# Patient Record
Sex: FEMALE | Race: WHITE | NOT HISPANIC OR LATINO | Employment: OTHER | URBAN - METROPOLITAN AREA
[De-identification: names, ages, dates, MRNs, and addresses within clinical notes are randomized per-mention and may not be internally consistent; named-entity substitution may affect disease eponyms.]

---

## 2017-01-03 DIAGNOSIS — R11.0 NAUSEA: ICD-10-CM

## 2017-01-04 RX ORDER — FOLIC ACID 1 MG/1
TABLET ORAL
Qty: 30 TABLET | Refills: 1 | Status: SHIPPED | OUTPATIENT
Start: 2017-01-04 | End: 2017-01-09 | Stop reason: SDUPTHER

## 2017-01-04 RX ORDER — METOCLOPRAMIDE 5 MG/1
TABLET ORAL
Qty: 60 TABLET | Refills: 1 | Status: SHIPPED | OUTPATIENT
Start: 2017-01-04 | End: 2017-01-09 | Stop reason: SDUPTHER

## 2017-01-09 DIAGNOSIS — R11.0 NAUSEA: ICD-10-CM

## 2017-01-09 RX ORDER — METOCLOPRAMIDE 5 MG/1
TABLET ORAL
Qty: 60 TABLET | Refills: 0 | Status: SHIPPED | OUTPATIENT
Start: 2017-01-09 | End: 2017-04-16 | Stop reason: SDUPTHER

## 2017-01-09 RX ORDER — FOLIC ACID 1 MG/1
TABLET ORAL
Qty: 30 TABLET | Refills: 0 | Status: SHIPPED | OUTPATIENT
Start: 2017-01-09 | End: 2017-03-29 | Stop reason: SDUPTHER

## 2017-01-10 ENCOUNTER — TELEPHONE (OUTPATIENT)
Dept: GASTROENTEROLOGY | Facility: CLINIC | Age: 57
End: 2017-01-10

## 2017-01-10 DIAGNOSIS — G40.919 INTRACTABLE EPILEPSY WITHOUT STATUS EPILEPTICUS (HCC): ICD-10-CM

## 2017-01-10 RX ORDER — LEVETIRACETAM 500 MG/1
TABLET ORAL
Qty: 60 TABLET | Refills: 0 | Status: SHIPPED | OUTPATIENT
Start: 2017-01-10 | End: 2017-02-11 | Stop reason: SDUPTHER

## 2017-01-10 NOTE — TELEPHONE ENCOUNTER
"Received fax [facility], stating pt needs prior auth on Xifaxan.  Submitted request via fax to [facility], but received letter stating \"unable to find pt in their records/or pt is ineligible\"  Faxed medicare and [employer] insurance cards back to [facility], but received same response later.    Called ptPORFIRIO to call our office.  Pt needs to call member services  "

## 2017-01-18 ENCOUNTER — TELEPHONE (OUTPATIENT)
Dept: GASTROENTEROLOGY | Facility: CLINIC | Age: 57
End: 2017-01-18

## 2017-01-18 NOTE — TELEPHONE ENCOUNTER
Pt had EGD 12/22/16 and next EGD due 6 months, June 2017.  She is also due for 6 month f/u of liver US in April 2017.  Pt would like to know when she needs an office appt?

## 2017-01-18 NOTE — TELEPHONE ENCOUNTER
Happy to see her at any time. In the past I've let Dr. Pitts monitor the labs while I tend to the procedures, but I haven't seen her in the office for quite some time.

## 2017-01-18 NOTE — TELEPHONE ENCOUNTER
Called [facility] to see if pt had new insurance, since [facility] states they are showing pt is ineligible.  Spoke with Laura, she faxed new insurance info on pt (Primary:  [employer] insurance, Secondary:  Medicare D [employer].    Called Luis insurance plan, no prior auth is needed on Xifaxan, the pharmacy just needs to call for override.    Called Laura back, she called plan, no further action needed, [employer]/Or insurance covered entire cost of Xifaxan.

## 2017-01-19 ENCOUNTER — APPOINTMENT (OUTPATIENT)
Dept: WOMENS IMAGING | Facility: HOSPITAL | Age: 57
End: 2017-01-19

## 2017-01-19 PROCEDURE — G0202 SCR MAMMO BI INCL CAD: HCPCS | Performed by: RADIOLOGY

## 2017-01-19 PROCEDURE — 77063 BREAST TOMOSYNTHESIS BI: CPT | Performed by: RADIOLOGY

## 2017-01-19 PROCEDURE — MDREVIEWSP: Performed by: RADIOLOGY

## 2017-01-24 ENCOUNTER — TELEPHONE (OUTPATIENT)
Dept: GASTROENTEROLOGY | Facility: CLINIC | Age: 57
End: 2017-01-24

## 2017-01-24 ENCOUNTER — OFFICE VISIT (OUTPATIENT)
Dept: INTERNAL MEDICINE | Facility: CLINIC | Age: 57
End: 2017-01-24

## 2017-01-24 VITALS
OXYGEN SATURATION: 97 % | TEMPERATURE: 98.3 F | SYSTOLIC BLOOD PRESSURE: 110 MMHG | BODY MASS INDEX: 29.03 KG/M2 | HEART RATE: 110 BPM | HEIGHT: 67 IN | DIASTOLIC BLOOD PRESSURE: 72 MMHG | WEIGHT: 185 LBS

## 2017-01-24 DIAGNOSIS — K76.82 HEPATIC ENCEPHALOPATHY (HCC): ICD-10-CM

## 2017-01-24 DIAGNOSIS — M25.561 CHRONIC PAIN OF BOTH KNEES: ICD-10-CM

## 2017-01-24 DIAGNOSIS — G89.29 CHRONIC PAIN OF BOTH KNEES: ICD-10-CM

## 2017-01-24 DIAGNOSIS — M25.562 CHRONIC PAIN OF BOTH KNEES: ICD-10-CM

## 2017-01-24 DIAGNOSIS — R11.2 NAUSEA AND VOMITING, INTRACTABILITY OF VOMITING NOT SPECIFIED, UNSPECIFIED VOMITING TYPE: Primary | ICD-10-CM

## 2017-01-24 PROCEDURE — 99213 OFFICE O/P EST LOW 20 MIN: CPT | Performed by: NURSE PRACTITIONER

## 2017-01-24 RX ORDER — DIPHENHYDRAMINE HCL 25 MG
25 CAPSULE ORAL EVERY 6 HOURS PRN
COMMUNITY
End: 2017-12-20 | Stop reason: HOSPADM

## 2017-01-24 NOTE — TELEPHONE ENCOUNTER
Pt started with nausea and vomiting on Saturday.  Saw PCP today, recommended she increase Reglan to TID and call our office for further management.  Pt states she does have gastroparesis.   No fever, not vomiting blood, no other symptoms.    Discussed with Dr Coleman, no other recommendations at this point.  LVM with pt.

## 2017-01-24 NOTE — PROGRESS NOTES
Subjective   Silvia Zabala is a 56 y.o. female.     HPI Comments: She has a history of liver disease. She recently had EGD on 12/22/16 by Dr Coleman which had esophageal varices with bands placed. Her blood sugars have been controlled.     Vomiting    This is a new problem. The current episode started yesterday. The problem occurs 2 to 4 times per day. The problem has been gradually improving. The emesis has an appearance of stomach contents. There has been no fever. Associated symptoms include abdominal pain (chronic ) and arthralgias (bilateral knees , chronic ). Pertinent negatives include no chest pain, chills, coughing, diarrhea or fever. She has tried increased fluids and diet change (peptobismol ) for the symptoms. The treatment provided mild relief.        The following portions of the patient's history were reviewed and updated as appropriate: allergies, current medications and problem list.    Review of Systems   Constitutional: Positive for appetite change. Negative for chills, fatigue and fever.   Respiratory: Negative for cough.    Cardiovascular: Negative for chest pain.   Gastrointestinal: Positive for abdominal distention, abdominal pain (chronic ), nausea and vomiting. Negative for blood in stool, constipation and diarrhea.   Genitourinary: Negative for dysuria and urgency.   Musculoskeletal: Positive for arthralgias (bilateral knees , chronic ). Negative for back pain.       Objective   Physical Exam   Constitutional: She is oriented to person, place, and time. She appears well-developed and well-nourished.   HENT:   Head: Normocephalic.   Nose: Nose normal.   Cardiovascular: Regular rhythm and normal heart sounds.  Exam reveals no S3 and no S4.    No murmur heard.  Pulmonary/Chest: Effort normal and breath sounds normal. She has no decreased breath sounds. She has no wheezes. She has no rhonchi. She has no rales.   Abdominal: Normal appearance. She exhibits distension and ascites. There is  generalized tenderness.   Musculoskeletal: She exhibits no edema.   Neurological: She is alert and oriented to person, place, and time. Gait normal.   Skin: Skin is warm and dry.   Psychiatric: She has a normal mood and affect.       Assessment/Plan   Silvia was seen today for vomiting.    Diagnoses and all orders for this visit:    Nausea and vomiting, intractability of vomiting not specified, unspecified vomiting type  Comments:  she will temporarirly increase reglan to 3 times daily; she will need to contact GI to discuss further management     Hepatic encephalopathy    Chronic pain of both knees  -     Ambulatory Referral to Orthopedic Surgery        Will have patient temporary increase Reglan to 3 times a day and inform GI and liver specialist regarding current symptoms.I discussed case with Dr Pitts. Patient request referral for chronic knee pain. I highly encourage she start PT (previously ordered).

## 2017-01-24 NOTE — MR AVS SNAPSHOT
Silvia Zabala   1/24/2017 8:15 AM   Office Visit    Dept Phone:  [phone]   Encounter #:  [id number]    Provider:  JOEL Maldonado   Department:  [facility] INTERNAL MEDICINE                Your Full Care Plan              Your Updated Medication List          This list is accurate as of: 1/24/17  9:18 AM.  Always use your most recent med list.                ALPRAZolam 0.5 MG tablet   Commonly known as:  XANAX   TAKE ONE TABLET BY MOUTH ONCE NIGHTLY AS NEEDED FOR ANXIETY       cyclobenzaprine 5 MG tablet   Commonly known as:  FLEXERIL   Take 1 tablet by mouth 2 (two) times a day as needed for muscle spasms.       cycloSPORINE 0.05 % ophthalmic emulsion   Commonly known as:  RESTASIS       diphenhydrAMINE 25 mg capsule   Commonly known as:  BENADRYL       docusate sodium 100 MG capsule   Commonly known as:  COLACE       DULoxetine 60 MG capsule   Commonly known as:  CYMBALTA   TAKE ONE CAPSULE BY MOUTH DAILY; **MUST CALL MD FOR APPOINTMENT       Etanercept 50 MG/ML solution auto-injector       ferrous sulfate 325 (65 FE) MG tablet   TAKE ONE TABLET BY MOUTH TWICE A DAY       folic acid 1 MG tablet   Commonly known as:  FOLVITE   TAKE ONE TABLET BY MOUTH DAILY       Insulin Glargine 100 UNIT/ML injection pen   Commonly known as:  LANTUS SOLOSTAR   INJECT 80 UNITS UNDER THE SKIN DAILY       Insulin Pen Needle 31G X 5 MM misc   To inject 5 -6 times per day       levETIRAcetam 500 MG tablet   Commonly known as:  KEPPRA   TAKE ONE TABLET BY MOUTH TWICE A DAY       LYRICA 75 MG capsule   Generic drug:  pregabalin   TAKE ONE CAPSULE BY MOUTH TWICE A DAY       MELATONIN PO       metFORMIN 1000 MG tablet   Commonly known as:  GLUCOPHAGE   Take 1 tablet by mouth 2 (Two) Times a Day With Meals.       metoclopramide 5 MG tablet   Commonly known as:  REGLAN   TAKE ONE TABLET BY MOUTH BEFORE MEALS AND AT BEDTIME; **MUST CALL MD FOR APPOINTMENT       MULTI-VITAMIN PO       NOVOLOG FLEXPEN 100 UNIT/ML solution pen-injector sc pen   Generic drug:  insulin aspart   INJECT 40 UNITS UNDER THE SKIN BEFORE EACH MEAL AND 25 units before SNACKS       pantoprazole 40 MG EC tablet   Commonly known as:  PROTONIX       pravastatin 40 MG tablet   Commonly known as:  PRAVACHOL       sucralfate 1 G tablet   Commonly known as:  CARAFATE       vitamin B-12 1000 MCG tablet   Commonly known as:  CYANOCOBALAMIN       XIFAXAN 550 MG tablet   Generic drug:  rifaximin   TAKE ONE TABLET BY MOUTH TWICE A DAY               We Performed the Following     Ambulatory Referral to Orthopedic Surgery       You Were Diagnosed With        Codes Comments    Nausea and vomiting, intractability of vomiting not specified, unspecified vomiting type    -  Primary ICD-10-CM: R11.2  ICD-9-CM: 787.01 she will temporarirly increase reglan to 3 times daily; she will need to contact GI to discuss further management     Hepatic encephalopathy     ICD-10-CM: K72.90  ICD-9-CM: 572.2     Chronic pain of both knees     ICD-10-CM: M25.561, M25.562, G89.29  ICD-9-CM: 719.46, 338.29       Instructions     None    Patient Instructions History      Upcoming Appointments     Visit Type Date Time Department    ACUTE           2017  8:15 AM [facility]    FOLLOW UP 2017 10:15 AM [facility]    [facility] 3/7/2017  9:00 AM [facility]    OFFICE VISIT 3/21/2017 11:00 AM [facility]    FOLLOW UP 1 UNIT 3/28/2017  1:40 PM [facility]    LAB 3/28/2017  1:00 PM [facility]      MyChart Signup     [facility] OpenEd allows you to send messages to your doctor, view your test results, renew your prescriptions, schedule appointments, and more. To sign up, go to [employer] and click on the Sign Up Now link in the New User? box. Enter your OpenEd Activation Code exactly as it appears below along with the last four digits of your Social Security Number and your Date of Birth () to  "complete the sign-up process. If you do not sign up before the expiration date, you must request a new code.    [facility] Activation Code: [email or web address]  Expires: 2/7/2017  9:17 AM    If you have questions, you can email [email or web address] or call [phone] to talk to our [facility] staff. Remember, [employer] is NOT to be used for urgent needs. For medical emergencies, dial 911.               Other Info from Your Visit           Your Appointments     Feb 02, 2017 10:15 AM EST   Follow Up with Blanca Pitts MD   [facility] INTERNAL MEDICINE (--)    [address]  [facility] [address] [phone]           Arrive 15 minutes prior to appointment.            Mar 07, 2017  9:00 AM EST   [facility] with [facility], [facility]   [facility] ENDOCRINOLOGY (--)    [address]  [facility] [address] [phone]            Mar 21, 2017 11:00 AM EDT   Office Visit with Merary Burnett MD   [facility] ENDOCRINOLOGY (--)    [address]  [facility] [address] [phone]           Arrive 15 minutes prior to appointment.            Mar 28, 2017  1:00 PM EDT   Lab with LAB CHAIR [facility] ONCOLOGY CBC LAB ([place])    [address]  [facility] [address] [phone]            Mar 28, 2017  1:40 PM EDT   FOLLOW UP with Sukhdeep Mcdowell MD   [facility] CBC GROUP: [facility] IN BLOOD DISORDERS AND CANCER (CBC [place])    [address]  [facility] [address] [phone]              Allergies     Albuterol  Anaphylaxis    Quinine Derivatives  Nausea And Vomiting    Tramadol        Reason for Visit     Vomiting           Vital Signs     Blood Pressure Pulse Temperature Height Weight Oxygen Saturation    110/72 (BP Location: Right arm) 110 98.3 °F (36.8 °C) (Oral) 66.5\" (168.9 cm) 185 lb (83.9 kg) 97%    Body Mass Index Smoking Status    "             29.41 kg/m2 Former Smoker          Problems and Diagnoses Noted     Liver encephalopathy    Nausea and vomiting    -  Primary    Chronic pain of both knees

## 2017-02-02 ENCOUNTER — OFFICE VISIT (OUTPATIENT)
Dept: INTERNAL MEDICINE | Facility: CLINIC | Age: 57
End: 2017-02-02

## 2017-02-02 VITALS
BODY MASS INDEX: 28.41 KG/M2 | RESPIRATION RATE: 14 BRPM | WEIGHT: 181 LBS | DIASTOLIC BLOOD PRESSURE: 68 MMHG | SYSTOLIC BLOOD PRESSURE: 120 MMHG | HEIGHT: 67 IN

## 2017-02-02 DIAGNOSIS — K31.84 GASTROPARESIS: ICD-10-CM

## 2017-02-02 DIAGNOSIS — F32.A ANXIETY AND DEPRESSION: Primary | ICD-10-CM

## 2017-02-02 DIAGNOSIS — E78.5 HYPERLIPIDEMIA, UNSPECIFIED HYPERLIPIDEMIA TYPE: ICD-10-CM

## 2017-02-02 DIAGNOSIS — F41.9 ANXIETY AND DEPRESSION: Primary | ICD-10-CM

## 2017-02-02 PROCEDURE — 99213 OFFICE O/P EST LOW 20 MIN: CPT | Performed by: INTERNAL MEDICINE

## 2017-02-02 RX ORDER — DULOXETIN HYDROCHLORIDE 30 MG/1
CAPSULE, DELAYED RELEASE ORAL
Qty: 90 CAPSULE | Refills: 5 | Status: SHIPPED | OUTPATIENT
Start: 2017-02-02 | End: 2017-11-01 | Stop reason: SDUPTHER

## 2017-02-02 RX ORDER — MELATONIN
1000 DAILY
COMMUNITY
End: 2018-05-23 | Stop reason: HOSPADM

## 2017-02-02 NOTE — PROGRESS NOTES
Chief Complaint   Patient presents with   • Diabetes     3 month follow up    • Hyperlipidemia        Subjective   Silvia Zabala is a 56 y.o. female she was seen recently here for nausea and vomiting.  Reglan was increased.    Diabetes   Pertinent negatives for diabetes include no chest pain, no fatigue, no polydipsia and no polyuria.   Hyperlipidemia   This is a chronic problem. The problem is controlled. Recent lipid tests were reviewed and are variable (Cholesterol levels were good, triglycerides high). Exacerbating diseases include diabetes, liver disease and obesity. Pertinent negatives include no chest pain, leg pain, myalgias or shortness of breath. Current antihyperlipidemic treatment includes statins. The current treatment provides significant improvement of lipids. There are no compliance problems.  Risk factors for coronary artery disease include diabetes mellitus, dyslipidemia and obesity.   : Her blood sugars have been better.  Fasting yesterday 105.  She is seeing endocrinologist now.     Higher dose of reglan seems to be helping nausea.   Saw Dr. Munoz recently - had a shoulder xray.  Interstitial lung disease suspected on small portion of lung seen on shoulder xray. She is scheduled to have lung ct.  She is going to see orthopedist soon and will discuss knee pain there. She is seeing PT for shoulder pain - possible rotator cuff probem suspected.  She has not done PT for her knees yet.     She is wondering about taking an antidepressant and seeing a therapist. She had a hard time over the holidays since her mother passed away last year.  Her daughter has bi-polar disorder, her father is raising her daughter's two sons.  They both have ADHD. She is still taking duloxetine but had to stop abilify due to potential interaction with Reglan.    The following portions of the patient's history were reviewed and updated as appropriate: allergies, current medications, past social history, problem list,  "past surgical history    Review of Systems   Constitutional: Negative for fatigue.   Eyes: Negative for visual disturbance.   Respiratory: Negative for cough and shortness of breath.    Cardiovascular: Negative for chest pain, palpitations and leg swelling.   Gastrointestinal: Negative for abdominal pain.   Endocrine: Negative for polydipsia and polyuria.   Musculoskeletal: Negative for myalgias.       Visit Vitals   • /68 (BP Location: Left arm, Patient Position: Sitting, Cuff Size: Adult)   • Resp 14   • Ht 66.5\" (168.9 cm)   • Wt 181 lb (82.1 kg)   • BMI 28.78 kg/m2        Objective   Physical Exam   Constitutional: She is oriented to person, place, and time. She appears well-developed and well-nourished. No distress.   Neck: Normal carotid pulses present. Carotid bruit is not present.   Cardiovascular: Normal rate, regular rhythm, S1 normal, S2 normal and intact distal pulses.  Exam reveals no gallop and no friction rub.    No murmur heard.  Pulses:       Carotid pulses are 2+ on the right side, and 2+ on the left side.  Pulmonary/Chest: Effort normal and breath sounds normal. No respiratory distress. She has no wheezes. She has no rales. Chest wall is not dull to percussion.   Musculoskeletal: She exhibits no edema.   Neurological: She is alert and oriented to person, place, and time.   Skin: Skin is warm and dry.   Nursing note and vitals reviewed.      Assessment/Plan   Problem List Items Addressed This Visit        Cardiovascular and Mediastinum    Hyperlipidemia       Other    Anxiety and depression - Primary    Relevant Medications    DULoxetine (CYMBALTA) 30 MG capsule    Other Relevant Orders    Ambulatory Referral to Psychiatry (Completed)      Other Visit Diagnoses     Gastroparesis           diabetes: She will continue to see her endocrinologist for this.  Nausea and vomiting, history of gastroparesis: She will continue the higher dose of Reglan.  For anxiety and depression, Cymbalta was " increased from 60 mg to 90 mg daily.  Recommended [facility] and consultation with psychiatry.  She will continue pravastatin for hyperlipidemia.  She recently had a cholesterol panel done and will not repeat today.

## 2017-02-03 NOTE — TELEPHONE ENCOUNTER
----- Message from Brittney Arellano sent at 2/3/2017 12:00 PM EST -----  Contact: patient  Patient says that she needs a refill of test strips  Test strips one touch ultra 2, tests 3-4 times per day, 30 day supply  Send to [facility]  [phone] (Phone)  [phone] (Fax)      Test strips sent to pharmacy

## 2017-02-11 DIAGNOSIS — G40.919 INTRACTABLE EPILEPSY WITHOUT STATUS EPILEPTICUS (HCC): ICD-10-CM

## 2017-02-13 ENCOUNTER — TELEPHONE (OUTPATIENT)
Dept: INTERNAL MEDICINE | Facility: CLINIC | Age: 57
End: 2017-02-13

## 2017-02-13 RX ORDER — PRAVASTATIN SODIUM 40 MG
40 TABLET ORAL DAILY
Qty: 30 TABLET | Refills: 5 | Status: SHIPPED | OUTPATIENT
Start: 2017-02-13 | End: 2017-03-30 | Stop reason: SDUPTHER

## 2017-02-13 RX ORDER — LEVETIRACETAM 500 MG/1
TABLET ORAL
Qty: 60 TABLET | Refills: 2 | Status: SHIPPED | OUTPATIENT
Start: 2017-02-13 | End: 2017-05-11 | Stop reason: SDUPTHER

## 2017-02-14 ENCOUNTER — OFFICE VISIT (OUTPATIENT)
Dept: GASTROENTEROLOGY | Facility: CLINIC | Age: 57
End: 2017-02-14

## 2017-02-14 VITALS
SYSTOLIC BLOOD PRESSURE: 145 MMHG | HEART RATE: 118 BPM | WEIGHT: 181 LBS | BODY MASS INDEX: 28.41 KG/M2 | HEIGHT: 67 IN | DIASTOLIC BLOOD PRESSURE: 92 MMHG | TEMPERATURE: 98.9 F

## 2017-02-14 DIAGNOSIS — R10.84 GENERALIZED ABDOMINAL PAIN: ICD-10-CM

## 2017-02-14 DIAGNOSIS — K74.60 CIRRHOSIS OF LIVER WITHOUT ASCITES, UNSPECIFIED HEPATIC CIRRHOSIS TYPE (HCC): Primary | ICD-10-CM

## 2017-02-14 DIAGNOSIS — K76.82 HEPATIC ENCEPHALOPATHY (HCC): ICD-10-CM

## 2017-02-14 PROCEDURE — 99214 OFFICE O/P EST MOD 30 MIN: CPT | Performed by: INTERNAL MEDICINE

## 2017-02-14 NOTE — PROGRESS NOTES
Subjective   Silvia Zabala is a 56 y.o. female is here today for follow-up.  Chief Complaint   Patient presents with   • Cirrhosis   • Nausea   • Vomiting     History of Present Illness  Over the past 2 weeks patient has not felt well.  It is difficult for her to express exactly why, other than she has felt nauseated and that she has a feeling of increasing abdominal girth and some abdominal discomfort.  She has had some nausea but no hematin temp assess or melena.  She has had no fever.  Her appetite is poor.  She is under great deal of stress, having significant family issues down in [place].  She is extremely anxious.  The following portions of the patient's history were reviewed and updated as appropriate: allergies, current medications, past family history, past medical history, past social history, past surgical history and problem list.    Review of Systems   Constitutional: Positive for fatigue.   Respiratory: Negative for chest tightness.    Cardiovascular: Negative for chest pain.   Gastrointestinal: Positive for abdominal distention and nausea. Negative for anal bleeding and blood in stool.   Psychiatric/Behavioral: The patient is nervous/anxious.        Objective   Physical Exam   Constitutional: Vital signs are normal. She appears well-developed and well-nourished.   Cardiovascular: S1 normal and S2 normal.  Exam reveals no gallop and no S3.    Pulmonary/Chest: Effort normal and breath sounds normal. No respiratory distress.   Abdominal: Normal appearance. She exhibits no distension and no ascites. There is no tenderness.         Assessment/Plan   Problems Addressed this Visit        Digestive    Cirrhosis of liver - Primary    Relevant Orders    US Abdomen Complete    CBC & Differential    Comprehensive Metabolic Panel    Protime-INR       Nervous and Auditory    Hepatic encephalopathy    Relevant Orders    US Abdomen Complete    CBC & Differential    Comprehensive Metabolic Panel    Protime-INR     "Generalized abdominal pain        We will get the above labs and see where that leads us.  It possibly at this point hard to know whether there is something \"in the works\" whether she is under some pressure and anxiety that that is causing her symptoms.           "

## 2017-02-15 ENCOUNTER — ANESTHESIA (OUTPATIENT)
Dept: GASTROENTEROLOGY | Facility: HOSPITAL | Age: 57
End: 2017-02-15

## 2017-02-15 ENCOUNTER — TELEPHONE (OUTPATIENT)
Dept: GASTROENTEROLOGY | Facility: CLINIC | Age: 57
End: 2017-02-15

## 2017-02-15 ENCOUNTER — ANESTHESIA EVENT (OUTPATIENT)
Dept: GASTROENTEROLOGY | Facility: HOSPITAL | Age: 57
End: 2017-02-15

## 2017-02-15 ENCOUNTER — HOSPITAL ENCOUNTER (INPATIENT)
Facility: HOSPITAL | Age: 57
LOS: 4 days | Discharge: HOME | End: 2017-02-19
Attending: EMERGENCY MEDICINE | Admitting: INTERNAL MEDICINE

## 2017-02-15 DIAGNOSIS — I85.11 SECONDARY ESOPHAGEAL VARICES WITH BLEEDING (HCC): ICD-10-CM

## 2017-02-15 DIAGNOSIS — K92.2 UGI BLEED: Primary | ICD-10-CM

## 2017-02-15 LAB
ABO GROUP BLD: NORMAL
ALBUMIN SERPL-MCNC: 3.6 G/DL (ref 3.5–5.2)
ALBUMIN/GLOB SERPL: 1.2 G/DL
ALP SERPL-CCNC: 83 U/L (ref 39–117)
ALT SERPL W P-5'-P-CCNC: 36 U/L (ref 1–33)
ANION GAP SERPL CALCULATED.3IONS-SCNC: 17.4 MMOL/L
AST SERPL-CCNC: 29 U/L (ref 1–32)
BASOPHILS # BLD AUTO: 0.04 10*3/MM3 (ref 0–0.2)
BASOPHILS NFR BLD AUTO: 0.3 % (ref 0–1.5)
BILIRUB SERPL-MCNC: 1 MG/DL (ref 0.1–1.2)
BLD GP AB SCN SERPL QL: NEGATIVE
BUN BLD-MCNC: 36 MG/DL (ref 6–20)
BUN/CREAT SERPL: 54.5 (ref 7–25)
CALCIUM SPEC-SCNC: 9.1 MG/DL (ref 8.6–10.5)
CHLORIDE SERPL-SCNC: 96 MMOL/L (ref 98–107)
CO2 SERPL-SCNC: 21.6 MMOL/L (ref 22–29)
CREAT BLD-MCNC: 0.66 MG/DL (ref 0.57–1)
DEPRECATED RDW RBC AUTO: 47.5 FL (ref 37–54)
EOSINOPHIL # BLD AUTO: 0.11 10*3/MM3 (ref 0–0.7)
EOSINOPHIL NFR BLD AUTO: 0.8 % (ref 0.3–6.2)
ERYTHROCYTE [DISTWIDTH] IN BLOOD BY AUTOMATED COUNT: 15.1 % (ref 11.7–13)
GFR SERPL CREATININE-BSD FRML MDRD: 93 ML/MIN/1.73
GLOBULIN UR ELPH-MCNC: 3 GM/DL
GLUCOSE BLD-MCNC: 307 MG/DL (ref 65–99)
GLUCOSE BLDC GLUCOMTR-MCNC: 136 MG/DL (ref 70–130)
GLUCOSE BLDC GLUCOMTR-MCNC: 275 MG/DL (ref 70–130)
GLUCOSE BLDC GLUCOMTR-MCNC: 292 MG/DL (ref 70–130)
GLUCOSE BLDC GLUCOMTR-MCNC: 304 MG/DL (ref 70–130)
GLUCOSE BLDC GLUCOMTR-MCNC: 312 MG/DL (ref 70–130)
GLUCOSE BLDC GLUCOMTR-MCNC: 329 MG/DL (ref 70–130)
HCT VFR BLD AUTO: 31.7 % (ref 35.6–45.5)
HCT VFR BLD AUTO: 32.8 % (ref 35.6–45.5)
HCT VFR BLD AUTO: 33.7 % (ref 35.6–45.5)
HGB BLD-MCNC: 10.7 G/DL (ref 11.9–15.5)
HGB BLD-MCNC: 10.7 G/DL (ref 11.9–15.5)
HGB BLD-MCNC: 11.3 G/DL (ref 11.9–15.5)
IMM GRANULOCYTES # BLD: 0.08 10*3/MM3 (ref 0–0.03)
IMM GRANULOCYTES NFR BLD: 0.6 % (ref 0–0.5)
INR PPP: 1.21 (ref 0.9–1.1)
LYMPHOCYTES # BLD AUTO: 2.34 10*3/MM3 (ref 0.9–4.8)
LYMPHOCYTES NFR BLD AUTO: 17 % (ref 19.6–45.3)
MCH RBC QN AUTO: 28.2 PG (ref 26.9–32)
MCHC RBC AUTO-ENTMCNC: 32.6 G/DL (ref 32.4–36.3)
MCV RBC AUTO: 86.5 FL (ref 80.5–98.2)
MONOCYTES # BLD AUTO: 1.04 10*3/MM3 (ref 0.2–1.2)
MONOCYTES NFR BLD AUTO: 7.5 % (ref 5–12)
NEUTROPHILS # BLD AUTO: 10.18 10*3/MM3 (ref 1.9–8.1)
NEUTROPHILS NFR BLD AUTO: 73.8 % (ref 42.7–76)
PLATELET # BLD AUTO: 199 10*3/MM3 (ref 140–500)
PMV BLD AUTO: 10.8 FL (ref 6–12)
POTASSIUM BLD-SCNC: 4.4 MMOL/L (ref 3.5–5.2)
POTASSIUM BLD-SCNC: 5.3 MMOL/L (ref 3.5–5.2)
PROT SERPL-MCNC: 6.6 G/DL (ref 6–8.5)
PROTHROMBIN TIME: 14.9 SECONDS (ref 11.7–14.2)
RBC # BLD AUTO: 3.79 10*6/MM3 (ref 3.9–5.2)
RH BLD: POSITIVE
SODIUM BLD-SCNC: 135 MMOL/L (ref 136–145)
WBC NRBC COR # BLD: 13.79 10*3/MM3 (ref 4.5–10.7)

## 2017-02-15 PROCEDURE — 25010000003 CEFTRIAXONE PER 250 MG: Performed by: INTERNAL MEDICINE

## 2017-02-15 PROCEDURE — 63710000001 INSULIN ASPART PER 5 UNITS: Performed by: INTERNAL MEDICINE

## 2017-02-15 PROCEDURE — 80053 COMPREHEN METABOLIC PANEL: CPT | Performed by: EMERGENCY MEDICINE

## 2017-02-15 PROCEDURE — 86901 BLOOD TYPING SEROLOGIC RH(D): CPT

## 2017-02-15 PROCEDURE — 30233N1 TRANSFUSION OF NONAUTOLOGOUS RED BLOOD CELLS INTO PERIPHERAL VEIN, PERCUTANEOUS APPROACH: ICD-10-PCS | Performed by: INTERNAL MEDICINE

## 2017-02-15 PROCEDURE — 85025 COMPLETE CBC W/AUTO DIFF WBC: CPT | Performed by: EMERGENCY MEDICINE

## 2017-02-15 PROCEDURE — 25010000002 PROPOFOL 10 MG/ML EMULSION: Performed by: ANESTHESIOLOGY

## 2017-02-15 PROCEDURE — 86850 RBC ANTIBODY SCREEN: CPT

## 2017-02-15 PROCEDURE — 25010000002 OCTREOTIDE PER 25 MCG: Performed by: EMERGENCY MEDICINE

## 2017-02-15 PROCEDURE — P9016 RBC LEUKOCYTES REDUCED: HCPCS

## 2017-02-15 PROCEDURE — 25010000002 ONDANSETRON PER 1 MG: Performed by: EMERGENCY MEDICINE

## 2017-02-15 PROCEDURE — 86900 BLOOD TYPING SEROLOGIC ABO: CPT

## 2017-02-15 PROCEDURE — 36430 TRANSFUSION BLD/BLD COMPNT: CPT

## 2017-02-15 PROCEDURE — 86920 COMPATIBILITY TEST SPIN: CPT

## 2017-02-15 PROCEDURE — 25010000002 ONDANSETRON PER 1 MG

## 2017-02-15 PROCEDURE — 85018 HEMOGLOBIN: CPT | Performed by: INTERNAL MEDICINE

## 2017-02-15 PROCEDURE — 85610 PROTHROMBIN TIME: CPT | Performed by: EMERGENCY MEDICINE

## 2017-02-15 PROCEDURE — 43244 EGD VARICES LIGATION: CPT | Performed by: INTERNAL MEDICINE

## 2017-02-15 PROCEDURE — 94799 UNLISTED PULMONARY SVC/PX: CPT

## 2017-02-15 PROCEDURE — 82962 GLUCOSE BLOOD TEST: CPT

## 2017-02-15 PROCEDURE — 99284 EMERGENCY DEPT VISIT MOD MDM: CPT

## 2017-02-15 PROCEDURE — 25010000002 OCTREOTIDE PER 25 MCG: Performed by: INTERNAL MEDICINE

## 2017-02-15 PROCEDURE — 06L34CZ OCCLUSION OF ESOPHAGEAL VEIN WITH EXTRALUMINAL DEVICE, PERCUTANEOUS ENDOSCOPIC APPROACH: ICD-10-PCS | Performed by: INTERNAL MEDICINE

## 2017-02-15 PROCEDURE — 25010000002 MORPHINE SULFATE (PF) 2 MG/ML SOLUTION: Performed by: INTERNAL MEDICINE

## 2017-02-15 PROCEDURE — 84132 ASSAY OF SERUM POTASSIUM: CPT | Performed by: INTERNAL MEDICINE

## 2017-02-15 PROCEDURE — 85014 HEMATOCRIT: CPT | Performed by: INTERNAL MEDICINE

## 2017-02-15 RX ORDER — ONDANSETRON 2 MG/ML
4 INJECTION INTRAMUSCULAR; INTRAVENOUS ONCE
Status: COMPLETED | OUTPATIENT
Start: 2017-02-15 | End: 2017-02-15

## 2017-02-15 RX ORDER — ONDANSETRON 2 MG/ML
INJECTION INTRAMUSCULAR; INTRAVENOUS
Status: COMPLETED
Start: 2017-02-15 | End: 2017-02-15

## 2017-02-15 RX ORDER — OCTREOTIDE ACETATE 50 UG/ML
50 INJECTION, SOLUTION INTRAVENOUS; SUBCUTANEOUS ONCE
Status: COMPLETED | OUTPATIENT
Start: 2017-02-15 | End: 2017-02-15

## 2017-02-15 RX ORDER — DEXTROSE MONOHYDRATE 25 G/50ML
25 INJECTION, SOLUTION INTRAVENOUS
Status: DISCONTINUED | OUTPATIENT
Start: 2017-02-15 | End: 2017-02-19 | Stop reason: HOSPADM

## 2017-02-15 RX ORDER — SODIUM CHLORIDE, SODIUM LACTATE, POTASSIUM CHLORIDE, CALCIUM CHLORIDE 600; 310; 30; 20 MG/100ML; MG/100ML; MG/100ML; MG/100ML
INJECTION, SOLUTION INTRAVENOUS CONTINUOUS PRN
Status: DISCONTINUED | OUTPATIENT
Start: 2017-02-15 | End: 2017-02-15 | Stop reason: SURG

## 2017-02-15 RX ORDER — SODIUM CHLORIDE 0.9 % (FLUSH) 0.9 %
10 SYRINGE (ML) INJECTION AS NEEDED
Status: DISCONTINUED | OUTPATIENT
Start: 2017-02-15 | End: 2017-02-19 | Stop reason: HOSPADM

## 2017-02-15 RX ORDER — NICOTINE POLACRILEX 4 MG
15 LOZENGE BUCCAL
Status: DISCONTINUED | OUTPATIENT
Start: 2017-02-15 | End: 2017-02-19 | Stop reason: HOSPADM

## 2017-02-15 RX ORDER — PANTOPRAZOLE SODIUM 40 MG/10ML
80 INJECTION, POWDER, LYOPHILIZED, FOR SOLUTION INTRAVENOUS ONCE
Status: COMPLETED | OUTPATIENT
Start: 2017-02-15 | End: 2017-02-15

## 2017-02-15 RX ORDER — SODIUM CHLORIDE 0.9 % (FLUSH) 0.9 %
1-10 SYRINGE (ML) INJECTION AS NEEDED
Status: DISCONTINUED | OUTPATIENT
Start: 2017-02-15 | End: 2017-02-16

## 2017-02-15 RX ORDER — SODIUM CHLORIDE 9 MG/ML
125 INJECTION, SOLUTION INTRAVENOUS CONTINUOUS
Status: DISCONTINUED | OUTPATIENT
Start: 2017-02-15 | End: 2017-02-18

## 2017-02-15 RX ORDER — MORPHINE SULFATE 2 MG/ML
2 INJECTION, SOLUTION INTRAMUSCULAR; INTRAVENOUS EVERY 4 HOURS PRN
Status: DISCONTINUED | OUTPATIENT
Start: 2017-02-15 | End: 2017-02-19 | Stop reason: HOSPADM

## 2017-02-15 RX ORDER — LIDOCAINE HYDROCHLORIDE 20 MG/ML
INJECTION, SOLUTION INFILTRATION; PERINEURAL AS NEEDED
Status: DISCONTINUED | OUTPATIENT
Start: 2017-02-15 | End: 2017-02-15 | Stop reason: SURG

## 2017-02-15 RX ORDER — CEFTRIAXONE SODIUM 2 G/50ML
2 INJECTION, SOLUTION INTRAVENOUS EVERY 24 HOURS
Status: DISCONTINUED | OUTPATIENT
Start: 2017-02-15 | End: 2017-02-16

## 2017-02-15 RX ORDER — PROPOFOL 10 MG/ML
VIAL (ML) INTRAVENOUS AS NEEDED
Status: DISCONTINUED | OUTPATIENT
Start: 2017-02-15 | End: 2017-02-15 | Stop reason: SURG

## 2017-02-15 RX ORDER — SODIUM CHLORIDE, SODIUM LACTATE, POTASSIUM CHLORIDE, CALCIUM CHLORIDE 600; 310; 30; 20 MG/100ML; MG/100ML; MG/100ML; MG/100ML
30 INJECTION, SOLUTION INTRAVENOUS CONTINUOUS
Status: DISCONTINUED | OUTPATIENT
Start: 2017-02-15 | End: 2017-02-15

## 2017-02-15 RX ADMIN — ONDANSETRON 4 MG: 2 INJECTION INTRAMUSCULAR; INTRAVENOUS at 05:14

## 2017-02-15 RX ADMIN — PROPOFOL 150 MG: 10 INJECTION, EMULSION INTRAVENOUS at 14:03

## 2017-02-15 RX ADMIN — SODIUM CHLORIDE 125 ML/HR: 9 INJECTION, SOLUTION INTRAVENOUS at 13:33

## 2017-02-15 RX ADMIN — PANTOPRAZOLE SODIUM 8 MG/HR: 40 INJECTION, POWDER, FOR SOLUTION INTRAVENOUS at 15:20

## 2017-02-15 RX ADMIN — ONDANSETRON 4 MG: 2 INJECTION INTRAMUSCULAR; INTRAVENOUS at 03:32

## 2017-02-15 RX ADMIN — PANTOPRAZOLE SODIUM 8 MG/HR: 40 INJECTION, POWDER, FOR SOLUTION INTRAVENOUS at 20:45

## 2017-02-15 RX ADMIN — OCTREOTIDE ACETATE 25 MCG/HR: 500 INJECTION, SOLUTION INTRAVENOUS; SUBCUTANEOUS at 09:07

## 2017-02-15 RX ADMIN — MORPHINE SULFATE 2 MG: 2 INJECTION, SOLUTION INTRAMUSCULAR; INTRAVENOUS at 19:50

## 2017-02-15 RX ADMIN — LIDOCAINE HYDROCHLORIDE 75 MG: 20 INJECTION, SOLUTION INFILTRATION; PERINEURAL at 14:03

## 2017-02-15 RX ADMIN — INSULIN ASPART 14 UNITS: 100 INJECTION, SOLUTION INTRAVENOUS; SUBCUTANEOUS at 13:23

## 2017-02-15 RX ADMIN — OCTREOTIDE ACETATE 50 MCG: 50 INJECTION, SOLUTION INTRAVENOUS; SUBCUTANEOUS at 05:06

## 2017-02-15 RX ADMIN — SODIUM CHLORIDE 1000 ML: 9 INJECTION, SOLUTION INTRAVENOUS at 03:00

## 2017-02-15 RX ADMIN — SODIUM CHLORIDE 125 ML/HR: 9 INJECTION, SOLUTION INTRAVENOUS at 05:16

## 2017-02-15 RX ADMIN — PANTOPRAZOLE SODIUM 8 MG/HR: 40 INJECTION, POWDER, FOR SOLUTION INTRAVENOUS at 11:45

## 2017-02-15 RX ADMIN — SODIUM CHLORIDE 2517 ML: 9 INJECTION, SOLUTION INTRAVENOUS at 03:47

## 2017-02-15 RX ADMIN — CEFTRIAXONE SODIUM 2 G: 2 INJECTION, SOLUTION INTRAVENOUS at 15:18

## 2017-02-15 RX ADMIN — PANTOPRAZOLE SODIUM 80 MG: 40 INJECTION, POWDER, FOR SOLUTION INTRAVENOUS at 03:30

## 2017-02-15 RX ADMIN — SODIUM CHLORIDE, POTASSIUM CHLORIDE, SODIUM LACTATE AND CALCIUM CHLORIDE: 600; 310; 30; 20 INJECTION, SOLUTION INTRAVENOUS at 14:00

## 2017-02-15 RX ADMIN — OCTREOTIDE ACETATE 25 MCG/HR: 500 INJECTION, SOLUTION INTRAVENOUS; SUBCUTANEOUS at 20:45

## 2017-02-15 RX ADMIN — INSULIN ASPART 15 UNITS: 100 INJECTION, SOLUTION INTRAVENOUS; SUBCUTANEOUS at 09:03

## 2017-02-15 RX ADMIN — PROPOFOL 50 MG: 10 INJECTION, EMULSION INTRAVENOUS at 14:10

## 2017-02-15 RX ADMIN — PROPOFOL 50 MG: 10 INJECTION, EMULSION INTRAVENOUS at 14:05

## 2017-02-15 NOTE — ED NOTES
Pt had large dark red vomiting episode on the floor. Order obtained from Dr. Mccormick for zofran 4 mg iv.     Marifer Hudson RN  02/15/17 0125

## 2017-02-15 NOTE — ANESTHESIA POSTPROCEDURE EVALUATION
Patient: Silvia Zabala    Procedure Summary     Date Anesthesia Start Anesthesia Stop Room / Location    02/15/17 1400 1426  [facility] /  [facility]       Procedure Diagnosis Surgeon Provider    ESOPHAGOGASTRODUODENOSCOPY AT BEDSIDE with esophageal varices banding (3) (N/A Esophagus) No diagnosis on file. MD Maria Gudaalupe Mon MD          Anesthesia Type: MAC  Last vitals  /69 (02/15/17 1419)    Temp      Pulse 112 (02/15/17 1419)   Resp 21 (02/15/17 1419)    SpO2 98 % (02/15/17 1419)      Post Anesthesia Care and Evaluation    Patient location during evaluation: ICU

## 2017-02-15 NOTE — PROGRESS NOTES
Brief procedure note:    Procedure: EGD    Pre-op diagnosis: GI bleed    Post-op diagnosis: Esophageal varices, banded. Duodenitis.     Recommendations: Octreotide/Protonix.     Please refer to the Provation-generated note for photos and further details.     Rich Coleman MD

## 2017-02-15 NOTE — H&P
[facility]  Phone: [phone]  Andrea Hall MD      Subjective   LOS: 0 days     56-year-old female with acute upper GI bleeding.  She has a history of an DUKES and esophageal varices.  She had esophageal varices banded in December 2016.  She recently also had an esophageal ulcer that was cauterized.  She was evaluated by the liver transplant team and is considered to be not a transplant candidate as of yet.  Patient states that she always has some abdominal pain.  She has been somewhat nauseous in about 3 weeks ago had an episode of emesis.  However this was not bloody.  Today she had the bright red blood emesis.  On the ER she had very large bloody emesis.  She has been dizzy and lightheaded.  However hemoglobin on entry was okay.    Patient has a significant history of rheumatoid arthritis and lupus.  She is on biologic agents for same.  More recently she is suspected of having interstitial lung disease related to her rheumatological issues.  She does not use oxygen at home and does not have any exercise limitation due to shortness of breath as of yet.  She does have diabetes mellitus.  She denies any heart disease or prior history of cancer.    Patient reports daytime sleepiness and snoring at night.  She has never been evaluated for sleep apnea.     I have reviewed and edited the Past Medical History, Past Surgical History, Home Medications, Social History and Family History as of 7:15 AM on 02/15/17.    Prescriptions Prior to Admission   Medication Sig Dispense Refill Last Dose   • ALPRAZolam (XANAX) 0.5 MG tablet TAKE ONE TABLET BY MOUTH ONCE NIGHTLY AS NEEDED FOR ANXIETY 60 tablet 2 Taking   • cholecalciferol (VITAMIN D3) 1000 UNITS tablet Take 1,000 Units by mouth Daily.   Taking   • cyclobenzaprine (FLEXERIL) 5 MG tablet Take 1 tablet by mouth 2 (two) times a day as needed for muscle spasms. 30 tablet 2 Not Taking   • cycloSPORINE (RESTASIS) 0.05 % ophthalmic emulsion 1 drop 2 (two) times a  day.   Not Taking   • diphenhydrAMINE (BENADRYL) 25 mg capsule Take 25 mg by mouth Every 6 (Six) Hours As Needed.   Taking   • docusate sodium (COLACE) 100 MG capsule Take 100 mg by mouth as needed for constipation.   Taking   • DULoxetine (CYMBALTA) 30 MG capsule Take 3 capsules daily 90 capsule 5 Taking   • Etanercept 50 MG/ML solution auto-injector Inject under the skin.   Taking   • ferrous sulfate 325 (65 FE) MG tablet TAKE ONE TABLET BY MOUTH TWICE A DAY 60 tablet 6 Taking   • folic acid (FOLVITE) 1 MG tablet TAKE ONE TABLET BY MOUTH DAILY 30 tablet 0 Taking   • glucose blood (ONE TOUCH ULTRA TEST) test strip Test blood sugar 4 times daily 200 each 5 Taking   • Insulin Glargine (LANTUS SOLOSTAR) 100 UNIT/ML injection pen INJECT 80 UNITS UNDER THE SKIN DAILY 3 pen 3 Taking   • Insulin Pen Needle 31G X 5 MM misc To inject 5 -6 times per day 100 each 11 Taking   • levETIRAcetam (KEPPRA) 500 MG tablet TAKE ONE TABLET BY MOUTH TWICE A DAY 60 tablet 2 Taking   • LYRICA 75 MG capsule TAKE ONE CAPSULE BY MOUTH TWICE A DAY 60 capsule 1 Taking   • MELATONIN PO Take 20 mg by mouth Daily.   Taking   • metFORMIN (GLUCOPHAGE) 1000 MG tablet Take 1 tablet by mouth 2 (Two) Times a Day With Meals. (Patient taking differently: Take 1,000 mg by mouth 3 (Three) Times a Day.) 60 tablet 11 Taking   • metoclopramide (REGLAN) 5 MG tablet TAKE ONE TABLET BY MOUTH BEFORE MEALS AND AT BEDTIME; **MUST CALL MD FOR APPOINTMENT 60 tablet 0 Taking   • Multiple Vitamin (MULTI-VITAMIN PO) Take  by mouth.   Taking   • NOVOLOG FLEXPEN 100 UNIT/ML solution pen-injector sc pen INJECT 40 UNITS UNDER THE SKIN BEFORE EACH MEAL AND 25 units before SNACKS 3 pen 5 Taking   • pantoprazole (PROTONIX) 40 MG EC tablet Take 40 mg by mouth daily.   Taking   • POTASSIUM PO Take  by mouth.   Taking   • pravastatin (PRAVACHOL) 40 MG tablet Take 1 tablet by mouth Daily. 30 tablet 5 Taking   • sucralfate (CARAFATE) 1 G tablet Take 1 tablet by mouth 2 (two) times a  day.   Taking   • vitamin B-12 (CYANOCOBALAMIN) 1000 MCG tablet Take 1,000 mcg by mouth daily.   Taking   • XIFAXAN 550 MG tablet TAKE ONE TABLET BY MOUTH TWICE A DAY 60 tablet 5 Taking       Review of Systems   Constitutional: Negative for appetite change, chills, fatigue and fever.   HENT: Negative for congestion, mouth sores, postnasal drip, rhinorrhea, trouble swallowing and voice change.    Cardiovascular: Negative for chest pain, palpitations and leg swelling.   Gastrointestinal: Positive for abdominal pain and vomiting. Negative for abdominal distention, constipation, diarrhea and nausea.   Endocrine: Negative for cold intolerance and heat intolerance.   Genitourinary: Negative for difficulty urinating, dysuria, frequency and urgency.   Musculoskeletal: Negative for arthralgias and back pain.   Skin: Negative for pallor and rash.   Neurological: Positive for dizziness and light-headedness. Negative for weakness and headaches.   Psychiatric/Behavioral: Negative for agitation and confusion. The patient is not nervous/anxious.        Vital Signs past 24hrs  BP range: BP: (119-145)/(73-94) 125/85  Pulse range: Heart Rate:  [118-140] 118  Resp rate range: Resp:  [16-20] 20  Temp range: Temp (24hrs), Av °F (37.2 °C), Min:97.9 °F (36.6 °C), Max:99.5 °F (37.5 °C)    Oxygen range: SpO2:  [92 %-100 %] 94 %;  ;   O2 Device: room air  179 lb 10.8 oz (81.5 kg); Body mass index is 28.14 kg/(m^2).     Adult female lying in bed.  No acute distress.  She looks somewhat pale.  Pupils equal and reactive to light.  Oropharynx moist.  Very poor dentition.  No exudates or thrush.  Nasopharynx moist with no discharge.  Septum midline.  JVP not elevated.  Trachea midline thyroid not enlarged.  Lungs reveal bilateral air entry and clear to auscultation.  No wheezing.  Percussion note resonant.  Chest expansion equal with no chest wall deformities or tenderness.  Heart examination S1-S2 present.  Rhythm is tachycardic.  No  murmurs.  No edema in lower extremities.  Abdomen is soft bowel sounds are present.  Tenderness in the right upper quadrant.  No peripheral cyanosis or clubbing.  Patient moves all 4 extremities sensory motor intact.  No cervical, axillary, inguinal adenopathy.    Results Review:    I have reviewed the laboratory and imaging data since the last note by [facility] physician. My annotations are as noted in assessment and plan.    Medication Review:  I have reviewed the current MAR.  My annotations are as noted in assessment and plan.    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  She was started on octreotide and Protonix in the ED.  Will continue same.  Currently receiving 2 units of transfusion.  We will monitor hemoglobin and hematocrit.    Use ICU sliding scale for diabetes.  Keep nothing by mouth due to upcoming scopes.    Immunocompromised host due to rheumatologic disease and use of biologic agents.    Suspected interstitial lung disease reported by patient.  No current issues with same.  To my exam the lungs are clear.    Andrea Hall MD  02/15/17  7:15 AM     I spent +35 mins critical care time in care of this patient outside of any procedures.      EMR Dragon/Transcription disclaimer:   Much of this encounter note is an electronic transcription/translation of spoken language to printed text. The electronic translation of spoken language may permit erroneous, or at times, nonsensical words or phrases to be inadvertently transcribed; Although I have reviewed the note for such errors, some may still exist.

## 2017-02-15 NOTE — ANESTHESIA PREPROCEDURE EVALUATION
Anesthesia Evaluation     Patient summary reviewed and Nursing notes reviewed   NPO Status: waived due to emergency   Airway   Mallampati: II  TM distance: <3 FB  Neck ROM: limited  possible difficult intubation  Dental    (+) upper dentures and lower dentures    Pulmonary - normal exam   Cardiovascular     Rhythm: regular  Rate: abnormal        Neuro/Psych  GI/Hepatic/Renal/Endo    (+)  liver disease,     Musculoskeletal     Abdominal  - normal exam   Substance History      OB/GYN          Other                                    Anesthesia Plan    ASA 3 - emergent     MAC     intravenous induction   Anesthetic plan and risks discussed with patient.

## 2017-02-15 NOTE — ED PROVIDER NOTES
" EMERGENCY DEPARTMENT ENCOUNTER    CHIEF COMPLAINT  Chief Complaint: Dark emesis  History given by: Pt  History limited by: None  Room Number: 10/10  PMD: Blanca Pitts MD  GI - Dr. Coleman    HPI:  Pt is a 56 y.o. female who presents via EMS complaining of 1 episode of \"black\" emesis approx 1 hour ago. She also c/o weakness, dizziness, light-headedness. Hx of liver disease. Pt went to see Dr. Coleman yesterday and is scheduled for a CT abd today.     Duration:  1 hour ago  Onset: sudden  Timing: brief  Location: n/a  Radiation: n/a  Quality: black  Intensity/Severity: moderate  Progression: resolved  Associated Symptoms: weakness, dizziness, light-headedness  Aggravating Factors: none  Alleviating Factors: none  Previous Episodes: hx of liver disease.   Treatment before arrival: none    PAST MEDICAL HISTORY  Active Ambulatory Problems     Diagnosis Date Noted   • Cirrhosis of liver 03/08/2016   • Hepatic encephalopathy 03/08/2016   • Rheumatoid arthritis 03/08/2016   • Anxiety and depression 03/08/2016   • Type 2 diabetes mellitus, uncontrolled, with neuropathy 03/15/2016   • Fibrositis 03/15/2016   • Change in blood platelet count 03/15/2016   • Pancytopenia 04/12/2016   • Hypersplenism 04/12/2016   • Nausea 06/14/2016   • DUKES (nonalcoholic steatohepatitis) 06/14/2016   • Hyperlipidemia    • Generalized abdominal pain 02/14/2017     Resolved Ambulatory Problems     Diagnosis Date Noted   • No Resolved Ambulatory Problems     Past Medical History   Diagnosis Date   • Anemia    • Anxiety    • Arthritis    • Chromosomal abnormality    • Cirrhosis    • Colon polyps    • Deafness    • Depression    • Diabetes mellitus    • Duodenal ulcer with hemorrhage    • Fibromyalgia    • Gallbladder disease    • Gastroparesis    • Glaucoma    • Granuloma annulare    • H/O B12 deficiency    • H/O Bleeding ulcer    • H/O mixed connective tissue disorder    • Hemorrhoids    • Hiatal hernia    • Hx of being hospitalized    • Migraine  " "  • BRICE (obstructive sleep apnea)    • Pancreas divisum    • Peptic ulcer disease    • PONV (postoperative nausea and vomiting)    • RA (rheumatoid arthritis)    • Seizure disorder    • Seizures    • Systemic lupus        PAST SURGICAL HISTORY  Past Surgical History   Procedure Laterality Date   • Cholecystectomy     • Stomach surgery     • Hysterectomy     • Eye surgery     • Knee surgery Right 1995     \"right knee recontstruction\"   • Endoscopy and colonoscopy  2014     Dr. Rich Coleman with findings of candida esophagitis   • Liver biopsy  01/2015   • Endoscopy N/A 11/7/2016     Procedure: ESOPHAGOGASTRODUODENOSCOPY WITH COLD POLYPECTOMY, BANDING,  AND CLIPS TIMES 2;  Surgeon: Rich Coleman MD;  Location: [facility] ENDOSCOPY;  Service:    • Endoscopy N/A 12/22/2016     Procedure: ESOPHAGOGASTRODUODENOSCOPY WITH ESOPHAGEAL BANDING. 5 BANDS FIRED, 4 BANDS ADHERERD TO MUCOSA;  Surgeon: Rich Coleman MD;  Location: [facility] ENDOSCOPY;  Service:        FAMILY HISTORY  Family History   Problem Relation Age of Onset   • Liver disease Other    • Depression Other    • Heart disease Other    • Hypertension Other    • Diabetes Other    • Colon cancer Maternal Aunt    • Hypertension Mother    • Diabetes type II Mother    • Rheum arthritis Mother    • Liver disease Mother      DUKES   • Heart disease Father    • Diabetes type II Father    • Diabetes type II Sister    • Lupus Sister        SOCIAL HISTORY  Social History     Social History   • Marital status:      Spouse name: N/A   • Number of children: N/A   • Years of education: N/A     Occupational History   • Disabled      Social History Main Topics   • Smoking status: Former Smoker     Packs/day: 0.00     Years: 0.00     Quit date: 12/17/2015   • Smokeless tobacco: Not on file   • Alcohol use No   • Drug use: No   • Sexual activity: Defer     Other Topics Concern   • Not on file     Social History Narrative    Moved to [place] from [place]. She is currently " medically disabled.       ALLERGIES  Albuterol; Quinine derivatives; and Tramadol    REVIEW OF SYSTEMS  Review of Systems   Constitutional: Negative for fever.   HENT: Negative for sore throat.    Eyes: Negative.    Respiratory: Negative for cough and shortness of breath.    Cardiovascular: Negative for chest pain.   Gastrointestinal: Positive for vomiting. Negative for abdominal pain and diarrhea.   Genitourinary: Negative for dysuria.   Musculoskeletal: Negative for neck pain.   Skin: Negative for rash.   Allergic/Immunologic: Negative.    Neurological: Positive for dizziness, weakness and light-headedness. Negative for numbness and headaches.   Hematological: Negative.    Psychiatric/Behavioral: Negative.    All other systems reviewed and are negative.      PHYSICAL EXAM  ED Triage Vitals   Temp Heart Rate Resp BP SpO2   02/15/17 0219 02/15/17 0219 02/15/17 0219 02/15/17 0219 02/15/17 0219   97.9 °F (36.6 °C) 137 16 119/74 100 %      Temp src Heart Rate Source Patient Position BP Location FiO2 (%)   02/15/17 0219 02/15/17 0219 02/15/17 0219 02/15/17 0219 --   Tympanic Monitor Sitting Right arm        Physical Exam   Constitutional: She is oriented to person, place, and time. She appears distressed ( moderate).   HENT:   Head: Normocephalic and atraumatic.   Eyes: EOM are normal. Pupils are equal, round, and reactive to light.   Neck: Normal range of motion. Neck supple.   Cardiovascular: Regular rhythm and normal heart sounds.  Tachycardia present.    Pulmonary/Chest: Effort normal and breath sounds normal. No respiratory distress.   Abdominal: Soft. There is tenderness ( mild) in the epigastric area. There is no rebound and no guarding.   Musculoskeletal: Normal range of motion. She exhibits no edema.   Neurological: She is alert and oriented to person, place, and time. She has normal sensation and normal strength.   Skin: Skin is warm. No rash noted. She is diaphoretic. There is pallor.   Psychiatric: Mood and  affect normal.   Nursing note and vitals reviewed.      LAB RESULTS  Lab Results (last 24 hours)     Procedure Component Value Units Date/Time    CBC & Differential [[id number]] Collected:  02/15/17 0256    Specimen:  Blood Updated:  02/15/17 0316    Narrative:       The following orders were created for panel order CBC & Differential.  Procedure                               Abnormality         Status                     ---------                               -----------         ------                     CBC Auto Differential[[id number]]         Abnormal            Final result                 Please view results for these tests on the individual orders.    Comprehensive Metabolic Panel [[id number]]  (Abnormal) Collected:  02/15/17 0256    Specimen:  Blood Updated:  02/15/17 0338     Glucose 307 (H) mg/dL      BUN 36 (H) mg/dL      Creatinine 0.66 mg/dL      Sodium 135 (L) mmol/L      Potassium 5.3 (H) mmol/L      Chloride 96 (L) mmol/L      CO2 21.6 (L) mmol/L      Calcium 9.1 mg/dL      Total Protein 6.6 g/dL      Albumin 3.60 g/dL      ALT (SGPT) 36 (H) U/L      AST (SGOT) 29 U/L      Alkaline Phosphatase 83 U/L      Total Bilirubin 1.0 mg/dL      eGFR Non African Amer 93 mL/min/1.73      Globulin 3.0 gm/dL      A/G Ratio 1.2 g/dL      BUN/Creatinine Ratio 54.5 (H)      Anion Gap 17.4 mmol/L     Protime-INR [[id number]]  (Abnormal) Collected:  02/15/17 0256    Specimen:  Blood Updated:  02/15/17 0325     Protime 14.9 (H) Seconds      INR 1.21 (H)     CBC Auto Differential [[id number]]  (Abnormal) Collected:  02/15/17 0256    Specimen:  Blood Updated:  02/15/17 0316     WBC 13.79 (H) 10*3/mm3      RBC 3.79 (L) 10*6/mm3      Hemoglobin 10.7 (L) g/dL      Hematocrit 32.8 (L) %      MCV 86.5 fL      MCH 28.2 pg      MCHC 32.6 g/dL      RDW 15.1 (H) %      RDW-SD 47.5 fl      MPV 10.8 fL      Platelets 199 10*3/mm3      Neutrophil % 73.8 %      Lymphocyte % 17.0 (L) %      Monocyte % 7.5 %      Eosinophil % 0.8 %       Basophil % 0.3 %      Immature Grans % 0.6 (H) %      Neutrophils, Absolute 10.18 (H) 10*3/mm3      Lymphocytes, Absolute 2.34 10*3/mm3      Monocytes, Absolute 1.04 10*3/mm3      Eosinophils, Absolute 0.11 10*3/mm3      Basophils, Absolute 0.04 10*3/mm3      Immature Grans, Absolute 0.08 (H) 10*3/mm3           I ordered the above labs and reviewed the results    RADIOLOGY  No orders to display        I ordered the above noted radiological studies. Interpreted by radiologist. Discussed with radiologist       PROCEDURES  Critical Care  Performed by: LUANN OSORIO  Authorized by: LUANN OSORIO   Total critical care time: 35 minutes  Critical care time was exclusive of separately billable procedures and treating other patients.  Critical care was necessary to treat or prevent imminent or life-threatening deterioration of the following conditions: UGI bleed.  Critical care was time spent personally by me on the following activities: blood draw for specimens, development of treatment plan with patient or surrogate, discussions with consultants, interpretation of cardiac output measurements, evaluation of patient's response to treatment, examination of patient, obtaining history from patient or surrogate, ordering and performing treatments and interventions, ordering and review of laboratory studies, ordering and review of radiographic studies, pulse oximetry, re-evaluation of patient's condition and review of old charts.              PROGRESS AND CONSULTS  ED Course     0230  Ordered labs for further evaluation.     0258  Ordered fluids, Zofran, sandostatin and protonix.     0303  Will give pt full 30/kilo bolus due to pt's HR being in the 130s. Placed a call to gastro for consult to admit.     0310  Discussed case with Dr Gomez  Reviewed history, exam, results and treatments.  Discussed concerns and plan of care. Dr Gomez advises pt to be admitted to ICU and she will be seen in consult in the morning.      0311  Ordered EKG for further evaluation.    0319  Placed a call to Pulmonology for consult to admit.    0337  Discussed case with Dr Hall  Reviewed history, exam, results and treatments.  Discussed concerns and plan of care. Dr Hall accepts pt to be admitted to ICU.      MEDICAL DECISION MAKING  Results were reviewed/discussed with the patient and they were also made aware of online access. Pt also made aware that some labs, such as cultures, will not be resulted during ER visit and follow up with PMD is necessary.     MDM  Number of Diagnoses or Management Options  Secondary esophageal varices with bleeding:   UGI bleed:      Amount and/or Complexity of Data Reviewed  Clinical lab tests: reviewed and ordered (WBC 13.79 (H)   RBC 3.79 (L)   Hemoglobin 10.7 (L)   Hematocrit 32.8 (L))           DIAGNOSIS  Final diagnoses:   UGI bleed   Secondary esophageal varices with bleeding       DISPOSITION  ADMISSION    Discussed treatment plan and reason for admission with pt/family and admitting physician.  Pt/family voiced understanding of the plan for admission for further testing/treatment as needed.         Latest Documented Vital Signs:  As of 4:52 AM  BP- 137/80 HR- 119 Temp- 97.9 °F (36.6 °C) (Tympanic) O2 sat- 97%    --  Documentation assistance provided by tori Bishop  for Dr. Vick.  Information recorded by the tori was done at my direction and has been verified and validated by me.              Janet Bishop  02/15/17 0453       Nikolai Vick MD  02/16/17 0025

## 2017-02-15 NOTE — TELEPHONE ENCOUNTER
Received fax from [employer] stating Xifaxan needs prior auth.  Already called [employer] insurance plan on 1/18/17 and they stated medicine doesn't need prior auth, pharmacist would need to call them to ask for override.    Called [employer], spoke with pharmacist, advised of above.  He will call insurance to obtain override and call us back if any problems

## 2017-02-16 LAB
ABO + RH BLD: NORMAL
ABO + RH BLD: NORMAL
BH BB BLOOD EXPIRATION DATE: NORMAL
BH BB BLOOD EXPIRATION DATE: NORMAL
BH BB BLOOD TYPE BARCODE: 5100
BH BB BLOOD TYPE BARCODE: 5100
BH BB DISPENSE STATUS: NORMAL
BH BB DISPENSE STATUS: NORMAL
BH BB PRODUCT CODE: NORMAL
BH BB PRODUCT CODE: NORMAL
BH BB UNIT NUMBER: NORMAL
BH BB UNIT NUMBER: NORMAL
CK MB SERPL-CCNC: 1.34 NG/ML
CK SERPL-CCNC: 83 U/L (ref 20–180)
CROSSMATCH INTERPRETATION: NORMAL
CROSSMATCH INTERPRETATION: NORMAL
GLUCOSE BLDC GLUCOMTR-MCNC: 140 MG/DL (ref 70–130)
GLUCOSE BLDC GLUCOMTR-MCNC: 248 MG/DL (ref 70–130)
GLUCOSE BLDC GLUCOMTR-MCNC: 265 MG/DL (ref 70–130)
GLUCOSE BLDC GLUCOMTR-MCNC: 279 MG/DL (ref 70–130)
GLUCOSE BLDC GLUCOMTR-MCNC: 287 MG/DL (ref 70–130)
HCT VFR BLD AUTO: 27.4 % (ref 35.6–45.5)
HCT VFR BLD AUTO: 28.2 % (ref 35.6–45.5)
HCT VFR BLD AUTO: 28.8 % (ref 35.6–45.5)
HGB BLD-MCNC: 9.2 G/DL (ref 11.9–15.5)
HGB BLD-MCNC: 9.6 G/DL (ref 11.9–15.5)
HGB BLD-MCNC: 9.7 G/DL (ref 11.9–15.5)
TROPONIN T SERPL-MCNC: <0.01 NG/ML (ref 0–0.03)
UNIT  ABO: NORMAL
UNIT  ABO: NORMAL
UNIT  RH: NORMAL
UNIT  RH: NORMAL

## 2017-02-16 PROCEDURE — 82553 CREATINE MB FRACTION: CPT | Performed by: INTERNAL MEDICINE

## 2017-02-16 PROCEDURE — 97110 THERAPEUTIC EXERCISES: CPT

## 2017-02-16 PROCEDURE — 63710000001 INSULIN ASPART PER 5 UNITS: Performed by: INTERNAL MEDICINE

## 2017-02-16 PROCEDURE — 63710000001 DIPHENHYDRAMINE PER 50 MG: Performed by: INTERNAL MEDICINE

## 2017-02-16 PROCEDURE — 82962 GLUCOSE BLOOD TEST: CPT

## 2017-02-16 PROCEDURE — 25010000002 MORPHINE PER 10 MG: Performed by: INTERNAL MEDICINE

## 2017-02-16 PROCEDURE — 85014 HEMATOCRIT: CPT | Performed by: INTERNAL MEDICINE

## 2017-02-16 PROCEDURE — 25010000002 ONDANSETRON PER 1 MG: Performed by: INTERNAL MEDICINE

## 2017-02-16 PROCEDURE — 84484 ASSAY OF TROPONIN QUANT: CPT | Performed by: INTERNAL MEDICINE

## 2017-02-16 PROCEDURE — 82550 ASSAY OF CK (CPK): CPT | Performed by: INTERNAL MEDICINE

## 2017-02-16 PROCEDURE — 25010000002 OCTREOTIDE PER 25 MCG: Performed by: INTERNAL MEDICINE

## 2017-02-16 PROCEDURE — 85018 HEMOGLOBIN: CPT | Performed by: INTERNAL MEDICINE

## 2017-02-16 PROCEDURE — 25010000002 MORPHINE SULFATE (PF) 2 MG/ML SOLUTION: Performed by: INTERNAL MEDICINE

## 2017-02-16 PROCEDURE — 97161 PT EVAL LOW COMPLEX 20 MIN: CPT

## 2017-02-16 PROCEDURE — 99233 SBSQ HOSP IP/OBS HIGH 50: CPT | Performed by: INTERNAL MEDICINE

## 2017-02-16 RX ORDER — FOLIC ACID 1 MG/1
1 TABLET ORAL DAILY
Status: DISCONTINUED | OUTPATIENT
Start: 2017-02-16 | End: 2017-02-16

## 2017-02-16 RX ORDER — CHOLECALCIFEROL (VITAMIN D3) 125 MCG
1000 CAPSULE ORAL DAILY
Status: DISCONTINUED | OUTPATIENT
Start: 2017-02-16 | End: 2017-02-16

## 2017-02-16 RX ORDER — FERROUS SULFATE 325(65) MG
325 TABLET ORAL 2 TIMES DAILY WITH MEALS
Status: DISCONTINUED | OUTPATIENT
Start: 2017-02-16 | End: 2017-02-19 | Stop reason: HOSPADM

## 2017-02-16 RX ORDER — METOCLOPRAMIDE 5 MG/1
5 TABLET ORAL
Status: DISCONTINUED | OUTPATIENT
Start: 2017-02-16 | End: 2017-02-19 | Stop reason: HOSPADM

## 2017-02-16 RX ORDER — PRAVASTATIN SODIUM 40 MG
40 TABLET ORAL DAILY
Status: DISCONTINUED | OUTPATIENT
Start: 2017-02-16 | End: 2017-02-16

## 2017-02-16 RX ORDER — DIPHENHYDRAMINE HCL 25 MG
25 CAPSULE ORAL EVERY 6 HOURS PRN
Status: DISCONTINUED | OUTPATIENT
Start: 2017-02-16 | End: 2017-02-19 | Stop reason: HOSPADM

## 2017-02-16 RX ORDER — CHOLECALCIFEROL (VITAMIN D3) 125 MCG
1000 CAPSULE ORAL EVERY EVENING
Status: DISCONTINUED | OUTPATIENT
Start: 2017-02-16 | End: 2017-02-19 | Stop reason: HOSPADM

## 2017-02-16 RX ORDER — ALPRAZOLAM 0.5 MG/1
0.5 TABLET ORAL NIGHTLY PRN
Status: DISCONTINUED | OUTPATIENT
Start: 2017-02-16 | End: 2017-02-19 | Stop reason: HOSPADM

## 2017-02-16 RX ORDER — ONDANSETRON 2 MG/ML
4 INJECTION INTRAMUSCULAR; INTRAVENOUS EVERY 4 HOURS PRN
Status: DISCONTINUED | OUTPATIENT
Start: 2017-02-16 | End: 2017-02-19 | Stop reason: HOSPADM

## 2017-02-16 RX ORDER — PANTOPRAZOLE SODIUM 40 MG/1
40 TABLET, DELAYED RELEASE ORAL
Status: DISCONTINUED | OUTPATIENT
Start: 2017-02-16 | End: 2017-02-19 | Stop reason: HOSPADM

## 2017-02-16 RX ORDER — CYCLOSPORINE 0.5 MG/ML
1 EMULSION OPHTHALMIC 2 TIMES DAILY
Status: DISCONTINUED | OUTPATIENT
Start: 2017-02-16 | End: 2017-02-19 | Stop reason: HOSPADM

## 2017-02-16 RX ORDER — LEVETIRACETAM 500 MG/1
500 TABLET ORAL EVERY 12 HOURS SCHEDULED
Status: DISCONTINUED | OUTPATIENT
Start: 2017-02-16 | End: 2017-02-19 | Stop reason: HOSPADM

## 2017-02-16 RX ORDER — PREGABALIN 75 MG/1
75 CAPSULE ORAL EVERY 12 HOURS SCHEDULED
Status: DISCONTINUED | OUTPATIENT
Start: 2017-02-16 | End: 2017-02-19 | Stop reason: HOSPADM

## 2017-02-16 RX ORDER — DOCUSATE SODIUM 100 MG/1
100 CAPSULE, LIQUID FILLED ORAL AS NEEDED
Status: DISCONTINUED | OUTPATIENT
Start: 2017-02-16 | End: 2017-02-19 | Stop reason: HOSPADM

## 2017-02-16 RX ORDER — FOLIC ACID 1 MG/1
1 TABLET ORAL EVERY EVENING
Status: DISCONTINUED | OUTPATIENT
Start: 2017-02-16 | End: 2017-02-19 | Stop reason: HOSPADM

## 2017-02-16 RX ORDER — MELATONIN
1000 DAILY
Status: DISCONTINUED | OUTPATIENT
Start: 2017-02-16 | End: 2017-02-19 | Stop reason: HOSPADM

## 2017-02-16 RX ORDER — CYCLOBENZAPRINE HCL 5 MG
5 TABLET ORAL 2 TIMES DAILY PRN
Status: DISCONTINUED | OUTPATIENT
Start: 2017-02-16 | End: 2017-02-19 | Stop reason: HOSPADM

## 2017-02-16 RX ORDER — SUCRALFATE 1 G/1
1 TABLET ORAL 2 TIMES DAILY
Status: DISCONTINUED | OUTPATIENT
Start: 2017-02-16 | End: 2017-02-19 | Stop reason: HOSPADM

## 2017-02-16 RX ORDER — PRAVASTATIN SODIUM 40 MG
40 TABLET ORAL NIGHTLY
Status: DISCONTINUED | OUTPATIENT
Start: 2017-02-16 | End: 2017-02-19 | Stop reason: HOSPADM

## 2017-02-16 RX ADMIN — SODIUM CHLORIDE 125 ML/HR: 9 INJECTION, SOLUTION INTRAVENOUS at 08:22

## 2017-02-16 RX ADMIN — PREGABALIN 75 MG: 75 CAPSULE ORAL at 16:02

## 2017-02-16 RX ADMIN — SODIUM CHLORIDE 125 ML/HR: 9 INJECTION, SOLUTION INTRAVENOUS at 23:41

## 2017-02-16 RX ADMIN — VITAMIN D, TAB 1000IU (100/BT) 1000 UNITS: 25 TAB at 13:39

## 2017-02-16 RX ADMIN — LEVETIRACETAM 500 MG: 500 TABLET, FILM COATED ORAL at 13:39

## 2017-02-16 RX ADMIN — INSULIN ASPART 6 UNITS: 100 INJECTION, SOLUTION INTRAVENOUS; SUBCUTANEOUS at 19:19

## 2017-02-16 RX ADMIN — MORPHINE SULFATE 2 MG: 2 INJECTION, SOLUTION INTRAMUSCULAR; INTRAVENOUS at 01:42

## 2017-02-16 RX ADMIN — SUCRALFATE 1 G: 1 TABLET ORAL at 13:39

## 2017-02-16 RX ADMIN — SODIUM CHLORIDE 125 ML/HR: 9 INJECTION, SOLUTION INTRAVENOUS at 16:21

## 2017-02-16 RX ADMIN — OCTREOTIDE ACETATE 25 MCG/HR: 500 INJECTION, SOLUTION INTRAVENOUS; SUBCUTANEOUS at 11:25

## 2017-02-16 RX ADMIN — MORPHINE SULFATE 2 MG: 2 INJECTION, SOLUTION INTRAMUSCULAR; INTRAVENOUS at 12:47

## 2017-02-16 RX ADMIN — INSULIN ASPART 12 UNITS: 100 INJECTION, SOLUTION INTRAVENOUS; SUBCUTANEOUS at 04:00

## 2017-02-16 RX ADMIN — FOLIC ACID 1 MG: 1 TABLET ORAL at 18:20

## 2017-02-16 RX ADMIN — DULOXETINE HYDROCHLORIDE 30 MG: 60 CAPSULE, DELAYED RELEASE ORAL at 18:20

## 2017-02-16 RX ADMIN — INSULIN ASPART 11 UNITS: 100 INJECTION, SOLUTION INTRAVENOUS; SUBCUTANEOUS at 08:25

## 2017-02-16 RX ADMIN — ONDANSETRON 4 MG: 2 INJECTION INTRAMUSCULAR; INTRAVENOUS at 18:20

## 2017-02-16 RX ADMIN — ONDANSETRON 4 MG: 2 INJECTION INTRAMUSCULAR; INTRAVENOUS at 14:20

## 2017-02-16 RX ADMIN — DIPHENHYDRAMINE HYDROCHLORIDE 25 MG: 25 CAPSULE ORAL at 23:40

## 2017-02-16 RX ADMIN — PANTOPRAZOLE SODIUM 40 MG: 40 TABLET, DELAYED RELEASE ORAL at 18:20

## 2017-02-16 RX ADMIN — Medication 1000 MCG: at 18:20

## 2017-02-16 RX ADMIN — ALPRAZOLAM 0.5 MG: 0.5 TABLET ORAL at 23:40

## 2017-02-16 RX ADMIN — CYCLOBENZAPRINE HYDROCHLORIDE 5 MG: 5 TABLET, FILM COATED ORAL at 23:41

## 2017-02-16 RX ADMIN — FERROUS SULFATE TAB 325 MG (65 MG ELEMENTAL FE) 325 MG: 325 (65 FE) TAB at 13:39

## 2017-02-16 RX ADMIN — METOCLOPRAMIDE 5 MG: 5 TABLET ORAL at 13:38

## 2017-02-16 RX ADMIN — RIFAXIMIN 550 MG: 550 TABLET ORAL at 23:41

## 2017-02-16 RX ADMIN — MORPHINE SULFATE 2 MG: 2 INJECTION, SOLUTION INTRAMUSCULAR; INTRAVENOUS at 06:53

## 2017-02-16 RX ADMIN — METOCLOPRAMIDE 5 MG: 5 TABLET ORAL at 18:20

## 2017-02-16 RX ADMIN — FERROUS SULFATE TAB 325 MG (65 MG ELEMENTAL FE) 325 MG: 325 (65 FE) TAB at 18:20

## 2017-02-16 RX ADMIN — CYCLOSPORINE 1 DROP: 0.5 EMULSION OPHTHALMIC at 16:02

## 2017-02-16 RX ADMIN — LEVETIRACETAM 500 MG: 500 TABLET, FILM COATED ORAL at 23:41

## 2017-02-16 RX ADMIN — MORPHINE SULFATE 2 MG: 2 INJECTION, SOLUTION INTRAMUSCULAR; INTRAVENOUS at 18:21

## 2017-02-16 RX ADMIN — METOCLOPRAMIDE 5 MG: 5 TABLET ORAL at 23:41

## 2017-02-16 RX ADMIN — RIFAXIMIN 550 MG: 550 TABLET ORAL at 13:38

## 2017-02-16 RX ADMIN — PREGABALIN 75 MG: 75 CAPSULE ORAL at 23:40

## 2017-02-16 RX ADMIN — PANTOPRAZOLE SODIUM 8 MG/HR: 40 INJECTION, POWDER, FOR SOLUTION INTRAVENOUS at 14:42

## 2017-02-16 RX ADMIN — PRAVASTATIN SODIUM 40 MG: 40 TABLET ORAL at 23:41

## 2017-02-16 RX ADMIN — SUCRALFATE 1 G: 1 TABLET ORAL at 18:20

## 2017-02-16 NOTE — PROGRESS NOTES
Discharge Planning Assessment  [facility]     Patient Name: Silvia Zabala  MRN: [id number]  Today's Date: 2/16/2017    Admit Date: 2/15/2017          Discharge Needs Assessment       02/16/17 1521    Living Environment    Lives With spouse    Living Arrangements house    Provides Primary Care For no one    Quality Of Family Relationships supportive    Able to Return to Prior Living Arrangements yes    Discharge Needs Assessment    Concerns To Be Addressed denies needs/concerns at this time    Readmission Within The Last 30 Days no previous admission in last 30 days    Anticipated Changes Related to Illness none    Equipment Currently Used at Home none    Equipment Needed After Discharge none    Transportation Available family or friend will provide    Discharge Disposition still a patient            Discharge Plan       02/16/17 1522    Case Management/Social Work Plan    Plan Home with assist.    Patient/Family In Agreement With Plan yes    Additional Comments Spoke with pt at bedside.  Introduced self and explained role.  CCP contact information provided.  Face sheet verified. IMM received.  Pt denies any prior use of HH or SNF.  Lives at home with .  Is independent with ADLs.  Refuses HH at this time but will let CCP know.  CCP will follow.        Discharge Placement     No information found        Expected Discharge Date and Time     Expected Discharge Date Expected Discharge Time    Feb 16, 2017               Demographic Summary       02/16/17 1520    Referral Information    Admission Type inpatient    Arrived From home or self-care    Contact Information    Comments permission granted to speak with her  Jose    Primary Care Physician Information    Name Dr. Blanca Pitts            Functional Status       02/16/17 1521    Functional Status Prior    Ambulation 0-->independent    Transferring 0-->independent    Toileting 0-->independent    Bathing 0-->independent    Dressing 0-->independent     Eating 0-->independent    Communication 0-->understands/communicates without difficulty    Swallowing 0-->swallows foods/liquids without difficulty    IADL    Medications independent    Meal Preparation independent    Housekeeping independent    Laundry independent    Shopping independent    Oral Care independent            Psychosocial     None            Abuse/Neglect     None            Legal     None            Substance Abuse     None            Patient Forms     None          Elvia Phipps, SRIKANTH

## 2017-02-16 NOTE — PLAN OF CARE
Problem: Patient Care Overview (Adult)  Goal: Plan of Care Review  Outcome: Ongoing (interventions implemented as appropriate)    02/15/17 1948   Coping/Psychosocial Response Interventions   Plan Of Care Reviewed With patient;spouse   Patient Care Overview   Progress improving   Outcome Evaluation   Outcome Summary/Follow up Plan Patient admitted from ER after vomiting large amount of BRB. She received 2 units PRBC by noon. Intermittant nausea but no emesis. EGD by Dr Coleman at 2pm found varices (which were banded) but no active bleeding. Pt had black melena stools approximately every hour throughout shift. H&H and vitals remained stable/improved. Pt remains on octreotide and protonix drips. Diet was advanced to full liquids after EDG; pt only drank diet ginger ale by end of shift and tolerated it ok.  Pt's  said it was ok to give updates to a sister in [place] who may call. (Both have the access code.)          Problem: Gastrointestinal Bleeding (Adult)  Goal: Signs and Symptoms of Listed Potential Problems Will be Absent or Manageable (Gastrointestinal Bleeding)  Outcome: Ongoing (interventions implemented as appropriate)    Problem: Fall Risk (Adult)  Goal: Identify Related Risk Factors and Signs and Symptoms  Outcome: Outcome(s) achieved Date Met:  02/15/17  Goal: Absence of Falls  Outcome: Ongoing (interventions implemented as appropriate)

## 2017-02-16 NOTE — PAYOR COMM NOTE
"Silvia Luther (56 y.o. Female)         ATTENTION; NURSE REVIEW, AUTH PENDING [id number], PATIENT IN ICU LEVEL OF CARE, REPLY TO UR       DEPT, PHILIP HENDERSON N   080 2016       Date of Birth Social Security Number Address Home Phone MRN    1960  [id number] [place] [address] [phone] [id number]    Christianity Marital Status          Unknown        Admission Date Admission Type Admitting Provider Attending Provider Department, Room/Bed    2/15/17 Emergency Andrea Hall MD Jain, Subin, MD [facility] INTENSIVE CARE, 376/1    Discharge Date Discharge Disposition Discharge Destination                      Attending Provider: Andrea Hall MD     Allergies:  Albuterol, Quinine Derivatives, Tramadol    Isolation:  None   Infection:  None   Code Status:  Not on file    Ht:  67\" (170.2 cm)   Wt:  179 lb 10.8 oz (81.5 kg)    Admission Cmt:  None   Principal Problem:  None                Active Insurance as of 2/15/2017     Primary Coverage     Payor Plan Insurance Group Employer/Plan Group    [employer]HEM BLUE CROSS AN[employer] BLUE SHIELD PPO [id number]     Payor Plan Address Payor Plan Phone Number Effective From Effective To    [address] [phone] 1/1/2015     [place] [address]       Subscriber Name Subscriber Birth Date Member ID       JOSE LUTHER 7/12/1968 [id number]           Secondary Coverage     Payor Plan Insurance Group Employer/Plan Group    MEDICARE MEDICARE A & B      Payor Plan Address Payor Plan Phone Number Effective From Effective To    [address] [phone] 9/1/2003     [place] [address]       Subscriber Name Subscriber Birth Date Member ID       SILVIA LUTHER 1960 [id number]                 Emergency Contacts      (Rel.) Home Phone Work Phone Mobile Phone    Jose Luther (Spouse) [phone] -- [phone]    Kray Luther (Other) [phone] -- --    Mack Lora (Father) -- " -- [phone]               History & Physical      Andrea Hall MD at 2/15/2017  7:15 AM          [facility]  Phone: [phone]  Andrea Hall MD      Subjective   LOS: 0 days     56-year-old female with acute upper GI bleeding.  She has a history of an DUKES and esophageal varices.  She had esophageal varices banded in December 2016.  She recently also had an esophageal ulcer that was cauterized.  She was evaluated by the liver transplant team and is considered to be not a transplant candidate as of yet.  Patient states that she always has some abdominal pain.  She has been somewhat nauseous in about 3 weeks ago had an episode of emesis.  However this was not bloody.  Today she had the bright red blood emesis.  On the ER she had very large bloody emesis.  She has been dizzy and lightheaded.  However hemoglobin on entry was okay.    Patient has a significant history of rheumatoid arthritis and lupus.  She is on biologic agents for same.  More recently she is suspected of having interstitial lung disease related to her rheumatological issues.  She does not use oxygen at home and does not have any exercise limitation due to shortness of breath as of yet.  She does have diabetes mellitus.  She denies any heart disease or prior history of cancer.    Patient reports daytime sleepiness and snoring at night.  She has never been evaluated for sleep apnea.     I have reviewed and edited the Past Medical History, Past Surgical History, Home Medications, Social History and Family History as of 7:15 AM on 02/15/17.    Prescriptions Prior to Admission   Medication Sig Dispense Refill Last Dose   • ALPRAZolam (XANAX) 0.5 MG tablet TAKE ONE TABLET BY MOUTH ONCE NIGHTLY AS NEEDED FOR ANXIETY 60 tablet 2 Taking   • cholecalciferol (VITAMIN D3) 1000 UNITS tablet Take 1,000 Units by mouth Daily.   Taking   • cyclobenzaprine (FLEXERIL) 5 MG tablet Take 1 tablet by mouth 2 (two) times a day as needed for muscle spasms.  30 tablet 2 Not Taking   • cycloSPORINE (RESTASIS) 0.05 % ophthalmic emulsion 1 drop 2 (two) times a day.   Not Taking   • diphenhydrAMINE (BENADRYL) 25 mg capsule Take 25 mg by mouth Every 6 (Six) Hours As Needed.   Taking   • docusate sodium (COLACE) 100 MG capsule Take 100 mg by mouth as needed for constipation.   Taking   • DULoxetine (CYMBALTA) 30 MG capsule Take 3 capsules daily 90 capsule 5 Taking   • Etanercept 50 MG/ML solution auto-injector Inject under the skin.   Taking   • ferrous sulfate 325 (65 FE) MG tablet TAKE ONE TABLET BY MOUTH TWICE A DAY 60 tablet 6 Taking   • folic acid (FOLVITE) 1 MG tablet TAKE ONE TABLET BY MOUTH DAILY 30 tablet 0 Taking   • glucose blood (ONE TOUCH ULTRA TEST) test strip Test blood sugar 4 times daily 200 each 5 Taking   • Insulin Glargine (LANTUS SOLOSTAR) 100 UNIT/ML injection pen INJECT 80 UNITS UNDER THE SKIN DAILY 3 pen 3 Taking   • Insulin Pen Needle 31G X 5 MM misc To inject 5 -6 times per day 100 each 11 Taking   • levETIRAcetam (KEPPRA) 500 MG tablet TAKE ONE TABLET BY MOUTH TWICE A DAY 60 tablet 2 Taking   • LYRICA 75 MG capsule TAKE ONE CAPSULE BY MOUTH TWICE A DAY 60 capsule 1 Taking   • MELATONIN PO Take 20 mg by mouth Daily.   Taking   • metFORMIN (GLUCOPHAGE) 1000 MG tablet Take 1 tablet by mouth 2 (Two) Times a Day With Meals. (Patient taking differently: Take 1,000 mg by mouth 3 (Three) Times a Day.) 60 tablet 11 Taking   • metoclopramide (REGLAN) 5 MG tablet TAKE ONE TABLET BY MOUTH BEFORE MEALS AND AT BEDTIME; **MUST CALL MD FOR APPOINTMENT 60 tablet 0 Taking   • Multiple Vitamin (MULTI-VITAMIN PO) Take  by mouth.   Taking   • NOVOLOG FLEXPEN 100 UNIT/ML solution pen-injector sc pen INJECT 40 UNITS UNDER THE SKIN BEFORE EACH MEAL AND 25 units before SNACKS 3 pen 5 Taking   • pantoprazole (PROTONIX) 40 MG EC tablet Take 40 mg by mouth daily.   Taking   • POTASSIUM PO Take  by mouth.   Taking   • pravastatin (PRAVACHOL) 40 MG tablet Take 1 tablet by mouth  Daily. 30 tablet 5 Taking   • sucralfate (CARAFATE) 1 G tablet Take 1 tablet by mouth 2 (two) times a day.   Taking   • vitamin B-12 (CYANOCOBALAMIN) 1000 MCG tablet Take 1,000 mcg by mouth daily.   Taking   • XIFAXAN 550 MG tablet TAKE ONE TABLET BY MOUTH TWICE A DAY 60 tablet 5 Taking       Review of Systems   Constitutional: Negative for appetite change, chills, fatigue and fever.   HENT: Negative for congestion, mouth sores, postnasal drip, rhinorrhea, trouble swallowing and voice change.    Cardiovascular: Negative for chest pain, palpitations and leg swelling.   Gastrointestinal: Positive for abdominal pain and vomiting. Negative for abdominal distention, constipation, diarrhea and nausea.   Endocrine: Negative for cold intolerance and heat intolerance.   Genitourinary: Negative for difficulty urinating, dysuria, frequency and urgency.   Musculoskeletal: Negative for arthralgias and back pain.   Skin: Negative for pallor and rash.   Neurological: Positive for dizziness and light-headedness. Negative for weakness and headaches.   Psychiatric/Behavioral: Negative for agitation and confusion. The patient is not nervous/anxious.        Vital Signs past 24hrs  BP range: BP: (119-145)/(73-94) 125/85  Pulse range: Heart Rate:  [118-140] 118  Resp rate range: Resp:  [16-20] 20  Temp range: Temp (24hrs), Av °F (37.2 °C), Min:97.9 °F (36.6 °C), Max:99.5 °F (37.5 °C)    Oxygen range: SpO2:  [92 %-100 %] 94 %;  ;   O2 Device: room air  179 lb 10.8 oz (81.5 kg); Body mass index is 28.14 kg/(m^2).     Adult female lying in bed.  No acute distress.  She looks somewhat pale.  Pupils equal and reactive to light.  Oropharynx moist.  Very poor dentition.  No exudates or thrush.  Nasopharynx moist with no discharge.  Septum midline.  JVP not elevated.  Trachea midline thyroid not enlarged.  Lungs reveal bilateral air entry and clear to auscultation.  No wheezing.  Percussion note resonant.  Chest expansion equal with no chest  wall deformities or tenderness.  Heart examination S1-S2 present.  Rhythm is tachycardic.  No murmurs.  No edema in lower extremities.  Abdomen is soft bowel sounds are present.  Tenderness in the right upper quadrant.  No peripheral cyanosis or clubbing.  Patient moves all 4 extremities sensory motor intact.  No cervical, axillary, inguinal adenopathy.    Results Review:    I have reviewed the laboratory and imaging data since the last note by LPC physician. My annotations are as noted in assessment and plan.    Medication Review:  I have reviewed the current MAR.  My annotations are as noted in assessment and plan.    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  She was started on octreotide and Protonix in the ED.  Will continue same.  Currently receiving 2 units of transfusion.  We will monitor hemoglobin and hematocrit.    Use ICU sliding scale for diabetes.  Keep nothing by mouth due to upcoming scopes.    Immunocompromised host due to rheumatologic disease and use of biologic agents.    Suspected interstitial lung disease reported by patient.  No current issues with same.  To my exam the lungs are clear.    Andrea Hall MD  02/15/17  7:15 AM     I spent +35 mins critical care time in care of this patient outside of any procedures.      EMR Dragon/Transcription disclaimer:   Much of this encounter note is an electronic transcription/translation of spoken language to printed text. The electronic translation of spoken language may permit erroneous, or at times, nonsensical words or phrases to be inadvertently transcribed; Although I have reviewed the note for such errors, some may still exist.         "Electronically signed by Andrea Hall MD at 2/15/2017  7:31 AM           Emergency Department Notes      Destinee Li RN at 2/15/2017  2:19 AM          Blood glucose 294 per ems     Destinee Li RN  02/15/17 0220       Electronically signed by Destinee Li RN at 2/15/2017  2:20 AM      Nikolai Vick MD at 2/15/2017  2:52 AM      Procedure Orders:    1. Critical Care [[id number]] ordered by Andrea Hall MD at 02/15/17 0452                 EMERGENCY DEPARTMENT ENCOUNTER    CHIEF COMPLAINT  Chief Complaint: Dark emesis  History given by: Pt  History limited by: None  Room Number: 10/10  PMD: Blanca Pitts MD  GI - Dr. Coleman    HPI:  Pt is a 56 y.o. female who presents via EMS complaining of 1 episode of \"black\" emesis approx 1 hour ago. She also c/o weakness, dizziness, light-headedness. Hx of liver disease. Pt went to see Dr. Coleman yesterday and is scheduled for a CT abd today.     Duration:  1 hour ago  Onset: sudden  Timing: brief  Location: n/a  Radiation: n/a  Quality: black  Intensity/Severity: moderate  Progression: resolved  Associated Symptoms: weakness, dizziness, light-headedness  Aggravating Factors: none  Alleviating Factors: none  Previous Episodes: hx of liver disease.   Treatment before arrival: none    PAST MEDICAL HISTORY  Active Ambulatory Problems     Diagnosis Date Noted   • Cirrhosis of liver 03/08/2016   • Hepatic encephalopathy 03/08/2016   • Rheumatoid arthritis 03/08/2016   • Anxiety and depression 03/08/2016   • Type 2 diabetes mellitus, uncontrolled, with neuropathy 03/15/2016   • Fibrositis 03/15/2016   • Change in blood platelet count 03/15/2016   • Pancytopenia 04/12/2016   • Hypersplenism 04/12/2016   • Nausea 06/14/2016   • DUKES (nonalcoholic steatohepatitis) 06/14/2016   • Hyperlipidemia    • Generalized abdominal pain 02/14/2017     Resolved Ambulatory Problems     Diagnosis Date Noted   • No Resolved Ambulatory Problems     Past Medical History   Diagnosis Date " "  • Anemia    • Anxiety    • Arthritis    • Chromosomal abnormality    • Cirrhosis    • Colon polyps    • Deafness    • Depression    • Diabetes mellitus    • Duodenal ulcer with hemorrhage    • Fibromyalgia    • Gallbladder disease    • Gastroparesis    • Glaucoma    • Granuloma annulare    • H/O B12 deficiency    • H/O Bleeding ulcer    • H/O mixed connective tissue disorder    • Hemorrhoids    • Hiatal hernia    • Hx of being hospitalized    • Migraine    • BRICE (obstructive sleep apnea)    • Pancreas divisum    • Peptic ulcer disease    • PONV (postoperative nausea and vomiting)    • RA (rheumatoid arthritis)    • Seizure disorder    • Seizures    • Systemic lupus        PAST SURGICAL HISTORY  Past Surgical History   Procedure Laterality Date   • Cholecystectomy     • Stomach surgery     • Hysterectomy     • Eye surgery     • Knee surgery Right 1995     \"right knee recontstruction\"   • Endoscopy and colonoscopy  2014     Dr. Rich Coleman with findings of candida esophagitis   • Liver biopsy  01/2015   • Endoscopy N/A 11/7/2016     Procedure: ESOPHAGOGASTRODUODENOSCOPY WITH COLD POLYPECTOMY, BANDING,  AND CLIPS TIMES 2;  Surgeon: Rich Coleman MD;  Location: [facility] ENDOSCOPY;  Service:    • Endoscopy N/A 12/22/2016     Procedure: ESOPHAGOGASTRODUODENOSCOPY WITH ESOPHAGEAL BANDING. 5 BANDS FIRED, 4 BANDS ADHERERD TO MUCOSA;  Surgeon: Rich Coleman MD;  Location: [facility] ENDOSCOPY;  Service:        FAMILY HISTORY  Family History   Problem Relation Age of Onset   • Liver disease Other    • Depression Other    • Heart disease Other    • Hypertension Other    • Diabetes Other    • Colon cancer Maternal Aunt    • Hypertension Mother    • Diabetes type II Mother    • Rheum arthritis Mother    • Liver disease Mother      DUKES   • Heart disease Father    • Diabetes type II Father    • Diabetes type II Sister    • Lupus Sister        SOCIAL HISTORY  Social History     Social History   • Marital status:      " Spouse name: N/A   • Number of children: N/A   • Years of education: N/A     Occupational History   • Disabled      Social History Main Topics   • Smoking status: Former Smoker     Packs/day: 0.00     Years: 0.00     Quit date: 12/17/2015   • Smokeless tobacco: Not on file   • Alcohol use No   • Drug use: No   • Sexual activity: Defer     Other Topics Concern   • Not on file     Social History Narrative    Moved to [place] from [place]. She is currently medically disabled.       ALLERGIES  Albuterol; Quinine derivatives; and Tramadol    REVIEW OF SYSTEMS  Review of Systems   Constitutional: Negative for fever.   HENT: Negative for sore throat.    Eyes: Negative.    Respiratory: Negative for cough and shortness of breath.    Cardiovascular: Negative for chest pain.   Gastrointestinal: Positive for vomiting. Negative for abdominal pain and diarrhea.   Genitourinary: Negative for dysuria.   Musculoskeletal: Negative for neck pain.   Skin: Negative for rash.   Allergic/Immunologic: Negative.    Neurological: Positive for dizziness, weakness and light-headedness. Negative for numbness and headaches.   Hematological: Negative.    Psychiatric/Behavioral: Negative.    All other systems reviewed and are negative.      PHYSICAL EXAM  ED Triage Vitals   Temp Heart Rate Resp BP SpO2   02/15/17 0219 02/15/17 0219 02/15/17 0219 02/15/17 0219 02/15/17 0219   97.9 °F (36.6 °C) 137 16 119/74 100 %      Temp src Heart Rate Source Patient Position BP Location FiO2 (%)   02/15/17 0219 02/15/17 0219 02/15/17 0219 02/15/17 0219 --   Tympanic Monitor Sitting Right arm        Physical Exam   Constitutional: She is oriented to person, place, and time. She appears distressed ( moderate).   HENT:   Head: Normocephalic and atraumatic.   Eyes: EOM are normal. Pupils are equal, round, and reactive to light.   Neck: Normal range of motion. Neck supple.   Cardiovascular: Regular rhythm and normal heart sounds.  Tachycardia present.     Pulmonary/Chest: Effort normal and breath sounds normal. No respiratory distress.   Abdominal: Soft. There is tenderness ( mild) in the epigastric area. There is no rebound and no guarding.   Musculoskeletal: Normal range of motion. She exhibits no edema.   Neurological: She is alert and oriented to person, place, and time. She has normal sensation and normal strength.   Skin: Skin is warm. No rash noted. She is diaphoretic. There is pallor.   Psychiatric: Mood and affect normal.   Nursing note and vitals reviewed.      LAB RESULTS  Lab Results (last 24 hours)     Procedure Component Value Units Date/Time    CBC & Differential [[id number]] Collected:  02/15/17 0256    Specimen:  Blood Updated:  02/15/17 0316    Narrative:       The following orders were created for panel order CBC & Differential.  Procedure                               Abnormality         Status                     ---------                               -----------         ------                     CBC Auto Differential[[id number]]         Abnormal            Final result                 Please view results for these tests on the individual orders.    Comprehensive Metabolic Panel [[id number]]  (Abnormal) Collected:  02/15/17 0256    Specimen:  Blood Updated:  02/15/17 0338     Glucose 307 (H) mg/dL      BUN 36 (H) mg/dL      Creatinine 0.66 mg/dL      Sodium 135 (L) mmol/L      Potassium 5.3 (H) mmol/L      Chloride 96 (L) mmol/L      CO2 21.6 (L) mmol/L      Calcium 9.1 mg/dL      Total Protein 6.6 g/dL      Albumin 3.60 g/dL      ALT (SGPT) 36 (H) U/L      AST (SGOT) 29 U/L      Alkaline Phosphatase 83 U/L      Total Bilirubin 1.0 mg/dL      eGFR Non African Amer 93 mL/min/1.73      Globulin 3.0 gm/dL      A/G Ratio 1.2 g/dL      BUN/Creatinine Ratio 54.5 (H)      Anion Gap 17.4 mmol/L     Protime-INR [[id number]]  (Abnormal) Collected:  02/15/17 0256    Specimen:  Blood Updated:  02/15/17 0325     Protime 14.9 (H) Seconds      INR 1.21 (H)     CBC  Auto Differential [[id number]]  (Abnormal) Collected:  02/15/17 0256    Specimen:  Blood Updated:  02/15/17 0316     WBC 13.79 (H) 10*3/mm3      RBC 3.79 (L) 10*6/mm3      Hemoglobin 10.7 (L) g/dL      Hematocrit 32.8 (L) %      MCV 86.5 fL      MCH 28.2 pg      MCHC 32.6 g/dL      RDW 15.1 (H) %      RDW-SD 47.5 fl      MPV 10.8 fL      Platelets 199 10*3/mm3      Neutrophil % 73.8 %      Lymphocyte % 17.0 (L) %      Monocyte % 7.5 %      Eosinophil % 0.8 %      Basophil % 0.3 %      Immature Grans % 0.6 (H) %      Neutrophils, Absolute 10.18 (H) 10*3/mm3      Lymphocytes, Absolute 2.34 10*3/mm3      Monocytes, Absolute 1.04 10*3/mm3      Eosinophils, Absolute 0.11 10*3/mm3      Basophils, Absolute 0.04 10*3/mm3      Immature Grans, Absolute 0.08 (H) 10*3/mm3           I ordered the above labs and reviewed the results    RADIOLOGY  No orders to display        I ordered the above noted radiological studies. Interpreted by radiologist. Discussed with radiologist       PROCEDURES  Critical Care  Performed by: LUANN OSORIO  Authorized by: LUANN OSORIO   Total critical care time: 35 minutes  Critical care time was exclusive of separately billable procedures and treating other patients.  Critical care was necessary to treat or prevent imminent or life-threatening deterioration of the following conditions: UGI bleed.  Critical care was time spent personally by me on the following activities: blood draw for specimens, development of treatment plan with patient or surrogate, discussions with consultants, interpretation of cardiac output measurements, evaluation of patient's response to treatment, examination of patient, obtaining history from patient or surrogate, ordering and performing treatments and interventions, ordering and review of laboratory studies, ordering and review of radiographic studies, pulse oximetry, re-evaluation of patient's condition and review of old charts.              PROGRESS AND CONSULTS  ED  Course     0230  Ordered labs for further evaluation.     0258  Ordered fluids, Zofran, sandostatin and protonix.     0303  Will give pt full 30/kilo bolus due to pt's HR being in the 130s. Placed a call to gastro for consult to admit.     0310  Discussed case with Dr Gomez  Reviewed history, exam, results and treatments.  Discussed concerns and plan of care. Dr Gomez advises pt to be admitted to ICU and she will be seen in consult in the morning.     0311  Ordered EKG for further evaluation.    0319  Placed a call to Pulmonology for consult to admit.    0337  Discussed case with Dr Hall  Reviewed history, exam, results and treatments.  Discussed concerns and plan of care. Dr Hall accepts pt to be admitted to ICU.      MEDICAL DECISION MAKING  Results were reviewed/discussed with the patient and they were also made aware of online access. Pt also made aware that some labs, such as cultures, will not be resulted during ER visit and follow up with PMD is necessary.     MDM  Number of Diagnoses or Management Options  Secondary esophageal varices with bleeding:   UGI bleed:      Amount and/or Complexity of Data Reviewed  Clinical lab tests: reviewed and ordered (WBC 13.79 (H)   RBC 3.79 (L)   Hemoglobin 10.7 (L)   Hematocrit 32.8 (L))           DIAGNOSIS  Final diagnoses:   UGI bleed   Secondary esophageal varices with bleeding       DISPOSITION  ADMISSION    Discussed treatment plan and reason for admission with pt/family and admitting physician.  Pt/family voiced understanding of the plan for admission for further testing/treatment as needed.         Latest Documented Vital Signs:  As of 4:52 AM  BP- 137/80 HR- 119 Temp- 97.9 °F (36.6 °C) (Tympanic) O2 sat- 97%    --  Documentation assistance provided by tori Bishop  for Dr. Vick.  Information recorded by the tori was done at my direction and has been verified and validated by me.     Janet Bishop  02/15/17 0453       Nikolai Vick MD  02/16/17  0025       Electronically signed by Nikolai Vick MD at 2/16/2017 12:25 AM      Marifer Hudson RN at 2/15/2017  5:10 AM          Pt had large dark red vomiting episode on the floor. Order obtained from Dr. Mccormick for zofran 4 mg iv.     Marifer Hudson RN  02/15/17 0520       Electronically signed by Marifer Hudson RN at 2/15/2017  5:20 AM        Vital Signs (last 24 hours)       02/15 0700  -  02/16 0659 02/16 0700  -  02/16 1045   Most Recent    Temp (°F) 97.8 -  98.9      98.3     98.3 (36.8)    Heart Rate 101 -  (!)126       103    Resp 16 -  21       21    BP 98/67 -  168/96       168/92    SpO2 (%) 92 -  100       99          Lines, Drains & Airways    Active LDAs     Name:   Placement date:   Placement time:   Site:   Days:    Peripheral IV Line - Single Lumen 02/15/17 0259 median cubital vein (antecubital fossa), left 20 gauge;1 in length  02/15/17    0259      1    Peripheral IV Line - Single Lumen 02/15/17 0330 cephalic vein (lateral side of arm), right 20 gauge;1 in length  02/15/17    0330      1         Inactive LDAs     None                Hospital Medications (all)       Dose Frequency Start End    ALPRAZolam (XANAX) tablet 0.5 mg 0.5 mg Nightly PRN 2/16/2017     Sig - Route: Take 1 tablet by mouth At Night As Needed for anxiety. - Oral    cefTRIAXone (ROCEPHIN) IVPB 2 g 2 g Every 24 Hours 2/15/2017     Sig - Route: Infuse 50 mL into a venous catheter Daily. - Intravenous    cholecalciferol (VITAMIN D3) tablet 1,000 Units 1,000 Units Daily 2/16/2017     Sig - Route: Take 1 tablet by mouth Daily. - Oral    cyclobenzaprine (FLEXERIL) tablet 5 mg 5 mg 2 Times Daily PRN 2/16/2017     Sig - Route: Take 1 tablet by mouth 2 (Two) Times a Day As Needed for muscle spasms. - Oral    dextrose (D50W) solution 25 g 25 g Every 15 Minutes PRN 2/15/2017     Sig - Route: Infuse 50 mL into a venous catheter Every 15 (Fifteen) Minutes As Needed for low blood sugar (Blood Sugar Less Than 70, Patient Has IV Access -  Unresponsive, NPO or Unable To Safely Swallow). - Intravenous    dextrose (GLUTOSE) oral gel 15 g 15 g Every 15 Minutes PRN 2/15/2017     Sig - Route: Take 15 g by mouth Every 15 (Fifteen) Minutes As Needed for low blood sugar (Blood Sugar Less Than 70, Patient Alert, Is Not NPO & Can Safely Swallow). - Oral    diphenhydrAMINE (BENADRYL) capsule 25 mg 25 mg Every 6 Hours PRN 2/16/2017     Sig - Route: Take 1 capsule by mouth Every 6 (Six) Hours As Needed for allergies. - Oral    docusate sodium (COLACE) capsule 100 mg 100 mg As Needed 2/16/2017     Sig - Route: Take 1 capsule by mouth As Needed for constipation. - Oral    DULoxetine (CYMBALTA) DR capsule 90 mg 90 mg Daily 2/16/2017     Sig - Route: Take 90 mg by mouth Daily. - Oral    ferrous sulfate tablet 325 mg 325 mg 2 Times Daily With Meals 2/16/2017     Sig - Route: Take 1 tablet by mouth 2 (Two) Times a Day With Meals. - Oral    folic acid (FOLVITE) tablet 1 mg 1 mg Daily 2/16/2017     Sig - Route: Take 1 tablet by mouth Daily. - Oral    glucagon (human recombinant) (GLUCAGEN DIAGNOSTIC) injection 1 mg 1 mg Every 15 Minutes PRN 2/15/2017     Sig - Route: Inject 1 mg under the skin Every 15 (Fifteen) Minutes As Needed (Blood Glucose Less Than 70 - Patient Without IV Access - Unresponsive, NPO or Unable To Safely Swallow). - Subcutaneous    insulin aspart (novoLOG) injection 0-24 Units 0-24 Units Every 4 Hours 2/15/2017     Sig - Route: Inject 0-24 Units under the skin Every 4 (Four) Hours. - Subcutaneous    insulin detemir (LEVEMIR) injection 30 Units 30 Units Nightly 2/16/2017     Sig - Route: Inject 30 Units under the skin Every Night. - Subcutaneous    levETIRAcetam (KEPPRA) tablet 500 mg 500 mg Every 12 Hours Scheduled 2/16/2017     Sig - Route: Take 1 tablet by mouth Every 12 (Twelve) Hours. - Oral    metoclopramide (REGLAN) tablet 5 mg 5 mg 4 Times Daily Before Meals & Nightly 2/16/2017     Sig - Route: Take 1 tablet by mouth 4 (Four) Times a Day Before  Meals & at Bedtime. - Oral    morphine injection 2 mg 2 mg Every 4 Hours PRN 2/15/2017 2/25/2017    Sig - Route: Infuse 1 mL into a venous catheter Every 4 (Four) Hours As Needed for moderate pain (4-6) or severe pain (7-10). - Intravenous    morphine injection 4 mg 4 mg Every 4 Hours PRN 2/15/2017 2/25/2017    Sig - Route: Infuse 1 mL into a venous catheter Every 4 (Four) Hours As Needed for moderate pain (4-6) or severe pain (7-10). - Intravenous    octreotide (sandoSTATIN) 5 mcg/mL in sodium chloride 0.9 % 100 mL infusion 25 mcg/hr Continuous 2/15/2017     Sig - Route: Infuse 25 mcg/hr into a venous catheter Continuous. - Intravenous    octreotide (sandoSTATIN) injection 50 mcg 50 mcg Once 2/15/2017 2/15/2017    Sig - Route: Infuse 1 mL into a venous catheter 1 (One) Time. - Intravenous    ondansetron (ZOFRAN) injection 4 mg 4 mg Once 2/15/2017 2/15/2017    Sig - Route: Infuse 2 mL into a venous catheter 1 (One) Time. - Intravenous    ondansetron (ZOFRAN) injection 4 mg 4 mg Once 2/15/2017 2/15/2017    Sig - Route: Infuse 2 mL into a venous catheter 1 (One) Time. - Intravenous    Cosign for Ordering: Accepted by Mike Mccormick MD on 2/15/2017  6:52 AM    pantoprazole (PROTONIX) infusion 40 mg/100 mL 0.9% NS IVPB 8 mg/hr Continuous 2/15/2017     Sig - Route: Infuse 8 mg/hr into a venous catheter Continuous. - Intravenous    pantoprazole (PROTONIX) injection 80 mg 80 mg Once 2/15/2017 2/15/2017    Sig - Route: Infuse 20 mL into a venous catheter 1 (One) Time. - Intravenous    pravastatin (PRAVACHOL) tablet 40 mg 40 mg Daily 2/16/2017     Sig - Route: Take 1 tablet by mouth Daily. - Oral    pregabalin (LYRICA) capsule 75 mg 75 mg Every 12 Hours Scheduled 2/16/2017     Sig - Route: Take 1 capsule by mouth Every 12 (Twelve) Hours. - Oral    rifaximin (XIFAXAN) tablet 550 mg 550 mg Every 12 Hours Scheduled 2/16/2017     Sig - Route: Take 1 tablet by mouth Every 12 (Twelve) Hours. - Oral    sodium chloride 0.9 %  "bolus 1,000 mL 1,000 mL Once 2/15/2017 2/15/2017    Sig - Route: Infuse 1,000 mL into a venous catheter 1 (One) Time. - Intravenous    sodium chloride 0.9 % bolus 2,517 mL 30 mL/kg × 83.9 kg Once 2/15/2017 2/15/2017    Sig - Route: Infuse 2,517 mL into a venous catheter 1 (One) Time. - Intravenous    sodium chloride 0.9 % flush 1-10 mL 1-10 mL As Needed 2/15/2017     Sig - Route: Infuse 1-10 mL into a venous catheter As Needed for line care. - Intravenous    sodium chloride 0.9 % flush 10 mL 10 mL As Needed 2/15/2017     Sig - Route: Infuse 10 mL into a venous catheter As Needed for line care. - Intravenous    Linked Group 1:  \"And\" Linked Group Details        sodium chloride 0.9 % infusion 125 mL/hr Continuous 2/15/2017     Sig - Route: Infuse 125 mL/hr into a venous catheter Continuous. - Intravenous    sucralfate (CARAFATE) tablet 1 g 1 g 2 Times Daily 2/16/2017     Sig - Route: Take 1 tablet by mouth 2 (Two) Times a Day. - Oral    vitamin B-12 (CYANOCOBALAMIN) tablet 1,000 mcg 1,000 mcg Daily 2/16/2017     Sig - Route: Take 2 tablets by mouth Daily. - Oral    insulin detemir (LEVEMIR) injection 30 Units (Discontinued) 30 Units Nightly 2/16/2017 2/16/2017    Sig - Route: Inject 30 Units under the skin Every Night. - Subcutaneous    lactated ringers infusion (Discontinued) 30 mL/hr Continuous 2/15/2017 2/15/2017    Sig - Route: Infuse 30 mL/hr into a venous catheter Continuous. - Intravenous             Physician Progress Notes (last 24 hours) (Notes from 2/15/2017 10:45 AM through 2/16/2017 10:45 AM)      Rich Coleman MD at 2/15/2017  2:21 PM  Version 1 of 1         Brief procedure note:    Procedure: EGD    Pre-op diagnosis: GI bleed    Post-op diagnosis: Esophageal varices, banded. Duodenitis.     Recommendations: Octreotide/Protonix.     Please refer to the Provation-generated note for photos and further details.     Rich Coleman MD       Electronically signed by Rich Coleman MD at 2/15/2017  " 2:22 PM           Plan of Care  Date of Service: 2/15/2017 7:54 PM  Lois Peace RN   Neurology      Problem: Patient Care Overview (Adult)  Goal: Plan of Care Review  Outcome: Ongoing (interventions implemented as appropriate)     02/15/17 1948   Coping/Psychosocial Response Interventions   Plan Of Care Reviewed With patient;spouse   Patient Care Overview   Progress improving   Outcome Evaluation   Outcome Summary/Follow up Plan Patient admitted from ER after vomiting large amount of BRB. She received 2 units PRBC by noon. Intermittant nausea but no emesis. EGD by Dr Coleman at 2pm found varices (which were banded) but no active bleeding. Pt had black melena stools approximately every hour throughout shift. H&H and vitals remained stable/improved. Pt remains on octreotide and protonix drips. Diet was advanced to full liquids after EDG; pt only drank diet ginger ale by end of shift and tolerated it ok. Pt's  said it was ok to give updates to a sister in [place] who may call. (Both have the access code.)           Problem: Gastrointestinal Bleeding (Adult)  Goal: Signs and Symptoms of Listed Potential Problems Will be Absent or Manageable (Gastrointestinal Bleeding)  Outcome: Ongoing (interventions implemented as appropriate)     Problem: Fall Risk (Adult)  Goal: Identify Related Risk Factors and Signs and Symptoms  Outcome: Outcome(s) achieved Date Met:  02/15/17  Goal: Absence of Falls  Outcome: Ongoing (interventions implemented as appropriate)

## 2017-02-16 NOTE — PLAN OF CARE
Problem: Patient Care Overview (Adult)  Goal: Plan of Care Review    02/16/17 1408   Coping/Psychosocial Response Interventions   Plan Of Care Reviewed With patient   Outcome Evaluation   Outcome Summary/Follow up Plan Pt presents w decreased strength and impaired balance. Pt is at increased fall risk w limited independence w functional mobility. Due to the above skilled PT is indicated at this time.         Problem: Inpatient Physical Therapy  Goal: Bed Mobility Goal LTG- PT    02/16/17 1408   Bed Mobility PT LTG   Bed Mobility PT LTG, Date Established 02/16/17   Bed Mobility PT LTG, Time to Achieve 3 days   Bed Mobility PT LTG, Activity Type supine to sit/sit to supine   Bed Mobility PT LTG, [place] Level independent       Goal: Transfer Training Goal 1 LTG- PT    02/16/17 1408   Transfer Training PT LTG   Transfer Training PT LTG, Date Established 02/16/17   Transfer Training PT LTG, Time to Achieve 3 days   Transfer Training PT LTG, Activity Type sit to stand/stand to sit   Transfer Training PT LTG, [place] Level independent       Goal: Gait Training Goal LTG- PT    02/16/17 1408   Gait Training PT LTG   Gait Training Goal PT LTG, Date Established 02/16/17   Gait Training Goal PT LTG, Time to Achieve 3 days   Gait Training Goal PT LTG, [place] Level supervision required   Gait Training Goal PT LTG, Distance to Achieve 300

## 2017-02-16 NOTE — PLAN OF CARE
Problem: Patient Care Overview (Adult)  Goal: Plan of Care Review  Outcome: Ongoing (interventions implemented as appropriate)    02/16/17 2172   Coping/Psychosocial Response Interventions   Plan Of Care Reviewed With patient   Patient Care Overview   Progress improving   Outcome Evaluation   Outcome Summary/Follow up Plan pt admit to rm 609 transfer from ICU , pt had esophegeal Varices on Egd ( with bands placed) , blood administered 2 units yesterday , hgb remains stable today and no vomiting blood, just dark black stools cont, Protonix IV drip changed to Oral pills, pt also on NS @ 125/hr and octreatide at 5ml/hr , pt had PT eval today , up with assist to BSC , unsteady and some weakness present, pt also on ROOM air, and Full Liq diet        Goal: Discharge Needs Assessment  Outcome: Ongoing (interventions implemented as appropriate)    Problem: Gastrointestinal Bleeding (Adult)  Goal: Signs and Symptoms of Listed Potential Problems Will be Absent or Manageable (Gastrointestinal Bleeding)  Outcome: Ongoing (interventions implemented as appropriate)    Problem: Fall Risk (Adult)  Goal: Absence of Falls  Outcome: Ongoing (interventions implemented as appropriate)

## 2017-02-16 NOTE — PROGRESS NOTES
Acute Care - Physical Therapy Initial Evaluation  [facility]     Patient Name: Silvia Zabala  : 1960  MRN: [id number]  Today's Date: 2017   Onset of Illness/Injury or Date of Surgery Date: (P) 02/15/17  Date of Referral to PT: (P) 17  Referring Physician: Coleman (P)      Admit Date: 2/15/2017     Visit Dx:    ICD-10-CM ICD-9-CM   1. UGI bleed K92.2 578.9   2. 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   • Cirrhosis    • Colon polyps    • Deafness    • Depression    • Diabetes mellitus      Adult onset, insulin requiring   • Duodenal ulcer with hemorrhage    • Fibromyalgia    • Gallbladder disease    • Gastroparesis    • Glaucoma    • Granuloma annulare    • H/O B12 deficiency    • H/O Bleeding ulcer      And gastroesophageal varices   • H/O mixed connective tissue disorder    • Hemorrhoids    • Hiatal hernia    • Hx of being hospitalized      In [place] for GI bleeding from ulcer and varices   • Hyperlipidemia    • Migraine    • BRICE (obstructive sleep apnea)    • Pancreas divisum    • Pancytopenia      Chronic, due to cirrhosis and hypersplenism   • Peptic ulcer disease      And esophageal varices   • PONV (postoperative nausea and vomiting)    • RA (rheumatoid arthritis)    • Seizure disorder    • Seizures    • Systemic lupus      Past Surgical  "History   Procedure Laterality Date   • Cholecystectomy     • Stomach surgery     • Hysterectomy     • Eye surgery     • Knee surgery Right 1995     \"right knee recontstruction\"   • Endoscopy and colonoscopy  2014     Dr. Rich Coleman with findings of candida esophagitis   • Liver biopsy  01/2015   • Endoscopy N/A 11/7/2016     Procedure: ESOPHAGOGASTRODUODENOSCOPY WITH COLD POLYPECTOMY, BANDING,  AND CLIPS TIMES 2;  Surgeon: Rich Coleman MD;  Location: [facility] ENDOSCOPY;  Service:    • Endoscopy N/A 12/22/2016     Procedure: ESOPHAGOGASTRODUODENOSCOPY WITH ESOPHAGEAL BANDING. 5 BANDS FIRED, 4 BANDS ADHERERD TO MUCOSA;  Surgeon: Rich Coleman MD;  Location: [facility] ENDOSCOPY;  Service:    • Endoscopy N/A 2/15/2017     Procedure: ESOPHAGOGASTRODUODENOSCOPY AT BEDSIDE with esophageal varices banding (3);  Surgeon: Rich Coleman MD;  Location: [facility] ENDOSCOPY;  Service:           PT ASSESSMENT (last 72 hours)      PT Evaluation       02/16/17 1359 02/15/17 0600    Rehab Evaluation    Document Type (P)  evaluation  -MS     Subjective Information (P)  agree to therapy  -MS     Patient Effort, Rehab Treatment (P)  good  -MS     Symptoms Noted During/After Treatment (P)  none  -MS     General Information    Patient Profile Review (P)  yes  -MS     Onset of Illness/Injury or Date of Surgery Date (P)  02/15/17  -MS     Referring Physician (P)  [place]  -MS     General Observations (P)  Pt supine in bed w no acute signs of distress  -MS     Precautions/Limitations (P)  fall precautions  -MS     Prior Level of Function (P)  independent:  -MS     Equipment Currently Used at Home (P)  none  -MS none  -EM    Living Environment    Lives With (P)  spouse  -MS spouse  -EM    Living Arrangements (P)  house  -MS house  -EM    Home Accessibility (P)  no concerns  -MS no concerns  -EM    Stair Railings at Home (P)  none  -MS none  -EM    Type of Financial/Environmental Concern  none  -EM    Transportation Available  none  " -EM    Clinical Impression    Date of Referral to PT (P)  02/16/17  -MS     PT Diagnosis (P)  Impaired balance w limited endurance  -MS     Prognosis (P)  Good  -MS     Functional Level At Time Of Evaluation (P)  CGA  -MS     Criteria for Skilled Therapeutic Interventions Met (P)  yes;treatment indicated  -MS     Pathology/Pathophysiology Noted (Describe Specifically for Each System) (P)  musculoskeletal  -MS     Impairments Found (describe specific impairments) (P)  aerobic capacity/endurance;gait, locomotion, and balance;ROM  -MS     Functional Limitations in Following Categories (Describe Specific Limitations) (P)  self-care;community/leisure;home management  -MS     Disability: Inability to Perform Actions/Activities of Required Roles (describe specific disability) (P)  community/leisure  -MS     Rehab Potential (P)  good, to achieve stated therapy goals  -MS     Predicted Duration of Therapy Intervention (days/wks) (P)  3 days  -MS     Pain Assessment    Pain Assessment (P)  0-10  -MS     Pain Score (P)  7  -MS     Pain Location (P)  Abdomen  -MS     Pain Intervention(s) (P)  Repositioned;Ambulation/increased activity  -MS     Cognitive Assessment/Intervention    Current Cognitive/Communication Assessment (P)  functional  -MS     Orientation Status (P)  oriented x 4  -MS     Follows Commands/Answers Questions (P)  100% of the time  -MS     Personal Safety (P)  WNL/WFL  -MS     Personal Safety Interventions (P)  fall prevention program maintained;gait belt;nonskid shoes/slippers when out of bed  -MS     ROM (Range of Motion)    General ROM (P)  upper extremity range of motion deficits identified;lower extremity range of motion deficits identified  -MS     General UE Assessment    ROM (P)  LUE ROM was WFL;RUE ROM was WFL  -MS     General LE Assessment    ROM (P)  LLE ROM was WFL;RLE ROM was WFL  -MS     MMT (Manual Muscle Testing)    General MMT Assessment (P)  upper extremity strength deficits identified;lower  extremity strength deficits identified  -MS     Upper Extremity    Upper Ext Manual Muscle Testing (P)  left UE strength is WFL;right UE strength is WFL  -MS     Lower Extremity    Lower Ext Manual Muscle Testing (P)  left LE strength is WFL;right LE strength is WFL  -MS     Bed Mobility, Assessment/Treatment    Bed Mobility, Assistive Device (P)  head of bed elevated  -MS     Bed Mobility, Scoot/Bridge, [place] (P)  supervision required  -MS     Bed Mob, Supine to Sit, [place] (P)  contact guard assist  -MS     Bed Mob, Sit to Supine, [place] (P)  supervision required  -MS     Bed Mobility, Safety Issues (P)  decreased use of arms for pushing/pulling  -MS     Bed Mobility, Impairments (P)  strength decreased  -MS     Transfer Assessment/Treatment    Transfers, Sit-Stand [place] (P)  contact guard assist;verbal cues required  -MS     Transfers, Stand-Sit [place] (P)  supervision required  -MS     Transfer, Safety Issues (P)  step length decreased;weight-shifting ability decreased  -MS     Transfer, Impairments (P)  strength decreased;impaired balance  -MS     Gait Assessment/Treatment    Gait, [place] Level (P)  contact guard assist  -MS     Gait, Distance (Feet) (P)  184  -MS     Gait, Gait Deviations (P)  [place] decreased;bilateral:;step length decreased;stride length decreased  -MS     Gait, Safety Issues (P)  step length decreased;weight-shifting ability decreased  -MS     Gait, Impairments (P)  strength decreased;impaired balance  -MS     Stairs Assessment/Treatment    Stairs, [place] Level (P)  not tested  -MS     Positioning and Restraints    Pre-Treatment Position (P)  in bed  -MS     Post Treatment Position (P)  bed  -MS     In Bed (P)  supine;call light within reach;encouraged to call for assist  -MS       User Key  (r) = Recorded By, (t) = Taken By, (c) = Cosigned By    Initials Name Provider Type    PINEDA Long, RN Registered Nurse    MS Meenu Sebastian, PT Student  PT Student          Physical Therapy Education     Title: PT OT SLP Therapies (Done)     Topic: Physical Therapy (Done)     Point: Precautions (Done)    Learning Progress Summary    Learner Readiness Method Response Comment Documented by Status   Patient Acceptance PALMER DELACRUZ  MS 02/16/17 1408 Done                      User Key     Initials Effective Dates Name Provider Type Discipline    MS 01/09/17 -  Meenu Sebastian, PT Student PT Student PT                PT Recommendation and Plan  Anticipated Discharge Disposition: (P) home with assist  Planned Therapy Interventions: (P) bed mobility training, transfer training, gait training  PT Frequency: (P) daily  Plan of Care Review  Plan Of Care Reviewed With: (P) patient  Outcome Summary/Follow up Plan: (P) Pt presents w decreased strength and impaired balance. Pt is at increased fall risk w limited independence w functional mobility. Due to the above skilled PT is indicated at this time.          IP PT Goals       02/16/17 1408          Bed Mobility PT LTG    Bed Mobility PT LTG, Date Established (P)  02/16/17  -MS      Bed Mobility PT LTG, Time to Achieve (P)  3 days  -MS      Bed Mobility PT LTG, Activity Type (P)  supine to sit/sit to supine  -MS      Bed Mobility PT LTG, [place] Level (P)  independent  -MS      Transfer Training PT LTG    Transfer Training PT LTG, Date Established (P)  02/16/17  -MS      Transfer Training PT LTG, Time to Achieve (P)  3 days  -MS      Transfer Training PT LTG, Activity Type (P)  sit to stand/stand to sit  -MS      Transfer Training PT LTG, [place] Level (P)  independent  -MS      Gait Training PT LTG    Gait Training Goal PT LTG, Date Established (P)  02/16/17  -MS      Gait Training Goal PT LTG, Time to Achieve (P)  3 days  -MS      Gait Training Goal PT LTG, [place] Level (P)  supervision required  -MS      Gait Training Goal PT LTG, Distance to Achieve (P)  300  -MS        User Key  (r) = Recorded By, (t) = Taken By, (c)  = Cosigned By    Initials Name Provider Type    MS Corrigan Jaz PT Student PT Student                Outcome Measures       02/16/17 1400          How much help from another person do you currently need...    Turning from your back to your side while in flat bed without using bedrails? (P)  4  -MS      Moving from lying on back to sitting on the side of a flat bed without bedrails? (P)  3  -MS      Moving to and from a bed to a chair (including a wheelchair)? (P)  3  -MS      Standing up from a chair using your arms (e.g., wheelchair, bedside chair)? (P)  3  -MS      Climbing 3-5 steps with a railing? (P)  3  -MS      To walk in hospital room? (P)  3  -MS      AM-PAC 6 Clicks Score (P)  19  -MS      Functional Assessment    Outcome Measure Options (P)  AM-PAC 6 Clicks Basic Mobility (PT)  -MS        User Key  (r) = Recorded By, (t) = Taken By, (c) = Cosigned By    Initials Name Provider Type    MS Corrigan Jaz PT Student PT Student           Time Calculation:         PT Charges       02/16/17 1411          Time Calculation    Start Time (P)  1339  -MS      Stop Time (P)  1356  -MS      Time Calculation (min) (P)  17 min  -MS      PT Received On (P)  02/16/17  -MS      PT - Next Appointment (P)  02/17/17  -MS      PT Goal Re-Cert Due Date (P)  02/19/17  -MS        User Key  (r) = Recorded By, (t) = Taken By, (c) = Cosigned By    Initials Name Provider Type    MS Meenu Jaz PT Student PT Student          Therapy Charges for Today     Code Description Service Date Service Provider Modifiers Qty    25092865518 HC PT EVAL LOW COMPLEXITY 1 2/16/2017 Meenu Sebastian PT Student GP 1    95779652559 HC PT THER SUPP EA 15 MIN 2/16/2017 Meenu Sebastian PT Student GP 1    74283081641 HC PT THER PROC EA 15 MIN 2/16/2017 Meenu Sebastian PT Student GP 1          PT G-Codes  Outcome Measure Options: (P) AM-PAC 6 Clicks Basic Mobility (PT)      Meenu Sebastian PT Student  2/16/2017

## 2017-02-16 NOTE — PROGRESS NOTES
"  PROGRESS NOTE   LOS: 1 day   Patient Care Team:  Blanca Pitts MD as PCP - General (Internal Medicine)  Sukhdeep Mcdowell MD as Consulting Physician (Hematology and Oncology)    Chief Complaint: Upper GI bleed.    Interval History: History was non-alcoholic steatohepatitis was individual that he see his came in with upper GI bleed.  She did drop 2 g on her hemoglobin she was scoped and she had banding of the varices was no complication and no active bleeding at the time.  The patient is feeling better no hypertension, mild tachycardia.  Still on 2 L nasal cannula oxygen which should be weaned.  She had no fever or chills.  She is on Rocephin per GI for esophageal variceal bleed which hopefully can be discontinued.  She is having still dark bowel movement and this is expected to last for at least a day or so after the bleeding is controlled.    REVIEW OF SYSTEMS:   Not much abdominal pain no more nausea or vomiting.    She is on 2 L nasal cannula but no shortness of breath with excellent oxygenation.  No palpitation with mild tachycardia.  No rashes no external bleeding.  Still having some melenic stool.      Vital Signs  Temp:  [98.3 °F (36.8 °C)-98.7 °F (37.1 °C)] 98.3 °F (36.8 °C)  Heart Rate:  [101-117] 103  Resp:  [18-21] 21  BP: ()/() 168/92  SpO2:  [92 %-100 %] 99 %  on  Flow (L/min):  [2] 2 O2 Device: room air    Intake/Output Summary (Last 24 hours) at 02/16/17 1144  Last data filed at 02/16/17 1140   Gross per 24 hour   Intake   4202 ml   Output   1175 ml   Net   3027 ml     Flowsheet Rows         First Filed Value    Admission Height  67\" (170.2 cm) Documented at 02/15/2017 0220    Admission Weight  185 lb (83.9 kg) Documented at 02/15/2017 0220          Physical Exam:  GEN:  No acute distress, alert, cooperative, well developed   EYES:   Sclera clear. No icterus. PERRL. Normal EOM  ENT:   External ears/nose normal, no oral lesions, no thrush, mucous membranes moist  NECK:  Supple, midline " trachea, no JVD  LUNGS: Normal chest on inspection, CTAB, no wheezes. No rhonchi. No crackles. Respirations regular, even and unlabored.   CV:  Regular rhythm and rate. Normal S1/S2. No murmurs, gallops, or rubs noted.  ABD:  Soft, non-tender and non-distended. Normal bowel sounds. No guarding  EXT:  Moves all extremities well. No cyanosis. No redness. No edema.   Skin: dry, intact, no bleeding, no jaundice    Results Review:        Results from last 7 days  Lab Units 02/15/17  1255 02/15/17  0256   SODIUM mmol/L  --  135*   POTASSIUM mmol/L 4.4 5.3*   CHLORIDE mmol/L  --  96*   TOTAL CO2 mmol/L  --  21.6*   BUN mg/dL  --  36*   CREATININE mg/dL  --  0.66   CALCIUM mg/dL  --  9.1   AST (SGOT) U/L  --  29   ALT (SGPT) U/L  --  36*       Results from last 7 days  Lab Units 02/16/17  0648   CK TOTAL U/L 83   TROPONIN T ng/mL <0.010       Results from last 7 days  Lab Units 02/16/17  0650 02/16/17  0014 02/15/17  1752 02/15/17  1254 02/15/17  0256   WBC 10*3/mm3  --   --   --   --  13.79*   HEMOGLOBIN g/dL 9.6* 9.7* 11.3* 10.7* 10.7*   HEMATOCRIT % 28.2* 28.8* 33.7* 31.7* 32.8*   PLATELETS 10*3/mm3  --   --   --   --  199   NEUTROPHIL % %  --   --   --   --  73.8   LYMPHOCYTE % %  --   --   --   --  17.0*   MONOCYTES % %  --   --   --   --  7.5   EOSINOPHIL % %  --   --   --   --  0.8   BASOPHIL % %  --   --   --   --  0.3   IMM GRAN % %  --   --   --   --  0.6*       Results from last 7 days  Lab Units 02/15/17  0256   INR  1.21*                     GLUCOSE   Date/Time Value Ref Range Status   02/16/2017 0725 248 (H) 70 - 130 mg/dL Final   02/16/2017 0657 265 (H) 70 - 130 mg/dL Final   02/15/2017 1750 136 (H) 70 - 130 mg/dL Final   02/15/2017 1239 275 (H) 70 - 130 mg/dL Final   02/15/2017 1109 304 (H) 70 - 130 mg/dL Final   02/15/2017 1108 312 (H) 70 - 130 mg/dL Final   02/15/2017 0931 329 (H) 70 - 130 mg/dL Final   02/15/2017 0538 292 (H) 70 - 130 mg/dL Final                                       Medication Review:      cholecalciferol 1,000 Units Oral Daily   DULoxetine 90 mg Oral Daily   ferrous sulfate 325 mg Oral BID With Meals   folic acid 1 mg Oral Daily   insulin aspart 0-24 Units Subcutaneous Q4H   insulin detemir 30 Units Subcutaneous Nightly   levETIRAcetam 500 mg Oral Q12H   metoclopramide 5 mg Oral 4x Daily AC & at Bedtime   pravastatin 40 mg Oral Daily   pregabalin 75 mg Oral Q12H   rifaximin 550 mg Oral Q12H   sucralfate 1 g Oral BID   vitamin B-12 1,000 mcg Oral Daily         octreotide (SandoSTATIN) infusion 25 mcg/hr Last Rate: 25 mcg/hr (02/16/17 1140)   pantoprazole 8 mg/hr Last Rate: 8 mg/hr (02/15/17 2045)   sodium chloride 125 mL/hr Last Rate: 125 mL/hr (02/16/17 0822)       ASSESSMENT:   None alcoholic steatohepatitis with esophageal varices and upper GI bleed  Diabetes mellitus on insulin with hyperglycemia  Rheumatoid arthritis  Systemic lupus erythematosus  Suspected interstitial lung disease due to the above  Acute blood loss anemia  PLAN:  Will discuss with the GI team regarding the need for continuous Rocephin since she did not have any active esophageal bleeding  Chest x-ray from November 2016 with reported negative for any pulmonary process or fibrosis and chest x-ray film from January 2016 were done here at [facility] and were independently reviewed and there were also negative for any interstitial process  Resume home medication regimen including rifaximin, insulin, Keppra, antidepressant.  Patient is okay to transfer out of the ICU from my standpoint will verify with GI and transferred to a monitor bed  Overall hemodynamically stable status post successful banding of his review of varices no sign of any active ongoing bleeding      Kenia Dimas MD  02/16/17  11:44 AM          EMR Dragon/Transcription disclaimer:   Much of this encounter note is an electronic transcription/translation of spoken language to printed text. The electronic translation of spoken language may permit erroneous, or at  times, nonsensical words or phrases to be inadvertently transcribed; Although I have reviewed the note for such errors, some may still exist.

## 2017-02-16 NOTE — PROGRESS NOTES
[facility]     Inpatient Follow Up Note    Patient Identification:  Name: Silvia Zabala  Age: 56 y.o.  Sex: female  :  1960  MRN: [id number]    Information from:patient    Chief Complaint   Patient presents with   • Vomiting Blood       History:   No more nausea or vomiting.  No further melena today.  Overall feeling much better.    Review of Systems:  Constitutional:  Negative   Cardiovascular:  Negative   Respiratory:  Negative             Problem List:  Patient Active Problem List    Diagnosis   • UGI bleed [K92.2]   • Generalized abdominal pain [R10.84]   • Hyperlipidemia [E78.5]   • Nausea [R11.0]   • DUKES (nonalcoholic steatohepatitis) [K75.81]   • Pancytopenia [D61.818]   • Hypersplenism [D73.1]   • Type 2 diabetes mellitus, uncontrolled, with neuropathy [E11.40, E11.65]   • Fibrositis [M79.7]   • Change in blood platelet count [SGO0766]   • Cirrhosis of liver [K74.60]   • Hepatic encephalopathy [K72.90]   • Rheumatoid arthritis [M06.9]   • Anxiety and depression [F41.9, F32.9]     Current Meds:  MAR Reviewed  Scheduled Meds:  cholecalciferol 1,000 Units Oral Daily   cycloSPORINE 1 drop Both Eyes BID   DULoxetine 90 mg Oral Q PM   ferrous sulfate 325 mg Oral BID With Meals   folic acid 1 mg Oral Q PM   insulin aspart 0-9 Units Subcutaneous 4x Daily AC & at Bedtime   insulin detemir 30 Units Subcutaneous Nightly   levETIRAcetam 500 mg Oral Q12H   metoclopramide 5 mg Oral 4x Daily AC & at Bedtime   pravastatin 40 mg Oral Nightly   pregabalin 75 mg Oral Q12H   rifaximin 550 mg Oral Q12H   sucralfate 1 g Oral BID   vitamin B-12 1,000 mcg Oral Q PM     Continuous Infusions:  octreotide (SandoSTATIN) infusion 25 mcg/hr Last Rate: 25 mcg/hr (17 1140)   pantoprazole 8 mg/hr Last Rate: 8 mg/hr (02/15/17 2045)   sodium chloride 125 mL/hr Last Rate: 125 mL/hr (17 0822)     PRN Meds:.•  ALPRAZolam  •  cyclobenzaprine  •  dextrose  •  dextrose  •  diphenhydrAMINE  •   "docusate sodium  •  glucagon (human recombinant)  •  Morphine  •  Morphine  •  Insert peripheral IV **AND** sodium chloride  Allergies:  Allergies   Allergen Reactions   • Albuterol Anaphylaxis   • Quinine Derivatives Nausea And Vomiting   • Tramadol        Intake/Output:     Intake/Output Summary (Last 24 hours) at 17 1357  Last data filed at 17 1140   Gross per 24 hour   Intake   3202 ml   Output    575 ml   Net   2627 ml     New allergies/reactions:  None    Physical Exam:  Vitals:   Temp (24hrs), Av.4 °F (36.9 °C), Min:98.3 °F (36.8 °C), Max:98.7 °F (37.1 °C)    Temp:  [98.3 °F (36.8 °C)-98.7 °F (37.1 °C)] 98.3 °F (36.8 °C)  Heart Rate:  [] 98  Resp:  [18-21] 21  BP: ()/() 156/89  Visit Vitals   • /89   • Pulse 98   • Temp 98.3 °F (36.8 °C)   • Resp 21   • Ht 67\" (170.2 cm)   • Wt 179 lb 10.8 oz (81.5 kg)   • LMP Comment: hysterectomy   • SpO2 96%   • BMI 28.14 kg/m2       Exam:  NAD  PERRLA. Sclerae and conjunctivae pale  HENT: external inspection normal. Hearing intact.  No respiratory distress. Lungs clear.  Cardiac: no MRG.  Alert, oriented, normal affect.         DATA:  Radiology and Labs:   Recent Results (from the past 24 hour(s))   POC Glucose Fingerstick    Collection Time: 02/15/17  5:50 PM   Result Value Ref Range    Glucose 136 (H) 70 - 130 mg/dL   Hemoglobin & Hematocrit, Blood    Collection Time: 02/15/17  5:52 PM   Result Value Ref Range    Hemoglobin 11.3 (L) 11.9 - 15.5 g/dL    Hematocrit 33.7 (L) 35.6 - 45.5 %   Hemoglobin & Hematocrit, Blood    Collection Time: 17 12:14 AM   Result Value Ref Range    Hemoglobin 9.7 (L) 11.9 - 15.5 g/dL    Hematocrit 28.8 (L) 35.6 - 45.5 %   Prepare RBC, 2 Units    Collection Time: 17  6:00 AM   Result Value Ref Range    Product Code W6641F18     Unit Number [id number]     UNIT  ABO O     UNIT  RH POS     CROSSMATCH 1 INTERPRETATION Compatible     Dispense Status PT     Blood Type OPOS     Blood " Expiration Date 201703132359     Blood Type Barcode 5100     Product Code J7232J01     Unit Number [id number]     UNIT  ABO O     UNIT  RH POS     CROSSMATCH 1 INTERPRETATION Compatible     Dispense Status PT     Blood Type OPOS     Blood Expiration Date 139260484341     Blood Type Barcode 5100    Troponin    Collection Time: 02/16/17  6:48 AM   Result Value Ref Range    Troponin T <0.010 0.000 - 0.030 ng/mL   CK    Collection Time: 02/16/17  6:48 AM   Result Value Ref Range    Creatine Kinase 83 20 - 180 U/L   CK-MB    Collection Time: 02/16/17  6:48 AM   Result Value Ref Range    CKMB 1.34 <=5.30 ng/mL   Hemoglobin & Hematocrit, Blood    Collection Time: 02/16/17  6:50 AM   Result Value Ref Range    Hemoglobin 9.6 (L) 11.9 - 15.5 g/dL    Hematocrit 28.2 (L) 35.6 - 45.5 %   POC Glucose Fingerstick    Collection Time: 02/16/17  6:57 AM   Result Value Ref Range    Glucose 265 (H) 70 - 130 mg/dL   POC Glucose Fingerstick    Collection Time: 02/16/17  7:25 AM   Result Value Ref Range    Glucose 248 (H) 70 - 130 mg/dL   POC Glucose Fingerstick    Collection Time: 02/16/17 11:52 AM   Result Value Ref Range    Glucose 140 (H) 70 - 130 mg/dL       Assessment:   Problem List:   Active Problems:    UGI bleed    This was variceal in origin.  She appears to be doing well post-banding.    Plan:   It is okay to transfer her out of the intensive care unit but her octreotide drip must be maintained.  Her Protonix drip may be discontinued and replaced with oral PPI.  I would like for her to be up and about his much as possible with assistance.  I will not advance diet until tomorrow.      Rich Coleman MD  [facility]  2/16/2017

## 2017-02-17 LAB
GLUCOSE BLDC GLUCOMTR-MCNC: 190 MG/DL (ref 70–130)
GLUCOSE BLDC GLUCOMTR-MCNC: 248 MG/DL (ref 70–130)
GLUCOSE BLDC GLUCOMTR-MCNC: 291 MG/DL (ref 70–130)
GLUCOSE BLDC GLUCOMTR-MCNC: 300 MG/DL (ref 70–130)
HCT VFR BLD AUTO: 25.5 % (ref 35.6–45.5)
HCT VFR BLD AUTO: 27.5 % (ref 35.6–45.5)
HGB BLD-MCNC: 8.6 G/DL (ref 11.9–15.5)
HGB BLD-MCNC: 9 G/DL (ref 11.9–15.5)

## 2017-02-17 PROCEDURE — 85018 HEMOGLOBIN: CPT | Performed by: INTERNAL MEDICINE

## 2017-02-17 PROCEDURE — 97110 THERAPEUTIC EXERCISES: CPT

## 2017-02-17 PROCEDURE — 25010000002 ONDANSETRON PER 1 MG: Performed by: INTERNAL MEDICINE

## 2017-02-17 PROCEDURE — 25010000002 OCTREOTIDE PER 25 MCG: Performed by: INTERNAL MEDICINE

## 2017-02-17 PROCEDURE — 63710000001 INSULIN DETEMER PER 5 UNITS: Performed by: INTERNAL MEDICINE

## 2017-02-17 PROCEDURE — 63710000001 INSULIN ASPART PER 5 UNITS: Performed by: INTERNAL MEDICINE

## 2017-02-17 PROCEDURE — 25010000002 MORPHINE PER 10 MG: Performed by: INTERNAL MEDICINE

## 2017-02-17 PROCEDURE — 82962 GLUCOSE BLOOD TEST: CPT

## 2017-02-17 PROCEDURE — 99232 SBSQ HOSP IP/OBS MODERATE 35: CPT | Performed by: INTERNAL MEDICINE

## 2017-02-17 PROCEDURE — 85014 HEMATOCRIT: CPT | Performed by: INTERNAL MEDICINE

## 2017-02-17 RX ADMIN — PREGABALIN 75 MG: 75 CAPSULE ORAL at 08:27

## 2017-02-17 RX ADMIN — MORPHINE SULFATE 2 MG: 2 INJECTION, SOLUTION INTRAMUSCULAR; INTRAVENOUS at 11:24

## 2017-02-17 RX ADMIN — INSULIN ASPART 7 UNITS: 100 INJECTION, SOLUTION INTRAVENOUS; SUBCUTANEOUS at 12:23

## 2017-02-17 RX ADMIN — INSULIN ASPART 6 UNITS: 100 INJECTION, SOLUTION INTRAVENOUS; SUBCUTANEOUS at 01:03

## 2017-02-17 RX ADMIN — PANTOPRAZOLE SODIUM 40 MG: 40 TABLET, DELAYED RELEASE ORAL at 08:27

## 2017-02-17 RX ADMIN — ALPRAZOLAM 0.5 MG: 0.5 TABLET ORAL at 23:13

## 2017-02-17 RX ADMIN — SODIUM CHLORIDE 125 ML/HR: 9 INJECTION, SOLUTION INTRAVENOUS at 08:28

## 2017-02-17 RX ADMIN — PREGABALIN 75 MG: 75 CAPSULE ORAL at 20:36

## 2017-02-17 RX ADMIN — SUCRALFATE 1 G: 1 TABLET ORAL at 08:27

## 2017-02-17 RX ADMIN — OCTREOTIDE ACETATE 25 MCG/HR: 500 INJECTION, SOLUTION INTRAVENOUS; SUBCUTANEOUS at 18:17

## 2017-02-17 RX ADMIN — MORPHINE SULFATE 2 MG: 2 INJECTION, SOLUTION INTRAMUSCULAR; INTRAVENOUS at 23:13

## 2017-02-17 RX ADMIN — METOCLOPRAMIDE 5 MG: 5 TABLET ORAL at 12:23

## 2017-02-17 RX ADMIN — PRAVASTATIN SODIUM 40 MG: 40 TABLET ORAL at 20:36

## 2017-02-17 RX ADMIN — CYCLOSPORINE 1 DROP: 0.5 EMULSION OPHTHALMIC at 08:27

## 2017-02-17 RX ADMIN — PANTOPRAZOLE SODIUM 40 MG: 40 TABLET, DELAYED RELEASE ORAL at 18:19

## 2017-02-17 RX ADMIN — INSULIN DETEMIR 30 UNITS: 100 INJECTION, SOLUTION SUBCUTANEOUS at 23:14

## 2017-02-17 RX ADMIN — RIFAXIMIN 550 MG: 550 TABLET ORAL at 08:27

## 2017-02-17 RX ADMIN — FOLIC ACID 1 MG: 1 TABLET ORAL at 18:19

## 2017-02-17 RX ADMIN — CYCLOBENZAPRINE HYDROCHLORIDE 5 MG: 5 TABLET, FILM COATED ORAL at 08:27

## 2017-02-17 RX ADMIN — LEVETIRACETAM 500 MG: 500 TABLET, FILM COATED ORAL at 20:35

## 2017-02-17 RX ADMIN — INSULIN ASPART 6 UNITS: 100 INJECTION, SOLUTION INTRAVENOUS; SUBCUTANEOUS at 18:19

## 2017-02-17 RX ADMIN — VITAMIN D, TAB 1000IU (100/BT) 1000 UNITS: 25 TAB at 08:27

## 2017-02-17 RX ADMIN — FERROUS SULFATE TAB 325 MG (65 MG ELEMENTAL FE) 325 MG: 325 (65 FE) TAB at 08:27

## 2017-02-17 RX ADMIN — ONDANSETRON 4 MG: 2 INJECTION INTRAMUSCULAR; INTRAVENOUS at 08:27

## 2017-02-17 RX ADMIN — DULOXETINE HYDROCHLORIDE 90 MG: 60 CAPSULE, DELAYED RELEASE ORAL at 18:19

## 2017-02-17 RX ADMIN — INSULIN ASPART 2 UNITS: 100 INJECTION, SOLUTION INTRAVENOUS; SUBCUTANEOUS at 08:27

## 2017-02-17 RX ADMIN — SUCRALFATE 1 G: 1 TABLET ORAL at 18:19

## 2017-02-17 RX ADMIN — RIFAXIMIN 550 MG: 550 TABLET ORAL at 20:35

## 2017-02-17 RX ADMIN — SODIUM CHLORIDE 125 ML/HR: 9 INJECTION, SOLUTION INTRAVENOUS at 16:46

## 2017-02-17 RX ADMIN — Medication 1000 MCG: at 18:19

## 2017-02-17 RX ADMIN — FERROUS SULFATE TAB 325 MG (65 MG ELEMENTAL FE) 325 MG: 325 (65 FE) TAB at 18:19

## 2017-02-17 RX ADMIN — INSULIN DETEMIR 30 UNITS: 100 INJECTION, SOLUTION SUBCUTANEOUS at 01:04

## 2017-02-17 RX ADMIN — LEVETIRACETAM 500 MG: 500 TABLET, FILM COATED ORAL at 08:27

## 2017-02-17 RX ADMIN — METOCLOPRAMIDE 5 MG: 5 TABLET ORAL at 08:27

## 2017-02-17 RX ADMIN — INSULIN ASPART 4 UNITS: 100 INJECTION, SOLUTION INTRAVENOUS; SUBCUTANEOUS at 23:13

## 2017-02-17 RX ADMIN — METOCLOPRAMIDE 5 MG: 5 TABLET ORAL at 20:35

## 2017-02-17 RX ADMIN — CYCLOSPORINE 1 DROP: 0.5 EMULSION OPHTHALMIC at 18:19

## 2017-02-17 RX ADMIN — METOCLOPRAMIDE 5 MG: 5 TABLET ORAL at 18:19

## 2017-02-17 NOTE — PLAN OF CARE
Problem: Inpatient Physical Therapy  Goal: Gait Training Goal LTG- PT  Outcome: Outcome(s) achieved Date Met:  02/17/17 02/17/17 1142   Gait Training PT LTG   Gait Training Goal PT LTG, Outcome goal met

## 2017-02-17 NOTE — THERAPY DISCHARGE NOTE
Acute Care - Physical Therapy Treatment Note/Discharge  [facility]     Patient Name: Silvia Zabala  : 1960  MRN: [id number]  Today's Date: 2017  Onset of Illness/Injury or Date of Surgery Date: 02/15/17  Date of Referral to PT: 17  Referring Physician: [place]    Admit Date: 2/15/2017    Visit Dx:    ICD-10-CM ICD-9-CM   1. UGI bleed K92.2 578.9   2. Secondary esophageal varices with bleeding I85.11 456.20     Patient Active Problem List   Diagnosis   • Cirrhosis of liver   • Hepatic encephalopathy   • Rheumatoid arthritis   • Anxiety and depression   • Type 2 diabetes mellitus, uncontrolled, with neuropathy   • Fibrositis   • Change in blood platelet count   • Pancytopenia   • Hypersplenism   • Nausea   • DUKES (nonalcoholic steatohepatitis)   • Hyperlipidemia   • Generalized abdominal pain   • UGI bleed       Physical Therapy Education     Title: PT OT SLP Therapies (Resolved)     Topic: Physical Therapy (Resolved)     Point: Precautions (Resolved)    Learning Progress Summary    Learner Readiness Method Response Comment Documented by Status   Patient Acceptance E REJIPALMER   17 1143 Done    Acceptance E PALMER MENDOZA  MS 17 1408 Done                      User Key     Initials Effective Dates Name Provider Type Discipline     16 -  Glendy Goodrich, PTA Physical Therapy Assistant PT    MS 17 -  Meenu Sebastian, YOVANNY Student PT Student PT                    IP PT Goals       17 1142 17 1141 17 1408    Bed Mobility PT LTG    Bed Mobility PT LTG, Date Established   17  -MD MS MD Khalif (r t)c)    Bed Mobility PT LTG, Time to Achieve   3 days  -MD MS MIRELLA (r t) (c)    Bed Mobility PT LTG, Activity Type   supine to sit/sit to supine  -MD MS MD Khalif (r t)c)    Bed Mobility PT LTG, [place] Level   independent  -MD MS MD roel (r t))    Bed Mobility PT LTG, Outcome  goal met  -     Transfer Training PT LTG    Transfer Training PT LTG, Date Established   17   -MD (r) MS (t) MD (c)    Transfer Training PT LTG, Time to Achieve   3 days  -MD (r) MS (t) MD (c)    Transfer Training PT LTG, Activity Type   sit to stand/stand to sit  -MD (r) MS (t) MD (c)    Transfer Training PT LTG, [place] Level   independent  -MD (r) MS (t) MD (c)    Transfer Training PT LTG, Outcome goal met  -JW      Gait Training PT LTG    Gait Training Goal PT LTG, Date Established   02/16/17  -MD (r) MS (t) MD (c)    Gait Training Goal PT LTG, Time to Achieve   3 days  -MD (r) MS (t) MD (c)    Gait Training Goal PT LTG, [place] Level   supervision required  -MD (r) MS (t) MD (c)    Gait Training Goal PT LTG, Distance to Achieve   300  -MD (r) MS (t) MD (c)    Gait Training Goal PT LTG, Outcome goal met  -JW        User Key  (r) = Recorded By, (t) = Taken By, (c) = Cosigned By    Initials Name Provider Type    MD Blanca Mclaughlin, PT Physical Therapist     Glendy Goodrich PTA Physical Therapy Assistant    MS Meenu Jaz, PT Student PT Student              Adult Rehabilitation Note       02/17/17 1138          Rehab Assessment/Intervention    Discipline physical therapy assistant  -      Document Type therapy note (daily note)  -      Subjective Information agree to therapy  -      Precautions/Limitations fall precautions  -JW      Recorded by [] Glendy Goodrich PTA      Pain Assessment    Pain Assessment 0-10  -      Pain Score 7  -      Pain Location Abdomen  -JW      Pain Intervention(s) Medication (See MAR)  -JW      Recorded by [] Glendy Goodrich PTA      Cognitive Assessment/Intervention    Current Cognitive/Communication Assessment functional  -      Orientation Status oriented x 4  -JW      Follows Commands/Answers Questions 100% of the time  -      Personal Safety Interventions fall prevention program maintained;gait belt  -JW      Recorded by [LAVINIA] Glendy Goodrich PTA      Bed Mobility, Assessment/Treatment    Bed Mob, Supine to Sit, [place] independent   -JW      Bed Mob, Sit to Supine, [place] independent  -JW      Recorded by [LAVINIA] Glendy Goodrich PTA      Transfer Assessment/Treatment    Transfers, Sit-Stand [place] independent  -JW      Transfers, Stand-Sit [place] independent  -JW      Recorded by [LAVINIA] Glendy Goodrich PTA      Gait Assessment/Treatment    Gait, [place] Level conditional independence   IV  -JW      Gait, Distance (Feet) 300  -JW      Gait, Gait Deviations chica decreased  -JW      Recorded by [LAVINIA] Glendy Goodrich PTA      Positioning and Restraints    Pre-Treatment Position in bed  -JW      Post Treatment Position bed  -JW      In Bed supine;call light within reach;encouraged to call for assist  -JW      Recorded by [LAVINIA] Glendy Goodrich PTA        User Key  (r) = Recorded By, (t) = Taken By, (c) = Cosigned By    Initials Name Effective Dates    LAVINIA Goodrich PTA 02/18/16 -           PT Recommendation and Plan  Anticipated Discharge Disposition: home with assist  Planned Therapy Interventions: bed mobility training, transfer training, gait training  PT Frequency: daily             Outcome Measures       02/17/17 1100 02/16/17 1400       How much help from another person do you currently need...    Turning from your back to your side while in flat bed without using bedrails? 4  -JW 4  -MD (r) MS (t) MD (c)     Moving from lying on back to sitting on the side of a flat bed without bedrails? 4  - 3  -MD (r) MS (t) MD (c)     Moving to and from a bed to a chair (including a wheelchair)? 3  - 3  -MD (r) MS (t) MD (c)     Standing up from a chair using your arms (e.g., wheelchair, bedside chair)? 4  -JW 3  -MD (r) MS (t) MD (c)     Climbing 3-5 steps with a railing? 3  -JW 3  -MD (r) MS (t) MD (c)     To walk in hospital room? 3  -JW 3  -MD (r) MS (t) MD (c)     AM-PAC 6 Clicks Score 21  -JW 19  -MD (r) MS (t)     Functional Assessment    Outcome Measure Options  AM-PAC 6 Clicks Basic Mobility (PT)  -MD beck)  MS (t) MD (c)       User Key  (r) = Recorded By, (t) = Taken By, (c) = Cosigned By    Initials Name Provider Type    MD Blanca Mclaughlin, PT Physical Therapist    LAVINIA Goodrich PTA Physical Therapy Assistant    MS Meenu Sebastian, PT Student PT Student           Time Calculation:         PT Charges       02/17/17 1023          Time Calculation    Start Time 1123  -      Stop Time 1137  -      Time Calculation (min) 14 min  -      PT Received On 02/17/17  -LAVINIA      PT - Next Appointment --  -        User Key  (r) = Recorded By, (t) = Taken By, (c) = Cosigned By    Initials Name Provider Type    LAVINIA Goodrich PTA Physical Therapy Assistant          Therapy Charges for Today     Code Description Service Date Service Provider Modifiers Qty    45567888232 HC PT THER PROC EA 15 MIN 2/17/2017 Glendy Goodrich PTA GP 1          PT G-Codes  Outcome Measure Options: AM-PAC 6 Clicks Basic Mobility (PT)    PT Discharge Summary  Reason for Discharge: All goals achieved  Outcomes Achieved: Able to achieve all goals within established timeline  Discharge Destination: Home with assist    Glendy Goodrich PTA  2/17/2017

## 2017-02-17 NOTE — PLAN OF CARE
Problem: Inpatient Physical Therapy  Goal: Bed Mobility Goal LTG- PT  Outcome: Outcome(s) achieved Date Met:  02/17/17 02/17/17 1141   Bed Mobility PT LTG   Bed Mobility PT LTG, Outcome goal met

## 2017-02-17 NOTE — PROGRESS NOTES
Continued Stay Note  [facility]     Patient Name: Silvia Zabala  MRN: [id number]  Today's Date: 2/17/2017    Admit Date: 2/15/2017          Discharge Plan       02/17/17 1310    Case Management/Social Work Plan    Plan Home    Patient/Family In Agreement With Plan yes    Additional Comments PT has signed off.  Spoke with patient in room and she denies any needs. Advised that CCP will follow as needed.               Discharge Codes     None        Expected Discharge Date and Time     Expected Discharge Date Expected Discharge Time    Feb 16, 2017             Sanjuana Damian RN

## 2017-02-17 NOTE — PROGRESS NOTES
"  PROGRESS NOTE   LOS: 2 days   Patient Care Team:  Blanca Pitts MD as PCP - General (Internal Medicine)  Sukhdeep Mcdowell MD as Consulting Physician (Hematology and Oncology)    Chief Complaint: Upper GI bleed.    Interval History:   Patient was admitted on 2/15/17 with a history of non-alcoholic steatohepatitis  and presented with complaints of colonic stools.  She did drop 2 g on her hemoglobin  and sheshe ended up having an EGD withbanding of the varices  and there was was no complication and no active bleeding at the time.  she was started on Protonix and Sandostatin drip. The patient is feeling better with no hypertension, mild tachycardia,  and improving of dark-colored stool, which is expected to last for at least a day or so after the bleeding is controlled.  She had no fever or chills.  She  received a dose ofRocephin per GI for esophageal variceal bleed. She was initially on 2 L/m of oxygen, but this has been weaned to room air and she denies any further shortness of breath.   hemoglobin has remained stable.      REVIEW OF SYSTEMS:   Not much abdominal pain no more nausea or vomiting. no shortness of breath. no palpitation with mild tachycardia.  No rashes no external bleeding.  Still having some melenic stool that is turning more brown in color.    Vital Signs  Temp:  [98 °F (36.7 °C)-98.2 °F (36.8 °C)] 98.2 °F (36.8 °C)  Heart Rate:  [93-98] 97  Resp:  [16-18] 16  BP: (131-159)/(80-87) 138/80  SpO2:  [95 %-98 %] 97 %  on    O2 Device: room air    Intake/Output Summary (Last 24 hours) at 02/17/17 1442  Last data filed at 02/17/17 0800   Gross per 24 hour   Intake   1316 ml   Output   1000 ml   Net    316 ml     Flowsheet Rows         First Filed Value    Admission Height  67\" (170.2 cm) Documented at 02/15/2017 0220    Admission Weight  185 lb (83.9 kg) Documented at 02/15/2017 0220          Physical Exam:  GEN:  No acute distress, alert, cooperative, well developed   EYES:   Sclera clear. No icterus. " PERRL. Normal EOM  ENT:   External ears/nose normal, no oral lesions, no thrush, mucous membranes moist  NECK:  Supple, midline trachea, no JVD  LUNGS: Normal chest on inspection, CTAB, no wheezes. No rhonchi. No crackles. Respirations regular, even and unlabored.   CV:  Regular rhythm and tachycardic .Normal S1/S2. No murmurs, gallops, or rubs noted.  ABD:  Soft, non-tender and non-distended. Normal bowel sounds. No guarding  EXT:  Moves all extremities well. No cyanosis. No redness. No edema.   Skin: dry, intact, no bleeding, no jaundice    Results Review:        Results from last 7 days  Lab Units 02/15/17  1255 02/15/17  0256   SODIUM mmol/L  --  135*   POTASSIUM mmol/L 4.4 5.3*   CHLORIDE mmol/L  --  96*   TOTAL CO2 mmol/L  --  21.6*   BUN mg/dL  --  36*   CREATININE mg/dL  --  0.66   CALCIUM mg/dL  --  9.1   AST (SGOT) U/L  --  29   ALT (SGPT) U/L  --  36*       Results from last 7 days  Lab Units 02/16/17  0648   CK TOTAL U/L 83   TROPONIN T ng/mL <0.010       Results from last 7 days  Lab Units 02/17/17  0810 02/16/17  1957 02/16/17  0650 02/16/17  0014 02/15/17  1752 02/15/17  1254 02/15/17  0256   WBC 10*3/mm3  --   --   --   --   --   --  13.79*   HEMOGLOBIN g/dL 9.0* 9.2* 9.6* 9.7* 11.3* 10.7* 10.7*   HEMATOCRIT % 27.5* 27.4* 28.2* 28.8* 33.7* 31.7* 32.8*   PLATELETS 10*3/mm3  --   --   --   --   --   --  199   NEUTROPHIL % %  --   --   --   --   --   --  73.8   LYMPHOCYTE % %  --   --   --   --   --   --  17.0*   MONOCYTES % %  --   --   --   --   --   --  7.5   EOSINOPHIL % %  --   --   --   --   --   --  0.8   BASOPHIL % %  --   --   --   --   --   --  0.3   IMM GRAN % %  --   --   --   --   --   --  0.6*       Results from last 7 days  Lab Units 02/15/17  0256   INR  1.21*                     GLUCOSE   Date/Time Value Ref Range Status   02/17/2017 1121 300 (H) 70 - 130 mg/dL Final   02/17/2017 0739 190 (H) 70 - 130 mg/dL Final   02/16/2017 2037 287 (H) 70 - 130 mg/dL Final   02/16/2017 1835 279 (H)  70 - 130 mg/dL Final   02/16/2017 1152 140 (H) 70 - 130 mg/dL Final   02/16/2017 0725 248 (H) 70 - 130 mg/dL Final   02/16/2017 0657 265 (H) 70 - 130 mg/dL Final   02/15/2017 1750 136 (H) 70 - 130 mg/dL Final                                       Medication Review:     cholecalciferol 1,000 Units Oral Daily   cycloSPORINE 1 drop Both Eyes BID   DULoxetine 90 mg Oral Q PM   ferrous sulfate 325 mg Oral BID With Meals   folic acid 1 mg Oral Q PM   insulin aspart 0-9 Units Subcutaneous 4x Daily AC & at Bedtime   insulin detemir 30 Units Subcutaneous Nightly   levETIRAcetam 500 mg Oral Q12H   metoclopramide 5 mg Oral 4x Daily AC & at Bedtime   pantoprazole 40 mg Oral BID AC   pravastatin 40 mg Oral Nightly   pregabalin 75 mg Oral Q12H   rifaximin 550 mg Oral Q12H   sucralfate 1 g Oral BID   vitamin B-12 1,000 mcg Oral Q PM         octreotide (SandoSTATIN) infusion 25 mcg/hr Last Rate: 25 mcg/hr (02/16/17 1140)   sodium chloride 125 mL/hr Last Rate: 125 mL/hr (02/17/17 0828)       ASSESSMENT:   None alcoholic steatohepatitis with esophageal varices and upper GI bleed  Diabetes mellitus on insulin with hyperglycemia  Rheumatoid arthritis  Systemic lupus erythematosus  Suspected interstitial lung disease due to the above  Acute blood loss anemia    PLAN:  Still on octreotide drip and will defer to GI for further recommendations of when to stop     On home medication regimen including rifaximin, insulin, Keppra, antidepressant.    Overall hemodynamically stable status post successful banding of his review of varices no sign of any active ongoing bleeding    Can possibly be discharged in the next 1-2 days if okay with GI and no further bleeding.      Kenia Dimas MD  02/17/17  2:42 PM    EMR Dragon/Transcription disclaimer:   Much of this encounter note is an electronic transcription/translation of spoken language to printed text. The electronic translation of spoken language may permit erroneous, or at times, nonsensical  words or phrases to be inadvertently transcribed; Although I have reviewed the note for such errors, some may still exist.

## 2017-02-17 NOTE — PLAN OF CARE
Problem: Patient Care Overview (Adult)  Goal: Plan of Care Review  Outcome: Ongoing (interventions implemented as appropriate)    02/17/17 7120   Coping/Psychosocial Response Interventions   Plan Of Care Reviewed With patient   Patient Care Overview   Progress improving   Outcome Evaluation   Outcome Summary/Follow up Plan no active signs of bleeding, BM's reg in color - brown/green now, no signs of bleeding , pt still on IV fluids NS @125 , & Octreotide 5ml/hr, pt up with assist to BSC, pt on protonix 40mg po bid, per Pulm - pt may be able to d/c in 1-2 days...occasional pain and Nausea, Pain and nausea med available, VSS,        Goal: Discharge Needs Assessment  Outcome: Ongoing (interventions implemented as appropriate)    Problem: Gastrointestinal Bleeding (Adult)  Goal: Signs and Symptoms of Listed Potential Problems Will be Absent or Manageable (Gastrointestinal Bleeding)  Outcome: Ongoing (interventions implemented as appropriate)    Problem: Fall Risk (Adult)  Goal: Absence of Falls  Outcome: Ongoing (interventions implemented as appropriate)

## 2017-02-17 NOTE — SIGNIFICANT NOTE
02/17/17 1145   PT Discharge Summary   Reason for Discharge All goals achieved   Outcomes Achieved Able to achieve all goals within established timeline   Discharge Destination Home with assist

## 2017-02-17 NOTE — PROGRESS NOTES
[facility]     Inpatient Follow Up Note    Patient Identification:  Name: Silvia Zabala  Age: 56 y.o.  Sex: female  :  1960  MRN: [id number]    Information from:patient    Chief Complaint   Patient presents with   • Vomiting Blood       History:   Doing OK. No melena or hematochezia. Some nausea earlier in the day. Tolerating full liquids. Has been OOB with assistance.     Review of Systems:  Constitutional:  Negative   Cardiovascular:  Negative   Respiratory:  Negative             Problem List:  Patient Active Problem List    Diagnosis   • UGI bleed [K92.2]   • Generalized abdominal pain [R10.84]   • Hyperlipidemia [E78.5]   • Nausea [R11.0]   • DUKES (nonalcoholic steatohepatitis) [K75.81]   • Pancytopenia [D61.818]   • Hypersplenism [D73.1]   • Type 2 diabetes mellitus, uncontrolled, with neuropathy [E11.40, E11.65]   • Fibrositis [M79.7]   • Change in blood platelet count [ABQ8766]   • Cirrhosis of liver [K74.60]   • Hepatic encephalopathy [K72.90]   • Rheumatoid arthritis [M06.9]   • Anxiety and depression [F41.9, F32.9]     Current Meds:  MAR Reviewed  Scheduled Meds:  cholecalciferol 1,000 Units Oral Daily   cycloSPORINE 1 drop Both Eyes BID   DULoxetine 90 mg Oral Q PM   ferrous sulfate 325 mg Oral BID With Meals   folic acid 1 mg Oral Q PM   insulin aspart 0-9 Units Subcutaneous 4x Daily AC & at Bedtime   insulin detemir 30 Units Subcutaneous Nightly   levETIRAcetam 500 mg Oral Q12H   metoclopramide 5 mg Oral 4x Daily AC & at Bedtime   pantoprazole 40 mg Oral BID AC   pravastatin 40 mg Oral Nightly   pregabalin 75 mg Oral Q12H   rifaximin 550 mg Oral Q12H   sucralfate 1 g Oral BID   vitamin B-12 1,000 mcg Oral Q PM     Continuous Infusions:  octreotide (SandoSTATIN) infusion 25 mcg/hr Last Rate: 25 mcg/hr (17 1140)   sodium chloride 125 mL/hr Last Rate: 125 mL/hr (17 1646)     PRN Meds:.•  ALPRAZolam  •  cyclobenzaprine  •  dextrose  •  dextrose  •   "diphenhydrAMINE  •  docusate sodium  •  glucagon (human recombinant)  •  Morphine  •  Morphine  •  ondansetron  •  Insert peripheral IV **AND** sodium chloride  Allergies:  Allergies   Allergen Reactions   • Albuterol Anaphylaxis   • Quinine Derivatives Nausea And Vomiting   • Tramadol        Intake/Output:     Intake/Output Summary (Last 24 hours) at 17 1750  Last data filed at 17 0800   Gross per 24 hour   Intake   1316 ml   Output    400 ml   Net    916 ml     New allergies/reactions:  None    Physical Exam:  Vitals:   Temp (24hrs), Av.1 °F (36.7 °C), Min:98 °F (36.7 °C), Max:98.2 °F (36.8 °C)    Temp:  [98 °F (36.7 °C)-98.2 °F (36.8 °C)] 98.2 °F (36.8 °C)  Heart Rate:  [93-97] 97  Resp:  [16-18] 16  BP: (131-142)/(80-85) 138/80  Visit Vitals   • /80 (BP Location: Right arm, Patient Position: Lying)   • Pulse 97   • Temp 98.2 °F (36.8 °C) (Oral)   • Resp 16   • Ht 67\" (170.2 cm)   • Wt 179 lb 10.8 oz (81.5 kg)   • LMP Comment: hysterectomy   • SpO2 97%   • BMI 28.14 kg/m2       Exam:  NAD  PERRLA. Sclerae and conjunctivae normal  HENT: external inspection normal. Hearing intact.  No respiratory distress.   Alert, oriented, normal affect.          DATA:  Radiology and Labs:   Recent Results (from the past 24 hour(s))   POC Glucose Fingerstick    Collection Time: 17  6:35 PM   Result Value Ref Range    Glucose 279 (H) 70 - 130 mg/dL   Hemoglobin & Hematocrit, Blood    Collection Time: 17  7:57 PM   Result Value Ref Range    Hemoglobin 9.2 (L) 11.9 - 15.5 g/dL    Hematocrit 27.4 (L) 35.6 - 45.5 %   POC Glucose Fingerstick    Collection Time: 17  8:37 PM   Result Value Ref Range    Glucose 287 (H) 70 - 130 mg/dL   POC Glucose Fingerstick    Collection Time: 17  7:39 AM   Result Value Ref Range    Glucose 190 (H) 70 - 130 mg/dL   Hemoglobin & Hematocrit, Blood    Collection Time: 17  8:10 AM   Result Value Ref Range    Hemoglobin 9.0 (L) 11.9 - 15.5 g/dL    " Hematocrit 27.5 (L) 35.6 - 45.5 %   POC Glucose Fingerstick    Collection Time: 02/17/17 11:21 AM   Result Value Ref Range    Glucose 300 (H) 70 - 130 mg/dL   POC Glucose Fingerstick    Collection Time: 02/17/17  4:38 PM   Result Value Ref Range    Glucose 291 (H) 70 - 130 mg/dL       Assessment:   Problem List:   Active Problems:    UGI bleed    The nausea is a bit bothersome but otherwise seems to be doing well.     Plan:   Continue octreotide. I am following this weekend. May stop the octreotide in the AM.       Rich Coleman MD  [facility], [place], operated by [facility]/[place]  2/17/2017

## 2017-02-17 NOTE — PLAN OF CARE
Problem: Inpatient Physical Therapy  Goal: Transfer Training Goal 1 LTG- PT  Outcome: Outcome(s) achieved Date Met:  02/17/17 02/17/17 1142   Transfer Training PT LTG   Transfer Training PT LTG, Outcome goal met

## 2017-02-18 LAB
GLUCOSE BLDC GLUCOMTR-MCNC: 228 MG/DL (ref 70–130)
GLUCOSE BLDC GLUCOMTR-MCNC: 256 MG/DL (ref 70–130)
GLUCOSE BLDC GLUCOMTR-MCNC: 270 MG/DL (ref 70–130)
GLUCOSE BLDC GLUCOMTR-MCNC: 288 MG/DL (ref 70–130)
HCT VFR BLD AUTO: 25 % (ref 35.6–45.5)
HCT VFR BLD AUTO: 25.2 % (ref 35.6–45.5)
HGB BLD-MCNC: 8.2 G/DL (ref 11.9–15.5)
HGB BLD-MCNC: 8.5 G/DL (ref 11.9–15.5)

## 2017-02-18 PROCEDURE — 25010000002 OCTREOTIDE PER 25 MCG: Performed by: INTERNAL MEDICINE

## 2017-02-18 PROCEDURE — 63710000001 INSULIN ASPART PER 5 UNITS: Performed by: INTERNAL MEDICINE

## 2017-02-18 PROCEDURE — 25010000002 MORPHINE PER 10 MG: Performed by: INTERNAL MEDICINE

## 2017-02-18 PROCEDURE — 85014 HEMATOCRIT: CPT | Performed by: INTERNAL MEDICINE

## 2017-02-18 PROCEDURE — 99232 SBSQ HOSP IP/OBS MODERATE 35: CPT | Performed by: INTERNAL MEDICINE

## 2017-02-18 PROCEDURE — 82962 GLUCOSE BLOOD TEST: CPT

## 2017-02-18 PROCEDURE — 85018 HEMOGLOBIN: CPT | Performed by: INTERNAL MEDICINE

## 2017-02-18 RX ADMIN — MORPHINE SULFATE 2 MG: 2 INJECTION, SOLUTION INTRAMUSCULAR; INTRAVENOUS at 13:55

## 2017-02-18 RX ADMIN — OCTREOTIDE ACETATE 25 MCG/HR: 500 INJECTION, SOLUTION INTRAVENOUS; SUBCUTANEOUS at 13:05

## 2017-02-18 RX ADMIN — RIFAXIMIN 550 MG: 550 TABLET ORAL at 08:03

## 2017-02-18 RX ADMIN — MORPHINE SULFATE 2 MG: 2 INJECTION, SOLUTION INTRAMUSCULAR; INTRAVENOUS at 06:55

## 2017-02-18 RX ADMIN — METOCLOPRAMIDE 5 MG: 5 TABLET ORAL at 11:51

## 2017-02-18 RX ADMIN — Medication 1000 MCG: at 17:09

## 2017-02-18 RX ADMIN — VITAMIN D, TAB 1000IU (100/BT) 1000 UNITS: 25 TAB at 08:03

## 2017-02-18 RX ADMIN — ALPRAZOLAM 0.5 MG: 0.5 TABLET ORAL at 23:07

## 2017-02-18 RX ADMIN — INSULIN ASPART 6 UNITS: 100 INJECTION, SOLUTION INTRAVENOUS; SUBCUTANEOUS at 21:54

## 2017-02-18 RX ADMIN — FERROUS SULFATE TAB 325 MG (65 MG ELEMENTAL FE) 325 MG: 325 (65 FE) TAB at 17:10

## 2017-02-18 RX ADMIN — INSULIN ASPART 6 UNITS: 100 INJECTION, SOLUTION INTRAVENOUS; SUBCUTANEOUS at 17:09

## 2017-02-18 RX ADMIN — SUCRALFATE 1 G: 1 TABLET ORAL at 17:09

## 2017-02-18 RX ADMIN — METOCLOPRAMIDE 5 MG: 5 TABLET ORAL at 20:23

## 2017-02-18 RX ADMIN — METOCLOPRAMIDE 5 MG: 5 TABLET ORAL at 17:09

## 2017-02-18 RX ADMIN — SUCRALFATE 1 G: 1 TABLET ORAL at 08:03

## 2017-02-18 RX ADMIN — FERROUS SULFATE TAB 325 MG (65 MG ELEMENTAL FE) 325 MG: 325 (65 FE) TAB at 08:03

## 2017-02-18 RX ADMIN — INSULIN DETEMIR 30 UNITS: 100 INJECTION, SOLUTION SUBCUTANEOUS at 21:54

## 2017-02-18 RX ADMIN — PREGABALIN 75 MG: 75 CAPSULE ORAL at 20:23

## 2017-02-18 RX ADMIN — RIFAXIMIN 550 MG: 550 TABLET ORAL at 20:23

## 2017-02-18 RX ADMIN — PREGABALIN 75 MG: 75 CAPSULE ORAL at 08:03

## 2017-02-18 RX ADMIN — METOCLOPRAMIDE 5 MG: 5 TABLET ORAL at 08:03

## 2017-02-18 RX ADMIN — CYCLOSPORINE 1 DROP: 0.5 EMULSION OPHTHALMIC at 08:03

## 2017-02-18 RX ADMIN — INSULIN ASPART 4 UNITS: 100 INJECTION, SOLUTION INTRAVENOUS; SUBCUTANEOUS at 08:03

## 2017-02-18 RX ADMIN — PANTOPRAZOLE SODIUM 40 MG: 40 TABLET, DELAYED RELEASE ORAL at 17:09

## 2017-02-18 RX ADMIN — FOLIC ACID 1 MG: 1 TABLET ORAL at 17:10

## 2017-02-18 RX ADMIN — LEVETIRACETAM 500 MG: 500 TABLET, FILM COATED ORAL at 20:23

## 2017-02-18 RX ADMIN — PRAVASTATIN SODIUM 40 MG: 40 TABLET ORAL at 20:23

## 2017-02-18 RX ADMIN — DULOXETINE HYDROCHLORIDE 90 MG: 60 CAPSULE, DELAYED RELEASE ORAL at 17:09

## 2017-02-18 RX ADMIN — SODIUM CHLORIDE 125 ML/HR: 9 INJECTION, SOLUTION INTRAVENOUS at 01:37

## 2017-02-18 RX ADMIN — PANTOPRAZOLE SODIUM 40 MG: 40 TABLET, DELAYED RELEASE ORAL at 08:03

## 2017-02-18 RX ADMIN — MORPHINE SULFATE 2 MG: 2 INJECTION, SOLUTION INTRAMUSCULAR; INTRAVENOUS at 23:07

## 2017-02-18 RX ADMIN — LEVETIRACETAM 500 MG: 500 TABLET, FILM COATED ORAL at 08:03

## 2017-02-18 RX ADMIN — INSULIN ASPART 6 UNITS: 100 INJECTION, SOLUTION INTRAVENOUS; SUBCUTANEOUS at 11:50

## 2017-02-18 RX ADMIN — MORPHINE SULFATE 2 MG: 2 INJECTION, SOLUTION INTRAMUSCULAR; INTRAVENOUS at 18:24

## 2017-02-18 NOTE — PROGRESS NOTES
[facility] INPATIENT PROGRESS NOTE         [address]    2/18/2017      PATIENT IDENTIFICATION:   Name:  Silvia Zabala      MRN:  [id number]     56 y.o.  female             LOS 3    Reason for visit: Follow-up esophageal varices with upper GI bleed and interstitial lung disease related to lupus/rheumatoid arthritis      SUBJECTIVE:    Denies shortness of breath or cough.  On octreotide per GI recommendations.    Objective   OBJECTIVE:    Vital Sign Min/Max for last 24 hours  Temp  Min: 98 °F (36.7 °C)  Max: 98.9 °F (37.2 °C)   BP  Min: 135/75  Max: 138/80   Pulse  Min: 92  Max: 100   Resp  Min: 16  Max: 18   SpO2  Min: 92 %  Max: 97 %   No Data Recorded   No Data Recorded                         Body mass index is 28.14 kg/(m^2).    Intake/Output Summary (Last 24 hours) at 02/18/17 1152  Last data filed at 02/18/17 0137   Gross per 24 hour   Intake   2210 ml   Output      0 ml   Net   2210 ml         Exam:  GEN:  No distress, appears stated age  EYES:   PERRL, anicteric sclera  ENT:    External ears/nose normal, OP clear  NECK:  No adenopathy, midline trachea  LUNGS: Normal chest on inspection, palpation and auscultation  CV:  Normal S1S2, without murmur  ABD:  Non tender, non distended, no hepatosplenomegaly, +BS  EXT:  No edema, cyanosis or clubbing    Scheduled meds:    cholecalciferol 1,000 Units Oral Daily   cycloSPORINE 1 drop Both Eyes BID   DULoxetine 90 mg Oral Q PM   ferrous sulfate 325 mg Oral BID With Meals   folic acid 1 mg Oral Q PM   insulin aspart 0-9 Units Subcutaneous 4x Daily AC & at Bedtime   insulin detemir 30 Units Subcutaneous Nightly   levETIRAcetam 500 mg Oral Q12H   metoclopramide 5 mg Oral 4x Daily AC & at Bedtime   pantoprazole 40 mg Oral BID AC   pravastatin 40 mg Oral Nightly   pregabalin 75 mg Oral Q12H   rifaximin 550 mg Oral Q12H   sucralfate 1 g Oral BID   vitamin B-12 1,000 mcg Oral Q PM     IV meds:                        octreotide (SandoSTATIN) infusion 25  mcg/hr Last Rate: 25 mcg/hr (02/17/17 1817)   sodium chloride 125 mL/hr Last Rate: 125 mL/hr (02/18/17 0137)     Data Review:    Results from last 7 days  Lab Units 02/15/17  1255 02/15/17  0256   SODIUM mmol/L  --  135*   POTASSIUM mmol/L 4.4 5.3*   CHLORIDE mmol/L  --  96*   TOTAL CO2 mmol/L  --  21.6*   BUN mg/dL  --  36*   CREATININE mg/dL  --  0.66   GLUCOSE mg/dL  --  307*   CALCIUM mg/dL  --  9.1         Estimated Creatinine Clearance: 107.1 mL/min (by C-G formula based on Cr of 0.66).    Results from last 7 days  Lab Units 02/18/17  0756 02/17/17  1950 02/17/17  0810 02/16/17  1957 02/16/17  0650  02/15/17  0256   WBC 10*3/mm3  --   --   --   --   --   --  13.79*   HEMOGLOBIN g/dL 8.2* 8.6* 9.0* 9.2* 9.6*  < > 10.7*   PLATELETS 10*3/mm3  --   --   --   --   --   --  199   < > = values in this interval not displayed.    Results from last 7 days  Lab Units 02/15/17  0256   INR  1.21*       Results from last 7 days  Lab Units 02/15/17  0256   ALT (SGPT) U/L 36*   AST (SGOT) U/L 29                 Assessment   ASSESSMENT:   None alcoholic steatohepatitis with esophageal varices and upper GI bleed  Diabetes mellitus on insulin with hyperglycemia  Rheumatoid arthritis  Systemic lupus erythematosus  Suspected interstitial lung disease due to the above  Acute blood loss anemia      PLAN:  Octreotide drip per gastroenterology recommendations.  Continue home medications.  Hemodynamically stable.  Status post successful banding of varices and no further signs of active bleeding.  Discharge home soon when okay with GI.  DC IV fluids.  Follow-up interstitial lung findings as an outpatient after discharge.    Abdirizak Vasquez MD  Pulmonary and Critical Care Medicine  [facility], [facility]  2/18/2017    11:52 AM

## 2017-02-18 NOTE — PROGRESS NOTES
[facility]/[place]     Inpatient Follow Up Note    Patient Identification:  Name: Silvia Zabala  Age: 56 y.o.  Sex: female  :  1960  MRN: [id number]    Information from:patient    Chief Complaint   Patient presents with   • Vomiting Blood       History:   Feels better. No nausea. No melena. Still on full liquids.     Review of Systems:  Constitutional:  Negative   Cardiovascular:  Negative   Respiratory:  Negative             Problem List:  Patient Active Problem List    Diagnosis   • UGI bleed [K92.2]   • Generalized abdominal pain [R10.84]   • Hyperlipidemia [E78.5]   • Nausea [R11.0]   • DUKES (nonalcoholic steatohepatitis) [K75.81]   • Pancytopenia [D61.818]   • Hypersplenism [D73.1]   • Type 2 diabetes mellitus, uncontrolled, with neuropathy [E11.40, E11.65]   • Fibrositis [M79.7]   • Change in blood platelet count [UCM9031]   • Cirrhosis of liver [K74.60]   • Hepatic encephalopathy [K72.90]   • Rheumatoid arthritis [M06.9]   • Anxiety and depression [F41.9, F32.9]     Current Meds:  MAR Reviewed  Scheduled Meds:  cholecalciferol 1,000 Units Oral Daily   cycloSPORINE 1 drop Both Eyes BID   DULoxetine 90 mg Oral Q PM   ferrous sulfate 325 mg Oral BID With Meals   folic acid 1 mg Oral Q PM   insulin aspart 0-9 Units Subcutaneous 4x Daily AC & at Bedtime   insulin detemir 30 Units Subcutaneous Nightly   levETIRAcetam 500 mg Oral Q12H   metoclopramide 5 mg Oral 4x Daily AC & at Bedtime   pantoprazole 40 mg Oral BID AC   pravastatin 40 mg Oral Nightly   pregabalin 75 mg Oral Q12H   rifaximin 550 mg Oral Q12H   sucralfate 1 g Oral BID   vitamin B-12 1,000 mcg Oral Q PM     Continuous Infusions:  octreotide (SandoSTATIN) infusion 25 mcg/hr Last Rate: 25 mcg/hr (17 1305)     PRN Meds:.•  ALPRAZolam  •  cyclobenzaprine  •  dextrose  •  dextrose  •  diphenhydrAMINE  •  docusate sodium  •  glucagon (human recombinant)  •  Morphine  •  Morphine  •  ondansetron  •  Insert peripheral IV  "**AND** sodium chloride  Allergies:  Allergies   Allergen Reactions   • Albuterol Anaphylaxis   • Quinine Derivatives Nausea And Vomiting   • Tramadol        Intake/Output:     Intake/Output Summary (Last 24 hours) at 17 1645  Last data filed at 17 0137   Gross per 24 hour   Intake   2210 ml   Output      0 ml   Net   2210 ml     New allergies/reactions:  None    Physical Exam:  Vitals:   Temp (24hrs), Av.6 °F (37 °C), Min:98 °F (36.7 °C), Max:98.9 °F (37.2 °C)    Temp:  [98 °F (36.7 °C)-98.9 °F (37.2 °C)] 98.7 °F (37.1 °C)  Heart Rate:  [] 94  Resp:  [16-18] 18  BP: (135-139)/(72-83) 139/77  Visit Vitals   • /77 (BP Location: Right arm, Patient Position: Sitting)   • Pulse 94   • Temp 98.7 °F (37.1 °C) (Oral)   • Resp 18   • Ht 67\" (170.2 cm)   • Wt 179 lb 10.8 oz (81.5 kg)   • LMP Comment: hysterectomy   • SpO2 93%   • BMI 28.14 kg/m2       Exam:  NAD  PERRLA. Sclerae and conjunctivae normal  HENT: external inspection normal. Hearing intact.  No respiratory distress. Lungs clear.  Cardiac: no MRG.  Alert, oriented, normal affect.         DATA:  Radiology and Labs:   Recent Results (from the past 24 hour(s))   Hemoglobin & Hematocrit, Blood    Collection Time: 17  7:50 PM   Result Value Ref Range    Hemoglobin 8.6 (L) 11.9 - 15.5 g/dL    Hematocrit 25.5 (L) 35.6 - 45.5 %   POC Glucose Fingerstick    Collection Time: 17 10:58 PM   Result Value Ref Range    Glucose 248 (H) 70 - 130 mg/dL   POC Glucose Fingerstick    Collection Time: 17  7:38 AM   Result Value Ref Range    Glucose 228 (H) 70 - 130 mg/dL   Hemoglobin & Hematocrit, Blood    Collection Time: 17  7:56 AM   Result Value Ref Range    Hemoglobin 8.2 (L) 11.9 - 15.5 g/dL    Hematocrit 25.0 (L) 35.6 - 45.5 %   POC Glucose Fingerstick    Collection Time: 17 11:37 AM   Result Value Ref Range    Glucose 288 (H) 70 - 130 mg/dL   POC Glucose Fingerstick    Collection Time: 17  4:18 PM   Result Value " Ref Range    Glucose 256 (H) 70 - 130 mg/dL       Assessment:   Problem List:   Active Problems:    UGI bleed    Doing well.     Plan:   D/C octreotide. Advance diet. Maybe home tomorrow.       Rich Coleman MD  [facility]/[place]  2/18/2017

## 2017-02-18 NOTE — PLAN OF CARE
Problem: Patient Care Overview (Adult)  Goal: Plan of Care Review  Outcome: Ongoing (interventions implemented as appropriate)    02/18/17 1548   Coping/Psychosocial Response Interventions   Plan Of Care Reviewed With patient   Patient Care Overview   Progress improving   Outcome Evaluation   Outcome Summary/Follow up Plan pain controlled, no S&S of bleeding         Problem: Gastrointestinal Bleeding (Adult)  Goal: Signs and Symptoms of Listed Potential Problems Will be Absent or Manageable (Gastrointestinal Bleeding)  Outcome: Ongoing (interventions implemented as appropriate)    Problem: Fall Risk (Adult)  Goal: Absence of Falls  Outcome: Ongoing (interventions implemented as appropriate)

## 2017-02-18 NOTE — PLAN OF CARE
Problem: Patient Care Overview (Adult)  Goal: Plan of Care Review  Outcome: Ongoing (interventions implemented as appropriate)    02/17/17 5983   Coping/Psychosocial Response Interventions   Plan Of Care Reviewed With patient   Patient Care Overview   Progress improving   Outcome Evaluation   Outcome Summary/Follow up Plan Pt seems to be doing well. No S&S of bleeding. No c/o nausea. Still on octreotide gtt which will possibly be d/c'd in AM per GI note. Will continue to monitor.       Goal: Adult Individualization and Mutuality  Outcome: Ongoing (interventions implemented as appropriate)  Goal: Discharge Needs Assessment  Outcome: Ongoing (interventions implemented as appropriate)    Problem: Gastrointestinal Bleeding (Adult)  Goal: Signs and Symptoms of Listed Potential Problems Will be Absent or Manageable (Gastrointestinal Bleeding)  Outcome: Ongoing (interventions implemented as appropriate)

## 2017-02-19 VITALS
HEIGHT: 67 IN | OXYGEN SATURATION: 93 % | DIASTOLIC BLOOD PRESSURE: 82 MMHG | SYSTOLIC BLOOD PRESSURE: 136 MMHG | BODY MASS INDEX: 31.52 KG/M2 | TEMPERATURE: 98.4 F | RESPIRATION RATE: 18 BRPM | HEART RATE: 93 BPM | WEIGHT: 200.8 LBS

## 2017-02-19 LAB
GLUCOSE BLDC GLUCOMTR-MCNC: 229 MG/DL (ref 70–130)
GLUCOSE BLDC GLUCOMTR-MCNC: 311 MG/DL (ref 70–130)
HCT VFR BLD AUTO: 25.4 % (ref 35.6–45.5)
HGB BLD-MCNC: 8.5 G/DL (ref 11.9–15.5)

## 2017-02-19 PROCEDURE — 85014 HEMATOCRIT: CPT | Performed by: INTERNAL MEDICINE

## 2017-02-19 PROCEDURE — 82962 GLUCOSE BLOOD TEST: CPT

## 2017-02-19 PROCEDURE — 85018 HEMOGLOBIN: CPT | Performed by: INTERNAL MEDICINE

## 2017-02-19 PROCEDURE — 63710000001 INSULIN ASPART PER 5 UNITS: Performed by: INTERNAL MEDICINE

## 2017-02-19 RX ADMIN — PREGABALIN 75 MG: 75 CAPSULE ORAL at 08:05

## 2017-02-19 RX ADMIN — METOCLOPRAMIDE 5 MG: 5 TABLET ORAL at 08:05

## 2017-02-19 RX ADMIN — LEVETIRACETAM 500 MG: 500 TABLET, FILM COATED ORAL at 08:05

## 2017-02-19 RX ADMIN — FERROUS SULFATE TAB 325 MG (65 MG ELEMENTAL FE) 325 MG: 325 (65 FE) TAB at 08:05

## 2017-02-19 RX ADMIN — RIFAXIMIN 550 MG: 550 TABLET ORAL at 08:05

## 2017-02-19 RX ADMIN — INSULIN ASPART 4 UNITS: 100 INJECTION, SOLUTION INTRAVENOUS; SUBCUTANEOUS at 08:06

## 2017-02-19 RX ADMIN — METOCLOPRAMIDE 5 MG: 5 TABLET ORAL at 11:48

## 2017-02-19 RX ADMIN — VITAMIN D, TAB 1000IU (100/BT) 1000 UNITS: 25 TAB at 08:05

## 2017-02-19 RX ADMIN — PANTOPRAZOLE SODIUM 40 MG: 40 TABLET, DELAYED RELEASE ORAL at 08:05

## 2017-02-19 RX ADMIN — INSULIN ASPART 7 UNITS: 100 INJECTION, SOLUTION INTRAVENOUS; SUBCUTANEOUS at 11:48

## 2017-02-19 RX ADMIN — SUCRALFATE 1 G: 1 TABLET ORAL at 08:05

## 2017-02-19 RX ADMIN — CYCLOSPORINE 1 DROP: 0.5 EMULSION OPHTHALMIC at 08:05

## 2017-02-19 NOTE — DISCHARGE INSTR - LAB
Contact Dr. Coleman's office on Monday to schedule a follow-up appointment in 2 weeks.  You can reach [facility] at [phone]

## 2017-02-19 NOTE — PLAN OF CARE
Problem: Patient Care Overview (Adult)  Goal: Plan of Care Review  Outcome: Ongoing (interventions implemented as appropriate)    02/19/17 1055   Coping/Psychosocial Response Interventions   Plan Of Care Reviewed With patient   Patient Care Overview   Progress improving   Outcome Evaluation   Outcome Summary/Follow up Plan pt tolerating meals, no complaints of pain         Problem: Gastrointestinal Bleeding (Adult)  Goal: Signs and Symptoms of Listed Potential Problems Will be Absent or Manageable (Gastrointestinal Bleeding)  Outcome: Ongoing (interventions implemented as appropriate)    Problem: Fall Risk (Adult)  Goal: Absence of Falls  Outcome: Ongoing (interventions implemented as appropriate)

## 2017-02-19 NOTE — DISCHARGE SUMMARY
PHYSICIAN DISCHARGE SUMMARY                                                                        [facility]    Date of admit: 2/15/2017  Date of Discharge:  2/19/2017    PCP: Blanca Pitts MD    Discharge Diagnosis:   Active Problems:    UGI bleed  None alcoholic steatohepatitis with esophageal varices and upper GI bleed  Diabetes mellitus on insulin with hyperglycemia  Rheumatoid arthritis  Systemic lupus erythematosus  Suspected interstitial lung disease due to the above  Acute blood loss anemia    Secondary Diagnoses:  Past Medical History   Diagnosis Date   • Anemia    • Anxiety    • Arthritis    • 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   • Cirrhosis    • Colon polyps    • Deafness    • Depression    • Diabetes mellitus      Adult onset, insulin requiring   • Duodenal ulcer with hemorrhage    • Fibromyalgia    • Gallbladder disease    • Gastroparesis    • Glaucoma    • Granuloma annulare    • H/O B12 deficiency    • H/O Bleeding ulcer      And gastroesophageal varices   • H/O mixed connective tissue disorder    • Hemorrhoids    • Hiatal hernia    • Hx of being hospitalized      In [place] for GI bleeding from ulcer and varices   • Hyperlipidemia    • Migraine    • BRICE (obstructive sleep apnea)    • Pancreas divisum    • Pancytopenia      Chronic, due to cirrhosis and hypersplenism   • Peptic ulcer disease      And esophageal varices   • PONV (postoperative nausea and vomiting)    • RA (rheumatoid arthritis)    • Seizure disorder    • Seizures    • Systemic lupus        Procedures Performed  Procedure(s):  ESOPHAGOGASTRODUODENOSCOPY AT BEDSIDE with esophageal varices banding (3)         Consults: Gastroenterology with Dr. Coleman      [facility] Course  Patient is a 56 y.o. female presented with per H&P  56-year-old female with  acute upper GI bleeding. She has a history of an DUEKS and esophageal varices. She had esophageal varices banded in December 2016. She recently also had an esophageal ulcer that was cauterized.  She was evaluated by the liver transplant team and is considered to be not a transplant candidate as of yet. Patient states that she always has some abdominal pain. She has been somewhat nauseous in about 3 weeks ago had an episode of emesis. However this was not bloody. Today she had the bright red blood emesis. On the ER she had very large bloody emesis. She has been dizzy and lightheaded. However hemoglobin on entry was okay.     Patient has a significant history of rheumatoid arthritis and lupus. She is on biologic agents for same. More recently she is suspected of having interstitial lung disease related to her rheumatological issues. She does not use oxygen at home and does not have any exercise limitation due to shortness of breath as of yet. She does have diabetes mellitus. She denies any heart disease or prior history of cancer.      Patient admitted with:  ASSESSMENT:   None alcoholic steatohepatitis with esophageal varices and upper GI bleed  Diabetes mellitus on insulin with hyperglycemia  Rheumatoid arthritis  Systemic lupus erythematosus  Suspected interstitial lung disease due to the above  Acute blood loss anemia    Hospital course:  Patient admitted with acute GI bleed with esophageal varices.  Has cirrhosis with Dukes.  Gastroenterology consult it.  Underwent endoscopy with Dr. Coleman on 2/15/17 has been on octreotide and PPI.  Octreotide discontinued yesterday and planned for discharge home.  Of note she also had findings of interstitial lung markings which will require follow-up.  She has a history of lupus and may have interstitial lung disease related to this/RA.  Her acute blood loss anemia stabilized.  Plan for discharge today with follow-up as outpatient.    Condition on Discharge:  Stable.      Vital  Signs  Temp:  [98.4 °F (36.9 °C)-98.7 °F (37.1 °C)] 98.4 °F (36.9 °C)  Heart Rate:  [93-98] 93  Resp:  [18] 18  BP: (135-141)/(74-82) 136/82    Physical Exam:  General Appearance: no acute distress, appears comfortable  Heart: regular rate and rhythm  Lungs: clear to auscultation bilaterally, respirations unlabored  Abdomen: soft, non-tender, non-distended, no guarding/rebound, nl BS  Extremeties: no edema, no cyanosis  Neurology: speech normal, mental status normal, moving all four extremeties  Mental Status: alert, oriented    Discharge Disposition  Home or Self Care    Discharge Medications   VjSilvia   Home Medication Instructions MEGHANN:[id number]    Printed on:02/19/17 1048   Medication Information                      ALPRAZolam (XANAX) 0.5 MG tablet  TAKE ONE TABLET BY MOUTH ONCE NIGHTLY AS NEEDED FOR ANXIETY             cholecalciferol (VITAMIN D3) 1000 UNITS tablet  Take 1,000 Units by mouth Daily.             cyclobenzaprine (FLEXERIL) 5 MG tablet  Take 1 tablet by mouth 2 (two) times a day as needed for muscle spasms.             cycloSPORINE (RESTASIS) 0.05 % ophthalmic emulsion  1 drop 2 (two) times a day.             diphenhydrAMINE (BENADRYL) 25 mg capsule  Take 25 mg by mouth Every 6 (Six) Hours As Needed.             docusate sodium (COLACE) 100 MG capsule  Take 100 mg by mouth as needed for constipation.             DULoxetine (CYMBALTA) 30 MG capsule  Take 3 capsules daily             Etanercept 50 MG/ML solution auto-injector  Inject under the skin.             ferrous sulfate 325 (65 FE) MG tablet  TAKE ONE TABLET BY MOUTH TWICE A DAY             folic acid (FOLVITE) 1 MG tablet  TAKE ONE TABLET BY MOUTH DAILY             glucose blood (ONE TOUCH ULTRA TEST) test strip  Test blood sugar 4 times daily             Insulin Glargine (LANTUS SOLOSTAR) 100 UNIT/ML injection pen  INJECT 80 UNITS UNDER THE SKIN DAILY             Insulin Pen Needle 31G X 5 MM misc  To inject 5 -6 times per day              levETIRAcetam (KEPPRA) 500 MG tablet  TAKE ONE TABLET BY MOUTH TWICE A DAY             LYRICA 75 MG capsule  TAKE ONE CAPSULE BY MOUTH TWICE A DAY             MELATONIN PO  Take 20 mg by mouth Daily.             metFORMIN (GLUCOPHAGE) 1000 MG tablet  Take 1 tablet by mouth 2 (Two) Times a Day With Meals.             metoclopramide (REGLAN) 5 MG tablet  TAKE ONE TABLET BY MOUTH BEFORE MEALS AND AT BEDTIME; **MUST CALL MD FOR APPOINTMENT             Multiple Vitamin (MULTI-VITAMIN PO)  Take  by mouth.             NOVOLOG FLEXPEN 100 UNIT/ML solution pen-injector sc pen  INJECT 40 UNITS UNDER THE SKIN BEFORE EACH MEAL AND 25 units before SNACKS             pantoprazole (PROTONIX) 40 MG EC tablet  Take 40 mg by mouth daily.             POTASSIUM PO  Take  by mouth.             pravastatin (PRAVACHOL) 40 MG tablet  Take 1 tablet by mouth Daily.             sucralfate (CARAFATE) 1 G tablet  Take 1 tablet by mouth 2 (two) times a day.             vitamin B-12 (CYANOCOBALAMIN) 1000 MCG tablet  Take 1,000 mcg by mouth daily.             XIFAXAN 550 MG tablet  TAKE ONE TABLET BY MOUTH TWICE A DAY                 Discharge Diet: regular diet    Activity at Discharge:     Follow-up Information     Follow up with Abdirizak Vasquez MD Follow up in 2 week(s).    Specialty:  Pulmonary Disease    Contact information:    [address] [place], [place] [id number] [phone]          See primary care provider, Blanca Pitts MD, in 1-2 weeks        Test Results Pending at Discharge       Abdirizak Vasquez MD  [facility], [facility]  Pulmonary and Critical Care Medicine  02/19/17  10:46 AM    Time: greater than 30 minutes.        Total time spent discharging patient including evaluation,post hospitalization follow up,  medication and post hospitalization instructions and education total time exceeds 30 minutes.

## 2017-02-20 ENCOUNTER — TELEPHONE (OUTPATIENT)
Dept: GASTROENTEROLOGY | Facility: CLINIC | Age: 57
End: 2017-02-20

## 2017-02-20 DIAGNOSIS — R11.0 NAUSEA: Primary | ICD-10-CM

## 2017-02-20 RX ORDER — PROMETHAZINE HYDROCHLORIDE 25 MG/1
25 TABLET ORAL EVERY 6 HOURS PRN
Status: DISCONTINUED | OUTPATIENT
Start: 2017-02-20 | End: 2017-04-26 | Stop reason: HOSPADM

## 2017-02-20 NOTE — TELEPHONE ENCOUNTER
"Pt would like Phenergan rx sent to her [facility] on [address]    Dr Coleman put in Phenergan rx but it was placed under \"facility administered med\" instead of pharmacy, so it was never sent thru to [facility].    Called [facility] [phone], [facility] with pharmacist with rx info as written by Dr Coleman:  Phenergan 25 mg tab, 1 tab po every 6 hr prn nausea/vomiting, #30, no refills.  "

## 2017-02-21 ENCOUNTER — HOSPITAL ENCOUNTER (INPATIENT)
Facility: HOSPITAL | Age: 57
LOS: 3 days | Discharge: HOME | End: 2017-02-24
Attending: EMERGENCY MEDICINE | Admitting: INTERNAL MEDICINE

## 2017-02-21 ENCOUNTER — TELEPHONE (OUTPATIENT)
Dept: GASTROENTEROLOGY | Facility: CLINIC | Age: 57
End: 2017-02-21

## 2017-02-21 ENCOUNTER — APPOINTMENT (OUTPATIENT)
Dept: CT IMAGING | Facility: HOSPITAL | Age: 57
End: 2017-02-21

## 2017-02-21 DIAGNOSIS — R18.8 OTHER ASCITES: ICD-10-CM

## 2017-02-21 DIAGNOSIS — K74.69 OTHER CIRRHOSIS OF LIVER (HCC): ICD-10-CM

## 2017-02-21 DIAGNOSIS — K75.81 NASH (NONALCOHOLIC STEATOHEPATITIS): ICD-10-CM

## 2017-02-21 DIAGNOSIS — R10.84 GENERALIZED ABDOMINAL PAIN: Primary | ICD-10-CM

## 2017-02-21 DIAGNOSIS — R11.0 NAUSEA: ICD-10-CM

## 2017-02-21 LAB
ALBUMIN SERPL-MCNC: 3.4 G/DL (ref 3.5–5.2)
ALBUMIN/GLOB SERPL: 1.2 G/DL
ALP SERPL-CCNC: 83 U/L (ref 39–117)
ALT SERPL W P-5'-P-CCNC: 38 U/L (ref 1–33)
AMMONIA BLD-SCNC: 47 UMOL/L (ref 11–51)
ANION GAP SERPL CALCULATED.3IONS-SCNC: 15.3 MMOL/L
APTT PPP: 29.7 SECONDS (ref 22.7–35.4)
AST SERPL-CCNC: 35 U/L (ref 1–32)
BACTERIA UR QL AUTO: ABNORMAL /HPF
BASOPHILS # BLD AUTO: 0.01 10*3/MM3 (ref 0–0.2)
BASOPHILS NFR BLD AUTO: 0.2 % (ref 0–1.5)
BILIRUB SERPL-MCNC: 0.8 MG/DL (ref 0.1–1.2)
BILIRUB UR QL STRIP: NEGATIVE
BUN BLD-MCNC: 4 MG/DL (ref 6–20)
BUN/CREAT SERPL: 5.6 (ref 7–25)
CALCIUM SPEC-SCNC: 8.7 MG/DL (ref 8.6–10.5)
CHLORIDE SERPL-SCNC: 99 MMOL/L (ref 98–107)
CLARITY UR: ABNORMAL
CO2 SERPL-SCNC: 24.7 MMOL/L (ref 22–29)
COLOR UR: YELLOW
CREAT BLD-MCNC: 0.71 MG/DL (ref 0.57–1)
DEPRECATED RDW RBC AUTO: 52.7 FL (ref 37–54)
EOSINOPHIL # BLD AUTO: 0.08 10*3/MM3 (ref 0–0.7)
EOSINOPHIL NFR BLD AUTO: 1.7 % (ref 0.3–6.2)
ERYTHROCYTE [DISTWIDTH] IN BLOOD BY AUTOMATED COUNT: 17.1 % (ref 11.7–13)
GFR SERPL CREATININE-BSD FRML MDRD: 86 ML/MIN/1.73
GLOBULIN UR ELPH-MCNC: 2.8 GM/DL
GLUCOSE BLD-MCNC: 232 MG/DL (ref 65–99)
GLUCOSE BLDC GLUCOMTR-MCNC: 200 MG/DL (ref 70–130)
GLUCOSE UR STRIP-MCNC: NEGATIVE MG/DL
HCT VFR BLD AUTO: 29.6 % (ref 35.6–45.5)
HGB BLD-MCNC: 9.8 G/DL (ref 11.9–15.5)
HGB UR QL STRIP.AUTO: NEGATIVE
HOLD SPECIMEN: NORMAL
HOLD SPECIMEN: NORMAL
HYALINE CASTS UR QL AUTO: ABNORMAL /LPF
IMM GRANULOCYTES # BLD: 0.09 10*3/MM3 (ref 0–0.03)
IMM GRANULOCYTES NFR BLD: 1.9 % (ref 0–0.5)
INR PPP: 1.16 (ref 0.9–1.1)
KETONES UR QL STRIP: ABNORMAL
LEUKOCYTE ESTERASE UR QL STRIP.AUTO: ABNORMAL
LIPASE SERPL-CCNC: 9 U/L (ref 13–60)
LYMPHOCYTES # BLD AUTO: 0.99 10*3/MM3 (ref 0.9–4.8)
LYMPHOCYTES NFR BLD AUTO: 20.7 % (ref 19.6–45.3)
MCH RBC QN AUTO: 29 PG (ref 26.9–32)
MCHC RBC AUTO-ENTMCNC: 33.1 G/DL (ref 32.4–36.3)
MCV RBC AUTO: 87.6 FL (ref 80.5–98.2)
MONOCYTES # BLD AUTO: 0.47 10*3/MM3 (ref 0.2–1.2)
MONOCYTES NFR BLD AUTO: 9.8 % (ref 5–12)
NEUTROPHILS # BLD AUTO: 3.14 10*3/MM3 (ref 1.9–8.1)
NEUTROPHILS NFR BLD AUTO: 65.7 % (ref 42.7–76)
NITRITE UR QL STRIP: NEGATIVE
PH UR STRIP.AUTO: 6 [PH] (ref 5–8)
PLATELET # BLD AUTO: 120 10*3/MM3 (ref 140–500)
PMV BLD AUTO: 10.7 FL (ref 6–12)
POTASSIUM BLD-SCNC: 3.8 MMOL/L (ref 3.5–5.2)
PROT SERPL-MCNC: 6.2 G/DL (ref 6–8.5)
PROT UR QL STRIP: NEGATIVE
PROTHROMBIN TIME: 14.3 SECONDS (ref 11.7–14.2)
RBC # BLD AUTO: 3.38 10*6/MM3 (ref 3.9–5.2)
RBC # UR: ABNORMAL /HPF
REF LAB TEST METHOD: ABNORMAL
SODIUM BLD-SCNC: 139 MMOL/L (ref 136–145)
SP GR UR STRIP: 1.01 (ref 1–1.03)
SQUAMOUS #/AREA URNS HPF: ABNORMAL /HPF
UROBILINOGEN UR QL STRIP: ABNORMAL
WBC NRBC COR # BLD: 4.78 10*3/MM3 (ref 4.5–10.7)
WBC UR QL AUTO: ABNORMAL /HPF
WHOLE BLOOD HOLD SPECIMEN: NORMAL
WHOLE BLOOD HOLD SPECIMEN: NORMAL
YEAST URNS QL MICRO: ABNORMAL /HPF

## 2017-02-21 PROCEDURE — 87040 BLOOD CULTURE FOR BACTERIA: CPT | Performed by: INTERNAL MEDICINE

## 2017-02-21 PROCEDURE — 99284 EMERGENCY DEPT VISIT MOD MDM: CPT

## 2017-02-21 PROCEDURE — 0 IOPAMIDOL 61 % SOLUTION: Performed by: EMERGENCY MEDICINE

## 2017-02-21 PROCEDURE — 85025 COMPLETE CBC W/AUTO DIFF WBC: CPT | Performed by: EMERGENCY MEDICINE

## 2017-02-21 PROCEDURE — 25010000002 ONDANSETRON PER 1 MG: Performed by: EMERGENCY MEDICINE

## 2017-02-21 PROCEDURE — 85610 PROTHROMBIN TIME: CPT | Performed by: EMERGENCY MEDICINE

## 2017-02-21 PROCEDURE — 74177 CT ABD & PELVIS W/CONTRAST: CPT

## 2017-02-21 PROCEDURE — 82140 ASSAY OF AMMONIA: CPT | Performed by: INTERNAL MEDICINE

## 2017-02-21 PROCEDURE — 87147 CULTURE TYPE IMMUNOLOGIC: CPT | Performed by: EMERGENCY MEDICINE

## 2017-02-21 PROCEDURE — 81001 URINALYSIS AUTO W/SCOPE: CPT | Performed by: EMERGENCY MEDICINE

## 2017-02-21 PROCEDURE — 80053 COMPREHEN METABOLIC PANEL: CPT | Performed by: EMERGENCY MEDICINE

## 2017-02-21 PROCEDURE — 25010000003 CEFTRIAXONE PER 250 MG: Performed by: INTERNAL MEDICINE

## 2017-02-21 PROCEDURE — 85730 THROMBOPLASTIN TIME PARTIAL: CPT | Performed by: EMERGENCY MEDICINE

## 2017-02-21 PROCEDURE — 25010000002 MORPHINE PER 10 MG

## 2017-02-21 PROCEDURE — 25010000002 ONDANSETRON PER 1 MG

## 2017-02-21 PROCEDURE — 63710000001 INSULIN ASPART PER 5 UNITS: Performed by: INTERNAL MEDICINE

## 2017-02-21 PROCEDURE — 83690 ASSAY OF LIPASE: CPT | Performed by: EMERGENCY MEDICINE

## 2017-02-21 PROCEDURE — 87086 URINE CULTURE/COLONY COUNT: CPT | Performed by: EMERGENCY MEDICINE

## 2017-02-21 PROCEDURE — 25010000002 MORPHINE PER 10 MG: Performed by: EMERGENCY MEDICINE

## 2017-02-21 PROCEDURE — 63710000001 INSULIN DETEMER PER 5 UNITS: Performed by: INTERNAL MEDICINE

## 2017-02-21 PROCEDURE — 82962 GLUCOSE BLOOD TEST: CPT

## 2017-02-21 RX ORDER — SODIUM CHLORIDE 9 MG/ML
125 INJECTION, SOLUTION INTRAVENOUS CONTINUOUS
Status: DISCONTINUED | OUTPATIENT
Start: 2017-02-21 | End: 2017-02-22

## 2017-02-21 RX ORDER — DIPHENHYDRAMINE HCL 25 MG
25 CAPSULE ORAL EVERY 6 HOURS PRN
Status: DISCONTINUED | OUTPATIENT
Start: 2017-02-21 | End: 2017-02-24 | Stop reason: HOSPADM

## 2017-02-21 RX ORDER — CHOLECALCIFEROL (VITAMIN D3) 125 MCG
1000 CAPSULE ORAL DAILY
Status: DISCONTINUED | OUTPATIENT
Start: 2017-02-22 | End: 2017-02-24 | Stop reason: HOSPADM

## 2017-02-21 RX ORDER — SUCRALFATE 1 G/1
1 TABLET ORAL 2 TIMES DAILY
Status: DISCONTINUED | OUTPATIENT
Start: 2017-02-21 | End: 2017-02-22

## 2017-02-21 RX ORDER — CHOLECALCIFEROL (VITAMIN D3) 125 MCG
5 CAPSULE ORAL NIGHTLY
Status: DISCONTINUED | OUTPATIENT
Start: 2017-02-21 | End: 2017-02-24 | Stop reason: HOSPADM

## 2017-02-21 RX ORDER — PROMETHAZINE HYDROCHLORIDE 25 MG/1
25 TABLET ORAL EVERY 6 HOURS PRN
Status: DISCONTINUED | OUTPATIENT
Start: 2017-02-21 | End: 2017-02-24 | Stop reason: HOSPADM

## 2017-02-21 RX ORDER — ONDANSETRON 2 MG/ML
4 INJECTION INTRAMUSCULAR; INTRAVENOUS ONCE
Status: COMPLETED | OUTPATIENT
Start: 2017-02-21 | End: 2017-02-21

## 2017-02-21 RX ORDER — MELATONIN
1000 DAILY
Status: DISCONTINUED | OUTPATIENT
Start: 2017-02-22 | End: 2017-02-24 | Stop reason: HOSPADM

## 2017-02-21 RX ORDER — NICOTINE POLACRILEX 4 MG
15 LOZENGE BUCCAL
Status: DISCONTINUED | OUTPATIENT
Start: 2017-02-21 | End: 2017-02-24 | Stop reason: HOSPADM

## 2017-02-21 RX ORDER — ALPRAZOLAM 0.5 MG/1
0.5 TABLET ORAL NIGHTLY PRN
Status: DISCONTINUED | OUTPATIENT
Start: 2017-02-21 | End: 2017-02-24 | Stop reason: HOSPADM

## 2017-02-21 RX ORDER — SODIUM CHLORIDE 0.9 % (FLUSH) 0.9 %
10 SYRINGE (ML) INJECTION AS NEEDED
Status: DISCONTINUED | OUTPATIENT
Start: 2017-02-21 | End: 2017-02-24 | Stop reason: HOSPADM

## 2017-02-21 RX ORDER — PANTOPRAZOLE SODIUM 40 MG/1
40 TABLET, DELAYED RELEASE ORAL DAILY
Status: DISCONTINUED | OUTPATIENT
Start: 2017-02-22 | End: 2017-02-24 | Stop reason: HOSPADM

## 2017-02-21 RX ORDER — CYCLOBENZAPRINE HCL 5 MG
5 TABLET ORAL 2 TIMES DAILY PRN
Status: DISCONTINUED | OUTPATIENT
Start: 2017-02-21 | End: 2017-02-24 | Stop reason: HOSPADM

## 2017-02-21 RX ORDER — CEFTRIAXONE SODIUM 2 G/50ML
2 INJECTION, SOLUTION INTRAVENOUS EVERY 24 HOURS
Status: DISCONTINUED | OUTPATIENT
Start: 2017-02-21 | End: 2017-02-23

## 2017-02-21 RX ORDER — SUCRALFATE 1 G/1
1 TABLET ORAL 2 TIMES DAILY
Status: DISCONTINUED | OUTPATIENT
Start: 2017-02-21 | End: 2017-02-24 | Stop reason: HOSPADM

## 2017-02-21 RX ORDER — METOCLOPRAMIDE 10 MG/1
10 TABLET ORAL
Status: DISCONTINUED | OUTPATIENT
Start: 2017-02-22 | End: 2017-02-24 | Stop reason: HOSPADM

## 2017-02-21 RX ORDER — DEXTROSE MONOHYDRATE 25 G/50ML
25 INJECTION, SOLUTION INTRAVENOUS
Status: DISCONTINUED | OUTPATIENT
Start: 2017-02-21 | End: 2017-02-24 | Stop reason: HOSPADM

## 2017-02-21 RX ORDER — DIPHENOXYLATE HYDROCHLORIDE AND ATROPINE SULFATE 2.5; .025 MG/1; MG/1
1 TABLET ORAL DAILY
Status: DISCONTINUED | OUTPATIENT
Start: 2017-02-22 | End: 2017-02-24 | Stop reason: HOSPADM

## 2017-02-21 RX ORDER — PREGABALIN 75 MG/1
75 CAPSULE ORAL EVERY 12 HOURS SCHEDULED
Status: DISCONTINUED | OUTPATIENT
Start: 2017-02-21 | End: 2017-02-24 | Stop reason: HOSPADM

## 2017-02-21 RX ORDER — SODIUM CHLORIDE 0.9 % (FLUSH) 0.9 %
1-10 SYRINGE (ML) INJECTION AS NEEDED
Status: DISCONTINUED | OUTPATIENT
Start: 2017-02-21 | End: 2017-02-24 | Stop reason: HOSPADM

## 2017-02-21 RX ORDER — ONDANSETRON 2 MG/ML
INJECTION INTRAMUSCULAR; INTRAVENOUS
Status: COMPLETED
Start: 2017-02-21 | End: 2017-02-21

## 2017-02-21 RX ORDER — CYCLOSPORINE 0.5 MG/ML
1 EMULSION OPHTHALMIC 2 TIMES DAILY
Status: DISCONTINUED | OUTPATIENT
Start: 2017-02-21 | End: 2017-02-24 | Stop reason: HOSPADM

## 2017-02-21 RX ORDER — FOLIC ACID 1 MG/1
1 TABLET ORAL DAILY
Status: DISCONTINUED | OUTPATIENT
Start: 2017-02-22 | End: 2017-02-24 | Stop reason: HOSPADM

## 2017-02-21 RX ORDER — LEVETIRACETAM 500 MG/1
500 TABLET ORAL 2 TIMES DAILY
Status: DISCONTINUED | OUTPATIENT
Start: 2017-02-21 | End: 2017-02-24 | Stop reason: HOSPADM

## 2017-02-21 RX ORDER — FERROUS SULFATE 325(65) MG
325 TABLET ORAL
Status: DISCONTINUED | OUTPATIENT
Start: 2017-02-22 | End: 2017-02-24 | Stop reason: HOSPADM

## 2017-02-21 RX ORDER — PRAVASTATIN SODIUM 40 MG
40 TABLET ORAL DAILY
Status: DISCONTINUED | OUTPATIENT
Start: 2017-02-22 | End: 2017-02-24 | Stop reason: HOSPADM

## 2017-02-21 RX ORDER — DOCUSATE SODIUM 100 MG/1
100 CAPSULE, LIQUID FILLED ORAL AS NEEDED
Status: DISCONTINUED | OUTPATIENT
Start: 2017-02-21 | End: 2017-02-24 | Stop reason: HOSPADM

## 2017-02-21 RX ADMIN — MORPHINE SULFATE 4 MG: 4 INJECTION, SOLUTION INTRAMUSCULAR; INTRAVENOUS at 13:30

## 2017-02-21 RX ADMIN — ONDANSETRON 4 MG: 2 INJECTION INTRAMUSCULAR; INTRAVENOUS at 17:31

## 2017-02-21 RX ADMIN — INSULIN ASPART 5 UNITS: 100 INJECTION, SOLUTION INTRAVENOUS; SUBCUTANEOUS at 20:49

## 2017-02-21 RX ADMIN — Medication 5 MG: at 20:50

## 2017-02-21 RX ADMIN — IOPAMIDOL 85 ML: 612 INJECTION, SOLUTION INTRAVENOUS at 14:10

## 2017-02-21 RX ADMIN — LEVETIRACETAM 500 MG: 500 TABLET, FILM COATED ORAL at 20:50

## 2017-02-21 RX ADMIN — INSULIN DETEMIR 80 UNITS: 100 INJECTION, SOLUTION SUBCUTANEOUS at 20:49

## 2017-02-21 RX ADMIN — RIFAXIMIN 550 MG: 550 TABLET ORAL at 20:50

## 2017-02-21 RX ADMIN — ALPRAZOLAM 0.5 MG: 0.5 TABLET ORAL at 22:09

## 2017-02-21 RX ADMIN — SODIUM CHLORIDE 125 ML/HR: 9 INJECTION, SOLUTION INTRAVENOUS at 22:10

## 2017-02-21 RX ADMIN — PREGABALIN 75 MG: 75 CAPSULE ORAL at 20:50

## 2017-02-21 RX ADMIN — ONDANSETRON 4 MG: 2 INJECTION INTRAMUSCULAR; INTRAVENOUS at 13:30

## 2017-02-21 RX ADMIN — MORPHINE SULFATE 4 MG: 4 INJECTION, SOLUTION INTRAMUSCULAR; INTRAVENOUS at 17:31

## 2017-02-21 RX ADMIN — SODIUM CHLORIDE 125 ML/HR: 9 INJECTION, SOLUTION INTRAVENOUS at 17:30

## 2017-02-21 RX ADMIN — Medication 4 MG: at 17:31

## 2017-02-21 RX ADMIN — SUCRALFATE 1 G: 1 TABLET ORAL at 20:50

## 2017-02-21 RX ADMIN — CEFTRIAXONE SODIUM 2 G: 2 INJECTION, SOLUTION INTRAVENOUS at 22:09

## 2017-02-21 RX ADMIN — SODIUM CHLORIDE 125 ML/HR: 9 INJECTION, SOLUTION INTRAVENOUS at 13:19

## 2017-02-21 NOTE — TELEPHONE ENCOUNTER
"Pt called, she is concerned that she is dizzy and is extremely bloated.  She is having BMs and no nausea/vomiting.  But she is also having intense pain on left side under ribs, described as pain level \"9\"     Discussed with Dr Coleman, advised pt to proceed to [facility] ER.  Pt verbalized understanding  "

## 2017-02-22 ENCOUNTER — APPOINTMENT (OUTPATIENT)
Dept: ULTRASOUND IMAGING | Facility: HOSPITAL | Age: 57
End: 2017-02-22
Attending: INTERNAL MEDICINE

## 2017-02-22 PROBLEM — K76.6 PORTAL HYPERTENSION (HCC): Status: ACTIVE | Noted: 2017-02-22

## 2017-02-22 PROBLEM — M32.9 SYSTEMIC LUPUS (HCC): Status: ACTIVE | Noted: 2017-02-22

## 2017-02-22 PROBLEM — I85.10 SECONDARY ESOPHAGEAL VARICES WITHOUT BLEEDING (HCC): Status: ACTIVE | Noted: 2017-02-22

## 2017-02-22 PROBLEM — K65.2 SBP (SPONTANEOUS BACTERIAL PERITONITIS) (HCC): Status: ACTIVE | Noted: 2017-02-22

## 2017-02-22 LAB
ALBUMIN SERPL-MCNC: 3 G/DL (ref 3.5–5.2)
ALBUMIN/GLOB SERPL: 1.3 G/DL
ALP SERPL-CCNC: 68 U/L (ref 39–117)
ALT SERPL W P-5'-P-CCNC: 28 U/L (ref 1–33)
ANION GAP SERPL CALCULATED.3IONS-SCNC: 11 MMOL/L
ANION GAP SERPL CALCULATED.3IONS-SCNC: 11 MMOL/L
APPEARANCE FLD: CLEAR
AST SERPL-CCNC: 25 U/L (ref 1–32)
BASOPHILS # BLD AUTO: 0.01 10*3/MM3 (ref 0–0.2)
BASOPHILS NFR BLD AUTO: 0.5 % (ref 0–1.5)
BILIRUB SERPL-MCNC: 0.4 MG/DL (ref 0.1–1.2)
BUN BLD-MCNC: 5 MG/DL (ref 6–20)
BUN BLD-MCNC: 5 MG/DL (ref 6–20)
BUN/CREAT SERPL: 7.9 (ref 7–25)
BUN/CREAT SERPL: 7.9 (ref 7–25)
CALCIUM SPEC-SCNC: 8.1 MG/DL (ref 8.6–10.5)
CALCIUM SPEC-SCNC: 8.1 MG/DL (ref 8.6–10.5)
CHLORIDE SERPL-SCNC: 104 MMOL/L (ref 98–107)
CHLORIDE SERPL-SCNC: 104 MMOL/L (ref 98–107)
CO2 SERPL-SCNC: 24 MMOL/L (ref 22–29)
CO2 SERPL-SCNC: 24 MMOL/L (ref 22–29)
COLOR FLD: YELLOW
CREAT BLD-MCNC: 0.63 MG/DL (ref 0.57–1)
CREAT BLD-MCNC: 0.63 MG/DL (ref 0.57–1)
DEPRECATED RDW RBC AUTO: 53.3 FL (ref 37–54)
EOSINOPHIL # BLD AUTO: 0.05 10*3/MM3 (ref 0–0.7)
EOSINOPHIL NFR BLD AUTO: 2.3 % (ref 0.3–6.2)
EOSINOPHIL NFR FLD MANUAL: 2 %
ERYTHROCYTE [DISTWIDTH] IN BLOOD BY AUTOMATED COUNT: 17.1 % (ref 11.7–13)
GFR SERPL CREATININE-BSD FRML MDRD: 98 ML/MIN/1.73
GFR SERPL CREATININE-BSD FRML MDRD: 98 ML/MIN/1.73
GLOBULIN UR ELPH-MCNC: 2.4 GM/DL
GLUCOSE BLD-MCNC: 236 MG/DL (ref 65–99)
GLUCOSE BLD-MCNC: 236 MG/DL (ref 65–99)
GLUCOSE BLDC GLUCOMTR-MCNC: 219 MG/DL (ref 70–130)
GLUCOSE BLDC GLUCOMTR-MCNC: 226 MG/DL (ref 70–130)
GLUCOSE BLDC GLUCOMTR-MCNC: 249 MG/DL (ref 70–130)
GLUCOSE BLDC GLUCOMTR-MCNC: 272 MG/DL (ref 70–130)
HBA1C MFR BLD: 6.2 % (ref 4.8–5.6)
HCT VFR BLD AUTO: 23.7 % (ref 35.6–45.5)
HGB BLD-MCNC: 7.6 G/DL (ref 11.9–15.5)
IMM GRANULOCYTES # BLD: 0.03 10*3/MM3 (ref 0–0.03)
IMM GRANULOCYTES NFR BLD: 1.4 % (ref 0–0.5)
LYMPHOCYTES # BLD AUTO: 0.74 10*3/MM3 (ref 0.9–4.8)
LYMPHOCYTES NFR BLD AUTO: 34.3 % (ref 19.6–45.3)
LYMPHOCYTES NFR FLD MANUAL: 32 %
MCH RBC QN AUTO: 27.5 PG (ref 26.9–32)
MCHC RBC AUTO-ENTMCNC: 32.1 G/DL (ref 32.4–36.3)
MCV RBC AUTO: 85.9 FL (ref 80.5–98.2)
METHOD: NORMAL
MONOCYTES # BLD AUTO: 0.21 10*3/MM3 (ref 0.2–1.2)
MONOCYTES NFR BLD AUTO: 9.7 % (ref 5–12)
MONOCYTES NFR FLD: 27 %
MONOS+MACROS NFR FLD: 32 %
NEUTROPHILS # BLD AUTO: 1.12 10*3/MM3 (ref 1.9–8.1)
NEUTROPHILS NFR BLD AUTO: 51.8 % (ref 42.7–76)
NEUTROPHILS NFR FLD MANUAL: 7 %
NRBC BLD MANUAL-RTO: 0 /100 WBC (ref 0–0)
NUC CELL # FLD: 149 /MM3
PLATELET # BLD AUTO: 84 10*3/MM3 (ref 140–500)
PMV BLD AUTO: 10.4 FL (ref 6–12)
POTASSIUM BLD-SCNC: 3.9 MMOL/L (ref 3.5–5.2)
POTASSIUM BLD-SCNC: 3.9 MMOL/L (ref 3.5–5.2)
PROT SERPL-MCNC: 5.4 G/DL (ref 6–8.5)
RBC # BLD AUTO: 2.76 10*6/MM3 (ref 3.9–5.2)
RBC # FLD AUTO: 10 /MM3
SODIUM BLD-SCNC: 139 MMOL/L (ref 136–145)
SODIUM BLD-SCNC: 139 MMOL/L (ref 136–145)
WBC NRBC COR # BLD: 2.16 10*3/MM3 (ref 4.5–10.7)

## 2017-02-22 PROCEDURE — 0W9G3ZZ DRAINAGE OF PERITONEAL CAVITY, PERCUTANEOUS APPROACH: ICD-10-PCS | Performed by: RADIOLOGY

## 2017-02-22 PROCEDURE — 99223 1ST HOSP IP/OBS HIGH 75: CPT | Performed by: INTERNAL MEDICINE

## 2017-02-22 PROCEDURE — 63710000001 INSULIN DETEMER PER 5 UNITS: Performed by: INTERNAL MEDICINE

## 2017-02-22 PROCEDURE — 87015 SPECIMEN INFECT AGNT CONCNTJ: CPT | Performed by: HOSPITALIST

## 2017-02-22 PROCEDURE — 63710000001 INSULIN ASPART PER 5 UNITS: Performed by: INTERNAL MEDICINE

## 2017-02-22 PROCEDURE — 25010000003 CEFTRIAXONE PER 250 MG: Performed by: INTERNAL MEDICINE

## 2017-02-22 PROCEDURE — 89051 BODY FLUID CELL COUNT: CPT | Performed by: HOSPITALIST

## 2017-02-22 PROCEDURE — 80053 COMPREHEN METABOLIC PANEL: CPT | Performed by: INTERNAL MEDICINE

## 2017-02-22 PROCEDURE — 87070 CULTURE OTHR SPECIMN AEROBIC: CPT | Performed by: HOSPITALIST

## 2017-02-22 PROCEDURE — 80048 BASIC METABOLIC PNL TOTAL CA: CPT | Performed by: INTERNAL MEDICINE

## 2017-02-22 PROCEDURE — 85025 COMPLETE CBC W/AUTO DIFF WBC: CPT | Performed by: INTERNAL MEDICINE

## 2017-02-22 PROCEDURE — 76942 ECHO GUIDE FOR BIOPSY: CPT

## 2017-02-22 PROCEDURE — 87205 SMEAR GRAM STAIN: CPT | Performed by: HOSPITALIST

## 2017-02-22 PROCEDURE — 82962 GLUCOSE BLOOD TEST: CPT

## 2017-02-22 PROCEDURE — 83036 HEMOGLOBIN GLYCOSYLATED A1C: CPT | Performed by: INTERNAL MEDICINE

## 2017-02-22 RX ORDER — ALPRAZOLAM 0.5 MG/1
0.5 TABLET ORAL ONCE
Status: COMPLETED | OUTPATIENT
Start: 2017-02-22 | End: 2017-02-22

## 2017-02-22 RX ORDER — LIDOCAINE WITH 8.4% SOD BICARB 0.9%(10ML)
20 SYRINGE (ML) INJECTION ONCE
Status: COMPLETED | OUTPATIENT
Start: 2017-02-22 | End: 2017-02-22

## 2017-02-22 RX ORDER — SPIRONOLACTONE 100 MG/1
100 TABLET, FILM COATED ORAL DAILY
Status: DISCONTINUED | OUTPATIENT
Start: 2017-02-22 | End: 2017-02-24 | Stop reason: HOSPADM

## 2017-02-22 RX ADMIN — SODIUM CHLORIDE 125 ML/HR: 9 INJECTION, SOLUTION INTRAVENOUS at 05:41

## 2017-02-22 RX ADMIN — LEVETIRACETAM 500 MG: 500 TABLET, FILM COATED ORAL at 17:52

## 2017-02-22 RX ADMIN — FERROUS SULFATE TAB 325 MG (65 MG ELEMENTAL FE) 325 MG: 325 (65 FE) TAB at 08:55

## 2017-02-22 RX ADMIN — Medication 1 TABLET: at 08:54

## 2017-02-22 RX ADMIN — CEFTRIAXONE SODIUM 2 G: 2 INJECTION, SOLUTION INTRAVENOUS at 20:52

## 2017-02-22 RX ADMIN — Medication 1000 MCG: at 08:54

## 2017-02-22 RX ADMIN — Medication 10 ML: at 10:48

## 2017-02-22 RX ADMIN — PREGABALIN 75 MG: 75 CAPSULE ORAL at 20:53

## 2017-02-22 RX ADMIN — PRAVASTATIN SODIUM 40 MG: 40 TABLET ORAL at 08:54

## 2017-02-22 RX ADMIN — METOCLOPRAMIDE 10 MG: 10 TABLET ORAL at 17:52

## 2017-02-22 RX ADMIN — LEVETIRACETAM 500 MG: 500 TABLET, FILM COATED ORAL at 08:54

## 2017-02-22 RX ADMIN — FOLIC ACID 1 MG: 1 TABLET ORAL at 08:54

## 2017-02-22 RX ADMIN — ALPRAZOLAM 0.5 MG: 0.5 TABLET ORAL at 20:53

## 2017-02-22 RX ADMIN — PANTOPRAZOLE SODIUM 40 MG: 40 TABLET, DELAYED RELEASE ORAL at 08:54

## 2017-02-22 RX ADMIN — Medication 5 MG: at 20:53

## 2017-02-22 RX ADMIN — METOCLOPRAMIDE 10 MG: 10 TABLET ORAL at 08:53

## 2017-02-22 RX ADMIN — CYCLOSPORINE 1 DROP: 0.5 EMULSION OPHTHALMIC at 17:52

## 2017-02-22 RX ADMIN — INSULIN ASPART 8 UNITS: 100 INJECTION, SOLUTION INTRAVENOUS; SUBCUTANEOUS at 20:52

## 2017-02-22 RX ADMIN — PREGABALIN 75 MG: 75 CAPSULE ORAL at 08:54

## 2017-02-22 RX ADMIN — SUCRALFATE 1 G: 1 TABLET ORAL at 17:52

## 2017-02-22 RX ADMIN — INSULIN ASPART 5 UNITS: 100 INJECTION, SOLUTION INTRAVENOUS; SUBCUTANEOUS at 17:52

## 2017-02-22 RX ADMIN — METFORMIN HYDROCHLORIDE 1000 MG: 1000 TABLET ORAL at 08:55

## 2017-02-22 RX ADMIN — CYCLOBENZAPRINE HYDROCHLORIDE 5 MG: 5 TABLET, FILM COATED ORAL at 16:17

## 2017-02-22 RX ADMIN — RIFAXIMIN 550 MG: 550 TABLET ORAL at 08:54

## 2017-02-22 RX ADMIN — DULOXETINE HYDROCHLORIDE 90 MG: 60 CAPSULE, DELAYED RELEASE ORAL at 08:54

## 2017-02-22 RX ADMIN — RIFAXIMIN 550 MG: 550 TABLET ORAL at 20:53

## 2017-02-22 RX ADMIN — ALPRAZOLAM 0.5 MG: 0.5 TABLET ORAL at 10:05

## 2017-02-22 RX ADMIN — INSULIN ASPART 5 UNITS: 100 INJECTION, SOLUTION INTRAVENOUS; SUBCUTANEOUS at 12:09

## 2017-02-22 RX ADMIN — INSULIN DETEMIR 80 UNITS: 100 INJECTION, SOLUTION SUBCUTANEOUS at 20:53

## 2017-02-22 RX ADMIN — INSULIN ASPART 5 UNITS: 100 INJECTION, SOLUTION INTRAVENOUS; SUBCUTANEOUS at 08:53

## 2017-02-22 RX ADMIN — SUCRALFATE 1 G: 1 TABLET ORAL at 08:54

## 2017-02-22 RX ADMIN — SPIRONOLACTONE 100 MG: 100 TABLET, FILM COATED ORAL at 16:17

## 2017-02-22 RX ADMIN — CYCLOSPORINE 1 DROP: 0.5 EMULSION OPHTHALMIC at 08:55

## 2017-02-22 RX ADMIN — CYCLOBENZAPRINE HYDROCHLORIDE 5 MG: 5 TABLET, FILM COATED ORAL at 00:28

## 2017-02-22 RX ADMIN — METOCLOPRAMIDE 10 MG: 10 TABLET ORAL at 12:09

## 2017-02-22 RX ADMIN — VITAMIN D, TAB 1000IU (100/BT) 1000 UNITS: 25 TAB at 08:54

## 2017-02-23 ENCOUNTER — APPOINTMENT (OUTPATIENT)
Dept: GENERAL RADIOLOGY | Facility: HOSPITAL | Age: 57
End: 2017-02-23
Attending: INTERNAL MEDICINE

## 2017-02-23 LAB
ABO GROUP BLD: NORMAL
ALBUMIN SERPL-MCNC: 2.7 G/DL (ref 3.5–5.2)
ALBUMIN/GLOB SERPL: 1.1 G/DL
ALP SERPL-CCNC: 60 U/L (ref 39–117)
ALT SERPL W P-5'-P-CCNC: 25 U/L (ref 1–33)
ANION GAP SERPL CALCULATED.3IONS-SCNC: 12.4 MMOL/L
AST SERPL-CCNC: 18 U/L (ref 1–32)
BACTERIA SPEC AEROBE CULT: ABNORMAL
BILIRUB SERPL-MCNC: 0.4 MG/DL (ref 0.1–1.2)
BLD GP AB SCN SERPL QL: NEGATIVE
BUN BLD-MCNC: 5 MG/DL (ref 6–20)
BUN/CREAT SERPL: 8.1 (ref 7–25)
CALCIUM SPEC-SCNC: 8.4 MG/DL (ref 8.6–10.5)
CHLORIDE SERPL-SCNC: 103 MMOL/L (ref 98–107)
CO2 SERPL-SCNC: 25.6 MMOL/L (ref 22–29)
CREAT BLD-MCNC: 0.62 MG/DL (ref 0.57–1)
DEPRECATED RDW RBC AUTO: 52.8 FL (ref 37–54)
ERYTHROCYTE [DISTWIDTH] IN BLOOD BY AUTOMATED COUNT: 16.4 % (ref 11.7–13)
GFR SERPL CREATININE-BSD FRML MDRD: 100 ML/MIN/1.73
GLOBULIN UR ELPH-MCNC: 2.4 GM/DL
GLUCOSE BLD-MCNC: 203 MG/DL (ref 65–99)
GLUCOSE BLDC GLUCOMTR-MCNC: 128 MG/DL (ref 70–130)
GLUCOSE BLDC GLUCOMTR-MCNC: 144 MG/DL (ref 70–130)
GLUCOSE BLDC GLUCOMTR-MCNC: 237 MG/DL (ref 70–130)
GLUCOSE BLDC GLUCOMTR-MCNC: 320 MG/DL (ref 70–130)
HCT VFR BLD AUTO: 23.4 % (ref 35.6–45.5)
HGB BLD-MCNC: 7.4 G/DL (ref 11.9–15.5)
MCH RBC QN AUTO: 27.9 PG (ref 26.9–32)
MCHC RBC AUTO-ENTMCNC: 31.6 G/DL (ref 32.4–36.3)
MCV RBC AUTO: 88.3 FL (ref 80.5–98.2)
PLATELET # BLD AUTO: 75 10*3/MM3 (ref 140–500)
PMV BLD AUTO: 10.9 FL (ref 6–12)
POTASSIUM BLD-SCNC: 3.6 MMOL/L (ref 3.5–5.2)
PROT SERPL-MCNC: 5.1 G/DL (ref 6–8.5)
RBC # BLD AUTO: 2.65 10*6/MM3 (ref 3.9–5.2)
RH BLD: POSITIVE
SODIUM BLD-SCNC: 141 MMOL/L (ref 136–145)
WBC NRBC COR # BLD: 2.1 10*3/MM3 (ref 4.5–10.7)

## 2017-02-23 PROCEDURE — 85027 COMPLETE CBC AUTOMATED: CPT | Performed by: HOSPITALIST

## 2017-02-23 PROCEDURE — 86900 BLOOD TYPING SEROLOGIC ABO: CPT

## 2017-02-23 PROCEDURE — 36430 TRANSFUSION BLD/BLD COMPNT: CPT

## 2017-02-23 PROCEDURE — 86850 RBC ANTIBODY SCREEN: CPT

## 2017-02-23 PROCEDURE — 99232 SBSQ HOSP IP/OBS MODERATE 35: CPT | Performed by: INTERNAL MEDICINE

## 2017-02-23 PROCEDURE — 71020 HC CHEST PA AND LATERAL: CPT

## 2017-02-23 PROCEDURE — 25010000002 FUROSEMIDE PER 20 MG: Performed by: HOSPITALIST

## 2017-02-23 PROCEDURE — 30233N1 TRANSFUSION OF NONAUTOLOGOUS RED BLOOD CELLS INTO PERIPHERAL VEIN, PERCUTANEOUS APPROACH: ICD-10-PCS | Performed by: HOSPITALIST

## 2017-02-23 PROCEDURE — 86901 BLOOD TYPING SEROLOGIC RH(D): CPT

## 2017-02-23 PROCEDURE — 86923 COMPATIBILITY TEST ELECTRIC: CPT

## 2017-02-23 PROCEDURE — 80053 COMPREHEN METABOLIC PANEL: CPT | Performed by: HOSPITALIST

## 2017-02-23 PROCEDURE — P9016 RBC LEUKOCYTES REDUCED: HCPCS

## 2017-02-23 PROCEDURE — 63710000001 INSULIN ASPART PER 5 UNITS: Performed by: HOSPITALIST

## 2017-02-23 PROCEDURE — 82962 GLUCOSE BLOOD TEST: CPT

## 2017-02-23 PROCEDURE — 63710000001 INSULIN ASPART PER 5 UNITS: Performed by: INTERNAL MEDICINE

## 2017-02-23 RX ORDER — FUROSEMIDE 10 MG/ML
20 INJECTION INTRAMUSCULAR; INTRAVENOUS ONCE
Status: COMPLETED | OUTPATIENT
Start: 2017-02-23 | End: 2017-02-23

## 2017-02-23 RX ORDER — POTASSIUM CHLORIDE 750 MG/1
20 CAPSULE, EXTENDED RELEASE ORAL ONCE
Status: COMPLETED | OUTPATIENT
Start: 2017-02-23 | End: 2017-02-23

## 2017-02-23 RX ADMIN — INSULIN ASPART 5 UNITS: 100 INJECTION, SOLUTION INTRAVENOUS; SUBCUTANEOUS at 08:35

## 2017-02-23 RX ADMIN — METOCLOPRAMIDE 10 MG: 10 TABLET ORAL at 12:18

## 2017-02-23 RX ADMIN — CYCLOSPORINE 1 DROP: 0.5 EMULSION OPHTHALMIC at 08:35

## 2017-02-23 RX ADMIN — Medication 5 MG: at 20:54

## 2017-02-23 RX ADMIN — LEVETIRACETAM 500 MG: 500 TABLET, FILM COATED ORAL at 17:53

## 2017-02-23 RX ADMIN — FERROUS SULFATE TAB 325 MG (65 MG ELEMENTAL FE) 325 MG: 325 (65 FE) TAB at 08:34

## 2017-02-23 RX ADMIN — CYCLOBENZAPRINE HYDROCHLORIDE 5 MG: 5 TABLET, FILM COATED ORAL at 21:28

## 2017-02-23 RX ADMIN — PREGABALIN 75 MG: 75 CAPSULE ORAL at 08:34

## 2017-02-23 RX ADMIN — Medication 1000 MCG: at 08:34

## 2017-02-23 RX ADMIN — FUROSEMIDE 20 MG: 10 INJECTION, SOLUTION INTRAMUSCULAR; INTRAVENOUS at 18:55

## 2017-02-23 RX ADMIN — RIFAXIMIN 550 MG: 550 TABLET ORAL at 20:54

## 2017-02-23 RX ADMIN — DULOXETINE HYDROCHLORIDE 90 MG: 60 CAPSULE, DELAYED RELEASE ORAL at 08:34

## 2017-02-23 RX ADMIN — PANTOPRAZOLE SODIUM 40 MG: 40 TABLET, DELAYED RELEASE ORAL at 08:35

## 2017-02-23 RX ADMIN — SPIRONOLACTONE 100 MG: 100 TABLET, FILM COATED ORAL at 08:34

## 2017-02-23 RX ADMIN — Medication 1 TABLET: at 08:34

## 2017-02-23 RX ADMIN — FOLIC ACID 1 MG: 1 TABLET ORAL at 08:34

## 2017-02-23 RX ADMIN — LEVETIRACETAM 500 MG: 500 TABLET, FILM COATED ORAL at 08:34

## 2017-02-23 RX ADMIN — INSULIN ASPART 40 UNITS: 100 INJECTION, SOLUTION INTRAVENOUS; SUBCUTANEOUS at 17:53

## 2017-02-23 RX ADMIN — INSULIN ASPART 40 UNITS: 100 INJECTION, SOLUTION INTRAVENOUS; SUBCUTANEOUS at 13:04

## 2017-02-23 RX ADMIN — CYCLOSPORINE 1 DROP: 0.5 EMULSION OPHTHALMIC at 17:53

## 2017-02-23 RX ADMIN — INSULIN DETEMIR 80 UNITS: 100 INJECTION, SOLUTION SUBCUTANEOUS at 20:53

## 2017-02-23 RX ADMIN — POTASSIUM CHLORIDE 20 MEQ: 750 CAPSULE, EXTENDED RELEASE ORAL at 13:04

## 2017-02-23 RX ADMIN — ALPRAZOLAM 0.5 MG: 0.5 TABLET ORAL at 20:54

## 2017-02-23 RX ADMIN — INSULIN ASPART 10 UNITS: 100 INJECTION, SOLUTION INTRAVENOUS; SUBCUTANEOUS at 12:17

## 2017-02-23 RX ADMIN — METOCLOPRAMIDE 10 MG: 10 TABLET ORAL at 08:34

## 2017-02-23 RX ADMIN — PRAVASTATIN SODIUM 40 MG: 40 TABLET ORAL at 08:34

## 2017-02-23 RX ADMIN — SUCRALFATE 1 G: 1 TABLET ORAL at 17:53

## 2017-02-23 RX ADMIN — METOCLOPRAMIDE 10 MG: 10 TABLET ORAL at 17:53

## 2017-02-23 RX ADMIN — PREGABALIN 75 MG: 75 CAPSULE ORAL at 20:54

## 2017-02-23 RX ADMIN — RIFAXIMIN 550 MG: 550 TABLET ORAL at 08:34

## 2017-02-23 RX ADMIN — SUCRALFATE 1 G: 1 TABLET ORAL at 08:34

## 2017-02-23 RX ADMIN — VITAMIN D, TAB 1000IU (100/BT) 1000 UNITS: 25 TAB at 08:34

## 2017-02-24 VITALS
BODY MASS INDEX: 25.69 KG/M2 | SYSTOLIC BLOOD PRESSURE: 111 MMHG | RESPIRATION RATE: 18 BRPM | HEIGHT: 67 IN | DIASTOLIC BLOOD PRESSURE: 75 MMHG | HEART RATE: 96 BPM | OXYGEN SATURATION: 94 % | WEIGHT: 163.7 LBS | TEMPERATURE: 98.3 F

## 2017-02-24 PROBLEM — R10.84 GENERALIZED ABDOMINAL PAIN: Status: RESOLVED | Noted: 2017-02-14 | Resolved: 2017-02-24

## 2017-02-24 LAB
ABO + RH BLD: NORMAL
ABO + RH BLD: NORMAL
ANION GAP SERPL CALCULATED.3IONS-SCNC: 13.9 MMOL/L
BH BB BLOOD EXPIRATION DATE: NORMAL
BH BB BLOOD EXPIRATION DATE: NORMAL
BH BB BLOOD TYPE BARCODE: 6200
BH BB BLOOD TYPE BARCODE: 6200
BH BB DISPENSE STATUS: NORMAL
BH BB DISPENSE STATUS: NORMAL
BH BB PRODUCT CODE: NORMAL
BH BB PRODUCT CODE: NORMAL
BH BB UNIT NUMBER: NORMAL
BH BB UNIT NUMBER: NORMAL
BUN BLD-MCNC: 7 MG/DL (ref 6–20)
BUN/CREAT SERPL: 10.4 (ref 7–25)
CALCIUM SPEC-SCNC: 8.6 MG/DL (ref 8.6–10.5)
CHLORIDE SERPL-SCNC: 101 MMOL/L (ref 98–107)
CO2 SERPL-SCNC: 25.1 MMOL/L (ref 22–29)
CREAT BLD-MCNC: 0.67 MG/DL (ref 0.57–1)
DEPRECATED RDW RBC AUTO: 49.5 FL (ref 37–54)
ERYTHROCYTE [DISTWIDTH] IN BLOOD BY AUTOMATED COUNT: 16 % (ref 11.7–13)
GFR SERPL CREATININE-BSD FRML MDRD: 92 ML/MIN/1.73
GLUCOSE BLD-MCNC: 200 MG/DL (ref 65–99)
GLUCOSE BLDC GLUCOMTR-MCNC: 205 MG/DL (ref 70–130)
HCT VFR BLD AUTO: 29.7 % (ref 35.6–45.5)
HGB BLD-MCNC: 9.7 G/DL (ref 11.9–15.5)
MCH RBC QN AUTO: 28.2 PG (ref 26.9–32)
MCHC RBC AUTO-ENTMCNC: 32.7 G/DL (ref 32.4–36.3)
MCV RBC AUTO: 86.3 FL (ref 80.5–98.2)
PLATELET # BLD AUTO: 76 10*3/MM3 (ref 140–500)
PMV BLD AUTO: 11 FL (ref 6–12)
POTASSIUM BLD-SCNC: 3.8 MMOL/L (ref 3.5–5.2)
RBC # BLD AUTO: 3.44 10*6/MM3 (ref 3.9–5.2)
SODIUM BLD-SCNC: 140 MMOL/L (ref 136–145)
UNIT  ABO: NORMAL
UNIT  ABO: NORMAL
UNIT  RH: NORMAL
UNIT  RH: NORMAL
WBC NRBC COR # BLD: 2.39 10*3/MM3 (ref 4.5–10.7)

## 2017-02-24 PROCEDURE — 63710000001 INSULIN ASPART PER 5 UNITS: Performed by: HOSPITALIST

## 2017-02-24 PROCEDURE — 80048 BASIC METABOLIC PNL TOTAL CA: CPT | Performed by: HOSPITALIST

## 2017-02-24 PROCEDURE — 85027 COMPLETE CBC AUTOMATED: CPT | Performed by: HOSPITALIST

## 2017-02-24 PROCEDURE — 82962 GLUCOSE BLOOD TEST: CPT

## 2017-02-24 PROCEDURE — 63710000001 INSULIN ASPART PER 5 UNITS: Performed by: INTERNAL MEDICINE

## 2017-02-24 RX ORDER — SPIRONOLACTONE 100 MG/1
100 TABLET, FILM COATED ORAL DAILY
Qty: 30 TABLET | Refills: 0 | Status: SHIPPED | OUTPATIENT
Start: 2017-02-24 | End: 2018-04-18 | Stop reason: SDUPTHER

## 2017-02-24 RX ADMIN — INSULIN ASPART 40 UNITS: 100 INJECTION, SOLUTION INTRAVENOUS; SUBCUTANEOUS at 08:41

## 2017-02-24 RX ADMIN — METOCLOPRAMIDE 10 MG: 10 TABLET ORAL at 08:41

## 2017-02-24 RX ADMIN — INSULIN ASPART 5 UNITS: 100 INJECTION, SOLUTION INTRAVENOUS; SUBCUTANEOUS at 08:41

## 2017-02-26 LAB — BACTERIA SPEC AEROBE CULT: NORMAL

## 2017-02-27 LAB
BACTERIA FLD CULT: NORMAL
GRAM STN SPEC: NORMAL
GRAM STN SPEC: NORMAL

## 2017-03-03 ENCOUNTER — OFFICE VISIT (OUTPATIENT)
Dept: INTERNAL MEDICINE | Facility: CLINIC | Age: 57
End: 2017-03-03

## 2017-03-03 VITALS
HEIGHT: 67 IN | DIASTOLIC BLOOD PRESSURE: 72 MMHG | WEIGHT: 177 LBS | RESPIRATION RATE: 14 BRPM | BODY MASS INDEX: 27.78 KG/M2 | SYSTOLIC BLOOD PRESSURE: 120 MMHG

## 2017-03-03 DIAGNOSIS — K92.2 UGI BLEED: Primary | ICD-10-CM

## 2017-03-03 DIAGNOSIS — R25.2 MUSCLE CRAMPS: ICD-10-CM

## 2017-03-03 DIAGNOSIS — F41.9 ANXIETY: ICD-10-CM

## 2017-03-03 LAB
ERYTHROCYTE [DISTWIDTH] IN BLOOD BY AUTOMATED COUNT: 15.9 % (ref 11.7–13)
HCT VFR BLD AUTO: 39.2 % (ref 35.6–45.5)
HGB BLD-MCNC: 12.3 G/DL (ref 11.9–15.5)
MCH RBC QN AUTO: 27.7 PG (ref 26.9–32)
MCHC RBC AUTO-ENTMCNC: 31.4 G/DL (ref 32.4–36.3)
MCV RBC AUTO: 88.3 FL (ref 80.5–98.2)
PLATELET # BLD AUTO: 120 10*3/MM3 (ref 140–500)
RBC # BLD AUTO: 4.44 10*6/MM3 (ref 3.9–5.2)
WBC # BLD AUTO: 3.79 10*3/MM3 (ref 4.5–10.7)

## 2017-03-03 PROCEDURE — 99213 OFFICE O/P EST LOW 20 MIN: CPT | Performed by: INTERNAL MEDICINE

## 2017-03-03 RX ORDER — CYCLOBENZAPRINE HCL 5 MG
5 TABLET ORAL 2 TIMES DAILY PRN
Qty: 30 TABLET | Refills: 2 | Status: SHIPPED | OUTPATIENT
Start: 2017-03-03 | End: 2017-06-26 | Stop reason: SDUPTHER

## 2017-03-03 RX ORDER — ALPRAZOLAM 0.5 MG/1
0.5 TABLET ORAL 2 TIMES DAILY PRN
Qty: 60 TABLET | Refills: 2 | Status: SHIPPED | OUTPATIENT
Start: 2017-03-03 | End: 2017-09-28 | Stop reason: SDUPTHER

## 2017-03-03 NOTE — PROGRESS NOTES
"Chief Complaint   Patient presents with   • Follow-up     gi bleed, ascites        Subjective   Silvia Zabala is a 56 y.o. female     HPI:  She has had two recent hospitalizations.  One for upper gi bleed and then ascites.  She required EGD, banding of esophageal varices, transfusion, peritoneal tap.  Energy is down, appetite is fair. She still has bilateral flank pain.  No problems urinating. Bowel habits are better - previously had \"hard\" bowel movements, they are softer now and do not look black or maroon.  She has a bowel movement about 4 times a day.  She sleeps OK with xanax. She does not think she would be able to sleep without it due to arthritis pain, abdominal pain.    The following portions of the patient's history were reviewed and updated as appropriate: allergies, current medications, past social history, problem list, past surgical history    Review of Systems   Constitutional: Positive for appetite change and fatigue. Negative for chills and fever.   Respiratory: Negative for cough and shortness of breath.    Cardiovascular: Negative for chest pain, palpitations and leg swelling.   Genitourinary: Positive for flank pain. Negative for dysuria and frequency.   Musculoskeletal: Positive for arthralgias.       Visit Vitals   • /72 (BP Location: Left arm, Patient Position: Sitting, Cuff Size: Adult)   • Resp 14   • Ht 67\" (170.2 cm)   • Wt 177 lb (80.3 kg)   • BMI 27.72 kg/m2        Objective   Physical Exam   Constitutional: She is oriented to person, place, and time. She appears well-developed and well-nourished. No distress.   Eyes: No scleral icterus.   Neck: Normal carotid pulses present. Carotid bruit is not present.   Cardiovascular: Normal rate, regular rhythm, S1 normal, S2 normal and intact distal pulses.  Exam reveals no gallop and no friction rub.    No murmur heard.  Pulses:       Carotid pulses are 2+ on the right side, and 2+ on the left side.  Pulmonary/Chest: Effort normal and " breath sounds normal. No respiratory distress. She has no wheezes. She has no rhonchi. She has no rales. Chest wall is not dull to percussion.   Abdominal: Soft. She exhibits no distension. There is no tenderness. There is no rebound and no guarding.   Musculoskeletal: She exhibits no edema.   Neurological: She is alert and oriented to person, place, and time.   Skin: Skin is warm and dry.   Nursing note and vitals reviewed.      Assessment/Plan   Problem List Items Addressed This Visit        Digestive    UGI bleed - Primary    Relevant Orders    CBC (No Diff) (Completed)      Other Visit Diagnoses     Anxiety        Relevant Medications    ALPRAZolam (XANAX) 0.5 MG tablet    Muscle cramps        Relevant Medications    cyclobenzaprine (FLEXERIL) 5 MG tablet

## 2017-03-06 ENCOUNTER — TELEPHONE (OUTPATIENT)
Dept: INTERNAL MEDICINE | Facility: CLINIC | Age: 57
End: 2017-03-06

## 2017-03-06 NOTE — TELEPHONE ENCOUNTER
----- Message from Gilbert Allen sent at 3/6/2017 11:00 AM EST -----  Contact: pt  Pt calling would like a call back to discuss lab results. Please return call to pt.     #[phone]

## 2017-03-07 ENCOUNTER — OFFICE VISIT (OUTPATIENT)
Dept: GASTROENTEROLOGY | Facility: CLINIC | Age: 57
End: 2017-03-07

## 2017-03-07 ENCOUNTER — LAB (OUTPATIENT)
Dept: ENDOCRINOLOGY | Age: 57
End: 2017-03-07

## 2017-03-07 VITALS
WEIGHT: 175 LBS | SYSTOLIC BLOOD PRESSURE: 125 MMHG | DIASTOLIC BLOOD PRESSURE: 73 MMHG | BODY MASS INDEX: 27.47 KG/M2 | HEIGHT: 67 IN | HEART RATE: 97 BPM

## 2017-03-07 DIAGNOSIS — K75.81 NASH (NONALCOHOLIC STEATOHEPATITIS): ICD-10-CM

## 2017-03-07 DIAGNOSIS — K74.60 CIRRHOSIS OF LIVER WITHOUT ASCITES, UNSPECIFIED HEPATIC CIRRHOSIS TYPE (HCC): ICD-10-CM

## 2017-03-07 DIAGNOSIS — I85.10 SECONDARY ESOPHAGEAL VARICES WITHOUT BLEEDING (HCC): ICD-10-CM

## 2017-03-07 DIAGNOSIS — IMO0002 TYPE 2 DIABETES MELLITUS, UNCONTROLLED, WITH NEUROPATHY: ICD-10-CM

## 2017-03-07 DIAGNOSIS — K76.6 PORTAL HYPERTENSION (HCC): ICD-10-CM

## 2017-03-07 DIAGNOSIS — I85.11 SECONDARY ESOPHAGEAL VARICES WITH BLEEDING (HCC): Primary | ICD-10-CM

## 2017-03-07 LAB
HBA1C MFR BLD: 5.8 % (ref 4.8–5.6)
TSH SERPL DL<=0.005 MIU/L-ACNC: 3.98 MIU/ML (ref 0.27–4.2)

## 2017-03-07 PROCEDURE — 99213 OFFICE O/P EST LOW 20 MIN: CPT | Performed by: INTERNAL MEDICINE

## 2017-03-07 RX ORDER — SODIUM CHLORIDE 0.9 % (FLUSH) 0.9 %
1-10 SYRINGE (ML) INJECTION AS NEEDED
Status: CANCELLED | OUTPATIENT
Start: 2017-03-07

## 2017-03-07 RX ORDER — SODIUM CHLORIDE, SODIUM LACTATE, POTASSIUM CHLORIDE, CALCIUM CHLORIDE 600; 310; 30; 20 MG/100ML; MG/100ML; MG/100ML; MG/100ML
30 INJECTION, SOLUTION INTRAVENOUS CONTINUOUS
Status: CANCELLED | OUTPATIENT
Start: 2017-03-07

## 2017-03-07 NOTE — PROGRESS NOTES
Subjective   Silvia Zabala is a 56 y.o. female is here today for follow-up.  Chief Complaint   Patient presents with   • Cirrhosis     History of Present Illness  Last couple months have been rough.  She was hospitalized with an upper GI bleed, appear to be related to recurrent esophageal varices and she was banded.  She then returned about a week later with increasing abdominal girth and abdominal pain and new onset ascites.  The fluid was consistent with portal hypertension and was not infected, although the patient was treated for possible peritonitis for the first few days of her hospitalization.  Since she has left the hospital, she is been doing fairly well.  No recurrent ascites, no further bleeding as far she can tell, and her hemoglobin was checked and was over 12.  The following portions of the patient's history were reviewed and updated as appropriate: allergies, current medications, past family history, past medical history, past social history, past surgical history and problem list.    Review of Systems   Respiratory: Negative for shortness of breath.    Cardiovascular: Negative for chest pain.   All other systems reviewed and are negative.      Objective   Physical Exam   Constitutional: She appears well-developed and well-nourished.   Abdominal: She exhibits no ascites.   Nursing note and vitals reviewed.        Assessment/Plan   Problems Addressed this Visit        Cardiovascular and Mediastinum    Portal hypertension    Relevant Orders    Comprehensive Metabolic Panel    Protime-INR    Secondary esophageal varices without bleeding    Relevant Orders    Comprehensive Metabolic Panel    Protime-INR    Secondary esophageal varices with bleeding - Primary    Relevant Orders    Case Request (Completed)    Comprehensive Metabolic Panel    Protime-INR       Digestive    Cirrhosis of liver    Relevant Orders    Comprehensive Metabolic Panel    Protime-INR    DUKES (nonalcoholic steatohepatitis)     Relevant Orders    Comprehensive Metabolic Panel    Protime-INR        We'll update her blood work and schedule repeat EGD and probable rebanding for the near future.

## 2017-03-08 ENCOUNTER — TELEPHONE (OUTPATIENT)
Dept: ENDOCRINOLOGY | Age: 57
End: 2017-03-08

## 2017-03-21 ENCOUNTER — OFFICE VISIT (OUTPATIENT)
Dept: ENDOCRINOLOGY | Age: 57
End: 2017-03-21

## 2017-03-21 VITALS
DIASTOLIC BLOOD PRESSURE: 70 MMHG | SYSTOLIC BLOOD PRESSURE: 120 MMHG | HEART RATE: 96 BPM | HEIGHT: 67 IN | BODY MASS INDEX: 27.78 KG/M2 | WEIGHT: 177 LBS | OXYGEN SATURATION: 96 %

## 2017-03-21 DIAGNOSIS — IMO0002 UNCONTROLLED TYPE 2 DIABETES MELLITUS WITH COMPLICATION, WITH LONG-TERM CURRENT USE OF INSULIN: Primary | ICD-10-CM

## 2017-03-21 DIAGNOSIS — E78.5 HYPERLIPIDEMIA, UNSPECIFIED HYPERLIPIDEMIA TYPE: ICD-10-CM

## 2017-03-21 DIAGNOSIS — K75.81 NASH (NONALCOHOLIC STEATOHEPATITIS): ICD-10-CM

## 2017-03-21 PROCEDURE — 99214 OFFICE O/P EST MOD 30 MIN: CPT | Performed by: INTERNAL MEDICINE

## 2017-03-21 NOTE — PROGRESS NOTES
56 y.o.    Patient Care Team:  Blanca Pitts MD as PCP - General (Internal Medicine)  Sukhdeep Mcdowell MD as Consulting Physician (Hematology and Oncology)  Blanca Pitts MD as Referring Physician (Internal Medicine)    Chief Complaint:      Subjective     HPI  Silvia Zabala,56 y.o. WF is here as a follow up for the management of Type 2 dm. Referred by .      Type 2 dm - Pt was diagnosed with diabetes in her early 20's, was started on oral agents initially. Insulin was started 10 year ago. Currently she is on Lantus 80 units at bed time, novolog 25 units with snack and 40 units with meals along with novolog moderate dose ssi tid ac ( 2 units for 50 above 150 mg/dl ).   Patient got her log book for me to review.  She has been checking her blood sugars at least 3-4 times a day.  Fasting blood [id number] mg/dL, lunch time-1 20 mg/dL and dinnertime blood sugars around 1 10 mg/dL.  Occasional nausea and no vomiting episodes.  Patient did have 2-3 blood sugars less than 60 mg/dL in the last 1 month.  They generally happened early in the morning around 1-2 AM.  Does have hypoglycemic awareness. Highest blood sugar in the last 1 month was 1 60 mg/dL  Due for her eye exam, last eye exam was about a year ago, no diabetic retinopathy per patient.  C/o tingling and numbness in her feet, no ulcers or amputation in her feet. On lyrica 75 mg po bid.   No CAD, CVA or CKD per pt.   Did have diabetic education and eats multiple meals a day due to her liver disease. She does try to follow diabetic diet.   She doesn't exercise due to arthritis.   Weight has been stable since last visit.   Negative urine microalbuminuria.       Pancreatic divisum : No hx of pancreatitis per pt.       Liver disease/DUKES - She is required to eat 80 gm of protein per day and she drinks two Glucerna shakes at bed time. Sees Dr. Jordan from [facility].  Patient is currently not drinking her Glucerna due to her recent GI bleed.      Gastroparesis -  Sees Dr. Coleman. On reglan 5 mg po bid.      Hyperlipidemia - on pravastatin 40 mg po daily.     Of note patient was recently hospitalized twice within the last 1 month for upper GI bleed due to esophageal varices and the other time due to ascites.     Interval History      The following portions of the patient's history were reviewed and updated as appropriate: allergies, current medications, past family history, past medical history, past social history, past surgical history and problem list.    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   • Cirrhosis    • Colon polyps    • Deafness    • Depression    • Diabetes mellitus      Adult onset, insulin requiring   • Duodenal ulcer with hemorrhage    • Fibromyalgia    • Gallbladder disease    • Gastroparesis    • Glaucoma    • Granuloma annulare    • H/O B12 deficiency    • H/O Bleeding ulcer      And gastroesophageal varices   • H/O mixed connective tissue disorder    • Hemorrhoids    • Hiatal hernia    • Hx of being hospitalized      In [place] for GI bleeding from ulcer and varices   • Hyperlipidemia    • Migraine    • BRICE (obstructive sleep apnea)    • Pancreas divisum    • Pancytopenia      Chronic, due to cirrhosis and hypersplenism   • Peptic ulcer disease      And esophageal varices   • PONV (postoperative nausea and vomiting)    • RA (rheumatoid arthritis)    • Seizure disorder    • Seizures    • Systemic lupus      Family History   Problem Relation Age of Onset   • Liver disease Other    • Depression Other    • Heart disease Other    • Hypertension Other    • Diabetes Other    • Colon cancer Maternal Aunt    • Hypertension Mother    • Diabetes type II Mother    • Rheum arthritis Mother    • Liver disease Mother      DUKES   • Heart disease Father    • Diabetes type II Father     • Diabetes type II Sister    • Lupus Sister      Social History     Social History   • Marital status:      Spouse name: Jose   • Number of children: N/A   • Years of education: N/A     Occupational History   •  Disabled     Social History Main Topics   • Smoking status: Former Smoker     Packs/day: 0.00     Years: 0.00     Quit date: 12/17/2015   • Smokeless tobacco: Not on file   • Alcohol use No   • Drug use: No   • Sexual activity: Defer     Other Topics Concern   • Not on file     Social History Narrative    Moved to [place] from [place]. She is currently medically disabled.     Allergies   Allergen Reactions   • Albuterol Anaphylaxis   • Quinine Derivatives Nausea And Vomiting   • Tramadol        Current Outpatient Prescriptions:   •  ALPRAZolam (XANAX) 0.5 MG tablet, Take 1 tablet by mouth 2 (Two) Times a Day As Needed for anxiety., Disp: 60 tablet, Rfl: 2  •  cholecalciferol (VITAMIN D3) 1000 UNITS tablet, Take 1,000 Units by mouth Daily., Disp: , Rfl:   •  cyclobenzaprine (FLEXERIL) 5 MG tablet, Take 1 tablet by mouth 2 (Two) Times a Day As Needed for muscle spasms., Disp: 30 tablet, Rfl: 2  •  cycloSPORINE (RESTASIS) 0.05 % ophthalmic emulsion, 1 drop 2 (two) times a day., Disp: , Rfl:   •  diphenhydrAMINE (BENADRYL) 25 mg capsule, Take 25 mg by mouth Every 6 (Six) Hours As Needed., Disp: , Rfl:   •  docusate sodium (COLACE) 100 MG capsule, Take 100 mg by mouth as needed for constipation., Disp: , Rfl:   •  DULoxetine (CYMBALTA) 30 MG capsule, Take 3 capsules daily (Patient taking differently: 90 mg Daily. Take 3 capsules daily), Disp: 90 capsule, Rfl: 5  •  Etanercept 50 MG/ML solution auto-injector, Inject  under the skin 1 (One) Time Per Week., Disp: , Rfl:   •  ferrous sulfate 325 (65 FE) MG tablet, TAKE ONE TABLET BY MOUTH TWICE A DAY, Disp: 60 tablet, Rfl: 6  •  folic acid (FOLVITE) 1 MG tablet, TAKE ONE TABLET BY MOUTH DAILY, Disp: 30 tablet, Rfl: 0  •  glucose blood (ONE TOUCH ULTRA  TEST) test strip, Test blood sugar 4 times daily, Disp: 200 each, Rfl: 5  •  Insulin Glargine (LANTUS SOLOSTAR) 100 UNIT/ML injection pen, INJECT 80 UNITS UNDER THE SKIN DAILY, Disp: 3 pen, Rfl: 3  •  Insulin Pen Needle 31G X 5 MM misc, To inject 5 -6 times per day, Disp: 100 each, Rfl: 11  •  levETIRAcetam (KEPPRA) 500 MG tablet, TAKE ONE TABLET BY MOUTH TWICE A DAY, Disp: 60 tablet, Rfl: 2  •  LYRICA 75 MG capsule, TAKE ONE CAPSULE BY MOUTH TWICE A DAY, Disp: 60 capsule, Rfl: 1  •  MELATONIN PO, Take 20 mg by mouth Daily., Disp: , Rfl:   •  metFORMIN (GLUCOPHAGE) 1000 MG tablet, Take 1 tablet by mouth 2 (Two) Times a Day With Meals. (Patient taking differently: Take 1,000 mg by mouth 2 (Two) Times a Day.), Disp: 60 tablet, Rfl: 11  •  metoclopramide (REGLAN) 5 MG tablet, TAKE ONE TABLET BY MOUTH BEFORE MEALS AND AT BEDTIME; **MUST CALL MD FOR APPOINTMENT, Disp: 60 tablet, Rfl: 0  •  Multiple Vitamin (MULTI-VITAMIN PO), Take  by mouth., Disp: , Rfl:   •  NOVOLOG FLEXPEN 100 UNIT/ML solution pen-injector sc pen, INJECT 40 UNITS UNDER THE SKIN BEFORE EACH MEAL AND 25 units before SNACKS, Disp: 3 pen, Rfl: 5  •  OxyCODONE HCl (OXAYDO) 7.5 MG tablet , Take  by mouth., Disp: , Rfl:   •  pantoprazole (PROTONIX) 40 MG EC tablet, Take 40 mg by mouth daily., Disp: , Rfl:   •  pravastatin (PRAVACHOL) 40 MG tablet, Take 1 tablet by mouth Daily., Disp: 30 tablet, Rfl: 5  •  spironolactone (ALDACTONE) 100 MG tablet, Take 1 tablet by mouth Daily., Disp: 30 tablet, Rfl: 0  •  sucralfate (CARAFATE) 1 G tablet, Take 1 tablet by mouth 2 (two) times a day., Disp: , Rfl:   •  vitamin B-12 (CYANOCOBALAMIN) 1000 MCG tablet, Take 1,000 mcg by mouth daily., Disp: , Rfl:   •  XIFAXAN 550 MG tablet, TAKE ONE TABLET BY MOUTH TWICE A DAY, Disp: 60 tablet, Rfl: 5    Current Facility-Administered Medications:   •  promethazine (PHENERGAN) tablet 25 mg, 25 mg, Oral, Q6H PRN, Rich Coleman MD        Review of Systems   Constitutional:  "Positive for fatigue. Negative for appetite change, chills and diaphoresis.   HENT: Negative for hearing loss, nosebleeds, postnasal drip, trouble swallowing and voice change.    Eyes: Negative for photophobia, discharge and visual disturbance.   Respiratory: Negative for cough, shortness of breath and wheezing.    Cardiovascular: Negative for chest pain, palpitations and leg swelling.   Gastrointestinal: Positive for abdominal pain and nausea. Negative for abdominal distention, constipation, diarrhea and vomiting.   Endocrine: Positive for cold intolerance. Negative for heat intolerance, polydipsia and polyuria.   Genitourinary: Negative for dysuria, frequency and hematuria.   Musculoskeletal: Positive for arthralgias, back pain and myalgias.   Skin: Negative for pallor, rash and wound.   Neurological: Positive for weakness, numbness (hand and feet) and headaches. Negative for dizziness, tremors, seizures and syncope.   Hematological: Negative for adenopathy.   Psychiatric/Behavioral: Negative for agitation, confusion and sleep disturbance. The patient is nervous/anxious.        Objective       Vitals:    03/21/17 1102   BP: 120/70   Pulse: 96   SpO2: 96%   Weight: 177 lb (80.3 kg)   Height: 67\" (170.2 cm)     Body mass index is 27.72 kg/(m^2).      Physical Exam   Constitutional: She is oriented to person, place, and time. She appears well-developed and well-nourished. No distress.   HENT:   Head: Normocephalic and atraumatic.   Mouth/Throat: Oropharynx is clear and moist.   Eyes: Conjunctivae and EOM are normal. Pupils are equal, round, and reactive to light. No scleral icterus.   Neck: Normal range of motion. Neck supple. No tracheal deviation present. No thyromegaly present.   Cardiovascular: Normal rate, regular rhythm and normal heart sounds.  Exam reveals no friction rub.    No murmur heard.  Pulmonary/Chest: Effort normal and breath sounds normal. No stridor. She has no wheezes. She has no rales. "   Abdominal: Soft. Bowel sounds are normal. She exhibits distension. There is no tenderness.   Musculoskeletal: Normal range of motion. She exhibits no edema.   Lymphadenopathy:     She has no cervical adenopathy.   Neurological: She is alert and oriented to person, place, and time. She has normal reflexes. She displays normal reflexes.   Decreased pin prick and proprioception   Skin: Skin is warm and dry. She is not diaphoretic.   Psychiatric: She has a normal mood and affect. Judgment normal.   Vitals reviewed.    Results Review:     I reviewed the patient's new clinical results.    Medical records reviewed  Summary:Done      Lab on 03/07/2017   Component Date Value Ref Range Status   • Hemoglobin A1C 03/07/2017 5.80* 4.80 - 5.60 % Final    Comment: Hemoglobin A1C Ranges:  Increased Risk for Diabetes  5.7% to 6.4%  Diabetes                     >= 6.5%  Diabetic Goal                < 7.0%     • TSH 03/07/2017 3.980  0.270 - 4.200 mIU/mL Final     Lab Results   Component Value Date    HGBA1C 5.80 (H) 03/07/2017    HGBA1C 6.20 (H) 02/22/2017    HGBA1C 8.79 (H) 12/07/2016     Lab Results   Component Value Date    MICROALBUR <3.0 12/07/2016    LDLCALC 79 10/12/2016    CREATININE 0.67 02/24/2017     Imaging Results (most recent)     None            Assessment and Plan:    Diagnoses and all orders for this visit:    Uncontrolled type 2 diabetes mellitus with complication, with long-term current use of insulin  -     Comprehensive Metabolic Panel; Future  -     Hemoglobin A1c; Future  -     Lipid Panel; Future  -     Vitamin B12 & Folate; Future  -     Vitamin D 25 Hydroxy; Future  -     TSH; Future  -     Microalbumin / Creatinine Urine Ratio; Future    Hyperlipidemia, unspecified hyperlipidemia type  -     Comprehensive Metabolic Panel; Future  -     Hemoglobin A1c; Future  -     Lipid Panel; Future  -     Vitamin B12 & Folate; Future  -     Vitamin D 25 Hydroxy; Future  -     TSH; Future  -     Microalbumin /  Creatinine Urine Ratio; Future    DUKES (nonalcoholic steatohepatitis)  -     Comprehensive Metabolic Panel; Future  -     Hemoglobin A1c; Future  -     Lipid Panel; Future  -     Vitamin B12 & Folate; Future  -     Vitamin D 25 Hydroxy; Future  -     TSH; Future  -     Microalbumin / Creatinine Urine Ratio; Future    Type 2 diabetes mellitus  HbA1c significantly improved  Continue Lantus 80 units at bedtime.  Advised patient to decrease Lantus to 75 units when she is not on Glucerna.  Counseled her to go back to 80 units when she starts taking Glucerna.  Continue NovoLog 40 units with each meal and 25 units with snack.  Increase NovoLog sliding scale 3 units for 50 about 1 50 mg/dL.   Advised patient to schedule an eye appointment for routine diabetic retinopathy screening.  Counseled patient to decrease the dose of dinner NovoLog to 35 units in case if she continues to have low blood sugars at bedtime or early morning.  Gave her written handout of these insulin regimen changes to the patient.    Hyperlipidemia  Continue pravastatin 40 mg oral daily  LDL at goal.    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

## 2017-03-27 ENCOUNTER — TELEPHONE (OUTPATIENT)
Dept: GASTROENTEROLOGY | Facility: CLINIC | Age: 57
End: 2017-03-27

## 2017-03-28 ENCOUNTER — APPOINTMENT (OUTPATIENT)
Dept: LAB | Facility: HOSPITAL | Age: 57
End: 2017-03-28

## 2017-03-28 ENCOUNTER — LAB (OUTPATIENT)
Dept: LAB | Facility: HOSPITAL | Age: 57
End: 2017-03-28

## 2017-03-28 ENCOUNTER — OFFICE VISIT (OUTPATIENT)
Dept: ONCOLOGY | Facility: CLINIC | Age: 57
End: 2017-03-28

## 2017-03-28 VITALS
TEMPERATURE: 98.5 F | SYSTOLIC BLOOD PRESSURE: 94 MMHG | WEIGHT: 172.4 LBS | DIASTOLIC BLOOD PRESSURE: 58 MMHG | OXYGEN SATURATION: 98 % | RESPIRATION RATE: 12 BRPM | HEIGHT: 66 IN | HEART RATE: 103 BPM | BODY MASS INDEX: 27.71 KG/M2

## 2017-03-28 DIAGNOSIS — F41.9 ANXIETY AND DEPRESSION: Primary | ICD-10-CM

## 2017-03-28 DIAGNOSIS — I85.11 SECONDARY ESOPHAGEAL VARICES WITH BLEEDING (HCC): ICD-10-CM

## 2017-03-28 DIAGNOSIS — D61.818 PANCYTOPENIA (HCC): ICD-10-CM

## 2017-03-28 DIAGNOSIS — D73.1 HYPERSPLENISM: ICD-10-CM

## 2017-03-28 DIAGNOSIS — K74.60 HEPATIC CIRRHOSIS, UNSPECIFIED HEPATIC CIRRHOSIS TYPE (HCC): ICD-10-CM

## 2017-03-28 DIAGNOSIS — D73.1 HYPERSPLENISM: Primary | ICD-10-CM

## 2017-03-28 DIAGNOSIS — F32.A ANXIETY AND DEPRESSION: Primary | ICD-10-CM

## 2017-03-28 DIAGNOSIS — K76.6 PORTAL HYPERTENSION (HCC): ICD-10-CM

## 2017-03-28 LAB
ALBUMIN SERPL-MCNC: 4.3 G/DL (ref 3.5–5.2)
ALBUMIN/GLOB SERPL: 1.2 G/DL (ref 1.1–2.4)
ALP SERPL-CCNC: 98 U/L (ref 38–116)
ALT SERPL W P-5'-P-CCNC: 35 U/L (ref 0–33)
ANION GAP SERPL CALCULATED.3IONS-SCNC: 12.1 MMOL/L
AST SERPL-CCNC: 37 U/L (ref 0–32)
BASOPHILS # BLD AUTO: 0.04 10*3/MM3 (ref 0–0.1)
BASOPHILS NFR BLD AUTO: 0.9 % (ref 0–1.1)
BILIRUB SERPL-MCNC: 0.6 MG/DL (ref 0.1–1.2)
BUN BLD-MCNC: 17 MG/DL (ref 6–20)
BUN/CREAT SERPL: 20 (ref 7.3–30)
CALCIUM SPEC-SCNC: 9.9 MG/DL (ref 8.5–10.2)
CHLORIDE SERPL-SCNC: 100 MMOL/L (ref 98–107)
CO2 SERPL-SCNC: 26.9 MMOL/L (ref 22–29)
CREAT BLD-MCNC: 0.85 MG/DL (ref 0.6–1.1)
DEPRECATED RDW RBC AUTO: 46.2 FL (ref 37–49)
EOSINOPHIL # BLD AUTO: 0.15 10*3/MM3 (ref 0–0.36)
EOSINOPHIL NFR BLD AUTO: 3.2 % (ref 1–5)
ERYTHROCYTE [DISTWIDTH] IN BLOOD BY AUTOMATED COUNT: 15.2 % (ref 11.7–14.5)
FERRITIN SERPL-MCNC: 55.4 NG/ML (ref 11–207)
GFR SERPL CREATININE-BSD FRML MDRD: 70 ML/MIN/1.73
GLOBULIN UR ELPH-MCNC: 3.5 GM/DL (ref 1.8–3.5)
GLUCOSE BLD-MCNC: 120 MG/DL (ref 74–124)
HCT VFR BLD AUTO: 41.1 % (ref 34–45)
HGB BLD-MCNC: 14.1 G/DL (ref 11.5–14.9)
HOLD SPECIMEN: NORMAL
IMM GRANULOCYTES # BLD: 0.02 10*3/MM3 (ref 0–0.03)
IMM GRANULOCYTES NFR BLD: 0.4 % (ref 0–0.5)
INR PPP: 1.21 (ref 0.9–1.1)
IRON 24H UR-MRATE: 68 MCG/DL (ref 37–145)
IRON SATN MFR SERPL: 12 % (ref 14–48)
LYMPHOCYTES # BLD AUTO: 1.52 10*3/MM3 (ref 1–3.5)
LYMPHOCYTES NFR BLD AUTO: 32.8 % (ref 20–49)
MCH RBC QN AUTO: 28.9 PG (ref 27–33)
MCHC RBC AUTO-ENTMCNC: 34.3 G/DL (ref 32–35)
MCV RBC AUTO: 84.2 FL (ref 83–97)
MONOCYTES # BLD AUTO: 0.48 10*3/MM3 (ref 0.25–0.8)
MONOCYTES NFR BLD AUTO: 10.3 % (ref 4–12)
NEUTROPHILS # BLD AUTO: 2.43 10*3/MM3 (ref 1.5–7)
NEUTROPHILS NFR BLD AUTO: 52.4 % (ref 39–75)
NRBC BLD MANUAL-RTO: 0 /100 WBC (ref 0–0)
PLATELET # BLD AUTO: 106 10*3/MM3 (ref 150–375)
PMV BLD AUTO: 10.9 FL (ref 8.9–12.1)
POTASSIUM BLD-SCNC: 4.3 MMOL/L (ref 3.5–4.7)
PROT SERPL-MCNC: 7.8 G/DL (ref 6.3–8)
PROTHROMBIN TIME: 14.9 SECONDS (ref 11.7–14.2)
RBC # BLD AUTO: 4.88 10*6/MM3 (ref 3.9–5)
SODIUM BLD-SCNC: 139 MMOL/L (ref 134–145)
TIBC SERPL-MCNC: 574 MCG/DL (ref 249–505)
TRANSFERRIN SERPL-MCNC: 410 MG/DL (ref 200–360)
WBC NRBC COR # BLD: 4.64 10*3/MM3 (ref 4–10)

## 2017-03-28 PROCEDURE — 99213 OFFICE O/P EST LOW 20 MIN: CPT | Performed by: INTERNAL MEDICINE

## 2017-03-28 PROCEDURE — 82728 ASSAY OF FERRITIN: CPT | Performed by: INTERNAL MEDICINE

## 2017-03-28 PROCEDURE — 36415 COLL VENOUS BLD VENIPUNCTURE: CPT | Performed by: INTERNAL MEDICINE

## 2017-03-28 PROCEDURE — 85610 PROTHROMBIN TIME: CPT | Performed by: INTERNAL MEDICINE

## 2017-03-28 PROCEDURE — 80053 COMPREHEN METABOLIC PANEL: CPT | Performed by: INTERNAL MEDICINE

## 2017-03-28 PROCEDURE — 85025 COMPLETE CBC W/AUTO DIFF WBC: CPT | Performed by: INTERNAL MEDICINE

## 2017-03-28 PROCEDURE — 83540 ASSAY OF IRON: CPT | Performed by: INTERNAL MEDICINE

## 2017-03-28 PROCEDURE — 84466 ASSAY OF TRANSFERRIN: CPT | Performed by: INTERNAL MEDICINE

## 2017-03-28 NOTE — PROGRESS NOTES
Subjective   REASONS FOR FOLLOWUP:   1. 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   2. Evidence of B12 deficiency on her initial lab evaluation in April 2013. Patient was started on parenteral B12 replacement. The patient was switched from shots to daily oral B12 replacement after the visit of 12/02/2013.   3. Borderline iron stores. The patient was started on oral iron supplement once daily after the visit of 06/17/2013.   4. Morphologically normal bone marrow from 08/20/2013 with normal flow cytometry. 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 Significance of this is uncertain.   5. Mixed connective tissue disorder, currently on Enbrel therapy.   6. Insulin-requiring diabetes mellitus.   7. Bleeding ulcer and gastroesophageal varices, treated while visiting family in [place] in November 2015  8. Right breast biopsy in April 2016 with benign pathology (ductal hyperplasia).  9. Banding of esophageal varices by Dr. Rich Coleman during EGD February 2017.      HISTORY OF PRESENT ILLNESS:     History of Present Illness Silvia is a 56-year-old female with cirrhosis of the liver causing hypersplenism and pancytopenia.  She has had GI bleeding in the past due to portal hypertension.  She also has been B12 deficient and takes oral B12 supplementation daily.    She has undergone evaluation for possible liver transplant at [facility].  She currently is not on the transplant list but continues to follow-up periodically.  She remains on Enbrel for her rheumatologic condition.  Her blood counts are significantly improved today with a hemoglobin of 14.  She recently had been started on Aldactone due to development of ascites.  She also has increased her oral iron supplement to twice daily dosing.    She continues to follow-up with Dr. Rich Coleman.  He performed banding of  "her esophageal varices in February and is planning to perform another EGD in April.    Past Medical History, Past Surgical History, Social History, Family History have been reviewed and are without significant changes except as mentioned.    Review of Systems   Constitutional: Positive for fatigue. Negative for activity change, appetite change, fever and unexpected weight change.   HENT: Negative for hearing loss, nosebleeds, trouble swallowing and voice change.    Eyes: Negative for visual disturbance.   Respiratory: Negative for cough, shortness of breath and wheezing.    Cardiovascular: Negative for chest pain and palpitations.   Gastrointestinal: Positive for constipation. Negative for abdominal pain, diarrhea, nausea and vomiting.   Genitourinary: Negative for difficulty urinating, frequency, hematuria and urgency.   Musculoskeletal: Positive for arthralgias. Negative for back pain and neck pain.   Skin: Negative for rash.   Neurological: Negative for dizziness, seizures, syncope and headaches.   Hematological: Negative for adenopathy. Does not bruise/bleed easily.   Psychiatric/Behavioral: Negative for behavioral problems. The patient is not nervous/anxious.       A comprehensive 14 point review of systems was performed and was negative except as mentioned.    Medications:  The current medication list was reviewed in the EMR    ALLERGIES:    Allergies   Allergen Reactions   • Albuterol Anaphylaxis   • Quinine Derivatives Nausea And Vomiting   • Tramadol        Objective      Vitals:    03/28/17 0813   BP: 94/58   Pulse: 103   Resp: 12   Temp: 98.5 °F (36.9 °C)   TempSrc: Oral   SpO2: 98%   Weight: 172 lb 6.4 oz (78.2 kg)   Height: 65.95\" (167.5 cm)  Comment: new height   PainSc: 7  Comment: back pain, all over pain     Current Status 3/28/2017   ECOG score 0       Physical Exam   Constitutional: She is oriented to person, place, and time. She appears well-developed and well-nourished. No distress.   HENT: "   Head: Normocephalic.   Eyes: Conjunctivae and EOM are normal. Pupils are equal, round, and reactive to light. No scleral icterus.   Neck: Normal range of motion. Neck supple. No JVD present. No thyromegaly present.   Cardiovascular: Normal rate and regular rhythm.  Exam reveals no gallop and no friction rub.    No murmur heard.  Pulmonary/Chest: Effort normal and breath sounds normal. She has no wheezes. She has no rales.   Abdominal: Soft. She exhibits no distension and no mass. There is no tenderness.   Musculoskeletal: Normal range of motion. She exhibits no edema or deformity.   Lymphadenopathy:     She has no cervical adenopathy.   Neurological: She is alert and oriented to person, place, and time. She has normal reflexes. No cranial nerve deficit.   Skin: Skin is warm and dry. No rash noted. No erythema.   Spider angiomata   Psychiatric: She has a normal mood and affect. Her behavior is normal. Judgment normal.        RECENT LABS:  Hematology WBC   Date Value Ref Range Status   03/28/2017 4.64 4.00 - 10.00 10*3/mm3 Final     RBC   Date Value Ref Range Status   03/28/2017 4.88 3.90 - 5.00 10*6/mm3 Final     Hemoglobin   Date Value Ref Range Status   03/28/2017 14.1 11.5 - 14.9 g/dL Final     Hematocrit   Date Value Ref Range Status   03/28/2017 41.1 34.0 - 45.0 % Final     Platelets   Date Value Ref Range Status   03/28/2017 106 (L) 150 - 375 10*3/mm3 Final          Assessment/Plan     1. 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   2. Evidence of B12 deficiency on her initial lab evaluation in April 2013.  Patient was started on parenteral B12 replacement.  The patient was switched from shots to daily oral B12 replacement after the visit of 12/02/2013.    3. Employment has significantly improved since banding of her esophageal varices and increase in her oral iron supplement to twice daily dosing.  4. Morphologically normal bone marrow from 08/20/2013 with normal flow cytometry.  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  Significance of this is uncertain.    5. Mixed connective tissue disorder, currently on Enbrel therapy.  6. Insulin-requiring diabetes mellitus.  7. Patient continues to follow-up periodically with the transplant team at [facility] but currently she is not on the transplant list.      Plan    1.continue oral B12 replacement.  2.  Continue twice daily oral iron supplementation.  3.  As noted above she'll be undergoing a repeat EGD with Dr. Coleman in April 2017.  4.  We will schedule routine follow-up in 6 months with repeat iron studies at that time and a CBC.  The patient knows that she can call us sooner if she is having any new issues prior to that visit.              3/28/2017      CC:

## 2017-03-29 RX ORDER — FOLIC ACID 1 MG/1
TABLET ORAL
Qty: 30 TABLET | Refills: 3 | Status: SHIPPED | OUTPATIENT
Start: 2017-03-29 | End: 2017-08-02 | Stop reason: SDUPTHER

## 2017-03-30 RX ORDER — PRAVASTATIN SODIUM 40 MG
40 TABLET ORAL DAILY
Qty: 90 TABLET | Refills: 0 | Status: SHIPPED | OUTPATIENT
Start: 2017-03-30 | End: 2017-03-31 | Stop reason: SDUPTHER

## 2017-03-31 RX ORDER — PRAVASTATIN SODIUM 40 MG
40 TABLET ORAL DAILY
Qty: 90 TABLET | Refills: 1 | Status: SHIPPED | OUTPATIENT
Start: 2017-03-31 | End: 2017-12-20 | Stop reason: HOSPADM

## 2017-04-05 RX ORDER — PROMETHAZINE HYDROCHLORIDE 25 MG/1
25 TABLET ORAL EVERY 6 HOURS PRN
COMMUNITY
End: 2017-06-27 | Stop reason: SDUPTHER

## 2017-04-06 ENCOUNTER — ANESTHESIA (OUTPATIENT)
Dept: GASTROENTEROLOGY | Facility: HOSPITAL | Age: 57
End: 2017-04-06

## 2017-04-06 ENCOUNTER — HOSPITAL ENCOUNTER (OUTPATIENT)
Facility: HOSPITAL | Age: 57
Setting detail: HOSPITAL OUTPATIENT SURGERY
Discharge: HOME | End: 2017-04-06
Attending: INTERNAL MEDICINE | Admitting: INTERNAL MEDICINE

## 2017-04-06 ENCOUNTER — ANESTHESIA EVENT (OUTPATIENT)
Dept: GASTROENTEROLOGY | Facility: HOSPITAL | Age: 57
End: 2017-04-06

## 2017-04-06 VITALS
BODY MASS INDEX: 28.78 KG/M2 | SYSTOLIC BLOOD PRESSURE: 134 MMHG | HEART RATE: 79 BPM | WEIGHT: 178 LBS | TEMPERATURE: 98.3 F | DIASTOLIC BLOOD PRESSURE: 82 MMHG | OXYGEN SATURATION: 97 % | RESPIRATION RATE: 17 BRPM

## 2017-04-06 DIAGNOSIS — I85.11 SECONDARY ESOPHAGEAL VARICES WITH BLEEDING (HCC): ICD-10-CM

## 2017-04-06 LAB — GLUCOSE BLDC GLUCOMTR-MCNC: 295 MG/DL (ref 70–130)

## 2017-04-06 PROCEDURE — 25010000002 PROPOFOL 10 MG/ML EMULSION: Performed by: ANESTHESIOLOGY

## 2017-04-06 PROCEDURE — 43244 EGD VARICES LIGATION: CPT | Performed by: INTERNAL MEDICINE

## 2017-04-06 PROCEDURE — 82962 GLUCOSE BLOOD TEST: CPT

## 2017-04-06 RX ORDER — LIDOCAINE HYDROCHLORIDE 20 MG/ML
INJECTION, SOLUTION INFILTRATION; PERINEURAL AS NEEDED
Status: DISCONTINUED | OUTPATIENT
Start: 2017-04-06 | End: 2017-04-06 | Stop reason: SURG

## 2017-04-06 RX ORDER — SODIUM CHLORIDE, SODIUM LACTATE, POTASSIUM CHLORIDE, CALCIUM CHLORIDE 600; 310; 30; 20 MG/100ML; MG/100ML; MG/100ML; MG/100ML
30 INJECTION, SOLUTION INTRAVENOUS CONTINUOUS PRN
Status: DISCONTINUED | OUTPATIENT
Start: 2017-04-06 | End: 2017-04-06 | Stop reason: HOSPADM

## 2017-04-06 RX ORDER — ONDANSETRON 2 MG/ML
4 INJECTION INTRAMUSCULAR; INTRAVENOUS ONCE AS NEEDED
Status: DISCONTINUED | OUTPATIENT
Start: 2017-04-06 | End: 2017-04-06 | Stop reason: HOSPADM

## 2017-04-06 RX ORDER — SODIUM CHLORIDE, SODIUM LACTATE, POTASSIUM CHLORIDE, CALCIUM CHLORIDE 600; 310; 30; 20 MG/100ML; MG/100ML; MG/100ML; MG/100ML
30 INJECTION, SOLUTION INTRAVENOUS CONTINUOUS
Status: DISCONTINUED | OUTPATIENT
Start: 2017-04-06 | End: 2017-04-06

## 2017-04-06 RX ORDER — PROPOFOL 10 MG/ML
VIAL (ML) INTRAVENOUS AS NEEDED
Status: DISCONTINUED | OUTPATIENT
Start: 2017-04-06 | End: 2017-04-06 | Stop reason: SURG

## 2017-04-06 RX ADMIN — SODIUM CHLORIDE, POTASSIUM CHLORIDE, SODIUM LACTATE AND CALCIUM CHLORIDE 30 ML/HR: 600; 310; 30; 20 INJECTION, SOLUTION INTRAVENOUS at 08:34

## 2017-04-06 RX ADMIN — LIDOCAINE HYDROCHLORIDE 100 MG: 20 INJECTION, SOLUTION INFILTRATION; PERINEURAL at 10:11

## 2017-04-06 RX ADMIN — PROPOFOL 200 MG: 10 INJECTION, EMULSION INTRAVENOUS at 10:21

## 2017-04-06 RX ADMIN — PROPOFOL 200 MG: 10 INJECTION, EMULSION INTRAVENOUS at 10:11

## 2017-04-06 NOTE — ANESTHESIA PREPROCEDURE EVALUATION
Anesthesia Evaluation        Airway   Mallampati: I  TM distance: >3 FB  Neck ROM: full  no difficulty expected  Dental - normal exam     Pulmonary - normal exam   (+) sleep apnea,   Cardiovascular - normal exam        Neuro/Psych  (+) seizures well controlled, psychiatric history,    GI/Hepatic/Renal/Endo    (+)  hepatitis, liver disease, diabetes mellitus type 2 poorly controlled using insulin,     Musculoskeletal     Abdominal  - normal exam    Bowel sounds: normal.   Substance History      OB/GYN          Other                                    Anesthesia Plan    ASA 3     MAC     intravenous induction   Anesthetic plan and risks discussed with patient.

## 2017-04-06 NOTE — PLAN OF CARE
Problem: Patient Care Overview (Adult)  Goal: Plan of Care Review  Outcome: Ongoing (interventions implemented as appropriate)    04/06/17 0813   Coping/Psychosocial Response Interventions   Plan Of Care Reviewed With patient       Goal: Adult Individualization and Mutuality  Outcome: Ongoing (interventions implemented as appropriate)    04/06/17 0813   Individualization   Patient Specific Preferences none       Goal: Discharge Needs Assessment  Outcome: Ongoing (interventions implemented as appropriate)    04/06/17 0813   Discharge Needs Assessment   Concerns To Be Addressed no discharge needs identified   Living Environment   Transportation Available family or friend will provide         Problem: GI Endoscopy (Adult)  Goal: Signs and Symptoms of Listed Potential Problems Will be Absent or Manageable (GI Endoscopy)  Outcome: Ongoing (interventions implemented as appropriate)    04/06/17 0813   GI Endoscopy   Problems Assessed (GI Endoscopy) pain   Problems Present (GI Endoscopy) none

## 2017-04-06 NOTE — ANESTHESIA POSTPROCEDURE EVALUATION
Patient: Silvia Zabala    Procedure Summary     Date Anesthesia Start Anesthesia Stop Room / Location    04/06/17 1008 1028  [facility] /  [facility]       Procedure Diagnosis Surgeon Provider    ESOPHAGOGASTRODUODENOSCOPY WITH ESOPHAGEAL VARICES BANDING x2 (N/A Esophagus) Secondary esophageal varices with bleeding  (Secondary esophageal varices with bleeding [I85.11]) MD Maria Guadalupe Mon MD          Anesthesia Type: MAC  Last vitals  /82 (04/06/17 1052)    Temp      Pulse 79 (04/06/17 1052)   Resp 17 (04/06/17 1052)    SpO2 97 % (04/06/17 1052)      Post Anesthesia Care and Evaluation    Patient location during evaluation: PACU  Patient participation: complete - patient participated  Level of consciousness: awake and alert  Pain score: 0  Pain management: adequate  Airway patency: patent  Anesthetic complications: No anesthetic complications    Cardiovascular status: acceptable  Respiratory status: acceptable  Hydration status: acceptable

## 2017-04-06 NOTE — DISCHARGE INSTRUCTIONS
For the next 24 hours patient needs to be with a responsible adult.    For 24 hours DO NOT drive, operate machinery, appliances, drink alcohol, make important decisions or sign legal documents.    Start with a light or bland diet and advance to regular diet as tolerated.    Follow recommendations on procedure report provided by your doctor.    Call Dr ALMARAZ for problems     Problems may include but not limited to: large amounts of bleeding, trouble breathing, repeated vomiting, severe unrelieved pain, fever or chills.

## 2017-04-11 ENCOUNTER — TELEPHONE (OUTPATIENT)
Dept: GASTROENTEROLOGY | Facility: CLINIC | Age: 57
End: 2017-04-11

## 2017-04-12 DIAGNOSIS — E11.8 TYPE 2 DIABETES MELLITUS WITH COMPLICATION, WITH LONG-TERM CURRENT USE OF INSULIN (HCC): ICD-10-CM

## 2017-04-12 DIAGNOSIS — Z79.4 TYPE 2 DIABETES MELLITUS WITH COMPLICATION, WITH LONG-TERM CURRENT USE OF INSULIN (HCC): ICD-10-CM

## 2017-04-12 RX ORDER — INSULIN ASPART 100 [IU]/ML
INJECTION, SOLUTION INTRAVENOUS; SUBCUTANEOUS
Qty: 30 ML | Refills: 5 | Status: SHIPPED | OUTPATIENT
Start: 2017-04-12 | End: 2017-05-04 | Stop reason: SDUPTHER

## 2017-04-14 ENCOUNTER — TELEPHONE (OUTPATIENT)
Dept: GASTROENTEROLOGY | Facility: CLINIC | Age: 57
End: 2017-04-14

## 2017-04-16 DIAGNOSIS — R11.0 NAUSEA: ICD-10-CM

## 2017-04-17 RX ORDER — METOCLOPRAMIDE 5 MG/1
TABLET ORAL
Qty: 60 TABLET | Refills: 0 | Status: SHIPPED | OUTPATIENT
Start: 2017-04-17 | End: 2017-05-02 | Stop reason: SDUPTHER

## 2017-04-19 RX ORDER — FERROUS SULFATE 325(65) MG
TABLET ORAL
Qty: 60 TABLET | Refills: 4 | Status: SHIPPED | OUTPATIENT
Start: 2017-04-19 | End: 2017-09-14 | Stop reason: SDUPTHER

## 2017-04-24 ENCOUNTER — TELEPHONE (OUTPATIENT)
Dept: GASTROENTEROLOGY | Facility: CLINIC | Age: 57
End: 2017-04-24

## 2017-04-24 ENCOUNTER — HOSPITAL ENCOUNTER (INPATIENT)
Facility: HOSPITAL | Age: 57
LOS: 1 days | Discharge: HOME | End: 2017-04-26
Attending: EMERGENCY MEDICINE | Admitting: INTERNAL MEDICINE

## 2017-04-24 DIAGNOSIS — K75.81 NONALCOHOLIC STEATOHEPATITIS (NASH): ICD-10-CM

## 2017-04-24 DIAGNOSIS — K92.2 UPPER GI BLEED: Primary | ICD-10-CM

## 2017-04-24 DIAGNOSIS — R10.84 GENERALIZED ABDOMINAL PAIN: ICD-10-CM

## 2017-04-24 DIAGNOSIS — K92.1 MELENA: ICD-10-CM

## 2017-04-24 DIAGNOSIS — K76.6 PORTAL HYPERTENSION (HCC): ICD-10-CM

## 2017-04-24 LAB
ABO GROUP BLD: NORMAL
ALBUMIN SERPL-MCNC: 3.4 G/DL (ref 3.5–5.2)
ALBUMIN/GLOB SERPL: 0.9 G/DL
ALP SERPL-CCNC: 133 U/L (ref 39–117)
ALT SERPL W P-5'-P-CCNC: 64 U/L (ref 1–33)
ANION GAP SERPL CALCULATED.3IONS-SCNC: 14.8 MMOL/L
AST SERPL-CCNC: 51 U/L (ref 1–32)
BASOPHILS # BLD AUTO: 0.01 10*3/MM3 (ref 0–0.2)
BASOPHILS NFR BLD AUTO: 0.3 % (ref 0–1.5)
BILIRUB SERPL-MCNC: 0.6 MG/DL (ref 0.1–1.2)
BILIRUB UR QL STRIP: NEGATIVE
BLD GP AB SCN SERPL QL: NEGATIVE
BUN BLD-MCNC: 10 MG/DL (ref 6–20)
BUN/CREAT SERPL: 13.3 (ref 7–25)
CALCIUM SPEC-SCNC: 9.2 MG/DL (ref 8.6–10.5)
CHLORIDE SERPL-SCNC: 97 MMOL/L (ref 98–107)
CLARITY UR: ABNORMAL
CO2 SERPL-SCNC: 23.2 MMOL/L (ref 22–29)
COLOR UR: ABNORMAL
CREAT BLD-MCNC: 0.75 MG/DL (ref 0.57–1)
DEPRECATED RDW RBC AUTO: 48.3 FL (ref 37–54)
EOSINOPHIL # BLD AUTO: 0.08 10*3/MM3 (ref 0–0.7)
EOSINOPHIL NFR BLD AUTO: 2.1 % (ref 0.3–6.2)
ERYTHROCYTE [DISTWIDTH] IN BLOOD BY AUTOMATED COUNT: 15 % (ref 11.7–13)
GFR SERPL CREATININE-BSD FRML MDRD: 81 ML/MIN/1.73
GLOBULIN UR ELPH-MCNC: 3.6 GM/DL
GLUCOSE BLD-MCNC: 292 MG/DL (ref 65–99)
GLUCOSE UR STRIP-MCNC: ABNORMAL MG/DL
HCT VFR BLD AUTO: 34.4 % (ref 35.6–45.5)
HGB BLD-MCNC: 11.4 G/DL (ref 11.9–15.5)
HGB UR QL STRIP.AUTO: NEGATIVE
HOLD SPECIMEN: NORMAL
IMM GRANULOCYTES # BLD: 0.02 10*3/MM3 (ref 0–0.03)
IMM GRANULOCYTES NFR BLD: 0.5 % (ref 0–0.5)
KETONES UR QL STRIP: NEGATIVE
LEUKOCYTE ESTERASE UR QL STRIP.AUTO: NEGATIVE
LYMPHOCYTES # BLD AUTO: 0.94 10*3/MM3 (ref 0.9–4.8)
LYMPHOCYTES NFR BLD AUTO: 24.6 % (ref 19.6–45.3)
MCH RBC QN AUTO: 28.9 PG (ref 26.9–32)
MCHC RBC AUTO-ENTMCNC: 33.1 G/DL (ref 32.4–36.3)
MCV RBC AUTO: 87.1 FL (ref 80.5–98.2)
MONOCYTES # BLD AUTO: 0.32 10*3/MM3 (ref 0.2–1.2)
MONOCYTES NFR BLD AUTO: 8.4 % (ref 5–12)
NEUTROPHILS # BLD AUTO: 2.45 10*3/MM3 (ref 1.9–8.1)
NEUTROPHILS NFR BLD AUTO: 64.1 % (ref 42.7–76)
NITRITE UR QL STRIP: NEGATIVE
PH UR STRIP.AUTO: 5.5 [PH] (ref 5–8)
PLATELET # BLD AUTO: 74 10*3/MM3 (ref 140–500)
PMV BLD AUTO: 11.2 FL (ref 6–12)
POTASSIUM BLD-SCNC: 4 MMOL/L (ref 3.5–5.2)
PROT SERPL-MCNC: 7 G/DL (ref 6–8.5)
PROT UR QL STRIP: NEGATIVE
RBC # BLD AUTO: 3.95 10*6/MM3 (ref 3.9–5.2)
RH BLD: POSITIVE
SODIUM BLD-SCNC: 135 MMOL/L (ref 136–145)
SP GR UR STRIP: >=1.03 (ref 1–1.03)
UROBILINOGEN UR QL STRIP: ABNORMAL
WBC NRBC COR # BLD: 3.82 10*3/MM3 (ref 4.5–10.7)
WHOLE BLOOD HOLD SPECIMEN: NORMAL

## 2017-04-24 PROCEDURE — 86901 BLOOD TYPING SEROLOGIC RH(D): CPT | Performed by: EMERGENCY MEDICINE

## 2017-04-24 PROCEDURE — 80053 COMPREHEN METABOLIC PANEL: CPT | Performed by: EMERGENCY MEDICINE

## 2017-04-24 PROCEDURE — 36415 COLL VENOUS BLD VENIPUNCTURE: CPT | Performed by: EMERGENCY MEDICINE

## 2017-04-24 PROCEDURE — 99284 EMERGENCY DEPT VISIT MOD MDM: CPT

## 2017-04-24 PROCEDURE — 85025 COMPLETE CBC W/AUTO DIFF WBC: CPT | Performed by: EMERGENCY MEDICINE

## 2017-04-24 PROCEDURE — 86900 BLOOD TYPING SEROLOGIC ABO: CPT | Performed by: EMERGENCY MEDICINE

## 2017-04-24 PROCEDURE — 86850 RBC ANTIBODY SCREEN: CPT | Performed by: EMERGENCY MEDICINE

## 2017-04-24 PROCEDURE — 81003 URINALYSIS AUTO W/O SCOPE: CPT | Performed by: EMERGENCY MEDICINE

## 2017-04-24 RX ORDER — SODIUM CHLORIDE 0.9 % (FLUSH) 0.9 %
10 SYRINGE (ML) INJECTION AS NEEDED
Status: DISCONTINUED | OUTPATIENT
Start: 2017-04-24 | End: 2017-04-26 | Stop reason: HOSPADM

## 2017-04-24 RX ORDER — ONDANSETRON 2 MG/ML
4 INJECTION INTRAMUSCULAR; INTRAVENOUS ONCE
Status: DISCONTINUED | OUTPATIENT
Start: 2017-04-24 | End: 2017-04-25

## 2017-04-24 NOTE — TELEPHONE ENCOUNTER
"Pt called stating that almost one week ago started with nausea/vomiting and \"black/green stools\".  She has generalized abd pain, but this is not new symptom. No other symptoms.  Discussed with Dr Coleman, advised pt to proceed to [facility] ER for evaluation.  Pt verbalized understanding.  "

## 2017-04-25 ENCOUNTER — APPOINTMENT (OUTPATIENT)
Dept: ULTRASOUND IMAGING | Facility: HOSPITAL | Age: 57
End: 2017-04-25

## 2017-04-25 ENCOUNTER — APPOINTMENT (OUTPATIENT)
Dept: CT IMAGING | Facility: HOSPITAL | Age: 57
End: 2017-04-25

## 2017-04-25 PROBLEM — K92.2 UPPER GI BLEED: Status: ACTIVE | Noted: 2017-04-25

## 2017-04-25 LAB
ALBUMIN SERPL-MCNC: 3.4 G/DL (ref 3.5–5.2)
ALBUMIN/GLOB SERPL: 1.2 G/DL
ALP SERPL-CCNC: 121 U/L (ref 39–117)
ALT SERPL W P-5'-P-CCNC: 51 U/L (ref 1–33)
ANION GAP SERPL CALCULATED.3IONS-SCNC: 11.3 MMOL/L
AST SERPL-CCNC: 43 U/L (ref 1–32)
BASOPHILS # BLD AUTO: 0 10*3/MM3 (ref 0–0.2)
BASOPHILS NFR BLD AUTO: 0 % (ref 0–1.5)
BILIRUB SERPL-MCNC: 0.8 MG/DL (ref 0.1–1.2)
BUN BLD-MCNC: 8 MG/DL (ref 6–20)
BUN/CREAT SERPL: 13.6 (ref 7–25)
CALCIUM SPEC-SCNC: 8.5 MG/DL (ref 8.6–10.5)
CHLORIDE SERPL-SCNC: 103 MMOL/L (ref 98–107)
CO2 SERPL-SCNC: 23.7 MMOL/L (ref 22–29)
CREAT BLD-MCNC: 0.59 MG/DL (ref 0.57–1)
D-LACTATE SERPL-SCNC: 1.5 MMOL/L (ref 0.5–2)
DEPRECATED RDW RBC AUTO: 47.3 FL (ref 37–54)
EOSINOPHIL # BLD AUTO: 0.04 10*3/MM3 (ref 0–0.7)
EOSINOPHIL NFR BLD AUTO: 1.7 % (ref 0.3–6.2)
ERYTHROCYTE [DISTWIDTH] IN BLOOD BY AUTOMATED COUNT: 15 % (ref 11.7–13)
GFR SERPL CREATININE-BSD FRML MDRD: 106 ML/MIN/1.73
GLOBULIN UR ELPH-MCNC: 2.9 GM/DL
GLUCOSE BLD-MCNC: 159 MG/DL (ref 65–99)
GLUCOSE BLDC GLUCOMTR-MCNC: 131 MG/DL (ref 70–130)
GLUCOSE BLDC GLUCOMTR-MCNC: 135 MG/DL (ref 70–130)
GLUCOSE BLDC GLUCOMTR-MCNC: 153 MG/DL (ref 70–130)
GLUCOSE BLDC GLUCOMTR-MCNC: 210 MG/DL (ref 70–130)
GLUCOSE BLDC GLUCOMTR-MCNC: 235 MG/DL (ref 70–130)
HBA1C MFR BLD: 6.86 % (ref 4.8–5.6)
HCT VFR BLD AUTO: 31.8 % (ref 35.6–45.5)
HCT VFR BLD AUTO: 31.8 % (ref 35.6–45.5)
HCT VFR BLD AUTO: 33.4 % (ref 35.6–45.5)
HGB BLD-MCNC: 10.6 G/DL (ref 11.9–15.5)
HGB BLD-MCNC: 10.6 G/DL (ref 11.9–15.5)
HGB BLD-MCNC: 10.9 G/DL (ref 11.9–15.5)
IMM GRANULOCYTES # BLD: 0 10*3/MM3 (ref 0–0.03)
IMM GRANULOCYTES NFR BLD: 0 % (ref 0–0.5)
INR PPP: 1.21 (ref 0.9–1.1)
IRON 24H UR-MRATE: 46 MCG/DL (ref 37–145)
IRON SATN MFR SERPL: 11 % (ref 20–50)
LYMPHOCYTES # BLD AUTO: 0.8 10*3/MM3 (ref 0.9–4.8)
LYMPHOCYTES NFR BLD AUTO: 33.9 % (ref 19.6–45.3)
MAGNESIUM SERPL-MCNC: 1.7 MG/DL (ref 1.6–2.6)
MCH RBC QN AUTO: 28.9 PG (ref 26.9–32)
MCHC RBC AUTO-ENTMCNC: 33.3 G/DL (ref 32.4–36.3)
MCV RBC AUTO: 86.6 FL (ref 80.5–98.2)
MONOCYTES # BLD AUTO: 0.25 10*3/MM3 (ref 0.2–1.2)
MONOCYTES NFR BLD AUTO: 10.6 % (ref 5–12)
NEUTROPHILS # BLD AUTO: 1.27 10*3/MM3 (ref 1.9–8.1)
NEUTROPHILS NFR BLD AUTO: 53.8 % (ref 42.7–76)
PLATELET # BLD AUTO: 57 10*3/MM3 (ref 140–500)
PMV BLD AUTO: 11 FL (ref 6–12)
POTASSIUM BLD-SCNC: 4 MMOL/L (ref 3.5–5.2)
PROT SERPL-MCNC: 6.3 G/DL (ref 6–8.5)
PROTHROMBIN TIME: 14.9 SECONDS (ref 11.7–14.2)
RBC # BLD AUTO: 3.67 10*6/MM3 (ref 3.9–5.2)
SODIUM BLD-SCNC: 138 MMOL/L (ref 136–145)
TIBC SERPL-MCNC: 428 MCG/DL (ref 298–536)
TRANSFERRIN SERPL-MCNC: 287 MG/DL (ref 200–360)
WBC NRBC COR # BLD: 2.36 10*3/MM3 (ref 4.5–10.7)

## 2017-04-25 PROCEDURE — 84466 ASSAY OF TRANSFERRIN: CPT | Performed by: INTERNAL MEDICINE

## 2017-04-25 PROCEDURE — 74177 CT ABD & PELVIS W/CONTRAST: CPT

## 2017-04-25 PROCEDURE — 76700 US EXAM ABDOM COMPLETE: CPT

## 2017-04-25 PROCEDURE — 25010000002 OCTREOTIDE PER 25 MCG: Performed by: INTERNAL MEDICINE

## 2017-04-25 PROCEDURE — 83735 ASSAY OF MAGNESIUM: CPT | Performed by: INTERNAL MEDICINE

## 2017-04-25 PROCEDURE — 25010000003 CEFTRIAXONE PER 250 MG: Performed by: INTERNAL MEDICINE

## 2017-04-25 PROCEDURE — 63710000001 INSULIN ASPART PER 5 UNITS: Performed by: INTERNAL MEDICINE

## 2017-04-25 PROCEDURE — 83540 ASSAY OF IRON: CPT | Performed by: INTERNAL MEDICINE

## 2017-04-25 PROCEDURE — 0 IOPAMIDOL 61 % SOLUTION: Performed by: INTERNAL MEDICINE

## 2017-04-25 PROCEDURE — 85018 HEMOGLOBIN: CPT | Performed by: INTERNAL MEDICINE

## 2017-04-25 PROCEDURE — 82962 GLUCOSE BLOOD TEST: CPT

## 2017-04-25 PROCEDURE — 25010000002 MORPHINE PER 10 MG: Performed by: EMERGENCY MEDICINE

## 2017-04-25 PROCEDURE — 63710000001 INSULIN DETEMER PER 5 UNITS: Performed by: INTERNAL MEDICINE

## 2017-04-25 PROCEDURE — 83605 ASSAY OF LACTIC ACID: CPT | Performed by: PHYSICIAN ASSISTANT

## 2017-04-25 PROCEDURE — 85610 PROTHROMBIN TIME: CPT | Performed by: INTERNAL MEDICINE

## 2017-04-25 PROCEDURE — 99223 1ST HOSP IP/OBS HIGH 75: CPT | Performed by: INTERNAL MEDICINE

## 2017-04-25 PROCEDURE — 83036 HEMOGLOBIN GLYCOSYLATED A1C: CPT | Performed by: INTERNAL MEDICINE

## 2017-04-25 PROCEDURE — 85025 COMPLETE CBC W/AUTO DIFF WBC: CPT | Performed by: INTERNAL MEDICINE

## 2017-04-25 PROCEDURE — 25010000002 ONDANSETRON PER 1 MG: Performed by: INTERNAL MEDICINE

## 2017-04-25 PROCEDURE — 80053 COMPREHEN METABOLIC PANEL: CPT | Performed by: INTERNAL MEDICINE

## 2017-04-25 PROCEDURE — 0 DIATRIZOATE MEGLUMINE & SODIUM PER 1 ML: Performed by: INTERNAL MEDICINE

## 2017-04-25 PROCEDURE — 25010000002 ONDANSETRON PER 1 MG: Performed by: EMERGENCY MEDICINE

## 2017-04-25 PROCEDURE — 85014 HEMATOCRIT: CPT | Performed by: INTERNAL MEDICINE

## 2017-04-25 RX ORDER — ONDANSETRON 4 MG/1
4 TABLET, FILM COATED ORAL EVERY 6 HOURS PRN
Status: DISCONTINUED | OUTPATIENT
Start: 2017-04-25 | End: 2017-04-26 | Stop reason: HOSPADM

## 2017-04-25 RX ORDER — ONDANSETRON 4 MG/1
4 TABLET, ORALLY DISINTEGRATING ORAL EVERY 6 HOURS PRN
Status: DISCONTINUED | OUTPATIENT
Start: 2017-04-25 | End: 2017-04-26 | Stop reason: HOSPADM

## 2017-04-25 RX ORDER — NITROGLYCERIN 0.4 MG/1
0.4 TABLET SUBLINGUAL
Status: DISCONTINUED | OUTPATIENT
Start: 2017-04-25 | End: 2017-04-26 | Stop reason: HOSPADM

## 2017-04-25 RX ORDER — ONDANSETRON 2 MG/ML
4 INJECTION INTRAMUSCULAR; INTRAVENOUS ONCE
Status: COMPLETED | OUTPATIENT
Start: 2017-04-25 | End: 2017-04-25

## 2017-04-25 RX ORDER — OCTREOTIDE ACETATE 50 UG/ML
50 INJECTION, SOLUTION INTRAVENOUS; SUBCUTANEOUS ONCE
Status: COMPLETED | OUTPATIENT
Start: 2017-04-25 | End: 2017-04-25

## 2017-04-25 RX ORDER — ROSUVASTATIN CALCIUM 5 MG/1
5 TABLET, COATED ORAL NIGHTLY
Status: DISCONTINUED | OUTPATIENT
Start: 2017-04-25 | End: 2017-04-26 | Stop reason: HOSPADM

## 2017-04-25 RX ORDER — ALPRAZOLAM 0.5 MG/1
0.5 TABLET ORAL 2 TIMES DAILY PRN
Status: DISCONTINUED | OUTPATIENT
Start: 2017-04-25 | End: 2017-04-26 | Stop reason: HOSPADM

## 2017-04-25 RX ORDER — SODIUM CHLORIDE 0.9 % (FLUSH) 0.9 %
1-10 SYRINGE (ML) INJECTION AS NEEDED
Status: DISCONTINUED | OUTPATIENT
Start: 2017-04-25 | End: 2017-04-26 | Stop reason: HOSPADM

## 2017-04-25 RX ORDER — CEFTRIAXONE SODIUM 1 G/50ML
1 INJECTION, SOLUTION INTRAVENOUS EVERY 24 HOURS
Status: DISCONTINUED | OUTPATIENT
Start: 2017-04-25 | End: 2017-04-26

## 2017-04-25 RX ORDER — NICOTINE POLACRILEX 4 MG
15 LOZENGE BUCCAL
Status: DISCONTINUED | OUTPATIENT
Start: 2017-04-25 | End: 2017-04-26 | Stop reason: HOSPADM

## 2017-04-25 RX ORDER — SPIRONOLACTONE 100 MG/1
100 TABLET, FILM COATED ORAL DAILY
Status: DISCONTINUED | OUTPATIENT
Start: 2017-04-25 | End: 2017-04-26 | Stop reason: HOSPADM

## 2017-04-25 RX ORDER — CHOLECALCIFEROL (VITAMIN D3) 125 MCG
1000 CAPSULE ORAL DAILY
Status: DISCONTINUED | OUTPATIENT
Start: 2017-04-25 | End: 2017-04-26 | Stop reason: HOSPADM

## 2017-04-25 RX ORDER — PANTOPRAZOLE SODIUM 40 MG/1
40 TABLET, DELAYED RELEASE ORAL
Status: DISCONTINUED | OUTPATIENT
Start: 2017-04-26 | End: 2017-04-26 | Stop reason: HOSPADM

## 2017-04-25 RX ORDER — PRAVASTATIN SODIUM 40 MG
40 TABLET ORAL DAILY
Status: DISCONTINUED | OUTPATIENT
Start: 2017-04-25 | End: 2017-04-25 | Stop reason: CLARIF

## 2017-04-25 RX ORDER — ONDANSETRON 2 MG/ML
4 INJECTION INTRAMUSCULAR; INTRAVENOUS EVERY 6 HOURS PRN
Status: DISCONTINUED | OUTPATIENT
Start: 2017-04-25 | End: 2017-04-26 | Stop reason: HOSPADM

## 2017-04-25 RX ORDER — OXYCODONE HYDROCHLORIDE 15 MG/1
7.5 TABLET ORAL EVERY 8 HOURS PRN
Status: DISCONTINUED | OUTPATIENT
Start: 2017-04-25 | End: 2017-04-26 | Stop reason: HOSPADM

## 2017-04-25 RX ORDER — SUCRALFATE 1 G/1
1 TABLET ORAL 2 TIMES DAILY
Status: DISCONTINUED | OUTPATIENT
Start: 2017-04-25 | End: 2017-04-26 | Stop reason: HOSPADM

## 2017-04-25 RX ORDER — LEVETIRACETAM 500 MG/1
500 TABLET ORAL NIGHTLY
Status: DISCONTINUED | OUTPATIENT
Start: 2017-04-25 | End: 2017-04-26 | Stop reason: HOSPADM

## 2017-04-25 RX ORDER — DEXTROSE MONOHYDRATE 25 G/50ML
25 INJECTION, SOLUTION INTRAVENOUS
Status: DISCONTINUED | OUTPATIENT
Start: 2017-04-25 | End: 2017-04-26 | Stop reason: HOSPADM

## 2017-04-25 RX ORDER — FERROUS SULFATE 325(65) MG
325 TABLET ORAL 2 TIMES DAILY WITH MEALS
Status: DISCONTINUED | OUTPATIENT
Start: 2017-04-25 | End: 2017-04-26 | Stop reason: HOSPADM

## 2017-04-25 RX ORDER — MORPHINE SULFATE 2 MG/ML
2 INJECTION, SOLUTION INTRAMUSCULAR; INTRAVENOUS EVERY 4 HOURS PRN
Status: DISCONTINUED | OUTPATIENT
Start: 2017-04-25 | End: 2017-04-26 | Stop reason: HOSPADM

## 2017-04-25 RX ORDER — FOLIC ACID 1 MG/1
1 TABLET ORAL DAILY
Status: DISCONTINUED | OUTPATIENT
Start: 2017-04-25 | End: 2017-04-26 | Stop reason: HOSPADM

## 2017-04-25 RX ORDER — CYCLOSPORINE 0.5 MG/ML
1 EMULSION OPHTHALMIC 2 TIMES DAILY
Status: DISCONTINUED | OUTPATIENT
Start: 2017-04-25 | End: 2017-04-26 | Stop reason: HOSPADM

## 2017-04-25 RX ADMIN — SUCRALFATE 1 G: 1 TABLET ORAL at 08:46

## 2017-04-25 RX ADMIN — INSULIN ASPART 5 UNITS: 100 INJECTION, SOLUTION INTRAVENOUS; SUBCUTANEOUS at 12:51

## 2017-04-25 RX ADMIN — INSULIN ASPART 20 UNITS: 100 INJECTION, SOLUTION INTRAVENOUS; SUBCUTANEOUS at 12:51

## 2017-04-25 RX ADMIN — PREGABALIN 75 MG: 50 CAPSULE ORAL at 08:46

## 2017-04-25 RX ADMIN — ONDANSETRON 4 MG: 2 INJECTION INTRAMUSCULAR; INTRAVENOUS at 21:28

## 2017-04-25 RX ADMIN — IOPAMIDOL 85 ML: 612 INJECTION, SOLUTION INTRAVENOUS at 15:02

## 2017-04-25 RX ADMIN — FERROUS SULFATE TAB 325 MG (65 MG ELEMENTAL FE) 325 MG: 325 (65 FE) TAB at 17:42

## 2017-04-25 RX ADMIN — FOLIC ACID 1 MG: 1 TABLET ORAL at 08:46

## 2017-04-25 RX ADMIN — SUCRALFATE 1 G: 1 TABLET ORAL at 17:42

## 2017-04-25 RX ADMIN — INSULIN DETEMIR 40 UNITS: 100 INJECTION, SOLUTION SUBCUTANEOUS at 11:40

## 2017-04-25 RX ADMIN — OCTREOTIDE ACETATE 25 MCG/HR: 500 INJECTION, SOLUTION INTRAVENOUS; SUBCUTANEOUS at 03:35

## 2017-04-25 RX ADMIN — ONDANSETRON 4 MG: 2 INJECTION INTRAMUSCULAR; INTRAVENOUS at 15:29

## 2017-04-25 RX ADMIN — DIATRIZOATE MEGLUMINE AND DIATRIZOATE SODIUM 30 ML: 600; 100 SOLUTION ORAL; RECTAL at 13:33

## 2017-04-25 RX ADMIN — OXYCODONE HYDROCHLORIDE 7.5 MG: 15 TABLET ORAL at 05:56

## 2017-04-25 RX ADMIN — CEFTRIAXONE SODIUM 1 G: 1 INJECTION, SOLUTION INTRAVENOUS at 12:51

## 2017-04-25 RX ADMIN — INSULIN ASPART 5 UNITS: 100 INJECTION, SOLUTION INTRAVENOUS; SUBCUTANEOUS at 17:43

## 2017-04-25 RX ADMIN — ROSUVASTATIN CALCIUM 5 MG: 5 TABLET, FILM COATED ORAL at 21:27

## 2017-04-25 RX ADMIN — SPIRONOLACTONE 100 MG: 100 TABLET, FILM COATED ORAL at 08:46

## 2017-04-25 RX ADMIN — DULOXETINE HYDROCHLORIDE 90 MG: 60 CAPSULE, DELAYED RELEASE ORAL at 08:46

## 2017-04-25 RX ADMIN — PANTOPRAZOLE SODIUM 8 MG/HR: 40 INJECTION, POWDER, FOR SOLUTION INTRAVENOUS at 08:47

## 2017-04-25 RX ADMIN — CYCLOSPORINE 1 DROP: 0.5 EMULSION OPHTHALMIC at 09:06

## 2017-04-25 RX ADMIN — ONDANSETRON 4 MG: 2 INJECTION INTRAMUSCULAR; INTRAVENOUS at 00:48

## 2017-04-25 RX ADMIN — PANTOPRAZOLE SODIUM 8 MG/HR: 40 INJECTION, POWDER, FOR SOLUTION INTRAVENOUS at 04:16

## 2017-04-25 RX ADMIN — OXYCODONE HYDROCHLORIDE 7.5 MG: 15 TABLET ORAL at 23:25

## 2017-04-25 RX ADMIN — OCTREOTIDE ACETATE 50 MCG: 50 INJECTION, SOLUTION INTRAVENOUS; SUBCUTANEOUS at 03:35

## 2017-04-25 RX ADMIN — FERROUS SULFATE TAB 325 MG (65 MG ELEMENTAL FE) 325 MG: 325 (65 FE) TAB at 08:46

## 2017-04-25 RX ADMIN — ALPRAZOLAM 0.5 MG: 0.5 TABLET ORAL at 23:25

## 2017-04-25 RX ADMIN — INSULIN ASPART 20 UNITS: 100 INJECTION, SOLUTION INTRAVENOUS; SUBCUTANEOUS at 17:43

## 2017-04-25 RX ADMIN — CYCLOSPORINE 1 DROP: 0.5 EMULSION OPHTHALMIC at 17:43

## 2017-04-25 RX ADMIN — SODIUM CHLORIDE 1000 ML: 9 INJECTION, SOLUTION INTRAVENOUS at 00:35

## 2017-04-25 RX ADMIN — Medication 1000 MCG: at 08:46

## 2017-04-25 RX ADMIN — RIFAXIMIN 550 MG: 550 TABLET ORAL at 21:27

## 2017-04-25 RX ADMIN — RIFAXIMIN 550 MG: 550 TABLET ORAL at 08:49

## 2017-04-25 RX ADMIN — OXYCODONE HYDROCHLORIDE 7.5 MG: 15 TABLET ORAL at 15:25

## 2017-04-25 RX ADMIN — MORPHINE SULFATE 4 MG: 4 INJECTION, SOLUTION INTRAMUSCULAR; INTRAVENOUS at 00:49

## 2017-04-25 RX ADMIN — ONDANSETRON 4 MG: 2 INJECTION INTRAMUSCULAR; INTRAVENOUS at 09:06

## 2017-04-25 RX ADMIN — INSULIN DETEMIR 40 UNITS: 100 INJECTION, SOLUTION SUBCUTANEOUS at 21:28

## 2017-04-25 RX ADMIN — LEVETIRACETAM 500 MG: 500 TABLET, FILM COATED ORAL at 21:27

## 2017-04-26 VITALS
RESPIRATION RATE: 16 BRPM | BODY MASS INDEX: 29.25 KG/M2 | HEART RATE: 89 BPM | DIASTOLIC BLOOD PRESSURE: 83 MMHG | TEMPERATURE: 98.2 F | WEIGHT: 182 LBS | OXYGEN SATURATION: 97 % | HEIGHT: 66 IN | SYSTOLIC BLOOD PRESSURE: 138 MMHG

## 2017-04-26 LAB
ANION GAP SERPL CALCULATED.3IONS-SCNC: 12 MMOL/L
BUN BLD-MCNC: 8 MG/DL (ref 6–20)
BUN/CREAT SERPL: 12.3 (ref 7–25)
CALCIUM SPEC-SCNC: 8.6 MG/DL (ref 8.6–10.5)
CHLORIDE SERPL-SCNC: 102 MMOL/L (ref 98–107)
CO2 SERPL-SCNC: 25 MMOL/L (ref 22–29)
CREAT BLD-MCNC: 0.65 MG/DL (ref 0.57–1)
GFR SERPL CREATININE-BSD FRML MDRD: 95 ML/MIN/1.73
GLUCOSE BLD-MCNC: 184 MG/DL (ref 65–99)
GLUCOSE BLDC GLUCOMTR-MCNC: 197 MG/DL (ref 70–130)
GLUCOSE BLDC GLUCOMTR-MCNC: 237 MG/DL (ref 70–130)
GLUCOSE BLDC GLUCOMTR-MCNC: 297 MG/DL (ref 70–130)
HCT VFR BLD AUTO: 32.7 % (ref 35.6–45.5)
HCT VFR BLD AUTO: 33.4 % (ref 35.6–45.5)
HCT VFR BLD AUTO: 33.7 % (ref 35.6–45.5)
HGB BLD-MCNC: 10.7 G/DL (ref 11.9–15.5)
HGB BLD-MCNC: 11 G/DL (ref 11.9–15.5)
HGB BLD-MCNC: 11 G/DL (ref 11.9–15.5)
POTASSIUM BLD-SCNC: 4 MMOL/L (ref 3.5–5.2)
SODIUM BLD-SCNC: 139 MMOL/L (ref 136–145)

## 2017-04-26 PROCEDURE — 99255 IP/OBS CONSLTJ NEW/EST HI 80: CPT | Performed by: INTERNAL MEDICINE

## 2017-04-26 PROCEDURE — 93005 ELECTROCARDIOGRAM TRACING: CPT | Performed by: INTERNAL MEDICINE

## 2017-04-26 PROCEDURE — 63710000001 INSULIN ASPART PER 5 UNITS: Performed by: INTERNAL MEDICINE

## 2017-04-26 PROCEDURE — 85018 HEMOGLOBIN: CPT | Performed by: INTERNAL MEDICINE

## 2017-04-26 PROCEDURE — 80048 BASIC METABOLIC PNL TOTAL CA: CPT | Performed by: INTERNAL MEDICINE

## 2017-04-26 PROCEDURE — 93010 ELECTROCARDIOGRAM REPORT: CPT | Performed by: INTERNAL MEDICINE

## 2017-04-26 PROCEDURE — 90791 PSYCH DIAGNOSTIC EVALUATION: CPT

## 2017-04-26 PROCEDURE — 82962 GLUCOSE BLOOD TEST: CPT

## 2017-04-26 PROCEDURE — 25010000002 ONDANSETRON PER 1 MG: Performed by: INTERNAL MEDICINE

## 2017-04-26 PROCEDURE — 25010000003 CEFTRIAXONE PER 250 MG: Performed by: INTERNAL MEDICINE

## 2017-04-26 PROCEDURE — 85014 HEMATOCRIT: CPT | Performed by: INTERNAL MEDICINE

## 2017-04-26 RX ADMIN — INSULIN ASPART 20 UNITS: 100 INJECTION, SOLUTION INTRAVENOUS; SUBCUTANEOUS at 08:26

## 2017-04-26 RX ADMIN — RIFAXIMIN 550 MG: 550 TABLET ORAL at 08:26

## 2017-04-26 RX ADMIN — OXYCODONE HYDROCHLORIDE 7.5 MG: 15 TABLET ORAL at 09:06

## 2017-04-26 RX ADMIN — PANTOPRAZOLE SODIUM 40 MG: 40 TABLET, DELAYED RELEASE ORAL at 05:53

## 2017-04-26 RX ADMIN — ALPRAZOLAM 0.5 MG: 0.5 TABLET ORAL at 14:01

## 2017-04-26 RX ADMIN — DULOXETINE HYDROCHLORIDE 90 MG: 60 CAPSULE, DELAYED RELEASE ORAL at 08:25

## 2017-04-26 RX ADMIN — INSULIN DETEMIR 40 UNITS: 100 INJECTION, SOLUTION SUBCUTANEOUS at 08:26

## 2017-04-26 RX ADMIN — INSULIN ASPART 8 UNITS: 100 INJECTION, SOLUTION INTRAVENOUS; SUBCUTANEOUS at 12:32

## 2017-04-26 RX ADMIN — INSULIN ASPART 3 UNITS: 100 INJECTION, SOLUTION INTRAVENOUS; SUBCUTANEOUS at 08:28

## 2017-04-26 RX ADMIN — FERROUS SULFATE TAB 325 MG (65 MG ELEMENTAL FE) 325 MG: 325 (65 FE) TAB at 08:26

## 2017-04-26 RX ADMIN — SPIRONOLACTONE 100 MG: 100 TABLET, FILM COATED ORAL at 08:25

## 2017-04-26 RX ADMIN — INSULIN ASPART 20 UNITS: 100 INJECTION, SOLUTION INTRAVENOUS; SUBCUTANEOUS at 12:32

## 2017-04-26 RX ADMIN — CYCLOSPORINE 1 DROP: 0.5 EMULSION OPHTHALMIC at 09:02

## 2017-04-26 RX ADMIN — FOLIC ACID 1 MG: 1 TABLET ORAL at 08:25

## 2017-04-26 RX ADMIN — ONDANSETRON 4 MG: 2 INJECTION INTRAMUSCULAR; INTRAVENOUS at 05:53

## 2017-04-26 RX ADMIN — PREGABALIN 75 MG: 50 CAPSULE ORAL at 08:26

## 2017-04-26 RX ADMIN — SUCRALFATE 1 G: 1 TABLET ORAL at 08:26

## 2017-04-26 RX ADMIN — Medication 1000 MCG: at 08:25

## 2017-04-26 RX ADMIN — CEFTRIAXONE SODIUM 1 G: 1 INJECTION, SOLUTION INTRAVENOUS at 14:01

## 2017-05-02 DIAGNOSIS — R11.0 NAUSEA: ICD-10-CM

## 2017-05-02 RX ORDER — METOCLOPRAMIDE 5 MG/1
TABLET ORAL
Qty: 60 TABLET | Refills: 0 | Status: SHIPPED | OUTPATIENT
Start: 2017-05-02 | End: 2017-05-15 | Stop reason: SDUPTHER

## 2017-05-04 DIAGNOSIS — Z79.4 TYPE 2 DIABETES MELLITUS WITH COMPLICATION, WITH LONG-TERM CURRENT USE OF INSULIN (HCC): ICD-10-CM

## 2017-05-04 DIAGNOSIS — E11.8 TYPE 2 DIABETES MELLITUS WITH COMPLICATION, WITH LONG-TERM CURRENT USE OF INSULIN (HCC): ICD-10-CM

## 2017-05-04 RX ORDER — INSULIN ASPART 100 [IU]/ML
INJECTION, SOLUTION INTRAVENOUS; SUBCUTANEOUS
Qty: 30 ML | Refills: 5 | Status: SHIPPED | OUTPATIENT
Start: 2017-05-04 | End: 2017-12-16 | Stop reason: SDUPTHER

## 2017-05-10 ENCOUNTER — OFFICE VISIT (OUTPATIENT)
Dept: INTERNAL MEDICINE | Facility: CLINIC | Age: 57
End: 2017-05-10

## 2017-05-10 VITALS
WEIGHT: 170 LBS | BODY MASS INDEX: 27.44 KG/M2 | DIASTOLIC BLOOD PRESSURE: 78 MMHG | TEMPERATURE: 98.5 F | SYSTOLIC BLOOD PRESSURE: 130 MMHG | OXYGEN SATURATION: 98 % | HEART RATE: 110 BPM

## 2017-05-10 DIAGNOSIS — K92.2 UGI BLEED: Primary | ICD-10-CM

## 2017-05-10 LAB
HCT VFR BLD AUTO: 44.1 % (ref 35.6–45.5)
HGB BLD-MCNC: 14.3 G/DL (ref 11.9–15.5)

## 2017-05-10 PROCEDURE — 99213 OFFICE O/P EST LOW 20 MIN: CPT | Performed by: INTERNAL MEDICINE

## 2017-05-10 PROCEDURE — 36415 COLL VENOUS BLD VENIPUNCTURE: CPT | Performed by: INTERNAL MEDICINE

## 2017-05-11 DIAGNOSIS — G40.919 INTRACTABLE EPILEPSY WITHOUT STATUS EPILEPTICUS (HCC): ICD-10-CM

## 2017-05-11 RX ORDER — RIFAXIMIN 550 MG/1
TABLET ORAL
Qty: 60 TABLET | Refills: 4 | Status: SHIPPED | OUTPATIENT
Start: 2017-05-11 | End: 2017-10-14 | Stop reason: SDUPTHER

## 2017-05-11 RX ORDER — LEVETIRACETAM 500 MG/1
TABLET ORAL
Qty: 60 TABLET | Refills: 1 | Status: SHIPPED | OUTPATIENT
Start: 2017-05-11 | End: 2017-07-07 | Stop reason: SDUPTHER

## 2017-05-15 DIAGNOSIS — R11.0 NAUSEA: ICD-10-CM

## 2017-05-15 RX ORDER — METOCLOPRAMIDE 5 MG/1
TABLET ORAL
Qty: 60 TABLET | Refills: 2 | Status: SHIPPED | OUTPATIENT
Start: 2017-05-15 | End: 2017-07-03 | Stop reason: SDUPTHER

## 2017-05-31 ENCOUNTER — TELEPHONE (OUTPATIENT)
Dept: GASTROENTEROLOGY | Facility: CLINIC | Age: 57
End: 2017-05-31

## 2017-06-05 DIAGNOSIS — Z79.4 CONTROLLED TYPE 2 DIABETES MELLITUS WITH DIABETIC POLYNEUROPATHY, WITH LONG-TERM CURRENT USE OF INSULIN (HCC): ICD-10-CM

## 2017-06-05 DIAGNOSIS — E11.42 CONTROLLED TYPE 2 DIABETES MELLITUS WITH DIABETIC POLYNEUROPATHY, WITH LONG-TERM CURRENT USE OF INSULIN (HCC): ICD-10-CM

## 2017-06-05 RX ORDER — INSULIN GLARGINE 100 [IU]/ML
INJECTION, SOLUTION SUBCUTANEOUS
Qty: 10 PEN | Refills: 2 | Status: SHIPPED | OUTPATIENT
Start: 2017-06-05 | End: 2017-07-25 | Stop reason: SDUPTHER

## 2017-06-19 ENCOUNTER — RESULTS ENCOUNTER (OUTPATIENT)
Dept: ENDOCRINOLOGY | Age: 57
End: 2017-06-19

## 2017-06-19 DIAGNOSIS — E78.5 HYPERLIPIDEMIA, UNSPECIFIED HYPERLIPIDEMIA TYPE: ICD-10-CM

## 2017-06-19 DIAGNOSIS — K75.81 NASH (NONALCOHOLIC STEATOHEPATITIS): ICD-10-CM

## 2017-06-19 DIAGNOSIS — IMO0002 UNCONTROLLED TYPE 2 DIABETES MELLITUS WITH COMPLICATION, WITH LONG-TERM CURRENT USE OF INSULIN: ICD-10-CM

## 2017-06-26 DIAGNOSIS — R25.2 MUSCLE CRAMPS: ICD-10-CM

## 2017-06-26 RX ORDER — CYCLOBENZAPRINE HCL 5 MG
TABLET ORAL
Qty: 30 TABLET | Refills: 1 | Status: SHIPPED | OUTPATIENT
Start: 2017-06-26 | End: 2017-06-28 | Stop reason: SDUPTHER

## 2017-06-27 ENCOUNTER — OFFICE VISIT (OUTPATIENT)
Dept: GASTROENTEROLOGY | Facility: CLINIC | Age: 57
End: 2017-06-27

## 2017-06-27 VITALS
WEIGHT: 168 LBS | DIASTOLIC BLOOD PRESSURE: 91 MMHG | HEART RATE: 94 BPM | BODY MASS INDEX: 27 KG/M2 | HEIGHT: 66 IN | SYSTOLIC BLOOD PRESSURE: 127 MMHG

## 2017-06-27 DIAGNOSIS — K75.81 NASH (NONALCOHOLIC STEATOHEPATITIS): ICD-10-CM

## 2017-06-27 DIAGNOSIS — R11.0 NAUSEA: ICD-10-CM

## 2017-06-27 DIAGNOSIS — K74.60 CIRRHOSIS OF LIVER WITHOUT ASCITES, UNSPECIFIED HEPATIC CIRRHOSIS TYPE (HCC): Primary | ICD-10-CM

## 2017-06-27 DIAGNOSIS — I85.11 SECONDARY ESOPHAGEAL VARICES WITH BLEEDING (HCC): ICD-10-CM

## 2017-06-27 PROCEDURE — 99213 OFFICE O/P EST LOW 20 MIN: CPT | Performed by: INTERNAL MEDICINE

## 2017-06-27 RX ORDER — PROMETHAZINE HYDROCHLORIDE 25 MG/1
25 TABLET ORAL EVERY 6 HOURS PRN
Qty: 30 TABLET | Refills: 2 | Status: SHIPPED | OUTPATIENT
Start: 2017-06-27 | End: 2017-12-20 | Stop reason: HOSPADM

## 2017-06-27 NOTE — PROGRESS NOTES
Subjective   Silvia Zabala is a 56 y.o. female is here today for follow-up.  Chief Complaint   Patient presents with   • Cirrhosis   • Abdominal Pain   • Vomiting       History of Present Illness  She is doing pretty well recently.  She successfully underwent cataract surgery without complication.  Her hemoglobin last month was above 14.  She has no hematemesis melena or hematochezia.  Her level of energy is good.  Domestic situation has settled down for now.  He had bouts of nausea but this seems to parallel headaches which on description sound migrainous to me.  The following portions of the patient's history were reviewed and updated as appropriate: allergies, current medications, past family history, past medical history, past social history, past surgical history and problem list.    Review of Systems   Respiratory: Negative for shortness of breath.    Cardiovascular: Negative for chest pain.   All other systems reviewed and are negative.      Objective   Physical Exam   Constitutional: She appears well-developed and well-nourished.   Nursing note and vitals reviewed.        Assessment/Plan   Problems Addressed this Visit        Cardiovascular and Mediastinum    Secondary esophageal varices with bleeding    Relevant Orders    CBC & Differential    Comprehensive Metabolic Panel       Digestive    Cirrhosis of liver - Primary    Relevant Orders    CBC & Differential    Comprehensive Metabolic Panel    Nausea    Relevant Orders    CBC & Differential    Comprehensive Metabolic Panel    DUKES (nonalcoholic steatohepatitis)    Relevant Orders    CBC & Differential    Comprehensive Metabolic Panel        We will update her blood work and otherwise continue current therapy.  I will see her back in 3 months.  She will be due for EGD later this year.

## 2017-06-28 DIAGNOSIS — R25.2 MUSCLE CRAMPS: ICD-10-CM

## 2017-06-28 RX ORDER — CYCLOBENZAPRINE HCL 5 MG
5 TABLET ORAL 2 TIMES DAILY PRN
Qty: 30 TABLET | Refills: 1 | Status: SHIPPED | OUTPATIENT
Start: 2017-06-28 | End: 2017-09-19 | Stop reason: SDUPTHER

## 2017-07-03 ENCOUNTER — OFFICE VISIT (OUTPATIENT)
Dept: ORTHOPEDIC SURGERY | Facility: CLINIC | Age: 57
End: 2017-07-03

## 2017-07-03 VITALS — WEIGHT: 172 LBS | TEMPERATURE: 98.8 F | BODY MASS INDEX: 27 KG/M2 | HEIGHT: 67 IN

## 2017-07-03 DIAGNOSIS — M25.511 ACUTE PAIN OF RIGHT SHOULDER: Primary | ICD-10-CM

## 2017-07-03 DIAGNOSIS — M75.101 TEAR OF RIGHT ROTATOR CUFF, UNSPECIFIED TEAR EXTENT: ICD-10-CM

## 2017-07-03 DIAGNOSIS — R11.0 NAUSEA: ICD-10-CM

## 2017-07-03 PROCEDURE — 99203 OFFICE O/P NEW LOW 30 MIN: CPT | Performed by: ORTHOPAEDIC SURGERY

## 2017-07-03 PROCEDURE — 73030 X-RAY EXAM OF SHOULDER: CPT | Performed by: ORTHOPAEDIC SURGERY

## 2017-07-03 NOTE — PROGRESS NOTES
"   New right Shoulder      Patient: Silvia Zabala        YOB: 1960    Medical Record Number: [id number]        Chief Complaints: Right Shoulder Pain  Chief Complaint   Patient presents with   • Right Shoulder - Establish Care           History of Present Illness: This is a  56 y.o. female who presents with Complaints of right shoulder pain.  She is right-hand-dominant is been ongoing for several years no one history injury change in activity that she can recall.  She does have rheumatoid arthritis in his had several steroid shots by Dr. Munoz the last of which was in February.  I gave her very minimal relief.  No history of injury change in activity recently.  Her symptoms are severe intermittent grinding stabbing with clicking popping snapping is referred here by her primary care DrGage Pitts.  She is disabled.  Past medical history is complicated are remarkable for diabetes seizures sleep apnea history of stroke Lupus anemia rheumatoid arthritis liver disease stomach ulcers and depression anxiety.          Allergies:   Allergies   Allergen Reactions   • Albuterol Anaphylaxis   • Quinine Derivatives Nausea And Vomiting   • Tramadol Other (See Comments)     Per pt causes her \"palsy, meaning shaking and tremors\" and gi upset, nausea       Medications:   Home Medications:  Current Outpatient Prescriptions on File Prior to Visit   Medication Sig   • ALPRAZolam (XANAX) 0.5 MG tablet Take 1 tablet by mouth 2 (Two) Times a Day As Needed for anxiety.   • cholecalciferol (VITAMIN D3) 1000 UNITS tablet Take 1,000 Units by mouth Daily.   • cyclobenzaprine (FLEXERIL) 5 MG tablet Take 1 tablet by mouth 2 (Two) Times a Day As Needed for Muscle Spasms.   • cycloSPORINE (RESTASIS) 0.05 % ophthalmic emulsion 1 drop 2 (two) times a day.   • diphenhydrAMINE (BENADRYL) 25 mg capsule Take 25 mg by mouth Every 6 (Six) Hours As Needed.   • docusate sodium (COLACE) 100 MG capsule Take 100 mg by mouth as needed for " constipation.   • DULoxetine (CYMBALTA) 30 MG capsule Take 3 capsules daily (Patient taking differently: 90 mg Daily. Take 3 capsules daily)   • Etanercept 50 MG/ML solution auto-injector Inject  under the skin 1 (One) Time Per Week.   • ferrous sulfate 325 (65 FE) MG tablet TAKE ONE TABLET BY MOUTH TWICE A DAY   • folic acid (FOLVITE) 1 MG tablet TAKE ONE TABLET BY MOUTH DAILY   • glucose blood (ONE TOUCH ULTRA TEST) test strip Test blood sugar 4 times daily   • Insulin Pen Needle 31G X 5 MM misc To inject 5 -6 times per day   • LANTUS SOLOSTAR 100 UNIT/ML injection pen INJECT 80 UNITS SUBCUTANEOUSLY ONCE DAILY   • levETIRAcetam (KEPPRA) 500 MG tablet TAKE ONE TABLET BY MOUTH TWICE A DAY   • LYRICA 75 MG capsule TAKE ONE CAPSULE BY MOUTH TWICE A DAY   • MELATONIN PO Take 10 mg by mouth Daily.   • metFORMIN (GLUCOPHAGE) 1000 MG tablet Take 1 tablet by mouth 2 (Two) Times a Day With Meals. (Patient taking differently: Take 1,000 mg by mouth 2 (Two) Times a Day.)   • metoclopramide (REGLAN) 5 MG tablet TAKE TABLET BY MOUTH BEFORE MEALS AND EVERY NIGHT AT BEDTIME  **MUST CALL MD FOR APPOINTMENT   • Multiple Vitamin (MULTI-VITAMIN PO) Take 1 tablet by mouth Daily.   • NOVOLOG FLEXPEN 100 UNIT/ML solution pen-injector sc pen INJECT 40 UNITS UNDER THE SKIN BEFORE EACH MEAL AND 25 units before SNACKS. PA APPROVED   • OxyCODONE HCl (OXAYDO) 7.5 MG tablet  Take 7.5 mg by mouth Every 8 (Eight) Hours As Needed.   • pantoprazole (PROTONIX) 40 MG EC tablet Take 40 mg by mouth daily.   • pravastatin (PRAVACHOL) 40 MG tablet Take 1 tablet by mouth Daily.   • promethazine (PHENERGAN) 25 MG tablet Take 1 tablet by mouth Every 6 (Six) Hours As Needed for Nausea or Vomiting.   • spironolactone (ALDACTONE) 100 MG tablet Take 1 tablet by mouth Daily.   • sucralfate (CARAFATE) 1 G tablet Take 1 tablet by mouth 2 (two) times a day.   • vitamin B-12 (CYANOCOBALAMIN) 1000 MCG tablet Take 1,000 mcg by mouth daily.   • XIFAXAN 550 MG tablet  TAKE ONE TABLET BY MOUTH TWICE A DAY     No current facility-administered medications on file prior to visit.      Current Medications:  Scheduled Meds:  Continuous Infusions:  No current facility-administered medications for this visit.   PRN Meds:.    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   • Cirrhosis    • Colon polyps    • Deafness    • Depression    • Diabetes mellitus     Adult onset, insulin requiring   • Duodenal ulcer with hemorrhage    • Fibromyalgia    • Gallbladder disease    • Gastroparesis    • Glaucoma    • Granuloma annulare    • H/O B12 deficiency    • H/O Bleeding ulcer     And gastroesophageal varices   • H/O mixed connective tissue disorder    • Hemorrhoids    • Hiatal hernia    • Hx of being hospitalized     In [place] for GI bleeding from ulcer and varices   • Hyperlipidemia    • Migraine    • BRICE (obstructive sleep apnea)    • Pancreas divisum    • Pancytopenia     Chronic, due to cirrhosis and hypersplenism   • Peptic ulcer disease     And esophageal varices   • PONV (postoperative nausea and vomiting)    • RA (rheumatoid arthritis)    • Seizure disorder     LAST ONE SEVERAL YEARS AGO   • Seizures    • Systemic lupus         Past Surgical History:   Procedure Laterality Date   • CATARACT EXTRACTION     • CHOLECYSTECTOMY     • ENDOSCOPY N/A 11/7/2016    Procedure: ESOPHAGOGASTRODUODENOSCOPY WITH COLD POLYPECTOMY, BANDING,  AND CLIPS TIMES 2;  Surgeon: Rich Coleman MD;  Location: [facility] ENDOSCOPY;  Service:    • ENDOSCOPY N/A 12/22/2016    Procedure: ESOPHAGOGASTRODUODENOSCOPY WITH ESOPHAGEAL BANDING. 5 BANDS FIRED, 4 BANDS ADHERERD TO MUCOSA;  Surgeon: Rich Coleman MD;  Location: [facility] ENDOSCOPY;  Service:    • ENDOSCOPY N/A 2/15/2017    Procedure: ESOPHAGOGASTRODUODENOSCOPY AT BEDSIDE with  "esophageal varices banding (3);  Surgeon: Rich Coleman MD;  Location: [facility] ENDOSCOPY;  Service:    • ENDOSCOPY N/A 4/6/2017    Procedure: ESOPHAGOGASTRODUODENOSCOPY WITH ESOPHAGEAL VARICES BANDING x2;  Surgeon: Rich Coleman MD;  Location: [facility] ENDOSCOPY;  Service:    • ENDOSCOPY AND COLONOSCOPY  2014    Dr. Rich Coleman with findings of candida esophagitis   • EYE SURGERY     • HYSTERECTOMY     • JOINT REPLACEMENT     • KNEE SURGERY Right 1995    \"right knee recontstruction\"   • LIVER BIOPSY  01/2015   • STOMACH SURGERY          Social History     Occupational History   •  Disabled     Social History Main Topics   • Smoking status: Former Smoker     Packs/day: 0.00     Years: 0.00     Quit date: 12/17/2015   • Smokeless tobacco: Never Used   • Alcohol use No   • Drug use: No   • Sexual activity: Defer    Social History     Social History Narrative    Moved to [place] from [place]. She is currently medically disabled.        Family History   Problem Relation Age of Onset   • Liver disease Other    • Depression Other    • Heart disease Other    • Hypertension Other    • Diabetes Other    • Colon cancer Maternal Aunt    • Hypertension Mother    • Diabetes type II Mother    • Rheum arthritis Mother    • Liver disease Mother      DUKES   • Heart disease Father    • Diabetes type II Father    • Diabetes type II Sister    • Lupus Sister              Review of Systems: 14 point review of systems are remarkable for the pertinent positives listed in chart by the patient the remainder are negative    Review of Systems      Physical Exam: 56 y.o. female  General Appearance:    Alert, cooperative, in no acute distress                 Vitals:    07/03/17 1448   Temp: 98.8 °F (37.1 °C)   TempSrc: Temporal Artery    Weight: 172 lb (78 kg)   Height: 66.5\" (168.9 cm)      Patient is alert and read ×3 no acute distress appears her above-listed at height weight and age.  Affect is normal respiratory rate is normal " unlabored. Heart rate regular rate rhythm, sclera, dentition and hearing are normal for the purpose of this exam.    Ortho Exam Physical exam of the right shoulder reveals no overlying skin changes no lymphedema no lymphadenopathy.  Patient has active flexion 180 with mild symptoms abduction is similar external rotation is to 50 and internal rotation to the upper lumbar spine with mild symptoms.  Patient has good rotator cuff strength 4- 4+ over 5 with isometric strength testing with pain.  Patient has a positive impingement and a positive Damian sign.  Patient has good cervical range of motion which is full and asymptomatic no radicular symptoms.  Patient has a normal elbow exam.  Good distal pulses are present  Patient has pain with overhead activity and a positive Neer sign and a positive empty can sign    Procedures          Radiology:   AP, Scapular Y and Axillary Lateral of the Right shoulder were ordered/reviewed to evauate shoulder pain.  I've no comparative films these show some acromioclavicular arthritis as well as sclerosis and some small cysts at the insertion of the rotator cuff.    Assessment/Plan:      Right shoulder pain.  She's had injections several in the past by Dr. Munoz her rheumatologist.  She has a history of rheumatoid arthritis.  I'm highly suspicious of rotator cuff tear plan is to proceed with an MRI and we will proceed based on the results of

## 2017-07-05 RX ORDER — METOCLOPRAMIDE 5 MG/1
TABLET ORAL
Qty: 60 TABLET | Refills: 1 | Status: SHIPPED | OUTPATIENT
Start: 2017-07-05 | End: 2017-08-02 | Stop reason: SDUPTHER

## 2017-07-07 DIAGNOSIS — G40.919 INTRACTABLE EPILEPSY WITHOUT STATUS EPILEPTICUS (HCC): ICD-10-CM

## 2017-07-07 RX ORDER — LEVETIRACETAM 500 MG/1
TABLET ORAL
Qty: 60 TABLET | Refills: 0 | Status: SHIPPED | OUTPATIENT
Start: 2017-07-07 | End: 2017-08-02 | Stop reason: SDUPTHER

## 2017-07-12 ENCOUNTER — OFFICE VISIT (OUTPATIENT)
Dept: ORTHOPEDIC SURGERY | Facility: CLINIC | Age: 57
End: 2017-07-12

## 2017-07-12 DIAGNOSIS — M75.101 TEAR OF RIGHT ROTATOR CUFF, UNSPECIFIED TEAR EXTENT: ICD-10-CM

## 2017-07-12 DIAGNOSIS — M25.511 ACUTE PAIN OF RIGHT SHOULDER: ICD-10-CM

## 2017-07-12 PROCEDURE — 73221 MRI JOINT UPR EXTREM W/O DYE: CPT | Performed by: ORTHOPAEDIC SURGERY

## 2017-07-14 ENCOUNTER — TELEPHONE (OUTPATIENT)
Dept: GASTROENTEROLOGY | Facility: CLINIC | Age: 57
End: 2017-07-14

## 2017-07-25 DIAGNOSIS — E11.42 CONTROLLED TYPE 2 DIABETES MELLITUS WITH DIABETIC POLYNEUROPATHY, WITH LONG-TERM CURRENT USE OF INSULIN (HCC): ICD-10-CM

## 2017-07-25 DIAGNOSIS — Z79.4 CONTROLLED TYPE 2 DIABETES MELLITUS WITH DIABETIC POLYNEUROPATHY, WITH LONG-TERM CURRENT USE OF INSULIN (HCC): ICD-10-CM

## 2017-07-25 RX ORDER — INSULIN GLARGINE 100 [IU]/ML
INJECTION, SOLUTION SUBCUTANEOUS
Qty: 20 PEN | Refills: 2 | Status: SHIPPED | OUTPATIENT
Start: 2017-07-25 | End: 2017-11-22

## 2017-08-02 ENCOUNTER — APPOINTMENT (OUTPATIENT)
Dept: LAB | Facility: HOSPITAL | Age: 57
End: 2017-08-02

## 2017-08-02 ENCOUNTER — TELEPHONE (OUTPATIENT)
Dept: INTERNAL MEDICINE | Facility: CLINIC | Age: 57
End: 2017-08-02

## 2017-08-02 ENCOUNTER — TELEPHONE (OUTPATIENT)
Dept: GASTROENTEROLOGY | Facility: CLINIC | Age: 57
End: 2017-08-02

## 2017-08-02 DIAGNOSIS — G40.919 INTRACTABLE EPILEPSY WITHOUT STATUS EPILEPTICUS (HCC): ICD-10-CM

## 2017-08-02 DIAGNOSIS — R11.0 NAUSEA: ICD-10-CM

## 2017-08-02 LAB
ALBUMIN SERPL-MCNC: 3.9 G/DL (ref 3.5–5.2)
ALBUMIN/GLOB SERPL: 1.1 G/DL
ALP SERPL-CCNC: 89 U/L (ref 39–117)
ALT SERPL W P-5'-P-CCNC: 24 U/L (ref 1–33)
ANION GAP SERPL CALCULATED.3IONS-SCNC: 14.6 MMOL/L
AST SERPL-CCNC: 19 U/L (ref 1–32)
BASOPHILS # BLD AUTO: 0.05 10*3/MM3 (ref 0–0.2)
BASOPHILS NFR BLD AUTO: 1.2 % (ref 0–1.5)
BILIRUB SERPL-MCNC: 0.6 MG/DL (ref 0.1–1.2)
BUN BLD-MCNC: 12 MG/DL (ref 6–20)
BUN/CREAT SERPL: 15.8 (ref 7–25)
CALCIUM SPEC-SCNC: 9.5 MG/DL (ref 8.6–10.5)
CHLORIDE SERPL-SCNC: 95 MMOL/L (ref 98–107)
CO2 SERPL-SCNC: 24.4 MMOL/L (ref 22–29)
CREAT BLD-MCNC: 0.76 MG/DL (ref 0.57–1)
DEPRECATED RDW RBC AUTO: 43.2 FL (ref 37–54)
EOSINOPHIL # BLD AUTO: 0.16 10*3/MM3 (ref 0–0.7)
EOSINOPHIL NFR BLD AUTO: 3.9 % (ref 0.3–6.2)
ERYTHROCYTE [DISTWIDTH] IN BLOOD BY AUTOMATED COUNT: 14.1 % (ref 11.7–13)
GFR SERPL CREATININE-BSD FRML MDRD: 79 ML/MIN/1.73
GLOBULIN UR ELPH-MCNC: 3.6 GM/DL
GLUCOSE BLD-MCNC: 440 MG/DL (ref 65–99)
HCT VFR BLD AUTO: 39.6 % (ref 35.6–45.5)
HGB BLD-MCNC: 13.8 G/DL (ref 11.9–15.5)
IMM GRANULOCYTES # BLD: 0.03 10*3/MM3 (ref 0–0.03)
IMM GRANULOCYTES NFR BLD: 0.7 % (ref 0–0.5)
LYMPHOCYTES # BLD AUTO: 1.42 10*3/MM3 (ref 0.9–4.8)
LYMPHOCYTES NFR BLD AUTO: 34.9 % (ref 19.6–45.3)
MCH RBC QN AUTO: 29.5 PG (ref 26.9–32)
MCHC RBC AUTO-ENTMCNC: 34.8 G/DL (ref 32.4–36.3)
MCV RBC AUTO: 84.6 FL (ref 80.5–98.2)
MONOCYTES # BLD AUTO: 0.33 10*3/MM3 (ref 0.2–1.2)
MONOCYTES NFR BLD AUTO: 8.1 % (ref 5–12)
NEUTROPHILS # BLD AUTO: 2.08 10*3/MM3 (ref 1.9–8.1)
NEUTROPHILS NFR BLD AUTO: 51.2 % (ref 42.7–76)
PLATELET # BLD AUTO: 73 10*3/MM3 (ref 140–500)
PMV BLD AUTO: 11.8 FL (ref 6–12)
POTASSIUM BLD-SCNC: 4 MMOL/L (ref 3.5–5.2)
PROT SERPL-MCNC: 7.5 G/DL (ref 6–8.5)
RBC # BLD AUTO: 4.68 10*6/MM3 (ref 3.9–5.2)
SODIUM BLD-SCNC: 134 MMOL/L (ref 136–145)
WBC NRBC COR # BLD: 4.07 10*3/MM3 (ref 4.5–10.7)

## 2017-08-02 PROCEDURE — 80053 COMPREHEN METABOLIC PANEL: CPT | Performed by: INTERNAL MEDICINE

## 2017-08-02 PROCEDURE — 36415 COLL VENOUS BLD VENIPUNCTURE: CPT | Performed by: INTERNAL MEDICINE

## 2017-08-02 PROCEDURE — 85025 COMPLETE CBC W/AUTO DIFF WBC: CPT | Performed by: INTERNAL MEDICINE

## 2017-08-02 RX ORDER — METOCLOPRAMIDE 5 MG/1
TABLET ORAL
Qty: 60 TABLET | Refills: 0 | Status: SHIPPED | OUTPATIENT
Start: 2017-08-02 | End: 2017-08-18 | Stop reason: SDUPTHER

## 2017-08-02 RX ORDER — LEVETIRACETAM 500 MG/1
TABLET ORAL
Qty: 60 TABLET | Refills: 0 | Status: SHIPPED | OUTPATIENT
Start: 2017-08-02 | End: 2017-09-02 | Stop reason: SDUPTHER

## 2017-08-02 RX ORDER — FOLIC ACID 1 MG/1
TABLET ORAL
Qty: 30 TABLET | Refills: 2 | Status: SHIPPED | OUTPATIENT
Start: 2017-08-02 | End: 2017-11-22

## 2017-08-02 NOTE — TELEPHONE ENCOUNTER
Sherie from [facility] lab called with critical value Glucose 440.  Called PCP office, spoke with Dr Blanca Pitts and notified her of Glucose 440.  Dr Pitts has been trying to get pt in to see endocrinologist due to her uncontrolled DM.  Dr Pitts will contact pt regarding abnormal glucose.

## 2017-08-03 NOTE — TELEPHONE ENCOUNTER
Discussed recent glucose of 440.  She has been under a lot of stress recently.  She has had cataract surgery.  She was on prednisone eyedrops for a while.  She has not been eating well.  Encouraged her to be more prudent with her diet, make sure she is continuing with her medications as recommended by her endocrinologist and to be sure she follows up with her endocrinologist.

## 2017-08-08 ENCOUNTER — OFFICE VISIT (OUTPATIENT)
Dept: ORTHOPEDIC SURGERY | Facility: CLINIC | Age: 57
End: 2017-08-08

## 2017-08-08 VITALS — BODY MASS INDEX: 27.32 KG/M2 | TEMPERATURE: 97.6 F | HEIGHT: 66 IN | WEIGHT: 170 LBS

## 2017-08-08 DIAGNOSIS — M25.511 CHRONIC RIGHT SHOULDER PAIN: Primary | ICD-10-CM

## 2017-08-08 DIAGNOSIS — G89.29 CHRONIC RIGHT SHOULDER PAIN: Primary | ICD-10-CM

## 2017-08-08 DIAGNOSIS — IMO0002 BURSITIS/TENDONITIS, SHOULDER: ICD-10-CM

## 2017-08-08 PROCEDURE — 20610 DRAIN/INJ JOINT/BURSA W/O US: CPT | Performed by: ORTHOPAEDIC SURGERY

## 2017-08-08 PROCEDURE — 99213 OFFICE O/P EST LOW 20 MIN: CPT | Performed by: ORTHOPAEDIC SURGERY

## 2017-08-08 RX ADMIN — BUPIVACAINE HYDROCHLORIDE 4 ML: 5 INJECTION, SOLUTION EPIDURAL; INTRACAUDAL at 14:52

## 2017-08-08 RX ADMIN — METHYLPREDNISOLONE ACETATE 80 MG: 80 INJECTION, SUSPENSION INTRA-ARTICULAR; INTRALESIONAL; INTRAMUSCULAR; SOFT TISSUE at 14:52

## 2017-08-08 NOTE — PROGRESS NOTES
"Right Shoulder MRI Follow Up      Patient: Silvia Zabala        YOB: 1960            Chief Complaints: Right Shoulder pain  Chief Complaint   Patient presents with   • Right Shoulder - Follow-up           History of Present Illness: The patient is here follow-up of an MRI of the shoulderSymptoms are about the same she would like to know her next options are.  She does have intermittent pain and some limitation of her activity      Physical Exam: 57 y.o. female  General Appearance:    Alert, cooperative, in no acute distress                   Vitals:    08/08/17 1430   Temp: 97.6 °F (36.4 °C)   TempSrc: Temporal Artery    Weight: 170 lb (77.1 kg)   Height: 66\" (167.6 cm)        Patient is alert and read ×3 no acute distress appears her above-listed at height weight and age.  Affect is normal respiratory rate is normal unlabored. Heart rate regular rate rhythm, sclera, dentition and hearing are normal for the purpose of this exam.      Ortho Exam  Physical exam of the right shoulder reveals no overlying skin changes no lymphedema no lymphadenopathy.  Patient has active flexion 180 with mild symptoms abduction is similar external rotation is to 50 and internal rotation to the upper lumbar spine with mild symptoms.  Patient has good rotator cuff strength 4+ over 5 with isometric strength testing with pain.  Patient has a positive impingement and a positive Damian sign.  Patient has good cervical range of motion which is full and asymptomatic no radicular symptoms.  Patient has a normal elbow exam.  Good distal pulses are present  Patient has pain with overhead activity and a positive Neer sign and a positive empty can sign    MRI Results: MRI shows some mild tendinopathy of the rotator cuff tendon no obvious tear she has a mild glenohumeral effusion.  I have reviewed the films and agree with the findings  Large Joint Arthrocentesis  Date/Time: 8/8/2017 2:52 PM  Consent given by: patient  Site marked: " site marked  Timeout: Immediately prior to procedure a time out was called to verify the correct patient, procedure, equipment, support staff and site/side marked as required   Supporting Documentation  Indications: pain   Procedure Details  Location: shoulder - R subacromial bursa  Preparation: Patient was prepped and draped in the usual sterile fashion  Needle size: 25 G  Approach: posterior  Medications administered: 4 mL bupivacaine (PF) 0.5 %; 80 mg methylPREDNISolone acetate 80 MG/ML  Patient tolerance: patient tolerated the procedure well with no immediate complications            Assessment/Plan:  Right shoulder pain I still think it's more impingement plan is to proceed with an injection physical therapy and have her follow-up in 3-4 weeks.

## 2017-08-14 RX ORDER — BUPIVACAINE HYDROCHLORIDE 5 MG/ML
4 INJECTION, SOLUTION EPIDURAL; INTRACAUDAL
Status: COMPLETED | OUTPATIENT
Start: 2017-08-08 | End: 2017-08-08

## 2017-08-14 RX ORDER — METHYLPREDNISOLONE ACETATE 80 MG/ML
80 INJECTION, SUSPENSION INTRA-ARTICULAR; INTRALESIONAL; INTRAMUSCULAR; SOFT TISSUE
Status: COMPLETED | OUTPATIENT
Start: 2017-08-08 | End: 2017-08-08

## 2017-08-18 DIAGNOSIS — R11.0 NAUSEA: ICD-10-CM

## 2017-08-18 RX ORDER — METOCLOPRAMIDE 5 MG/1
TABLET ORAL
Qty: 60 TABLET | Refills: 1 | Status: SHIPPED | OUTPATIENT
Start: 2017-08-18 | End: 2017-10-20

## 2017-09-01 RX ORDER — PREGABALIN 75 MG/1
75 CAPSULE ORAL 2 TIMES DAILY
Qty: 60 CAPSULE | Refills: 2 | OUTPATIENT
Start: 2017-09-01 | End: 2018-04-30 | Stop reason: SDUPTHER

## 2017-09-02 DIAGNOSIS — G40.919 INTRACTABLE EPILEPSY WITHOUT STATUS EPILEPTICUS (HCC): ICD-10-CM

## 2017-09-05 RX ORDER — LEVETIRACETAM 500 MG/1
TABLET ORAL
Qty: 60 TABLET | Refills: 1 | Status: SHIPPED | OUTPATIENT
Start: 2017-09-05 | End: 2017-11-22

## 2017-09-14 RX ORDER — FERROUS SULFATE 325(65) MG
TABLET ORAL
Qty: 60 TABLET | Refills: 3 | Status: SHIPPED | OUTPATIENT
Start: 2017-09-14 | End: 2017-11-22

## 2017-09-19 DIAGNOSIS — R25.2 MUSCLE CRAMPS: ICD-10-CM

## 2017-09-19 RX ORDER — CYCLOBENZAPRINE HCL 5 MG
TABLET ORAL
Qty: 30 TABLET | Refills: 0 | Status: SHIPPED | OUTPATIENT
Start: 2017-09-19 | End: 2017-11-09 | Stop reason: SDUPTHER

## 2017-09-28 DIAGNOSIS — F41.9 ANXIETY: ICD-10-CM

## 2017-09-29 RX ORDER — ALPRAZOLAM 0.5 MG/1
0.5 TABLET ORAL 2 TIMES DAILY PRN
Qty: 60 TABLET | Refills: 0 | OUTPATIENT
Start: 2017-09-29 | End: 2017-11-10 | Stop reason: SDUPTHER

## 2017-10-10 ENCOUNTER — APPOINTMENT (OUTPATIENT)
Dept: LAB | Facility: HOSPITAL | Age: 57
End: 2017-10-10

## 2017-10-10 ENCOUNTER — OFFICE VISIT (OUTPATIENT)
Dept: GASTROENTEROLOGY | Facility: CLINIC | Age: 57
End: 2017-10-10

## 2017-10-10 ENCOUNTER — TRANSCRIBE ORDERS (OUTPATIENT)
Dept: ADMINISTRATIVE | Facility: HOSPITAL | Age: 57
End: 2017-10-10

## 2017-10-10 VITALS
WEIGHT: 168 LBS | DIASTOLIC BLOOD PRESSURE: 78 MMHG | BODY MASS INDEX: 27 KG/M2 | HEIGHT: 66 IN | HEART RATE: 93 BPM | SYSTOLIC BLOOD PRESSURE: 142 MMHG

## 2017-10-10 DIAGNOSIS — R10.84 GENERALIZED ABDOMINAL PAIN: ICD-10-CM

## 2017-10-10 DIAGNOSIS — K74.60 CIRRHOSIS OF LIVER WITHOUT ASCITES, UNSPECIFIED HEPATIC CIRRHOSIS TYPE (HCC): Primary | ICD-10-CM

## 2017-10-10 LAB
ALBUMIN SERPL-MCNC: 3.6 G/DL (ref 3.5–5.2)
ALBUMIN/GLOB SERPL: 1.1 G/DL
ALP SERPL-CCNC: 106 U/L (ref 39–117)
ALT SERPL W P-5'-P-CCNC: 38 U/L (ref 1–33)
ANION GAP SERPL CALCULATED.3IONS-SCNC: 12.8 MMOL/L
AST SERPL-CCNC: 39 U/L (ref 1–32)
BASOPHILS # BLD AUTO: 0.02 10*3/MM3 (ref 0–0.2)
BASOPHILS NFR BLD AUTO: 0.6 % (ref 0–1.5)
BILIRUB SERPL-MCNC: 0.6 MG/DL (ref 0.1–1.2)
BUN BLD-MCNC: 6 MG/DL (ref 6–20)
BUN/CREAT SERPL: 7.9 (ref 7–25)
CALCIUM SPEC-SCNC: 9.2 MG/DL (ref 8.6–10.5)
CHLORIDE SERPL-SCNC: 103 MMOL/L (ref 98–107)
CO2 SERPL-SCNC: 24.2 MMOL/L (ref 22–29)
CREAT BLD-MCNC: 0.76 MG/DL (ref 0.57–1)
DEPRECATED RDW RBC AUTO: 49.3 FL (ref 37–54)
EOSINOPHIL # BLD AUTO: 0.09 10*3/MM3 (ref 0–0.7)
EOSINOPHIL NFR BLD AUTO: 2.5 % (ref 0.3–6.2)
ERYTHROCYTE [DISTWIDTH] IN BLOOD BY AUTOMATED COUNT: 15.3 % (ref 11.7–13)
GFR SERPL CREATININE-BSD FRML MDRD: 79 ML/MIN/1.73
GLOBULIN UR ELPH-MCNC: 3.4 GM/DL
GLUCOSE BLD-MCNC: 201 MG/DL (ref 65–99)
HCT VFR BLD AUTO: 38.1 % (ref 35.6–45.5)
HGB BLD-MCNC: 12.4 G/DL (ref 11.9–15.5)
IMM GRANULOCYTES # BLD: 0.02 10*3/MM3 (ref 0–0.03)
IMM GRANULOCYTES NFR BLD: 0.6 % (ref 0–0.5)
INR PPP: 1.21 (ref 0.9–1.1)
LYMPHOCYTES # BLD AUTO: 0.91 10*3/MM3 (ref 0.9–4.8)
LYMPHOCYTES NFR BLD AUTO: 25.1 % (ref 19.6–45.3)
MCH RBC QN AUTO: 29.1 PG (ref 26.9–32)
MCHC RBC AUTO-ENTMCNC: 32.5 G/DL (ref 32.4–36.3)
MCV RBC AUTO: 89.4 FL (ref 80.5–98.2)
MONOCYTES # BLD AUTO: 0.27 10*3/MM3 (ref 0.2–1.2)
MONOCYTES NFR BLD AUTO: 7.5 % (ref 5–12)
NEUTROPHILS # BLD AUTO: 2.31 10*3/MM3 (ref 1.9–8.1)
NEUTROPHILS NFR BLD AUTO: 63.7 % (ref 42.7–76)
PLATELET # BLD AUTO: 90 10*3/MM3 (ref 140–500)
PMV BLD AUTO: 11.4 FL (ref 6–12)
POTASSIUM BLD-SCNC: 4.6 MMOL/L (ref 3.5–5.2)
PROT SERPL-MCNC: 7 G/DL (ref 6–8.5)
PROTHROMBIN TIME: 14.9 SECONDS (ref 11.7–14.2)
RBC # BLD AUTO: 4.26 10*6/MM3 (ref 3.9–5.2)
SODIUM BLD-SCNC: 140 MMOL/L (ref 136–145)
WBC NRBC COR # BLD: 3.62 10*3/MM3 (ref 4.5–10.7)

## 2017-10-10 PROCEDURE — 82105 ALPHA-FETOPROTEIN SERUM: CPT | Performed by: INTERNAL MEDICINE

## 2017-10-10 PROCEDURE — 85025 COMPLETE CBC W/AUTO DIFF WBC: CPT | Performed by: INTERNAL MEDICINE

## 2017-10-10 PROCEDURE — 36415 COLL VENOUS BLD VENIPUNCTURE: CPT | Performed by: INTERNAL MEDICINE

## 2017-10-10 PROCEDURE — 99214 OFFICE O/P EST MOD 30 MIN: CPT | Performed by: INTERNAL MEDICINE

## 2017-10-10 PROCEDURE — 80053 COMPREHEN METABOLIC PANEL: CPT | Performed by: INTERNAL MEDICINE

## 2017-10-10 PROCEDURE — 85610 PROTHROMBIN TIME: CPT | Performed by: INTERNAL MEDICINE

## 2017-10-10 NOTE — PROGRESS NOTES
Subjective   Silvia Zabala is a 57 y.o. female is here today for follow-up.  Chief Complaint   Patient presents with   • Cirrhosis   • Abdominal Pain   • Nausea   • Vomiting   • GI Problem     Coffee Ground Stools       History of Present Illness  Over the last couple weeks patient has been pretty miserable.  She was advised by the transplant team to discontinue Reglan when she says ever since she is been very nauseated and is had upper abdominal pain.  She says that she passed coffee ground material per rectum but his had no bowel movements today.  She had this coffee ground passage one day ago.  His been not running any fever.  Her daughter lost a baby due to miscarriage.  The following portions of the patient's history were reviewed and updated as appropriate: allergies, current medications, past family history, past medical history, past social history, past surgical history and problem list.    Review of Systems   Respiratory: Negative for shortness of breath.    Cardiovascular: Negative for chest pain.   All other systems reviewed and are negative.      Objective   Physical Exam   Constitutional: She appears well-developed and well-nourished.   Nursing note and vitals reviewed.    Patient looks uncomfortable, is rocking back and forth as she sits in the room.  She does not appear encephalopathic and she does not have asterixis.  Her abdomen demonstrates some tenderness in the epigastrium but it I do not feel any mass or gross organomegaly.  Rectal examination is performed.  There is solid stool in the rectal vault.  It is 1+ Hemoccult positive.    Assessment/Plan   Problems Addressed this Visit        Digestive    Cirrhosis of liver - Primary    Relevant Orders    CBC & Differential    Comprehensive Metabolic Panel    Protime-INR    AFP Tumor Marker    CT Abdomen Pelvis With Contrast      Other Visit Diagnoses     Generalized abdominal pain        Relevant Orders    CBC & Differential    Comprehensive  Metabolic Panel    Protime-INR    AFP Tumor Marker    CT Abdomen Pelvis With Contrast        Hard to know what to make of all of this.  I will get the above blood work and a CT scan of the abdomen and pelvis.  She may require updating her endoscopic studies as well.  I'll make a decision regarding that when he get her blood work back.  I will see her back within the week.

## 2017-10-11 ENCOUNTER — TELEPHONE (OUTPATIENT)
Dept: GASTROENTEROLOGY | Facility: CLINIC | Age: 57
End: 2017-10-11

## 2017-10-11 LAB — AFP-TM SERPL-MCNC: 3 NG/ML (ref 0–8.3)

## 2017-10-11 NOTE — TELEPHONE ENCOUNTER
----- Message from Rich Coleman MD sent at 10/11/2017  2:53 PM EDT -----  Please call the patient regarding her abnormal result. No major abnormalities such as severe anemia or poor kidney function are present. Tumor marker is normal. This is good.

## 2017-10-11 NOTE — PROGRESS NOTES
Please call the patient regarding her abnormal result. No major abnormalities such as severe anemia or poor kidney function are present. Tumor marker is normal. This is good.

## 2017-10-12 ENCOUNTER — LAB (OUTPATIENT)
Dept: LAB | Facility: HOSPITAL | Age: 57
End: 2017-10-12

## 2017-10-12 ENCOUNTER — OFFICE VISIT (OUTPATIENT)
Dept: ONCOLOGY | Facility: CLINIC | Age: 57
End: 2017-10-12

## 2017-10-12 VITALS
OXYGEN SATURATION: 97 % | RESPIRATION RATE: 16 BRPM | HEIGHT: 66 IN | BODY MASS INDEX: 27.03 KG/M2 | HEART RATE: 110 BPM | TEMPERATURE: 98.2 F | WEIGHT: 168.2 LBS | DIASTOLIC BLOOD PRESSURE: 68 MMHG | SYSTOLIC BLOOD PRESSURE: 118 MMHG

## 2017-10-12 DIAGNOSIS — D61.818 PANCYTOPENIA (HCC): Primary | ICD-10-CM

## 2017-10-12 DIAGNOSIS — K74.60 CIRRHOSIS OF LIVER WITHOUT ASCITES, UNSPECIFIED HEPATIC CIRRHOSIS TYPE (HCC): ICD-10-CM

## 2017-10-12 DIAGNOSIS — K76.6 PORTAL HYPERTENSION (HCC): ICD-10-CM

## 2017-10-12 DIAGNOSIS — I85.11 SECONDARY ESOPHAGEAL VARICES WITH BLEEDING (HCC): ICD-10-CM

## 2017-10-12 DIAGNOSIS — D73.1 HYPERSPLENISM: ICD-10-CM

## 2017-10-12 DIAGNOSIS — D61.818 PANCYTOPENIA (HCC): ICD-10-CM

## 2017-10-12 LAB
ALBUMIN SERPL-MCNC: 3.7 G/DL (ref 3.5–5.2)
ALBUMIN/GLOB SERPL: 1.1 G/DL (ref 1.1–2.4)
ALP SERPL-CCNC: 108 U/L (ref 38–116)
ALT SERPL W P-5'-P-CCNC: 34 U/L (ref 0–33)
ANION GAP SERPL CALCULATED.3IONS-SCNC: 17.3 MMOL/L
AST SERPL-CCNC: 32 U/L (ref 0–32)
BASOPHILS # BLD AUTO: 0.02 10*3/MM3 (ref 0–0.1)
BASOPHILS NFR BLD AUTO: 0.6 % (ref 0–1.1)
BILIRUB SERPL-MCNC: 0.5 MG/DL (ref 0.1–1.2)
BUN BLD-MCNC: 7 MG/DL (ref 6–20)
BUN/CREAT SERPL: 10.1 (ref 7.3–30)
CALCIUM SPEC-SCNC: 9.2 MG/DL (ref 8.5–10.2)
CHLORIDE SERPL-SCNC: 101 MMOL/L (ref 98–107)
CO2 SERPL-SCNC: 21.7 MMOL/L (ref 22–29)
CREAT BLD-MCNC: 0.69 MG/DL (ref 0.6–1.1)
DEPRECATED RDW RBC AUTO: 47.6 FL (ref 37–49)
EOSINOPHIL # BLD AUTO: 0.08 10*3/MM3 (ref 0–0.36)
EOSINOPHIL NFR BLD AUTO: 2.5 % (ref 1–5)
ERYTHROCYTE [DISTWIDTH] IN BLOOD BY AUTOMATED COUNT: 14.7 % (ref 11.7–14.5)
FERRITIN SERPL-MCNC: 26.7 NG/ML (ref 11–207)
GFR SERPL CREATININE-BSD FRML MDRD: 88 ML/MIN/1.73
GLOBULIN UR ELPH-MCNC: 3.4 GM/DL (ref 1.8–3.5)
GLUCOSE BLD-MCNC: 185 MG/DL (ref 74–124)
HCT VFR BLD AUTO: 38.1 % (ref 34–45)
HGB BLD-MCNC: 12.5 G/DL (ref 11.5–14.9)
IMM GRANULOCYTES # BLD: 0.02 10*3/MM3 (ref 0–0.03)
IMM GRANULOCYTES NFR BLD: 0.6 % (ref 0–0.5)
IRON 24H UR-MRATE: 49 MCG/DL (ref 37–145)
IRON SATN MFR SERPL: 10 % (ref 14–48)
LYMPHOCYTES # BLD AUTO: 0.98 10*3/MM3 (ref 1–3.5)
LYMPHOCYTES NFR BLD AUTO: 30.3 % (ref 20–49)
MCH RBC QN AUTO: 29.2 PG (ref 27–33)
MCHC RBC AUTO-ENTMCNC: 32.8 G/DL (ref 32–35)
MCV RBC AUTO: 89 FL (ref 83–97)
MONOCYTES # BLD AUTO: 0.34 10*3/MM3 (ref 0.25–0.8)
MONOCYTES NFR BLD AUTO: 10.5 % (ref 4–12)
NEUTROPHILS # BLD AUTO: 1.79 10*3/MM3 (ref 1.5–7)
NEUTROPHILS NFR BLD AUTO: 55.5 % (ref 39–75)
NRBC BLD MANUAL-RTO: 0 /100 WBC (ref 0–0)
PLATELET # BLD AUTO: 91 10*3/MM3 (ref 150–375)
PMV BLD AUTO: 10.6 FL (ref 8.9–12.1)
POTASSIUM BLD-SCNC: 4.3 MMOL/L (ref 3.5–4.7)
PROT SERPL-MCNC: 7.1 G/DL (ref 6.3–8)
RBC # BLD AUTO: 4.28 10*6/MM3 (ref 3.9–5)
SODIUM BLD-SCNC: 140 MMOL/L (ref 134–145)
TIBC SERPL-MCNC: 470 MCG/DL (ref 249–505)
TRANSFERRIN SERPL-MCNC: 336 MG/DL (ref 200–360)
WBC NRBC COR # BLD: 3.23 10*3/MM3 (ref 4–10)

## 2017-10-12 PROCEDURE — 85025 COMPLETE CBC W/AUTO DIFF WBC: CPT | Performed by: INTERNAL MEDICINE

## 2017-10-12 PROCEDURE — 84466 ASSAY OF TRANSFERRIN: CPT | Performed by: INTERNAL MEDICINE

## 2017-10-12 PROCEDURE — 83540 ASSAY OF IRON: CPT | Performed by: INTERNAL MEDICINE

## 2017-10-12 PROCEDURE — 80053 COMPREHEN METABOLIC PANEL: CPT | Performed by: INTERNAL MEDICINE

## 2017-10-12 PROCEDURE — 99213 OFFICE O/P EST LOW 20 MIN: CPT | Performed by: INTERNAL MEDICINE

## 2017-10-12 PROCEDURE — 82728 ASSAY OF FERRITIN: CPT | Performed by: INTERNAL MEDICINE

## 2017-10-12 PROCEDURE — 36415 COLL VENOUS BLD VENIPUNCTURE: CPT | Performed by: INTERNAL MEDICINE

## 2017-10-12 NOTE — PROGRESS NOTES
Subjective   REASONS FOR FOLLOWUP:   1. 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   2. Evidence of B12 deficiency on her initial lab evaluation in April 2013. Patient was started on parenteral B12 replacement. The patient was switched from shots to daily oral B12 replacement after the visit of 12/02/2013.   3. Borderline iron stores. The patient was started on oral iron supplement once daily after the visit of 06/17/2013.   4. Morphologically normal bone marrow from 08/20/2013 with normal flow cytometry. 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 Significance of this is uncertain.   5. Mixed connective tissue disorder, currently on Enbrel therapy.   6. Insulin-requiring diabetes mellitus.   7. Bleeding ulcer and gastroesophageal varices, treated while visiting family in [place] in November 2015  8. Right breast biopsy in April 2016 with benign pathology (ductal hyperplasia).  9. Banding of esophageal varices by Dr. Rich Coleman during EGD February 2017 and again in April 2017      HISTORY OF PRESENT ILLNESS:     History of Present Illness Silvia is a 57 y.o. 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  She has had GI bleeding in the past due to portal hypertension.  She also has been B12 deficient and takes oral B12 supplementation daily.    She has undergone evaluation for possible liver transplant at [facility].  She currently is not on the transplant list but continues to follow-up periodically.  She remains on Enbrel for her rheumatologic condition.  Her blood counts are decent today with a hemoglobin of 12.4.  She continues on oral iron supplementation and oral B12.    She continues to follow-up with Dr. Rich Coleman.  He performed another EGD with banding of esophageal varices in April.    Past Medical History, Past Surgical  "History, Social History, Family History have been reviewed and are without significant changes except as mentioned.    Review of Systems   Constitutional: Positive for fatigue. Negative for activity change, appetite change, fever and unexpected weight change.   HENT: Negative for hearing loss, nosebleeds, trouble swallowing and voice change.    Eyes: Negative for visual disturbance.   Respiratory: Negative for cough, shortness of breath and wheezing.    Cardiovascular: Negative for chest pain and palpitations.   Gastrointestinal: Positive for constipation. Negative for abdominal pain, diarrhea, nausea and vomiting.   Genitourinary: Negative for difficulty urinating, frequency, hematuria and urgency.   Musculoskeletal: Positive for arthralgias. Negative for back pain and neck pain.   Skin: Negative for rash.   Neurological: Negative for dizziness, seizures, syncope and headaches.   Hematological: Negative for adenopathy. Does not bruise/bleed easily.   Psychiatric/Behavioral: Negative for behavioral problems. The patient is not nervous/anxious.       A comprehensive 14 point review of systems was performed and was negative except as mentioned.    Medications:  The current medication list was reviewed in the EMR    ALLERGIES:    Allergies   Allergen Reactions   • Albuterol Anaphylaxis   • Quinine Derivatives Nausea And Vomiting   • Tramadol Other (See Comments)     Per pt causes her \"palsy, meaning shaking and tremors\" and gi upset, nausea       Objective      There were no vitals filed for this visit.  Current Status 3/28/2017   ECOG score 0       Physical Exam   Constitutional: She is oriented to person, place, and time. She appears well-developed and well-nourished. No distress.   HENT:   Head: Normocephalic.   Eyes: Conjunctivae and EOM are normal. Pupils are equal, round, and reactive to light. No scleral icterus.   Neck: Normal range of motion. Neck supple. No JVD present. No thyromegaly present. "   Cardiovascular: Normal rate and regular rhythm.  Exam reveals no gallop and no friction rub.    No murmur heard.  Pulmonary/Chest: Effort normal and breath sounds normal. She has no wheezes. She has no rales.   Abdominal: Soft. She exhibits no distension and no mass. There is no tenderness.   Musculoskeletal: Normal range of motion. She exhibits no edema or deformity.   Lymphadenopathy:     She has no cervical adenopathy.   Neurological: She is alert and oriented to person, place, and time. She has normal reflexes. No cranial nerve deficit.   Skin: Skin is warm and dry. No rash noted. No erythema.   Spider angiomata   Psychiatric: She has a normal mood and affect. Her behavior is normal. Judgment normal.        RECENT LABS:  Hematology WBC   Date Value Ref Range Status   10/10/2017 3.62 (L) 4.50 - 10.70 10*3/mm3 Final     RBC   Date Value Ref Range Status   10/10/2017 4.26 3.90 - 5.20 10*6/mm3 Final     Hemoglobin   Date Value Ref Range Status   10/10/2017 12.4 11.9 - 15.5 g/dL Final     Hematocrit   Date Value Ref Range Status   10/10/2017 38.1 35.6 - 45.5 % Final     Platelets   Date Value Ref Range Status   10/10/2017 90 (L) 140 - 500 10*3/mm3 Final          Assessment/Plan     1. 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   2. Evidence of B12 deficiency on her initial lab evaluation in April 2013.  Patient was started on parenteral B12 replacement.  The patient was switched from shots to daily oral B12 replacement after the visit of 12/02/2013.    3. Hemoglobin has significantly improved since banding of her esophageal varices and increase in her oral iron supplement to twice daily dosing.  4. Morphologically normal bone marrow from 08/20/2013 with normal flow cytometry.  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  Significance of this is uncertain.    5. Mixed  connective tissue disorder, currently on Enbrel therapy.  6. Insulin-requiring diabetes mellitus.  7. Patient continues to follow-up periodically with the transplant team at [facility] but currently she is not on the transplant list.      Plan    1.  Continue oral B12 replacement.  2.  Continue twice daily oral iron supplementation.  3.   We will schedule routine follow-up in 6 months with repeat iron studies and B12 level at that time and a CBC.      The patient knows that she can call us sooner if she is having any new issues prior to that visit.              10/12/2017      CC:

## 2017-10-13 ENCOUNTER — TELEPHONE (OUTPATIENT)
Dept: GASTROENTEROLOGY | Facility: CLINIC | Age: 57
End: 2017-10-13

## 2017-10-13 ENCOUNTER — HOSPITAL ENCOUNTER (OUTPATIENT)
Dept: CT IMAGING | Facility: HOSPITAL | Age: 57
Discharge: HOME | End: 2017-10-13
Attending: INTERNAL MEDICINE | Admitting: INTERNAL MEDICINE

## 2017-10-13 DIAGNOSIS — R10.84 GENERALIZED ABDOMINAL PAIN: ICD-10-CM

## 2017-10-13 DIAGNOSIS — K74.60 CIRRHOSIS OF LIVER WITHOUT ASCITES, UNSPECIFIED HEPATIC CIRRHOSIS TYPE (HCC): ICD-10-CM

## 2017-10-13 LAB — CREAT BLDA-MCNC: 0.6 MG/DL (ref 0.6–1.3)

## 2017-10-13 PROCEDURE — 82565 ASSAY OF CREATININE: CPT

## 2017-10-13 PROCEDURE — 0 IOPAMIDOL 61 % SOLUTION: Performed by: INTERNAL MEDICINE

## 2017-10-13 PROCEDURE — 74177 CT ABD & PELVIS W/CONTRAST: CPT

## 2017-10-13 RX ADMIN — IOPAMIDOL 85 ML: 612 INJECTION, SOLUTION INTRAVENOUS at 15:45

## 2017-10-17 ENCOUNTER — OFFICE VISIT (OUTPATIENT)
Dept: GASTROENTEROLOGY | Facility: CLINIC | Age: 57
End: 2017-10-17

## 2017-10-17 VITALS
HEIGHT: 66 IN | HEART RATE: 116 BPM | DIASTOLIC BLOOD PRESSURE: 83 MMHG | WEIGHT: 166 LBS | SYSTOLIC BLOOD PRESSURE: 131 MMHG | BODY MASS INDEX: 26.68 KG/M2

## 2017-10-17 DIAGNOSIS — R11.0 NAUSEA: ICD-10-CM

## 2017-10-17 DIAGNOSIS — K31.84 GASTROPARESIS: ICD-10-CM

## 2017-10-17 DIAGNOSIS — K75.81 NASH (NONALCOHOLIC STEATOHEPATITIS): ICD-10-CM

## 2017-10-17 DIAGNOSIS — R18.8 CIRRHOSIS OF LIVER WITH ASCITES, UNSPECIFIED HEPATIC CIRRHOSIS TYPE (HCC): Primary | ICD-10-CM

## 2017-10-17 DIAGNOSIS — R18.8 OTHER ASCITES: ICD-10-CM

## 2017-10-17 DIAGNOSIS — K74.60 CIRRHOSIS OF LIVER WITH ASCITES, UNSPECIFIED HEPATIC CIRRHOSIS TYPE (HCC): Primary | ICD-10-CM

## 2017-10-17 PROCEDURE — 99213 OFFICE O/P EST LOW 20 MIN: CPT | Performed by: INTERNAL MEDICINE

## 2017-10-17 RX ORDER — RIFAXIMIN 550 MG/1
TABLET ORAL
Qty: 60 TABLET | Refills: 5 | Status: SHIPPED | OUTPATIENT
Start: 2017-10-17 | End: 2017-11-22

## 2017-10-17 RX ORDER — ERYTHROMYCIN 250 MG/1
250 TABLET, COATED ORAL 3 TIMES DAILY
Qty: 90 TABLET | Refills: 1 | Status: SHIPPED | OUTPATIENT
Start: 2017-10-17 | End: 2017-11-08 | Stop reason: SINTOL

## 2017-10-17 NOTE — PROGRESS NOTES
Subjective   Silvia Zabala is a 57 y.o. female is here today for follow-up.  Chief Complaint   Patient presents with   • Abdominal Pain   • Cirrhosis     History of Present Illness  She is doing much better since she's been on Reglan.  Reglan is always help with her nausea and epigastric distress well and she was pretty sure it was going to work this time around.  Her CT scan and blood work showed nothing new except for very mild ascites.  The following portions of the patient's history were reviewed and updated as appropriate: allergies, current medications, past family history, past medical history, past social history, past surgical history and problem list.    Review of Systems   Respiratory: Negative for shortness of breath.    Cardiovascular: Negative for chest pain.   All other systems reviewed and are negative.      Objective   Physical Exam   Constitutional: She appears well-developed and well-nourished.   Nursing note and vitals reviewed.    No tardive dyskinesia noted.    Assessment/Plan   Problems Addressed this Visit        Digestive    Cirrhosis of liver - Primary    Nausea    DUKES (nonalcoholic steatohepatitis)    Gastroparesis       Other    Ascites        We will substitute erythromycin 250 mg for her Reglan and see if that might be as effective.  Otherwise we'll continue as is and I'll see her back in about 6 weeks.

## 2017-10-20 ENCOUNTER — OFFICE VISIT (OUTPATIENT)
Dept: INTERNAL MEDICINE | Facility: CLINIC | Age: 57
End: 2017-10-20

## 2017-10-20 VITALS
HEIGHT: 66 IN | WEIGHT: 169 LBS | BODY MASS INDEX: 27.16 KG/M2 | DIASTOLIC BLOOD PRESSURE: 70 MMHG | SYSTOLIC BLOOD PRESSURE: 114 MMHG | TEMPERATURE: 98.7 F

## 2017-10-20 DIAGNOSIS — F32.A ANXIETY AND DEPRESSION: ICD-10-CM

## 2017-10-20 DIAGNOSIS — E78.5 HYPERLIPIDEMIA, UNSPECIFIED HYPERLIPIDEMIA TYPE: Primary | ICD-10-CM

## 2017-10-20 DIAGNOSIS — F41.9 ANXIETY AND DEPRESSION: ICD-10-CM

## 2017-10-20 LAB
CHOLEST SERPL-MCNC: 149 MG/DL (ref 0–200)
HDLC SERPL-MCNC: 59 MG/DL (ref 40–60)
LDLC SERPL CALC-MCNC: 70 MG/DL (ref 0–100)
TRIGL SERPL-MCNC: 100 MG/DL (ref 0–150)
VLDLC SERPL-MCNC: 20 MG/DL (ref 5–40)

## 2017-10-20 PROCEDURE — 99213 OFFICE O/P EST LOW 20 MIN: CPT | Performed by: INTERNAL MEDICINE

## 2017-10-20 RX ORDER — BUPROPION HYDROCHLORIDE 150 MG/1
150 TABLET ORAL DAILY
Qty: 90 TABLET | Refills: 1 | Status: SHIPPED | OUTPATIENT
Start: 2017-10-20 | End: 2017-12-20 | Stop reason: HOSPADM

## 2017-10-20 NOTE — PROGRESS NOTES
"Chief Complaint   Patient presents with   • Hyperlipidemia     4 month follow up   • Dizziness     dizziness and nausea for 2 weeks        Subjective   Silvia Zabala is a 57 y.o. female     Hyperlipidemia   This is a chronic problem. Recent lipid tests were reviewed and are variable. Exacerbating diseases include diabetes, liver disease and obesity. Factors aggravating her hyperlipidemia include fatty foods. Pertinent negatives include no chest pain, focal sensory loss, focal weakness, myalgias or shortness of breath. Current antihyperlipidemic treatment includes statins. The current treatment provides moderate improvement of lipids. Compliance problems include adherence to diet.  Risk factors for coronary artery disease include diabetes mellitus, dyslipidemia, post-menopausal, a sedentary lifestyle, stress and obesity.        She has discussed nausea with her gastroenterologist.  Reglan has been stopped and erythromycin tried for nausea, gastroparesis.      Depression - cymbalta is not helping.       The following portions of the patient's history were reviewed and updated as appropriate: allergies, current medications, past social history, problem list, past surgical history    Review of Systems   Constitutional: Negative for activity change and appetite change.   HENT: Negative for nosebleeds.    Eyes: Negative for visual disturbance.   Respiratory: Negative for cough and shortness of breath.    Cardiovascular: Negative for chest pain, palpitations and leg swelling.   Musculoskeletal: Negative for myalgias.   Neurological: Positive for dizziness. Negative for focal weakness, syncope and headaches.   Psychiatric/Behavioral: Negative for sleep disturbance.           Objective     /70  Temp 98.7 °F (37.1 °C)  Ht 66\" (167.6 cm)  Wt 169 lb (76.7 kg)  BMI 27.28 kg/m2     Physical Exam   Constitutional: She is oriented to person, place, and time. She appears well-developed and well-nourished. No distress. "   Neck: Normal carotid pulses present. Carotid bruit is not present.   Cardiovascular: Normal rate, regular rhythm, S1 normal, S2 normal and intact distal pulses.  Exam reveals no gallop and no friction rub.    No murmur heard.  Pulses:       Carotid pulses are 2+ on the right side, and 2+ on the left side.  Pulmonary/Chest: Effort normal and breath sounds normal. No respiratory distress. She has no wheezes. She has no rhonchi. She has no rales. Chest wall is not dull to percussion.   Musculoskeletal: She exhibits no edema.   Neurological: She is alert and oriented to person, place, and time.   Skin: Skin is warm and dry.   Psychiatric: She exhibits a depressed mood.   Nursing note and vitals reviewed.      Assessment/Plan   Problem List Items Addressed This Visit        Cardiovascular and Mediastinum    Hyperlipidemia - Primary    Relevant Orders    Lipid Panel       Other    Anxiety and depression    Relevant Medications    buPROPion XL (WELLBUTRIN XL) 150 MG 24 hr tablet        She will monitor dizziness.  Add bupropion.  She will continue cymbalta. Advised her to resume counseling.

## 2017-10-30 ENCOUNTER — HOSPITAL ENCOUNTER (EMERGENCY)
Facility: HOSPITAL | Age: 57
Discharge: HOME | End: 2017-10-30
Attending: EMERGENCY MEDICINE | Admitting: EMERGENCY MEDICINE

## 2017-10-30 ENCOUNTER — TELEPHONE (OUTPATIENT)
Dept: GASTROENTEROLOGY | Facility: CLINIC | Age: 57
End: 2017-10-30

## 2017-10-30 ENCOUNTER — APPOINTMENT (OUTPATIENT)
Dept: CT IMAGING | Facility: HOSPITAL | Age: 57
End: 2017-10-30

## 2017-10-30 VITALS
TEMPERATURE: 97.7 F | BODY MASS INDEX: 26.84 KG/M2 | RESPIRATION RATE: 18 BRPM | HEART RATE: 110 BPM | HEIGHT: 66 IN | DIASTOLIC BLOOD PRESSURE: 74 MMHG | SYSTOLIC BLOOD PRESSURE: 128 MMHG | WEIGHT: 167 LBS | OXYGEN SATURATION: 96 %

## 2017-10-30 DIAGNOSIS — R10.84 GENERALIZED ABDOMINAL PAIN: Primary | ICD-10-CM

## 2017-10-30 DIAGNOSIS — R18.8 OTHER ASCITES: Primary | ICD-10-CM

## 2017-10-30 DIAGNOSIS — K31.84 GASTROPARESIS: ICD-10-CM

## 2017-10-30 DIAGNOSIS — R73.9 HYPERGLYCEMIA: ICD-10-CM

## 2017-10-30 LAB
ALBUMIN SERPL-MCNC: 4 G/DL (ref 3.5–5.2)
ALBUMIN/GLOB SERPL: 1.1 G/DL
ALP SERPL-CCNC: 103 U/L (ref 39–117)
ALT SERPL W P-5'-P-CCNC: 40 U/L (ref 1–33)
ANION GAP SERPL CALCULATED.3IONS-SCNC: 17.3 MMOL/L
AST SERPL-CCNC: 36 U/L (ref 1–32)
BASOPHILS # BLD AUTO: 0.01 10*3/MM3 (ref 0–0.2)
BASOPHILS NFR BLD AUTO: 0.2 % (ref 0–1.5)
BILIRUB SERPL-MCNC: 0.8 MG/DL (ref 0.1–1.2)
BILIRUB UR QL STRIP: NEGATIVE
BUN BLD-MCNC: 9 MG/DL (ref 6–20)
BUN/CREAT SERPL: 11.5 (ref 7–25)
CALCIUM SPEC-SCNC: 10 MG/DL (ref 8.6–10.5)
CHLORIDE SERPL-SCNC: 94 MMOL/L (ref 98–107)
CLARITY UR: CLEAR
CO2 SERPL-SCNC: 21.7 MMOL/L (ref 22–29)
COLOR UR: YELLOW
CREAT BLD-MCNC: 0.78 MG/DL (ref 0.57–1)
DEPRECATED RDW RBC AUTO: 46.1 FL (ref 37–54)
EOSINOPHIL # BLD AUTO: 0.06 10*3/MM3 (ref 0–0.7)
EOSINOPHIL NFR BLD AUTO: 1.1 % (ref 0.3–6.2)
ERYTHROCYTE [DISTWIDTH] IN BLOOD BY AUTOMATED COUNT: 14.8 % (ref 11.7–13)
GFR SERPL CREATININE-BSD FRML MDRD: 77 ML/MIN/1.73
GLOBULIN UR ELPH-MCNC: 3.8 GM/DL
GLUCOSE BLD-MCNC: 442 MG/DL (ref 65–99)
GLUCOSE UR STRIP-MCNC: ABNORMAL MG/DL
HCT VFR BLD AUTO: 38 % (ref 35.6–45.5)
HGB BLD-MCNC: 13 G/DL (ref 11.9–15.5)
HGB UR QL STRIP.AUTO: NEGATIVE
HOLD SPECIMEN: NORMAL
HOLD SPECIMEN: NORMAL
IMM GRANULOCYTES # BLD: 0.04 10*3/MM3 (ref 0–0.03)
IMM GRANULOCYTES NFR BLD: 0.7 % (ref 0–0.5)
KETONES UR QL STRIP: NEGATIVE
LEUKOCYTE ESTERASE UR QL STRIP.AUTO: NEGATIVE
LIPASE SERPL-CCNC: 10 U/L (ref 13–60)
LYMPHOCYTES # BLD AUTO: 0.98 10*3/MM3 (ref 0.9–4.8)
LYMPHOCYTES NFR BLD AUTO: 17.3 % (ref 19.6–45.3)
MCH RBC QN AUTO: 29.4 PG (ref 26.9–32)
MCHC RBC AUTO-ENTMCNC: 34.2 G/DL (ref 32.4–36.3)
MCV RBC AUTO: 86 FL (ref 80.5–98.2)
MONOCYTES # BLD AUTO: 0.38 10*3/MM3 (ref 0.2–1.2)
MONOCYTES NFR BLD AUTO: 6.7 % (ref 5–12)
NEUTROPHILS # BLD AUTO: 4.19 10*3/MM3 (ref 1.9–8.1)
NEUTROPHILS NFR BLD AUTO: 74 % (ref 42.7–76)
NITRITE UR QL STRIP: NEGATIVE
PH UR STRIP.AUTO: 6 [PH] (ref 5–8)
PLATELET # BLD AUTO: 95 10*3/MM3 (ref 140–500)
PMV BLD AUTO: 11.5 FL (ref 6–12)
POTASSIUM BLD-SCNC: 5 MMOL/L (ref 3.5–5.2)
PROT SERPL-MCNC: 7.8 G/DL (ref 6–8.5)
PROT UR QL STRIP: NEGATIVE
RBC # BLD AUTO: 4.42 10*6/MM3 (ref 3.9–5.2)
SODIUM BLD-SCNC: 133 MMOL/L (ref 136–145)
SP GR UR STRIP: 1.02 (ref 1–1.03)
UROBILINOGEN UR QL STRIP: ABNORMAL
WBC NRBC COR # BLD: 5.66 10*3/MM3 (ref 4.5–10.7)
WHOLE BLOOD HOLD SPECIMEN: NORMAL
WHOLE BLOOD HOLD SPECIMEN: NORMAL

## 2017-10-30 PROCEDURE — 0 IOPAMIDOL 61 % SOLUTION: Performed by: EMERGENCY MEDICINE

## 2017-10-30 PROCEDURE — 96374 THER/PROPH/DIAG INJ IV PUSH: CPT

## 2017-10-30 PROCEDURE — 25010000002 PROMETHAZINE PER 50 MG: Performed by: EMERGENCY MEDICINE

## 2017-10-30 PROCEDURE — 74177 CT ABD & PELVIS W/CONTRAST: CPT

## 2017-10-30 PROCEDURE — 96361 HYDRATE IV INFUSION ADD-ON: CPT

## 2017-10-30 PROCEDURE — 96376 TX/PRO/DX INJ SAME DRUG ADON: CPT

## 2017-10-30 PROCEDURE — 99284 EMERGENCY DEPT VISIT MOD MDM: CPT

## 2017-10-30 PROCEDURE — 81003 URINALYSIS AUTO W/O SCOPE: CPT | Performed by: EMERGENCY MEDICINE

## 2017-10-30 PROCEDURE — 25010000002 HYDROMORPHONE PER 4 MG: Performed by: EMERGENCY MEDICINE

## 2017-10-30 PROCEDURE — 85025 COMPLETE CBC W/AUTO DIFF WBC: CPT | Performed by: EMERGENCY MEDICINE

## 2017-10-30 PROCEDURE — 96375 TX/PRO/DX INJ NEW DRUG ADDON: CPT

## 2017-10-30 PROCEDURE — 36415 COLL VENOUS BLD VENIPUNCTURE: CPT | Performed by: EMERGENCY MEDICINE

## 2017-10-30 PROCEDURE — 80053 COMPREHEN METABOLIC PANEL: CPT | Performed by: EMERGENCY MEDICINE

## 2017-10-30 PROCEDURE — 83690 ASSAY OF LIPASE: CPT | Performed by: EMERGENCY MEDICINE

## 2017-10-30 RX ORDER — METOCLOPRAMIDE 5 MG/1
5 TABLET ORAL 3 TIMES DAILY PRN
Refills: 0
Start: 2017-10-30 | End: 2017-11-22

## 2017-10-30 RX ORDER — PROMETHAZINE HYDROCHLORIDE 25 MG/ML
12.5 INJECTION, SOLUTION INTRAMUSCULAR; INTRAVENOUS ONCE
Status: COMPLETED | OUTPATIENT
Start: 2017-10-30 | End: 2017-10-30

## 2017-10-30 RX ORDER — SODIUM CHLORIDE 0.9 % (FLUSH) 0.9 %
10 SYRINGE (ML) INJECTION AS NEEDED
Status: DISCONTINUED | OUTPATIENT
Start: 2017-10-30 | End: 2017-10-31 | Stop reason: HOSPADM

## 2017-10-30 RX ADMIN — HYDROMORPHONE HYDROCHLORIDE 1 MG: 1 INJECTION, SOLUTION INTRAMUSCULAR; INTRAVENOUS; SUBCUTANEOUS at 19:47

## 2017-10-30 RX ADMIN — PROMETHAZINE HYDROCHLORIDE 12.5 MG: 25 INJECTION INTRAMUSCULAR; INTRAVENOUS at 19:38

## 2017-10-30 RX ADMIN — SODIUM CHLORIDE 1000 ML: 9 INJECTION, SOLUTION INTRAVENOUS at 19:46

## 2017-10-30 RX ADMIN — IOPAMIDOL 85 ML: 612 INJECTION, SOLUTION INTRAVENOUS at 19:13

## 2017-10-30 RX ADMIN — PROMETHAZINE HYDROCHLORIDE 12.5 MG: 25 INJECTION INTRAMUSCULAR; INTRAVENOUS at 22:18

## 2017-10-30 NOTE — TELEPHONE ENCOUNTER
"Pt is off Reglan and taking Erythromycin that was recently prescribed.  Pt states she had bad weekend with abd pain, constipation, then diarrhea. Pain is somewhat relieved when having BM.  Eating causes \"excruciating\" pain.  She can't have BM today, only had small amount of mucus on toilet paper.  She already takes 3 stool softeners per day along with Movantik.    Discussed with Dr Coleman.  He recommends pt stop Erythromycin and do not restart Reglan.  He also advises pt to start Miralax 2 scoops per day, and has ordered abdominal US.    Pt notified of above recommendations.  She states the pain is so severe now that she is having her  take her to the hospital.  Dr Coleman notified.  "

## 2017-11-01 RX ORDER — DULOXETIN HYDROCHLORIDE 30 MG/1
CAPSULE, DELAYED RELEASE ORAL
Qty: 90 CAPSULE | Refills: 4 | Status: SHIPPED | OUTPATIENT
Start: 2017-11-01 | End: 2017-11-22

## 2017-11-02 ENCOUNTER — TELEPHONE (OUTPATIENT)
Dept: SOCIAL WORK | Facility: HOSPITAL | Age: 57
End: 2017-11-02

## 2017-11-02 NOTE — TELEPHONE ENCOUNTER
ED f/u phone call: states she is taking prescribed med, feeling better and has f/u henok't with gastroenterologist next week. No questions/concerns

## 2017-11-08 ENCOUNTER — APPOINTMENT (OUTPATIENT)
Dept: LAB | Facility: HOSPITAL | Age: 57
End: 2017-11-08

## 2017-11-08 ENCOUNTER — TELEPHONE (OUTPATIENT)
Dept: GASTROENTEROLOGY | Facility: CLINIC | Age: 57
End: 2017-11-08

## 2017-11-08 ENCOUNTER — OFFICE VISIT (OUTPATIENT)
Dept: GASTROENTEROLOGY | Facility: CLINIC | Age: 57
End: 2017-11-08

## 2017-11-08 VITALS
SYSTOLIC BLOOD PRESSURE: 131 MMHG | HEIGHT: 66 IN | WEIGHT: 168 LBS | BODY MASS INDEX: 27 KG/M2 | DIASTOLIC BLOOD PRESSURE: 80 MMHG | HEART RATE: 118 BPM

## 2017-11-08 DIAGNOSIS — R11.0 NAUSEA: ICD-10-CM

## 2017-11-08 DIAGNOSIS — K74.69 OTHER CIRRHOSIS OF LIVER (HCC): Primary | ICD-10-CM

## 2017-11-08 DIAGNOSIS — IMO0002 TYPE 2 DIABETES MELLITUS, UNCONTROLLED, WITH NEUROPATHY: ICD-10-CM

## 2017-11-08 LAB
ALBUMIN SERPL-MCNC: 3.9 G/DL (ref 3.5–5.2)
ALBUMIN/GLOB SERPL: 1.1 G/DL
ALP SERPL-CCNC: 104 U/L (ref 39–117)
ALT SERPL W P-5'-P-CCNC: 31 U/L (ref 1–33)
AMYLASE SERPL-CCNC: 17 U/L (ref 28–100)
ANION GAP SERPL CALCULATED.3IONS-SCNC: 15.4 MMOL/L
AST SERPL-CCNC: 27 U/L (ref 1–32)
BASOPHILS # BLD AUTO: 0.03 10*3/MM3 (ref 0–0.2)
BASOPHILS NFR BLD AUTO: 0.6 % (ref 0–1.5)
BILIRUB SERPL-MCNC: 0.6 MG/DL (ref 0.1–1.2)
BUN BLD-MCNC: 13 MG/DL (ref 6–20)
BUN/CREAT SERPL: 15.3 (ref 7–25)
CALCIUM SPEC-SCNC: 9.6 MG/DL (ref 8.6–10.5)
CHLORIDE SERPL-SCNC: 96 MMOL/L (ref 98–107)
CO2 SERPL-SCNC: 23.6 MMOL/L (ref 22–29)
CREAT BLD-MCNC: 0.85 MG/DL (ref 0.57–1)
DEPRECATED RDW RBC AUTO: 47.1 FL (ref 37–54)
EOSINOPHIL # BLD AUTO: 0.1 10*3/MM3 (ref 0–0.7)
EOSINOPHIL NFR BLD AUTO: 2 % (ref 0.3–6.2)
ERYTHROCYTE [DISTWIDTH] IN BLOOD BY AUTOMATED COUNT: 14.5 % (ref 11.7–13)
GFR SERPL CREATININE-BSD FRML MDRD: 69 ML/MIN/1.73
GLOBULIN UR ELPH-MCNC: 3.6 GM/DL
GLUCOSE BLD-MCNC: 407 MG/DL (ref 65–99)
HCT VFR BLD AUTO: 40.1 % (ref 35.6–45.5)
HGB BLD-MCNC: 12.8 G/DL (ref 11.9–15.5)
IMM GRANULOCYTES # BLD: 0.04 10*3/MM3 (ref 0–0.03)
IMM GRANULOCYTES NFR BLD: 0.8 % (ref 0–0.5)
LYMPHOCYTES # BLD AUTO: 1.07 10*3/MM3 (ref 0.9–4.8)
LYMPHOCYTES NFR BLD AUTO: 21.4 % (ref 19.6–45.3)
MCH RBC QN AUTO: 28.6 PG (ref 26.9–32)
MCHC RBC AUTO-ENTMCNC: 31.9 G/DL (ref 32.4–36.3)
MCV RBC AUTO: 89.7 FL (ref 80.5–98.2)
MONOCYTES # BLD AUTO: 0.48 10*3/MM3 (ref 0.2–1.2)
MONOCYTES NFR BLD AUTO: 9.6 % (ref 5–12)
NEUTROPHILS # BLD AUTO: 3.29 10*3/MM3 (ref 1.9–8.1)
NEUTROPHILS NFR BLD AUTO: 65.6 % (ref 42.7–76)
PLATELET # BLD AUTO: 102 10*3/MM3 (ref 140–500)
PMV BLD AUTO: 11.2 FL (ref 6–12)
POTASSIUM BLD-SCNC: 5.1 MMOL/L (ref 3.5–5.2)
PROT SERPL-MCNC: 7.5 G/DL (ref 6–8.5)
RBC # BLD AUTO: 4.47 10*6/MM3 (ref 3.9–5.2)
SODIUM BLD-SCNC: 135 MMOL/L (ref 136–145)
WBC NRBC COR # BLD: 5.01 10*3/MM3 (ref 4.5–10.7)

## 2017-11-08 PROCEDURE — 36415 COLL VENOUS BLD VENIPUNCTURE: CPT | Performed by: INTERNAL MEDICINE

## 2017-11-08 PROCEDURE — 99214 OFFICE O/P EST MOD 30 MIN: CPT | Performed by: INTERNAL MEDICINE

## 2017-11-08 PROCEDURE — 85025 COMPLETE CBC W/AUTO DIFF WBC: CPT | Performed by: INTERNAL MEDICINE

## 2017-11-08 PROCEDURE — 80053 COMPREHEN METABOLIC PANEL: CPT | Performed by: INTERNAL MEDICINE

## 2017-11-08 PROCEDURE — 82150 ASSAY OF AMYLASE: CPT | Performed by: INTERNAL MEDICINE

## 2017-11-08 NOTE — TELEPHONE ENCOUNTER
Dr Coleman reviewed all labs done today (CMP,CBC,Amylase).  All stable except Glucose 407.  Dr Coleman states pt does not want to be admitted for IVFs, so he recommends pt contact Endocrinologist ( Dr Burnett) today for further treatment regarding elevated glucose. Pt verbalized understanding.

## 2017-11-08 NOTE — PROGRESS NOTES
Subjective   Silvia Zabala is a 57 y.o. female is here today for follow-up.  Chief Complaint   Patient presents with   • Cirrhosis   • Nausea   • Vomiting   • Abdominal Pain     History of Present Illness  Feels quite poorly.  She was in the emergency department last week with abdominal pain nausea and vomiting.  CT scan at that time demonstrated stricture cirrhosis and splenomegaly but no evidence of ascites, splenic infarct, and so forth.  Blood work demonstrated blood sugar in excess of 400.  She had been put on erythromycin prior to that hoping to reduce her exposure to metoclopramide, but this did not achieve desired affects and may have actually exacerbated her abdominal symptoms.  Diet is poor.  Abdominal pain is primarily left-sided.  No fevers.  No melena or hematochezia.  The following portions of the patient's history were reviewed and updated as appropriate: allergies, current medications, past family history, past medical history, past social history, past surgical history and problem list.    Review of Systems   Respiratory: Negative for shortness of breath.    Cardiovascular: Negative for chest pain.   All other systems reviewed and are negative.      Objective   Physical Exam  She is lying on the examination table with emesis bag in hand.    Vital signs are reviewed along with nursing notes.  Chest is clear.  Cardiac examination unremarkable.  Abdominal examination demonstrates no fluid wave.  She has splenomegaly.  She is tender on the left side.  She does not have rebound or guarding.  No asterixis were obvious evidence of encephalopathy.    Assessment/Plan   Problems Addressed this Visit        Digestive    Cirrhosis of liver - Primary    Relevant Orders    CBC & Differential    Comprehensive Metabolic Panel    Amylase    Nausea    Relevant Orders    CBC & Differential    Comprehensive Metabolic Panel    Amylase       Endocrine    Type 2 diabetes mellitus, uncontrolled, with neuropathy     Relevant Orders    CBC & Differential    Comprehensive Metabolic Panel    Amylase        She is not doing well.  I wonder whether her glucose may be grossly elevated and many of her issues related to that.  She was given the option of 23 hour observation along with fluids.  She would like to try to avoid the hospital if possible.  I will get the above blood work on a stat basis.  She is to increase her metoclopramide to 10 mg 4 times daily.  If her blood work doesn't show anything alarming I'll see her back in about a week.  She is to force fluids in the meantime.

## 2017-11-09 DIAGNOSIS — R25.2 MUSCLE CRAMPS: ICD-10-CM

## 2017-11-09 RX ORDER — CYCLOBENZAPRINE HCL 5 MG
TABLET ORAL
Qty: 30 TABLET | Refills: 4 | Status: SHIPPED | OUTPATIENT
Start: 2017-11-09 | End: 2017-11-22

## 2017-11-10 DIAGNOSIS — F41.9 ANXIETY: ICD-10-CM

## 2017-11-10 RX ORDER — ALPRAZOLAM 0.5 MG/1
0.5 TABLET ORAL 2 TIMES DAILY PRN
Qty: 60 TABLET | Refills: 0 | OUTPATIENT
Start: 2017-11-10 | End: 2017-11-29 | Stop reason: SDUPTHER

## 2017-11-10 NOTE — TELEPHONE ENCOUNTER
received fax refill request on xanax from [facility]  Last RF 8-1-17  Last OV 10-20-17  Next OV 2-22-17  Reed werner

## 2017-11-16 ENCOUNTER — OFFICE VISIT (OUTPATIENT)
Dept: GASTROENTEROLOGY | Facility: CLINIC | Age: 57
End: 2017-11-16

## 2017-11-16 VITALS
BODY MASS INDEX: 27 KG/M2 | HEIGHT: 66 IN | DIASTOLIC BLOOD PRESSURE: 74 MMHG | WEIGHT: 168 LBS | TEMPERATURE: 98.6 F | SYSTOLIC BLOOD PRESSURE: 111 MMHG | HEART RATE: 101 BPM

## 2017-11-16 DIAGNOSIS — R11.0 NAUSEA: ICD-10-CM

## 2017-11-16 DIAGNOSIS — K31.84 GASTROPARESIS: ICD-10-CM

## 2017-11-16 DIAGNOSIS — K74.60 CIRRHOSIS OF LIVER WITHOUT ASCITES, UNSPECIFIED HEPATIC CIRRHOSIS TYPE (HCC): Primary | ICD-10-CM

## 2017-11-16 PROCEDURE — 99213 OFFICE O/P EST LOW 20 MIN: CPT | Performed by: INTERNAL MEDICINE

## 2017-11-16 RX ORDER — SODIUM CHLORIDE, SODIUM LACTATE, POTASSIUM CHLORIDE, CALCIUM CHLORIDE 600; 310; 30; 20 MG/100ML; MG/100ML; MG/100ML; MG/100ML
30 INJECTION, SOLUTION INTRAVENOUS CONTINUOUS
Status: CANCELLED | OUTPATIENT
Start: 2017-11-24

## 2017-11-16 NOTE — PROGRESS NOTES
"Subjective   Silvia Zabala is a 57 y.o. female is here today for follow-up.  Chief Complaint   Patient presents with   • Cirrhosis   • Abdominal Pain   • Nausea     History of Present Illness  Continues to have abdominal pain, centers primarily in the epigastrium although on occasion she's had left lower quadrant pain with some radiation to the back.  Recent CT scan did not show nephrolithiasis.  She thinks she is developing a \"tic\" involving her mouth and I believe that is due to Reglan.  She is on opioids which help with peripheral pain but she believes that if anything it tends to make her stomach symptoms even worse.  Her last EGD was back in April and that was primarily for variceal surveillance.  She's not had any problems with melena or hematochezia, fortunately.  The following portions of the patient's history were reviewed and updated as appropriate: allergies, current medications, past family history, past medical history, past social history, past surgical history and problem list.    Review of Systems   Respiratory: Negative for shortness of breath.    Cardiovascular: Negative for chest pain.   All other systems reviewed and are negative.      Objective   Physical Exam   Constitutional: She appears well-developed and well-nourished.   Nursing note and vitals reviewed.    Her abdominal examination is unchanged.  It is \"touchy\" but I don't feel any mass and there is no guarding or rebound.    Assessment/Plan   Problems Addressed this Visit        Digestive    Cirrhosis of liver - Primary    Relevant Orders    Case Request (Completed)    Nausea    Relevant Orders    Case Request (Completed)    Gastroparesis    Relevant Orders    Case Request (Completed)        She definitely needs to reduce the Reglan as far she can, preferably 5 mg twice daily or even stop it.  I think her stomach would feel better if she could get off opioids but I think that is not anything that we can expect to happen anytime soon.  I " would like to update her EGD and see if there might be an issue that we can correct with that.

## 2017-11-17 PROBLEM — K74.60 CIRRHOSIS OF LIVER WITHOUT ASCITES (HCC): Status: ACTIVE | Noted: 2017-11-17

## 2017-11-22 RX ORDER — METOCLOPRAMIDE 10 MG/1
10 TABLET ORAL
COMMUNITY
End: 2017-12-20 | Stop reason: HOSPADM

## 2017-11-22 RX ORDER — PHENOL 1.4 %
1 AEROSOL, SPRAY (ML) MUCOUS MEMBRANE NIGHTLY
COMMUNITY
End: 2017-12-20 | Stop reason: HOSPADM

## 2017-11-22 RX ORDER — DULOXETIN HYDROCHLORIDE 30 MG/1
90 CAPSULE, DELAYED RELEASE ORAL DAILY
COMMUNITY
End: 2018-04-04 | Stop reason: SDUPTHER

## 2017-11-22 RX ORDER — CYCLOBENZAPRINE HCL 5 MG
5 TABLET ORAL 3 TIMES DAILY PRN
COMMUNITY
End: 2017-12-20 | Stop reason: HOSPADM

## 2017-11-22 RX ORDER — LEVETIRACETAM 500 MG/1
500 TABLET ORAL 2 TIMES DAILY
COMMUNITY
End: 2018-02-05 | Stop reason: SDUPTHER

## 2017-11-22 RX ORDER — FERROUS SULFATE 325(65) MG
325 TABLET ORAL 2 TIMES DAILY
COMMUNITY
End: 2017-12-20 | Stop reason: HOSPADM

## 2017-11-22 RX ORDER — FOLIC ACID 1 MG/1
1 TABLET ORAL DAILY
COMMUNITY
End: 2018-06-10 | Stop reason: SDUPTHER

## 2017-11-24 ENCOUNTER — HOSPITAL ENCOUNTER (OUTPATIENT)
Facility: HOSPITAL | Age: 57
Setting detail: HOSPITAL OUTPATIENT SURGERY
Discharge: HOME | End: 2017-11-24
Attending: INTERNAL MEDICINE | Admitting: INTERNAL MEDICINE

## 2017-11-24 ENCOUNTER — ANESTHESIA (OUTPATIENT)
Dept: GASTROENTEROLOGY | Facility: HOSPITAL | Age: 57
End: 2017-11-24

## 2017-11-24 ENCOUNTER — ANESTHESIA EVENT (OUTPATIENT)
Dept: GASTROENTEROLOGY | Facility: HOSPITAL | Age: 57
End: 2017-11-24

## 2017-11-24 VITALS
TEMPERATURE: 98.7 F | HEART RATE: 103 BPM | OXYGEN SATURATION: 97 % | DIASTOLIC BLOOD PRESSURE: 77 MMHG | WEIGHT: 168 LBS | SYSTOLIC BLOOD PRESSURE: 116 MMHG | RESPIRATION RATE: 16 BRPM | HEIGHT: 66 IN | BODY MASS INDEX: 27 KG/M2

## 2017-11-24 DIAGNOSIS — K74.60 CIRRHOSIS OF LIVER WITHOUT ASCITES, UNSPECIFIED HEPATIC CIRRHOSIS TYPE (HCC): ICD-10-CM

## 2017-11-24 DIAGNOSIS — R11.0 NAUSEA: ICD-10-CM

## 2017-11-24 DIAGNOSIS — K31.84 GASTROPARESIS: ICD-10-CM

## 2017-11-24 LAB — GLUCOSE BLDC GLUCOMTR-MCNC: 326 MG/DL (ref 70–130)

## 2017-11-24 PROCEDURE — 88312 SPECIAL STAINS GROUP 1: CPT | Performed by: INTERNAL MEDICINE

## 2017-11-24 PROCEDURE — 43239 EGD BIOPSY SINGLE/MULTIPLE: CPT | Performed by: INTERNAL MEDICINE

## 2017-11-24 PROCEDURE — 88305 TISSUE EXAM BY PATHOLOGIST: CPT | Performed by: INTERNAL MEDICINE

## 2017-11-24 PROCEDURE — 25010000002 PROPOFOL 1000 MG/ML EMULSION: Performed by: ANESTHESIOLOGY

## 2017-11-24 PROCEDURE — 88342 IMHCHEM/IMCYTCHM 1ST ANTB: CPT | Performed by: INTERNAL MEDICINE

## 2017-11-24 PROCEDURE — 25010000002 PROPOFOL 10 MG/ML EMULSION: Performed by: ANESTHESIOLOGY

## 2017-11-24 PROCEDURE — 82962 GLUCOSE BLOOD TEST: CPT

## 2017-11-24 RX ORDER — LABETALOL HYDROCHLORIDE 5 MG/ML
5 INJECTION, SOLUTION INTRAVENOUS
Status: DISCONTINUED | OUTPATIENT
Start: 2017-11-24 | End: 2017-11-24 | Stop reason: HOSPADM

## 2017-11-24 RX ORDER — PROPOFOL 10 MG/ML
VIAL (ML) INTRAVENOUS AS NEEDED
Status: DISCONTINUED | OUTPATIENT
Start: 2017-11-24 | End: 2017-11-24 | Stop reason: SURG

## 2017-11-24 RX ORDER — DIPHENHYDRAMINE HYDROCHLORIDE 50 MG/ML
6.25 INJECTION INTRAMUSCULAR; INTRAVENOUS
Status: DISCONTINUED | OUTPATIENT
Start: 2017-11-24 | End: 2017-11-24 | Stop reason: HOSPADM

## 2017-11-24 RX ORDER — HYDRALAZINE HYDROCHLORIDE 20 MG/ML
5 INJECTION INTRAMUSCULAR; INTRAVENOUS
Status: DISCONTINUED | OUTPATIENT
Start: 2017-11-24 | End: 2017-11-24 | Stop reason: HOSPADM

## 2017-11-24 RX ORDER — SODIUM CHLORIDE, SODIUM LACTATE, POTASSIUM CHLORIDE, CALCIUM CHLORIDE 600; 310; 30; 20 MG/100ML; MG/100ML; MG/100ML; MG/100ML
30 INJECTION, SOLUTION INTRAVENOUS CONTINUOUS
Status: DISCONTINUED | OUTPATIENT
Start: 2017-11-24 | End: 2017-11-24 | Stop reason: HOSPADM

## 2017-11-24 RX ORDER — CLOTRIMAZOLE 10 MG/1
10 LOZENGE ORAL; TOPICAL
Qty: 70 TABLET | Refills: 1 | Status: SHIPPED | OUTPATIENT
Start: 2017-11-24 | End: 2017-12-20 | Stop reason: HOSPADM

## 2017-11-24 RX ORDER — ONDANSETRON 2 MG/ML
4 INJECTION INTRAMUSCULAR; INTRAVENOUS ONCE AS NEEDED
Status: DISCONTINUED | OUTPATIENT
Start: 2017-11-24 | End: 2017-11-24 | Stop reason: HOSPADM

## 2017-11-24 RX ORDER — LIDOCAINE HYDROCHLORIDE 20 MG/ML
INJECTION, SOLUTION INFILTRATION; PERINEURAL AS NEEDED
Status: DISCONTINUED | OUTPATIENT
Start: 2017-11-24 | End: 2017-11-24 | Stop reason: SURG

## 2017-11-24 RX ORDER — NALOXONE HCL 0.4 MG/ML
0.4 VIAL (ML) INJECTION AS NEEDED
Status: DISCONTINUED | OUTPATIENT
Start: 2017-11-24 | End: 2017-11-24 | Stop reason: HOSPADM

## 2017-11-24 RX ORDER — SODIUM CHLORIDE 0.9 % (FLUSH) 0.9 %
1-10 SYRINGE (ML) INJECTION AS NEEDED
Status: DISCONTINUED | OUTPATIENT
Start: 2017-11-24 | End: 2017-11-24 | Stop reason: HOSPADM

## 2017-11-24 RX ORDER — EPHEDRINE SULFATE 50 MG/ML
5 INJECTION, SOLUTION INTRAVENOUS ONCE AS NEEDED
Status: DISCONTINUED | OUTPATIENT
Start: 2017-11-24 | End: 2017-11-24 | Stop reason: HOSPADM

## 2017-11-24 RX ORDER — FENTANYL CITRATE 50 UG/ML
25 INJECTION, SOLUTION INTRAMUSCULAR; INTRAVENOUS
Status: DISCONTINUED | OUTPATIENT
Start: 2017-11-24 | End: 2017-11-24 | Stop reason: HOSPADM

## 2017-11-24 RX ADMIN — PROPOFOL 100 MG: 10 INJECTION, EMULSION INTRAVENOUS at 09:09

## 2017-11-24 RX ADMIN — SODIUM CHLORIDE, POTASSIUM CHLORIDE, SODIUM LACTATE AND CALCIUM CHLORIDE 30 ML/HR: 600; 310; 30; 20 INJECTION, SOLUTION INTRAVENOUS at 08:45

## 2017-11-24 RX ADMIN — PROPOFOL 200 MCG/KG/MIN: 10 INJECTION, EMULSION INTRAVENOUS at 09:09

## 2017-11-24 RX ADMIN — LIDOCAINE HYDROCHLORIDE 60 MG: 20 INJECTION, SOLUTION INFILTRATION; PERINEURAL at 09:09

## 2017-11-24 NOTE — PLAN OF CARE
Problem: Patient Care Overview (Adult)  Goal: Plan of Care Review  Outcome: Ongoing (interventions implemented as appropriate)    11/24/17 0814   Coping/Psychosocial Response Interventions   Plan Of Care Reviewed With patient   Patient Care Overview   Progress progress toward functional goals as expected       Goal: Adult Individualization and Mutuality  Outcome: Ongoing (interventions implemented as appropriate)  Goal: Discharge Needs Assessment  Outcome: Ongoing (interventions implemented as appropriate)    11/24/17 0814   Discharge Needs Assessment   Concerns To Be Addressed no discharge needs identified   Discharge Disposition home or self-care   Living Environment   Transportation Available car;family or friend will provide         Problem: GI Endoscopy (Adult)  Intervention: Monitor/Manage Procedure Recovery    11/24/17 0814   Respiratory Interventions   Airway/Ventilation Management airway patency maintained   Coping/Psychosocial Interventions   Environmental Support calm environment promoted   Activity   Activity Type activity adjusted per tolerance       Intervention: Prevent Estefania-procedural Injury    11/24/17 0814   Positioning   Positioning side lying, left   Head Of Bed (HOB) Position HOB at 20 degrees         Goal: Signs and Symptoms of Listed Potential Problems Will be Absent or Manageable (GI Endoscopy)  Outcome: Ongoing (interventions implemented as appropriate)    11/24/17 0814   GI Endoscopy   Problems Assessed (GI Endoscopy) all   Problems Present (GI Endoscopy) none

## 2017-11-24 NOTE — ANESTHESIA PREPROCEDURE EVALUATION
Anesthesia Evaluation     history of anesthetic complications: PONV         Airway   Mallampati: II  no difficulty expected  Dental    (+) upper dentures    Pulmonary - normal exam   (+) sleep apnea,   (-) COPD, asthma, not a smoker    PE comment: nonlabored  Cardiovascular - normal exam    Rhythm: regular  Rate: normal    (+) hyperlipidemia  (-) hypertension, valvular problems/murmurs, past MI, CAD, dysrhythmias, angina      Neuro/Psych  (+) seizures well controlled, headaches, psychiatric history Anxiety and Depression,    (-) TIA, CVA  GI/Hepatic/Renal/Endo    (+)  hiatal hernia, PUD, liver disease (DUKES, cirrhosis), diabetes mellitus type 2 poorly controlled,   (-) GERD, hypothyroidism, hyperthyroidism    Musculoskeletal     (+) arthralgias,   Abdominal    Substance History      OB/GYN          Other   (+) arthritis                                     Anesthesia Plan    ASA 3     MAC     Anesthetic plan and risks discussed with patient.

## 2017-11-24 NOTE — ANESTHESIA POSTPROCEDURE EVALUATION
"Patient: Silvia Zabala    Procedure Summary     Date Anesthesia Start Anesthesia Stop Room / Location    11/24/17 0907 0918  [facility]       Procedure Diagnosis Surgeon Provider    ESOPHAGOGASTRODUODENOSCOPY with biopsies (N/A Esophagus) Gastroparesis; Nausea; Cirrhosis of liver without ascites, unspecified hepatic cirrhosis type  (Gastroparesis [K31.84]; Nausea [R11.0]; Cirrhosis of liver without ascites, unspecified hepatic cirrhosis type [K74.60]) MD Rich Aguiar MD          Anesthesia Type: MAC  Last vitals  BP   111/76 (11/24/17 0916)   Temp   37.1 °C (98.7 °F) (11/24/17 0916)   Pulse   109 (11/24/17 0916)   Resp   16 (11/24/17 0916)     SpO2   98 % (11/24/17 0916)     Post Anesthesia Care and Evaluation    Patient location during evaluation: bedside  Patient participation: complete - patient participated  Level of consciousness: awake  Pain management: adequate  Airway patency: patent  Anesthetic complications: No anesthetic complications    Cardiovascular status: acceptable  Respiratory status: acceptable  Hydration status: acceptable    Comments: */76 (BP Location: Left arm, Patient Position: Lying)  Pulse 109  Temp 37.1 °C (98.7 °F) (Oral)   Resp 16  Ht 66\" (167.6 cm)  Wt 168 lb (76.2 kg)  SpO2 98%  BMI 27.12 kg/m2        "

## 2017-11-28 LAB
CYTO UR: NORMAL
LAB AP CASE REPORT: NORMAL
Lab: NORMAL
PATH REPORT.FINAL DX SPEC: NORMAL
PATH REPORT.GROSS SPEC: NORMAL

## 2017-11-29 DIAGNOSIS — F41.9 ANXIETY: ICD-10-CM

## 2017-11-29 RX ORDER — ALPRAZOLAM 0.5 MG/1
0.5 TABLET ORAL 2 TIMES DAILY PRN
Qty: 60 TABLET | Refills: 0 | OUTPATIENT
Start: 2017-11-29 | End: 2018-02-05 | Stop reason: SDUPTHER

## 2017-11-29 NOTE — TELEPHONE ENCOUNTER
received a refill request on Alprazolam from [facility]  Last RF 11-10-17  Last Reed 11-8-17  Last OV 10-20-17  Next OV 2-22-18

## 2017-12-08 RX ORDER — INSULIN GLARGINE 100 [IU]/ML
INJECTION, SOLUTION SUBCUTANEOUS
Qty: 10 PEN | Refills: 3 | Status: SHIPPED | OUTPATIENT
Start: 2017-12-08 | End: 2017-12-16 | Stop reason: SDUPTHER

## 2017-12-16 ENCOUNTER — HOSPITAL ENCOUNTER (INPATIENT)
Facility: HOSPITAL | Age: 57
LOS: 4 days | Discharge: HOME | End: 2017-12-20
Attending: EMERGENCY MEDICINE | Admitting: INTERNAL MEDICINE

## 2017-12-16 DIAGNOSIS — E11.10 DIABETIC KETOACIDOSIS WITHOUT COMA ASSOCIATED WITH TYPE 2 DIABETES MELLITUS (HCC): Primary | ICD-10-CM

## 2017-12-16 DIAGNOSIS — K31.84 GASTROPARESIS: ICD-10-CM

## 2017-12-16 DIAGNOSIS — E87.1 HYPONATREMIA: ICD-10-CM

## 2017-12-16 DIAGNOSIS — E87.5 HYPERKALEMIA: ICD-10-CM

## 2017-12-16 DIAGNOSIS — K74.60 CIRRHOSIS OF LIVER WITHOUT ASCITES, UNSPECIFIED HEPATIC CIRRHOSIS TYPE (HCC): ICD-10-CM

## 2017-12-16 LAB
ALBUMIN SERPL-MCNC: 3.9 G/DL (ref 3.5–5.2)
ALBUMIN/GLOB SERPL: 0.9 G/DL
ALP SERPL-CCNC: 174 U/L (ref 39–117)
ALT SERPL W P-5'-P-CCNC: 34 U/L (ref 1–33)
ANION GAP SERPL CALCULATED.3IONS-SCNC: 22.6 MMOL/L
ANION GAP SERPL CALCULATED.3IONS-SCNC: 22.8 MMOL/L
ARTERIAL PATENCY WRIST A: ABNORMAL
AST SERPL-CCNC: 26 U/L (ref 1–32)
ATMOSPHERIC PRESS: 752.1 MMHG
B-OH-BUTYR SERPL-SCNC: 1.61 MMOL/L (ref 0.02–0.27)
BASE EXCESS BLDA CALC-SCNC: -5.3 MMOL/L (ref 0–2)
BASOPHILS # BLD AUTO: 0.03 10*3/MM3 (ref 0–0.2)
BASOPHILS NFR BLD AUTO: 0.5 % (ref 0–1.5)
BDY SITE: ABNORMAL
BILIRUB SERPL-MCNC: 1.2 MG/DL (ref 0.1–1.2)
BILIRUB UR QL STRIP: NEGATIVE
BUN BLD-MCNC: 16 MG/DL (ref 6–20)
BUN BLD-MCNC: 16 MG/DL (ref 6–20)
BUN/CREAT SERPL: 13.4 (ref 7–25)
BUN/CREAT SERPL: 14.4 (ref 7–25)
C DIFF TOX GENS STL QL NAA+PROBE: NEGATIVE
CA-I BLD-MCNC: 4.6 MG/DL (ref 4.6–5.4)
CA-I BLD-MCNC: 4.7 MG/DL (ref 4.6–5.4)
CA-I SERPL ISE-MCNC: 1.16 MMOL/L (ref 1.15–1.35)
CA-I SERPL ISE-MCNC: 1.18 MMOL/L (ref 1.15–1.35)
CALCIUM SPEC-SCNC: 8.5 MG/DL (ref 8.6–10.5)
CALCIUM SPEC-SCNC: 9.2 MG/DL (ref 8.6–10.5)
CHLORIDE SERPL-SCNC: 78 MMOL/L (ref 98–107)
CHLORIDE SERPL-SCNC: 85 MMOL/L (ref 98–107)
CLARITY UR: CLEAR
CO2 SERPL-SCNC: 18.2 MMOL/L (ref 22–29)
CO2 SERPL-SCNC: 18.4 MMOL/L (ref 22–29)
COLOR UR: YELLOW
CREAT BLD-MCNC: 1.11 MG/DL (ref 0.57–1)
CREAT BLD-MCNC: 1.19 MG/DL (ref 0.57–1)
DEPRECATED RDW RBC AUTO: 49.5 FL (ref 37–54)
EOSINOPHIL # BLD AUTO: 0.06 10*3/MM3 (ref 0–0.7)
EOSINOPHIL NFR BLD AUTO: 1.1 % (ref 0.3–6.2)
ERYTHROCYTE [DISTWIDTH] IN BLOOD BY AUTOMATED COUNT: 15.3 % (ref 11.7–13)
GFR SERPL CREATININE-BSD FRML MDRD: 47 ML/MIN/1.73
GFR SERPL CREATININE-BSD FRML MDRD: 51 ML/MIN/1.73
GLOBULIN UR ELPH-MCNC: 4.4 GM/DL
GLUCOSE BLD-MCNC: 717 MG/DL (ref 65–99)
GLUCOSE BLD-MCNC: 842 MG/DL (ref 65–99)
GLUCOSE BLD-MCNC: 981 MG/DL (ref 65–99)
GLUCOSE BLDC GLUCOMTR-MCNC: 516 MG/DL (ref 70–130)
GLUCOSE BLDC GLUCOMTR-MCNC: 570 MG/DL (ref 70–130)
GLUCOSE BLDC GLUCOMTR-MCNC: >599 MG/DL (ref 70–130)
GLUCOSE UR STRIP-MCNC: ABNORMAL MG/DL
HBA1C MFR BLD: 11.2 % (ref 4.8–5.6)
HCO3 BLDA-SCNC: 17 MMOL/L (ref 22–28)
HCT VFR BLD AUTO: 42.2 % (ref 35.6–45.5)
HGB BLD-MCNC: 14 G/DL (ref 11.9–15.5)
HGB UR QL STRIP.AUTO: NEGATIVE
HOLD SPECIMEN: NORMAL
HOLD SPECIMEN: NORMAL
IMM GRANULOCYTES # BLD: 0.03 10*3/MM3 (ref 0–0.03)
IMM GRANULOCYTES NFR BLD: 0.5 % (ref 0–0.5)
KETONES UR QL STRIP: ABNORMAL
LEUKOCYTE ESTERASE UR QL STRIP.AUTO: NEGATIVE
LIPASE SERPL-CCNC: 22 U/L (ref 13–60)
LYMPHOCYTES # BLD AUTO: 0.96 10*3/MM3 (ref 0.9–4.8)
LYMPHOCYTES NFR BLD AUTO: 17.1 % (ref 19.6–45.3)
MAGNESIUM SERPL-MCNC: 1.9 MG/DL (ref 1.6–2.6)
MCH RBC QN AUTO: 29.7 PG (ref 26.9–32)
MCHC RBC AUTO-ENTMCNC: 33.2 G/DL (ref 32.4–36.3)
MCV RBC AUTO: 89.6 FL (ref 80.5–98.2)
MODALITY: ABNORMAL
MONOCYTES # BLD AUTO: 0.37 10*3/MM3 (ref 0.2–1.2)
MONOCYTES NFR BLD AUTO: 6.6 % (ref 5–12)
NEUTROPHILS # BLD AUTO: 4.18 10*3/MM3 (ref 1.9–8.1)
NEUTROPHILS NFR BLD AUTO: 74.2 % (ref 42.7–76)
NITRITE UR QL STRIP: NEGATIVE
PCO2 BLDA: 24.2 MM HG (ref 35–45)
PH BLDA: 7.45 PH UNITS (ref 7.35–7.45)
PH UR STRIP.AUTO: 6 [PH] (ref 5–8)
PHOSPHATE SERPL-MCNC: 1.6 MG/DL (ref 2.5–4.5)
PLATELET # BLD AUTO: 124 10*3/MM3 (ref 140–500)
PMV BLD AUTO: 10.7 FL (ref 6–12)
PO2 BLDA: 76.9 MM HG (ref 80–100)
POTASSIUM BLD-SCNC: 4.1 MMOL/L (ref 3.5–5.2)
POTASSIUM BLD-SCNC: 6 MMOL/L (ref 3.5–5.2)
PROT SERPL-MCNC: 8.3 G/DL (ref 6–8.5)
PROT UR QL STRIP: NEGATIVE
RBC # BLD AUTO: 4.71 10*6/MM3 (ref 3.9–5.2)
SAO2 % BLDCOA: 96.2 % (ref 92–99)
SODIUM BLD-SCNC: 119 MMOL/L (ref 136–145)
SODIUM BLD-SCNC: 126 MMOL/L (ref 136–145)
SP GR UR STRIP: >=1.03 (ref 1–1.03)
TOTAL RATE: 28 BREATHS/MINUTE
UROBILINOGEN UR QL STRIP: ABNORMAL
WBC NRBC COR # BLD: 5.63 10*3/MM3 (ref 4.5–10.7)
WHOLE BLOOD HOLD SPECIMEN: NORMAL
WHOLE BLOOD HOLD SPECIMEN: NORMAL

## 2017-12-16 PROCEDURE — 25810000003 POTASSIUM CHLORIDE PER 2 MEQ: Performed by: INTERNAL MEDICINE

## 2017-12-16 PROCEDURE — 80048 BASIC METABOLIC PNL TOTAL CA: CPT | Performed by: PHYSICIAN ASSISTANT

## 2017-12-16 PROCEDURE — 93010 ELECTROCARDIOGRAM REPORT: CPT | Performed by: INTERNAL MEDICINE

## 2017-12-16 PROCEDURE — 25010000002 FLUCONAZOLE PER 200 MG: Performed by: INTERNAL MEDICINE

## 2017-12-16 PROCEDURE — 63710000001 INSULIN REGULAR HUMAN PER 5 UNITS: Performed by: PHYSICIAN ASSISTANT

## 2017-12-16 PROCEDURE — 25010000002 ONDANSETRON PER 1 MG: Performed by: INTERNAL MEDICINE

## 2017-12-16 PROCEDURE — 36600 WITHDRAWAL OF ARTERIAL BLOOD: CPT

## 2017-12-16 PROCEDURE — 82962 GLUCOSE BLOOD TEST: CPT

## 2017-12-16 PROCEDURE — 82947 ASSAY GLUCOSE BLOOD QUANT: CPT | Performed by: INTERNAL MEDICINE

## 2017-12-16 PROCEDURE — 87328 CRYPTOSPORIDIUM AG IA: CPT | Performed by: INTERNAL MEDICINE

## 2017-12-16 PROCEDURE — 87046 STOOL CULTR AEROBIC BACT EA: CPT | Performed by: INTERNAL MEDICINE

## 2017-12-16 PROCEDURE — 87493 C DIFF AMPLIFIED PROBE: CPT | Performed by: PHYSICIAN ASSISTANT

## 2017-12-16 PROCEDURE — 83735 ASSAY OF MAGNESIUM: CPT | Performed by: PHYSICIAN ASSISTANT

## 2017-12-16 PROCEDURE — 80053 COMPREHEN METABOLIC PANEL: CPT | Performed by: EMERGENCY MEDICINE

## 2017-12-16 PROCEDURE — 87329 GIARDIA AG IA: CPT | Performed by: INTERNAL MEDICINE

## 2017-12-16 PROCEDURE — 82803 BLOOD GASES ANY COMBINATION: CPT

## 2017-12-16 PROCEDURE — 93005 ELECTROCARDIOGRAM TRACING: CPT | Performed by: PHYSICIAN ASSISTANT

## 2017-12-16 PROCEDURE — 82330 ASSAY OF CALCIUM: CPT | Performed by: INTERNAL MEDICINE

## 2017-12-16 PROCEDURE — 99284 EMERGENCY DEPT VISIT MOD MDM: CPT

## 2017-12-16 PROCEDURE — 87899 AGENT NOS ASSAY W/OPTIC: CPT | Performed by: INTERNAL MEDICINE

## 2017-12-16 PROCEDURE — 81003 URINALYSIS AUTO W/O SCOPE: CPT | Performed by: EMERGENCY MEDICINE

## 2017-12-16 PROCEDURE — 25010000002 ENOXAPARIN PER 10 MG: Performed by: INTERNAL MEDICINE

## 2017-12-16 PROCEDURE — 82010 KETONE BODYS QUAN: CPT | Performed by: PHYSICIAN ASSISTANT

## 2017-12-16 PROCEDURE — 89125 SPECIMEN FAT STAIN: CPT | Performed by: INTERNAL MEDICINE

## 2017-12-16 PROCEDURE — 83631 LACTOFERRIN FECAL (QUANT): CPT | Performed by: INTERNAL MEDICINE

## 2017-12-16 PROCEDURE — 82330 ASSAY OF CALCIUM: CPT | Performed by: PHYSICIAN ASSISTANT

## 2017-12-16 PROCEDURE — 85025 COMPLETE CBC W/AUTO DIFF WBC: CPT | Performed by: EMERGENCY MEDICINE

## 2017-12-16 PROCEDURE — 84100 ASSAY OF PHOSPHORUS: CPT | Performed by: PHYSICIAN ASSISTANT

## 2017-12-16 PROCEDURE — 83690 ASSAY OF LIPASE: CPT | Performed by: EMERGENCY MEDICINE

## 2017-12-16 PROCEDURE — 36415 COLL VENOUS BLD VENIPUNCTURE: CPT | Performed by: EMERGENCY MEDICINE

## 2017-12-16 PROCEDURE — 83036 HEMOGLOBIN GLYCOSYLATED A1C: CPT | Performed by: INTERNAL MEDICINE

## 2017-12-16 PROCEDURE — 87045 FECES CULTURE AEROBIC BACT: CPT | Performed by: INTERNAL MEDICINE

## 2017-12-16 RX ORDER — POTASSIUM CHLORIDE 1.5 G/1.77G
10 POWDER, FOR SOLUTION ORAL AS NEEDED
Status: DISCONTINUED | OUTPATIENT
Start: 2017-12-16 | End: 2017-12-19

## 2017-12-16 RX ORDER — POTASSIUM CHLORIDE 1.5 G/1.77G
40 POWDER, FOR SOLUTION ORAL AS NEEDED
Status: DISCONTINUED | OUTPATIENT
Start: 2017-12-16 | End: 2017-12-20 | Stop reason: HOSPADM

## 2017-12-16 RX ORDER — ONDANSETRON 4 MG/1
4 TABLET, ORALLY DISINTEGRATING ORAL EVERY 6 HOURS PRN
Status: DISCONTINUED | OUTPATIENT
Start: 2017-12-16 | End: 2017-12-20 | Stop reason: HOSPADM

## 2017-12-16 RX ORDER — DEXTROSE, SODIUM CHLORIDE, AND POTASSIUM CHLORIDE 5; .45; .15 G/100ML; G/100ML; G/100ML
150 INJECTION INTRAVENOUS CONTINUOUS PRN
Status: DISCONTINUED | OUTPATIENT
Start: 2017-12-16 | End: 2017-12-19

## 2017-12-16 RX ORDER — SODIUM CHLORIDE 450 MG/100ML
250 INJECTION, SOLUTION INTRAVENOUS CONTINUOUS
Status: DISCONTINUED | OUTPATIENT
Start: 2017-12-16 | End: 2017-12-18

## 2017-12-16 RX ORDER — FLUCONAZOLE 2 MG/ML
100 INJECTION, SOLUTION INTRAVENOUS DAILY
Status: DISCONTINUED | OUTPATIENT
Start: 2017-12-16 | End: 2017-12-20 | Stop reason: HOSPADM

## 2017-12-16 RX ORDER — ONDANSETRON 4 MG/1
4 TABLET, FILM COATED ORAL EVERY 6 HOURS PRN
Status: DISCONTINUED | OUTPATIENT
Start: 2017-12-16 | End: 2017-12-20 | Stop reason: HOSPADM

## 2017-12-16 RX ORDER — DEXTROSE AND SODIUM CHLORIDE 5; .45 G/100ML; G/100ML
150 INJECTION, SOLUTION INTRAVENOUS CONTINUOUS PRN
Status: DISCONTINUED | OUTPATIENT
Start: 2017-12-16 | End: 2017-12-19

## 2017-12-16 RX ORDER — SODIUM CHLORIDE 450 MG/100ML
250 INJECTION, SOLUTION INTRAVENOUS CONTINUOUS
Status: DISCONTINUED | OUTPATIENT
Start: 2017-12-16 | End: 2017-12-16

## 2017-12-16 RX ORDER — DEXTROSE MONOHYDRATE 25 G/50ML
12.5 INJECTION, SOLUTION INTRAVENOUS
Status: DISCONTINUED | OUTPATIENT
Start: 2017-12-16 | End: 2017-12-19

## 2017-12-16 RX ORDER — POTASSIUM CHLORIDE 750 MG/1
20 CAPSULE, EXTENDED RELEASE ORAL AS NEEDED
Status: DISCONTINUED | OUTPATIENT
Start: 2017-12-16 | End: 2017-12-19

## 2017-12-16 RX ORDER — INSULIN GLARGINE 100 [IU]/ML
80 INJECTION, SOLUTION SUBCUTANEOUS NIGHTLY
COMMUNITY
End: 2018-04-05

## 2017-12-16 RX ORDER — POTASSIUM CHLORIDE 750 MG/1
40 CAPSULE, EXTENDED RELEASE ORAL AS NEEDED
Status: DISCONTINUED | OUTPATIENT
Start: 2017-12-16 | End: 2017-12-20 | Stop reason: HOSPADM

## 2017-12-16 RX ORDER — POTASSIUM CHLORIDE 7.46 G/1000ML
10 INJECTION, SOLUTION INTRAVENOUS
Status: DISCONTINUED | OUTPATIENT
Start: 2017-12-16 | End: 2017-12-19

## 2017-12-16 RX ORDER — SODIUM CHLORIDE 0.9 % (FLUSH) 0.9 %
10 SYRINGE (ML) INJECTION AS NEEDED
Status: DISCONTINUED | OUTPATIENT
Start: 2017-12-16 | End: 2017-12-20 | Stop reason: HOSPADM

## 2017-12-16 RX ORDER — SODIUM CHLORIDE 0.9 % (FLUSH) 0.9 %
1-10 SYRINGE (ML) INJECTION AS NEEDED
Status: DISCONTINUED | OUTPATIENT
Start: 2017-12-16 | End: 2017-12-20 | Stop reason: HOSPADM

## 2017-12-16 RX ORDER — SODIUM CHLORIDE AND POTASSIUM CHLORIDE 150; 450 MG/100ML; MG/100ML
250 INJECTION, SOLUTION INTRAVENOUS CONTINUOUS PRN
Status: DISCONTINUED | OUTPATIENT
Start: 2017-12-16 | End: 2017-12-16

## 2017-12-16 RX ORDER — ONDANSETRON 2 MG/ML
4 INJECTION INTRAMUSCULAR; INTRAVENOUS EVERY 6 HOURS PRN
Status: DISCONTINUED | OUTPATIENT
Start: 2017-12-16 | End: 2017-12-20 | Stop reason: HOSPADM

## 2017-12-16 RX ORDER — SODIUM CHLORIDE 9 MG/ML
9 INJECTION, SOLUTION INTRAVENOUS CONTINUOUS
Status: DISCONTINUED | OUTPATIENT
Start: 2017-12-16 | End: 2017-12-18

## 2017-12-16 RX ORDER — SODIUM CHLORIDE AND POTASSIUM CHLORIDE 150; 450 MG/100ML; MG/100ML
250 INJECTION, SOLUTION INTRAVENOUS CONTINUOUS PRN
Status: DISCONTINUED | OUTPATIENT
Start: 2017-12-16 | End: 2017-12-19

## 2017-12-16 RX ORDER — POTASSIUM CHLORIDE 750 MG/1
10 CAPSULE, EXTENDED RELEASE ORAL AS NEEDED
Status: DISCONTINUED | OUTPATIENT
Start: 2017-12-16 | End: 2017-12-19

## 2017-12-16 RX ORDER — POTASSIUM CHLORIDE 1.5 G/1.77G
20 POWDER, FOR SOLUTION ORAL AS NEEDED
Status: DISCONTINUED | OUTPATIENT
Start: 2017-12-16 | End: 2017-12-19

## 2017-12-16 RX ORDER — POTASSIUM CHLORIDE 7.46 G/1000ML
10 INJECTION, SOLUTION INTRAVENOUS
Status: DISCONTINUED | OUTPATIENT
Start: 2017-12-16 | End: 2017-12-20 | Stop reason: HOSPADM

## 2017-12-16 RX ADMIN — SODIUM CHLORIDE 9 ML/HR: 9 INJECTION, SOLUTION INTRAVENOUS at 23:24

## 2017-12-16 RX ADMIN — HUMAN INSULIN 10 UNITS: 100 INJECTION, SOLUTION SUBCUTANEOUS at 18:08

## 2017-12-16 RX ADMIN — ENOXAPARIN SODIUM 30 MG: 30 INJECTION SUBCUTANEOUS at 22:34

## 2017-12-16 RX ADMIN — SODIUM CHLORIDE 0.1 UNITS/KG/HR: 9 INJECTION, SOLUTION INTRAVENOUS at 18:46

## 2017-12-16 RX ADMIN — POTASSIUM CHLORIDE AND SODIUM CHLORIDE 250 ML/HR: 450; 150 INJECTION, SOLUTION INTRAVENOUS at 22:34

## 2017-12-16 RX ADMIN — SODIUM CHLORIDE 1000 ML: 9 INJECTION, SOLUTION INTRAVENOUS at 18:11

## 2017-12-16 RX ADMIN — FLUCONAZOLE 100 MG: 2 INJECTION INTRAVENOUS at 22:34

## 2017-12-16 RX ADMIN — SODIUM CHLORIDE 1000 ML: 9 INJECTION, SOLUTION INTRAVENOUS at 17:32

## 2017-12-16 RX ADMIN — ONDANSETRON 4 MG: 2 INJECTION INTRAMUSCULAR; INTRAVENOUS at 20:48

## 2017-12-16 NOTE — ED PROVIDER NOTES
Pt presents to the ED complaining of vomiting, nausea, and diarrhea.  Informed Pt of her blood sugar levels  And other lab results.  Discussed the plan to assess her ketone level and pH level. Discussed plan to admit Pt.      On exam, Pt is alert and oriented X 3.  Moderate distress.  Pt has a ketotic smell, pale conjunctiva, dry mucous membranes.  Regular rhythm.  Tachycardic 120, No pedal edema, normal neuro exam.      We are administering  IV fluids and IV insulin.    EKG    EKG time: 1827  Rhythm/Rate: Sinus Tachycardia 112  No Acute Ischemia  Non-Specific ST-T changes    QT of 492, No peaked T waves     Glucose over 900 with sodium of 119 and K of 6.0    unchanged compared to prior on April 2017 except now the rate is faster.     I consulted with Dr. Martinez, who will admit the patient to ICU for treatment of DKA.   He came to ED to see and admit patient.    Interpreted Contemporaneously by me.  Independently viewed by me    I supervised care provided by the midlevel provider.    We have discussed this patient's history, physical exam, and treatment plan.   I have reviewed the note and personally saw and examined the patient and agree with the plan of care.    Documentation assistance provided by tori Madrigal for Dr. Montelongo.  Information recorded by the scribe was done at my direction and has been verified and validated by me.           Daysi Madrigal  12/16/17 1925       Darinel Montelongo MD  12/16/17 2032

## 2017-12-16 NOTE — ED PROVIDER NOTES
EMERGENCY DEPARTMENT ENCOUNTER    CHIEF COMPLAINT  Chief Complaint: N/V/D  History given by: Patient  History limited by: None  Room Number: 11/11  PMD: Blanca Pitts MD      HPI:  Pt is a 57 y.o. female who presents to the ED complaining of N/V/D for the 10 days which is worse than her gastroparesis. She reports taking her normal doses of Reglan. She was prescribed Clotrimazole post status EDG that showed candidiasis.    Duration: 10 days  Timing: constant  Location: diffuse  Radiation: none  Quality: N/V/D  Intensity/Severity: moderate  Progression: unchanged  Associated Symptoms: none  Aggravating Factors: none  Alleviating Factors: none  Previous Episodes: Patient has history of gastroparesis.  Treatment before arrival: Prescribed Clotrimazole.         MEDICAL RECORD REVIEW  The patient has a history of lupus, RA, gastroparesis, seizure disorder, diabetes, cirrhosis of liver, and insulin diabetic. She has a history of CDiff and recently prescribed Clotrimazole.     PAST MEDICAL HISTORY  Active Ambulatory Problems     Diagnosis Date Noted   • Cirrhosis of liver 03/08/2016   • Rheumatoid arthritis 03/08/2016   • Anxiety and depression 03/08/2016   • Type 2 diabetes mellitus, uncontrolled, with neuropathy 03/15/2016   • Fibrositis 03/15/2016   • Change in blood platelet count 03/15/2016   • Pancytopenia 04/12/2016   • Hypersplenism 04/12/2016   • Nausea 06/14/2016   • DUKES (nonalcoholic steatohepatitis) 06/14/2016   • Hyperlipidemia    • UGI bleed 02/15/2017   • Ascites 02/21/2017   • Portal hypertension 02/22/2017   • Systemic lupus 02/22/2017   • Secondary esophageal varices without bleeding 02/22/2017   • Secondary esophageal varices with bleeding 03/07/2017   • Upper GI bleed 04/25/2017   • Gastroparesis 10/17/2017   • Cirrhosis of liver without ascites 11/17/2017     Resolved Ambulatory Problems     Diagnosis Date Noted   • Generalized abdominal pain 02/14/2017     Past Medical History:   Diagnosis Date   •  Anemia    • Anxiety    • Arthritis    • Chromosomal abnormality    • Cirrhosis    • Colon polyps    • Deafness    • Depression    • Diabetes mellitus    • Duodenal ulcer with hemorrhage    • Esophageal varices determined by endoscopy    • Fibromyalgia    • Gallbladder disease    • Gastroparesis    • Glaucoma    • Granuloma annulare    • H/O B12 deficiency    • H/O Bleeding ulcer    • H/O mixed connective tissue disorder    • Hemorrhoids    • Hiatal hernia    • History of transfusion    • Hx of being hospitalized    • Hyperlipidemia    • Migraine    • BRICE (obstructive sleep apnea)    • Pancreas divisum    • Pancytopenia    • Peptic ulcer disease    • PONV (postoperative nausea and vomiting)    • RA (rheumatoid arthritis)    • Seizure disorder    • Seizures    • Systemic lupus        PAST SURGICAL HISTORY  Past Surgical History:   Procedure Laterality Date   • BLADDER REPAIR  1991   • CATARACT EXTRACTION     • CHOLECYSTECTOMY  1994   • ENDOSCOPY N/A 11/7/2016    Procedure: ESOPHAGOGASTRODUODENOSCOPY WITH COLD POLYPECTOMY, BANDING,  AND CLIPS TIMES 2;  Surgeon: Rich Coleman MD;  Location: [facility] ENDOSCOPY;  Service:    • ENDOSCOPY N/A 12/22/2016    Procedure: ESOPHAGOGASTRODUODENOSCOPY WITH ESOPHAGEAL BANDING. 5 BANDS FIRED, 4 BANDS ADHERERD TO MUCOSA;  Surgeon: Rich Coleman MD;  Location: [facility] ENDOSCOPY;  Service:    • ENDOSCOPY N/A 2/15/2017    Procedure: ESOPHAGOGASTRODUODENOSCOPY AT BEDSIDE with esophageal varices banding (3);  Surgeon: Rich Coleman MD;  Location: [facility] ENDOSCOPY;  Service:    • ENDOSCOPY N/A 4/6/2017    Procedure: ESOPHAGOGASTRODUODENOSCOPY WITH ESOPHAGEAL VARICES BANDING x2;  Surgeon: Rich Coleman MD;  Location: [facility] ENDOSCOPY;  Service:    • ENDOSCOPY N/A 11/24/2017    Procedure: ESOPHAGOGASTRODUODENOSCOPY with biopsies;  Surgeon: Rich Coleman MD;  Location: [facility] ENDOSCOPY;  Service:    • ENDOSCOPY AND COLONOSCOPY  2014    Dr. Rich Coleman with findings of elizabeth  "esophagitis   • EYE SURGERY     • HYSTERECTOMY  1986    partial   • JOINT REPLACEMENT     • KNEE SURGERY Right 1995    \"right knee recontstruction\"   • LIVER BIOPSY  01/2015   • MASS EXCISION     • STOMACH SURGERY         FAMILY HISTORY  Family History   Problem Relation Age of Onset   • Liver disease Other    • Depression Other    • Heart disease Other    • Hypertension Other    • Diabetes Other    • Breast cancer Other    • Brain cancer Other    • Uterine cancer Other    • Colon cancer Other    • Leukemia Other    • Colon cancer Maternal Aunt    • Hypertension Mother    • Diabetes type II Mother    • Rheum arthritis Mother    • Liver disease Mother      DUKES   • Heart disease Father    • Diabetes type II Father    • Diabetes type II Sister    • Lupus Sister    • Malig Hyperthermia Neg Hx        SOCIAL HISTORY  Social History     Social History   • Marital status:      Spouse name: Jose   • Number of children: N/A   • Years of education: N/A     Occupational History   •  Disabled     Social History Main Topics   • Smoking status: Former Smoker     Packs/day: 0.00     Years: 0.00     Quit date: 12/17/2015   • Smokeless tobacco: Never Used   • Alcohol use No   • Drug use: No   • Sexual activity: Defer     Other Topics Concern   • Not on file     Social History Narrative    Moved to [place] from [place]. She is currently medically disabled.       ALLERGIES  Albuterol; Tramadol; and Quinine derivatives    REVIEW OF SYSTEMS  Review of Systems   Constitutional: Negative for fever.   HENT: Negative for sore throat.    Eyes: Negative.    Respiratory: Negative for cough and shortness of breath.    Cardiovascular: Negative for chest pain.   Gastrointestinal: Positive for diarrhea, nausea and vomiting. Negative for abdominal pain.   Genitourinary: Negative for dysuria.   Musculoskeletal: Negative for neck pain.   Skin: Negative for rash.   Allergic/Immunologic: Negative.    Neurological: Negative for weakness, " numbness and headaches.   Hematological: Negative.    Psychiatric/Behavioral: Negative.    All other systems reviewed and are negative.      PHYSICAL EXAM  ED Triage Vitals   Temp Heart Rate Resp BP SpO2   12/16/17 1631 12/16/17 1631 12/16/17 1636 12/16/17 1636 12/16/17 1631   98.1 °F (36.7 °C) 128 20 162/85 99 %      Temp src Heart Rate Source Patient Position BP Location FiO2 (%)   12/16/17 1631 12/16/17 1631 -- -- --   Tympanic Monitor          Physical Exam   Constitutional: She is oriented to person, place, and time and well-developed, well-nourished, and in no distress. No distress.   HENT:   Head: Normocephalic and atraumatic.   Mouth/Throat: Oropharynx is clear and moist. Mucous membranes are dry.   Eyes: EOM are normal.   Neck: Normal range of motion. Neck supple.   Cardiovascular: Regular rhythm.  Tachycardia present.    Pulmonary/Chest: Effort normal and breath sounds normal. No respiratory distress. She has no wheezes. She exhibits no tenderness.   Abdominal: Soft. She exhibits no distension. There is generalized tenderness (chronic). There is no rebound.   Musculoskeletal: Normal range of motion. She exhibits no edema.   Lymphadenopathy:     She has no cervical adenopathy.   Neurological: She is alert and oriented to person, place, and time.   Skin: Skin is warm and dry. No rash noted. No pallor.   Psychiatric: Mood, memory, affect and judgment normal.   Nursing note and vitals reviewed.      LAB RESULTS  Recent Results (from the past 24 hour(s))   Comprehensive Metabolic Panel    Collection Time: 12/16/17  4:50 PM   Result Value Ref Range    Glucose 981 (C) 65 - 99 mg/dL    BUN 16 6 - 20 mg/dL    Creatinine 1.19 (H) 0.57 - 1.00 mg/dL    Sodium 119 (C) 136 - 145 mmol/L    Potassium 6.0 (H) 3.5 - 5.2 mmol/L    Chloride 78 (L) 98 - 107 mmol/L    CO2 18.4 (L) 22.0 - 29.0 mmol/L    Calcium 9.2 8.6 - 10.5 mg/dL    Total Protein 8.3 6.0 - 8.5 g/dL    Albumin 3.90 3.50 - 5.20 g/dL    ALT (SGPT) 34 (H) 1 - 33  U/L    AST (SGOT) 26 1 - 32 U/L    Alkaline Phosphatase 174 (H) 39 - 117 U/L    Total Bilirubin 1.2 0.1 - 1.2 mg/dL    eGFR Non African Amer 47 (L) >60 mL/min/1.73    Globulin 4.4 gm/dL    A/G Ratio 0.9 g/dL    BUN/Creatinine Ratio 13.4 7.0 - 25.0    Anion Gap 22.6 mmol/L   Lipase    Collection Time: 12/16/17  4:50 PM   Result Value Ref Range    Lipase 22 13 - 60 U/L   Light Blue Top    Collection Time: 12/16/17  4:50 PM   Result Value Ref Range    Extra Tube hold for add-on    Green Top (Gel)    Collection Time: 12/16/17  4:50 PM   Result Value Ref Range    Extra Tube Hold for add-ons.    Lavender Top    Collection Time: 12/16/17  4:50 PM   Result Value Ref Range    Extra Tube hold for add-on    Gold Top - SST    Collection Time: 12/16/17  4:50 PM   Result Value Ref Range    Extra Tube Hold for add-ons.    CBC Auto Differential    Collection Time: 12/16/17  4:50 PM   Result Value Ref Range    WBC 5.63 4.50 - 10.70 10*3/mm3    RBC 4.71 3.90 - 5.20 10*6/mm3    Hemoglobin 14.0 11.9 - 15.5 g/dL    Hematocrit 42.2 35.6 - 45.5 %    MCV 89.6 80.5 - 98.2 fL    MCH 29.7 26.9 - 32.0 pg    MCHC 33.2 32.4 - 36.3 g/dL    RDW 15.3 (H) 11.7 - 13.0 %    RDW-SD 49.5 37.0 - 54.0 fl    MPV 10.7 6.0 - 12.0 fL    Platelets 124 (L) 140 - 500 10*3/mm3    Neutrophil % 74.2 42.7 - 76.0 %    Lymphocyte % 17.1 (L) 19.6 - 45.3 %    Monocyte % 6.6 5.0 - 12.0 %    Eosinophil % 1.1 0.3 - 6.2 %    Basophil % 0.5 0.0 - 1.5 %    Immature Grans % 0.5 0.0 - 0.5 %    Neutrophils, Absolute 4.18 1.90 - 8.10 10*3/mm3    Lymphocytes, Absolute 0.96 0.90 - 4.80 10*3/mm3    Monocytes, Absolute 0.37 0.20 - 1.20 10*3/mm3    Eosinophils, Absolute 0.06 0.00 - 0.70 10*3/mm3    Basophils, Absolute 0.03 0.00 - 0.20 10*3/mm3    Immature Grans, Absolute 0.03 0.00 - 0.03 10*3/mm3   Hemoglobin A1c    Collection Time: 12/16/17  4:50 PM   Result Value Ref Range    Hemoglobin A1C 11.20 (H) 4.80 - 5.60 %   Clostridium Difficile Toxin, PCR - Stool, Per Rectum    Collection  Time: 12/16/17  5:07 PM   Result Value Ref Range    C. Difficile Toxins by PCR Negative Negative   POC Glucose Once    Collection Time: 12/16/17  5:37 PM   Result Value Ref Range    Glucose >599 (C) 70 - 130 mg/dL   Blood Gas, Arterial    Collection Time: 12/16/17  6:09 PM   Result Value Ref Range    Site Arterial: right brachial     Mayur's Test N/A     pH, Arterial 7.454 (H) 7.350 - 7.450 pH units    pCO2, Arterial 24.2 (L) 35.0 - 45.0 mm Hg    pO2, Arterial 76.9 (L) 80.0 - 100.0 mm Hg    HCO3, Arterial 17.0 (L) 22.0 - 28.0 mmol/L    Base Excess, Arterial -5.3 (L) 0.0 - 2.0 mmol/L    O2 Saturation Calculated 96.2 92.0 - 99.0 %    Barometric Pressure for Blood Gas 752.1 mmHg    Modality Room Air     Rate 28 Breaths/minute   Urinalysis With / Culture If Indicated - Urine, Clean Catch    Collection Time: 12/16/17  6:23 PM   Result Value Ref Range    Color, UA Yellow Yellow, Straw    Appearance, UA Clear Clear    pH, UA 6.0 5.0 - 8.0    Specific Gravity, UA >=1.030 1.005 - 1.030    Glucose, UA >=1000 mg/dL (3+) (A) Negative    Ketones, UA 15 mg/dL (1+) (A) Negative    Bilirubin, UA Negative Negative    Blood, UA Negative Negative    Protein, UA Negative Negative    Leuk Esterase, UA Negative Negative    Nitrite, UA Negative Negative    Urobilinogen, UA 0.2 E.U./dL 0.2 - 1.0 E.U./dL   Beta Hydroxybutyrate Quantitative    Collection Time: 12/16/17  6:39 PM   Result Value Ref Range    Beta-Hydroxybutyrate Quant 1.610 (H) 0.020 - 0.270 mmol/L   Phosphorus    Collection Time: 12/16/17  6:39 PM   Result Value Ref Range    Phosphorus 1.6 (L) 2.5 - 4.5 mg/dL   Basic Metabolic Panel    Collection Time: 12/16/17  6:39 PM   Result Value Ref Range    Glucose 842 (C) 65 - 99 mg/dL    BUN 16 6 - 20 mg/dL    Creatinine 1.11 (H) 0.57 - 1.00 mg/dL    Sodium 126 (L) 136 - 145 mmol/L    Potassium 4.1 3.5 - 5.2 mmol/L    Chloride 85 (L) 98 - 107 mmol/L    CO2 18.2 (L) 22.0 - 29.0 mmol/L    Calcium 8.5 (L) 8.6 - 10.5 mg/dL    eGFR Non   Amer 51 (L) >60 mL/min/1.73    BUN/Creatinine Ratio 14.4 7.0 - 25.0    Anion Gap 22.8 mmol/L   Magnesium    Collection Time: 12/16/17  6:39 PM   Result Value Ref Range    Magnesium 1.9 1.6 - 2.6 mg/dL   Calcium, Ionized    Collection Time: 12/16/17  6:39 PM   Result Value Ref Range    Ionized Calcium 1.16 1.15 - 1.35 mmol/L    Ionized Calcium 4.6 4.6 - 5.4 mg/dL   POC Glucose Once    Collection Time: 12/16/17  6:55 PM   Result Value Ref Range    Glucose >599 (C) 70 - 130 mg/dL   POC Glucose Once    Collection Time: 12/16/17  8:05 PM   Result Value Ref Range    Glucose >599 (C) 70 - 130 mg/dL   POC Glucose Once    Collection Time: 12/16/17  8:12 PM   Result Value Ref Range    Glucose >599 (C) 70 - 130 mg/dL       I ordered the above labs and reviewed the results      EKG    EKG was interpreted by Dr. Blake.      COURSE & MEDICAL DECISION MAKING  Pertinent Labs and Imaging studies that were ordered and reviewed are noted above.  Results were reviewed/discussed with the patient and they were also made aware of online assess.  Pt also made aware that some labs, such as cultures, will not be resulted during ER visit and follow up with PMD is necessary.     PROGRESS AND CONSULTS  Critical Care  Performed by: ROBYN CONTRERAS  Authorized by: BRAXTON BLAKE     Critical care provider statement:     Critical care time (minutes):  35    Critical care start time:  12/16/2017 5:21 PM    Critical care end time:  12/16/2017 5:56 PM    Critical care was necessary to treat or prevent imminent or life-threatening deterioration of the following conditions:  Dehydration and endocrine crisis    Critical care was time spent personally by me on the following activities:  Blood draw for specimens, development of treatment plan with patient or surrogate, discussions with consultants, evaluation of patient's response to treatment, examination of patient, ordering and performing treatments and interventions, ordering and review  of laboratory studies, pulse oximetry, re-evaluation of patient's condition and review of old charts          PROGRESS AND CONSULTS    Progress Notes:    ED Course     1639  Ordered CBC, UA, Lipase, and CMP for further evaluation.     1745  Reviewed pt's history and workup with Dr. Montelongo.  After a bedside evaluation; Dr. Montelongo agrees with the plan of care.    1746  Ordered EKG for further evaluation.     1748  Ordered BMP and Phosphorous for further evaluation. Placed call to Nutrition and Diabetes educator for consult.     1750  Based on the patient's lab findings and presenting symptoms, the doctor and I feel it is appropriate to admit the patient for further management, evaluation, and treatment.  I have discussed this with the admitting team.  I have also discussed this with the patient/family.  They are in agreement with admission.      1818  Placed call to Pulmonology for admission.    1845  Discussed the patient's case with Dr. Martinez who agrees to admit the patient to an ICU bed.    MEDICATIONS GIVEN IN ER  Medications   sodium chloride 0.9 % flush 10 mL (not administered)   sodium chloride 0.45 % infusion (not administered)   sodium chloride 0.45 % with KCl 20 mEq/L infusion (not administered)   dextrose 5 % and sodium chloride 0.45 % infusion (not administered)   dextrose 5 % and sodium chloride 0.45 % with KCl 20 mEq/L infusion (not administered)   insulin regular (humuLIN R,novoLIN R) 100 Units in sodium chloride 0.9 % 100 mL (1 Units/mL) infusion (0.1 Units/kg/hr × 76.2 kg Intravenous New Bag 12/16/17 1846)   insulin regular (humuLIN R,novoLIN R) injection 7.6 Units (not administered)   dextrose (D50W) solution 12.5 g (not administered)   sodium chloride 0.9 % flush 1-10 mL (not administered)   ondansetron (ZOFRAN) tablet 4 mg (not administered)     Or   ondansetron ODT (ZOFRAN-ODT) disintegrating tablet 4 mg (not administered)     Or   ondansetron (ZOFRAN) injection 4 mg (not administered)  "  enoxaparin (LOVENOX) syringe 30 mg (not administered)   fluconazole (DIFLUCAN) IVPB 100 mg in 50mL NaCl (not administered)   sodium chloride 0.9 % bolus 1,000 mL (not administered)   sodium chloride 0.45 % infusion (not administered)   sodium chloride 0.45 % with KCl 20 mEq/L infusion (not administered)   dextrose 5 % and sodium chloride 0.45 % infusion (not administered)   dextrose 5 % and sodium chloride 0.45 % with KCl 20 mEq/L infusion (not administered)   potassium chloride (MICRO-K) CR capsule 40 mEq (not administered)     Or   potassium chloride (KLOR-CON) packet 40 mEq (not administered)     Or   potassium chloride 10 mEq in 100 mL IVPB (not administered)   potassium chloride (MICRO-K) CR capsule 20 mEq (not administered)     Or   potassium chloride (KLOR-CON) packet 20 mEq (not administered)     Or   potassium chloride 10 mEq in 100 mL IVPB (not administered)   potassium chloride (MICRO-K) CR capsule 10 mEq (not administered)     Or   potassium chloride (KLOR-CON) packet 10 mEq (not administered)     Or   potassium chloride 10 mEq in 100 mL IVPB (not administered)   insulin regular (humuLIN R,novoLIN R) injection 7.6 Units (not administered)   insulin regular (humuLIN R,novoLIN R) 100 Units in sodium chloride 0.9 % 100 mL (1 Units/mL) infusion (not administered)   insulin regular (humuLIN R,novoLIN R) injection 7.6 Units (not administered)   dextrose (D50W) solution 12.5 g (not administered)   sodium chloride 0.9 % bolus 1,000 mL (0 mL Intravenous Stopped 12/16/17 1810)   insulin regular (humuLIN R,novoLIN R) injection 10 Units (10 Units Intravenous Given 12/16/17 1808)   sodium chloride 0.9 % bolus 1,000 mL (1,000 mL Intravenous New Bag 12/16/17 1811)       /89  Pulse 105  Temp 98.1 °F (36.7 °C) (Tympanic)   Resp 18  Ht 167.6 cm (66\")  Wt 76.2 kg (168 lb)  SpO2 98%  BMI 27.12 kg/m2      DIAGNOSIS  Final diagnoses:   Diabetic ketoacidosis without coma associated with type 2 diabetes mellitus "   Hyperkalemia   Hyponatremia   Gastroparesis   Cirrhosis of liver without ascites, unspecified hepatic cirrhosis type       Reed report reviewed.  Risks, benefits, alternatives discussed with patient.  Pt consents to treatment and agrees to follow up with PMD tomorrow for further care and any other prescriptions.       Documentation assistance provided by tori Penn for Teena Pang PA-C.  Information recorded by the scribe was done at my direction and has been verified and validated by me.  Electronically signed by Douglas Penn on 12/16/2017 at time 8:19 PM        Douglas Penn  12/16/17 1847       Teena Pang PA-C  12/16/17 2019

## 2017-12-16 NOTE — ED NOTES
"C/o N/V/D. Vomited x4 and unable to take medicines. diarrhea x7. No bld in emesis, possible bld in stool \"but I have hemorrhoids.\"    Has been on clotimazole for few days and reports yeast infection as well now.     +lightheaded     Rocío Taylor RN  12/16/17 [id number]    "

## 2017-12-17 ENCOUNTER — APPOINTMENT (OUTPATIENT)
Dept: CT IMAGING | Facility: HOSPITAL | Age: 57
End: 2017-12-17

## 2017-12-17 PROBLEM — E11.42 DIABETIC PERIPHERAL NEUROPATHY (HCC): Status: ACTIVE | Noted: 2017-12-17

## 2017-12-17 PROBLEM — K92.2 UPPER GI BLEED: Status: RESOLVED | Noted: 2017-04-25 | Resolved: 2017-12-17

## 2017-12-17 PROBLEM — Z86.69 HISTORY OF SEIZURE DISORDER: Status: ACTIVE | Noted: 2017-12-17

## 2017-12-17 LAB
ANION GAP SERPL CALCULATED.3IONS-SCNC: 12.2 MMOL/L
ANION GAP SERPL CALCULATED.3IONS-SCNC: 13.9 MMOL/L
ANION GAP SERPL CALCULATED.3IONS-SCNC: 14 MMOL/L
BUN BLD-MCNC: 11 MG/DL (ref 6–20)
BUN BLD-MCNC: 6 MG/DL (ref 6–20)
BUN BLD-MCNC: 9 MG/DL (ref 6–20)
BUN/CREAT SERPL: 13 (ref 7–25)
BUN/CREAT SERPL: 14.5 (ref 7–25)
BUN/CREAT SERPL: 8.6 (ref 7–25)
CA-I BLD-MCNC: 4.3 MG/DL (ref 4.6–5.4)
CA-I BLD-MCNC: 4.6 MG/DL (ref 4.6–5.4)
CA-I BLD-MCNC: 4.8 MG/DL (ref 4.6–5.4)
CA-I SERPL ISE-MCNC: 1.08 MMOL/L (ref 1.15–1.35)
CA-I SERPL ISE-MCNC: 1.16 MMOL/L (ref 1.15–1.35)
CA-I SERPL ISE-MCNC: 1.19 MMOL/L (ref 1.15–1.35)
CALCIUM SPEC-SCNC: 8 MG/DL (ref 8.6–10.5)
CALCIUM SPEC-SCNC: 8.2 MG/DL (ref 8.6–10.5)
CALCIUM SPEC-SCNC: 8.6 MG/DL (ref 8.6–10.5)
CHLORIDE SERPL-SCNC: 102 MMOL/L (ref 98–107)
CHLORIDE SERPL-SCNC: 105 MMOL/L (ref 98–107)
CHLORIDE SERPL-SCNC: 98 MMOL/L (ref 98–107)
CO2 SERPL-SCNC: 20.8 MMOL/L (ref 22–29)
CO2 SERPL-SCNC: 21.1 MMOL/L (ref 22–29)
CO2 SERPL-SCNC: 23 MMOL/L (ref 22–29)
CREAT BLD-MCNC: 0.69 MG/DL (ref 0.57–1)
CREAT BLD-MCNC: 0.7 MG/DL (ref 0.57–1)
CREAT BLD-MCNC: 0.76 MG/DL (ref 0.57–1)
CRYPTOSP AG STL QL: NEGATIVE
FATTY ACIDS: NORMAL
G LAMBLIA AG STL QL IA: NEGATIVE
GFR SERPL CREATININE-BSD FRML MDRD: 79 ML/MIN/1.73
GFR SERPL CREATININE-BSD FRML MDRD: 87 ML/MIN/1.73
GFR SERPL CREATININE-BSD FRML MDRD: 88 ML/MIN/1.73
GLUCOSE BLD-MCNC: 185 MG/DL (ref 65–99)
GLUCOSE BLD-MCNC: 287 MG/DL (ref 65–99)
GLUCOSE BLD-MCNC: 443 MG/DL (ref 65–99)
GLUCOSE BLDC GLUCOMTR-MCNC: 179 MG/DL (ref 70–130)
GLUCOSE BLDC GLUCOMTR-MCNC: 198 MG/DL (ref 70–130)
GLUCOSE BLDC GLUCOMTR-MCNC: 200 MG/DL (ref 70–130)
GLUCOSE BLDC GLUCOMTR-MCNC: 216 MG/DL (ref 70–130)
GLUCOSE BLDC GLUCOMTR-MCNC: 241 MG/DL (ref 70–130)
GLUCOSE BLDC GLUCOMTR-MCNC: 247 MG/DL (ref 70–130)
GLUCOSE BLDC GLUCOMTR-MCNC: 259 MG/DL (ref 70–130)
GLUCOSE BLDC GLUCOMTR-MCNC: 272 MG/DL (ref 70–130)
GLUCOSE BLDC GLUCOMTR-MCNC: 300 MG/DL (ref 70–130)
GLUCOSE BLDC GLUCOMTR-MCNC: 323 MG/DL (ref 70–130)
GLUCOSE BLDC GLUCOMTR-MCNC: 335 MG/DL (ref 70–130)
GLUCOSE BLDC GLUCOMTR-MCNC: 365 MG/DL (ref 70–130)
GLUCOSE BLDC GLUCOMTR-MCNC: 377 MG/DL (ref 70–130)
GLUCOSE BLDC GLUCOMTR-MCNC: 441 MG/DL (ref 70–130)
LACTOFERRIN STL QL LA: NEGATIVE
MAGNESIUM SERPL-MCNC: 1.7 MG/DL (ref 1.6–2.6)
MAGNESIUM SERPL-MCNC: 1.7 MG/DL (ref 1.6–2.6)
MAGNESIUM SERPL-MCNC: 1.8 MG/DL (ref 1.6–2.6)
NEUTRAL FATS: NORMAL
PHOSPHATE SERPL-MCNC: 1.3 MG/DL (ref 2.5–4.5)
PHOSPHATE SERPL-MCNC: 1.4 MG/DL (ref 2.5–4.5)
PHOSPHATE SERPL-MCNC: 1.7 MG/DL (ref 2.5–4.5)
POTASSIUM BLD-SCNC: 3.7 MMOL/L (ref 3.5–5.2)
POTASSIUM BLD-SCNC: 3.8 MMOL/L (ref 3.5–5.2)
POTASSIUM BLD-SCNC: 3.9 MMOL/L (ref 3.5–5.2)
POTASSIUM BLD-SCNC: 4.2 MMOL/L (ref 3.5–5.2)
SODIUM BLD-SCNC: 135 MMOL/L (ref 136–145)
SODIUM BLD-SCNC: 137 MMOL/L (ref 136–145)
SODIUM BLD-SCNC: 138 MMOL/L (ref 136–145)

## 2017-12-17 PROCEDURE — 25010000002 PROMETHAZINE PER 50 MG: Performed by: INTERNAL MEDICINE

## 2017-12-17 PROCEDURE — 25010000002 ENOXAPARIN PER 10 MG: Performed by: INTERNAL MEDICINE

## 2017-12-17 PROCEDURE — 74176 CT ABD & PELVIS W/O CONTRAST: CPT

## 2017-12-17 PROCEDURE — 82043 UR ALBUMIN QUANTITATIVE: CPT | Performed by: INTERNAL MEDICINE

## 2017-12-17 PROCEDURE — 25810000003 POTASSIUM CHLORIDE PER 2 MEQ: Performed by: INTERNAL MEDICINE

## 2017-12-17 PROCEDURE — 83735 ASSAY OF MAGNESIUM: CPT | Performed by: PHYSICIAN ASSISTANT

## 2017-12-17 PROCEDURE — 63710000001 INSULIN DETEMER PER 5 UNITS: Performed by: INTERNAL MEDICINE

## 2017-12-17 PROCEDURE — 80048 BASIC METABOLIC PNL TOTAL CA: CPT | Performed by: PHYSICIAN ASSISTANT

## 2017-12-17 PROCEDURE — 99254 IP/OBS CNSLTJ NEW/EST MOD 60: CPT | Performed by: INTERNAL MEDICINE

## 2017-12-17 PROCEDURE — 82962 GLUCOSE BLOOD TEST: CPT

## 2017-12-17 PROCEDURE — 99253 IP/OBS CNSLTJ NEW/EST LOW 45: CPT | Performed by: INTERNAL MEDICINE

## 2017-12-17 PROCEDURE — 25010000002 ONDANSETRON PER 1 MG: Performed by: INTERNAL MEDICINE

## 2017-12-17 PROCEDURE — 25010000002 MORPHINE PER 10 MG: Performed by: INTERNAL MEDICINE

## 2017-12-17 PROCEDURE — 25010000002 MORPHINE SULFATE (PF) 2 MG/ML SOLUTION: Performed by: INTERNAL MEDICINE

## 2017-12-17 PROCEDURE — 63710000001 INSULIN REGULAR HUMAN PER 5 UNITS: Performed by: INTERNAL MEDICINE

## 2017-12-17 PROCEDURE — 82570 ASSAY OF URINE CREATININE: CPT | Performed by: INTERNAL MEDICINE

## 2017-12-17 PROCEDURE — 84132 ASSAY OF SERUM POTASSIUM: CPT | Performed by: INTERNAL MEDICINE

## 2017-12-17 PROCEDURE — 82330 ASSAY OF CALCIUM: CPT | Performed by: INTERNAL MEDICINE

## 2017-12-17 PROCEDURE — 25010000002 FLUCONAZOLE PER 200 MG: Performed by: INTERNAL MEDICINE

## 2017-12-17 PROCEDURE — 84100 ASSAY OF PHOSPHORUS: CPT | Performed by: PHYSICIAN ASSISTANT

## 2017-12-17 PROCEDURE — 63710000001 INSULIN ASPART PER 5 UNITS: Performed by: INTERNAL MEDICINE

## 2017-12-17 PROCEDURE — 93010 ELECTROCARDIOGRAM REPORT: CPT | Performed by: INTERNAL MEDICINE

## 2017-12-17 PROCEDURE — 0 DIATRIZOATE MEGLUMINE & SODIUM PER 1 ML: Performed by: INTERNAL MEDICINE

## 2017-12-17 RX ORDER — PANTOPRAZOLE SODIUM 40 MG/1
40 TABLET, DELAYED RELEASE ORAL
Status: DISCONTINUED | OUTPATIENT
Start: 2017-12-17 | End: 2017-12-20 | Stop reason: HOSPADM

## 2017-12-17 RX ORDER — DEXTROSE MONOHYDRATE 25 G/50ML
25 INJECTION, SOLUTION INTRAVENOUS
Status: DISCONTINUED | OUTPATIENT
Start: 2017-12-17 | End: 2017-12-19

## 2017-12-17 RX ORDER — MORPHINE SULFATE 2 MG/ML
2 INJECTION, SOLUTION INTRAMUSCULAR; INTRAVENOUS EVERY 4 HOURS PRN
Status: DISCONTINUED | OUTPATIENT
Start: 2017-12-17 | End: 2017-12-20 | Stop reason: HOSPADM

## 2017-12-17 RX ORDER — MORPHINE SULFATE 2 MG/ML
1 INJECTION, SOLUTION INTRAMUSCULAR; INTRAVENOUS EVERY 4 HOURS PRN
Status: DISCONTINUED | OUTPATIENT
Start: 2017-12-17 | End: 2017-12-20 | Stop reason: HOSPADM

## 2017-12-17 RX ORDER — PROMETHAZINE HYDROCHLORIDE 25 MG/ML
12.5 INJECTION, SOLUTION INTRAMUSCULAR; INTRAVENOUS EVERY 4 HOURS PRN
Status: DISCONTINUED | OUTPATIENT
Start: 2017-12-17 | End: 2017-12-20 | Stop reason: HOSPADM

## 2017-12-17 RX ORDER — NICOTINE POLACRILEX 4 MG
15 LOZENGE BUCCAL
Status: DISCONTINUED | OUTPATIENT
Start: 2017-12-17 | End: 2017-12-19

## 2017-12-17 RX ADMIN — FLUCONAZOLE 100 MG: 2 INJECTION INTRAVENOUS at 08:25

## 2017-12-17 RX ADMIN — INSULIN ASPART 4 UNITS: 100 INJECTION, SOLUTION INTRAVENOUS; SUBCUTANEOUS at 20:45

## 2017-12-17 RX ADMIN — POTASSIUM CHLORIDE AND SODIUM CHLORIDE 250 ML/HR: 450; 150 INJECTION, SOLUTION INTRAVENOUS at 11:55

## 2017-12-17 RX ADMIN — POTASSIUM CHLORIDE AND SODIUM CHLORIDE 250 ML/HR: 450; 150 INJECTION, SOLUTION INTRAVENOUS at 20:01

## 2017-12-17 RX ADMIN — DIATRIZOATE MEGLUMINE AND DIATRIZOATE SODIUM 30 ML: 600; 100 SOLUTION ORAL; RECTAL at 13:30

## 2017-12-17 RX ADMIN — POTASSIUM CHLORIDE 20 MEQ: 750 CAPSULE, EXTENDED RELEASE ORAL at 05:05

## 2017-12-17 RX ADMIN — POTASSIUM CHLORIDE AND SODIUM CHLORIDE 250 ML/HR: 450; 150 INJECTION, SOLUTION INTRAVENOUS at 07:15

## 2017-12-17 RX ADMIN — ONDANSETRON 4 MG: 2 INJECTION INTRAMUSCULAR; INTRAVENOUS at 10:59

## 2017-12-17 RX ADMIN — PANTOPRAZOLE SODIUM 40 MG: 40 TABLET, DELAYED RELEASE ORAL at 08:25

## 2017-12-17 RX ADMIN — ONDANSETRON 4 MG: 2 INJECTION INTRAMUSCULAR; INTRAVENOUS at 03:34

## 2017-12-17 RX ADMIN — POTASSIUM CHLORIDE 20 MEQ: 750 CAPSULE, EXTENDED RELEASE ORAL at 01:05

## 2017-12-17 RX ADMIN — INSULIN ASPART 8 UNITS: 100 INJECTION, SOLUTION INTRAVENOUS; SUBCUTANEOUS at 18:47

## 2017-12-17 RX ADMIN — MORPHINE SULFATE 2 MG: 2 INJECTION, SOLUTION INTRAMUSCULAR; INTRAVENOUS at 04:05

## 2017-12-17 RX ADMIN — PROMETHAZINE HYDROCHLORIDE 12.5 MG: 25 INJECTION INTRAMUSCULAR; INTRAVENOUS at 22:33

## 2017-12-17 RX ADMIN — POTASSIUM CHLORIDE AND SODIUM CHLORIDE 250 ML/HR: 450; 150 INJECTION, SOLUTION INTRAVENOUS at 23:54

## 2017-12-17 RX ADMIN — ENOXAPARIN SODIUM 40 MG: 40 INJECTION SUBCUTANEOUS at 20:57

## 2017-12-17 RX ADMIN — SODIUM CHLORIDE 3.8 UNITS/HR: 9 INJECTION, SOLUTION INTRAVENOUS at 04:03

## 2017-12-17 RX ADMIN — MORPHINE SULFATE 2 MG: 2 INJECTION, SOLUTION INTRAMUSCULAR; INTRAVENOUS at 22:33

## 2017-12-17 RX ADMIN — INSULIN DETEMIR 80 UNITS: 100 INJECTION, SOLUTION SUBCUTANEOUS at 14:05

## 2017-12-17 RX ADMIN — PROMETHAZINE HYDROCHLORIDE 12.5 MG: 25 INJECTION INTRAMUSCULAR; INTRAVENOUS at 13:30

## 2017-12-17 RX ADMIN — POTASSIUM CHLORIDE 20 MEQ: 750 CAPSULE, EXTENDED RELEASE ORAL at 06:57

## 2017-12-17 NOTE — PLAN OF CARE
Problem: Diabetes, Type 2 (Adult)  Goal: Signs and Symptoms of Listed Potential Problems Will be Absent or Manageable (Diabetes, Type 2)  Outcome: Ongoing (interventions implemented as appropriate)    Problem: Fall Risk (Adult)  Goal: Absence of Falls  Outcome: Ongoing (interventions implemented as appropriate)    Problem: Pain, Chronic (Adult)  Goal: Acceptable Pain Control/Comfort Level  Outcome: Ongoing (interventions implemented as appropriate)

## 2017-12-17 NOTE — H&P
Group: [facility]         CRITICAL CARE HISTORY AND PHYSICAL ADMISSION NOTE    Patient Identification:  Silvia Zabala  57 y.o.  female  1960  [id number]              CC: Nausea, vomiting.    History of Present Illness:  57-year-old female who presents with nausea and vomiting.  This started about 10 days ago.  It is still present.  She has a history of gastroparesis.  It's not alleviated by anything.  She has tried Reglan at home.  It is exacerbated by recent diagnoses of Candida esophagitis.  She underwent EGD which revealed Candida esophagitis.  She denies any fever or chills.  She's been vomiting and her blood sugars greater than 900.  The problem is still present.  She is now responding to some IV fluids and IV insulin in the ER.  I discussed the case directly with Dr. Montelongo.  I reviewed recent discharge summary dictated by Dr. Mayur Zavala on April 26, 2017.  The patient has a history of upper GI bleed, esophageal varices, portal hypertension, nonalcoholic steatohepatitis, type 2 diabetes, rheumatoid arthritis and cirrhosis of the liver.    Review of Systems   Constitutional: Positive for diaphoresis and fatigue. Negative for fever.   HENT: Negative for ear discharge and sore throat.    Eyes: Negative for pain and visual disturbance.   Respiratory: Positive for shortness of breath. Negative for cough.    Cardiovascular: Negative for chest pain and leg swelling.   Gastrointestinal: Positive for nausea and vomiting. Negative for abdominal pain and diarrhea.   Endocrine: Negative for cold intolerance and polyuria.   Genitourinary: Negative for dysuria and hematuria.   Musculoskeletal: Negative for joint swelling and myalgias.   Skin: Negative for rash and wound.   Neurological: Positive for weakness. Negative for speech difficulty and numbness.   Hematological: Negative for adenopathy. Does not bruise/bleed easily.   Psychiatric/Behavioral: Negative for agitation and confusion.       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   • Cirrhosis    • Colon polyps    • Deafness    • Depression    • Diabetes mellitus     Adult onset, insulin requiring   • Duodenal ulcer with hemorrhage    • Esophageal varices determined by endoscopy    • Fibromyalgia    • Gallbladder disease    • Gastroparesis    • Glaucoma    • Granuloma annulare    • H/O B12 deficiency    • H/O Bleeding ulcer     And gastroesophageal varices   • H/O mixed connective tissue disorder    • Hemorrhoids    • Hiatal hernia    • History of transfusion    • Hx of being hospitalized     In [place] for GI bleeding from ulcer and varices   • Hyperlipidemia    • Migraine    • BRICE (obstructive sleep apnea)    • Pancreas divisum    • Pancytopenia     Chronic, due to cirrhosis and hypersplenism   • Peptic ulcer disease     And esophageal varices   • PONV (postoperative nausea and vomiting)    • RA (rheumatoid arthritis)    • Seizure disorder     LAST ONE SEVERAL YEARS AGO   • Seizures    • Systemic lupus        Past Surgical History:  Past Surgical History:   Procedure Laterality Date   • BLADDER REPAIR  1991   • CATARACT EXTRACTION     • CHOLECYSTECTOMY  1994   • ENDOSCOPY N/A 11/7/2016    Procedure: ESOPHAGOGASTRODUODENOSCOPY WITH COLD POLYPECTOMY, BANDING,  AND CLIPS TIMES 2;  Surgeon: Rich Coleman MD;  Location: [facility] ENDOSCOPY;  Service:    • ENDOSCOPY N/A 12/22/2016    Procedure: ESOPHAGOGASTRODUODENOSCOPY WITH ESOPHAGEAL BANDING. 5 BANDS FIRED, 4 BANDS ADHERERD TO MUCOSA;  Surgeon: Rich Coleman MD;  Location: [facility] ENDOSCOPY;  Service:    • ENDOSCOPY N/A 2/15/2017    Procedure: ESOPHAGOGASTRODUODENOSCOPY AT BEDSIDE with esophageal varices banding (3);  Surgeon: Rich Coleman MD;  Location: [facility] ENDOSCOPY;  Service:    •  "ENDOSCOPY N/A 4/6/2017    Procedure: ESOPHAGOGASTRODUODENOSCOPY WITH ESOPHAGEAL VARICES BANDING x2;  Surgeon: Rich Coleman MD;  Location: [facility] ENDOSCOPY;  Service:    • ENDOSCOPY N/A 11/24/2017    Procedure: ESOPHAGOGASTRODUODENOSCOPY with biopsies;  Surgeon: Rich Coleman MD;  Location: [facility] ENDOSCOPY;  Service:    • ENDOSCOPY AND COLONOSCOPY  2014    Dr. Rich Coleman with findings of candida esophagitis   • EYE SURGERY     • HYSTERECTOMY  1986    partial   • JOINT REPLACEMENT     • KNEE SURGERY Right 1995    \"right knee recontstruction\"   • LIVER BIOPSY  01/2015   • MASS EXCISION     • STOMACH SURGERY          Home Meds:  Reviewed and reconciled    Allergies:  Allergies   Allergen Reactions   • Albuterol Anaphylaxis   • Tramadol Other (See Comments)     Per pt causes her \"palsy, meaning shaking and tremors\" and gi upset, nausea   • Quinine Derivatives Nausea And Vomiting       Social History:   Social History     Social History   • Marital status:      Spouse name: Jose   • Number of children: N/A   • Years of education: N/A     Occupational History   •  Disabled     Social History Main Topics   • Smoking status: Former Smoker     Packs/day: 0.00     Years: 0.00     Quit date: 12/17/2015   • Smokeless tobacco: Never Used   • Alcohol use No   • Drug use: No   • Sexual activity: Defer     Other Topics Concern   • Not on file     Social History Narrative    Moved to [place] from [place]. She is currently medically disabled.       Family History:  Family History   Problem Relation Age of Onset   • Liver disease Other    • Depression Other    • Heart disease Other    • Hypertension Other    • Diabetes Other    • Breast cancer Other    • Brain cancer Other    • Uterine cancer Other    • Colon cancer Other    • Leukemia Other    • Colon cancer Maternal Aunt    • Hypertension Mother    • Diabetes type II Mother    • Rheum arthritis Mother    • Liver disease Mother      DUKES   • Heart disease " "Father    • Diabetes type II Father    • Diabetes type II Sister    • Lupus Sister    • Malig Hyperthermia Neg Hx        Physical Exam:  /89 (BP Location: Right arm, Patient Position: Lying)  Pulse 110  Temp 98.1 °F (36.7 °C) (Tympanic)   Resp 18  Ht 167.6 cm (66\")  Wt 76.2 kg (168 lb)  SpO2 100%  BMI 27.12 kg/m2 Body mass index is 27.12 kg/(m^2). 100% 76.2 kg (168 lb)  Physical Exam   Constitutional:   Appears acutely ill.  Alert and oriented ×4, somewhat diaphoretic.  Has a ketotic breath.  Very dry mucosal membranes   HENT:   Head: Normocephalic.   Mouth/Throat: Oropharynx is clear and moist.   Eyes: Conjunctivae and EOM are normal. No scleral icterus.   Neck: No tracheal deviation present. No thyromegaly present.   Cardiovascular: Normal rate, regular rhythm and normal heart sounds.    Pulmonary/Chest: No respiratory distress. She has no wheezes. She exhibits no tenderness.   Abdominal: She exhibits no distension and no mass. There is no tenderness.   Musculoskeletal: Normal range of motion. She exhibits no deformity.   Neurological: She is alert. No cranial nerve deficit. She exhibits normal muscle tone.   Skin: Skin is warm. No rash noted. She is diaphoretic. No erythema.   Psychiatric: She has a normal mood and affect. Thought content normal.       LABS:  Lab Results   Component Value Date    CALCIUM 8.5 (L) 12/16/2017    PHOS 1.6 (L) 12/16/2017     Results from last 7 days  Lab Units 12/16/17  1839 12/16/17  1650   MAGNESIUM mg/dL 1.9  --    SODIUM mmol/L 126* 119*   POTASSIUM mmol/L 4.1 6.0*   CHLORIDE mmol/L 85* 78*   CO2 mmol/L 18.2* 18.4*   BUN mg/dL 16 16   CREATININE mg/dL 1.11* 1.19*   GLUCOSE mg/dL 842* 981*   CALCIUM mg/dL 8.5* 9.2   WBC 10*3/mm3  --  5.63   HEMOGLOBIN g/dL  --  14.0   PLATELETS 10*3/mm3  --  124*   ALT (SGPT) U/L  --  34*   AST (SGOT) U/L  --  26     Lab Results   Component Value Date    CKTOTAL 83 02/16/2017    CKMB 1.34 02/16/2017    TROPONINT <0.010 02/16/2017       "             Results from last 7 days  Lab Units 12/16/17  1809   PH, ARTERIAL pH units 7.454*   PCO2, ARTERIAL mm Hg 24.2*   PO2 ART mm Hg 76.9*   MODALITY  Room Air   O2 SATURATION CALC % 96.2                 Lab Results   Component Value Date    TSH 3.980 03/07/2017     Estimated Creatinine Clearance: 59.8 mL/min (by C-G formula based on Cr of 1.11).    Results from last 7 days  Lab Units 12/16/17  1823   NITRITE UA  Negative        Imaging: I personally visualized the images of scans/x-rays performed within last 30 days.      Assessment:  Diabetic ketoacidosis  Candida esophagitis causing nausea vomiting  Hypophosphatemia  Acute kidney injury  Hyponatremia  Nonalcoholic steatohepatitis  Portal hypertension        Recommendations:  Admit to the intensive care unit  Start IV insulin drip with DKA protocol.  Correct potassium and phosphorus.  Check magnesium level and correct if necessary.  Start IV Diflucan for Candida esophagitis.  Consult endocrinology.  Consult gastroenterology.    Total critical care time 35 minutes, excluding any separately billable procedure time          Juan Martinez MD  12/16/2017  7:26 PM      Much of this encounter note is an electronic transcription/translation of spoken language to printed text using Dragon Software.

## 2017-12-17 NOTE — CONSULTS
[facility]  Initial Inpatient Consult Note    Referring Provider: Dr. Martinez    Reason for Consultation: Esophagitis    Subjective     History of present illness:    57 y.o. female who is well known to Dr. Rob Coleman with a h/o diabetic gastroparesis (is being seen by Dr. Hope with the  [facility]), and cirrhosis from DUKES (she is being seen by Dr. Mont[facility]).  The patient was admitted 2017 with a 10 day history of nausea, nonbloody vomiting, and nonbloody diarrhea.  She was found to be in diabetic ketoacidosis and is now in the coronary care unit on an insulin drip.  The patient was trying to get through until her scheduled office visit with Dr. Rob Coleman on Monday but she could not make it until then.  She has some bright red blood per rectum.  She last had an EGD by Dr. Rob Coleman on 2017.  This was done because of epigastric pain and history of esophageal varices.  He found Candida esophagitis but no evidence of esophageal or gastric varices.  The patient was put on clotrimazole lozenges for 2 weeks.  Patient states she still has some odynophagia although it is better.  Her weight has been stable.  The patient states her last colonoscopy was about 6 years ago.  She denies nonsteroidal anti-inflammatory drug use and denies alcohol use.  She last had an antibiotic about a month ago when she was put on erythromycin in an attempt to get her off of Reglan.  Erythromycin did not work.  Dr. Montes wants her off of Reglan because of tardive dyskinesia of the mouth.  The patient states that the  of  [facility] thinks that she is too early to be placed on liver transplant list.  Dr. Hope is trying to get the patient on domperidone from [facility].  Patient has no leg swelling.  She states she has minimal abdominal swelling.  Her mother  of nonalcoholic cirrhosis.  The patient does have a history of upper GI bleeding from esophageal  varices in the past.  Patient's and nonsmoker.  She lives with her .  Patient has 3 children.    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   • Cirrhosis    • Colon polyps    • Deafness    • Depression    • Diabetes mellitus     Adult onset, insulin requiring   • Duodenal ulcer with hemorrhage    • Esophageal varices determined by endoscopy    • Fibromyalgia    • Gallbladder disease    • Gastroparesis    • Glaucoma    • Granuloma annulare    • H/O B12 deficiency    • H/O Bleeding ulcer     And gastroesophageal varices   • H/O mixed connective tissue disorder    • Hemorrhoids    • Hiatal hernia    • History of transfusion    • Hx of being hospitalized     In [place] for GI bleeding from ulcer and varices   • Hyperlipidemia    • Migraine    • BRICE (obstructive sleep apnea)    • Pancreas divisum    • Pancytopenia     Chronic, due to cirrhosis and hypersplenism   • Peptic ulcer disease     And esophageal varices   • PONV (postoperative nausea and vomiting)    • RA (rheumatoid arthritis)    • Seizure disorder     LAST ONE SEVERAL YEARS AGO   • Seizures    • Systemic lupus      Past Surgical History:  Past Surgical History:   Procedure Laterality Date   • BLADDER REPAIR  1991   • CATARACT EXTRACTION     • CHOLECYSTECTOMY  1994   • ENDOSCOPY N/A 11/7/2016    Procedure: ESOPHAGOGASTRODUODENOSCOPY WITH COLD POLYPECTOMY, BANDING,  AND CLIPS TIMES 2;  Surgeon: Rich Coleman MD;  Location: [facility] ENDOSCOPY;  Service:    • ENDOSCOPY N/A 12/22/2016    Procedure: ESOPHAGOGASTRODUODENOSCOPY WITH ESOPHAGEAL BANDING. 5 BANDS FIRED, 4 BANDS ADHERERD TO MUCOSA;  Surgeon: Rich Coleman MD;  Location: [facility] ENDOSCOPY;  Service:    • ENDOSCOPY N/A 2/15/2017    Procedure: ESOPHAGOGASTRODUODENOSCOPY AT BEDSIDE with  "esophageal varices banding (3);  Surgeon: Rich Coleman MD;  Location: [facility] ENDOSCOPY;  Service:    • ENDOSCOPY N/A 4/6/2017    Procedure: ESOPHAGOGASTRODUODENOSCOPY WITH ESOPHAGEAL VARICES BANDING x2;  Surgeon: Rich Coleman MD;  Location: [facility] ENDOSCOPY;  Service:    • ENDOSCOPY N/A 11/24/2017    Procedure: ESOPHAGOGASTRODUODENOSCOPY with biopsies;  Surgeon: Rich Coleman MD;  Location: [facility] ENDOSCOPY;  Service:    • ENDOSCOPY AND COLONOSCOPY  2014    Dr. Rich Coleman with findings of candida esophagitis   • EYE SURGERY     • HYSTERECTOMY  1986    partial   • JOINT REPLACEMENT     • KNEE SURGERY Right 1995    \"right knee recontstruction\"   • LIVER BIOPSY  01/2015   • MASS EXCISION     • STOMACH SURGERY        Social History:   Social History   Substance Use Topics   • Smoking status: Former Smoker     Packs/day: 0.00     Years: 0.00     Quit date: 12/17/2015   • Smokeless tobacco: Never Used   • Alcohol use No      Family History:  Family History   Problem Relation Age of Onset   • Liver disease Other    • Depression Other    • Heart disease Other    • Hypertension Other    • Diabetes Other    • Breast cancer Other    • Brain cancer Other    • Uterine cancer Other    • Colon cancer Other    • Leukemia Other    • Colon cancer Maternal Aunt    • Hypertension Mother    • Diabetes type II Mother    • Rheum arthritis Mother    • Liver disease Mother      DUKES   • Heart disease Father    • Diabetes type II Father    • Diabetes type II Sister    • Lupus Sister    • Malig Hyperthermia Neg Hx        Home Meds:  Prescriptions Prior to Admission   Medication Sig Dispense Refill Last Dose   • ALPRAZolam (XANAX) 0.5 MG tablet Take 1 tablet by mouth 2 (Two) Times a Day As Needed for Anxiety. 60 tablet 0 12/15/2017 at Unknown time   • buPROPion XL (WELLBUTRIN XL) 150 MG 24 hr tablet Take 1 tablet by mouth Daily. 90 tablet 1 12/15/2017 at Unknown time   • cholecalciferol (VITAMIN D3) 1000 UNITS tablet Take 1,000 " Units by mouth Daily.   12/15/2017 at Unknown time   • clotrimazole (MYCELEX) 10 MG omer Take 1 tablet by mouth 5 (Five) Times a Day. 70 tablet 1 12/15/2017 at Unknown time   • cyclobenzaprine (FLEXERIL) 5 MG tablet Take 5 mg by mouth 3 (Three) Times a Day As Needed for Muscle Spasms.   12/15/2017 at Unknown time   • cycloSPORINE (RESTASIS) 0.05 % ophthalmic emulsion 1 drop 2 (two) times a day.   12/15/2017 at Unknown time   • diphenhydrAMINE (BENADRYL) 25 mg capsule Take 25 mg by mouth Every 6 (Six) Hours As Needed.   12/15/2017 at Unknown time   • docusate sodium (COLACE) 100 MG capsule Take 100 mg by mouth as needed for constipation.   12/15/2017 at Unknown time   • DULoxetine (CYMBALTA) 30 MG capsule Take 90 mg by mouth Daily.   12/15/2017 at Unknown time   • Etanercept 50 MG/ML solution auto-injector Inject  under the skin 1 (One) Time Per Week. On sundays   12/15/2017 at Unknown time   • ferrous sulfate 325 (65 FE) MG tablet Take 325 mg by mouth 2 (Two) Times a Day.   12/15/2017 at Unknown time   • folic acid (FOLVITE) 1 MG tablet Take 1 mg by mouth Daily.   12/15/2017 at Unknown time   • glucose blood (ONE TOUCH ULTRA TEST) test strip Test blood sugar 4 times daily 200 each 5 12/15/2017 at Unknown time   • insulin aspart (novoLOG) 100 UNIT/ML injection Inject 40 Units under the skin 3 (Three) Times a Day Before Meals.      • insulin aspart (novoLOG) 100 UNIT/ML injection Inject 25 Units under the skin. With snacks      • insulin glargine (LANTUS) 100 UNIT/ML injection Inject 80 Units under the skin Every Night.      • Insulin Pen Needle 31G X 5 MM misc To inject 5 -6 times per day 100 each 11 12/15/2017 at Unknown time   • levETIRAcetam (KEPPRA) 500 MG tablet Take 500 mg by mouth 2 (Two) Times a Day.   12/15/2017 at Unknown time   • Melatonin 10 MG tablet Take 1 tablet by mouth Every Night.   12/15/2017 at Unknown time   • metFORMIN (GLUCOPHAGE) 1000 MG tablet Take 1,000 mg by mouth 2 (Two) Times a Day With  "Meals.      • metoclopramide (REGLAN) 10 MG tablet Take 10 mg by mouth 4 (Four) Times a Day Before Meals & at Bedtime.   12/15/2017 at Unknown time   • Multiple Vitamin (MULTI-VITAMIN PO) Take 1 tablet by mouth Daily.   12/15/2017 at Unknown time   • OxyCODONE HCl (OXAYDO) 7.5 MG tablet  Take 7.5 mg by mouth Every 8 (Eight) Hours As Needed.   12/15/2017 at Unknown time   • pantoprazole (PROTONIX) 40 MG EC tablet Take 40 mg by mouth daily.   12/15/2017 at Unknown time   • pravastatin (PRAVACHOL) 40 MG tablet Take 1 tablet by mouth Daily. 90 tablet 1 12/15/2017 at Unknown time   • pregabalin (LYRICA) 75 MG capsule Take 1 capsule by mouth 2 (Two) Times a Day. 60 capsule 2 12/15/2017 at Unknown time   • promethazine (PHENERGAN) 25 MG tablet Take 1 tablet by mouth Every 6 (Six) Hours As Needed for Nausea or Vomiting. 30 tablet 2 12/15/2017 at Unknown time   • rifaximin (XIFAXAN) 550 MG tablet Take 550 mg by mouth Every 12 (Twelve) Hours.   12/15/2017 at Unknown time   • spironolactone (ALDACTONE) 100 MG tablet Take 1 tablet by mouth Daily. 30 tablet 0 12/15/2017 at Unknown time   • sucralfate (CARAFATE) 1 G tablet Take 1 tablet by mouth 2 (two) times a day.   12/15/2017 at Unknown time   • vitamin B-12 (CYANOCOBALAMIN) 1000 MCG tablet Take 1,000 mcg by mouth daily.   12/15/2017 at Unknown time     Current Meds:     enoxaparin 30 mg Subcutaneous Q24H   fluconazole 100 mg Intravenous Daily   insulin regular 0.1 Units/kg Intravenous Once   pantoprazole 40 mg Oral Q AM   sodium chloride 1,000 mL Intravenous Once     Allergies:  Allergies   Allergen Reactions   • Albuterol Anaphylaxis   • Tramadol Other (See Comments)     Per pt causes her \"palsy, meaning shaking and tremors\" and gi upset, nausea   • Quinine Derivatives Nausea And Vomiting     Review of Systems  The following systems were reviewed and negative;  respiratory, cardiovascular, musculoskeletal and neurological     Objective     Vital Signs  Temp:  [97.8 °F (36.6 " °C)-99.5 °F (37.5 °C)] 98.6 °F (37 °C)  Heart Rate:  [] 93  Resp:  [18-20] 18  BP: (128-162)/() 136/87  Physical Exam:  General Appearance:    Alert, cooperative, in no acute distress   Head:    Normocephalic, without obvious abnormality, atraumatic   Eyes:            Lids and lashes normal, conjunctivae and sclerae normal, no   icterus   Throat:   No oral lesions, no thrush, oral mucosa moist   Neck:   No adenopathy, supple, trachea midline, no thyromegaly, no   carotid bruit, no JVD   Lungs:     Clear to auscultation,respirations regular, even and                   unlabored    Heart:    Regular rhythm and normal rate, normal S1 and S2, no            murmur, no gallop, no rub, no click   Chest Wall:    No abnormalities observed   Abdomen:     Normal bowel sounds, no masses, no organomegaly, soft        non-tender, non-distended, no guarding, no rebound                 Tenderness. No fluid wave.   Rectal:     Deferred   Extremities:   no edema, no cyanosis, no redness   Skin:   No bleeding, bruising or rash   Lymph nodes:   No palpable adenopathy   Psychiatric:  Judgement and insight: normal   Orientation to person place and time: normal   Mood and affect: normal   Results Review:   I reviewed the patient's new clinical results.      Results from last 7 days  Lab Units 12/16/17  1650   WBC 10*3/mm3 5.63   HEMOGLOBIN g/dL 14.0   HEMATOCRIT % 42.2   PLATELETS 10*3/mm3 124*       Results from last 7 days  Lab Units 12/17/17  0606 12/17/17  0401 12/17/17  0011 12/16/17  2036 12/16/17  1839 12/16/17  1650   SODIUM mmol/L  --  137 135*  --  126* 119*   POTASSIUM mmol/L 3.9 3.8 3.7  --  4.1 6.0*   CHLORIDE mmol/L  --  102 98  --  85* 78*   CO2 mmol/L  --  21.1* 23.0  --  18.2* 18.4*   BUN mg/dL  --  9 11  --  16 16   CREATININE mg/dL  --  0.69 0.76  --  1.11* 1.19*   CALCIUM mg/dL  --  8.2* 8.6  --  8.5* 9.2   BILIRUBIN mg/dL  --   --   --   --   --  1.2   ALK PHOS U/L  --   --   --   --   --  174*   ALT  (SGPT) U/L  --   --   --   --   --  34*   AST (SGOT) U/L  --   --   --   --   --  26   GLUCOSE mg/dL  --  287* 443* 717* 842* 981*           Lab Results  Lab Value Date/Time   LIPASE 22 12/16/2017 1650   LIPASE 10 (L) 10/30/2017 1732   LIPASE 9 (L) 02/21/2017 1202   LIPASE 16 11/10/2014 0724       Radiology:  No orders to display       Assessment/Plan   Patient Active Problem List   Diagnosis   • Cirrhosis of liver   • Rheumatoid arthritis   • Anxiety and depression   • Type 2 diabetes mellitus, uncontrolled, with neuropathy   • Fibrositis   • Change in blood platelet count   • Pancytopenia   • Hypersplenism   • Nausea   • WONG (nonalcoholic steatohepatitis)   • Hyperlipidemia   • UGI bleed   • Ascites   • Portal hypertension   • Systemic lupus   • Secondary esophageal varices without bleeding   • Secondary esophageal varices with bleeding   • Upper GI bleed   • Gastroparesis   • Cirrhosis of liver without ascites   • Diabetic ketoacidosis without coma associated with type 2 diabetes mellitus       I discussed the patients findings and my recommendations with patient and nursing staff.    Impression:  1.  This 57-year-old white female has nausea, vomiting and diarrhea and is in diabetic ketoacidosis.  Patient recently had an EGD on 11/24/2017 by Dr. Rob Coleman.  The EGD showed Candida esophagitis and no evidence of esophageal or gastric varices.  The patient does still have some odynophagia.  2.  Patient has a history of diabetic gastroparesis.  She does have a hard abdomen dyskinesia so should not be put on Reglan.  Dr. Hope from the [place] [facility] is trying to get the patient on domperidone from [facility].  3.  The patient has a history of nonalcoholic cirrhosis felt to be from Wong.  She is being evaluated by Dr. Bolivar [facility] transplant [facility] but he feels that she is too early at this time for a liver transplantation.  4.  She does have some rectal bleeding.  She last had a colonoscopy 6  years ago.    Recommendations:  1.  I agree with continuing her on fluconazole to help to treat the Candida esophagitis.  2.  I will put her on a proton pump inhibitor.  3.  I agree with checking stool studies to evaluate her diarrhea.  4.  Dr. Rob Coleman will see her starting Monday.  5.  I would consider a colonoscopy given her rectal bleeding and the fact that she has not had a colonoscopy in 6 years.  I will defer this to Dr. Rob Coleman.    Endy Vasquez MD

## 2017-12-17 NOTE — CONSULTS
REASON FOR CONSULTATION: Treatment of diabetes mellitus.    HISTORY OF PRESENT ILLNESS: Patient is a 57-year-old female who is a patient of Dr. Burnett. She was admitted to the hospital 12/16/2017 because of intermittent nausea and vomiting over the past month despite taking Reglan 10 mg q.i.d. She started having diarrhea 4 days before this admission. She denies any associated fever or chills. She came to the emergency room and was found to be in ketoacidosis. She was started on IV fluids and insulin drip. The acidosis has cleared up and she has been started on Levemir 80 units once a day.    Patient has known diabetes mellitus since 1998 and started on insulin in 2009. She has been on Lantus 80 units at bedtime and NovoLog 40 units with each meal. Her last Lantus dose was on the night before admission. The hemoglobin A1c on admission was markedly elevated at 8.20%.    Her last eye exam was in 07/2017 and there is no retinopathy. She has numbness and tingling of her feet and has been on Lyrica 75 mg b.i.d. She has no microalbuminuria on urine sample taken in 2016. She has known gastroparesis and will be switching from Reglan to domperidone when it becomes available to her. She has intermittent hypoglycemia every 2 weeks.    PAST MEDICAL HISTORY:  1.  History of anemia.  2.  Pancreas divisum.  3.  Cirrhosis due to nonalcoholic fatty liver disease.  4.  Depression.  5.  Chronic gastritis.  6.  Candida esophagitis.  7.  Glaucoma.  8.  Rheumatoid arthritis and systemic lupus. She is on Enbrel.   9.  Sleep apnea but is not able to tolerate a CPAP.  10.  Hyperlipidemia and has been on pravastatin 40 mg once a day.  11.  Hemorrhoids on previous colonoscopy.  12.  History of seizure disorder, not related to hyperglycemia, and has been on Keppra.    PAST SURGICAL HISTORY:  1.  Bladder repair.  2.  Cataract extraction.  3.  Tonsillectomy.  4.  Last upper endoscopy was in 11/2017 which showed mild chronic active  gastritis.  5.  Colonoscopy.  6.  Right knee replacement.  7.  Liver biopsy.    ALLERGIES:  1.  ALBUTEROL.  2.  TRAMADOL.  3.  QUININE.    SOCIAL HISTORY: Patient is . She smoked for 7 years and has quit smoking in 2016. She denies alcohol or drug abuse.    FAMILY HISTORY: Her maternal aunt had colon cancer. Her mother has high blood pressure, type 2 diabetes mellitus, rheumatoid arthritis, nonalcoholic fatty liver disease. Her father has heart disease and type 2 diabetes mellitus. Her sister has type 2 diabetes mellitus. Another sister has lupus.    REVIEW OF SYSTEMS: She denies any significant weight change. She is no longer nauseated. She is still having some diarrhea. She has intermittent hemorrhoidal bleeding. She denies hematochezia or melena. She denies hematemesis. She denies seizures or loss of consciousness. She denies dysuria or polyuria. She denies chest pain or palpitations.    PHYSICAL EXAMINATION:  VITAL SIGNS: Blood pressure is 145/82, heart rate 110, temperature 98.3 degrees Fahrenheit.  HEENT:  Pink conjunctivae. Sclerae anicteric. No xanthelasma. Full extraocular muscle movement. No facial asymmetry. Speech is fluent. No pharyngeal congestion or oral thrush.  NECK: No carotid bruits.  Her thyroid is not enlarged. No cervical lymphadenopathy. Neck veins are not visible at 45 degrees.  LUNGS: Equal chest expansion. No rales, no wheezes.  HEART: Regular heart rate and rhythm. Tachycardic. No gallop, no murmur.  ABDOMEN: Soft, nontender. Bowel sounds are active.  EXTREMITIES: Warm. No cyanosis, pedal edema, or clubbing.  NEUROLOGIC: Light touch sensation is grossly intact. There are no xanthomas    IMPRESSION:   1.  Diabetic ketoacidosis, resolving.  2.  History of gastroparesis.  3.  History of Candida esophagitis.  4.  Hyperlipidemia.  5.  History of seizure disorder.    RECOMMENDATIONS:  1.  Patient already has received Levemir 80 units this afternoon. Continue with current dose for now.  2.   May advance diet to full liquid diet.  3.  Will defer treatment of gastroparesis to the GI specialist.  4.  Continue proton pump inhibitor.  5.  Will defer decision on starting back on Reglan to GI.  6.  Check lipid profile, MATTHEW antibody, and C-peptide levels in the morning.    Thank you for the referral. Dr Burnett will follow patient starting tomorrow.

## 2017-12-17 NOTE — PROGRESS NOTES
Clinical Pharmacy Services: Medication History    Silvia Zabala is a 57 y.o. female presenting to [facility] for Diabetic ketoacidosis without coma associated with type 2 diabetes mellitus [E13.10]    She  has a past medical history of Anemia; Anxiety; Arthritis; Chromosomal abnormality; Cirrhosis; Colon polyps; Deafness; Depression; Diabetes mellitus; Duodenal ulcer with hemorrhage; Esophageal varices determined by endoscopy; Fibromyalgia; Gallbladder disease; Gastroparesis; Glaucoma; Granuloma annulare; H/O B12 deficiency; H/O Bleeding ulcer; H/O mixed connective tissue disorder; Hemorrhoids; Hiatal hernia; History of transfusion; being hospitalized; Hyperlipidemia; Migraine; BRICE (obstructive sleep apnea); Pancreas divisum; Pancytopenia; Peptic ulcer disease; PONV (postoperative nausea and vomiting); RA (rheumatoid arthritis); Seizure disorder; Seizures; and Systemic lupus.    Allergies as of 12/16/2017 - Abdirizak as Reviewed 12/16/2017   Allergen Reaction Noted   • Albuterol Anaphylaxis 02/22/2016   • Tramadol Other (See Comments) 03/08/2016   • Quinine derivatives Nausea And Vomiting 02/22/2016       Medication information was obtained from: the patient  Pharmacy and Phone Number: [facility] [phone]    Prior to Admission Medications     Prescriptions Last Dose Informant Patient Reported? Taking?    ALPRAZolam (XANAX) 0.5 MG tablet   No Yes    Take 1 tablet by mouth 2 (Two) Times a Day As Needed for Anxiety.    buPROPion XL (WELLBUTRIN XL) 150 MG 24 hr tablet   No Yes    Take 1 tablet by mouth Daily.    cholecalciferol (VITAMIN D3) 1000 UNITS tablet   Yes Yes    Take 1,000 Units by mouth Daily.    clotrimazole (MYCELEX) 10 MG omer   No Yes    Take 1 tablet by mouth 5 (Five) Times a Day.    cyclobenzaprine (FLEXERIL) 5 MG tablet   Yes Yes    Take 5 mg by mouth 3 (Three) Times a Day As Needed for Muscle Spasms.    cycloSPORINE (RESTASIS) 0.05 % ophthalmic emulsion   Yes Yes    1 drop 2 (two) times a  day.    diphenhydrAMINE (BENADRYL) 25 mg capsule   Yes Yes    Take 25 mg by mouth Every 6 (Six) Hours As Needed.    docusate sodium (COLACE) 100 MG capsule   Yes Yes    Take 100 mg by mouth as needed for constipation.    DULoxetine (CYMBALTA) 30 MG capsule   Yes Yes    Take 90 mg by mouth Daily.    Etanercept 50 MG/ML solution auto-injector   Yes Yes    Inject  under the skin 1 (One) Time Per Week.    ferrous sulfate 325 (65 FE) MG tablet   Yes Yes    Take 325 mg by mouth 2 (Two) Times a Day.    folic acid (FOLVITE) 1 MG tablet   Yes Yes    Take 1 mg by mouth Daily.    glucose blood (ONE TOUCH ULTRA TEST) test strip   No Yes    Test blood sugar 4 times daily    Insulin Pen Needle 31G X 5 MM misc   No Yes    To inject 5 -6 times per day    LANTUS SOLOSTAR 100 UNIT/ML injection pen   No Yes    INJECT 80 UNITS UNDER THE SKIN DAILY    levETIRAcetam (KEPPRA) 500 MG tablet   Yes Yes    Take 500 mg by mouth 2 (Two) Times a Day.    Melatonin 10 MG tablet   Yes Yes    Take 1 tablet by mouth Every Night.    metFORMIN (GLUCOPHAGE) 1000 MG tablet   No Yes    Take 1 tablet by mouth 2 (Two) Times a Day With Meals.    Patient taking differently:  Take 1,000 mg by mouth 2 (Two) Times a Day.    metoclopramide (REGLAN) 10 MG tablet   Yes Yes    Take 10 mg by mouth 4 (Four) Times a Day Before Meals & at Bedtime.    Multiple Vitamin (MULTI-VITAMIN PO)   Yes Yes    Take 1 tablet by mouth Daily.    NOVOLOG FLEXPEN 100 UNIT/ML solution pen-injector sc pen   No Yes    INJECT 40 UNITS UNDER THE SKIN BEFORE EACH MEAL AND 25 units before SNACKS. PA APPROVED    OxyCODONE HCl (OXAYDO) 7.5 MG tablet    Yes Yes    Take 7.5 mg by mouth Every 8 (Eight) Hours As Needed.    pantoprazole (PROTONIX) 40 MG EC tablet   Yes Yes    Take 40 mg by mouth daily.    pravastatin (PRAVACHOL) 40 MG tablet   No Yes    Take 1 tablet by mouth Daily.    pregabalin (LYRICA) 75 MG capsule   No Yes    Take 1 capsule by mouth 2 (Two) Times a Day.    promethazine  (PHENERGAN) 25 MG tablet   No Yes    Take 1 tablet by mouth Every 6 (Six) Hours As Needed for Nausea or Vomiting.    rifaximin (XIFAXAN) 550 MG tablet   Yes Yes    Take 550 mg by mouth Every 12 (Twelve) Hours.    spironolactone (ALDACTONE) 100 MG tablet   No Yes    Take 1 tablet by mouth Daily.    sucralfate (CARAFATE) 1 G tablet   Yes Yes    Take 1 tablet by mouth 2 (two) times a day.    vitamin B-12 (CYANOCOBALAMIN) 1000 MCG tablet   Yes Yes    Take 1,000 mcg by mouth daily.            Medication notes: Unable to verify Rx with patients pharmacy, however, patient was good historian and reported adherence     This medication list is complete to the best of my knowledge as of 12/16/2017    Please call if you have any questions.    Christofer Stewart  Medication History Technician   [id number]  12/16/2017 7:20 PM

## 2017-12-17 NOTE — PROGRESS NOTES
Dr. ANATOLIY Martinez    [facility]    12/17/2017    Patient ID:  Name:  Silvia Zabala  MRN:  [id number]  1960  57 y.o.  female            CC/Reason for visit: Follow-up for DKA    HPI: Patient is feeling better today.  No nausea or vomiting since admission    Vitals:  Vitals:    12/17/17 0505 12/17/17 0605 12/17/17 0700 12/17/17 0705   BP: 138/78 128/87  136/87   BP Location:       Patient Position:       Pulse: 96 93  93   Resp:       Temp:   98.6 °F (37 °C)    TempSrc:   Oral    SpO2: 96% 97%  98%   Weight:       Height:               Body mass index is 26.52 kg/(m^2).    Intake/Output Summary (Last 24 hours) at 12/17/17 0921  Last data filed at 12/17/17 0607   Gross per 24 hour   Intake           2065.8 ml   Output             1650 ml   Net            415.8 ml       Exam:  GEN:  No distress, appears stated age  EYES:   EOM-i, anicteric sclera bilat  ENT:    External ears/nose normal, OP clear  NECK:  Supple, midline trachea  LUNGS: Clear breath sounds bilat, nonlabored breathing  CV:  Normal S1S2, without murmur, no edema  ABD:  Non tender, no enlarged liver or masses  EXT:  No cyanosis or clubbing    Scheduled meds:    enoxaparin 30 mg Subcutaneous Q24H   fluconazole 100 mg Intravenous Daily   insulin regular 0.1 Units/kg Intravenous Once   pantoprazole 40 mg Oral Q AM   sodium chloride 1,000 mL Intravenous Once     IV meds:                        dextrose 5 % and sodium chloride 0.45 % 150 mL/hr    dextrose 5 % and sodium chloride 0.45 % 150 mL/hr    dextrose 5 % and sodium chloride 0.45 % with KCl 20 mEq/L 150 mL/hr    dextrose 5 % and sodium chloride 0.45 % with KCl 20 mEq/L 150 mL/hr    insulin regular infusion 1 unit/mL 0.1 Units/kg/hr Last Rate: 4.8 Units/hr (12/17/17 0700)   sodium chloride 250 mL/hr    sodium chloride 0.45 % with KCl 20 mEq 250 mL/hr Last Rate: 250 mL/hr (12/17/17 0715)   sodium chloride 9 mL/hr Last Rate: 9 mL/hr (12/16/17 6345)       Data Review:   I  reviewed the patient's medications and new clinical results.      Results from last 7 days  Lab Units 12/17/17  0606 12/17/17  0401 12/17/17  0011 12/16/17  2036 12/16/17  1839 12/16/17  1650   SODIUM mmol/L  --  137 135*  --  126* 119*   POTASSIUM mmol/L 3.9 3.8 3.7  --  4.1 6.0*   CHLORIDE mmol/L  --  102 98  --  85* 78*   CO2 mmol/L  --  21.1* 23.0  --  18.2* 18.4*   BUN mg/dL  --  9 11  --  16 16   CREATININE mg/dL  --  0.69 0.76  --  1.11* 1.19*   CALCIUM mg/dL  --  8.2* 8.6  --  8.5* 9.2   BILIRUBIN mg/dL  --   --   --   --   --  1.2   ALK PHOS U/L  --   --   --   --   --  174*   ALT (SGPT) U/L  --   --   --   --   --  34*   AST (SGOT) U/L  --   --   --   --   --  26   GLUCOSE mg/dL  --  287* 443* 717* 842* 981*   WBC 10*3/mm3  --   --   --   --   --  5.63   HEMOGLOBIN g/dL  --   --   --   --   --  14.0   PLATELETS 10*3/mm3  --   --   --   --   --  124*             Results from last 7 days  Lab Units 12/16/17  1809   PH, ARTERIAL pH units 7.454*   PCO2, ARTERIAL mm Hg 24.2*   PO2 ART mm Hg 76.9*   MODALITY  Room Air   O2 SATURATION CALC % 96.2           ASSESSMENT:   Active Problems:    Diabetic ketoacidosis without coma associated with type 2 diabetes mellitus  Candida esophagitis causing nausea vomiting  Hypophosphatemia  Acute kidney injury  Hyponatremia  Nonalcoholic steatohepatitis  Portal hypertension      PLAN:  Continue DKA protocol.  Start feeding the patient today.  Start ambulating the patient today.  Transfer out of CCU later today if appropriate.        Juan Martinez MD  12/17/2017

## 2017-12-18 LAB
ALBUMIN UR-MCNC: <1.2 MG/L
ANION GAP SERPL CALCULATED.3IONS-SCNC: 11.9 MMOL/L
BUN BLD-MCNC: 3 MG/DL (ref 6–20)
BUN/CREAT SERPL: 4.2 (ref 7–25)
CALCIUM SPEC-SCNC: 7.9 MG/DL (ref 8.6–10.5)
CHLORIDE SERPL-SCNC: 106 MMOL/L (ref 98–107)
CHOLEST SERPL-MCNC: 129 MG/DL (ref 0–200)
CO2 SERPL-SCNC: 17.1 MMOL/L (ref 22–29)
CREAT BLD-MCNC: 0.71 MG/DL (ref 0.57–1)
CREAT UR-MCNC: 15 MG/DL
DEPRECATED RDW RBC AUTO: 49.7 FL (ref 37–54)
ERYTHROCYTE [DISTWIDTH] IN BLOOD BY AUTOMATED COUNT: 15.8 % (ref 11.7–13)
GFR SERPL CREATININE-BSD FRML MDRD: 85 ML/MIN/1.73
GLUCOSE BLD-MCNC: 327 MG/DL (ref 65–99)
GLUCOSE BLDC GLUCOMTR-MCNC: 111 MG/DL (ref 70–130)
GLUCOSE BLDC GLUCOMTR-MCNC: 116 MG/DL (ref 70–130)
GLUCOSE BLDC GLUCOMTR-MCNC: 218 MG/DL (ref 70–130)
GLUCOSE BLDC GLUCOMTR-MCNC: 321 MG/DL (ref 70–130)
GLUCOSE BLDC GLUCOMTR-MCNC: 329 MG/DL (ref 70–130)
HCT VFR BLD AUTO: 33.5 % (ref 35.6–45.5)
HDLC SERPL-MCNC: 22 MG/DL (ref 40–60)
HGB BLD-MCNC: 11.3 G/DL (ref 11.9–15.5)
LDLC SERPL CALC-MCNC: 70 MG/DL (ref 0–100)
LDLC/HDLC SERPL: 3.17 {RATIO}
MCH RBC QN AUTO: 29.3 PG (ref 26.9–32)
MCHC RBC AUTO-ENTMCNC: 33.7 G/DL (ref 32.4–36.3)
MCV RBC AUTO: 86.8 FL (ref 80.5–98.2)
MICROALBUMIN/CREAT UR: NORMAL MG/G
PLATELET # BLD AUTO: 75 10*3/MM3 (ref 140–500)
PMV BLD AUTO: 10.5 FL (ref 6–12)
POTASSIUM BLD-SCNC: 4.5 MMOL/L (ref 3.5–5.2)
RBC # BLD AUTO: 3.86 10*6/MM3 (ref 3.9–5.2)
SODIUM BLD-SCNC: 135 MMOL/L (ref 136–145)
TRIGL SERPL-MCNC: 186 MG/DL (ref 0–150)
VLDLC SERPL-MCNC: 37.2 MG/DL (ref 5–40)
WBC NRBC COR # BLD: 3.38 10*3/MM3 (ref 4.5–10.7)

## 2017-12-18 PROCEDURE — 84681 ASSAY OF C-PEPTIDE: CPT | Performed by: INTERNAL MEDICINE

## 2017-12-18 PROCEDURE — 86341 ISLET CELL ANTIBODY: CPT | Performed by: INTERNAL MEDICINE

## 2017-12-18 PROCEDURE — 63710000001 INSULIN ASPART PER 5 UNITS: Performed by: INTERNAL MEDICINE

## 2017-12-18 PROCEDURE — 80048 BASIC METABOLIC PNL TOTAL CA: CPT | Performed by: INTERNAL MEDICINE

## 2017-12-18 PROCEDURE — 25810000003 POTASSIUM CHLORIDE PER 2 MEQ: Performed by: INTERNAL MEDICINE

## 2017-12-18 PROCEDURE — 80061 LIPID PANEL: CPT | Performed by: INTERNAL MEDICINE

## 2017-12-18 PROCEDURE — 99232 SBSQ HOSP IP/OBS MODERATE 35: CPT | Performed by: INTERNAL MEDICINE

## 2017-12-18 PROCEDURE — 25010000002 MORPHINE PER 10 MG: Performed by: INTERNAL MEDICINE

## 2017-12-18 PROCEDURE — 25010000002 PROMETHAZINE PER 50 MG: Performed by: INTERNAL MEDICINE

## 2017-12-18 PROCEDURE — 25010000002 ENOXAPARIN PER 10 MG: Performed by: INTERNAL MEDICINE

## 2017-12-18 PROCEDURE — 85027 COMPLETE CBC AUTOMATED: CPT | Performed by: INTERNAL MEDICINE

## 2017-12-18 PROCEDURE — 25010000002 FLUCONAZOLE PER 200 MG: Performed by: INTERNAL MEDICINE

## 2017-12-18 PROCEDURE — 99233 SBSQ HOSP IP/OBS HIGH 50: CPT | Performed by: INTERNAL MEDICINE

## 2017-12-18 PROCEDURE — 82962 GLUCOSE BLOOD TEST: CPT

## 2017-12-18 RX ORDER — PREGABALIN 75 MG/1
75 CAPSULE ORAL 2 TIMES DAILY
Status: DISCONTINUED | OUTPATIENT
Start: 2017-12-18 | End: 2017-12-20 | Stop reason: HOSPADM

## 2017-12-18 RX ORDER — ALPRAZOLAM 0.5 MG/1
0.5 TABLET ORAL 2 TIMES DAILY PRN
Status: DISCONTINUED | OUTPATIENT
Start: 2017-12-18 | End: 2017-12-20 | Stop reason: HOSPADM

## 2017-12-18 RX ORDER — PANTOPRAZOLE SODIUM 40 MG/1
40 TABLET, DELAYED RELEASE ORAL DAILY
Status: DISCONTINUED | OUTPATIENT
Start: 2017-12-18 | End: 2017-12-20 | Stop reason: HOSPADM

## 2017-12-18 RX ORDER — LEVETIRACETAM 500 MG/1
500 TABLET ORAL 2 TIMES DAILY
Status: DISCONTINUED | OUTPATIENT
Start: 2017-12-18 | End: 2017-12-20 | Stop reason: HOSPADM

## 2017-12-18 RX ORDER — MELATONIN
1000 DAILY
Status: DISCONTINUED | OUTPATIENT
Start: 2017-12-18 | End: 2017-12-20 | Stop reason: HOSPADM

## 2017-12-18 RX ORDER — CYCLOSPORINE 0.5 MG/ML
1 EMULSION OPHTHALMIC EVERY 12 HOURS SCHEDULED
Status: DISCONTINUED | OUTPATIENT
Start: 2017-12-18 | End: 2017-12-20 | Stop reason: HOSPADM

## 2017-12-18 RX ORDER — ALPRAZOLAM 0.5 MG/1
0.5 TABLET ORAL 2 TIMES DAILY PRN
Status: DISCONTINUED | OUTPATIENT
Start: 2017-12-18 | End: 2017-12-18 | Stop reason: SDUPTHER

## 2017-12-18 RX ORDER — OXYCODONE HYDROCHLORIDE 5 MG/1
5 TABLET ORAL EVERY 8 HOURS PRN
Status: DISCONTINUED | OUTPATIENT
Start: 2017-12-18 | End: 2017-12-20 | Stop reason: HOSPADM

## 2017-12-18 RX ADMIN — PROMETHAZINE HYDROCHLORIDE 12.5 MG: 25 INJECTION INTRAMUSCULAR; INTRAVENOUS at 23:31

## 2017-12-18 RX ADMIN — PROMETHAZINE HYDROCHLORIDE 12.5 MG: 25 INJECTION INTRAMUSCULAR; INTRAVENOUS at 08:53

## 2017-12-18 RX ADMIN — ALPRAZOLAM 0.5 MG: 0.5 TABLET ORAL at 21:10

## 2017-12-18 RX ADMIN — FLUCONAZOLE 100 MG: 2 INJECTION INTRAVENOUS at 08:46

## 2017-12-18 RX ADMIN — MORPHINE SULFATE 2 MG: 2 INJECTION, SOLUTION INTRAMUSCULAR; INTRAVENOUS at 08:54

## 2017-12-18 RX ADMIN — VITAMIN D, TAB 1000IU (100/BT) 1000 UNITS: 25 TAB at 20:55

## 2017-12-18 RX ADMIN — POTASSIUM CHLORIDE AND SODIUM CHLORIDE 250 ML/HR: 450; 150 INJECTION, SOLUTION INTRAVENOUS at 03:59

## 2017-12-18 RX ADMIN — ALPRAZOLAM 0.5 MG: 0.5 TABLET ORAL at 02:22

## 2017-12-18 RX ADMIN — INSULIN ASPART 15 UNITS: 100 INJECTION, SOLUTION INTRAVENOUS; SUBCUTANEOUS at 17:23

## 2017-12-18 RX ADMIN — CYCLOSPORINE 1 DROP: 0.5 EMULSION OPHTHALMIC at 21:25

## 2017-12-18 RX ADMIN — ENOXAPARIN SODIUM 40 MG: 40 INJECTION SUBCUTANEOUS at 23:16

## 2017-12-18 RX ADMIN — PANTOPRAZOLE SODIUM 40 MG: 40 TABLET, DELAYED RELEASE ORAL at 20:58

## 2017-12-18 RX ADMIN — PREGABALIN 75 MG: 75 CAPSULE ORAL at 20:55

## 2017-12-18 RX ADMIN — DULOXETINE HYDROCHLORIDE 90 MG: 60 CAPSULE, DELAYED RELEASE ORAL at 20:54

## 2017-12-18 RX ADMIN — INSULIN ASPART 16 UNITS: 100 INJECTION, SOLUTION INTRAVENOUS; SUBCUTANEOUS at 17:23

## 2017-12-18 RX ADMIN — PANTOPRAZOLE SODIUM 40 MG: 40 TABLET, DELAYED RELEASE ORAL at 05:58

## 2017-12-18 RX ADMIN — LEVETIRACETAM 500 MG: 500 TABLET, FILM COATED ORAL at 20:54

## 2017-12-18 RX ADMIN — POTASSIUM CHLORIDE AND SODIUM CHLORIDE 250 ML/HR: 450; 150 INJECTION, SOLUTION INTRAVENOUS at 08:46

## 2017-12-18 RX ADMIN — INSULIN ASPART 8 UNITS: 100 INJECTION, SOLUTION INTRAVENOUS; SUBCUTANEOUS at 21:11

## 2017-12-18 RX ADMIN — MORPHINE SULFATE 2 MG: 2 INJECTION, SOLUTION INTRAMUSCULAR; INTRAVENOUS at 23:31

## 2017-12-18 RX ADMIN — INSULIN ASPART 16 UNITS: 100 INJECTION, SOLUTION INTRAVENOUS; SUBCUTANEOUS at 11:45

## 2017-12-18 RX ADMIN — MORPHINE SULFATE 2 MG: 2 INJECTION, SOLUTION INTRAMUSCULAR; INTRAVENOUS at 02:22

## 2017-12-18 NOTE — PLAN OF CARE
Problem: Diabetes, Type 2 (Adult)  Goal: Signs and Symptoms of Listed Potential Problems Will be Absent or Manageable (Diabetes, Type 2)  Outcome: Ongoing (interventions implemented as appropriate)    Problem: Fall Risk (Adult)  Goal: Identify Related Risk Factors and Signs and Symptoms  Outcome: Outcome(s) achieved Date Met:  12/18/17  Goal: Absence of Falls  Outcome: Ongoing (interventions implemented as appropriate)    Problem: Pain, Chronic (Adult)  Goal: Identify Related Risk Factors and Signs and Symptoms  Outcome: Outcome(s) achieved Date Met:  12/18/17  Goal: Acceptable Pain Control/Comfort Level  Outcome: Ongoing (interventions implemented as appropriate)    Problem: Patient Care Overview (Adult)  Goal: Plan of Care Review  Outcome: Ongoing (interventions implemented as appropriate)

## 2017-12-18 NOTE — PROGRESS NOTES
[facility]     Inpatient Follow Up Note    Patient Identification:  Name: Silvia Zabala  Age: 57 y.o.  Sex: female  :  1960  MRN: [id number]    Information from:patient    Chief Complaint   Patient presents with   • Vomiting   • Diarrhea       History:   Feels much better. No further abdominal cramping. Tolerating diet so far.     Review of Systems:  Constitutional:  Negative   Cardiovascular:  Negative   Respiratory:  Negative             Problem List:  Patient Active Problem List    Diagnosis   • Diabetic peripheral neuropathy [E11.42]   • History of seizure disorder [Z86.69]   • Diabetic ketoacidosis without coma associated with type 2 diabetes mellitus [E13.10]   • Cirrhosis of liver without ascites [K74.60]     Overview Note:     Added automatically from request for surgery [id number]     • Gastroparesis [K31.84]   • Secondary esophageal varices with bleeding [I85.11]   • Portal hypertension [K76.6]   • Systemic lupus [M32.9]   • Secondary esophageal varices without bleeding [I85.10]   • Ascites [R18.8]   • UGI bleed [K92.2]   • Hyperlipidemia [E78.5]   • Nausea [R11.0]   • DUKES (nonalcoholic steatohepatitis) [K75.81]   • Pancytopenia [D61.818]   • Hypersplenism [D73.1]   • Type 2 diabetes mellitus, uncontrolled, with neuropathy [E11.40, E11.65]   • Fibrositis [M79.7]   • Change in blood platelet count [YKY5516]   • Cirrhosis of liver [K74.60]   • Rheumatoid arthritis [M06.9]   • Anxiety and depression [F41.8]     Current Meds:  MAR Reviewed  Scheduled Meds:  enoxaparin 40 mg Subcutaneous Q24H   fluconazole 100 mg Intravenous Daily   insulin aspart 0-24 Units Subcutaneous 4x Daily With Meals & Nightly   insulin detemir 80 Units Subcutaneous Nightly   insulin regular 0.1 Units/kg Intravenous Once   pantoprazole 40 mg Oral Q AM   sodium chloride 1,000 mL Intravenous Once     Continuous Infusions:  dextrose 5 % and sodium chloride 0.45 % 150 mL/hr    dextrose 5 % and sodium  "chloride 0.45 % 150 mL/hr    dextrose 5 % and sodium chloride 0.45 % with KCl 20 mEq/L 150 mL/hr    dextrose 5 % and sodium chloride 0.45 % with KCl 20 mEq/L 150 mL/hr    sodium chloride 0.45 % with KCl 20 mEq 250 mL/hr Last Rate: 250 mL/hr (17 0846)     PRN Meds:.•  ALPRAZolam  •  dextrose  •  dextrose  •  dextrose  •  dextrose  •  dextrose 5 % and sodium chloride 0.45 %  •  dextrose 5 % and sodium chloride 0.45 %  •  dextrose 5 % and sodium chloride 0.45 % with KCl 20 mEq/L  •  dextrose 5 % and sodium chloride 0.45 % with KCl 20 mEq/L  •  glucagon (human recombinant)  •  influenza vaccine  •  insulin regular  •  insulin regular  •  Morphine **OR** Morphine  •  ondansetron **OR** ondansetron ODT **OR** ondansetron  •  potassium chloride **OR** potassium chloride **OR** potassium chloride  •  potassium chloride **OR** potassium chloride **OR** potassium chloride  •  potassium chloride **OR** potassium chloride **OR** potassium chloride  •  promethazine  •  sodium chloride 0.45 % with KCl 20 mEq  •  sodium chloride  •  sodium chloride  Allergies:  Allergies   Allergen Reactions   • Albuterol Anaphylaxis   • Tramadol Other (See Comments)     Per pt causes her \"palsy, meaning shaking and tremors\" and gi upset, nausea   • Quinine Derivatives Nausea And Vomiting       Intake/Output:     Intake/Output Summary (Last 24 hours) at 17 1308  Last data filed at 17 0853   Gross per 24 hour   Intake           6131.1 ml   Output             1500 ml   Net           4631.1 ml     New allergies/reactions:  None    Physical Exam:  Vitals:   Temp (24hrs), Av.9 °F (37.2 °C), Min:98.3 °F (36.8 °C), Max:99.7 °F (37.6 °C)    Temp:  [98.3 °F (36.8 °C)-99.7 °F (37.6 °C)] 98.3 °F (36.8 °C)  Heart Rate:  [] 94  Resp:  [20] 20  BP: (112-161)/(61-93) 149/87  /87  Pulse 94  Temp 98.3 °F (36.8 °C)  Resp 20  Ht 168.9 cm (66.5\")  Wt 77.4 kg (170 lb 11.2 oz)  SpO2 100%  BMI 27.14 kg/m2    Exam:  NAD  Tardive " dyskinesia present  PERRLA. Sclerae and conjunctivae normal  HENT: external inspection normal. Hearing intact.  No respiratory distress. Lungs clear.  Cardiac: no MRG.  Abdomen: bowel sounds active. Soft, non-tender, no HSM.  Presence of edema:no  Alert, oriented, normal affect.         DATA:  Radiology and Labs:   Recent Results (from the past 24 hour(s))   POC Glucose Once    Collection Time: 12/17/17  3:58 PM   Result Value Ref Range    Glucose 216 (H) 70 - 130 mg/dL   POC Glucose Once    Collection Time: 12/17/17  8:43 PM   Result Value Ref Range    Glucose 198 (H) 70 - 130 mg/dL   POC Glucose Once    Collection Time: 12/17/17 11:56 PM   Result Value Ref Range    Glucose 218 (H) 70 - 130 mg/dL   Microalbumin / Creatinine Urine Ratio -    Collection Time: 12/17/17 11:58 PM   Result Value Ref Range    Microalbumin/Creatinine Ratio  mg/g    Creatinine, Urine 15.0 mg/dL    Microalbumin, Urine <1.2 mg/L   POC Glucose Once    Collection Time: 12/18/17  4:01 AM   Result Value Ref Range    Glucose 116 70 - 130 mg/dL   CBC (No Diff)    Collection Time: 12/18/17  4:05 AM   Result Value Ref Range    WBC 3.38 (L) 4.50 - 10.70 10*3/mm3    RBC 3.86 (L) 3.90 - 5.20 10*6/mm3    Hemoglobin 11.3 (L) 11.9 - 15.5 g/dL    Hematocrit 33.5 (L) 35.6 - 45.5 %    MCV 86.8 80.5 - 98.2 fL    MCH 29.3 26.9 - 32.0 pg    MCHC 33.7 32.4 - 36.3 g/dL    RDW 15.8 (H) 11.7 - 13.0 %    RDW-SD 49.7 37.0 - 54.0 fl    MPV 10.5 6.0 - 12.0 fL    Platelets 75 (L) 140 - 500 10*3/mm3   Lipid Panel    Collection Time: 12/18/17  4:05 AM   Result Value Ref Range    Total Cholesterol 129 0 - 200 mg/dL    Triglycerides 186 (H) 0 - 150 mg/dL    HDL Cholesterol 22 (L) 40 - 60 mg/dL    LDL Cholesterol  70 0 - 100 mg/dL    VLDL Cholesterol 37.2 5 - 40 mg/dL    LDL/HDL Ratio 3.17    POC Glucose Once    Collection Time: 12/18/17  7:20 AM   Result Value Ref Range    Glucose 111 70 - 130 mg/dL   Basic Metabolic Panel    Collection Time: 12/18/17  9:57 AM   Result Value  Ref Range    Glucose 327 (H) 65 - 99 mg/dL    BUN 3 (L) 6 - 20 mg/dL    Creatinine 0.71 0.57 - 1.00 mg/dL    Sodium 135 (L) 136 - 145 mmol/L    Potassium 4.5 3.5 - 5.2 mmol/L    Chloride 106 98 - 107 mmol/L    CO2 17.1 (L) 22.0 - 29.0 mmol/L    Calcium 7.9 (L) 8.6 - 10.5 mg/dL    eGFR Non African Amer 85 >60 mL/min/1.73    BUN/Creatinine Ratio 4.2 (L) 7.0 - 25.0    Anion Gap 11.9 mmol/L   POC Glucose Once    Collection Time: 12/18/17 11:14 AM   Result Value Ref Range    Glucose 329 (H) 70 - 130 mg/dL       Assessment:   Problem List:   Active Problems:    Cirrhosis of liver    Type 2 diabetes mellitus, uncontrolled, with neuropathy    DUKES (nonalcoholic steatohepatitis)    Hyperlipidemia    Gastroparesis    Diabetic ketoacidosis without coma associated with type 2 diabetes mellitus    Diabetic peripheral neuropathy    History of seizure disorder    The CT has not been officially read. She has splenomegaly and a small amount of ascites around the liver. It looks to me like she has enteritis. She is doing better.     Plan:   Await CT results. Would avoid Reglan. Domperidone might help if available.       Rich Coleman MD  [facility]  12/18/2017

## 2017-12-18 NOTE — CONSULTS
Adult Nutrition  Assessment/PES    Patient Name:  Silvia Zabala  YOB: 1960  MRN: [id number]  Admit Date:  12/16/2017    Assessment Date:  12/18/2017    Comments:  Nutrition consult for DKA.  Still with nausea and some diarrhea.  HGA1C noted. Will follow up with pt prior to DC and offer diet education.          Reason for Assessment       12/18/17 1042    Reason for Assessment    Reason For Assessment/Visit physician consult    Diagnosis Diagnosis    Endocrine DM   DKA    Gastrointestinal Esophagitis;Gastroparesis;PUD   Candida esophagitis    Hepatic Other (comment);Cirrhosis   DUKES    Immune Lupus;Rheumatoid arthritis              Nutrition/Diet History       12/18/17 1044    Nutrition/Diet History    Factors Affecting Nutritional Intake Factors    Reported/Observed By Patient    Appetite Fair    Reported GI Symptoms Nausea and vomiting            Anthropometrics       12/18/17 1044    Anthropometrics    RD Documented Current Weight  77.4 kg (170 lb 10.2 oz)    RD Documented Weight on Admission 74.3 kg (163 lb 12.8 oz)    Anthropometrics (Special Considerations)    RD Calculated BMI (kg/m2) 27    Body Mass Index (BMI)    BMI Grade 25 - 29.9 - overweight            Labs/Tests/Procedures/Meds       12/18/17 1045    Labs/Tests/Procedures/Meds    Diagnostic Test/Procedure Review reviewed, pertinent    Labs/Tests Review Reviewed;Platlets;Glucose;Hgb A1C;Triglycerides;Phos;WBC    Medication Review Anticoag;Reviewed, pertinent;Insulin;Antacid    Significant Vitals reviewed            Physical Findings       12/18/17 1046    Physical Findings/Assessment    Additional Documentation Physical Appearance (Group)    Physical Appearance    Overall Physical Appearance overweight    Gastrointestinal nausea;diarrhea    Skin other (see comments)   intact              Nutrition Prescription Ordered       12/18/17 1046    Nutrition Prescription PO    Current PO Diet Full Liquid            Evaluation of Received  Nutrient/Fluid Intake       12/18/17 1047    PO Evaluation    Number of Meals 1    % PO Intake 50%      Problem/Interventions:        Problem 1       12/18/17 1048    Nutrition Diagnoses Problem 1    Problem 1 Limited Adherence to Diet Modifications    Signs/Symptoms (evidenced by) Biochemical    Specific Labs Noted Glucose;HgbA1C              Intervention Goal       12/18/17 1049    Intervention Goal    General Maintain nutrition;Improved nutrition related lab(s);Disease management/therapy;Meet nutritional needs for age/condition;Reduce/improve symptoms    PO Tolerate PO;Increase intake;PO intake (%)    PO Intake % 75 %    Weight Maintain weight            Nutrition Intervention       12/18/17 1049    Nutrition Intervention    RD/Tech Action Follow Tx progress;Care plan reviewd;Interview for preference;Encourage intake              Education/Evaluation       12/18/17 1049    Education    Education Will Instruct as appropriate   wheb feeling better    Monitor/Evaluation    Monitor Per protocol;Symptoms;I&O;Pertinent labs;Weight;Skin status;PO intake    Education Follow-up Reinforce PRN        Electronically signed by:  Sophia Farrar RD  12/18/17 10:49 AM

## 2017-12-18 NOTE — PLAN OF CARE
Problem: Diabetes, Type 2 (Adult)  Goal: Signs and Symptoms of Listed Potential Problems Will be Absent or Manageable (Diabetes, Type 2)  Outcome: Ongoing (interventions implemented as appropriate)    Problem: Fall Risk (Adult)  Goal: Absence of Falls  Outcome: Ongoing (interventions implemented as appropriate)    Problem: Pain, Chronic (Adult)  Goal: Acceptable Pain Control/Comfort Level  Outcome: Ongoing (interventions implemented as appropriate)    Problem: Patient Care Overview (Adult)  Goal: Plan of Care Review  Outcome: Ongoing (interventions implemented as appropriate)    12/18/17 0442   Coping/Psychosocial Response Interventions   Plan Of Care Reviewed With patient   Patient Care Overview   Progress improving   Outcome Evaluation   Outcome Summary/Follow up Plan Pt remains in CCU, VSS, blood sugars remain stable. Medicated for abdominal pain as needed. CTM.

## 2017-12-18 NOTE — PROGRESS NOTES
Dr. ANATOLIY Martinez    [facility]    12/18/2017    Patient ID:  Name:  Silvia Zabala  MRN:  [id number]  1960  57 y.o.  female            CC/Reason for visit: Follow-up for DKA    HPI: No vomiting.  Nausea is improving.  Fewer abdominal cramps and no diarrhea.    Vitals:  Vitals:    12/18/17 0505 12/18/17 0610 12/18/17 0705 12/18/17 0735   BP: 122/67 145/87 112/61    BP Location:       Patient Position:       Pulse: 93 92 92    Resp:       Temp:    98.8 °F (37.1 °C)   TempSrc:       SpO2: 96% 100% 97%    Weight:       Height:               Body mass index is 27.14 kg/(m^2).    Intake/Output Summary (Last 24 hours) at 12/18/17 0929  Last data filed at 12/18/17 0853   Gross per 24 hour   Intake           6131.1 ml   Output             1500 ml   Net           4631.1 ml       Exam:  GEN:  No distress, appears stated age  EYES:   EOM-i, anicteric sclera bilat  ENT:    External ears/nose normal, OP clear  NECK:  Supple, midline trachea  LUNGS: Clear breath sounds bilat, nonlabored breathing  CV:  Normal S1S2, without murmur, no edema  ABD:  Non tender, no enlarged liver or masses  EXT:  No cyanosis or clubbing    Scheduled meds:    enoxaparin 40 mg Subcutaneous Q24H   fluconazole 100 mg Intravenous Daily   insulin aspart 0-24 Units Subcutaneous 4x Daily With Meals & Nightly   insulin detemir 80 Units Subcutaneous Nightly   insulin regular 0.1 Units/kg Intravenous Once   pantoprazole 40 mg Oral Q AM   sodium chloride 1,000 mL Intravenous Once     IV meds:                        dextrose 5 % and sodium chloride 0.45 % 150 mL/hr    dextrose 5 % and sodium chloride 0.45 % 150 mL/hr    dextrose 5 % and sodium chloride 0.45 % with KCl 20 mEq/L 150 mL/hr    dextrose 5 % and sodium chloride 0.45 % with KCl 20 mEq/L 150 mL/hr    sodium chloride 250 mL/hr    sodium chloride 0.45 % with KCl 20 mEq 250 mL/hr Last Rate: 250 mL/hr (12/18/17 0846)   sodium chloride 9 mL/hr Last Rate: 9 mL/hr (12/16/17 2326)        Data Review:   I reviewed the patient's medications and new clinical results.      Results from last 7 days  Lab Units 12/18/17  0405 12/17/17  1023 12/17/17  0606 12/17/17  0401 12/17/17  0011  12/16/17  1650   SODIUM mmol/L  --  138  --  137 135*  < > 119*   POTASSIUM mmol/L  --  4.2 3.9 3.8 3.7  < > 6.0*   CHLORIDE mmol/L  --  105  --  102 98  < > 78*   CO2 mmol/L  --  20.8*  --  21.1* 23.0  < > 18.4*   BUN mg/dL  --  6  --  9 11  < > 16   CREATININE mg/dL  --  0.70  --  0.69 0.76  < > 1.19*   CALCIUM mg/dL  --  8.0*  --  8.2* 8.6  < > 9.2   BILIRUBIN mg/dL  --   --   --   --   --   --  1.2   ALK PHOS U/L  --   --   --   --   --   --  174*   ALT (SGPT) U/L  --   --   --   --   --   --  34*   AST (SGOT) U/L  --   --   --   --   --   --  26   GLUCOSE mg/dL  --  185*  --  287* 443*  < > 981*   WBC 10*3/mm3 3.38*  --   --   --   --   --  5.63   HEMOGLOBIN g/dL 11.3*  --   --   --   --   --  14.0   PLATELETS 10*3/mm3 75*  --   --   --   --   --  124*   < > = values in this interval not displayed.                ASSESSMENT:   Active Problems:    Cirrhosis of liver    Type 2 diabetes mellitus, uncontrolled, with neuropathy    DUKES (nonalcoholic steatohepatitis)    Hyperlipidemia    Gastroparesis    Diabetic ketoacidosis without coma associated with type 2 diabetes mellitus    Diabetic peripheral neuropathy    History of seizure disorder      PLAN:  I visualized images of the CT of the abdomen.  I don't see any abnormalities but official report from radiologist is not yet available.    Otherwise patient is improving.  We will recheck chemistries today.  We will start ambulating the patient and advance her diet.  Stop all IV fluids and IV insulin.  Start home regimen of subcutaneous insulin.  Transfer out of CCU today.        Juan Martinez MD  12/18/2017

## 2017-12-18 NOTE — CONSULTS
"Diabetes Education  Assessment/Teaching    Patient Name:  Silvia Zabala  YOB: 1960  MRN: [id number]  Admit Date:  12/16/2017      Assessment Date:  12/18/2017       Most Recent Value    General Information      Referral From:  MD Luis order [A1C 11.2%]    Height  168.9 cm (66.5\")    Height Method  Stated    Weight  77.4 kg (170 lb 11.2 oz)    Weight Method  Bed scale    Pregnancy Assessment     Diabetes History     What type of diabetes do you have?  Type 2    Length of Diabetes Diagnosis  10 + years    Current DM knowledge  good    Have you had diabetes education/teaching in the past?  yes    Do you test your blood sugar at home?  yes    Have you had low blood sugar? (<70mg/dl)  yes    How often do you have low blood sugar?  rare    Have you had high blood sugar? (>140mg/dl)  yes    How often do you have high blood sugar?  frequently    How would you rate your diabetes control?  poor    What makes it difficult for you to take care of your diabetes or yourself?  -- [has been so sick in recent months that she has not felt well enought o mange her diabetes]    Do you have any diabetes complications?  circulation problems [has gastroparisis and DUKES]    Education Preferences     What areas of diabetes would you like to learn about?  avoiding high blood sugar, avoiding low blood sugar, medications for diabetes, testing my blood sugar at home, stress/coping skills    Nutrition Information     Assessment Topics     Healthy Eating - Assessment  N/A-unable to assess    Being Active - Assessment  N/A- unable to assess    Taking Medication - Assessment  Competent    Problem Solving - Assessment  Needs education    Reducing Risk - Assessment  Needs education    Healthy Coping - Assessment  Needs education    Monitoring - Assessment  Competent    DM Goals     Taking Medication - Goal  0-7 days from discharge    Monitoring - Goal  0-7 days from discharge    Contact Plan  Follow-up medical care, Outpatient DM " education referral               Most Recent Value    DM Education Needs     Meter  Has own    Meter Type  Other (comment)    Frequency of Testing  4 times a day    Medication  Insulin [on lantus 80 QHS, novolog 25u with snacks and 40u with meals]    Problem Solving  Hyperglycemia, Sick days, Symptoms, Treatment [has been very sick with nausea last few weeks]    Healthy Coping  Appropriate    Discharge Plan  Home    Motivation  Moderate    Teaching Method  Explanation, Discussion, Handouts, Teach back    Patient Response  Needs reinforcement, Verbalized understanding            Other Comments:  Spoke with pt at length about how she manages her diabetes. Pt states had been doing well with that up to last few months and annetta last few weeks when she has been too ill to test,take her meds etc.We reviewed ways to prevent from happening again,sick day rules,annetta testing for ketones. Enlisting support team to remind of meds to take or times to go to MD. We discussed CGM and if that might be helpful to see the bg at all times(would be dependent on insurance coverage). Also discussed asking Dr Burnett about if Regular insulin may be helpful because of the delayed emptying of gastroparesis.  Pt states she feels less overwhelmed in thinking of taking care of her diabetes if there are some tools to use in future.        Electronically signed by:  Teressa Arias RN  12/18/17 3:19 PM

## 2017-12-18 NOTE — PAYOR COMM NOTE
"Silvia Luther (57 y.o. Female)                 ATTENTION;  NURSE REVIEW, AUTH PENDING [id number], REPLY TO UR DEPT, PHILIP HENDERSON LPN                [phone] OR UR  [phone]       Date of Birth Social Security Number Address Home Phone MRN    1960  [id number] [place] [address] [phone] [id number]    Tenriism Marital Status          Unknown        Admission Date Admission Type Admitting Provider Attending Provider Department, Room/Bed    12/16/17 Emergency Juan Martinez MD Anaya, Ervin H., MD [facility] CARE, 328/1    Discharge Date Discharge Disposition Discharge Destination                      Attending Provider: Juan Martinez MD     Allergies:  Albuterol, Tramadol, Quinine Derivatives    Isolation:  None   Infection:  None   Code Status:  FULL    Ht:  168.9 cm (66.5\")   Wt:  77.4 kg (170 lb 11.2 oz)    Admission Cmt:  None   Principal Problem:  None                Active Insurance as of 12/16/2017     Primary Coverage     Payor Plan Insurance Group Employer/Plan Group    ANTHEM BLUE CROSS ANTHEM BLUE CROSS BLUE SHIELD PPO [id number]     Payor Plan Address Payor Plan Phone Number Effective From Effective To    [address] [phone] 1/1/2015     [place] [address]       Subscriber Name Subscriber Birth Date Member ID       JOSE LUTHER 7/12/1968 [id number]           Secondary Coverage     Payor Plan Insurance Group Employer/Plan Group    MEDICARE MEDICARE A & B      Payor Plan Address Payor Plan Phone Number Effective From Effective To    [address] [phone] 9/1/2003     [place] [address]       Subscriber Name Subscriber Birth Date Member ID       SILVIA LUTHER 1960 [id number]                 Emergency Contacts      (Rel.) Home Phone Work Phone Mobile Phone    Jose Luther (Spouse) [phone] -- [phone]    Kary Luther (Other) [phone] -- --    Mack Lora (Father) -- " -- [phone]               History & Physical      Juan Martinez MD at 12/16/2017  7:25 PM          Group: [facility]         CRITICAL CARE HISTORY AND PHYSICAL ADMISSION NOTE    Patient Identification:  Silvia Zabala  57 y.o.  female  1960  [id number]              CC: Nausea, vomiting.    History of Present Illness:  57-year-old female who presents with nausea and vomiting.  This started about 10 days ago.  It is still present.  She has a history of gastroparesis.  It's not alleviated by anything.  She has tried Reglan at home.  It is exacerbated by recent diagnoses of Candida esophagitis.  She underwent EGD which revealed Candida esophagitis.  She denies any fever or chills.  She's been vomiting and her blood sugars greater than 900.  The problem is still present.  She is now responding to some IV fluids and IV insulin in the ER.  I discussed the case directly with Dr. Montelongo.  I reviewed recent discharge summary dictated by Dr. Mayur Zavala on April 26, 2017.  The patient has a history of upper GI bleed, esophageal varices, portal hypertension, nonalcoholic steatohepatitis, type 2 diabetes, rheumatoid arthritis and cirrhosis of the liver.    Review of Systems   Constitutional: Positive for diaphoresis and fatigue. Negative for fever.   HENT: Negative for ear discharge and sore throat.    Eyes: Negative for pain and visual disturbance.   Respiratory: Positive for shortness of breath. Negative for cough.    Cardiovascular: Negative for chest pain and leg swelling.   Gastrointestinal: Positive for nausea and vomiting. Negative for abdominal pain and diarrhea.   Endocrine: Negative for cold intolerance and polyuria.   Genitourinary: Negative for dysuria and hematuria.   Musculoskeletal: Negative for joint swelling and myalgias.   Skin: Negative for rash and wound.   Neurological: Positive for weakness. Negative for speech difficulty and numbness.   Hematological: Negative for adenopathy.  Does not bruise/bleed easily.   Psychiatric/Behavioral: Negative for agitation and confusion.       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   • Cirrhosis    • Colon polyps    • Deafness    • Depression    • Diabetes mellitus     Adult onset, insulin requiring   • Duodenal ulcer with hemorrhage    • Esophageal varices determined by endoscopy    • Fibromyalgia    • Gallbladder disease    • Gastroparesis    • Glaucoma    • Granuloma annulare    • H/O B12 deficiency    • H/O Bleeding ulcer     And gastroesophageal varices   • H/O mixed connective tissue disorder    • Hemorrhoids    • Hiatal hernia    • History of transfusion    • Hx of being hospitalized     In [place] for GI bleeding from ulcer and varices   • Hyperlipidemia    • Migraine    • BRICE (obstructive sleep apnea)    • Pancreas divisum    • Pancytopenia     Chronic, due to cirrhosis and hypersplenism   • Peptic ulcer disease     And esophageal varices   • PONV (postoperative nausea and vomiting)    • RA (rheumatoid arthritis)    • Seizure disorder     LAST ONE SEVERAL YEARS AGO   • Seizures    • Systemic lupus        Past Surgical History:  Past Surgical History:   Procedure Laterality Date   • BLADDER REPAIR  1991   • CATARACT EXTRACTION     • CHOLECYSTECTOMY  1994   • ENDOSCOPY N/A 11/7/2016    Procedure: ESOPHAGOGASTRODUODENOSCOPY WITH COLD POLYPECTOMY, BANDING,  AND CLIPS TIMES 2;  Surgeon: Rich Coleman MD;  Location: [facility] ENDOSCOPY;  Service:    • ENDOSCOPY N/A 12/22/2016    Procedure: ESOPHAGOGASTRODUODENOSCOPY WITH ESOPHAGEAL BANDING. 5 BANDS FIRED, 4 BANDS ADHERERD TO MUCOSA;  Surgeon: Rich Coleman MD;  Location: [facility] ENDOSCOPY;  Service:    • ENDOSCOPY N/A 2/15/2017    Procedure: ESOPHAGOGASTRODUODENOSCOPY AT BEDSIDE with  "esophageal varices banding (3);  Surgeon: Rich Coleman MD;  Location: [facility] ENDOSCOPY;  Service:    • ENDOSCOPY N/A 4/6/2017    Procedure: ESOPHAGOGASTRODUODENOSCOPY WITH ESOPHAGEAL VARICES BANDING x2;  Surgeon: Rich Coleman MD;  Location: [facility] ENDOSCOPY;  Service:    • ENDOSCOPY N/A 11/24/2017    Procedure: ESOPHAGOGASTRODUODENOSCOPY with biopsies;  Surgeon: Rich Coleman MD;  Location: [facility] ENDOSCOPY;  Service:    • ENDOSCOPY AND COLONOSCOPY  2014    Dr. Rich Coleman with findings of candida esophagitis   • EYE SURGERY     • HYSTERECTOMY  1986    partial   • JOINT REPLACEMENT     • KNEE SURGERY Right 1995    \"right knee recontstruction\"   • LIVER BIOPSY  01/2015   • MASS EXCISION     • STOMACH SURGERY          Home Meds:  Reviewed and reconciled    Allergies:  Allergies   Allergen Reactions   • Albuterol Anaphylaxis   • Tramadol Other (See Comments)     Per pt causes her \"palsy, meaning shaking and tremors\" and gi upset, nausea   • Quinine Derivatives Nausea And Vomiting       Social History:   Social History     Social History   • Marital status:      Spouse name: Jose   • Number of children: N/A   • Years of education: N/A     Occupational History   •  Disabled     Social History Main Topics   • Smoking status: Former Smoker     Packs/day: 0.00     Years: 0.00     Quit date: 12/17/2015   • Smokeless tobacco: Never Used   • Alcohol use No   • Drug use: No   • Sexual activity: Defer       Social History Narrative    Moved to [place] from [place]. She is currently medically disabled.       Family History:  Family History   Problem Relation Age of Onset   • Liver disease Other    • Depression Other    • Heart disease Other    • Hypertension Other    • Diabetes Other    • Breast cancer Other    • Brain cancer Other    • Uterine cancer Other    • Colon cancer Other    • Leukemia Other    • Colon cancer Maternal Aunt    • Hypertension Mother    • Diabetes type II Mother    • Rheum " "arthritis Mother    • Liver disease Mother      DUKES   • Heart disease Father    • Diabetes type II Father    • Diabetes type II Sister    • Lupus Sister    • Malig Hyperthermia Neg Hx        Physical Exam:  /89 (BP Location: Right arm, Patient Position: Lying)  Pulse 110  Temp 98.1 °F (36.7 °C) (Tympanic)   Resp 18  Ht 167.6 cm (66\")  Wt 76.2 kg (168 lb)  SpO2 100%  BMI 27.12 kg/m2 Body mass index is 27.12 kg/(m^2). 100% 76.2 kg (168 lb)  Physical Exam   Constitutional:   Appears acutely ill.  Alert and oriented ×4, somewhat diaphoretic.  Has a ketotic breath.  Very dry mucosal membranes   HENT:   Head: Normocephalic.   Mouth/Throat: Oropharynx is clear and moist.   Eyes: Conjunctivae and EOM are normal. No scleral icterus.   Neck: No tracheal deviation present. No thyromegaly present.   Cardiovascular: Normal rate, regular rhythm and normal heart sounds.    Pulmonary/Chest: No respiratory distress. She has no wheezes. She exhibits no tenderness.   Abdominal: She exhibits no distension and no mass. There is no tenderness.   Musculoskeletal: Normal range of motion. She exhibits no deformity.   Neurological: She is alert. No cranial nerve deficit. She exhibits normal muscle tone.   Skin: Skin is warm. No rash noted. She is diaphoretic. No erythema.   Psychiatric: She has a normal mood and affect. Thought content normal.       LABS:  Lab Results   Component Value Date    CALCIUM 8.5 (L) 12/16/2017    PHOS 1.6 (L) 12/16/2017     Results from last 7 days  Lab Units 12/16/17  1839 12/16/17  1650   MAGNESIUM mg/dL 1.9  --    SODIUM mmol/L 126* 119*   POTASSIUM mmol/L 4.1 6.0*   CHLORIDE mmol/L 85* 78*   CO2 mmol/L 18.2* 18.4*   BUN mg/dL 16 16   CREATININE mg/dL 1.11* 1.19*   GLUCOSE mg/dL 842* 981*   CALCIUM mg/dL 8.5* 9.2   WBC 10*3/mm3  --  5.63   HEMOGLOBIN g/dL  --  14.0   PLATELETS 10*3/mm3  --  124*   ALT (SGPT) U/L  --  34*   AST (SGOT) U/L  --  26     Lab Results   Component Value Date    CKTOTAL 83 " "02/16/2017    CKMB 1.34 02/16/2017    TROPONINT <0.010 02/16/2017                   Results from last 7 days  Lab Units 12/16/17  1809   PH, ARTERIAL pH units 7.454*   PCO2, ARTERIAL mm Hg 24.2*   PO2 ART mm Hg 76.9*   MODALITY  Room Air   O2 SATURATION CALC % 96.2                 Lab Results   Component Value Date    TSH 3.980 03/07/2017     Estimated Creatinine Clearance: 59.8 mL/min (by C-G formula based on Cr of 1.11).    Results from last 7 days  Lab Units 12/16/17  1823   NITRITE UA  Negative        Imaging: I personally visualized the images of scans/x-rays performed within last 30 days.      Assessment:  Diabetic ketoacidosis  Candida esophagitis causing nausea vomiting  Hypophosphatemia  Acute kidney injury  Hyponatremia  Nonalcoholic steatohepatitis  Portal hypertension        Recommendations:  Admit to the intensive care unit  Start IV insulin drip with DKA protocol.  Correct potassium and phosphorus.  Check magnesium level and correct if necessary.  Start IV Diflucan for Candida esophagitis.  Consult endocrinology.  Consult gastroenterology.    Total critical care time 35 minutes, excluding any separately billable procedure time          Juan Martinez MD  12/16/2017  7:26 PM      Much of this encounter note is an electronic transcription/translation of spoken language to printed text using Dragon Software.     Electronically signed by Juan Martinez MD at 12/16/2017  7:32 PM           Emergency Department Notes      Rocío Taylor RN at 12/16/2017  4:30 PM          C/o N/V/D. Vomited x4 and unable to take medicines. diarrhea x7. No bld in emesis, possible bld in stool \"but I have hemorrhoids.\"    Has been on clotimazole for few days and reports yeast infection as well now.     +lightheaded     Rocío Taylor RN  12/16/17 1633       Electronically signed by Rocío Taylor RN at 12/16/2017  4:33 PM      Baljit Lovell RN at 12/16/2017  4:37 PM          Pt has hx of c.diff and has recently been on " antibiotics.     Electronically signed by Baljit Lovell RN at 12/16/2017  4:38 PM      Rocío Taylor RN at 12/16/2017  5:17 PM          Report given to chelsea Taylor RN  12/16/17 1717       Electronically signed by Rocío Taylor RN at 12/16/2017  5:17 PM      Teena Pang PA-C at 12/16/2017  5:21 PM      Procedure Orders:    1. Critical Care [[id number]] ordered by Darinel Montelongo MD at 12/16/17 1845           Attestation signed by Darinel Montelongo MD at 12/16/2017  8:32 PM        I supervised care provided by the midlevel provider.   We have discussed this patient's history, physical exam, and treatment plan.  I have reviewed the note and personally saw and examined the patient and agree with the plan of care. See attending note.        For this patient encounter, I reviewed the NP or PA documentation, treatment plan, and medical decision making. Darinel Montelongo MD 12/16/2017 8:32 PM                               EMERGENCY DEPARTMENT ENCOUNTER    CHIEF COMPLAINT  Chief Complaint: N/V/D  History given by: Patient  History limited by: None  Room Number: 11/11  PMD: Blanca Pitts MD      HPI:  Pt is a 57 y.o. female who presents to the ED complaining of N/V/D for the 10 days which is worse than her gastroparesis. She reports taking her normal doses of Reglan. She was prescribed Clotrimazole post status EDG that showed candidiasis.    Duration: 10 days  Timing: constant  Location: diffuse  Radiation: none  Quality: N/V/D  Intensity/Severity: moderate  Progression: unchanged  Associated Symptoms: none  Aggravating Factors: none  Alleviating Factors: none  Previous Episodes: Patient has history of gastroparesis.  Treatment before arrival: Prescribed Clotrimazole.         MEDICAL RECORD REVIEW  The patient has a history of lupus, RA, gastroparesis, seizure disorder, diabetes, cirrhosis of liver, and insulin diabetic. She has a history of CDiff and recently prescribed Clotrimazole.     PAST  MEDICAL HISTORY  Active Ambulatory Problems     Diagnosis Date Noted   • Cirrhosis of liver 03/08/2016   • Rheumatoid arthritis 03/08/2016   • Anxiety and depression 03/08/2016   • Type 2 diabetes mellitus, uncontrolled, with neuropathy 03/15/2016   • Fibrositis 03/15/2016   • Change in blood platelet count 03/15/2016   • Pancytopenia 04/12/2016   • Hypersplenism 04/12/2016   • Nausea 06/14/2016   • DUKES (nonalcoholic steatohepatitis) 06/14/2016   • Hyperlipidemia    • UGI bleed 02/15/2017   • Ascites 02/21/2017   • Portal hypertension 02/22/2017   • Systemic lupus 02/22/2017   • Secondary esophageal varices without bleeding 02/22/2017   • Secondary esophageal varices with bleeding 03/07/2017   • Upper GI bleed 04/25/2017   • Gastroparesis 10/17/2017   • Cirrhosis of liver without ascites 11/17/2017     Resolved Ambulatory Problems     Diagnosis Date Noted   • Generalized abdominal pain 02/14/2017     Past Medical History:   Diagnosis Date   • Anemia    • Anxiety    • Arthritis    • Chromosomal abnormality    • Cirrhosis    • Colon polyps    • Deafness    • Depression    • Diabetes mellitus    • Duodenal ulcer with hemorrhage    • Esophageal varices determined by endoscopy    • Fibromyalgia    • Gallbladder disease    • Gastroparesis    • Glaucoma    • Granuloma annulare    • H/O B12 deficiency    • H/O Bleeding ulcer    • H/O mixed connective tissue disorder    • Hemorrhoids    • Hiatal hernia    • History of transfusion    • Hx of being hospitalized    • Hyperlipidemia    • Migraine    • BRICE (obstructive sleep apnea)    • Pancreas divisum    • Pancytopenia    • Peptic ulcer disease    • PONV (postoperative nausea and vomiting)    • RA (rheumatoid arthritis)    • Seizure disorder    • Seizures    • Systemic lupus        PAST SURGICAL HISTORY  Past Surgical History:   Procedure Laterality Date   • BLADDER REPAIR  1991   • CATARACT EXTRACTION     • CHOLECYSTECTOMY  1994   • ENDOSCOPY N/A 11/7/2016    Procedure:  "ESOPHAGOGASTRODUODENOSCOPY WITH COLD POLYPECTOMY, BANDING,  AND CLIPS TIMES 2;  Surgeon: Rich Coleman MD;  Location: [facility] ENDOSCOPY;  Service:    • ENDOSCOPY N/A 12/22/2016    Procedure: ESOPHAGOGASTRODUODENOSCOPY WITH ESOPHAGEAL BANDING. 5 BANDS FIRED, 4 BANDS ADHERERD TO MUCOSA;  Surgeon: Rich Coleman MD;  Location: [facility] ENDOSCOPY;  Service:    • ENDOSCOPY N/A 2/15/2017    Procedure: ESOPHAGOGASTRODUODENOSCOPY AT BEDSIDE with esophageal varices banding (3);  Surgeon: Rich Coleman MD;  Location: [facility] ENDOSCOPY;  Service:    • ENDOSCOPY N/A 4/6/2017    Procedure: ESOPHAGOGASTRODUODENOSCOPY WITH ESOPHAGEAL VARICES BANDING x2;  Surgeon: Rich Coleman MD;  Location: [facility] ENDOSCOPY;  Service:    • ENDOSCOPY N/A 11/24/2017    Procedure: ESOPHAGOGASTRODUODENOSCOPY with biopsies;  Surgeon: Rich Coleman MD;  Location: [facility] ENDOSCOPY;  Service:    • ENDOSCOPY AND COLONOSCOPY  2014    Dr. Rich Coleman with findings of candida esophagitis   • EYE SURGERY     • HYSTERECTOMY  1986    partial   • JOINT REPLACEMENT     • KNEE SURGERY Right 1995    \"right knee recontstruction\"   • LIVER BIOPSY  01/2015   • MASS EXCISION     • STOMACH SURGERY         FAMILY HISTORY  Family History   Problem Relation Age of Onset   • Liver disease Other    • Depression Other    • Heart disease Other    • Hypertension Other    • Diabetes Other    • Breast cancer Other    • Brain cancer Other    • Uterine cancer Other    • Colon cancer Other    • Leukemia Other    • Colon cancer Maternal Aunt    • Hypertension Mother    • Diabetes type II Mother    • Rheum arthritis Mother    • Liver disease Mother      DUKES   • Heart disease Father    • Diabetes type II Father    • Diabetes type II Sister    • Lupus Sister    • Malig Hyperthermia Neg Hx        SOCIAL HISTORY  Social History     Social History   • Marital status:      Spouse name: Jose   • Number of children: N/A   • Years of education: N/A     Occupational " History   •  Disabled     Social History Main Topics   • Smoking status: Former Smoker     Packs/day: 0.00     Years: 0.00     Quit date: 12/17/2015   • Smokeless tobacco: Never Used   • Alcohol use No   • Drug use: No   • Sexual activity: Defer     Other Topics Concern   • Not on file     Social History Narrative    Moved to [place] from [place]. She is currently medically disabled.       ALLERGIES  Albuterol; Tramadol; and Quinine derivatives    REVIEW OF SYSTEMS  Review of Systems   Constitutional: Negative for fever.   HENT: Negative for sore throat.    Eyes: Negative.    Respiratory: Negative for cough and shortness of breath.    Cardiovascular: Negative for chest pain.   Gastrointestinal: Positive for diarrhea, nausea and vomiting. Negative for abdominal pain.   Genitourinary: Negative for dysuria.   Musculoskeletal: Negative for neck pain.   Skin: Negative for rash.   Allergic/Immunologic: Negative.    Neurological: Negative for weakness, numbness and headaches.   Hematological: Negative.    Psychiatric/Behavioral: Negative.    All other systems reviewed and are negative.      PHYSICAL EXAM  ED Triage Vitals   Temp Heart Rate Resp BP SpO2   12/16/17 1631 12/16/17 1631 12/16/17 1636 12/16/17 1636 12/16/17 1631   98.1 °F (36.7 °C) 128 20 162/85 99 %      Temp src Heart Rate Source Patient Position BP Location FiO2 (%)   12/16/17 1631 12/16/17 1631 -- -- --   Tympanic Monitor          Physical Exam   Constitutional: She is oriented to person, place, and time and well-developed, well-nourished, and in no distress. No distress.   HENT:   Head: Normocephalic and atraumatic.   Mouth/Throat: Oropharynx is clear and moist. Mucous membranes are dry.   Eyes: EOM are normal.   Neck: Normal range of motion. Neck supple.   Cardiovascular: Regular rhythm.  Tachycardia present.    Pulmonary/Chest: Effort normal and breath sounds normal. No respiratory distress. She has no wheezes. She exhibits no tenderness.   Abdominal:  Soft. She exhibits no distension. There is generalized tenderness (chronic). There is no rebound.   Musculoskeletal: Normal range of motion. She exhibits no edema.   Lymphadenopathy:     She has no cervical adenopathy.   Neurological: She is alert and oriented to person, place, and time.   Skin: Skin is warm and dry. No rash noted. No pallor.   Psychiatric: Mood, memory, affect and judgment normal.   Nursing note and vitals reviewed.      LAB RESULTS  Recent Results (from the past 24 hour(s))   Comprehensive Metabolic Panel    Collection Time: 12/16/17  4:50 PM   Result Value Ref Range    Glucose 981 (C) 65 - 99 mg/dL    BUN 16 6 - 20 mg/dL    Creatinine 1.19 (H) 0.57 - 1.00 mg/dL    Sodium 119 (C) 136 - 145 mmol/L    Potassium 6.0 (H) 3.5 - 5.2 mmol/L    Chloride 78 (L) 98 - 107 mmol/L    CO2 18.4 (L) 22.0 - 29.0 mmol/L    Calcium 9.2 8.6 - 10.5 mg/dL    Total Protein 8.3 6.0 - 8.5 g/dL    Albumin 3.90 3.50 - 5.20 g/dL    ALT (SGPT) 34 (H) 1 - 33 U/L    AST (SGOT) 26 1 - 32 U/L    Alkaline Phosphatase 174 (H) 39 - 117 U/L    Total Bilirubin 1.2 0.1 - 1.2 mg/dL    eGFR Non African Amer 47 (L) >60 mL/min/1.73    Globulin 4.4 gm/dL    A/G Ratio 0.9 g/dL    BUN/Creatinine Ratio 13.4 7.0 - 25.0    Anion Gap 22.6 mmol/L   Lipase    Collection Time: 12/16/17  4:50 PM   Result Value Ref Range    Lipase 22 13 - 60 U/L   Light Blue Top    Collection Time: 12/16/17  4:50 PM   Result Value Ref Range    Extra Tube hold for add-on    Green Top (Gel)    Collection Time: 12/16/17  4:50 PM   Result Value Ref Range    Extra Tube Hold for add-ons.    Lavender Top    Collection Time: 12/16/17  4:50 PM   Result Value Ref Range    Extra Tube hold for add-on    Gold Top - SST    Collection Time: 12/16/17  4:50 PM   Result Value Ref Range    Extra Tube Hold for add-ons.    CBC Auto Differential    Collection Time: 12/16/17  4:50 PM   Result Value Ref Range    WBC 5.63 4.50 - 10.70 10*3/mm3    RBC 4.71 3.90 - 5.20 10*6/mm3    Hemoglobin  14.0 11.9 - 15.5 g/dL    Hematocrit 42.2 35.6 - 45.5 %    MCV 89.6 80.5 - 98.2 fL    MCH 29.7 26.9 - 32.0 pg    MCHC 33.2 32.4 - 36.3 g/dL    RDW 15.3 (H) 11.7 - 13.0 %    RDW-SD 49.5 37.0 - 54.0 fl    MPV 10.7 6.0 - 12.0 fL    Platelets 124 (L) 140 - 500 10*3/mm3    Neutrophil % 74.2 42.7 - 76.0 %    Lymphocyte % 17.1 (L) 19.6 - 45.3 %    Monocyte % 6.6 5.0 - 12.0 %    Eosinophil % 1.1 0.3 - 6.2 %    Basophil % 0.5 0.0 - 1.5 %    Immature Grans % 0.5 0.0 - 0.5 %    Neutrophils, Absolute 4.18 1.90 - 8.10 10*3/mm3    Lymphocytes, Absolute 0.96 0.90 - 4.80 10*3/mm3    Monocytes, Absolute 0.37 0.20 - 1.20 10*3/mm3    Eosinophils, Absolute 0.06 0.00 - 0.70 10*3/mm3    Basophils, Absolute 0.03 0.00 - 0.20 10*3/mm3    Immature Grans, Absolute 0.03 0.00 - 0.03 10*3/mm3   Hemoglobin A1c    Collection Time: 12/16/17  4:50 PM   Result Value Ref Range    Hemoglobin A1C 11.20 (H) 4.80 - 5.60 %   Clostridium Difficile Toxin, PCR - Stool, Per Rectum    Collection Time: 12/16/17  5:07 PM   Result Value Ref Range    C. Difficile Toxins by PCR Negative Negative   POC Glucose Once    Collection Time: 12/16/17  5:37 PM   Result Value Ref Range    Glucose >599 (C) 70 - 130 mg/dL   Blood Gas, Arterial    Collection Time: 12/16/17  6:09 PM   Result Value Ref Range    Site Arterial: right brachial     Mayur's Test N/A     pH, Arterial 7.454 (H) 7.350 - 7.450 pH units    pCO2, Arterial 24.2 (L) 35.0 - 45.0 mm Hg    pO2, Arterial 76.9 (L) 80.0 - 100.0 mm Hg    HCO3, Arterial 17.0 (L) 22.0 - 28.0 mmol/L    Base Excess, Arterial -5.3 (L) 0.0 - 2.0 mmol/L    O2 Saturation Calculated 96.2 92.0 - 99.0 %    Barometric Pressure for Blood Gas 752.1 mmHg    Modality Room Air     Rate 28 Breaths/minute   Urinalysis With / Culture If Indicated - Urine, Clean Catch    Collection Time: 12/16/17  6:23 PM   Result Value Ref Range    Color, UA Yellow Yellow, Straw    Appearance, UA Clear Clear    pH, UA 6.0 5.0 - 8.0    Specific Gravity, UA >=1.030 1.005 -  1.030    Glucose, UA >=1000 mg/dL (3+) (A) Negative    Ketones, UA 15 mg/dL (1+) (A) Negative    Bilirubin, UA Negative Negative    Blood, UA Negative Negative    Protein, UA Negative Negative    Leuk Esterase, UA Negative Negative    Nitrite, UA Negative Negative    Urobilinogen, UA 0.2 E.U./dL 0.2 - 1.0 E.U./dL   Beta Hydroxybutyrate Quantitative    Collection Time: 12/16/17  6:39 PM   Result Value Ref Range    Beta-Hydroxybutyrate Quant 1.610 (H) 0.020 - 0.270 mmol/L   Phosphorus    Collection Time: 12/16/17  6:39 PM   Result Value Ref Range    Phosphorus 1.6 (L) 2.5 - 4.5 mg/dL   Basic Metabolic Panel    Collection Time: 12/16/17  6:39 PM   Result Value Ref Range    Glucose 842 (C) 65 - 99 mg/dL    BUN 16 6 - 20 mg/dL    Creatinine 1.11 (H) 0.57 - 1.00 mg/dL    Sodium 126 (L) 136 - 145 mmol/L    Potassium 4.1 3.5 - 5.2 mmol/L    Chloride 85 (L) 98 - 107 mmol/L    CO2 18.2 (L) 22.0 - 29.0 mmol/L    Calcium 8.5 (L) 8.6 - 10.5 mg/dL    eGFR Non African Amer 51 (L) >60 mL/min/1.73    BUN/Creatinine Ratio 14.4 7.0 - 25.0    Anion Gap 22.8 mmol/L   Magnesium    Collection Time: 12/16/17  6:39 PM   Result Value Ref Range    Magnesium 1.9 1.6 - 2.6 mg/dL   Calcium, Ionized    Collection Time: 12/16/17  6:39 PM   Result Value Ref Range    Ionized Calcium 1.16 1.15 - 1.35 mmol/L    Ionized Calcium 4.6 4.6 - 5.4 mg/dL   POC Glucose Once    Collection Time: 12/16/17  6:55 PM   Result Value Ref Range    Glucose >599 (C) 70 - 130 mg/dL   POC Glucose Once    Collection Time: 12/16/17  8:05 PM   Result Value Ref Range    Glucose >599 (C) 70 - 130 mg/dL   POC Glucose Once    Collection Time: 12/16/17  8:12 PM   Result Value Ref Range    Glucose >599 (C) 70 - 130 mg/dL       I ordered the above labs and reviewed the results      EKG    EKG was interpreted by Dr. Montelongo.      COURSE & MEDICAL DECISION MAKING  Pertinent Labs and Imaging studies that were ordered and reviewed are noted above.  Results were reviewed/discussed with  the patient and they were also made aware of online assess.  Pt also made aware that some labs, such as cultures, will not be resulted during ER visit and follow up with PMD is necessary.     PROGRESS AND CONSULTS  Critical Care  Performed by: ROBYN CONTRERAS  Authorized by: BRAXTON BLAKE     Critical care provider statement:     Critical care time (minutes):  35    Critical care start time:  12/16/2017 5:21 PM    Critical care end time:  12/16/2017 5:56 PM    Critical care was necessary to treat or prevent imminent or life-threatening deterioration of the following conditions:  Dehydration and endocrine crisis    Critical care was time spent personally by me on the following activities:  Blood draw for specimens, development of treatment plan with patient or surrogate, discussions with consultants, evaluation of patient's response to treatment, examination of patient, ordering and performing treatments and interventions, ordering and review of laboratory studies, pulse oximetry, re-evaluation of patient's condition and review of old charts          PROGRESS AND CONSULTS    Progress Notes:    ED Course     1639  Ordered CBC, UA, Lipase, and CMP for further evaluation.     1745  Reviewed pt's history and workup with Dr. Blake.  After a bedside evaluation; Dr. Blake agrees with the plan of care.    1746  Ordered EKG for further evaluation.     1748  Ordered BMP and Phosphorous for further evaluation. Placed call to Nutrition and Diabetes educator for consult.     1750  Based on the patient's lab findings and presenting symptoms, the doctor and I feel it is appropriate to admit the patient for further management, evaluation, and treatment.  I have discussed this with the admitting team.  I have also discussed this with the patient/family.  They are in agreement with admission.      1818  Placed call to Pulmonology for admission.    1845  Discussed the patient's case with Dr. Martinez who agrees to admit the  patient to an ICU bed.    MEDICATIONS GIVEN IN ER  Medications   sodium chloride 0.9 % flush 10 mL (not administered)   sodium chloride 0.45 % infusion (not administered)   sodium chloride 0.45 % with KCl 20 mEq/L infusion (not administered)   dextrose 5 % and sodium chloride 0.45 % infusion (not administered)   dextrose 5 % and sodium chloride 0.45 % with KCl 20 mEq/L infusion (not administered)   insulin regular (humuLIN R,novoLIN R) 100 Units in sodium chloride 0.9 % 100 mL (1 Units/mL) infusion (0.1 Units/kg/hr × 76.2 kg Intravenous New Bag 12/16/17 6898)   insulin regular (humuLIN R,novoLIN R) injection 7.6 Units (not administered)   dextrose (D50W) solution 12.5 g (not administered)   sodium chloride 0.9 % flush 1-10 mL (not administered)   ondansetron (ZOFRAN) tablet 4 mg (not administered)     Or   ondansetron ODT (ZOFRAN-ODT) disintegrating tablet 4 mg (not administered)     Or   ondansetron (ZOFRAN) injection 4 mg (not administered)   enoxaparin (LOVENOX) syringe 30 mg (not administered)   fluconazole (DIFLUCAN) IVPB 100 mg in 50mL NaCl (not administered)   sodium chloride 0.9 % bolus 1,000 mL (not administered)   sodium chloride 0.45 % infusion (not administered)   sodium chloride 0.45 % with KCl 20 mEq/L infusion (not administered)   dextrose 5 % and sodium chloride 0.45 % infusion (not administered)   dextrose 5 % and sodium chloride 0.45 % with KCl 20 mEq/L infusion (not administered)   potassium chloride (MICRO-K) CR capsule 40 mEq (not administered)     Or   potassium chloride (KLOR-CON) packet 40 mEq (not administered)     Or   potassium chloride 10 mEq in 100 mL IVPB (not administered)   potassium chloride (MICRO-K) CR capsule 20 mEq (not administered)     Or   potassium chloride (KLOR-CON) packet 20 mEq (not administered)     Or   potassium chloride 10 mEq in 100 mL IVPB (not administered)   potassium chloride (MICRO-K) CR capsule 10 mEq (not administered)     Or   potassium chloride (KLOR-CON)  "packet 10 mEq (not administered)     Or   potassium chloride 10 mEq in 100 mL IVPB (not administered)   insulin regular (humuLIN R,novoLIN R) injection 7.6 Units (not administered)   insulin regular (humuLIN R,novoLIN R) 100 Units in sodium chloride 0.9 % 100 mL (1 Units/mL) infusion (not administered)   insulin regular (humuLIN R,novoLIN R) injection 7.6 Units (not administered)   dextrose (D50W) solution 12.5 g (not administered)   sodium chloride 0.9 % bolus 1,000 mL (0 mL Intravenous Stopped 12/16/17 1810)   insulin regular (humuLIN R,novoLIN R) injection 10 Units (10 Units Intravenous Given 12/16/17 1808)   sodium chloride 0.9 % bolus 1,000 mL (1,000 mL Intravenous New Bag 12/16/17 1811)       /89  Pulse 105  Temp 98.1 °F (36.7 °C) (Tympanic)   Resp 18  Ht 167.6 cm (66\")  Wt 76.2 kg (168 lb)  SpO2 98%  BMI 27.12 kg/m2      DIAGNOSIS  Final diagnoses:   Diabetic ketoacidosis without coma associated with type 2 diabetes mellitus   Hyperkalemia   Hyponatremia   Gastroparesis   Cirrhosis of liver without ascites, unspecified hepatic cirrhosis type       Reed report reviewed.  Risks, benefits, alternatives discussed with patient.  Pt consents to treatment and agrees to follow up with PMD tomorrow for further care and any other prescriptions.       Documentation assistance provided by tori Penn for Teena Pang PA-C.  Information recorded by the scribe was done at my direction and has been verified and validated by me.  Electronically signed by Douglas Penn on 12/16/2017 at time 8:19 PM        Douglas Penn  12/16/17 1847       Teena Pang PA-C  12/16/17 2019       Electronically signed by Darinel Montelongo MD at 12/16/2017  8:32 PM      Darinel Montelongo MD at 12/16/2017  5:58 PM          Pt presents to the ED complaining of vomiting, nausea, and diarrhea.  Informed Pt of her blood sugar levels  And other lab results.  Discussed the plan to assess her ketone level and pH level. " Discussed plan to admit Pt.      On exam, Pt is alert and oriented X 3.  Moderate distress.  Pt has a ketotic smell, pale conjunctiva, dry mucous membranes.  Regular rhythm.  Tachycardic 120, No pedal edema, normal neuro exam.      We are administering  IV fluids and IV insulin.    EKG    EKG time: 1827  Rhythm/Rate: Sinus Tachycardia 112  No Acute Ischemia  Non-Specific ST-T changes    QT of 492, No peaked T waves     Glucose over 900 with sodium of 119 and K of 6.0    unchanged compared to prior on April 2017 except now the rate is faster.     I consulted with Dr. Martinez, who will admit the patient to ICU for treatment of DKA.   He came to ED to see and admit patient.    Interpreted Contemporaneously by me.  Independently viewed by me    I supervised care provided by the midlevel provider.    We have discussed this patient's history, physical exam, and treatment plan.   I have reviewed the note and personally saw and examined the patient and agree with the plan of care.    Documentation assistance provided by tori Madrigal for Dr. Montelongo.  Information recorded by the scribe was done at my direction and has been verified and validated by me.           Daysi Madrigal  12/16/17 1925       Darinel Montelongo MD  12/16/17 2032       Electronically signed by Darinel Montelongo MD at 12/16/2017  8:32 PM        Lines, Drains & Airways    Active LDAs     Name:   Placement date:   Placement time:   Site:   Days:    Peripheral IV Line - Single Lumen 12/16/17 median cubital vein (antecubital fossa), right 22 gauge  12/16/17          2    Peripheral IV Line - Single Lumen 12/16/17 basilic vein (medial side of arm), right 22 gauge  12/16/17          2                Hospital Medications (all)       Dose Frequency Start End    ALPRAZolam (XANAX) tablet 0.5 mg 0.5 mg 2 Times Daily PRN 12/18/2017 12/28/2017    Sig - Route: Take 1 tablet by mouth 2 (Two) Times a Day As Needed for Anxiety. - Oral    dextrose (D50W) solution 12.5  g 12.5 g Every 15 Minutes PRN 12/16/2017     Sig - Route: Infuse 25 mL into a venous catheter Every 15 (Fifteen) Minutes As Needed for Low Blood Sugar (for blood glucose < 100 mg/dL). - Intravenous    Cosign for Ordering: Accepted by Darinel Montelongo MD on 12/16/2017  6:29 PM    dextrose (D50W) solution 12.5 g 12.5 g Every 15 Minutes PRN 12/16/2017     Sig - Route: Infuse 25 mL into a venous catheter Every 15 (Fifteen) Minutes As Needed for Low Blood Sugar (for blood glucose < 100 mg/dL). - Intravenous    dextrose (D50W) solution 25 g 25 g Every 15 Minutes PRN 12/17/2017     Sig - Route: Infuse 50 mL into a venous catheter Every 15 (Fifteen) Minutes As Needed for Low Blood Sugar (Blood Sugar Less Than 70, Patient Has IV Access - Unresponsive, NPO or Unable To Safely Swallow). - Intravenous    dextrose (GLUTOSE) oral gel 15 g 15 g Every 15 Minutes PRN 12/17/2017     Sig - Route: Take 15 g by mouth Every 15 (Fifteen) Minutes As Needed for Low Blood Sugar (Blood Sugar Less Than 70, Patient Alert, Is Not NPO & Can Safely Swallow). - Oral    dextrose 5 % and sodium chloride 0.45 % infusion 150 mL/hr Continuous PRN 12/16/2017     Sig - Route: Infuse 150 mL/hr into a venous catheter Continuous As Needed (Once Glucose Less Than or Equal to 200 mg/dL and Serum Potassium Greater Than 5). - Intravenous    Cosign for Ordering: Accepted by Darinel Montelongo MD on 12/16/2017  6:29 PM    dextrose 5 % and sodium chloride 0.45 % infusion 150 mL/hr Continuous PRN 12/16/2017     Sig - Route: Infuse 150 mL/hr into a venous catheter Continuous As Needed (Once Glucose Less Than or Equal to 200 mg/dL and Serum Potassium Greater Than 5). - Intravenous    dextrose 5 % and sodium chloride 0.45 % with KCl 20 mEq/L infusion 150 mL/hr Continuous PRN 12/16/2017     Sig - Route: Infuse 150 mL/hr into a venous catheter Continuous As Needed (Once Glucose Less Than or Equal to 200 mg/dL and Serum Potassium Less Than or Equal to 5). - Intravenous     Cosign for Ordering: Accepted by Darinel Montelongo MD on 12/16/2017  6:29 PM    dextrose 5 % and sodium chloride 0.45 % with KCl 20 mEq/L infusion 150 mL/hr Continuous PRN 12/16/2017     Sig - Route: Infuse 150 mL/hr into a venous catheter Continuous As Needed (Once Glucose Less Than or Equal to 200 mg/dL and Serum Potassium Less Than or Equal to 5). - Intravenous    diatrizoate meglumine-sodium (GASTROGRAFIN) 66-10 % solution 30 mL 30 mL Once 12/17/2017 12/17/2017    Sig - Route: Take 30 mL by mouth 1 (One) Time. - Oral    enoxaparin (LOVENOX) syringe 40 mg 40 mg Every 24 Hours 12/17/2017     Sig - Route: Inject 0.4 mL under the skin Daily. - Subcutaneous    fluconazole (DIFLUCAN) IVPB 100 mg in 50mL NaCl 100 mg Daily 12/16/2017 12/23/2017    Sig - Route: Infuse 50 mL into a venous catheter Daily. - Intravenous    glucagon (human recombinant) (GLUCAGEN DIAGNOSTIC) injection 1 mg 1 mg Every 15 Minutes PRN 12/17/2017     Sig - Route: Inject 1 mg under the skin Every 15 (Fifteen) Minutes As Needed (Blood Glucose Less Than 70 - Patient Without IV Access - Unresponsive, NPO or Unable To Safely Swallow). - Subcutaneous    Influenza Vac Subunit Quad (FLUCELVAX) injection 0.5 mL 0.5 mL During Hospitalization 12/16/2017     Sig - Route: Inject 0.5 mL into the shoulder, thigh, or buttocks During Hospitalization for Immunization. - Intramuscular    Cosign for Ordering: Accepted by Juan Martinez MD on 12/17/2017  7:17 AM    insulin aspart (novoLOG) injection 0-24 Units 0-24 Units 4 Times Daily With Meals & Nightly 12/17/2017     Sig - Route: Inject 0-24 Units under the skin 4 (Four) Times a Day With Meals & at Bedtime. - Subcutaneous    insulin detemir (LEVEMIR) injection 80 Units 80 Units Nightly 12/17/2017     Sig - Route: Inject 80 Units under the skin Every Night. - Subcutaneous    insulin regular (humuLIN R,novoLIN R) injection 10 Units 10 Units Once 12/16/2017 12/16/2017    Sig - Route: Infuse 10 Units into a venous  "catheter 1 (One) Time. - Intravenous    Cosign for Ordering: Accepted by Darinel Montelongo MD on 12/16/2017  6:29 PM    insulin regular (humuLIN R,novoLIN R) injection 7.6 Units 0.1 Units/kg × 76.2 kg Once As Needed 12/16/2017     Sig - Route: Infuse 7.6 Units into a venous catheter 1 (One) Time As Needed for High Blood Sugar (if the glucose does not decrease by 10% in the first hour after start of infusion). - Intravenous    Cosign for Ordering: Accepted by Darinel Montelongo MD on 12/16/2017  6:29 PM    insulin regular (humuLIN R,novoLIN R) injection 7.6 Units 0.1 Units/kg × 76.2 kg Once 12/16/2017     Sig - Route: Infuse 7.6 Units into a venous catheter 1 (One) Time. - Intravenous    insulin regular (humuLIN R,novoLIN R) injection 7.6 Units 0.1 Units/kg × 76.2 kg Once As Needed 12/16/2017     Sig - Route: Infuse 7.6 Units into a venous catheter 1 (One) Time As Needed for High Blood Sugar (if the glucose does not decrease by 10% in the first hour after start of infusion). - Intravenous    morphine injection 1 mg 1 mg Every 4 Hours PRN 12/17/2017 12/27/2017    Sig - Route: Infuse 0.5 mL into a venous catheter Every 4 (Four) Hours As Needed for Moderate Pain  or Severe Pain . - Intravenous    Linked Group 1:  \"Or\" Linked Group Details        morphine injection 2 mg 2 mg Every 4 Hours PRN 12/17/2017 12/27/2017    Sig - Route: Infuse 1 mL into a venous catheter Every 4 (Four) Hours As Needed for Severe Pain . - Intravenous    Linked Group 1:  \"Or\" Linked Group Details        ondansetron (ZOFRAN) injection 4 mg 4 mg Every 6 Hours PRN 12/16/2017     Sig - Route: Infuse 2 mL into a venous catheter Every 6 (Six) Hours As Needed for Nausea or Vomiting. - Intravenous    Linked Group 2:  \"Or\" Linked Group Details        ondansetron (ZOFRAN) tablet 4 mg 4 mg Every 6 Hours PRN 12/16/2017     Sig - Route: Take 1 tablet by mouth Every 6 (Six) Hours As Needed for Nausea or Vomiting. - Oral    Linked Group 2:  \"Or\" Linked Group Details " "       ondansetron ODT (ZOFRAN-ODT) disintegrating tablet 4 mg 4 mg Every 6 Hours PRN 12/16/2017     Sig - Route: Take 1 tablet by mouth Every 6 (Six) Hours As Needed for Nausea or Vomiting. - Oral    Linked Group 2:  \"Or\" Linked Group Details        pantoprazole (PROTONIX) EC tablet 40 mg 40 mg Every Early Morning 12/17/2017     Sig - Route: Take 1 tablet by mouth Every Morning. - Oral    potassium chloride (KLOR-CON) packet 10 mEq 10 mEq As Needed 12/16/2017     Sig - Route: Take 10 mEq by mouth As Needed (Potassium replacement per admin instructions). - Oral    Linked Group 3:  \"Or\" Linked Group Details        potassium chloride (KLOR-CON) packet 20 mEq 20 mEq As Needed 12/16/2017     Sig - Route: Take 20 mEq by mouth As Needed (Potassium replacement per admin instructions). - Oral    Linked Group 4:  \"Or\" Linked Group Details        potassium chloride (KLOR-CON) packet 40 mEq 40 mEq As Needed 12/16/2017     Sig - Route: Take 40 mEq by mouth As Needed (Potassium replacement per admin instructions). - Oral    Linked Group 5:  \"Or\" Linked Group Details        potassium chloride (MICRO-K) CR capsule 10 mEq 10 mEq As Needed 12/16/2017     Sig - Route: Take 1 capsule by mouth As Needed (Potassium replacement per admin instructions). - Oral    Linked Group 3:  \"Or\" Linked Group Details        potassium chloride (MICRO-K) CR capsule 20 mEq 20 mEq As Needed 12/16/2017     Sig - Route: Take 2 capsules by mouth As Needed (Potassium replacement per admin instructions). - Oral    Linked Group 4:  \"Or\" Linked Group Details        potassium chloride (MICRO-K) CR capsule 40 mEq 40 mEq As Needed 12/16/2017     Sig - Route: Take 4 capsules by mouth As Needed (Potassium replacement per admin instructions). - Oral    Linked Group 5:  \"Or\" Linked Group Details        potassium chloride 10 mEq in 100 mL IVPB 10 mEq Every 1 Hour PRN 12/16/2017     Sig - Route: Infuse 100 mL into a venous catheter Every 1 (One) Hour As Needed " "(Potassium replacement per admin instructions). - Intravenous    Linked Group 5:  \"Or\" Linked Group Details        potassium chloride 10 mEq in 100 mL IVPB 10 mEq Every 1 Hour PRN 12/16/2017     Sig - Route: Infuse 100 mL into a venous catheter Every 1 (One) Hour As Needed (Potassium replacement per admin instructions). - Intravenous    Linked Group 4:  \"Or\" Linked Group Details        potassium chloride 10 mEq in 100 mL IVPB 10 mEq Every 1 Hour PRN 12/16/2017     Sig - Route: Infuse 100 mL into a venous catheter Every 1 (One) Hour As Needed (Potassium replacement per admin instructions). - Intravenous    Linked Group 3:  \"Or\" Linked Group Details        promethazine (PHENERGAN) injection 12.5 mg 12.5 mg Every 4 Hours PRN 12/17/2017     Sig - Route: Infuse 0.5 mL into a venous catheter Every 4 (Four) Hours As Needed for Nausea or Vomiting. - Intravenous    sodium chloride 0.45 % with KCl 20 mEq/L infusion 250 mL/hr Continuous PRN 12/16/2017     Sig - Route: Infuse 250 mL/hr into a venous catheter Continuous As Needed (After Initial 2 Hours - If Potassium Level is Less Than or Equal to 5 (If Greater Than 5 use 0.45%)). - Intravenous    sodium chloride 0.9 % bolus 1,000 mL 1,000 mL Once 12/16/2017 12/16/2017    Sig - Route: Infuse 1,000 mL into a venous catheter 1 (One) Time. - Intravenous    Cosign for Ordering: Accepted by Darinel Montelongo MD on 12/16/2017  6:29 PM    sodium chloride 0.9 % bolus 1,000 mL 1,000 mL Once 12/16/2017 12/16/2017    Sig - Route: Infuse 1,000 mL into a venous catheter 1 (One) Time. - Intravenous    Cosign for Ordering: Accepted by Darinel Montelongo MD on 12/16/2017  6:29 PM    sodium chloride 0.9 % bolus 1,000 mL 1,000 mL Once 12/16/2017     Sig - Route: Infuse 1,000 mL into a venous catheter 1 (One) Time. - Intravenous    sodium chloride 0.9 % flush 1-10 mL 1-10 mL As Needed 12/16/2017     Sig - Route: Infuse 1-10 mL into a venous catheter As Needed for Line Care. - Intravenous    sodium " chloride 0.9 % flush 10 mL 10 mL As Needed 12/16/2017     Sig - Route: Infuse 10 mL into a venous catheter As Needed for Line Care. - Intravenous    Cosign for Ordering: Accepted by Darinel Montelongo MD on 12/16/2017  6:29 PM    enoxaparin (LOVENOX) syringe 30 mg (Discontinued) 30 mg Every 24 Hours 12/16/2017 12/17/2017    Sig - Route: Inject 0.3 mL under the skin Daily. - Subcutaneous    enoxaparin (LOVENOX) syringe 40 mg (Discontinued) 40 mg Every 24 Hours 12/17/2017 12/17/2017    Sig - Route: Inject 0.4 mL under the skin Daily. - Subcutaneous    insulin regular (humuLIN R,novoLIN R) 100 Units in sodium chloride 0.9 % 100 mL (1 Units/mL) infusion (Discontinued) 0.1 Units/kg/hr × 76.2 kg Titrated 12/16/2017 12/16/2017    Sig - Route: Infuse 7.6 Units/hr into a venous catheter Dose Adjusted By Provider As Needed. - Intravenous    Cosign for Ordering: Accepted by Darinel Montelongo MD on 12/16/2017  6:29 PM    insulin regular (humuLIN R,novoLIN R) 100 Units in sodium chloride 0.9 % 100 mL (1 Units/mL) infusion (Discontinued) 0.1 Units/kg/hr × 76.2 kg Titrated 12/16/2017 12/17/2017    Sig - Route: Infuse 7.6 Units/hr into a venous catheter Dose Adjusted By Provider As Needed. - Intravenous    sodium chloride 0.45 % infusion (Discontinued) 250 mL/hr Continuous 12/16/2017 12/16/2017    Sig - Route: Infuse 250 mL/hr into a venous catheter Continuous. - Intravenous    Cosign for Ordering: Accepted by Darinel Montelongo MD on 12/16/2017  6:29 PM    sodium chloride 0.45 % infusion (Discontinued) 250 mL/hr Continuous 12/16/2017 12/18/2017    Sig - Route: Infuse 250 mL/hr into a venous catheter Continuous. - Intravenous    sodium chloride 0.45 % with KCl 20 mEq/L infusion (Discontinued) 250 mL/hr Continuous PRN 12/16/2017 12/16/2017    Sig - Route: Infuse 250 mL/hr into a venous catheter Continuous As Needed (After Initial 2 Hours - If Potassium Level is Less Than or Equal to 5 (If Greater Than 5 use 0.45%)). - Intravenous    Cosign  for Ordering: Accepted by Darinel Montelongo MD on 12/16/2017  6:29 PM    sodium chloride 0.9 % infusion (Discontinued) 9 mL/hr Continuous 12/16/2017 12/18/2017    Sig - Route: Infuse 9 mL/hr into a venous catheter Continuous. - Intravenous          Orders (last 24 hrs)     Start     Ordered    12/19/17 0430  Basic Metabolic Panel  Morning Draw      12/18/17 0931    12/19/17 0430  Phosphorus  Morning Draw      12/18/17 0931    12/18/17 1707  Auto Discontinue in 48 Hours if not Collected  ONCE CDIFF      12/16/17 1706    12/18/17 1131  Diet Soft Texture; [employer]; Consistent Carbohydrate, GI Soft / [place]  Diet Effective Now      12/18/17 1130    12/18/17 1116  POC Glucose Once  Once      12/18/17 1115    12/18/17 0956  Basic Metabolic Panel  Once      12/18/17 0955    12/18/17 0940  Transfer Patient  Once      12/18/17 0940    12/18/17 0937  recheck chemistries today.  start ambulating the patient and advance her diet.  Stop all IV fluids and IV insulin.   Transfer out of CCU today.  Nursing Communication  Once     Comments:  recheck chemistries today.  start ambulating the patient and advance her diet.  Stop all IV fluids and IV insulin.   Transfer out of CCU today.    12/18/17 0936    12/18/17 0723  POC Glucose Once  Once      12/18/17 0722    12/18/17 0600  CBC (No Diff)  Daily      12/17/17 1210    12/18/17 0600  Glutamic Acid Decarboxylase  Morning Draw      12/17/17 1520    12/18/17 0600  C-Peptide  Morning Draw      12/17/17 1520    12/18/17 0600  Lipid Panel  Morning Draw      12/17/17 1529    12/18/17 0414  POC Glucose Once  Once      12/18/17 0413    12/18/17 0210  ALPRAZolam (XANAX) tablet 0.5 mg  2 Times Daily PRN      12/18/17 0210    12/18/17 0002  POC Glucose Once  Once      12/18/17 0001    12/17/17 2100  insulin detemir (LEVEMIR) injection 80 Units  Nightly      12/17/17 1242    12/17/17 2100  enoxaparin (LOVENOX) syringe 40 mg  Every 24 Hours      12/17/17 1619    12/17/17 2049  POC Glucose Once   Once      12/17/17 2048    12/17/17 1930  enoxaparin (LOVENOX) syringe 40 mg  Every 24 Hours,   Status:  Discontinued      12/17/17 1210    12/17/17 1800  insulin aspart (novoLOG) injection 0-24 Units  4 Times Daily With Meals & Nightly      12/17/17 1618 12/17/17 1700  POC Glucose 4x Daily AC & at Bedtime  4 Times Daily Before Meals & at Bedtime      12/17/17 1618    12/17/17 1616  Do NOT Hold Basal Insulin When Patient is NPO, Hold Bolus Dose if NPO  Continuous      12/17/17 1618    12/17/17 1616  Follow [facility] Hypoglycemia Protocol For Blood Glucose Less Than 70 mg/dL  Until Discontinued      12/17/17 1618 12/17/17 1616  Hypoglycemia Treatment - Alert Patient That is Not NPO and Can Safely Swallow  Until Discontinued     Comments:  Administer 4 oz Fruit Juice OR 4 oz Regular Soda OR 8 oz Milk OR 15-30 grams (1 tube) of Glucose Gel  Recheck Blood Glucose 15 Minutes After Ingestion, Repeat Treatment & Continue to Recheck Blood Sugar Every 15 Minutes Until Blood Glucose is 70 or Higher  Once Blood Glucose is 70 or Higher, Give Snack (Peanut Butter & Crackers) if Next Meal Will Be Given in More Than 60 Minutes, Provide Meal Tray As Soon As Possible    12/17/17 1618    12/17/17 1616  Hypoglycemia Treatment - Patient Has IV Access - Unresponsive, NPO or Unable To Safely Swallow  Until Discontinued     Comments:  Administer 25g D50W IV Push (1 Whole Vial)  Recheck Blood Glucose 15 Minutes After Administration, if Blood Glucose Remains Less Than 70, Repeat Treatment   Recheck Blood Glucose 15 Minutes After 2nd Administration, if Blood Glucose Remains Less Than 70 After 2nd Dose of D50W Contact Provider for Further Treatment Orders & Consider Adding IVF With D5 for Maintenance    12/17/17 1618    12/17/17 1616  Hypoglycemia Treatment - Patient Without IV Access - Unresponsive, NPO or Unable To Safely Swallow  Until Discontinued     Comments:  Administer 1mg Glucagon SQ & Establish IV Access  Turn Patient on Side -  Nausea / Vomiting May Occur  Recheck Blood Glucose 15 Minutes After Administration  If IV Access Has Not Been Established & Blood Glucose Remains Less Than 70, Repeat Treatment With Glucagon  If IV Access Established, Administer 25g D50W IV Push (1 Whole Vial)  Recheck Blood Glucose 15 Minutes After Administration of 2nd Medication, if Blood Glucose Remains Less Than 70, Contact Provider for Further Treatment Orders & Consider Adding IVF With D5 for Maintenance    12/17/17 1618    12/17/17 1615  dextrose (GLUTOSE) oral gel 15 g  Every 15 Minutes PRN      12/17/17 1618    12/17/17 1615  dextrose (D50W) solution 25 g  Every 15 Minutes PRN      12/17/17 1618    12/17/17 1615  glucagon (human recombinant) (GLUCAGEN DIAGNOSTIC) injection 1 mg  Every 15 Minutes PRN      12/17/17 1618    12/17/17 1600  POC Glucose Once  Once      12/17/17 1559    12/17/17 1540  CT Abdomen Pelvis Without Contrast  1 Time Imaging      12/17/17 1540    12/17/17 1519  Diet Full Liquid  Diet Effective Now,   Status:  Canceled      12/17/17 1520    12/17/17 1517  Microalbumin / Creatinine Urine Ratio -  Once      12/17/17 1520    12/17/17 1239  promethazine (PHENERGAN) injection 12.5 mg  Every 4 Hours PRN      12/17/17 1242    12/17/17 0800  pantoprazole (PROTONIX) EC tablet 40 mg  Every Early Morning      12/17/17 0729    12/17/17 0400  Calcium, Ionized  Every 4 Hours,   Status:  Canceled      12/17/17 0353    12/17/17 0316  morphine injection 1 mg  Every 4 Hours PRN      12/17/17 0317    12/17/17 0316  morphine injection 2 mg  Every 4 Hours PRN      12/17/17 0317    12/16/17 2300  sodium chloride 0.9 % infusion  Continuous,   Status:  Discontinued      12/16/17 2216 12/16/17 2125  sodium chloride 0.45 % infusion  Continuous,   Status:  Discontinued      12/16/17 1925 12/16/17 2036  Influenza Vac Subunit Quad (FLUCELVAX) injection 0.5 mL  During Hospitalization      12/16/17 2036 12/16/17 2000  Phosphorus  Every 4 Hours,   Status:   Canceled     Comments:  After 24 hours, change frequency to q 6 hours until DKA resolved.     12/16/17 1748 12/16/17 2000  Basic Metabolic Panel  Every 4 Hours,   Status:  Canceled     Comments:  After 24 hours, change frequency to q 6 hours until DKA resolved.     If Potassium is greater than 5mEq/L, obtain BMP every 2 hours until less than 5 mEq/L.    12/16/17 1748 12/16/17 2000  Magnesium  Every 4 Hours,   Status:  Canceled     Comments:  After 24 hours, change frequency to q 6 hours until DKA resolved.     12/16/17 1748 12/16/17 2000  POC Glucose Q1H  Every Hour,   Status:  Canceled      12/16/17 1925 12/16/17 2000  Calcium, Ionized  Every 4 Hours,   Status:  Canceled     Comments:  After 24 hours, change frequency to q 6 hours until DKA resolved.    12/16/17 1925 12/16/17 1927  sodium chloride 0.9 % bolus 1,000 mL  Once      12/16/17 1925 12/16/17 1927  insulin regular (humuLIN R,novoLIN R) injection 7.6 Units  Once      12/16/17 1925 12/16/17 1927  insulin regular (humuLIN R,novoLIN R) 100 Units in sodium chloride 0.9 % 100 mL (1 Units/mL) infusion  Titrated,   Status:  Discontinued      12/16/17 1925 12/16/17 1926  fluconazole (DIFLUCAN) IVPB 100 mg in 50mL NaCl  Daily      12/16/17 1924 12/16/17 1924  sodium chloride 0.45 % with KCl 20 mEq/L infusion  Continuous PRN      12/16/17 1925 12/16/17 1924  dextrose 5 % and sodium chloride 0.45 % infusion  Continuous PRN      12/16/17 1925 12/16/17 1924  dextrose 5 % and sodium chloride 0.45 % with KCl 20 mEq/L infusion  Continuous PRN      12/16/17 1925 12/16/17 1924  potassium chloride (MICRO-K) CR capsule 40 mEq  As Needed      12/16/17 1925 12/16/17 1924  potassium chloride (KLOR-CON) packet 40 mEq  As Needed      12/16/17 1925 12/16/17 1924  potassium chloride 10 mEq in 100 mL IVPB  Every 1 Hour PRN      12/16/17 1925 12/16/17 1924  potassium chloride (MICRO-K) CR capsule 20 mEq  As Needed      12/16/17 1925     12/16/17 1924  potassium chloride (KLOR-CON) packet 20 mEq  As Needed      12/16/17 1925 12/16/17 1924  potassium chloride 10 mEq in 100 mL IVPB  Every 1 Hour PRN      12/16/17 1925 12/16/17 1924  potassium chloride (MICRO-K) CR capsule 10 mEq  As Needed      12/16/17 1925 12/16/17 1924  potassium chloride (KLOR-CON) packet 10 mEq  As Needed      12/16/17 1925 12/16/17 1924  potassium chloride 10 mEq in 100 mL IVPB  Every 1 Hour PRN      12/16/17 1925 12/16/17 1924  insulin regular (humuLIN R,novoLIN R) injection 7.6 Units  Once As Needed      12/16/17 1925 12/16/17 1924  dextrose (D50W) solution 12.5 g  Every 15 Minutes PRN      12/16/17 1925 12/16/17 1922  sodium chloride 0.9 % flush 1-10 mL  As Needed      12/16/17 1923 12/16/17 1922  ondansetron (ZOFRAN) tablet 4 mg  Every 6 Hours PRN      12/16/17 1923 12/16/17 1922  ondansetron ODT (ZOFRAN-ODT) disintegrating tablet 4 mg  Every 6 Hours PRN      12/16/17 1923 12/16/17 1922  ondansetron (ZOFRAN) injection 4 mg  Every 6 Hours PRN      12/16/17 1923 12/16/17 1800  POC Glucose Q1H  Every Hour,   Status:  Canceled      12/16/17 1748 12/16/17 1746  insulin regular (humuLIN R,novoLIN R) injection 7.6 Units  Once As Needed      12/16/17 1748 12/16/17 1746  dextrose (D50W) solution 12.5 g  Every 15 Minutes PRN      12/16/17 1748    12/16/17 1746  dextrose 5 % and sodium chloride 0.45 % infusion  Continuous PRN      12/16/17 1748    12/16/17 1746  dextrose 5 % and sodium chloride 0.45 % with KCl 20 mEq/L infusion  Continuous PRN      12/16/17 1748    12/16/17 1639  sodium chloride 0.9 % flush 10 mL  As Needed      12/16/17 1639    Unscheduled  ECG 12 Lead  As Needed     Comments:  For standing ICU/CCU Standing Orders.  Nurse to Release if Patient Experiences Acute Chest Pain or Dysrhythmias    12/16/17 1923    Unscheduled  Potassium  As Needed     Comments:  Sudden Ventricular Dysrhythmias.   Notify Physician.    12/16/17 1923     Unscheduled  Magnesium  As Needed     Comments:  For ICU/CCU Standing Orders    12/16/17 1923    --  metFORMIN (GLUCOPHAGE) 1000 MG tablet  2 Times Daily With Meals      12/16/17 1925    --  insulin glargine (LANTUS) 100 UNIT/ML injection  Nightly      12/16/17 1925    --  insulin aspart (novoLOG) 100 UNIT/ML injection  3 Times Daily Before Meals      12/16/17 1925    --  insulin aspart (novoLOG) 100 UNIT/ML injection      12/16/17 1925    Signed and Held  ALPRAZolam (XANAX) tablet 0.5 mg  2 Times Daily PRN      Signed and Held    Signed and Held  cholecalciferol (VITAMIN D3) tablet 1,000 Units  Daily      Signed and Held    Signed and Held  cycloSPORINE (RESTASIS) 0.05 % ophthalmic emulsion 1 drop  2 Times Daily      Signed and Held    Signed and Held  DULoxetine (CYMBALTA) DR capsule 90 mg  Daily      Signed and Held    Signed and Held  insulin detemir (LEVEMIR) injection 80 Units  Every Morning      Signed and Held    Signed and Held  levETIRAcetam (KEPPRA) tablet 500 mg  2 Times Daily      Signed and Held    Signed and Held  oxyCODONE (ROXICODONE) immediate release tablet 5 mg  Every 8 Hours PRN      Signed and Held    Signed and Held  pantoprazole (PROTONIX) EC tablet 40 mg  Daily      Signed and Held    Signed and Held  pregabalin (LYRICA) capsule 75 mg  2 Times Daily      Signed and Held             Physician Progress Notes (last 72 hours) (Notes from 12/15/2017  1:46 PM through 12/18/2017  1:46 PM)      Juan Martinez MD at 12/17/2017  9:20 AM  Version 1 of 1           Dr. ANATOLIY Martinez    [facility]    12/17/2017    Patient ID:  Name:  Silvia Zabala  MRN:  [id number]  1960  57 y.o.  female            CC/Reason for visit: Follow-up for DKA    HPI: Patient is feeling better today.  No nausea or vomiting since admission    Vitals:  Vitals:    12/17/17 0505 12/17/17 0605 12/17/17 0700 12/17/17 0705   BP: 138/78 128/87  136/87   BP Location:       Patient Position:       Pulse: 96  93  93   Resp:       Temp:   98.6 °F (37 °C)    TempSrc:   Oral    SpO2: 96% 97%  98%   Weight:       Height:               Body mass index is 26.52 kg/(m^2).    Intake/Output Summary (Last 24 hours) at 12/17/17 0921  Last data filed at 12/17/17 0607   Gross per 24 hour   Intake           2065.8 ml   Output             1650 ml   Net            415.8 ml       Exam:  GEN:  No distress, appears stated age  EYES:   EOM-i, anicteric sclera bilat  ENT:    External ears/nose normal, OP clear  NECK:  Supple, midline trachea  LUNGS: Clear breath sounds bilat, nonlabored breathing  CV:  Normal S1S2, without murmur, no edema  ABD:  Non tender, no enlarged liver or masses  EXT:  No cyanosis or clubbing    Scheduled meds:    enoxaparin 30 mg Subcutaneous Q24H   fluconazole 100 mg Intravenous Daily   insulin regular 0.1 Units/kg Intravenous Once   pantoprazole 40 mg Oral Q AM   sodium chloride 1,000 mL Intravenous Once     IV meds:                        dextrose 5 % and sodium chloride 0.45 % 150 mL/hr    dextrose 5 % and sodium chloride 0.45 % 150 mL/hr    dextrose 5 % and sodium chloride 0.45 % with KCl 20 mEq/L 150 mL/hr    dextrose 5 % and sodium chloride 0.45 % with KCl 20 mEq/L 150 mL/hr    insulin regular infusion 1 unit/mL 0.1 Units/kg/hr Last Rate: 4.8 Units/hr (12/17/17 0700)   sodium chloride 250 mL/hr    sodium chloride 0.45 % with KCl 20 mEq 250 mL/hr Last Rate: 250 mL/hr (12/17/17 0715)   sodium chloride 9 mL/hr Last Rate: 9 mL/hr (12/16/17 2324)       Data Review:   I reviewed the patient's medications and new clinical results.      Results from last 7 days  Lab Units 12/17/17  0606 12/17/17  0401 12/17/17  0011 12/16/17  2036 12/16/17  1839 12/16/17  1650   SODIUM mmol/L  --  137 135*  --  126* 119*   POTASSIUM mmol/L 3.9 3.8 3.7  --  4.1 6.0*   CHLORIDE mmol/L  --  102 98  --  85* 78*   CO2 mmol/L  --  21.1* 23.0  --  18.2* 18.4*   BUN mg/dL  --  9 11  --  16 16   CREATININE mg/dL  --  0.69 0.76  --  1.11* 1.19*    CALCIUM mg/dL  --  8.2* 8.6  --  8.5* 9.2   BILIRUBIN mg/dL  --   --   --   --   --  1.2   ALK PHOS U/L  --   --   --   --   --  174*   ALT (SGPT) U/L  --   --   --   --   --  34*   AST (SGOT) U/L  --   --   --   --   --  26   GLUCOSE mg/dL  --  287* 443* 717* 842* 981*   WBC 10*3/mm3  --   --   --   --   --  5.63   HEMOGLOBIN g/dL  --   --   --   --   --  14.0   PLATELETS 10*3/mm3  --   --   --   --   --  124*             Results from last 7 days  Lab Units 12/16/17  1809   PH, ARTERIAL pH units 7.454*   PCO2, ARTERIAL mm Hg 24.2*   PO2 ART mm Hg 76.9*   MODALITY  Room Air   O2 SATURATION CALC % 96.2           ASSESSMENT:   Active Problems:    Diabetic ketoacidosis without coma associated with type 2 diabetes mellitus  Candida esophagitis causing nausea vomiting  Hypophosphatemia  Acute kidney injury  Hyponatremia  Nonalcoholic steatohepatitis  Portal hypertension      PLAN:  Continue DKA protocol.  Start feeding the patient today.  Start ambulating the patient today.  Transfer out of CCU later today if appropriate.        Juan Martinez MD  12/17/2017     Electronically signed by Juan Martienz MD at 12/17/2017  9:23 AM      Juan Martienz MD at 12/18/2017  9:29 AM  Version 1 of 1           Dr. ANATOLIY Martinez    [facility]    12/18/2017    Patient ID:  Name:  Silvia Zabala  MRN:  [id number]  1960  57 y.o.  female            CC/Reason for visit: Follow-up for DKA    HPI: No vomiting.  Nausea is improving.  Fewer abdominal cramps and no diarrhea.    Vitals:  Vitals:    12/18/17 0505 12/18/17 0610 12/18/17 0705 12/18/17 0735   BP: 122/67 145/87 112/61    BP Location:       Patient Position:       Pulse: 93 92 92    Resp:       Temp:    98.8 °F (37.1 °C)   TempSrc:       SpO2: 96% 100% 97%    Weight:       Height:               Body mass index is 27.14 kg/(m^2).    Intake/Output Summary (Last 24 hours) at 12/18/17 0979  Last data filed at 12/18/17 0853   Gross per 24 hour   Intake            6131.1 ml   Output             1500 ml   Net           4631.1 ml       Exam:  GEN:  No distress, appears stated age  EYES:   EOM-i, anicteric sclera bilat  ENT:    External ears/nose normal, OP clear  NECK:  Supple, midline trachea  LUNGS: Clear breath sounds bilat, nonlabored breathing  CV:  Normal S1S2, without murmur, no edema  ABD:  Non tender, no enlarged liver or masses  EXT:  No cyanosis or clubbing    Scheduled meds:    enoxaparin 40 mg Subcutaneous Q24H   fluconazole 100 mg Intravenous Daily   insulin aspart 0-24 Units Subcutaneous 4x Daily With Meals & Nightly   insulin detemir 80 Units Subcutaneous Nightly   insulin regular 0.1 Units/kg Intravenous Once   pantoprazole 40 mg Oral Q AM   sodium chloride 1,000 mL Intravenous Once     IV meds:                        dextrose 5 % and sodium chloride 0.45 % 150 mL/hr    dextrose 5 % and sodium chloride 0.45 % 150 mL/hr    dextrose 5 % and sodium chloride 0.45 % with KCl 20 mEq/L 150 mL/hr    dextrose 5 % and sodium chloride 0.45 % with KCl 20 mEq/L 150 mL/hr    sodium chloride 250 mL/hr    sodium chloride 0.45 % with KCl 20 mEq 250 mL/hr Last Rate: 250 mL/hr (12/18/17 0830)   sodium chloride 9 mL/hr Last Rate: 9 mL/hr (12/16/17 9466)       Data Review:   I reviewed the patient's medications and new clinical results.      Results from last 7 days  Lab Units 12/18/17  0405 12/17/17  1023 12/17/17  0606 12/17/17  0401 12/17/17  0011  12/16/17  1650   SODIUM mmol/L  --  138  --  137 135*  < > 119*   POTASSIUM mmol/L  --  4.2 3.9 3.8 3.7  < > 6.0*   CHLORIDE mmol/L  --  105  --  102 98  < > 78*   CO2 mmol/L  --  20.8*  --  21.1* 23.0  < > 18.4*   BUN mg/dL  --  6  --  9 11  < > 16   CREATININE mg/dL  --  0.70  --  0.69 0.76  < > 1.19*   CALCIUM mg/dL  --  8.0*  --  8.2* 8.6  < > 9.2   BILIRUBIN mg/dL  --   --   --   --   --   --  1.2   ALK PHOS U/L  --   --   --   --   --   --  174*   ALT (SGPT) U/L  --   --   --   --   --   --  34*   AST (SGOT) U/L  --   --    --   --   --   --  26   GLUCOSE mg/dL  --  185*  --  287* 443*  < > 981*   WBC 10*3/mm3 3.38*  --   --   --   --   --  5.63   HEMOGLOBIN g/dL 11.3*  --   --   --   --   --  14.0   PLATELETS 10*3/mm3 75*  --   --   --   --   --  124*   < > = values in this interval not displayed.                ASSESSMENT:   Active Problems:    Cirrhosis of liver    Type 2 diabetes mellitus, uncontrolled, with neuropathy    DUKES (nonalcoholic steatohepatitis)    Hyperlipidemia    Gastroparesis    Diabetic ketoacidosis without coma associated with type 2 diabetes mellitus    Diabetic peripheral neuropathy    History of seizure disorder      PLAN:  I visualized images of the CT of the abdomen.  I don't see any abnormalities but official report from radiologist is not yet available.    Otherwise patient is improving.  We will recheck chemistries today.  We will start ambulating the patient and advance her diet.  Stop all IV fluids and IV insulin.  Start home regimen of subcutaneous insulin.  Transfer out of CCU today.        Juan Martinez MD  2017     Electronically signed by Juan Martinez MD at 2017  9:36 AM      Rich Coleman MD at 2017  1:08 PM  Version 1 of 1         [facility]     Inpatient Follow Up Note    Patient Identification:  Name: Silvia Zabala  Age: 57 y.o.  Sex: female  :  1960  MRN: [id number]    Information from:patient    Chief Complaint   Patient presents with   • Vomiting   • Diarrhea       History:   Feels much better. No further abdominal cramping. Tolerating diet so far.     Review of Systems:  Constitutional:  Negative   Cardiovascular:  Negative   Respiratory:  Negative             Current Meds:  MAR Reviewed  Scheduled Meds:  enoxaparin 40 mg Subcutaneous Q24H   fluconazole 100 mg Intravenous Daily   insulin aspart 0-24 Units Subcutaneous 4x Daily With Meals & Nightly   insulin detemir 80 Units Subcutaneous Nightly   insulin regular 0.1  "Units/kg Intravenous Once   pantoprazole 40 mg Oral Q AM   sodium chloride 1,000 mL Intravenous Once     Continuous Infusions:  dextrose 5 % and sodium chloride 0.45 % 150 mL/hr    dextrose 5 % and sodium chloride 0.45 % 150 mL/hr    dextrose 5 % and sodium chloride 0.45 % with KCl 20 mEq/L 150 mL/hr    dextrose 5 % and sodium chloride 0.45 % with KCl 20 mEq/L 150 mL/hr    sodium chloride 0.45 % with KCl 20 mEq 250 mL/hr Last Rate: 250 mL/hr (17 0846)     Physical Exam:  Vitals:   Temp (24hrs), Av.9 °F (37.2 °C), Min:98.3 °F (36.8 °C), Max:99.7 °F (37.6 °C)    Temp:  [98.3 °F (36.8 °C)-99.7 °F (37.6 °C)] 98.3 °F (36.8 °C)  Heart Rate:  [] 94  Resp:  [20] 20  BP: (112-161)/(61-93) 149/87  /87  Pulse 94  Temp 98.3 °F (36.8 °C)  Resp 20  Ht 168.9 cm (66.5\")  Wt 77.4 kg (170 lb 11.2 oz)  SpO2 100%  BMI 27.14 kg/m2    Exam:  NAD  Tardive dyskinesia present  PERRLA. Sclerae and conjunctivae normal  HENT: external inspection normal. Hearing intact.  No respiratory distress. Lungs clear.  Cardiac: no MRG.  Abdomen: bowel sounds active. Soft, non-tender, no HSM.  Presence of edema:no  Alert, oriented, normal affect.         DATA:  Radiology and Labs:   Recent Results (from the past 24 hour(s))   POC Glucose Once    Collection Time: 17  3:58 PM   Result Value Ref Range    Glucose 216 (H) 70 - 130 mg/dL   POC Glucose Once    Collection Time: 17  8:43 PM   Result Value Ref Range    Glucose 198 (H) 70 - 130 mg/dL   POC Glucose Once    Collection Time: 17 11:56 PM   Result Value Ref Range    Glucose 218 (H) 70 - 130 mg/dL   Microalbumin / Creatinine Urine Ratio -    Collection Time: 17 11:58 PM   Result Value Ref Range    Microalbumin/Creatinine Ratio  mg/g    Creatinine, Urine 15.0 mg/dL    Microalbumin, Urine <1.2 mg/L   POC Glucose Once    Collection Time: 17  4:01 AM   Result Value Ref Range    Glucose 116 70 - 130 mg/dL   CBC (No Diff)    Collection Time: 17  " 4:05 AM   Result Value Ref Range    WBC 3.38 (L) 4.50 - 10.70 10*3/mm3    RBC 3.86 (L) 3.90 - 5.20 10*6/mm3    Hemoglobin 11.3 (L) 11.9 - 15.5 g/dL    Hematocrit 33.5 (L) 35.6 - 45.5 %    MCV 86.8 80.5 - 98.2 fL    MCH 29.3 26.9 - 32.0 pg    MCHC 33.7 32.4 - 36.3 g/dL    RDW 15.8 (H) 11.7 - 13.0 %    RDW-SD 49.7 37.0 - 54.0 fl    MPV 10.5 6.0 - 12.0 fL    Platelets 75 (L) 140 - 500 10*3/mm3   Lipid Panel    Collection Time: 12/18/17  4:05 AM   Result Value Ref Range    Total Cholesterol 129 0 - 200 mg/dL    Triglycerides 186 (H) 0 - 150 mg/dL    HDL Cholesterol 22 (L) 40 - 60 mg/dL    LDL Cholesterol  70 0 - 100 mg/dL    VLDL Cholesterol 37.2 5 - 40 mg/dL    LDL/HDL Ratio 3.17    POC Glucose Once    Collection Time: 12/18/17  7:20 AM   Result Value Ref Range    Glucose 111 70 - 130 mg/dL   Basic Metabolic Panel    Collection Time: 12/18/17  9:57 AM   Result Value Ref Range    Glucose 327 (H) 65 - 99 mg/dL    BUN 3 (L) 6 - 20 mg/dL    Creatinine 0.71 0.57 - 1.00 mg/dL    Sodium 135 (L) 136 - 145 mmol/L    Potassium 4.5 3.5 - 5.2 mmol/L    Chloride 106 98 - 107 mmol/L    CO2 17.1 (L) 22.0 - 29.0 mmol/L    Calcium 7.9 (L) 8.6 - 10.5 mg/dL    eGFR Non African Amer 85 >60 mL/min/1.73    BUN/Creatinine Ratio 4.2 (L) 7.0 - 25.0    Anion Gap 11.9 mmol/L   POC Glucose Once    Collection Time: 12/18/17 11:14 AM   Result Value Ref Range    Glucose 329 (H) 70 - 130 mg/dL       Assessment:   Problem List:   Active Problems:    Cirrhosis of liver    Type 2 diabetes mellitus, uncontrolled, with neuropathy    DUKES (nonalcoholic steatohepatitis)    Hyperlipidemia    Gastroparesis    Diabetic ketoacidosis without coma associated with type 2 diabetes mellitus    Diabetic peripheral neuropathy    History of seizure disorder    The CT has not been officially read. She has splenomegaly and a small amount of ascites around the liver. It looks to me like she has enteritis. She is doing better.     Plan:   Await CT results. Would avoid  Reglan. Domperidone might help if available.       Rich Coleman MD  [facility]  12/18/2017         Electronically signed by Rich Coleman MD at 12/18/2017  1:11 PM

## 2017-12-18 NOTE — PROGRESS NOTES
57 y.o.   LOS: 2 days   Patient Care Team:  Blanca Pitts MD as PCP - General (Internal Medicine)  Sukhdeep Mcdowell MD as Consulting Physician (Hematology and Oncology)  Blanca Pitts MD as Referring Physician (Internal Medicine)    Chief Complaint:  Hyperglycemia    Chief Complaint   Patient presents with   • Vomiting   • Diarrhea       Subjective patient tolerating by mouth well.  No further nausea or vomiting episodes.        Interval History:    Review of Systems:   Review of Systems   Constitutional: Positive for appetite change and fatigue.   Gastrointestinal: Negative for vomiting.   Endocrine: Negative for polydipsia and polyuria.   Neurological: Positive for weakness and numbness.     Objective     Vital Signs   Temp:  [98.3 °F (36.8 °C)-99.7 °F (37.6 °C)] 98.3 °F (36.8 °C)  Heart Rate:  [] 101  Resp:  [20] 20  BP: (112-161)/(61-93) 149/87      Physical Exam   Gen exam - alert and oriented x 3,   HEENT - Acanthosis nigricans. Thyroid palpable.   Resp - Clear to auscultation.   CVS - S1,S2 heard and no murmurs.   Abd - Non tender, BS heard.   Ext - No edema     Results Review:     I reviewed the patient's new clinical results.      Glucose   Date/Time Value Ref Range Status   12/18/2017 0957 327 (H) 65 - 99 mg/dL Final   12/17/2017 1023 185 (H) 65 - 99 mg/dL Final   12/17/2017 0401 287 (H) 65 - 99 mg/dL Final   12/17/2017 0011 443 (C) 65 - 99 mg/dL Final   12/16/2017 2036 717 (C) 65 - 99 mg/dL Final   12/16/2017 1839 842 (C) 65 - 99 mg/dL Final   12/16/2017 1650 981 (C) 65 - 99 mg/dL Final     Lab Results (last 72 hours)     Procedure Component Value Units Date/Time    CBC & Differential [[id number]] Collected:  12/16/17 1650    Specimen:  Blood Updated:  12/16/17 1702    Narrative:       The following orders were created for panel order CBC & Differential.  Procedure                               Abnormality         Status                     ---------                               -----------          ------                     CBC Auto Differential[[id number]]        Abnormal            Final result                 Please view results for these tests on the individual orders.    CBC Auto Differential [[id number]]  (Abnormal) Collected:  12/16/17 1650    Specimen:  Blood Updated:  12/16/17 1702     WBC 5.63 10*3/mm3      RBC 4.71 10*6/mm3      Hemoglobin 14.0 g/dL      Hematocrit 42.2 %      MCV 89.6 fL      MCH 29.7 pg      MCHC 33.2 g/dL      RDW 15.3 (H) %      RDW-SD 49.5 fl      MPV 10.7 fL      Platelets 124 (L) 10*3/mm3      Neutrophil % 74.2 %      Lymphocyte % 17.1 (L) %      Monocyte % 6.6 %      Eosinophil % 1.1 %      Basophil % 0.5 %      Immature Grans % 0.5 %      Neutrophils, Absolute 4.18 10*3/mm3      Lymphocytes, Absolute 0.96 10*3/mm3      Monocytes, Absolute 0.37 10*3/mm3      Eosinophils, Absolute 0.06 10*3/mm3      Basophils, Absolute 0.03 10*3/mm3      Immature Grans, Absolute 0.03 10*3/mm3     Lipase [[id number]]  (Normal) Collected:  12/16/17 1650    Specimen:  Blood Updated:  12/16/17 1722     Lipase 22 U/L     Comprehensive Metabolic Panel [[id number]]  (Abnormal) Collected:  12/16/17 1650    Specimen:  Blood Updated:  12/16/17 1741     Glucose 981 (C) mg/dL      BUN 16 mg/dL      Creatinine 1.19 (H) mg/dL      Sodium 119 (C) mmol/L      Potassium 6.0 (H) mmol/L      Chloride 78 (L) mmol/L      CO2 18.4 (L) mmol/L      Calcium 9.2 mg/dL      Total Protein 8.3 g/dL      Albumin 3.90 g/dL      ALT (SGPT) 34 (H) U/L      AST (SGOT) 26 U/L      Alkaline Phosphatase 174 (H) U/L      Total Bilirubin 1.2 mg/dL      eGFR Non African Amer 47 (L) mL/min/1.73      Globulin 4.4 gm/dL      A/G Ratio 0.9 g/dL      BUN/Creatinine Ratio 13.4     Anion Gap 22.6 mmol/L     POC Glucose Once [[id number]]  (Abnormal) Collected:  12/16/17 1737    Specimen:  Blood Updated:  12/16/17 1743     Glucose >599 (C) mg/dL     Narrative:       Treated Patient Meter: [id number] : [id number] Frederic Grewal  Draw [[id number]] Collected:  12/16/17 1650    Specimen:  Blood Updated:  12/16/17 1801    Narrative:       The following orders were created for panel order Spur Draw.  Procedure                               Abnormality         Status                     ---------                               -----------         ------                     Light Blue Top[[id number]]                                   Final result               Green Top (Gel)[[id number]]                                  Final result               Lavender Top[[id number]]                                     Final result               Gold Top - SST[[id number]]                                   Final result                 Please view results for these tests on the individual orders.    Light Blue Top [[id number]] Collected:  12/16/17 1650    Specimen:  Blood Updated:  12/16/17 1801     Extra Tube hold for add-on      Auto resulted       Green Top (Gel) [[id number]] Collected:  12/16/17 1650    Specimen:  Blood Updated:  12/16/17 1801     Extra Tube Hold for add-ons.      Auto resulted.       Lavender Top [[id number]] Collected:  12/16/17 1650    Specimen:  Blood Updated:  12/16/17 1801     Extra Tube hold for add-on      Auto resulted       Gold Top - SST [[id number]] Collected:  12/16/17 1650    Specimen:  Blood Updated:  12/16/17 1801     Extra Tube Hold for add-ons.      Auto resulted.       Blood Gas, Arterial [[id number]]  (Abnormal) Collected:  12/16/17 1809    Specimen:  Arterial Blood Updated:  12/16/17 1811     Site Arterial: right brachial     Mayur's Test N/A     pH, Arterial 7.454 (H) pH units      pCO2, Arterial 24.2 (L) mm Hg      pO2, Arterial 76.9 (L) mm Hg      HCO3, Arterial 17.0 (L) mmol/L      Base Excess, Arterial -5.3 (L) mmol/L      O2 Saturation Calculated 96.2 %      Barometric Pressure for Blood Gas 752.1 mmHg      Modality Room Air     Rate 28 Breaths/minute     Narrative:       % Meter: [id number] : [id number]  Ryley Schumacher    Clostridium Difficile Toxin - Stool, Per Rectum [[id number]] Collected:  12/16/17 1707    Specimen:  Stool from Per Rectum Updated:  12/16/17 1831    Narrative:       The following orders were created for panel order Clostridium Difficile Toxin - Stool, Per Rectum.  Procedure                               Abnormality         Status                     ---------                               -----------         ------                     Clostridium Difficile To...[[id number]]  Normal              Final result                 Please view results for these tests on the individual orders.    Clostridium Difficile Toxin, PCR - Stool, Per Rectum [[id number]]  (Normal) Collected:  12/16/17 1707    Specimen:  Stool from Per Rectum Updated:  12/16/17 1831     C. Difficile Toxins by PCR Negative    Urinalysis With / Culture If Indicated - Urine, Clean Catch [[id number]]  (Abnormal) Collected:  12/16/17 1823    Specimen:  Urine from Urine, Clean Catch Updated:  12/16/17 1851     Color, UA Yellow     Appearance, UA Clear     pH, UA 6.0     Specific Gravity, UA >=1.030     Glucose, UA >=1000 mg/dL (3+) (A)     Ketones, UA 15 mg/dL (1+) (A)     Bilirubin, UA Negative     Blood, UA Negative     Protein, UA Negative     Leuk Esterase, UA Negative     Nitrite, UA Negative     Urobilinogen, UA 0.2 E.U./dL    Narrative:       Urine microscopic not indicated.    POC Glucose Once [[id number]]  (Abnormal) Collected:  12/16/17 1855    Specimen:  Blood Updated:  12/16/17 1856     Glucose >599 (C) mg/dL     Narrative:       Confirmed by Repeat Meter: [id number] : [id number] Reji Li RN    Phosphorus [[id number]]  (Abnormal) Collected:  12/16/17 1839    Specimen:  Blood Updated:  12/16/17 1909     Phosphorus 1.6 (L) mg/dL     Magnesium [[id number]]  (Normal) Collected:  12/16/17 1839    Specimen:  Blood Updated:  12/16/17 1909     Magnesium 1.9 mg/dL     Calcium, Ionized [[id number]]  (Normal) Collected:  12/16/17 1839     Specimen:  Blood Updated:  12/16/17 1913     Ionized Calcium 1.16 mmol/L      Ionized Calcium 4.6 mg/dL     Beta Hydroxybutyrate Quantitative [[id number]]  (Abnormal) Collected:  12/16/17 1839    Specimen:  Blood Updated:  12/16/17 1922     Beta-Hydroxybutyrate Quant 1.610 (H) mmol/L     Basic Metabolic Panel [[id number]]  (Abnormal) Collected:  12/16/17 1839    Specimen:  Blood Updated:  12/16/17 1922     Glucose 842 (C) mg/dL      BUN 16 mg/dL      Creatinine 1.11 (H) mg/dL      Sodium 126 (L) mmol/L      Potassium 4.1 mmol/L      Chloride 85 (L) mmol/L      CO2 18.2 (L) mmol/L      Calcium 8.5 (L) mg/dL      eGFR Non African Amer 51 (L) mL/min/1.73      BUN/Creatinine Ratio 14.4     Anion Gap 22.8 mmol/L     Narrative:       GFR Normal >60  Chronic Kidney Disease <60  Kidney Failure <15    Hemoglobin A1c [[id number]]  (Abnormal) Collected:  12/16/17 1650    Specimen:  Blood Updated:  12/16/17 1941     Hemoglobin A1C 11.20 (H) %     Narrative:       Hemoglobin A1C Ranges:    Increased Risk for Diabetes  5.7% to 6.4%  Diabetes                     >= 6.5%  Diabetic Goal                < 7.0%    POC Glucose Once [[id number]]  (Abnormal) Collected:  12/16/17 2005    Specimen:  Blood Updated:  12/16/17 2007     Glucose >599 (C) mg/dL     Narrative:       Treated Patient Meter: [id number] : [id number] Moisés Parisi    POC Glucose Once [[id number]]  (Abnormal) Collected:  12/16/17 2012    Specimen:  Blood Updated:  12/16/17 2016     Glucose >599 (C) mg/dL     Narrative:       Confirmed by Repeat Meter: [id number] : [id number] Marco Antonio SCANLON    POC Glucose Once [[id number]]  (Abnormal) Collected:  12/16/17 2028    Specimen:  Blood Updated:  12/16/17 2039     Glucose >599 (C) mg/dL     Narrative:       Confirmed by Repeat Meter: [id number] : [id number] Shoshana Pool    Glucose, Random [[id number]]  (Abnormal) Collected:  12/16/17 2036    Specimen:  Blood Updated:  12/16/17 2110     Glucose 717 (C) mg/dL      Calcium, Ionized [[id number]]  (Normal) Collected:  12/16/17 2036    Specimen:  Blood Updated:  12/16/17 2113     Ionized Calcium 1.18 mmol/L      Ionized Calcium 4.7 mg/dL     POC Glucose Once [[id number]]  (Abnormal) Collected:  12/16/17 2212    Specimen:  Blood Updated:  12/16/17 2223     Glucose 570 (C) mg/dL     Narrative:       Meter: [id number] : [id number] Jarrell PCA    POC Glucose Once [[id number]]  (Abnormal) Collected:  12/16/17 2309    Specimen:  Blood Updated:  12/16/17 2320     Glucose 516 (C) mg/dL     Narrative:       Meter: [id number] : [id number] Jarrell PCA    POC Glucose Once [[id number]]  (Abnormal) Collected:  12/17/17 0010    Specimen:  Blood Updated:  12/17/17 0018     Glucose 377 (H) mg/dL     Narrative:       Meter: [id number] : [id number] Jarrell PCA    POC Glucose Once [[id number]]  (Abnormal) Collected:  12/17/17 0011    Specimen:  Blood Updated:  12/17/17 0018     Glucose 441 (H) mg/dL     Narrative:       Meter: [id number] : [id number] Jarrell PCA    Phosphorus [[id number]]  (Abnormal) Collected:  12/17/17 0011    Specimen:  Blood Updated:  12/17/17 0044     Phosphorus 1.4 (L) mg/dL     Magnesium [[id number]]  (Normal) Collected:  12/17/17 0011    Specimen:  Blood Updated:  12/17/17 0044     Magnesium 1.8 mg/dL     Calcium, Ionized [[id number]]  (Normal) Collected:  12/17/17 0011    Specimen:  Blood Updated:  12/17/17 0048     Ionized Calcium 1.19 mmol/L      Ionized Calcium 4.8 mg/dL     Basic Metabolic Panel [[id number]]  (Abnormal) Collected:  12/17/17 0011    Specimen:  Blood Updated:  12/17/17 0053     Glucose 443 (C) mg/dL      BUN 11 mg/dL      Creatinine 0.76 mg/dL      Sodium 135 (L) mmol/L      Potassium 3.7 mmol/L      Chloride 98 mmol/L      CO2 23.0 mmol/L      Calcium 8.6 mg/dL      eGFR Non African Amer 79 mL/min/1.73      BUN/Creatinine Ratio 14.5     Anion Gap 14.0 mmol/L     Narrative:       GFR Normal >60  Chronic Kidney Disease  <60  Kidney Failure <15    POC Glucose Once [[id number]]  (Abnormal) Collected:  12/17/17 0100    Specimen:  Blood Updated:  12/17/17 0120     Glucose 365 (H) mg/dL     Narrative:       Meter: [id number] : [id number] NOE    POC Glucose Once [[id number]]  (Abnormal) Collected:  12/17/17 0210    Specimen:  Blood Updated:  12/17/17 0219     Glucose 335 (H) mg/dL     Narrative:       Meter: [id number] : [id number] Tino Mckinney    POC Glucose Once [[id number]]  (Abnormal) Collected:  12/17/17 0307    Specimen:  Blood Updated:  12/17/17 0332     Glucose 323 (H) mg/dL     Narrative:       Meter: [id number] : [id number] Tino Mckinney    POC Glucose Once [[id number]]  (Abnormal) Collected:  12/17/17 0403    Specimen:  Blood Updated:  12/17/17 0408     Glucose 300 (H) mg/dL     Narrative:       Meter: [id number] : [id number] NOE    Phosphorus [[id number]]  (Abnormal) Collected:  12/17/17 0401    Specimen:  Blood Updated:  12/17/17 0452     Phosphorus 1.7 (L) mg/dL     Basic Metabolic Panel [[id number]]  (Abnormal) Collected:  12/17/17 0401    Specimen:  Blood Updated:  12/17/17 0452     Glucose 287 (H) mg/dL      BUN 9 mg/dL      Creatinine 0.69 mg/dL      Sodium 137 mmol/L      Potassium 3.8 mmol/L      Chloride 102 mmol/L      CO2 21.1 (L) mmol/L      Calcium 8.2 (L) mg/dL      eGFR Non African Amer 88 mL/min/1.73      BUN/Creatinine Ratio 13.0     Anion Gap 13.9 mmol/L     Narrative:       GFR Normal >60  Chronic Kidney Disease <60  Kidney Failure <15    Magnesium [[id number]]  (Normal) Collected:  12/17/17 0401    Specimen:  Blood Updated:  12/17/17 0452     Magnesium 1.7 mg/dL     Calcium, Ionized [[id number]]  (Normal) Collected:  12/17/17 0401    Specimen:  Blood Updated:  12/17/17 0516     Ionized Calcium 1.16 mmol/L      Ionized Calcium 4.6 mg/dL     POC Glucose Once [[id number]]  (Abnormal) Collected:  12/17/17 0501    Specimen:  Blood Updated:  12/17/17 0522     Glucose  272 (H) mg/dL     Narrative:       Meter: [id number] : [id number] Gerson Jarrell PCA    POC Glucose Once [[id number]]  (Abnormal) Collected:  12/17/17 0602    Specimen:  Blood Updated:  12/17/17 0622     Glucose 247 (H) mg/dL     Narrative:       Meter: [id number] : [id number] Gerson Jarrell PCA    Potassium [[id number]]  (Normal) Collected:  12/17/17 0606    Specimen:  Blood Updated:  12/17/17 0652     Potassium 3.9 mmol/L     POC Glucose Once [[id number]]  (Abnormal) Collected:  12/17/17 0659    Specimen:  Blood Updated:  12/17/17 0719     Glucose 259 (H) mg/dL     Narrative:       Meter: [id number] : [id number] Gerson Jarrell PCA    POC Glucose Once [[id number]]  (Abnormal) Collected:  12/17/17 0834    Specimen:  Blood Updated:  12/17/17 0837     Glucose 200 (H) mg/dL     Narrative:       Meter: [id number] : [id number] Derek Goel RN    Calcium, Ionized [[id number]]  (Abnormal) Collected:  12/17/17 0832    Specimen:  Blood Updated:  12/17/17 0915     Ionized Calcium 1.08 (L) mmol/L      Ionized Calcium 4.3 (L) mg/dL     Magnesium [[id number]]  (Normal) Collected:  12/17/17 0832    Specimen:  Blood Updated:  12/17/17 0917     Magnesium 1.7 mg/dL     Phosphorus [[id number]]  (Abnormal) Collected:  12/17/17 0832    Specimen:  Blood Updated:  12/17/17 0917     Phosphorus 1.3 (L) mg/dL     POC Glucose Once [[id number]]  (Abnormal) Collected:  12/17/17 1019    Specimen:  Blood Updated:  12/17/17 1024     Glucose 179 (H) mg/dL     Narrative:       Meter: [id number] : [id number] Derek Goel RN    Giardia / Cryptosporidium Screen - Stool, Per Rectum [[id number]]  (Normal) Collected:  12/16/17 1707    Specimen:  Stool from Per Rectum Updated:  12/17/17 1032     Cryptosporidium Antigen Negative     Giardia Antigen, Stool Negative    Fecal Lactoferrin - Stool, Per Rectum [[id number]]  (Normal) Collected:  12/16/17 1704    Specimen:  Stool from Per Rectum Updated:  12/17/17 1033     Lactoferrin, Qual Negative     Basic Metabolic Panel [[id number]]  (Abnormal) Collected:  12/17/17 1023    Specimen:  Blood Updated:  12/17/17 1102     Glucose 185 (H) mg/dL      BUN 6 mg/dL      Creatinine 0.70 mg/dL      Sodium 138 mmol/L      Potassium 4.2 mmol/L      Chloride 105 mmol/L      CO2 20.8 (L) mmol/L      Calcium 8.0 (L) mg/dL      eGFR Non African Amer 87 mL/min/1.73      BUN/Creatinine Ratio 8.6     Anion Gap 12.2 mmol/L     Narrative:       GFR Normal >60  Chronic Kidney Disease <60  Kidney Failure <15    Fecal Fat, Qualitative - Stool, Per Rectum [[id number]]  (Normal) Collected:  12/16/17 1707    Specimen:  Stool from Per Rectum Updated:  12/17/17 1205     Fecal Fats, Qualitative, Fatty Acids 0-100 fat droplets / hpf     Fecal Fat Qualitative, Neutral Fats 0-50 fat droplets / hpf    POC Glucose Once [[id number]]  (Abnormal) Collected:  12/17/17 1239    Specimen:  Blood Updated:  12/17/17 1240     Glucose 241 (H) mg/dL     Narrative:       Meter: [id number] : [id number] Hong Colt    POC Glucose Once [[id number]]  (Abnormal) Collected:  12/17/17 1558    Specimen:  Blood Updated:  12/17/17 1559     Glucose 216 (H) mg/dL     Narrative:       Meter: [id number] : [id number] Hong Colt    POC Glucose Once [[id number]]  (Abnormal) Collected:  12/17/17 2043    Specimen:  Blood Updated:  12/17/17 2048     Glucose 198 (H) mg/dL     Narrative:       Meter: [id number] : [id number] NOE    POC Glucose Once [[id number]]  (Abnormal) Collected:  12/17/17 2356    Specimen:  Blood Updated:  12/18/17 0001     Glucose 218 (H) mg/dL     Narrative:       Meter: [id number] : [id number] NOE    Microalbumin / Creatinine Urine Ratio - [[id number]] Collected:  12/17/17 2358    Specimen:  Urine Updated:  12/18/17 0123     Microalbumin/Creatinine Ratio -- mg/g       Unable to calculate        Creatinine, Urine 15.0 mg/dL      Microalbumin, Urine <1.2 mg/L     POC Glucose Once [[id number]]  (Normal) Collected:  12/18/17  0401    Specimen:  Blood Updated:  12/18/17 0413     Glucose 116 mg/dL     Narrative:       Meter: [id number] : [id number] Gerson NOE    Glutamic Acid Decarboxylase [[id number]] Collected:  12/18/17 0405    Specimen:  Blood Updated:  12/18/17 0431    C-Peptide [[id number]] Collected:  12/18/17 0405    Specimen:  Blood Updated:  12/18/17 0431    CBC (No Diff) [[id number]]  (Abnormal) Collected:  12/18/17 0405    Specimen:  Blood Updated:  12/18/17 0454     WBC 3.38 (L) 10*3/mm3      RBC 3.86 (L) 10*6/mm3      Hemoglobin 11.3 (L) g/dL      Hematocrit 33.5 (L) %      MCV 86.8 fL      MCH 29.3 pg      MCHC 33.7 g/dL      RDW 15.8 (H) %      RDW-SD 49.7 fl      MPV 10.5 fL      Platelets 75 (L) 10*3/mm3     Lipid Panel [[id number]]  (Abnormal) Collected:  12/18/17 0405    Specimen:  Blood Updated:  12/18/17 0504     Total Cholesterol 129 mg/dL      Triglycerides 186 (H) mg/dL      HDL Cholesterol 22 (L) mg/dL      LDL Cholesterol  70 mg/dL      VLDL Cholesterol 37.2 mg/dL      LDL/HDL Ratio 3.17    Narrative:       Cholesterol Reference Ranges  (U.S. Department of Health and Human Services ATP III Classifications)    Desirable          <200 mg/dL  Borderline High    200-239 mg/dL  High Risk          >240 mg/dL      Triglyceride Reference Ranges  (U.S. Department of Health and Human Services ATP III Classifications)    Normal           <150 mg/dL  Borderline High  150-199 mg/dL  High             200-499 mg/dL  Very High        >500 mg/dL    HDL Reference Ranges  (U.S. Department of Health and Human Services ATP III Classifcations)    Low     <40 mg/dl (major risk factor for CHD)  High    >60 mg/dl ('negative' risk factor for CHD)        LDL Reference Ranges  (U.S. Department of Health and Human Services ATP III Classifcations)    Optimal          <100 mg/dL  Near Optimal     100-129 mg/dL  Borderline High  130-159 mg/dL  High             160-189 mg/dL  Very High        >189 mg/dL    POC Glucose Once [[id number]]   (Normal) Collected:  12/18/17 0720    Specimen:  Blood Updated:  12/18/17 0722     Glucose 111 mg/dL     Narrative:       Meter: [id number] : [id number] Geni Rockwell    Stool Culture With Yersinia - Stool, Per Rectum [[id number]] Collected:  12/16/17 1707    Specimen:  Stool from Per Rectum Updated:  12/18/17 1021     Stool Cx w/ Yersinia --      Heavy growth (4+) Normal Fecal Sonali    Basic Metabolic Panel [[id number]]  (Abnormal) Collected:  12/18/17 0957    Specimen:  Blood Updated:  12/18/17 1058     Glucose 327 (H) mg/dL      BUN 3 (L) mg/dL      Creatinine 0.71 mg/dL      Sodium 135 (L) mmol/L      Potassium 4.5 mmol/L      Chloride 106 mmol/L      CO2 17.1 (L) mmol/L      Calcium 7.9 (L) mg/dL      eGFR Non African Amer 85 mL/min/1.73      BUN/Creatinine Ratio 4.2 (L)     Anion Gap 11.9 mmol/L     Narrative:       GFR Normal >60  Chronic Kidney Disease <60  Kidney Failure <15    POC Glucose Once [[id number]]  (Abnormal) Collected:  12/18/17 1114    Specimen:  Blood Updated:  12/18/17 1115     Glucose 329 (H) mg/dL     Narrative:       Meter: [id number] : [id number] Geni Rockwell        Imaging Results (last 72 hours)     Procedure Component Value Units Date/Time    CT Abdomen Pelvis Without Contrast [[id number]] Updated:  12/17/17 1616          Medication Review: done      Current Facility-Administered Medications:   •  ALPRAZolam (XANAX) tablet 0.5 mg, 0.5 mg, Oral, BID PRN, Abdirizak Vasquez MD, 0.5 mg at 12/18/17 0222  •  dextrose (D50W) solution 12.5 g, 12.5 g, Intravenous, Q15 Min PRN, Teena Pang PA-C  •  dextrose (D50W) solution 12.5 g, 12.5 g, Intravenous, Q15 Min PRN, Juan Martinez MD  •  dextrose (D50W) solution 25 g, 25 g, Intravenous, Q15 Min PRN, Juan Martinez MD  •  dextrose (GLUTOSE) oral gel 15 g, 15 g, Oral, Q15 Min PRN, Juan Martinez MD  •  dextrose 5 % and sodium chloride 0.45 % infusion, 150 mL/hr, Intravenous, Continuous PRN, Teena Pang PA-C  •  dextrose 5 %  and sodium chloride 0.45 % infusion, 150 mL/hr, Intravenous, Continuous PRN, Juan Martinez MD  •  dextrose 5 % and sodium chloride 0.45 % with KCl 20 mEq/L infusion, 150 mL/hr, Intravenous, Continuous PRN, Teena Pang PA-C  •  dextrose 5 % and sodium chloride 0.45 % with KCl 20 mEq/L infusion, 150 mL/hr, Intravenous, Continuous PRN, Juanankit Martinez MD  •  enoxaparin (LOVENOX) syringe 40 mg, 40 mg, Subcutaneous, Q24H, Juan H. MD Michelle, 40 mg at 12/17/17 2057  •  fluconazole (DIFLUCAN) IVPB 100 mg in 50mL NaCl, 100 mg, Intravenous, Daily, Juan H. MD Michelle, 100 mg at 12/18/17 0846  •  glucagon (human recombinant) (GLUCAGEN DIAGNOSTIC) injection 1 mg, 1 mg, Subcutaneous, Q15 Min PRN, Juan H. MD Michelle  •  Influenza Vac Subunit Quad (FLUCELVAX) injection 0.5 mL, 0.5 mL, Intramuscular, During Hospitalization, Juan Martinez MD  •  insulin aspart (novoLOG) injection 0-24 Units, 0-24 Units, Subcutaneous, 4x Daily With Meals & Nightly, Juan Martinez MD, 16 Units at 12/18/17 1145  •  insulin aspart (novoLOG) injection 8 Units, 8 Units, Subcutaneous, TID With Meals, Merary Burnett MD  •  insulin detemir (LEVEMIR) injection 80 Units, 80 Units, Subcutaneous, Nightly, Endy Vasquez MD, 80 Units at 12/17/17 1405  •  morphine injection 1 mg, 1 mg, Intravenous, Q4H PRN **OR** morphine injection 2 mg, 2 mg, Intravenous, Q4H PRN, Abdirizak Vasquez MD, 2 mg at 12/18/17 0854  •  ondansetron (ZOFRAN) tablet 4 mg, 4 mg, Oral, Q6H PRN **OR** ondansetron ODT (ZOFRAN-ODT) disintegrating tablet 4 mg, 4 mg, Oral, Q6H PRN **OR** ondansetron (ZOFRAN) injection 4 mg, 4 mg, Intravenous, Q6H PRN, Juan Martinez MD, 4 mg at 12/17/17 1059  •  pantoprazole (PROTONIX) EC tablet 40 mg, 40 mg, Oral, Q AM, Endy Vasquez MD, 40 mg at 12/18/17 0558  •  potassium chloride (MICRO-K) CR capsule 10 mEq, 10 mEq, Oral, PRN **OR** potassium chloride (KLOR-CON) packet 10 mEq, 10 mEq, Oral, PRN **OR** potassium chloride 10 mEq in 100 mL IVPB, 10  mEq, Intravenous, Q1H PRN, Juan Martinez MD  •  potassium chloride (MICRO-K) CR capsule 20 mEq, 20 mEq, Oral, PRN, 20 mEq at 12/17/17 0657 **OR** potassium chloride (KLOR-CON) packet 20 mEq, 20 mEq, Oral, PRN **OR** potassium chloride 10 mEq in 100 mL IVPB, 10 mEq, Intravenous, Q1H PRN, Juan Martinez MD  •  potassium chloride (MICRO-K) CR capsule 40 mEq, 40 mEq, Oral, PRN **OR** potassium chloride (KLOR-CON) packet 40 mEq, 40 mEq, Oral, PRN **OR** potassium chloride 10 mEq in 100 mL IVPB, 10 mEq, Intravenous, Q1H PRN, Juanankit Martinez MD  •  promethazine (PHENERGAN) injection 12.5 mg, 12.5 mg, Intravenous, Q4H PRN, Enyd Vasquez MD, 12.5 mg at 12/18/17 0853  •  sodium chloride 0.45 % with KCl 20 mEq/L infusion, 250 mL/hr, Intravenous, Continuous PRN, Juan Martinez MD, Last Rate: 250 mL/hr at 12/18/17 0846, 250 mL/hr at 12/18/17 0846  •  sodium chloride 0.9 % bolus 1,000 mL, 1,000 mL, Intravenous, Once, Juan Martinez MD  •  sodium chloride 0.9 % flush 1-10 mL, 1-10 mL, Intravenous, PRN, Juan Martinez MD  •  sodium chloride 0.9 % flush 10 mL, 10 mL, Intravenous, PRN, Darinel Montelongo MD    Assessment/Plan     Active Hospital Problems (** Indicates Principal Problem)    Diagnosis Date Noted   • Diabetic peripheral neuropathy [E11.42] 12/17/2017   • History of seizure disorder [Z86.69] 12/17/2017   • Diabetic ketoacidosis without coma associated with type 2 diabetes mellitus [E13.10] 12/16/2017   • Gastroparesis [K31.84] 10/17/2017   • Hyperlipidemia [E78.5]    • DUKES (nonalcoholic steatohepatitis) [K75.81] 06/14/2016   • Type 2 diabetes mellitus, uncontrolled, with neuropathy [E11.40, E11.65] 03/15/2016   • Cirrhosis of liver [K74.60] 03/08/2016      Resolved Hospital Problems    Diagnosis Date Noted Date Resolved   No resolved problems to display.     Type 2 diabetes mellitus-uncontrolled  Will continue levemir 80 units at bedtime  Will start patient on NovoLog 15 units with each meal, will adjust further based  "on patient's blood sugars  Continue NovoLog sliding scale 3 times a day before meals and at bedtime.  Pending tushar 65, islet cell antibody levels.    Gastroparesis  GI on board.    Hyperlipidemia  LDL at range.    Discussed with the patient about the benefits of CGM and reassured her that we would consider this option as outpatient.    Merary Burnett MD.  12/18/17  2:04 PM      EMR Dragon / transcription disclaimer:    \"Dictated utilizing Dragon dictation\".   "

## 2017-12-19 LAB
ANION GAP SERPL CALCULATED.3IONS-SCNC: 13.3 MMOL/L
BACTERIA STL CULT: NORMAL
BACTERIA STL CULT: NORMAL
BUN BLD-MCNC: 3 MG/DL (ref 6–20)
BUN/CREAT SERPL: 4.5 (ref 7–25)
C PEPTIDE SERPL-MCNC: 0.4 NG/ML (ref 1.1–4.4)
CALCIUM SPEC-SCNC: 8.5 MG/DL (ref 8.6–10.5)
CHLORIDE SERPL-SCNC: 106 MMOL/L (ref 98–107)
CO2 SERPL-SCNC: 19.7 MMOL/L (ref 22–29)
CREAT BLD-MCNC: 0.66 MG/DL (ref 0.57–1)
DEPRECATED RDW RBC AUTO: 49.7 FL (ref 37–54)
ERYTHROCYTE [DISTWIDTH] IN BLOOD BY AUTOMATED COUNT: 15.4 % (ref 11.7–13)
GFR SERPL CREATININE-BSD FRML MDRD: 93 ML/MIN/1.73
GLUCOSE BLD-MCNC: 252 MG/DL (ref 65–99)
GLUCOSE BLDC GLUCOMTR-MCNC: 223 MG/DL (ref 70–130)
GLUCOSE BLDC GLUCOMTR-MCNC: 244 MG/DL (ref 70–130)
GLUCOSE BLDC GLUCOMTR-MCNC: 374 MG/DL (ref 70–130)
HCT VFR BLD AUTO: 35 % (ref 35.6–45.5)
HGB BLD-MCNC: 11.7 G/DL (ref 11.9–15.5)
MCH RBC QN AUTO: 29.5 PG (ref 26.9–32)
MCHC RBC AUTO-ENTMCNC: 33.4 G/DL (ref 32.4–36.3)
MCV RBC AUTO: 88.2 FL (ref 80.5–98.2)
PHOSPHATE SERPL-MCNC: 2.4 MG/DL (ref 2.5–4.5)
PLATELET # BLD AUTO: 63 10*3/MM3 (ref 140–500)
PMV BLD AUTO: 10.8 FL (ref 6–12)
POTASSIUM BLD-SCNC: 4.3 MMOL/L (ref 3.5–5.2)
RBC # BLD AUTO: 3.97 10*6/MM3 (ref 3.9–5.2)
SODIUM BLD-SCNC: 139 MMOL/L (ref 136–145)
WBC NRBC COR # BLD: 3.03 10*3/MM3 (ref 4.5–10.7)

## 2017-12-19 PROCEDURE — 25010000002 FLUCONAZOLE PER 200 MG: Performed by: INTERNAL MEDICINE

## 2017-12-19 PROCEDURE — 63710000001 INSULIN ASPART PER 5 UNITS: Performed by: INTERNAL MEDICINE

## 2017-12-19 PROCEDURE — 80048 BASIC METABOLIC PNL TOTAL CA: CPT | Performed by: INTERNAL MEDICINE

## 2017-12-19 PROCEDURE — 82962 GLUCOSE BLOOD TEST: CPT

## 2017-12-19 PROCEDURE — 85027 COMPLETE CBC AUTOMATED: CPT | Performed by: INTERNAL MEDICINE

## 2017-12-19 PROCEDURE — 63710000001 INSULIN DETEMER PER 5 UNITS: Performed by: INTERNAL MEDICINE

## 2017-12-19 PROCEDURE — 25010000002 ENOXAPARIN PER 10 MG: Performed by: INTERNAL MEDICINE

## 2017-12-19 PROCEDURE — 84100 ASSAY OF PHOSPHORUS: CPT | Performed by: INTERNAL MEDICINE

## 2017-12-19 PROCEDURE — 99232 SBSQ HOSP IP/OBS MODERATE 35: CPT | Performed by: INTERNAL MEDICINE

## 2017-12-19 RX ADMIN — INSULIN ASPART 20 UNITS: 100 INJECTION, SOLUTION INTRAVENOUS; SUBCUTANEOUS at 17:18

## 2017-12-19 RX ADMIN — INSULIN ASPART 20 UNITS: 100 INJECTION, SOLUTION INTRAVENOUS; SUBCUTANEOUS at 11:45

## 2017-12-19 RX ADMIN — INSULIN DETEMIR 80 UNITS: 100 INJECTION, SOLUTION SUBCUTANEOUS at 21:02

## 2017-12-19 RX ADMIN — LEVETIRACETAM 500 MG: 500 TABLET, FILM COATED ORAL at 11:46

## 2017-12-19 RX ADMIN — ALPRAZOLAM 0.5 MG: 0.5 TABLET ORAL at 22:23

## 2017-12-19 RX ADMIN — CYCLOSPORINE 1 DROP: 0.5 EMULSION OPHTHALMIC at 21:02

## 2017-12-19 RX ADMIN — PANTOPRAZOLE SODIUM 40 MG: 40 TABLET, DELAYED RELEASE ORAL at 06:29

## 2017-12-19 RX ADMIN — PREGABALIN 75 MG: 75 CAPSULE ORAL at 17:18

## 2017-12-19 RX ADMIN — CYCLOSPORINE 1 DROP: 0.5 EMULSION OPHTHALMIC at 11:46

## 2017-12-19 RX ADMIN — INSULIN ASPART 8 UNITS: 100 INJECTION, SOLUTION INTRAVENOUS; SUBCUTANEOUS at 17:18

## 2017-12-19 RX ADMIN — INSULIN ASPART 20 UNITS: 100 INJECTION, SOLUTION INTRAVENOUS; SUBCUTANEOUS at 11:44

## 2017-12-19 RX ADMIN — LEVETIRACETAM 500 MG: 500 TABLET, FILM COATED ORAL at 17:18

## 2017-12-19 RX ADMIN — INSULIN ASPART 16 UNITS: 100 INJECTION, SOLUTION INTRAVENOUS; SUBCUTANEOUS at 07:14

## 2017-12-19 RX ADMIN — VITAMIN D, TAB 1000IU (100/BT) 1000 UNITS: 25 TAB at 11:46

## 2017-12-19 RX ADMIN — OXYCODONE HYDROCHLORIDE 5 MG: 5 TABLET ORAL at 11:45

## 2017-12-19 RX ADMIN — INSULIN ASPART 8 UNITS: 100 INJECTION, SOLUTION INTRAVENOUS; SUBCUTANEOUS at 21:04

## 2017-12-19 RX ADMIN — OXYCODONE HYDROCHLORIDE 5 MG: 5 TABLET ORAL at 22:23

## 2017-12-19 RX ADMIN — ENOXAPARIN SODIUM 40 MG: 40 INJECTION SUBCUTANEOUS at 21:02

## 2017-12-19 RX ADMIN — PREGABALIN 75 MG: 75 CAPSULE ORAL at 11:45

## 2017-12-19 RX ADMIN — DULOXETINE HYDROCHLORIDE 90 MG: 60 CAPSULE, DELAYED RELEASE ORAL at 11:46

## 2017-12-19 RX ADMIN — FLUCONAZOLE 100 MG: 2 INJECTION INTRAVENOUS at 11:45

## 2017-12-19 NOTE — PLAN OF CARE
Problem: Diabetes, Type 2 (Adult)  Goal: Signs and Symptoms of Listed Potential Problems Will be Absent or Manageable (Diabetes, Type 2)  Outcome: Outcome(s) achieved Date Met:  12/19/17    Problem: Fall Risk (Adult)  Goal: Absence of Falls  Outcome: Ongoing (interventions implemented as appropriate)    Problem: Pain, Chronic (Adult)  Goal: Acceptable Pain Control/Comfort Level  Outcome: Ongoing (interventions implemented as appropriate)    Problem: Patient Care Overview (Adult)  Goal: Plan of Care Review  Outcome: Ongoing (interventions implemented as appropriate)    12/19/17 0504   Coping/Psychosocial Response Interventions   Plan Of Care Reviewed With patient   Patient Care Overview   Progress progress toward functional goals as expected   Outcome Evaluation   Outcome Summary/Follow up Plan A&Ox4. Gastroparesis hx with abdominal distention noted. Somewhat relieved from walking around unit, drinking increased carbonation. Anxiety medicated with xanax per prn order. Pain medicated with morphine prn. Slept well throughout night. Abdominal distention continues although somewhat relieved. Multiple loose stools noted prior to sleep.       Goal: Discharge Needs Assessment  Outcome: Ongoing (interventions implemented as appropriate)

## 2017-12-19 NOTE — PROGRESS NOTES
[facility]     Inpatient Follow Up Note    Patient Identification:  Name: Silvia Zabala  Age: 57 y.o.  Sex: female  :  1960  MRN: [id number]    Information from:patient and family    Chief Complaint   Patient presents with   • Vomiting   • Diarrhea       History:   Tolerating her diet. Less abdominal pain. Still with nausea but not severe.     Review of Systems:  Constitutional:  Negative   Cardiovascular:  Negative   Respiratory:  Negative             Problem List:  Patient Active Problem List    Diagnosis   • Diabetic peripheral neuropathy [E11.42]   • History of seizure disorder [Z86.69]   • Diabetic ketoacidosis without coma associated with type 2 diabetes mellitus [E13.10]   • Cirrhosis of liver without ascites [K74.60]     Overview Note:     Added automatically from request for surgery [id number]     • Gastroparesis [K31.84]   • Secondary esophageal varices with bleeding [I85.11]   • Portal hypertension [K76.6]   • Systemic lupus [M32.9]   • Secondary esophageal varices without bleeding [I85.10]   • Ascites [R18.8]   • UGI bleed [K92.2]   • Hyperlipidemia [E78.5]   • Nausea [R11.0]   • DUKES (nonalcoholic steatohepatitis) [K75.81]   • Pancytopenia [D61.818]   • Hypersplenism [D73.1]   • Type 2 diabetes mellitus, uncontrolled, with neuropathy [E11.40, E11.65]   • Fibrositis [M79.7]   • Change in blood platelet count [[id number]]   • Cirrhosis of liver [K74.60]   • Rheumatoid arthritis [M06.9]   • Anxiety and depression [F41.8]     Current Meds:  MAR Reviewed  Scheduled Meds:  cholecalciferol 1,000 Units Oral Daily   cycloSPORINE 1 drop Both Eyes Q12H   DULoxetine 90 mg Oral Daily   enoxaparin 40 mg Subcutaneous Q24H   fluconazole 100 mg Intravenous Daily   insulin aspart 0-24 Units Subcutaneous 4x Daily With Meals & Nightly   insulin aspart 20 Units Subcutaneous TID With Meals   insulin detemir 80 Units Subcutaneous Nightly   levETIRAcetam 500 mg Oral BID   pantoprazole 40 mg  "Oral Q AM   pantoprazole 40 mg Oral Daily   pregabalin 75 mg Oral BID   sodium chloride 1,000 mL Intravenous Once     Continuous Infusions:   PRN Meds:.•  ALPRAZolam  •  glucagon (human recombinant)  •  influenza vaccine  •  Morphine **OR** Morphine  •  ondansetron **OR** ondansetron ODT **OR** ondansetron  •  oxyCODONE  •  potassium chloride **OR** potassium chloride **OR** potassium chloride  •  promethazine  •  sodium chloride  •  sodium chloride  Allergies:  Allergies   Allergen Reactions   • Albuterol Anaphylaxis   • Tramadol Other (See Comments)     Per pt causes her \"palsy, meaning shaking and tremors\" and gi upset, nausea   • Quinine Derivatives Nausea And Vomiting       Intake/Output:     Intake/Output Summary (Last 24 hours) at 17 1730  Last data filed at 17 1300   Gross per 24 hour   Intake              480 ml   Output                0 ml   Net              480 ml     New allergies/reactions:  None    Physical Exam:  Vitals:   Temp (24hrs), Av.2 °F (37.3 °C), Min:98.3 °F (36.8 °C), Max:100.5 °F (38.1 °C)    Temp:  [98.3 °F (36.8 °C)-100.5 °F (38.1 °C)] 98.7 °F (37.1 °C)  Heart Rate:  [103-117] 117  Resp:  [20] 20  BP: (135-144)/(59-92) 137/87  /87 (BP Location: Right arm, Patient Position: Lying)  Pulse 117  Temp 98.7 °F (37.1 °C) (Oral)   Resp 20  Ht 168.9 cm (66.5\")  Wt 79.4 kg (175 lb)  SpO2 96%  BMI 27.82 kg/m2    Exam:  NAD  PERRLA. Sclerae and conjunctivae normal  HENT: external inspection normal. Hearing intact.  No respiratory distress.  Alert, oriented, normal affect.         DATA:  Radiology and Labs:   Recent Results (from the past 24 hour(s))   Basic Metabolic Panel    Collection Time: 17  3:40 AM   Result Value Ref Range    Glucose 252 (H) 65 - 99 mg/dL    BUN 3 (L) 6 - 20 mg/dL    Creatinine 0.66 0.57 - 1.00 mg/dL    Sodium 139 136 - 145 mmol/L    Potassium 4.3 3.5 - 5.2 mmol/L    Chloride 106 98 - 107 mmol/L    CO2 19.7 (L) 22.0 - 29.0 mmol/L    Calcium " 8.5 (L) 8.6 - 10.5 mg/dL    eGFR Non African Amer 93 >60 mL/min/1.73    BUN/Creatinine Ratio 4.5 (L) 7.0 - 25.0    Anion Gap 13.3 mmol/L   Phosphorus    Collection Time: 12/19/17  3:40 AM   Result Value Ref Range    Phosphorus 2.4 (L) 2.5 - 4.5 mg/dL   CBC (No Diff)    Collection Time: 12/19/17  3:40 AM   Result Value Ref Range    WBC 3.03 (L) 4.50 - 10.70 10*3/mm3    RBC 3.97 3.90 - 5.20 10*6/mm3    Hemoglobin 11.7 (L) 11.9 - 15.5 g/dL    Hematocrit 35.0 (L) 35.6 - 45.5 %    MCV 88.2 80.5 - 98.2 fL    MCH 29.5 26.9 - 32.0 pg    MCHC 33.4 32.4 - 36.3 g/dL    RDW 15.4 (H) 11.7 - 13.0 %    RDW-SD 49.7 37.0 - 54.0 fl    MPV 10.8 6.0 - 12.0 fL    Platelets 63 (L) 140 - 500 10*3/mm3   POC Glucose Once    Collection Time: 12/19/17 11:12 AM   Result Value Ref Range    Glucose 374 (H) 70 - 130 mg/dL   POC Glucose Once    Collection Time: 12/19/17  4:49 PM   Result Value Ref Range    Glucose 244 (H) 70 - 130 mg/dL       Assessment:   Problem List:   Active Problems:    Cirrhosis of liver    Type 2 diabetes mellitus, uncontrolled, with neuropathy    DUKES (nonalcoholic steatohepatitis)    Hyperlipidemia    Gastroparesis    Diabetic ketoacidosis without coma associated with type 2 diabetes mellitus    Diabetic peripheral neuropathy    History of seizure disorder    The CT has not been read yet. I spoke with Dr Madrigal who will pull the images and provide an interpretation this evening.     Plan:   Continue current management and careful monitoring.         Rich Coleman MD  [facility]  12/19/2017

## 2017-12-19 NOTE — NURSING NOTE
Abdomen distended with history of gastroparesis. Bowel sounds hypoactive with continual loose green stools throughout the day. States she may have gas causing problem. Carbonated beverages placed at bedside with pt. Encouraged to drink. Ambulation around hallway. Will monitor.

## 2017-12-19 NOTE — PROGRESS NOTES
57 y.o.   LOS: 3 days   Patient Care Team:  Blanca Pitts MD as PCP - General (Internal Medicine)  Sukhdeep Mcdowell MD as Consulting Physician (Hematology and Oncology)  Blanca Pitts MD as Referring Physician (Internal Medicine)    Chief Complaint:  Hyperglycemia    Chief Complaint   Patient presents with   • Vomiting   • Diarrhea       Subjective No over night issues and is eating well.  No further nausea or vomiting episodes.  Blood sugars are elevated in 300s range.  Patient did not receive her breakfast NovoLog-15 units.      Interval History:    Review of Systems:   Review of Systems   Constitutional: Positive for appetite change and fatigue.   Gastrointestinal: Negative for vomiting.   Musculoskeletal: Positive for back pain and myalgias.   Skin: Negative for wound.   Neurological: Positive for weakness and numbness.     Objective     Vital Signs   Temp:  [98.3 °F (36.8 °C)-100.5 °F (38.1 °C)] 98.3 °F (36.8 °C)  Heart Rate:  [] 103  Resp:  [20] 20  BP: (135-149)/(59-92) 136/88      Physical Exam   Alert and oriented ×3  Acanthosis nigricans noted, thyroid palpable  Clear to auscultation  Mild abdominal tenderness, bowel sounds heard, no lipodystrophy  No edema, Hammer toes noted.    Results Review:     I reviewed the patient's new clinical results.      Glucose   Date/Time Value Ref Range Status   12/19/2017 0340 252 (H) 65 - 99 mg/dL Final   12/18/2017 0957 327 (H) 65 - 99 mg/dL Final   12/17/2017 1023 185 (H) 65 - 99 mg/dL Final   12/17/2017 0401 287 (H) 65 - 99 mg/dL Final   12/17/2017 0011 443 (C) 65 - 99 mg/dL Final   12/16/2017 2036 717 (C) 65 - 99 mg/dL Final   12/16/2017 1839 842 (C) 65 - 99 mg/dL Final   12/16/2017 1650 981 (C) 65 - 99 mg/dL Final     Lab Results (last 72 hours)     Procedure Component Value Units Date/Time    CBC & Differential [[id number]] Collected:  12/16/17 1650    Specimen:  Blood Updated:  12/16/17 1702    Narrative:       The following orders were created for panel order  CBC & Differential.  Procedure                               Abnormality         Status                     ---------                               -----------         ------                     CBC Auto Differential[[id number]]        Abnormal            Final result                 Please view results for these tests on the individual orders.    CBC Auto Differential [[id number]]  (Abnormal) Collected:  12/16/17 1650    Specimen:  Blood Updated:  12/16/17 1702     WBC 5.63 10*3/mm3      RBC 4.71 10*6/mm3      Hemoglobin 14.0 g/dL      Hematocrit 42.2 %      MCV 89.6 fL      MCH 29.7 pg      MCHC 33.2 g/dL      RDW 15.3 (H) %      RDW-SD 49.5 fl      MPV 10.7 fL      Platelets 124 (L) 10*3/mm3      Neutrophil % 74.2 %      Lymphocyte % 17.1 (L) %      Monocyte % 6.6 %      Eosinophil % 1.1 %      Basophil % 0.5 %      Immature Grans % 0.5 %      Neutrophils, Absolute 4.18 10*3/mm3      Lymphocytes, Absolute 0.96 10*3/mm3      Monocytes, Absolute 0.37 10*3/mm3      Eosinophils, Absolute 0.06 10*3/mm3      Basophils, Absolute 0.03 10*3/mm3      Immature Grans, Absolute 0.03 10*3/mm3     Lipase [[id number]]  (Normal) Collected:  12/16/17 1650    Specimen:  Blood Updated:  12/16/17 1722     Lipase 22 U/L     Comprehensive Metabolic Panel [[id number]]  (Abnormal) Collected:  12/16/17 1650    Specimen:  Blood Updated:  12/16/17 1741     Glucose 981 (C) mg/dL      BUN 16 mg/dL      Creatinine 1.19 (H) mg/dL      Sodium 119 (C) mmol/L      Potassium 6.0 (H) mmol/L      Chloride 78 (L) mmol/L      CO2 18.4 (L) mmol/L      Calcium 9.2 mg/dL      Total Protein 8.3 g/dL      Albumin 3.90 g/dL      ALT (SGPT) 34 (H) U/L      AST (SGOT) 26 U/L      Alkaline Phosphatase 174 (H) U/L      Total Bilirubin 1.2 mg/dL      eGFR Non African Amer 47 (L) mL/min/1.73      Globulin 4.4 gm/dL      A/G Ratio 0.9 g/dL      BUN/Creatinine Ratio 13.4     Anion Gap 22.6 mmol/L     POC Glucose Once [[id number]]  (Abnormal) Collected:  12/16/17 3331     Specimen:  Blood Updated:  12/16/17 1743     Glucose >599 (C) mg/dL     Narrative:       Treated Patient Meter: [id number] : [id number] Frederic Mahajan    [facility] Draw [[id number]] Collected:  12/16/17 1650    Specimen:  Blood Updated:  12/16/17 1801    Narrative:       The following orders were created for panel order [facility] Draw.  Procedure                               Abnormality         Status                     ---------                               -----------         ------                     Light Blue Top[[id number]]                                   Final result               Green Top (Gel)[[id number]]                                  Final result               Lavender Top[[id number]]                                     Final result               Gold Top - SST[[id number]]                                   Final result                 Please view results for these tests on the individual orders.    Light Blue Top [[id number]] Collected:  12/16/17 1650    Specimen:  Blood Updated:  12/16/17 1801     Extra Tube hold for add-on      Auto resulted       Green Top (Gel) [[id number]] Collected:  12/16/17 1650    Specimen:  Blood Updated:  12/16/17 1801     Extra Tube Hold for add-ons.      Auto resulted.       Lavender Top [[id number]] Collected:  12/16/17 1650    Specimen:  Blood Updated:  12/16/17 1801     Extra Tube hold for add-on      Auto resulted       Gold Top - SST [[id number]] Collected:  12/16/17 1650    Specimen:  Blood Updated:  12/16/17 1801     Extra Tube Hold for add-ons.      Auto resulted.       Blood Gas, Arterial [[id number]]  (Abnormal) Collected:  12/16/17 1809    Specimen:  Arterial Blood Updated:  12/16/17 1811     Site Arterial: right brachial     Mayur's Test N/A     pH, Arterial 7.454 (H) pH units      pCO2, Arterial 24.2 (L) mm Hg      pO2, Arterial 76.9 (L) mm Hg      HCO3, Arterial 17.0 (L) mmol/L      Base Excess, Arterial -5.3 (L) mmol/L      O2 Saturation Calculated 96.2 %       Barometric Pressure for Blood Gas 752.1 mmHg      Modality Room Air     Rate 28 Breaths/minute     Narrative:       % Meter: [id number] : [id number] Ryley Schumacher    Clostridium Difficile Toxin - Stool, Per Rectum [[id number]] Collected:  12/16/17 1707    Specimen:  Stool from Per Rectum Updated:  12/16/17 1831    Narrative:       The following orders were created for panel order Clostridium Difficile Toxin - Stool, Per Rectum.  Procedure                               Abnormality         Status                     ---------                               -----------         ------                     Clostridium Difficile To...[[id number]]  Normal              Final result                 Please view results for these tests on the individual orders.    Clostridium Difficile Toxin, PCR - Stool, Per Rectum [[id number]]  (Normal) Collected:  12/16/17 1707    Specimen:  Stool from Per Rectum Updated:  12/16/17 1831     C. Difficile Toxins by PCR Negative    Urinalysis With / Culture If Indicated - Urine, Clean Catch [[id number]]  (Abnormal) Collected:  12/16/17 1823    Specimen:  Urine from Urine, Clean Catch Updated:  12/16/17 1851     Color, UA Yellow     Appearance, UA Clear     pH, UA 6.0     Specific Gravity, UA >=1.030     Glucose, UA >=1000 mg/dL (3+) (A)     Ketones, UA 15 mg/dL (1+) (A)     Bilirubin, UA Negative     Blood, UA Negative     Protein, UA Negative     Leuk Esterase, UA Negative     Nitrite, UA Negative     Urobilinogen, UA 0.2 E.U./dL    Narrative:       Urine microscopic not indicated.    POC Glucose Once [[id number]]  (Abnormal) Collected:  12/16/17 1855    Specimen:  Blood Updated:  12/16/17 1856     Glucose >599 (C) mg/dL     Narrative:       Confirmed by Repeat Meter: [id number] : [id number] Reji Li RN    Phosphorus [[id number]]  (Abnormal) Collected:  12/16/17 1839    Specimen:  Blood Updated:  12/16/17 1909     Phosphorus 1.6 (L) mg/dL     Magnesium [[id number]]  (Normal)  Collected:  12/16/17 1839    Specimen:  Blood Updated:  12/16/17 1909     Magnesium 1.9 mg/dL     Calcium, Ionized [[id number]]  (Normal) Collected:  12/16/17 1839    Specimen:  Blood Updated:  12/16/17 1913     Ionized Calcium 1.16 mmol/L      Ionized Calcium 4.6 mg/dL     Beta Hydroxybutyrate Quantitative [[id number]]  (Abnormal) Collected:  12/16/17 1839    Specimen:  Blood Updated:  12/16/17 1922     Beta-Hydroxybutyrate Quant 1.610 (H) mmol/L     Basic Metabolic Panel [[id number]]  (Abnormal) Collected:  12/16/17 1839    Specimen:  Blood Updated:  12/16/17 1922     Glucose 842 (C) mg/dL      BUN 16 mg/dL      Creatinine 1.11 (H) mg/dL      Sodium 126 (L) mmol/L      Potassium 4.1 mmol/L      Chloride 85 (L) mmol/L      CO2 18.2 (L) mmol/L      Calcium 8.5 (L) mg/dL      eGFR Non African Amer 51 (L) mL/min/1.73      BUN/Creatinine Ratio 14.4     Anion Gap 22.8 mmol/L     Narrative:       GFR Normal >60  Chronic Kidney Disease <60  Kidney Failure <15    Hemoglobin A1c [[id number]]  (Abnormal) Collected:  12/16/17 1650    Specimen:  Blood Updated:  12/16/17 1941     Hemoglobin A1C 11.20 (H) %     Narrative:       Hemoglobin A1C Ranges:    Increased Risk for Diabetes  5.7% to 6.4%  Diabetes                     >= 6.5%  Diabetic Goal                < 7.0%    POC Glucose Once [[id number]]  (Abnormal) Collected:  12/16/17 2005    Specimen:  Blood Updated:  12/16/17 2007     Glucose >599 (C) mg/dL     Narrative:       Treated Patient Meter: [id number] : [id number] Moisés Parisi    POC Glucose Once [[id number]]  (Abnormal) Collected:  12/16/17 2012    Specimen:  Blood Updated:  12/16/17 2016     Glucose >599 (C) mg/dL     Narrative:       Confirmed by Repeat Meter: [id number] : [id number] [facility]    POC Glucose Once [[id number]]  (Abnormal) Collected:  12/16/17 2028    Specimen:  Blood Updated:  12/16/17 2039     Glucose >599 (C) mg/dL     Narrative:       Confirmed by Repeat Meter: [id number] :  [id number] Shoshana Pool    Glucose, Random [[id number]]  (Abnormal) Collected:  12/16/17 2036    Specimen:  Blood Updated:  12/16/17 2110     Glucose 717 (C) mg/dL     Calcium, Ionized [[id number]]  (Normal) Collected:  12/16/17 2036    Specimen:  Blood Updated:  12/16/17 2113     Ionized Calcium 1.18 mmol/L      Ionized Calcium 4.7 mg/dL     POC Glucose Once [[id number]]  (Abnormal) Collected:  12/16/17 2212    Specimen:  Blood Updated:  12/16/17 2223     Glucose 570 (C) mg/dL     Narrative:       Meter: [id number] : [id number] Jarrell PCA    POC Glucose Once [[id number]]  (Abnormal) Collected:  12/16/17 2309    Specimen:  Blood Updated:  12/16/17 2320     Glucose 516 (C) mg/dL     Narrative:       Meter: [id number] : [id number] Jarrell PCA    POC Glucose Once [[id number]]  (Abnormal) Collected:  12/17/17 0010    Specimen:  Blood Updated:  12/17/17 0018     Glucose 377 (H) mg/dL     Narrative:       Meter: [id number] : [id number] Jarrell PCA    POC Glucose Once [[id number]]  (Abnormal) Collected:  12/17/17 0011    Specimen:  Blood Updated:  12/17/17 0018     Glucose 441 (H) mg/dL     Narrative:       Meter: [id number] : [id number] Jarrell PCA    Phosphorus [[id number]]  (Abnormal) Collected:  12/17/17 0011    Specimen:  Blood Updated:  12/17/17 0044     Phosphorus 1.4 (L) mg/dL     Magnesium [[id number]]  (Normal) Collected:  12/17/17 0011    Specimen:  Blood Updated:  12/17/17 0044     Magnesium 1.8 mg/dL     Calcium, Ionized [[id number]]  (Normal) Collected:  12/17/17 0011    Specimen:  Blood Updated:  12/17/17 0048     Ionized Calcium 1.19 mmol/L      Ionized Calcium 4.8 mg/dL     Basic Metabolic Panel [[id number]]  (Abnormal) Collected:  12/17/17 0011    Specimen:  Blood Updated:  12/17/17 0053     Glucose 443 (C) mg/dL      BUN 11 mg/dL      Creatinine 0.76 mg/dL      Sodium 135 (L) mmol/L      Potassium 3.7 mmol/L      Chloride 98 mmol/L      CO2 23.0 mmol/L      Calcium 8.6 mg/dL       eGFR Non African Amer 79 mL/min/1.73      BUN/Creatinine Ratio 14.5     Anion Gap 14.0 mmol/L     Narrative:       GFR Normal >60  Chronic Kidney Disease <60  Kidney Failure <15    POC Glucose Once [[id number]]  (Abnormal) Collected:  12/17/17 0100    Specimen:  Blood Updated:  12/17/17 0120     Glucose 365 (H) mg/dL     Narrative:       Meter: [id number] : [id number] Gerson NOE    POC Glucose Once [[id number]]  (Abnormal) Collected:  12/17/17 0210    Specimen:  Blood Updated:  12/17/17 0219     Glucose 335 (H) mg/dL     Narrative:       Meter: [id number] : [id number] Tino Mckinney    POC Glucose Once [[id number]]  (Abnormal) Collected:  12/17/17 0307    Specimen:  Blood Updated:  12/17/17 0332     Glucose 323 (H) mg/dL     Narrative:       Meter: [id number] : [id number] Tino Mckinney    POC Glucose Once [[id number]]  (Abnormal) Collected:  12/17/17 0403    Specimen:  Blood Updated:  12/17/17 0408     Glucose 300 (H) mg/dL     Narrative:       Meter: [id number] : [id number] Gerson NOE    Phosphorus [[id number]]  (Abnormal) Collected:  12/17/17 0401    Specimen:  Blood Updated:  12/17/17 0452     Phosphorus 1.7 (L) mg/dL     Basic Metabolic Panel [[id number]]  (Abnormal) Collected:  12/17/17 0401    Specimen:  Blood Updated:  12/17/17 0452     Glucose 287 (H) mg/dL      BUN 9 mg/dL      Creatinine 0.69 mg/dL      Sodium 137 mmol/L      Potassium 3.8 mmol/L      Chloride 102 mmol/L      CO2 21.1 (L) mmol/L      Calcium 8.2 (L) mg/dL      eGFR Non African Amer 88 mL/min/1.73      BUN/Creatinine Ratio 13.0     Anion Gap 13.9 mmol/L     Narrative:       GFR Normal >60  Chronic Kidney Disease <60  Kidney Failure <15    Magnesium [[id number]]  (Normal) Collected:  12/17/17 0401    Specimen:  Blood Updated:  12/17/17 0452     Magnesium 1.7 mg/dL     Calcium, Ionized [[id number]]  (Normal) Collected:  12/17/17 0401    Specimen:  Blood Updated:  12/17/17 0516     Ionized Calcium 1.16 mmol/L       Ionized Calcium 4.6 mg/dL     POC Glucose Once [[id number]]  (Abnormal) Collected:  12/17/17 0501    Specimen:  Blood Updated:  12/17/17 0522     Glucose 272 (H) mg/dL     Narrative:       Meter: [id number] : [id number] Gerson Jarrell PCA    POC Glucose Once [[id number]]  (Abnormal) Collected:  12/17/17 0602    Specimen:  Blood Updated:  12/17/17 0622     Glucose 247 (H) mg/dL     Narrative:       Meter: [id number] : [id number] Gerson Jarrell PCA    Potassium [[id number]]  (Normal) Collected:  12/17/17 0606    Specimen:  Blood Updated:  12/17/17 0652     Potassium 3.9 mmol/L     POC Glucose Once [[id number]]  (Abnormal) Collected:  12/17/17 0659    Specimen:  Blood Updated:  12/17/17 0719     Glucose 259 (H) mg/dL     Narrative:       Meter: [id number] : [id number] Gerson Jarrell PCA    POC Glucose Once [[id number]]  (Abnormal) Collected:  12/17/17 0834    Specimen:  Blood Updated:  12/17/17 0837     Glucose 200 (H) mg/dL     Narrative:       Meter: [id number] : [id number] Derek Goel RN    Calcium, Ionized [[id number]]  (Abnormal) Collected:  12/17/17 0832    Specimen:  Blood Updated:  12/17/17 0915     Ionized Calcium 1.08 (L) mmol/L      Ionized Calcium 4.3 (L) mg/dL     Magnesium [[id number]]  (Normal) Collected:  12/17/17 0832    Specimen:  Blood Updated:  12/17/17 0917     Magnesium 1.7 mg/dL     Phosphorus [[id number]]  (Abnormal) Collected:  12/17/17 0832    Specimen:  Blood Updated:  12/17/17 0917     Phosphorus 1.3 (L) mg/dL     POC Glucose Once [[id number]]  (Abnormal) Collected:  12/17/17 1019    Specimen:  Blood Updated:  12/17/17 1024     Glucose 179 (H) mg/dL     Narrative:       Meter: [id number] : [id number] Derek Goel RN    Giardia / Cryptosporidium Screen - Stool, Per Rectum [[id number]]  (Normal) Collected:  12/16/17 1707    Specimen:  Stool from Per Rectum Updated:  12/17/17 1032     Cryptosporidium Antigen Negative     Giardia Antigen, Stool Negative    Fecal Lactoferrin - Stool,  Per Rectum [[id number]]  (Normal) Collected:  12/16/17 1707    Specimen:  Stool from Per Rectum Updated:  12/17/17 1033     Lactoferrin, Qual Negative    Basic Metabolic Panel [[id number]]  (Abnormal) Collected:  12/17/17 1023    Specimen:  Blood Updated:  12/17/17 1102     Glucose 185 (H) mg/dL      BUN 6 mg/dL      Creatinine 0.70 mg/dL      Sodium 138 mmol/L      Potassium 4.2 mmol/L      Chloride 105 mmol/L      CO2 20.8 (L) mmol/L      Calcium 8.0 (L) mg/dL      eGFR Non African Amer 87 mL/min/1.73      BUN/Creatinine Ratio 8.6     Anion Gap 12.2 mmol/L     Narrative:       GFR Normal >60  Chronic Kidney Disease <60  Kidney Failure <15    Fecal Fat, Qualitative - Stool, Per Rectum [[id number]]  (Normal) Collected:  12/16/17 1707    Specimen:  Stool from Per Rectum Updated:  12/17/17 1205     Fecal Fats, Qualitative, Fatty Acids 0-100 fat droplets / hpf     Fecal Fat Qualitative, Neutral Fats 0-50 fat droplets / hpf    POC Glucose Once [[id number]]  (Abnormal) Collected:  12/17/17 1239    Specimen:  Blood Updated:  12/17/17 1240     Glucose 241 (H) mg/dL     Narrative:       Meter: [id number] : [id number] Hong Schmitzbrey    POC Glucose Once [[id number]]  (Abnormal) Collected:  12/17/17 1558    Specimen:  Blood Updated:  12/17/17 1559     Glucose 216 (H) mg/dL     Narrative:       Meter: [id number] : [id number] Hong Colt    POC Glucose Once [[id number]]  (Abnormal) Collected:  12/17/17 2043    Specimen:  Blood Updated:  12/17/17 2048     Glucose 198 (H) mg/dL     Narrative:       Meter: [id number] : [id number] NOE    POC Glucose Once [[id number]]  (Abnormal) Collected:  12/17/17 2356    Specimen:  Blood Updated:  12/18/17 0001     Glucose 218 (H) mg/dL     Narrative:       Meter: [id number] : [id number] NOE    Microalbumin / Creatinine Urine Ratio - [[id number]] Collected:  12/17/17 2358    Specimen:  Urine Updated:  12/18/17 0123     Microalbumin/Creatinine Ratio -- mg/g        Unable to calculate        Creatinine, Urine 15.0 mg/dL      Microalbumin, Urine <1.2 mg/L     POC Glucose Once [[id number]]  (Normal) Collected:  12/18/17 0401    Specimen:  Blood Updated:  12/18/17 0413     Glucose 116 mg/dL     Narrative:       Meter: [id number] : [id number] Gerson NOE    Glutamic Acid Decarboxylase [[id number]] Collected:  12/18/17 0405    Specimen:  Blood Updated:  12/18/17 0431    C-Peptide [[id number]] Collected:  12/18/17 0405    Specimen:  Blood Updated:  12/18/17 0431    CBC (No Diff) [[id number]]  (Abnormal) Collected:  12/18/17 0405    Specimen:  Blood Updated:  12/18/17 0454     WBC 3.38 (L) 10*3/mm3      RBC 3.86 (L) 10*6/mm3      Hemoglobin 11.3 (L) g/dL      Hematocrit 33.5 (L) %      MCV 86.8 fL      MCH 29.3 pg      MCHC 33.7 g/dL      RDW 15.8 (H) %      RDW-SD 49.7 fl      MPV 10.5 fL      Platelets 75 (L) 10*3/mm3     Lipid Panel [[id number]]  (Abnormal) Collected:  12/18/17 0405    Specimen:  Blood Updated:  12/18/17 0504     Total Cholesterol 129 mg/dL      Triglycerides 186 (H) mg/dL      HDL Cholesterol 22 (L) mg/dL      LDL Cholesterol  70 mg/dL      VLDL Cholesterol 37.2 mg/dL      LDL/HDL Ratio 3.17    Narrative:       Cholesterol Reference Ranges  (U.S. Department of Health and Human Services ATP III Classifications)    Desirable          <200 mg/dL  Borderline High    200-239 mg/dL  High Risk          >240 mg/dL      Triglyceride Reference Ranges  (U.S. Department of Health and Human Services ATP III Classifications)    Normal           <150 mg/dL  Borderline High  150-199 mg/dL  High             200-499 mg/dL  Very High        >500 mg/dL    HDL Reference Ranges  (U.S. Department of Health and Human Services ATP III Classifcations)    Low     <40 mg/dl (major risk factor for CHD)  High    >60 mg/dl ('negative' risk factor for CHD)        LDL Reference Ranges  (U.S. Department of Health and Human Services ATP III Classifcations)    Optimal          <100 mg/dL  Near  Optimal     100-129 mg/dL  Borderline High  130-159 mg/dL  High             160-189 mg/dL  Very High        >189 mg/dL    POC Glucose Once [[id number]]  (Normal) Collected:  12/18/17 0720    Specimen:  Blood Updated:  12/18/17 0722     Glucose 111 mg/dL     Narrative:       Meter: [id number] : [id number] Geni Rockwell    Stool Culture With Yersinia - Stool, Per Rectum [[id number]] Collected:  12/16/17 1707    Specimen:  Stool from Per Rectum Updated:  12/18/17 1021     Stool Cx w/ Yersinia --      Heavy growth (4+) Normal Fecal Sonali    Basic Metabolic Panel [[id number]]  (Abnormal) Collected:  12/18/17 0957    Specimen:  Blood Updated:  12/18/17 1058     Glucose 327 (H) mg/dL      BUN 3 (L) mg/dL      Creatinine 0.71 mg/dL      Sodium 135 (L) mmol/L      Potassium 4.5 mmol/L      Chloride 106 mmol/L      CO2 17.1 (L) mmol/L      Calcium 7.9 (L) mg/dL      eGFR Non African Amer 85 mL/min/1.73      BUN/Creatinine Ratio 4.2 (L)     Anion Gap 11.9 mmol/L     Narrative:       GFR Normal >60  Chronic Kidney Disease <60  Kidney Failure <15    POC Glucose Once [[id number]]  (Abnormal) Collected:  12/18/17 1114    Specimen:  Blood Updated:  12/18/17 1115     Glucose 329 (H) mg/dL     Narrative:       Meter: [id number] : [id number] Geni Rockwell        Imaging Results (last 72 hours)     Procedure Component Value Units Date/Time    CT Abdomen Pelvis Without Contrast [[id number]] Updated:  12/17/17 1616          Medication Review: done      Current Facility-Administered Medications:   •  ALPRAZolam (XANAX) tablet 0.5 mg, 0.5 mg, Oral, BID PRN, Abdirizak Vasquez MD, 0.5 mg at 12/18/17 2110  •  cholecalciferol (VITAMIN D3) tablet 1,000 Units, 1,000 Units, Oral, Daily, Juan Martinez MD, 1,000 Units at 12/18/17 2055  •  cycloSPORINE (RESTASIS) 0.05 % ophthalmic emulsion 1 drop, 1 drop, Both Eyes, Q12H, Juan H. Martinez, MD, 1 drop at 12/18/17 2123  •  dextrose (D50W) solution 12.5 g, 12.5 g, Intravenous, Q15 Min PRN, Teena BERNARD  BRAYDEN Pang  •  dextrose (D50W) solution 12.5 g, 12.5 g, Intravenous, Q15 Min PRN, Juan Martinez MD  •  dextrose (D50W) solution 25 g, 25 g, Intravenous, Q15 Min PRN, Juan Martinez MD  •  dextrose (GLUTOSE) oral gel 15 g, 15 g, Oral, Q15 Min PRN, Juan Martinez MD  •  dextrose 5 % and sodium chloride 0.45 % infusion, 150 mL/hr, Intravenous, Continuous PRN, Teena Pang PA-C  •  dextrose 5 % and sodium chloride 0.45 % infusion, 150 mL/hr, Intravenous, Continuous PRN, Juan Martinez MD  •  dextrose 5 % and sodium chloride 0.45 % with KCl 20 mEq/L infusion, 150 mL/hr, Intravenous, Continuous PRN, Teena Pang PA-C  •  dextrose 5 % and sodium chloride 0.45 % with KCl 20 mEq/L infusion, 150 mL/hr, Intravenous, Continuous PRN, Juan Martinez MD  •  DULoxetine (CYMBALTA) DR capsule 90 mg, 90 mg, Oral, Daily, Juan Martinez MD, 90 mg at 12/18/17 2054  •  enoxaparin (LOVENOX) syringe 40 mg, 40 mg, Subcutaneous, Q24H, Juan Martinez MD, 40 mg at 12/18/17 2316  •  fluconazole (DIFLUCAN) IVPB 100 mg in 50mL NaCl, 100 mg, Intravenous, Daily, Juan Martinez MD, 100 mg at 12/18/17 0846  •  glucagon (human recombinant) (GLUCAGEN DIAGNOSTIC) injection 1 mg, 1 mg, Subcutaneous, Q15 Min PRN, Juan Martinez MD  •  Influenza Vac Subunit Quad (FLUCELVAX) injection 0.5 mL, 0.5 mL, Intramuscular, During Hospitalization, Juan Martinez MD  •  insulin aspart (novoLOG) injection 0-24 Units, 0-24 Units, Subcutaneous, 4x Daily With Meals & Nightly, Juan Martinez MD, 16 Units at 12/19/17 0714  •  insulin aspart (novoLOG) injection 20 Units, 20 Units, Subcutaneous, TID With Meals, Merary Burnett MD  •  insulin detemir (LEVEMIR) injection 80 Units, 80 Units, Subcutaneous, Nightly, Endy Vasquez MD, 80 Units at 12/17/17 1405  •  levETIRAcetam (KEPPRA) tablet 500 mg, 500 mg, Oral, BID, Juan Martinez MD, 500 mg at 12/18/17 2054  •  morphine injection 1 mg, 1 mg, Intravenous, Q4H PRN **OR** morphine injection 2 mg, 2  mg, Intravenous, Q4H PRN, Abdirizak Vasquez MD, 2 mg at 12/18/17 2331  •  ondansetron (ZOFRAN) tablet 4 mg, 4 mg, Oral, Q6H PRN **OR** ondansetron ODT (ZOFRAN-ODT) disintegrating tablet 4 mg, 4 mg, Oral, Q6H PRN **OR** ondansetron (ZOFRAN) injection 4 mg, 4 mg, Intravenous, Q6H PRN, Juan HGage Martinez MD, 4 mg at 12/17/17 1059  •  oxyCODONE (ROXICODONE) immediate release tablet 5 mg, 5 mg, Oral, Q8H PRN, Juan HGage Martinez MD  •  pantoprazole (PROTONIX) EC tablet 40 mg, 40 mg, Oral, Q AM, Endy Vasquez MD, 40 mg at 12/19/17 0629  •  pantoprazole (PROTONIX) EC tablet 40 mg, 40 mg, Oral, Daily, Juan H. MD Michelle, 40 mg at 12/18/17 2058  •  potassium chloride (MICRO-K) CR capsule 10 mEq, 10 mEq, Oral, PRN **OR** potassium chloride (KLOR-CON) packet 10 mEq, 10 mEq, Oral, PRN **OR** potassium chloride 10 mEq in 100 mL IVPB, 10 mEq, Intravenous, Q1H PRN, Juan H. MD Michelle  •  potassium chloride (MICRO-K) CR capsule 20 mEq, 20 mEq, Oral, PRN, 20 mEq at 12/17/17 0657 **OR** potassium chloride (KLOR-CON) packet 20 mEq, 20 mEq, Oral, PRN **OR** potassium chloride 10 mEq in 100 mL IVPB, 10 mEq, Intravenous, Q1H PRN, Juan HGage Martinez MD  •  potassium chloride (MICRO-K) CR capsule 40 mEq, 40 mEq, Oral, PRN **OR** potassium chloride (KLOR-CON) packet 40 mEq, 40 mEq, Oral, PRN **OR** potassium chloride 10 mEq in 100 mL IVPB, 10 mEq, Intravenous, Q1H PRN, Juan HGage Martinez MD  •  pregabalin (LYRICA) capsule 75 mg, 75 mg, Oral, BID, Juan Martinez MD, 75 mg at 12/18/17 2055  •  promethazine (PHENERGAN) injection 12.5 mg, 12.5 mg, Intravenous, Q4H PRN, Endy Vasquez MD, 12.5 mg at 12/18/17 2331  •  sodium chloride 0.45 % with KCl 20 mEq/L infusion, 250 mL/hr, Intravenous, Continuous PRN, Juan Martinez MD, Last Rate: 250 mL/hr at 12/18/17 0846, 250 mL/hr at 12/18/17 0846  •  sodium chloride 0.9 % bolus 1,000 mL, 1,000 mL, Intravenous, Once, Juan Martinez MD  •  sodium chloride 0.9 % flush 1-10 mL, 1-10 mL, Intravenous, PRN, Juan H.  "MD Michelle  •  sodium chloride 0.9 % flush 10 mL, 10 mL, Intravenous, PRN, Darinel Montelongo MD    Assessment/Plan     Active Hospital Problems (** Indicates Principal Problem)    Diagnosis Date Noted   • Diabetic peripheral neuropathy [E11.42] 12/17/2017   • History of seizure disorder [Z86.69] 12/17/2017   • Diabetic ketoacidosis without coma associated with type 2 diabetes mellitus [E13.10] 12/16/2017   • Gastroparesis [K31.84] 10/17/2017   • Hyperlipidemia [E78.5]    • DUKES (nonalcoholic steatohepatitis) [K75.81] 06/14/2016   • Type 2 diabetes mellitus, uncontrolled, with neuropathy [E11.40, E11.65] 03/15/2016   • Cirrhosis of liver [K74.60] 03/08/2016      Resolved Hospital Problems    Diagnosis Date Noted Date Resolved   No resolved problems to display.     Type 2 diabetes mellitus-uncontrolled  Will continue levemir 80 units at bedtime  Increase NovoLog to 20 units with each meal  Continue the NovoLog sliding scale 3 times a day before meals and at bedtime.  Pending tushar 65, islet cell antibody levels.    Discussed with the patient the difference between type 1 diabetes mellitus and type 2 diabetes mellitus.    Gastroparesis  GI on board.    Hyperlipidemia  LDL at range.    Discussed with the patient about the benefits of CGM and reassured her that we would consider this option as outpatient.    Merary Burnett MD.  12/19/17  2:04 PM      EMR Dragon / transcription disclaimer:    \"Dictated utilizing Dragon dictation\".   "

## 2017-12-19 NOTE — PLAN OF CARE
Problem: Diabetes, Type 2 (Adult)  Goal: Signs and Symptoms of Listed Potential Problems Will be Absent or Manageable (Diabetes, Type 2)  Outcome: Ongoing (interventions implemented as appropriate)    12/19/17 1141   Diabetes, Type 2   Problems Assessed (Type 2 Diabetes) all   Problems Present (Type 2 Diabetes) impaired glycemic control;situational response         Problem: Fall Risk (Adult)  Goal: Absence of Falls  Outcome: Ongoing (interventions implemented as appropriate)    12/19/17 1141   Fall Risk (Adult)   Absence of Falls making progress toward outcome         Problem: Pain, Chronic (Adult)  Goal: Acceptable Pain Control/Comfort Level  Outcome: Ongoing (interventions implemented as appropriate)    12/19/17 1141   Pain, Chronic (Adult)   Acceptable Pain Control/Comfort Level making progress toward outcome         Problem: Patient Care Overview (Adult)  Goal: Plan of Care Review  Outcome: Ongoing (interventions implemented as appropriate)    12/19/17 1141   Coping/Psychosocial Response Interventions   Plan Of Care Reviewed With patient   Patient Care Overview   Progress no change   Outcome Evaluation   Outcome Summary/Follow up Plan c/o pain but sleeping. no distress noted. st, bp sl elevated. meds administered. will cont to monitor

## 2017-12-19 NOTE — PROGRESS NOTES
Dr. ANATOLIY Martinez    [address]    12/19/2017    Patient ID:  Name:  Silvia Zabala  MRN:  [id number]  1960  57 y.o.  female            CC/Reason for visit: Follow-up for DKA    HPI: Still complaining of some abdominal pain and nausea.  Her symptoms are a little better today than yesterday.  She did not like the food served to her earlier and she only had soup.    Vitals:  Vitals:    12/18/17 2300 12/19/17 0500 12/19/17 0700 12/19/17 1300   BP: 135/59  136/88 137/87   BP Location: Right arm  Right arm Right arm   Patient Position: Lying  Lying Lying   Pulse:   103 117   Resp: 20  20 20   Temp: 99.3 °F (37.4 °C)  98.3 °F (36.8 °C) 98.7 °F (37.1 °C)   TempSrc: Oral  Oral Oral   SpO2:   98% 96%   Weight:  79.4 kg (175 lb)     Height:               Body mass index is 27.82 kg/(m^2).    Intake/Output Summary (Last 24 hours) at 12/19/17 1425  Last data filed at 12/19/17 1300   Gross per 24 hour   Intake              880 ml   Output                0 ml   Net              880 ml       Exam:  GEN:  No distress, appears stated age  EYES:   EOM-i, anicteric sclera bilat  ENT:    External ears/nose normal, OP clear  NECK:  Supple, midline trachea  LUNGS: Clear breath sounds bilat, nonlabored breathing  CV:  Normal S1S2, without murmur, no edema  ABD:  Distended abdomen, mildly diffusely tender but nonspecific.  No rebound.  EXT:  No cyanosis or clubbing    Scheduled meds:    cholecalciferol 1,000 Units Oral Daily   cycloSPORINE 1 drop Both Eyes Q12H   DULoxetine 90 mg Oral Daily   enoxaparin 40 mg Subcutaneous Q24H   fluconazole 100 mg Intravenous Daily   insulin aspart 0-24 Units Subcutaneous 4x Daily With Meals & Nightly   insulin aspart 20 Units Subcutaneous TID With Meals   insulin detemir 80 Units Subcutaneous Nightly   levETIRAcetam 500 mg Oral BID   pantoprazole 40 mg Oral Q AM   pantoprazole 40 mg Oral Daily   pregabalin 75 mg Oral BID   sodium chloride 1,000 mL Intravenous Once     IV meds:                            Data Review:   I reviewed the patient's medications and new clinical results.  Lab Results   Component Value Date    CALCIUM 8.5 (L) 12/19/2017    PHOS 2.4 (L) 12/19/2017    MG 1.7 12/17/2017    MG 1.7 12/17/2017    MG 1.8 12/17/2017       Results from last 7 days  Lab Units 12/19/17  0340 12/18/17  0957 12/18/17  0405 12/17/17  1023  12/16/17  1650   SODIUM mmol/L 139 135*  --  138  < > 119*   POTASSIUM mmol/L 4.3 4.5  --  4.2  < > 6.0*   CHLORIDE mmol/L 106 106  --  105  < > 78*   CO2 mmol/L 19.7* 17.1*  --  20.8*  < > 18.4*   BUN mg/dL 3* 3*  --  6  < > 16   CREATININE mg/dL 0.66 0.71  --  0.70  < > 1.19*   CALCIUM mg/dL 8.5* 7.9*  --  8.0*  < > 9.2   BILIRUBIN mg/dL  --   --   --   --   --  1.2   ALK PHOS U/L  --   --   --   --   --  174*   ALT (SGPT) U/L  --   --   --   --   --  34*   AST (SGOT) U/L  --   --   --   --   --  26   GLUCOSE mg/dL 252* 327*  --  185*  < > 981*   WBC 10*3/mm3 3.03*  --  3.38*  --   --  5.63   HEMOGLOBIN g/dL 11.7*  --  11.3*  --   --  14.0   PLATELETS 10*3/mm3 63*  --  75*  --   --  124*   < > = values in this interval not displayed.                ASSESSMENT:   Candida esophagitis  Acute kidney injury    Cirrhosis of liver    Type 2 diabetes mellitus, uncontrolled, with neuropathy    DUKES (nonalcoholic steatohepatitis)    Hyperlipidemia    Gastroparesis    Diabetic ketoacidosis without coma associated with type 2 diabetes mellitus    Diabetic peripheral neuropathy    History of seizure disorder      PLAN:  Continue Diflucan for Candida esophagitis.  Give a total of 7 days.  Blood chemistries are improving, however her blood glucose is elevated again.  Endocrinology is following along.  They have made some adjustments to her insulin today.    She continues to have some nausea and upset stomach.  I'm hoping we can discharge her in the next 48 hours if we achieve adequate blood glucose control.      Juan Martinez MD  12/19/2017

## 2017-12-19 NOTE — PROGRESS NOTES
Discharge Planning Assessment  [facility]     Patient Name: Silvia Zabala  MRN: [id number]  Today's Date: 12/19/2017    Admit Date: 12/16/2017          Discharge Needs Assessment       12/19/17 1507    Living Environment    Lives With parent(s);spouse    Living Arrangements apartment    Home Accessibility stairs to enter home    Number of Stairs to Enter Home 8    Stair Railings at Home outside, present on right side    Type of Financial/Environmental Concern none    Transportation Available family or friend will provide;car    Living Environment Comment Pt states she will be moving into a new apartment after the first of the year, but location is unknown at this point.,     Living Environment    Provides Primary Care For no one, unable/limited ability to care for self    Primary Care Provided By spouse/significant other;parent(s)    Quality Of Family Relationships supportive;helpful;involved    Able to Return to Prior Living Arrangements yes    Discharge Needs Assessment    Concerns To Be Addressed denies needs/concerns at this time;no discharge needs identified    Readmission Within The Last 30 Days no previous admission in last 30 days    Anticipated Changes Related to Illness none    Equipment Currently Used at Home glucometer    Equipment Needed After Discharge none    Current Discharge Risk chronically ill    Discharge Disposition still a patient            Discharge Plan       12/19/17 1503    Case Management/Social Work Plan    Plan Home denies any dc needs.    Patient/Family In Agreement With Plan yes    Additional Comments CCP spoke with patient at bedside, introduced self and explained CCP role. Verified facesheet and confirmed [facility] is [facility]. Pt denies any problems with medications. Pt states she lives on a 2nd floor apartment with her spouse (Vitor Zabala [phone]) and her in laws, specifically mother in law Kary Zabala [phone]. She states she has great  support from them . Pt thinks she will be able to manage the 8 steps to get to her apartment when she is discharged. Pt denies any HH, SNF or DME. Pt states she has a cane but does not use it. Pt denies any dc needs or concerns. CCP explained will continue to follow. Sam DUKE/CCP        Discharge Placement     No information found                Demographic Summary       12/19/17 1501    Referral Information    Admission Type inpatient    Arrived From admitted as an inpatient    Referral Source admission list    Reason For Consult discharge planning    Record Reviewed medical record;patient profile    Contact Information    Permission Granted to Share Information With family/designee    Primary Care Physician Information    Name  Blanca Hong             Functional Status       12/19/17 1502    Functional Status Current    Ambulation 0-->independent    Transferring 0-->independent    Toileting 0-->independent    Bathing 0-->independent    Dressing 0-->independent    Eating 0-->independent    Communication 0-->understands/communicates without difficulty    Swallowing (if score 2 or more for any item, consult Rehab Services) 0-->swallows foods/liquids without difficulty    Change in Functional Status Since Onset of Current Illness/Injury no    Functional Status Prior    Ambulation 0-->independent    Transferring 0-->independent    Toileting 0-->independent    Bathing 0-->independent    Dressing 0-->independent    Eating 0-->independent    Communication 0-->understands/communicates without difficulty    Swallowing 0-->swallows foods/liquids without difficulty    IADL    Medications independent    Meal Preparation independent    Housekeeping independent    Laundry independent    Shopping independent    Oral Care independent    Activity Tolerance    Current Activity Limitations other (see comments)    Usual Activity Tolerance good    Current Activity Tolerance moderate    Cognitive/Perceptual/Developmental    Current  Mental Status/Cognitive Functioning no deficits noted    Recent Changes in Mental Status/Cognitive Functioning no changes    Employment/Financial    Source Of Income disability    Financial Concerns none            Psychosocial     None            Abuse/Neglect     None            Legal       12/19/17 1509    Legal    Legal Comments Pt denies having Living will or POA, offered more information, pt declined at this time. Sam RN/CCP            Substance Abuse     None            Patient Forms     None          Renee Rush, RN

## 2017-12-20 VITALS
TEMPERATURE: 98.5 F | HEIGHT: 67 IN | SYSTOLIC BLOOD PRESSURE: 134 MMHG | HEART RATE: 104 BPM | BODY MASS INDEX: 27.33 KG/M2 | RESPIRATION RATE: 18 BRPM | WEIGHT: 174.1 LBS | OXYGEN SATURATION: 96 % | DIASTOLIC BLOOD PRESSURE: 86 MMHG

## 2017-12-20 LAB
DEPRECATED RDW RBC AUTO: 49.2 FL (ref 37–54)
ERYTHROCYTE [DISTWIDTH] IN BLOOD BY AUTOMATED COUNT: 15.3 % (ref 11.7–13)
GAD65 AB SER-ACNC: <5 U/ML (ref 0–5)
GLUCOSE BLDC GLUCOMTR-MCNC: 208 MG/DL (ref 70–130)
GLUCOSE BLDC GLUCOMTR-MCNC: 80 MG/DL (ref 70–130)
HCT VFR BLD AUTO: 33.1 % (ref 35.6–45.5)
HGB BLD-MCNC: 11.1 G/DL (ref 11.9–15.5)
MCH RBC QN AUTO: 29.4 PG (ref 26.9–32)
MCHC RBC AUTO-ENTMCNC: 33.5 G/DL (ref 32.4–36.3)
MCV RBC AUTO: 87.6 FL (ref 80.5–98.2)
PLATELET # BLD AUTO: 61 10*3/MM3 (ref 140–500)
PMV BLD AUTO: 10.2 FL (ref 6–12)
RBC # BLD AUTO: 3.78 10*6/MM3 (ref 3.9–5.2)
WBC NRBC COR # BLD: 3.35 10*3/MM3 (ref 4.5–10.7)

## 2017-12-20 PROCEDURE — 82962 GLUCOSE BLOOD TEST: CPT

## 2017-12-20 PROCEDURE — 63710000001 INSULIN ASPART PER 5 UNITS: Performed by: INTERNAL MEDICINE

## 2017-12-20 PROCEDURE — 99233 SBSQ HOSP IP/OBS HIGH 50: CPT | Performed by: INTERNAL MEDICINE

## 2017-12-20 PROCEDURE — 25010000002 FLUCONAZOLE PER 200 MG: Performed by: INTERNAL MEDICINE

## 2017-12-20 PROCEDURE — 85027 COMPLETE CBC AUTOMATED: CPT | Performed by: INTERNAL MEDICINE

## 2017-12-20 RX ADMIN — INSULIN ASPART 8 UNITS: 100 INJECTION, SOLUTION INTRAVENOUS; SUBCUTANEOUS at 11:48

## 2017-12-20 RX ADMIN — LEVETIRACETAM 500 MG: 500 TABLET, FILM COATED ORAL at 09:02

## 2017-12-20 RX ADMIN — PREGABALIN 75 MG: 75 CAPSULE ORAL at 09:02

## 2017-12-20 RX ADMIN — VITAMIN D, TAB 1000IU (100/BT) 1000 UNITS: 25 TAB at 09:03

## 2017-12-20 RX ADMIN — OXYCODONE HYDROCHLORIDE 5 MG: 5 TABLET ORAL at 11:49

## 2017-12-20 RX ADMIN — CYCLOSPORINE 1 DROP: 0.5 EMULSION OPHTHALMIC at 09:02

## 2017-12-20 RX ADMIN — PANTOPRAZOLE SODIUM 40 MG: 40 TABLET, DELAYED RELEASE ORAL at 06:35

## 2017-12-20 RX ADMIN — FLUCONAZOLE 100 MG: 2 INJECTION INTRAVENOUS at 12:47

## 2017-12-20 RX ADMIN — DULOXETINE HYDROCHLORIDE 90 MG: 60 CAPSULE, DELAYED RELEASE ORAL at 09:02

## 2017-12-20 RX ADMIN — PANTOPRAZOLE SODIUM 40 MG: 40 TABLET, DELAYED RELEASE ORAL at 09:02

## 2017-12-20 NOTE — PLAN OF CARE
Problem: Diabetes, Type 2 (Adult)  Goal: Signs and Symptoms of Listed Potential Problems Will be Absent or Manageable (Diabetes, Type 2)  Outcome: Outcome(s) achieved Date Met: 12/20/17      Problem: Fall Risk (Adult)  Goal: Absence of Falls  Outcome: Ongoing (interventions implemented as appropriate)      Problem: Pain, Chronic (Adult)  Goal: Acceptable Pain Control/Comfort Level  Outcome: Ongoing (interventions implemented as appropriate)      Problem: Patient Care Overview (Adult)  Goal: Plan of Care Review  Outcome: Ongoing (interventions implemented as appropriate)   12/20/17 0452   Coping/Psychosocial Response Interventions   Plan Of Care Reviewed With patient   Patient Care Overview   Progress progress toward functional goals as expected   Outcome Evaluation   Outcome Summary/Follow up Plan A&Ox4. Continues with concerns r/t abdominal distention but decreased from yesterday. Carbonation /walking and pain medication work well. Slept well throughout night.     Goal: Discharge Needs Assessment  Outcome: Ongoing (interventions implemented as appropriate)

## 2017-12-20 NOTE — DISCHARGE SUMMARY
Patient Identification:  Name: Silvia Zabala  Age: 57 y.o.  Sex: female  :  1960  MRN: [id number]  Primary Care Physician: Blanca Pitts MD    Admit date: 2017  Discharge date and time: 2017   Discharged Condition: good    Discharge Diagnoses:   Candida esophagitis  Acute kidney injury    Cirrhosis of liver    Type 2 diabetes mellitus, uncontrolled, with neuropathy    DUKES (nonalcoholic steatohepatitis)    Hyperlipidemia    Gastroparesis    Diabetic ketoacidosis without coma associated with type 2 diabetes mellitus    Diabetic peripheral neuropathy    History of seizure disorder      Hospital Course: Silvia Zabala presented to [facility] as a 57-year-old female who presents with nausea and vomiting.  This started about 10 days ago.  It is still present.  She has a history of gastroparesis.  It's not alleviated by anything.  She has tried Reglan at home.  It is exacerbated by recent diagnoses of Candida esophagitis.  She underwent EGD which revealed Candida esophagitis.  She denies any fever or chills.  She's been vomiting and her blood sugars greater than 900.  The problem is still present.  She is now responding to some IV fluids and IV insulin in the ER.  I discussed the case directly with Dr. Montelongo.  I reviewed recent discharge summary dictated by Dr. Mayur Zavala on 2017.  The patient has a history of upper GI bleed, esophageal varices, portal hypertension, nonalcoholic steatohepatitis, type 2 diabetes, rheumatoid arthritis and cirrhosis of the liver.    She was admitted to the intensive care unit and treated with insulin drip and IV fluids following DKA protocol.  Endocrinology was consulted and they have adjusted her insulin dosages as written below in her medication reconciliation form.  She has done very well.  Her electrolytes improved and her blood glucose levels have become acceptable.  She has tolerated several meals over the past 48  hours and has benefited maximally from inpatient care and is ready for discharge home.    Her Enbrel was stopped.  She has rheumatoid arthritis but due to acute DKA and severe illness this medication needs to be postponed for a couple of weeks until she has fully recovered.    She was given Diflucan for Candida esophagitis which was recently diagnosed.  She received 4 days of IV Diflucan for a total of 400 mg of Diflucan which should be sufficient to have treated the candidiasis of the esophagus.  She denies any ongoing difficulty swallowing.  She does have some chronic abdominal pain because she has cirrhosis but that is now at baseline.    She does have history of cirrhosis.  Her platelet count is usually low.  This needs to be checked again within a week.  She has not had any clinical signs of bleeding.    Her kidney function was abnormal when she was admitted.  It normalized quickly after IV fluids.    She has done quite well and is being discharged home today with follow-up in one week with her endocrinologist.    Consults:   IP CONSULT TO PULMONOLOGY  IP CONSULT TO NUTRITION SERVICES  IP CONSULT TO DIABETES EDUCATOR  IP CONSULT TO ENDOCRINOLOGY  IP CONSULT TO GASTROENTEROLOGY    Significant Diagnostic Studies:   Imaging Results (most recent)     Procedure Component Value Units Date/Time    CT Abdomen Pelvis Without Contrast [[id number]] Collected:  12/19/17 1928     Updated:  12/19/17 1956    Narrative:       CT ABDOMEN PELVIS WITHOUT CONTRAST     HISTORY: Abdominal pain. Cirrhosis. Nausea.     TECHNIQUE: Exam obtained 12/17/2017 at 4:11 PM was originally reviewed  by Dr. Sampson though due to technical reasons was subsequently  submitted to me for interpretation on 12/19/2017 at 6 PM. This exam  includes CT images through the abdomen and pelvis without IV contrast  but with the use of oral contrast.     COMPARISON: CT abdomen and pelvis with IV contrast 10/30/2017.     FINDINGS: As compared to the prior exam  10/30/2017 there is developed  mild ascites. There is perihepatic fluid and fluid extends into the  pelvis. There is also central mesenteric stranding. Stranding surrounds  the descending portion of the duodenum. Stranding may be due to  inflammation or ascites. Stranding surrounds the pancreas. There is  hepatic cirrhotic morphology and evidence for portal venous  hypertension. Splenic varices are present and there is splenomegaly  similar to the previous exam. Chronic pancreatic atrophy is evident.  Oral contrast is present extending to the rectum. There is equivocal  wall thickening involving the segments of the colon, particularly  involving the right colon and the transverse colon and rectosigmoid  colon.  These segments of the colon are decompressed. There is no  abscess or fluid collection. Atherosclerotic calcification is present  involving the abdominal aorta and iliac vasculature. Normal appendix is  present.       Impression:       1. Mild ascites has developed since the prior exam 10/30/2017. There is  perihepatic fluid and fluid extends into the pelvis. Central mesenteric  stranding is present and there is stranding surrounding the descending  portion of the duodenum and the pancreas that may be associated with  ascites or inflammation. Pancreatitis and duodenitis could be considered  in the proper clinical setting.  2. Hepatic cirrhosis with portal venous hypertension.  3. Equivocal colonic wall thickening may represent colitis. Segments of  the colon are decompressed limiting evaluation for wall thickening.     Radiation dose reduction techniques were utilized, including automated  exposure control and exposure modulation based on body size.     This report was finalized on 12/19/2017 7:53 PM by Dr. Darinel Madrigal MD.                   TEST  RESULTS PENDING AT DISCHARGE   Order Current Status    Glutamic Acid Decarboxylase In process    POC Glucose Once In process    POC Glucose Once In process           Discharge Exam:  Alert and oriented x 4, in NAD  Supple neck, midline trach  RRR, no m/r/g, no edema  Clear bilaterally, no wheezing, nonlabored  No clubbing or cyanosis     Disposition:  Home    Patient Instructions:        Your medication list      CHANGE how you take these medications       Instructions Last Dose Given Next Dose Due    insulin aspart 100 UNIT/ML injection   Commonly known as:  novoLOG   What changed:    - Another medication with the same name was added. Make sure you understand how and when to take each.  - Another medication with the same name was changed. Make sure you understand how and when to take each.              insulin aspart 100 UNIT/ML injection   Commonly known as:  novoLOG   What changed:  You were already taking a medication with the same name, and this prescription was added. Make sure you understand how and when to take each.        Inject 0-10 Units under the skin 4 (Four) Times a Day With Meals & at Bedtime.         insulin aspart 100 UNIT/ML injection   Commonly known as:  novoLOG   What changed:    - how much to take  - how to take this  - when to take this  - additional instructions        15 U w brkfst, 20 U w lunch, 20 U w dinner           CONTINUE taking these medications       Instructions Last Dose Given Next Dose Due    ALPRAZolam 0.5 MG tablet   Commonly known as:  XANAX        Take 1 tablet by mouth 2 (Two) Times a Day As Needed for Anxiety.         cholecalciferol 1000 units tablet   Commonly known as:  VITAMIN D3              cycloSPORINE 0.05 % ophthalmic emulsion   Commonly known as:  RESTASIS              DULoxetine 30 MG capsule   Commonly known as:  CYMBALTA              folic acid 1 MG tablet   Commonly known as:  FOLVITE              glucose blood test strip   Commonly known as:  ONE TOUCH ULTRA TEST        Test blood sugar 4 times daily         insulin glargine 100 UNIT/ML injection   Commonly known as:  LANTUS              Insulin Pen Needle 31G X 5  MM misc        To inject 5 -6 times per day         levETIRAcetam 500 MG tablet   Commonly known as:  KEPPRA              metFORMIN 1000 MG tablet   Commonly known as:  GLUCOPHAGE              MULTI-VITAMIN PO              OXAYDO 7.5 MG tablet    Generic drug:  OxyCODONE HCl              pregabalin 75 MG capsule   Commonly known as:  LYRICA        Take 1 capsule by mouth 2 (Two) Times a Day.         rifaximin 550 MG tablet   Commonly known as:  XIFAXAN              spironolactone 100 MG tablet   Commonly known as:  ALDACTONE        Take 1 tablet by mouth Daily.         vitamin B-12 1000 MCG tablet   Commonly known as:  CYANOCOBALAMIN                STOP taking these medications          buPROPion  MG 24 hr tablet   Commonly known as:  WELLBUTRIN XL           clotrimazole 10 MG omer   Commonly known as:  MYCELEX           cyclobenzaprine 5 MG tablet   Commonly known as:  FLEXERIL           diphenhydrAMINE 25 mg capsule   Commonly known as:  BENADRYL           docusate sodium 100 MG capsule   Commonly known as:  COLACE           Etanercept 50 MG/ML solution auto-injector           ferrous sulfate 325 (65 FE) MG tablet           Melatonin 10 MG tablet           metoclopramide 10 MG tablet   Commonly known as:  REGLAN           pantoprazole 40 MG EC tablet   Commonly known as:  PROTONIX           pravastatin 40 MG tablet   Commonly known as:  PRAVACHOL           promethazine 25 MG tablet   Commonly known as:  PHENERGAN           sucralfate 1 g tablet   Commonly known as:  CARAFATE                Where to Get Your Medications      These medications were sent to [facility] AT [facility] - [phone]  - [phone]   [address] , [facility] [address]     Phone:  [phone]    • insulin aspart 100 UNIT/ML injection   • insulin aspart 100 UNIT/ML injection                 Medication Reconciliation: Please see electronically  completed Med Rec.    Total time spent discharging patient including evaluation, medication reconciliation, arranging follow up, and post hospitalization instructions and education total time exceeds 30 minutes.    Signed:  Juan Martinez MD  12/20/2017  2:09 PM

## 2017-12-20 NOTE — NURSING NOTE
Ambulated around nursing unit following c/o abdominal distention from dx of gastroparesis. Educational material given regarding limiting high fiber food from diet. Carbonated beverage at bedside and utilizing to help relieve distention. Following ambulation, medicated with pain med and xanax per prn order.

## 2017-12-20 NOTE — PROGRESS NOTES
Adult Nutrition  Assessment/PES    Patient Name:  Silvia Zabala  YOB: 1960  MRN: [id number]  Admit Date:  12/16/2017    Assessment Date:  12/20/2017    Comments:  Spoke with pt. States struggles with meal planning due to gastroparesis and vomiting.  Hoping new medication from [facility](?) will help.  DC noted.          Reason for Assessment       12/20/17 1417    Reason for Assessment    Reason For Assessment/Visit follow up protocol;education      Problem/Interventions:        Intervention Goal       12/20/17 1417    Intervention Goal    General Provide information regarding MNT for treatment/condition              Education/Evaluation       12/20/17 1417    Education    Education Education offered and refused   see note        Electronically signed by:  Sophia Farrar RD  12/20/17 2:18 PM

## 2017-12-20 NOTE — PROGRESS NOTES
57 y.o.   LOS: 4 days   Patient Care Team:  Blanca Pitts MD as PCP - General (Internal Medicine)  Sukhdeep Mcdowell MD as Consulting Physician (Hematology and Oncology)  Blanca Pitts MD as Referring Physician (Internal Medicine)    Chief Complaint:  Hyperglycemia    Chief Complaint   Patient presents with   • Vomiting   • Diarrhea       Subjective no major overnight events.  Patient tolerating diet well.  Blood sugars decently controlled.    Interval History:    Review of Systems:   Review of Systems   Constitutional: Positive for appetite change. Negative for fatigue.   Gastrointestinal: Negative for constipation, nausea and vomiting.   Musculoskeletal: Positive for back pain and myalgias.   Skin: Negative for wound.   Neurological: Positive for weakness and numbness.     Objective     Vital Signs   Temp:  [98.4 °F (36.9 °C)-99.3 °F (37.4 °C)] 98.7 °F (37.1 °C)  Heart Rate:  [] 104  Resp:  [18-20] 18  BP: (121-140)/(69-87) 140/85      Physical Exam   Alert and oriented ×3  Acanthosis nigricans noted  Clear to auscultation  No abdominal tenderness, bowel sounds heard, no lipodystrophy  No edema, Hammer toes noted, intact pin prick    Results Review:     I reviewed the patient's new clinical results.      Glucose   Date/Time Value Ref Range Status   12/19/2017 0340 252 (H) 65 - 99 mg/dL Final   12/18/2017 0957 327 (H) 65 - 99 mg/dL Final     Lab Results (last 72 hours)     Procedure Component Value Units Date/Time    CBC & Differential [[id number]] Collected:  12/16/17 1650    Specimen:  Blood Updated:  12/16/17 1702    Narrative:       The following orders were created for panel order CBC & Differential.  Procedure                               Abnormality         Status                     ---------                               -----------         ------                     CBC Auto Differential[[id number]]        Abnormal            Final result                 Please view results for these tests on the  individual orders.    CBC Auto Differential [[id number]]  (Abnormal) Collected:  12/16/17 1650    Specimen:  Blood Updated:  12/16/17 1702     WBC 5.63 10*3/mm3      RBC 4.71 10*6/mm3      Hemoglobin 14.0 g/dL      Hematocrit 42.2 %      MCV 89.6 fL      MCH 29.7 pg      MCHC 33.2 g/dL      RDW 15.3 (H) %      RDW-SD 49.5 fl      MPV 10.7 fL      Platelets 124 (L) 10*3/mm3      Neutrophil % 74.2 %      Lymphocyte % 17.1 (L) %      Monocyte % 6.6 %      Eosinophil % 1.1 %      Basophil % 0.5 %      Immature Grans % 0.5 %      Neutrophils, Absolute 4.18 10*3/mm3      Lymphocytes, Absolute 0.96 10*3/mm3      Monocytes, Absolute 0.37 10*3/mm3      Eosinophils, Absolute 0.06 10*3/mm3      Basophils, Absolute 0.03 10*3/mm3      Immature Grans, Absolute 0.03 10*3/mm3     Lipase [[id number]]  (Normal) Collected:  12/16/17 1650    Specimen:  Blood Updated:  12/16/17 1722     Lipase 22 U/L     Comprehensive Metabolic Panel [[id number]]  (Abnormal) Collected:  12/16/17 1650    Specimen:  Blood Updated:  12/16/17 1741     Glucose 981 (C) mg/dL      BUN 16 mg/dL      Creatinine 1.19 (H) mg/dL      Sodium 119 (C) mmol/L      Potassium 6.0 (H) mmol/L      Chloride 78 (L) mmol/L      CO2 18.4 (L) mmol/L      Calcium 9.2 mg/dL      Total Protein 8.3 g/dL      Albumin 3.90 g/dL      ALT (SGPT) 34 (H) U/L      AST (SGOT) 26 U/L      Alkaline Phosphatase 174 (H) U/L      Total Bilirubin 1.2 mg/dL      eGFR Non African Amer 47 (L) mL/min/1.73      Globulin 4.4 gm/dL      A/G Ratio 0.9 g/dL      BUN/Creatinine Ratio 13.4     Anion Gap 22.6 mmol/L     POC Glucose Once [[id number]]  (Abnormal) Collected:  12/16/17 1737    Specimen:  Blood Updated:  12/16/17 1743     Glucose >599 (C) mg/dL     Narrative:       Treated Patient Meter: [id number] : [id number] Frederic Keyshawn    [place] Draw [[id number]] Collected:  12/16/17 1650    Specimen:  Blood Updated:  12/16/17 1801    Narrative:       The following orders were created for panel order  [place] Draw.  Procedure                               Abnormality         Status                     ---------                               -----------         ------                     Light Blue Top[[id number]]                                   Final result               Green Top (Gel)[[id number]]                                  Final result               Lavender Top[[id number]]                                     Final result               Gold Top - SST[[id number]]                                   Final result                 Please view results for these tests on the individual orders.    Light Blue Top [[id number]] Collected:  12/16/17 1650    Specimen:  Blood Updated:  12/16/17 1801     Extra Tube hold for add-on      Auto resulted       Green Top (Gel) [[id number]] Collected:  12/16/17 1650    Specimen:  Blood Updated:  12/16/17 1801     Extra Tube Hold for add-ons.      Auto resulted.       Lavender Top [[id number]] Collected:  12/16/17 1650    Specimen:  Blood Updated:  12/16/17 1801     Extra Tube hold for add-on      Auto resulted       Gold Top - SST [[id number]] Collected:  12/16/17 1650    Specimen:  Blood Updated:  12/16/17 1801     Extra Tube Hold for add-ons.      Auto resulted.       Blood Gas, Arterial [[id number]]  (Abnormal) Collected:  12/16/17 1809    Specimen:  Arterial Blood Updated:  12/16/17 1811     Site Arterial: right brachial     Mayur's Test N/A     pH, Arterial 7.454 (H) pH units      pCO2, Arterial 24.2 (L) mm Hg      pO2, Arterial 76.9 (L) mm Hg      HCO3, Arterial 17.0 (L) mmol/L      Base Excess, Arterial -5.3 (L) mmol/L      O2 Saturation Calculated 96.2 %      Barometric Pressure for Blood Gas 752.1 mmHg      Modality Room Air     Rate 28 Breaths/minute     Narrative:       % Meter: [id number] : [id number] Ryley Schumacher    Clostridium Difficile Toxin - Stool, Per Rectum [[id number]] Collected:  12/16/17 1707    Specimen:  Stool from Per Rectum Updated:  12/16/17  1831    Narrative:       The following orders were created for panel order Clostridium Difficile Toxin - Stool, Per Rectum.  Procedure                               Abnormality         Status                     ---------                               -----------         ------                     Clostridium Difficile To...[[id number]]  Normal              Final result                 Please view results for these tests on the individual orders.    Clostridium Difficile Toxin, PCR - Stool, Per Rectum [[id number]]  (Normal) Collected:  12/16/17 1707    Specimen:  Stool from Per Rectum Updated:  12/16/17 1831     C. Difficile Toxins by PCR Negative    Urinalysis With / Culture If Indicated - Urine, Clean Catch [[id number]]  (Abnormal) Collected:  12/16/17 1823    Specimen:  Urine from Urine, Clean Catch Updated:  12/16/17 1851     Color, UA Yellow     Appearance, UA Clear     pH, UA 6.0     Specific Gravity, UA >=1.030     Glucose, UA >=1000 mg/dL (3+) (A)     Ketones, UA 15 mg/dL (1+) (A)     Bilirubin, UA Negative     Blood, UA Negative     Protein, UA Negative     Leuk Esterase, UA Negative     Nitrite, UA Negative     Urobilinogen, UA 0.2 E.U./dL    Narrative:       Urine microscopic not indicated.    POC Glucose Once [[id number]]  (Abnormal) Collected:  12/16/17 1855    Specimen:  Blood Updated:  12/16/17 1856     Glucose >599 (C) mg/dL     Narrative:       Confirmed by Repeat Meter: [id number] : [id number] Reji Li RN    Phosphorus [[id number]]  (Abnormal) Collected:  12/16/17 1839    Specimen:  Blood Updated:  12/16/17 1909     Phosphorus 1.6 (L) mg/dL     Magnesium [[id number]]  (Normal) Collected:  12/16/17 1839    Specimen:  Blood Updated:  12/16/17 1909     Magnesium 1.9 mg/dL     Calcium, Ionized [[id number]]  (Normal) Collected:  12/16/17 1839    Specimen:  Blood Updated:  12/16/17 1913     Ionized Calcium 1.16 mmol/L      Ionized Calcium 4.6 mg/dL     Beta Hydroxybutyrate Quantitative [[id number]]   (Abnormal) Collected:  12/16/17 1839    Specimen:  Blood Updated:  12/16/17 1922     Beta-Hydroxybutyrate Quant 1.610 (H) mmol/L     Basic Metabolic Panel [[id number]]  (Abnormal) Collected:  12/16/17 1839    Specimen:  Blood Updated:  12/16/17 1922     Glucose 842 (C) mg/dL      BUN 16 mg/dL      Creatinine 1.11 (H) mg/dL      Sodium 126 (L) mmol/L      Potassium 4.1 mmol/L      Chloride 85 (L) mmol/L      CO2 18.2 (L) mmol/L      Calcium 8.5 (L) mg/dL      eGFR Non African Amer 51 (L) mL/min/1.73      BUN/Creatinine Ratio 14.4     Anion Gap 22.8 mmol/L     Narrative:       GFR Normal >60  Chronic Kidney Disease <60  Kidney Failure <15    Hemoglobin A1c [[id number]]  (Abnormal) Collected:  12/16/17 1650    Specimen:  Blood Updated:  12/16/17 1941     Hemoglobin A1C 11.20 (H) %     Narrative:       Hemoglobin A1C Ranges:    Increased Risk for Diabetes  5.7% to 6.4%  Diabetes                     >= 6.5%  Diabetic Goal                < 7.0%    POC Glucose Once [[id number]]  (Abnormal) Collected:  12/16/17 2005    Specimen:  Blood Updated:  12/16/17 2007     Glucose >599 (C) mg/dL     Narrative:       Treated Patient Meter: [id number] : [id number] Moisés Parisi    POC Glucose Once [[id number]]  (Abnormal) Collected:  12/16/17 2012    Specimen:  Blood Updated:  12/16/17 2016     Glucose >599 (C) mg/dL     Narrative:       Confirmed by Repeat Meter: [id number] : [id number] Marco Antonio Oviedo [facility]    POC Glucose Once [[id number]]  (Abnormal) Collected:  12/16/17 2028    Specimen:  Blood Updated:  12/16/17 2039     Glucose >599 (C) mg/dL     Narrative:       Confirmed by Repeat Meter: [id number] : [id number] Shoshana Pool    Glucose, Random [[id number]]  (Abnormal) Collected:  12/16/17 2036    Specimen:  Blood Updated:  12/16/17 2110     Glucose 717 (C) mg/dL     Calcium, Ionized [[id number]]  (Normal) Collected:  12/16/17 2036    Specimen:  Blood Updated:  12/16/17 2113     Ionized Calcium 1.18 mmol/L      Ionized  Calcium 4.7 mg/dL     POC Glucose Once [[id number]]  (Abnormal) Collected:  12/16/17 2212    Specimen:  Blood Updated:  12/16/17 2223     Glucose 570 (C) mg/dL     Narrative:       Meter: [id number] : [id number] Jarrell PCA    POC Glucose Once [[id number]]  (Abnormal) Collected:  12/16/17 2309    Specimen:  Blood Updated:  12/16/17 2320     Glucose 516 (C) mg/dL     Narrative:       Meter: [id number] : [id number] Jarrell PCA    POC Glucose Once [[id number]]  (Abnormal) Collected:  12/17/17 0010    Specimen:  Blood Updated:  12/17/17 0018     Glucose 377 (H) mg/dL     Narrative:       Meter: [id number] : [id number] Jarrell PCA    POC Glucose Once [[id number]]  (Abnormal) Collected:  12/17/17 0011    Specimen:  Blood Updated:  12/17/17 0018     Glucose 441 (H) mg/dL     Narrative:       Meter: [id number] : [id number] Jarrell PCA    Phosphorus [[id number]]  (Abnormal) Collected:  12/17/17 0011    Specimen:  Blood Updated:  12/17/17 0044     Phosphorus 1.4 (L) mg/dL     Magnesium [[id number]]  (Normal) Collected:  12/17/17 0011    Specimen:  Blood Updated:  12/17/17 0044     Magnesium 1.8 mg/dL     Calcium, Ionized [[id number]]  (Normal) Collected:  12/17/17 0011    Specimen:  Blood Updated:  12/17/17 0048     Ionized Calcium 1.19 mmol/L      Ionized Calcium 4.8 mg/dL     Basic Metabolic Panel [[id number]]  (Abnormal) Collected:  12/17/17 0011    Specimen:  Blood Updated:  12/17/17 0053     Glucose 443 (C) mg/dL      BUN 11 mg/dL      Creatinine 0.76 mg/dL      Sodium 135 (L) mmol/L      Potassium 3.7 mmol/L      Chloride 98 mmol/L      CO2 23.0 mmol/L      Calcium 8.6 mg/dL      eGFR Non African Amer 79 mL/min/1.73      BUN/Creatinine Ratio 14.5     Anion Gap 14.0 mmol/L     Narrative:       GFR Normal >60  Chronic Kidney Disease <60  Kidney Failure <15    POC Glucose Once [[id number]]  (Abnormal) Collected:  12/17/17 0100    Specimen:  Blood Updated:  12/17/17 0120     Glucose 365 (H) mg/dL      Narrative:       Meter: [id number] : [id number] Gerson Jarrell PCA    POC Glucose Once [[id number]]  (Abnormal) Collected:  12/17/17 0210    Specimen:  Blood Updated:  12/17/17 0219     Glucose 335 (H) mg/dL     Narrative:       Meter: [id number] : [id number] Tino Faye    POC Glucose Once [[id number]]  (Abnormal) Collected:  12/17/17 0307    Specimen:  Blood Updated:  12/17/17 0332     Glucose 323 (H) mg/dL     Narrative:       Meter: [id number] : [id number] Tino Danell    POC Glucose Once [[id number]]  (Abnormal) Collected:  12/17/17 0403    Specimen:  Blood Updated:  12/17/17 0408     Glucose 300 (H) mg/dL     Narrative:       Meter: [id number] : [id number] Gerson Jarrell PCA    Phosphorus [[id number]]  (Abnormal) Collected:  12/17/17 0401    Specimen:  Blood Updated:  12/17/17 0452     Phosphorus 1.7 (L) mg/dL     Basic Metabolic Panel [[id number]]  (Abnormal) Collected:  12/17/17 0401    Specimen:  Blood Updated:  12/17/17 0452     Glucose 287 (H) mg/dL      BUN 9 mg/dL      Creatinine 0.69 mg/dL      Sodium 137 mmol/L      Potassium 3.8 mmol/L      Chloride 102 mmol/L      CO2 21.1 (L) mmol/L      Calcium 8.2 (L) mg/dL      eGFR Non African Amer 88 mL/min/1.73      BUN/Creatinine Ratio 13.0     Anion Gap 13.9 mmol/L     Narrative:       GFR Normal >60  Chronic Kidney Disease <60  Kidney Failure <15    Magnesium [[id number]]  (Normal) Collected:  12/17/17 0401    Specimen:  Blood Updated:  12/17/17 0452     Magnesium 1.7 mg/dL     Calcium, Ionized [[id number]]  (Normal) Collected:  12/17/17 0401    Specimen:  Blood Updated:  12/17/17 0516     Ionized Calcium 1.16 mmol/L      Ionized Calcium 4.6 mg/dL     POC Glucose Once [[id number]]  (Abnormal) Collected:  12/17/17 0501    Specimen:  Blood Updated:  12/17/17 0522     Glucose 272 (H) mg/dL     Narrative:       Meter: [id number] : [id number] Gerson NOE    POC Glucose Once [[id number]]  (Abnormal) Collected:  12/17/17 0602     Specimen:  Blood Updated:  12/17/17 0622     Glucose 247 (H) mg/dL     Narrative:       Meter: [id number] : [id number] Gerson Jarrell PCA    Potassium [[id number]]  (Normal) Collected:  12/17/17 0606    Specimen:  Blood Updated:  12/17/17 0652     Potassium 3.9 mmol/L     POC Glucose Once [[id number]]  (Abnormal) Collected:  12/17/17 0659    Specimen:  Blood Updated:  12/17/17 0719     Glucose 259 (H) mg/dL     Narrative:       Meter: [id number] : [id number] Gerson Jarrell PCA    POC Glucose Once [[id number]]  (Abnormal) Collected:  12/17/17 0834    Specimen:  Blood Updated:  12/17/17 0837     Glucose 200 (H) mg/dL     Narrative:       Meter: [id number] : [id number] Derek Goel RN    Calcium, Ionized [[id number]]  (Abnormal) Collected:  12/17/17 0832    Specimen:  Blood Updated:  12/17/17 0915     Ionized Calcium 1.08 (L) mmol/L      Ionized Calcium 4.3 (L) mg/dL     Magnesium [[id number]]  (Normal) Collected:  12/17/17 0832    Specimen:  Blood Updated:  12/17/17 0917     Magnesium 1.7 mg/dL     Phosphorus [[id number]]  (Abnormal) Collected:  12/17/17 0832    Specimen:  Blood Updated:  12/17/17 0917     Phosphorus 1.3 (L) mg/dL     POC Glucose Once [[id number]]  (Abnormal) Collected:  12/17/17 1019    Specimen:  Blood Updated:  12/17/17 1024     Glucose 179 (H) mg/dL     Narrative:       Meter: [id number] : [id number] Derek Goel RN    Giardia / Cryptosporidium Screen - Stool, Per Rectum [[id number]]  (Normal) Collected:  12/16/17 1707    Specimen:  Stool from Per Rectum Updated:  12/17/17 1032     Cryptosporidium Antigen Negative     Giardia Antigen, Stool Negative    Fecal Lactoferrin - Stool, Per Rectum [[id number]]  (Normal) Collected:  12/16/17 1707    Specimen:  Stool from Per Rectum Updated:  12/17/17 1033     Lactoferrin, Qual Negative    Basic Metabolic Panel [[id number]]  (Abnormal) Collected:  12/17/17 1023    Specimen:  Blood Updated:  12/17/17 1102     Glucose 185 (H) mg/dL      BUN 6 mg/dL       Creatinine 0.70 mg/dL      Sodium 138 mmol/L      Potassium 4.2 mmol/L      Chloride 105 mmol/L      CO2 20.8 (L) mmol/L      Calcium 8.0 (L) mg/dL      eGFR Non African Amer 87 mL/min/1.73      BUN/Creatinine Ratio 8.6     Anion Gap 12.2 mmol/L     Narrative:       GFR Normal >60  Chronic Kidney Disease <60  Kidney Failure <15    Fecal Fat, Qualitative - Stool, Per Rectum [[id number]]  (Normal) Collected:  12/16/17 1707    Specimen:  Stool from Per Rectum Updated:  12/17/17 1205     Fecal Fats, Qualitative, Fatty Acids 0-100 fat droplets / hpf     Fecal Fat Qualitative, Neutral Fats 0-50 fat droplets / hpf    POC Glucose Once [[id number]]  (Abnormal) Collected:  12/17/17 1239    Specimen:  Blood Updated:  12/17/17 1240     Glucose 241 (H) mg/dL     Narrative:       Meter: [id number] : [id number] Hong Gregory    POC Glucose Once [[id number]]  (Abnormal) Collected:  12/17/17 1558    Specimen:  Blood Updated:  12/17/17 1559     Glucose 216 (H) mg/dL     Narrative:       Meter: [id number] : [id number] Hong Gregory    POC Glucose Once [[id number]]  (Abnormal) Collected:  12/17/17 2043    Specimen:  Blood Updated:  12/17/17 2048     Glucose 198 (H) mg/dL     Narrative:       Meter: [id number] : [id number] NOE    POC Glucose Once [[id number]]  (Abnormal) Collected:  12/17/17 2356    Specimen:  Blood Updated:  12/18/17 0001     Glucose 218 (H) mg/dL     Narrative:       Meter: [id number] : [id number] NOE    Microalbumin / Creatinine Urine Ratio - [[id number]] Collected:  12/17/17 2358    Specimen:  Urine Updated:  12/18/17 0123     Microalbumin/Creatinine Ratio -- mg/g       Unable to calculate        Creatinine, Urine 15.0 mg/dL      Microalbumin, Urine <1.2 mg/L     POC Glucose Once [[id number]]  (Normal) Collected:  12/18/17 0401    Specimen:  Blood Updated:  12/18/17 0413     Glucose 116 mg/dL     Narrative:       Meter: [id number] : [id number] Gerson NOE    Glutamic  Acid Decarboxylase [[id number]] Collected:  12/18/17 0405    Specimen:  Blood Updated:  12/18/17 0431    C-Peptide [[id number]] Collected:  12/18/17 0405    Specimen:  Blood Updated:  12/18/17 0431    CBC (No Diff) [[id number]]  (Abnormal) Collected:  12/18/17 0405    Specimen:  Blood Updated:  12/18/17 0454     WBC 3.38 (L) 10*3/mm3      RBC 3.86 (L) 10*6/mm3      Hemoglobin 11.3 (L) g/dL      Hematocrit 33.5 (L) %      MCV 86.8 fL      MCH 29.3 pg      MCHC 33.7 g/dL      RDW 15.8 (H) %      RDW-SD 49.7 fl      MPV 10.5 fL      Platelets 75 (L) 10*3/mm3     Lipid Panel [[id number]]  (Abnormal) Collected:  12/18/17 0405    Specimen:  Blood Updated:  12/18/17 0504     Total Cholesterol 129 mg/dL      Triglycerides 186 (H) mg/dL      HDL Cholesterol 22 (L) mg/dL      LDL Cholesterol  70 mg/dL      VLDL Cholesterol 37.2 mg/dL      LDL/HDL Ratio 3.17    Narrative:       Cholesterol Reference Ranges  (U.S. Department of Health and Human Services ATP III Classifications)    Desirable          <200 mg/dL  Borderline High    200-239 mg/dL  High Risk          >240 mg/dL      Triglyceride Reference Ranges  (U.S. Department of Health and Human Services ATP III Classifications)    Normal           <150 mg/dL  Borderline High  150-199 mg/dL  High             200-499 mg/dL  Very High        >500 mg/dL    HDL Reference Ranges  (U.S. Department of Health and Human Services ATP III Classifcations)    Low     <40 mg/dl (major risk factor for CHD)  High    >60 mg/dl ('negative' risk factor for CHD)        LDL Reference Ranges  (U.S. Department of Health and Human Services ATP III Classifcations)    Optimal          <100 mg/dL  Near Optimal     100-129 mg/dL  Borderline High  130-159 mg/dL  High             160-189 mg/dL  Very High        >189 mg/dL    POC Glucose Once [[id number]]  (Normal) Collected:  12/18/17 0720    Specimen:  Blood Updated:  12/18/17 0722     Glucose 111 mg/dL     Narrative:       Meter: [id number] : [id number]  Geni Rockwell    Stool Culture With Yersinia - Stool, Per Rectum [[id number]] Collected:  12/16/17 1707    Specimen:  Stool from Per Rectum Updated:  12/18/17 1021     Stool Cx w/ Yersinia --      Heavy growth (4+) Normal Fecal Sonali    Basic Metabolic Panel [[id number]]  (Abnormal) Collected:  12/18/17 0957    Specimen:  Blood Updated:  12/18/17 1058     Glucose 327 (H) mg/dL      BUN 3 (L) mg/dL      Creatinine 0.71 mg/dL      Sodium 135 (L) mmol/L      Potassium 4.5 mmol/L      Chloride 106 mmol/L      CO2 17.1 (L) mmol/L      Calcium 7.9 (L) mg/dL      eGFR Non African Amer 85 mL/min/1.73      BUN/Creatinine Ratio 4.2 (L)     Anion Gap 11.9 mmol/L     Narrative:       GFR Normal >60  Chronic Kidney Disease <60  Kidney Failure <15    POC Glucose Once [[id number]]  (Abnormal) Collected:  12/18/17 1114    Specimen:  Blood Updated:  12/18/17 1115     Glucose 329 (H) mg/dL     Narrative:       Meter: [id number] : [id number] Geni Rockwell        Imaging Results (last 72 hours)     Procedure Component Value Units Date/Time    CT Abdomen Pelvis Without Contrast [[id number]] Updated:  12/17/17 1616          Medication Review: done      Current Facility-Administered Medications:   •  ALPRAZolam (XANAX) tablet 0.5 mg, 0.5 mg, Oral, BID PRN, Abdirizak Vasquez MD, 0.5 mg at 12/19/17 2223  •  cholecalciferol (VITAMIN D3) tablet 1,000 Units, 1,000 Units, Oral, Daily, Juan Martinez MD, 1,000 Units at 12/20/17 0903  •  cycloSPORINE (RESTASIS) 0.05 % ophthalmic emulsion 1 drop, 1 drop, Both Eyes, Q12H, Juan Martinez MD, 1 drop at 12/20/17 0902  •  DULoxetine (CYMBALTA) DR capsule 90 mg, 90 mg, Oral, Daily, Juan Martinez MD, 90 mg at 12/20/17 0902  •  enoxaparin (LOVENOX) syringe 40 mg, 40 mg, Subcutaneous, Q24H, Juan Martinez MD, 40 mg at 12/19/17 2102  •  fluconazole (DIFLUCAN) IVPB 100 mg in 50mL NaCl, 100 mg, Intravenous, Daily, Juan Martinez MD, 100 mg at 12/19/17 1145  •  glucagon (human recombinant) (GLUCAGEN  DIAGNOSTIC) injection 1 mg, 1 mg, Subcutaneous, Q15 Min PRN, Juan Martinez MD  •  Influenza Vac Subunit Quad (FLUCELVAX) injection 0.5 mL, 0.5 mL, Intramuscular, During Hospitalization, Juan Martinez MD  •  insulin aspart (novoLOG) injection 0-10 Units, 0-10 Units, Subcutaneous, 4x Daily With Meals & Nightly, Merary Burnett MD  •  insulin aspart (novoLOG) injection 15 Units, 15 Units, Subcutaneous, QAM AC, Merary Burnett MD  •  insulin aspart (novoLOG) injection 20 Units, 20 Units, Subcutaneous, Daily Before Lunch, Merary Burnett MD  •  insulin aspart (novoLOG) injection 20 Units, 20 Units, Subcutaneous, Daily Before Supper, Merary Burnett MD  •  insulin detemir (LEVEMIR) injection 80 Units, 80 Units, Subcutaneous, Nightly, Endy Vasquez MD, 80 Units at 12/19/17 2102  •  levETIRAcetam (KEPPRA) tablet 500 mg, 500 mg, Oral, BID, Juan Martinez MD, 500 mg at 12/20/17 0902  •  morphine injection 1 mg, 1 mg, Intravenous, Q4H PRN **OR** morphine injection 2 mg, 2 mg, Intravenous, Q4H PRN, Abdirizak Vasquez MD, 2 mg at 12/18/17 2331  •  ondansetron (ZOFRAN) tablet 4 mg, 4 mg, Oral, Q6H PRN **OR** ondansetron ODT (ZOFRAN-ODT) disintegrating tablet 4 mg, 4 mg, Oral, Q6H PRN **OR** ondansetron (ZOFRAN) injection 4 mg, 4 mg, Intravenous, Q6H PRN, Juan Martinez MD, 4 mg at 12/17/17 1059  •  oxyCODONE (ROXICODONE) immediate release tablet 5 mg, 5 mg, Oral, Q8H PRN, Juan Martinez MD, 5 mg at 12/20/17 1149  •  pantoprazole (PROTONIX) EC tablet 40 mg, 40 mg, Oral, Q AM, Endy Vasquez MD, 40 mg at 12/20/17 0635  •  pantoprazole (PROTONIX) EC tablet 40 mg, 40 mg, Oral, Daily, Juan H. MD Michelle, 40 mg at 12/20/17 0902  •  potassium chloride (MICRO-K) CR capsule 40 mEq, 40 mEq, Oral, PRN **OR** potassium chloride (KLOR-CON) packet 40 mEq, 40 mEq, Oral, PRN **OR** potassium chloride 10 mEq in 100 mL IVPB, 10 mEq, Intravenous, Q1H PRN, Juan H. MD Michelle  •  pregabalin (LYRICA) capsule 75 mg, 75 mg, Oral, BID, Juan H.  MD Michelle, 75 mg at 12/20/17 0902  •  promethazine (PHENERGAN) injection 12.5 mg, 12.5 mg, Intravenous, Q4H PRN, Endy Vasquez MD, 12.5 mg at 12/18/17 2331  •  sodium chloride 0.9 % bolus 1,000 mL, 1,000 mL, Intravenous, Once, Juan Martinez MD  •  sodium chloride 0.9 % flush 1-10 mL, 1-10 mL, Intravenous, PRN, Juan Martinez MD  •  sodium chloride 0.9 % flush 10 mL, 10 mL, Intravenous, PRN, Darinel Montelongo MD    Assessment/Plan     Active Hospital Problems (** Indicates Principal Problem)    Diagnosis Date Noted   • Diabetic peripheral neuropathy [E11.42] 12/17/2017   • History of seizure disorder [Z86.69] 12/17/2017   • Diabetic ketoacidosis without coma associated with type 2 diabetes mellitus [E13.10] 12/16/2017   • Gastroparesis [K31.84] 10/17/2017   • Hyperlipidemia [E78.5]    • DUKES (nonalcoholic steatohepatitis) [K75.81] 06/14/2016   • Type 2 diabetes mellitus, uncontrolled, with neuropathy [E11.40, E11.65] 03/15/2016   • Cirrhosis of liver [K74.60] 03/08/2016      Resolved Hospital Problems    Diagnosis Date Noted Date Resolved   No resolved problems to display.     Type 2 diabetes mellitus-uncontrolled  Continue levemir 80 units at bedtime  Change NovoLog as follows  NovoLog 15 units with breakfast  NovoLog 20 units with lunch and dinner  Will change NovoLog sliding scale moderate dose 3 times a day before meals and at bedtime.    Discussed with the patient the difference between type 1 diabetes mellitus and type 2 diabetes mellitus.    Gastroparesis  GI on board.    Hyperlipidemia  LDL at range.    Discussed with the patient about the benefits of CGM and reassured her that we would consider this option as outpatient.    Discharge instructions from endocrine  Ok for discharge from endocrine perspective.  Patient will resume home insulin regimen upon discharge.  Patient will be seen by me in the clinic in a period of one to 2 weeks.    Merary Burnett MD.  12/20/17  2:04 PM      EMR Dragon / transcription  "disclaimer:    \"Dictated utilizing Dragon dictation\".   "

## 2017-12-21 NOTE — PROGRESS NOTES
Case Management Discharge Note    Final Note: Home with family, no additional dc orders noted. Sam RN/CCP    Discharge Placement     No information found             Discharge Codes: 01  Discharge to home

## 2017-12-22 ENCOUNTER — TELEPHONE (OUTPATIENT)
Dept: GASTROENTEROLOGY | Facility: CLINIC | Age: 57
End: 2017-12-22

## 2017-12-22 NOTE — TELEPHONE ENCOUNTER
Pt was d/c from hosp on 12/20/17 and 3 of her GI meds were discontinued:  Protonix, Phenergan, and Carafate.  She wants to know if it okay to continue taking all of these.    Discussed with Dr Coleman, he is unsure of why these meds would have been discontinued.  He is okay with pt restarting all 3 of these meds. Pt notified.

## 2017-12-26 ENCOUNTER — TELEPHONE (OUTPATIENT)
Dept: INTERNAL MEDICINE | Facility: CLINIC | Age: 57
End: 2017-12-26

## 2017-12-26 DIAGNOSIS — Z78.0 MENOPAUSE: Primary | ICD-10-CM

## 2017-12-26 NOTE — TELEPHONE ENCOUNTER
----- Message from Keily Macias sent at 12/26/2017 10:27 AM EST -----  Contact: pt  Patient called on 11/29/17 and would like a referral for a bone density test with [facility]. Could you please have Dr Pitts put a referral in for this patient. thanks

## 2017-12-27 LAB
GLUCOSE BLDC GLUCOMTR-MCNC: 235 MG/DL (ref 70–130)
GLUCOSE BLDC GLUCOMTR-MCNC: 314 MG/DL (ref 70–130)

## 2017-12-28 ENCOUNTER — OFFICE VISIT (OUTPATIENT)
Dept: INTERNAL MEDICINE | Facility: CLINIC | Age: 57
End: 2017-12-28

## 2017-12-28 VITALS
BODY MASS INDEX: 28.09 KG/M2 | SYSTOLIC BLOOD PRESSURE: 132 MMHG | WEIGHT: 179 LBS | DIASTOLIC BLOOD PRESSURE: 76 MMHG | HEIGHT: 67 IN

## 2017-12-28 DIAGNOSIS — F32.A ANXIETY AND DEPRESSION: Primary | ICD-10-CM

## 2017-12-28 DIAGNOSIS — F41.9 ANXIETY AND DEPRESSION: Primary | ICD-10-CM

## 2017-12-28 PROCEDURE — 99213 OFFICE O/P EST LOW 20 MIN: CPT | Performed by: INTERNAL MEDICINE

## 2017-12-28 RX ORDER — METHYLNALTREXONE BROMIDE 150 MG/1
450 TABLET ORAL DAILY
COMMUNITY
Start: 2017-12-15 | End: 2018-10-11 | Stop reason: HOSPADM

## 2017-12-28 RX ORDER — PROMETHAZINE HYDROCHLORIDE 25 MG/1
25 TABLET ORAL EVERY 6 HOURS PRN
COMMUNITY
End: 2018-02-07 | Stop reason: SDUPTHER

## 2017-12-28 RX ORDER — PANTOPRAZOLE SODIUM 40 MG/1
40 TABLET, DELAYED RELEASE ORAL DAILY
COMMUNITY
End: 2018-04-12 | Stop reason: SDUPTHER

## 2017-12-28 RX ORDER — BUPROPION HYDROCHLORIDE 150 MG/1
150 TABLET ORAL DAILY
Qty: 90 TABLET | Refills: 1
Start: 2017-12-28 | End: 2018-05-08 | Stop reason: SDUPTHER

## 2017-12-28 RX ORDER — SUCRALFATE 1 G/1
1 TABLET ORAL 2 TIMES DAILY
COMMUNITY
End: 2018-05-14 | Stop reason: SDUPTHER

## 2017-12-28 NOTE — PROGRESS NOTES
"Chief Complaint   Patient presents with   • Follow-up     hospital follow up        Subjective   Silvia Zabala is a 57 y.o. female     HPI:  She was recently hospitalized for nausea, vomiting, DKA.  Evaluation has shown inflammation around duodenum and pancreas. On discharge, she was advised to stop taking bupropion.  That had been started because duloxetine was not helping completely with her feelings of depression.  She had just been starting to feel better with the bupropion and wonders if she can resume it.      The following portions of the patient's history were reviewed and updated as appropriate: allergies, current medications, past social history, problem list, past surgical history    Review of Systems   Constitutional: Negative for fever.   Cardiovascular: Positive for palpitations.   Gastrointestinal: Positive for nausea and vomiting. Negative for diarrhea.           Objective     /76  Ht 168.9 cm (66.5\")  Wt 81.2 kg (179 lb)  BMI 28.46 kg/m2     Physical Exam   Constitutional: She appears well-developed and well-nourished. No distress.   Eyes: No scleral icterus.   Neck: Normal carotid pulses present. Carotid bruit is not present.   Cardiovascular: Regular rhythm, S1 normal and S2 normal.  Tachycardia present.  Exam reveals no gallop and no friction rub.    No murmur heard.  Pulmonary/Chest: Effort normal and breath sounds normal. She has no wheezes. She has no rhonchi. She has no rales.   Musculoskeletal: She exhibits no edema.   Lymphadenopathy:     She has no cervical adenopathy.   Nursing note and vitals reviewed.      Assessment/Plan   Problem List Items Addressed This Visit        Other    Anxiety and depression - Primary    Relevant Medications    buPROPion XL (WELLBUTRIN XL) 150 MG 24 hr tablet      Will resume bupropion.  She will continue to see her endocrinologist for diabetes, gastroenterologist for history of liver cirrhosis, diabetic gastroparesis.  "

## 2018-01-03 ENCOUNTER — DOCUMENTATION (OUTPATIENT)
Dept: ONCOLOGY | Facility: HOSPITAL | Age: 58
End: 2018-01-03

## 2018-01-03 NOTE — PROGRESS NOTES
Message sent to Dr. Mcdowell regarding ferrous sulfate and if patient needing to resume after hospitalization.  Patient called and informed message left for Dr. Mcdowell and that someone would contact her early next week.  Pt v/u

## 2018-01-10 RX ORDER — FOLIC ACID 1 MG/1
TABLET ORAL
Qty: 30 TABLET | Refills: 1 | Status: SHIPPED | OUTPATIENT
Start: 2018-01-10 | End: 2018-03-11 | Stop reason: SDUPTHER

## 2018-01-11 ENCOUNTER — DOCUMENTATION (OUTPATIENT)
Dept: ONCOLOGY | Facility: HOSPITAL | Age: 58
End: 2018-01-11

## 2018-01-11 NOTE — PROGRESS NOTES
Message left for patient to resume taking her iron supplement as directed per Dr. Mcdowell.  Message left to call office if any questions.

## 2018-01-18 ENCOUNTER — OFFICE VISIT (OUTPATIENT)
Dept: ENDOCRINOLOGY | Age: 58
End: 2018-01-18

## 2018-01-18 VITALS
WEIGHT: 172 LBS | BODY MASS INDEX: 27 KG/M2 | HEIGHT: 67 IN | HEART RATE: 114 BPM | SYSTOLIC BLOOD PRESSURE: 134 MMHG | OXYGEN SATURATION: 97 % | DIASTOLIC BLOOD PRESSURE: 80 MMHG

## 2018-01-18 DIAGNOSIS — K31.84 GASTROPARESIS: ICD-10-CM

## 2018-01-18 DIAGNOSIS — E11.42 DIABETIC PERIPHERAL NEUROPATHY (HCC): ICD-10-CM

## 2018-01-18 DIAGNOSIS — Z23 NEED FOR INFLUENZA VACCINATION: Primary | ICD-10-CM

## 2018-01-18 DIAGNOSIS — IMO0002 TYPE 2 DIABETES MELLITUS, UNCONTROLLED, WITH NEUROPATHY: Primary | ICD-10-CM

## 2018-01-18 PROCEDURE — 90686 IIV4 VACC NO PRSV 0.5 ML IM: CPT | Performed by: INTERNAL MEDICINE

## 2018-01-18 PROCEDURE — 90471 IMMUNIZATION ADMIN: CPT | Performed by: INTERNAL MEDICINE

## 2018-01-18 PROCEDURE — 99214 OFFICE O/P EST MOD 30 MIN: CPT | Performed by: INTERNAL MEDICINE

## 2018-01-18 RX ORDER — FERROUS SULFATE TAB EC 324 MG (65 MG FE EQUIVALENT) 324 (65 FE) MG
324 TABLET DELAYED RESPONSE ORAL
COMMUNITY
End: 2018-05-08 | Stop reason: SDUPTHER

## 2018-01-18 NOTE — PROGRESS NOTES
57 y.o.    Patient Care Team:  Blanca Pitts MD as PCP - General (Internal Medicine)  Sukhdeep Mcdowell MD as Consulting Physician (Hematology and Oncology)  Blanca Pitts MD as Referring Physician (Internal Medicine)    Chief Complaint:    Follow Up Appointment/Type 2 Diabetes Mellitus  Subjective     HPI      Silvia Zabala,56 y.o. WF is here as a follow up for the management of Type 2 dm. Referred by .   Pt was recently hospitalized in Nov 017 with high BG, DKA and gastroparesis flare up.       Type 2 dm - Pt was diagnosed with diabetes in her early 20's, was started on oral agents initially. Insulin was started 10 year ago. Currently she is on Lantus 80 units at bed time, novolog 25 units with snack and 40 units with meals along with novolog moderate dose ssi tid ac ( 2 units for 50 above 150 mg/dl ).   Patient got her log book for me to review.   BG have been extremely variable. Checks 3 - 4 times a day.  FBG - 150 - 180 and random BG - 200 - 300 mg/dl.   1 BG around 62 mg/dl in the last few weeks. Does have hypoglycemic awareness. Highest blood sugar in the last 1 month was 411 mg/dL  Last eye exam was 1 month ago, no diabetic retinopathy per patient.  C/o tingling and numbness in her feet, no ulcers or amputation in her feet. On lyrica 75 mg po bid, which per pt is helping her.   No CAD, CVA or CKD per pt.   Did have diabetic education and eats multiple meals a day due to her liver disease. She does try to follow diabetic diet.   She doesn't exercise due to arthritis.   Negative urine microalbuminuria.       Pancreatic divisum : No hx of pancreatitis per pt.       Liver disease/DUKES - She is required to eat 80 gm of protein per day and she drinks two Glucerna shakes at bed time. Sees Dr. Jordan from [facility].       Gastroparesis - Sees Dr. Coleman. On reglan 5 mg po bid and she is also getting another medication from [facility] for her gastroparesis.       Hyperlipidemia - on pravastatin 40 mg po daily.         Interval History      The following portions of the patient's history were reviewed and updated as appropriate: allergies, current medications, past family history, past medical history, past social history, past surgical history and problem list.    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   • Cirrhosis    • Colon polyps    • Deafness    • Depression    • Diabetes mellitus     Adult onset, insulin requiring   • Duodenal ulcer with hemorrhage    • Esophageal varices determined by endoscopy    • Fibromyalgia    • Gallbladder disease    • Gastroparesis    • Glaucoma    • Granuloma annulare    • H/O B12 deficiency    • H/O Bleeding ulcer     And gastroesophageal varices   • H/O mixed connective tissue disorder    • Hemorrhoids    • Hiatal hernia    • History of transfusion    • Hx of being hospitalized     In [place] for GI bleeding from ulcer and varices   • Hyperlipidemia    • Migraine    • BRICE (obstructive sleep apnea)    • Pancreas divisum    • Pancytopenia     Chronic, due to cirrhosis and hypersplenism   • Peptic ulcer disease     And esophageal varices   • PONV (postoperative nausea and vomiting)    • RA (rheumatoid arthritis)    • Seizure disorder     LAST ONE SEVERAL YEARS AGO   • Seizures    • Systemic lupus      Family History   Problem Relation Age of Onset   • Liver disease Other    • Depression Other    • Heart disease Other    • Hypertension Other    • Diabetes Other    • Breast cancer Other    • Brain cancer Other    • Uterine cancer Other    • Colon cancer Other    • Leukemia Other    • Colon cancer Maternal Aunt    • Hypertension Mother    • Diabetes type II Mother    • Rheum arthritis Mother    • Liver disease Mother      DUKES   • Heart disease Father    • Diabetes type II Father    • Diabetes type II Sister   "  • Lupus Sister    • Malig Hyperthermia Neg Hx      Social History     Social History   • Marital status:      Spouse name: Jose   • Number of children: N/A   • Years of education: N/A     Occupational History   •  Disabled     Social History Main Topics   • Smoking status: Former Smoker     Packs/day: 0.00     Years: 0.00     Quit date: 12/17/2015   • Smokeless tobacco: Never Used   • Alcohol use No   • Drug use: No   • Sexual activity: Defer     Other Topics Concern   • Not on file     Social History Narrative    Moved to [place] from [place]. She is currently medically disabled.     Allergies   Allergen Reactions   • Albuterol Anaphylaxis   • Tramadol Other (See Comments)     Per pt causes her \"palsy, meaning shaking and tremors\" and gi upset, nausea   • Quinine Derivatives Nausea And Vomiting       Current Outpatient Prescriptions:   •  ALPRAZolam (XANAX) 0.5 MG tablet, Take 1 tablet by mouth 2 (Two) Times a Day As Needed for Anxiety., Disp: 60 tablet, Rfl: 0  •  buPROPion XL (WELLBUTRIN XL) 150 MG 24 hr tablet, Take 1 tablet by mouth Daily., Disp: 90 tablet, Rfl: 1  •  cholecalciferol (VITAMIN D3) 1000 UNITS tablet, Take 1,000 Units by mouth Daily., Disp: , Rfl:   •  cycloSPORINE (RESTASIS) 0.05 % ophthalmic emulsion, 1 drop 2 (two) times a day., Disp: , Rfl:   •  DULoxetine (CYMBALTA) 30 MG capsule, Take 90 mg by mouth Daily., Disp: , Rfl:   •  ferrous sulfate 324 (65 Fe) MG tablet delayed-release EC tablet, Take 324 mg by mouth Daily With Breakfast., Disp: , Rfl:   •  folic acid (FOLVITE) 1 MG tablet, Take 1 mg by mouth Daily., Disp: , Rfl:   •  folic acid (FOLVITE) 1 MG tablet, TAKE ONE TABLET BY MOUTH DAILY, Disp: 30 tablet, Rfl: 1  •  glucose blood (ONE TOUCH ULTRA TEST) test strip, Test blood sugar 4 times daily, Disp: 200 each, Rfl: 5  •  insulin aspart (novoLOG) 100 UNIT/ML injection, 15 U w brkfst, 20 U w lunch, 20 U w dinner (Patient taking differently: 25 units per snack 40 units a meal " and adjust how sugars are), Disp: 1 each, Rfl: 12  •  insulin glargine (LANTUS) 100 UNIT/ML injection, Inject 80 Units under the skin Every Night., Disp: , Rfl:   •  Insulin Pen Needle 31G X 5 MM misc, To inject 5 -6 times per day, Disp: 100 each, Rfl: 11  •  levETIRAcetam (KEPPRA) 500 MG tablet, Take 500 mg by mouth 2 (Two) Times a Day., Disp: , Rfl:   •  metFORMIN (GLUCOPHAGE) 1000 MG tablet, TAKE ONE TABLET BY MOUTH TWICE A DAY WITH MEALS, Disp: 60 tablet, Rfl: 1  •  Multiple Vitamin (MULTI-VITAMIN PO), Take 1 tablet by mouth Daily., Disp: , Rfl:   •  OxyCODONE HCl (OXAYDO) 7.5 MG tablet , Take 7.5 mg by mouth Every 8 (Eight) Hours As Needed., Disp: , Rfl:   •  pantoprazole (PROTONIX) 40 MG EC tablet, Take 40 mg by mouth Daily., Disp: , Rfl:   •  pregabalin (LYRICA) 75 MG capsule, Take 1 capsule by mouth 2 (Two) Times a Day., Disp: 60 capsule, Rfl: 2  •  promethazine (PHENERGAN) 25 MG tablet, Take 25 mg by mouth Every 6 (Six) Hours As Needed., Disp: , Rfl:   •  RELISTOR 150 MG tablet, 150 mg Daily., Disp: , Rfl:   •  rifaximin (XIFAXAN) 550 MG tablet, Take 550 mg by mouth Every 12 (Twelve) Hours., Disp: , Rfl:   •  spironolactone (ALDACTONE) 100 MG tablet, Take 1 tablet by mouth Daily., Disp: 30 tablet, Rfl: 0  •  sucralfate (CARAFATE) 1 g tablet, Take 1 g by mouth 2 (Two) Times a Day., Disp: , Rfl:   •  vitamin B-12 (CYANOCOBALAMIN) 1000 MCG tablet, Take 1,000 mcg by mouth daily., Disp: , Rfl:         Review of Systems   Constitutional: Positive for fatigue. Negative for fever.   HENT: Positive for trouble swallowing. Negative for facial swelling, nosebleeds and voice change.    Eyes: Negative for pain and redness.   Respiratory: Negative for shortness of breath and wheezing.    Cardiovascular: Positive for palpitations. Negative for leg swelling.   Gastrointestinal: Positive for abdominal pain, diarrhea and vomiting.   Endocrine: Negative for polydipsia and polyuria.   Genitourinary: Negative for decreased  "urine volume and frequency.   Musculoskeletal: Positive for joint swelling and neck pain.   Skin: Negative for rash.   Allergic/Immunologic: Negative for immunocompromised state.   Neurological: Positive for numbness and headaches. Negative for seizures and facial asymmetry.   Hematological: Does not bruise/bleed easily.   Psychiatric/Behavioral: Negative for agitation and confusion.       Objective       Vitals:    01/18/18 1147   BP: 134/80   Pulse: 114   SpO2: 97%   Weight: 78 kg (172 lb)   Height: 168.9 cm (66.5\")     Body mass index is 27.35 kg/(m^2).      Physical Exam   Gen exam - alert and oriented x 3, obese female, not in distress.   HEENT - Acanthosis nigricans. Thyroid palpable.   Resp - Clear to auscultation.   CVS - S1,S2 heard and no murmurs.   Abd - Non tender, BS heard.   Ext - No edema.    Results Review:     I reviewed the patient's new clinical results.    Medical records reviewed  Summary: done      Admission on 12/16/2017, Discharged on 12/20/2017   No results displayed because visit has over 200 results.        Lab Results   Component Value Date    HGBA1C 11.20 (H) 12/16/2017    HGBA1C 6.86 (H) 04/25/2017    HGBA1C 5.80 (H) 03/07/2017     Lab Results   Component Value Date    MICROALBUR <1.2 12/17/2017    LDLCALC 70 12/18/2017    CREATININE 0.66 12/19/2017     Imaging Results (most recent)     None                Assessment and Plan:    Silvia was seen today for diabetes.    Diagnoses and all orders for this visit:    Type 2 diabetes mellitus, uncontrolled, with neuropathy  -     Hemoglobin A1c; Future  -     Basic Metabolic Panel; Future  -     Lipid Panel; Future  -     Microalbumin / Creatinine Urine Ratio - Urine, Clean Catch; Future  -     TSH; Future  -     Vitamin D 25 Hydroxy; Future  -     Vitamin B12 & Folate; Future  -     C-Peptide; Future  -     Anti-islet Cell Antibody; Future  -     Insulin Antibody; Future    Diabetic peripheral neuropathy  -     Hemoglobin A1c; Future  -     " Basic Metabolic Panel; Future  -     Lipid Panel; Future  -     Microalbumin / Creatinine Urine Ratio - Urine, Clean Catch; Future  -     TSH; Future  -     Vitamin D 25 Hydroxy; Future  -     Vitamin B12 & Folate; Future  -     C-Peptide; Future  -     Anti-islet Cell Antibody; Future  -     Insulin Antibody; Future    Gastroparesis  -     Hemoglobin A1c; Future  -     Basic Metabolic Panel; Future  -     Lipid Panel; Future  -     Microalbumin / Creatinine Urine Ratio - Urine, Clean Catch; Future  -     TSH; Future  -     Vitamin D 25 Hydroxy; Future  -     Vitamin B12 & Folate; Future  -     C-Peptide; Future  -     Anti-islet Cell Antibody; Future  -     Insulin Antibody; Future      Type 2 dm - Uncontrolled.   Will increase levemir to 40 units in the morning   Will add levemir 80 units at bed time.  Continue novolog 25 units with snack.   Increase novolog to 45 units tid ac  Will stop metformin.   Placed CGM on the pt to check her BG trends on the current insulin regimen changes.   Advised pt to get dexcom which could alert with high and low BG so that way she could prevent gastropareis flare up.   Pt filled paper work for [employer].     HLP   Continue pravastatin.     Since pt was admitted with DKA will check C- peptide levels, MATTHEW 65 and islet and insulin ab levels.     Pt got flu vaccination today in the clinic.        Reviewed Lab results with the patient.     14 minutes out of 25 minutes face to face spent in counseling the patient extensively on insulin regimen changes.

## 2018-02-05 DIAGNOSIS — F41.9 ANXIETY: ICD-10-CM

## 2018-02-07 RX ORDER — LEVETIRACETAM 500 MG/1
TABLET ORAL
Qty: 60 TABLET | Refills: 0 | Status: SHIPPED | OUTPATIENT
Start: 2018-02-07 | End: 2018-04-07 | Stop reason: SDUPTHER

## 2018-02-07 RX ORDER — PROMETHAZINE HYDROCHLORIDE 25 MG/1
TABLET ORAL
Qty: 30 TABLET | Refills: 0 | Status: SHIPPED | OUTPATIENT
Start: 2018-02-07 | End: 2018-03-29 | Stop reason: SDUPTHER

## 2018-02-07 RX ORDER — ALPRAZOLAM 0.5 MG/1
TABLET ORAL
Qty: 60 TABLET | Refills: 0 | OUTPATIENT
Start: 2018-02-07 | End: 2018-03-23 | Stop reason: SDUPTHER

## 2018-02-15 ENCOUNTER — TELEPHONE (OUTPATIENT)
Dept: ENDOCRINOLOGY | Age: 58
End: 2018-02-15

## 2018-02-15 NOTE — TELEPHONE ENCOUNTER
SPOKE WITH PATIENT ABOUT THE CGM SHE WORE FOR 2 WEEKS TO CHECK HER BLOOD SUGAR LEVELS. LET PATIENT KNOW THAT THE SENSOR WAS BAD AND WOULD IT BE POSSIABLE TO COME IN AND HAVE ANOTHER SENSOR PLACED ON. PATIENT STATED SHE WOULD COME IN ON Friday 2/16/18 TO HAVE A NEW CGM PLACED

## 2018-02-22 ENCOUNTER — TREATMENT (OUTPATIENT)
Dept: ENDOCRINOLOGY | Age: 58
End: 2018-02-22

## 2018-02-22 ENCOUNTER — OFFICE VISIT (OUTPATIENT)
Dept: INTERNAL MEDICINE | Facility: CLINIC | Age: 58
End: 2018-02-22

## 2018-02-22 ENCOUNTER — TELEPHONE (OUTPATIENT)
Dept: ENDOCRINOLOGY | Age: 58
End: 2018-02-22

## 2018-02-22 VITALS
HEIGHT: 67 IN | DIASTOLIC BLOOD PRESSURE: 70 MMHG | BODY MASS INDEX: 28.56 KG/M2 | SYSTOLIC BLOOD PRESSURE: 130 MMHG | WEIGHT: 182 LBS

## 2018-02-22 DIAGNOSIS — E78.5 HYPERLIPIDEMIA, UNSPECIFIED HYPERLIPIDEMIA TYPE: ICD-10-CM

## 2018-02-22 DIAGNOSIS — Z78.0 MENOPAUSE: ICD-10-CM

## 2018-02-22 DIAGNOSIS — R21 RASH: Primary | ICD-10-CM

## 2018-02-22 DIAGNOSIS — R42 DIZZINESS: ICD-10-CM

## 2018-02-22 LAB
DEPRECATED RDW RBC AUTO: 48.4 FL (ref 37–54)
ERYTHROCYTE [DISTWIDTH] IN BLOOD BY AUTOMATED COUNT: 15.3 % (ref 11.5–15)
HCT VFR BLD AUTO: 32.6 % (ref 34.1–44.9)
HGB BLD-MCNC: 10.4 G/DL (ref 11.2–15.7)
MCH RBC QN AUTO: 28.1 PG (ref 26–34)
MCHC RBC AUTO-ENTMCNC: 31.9 G/DL (ref 31–37)
MCV RBC AUTO: 88.1 FL (ref 80–100)
PLATELET # BLD AUTO: 85 10*3/MM3 (ref 150–450)
PMV BLD AUTO: 11.8 FL (ref 6–12)
RBC # BLD AUTO: 3.7 10*6/MM3 (ref 3.93–5.22)
WBC NRBC COR # BLD: 3.49 10*3/MM3 (ref 5–10)

## 2018-02-22 PROCEDURE — 36415 COLL VENOUS BLD VENIPUNCTURE: CPT | Performed by: INTERNAL MEDICINE

## 2018-02-22 PROCEDURE — 99213 OFFICE O/P EST LOW 20 MIN: CPT | Performed by: INTERNAL MEDICINE

## 2018-02-22 PROCEDURE — 85027 COMPLETE CBC AUTOMATED: CPT | Performed by: INTERNAL MEDICINE

## 2018-02-22 NOTE — PROGRESS NOTES
Chief Complaint   Patient presents with   • Hyperlipidemia     4 month follow up   • Rash     rash on legs        Subjective   Silvia Zabala is a 57 y.o. female     Hyperlipidemia   This is a chronic problem. The problem is controlled. Recent lipid tests were reviewed and are variable. Exacerbating diseases include diabetes and obesity. There are no known factors aggravating her hyperlipidemia. Pertinent negatives include no leg pain or myalgias. Current antihyperlipidemic treatment includes diet change. The current treatment provides moderate improvement of lipids. Compliance problems include adherence to diet.  Risk factors for coronary artery disease include diabetes mellitus.   Rash   This is a new problem. The current episode started more than 1 month ago. The problem is unchanged. The affected locations include the right lowerleg and left lower leg. The rash is characterized by itchiness and redness. She was exposed to nothing. Pertinent negatives include no congestion, diarrhea, fatigue or fever. Past treatments include anti-itch cream and topical steroids. The treatment provided mild relief. (Lupus, diabetes, cirrhosis)   Dizziness   This is a new problem. The current episode started 1 to 4 weeks ago. The problem occurs rarely. Associated symptoms include a rash. Pertinent negatives include no congestion, fatigue, fever or myalgias. The symptoms are aggravated by standing and walking. She has tried nothing for the symptoms.   She has had 2 episodes of dizziness.  These occurred at home.  Both times, she had to brace herself.  She did not have to sit down.  She was able to keep doing what she was doing.  No palpitations.  No chest pain.  No shortness of breath.  No nausea or vomiting.  She does not think her blood sugar was low.    Her most recent lipid panel was done 12/18/2017.  Total cholesterol 129, triglycerides 186, HDL 22 and LDL 70.    The following portions of the patient's history were reviewed  "and updated as appropriate: allergies, current medications, past social history, problem list, past surgical history    Review of Systems   Constitutional: Negative for fatigue and fever.   HENT: Negative for congestion.    Gastrointestinal: Negative for diarrhea.   Musculoskeletal: Negative for myalgias.   Skin: Positive for rash.   Neurological: Positive for dizziness.           Objective     /70  Ht 168.9 cm (66.5\")  Wt 82.6 kg (182 lb)  BMI 28.94 kg/m2     Physical Exam   Constitutional: She is oriented to person, place, and time. She appears well-developed and well-nourished. No distress.   Neck: Normal carotid pulses present. Carotid bruit is not present.   Cardiovascular: Normal rate, regular rhythm, S1 normal, S2 normal and intact distal pulses.  Exam reveals no gallop and no friction rub.    No murmur heard.  Pulses:       Carotid pulses are 2+ on the right side, and 2+ on the left side.  Pulmonary/Chest: Effort normal and breath sounds normal. No respiratory distress. She has no wheezes. She has no rhonchi. She has no rales. Chest wall is not dull to percussion.   Musculoskeletal: She exhibits no edema.   Neurological: She is alert and oriented to person, place, and time.   Skin: Skin is warm and dry.   Scattered red dots on both lower legs.  No fluctuance.  No tenderness.   Nursing note and vitals reviewed.      Assessment/Plan   Problem List Items Addressed This Visit        Cardiovascular and Mediastinum    Hyperlipidemia      Other Visit Diagnoses     Rash    -  Primary    Relevant Orders    Ambulatory Referral to Dermatology (Completed)    Dizziness        Relevant Orders    CBC (No Diff)        Discussed dizziness.  She is going to monitor her symptoms.  Advised her I was not sure what was causing her rash.  She is referred to a dermatologist.  Reviewed recent lipid panel.  She will have her lipids checked again through her endocrinologist this spring.    "

## 2018-02-22 NOTE — PROGRESS NOTES
Patient came into the office today for a replacement of a CGM [employer] Roseline Pro CGM was place on back of patient left arm covered with tegaderm this was done on 2/22/2018 and 9:30 am. Verbal instruction was given for care CGM and Patient was instructed to bring the CGM back on 3/8/2018 patient voice understanding.

## 2018-03-01 ENCOUNTER — RESULTS ENCOUNTER (OUTPATIENT)
Dept: ENDOCRINOLOGY | Age: 58
End: 2018-03-01

## 2018-03-01 DIAGNOSIS — IMO0002 TYPE 2 DIABETES MELLITUS, UNCONTROLLED, WITH NEUROPATHY: ICD-10-CM

## 2018-03-01 DIAGNOSIS — E11.42 DIABETIC PERIPHERAL NEUROPATHY (HCC): ICD-10-CM

## 2018-03-01 DIAGNOSIS — K31.84 GASTROPARESIS: ICD-10-CM

## 2018-03-12 ENCOUNTER — TREATMENT (OUTPATIENT)
Dept: ENDOCRINOLOGY | Age: 58
End: 2018-03-12

## 2018-03-12 DIAGNOSIS — IMO0002 TYPE 2 DIABETES MELLITUS, UNCONTROLLED, WITH NEUROPATHY: ICD-10-CM

## 2018-03-12 DIAGNOSIS — K31.84 GASTROPARESIS: ICD-10-CM

## 2018-03-12 PROCEDURE — 95251 CONT GLUC MNTR ANALYSIS I&R: CPT | Performed by: INTERNAL MEDICINE

## 2018-03-12 RX ORDER — FOLIC ACID 1 MG/1
TABLET ORAL
Qty: 30 TABLET | Refills: 2 | Status: SHIPPED | OUTPATIENT
Start: 2018-03-12 | End: 2018-03-15

## 2018-03-12 NOTE — PROGRESS NOTES
Continues glucose monitoring data    Patient wore continuous glucose monitoring-free style jesusita Pro from Feb 22 - March 8 th  Average blood glucose reading was 216  Mg/dl.   Estimated HbA1c 9.2%   Likelihood of low blood glucose levels was mild at early mornings  Likelihood of high blood glucose levels was high - most of the day  Blood glucose trends showed high BG after meals.     Current treatment regimen  Levemir 40 units in the morning  Levemir 80 units at bedtime  NovoLog 25 units with snacks  NovoLog 45 units with each meal.    Changes to the treatment regimen  Continue levemir 60 units in the morning  Continue levemir 75 units at bedtime  NovoLog 30 units with snacks  NovoLog 55 units with each meal.   NovoLog high dose sliding scale 3 times a day before meals and at bedtime  BG less than 150 - zero  151 - 200 - 3 unit  201 - 250 - 6 units  251 - 300 - 9 units  301 - 350 - 12 units  Above 350 mg/dl - 15 units.     Please call and let the pt know about these changes.     Will discuss about U 500 insulin during next visit.

## 2018-03-13 ENCOUNTER — APPOINTMENT (OUTPATIENT)
Dept: WOMENS IMAGING | Facility: HOSPITAL | Age: 58
End: 2018-03-13

## 2018-03-13 PROCEDURE — 77067 SCR MAMMO BI INCL CAD: CPT | Performed by: RADIOLOGY

## 2018-03-13 PROCEDURE — 77063 BREAST TOMOSYNTHESIS BI: CPT | Performed by: RADIOLOGY

## 2018-03-13 PROCEDURE — MDREVIEWSP: Performed by: RADIOLOGY

## 2018-03-13 PROCEDURE — 77080 DXA BONE DENSITY AXIAL: CPT | Performed by: RADIOLOGY

## 2018-03-14 ENCOUNTER — TELEPHONE (OUTPATIENT)
Dept: ENDOCRINOLOGY | Age: 58
End: 2018-03-14

## 2018-03-14 NOTE — TELEPHONE ENCOUNTER
CALLED AND SPOKE WITH PATIENT ABOUT CGM RESULTS AND INSULIN REGIME CHANGE PRE DR WALKER     PATIENT STATED SHE WOULD RATHER  A COPY OF THE CHANGES TOMMORROW    Patient wore continuous glucose monitoring-free style jesusita Pro from Feb 22 - March 8 th  Average blood glucose reading was 216  Mg/dl.   Estimated HbA1c 9.2%   Likelihood of low blood glucose levels was mild at early mornings  Likelihood of high blood glucose levels was high - most of the day  Blood glucose trends showed high BG after meals.      Current treatment regimen  Levemir 40 units in the morning  Levemir 80 units at bedtime  NovoLog 25 units with snacks  NovoLog 45 units with each meal.     Changes to the treatment regimen  Continue levemir 60 units in the morning  Continue levemir 75 units at bedtime  NovoLog 30 units with snacks  NovoLog 55 units with each meal.   NovoLog high dose sliding scale 3 times a day before meals and at bedtime  BG less than 150 - zero  151 - 200 - 3 unit  201 - 250 - 6 units  251 - 300 - 9 units  301 - 350 - 12 units  Above 350 mg/dl - 15 units.

## 2018-03-15 ENCOUNTER — APPOINTMENT (OUTPATIENT)
Dept: CT IMAGING | Facility: HOSPITAL | Age: 58
End: 2018-03-15

## 2018-03-15 ENCOUNTER — HOSPITAL ENCOUNTER (EMERGENCY)
Facility: HOSPITAL | Age: 58
Discharge: HOME | End: 2018-03-15
Attending: EMERGENCY MEDICINE | Admitting: EMERGENCY MEDICINE

## 2018-03-15 VITALS
WEIGHT: 176 LBS | HEART RATE: 98 BPM | BODY MASS INDEX: 27.62 KG/M2 | HEIGHT: 67 IN | SYSTOLIC BLOOD PRESSURE: 132 MMHG | OXYGEN SATURATION: 95 % | TEMPERATURE: 96.1 F | RESPIRATION RATE: 16 BRPM | DIASTOLIC BLOOD PRESSURE: 82 MMHG

## 2018-03-15 DIAGNOSIS — R10.9 RIGHT SIDED ABDOMINAL PAIN: ICD-10-CM

## 2018-03-15 DIAGNOSIS — N39.0 ACUTE UTI: Primary | ICD-10-CM

## 2018-03-15 LAB
ALBUMIN SERPL-MCNC: 3.2 G/DL (ref 3.5–5.2)
ALBUMIN/GLOB SERPL: 0.7 G/DL
ALP SERPL-CCNC: 137 U/L (ref 39–117)
ALT SERPL W P-5'-P-CCNC: 24 U/L (ref 1–33)
ANION GAP SERPL CALCULATED.3IONS-SCNC: 13.1 MMOL/L
AST SERPL-CCNC: 31 U/L (ref 1–32)
BACTERIA UR QL AUTO: ABNORMAL /HPF
BASOPHILS # BLD AUTO: 0.03 10*3/MM3 (ref 0–0.2)
BASOPHILS NFR BLD AUTO: 0.4 % (ref 0–1.5)
BILIRUB SERPL-MCNC: 1.2 MG/DL (ref 0.1–1.2)
BILIRUB UR QL STRIP: NEGATIVE
BUN BLD-MCNC: 15 MG/DL (ref 6–20)
BUN/CREAT SERPL: 15 (ref 7–25)
CALCIUM SPEC-SCNC: 8.9 MG/DL (ref 8.6–10.5)
CHLORIDE SERPL-SCNC: 94 MMOL/L (ref 98–107)
CLARITY UR: ABNORMAL
CO2 SERPL-SCNC: 23.9 MMOL/L (ref 22–29)
COLOR UR: ABNORMAL
CREAT BLD-MCNC: 1 MG/DL (ref 0.57–1)
DEPRECATED RDW RBC AUTO: 45.9 FL (ref 37–54)
EOSINOPHIL # BLD AUTO: 0.28 10*3/MM3 (ref 0–0.7)
EOSINOPHIL NFR BLD AUTO: 4.1 % (ref 0.3–6.2)
ERYTHROCYTE [DISTWIDTH] IN BLOOD BY AUTOMATED COUNT: 15 % (ref 11.7–13)
GFR SERPL CREATININE-BSD FRML MDRD: 58 ML/MIN/1.73
GLOBULIN UR ELPH-MCNC: 4.6 GM/DL
GLUCOSE BLD-MCNC: 349 MG/DL (ref 65–99)
GLUCOSE UR STRIP-MCNC: ABNORMAL MG/DL
HCT VFR BLD AUTO: 37.7 % (ref 35.6–45.5)
HGB BLD-MCNC: 12.3 G/DL (ref 11.9–15.5)
HGB UR QL STRIP.AUTO: ABNORMAL
HYALINE CASTS UR QL AUTO: ABNORMAL /LPF
IMM GRANULOCYTES # BLD: 0.03 10*3/MM3 (ref 0–0.03)
IMM GRANULOCYTES NFR BLD: 0.4 % (ref 0–0.5)
KETONES UR QL STRIP: NEGATIVE
LEUKOCYTE ESTERASE UR QL STRIP.AUTO: ABNORMAL
LYMPHOCYTES # BLD AUTO: 1.26 10*3/MM3 (ref 0.9–4.8)
LYMPHOCYTES NFR BLD AUTO: 18.3 % (ref 19.6–45.3)
MCH RBC QN AUTO: 27.2 PG (ref 26.9–32)
MCHC RBC AUTO-ENTMCNC: 32.6 G/DL (ref 32.4–36.3)
MCV RBC AUTO: 83.4 FL (ref 80.5–98.2)
MONOCYTES # BLD AUTO: 0.51 10*3/MM3 (ref 0.2–1.2)
MONOCYTES NFR BLD AUTO: 7.4 % (ref 5–12)
NEUTROPHILS # BLD AUTO: 4.79 10*3/MM3 (ref 1.9–8.1)
NEUTROPHILS NFR BLD AUTO: 69.4 % (ref 42.7–76)
NITRITE UR QL STRIP: POSITIVE
PH UR STRIP.AUTO: 5.5 [PH] (ref 5–8)
PLATELET # BLD AUTO: 130 10*3/MM3 (ref 140–500)
PMV BLD AUTO: 10.7 FL (ref 6–12)
POTASSIUM BLD-SCNC: 4.3 MMOL/L (ref 3.5–5.2)
PROT SERPL-MCNC: 7.8 G/DL (ref 6–8.5)
PROT UR QL STRIP: ABNORMAL
RBC # BLD AUTO: 4.52 10*6/MM3 (ref 3.9–5.2)
RBC # UR: ABNORMAL /HPF
REF LAB TEST METHOD: ABNORMAL
SODIUM BLD-SCNC: 131 MMOL/L (ref 136–145)
SP GR UR STRIP: >=1.03 (ref 1–1.03)
SQUAMOUS #/AREA URNS HPF: ABNORMAL /HPF
UROBILINOGEN UR QL STRIP: ABNORMAL
WBC NRBC COR # BLD: 6.9 10*3/MM3 (ref 4.5–10.7)
WBC UR QL AUTO: ABNORMAL /HPF

## 2018-03-15 PROCEDURE — 96376 TX/PRO/DX INJ SAME DRUG ADON: CPT

## 2018-03-15 PROCEDURE — 81001 URINALYSIS AUTO W/SCOPE: CPT | Performed by: NURSE PRACTITIONER

## 2018-03-15 PROCEDURE — 0 IOPAMIDOL 61 % SOLUTION: Performed by: EMERGENCY MEDICINE

## 2018-03-15 PROCEDURE — 96375 TX/PRO/DX INJ NEW DRUG ADDON: CPT

## 2018-03-15 PROCEDURE — 25010000002 HYDROMORPHONE PER 4 MG: Performed by: NURSE PRACTITIONER

## 2018-03-15 PROCEDURE — 99284 EMERGENCY DEPT VISIT MOD MDM: CPT

## 2018-03-15 PROCEDURE — 85025 COMPLETE CBC W/AUTO DIFF WBC: CPT | Performed by: NURSE PRACTITIONER

## 2018-03-15 PROCEDURE — 25010000002 CEFTRIAXONE PER 250 MG: Performed by: NURSE PRACTITIONER

## 2018-03-15 PROCEDURE — 96365 THER/PROPH/DIAG IV INF INIT: CPT

## 2018-03-15 PROCEDURE — 74177 CT ABD & PELVIS W/CONTRAST: CPT

## 2018-03-15 PROCEDURE — 25010000002 ONDANSETRON PER 1 MG: Performed by: NURSE PRACTITIONER

## 2018-03-15 PROCEDURE — 80053 COMPREHEN METABOLIC PANEL: CPT | Performed by: NURSE PRACTITIONER

## 2018-03-15 RX ORDER — HYDROMORPHONE HYDROCHLORIDE 1 MG/ML
0.5 INJECTION, SOLUTION INTRAMUSCULAR; INTRAVENOUS; SUBCUTANEOUS ONCE
Status: COMPLETED | OUTPATIENT
Start: 2018-03-15 | End: 2018-03-15

## 2018-03-15 RX ORDER — SODIUM CHLORIDE 0.9 % (FLUSH) 0.9 %
10 SYRINGE (ML) INJECTION AS NEEDED
Status: DISCONTINUED | OUTPATIENT
Start: 2018-03-15 | End: 2018-03-15 | Stop reason: HOSPADM

## 2018-03-15 RX ORDER — HYDROXYZINE HYDROCHLORIDE 25 MG/1
50 TABLET, FILM COATED ORAL 3 TIMES DAILY PRN
COMMUNITY
End: 2020-12-10 | Stop reason: SDUPTHER

## 2018-03-15 RX ORDER — CEFTRIAXONE SODIUM 1 G/50ML
1 INJECTION, SOLUTION INTRAVENOUS ONCE
Status: COMPLETED | OUTPATIENT
Start: 2018-03-15 | End: 2018-03-15

## 2018-03-15 RX ORDER — ONDANSETRON 2 MG/ML
4 INJECTION INTRAMUSCULAR; INTRAVENOUS ONCE
Status: COMPLETED | OUTPATIENT
Start: 2018-03-15 | End: 2018-03-15

## 2018-03-15 RX ORDER — CEPHALEXIN 500 MG/1
500 CAPSULE ORAL 4 TIMES DAILY
Qty: 20 CAPSULE | Refills: 0 | Status: SHIPPED | OUTPATIENT
Start: 2018-03-15 | End: 2018-03-20

## 2018-03-15 RX ADMIN — IOPAMIDOL 85 ML: 612 INJECTION, SOLUTION INTRAVENOUS at 09:23

## 2018-03-15 RX ADMIN — CEFTRIAXONE SODIUM 1 G: 1 INJECTION, SOLUTION INTRAVENOUS at 10:53

## 2018-03-15 RX ADMIN — SODIUM CHLORIDE 1000 ML: 9 INJECTION, SOLUTION INTRAVENOUS at 08:24

## 2018-03-15 RX ADMIN — HYDROMORPHONE HYDROCHLORIDE 0.5 MG: 1 INJECTION, SOLUTION INTRAMUSCULAR; INTRAVENOUS; SUBCUTANEOUS at 08:23

## 2018-03-15 RX ADMIN — ONDANSETRON 4 MG: 2 INJECTION INTRAMUSCULAR; INTRAVENOUS at 10:58

## 2018-03-15 RX ADMIN — HYDROMORPHONE HYDROCHLORIDE 1 MG: 1 INJECTION, SOLUTION INTRAMUSCULAR; INTRAVENOUS; SUBCUTANEOUS at 10:58

## 2018-03-15 RX ADMIN — ONDANSETRON 4 MG: 2 INJECTION INTRAMUSCULAR; INTRAVENOUS at 08:23

## 2018-03-15 NOTE — ED PROVIDER NOTES
"  EMERGENCY DEPARTMENT ENCOUNTER    CHIEF COMPLAINT  Chief Complaint: R flank pain  History given by:Pt  History limited by:Nothing  Room Number: 11/11  PMD: Blanca Pitts MD   GI: Dr. Coleman      HPI:  Pt is a 57 y.o. female who presents with intermittent R lower back pain, onset two weeks ago that has slowly become more constant. She states her pain radiates into her R abdomen. She describes her pain as \"like natural child birth.\" She denies any exacerbating factors, but reports mild temporary relief with a heating pad. Patient also complains of vomiting yesterday, chills, and dysuria. Patient denies CP, SOA.  Past Medical History of diabetes and gastropesis for which she takes oxaydo.     Duration: two weeks  Timing:intermittent  Location:R lower back  Radiation:into R abdomen  Quality:\"like natural child birth\"  Intensity/Severity:moderate to severe  Progression:unchanged  Associated Symptoms:chills, vomiting, dysuria, R abd pain  Aggravating Factors:none  Alleviating Factors:heating pad  Previous Episodes:Pt reports a hx of gastroparesis, diabetes  Treatment before arrival:Pt reports she takes oxaydo for her gastroparesis    PAST MEDICAL HISTORY  Active Ambulatory Problems     Diagnosis Date Noted   • Cirrhosis of liver 03/08/2016   • Rheumatoid arthritis 03/08/2016   • Anxiety and depression 03/08/2016   • Type 2 diabetes mellitus, uncontrolled, with neuropathy 03/15/2016   • Fibrositis 03/15/2016   • Change in blood platelet count 03/15/2016   • Pancytopenia 04/12/2016   • Hypersplenism 04/12/2016   • Nausea 06/14/2016   • DUKES (nonalcoholic steatohepatitis) 06/14/2016   • Hyperlipidemia    • UGI bleed 02/15/2017   • Ascites 02/21/2017   • Portal hypertension 02/22/2017   • Systemic lupus 02/22/2017   • Secondary esophageal varices without bleeding 02/22/2017   • Secondary esophageal varices with bleeding 03/07/2017   • Gastroparesis 10/17/2017   • Cirrhosis of liver without ascites 11/17/2017   • Diabetic " ketoacidosis without coma associated with type 2 diabetes mellitus 12/16/2017   • Diabetic peripheral neuropathy 12/17/2017   • History of seizure disorder 12/17/2017     Resolved Ambulatory Problems     Diagnosis Date Noted   • Generalized abdominal pain 02/14/2017   • Upper GI bleed 04/25/2017     Past Medical History:   Diagnosis Date   • Anemia    • Anxiety    • Arthritis    • Chromosomal abnormality    • Cirrhosis    • Colon polyps    • Deafness    • Depression    • Diabetes mellitus    • Duodenal ulcer with hemorrhage    • Esophageal varices determined by endoscopy    • Fibromyalgia    • Gallbladder disease    • Gastroparesis    • Glaucoma    • Granuloma annulare    • H/O B12 deficiency    • H/O Bleeding ulcer    • H/O mixed connective tissue disorder    • Hemorrhoids    • Hiatal hernia    • History of transfusion    • Hx of being hospitalized    • Hyperlipidemia    • Migraine    • DUKES (nonalcoholic steatohepatitis) 11/2016   • BRICE (obstructive sleep apnea)    • Pancreas divisum    • Pancytopenia    • Peptic ulcer disease    • PONV (postoperative nausea and vomiting)    • RA (rheumatoid arthritis)    • Seizure disorder    • Seizures    • Systemic lupus        PAST SURGICAL HISTORY  Past Surgical History:   Procedure Laterality Date   • BLADDER REPAIR  1991   • CATARACT EXTRACTION     • CHOLECYSTECTOMY  1994   • ENDOSCOPY N/A 11/7/2016    Procedure: ESOPHAGOGASTRODUODENOSCOPY WITH COLD POLYPECTOMY, BANDING,  AND CLIPS TIMES 2;  Surgeon: Rich Coleman MD;  Location: [facility] ENDOSCOPY;  Service:    • ENDOSCOPY N/A 12/22/2016    Procedure: ESOPHAGOGASTRODUODENOSCOPY WITH ESOPHAGEAL BANDING. 5 BANDS FIRED, 4 BANDS ADHERERD TO MUCOSA;  Surgeon: Rich Coleman MD;  Location: [facility] ENDOSCOPY;  Service:    • ENDOSCOPY N/A 2/15/2017    Procedure: ESOPHAGOGASTRODUODENOSCOPY AT BEDSIDE with esophageal varices banding (3);  Surgeon: Rich Coleman MD;  Location: [facility] ENDOSCOPY;  Service:    • ENDOSCOPY N/A  "4/6/2017    Procedure: ESOPHAGOGASTRODUODENOSCOPY WITH ESOPHAGEAL VARICES BANDING x2;  Surgeon: Rich Coleman MD;  Location: [facility] ENDOSCOPY;  Service:    • ENDOSCOPY N/A 11/24/2017    Procedure: ESOPHAGOGASTRODUODENOSCOPY with biopsies;  Surgeon: Rich Coleman MD;  Location: [facility] ENDOSCOPY;  Service:    • ENDOSCOPY AND COLONOSCOPY  2014    Dr. Rich Coleman with findings of candida esophagitis   • EYE SURGERY     • HYSTERECTOMY  1986    partial   • JOINT REPLACEMENT     • KNEE SURGERY Right 1995    \"right knee recontstruction\"   • LIVER BIOPSY  01/2015   • MASS EXCISION     • STOMACH SURGERY         FAMILY HISTORY  Family History   Problem Relation Age of Onset   • Liver disease Other    • Depression Other    • Heart disease Other    • Hypertension Other    • Diabetes Other    • Breast cancer Other    • Brain cancer Other    • Uterine cancer Other    • Colon cancer Other    • Leukemia Other    • Colon cancer Maternal Aunt    • Hypertension Mother    • Diabetes type II Mother    • Rheum arthritis Mother    • Liver disease Mother      DUKES   • Heart disease Father    • Diabetes type II Father    • Diabetes type II Sister    • Lupus Sister    • Malig Hyperthermia Neg Hx        SOCIAL HISTORY  Social History     Social History   • Marital status:      Spouse name: Jose   • Number of children: N/A   • Years of education: N/A     Occupational History   •  Disabled     Social History Main Topics   • Smoking status: Former Smoker     Packs/day: 0.00     Years: 0.00     Quit date: 12/17/2015   • Smokeless tobacco: Never Used   • Alcohol use No   • Drug use: No   • Sexual activity: Defer     Other Topics Concern   • Not on file     Social History Narrative    Moved to [place] from [place]. She is currently medically disabled.         ALLERGIES  Albuterol; Tramadol; and Quinine derivatives    REVIEW OF SYSTEMS  Review of Systems   Constitutional: Positive for chills. Negative for fever.   HENT: Negative " for sore throat.    Respiratory: Negative for shortness of breath.    Cardiovascular: Negative for chest pain.   Gastrointestinal: Positive for abdominal pain (r sided) and vomiting. Negative for nausea.   Genitourinary: Positive for dysuria.   Musculoskeletal: Positive for back pain (R lower back).   Skin: Negative for rash.   Neurological: Negative for dizziness.   Psychiatric/Behavioral: The patient is not nervous/anxious.        PHYSICAL EXAM  ED Triage Vitals   Temp Heart Rate Resp BP SpO2   03/15/18 0740 03/15/18 0740 03/15/18 0740 03/15/18 0745 03/15/18 0740   96.1 °F (35.6 °C) 100 15 123/86 100 %      Temp src Heart Rate Source Patient Position BP Location FiO2 (%)   03/15/18 0740 03/15/18 0740 03/15/18 0746 03/15/18 0746 --   Tympanic Monitor Sitting Right arm          Physical Exam   Constitutional: She is well-developed, well-nourished, and in no distress.   HENT:   Head: Normocephalic.   Mouth/Throat: Mucous membranes are normal.   Eyes: No scleral icterus.   Neck: Normal range of motion.   Cardiovascular: Regular rhythm and normal heart sounds.  Tachycardia present.    Pulmonary/Chest: Effort normal and breath sounds normal.   Abdominal: Bowel sounds are normal. There is tenderness in the right upper quadrant and right lower quadrant. There is no CVA tenderness.   Musculoskeletal: Normal range of motion. She exhibits tenderness (R flank and R lower back).   Neurological: She is alert.   Skin: Skin is warm and dry.   Psychiatric: Mood and affect normal.   Nursing note and vitals reviewed.      LAB RESULTS  Recent Results (from the past 24 hour(s))   Comprehensive Metabolic Panel    Collection Time: 03/15/18  8:07 AM   Result Value Ref Range    Glucose 349 (H) 65 - 99 mg/dL    BUN 15 6 - 20 mg/dL    Creatinine 1.00 0.57 - 1.00 mg/dL    Sodium 131 (L) 136 - 145 mmol/L    Potassium 4.3 3.5 - 5.2 mmol/L    Chloride 94 (L) 98 - 107 mmol/L    CO2 23.9 22.0 - 29.0 mmol/L    Calcium 8.9 8.6 - 10.5 mg/dL    Total  Protein 7.8 6.0 - 8.5 g/dL    Albumin 3.20 (L) 3.50 - 5.20 g/dL    ALT (SGPT) 24 1 - 33 U/L    AST (SGOT) 31 1 - 32 U/L    Alkaline Phosphatase 137 (H) 39 - 117 U/L    Total Bilirubin 1.2 0.1 - 1.2 mg/dL    eGFR Non African Amer 58 (L) >60 mL/min/1.73    Globulin 4.6 gm/dL    A/G Ratio 0.7 g/dL    BUN/Creatinine Ratio 15.0 7.0 - 25.0    Anion Gap 13.1 mmol/L   CBC Auto Differential    Collection Time: 03/15/18  8:07 AM   Result Value Ref Range    WBC 6.90 4.50 - 10.70 10*3/mm3    RBC 4.52 3.90 - 5.20 10*6/mm3    Hemoglobin 12.3 11.9 - 15.5 g/dL    Hematocrit 37.7 35.6 - 45.5 %    MCV 83.4 80.5 - 98.2 fL    MCH 27.2 26.9 - 32.0 pg    MCHC 32.6 32.4 - 36.3 g/dL    RDW 15.0 (H) 11.7 - 13.0 %    RDW-SD 45.9 37.0 - 54.0 fl    MPV 10.7 6.0 - 12.0 fL    Platelets 130 (L) 140 - 500 10*3/mm3    Neutrophil % 69.4 42.7 - 76.0 %    Lymphocyte % 18.3 (L) 19.6 - 45.3 %    Monocyte % 7.4 5.0 - 12.0 %    Eosinophil % 4.1 0.3 - 6.2 %    Basophil % 0.4 0.0 - 1.5 %    Immature Grans % 0.4 0.0 - 0.5 %    Neutrophils, Absolute 4.79 1.90 - 8.10 10*3/mm3    Lymphocytes, Absolute 1.26 0.90 - 4.80 10*3/mm3    Monocytes, Absolute 0.51 0.20 - 1.20 10*3/mm3    Eosinophils, Absolute 0.28 0.00 - 0.70 10*3/mm3    Basophils, Absolute 0.03 0.00 - 0.20 10*3/mm3    Immature Grans, Absolute 0.03 0.00 - 0.03 10*3/mm3   Urinalysis With / Microscopic If Indicated - Urine, Clean Catch    Collection Time: 03/15/18  9:49 AM   Result Value Ref Range    Color, UA Dark Yellow (A) Yellow, Straw    Appearance, UA Cloudy (A) Clear    pH, UA 5.5 5.0 - 8.0    Specific Gravity, UA >=1.030 1.005 - 1.030    Glucose,  mg/dL (1+) (A) Negative    Ketones, UA Negative Negative    Bilirubin, UA Negative Negative    Blood, UA Moderate (2+) (A) Negative    Protein, UA 30 mg/dL (1+) (A) Negative    Leuk Esterase, UA Large (3+) (A) Negative    Nitrite, UA Positive (A) Negative    Urobilinogen, UA 1.0 E.U./dL 0.2 - 1.0 E.U./dL   Urinalysis, Microscopic Only - Urine, Clean  Catch    Collection Time: 03/15/18  9:49 AM   Result Value Ref Range    RBC, UA Unable to determine due to loaded field (A) None Seen, 0-2 /HPF    WBC, UA Too Numerous to Count (A) None Seen, 0-2 /HPF    Bacteria, UA Unable to determine due to loaded field (A) None Seen /HPF    Squamous Epithelial Cells, UA Unable to determine due to loaded field (A) None Seen, 0-2 /HPF    Hyaline Casts, UA Unable to determine due to loaded field None Seen /LPF    Methodology Manual Light Microscopy        I ordered the above labs and reviewed the results    RADIOLOGY  CT Abdomen Pelvis With Contrast   Preliminary Result   There is no ascites or acute abnormality.       Discussed with JOEL Lott.              I ordered the above noted radiological studies and reviewed the images on the PACS system.  Spoke with Dr. Mcbride regarding CT scan results      PROGRESS AND CONSULTS    1038 - Reviewed pt's history and workup with Dr. Tay.  At bedside evaluation, they agree with the plan of care.    1039 - Rechecked pt. Pt is resting comfortably in NAD. Informed pt of the results of her lab work which shows UTI and her CT abd/pelvis which was unremarkable. D/w pt the plan to discharge home after dose of IV abx with a prescription for abx and a follow up with PCP. Pt understands and agrees with plan. All questions answered.     1040 - Reviewed implications of results, diagnosis, meds, responsibility to follow up, warning signs and symptoms of possible worsening, potential complications and reasons to return to ER with patient.  Discussed all results and noted any abnormalities with patient.  Discussed absolute need to recheck abnormalities with PCP.     Discussed plan for discharge, as there is no emergent indication for admission.  Pt is agreeable and understands need for follow up and repeat testing.  Pt is aware that discharge does not mean that nothing is wrong but it indicates no emergency is present.  Pt is discharged  "with instructions to follow up with primary care doctor to have their blood pressure rechecked.       DIAGNOSIS  Final diagnoses:   Acute UTI   Right sided abdominal pain       FOLLOW UP   Blanca Pitts MD  [address]  [place] [id number] [phone]    Schedule an appointment as soon as possible for a visit in 1 week        RX     Medication List      New Prescriptions    cephalexin 500 MG capsule  Commonly known as:  KEFLEX  Take 1 capsule by mouth 4 (Four) Times a Day.       COURSE & MEDICAL DECISION MAKING  Pertinent Labs and Imaging studies that were ordered and reviewed are noted above.  Results were reviewed/discussed with the patient and they were also made aware of online assess.   Pt also made aware that some labs, such as cultures, will not be resulted during ER visit and follow up with PMD is necessary.     MEDICATIONS GIVEN IN ER  Medications   sodium chloride 0.9 % flush 10 mL (not administered)   cefTRIAXone (ROCEPHIN) IVPB 1 g (not administered)   sodium chloride 0.9 % bolus 1,000 mL (0 mL Intravenous Stopped 3/15/18 1002)   HYDROmorphone (DILAUDID) injection 0.5 mg (0.5 mg Intravenous Given 3/15/18 0823)   ondansetron (ZOFRAN) injection 4 mg (4 mg Intravenous Given 3/15/18 0823)   iopamidol (ISOVUE-300) 61 % injection 100 mL (85 mL Intravenous Given 3/15/18 0923)       /86 (BP Location: Right arm, Patient Position: Sitting)   Pulse 111   Temp 96.1 °F (35.6 °C) (Tympanic)   Resp 15   Ht 168.9 cm (66.5\")   Wt 79.8 kg (176 lb)   SpO2 93%   BMI 27.98 kg/m²       I personally reviewed the past medical history, past surgical history, social history, family history, current medications and allergies as they appear in this chart.  The scribe's note accurately reflects the work and decisions made by me.     Documentation assistance provided by tori Cano for JAYSON Cason on 3/15/2018 at 10:47 AM. Information recorded by the scribe was done at my direction " and has been verified and validated by me.       Shankar Cano  03/15/18 1047       JOEL Valdez  03/15/18 [id number]

## 2018-03-15 NOTE — ED NOTES
Pt reports a pain in the lower right flank starting about 2 weeks ago that has increased in pain upon urination. Pt states it is difficult to sleep at night due to the pain. Currently resting comfortably. Breathing evenly and unlabored with no acute distress noted. Mildly tachycardic. Skin is warm, pink, and dry.      Guillermo Mendoza RN  03/15/18 [id number]

## 2018-03-15 NOTE — ED NOTES
Pt takes a medication called Domperidone from [facility] which was not in our med list. Dosage is 10mg.      Guillermo Mendoza RN  03/15/18 0814

## 2018-03-15 NOTE — ED PROVIDER NOTES
MD ATTESTATION NOTE    The AYESHA and I have discussed this patient's history, physical exam, and treatment plan.  I have reviewed the documentation and personally had a face to face interaction with the patient. I affirm the documentation and agree with the treatment and plan.  The attached note describes my personal findings.      Pt presents to the ED c/o intermittent episodes of right flank pain onset about 2 weeks ago. Pt reports that her episodes of flank pain have increased in frequency over the past several days. Pt denies diarrhea, chest pain, and dyspnea, but reports that she has had chills, dysuria, nausea, and vomiting. Pt suspects that she has a UTI. On physical exam, pt is alert and oriented x3. Pt's CT Abd is negative acute. However, pt's UA suggests UTI. Pt will be given dose of Rocephin in the ER and will be discharged with rx for antibiotics. Pt was advised to f/u with PMD. RTER warnings given. Pt will be discharged.           Documentation assistance provided by Arnoldo Lucio. Information recorded by the scribe was done at my direction and has been verified and validated by me.     Entered by Arnoldo Lucio, acting as scribe for Dr. Jasvir MD.        Arnoldo Lucio  03/15/18 [id number]       Vitor Tay MD  03/15/18 3310

## 2018-03-15 NOTE — DISCHARGE INSTRUCTIONS
Medications as ordered  Increase water intake  Urine culture back in 48 hours, will call if your antibiotic needs to be changed  Follow up with pmd in 1 week for recheck  Return to er for fever, chills, vomiting, decreased urine output, increased pain or any new or worsening symptoms

## 2018-03-16 ENCOUNTER — TELEPHONE (OUTPATIENT)
Dept: SOCIAL WORK | Facility: HOSPITAL | Age: 58
End: 2018-03-16

## 2018-03-16 NOTE — TELEPHONE ENCOUNTER
ED f/u phone call: states she is taking prescribed antibx and feeling better. Reminded to f/u w/ PCP next week. No questions/concerns

## 2018-03-20 ENCOUNTER — LAB (OUTPATIENT)
Dept: LAB | Facility: HOSPITAL | Age: 58
End: 2018-03-20
Attending: INTERNAL MEDICINE

## 2018-03-20 ENCOUNTER — OFFICE VISIT (OUTPATIENT)
Dept: GASTROENTEROLOGY | Facility: CLINIC | Age: 58
End: 2018-03-20

## 2018-03-20 VITALS
DIASTOLIC BLOOD PRESSURE: 75 MMHG | HEART RATE: 114 BPM | WEIGHT: 159 LBS | BODY MASS INDEX: 24.96 KG/M2 | HEIGHT: 67 IN | SYSTOLIC BLOOD PRESSURE: 111 MMHG

## 2018-03-20 DIAGNOSIS — N30.01 ACUTE CYSTITIS WITH HEMATURIA: Primary | ICD-10-CM

## 2018-03-20 DIAGNOSIS — N30.01 ACUTE CYSTITIS WITH HEMATURIA: ICD-10-CM

## 2018-03-20 PROCEDURE — 87186 SC STD MICRODIL/AGAR DIL: CPT

## 2018-03-20 PROCEDURE — 87086 URINE CULTURE/COLONY COUNT: CPT

## 2018-03-20 PROCEDURE — 99214 OFFICE O/P EST MOD 30 MIN: CPT | Performed by: INTERNAL MEDICINE

## 2018-03-20 RX ORDER — SULFAMETHOXAZOLE AND TRIMETHOPRIM 400; 80 MG/1; MG/1
1 TABLET ORAL 2 TIMES DAILY
Qty: 14 TABLET | Refills: 0 | Status: SHIPPED | OUTPATIENT
Start: 2018-03-20 | End: 2018-03-30

## 2018-03-20 NOTE — PROGRESS NOTES
Subjective   Silvia Zabala is a 57 y.o. female is here today for follow-up.  Chief Complaint   Patient presents with   • Cirrhosis   • Abdominal Pain   • Nausea   • Dizziness   • Vomiting       History of Present Illness  The above symptoms are long-standing.  She felt that she was getting worse recently with the dizziness, nausea, and general loss of sense of well-being.  She was recently evaluated which included usual blood work which for her was stable, and she had a urinalysis that was consistent with cystitis but did not have urine cultures obtained as best as I can tell.  She was started on Keflex and this does not seem to help.  She's had no hematemesis melena or hematochezia and reports no fever. Blood sugars have been quite high, and is been measured lately as high as 400.  The following portions of the patient's history were reviewed and updated as appropriate: allergies, current medications, past family history, past medical history, past social history, past surgical history and problem list.    Review of Systems   Respiratory: Negative for shortness of breath.    Cardiovascular: Negative for chest pain.   All other systems reviewed and are negative.      Objective   Physical Exam   Constitutional: She appears well-developed and well-nourished.   Nursing note and vitals reviewed.    She has a benign abdomen and minimal asterixis.    Assessment/Plan   Problems Addressed this Visit     None      Visit Diagnoses     Acute cystitis with hematuria    -  Primary    Relevant Orders    Urine Culture - Urine, Urine, Clean Catch (Completed)        I believe her dominant issues are urinary tract infection which have led to deterioration of her diabetic control and is reflected with more pronounced upper tract symptoms.  I would like to get a urine culture and we'll discontinue Keflex and start her on Septra.  I am encouraging her to follow up with her endocrinologist, this week would not be too quickly.

## 2018-03-22 LAB
BACTERIA SPEC AEROBE CULT: ABNORMAL
BACTERIA SPEC AEROBE CULT: ABNORMAL

## 2018-03-23 ENCOUNTER — TELEPHONE (OUTPATIENT)
Dept: GASTROENTEROLOGY | Facility: CLINIC | Age: 58
End: 2018-03-23

## 2018-03-23 DIAGNOSIS — F41.9 ANXIETY: ICD-10-CM

## 2018-03-23 NOTE — TELEPHONE ENCOUNTER
----- Message from Rich Coleman MD sent at 3/21/2018  1:34 PM EDT -----  Please call the patient regarding her abnormal result. ID and sensitivities pending.

## 2018-03-23 NOTE — TELEPHONE ENCOUNTER
Pt notified of abnormal urine cx results as reviewed by Dr Coleman, positive for Klebsiella.  Dr Coleman was waiting on sensitivity testing because he changed her antibiotic to Bactrim.  It appears that sensitivity testing is now complete and that infection is susceptible to Bactrim.  Will await Dr Coleman's further review.

## 2018-03-26 ENCOUNTER — TELEPHONE (OUTPATIENT)
Dept: GASTROENTEROLOGY | Facility: CLINIC | Age: 58
End: 2018-03-26

## 2018-03-26 RX ORDER — ALPRAZOLAM 0.5 MG/1
TABLET ORAL
Qty: 60 TABLET | Refills: 2 | OUTPATIENT
Start: 2018-03-26 | End: 2018-07-11 | Stop reason: SDUPTHER

## 2018-03-26 NOTE — TELEPHONE ENCOUNTER
----- Message from Rich Coleman MD sent at 3/23/2018  4:15 PM EDT -----  Please call the patient regarding her abnormal result. It is sensitive to Sulfa.

## 2018-03-26 NOTE — TELEPHONE ENCOUNTER
Pt already aware of positive urine cx and that it is sensitive to Sulfa as reviewed by Dr Coleman.

## 2018-03-29 NOTE — TELEPHONE ENCOUNTER
PATIENT CALLED OFFICE STATING THAT SHE IS HAVING TROUBLE GETTING HER INSULIN     SPOKE WITH [facility] THEY STATED THAT PATIENT NEEDED A PA FOR QTY OF THE INSURANCE.

## 2018-03-30 ENCOUNTER — HOSPITAL ENCOUNTER (OUTPATIENT)
Dept: GENERAL RADIOLOGY | Facility: HOSPITAL | Age: 58
Discharge: HOME | End: 2018-03-30
Attending: INTERNAL MEDICINE | Admitting: INTERNAL MEDICINE

## 2018-03-30 ENCOUNTER — OFFICE VISIT (OUTPATIENT)
Dept: INTERNAL MEDICINE | Facility: CLINIC | Age: 58
End: 2018-03-30

## 2018-03-30 VITALS
DIASTOLIC BLOOD PRESSURE: 80 MMHG | HEIGHT: 66 IN | OXYGEN SATURATION: 98 % | HEART RATE: 124 BPM | WEIGHT: 159 LBS | SYSTOLIC BLOOD PRESSURE: 120 MMHG | BODY MASS INDEX: 25.55 KG/M2

## 2018-03-30 DIAGNOSIS — N39.0 URINARY TRACT INFECTION WITHOUT HEMATURIA, SITE UNSPECIFIED: Primary | ICD-10-CM

## 2018-03-30 DIAGNOSIS — N94.9 VAGINAL DISCOMFORT: ICD-10-CM

## 2018-03-30 DIAGNOSIS — R05.9 COUGH: ICD-10-CM

## 2018-03-30 DIAGNOSIS — R10.9 FLANK PAIN: ICD-10-CM

## 2018-03-30 LAB
BACTERIA UR QL AUTO: ABNORMAL /HPF
BILIRUB UR QL STRIP: NEGATIVE
CLARITY UR: CLEAR
COLOR UR: YELLOW
GLUCOSE UR STRIP-MCNC: ABNORMAL MG/DL
HGB UR QL STRIP.AUTO: ABNORMAL
HYALINE CASTS UR QL AUTO: ABNORMAL /LPF
KETONES UR QL STRIP: ABNORMAL
LEUKOCYTE ESTERASE UR QL STRIP.AUTO: NEGATIVE
NITRITE UR QL STRIP: NEGATIVE
PH UR STRIP.AUTO: 5.5 [PH] (ref 5–8)
PROT UR QL STRIP: NEGATIVE
RBC # UR: ABNORMAL /HPF
REF LAB TEST METHOD: ABNORMAL
SP GR UR STRIP: >=1.03 (ref 1–1.03)
SQUAMOUS #/AREA URNS HPF: ABNORMAL /HPF
UROBILINOGEN UR QL STRIP: ABNORMAL
WBC UR QL AUTO: ABNORMAL /HPF

## 2018-03-30 PROCEDURE — 71046 X-RAY EXAM CHEST 2 VIEWS: CPT

## 2018-03-30 PROCEDURE — 87086 URINE CULTURE/COLONY COUNT: CPT | Performed by: INTERNAL MEDICINE

## 2018-03-30 PROCEDURE — 81001 URINALYSIS AUTO W/SCOPE: CPT | Performed by: INTERNAL MEDICINE

## 2018-03-30 PROCEDURE — 99213 OFFICE O/P EST LOW 20 MIN: CPT | Performed by: INTERNAL MEDICINE

## 2018-03-30 RX ORDER — PROMETHAZINE HYDROCHLORIDE 25 MG/1
TABLET ORAL
Qty: 30 TABLET | Refills: 0 | Status: SHIPPED | OUTPATIENT
Start: 2018-03-30 | End: 2018-05-10 | Stop reason: SDUPTHER

## 2018-03-30 NOTE — PROGRESS NOTES
Chief Complaint   Patient presents with   • Urinary Tract Infection   • Cough       Subjective   Silvia Zabala is a 57 y.o. female.     Urinary Tract Infection    This is a new problem. The current episode started 1 to 4 weeks ago. The problem has been unchanged (she reports dysuria and right flank pain). The maximum temperature recorded prior to her arrival was 100 - 100.9 F (has had intermittent low grade fever). There is no history of pyelonephritis. Associated symptoms include chills and flank pain. Pertinent negatives include no frequency, hematuria or urgency. She has tried antibiotics for the symptoms. The treatment provided no relief.   Cough   This is a new problem. The current episode started in the past 7 days. The problem has been unchanged. The cough is non-productive (she feels there is mucus in the back of her throat). Associated symptoms include chills, headaches (migraines), postnasal drip and shortness of breath. Pertinent negatives include no wheezing. The symptoms are aggravated by lying down. She has tried body position changes (she has been using more than on pillow when she lies down) for the symptoms. The treatment provided moderate relief.     She has completed a course of Bactrim for UTI.  She took it for 7 days.  However, she still feels as if she has painful urination and flank pain.  She was also seen in the emergency room for flank pain and right lower abdominal pain on March 15..  CT scan of abdomen and pelvis was unremarkable.    The following portions of the patient's history were reviewed and updated as appropriate: allergies, current medications, past family history, past medical history, past social history, past surgical history and problem list.    Review of Systems   Constitutional: Positive for appetite change (decreased) and chills.   HENT: Positive for postnasal drip. Negative for sinus pressure.    Eyes: Positive for photophobia (with migraines).   Respiratory: Positive  for cough, chest tightness and shortness of breath. Negative for wheezing.    Cardiovascular: Positive for palpitations. Negative for leg swelling.   Genitourinary: Positive for flank pain. Negative for frequency, hematuria and urgency.   Neurological: Positive for headaches (migraines).         Current Outpatient Prescriptions:   •  ALPRAZolam (XANAX) 0.5 MG tablet, TAKE ONE TABLET BY MOUTH TWICE A DAY AS NEEDED FOR ANXIETY, Disp: 60 tablet, Rfl: 2  •  buPROPion XL (WELLBUTRIN XL) 150 MG 24 hr tablet, Take 1 tablet by mouth Daily., Disp: 90 tablet, Rfl: 1  •  cholecalciferol (VITAMIN D3) 1000 UNITS tablet, Take 1,000 Units by mouth Daily., Disp: , Rfl:   •  cycloSPORINE (RESTASIS) 0.05 % ophthalmic emulsion, 1 drop 2 (two) times a day., Disp: , Rfl:   •  DULoxetine (CYMBALTA) 30 MG capsule, Take 90 mg by mouth Daily., Disp: , Rfl:   •  ferrous sulfate 324 (65 Fe) MG tablet delayed-release EC tablet, Take 324 mg by mouth Daily With Breakfast., Disp: , Rfl:   •  folic acid (FOLVITE) 1 MG tablet, Take 1 mg by mouth Daily., Disp: , Rfl:   •  glucose blood (ONE TOUCH ULTRA TEST) test strip, Test blood sugar 4 times daily, Disp: 200 each, Rfl: 5  •  hydrOXYzine (ATARAX) 25 MG tablet, Take 25 mg by mouth 3 (Three) Times a Day As Needed for Itching., Disp: , Rfl:   •  insulin aspart (NOVOLOG FLEXPEN) 100 UNIT/ML solution pen-injector sc pen, Inject 45 units under the skin 3 times daily before meals and 25 units under the skin for snacks, Disp: 20 pen, Rfl: 3  •  insulin detemir (LEVEMIR FLEXPEN) 100 UNIT/ML injection, Inject 60 units in the Am and 75 units at night, Disp: 15 pen, Rfl: 3  •  insulin glargine (LANTUS) 100 UNIT/ML injection, Inject 80 Units under the skin Every Night., Disp: , Rfl:   •  Insulin Pen Needle 31G X 5 MM misc, To inject 5 -6 times per day, Disp: 200 each, Rfl: 3  •  levETIRAcetam (KEPPRA) 500 MG tablet, TAKE ONE TABLET BY MOUTH TWICE A DAY, Disp: 60 tablet, Rfl: 0  •  metFORMIN (GLUCOPHAGE) 1000  "MG tablet, TAKE ONE TABLET BY MOUTH TWICE A DAY WITH MEALS, Disp: 60 tablet, Rfl: 3  •  Multiple Vitamin (MULTI-VITAMIN PO), Take 1 tablet by mouth Daily., Disp: , Rfl:   •  OxyCODONE HCl (OXAYDO) 7.5 MG tablet , Take 7.5 mg by mouth Every 8 (Eight) Hours As Needed., Disp: , Rfl:   •  pantoprazole (PROTONIX) 40 MG EC tablet, Take 40 mg by mouth Daily., Disp: , Rfl:   •  pregabalin (LYRICA) 75 MG capsule, Take 1 capsule by mouth 2 (Two) Times a Day., Disp: 60 capsule, Rfl: 2  •  promethazine (PHENERGAN) 25 MG tablet, TAKE ONE TABLET BY MOUTH EVERY 6 HOURS AS NEEDED FOR NAUSEA OR VOMITING, Disp: 30 tablet, Rfl: 0  •  RELISTOR 150 MG tablet, 150 mg Daily., Disp: , Rfl:   •  rifaximin (XIFAXAN) 550 MG tablet, Take 550 mg by mouth Every 12 (Twelve) Hours., Disp: , Rfl:   •  spironolactone (ALDACTONE) 100 MG tablet, Take 1 tablet by mouth Daily., Disp: 30 tablet, Rfl: 0  •  sucralfate (CARAFATE) 1 g tablet, Take 1 g by mouth 2 (Two) Times a Day., Disp: , Rfl:   •  Unable to find, 1 each 1 (One) Time. Domperidone 10mg TID, Disp: , Rfl:   •  vitamin B-12 (CYANOCOBALAMIN) 1000 MCG tablet, Take 1,000 mcg by mouth daily., Disp: , Rfl:   •  Fluticasone Propionate, Inhal, (FLOVENT DISKUS) 100 MCG/BLIST aerosol powder , Inhale 1 puff 2 (Two) Times a Day., Disp: 60 each, Rfl: 3        Objective     Vitals:    03/30/18 0809   BP: 120/80   BP Location: Left arm   Pulse: (!) 124   SpO2: 98%   Weight: 72.1 kg (159 lb)   Height: 168.9 cm (66.5\")       Physical Exam   Constitutional: She is oriented to person, place, and time. She appears well-developed and well-nourished. No distress.   Cardiovascular: Regular rhythm, S1 normal and S2 normal.  Tachycardia present.  Exam reveals no gallop and no friction rub.    No murmur heard.  Pulmonary/Chest: Effort normal and breath sounds normal. No respiratory distress. She has no wheezes. She has no rhonchi. She has no rales. Chest wall is not dull to percussion.   Abdominal: There is CVA " tenderness (right).   Musculoskeletal: She exhibits no edema.   Lymphadenopathy:     She has no cervical adenopathy.   Neurological: She is alert and oriented to person, place, and time.   Skin: Skin is warm and dry.   Nursing note and vitals reviewed.        Assessment/Plan   Silvia was seen today for urinary tract infection and cough.    Diagnoses and all orders for this visit:    Urinary tract infection without hematuria, site unspecified  -     Cancel: Urinalysis With / Microscopic If Indicated - Urine, Clean Catch; Future  -     Urinalysis With / Culture If Indicated - Urine, Clean Catch; Future  -     Cancel: Urinalysis With / Culture If Indicated - Urine, Clean Catch  -     Urinalysis With / Culture If Indicated - Urine, Clean Catch; Future  -     Urinalysis With / Culture If Indicated - Urine, Clean Catch    Cough  -     XR Chest PA & Lateral    Vaginal discomfort  -     Ambulatory Referral to Gynecology    Other orders  -     Fluticasone Propionate, Inhal, (FLOVENT DISKUS) 100 MCG/BLIST aerosol powder ; Inhale 1 puff 2 (Two) Times a Day.      Urine dipstick here is negative for nitrites and leukocytes.  Advised her we should hold on antibiotics for now.  Chest x-ray for further evaluation of cough.  She also reports problems with recurrent vaginal discomfort.  She has been treated for vaginal yeast infection.  It has not helped.

## 2018-04-01 LAB
BACTERIA SPEC AEROBE CULT: NORMAL
BACTERIA SPEC AEROBE CULT: NORMAL

## 2018-04-02 ENCOUNTER — TELEPHONE (OUTPATIENT)
Dept: INTERNAL MEDICINE | Facility: CLINIC | Age: 58
End: 2018-04-02

## 2018-04-02 ENCOUNTER — PRIOR AUTHORIZATION (OUTPATIENT)
Dept: ENDOCRINOLOGY | Age: 58
End: 2018-04-02

## 2018-04-02 NOTE — TELEPHONE ENCOUNTER
----- Message from Emily Brandt sent at 3/30/2018  2:52 PM EDT -----  Contact: [facility] pharmacy  Pharmacy calling to clarify Fluticasone Propionate, Inhal, (FLOVENT DISKUS) 100 MCG/BLIST aerosol powder. Please advise    [facility][phone]

## 2018-04-02 NOTE — TELEPHONE ENCOUNTER
PT'S PA FOR LEVEMIR WAS SUBMITTED TO [employer] ON 4/2/18 AND WAS APPROVED. PHARMACY WAS NOTIFIED.

## 2018-04-02 NOTE — TELEPHONE ENCOUNTER
Luis wanted to make sure you want pt to do 1 puff bid. Said this is usually ordered for 1 puff daily.  Please advise.

## 2018-04-04 RX ORDER — DULOXETIN HYDROCHLORIDE 30 MG/1
CAPSULE, DELAYED RELEASE ORAL
Qty: 90 CAPSULE | Refills: 3 | Status: SHIPPED | OUTPATIENT
Start: 2018-04-04 | End: 2018-12-13

## 2018-04-05 ENCOUNTER — OFFICE VISIT (OUTPATIENT)
Dept: ENDOCRINOLOGY | Age: 58
End: 2018-04-05

## 2018-04-05 ENCOUNTER — TELEPHONE (OUTPATIENT)
Dept: ENDOCRINOLOGY | Age: 58
End: 2018-04-05

## 2018-04-05 VITALS
DIASTOLIC BLOOD PRESSURE: 76 MMHG | SYSTOLIC BLOOD PRESSURE: 138 MMHG | HEIGHT: 67 IN | OXYGEN SATURATION: 98 % | BODY MASS INDEX: 26.53 KG/M2 | WEIGHT: 169 LBS | HEART RATE: 120 BPM

## 2018-04-05 DIAGNOSIS — IMO0002 TYPE 2 DIABETES MELLITUS, UNCONTROLLED, WITH NEUROPATHY: ICD-10-CM

## 2018-04-05 DIAGNOSIS — K31.84 GASTROPARESIS: Primary | ICD-10-CM

## 2018-04-05 DIAGNOSIS — E11.42 DIABETIC PERIPHERAL NEUROPATHY (HCC): ICD-10-CM

## 2018-04-05 PROCEDURE — 99214 OFFICE O/P EST MOD 30 MIN: CPT | Performed by: INTERNAL MEDICINE

## 2018-04-05 NOTE — TELEPHONE ENCOUNTER
Spoke with pharmacist about qty of insulin  Patient should need 2 boxes       ----- Message from Bethany Berry sent at 4/5/2018 11:55 AM EDT -----  Contact: [facility]   PATIENT/PHAMRCY REQUEST TO REFIL MEDICATION:  MEDICATION:Insulin Regular Human, Conc, (HUMULIN R U-500 KWIKPEN) 500 UNIT/ML solution pen-injector CONCENTRATED injection     MESSAGE:  [facility] HAS CALLED IN REGARDS TO ABOVE MEDICATION. THEY ARE  NEEDING ON CLARIFICATION  ON THE A QUANTITY  . THE ABOVE MEDICATION ONLY HAS 2 PENS PER BOX  , WHERE THE DIRECTIONS HAS FIVE. SO THEY ARE UNSURE IF ITS 5 BEEN OR 5 BOXES.    PHARMACY  IS LOOKING FOR A CALL BACK      PHONE NUMBER:  [phone]

## 2018-04-05 NOTE — PROGRESS NOTES
57 y.o.    Patient Care Team:  Blanca Pitts MD as PCP - General (Internal Medicine)  Sukhdeep Mcdowell MD as Consulting Physician (Hematology and Oncology)  Blanca Pitts MD as Referring Physician (Internal Medicine)    Chief Complaint:    6 WEEK FOLLOW UP/ TYPE 2 DIABETES MELLITUS  Subjective     HPI     Silvia Zabala,57 y.o. WF is here as a follow up for the management of Type 2 dm. Referred by .   Patient was hospitalized in November 2017 with elevated blood sugars, DKA and gastroparesis flareup.  Today in the clinic she still feels miserable with the gastroparesis and also her blood sugars are significantly uncontrolled.    Type 2 diabetes mellitus  Diagnosed in her early 20s.  Insulin was started about 10-15 years ago.  Currently on levemir 60 units in the morning and 75 units in the evening, NovoLog 45 units with each meal, NovoLog 25 units with snack and moderate dose sliding scale with each meal and at snacks.  Checks BG 4 - 5 times a day  She got her glucometer for me to review at most of her blood sugars are in 400s range.  She did not have any low blood sugars.  Does have hypoglycemic awareness.  Highest blood sugar in the last 1 month was 413 mg/dL.  Last eye examination was in January and no history of diabetic retinopathy.  C/o tingling and numbness in her feet, no ulcers or amputation in her feet. On lyrica 75 mg po bid, which per pt is helping her.   No CAD, CVA or CKD per pt.   Did have diabetic education and eats multiple meals a day due to her liver disease. She does try to follow diabetic diet.   She doesn't exercise due to arthritis.   Negative urine microalbuminuria.       Pancreatic divisum : No hx of pancreatitis per pt.       Liver disease/DUKES - She is required to eat 80 gm of protein per day and she drinks two Glucerna shakes at bed time. Sees Dr. Jordan from [facility].       Gastroparesis - Sees Dr. Coleman.  Patient's gastroparesis medications and also being  adjusted.      Hyperlipidemia - on pravastatin 40 mg po daily.     Advised the patient to go to the emergency department as her blood sugars are significantly uncontrolled.       Interval History      The following portions of the patient's history were reviewed and updated as appropriate: allergies, current medications, past family history, past medical history, past social history, past surgical history and problem list.    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   • Cirrhosis    • Colon polyps    • Deafness    • Depression    • Diabetes mellitus     Adult onset, insulin requiring   • Duodenal ulcer with hemorrhage    • Esophageal varices determined by endoscopy    • Fibromyalgia    • Gallbladder disease    • Gastroparesis    • Glaucoma    • Granuloma annulare    • H/O B12 deficiency    • H/O Bleeding ulcer     And gastroesophageal varices   • H/O mixed connective tissue disorder    • Hemorrhoids    • Hiatal hernia    • History of transfusion    • Hx of being hospitalized     In [place] for GI bleeding from ulcer and varices   • Hyperlipidemia    • Migraine    • DUKES (nonalcoholic steatohepatitis) 11/2016   • BRICE (obstructive sleep apnea)    • Pancreas divisum    • Pancytopenia     Chronic, due to cirrhosis and hypersplenism   • Peptic ulcer disease     And esophageal varices   • PONV (postoperative nausea and vomiting)    • RA (rheumatoid arthritis)    • Seizure disorder     LAST ONE SEVERAL YEARS AGO   • Seizures    • Systemic lupus      Family History   Problem Relation Age of Onset   • Liver disease Other    • Depression Other    • Heart disease Other    • Hypertension Other    • Diabetes Other    • Breast cancer Other    • Brain cancer Other    • Uterine cancer Other    • Colon cancer Other    • Leukemia Other    • Colon  "cancer Maternal Aunt    • Hypertension Mother    • Diabetes type II Mother    • Rheum arthritis Mother    • Liver disease Mother      DUKES   • Heart disease Father    • Diabetes type II Father    • Diabetes type II Sister    • Lupus Sister    • Malig Hyperthermia Neg Hx      Social History     Social History   • Marital status:      Spouse name: Jose   • Number of children: N/A   • Years of education: N/A     Occupational History   •  Disabled     Social History Main Topics   • Smoking status: Former Smoker     Packs/day: 0.00     Years: 0.00     Quit date: 12/17/2015   • Smokeless tobacco: Never Used   • Alcohol use No   • Drug use: No   • Sexual activity: Defer     Other Topics Concern   • Not on file     Social History Narrative    Moved to [place] from [place]. She is currently medically disabled.     Allergies   Allergen Reactions   • Albuterol Anaphylaxis   • Lactulose Nausea And Vomiting and Other (See Comments)     Severe abdominal pain   • Tramadol Other (See Comments)     Per pt causes her \"palsy, meaning shaking and tremors\" and gi upset, nausea   • Quinine Derivatives Nausea And Vomiting       Current Outpatient Prescriptions:   •  ALPRAZolam (XANAX) 0.5 MG tablet, TAKE ONE TABLET BY MOUTH TWICE A DAY AS NEEDED FOR ANXIETY, Disp: 60 tablet, Rfl: 2  •  buPROPion XL (WELLBUTRIN XL) 150 MG 24 hr tablet, Take 1 tablet by mouth Daily., Disp: 90 tablet, Rfl: 1  •  cholecalciferol (VITAMIN D3) 1000 UNITS tablet, Take 1,000 Units by mouth Daily., Disp: , Rfl:   •  cycloSPORINE (RESTASIS) 0.05 % ophthalmic emulsion, 1 drop 2 (two) times a day., Disp: , Rfl:   •  DULoxetine (CYMBALTA) 30 MG capsule, TAKE THREE CAPSULES BY MOUTH DAILY, Disp: 90 capsule, Rfl: 3  •  ferrous sulfate 324 (65 Fe) MG tablet delayed-release EC tablet, Take 324 mg by mouth Daily With Breakfast., Disp: , Rfl:   •  Fluticasone Propionate, Inhal, 100 MCG/BLIST aerosol powder , Inhale 1 puff Daily., Disp: , Rfl:   •  folic acid " (FOLVITE) 1 MG tablet, Take 1 mg by mouth Daily., Disp: , Rfl:   •  glucose blood (ONE TOUCH ULTRA TEST) test strip, Test blood sugar 4 times daily, Disp: 200 each, Rfl: 5  •  hydrOXYzine (ATARAX) 25 MG tablet, Take 25 mg by mouth 3 (Three) Times a Day As Needed for Itching., Disp: , Rfl:   •  Insulin Pen Needle 31G X 5 MM misc, To inject 5 -6 times per day, Disp: 200 each, Rfl: 3  •  levETIRAcetam (KEPPRA) 500 MG tablet, TAKE ONE TABLET BY MOUTH TWICE A DAY, Disp: 60 tablet, Rfl: 0  •  Multiple Vitamin (MULTI-VITAMIN PO), Take 1 tablet by mouth Daily., Disp: , Rfl:   •  OxyCODONE HCl (OXAYDO) 7.5 MG tablet , Take 7.5 mg by mouth Every 8 (Eight) Hours As Needed., Disp: , Rfl:   •  pantoprazole (PROTONIX) 40 MG EC tablet, Take 40 mg by mouth Daily., Disp: , Rfl:   •  pregabalin (LYRICA) 75 MG capsule, Take 1 capsule by mouth 2 (Two) Times a Day., Disp: 60 capsule, Rfl: 2  •  promethazine (PHENERGAN) 25 MG tablet, TAKE ONE TABLET BY MOUTH EVERY 6 HOURS AS NEEDED FOR NAUSEA OR VOMITING, Disp: 30 tablet, Rfl: 0  •  RELISTOR 150 MG tablet, 150 mg Daily., Disp: , Rfl:   •  rifaximin (XIFAXAN) 550 MG tablet, Take 550 mg by mouth Every 12 (Twelve) Hours., Disp: , Rfl:   •  spironolactone (ALDACTONE) 100 MG tablet, Take 1 tablet by mouth Daily., Disp: 30 tablet, Rfl: 0  •  sucralfate (CARAFATE) 1 g tablet, Take 1 g by mouth 2 (Two) Times a Day., Disp: , Rfl:   •  trimethobenzamide (TIGAN) 300 MG capsule, Take 300 mg by mouth 3 (Three) Times a Day., Disp: , Rfl:   •  vitamin B-12 (CYANOCOBALAMIN) 1000 MCG tablet, Take 1,000 mcg by mouth daily., Disp: , Rfl:   •  Insulin Regular Human, Conc, (HUMULIN R U-500 KWIKPEN) 500 UNIT/ML solution pen-injector CONCENTRATED injection, 25 units with each meal plus the sliding scale. Maximum of 50 units per meal., Disp: 5 pen, Rfl: 3        Review of Systems   Constitutional: Positive for fatigue. Negative for fever.   HENT: Positive for trouble swallowing. Negative for facial swelling,  "nosebleeds and voice change.    Eyes: Negative for pain and redness.   Respiratory: Positive for shortness of breath. Negative for wheezing.    Cardiovascular: Positive for palpitations. Negative for leg swelling.   Gastrointestinal: Positive for abdominal pain, diarrhea and vomiting.   Endocrine: Positive for polydipsia. Negative for polyuria.   Genitourinary: Negative for decreased urine volume and frequency.   Musculoskeletal: Positive for joint swelling. Negative for neck pain.   Skin: Negative for rash.   Allergic/Immunologic: Negative for immunocompromised state.   Neurological: Positive for dizziness, light-headedness, numbness and headaches. Negative for seizures and facial asymmetry.   Hematological: Bruises/bleeds easily.   Psychiatric/Behavioral: Positive for agitation and confusion.       Objective       Vitals:    04/05/18 1028   BP: 138/76   Pulse: 120   SpO2: 98%   Weight: 76.7 kg (169 lb)   Height: 168.9 cm (66.5\")     Body mass index is 26.87 kg/m².      Physical Exam   Gen exam - alert and oriented x 3, obese female, not in distress.   HEENT - Acanthosis nigricans. Thyroid palpable.   Resp - Clear to auscultation.   CVS - S1,S2 heard and no murmurs.   Abd - Non tender, BS heard.   Ext - No edema and intact pin prick and proprioception.     Results Review:     I reviewed the patient's new clinical results.    Medical records reviewed  Summary: done      Office Visit on 03/30/2018   Component Date Value Ref Range Status   • Color, UA 03/30/2018 Yellow  Yellow, Straw Final   • Appearance, UA 03/30/2018 Clear  Clear Final   • pH, UA 03/30/2018 5.5  5.0 - 8.0 Final   • Specific Gravity, UA 03/30/2018 >=1.030  1.005 - 1.030 Final   • Glucose, UA 03/30/2018 >=1000 mg/dL (3+)* Negative Final   • Ketones, UA 03/30/2018 40 mg/dL (2+)* Negative Final   • Bilirubin, UA 03/30/2018 Negative  Negative Final   • Blood, UA 03/30/2018 Small (1+)* Negative Final   • Protein, UA 03/30/2018 Negative  Negative Final   • " Leuk Esterase, UA 03/30/2018 Negative  Negative Final   • Nitrite, UA 03/30/2018 Negative  Negative Final   • Urobilinogen, UA 03/30/2018 1.0 E.U./dL  0.2 - 1.0 E.U./dL Final   • RBC, UA 03/30/2018 6-12* None Seen, 0-2 /HPF Final   • WBC, UA 03/30/2018 6-12* None Seen, 0-2 /HPF Final   • Bacteria, UA 03/30/2018 1+* None Seen /HPF Final   • Squamous Epithelial Cells, UA 03/30/2018 3-6* None Seen, 0-2 /HPF Final   • Hyaline Casts, UA 03/30/2018 3-6  None Seen /LPF Final   • Methodology 03/30/2018 Automated Microscopy   Final   • Urine Culture 04/01/2018 >100,000 CFU/mL Mixed Sonali Isolated   Final     Lab Results   Component Value Date    HGBA1C 11.20 (H) 12/16/2017    HGBA1C 6.86 (H) 04/25/2017    HGBA1C 5.80 (H) 03/07/2017     Lab Results   Component Value Date    MICROALBUR <1.2 12/17/2017    CREATININE 1.00 03/15/2018     Imaging Results (most recent)     None                Assessment and Plan:    Silvia was seen today for diabetes.    Diagnoses and all orders for this visit:    Gastroparesis    Type 2 diabetes mellitus, uncontrolled, with neuropathy    Diabetic peripheral neuropathy    Other orders  -     Insulin Regular Human, Conc, (HUMULIN R U-500 KWIKPEN) 500 UNIT/ML solution pen-injector CONCENTRATED injection; 25 units with each meal plus the sliding scale. Maximum of 50 units per meal.      Type 2 diabetes mellitus-uncontrolled  Discussed with the patient extensively about starting her on U-500 insulin  Will start patient on U-500 with 25 units with each meal  U-500 10 units with snacks.   Patient will follow U-500 insulin sliding scale with each meal and at snacks - high dose sliding scale 3 units for 50 about 1 50 mg/dL.    In the interim if U -500 insulin is expensive patient will do Toujeo  Toujeo 90 units in the morning and 100 units in the evening  NovoLog 55 units with each meal and 35 units with snacks  Will follow high dose sliding scale of NovoLog 3 times a day before meals and at  bedtime.    Explained to the patient preference is to start U-500 insulin but if  she cannot afford then we would continue to try to Toujeo and NovoLog.    Advised the patient if the blood sugars are not controlled in the next 1-2 days she needs to definitely go to the emergency department and patient voiced understanding.    First recommendation was however for her to go to the emergency department today but she wants to see the changed insulin regimen and then go to the ER.    Reviewed Lab results with the patient.       The total face to face time spent 25minutes with the patient,13minutes (greater than 50% of the total time) was spent counseling and coordination of the care on insulin regimen and treatment plan.

## 2018-04-09 RX ORDER — LEVETIRACETAM 500 MG/1
TABLET ORAL
Qty: 60 TABLET | Refills: 0 | Status: SHIPPED | OUTPATIENT
Start: 2018-04-09 | End: 2018-05-06 | Stop reason: SDUPTHER

## 2018-04-10 ENCOUNTER — TELEPHONE (OUTPATIENT)
Dept: OBSTETRICS AND GYNECOLOGY | Age: 58
End: 2018-04-10

## 2018-04-10 NOTE — TELEPHONE ENCOUNTER
That's fine.       ----- Message -----   From: Sheila Brody   Sent: 3/30/2018   9:20 AM   To: Sujata Salas MD   Subject: Referral que Patient                               Referral attached      Insurance- [place] [employer]

## 2018-04-10 NOTE — TELEPHONE ENCOUNTER
That's fine.       ----- Message -----   From: Sheila Brody   Sent: 3/30/2018   9:20 AM   To: Sujata Salas MD   Subject: Referral que Patient                               Referral attached      Insurance- [place] [employer]      Pt is schedule 6-14-18.

## 2018-04-11 ENCOUNTER — HOSPITAL ENCOUNTER (OUTPATIENT)
Dept: ULTRASOUND IMAGING | Facility: HOSPITAL | Age: 58
Discharge: HOME | End: 2018-04-11
Attending: INTERNAL MEDICINE | Admitting: INTERNAL MEDICINE

## 2018-04-11 DIAGNOSIS — R10.9 FLANK PAIN: ICD-10-CM

## 2018-04-11 PROCEDURE — 76775 US EXAM ABDO BACK WALL LIM: CPT

## 2018-04-12 RX ORDER — PANTOPRAZOLE SODIUM 40 MG/1
TABLET, DELAYED RELEASE ORAL
Qty: 30 TABLET | Refills: 4 | Status: SHIPPED | OUTPATIENT
Start: 2018-04-12 | End: 2018-09-15 | Stop reason: SDUPTHER

## 2018-04-16 RX ORDER — FERROUS SULFATE 325(65) MG
TABLET ORAL
Qty: 60 TABLET | Refills: 1 | Status: SHIPPED | OUTPATIENT
Start: 2018-04-16 | End: 2018-06-14 | Stop reason: SDUPTHER

## 2018-04-16 RX ORDER — RIFAXIMIN 550 MG/1
TABLET ORAL
Qty: 60 TABLET | Refills: 4 | Status: SHIPPED | OUTPATIENT
Start: 2018-04-16 | End: 2018-09-15 | Stop reason: SDUPTHER

## 2018-04-17 ENCOUNTER — PRIOR AUTHORIZATION (OUTPATIENT)
Dept: GASTROENTEROLOGY | Facility: CLINIC | Age: 58
End: 2018-04-17

## 2018-04-17 RX ORDER — BUPROPION HYDROCHLORIDE 150 MG/1
TABLET ORAL
Qty: 90 TABLET | Refills: 3 | Status: SHIPPED | OUTPATIENT
Start: 2018-04-17 | End: 2018-10-11 | Stop reason: HOSPADM

## 2018-04-17 NOTE — TELEPHONE ENCOUNTER
"Received fax from [facility], Xifaxan needs re-auth.  Submitted request via \"covermymeds\".  Approval pending.    4/19/18, Received fax from [facility] Prescription Plan, Xifaxan approved from 4/16/18-4/15/19.  [facility].  "

## 2018-04-18 ENCOUNTER — TELEPHONE (OUTPATIENT)
Dept: ENDOCRINOLOGY | Age: 58
End: 2018-04-18

## 2018-04-18 ENCOUNTER — TELEPHONE (OUTPATIENT)
Dept: INTERNAL MEDICINE | Facility: CLINIC | Age: 58
End: 2018-04-18

## 2018-04-18 RX ORDER — SPIRONOLACTONE 100 MG/1
TABLET, FILM COATED ORAL
Qty: 30 TABLET | Refills: 4 | Status: SHIPPED | OUTPATIENT
Start: 2018-04-18 | End: 2018-09-15 | Stop reason: SDUPTHER

## 2018-04-18 NOTE — TELEPHONE ENCOUNTER
PATIENT CALLED THE OFFICE STATING THE HER BLOOD SUGAR WHERE STILL GETTING IN -500 AND SHE ALSO HAVING LOWS. PATIENT STATED THAT SHE IS DOING HUMILIN U 500  25 UNITS WITH EACH MEAL AND 10 UNTIS WITH SNACKS PATIENT WAS NOT SURE IF SHE NEEDED TO TAKE THE LONG ACTING INSULIN  SHE HAS TOUJEO.       PRE DR WALKER                     LET DO THE HUMILIN U 500 30 UNITS WITH EACH MEAL AND 15 UNITS WITH SNACK ASK   HER TO DO NOT DO THE TOUJEO IF SUGAR ARE STILL RUNNING HIGH AFTER 1 WEEK YOU NEED TO MAKE AN APPOINMTENT IN 2-3 WEEKS      CALLED AND SPOKE WITH PATIENT ABOUT THE INSULIN CHANGE PATIENT VOICE UNDERSTANDING

## 2018-04-18 NOTE — TELEPHONE ENCOUNTER
----- Message from Rochelle Crowley sent at 4/18/2018  1:02 PM EDT -----  Contact: pt - Dr Pitts' pt - RE: US results  Pt calling and would like results of US done 04/11/2018. Results are in pt's chart. Could you please call pt to inform of results? Please advise. Thanks        Pt # [phone]

## 2018-04-25 ENCOUNTER — HOSPITAL ENCOUNTER (EMERGENCY)
Facility: HOSPITAL | Age: 58
Discharge: HOME | End: 2018-04-25
Attending: EMERGENCY MEDICINE | Admitting: EMERGENCY MEDICINE

## 2018-04-25 VITALS
RESPIRATION RATE: 16 BRPM | TEMPERATURE: 96.8 F | OXYGEN SATURATION: 100 % | WEIGHT: 170 LBS | SYSTOLIC BLOOD PRESSURE: 136 MMHG | HEIGHT: 66 IN | DIASTOLIC BLOOD PRESSURE: 76 MMHG | BODY MASS INDEX: 27.32 KG/M2 | HEART RATE: 91 BPM

## 2018-04-25 DIAGNOSIS — R73.9 HYPERGLYCEMIA: Primary | ICD-10-CM

## 2018-04-25 LAB
ALBUMIN SERPL-MCNC: 3.8 G/DL (ref 3.5–5.2)
ALBUMIN/GLOB SERPL: 1 G/DL
ALP SERPL-CCNC: 161 U/L (ref 39–117)
ALT SERPL W P-5'-P-CCNC: 46 U/L (ref 1–33)
ANION GAP SERPL CALCULATED.3IONS-SCNC: 10.8 MMOL/L
ANION GAP SERPL CALCULATED.3IONS-SCNC: 14.2 MMOL/L
AST SERPL-CCNC: 59 U/L (ref 1–32)
BASOPHILS # BLD AUTO: 0.02 10*3/MM3 (ref 0–0.2)
BASOPHILS NFR BLD AUTO: 0.5 % (ref 0–1.5)
BILIRUB SERPL-MCNC: 0.7 MG/DL (ref 0.1–1.2)
BUN BLD-MCNC: 5 MG/DL (ref 6–20)
BUN BLD-MCNC: 6 MG/DL (ref 6–20)
BUN/CREAT SERPL: 7.5 (ref 7–25)
BUN/CREAT SERPL: 7.7 (ref 7–25)
CALCIUM SPEC-SCNC: 8.4 MG/DL (ref 8.6–10.5)
CALCIUM SPEC-SCNC: 9 MG/DL (ref 8.6–10.5)
CHLORIDE SERPL-SCNC: 105 MMOL/L (ref 98–107)
CHLORIDE SERPL-SCNC: 95 MMOL/L (ref 98–107)
CO2 SERPL-SCNC: 24.2 MMOL/L (ref 22–29)
CO2 SERPL-SCNC: 24.8 MMOL/L (ref 22–29)
CREAT BLD-MCNC: 0.65 MG/DL (ref 0.57–1)
CREAT BLD-MCNC: 0.8 MG/DL (ref 0.57–1)
DEPRECATED RDW RBC AUTO: 47.9 FL (ref 37–54)
EOSINOPHIL # BLD AUTO: 0.07 10*3/MM3 (ref 0–0.7)
EOSINOPHIL NFR BLD AUTO: 1.8 % (ref 0.3–6.2)
ERYTHROCYTE [DISTWIDTH] IN BLOOD BY AUTOMATED COUNT: 15.2 % (ref 11.7–13)
GFR SERPL CREATININE-BSD FRML MDRD: 74 ML/MIN/1.73
GFR SERPL CREATININE-BSD FRML MDRD: 95 ML/MIN/1.73
GLOBULIN UR ELPH-MCNC: 3.7 GM/DL
GLUCOSE BLD-MCNC: 284 MG/DL (ref 65–99)
GLUCOSE BLD-MCNC: 562 MG/DL (ref 65–99)
GLUCOSE BLDC GLUCOMTR-MCNC: 427 MG/DL (ref 70–130)
GLUCOSE BLDC GLUCOMTR-MCNC: >599 MG/DL (ref 70–130)
HCT VFR BLD AUTO: 37.1 % (ref 35.6–45.5)
HGB BLD-MCNC: 11.8 G/DL (ref 11.9–15.5)
IMM GRANULOCYTES # BLD: 0 10*3/MM3 (ref 0–0.03)
IMM GRANULOCYTES NFR BLD: 0 % (ref 0–0.5)
LYMPHOCYTES # BLD AUTO: 0.62 10*3/MM3 (ref 0.9–4.8)
LYMPHOCYTES NFR BLD AUTO: 16.3 % (ref 19.6–45.3)
MCH RBC QN AUTO: 27.4 PG (ref 26.9–32)
MCHC RBC AUTO-ENTMCNC: 31.8 G/DL (ref 32.4–36.3)
MCV RBC AUTO: 86.3 FL (ref 80.5–98.2)
MONOCYTES # BLD AUTO: 0.23 10*3/MM3 (ref 0.2–1.2)
MONOCYTES NFR BLD AUTO: 6.1 % (ref 5–12)
NEUTROPHILS # BLD AUTO: 2.86 10*3/MM3 (ref 1.9–8.1)
NEUTROPHILS NFR BLD AUTO: 75.3 % (ref 42.7–76)
PLATELET # BLD AUTO: 101 10*3/MM3 (ref 140–500)
PMV BLD AUTO: 10.5 FL (ref 6–12)
POTASSIUM BLD-SCNC: 3.2 MMOL/L (ref 3.5–5.2)
POTASSIUM BLD-SCNC: 3.7 MMOL/L (ref 3.5–5.2)
PROT SERPL-MCNC: 7.5 G/DL (ref 6–8.5)
RBC # BLD AUTO: 4.3 10*6/MM3 (ref 3.9–5.2)
SODIUM BLD-SCNC: 134 MMOL/L (ref 136–145)
SODIUM BLD-SCNC: 140 MMOL/L (ref 136–145)
WBC NRBC COR # BLD: 3.8 10*3/MM3 (ref 4.5–10.7)

## 2018-04-25 PROCEDURE — 82962 GLUCOSE BLOOD TEST: CPT

## 2018-04-25 PROCEDURE — 85025 COMPLETE CBC W/AUTO DIFF WBC: CPT | Performed by: PHYSICIAN ASSISTANT

## 2018-04-25 PROCEDURE — 99284 EMERGENCY DEPT VISIT MOD MDM: CPT

## 2018-04-25 PROCEDURE — 63710000001 INSULIN REGULAR HUMAN PER 5 UNITS: Performed by: PHYSICIAN ASSISTANT

## 2018-04-25 PROCEDURE — 80053 COMPREHEN METABOLIC PANEL: CPT | Performed by: PHYSICIAN ASSISTANT

## 2018-04-25 PROCEDURE — 80048 BASIC METABOLIC PNL TOTAL CA: CPT | Performed by: PHYSICIAN ASSISTANT

## 2018-04-25 PROCEDURE — 96360 HYDRATION IV INFUSION INIT: CPT

## 2018-04-25 PROCEDURE — 96361 HYDRATE IV INFUSION ADD-ON: CPT

## 2018-04-25 RX ORDER — SODIUM CHLORIDE 0.9 % (FLUSH) 0.9 %
10 SYRINGE (ML) INJECTION AS NEEDED
Status: DISCONTINUED | OUTPATIENT
Start: 2018-04-25 | End: 2018-04-25 | Stop reason: HOSPADM

## 2018-04-25 RX ORDER — POTASSIUM CHLORIDE 750 MG/1
40 CAPSULE, EXTENDED RELEASE ORAL ONCE
Status: COMPLETED | OUTPATIENT
Start: 2018-04-25 | End: 2018-04-25

## 2018-04-25 RX ADMIN — POTASSIUM CHLORIDE 40 MEQ: 750 CAPSULE, EXTENDED RELEASE ORAL at 20:00

## 2018-04-25 RX ADMIN — SODIUM CHLORIDE 1000 ML: 9 INJECTION, SOLUTION INTRAVENOUS at 17:35

## 2018-04-25 RX ADMIN — HUMAN INSULIN 10 UNITS: 100 INJECTION, SOLUTION SUBCUTANEOUS at 17:29

## 2018-04-25 RX ADMIN — HUMAN INSULIN 10 UNITS: 100 INJECTION, SOLUTION SUBCUTANEOUS at 20:00

## 2018-04-25 RX ADMIN — SODIUM CHLORIDE 1000 ML: 9 INJECTION, SOLUTION INTRAVENOUS at 19:59

## 2018-04-25 NOTE — ED TRIAGE NOTES
Pt reports generalized abd pain and hyperglycemia for the last few days. Pt's blood suggar is too high for the monitor to read. Pt complains of N/Vas well. Pt appears to be in pain at this time.

## 2018-04-25 NOTE — ED PROVIDER NOTES
"EMERGENCY DEPARTMENT ENCOUNTER    Room Number:  34/34  Date seen:  4/25/2018  Time seen: 5:07 PM  PCP: Blanca Pitts MD  Historian: Patient, Family      HPI:  Chief complaint: Hyperglycemia  Context: Silvia Zabala is a 57 y.o. female with h/o gastroparesis. Pt also has h/o diabetes for which pt is on insulin. Pt states that in 01/2018, pt was on Novolog and Lantus to treat for the diabetes; however, pt is no longer on them. Pt reports that since 01/2018, pt has had multiple changes to her diabetes medication regimen. Pt states that she is currently on Humulin to treat for her diabetes. Pt presents to the ED c/o hyperglycemia onset several days ago. Pt states that she has been compliant with her current diabetes regimen, but reports that she has had difficulty controlling her blood glucose recently. Pt reports that earlier today, pt checked her blood glucose and the glucometer read her blood glucose as \"too high\". Pt states that she has also had generalized abdominal pain (which pt states is chronic from her gastroparesis), polydipsia, and N/V/D. Pt denies chest pain, dyspnea, and palpitations. There are no other complaints at this time.     Onset Quality: Gradual in onset  Location: Endocrinological   Radiation: N/A  Duration: Onset several days ago  Timing: Constant  Character: \"elevated blood sugar\"   Aggravating Factors: Nothing  Alleviating Factors: Nothing  Severity: Moderate         MEDICAL RECORD REVIEW    Pt was admitted 12/16/17-12/20/17 secondary to DKA.          ALLERGIES  Albuterol; Lactulose; Tramadol; and Quinine derivatives    PAST MEDICAL HISTORY  Active Ambulatory Problems     Diagnosis Date Noted   • Cirrhosis of liver 03/08/2016   • Rheumatoid arthritis 03/08/2016   • Anxiety and depression 03/08/2016   • Type 2 diabetes mellitus, uncontrolled, with neuropathy 03/15/2016   • Fibrositis 03/15/2016   • Change in blood platelet count 03/15/2016   • Pancytopenia 04/12/2016   • Hypersplenism " 04/12/2016   • Nausea 06/14/2016   • DUKES (nonalcoholic steatohepatitis) 06/14/2016   • Hyperlipidemia    • UGI bleed 02/15/2017   • Ascites 02/21/2017   • Portal hypertension 02/22/2017   • Systemic lupus 02/22/2017   • Secondary esophageal varices without bleeding 02/22/2017   • Secondary esophageal varices with bleeding 03/07/2017   • Gastroparesis 10/17/2017   • Cirrhosis of liver without ascites 11/17/2017   • Diabetic ketoacidosis without coma associated with type 2 diabetes mellitus 12/16/2017   • Diabetic peripheral neuropathy 12/17/2017   • History of seizure disorder 12/17/2017     Resolved Ambulatory Problems     Diagnosis Date Noted   • Generalized abdominal pain 02/14/2017   • Upper GI bleed 04/25/2017     Past Medical History:   Diagnosis Date   • Anemia    • Anxiety    • Arthritis    • Chromosomal abnormality    • Cirrhosis    • Colon polyps    • Deafness    • Depression    • Diabetes mellitus    • Duodenal ulcer with hemorrhage    • Esophageal varices determined by endoscopy    • Fibromyalgia    • Gallbladder disease    • Gastroparesis    • Glaucoma    • Granuloma annulare    • H/O B12 deficiency    • H/O Bleeding ulcer    • H/O mixed connective tissue disorder    • Hemorrhoids    • Hiatal hernia    • History of transfusion    • Hx of being hospitalized    • Hyperlipidemia    • Migraine    • DUKES (nonalcoholic steatohepatitis) 11/2016   • BRICE (obstructive sleep apnea)    • Pancreas divisum    • Pancytopenia    • Peptic ulcer disease    • PONV (postoperative nausea and vomiting)    • RA (rheumatoid arthritis)    • Seizure disorder    • Seizures    • Systemic lupus        PAST SURGICAL HISTORY  Past Surgical History:   Procedure Laterality Date   • BLADDER REPAIR  1991   • CATARACT EXTRACTION     • CHOLECYSTECTOMY  1994   • ENDOSCOPY N/A 11/7/2016    Procedure: ESOPHAGOGASTRODUODENOSCOPY WITH COLD POLYPECTOMY, BANDING,  AND CLIPS TIMES 2;  Surgeon: Rich Coleman MD;  Location: [facility] ENDOSCOPY;   "Service:    • ENDOSCOPY N/A 12/22/2016    Procedure: ESOPHAGOGASTRODUODENOSCOPY WITH ESOPHAGEAL BANDING. 5 BANDS FIRED, 4 BANDS ADHERERD TO MUCOSA;  Surgeon: Rich Coleman MD;  Location: [facility] ENDOSCOPY;  Service:    • ENDOSCOPY N/A 2/15/2017    Procedure: ESOPHAGOGASTRODUODENOSCOPY AT BEDSIDE with esophageal varices banding (3);  Surgeon: Rich Coleman MD;  Location[facility] ENDOSCOPY;  Service:    • ENDOSCOPY N/A 4/6/2017    Procedure: ESOPHAGOGASTRODUODENOSCOPY WITH ESOPHAGEAL VARICES BANDING x2;  Surgeon: Rich Coleman MD;  Location[facility] ENDOSCOPY;  Service:    • ENDOSCOPY N/A 11/24/2017    Procedure: ESOPHAGOGASTRODUODENOSCOPY with biopsies;  Surgeon: Rich Coleman MD;  Location: [facility] ENDOSCOPY;  Service:    • ENDOSCOPY AND COLONOSCOPY  2014    Dr. Rich Coleman with findings of candida esophagitis   • EYE SURGERY     • HYSTERECTOMY  1986    partial   • JOINT REPLACEMENT     • KNEE SURGERY Right 1995    \"right knee recontstruction\"   • LIVER BIOPSY  01/2015   • MASS EXCISION     • STOMACH SURGERY         FAMILY HISTORY  Family History   Problem Relation Age of Onset   • Liver disease Other    • Depression Other    • Heart disease Other    • Hypertension Other    • Diabetes Other    • Breast cancer Other    • Brain cancer Other    • Uterine cancer Other    • Colon cancer Other    • Leukemia Other    • Colon cancer Maternal Aunt    • Hypertension Mother    • Diabetes type II Mother    • Rheum arthritis Mother    • Liver disease Mother      DUKES   • Heart disease Father    • Diabetes type II Father    • Diabetes type II Sister    • Lupus Sister    • Malig Hyperthermia Neg Hx        SOCIAL HISTORY  Social History     Social History   • Marital status:      Spouse name: Jose   • Number of children: N/A   • Years of education: N/A     Occupational History   •  Disabled     Social History Main Topics   • Smoking status: Former Smoker     Packs/day: 0.00     Years: 0.00     Quit date: " 12/17/2015   • Smokeless tobacco: Never Used   • Alcohol use No   • Drug use: No   • Sexual activity: Defer     Other Topics Concern   • Not on file     Social History Narrative    Moved to [place] from [place]. She is currently medically disabled.           REVIEW OF SYSTEMS  Review of Systems   Constitutional: Negative for chills.   HENT: Negative for congestion, rhinorrhea and sore throat.    Eyes: Negative for pain.   Respiratory: Negative for cough and shortness of breath.    Cardiovascular: Negative for chest pain, palpitations and leg swelling.   Gastrointestinal: Positive for abdominal pain (pt reports that this is chronic), diarrhea, nausea and vomiting.   Endocrine: Positive for polydipsia.   Genitourinary: Negative for difficulty urinating, dysuria, flank pain and frequency.   Musculoskeletal: Negative for myalgias, neck pain and neck stiffness.   Skin: Negative for rash.   Neurological: Negative for dizziness, speech difficulty, weakness, light-headedness, numbness and headaches.   Psychiatric/Behavioral: Negative.    All other systems reviewed and are negative.          PHYSICAL EXAM  ED Triage Vitals   Temp Heart Rate Resp BP SpO2   04/25/18 1638 04/25/18 1638 04/25/18 1638 04/25/18 1640 04/25/18 1638   96.8 °F (36 °C) 119 18 132/80 100 %      Temp src Heart Rate Source Patient Position BP Location FiO2 (%)   04/25/18 1638 04/25/18 1638 04/25/18 1640 -- --   Tympanic Monitor Sitting       Physical Exam   Constitutional: She is oriented to person, place, and time. No distress.   HENT:   Head: Normocephalic.   Mouth/Throat: Mucous membranes are normal.   Eyes: EOM are normal. Pupils are equal, round, and reactive to light.   Neck: Normal range of motion. Neck supple.   Cardiovascular: Regular rhythm and normal heart sounds.  Tachycardia present.    Pulmonary/Chest: Effort normal and breath sounds normal. No respiratory distress. She has no decreased breath sounds. She has no wheezes. She has no  rhonchi. She has no rales.   Abdominal: Soft. There is no tenderness. There is no rebound and no guarding.   Musculoskeletal: Normal range of motion.   Neurological: She is alert and oriented to person, place, and time. She has normal sensation.   Skin: Skin is warm and dry.   Psychiatric: Mood and affect normal.   Nursing note and vitals reviewed.          LAB RESULTS  Recent Results (from the past 24 hour(s))   POC Glucose Once    Collection Time: 04/25/18  4:42 PM   Result Value Ref Range    Glucose >599 (C) 70 - 130 mg/dL   Comprehensive Metabolic Panel    Collection Time: 04/25/18  5:25 PM   Result Value Ref Range    Glucose 562 (C) 65 - 99 mg/dL    BUN 6 6 - 20 mg/dL    Creatinine 0.80 0.57 - 1.00 mg/dL    Sodium 134 (L) 136 - 145 mmol/L    Potassium 3.7 3.5 - 5.2 mmol/L    Chloride 95 (L) 98 - 107 mmol/L    CO2 24.8 22.0 - 29.0 mmol/L    Calcium 9.0 8.6 - 10.5 mg/dL    Total Protein 7.5 6.0 - 8.5 g/dL    Albumin 3.80 3.50 - 5.20 g/dL    ALT (SGPT) 46 (H) 1 - 33 U/L    AST (SGOT) 59 (H) 1 - 32 U/L    Alkaline Phosphatase 161 (H) 39 - 117 U/L    Total Bilirubin 0.7 0.1 - 1.2 mg/dL    eGFR Non African Amer 74 >60 mL/min/1.73    Globulin 3.7 gm/dL    A/G Ratio 1.0 g/dL    BUN/Creatinine Ratio 7.5 7.0 - 25.0    Anion Gap 14.2 mmol/L   CBC Auto Differential    Collection Time: 04/25/18  5:25 PM   Result Value Ref Range    WBC 3.80 (L) 4.50 - 10.70 10*3/mm3    RBC 4.30 3.90 - 5.20 10*6/mm3    Hemoglobin 11.8 (L) 11.9 - 15.5 g/dL    Hematocrit 37.1 35.6 - 45.5 %    MCV 86.3 80.5 - 98.2 fL    MCH 27.4 26.9 - 32.0 pg    MCHC 31.8 (L) 32.4 - 36.3 g/dL    RDW 15.2 (H) 11.7 - 13.0 %    RDW-SD 47.9 37.0 - 54.0 fl    MPV 10.5 6.0 - 12.0 fL    Platelets 101 (L) 140 - 500 10*3/mm3    Neutrophil % 75.3 42.7 - 76.0 %    Lymphocyte % 16.3 (L) 19.6 - 45.3 %    Monocyte % 6.1 5.0 - 12.0 %    Eosinophil % 1.8 0.3 - 6.2 %    Basophil % 0.5 0.0 - 1.5 %    Immature Grans % 0.0 0.0 - 0.5 %    Neutrophils, Absolute 2.86 1.90 - 8.10  "10*3/mm3    Lymphocytes, Absolute 0.62 (L) 0.90 - 4.80 10*3/mm3    Monocytes, Absolute 0.23 0.20 - 1.20 10*3/mm3    Eosinophils, Absolute 0.07 0.00 - 0.70 10*3/mm3    Basophils, Absolute 0.02 0.00 - 0.20 10*3/mm3    Immature Grans, Absolute 0.00 0.00 - 0.03 10*3/mm3   POC Glucose Once    Collection Time: 04/25/18  7:31 PM   Result Value Ref Range    Glucose 427 (H) 70 - 130 mg/dL       I ordered the above labs and reviewed the results        MEDICATIONS GIVEN IN ER  Medications   sodium chloride 0.9 % flush 10 mL (not administered)   sodium chloride 0.9 % bolus 1,000 mL (1,000 mL Intravenous New Bag 4/25/18 1959)   sodium chloride 0.9 % bolus 1,000 mL (0 mL Intravenous Stopped 4/25/18 1954)   insulin regular (humuLIN R,novoLIN R) injection 10 Units (10 Units Subcutaneous Given 4/25/18 1729)   insulin regular (humuLIN R,novoLIN R) injection 10 Units (10 Units Subcutaneous Given 4/25/18 2000)   potassium chloride (MICRO-K) CR capsule 40 mEq (40 mEq Oral Given 4/25/18 2000)           PROCEDURES  Procedures          PROGRESS AND CONSULTS    Progress Notes:    ED Course   Value Comment By Time   Creatinine: 0.80 (Reviewed) Angie Domingo 04/25 1855     5:19 PM:  Pt's blood glucose is currently >599 - insulin and IV fluids ordered.     6:50 PM:  Reviewed pt's history and workup with Dr. Galindo (ER physician).  After a bedside evaluation, Dr. Galindo agrees with the plan of care.    7:47 PM:  Pt's repeat blood glucose is 427 - IV fluids and insulin ordered. Pt's K+ is 3.7 -> KCl PO ordered. BNP ordered for further evaluation.     8:07 PM:  Pt is pending decrease in blood glucose. Course of care was turned over to Mr. Mitchell PA-C. Anticipate that pt will be discharged. Pt will be referred to her endocrinologist for close f/u.         Disposition vitals:  /74   Pulse 119   Temp 96.8 °F (36 °C) (Tympanic)   Resp 18   Ht 167.6 cm (66\")   Wt 77.1 kg (170 lb)   SpO2 99%   BMI 27.44 kg/m²         DIAGNOSIS  Final " diagnoses:   Hyperglycemia           DISPOSITION  Pt discharged.    DISCHARGE    Patient discharged in stable condition.    Reviewed implications of results, diagnosis, meds, responsibility to follow up, warning signs and symptoms of possible worsening, potential complications and reasons to return to ER.    Patient/Family voiced understanding of above instructions.    Discussed plan for discharge, as there is no emergent indication for admission. Pt/family is agreeable and understands need for follow up and repeat testing. Pt is aware that discharge does not mean that nothing is wrong but it indicates no emergency is present that requires admission and they must continue care with follow-up as given below or physician of their choice.     FOLLOW-UP  Merary Burnett MD  [address]  [facility] [address] [phone]    Schedule an appointment as soon as possible for a visit      Blanca Pitts MD  [address]  [facility] [id number] [phone]      As needed            Documentation assistance provided by tori Lucio for Mr. Reynaldo Albright PA-C.  Information recorded by the sirishae was done at my direction and has been verified and validated by me.             Arnoldo Lucio  04/25/18 2012       ARIAS Morris  04/25/18 7068

## 2018-04-25 NOTE — ED PROVIDER NOTES
MD ATTESTATION NOTE    The AYESHA and I have discussed this patient's history, physical exam, and treatment plan.  I have reviewed the documentation and personally had a face to face interaction with the patient. I affirm the documentation and agree with the treatment and plan.  The attached note describes my personal findings.      Pt, with a h/x of DM, presents with hyperglycemia that began several days ago. She checked her glucose this morning and it was out of range of the glucometer so she came to the ED. Pt states that she has not been eating anything unusual over the past few days. Her PCP is working on adjusting her insulin levels and medications to manage her DM effectively. Pt denies missing any doses of her medication. Informed Pt that we do not want her to go into DKA, so we will monitor her for the next few hours until her blood sugars return to appropriate ranges.   Upon physical exam, Pt's heart is RRR and her lungs are CTAB. Her abdomen is benign.      Angie Domingo  04/25/18 1924       Masood Galindo MD  04/26/18 0144

## 2018-04-26 ENCOUNTER — TELEPHONE (OUTPATIENT)
Dept: SOCIAL WORK | Facility: HOSPITAL | Age: 58
End: 2018-04-26

## 2018-04-26 ENCOUNTER — TELEPHONE (OUTPATIENT)
Dept: ENDOCRINOLOGY | Age: 58
End: 2018-04-26

## 2018-04-26 NOTE — ED PROVIDER NOTES
EMERGENCY DEPARTMENT ENCOUNTER    Room number:  34/34  Date Seen:  4/25/2018  Time of transfer:2000  PCP:  Blanca Pitts MD    Laboratory Results:  Recent Results (from the past 24 hour(s))   POC Glucose Once    Collection Time: 04/25/18  4:42 PM   Result Value Ref Range    Glucose >599 (C) 70 - 130 mg/dL   Comprehensive Metabolic Panel    Collection Time: 04/25/18  5:25 PM   Result Value Ref Range    Glucose 562 (C) 65 - 99 mg/dL    BUN 6 6 - 20 mg/dL    Creatinine 0.80 0.57 - 1.00 mg/dL    Sodium 134 (L) 136 - 145 mmol/L    Potassium 3.7 3.5 - 5.2 mmol/L    Chloride 95 (L) 98 - 107 mmol/L    CO2 24.8 22.0 - 29.0 mmol/L    Calcium 9.0 8.6 - 10.5 mg/dL    Total Protein 7.5 6.0 - 8.5 g/dL    Albumin 3.80 3.50 - 5.20 g/dL    ALT (SGPT) 46 (H) 1 - 33 U/L    AST (SGOT) 59 (H) 1 - 32 U/L    Alkaline Phosphatase 161 (H) 39 - 117 U/L    Total Bilirubin 0.7 0.1 - 1.2 mg/dL    eGFR Non African Amer 74 >60 mL/min/1.73    Globulin 3.7 gm/dL    A/G Ratio 1.0 g/dL    BUN/Creatinine Ratio 7.5 7.0 - 25.0    Anion Gap 14.2 mmol/L   CBC Auto Differential    Collection Time: 04/25/18  5:25 PM   Result Value Ref Range    WBC 3.80 (L) 4.50 - 10.70 10*3/mm3    RBC 4.30 3.90 - 5.20 10*6/mm3    Hemoglobin 11.8 (L) 11.9 - 15.5 g/dL    Hematocrit 37.1 35.6 - 45.5 %    MCV 86.3 80.5 - 98.2 fL    MCH 27.4 26.9 - 32.0 pg    MCHC 31.8 (L) 32.4 - 36.3 g/dL    RDW 15.2 (H) 11.7 - 13.0 %    RDW-SD 47.9 37.0 - 54.0 fl    MPV 10.5 6.0 - 12.0 fL    Platelets 101 (L) 140 - 500 10*3/mm3    Neutrophil % 75.3 42.7 - 76.0 %    Lymphocyte % 16.3 (L) 19.6 - 45.3 %    Monocyte % 6.1 5.0 - 12.0 %    Eosinophil % 1.8 0.3 - 6.2 %    Basophil % 0.5 0.0 - 1.5 %    Immature Grans % 0.0 0.0 - 0.5 %    Neutrophils, Absolute 2.86 1.90 - 8.10 10*3/mm3    Lymphocytes, Absolute 0.62 (L) 0.90 - 4.80 10*3/mm3    Monocytes, Absolute 0.23 0.20 - 1.20 10*3/mm3    Eosinophils, Absolute 0.07 0.00 - 0.70 10*3/mm3    Basophils, Absolute 0.02 0.00 - 0.20 10*3/mm3    Immature  Grans, Absolute 0.00 0.00 - 0.03 10*3/mm3   POC Glucose Once    Collection Time: 04/25/18  7:31 PM   Result Value Ref Range    Glucose 427 (H) 70 - 130 mg/dL   Basic Metabolic Panel    Collection Time: 04/25/18  9:02 PM   Result Value Ref Range    Glucose 284 (H) 65 - 99 mg/dL    BUN 5 (L) 6 - 20 mg/dL    Creatinine 0.65 0.57 - 1.00 mg/dL    Sodium 140 136 - 145 mmol/L    Potassium 3.2 (L) 3.5 - 5.2 mmol/L    Chloride 105 98 - 107 mmol/L    CO2 24.2 22.0 - 29.0 mmol/L    Calcium 8.4 (L) 8.6 - 10.5 mg/dL    eGFR Non African Amer 95 >60 mL/min/1.73    BUN/Creatinine Ratio 7.7 7.0 - 25.0    Anion Gap 10.8 mmol/L     I reviewed the above results.    Radiology:  No orders to display     I reviewed the above results    Medications ordered in ED:  Medications   sodium chloride 0.9 % flush 10 mL (not administered)   sodium chloride 0.9 % bolus 1,000 mL (0 mL Intravenous Stopped 4/25/18 1954)   insulin regular (humuLIN R,novoLIN R) injection 10 Units (10 Units Subcutaneous Given 4/25/18 1729)   sodium chloride 0.9 % bolus 1,000 mL (0 mL Intravenous Stopped 4/25/18 2057)   insulin regular (humuLIN R,novoLIN R) injection 10 Units (10 Units Subcutaneous Given 4/25/18 2000)   potassium chloride (MICRO-K) CR capsule 40 mEq (40 mEq Oral Given 4/25/18 2000)       Progress and Consult Notes:  2000  Patient care transferred from Reynaldo Albright PA-C pending labs.    2144  Reviewed pt's labs which show improved glucose. Pt will be d/c home.    Diagnosis:  Final diagnoses:   Hyperglycemia       Follow Up:  Merary Burnett MD  [address]  [facility] [address] [phone]    Schedule an appointment as soon as possible for a visit      Blanca Pitts MD  [address]  [facility] [address] [phone]      As needed      RX:     Medication List      No changes were made to your prescriptions during this visit.       Provider attestation:  I personally reviewed the past medical history, past surgical history,  social history, family history, current medications, and allergies as they appear in the chart.    The patient was seen and examined by myself and Dr. Galindo, who agree with plan.      Dinah Blackmon  04/25/18 2737       ARIAS Card  04/25/18 5841

## 2018-04-26 NOTE — TELEPHONE ENCOUNTER
Patient was seen in the ER        ----- Message from Amanda Whitfield sent at 4/25/2018  3:35 PM EDT -----  Contact: PATIENT  I SPOKE WITH THE PATIENT, AND CAME LOOKING FOR YOU, HER BLOOD SUGAR WAS TO HIGH TO READ, I FOUND JOHNATHAN AND SHE ADVISED ME TO TELL THE PATIENT TO GO TO THE Er. I JUST WANTED TO KEEP YOU INFORMED.

## 2018-04-26 NOTE — TELEPHONE ENCOUNTER
ED f/u phone call: states she feels better, BG is in the 200's today after dropping to 70 this am and she has a call into endocrinologist's office for f/u henok't. No questions/concerns

## 2018-04-26 NOTE — ED NOTES
Patient ambulatory to restroom. 2nd liter of NS complete. BMP to be redrawn.     Amanda Amaya, RN  04/25/18 9964

## 2018-04-30 ENCOUNTER — TELEPHONE (OUTPATIENT)
Dept: ENDOCRINOLOGY | Age: 58
End: 2018-04-30

## 2018-04-30 NOTE — TELEPHONE ENCOUNTER
Called and spoke with patient patient stated she is feeling better since she was in the ER she stated that she is having some low bs at night but some have been ok and she had some highs in the 300. But she is feeling better just still having stomach issues

## 2018-05-07 RX ORDER — LEVETIRACETAM 500 MG/1
TABLET ORAL
Qty: 60 TABLET | Refills: 1 | Status: SHIPPED | OUTPATIENT
Start: 2018-05-07 | End: 2018-07-04 | Stop reason: SDUPTHER

## 2018-05-08 ENCOUNTER — OFFICE VISIT (OUTPATIENT)
Dept: GASTROENTEROLOGY | Facility: CLINIC | Age: 58
End: 2018-05-08

## 2018-05-08 VITALS
HEART RATE: 104 BPM | DIASTOLIC BLOOD PRESSURE: 78 MMHG | BODY MASS INDEX: 28.45 KG/M2 | HEIGHT: 66 IN | SYSTOLIC BLOOD PRESSURE: 146 MMHG | WEIGHT: 177 LBS

## 2018-05-08 DIAGNOSIS — R79.9 ABNORMAL FINDING OF BLOOD CHEMISTRY: ICD-10-CM

## 2018-05-08 DIAGNOSIS — K76.82 HEPATIC ENCEPHALOPATHY (HCC): ICD-10-CM

## 2018-05-08 DIAGNOSIS — R10.84 GENERALIZED ABDOMINAL PAIN: Primary | ICD-10-CM

## 2018-05-08 DIAGNOSIS — K74.69 OTHER CIRRHOSIS OF LIVER (HCC): ICD-10-CM

## 2018-05-08 PROCEDURE — 99214 OFFICE O/P EST MOD 30 MIN: CPT | Performed by: INTERNAL MEDICINE

## 2018-05-08 RX ORDER — INSULIN ASPART 100 [IU]/ML
INJECTION, SOLUTION INTRAVENOUS; SUBCUTANEOUS
COMMUNITY
Start: 2018-03-01 | End: 2018-05-08 | Stop reason: ALTCHOICE

## 2018-05-08 RX ORDER — CLOTRIMAZOLE 10 MG/1
10 LOZENGE ORAL; TOPICAL
Qty: 70 TABLET | Refills: 1 | Status: SHIPPED | OUTPATIENT
Start: 2018-05-08 | End: 2018-06-25

## 2018-05-08 NOTE — PROGRESS NOTES
"Tali Zabala is a 57 y.o.. female is here today for follow-up.    Chief Complaint   Patient presents with   • Cirrhosis   • Edema   • Bloated   • Nausea   • Vomiting   • Abdominal Pain       History of Present Illness  Patient continues not to feel well ever and not to do well.  She states that she and her entire [place] and that no one in the family has been right since.  She has had visits to the emergency room with blood sugars in excess of 600 and with the patient being mildly ketotic.  She is not sure why her disease is been so different call to control even though she states that oftentimes her problems are blamed on gastroparesis.  She cannot take metoclopramide due to neurologic side effects.  She tried low-dose domperidone and did not find it helpful.  The following portions of the patient's history were reviewed and updated as appropriate: allergies, current medications, past family history, past medical history, past social history, past surgical history and problem list.    Review of Systems   Respiratory: Negative for shortness of breath.    Cardiovascular: Positive for leg swelling. Negative for chest pain.   Skin: Positive for rash.   All other systems reviewed and are negative.      Objective   Physical Exam   Constitutional: She appears well-developed and well-nourished.   Nursing note and vitals reviewed.    Her abdominal examination is \"touchy\" but there is no specific point tenderness or mass and she does not have gross ascites.  She has bilateral pedal edema and moderate erythema involving both lower extremities which appears to be stasis change.    Assessment/Plan   Problems Addressed this Visit        Digestive    Cirrhosis of liver       Nervous and Auditory    Generalized abdominal pain - Primary    Relevant Orders    Basic Metabolic Panel    CBC & Differential    Lipid Panel    Ammonia    Hepatic encephalopathy    Relevant Orders    Ammonia    "    Other    Abnormal finding of blood chemistry     Relevant Orders    Lipid Panel    Ammonia      Other Visit Diagnoses    None.       I think the bulk of her problems are metabolic.  I'm of the opinion, as are most gastroenterologists, that it is the metabolic dysfunction that drives the abdominal pain, not the other way around, and I do not think her abdominal symptoms will get the slightest bit better until her overall diabetic control improves.  I did not see a recent triglycerides and it would not surprise me at all if her's are skyhigh so I will go ahead and check a lipid panel along with other baseline lab.  I may want to start her on diuretic therapy to address her fluid retention and I will need to see her renal functions before I do that.  I'll see her back shortly.

## 2018-05-10 ENCOUNTER — APPOINTMENT (OUTPATIENT)
Dept: LAB | Facility: HOSPITAL | Age: 58
End: 2018-05-10

## 2018-05-10 ENCOUNTER — TELEPHONE (OUTPATIENT)
Dept: GASTROENTEROLOGY | Facility: CLINIC | Age: 58
End: 2018-05-10

## 2018-05-10 LAB
AMMONIA BLD-SCNC: 30 UMOL/L (ref 11–51)
ANION GAP SERPL CALCULATED.3IONS-SCNC: 15.4 MMOL/L
BASOPHILS # BLD AUTO: 0.03 10*3/MM3 (ref 0–0.2)
BASOPHILS NFR BLD AUTO: 0.8 % (ref 0–1.5)
BUN BLD-MCNC: 5 MG/DL (ref 6–20)
BUN/CREAT SERPL: 5.9 (ref 7–25)
CALCIUM SPEC-SCNC: 9.3 MG/DL (ref 8.6–10.5)
CHLORIDE SERPL-SCNC: 97 MMOL/L (ref 98–107)
CHOLEST SERPL-MCNC: 158 MG/DL (ref 0–200)
CO2 SERPL-SCNC: 21.6 MMOL/L (ref 22–29)
CREAT BLD-MCNC: 0.85 MG/DL (ref 0.57–1)
DEPRECATED RDW RBC AUTO: 51.7 FL (ref 37–54)
EOSINOPHIL # BLD AUTO: 0.25 10*3/MM3 (ref 0–0.7)
EOSINOPHIL NFR BLD AUTO: 6.7 % (ref 0.3–6.2)
ERYTHROCYTE [DISTWIDTH] IN BLOOD BY AUTOMATED COUNT: 16.1 % (ref 11.7–13)
GFR SERPL CREATININE-BSD FRML MDRD: 69 ML/MIN/1.73
GLUCOSE BLD-MCNC: 407 MG/DL (ref 65–99)
HCT VFR BLD AUTO: 36.4 % (ref 35.6–45.5)
HDLC SERPL-MCNC: 46 MG/DL (ref 40–60)
HGB BLD-MCNC: 11.5 G/DL (ref 11.9–15.5)
IMM GRANULOCYTES # BLD: 0.01 10*3/MM3 (ref 0–0.03)
IMM GRANULOCYTES NFR BLD: 0.3 % (ref 0–0.5)
LDLC SERPL CALC-MCNC: 96 MG/DL (ref 0–100)
LDLC/HDLC SERPL: 2.09 {RATIO}
LYMPHOCYTES # BLD AUTO: 0.54 10*3/MM3 (ref 0.9–4.8)
LYMPHOCYTES NFR BLD AUTO: 14.6 % (ref 19.6–45.3)
MCH RBC QN AUTO: 27.7 PG (ref 26.9–32)
MCHC RBC AUTO-ENTMCNC: 31.6 G/DL (ref 32.4–36.3)
MCV RBC AUTO: 87.7 FL (ref 80.5–98.2)
MONOCYTES # BLD AUTO: 0.39 10*3/MM3 (ref 0.2–1.2)
MONOCYTES NFR BLD AUTO: 10.5 % (ref 5–12)
NEUTROPHILS # BLD AUTO: 2.5 10*3/MM3 (ref 1.9–8.1)
NEUTROPHILS NFR BLD AUTO: 67.4 % (ref 42.7–76)
PLATELET # BLD AUTO: 107 10*3/MM3 (ref 140–500)
PMV BLD AUTO: 11.2 FL (ref 6–12)
POTASSIUM BLD-SCNC: 4.1 MMOL/L (ref 3.5–5.2)
RBC # BLD AUTO: 4.15 10*6/MM3 (ref 3.9–5.2)
SODIUM BLD-SCNC: 134 MMOL/L (ref 136–145)
TRIGL SERPL-MCNC: 79 MG/DL (ref 0–150)
VLDLC SERPL-MCNC: 15.8 MG/DL (ref 5–40)
WBC NRBC COR # BLD: 3.71 10*3/MM3 (ref 4.5–10.7)

## 2018-05-10 PROCEDURE — 36415 COLL VENOUS BLD VENIPUNCTURE: CPT | Performed by: INTERNAL MEDICINE

## 2018-05-10 PROCEDURE — 80061 LIPID PANEL: CPT | Performed by: INTERNAL MEDICINE

## 2018-05-10 PROCEDURE — 80048 BASIC METABOLIC PNL TOTAL CA: CPT | Performed by: INTERNAL MEDICINE

## 2018-05-10 PROCEDURE — 82140 ASSAY OF AMMONIA: CPT | Performed by: INTERNAL MEDICINE

## 2018-05-10 PROCEDURE — 85025 COMPLETE CBC W/AUTO DIFF WBC: CPT | Performed by: INTERNAL MEDICINE

## 2018-05-10 NOTE — TELEPHONE ENCOUNTER
[facility] called with glucose level 407.  Dr Coleman notified.  Pt has history of elevated glucose in this range and even higher.  No new orders.

## 2018-05-11 RX ORDER — PROMETHAZINE HYDROCHLORIDE 25 MG/1
TABLET ORAL
Qty: 30 TABLET | Refills: 0 | Status: SHIPPED | OUTPATIENT
Start: 2018-05-11 | End: 2018-07-12 | Stop reason: SDUPTHER

## 2018-05-13 ENCOUNTER — RESULTS ENCOUNTER (OUTPATIENT)
Dept: GASTROENTEROLOGY | Facility: CLINIC | Age: 58
End: 2018-05-13

## 2018-05-13 DIAGNOSIS — R10.84 GENERALIZED ABDOMINAL PAIN: ICD-10-CM

## 2018-05-13 DIAGNOSIS — K76.82 HEPATIC ENCEPHALOPATHY (HCC): ICD-10-CM

## 2018-05-13 DIAGNOSIS — R79.9 ABNORMAL FINDING OF BLOOD CHEMISTRY: ICD-10-CM

## 2018-05-14 ENCOUNTER — TELEPHONE (OUTPATIENT)
Dept: GASTROENTEROLOGY | Facility: CLINIC | Age: 58
End: 2018-05-14

## 2018-05-14 ENCOUNTER — OFFICE VISIT (OUTPATIENT)
Dept: ENDOCRINOLOGY | Age: 58
End: 2018-05-14

## 2018-05-14 VITALS
WEIGHT: 167 LBS | HEIGHT: 67 IN | DIASTOLIC BLOOD PRESSURE: 70 MMHG | OXYGEN SATURATION: 98 % | SYSTOLIC BLOOD PRESSURE: 138 MMHG | HEART RATE: 110 BPM | BODY MASS INDEX: 26.21 KG/M2

## 2018-05-14 DIAGNOSIS — E78.5 HYPERLIPIDEMIA, UNSPECIFIED HYPERLIPIDEMIA TYPE: ICD-10-CM

## 2018-05-14 DIAGNOSIS — E11.42 DIABETIC PERIPHERAL NEUROPATHY (HCC): ICD-10-CM

## 2018-05-14 DIAGNOSIS — K31.84 GASTROPARESIS: ICD-10-CM

## 2018-05-14 DIAGNOSIS — IMO0002 TYPE 2 DIABETES MELLITUS, UNCONTROLLED, WITH NEUROPATHY: Primary | ICD-10-CM

## 2018-05-14 DIAGNOSIS — K75.81 NASH (NONALCOHOLIC STEATOHEPATITIS): ICD-10-CM

## 2018-05-14 PROCEDURE — 99214 OFFICE O/P EST MOD 30 MIN: CPT | Performed by: INTERNAL MEDICINE

## 2018-05-14 RX ORDER — SUCRALFATE 1 G/1
1 TABLET ORAL 2 TIMES DAILY
Qty: 60 TABLET | Refills: 2 | Status: SHIPPED | OUTPATIENT
Start: 2018-05-14 | End: 2018-08-10 | Stop reason: SDUPTHER

## 2018-05-14 RX ORDER — FUROSEMIDE 40 MG/1
40 TABLET ORAL DAILY
Qty: 30 TABLET | Refills: 2 | Status: SHIPPED | OUTPATIENT
Start: 2018-05-14 | End: 2018-05-23 | Stop reason: HOSPADM

## 2018-05-14 NOTE — TELEPHONE ENCOUNTER
----- Message from Rich Coleman MD sent at 5/10/2018  6:22 PM EDT -----  Please call the patient regarding her abnormal result. Other than the high glucose it looks good.

## 2018-05-14 NOTE — TELEPHONE ENCOUNTER
Pt notified of stable lab results (BMP,CBC,Lipid Panel) as reviewed by Dr Coleman.  Glucose elevated (407).  Pt has appt with Endocrinology today.    Dr Coleman, pt wants to know if you plan on starting Lasix?  If so, please send to [facility] listed in chart.    Also, pt needs refill on Carafate BID.    Per Dr Coleman, okay to start on Lasix 40 mg po daily, #30, 2 refills.  E-scribed to [facility]. Also refilled Carafate 1 g po BID, #60, 2 refills.    Pt notified of above.

## 2018-05-14 NOTE — PROGRESS NOTES
57 y.o.    Patient Care Team:  Blanca Pitts MD as PCP - General (Internal Medicine)  Sukhdeep Mcdowell MD as Consulting Physician (Hematology and Oncology)  Blanca Pitts MD as Referring Physician (Internal Medicine)    Chief Complaint:    HOSPITAL FOLLOW UP ER/ TYPE 2 DIABETES MELLITUS  Subjective     HPI     57-year-old white female is here for follow-up of uncontrolled type 2 diabetes mellitus.    Type 2 diabetes mellitus  Diagnosed in her a few 20s.  Has been on insulin for the last 20 years.  During last visit patient was started on U-500 insulin.  Currently on U-530 units with each meal and 15 units with snacks and high dose sliding scale with each meal and with snacks.  She has been checking her blood sugars 2-4 times a day.  Average blood sugars are around 350 mg/dL range.  She did not have any low blood sugars in the last 2 weeks, however patient is extremely concerned about low blood sugars.  The lowest blood sugar that she had in the last 1 month was 70 mg/dL.  Highest blood sugar has been consistently at least 3-4 times inability around 400 mg/dL.  Last eye examination was in January 2018 and no history of diabetic retinopathy.  Does have history of diabetic neuropathy and is on Lyrica 75 mg oral twice daily.  No history of CAD, CVA, CK D per patient.  Her diabetic diet intake is limited due to her gastroparesis and also the fact that she has to eat high-calorie diet secondary to her liver cirrhosis.  Negative urine microalbuminuria.       Pancreatic divisum : No hx of pancreatitis per pt.       Liver disease/DUKES - She is required to eat 80 gm of protein per day and she drinks two Glucerna shakes at bed time. Sees Dr. Jordan from [facility].       Gastroparesis - Sees Dr. Coleman.        Hyperlipidemia - on pravastatin 40 mg po daily.            Interval History      The following portions of the patient's history were reviewed and updated as appropriate: allergies, current medications, past family history,  past medical history, past social history, past surgical history and problem list.    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   • Cirrhosis    • Colon polyps    • Deafness    • Depression    • Diabetes mellitus     Adult onset, insulin requiring   • Duodenal ulcer with hemorrhage    • Esophageal varices determined by endoscopy    • Fibromyalgia    • Gallbladder disease    • Gastroparesis    • Glaucoma    • Granuloma annulare    • H/O B12 deficiency    • H/O Bleeding ulcer     And gastroesophageal varices   • H/O mixed connective tissue disorder    • Hemorrhoids    • Hiatal hernia    • History of transfusion    • Hx of being hospitalized     In [place] for GI bleeding from ulcer and varices   • Hyperlipidemia    • Migraine    • DUKES (nonalcoholic steatohepatitis) 11/2016   • BRICE (obstructive sleep apnea)    • Pancreas divisum    • Pancytopenia     Chronic, due to cirrhosis and hypersplenism   • Peptic ulcer disease     And esophageal varices   • PONV (postoperative nausea and vomiting)    • RA (rheumatoid arthritis)    • Seizure disorder     LAST ONE SEVERAL YEARS AGO   • Seizures    • Systemic lupus      Family History   Problem Relation Age of Onset   • Liver disease Other    • Depression Other    • Heart disease Other    • Hypertension Other    • Diabetes Other    • Breast cancer Other    • Brain cancer Other    • Uterine cancer Other    • Colon cancer Other    • Leukemia Other    • Colon cancer Maternal Aunt    • Hypertension Mother    • Diabetes type II Mother    • Rheum arthritis Mother    • Liver disease Mother      DUKES   • Heart disease Father    • Diabetes type II Father    • Diabetes type II Sister    • Lupus Sister    • Malig Hyperthermia Neg Hx      Social History     Social History   • Marital status:       "Spouse name: Jose   • Number of children: N/A   • Years of education: N/A     Occupational History   •  Disabled     Social History Main Topics   • Smoking status: Former Smoker     Packs/day: 0.00     Years: 0.00     Quit date: 12/17/2015   • Smokeless tobacco: Never Used   • Alcohol use No   • Drug use: No   • Sexual activity: Defer     Other Topics Concern   • Not on file     Social History Narrative    Moved to [place] from [place]. She is currently medically disabled.     Allergies   Allergen Reactions   • Albuterol Anaphylaxis   • Lactulose Nausea And Vomiting and Other (See Comments)     Severe abdominal pain   • Tramadol Other (See Comments)     Per pt causes her \"palsy, meaning shaking and tremors\" and gi upset, nausea   • Quinine Derivatives Nausea And Vomiting       Current Outpatient Prescriptions:   •  ALPRAZolam (XANAX) 0.5 MG tablet, TAKE ONE TABLET BY MOUTH TWICE A DAY AS NEEDED FOR ANXIETY, Disp: 60 tablet, Rfl: 2  •  buPROPion XL (WELLBUTRIN XL) 150 MG 24 hr tablet, TAKE ONE TABLET BY MOUTH DAILY, Disp: 90 tablet, Rfl: 3  •  cholecalciferol (VITAMIN D3) 1000 UNITS tablet, Take 1,000 Units by mouth Daily., Disp: , Rfl:   •  clotrimazole (MYCELEX) 10 MG omer, Take 1 tablet by mouth 5 (Five) Times a Day., Disp: 70 tablet, Rfl: 1  •  cycloSPORINE (RESTASIS) 0.05 % ophthalmic emulsion, 1 drop 2 (two) times a day., Disp: , Rfl:   •  DULoxetine (CYMBALTA) 30 MG capsule, TAKE THREE CAPSULES BY MOUTH DAILY, Disp: 90 capsule, Rfl: 3  •  ferrous sulfate 325 (65 FE) MG tablet, TAKE ONE TABLET BY MOUTH TWICE A DAY, Disp: 60 tablet, Rfl: 1  •  folic acid (FOLVITE) 1 MG tablet, Take 1 mg by mouth Daily., Disp: , Rfl:   •  hydrOXYzine (ATARAX) 25 MG tablet, Take 25 mg by mouth 3 (Three) Times a Day As Needed for Itching., Disp: , Rfl:   •  Insulin Pen Needle 31G X 5 MM misc, To inject 5 -6 times per day, Disp: 200 each, Rfl: 3  •  Insulin Regular Human, Conc, (HUMULIN R U-500 KWIKPEN) 500 UNIT/ML solution " pen-injector CONCENTRATED injection, 25 units with each meal plus the sliding scale. Maximum of 50 units per meal., Disp: 5 pen, Rfl: 3  •  levETIRAcetam (KEPPRA) 500 MG tablet, TAKE ONE TABLET BY MOUTH TWICE A DAY, Disp: 60 tablet, Rfl: 1  •  LYRICA 75 MG capsule, TAKE ONE CAPSULE BY MOUTH TWICE A DAY, Disp: 60 capsule, Rfl: 0  •  Multiple Vitamin (MULTI-VITAMIN PO), Take 1 tablet by mouth Daily., Disp: , Rfl:   •  ONE TOUCH ULTRA TEST test strip, USE FOR TESTING FOUR TIMES A DAY, Disp: 200 each, Rfl: 4  •  OxyCODONE HCl (OXAYDO) 7.5 MG tablet , Take 7.5 mg by mouth Every 8 (Eight) Hours As Needed., Disp: , Rfl:   •  pantoprazole (PROTONIX) 40 MG EC tablet, TAKE ONE TABLET BY MOUTH DAILY, Disp: 30 tablet, Rfl: 4  •  promethazine (PHENERGAN) 25 MG tablet, TAKE ONE TABLET BY MOUTH EVERY 6 HOURS AS NEEDED FOR NAUSEA OR VOMITING, Disp: 30 tablet, Rfl: 0  •  RELISTOR 150 MG tablet, 150 mg Daily., Disp: , Rfl:   •  spironolactone (ALDACTONE) 100 MG tablet, TAKE ONE TABLET BY MOUTH DAILY, Disp: 30 tablet, Rfl: 4  •  sucralfate (CARAFATE) 1 g tablet, Take 1 g by mouth 2 (Two) Times a Day., Disp: , Rfl:   •  trimethobenzamide (TIGAN) 300 MG capsule, Take 300 mg by mouth 3 (Three) Times a Day., Disp: , Rfl:   •  vitamin B-12 (CYANOCOBALAMIN) 1000 MCG tablet, Take 1,000 mcg by mouth daily., Disp: , Rfl:   •  XIFAXAN 550 MG tablet, TAKE ONE TABLET BY MOUTH TWICE A DAY, Disp: 60 tablet, Rfl: 4        Review of Systems   Constitutional: Positive for appetite change and fatigue. Negative for fever.   Eyes: Negative for visual disturbance.   Respiratory: Negative for shortness of breath.    Cardiovascular: Positive for leg swelling. Negative for palpitations.   Gastrointestinal: Positive for abdominal pain, diarrhea and vomiting.   Endocrine: Negative for polydipsia and polyuria.   Musculoskeletal: Positive for joint swelling.   Skin: Positive for rash.   Neurological: Positive for dizziness, weakness and numbness.  "  Psychiatric/Behavioral: Negative for behavioral problems.       Objective       Vitals:    05/14/18 1113   BP: 138/70   Pulse: 110   SpO2: 98%   Weight: 75.8 kg (167 lb)   Height: 168.9 cm (66.5\")     Body mass index is 26.55 kg/m².      Physical Exam   Constitutional: She is oriented to person, place, and time. She appears well-nourished.   Obese     HENT:   Head: Normocephalic and atraumatic.   Wide neck   Eyes: Conjunctivae and EOM are normal. No scleral icterus.   Neck: Normal range of motion. Neck supple. No thyromegaly present.   Acanthosis nigricans   Cardiovascular: Normal rate and normal heart sounds.    Pulmonary/Chest: Effort normal and breath sounds normal. No stridor. She has no wheezes.   Abdominal: Soft. Bowel sounds are normal. She exhibits no distension. There is no tenderness.   Central obesity   Musculoskeletal: She exhibits no edema or tenderness.   Neurological: She is alert and oriented to person, place, and time.   Skin: Skin is warm and dry. She is not diaphoretic.   Psychiatric: She has a normal mood and affect.   Vitals reviewed.    Results Review:     I reviewed the patient's new clinical results.    Medical records reviewed  Summary: done      Results Encounter on 05/13/2018   Component Date Value Ref Range Status   • Ammonia 05/10/2018 30  11 - 51 umol/L Final     Lab Results   Component Value Date    HGBA1C 11.20 (H) 12/16/2017    HGBA1C 6.86 (H) 04/25/2017    HGBA1C 5.80 (H) 03/07/2017     Lab Results   Component Value Date    MICROALBUR <1.2 12/17/2017    CREATININE 0.85 05/10/2018     Imaging Results (most recent)     None                Assessment and Plan:    Silvia was seen today for diabetes.    Diagnoses and all orders for this visit:    Type 2 diabetes mellitus, uncontrolled, with neuropathy  -     Basic Metabolic Panel; Future  -     Hemoglobin A1c; Future  -     TSH; Future  -     Lipid Panel; Future    Diabetic peripheral neuropathy  -     Basic Metabolic Panel; Future  -   " "  Hemoglobin A1c; Future  -     TSH; Future  -     Lipid Panel; Future    DUKES (nonalcoholic steatohepatitis)  -     Basic Metabolic Panel; Future  -     Hemoglobin A1c; Future  -     TSH; Future  -     Lipid Panel; Future    Hyperlipidemia, unspecified hyperlipidemia type  -     Basic Metabolic Panel; Future  -     Hemoglobin A1c; Future  -     TSH; Future  -     Lipid Panel; Future    Gastroparesis  -     Basic Metabolic Panel; Future  -     Hemoglobin A1c; Future  -     TSH; Future  -     Lipid Panel; Future      Type 2 diabetes mellitus-uncontrolled  Patient's blood glucose control has been challenging due to her gastroparesis and her Dukes.  Patient does not have great appetite and has nausea due to her gastroparesis which limits diabetic diet and as a result she continuously tends to have high blood sugars.  Also her great concern for hypoglycemia  Challenging to control her blood sugars  Will pursue continuous glucose monitoring for the patient which will help her feel for hypoglycemia.  In the interim we will increase U-500 insulin to 40 units with each meal and 22 units with snack  Advised the patient to use the high dose sliding scale with snacks along with the meals.    Did the paperwork for continues glucose monitoring.    Hyperlipidemia  Continue pravastatin next    Diabetic neuropathy  Continue Lyrica.    Reviewed Lab results with the patient.         Merary Burnett MD  05/14/18    EMR Dragon / transcription disclaimer:     \"Dictated utilizing Dragon dictation\".  "

## 2018-05-15 NOTE — PLAN OF CARE
Problem: Patient Care Overview (Adult)  Goal: Plan of Care Review  Outcome: Ongoing (interventions implemented as appropriate)    02/19/17 0454   Coping/Psychosocial Response Interventions   Plan Of Care Reviewed With patient   Patient Care Overview   Progress improving   Outcome Evaluation   Outcome Summary/Follow up Plan pt. transfers with assistX1,ambulates to bathroom tolerates well, no bleeding noted this shift, pain medicaiton given once and effective,,vss,no distress noted            Attending Only

## 2018-05-16 ENCOUNTER — HOSPITAL ENCOUNTER (INPATIENT)
Facility: HOSPITAL | Age: 58
LOS: 7 days | Discharge: HOME | End: 2018-05-23
Attending: EMERGENCY MEDICINE | Admitting: HOSPITALIST

## 2018-05-16 DIAGNOSIS — D69.6 THROMBOCYTOPENIA (HCC): Primary | ICD-10-CM

## 2018-05-16 DIAGNOSIS — R04.0 NOSEBLEED: ICD-10-CM

## 2018-05-16 LAB
ABO GROUP BLD: NORMAL
ALBUMIN SERPL-MCNC: 3.4 G/DL (ref 3.5–5.2)
ALBUMIN/GLOB SERPL: 1 G/DL
ALP SERPL-CCNC: 125 U/L (ref 39–117)
ALT SERPL W P-5'-P-CCNC: 23 U/L (ref 1–33)
ANION GAP SERPL CALCULATED.3IONS-SCNC: 14.6 MMOL/L
APTT PPP: 30.2 SECONDS (ref 22.7–35.4)
AST SERPL-CCNC: 45 U/L (ref 1–32)
BASOPHILS # BLD AUTO: 0.02 10*3/MM3 (ref 0–0.2)
BASOPHILS NFR BLD AUTO: 0.8 % (ref 0–1.5)
BILIRUB SERPL-MCNC: 0.8 MG/DL (ref 0.1–1.2)
BLD GP AB SCN SERPL QL: NEGATIVE
BUN BLD-MCNC: 6 MG/DL (ref 6–20)
BUN/CREAT SERPL: 7.8 (ref 7–25)
CALCIUM SPEC-SCNC: 8.7 MG/DL (ref 8.6–10.5)
CHLORIDE SERPL-SCNC: 101 MMOL/L (ref 98–107)
CO2 SERPL-SCNC: 24.4 MMOL/L (ref 22–29)
CREAT BLD-MCNC: 0.77 MG/DL (ref 0.57–1)
DEPRECATED RDW RBC AUTO: 50.2 FL (ref 37–54)
EOSINOPHIL # BLD AUTO: 0.11 10*3/MM3 (ref 0–0.7)
EOSINOPHIL NFR BLD AUTO: 4.7 % (ref 0.3–6.2)
ERYTHROCYTE [DISTWIDTH] IN BLOOD BY AUTOMATED COUNT: 15.8 % (ref 11.7–13)
FIBRINOGEN PPP-MCNC: 357 MG/DL (ref 219–464)
GFR SERPL CREATININE-BSD FRML MDRD: 78 ML/MIN/1.73
GLOBULIN UR ELPH-MCNC: 3.4 GM/DL
GLUCOSE BLD-MCNC: 347 MG/DL (ref 65–99)
HCT VFR BLD AUTO: 35.6 % (ref 35.6–45.5)
HGB BLD-MCNC: 11.4 G/DL (ref 11.9–15.5)
IMM GRANULOCYTES # BLD: 0.01 10*3/MM3 (ref 0–0.03)
IMM GRANULOCYTES NFR BLD: 0.4 % (ref 0–0.5)
INR PPP: 1.26 (ref 0.9–1.1)
LYMPHOCYTES # BLD AUTO: 0.55 10*3/MM3 (ref 0.9–4.8)
LYMPHOCYTES NFR BLD AUTO: 23.3 % (ref 19.6–45.3)
MCH RBC QN AUTO: 27.7 PG (ref 26.9–32)
MCHC RBC AUTO-ENTMCNC: 32 G/DL (ref 32.4–36.3)
MCV RBC AUTO: 86.6 FL (ref 80.5–98.2)
MONOCYTES # BLD AUTO: 0.21 10*3/MM3 (ref 0.2–1.2)
MONOCYTES NFR BLD AUTO: 8.9 % (ref 5–12)
NEUTROPHILS # BLD AUTO: 1.47 10*3/MM3 (ref 1.9–8.1)
NEUTROPHILS NFR BLD AUTO: 62.3 % (ref 42.7–76)
NRBC BLD MANUAL-RTO: 0 /100 WBC (ref 0–0)
PLAT MORPH BLD: NORMAL
PLATELET # BLD AUTO: 3 10*3/MM3 (ref 140–500)
PLATELET # BLD AUTO: 6 10*3/MM3 (ref 140–500)
PLATELETS.RETICULATED NFR BLD AUTO: 18.4 % (ref 0.9–6.5)
PMV BLD AUTO: ABNORMAL FL (ref 6–12)
POTASSIUM BLD-SCNC: 4.3 MMOL/L (ref 3.5–5.2)
PROT SERPL-MCNC: 6.8 G/DL (ref 6–8.5)
PROTHROMBIN TIME: 15.6 SECONDS (ref 11.7–14.2)
RBC # BLD AUTO: 4.11 10*6/MM3 (ref 3.9–5.2)
RBC MORPH BLD: NORMAL
RH BLD: POSITIVE
SODIUM BLD-SCNC: 140 MMOL/L (ref 136–145)
T&S EXPIRATION DATE: NORMAL
WBC MORPH BLD: NORMAL
WBC NRBC COR # BLD: 2.36 10*3/MM3 (ref 4.5–10.7)

## 2018-05-16 PROCEDURE — 80053 COMPREHEN METABOLIC PANEL: CPT | Performed by: EMERGENCY MEDICINE

## 2018-05-16 PROCEDURE — 99284 EMERGENCY DEPT VISIT MOD MDM: CPT

## 2018-05-16 PROCEDURE — 86900 BLOOD TYPING SEROLOGIC ABO: CPT | Performed by: EMERGENCY MEDICINE

## 2018-05-16 PROCEDURE — 36430 TRANSFUSION BLD/BLD COMPNT: CPT

## 2018-05-16 PROCEDURE — 86850 RBC ANTIBODY SCREEN: CPT | Performed by: EMERGENCY MEDICINE

## 2018-05-16 PROCEDURE — 85025 COMPLETE CBC W/AUTO DIFF WBC: CPT | Performed by: EMERGENCY MEDICINE

## 2018-05-16 PROCEDURE — P9035 PLATELET PHERES LEUKOREDUCED: HCPCS

## 2018-05-16 PROCEDURE — 36415 COLL VENOUS BLD VENIPUNCTURE: CPT

## 2018-05-16 PROCEDURE — 85007 BL SMEAR W/DIFF WBC COUNT: CPT | Performed by: EMERGENCY MEDICINE

## 2018-05-16 PROCEDURE — 86431 RHEUMATOID FACTOR QUANT: CPT | Performed by: INTERNAL MEDICINE

## 2018-05-16 PROCEDURE — 85730 THROMBOPLASTIN TIME PARTIAL: CPT | Performed by: EMERGENCY MEDICINE

## 2018-05-16 PROCEDURE — 85384 FIBRINOGEN ACTIVITY: CPT | Performed by: INTERNAL MEDICINE

## 2018-05-16 PROCEDURE — 85055 RETICULATED PLATELET ASSAY: CPT | Performed by: INTERNAL MEDICINE

## 2018-05-16 PROCEDURE — 30233R1 TRANSFUSION OF NONAUTOLOGOUS PLATELETS INTO PERIPHERAL VEIN, PERCUTANEOUS APPROACH: ICD-10-PCS | Performed by: EMERGENCY MEDICINE

## 2018-05-16 PROCEDURE — 85610 PROTHROMBIN TIME: CPT | Performed by: EMERGENCY MEDICINE

## 2018-05-16 PROCEDURE — 86901 BLOOD TYPING SEROLOGIC RH(D): CPT | Performed by: EMERGENCY MEDICINE

## 2018-05-16 PROCEDURE — 25010000002 ONDANSETRON PER 1 MG: Performed by: EMERGENCY MEDICINE

## 2018-05-16 PROCEDURE — 63710000001 DIPHENHYDRAMINE PER 50 MG: Performed by: EMERGENCY MEDICINE

## 2018-05-16 RX ORDER — ONDANSETRON 2 MG/ML
4 INJECTION INTRAMUSCULAR; INTRAVENOUS ONCE
Status: COMPLETED | OUTPATIENT
Start: 2018-05-16 | End: 2018-05-16

## 2018-05-16 RX ORDER — DIPHENHYDRAMINE HCL 25 MG
50 CAPSULE ORAL ONCE
Status: COMPLETED | OUTPATIENT
Start: 2018-05-16 | End: 2018-05-16

## 2018-05-16 RX ORDER — HYDROCODONE BITARTRATE AND ACETAMINOPHEN 5; 325 MG/1; MG/1
1 TABLET ORAL EVERY 4 HOURS PRN
Status: DISCONTINUED | OUTPATIENT
Start: 2018-05-16 | End: 2018-05-17

## 2018-05-16 RX ORDER — SODIUM CHLORIDE 0.9 % (FLUSH) 0.9 %
1-10 SYRINGE (ML) INJECTION AS NEEDED
Status: DISCONTINUED | OUTPATIENT
Start: 2018-05-16 | End: 2018-05-23 | Stop reason: HOSPADM

## 2018-05-16 RX ORDER — DEXTROSE MONOHYDRATE 25 G/50ML
25 INJECTION, SOLUTION INTRAVENOUS
Status: DISCONTINUED | OUTPATIENT
Start: 2018-05-16 | End: 2018-05-23 | Stop reason: HOSPADM

## 2018-05-16 RX ORDER — SODIUM CHLORIDE 0.9 % (FLUSH) 0.9 %
10 SYRINGE (ML) INJECTION AS NEEDED
Status: DISCONTINUED | OUTPATIENT
Start: 2018-05-16 | End: 2018-05-23 | Stop reason: HOSPADM

## 2018-05-16 RX ORDER — PANTOPRAZOLE SODIUM 40 MG/10ML
40 INJECTION, POWDER, LYOPHILIZED, FOR SOLUTION INTRAVENOUS EVERY 12 HOURS SCHEDULED
Status: DISCONTINUED | OUTPATIENT
Start: 2018-05-16 | End: 2018-05-18

## 2018-05-16 RX ORDER — ACETAMINOPHEN 325 MG/1
650 TABLET ORAL EVERY 4 HOURS PRN
Status: DISCONTINUED | OUTPATIENT
Start: 2018-05-16 | End: 2018-05-23 | Stop reason: HOSPADM

## 2018-05-16 RX ORDER — NICOTINE POLACRILEX 4 MG
15 LOZENGE BUCCAL
Status: DISCONTINUED | OUTPATIENT
Start: 2018-05-16 | End: 2018-05-23 | Stop reason: HOSPADM

## 2018-05-16 RX ORDER — ACETAMINOPHEN 500 MG
TABLET ORAL
Status: DISPENSED
Start: 2018-05-16 | End: 2018-05-17

## 2018-05-16 RX ORDER — ACETAMINOPHEN 500 MG
1000 TABLET ORAL ONCE
Status: COMPLETED | OUTPATIENT
Start: 2018-05-16 | End: 2018-05-16

## 2018-05-16 RX ADMIN — ACETAMINOPHEN 1000 MG: 500 TABLET, FILM COATED ORAL at 23:00

## 2018-05-16 RX ADMIN — ONDANSETRON 4 MG: 2 INJECTION INTRAMUSCULAR; INTRAVENOUS at 21:17

## 2018-05-16 RX ADMIN — DIPHENHYDRAMINE HYDROCHLORIDE 50 MG: 25 CAPSULE ORAL at 23:08

## 2018-05-16 NOTE — ED TRIAGE NOTES
"Pt complains of nose bleed earlier this morning. States that it was lasted \"at least 3 hours\" . Pt complains of dizziness and she \"feels like I'm choking on blood\". Hx of GI bleeding from liver disease. Sees Dr. Michelle at [facility]. No active bleeding at this time. Denies taking blood thinners  "

## 2018-05-16 NOTE — ED PROVIDER NOTES
" EMERGENCY DEPARTMENT ENCOUNTER    CHIEF COMPLAINT  Chief Complaint: Epistaxis, dizziness  History given by: Pt  History limited by: nothing  Room Number: 05/05  PMD: Blanca Pitts MD      HPI:  Pt is a 57 y.o. female who presents complaining of intermittent episodes of epistaxis which began \"early this morning\" and bled for \"at least three hours.\" Pt states that she stuffed her nares with tissue and laid her head back to stop active bleeding. Pt also complains of hemoptysis with blood clots, hematemesis, and dizziness. Pt admits to a similar previous episode four days ago. Pt is not actively bleeding at this time and is not on any anticoagulants.     Duration:  \"at least three hours\"  Onset: sudden  Timing: intermittent   Location: bilateral nares  Quality: epistaxis  Intensity/Severity: mild  Progression: improved, no active bleeding at this time  Associated Symptoms:  hemoptysis with blood clots, hematemesis, and dizziness  Aggravating Factors: none mentioned  Alleviating Factors: none mentioned  Previous Episodes: Similar episode four days ago  Treatment before arrival: Pt states that she stuffed her nares with tissue and laid her head back to stop active bleeding.    PAST MEDICAL HISTORY  Active Ambulatory Problems     Diagnosis Date Noted   • Cirrhosis of liver 03/08/2016   • Rheumatoid arthritis 03/08/2016   • Anxiety and depression 03/08/2016   • Type 2 diabetes mellitus, uncontrolled, with neuropathy 03/15/2016   • Fibrositis 03/15/2016   • Change in blood platelet count 03/15/2016   • Pancytopenia 04/12/2016   • Hypersplenism 04/12/2016   • Nausea 06/14/2016   • DUKES (nonalcoholic steatohepatitis) 06/14/2016   • Hyperlipidemia    • Generalized abdominal pain 02/14/2017   • UGI bleed 02/15/2017   • Ascites 02/21/2017   • Portal hypertension 02/22/2017   • Systemic lupus 02/22/2017   • Secondary esophageal varices without bleeding 02/22/2017   • Secondary esophageal varices with bleeding 03/07/2017   • " Gastroparesis 10/17/2017   • Cirrhosis of liver without ascites 11/17/2017   • Diabetic ketoacidosis without coma associated with type 2 diabetes mellitus 12/16/2017   • Diabetic peripheral neuropathy 12/17/2017   • History of seizure disorder 12/17/2017   • Hepatic encephalopathy 05/08/2018   • Abnormal finding of blood chemistry  05/08/2018     Resolved Ambulatory Problems     Diagnosis Date Noted   • Upper GI bleed 04/25/2017     Past Medical History:   Diagnosis Date   • Anemia    • Anxiety    • Arthritis    • Chromosomal abnormality    • Cirrhosis    • Colon polyps    • Deafness    • Depression    • Diabetes mellitus    • Duodenal ulcer with hemorrhage    • Esophageal varices determined by endoscopy    • Fibromyalgia    • Gallbladder disease    • Gastric varices    • Gastroparesis    • Glaucoma    • Granuloma annulare    • H/O B12 deficiency    • H/O Bleeding ulcer    • H/O mixed connective tissue disorder    • Hemorrhoids    • Hiatal hernia    • History of transfusion    • Hx of being hospitalized    • Hyperlipidemia    • Migraine    • DUKES (nonalcoholic steatohepatitis) 11/2016   • BRICE (obstructive sleep apnea)    • Pancreas divisum    • Pancytopenia    • Peptic ulcer disease    • PONV (postoperative nausea and vomiting)    • RA (rheumatoid arthritis)    • Seizure disorder    • Seizures    • Systemic lupus        PAST SURGICAL HISTORY  Past Surgical History:   Procedure Laterality Date   • BLADDER REPAIR  1991   • CATARACT EXTRACTION     • CHOLECYSTECTOMY  1994   • ENDOSCOPY N/A 11/7/2016    Procedure: ESOPHAGOGASTRODUODENOSCOPY WITH COLD POLYPECTOMY, BANDING,  AND CLIPS TIMES 2;  Surgeon: Rich Coleman MD;  Location: [facility] ENDOSCOPY;  Service:    • ENDOSCOPY N/A 12/22/2016    Procedure: ESOPHAGOGASTRODUODENOSCOPY WITH ESOPHAGEAL BANDING. 5 BANDS FIRED, 4 BANDS ADHERERD TO MUCOSA;  Surgeon: Rich Coleman MD;  Location: [facility] ENDOSCOPY;  Service:    • ENDOSCOPY N/A 2/15/2017    Procedure:  "ESOPHAGOGASTRODUODENOSCOPY AT BEDSIDE with esophageal varices banding (3);  Surgeon: Rich Coleman MD;  Location: [facility] ENDOSCOPY;  Service:    • ENDOSCOPY N/A 4/6/2017    Procedure: ESOPHAGOGASTRODUODENOSCOPY WITH ESOPHAGEAL VARICES BANDING x2;  Surgeon: Rich Coleman MD;  Location: [facility] ENDOSCOPY;  Service:    • ENDOSCOPY N/A 11/24/2017    Procedure: ESOPHAGOGASTRODUODENOSCOPY with biopsies;  Surgeon: Rich Coleman MD;  Location: [facility] ENDOSCOPY;  Service:    • ENDOSCOPY AND COLONOSCOPY  2014    Dr. Rich Coleman with findings of candida esophagitis   • EYE SURGERY     • HYSTERECTOMY  1986    partial   • JOINT REPLACEMENT     • KNEE SURGERY Right 1995    \"right knee recontstruction\"   • LIVER BIOPSY  01/2015   • MASS EXCISION     • STOMACH SURGERY         FAMILY HISTORY  Family History   Problem Relation Age of Onset   • Liver disease Other    • Depression Other    • Heart disease Other    • Hypertension Other    • Diabetes Other    • Breast cancer Other    • Brain cancer Other    • Uterine cancer Other    • Colon cancer Other    • Leukemia Other    • Colon cancer Maternal Aunt    • Hypertension Mother    • Diabetes type II Mother    • Rheum arthritis Mother    • Liver disease Mother         DUKES   • Heart disease Father    • Diabetes type II Father    • Diabetes type II Sister    • Lupus Sister    • Malig Hyperthermia Neg Hx        SOCIAL HISTORY  Social History     Social History   • Marital status:      Spouse name: Jose   • Number of children: N/A   • Years of education: N/A     Occupational History   •  Disabled     Social History Main Topics   • Smoking status: Former Smoker     Packs/day: 0.00     Years: 0.00     Quit date: 12/17/2015   • Smokeless tobacco: Never Used   • Alcohol use No   • Drug use: No   • Sexual activity: Defer     Other Topics Concern   • Not on file     Social History Narrative    Moved to [place] from [place]. She is currently medically disabled. "       ALLERGIES  Albuterol; Lactulose; Tramadol; and Quinine derivatives    REVIEW OF SYSTEMS  Review of Systems   Constitutional: Negative for fever.   HENT: Positive for nosebleeds.    Respiratory: Positive for cough (hemoptysis). Negative for shortness of breath.    Cardiovascular: Negative for chest pain.   Gastrointestinal: Positive for vomiting (hematemesis ).   Neurological: Positive for dizziness.       PHYSICAL EXAM  ED Triage Vitals [05/16/18 1905]   Temp Heart Rate Resp BP SpO2   99.4 °F (37.4 °C) 112 20 141/81 98 %      Temp src Heart Rate Source Patient Position BP Location FiO2 (%)   Tympanic -- -- -- --       Physical Exam   Constitutional: She is oriented to person, place, and time and well-developed, well-nourished, and in no distress.   HENT:   Nose: No epistaxis (minimal dry blood, no active bleeding).   Eyes: EOM are normal.   Neck: Normal range of motion. Neck supple.   Cardiovascular: Normal rate and regular rhythm.  Exam reveals no gallop.    No murmur heard.  Pulmonary/Chest: Effort normal and breath sounds normal. No respiratory distress.   Abdominal: Bowel sounds are normal. She exhibits no distension and no mass. There is tenderness (chronic, unchanged).   Musculoskeletal: Normal range of motion.   Neurological: She is alert and oriented to person, place, and time. She has normal sensation and normal strength.   Skin: Skin is warm and dry.   Psychiatric: Affect normal.   Nursing note and vitals reviewed.      LAB RESULTS  Lab Results (last 24 hours)     Procedure Component Value Units Date/Time    CBC Auto Differential [[id number]] Updated:  05/16/18 2038    Specimen:  Blood     CBC & Differential [[id number]] Collected:  05/16/18 1943    Specimen:  Blood Updated:  05/16/18 2038    Narrative:       The following orders were created for panel order CBC & Differential.  Procedure                               Abnormality         Status                     ---------                                -----------         ------                     Scan Slide[[id number]]                                                                  CBC Auto Differential[[id number]]                                                         Please view results for these tests on the individual orders.    Comprehensive Metabolic Panel [[id number]]  (Abnormal) Collected:  05/16/18 1943    Specimen:  Blood Updated:  05/16/18 2022     Glucose 347 (H) mg/dL      BUN 6 mg/dL      Creatinine 0.77 mg/dL      Sodium 140 mmol/L      Potassium 4.3 mmol/L      Chloride 101 mmol/L      CO2 24.4 mmol/L      Calcium 8.7 mg/dL      Total Protein 6.8 g/dL      Albumin 3.40 (L) g/dL      ALT (SGPT) 23 U/L      AST (SGOT) 45 (H) U/L      Alkaline Phosphatase 125 (H) U/L      Total Bilirubin 0.8 mg/dL      eGFR Non African Amer 78 mL/min/1.73      Globulin 3.4 gm/dL      A/G Ratio 1.0 g/dL      BUN/Creatinine Ratio 7.8     Anion Gap 14.6 mmol/L     Protime-INR [[id number]]  (Abnormal) Collected:  05/16/18 1943    Specimen:  Blood Updated:  05/16/18 2025     Protime 15.6 (H) Seconds      INR 1.26 (H)    aPTT [[id number]]  (Normal) Collected:  05/16/18 1943    Specimen:  Blood Updated:  05/16/18 2025     PTT 30.2 seconds     Fibrinogen [[id number]] Collected:  05/16/18 1943    Specimen:  Blood Updated:  05/16/18 2313    CBC & Differential [[id number]] Collected:  05/16/18 2042    Specimen:  Blood Updated:  05/16/18 2140    Narrative:       The following orders were created for panel order CBC & Differential.  Procedure                               Abnormality         Status                     ---------                               -----------         ------                     Scan Slide[[id number]]                   Normal              Final result               CBC Auto Differential[[id number]]        Abnormal            Final result                 Please view results for these tests on the individual orders.    CBC Auto Differential [[id number]]   (Abnormal) Collected:  05/16/18 2042    Specimen:  Blood Updated:  05/16/18 2140     WBC 2.36 (L) 10*3/mm3      RBC 4.11 10*6/mm3      Hemoglobin 11.4 (L) g/dL      Hematocrit 35.6 %      MCV 86.6 fL      MCH 27.7 pg      MCHC 32.0 (L) g/dL      RDW 15.8 (H) %      RDW-SD 50.2 fl      MPV -- fL      Comment: .         Platelets 6 (C) 10*3/mm3      Neutrophil % 62.3 %      Lymphocyte % 23.3 %      Monocyte % 8.9 %      Eosinophil % 4.7 %      Basophil % 0.8 %      Immature Grans % 0.4 %      Neutrophils, Absolute 1.47 (L) 10*3/mm3      Lymphocytes, Absolute 0.55 (L) 10*3/mm3      Monocytes, Absolute 0.21 10*3/mm3      Eosinophils, Absolute 0.11 10*3/mm3      Basophils, Absolute 0.02 10*3/mm3      Immature Grans, Absolute 0.01 10*3/mm3      nRBC 0.0 /100 WBC     Scan Slide [[id number]]  (Normal) Collected:  05/16/18 2042    Specimen:  Blood Updated:  05/16/18 2140     RBC Morphology Normal     WBC Morphology Normal     Platelet Morphology Normal    Immature Platelet Fraction [[id number]] Collected:  05/16/18 2042    Specimen:  Blood Updated:  05/16/18 2313        I ordered the above labs and reviewed the results    PROCEDURES  Procedures      PROGRESS AND CONSULTS      1919: Ordered blood work, aPTT, protime-INR for further evaluation.     1920: Notified pt of plan to order labs and imaging for further evaluation.     2036: Ordered CBC for further evaluation.     2106: Ordered Zofran.     2147: Placed call to [facility] for admission.     2147: Ordered platelets for pt's low platelet count.     2153: Pt rechecked. Pt resting comfortably and in no acute distress. Notified pt of lab results including low platelet count. Notified pt of plan to administer platelets and admit pt. Pt understands and agrees with the plan, all questions answered.    2157: Placed call to hematology for consult.     2220: Discussed pt's case with Dr. Almanza, [facility], who agrees to admit pt.    2238: Discussed pt's case with Dr. Rajput, hematology, who  requests a repeat CBC.     2242: Ordered repeat CBC per Dr. Rajput's request.     2300: Discussed pt;s case with Dr. Rajput, hematology, who states to cease platelet transfusion and start tylenol and  Benadryl for pt's fever. States after 30 minutes or after pt's fever ceases, pt can be started back on platelet transfusion.    MEDICAL DECISION MAKING  Results were reviewed/discussed with the patient and they were also made aware of online access. Pt also made aware that some labs, such as cultures, will not be resulted during ER visit and follow up with PMD is necessary.     MDM  Number of Diagnoses or Management Options  Nosebleed:   Thrombocytopenia:   Diagnosis management comments: Pt presents complaining of intermittent epistaxis episodes which first occurred four days ago. Upon arrival, pt is not having any active bleeding. Pt is not on any anticoagulants.Testing revealed that pt's platelet count is 6,000 today and was 107,000 six days ago. Pt will be admitted for thrombocytopenia and epistaxis. Pt will receive platelets upon admission.        Amount and/or Complexity of Data Reviewed  Clinical lab tests: ordered and reviewed (Platelets=6  Protime= 15.6  INR=1.26  WBC=3.36)  Decide to obtain previous medical records or to obtain history from someone other than the patient: yes  Discuss the patient with other providers: yes (Dr. Almanza, A  Dr. Rajput, hematology)  Independent visualization of images, tracings, or specimens: yes    Patient Progress  Patient progress: stable         DIAGNOSIS  Final diagnoses:   Thrombocytopenia   Nosebleed       DISPOSITION  ADMISSION    Discussed treatment plan and reason for admission with pt/family and admitting physician.  Pt/family voiced understanding of the plan for admission for further testing/treatment as needed.     Latest Documented Vital Signs:  As of 11:16 PM  BP- 114/71 HR- 103 Temp- 100.4 °F (38 °C) O2 sat- 98%  --    Documentation assistance provided by tori  Juan J Martell for Dr. Moore.  Information recorded by the scribe was done at my direction and has been verified and validated by me.           Juan J Martell  05/16/18 6028       Mauro Moore MD  05/16/18 7162

## 2018-05-17 ENCOUNTER — APPOINTMENT (OUTPATIENT)
Dept: CT IMAGING | Facility: HOSPITAL | Age: 58
End: 2018-05-17
Attending: INTERNAL MEDICINE

## 2018-05-17 PROBLEM — I85.11 SECONDARY ESOPHAGEAL VARICES WITH BLEEDING (HCC): Status: RESOLVED | Noted: 2017-03-07 | Resolved: 2018-05-17

## 2018-05-17 PROBLEM — R50.9 FEVER: Status: ACTIVE | Noted: 2018-05-17

## 2018-05-17 PROBLEM — K92.0 HEMATEMESIS: Status: ACTIVE | Noted: 2017-02-15

## 2018-05-17 LAB
ABO + RH BLD: NORMAL
ABO + RH BLD: NORMAL
ANION GAP SERPL CALCULATED.3IONS-SCNC: 12.3 MMOL/L
BASOPHILS # BLD AUTO: 0.02 10*3/MM3 (ref 0–0.2)
BASOPHILS # BLD AUTO: 0.03 10*3/MM3 (ref 0–0.2)
BASOPHILS NFR BLD AUTO: 0.7 % (ref 0–1.5)
BASOPHILS NFR BLD AUTO: 1 % (ref 0–1.5)
BH BB BLOOD EXPIRATION DATE: NORMAL
BH BB BLOOD EXPIRATION DATE: NORMAL
BH BB BLOOD TYPE BARCODE: 6200
BH BB BLOOD TYPE BARCODE: 6200
BH BB DISPENSE STATUS: NORMAL
BH BB DISPENSE STATUS: NORMAL
BH BB PRODUCT CODE: NORMAL
BH BB PRODUCT CODE: NORMAL
BH BB UNIT NUMBER: NORMAL
BH BB UNIT NUMBER: NORMAL
BUN BLD-MCNC: 7 MG/DL (ref 6–20)
BUN/CREAT SERPL: 9.7 (ref 7–25)
CALCIUM SPEC-SCNC: 9 MG/DL (ref 8.6–10.5)
CHLORIDE SERPL-SCNC: 102 MMOL/L (ref 98–107)
CHROMATIN AB SERPL-ACNC: 20.9 IU/ML (ref 0–14)
CO2 SERPL-SCNC: 24.7 MMOL/L (ref 22–29)
CREAT BLD-MCNC: 0.72 MG/DL (ref 0.57–1)
CRP SERPL-MCNC: 0.42 MG/DL (ref 0–0.5)
DEPRECATED RDW RBC AUTO: 50.1 FL (ref 37–54)
DEPRECATED RDW RBC AUTO: 50.4 FL (ref 37–54)
EOSINOPHIL # BLD AUTO: 0.09 10*3/MM3 (ref 0–0.7)
EOSINOPHIL # BLD AUTO: 0.16 10*3/MM3 (ref 0–0.7)
EOSINOPHIL NFR BLD AUTO: 3.2 % (ref 0.3–6.2)
EOSINOPHIL NFR BLD AUTO: 5.2 % (ref 0.3–6.2)
ERYTHROCYTE [DISTWIDTH] IN BLOOD BY AUTOMATED COUNT: 15.9 % (ref 11.7–13)
ERYTHROCYTE [DISTWIDTH] IN BLOOD BY AUTOMATED COUNT: 16 % (ref 11.7–13)
ERYTHROCYTE [SEDIMENTATION RATE] IN BLOOD: 44 MM/HR (ref 0–30)
FOLATE SERPL-MCNC: 19.22 NG/ML (ref 4.78–24.2)
GFR SERPL CREATININE-BSD FRML MDRD: 84 ML/MIN/1.73
GLUCOSE BLD-MCNC: 207 MG/DL (ref 65–99)
GLUCOSE BLDC GLUCOMTR-MCNC: 207 MG/DL (ref 70–130)
GLUCOSE BLDC GLUCOMTR-MCNC: 303 MG/DL (ref 70–130)
GLUCOSE BLDC GLUCOMTR-MCNC: 347 MG/DL (ref 70–130)
GLUCOSE BLDC GLUCOMTR-MCNC: 437 MG/DL (ref 70–130)
GLUCOSE BLDC GLUCOMTR-MCNC: 493 MG/DL (ref 70–130)
HCT VFR BLD AUTO: 32.7 % (ref 35.6–45.5)
HCT VFR BLD AUTO: 33.4 % (ref 35.6–45.5)
HGB BLD-MCNC: 10.4 G/DL (ref 11.9–15.5)
HGB BLD-MCNC: 10.7 G/DL (ref 11.9–15.5)
HIV1 P24 AG SER QL: NORMAL
HIV1+2 AB SER QL: NORMAL
IMM GRANULOCYTES # BLD: 0.01 10*3/MM3 (ref 0–0.03)
IMM GRANULOCYTES # BLD: 0.01 10*3/MM3 (ref 0–0.03)
IMM GRANULOCYTES NFR BLD: 0.3 % (ref 0–0.5)
IMM GRANULOCYTES NFR BLD: 0.4 % (ref 0–0.5)
LDH SERPL-CCNC: 218 U/L (ref 135–214)
LYMPHOCYTES # BLD AUTO: 0.63 10*3/MM3 (ref 0.9–4.8)
LYMPHOCYTES # BLD AUTO: 0.79 10*3/MM3 (ref 0.9–4.8)
LYMPHOCYTES NFR BLD AUTO: 22.7 % (ref 19.6–45.3)
LYMPHOCYTES NFR BLD AUTO: 25.6 % (ref 19.6–45.3)
MCH RBC QN AUTO: 27.5 PG (ref 26.9–32)
MCH RBC QN AUTO: 27.6 PG (ref 26.9–32)
MCHC RBC AUTO-ENTMCNC: 31.8 G/DL (ref 32.4–36.3)
MCHC RBC AUTO-ENTMCNC: 32 G/DL (ref 32.4–36.3)
MCV RBC AUTO: 86.3 FL (ref 80.5–98.2)
MCV RBC AUTO: 86.5 FL (ref 80.5–98.2)
MONOCYTES # BLD AUTO: 0.27 10*3/MM3 (ref 0.2–1.2)
MONOCYTES # BLD AUTO: 0.32 10*3/MM3 (ref 0.2–1.2)
MONOCYTES NFR BLD AUTO: 11.5 % (ref 5–12)
MONOCYTES NFR BLD AUTO: 8.7 % (ref 5–12)
NEUTROPHILS # BLD AUTO: 1.72 10*3/MM3 (ref 1.9–8.1)
NEUTROPHILS # BLD AUTO: 1.84 10*3/MM3 (ref 1.9–8.1)
NEUTROPHILS NFR BLD AUTO: 59.5 % (ref 42.7–76)
NEUTROPHILS NFR BLD AUTO: 61.9 % (ref 42.7–76)
NRBC BLD MANUAL-RTO: 0 /100 WBC (ref 0–0)
NRBC BLD MANUAL-RTO: 0 /100 WBC (ref 0–0)
PLATELET # BLD AUTO: 4 10*3/MM3 (ref 140–500)
PLATELET # BLD AUTO: 8 10*3/MM3 (ref 140–500)
PMV BLD AUTO: ABNORMAL FL (ref 6–12)
PMV BLD AUTO: ABNORMAL FL (ref 6–12)
POTASSIUM BLD-SCNC: 4.1 MMOL/L (ref 3.5–5.2)
RBC # BLD AUTO: 3.78 10*6/MM3 (ref 3.9–5.2)
RBC # BLD AUTO: 3.87 10*6/MM3 (ref 3.9–5.2)
SODIUM BLD-SCNC: 139 MMOL/L (ref 136–145)
TROPONIN T SERPL-MCNC: <0.01 NG/ML (ref 0–0.03)
TSPOT INTERPRETATION: NORMAL
TSPOT NIL CONTROL INTERPRETATION: NORMAL
TSPOT PANEL A: 0
TSPOT PANEL B: 0
TSPOT POS CONTROL INTERPRETATION: NORMAL
UNIT  ABO: NORMAL
UNIT  ABO: NORMAL
UNIT  RH: NORMAL
UNIT  RH: NORMAL
VIT B12 BLD-MCNC: 939 PG/ML (ref 211–946)
WBC NRBC COR # BLD: 2.78 10*3/MM3 (ref 4.5–10.7)
WBC NRBC COR # BLD: 3.09 10*3/MM3 (ref 4.5–10.7)

## 2018-05-17 PROCEDURE — 63710000001 PREDNISONE PER 1 MG: Performed by: INTERNAL MEDICINE

## 2018-05-17 PROCEDURE — 99254 IP/OBS CNSLTJ NEW/EST MOD 60: CPT | Performed by: INTERNAL MEDICINE

## 2018-05-17 PROCEDURE — 86235 NUCLEAR ANTIGEN ANTIBODY: CPT | Performed by: INTERNAL MEDICINE

## 2018-05-17 PROCEDURE — 63710000001 PREDNISONE PER 5 MG: Performed by: INTERNAL MEDICINE

## 2018-05-17 PROCEDURE — 87040 BLOOD CULTURE FOR BACTERIA: CPT | Performed by: HOSPITALIST

## 2018-05-17 PROCEDURE — 71250 CT THORAX DX C-: CPT

## 2018-05-17 PROCEDURE — 86140 C-REACTIVE PROTEIN: CPT | Performed by: HOSPITALIST

## 2018-05-17 PROCEDURE — 86225 DNA ANTIBODY NATIVE: CPT | Performed by: INTERNAL MEDICINE

## 2018-05-17 PROCEDURE — 80048 BASIC METABOLIC PNL TOTAL CA: CPT | Performed by: HOSPITALIST

## 2018-05-17 PROCEDURE — 63710000001 PROMETHAZINE PER 25 MG: Performed by: HOSPITALIST

## 2018-05-17 PROCEDURE — G0432 EIA HIV-1/HIV-2 SCREEN: HCPCS | Performed by: HOSPITALIST

## 2018-05-17 PROCEDURE — 84484 ASSAY OF TROPONIN QUANT: CPT | Performed by: HOSPITALIST

## 2018-05-17 PROCEDURE — 82962 GLUCOSE BLOOD TEST: CPT

## 2018-05-17 PROCEDURE — 87899 AGENT NOS ASSAY W/OPTIC: CPT | Performed by: INTERNAL MEDICINE

## 2018-05-17 PROCEDURE — 87899 AGENT NOS ASSAY W/OPTIC: CPT | Performed by: HOSPITALIST

## 2018-05-17 PROCEDURE — 99223 1ST HOSP IP/OBS HIGH 75: CPT | Performed by: INTERNAL MEDICINE

## 2018-05-17 PROCEDURE — 82607 VITAMIN B-12: CPT | Performed by: INTERNAL MEDICINE

## 2018-05-17 PROCEDURE — 86481 TB AG RESPONSE T-CELL SUSP: CPT | Performed by: HOSPITALIST

## 2018-05-17 PROCEDURE — 85025 COMPLETE CBC W/AUTO DIFF WBC: CPT | Performed by: INTERNAL MEDICINE

## 2018-05-17 PROCEDURE — 25010000002 IMMUNE GLOBULIN (HUMAN) 30 GM/300ML SOLUTION: Performed by: INTERNAL MEDICINE

## 2018-05-17 PROCEDURE — 36415 COLL VENOUS BLD VENIPUNCTURE: CPT | Performed by: HOSPITALIST

## 2018-05-17 PROCEDURE — 63710000001 INSULIN ASPART PER 5 UNITS: Performed by: INTERNAL MEDICINE

## 2018-05-17 PROCEDURE — 63710000001 DIPHENHYDRAMINE PER 50 MG: Performed by: INTERNAL MEDICINE

## 2018-05-17 PROCEDURE — 99255 IP/OBS CONSLTJ NEW/EST HI 80: CPT | Performed by: INTERNAL MEDICINE

## 2018-05-17 PROCEDURE — 63710000001 INSULIN ASPART PER 5 UNITS: Performed by: HOSPITALIST

## 2018-05-17 PROCEDURE — 63710000001 INSULIN DETEMER PER 5 UNITS: Performed by: INTERNAL MEDICINE

## 2018-05-17 PROCEDURE — 85652 RBC SED RATE AUTOMATED: CPT | Performed by: HOSPITALIST

## 2018-05-17 PROCEDURE — 83615 LACTATE (LD) (LDH) ENZYME: CPT | Performed by: INTERNAL MEDICINE

## 2018-05-17 PROCEDURE — 82746 ASSAY OF FOLIC ACID SERUM: CPT | Performed by: INTERNAL MEDICINE

## 2018-05-17 RX ORDER — OXYCODONE HYDROCHLORIDE 5 MG/1
5 TABLET ORAL EVERY 6 HOURS PRN
Status: DISCONTINUED | OUTPATIENT
Start: 2018-05-17 | End: 2018-05-23 | Stop reason: HOSPADM

## 2018-05-17 RX ORDER — HYDROXYZINE HYDROCHLORIDE 25 MG/1
25 TABLET, FILM COATED ORAL 3 TIMES DAILY PRN
Status: DISCONTINUED | OUTPATIENT
Start: 2018-05-17 | End: 2018-05-23 | Stop reason: HOSPADM

## 2018-05-17 RX ORDER — HYDROXYZINE HYDROCHLORIDE 25 MG/1
25 TABLET, FILM COATED ORAL 3 TIMES DAILY PRN
Status: DISCONTINUED | OUTPATIENT
Start: 2018-05-17 | End: 2018-05-17 | Stop reason: SDUPTHER

## 2018-05-17 RX ORDER — BUPROPION HYDROCHLORIDE 150 MG/1
150 TABLET ORAL DAILY
Status: DISCONTINUED | OUTPATIENT
Start: 2018-05-17 | End: 2018-05-23 | Stop reason: HOSPADM

## 2018-05-17 RX ORDER — ALPRAZOLAM 0.5 MG/1
0.5 TABLET ORAL 2 TIMES DAILY PRN
Status: DISCONTINUED | OUTPATIENT
Start: 2018-05-17 | End: 2018-05-17

## 2018-05-17 RX ORDER — SPIRONOLACTONE 100 MG/1
100 TABLET, FILM COATED ORAL DAILY
Status: DISCONTINUED | OUTPATIENT
Start: 2018-05-17 | End: 2018-05-23 | Stop reason: HOSPADM

## 2018-05-17 RX ORDER — NICOTINE POLACRILEX 4 MG
15 LOZENGE BUCCAL
Status: DISCONTINUED | OUTPATIENT
Start: 2018-05-17 | End: 2018-05-17

## 2018-05-17 RX ORDER — CLOTRIMAZOLE 10 MG/1
10 LOZENGE ORAL; TOPICAL
Status: DISCONTINUED | OUTPATIENT
Start: 2018-05-17 | End: 2018-05-23 | Stop reason: HOSPADM

## 2018-05-17 RX ORDER — DIPHENHYDRAMINE HCL 25 MG
25 CAPSULE ORAL DAILY
Status: COMPLETED | OUTPATIENT
Start: 2018-05-17 | End: 2018-05-22

## 2018-05-17 RX ORDER — PROMETHAZINE HYDROCHLORIDE 25 MG/1
25 TABLET ORAL EVERY 6 HOURS PRN
Status: DISCONTINUED | OUTPATIENT
Start: 2018-05-17 | End: 2018-05-23 | Stop reason: HOSPADM

## 2018-05-17 RX ORDER — PREGABALIN 75 MG/1
75 CAPSULE ORAL 2 TIMES DAILY
Status: DISCONTINUED | OUTPATIENT
Start: 2018-05-17 | End: 2018-05-23 | Stop reason: HOSPADM

## 2018-05-17 RX ORDER — FOLIC ACID 1 MG/1
1 TABLET ORAL DAILY
Status: DISCONTINUED | OUTPATIENT
Start: 2018-05-17 | End: 2018-05-23 | Stop reason: HOSPADM

## 2018-05-17 RX ORDER — ALPRAZOLAM 0.5 MG/1
0.5 TABLET ORAL 2 TIMES DAILY PRN
Status: DISCONTINUED | OUTPATIENT
Start: 2018-05-17 | End: 2018-05-23 | Stop reason: HOSPADM

## 2018-05-17 RX ORDER — HYDROXYZINE HYDROCHLORIDE 25 MG/1
25 TABLET, FILM COATED ORAL 3 TIMES DAILY PRN
Status: DISCONTINUED | OUTPATIENT
Start: 2018-05-17 | End: 2018-05-17

## 2018-05-17 RX ORDER — FERROUS SULFATE 325(65) MG
325 TABLET ORAL 2 TIMES DAILY
Status: DISCONTINUED | OUTPATIENT
Start: 2018-05-17 | End: 2018-05-23 | Stop reason: HOSPADM

## 2018-05-17 RX ORDER — PANTOPRAZOLE SODIUM 40 MG/1
40 TABLET, DELAYED RELEASE ORAL
Status: DISCONTINUED | OUTPATIENT
Start: 2018-05-17 | End: 2018-05-17

## 2018-05-17 RX ORDER — CYCLOBENZAPRINE HCL 10 MG
5 TABLET ORAL 3 TIMES DAILY PRN
Status: DISCONTINUED | OUTPATIENT
Start: 2018-05-17 | End: 2018-05-23 | Stop reason: HOSPADM

## 2018-05-17 RX ORDER — SUCRALFATE 1 G/1
1 TABLET ORAL 2 TIMES DAILY
Status: DISCONTINUED | OUTPATIENT
Start: 2018-05-17 | End: 2018-05-23 | Stop reason: HOSPADM

## 2018-05-17 RX ORDER — LEVETIRACETAM 500 MG/1
500 TABLET ORAL 2 TIMES DAILY
Status: DISCONTINUED | OUTPATIENT
Start: 2018-05-17 | End: 2018-05-23 | Stop reason: HOSPADM

## 2018-05-17 RX ORDER — CYCLOBENZAPRINE HCL 5 MG
5 TABLET ORAL 3 TIMES DAILY PRN
Status: DISCONTINUED | OUTPATIENT
Start: 2018-05-17 | End: 2018-05-17

## 2018-05-17 RX ORDER — ACETAMINOPHEN 325 MG/1
650 TABLET ORAL DAILY
Status: COMPLETED | OUTPATIENT
Start: 2018-05-17 | End: 2018-05-21

## 2018-05-17 RX ORDER — CYCLOSPORINE 0.5 MG/ML
1 EMULSION OPHTHALMIC 2 TIMES DAILY
Status: DISCONTINUED | OUTPATIENT
Start: 2018-05-17 | End: 2018-05-23 | Stop reason: HOSPADM

## 2018-05-17 RX ORDER — PREDNISONE 20 MG/1
60 TABLET ORAL
Status: DISCONTINUED | OUTPATIENT
Start: 2018-05-17 | End: 2018-05-17

## 2018-05-17 RX ORDER — DEXTROSE MONOHYDRATE 25 G/50ML
25 INJECTION, SOLUTION INTRAVENOUS
Status: DISCONTINUED | OUTPATIENT
Start: 2018-05-17 | End: 2018-05-17

## 2018-05-17 RX ADMIN — RIFAXIMIN 550 MG: 550 TABLET ORAL at 22:41

## 2018-05-17 RX ADMIN — PANTOPRAZOLE SODIUM 40 MG: 40 INJECTION, POWDER, FOR SOLUTION INTRAVENOUS at 05:05

## 2018-05-17 RX ADMIN — TRIMETHOBENZAMIDE HYDROCHLORIDE 300 MG: 300 CAPSULE ORAL at 09:06

## 2018-05-17 RX ADMIN — PREGABALIN 75 MG: 75 CAPSULE ORAL at 09:07

## 2018-05-17 RX ADMIN — ACETAMINOPHEN 650 MG: 325 TABLET, FILM COATED ORAL at 14:00

## 2018-05-17 RX ADMIN — ALPRAZOLAM 0.5 MG: 0.5 TABLET ORAL at 22:49

## 2018-05-17 RX ADMIN — OXYCODONE HYDROCHLORIDE 5 MG: 5 TABLET ORAL at 11:05

## 2018-05-17 RX ADMIN — FERROUS SULFATE TAB 325 MG (65 MG ELEMENTAL FE) 325 MG: 325 (65 FE) TAB at 09:06

## 2018-05-17 RX ADMIN — ALPRAZOLAM 0.5 MG: 0.5 TABLET ORAL at 05:30

## 2018-05-17 RX ADMIN — INSULIN ASPART 10 UNITS: 100 INJECTION, SOLUTION INTRAVENOUS; SUBCUTANEOUS at 09:14

## 2018-05-17 RX ADMIN — TRIMETHOBENZAMIDE HYDROCHLORIDE 300 MG: 300 CAPSULE ORAL at 17:12

## 2018-05-17 RX ADMIN — TRIMETHOBENZAMIDE HYDROCHLORIDE 300 MG: 300 CAPSULE ORAL at 22:41

## 2018-05-17 RX ADMIN — CLOTRIMAZOLE 10 MG: 10 LOZENGE ORAL at 17:16

## 2018-05-17 RX ADMIN — DULOXETINE HYDROCHLORIDE 90 MG: 60 CAPSULE, DELAYED RELEASE ORAL at 09:07

## 2018-05-17 RX ADMIN — PREDNISONE 15 MG: 5 TABLET ORAL at 17:11

## 2018-05-17 RX ADMIN — INSULIN DETEMIR 50 UNITS: 100 INJECTION, SOLUTION SUBCUTANEOUS at 18:11

## 2018-05-17 RX ADMIN — RIFAXIMIN 550 MG: 550 TABLET ORAL at 09:06

## 2018-05-17 RX ADMIN — PROMETHAZINE HYDROCHLORIDE 25 MG: 25 TABLET ORAL at 11:05

## 2018-05-17 RX ADMIN — LEVETIRACETAM 500 MG: 500 TABLET, FILM COATED ORAL at 09:06

## 2018-05-17 RX ADMIN — CLOTRIMAZOLE 10 MG: 10 LOZENGE ORAL at 11:40

## 2018-05-17 RX ADMIN — FOLIC ACID 1 MG: 1 TABLET ORAL at 09:06

## 2018-05-17 RX ADMIN — INSULIN DETEMIR 30 UNITS: 100 INJECTION, SOLUTION SUBCUTANEOUS at 22:48

## 2018-05-17 RX ADMIN — CYCLOBENZAPRINE 5 MG: 10 TABLET, FILM COATED ORAL at 01:50

## 2018-05-17 RX ADMIN — CLOTRIMAZOLE 10 MG: 10 LOZENGE ORAL at 14:01

## 2018-05-17 RX ADMIN — SUCRALFATE 1 G: 1 TABLET ORAL at 22:41

## 2018-05-17 RX ADMIN — INSULIN ASPART 10 UNITS: 100 INJECTION, SOLUTION INTRAVENOUS; SUBCUTANEOUS at 22:51

## 2018-05-17 RX ADMIN — LEVETIRACETAM 500 MG: 500 TABLET, FILM COATED ORAL at 22:40

## 2018-05-17 RX ADMIN — PANTOPRAZOLE SODIUM 40 MG: 40 INJECTION, POWDER, FOR SOLUTION INTRAVENOUS at 22:42

## 2018-05-17 RX ADMIN — PREDNISONE 60 MG: 20 TABLET ORAL at 05:06

## 2018-05-17 RX ADMIN — SUCRALFATE 1 G: 1 TABLET ORAL at 09:06

## 2018-05-17 RX ADMIN — HYDROCODONE BITARTRATE AND ACETAMINOPHEN 1 TABLET: 5; 325 TABLET ORAL at 01:49

## 2018-05-17 RX ADMIN — BUPROPION HYDROCHLORIDE 150 MG: 150 TABLET, EXTENDED RELEASE ORAL at 09:07

## 2018-05-17 RX ADMIN — FERROUS SULFATE TAB 325 MG (65 MG ELEMENTAL FE) 325 MG: 325 (65 FE) TAB at 22:41

## 2018-05-17 RX ADMIN — CLOTRIMAZOLE 10 MG: 10 LOZENGE ORAL at 09:06

## 2018-05-17 RX ADMIN — CYCLOSPORINE 1 DROP: 0.5 EMULSION OPHTHALMIC at 22:42

## 2018-05-17 RX ADMIN — INSULIN ASPART 14 UNITS: 100 INJECTION, SOLUTION INTRAVENOUS; SUBCUTANEOUS at 11:32

## 2018-05-17 RX ADMIN — OXYCODONE HYDROCHLORIDE 5 MG: 5 TABLET ORAL at 19:58

## 2018-05-17 RX ADMIN — IMMUNE GLOBULIN INFUSION (HUMAN) 30 G: 100 INJECTION, SOLUTION INTRAVENOUS; SUBCUTANEOUS at 15:17

## 2018-05-17 RX ADMIN — CLOTRIMAZOLE 10 MG: 10 LOZENGE ORAL at 22:42

## 2018-05-17 RX ADMIN — PREGABALIN 75 MG: 75 CAPSULE ORAL at 22:43

## 2018-05-17 RX ADMIN — DIPHENHYDRAMINE HYDROCHLORIDE 25 MG: 25 CAPSULE ORAL at 14:00

## 2018-05-17 RX ADMIN — INSULIN ASPART 14 UNITS: 100 INJECTION, SOLUTION INTRAVENOUS; SUBCUTANEOUS at 17:12

## 2018-05-17 RX ADMIN — CYCLOSPORINE 1 DROP: 0.5 EMULSION OPHTHALMIC at 09:07

## 2018-05-17 RX ADMIN — SPIRONOLACTONE 100 MG: 100 TABLET ORAL at 09:07

## 2018-05-17 RX ADMIN — INSULIN ASPART 18 UNITS: 100 INJECTION, SOLUTION INTRAVENOUS; SUBCUTANEOUS at 17:14

## 2018-05-17 RX ADMIN — PROMETHAZINE HYDROCHLORIDE 25 MG: 25 TABLET ORAL at 19:58

## 2018-05-17 NOTE — H&P
Patient Care Team:  Blanca Pitts MD as PCP - General (Internal Medicine)  Sukhdeep Mcdowell MD as Consulting Physician (Hematology and Oncology)  Blanca Pitts MD as Referring Physician (Internal Medicine)    Chief complaint: Epistaxis    Subjective     History of Present Illness  58yo liver cirrhosis, rheumatoid arthritis, thrombocytopenia, lupus, type 2 diabetes, previous GI bleed from esophageal varices who comes a hospital because 3 days prior to hospitalization she had severe nosebleeds.  She was able to get nosebleeds under control.  And then on the day of admission the patient was vomiting blood.  The patient's unsure if the blood that she now vomiting coming from swallowing blood coming from her nose or if it's coming from a GI source.  The patient has had no more vomiting of blood in the emergency room.  The patient was found to have platelets of 6.  The patient was transfused a unit of platelets in the emergency room.  The oncology team is artery been consult on the patient.    Patient also complains of fevers and chills up to 101 over the last couple months.  The patient is trying to get to a 48 hour time period fever free so that she can start back on her Enbrel.    Patient has a history of seizures or last seizures was many years ago.  Her seizures started in her adult life.  Patient's currently taking Keppra and well controlled on this.    Patient takes Atarax for itching presumably related to the patient's cirrhosis from Wong.          Review of Systems   Constitutional: Positive for appetite change (   because of nose), chills and fever. Negative for activity change.   HENT: Positive for dental problem. Negative for postnasal drip and rhinorrhea.    Respiratory: Negative for apnea and shortness of breath.    Cardiovascular: Positive for chest pain ( epigastric, moderate, today and lasted several hours). Negative for palpitations.   Gastrointestinal: Positive for abdominal distention and  abdominal pain.   Endocrine: Positive for cold intolerance. Negative for heat intolerance and polydipsia.   Genitourinary: Negative for difficulty urinating and dysuria.   Musculoskeletal: Positive for arthralgias. Negative for back pain.   Skin: Positive for rash. Negative for color change and pallor.   Neurological: Positive for numbness ( on lyrica for this). Negative for dizziness.   Hematological: Negative for adenopathy.   Psychiatric/Behavioral: Negative for agitation and confusion.        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   • Cirrhosis    • Colon polyps    • Deafness    • Depression    • Diabetes mellitus     Adult onset, insulin requiring   • Duodenal ulcer with hemorrhage    • Esophageal varices determined by endoscopy    • Fibromyalgia    • Gallbladder disease    • Gastric varices    • Gastroparesis    • Glaucoma    • Granuloma annulare    • H/O B12 deficiency    • H/O Bleeding ulcer     And gastroesophageal varices   • H/O mixed connective tissue disorder    • Hemorrhoids    • Hiatal hernia    • History of transfusion    • Hx of being hospitalized     In [place] for GI bleeding from ulcer and varices   • Hyperlipidemia    • Migraine    • DUKES (nonalcoholic steatohepatitis) 11/2016   • BRICE (obstructive sleep apnea)    • Pancreas divisum    • Pancytopenia     Chronic, due to cirrhosis and hypersplenism   • Peptic ulcer disease     And esophageal varices   • PONV (postoperative nausea and vomiting)    • RA (rheumatoid arthritis)    • Seizure disorder     LAST ONE SEVERAL YEARS AGO   • Seizures    • Systemic lupus      Past Surgical History:   Procedure Laterality Date   • BLADDER REPAIR  1991   • CATARACT EXTRACTION     • CHOLECYSTECTOMY  1994   • ENDOSCOPY N/A 11/7/2016    Procedure: ESOPHAGOGASTRODUODENOSCOPY WITH  "COLD POLYPECTOMY, BANDING,  AND CLIPS TIMES 2;  Surgeon: Rich Coleman MD;  [facility];  Service:    • ENDOSCOPY N/A 12/22/2016    Procedure: ESOPHAGOGASTRODUODENOSCOPY WITH ESOPHAGEAL BANDING. 5 BANDS FIRED, 4 BANDS ADHERERD TO MUCOSA;  Surgeon: Rich Coleman MD;  Location: [facility] ENDOSCOPY;  Service:    • ENDOSCOPY N/A 2/15/2017    Procedure: ESOPHAGOGASTRODUODENOSCOPY AT BEDSIDE with esophageal varices banding (3);  Surgeon: Rich Coleman MD;  Location: [facility] ENDOSCOPY;  Service:    • ENDOSCOPY N/A 4/6/2017    Procedure: ESOPHAGOGASTRODUODENOSCOPY WITH ESOPHAGEAL VARICES BANDING x2;  Surgeon: Rich Coleman MD;  [facility] ENDOSCOPY;  Service:    • ENDOSCOPY N/A 11/24/2017    Procedure: ESOPHAGOGASTRODUODENOSCOPY with biopsies;  Surgeon: Rich Coleman MD;  Location: [facility] ENDOSCOPY;  Service:    • ENDOSCOPY AND COLONOSCOPY  2014    Dr. Rich Coleman with findings of candida esophagitis   • EYE SURGERY     • HYSTERECTOMY  1986    partial   • JOINT REPLACEMENT     • KNEE SURGERY Right 1995    \"right knee recontstruction\"   • LIVER BIOPSY  01/2015   • MASS EXCISION     • STOMACH SURGERY       Family History   Problem Relation Age of Onset   • Liver disease Other    • Depression Other    • Heart disease Other    • Hypertension Other    • Diabetes Other    • Breast cancer Other    • Brain cancer Other    • Uterine cancer Other    • Colon cancer Other    • Leukemia Other    • Colon cancer Maternal Aunt    • Hypertension Mother    • Diabetes type II Mother    • Rheum arthritis Mother    • Liver disease Mother         DUKES   • Heart disease Father    • Diabetes type II Father    • Diabetes type II Sister    • Lupus Sister    • Malig Hyperthermia Neg Hx      Social History   Substance Use Topics   • Smoking status: Former Smoker     Packs/day: 0.00     Years: 0.00     Quit date: 12/17/2015   • Smokeless tobacco: Never Used   • Alcohol use No     Prescriptions Prior to Admission "   Medication Sig Dispense Refill Last Dose   • ALPRAZolam (XANAX) 0.5 MG tablet TAKE ONE TABLET BY MOUTH TWICE A DAY AS NEEDED FOR ANXIETY 60 tablet 2 Taking   • buPROPion XL (WELLBUTRIN XL) 150 MG 24 hr tablet TAKE ONE TABLET BY MOUTH DAILY 90 tablet 3 Taking   • cholecalciferol (VITAMIN D3) 1000 UNITS tablet Take 1,000 Units by mouth Daily.   Taking   • clotrimazole (MYCELEX) 10 MG omer Take 1 tablet by mouth 5 (Five) Times a Day. 70 tablet 1 Taking   • cycloSPORINE (RESTASIS) 0.05 % ophthalmic emulsion 1 drop 2 (two) times a day.   Taking   • DULoxetine (CYMBALTA) 30 MG capsule TAKE THREE CAPSULES BY MOUTH DAILY 90 capsule 3 Taking   • ferrous sulfate 325 (65 FE) MG tablet TAKE ONE TABLET BY MOUTH TWICE A DAY 60 tablet 1 Taking   • folic acid (FOLVITE) 1 MG tablet Take 1 mg by mouth Daily.   Taking   • furosemide (LASIX) 40 MG tablet Take 1 tablet by mouth Daily. 30 tablet 2    • hydrOXYzine (ATARAX) 25 MG tablet Take 25 mg by mouth 3 (Three) Times a Day As Needed for Itching.   Taking   • Insulin Pen Needle 31G X 5 MM misc To inject 5 -6 times per day 200 each 3 Taking   • Insulin Regular Human, Conc, (HUMULIN R U-500 KWIKPEN) 500 UNIT/ML solution pen-injector CONCENTRATED injection 25 units with each meal plus the sliding scale. Maximum of 50 units per meal. 5 pen 3 Taking   • levETIRAcetam (KEPPRA) 500 MG tablet TAKE ONE TABLET BY MOUTH TWICE A DAY 60 tablet 1 Taking   • LYRICA 75 MG capsule TAKE ONE CAPSULE BY MOUTH TWICE A DAY 60 capsule 0 Taking   • Multiple Vitamin (MULTI-VITAMIN PO) Take 1 tablet by mouth Daily.   Taking   • ONE TOUCH ULTRA TEST test strip USE FOR TESTING FOUR TIMES A  each 4 Taking   • OxyCODONE HCl (OXAYDO) 7.5 MG tablet  Take 7.5 mg by mouth Every 8 (Eight) Hours As Needed.   Taking   • pantoprazole (PROTONIX) 40 MG EC tablet TAKE ONE TABLET BY MOUTH DAILY 30 tablet 4 Taking   • promethazine (PHENERGAN) 25 MG tablet TAKE ONE TABLET BY MOUTH EVERY 6 HOURS AS NEEDED FOR NAUSEA  OR VOMITING 30 tablet 0 Taking   • RELISTOR 150 MG tablet 150 mg Daily.   Taking   • spironolactone (ALDACTONE) 100 MG tablet TAKE ONE TABLET BY MOUTH DAILY 30 tablet 4 Taking   • sucralfate (CARAFATE) 1 g tablet Take 1 tablet by mouth 2 (Two) Times a Day. 60 tablet 2    • trimethobenzamide (TIGAN) 300 MG capsule Take 300 mg by mouth 3 (Three) Times a Day.   Taking   • vitamin B-12 (CYANOCOBALAMIN) 1000 MCG tablet Take 1,000 mcg by mouth daily.   Taking   • XIFAXAN 550 MG tablet TAKE ONE TABLET BY MOUTH TWICE A DAY 60 tablet 4 Taking     Allergies:  Albuterol; Lactulose; Tramadol; and Quinine derivatives    Objective      Vital Signs  Temp:  [99.2 °F (37.3 °C)-99.4 °F (37.4 °C)] 99.2 °F (37.3 °C)  Heart Rate:  [106-112] 108  Resp:  [16-20] 16  BP: (135-143)/(73-83) 135/73    Physical Exam   Constitutional: She is oriented to person, place, and time. She appears well-developed and well-nourished.   HENT:   Head: Normocephalic and atraumatic.   Mouth/Throat: No oropharyngeal exudate.   Eyes: EOM are normal. Pupils are equal, round, and reactive to light.   Neck: Normal range of motion. Neck supple. No tracheal deviation present. No thyromegaly present.   Cardiovascular: Regular rhythm.  Exam reveals no gallop and no friction rub.    No murmur heard.  Mildly tachycardic   Pulmonary/Chest: Effort normal and breath sounds normal. No respiratory distress. She has no wheezes.   Abdominal: Soft. Bowel sounds are normal. She exhibits no distension. There is tenderness ( mild).   Musculoskeletal: Normal range of motion. She exhibits no edema or deformity.   Neurological: She is alert and oriented to person, place, and time.   Skin: Skin is warm and dry. Rash noted. No erythema. No pallor.   Psychiatric: She has a normal mood and affect. Her behavior is normal.       Results Review:   I reviewed the patient's new clinical results.  I reviewed the patient's new imaging results and agree with the interpretation.       Assessment/Plan     Principal Problem:    Thrombocytopenia  Active Problems:    Cirrhosis of liver    Rheumatoid arthritis    Anxiety and depression    Type 2 diabetes mellitus, uncontrolled, with neuropathy    Secondary esophageal varices without bleeding    Secondary esophageal varices with bleeding      Assessment & Plan      Thrombocytopenia  -transfuse  -hematology consult    GI bleed mostly impart to low platelets  -protonix      Cirrhosis of liver  -GI consult    Fevers recurring  -crp  -esr  -hiv  -blood cultures  -patient states already tested for hep b,c      Rheumatoid arthritis  -unable to take embrel since December because of fevers      Anxiety and depression      Type 2 diabetes mellitus, uncontrolled, with neuropathy  -ssi  -takes 40 units with meals and a ssi      Secondary esophageal varices with bleeding        I discussed the patients findings and my recommendations with patient, family and consulting provider    Jose Almanza MD  05/16/18  10:20 PM    Time:

## 2018-05-17 NOTE — PLAN OF CARE
Problem: Patient Care Overview  Goal: Plan of Care Review  Outcome: Ongoing (interventions implemented as appropriate)   05/17/18 0219   Coping/Psychosocial   Plan of Care Reviewed With patient   Plan of Care Review   Progress no change   OTHER   Outcome Summary Pre medicated for IVIG, given per orders. Dr. Burnett dosing insulin. Levemir 50 units given with SSI this evening. Levemir 30 units ordered for tonight. Dr. Burnett wants the 30 units of Levemir given unless blood glucose under 100. Monitor blood glucose, pain level and lab work.      Goal: Individualization and Mutuality  Outcome: Ongoing (interventions implemented as appropriate)    Goal: Discharge Needs Assessment  Outcome: Ongoing (interventions implemented as appropriate)      Problem: Fall Risk (Adult)  Goal: Identify Related Risk Factors and Signs and Symptoms  Outcome: Outcome(s) achieved Date Met: 05/17/18    Goal: Absence of Fall  Outcome: Ongoing (interventions implemented as appropriate)      Problem: Liver Failure, Acute/Chronic (Adult)  Goal: Signs and Symptoms of Listed Potential Problems Will be Absent, Minimized or Managed (Liver Failure, Acute/Chronic)  Outcome: Ongoing (interventions implemented as appropriate)

## 2018-05-17 NOTE — PROGRESS NOTES
" LOS: 1 day     Name: Silvia Zabala  Age: 57 y.o.  Sex: female  :  1960  MRN: [id number]         Primary Care Physician: Blanca Pitts MD    Subjective   Subjective  Epistaxis and hematemesis have resolved.  Denies any black bowel movements.  Having a little bit of abdominal pain but states this is not bad and more of a cramping.    Objective   Vital Signs  Temp:  [98.1 °F (36.7 °C)-100.4 °F (38 °C)] 98.3 °F (36.8 °C)  Heart Rate:  [] 98  Resp:  [14-20] 20  BP: (114-161)/(68-95) 143/84  Body mass index is 26.97 kg/m².    Objective:  General Appearance:  Comfortable and in no acute distress.    Vital signs: (most recent): Blood pressure 143/84, pulse 98, temperature 98.3 °F (36.8 °C), temperature source Oral, resp. rate 20, height 167.6 cm (66\"), weight 75.8 kg (167 lb 1.6 oz), SpO2 98 %.    Lungs:  Normal effort and normal respiratory rate.    Heart: Normal rate.  Regular rhythm.    Abdomen: Abdomen is soft.  Bowel sounds are normal.   There is no abdominal tenderness.     Extremities: There is no dependent edema or local swelling.    Neurological: Patient is alert and oriented to person, place and time.    Skin:  Warm and dry.              Results Review:       I reviewed the patient's new clinical results.      Results from last 7 days  Lab Units 18  0358 18  0058 05/16/18  2042 05/10/18  1253   WBC 10*3/mm3 3.09* 2.78* 2.36* 3.71*   HEMOGLOBIN g/dL 10.7* 10.4* 11.4* 11.5*   PLATELETS 10*3/mm3 4* 8* 3*  6* 107*       Results from last 7 days  Lab Units 18  0358 05/16/18  1943 05/10/18  1253   SODIUM mmol/L 139 140 134*   POTASSIUM mmol/L 4.1 4.3 4.1   CHLORIDE mmol/L 102 101 97*   CO2 mmol/L 24.7 24.4 21.6*   BUN mg/dL 7 6 5*   CREATININE mg/dL 0.72 0.77 0.85   CALCIUM mg/dL 9.0 8.7 9.3   GLUCOSE mg/dL 207* 347* 407*       Results from last 7 days  Lab Units 18   INR  1.26*     Scheduled Meds:     acetaminophen 650 mg Oral Daily   buPROPion  mg Oral Daily "   clotrimazole 10 mg Oral 5x Daily   cycloSPORINE 1 drop Both Eyes BID   diphenhydrAMINE 25 mg Oral Daily   DULoxetine 90 mg Oral Daily   ferrous sulfate 325 mg Oral BID   folic acid 1 mg Oral Daily   immune globulin (human) 400 mg/kg Intravenous Daily   insulin aspart 0-14 Units Subcutaneous 4x Daily With Meals & Nightly   levETIRAcetam 500 mg Oral BID   pantoprazole 40 mg Oral Q AM   pantoprazole 40 mg Intravenous Q12H   predniSONE 15 mg Oral Once   predniSONE 60 mg Oral Daily With Breakfast   [START ON 5/18/2018] predniSONE 75 mg Oral Daily With Breakfast   pregabalin 75 mg Oral BID   rifaximin 550 mg Oral BID   spironolactone 100 mg Oral Daily   sucralfate 1 g Oral BID   trimethobenzamide 300 mg Oral TID     PRN Meds:   •  acetaminophen  •  ALPRAZolam  •  cyclobenzaprine  •  dextrose  •  dextrose  •  glucagon (human recombinant)  •  hydrOXYzine  •  oxyCODONE  •  promethazine  •  sodium chloride  •  Insert peripheral IV **AND** sodium chloride  Continuous Infusions:       Assessment/Plan   Principal Problem:    Thrombocytopenia  Active Problems:    Cirrhosis of liver    Rheumatoid arthritis    Anxiety and depression    Type 2 diabetes mellitus, uncontrolled, with neuropathy    Secondary esophageal varices without bleeding    Secondary esophageal varices with bleeding  Hematemesis  Recurrent fevers    Assessment & Plan    - She has been started on prednisone for her persistent thrombocytopenia.  No active bleeding at present.  Hemoglobin appears stable.  She will also be started on IVIG per hematology.  - She is on U500 insulin at home.  Expected hyperglycemia due to initiation of steroids.  Will ask her endocrinologist to evaluate.  - Infectious disease to see for her intermittent fevers.  - Continue Protonix.  Her hematemesis likely was due to swallowing blood during her epistaxis.  These have since resolved.  She does have a history of esophageal varices however.  GI has been consulted.  - Remains off of Enbrel  for her rheumatoid arthritis until she becomes fever free      Wesly Walsh MD  [facility]  05/17/18  11:17 AM

## 2018-05-17 NOTE — CONSULTS
Referring Provider: Jose Almanza MD  Reason for Consultation: FUO    Subjective   History of present illness:    This a very nice 57-year-old with chronic comorbidities including lupus, rheumatoid arthritis off any immunomodulatory therapy, diabetes mellitus type 2-cirrhosis who was admitted on 5/16/18 with thrombocytopenia.  ID is asked to comment on FUO.    Per the patient, she was in her usual state of health until December 2017 when she was admitted for DKA.  Her gastroparesis had flared and it was thought to be medication related.  She is monitored off multiple different medications including her Enbrel.  She then started developing fevers.  She said they occur daily or every other day last several hours are associated with shaking chills and are without exacerbating or ameliorating factors.  She has had some rash and some joint stiffness and increasing nausea and vomiting as well as chronic substernal chest pain nausea.  Denies receipt of antibiotics other than for urinary tract infection.  She is not around animals has not traveled internationally and has not had tick bites.  Denies other esoteric functions.  Discussed this case with Dr. code of hematology and he is concerned that the patient has acute platelet drop consistent with ITP.    Past medical history Wong cirrhosis, hypersplenism, depression/anxiety, diabetes mellitus type 2, fibromyalgia, gastroparesis, esophageal varices with bleed, rheumatoid arthritis, lupus, seizures, bladder repair, cholecystectomy, cataract extraction, multiple EGD, hysterectomy, right knee surgery with prosthesis      No family history of infectious diseases other than her sisters had some lung infections  Social history: She lives in [place], [place] with her  and his parents.  Denies tobacco ethanol or drug use.  No recent international or domestic travel.  No animal contact    Allergies   Allergen Reactions   • Albuterol Anaphylaxis   • Lactulose Nausea  "And Vomiting and Other (See Comments)     Severe abdominal pain   • Tramadol Other (See Comments)     Per pt causes her \"palsy, meaning shaking and tremors\" and gi upset, nausea   • Quinine Derivatives Nausea And Vomiting         Review of Systems  Pertinent items are noted in HPI, all other systems reviewed and negative    Objective     Physical Exam:   Vital Signs   Temp:  [98.1 °F (36.7 °C)-100.4 °F (38 °C)] 98.3 °F (36.8 °C)  Heart Rate:  [] 98  Resp:  [14-20] 20  BP: (114-161)/(68-95) 143/84    GENERAL: Awake and alert, in no acute distress.   HEENT: Oropharynx is clear. Hearing is grossly normal.   EYES: PERRL. No conjunctival injection. No lid lag.   LYMPHATICS: No lymphadenopathy of the neck or inguinal regions.   HEART: Regular rate and rhythm. No peripheral edema.   LUNGS: Clear to auscultation anteriorly with normal respiratory effort.   GI: Soft, nontender, nondistended.+HSM   SKIN: Warm and dry without cutaneous eruptions   PSYCHIATRIC: Appropriate mood, affect, insight, and judgment.     Results Review:   I reviewed the patient's new clinical results.  WBC 3.1   PLT 4  H/H 10.7/33    CRP 0.42  ESR 44        Microbiology:  bcx ngtd    Radiology: none      Assessment/Plan   1.  FUO for past 6 months  2.  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  Poorly controlled diabetes mellitus type 2, comp getting above    I think it is very likely given the chronicity of her fevers that these are not related to infection.  May be related to the hematologic process or untreated autoimmune disease.  She had recent CT of her abdomen and pelvis in March 2018 which was negative.  About the only place that we haven't looked is a CT of her chest and I will go ahead and order that for completeness sake.  If this remains negative, I would proceed with therapy targeting her rheumatoid arthritis and ITP and see if that has any influence on her fevers.     Thank you for " this consult.  We will continue to follow along and tailor antibiotics as the patient's clinical course evolves.    Shankar Huertas MD  05/17/18  11:31 AM

## 2018-05-17 NOTE — CONSULTS
Adult Nutrition  Assessment/PES    Patient Name:  Silvia Zabala  YOB: 1960  MRN: [id number]  Admit Date:  5/16/2018    Assessment Date:  5/17/2018    Consult received. Nutrition assessment completed. Patient reported poor appetite but knows she needs to be eating q 2 hours with cirrhosis.  Provided nutrition therapy-went over anytime available menu for food preferences. Also, glucerna 3-4 times/day. Needs to drink 2 Q HS.  Will follow.    Reason for Assessment   Reason For Assessment identified at risk by screening criteria;nurse/nurse practitioner consult   Diagnosis   Primary Problem:  Thrombocytopenia    Identified At Risk by Screening Criteria MST SCORE 2+           Adult Nutrition Assessment     Row Name 05/17/18 1421       Nutrition Prescription PO    Common Modifiers Consistent Carbohydrate;GI Soft/[place]    Other Modifiers Low Fiber       PO Evaluation    Number of Meals 1    % PO Intake 50    Row Name 05/17/18 1420                             Body Mass Index (BMI)    BMI Assessment BMI 25-29.9: overweight       Labs/Procedures/Meds    Lab Results Reviewed reviewed, pertinent    Lab Results Comments Meds-iron, insulin, folic acid, PPI, prednisone       Physical Findings    Overall Physical Appearance --   B=20       Calculation Measurements    Weight Used For Calculations 75.8 kg (167 lb 1.7 oz)       Estimated/Assessed Needs    Additional Documentation KCAL/KG (Group);Protein Requirements (Group);Fluid Requirements (Group)       KCAL/KG                                                      kcal/kg (Specify) --   5045-7365       Thorndale-St. Jeor Equation    RMR (Thorndale-St. Jeor Equation) 1369.75       Protein Requirements    Est Protein Requirement Amount (gms/kg) 1.2 gm protein   75-90 gm    Estimated Protein Requirements (gms/day) 90.96       Fluid Requirements    Estimated Fluid Requirements (mL/day) 1900    RDA Method (mL) 1900    Deepika-Jenna Method (over 20 kg) 3013           Problem/Interventions:        Problem 1     Row Name 05/17/18 1421       Nutrition Diagnoses Problem 1    Problem 1 Increased Nutrient Needs    Macronutrient Protein    Etiology (related to) MNT for Treatment/Condition;Medical Diagnosis    Hepatic Cirrhosis    Signs/Symptoms (evidenced by) Report of Mnimal PO Intake                    Intervention Goal     Row Name 05/17/18 1422       Intervention Goal    General Maintain nutrition;Meet nutritional needs for age/condition    PO Tolerate PO;Increase intake    Weight Maintain weight            Nutrition Intervention     Row Name 05/17/18 1422       Nutrition Intervention    RD/Tech Action Interview for preference;Follow Tx progress;Care plan reviewd;Encourage intake;Recommend/ordered;Supplement provided    Recommended/Ordered Supplement            Nutrition Prescription     Row Name 05/17/18 1422       Nutrition Prescription PO    PO Prescription Begin/change supplement    Supplement Glucerna Shake    Supplement Frequency 3 times a day    New PO Prescription Ordered? Yes            Education/Evaluation     Row Name 05/17/18 1422       Education    Education Will Instruct as appropriate       Monitor/Evaluation    Monitor Per protocol    Education Follow-up Reinforce PRN        Electronically signed by:  Belen Mcgrath RD  05/17/18 2:22 PM

## 2018-05-17 NOTE — PLAN OF CARE
Problem: Patient Care Overview  Goal: Plan of Care Review  Outcome: Ongoing (interventions implemented as appropriate)   05/17/18 0658   Coping/Psychosocial   Plan of Care Reviewed With patient   Plan of Care Review   Progress no change   OTHER   Outcome Summary pt admitted from ER nosebleed earlier in the day. Low platlates. Cont to monitor, safety maintained.        Problem: Fall Risk (Adult)  Goal: Identify Related Risk Factors and Signs and Symptoms  Outcome: Ongoing (interventions implemented as appropriate)    Goal: Absence of Fall  Outcome: Ongoing (interventions implemented as appropriate)      Problem: Liver Failure, Acute/Chronic (Adult)  Goal: Signs and Symptoms of Listed Potential Problems Will be Absent, Minimized or Managed (Liver Failure, Acute/Chronic)  Outcome: Ongoing (interventions implemented as appropriate)   05/17/18 0658   Goal/Outcome Evaluation   Problems Assessed (Liver Failure, Acute/Chronic) all   Problems Present (Liver Failure) none;pain;fluid/electrolyte imbalance;impaired coagulation;situational response

## 2018-05-17 NOTE — CONSULTS
.     REASON FOR CONSULTATION:   Low platelets  Provide an opinion on any further workup or treatment                             REQUESTING PHYSICIAN: Jose Almanza MD  RECORDS OBTAINED:  Records of the patients history including those from the electronic medical record were reviewed and summarized in detail.    HISTORY OF PRESENT ILLNESS:  The patient is a 57 y.o. year old female  who is here for follow-up with the above-mentioned history.    3 days of severe nosebleeds followed by one day of hematemesis prompted an ER visit on 5/16/18.  Patient thought perhaps she was swallowing blood from it..  She's had no more hematemesis since coming to the hospital.  In the ER, her platelets were around 6.  Did not respond to platelet transfusion.    Since December, has had extremely high blood sugars, sometimes up to 1000, fevers up to 101, severe nausea and vomiting, and drenching night sweats.  Her only new medicine is Tigan since mid April 2018 for nausea from gastroparesis.    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   • Cirrhosis    • Colon polyps    • Deafness    • Depression    • Diabetes mellitus     Adult onset, insulin requiring   • Duodenal ulcer with hemorrhage    • Esophageal varices determined by endoscopy    • Fibromyalgia    • Gallbladder disease    • Gastric varices    • Gastroparesis    • Glaucoma    • Granuloma annulare    • H/O B12 deficiency    • H/O Bleeding ulcer     And gastroesophageal varices   • H/O mixed connective tissue disorder    • Hemorrhoids    • Hiatal hernia    • History of transfusion    • Hx of being hospitalized     In [place] for GI bleeding from ulcer and varices   • Hyperlipidemia    • Migraine    • DUKES (nonalcoholic steatohepatitis) 11/2016   • BRICE (obstructive sleep apnea)    • Pancreas  "divisum    • Pancytopenia     Chronic, due to cirrhosis and hypersplenism   • Peptic ulcer disease     And esophageal varices   • PONV (postoperative nausea and vomiting)    • RA (rheumatoid arthritis)    • Seizure disorder     LAST ONE SEVERAL YEARS AGO   • Seizures    • Systemic lupus      Past Surgical History:   Procedure Laterality Date   • BLADDER REPAIR  1991   • CATARACT EXTRACTION     • CHOLECYSTECTOMY  1994   • ENDOSCOPY N/A 11/7/2016    Procedure: ESOPHAGOGASTRODUODENOSCOPY WITH COLD POLYPECTOMY, BANDING,  AND CLIPS TIMES 2;  Surgeon: Rich Coleman MD;  Location: [facility] ENDOSCOPY;  Service:    • ENDOSCOPY N/A 12/22/2016    Procedure: ESOPHAGOGASTRODUODENOSCOPY WITH ESOPHAGEAL BANDING. 5 BANDS FIRED, 4 BANDS ADHERERD TO MUCOSA;  Surgeon: Rich Coleman MD;  Location: [facility] ENDOSCOPY;  Service:    • ENDOSCOPY N/A 2/15/2017    Procedure: ESOPHAGOGASTRODUODENOSCOPY AT BEDSIDE with esophageal varices banding (3);  Surgeon: Rich Coleman MD;  Location: [facility] ENDOSCOPY;  Service:    • ENDOSCOPY N/A 4/6/2017    Procedure: ESOPHAGOGASTRODUODENOSCOPY WITH ESOPHAGEAL VARICES BANDING x2;  Surgeon: Rich Coleman MD;  Location: [facility] ENDOSCOPY;  Service:    • ENDOSCOPY N/A 11/24/2017    Procedure: ESOPHAGOGASTRODUODENOSCOPY with biopsies;  Surgeon: Rich Coleman MD;  Location: [facility] ENDOSCOPY;  Service:    • ENDOSCOPY AND COLONOSCOPY  2014    Dr. Rich Coleman with findings of candida esophagitis   • EYE SURGERY     • HYSTERECTOMY  1986    partial   • JOINT REPLACEMENT     • KNEE SURGERY Right 1995    \"right knee recontstruction\"   • LIVER BIOPSY  01/2015   • MASS EXCISION     • STOMACH SURGERY         MEDICATIONS    Current Facility-Administered Medications:   •  acetaminophen (TYLENOL) tablet 650 mg, 650 mg, Oral, Q4H PRN, Jose Almanza MD  •  ALPRAZolam (XANAX) tablet 0.5 mg, 0.5 mg, Oral, BID PRN, Jose Almanza MD  •  buPROPion XL (WELLBUTRIN XL) 24 hr tablet 150 mg, 150 mg, Oral, " Daily, Jose Almanza MD, 150 mg at 05/17/18 0907  •  clotrimazole (MYCELEX) omer 10 mg, 10 mg, Oral, 5x Daily, Jose Almanza MD, 10 mg at 05/17/18 0906  •  cyclobenzaprine (FLEXERIL) tablet 5 mg, 5 mg, Oral, TID PRN, Jose Almanza MD, 5 mg at 05/17/18 0150  •  cycloSPORINE (RESTASIS) 0.05 % ophthalmic emulsion 1 drop, 1 drop, Both Eyes, BID, Jose Almanza MD, 1 drop at 05/17/18 0907  •  dextrose (D50W) solution 25 g, 25 g, Intravenous, Q15 Min PRN, Jose Almanza MD  •  dextrose (GLUTOSE) oral gel 15 g, 15 g, Oral, Q15 Min PRN, Jose Almanza MD  •  DULoxetine (CYMBALTA) DR capsule 90 mg, 90 mg, Oral, Daily, Jose Almanza MD, 90 mg at 05/17/18 0907  •  ferrous sulfate tablet 325 mg, 325 mg, Oral, BID, Jose Almanza MD, 325 mg at 05/17/18 0906  •  folic acid (FOLVITE) tablet 1 mg, 1 mg, Oral, Daily, Jose Almanza MD, 1 mg at 05/17/18 0906  •  glucagon (human recombinant) (GLUCAGEN DIAGNOSTIC) injection 1 mg, 1 mg, Subcutaneous, PRN, Jose Almanza MD  •  HYDROcodone-acetaminophen (NORCO) 5-325 MG per tablet 1 tablet, 1 tablet, Oral, Q4H PRN, Jose Almanza MD, 1 tablet at 05/17/18 0149  •  hydrOXYzine (ATARAX) tablet 25 mg, 25 mg, Oral, TID PRN, Jose Almanza MD  •  insulin aspart (novoLOG) injection 0-14 Units, 0-14 Units, Subcutaneous, 4x Daily With Meals & Nightly, Jose Almanza MD, 10 Units at 05/17/18 0914  •  levETIRAcetam (KEPPRA) tablet 500 mg, 500 mg, Oral, BID, Jose Almanza MD, 500 mg at 05/17/18 0906  •  oxyCODONE (ROXICODONE) immediate release tablet 5 mg, 5 mg, Oral, Q6H PRN, Jose Almanza MD  •  pantoprazole (PROTONIX) EC tablet 40 mg, 40 mg, Oral, Q AM, Jose Almanza MD  •  pantoprazole (PROTONIX) injection 40 mg, 40 mg, Intravenous, Q12H, Jose Almanza MD, 40 mg at 05/17/18 0505  •  predniSONE (DELTASONE) tablet 60 mg, 60 mg, Oral, Daily With Breakfast, Kervin Rajput Jr., MD, 60 mg at 05/17/18 0506  •  pregabalin (LYRICA) capsule 75 mg,  "75 mg, Oral, BID, Jose Almanza MD, 75 mg at 05/17/18 0907  •  promethazine (PHENERGAN) tablet 25 mg, 25 mg, Oral, Q6H PRN, Jose Almanza MD  •  rifaximin (XIFAXAN) tablet 550 mg, 550 mg, Oral, BID, Jose Almanza MD, 550 mg at 05/17/18 0906  •  sodium chloride 0.9 % flush 1-10 mL, 1-10 mL, Intravenous, PRN, Jose Almanza MD  •  Insert peripheral IV, , , Once **AND** sodium chloride 0.9 % flush 10 mL, 10 mL, Intravenous, PRN, Mauro Moore MD  •  spironolactone (ALDACTONE) tablet 100 mg, 100 mg, Oral, Daily, Jose Almanza MD, 100 mg at 05/17/18 0907  •  sucralfate (CARAFATE) tablet 1 g, 1 g, Oral, BID, Jose Almanza MD, 1 g at 05/17/18 0906  •  trimethobenzamide (TIGAN) capsule 300 mg, 300 mg, Oral, TID, Jose Almanza MD, 300 mg at 05/17/18 0906    ALLERGIES:     Allergies   Allergen Reactions   • Albuterol Anaphylaxis   • Lactulose Nausea And Vomiting and Other (See Comments)     Severe abdominal pain   • Tramadol Other (See Comments)     Per pt causes her \"palsy, meaning shaking and tremors\" and gi upset, nausea   • Quinine Derivatives Nausea And Vomiting       SOCIAL HISTORY:       Social History     Social History   • Marital status:      Spouse name: Jose   • Number of children: N/A   • Years of education: N/A     Occupational History   •  Disabled     Social History Main Topics   • Smoking status: Former Smoker     Packs/day: 0.00     Years: 0.00     Quit date: 12/17/2015   • Smokeless tobacco: Never Used   • Alcohol use No   • Drug use: No   • Sexual activity: Defer     Other Topics Concern   • Not on file     Social History Narrative    Moved to [place] from [place]. She is currently medically disabled.         FAMILY HISTORY:  Family History   Problem Relation Age of Onset   • Liver disease Other    • Depression Other    • Heart disease Other    • Hypertension Other    • Diabetes Other    • Breast cancer Other    • Brain cancer Other    • Uterine cancer Other    • " "Colon cancer Other    • Leukemia Other    • Colon cancer Maternal Aunt    • Hypertension Mother    • Diabetes type II Mother    • Rheum arthritis Mother    • Liver disease Mother         DUKES   • Heart disease Father    • Diabetes type II Father    • Diabetes type II Sister    • Lupus Sister    • Malig Hyperthermia Neg Hx        REVIEW OF SYSTEMS:  Review of Systems   Constitutional: Positive for diaphoresis and fever. Negative for activity change.   HENT: Positive for nosebleeds. Negative for trouble swallowing.    Respiratory: Negative for shortness of breath and wheezing.    Cardiovascular: Negative for chest pain and palpitations.   Gastrointestinal: Positive for nausea and vomiting. Negative for blood in stool, constipation and diarrhea.   Genitourinary: Negative for dysuria and hematuria.   Musculoskeletal: Negative for arthralgias and myalgias.   Skin: Negative for rash and wound.   Neurological: Negative for seizures and syncope.   Hematological: Negative for adenopathy. Does not bruise/bleed easily.   Psychiatric/Behavioral: Negative for confusion.              Vitals:    05/17/18 0022 05/17/18 0151 05/17/18 0717 05/17/18 0906   BP: 161/95 155/85 143/84    BP Location: Right arm Left arm Right arm    Patient Position: Lying Lying Lying    Pulse: 106   98   Resp: 16  20    Temp: 99.7 °F (37.6 °C) 98.5 °F (36.9 °C) 98.3 °F (36.8 °C)    TempSrc: Oral Oral Oral    SpO2:  99% 98%    Weight: 75.8 kg (167 lb 1.6 oz)      Height: 167.6 cm (66\")        Current Status 10/12/2017   ECOG score 1      PHYSICAL EXAM:    CONSTITUTIONAL:  Vital signs reviewed.  No distress, looks comfortable.  EYES:  Conjunctiva and lids unremarkable.  PERRLA  EARS,NOSE,MOUTH,THROAT:  Ears and nose appear unremarkable.  Lips, teeth, gums appear unremarkable.  No mucosal bleeding.  No current nose bleeding.  RESPIRATORY:  Normal respiratory effort.  Lungs clear to auscultation bilaterally.  CARDIOVASCULAR:  Normal S1, S2.  No murmurs rubs " or gallops.  No significant lower extremity edema.  GASTROINTESTINAL: Abdomen appears unremarkable.  Nontender.  No hepatomegaly.  No splenomegaly.  LYMPHATIC:  No cervical, supraclavicular, axillary lymphadenopathy.  NEURO: cranial nerves 2-12 grossly intact.  No focal deficits.  Appears to have equal strength all 4 extremities.  MUSCULOSKELETAL:  Unremarkable digits/nails.  No cyanosis or clubbing.  No apparent joint deformities.  SKIN:  Warm.  No rashes.  PSYCHIATRIC:  Normal judgment and insight.  Normal mood and affect.     RECENT LABS:        WBC   Date Value Ref Range Status   05/17/2018 3.09 (L) 4.50 - 10.70 10*3/mm3 Final   05/17/2018 2.78 (L) 4.50 - 10.70 10*3/mm3 Final   05/16/2018 2.36 (L) 4.50 - 10.70 10*3/mm3 Final     Hemoglobin   Date Value Ref Range Status   05/17/2018 10.7 (L) 11.9 - 15.5 g/dL Final   05/17/2018 10.4 (L) 11.9 - 15.5 g/dL Final   05/16/2018 11.4 (L) 11.9 - 15.5 g/dL Final     Platelets   Date Value Ref Range Status   05/17/2018 4 (C) 140 - 500 10*3/mm3 Final   05/17/2018 8 (C) 140 - 500 10*3/mm3 Final   05/16/2018 6 (C) 140 - 500 10*3/mm3 Final   05/16/2018 3 (C) 140 - 500 10*3/mm3 Final       Assessment/Plan       *ITP.  Appears to be most consistent with ITP.  Peripheral smear shows no significant amount of schistocytes and no platelet clumping.  IPF elevated at 18.4%, consistent with a destructive etiology of thrombocytopenia.  Unremarkable: Folate, B12, fibrinogen, PTT.  Did not respond to platelet transfusion, also consistent with a destructive process.    Her only new medicine is Tigan, started mid April 2018 for nausea from gastroparesis.  Upon my review on Micromedex, blood dyscrasias have been reported, but no mention of immune mediated thrombocytopenia.  I doubt this is the cause of the platelet drop.    *Mild elevation in INR at 1.26.  Suspect this is due to cirrhosis.    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    *B12 deficiency.  On oral B12 since  December 2013.    *History of esophageal varices banding.    *Iron deficiency anemia due to GI bleeding.  Has been on oral iron twice per day.    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  This is of unknown significance.    *Rheumatoid arthritis.  Reports she hasn't been unable to take Enbrel since December due to persistent fevers.    *Lupus    *Diabetes.  Hyperglycemia from steroids may be a problem.  Defer to hospitalist.    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    *Fever of up to 101 for a couple months.  She was trying to have a 48 hour time period with no fever associated started back on Enbrel.  Dr. Huertas infectious diseases been consulted.    Plan  · Continue steroids.  I did increase them to 1 mg/kg dosing.  · IVIG 400 mg/kg daily day 1 of 5 days  · Defer expected hyperglycemia from steroids to hospitalist service.    Case discussed with Dr. Rajput who received a call last night.  Case also discussed with Dr. Huertas infectious disease.

## 2018-05-17 NOTE — PROGRESS NOTES
Discharge Planning Assessment   [facility]     Patient Name: Silvia Zabala  MRN: [id number]  Today's Date: 5/17/2018    Admit Date: 5/16/2018          Discharge Needs Assessment     Row Name 05/17/18 3962       Living Environment    Lives With spouse;parent(s)    Name(s) of Who Lives With Patient spouse Jose and his parents    Primary Care Provided by spouse/significant other    Provides Primary Care For no one    Family Caregiver if Needed other (see comments);spouse    Quality of Family Relationships supportive    Able to Return to Prior Arrangements yes       Resource/Environmental Concerns    Resource/Environmental Concerns none       Transition Planning    Patient/Family Anticipates Transition to home with family    Patient/Family Anticipated Services at Transition none       Discharge Needs Assessment    Concerns to be Addressed no discharge needs identified    Equipment Currently Used at Home glucometer;cane, straight    Anticipated Changes Related to Illness none    Equipment Needed After Discharge none            Discharge Plan     Row Name 05/17/18 1505       Plan    Plan Comments CCP met with patient at bedside. CCP role explained and discharge planning discussed.  Face sheet verified  IMM noted.  Pt's emergency contact is her  Jose [phone].   Pt lives in an apartment with her  and his parents.  She is independent with ADL's.   Pt uses a cane to ambulate.  Patient denies home health history.  Patient has no SNF history.  Patient uses [facility] on [facility] in [facility] for her medications.  Pt has transportation home.  Plan is Home.  No other  needs  CCP following         Destination     No service coordination in this encounter.      Durable Medical Equipment     No service coordination in this encounter.      Dialysis/Infusion     No service coordination in this encounter.      Home Medical Care     No service coordination in this encounter.      Social  Care     No service coordination in this encounter.                Demographic Summary    No documentation.           Functional Status    No documentation.           Psychosocial    No documentation.           Abuse/Neglect    No documentation.           Legal    No documentation.           Substance Abuse    No documentation.           Patient Forms    No documentation.         Amber Mike RN

## 2018-05-17 NOTE — CONSULTS
[facility]              Initial Inpatient Consult Note  Referring Provider: Nico  Reason for Consultation: Cirrhosis    Subjective     History of present illness:  This is a patient with end-stage liver disease secondary to nonalcoholic steatohepatitis.  Her course is been complicated by history of GI bleeding.  She is also had problems with gastroparesis and mild encephalopathy.  She has had problems with chronic abdominal pain and is undergone evaluation for that over the course of the last 6 months.  Interestingly, she had undergone endoscopy in November and at that time antral biopsies were obtained and were negative for any evidence of Helicobacter.  In any case, she presented to the emergency department with intractable nosebleeds and she has had some hematemesis, not severe which she believes was due to ingested blood from the nose bleeds.  She is not had any melena and she has not had any stomach pain above and beyond her usual abdominal discomfort.    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   • Cirrhosis    • Colon polyps    • Deafness    • Depression    • Diabetes mellitus     Adult onset, insulin requiring   • Duodenal ulcer with hemorrhage    • Esophageal varices determined by endoscopy    • Fibromyalgia    • Gallbladder disease    • Gastric varices    • Gastroparesis    • Glaucoma    • Granuloma annulare    • H/O B12 deficiency    • H/O Bleeding ulcer     And gastroesophageal varices   • H/O mixed connective tissue disorder    • Hemorrhoids    • Hiatal hernia    • History of transfusion    • Hx of being hospitalized     In [place] for GI bleeding from ulcer and varices   • Hyperlipidemia    • Migraine    • DUKES (nonalcoholic steatohepatitis) 11/2016   •  "BRICE (obstructive sleep apnea)    • Pancreas divisum    • Pancytopenia     Chronic, due to cirrhosis and hypersplenism   • Peptic ulcer disease     And esophageal varices   • PONV (postoperative nausea and vomiting)    • RA (rheumatoid arthritis)    • Seizure disorder     LAST ONE SEVERAL YEARS AGO   • Seizures    • Systemic lupus        Past Surgical History:  Past Surgical History:   Procedure Laterality Date   • BLADDER REPAIR  1991   • CATARACT EXTRACTION     • CHOLECYSTECTOMY  1994   • ENDOSCOPY N/A 11/7/2016    Procedure: ESOPHAGOGASTRODUODENOSCOPY WITH COLD POLYPECTOMY, BANDING,  AND CLIPS TIMES 2;  Surgeon: Rich Coleman MD;  Location: [facility] ENDOSCOPY;  Service:    • ENDOSCOPY N/A 12/22/2016    Procedure: ESOPHAGOGASTRODUODENOSCOPY WITH ESOPHAGEAL BANDING. 5 BANDS FIRED, 4 BANDS ADHERERD TO MUCOSA;  Surgeon: Rich Coleman MD;  Location: [facility] ENDOSCOPY;  Service:    • ENDOSCOPY N/A 2/15/2017    Procedure: ESOPHAGOGASTRODUODENOSCOPY AT BEDSIDE with esophageal varices banding (3);  Surgeon: Rich Coleman MD;  Location: [facility] ENDOSCOPY;  Service:    • ENDOSCOPY N/A 4/6/2017    Procedure: ESOPHAGOGASTRODUODENOSCOPY WITH ESOPHAGEAL VARICES BANDING x2;  Surgeon: Rich Coleman MD;  Location: [facility] ENDOSCOPY;  Service:    • ENDOSCOPY N/A 11/24/2017    Procedure: ESOPHAGOGASTRODUODENOSCOPY with biopsies;  Surgeon: Rich Coleman MD;  Location: [facility] ENDOSCOPY;  Service:    • ENDOSCOPY AND COLONOSCOPY  2014    Dr. Rich Coleman with findings of candida esophagitis   • EYE SURGERY     • HYSTERECTOMY  1986    partial   • JOINT REPLACEMENT     • KNEE SURGERY Right 1995    \"right knee recontstruction\"   • LIVER BIOPSY  01/2015   • MASS EXCISION     • STOMACH SURGERY          Social History:   Social History   Substance Use Topics   • Smoking status: Former Smoker     Packs/day: 0.00     Years: 0.00     Quit date: 12/17/2015   • Smokeless tobacco: Never Used   • Alcohol use No        Family " History:  Family History   Problem Relation Age of Onset   • Liver disease Other    • Depression Other    • Heart disease Other    • Hypertension Other    • Diabetes Other    • Breast cancer Other    • Brain cancer Other    • Uterine cancer Other    • Colon cancer Other    • Leukemia Other    • Colon cancer Maternal Aunt    • Hypertension Mother    • Diabetes type II Mother    • Rheum arthritis Mother    • Liver disease Mother         DUKES   • Heart disease Father    • Diabetes type II Father    • Diabetes type II Sister    • Lupus Sister    • Malig Hyperthermia Neg Hx        Home Meds:  Prescriptions Prior to Admission   Medication Sig Dispense Refill Last Dose   • Insulin Regular Human, Conc, (HUMULIN R U-500 KWIKPEN) 500 UNIT/ML solution pen-injector CONCENTRATED injection 25 units with each meal plus the sliding scale. Maximum of 50 units per meal. (Patient taking differently: Inject 40 Units under the skin 3 (Three) Times a Day Before Meals. 40 units with each meal plus the sliding scale. Maximum of 55 units per meal.) 5 pen 3 Patient Taking Differently at Unknown time   • ALPRAZolam (XANAX) 0.5 MG tablet TAKE ONE TABLET BY MOUTH TWICE A DAY AS NEEDED FOR ANXIETY 60 tablet 2 Taking   • buPROPion XL (WELLBUTRIN XL) 150 MG 24 hr tablet TAKE ONE TABLET BY MOUTH DAILY 90 tablet 3 Taking   • cholecalciferol (VITAMIN D3) 1000 UNITS tablet Take 1,000 Units by mouth Daily.   Taking   • clotrimazole (MYCELEX) 10 MG omer Take 1 tablet by mouth 5 (Five) Times a Day. 70 tablet 1 Taking   • cycloSPORINE (RESTASIS) 0.05 % ophthalmic emulsion 1 drop 2 (two) times a day.   Taking   • DULoxetine (CYMBALTA) 30 MG capsule TAKE THREE CAPSULES BY MOUTH DAILY 90 capsule 3 Taking   • ferrous sulfate 325 (65 FE) MG tablet TAKE ONE TABLET BY MOUTH TWICE A DAY 60 tablet 1 Taking   • folic acid (FOLVITE) 1 MG tablet Take 1 mg by mouth Daily.   Taking   • furosemide (LASIX) 40 MG tablet Take 1 tablet by mouth Daily. 30 tablet 2    •  hydrOXYzine (ATARAX) 25 MG tablet Take 25 mg by mouth 3 (Three) Times a Day As Needed for Itching.   Taking   • Insulin Pen Needle 31G X 5 MM misc To inject 5 -6 times per day 200 each 3 Taking   • levETIRAcetam (KEPPRA) 500 MG tablet TAKE ONE TABLET BY MOUTH TWICE A DAY 60 tablet 1 Taking   • LYRICA 75 MG capsule TAKE ONE CAPSULE BY MOUTH TWICE A DAY 60 capsule 0 Taking   • Multiple Vitamin (MULTI-VITAMIN PO) Take 1 tablet by mouth Daily.   Taking   • ONE TOUCH ULTRA TEST test strip USE FOR TESTING FOUR TIMES A  each 4 Taking   • OxyCODONE HCl (OXAYDO) 7.5 MG tablet  Take 7.5 mg by mouth Every 8 (Eight) Hours As Needed.   Taking   • pantoprazole (PROTONIX) 40 MG EC tablet TAKE ONE TABLET BY MOUTH DAILY 30 tablet 4 Taking   • promethazine (PHENERGAN) 25 MG tablet TAKE ONE TABLET BY MOUTH EVERY 6 HOURS AS NEEDED FOR NAUSEA OR VOMITING 30 tablet 0 Taking   • RELISTOR 150 MG tablet 150 mg Daily.   Taking   • spironolactone (ALDACTONE) 100 MG tablet TAKE ONE TABLET BY MOUTH DAILY 30 tablet 4 Taking   • sucralfate (CARAFATE) 1 g tablet Take 1 tablet by mouth 2 (Two) Times a Day. 60 tablet 2    • trimethobenzamide (TIGAN) 300 MG capsule Take 300 mg by mouth 3 (Three) Times a Day.   Taking   • vitamin B-12 (CYANOCOBALAMIN) 1000 MCG tablet Take 1,000 mcg by mouth daily.   Taking   • XIFAXAN 550 MG tablet TAKE ONE TABLET BY MOUTH TWICE A DAY 60 tablet 4 Taking       Current Meds:     acetaminophen 650 mg Oral Daily   buPROPion  mg Oral Daily   clotrimazole 10 mg Oral 5x Daily   cycloSPORINE 1 drop Both Eyes BID   diphenhydrAMINE 25 mg Oral Daily   DULoxetine 90 mg Oral Daily   ferrous sulfate 325 mg Oral BID   folic acid 1 mg Oral Daily   immune globulin (human) 400 mg/kg Intravenous Daily   insulin aspart 0-14 Units Subcutaneous 4x Daily With Meals & Nightly   insulin aspart 18 Units Subcutaneous TID With Meals   insulin detemir 30 Units Subcutaneous Nightly   insulin detemir 50 Units Subcutaneous QAM  "  levETIRAcetam 500 mg Oral BID   pantoprazole 40 mg Intravenous Q12H   [START ON 5/18/2018] predniSONE 75 mg Oral Daily With Breakfast   pregabalin 75 mg Oral BID   rifaximin 550 mg Oral BID   spironolactone 100 mg Oral Daily   sucralfate 1 g Oral BID   trimethobenzamide 300 mg Oral TID       Allergies:  Allergies   Allergen Reactions   • Albuterol Anaphylaxis   • Lactulose Nausea And Vomiting and Other (See Comments)     Severe abdominal pain   • Tramadol Other (See Comments)     Per pt causes her \"palsy, meaning shaking and tremors\" and gi upset, nausea   • Quinine Derivatives Nausea And Vomiting       Review of Systems  Pertinent items are noted in HPI, all other systems reviewed and negative    Objective     Vital Signs  Temp:  [98 °F (36.7 °C)-100.4 °F (38 °C)] 98 °F (36.7 °C)  Heart Rate:  [] 113  Resp:  [14-20] 18  BP: (114-161)/(68-95) 131/90    Physical Exam:     General Appearance:    Alert, cooperative, in no acute distress   Head:    Normocephalic, without obvious abnormality, atraumatic   Eyes:            Lids and lashes normal, conjunctivae and sclerae normal, no   icterus, no pallor, corneas clear, PERRLA   Ears:    Ears appear intact with no abnormalities noted   Throat:   No oral lesions, no thrush, oral mucosa moist   Neck:   No adenopathy, supple, trachea midline, no thyromegaly, no     carotid bruit, no JVD   Back:     No kyphosis present, no scoliosis present, no skin lesions,       erythema or scars, no tenderness to percussion or                   palpation,   range of motion normal   Lungs:     Clear to auscultation,respirations regular, even and                   unlabored    Heart:    Regular rhythm and normal rate, normal S1 and S2, no            murmur, no gallop, no rub, no click   Breast Exam:    Deferred   Abdomen:     Not distended.  Tender in the upper abdomen which prevents deep palpation of liver and spleen.  These are typical findings for the patient, they do not appear to " be acute in nature.     Genitalia:    Deferred   Extremities:   Moves all extremities well, no edema, no cyanosis, no              redness   Pulses:   Pulses palpable and equal bilaterally   Skin:   Scattered ecchymoses.  No crepitus.     Lymph nodes:   No palpable adenopathy   Neurologic:   Cranial nerves 2 - 12 grossly intact, sensation intact, DTR        present and equal bilaterally       Results Review:   I reviewed the patient's new clinical results.    WBC No results found for: WBCS   HGB Hemoglobin   Date Value Ref Range Status   05/17/2018 10.7 (L) 11.9 - 15.5 g/dL Final   05/17/2018 10.4 (L) 11.9 - 15.5 g/dL Final   05/16/2018 11.4 (L) 11.9 - 15.5 g/dL Final      HCT Hematocrit   Date Value Ref Range Status   05/17/2018 33.4 (L) 35.6 - 45.5 % Final   05/17/2018 32.7 (L) 35.6 - 45.5 % Final   05/16/2018 35.6 35.6 - 45.5 % Final      Platlets No results found for: LABPLAT   MCV MCV   Date Value Ref Range Status   05/17/2018 86.3 80.5 - 98.2 fL Final   05/17/2018 86.5 80.5 - 98.2 fL Final   05/16/2018 86.6 80.5 - 98.2 fL Final          Sodium Sodium   Date Value Ref Range Status   05/17/2018 139 136 - 145 mmol/L Final   05/16/2018 140 136 - 145 mmol/L Final      Potassium Potassium   Date Value Ref Range Status   05/17/2018 4.1 3.5 - 5.2 mmol/L Final   05/16/2018 4.3 3.5 - 5.2 mmol/L Final      Chloride Chloride   Date Value Ref Range Status   05/17/2018 102 98 - 107 mmol/L Final   05/16/2018 101 98 - 107 mmol/L Final      CO2 CO2   Date Value Ref Range Status   05/17/2018 24.7 22.0 - 29.0 mmol/L Final   05/16/2018 24.4 22.0 - 29.0 mmol/L Final      BUN BUN   Date Value Ref Range Status   05/17/2018 7 6 - 20 mg/dL Final   05/16/2018 6 6 - 20 mg/dL Final      Creatinine Creatinine   Date Value Ref Range Status   05/17/2018 0.72 0.57 - 1.00 mg/dL Final   05/16/2018 0.77 0.57 - 1.00 mg/dL Final      Calcium Calcium   Date Value Ref Range Status   05/17/2018 9.0 8.6 - 10.5 mg/dL Final   05/16/2018 8.7 8.6 - 10.5  mg/dL Final      Albumin Albumin   Date Value Ref Range Status   05/16/2018 3.40 (L) 3.50 - 5.20 g/dL Final      AST  ALT  PT/INR:   AST (SGOT)   Date Value Ref Range Status   05/16/2018 45 (H) 1 - 32 U/L Final     ALT (SGPT)   Date Value Ref Range Status   05/16/2018 23 1 - 33 U/L Final     Protime   Date Value Ref Range Status   05/16/2018 15.6 (H) 11.7 - 14.2 Seconds Final   /  INR   Date Value Ref Range Status   05/16/2018 1.26 (H) 0.90 - 1.10 Final            Imaging Results (last 72 hours)     Procedure Component Value Units Date/Time    CT Chest Without Contrast [[id number]] Collected:  05/17/18 1433     Updated:  05/17/18 1441    Narrative:       CT CHEST WO CONTRAST-     CLINICAL HISTORY: Dyspnea. Chest pain. Fever.     TECHNIQUE: Spiral CT images were obtained through the chest without IV  contrast and were reconstructed in 3 mm thick slices.     Radiation dose reduction techniques were utilized, including automated  exposure control and exposure modulation based on body size.     COMPARISON: Chest x-ray dated 3/30/2018.     FINDINGS: There is minimal pleural and parenchymal scarring at the left  lung apex. The lungs are otherwise clear. There are no infiltrates or  discrete lung masses. There is no mediastinal or hilar or axillary  lymphadenopathy. There are no pleural effusions. Images through the  upper abdomen show mild diffuse injection of the peripancreatic fat that  could indicate acute pancreatitis. Correlation with clinical findings is  recommended.       Impression:       Unremarkable CT scan of the chest except for equivocal  evidence of pancreatitis in the upper abdomen as noted.     This report was finalized on 5/17/2018 2:38 PM by Dr. Marco Ramos M.D.             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  There has been an association between ITP and Helicobacter but previous biopsies have not shown any evidence of this particular infection.  Her stomach symptoms are long-standing.  Think this is much to do with her metabolic status which is been characterized by very poor diabetic control and chronic extremely high blood sugars.  SHe does not appear to have significant GI tract bleeding.  Her ITP is being aggressively managed by high-dose steroids and IVIG.  Should she develop anything suggestive of variceal bleeding I would immediately start her on octreotide bolus and infusion.  However, I would not perform any endoscopic intervention at this time due to her severe thrombocytopenia.    I discussed the patients findings and my recommendations with patient and family    Rich Coleman MD  [facility]  05/17/18  6:51 PM

## 2018-05-17 NOTE — CONSULTS
57 y.o.  Patient Care Team:  Blanca Pitts MD as PCP - General (Internal Medicine)  Sukhdeep Mcdowell MD as Consulting Physician (Hematology and Oncology)  Blanca Pitts MD as Referring Physician (Internal Medicine)      Chief Complaint   Patient presents with   • Dizziness   • Nose Bleed   Reason for consultation-elevated blood sugars.    HPI    57-year-old white female with a complicated past medical history presents with 3 days of severe for nosebleeds followed with 1 episode of hematemesis.  Patient is currently being followed by hematology oncology.  Endocrinology has been consulted due to the elevated blood sugars.  She is a patient of mine for the management of her diabetes.    Type 2 diabetes mellitus  Diagnosed in her 20s.  Patient has been on insulin since then.  In April 2018 patient was started on U-500 insulin due to her significantly elevated blood sugars.  At home currently patient is on U-500 insulin 40 units with each meal and 22 units with snack, high dose sliding scale with each meal and at bedtime.  She has been on this regimen for the last 2-3 days before her presentation to the hospital.  And she does admit that on this increased insulin regimen her blood sugars have been running in 100-200 mg/dL range.  Highest blood sugar since her last visit was around 300 mg/dL range.  Lowest blood sugar was around 89 mg/dL range.  Last eye examination was in January 2018 and no history of diabetic retinopathy.  Does have history of diabetic neuropathy and is on Lyrica 75 mg twice daily.  Patient's diabetic diet intake is extremely limited due to the history of gastroparesis.  Patient is also on high-calorie diet intake due to the history of liver cirrhosis.      Pancreatic divisum : No hx of pancreatitis per pt.       Liver disease/DUKES - She is required to eat 80 gm of protein per day and she drinks two Glucerna shakes at bed time. Sees Dr. Jordan from [facility].       Gastroparesis - Sees Dr. Coleman.         Hyperlipidemia - on pravastatin 40 mg po daily.     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   • Cirrhosis    • Colon polyps    • Deafness    • Depression    • Diabetes mellitus     Adult onset, insulin requiring   • Duodenal ulcer with hemorrhage    • Esophageal varices determined by endoscopy    • Fibromyalgia    • Gallbladder disease    • Gastric varices    • Gastroparesis    • Glaucoma    • Granuloma annulare    • H/O B12 deficiency    • H/O Bleeding ulcer     And gastroesophageal varices   • H/O mixed connective tissue disorder    • Hemorrhoids    • Hiatal hernia    • History of transfusion    • Hx of being hospitalized     In [place] for GI bleeding from ulcer and varices   • Hyperlipidemia    • Migraine    • DUKES (nonalcoholic steatohepatitis) 11/2016   • BRICE (obstructive sleep apnea)    • Pancreas divisum    • Pancytopenia     Chronic, due to cirrhosis and hypersplenism   • Peptic ulcer disease     And esophageal varices   • PONV (postoperative nausea and vomiting)    • RA (rheumatoid arthritis)    • Seizure disorder     LAST ONE SEVERAL YEARS AGO   • Seizures    • Systemic lupus        Family History   Problem Relation Age of Onset   • Liver disease Other    • Depression Other    • Heart disease Other    • Hypertension Other    • Diabetes Other    • Breast cancer Other    • Brain cancer Other    • Uterine cancer Other    • Colon cancer Other    • Leukemia Other    • Colon cancer Maternal Aunt    • Hypertension Mother    • Diabetes type II Mother    • Rheum arthritis Mother    • Liver disease Mother         DUKES   • Heart disease Father    • Diabetes type II Father    • Diabetes type II Sister    • Lupus Sister    • Malig Hyperthermia Neg Hx        Social History     Social History   • Marital status:       "Spouse name: Jose   • Number of children: N/A   • Years of education: N/A     Occupational History   •  Disabled     Social History Main Topics   • Smoking status: Former Smoker     Packs/day: 0.00     Years: 0.00     Quit date: 12/17/2015   • Smokeless tobacco: Never Used   • Alcohol use No   • Drug use: No   • Sexual activity: Defer     Other Topics Concern   • Not on file     Social History Narrative    Moved to [place] from [place]. She is currently medically disabled.       Allergies   Allergen Reactions   • Albuterol Anaphylaxis   • Lactulose Nausea And Vomiting and Other (See Comments)     Severe abdominal pain   • Tramadol Other (See Comments)     Per pt causes her \"palsy, meaning shaking and tremors\" and gi upset, nausea   • Quinine Derivatives Nausea And Vomiting         Current Facility-Administered Medications:   •  acetaminophen (TYLENOL) tablet 650 mg, 650 mg, Oral, Q4H PRN, Jose Almanza MD  •  acetaminophen (TYLENOL) tablet 650 mg, 650 mg, Oral, Daily, Gerson Olson II, MD, 650 mg at 05/17/18 1400  •  ALPRAZolam (XANAX) tablet 0.5 mg, 0.5 mg, Oral, BID PRN, Jose Almanza MD  •  buPROPion XL (WELLBUTRIN XL) 24 hr tablet 150 mg, 150 mg, Oral, Daily, Jose Almanza MD, 150 mg at 05/17/18 0907  •  clotrimazole (MYCELEX) omer 10 mg, 10 mg, Oral, 5x Daily, Jose Almanza MD, 10 mg at 05/17/18 1401  •  cyclobenzaprine (FLEXERIL) tablet 5 mg, 5 mg, Oral, TID PRN, Jose Almanza MD, 5 mg at 05/17/18 0150  •  cycloSPORINE (RESTASIS) 0.05 % ophthalmic emulsion 1 drop, 1 drop, Both Eyes, BID, Jose Almanza MD, 1 drop at 05/17/18 0907  •  dextrose (D50W) solution 25 g, 25 g, Intravenous, Q15 Min PRN, Jose Almanza MD  •  dextrose (GLUTOSE) oral gel 15 g, 15 g, Oral, Q15 Min PRN, Jose Almanza MD  •  diphenhydrAMINE (BENADRYL) capsule 25 mg, 25 mg, Oral, Daily, Gerson Olson II, MD, 25 mg at 05/17/18 1400  •  DULoxetine (CYMBALTA) DR capsule 90 mg, 90 mg, Oral, Daily, Jose COLÓN" MD Archie, 90 mg at 05/17/18 0907  •  ferrous sulfate tablet 325 mg, 325 mg, Oral, BID, Jose Almanza MD, 325 mg at 05/17/18 0906  •  folic acid (FOLVITE) tablet 1 mg, 1 mg, Oral, Daily, Jose Almanza MD, 1 mg at 05/17/18 0906  •  glucagon (human recombinant) (GLUCAGEN DIAGNOSTIC) injection 1 mg, 1 mg, Subcutaneous, PRN, Jose Almanza MD  •  hydrOXYzine (ATARAX) tablet 25 mg, 25 mg, Oral, TID PRN, Jose Almanza MD  •  immune globulin (human) (GAMMAGARD) infusion 30 g, 400 mg/kg, Intravenous, Daily, Gerson Olson II, MD, 30 g at 05/17/18 1517  •  insulin aspart (novoLOG) injection 0-14 Units, 0-14 Units, Subcutaneous, 4x Daily With Meals & Nightly, Jose Almanza MD, 14 Units at 05/17/18 1132  •  insulin aspart (novoLOG) injection 18 Units, 18 Units, Subcutaneous, TID With Meals, Merary Burnett MD  •  insulin detemir (LEVEMIR) injection 30 Units, 30 Units, Subcutaneous, Nightly, Merary Burnett MD  •  insulin detemir (LEVEMIR) injection 50 Units, 50 Units, Subcutaneous, QAM, Merary Burnett MD  •  levETIRAcetam (KEPPRA) tablet 500 mg, 500 mg, Oral, BID, Jose Almanza MD, 500 mg at 05/17/18 0906  •  oxyCODONE (ROXICODONE) immediate release tablet 5 mg, 5 mg, Oral, Q6H PRN, Jose Almanza MD, 5 mg at 05/17/18 1105  •  pantoprazole (PROTONIX) injection 40 mg, 40 mg, Intravenous, Q12H, Jose Almanza MD, 40 mg at 05/17/18 0505  •  predniSONE (DELTASONE) tablet 15 mg, 15 mg, Oral, Once, Gerson Olson II, MD  •  [START ON 5/18/2018] predniSONE (DELTASONE) tablet 75 mg, 75 mg, Oral, Daily With Breakfast, Gerson Olson II, MD  •  pregabalin (LYRICA) capsule 75 mg, 75 mg, Oral, BID, Jose Almanza MD, 75 mg at 05/17/18 0907  •  promethazine (PHENERGAN) tablet 25 mg, 25 mg, Oral, Q6H PRN, Jose Almanza MD, 25 mg at 05/17/18 1105  •  rifaximin (XIFAXAN) tablet 550 mg, 550 mg, Oral, BID, Jose Almanza MD, 550 mg at 05/17/18 0906  •  sodium chloride 0.9 % flush 1-10 mL, 1-10 mL,  Intravenous, PRN, Jose Almanza MD  •  Insert peripheral IV, , , Once **AND** sodium chloride 0.9 % flush 10 mL, 10 mL, Intravenous, PRN, Mauro Moore MD  •  spironolactone (ALDACTONE) tablet 100 mg, 100 mg, Oral, Daily, Jose Almanza MD, 100 mg at 05/17/18 0907  •  sucralfate (CARAFATE) tablet 1 g, 1 g, Oral, BID, Jose Almanza MD, 1 g at 05/17/18 0906  •  trimethobenzamide (TIGAN) capsule 300 mg, 300 mg, Oral, TID, Jose Almanza MD, 300 mg at 05/17/18 0906         Review of Systems   Constitutional: Positive for appetite change and fatigue. Negative for fever.   Respiratory: Negative for shortness of breath.    Cardiovascular: Negative for chest pain.   Gastrointestinal: Negative for diarrhea and vomiting.   Endocrine: Negative for polydipsia and polyuria.   Genitourinary: Negative for flank pain.   Musculoskeletal: Positive for arthralgias and myalgias.   Skin: Negative for pallor and wound.   Neurological: Positive for weakness and numbness.   Psychiatric/Behavioral: Negative for agitation.     Objective     Vital Signs  Temp:  [98 °F (36.7 °C)-100.4 °F (38 °C)] 98 °F (36.7 °C)  Heart Rate:  [] 112  Resp:  [14-20] 18  BP: (114-161)/(68-95) 135/83    Physical Exam  Physical Exam   Constitutional: She is oriented to person, place, and time. She appears well-nourished.   Obese     HENT:   Head: Normocephalic and atraumatic.   Wide neck   Eyes: Conjunctivae and EOM are normal. No scleral icterus.   Neck: Normal range of motion. Neck supple. No thyromegaly present.   Acanthosis nigricans   Cardiovascular: Normal rate and normal heart sounds.    Pulmonary/Chest: Effort normal and breath sounds normal. No stridor. She has no wheezes.   Abdominal: Soft. Bowel sounds are normal. She exhibits no distension. There is no tenderness.   Central obesity   Musculoskeletal: She exhibits no edema or tenderness.   Neurological: She is alert and oriented to person, place, and time.   Skin: Skin is warm and  dry. She is not diaphoretic.   Psychiatric: She has a normal mood and affect.   Vitals reviewed.      Results Review:    I reviewed the patient's new clinical results.  Glucose   Date/Time Value Ref Range Status   05/17/2018 1057 493 (C) 70 - 130 mg/dL Final   05/17/2018 0614 303 (H) 70 - 130 mg/dL Final   05/17/2018 0158 207 (H) 70 - 130 mg/dL Final     Lab Results (last 24 hours)     Procedure Component Value Units Date/Time    TSPOT [[id number]] Collected:  05/17/18 0049    Specimen:  Blood Updated:  05/17/18 1425     TSPOTTB Non-reportable due to insufficient cells.  Need 2x10e6 of peripheral blood mononuclear cells (PBMC) from whole blood. Recommend recollection of specimen. Two full tubes required for retest.     T-SPOT Panel A 0     T-SPOT Panel B 0     TSPOT NIL CONTROL INTERP Failed     TSPOT POS CONTROL INTERP Failed    Blood Culture - Blood, [[id number]]  (Normal) Collected:  05/17/18 0054    Specimen:  Blood from Arm, Left Updated:  05/17/18 1300     Blood Culture No growth at less than 24 hours    Blood Culture - Blood, [[id number]]  (Normal) Collected:  05/17/18 0049    Specimen:  Blood from Arm, Right Updated:  05/17/18 1300     Blood Culture No growth at less than 24 hours    POC Glucose Once [[id number]]  (Abnormal) Collected:  05/17/18 1057    Specimen:  Blood Updated:  05/17/18 1058     Glucose 493 (C) mg/dL     Narrative:       Repeat Test Meter: [id number] : [id number] Mil JOE    POC Glucose Once [[id number]]  (Abnormal) Collected:  05/17/18 0614    Specimen:  Blood Updated:  05/17/18 0615     Glucose 303 (H) mg/dL     Narrative:       Meter: [id number] : [id number] Conner JOE    Sedimentation Rate [[id number]]  (Abnormal) Collected:  05/17/18 0358    Specimen:  Blood Updated:  05/17/18 0534     Sed Rate 44 (H) mm/hr     CBC & Differential [[id number]] Collected:  05/17/18 0358    Specimen:  Blood Updated:  05/17/18 0513    Narrative:       The following orders were created for  panel order CBC & Differential.  Procedure                               Abnormality         Status                     ---------                               -----------         ------                     Scan Slide[[id number]]                                                                  CBC Auto Differential[[id number]]        Abnormal            Final result                 Please view results for these tests on the individual orders.    CBC Auto Differential [[id number]]  (Abnormal) Collected:  05/17/18 0358    Specimen:  Blood Updated:  05/17/18 0513     WBC 3.09 (L) 10*3/mm3      RBC 3.87 (L) 10*6/mm3      Hemoglobin 10.7 (L) g/dL      Hematocrit 33.4 (L) %      MCV 86.3 fL      MCH 27.6 pg      MCHC 32.0 (L) g/dL      RDW 16.0 (H) %      RDW-SD 50.1 fl      MPV -- fL      Comment: .        Platelets 4 (C) 10*3/mm3      Neutrophil % 59.5 %      Lymphocyte % 25.6 %      Monocyte % 8.7 %      Eosinophil % 5.2 %      Basophil % 1.0 %      Immature Grans % 0.3 %      Neutrophils, Absolute 1.84 (L) 10*3/mm3      Lymphocytes, Absolute 0.79 (L) 10*3/mm3      Monocytes, Absolute 0.27 10*3/mm3      Eosinophils, Absolute 0.16 10*3/mm3      Basophils, Absolute 0.03 10*3/mm3      Immature Grans, Absolute 0.01 10*3/mm3      nRBC 0.0 /100 WBC     Basic Metabolic Panel [[id number]]  (Abnormal) Collected:  05/17/18 0358    Specimen:  Blood Updated:  05/17/18 0510     Glucose 207 (H) mg/dL      BUN 7 mg/dL      Creatinine 0.72 mg/dL      Sodium 139 mmol/L      Potassium 4.1 mmol/L      Chloride 102 mmol/L      CO2 24.7 mmol/L      Calcium 9.0 mg/dL      eGFR Non African Amer 84 mL/min/1.73      BUN/Creatinine Ratio 9.7     Anion Gap 12.3 mmol/L     Narrative:       GFR Normal >60  Chronic Kidney Disease <60  Kidney Failure <15    C-reactive Protein [[id number]]  (Normal) Collected:  05/17/18 0358    Specimen:  Blood Updated:  05/17/18 0510     C-Reactive Protein 0.42 mg/dL     Lactate Dehydrogenase [[id number]]  (Abnormal)  Collected:  05/17/18 0049    Specimen:  Blood Updated:  05/17/18 0309      (H) U/L     Troponin [[id number]]  (Normal) Collected:  05/17/18 0049    Specimen:  Blood Updated:  05/17/18 0309     Troponin T <0.010 ng/mL     Narrative:       Troponin T Reference Ranges:  Less than 0.03 ng/mL:    Negative for AMI  0.03 to 0.09 ng/mL:      Indeterminant for AMI  Greater than 0.09 ng/mL: Positive for AMI    HIV-1 & HIV-2 Antibodies [[id number]] Collected:  05/17/18 0049    Specimen:  Blood Updated:  05/17/18 0236    Narrative:       The following orders were created for panel order HIV-1 & HIV-2 Antibodies.  Procedure                               Abnormality         Status                     ---------                               -----------         ------                     HIV-1 / O / 2 Ag / Antib...[[id number]]  Normal              Final result                 Please view results for these tests on the individual orders.    HIV-1 / O / 2 Ag / Antibody 4th Generation [[id number]]  (Normal) Collected:  05/17/18 0049    Specimen:  Blood Updated:  05/17/18 0236     HIV-1/ HIV-2 Non-Reactive     HIV-1 P24 Ag Screen Non-Reactive    POC Glucose Once [[id number]]  (Abnormal) Collected:  05/17/18 0158    Specimen:  Blood Updated:  05/17/18 0200     Glucose 207 (H) mg/dL     Narrative:       Meter: [id number] : [id number] Conner JOE    Vitamin B12 [[id number]]  (Normal) Collected:  05/17/18 0049    Specimen:  Blood Updated:  05/17/18 0147     Vitamin B-12 939 pg/mL     Folate [[id number]]  (Normal) Collected:  05/17/18 0049    Specimen:  Blood Updated:  05/17/18 0147     Folate 19.22 ng/mL     CBC & Differential [[id number]] Collected:  05/17/18 0058    Specimen:  Blood Updated:  05/17/18 0144    Narrative:       The following orders were created for panel order CBC & Differential.  Procedure                               Abnormality         Status                     ---------                                -----------         ------                     Scan Slide[[id number]]                                                                  CBC Auto Differential[[id number]]        Abnormal            Final result                 Please view results for these tests on the individual orders.    CBC Auto Differential [[id number]]  (Abnormal) Collected:  05/17/18 0058    Specimen:  Blood Updated:  05/17/18 0144     WBC 2.78 (L) 10*3/mm3      RBC 3.78 (L) 10*6/mm3      Hemoglobin 10.4 (L) g/dL      Hematocrit 32.7 (L) %      MCV 86.5 fL      MCH 27.5 pg      MCHC 31.8 (L) g/dL      RDW 15.9 (H) %      RDW-SD 50.4 fl      MPV -- fL      Comment: .        Platelets 8 (C) 10*3/mm3      Neutrophil % 61.9 %      Lymphocyte % 22.7 %      Monocyte % 11.5 %      Eosinophil % 3.2 %      Basophil % 0.7 %      Immature Grans % 0.4 %      Neutrophils, Absolute 1.72 (L) 10*3/mm3      Lymphocytes, Absolute 0.63 (L) 10*3/mm3      Monocytes, Absolute 0.32 10*3/mm3      Eosinophils, Absolute 0.09 10*3/mm3      Basophils, Absolute 0.02 10*3/mm3      Immature Grans, Absolute 0.01 10*3/mm3      nRBC 0.0 /100 WBC     [facility] Comprehensive Panel [[id number]] Collected:  05/17/18 0049    Specimen:  Blood Updated:  05/17/18 0101    Rheumatoid Factor [[id number]]  (Abnormal) Collected:  05/16/18 1943    Specimen:  Blood Updated:  05/17/18 0024     Rheumatoid Factor Quantitative 20.9 (H) IU/mL     Immature Platelet Fraction [[id number]]  (Abnormal) Collected:  05/16/18 2042    Specimen:  Blood Updated:  05/16/18 2332     IPF 18.40 (H) %      Platelets 3 (C) 10*3/mm3     Fibrinogen [[id number]]  (Normal) Collected:  05/16/18 1943    Specimen:  Blood Updated:  05/16/18 2325     Fibrinogen 357 mg/dL     CBC & Differential [[id number]] Collected:  05/16/18 2042    Specimen:  Blood Updated:  05/16/18 2140    Narrative:       The following orders were created for panel order CBC & Differential.  Procedure                               Abnormality         Status                      ---------                               -----------         ------                     Scan Slide[[id number]]                   Normal              Final result               CBC Auto Differential[[id number]]        Abnormal            Final result                 Please view results for these tests on the individual orders.    CBC Auto Differential [[id number]]  (Abnormal) Collected:  05/16/18 2042    Specimen:  Blood Updated:  05/16/18 2140     WBC 2.36 (L) 10*3/mm3      RBC 4.11 10*6/mm3      Hemoglobin 11.4 (L) g/dL      Hematocrit 35.6 %      MCV 86.6 fL      MCH 27.7 pg      MCHC 32.0 (L) g/dL      RDW 15.8 (H) %      RDW-SD 50.2 fl      MPV -- fL      Comment: .         Platelets 6 (C) 10*3/mm3      Neutrophil % 62.3 %      Lymphocyte % 23.3 %      Monocyte % 8.9 %      Eosinophil % 4.7 %      Basophil % 0.8 %      Immature Grans % 0.4 %      Neutrophils, Absolute 1.47 (L) 10*3/mm3      Lymphocytes, Absolute 0.55 (L) 10*3/mm3      Monocytes, Absolute 0.21 10*3/mm3      Eosinophils, Absolute 0.11 10*3/mm3      Basophils, Absolute 0.02 10*3/mm3      Immature Grans, Absolute 0.01 10*3/mm3      nRBC 0.0 /100 WBC     Scan Slide [[id number]]  (Normal) Collected:  05/16/18 2042    Specimen:  Blood Updated:  05/16/18 2140     RBC Morphology Normal     WBC Morphology Normal     Platelet Morphology Normal    Protime-INR [[id number]]  (Abnormal) Collected:  05/16/18 1943    Specimen:  Blood Updated:  05/16/18 2025     Protime 15.6 (H) Seconds      INR 1.26 (H)    aPTT [[id number]]  (Normal) Collected:  05/16/18 1943    Specimen:  Blood Updated:  05/16/18 2025     PTT 30.2 seconds     Comprehensive Metabolic Panel [[id number]]  (Abnormal) Collected:  05/16/18 1943    Specimen:  Blood Updated:  05/16/18 2022     Glucose 347 (H) mg/dL      BUN 6 mg/dL      Creatinine 0.77 mg/dL      Sodium 140 mmol/L      Potassium 4.3 mmol/L      Chloride 101 mmol/L      CO2 24.4 mmol/L      Calcium 8.7 mg/dL      Total Protein 6.8  g/dL      Albumin 3.40 (L) g/dL      ALT (SGPT) 23 U/L      AST (SGOT) 45 (H) U/L      Alkaline Phosphatase 125 (H) U/L      Total Bilirubin 0.8 mg/dL      eGFR Non African Amer 78 mL/min/1.73      Globulin 3.4 gm/dL      A/G Ratio 1.0 g/dL      BUN/Creatinine Ratio 7.8     Anion Gap 14.6 mmol/L           Imaging Results (last 24 hours)     Procedure Component Value Units Date/Time    CT Chest Without Contrast [[id number]] Collected:  05/17/18 1433     Updated:  05/17/18 1441    Narrative:       CT CHEST WO CONTRAST-     CLINICAL HISTORY: Dyspnea. Chest pain. Fever.     TECHNIQUE: Spiral CT images were obtained through the chest without IV  contrast and were reconstructed in 3 mm thick slices.     Radiation dose reduction techniques were utilized, including automated  exposure control and exposure modulation based on body size.     COMPARISON: Chest x-ray dated 3/30/2018.     FINDINGS: There is minimal pleural and parenchymal scarring at the left  lung apex. The lungs are otherwise clear. There are no infiltrates or  discrete lung masses. There is no mediastinal or hilar or axillary  lymphadenopathy. There are no pleural effusions. Images through the  upper abdomen show mild diffuse injection of the peripancreatic fat that  could indicate acute pancreatitis. Correlation with clinical findings is  recommended.       Impression:       Unremarkable CT scan of the chest except for equivocal  evidence of pancreatitis in the upper abdomen as noted.     This report was finalized on 5/17/2018 2:38 PM by Dr. Marco Ramos M.D.             Assessment/Plan     Active Hospital Problems (** Indicates Principal Problem)    Diagnosis Date Noted   • **Thrombocytopenia [D69.6] 05/16/2018   • Fever [R50.9] 05/17/2018   • Secondary esophageal varices without bleeding [I85.10] 02/22/2017   • Systemic lupus [M32.9] 02/22/2017   • Hematemesis [K92.0] 02/15/2017   • DUKES (nonalcoholic steatohepatitis) [K75.81] 06/14/2016   • Type 2  "diabetes mellitus, uncontrolled, with neuropathy [E11.40, E11.65] 03/15/2016   • Cirrhosis of liver [K74.60] 03/08/2016   • Rheumatoid arthritis [M06.9] 03/08/2016   • Anxiety and depression [F41.8] 03/08/2016      Resolved Hospital Problems    Diagnosis Date Noted Date Resolved   • Secondary esophageal varices with bleeding [I85.11] 03/07/2017 05/17/2018     Type 2 diabetes mellitus-severely uncontrolled  Patient's blood sugars are very uncontrolled to begin with and now on steroids it would be extremely challenging to get the blood sugars under control without the risk of hypoglycemia  Given patient's multiple comorbidities will try to aim her blood sugars between 100-200 mg/dL range.  Will start patient on levemir 50 units in the morning  Will give the first dose of levemir now.  Will start the patient on levemir 30 units at bedtime, will hold if that sugar is less than 100 mg/dL range.  Will start NovoLog 18 units with each meal  Will cover with high dose NovoLog sliding scale 3 times a day before meals and at bedtime.    Will give levemir injections even if the injections are close together due to the high blood sugars that the patient is having.    Hyperlipidemia  Continue pravastatin 40 mg oral daily.    ITP  Patient is currently on steroids and is being managed by hematology oncology  Will adjust insulin regimen based on the steroid dosing.      Merary Burnett MD.  05/17/18  4:07 PM        [facility] Dragon / transcription disclaimer:    \"Dictated utilizing Dragon dictation\".   "

## 2018-05-18 PROBLEM — D69.6 THROMBOCYTOPENIA (HCC): Status: RESOLVED | Noted: 2018-05-16 | Resolved: 2018-05-18

## 2018-05-18 PROBLEM — D69.3 ACUTE ITP (HCC): Status: ACTIVE | Noted: 2018-05-18

## 2018-05-18 LAB
AMYLASE SERPL-CCNC: 16 U/L (ref 28–100)
ANION GAP SERPL CALCULATED.3IONS-SCNC: 12.4 MMOL/L
BASOPHILS # BLD AUTO: 0.01 10*3/MM3 (ref 0–0.2)
BASOPHILS NFR BLD AUTO: 0.5 % (ref 0–1.5)
BUN BLD-MCNC: 13 MG/DL (ref 6–20)
BUN/CREAT SERPL: 18.6 (ref 7–25)
CALCIUM SPEC-SCNC: 8.8 MG/DL (ref 8.6–10.5)
CENTROMERE B AB SER-ACNC: <0.2 AI (ref 0–0.9)
CHLORIDE SERPL-SCNC: 100 MMOL/L (ref 98–107)
CHROMATIN AB SERPL-ACNC: <0.2 AI (ref 0–0.9)
CO2 SERPL-SCNC: 23.6 MMOL/L (ref 22–29)
CREAT BLD-MCNC: 0.7 MG/DL (ref 0.57–1)
DEPRECATED RDW RBC AUTO: 48 FL (ref 37–54)
DSDNA AB SER-ACNC: 1 IU/ML (ref 0–9)
ENA JO1 AB SER-ACNC: <0.2 AI (ref 0–0.9)
ENA RNP AB SER-ACNC: 0.4 AI (ref 0–0.9)
ENA SCL70 AB SER-ACNC: <0.2 AI (ref 0–0.9)
ENA SM AB SER-ACNC: <0.2 AI (ref 0–0.9)
ENA SS-A AB SER-ACNC: <0.2 AI (ref 0–0.9)
ENA SS-B AB SER-ACNC: <0.2 AI (ref 0–0.9)
EOSINOPHIL # BLD AUTO: 0.01 10*3/MM3 (ref 0–0.7)
EOSINOPHIL NFR BLD AUTO: 0.5 % (ref 0.3–6.2)
ERYTHROCYTE [DISTWIDTH] IN BLOOD BY AUTOMATED COUNT: 15.4 % (ref 11.7–13)
GFR SERPL CREATININE-BSD FRML MDRD: 87 ML/MIN/1.73
GLUCOSE BLD-MCNC: 323 MG/DL (ref 65–99)
GLUCOSE BLDC GLUCOMTR-MCNC: 295 MG/DL (ref 70–130)
GLUCOSE BLDC GLUCOMTR-MCNC: 334 MG/DL (ref 70–130)
GLUCOSE BLDC GLUCOMTR-MCNC: 336 MG/DL (ref 70–130)
GLUCOSE BLDC GLUCOMTR-MCNC: 414 MG/DL (ref 70–130)
GLUCOSE BLDC GLUCOMTR-MCNC: 452 MG/DL (ref 70–130)
HCT VFR BLD AUTO: 29.9 % (ref 35.6–45.5)
HGB BLD-MCNC: 9.6 G/DL (ref 11.9–15.5)
IMM GRANULOCYTES # BLD: 0.02 10*3/MM3 (ref 0–0.03)
IMM GRANULOCYTES NFR BLD: 1 % (ref 0–0.5)
LIPASE SERPL-CCNC: 9 U/L (ref 13–60)
LYMPHOCYTES # BLD AUTO: 0.49 10*3/MM3 (ref 0.9–4.8)
LYMPHOCYTES NFR BLD AUTO: 24.4 % (ref 19.6–45.3)
Lab: NORMAL
MCH RBC QN AUTO: 27.4 PG (ref 26.9–32)
MCHC RBC AUTO-ENTMCNC: 32.1 G/DL (ref 32.4–36.3)
MCV RBC AUTO: 85.2 FL (ref 80.5–98.2)
MONOCYTES # BLD AUTO: 0.23 10*3/MM3 (ref 0.2–1.2)
MONOCYTES NFR BLD AUTO: 11.4 % (ref 5–12)
NEUTROPHILS # BLD AUTO: 1.27 10*3/MM3 (ref 1.9–8.1)
NEUTROPHILS NFR BLD AUTO: 63.2 % (ref 42.7–76)
NRBC BLD MANUAL-RTO: 0 /100 WBC (ref 0–0)
PLATELET # BLD AUTO: 4 10*3/MM3 (ref 140–500)
PMV BLD AUTO: ABNORMAL FL (ref 6–12)
POTASSIUM BLD-SCNC: 4.4 MMOL/L (ref 3.5–5.2)
RBC # BLD AUTO: 3.51 10*6/MM3 (ref 3.9–5.2)
SODIUM BLD-SCNC: 136 MMOL/L (ref 136–145)
WBC NRBC COR # BLD: 2.01 10*3/MM3 (ref 4.5–10.7)

## 2018-05-18 PROCEDURE — 86707 HEPATITIS BE ANTIBODY: CPT | Performed by: INTERNAL MEDICINE

## 2018-05-18 PROCEDURE — 82150 ASSAY OF AMYLASE: CPT | Performed by: INTERNAL MEDICINE

## 2018-05-18 PROCEDURE — 63710000001 PREDNISONE PER 5 MG: Performed by: INTERNAL MEDICINE

## 2018-05-18 PROCEDURE — 63710000001 INSULIN ASPART PER 5 UNITS: Performed by: INTERNAL MEDICINE

## 2018-05-18 PROCEDURE — 83690 ASSAY OF LIPASE: CPT | Performed by: INTERNAL MEDICINE

## 2018-05-18 PROCEDURE — 86706 HEP B SURFACE ANTIBODY: CPT | Performed by: INTERNAL MEDICINE

## 2018-05-18 PROCEDURE — 63710000001 PREDNISONE PER 1 MG: Performed by: INTERNAL MEDICINE

## 2018-05-18 PROCEDURE — 87350 HEPATITIS BE AG IA: CPT | Performed by: INTERNAL MEDICINE

## 2018-05-18 PROCEDURE — 99233 SBSQ HOSP IP/OBS HIGH 50: CPT | Performed by: INTERNAL MEDICINE

## 2018-05-18 PROCEDURE — 63710000001 INSULIN ASPART PER 5 UNITS: Performed by: HOSPITALIST

## 2018-05-18 PROCEDURE — 87449 NOS EACH ORGANISM AG IA: CPT | Performed by: INTERNAL MEDICINE

## 2018-05-18 PROCEDURE — 63710000001 INSULIN DETEMER PER 5 UNITS: Performed by: INTERNAL MEDICINE

## 2018-05-18 PROCEDURE — 86704 HEP B CORE ANTIBODY TOTAL: CPT | Performed by: INTERNAL MEDICINE

## 2018-05-18 PROCEDURE — 99232 SBSQ HOSP IP/OBS MODERATE 35: CPT | Performed by: INTERNAL MEDICINE

## 2018-05-18 PROCEDURE — 25010000002 IMMUNE GLOBULIN (HUMAN) 30 GM/300ML SOLUTION: Performed by: INTERNAL MEDICINE

## 2018-05-18 PROCEDURE — 85025 COMPLETE CBC W/AUTO DIFF WBC: CPT | Performed by: INTERNAL MEDICINE

## 2018-05-18 PROCEDURE — 80048 BASIC METABOLIC PNL TOTAL CA: CPT | Performed by: HOSPITALIST

## 2018-05-18 PROCEDURE — 63710000001 PROMETHAZINE PER 25 MG: Performed by: HOSPITALIST

## 2018-05-18 PROCEDURE — 63710000001 DIPHENHYDRAMINE PER 50 MG: Performed by: INTERNAL MEDICINE

## 2018-05-18 PROCEDURE — 82962 GLUCOSE BLOOD TEST: CPT

## 2018-05-18 PROCEDURE — 80074 ACUTE HEPATITIS PANEL: CPT | Performed by: INTERNAL MEDICINE

## 2018-05-18 PROCEDURE — 63710000001 INSULIN ASPART PER 5 UNITS: Performed by: FAMILY MEDICINE

## 2018-05-18 RX ORDER — PANTOPRAZOLE SODIUM 40 MG/1
40 TABLET, DELAYED RELEASE ORAL
Status: DISCONTINUED | OUTPATIENT
Start: 2018-05-18 | End: 2018-05-23 | Stop reason: HOSPADM

## 2018-05-18 RX ADMIN — PREGABALIN 75 MG: 75 CAPSULE ORAL at 21:54

## 2018-05-18 RX ADMIN — INSULIN DETEMIR 50 UNITS: 100 INJECTION, SOLUTION SUBCUTANEOUS at 07:32

## 2018-05-18 RX ADMIN — PREGABALIN 75 MG: 75 CAPSULE ORAL at 09:42

## 2018-05-18 RX ADMIN — TRIMETHOBENZAMIDE HYDROCHLORIDE 300 MG: 300 CAPSULE ORAL at 21:55

## 2018-05-18 RX ADMIN — IMMUNE GLOBULIN INFUSION (HUMAN) 30 G: 100 INJECTION, SOLUTION INTRAVENOUS; SUBCUTANEOUS at 11:24

## 2018-05-18 RX ADMIN — DIPHENHYDRAMINE HYDROCHLORIDE 25 MG: 25 CAPSULE ORAL at 09:42

## 2018-05-18 RX ADMIN — OXYCODONE HYDROCHLORIDE 5 MG: 5 TABLET ORAL at 17:10

## 2018-05-18 RX ADMIN — FERROUS SULFATE TAB 325 MG (65 MG ELEMENTAL FE) 325 MG: 325 (65 FE) TAB at 09:42

## 2018-05-18 RX ADMIN — INSULIN ASPART 10 UNITS: 100 INJECTION, SOLUTION INTRAVENOUS; SUBCUTANEOUS at 18:26

## 2018-05-18 RX ADMIN — CLOTRIMAZOLE 10 MG: 10 LOZENGE ORAL at 07:30

## 2018-05-18 RX ADMIN — CLOTRIMAZOLE 10 MG: 10 LOZENGE ORAL at 17:10

## 2018-05-18 RX ADMIN — INSULIN ASPART 8 UNITS: 100 INJECTION, SOLUTION INTRAVENOUS; SUBCUTANEOUS at 12:51

## 2018-05-18 RX ADMIN — BUPROPION HYDROCHLORIDE 150 MG: 150 TABLET, EXTENDED RELEASE ORAL at 09:42

## 2018-05-18 RX ADMIN — LEVETIRACETAM 500 MG: 500 TABLET, FILM COATED ORAL at 09:41

## 2018-05-18 RX ADMIN — SUCRALFATE 1 G: 1 TABLET ORAL at 21:55

## 2018-05-18 RX ADMIN — INSULIN ASPART 18 UNITS: 100 INJECTION, SOLUTION INTRAVENOUS; SUBCUTANEOUS at 12:51

## 2018-05-18 RX ADMIN — LEVETIRACETAM 500 MG: 500 TABLET, FILM COATED ORAL at 21:55

## 2018-05-18 RX ADMIN — OXYCODONE HYDROCHLORIDE 5 MG: 5 TABLET ORAL at 09:42

## 2018-05-18 RX ADMIN — SUCRALFATE 1 G: 1 TABLET ORAL at 09:41

## 2018-05-18 RX ADMIN — CYCLOSPORINE 1 DROP: 0.5 EMULSION OPHTHALMIC at 09:43

## 2018-05-18 RX ADMIN — INSULIN ASPART 10 UNITS: 100 INJECTION, SOLUTION INTRAVENOUS; SUBCUTANEOUS at 09:43

## 2018-05-18 RX ADMIN — CLOTRIMAZOLE 10 MG: 10 LOZENGE ORAL at 14:35

## 2018-05-18 RX ADMIN — CLOTRIMAZOLE 10 MG: 10 LOZENGE ORAL at 21:54

## 2018-05-18 RX ADMIN — CLOTRIMAZOLE 10 MG: 10 LOZENGE ORAL at 12:51

## 2018-05-18 RX ADMIN — RIFAXIMIN 550 MG: 550 TABLET ORAL at 21:55

## 2018-05-18 RX ADMIN — DULOXETINE HYDROCHLORIDE 90 MG: 60 CAPSULE, DELAYED RELEASE ORAL at 09:42

## 2018-05-18 RX ADMIN — TRIMETHOBENZAMIDE HYDROCHLORIDE 300 MG: 300 CAPSULE ORAL at 09:42

## 2018-05-18 RX ADMIN — INSULIN ASPART 16 UNITS: 100 INJECTION, SOLUTION INTRAVENOUS; SUBCUTANEOUS at 22:42

## 2018-05-18 RX ADMIN — SPIRONOLACTONE 100 MG: 100 TABLET ORAL at 09:42

## 2018-05-18 RX ADMIN — Medication 10 ML: at 21:55

## 2018-05-18 RX ADMIN — FERROUS SULFATE TAB 325 MG (65 MG ELEMENTAL FE) 325 MG: 325 (65 FE) TAB at 21:55

## 2018-05-18 RX ADMIN — ALPRAZOLAM 0.5 MG: 0.5 TABLET ORAL at 18:29

## 2018-05-18 RX ADMIN — INSULIN ASPART 18 UNITS: 100 INJECTION, SOLUTION INTRAVENOUS; SUBCUTANEOUS at 09:43

## 2018-05-18 RX ADMIN — PROMETHAZINE HYDROCHLORIDE 25 MG: 25 TABLET ORAL at 17:10

## 2018-05-18 RX ADMIN — TRIMETHOBENZAMIDE HYDROCHLORIDE 300 MG: 300 CAPSULE ORAL at 17:10

## 2018-05-18 RX ADMIN — RIFAXIMIN 550 MG: 550 TABLET ORAL at 09:41

## 2018-05-18 RX ADMIN — ACETAMINOPHEN 650 MG: 325 TABLET, FILM COATED ORAL at 09:43

## 2018-05-18 RX ADMIN — FOLIC ACID 1 MG: 1 TABLET ORAL at 09:42

## 2018-05-18 RX ADMIN — PREDNISONE 75 MG: 5 TABLET ORAL at 09:41

## 2018-05-18 RX ADMIN — PANTOPRAZOLE SODIUM 40 MG: 40 INJECTION, POWDER, FOR SOLUTION INTRAVENOUS at 09:41

## 2018-05-18 RX ADMIN — INSULIN DETEMIR 40 UNITS: 100 INJECTION, SOLUTION SUBCUTANEOUS at 21:54

## 2018-05-18 RX ADMIN — CYCLOSPORINE 1 DROP: 0.5 EMULSION OPHTHALMIC at 21:54

## 2018-05-18 RX ADMIN — INSULIN ASPART 24 UNITS: 100 INJECTION, SOLUTION INTRAVENOUS; SUBCUTANEOUS at 18:26

## 2018-05-18 NOTE — PROGRESS NOTES
[facility]     Inpatient Follow Up Note    Patient Identification:  Name: Silvia Zabala  Age: 57 y.o.  Sex: female  :  1960  MRN: [id number]    Information from:patient     CC: ESLD    History: No improvement in platelet count yet. No melena, hematochezia or hematemesis. Abdominal pain about the same. CT chest noted, no prior history of pancreatitis.     Review of Systems:  Constitutional:  Very weak.   Cardiovascular:  Negative   Respiratory:  Negative             Problem List:  Patient Active Problem List    Diagnosis   • Acute ITP [D69.3]   • Fever [R50.9]   • Hepatic encephalopathy [K72.90]   • Abnormal finding of blood chemistry  [R79.9]   • Diabetic peripheral neuropathy [E11.42]   • History of seizure disorder [Z86.69]   • Diabetic ketoacidosis without coma associated with type 2 diabetes mellitus [E13.10]   • Cirrhosis of liver without ascites [K74.60]     Overview Note:     Added automatically from request for surgery [id number]     • Gastroparesis [K31.84]   • Portal hypertension [K76.6]   • Systemic lupus [M32.9]   • Secondary esophageal varices without bleeding [I85.10]   • Ascites [R18.8]   • Hematemesis [K92.0]   • Generalized abdominal pain [R10.84]   • Hyperlipidemia [E78.5]   • Nausea [R11.0]   • DUKES (nonalcoholic steatohepatitis) [K75.81]   • Pancytopenia [D61.818]   • Hypersplenism [D73.1]   • Type 2 diabetes mellitus, uncontrolled, with neuropathy [E11.40, E11.65]   • Fibrositis [M79.7]   • Change in blood platelet count [PJD7554]   • Cirrhosis of liver [K74.60]   • Rheumatoid arthritis [M06.9]   • Anxiety and depression [F41.8]     Current Meds:  MAR Reviewed  Scheduled Meds:  acetaminophen 650 mg Oral Daily   buPROPion  mg Oral Daily   clotrimazole 10 mg Oral 5x Daily   cycloSPORINE 1 drop Both Eyes BID   diphenhydrAMINE 25 mg Oral Daily   DULoxetine 90 mg Oral Daily   ferrous sulfate 325 mg Oral BID   folic acid 1 mg Oral Daily   immune globulin  "(human) 400 mg/kg Intravenous Daily   insulin aspart 0-14 Units Subcutaneous 4x Daily With Meals & Nightly   insulin aspart 24 Units Subcutaneous TID With Meals   insulin detemir 40 Units Subcutaneous Nightly   [START ON 2018] insulin detemir 60 Units Subcutaneous QAM   levETIRAcetam 500 mg Oral BID   pantoprazole 40 mg Oral Q AM   predniSONE 75 mg Oral Daily With Breakfast   pregabalin 75 mg Oral BID   rifaximin 550 mg Oral BID   spironolactone 100 mg Oral Daily   sucralfate 1 g Oral BID   trimethobenzamide 300 mg Oral TID     Continuous Infusions:   PRN Meds:.•  acetaminophen  •  ALPRAZolam  •  cyclobenzaprine  •  dextrose  •  dextrose  •  glucagon (human recombinant)  •  hydrOXYzine  •  oxyCODONE  •  promethazine  •  sodium chloride  •  Insert peripheral IV **AND** sodium chloride  Allergies:  Allergies   Allergen Reactions   • Albuterol Anaphylaxis   • Lactulose Nausea And Vomiting and Other (See Comments)     Severe abdominal pain   • Tramadol Other (See Comments)     Per pt causes her \"palsy, meaning shaking and tremors\" and gi upset, nausea   • Quinine Derivatives Nausea And Vomiting       Intake/Output:     Intake/Output Summary (Last 24 hours) at 18 1641  Last data filed at 18 1247   Gross per 24 hour   Intake              240 ml   Output                0 ml   Net              240 ml     New allergies/reactions:  None    Physical Exam:  Vitals:   Temp (24hrs), Av.4 °F (36.9 °C), Min:98.4 °F (36.9 °C), Max:98.6 °F (37 °C)    Temp:  [98.4 °F (36.9 °C)-98.6 °F (37 °C)] 98.4 °F (36.9 °C)  Heart Rate:  [] 98  Resp:  [16-20] 20  BP: (129-149)/(77-90) 136/80  /80   Pulse 98   Temp 98.4 °F (36.9 °C) (Oral)   Resp 20   Ht 167.6 cm (66\")   Wt 75.8 kg (167 lb 1.6 oz)   SpO2 97%   BMI 26.97 kg/m²     Exam:  NAD  PERRLA. Sclerae and conjunctivae normal  HENT: external inspection normal. Hearing intact.  No respiratory distress.   Alert, oriented, normal affect. "         DATA:  Radiology and Labs:   Recent Results (from the past 24 hour(s))   POC Glucose Once    Collection Time: 05/17/18  4:51 PM   Result Value Ref Range    Glucose 437 (H) 70 - 130 mg/dL   Prepare Platelet Pheresis, 2 Units    Collection Time: 05/17/18  6:00 PM   Result Value Ref Range    Product Code D4937N62     Unit Number [id number]     UNIT  ABO A     UNIT  RH POS     Dispense Status PT     Blood Type APOS     Blood Expiration Date 201805192359     Blood Type Barcode 6200     Product Code Q2779C64     Unit Number [id number]     UNIT  ABO A     UNIT  RH POS     Dispense Status RE     Blood Type APOS     Blood Expiration Date 201805192359     Blood Type Barcode 6200    POC Glucose Once    Collection Time: 05/17/18  9:35 PM   Result Value Ref Range    Glucose 347 (H) 70 - 130 mg/dL   Basic Metabolic Panel    Collection Time: 05/18/18  4:25 AM   Result Value Ref Range    Glucose 323 (H) 65 - 99 mg/dL    BUN 13 6 - 20 mg/dL    Creatinine 0.70 0.57 - 1.00 mg/dL    Sodium 136 136 - 145 mmol/L    Potassium 4.4 3.5 - 5.2 mmol/L    Chloride 100 98 - 107 mmol/L    CO2 23.6 22.0 - 29.0 mmol/L    Calcium 8.8 8.6 - 10.5 mg/dL    eGFR Non African Amer 87 >60 mL/min/1.73    BUN/Creatinine Ratio 18.6 7.0 - 25.0    Anion Gap 12.4 mmol/L   CBC Auto Differential    Collection Time: 05/18/18  4:25 AM   Result Value Ref Range    WBC 2.01 (L) 4.50 - 10.70 10*3/mm3    RBC 3.51 (L) 3.90 - 5.20 10*6/mm3    Hemoglobin 9.6 (L) 11.9 - 15.5 g/dL    Hematocrit 29.9 (L) 35.6 - 45.5 %    MCV 85.2 80.5 - 98.2 fL    MCH 27.4 26.9 - 32.0 pg    MCHC 32.1 (L) 32.4 - 36.3 g/dL    RDW 15.4 (H) 11.7 - 13.0 %    RDW-SD 48.0 37.0 - 54.0 fl    MPV  6.0 - 12.0 fL    Platelets 4 (C) 140 - 500 10*3/mm3    Neutrophil % 63.2 42.7 - 76.0 %    Lymphocyte % 24.4 19.6 - 45.3 %    Monocyte % 11.4 5.0 - 12.0 %    Eosinophil % 0.5 0.3 - 6.2 %    Basophil % 0.5 0.0 - 1.5 %    Immature Grans % 1.0 (H) 0.0 - 0.5 %    Neutrophils, Absolute 1.27 (L)  1.90 - 8.10 10*3/mm3    Lymphocytes, Absolute 0.49 (L) 0.90 - 4.80 10*3/mm3    Monocytes, Absolute 0.23 0.20 - 1.20 10*3/mm3    Eosinophils, Absolute 0.01 0.00 - 0.70 10*3/mm3    Basophils, Absolute 0.01 0.00 - 0.20 10*3/mm3    Immature Grans, Absolute 0.02 0.00 - 0.03 10*3/mm3    nRBC 0.0 0.0 - 0.0 /100 WBC   Amylase    Collection Time: 05/18/18  4:25 AM   Result Value Ref Range    Amylase 16 (L) 28 - 100 U/L   Lipase    Collection Time: 05/18/18  4:25 AM   Result Value Ref Range    Lipase 9 (L) 13 - 60 U/L   POC Glucose Once    Collection Time: 05/18/18  6:34 AM   Result Value Ref Range    Glucose 334 (H) 70 - 130 mg/dL   POC Glucose Once    Collection Time: 05/18/18 11:44 AM   Result Value Ref Range    Glucose 295 (H) 70 - 130 mg/dL   POC Glucose Once    Collection Time: 05/18/18  4:30 PM   Result Value Ref Range    Glucose 336 (H) 70 - 130 mg/dL       Assessment:   Problem List:   Principal Problem:    Acute ITP  Active Problems:    Cirrhosis of liver    Rheumatoid arthritis    Anxiety and depression    Type 2 diabetes mellitus, uncontrolled, with neuropathy    DUKES (nonalcoholic steatohepatitis)    Hematemesis    Systemic lupus    Secondary esophageal varices without bleeding    Fever    Doubt pancreatitis. No response to therapy for ITP. No active GI bleeding.    Plan:   Continue current management and careful monitoring.   Octreotide for active bleeding.       Rich Coleman MD  [facility]  5/18/2018

## 2018-05-18 NOTE — PROGRESS NOTES
" LOS: 2 days     Name: Silvia Zabala  Age: 57 y.o.  Sex: female  :  1960  MRN: [id number]         Primary Care Physician: Blanca Pitts MD    Subjective   Subjective  States that she feels quite fatigued today but has no other new complaints.    Objective   Vital Signs  Temp:  [98 °F (36.7 °C)-98.6 °F (37 °C)] 98.4 °F (36.9 °C)  Heart Rate:  [] 96  Resp:  [16-20] 20  BP: (127-142)/(77-90) 129/79  Body mass index is 26.97 kg/m².    Objective:  General Appearance:  Comfortable and in no acute distress.    Vital signs: (most recent): Blood pressure 129/79, pulse 96, temperature 98.4 °F (36.9 °C), temperature source Oral, resp. rate 20, height 167.6 cm (66\"), weight 75.8 kg (167 lb 1.6 oz), SpO2 97 %.    Lungs:  Normal effort and normal respiratory rate.    Heart: Normal rate.  Regular rhythm.    Abdomen: Abdomen is soft.  Bowel sounds are normal.   There is no abdominal tenderness.     Extremities: There is no dependent edema or local swelling.    Neurological: Patient is alert and oriented to person, place and time.    Skin:  Warm and dry.              Results Review:       I reviewed the patient's new clinical results.      Results from last 7 days  Lab Units 18  [id number] 188 18  2042   WBC 10*3/mm3 2.01* 3.09* 2.78* 2.36*   HEMOGLOBIN g/dL 9.6* 10.7* 10.4* 11.4*   PLATELETS 10*3/mm3 4* 4* 8* 3*  6*       Results from last 7 days  Lab Units 18  0425 18  0358 18  1943   SODIUM mmol/L 136 139 140   POTASSIUM mmol/L 4.4 4.1 4.3   CHLORIDE mmol/L 100 102 101   CO2 mmol/L 23.6 24.7 24.4   BUN mg/dL 13 7 6   CREATININE mg/dL 0.70 0.72 0.77   CALCIUM mg/dL 8.8 9.0 8.7   GLUCOSE mg/dL 323* 207* 347*       Results from last 7 days  Lab Units 18   INR  1.26*             Scheduled Meds:     acetaminophen 650 mg Oral Daily   buPROPion  mg Oral Daily   clotrimazole 10 mg Oral 5x Daily   cycloSPORINE 1 drop Both Eyes BID   diphenhydrAMINE 25 mg " Oral Daily   DULoxetine 90 mg Oral Daily   ferrous sulfate 325 mg Oral BID   folic acid 1 mg Oral Daily   immune globulin (human) 400 mg/kg Intravenous Daily   insulin aspart 0-14 Units Subcutaneous 4x Daily With Meals & Nightly   insulin aspart 18 Units Subcutaneous TID With Meals   insulin detemir 30 Units Subcutaneous Nightly   insulin detemir 50 Units Subcutaneous QAM   levETIRAcetam 500 mg Oral BID   pantoprazole 40 mg Intravenous Q12H   predniSONE 75 mg Oral Daily With Breakfast   pregabalin 75 mg Oral BID   rifaximin 550 mg Oral BID   spironolactone 100 mg Oral Daily   sucralfate 1 g Oral BID   trimethobenzamide 300 mg Oral TID     PRN Meds:   •  acetaminophen  •  ALPRAZolam  •  cyclobenzaprine  •  dextrose  •  dextrose  •  glucagon (human recombinant)  •  hydrOXYzine  •  oxyCODONE  •  promethazine  •  sodium chloride  •  Insert peripheral IV **AND** sodium chloride  Continuous Infusions:       Assessment/Plan   Principal Problem:    ITP  Active Problems:    Cirrhosis of liver    Rheumatoid arthritis    Anxiety and depression    Type 2 diabetes mellitus, uncontrolled, with neuropathy    DUKES (nonalcoholic steatohepatitis)    Hematemesis    Systemic lupus    Secondary esophageal varices without bleeding    Fever      Assessment & Plan    - She will continue prednisone and IVIG for treatment of her ITP.  Hematology managing.  No active bleeding at present.  - She has been started on aggressive insulin regimen per endocrinology  - No evidence of infectious process.  CT scan of the chest unremarkable.  Intermittent fevers most likely due to her hematologic or underlying rheumatologic issues  - No indication for endoscopy per gastroneurology.  No active bleeding at present.      Wesly Walsh MD  [facility]  05/18/18  9:17 AM

## 2018-05-18 NOTE — PLAN OF CARE
"Problem: Patient Care Overview  Goal: Plan of Care Review  Outcome: Ongoing (interventions implemented as appropriate)   05/18/18 2110   Coping/Psychosocial   Plan of Care Reviewed With patient   Plan of Care Review   Progress no change   OTHER   Outcome Summary IVIG given, tolerated. HR increased to 120, feels \"jittery\" states anxiety, Xanax given. /85. Monitor anxiety, HR and BP. Monitor for bleeding,.     Goal: Individualization and Mutuality  Outcome: Ongoing (interventions implemented as appropriate)    Goal: Discharge Needs Assessment  Outcome: Ongoing (interventions implemented as appropriate)      Problem: Fall Risk (Adult)  Goal: Absence of Fall  Outcome: Ongoing (interventions implemented as appropriate)      Problem: Liver Failure, Acute/Chronic (Adult)  Goal: Signs and Symptoms of Listed Potential Problems Will be Absent, Minimized or Managed (Liver Failure, Acute/Chronic)  Outcome: Ongoing (interventions implemented as appropriate)        "

## 2018-05-18 NOTE — PROGRESS NOTES
INFECTIOUS DISEASES PROGRESS NOTE    CC: f/u FUO    S:   Has chronic abdominal pain, not significantly changed  No f/c/ns (had low grade temp during plt infusion)    O:  Physical Exam:  Temp:  [98 °F (36.7 °C)-98.6 °F (37 °C)] 98.4 °F (36.9 °C)  Heart Rate:  [] 96  Resp:  [16-20] 20  BP: (127-142)/(77-90) 129/79  Physical Exam   Constitutional: She appears well-developed. No distress.   Pulmonary/Chest: Effort normal.   Abdominal: Soft. She exhibits no distension. There is tenderness.   Neurological: She is alert.   Skin: Skin is warm and dry.        Diagnostics:     Cr 0.70  WBC 2.01 (p63, L24, m 11)  PLT 4  H/H 9.6/30    glc 323-437    CT chest, personally interpreted: No pneumonia or concerning mass.  Radiology is commented on possible peripancreatic fat represent of pancreatitis    Assessment/Plan   1.  FUO for past 6 months  2.  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  Poorly controlled diabetes mellitus type 2, comp getting above  4.  Abnormal appearance of pancreas on CT abdomen and pelvis    Doubt infectious etiology with negative CT chest abdomen and pelvis and chronicity of infection.  We'll go ahead and check serum cryptococcal antigen as this is described in patients with cirrhosis.  We will also check a Fungitell, hepatitis C and hepatitis B profile.    Given abnormal pancreas on CT Will check pancreatic enzymes.    I think the most likely explanation for her fevers are related to her rheumatologic and hematologic processes.    I will follow results of the above testing peripherally, but will not plan to see daily.  Please call with questions or concerns at [phone]        Shankar Huertas MD  9:37 AM  05/18/18

## 2018-05-18 NOTE — CONSULTS
"Diabetes Education  Assessment/Teaching    Patient Name:  Silvia Zabala  YOB: 1960  MRN: [id number]  Admit Date:  5/16/2018      Assessment Date:  5/18/2018    Most Recent Value   General Information    Referral From:  Database   Height  167.6 cm (66\")   Height Method  Stated   Weight  75.8 kg (167 lb 1.6 oz)   Weight Method  Bed scale   Pregnancy Assessment   Diabetes History   What type of diabetes do you have?  Type 2   Length of Diabetes Diagnosis  10 + years   Current DM knowledge  good   Have you had diabetes education/teaching in the past?  yes   Do you test your blood sugar at home?  yes   Have you had low blood sugar? (<70mg/dl)  yes   How often do you have low blood sugar?  rare   Have you had high blood sugar? (>140mg/dl)  yes   How often do you have high blood sugar?  occasionally   How would you rate your diabetes control?  fair   What makes it difficult for you to take care of your diabetes or yourself?  -- [pt has numerous health issues]   Education Preferences   What areas of diabetes would you like to learn about?  avoiding high blood sugar, avoiding low blood sugar   Nutrition Information   Assessment Topics   Healthy Eating - Assessment  N/A-unable to assess   Being Active - Assessment  N/A- unable to assess   Taking Medication - Assessment  Competent   Problem Solving - Assessment  N/A-unable to assess   Reducing Risk - Assessment  N/A-unable to assess   Healthy Coping - Assessment  N/A-unable to assess   Monitoring - Assessment  Competent   DM Goals            Most Recent Value   DM Education Needs   Meter  Has own   Frequency of Testing  4 times a day   Medication  Insulin [recently has been using U500,working iwith her Endo on this]   Problem Solving  Sick days, Treatment   Healthy Coping  Appropriate   Discharge Plan  Home   Motivation  Moderate   Teaching Method  Discussion   Patient Response  Verbalized understanding            Other Comments:  Discussed diabetes " management,she states she sees Dr Burnett,and had recently started using U500 to try to manage out of control Bg readings.States she has just had so many other issues that she is not able to mange BG very well.She tests at home,takes her meds as prescribed and reports the BG readings back to Dr Burnett.        Electronically signed by:  Teressa Arias RN  05/18/18 3:24 PM

## 2018-05-18 NOTE — PAYOR COMM NOTE
"Silvia Luther (57 y.o. Female)     PLEASE SEE ATTACHED CLINICAL REVIEW. PT.WAS ADMITTED TO [facility] ON  5/16/18 TO A MONITORED TELEM FLOOR.     REF#[id number]    PLEASE CALL   OR  [phone] WITH INPT AUTH AND DAYS APPROVED. [facility] IS DRG WITH [facility].    THANK YOU    SRINI DOMINGO, LPN, CCP    Date of Birth Social Security Number Address Home Phone MRN    1960  [id number]  [place] [address] [phone] [id number]    Rastafari Marital Status          None        Admission Date Admission Type Admitting Provider Attending Provider Department, Room/Bed    5/16/18 Emergency Edling, MD Nico Tate, Wesly Garay MD [address], 440/1    Discharge Date Discharge Disposition Discharge Destination                       Attending Provider:  Wesly Walsh MD    Allergies:  Albuterol, Lactulose, Tramadol, Quinine Derivatives    Isolation:  None   Infection:  None   Code Status:  FULL    Ht:  167.6 cm (66\")   Wt:  75.8 kg (167 lb 1.6 oz)    Admission Cmt:  None   Principal Problem:  Acute ITP [D69.3]                 Active Insurance as of 5/16/2018     Primary Coverage     Payor Plan Insurance Group Employer/Plan Group    [facility] PPO [id number]     Payor Plan Address Payor Plan Phone Number Effective From Effective To    [address] [phone] 1/1/2015     [facility] [id number]       Subscriber Name Subscriber Birth Date Member ID       RADHA LUTHER 7/12/1968 [id number]           Secondary Coverage     Payor Plan Insurance Group Employer/Plan Group    MEDICARE MEDICARE A & B      Payor Plan Address Payor Plan Phone Number Effective From Effective To    [address] [phone] 9/1/2003     [facility] [id number]       Subscriber Name Subscriber Birth Date Member ID       SILVIA LUTHER 1960 [id number]                 Emergency Contacts      (Rel.) " Home Phone Work Phone Mobile Phone    Jose Zabala (Spouse) [phone] -- --    Kary Zabala (Mother In Law) (Relative) [phone] -- --    Mack Lora (Father) -- -- [phone]               History & Physical      Jose Almanza MD at 5/16/2018 10:20 PM                Patient Care Team:  Blanca Pitts MD as PCP - General (Internal Medicine)  Sukhdeep Mcdowell MD as Consulting Physician (Hematology and Oncology)  Blanca Pitts MD as Referring Physician (Internal Medicine)    Chief complaint: Epistaxis    Subjective     History of Present Illness  58yo liver cirrhosis, rheumatoid arthritis, thrombocytopenia, lupus, type 2 diabetes, previous GI bleed from esophageal varices who comes a hospital because 3 days prior to hospitalization she had severe nosebleeds.  She was able to get nosebleeds under control.  And then on the day of admission the patient was vomiting blood.  The patient's unsure if the blood that she now vomiting coming from swallowing blood coming from her nose or if it's coming from a GI source.  The patient has had no more vomiting of blood in the emergency room.  The patient was found to have platelets of 6.  The patient was transfused a unit of platelets in the emergency room.  The oncology team is artery been consult on the patient.    Patient also complains of fevers and chills up to 101 over the last couple months.  The patient is trying to get to a 48 hour time period fever free so that she can start back on her Enbrel.    Patient has a history of seizures or last seizures was many years ago.  Her seizures started in her adult life.  Patient's currently taking Keppra and well controlled on this.    Patient takes Atarax for itching presumably related to the patient's cirrhosis from [place].          Review of Systems   Constitutional: Positive for appetite change (   because of nose), chills and fever. Negative for activity change.   HENT: Positive for dental problem. Negative for  postnasal drip and rhinorrhea.    Respiratory: Negative for apnea and shortness of breath.    Cardiovascular: Positive for chest pain ( epigastric, moderate, today and lasted several hours). Negative for palpitations.   Gastrointestinal: Positive for abdominal distention and abdominal pain.   Endocrine: Positive for cold intolerance. Negative for heat intolerance and polydipsia.   Genitourinary: Negative for difficulty urinating and dysuria.   Musculoskeletal: Positive for arthralgias. Negative for back pain.   Skin: Positive for rash. Negative for color change and pallor.   Neurological: Positive for numbness ( on lyrica for this). Negative for dizziness.   Hematological: Negative for adenopathy.   Psychiatric/Behavioral: Negative for agitation and confusion.        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   • Cirrhosis    • Colon polyps    • Deafness    • Depression    • Diabetes mellitus     Adult onset, insulin requiring   • Duodenal ulcer with hemorrhage    • Esophageal varices determined by endoscopy    • Fibromyalgia    • Gallbladder disease    • Gastric varices    • Gastroparesis    • Glaucoma    • Granuloma annulare    • H/O B12 deficiency    • H/O Bleeding ulcer     And gastroesophageal varices   • H/O mixed connective tissue disorder    • Hemorrhoids    • Hiatal hernia    • History of transfusion    • Hx of being hospitalized     In [place] for GI bleeding from ulcer and varices   • Hyperlipidemia    • Migraine    • DUKES (nonalcoholic steatohepatitis) 11/2016   • BRICE (obstructive sleep apnea)    • Pancreas divisum    • Pancytopenia     Chronic, due to cirrhosis and hypersplenism   • Peptic ulcer disease     And esophageal varices   • PONV (postoperative nausea and vomiting)    • RA (rheumatoid arthritis)    • Seizure  "disorder     LAST ONE SEVERAL YEARS AGO   • Seizures    • Systemic lupus      Past Surgical History:   Procedure Laterality Date   • BLADDER REPAIR  1991   • CATARACT EXTRACTION     • CHOLECYSTECTOMY  1994   • ENDOSCOPY N/A 11/7/2016    Procedure: ESOPHAGOGASTRODUODENOSCOPY WITH COLD POLYPECTOMY, BANDING,  AND CLIPS TIMES 2;  Surgeon: Rich Coleman MD;  Location: [facility] ENDOSCOPY;  Service:    • ENDOSCOPY N/A 12/22/2016    Procedure: ESOPHAGOGASTRODUODENOSCOPY WITH ESOPHAGEAL BANDING. 5 BANDS FIRED, 4 BANDS ADHERERD TO MUCOSA;  Surgeon: Rich Coleman MD;  Location: [facility] ENDOSCOPY;  Service:    • ENDOSCOPY N/A 2/15/2017    Procedure: ESOPHAGOGASTRODUODENOSCOPY AT BEDSIDE with esophageal varices banding (3);  Surgeon: Rich Coleman MD;  Location: [facility] ENDOSCOPY;  Service:    • ENDOSCOPY N/A 4/6/2017    Procedure: ESOPHAGOGASTRODUODENOSCOPY WITH ESOPHAGEAL VARICES BANDING x2;  Surgeon: Rich Coleman MD;  Location: [facility] ENDOSCOPY;  Service:    • ENDOSCOPY N/A 11/24/2017    Procedure: ESOPHAGOGASTRODUODENOSCOPY with biopsies;  Surgeon: Rich Coleman MD;  Location: [facility] ENDOSCOPY;  Service:    • ENDOSCOPY AND COLONOSCOPY  2014    Dr. Rich Coleman with findings of candida esophagitis   • EYE SURGERY     • HYSTERECTOMY  1986    partial   • JOINT REPLACEMENT     • KNEE SURGERY Right 1995    \"right knee recontstruction\"   • LIVER BIOPSY  01/2015   • MASS EXCISION     • STOMACH SURGERY       Family History   Problem Relation Age of Onset   • Liver disease Other    • Depression Other    • Heart disease Other    • Hypertension Other    • Diabetes Other    • Breast cancer Other    • Brain cancer Other    • Uterine cancer Other    • Colon cancer Other    • Leukemia Other    • Colon cancer Maternal Aunt    • Hypertension Mother    • Diabetes type II Mother    • Rheum arthritis Mother    • Liver disease Mother         DUKES   • Heart disease Father    • Diabetes type II Father    • Diabetes type II Sister  "   • Lupus Sister    • Malig Hyperthermia Neg Hx      Social History   Substance Use Topics   • Smoking status: Former Smoker     Packs/day: 0.00     Years: 0.00     Quit date: 12/17/2015   • Smokeless tobacco: Never Used   • Alcohol use No     Prescriptions Prior to Admission   Medication Sig Dispense Refill Last Dose   • ALPRAZolam (XANAX) 0.5 MG tablet TAKE ONE TABLET BY MOUTH TWICE A DAY AS NEEDED FOR ANXIETY 60 tablet 2 Taking   • buPROPion XL (WELLBUTRIN XL) 150 MG 24 hr tablet TAKE ONE TABLET BY MOUTH DAILY 90 tablet 3 Taking   • cholecalciferol (VITAMIN D3) 1000 UNITS tablet Take 1,000 Units by mouth Daily.   Taking   • clotrimazole (MYCELEX) 10 MG omer Take 1 tablet by mouth 5 (Five) Times a Day. 70 tablet 1 Taking   • cycloSPORINE (RESTASIS) 0.05 % ophthalmic emulsion 1 drop 2 (two) times a day.   Taking   • DULoxetine (CYMBALTA) 30 MG capsule TAKE THREE CAPSULES BY MOUTH DAILY 90 capsule 3 Taking   • ferrous sulfate 325 (65 FE) MG tablet TAKE ONE TABLET BY MOUTH TWICE A DAY 60 tablet 1 Taking   • folic acid (FOLVITE) 1 MG tablet Take 1 mg by mouth Daily.   Taking   • furosemide (LASIX) 40 MG tablet Take 1 tablet by mouth Daily. 30 tablet 2    • hydrOXYzine (ATARAX) 25 MG tablet Take 25 mg by mouth 3 (Three) Times a Day As Needed for Itching.   Taking   • Insulin Pen Needle 31G X 5 MM misc To inject 5 -6 times per day 200 each 3 Taking   • Insulin Regular Human, Conc, (HUMULIN R U-500 KWIKPEN) 500 UNIT/ML solution pen-injector CONCENTRATED injection 25 units with each meal plus the sliding scale. Maximum of 50 units per meal. 5 pen 3 Taking   • levETIRAcetam (KEPPRA) 500 MG tablet TAKE ONE TABLET BY MOUTH TWICE A DAY 60 tablet 1 Taking   • LYRICA 75 MG capsule TAKE ONE CAPSULE BY MOUTH TWICE A DAY 60 capsule 0 Taking   • Multiple Vitamin (MULTI-VITAMIN PO) Take 1 tablet by mouth Daily.   Taking   • ONE TOUCH ULTRA TEST test strip USE FOR TESTING FOUR TIMES A  each 4 Taking   • OxyCODONE HCl  (OXAYDO) 7.5 MG tablet  Take 7.5 mg by mouth Every 8 (Eight) Hours As Needed.   Taking   • pantoprazole (PROTONIX) 40 MG EC tablet TAKE ONE TABLET BY MOUTH DAILY 30 tablet 4 Taking   • promethazine (PHENERGAN) 25 MG tablet TAKE ONE TABLET BY MOUTH EVERY 6 HOURS AS NEEDED FOR NAUSEA OR VOMITING 30 tablet 0 Taking   • RELISTOR 150 MG tablet 150 mg Daily.   Taking   • spironolactone (ALDACTONE) 100 MG tablet TAKE ONE TABLET BY MOUTH DAILY 30 tablet 4 Taking   • sucralfate (CARAFATE) 1 g tablet Take 1 tablet by mouth 2 (Two) Times a Day. 60 tablet 2    • trimethobenzamide (TIGAN) 300 MG capsule Take 300 mg by mouth 3 (Three) Times a Day.   Taking   • vitamin B-12 (CYANOCOBALAMIN) 1000 MCG tablet Take 1,000 mcg by mouth daily.   Taking   • XIFAXAN 550 MG tablet TAKE ONE TABLET BY MOUTH TWICE A DAY 60 tablet 4 Taking     Allergies:  Albuterol; Lactulose; Tramadol; and Quinine derivatives    Objective      Vital Signs  Temp:  [99.2 °F (37.3 °C)-99.4 °F (37.4 °C)] 99.2 °F (37.3 °C)  Heart Rate:  [106-112] 108  Resp:  [16-20] 16  BP: (135-143)/(73-83) 135/73    Physical Exam   Constitutional: She is oriented to person, place, and time. She appears well-developed and well-nourished.   HENT:   Head: Normocephalic and atraumatic.   Mouth/Throat: No oropharyngeal exudate.   Eyes: EOM are normal. Pupils are equal, round, and reactive to light.   Neck: Normal range of motion. Neck supple. No tracheal deviation present. No thyromegaly present.   Cardiovascular: Regular rhythm.  Exam reveals no gallop and no friction rub.    No murmur heard.  Mildly tachycardic   Pulmonary/Chest: Effort normal and breath sounds normal. No respiratory distress. She has no wheezes.   Abdominal: Soft. Bowel sounds are normal. She exhibits no distension. There is tenderness ( mild).   Musculoskeletal: Normal range of motion. She exhibits no edema or deformity.   Neurological: She is alert and oriented to person, place, and time.   Skin: Skin is warm and  "dry. Rash noted. No erythema. No pallor.   Psychiatric: She has a normal mood and affect. Her behavior is normal.       Results Review:   I reviewed the patient's new clinical results.  I reviewed the patient's new imaging results and agree with the interpretation.      Assessment/Plan     Principal Problem:    Thrombocytopenia  Active Problems:    Cirrhosis of liver    Rheumatoid arthritis    Anxiety and depression    Type 2 diabetes mellitus, uncontrolled, with neuropathy    Secondary esophageal varices without bleeding    Secondary esophageal varices with bleeding      Assessment & Plan      Thrombocytopenia  -transfuse  -hematology consult    GI bleed mostly impart to low platelets  -protonix      Cirrhosis of liver  -GI consult    Fevers recurring  -crp  -esr  -hiv  -blood cultures  -patient states already tested for hep b,c      Rheumatoid arthritis  -unable to take embrel since December because of fevers      Anxiety and depression      Type 2 diabetes mellitus, uncontrolled, with neuropathy  -ssi  -takes 40 units with meals and a ssi      Secondary esophageal varices with bleeding        I discussed the patients findings and my recommendations with patient, family and consulting provider    Jose Almanza MD  05/16/18  10:20 PM    Time:     Electronically signed by Jose Almanza MD at 5/17/2018  2:44 AM          Emergency Department Notes      Jacquelyn Cha RN at 5/16/2018  7:00 PM        Pt complains of nose bleed earlier this morning. States that it was lasted \"at least 3 hours\" . Pt complains of dizziness and she \"feels like I'm choking on blood\". Hx of GI bleeding from liver disease. Sees Dr. Michelle at [facility] No active bleeding at this time. Denies taking blood thinners    Electronically signed by Jacquelyn Cha RN at 5/16/2018  7:04 PM     Mauro Moore MD at 5/16/2018  7:14 PM           EMERGENCY DEPARTMENT ENCOUNTER    CHIEF COMPLAINT  Chief Complaint: Epistaxis, " "dizziness  History given by: Pt  History limited by: nothing  Room Number: 05/05  PMD: Blanca Pitts MD      HPI:  Pt is a 57 y.o. female who presents complaining of intermittent episodes of epistaxis which began \"early this morning\" and bled for \"at least three hours.\" Pt states that she stuffed her nares with tissue and laid her head back to stop active bleeding. Pt also complains of hemoptysis with blood clots, hematemesis, and dizziness. Pt admits to a similar previous episode four days ago. Pt is not actively bleeding at this time and is not on any anticoagulants.     Duration:  \"at least three hours\"  Onset: sudden  Timing: intermittent   Location: bilateral nares  Quality: epistaxis  Intensity/Severity: mild  Progression: improved, no active bleeding at this time  Associated Symptoms:  hemoptysis with blood clots, hematemesis, and dizziness  Aggravating Factors: none mentioned  Alleviating Factors: none mentioned  Previous Episodes: Similar episode four days ago  Treatment before arrival: Pt states that she stuffed her nares with tissue and laid her head back to stop active bleeding.  ALLERGIES  Albuterol; Lactulose; Tramadol; and Quinine derivatives    REVIEW OF SYSTEMS  Review of Systems   Constitutional: Negative for fever.   HENT: Positive for nosebleeds.    Respiratory: Positive for cough (hemoptysis). Negative for shortness of breath.    Cardiovascular: Negative for chest pain.   Gastrointestinal: Positive for vomiting (hematemesis ).   Neurological: Positive for dizziness.       PHYSICAL EXAM  ED Triage Vitals [05/16/18 1905]   Temp Heart Rate Resp BP SpO2   99.4 °F (37.4 °C) 112 20 141/81 98 %      Temp src Heart Rate Source Patient Position BP Location FiO2 (%)   Tympanic -- -- -- --       Physical Exam   Constitutional: She is oriented to person, place, and time and well-developed, well-nourished, and in no distress.   HENT:   Nose: No epistaxis (minimal dry blood, no active bleeding).   Eyes: EOM " are normal.   Neck: Normal range of motion. Neck supple.   Cardiovascular: Normal rate and regular rhythm.  Exam reveals no gallop.    No murmur heard.  Pulmonary/Chest: Effort normal and breath sounds normal. No respiratory distress.   Abdominal: Bowel sounds are normal. She exhibits no distension and no mass. There is tenderness (chronic, unchanged).   Musculoskeletal: Normal range of motion.   Neurological: She is alert and oriented to person, place, and time. She has normal sensation and normal strength.   Skin: Skin is warm and dry.   Psychiatric: Affect normal.   Nursing note and vitals reviewed.  PROCEDURES  Procedures      PROGRESS AND CONSULTS      1919: Ordered blood work, aPTT, protime-INR for further evaluation.     1920: Notified pt of plan to order labs and imaging for further evaluation.     2036: Ordered CBC for further evaluation.     2106: Ordered Zofran.     2147: Placed call to A for admission.     2147: Ordered platelets for pt's low platelet count.     2153: Pt rechecked. Pt resting comfortably and in no acute distress. Notified pt of lab results including low platelet count. Notified pt of plan to administer platelets and admit pt. Pt understands and agrees with the plan, all questions answered.    2157: Placed call to hematology for consult.     2220: Discussed pt's case with Dr. Almanza, [facility], who agrees to admit pt.    2238: Discussed pt's case with Dr. Rajput, hematology, who requests a repeat CBC.     2242: Ordered repeat CBC per Dr. Rajput's request.     2300: Discussed pt;s case with Dr. Rajput, hematology, who states to cease platelet transfusion and start tylenol and  Benadryl for pt's fever. States after 30 minutes or after pt's fever ceases, pt can be started back on platelet transfusion.    MEDICAL DECISION MAKING  Results were reviewed/discussed with the patient and they were also made aware of online access. Pt also made aware that some labs, such as cultures, will not be resulted  during ER visit and follow up with PMD is necessary.     MDM  Number of Diagnoses or Management Options  Nosebleed:   Thrombocytopenia:   Diagnosis management comments: Pt presents complaining of intermittent epistaxis episodes which first occurred four days ago. Upon arrival, pt is not having any active bleeding. Pt is not on any anticoagulants.Testing revealed that pt's platelet count is 6,000 today and was 107,000 six days ago. Pt will be admitted for thrombocytopenia and epistaxis. Pt will receive platelets upon admission.        Amount and/or Complexity of Data Reviewed  Clinical lab tests: ordered and reviewed (Platelets=6  Protime= 15.6  INR=1.26  WBC=3.36)  Decide to obtain previous medical records or to obtain history from someone other than the patient: yes  Discuss the patient with other providers: yes (Dr. Almanza, [facility]  Dr. Rajput, hematology)  Independent visualization of images, tracings, or specimens: yes    Patient Progress  Patient progress: stable         DIAGNOSIS  Final diagnoses:   Thrombocytopenia   Nosebleed       DISPOSITION  ADMISSION    Discussed treatment plan and reason for admission with pt/family and admitting physician.  Pt/family voiced understanding of the plan for admission for further testing/treatment as needed.     Latest Documented Vital Signs:  As of 11:16 PM  BP- 114/71 HR- 103 Temp- 100.4 °F (38 °C)  O2 sat- 98%  --    Documentation assistance provided by tori Martell for Dr. Moore.  Information recorded by the scribe was done at my direction and has been verified and validated by me.           Juan J Martell  05/16/18 5287       Mauro Moore MD  05/16/18 1989      Electronically signed by Mauro Moore MD at 5/16/2018 11:22 PM       Intake & Output (last day)       05/17 0701 - 05/18 0700 05/18 0701 - 05/19 0700    P.O. 120     Blood      Total Intake(mL/kg) 120 (1.6)     Net +120            Unmeasured Urine Occurrence 2 x         Lab Results (all)     Procedure  Component Value Units Date/Time    POC Glucose Once [[id number]]  (Abnormal) Collected:  05/18/18 0634    Specimen:  Blood Updated:  05/18/18 0635     Glucose 334 (H) mg/dL     Narrative:       Meter: [id number] : [id number] Conner JOE    Basic Metabolic Panel [[id number]]  (Abnormal) Collected:  05/18/18 0425    Specimen:  Blood Updated:  05/18/18 0538     Glucose 323 (H) mg/dL      BUN 13 mg/dL      Creatinine 0.70 mg/dL      Sodium 136 mmol/L      Potassium 4.4 mmol/L      Chloride 100 mmol/L      CO2 23.6 mmol/L      Calcium 8.8 mg/dL      eGFR Non African Amer 87 mL/min/1.73      BUN/Creatinine Ratio 18.6     Anion Gap 12.4 mmol/L     Narrative:       CBC & Differential [[id number]] Collected:  05/18/18 0425    Specimen:  Blood Updated:  05/18/18 0522    Narrative:       CBC Auto Differential [[id number]]  (Abnormal) Collected:  05/18/18 0425    Specimen:  Blood Updated:  05/18/18 0522     WBC 2.01 (L) 10*3/mm3      RBC 3.51 (L) 10*6/mm3      Hemoglobin 9.6 (L) g/dL      Hematocrit 29.9 (L) %      MCV 85.2 fL      MCH 27.4 pg      MCHC 32.1 (L) g/dL      RDW 15.4 (H) %      RDW-SD 48.0 fl      MPV -- fL      Comment: .        Platelets 4 (C) 10*3/mm3      Neutrophil % 63.2 %      Lymphocyte % 24.4 %      Monocyte % 11.4 %      Eosinophil % 0.5 %      Basophil % 0.5 %      Immature Grans % 1.0 (H) %      Neutrophils, Absolute 1.27 (L) 10*3/mm3      Lymphocytes, Absolute 0.49 (L) 10*3/mm3      Monocytes, Absolute 0.23 10*3/mm3      Eosinophils, Absolute 0.01 10*3/mm3      Basophils, Absolute 0.01 10*3/mm3      Immature Grans, Absolute 0.02 10*3/mm3      nRBC 0.0 /100 WBC     Blood Culture - Blood, [[id number]]  (Normal) Collected:  05/17/18 0054    Specimen:  Blood from Arm, Left Updated:  05/18/18 0100     Blood Culture No growth at 24 hours    Blood Culture - Blood, [[id number]]  (Normal) Collected:  05/17/18 0049    Specimen:  Blood from Arm, Right Updated:  05/18/18 0100     Blood Culture No growth at 24  hours    POC Glucose Once [[id number]]  (Abnormal) Collected:  05/17/18 2135    Specimen:  Blood Updated:  05/17/18 2136     Glucose 347 (H) mg/dL     Narrative:       Meter: [id number] : [id number] Conner JOE    POC Glucose Once [[id number]]  (Abnormal) Collected:  05/17/18 1651    Specimen:  Blood Updated:  05/17/18 1653     Glucose 437 (H) mg/dL     Narrative:       RN Notified R and V Meter: [id number] : [id number] Tj Kary ANGELO CNA    TSPOT [[id number]] Collected:  05/17/18 0049    Specimen:  Blood Updated:  05/17/18 1425     TSPOTTB Non-reportable due to insufficient cells.  Need 2x10e6 of peripheral blood mononuclear cells (PBMC) from whole blood. Recommend recollection of specimen. Two full tubes required for retest.     T-SPOT Panel A 0     T-SPOT Panel B 0     TSPOT NIL CONTROL INTERP Failed     TSPOT POS CONTROL INTERP Failed    POC Glucose Once [[id number]]  (Abnormal) Collected:  05/17/18 1057    Specimen:  Blood Updated:  05/17/18 1058     Glucose 493 (C) mg/dL     Narrative:       Repeat Test Meter: [id number] : [id number] Mil JOE    POC Glucose Once [[id number]]  (Abnormal) Collected:  05/17/18 0614    Specimen:  Blood Updated:  05/17/18 0615     Glucose 303 (H) mg/dL     Narrative:       Meter: [id number] : [id number] Conner JOE    Sedimentation Rate [[id number]]  (Abnormal) Collected:  05/17/18 0358    Specimen:  Blood Updated:  05/17/18 0534     Sed Rate 44 (H) mm/hr     CBC & Differential [[id number]] Collected:  05/17/18 0358    Specimen:  Blood Updated:  05/17/18 0513    Narrative:       CBC Auto Differential [[id number]]  (Abnormal) Collected:  05/17/18 0358    Specimen:  Blood Updated:  05/17/18 0513     WBC 3.09 (L) 10*3/mm3      RBC 3.87 (L) 10*6/mm3      Hemoglobin 10.7 (L) g/dL      Hematocrit 33.4 (L) %      MCV 86.3 fL      MCH 27.6 pg      MCHC 32.0 (L) g/dL      RDW 16.0 (H) %      RDW-SD 50.1 fl      MPV -- fL      Comment: .        Platelets 4 (C) 10*3/mm3       Neutrophil % 59.5 %      Lymphocyte % 25.6 %      Monocyte % 8.7 %      Eosinophil % 5.2 %      Basophil % 1.0 %      Immature Grans % 0.3 %      Neutrophils, Absolute 1.84 (L) 10*3/mm3      Lymphocytes, Absolute 0.79 (L) 10*3/mm3      Monocytes, Absolute 0.27 10*3/mm3      Eosinophils, Absolute 0.16 10*3/mm3      Basophils, Absolute 0.03 10*3/mm3      Immature Grans, Absolute 0.01 10*3/mm3      nRBC 0.0 /100 WBC     Basic Metabolic Panel [[id number]]  (Abnormal) Collected:  05/17/18 0358    Specimen:  Blood Updated:  05/17/18 0510     Glucose 207 (H) mg/dL      BUN 7 mg/dL      Creatinine 0.72 mg/dL      Sodium 139 mmol/L      Potassium 4.1 mmol/L      Chloride 102 mmol/L      CO2 24.7 mmol/L      Calcium 9.0 mg/dL      eGFR Non African Amer 84 mL/min/1.73      BUN/Creatinine Ratio 9.7     Anion Gap 12.3 mmol/L     Narrative:       C-reactive Protein [[id number]]  (Normal) Collected:  05/17/18 0358    Specimen:  Blood Updated:  05/17/18 0510     C-Reactive Protein 0.42 mg/dL     Lactate Dehydrogenase [[id number]]  (Abnormal) Collected:  05/17/18 0049    Specimen:  Blood Updated:  05/17/18 0309      (H) U/L     Troponin [[id number]]  (Normal) Collected:  05/17/18 0049    Specimen:  Blood Updated:  05/17/18 0309     Troponin T <0.010 ng/mL     Narrative:       HIV-1 & HIV-2 Antibodies [[id number]] Collected:  05/17/18 0049    Specimen:  Blood Updated:  05/17/18 0236    Narrative:       The following orders were created for panel order HIV-1 & HIV-2 Antibodies.  Procedure                               Abnormality         Status                     ---------                               -----------         ------                     HIV-1 / O / 2 Ag / Antib...[[id number]]  Normal              Final result                 Please view results for these tests on the individual orders.    HIV-1 / O / 2 Ag / Antibody 4th Generation [[id number]]  (Normal) Collected:  05/17/18 0049    Specimen:  Blood Updated:  05/17/18  0236     HIV-1/ HIV-2 Non-Reactive     HIV-1 P24 Ag Screen Non-Reactive    POC Glucose Once [[id number]]  (Abnormal) Collected:  05/17/18 0158    Specimen:  Blood Updated:  05/17/18 0200     Glucose 207 (H) mg/dL     Narrative:       Meter: [id number] : [id number] Conner JOE    Vitamin B12 [[id number]]  (Normal) Collected:  05/17/18 0049    Specimen:  Blood Updated:  05/17/18 0147     Vitamin B-12 939 pg/mL     Folate [[id number]]  (Normal) Collected:  05/17/18 0049    Specimen:  Blood Updated:  05/17/18 0147     Folate 19.22 ng/mL     CBC & Differential [[id number]] Collected:  05/17/18 0058    Specimen:  Blood Updated:  05/17/18 0144    Narrative:       CBC Auto Differential [[id number]]  (Abnormal) Collected:  05/17/18 0058    Specimen:  Blood Updated:  05/17/18 0144     WBC 2.78 (L) 10*3/mm3      RBC 3.78 (L) 10*6/mm3      Hemoglobin 10.4 (L) g/dL      Hematocrit 32.7 (L) %      MCV 86.5 fL      MCH 27.5 pg      MCHC 31.8 (L) g/dL      RDW 15.9 (H) %      RDW-SD 50.4 fl      MPV -- fL      Comment: .        Platelets 8 (C) 10*3/mm3      Neutrophil % 61.9 %      Lymphocyte % 22.7 %      Monocyte % 11.5 %      Eosinophil % 3.2 %      Basophil % 0.7 %      Immature Grans % 0.4 %      Neutrophils, Absolute 1.72 (L) 10*3/mm3      Lymphocytes, Absolute 0.63 (L) 10*3/mm3      Monocytes, Absolute 0.32 10*3/mm3      Eosinophils, Absolute 0.09 10*3/mm3      Basophils, Absolute 0.02 10*3/mm3      Immature Grans, Absolute 0.01 10*3/mm3      nRBC 0.0 /100 WBC     [facility] Comprehensive Panel [[id number]] Collected:  05/17/18 0049    Specimen:  Blood Updated:  05/17/18 0101    Rheumatoid Factor [[id number]]  (Abnormal) Collected:  05/16/18 1943    Specimen:  Blood Updated:  05/17/18 0024     Rheumatoid Factor Quantitative 20.9 (H) IU/mL     Immature Platelet Fraction [[id number]]  (Abnormal) Collected:  05/16/18 2042    Specimen:  Blood Updated:  05/16/18 2332     IPF 18.40 (H) %      Platelets 3 (C) 10*3/mm3     Fibrinogen  [[id number]]  (Normal) Collected:  05/16/18 1943    Specimen:  Blood Updated:  05/16/18 2325     Fibrinogen 357 mg/dL     CBC & Differential [[id number]] Collected:  05/16/18 2042    Specimen:  Blood Updated:  05/16/18 2140    Narrative:       CBC Auto Differential [[id number]]  (Abnormal) Collected:  05/16/18 2042    Specimen:  Blood Updated:  05/16/18 2140     WBC 2.36 (L) 10*3/mm3      RBC 4.11 10*6/mm3      Hemoglobin 11.4 (L) g/dL      Hematocrit 35.6 %      MCV 86.6 fL      MCH 27.7 pg      MCHC 32.0 (L) g/dL      RDW 15.8 (H) %      RDW-SD 50.2 fl      MPV -- fL      Comment: .         Platelets 6 (C) 10*3/mm3      Neutrophil % 62.3 %      Lymphocyte % 23.3 %      Monocyte % 8.9 %      Eosinophil % 4.7 %      Basophil % 0.8 %      Immature Grans % 0.4 %      Neutrophils, Absolute 1.47 (L) 10*3/mm3      Lymphocytes, Absolute 0.55 (L) 10*3/mm3      Monocytes, Absolute 0.21 10*3/mm3      Eosinophils, Absolute 0.11 10*3/mm3      Basophils, Absolute 0.02 10*3/mm3      Immature Grans, Absolute 0.01 10*3/mm3      nRBC 0.0 /100 WBC     Scan Slide [[id number]]  (Normal) Collected:  05/16/18 2042    Specimen:  Blood Updated:  05/16/18 2140     RBC Morphology Normal     WBC Morphology Normal     Platelet Morphology Normal    Protime-INR [[id number]]  (Abnormal) Collected:  05/16/18 1943    Specimen:  Blood Updated:  05/16/18 2025     Protime 15.6 (H) Seconds      INR 1.26 (H)    aPTT [[id number]]  (Normal) Collected:  05/16/18 1943    Specimen:  Blood Updated:  05/16/18 2025     PTT 30.2 seconds     Comprehensive Metabolic Panel [[id number]]  (Abnormal) Collected:  05/16/18 1943    Specimen:  Blood Updated:  05/16/18 2022     Glucose 347 (H) mg/dL      BUN 6 mg/dL      Creatinine 0.77 mg/dL      Sodium 140 mmol/L      Potassium 4.3 mmol/L      Chloride 101 mmol/L      CO2 24.4 mmol/L      Calcium 8.7 mg/dL      Total Protein 6.8 g/dL      Albumin 3.40 (L) g/dL      ALT (SGPT) 23 U/L      AST (SGOT) 45 (H) U/L      Alkaline  Phosphatase 125 (H) U/L      Total Bilirubin 0.8 mg/dL      eGFR Non African Amer 78 mL/min/1.73      Globulin 3.4 gm/dL      A/G Ratio 1.0 g/dL      BUN/Creatinine Ratio 7.8     Anion Gap 14.6 mmol/L           Imaging Results (all)     Procedure Component Value Units Date/Time    CT Chest Without Contrast [[id number]] Collected:  05/17/18 1433     Updated:  05/17/18 1441    Narrative:       CT CHEST WO CONTRAST-     CLINICAL HISTORY: Dyspnea. Chest pain. Fever.     TECHNIQUE: Spiral CT images were obtained through the chest without IV  contrast and were reconstructed in 3 mm thick slices.     Radiation dose reduction techniques were utilized, including automated  exposure control and exposure modulation based on body size.     COMPARISON: Chest x-ray dated 3/30/2018.     FINDINGS: There is minimal pleural and parenchymal scarring at the left  lung apex. The lungs are otherwise clear. There are no infiltrates or  discrete lung masses. There is no mediastinal or hilar or axillary  lymphadenopathy. There are no pleural effusions. Images through the  upper abdomen show mild diffuse injection of the peripancreatic fat that  could indicate acute pancreatitis. Correlation with clinical findings is  recommended.       Impression:       Unremarkable CT scan of the chest except for equivocal  evidence of pancreatitis in the upper abdomen as noted.     This report was finalized on 5/17/2018 2:38 PM by Dr. Marco Ramos M.D.             Orders (all)     Start     Ordered    05/17/18 1800  Dietary Nutrition Supplements Glucerna Shake  Daily With Breakfast, Lunch & Dinner     Comments:  Chocolate-please bring 2 glucerna with dinner for patient to have Q HS    05/17/18 1419    05/17/18 1115  Inpatient Endocrinology Consult  Once     Specialty:  Endocrinology  Provider:  Merary Burnett MD    05/17/18 1114    05/17/18 0700  POC Glucose 4x Daily AC & at Bedtime  4 Times Daily Before Meals & at Bedtime      05/16/18 1165     05/17/18 0523  Inpatient Nutrition Consult  Once     Provider:  (Not yet assigned)    05/17/18 0523 05/17/18 0458  Scan Slide  Once,   Status:  Canceled      05/17/18 0457 05/17/18 0302  Do NOT Hold Basal Insulin When Patient is NPO, Hold Bolus Dose if NPO  Continuous      05/17/18 0301 05/17/18 0302  Follow [facility] Hypoglycemia Protocol For Blood Glucose Less Than 70 mg/dL  Until Discontinued      05/17/18 0301 05/17/18 0302  Hypoglycemia Treatment - Alert Patient That is Not NPO and Can Safely Swallow  Until Discontinued     Comments:  Administer 4 oz Fruit Juice OR 4 oz Regular Soda OR 8 oz Milk OR 15-30 grams (1 tube) of Glucose Gel.  Recheck Blood Glucose 15 Minutes After Ingestion, Repeat Treatment & Continue to Recheck Blood Sugar Every 15 Minutes Until Blood Glucose is 70 mg/dL or Higher.  Once Blood Glucose is 70 mg/dL or Higher and if It Will Be more than 60 Minutes Until the Next Meal, Provide Appropriate Snack (Including Carbohydrate Food) Based on Meal Plan Order. Give Meal Tray As Soon As Possible.    05/17/18 0301 05/17/18 0302  Hypoglycemia Treatment - Patient Has IV Access - Unresponsive, NPO or Unable To Safely Swallow  Until Discontinued     Comments:  Administer 25g (50ml) D50W IV Push.  Recheck Blood Glucose 15 Minutes After Administration, if Blood Glucose Remains Less Than 70 mg/dl, Repeat Treatment   Recheck Blood Glucose 15 Minutes After 2nd Administration, if Blood Glucose Remains Less Than 70 mg/dL After 2nd Dose of D50W, Contact Provider for Further Treatment Orders & Consider Adding IVF With D5W for Maintenance    05/17/18 0301 05/17/18 0302  Hypoglycemia Treatment - Patient Without IV Access - Unresponsive, NPO or Unable To Safely Swallow  Until Discontinued     Comments:  Administer 1mg Glucagon SQ & Establish IV Access.  Turn Patient on Side - Nausea / Vomiting May Occur.  Recheck Blood Glucose 15 Minutes After Administration.  If Blood Glucose Remains Less Than 70,  Administer 25g D50W IV Push (50ml).  Recheck Blood Glucose 15 Minutes After Administration of D50W, if Blood Glucose Remains Less Than 70 mg/dl, Contact Provider for Further Treatment Orders & Consider Adding IVF With D5 for Maintenance    05/17/18 0301    05/17/18 0023  Inpatient Diabetes Educator Consult  Once     Provider:  (Not yet assigned)    05/17/18 0023    05/17/18 0000  Strict Intake and Output  Every Hour      05/16/18 2327 05/16/18 2335  Inpatient Infectious Diseases Consult  Once     Specialty:  Infectious Diseases  Provider:  Edd Piña MD    05/16/18 2336    05/16/18 2334  HIV-1 & HIV-2 Antibodies  Once      05/16/18 2333    05/16/18 2334  HIV-1 / O / 2 Ag / Antibody 4th Generation  PROCEDURE ONCE      05/16/18 2333    05/16/18 2330  Do NOT Hold Basal Insulin When Patient is NPO, Hold Bolus Dose if NPO  Continuous      05/16/18 2329 05/16/18 2330  Follow [facility] Hypoglycemia Protocol For Blood Glucose Less Than 70 mg/dL  Until Discontinued      05/16/18 2329 05/16/18 2330  Hypoglycemia Treatment - Alert Patient That is Not NPO and Can Safely Swallow  Until Discontinued     Comments:  Administer 4 oz Fruit Juice OR 4 oz Regular Soda OR 8 oz Milk OR 15-30 grams (1 tube) of Glucose Gel.  Recheck Blood Glucose 15 Minutes After Ingestion, Repeat Treatment & Continue to Recheck Blood Sugar Every 15 Minutes Until Blood Glucose is 70 mg/dL or Higher.  Once Blood Glucose is 70 mg/dL or Higher and if It Will Be more than 60 Minutes Until the Next Meal, Provide Appropriate Snack (Including Carbohydrate Food) Based on Meal Plan Order. Give Meal Tray As Soon As Possible.    05/16/18 2329 05/16/18 2330  Hypoglycemia Treatment - Patient Has IV Access - Unresponsive, NPO or Unable To Safely Swallow  Until Discontinued     Comments:  Administer 25g (50ml) D50W IV Push.  Recheck Blood Glucose 15 Minutes After Administration, if Blood Glucose Remains Less Than 70 mg/dl, Repeat Treatment   Recheck  Blood Glucose 15 Minutes After 2nd Administration, if Blood Glucose Remains Less Than 70 mg/dL After 2nd Dose of D50W, Contact Provider for Further Treatment Orders & Consider Adding IVF With D5W for Maintenance    05/16/18 2329 05/16/18 2330  Hypoglycemia Treatment - Patient Without IV Access - Unresponsive, NPO or Unable To Safely Swallow  Until Discontinued     Comments:  Administer 1mg Glucagon SQ & Establish IV Access.  Turn Patient on Side - Nausea / Vomiting May Occur.  Recheck Blood Glucose 15 Minutes After Administration.  If Blood Glucose Remains Less Than 70, Administer 25g D50W IV Push (50ml).  Recheck Blood Glucose 15 Minutes After Administration of D50W, if Blood Glucose Remains Less Than 70 mg/dl, Contact Provider for Further Treatment Orders & Consider Adding IVF With D5 for Maintenance    05/16/18 2329 05/16/18 2330  Notify Provider of event. Communicate needs and document in EMR.  Once      05/16/18 2329 05/16/18 2330  Document event and patients response to treatment in EMR, any medications given to be documented on MAR.  Once      05/16/18 2329 05/16/18 2329  dextrose (D50W) solution 25 g  Every 15 Minutes PRN      05/16/18 2329 05/16/18 2328  Diet Regular; Low Fiber / Low Residue, Consistent Carbohydrate, GI Soft / [place]  Diet Effective Now      05/16/18 2327 05/16/18 2327  Full Code  Continuous      05/16/18 2327 05/16/18 2327  Weigh patient  Once      05/16/18 2327 05/16/18 2327  Oxygen Therapy- Nasal Cannula; Titrate for SPO2: 90%  Continuous      05/16/18 2327 05/16/18 2326  HYDROcodone-acetaminophen (NORCO) 5-325 MG per tablet 1 tablet  Every 4 Hours PRN,   Status:  Discontinued      05/16/18 2327 05/16/18 2326  Hematology & Oncology Inpatient Consult  Once     Specialty:  Hematology and Oncology  Provider:  Kervin Rajput Jr., MD    05/16/18 2327 05/16/18 2326  Inpatient Gastroenterology Consult  Once     Specialty:  Gastroenterology  Provider:  Rich GONZALEZ  "MD [place]    18  Hematology and Oncology (on-call MD unless specified)  Once     Specialty:  Hematology and Oncology  Provider:  Kervin Rajput Jr., MD    18  Repeat CBC one hour after first unit of platelets are transfused. Call results to Dr. De Souza Hematologist.  [facility] (Specify)  Once     Comments:  Repeat CBC one hour after first unit of platelets are transfused. Call results to Dr. De Souza Hematfarida.    18 [id number]  Inpatient Admission  Once      18 215  Hematology and Oncology (on-call MD unless specified)  Once     Specialty:  Hematology and Oncology  Provider:  (Not yet assigned)    18  Tele Bed Request  Once      18  [facility] (on-call MD unless specified)  Once     Specialty:  Internal Medicine  Provider:  Jose Almanza MD    18        Physician Progress Notes (all)      Wesly Walsh MD at 2018  9:17 AM           LOS: 2 days     Name: Silvia Zabala  Age: 57 y.o.  Sex: female  :  1960  MRN: [id number]         Primary Care Physician: Blanca Pitts MD    Subjective   Subjective  States that she feels quite fatigued today but has no other new complaints.    Objective   Vital Signs  Temp:  [98 °F (36.7 °C)-98.6 °F (37 °C)] 98.4 °F (36.9 °C)  Heart Rate:  [] 96  Resp:  [16-20] 20  BP: (127-142)/(77-90) 129/79  Body mass index is 26.97 kg/m².    Objective:  General Appearance:  Comfortable and in no acute distress.    Vital signs: (most recent): Blood pressure 129/79, pulse 96, temperature 98.4 °F (36.9 °C), temperature source Oral, resp. rate 20, height 167.6 cm (66\"), weight 75.8 kg (167 lb 1.6 oz), SpO2 97 %.    Lungs:  Normal effort and normal respiratory rate.    Heart: Normal rate.  Regular rhythm.    Abdomen: Abdomen is soft.  Bowel sounds are normal.   There is no abdominal tenderness.   "   Extremities: There is no dependent edema or local swelling.    Neurological: Patient is alert and oriented to person, place and time.    Skin:  Warm and dry.              Assessment/Plan   Principal Problem:    ITP  Active Problems:    Cirrhosis of liver    Rheumatoid arthritis    Anxiety and depression    Type 2 diabetes mellitus, uncontrolled, with neuropathy    DUKES (nonalcoholic steatohepatitis)    Hematemesis    Systemic lupus    Secondary esophageal varices without bleeding    Fever      Assessment & Plan    - She will continue prednisone and IVIG for treatment of her ITP.  Hematology managing.  No active bleeding at present.  - She has been started on aggressive insulin regimen per endocrinology  - No evidence of infectious process.  CT scan of the chest unremarkable.  Intermittent fevers most likely due to her hematologic or underlying rheumatologic issues  - No indication for endoscopy per gastroneurology.  No active bleeding at present.      Wesly Walsh MD  [facility]  05/18/18  9:17 AM      Electronically signed by Wesly Walsh MD at 5/18/2018  9:21 AM     Gerson Olson II, MD at 5/18/2018  8:04 AM            Subjective      REASON FOR FOLLOWUP/CHIEF COMPLAINT:  ITP    HISTORY OF PRESENT ILLNESS:   No new events overnight.  No bleeding-including no epistaxis.  No bruising other than a bruise on her abdomen after subcutaneous injection.    Past Medical History, Past Surgical History, Social History, Family History have been reviewed and are without significant changes except as mentioned.    Review of Systems   Review of Systems   Constitutional: Negative for activity change.   HENT: Negative for nosebleeds and trouble swallowing.    Respiratory: Negative for shortness of breath and wheezing.    Cardiovascular: Negative for chest pain and palpitations.   Gastrointestinal: Negative for constipation, diarrhea and nausea.   Genitourinary: Negative for  "dysuria and hematuria.   Musculoskeletal: Negative for arthralgias and myalgias.   Skin: Negative for rash and wound.   Neurological: Negative for seizures and syncope.   Hematological: Negative for adenopathy. Does not bruise/bleed easily.   Psychiatric/Behavioral: Negative for confusion.       Medications:  The current medication list was reviewed in the EMR    ALLERGIES:    Allergies   Allergen Reactions   • Albuterol Anaphylaxis   • Lactulose Nausea And Vomiting and Other (See Comments)     Severe abdominal pain   • Tramadol Other (See Comments)     Per pt causes her \"palsy, meaning shaking and tremors\" and gi upset, nausea   • Quinine Derivatives Nausea And Vomiting       Objective       Vitals:    05/17/18 1720 05/17/18 2109 05/18/18 0043 05/18/18 0738   BP: 131/90 140/83 142/87 129/79   BP Location:  Left arm Left arm Right arm   Patient Position:  Lying Lying Lying   Pulse: 113 94 104 96   Resp:  16 16 20   Temp:  98.6 °F (37 °C) 98.4 °F (36.9 °C) 98.4 °F (36.9 °C)   TempSrc:  Oral Oral Oral   SpO2:    97%   Weight:       Height:         Physical Exam    CONSTITUTIONAL:  Vital signs reviewed.  No distress, looks comfortable.  EYES:  Conjunctiva and lids unremarkable.  PERRLA  EARS,NOSE,MOUTH,THROAT:  Ears and nose appear unremarkable.  Lips, teeth, gums appear unremarkable.  RESPIRATORY:  Normal respiratory effort.  Lungs clear to auscultation bilaterally.  CARDIOVASCULAR:  Normal S1, S2.  No murmurs rubs or gallops.  No significant lower extremity edema.  GASTROINTESTINAL: Abdomen appears unremarkable.  Nontender.  No hepatomegaly.  No splenomegaly.  NEURO: cranial nerves 2-12 grossly intact.  No focal deficits.  Appears to have equal strength all 4 extremities.  MUSCULOSKELETAL:  Unremarkable digits/nails.  No cyanosis or clubbing.  SKIN:  Warm.  No rashes.Bruise at the site of prior subcutaneous injection on the abdomen  PSYCHIATRIC:  Normal judgment and insight.  Normal mood and affect.           "   Assessment/Plan      ASSESSMENT/PLAN:     *ITP.  Appears to be most consistent with ITP.  Peripheral smear shows no significant amount of schistocytes and no platelet clumping.  IPF elevated at 18.4%, consistent with a destructive etiology of thrombocytopenia.  Unremarkable: Folate, B12, fibrinogen, PTT.  Did not respond to platelet transfusion, also consistent with a destructive process.    Her only new medicine is Tigan, started mid April 2018 for nausea from gastroparesis.  Upon my review on Micromedex, blood dyscrasias have been reported, but no mention of immune mediated thrombocytopenia.  I doubt this is the cause of the platelet drop.  PLT unchanged this morning, but this is not surprising as this is only day 2 of prednisone and IVIG.     *Mild elevation in INR at 1.26.  Suspect this is due to cirrhosis.     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     *B12 deficiency.  On oral B12 since December 2013.     *History of esophageal varices banding.     *Iron deficiency anemia due to GI bleeding.  Has been on oral iron twice per day.     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  This is of unknown significance.     *Rheumatoid arthritis.  Reports she hasn't been unable to take Enbrel since December due to persistent fevers.     *Lupus     *Diabetes.  Hyperglycemia from steroids may be a problem.  Defer to hospitalist.     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     *Fever of up to 101 for a couple months.  She was trying to have a 48 hour time period with no fever associated started back on Enbrel.  Dr. Huertas infectious diseases been consulted.     Plan  · Continue prednisone 75 mg daily  · IVIG 400 mg/kg daily day 1 of 5 days  · Defer expected hyperglycemia from steroids to hospitalist/endocrinology services.  · Check iron labs  "to see if she would benefit from IV iron for anemia      Electronically signed by Gerson Olson II, MD at 2018  8:47 AM     Wesly Walsh MD at 2018 11:17 AM           LOS: 1 day     Name: Silvia Zabala  Age: 57 y.o.  Sex: female  :  1960  MRN: [id number]         Primary Care Physician: Blanca Pitts MD    Subjective   Subjective  Epistaxis and hematemesis have resolved.  Denies any black bowel movements.  Having a little bit of abdominal pain but states this is not bad and more of a cramping.    Objective   Vital Signs  Temp:  [98.1 °F (36.7 °C)-100.4 °F (38 °C)] 98.3 °F (36.8 °C)  Heart Rate:  [] 98  Resp:  [14-20] 20  BP: (114-161)/(68-95) 143/84  Body mass index is 26.97 kg/m².    Objective:  General Appearance:  Comfortable and in no acute distress.    Vital signs: (most recent): Blood pressure 143/84, pulse 98, temperature 98.3 °F (36.8 °C), temperature source Oral, resp. rate 20, height 167.6 cm (66\"), weight 75.8 kg (167 lb 1.6 oz), SpO2 98 %.    Lungs:  Normal effort and normal respiratory rate.    Heart: Normal rate.  Regular rhythm.    Abdomen: Abdomen is soft.  Bowel sounds are normal.   There is no abdominal tenderness.     Extremities: There is no dependent edema or local swelling.    Neurological: Patient is alert and oriented to person, place and time.    Skin:  Warm and dry.          Assessment/Plan   Principal Problem:    Thrombocytopenia  Active Problems:    Cirrhosis of liver    Rheumatoid arthritis    Anxiety and depression    Type 2 diabetes mellitus, uncontrolled, with neuropathy    Secondary esophageal varices without bleeding    Secondary esophageal varices with bleeding  Hematemesis  Recurrent fevers    Assessment & Plan    - She has been started on prednisone for her persistent thrombocytopenia.  No active bleeding at present.  Hemoglobin appears stable.  She will also be started on IVIG per hematology.  - She is on U500 insulin at home.  Expected " hyperglycemia due to initiation of steroids.  Will ask her endocrinologist to evaluate.  - Infectious disease to see for her intermittent fevers.  - Continue Protonix.  Her hematemesis likely was due to swallowing blood during her epistaxis.  These have since resolved.  She does have a history of esophageal varices however.  GI has been consulted.  - Remains off of Enbrel for her rheumatoid arthritis until she becomes fever free      Wesly Walsh MD  [facility]  05/17/18  11:17 AM      Electronically signed by Wesly Walsh MD at 5/17/2018 11:23 AM          Consult Notes (all)      Rich Coleman MD at 5/17/2018  6:51 PM               [facility]              Initial Inpatient Consult Note  Referring Provider: Nico  Reason for Consultation: Cirrhosis    Subjective     History of present illness:  This is a patient with end-stage liver disease secondary to nonalcoholic steatohepatitis.  Her course is been complicated by history of GI bleeding.  She is also had problems with gastroparesis and mild encephalopathy.  She has had problems with chronic abdominal pain and is undergone evaluation for that over the course of the last 6 months.  Interestingly, she had undergone endoscopy in November and at that time antral biopsies were obtained and were negative for any evidence of Helicobacter.  In any case, she presented to the emergency department with intractable nosebleeds and she has had some hematemesis, not severe which she believes was due to ingested blood from the nose bleeds.  She is not had any melena and she has not had any stomach pain above and beyond her usual abdominal discomfort.    Review of Systems  Pertinent items are noted in HPI, all other systems reviewed and negative    Objective     Vital Signs  Temp:  [98 °F (36.7 °C)-100.4 °F (38 °C)] 98 °F (36.7 °C)  Heart Rate:  [] 113  Resp:  [14-20] 18  BP:  (114-161)/(68-95) 131/90    Physical Exam:     General Appearance:    Alert, cooperative, in no acute distress   Head:    Normocephalic, without obvious abnormality, atraumatic   Eyes:            Lids and lashes normal, conjunctivae and sclerae normal, no   icterus, no pallor, corneas clear, PERRLA   Ears:    Ears appear intact with no abnormalities noted   Throat:   No oral lesions, no thrush, oral mucosa moist   Neck:   No adenopathy, supple, trachea midline, no thyromegaly, no     carotid bruit, no JVD   Back:     No kyphosis present, no scoliosis present, no skin lesions,       erythema or scars, no tenderness to percussion or                   palpation,   range of motion normal   Lungs:     Clear to auscultation,respirations regular, even and                   unlabored    Heart:    Regular rhythm and normal rate, normal S1 and S2, no            murmur, no gallop, no rub, no click   Breast Exam:    Deferred   Abdomen:     Not distended.  Tender in the upper abdomen which prevents deep palpation of liver and spleen.  These are typical findings for the patient, they do not appear to be acute in nature.     Genitalia:    Deferred   Extremities:   Moves all extremities well, no edema, no cyanosis, no              redness   Pulses:   Pulses palpable and equal bilaterally   Skin:   Scattered ecchymoses.  No crepitus.     Lymph nodes:   No palpable adenopathy   Neurologic:   Cranial nerves 2 - 12 grossly intact, sensation intact, DTR        present and equal bilaterally   Assessment/Plan     Principal Problem:    Thrombocytopenia  Active Problems:    Cirrhosis of liver    Rheumatoid arthritis    Anxiety and depression    Type 2 diabetes mellitus, uncontrolled, with neuropathy    DUKES (nonalcoholic steatohepatitis)    Hematemesis    Systemic lupus    Secondary esophageal varices without bleeding    Fever      I would think patient's most likely diagnosis is ITP/SLE.  There has been an association between ITP and  Helicobacter but previous biopsies have not shown any evidence of this particular infection.  Her stomach symptoms are long-standing.  Think this is much to do with her metabolic status which is been characterized by very poor diabetic control and chronic extremely high blood sugars.  SHe does not appear to have significant GI tract bleeding.  Her ITP is being aggressively managed by high-dose steroids and IVIG.  Should she develop anything suggestive of variceal bleeding I would immediately start her on octreotide bolus and infusion.  However, I would not perform any endoscopic intervention at this time due to her severe thrombocytopenia.    I discussed the patients findings and my recommendations with patient and family    Rich Coleman MD  [facility], [place], operated by [facility]/[place]  05/17/18  6:51 PM                  Electronically signed by Rich Coleman MD at 5/17/2018  6:58 PM     Merary Burnett MD at 5/17/2018  4:07 PM      Consult Orders:    1. Inpatient Endocrinology Consult [[id number]] ordered by Wesly Walsh MD at 05/17/18 1114                57 y.o.  Patient Care Team:  Blanca Pitts MD as PCP - General (Internal Medicine)  Sukhdeep Mcdowell MD as Consulting Physician (Hematology and Oncology)  Blanca Pitts MD as Referring Physician (Internal Medicine)      Chief Complaint   Patient presents with   • Dizziness   • Nose Bleed   Reason for consultation-elevated blood sugars.    HPI    57-year-old white female with a complicated past medical history presents with 3 days of severe for nosebleeds followed with 1 episode of hematemesis.  Patient is currently being followed by hematology oncology.  Endocrinology has been consulted due to the elevated blood sugars.  She is a patient of mine for the management of her diabetes.    Type 2 diabetes mellitus  Diagnosed in her 20s.  Patient has been on insulin since then.  In April 2018 patient was started on U-500 insulin due to her significantly  elevated blood sugars.  At home currently patient is on U-500 insulin 40 units with each meal and 22 units with snack, high dose sliding scale with each meal and at bedtime.  She has been on this regimen for the last 2-3 days before her presentation to the hospital.  And she does admit that on this increased insulin regimen her blood sugars have been running in 100-200 mg/dL range.  Highest blood sugar since her last visit was around 300 mg/dL range.  Lowest blood sugar was around 89 mg/dL range.  Last eye examination was in January 2018 and no history of diabetic retinopathy.  Does have history of diabetic neuropathy and is on Lyrica 75 mg twice daily.  Patient's diabetic diet intake is extremely limited due to the history of gastroparesis.  Patient is also on high-calorie diet intake due to the history of liver cirrhosis.      Pancreatic divisum : No hx of pancreatitis per pt.       Liver disease/DUKES - She is required to eat 80 gm of protein per day and she drinks two Glucerna shakes at bed time. Sees Dr. Jordan from [facility].       Gastroparesis - Sees Dr. Coleman.        Hyperlipidemia - on pravastatin 40 mg po daily.      Review of Systems   Constitutional: Positive for appetite change and fatigue. Negative for fever.   Respiratory: Negative for shortness of breath.    Cardiovascular: Negative for chest pain.   Gastrointestinal: Negative for diarrhea and vomiting.   Endocrine: Negative for polydipsia and polyuria.   Genitourinary: Negative for flank pain.   Musculoskeletal: Positive for arthralgias and myalgias.   Skin: Negative for pallor and wound.   Neurological: Positive for weakness and numbness.   Psychiatric/Behavioral: Negative for agitation.     Objective     Vital Signs  Temp:  [98 °F (36.7 °C)-100.4 °F (38 °C)] 98 °F (36.7 °C)  Heart Rate:  [] 112  Resp:  [14-20] 18  BP: (114-161)/(68-95) 135/83    Physical Exam  Physical Exam   Constitutional: She is oriented to person, place, and time. She appears  well-nourished.   Obese     HENT:   Head: Normocephalic and atraumatic.   Wide neck   Eyes: Conjunctivae and EOM are normal. No scleral icterus.   Neck: Normal range of motion. Neck supple. No thyromegaly present.   Acanthosis nigricans   Cardiovascular: Normal rate and normal heart sounds.    Pulmonary/Chest: Effort normal and breath sounds normal. No stridor. She has no wheezes.   Abdominal: Soft. Bowel sounds are normal. She exhibits no distension. There is no tenderness.   Central obesity   Musculoskeletal: She exhibits no edema or tenderness.   Neurological: She is alert and oriented to person, place, and time.   Skin: Skin is warm and dry. She is not diaphoretic.   Psychiatric: She has a normal mood and affect.   Vitals reviewed.    Assessment/Plan     Active Hospital Problems (** Indicates Principal Problem)    Diagnosis Date Noted   • **Thrombocytopenia [D69.6] 05/16/2018   • Fever [R50.9] 05/17/2018   • Secondary esophageal varices without bleeding [I85.10] 02/22/2017   • Systemic lupus [M32.9] 02/22/2017   • Hematemesis [K92.0] 02/15/2017   • DUKES (nonalcoholic steatohepatitis) [K75.81] 06/14/2016   • Type 2 diabetes mellitus, uncontrolled, with neuropathy [E11.40, E11.65] 03/15/2016   • Cirrhosis of liver [K74.60] 03/08/2016   • Rheumatoid arthritis [M06.9] 03/08/2016   • Anxiety and depression [F41.8] 03/08/2016      Resolved Hospital Problems    Diagnosis Date Noted Date Resolved   • Secondary esophageal varices with bleeding [I85.11] 03/07/2017 05/17/2018     Type 2 diabetes mellitus-severely uncontrolled  Patient's blood sugars are very uncontrolled to begin with and now on steroids it would be extremely challenging to get the blood sugars under control without the risk of hypoglycemia  Given patient's multiple comorbidities will try to aim her blood sugars between 100-200 mg/dL range.  Will start patient on levemir 50 units in the morning  Will give the first dose of levemir now.  Will start the  "patient on levemir 30 units at bedtime, will hold if that sugar is less than 100 mg/dL range.  Will start NovoLog 18 units with each meal  Will cover with high dose NovoLog sliding scale 3 times a day before meals and at bedtime.    Will give levemir injections even if the injections are close together due to the high blood sugars that the patient is having.    Hyperlipidemia  Continue pravastatin 40 mg oral daily.    ITP  Patient is currently on steroids and is being managed by hematology oncology  Will adjust insulin regimen based on the steroid dosing.      Merary Burnett MD.  05/17/18  4:07 PM        EMR Dragon / transcription disclaimer:    \"Dictated utilizing Dragon dictation\".     Electronically signed by Merary Burnett MD at 5/17/2018  4:15 PM     Shankar Huertas MD at 5/17/2018 11:31 AM      Consult Orders:    1. Inpatient Infectious Diseases Consult [[id number]] ordered by Jose Almanza MD at 05/16/18 7064                Referring Provider: Jose Almanza MD  Reason for Consultation: FUO    Subjective   History of present illness:    This a very nice 57-year-old with chronic comorbidities including lupus, rheumatoid arthritis off any immunomodulatory therapy, diabetes mellitus type 2-cirrhosis who was admitted on 5/16/18 with thrombocytopenia.  ID is asked to comment on FUO.    Per the patient, she was in her usual state of health until December 2017 when she was admitted for DKA.  Her gastroparesis had flared and it was thought to be medication related.  She is monitored off multiple different medications including her Enbrel.  She then started developing fevers.  She said they occur daily or every other day last several hours are associated with shaking chills and are without exacerbating or ameliorating factors.  She has had some rash and some joint stiffness and increasing nausea and vomiting as well as chronic substernal chest pain nausea.  Denies receipt of antibiotics other than " "for urinary tract infection.  She is not around animals has not traveled internationally and has not had tick bites.  Denies other esoteric functions.  Discussed this case with Dr. code of hematology and he is concerned that the patient has acute platelet drop consistent with ITP.    Past medical history Wong cirrhosis, hypersplenism, depression/anxiety, diabetes mellitus type 2, fibromyalgia, gastroparesis, esophageal varices with bleed, rheumatoid arthritis, lupus, seizures, bladder repair, cholecystectomy, cataract extraction, multiple EGD, hysterectomy, right knee surgery with prosthesis      No family history of infectious diseases other than her sisters had some lung infections  Social history: She lives in [place], [place] with her  and his parents.  Denies tobacco ethanol or drug use.  No recent international or domestic travel.  No animal contact    Allergies   Allergen Reactions   • Albuterol Anaphylaxis   • Lactulose Nausea And Vomiting and Other (See Comments)     Severe abdominal pain   • Tramadol Other (See Comments)     Per pt causes her \"palsy, meaning shaking and tremors\" and gi upset, nausea   • Quinine Derivatives Nausea And Vomiting         Review of Systems  Pertinent items are noted in HPI, all other systems reviewed and negative    Objective     Physical Exam:   Vital Signs   Temp:  [98.1 °F (36.7 °C)-100.4 °F (38 °C)] 98.3 °F (36.8 °C)  Heart Rate:  [] 98  Resp:  [14-20] 20  BP: (114-161)/(68-95) 143/84    GENERAL: Awake and alert, in no acute distress.   HEENT: Oropharynx is clear. Hearing is grossly normal.   EYES: PERRL. No conjunctival injection. No lid lag.   LYMPHATICS: No lymphadenopathy of the neck or inguinal regions.   HEART: Regular rate and rhythm. No peripheral edema.   LUNGS: Clear to auscultation anteriorly with normal respiratory effort.   GI: Soft, nontender, nondistended.+HSM   SKIN: Warm and dry without cutaneous eruptions   PSYCHIATRIC: Appropriate mood, " affect, insight, and judgment.     Results Review:   I reviewed the patient's new clinical results.  WBC 3.1   PLT 4  H/H 10.7/33    CRP 0.42  ESR 44        Microbiology:  bcx ngtd    Radiology: none      Assessment/Plan   1.  FUO for past 6 months  2.  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  Poorly controlled diabetes mellitus type 2, comp getting above    I think it is very likely given the chronicity of her fevers that these are not related to infection.  May be related to the hematologic process or untreated autoimmune disease.  She had recent CT of her abdomen and pelvis in March 2018 which was negative.  About the only place that we haven't looked is a CT of her chest and I will go ahead and order that for completeness sake.  If this remains negative, I would proceed with therapy targeting her rheumatoid arthritis and ITP and see if that has any influence on her fevers.     Thank you for this consult.  We will continue to follow along and tailor antibiotics as the patient's clinical course evolves.    Shankar Huertas MD  05/17/18  11:31 AM          Electronically signed by Shankar Huertas MD at 5/17/2018 11:38 AM     Gerson Olson II, MD at 5/17/2018  9:54 AM      Consult Orders:    1. Hematology & Oncology Inpatient Consult [[id number]] ordered by Jose Almanza MD at 05/16/18 4817     2. Hematology and Oncology (on-call MD unless specified) [[id number]] ordered by Mauro Moore MD at 05/16/18 225     3. Hematology and Oncology (on-call MD unless specified) [[id number]] ordered by Mauro Moore MD at 05/16/18 2157                  Subjective  .     REASON FOR CONSULTATION:   Low platelets  Provide an opinion on any further workup or treatment                             REQUESTING PHYSICIAN: Jose Almanza MD  RECORDS OBTAINED:  Records of the patients history including those from the electronic medical record were reviewed and  "summarized in detail.    HISTORY OF PRESENT ILLNESS:  The patient is a 57 y.o. year old female  who is here for follow-up with the above-mentioned history.    3 days of severe nosebleeds followed by one day of hematemesis prompted an ER visit on 5/16/18.  Patient thought perhaps she was swallowing blood from it..  She's had no more hematemesis since coming to the hospital.  In the ER, her platelets were around 6.  Did not respond to platelet transfusion.    Since December, has had extremely high blood sugars, sometimes up to 1000, fevers up to 101, severe nausea and vomiting, and drenching night sweats.  Her only new medicine is Tigan since mid April 2018 for nausea from gastroparesis.  REVIEW OF SYSTEMS:  Review of Systems   Constitutional: Positive for diaphoresis and fever. Negative for activity change.   HENT: Positive for nosebleeds. Negative for trouble swallowing.    Respiratory: Negative for shortness of breath and wheezing.    Cardiovascular: Negative for chest pain and palpitations.   Gastrointestinal: Positive for nausea and vomiting. Negative for blood in stool, constipation and diarrhea.   Genitourinary: Negative for dysuria and hematuria.   Musculoskeletal: Negative for arthralgias and myalgias.   Skin: Negative for rash and wound.   Neurological: Negative for seizures and syncope.   Hematological: Negative for adenopathy. Does not bruise/bleed easily.   Psychiatric/Behavioral: Negative for confusion.         Objective     Vitals:    05/17/18 0022 05/17/18 0151 05/17/18 0717 05/17/18 0906   BP: 161/95 155/85 143/84    BP Location: Right arm Left arm Right arm    Patient Position: Lying Lying Lying    Pulse: 106   98   Resp: 16  20    Temp: 99.7 °F (37.6 °C) 98.5 °F (36.9 °C) 98.3 °F (36.8 °C)    TempSrc: Oral Oral Oral    SpO2:  99% 98%    Weight: 75.8 kg (167 lb 1.6 oz)      Height: 167.6 cm (66\")        Current Status 10/12/2017   ECOG score 1      PHYSICAL EXAM:    CONSTITUTIONAL:  Vital signs " reviewed.  No distress, looks comfortable.  EYES:  Conjunctiva and lids unremarkable.  PERRLA  EARS,NOSE,MOUTH,THROAT:  Ears and nose appear unremarkable.  Lips, teeth, gums appear unremarkable.  No mucosal bleeding.  No current nose bleeding.  RESPIRATORY:  Normal respiratory effort.  Lungs clear to auscultation bilaterally.  CARDIOVASCULAR:  Normal S1, S2.  No murmurs rubs or gallops.  No significant lower extremity edema.  GASTROINTESTINAL: Abdomen appears unremarkable.  Nontender.  No hepatomegaly.  No splenomegaly.  LYMPHATIC:  No cervical, supraclavicular, axillary lymphadenopathy.  NEURO: cranial nerves 2-12 grossly intact.  No focal deficits.  Appears to have equal strength all 4 extremities.  MUSCULOSKELETAL:  Unremarkable digits/nails.  No cyanosis or clubbing.  No apparent joint deformities.  SKIN:  Warm.  No rashes.  PSYCHIATRIC:  Normal judgment and insight.  Normal mood and affect.   Assessment/Plan       *ITP.  Appears to be most consistent with ITP.  Peripheral smear shows no significant amount of schistocytes and no platelet clumping.  IPF elevated at 18.4%, consistent with a destructive etiology of thrombocytopenia.  Unremarkable: Folate, B12, fibrinogen, PTT.  Did not respond to platelet transfusion, also consistent with a destructive process.    Her only new medicine is Tigan, started mid April 2018 for nausea from gastroparesis.  Upon my review on Micromedex, blood dyscrasias have been reported, but no mention of immune mediated thrombocytopenia.  I doubt this is the cause of the platelet drop.    *Mild elevation in INR at 1.26.  Suspect this is due to cirrhosis.    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    *B12 deficiency.  On oral B12 since December 2013.    *History of esophageal varices banding.    *Iron deficiency anemia due to GI bleeding.  Has been on oral iron twice per day.    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  This is of unknown significance.    *Rheumatoid arthritis.  Reports she hasn't been unable to take Enbrel since December due to persistent fevers.    *Lupus    *Diabetes.  Hyperglycemia from steroids may be a problem.  Defer to hospitalist.    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    *Fever of up to 101 for a couple months.  She was trying to have a 48 hour time period with no fever associated started back on Enbrel.  Dr. Huertas infectious diseases been consulted.    Plan  · Continue steroids.  I did increase them to 1 mg/kg dosing.  · IVIG 400 mg/kg daily day 1 of 5 days  · Defer expected hyperglycemia from steroids to hospitalist service.    Case discussed with Dr. Rajput who received a call last night.  Case also discussed with Dr. Huertas infectious disease.      Electronically signed by Gerson Olson II, MD at 5/17/2018 10:32 AM       All medication doses during the admission are shown, including meds that are no longer on order.   Scheduled Meds Sorted by Name   for Silvia Zabala as of 5/16/18 through 5/18/18     1 Day 3 Days 7 Days 10 Days < Today >    Legend:                          Inactive     Active     Other Encounter    Linked               Medications 05/16/18 05/17/18 05/18/18   acetaminophen (TYLENOL) tablet 1,000 mg  Dose: 1,000 mg  Freq: Once Route: PO  Start: 05/16/18 2253 End: 05/16/18 2300    Admin Instructions:   Do not exceed 4 grams of acetaminophen in a 24 hr period.    If given for pain, use the following pain scale:   Mild Pain = Pain Score of 1-3, CPOT 1-2  Moderate Pain = Pain Score of 4-6, CPOT 3-4  Severe Pain = Pain Score of 7-10, CPOT 5-8    225   2300            acetaminophen (TYLENOL) tablet 650 mg  Dose: 650 mg  Freq: Daily Route: PO  Start: 05/17/18 1100 End: 05/22/18 0859    Admin Instructions:   PREMED FOR IVIG  Do not exceed 4 grams of acetaminophen in a 24  hr period.    If given for pain, use the following pain scale:   Mild Pain = Pain Score of 1-3, CPOT 1-2  Moderate Pain = Pain Score of 4-6, CPOT 3-4  Severe Pain = Pain Score of 7-10, CPOT 5-8     1400          0943            buPROPion XL (WELLBUTRIN XL) 24 hr tablet 150 mg  Dose: 150 mg  Freq: Daily Route: PO  Start: 05/17/18 0900    Admin Instructions:   Swallow whole. Do not crush, chew, or split tablet.     0907 0942            clotrimazole (MYCELEX) omer 10 mg  Dose: 10 mg  Freq: 5 Times Daily Route: PO  Start: 05/17/18 0700    Admin Instructions:   Dissolve in mouth.     0906   1140   1401     1716   2242        0730   1100   1400     1800   2200          cycloSPORINE (RESTASIS) 0.05 % ophthalmic emulsion 1 drop  Dose: 1 drop  Freq: 2 Times Daily Route: Both Eyes  Start: 05/17/18 0900    Admin Instructions:   Prior to use invert container several times to obtain uniform emulsion     0907   2242        0943   2100          diphenhydrAMINE (BENADRYL) capsule 25 mg  Dose: 25 mg  Freq: Daily Route: PO  Start: 05/17/18 1100 End: 05/22/18 0859    Admin Instructions:   PREMED FOR IVIG  This med may be ordered in other forms and routes. Before giving verify the last time the drug was given by any route/form.     1400          0942            diphenhydrAMINE (BENADRYL) capsule 50 mg  Dose: 50 mg  Freq: Once Route: PO  Start: 05/16/18 2258 End: 05/16/18 2308    Admin Instructions:   This med may be ordered in other forms and routes. Before giving verify the last time the drug was given by any route/form.    2308              DULoxetine (CYMBALTA) DR capsule 90 mg  Dose: 90 mg  Freq: Daily Route: PO  Start: 05/17/18 0900    Admin Instructions:   Caution: Look alike/sound alike drug alert Do not crush or chew capsule.     0907 0942            ferrous sulfate tablet 325 mg  Dose: 325 mg  Freq: 2 Times Daily Route: PO  Start: 05/17/18 0900    Admin Instructions:   Swallow whole. Do not crush, split, or  chew. Take with food if GI upset occurs.     0906 2241 0942 2100          folic acid (FOLVITE) tablet 1 mg  Dose: 1 mg  Freq: Daily Route: PO  Start: 05/17/18 0900 0906 0942            immune globulin (human) (GAMMAGARD) infusion 30 g  Dose: 400 mg/kg  Weight Dosing Info: 75.8 kg  Freq: Daily Route: IV  Start: 05/17/18 1100     1517          0900            insulin aspart (novoLOG) injection 0-14 Units  Dose: 0-14 Units  Freq: 4 Times Daily With Meals & Nightly Route: SC  Start: 05/17/18 0800    Admin Instructions:   Correction - Moderate-High Dose.  60-80 units/day total insulin dose or uses insulin at home    Blood glucose 150-199 mg/dL - 3 units  Blood glucose 200-249 mg/dL - 5 units  Blood glucose 250-299 mg/dL - 8 units  Blood glucose 300-349 mg/dL - 10 units  Blood glucose 350-400 mg/dL - 12 units  Blood glucose greater than 400 mg/dL - 14 units and call provider         0914   1132   1712     5567          0995   1200   1800     2100            insulin aspart (novoLOG) injection 0-7 Units  Dose: 0-7 Units  Freq: 4 Times Daily With Meals & Nightly Route: SC  Start: 05/17/18 0800 End: 05/17/18 0301    Admin Instructions:   Correction - Low Dose.  Less than 40 units/day total insulin dose or lean, elderly, renal patients    Blood glucose 150-199 mg/dL - 2 units  Blood glucose 200-249 mg/dL - 3 units  Blood glucose 250-299 mg/dL - 4 units  Blood glucose 300-349 mg/dL - 5 units  Blood glucose 350-400 mg/dL - 6 units  Blood glucose greater than 400 mg/dL - 7 units and call provider         0301-D/C'd           insulin aspart (novoLOG) injection 18 Units  Dose: 18 Units  Freq: 3 Times Daily With Meals Route: SC  Start: 05/17/18 1800    Admin Instructions:        1710          0943   1200   1800        insulin detemir (LEVEMIR) injection 30 Units  Dose: 30 Units  Freq: Nightly Route: SC  Start: 05/17/18 2100    Admin Instructions:   Hold if BG is less than 100 mg/dl.     2248 2100             insulin detemir (LEVEMIR) injection 50 Units  Dose: 50 Units  Freq: Every Morning Route: SC  Start: 05/17/18 1700     1811          0732            levETIRAcetam (KEPPRA) tablet 500 mg  Dose: 500 mg  Freq: 2 Times Daily Route: PO  Start: 05/17/18 0900    Admin Instructions:   Swallow whole; do not crush, chew, or split tablet.     0906   2240        0941   2100          ondansetron (ZOFRAN) injection 4 mg  Dose: 4 mg  Freq: Once Route: IV  Start: 05/16/18 2108 End: 05/16/18 2117 2117              pantoprazole (PROTONIX) EC tablet 40 mg  Dose: 40 mg  Freq: Every Early Morning Route: PO  Start: 05/18/18 1000    Admin Instructions:   Swallow whole; do not crush, split, or chew.      (0944) [C]            pantoprazole (PROTONIX) EC tablet 40 mg  Dose: 40 mg  Freq: Every Early Morning Route: PO  Start: 05/17/18 0600 End: 05/17/18 1118    Admin Instructions:   Swallow whole; do not crush, split, or chew.     (0813) [C]   1118-D/C'd         pantoprazole (PROTONIX) injection 40 mg  Dose: 40 mg  Freq: Every 12 Hours Scheduled Route: IV  Indications of Use: Gastrointestinal (GI) Bleed  Start: 05/16/18 2327 End: 05/18/18 0920    Admin Instructions:   Dilute with 10 mL of 0.9% NaCl and give IV push over 2 minutes.     0505   (0908) [C]   2242      0920-D/C'd  0941            predniSONE (DELTASONE) tablet 15 mg  Dose: 15 mg  Freq: Once Route: PO  Start: 05/17/18 1100 End: 05/17/18 1711    Admin Instructions:   Take with food.     1711 [C]             predniSONE (DELTASONE) tablet 60 mg  Dose: 60 mg  Freq: Daily With Breakfast Route: PO  Start: 05/17/18 0300 End: 05/17/18 1118    Admin Instructions:   Take with food.     0506   1118-D/C'd         predniSONE (DELTASONE) tablet 75 mg  Dose: 75 mg  Freq: Daily With Breakfast Route: PO  Start: 05/18/18 0800    Admin Instructions:   Take with food.      0941            pregabalin (LYRICA) capsule 75 mg  Dose: 75 mg  Freq: 2 Times Daily Route: PO  Start: 05/17/18 0900      0907 2243 0942 2100          rifaximin (XIFAXAN) tablet 550 mg  Dose: 550 mg  Freq: 2 Times Daily Route: PO  Start: 05/17/18 0900     0906   2241        0941   2100          spironolactone (ALDACTONE) tablet 100 mg  Dose: 100 mg  Freq: Daily Route: PO  Start: 05/17/18 0900    Admin Instructions:   Hold sbp <110     0907 0942            sucralfate (CARAFATE) tablet 1 g  Dose: 1 g  Freq: 2 Times Daily Route: PO  Start: 05/17/18 0900    Admin Instructions:   Take on an empty stomach.     0906 2241 0941 2100          trimethobenzamide (TIGAN) capsule 300 mg  Dose: 300 mg  Freq: 3 Times Daily Route: PO  Start: 05/17/18 0900     0906   1712   2241 0942   1600 2100        Medications 05/16/18 05/17/18 05/18/18       Continuous Meds Sorted by Name   for Silvia Zabala as of 5/16/18 through 5/18/18    Legend:                          Inactive     Active     Other Encounter    Linked               Medications 05/16/18 05/17/18 05/18/18       PRN Meds Sorted by Name   for Silvia Zabala as of 5/16/18 through 5/18/18    Legend:                          Inactive     Active     Other Encounter    Linked               Medications 05/16/18 05/17/18 05/18/18   acetaminophen (TYLENOL) tablet 650 mg  Dose: 650 mg  Freq: Every 4 Hours PRN Route: PO  PRN Reason: Mild Pain   Start: 05/16/18 2326    Admin Instructions:   Do not exceed 4 grams of acetaminophen in a 24 hr period.    If given for pain, use the following pain scale:   Mild Pain = Pain Score of 1-3, CPOT 1-2  Moderate Pain = Pain Score of 4-6, CPOT 3-4  Severe Pain = Pain Score of 7-10, CPOT 5-8         ALPRAZolam (XANAX) tablet 0.5 mg  Dose: 0.5 mg  Freq: 2 Times Daily PRN Route: PO  PRN Reason: Anxiety  Start: 05/17/18 0223 End: 05/17/18 0920    Admin Instructions:   Caution: Look alike/sound alike drug alert. Avoid grapefruit juice     0530   0920-D/C'd         ALPRAZolam (XANAX) tablet 0.5 mg  Dose: 0.5 mg  Freq: 2 Times Daily PRN  Route: PO  PRN Reason: Anxiety  Start: 05/17/18 0104 End: 05/27/18 0103    Admin Instructions:   Caution: Look alike/sound alike drug alert. Avoid grapefruit juice     2249             ALPRAZolam (XANAX) tablet 0.5 mg  Dose: 0.5 mg  Freq: 2 Times Daily PRN Route: PO  PRN Reason: Anxiety  Start: 05/17/18 0053 End: 05/17/18 0103    Admin Instructions:   Caution: Look alike/sound alike drug alert. Avoid grapefruit juice     0103-D/C'd           cyclobenzaprine (FLEXERIL) tablet 5 mg  Dose: 5 mg  Freq: 3 Times Daily PRN Route: PO  PRN Reason: Muscle Spasms  Start: 05/17/18 0106     0150             cyclobenzaprine (FLEXERIL) tablet 5 mg  Dose: 5 mg  Freq: 3 Times Daily PRN Route: PO  PRN Reason: Muscle Spasms  Start: 05/17/18 0054 End: 05/17/18 0103 0103-D/C'd           dextrose (D50W) solution 25 g  Dose: 25 g  Freq: Every 15 Minutes PRN Route: IV  PRN Reason: Low Blood Sugar  PRN Comment: Blood Sugar Less Than 70  Start: 05/17/18 0301 End: 05/17/18 0920    Admin Instructions:   Blood sugar less than 70; patient has IV access - Unresponsive, NPO or Unable To Safely Swallow     0920-D/C'd           dextrose (D50W) solution 25 g  Dose: 25 g  Freq: Every 15 Minutes PRN Route: IV  PRN Reason: Low Blood Sugar  PRN Comment: Blood Sugar Less Than 70  Start: 05/16/18 2329    Admin Instructions:   Blood sugar less than 70; patient has IV access - Unresponsive, NPO or Unable To Safely Swallow         dextrose (GLUTOSE) oral gel 15 g  Dose: 15 g  Freq: Every 15 Minutes PRN Route: PO  PRN Reason: Low Blood Sugar  PRN Comment: Blood sugar less than 70  Start: 05/17/18 0301 End: 05/17/18 0920    Admin Instructions:   BS<70, Patient Alert, Is not NPO, Can safely swallow.     0920-D/C'd           dextrose (GLUTOSE) oral gel 15 g  Dose: 15 g  Freq: Every 15 Minutes PRN Route: PO  PRN Reason: Low Blood Sugar  PRN Comment: Blood sugar less than 70  Start: 05/16/18 2444    Admin Instructions:   BS<70, Patient Alert, Is not NPO, Can  safely swallow.         glucagon (human recombinant) (GLUCAGEN DIAGNOSTIC) injection 1 mg  Dose: 1 mg  Freq: As Needed Route: SC  PRN Comment: Blood Glucose Less Than 70  Start: 05/17/18 0301 End: 05/17/18 0920    Admin Instructions:   Blood Glucose Less Than 70 - Patient Without IV Access - Unresponsive, NPO or Unable To Safely Swallow     0920-D/C'd           glucagon (human recombinant) (GLUCAGEN DIAGNOSTIC) injection 1 mg  Dose: 1 mg  Freq: As Needed Route: SC  PRN Comment: Blood Glucose Less Than 70  Start: 05/16/18 2329    Admin Instructions:   Blood Glucose Less Than 70 - Patient Without IV Access - Unresponsive, NPO or Unable To Safely Swallow         HYDROcodone-acetaminophen (NORCO) 5-325 MG per tablet 1 tablet  Dose: 1 tablet  Freq: Every 4 Hours PRN Route: PO  PRN Reason: Moderate Pain   Start: 05/16/18 2326 End: 05/17/18 1116    Admin Instructions:   Do not exceed 4 grams of acetaminophen in a 24 hr period.    If given for pain, use the following pain scale:   Mild Pain = Pain Score of 1-3, CPOT 1-2  Moderate Pain = Pain Score of 4-6, CPOT 3-4  Severe Pain = Pain Score of 7-10, CPOT 5-8     4094 (7738) 2556-D/C'd       hydrOXYzine (ATARAX) tablet 25 mg  Dose: 25 mg  Freq: 3 Times Daily PRN Route: PO  PRN Reason: Itching  Start: 05/17/18 0224 End: 05/17/18 0234    Admin Instructions:   Caution: Look alike/sound alike drug alert     0234-D/C'd           hydrOXYzine (ATARAX) tablet 25 mg  Dose: 25 mg  Freq: 3 Times Daily PRN Route: PO  PRN Reason: Itching  Start: 05/17/18 0106    Admin Instructions:   Caution: Look alike/sound alike drug alert         hydrOXYzine (ATARAX) tablet 25 mg  Dose: 25 mg  Freq: 3 Times Daily PRN Route: PO  PRN Reason: Itching  Start: 05/17/18 0055 End: 05/17/18 0103    Admin Instructions:   Caution: Look alike/sound alike drug alert     0103-D/C'd           oxyCODONE (ROXICODONE) immediate release tablet 5 mg  Dose: 5 mg  Freq: Every 6 Hours PRN Route: PO  PRN Reason:  Moderate Pain   Start: 05/17/18 0225    Admin Instructions:   If given for pain, use the following pain scale:  Mild Pain = Pain Score of 1-3, CPOT 1-2  Moderate Pain = Pain Score of 4-6, CPOT 3-4  Severe Pain = Pain Score of 7-10, CPOT 5-8     1105   1958        0942            promethazine (PHENERGAN) tablet 25 mg  Dose: 25 mg  Freq: Every 6 Hours PRN Route: PO  PRN Reasons: Nausea,Vomiting  Start: 05/17/18 0225    Admin Instructions:        1105   1958           sodium chloride 0.9 % flush 1-10 mL  Dose: 1-10 mL  Freq: As Needed Route: IV  PRN Reason: Line Care  Start: 05/16/18 2326         sodium chloride 0.9 % flush 10 mL  Dose: 10 mL  Freq: As Needed Route: IV  PRN Reason: Line Care  Start: 05/16/18 1919         Medications 05/16/18 05/17/18 05/18/18

## 2018-05-18 NOTE — PROGRESS NOTES
57 y.o.   LOS: 2 days   Patient Care Team:  Blanca Pitts MD as PCP - General (Internal Medicine)  Sukhdeep Mcdowell MD as Consulting Physician (Hematology and Oncology)  Blanca Pitts MD as Referring Physician (Internal Medicine)    Chief Complaint:  Elevated blood sugars    Chief Complaint   Patient presents with   • Dizziness   • Nose Bleed       Subjective   Patient's blood sugars are severely uncontrolled, most of her blood sugars are running in 300s range.  She still has poor appetite associated with some nausea.    Interval History:    Review of Systems:   Review of Systems   Constitutional: Positive for appetite change and fatigue. Negative for fever.   Respiratory: Negative for shortness of breath.    Cardiovascular: Negative for chest pain.   Gastrointestinal: Negative for diarrhea and vomiting.   Endocrine: Negative for polydipsia and polyuria.   Genitourinary: Negative for flank pain.   Musculoskeletal: Positive for arthralgias and myalgias.   Skin: Positive for pallor.   Neurological: Positive for weakness. Negative for numbness.   Psychiatric/Behavioral: Negative for agitation.     Objective     Vital Signs   Temp:  [98.4 °F (36.9 °C)-98.6 °F (37 °C)] 98.4 °F (36.9 °C)  Heart Rate:  [] 98  Resp:  [16-20] 20  BP: (127-149)/(77-90) 136/80    Physical Exam:  Physical Exam   Constitutional: She is oriented to person, place, and time. She appears well-nourished.   Obese     HENT:   Head: Normocephalic and atraumatic.   Wide neck   Eyes: Conjunctivae and EOM are normal. No scleral icterus.   Neck: Normal range of motion. Neck supple. No thyromegaly present.   Acanthosis nigricans   Cardiovascular: Normal rate and normal heart sounds.    Pulmonary/Chest: Effort normal and breath sounds normal. No stridor. She has no wheezes.   Abdominal: Soft. Bowel sounds are normal. She exhibits no distension. There is no tenderness.   Central obesity   Musculoskeletal: She exhibits no edema or tenderness.    Neurological: She is alert and oriented to person, place, and time.   Skin: Skin is warm and dry. She is not diaphoretic.   Psychiatric: She has a normal mood and affect.   Vitals reviewed.  Results Review:     I reviewed the patient's new clinical results.      Glucose   Date/Time Value Ref Range Status   05/18/2018 0425 323 (H) 65 - 99 mg/dL Final   05/17/2018 0358 207 (H) 65 - 99 mg/dL Final   05/16/2018 1943 347 (H) 65 - 99 mg/dL Final     Lab Results (last 24 hours)     Procedure Component Value Units Date/Time    Cryptococcal Antigen [[id number]] Collected:  05/17/18 0049    Specimen:  Blood Updated:  05/18/18 1527    [facility] Comprehensive Panel [[id number]] Collected:  05/17/18 0049    Specimen:  Blood Updated:  05/18/18 1518     Anti-DNA (DS) Ab Qn 1 IU/mL      Comment:                                    Negative      <5                                     Equivocal  5 - 9                                     Positive      >9        RNP Antibodies 0.4 AI      Mendoza Antibodies <0.2 AI      Antiscleroderma-70 Antibodies <0.2 AI      TOBI SSA (RO) Ab <0.2 AI      TOBI SSB (LA) Ab <0.2 AI      Antichromatin Antibodies <0.2 AI      ADAN-1 IgG <0.2 AI      Anti-Centromere B Antibodies <0.2 AI      See below: Comment     Comment: Autoantibody                       Disease Association  ------------------------------------------------------------                          Condition                  Frequency  ---------------------   ------------------------   ---------  Antinuclear Antibody,    SLE, mixed connective  Direct ([facility]FI-D)           tissue diseases  ---------------------   ------------------------   ---------  dsDNA                    SLE                        40 - 60%  ---------------------   ------------------------   ---------  Chromatin                Drug induced SLE                90%                           SLE                        48 - 97%  ---------------------   ------------------------   ---------  SSA  (Ro)                 SLE                        25 - 35%                           Sjogren's Syndrome         40 - 70%                            Lupus                 100%  ---------------------   ------------------------   ---------  SSB (La)                 SLE                             10%                           Sjogren's Syndrome              30%  ---------------------   -----------------------    ---------  Sm (anti-Smith)          SLE                        15 - 30%  ---------------------   -----------------------    ---------  RNP                      Mixed Connective Tissue                           Disease                         95%  (U1 nRNP,                SLE                        30 - 50%  anti-ribonucleoprotein)  Polymyositis and/or                           Dermatomyositis                 20%  ---------------------   ------------------------   ---------  Scl-70 (antiDNA          Scleroderma (diffuse)      20 - 35%  topoisomerase)           Crest                           13%  ---------------------   ------------------------   ---------  Basia-1                     Polymyositis and/or                           Dermatomyositis            20 - 40%  ---------------------   ------------------------   ---------  Centromere B             Scleroderma - Crest                           variant                         80%       Narrative:       Performed at:  [address]  [id number]  : Quan Aguilar PhD, Phone:  [phone]    POC Glucose Once [[id number]]  (Abnormal) Collected:  18 1144    Specimen:  Blood Updated:  18 1146     Glucose 295 (H) mg/dL     Narrative:       Meter: [id number] : [id number] Gary Ghoshtell B-D Glucan [[id number]] Collected:  18 1057    Specimen:  Blood Updated:  18 1123    Hepatitis B & C Profile [[id number]] Collected:  18 1057    Specimen:  Blood Updated:   05/18/18 1122    Amylase [[id number]]  (Abnormal) Collected:  05/18/18 0425    Specimen:  Blood Updated:  05/18/18 1028     Amylase 16 (L) U/L     Lipase [[id number]]  (Abnormal) Collected:  05/18/18 0425    Specimen:  Blood Updated:  05/18/18 1028     Lipase 9 (L) U/L     POC Glucose Once [[id number]]  (Abnormal) Collected:  05/18/18 0634    Specimen:  Blood Updated:  05/18/18 0635     Glucose 334 (H) mg/dL     Narrative:       Meter: [id number] : [id number] Conner JOE    Basic Metabolic Panel [[id number]]  (Abnormal) Collected:  05/18/18 0425    Specimen:  Blood Updated:  05/18/18 0538     Glucose 323 (H) mg/dL      BUN 13 mg/dL      Creatinine 0.70 mg/dL      Sodium 136 mmol/L      Potassium 4.4 mmol/L      Chloride 100 mmol/L      CO2 23.6 mmol/L      Calcium 8.8 mg/dL      eGFR Non African Amer 87 mL/min/1.73      BUN/Creatinine Ratio 18.6     Anion Gap 12.4 mmol/L     Narrative:       GFR Normal >60  Chronic Kidney Disease <60  Kidney Failure <15    CBC & Differential [[id number]] Collected:  05/18/18 0425    Specimen:  Blood Updated:  05/18/18 0522    Narrative:       The following orders were created for panel order CBC & Differential.  Procedure                               Abnormality         Status                     ---------                               -----------         ------                     Scan Slide[[id number]]                                                                  CBC Auto Differential[[id number]]        Abnormal            Final result                 Please view results for these tests on the individual orders.    CBC Auto Differential [[id number]]  (Abnormal) Collected:  05/18/18 0425    Specimen:  Blood Updated:  05/18/18 0522     WBC 2.01 (L) 10*3/mm3      RBC 3.51 (L) 10*6/mm3      Hemoglobin 9.6 (L) g/dL      Hematocrit 29.9 (L) %      MCV 85.2 fL      MCH 27.4 pg      MCHC 32.1 (L) g/dL      RDW 15.4 (H) %      RDW-SD 48.0 fl      MPV -- fL      Comment: .         Platelets 4 (C) 10*3/mm3      Neutrophil % 63.2 %      Lymphocyte % 24.4 %      Monocyte % 11.4 %      Eosinophil % 0.5 %      Basophil % 0.5 %      Immature Grans % 1.0 (H) %      Neutrophils, Absolute 1.27 (L) 10*3/mm3      Lymphocytes, Absolute 0.49 (L) 10*3/mm3      Monocytes, Absolute 0.23 10*3/mm3      Eosinophils, Absolute 0.01 10*3/mm3      Basophils, Absolute 0.01 10*3/mm3      Immature Grans, Absolute 0.02 10*3/mm3      nRBC 0.0 /100 WBC     Blood Culture - Blood, [[id number]]  (Normal) Collected:  05/17/18 0054    Specimen:  Blood from Arm, Left Updated:  05/18/18 0100     Blood Culture No growth at 24 hours    Blood Culture - Blood, [[id number]]  (Normal) Collected:  05/17/18 0049    Specimen:  Blood from Arm, Right Updated:  05/18/18 0100     Blood Culture No growth at 24 hours    POC Glucose Once [[id number]]  (Abnormal) Collected:  05/17/18 2135    Specimen:  Blood Updated:  05/17/18 2136     Glucose 347 (H) mg/dL     Narrative:       Meter: [id number] : [id number] Conner JOE    POC Glucose Once [[id number]]  (Abnormal) Collected:  05/17/18 1651    Specimen:  Blood Updated:  05/17/18 1653     Glucose 437 (H) mg/dL     Narrative:       RN Notified R and V Meter: [id number] : [id number] Tj STEPHENS CNA        Imaging Results (last 24 hours)     ** No results found for the last 24 hours. **          Medication Review: done      Current Facility-Administered Medications:   •  acetaminophen (TYLENOL) tablet 650 mg, 650 mg, Oral, Q4H PRN, Jose Almanza MD  •  acetaminophen (TYLENOL) tablet 650 mg, 650 mg, Oral, Daily, Gerson Olson II, MD, 650 mg at 05/18/18 0943  •  ALPRAZolam (XANAX) tablet 0.5 mg, 0.5 mg, Oral, BID PRN, Jose Almanza MD, 0.5 mg at 05/17/18 6369  •  buPROPion XL (WELLBUTRIN XL) 24 hr tablet 150 mg, 150 mg, Oral, Daily, Jose Almanza MD, 150 mg at 05/18/18 0942  •  clotrimazole (MYCELEX) omer 10 mg, 10 mg, Oral, 5x Daily, Jose Almanza MD, 10 mg at 05/18/18  1435  •  cyclobenzaprine (FLEXERIL) tablet 5 mg, 5 mg, Oral, TID PRN, Jose Almanza MD, 5 mg at 05/17/18 0150  •  cycloSPORINE (RESTASIS) 0.05 % ophthalmic emulsion 1 drop, 1 drop, Both Eyes, BID, Jose Almanza MD, 1 drop at 05/18/18 0943  •  dextrose (D50W) solution 25 g, 25 g, Intravenous, Q15 Min PRN, Jose Almanza MD  •  dextrose (GLUTOSE) oral gel 15 g, 15 g, Oral, Q15 Min PRN, Jose Almanza MD  •  diphenhydrAMINE (BENADRYL) capsule 25 mg, 25 mg, Oral, Daily, Gerson Olson II, MD, 25 mg at 05/18/18 0942  •  DULoxetine (CYMBALTA) DR capsule 90 mg, 90 mg, Oral, Daily, Jose Almanza MD, 90 mg at 05/18/18 0942  •  ferrous sulfate tablet 325 mg, 325 mg, Oral, BID, Jose Almanza MD, 325 mg at 05/18/18 0942  •  folic acid (FOLVITE) tablet 1 mg, 1 mg, Oral, Daily, Jose Almanza MD, 1 mg at 05/18/18 0942  •  glucagon (human recombinant) (GLUCAGEN DIAGNOSTIC) injection 1 mg, 1 mg, Subcutaneous, PRN, Jose Almanza MD  •  hydrOXYzine (ATARAX) tablet 25 mg, 25 mg, Oral, TID PRN, Jose Almanza MD  •  immune globulin (human) (GAMMAGARD) infusion 30 g, 400 mg/kg, Intravenous, Daily, Gerson Olson II, MD, 30 g at 05/18/18 1124  •  insulin aspart (novoLOG) injection 0-14 Units, 0-14 Units, Subcutaneous, 4x Daily With Meals & Nightly, Jose Almanza MD, 8 Units at 05/18/18 1251  •  insulin aspart (novoLOG) injection 24 Units, 24 Units, Subcutaneous, TID With Meals, Merary Burnett MD  •  insulin detemir (LEVEMIR) injection 40 Units, 40 Units, Subcutaneous, Nightly, Merary Burnett MD  •  [START ON 5/19/2018] insulin detemir (LEVEMIR) injection 60 Units, 60 Units, Subcutaneous, [facility]  •  levETIRAcetam (KEPPRA) tablet 500 mg, 500 mg, Oral, BID, Jose Almanza MD, 500 mg at 05/18/18 0941  •  oxyCODONE (ROXICODONE) immediate release tablet 5 mg, 5 mg, Oral, Q6H PRN, Jose Almanza MD, 5 mg at 05/18/18 0942  •  pantoprazole (PROTONIX) EC tablet 40 mg, 40 mg, Oral, Q AM,  Wesly Walsh MD  •  predniSONE (DELTASONE) tablet 75 mg, 75 mg, Oral, Daily With Breakfast, Gerson Olson II, MD, 75 mg at 05/18/18 0941  •  pregabalin (LYRICA) capsule 75 mg, 75 mg, Oral, BID, Jose Almanza MD, 75 mg at 05/18/18 0942  •  promethazine (PHENERGAN) tablet 25 mg, 25 mg, Oral, Q6H PRN, Jose Almanza MD, 25 mg at 05/17/18 1958  •  rifaximin (XIFAXAN) tablet 550 mg, 550 mg, Oral, BID, Jose Almanza MD, 550 mg at 05/18/18 0941  •  sodium chloride 0.9 % flush 1-10 mL, 1-10 mL, Intravenous, PRN, Jose Almanza MD  •  Insert peripheral IV, , , Once **AND** sodium chloride 0.9 % flush 10 mL, 10 mL, Intravenous, PRN, Mauro Moore MD  •  spironolactone (ALDACTONE) tablet 100 mg, 100 mg, Oral, Daily, Jose Almanza MD, 100 mg at 05/18/18 0942  •  sucralfate (CARAFATE) tablet 1 g, 1 g, Oral, BID, Jose Almanza MD, 1 g at 05/18/18 0941  •  trimethobenzamide (TIGAN) capsule 300 mg, 300 mg, Oral, TID, Jose Almanza MD, 300 mg at 05/18/18 0942    Assessment/Plan     Active Hospital Problems (** Indicates Principal Problem)    Diagnosis Date Noted   • **Acute ITP [D69.3] 05/18/2018   • Fever [R50.9] 05/17/2018   • Secondary esophageal varices without bleeding [I85.10] 02/22/2017   • Systemic lupus [M32.9] 02/22/2017   • Hematemesis [K92.0] 02/15/2017   • DUKES (nonalcoholic steatohepatitis) [K75.81] 06/14/2016   • Type 2 diabetes mellitus, uncontrolled, with neuropathy [E11.40, E11.65] 03/15/2016   • Cirrhosis of liver [K74.60] 03/08/2016   • Rheumatoid arthritis [M06.9] 03/08/2016   • Anxiety and depression [F41.8] 03/08/2016      Resolved Hospital Problems    Diagnosis Date Noted Date Resolved   • Thrombocytopenia [D69.6] 05/16/2018 05/18/2018   • Secondary esophageal varices with bleeding [I85.11] 03/07/2017 05/17/2018       Type 2 diabetes mellitus-severely uncontrolled  Hba1c - 11.2%   Patient's blood sugars are significantly elevated into 300s range  Prednisone could be  "resulting in these elevated blood sugars  Patient is extremely labile due to high and low blood sugars  Will make the below insulin changes next line will increase levemir to 60 units in the morning  Will increase the bedtime levemir to 40 units at bedtime  Will increase NovoLog to 24 units with each meal  Will cover with a high dose NovoLog sliding scale    Blood sugars are severely exacerbated.       ITP  Currently on prednisone 75 mg oral daily  Insulin regimen needs to be adjusted based on patient's blood sugar trend.    Discussed the plan with the nurse.    Hyperlipidemia  Will start Lipitor 20 mg oral daily.        Merary Burnett MD.  05/18/18  3:47 PM      EMR Dragon / transcription disclaimer:    \"Dictated utilizing Dragon dictation\".   "

## 2018-05-18 NOTE — PLAN OF CARE
Problem: Patient Care Overview  Goal: Plan of Care Review  Outcome: Ongoing (interventions implemented as appropriate)   05/18/18 4792   Coping/Psychosocial   Plan of Care Reviewed With patient   Plan of Care Review   Progress no change   OTHER   Outcome Summary Pt was very tired and weak tonight. Pt complained of nausea and pain in abd. Pt was little anxious and asked for xanax. Pt was able to sleep most the night. Cont to monitor, safety maintained.      Goal: Interprofessional Rounds/Family Conf  Outcome: Outcome(s) achieved Date Met: 05/18/18      Problem: Fall Risk (Adult)  Goal: Absence of Fall  Outcome: Ongoing (interventions implemented as appropriate)      Problem: Liver Failure, Acute/Chronic (Adult)  Goal: Signs and Symptoms of Listed Potential Problems Will be Absent, Minimized or Managed (Liver Failure, Acute/Chronic)  Outcome: Ongoing (interventions implemented as appropriate)

## 2018-05-19 LAB
ANION GAP SERPL CALCULATED.3IONS-SCNC: 11.7 MMOL/L
BASOPHILS # BLD AUTO: 0.03 10*3/MM3 (ref 0–0.2)
BASOPHILS NFR BLD AUTO: 1 % (ref 0–1.5)
BUN BLD-MCNC: 16 MG/DL (ref 6–20)
BUN/CREAT SERPL: 23.2 (ref 7–25)
CALCIUM SPEC-SCNC: 9.1 MG/DL (ref 8.6–10.5)
CHLORIDE SERPL-SCNC: 102 MMOL/L (ref 98–107)
CO2 SERPL-SCNC: 24.3 MMOL/L (ref 22–29)
CREAT BLD-MCNC: 0.69 MG/DL (ref 0.57–1)
CRYPTOC AG CSF QL: NEGATIVE
DEPRECATED RDW RBC AUTO: 48.6 FL (ref 37–54)
EOSINOPHIL # BLD AUTO: 0.02 10*3/MM3 (ref 0–0.7)
EOSINOPHIL NFR BLD AUTO: 0.7 % (ref 0.3–6.2)
ERYTHROCYTE [DISTWIDTH] IN BLOOD BY AUTOMATED COUNT: 15.6 % (ref 11.7–13)
FERRITIN SERPL-MCNC: 51.15 NG/ML (ref 13–150)
GFR SERPL CREATININE-BSD FRML MDRD: 88 ML/MIN/1.73
GLUCOSE BLD-MCNC: 111 MG/DL (ref 65–99)
GLUCOSE BLDC GLUCOMTR-MCNC: 103 MG/DL (ref 70–130)
GLUCOSE BLDC GLUCOMTR-MCNC: 215 MG/DL (ref 70–130)
GLUCOSE BLDC GLUCOMTR-MCNC: 267 MG/DL (ref 70–130)
GLUCOSE BLDC GLUCOMTR-MCNC: 338 MG/DL (ref 70–130)
GLUCOSE BLDC GLUCOMTR-MCNC: 366 MG/DL (ref 70–130)
HCT VFR BLD AUTO: 30.5 % (ref 35.6–45.5)
HGB BLD-MCNC: 9.9 G/DL (ref 11.9–15.5)
HGB RETIC QN: 31.6 PG (ref 32.7–38.6)
IMM GRANULOCYTES # BLD: 0.07 10*3/MM3 (ref 0–0.03)
IMM GRANULOCYTES NFR BLD: 2.4 % (ref 0–0.5)
IMM RETICS NFR: 8.9 % (ref 0.7–13.7)
IRON 24H UR-MRATE: 50 MCG/DL (ref 37–145)
IRON SATN MFR SERPL: 10 % (ref 20–50)
LYMPHOCYTES # BLD AUTO: 0.72 10*3/MM3 (ref 0.9–4.8)
LYMPHOCYTES NFR BLD AUTO: 24.4 % (ref 19.6–45.3)
MCH RBC QN AUTO: 27.7 PG (ref 26.9–32)
MCHC RBC AUTO-ENTMCNC: 32.5 G/DL (ref 32.4–36.3)
MCV RBC AUTO: 85.2 FL (ref 80.5–98.2)
MONOCYTES # BLD AUTO: 0.32 10*3/MM3 (ref 0.2–1.2)
MONOCYTES NFR BLD AUTO: 10.8 % (ref 5–12)
NEUTROPHILS # BLD AUTO: 1.86 10*3/MM3 (ref 1.9–8.1)
NEUTROPHILS NFR BLD AUTO: 63.1 % (ref 42.7–76)
NRBC BLD MANUAL-RTO: 0 /100 WBC (ref 0–0)
PLATELET # BLD AUTO: 5 10*3/MM3 (ref 140–500)
PMV BLD AUTO: ABNORMAL FL (ref 6–12)
POTASSIUM BLD-SCNC: 3.7 MMOL/L (ref 3.5–5.2)
RBC # BLD AUTO: 3.58 10*6/MM3 (ref 3.9–5.2)
RETICS/RBC NFR AUTO: 2.72 % (ref 0.5–1.5)
SODIUM BLD-SCNC: 138 MMOL/L (ref 136–145)
TIBC SERPL-MCNC: 477 MCG/DL (ref 298–536)
TRANSFERRIN SERPL-MCNC: 320 MG/DL (ref 200–360)
WBC NRBC COR # BLD: 2.95 10*3/MM3 (ref 4.5–10.7)

## 2018-05-19 PROCEDURE — 82962 GLUCOSE BLOOD TEST: CPT

## 2018-05-19 PROCEDURE — 99232 SBSQ HOSP IP/OBS MODERATE 35: CPT | Performed by: INTERNAL MEDICINE

## 2018-05-19 PROCEDURE — 82728 ASSAY OF FERRITIN: CPT | Performed by: INTERNAL MEDICINE

## 2018-05-19 PROCEDURE — 25010000002 IMMUNE GLOBULIN (HUMAN) 30 GM/300ML SOLUTION: Performed by: INTERNAL MEDICINE

## 2018-05-19 PROCEDURE — 63710000001 INSULIN ASPART PER 5 UNITS: Performed by: INTERNAL MEDICINE

## 2018-05-19 PROCEDURE — 63710000001 DIPHENHYDRAMINE PER 50 MG: Performed by: INTERNAL MEDICINE

## 2018-05-19 PROCEDURE — 99233 SBSQ HOSP IP/OBS HIGH 50: CPT | Performed by: INTERNAL MEDICINE

## 2018-05-19 PROCEDURE — 63710000001 PREDNISONE PER 1 MG: Performed by: INTERNAL MEDICINE

## 2018-05-19 PROCEDURE — 63710000001 PREDNISONE PER 5 MG: Performed by: INTERNAL MEDICINE

## 2018-05-19 PROCEDURE — 63710000001 PROMETHAZINE PER 25 MG: Performed by: HOSPITALIST

## 2018-05-19 PROCEDURE — 84466 ASSAY OF TRANSFERRIN: CPT | Performed by: INTERNAL MEDICINE

## 2018-05-19 PROCEDURE — 83540 ASSAY OF IRON: CPT | Performed by: INTERNAL MEDICINE

## 2018-05-19 PROCEDURE — 25010000002 IRON SUCROSE PER 1 MG: Performed by: INTERNAL MEDICINE

## 2018-05-19 PROCEDURE — 80048 BASIC METABOLIC PNL TOTAL CA: CPT | Performed by: HOSPITALIST

## 2018-05-19 PROCEDURE — 85046 RETICYTE/HGB CONCENTRATE: CPT | Performed by: INTERNAL MEDICINE

## 2018-05-19 PROCEDURE — 85025 COMPLETE CBC W/AUTO DIFF WBC: CPT | Performed by: INTERNAL MEDICINE

## 2018-05-19 PROCEDURE — 63710000001 INSULIN ASPART PER 5 UNITS: Performed by: HOSPITALIST

## 2018-05-19 RX ORDER — ACETAMINOPHEN 325 MG/1
325 TABLET ORAL DAILY
Status: ACTIVE | OUTPATIENT
Start: 2018-05-19 | End: 2018-05-20

## 2018-05-19 RX ORDER — DIPHENHYDRAMINE HCL 25 MG
25 CAPSULE ORAL DAILY
Status: COMPLETED | OUTPATIENT
Start: 2018-05-19 | End: 2018-05-20

## 2018-05-19 RX ADMIN — DULOXETINE HYDROCHLORIDE 90 MG: 60 CAPSULE, DELAYED RELEASE ORAL at 09:22

## 2018-05-19 RX ADMIN — PREGABALIN 75 MG: 75 CAPSULE ORAL at 21:33

## 2018-05-19 RX ADMIN — LEVETIRACETAM 500 MG: 500 TABLET, FILM COATED ORAL at 21:33

## 2018-05-19 RX ADMIN — SUCRALFATE 1 G: 1 TABLET ORAL at 09:21

## 2018-05-19 RX ADMIN — FERROUS SULFATE TAB 325 MG (65 MG ELEMENTAL FE) 325 MG: 325 (65 FE) TAB at 21:33

## 2018-05-19 RX ADMIN — PROMETHAZINE HYDROCHLORIDE 25 MG: 25 TABLET ORAL at 18:48

## 2018-05-19 RX ADMIN — ALPRAZOLAM 0.5 MG: 0.5 TABLET ORAL at 22:57

## 2018-05-19 RX ADMIN — FOLIC ACID 1 MG: 1 TABLET ORAL at 09:22

## 2018-05-19 RX ADMIN — IMMUNE GLOBULIN INFUSION (HUMAN) 30 G: 100 INJECTION, SOLUTION INTRAVENOUS; SUBCUTANEOUS at 09:23

## 2018-05-19 RX ADMIN — Medication 10 ML: at 21:34

## 2018-05-19 RX ADMIN — CLOTRIMAZOLE 10 MG: 10 LOZENGE ORAL at 18:50

## 2018-05-19 RX ADMIN — INSULIN ASPART 10 UNITS: 100 INJECTION, SOLUTION INTRAVENOUS; SUBCUTANEOUS at 18:49

## 2018-05-19 RX ADMIN — BUPROPION HYDROCHLORIDE 150 MG: 150 TABLET, EXTENDED RELEASE ORAL at 09:22

## 2018-05-19 RX ADMIN — CLOTRIMAZOLE 10 MG: 10 LOZENGE ORAL at 21:33

## 2018-05-19 RX ADMIN — PREDNISONE 75 MG: 5 TABLET ORAL at 09:22

## 2018-05-19 RX ADMIN — SUCRALFATE 1 G: 1 TABLET ORAL at 21:33

## 2018-05-19 RX ADMIN — PREGABALIN 75 MG: 75 CAPSULE ORAL at 09:21

## 2018-05-19 RX ADMIN — INSULIN ASPART 12 UNITS: 100 INJECTION, SOLUTION INTRAVENOUS; SUBCUTANEOUS at 21:33

## 2018-05-19 RX ADMIN — FERROUS SULFATE TAB 325 MG (65 MG ELEMENTAL FE) 325 MG: 325 (65 FE) TAB at 09:22

## 2018-05-19 RX ADMIN — SPIRONOLACTONE 100 MG: 100 TABLET ORAL at 09:21

## 2018-05-19 RX ADMIN — CLOTRIMAZOLE 10 MG: 10 LOZENGE ORAL at 12:22

## 2018-05-19 RX ADMIN — IRON SUCROSE 300 MG: 20 INJECTION, SOLUTION INTRAVENOUS at 16:41

## 2018-05-19 RX ADMIN — CLOTRIMAZOLE 10 MG: 10 LOZENGE ORAL at 07:04

## 2018-05-19 RX ADMIN — PANTOPRAZOLE SODIUM 40 MG: 40 TABLET, DELAYED RELEASE ORAL at 07:04

## 2018-05-19 RX ADMIN — TRIMETHOBENZAMIDE HYDROCHLORIDE 300 MG: 300 CAPSULE ORAL at 09:22

## 2018-05-19 RX ADMIN — DIPHENHYDRAMINE HYDROCHLORIDE 25 MG: 25 CAPSULE ORAL at 09:21

## 2018-05-19 RX ADMIN — CLOTRIMAZOLE 10 MG: 10 LOZENGE ORAL at 16:40

## 2018-05-19 RX ADMIN — INSULIN ASPART 24 UNITS: 100 INJECTION, SOLUTION INTRAVENOUS; SUBCUTANEOUS at 12:23

## 2018-05-19 RX ADMIN — OXYCODONE HYDROCHLORIDE 5 MG: 5 TABLET ORAL at 18:48

## 2018-05-19 RX ADMIN — RIFAXIMIN 550 MG: 550 TABLET ORAL at 09:21

## 2018-05-19 RX ADMIN — INSULIN ASPART 3 UNITS: 100 INJECTION, SOLUTION INTRAVENOUS; SUBCUTANEOUS at 12:22

## 2018-05-19 RX ADMIN — INSULIN ASPART 30 UNITS: 100 INJECTION, SOLUTION INTRAVENOUS; SUBCUTANEOUS at 18:49

## 2018-05-19 RX ADMIN — CYCLOSPORINE 1 DROP: 0.5 EMULSION OPHTHALMIC at 09:22

## 2018-05-19 RX ADMIN — ACETAMINOPHEN 650 MG: 325 TABLET, FILM COATED ORAL at 09:22

## 2018-05-19 RX ADMIN — LEVETIRACETAM 500 MG: 500 TABLET, FILM COATED ORAL at 09:22

## 2018-05-19 RX ADMIN — CYCLOSPORINE 1 DROP: 0.5 EMULSION OPHTHALMIC at 21:33

## 2018-05-19 RX ADMIN — INSULIN DETEMIR 40 UNITS: 100 INJECTION, SOLUTION SUBCUTANEOUS at 21:32

## 2018-05-19 RX ADMIN — ACETAMINOPHEN 325 MG: 325 TABLET ORAL at 16:41

## 2018-05-19 RX ADMIN — POLYETHYLENE GLYCOL 3350 17 G: 17 POWDER, FOR SOLUTION ORAL at 12:22

## 2018-05-19 RX ADMIN — TRIMETHOBENZAMIDE HYDROCHLORIDE 300 MG: 300 CAPSULE ORAL at 16:40

## 2018-05-19 RX ADMIN — RIFAXIMIN 550 MG: 550 TABLET ORAL at 22:57

## 2018-05-19 RX ADMIN — DIPHENHYDRAMINE HYDROCHLORIDE 25 MG: 25 CAPSULE ORAL at 16:41

## 2018-05-19 RX ADMIN — TRIMETHOBENZAMIDE HYDROCHLORIDE 300 MG: 300 CAPSULE ORAL at 21:33

## 2018-05-19 NOTE — PROGRESS NOTES
"        REASON FOR FOLLOWUP/CHIEF COMPLAINT:  ITP    HISTORY OF PRESENT ILLNESS:   Denies bleeding.  No epistaxis since admission.  Denies bruising other than some bruising of the sites of her subcutaneous injections.    Past Medical History, Past Surgical History, Social History, Family History have been reviewed and are without significant changes except as mentioned.    Review of Systems   Review of Systems   Constitutional: Negative for activity change.   HENT: Negative for nosebleeds and trouble swallowing.    Respiratory: Negative for shortness of breath and wheezing.    Cardiovascular: Negative for chest pain and palpitations.   Gastrointestinal: Negative for constipation, diarrhea and nausea.   Genitourinary: Negative for dysuria and hematuria.   Musculoskeletal: Negative for arthralgias and myalgias.   Skin: Negative for rash and wound.   Neurological: Negative for seizures and syncope.   Hematological: Negative for adenopathy. Bruises/bleeds easily.   Psychiatric/Behavioral: Negative for confusion.       Medications:  The current medication list was reviewed in the EMR    ALLERGIES:    Allergies   Allergen Reactions   • Albuterol Anaphylaxis   • Lactulose Nausea And Vomiting and Other (See Comments)     Severe abdominal pain   • Tramadol Other (See Comments)     Per pt causes her \"palsy, meaning shaking and tremors\" and gi upset, nausea   • Quinine Derivatives Nausea And Vomiting              Vitals:    05/18/18 1827 05/18/18 1900 05/18/18 2300 05/19/18 0726   BP: 159/85 152/87 139/86 129/72   BP Location:  Right arm Right arm Right arm   Patient Position:  Lying Lying Lying   Pulse: (!) 121 106 103 98   Resp: 20 18 18 18   Temp: 98.9 °F (37.2 °C) 98.3 °F (36.8 °C) 98.3 °F (36.8 °C)    TempSrc: Oral Oral Oral    SpO2:       Weight:       Height:         Physical Exam    CONSTITUTIONAL:  Vital signs reviewed.  No distress, looks comfortable.  EYES:  Conjunctiva and lids unremarkable.  " PERRLA  EARS,NOSE,MOUTH,THROAT:  Ears and nose appear unremarkable.  Lips, teeth, gums appear unremarkable.  No mucosal bleeding.  RESPIRATORY:  Normal respiratory effort.  Lungs clear to auscultation bilaterally.  CARDIOVASCULAR:  Normal S1, S2.  No murmurs rubs or gallops.  No significant lower extremity edema.  GASTROINTESTINAL: Abdomen appears unremarkable.  Nontender.  No hepatomegaly.  No splenomegaly.  NEURO: cranial nerves 2-12 grossly intact.  No focal deficits.  Appears to have equal strength all 4 extremities.  MUSCULOSKELETAL:  Unremarkable digits/nails.  No cyanosis or clubbing.  SKIN:  Warm.  No rashes.slight worsening of bruising at the site of prior subcutaneous injections on the abdomen  PSYCHIATRIC:  Normal judgment and insight.  Normal mood and affect.         RECENT LABS:  WBC   Date Value Ref Range Status   05/19/2018 2.95 (L) 4.50 - 10.70 10*3/mm3 Final   05/18/2018 2.01 (L) 4.50 - 10.70 10*3/mm3 Final   05/17/2018 3.09 (L) 4.50 - 10.70 10*3/mm3 Final   05/17/2018 2.78 (L) 4.50 - 10.70 10*3/mm3 Final   05/16/2018 2.36 (L) 4.50 - 10.70 10*3/mm3 Final     Hemoglobin   Date Value Ref Range Status   05/19/2018 9.9 (L) 11.9 - 15.5 g/dL Final   05/18/2018 9.6 (L) 11.9 - 15.5 g/dL Final   05/17/2018 10.7 (L) 11.9 - 15.5 g/dL Final   05/17/2018 10.4 (L) 11.9 - 15.5 g/dL Final   05/16/2018 11.4 (L) 11.9 - 15.5 g/dL Final     Platelets   Date Value Ref Range Status   05/19/2018 5 (C) 140 - 500 10*3/mm3 Final   05/18/2018 4 (C) 140 - 500 10*3/mm3 Final   05/17/2018 4 (C) 140 - 500 10*3/mm3 Final   05/17/2018 8 (C) 140 - 500 10*3/mm3 Final   05/16/2018 6 (C) 140 - 500 10*3/mm3 Final   05/16/2018 3 (C) 140 - 500 10*3/mm3 Final                 ASSESSMENT/PLAN:     *ITP.  Appears to be most consistent with ITP.  Peripheral smear shows no significant amount of schistocytes and no platelet clumping.  IPF elevated at 18.4%, consistent with a destructive etiology of thrombocytopenia.  Unremarkable: Folate, B12,  fibrinogen, PTT.  Did not respond to platelet transfusion, also consistent with a destructive process.    Her only new medicine is Tigan, started mid April 2018 for nausea from gastroparesis.  Upon my review on Micromedex, blood dyscrasias have been reported, but no mention of immune mediated thrombocytopenia.  I doubt this is the cause of the platelet drop.  PLT unchanged this morning, but this is not surprising as this is only day 3 of prednisone and IVIG.   PLT 5 today.    *Mild elevation in INR at 1.26.  Suspect this is due to cirrhosis.     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  WBC improved from 2 up to 3.  Hb relatively stable.     *B12 deficiency.  On oral B12 since December 2013.     *History of esophageal varices banding.     *Iron deficiency anemia due to GI bleeding.  Has been on oral iron twice per day.  Ferritin 51 with 10% saturation.  This suggests iron deficiency.  300 mg Venofer daily ×2.  I explained IV iron as a possibility of life-threatening allergic reactions.  Patient understands this and wants to proceed.     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  This is of unknown significance.     *Rheumatoid arthritis.  Reports she hasn't been unable to take Enbrel since December due to persistent fevers.     *Lupus     *Diabetes.  Hyperglycemia from steroids may be a problem.  Defer to hospitalist.     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     *Fever of up to 101 for a couple months.  She was trying to have a 48 hour time period with no fever associated started back on Enbrel.  Dr. Huertas infectious diseases been consulted.  CT chest 5/17/18 negative for an etiology fever.  It did have changes suggestive of pancreatitis.  CT images personally reviewed by me.     Plan  · Continue prednisone 75 mg daily  Day 3  · IVIG  400 mg/kg daily day 3 of 5 days  · Venofer 300 mg daily day 1 of 2.  · Defer expected hyperglycemia from steroids to hospitalist/endocrinology services.  · She usually sees Dr. Mcdowell in our office.

## 2018-05-19 NOTE — PROGRESS NOTES
57 y.o.   LOS: 3 days   Patient Care Team:  Blanca Pitts MD as PCP - General (Internal Medicine)  Sukhdeep Mcdowell MD as Consulting Physician (Hematology and Oncology)  Blanca Pitts MD as Referring Physician (Internal Medicine)    Chief Complaint:  Uncontrolled type 2 diabetes mellitus    Chief Complaint   Patient presents with   • Dizziness   • Nose Bleed       Subjective     HPI  Patient's blood sugars are elevated into the 200 range  She is eating reasonably well  No hypoglycemia reported  She does report some nausea  Interval History:  Patient is a 57-year-old white female with a history of uncontrolled type 2 diabetes mellitus with insulin resistance and is currently on Humulin U-500 insulin at home  She is currently in the hospital using Levemir and NovoLog  She is getting Levemir twice daily and NovoLog 2 times daily before each meal  Her blood sugars are still elevated at 7 patient is currently on steroids which is complicating diabetes management    Review of Systems:      Review of Systems   Constitutional: Positive for appetite change and fatigue.   Respiratory: Negative.    Cardiovascular: Negative.    Gastrointestinal: Positive for nausea.   Hematological: Bruises/bleeds easily.   All other systems reviewed and are negative.    Objective     Vital Signs   Temp:  [97.8 °F (36.6 °C)-98.9 °F (37.2 °C)] 97.8 °F (36.6 °C)  Heart Rate:  [] 104  Resp:  [18-20] 18  BP: (114-159)/(63-87) 139/86    Physical Exam:  Physical Exam   Constitutional: She is oriented to person, place, and time.   Eyes: EOM are normal. Pupils are equal, round, and reactive to light.   Cardiovascular: Normal rate, regular rhythm, normal heart sounds and intact distal pulses.    Pulmonary/Chest: Effort normal and breath sounds normal.   Abdominal: Soft. Bowel sounds are normal.   Musculoskeletal: Normal range of motion. She exhibits no edema.   Neurological: She is alert and oriented to person, place, and time.   Skin: Skin is  warm and dry.   Psychiatric: She has a normal mood and affect. Her behavior is normal.   Nursing note and vitals reviewed.  Results Review:     I reviewed the patient's new clinical results.      Glucose   Date/Time Value Ref Range Status   05/19/2018 0450 111 (H) 65 - 99 mg/dL Final   05/18/2018 0425 323 (H) 65 - 99 mg/dL Final   05/17/2018 0358 207 (H) 65 - 99 mg/dL Final   05/16/2018 1943 347 (H) 65 - 99 mg/dL Final     Lab Results (last 72 hours)     Procedure Component Value Units Date/Time    POC Glucose Once [[id number]]  (Abnormal) Collected:  05/19/18 1601    Specimen:  Blood Updated:  05/19/18 1602     Glucose 338 (H) mg/dL     Narrative:       Meter: [id number] : [id number] Mil JOE    POC Glucose Once [[id number]]  (Abnormal) Collected:  05/19/18 1113    Specimen:  Blood Updated:  05/19/18 1114     Glucose 215 (H) mg/dL     Narrative:       Meter: [id number] : [id number] Benjamín Paulino CNA    Cryptococcal Antigen [[id number]]  (Normal) Collected:  05/17/18 0049    Specimen:  Blood Updated:  05/19/18 0711     Crypto Ag Negative    POC Glucose Once [[id number]]  (Normal) Collected:  05/19/18 0620    Specimen:  Blood Updated:  05/19/18 0621     Glucose 103 mg/dL     Narrative:       Meter: [id number] : [id number] Izaiah JOE    Ferritin [[id number]]  (Normal) Collected:  05/19/18 0450    Specimen:  Blood Updated:  05/19/18 0557     Ferritin 51.15 ng/mL     Basic Metabolic Panel [[id number]]  (Abnormal) Collected:  05/19/18 0450    Specimen:  Blood Updated:  05/19/18 0549     Glucose 111 (H) mg/dL      BUN 16 mg/dL      Creatinine 0.69 mg/dL      Sodium 138 mmol/L      Potassium 3.7 mmol/L      Chloride 102 mmol/L      CO2 24.3 mmol/L      Calcium 9.1 mg/dL      eGFR Non African Amer 88 mL/min/1.73      BUN/Creatinine Ratio 23.2     Anion Gap 11.7 mmol/L     Narrative:       GFR Normal >60  Chronic Kidney Disease <60  Kidney Failure <15    Iron Profile [[id number]]  (Abnormal) Collected:   05/19/18 0450    Specimen:  Blood Updated:  05/19/18 0549     Iron 50 mcg/dL      Iron Saturation 10 (L) %      Transferrin 320 mg/dL      TIBC 477 mcg/dL     CBC & Differential [[id number]] Collected:  05/19/18 0450    Specimen:  Blood Updated:  05/19/18 0537    Narrative:       The following orders were created for panel order CBC & Differential.  Procedure                               Abnormality         Status                     ---------                               -----------         ------                     Scan Slide[[id number]]                                                                  CBC Auto Differential[[id number]]        Abnormal            Final result                 Please view results for these tests on the individual orders.    CBC Auto Differential [[id number]]  (Abnormal) Collected:  05/19/18 0450    Specimen:  Blood Updated:  05/19/18 0537     WBC 2.95 (L) 10*3/mm3      RBC 3.58 (L) 10*6/mm3      Hemoglobin 9.9 (L) g/dL      Hematocrit 30.5 (L) %      MCV 85.2 fL      MCH 27.7 pg      MCHC 32.5 g/dL      RDW 15.6 (H) %      RDW-SD 48.6 fl      MPV -- fL      Comment: .        Platelets 5 (C) 10*3/mm3      Neutrophil % 63.1 %      Lymphocyte % 24.4 %      Monocyte % 10.8 %      Eosinophil % 0.7 %      Basophil % 1.0 %      Immature Grans % 2.4 (H) %      Neutrophils, Absolute 1.86 (L) 10*3/mm3      Lymphocytes, Absolute 0.72 (L) 10*3/mm3      Monocytes, Absolute 0.32 10*3/mm3      Eosinophils, Absolute 0.02 10*3/mm3      Basophils, Absolute 0.03 10*3/mm3      Immature Grans, Absolute 0.07 (H) 10*3/mm3      nRBC 0.0 /100 WBC     Retic With IRF & RET-He [[id number]]  (Abnormal) Collected:  05/19/18 0450    Specimen:  Blood Updated:  05/19/18 0527     Immature Reticulocyte Fraction 8.9 %      Reticulocyte % 2.72 (H) %      Reticulocyte Hgb 31.6 (L) pg     Blood Culture - Blood, [[id number]]  (Normal) Collected:  05/17/18 0054    Specimen:  Blood from Arm, Left Updated:  05/19/18 0100      Blood Culture No growth at 2 days    Blood Culture - Blood, [[id number]]  (Normal) Collected:  05/17/18 0049    Specimen:  Blood from Arm, Right Updated:  05/19/18 0100     Blood Culture No growth at 2 days    POC Glucose Once [[id number]]  (Abnormal) Collected:  05/19/18 0028    Specimen:  Blood Updated:  05/19/18 0029     Glucose 267 (H) mg/dL     Narrative:       Meter: [id number] : [id number] [facility] TATYANA    POC Glucose Once [[id number]]  (Abnormal) Collected:  05/18/18 2037    Specimen:  Blood Updated:  05/18/18 2042     Glucose 452 (C) mg/dL     Narrative:       RN Notified R and V Meter: [id number] : [id number] Alvino JOE    POC Glucose Once [[id number]]  (Abnormal) Collected:  05/18/18 2034    Specimen:  Blood Updated:  05/18/18 2042     Glucose 414 (H) mg/dL     Narrative:       RN Notified R and V Repeat Test Meter: [id number] : [id number] Alvino Rubio    POC Glucose Once [[id number]]  (Abnormal) Collected:  05/18/18 1630    Specimen:  Blood Updated:  05/18/18 1631     Glucose 336 (H) mg/dL     Narrative:       Meter: [id number] : [id number] Gary JOE    SOFI Comprehensive Panel [[id number]] Collected:  05/17/18 0049    Specimen:  Blood Updated:  05/18/18 1518     Anti-DNA (DS) Ab Qn 1 IU/mL      Comment:                                    Negative      <5                                     Equivocal  5 - 9                                     Positive      >9        RNP Antibodies 0.4 AI      Mendoza Antibodies <0.2 AI      Antiscleroderma-70 Antibodies <0.2 AI      TOBI SSA (RO) Ab <0.2 AI      TOBI SSB (LA) Ab <0.2 AI      Antichromatin Antibodies <0.2 AI      ADAN-1 IgG <0.2 AI      Anti-Centromere B Antibodies <0.2 AI      See below: Comment     Comment: Autoantibody                       Disease Association  ------------------------------------------------------------                          Condition                  Frequency  ---------------------    ------------------------   ---------  Antinuclear Antibody,    SLE, mixed connective  Direct (SOFI-D)           tissue diseases  ---------------------   ------------------------   ---------  dsDNA                    SLE                        40 - 60%  ---------------------   ------------------------   ---------  Chromatin                Drug induced SLE                90%                           SLE                        48 - 97%  ---------------------   ------------------------   ---------  SSA (Ro)                 SLE                        25 - 35%                           Sjogren's Syndrome         40 - 70%                            Lupus                 100%  ---------------------   ------------------------   ---------  SSB (La)                 SLE                             10%                           Sjogren's Syndrome              30%  ---------------------   -----------------------    ---------  Sm (anti-Smith)          SLE                        15 - 30%  ---------------------   -----------------------    ---------  RNP                      Mixed Connective Tissue                           Disease                         95%  (U1 nRNP,                SLE                        30 - 50%  anti-ribonucleoprotein)  Polymyositis and/or                           Dermatomyositis                 20%  ---------------------   ------------------------   ---------  Scl-70 (antiDNA          Scleroderma (diffuse)      20 - 35%  topoisomerase)           Crest                           13%  ---------------------   ------------------------   ---------  Basia-1                     Polymyositis and/or                           Dermatomyositis            20 - 40%  ---------------------   ------------------------   ---------  Centromere B             Scleroderma - Crest                           variant                         80%       Narrative:       Performed at:  [facility]  [address] [facility],  [place]  [id number]  : Quan Aguilar PhD, Phone:  [phone]    POC Glucose Once [[id number]]  (Abnormal) Collected:  05/18/18 1144    Specimen:  Blood Updated:  05/18/18 1146     Glucose 295 (H) mg/dL     Narrative:       Meter: [id number] : [id number] EmilealZanegume Killian TATYANA    Fungitell B-D Glucan [[id number]] Collected:  05/18/18 1057    Specimen:  Blood Updated:  05/18/18 1123    Hepatitis B & C Profile [[id number]] Collected:  05/18/18 1057    Specimen:  Blood Updated:  05/18/18 1122    Amylase [[id number]]  (Abnormal) Collected:  05/18/18 0425    Specimen:  Blood Updated:  05/18/18 1028     Amylase 16 (L) U/L     Lipase [[id number]]  (Abnormal) Collected:  05/18/18 0425    Specimen:  Blood Updated:  05/18/18 1028     Lipase 9 (L) U/L     POC Glucose Once [[id number]]  (Abnormal) Collected:  05/18/18 0634    Specimen:  Blood Updated:  05/18/18 0635     Glucose 334 (H) mg/dL     Narrative:       Meter: [id number] : [id number] Connerkourtney Fagan TATYANA    Basic Metabolic Panel [[id number]]  (Abnormal) Collected:  05/18/18 0425    Specimen:  Blood Updated:  05/18/18 0538     Glucose 323 (H) mg/dL      BUN 13 mg/dL      Creatinine 0.70 mg/dL      Sodium 136 mmol/L      Potassium 4.4 mmol/L      Chloride 100 mmol/L      CO2 23.6 mmol/L      Calcium 8.8 mg/dL      eGFR Non African Amer 87 mL/min/1.73      BUN/Creatinine Ratio 18.6     Anion Gap 12.4 mmol/L     Narrative:       GFR Normal >60  Chronic Kidney Disease <60  Kidney Failure <15    CBC & Differential [[id number]] Collected:  05/18/18 0425    Specimen:  Blood Updated:  05/18/18 0522    Narrative:       The following orders were created for panel order CBC & Differential.  Procedure                               Abnormality         Status                     ---------                               -----------         ------                     Scan Slide[[id number]]                                                                  CBC Auto  Differential[[id number]]        Abnormal            Final result                 Please view results for these tests on the individual orders.    CBC Auto Differential [[id number]]  (Abnormal) Collected:  05/18/18 0425    Specimen:  Blood Updated:  05/18/18 0522     WBC 2.01 (L) 10*3/mm3      RBC 3.51 (L) 10*6/mm3      Hemoglobin 9.6 (L) g/dL      Hematocrit 29.9 (L) %      MCV 85.2 fL      MCH 27.4 pg      MCHC 32.1 (L) g/dL      RDW 15.4 (H) %      RDW-SD 48.0 fl      MPV -- fL      Comment: .        Platelets 4 (C) 10*3/mm3      Neutrophil % 63.2 %      Lymphocyte % 24.4 %      Monocyte % 11.4 %      Eosinophil % 0.5 %      Basophil % 0.5 %      Immature Grans % 1.0 (H) %      Neutrophils, Absolute 1.27 (L) 10*3/mm3      Lymphocytes, Absolute 0.49 (L) 10*3/mm3      Monocytes, Absolute 0.23 10*3/mm3      Eosinophils, Absolute 0.01 10*3/mm3      Basophils, Absolute 0.01 10*3/mm3      Immature Grans, Absolute 0.02 10*3/mm3      nRBC 0.0 /100 WBC     POC Glucose Once [[id number]]  (Abnormal) Collected:  05/17/18 2135    Specimen:  Blood Updated:  05/17/18 2136     Glucose 347 (H) mg/dL     Narrative:       Meter: [id number] : [id number] Conner JOE    POC Glucose Once [[id number]]  (Abnormal) Collected:  05/17/18 1651    Specimen:  Blood Updated:  05/17/18 1653     Glucose 437 (H) mg/dL     Narrative:       RN Notified R and V Meter: [id number] : [id number] Tj STEPHENS CNA    TSPOT [[id number]] Collected:  05/17/18 0049    Specimen:  Blood Updated:  05/17/18 1425     TSPOTTB Non-reportable due to insufficient cells.  Need 2x10e6 of peripheral blood mononuclear cells (PBMC) from whole blood. Recommend recollection of specimen. Two full tubes required for retest.     T-SPOT Panel A 0     T-SPOT Panel B 0     TSPOT NIL CONTROL INTERP Failed     TSPOT POS CONTROL INTERP Failed    POC Glucose Once [[id number]]  (Abnormal) Collected:  05/17/18 1057    Specimen:  Blood Updated:  05/17/18 1058     Glucose 493 (C)  mg/dL     Narrative:       Repeat Test Meter: [id number] : [id number] Mil JOE    POC Glucose Once [[id number]]  (Abnormal) Collected:  05/17/18 0614    Specimen:  Blood Updated:  05/17/18 0615     Glucose 303 (H) mg/dL     Narrative:       Meter: [id number] : [id number] Conner JOE    Sedimentation Rate [[id number]]  (Abnormal) Collected:  05/17/18 0358    Specimen:  Blood Updated:  05/17/18 0534     Sed Rate 44 (H) mm/hr     CBC & Differential [[id number]] Collected:  05/17/18 0358    Specimen:  Blood Updated:  05/17/18 0513    Narrative:       The following orders were created for panel order CBC & Differential.  Procedure                               Abnormality         Status                     ---------                               -----------         ------                     Scan Slide[[id number]]                                                                  CBC Auto Differential[[id number]]        Abnormal            Final result                 Please view results for these tests on the individual orders.    CBC Auto Differential [[id number]]  (Abnormal) Collected:  05/17/18 0358    Specimen:  Blood Updated:  05/17/18 0513     WBC 3.09 (L) 10*3/mm3      RBC 3.87 (L) 10*6/mm3      Hemoglobin 10.7 (L) g/dL      Hematocrit 33.4 (L) %      MCV 86.3 fL      MCH 27.6 pg      MCHC 32.0 (L) g/dL      RDW 16.0 (H) %      RDW-SD 50.1 fl      MPV -- fL      Comment: .        Platelets 4 (C) 10*3/mm3      Neutrophil % 59.5 %      Lymphocyte % 25.6 %      Monocyte % 8.7 %      Eosinophil % 5.2 %      Basophil % 1.0 %      Immature Grans % 0.3 %      Neutrophils, Absolute 1.84 (L) 10*3/mm3      Lymphocytes, Absolute 0.79 (L) 10*3/mm3      Monocytes, Absolute 0.27 10*3/mm3      Eosinophils, Absolute 0.16 10*3/mm3      Basophils, Absolute 0.03 10*3/mm3      Immature Grans, Absolute 0.01 10*3/mm3      nRBC 0.0 /100 WBC     Basic Metabolic Panel [[id number]]  (Abnormal) Collected:  05/17/18 0358     Specimen:  Blood Updated:  05/17/18 0510     Glucose 207 (H) mg/dL      BUN 7 mg/dL      Creatinine 0.72 mg/dL      Sodium 139 mmol/L      Potassium 4.1 mmol/L      Chloride 102 mmol/L      CO2 24.7 mmol/L      Calcium 9.0 mg/dL      eGFR Non African Amer 84 mL/min/1.73      BUN/Creatinine Ratio 9.7     Anion Gap 12.3 mmol/L     Narrative:       GFR Normal >60  Chronic Kidney Disease <60  Kidney Failure <15    C-reactive Protein [[id number]]  (Normal) Collected:  05/17/18 0358    Specimen:  Blood Updated:  05/17/18 0510     C-Reactive Protein 0.42 mg/dL     Lactate Dehydrogenase [[id number]]  (Abnormal) Collected:  05/17/18 0049    Specimen:  Blood Updated:  05/17/18 0309      (H) U/L     Troponin [[id number]]  (Normal) Collected:  05/17/18 0049    Specimen:  Blood Updated:  05/17/18 0309     Troponin T <0.010 ng/mL     Narrative:       Troponin T Reference Ranges:  Less than 0.03 ng/mL:    Negative for AMI  0.03 to 0.09 ng/mL:      Indeterminant for AMI  Greater than 0.09 ng/mL: Positive for AMI    HIV-1 & HIV-2 Antibodies [[id number]] Collected:  05/17/18 0049    Specimen:  Blood Updated:  05/17/18 0236    Narrative:       The following orders were created for panel order HIV-1 & HIV-2 Antibodies.  Procedure                               Abnormality         Status                     ---------                               -----------         ------                     HIV-1 / O / 2 Ag / Antib...[[id number]]  Normal              Final result                 Please view results for these tests on the individual orders.    HIV-1 / O / 2 Ag / Antibody 4th Generation [[id number]]  (Normal) Collected:  05/17/18 0049    Specimen:  Blood Updated:  05/17/18 0236     HIV-1/ HIV-2 Non-Reactive     HIV-1 P24 Ag Screen Non-Reactive    POC Glucose Once [[id number]]  (Abnormal) Collected:  05/17/18 0158    Specimen:  Blood Updated:  05/17/18 0200     Glucose 207 (H) mg/dL     Narrative:       Meter: [id number] : [id number]  Conner Fagan TATYANA    Vitamin B12 [[id number]]  (Normal) Collected:  05/17/18 0049    Specimen:  Blood Updated:  05/17/18 0147     Vitamin B-12 939 pg/mL     Folate [[id number]]  (Normal) Collected:  05/17/18 0049    Specimen:  Blood Updated:  05/17/18 0147     Folate 19.22 ng/mL     CBC & Differential [[id number]] Collected:  05/17/18 0058    Specimen:  Blood Updated:  05/17/18 0144    Narrative:       The following orders were created for panel order CBC & Differential.  Procedure                               Abnormality         Status                     ---------                               -----------         ------                     Scan Slide[[id number]]                                                                  CBC Auto Differential[[id number]]        Abnormal            Final result                 Please view results for these tests on the individual orders.    CBC Auto Differential [[id number]]  (Abnormal) Collected:  05/17/18 0058    Specimen:  Blood Updated:  05/17/18 0144     WBC 2.78 (L) 10*3/mm3      RBC 3.78 (L) 10*6/mm3      Hemoglobin 10.4 (L) g/dL      Hematocrit 32.7 (L) %      MCV 86.5 fL      MCH 27.5 pg      MCHC 31.8 (L) g/dL      RDW 15.9 (H) %      RDW-SD 50.4 fl      MPV -- fL      Comment: .        Platelets 8 (C) 10*3/mm3      Neutrophil % 61.9 %      Lymphocyte % 22.7 %      Monocyte % 11.5 %      Eosinophil % 3.2 %      Basophil % 0.7 %      Immature Grans % 0.4 %      Neutrophils, Absolute 1.72 (L) 10*3/mm3      Lymphocytes, Absolute 0.63 (L) 10*3/mm3      Monocytes, Absolute 0.32 10*3/mm3      Eosinophils, Absolute 0.09 10*3/mm3      Basophils, Absolute 0.02 10*3/mm3      Immature Grans, Absolute 0.01 10*3/mm3      nRBC 0.0 /100 WBC     Rheumatoid Factor [[id number]]  (Abnormal) Collected:  05/16/18 1943    Specimen:  Blood Updated:  05/17/18 0024     Rheumatoid Factor Quantitative 20.9 (H) IU/mL     Immature Platelet Fraction [[id number]]  (Abnormal) Collected:  05/16/18 2042     Specimen:  Blood Updated:  05/16/18 2332     IPF 18.40 (H) %      Platelets 3 (C) 10*3/mm3     Fibrinogen [[id number]]  (Normal) Collected:  05/16/18 1943    Specimen:  Blood Updated:  05/16/18 2325     Fibrinogen 357 mg/dL     CBC & Differential [[id number]] Collected:  05/16/18 2042    Specimen:  Blood Updated:  05/16/18 2140    Narrative:       The following orders were created for panel order CBC & Differential.  Procedure                               Abnormality         Status                     ---------                               -----------         ------                     Scan Slide[[id number]]                   Normal              Final result               CBC Auto Differential[[id number]]        Abnormal            Final result                 Please view results for these tests on the individual orders.    CBC Auto Differential [[id number]]  (Abnormal) Collected:  05/16/18 2042    Specimen:  Blood Updated:  05/16/18 2140     WBC 2.36 (L) 10*3/mm3      RBC 4.11 10*6/mm3      Hemoglobin 11.4 (L) g/dL      Hematocrit 35.6 %      MCV 86.6 fL      MCH 27.7 pg      MCHC 32.0 (L) g/dL      RDW 15.8 (H) %      RDW-SD 50.2 fl      MPV -- fL      Comment: .         Platelets 6 (C) 10*3/mm3      Neutrophil % 62.3 %      Lymphocyte % 23.3 %      Monocyte % 8.9 %      Eosinophil % 4.7 %      Basophil % 0.8 %      Immature Grans % 0.4 %      Neutrophils, Absolute 1.47 (L) 10*3/mm3      Lymphocytes, Absolute 0.55 (L) 10*3/mm3      Monocytes, Absolute 0.21 10*3/mm3      Eosinophils, Absolute 0.11 10*3/mm3      Basophils, Absolute 0.02 10*3/mm3      Immature Grans, Absolute 0.01 10*3/mm3      nRBC 0.0 /100 WBC     Scan Slide [[id number]]  (Normal) Collected:  05/16/18 2042    Specimen:  Blood Updated:  05/16/18 2140     RBC Morphology Normal     WBC Morphology Normal     Platelet Morphology Normal    Protime-INR [[id number]]  (Abnormal) Collected:  05/16/18 1943    Specimen:  Blood Updated:  05/16/18 2025     Protime  15.6 (H) Seconds      INR 1.26 (H)    aPTT [[id number]]  (Normal) Collected:  05/16/18 1943    Specimen:  Blood Updated:  05/16/18 2025     PTT 30.2 seconds     Comprehensive Metabolic Panel [[id number]]  (Abnormal) Collected:  05/16/18 1943    Specimen:  Blood Updated:  05/16/18 2022     Glucose 347 (H) mg/dL      BUN 6 mg/dL      Creatinine 0.77 mg/dL      Sodium 140 mmol/L      Potassium 4.3 mmol/L      Chloride 101 mmol/L      CO2 24.4 mmol/L      Calcium 8.7 mg/dL      Total Protein 6.8 g/dL      Albumin 3.40 (L) g/dL      ALT (SGPT) 23 U/L      AST (SGOT) 45 (H) U/L      Alkaline Phosphatase 125 (H) U/L      Total Bilirubin 0.8 mg/dL      eGFR Non African Amer 78 mL/min/1.73      Globulin 3.4 gm/dL      A/G Ratio 1.0 g/dL      BUN/Creatinine Ratio 7.8     Anion Gap 14.6 mmol/L         Imaging Results (last 72 hours)     Procedure Component Value Units Date/Time    CT Chest Without Contrast [[id number]] Collected:  05/17/18 1433     Updated:  05/17/18 1441    Narrative:       CT CHEST WO CONTRAST-     CLINICAL HISTORY: Dyspnea. Chest pain. Fever.     TECHNIQUE: Spiral CT images were obtained through the chest without IV  contrast and were reconstructed in 3 mm thick slices.     Radiation dose reduction techniques were utilized, including automated  exposure control and exposure modulation based on body size.     COMPARISON: Chest x-ray dated 3/30/2018.     FINDINGS: There is minimal pleural and parenchymal scarring at the left  lung apex. The lungs are otherwise clear. There are no infiltrates or  discrete lung masses. There is no mediastinal or hilar or axillary  lymphadenopathy. There are no pleural effusions. Images through the  upper abdomen show mild diffuse injection of the peripancreatic fat that  could indicate acute pancreatitis. Correlation with clinical findings is  recommended.       Impression:       Unremarkable CT scan of the chest except for equivocal  evidence of pancreatitis in the upper abdomen  as noted.     This report was finalized on 5/17/2018 2:38 PM by Dr. Marco Ramos M.D.             Medication Review:       Current Facility-Administered Medications:   •  acetaminophen (TYLENOL) tablet 325 mg, 325 mg, Oral, Daily, Gerson Olson II, MD  •  acetaminophen (TYLENOL) tablet 650 mg, 650 mg, Oral, Q4H PRN, Jose Almanza MD  •  acetaminophen (TYLENOL) tablet 650 mg, 650 mg, Oral, Daily, Gerson Olson II, MD, 650 mg at 05/19/18 0922  •  ALPRAZolam (XANAX) tablet 0.5 mg, 0.5 mg, Oral, BID PRN, Jose Almanza MD, 0.5 mg at 05/18/18 1829  •  buPROPion XL (WELLBUTRIN XL) 24 hr tablet 150 mg, 150 mg, Oral, Daily, Jose Almanza MD, 150 mg at 05/19/18 0922  •  clotrimazole (MYCELEX) omer 10 mg, 10 mg, Oral, 5x Daily, Jose Almanza MD, 10 mg at 05/19/18 1222  •  cyclobenzaprine (FLEXERIL) tablet 5 mg, 5 mg, Oral, TID PRN, Jose Almanza MD, 5 mg at 05/17/18 0150  •  cycloSPORINE (RESTASIS) 0.05 % ophthalmic emulsion 1 drop, 1 drop, Both Eyes, BID, Jose Almanza MD, 1 drop at 05/19/18 0922  •  dextrose (D50W) solution 25 g, 25 g, Intravenous, Q15 Min PRN, Jose Almanza MD  •  dextrose (GLUTOSE) oral gel 15 g, 15 g, Oral, Q15 Min PRN, Jose Almanza MD  •  diphenhydrAMINE (BENADRYL) capsule 25 mg, 25 mg, Oral, Daily, Gerson Olson II, MD, 25 mg at 05/18/18 0942  •  diphenhydrAMINE (BENADRYL) capsule 25 mg, 25 mg, Oral, Daily, Gerson Olson II, MD, 25 mg at 05/19/18 0921  •  DULoxetine (CYMBALTA) DR capsule 90 mg, 90 mg, Oral, Daily, Jose Almanza MD, 90 mg at 05/19/18 0922  •  ferrous sulfate tablet 325 mg, 325 mg, Oral, BID, Jose Almanza MD, 325 mg at 05/19/18 0922  •  folic acid (FOLVITE) tablet 1 mg, 1 mg, Oral, Daily, Jose Almanza MD, 1 mg at 05/19/18 0922  •  glucagon (human recombinant) (GLUCAGEN DIAGNOSTIC) injection 1 mg, 1 mg, Subcutaneous, PRN, Jose Almanza MD  •  hydrOXYzine (ATARAX) tablet 25 mg, 25 mg, Oral, TID PRN, Jose Almanza MD  •  immune  globulin (human) (GAMMAGARD) infusion 30 g, 400 mg/kg, Intravenous, Daily, Gerson Olson II, MD, 30 g at 05/19/18 0923  •  insulin aspart (novoLOG) injection 0-14 Units, 0-14 Units, Subcutaneous, 4x Daily With Meals & Nightly, Jose Almanza MD, 3 Units at 05/19/18 1222  •  insulin aspart (novoLOG) injection 24 Units, 24 Units, Subcutaneous, TID With Meals, Merary Burnett MD, 24 Units at 05/19/18 1223  •  insulin detemir (LEVEMIR) injection 40 Units, 40 Units, Subcutaneous, Nightly, Merary Burnett MD, 40 Units at 05/18/18 2154  •  insulin detemir (LEVEMIR) injection 60 Units, 60 Units, Subcutaneous, QAM, Merary Burnett MD, 60 Units at 05/19/18 0704  •  iron sucrose (VENOFER) 300 mg in sodium chloride 0.9 % 250 mL IVPB, 300 mg, Intravenous, Daily, Gerson Olson II, MD  •  levETIRAcetam (KEPPRA) tablet 500 mg, 500 mg, Oral, BID, Jose Almanza MD, 500 mg at 05/19/18 0922  •  oxyCODONE (ROXICODONE) immediate release tablet 5 mg, 5 mg, Oral, Q6H PRN, Jose Almanza MD, 5 mg at 05/18/18 1710  •  pantoprazole (PROTONIX) EC tablet 40 mg, 40 mg, Oral, Q AM, Wesly Walsh MD, 40 mg at 05/19/18 0704  •  polyethylene glycol 3350 powder (packet), 17 g, Oral, Daily, Wesly Walsh MD, 17 g at 05/19/18 1222  •  predniSONE (DELTASONE) tablet 75 mg, 75 mg, Oral, Daily With Breakfast, Gerson Olson II, MD, 75 mg at 05/19/18 0922  •  pregabalin (LYRICA) capsule 75 mg, 75 mg, Oral, BID, Jose Almanza MD, 75 mg at 05/19/18 0921  •  promethazine (PHENERGAN) tablet 25 mg, 25 mg, Oral, Q6H PRN, Jose Almanza MD, 25 mg at 05/18/18 1710  •  rifaximin (XIFAXAN) tablet 550 mg, 550 mg, Oral, BID, Jose Almanza MD, 550 mg at 05/19/18 0921  •  sodium chloride 0.9 % flush 1-10 mL, 1-10 mL, Intravenous, PRN, Jose Almanza MD, 10 mL at 05/18/18 1301  •  Insert peripheral IV, , , Once **AND** sodium chloride 0.9 % flush 10 mL, 10 mL, Intravenous, PRN, Mauro Moore MD  •  spironolactone (ALDACTONE)  tablet 100 mg, 100 mg, Oral, Daily, Jose Almanza MD, 100 mg at 05/19/18 0921  •  sucralfate (CARAFATE) tablet 1 g, 1 g, Oral, BID, Jose Almanza MD, 1 g at 05/19/18 0921  •  trimethobenzamide (TIGAN) capsule 300 mg, 300 mg, Oral, TID, Jose Almanza MD, 300 mg at 05/19/18 0922    Assessment/Plan     Patient Active Problem List   Diagnosis   • Cirrhosis of liver   • Rheumatoid arthritis   • Anxiety and depression   • Type 2 diabetes mellitus, uncontrolled, with neuropathy   • Fibrositis   • Change in blood platelet count   • Pancytopenia   • Hypersplenism   • Nausea   • DUKES (nonalcoholic steatohepatitis)   • Hyperlipidemia   • Generalized abdominal pain   • Hematemesis   • Ascites   • Portal hypertension   • Systemic lupus   • Secondary esophageal varices without bleeding   • Gastroparesis   • Cirrhosis of liver without ascites   • Diabetic ketoacidosis without coma associated with type 2 diabetes mellitus   • Diabetic peripheral neuropathy   • History of seizure disorder   • Hepatic encephalopathy   • Abnormal finding of blood chemistry    • Fever   • Acute ITP     Uncontrolled type 2 diabetes mellitus  Uncontrolled type 2 diabetes mellitus with peripheral neuropathy  Current use of steroids completing diabetes  Cirrhosis of the liver with portal hypertension    Patient's blood sugars are significantly elevated  I will increase the Levemir to 70 units in the morning and NovoLog to 30 units before each meal  Will continue to monitor the Accu-Cheks and then adjust insulin as needed  Patient verbalized understanding    The total time spent for old record and lab review and floor time was more than 35 min of which greater than 20 min of time  ( greater than 50% of the total time )  was spent face to face with the patient counseling and coordination of care owas spent on counseling the patient on recommended evaluation and treatment options, instructions for management/treatment and /or follow up  and  "importance of compliance with chosen management or treatment options    Lamin Lopez MD FACE.  05/19/18  4:38 PM      EMR Dragon / transcription disclaimer:     \"Dictated utilizing Dragon dictation\".   "

## 2018-05-19 NOTE — PROGRESS NOTES
" LOS: 3 days     Name: Silvia Zabala  Age: 57 y.o.  Sex: female  :  1960  MRN: [id number]         Primary Care Physician: Blanca Pitts MD    Subjective   Subjective  Feels fatigued but otherwise no complaints.  Denies hematemesis.  Has not had a bowel movement since admission.    Objective   Vital Signs  Temp:  [98.1 °F (36.7 °C)-98.9 °F (37.2 °C)] 98.1 °F (36.7 °C)  Heart Rate:  [] 101  Resp:  [18-20] 18  BP: (126-159)/(72-87) 126/72  Body mass index is 26.97 kg/m².    Objective:  General Appearance:  Comfortable and in no acute distress.    Vital signs: (most recent): Blood pressure 126/72, pulse 101, temperature 98.1 °F (36.7 °C), resp. rate 18, height 167.6 cm (66\"), weight 75.8 kg (167 lb 1.6 oz), SpO2 97 %.    Lungs:  Normal effort and normal respiratory rate.    Heart: Normal rate.  Regular rhythm.    Abdomen: Abdomen is soft.  Bowel sounds are normal.   There is no abdominal tenderness.     Extremities: There is no dependent edema or local swelling.    Neurological: Patient is alert and oriented to person, place and time.    Skin:  Warm and dry.              Results Review:       I reviewed the patient's new clinical results.      Results from last 7 days  Lab Units 188 188 18  2042   WBC 10*3/mm3 2.95* 2.01* 3.09* 2.78* 2.36*   HEMOGLOBIN g/dL 9.9* 9.6* 10.7* 10.4* 11.4*   PLATELETS 10*3/mm3 5* 4* 4* 8* 3*  6*       Results from last 7 days  Lab Units 18  0450 18  0425 18  0358 18  1943   SODIUM mmol/L 138 136 139 140   POTASSIUM mmol/L 3.7 4.4 4.1 4.3   CHLORIDE mmol/L 102 100 102 101   CO2 mmol/L 24.3 23.6 24.7 24.4   BUN mg/dL 16 13 7 6   CREATININE mg/dL 0.69 0.70 0.72 0.77   CALCIUM mg/dL 9.1 8.8 9.0 8.7   GLUCOSE mg/dL 111* 323* 207* 347*       Results from last 7 days  Lab Units 18   INR  1.26*       Scheduled Meds:     acetaminophen 325 mg Oral Daily   acetaminophen 650 mg Oral Daily "   buPROPion  mg Oral Daily   clotrimazole 10 mg Oral 5x Daily   cycloSPORINE 1 drop Both Eyes BID   diphenhydrAMINE 25 mg Oral Daily   diphenhydrAMINE 25 mg Oral Daily   DULoxetine 90 mg Oral Daily   ferrous sulfate 325 mg Oral BID   folic acid 1 mg Oral Daily   immune globulin (human) 400 mg/kg Intravenous Daily   insulin aspart 0-14 Units Subcutaneous 4x Daily With Meals & Nightly   insulin aspart 24 Units Subcutaneous TID With Meals   insulin detemir 40 Units Subcutaneous Nightly   insulin detemir 60 Units Subcutaneous QAM   iron sucrose 300 mg Intravenous Daily   levETIRAcetam 500 mg Oral BID   pantoprazole 40 mg Oral Q AM   predniSONE 75 mg Oral Daily With Breakfast   pregabalin 75 mg Oral BID   rifaximin 550 mg Oral BID   spironolactone 100 mg Oral Daily   sucralfate 1 g Oral BID   trimethobenzamide 300 mg Oral TID     PRN Meds:   acetaminophen  •  ALPRAZolam  •  cyclobenzaprine  •  dextrose  •  dextrose  •  glucagon (human recombinant)  •  hydrOXYzine  •  oxyCODONE  •  promethazine  •  sodium chloride  •  Insert peripheral IV **AND** sodium chloride  Continuous Infusions:       Assessment/Plan   Principal Problem:    Acute ITP  Active Problems:    Cirrhosis of liver    Rheumatoid arthritis    Anxiety and depression    Type 2 diabetes mellitus, uncontrolled, with neuropathy    DUKES (nonalcoholic steatohepatitis)    Hematemesis    Systemic lupus    Secondary esophageal varices without bleeding    Fever      Assessment & Plan    - She will continue prednisone and IVIG for treatment of her ITP, day 3.  Hematology managing.  No active bleeding at present.  - insulin adjustments per endocrinology.  Glucose better controlled today  - No evidence of infectious process.  CT scan of the chest unremarkable.  Intermittent fevers most likely due to her hematologic or underlying rheumatologic issues.  Hepatitis and fungitel studies are pending  - No indication for endoscopy per gastroenterology.        Wesly Garay  MD N[facility]  [facility]  05/19/18  9:41 AM

## 2018-05-19 NOTE — PROGRESS NOTES
[facility]  Inpatient Progress Note    Reason for Follow Up:  cirrhosis    Subjective     Interval History:   She c/o abd discomfort - chronic.  NO BM - chronically constipated.  Not eating much.  No bleeding    Current Facility-Administered Medications:   •  acetaminophen (TYLENOL) tablet 325 mg, 325 mg, Oral, Daily, Gerson Olson II, MD  •  acetaminophen (TYLENOL) tablet 650 mg, 650 mg, Oral, Q4H PRN, Jose Almanza MD  •  acetaminophen (TYLENOL) tablet 650 mg, 650 mg, Oral, Daily, Gerson Olson II, MD, 650 mg at 05/19/18 0922  •  ALPRAZolam (XANAX) tablet 0.5 mg, 0.5 mg, Oral, BID PRN, Jose Almanza MD, 0.5 mg at 05/18/18 1829  •  buPROPion XL (WELLBUTRIN XL) 24 hr tablet 150 mg, 150 mg, Oral, Daily, Jose Almanza MD, 150 mg at 05/19/18 0922  •  clotrimazole (MYCELEX) omer 10 mg, 10 mg, Oral, 5x Daily, Jose Almanza MD, 10 mg at 05/19/18 1222  •  cyclobenzaprine (FLEXERIL) tablet 5 mg, 5 mg, Oral, TID PRN, Jose Almanza MD, 5 mg at 05/17/18 0150  •  cycloSPORINE (RESTASIS) 0.05 % ophthalmic emulsion 1 drop, 1 drop, Both Eyes, BID, Jose Almanza MD, 1 drop at 05/19/18 0922  •  dextrose (D50W) solution 25 g, 25 g, Intravenous, Q15 Min PRN, Jose Almanza MD  •  dextrose (GLUTOSE) oral gel 15 g, 15 g, Oral, Q15 Min PRN, Jose Almanza MD  •  diphenhydrAMINE (BENADRYL) capsule 25 mg, 25 mg, Oral, Daily, Gerson Olson II, MD, 25 mg at 05/18/18 0942  •  diphenhydrAMINE (BENADRYL) capsule 25 mg, 25 mg, Oral, Daily, Gerson Olson II, MD, 25 mg at 05/19/18 0921  •  DULoxetine (CYMBALTA) DR capsule 90 mg, 90 mg, Oral, Daily, Jose Almanza MD, 90 mg at 05/19/18 0922  •  ferrous sulfate tablet 325 mg, 325 mg, Oral, BID, Jose Almanza MD, 325 mg at 05/19/18 0922  •  folic acid (FOLVITE) tablet 1 mg, 1 mg, Oral, Daily, Jose Almanza MD, 1 mg at 05/19/18 0922  •  glucagon (human recombinant) (GLUCAGEN DIAGNOSTIC) injection 1 mg, 1 mg, Subcutaneous, PRN, Joes  KATARZYNA Almanza MD  •  hydrOXYzine (ATARAX) tablet 25 mg, 25 mg, Oral, TID PRN, Jose Almanza MD  •  immune globulin (human) (GAMMAGARD) infusion 30 g, 400 mg/kg, Intravenous, Daily, Gerson Olson II, MD, 30 g at 05/19/18 0923  •  insulin aspart (novoLOG) injection 0-14 Units, 0-14 Units, Subcutaneous, 4x Daily With Meals & Nightly, Jose Almanza MD, 3 Units at 05/19/18 1222  •  insulin aspart (novoLOG) injection 24 Units, 24 Units, Subcutaneous, TID With Meals, Merary Burnett MD, 24 Units at 05/19/18 1223  •  insulin detemir (LEVEMIR) injection 40 Units, 40 Units, Subcutaneous, Nightly, Merary Burnett MD, 40 Units at 05/18/18 2154  •  insulin detemir (LEVEMIR) injection 60 Units, 60 Units, Subcutaneous, QAM, Merary Burnett MD, 60 Units at 05/19/18 0704  •  iron sucrose (VENOFER) 300 mg in sodium chloride 0.9 % 250 mL IVPB, 300 mg, Intravenous, Daily, Gerson Olson II, MD  •  levETIRAcetam (KEPPRA) tablet 500 mg, 500 mg, Oral, BID, Jose Almanza MD, 500 mg at 05/19/18 0922  •  oxyCODONE (ROXICODONE) immediate release tablet 5 mg, 5 mg, Oral, Q6H PRN, Jose Almanza MD, 5 mg at 05/18/18 1710  •  pantoprazole (PROTONIX) EC tablet 40 mg, 40 mg, Oral, Q AM, Wesly Walsh MD, 40 mg at 05/19/18 0704  •  polyethylene glycol 3350 powder (packet), 17 g, Oral, Daily, Wesly Walsh MD, 17 g at 05/19/18 1222  •  predniSONE (DELTASONE) tablet 75 mg, 75 mg, Oral, Daily With Breakfast, Gerson Olson II, MD, 75 mg at 05/19/18 0922  •  pregabalin (LYRICA) capsule 75 mg, 75 mg, Oral, BID, Jose Almanza MD, 75 mg at 05/19/18 0921  •  promethazine (PHENERGAN) tablet 25 mg, 25 mg, Oral, Q6H PRN, Jose Almanza MD, 25 mg at 05/18/18 1710  •  rifaximin (XIFAXAN) tablet 550 mg, 550 mg, Oral, BID, Jose Almanza MD, 550 mg at 05/19/18 0921  •  sodium chloride 0.9 % flush 1-10 mL, 1-10 mL, Intravenous, PRN, Jose Almanza MD, 10 mL at 05/18/18 2556  •  Insert peripheral IV, , , Once **AND**  sodium chloride 0.9 % flush 10 mL, 10 mL, Intravenous, PRN, Mauro Moore MD  •  spironolactone (ALDACTONE) tablet 100 mg, 100 mg, Oral, Daily, Jose Almanza MD, 100 mg at 05/19/18 0921  •  sucralfate (CARAFATE) tablet 1 g, 1 g, Oral, BID, Jose Almanza MD, 1 g at 05/19/18 0921  •  trimethobenzamide (TIGAN) capsule 300 mg, 300 mg, Oral, TID, Jose Alamnza MD, 300 mg at 05/19/18 0922  Review of Systems:    All systems were reviewed and negative except for:  Gastrointestinal: postitive for  constipation and pain    Objective     Vital Signs  Temp:  [97.8 °F (36.6 °C)-98.9 °F (37.2 °C)] 97.8 °F (36.6 °C)  Heart Rate:  [] 104  Resp:  [18-20] 18  BP: (114-159)/(63-87) 139/86  Body mass index is 26.97 kg/m².    Intake/Output Summary (Last 24 hours) at 05/19/18 1337  Last data filed at 05/19/18 1313   Gross per 24 hour   Intake             1180 ml   Output                0 ml   Net             1180 ml     I/O this shift:  In: 720 [P.O.:720]  Out: -      Physical Exam:   General: patient awake, alert and cooperative   Eyes: Normal lids and lashes, no scleral icterus   Neck: supple, normal ROM   Skin: warm and dry, not jaundiced   Abdomen: soft, mildly tender, distended; normal bowel sounds   Rectal: deferred   Extremities: no rash or edema   Psychiatric: Normal mood and behavior; memory intact     Results Review:     I reviewed the patient's new clinical results.      Results from last 7 days  Lab Units 05/19/18  0450 05/18/18 0425 05/17/18  0358   WBC 10*3/mm3 2.95* 2.01* 3.09*   HEMOGLOBIN g/dL 9.9* 9.6* 10.7*   HEMATOCRIT % 30.5* 29.9* 33.4*   PLATELETS 10*3/mm3 5* 4* 4*       Results from last 7 days  Lab Units 05/19/18  0450 05/18/18  0425 05/17/18  0358 05/16/18  1943   SODIUM mmol/L 138 136 139 140   POTASSIUM mmol/L 3.7 4.4 4.1 4.3   CHLORIDE mmol/L 102 100 102 101   CO2 mmol/L 24.3 23.6 24.7 24.4   BUN mg/dL 16 13 7 6   CREATININE mg/dL 0.69 0.70 0.72 0.77   CALCIUM mg/dL 9.1 8.8 9.0 8.7    BILIRUBIN mg/dL  --   --   --  0.8   ALK PHOS U/L  --   --   --  125*   ALT (SGPT) U/L  --   --   --  23   AST (SGOT) U/L  --   --   --  45*   GLUCOSE mg/dL 111* 323* 207* 347*       Results from last 7 days  Lab Units 05/16/18  1943   INR  1.26*       Lab Results  Lab Value Date/Time   LIPASE 9 (L) 05/18/2018 0425   LIPASE 22 12/16/2017 1650   LIPASE 10 (L) 10/30/2017 1732   LIPASE 9 (L) 02/21/2017 1202   LIPASE 16 11/10/2014 0724       Radiology:  CT Chest Without Contrast   Final Result   Unremarkable CT scan of the chest except for equivocal   evidence of pancreatitis in the upper abdomen as noted.       This report was finalized on 5/17/2018 2:38 PM by Dr. Marco Ramso M.D.              Assessment/Plan     Patient Active Problem List   Diagnosis   • Cirrhosis of liver   • Rheumatoid arthritis   • Anxiety and depression   • Type 2 diabetes mellitus, uncontrolled, with neuropathy   • Fibrositis   • Change in blood platelet count   • Pancytopenia   • Hypersplenism   • Nausea   • DUKES (nonalcoholic steatohepatitis)   • Hyperlipidemia   • Generalized abdominal pain   • Hematemesis   • Ascites   • Portal hypertension   • Systemic lupus   • Secondary esophageal varices without bleeding   • Gastroparesis   • Cirrhosis of liver without ascites   • Diabetic ketoacidosis without coma associated with type 2 diabetes mellitus   • Diabetic peripheral neuropathy   • History of seizure disorder   • Hepatic encephalopathy   • Abnormal finding of blood chemistry    • Fever   • Acute ITP     Assessment:  1. DUKES cirrhosis  2. Acute ITP  3. Hx esophageal varices    Plan:  - Hb stable thankfully  - If she exhibits evidence of GI bleeding, recommend octreotide gtt - will not be amenable to endoscopic therapy given severity of thrombocytopenia  - will continue to follow closely    I discussed the patients findings and my recommendations with patient.    Kimberly Gray MD

## 2018-05-19 NOTE — PLAN OF CARE
Problem: Patient Care Overview  Goal: Plan of Care Review  Outcome: Ongoing (interventions implemented as appropriate)   05/18/18 2154 05/19/18 0625   Coping/Psychosocial   Plan of Care Reviewed With patient --    Plan of Care Review   Progress --  no change   OTHER   Outcome Summary --  Pt is tolerating IVIG; however platelets remain low....5 this morning. HR remained SR-ST () this shift. Pt states she feels better after Xanax. Pt up ad alicja. Will continue to monitor safety.       Problem: Fall Risk (Adult)  Goal: Absence of Fall  Outcome: Ongoing (interventions implemented as appropriate)      Problem: Liver Failure, Acute/Chronic (Adult)  Goal: Signs and Symptoms of Listed Potential Problems Will be Absent, Minimized or Managed (Liver Failure, Acute/Chronic)  Outcome: Outcome(s) achieved Date Met: 05/19/18

## 2018-05-19 NOTE — PLAN OF CARE
Problem: Patient Care Overview  Goal: Plan of Care Review  Outcome: Ongoing (interventions implemented as appropriate)   05/19/18 7027   Coping/Psychosocial   Plan of Care Reviewed With patient;spouse   OTHER   Outcome Summary Pt cont on IVIG; no active signs/sx bleeding; Pt started on IV iron daily; transfused; Pt having some moderate bowel distention/gas discomfort; laxative given, able to have a bowl movement tonight; cont to monitor risk for bleeding       Problem: Fall Risk (Adult)  Goal: Absence of Fall  Outcome: Ongoing (interventions implemented as appropriate)      Problem: Liver Failure, Acute/Chronic (Adult)  Goal: Signs and Symptoms of Listed Potential Problems Will be Absent, Minimized or Managed (Liver Failure, Acute/Chronic)  Outcome: Ongoing (interventions implemented as appropriate)

## 2018-05-20 LAB
ANION GAP SERPL CALCULATED.3IONS-SCNC: 11.9 MMOL/L
BASOPHILS # BLD AUTO: 0.02 10*3/MM3 (ref 0–0.2)
BASOPHILS NFR BLD AUTO: 0.5 % (ref 0–1.5)
BUN BLD-MCNC: 15 MG/DL (ref 6–20)
BUN/CREAT SERPL: 18.3 (ref 7–25)
CALCIUM SPEC-SCNC: 9.1 MG/DL (ref 8.6–10.5)
CHLORIDE SERPL-SCNC: 101 MMOL/L (ref 98–107)
CO2 SERPL-SCNC: 24.1 MMOL/L (ref 22–29)
CREAT BLD-MCNC: 0.82 MG/DL (ref 0.57–1)
DEPRECATED RDW RBC AUTO: 49.1 FL (ref 37–54)
EOSINOPHIL # BLD AUTO: 0.02 10*3/MM3 (ref 0–0.7)
EOSINOPHIL NFR BLD AUTO: 0.5 % (ref 0.3–6.2)
ERYTHROCYTE [DISTWIDTH] IN BLOOD BY AUTOMATED COUNT: 15.7 % (ref 11.7–13)
GFR SERPL CREATININE-BSD FRML MDRD: 72 ML/MIN/1.73
GLUCOSE BLD-MCNC: 53 MG/DL (ref 65–99)
GLUCOSE BLDC GLUCOMTR-MCNC: 127 MG/DL (ref 70–130)
GLUCOSE BLDC GLUCOMTR-MCNC: 210 MG/DL (ref 70–130)
GLUCOSE BLDC GLUCOMTR-MCNC: 271 MG/DL (ref 70–130)
GLUCOSE BLDC GLUCOMTR-MCNC: 296 MG/DL (ref 70–130)
GLUCOSE BLDC GLUCOMTR-MCNC: 314 MG/DL (ref 70–130)
GLUCOSE BLDC GLUCOMTR-MCNC: 358 MG/DL (ref 70–130)
GLUCOSE BLDC GLUCOMTR-MCNC: 390 MG/DL (ref 70–130)
GLUCOSE BLDC GLUCOMTR-MCNC: 56 MG/DL (ref 70–130)
HCT VFR BLD AUTO: 32.9 % (ref 35.6–45.5)
HGB BLD-MCNC: 10.6 G/DL (ref 11.9–15.5)
IMM GRANULOCYTES # BLD: 0.09 10*3/MM3 (ref 0–0.03)
IMM GRANULOCYTES NFR BLD: 2.1 % (ref 0–0.5)
LYMPHOCYTES # BLD AUTO: 1.53 10*3/MM3 (ref 0.9–4.8)
LYMPHOCYTES NFR BLD AUTO: 35.7 % (ref 19.6–45.3)
MCH RBC QN AUTO: 27.6 PG (ref 26.9–32)
MCHC RBC AUTO-ENTMCNC: 32.2 G/DL (ref 32.4–36.3)
MCV RBC AUTO: 85.7 FL (ref 80.5–98.2)
MONOCYTES # BLD AUTO: 0.38 10*3/MM3 (ref 0.2–1.2)
MONOCYTES NFR BLD AUTO: 8.9 % (ref 5–12)
NEUTROPHILS # BLD AUTO: 2.34 10*3/MM3 (ref 1.9–8.1)
NEUTROPHILS NFR BLD AUTO: 54.4 % (ref 42.7–76)
NRBC BLD MANUAL-RTO: 0 /100 WBC (ref 0–0)
PLATELET # BLD AUTO: 15 10*3/MM3 (ref 140–500)
PMV BLD AUTO: ABNORMAL FL (ref 6–12)
POTASSIUM BLD-SCNC: 3.4 MMOL/L (ref 3.5–5.2)
RBC # BLD AUTO: 3.84 10*6/MM3 (ref 3.9–5.2)
SODIUM BLD-SCNC: 137 MMOL/L (ref 136–145)
WBC NRBC COR # BLD: 4.29 10*3/MM3 (ref 4.5–10.7)

## 2018-05-20 PROCEDURE — 63710000001 PREDNISONE PER 5 MG: Performed by: INTERNAL MEDICINE

## 2018-05-20 PROCEDURE — 99231 SBSQ HOSP IP/OBS SF/LOW 25: CPT | Performed by: INTERNAL MEDICINE

## 2018-05-20 PROCEDURE — 80048 BASIC METABOLIC PNL TOTAL CA: CPT | Performed by: HOSPITALIST

## 2018-05-20 PROCEDURE — 25010000002 IRON SUCROSE PER 1 MG: Performed by: INTERNAL MEDICINE

## 2018-05-20 PROCEDURE — 85025 COMPLETE CBC W/AUTO DIFF WBC: CPT | Performed by: INTERNAL MEDICINE

## 2018-05-20 PROCEDURE — 99233 SBSQ HOSP IP/OBS HIGH 50: CPT | Performed by: INTERNAL MEDICINE

## 2018-05-20 PROCEDURE — 63710000001 PROMETHAZINE PER 25 MG: Performed by: HOSPITALIST

## 2018-05-20 PROCEDURE — 63710000001 INSULIN ASPART PER 5 UNITS: Performed by: HOSPITALIST

## 2018-05-20 PROCEDURE — 25010000002 IMMUNE GLOBULIN (HUMAN) 30 GM/300ML SOLUTION: Performed by: INTERNAL MEDICINE

## 2018-05-20 PROCEDURE — 63710000001 INSULIN ASPART PER 5 UNITS: Performed by: INTERNAL MEDICINE

## 2018-05-20 PROCEDURE — 63710000001 DIPHENHYDRAMINE PER 50 MG: Performed by: INTERNAL MEDICINE

## 2018-05-20 PROCEDURE — 82962 GLUCOSE BLOOD TEST: CPT

## 2018-05-20 PROCEDURE — 63710000001 PREDNISONE PER 1 MG: Performed by: INTERNAL MEDICINE

## 2018-05-20 PROCEDURE — 63710000001 INSULIN DETEMER PER 5 UNITS: Performed by: INTERNAL MEDICINE

## 2018-05-20 RX ORDER — POTASSIUM CHLORIDE 750 MG/1
20 CAPSULE, EXTENDED RELEASE ORAL ONCE
Status: COMPLETED | OUTPATIENT
Start: 2018-05-20 | End: 2018-05-21

## 2018-05-20 RX ADMIN — SUCRALFATE 1 G: 1 TABLET ORAL at 09:24

## 2018-05-20 RX ADMIN — BUPROPION HYDROCHLORIDE 150 MG: 150 TABLET, EXTENDED RELEASE ORAL at 09:23

## 2018-05-20 RX ADMIN — TRIMETHOBENZAMIDE HYDROCHLORIDE 300 MG: 300 CAPSULE ORAL at 09:23

## 2018-05-20 RX ADMIN — PREDNISONE 75 MG: 5 TABLET ORAL at 09:24

## 2018-05-20 RX ADMIN — CLOTRIMAZOLE 10 MG: 10 LOZENGE ORAL at 06:04

## 2018-05-20 RX ADMIN — CYCLOSPORINE 1 DROP: 0.5 EMULSION OPHTHALMIC at 09:22

## 2018-05-20 RX ADMIN — PREGABALIN 75 MG: 75 CAPSULE ORAL at 21:19

## 2018-05-20 RX ADMIN — PREGABALIN 75 MG: 75 CAPSULE ORAL at 09:23

## 2018-05-20 RX ADMIN — RIFAXIMIN 550 MG: 550 TABLET ORAL at 09:24

## 2018-05-20 RX ADMIN — CYCLOSPORINE 1 DROP: 0.5 EMULSION OPHTHALMIC at 21:19

## 2018-05-20 RX ADMIN — INSULIN ASPART 10 UNITS: 100 INJECTION, SOLUTION INTRAVENOUS; SUBCUTANEOUS at 09:56

## 2018-05-20 RX ADMIN — ALPRAZOLAM 0.5 MG: 0.5 TABLET ORAL at 22:51

## 2018-05-20 RX ADMIN — INSULIN ASPART 12 UNITS: 100 INJECTION, SOLUTION INTRAVENOUS; SUBCUTANEOUS at 21:19

## 2018-05-20 RX ADMIN — DIPHENHYDRAMINE HYDROCHLORIDE 25 MG: 25 CAPSULE ORAL at 14:44

## 2018-05-20 RX ADMIN — DIPHENHYDRAMINE HYDROCHLORIDE 25 MG: 25 CAPSULE ORAL at 14:30

## 2018-05-20 RX ADMIN — CLOTRIMAZOLE 10 MG: 10 LOZENGE ORAL at 17:06

## 2018-05-20 RX ADMIN — CLOTRIMAZOLE 10 MG: 10 LOZENGE ORAL at 14:44

## 2018-05-20 RX ADMIN — DULOXETINE HYDROCHLORIDE 90 MG: 60 CAPSULE, DELAYED RELEASE ORAL at 09:23

## 2018-05-20 RX ADMIN — INSULIN ASPART 30 UNITS: 100 INJECTION, SOLUTION INTRAVENOUS; SUBCUTANEOUS at 09:57

## 2018-05-20 RX ADMIN — FOLIC ACID 1 MG: 1 TABLET ORAL at 09:24

## 2018-05-20 RX ADMIN — PROMETHAZINE HYDROCHLORIDE 25 MG: 25 TABLET ORAL at 17:07

## 2018-05-20 RX ADMIN — LEVETIRACETAM 500 MG: 500 TABLET, FILM COATED ORAL at 21:19

## 2018-05-20 RX ADMIN — FERROUS SULFATE TAB 325 MG (65 MG ELEMENTAL FE) 325 MG: 325 (65 FE) TAB at 21:19

## 2018-05-20 RX ADMIN — FERROUS SULFATE TAB 325 MG (65 MG ELEMENTAL FE) 325 MG: 325 (65 FE) TAB at 09:23

## 2018-05-20 RX ADMIN — RIFAXIMIN 550 MG: 550 TABLET ORAL at 21:19

## 2018-05-20 RX ADMIN — SPIRONOLACTONE 100 MG: 100 TABLET ORAL at 09:23

## 2018-05-20 RX ADMIN — TRIMETHOBENZAMIDE HYDROCHLORIDE 300 MG: 300 CAPSULE ORAL at 21:19

## 2018-05-20 RX ADMIN — OXYCODONE HYDROCHLORIDE 5 MG: 5 TABLET ORAL at 15:38

## 2018-05-20 RX ADMIN — ACETAMINOPHEN 650 MG: 325 TABLET, FILM COATED ORAL at 09:23

## 2018-05-20 RX ADMIN — LEVETIRACETAM 500 MG: 500 TABLET, FILM COATED ORAL at 09:24

## 2018-05-20 RX ADMIN — IMMUNE GLOBULIN INFUSION (HUMAN) 30 G: 100 INJECTION, SOLUTION INTRAVENOUS; SUBCUTANEOUS at 09:57

## 2018-05-20 RX ADMIN — DEXTROSE MONOHYDRATE 25 G: 25 INJECTION, SOLUTION INTRAVENOUS at 05:13

## 2018-05-20 RX ADMIN — SUCRALFATE 1 G: 1 TABLET ORAL at 21:19

## 2018-05-20 RX ADMIN — IRON SUCROSE 300 MG: 20 INJECTION, SOLUTION INTRAVENOUS at 15:38

## 2018-05-20 RX ADMIN — CLOTRIMAZOLE 10 MG: 10 LOZENGE ORAL at 21:19

## 2018-05-20 RX ADMIN — PANTOPRAZOLE SODIUM 40 MG: 40 TABLET, DELAYED RELEASE ORAL at 06:04

## 2018-05-20 RX ADMIN — CLOTRIMAZOLE 10 MG: 10 LOZENGE ORAL at 12:12

## 2018-05-20 RX ADMIN — TRIMETHOBENZAMIDE HYDROCHLORIDE 300 MG: 300 CAPSULE ORAL at 15:38

## 2018-05-20 RX ADMIN — INSULIN DETEMIR 70 UNITS: 100 INJECTION, SOLUTION SUBCUTANEOUS at 09:57

## 2018-05-20 NOTE — PLAN OF CARE
Problem: Patient Care Overview  Goal: Plan of Care Review  Outcome: Ongoing (interventions implemented as appropriate)   05/19/18 2135 05/20/18 0633   Coping/Psychosocial   Plan of Care Reviewed With patient --    OTHER   Outcome Summary --  Pt had 3rd day of IVIG w/o complications. IV Venofer also given w/o problem. BS continue to be uncontrolled. BS from 7p-7a were 366, 296, 56, 271 & 314. Pt was symptomatic with the 56 BS calling out to staff for assist. Pt was treated per protocol. BM x 2, no s/s bleeding. Abdominal distention better. Safety maintained, will continue to monitor.        Problem: Fall Risk (Adult)  Goal: Absence of Fall  Outcome: Ongoing (interventions implemented as appropriate)      Problem: Liver Failure, Acute/Chronic (Adult)  Goal: Signs and Symptoms of Listed Potential Problems Will be Absent, Minimized or Managed (Liver Failure, Acute/Chronic)  Outcome: Outcome(s) achieved Date Met: 05/20/18

## 2018-05-20 NOTE — PROGRESS NOTES
[facility]     LOS: 4 days     Subjective:  Chest discomfort on the left side last night lasting about 30 minutes and is better    No cp currently, no soa    Objective:    Vital Signs:  Temp:  [97.8 °F (36.6 °C)-98.6 °F (37 °C)] 98.6 °F (37 °C)  Heart Rate:  [] 108  Resp:  [16-18] 16  BP: (125-161)/(76-97) 142/81    SpO2:  [97 %] 97 %  on   ;   Device (Oxygen Therapy): room air  Body mass index is 26.97 kg/m².    Physical Exam   Constitutional: She appears well-developed and well-nourished.   HENT:   Head: Normocephalic and atraumatic.   Cardiovascular: Normal rate and regular rhythm.    No murmur heard.  Pulmonary/Chest: Effort normal and breath sounds normal.   Abdominal: Soft. Bowel sounds are normal. She exhibits no distension. There is no tenderness.   Neurological: She is alert.   Skin: Skin is warm and dry.   Bruising in the antecubital fossas        Results Review:    Glucose   Date Value Ref Range Status   05/20/2018 53 (L) 65 - 99 mg/dL Final   05/19/2018 111 (H) 65 - 99 mg/dL Final   05/18/2018 323 (H) 65 - 99 mg/dL Final       Results from last 7 days  Lab Units 05/20/18  0457   WBC 10*3/mm3 4.29*   HEMOGLOBIN g/dL 10.6*   HEMATOCRIT % 32.9*   PLATELETS 10*3/mm3 15*       Results from last 7 days  Lab Units 05/20/18 0457  05/16/18  1943   SODIUM mmol/L 137  < > 140   POTASSIUM mmol/L 3.4*  < > 4.3   CHLORIDE mmol/L 101  < > 101   CO2 mmol/L 24.1  < > 24.4   BUN mg/dL 15  < > 6   CREATININE mg/dL 0.82  < > 0.77   CALCIUM mg/dL 9.1  < > 8.7   BILIRUBIN mg/dL  --   --  0.8   ALK PHOS U/L  --   --  125*   ALT (SGPT) U/L  --   --  23   AST (SGOT) U/L  --   --  45*   GLUCOSE mg/dL 53*  < > 347*   < > = values in this interval not displayed.    Results from last 7 days  Lab Units 05/16/18  1943   INR  1.26*   APTT seconds 30.2           Results from last 7 days  Lab Units 05/17/18  0049   TROPONIN T ng/mL <0.010     Cultures:  Blood Culture   Date Value Ref Range Status    05/17/2018 No growth at 3 days  Preliminary   05/17/2018 No growth at 3 days  Preliminary       I have reviewed daily medications and changes in CPOE    Scheduled meds    acetaminophen 325 mg Oral Daily   acetaminophen 650 mg Oral Daily   buPROPion  mg Oral Daily   clotrimazole 10 mg Oral 5x Daily   cycloSPORINE 1 drop Both Eyes BID   diphenhydrAMINE 25 mg Oral Daily   DULoxetine 90 mg Oral Daily   ferrous sulfate 325 mg Oral BID   folic acid 1 mg Oral Daily   immune globulin (human) 400 mg/kg Intravenous Daily   insulin aspart 0-14 Units Subcutaneous 4x Daily With Meals & Nightly   insulin aspart 30 Units Subcutaneous TID With Meals   insulin detemir 25 Units Subcutaneous Nightly   insulin detemir 70 Units Subcutaneous QAM   iron sucrose 300 mg Intravenous Daily   levETIRAcetam 500 mg Oral BID   Methylnaltrexone Bromide 150 mg Oral Daily   pantoprazole 40 mg Oral Q AM   polyethylene glycol 17 g Oral Daily   predniSONE 75 mg Oral Daily With Breakfast   pregabalin 75 mg Oral BID   rifaximin 550 mg Oral BID   spironolactone 100 mg Oral Daily   sucralfate 1 g Oral BID   trimethobenzamide 300 mg Oral TID          PRN meds  acetaminophen  •  ALPRAZolam  •  cyclobenzaprine  •  dextrose  •  dextrose  •  glucagon (human recombinant)  •  hydrOXYzine  •  oxyCODONE  •  promethazine  •  sodium chloride  •  Insert peripheral IV **AND** sodium chloride      Principal Problem:    Acute ITP  Active Problems:    Cirrhosis of liver    Rheumatoid arthritis    Anxiety and depression    Type 2 diabetes mellitus, uncontrolled, with neuropathy    DUKES (nonalcoholic steatohepatitis)    Hematemesis    Systemic lupus    Secondary esophageal varices without bleeding    Fever        Assessment/Plan:      Acute ITP-plts better, IVIG, prednisone 75      Cirrhosis of liver      Rheumatoid arthritis      Anxiety and depression      Type 2 diabetes mellitus, uncontrolled, with neuropathy  -low this am  -endocrinology is following      DUKES  (nonalcoholic steatohepatitis)      Hematemesis- none since admission      Systemic lupus      Secondary esophageal varices without bleeding      Fever-none overnight      Jose Almanza MD  05/20/18  11:37 AM

## 2018-05-20 NOTE — PROGRESS NOTES
57 y.o.   LOS: 4 days   Patient Care Team:  Blanca Pitts MD as PCP - General (Internal Medicine)  Sukhdeep Mcdowell MD as Consulting Physician (Hematology and Oncology)  Blanca Pitts MD as Referring Physician (Internal Medicine)    Chief Complaint:  Uncontrolled type 2 diabetes mellitus    Chief Complaint   Patient presents with   • Dizziness   • Nose Bleed       Subjective     HPI  Patient's fasting blood sugar is in the 60 range this morning  She reported mild symptoms  She has been eating reasonably well    Interval History:    Patient is a 57-year-old white female with a history of uncontrolled type 2 diabetes mellitus with insulin resistance and is currently on Humulin U-500 insulin at home  She is currently in the hospital using Levemir and NovoLog  She is getting Levemir twice daily and NovoLog 2 times daily before each meal  Her blood sugars are still elevated at 7 patient is currently on steroids which is complicating diabetes management     Review of Systems:      Review of Systems   Constitutional: Negative for activity change.   HENT: Negative for nosebleeds and trouble swallowing.    Respiratory: Negative for shortness of breath and wheezing.    Cardiovascular: Negative for chest pain and palpitations.   Gastrointestinal: Negative for constipation, diarrhea and nausea.   Genitourinary: Negative for dysuria and hematuria.   Musculoskeletal: Negative for arthralgias and myalgias.   Skin: Negative for rash and wound.   Neurological: Negative for seizures and syncope.   Hematological: Negative for adenopathy. Bruises/bleeds easily.   Psychiatric/Behavioral: Negative for confusion.   All other systems reviewed and are negative.    Objective     Vital Signs   Temp:  [97.8 °F (36.6 °C)-98.6 °F (37 °C)] 98.6 °F (37 °C)  Heart Rate:  [] 108  Resp:  [16-18] 16  BP: (125-161)/(76-97) 142/81    Physical Exam:  Physical Exam   Constitutional: She is oriented to person, place, and time.   Eyes: EOM are normal.  Pupils are equal, round, and reactive to light.   Cardiovascular: Normal rate, regular rhythm, normal heart sounds and intact distal pulses.    Pulmonary/Chest: Effort normal and breath sounds normal.   Abdominal: Soft. Bowel sounds are normal.   Musculoskeletal: Normal range of motion. She exhibits no edema.   Neurological: She is alert and oriented to person, place, and time.   Skin: Skin is warm and dry.   Psychiatric: She has a normal mood and affect. Her behavior is normal.   Nursing note and vitals reviewed.  Results Review:     I reviewed the patient's new clinical results.      Glucose   Date/Time Value Ref Range Status   05/20/2018 0457 53 (L) 65 - 99 mg/dL Final   05/19/2018 0450 111 (H) 65 - 99 mg/dL Final   05/18/2018 0425 323 (H) 65 - 99 mg/dL Final     Lab Results (last 72 hours)     Procedure Component Value Units Date/Time    POC Glucose Once [[id number]]  (Abnormal) Collected:  05/20/18 1047    Specimen:  Blood Updated:  05/20/18 1048     Glucose 358 (H) mg/dL     Narrative:       Meter: [id number] : [id number] Mil JOE    POC Glucose Once [[id number]]  (Abnormal) Collected:  05/20/18 0634    Specimen:  Blood Updated:  05/20/18 0635     Glucose 314 (H) mg/dL     Narrative:       Meter: [id number] : [id number] Rahel JOE    POC Glucose Once [[id number]]  (Abnormal) Collected:  05/20/18 0540    Specimen:  Blood Updated:  05/20/18 0551     Glucose 271 (H) mg/dL     Narrative:       Meter: [id number] : [id number] Rahel JOE    Basic Metabolic Panel [[id number]]  (Abnormal) Collected:  05/20/18 0457    Specimen:  Blood Updated:  05/20/18 0550     Glucose 53 (L) mg/dL      BUN 15 mg/dL      Creatinine 0.82 mg/dL      Sodium 137 mmol/L      Potassium 3.4 (L) mmol/L      Chloride 101 mmol/L      CO2 24.1 mmol/L      Calcium 9.1 mg/dL      eGFR Non African Amer 72 mL/min/1.73      BUN/Creatinine Ratio 18.3     Anion Gap 11.9 mmol/L     Narrative:       GFR Normal >60  Chronic  Kidney Disease <60  Kidney Failure <15    CBC & Differential [[id number]] Collected:  05/20/18 0457    Specimen:  Blood Updated:  05/20/18 0545    Narrative:       The following orders were created for panel order CBC & Differential.  Procedure                               Abnormality         Status                     ---------                               -----------         ------                     Scan Slide[[id number]]                                                                  CBC Auto Differential[[id number]]        Abnormal            Final result                 Please view results for these tests on the individual orders.    CBC Auto Differential [[id number]]  (Abnormal) Collected:  05/20/18 0457    Specimen:  Blood Updated:  05/20/18 0545     WBC 4.29 (L) 10*3/mm3      RBC 3.84 (L) 10*6/mm3      Hemoglobin 10.6 (L) g/dL      Hematocrit 32.9 (L) %      MCV 85.7 fL      MCH 27.6 pg      MCHC 32.2 (L) g/dL      RDW 15.7 (H) %      RDW-SD 49.1 fl      MPV -- fL      Comment: .        Platelets 15 (C) 10*3/mm3      Neutrophil % 54.4 %      Lymphocyte % 35.7 %      Monocyte % 8.9 %      Eosinophil % 0.5 %      Basophil % 0.5 %      Immature Grans % 2.1 (H) %      Neutrophils, Absolute 2.34 10*3/mm3      Lymphocytes, Absolute 1.53 10*3/mm3      Monocytes, Absolute 0.38 10*3/mm3      Eosinophils, Absolute 0.02 10*3/mm3      Basophils, Absolute 0.02 10*3/mm3      Immature Grans, Absolute 0.09 (H) 10*3/mm3      nRBC 0.0 /100 WBC     POC Glucose Once [[id number]]  (Abnormal) Collected:  05/20/18 0506    Specimen:  Blood Updated:  05/20/18 0518     Glucose 56 (L) mg/dL     Narrative:       Meter: [id number] : [id number] Rahel Josr NA    Blood Culture - Blood, [[id number]]  (Normal) Collected:  05/17/18 0054    Specimen:  Blood from Arm, Left Updated:  05/20/18 0100     Blood Culture No growth at 3 days    Blood Culture - Blood, [[id number]]  (Normal) Collected:  05/17/18 0049    Specimen:  Blood from Arm,  Right Updated:  05/20/18 0100     Blood Culture No growth at 3 days    POC Glucose Once [[id number]]  (Abnormal) Collected:  05/19/18 2358    Specimen:  Blood Updated:  05/20/18 0009     Glucose 296 (H) mg/dL     Narrative:       Meter: [id number] : [id number] Rahel Josr NA    POC Glucose Once [[id number]]  (Abnormal) Collected:  05/19/18 2041    Specimen:  Blood Updated:  05/19/18 2043     Glucose 366 (H) mg/dL     Narrative:       Meter: [id number] : [id number] Sulphur Josr NA    POC Glucose Once [[id number]]  (Abnormal) Collected:  05/19/18 1601    Specimen:  Blood Updated:  05/19/18 1602     Glucose 338 (H) mg/dL     Narrative:       Meter: [id number] : [id number] Mil Goel NA    POC Glucose Once [[id number]]  (Abnormal) Collected:  05/19/18 1113    Specimen:  Blood Updated:  05/19/18 1114     Glucose 215 (H) mg/dL     Narrative:       Meter: [id number] : [id number] Benjamín Paulino CNA    Cryptococcal Antigen [[id number]]  (Normal) Collected:  05/17/18 0049    Specimen:  Blood Updated:  05/19/18 0711     Crypto Ag Negative    POC Glucose Once [[id number]]  (Normal) Collected:  05/19/18 0620    Specimen:  Blood Updated:  05/19/18 0621     Glucose 103 mg/dL     Narrative:       Meter: [id number] : [id number] Bliss Emily NA    Ferritin [[id number]]  (Normal) Collected:  05/19/18 0450    Specimen:  Blood Updated:  05/19/18 0557     Ferritin 51.15 ng/mL     Basic Metabolic Panel [[id number]]  (Abnormal) Collected:  05/19/18 0450    Specimen:  Blood Updated:  05/19/18 0549     Glucose 111 (H) mg/dL      BUN 16 mg/dL      Creatinine 0.69 mg/dL      Sodium 138 mmol/L      Potassium 3.7 mmol/L      Chloride 102 mmol/L      CO2 24.3 mmol/L      Calcium 9.1 mg/dL      eGFR Non African Amer 88 mL/min/1.73      BUN/Creatinine Ratio 23.2     Anion Gap 11.7 mmol/L     Narrative:       GFR Normal >60  Chronic Kidney Disease <60  Kidney Failure <15    Iron Profile [[id number]]  (Abnormal) Collected:  05/19/18  0450    Specimen:  Blood Updated:  05/19/18 0549     Iron 50 mcg/dL      Iron Saturation 10 (L) %      Transferrin 320 mg/dL      TIBC 477 mcg/dL     CBC & Differential [[id number]] Collected:  05/19/18 0450    Specimen:  Blood Updated:  05/19/18 0537    Narrative:       The following orders were created for panel order CBC & Differential.  Procedure                               Abnormality         Status                     ---------                               -----------         ------                     Scan Slide[[id number]]                                                                  CBC Auto Differential[[id number]]        Abnormal            Final result                 Please view results for these tests on the individual orders.    CBC Auto Differential [[id number]]  (Abnormal) Collected:  05/19/18 0450    Specimen:  Blood Updated:  05/19/18 0537     WBC 2.95 (L) 10*3/mm3      RBC 3.58 (L) 10*6/mm3      Hemoglobin 9.9 (L) g/dL      Hematocrit 30.5 (L) %      MCV 85.2 fL      MCH 27.7 pg      MCHC 32.5 g/dL      RDW 15.6 (H) %      RDW-SD 48.6 fl      MPV -- fL      Comment: .        Platelets 5 (C) 10*3/mm3      Neutrophil % 63.1 %      Lymphocyte % 24.4 %      Monocyte % 10.8 %      Eosinophil % 0.7 %      Basophil % 1.0 %      Immature Grans % 2.4 (H) %      Neutrophils, Absolute 1.86 (L) 10*3/mm3      Lymphocytes, Absolute 0.72 (L) 10*3/mm3      Monocytes, Absolute 0.32 10*3/mm3      Eosinophils, Absolute 0.02 10*3/mm3      Basophils, Absolute 0.03 10*3/mm3      Immature Grans, Absolute 0.07 (H) 10*3/mm3      nRBC 0.0 /100 WBC     Retic With IRF & RET-He [[id number]]  (Abnormal) Collected:  05/19/18 0450    Specimen:  Blood Updated:  05/19/18 0527     Immature Reticulocyte Fraction 8.9 %      Reticulocyte % 2.72 (H) %      Reticulocyte Hgb 31.6 (L) pg     POC Glucose Once [[id number]]  (Abnormal) Collected:  05/19/18 0028    Specimen:  Blood Updated:  05/19/18 0029     Glucose 267 (H) mg/dL      Narrative:       Meter: [id number] : [id number] Izaiah JOE    POC Glucose Once [[id number]]  (Abnormal) Collected:  05/18/18 2037    Specimen:  Blood Updated:  05/18/18 2042     Glucose 452 (C) mg/dL     Narrative:       RN Notified R and V Meter: [id number] : [id number] Alvino JOE    POC Glucose Once [[id number]]  (Abnormal) Collected:  05/18/18 2034    Specimen:  Blood Updated:  05/18/18 2042     Glucose 414 (H) mg/dL     Narrative:       RN Notified R and V Repeat Test Meter: [id number] : [id number] Alvino Rubio    POC Glucose Once [[id number]]  (Abnormal) Collected:  05/18/18 1630    Specimen:  Blood Updated:  05/18/18 1631     Glucose 336 (H) mg/dL     Narrative:       Meter: [id number] : [id number] Gary Maria D JOE    [facility] Comprehensive Panel [[id number]] Collected:  05/17/18 0049    Specimen:  Blood Updated:  05/18/18 1518     Anti-DNA (DS) Ab Qn 1 IU/mL      Comment:                                    Negative      <5                                     Equivocal  5 - 9                                     Positive      >9        RNP Antibodies 0.4 AI      Mendoza Antibodies <0.2 AI      Antiscleroderma-70 Antibodies <0.2 AI      TOBI SSA (RO) Ab <0.2 AI      TOBI SSB (LA) Ab <0.2 AI      Antichromatin Antibodies <0.2 AI      ADAN-1 IgG <0.2 AI      Anti-Centromere B Antibodies <0.2 AI      See below: Comment     Comment: Autoantibody                       Disease Association  ------------------------------------------------------------                          Condition                  Frequency  ---------------------   ------------------------   ---------  Antinuclear Antibody,    SLE, mixed connective  Direct (SOFI-D)           tissue diseases  ---------------------   ------------------------   ---------  dsDNA                    SLE                        40 - 60%  ---------------------   ------------------------   ---------  Chromatin                Drug induced SLE                 90%                           SLE                        48 - 97%  ---------------------   ------------------------   ---------  SSA (Ro)                 SLE                        25 - 35%                           Sjogren's Syndrome         40 - 70%                            Lupus                 100%  ---------------------   ------------------------   ---------  SSB (La)                 SLE                             10%                           Sjogren's Syndrome              30%  ---------------------   -----------------------    ---------  Sm (anti-Smith)          SLE                        15 - 30%  ---------------------   -----------------------    ---------  RNP                      Mixed Connective Tissue                           Disease                         95%  (U1 nRNP,                SLE                        30 - 50%  anti-ribonucleoprotein)  Polymyositis and/or                           Dermatomyositis                 20%  ---------------------   ------------------------   ---------  Scl-70 (antiDNA          Scleroderma (diffuse)      20 - 35%  topoisomerase)           Crest                           13%  ---------------------   ------------------------   ---------  Basia-1                     Polymyositis and/or                           Dermatomyositis            20 - 40%  ---------------------   ------------------------   ---------  Centromere B             Scleroderma - Crest                           variant                         80%       Narrative:       Performed at:  01 - [facility] [address]  [id number]  : Quan Aguilar PhD, Phone:  [phone]    POC Glucose Once [[id number]]  (Abnormal) Collected:  18 1144    Specimen:  Blood Updated:  18 1146     Glucose 295 (H) mg/dL     Narrative:       Meter: [id number] : [id number] Gary Ghoshtetodd B-D Glucan [[id number]] Collected:  18 1057     Specimen:  Blood Updated:  05/18/18 1123    Hepatitis B & C Profile [[id number]] Collected:  05/18/18 1057    Specimen:  Blood Updated:  05/18/18 1122    Amylase [[id number]]  (Abnormal) Collected:  05/18/18 0425    Specimen:  Blood Updated:  05/18/18 1028     Amylase 16 (L) U/L     Lipase [[id number]]  (Abnormal) Collected:  05/18/18 0425    Specimen:  Blood Updated:  05/18/18 1028     Lipase 9 (L) U/L     POC Glucose Once [[id number]]  (Abnormal) Collected:  05/18/18 0634    Specimen:  Blood Updated:  05/18/18 0635     Glucose 334 (H) mg/dL     Narrative:       Meter: [id number] : [id number] Conner JOE    Basic Metabolic Panel [[id number]]  (Abnormal) Collected:  05/18/18 0425    Specimen:  Blood Updated:  05/18/18 0538     Glucose 323 (H) mg/dL      BUN 13 mg/dL      Creatinine 0.70 mg/dL      Sodium 136 mmol/L      Potassium 4.4 mmol/L      Chloride 100 mmol/L      CO2 23.6 mmol/L      Calcium 8.8 mg/dL      eGFR Non African Amer 87 mL/min/1.73      BUN/Creatinine Ratio 18.6     Anion Gap 12.4 mmol/L     Narrative:       GFR Normal >60  Chronic Kidney Disease <60  Kidney Failure <15    CBC & Differential [[id number]] Collected:  05/18/18 0425    Specimen:  Blood Updated:  05/18/18 0522    Narrative:       The following orders were created for panel order CBC & Differential.  Procedure                               Abnormality         Status                     ---------                               -----------         ------                     Scan Slide[[id number]]                                                                  CBC Auto Differential[[id number]]        Abnormal            Final result                 Please view results for these tests on the individual orders.    CBC Auto Differential [[id number]]  (Abnormal) Collected:  05/18/18 0425    Specimen:  Blood Updated:  05/18/18 0522     WBC 2.01 (L) 10*3/mm3      RBC 3.51 (L) 10*6/mm3      Hemoglobin 9.6 (L) g/dL      Hematocrit 29.9 (L) %       MCV 85.2 fL      MCH 27.4 pg      MCHC 32.1 (L) g/dL      RDW 15.4 (H) %      RDW-SD 48.0 fl      MPV -- fL      Comment: .        Platelets 4 (C) 10*3/mm3      Neutrophil % 63.2 %      Lymphocyte % 24.4 %      Monocyte % 11.4 %      Eosinophil % 0.5 %      Basophil % 0.5 %      Immature Grans % 1.0 (H) %      Neutrophils, Absolute 1.27 (L) 10*3/mm3      Lymphocytes, Absolute 0.49 (L) 10*3/mm3      Monocytes, Absolute 0.23 10*3/mm3      Eosinophils, Absolute 0.01 10*3/mm3      Basophils, Absolute 0.01 10*3/mm3      Immature Grans, Absolute 0.02 10*3/mm3      nRBC 0.0 /100 WBC     POC Glucose Once [[id number]]  (Abnormal) Collected:  05/17/18 2135    Specimen:  Blood Updated:  05/17/18 2136     Glucose 347 (H) mg/dL     Narrative:       Meter: [id number] : [id number] Conner JOE    POC Glucose Once [[id number]]  (Abnormal) Collected:  05/17/18 1651    Specimen:  Blood Updated:  05/17/18 1653     Glucose 437 (H) mg/dL     Narrative:       RN Notified R and V Meter: [id number] : [id number] Tj STEPHENS CNA    TSPOT [[id number]] Collected:  05/17/18 0049    Specimen:  Blood Updated:  05/17/18 1425     TSPOTTB Non-reportable due to insufficient cells.  Need 2x10e6 of peripheral blood mononuclear cells (PBMC) from whole blood. Recommend recollection of specimen. Two full tubes required for retest.     T-SPOT Panel A 0     T-SPOT Panel B 0     TSPOT NIL CONTROL INTERP Failed     TSPOT POS CONTROL INTERP Failed        Imaging Results (last 72 hours)     Procedure Component Value Units Date/Time    CT Chest Without Contrast [[id number]] Collected:  05/17/18 1433     Updated:  05/17/18 1441    Narrative:       CT CHEST WO CONTRAST-     CLINICAL HISTORY: Dyspnea. Chest pain. Fever.     TECHNIQUE: Spiral CT images were obtained through the chest without IV  contrast and were reconstructed in 3 mm thick slices.     Radiation dose reduction techniques were utilized, including automated  exposure control and exposure  modulation based on body size.     COMPARISON: Chest x-ray dated 3/30/2018.     FINDINGS: There is minimal pleural and parenchymal scarring at the left  lung apex. The lungs are otherwise clear. There are no infiltrates or  discrete lung masses. There is no mediastinal or hilar or axillary  lymphadenopathy. There are no pleural effusions. Images through the  upper abdomen show mild diffuse injection of the peripancreatic fat that  could indicate acute pancreatitis. Correlation with clinical findings is  recommended.       Impression:       Unremarkable CT scan of the chest except for equivocal  evidence of pancreatitis in the upper abdomen as noted.     This report was finalized on 5/17/2018 2:38 PM by Dr. Marco Ramos M.D.             Medication Review:       Current Facility-Administered Medications:   •  acetaminophen (TYLENOL) tablet 325 mg, 325 mg, Oral, Daily, Gerson Olson II, MD, 325 mg at 05/19/18 1641  •  acetaminophen (TYLENOL) tablet 650 mg, 650 mg, Oral, Q4H PRN, Jose Almanza MD  •  acetaminophen (TYLENOL) tablet 650 mg, 650 mg, Oral, Daily, Gerson Olson II, MD, 650 mg at 05/20/18 0923  •  ALPRAZolam (XANAX) tablet 0.5 mg, 0.5 mg, Oral, BID PRN, Jose Almanza MD, 0.5 mg at 05/19/18 2257  •  buPROPion XL (WELLBUTRIN XL) 24 hr tablet 150 mg, 150 mg, Oral, Daily, Jose Almanza MD, 150 mg at 05/20/18 0923  •  clotrimazole (MYCELEX) omer 10 mg, 10 mg, Oral, 5x Daily, Jose Almanza MD, 10 mg at 05/20/18 0604  •  cyclobenzaprine (FLEXERIL) tablet 5 mg, 5 mg, Oral, TID PRN, Jose Almanza MD, 5 mg at 05/17/18 0150  •  cycloSPORINE (RESTASIS) 0.05 % ophthalmic emulsion 1 drop, 1 drop, Both Eyes, BID, Jose Almanza MD, 1 drop at 05/20/18 0922  •  dextrose (D50W) solution 25 g, 25 g, Intravenous, Q15 Min PRN, Jose Almanza MD, 25 g at 05/20/18 0513  •  dextrose (GLUTOSE) oral gel 15 g, 15 g, Oral, Q15 Min PRN, Jose Almanza MD  •  diphenhydrAMINE (BENADRYL) capsule 25 mg, 25  mg, Oral, Daily, Gerson Olson II, MD, 25 mg at 05/18/18 0942  •  DULoxetine (CYMBALTA) DR capsule 90 mg, 90 mg, Oral, Daily, Jose Almanza MD, 90 mg at 05/20/18 0923  •  ferrous sulfate tablet 325 mg, 325 mg, Oral, BID, Jose Almanza MD, 325 mg at 05/20/18 0923  •  folic acid (FOLVITE) tablet 1 mg, 1 mg, Oral, Daily, Jose Almanza MD, 1 mg at 05/20/18 0924  •  glucagon (human recombinant) (GLUCAGEN DIAGNOSTIC) injection 1 mg, 1 mg, Subcutaneous, PRN, Jose Almanza MD  •  hydrOXYzine (ATARAX) tablet 25 mg, 25 mg, Oral, TID PRN, Jose Almanza MD  •  immune globulin (human) (GAMMAGARD) infusion 30 g, 400 mg/kg, Intravenous, Daily, Gerson Olson II, MD, 30 g at 05/20/18 0957  •  insulin aspart (novoLOG) injection 0-14 Units, 0-14 Units, Subcutaneous, 4x Daily With Meals & Nightly, Jose Almanza MD, 10 Units at 05/20/18 0956  •  insulin aspart (novoLOG) injection 30 Units, 30 Units, Subcutaneous, TID With Meals, Lamin SHIPLEY MD, 30 Units at 05/20/18 0957  •  insulin detemir (LEVEMIR) injection 25 Units, 25 Units, Subcutaneous, Nightly, Lamin SHIPLEY MD  •  insulin detemir (LEVEMIR) injection 70 Units, 70 Units, Subcutaneous, QAM, Lamin SHIPLEY MD, 70 Units at 05/20/18 0957  •  iron sucrose (VENOFER) 300 mg in sodium chloride 0.9 % 250 mL IVPB, 300 mg, Intravenous, Daily, Gerson Olson II, MD, 300 mg at 05/19/18 1641  •  levETIRAcetam (KEPPRA) tablet 500 mg, 500 mg, Oral, BID, Jose Almanza MD, 500 mg at 05/20/18 0924  •  Methylnaltrexone Bromide tablet 150 mg, 150 mg, Oral, Daily, Wesly Walsh MD, Stopped at 05/20/18 1053  •  oxyCODONE (ROXICODONE) immediate release tablet 5 mg, 5 mg, Oral, Q6H PRN, Jose Almanza MD, 5 mg at 05/19/18 1848  •  pantoprazole (PROTONIX) EC tablet 40 mg, 40 mg, Oral, Q AM, Wesly Walsh MD, 40 mg at 05/20/18 0604  •  polyethylene glycol 3350 powder (packet), 17 g, Oral, Daily, Wesly Walsh MD,  17 g at 05/19/18 1222  •  predniSONE (DELTASONE) tablet 75 mg, 75 mg, Oral, Daily With Breakfast, Gerson Olson II, MD, 75 mg at 05/20/18 0924  •  pregabalin (LYRICA) capsule 75 mg, 75 mg, Oral, BID, Jose Almanza MD, 75 mg at 05/20/18 0923  •  promethazine (PHENERGAN) tablet 25 mg, 25 mg, Oral, Q6H PRN, Jose Almanza MD, 25 mg at 05/19/18 1848  •  rifaximin (XIFAXAN) tablet 550 mg, 550 mg, Oral, BID, Jose Almanza MD, 550 mg at 05/20/18 0924  •  sodium chloride 0.9 % flush 1-10 mL, 1-10 mL, Intravenous, PRN, Jose Almanza MD, 10 mL at 05/19/18 2134  •  Insert peripheral IV, , , Once **AND** sodium chloride 0.9 % flush 10 mL, 10 mL, Intravenous, PRN, Mauro Moore MD  •  spironolactone (ALDACTONE) tablet 100 mg, 100 mg, Oral, Daily, Jose Almanza MD, 100 mg at 05/20/18 0923  •  sucralfate (CARAFATE) tablet 1 g, 1 g, Oral, BID, Jose Almanza MD, 1 g at 05/20/18 0924  •  trimethobenzamide (TIGAN) capsule 300 mg, 300 mg, Oral, TID, Jose Almanza MD, 300 mg at 05/20/18 0923    Assessment/Plan     Patient Active Problem List   Diagnosis   • Cirrhosis of liver   • Rheumatoid arthritis   • Anxiety and depression   • Type 2 diabetes mellitus, uncontrolled, with neuropathy   • Fibrositis   • Change in blood platelet count   • Pancytopenia   • Hypersplenism   • Nausea   • DUKES (nonalcoholic steatohepatitis)   • Hyperlipidemia   • Generalized abdominal pain   • Hematemesis   • Ascites   • Portal hypertension   • Systemic lupus   • Secondary esophageal varices without bleeding   • Gastroparesis   • Cirrhosis of liver without ascites   • Diabetic ketoacidosis without coma associated with type 2 diabetes mellitus   • Diabetic peripheral neuropathy   • History of seizure disorder   • Hepatic encephalopathy   • Abnormal finding of blood chemistry    • Fever   • Acute ITP   Uncontrolled type 2 diabetes mellitus  Uncontrolled type 2 diabetes mellitus with peripheral neuropathy  Current use of steroids  "compensating diabetes  Cirrhosis of liver with portal hypertension  Hypoglycemia    Patient had low blood sugar in the morning  I will continue the Levemir 70 the morning but decrease the Levemir to 25 units at night  Will continue the NovoLog 30 units before each meal  Will continue to monitor Accu-Cheks and then adjust insulin as needed  Patient verbalized understanding  Will also change the sliding scale    The total time spent for old record and lab review and floor time was more than 35 min of which greater than 20 min of time  ( greater than 50% of the total time )  was spent face to face with the patient counseling and coordination of care owas spent on counseling the patient on recommended evaluation and treatment options, instructions for management/treatment and /or follow up  and importance of compliance with chosen management or treatment options      Lamin Lopez MD FACE.  05/20/18  11:09 AM      [facility] Dragon / transcription disclaimer:     \"Dictated utilizing Dragon dictation\".   "

## 2018-05-20 NOTE — PROGRESS NOTES
"        REASON FOR FOLLOWUP/CHIEF COMPLAINT:  ITP    HISTORY OF PRESENT ILLNESS:   Continues to deny bleeding.  No epistaxis since admission.  Some mild worsening of the bruising on her abdomen attributable to the subcutaneous injections with severe thrombocytopenia.  No other areas of bruising.  Nurse reports the patient had a couple of bowel movements with no evidence of bleeding.    Past Medical History, Past Surgical History, Social History, Family History have been reviewed and are without significant changes except as mentioned.    Review of Systems   Review of Systems   Constitutional: Negative for activity change.   HENT: Negative for nosebleeds and trouble swallowing.    Respiratory: Negative for shortness of breath and wheezing.    Cardiovascular: Negative for chest pain and palpitations.   Gastrointestinal: Negative for constipation, diarrhea and nausea.   Genitourinary: Negative for dysuria and hematuria.   Musculoskeletal: Negative for arthralgias and myalgias.   Skin: Negative for rash and wound.   Neurological: Negative for seizures and syncope.   Hematological: Negative for adenopathy. Bruises/bleeds easily.   Psychiatric/Behavioral: Negative for confusion.       Medications:  The current medication list was reviewed in the EMR    ALLERGIES:    Allergies   Allergen Reactions   • Albuterol Anaphylaxis   • Lactulose Nausea And Vomiting and Other (See Comments)     Severe abdominal pain   • Tramadol Other (See Comments)     Per pt causes her \"palsy, meaning shaking and tremors\" and gi upset, nausea   • Quinine Derivatives Nausea And Vomiting              Vitals:    05/19/18 1815 05/19/18 1939 05/19/18 2356 05/20/18 0713   BP: 147/89 160/88 125/76 131/85   BP Location: Left arm Left arm Left arm Left arm   Patient Position: Sitting Lying Lying    Pulse: 98 104 108    Resp:  18 18 16   Temp:  98 °F (36.7 °C) 98.1 °F (36.7 °C) 98.6 °F (37 °C)   TempSrc:  Oral Oral Oral   SpO2:    97%   Weight:     "   Height:         Physical Exam    CONSTITUTIONAL:  Vital signs reviewed.  No distress, looks comfortable.  EYES:  Conjunctiva and lids unremarkable.  PERRLA  EARS,NOSE,MOUTH,THROAT:  Ears and nose appear unremarkable.  Lips, teeth, gums appear unremarkable.  RESPIRATORY:  Normal respiratory effort.  Lungs clear to auscultation bilaterally.  CARDIOVASCULAR:  Normal S1, S2.  No murmurs rubs or gallops.  No significant lower extremity edema.  GASTROINTESTINAL: Abdomen appears unremarkable.  Nontender.  No hepatomegaly.  No splenomegaly.  NEURO: cranial nerves 2-12 grossly intact.  No focal deficits.  Appears to have equal strength all 4 extremities.  MUSCULOSKELETAL:  Unremarkable digits/nails.  No cyanosis or clubbing.  SKIN:  Warm.  No rashes.some bruising at the sites of subcutaneous injections in the abdomen which is progressing some.  PSYCHIATRIC:  Normal judgment and insight.  Normal mood and affect.       RECENT LABS:  WBC   Date Value Ref Range Status   05/20/2018 4.29 (L) 4.50 - 10.70 10*3/mm3 Final   05/19/2018 2.95 (L) 4.50 - 10.70 10*3/mm3 Final   05/18/2018 2.01 (L) 4.50 - 10.70 10*3/mm3 Final     Hemoglobin   Date Value Ref Range Status   05/20/2018 10.6 (L) 11.9 - 15.5 g/dL Final   05/19/2018 9.9 (L) 11.9 - 15.5 g/dL Final   05/18/2018 9.6 (L) 11.9 - 15.5 g/dL Final     Platelets   Date Value Ref Range Status   05/20/2018 15 (C) 140 - 500 10*3/mm3 Final   05/19/2018 5 (C) 140 - 500 10*3/mm3 Final   05/18/2018 4 (C) 140 - 500 10*3/mm3 Final                 ASSESSMENT/PLAN:     *ITP.  Appears to be most consistent with ITP.  Peripheral smear shows no significant amount of schistocytes and no platelet clumping.  IPF elevated at 18.4%, consistent with a destructive etiology of thrombocytopenia.  Unremarkable: Folate, B12, fibrinogen, PTT.  Did not respond to platelet transfusion, also consistent with a destructive process.    Her only new medicine is Tigan, started mid April 2018 for nausea from  gastroparesis.  Upon my review on Micromedex, blood dyscrasias have been reported, but no mention of immune mediated thrombocytopenia.  I doubt this is the cause of the platelet drop.  PLT unchanged this morning, but this is not surprising as this is only day 3 of prednisone and IVIG.   PLT Improved to 15 today from 5 yesterday.    *Mild elevation in INR at 1.26.  Suspect this is due to cirrhosis.     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  WBC improved from 2 up to 3, then again up to 4.3 today.  Hb better today, up to 10.6 from 9.9     *B12 deficiency.  On oral B12 since December 2013.     *History of esophageal varices banding.     *Iron deficiency anemia due to GI bleeding.  Has been on oral iron twice per day.  Ferritin 51 with 10% saturation.  This suggests iron deficiency.  300 mg Venofer daily day 2 of 2.     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  This is of unknown significance.     *Rheumatoid arthritis.  Reports she hasn't been unable to take Enbrel since December due to persistent fevers.     *Lupus     *Diabetes.  Hyperglycemia from steroids may be a problem.  Defer to hospitalist.     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     *Fever of up to 101 for a couple months.  She was trying to have a 48 hour time period with no fever associated started back on Enbrel.  Dr. Huertas infectious diseases is now following peripherally.  He does not feel there is an infectious etiology of the fever.  CT chest 5/17/18 negative for an etiology fever.  It did have changes suggestive of pancreatitis.    Last fever 100.4 the evening of 5/16/18.  Afebrile since.  (Has been on high-dose prednisone).     Plan  · Continue prednisone 75 mg daily  Day 4  · IVIG 400 mg/kg daily day 4 of 5 days  · Venofer 300 mg daily day 2 of 2.  · Defer  expected hyperglycemia from steroids to hospitalist/endocrinology services.  · She usually sees Dr. Mcdowell in our office.    Nurse assisted with history.  Case will be discussed with Dr. Jacobs from our office who will begin seeing the patient tomorrow.  We will see if she has any other thoughts on management.

## 2018-05-20 NOTE — NURSING NOTE
Pt called out to staff c/o feeling weak & jittery. Pt blood glucose is 56. Pt given PB, mj august, OJ 4 oz & 1 amp of D50. BS up to 271 within 15 minutes.

## 2018-05-20 NOTE — PROGRESS NOTES
[facility] for Dr Coleman  Inpatient Progress Note    Reason for Follow Up:cirrhosis    Subjective     Interval History:   No bleeding.  She c/o discomfort related to her arthritis.  She does not have much of an appetite-this is normal for her.  No abdominal pain or nausea.    Current Facility-Administered Medications:   •  acetaminophen (TYLENOL) tablet 650 mg, 650 mg, Oral, Q4H PRN, Jose Almanza MD  •  acetaminophen (TYLENOL) tablet 650 mg, 650 mg, Oral, Daily, eGrson Olson II, MD, 650 mg at 05/20/18 0923  •  ALPRAZolam (XANAX) tablet 0.5 mg, 0.5 mg, Oral, BID PRN, Jose Almanza MD, 0.5 mg at 05/19/18 2257  •  buPROPion XL (WELLBUTRIN XL) 24 hr tablet 150 mg, 150 mg, Oral, Daily, Jose Almanza MD, 150 mg at 05/20/18 0923  •  clotrimazole (MYCELEX) omer 10 mg, 10 mg, Oral, 5x Daily, Jose Almanza MD, 10 mg at 05/20/18 1444  •  cyclobenzaprine (FLEXERIL) tablet 5 mg, 5 mg, Oral, TID PRN, Jose Almanza MD, 5 mg at 05/17/18 0150  •  cycloSPORINE (RESTASIS) 0.05 % ophthalmic emulsion 1 drop, 1 drop, Both Eyes, BID, Jose Almanza MD, 1 drop at 05/20/18 0922  •  dextrose (D50W) solution 25 g, 25 g, Intravenous, Q15 Min PRN, Jose Almanza MD, 25 g at 05/20/18 0513  •  dextrose (GLUTOSE) oral gel 15 g, 15 g, Oral, Q15 Min PRN, Jose Almanza MD  •  diphenhydrAMINE (BENADRYL) capsule 25 mg, 25 mg, Oral, Daily, Gerson Olson II, MD, 25 mg at 05/20/18 1444  •  DULoxetine (CYMBALTA) DR capsule 90 mg, 90 mg, Oral, Daily, Jose Almanza MD, 90 mg at 05/20/18 0923  •  ferrous sulfate tablet 325 mg, 325 mg, Oral, BID, Jose Almanza MD, 325 mg at 05/20/18 0923  •  folic acid (FOLVITE) tablet 1 mg, 1 mg, Oral, Daily, Jose Almanza MD, 1 mg at 05/20/18 0924  •  glucagon (human recombinant) (GLUCAGEN DIAGNOSTIC) injection 1 mg, 1 mg, Subcutaneous, PRN, Jose Almanza MD  •  hydrOXYzine (ATARAX) tablet 25 mg, 25 mg, Oral, TID PRN, Jose Almanza MD  •  immune  globulin (human) (GAMMAGARD) infusion 30 g, 400 mg/kg, Intravenous, Daily, Gerson Olson II, MD, 30 g at 05/20/18 0957  •  insulin aspart (novoLOG) injection 0-14 Units, 0-14 Units, Subcutaneous, 4x Daily With Meals & Nightly, Jose Almanza MD, 10 Units at 05/20/18 0956  •  insulin aspart (novoLOG) injection 30 Units, 30 Units, Subcutaneous, TID With Meals, Lamin SHIPLEY MD, 30 Units at 05/20/18 0957  •  insulin detemir (LEVEMIR) injection 25 Units, 25 Units, Subcutaneous, Nightly, Lamin SHIPLEY MD  •  insulin detemir (LEVEMIR) injection 70 Units, 70 Units, Subcutaneous, QAM, Lamin SHIPLEY MD, 70 Units at 05/20/18 0957  •  levETIRAcetam (KEPPRA) tablet 500 mg, 500 mg, Oral, BID, Jose Almanza MD, 500 mg at 05/20/18 0924  •  [START ON 5/21/2018] Methylnaltrexone Bromide tablet 450 mg, 450 mg, Oral, Daily, Jose Almanza MD  •  oxyCODONE (ROXICODONE) immediate release tablet 5 mg, 5 mg, Oral, Q6H PRN, Jose Almanza MD, 5 mg at 05/20/18 1538  •  pantoprazole (PROTONIX) EC tablet 40 mg, 40 mg, Oral, Q AM, Wesly Walsh MD, 40 mg at 05/20/18 0604  •  polyethylene glycol 3350 powder (packet), 17 g, Oral, Daily, Wesly Walsh MD, 17 g at 05/19/18 1222  •  predniSONE (DELTASONE) tablet 75 mg, 75 mg, Oral, Daily With Breakfast, Gerson Olson II, MD, 75 mg at 05/20/18 0924  •  pregabalin (LYRICA) capsule 75 mg, 75 mg, Oral, BID, Jose Almanza MD, 75 mg at 05/20/18 0923  •  promethazine (PHENERGAN) tablet 25 mg, 25 mg, Oral, Q6H PRN, Jose Almanza MD, 25 mg at 05/19/18 1848  •  rifaximin (XIFAXAN) tablet 550 mg, 550 mg, Oral, BID, Jose Almanza MD, 550 mg at 05/20/18 0984  •  sodium chloride 0.9 % flush 1-10 mL, 1-10 mL, Intravenous, PRN, Jose Almanza MD, 10 mL at 05/19/18 9211  •  Insert peripheral IV, , , Once **AND** sodium chloride 0.9 % flush 10 mL, 10 mL, Intravenous, PRN, Mauro Moore MD  •  spironolactone (ALDACTONE) tablet 100 mg, 100  mg, Oral, Daily, Jose Almanza MD, 100 mg at 05/20/18 0923  •  sucralfate (CARAFATE) tablet 1 g, 1 g, Oral, BID, Jose Almanza MD, 1 g at 05/20/18 0924  •  trimethobenzamide (TIGAN) capsule 300 mg, 300 mg, Oral, TID, Jose Almanza MD, 300 mg at 05/20/18 1538  Review of Systems:    All systems were reviewed and negative except for:  Musculoskeletal: positive for  joint pain    Objective     Vital Signs  Temp:  [98 °F (36.7 °C)-98.9 °F (37.2 °C)] 98.9 °F (37.2 °C)  Heart Rate:  [] 108  Resp:  [16-20] 20  BP: (125-161)/(76-97) 135/81  Body mass index is 26.97 kg/m².    Intake/Output Summary (Last 24 hours) at 05/20/18 1553  Last data filed at 05/20/18 0819   Gross per 24 hour   Intake              770 ml   Output                0 ml   Net              770 ml     I/O this shift:  In: 240 [P.O.:240]  Out: -      Physical Exam:   General: patient awake, alert and cooperative   Eyes: Normal lids and lashes, no scleral icterus   Neck: supple, normal ROM   Skin: warm and dry, not jaundiced   Abdomen: soft, nontender, nondistended; normal bowel sounds   Psychiatric: Normal mood and behavior; memory intact     Results Review:     I reviewed the patient's new clinical results.         Results from last 7 days  Lab Units 05/20/18 0457 05/19/18 0450 05/18/18 0425   WBC 10*3/mm3 4.29* 2.95* 2.01*   HEMOGLOBIN g/dL 10.6* 9.9* 9.6*   HEMATOCRIT % 32.9* 30.5* 29.9*   PLATELETS 10*3/mm3 15* 5* 4*       Results from last 7 days  Lab Units 05/20/18 0457 05/19/18  0450 05/18/18  0425  05/16/18  1943   SODIUM mmol/L 137 138 136  < > 140   POTASSIUM mmol/L 3.4* 3.7 4.4  < > 4.3   CHLORIDE mmol/L 101 102 100  < > 101   CO2 mmol/L 24.1 24.3 23.6  < > 24.4   BUN mg/dL 15 16 13  < > 6   CREATININE mg/dL 0.82 0.69 0.70  < > 0.77   CALCIUM mg/dL 9.1 9.1 8.8  < > 8.7   BILIRUBIN mg/dL  --   --   --   --  0.8   ALK PHOS U/L  --   --   --   --  125*   ALT (SGPT) U/L  --   --   --   --  23   AST (SGOT) U/L  --   --   --   --   45*   GLUCOSE mg/dL 53* 111* 323*  < > 347*   < > = values in this interval not displayed.    Results from last 7 days  Lab Units 05/16/18  1943   INR  1.26*       Lab Results  Lab Value Date/Time   LIPASE 9 (L) 05/18/2018 0425   LIPASE 22 12/16/2017 1650   LIPASE 10 (L) 10/30/2017 1732   LIPASE 9 (L) 02/21/2017 1202   LIPASE 16 11/10/2014 0724       Radiology:  CT Chest Without Contrast   Final Result   Unremarkable CT scan of the chest except for equivocal   evidence of pancreatitis in the upper abdomen as noted.       This report was finalized on 5/17/2018 2:38 PM by Dr. Marco Ramos M.D.              Assessment/Plan     Patient Active Problem List   Diagnosis   • Cirrhosis of liver   • Rheumatoid arthritis   • Anxiety and depression   • Type 2 diabetes mellitus, uncontrolled, with neuropathy   • Fibrositis   • Change in blood platelet count   • Pancytopenia   • Hypersplenism   • Nausea   • DUKES (nonalcoholic steatohepatitis)   • Hyperlipidemia   • Generalized abdominal pain   • Hematemesis   • Ascites   • Portal hypertension   • Systemic lupus   • Secondary esophageal varices without bleeding   • Gastroparesis   • Cirrhosis of liver without ascites   • Diabetic ketoacidosis without coma associated with type 2 diabetes mellitus   • Diabetic peripheral neuropathy   • History of seizure disorder   • Hepatic encephalopathy   • Abnormal finding of blood chemistry    • Fever   • Acute ITP     Assessment:  1. DUKES cirrhosis  2. Acute ITP  3. Hx esophageal varices     Plan:  - Hb stable thankfully  - platelets up to 15K today  - If she exhibits evidence of GI bleeding, recommend octreotide gtt - will not be amenable to endoscopic therapy given severity of thrombocytopenia  - will continue to follow closely    I discussed the patients findings and my recommendations with patient.    Kimberly Gray MD

## 2018-05-20 NOTE — PLAN OF CARE
Problem: Patient Care Overview  Goal: Plan of Care Review  Outcome: Ongoing (interventions implemented as appropriate)   05/20/18 9219   Coping/Psychosocial   Plan of Care Reviewed With patient;spouse   Plan of Care Review   Progress improving   OTHER   Outcome Summary had ivig and iron. blood sugars and coverage noted. resting comfortably with no complaints. safety maintained, will continue to monitor.        Problem: Fall Risk (Adult)  Goal: Absence of Fall  Outcome: Ongoing (interventions implemented as appropriate)

## 2018-05-21 LAB
ANION GAP SERPL CALCULATED.3IONS-SCNC: 13.7 MMOL/L
BASOPHILS # BLD AUTO: 0.02 10*3/MM3 (ref 0–0.2)
BASOPHILS NFR BLD AUTO: 0.7 % (ref 0–1.5)
BUN BLD-MCNC: 16 MG/DL (ref 6–20)
BUN/CREAT SERPL: 18.2 (ref 7–25)
CALCIUM SPEC-SCNC: 9 MG/DL (ref 8.6–10.5)
CHLORIDE SERPL-SCNC: 97 MMOL/L (ref 98–107)
CO2 SERPL-SCNC: 21.3 MMOL/L (ref 22–29)
CREAT BLD-MCNC: 0.88 MG/DL (ref 0.57–1)
DEPRECATED RDW RBC AUTO: 51.7 FL (ref 37–54)
EOSINOPHIL # BLD AUTO: 0.01 10*3/MM3 (ref 0–0.7)
EOSINOPHIL NFR BLD AUTO: 0.3 % (ref 0.3–6.2)
ERYTHROCYTE [DISTWIDTH] IN BLOOD BY AUTOMATED COUNT: 16.1 % (ref 11.7–13)
GFR SERPL CREATININE-BSD FRML MDRD: 67 ML/MIN/1.73
GLUCOSE BLD-MCNC: 360 MG/DL (ref 65–99)
GLUCOSE BLDC GLUCOMTR-MCNC: 222 MG/DL (ref 70–130)
GLUCOSE BLDC GLUCOMTR-MCNC: 296 MG/DL (ref 70–130)
GLUCOSE BLDC GLUCOMTR-MCNC: 310 MG/DL (ref 70–130)
GLUCOSE BLDC GLUCOMTR-MCNC: 338 MG/DL (ref 70–130)
HBV CORE AB SER DONR QL IA: NEGATIVE
HBV CORE IGM SERPL QL IA: NEGATIVE
HBV E AB SERPL QL IA: NEGATIVE
HBV E AG SERPL QL IA: NEGATIVE
HBV SURFACE AB SER QL: REACTIVE
HBV SURFACE AG SERPL QL IA: NEGATIVE
HCT VFR BLD AUTO: 30.2 % (ref 35.6–45.5)
HCV AB S/CO SERPL IA: 0.1 S/CO RATIO (ref 0–0.9)
HGB BLD-MCNC: 9.5 G/DL (ref 11.9–15.5)
IMM GRANULOCYTES # BLD: 0.12 10*3/MM3 (ref 0–0.03)
IMM GRANULOCYTES NFR BLD: 4.1 % (ref 0–0.5)
LABORATORY COMMENT REPORT: NORMAL
LYMPHOCYTES # BLD AUTO: 0.63 10*3/MM3 (ref 0.9–4.8)
LYMPHOCYTES NFR BLD AUTO: 21.6 % (ref 19.6–45.3)
MCH RBC QN AUTO: 27.7 PG (ref 26.9–32)
MCHC RBC AUTO-ENTMCNC: 31.5 G/DL (ref 32.4–36.3)
MCV RBC AUTO: 88 FL (ref 80.5–98.2)
MONOCYTES # BLD AUTO: 0.41 10*3/MM3 (ref 0.2–1.2)
MONOCYTES NFR BLD AUTO: 14.1 % (ref 5–12)
NEUTROPHILS # BLD AUTO: 1.84 10*3/MM3 (ref 1.9–8.1)
NEUTROPHILS NFR BLD AUTO: 63.3 % (ref 42.7–76)
NRBC BLD MANUAL-RTO: 0.9 /100 WBC (ref 0–0)
PLATELET # BLD AUTO: 20 10*3/MM3 (ref 140–500)
PMV BLD AUTO: ABNORMAL FL (ref 6–12)
POTASSIUM BLD-SCNC: 4.2 MMOL/L (ref 3.5–5.2)
RBC # BLD AUTO: 3.43 10*6/MM3 (ref 3.9–5.2)
SODIUM BLD-SCNC: 132 MMOL/L (ref 136–145)
WBC NRBC COR # BLD: 2.91 10*3/MM3 (ref 4.5–10.7)

## 2018-05-21 PROCEDURE — 63710000001 PROMETHAZINE PER 25 MG: Performed by: HOSPITALIST

## 2018-05-21 PROCEDURE — 36415 COLL VENOUS BLD VENIPUNCTURE: CPT | Performed by: HOSPITALIST

## 2018-05-21 PROCEDURE — 63710000001 DIPHENHYDRAMINE PER 50 MG: Performed by: INTERNAL MEDICINE

## 2018-05-21 PROCEDURE — 85025 COMPLETE CBC W/AUTO DIFF WBC: CPT | Performed by: INTERNAL MEDICINE

## 2018-05-21 PROCEDURE — 63710000001 PREDNISONE PER 1 MG: Performed by: INTERNAL MEDICINE

## 2018-05-21 PROCEDURE — 63710000001 PREDNISONE PER 5 MG: Performed by: INTERNAL MEDICINE

## 2018-05-21 PROCEDURE — 82962 GLUCOSE BLOOD TEST: CPT

## 2018-05-21 PROCEDURE — 25010000002 IMMUNE GLOBULIN (HUMAN) 10 GM/100ML SOLUTION: Performed by: INTERNAL MEDICINE

## 2018-05-21 PROCEDURE — 99232 SBSQ HOSP IP/OBS MODERATE 35: CPT | Performed by: INTERNAL MEDICINE

## 2018-05-21 PROCEDURE — 63710000001 INSULIN ASPART PER 5 UNITS: Performed by: INTERNAL MEDICINE

## 2018-05-21 PROCEDURE — 63710000001 INSULIN ASPART PER 5 UNITS: Performed by: HOSPITALIST

## 2018-05-21 PROCEDURE — 25010000002 IMMUNE GLOBULIN (HUMAN) 20 GM/200ML SOLUTION: Performed by: INTERNAL MEDICINE

## 2018-05-21 PROCEDURE — 99233 SBSQ HOSP IP/OBS HIGH 50: CPT | Performed by: INTERNAL MEDICINE

## 2018-05-21 PROCEDURE — 80048 BASIC METABOLIC PNL TOTAL CA: CPT | Performed by: HOSPITALIST

## 2018-05-21 RX ADMIN — INSULIN ASPART 30 UNITS: 100 INJECTION, SOLUTION INTRAVENOUS; SUBCUTANEOUS at 09:27

## 2018-05-21 RX ADMIN — IMMUNE GLOBULIN INFUSION (HUMAN) 10 G: 100 INJECTION, SOLUTION INTRAVENOUS; SUBCUTANEOUS at 14:58

## 2018-05-21 RX ADMIN — CYCLOSPORINE 1 DROP: 0.5 EMULSION OPHTHALMIC at 09:26

## 2018-05-21 RX ADMIN — FERROUS SULFATE TAB 325 MG (65 MG ELEMENTAL FE) 325 MG: 325 (65 FE) TAB at 21:11

## 2018-05-21 RX ADMIN — RIFAXIMIN 550 MG: 550 TABLET ORAL at 21:11

## 2018-05-21 RX ADMIN — CLOTRIMAZOLE 10 MG: 10 LOZENGE ORAL at 11:58

## 2018-05-21 RX ADMIN — TRIMETHOBENZAMIDE HYDROCHLORIDE 300 MG: 300 CAPSULE ORAL at 09:26

## 2018-05-21 RX ADMIN — INSULIN ASPART 10 UNITS: 100 INJECTION, SOLUTION INTRAVENOUS; SUBCUTANEOUS at 11:49

## 2018-05-21 RX ADMIN — OXYCODONE HYDROCHLORIDE 5 MG: 5 TABLET ORAL at 19:14

## 2018-05-21 RX ADMIN — LEVETIRACETAM 500 MG: 500 TABLET, FILM COATED ORAL at 09:26

## 2018-05-21 RX ADMIN — INSULIN ASPART 30 UNITS: 100 INJECTION, SOLUTION INTRAVENOUS; SUBCUTANEOUS at 11:49

## 2018-05-21 RX ADMIN — SPIRONOLACTONE 100 MG: 100 TABLET ORAL at 09:27

## 2018-05-21 RX ADMIN — ACETAMINOPHEN 650 MG: 325 TABLET, FILM COATED ORAL at 09:27

## 2018-05-21 RX ADMIN — ACETAMINOPHEN 650 MG: 325 TABLET ORAL at 16:56

## 2018-05-21 RX ADMIN — INSULIN ASPART 10 UNITS: 100 INJECTION, SOLUTION INTRAVENOUS; SUBCUTANEOUS at 21:11

## 2018-05-21 RX ADMIN — PREDNISONE 75 MG: 5 TABLET ORAL at 09:26

## 2018-05-21 RX ADMIN — DULOXETINE HYDROCHLORIDE 90 MG: 60 CAPSULE, DELAYED RELEASE ORAL at 09:26

## 2018-05-21 RX ADMIN — PROMETHAZINE HYDROCHLORIDE 25 MG: 25 TABLET ORAL at 15:07

## 2018-05-21 RX ADMIN — LEVETIRACETAM 500 MG: 500 TABLET, FILM COATED ORAL at 21:11

## 2018-05-21 RX ADMIN — IMMUNE GLOBULIN INFUSION (HUMAN) 20 G: 100 INJECTION, SOLUTION INTRAVENOUS; SUBCUTANEOUS at 11:38

## 2018-05-21 RX ADMIN — TRIMETHOBENZAMIDE HYDROCHLORIDE 300 MG: 300 CAPSULE ORAL at 21:11

## 2018-05-21 RX ADMIN — CYCLOSPORINE 1 DROP: 0.5 EMULSION OPHTHALMIC at 21:11

## 2018-05-21 RX ADMIN — PANTOPRAZOLE SODIUM 40 MG: 40 TABLET, DELAYED RELEASE ORAL at 07:14

## 2018-05-21 RX ADMIN — SUCRALFATE 1 G: 1 TABLET ORAL at 09:26

## 2018-05-21 RX ADMIN — PREGABALIN 75 MG: 75 CAPSULE ORAL at 21:11

## 2018-05-21 RX ADMIN — CLOTRIMAZOLE 10 MG: 10 LOZENGE ORAL at 07:14

## 2018-05-21 RX ADMIN — FOLIC ACID 1 MG: 1 TABLET ORAL at 09:26

## 2018-05-21 RX ADMIN — BUPROPION HYDROCHLORIDE 150 MG: 150 TABLET, EXTENDED RELEASE ORAL at 09:26

## 2018-05-21 RX ADMIN — RIFAXIMIN 550 MG: 550 TABLET ORAL at 09:26

## 2018-05-21 RX ADMIN — DIPHENHYDRAMINE HYDROCHLORIDE 25 MG: 25 CAPSULE ORAL at 10:28

## 2018-05-21 RX ADMIN — CLOTRIMAZOLE 10 MG: 10 LOZENGE ORAL at 13:09

## 2018-05-21 RX ADMIN — PREGABALIN 75 MG: 75 CAPSULE ORAL at 09:26

## 2018-05-21 RX ADMIN — TRIMETHOBENZAMIDE HYDROCHLORIDE 300 MG: 300 CAPSULE ORAL at 16:54

## 2018-05-21 RX ADMIN — OXYCODONE HYDROCHLORIDE 5 MG: 5 TABLET ORAL at 13:08

## 2018-05-21 RX ADMIN — INSULIN ASPART 5 UNITS: 100 INJECTION, SOLUTION INTRAVENOUS; SUBCUTANEOUS at 18:11

## 2018-05-21 RX ADMIN — INSULIN ASPART 38 UNITS: 100 INJECTION, SOLUTION INTRAVENOUS; SUBCUTANEOUS at 18:11

## 2018-05-21 RX ADMIN — SUCRALFATE 1 G: 1 TABLET ORAL at 21:11

## 2018-05-21 RX ADMIN — INSULIN DETEMIR 70 UNITS: 100 INJECTION, SOLUTION SUBCUTANEOUS at 07:15

## 2018-05-21 RX ADMIN — POTASSIUM CHLORIDE 20 MEQ: 750 CAPSULE, EXTENDED RELEASE ORAL at 02:30

## 2018-05-21 RX ADMIN — INSULIN ASPART 8 UNITS: 100 INJECTION, SOLUTION INTRAVENOUS; SUBCUTANEOUS at 09:28

## 2018-05-21 RX ADMIN — CLOTRIMAZOLE 10 MG: 10 LOZENGE ORAL at 18:11

## 2018-05-21 RX ADMIN — POLYETHYLENE GLYCOL 3350 17 G: 17 POWDER, FOR SOLUTION ORAL at 11:45

## 2018-05-21 RX ADMIN — FERROUS SULFATE TAB 325 MG (65 MG ELEMENTAL FE) 325 MG: 325 (65 FE) TAB at 09:26

## 2018-05-21 RX ADMIN — ALPRAZOLAM 0.5 MG: 0.5 TABLET ORAL at 23:07

## 2018-05-21 RX ADMIN — CLOTRIMAZOLE 10 MG: 10 LOZENGE ORAL at 21:21

## 2018-05-21 NOTE — PROGRESS NOTES
REASON FOR FOLLOWUP/CHIEF COMPLAINT:  ITP  1. 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   2. Evidence of B12 deficiency on her initial lab evaluation in April 2013.  Patient was started on parenteral B12 replacement.  The patient was switched from shots to daily oral B12 replacement after the visit of 12/02/2013.    3. Hemoglobin has significantly improved since banding of her esophageal varices and increase in her oral iron supplement to twice daily dosing.  4. Morphologically normal bone marrow from 08/20/2013 with normal flow cytometry.  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  Significance of this is uncertain.    5. Mixed connective tissue disorder, currently on Enbrel therapy.  6. Insulin-requiring diabetes mellitus.  7. Patient continues to follow-up periodically with the transplant team at [facility] but currently she is not on the transplant list.     HISTORY OF PRESENT ILLNESS:   Continues to deny bleeding.  No epistaxis since admission.  Some mild worsening of the bruising on her abdomen attributable to the subcutaneous injections with severe thrombocytopenia.  No other areas of bruising.    Arthritis much improved with high-dose steroids  Receiving day 5 of IV IgG platelets up to 20,000 very minimal increase so far      Past Medical History, Past Surgical History, Social History, Family History have been reviewed and are without significant changes except as mentioned.    Review of Systems   Review of Systems   Constitutional: Negative for activity change.   HENT: Negative for nosebleeds and trouble swallowing.    Respiratory: Negative for shortness of breath and wheezing.    Cardiovascular: Negative for chest pain and palpitations.   Gastrointestinal: Negative for constipation, diarrhea and nausea.   Genitourinary: Negative for dysuria and  "hematuria.   Musculoskeletal: Negative for arthralgias and myalgias.   Skin: Negative for rash and wound.   Neurological: Negative for seizures and syncope.   Hematological: Negative for adenopathy. Bruises/bleeds easily.   Psychiatric/Behavioral: Negative for confusion.       Medications:  The current medication list was reviewed in the EMR    ALLERGIES:    Allergies   Allergen Reactions   • Albuterol Anaphylaxis   • Lactulose Nausea And Vomiting and Other (See Comments)     Severe abdominal pain   • Tramadol Other (See Comments)     Per pt causes her \"palsy, meaning shaking and tremors\" and gi upset, nausea   • Quinine Derivatives Nausea And Vomiting              Vitals:    05/21/18 1138 05/21/18 1150 05/21/18 1220 05/21/18 1302   BP: 136/80 134/80 130/80 140/80   BP Location:    Right arm   Patient Position:    Lying   Pulse: 105 102 104 106   Resp:    16   Temp: 98 °F (36.7 °C)   98.2 °F (36.8 °C)   TempSrc:    Oral   SpO2:       Weight:       Height:         Physical Exam    CONSTITUTIONAL:  Vital signs reviewed.  No distress, looks comfortable.  EYES:  Conjunctiva and lids unremarkable.  PERRLA  EARS,NOSE,MOUTH,THROAT:  Ears and nose appear unremarkable.  Lips, teeth, gums appear unremarkable.  RESPIRATORY:  Normal respiratory effort.  Lungs clear to auscultation bilaterally.  CARDIOVASCULAR:  Normal S1, S2.  No murmurs rubs or gallops.  No significant lower extremity edema.  GASTROINTESTINAL: Abdomen appears unremarkable.  Nontender.  No hepatomegaly.  No splenomegaly.  NEURO: cranial nerves 2-12 grossly intact.  No focal deficits.  Appears to have equal strength all 4 extremities.  MUSCULOSKELETAL:  Unremarkable digits/nails.  No cyanosis or clubbing.  SKIN:  Warm.  No rashes.some bruising at the sites of subcutaneous injections in the abdomen which is progressing some.  PSYCHIATRIC:  Normal judgment and insight.  Normal mood and affect.       RECENT LABS:  WBC   Date Value Ref Range Status   05/21/2018 2.91 " (L) 4.50 - 10.70 10*3/mm3 Final   05/20/2018 4.29 (L) 4.50 - 10.70 10*3/mm3 Final   05/19/2018 2.95 (L) 4.50 - 10.70 10*3/mm3 Final     Hemoglobin   Date Value Ref Range Status   05/21/2018 9.5 (L) 11.9 - 15.5 g/dL Final   05/20/2018 10.6 (L) 11.9 - 15.5 g/dL Final   05/19/2018 9.9 (L) 11.9 - 15.5 g/dL Final     Platelets   Date Value Ref Range Status   05/21/2018 20 (C) 140 - 500 10*3/mm3 Final   05/20/2018 15 (C) 140 - 500 10*3/mm3 Final   05/19/2018 5 (C) 140 - 500 10*3/mm3 Final     Lab Results   Component Value Date    NEUTROABS 1.84 (L) 05/21/2018                   ASSESSMENT/PLAN:     *ITP.  Appears to be most consistent with ITP.  Peripheral smear shows no significant amount of schistocytes and no platelet clumping.  IPF elevated at 18.4%, consistent with a destructive etiology of thrombocytopenia.  Unremarkable: Folate, B12, fibrinogen, PTT.  Did not respond to platelet transfusion, also consistent with a destructive process.    Her only new medicine is Tigan, started mid April 2018 for nausea from gastroparesis.  Upon my review on Micromedex, blood dyscrasias have been reported, but no mention of immune mediated thrombocytopenia.  I doubt this is the cause of the platelet drop.  PLT unchanged this morning, but this is not surprising as this is only day 3 of prednisone and IVIG.   PLT Improved to 20 today from 15 yesterday.    *Mild elevation in INR at 1.26.  Suspect this is due to cirrhosis.     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  WBC improved from 2 up to 3, then again up to 4.3 today.  Hb better today, up to 10.6 from 9.9     *B12 deficiency.  On oral B12 since December 2013.     *History of esophageal varices banding.     *Iron deficiency anemia due to GI bleeding.  Has been on oral iron twice per day.  Ferritin 51 with 10% saturation.  This suggests iron deficiency.  300 mg Venofer daily day 2 of 2.     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  This is of unknown significance.     *Rheumatoid arthritis.  Reports she hasn't been unable to take Enbrel since December due to persistent fevers.     *Lupus     *Diabetes.  Hyperglycemia from steroids may be a problem.  Defer to hospitalist.     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     *Fever of up to 101 for a couple months.  She was trying to have a 48 hour time period with no fever associated started back on Enbrel.  Dr. Huertas infectious diseases is now following peripherally.  He does not feel there is an infectious etiology of the fever.  CT chest 5/17/18 negative for an etiology fever.  It did have changes suggestive of pancreatitis.    Last fever 100.4 the evening of 5/16/18.  Afebrile since.  (Has been on high-dose prednisone).     Plan  · Continue prednisone 75 mg daily  Day 5  · IVIG 400 mg/kg daily day 5 of 5 days  · Venofer 300 mg daily givenx2   · Defer expected hyperglycemia from steroids to hospitalist/endocrinology services.  · She usually sees Dr. Mcdowell in our office.

## 2018-05-21 NOTE — PROGRESS NOTES
Continued Stay Note   [facility]     Patient Name: Silvia Zabala  MRN: [id number]  Today's Date: 5/21/2018    Admit Date: 5/16/2018          Discharge Plan     Row Name 05/21/18 0920       Plan    Plan Comments Pt has requested case management services for after care from [place]  Pt to call case management department at [place]  [phone] ext [phone]  [place].  Pt denies needs Plan is home               Discharge Codes    No documentation.           Amber Mike RN

## 2018-05-21 NOTE — PLAN OF CARE
Problem: Patient Care Overview  Goal: Plan of Care Review  Outcome: Ongoing (interventions implemented as appropriate)   05/21/18 9587   Coping/Psychosocial   Plan of Care Reviewed With patient   Plan of Care Review   Progress improving   OTHER   Outcome Summary Blood glucoses per MAR and Insulin adjusted per Dr. Burnett. Appetite better this pm. Labs noted. Phenergan for nausea x 1 today. Monitor labs, blood glucose and vital signs.      Goal: Individualization and Mutuality  Outcome: Ongoing (interventions implemented as appropriate)    Goal: Discharge Needs Assessment  Outcome: Ongoing (interventions implemented as appropriate)      Problem: Fall Risk (Adult)  Goal: Absence of Fall  Outcome: Ongoing (interventions implemented as appropriate)      Problem: Liver Failure, Acute/Chronic (Adult)  Goal: Signs and Symptoms of Listed Potential Problems Will be Absent, Minimized or Managed (Liver Failure, Acute/Chronic)  Outcome: Ongoing (interventions implemented as appropriate)

## 2018-05-21 NOTE — PLAN OF CARE
Problem: Patient Care Overview  Goal: Plan of Care Review  Outcome: Ongoing (interventions implemented as appropriate)   05/21/18 0607   Coping/Psychosocial   Plan of Care Reviewed With patient   Plan of Care Review   Progress improving   OTHER   Outcome Summary Safety maintained, Blood sugars noted, poor po yesterday r/t gastroparesis and nausea, some relief noted after po phenergan and able to tolerate HS snack.      Goal: Individualization and Mutuality  Outcome: Ongoing (interventions implemented as appropriate)    Goal: Discharge Needs Assessment  Outcome: Ongoing (interventions implemented as appropriate)      Problem: Fall Risk (Adult)  Goal: Absence of Fall  Outcome: Ongoing (interventions implemented as appropriate)

## 2018-05-21 NOTE — PROGRESS NOTES
[facility]     LOS: 5 days     Subjective:  No cp currently   no soa  Eating some not great  4 bm yesterday and some slightly loose    Objective:    Vital Signs:  Temp:  [98 °F (36.7 °C)-98.9 °F (37.2 °C)] 98 °F (36.7 °C)  Heart Rate:  [] 105  Resp:  [16-20] 18  BP: (134-155)/(73-95) 136/80       on   ;   Device (Oxygen Therapy): room air  Body mass index is 26.97 kg/m².    Physical Exam   Constitutional: She appears well-developed and well-nourished.   HENT:   Head: Normocephalic and atraumatic.   Cardiovascular: Normal rate and regular rhythm.    No murmur heard.  Pulmonary/Chest: Effort normal and breath sounds normal.   Abdominal: Soft. Bowel sounds are normal. She exhibits no distension. There is no tenderness.   Neurological: She is alert.   Skin: Skin is warm and dry.   Bruising in the antecubital fossas        Results Review:    Glucose   Date Value Ref Range Status   05/21/2018 360 (H) 65 - 99 mg/dL Final   05/20/2018 53 (L) 65 - 99 mg/dL Final   05/19/2018 111 (H) 65 - 99 mg/dL Final       Results from last 7 days  Lab Units 05/21/18 0332   WBC 10*3/mm3 2.91*   HEMOGLOBIN g/dL 9.5*   HEMATOCRIT % 30.2*   PLATELETS 10*3/mm3 20*       Results from last 7 days  Lab Units 05/21/18  0332 05/16/18 1943   SODIUM mmol/L 132*  < > 140   POTASSIUM mmol/L 4.2  < > 4.3   CHLORIDE mmol/L 97*  < > 101   CO2 mmol/L 21.3*  < > 24.4   BUN mg/dL 16  < > 6   CREATININE mg/dL 0.88  < > 0.77   CALCIUM mg/dL 9.0  < > 8.7   BILIRUBIN mg/dL  --   --  0.8   ALK PHOS U/L  --   --  125*   ALT (SGPT) U/L  --   --  23   AST (SGOT) U/L  --   --  45*   GLUCOSE mg/dL 360*  < > 347*   < > = values in this interval not displayed.    Results from last 7 days  Lab Units 05/16/18  1943   INR  1.26*   APTT seconds 30.2           Results from last 7 days  Lab Units 05/17/18  0049   TROPONIN T ng/mL <0.010     Cultures:  Blood Culture   Date Value Ref Range Status   05/17/2018 No growth at 3 days   Preliminary   05/17/2018 No growth at 3 days  Preliminary       I have reviewed daily medications and changes in CPOE    Scheduled meds    buPROPion  mg Oral Daily   clotrimazole 10 mg Oral 5x Daily   cycloSPORINE 1 drop Both Eyes BID   diphenhydrAMINE 25 mg Oral Daily   DULoxetine 90 mg Oral Daily   ferrous sulfate 325 mg Oral BID   folic acid 1 mg Oral Daily   immune globulin (human) 10 g Intravenous Daily   immune globulin (human) 20 g Intravenous Daily   insulin aspart 0-14 Units Subcutaneous 4x Daily With Meals & Nightly   insulin aspart 30 Units Subcutaneous TID With Meals   insulin detemir 25 Units Subcutaneous Nightly   insulin detemir 70 Units Subcutaneous QAM   levETIRAcetam 500 mg Oral BID   Methylnaltrexone Bromide 450 mg Oral Daily   pantoprazole 40 mg Oral Q AM   polyethylene glycol 17 g Oral Daily   predniSONE 75 mg Oral Daily With Breakfast   pregabalin 75 mg Oral BID   rifaximin 550 mg Oral BID   spironolactone 100 mg Oral Daily   sucralfate 1 g Oral BID   trimethobenzamide 300 mg Oral TID          PRN meds  •  acetaminophen  •  ALPRAZolam  •  cyclobenzaprine  •  dextrose  •  dextrose  •  glucagon (human recombinant)  •  hydrOXYzine  •  oxyCODONE  •  promethazine  •  sodium chloride  •  Insert peripheral IV **AND** sodium chloride      Principal Problem:    Acute ITP  Active Problems:    Cirrhosis of liver    Rheumatoid arthritis    Anxiety and depression    Type 2 diabetes mellitus, uncontrolled, with neuropathy    DUKES (nonalcoholic steatohepatitis)    Hematemesis    Systemic lupus    Secondary esophageal varices without bleeding    Fever        Assessment/Plan:      Acute ITP-plts better, IVIG, prednisone 75  -aspart sc 78  -levemir 70      Cirrhosis of liver      Rheumatoid arthritis  -followed by Dr Munoz  -trying to get back on embrel but having fevers      Anxiety and depression      Type 2 diabetes mellitus, uncontrolled, with neuropathy  -endocrinology is following      DUKES  (nonalcoholic steatohepatitis)      Hematemesis- none since admission      Systemic lupus      Secondary esophageal varices without bleeding      Fever-temp curve is normal during the hospitalization      Jose Almanza MD  05/21/18  12:02 PM

## 2018-05-21 NOTE — PROGRESS NOTES
57 y.o.   LOS: 5 days   Patient Care Team:  Blanca Pitts MD as PCP - General (Internal Medicine)  Sukhdeep Mcdowell MD as Consulting Physician (Hematology and Oncology)  Blanca Pitts MD as Referring Physician (Internal Medicine)    Chief Complaint: Hyperglycemia    Chief Complaint   Patient presents with   • Dizziness   • Nose Bleed       Subjective  patient continues to have significantly elevated blood sugars.  She still reports of some nausea and poor by mouth intake.  She remains on prednisone 75 mg oral daily which is resulting in her blood sugars should be significantly elevated.      Interval History:    Review of Systems:   Review of Systems   Constitutional: Positive for appetite change and fatigue. Negative for fever.   Respiratory: Negative for shortness of breath.    Cardiovascular: Negative for chest pain.   Gastrointestinal: Positive for nausea. Negative for diarrhea and vomiting.   Endocrine: Negative for polydipsia and polyuria.   Genitourinary: Negative for flank pain.   Musculoskeletal: Negative for arthralgias and myalgias.   Skin: Positive for pallor.   Neurological: Negative for weakness and numbness.   Psychiatric/Behavioral: Negative for agitation.     Objective     Vital Signs   Temp:  [98 °F (36.7 °C)-98.9 °F (37.2 °C)] 98.2 °F (36.8 °C)  Heart Rate:  [] 115  Resp:  [16-18] 16  BP: (130-155)/(73-95) 140/87    Physical Exam:  Physical Exam   Constitutional: She is oriented to person, place, and time. She appears well-nourished.   Obese     HENT:   Head: Normocephalic and atraumatic.   Wide neck   Eyes: Conjunctivae and EOM are normal. No scleral icterus.   Neck: Normal range of motion. Neck supple. No thyromegaly present.   Acanthosis nigricans   Cardiovascular: Normal rate and normal heart sounds.    Pulmonary/Chest: Effort normal and breath sounds normal. No stridor. She has no wheezes.   Abdominal: Soft. Bowel sounds are normal. She exhibits distension. There is tenderness.    Central obesity   Musculoskeletal: She exhibits no edema or tenderness.   Neurological: She is alert and oriented to person, place, and time.   Skin: Skin is warm and dry. She is not diaphoretic.   Psychiatric: She has a normal mood and affect.   Vitals reviewed.  Results Review:     I reviewed the patient's new clinical results.      Glucose   Date/Time Value Ref Range Status   05/21/2018 0332 360 (H) 65 - 99 mg/dL Final   05/20/2018 0457 53 (L) 65 - 99 mg/dL Final   05/19/2018 0450 111 (H) 65 - 99 mg/dL Final     Lab Results (last 24 hours)     Procedure Component Value Units Date/Time    POC Glucose Once [[id number]]  (Abnormal) Collected:  05/21/18 1120    Specimen:  Blood Updated:  05/21/18 1121     Glucose 310 (H) mg/dL     Narrative:       Meter: [id number] : [id number] Nevaeh JOE    POC Glucose Once [[id number]]  (Abnormal) Collected:  05/21/18 0621    Specimen:  Blood Updated:  05/21/18 0622     Glucose 296 (H) mg/dL     Narrative:       Meter: [id number] : [id number] Conner Fagan NA    Basic Metabolic Panel [[id number]]  (Abnormal) Collected:  05/21/18 0332    Specimen:  Blood Updated:  05/21/18 0442     Glucose 360 (H) mg/dL      BUN 16 mg/dL      Creatinine 0.88 mg/dL      Sodium 132 (L) mmol/L      Potassium 4.2 mmol/L      Chloride 97 (L) mmol/L      CO2 21.3 (L) mmol/L      Calcium 9.0 mg/dL      eGFR Non African Amer 67 mL/min/1.73      BUN/Creatinine Ratio 18.2     Anion Gap 13.7 mmol/L     Narrative:       GFR Normal >60  Chronic Kidney Disease <60  Kidney Failure <15    CBC & Differential [[id number]] Collected:  05/21/18 0332    Specimen:  Blood Updated:  05/21/18 0434    Narrative:       The following orders were created for panel order CBC & Differential.  Procedure                               Abnormality         Status                     ---------                               -----------         ------                     Scan Slide[[id number]]                                                                   CBC Auto Differential[[id number]]        Abnormal            Final result                 Please view results for these tests on the individual orders.    CBC Auto Differential [[id number]]  (Abnormal) Collected:  05/21/18 0332    Specimen:  Blood Updated:  05/21/18 0434     WBC 2.91 (L) 10*3/mm3      RBC 3.43 (L) 10*6/mm3      Hemoglobin 9.5 (L) g/dL      Hematocrit 30.2 (L) %      MCV 88.0 fL      MCH 27.7 pg      MCHC 31.5 (L) g/dL      RDW 16.1 (H) %      RDW-SD 51.7 fl      MPV -- fL      Comment: .         Platelets 20 (C) 10*3/mm3      Neutrophil % 63.3 %      Lymphocyte % 21.6 %      Monocyte % 14.1 (H) %      Eosinophil % 0.3 %      Basophil % 0.7 %      Immature Grans % 4.1 (H) %      Neutrophils, Absolute 1.84 (L) 10*3/mm3      Lymphocytes, Absolute 0.63 (L) 10*3/mm3      Monocytes, Absolute 0.41 10*3/mm3      Eosinophils, Absolute 0.01 10*3/mm3      Basophils, Absolute 0.02 10*3/mm3      Immature Grans, Absolute 0.12 (H) 10*3/mm3      nRBC 0.9 (H) /100 WBC     Blood Culture - Blood, [[id number]]  (Normal) Collected:  05/17/18 0054    Specimen:  Blood from Arm, Left Updated:  05/21/18 0100     Blood Culture No growth at 4 days    Blood Culture - Blood, [[id number]]  (Normal) Collected:  05/17/18 0049    Specimen:  Blood from Arm, Right Updated:  05/21/18 0100     Blood Culture No growth at 4 days    POC Glucose Once [[id number]]  (Abnormal) Collected:  05/20/18 2024    Specimen:  Blood Updated:  05/20/18 2026     Glucose 390 (H) mg/dL     Narrative:       Meter: [id number] : [id number] Conner JOE    POC Glucose Once [[id number]]  (Abnormal) Collected:  05/20/18 1606    Specimen:  Blood Updated:  05/20/18 1608     Glucose 210 (H) mg/dL     Narrative:       Meter: [id number] : [id number] Mil JOE        Imaging Results (last 24 hours)     ** No results found for the last 24 hours. **          Medication Review: done      Current Facility-Administered  Medications:   •  acetaminophen (TYLENOL) tablet 650 mg, 650 mg, Oral, Q4H PRN, Jose Almanza MD  •  ALPRAZolam (XANAX) tablet 0.5 mg, 0.5 mg, Oral, BID PRN, Jose Almanza MD, 0.5 mg at 05/20/18 2251  •  buPROPion XL (WELLBUTRIN XL) 24 hr tablet 150 mg, 150 mg, Oral, Daily, Jose Almanza MD, 150 mg at 05/21/18 0926  •  clotrimazole (MYCELEX) omer 10 mg, 10 mg, Oral, 5x Daily, Jose Almanza MD, 10 mg at 05/21/18 1309  •  cyclobenzaprine (FLEXERIL) tablet 5 mg, 5 mg, Oral, TID PRN, Jose Almanza MD, 5 mg at 05/17/18 0150  •  cycloSPORINE (RESTASIS) 0.05 % ophthalmic emulsion 1 drop, 1 drop, Both Eyes, BID, Jose Almanza MD, 1 drop at 05/21/18 0926  •  dextrose (D50W) solution 25 g, 25 g, Intravenous, Q15 Min PRN, Jose Almanza MD, 25 g at 05/20/18 0513  •  dextrose (GLUTOSE) oral gel 15 g, 15 g, Oral, Q15 Min PRN, Jose Almanza MD  •  diphenhydrAMINE (BENADRYL) capsule 25 mg, 25 mg, Oral, Daily, Gerson Olson II, MD, 25 mg at 05/21/18 1028  •  DULoxetine (CYMBALTA) DR capsule 90 mg, 90 mg, Oral, Daily, Jose Almanza MD, 90 mg at 05/21/18 0926  •  ferrous sulfate tablet 325 mg, 325 mg, Oral, BID, Jose Almanza MD, 325 mg at 05/21/18 0926  •  folic acid (FOLVITE) tablet 1 mg, 1 mg, Oral, Daily, Jose Almanza MD, 1 mg at 05/21/18 0926  •  glucagon (human recombinant) (GLUCAGEN DIAGNOSTIC) injection 1 mg, 1 mg, Subcutaneous, PRN, Jose Almanza MD  •  hydrOXYzine (ATARAX) tablet 25 mg, 25 mg, Oral, TID PRN, Jose Almanza MD  •  immune globulin (human) (GAMMAGARD) infusion 10 g, 10 g, Intravenous, Daily, Gerson Olson II, MD, 10 g at 05/21/18 1458  •  immune globulin (human) (GAMMAGARD) infusion 20 g, 20 g, Intravenous, Daily, Gerson Olson II, MD, 20 g at 05/21/18 1138  •  insulin aspart (novoLOG) injection 0-14 Units, 0-14 Units, Subcutaneous, 4x Daily With Meals & Nightly, Jose Almanza MD, 10 Units at 05/21/18 1149  •  insulin aspart (novoLOG) injection 38 Units,  38 Units, Subcutaneous, TID With Meals, Merary Burnett MD  •  insulin detemir (LEVEMIR) injection 28 Units, 28 Units, Subcutaneous, Nightly, Merary Burnett MD  •  [START ON 5/22/2018] insulin detemir (LEVEMIR) injection 80 Units, 80 Units, Subcutaneous, QAM, Merary Burnett MD  •  levETIRAcetam (KEPPRA) tablet 500 mg, 500 mg, Oral, BID, Jose Almanza MD, 500 mg at 05/21/18 0926  •  Methylnaltrexone Bromide tablet 450 mg, 450 mg, Oral, Daily, Jose Almanza MD, 450 mg at 05/21/18 0927  •  oxyCODONE (ROXICODONE) immediate release tablet 5 mg, 5 mg, Oral, Q6H PRN, Jose Almanza MD, 5 mg at 05/21/18 1308  •  pantoprazole (PROTONIX) EC tablet 40 mg, 40 mg, Oral, Q AM, Wesly Walsh MD, 40 mg at 05/21/18 0714  •  polyethylene glycol 3350 powder (packet), 17 g, Oral, Daily, Wesly Walsh MD, 17 g at 05/21/18 1145  •  predniSONE (DELTASONE) tablet 75 mg, 75 mg, Oral, Daily With Breakfast, Gerson Olson II, MD, 75 mg at 05/21/18 0926  •  pregabalin (LYRICA) capsule 75 mg, 75 mg, Oral, BID, Jose Almanza MD, 75 mg at 05/21/18 0926  •  promethazine (PHENERGAN) tablet 25 mg, 25 mg, Oral, Q6H PRN, Jose Almanza MD, 25 mg at 05/21/18 1507  •  rifaximin (XIFAXAN) tablet 550 mg, 550 mg, Oral, BID, Jose Almanza MD, 550 mg at 05/21/18 0926  •  sodium chloride 0.9 % flush 1-10 mL, 1-10 mL, Intravenous, PRN, Jose Almanza MD, 10 mL at 05/19/18 2761  •  Insert peripheral IV, , , Once **AND** sodium chloride 0.9 % flush 10 mL, 10 mL, Intravenous, PRN, Mauro Moore MD  •  spironolactone (ALDACTONE) tablet 100 mg, 100 mg, Oral, Daily, Jose Almanza MD, 100 mg at 05/21/18 0927  •  sucralfate (CARAFATE) tablet 1 g, 1 g, Oral, BID, Jose Almanza MD, 1 g at 05/21/18 0926  •  trimethobenzamide (TIGAN) capsule 300 mg, 300 mg, Oral, TID, Jose Almanza MD, 300 mg at 05/21/18 0926    Assessment/Plan     Active Hospital Problems (** Indicates Principal Problem)    Diagnosis Date  "Noted   • **Acute ITP [D69.3] 05/18/2018   • Fever [R50.9] 05/17/2018   • Secondary esophageal varices without bleeding [I85.10] 02/22/2017   • Systemic lupus [M32.9] 02/22/2017   • Hematemesis [K92.0] 02/15/2017   • DUKES (nonalcoholic steatohepatitis) [K75.81] 06/14/2016   • Type 2 diabetes mellitus, uncontrolled, with neuropathy [E11.40, E11.65] 03/15/2016   • Cirrhosis of liver [K74.60] 03/08/2016   • Rheumatoid arthritis [M06.9] 03/08/2016   • Anxiety and depression [F41.8] 03/08/2016      Resolved Hospital Problems    Diagnosis Date Noted Date Resolved   • Thrombocytopenia [D69.6] 05/16/2018 05/18/2018   • Secondary esophageal varices with bleeding [I85.11] 03/07/2017 05/17/2018       Type 2 diabetes mellitus-significantly uncontrolled  Blood sugars are severely elevated  Will increase levemir to 80 units in the morning  Will increase levemir to 28 units at bedtime  Will increase NovoLog to 38 units with each meal along with holding parameters  Continue NovoLog high dose sliding scale.    Hyperglycemia secondary to steroids  Currently on 75 mg oral daily of prednisone  Blood sugars could improve with the steroid taper.  Will continue to manage the blood sugars while patient is on a very high dosage of prednisone.    Merary Burnett MD.  05/21/18  3:24 PM      EMR Dragon / transcription disclaimer:    \"Dictated utilizing Dragon dictation\".   "

## 2018-05-22 LAB
ANION GAP SERPL CALCULATED.3IONS-SCNC: 12.2 MMOL/L
ANISOCYTOSIS BLD QL: ABNORMAL
BACTERIA SPEC AEROBE CULT: NORMAL
BACTERIA SPEC AEROBE CULT: NORMAL
BUN BLD-MCNC: 17 MG/DL (ref 6–20)
BUN/CREAT SERPL: 21.3 (ref 7–25)
CALCIUM SPEC-SCNC: 9.3 MG/DL (ref 8.6–10.5)
CHLORIDE SERPL-SCNC: 102 MMOL/L (ref 98–107)
CO2 SERPL-SCNC: 23.8 MMOL/L (ref 22–29)
CREAT BLD-MCNC: 0.8 MG/DL (ref 0.57–1)
DEPRECATED RDW RBC AUTO: 52.4 FL (ref 37–54)
ERYTHROCYTE [DISTWIDTH] IN BLOOD BY AUTOMATED COUNT: 16.5 % (ref 11.7–13)
GFR SERPL CREATININE-BSD FRML MDRD: 74 ML/MIN/1.73
GLUCOSE BLD-MCNC: 99 MG/DL (ref 65–99)
GLUCOSE BLDC GLUCOMTR-MCNC: 121 MG/DL (ref 70–130)
GLUCOSE BLDC GLUCOMTR-MCNC: 345 MG/DL (ref 70–130)
GLUCOSE BLDC GLUCOMTR-MCNC: 398 MG/DL (ref 70–130)
GLUCOSE BLDC GLUCOMTR-MCNC: 438 MG/DL (ref 70–130)
HCT VFR BLD AUTO: 32.9 % (ref 35.6–45.5)
HGB BLD-MCNC: 10.3 G/DL (ref 11.9–15.5)
LYMPHOCYTES # BLD MANUAL: 0.77 10*3/MM3 (ref 0.9–4.8)
LYMPHOCYTES NFR BLD MANUAL: 24 % (ref 19.6–45.3)
LYMPHOCYTES NFR BLD MANUAL: 3 % (ref 5–12)
MCH RBC QN AUTO: 27.6 PG (ref 26.9–32)
MCHC RBC AUTO-ENTMCNC: 31.3 G/DL (ref 32.4–36.3)
MCV RBC AUTO: 88.2 FL (ref 80.5–98.2)
MONOCYTES # BLD AUTO: 0.1 10*3/MM3 (ref 0.2–1.2)
MYELOCYTES NFR BLD MANUAL: 2 % (ref 0–0)
NEUTROPHILS # BLD AUTO: 2.28 10*3/MM3 (ref 1.9–8.1)
NEUTROPHILS NFR BLD MANUAL: 71 % (ref 42.7–76)
PLAT MORPH BLD: NORMAL
PLATELET # BLD AUTO: 40 10*3/MM3 (ref 140–500)
PMV BLD AUTO: ABNORMAL FL (ref 6–12)
POTASSIUM BLD-SCNC: 4.1 MMOL/L (ref 3.5–5.2)
RBC # BLD AUTO: 3.73 10*6/MM3 (ref 3.9–5.2)
RESULT: 310 PG/ML
SCAN SLIDE: NORMAL
SODIUM BLD-SCNC: 138 MMOL/L (ref 136–145)
WBC MORPH BLD: NORMAL
WBC NRBC COR # BLD: 3.21 10*3/MM3 (ref 4.5–10.7)

## 2018-05-22 PROCEDURE — 85007 BL SMEAR W/DIFF WBC COUNT: CPT | Performed by: INTERNAL MEDICINE

## 2018-05-22 PROCEDURE — 63710000001 PREDNISONE PER 1 MG: Performed by: INTERNAL MEDICINE

## 2018-05-22 PROCEDURE — 85025 COMPLETE CBC W/AUTO DIFF WBC: CPT | Performed by: INTERNAL MEDICINE

## 2018-05-22 PROCEDURE — 25010000002 IMMUNE GLOBULIN (HUMAN) 10 GM/100ML SOLUTION: Performed by: INTERNAL MEDICINE

## 2018-05-22 PROCEDURE — 82962 GLUCOSE BLOOD TEST: CPT

## 2018-05-22 PROCEDURE — 63710000001 INSULIN ASPART PER 5 UNITS: Performed by: INTERNAL MEDICINE

## 2018-05-22 PROCEDURE — 99232 SBSQ HOSP IP/OBS MODERATE 35: CPT | Performed by: INTERNAL MEDICINE

## 2018-05-22 PROCEDURE — 99233 SBSQ HOSP IP/OBS HIGH 50: CPT | Performed by: INTERNAL MEDICINE

## 2018-05-22 PROCEDURE — 63710000001 PROMETHAZINE PER 25 MG: Performed by: HOSPITALIST

## 2018-05-22 PROCEDURE — 63710000001 INSULIN DETEMER PER 5 UNITS: Performed by: INTERNAL MEDICINE

## 2018-05-22 PROCEDURE — 25010000002 IMMUNE GLOBULIN (HUMAN) 20 GM/200ML SOLUTION: Performed by: INTERNAL MEDICINE

## 2018-05-22 PROCEDURE — 63710000001 INSULIN ASPART PER 5 UNITS: Performed by: HOSPITALIST

## 2018-05-22 PROCEDURE — 63710000001 PREDNISONE PER 5 MG: Performed by: INTERNAL MEDICINE

## 2018-05-22 PROCEDURE — 63710000001 DIPHENHYDRAMINE PER 50 MG: Performed by: INTERNAL MEDICINE

## 2018-05-22 PROCEDURE — 80048 BASIC METABOLIC PNL TOTAL CA: CPT | Performed by: HOSPITALIST

## 2018-05-22 RX ADMIN — INSULIN ASPART 12 UNITS: 100 INJECTION, SOLUTION INTRAVENOUS; SUBCUTANEOUS at 21:06

## 2018-05-22 RX ADMIN — PANTOPRAZOLE SODIUM 40 MG: 40 TABLET, DELAYED RELEASE ORAL at 06:57

## 2018-05-22 RX ADMIN — INSULIN ASPART 38 UNITS: 100 INJECTION, SOLUTION INTRAVENOUS; SUBCUTANEOUS at 17:20

## 2018-05-22 RX ADMIN — PREGABALIN 75 MG: 75 CAPSULE ORAL at 21:07

## 2018-05-22 RX ADMIN — RIFAXIMIN 550 MG: 550 TABLET ORAL at 21:07

## 2018-05-22 RX ADMIN — CLOTRIMAZOLE 10 MG: 10 LOZENGE ORAL at 06:57

## 2018-05-22 RX ADMIN — PREDNISONE 75 MG: 5 TABLET ORAL at 08:49

## 2018-05-22 RX ADMIN — PROMETHAZINE HYDROCHLORIDE 25 MG: 25 TABLET ORAL at 23:39

## 2018-05-22 RX ADMIN — CLOTRIMAZOLE 10 MG: 10 LOZENGE ORAL at 21:08

## 2018-05-22 RX ADMIN — TRIMETHOBENZAMIDE HYDROCHLORIDE 300 MG: 300 CAPSULE ORAL at 21:07

## 2018-05-22 RX ADMIN — OXYCODONE HYDROCHLORIDE 5 MG: 5 TABLET ORAL at 06:57

## 2018-05-22 RX ADMIN — OXYCODONE HYDROCHLORIDE 5 MG: 5 TABLET ORAL at 15:37

## 2018-05-22 RX ADMIN — INSULIN ASPART 10 UNITS: 100 INJECTION, SOLUTION INTRAVENOUS; SUBCUTANEOUS at 17:19

## 2018-05-22 RX ADMIN — PREGABALIN 75 MG: 75 CAPSULE ORAL at 08:50

## 2018-05-22 RX ADMIN — IMMUNE GLOBULIN INFUSION (HUMAN) 10 G: 100 INJECTION, SOLUTION INTRAVENOUS; SUBCUTANEOUS at 09:52

## 2018-05-22 RX ADMIN — CYCLOSPORINE 1 DROP: 0.5 EMULSION OPHTHALMIC at 21:07

## 2018-05-22 RX ADMIN — PROMETHAZINE HYDROCHLORIDE 25 MG: 25 TABLET ORAL at 15:37

## 2018-05-22 RX ADMIN — TRIMETHOBENZAMIDE HYDROCHLORIDE 300 MG: 300 CAPSULE ORAL at 08:50

## 2018-05-22 RX ADMIN — TRIMETHOBENZAMIDE HYDROCHLORIDE 300 MG: 300 CAPSULE ORAL at 15:37

## 2018-05-22 RX ADMIN — FERROUS SULFATE TAB 325 MG (65 MG ELEMENTAL FE) 325 MG: 325 (65 FE) TAB at 08:51

## 2018-05-22 RX ADMIN — CLOTRIMAZOLE 10 MG: 10 LOZENGE ORAL at 11:33

## 2018-05-22 RX ADMIN — FOLIC ACID 1 MG: 1 TABLET ORAL at 08:51

## 2018-05-22 RX ADMIN — DULOXETINE HYDROCHLORIDE 90 MG: 60 CAPSULE, DELAYED RELEASE ORAL at 08:51

## 2018-05-22 RX ADMIN — SPIRONOLACTONE 100 MG: 100 TABLET ORAL at 08:51

## 2018-05-22 RX ADMIN — CYCLOSPORINE 1 DROP: 0.5 EMULSION OPHTHALMIC at 08:52

## 2018-05-22 RX ADMIN — CLOTRIMAZOLE 10 MG: 10 LOZENGE ORAL at 17:18

## 2018-05-22 RX ADMIN — BUPROPION HYDROCHLORIDE 150 MG: 150 TABLET, EXTENDED RELEASE ORAL at 08:51

## 2018-05-22 RX ADMIN — IMMUNE GLOBULIN INFUSION (HUMAN) 20 G: 100 INJECTION, SOLUTION INTRAVENOUS; SUBCUTANEOUS at 12:23

## 2018-05-22 RX ADMIN — RIFAXIMIN 550 MG: 550 TABLET ORAL at 08:51

## 2018-05-22 RX ADMIN — FERROUS SULFATE TAB 325 MG (65 MG ELEMENTAL FE) 325 MG: 325 (65 FE) TAB at 21:07

## 2018-05-22 RX ADMIN — INSULIN ASPART 38 UNITS: 100 INJECTION, SOLUTION INTRAVENOUS; SUBCUTANEOUS at 11:34

## 2018-05-22 RX ADMIN — CLOTRIMAZOLE 10 MG: 10 LOZENGE ORAL at 14:45

## 2018-05-22 RX ADMIN — INSULIN ASPART 25 UNITS: 100 INJECTION, SOLUTION INTRAVENOUS; SUBCUTANEOUS at 08:44

## 2018-05-22 RX ADMIN — LEVETIRACETAM 500 MG: 500 TABLET, FILM COATED ORAL at 08:50

## 2018-05-22 RX ADMIN — SUCRALFATE 1 G: 1 TABLET ORAL at 21:07

## 2018-05-22 RX ADMIN — ALPRAZOLAM 0.5 MG: 0.5 TABLET ORAL at 17:26

## 2018-05-22 RX ADMIN — ALPRAZOLAM 0.5 MG: 0.5 TABLET ORAL at 23:39

## 2018-05-22 RX ADMIN — OXYCODONE HYDROCHLORIDE 5 MG: 5 TABLET ORAL at 23:43

## 2018-05-22 RX ADMIN — INSULIN DETEMIR 15 UNITS: 100 INJECTION, SOLUTION SUBCUTANEOUS at 17:18

## 2018-05-22 RX ADMIN — SUCRALFATE 1 G: 1 TABLET ORAL at 08:50

## 2018-05-22 RX ADMIN — DIPHENHYDRAMINE HYDROCHLORIDE 25 MG: 25 CAPSULE ORAL at 09:44

## 2018-05-22 RX ADMIN — ACETAMINOPHEN 650 MG: 325 TABLET ORAL at 09:43

## 2018-05-22 RX ADMIN — LEVETIRACETAM 500 MG: 500 TABLET, FILM COATED ORAL at 21:07

## 2018-05-22 RX ADMIN — INSULIN ASPART 14 UNITS: 100 INJECTION, SOLUTION INTRAVENOUS; SUBCUTANEOUS at 11:34

## 2018-05-22 NOTE — PROGRESS NOTES
[facility]     LOS: 6 days     Subjective:  No cp currently  No soa    Chronic pain in hand worse and shoulders  Eating not great but trying    Loose BM last night    Headaches about the same    Objective:    Vital Signs:  Temp:  [98 °F (36.7 °C)-98.4 °F (36.9 °C)] 98.1 °F (36.7 °C)  Heart Rate:  [] 98  Resp:  [16-20] 20  BP: (129-149)/(78-88) 136/82    SpO2:  [97 %-99 %] 97 %  on   ;   Device (Oxygen Therapy): room air  Body mass index is 26.97 kg/m².    Physical Exam   Constitutional: She appears well-developed and well-nourished.   HENT:   Head: Normocephalic and atraumatic.   Cardiovascular: Normal rate and regular rhythm.    No murmur heard.  Pulmonary/Chest: Effort normal and breath sounds normal.   Abdominal: Soft. Bowel sounds are normal. She exhibits no distension. There is no tenderness.   Neurological: She is alert.   Skin: Skin is warm and dry.   Bruising in the antecubital fossas        Results Review:    Glucose   Date Value Ref Range Status   05/22/2018 99 65 - 99 mg/dL Final   05/21/2018 360 (H) 65 - 99 mg/dL Final   05/20/2018 53 (L) 65 - 99 mg/dL Final       Results from last 7 days  Lab Units 05/22/18  0452   WBC 10*3/mm3 3.21*   HEMOGLOBIN g/dL 10.3*   HEMATOCRIT % 32.9*   PLATELETS 10*3/mm3 40*       Results from last 7 days  Lab Units 05/22/18  0452  05/16/18  1943   SODIUM mmol/L 138  < > 140   POTASSIUM mmol/L 4.1  < > 4.3   CHLORIDE mmol/L 102  < > 101   CO2 mmol/L 23.8  < > 24.4   BUN mg/dL 17  < > 6   CREATININE mg/dL 0.80  < > 0.77   CALCIUM mg/dL 9.3  < > 8.7   BILIRUBIN mg/dL  --   --  0.8   ALK PHOS U/L  --   --  125*   ALT (SGPT) U/L  --   --  23   AST (SGOT) U/L  --   --  45*   GLUCOSE mg/dL 99  < > 347*   < > = values in this interval not displayed.    Results from last 7 days  Lab Units 05/16/18  1943   INR  1.26*   APTT seconds 30.2           Results from last 7 days  Lab Units 05/17/18  0049   TROPONIN T ng/mL <0.010     Cultures:  Blood  Culture   Date Value Ref Range Status   05/17/2018 No growth at 3 days  Preliminary   05/17/2018 No growth at 3 days  Preliminary       I have reviewed daily medications and changes in CPOE    Scheduled meds    buPROPion  mg Oral Daily   clotrimazole 10 mg Oral 5x Daily   cycloSPORINE 1 drop Both Eyes BID   DULoxetine 90 mg Oral Daily   ferrous sulfate 325 mg Oral BID   folic acid 1 mg Oral Daily   immune globulin (human) 10 g Intravenous Daily   immune globulin (human) 20 g Intravenous Daily   insulin aspart 0-14 Units Subcutaneous 4x Daily With Meals & Nightly   insulin aspart 38 Units Subcutaneous TID With Meals   insulin detemir 15 Units Subcutaneous Once   insulin detemir 28 Units Subcutaneous Nightly   insulin detemir 80 Units Subcutaneous QAM   levETIRAcetam 500 mg Oral BID   Methylnaltrexone Bromide 450 mg Oral Daily   pantoprazole 40 mg Oral Q AM   polyethylene glycol 17 g Oral Daily   predniSONE 75 mg Oral Daily With Breakfast   pregabalin 75 mg Oral BID   rifaximin 550 mg Oral BID   spironolactone 100 mg Oral Daily   sucralfate 1 g Oral BID   trimethobenzamide 300 mg Oral TID          PRN meds  •  acetaminophen  •  ALPRAZolam  •  cyclobenzaprine  •  dextrose  •  dextrose  •  glucagon (human recombinant)  •  hydrOXYzine  •  oxyCODONE  •  promethazine  •  sodium chloride  •  Insert peripheral IV **AND** sodium chloride      Principal Problem:    Acute ITP  Active Problems:    Cirrhosis of liver    Rheumatoid arthritis    Anxiety and depression    Type 2 diabetes mellitus, uncontrolled, with neuropathy    DUKES (nonalcoholic steatohepatitis)    Hematemesis    Systemic lupus    Secondary esophageal varices without bleeding    Fever        Assessment/Plan:      Acute ITP-plts better, IVIG, prednisone 75      DUKES, Cirrhosis of liver      Rheumatoid arthritis  -followed by Dr Munoz  -trying to get back on embrel but having fevers      Anxiety and depression      Type 2 diabetes mellitus, uncontrolled,  with neuropathy  -endocrinology is following  -BS is now up to 438      Hematemesis- none since admission      Systemic lupus      Secondary esophageal varices without bleeding      Fever-temp curve is normal during the hospitalization  -wbc is low    Plan:  Maybe d/c tomorrow if BS better and ok with CBC group    Jose Almanza MD  05/22/18  3:04 PM

## 2018-05-22 NOTE — PROGRESS NOTES
REASON FOR FOLLOWUP/CHIEF COMPLAINT:  ITP  1. 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   2. Evidence of B12 deficiency on her initial lab evaluation in April 2013.  Patient was started on parenteral B12 replacement.  The patient was switched from shots to daily oral B12 replacement after the visit of 12/02/2013.    3. Hemoglobin has significantly improved since banding of her esophageal varices and increase in her oral iron supplement to twice daily dosing.  4. Morphologically normal bone marrow from 08/20/2013 with normal flow cytometry.  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  Significance of this is uncertain.    5. Mixed connective tissue disorder, currently on Enbrel therapy.  6. Insulin-requiring diabetes mellitus.  7. Patient continues to follow-up periodically with the transplant team at [facility] but currently she is not on the transplant list.     HISTORY OF PRESENT ILLNESS:   Continues to deny bleeding.  No epistaxis since admission.  Some mild worsening of the bruising on her abdomen attributable to the subcutaneous injections with severe thrombocytopenia.  No other areas of bruising.  C/o headaches which can happen with IvigG  Arthritis much improved with high-dose steroids  Received day 5 of IV IgG   platelets up to 40,000  so far- continue pred 60mg daily will have to make sure BS is controlled before d/c baseline platelet count @ 100k      Past Medical History, Past Surgical History, Social History, Family History have been reviewed and are without significant changes except as mentioned.    Review of Systems   Review of Systems   Constitutional: Negative for activity change.   HENT: Negative for nosebleeds and trouble swallowing.    Respiratory: Negative for shortness of breath and wheezing.    Cardiovascular: Negative for chest pain  "and palpitations.   Gastrointestinal: Negative for constipation, diarrhea and nausea.   Genitourinary: Negative for dysuria and hematuria.   Musculoskeletal: Negative for arthralgias and myalgias.   Skin: Negative for rash and wound.   Neurological: Negative for seizures and syncope.   Hematological: Negative for adenopathy. Bruises/bleeds easily.   Psychiatric/Behavioral: Negative for confusion.       Medications:  The current medication list was reviewed in the EMR    ALLERGIES:    Allergies   Allergen Reactions   • Albuterol Anaphylaxis   • Lactulose Nausea And Vomiting and Other (See Comments)     Severe abdominal pain   • Tramadol Other (See Comments)     Per pt causes her \"palsy, meaning shaking and tremors\" and gi upset, nausea   • Quinine Derivatives Nausea And Vomiting              Vitals:    05/22/18 0952 05/22/18 1223 05/22/18 1305 05/22/18 1319   BP: 131/78 140/82  136/82   BP Location:    Right arm   Patient Position:    Lying   Pulse: 105 98     Resp:    20   Temp: 98.2 °F (36.8 °C) 98.3 °F (36.8 °C)  98.1 °F (36.7 °C)   TempSrc: Oral  Oral Oral   SpO2:  97%  97%   Weight:       Height:         Physical Exam    CONSTITUTIONAL:  Vital signs reviewed.  No distress, looks comfortable.  EYES:  Conjunctiva and lids unremarkable.  PERRLA  EARS,NOSE,MOUTH,THROAT:  Ears and nose appear unremarkable.  Lips, teeth, gums appear unremarkable.  RESPIRATORY:  Normal respiratory effort.  Lungs clear to auscultation bilaterally.  CARDIOVASCULAR:  Normal S1, S2.  No murmurs rubs or gallops.  No significant lower extremity edema.  GASTROINTESTINAL: Abdomen appears unremarkable.  Nontender.  No hepatomegaly.  No splenomegaly.  NEURO: cranial nerves 2-12 grossly intact.  No focal deficits.  Appears to have equal strength all 4 extremities.  MUSCULOSKELETAL:  Unremarkable digits/nails.  No cyanosis or clubbing.  SKIN:  Warm.  No rashes.some bruising at the sites of subcutaneous injections in the abdomen which is " progressing some.  PSYCHIATRIC:  Normal judgment and insight.  Normal mood and affect.       RECENT LABS:  WBC   Date Value Ref Range Status   05/22/2018 3.21 (L) 4.50 - 10.70 10*3/mm3 Final   05/21/2018 2.91 (L) 4.50 - 10.70 10*3/mm3 Final   05/20/2018 4.29 (L) 4.50 - 10.70 10*3/mm3 Final     Hemoglobin   Date Value Ref Range Status   05/22/2018 10.3 (L) 11.9 - 15.5 g/dL Final   05/21/2018 9.5 (L) 11.9 - 15.5 g/dL Final   05/20/2018 10.6 (L) 11.9 - 15.5 g/dL Final     Platelets   Date Value Ref Range Status   05/22/2018 40 (C) 140 - 500 10*3/mm3 Final   05/21/2018 20 (C) 140 - 500 10*3/mm3 Final   05/20/2018 15 (C) 140 - 500 10*3/mm3 Final     Lab Results   Component Value Date    NEUTROABS 2.28 05/22/2018                   ASSESSMENT/PLAN:     *ITP.  Appears to be most consistent with ITP.  Peripheral smear shows no significant amount of schistocytes and no platelet clumping.  IPF elevated at 18.4%, consistent with a destructive etiology of thrombocytopenia.  Unremarkable: Folate, B12, fibrinogen, PTT.  Did not respond to platelet transfusion, also consistent with a destructive process.    Her only new medicine is Tigan, started mid April 2018 for nausea from gastroparesis.  Upon my review on Micromedex, blood dyscrasias have been reported, but no mention of immune mediated thrombocytopenia.  I doubt this is the cause of the platelet drop.  PLT unchanged this morning, but this is not surprising as this is only day 3 of prednisone and IVIG.   PLT Improved to 40 today from 20K yesterday.    *Mild elevation in INR at 1.26.  Suspect this is due to cirrhosis.     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  WBC improved from 2 up to 3, then again up to 4.3 today.  Hb better today, up to 10.6 from 9.9     *B12 deficiency.  On oral B12 since December 2013.     *History of esophageal varices banding.     *Iron deficiency anemia due to GI bleeding.  Has been on oral iron twice per day.  Ferritin 51 with  10% saturation.  This suggests iron deficiency.  300 mg Venofer daily day 2 of 2.     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  This is of unknown significance.     *Rheumatoid arthritis.  Reports she hasn't been unable to take Enbrel since December due to persistent fevers.     *Lupus     *Diabetes.  Hyperglycemia from steroids may be a problem.  Defer to hospitalist.     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     *Fever of up to 101 for a couple months.  She was trying to have a 48 hour time period with no fever associated started back on Enbrel.  Dr. Huertas infectious diseases is now following peripherally.  He does not feel there is an infectious etiology of the fever.  CT chest 5/17/18 negative for an etiology fever.  It did have changes suggestive of pancreatitis.    Last fever 100.4 the evening of 5/16/18.  Afebrile since.  (Has been on high-dose prednisone).     Plan  · Continue prednisone 60 mg daily at d/c   · Completed IVIG 400 mg/kg daily day 5 of 5  today  · Venofer 300 mg daily givenx2   · Defer expected hyperglycemia from steroids to hospitalist/endocrinology services.  · She usually sees Dr. Mcdowell in our office.

## 2018-05-22 NOTE — PROGRESS NOTES
57 y.o.   LOS: 6 days   Patient Care Team:  Blanca Pitts MD as PCP - General (Internal Medicine)  Sukhdeep Mcdowell MD as Consulting Physician (Hematology and Oncology)  Blanca Pitts MD as Referring Physician (Internal Medicine)    Chief Complaint: Hyperglycemia    Chief Complaint   Patient presents with   • Dizziness   • Nose Bleed       Subjective    Patient refused dose of NovoLog this morning with breakfast as a result at lunchtime blood sugar was 4 38 mg/dL.  She continues to have poor appetite and is not eating that much.  She complains that her poor appetite is secondary to her nausea.  Remains on prednisone 75 mg oral daily.      Interval History:    Review of Systems:   Review of Systems   Constitutional: Positive for appetite change and fatigue. Negative for fever.   Respiratory: Negative for shortness of breath.    Cardiovascular: Negative for chest pain.   Gastrointestinal: Positive for nausea. Negative for diarrhea and vomiting.   Endocrine: Negative for polydipsia and polyuria.   Genitourinary: Negative for flank pain.   Musculoskeletal: Negative for arthralgias and myalgias.   Skin: Positive for pallor.   Neurological: Negative for weakness and numbness.   Psychiatric/Behavioral: Negative for agitation.     Objective     Vital Signs   Temp:  [98 °F (36.7 °C)-98.4 °F (36.9 °C)] 98.1 °F (36.7 °C)  Heart Rate:  [] 98  Resp:  [16-20] 20  BP: (129-149)/(78-88) 136/82    Physical Exam:  Physical Exam   Constitutional: She is oriented to person, place, and time. She appears well-nourished.   Obese     HENT:   Head: Normocephalic and atraumatic.   Wide neck   Eyes: Conjunctivae and EOM are normal. No scleral icterus.   Neck: Normal range of motion. Neck supple. No thyromegaly present.   Acanthosis nigricans   Cardiovascular: Normal rate and normal heart sounds.    Pulmonary/Chest: Effort normal and breath sounds normal. No stridor. She has no wheezes.   Abdominal: Soft. Bowel sounds are normal. She  exhibits no distension. There is tenderness.   Central obesity   Musculoskeletal: She exhibits no edema or tenderness.   Neurological: She is alert and oriented to person, place, and time.   Skin: Skin is warm and dry. She is not diaphoretic.   Psychiatric: She has a normal mood and affect.   Vitals reviewed.  Results Review:     I reviewed the patient's new clinical results.      Glucose   Date/Time Value Ref Range Status   05/22/2018 0452 99 65 - 99 mg/dL Final   05/21/2018 0332 360 (H) 65 - 99 mg/dL Final   05/20/2018 0457 53 (L) 65 - 99 mg/dL Final     Lab Results (last 24 hours)     Procedure Component Value Units Date/Time    POC Glucose Once [[id number]]  (Abnormal) Collected:  05/22/18 1109    Specimen:  Blood Updated:  05/22/18 1123     Glucose 438 (H) mg/dL     Narrative:       RN Notified R and V Meter: [id number] : [id number] Gary Killian     CBC & Differential [[id number]] Collected:  05/22/18 0452    Specimen:  Blood Updated:  05/22/18 0635    Narrative:       The following orders were created for panel order CBC & Differential.  Procedure                               Abnormality         Status                     ---------                               -----------         ------                     Manual Differential[[id number]]          Abnormal            Final result               Scan Slide[[id number]]                                       Final result               CBC Auto Differential[[id number]]        Abnormal            Final result                 Please view results for these tests on the individual orders.    CBC Auto Differential [[id number]]  (Abnormal) Collected:  05/22/18 0452    Specimen:  Blood Updated:  05/22/18 0635     WBC 3.21 (L) 10*3/mm3      RBC 3.73 (L) 10*6/mm3      Hemoglobin 10.3 (L) g/dL      Hematocrit 32.9 (L) %      MCV 88.2 fL      MCH 27.6 pg      MCHC 31.3 (L) g/dL      RDW 16.5 (H) %      RDW-SD 52.4 fl      MPV -- fL      Comment: Unable to determine         Platelets 40 (C) 10*3/mm3     Scan Slide [[id number]] Collected:  05/22/18 0452    Specimen:  Blood Updated:  05/22/18 0635     Scan Slide --     Comment: See Manual Differential Results       Manual Differential [[id number]]  (Abnormal) Collected:  05/22/18 0452    Specimen:  Blood Updated:  05/22/18 0635     Neutrophil % 71.0 %      Lymphocyte % 24.0 %      Monocyte % 3.0 (L) %      Myelocyte % 2.0 (H) %      Neutrophils Absolute 2.28 10*3/mm3      Lymphocytes Absolute 0.77 (L) 10*3/mm3      Monocytes Absolute 0.10 (L) 10*3/mm3      Anisocytosis Slight/1+     WBC Morphology Normal     Platelet Morphology Normal    POC Glucose Once [[id number]]  (Normal) Collected:  05/22/18 0603    Specimen:  Blood Updated:  05/22/18 0617     Glucose 121 mg/dL     Narrative:       Meter: [id number] : Gummii Lucas JOE    Basic Metabolic Panel [[id number]] Collected:  05/22/18 0452    Specimen:  Blood Updated:  05/22/18 0552     Glucose 99 mg/dL      BUN 17 mg/dL      Creatinine 0.80 mg/dL      Sodium 138 mmol/L      Potassium 4.1 mmol/L      Chloride 102 mmol/L      CO2 23.8 mmol/L      Calcium 9.3 mg/dL      eGFR Non African Amer 74 mL/min/1.73      BUN/Creatinine Ratio 21.3     Anion Gap 12.2 mmol/L     Narrative:       GFR Normal >60  Chronic Kidney Disease <60  Kidney Failure <15    Blood Culture - Blood, [[id number]]  (Normal) Collected:  05/17/18 0054    Specimen:  Blood from Arm, Left Updated:  05/22/18 0101     Blood Culture No growth at 5 days    Blood Culture - Blood, [[id number]]  (Normal) Collected:  05/17/18 0049    Specimen:  Blood from Arm, Right Updated:  05/22/18 0101     Blood Culture No growth at 5 days    POC Glucose Once [[id number]]  (Abnormal) Collected:  05/21/18 2031    Specimen:  Blood Updated:  05/21/18 2102     Glucose 338 (H) mg/dL     Narrative:       Meter: [id number] : [id number] Lucas JOE    Hepatitis B & C Profile [[id number]] Collected:  05/18/18 1057    Specimen:  Blood  Updated:  05/21/18 1710     Hepatitis B Surface Ag Negative     Hep B E Ag Negative     Hep B Core IgM Negative     Hep B Core Total Ab Negative     Hep B E Ab Negative     Hep B S Ab Reactive     Comment:               Non Reactive: Inconsistent with immunity,                              less than 10 mIU/mL                Reactive:     Consistent with immunity,                              greater than 9.9 mIU/mL        Hepatitis C Ab 0.1 s/co ratio     Narrative:       Performed at:  [address] [facility]  [id number]  : Quan Aguilar PhD, Phone:  [phone]    Comment: [[id number]] Collected:  05/18/18 1057    Specimen:  Blood Updated:  05/21/18 1710     Comment Comment     Comment: Non reactive HCV antibody screen is consistent with no HCV infection,  unless recent infection is suspected or other evidence exists to  indicate HCV infection.       Narrative:       Performed at:  [address]  [facility]  [id number]  : Quan Aguilar PhD, Phone:  [phone]    POC Glucose Once [[id number]]  (Abnormal) Collected:  05/21/18 1638    Specimen:  Blood Updated:  05/21/18 1640     Glucose 222 (H) mg/dL     Narrative:       Meter: [id number] : [id number] [facility]        Imaging Results (last 24 hours)     ** No results found for the last 24 hours. **          Medication Review: done      Current Facility-Administered Medications:   •  acetaminophen (TYLENOL) tablet 650 mg, 650 mg, Oral, Q4H PRN, Jose Almanza MD, 650 mg at 05/22/18 0943  •  ALPRAZolam (XANAX) tablet 0.5 mg, 0.5 mg, Oral, BID PRN, Jose Almanza MD, 0.5 mg at 05/21/18 2307  •  buPROPion XL (WELLBUTRIN XL) 24 hr tablet 150 mg, 150 mg, Oral, Daily, Jose Almanza MD, 150 mg at 05/22/18 0851  •  clotrimazole (MYCELEX) omer 10 mg, 10 mg, Oral, 5x Daily, Jose Almanza MD, 10 mg at 05/22/18 1445  •  cyclobenzaprine (FLEXERIL) tablet 5 mg, 5 mg, Oral, TID  PRN, Jose Almanza MD, 5 mg at 05/17/18 0150  •  cycloSPORINE (RESTASIS) 0.05 % ophthalmic emulsion 1 drop, 1 drop, Both Eyes, BID, Jose Almanza MD, 1 drop at 05/22/18 0852  •  dextrose (D50W) solution 25 g, 25 g, Intravenous, Q15 Min PRN, Jose Almanza MD, 25 g at 05/20/18 0513  •  dextrose (GLUTOSE) oral gel 15 g, 15 g, Oral, Q15 Min PRN, Jose Almanza MD  •  DULoxetine (CYMBALTA) DR capsule 90 mg, 90 mg, Oral, Daily, Jose Almanza MD, 90 mg at 05/22/18 0851  •  ferrous sulfate tablet 325 mg, 325 mg, Oral, BID, Jose Almanza MD, 325 mg at 05/22/18 0851  •  folic acid (FOLVITE) tablet 1 mg, 1 mg, Oral, Daily, Jose Almanza MD, 1 mg at 05/22/18 0851  •  glucagon (human recombinant) (GLUCAGEN DIAGNOSTIC) injection 1 mg, 1 mg, Subcutaneous, PRN, Jose Almanza MD  •  hydrOXYzine (ATARAX) tablet 25 mg, 25 mg, Oral, TID PRN, Jose Almanza MD  •  immune globulin (human) (GAMMAGARD) infusion 10 g, 10 g, Intravenous, Daily, Gerson Olson II, MD, 10 g at 05/22/18 0952  •  immune globulin (human) (GAMMAGARD) infusion 20 g, 20 g, Intravenous, Daily, Gerson Olson II, MD, 20 g at 05/22/18 1223  •  insulin aspart (novoLOG) injection 0-14 Units, 0-14 Units, Subcutaneous, 4x Daily With Meals & Nightly, Jose Almanza MD, 14 Units at 05/22/18 1134  •  insulin aspart (novoLOG) injection 38 Units, 38 Units, Subcutaneous, TID With Meals, Merary Burnett MD, 38 Units at 05/22/18 1134  •  insulin detemir (LEVEMIR) injection 15 Units, 15 Units, Subcutaneous, Once, Merary Burnett MD  •  insulin detemir (LEVEMIR) injection 28 Units, 28 Units, Subcutaneous, Nightly, Merary Burnett MD, 28 Units at 05/21/18 2117  •  insulin detemir (LEVEMIR) injection 80 Units, 80 Units, Subcutaneous, QAM, Merary Burnett MD, 80 Units at 05/22/18 0845  •  levETIRAcetam (KEPPRA) tablet 500 mg, 500 mg, Oral, BID, Jose Almanza MD, 500 mg at 05/22/18 0850  •  Methylnaltrexone Bromide tablet 450 mg, 450 mg, Oral,  Daily, Jose Almanza MD, 450 mg at 05/22/18 0901  •  oxyCODONE (ROXICODONE) immediate release tablet 5 mg, 5 mg, Oral, Q6H PRN, Jose Almanza MD, 5 mg at 05/22/18 0657  •  pantoprazole (PROTONIX) EC tablet 40 mg, 40 mg, Oral, Q AM, Wesly Walsh MD, 40 mg at 05/22/18 0657  •  polyethylene glycol 3350 powder (packet), 17 g, Oral, Daily, Wesly Walsh MD, 17 g at 05/21/18 1145  •  predniSONE (DELTASONE) tablet 75 mg, 75 mg, Oral, Daily With Breakfast, Gerson Olson II, MD, 75 mg at 05/22/18 0849  •  pregabalin (LYRICA) capsule 75 mg, 75 mg, Oral, BID, Jose Almanza MD, 75 mg at 05/22/18 0850  •  promethazine (PHENERGAN) tablet 25 mg, 25 mg, Oral, Q6H PRN, Jose Almanza MD, 25 mg at 05/21/18 1507  •  rifaximin (XIFAXAN) tablet 550 mg, 550 mg, Oral, BID, Jose Almanza MD, 550 mg at 05/22/18 0851  •  sodium chloride 0.9 % flush 1-10 mL, 1-10 mL, Intravenous, PRN, Jose Almanza MD, 10 mL at 05/19/18 2134  •  Insert peripheral IV, , , Once **AND** sodium chloride 0.9 % flush 10 mL, 10 mL, Intravenous, PRN, Mauro Moore MD  •  spironolactone (ALDACTONE) tablet 100 mg, 100 mg, Oral, Daily, Jose Almanza MD, 100 mg at 05/22/18 0851  •  sucralfate (CARAFATE) tablet 1 g, 1 g, Oral, BID, Jose Almanza MD, 1 g at 05/22/18 0850  •  trimethobenzamide (TIGAN) capsule 300 mg, 300 mg, Oral, TID, Jose Almanza MD, 300 mg at 05/22/18 0850    Assessment/Plan     Active Hospital Problems (** Indicates Principal Problem)    Diagnosis Date Noted   • **Acute ITP [D69.3] 05/18/2018   • Fever [R50.9] 05/17/2018   • Secondary esophageal varices without bleeding [I85.10] 02/22/2017   • Systemic lupus [M32.9] 02/22/2017   • Hematemesis [K92.0] 02/15/2017   • DUKES (nonalcoholic steatohepatitis) [K75.81] 06/14/2016   • Type 2 diabetes mellitus, uncontrolled, with neuropathy [E11.40, E11.65] 03/15/2016   • Cirrhosis of liver [K74.60] 03/08/2016   • Rheumatoid arthritis [M06.9] 03/08/2016  "  • Anxiety and depression [F41.8] 03/08/2016      Resolved Hospital Problems    Diagnosis Date Noted Date Resolved   • Thrombocytopenia [D69.6] 05/16/2018 05/18/2018   • Secondary esophageal varices with bleeding [I85.11] 03/07/2017 05/17/2018       Type 2 diabetes mellitus-significantly uncontrolled  Blood sugars are severely elevated  Continue levemir 80 units in the morning  Will continue levemir 28 units at bedtime  Will give levemir 15 units ×1  Decreased NovoLog to 34 units with each meal along with holding parameters  Continue NovoLog high dose sliding scale.    Hyperlipidemia  Currently holding statin.    Hyperglycemia secondary stress steroids  Currently on 75 mg oral daily of prednisone  Blood sugars are severely uncontrolled due to the prednisone and patient's nausea/poor appetite  We will continue to adjust insulin to address her elevated blood sugars.    Given patient's comorbidities goal blood sugars will be around 200 mg/dL range for now.      Merary Burnett MD.  05/22/18  3:24 PM      EMR Dragon / transcription disclaimer:    \"Dictated utilizing Dragon dictation\".   "

## 2018-05-22 NOTE — PLAN OF CARE
Problem: Patient Care Overview  Goal: Plan of Care Review  Outcome: Ongoing (interventions implemented as appropriate)   05/22/18 0358   Coping/Psychosocial   Plan of Care Reviewed With patient   Plan of Care Review   Progress improving   OTHER   Outcome Summary Pt complained of headache that has lasted most of the day and night. Labs improving. BG is not controlled,, pt is still taking prednisone. Cont to monitor, safety maintained.      Goal: Discharge Needs Assessment  Outcome: Ongoing (interventions implemented as appropriate)      Problem: Fall Risk (Adult)  Goal: Absence of Fall  Outcome: Ongoing (interventions implemented as appropriate)

## 2018-05-23 VITALS
RESPIRATION RATE: 20 BRPM | HEART RATE: 107 BPM | DIASTOLIC BLOOD PRESSURE: 80 MMHG | WEIGHT: 167.1 LBS | TEMPERATURE: 98.4 F | SYSTOLIC BLOOD PRESSURE: 137 MMHG | BODY MASS INDEX: 26.86 KG/M2 | HEIGHT: 66 IN | OXYGEN SATURATION: 99 %

## 2018-05-23 LAB
ANION GAP SERPL CALCULATED.3IONS-SCNC: 11.2 MMOL/L
BASOPHILS # BLD AUTO: 0 10*3/MM3 (ref 0–0.2)
BASOPHILS NFR BLD AUTO: 0 % (ref 0–1.5)
BUN BLD-MCNC: 16 MG/DL (ref 6–20)
BUN/CREAT SERPL: 19.8 (ref 7–25)
CALCIUM SPEC-SCNC: 9.2 MG/DL (ref 8.6–10.5)
CHLORIDE SERPL-SCNC: 101 MMOL/L (ref 98–107)
CO2 SERPL-SCNC: 24.8 MMOL/L (ref 22–29)
CREAT BLD-MCNC: 0.81 MG/DL (ref 0.57–1)
DEPRECATED RDW RBC AUTO: 51.9 FL (ref 37–54)
EOSINOPHIL # BLD AUTO: 0.02 10*3/MM3 (ref 0–0.7)
EOSINOPHIL NFR BLD AUTO: 0.7 % (ref 0.3–6.2)
ERYTHROCYTE [DISTWIDTH] IN BLOOD BY AUTOMATED COUNT: 16.6 % (ref 11.7–13)
GFR SERPL CREATININE-BSD FRML MDRD: 73 ML/MIN/1.73
GLUCOSE BLD-MCNC: 216 MG/DL (ref 65–99)
GLUCOSE BLDC GLUCOMTR-MCNC: 202 MG/DL (ref 70–130)
GLUCOSE BLDC GLUCOMTR-MCNC: 215 MG/DL (ref 70–130)
GLUCOSE BLDC GLUCOMTR-MCNC: 229 MG/DL (ref 70–130)
HCT VFR BLD AUTO: 30.8 % (ref 35.6–45.5)
HGB BLD-MCNC: 9.9 G/DL (ref 11.9–15.5)
IMM GRANULOCYTES # BLD: 0.1 10*3/MM3 (ref 0–0.03)
IMM GRANULOCYTES NFR BLD: 3.5 % (ref 0–0.5)
LYMPHOCYTES # BLD AUTO: 0.84 10*3/MM3 (ref 0.9–4.8)
LYMPHOCYTES NFR BLD AUTO: 29.6 % (ref 19.6–45.3)
MCH RBC QN AUTO: 28.3 PG (ref 26.9–32)
MCHC RBC AUTO-ENTMCNC: 32.1 G/DL (ref 32.4–36.3)
MCV RBC AUTO: 88 FL (ref 80.5–98.2)
MONOCYTES # BLD AUTO: 0.25 10*3/MM3 (ref 0.2–1.2)
MONOCYTES NFR BLD AUTO: 8.8 % (ref 5–12)
NEUTROPHILS # BLD AUTO: 1.73 10*3/MM3 (ref 1.9–8.1)
NEUTROPHILS NFR BLD AUTO: 60.9 % (ref 42.7–76)
PLATELET # BLD AUTO: 53 10*3/MM3 (ref 140–500)
PMV BLD AUTO: 12.7 FL (ref 6–12)
POTASSIUM BLD-SCNC: 4.3 MMOL/L (ref 3.5–5.2)
RBC # BLD AUTO: 3.5 10*6/MM3 (ref 3.9–5.2)
SODIUM BLD-SCNC: 137 MMOL/L (ref 136–145)
WBC NRBC COR # BLD: 2.84 10*3/MM3 (ref 4.5–10.7)

## 2018-05-23 PROCEDURE — 85025 COMPLETE CBC W/AUTO DIFF WBC: CPT | Performed by: INTERNAL MEDICINE

## 2018-05-23 PROCEDURE — 80048 BASIC METABOLIC PNL TOTAL CA: CPT | Performed by: HOSPITALIST

## 2018-05-23 PROCEDURE — 82962 GLUCOSE BLOOD TEST: CPT

## 2018-05-23 PROCEDURE — 63710000001 PREDNISONE PER 5 MG: Performed by: INTERNAL MEDICINE

## 2018-05-23 PROCEDURE — 63710000001 INSULIN ASPART PER 5 UNITS: Performed by: HOSPITALIST

## 2018-05-23 PROCEDURE — 63710000001 PREDNISONE PER 1 MG: Performed by: INTERNAL MEDICINE

## 2018-05-23 PROCEDURE — 99232 SBSQ HOSP IP/OBS MODERATE 35: CPT | Performed by: INTERNAL MEDICINE

## 2018-05-23 PROCEDURE — 63710000001 INSULIN ASPART PER 5 UNITS: Performed by: INTERNAL MEDICINE

## 2018-05-23 RX ORDER — PREDNISONE 1 MG/1
60 TABLET ORAL
Qty: 360 TABLET | Refills: 0 | Status: SHIPPED | OUTPATIENT
Start: 2018-05-23 | End: 2018-06-22

## 2018-05-23 RX ORDER — CYCLOBENZAPRINE HCL 5 MG
5 TABLET ORAL 3 TIMES DAILY PRN
Start: 2018-05-23 | End: 2018-10-17 | Stop reason: SDUPTHER

## 2018-05-23 RX ORDER — ACETAMINOPHEN 325 MG/1
650 TABLET ORAL EVERY 4 HOURS PRN
Start: 2018-05-23 | End: 2018-10-11 | Stop reason: HOSPADM

## 2018-05-23 RX ADMIN — LEVETIRACETAM 500 MG: 500 TABLET, FILM COATED ORAL at 09:17

## 2018-05-23 RX ADMIN — TRIMETHOBENZAMIDE HYDROCHLORIDE 300 MG: 300 CAPSULE ORAL at 16:28

## 2018-05-23 RX ADMIN — SUCRALFATE 1 G: 1 TABLET ORAL at 09:17

## 2018-05-23 RX ADMIN — CLOTRIMAZOLE 10 MG: 10 LOZENGE ORAL at 12:02

## 2018-05-23 RX ADMIN — FERROUS SULFATE TAB 325 MG (65 MG ELEMENTAL FE) 325 MG: 325 (65 FE) TAB at 09:17

## 2018-05-23 RX ADMIN — SPIRONOLACTONE 100 MG: 100 TABLET ORAL at 09:17

## 2018-05-23 RX ADMIN — FOLIC ACID 1 MG: 1 TABLET ORAL at 09:17

## 2018-05-23 RX ADMIN — RIFAXIMIN 550 MG: 550 TABLET ORAL at 09:17

## 2018-05-23 RX ADMIN — CLOTRIMAZOLE 10 MG: 10 LOZENGE ORAL at 16:27

## 2018-05-23 RX ADMIN — PREDNISONE 75 MG: 5 TABLET ORAL at 09:17

## 2018-05-23 RX ADMIN — PREGABALIN 75 MG: 75 CAPSULE ORAL at 09:17

## 2018-05-23 RX ADMIN — PANTOPRAZOLE SODIUM 40 MG: 40 TABLET, DELAYED RELEASE ORAL at 07:00

## 2018-05-23 RX ADMIN — INSULIN ASPART 5 UNITS: 100 INJECTION, SOLUTION INTRAVENOUS; SUBCUTANEOUS at 12:02

## 2018-05-23 RX ADMIN — BUPROPION HYDROCHLORIDE 150 MG: 150 TABLET, EXTENDED RELEASE ORAL at 09:17

## 2018-05-23 RX ADMIN — TRIMETHOBENZAMIDE HYDROCHLORIDE 300 MG: 300 CAPSULE ORAL at 09:17

## 2018-05-23 RX ADMIN — CYCLOSPORINE 1 DROP: 0.5 EMULSION OPHTHALMIC at 09:20

## 2018-05-23 RX ADMIN — DULOXETINE HYDROCHLORIDE 90 MG: 60 CAPSULE, DELAYED RELEASE ORAL at 09:17

## 2018-05-23 RX ADMIN — CLOTRIMAZOLE 10 MG: 10 LOZENGE ORAL at 07:00

## 2018-05-23 RX ADMIN — INSULIN ASPART 38 UNITS: 100 INJECTION, SOLUTION INTRAVENOUS; SUBCUTANEOUS at 12:02

## 2018-05-23 RX ADMIN — INSULIN ASPART 5 UNITS: 100 INJECTION, SOLUTION INTRAVENOUS; SUBCUTANEOUS at 09:18

## 2018-05-23 RX ADMIN — INSULIN ASPART 38 UNITS: 100 INJECTION, SOLUTION INTRAVENOUS; SUBCUTANEOUS at 09:17

## 2018-05-23 RX ADMIN — ALPRAZOLAM 0.5 MG: 0.5 TABLET ORAL at 09:17

## 2018-05-23 NOTE — PLAN OF CARE
Problem: Patient Care Overview  Goal: Plan of Care Review  Outcome: Ongoing (interventions implemented as appropriate)   05/23/18 9262   Coping/Psychosocial   Plan of Care Reviewed With patient   Plan of Care Review   Progress improving   OTHER   Outcome Summary Pt had last of IVIG yesterday on day shift. Pt complained of being anxious, medicated. Pt slept well over night with new complaints. BG was low and high previous shift. Insulin given as ordered. No fevers but pt has received tylenol. Cont to monitor, safety maintained. Home soon.     Goal: Discharge Needs Assessment  Outcome: Ongoing (interventions implemented as appropriate)      Problem: Fall Risk (Adult)  Goal: Absence of Fall  Outcome: Ongoing (interventions implemented as appropriate)      Problem: Liver Failure, Acute/Chronic (Adult)  Goal: Signs and Symptoms of Listed Potential Problems Will be Absent, Minimized or Managed (Liver Failure, Acute/Chronic)  Outcome: Ongoing (interventions implemented as appropriate)

## 2018-05-23 NOTE — PROGRESS NOTES
REASON FOR FOLLOWUP/CHIEF COMPLAINT:  ITP  1. 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   2. Evidence of B12 deficiency on her initial lab evaluation in April 2013.  Patient was started on parenteral B12 replacement.  The patient was switched from shots to daily oral B12 replacement after the visit of 12/02/2013.    3. Hemoglobin has significantly improved since banding of her esophageal varices and increase in her oral iron supplement to twice daily dosing.  4. Morphologically normal bone marrow from 08/20/2013 with normal flow cytometry.  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  Significance of this is uncertain.    5. Mixed connective tissue disorder, currently on Enbrel therapy.  6. Insulin-requiring diabetes mellitus.  7. Patient continues to follow-up periodically with the transplant team at [facility] but currently she is not on the transplant list.     HISTORY OF PRESENT ILLNESS:   Continues to deny bleeding.  No epistaxis since admission.  Some mild worsening of the bruising on her abdomen attributable to the subcutaneous injections with severe thrombocytopenia.  No other areas of bruising.  C/o headaches which can happen with IvigG  Arthritis much improved with high-dose steroids  Received day 5 of IV IgG   platelets up to 40,000  so far- continue pred 60mg daily will have to make sure BS is controlled before d/c baseline platelet count @ 100k    5/23  Hg 9.9 WBC 2800 platelet 53K  D/c planned in 1-2 days will see in office in 1 week with NP visit  pred  60mg at least for 1 week with slow taper over 8-12 weeks    Past Medical History, Past Surgical History, Social History, Family History have been reviewed and are without significant changes except as mentioned.    Review of Systems   Review of Systems   Constitutional: Negative for activity  "change.   HENT: Negative for nosebleeds and trouble swallowing.    Respiratory: Negative for shortness of breath and wheezing.    Cardiovascular: Negative for chest pain and palpitations.   Gastrointestinal: Negative for constipation, diarrhea and nausea.   Genitourinary: Negative for dysuria and hematuria.   Musculoskeletal: Negative for arthralgias and myalgias.   Skin: Negative for rash and wound.   Neurological: Negative for seizures and syncope.   Hematological: Negative for adenopathy. Bruises/bleeds easily.   Psychiatric/Behavioral: Negative for confusion.       Medications:  The current medication list was reviewed in the EMR    ALLERGIES:    Allergies   Allergen Reactions   • Albuterol Anaphylaxis   • Lactulose Nausea And Vomiting and Other (See Comments)     Severe abdominal pain   • Tramadol Other (See Comments)     Per pt causes her \"palsy, meaning shaking and tremors\" and gi upset, nausea   • Quinine Derivatives Nausea And Vomiting              Vitals:    05/22/18 1723 05/22/18 1922 05/22/18 2310 05/23/18 0739   BP: 152/88 128/80 135/80 126/80   BP Location:  Right arm Right arm Right arm   Patient Position:  Lying Lying Lying   Pulse: 100 106 93 96   Resp: 19 18 18 18   Temp:  98.1 °F (36.7 °C) 98.2 °F (36.8 °C) 97.5 °F (36.4 °C)   TempSrc:  Oral Oral Oral   SpO2: 98% 97% 96% 98%   Weight:       Height:         Physical Exam    CONSTITUTIONAL:  Vital signs reviewed.  No distress, looks comfortable.  EYES:  Conjunctiva and lids unremarkable.  PERRLA  EARS,NOSE,MOUTH,THROAT:  Ears and nose appear unremarkable.  Lips, teeth, gums appear unremarkable.  RESPIRATORY:  Normal respiratory effort.  Lungs clear to auscultation bilaterally.  CARDIOVASCULAR:  Normal S1, S2.  No murmurs rubs or gallops.  No significant lower extremity edema.  GASTROINTESTINAL: Abdomen appears unremarkable.  Nontender.  No hepatomegaly.  No splenomegaly.  MUSCULOSKELETAL:  Unremarkable digits/nails.  No cyanosis or clubbing.  SKIN:  " Warm.  No rashes.some bruising at the sites of subcutaneous injections in the abdomen which is progressing some.  PSYCHIATRIC:  Normal judgment and insight.  Normal mood and affect.       RECENT LABS:  WBC   Date Value Ref Range Status   05/23/2018 2.84 (L) 4.50 - 10.70 10*3/mm3 Final   05/22/2018 3.21 (L) 4.50 - 10.70 10*3/mm3 Final   05/21/2018 2.91 (L) 4.50 - 10.70 10*3/mm3 Final     Hemoglobin   Date Value Ref Range Status   05/23/2018 9.9 (L) 11.9 - 15.5 g/dL Final   05/22/2018 10.3 (L) 11.9 - 15.5 g/dL Final   05/21/2018 9.5 (L) 11.9 - 15.5 g/dL Final     Platelets   Date Value Ref Range Status   05/23/2018 53 (L) 140 - 500 10*3/mm3 Final   05/22/2018 40 (C) 140 - 500 10*3/mm3 Final   05/21/2018 20 (C) 140 - 500 10*3/mm3 Final     Lab Results   Component Value Date    NEUTROABS 1.73 (L) 05/23/2018                   ASSESSMENT/PLAN:     *ITP.  Appears to be most consistent with ITP.  Peripheral smear shows no significant amount of schistocytes and no platelet clumping.  IPF elevated at 18.4%, consistent with a destructive etiology of thrombocytopenia.  Unremarkable: Folate, B12, fibrinogen, PTT.  Did not respond to platelet transfusion, also consistent with a destructive process.    Her only new medicine is Tigan, started mid April 2018 for nausea from gastroparesis.  Upon my review on Micromedex, blood dyscrasias have been reported, but no mention of immune mediated thrombocytopenia.  I doubt this is the cause of the platelet drop.  PLT unchanged this morning, but this is not surprising as this is only day 3 of prednisone and IVIG.   PLT Improved to 40 today from 20K yesterday.    *Mild elevation in INR at 1.26.  Suspect this is due to cirrhosis.     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  WBC improved from 2 up to 3, then again up to 4.3 today.  Hb better today, up to 10.6 from 9.9     *B12 deficiency.  On oral B12 since December 2013.     *History of esophageal varices  banding.     *Iron deficiency anemia due to GI bleeding.  Has been on oral iron twice per day.  Ferritin 51 with 10% saturation.  This suggests iron deficiency.  300 mg Venofer daily day 2 of 2.     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  This is of unknown significance.     *Rheumatoid arthritis.  Reports she hasn't been unable to take Enbrel since December due to persistent fevers.     *Lupus     *Diabetes.  Hyperglycemia from steroids may be a problem.  Defer to hospitalist.     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     *Fever of up to 101 for a couple months.  She was trying to have a 48 hour time period with no fever associated started back on Enbrel.  Dr. Huertas infectious diseases is now following peripherally.  He does not feel there is an infectious etiology of the fever.  CT chest 5/17/18 negative for an etiology fever.  It did have changes suggestive of pancreatitis.    Last fever 100.4 the evening of 5/16/18.  Afebrile since.  (Has been on high-dose prednisone).     Plan  · Continue prednisone 60 mg daily at d/c   · Completed IVIG 400 mg/kg daily x5  · Venofer 300 mg daily givenx2   · Defer expected hyperglycemia from steroids to hospitalist/endocrinology services.  · She usually sees Dr. Mcdowell in our office.f/u in 1 NPweek with cbc and NP  Hold off on resuming enbrel for now as the steroids will likely help for now

## 2018-05-23 NOTE — PROGRESS NOTES
57 y.o.   LOS: 7 days   Patient Care Team:  Blanca Pitts MD as PCP - General (Internal Medicine)  Sukhdeep Mcdowell MD as Consulting Physician (Hematology and Oncology)  Blanca Pitts MD as Referring Physician (Internal Medicine)    Chief Complaint:  hyperglycemia    Chief Complaint   Patient presents with   • Dizziness   • Nose Bleed       Subjective   Blood glucose are still very uncontrolled and she is not eating that much due to nausea.     Interval History:    Review of Systems:   Review of Systems   Constitutional: Positive for appetite change and fatigue. Negative for fever.   Eyes: Negative for visual disturbance.   Respiratory: Negative for shortness of breath.    Cardiovascular: Negative for palpitations and leg swelling.   Gastrointestinal: Positive for abdominal pain and nausea. Negative for vomiting.   Endocrine: Negative for polydipsia and polyuria.   Musculoskeletal: Negative for joint swelling and neck pain.   Skin: Positive for pallor. Negative for rash and wound.   Neurological: Negative for weakness and numbness.   Psychiatric/Behavioral: Negative for behavioral problems.     Objective     Vital Signs   Temp:  [97.5 °F (36.4 °C)-98.3 °F (36.8 °C)] 97.5 °F (36.4 °C)  Heart Rate:  [] 96  Resp:  [18-20] 18  BP: (126-152)/(80-88) 126/80    Physical Exam:  Physical Exam   Constitutional: She is oriented to person, place, and time. She appears well-nourished.   Obese     HENT:   Head: Normocephalic and atraumatic.   Wide neck   Eyes: Conjunctivae and EOM are normal. No scleral icterus.   Neck: Normal range of motion. Neck supple. No thyromegaly present.   Acanthosis nigricans   Cardiovascular: Normal rate and normal heart sounds.    Pulmonary/Chest: Effort normal and breath sounds normal. No stridor. She has no wheezes.   Abdominal: Soft. Bowel sounds are normal. She exhibits no distension. There is no tenderness.   Central obesity   Musculoskeletal: She exhibits no edema or tenderness.    Neurological: She is alert and oriented to person, place, and time.   Skin: Skin is warm and dry. She is not diaphoretic.   Psychiatric: She has a normal mood and affect.   Vitals reviewed.  Results Review:     I reviewed the patient's new clinical results.      Glucose   Date/Time Value Ref Range Status   05/23/2018 0503 216 (H) 65 - 99 mg/dL Final   05/22/2018 0452 99 65 - 99 mg/dL Final   05/21/2018 0332 360 (H) 65 - 99 mg/dL Final     Lab Results (last 24 hours)     Procedure Component Value Units Date/Time    POC Glucose Once [[id number]]  (Abnormal) Collected:  05/23/18 0633    Specimen:  Blood Updated:  05/23/18 0634     Glucose 215 (H) mg/dL     Narrative:       Meter: [id number] : [id number] Rahel JOE    Basic Metabolic Panel [[id number]]  (Abnormal) Collected:  05/23/18 0503    Specimen:  Blood Updated:  05/23/18 0557     Glucose 216 (H) mg/dL      BUN 16 mg/dL      Creatinine 0.81 mg/dL      Sodium 137 mmol/L      Potassium 4.3 mmol/L      Chloride 101 mmol/L      CO2 24.8 mmol/L      Calcium 9.2 mg/dL      eGFR Non African Amer 73 mL/min/1.73      BUN/Creatinine Ratio 19.8     Anion Gap 11.2 mmol/L     Narrative:       GFR Normal >60  Chronic Kidney Disease <60  Kidney Failure <15    CBC & Differential [[id number]] Collected:  05/23/18 0503    Specimen:  Blood Updated:  05/23/18 0556    Narrative:       The following orders were created for panel order CBC & Differential.  Procedure                               Abnormality         Status                     ---------                               -----------         ------                     CBC Auto Differential[[id number]]        Abnormal            Final result                 Please view results for these tests on the individual orders.    CBC Auto Differential [[id number]]  (Abnormal) Collected:  05/23/18 0503    Specimen:  Blood Updated:  05/23/18 0556     WBC 2.84 (L) 10*3/mm3      RBC 3.50 (L) 10*6/mm3      Hemoglobin 9.9 (L) g/dL       Hematocrit 30.8 (L) %      MCV 88.0 fL      MCH 28.3 pg      MCHC 32.1 (L) g/dL      RDW 16.6 (H) %      RDW-SD 51.9 fl      MPV 12.7 (H) fL      Platelets 53 (L) 10*3/mm3      Neutrophil % 60.9 %      Lymphocyte % 29.6 %      Monocyte % 8.8 %      Eosinophil % 0.7 %      Basophil % 0.0 %      Immature Grans % 3.5 (H) %      Neutrophils, Absolute 1.73 (L) 10*3/mm3      Lymphocytes, Absolute 0.84 (L) 10*3/mm3      Monocytes, Absolute 0.25 10*3/mm3      Eosinophils, Absolute 0.02 10*3/mm3      Basophils, Absolute 0.00 10*3/mm3      Immature Grans, Absolute 0.10 (H) 10*3/mm3     POC Glucose Once [[id number]]  (Abnormal) Collected:  05/22/18 2047    Specimen:  Blood Updated:  05/22/18 2048     Glucose 398 (H) mg/dL     Narrative:       Meter: [id number] : [id number] Rahelnimo Partidaholly JOE    Fungitell B-D Glucan [[id number]]  (Abnormal) Collected:  05/18/18 1057    Specimen:  Blood Updated:  05/22/18 1908     Result 310 (H) pg/mL      Comment: Interpretation: The Fungitell assay does not detect  certain  fungal species such as the genus Cryptococcus (Ignacia et  al. 1991) which produces very low levels of  (1-3)-Beta-D-Glucan. The assay also does not detect the  Zygomycetes such as Absidia, Mucor and Rhizopus (Jessica  et  al. 1994) which are not known to produce  (1-3)-Beta-D-Glucan. In addition, the yeast phase of  Blastomyces dermatitidis produces little  (1-3)-Beta-D-Glucan and may not be detected by the assay  (Rhiannon et al. 2007).  Reference Range:  Less than 60 pg/mL. Glucan values of less than 60 pg/mL  are  interpreted as negative.  Glucan values of 60 to 79 pg/mL are interpreted as  indeterminate, and suggest a possible fungal infection.  Additional sampling and testing of sera is required to  interpret the results.  Glucan values of greater than or equal to 80 pg/mL are  interpreted as positive.  Due to the potential for environmental contamination when  transferred to pour-off tubes, which can lead to  false  positive results, interpret positive results from samples  provided in pour-off tubes with caution.  Results should  be  used in conjunction with clinical findings, and should not  form the sole basis for a diagnosis or treatment decision.  The Fungitell test is approved or cleared for in vitro  diagnostic use by the [place] Food and Drug Administration.  Modifications to the approved package insert have been  made  and the performance characteristics for these  modifications  were determined by [facility].  If sample result is greater than 500 pg/mL, physician may  order a titer of the sample. Please contact [facility] if you would like to order a retest of this  sample  to obtain an actual value. Samples are held for 1 week  after initial testing date.       Narrative:       Performed at:  01 - [facility]  [address] [place] [address]  [id number]  : Manasa Solomon PhD, Phone:  [phone]    POC Glucose Once [[id number]]  (Abnormal) Collected:  05/22/18 1627    Specimen:  Blood Updated:  05/22/18 1629     Glucose 345 (H) mg/dL     Narrative:       Meter: [id number] : [id number] Gary JOE    POC Glucose Once [[id number]]  (Abnormal) Collected:  05/22/18 1109    Specimen:  Blood Updated:  05/22/18 1123     Glucose 438 (H) mg/dL     Narrative:       RN Notified R and V Meter: [id number] : [id number] Gary Killian         Imaging Results (last 24 hours)     ** No results found for the last 24 hours. **          Medication Review: done      Current Facility-Administered Medications:   •  acetaminophen (TYLENOL) tablet 650 mg, 650 mg, Oral, Q4H PRN, Jose Almanza MD, 650 mg at 05/22/18 0943  •  ALPRAZolam (XANAX) tablet 0.5 mg, 0.5 mg, Oral, BID PRN, Jose Almanza MD, 0.5 mg at 05/23/18 0917  •  buPROPion XL (WELLBUTRIN XL) 24 hr tablet 150 mg, 150 mg, Oral, Daily, Jose Almanza MD, 150 mg at 05/23/18 0917  •  clotrimazole  (MYCELEX) omer 10 mg, 10 mg, Oral, 5x Daily, Jose Almanza MD, 10 mg at 05/23/18 0700  •  cyclobenzaprine (FLEXERIL) tablet 5 mg, 5 mg, Oral, TID PRN, Jose Almanza MD, 5 mg at 05/17/18 0150  •  cycloSPORINE (RESTASIS) 0.05 % ophthalmic emulsion 1 drop, 1 drop, Both Eyes, BID, Jose Almanza MD, 1 drop at 05/23/18 0920  •  dextrose (D50W) solution 25 g, 25 g, Intravenous, Q15 Min PRN, Jose Almanza MD, 25 g at 05/20/18 0513  •  dextrose (GLUTOSE) oral gel 15 g, 15 g, Oral, Q15 Min PRN, Jose Almanza MD  •  DULoxetine (CYMBALTA) DR capsule 90 mg, 90 mg, Oral, Daily, Jose Almanza MD, 90 mg at 05/23/18 0917  •  ferrous sulfate tablet 325 mg, 325 mg, Oral, BID, Jose Almanza MD, 325 mg at 05/23/18 0917  •  folic acid (FOLVITE) tablet 1 mg, 1 mg, Oral, Daily, Jose Almanza MD, 1 mg at 05/23/18 0917  •  glucagon (human recombinant) (GLUCAGEN DIAGNOSTIC) injection 1 mg, 1 mg, Subcutaneous, PRN, Jose Almanza MD  •  hydrOXYzine (ATARAX) tablet 25 mg, 25 mg, Oral, TID PRN, Jose Almanza MD  •  insulin aspart (novoLOG) injection 0-14 Units, 0-14 Units, Subcutaneous, 4x Daily With Meals & Nightly, Jose Almanza MD, 5 Units at 05/23/18 0918  •  insulin aspart (novoLOG) injection 38 Units, 38 Units, Subcutaneous, TID With Meals, Merary Burnett MD, 38 Units at 05/23/18 0917  •  insulin detemir (LEVEMIR) injection 28 Units, 28 Units, Subcutaneous, Nightly, Merary Burnett MD, 28 Units at 05/22/18 2112  •  insulin detemir (LEVEMIR) injection 80 Units, 80 Units, Subcutaneous, QAM, Merary Burnett MD, 80 Units at 05/23/18 0916  •  levETIRAcetam (KEPPRA) tablet 500 mg, 500 mg, Oral, BID, Jose Almanza MD, 500 mg at 05/23/18 0917  •  Methylnaltrexone Bromide tablet 450 mg, 450 mg, Oral, Daily, Jose Almanza MD, 450 mg at 05/23/18 0917  •  oxyCODONE (ROXICODONE) immediate release tablet 5 mg, 5 mg, Oral, Q6H PRN, Jose Almanza MD, 5 mg at 05/22/18 0109  •  pantoprazole  (PROTONIX) EC tablet 40 mg, 40 mg, Oral, Q AM, Wesly Walsh MD, 40 mg at 05/23/18 0700  •  polyethylene glycol 3350 powder (packet), 17 g, Oral, Daily, Wesly Walsh MD, 17 g at 05/21/18 1145  •  predniSONE (DELTASONE) tablet 75 mg, 75 mg, Oral, Daily With Breakfast, Gerson Olson II, MD, 75 mg at 05/23/18 0917  •  pregabalin (LYRICA) capsule 75 mg, 75 mg, Oral, BID, Jose Almanza MD, 75 mg at 05/23/18 0917  •  promethazine (PHENERGAN) tablet 25 mg, 25 mg, Oral, Q6H PRN, Jose Almanza MD, 25 mg at 05/22/18 2339  •  rifaximin (XIFAXAN) tablet 550 mg, 550 mg, Oral, BID, Jose Almanza MD, 550 mg at 05/23/18 0917  •  sodium chloride 0.9 % flush 1-10 mL, 1-10 mL, Intravenous, PRN, Jose Almanza MD, 10 mL at 05/19/18 2134  •  Insert peripheral IV, , , Once **AND** sodium chloride 0.9 % flush 10 mL, 10 mL, Intravenous, PRN, Mauro Moore MD  •  spironolactone (ALDACTONE) tablet 100 mg, 100 mg, Oral, Daily, Jose Almanza MD, 100 mg at 05/23/18 0917  •  sucralfate (CARAFATE) tablet 1 g, 1 g, Oral, BID, Jose Almanza MD, 1 g at 05/23/18 0917  •  trimethobenzamide (TIGAN) capsule 300 mg, 300 mg, Oral, TID, Jose Almanza MD, 300 mg at 05/23/18 0917    Assessment/Plan     Active Hospital Problems (** Indicates Principal Problem)    Diagnosis Date Noted   • **Acute ITP [D69.3] 05/18/2018   • Fever [R50.9] 05/17/2018   • Secondary esophageal varices without bleeding [I85.10] 02/22/2017   • Systemic lupus [M32.9] 02/22/2017   • Hematemesis [K92.0] 02/15/2017   • DUKES (nonalcoholic steatohepatitis) [K75.81] 06/14/2016   • Type 2 diabetes mellitus, uncontrolled, with neuropathy [E11.40, E11.65] 03/15/2016   • Cirrhosis of liver [K74.60] 03/08/2016   • Rheumatoid arthritis [M06.9] 03/08/2016   • Anxiety and depression [F41.8] 03/08/2016      Resolved Hospital Problems    Diagnosis Date Noted Date Resolved   • Thrombocytopenia [D69.6] 05/16/2018 05/18/2018   • Secondary esophageal  "varices with bleeding [I85.11] 03/07/2017 05/17/2018     Type 2 dm - Severely uncontrolled BG.   Patient's blood sugars are still elevated could be related to steroids.  Patient will be discharged on prednisone 60 mg oral daily.  She would remain on this dose for the next 1 week according to hematology oncology.    Discharge instructions from endocrine  Patient will be seen by me in clinic in the next 2 weeks.  Patient will resume her U-500 insulin  U - 500 insulin 60 units with each meal and 32 units with snacks when on prednisone 60 mg po daily.   Please call office and let us know if the steroid dose is decreased.     Hyperglycemia secondary to steroids  Patient will be discharged on prednisone 60 mg daily for the next 1 week at least  Discussing with the hematologist oncologist patient will remain on steroids for the next 8-12 weeks until her platelet count improves.    Discussed the plan with the nurse.    Merary Burnett MD.  05/23/18  11:06 AM      EMR Dragon / transcription disclaimer:    \"Dictated utilizing Dragon dictation\".   "

## 2018-05-23 NOTE — PROGRESS NOTES
Discharge instructions from endocrine     Patient will be seen by me in clinic in the next 2 weeks.  Patient will resume her U-500 insulin  U - 500 insulin 60 units with each meal and 32 units with snacks when on prednisone 60 mg po daily.   Please call office and let us know if the steroid dose is decreased.

## 2018-05-23 NOTE — PROGRESS NOTES
Continued Stay Note   [facility]     Patient Name: Silvia Zabala  MRN: [id number]  Today's Date: 5/23/2018    Admit Date: 5/16/2018          Discharge Plan     Row Name 05/23/18 1354       Plan    Plan Comments Verified plan is home Pt denies needs              Discharge Codes    No documentation.           Amber Mike RN

## 2018-05-23 NOTE — DISCHARGE SUMMARY
[facility]    ASSOCIATES  [phone]    DISCHARGE SUMMARY  [facility]    Patient Identification:  Name: Silvia Zabala  Age: 57 y.o.  Sex: female  :  1960  MRN: [id number]  Primary Care Physician: Blanca Pitts MD    Admit date: 2018  Discharge date and time:      Discharge Diagnoses:Principal Problem:    Acute ITP  Active Problems:    Cirrhosis of liver    Rheumatoid arthritis    Anxiety and depression    Type 2 diabetes mellitus, uncontrolled, with neuropathy    DUKES (nonalcoholic steatohepatitis)    Hematemesis    Systemic lupus    Secondary esophageal varices without bleeding    Fever       History of present illness from H&P:      56yo female with DUKES, rheumatoid arthritis, thrombocytopenia, lupus, type 2 diabetes, previous GI bleed from esophageal varices who comes a hospital because 3 days prior to hospitalization she had severe nosebleeds.  She was able to get nosebleeds under control.  And then on the day of admission the patient was vomiting blood.  The patient's unsure if the blood that she now vomiting coming from swallowing blood coming from her nose or if it's coming from a GI source.  The patient has had no more vomiting of blood in the emergency room.  The patient was found to have platelets of 6.  The patient was transfused a unit of platelets in the emergency room.  The oncology team has been consulted on the patient.     Patient also complains of fevers and chills up to 101 over the last couple months.  The patient is trying to get to a 48 hour time period fever free so that she can start back on her Enbrel.     Patient has a history of seizures or last seizures was many years ago.  Her seizures started in her adult life.  Patient's currently taking Keppra and well controlled on this.     Patient takes Atarax for itching presumably related to the patient's cirrhosis from [place].          Hospital Course:     Patient is mentioned had very low  platelets on admission and was given IVIG ×5 doses and steroids high-dose and has been transitioned to high-dose oral medication.  Hematology feels the patient has ITP.  There are no schistocytes and no platelet pumping.  The patient's platelets are now up to 53 time a discharge.  Patient has had no more epistaxis or GI bleeding.    The patient has had a headache throughout the hospitalization and this is slightly improved.  The patient's appetite during the hospitalization has been diminished but has improved over the last 24 hours.      Her hospital course by problem list:    Acute ITP-plts better at 54, IVIG, prednisone 75       DUKES, Cirrhosis of liver       Rheumatoid arthritis  -followed by Dr Munoz  -trying to get back on embrel but having fevers   -no joint deformity but complains of pain in the hands      Anxiety and depression       Type 2 diabetes mellitus, uncontrolled, with neuropathy  -endocrinology is following  -prior to admission she was on U 500 40-55 units with meals tid at home    -Await a final recommendation from endocrinology for discharge insulin dose.      Hematemesis- none since admission       Systemic lupus       Secondary esophageal varices without bleeding       Fever-temp curve is normal during the hospitalization  -wbc is low and stable       Consults:     Consults     Date and Time Order Name Status Description    5/17/2018 1114 Inpatient Endocrinology Consult Completed     5/16/2018 2336 Inpatient Infectious Diseases Consult Completed     5/16/2018 2328 Inpatient Gastroenterology Consult      5/16/2018 2328 Hematology & Oncology Inpatient Consult Completed     5/16/2018 2251 Hematology and Oncology (on-call MD unless specified) Completed     5/16/2018 2157 Hematology and Oncology (on-call MD unless specified) Completed     5/16/2018 2147 [facility] (on-call MD unless specified) Completed             Results from last 7 days  Lab Units 05/23/18  0503   WBC 10*3/mm3 2.84*   HEMOGLOBIN  g/dL 9.9*   HEMATOCRIT % 30.8*   PLATELETS 10*3/mm3 53*         Results from last 7 days  Lab Units 05/23/18  0503   SODIUM mmol/L 137   POTASSIUM mmol/L 4.3   CHLORIDE mmol/L 101   CO2 mmol/L 24.8   BUN mg/dL 16   CREATININE mg/dL 0.81   GLUCOSE mg/dL 216*   CALCIUM mg/dL 9.2       Significant Diagnostic Studies:   Lab Results   Component Value Date    WBC 2.84 (L) 05/23/2018    HGB 9.9 (L) 05/23/2018    HCT 30.8 (L) 05/23/2018    PLT 53 (L) 05/23/2018     Lab Results   Component Value Date     05/23/2018    K 4.3 05/23/2018     05/23/2018    CO2 24.8 05/23/2018    BUN 16 05/23/2018    CREATININE 0.81 05/23/2018    GLUCOSE 216 (H) 05/23/2018     Lab Results   Component Value Date    CALCIUM 9.2 05/23/2018     No results found for: AST, ALT, ALKPHOS  No results found for: APTT, INR  No results found for: COLORU, CLARITYU, SPECGRAV, PHUR, PROTEINUR, GLUCOSEU, KETONESU, BLOODU, NITRITE, LEUKOCYTESUR, BILIRUBINUR, UROBILINOGEN, RBCUA, WBCUA, BACTERIA  No results found for: TROPONINT, TROPONINI, BNP  No components found for: HGBA1C;2  No components found for: TSH;2    Imaging Results (all)     Procedure Component Value Units Date/Time    CT Chest Without Contrast [[id number]] Collected:  05/17/18 1433     Updated:  05/17/18 1441    Narrative:       CT CHEST WO CONTRAST-     CLINICAL HISTORY: Dyspnea. Chest pain. Fever.     TECHNIQUE: Spiral CT images were obtained through the chest without IV  contrast and were reconstructed in 3 mm thick slices.     Radiation dose reduction techniques were utilized, including automated  exposure control and exposure modulation based on body size.     COMPARISON: Chest x-ray dated 3/30/2018.     FINDINGS: There is minimal pleural and parenchymal scarring at the left  lung apex. The lungs are otherwise clear. There are no infiltrates or  discrete lung masses. There is no mediastinal or hilar or axillary  lymphadenopathy. There are no pleural effusions. Images through  the  upper abdomen show mild diffuse injection of the peripancreatic fat that  could indicate acute pancreatitis. Correlation with clinical findings is  recommended.       Impression:       Unremarkable CT scan of the chest except for equivocal  evidence of pancreatitis in the upper abdomen as noted.     This report was finalized on 5/17/2018 2:38 PM by Dr. Marco Ramos M.D.             TEST  RESULTS PENDING AT DISCHARGE      Patient Instructions:    Silvia Zabala   Home Medication Instructions MEGHANN:[id number]    Printed on:05/23/18 9156   Medication Information                      acetaminophen (TYLENOL) 325 MG tablet  Take 2 tablets by mouth Every 4 (Four) Hours As Needed for Mild Pain .             ALPRAZolam (XANAX) 0.5 MG tablet  TAKE ONE TABLET BY MOUTH TWICE A DAY AS NEEDED FOR ANXIETY             buPROPion XL (WELLBUTRIN XL) 150 MG 24 hr tablet  TAKE ONE TABLET BY MOUTH DAILY             clotrimazole (MYCELEX) 10 MG omer  Take 1 tablet by mouth 5 (Five) Times a Day.             cyclobenzaprine (FLEXERIL) 5 MG tablet  Take 1 tablet by mouth 3 (Three) Times a Day As Needed for Muscle Spasms.             cycloSPORINE (RESTASIS) 0.05 % ophthalmic emulsion  1 drop 2 (two) times a day.             DULoxetine (CYMBALTA) 30 MG capsule  TAKE THREE CAPSULES BY MOUTH DAILY             ferrous sulfate 325 (65 FE) MG tablet  TAKE ONE TABLET BY MOUTH TWICE A DAY             folic acid (FOLVITE) 1 MG tablet  Take 1 mg by mouth Daily.             hydrOXYzine (ATARAX) 25 MG tablet  Take 25 mg by mouth 3 (Three) Times a Day As Needed for Itching.             Insulin Pen Needle 31G X 5 MM misc  To inject 5 -6 times per day             Insulin Regular Human, Conc, (HUMULIN R U-500 KWIKPEN) 500 UNIT/ML solution pen-injector CONCENTRATED injection  55 units with each meal plus the sliding scale. And use the ssi.             levETIRAcetam (KEPPRA) 500 MG tablet  TAKE ONE TABLET BY MOUTH TWICE A DAY             LYRICA 75  MG capsule  TAKE ONE CAPSULE BY MOUTH TWICE A DAY             Multiple Vitamin (MULTI-VITAMIN PO)  Take 1 tablet by mouth Daily.             OxyCODONE HCl (OXAYDO) 7.5 MG tablet   Take 7.5 mg by mouth Every 8 (Eight) Hours As Needed.             pantoprazole (PROTONIX) 40 MG EC tablet  TAKE ONE TABLET BY MOUTH DAILY             predniSONE (DELTASONE) 5 MG tablet  Take 12 tablets by mouth Daily With Breakfast for 30 days.             promethazine (PHENERGAN) 25 MG tablet  TAKE ONE TABLET BY MOUTH EVERY 6 HOURS AS NEEDED FOR NAUSEA OR VOMITING             RELISTOR 150 MG tablet  450 mg Daily.             spironolactone (ALDACTONE) 100 MG tablet  TAKE ONE TABLET BY MOUTH DAILY             sucralfate (CARAFATE) 1 g tablet  Take 1 tablet by mouth 2 (Two) Times a Day.             trimethobenzamide (TIGAN) 300 MG capsule  Take 300 mg by mouth 3 (Three) Times a Day.             vitamin B-12 (CYANOCOBALAMIN) 1000 MCG tablet  Take 1,000 mcg by mouth daily.             XIFAXAN 550 MG tablet  TAKE ONE TABLET BY MOUTH TWICE A DAY               Future Appointments  Date Time Provider Department Center   6/19/2018 2:45 PM Rich Coleman MD [facility] None   6/25/2018 1:00 PM Blanca Pitts MD [facility] None   7/6/2018 10:00 AM Sujata Salas MD [facility] None   8/13/2018 12:40 PM [facility] None   8/27/2018 12:40 PM Merary Burnett MD [facility] None       Discharge Order     None        F/u PMD in one week  F/u Endo 2-3 weeks  F/u CBC in 2-3 weeks    CBC and CMP with PMD in 1 week    Total time spent discharging patient including evaluation, post hospitalization follow up,  medication and post hospitalization instructions and education, total time exceeds 30 minutes.    Signed:  Jose Almanza MD  5/23/2018  8:24 AM

## 2018-05-24 ENCOUNTER — DOCUMENTATION (OUTPATIENT)
Dept: INFECTIOUS DISEASES | Facility: CLINIC | Age: 58
End: 2018-05-24

## 2018-05-24 NOTE — PAYOR COMM NOTE
"Silvia Luther (57 y.o. Female)     PLEASE SEE ATTACHED SUMMARY    REF#[id number]    THANK YOU    SRINI DOMINGO LPN CCP    Date of Birth Social Security Number Address Home Phone MRN    1960  [id number]  [address] [address] [phone] [id number]    Hinduism Marital Status          None        Admission Date Admission Type Admitting Provider Attending Provider Department, Room/Bed    5/16/18 Emergency Jose Almanza MD  [address], 440/1    Discharge Date Discharge Disposition Discharge Destination        5/23/2018 Home or Self Care              Attending Provider:  (none)   Allergies:  Albuterol, Lactulose, Tramadol, Quinine Derivatives    Isolation:  None   Infection:  None   Code Status:  Prior    Ht:  167.6 cm (66\")   Wt:  75.8 kg (167 lb 1.6 oz)    Admission Cmt:  None   Principal Problem:  Acute ITP [D69.3]                 Active Insurance as of 5/16/2018     Primary Coverage     Payor Plan Insurance Group Employer/Plan Group    [facility] [employer] [facility] [employer] [facility] [id number]     Payor Plan Address Payor Plan Phone Number Effective From Effective To    [address] [phone] 1/1/2015     [facility] [id number]       Subscriber Name Subscriber Birth Date Member ID       JOSE LUTHER 7/[birth year]/1970 [id number]           Secondary Coverage     Payor Plan Insurance Group Employer/Plan Group    MEDICARE MEDICARE A & B      Payor Plan Address Payor Plan Phone Number Effective From Effective To    [address] [phone] 9/1/2003     [facility] [id number]       Subscriber Name Subscriber Birth Date Member ID       SILVIA LUTHER 1960 [id number]                 Emergency Contacts      (Rel.) Home Phone Work Phone Mobile Phone    Jose Luther (Spouse) [phone] -- --    VjKary suero (Mother In Law) (Relative) [phone] -- --    Mack Lora (Father) -- -- [phone]               Discharge " Summary      Jose Almanza MD at 2018  8:24 AM                       [facility]    ASSOCIATES  [phone]    DISCHARGE SUMMARY  [facility]    Patient Identification:  Name: Silvia Zabala  Age: 57 y.o.  Sex: female  :  1960  MRN: [id number]  Primary Care Physician: Blanca Pitts MD    Admit date: 2018  Discharge date and time:      Discharge Diagnoses:Principal Problem:    Acute ITP  Active Problems:    Cirrhosis of liver    Rheumatoid arthritis    Anxiety and depression    Type 2 diabetes mellitus, uncontrolled, with neuropathy    DUKES (nonalcoholic steatohepatitis)    Hematemesis    Systemic lupus    Secondary esophageal varices without bleeding    Fever       History of present illness from H&P:      56yo female with DUKES, rheumatoid arthritis, thrombocytopenia, lupus, type 2 diabetes, previous GI bleed from esophageal varices who comes a hospital because 3 days prior to hospitalization she had severe nosebleeds.  She was able to get nosebleeds under control.  And then on the day of admission the patient was vomiting blood.  The patient's unsure if the blood that she now vomiting coming from swallowing blood coming from her nose or if it's coming from a GI source.  The patient has had no more vomiting of blood in the emergency room.  The patient was found to have platelets of 6.  The patient was transfused a unit of platelets in the emergency room.  The oncology team has been consulted on the patient.     Patient also complains of fevers and chills up to 101 over the last couple months.  The patient is trying to get to a 48 hour time period fever free so that she can start back on her Enbrel.     Patient has a history of seizures or last seizures was many years ago.  Her seizures started in her adult life.  Patient's currently taking Keppra and well controlled on this.     Patient takes Atarax for itching presumably related to the patient's cirrhosis from  [place].          Hospital Course:     Patient is mentioned had very low platelets on admission and was given IVIG × 5 doses and steroids high-dose and has been transitioned to high-dose oral medication.  Hematology feels the patient has ITP.  There are no schistocytes and no platelet pumping.  The patient's platelets are now up to 53 time a discharge.  Patient has had no more epistaxis or GI bleeding.    The patient has had a headache throughout the hospitalization and this is slightly improved.  The patient's appetite during the hospitalization has been diminished but has improved over the last 24 hours.      Her hospital course by problem list:    Acute ITP-plts better at 54, IVIG, prednisone 75       DUKES, Cirrhosis of liver       Rheumatoid arthritis  -followed by Dr Munoz  -trying to get back on embrel but having fevers   -no joint deformity but complains of pain in the hands      Anxiety and depression       Type 2 diabetes mellitus, uncontrolled, with neuropathy  -endocrinology is following  -prior to admission she was on U 500 40-55 units with meals tid at home    -Await a final recommendation from endocrinology for discharge insulin dose.      Hematemesis- none since admission       Systemic lupus       Secondary esophageal varices without bleeding       Fever-temp curve is normal during the hospitalization  -wbc is low and stable       Consults:     Consults     Date and Time Order Name Status Description    5/17/2018 1114 Inpatient Endocrinology Consult Completed     5/16/2018 2336 Inpatient Infectious Diseases Consult Completed     5/16/2018 2328 Inpatient Gastroenterology Consult      5/16/2018 2328 Hematology & Oncology Inpatient Consult Completed     5/16/2018 2251 Hematology and Oncology (on-call MD unless specified) Completed     5/16/2018 2157 Hematology and Oncology (on-call MD unless specified) Completed     5/16/2018 2147 [facility] (on-call MD unless specified) Completed             Results from  last 7 days  Lab Units 05/23/18  0503   WBC 10*3/mm3 2.84*   HEMOGLOBIN g/dL 9.9*   HEMATOCRIT % 30.8*   PLATELETS 10*3/mm3 53*         Results from last 7 days  Lab Units 05/23/18  0503   SODIUM mmol/L 137   POTASSIUM mmol/L 4.3   CHLORIDE mmol/L 101   CO2 mmol/L 24.8   BUN mg/dL 16   CREATININE mg/dL 0.81   GLUCOSE mg/dL 216*   CALCIUM mg/dL 9.2       Significant Diagnostic Studies:   Lab Results   Component Value Date    WBC 2.84 (L) 05/23/2018    HGB 9.9 (L) 05/23/2018    HCT 30.8 (L) 05/23/2018    PLT 53 (L) 05/23/2018     Lab Results   Component Value Date     05/23/2018    K 4.3 05/23/2018     05/23/2018    CO2 24.8 05/23/2018    BUN 16 05/23/2018    CREATININE 0.81 05/23/2018    GLUCOSE 216 (H) 05/23/2018     Lab Results   Component Value Date    CALCIUM 9.2 05/23/2018     No results found for: AST, ALT, ALKPHOS  No results found for: APTT, INR  No results found for: COLORU, CLARITYU, SPECGRAV, PHUR, PROTEINUR, GLUCOSEU, KETONESU, BLOODU, NITRITE, LEUKOCYTESUR, BILIRUBINUR, UROBILINOGEN, RBCUA, WBCUA, BACTERIA  No results found for: TROPONINT, TROPONINI, BNP  No components found for: HGBA1C;2  No components found for: TSH;2    Imaging Results (all)     Procedure Component Value Units Date/Time    CT Chest Without Contrast [[id number]] Collected:  05/17/18 1433     Updated:  05/17/18 1441    Narrative:       CT CHEST WO CONTRAST-     CLINICAL HISTORY: Dyspnea. Chest pain. Fever.     TECHNIQUE: Spiral CT images were obtained through the chest without IV  contrast and were reconstructed in 3 mm thick slices.     Radiation dose reduction techniques were utilized, including automated  exposure control and exposure modulation based on body size.     COMPARISON: Chest x-ray dated 3/30/2018.     FINDINGS: There is minimal pleural and parenchymal scarring at the left  lung apex. The lungs are otherwise clear. There are no infiltrates or  discrete lung masses. There is no mediastinal or hilar or  axillary  lymphadenopathy. There are no pleural effusions. Images through the  upper abdomen show mild diffuse injection of the peripancreatic fat that  could indicate acute pancreatitis. Correlation with clinical findings is  recommended.       Impression:       Unremarkable CT scan of the chest except for equivocal  evidence of pancreatitis in the upper abdomen as noted.     This report was finalized on 5/17/2018 2:38 PM by Dr. Marco Ramos M.D.             TEST  RESULTS PENDING AT DISCHARGE      Patient Instructions:    Silvia Zabala   Home Medication Instructions MEGHANN:[id number]    Printed on:05/23/18 0600   Medication Information                      acetaminophen (TYLENOL) 325 MG tablet  Take 2 tablets by mouth Every 4 (Four) Hours As Needed for Mild Pain .             ALPRAZolam (XANAX) 0.5 MG tablet  TAKE ONE TABLET BY MOUTH TWICE A DAY AS NEEDED FOR ANXIETY             buPROPion XL (WELLBUTRIN XL) 150 MG 24 hr tablet  TAKE ONE TABLET BY MOUTH DAILY             clotrimazole (MYCELEX) 10 MG omer  Take 1 tablet by mouth 5 (Five) Times a Day.             cyclobenzaprine (FLEXERIL) 5 MG tablet  Take 1 tablet by mouth 3 (Three) Times a Day As Needed for Muscle Spasms.             cycloSPORINE (RESTASIS) 0.05 % ophthalmic emulsion  1 drop 2 (two) times a day.             DULoxetine (CYMBALTA) 30 MG capsule  TAKE THREE CAPSULES BY MOUTH DAILY             ferrous sulfate 325 (65 FE) MG tablet  TAKE ONE TABLET BY MOUTH TWICE A DAY             folic acid (FOLVITE) 1 MG tablet  Take 1 mg by mouth Daily.             hydrOXYzine (ATARAX) 25 MG tablet  Take 25 mg by mouth 3 (Three) Times a Day As Needed for Itching.             Insulin Pen Needle 31G X 5 MM misc  To inject 5 -6 times per day             Insulin Regular Human, Conc, (HUMULIN R U-500 KWIKPEN) 500 UNIT/ML solution pen-injector CONCENTRATED injection  55 units with each meal plus the sliding scale. And use the ssi.             levETIRAcetam (KEPPRA)  500 MG tablet  TAKE ONE TABLET BY MOUTH TWICE A DAY             LYRICA 75 MG capsule  TAKE ONE CAPSULE BY MOUTH TWICE A DAY             Multiple Vitamin (MULTI-VITAMIN PO)  Take 1 tablet by mouth Daily.             OxyCODONE HCl (OXAYDO) 7.5 MG tablet   Take 7.5 mg by mouth Every 8 (Eight) Hours As Needed.             pantoprazole (PROTONIX) 40 MG EC tablet  TAKE ONE TABLET BY MOUTH DAILY             predniSONE (DELTASONE) 5 MG tablet  Take 12 tablets by mouth Daily With Breakfast for 30 days.             promethazine (PHENERGAN) 25 MG tablet  TAKE ONE TABLET BY MOUTH EVERY 6 HOURS AS NEEDED FOR NAUSEA OR VOMITING             RELISTOR 150 MG tablet  450 mg Daily.             spironolactone (ALDACTONE) 100 MG tablet  TAKE ONE TABLET BY MOUTH DAILY             sucralfate (CARAFATE) 1 g tablet  Take 1 tablet by mouth 2 (Two) Times a Day.             trimethobenzamide (TIGAN) 300 MG capsule  Take 300 mg by mouth 3 (Three) Times a Day.             vitamin B-12 (CYANOCOBALAMIN) 1000 MCG tablet  Take 1,000 mcg by mouth daily.             XIFAXAN 550 MG tablet  TAKE ONE TABLET BY MOUTH TWICE A DAY               Future Appointments  Date Time Provider Department Center   6/19/2018 2:45 PM Rich Coleman MD [facility] None   6/25/2018 1:00 PM Blanca Pitts MD [facility] None   7/6/2018 10:00 AM Sujata Salas MD [facility] None   8/13/2018 12:40 PM [facility] None   8/27/2018 12:40 PM Merary Burnett MD [facility] None       Discharge Order     None        F/u PMD in one week  F/u Endo 2-3 weeks  F/u CBC in 2-3 weeks    CBC and CMP with PMD in 1 week    Total time spent discharging patient including evaluation, post hospitalization follow up,  medication and post hospitalization instructions and education, total time exceeds 30 minutes.    Signed:  Jose Almanza MD  5/23/2018  8:24 AM    Electronically signed by Jose Almanza MD at 5/23/2018  3:39 PM

## 2018-05-30 ENCOUNTER — OFFICE VISIT (OUTPATIENT)
Dept: INTERNAL MEDICINE | Facility: CLINIC | Age: 58
End: 2018-05-30

## 2018-05-30 VITALS
TEMPERATURE: 98.7 F | DIASTOLIC BLOOD PRESSURE: 78 MMHG | HEART RATE: 89 BPM | BODY MASS INDEX: 26.63 KG/M2 | WEIGHT: 165 LBS | OXYGEN SATURATION: 99 % | SYSTOLIC BLOOD PRESSURE: 128 MMHG

## 2018-05-30 DIAGNOSIS — K31.84 GASTROPARESIS: ICD-10-CM

## 2018-05-30 DIAGNOSIS — IMO0002 TYPE 2 DIABETES MELLITUS, UNCONTROLLED, WITH NEUROPATHY: ICD-10-CM

## 2018-05-30 DIAGNOSIS — D69.3 ACUTE ITP (HCC): Primary | ICD-10-CM

## 2018-05-30 PROBLEM — R56.9 SEIZURE (HCC): Status: ACTIVE | Noted: 2018-05-30

## 2018-05-30 PROBLEM — M51.37 DEGENERATION OF LUMBOSACRAL INTERVERTEBRAL DISC: Status: ACTIVE | Noted: 2018-05-30

## 2018-05-30 PROBLEM — M47.819 SPONDYLOSIS WITHOUT MYELOPATHY: Status: ACTIVE | Noted: 2018-05-30

## 2018-05-30 LAB
ALBUMIN SERPL-MCNC: 3.4 G/DL (ref 3.5–5.2)
ALBUMIN/GLOB SERPL: 0.8 G/DL
ALP SERPL-CCNC: 153 U/L (ref 39–117)
ALT SERPL-CCNC: 112 U/L (ref 1–33)
AST SERPL-CCNC: 89 U/L (ref 1–32)
BASOPHILS # BLD AUTO: 0 10*3/MM3 (ref 0–0.2)
BASOPHILS NFR BLD AUTO: 0 % (ref 0–1.5)
BILIRUB SERPL-MCNC: 0.9 MG/DL (ref 0.1–1.2)
BUN SERPL-MCNC: 10 MG/DL (ref 6–20)
BUN/CREAT SERPL: 13.3 (ref 7–25)
CALCIUM SERPL-MCNC: 9.1 MG/DL (ref 8.6–10.5)
CHLORIDE SERPL-SCNC: 99 MMOL/L (ref 98–107)
CO2 SERPL-SCNC: 28.8 MMOL/L (ref 22–29)
CREAT SERPL-MCNC: 0.75 MG/DL (ref 0.57–1)
DEPRECATED RDW RBC AUTO: 63.3 FL (ref 37–54)
EOSINOPHIL # BLD AUTO: 0.01 10*3/MM3 (ref 0–0.7)
EOSINOPHIL NFR BLD AUTO: 0.2 % (ref 0.3–6.2)
ERYTHROCYTE [DISTWIDTH] IN BLOOD BY AUTOMATED COUNT: 18.7 % (ref 11.7–13)
GLOBULIN SER CALC-MCNC: 4.2 GM/DL
GLUCOSE SERPL-MCNC: 184 MG/DL (ref 65–99)
HCT VFR BLD AUTO: 37.3 % (ref 35.6–45.5)
HGB BLD-MCNC: 11.5 G/DL (ref 11.9–15.5)
IMM GRANULOCYTES # BLD: 0.04 10*3/MM3 (ref 0–0.03)
IMM GRANULOCYTES NFR BLD: 0.9 % (ref 0–0.5)
LYMPHOCYTES # BLD AUTO: 0.89 10*3/MM3 (ref 0.9–4.8)
LYMPHOCYTES NFR BLD AUTO: 20.3 % (ref 19.6–45.3)
MCH RBC QN AUTO: 28.8 PG (ref 26.9–32)
MCHC RBC AUTO-ENTMCNC: 30.8 G/DL (ref 32.4–36.3)
MCV RBC AUTO: 93.5 FL (ref 80.5–98.2)
MONOCYTES # BLD AUTO: 0.25 10*3/MM3 (ref 0.2–1.2)
MONOCYTES NFR BLD AUTO: 5.7 % (ref 5–12)
NEUTROPHILS # BLD AUTO: 3.19 10*3/MM3 (ref 1.9–8.1)
NEUTROPHILS NFR BLD AUTO: 72.9 % (ref 42.7–76)
PLATELET # BLD AUTO: 110 10*3/MM3 (ref 140–500)
PMV BLD AUTO: 11.3 FL (ref 6–12)
POTASSIUM SERPL-SCNC: 3.7 MMOL/L (ref 3.5–5.2)
PROT SERPL-MCNC: 7.6 G/DL (ref 6–8.5)
RBC # BLD AUTO: 3.99 10*6/MM3 (ref 3.9–5.2)
SODIUM SERPL-SCNC: 138 MMOL/L (ref 136–145)
WBC NRBC COR # BLD: 4.38 10*3/MM3 (ref 4.5–10.7)

## 2018-05-30 PROCEDURE — 85025 COMPLETE CBC W/AUTO DIFF WBC: CPT | Performed by: NURSE PRACTITIONER

## 2018-05-30 PROCEDURE — 99213 OFFICE O/P EST LOW 20 MIN: CPT | Performed by: NURSE PRACTITIONER

## 2018-05-30 NOTE — PROGRESS NOTES
Tali Zabala is a 57 y.o. female.     She was admitted to the hospital on 5/16/2018 and discharged on 5/23/2018. She was admitted for acute ITP. She is due to see hematologist and endocrinologist. She reports no active signs of bleeding. She remains with chronic abdominal pain and intermittent vomiting.          The following portions of the patient's history were reviewed and updated as appropriate: allergies, current medications, past family history, past medical history, past social history, past surgical history and problem list.    Review of Systems   Constitutional: Negative for activity change and appetite change.   HENT: Negative for nosebleeds (resolved ).    Respiratory: Negative for shortness of breath and wheezing.    Cardiovascular: Positive for leg swelling (resolved ). Negative for palpitations.   Gastrointestinal: Positive for abdominal pain (chronic ) and vomiting (chronic ). Negative for blood in stool.   Neurological: Negative for dizziness (resolved ).   Hematological: Does not bruise/bleed easily.   Psychiatric/Behavioral: The patient is nervous/anxious (lots of stressors ).        Objective   Physical Exam   Constitutional: She is oriented to person, place, and time. She appears well-developed and well-nourished.   HENT:   Head: Normocephalic.   Nose: Nose normal.   Cardiovascular: Regular rhythm and normal heart sounds.  Exam reveals no S3 and no S4.    No murmur heard.  Pulses:       Dorsalis pedis pulses are 2+ on the right side, and 2+ on the left side.        Posterior tibial pulses are 2+ on the right side, and 2+ on the left side.   Repeat bp left arm 128/78  No pedal edema    Pulmonary/Chest: Effort normal and breath sounds normal. She has no decreased breath sounds. She has no wheezes. She has no rhonchi. She has no rales.   Abdominal: Normal appearance and bowel sounds are normal. There is generalized tenderness.   Musculoskeletal: She exhibits no edema.    Neurological: She is alert and oriented to person, place, and time. Gait normal.   Skin: Skin is warm and dry. No petechiae noted. No erythema.   Psychiatric: She has a normal mood and affect.       Assessment/Plan   Silvia was seen today for thrombocytopenia.    Diagnoses and all orders for this visit:    Acute ITP  -     CBC Auto Differential; Future  -     Comprehensive Metabolic Panel; Future  -     CBC Auto Differential  -     Comprehensive Metabolic Panel    Gastroparesis  Comments:  chronic     Type 2 diabetes mellitus, uncontrolled, with neuropathy  Comments:  due to see endocrinologist       I reviewed recent lab work and imaging report from hospital with patient. Will check labs today. She has been instructed to schedule follow up appointments with hematologist and endocrinologist.

## 2018-06-08 ENCOUNTER — OFFICE VISIT (OUTPATIENT)
Dept: ENDOCRINOLOGY | Age: 58
End: 2018-06-08

## 2018-06-08 VITALS
HEART RATE: 106 BPM | BODY MASS INDEX: 29.51 KG/M2 | SYSTOLIC BLOOD PRESSURE: 136 MMHG | HEIGHT: 67 IN | WEIGHT: 188 LBS | DIASTOLIC BLOOD PRESSURE: 82 MMHG | OXYGEN SATURATION: 98 %

## 2018-06-08 DIAGNOSIS — E78.5 HYPERLIPIDEMIA, UNSPECIFIED HYPERLIPIDEMIA TYPE: ICD-10-CM

## 2018-06-08 DIAGNOSIS — IMO0002 UNCONTROLLED TYPE 2 DIABETES MELLITUS WITH COMPLICATION, WITH LONG-TERM CURRENT USE OF INSULIN: Primary | ICD-10-CM

## 2018-06-08 DIAGNOSIS — K75.81 NASH (NONALCOHOLIC STEATOHEPATITIS): ICD-10-CM

## 2018-06-08 DIAGNOSIS — K31.84 GASTROPARESIS: ICD-10-CM

## 2018-06-08 DIAGNOSIS — E78.49 OTHER HYPERLIPIDEMIA: ICD-10-CM

## 2018-06-08 LAB — HBA1C MFR BLD: 8.39 % (ref 4.8–5.6)

## 2018-06-08 PROCEDURE — 99214 OFFICE O/P EST MOD 30 MIN: CPT | Performed by: INTERNAL MEDICINE

## 2018-06-08 NOTE — PROGRESS NOTES
57 y.o.    Patient Care Team:  Blanca Pitts MD as PCP - General (Internal Medicine)  Sukhdeep Mcdowell MD as Consulting Physician (Hematology and Oncology)  Blanca Pitts MD as Referring Physician (Internal Medicine)  Rich Coleman MD as Consulting Physician (Gastroenterology)  Merary Burnett MD as Consulting Physician (Endocrinology)    Chief Complaint:    2 WEEK HOSPITAL FOLLOW UP/TYPE 2 DIABETES MELLITUS  Subjective     HPI    57-year-old white female with a complicated past medical history presents with 3 days of severe for nosebleeds followed with 1 episode of hematemesis.  Patient is currently being followed by hematology oncology.  Endocrinology has been consulted due to the elevated blood sugars.  She is a patient of mine for the management of her diabetes.     Type 2 diabetes mellitus  Diagnosed in her 20s.  Patient has been on insulin since then.  In April 2018 patient was started on U-500 insulin due to her significantly elevated blood sugars.  At home currently patient is on U-500 insulin 60 units with each meal and 30 - 35 units with snack, high dose sliding scale with each meal and snack.  She has been approved for DEXCOM.   Patient has been checking her blood sugars only 2 times a day.  Average blood sugars are around 200-300s range.  She also has low blood sugars which are low around 50 mg/dL range.  She did feel low blood sugar symptoms of extreme fatigue, increased sweating and weakness with brief episodes.  Highest blood sugar in the last few weeks was around 500 mg/dL range.  Last eye examination was in January 2018 and no history of diabetic retinopathy.  Does have history of diabetic neuropathy and is on Lyrica 75 mg twice daily.  Her diabetic diet intake is extremely limited due to a history of gastroparesis and also the fact that she does not have her dentures in.      Pancreatic divisum : No hx of pancreatitis per pt.       Liver disease/DUKES - She is required to eat 80 gm of protein  per day and she drinks two Glucerna shakes at bed time. Sees Dr. Jrodan from [facility].       Gastroparesis - Sees Dr. Coleman. Getting worse.         ITP - diagnosed in May 2018 when was hospitalized with hematemesis and low platelets. Currently on prednisone 60 mg po daily.   She is going to see Dr. Mcdowell in 2 - 3 weeks and at that time decision would be made regarding her steroids.     Patient was very tearful today in the clinic and has been concerned that she is getting sick every single day and that she might not be able to deal with all the clinical comorbidities that she is suffering with.    Reviewed primary care physician's/consulting physician documentation and lab results :     Interval History      The following portions of the patient's history were reviewed and updated as appropriate: allergies, current medications, past family history, past medical history, past social history, past surgical history and problem list.    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   • Cirrhosis    • Colon polyps    • Deafness    • Depression    • Diabetes mellitus     Adult onset, insulin requiring   • Duodenal ulcer with hemorrhage    • Esophageal varices determined by endoscopy    • Fibromyalgia    • Gallbladder disease    • Gastric varices    • Gastroparesis    • Glaucoma    • Granuloma annulare    • H/O B12 deficiency    • H/O Bleeding ulcer     And gastroesophageal varices   • H/O mixed connective tissue disorder    • Hemorrhoids    • Hiatal hernia    • History of transfusion    • Hx of being hospitalized     In [place] for GI bleeding from ulcer and varices   • Hyperlipidemia    • Migraine    • DUKES (nonalcoholic steatohepatitis) 11/2016   • BRICE (obstructive sleep apnea)    • Pancreas divisum    • Pancytopenia     Chronic, due to  "cirrhosis and hypersplenism   • Peptic ulcer disease     And esophageal varices   • PONV (postoperative nausea and vomiting)    • RA (rheumatoid arthritis)    • Seizure disorder     LAST ONE SEVERAL YEARS AGO   • Seizures    • Systemic lupus      Family History   Problem Relation Age of Onset   • Liver disease Other    • Depression Other    • Heart disease Other    • Hypertension Other    • Diabetes Other    • Breast cancer Other    • Brain cancer Other    • Uterine cancer Other    • Colon cancer Other    • Leukemia Other    • Colon cancer Maternal Aunt    • Hypertension Mother    • Diabetes type II Mother    • Rheum arthritis Mother    • Liver disease Mother         DUKES   • Heart disease Father    • Diabetes type II Father    • Diabetes type II Sister    • Lupus Sister    • Malig Hyperthermia Neg Hx      Social History     Social History   • Marital status:      Spouse name: Jose   • Number of children: N/A   • Years of education: N/A     Occupational History   •  Disabled     Social History Main Topics   • Smoking status: Former Smoker     Packs/day: 0.00     Years: 0.00     Quit date: 12/17/2015   • Smokeless tobacco: Never Used   • Alcohol use No   • Drug use: No   • Sexual activity: Defer     Other Topics Concern   • Not on file     Social History Narrative    Moved to [place] from [place]. She is currently medically disabled.     Allergies   Allergen Reactions   • Albuterol Anaphylaxis   • Lactulose Nausea And Vomiting and Other (See Comments)     Severe abdominal pain   • Tramadol Other (See Comments)     Per pt causes her \"palsy, meaning shaking and tremors\" and gi upset, nausea   • Quinine Derivatives Nausea And Vomiting       Current Outpatient Prescriptions:   •  acetaminophen (TYLENOL) 325 MG tablet, Take 2 tablets by mouth Every 4 (Four) Hours As Needed for Mild Pain ., Disp: , Rfl:   •  ALPRAZolam (XANAX) 0.5 MG tablet, TAKE ONE TABLET BY MOUTH TWICE A DAY AS NEEDED FOR ANXIETY, Disp: 60 " tablet, Rfl: 2  •  buPROPion XL (WELLBUTRIN XL) 150 MG 24 hr tablet, TAKE ONE TABLET BY MOUTH DAILY, Disp: 90 tablet, Rfl: 3  •  clotrimazole (MYCELEX) 10 MG omer, Take 1 tablet by mouth 5 (Five) Times a Day., Disp: 70 tablet, Rfl: 1  •  cyclobenzaprine (FLEXERIL) 5 MG tablet, Take 1 tablet by mouth 3 (Three) Times a Day As Needed for Muscle Spasms., Disp: , Rfl:   •  cycloSPORINE (RESTASIS) 0.05 % ophthalmic emulsion, 1 drop 2 (two) times a day., Disp: , Rfl:   •  DULoxetine (CYMBALTA) 30 MG capsule, TAKE THREE CAPSULES BY MOUTH DAILY, Disp: 90 capsule, Rfl: 3  •  ferrous sulfate 325 (65 FE) MG tablet, TAKE ONE TABLET BY MOUTH TWICE A DAY, Disp: 60 tablet, Rfl: 1  •  folic acid (FOLVITE) 1 MG tablet, Take 1 mg by mouth Daily., Disp: , Rfl:   •  hydrOXYzine (ATARAX) 25 MG tablet, Take 25 mg by mouth 3 (Three) Times a Day As Needed for Itching., Disp: , Rfl:   •  Insulin Pen Needle 31G X 5 MM misc, To inject 5 -6 times per day, Disp: 200 each, Rfl: 3  •  Insulin Regular Human, Conc, (HUMULIN R U-500 KWIKPEN) 500 UNIT/ML solution pen-injector CONCENTRATED injection, 60 units with each meal 32 units with snacks while on 60mg prednisone, Disp: 5 pen, Rfl: 3  •  levETIRAcetam (KEPPRA) 500 MG tablet, TAKE ONE TABLET BY MOUTH TWICE A DAY, Disp: 60 tablet, Rfl: 1  •  LYRICA 75 MG capsule, TAKE ONE CAPSULE BY MOUTH TWICE A DAY, Disp: 60 capsule, Rfl: 0  •  Multiple Vitamin (MULTI-VITAMIN PO), Take 1 tablet by mouth Daily., Disp: , Rfl:   •  OxyCODONE HCl (OXAYDO) 7.5 MG tablet , Take 7.5 mg by mouth Every 8 (Eight) Hours As Needed., Disp: , Rfl:   •  pantoprazole (PROTONIX) 40 MG EC tablet, TAKE ONE TABLET BY MOUTH DAILY, Disp: 30 tablet, Rfl: 4  •  predniSONE (DELTASONE) 5 MG tablet, Take 12 tablets by mouth Daily With Breakfast for 30 days., Disp: 360 tablet, Rfl: 0  •  promethazine (PHENERGAN) 25 MG tablet, TAKE ONE TABLET BY MOUTH EVERY 6 HOURS AS NEEDED FOR NAUSEA OR VOMITING, Disp: 30 tablet, Rfl: 0  •  RELISTOR 150  "MG tablet, 450 mg Daily., Disp: , Rfl:   •  spironolactone (ALDACTONE) 100 MG tablet, TAKE ONE TABLET BY MOUTH DAILY, Disp: 30 tablet, Rfl: 4  •  sucralfate (CARAFATE) 1 g tablet, Take 1 tablet by mouth 2 (Two) Times a Day., Disp: 60 tablet, Rfl: 2  •  trimethobenzamide (TIGAN) 300 MG capsule, Take 300 mg by mouth 3 (Three) Times a Day., Disp: , Rfl:   •  vitamin B-12 (CYANOCOBALAMIN) 1000 MCG tablet, Take 1,000 mcg by mouth daily., Disp: , Rfl:   •  XIFAXAN 550 MG tablet, TAKE ONE TABLET BY MOUTH TWICE A DAY, Disp: 60 tablet, Rfl: 4        Review of Systems   Constitutional: Positive for appetite change and fatigue. Negative for fever.   Eyes: Negative for visual disturbance.   Respiratory: Positive for shortness of breath.    Cardiovascular: Negative for palpitations and leg swelling.   Gastrointestinal: Positive for abdominal pain, diarrhea, nausea and vomiting.   Endocrine: Positive for polydipsia. Negative for polyuria.   Musculoskeletal: Positive for joint swelling. Negative for neck pain.   Skin: Negative for rash.   Neurological: Positive for numbness. Negative for weakness.   Psychiatric/Behavioral: Negative for behavioral problems.       Objective       Vitals:    06/08/18 1115   BP: 136/82   Pulse: 106   SpO2: 98%   Weight: 85.3 kg (188 lb)   Height: 168.9 cm (66.5\")     Body mass index is 29.89 kg/m².      Physical Exam   Constitutional: She is oriented to person, place, and time. She appears well-nourished.   Obese     HENT:   Head: Normocephalic and atraumatic.   No teeth   Eyes: Conjunctivae and EOM are normal. No scleral icterus.   Neck: Normal range of motion. Neck supple. No thyromegaly present.   Acanthosis nigricans   Cardiovascular: Normal rate and normal heart sounds.    Pulmonary/Chest: Effort normal and breath sounds normal. No stridor. She has no wheezes.   Abdominal: Soft. Bowel sounds are normal. She exhibits distension. There is tenderness.   Central obesity   Musculoskeletal: She " exhibits no edema or tenderness.   Neurological: She is alert and oriented to person, place, and time.   Skin: Skin is warm and dry. She is not diaphoretic.   Psychiatric: She has a normal mood and affect.   Vitals reviewed.    Results Review:     I reviewed the patient's new clinical results and mentioned them above in HPI and in plan as well.    Medical records reviewed  Summary: done      Office Visit on 05/30/2018   Component Date Value Ref Range Status   • Glucose 05/30/2018 184* 65 - 99 mg/dL Final   • BUN 05/30/2018 10  6 - 20 mg/dL Final   • Creatinine 05/30/2018 0.75  0.57 - 1.00 mg/dL Final   • eGFR Non African Am 05/30/2018 80  >60 mL/min/1.73 Final   • eGFR African Am 05/30/2018 97  >60 mL/min/1.73 Final   • BUN/Creatinine Ratio 05/30/2018 13.3  7.0 - 25.0 Final   • Sodium 05/30/2018 138  136 - 145 mmol/L Final   • Potassium 05/30/2018 3.7  3.5 - 5.2 mmol/L Final   • Chloride 05/30/2018 99  98 - 107 mmol/L Final   • Total CO2 05/30/2018 28.8  22.0 - 29.0 mmol/L Final   • Calcium 05/30/2018 9.1  8.6 - 10.5 mg/dL Final   • Total Protein 05/30/2018 7.6  6.0 - 8.5 g/dL Final   • Albumin 05/30/2018 3.40* 3.50 - 5.20 g/dL Final   • Globulin 05/30/2018 4.2  gm/dL Final   • A/G Ratio 05/30/2018 0.8  g/dL Final   • Total Bilirubin 05/30/2018 0.9  0.1 - 1.2 mg/dL Final   • Alkaline Phosphatase 05/30/2018 153* 39 - 117 U/L Final   • AST (SGOT) 05/30/2018 89* 1 - 32 U/L Final   • ALT (SGPT) 05/30/2018 112* 1 - 33 U/L Final   • WBC 05/30/2018 4.38* 4.50 - 10.70 10*3/mm3 Final   • RBC 05/30/2018 3.99  3.90 - 5.20 10*6/mm3 Final   • Hemoglobin 05/30/2018 11.5* 11.9 - 15.5 g/dL Final   • Hematocrit 05/30/2018 37.3  35.6 - 45.5 % Final   • MCV 05/30/2018 93.5  80.5 - 98.2 fL Final   • MCH 05/30/2018 28.8  26.9 - 32.0 pg Final   • MCHC 05/30/2018 30.8* 32.4 - 36.3 g/dL Final   • RDW 05/30/2018 18.7* 11.7 - 13.0 % Final   • RDW-SD 05/30/2018 63.3* 37.0 - 54.0 fl Final   • MPV 05/30/2018 11.3  6.0 - 12.0 fL Final   •  Platelets 05/30/2018 110* 140 - 500 10*3/mm3 Final   • Neutrophil % 05/30/2018 72.9  42.7 - 76.0 % Final   • Lymphocyte % 05/30/2018 20.3  19.6 - 45.3 % Final   • Monocyte % 05/30/2018 5.7  5.0 - 12.0 % Final   • Eosinophil % 05/30/2018 0.2* 0.3 - 6.2 % Final   • Basophil % 05/30/2018 0.0  0.0 - 1.5 % Final   • Immature Grans % 05/30/2018 0.9* 0.0 - 0.5 % Final   • Neutrophils, Absolute 05/30/2018 3.19  1.90 - 8.10 10*3/mm3 Final   • Lymphocytes, Absolute 05/30/2018 0.89* 0.90 - 4.80 10*3/mm3 Final   • Monocytes, Absolute 05/30/2018 0.25  0.20 - 1.20 10*3/mm3 Final   • Eosinophils, Absolute 05/30/2018 0.01  0.00 - 0.70 10*3/mm3 Final   • Basophils, Absolute 05/30/2018 0.00  0.00 - 0.20 10*3/mm3 Final   • Immature Grans, Absolute 05/30/2018 0.04* 0.00 - 0.03 10*3/mm3 Final     Lab Results   Component Value Date    HGBA1C 11.20 (H) 12/16/2017    HGBA1C 6.86 (H) 04/25/2017    HGBA1C 5.80 (H) 03/07/2017     Lab Results   Component Value Date    MICROALBUR <1.2 12/17/2017    CREATININE 0.75 05/30/2018     Imaging Results (most recent)     None                Assessment and Plan:    Silvia was seen today for diabetes.    Diagnoses and all orders for this visit:    Uncontrolled type 2 diabetes mellitus with complication, with long-term current use of insulin  -     Hemoglobin A1c  -     Basic Metabolic Panel; Future  -     Lipid Panel; Future    Gastroparesis  -     Hemoglobin A1c  -     Basic Metabolic Panel; Future  -     Lipid Panel; Future    DUKES (nonalcoholic steatohepatitis)  -     Hemoglobin A1c  -     Basic Metabolic Panel; Future  -     Lipid Panel; Future    Hyperlipidemia, unspecified hyperlipidemia type  -     Hemoglobin A1c  -     Basic Metabolic Panel; Future  -     Lipid Panel; Future    Other hyperlipidemia  -     Hemoglobin A1c  -     Basic Metabolic Panel; Future  -     Lipid Panel; Future        Type 2 diabetes mellitus-severely uncontrolled, complicated with steroids  Will increase U-500 insulin to 65  "units with each meal  Will increase U-500 insulin to 35-40 units with each snack  Will cover with high dose sliding scale 3 times a day before meals, with snacks and also at bedtime  Patient will do 5 units for 50 about 1 50 mg/dL range.  Advised the patient to check her blood sugars at least 3 times or day minimum.  Counseled her that if she does not check her blood sugars she is not using the sliding scale and as a result she could be having high sugars and we do not want her elevated blood sugars to be the reason for another hospitalization.    Counseled her that her steroids are also complicating her blood glucose control and it is extremely important that she checks her blood sugars and adjust her insulin accordingly.    Patient is also approved for dexcom, advised patient to contact us immediately after starting the dexcom on her.  With the dexcom patient will be able to adjust her insulin dosages were high and low blood sugars.    Gastroparesis and liver cirrhosis  Advised the patient to contact gastroenterology in the next 1-2 days if she does not start feeling better    ITP  Patient is currently on prednisone 60 mg oral daily  Counseled her that she needs to contact me immediately when the steroids are tapered as we need to adjust her insulin dosages at that time.    Reviewed Lab results with the patient.             Merary Burnett MD  06/08/18    EMR Dragon / transcription disclaimer:     \"Dictated utilizing Dragon dictation\".  "

## 2018-06-09 NOTE — PROGRESS NOTES
Mail results to pt, no treatment changes at this time. Hba1c is bit better than last time. Visit Information Date & Time Provider Department Dept. Phone Encounter #  
 5/8/2017 11:45 AM Doris Sandoval MD [address] [place] [address] [phone] [id number] Follow-up Instructions Return in about 3 months (around 8/8/2017) for hyperlipidemia follow up. Your Appointments 8/16/2017 11:00 AM  
COMPLETE PHYSICAL with Doris Sandoval MD  
[facility]) Appt Note: CPE; CPE; [address] [id number] [phone]  
  
   
 [address] [id number] Upcoming Health Maintenance Date Due COLONOSCOPY 6/10/2017 BREAST CANCER SCRN MAMMOGRAM 8/17/2017 INFLUENZA AGE 9 TO ADULT 8/1/2017 PAP AKA CERVICAL CYTOLOGY 10/25/2017 DTaP/Tdap/Td series (2 - Td) 3/25/2025 Allergies as of 5/8/2017  Review Complete On: 5/8/2017 By: Doris Sandoval MD  
 No Known Allergies Current Immunizations  Reviewed on 5/8/2017 Name Date Influenza Vaccine 10/8/2014 Tdap 3/25/2015 Reviewed by Sejal Cortés LPN on 0/8/3131 at 01:56 PM  
You Were Diagnosed With   
  
 Codes Comments Mixed hyperlipidemia    -  Primary ICD-10-CM: H53.7 ICD-9-CM: 272.2 Obesity, Class I, BMI 30-34.9     ICD-10-CM: E66.9 ICD-9-CM: 278.00 Depressive disorder, not elsewhere classified     ICD-10-CM: F32.9 ICD-9-CM: 323 Anxiety     ICD-10-CM: F41.9 ICD-9-CM: 300.00 Allergic rhinitis, unspecified allergic rhinitis trigger, unspecified rhinitis seasonality     ICD-10-CM: J30.9 ICD-9-CM: 477.9 Screening mammogram, encounter for     ICD-10-CM: Z12.31 
ICD-9-CM: V76.12 Encounter for gynecological examination without abnormal finding     ICD-10-CM: A19.354 ICD-9-CM: V72.31 Vitals BP Pulse Temp Resp Height(growth percentile) Weight(growth percentile)  127/63 (BP 1 Location: Left arm, BP Patient Position: Sitting) 76 96.9 °F (36.1 °C) (Oral) 15 5' 2\" (1.575 m) 184 lb 12.8 oz (83.8 kg) SpO2 BMI OB Status Smoking Status 97% 33.8 kg/m2 Hysterectomy Former Smoker Vitals History BMI and BSA Data Body Mass Index Body Surface Area  
 33.8 kg/m 2 1.91 m 2 Preferred Pharmacy Pharmacy Name Phone [facility] [phone] Your Updated Medication List  
  
   
This list is accurate as of: 5/8/17 12:47 PM.  Always use your most recent med list.  
  
  
  
  
 albuterol 90 mcg/actuation inhaler Commonly known as:  PROVENTIL HFA, VENTOLIN HFA, PROAIR HFA Take 2 puffs by inhalation every four (4) hours as needed for Wheezing. ALLEGRA-D 12 HOUR PO Take 1 Tab by mouth daily as needed (Allergies). Indications: SEASONAL ALLERGIES  
  
 atorvastatin 20 mg tablet Commonly known as:  LIPITOR Take 1 Tab by mouth daily. busPIRone 10 mg tablet Commonly known as:  BUSPAR Take 1 Tab by mouth two (2) times a day. Indications: GENERALIZED ANXIETY DISORDER  
  
 CALCIUM + D PO Take 1 Tab by mouth daily. Indications: SUPPLEMENT  
  
 escitalopram oxalate 20 mg tablet Commonly known as:  Vernestine Fiddler Take 1 Tab by mouth daily. Indications: MAJOR DEPRESSIVE DISORDER  
  
 magnesium oxide 400 mg tablet Commonly known as:  MAG-OX Take 400 mg by mouth daily. meloxicam 15 mg tablet Commonly known as:  MOBIC Take 1 Tab by mouth daily. Indications: OSTEOARTHRITIS MULTIPLE VITAMIN PO Take 1 Tab by mouth daily. Indications: VITAMIN DEFICIENCY PREVENTION  
  
 OLANZapine 2.5 mg tablet Commonly known as:  ZyPREXA  
TAKE 1 TABLET EVERY DAY  
  
 omega-3 fatty acids-vitamin e 1,000 mg Cap Take 2 Caps by mouth. We Performed the Following LIPID PANEL [13859 CPT(R)] METABOLIC PANEL, COMPREHENSIVE [85741 CPT(R)] REFERRAL TO OBSTETRICS AND GYNECOLOGY [REF51 Custom] Comments:  
 Please evaluate patient for gyn visit. Follow-up Instructions Return in about 3 months (around 8/8/2017) for hyperlipidemia follow up. To-Do List   
 05/08/2017 Imaging:  CT HEART W/O CONT WITH CALCIUM   
  
 05/08/2017 Imaging:  JESSICA MAMMO BI SCREENING INCL CAD Referral Information Referral ID Referred By Referred To  
  
 [id number] Damian Hawkins Not Available Visits Status Start Date End Date 1 New Request 5/8/17 5/8/18 If your referral has a status of pending review or denied, additional information will be sent to support the outcome of this decision. Referral ID Referred By Referred To  
 [id number] [facility] [address] [facility] [address] [place], [address] Visits Status Start Date End Date 1 New Request 5/8/17 5/8/18 If your referral has a status of pending review or denied, additional information will be sent to support the outcome of this decision. Introducing [facility]! Dear Hawa Lisa: 
Thank you for requesting a [employer] account. Our records indicate that you already have an active [employer] account. You can access your account anytime at [email or web address] Did you know that you can access your hospital and ER discharge instructions at any time in [employer]? You can also review all of your test results from your hospital stay or ER visit. Additional Information If you have questions, please visit the Frequently Asked Questions section of the [employer] website at [email or web address] Remember, [employer] is NOT to be used for urgent needs. For medical emergencies, dial 911. Now available from your iPhone and Android! Please provide this summary of care documentation to your next provider. Your primary care clinician is listed as Tim Watkins. If you have any questions after today's visit, please call [phone].

## 2018-06-11 ENCOUNTER — OFFICE VISIT (OUTPATIENT)
Dept: GASTROENTEROLOGY | Facility: CLINIC | Age: 58
End: 2018-06-11

## 2018-06-11 VITALS
WEIGHT: 188 LBS | BODY MASS INDEX: 29.51 KG/M2 | DIASTOLIC BLOOD PRESSURE: 80 MMHG | SYSTOLIC BLOOD PRESSURE: 124 MMHG | TEMPERATURE: 98.8 F | HEIGHT: 67 IN

## 2018-06-11 DIAGNOSIS — K74.69 OTHER CIRRHOSIS OF LIVER (HCC): Primary | ICD-10-CM

## 2018-06-11 DIAGNOSIS — D69.3 ACUTE ITP (HCC): ICD-10-CM

## 2018-06-11 DIAGNOSIS — K75.81 NASH (NONALCOHOLIC STEATOHEPATITIS): ICD-10-CM

## 2018-06-11 DIAGNOSIS — IMO0002 TYPE 2 DIABETES MELLITUS, UNCONTROLLED, WITH NEUROPATHY: ICD-10-CM

## 2018-06-11 PROCEDURE — 99213 OFFICE O/P EST LOW 20 MIN: CPT | Performed by: INTERNAL MEDICINE

## 2018-06-11 RX ORDER — FOLIC ACID 1 MG/1
TABLET ORAL
Qty: 30 TABLET | Refills: 1 | Status: SHIPPED | OUTPATIENT
Start: 2018-06-11 | End: 2018-08-14 | Stop reason: SDUPTHER

## 2018-06-11 RX ORDER — FUROSEMIDE 40 MG/1
40 TABLET ORAL DAILY
Qty: 30 TABLET | Refills: 2 | Status: SHIPPED | OUTPATIENT
Start: 2018-06-11 | End: 2018-06-19 | Stop reason: SDUPTHER

## 2018-06-11 NOTE — PROGRESS NOTES
"Tali Zabala is a 57 y.o.. female is here today for follow-up.    Chief Complaint   Patient presents with   • Follow-up   • Cirrhosis     History of Present Illness  Patient was recently hospitalized with severe thrombocytopenia, was given the diagnosis of ITP, treated with IVIG and steroids and his had a good response with her platelet count now in excess of 100,000.  She is having a great deal of abdominal bloating and swelling, can't really tell whether it represents ascites or not.  The following portions of the patient's history were reviewed and updated as appropriate: allergies, current medications, past family history, past medical history, past social history, past surgical history and problem list.    Review of Systems   Respiratory: Negative for shortness of breath.    Cardiovascular: Negative for chest pain.   All other systems reviewed and are negative.      Objective   Physical Exam   Constitutional: She appears well-developed and well-nourished.   Nursing note and vitals reviewed.    Patient has a distended abdomen.  It is difficult to tell whether she has a fluid wave or not he rated she does have a prominent gastric bubble present.    Assessment/Plan   Problems Addressed this Visit        Digestive    Cirrhosis of liver - Primary    Relevant Orders    CT abdomen pelvis wo contrast    DUKES (nonalcoholic steatohepatitis)       Endocrine    Type 2 diabetes mellitus, uncontrolled, with neuropathy       Immune and Lymphatic    Acute ITP        One of the things that was interesting with her last hospitalization was a chest CT scan demonstrated a \"evidence of pancreatitis\" although she had absolutely nothing to suggest that diagnosis.  I would like to get a CT scan of her abdomen and pelvis ensued we can clarify this.  I would like for her to start on Lasix 40 mg daily and return in a few weeks.  "

## 2018-06-14 ENCOUNTER — HOSPITAL ENCOUNTER (OUTPATIENT)
Dept: CT IMAGING | Facility: HOSPITAL | Age: 58
Discharge: HOME | End: 2018-06-14
Attending: INTERNAL MEDICINE | Admitting: INTERNAL MEDICINE

## 2018-06-14 DIAGNOSIS — K74.69 OTHER CIRRHOSIS OF LIVER (HCC): ICD-10-CM

## 2018-06-14 PROCEDURE — 74176 CT ABD & PELVIS W/O CONTRAST: CPT

## 2018-06-14 RX ORDER — FERROUS SULFATE 325(65) MG
TABLET ORAL
Qty: 60 TABLET | Refills: 0 | Status: SHIPPED | OUTPATIENT
Start: 2018-06-14 | End: 2018-07-15 | Stop reason: SDUPTHER

## 2018-06-17 ENCOUNTER — HOSPITAL ENCOUNTER (EMERGENCY)
Facility: HOSPITAL | Age: 58
Discharge: HOME | End: 2018-06-17
Attending: EMERGENCY MEDICINE | Admitting: EMERGENCY MEDICINE

## 2018-06-17 VITALS
OXYGEN SATURATION: 97 % | TEMPERATURE: 98.5 F | BODY MASS INDEX: 29.66 KG/M2 | RESPIRATION RATE: 16 BRPM | DIASTOLIC BLOOD PRESSURE: 80 MMHG | WEIGHT: 189 LBS | HEART RATE: 109 BPM | SYSTOLIC BLOOD PRESSURE: 142 MMHG | HEIGHT: 67 IN

## 2018-06-17 DIAGNOSIS — R04.0 EPISTAXIS: Primary | ICD-10-CM

## 2018-06-17 DIAGNOSIS — D69.6 THROMBOCYTOPENIA (HCC): ICD-10-CM

## 2018-06-17 LAB
ALBUMIN SERPL-MCNC: 3.2 G/DL (ref 3.5–5.2)
ALBUMIN/GLOB SERPL: 1 G/DL
ALP SERPL-CCNC: 172 U/L (ref 39–117)
ALT SERPL W P-5'-P-CCNC: 65 U/L (ref 1–33)
ANION GAP SERPL CALCULATED.3IONS-SCNC: 11.6 MMOL/L
APTT PPP: 25.4 SECONDS (ref 22.7–35.4)
AST SERPL-CCNC: 64 U/L (ref 1–32)
BASOPHILS # BLD AUTO: 0 10*3/MM3 (ref 0–0.2)
BASOPHILS NFR BLD AUTO: 0 % (ref 0–1.5)
BILIRUB SERPL-MCNC: 0.5 MG/DL (ref 0.1–1.2)
BUN BLD-MCNC: 7 MG/DL (ref 6–20)
BUN/CREAT SERPL: 8.1 (ref 7–25)
CALCIUM SPEC-SCNC: 9.3 MG/DL (ref 8.6–10.5)
CHLORIDE SERPL-SCNC: 103 MMOL/L (ref 98–107)
CO2 SERPL-SCNC: 27.4 MMOL/L (ref 22–29)
CREAT BLD-MCNC: 0.86 MG/DL (ref 0.57–1)
DEPRECATED RDW RBC AUTO: 62.2 FL (ref 37–54)
EOSINOPHIL # BLD AUTO: 0.04 10*3/MM3 (ref 0–0.7)
EOSINOPHIL NFR BLD AUTO: 0.8 % (ref 0.3–6.2)
ERYTHROCYTE [DISTWIDTH] IN BLOOD BY AUTOMATED COUNT: 18 % (ref 11.7–13)
GFR SERPL CREATININE-BSD FRML MDRD: 69 ML/MIN/1.73
GLOBULIN UR ELPH-MCNC: 3.2 GM/DL
GLUCOSE BLD-MCNC: 73 MG/DL (ref 65–99)
HCT VFR BLD AUTO: 36.1 % (ref 35.6–45.5)
HGB BLD-MCNC: 11.3 G/DL (ref 11.9–15.5)
IMM GRANULOCYTES # BLD: 0.07 10*3/MM3 (ref 0–0.03)
IMM GRANULOCYTES NFR BLD: 1.5 % (ref 0–0.5)
INR PPP: 1.16 (ref 0.9–1.1)
LYMPHOCYTES # BLD AUTO: 1.04 10*3/MM3 (ref 0.9–4.8)
LYMPHOCYTES NFR BLD AUTO: 21.6 % (ref 19.6–45.3)
MCH RBC QN AUTO: 29.7 PG (ref 26.9–32)
MCHC RBC AUTO-ENTMCNC: 31.3 G/DL (ref 32.4–36.3)
MCV RBC AUTO: 95 FL (ref 80.5–98.2)
MONOCYTES # BLD AUTO: 0.41 10*3/MM3 (ref 0.2–1.2)
MONOCYTES NFR BLD AUTO: 8.5 % (ref 5–12)
NEUTROPHILS # BLD AUTO: 3.26 10*3/MM3 (ref 1.9–8.1)
NEUTROPHILS NFR BLD AUTO: 67.6 % (ref 42.7–76)
PLATELET # BLD AUTO: 77 10*3/MM3 (ref 140–500)
PMV BLD AUTO: 10.9 FL (ref 6–12)
POTASSIUM BLD-SCNC: 3.7 MMOL/L (ref 3.5–5.2)
PROT SERPL-MCNC: 6.4 G/DL (ref 6–8.5)
PROTHROMBIN TIME: 14.6 SECONDS (ref 11.7–14.2)
RBC # BLD AUTO: 3.8 10*6/MM3 (ref 3.9–5.2)
SODIUM BLD-SCNC: 142 MMOL/L (ref 136–145)
WBC NRBC COR # BLD: 4.82 10*3/MM3 (ref 4.5–10.7)

## 2018-06-17 PROCEDURE — 96374 THER/PROPH/DIAG INJ IV PUSH: CPT

## 2018-06-17 PROCEDURE — 80053 COMPREHEN METABOLIC PANEL: CPT | Performed by: PHYSICIAN ASSISTANT

## 2018-06-17 PROCEDURE — 85730 THROMBOPLASTIN TIME PARTIAL: CPT | Performed by: PHYSICIAN ASSISTANT

## 2018-06-17 PROCEDURE — 85610 PROTHROMBIN TIME: CPT | Performed by: PHYSICIAN ASSISTANT

## 2018-06-17 PROCEDURE — 85025 COMPLETE CBC W/AUTO DIFF WBC: CPT | Performed by: PHYSICIAN ASSISTANT

## 2018-06-17 PROCEDURE — 99283 EMERGENCY DEPT VISIT LOW MDM: CPT

## 2018-06-17 PROCEDURE — 25010000002 PROMETHAZINE PER 50 MG: Performed by: PHYSICIAN ASSISTANT

## 2018-06-17 RX ORDER — PROMETHAZINE HYDROCHLORIDE 25 MG/ML
12.5 INJECTION, SOLUTION INTRAMUSCULAR; INTRAVENOUS ONCE
Status: COMPLETED | OUTPATIENT
Start: 2018-06-17 | End: 2018-06-17

## 2018-06-17 RX ADMIN — PROMETHAZINE HYDROCHLORIDE 12.5 MG: 25 INJECTION INTRAMUSCULAR; INTRAVENOUS at 04:57

## 2018-06-17 NOTE — ED TRIAGE NOTES
Pt ambulatory to triage with c/o nose bleed prior to arrival.  States has had these before and when they occur, pt platelets are low (pt has ITP).  States still feels some blood dripping down back of throat.  Also c/o urinary hesitancy, has been taking AZO tablets, which have helped, but still has trouble getting urine stream to start.  Denies dysuria.  Pt appears stable.  First look completed.

## 2018-06-17 NOTE — ED PROVIDER NOTES
Pt with a hx of ITP presents to the ED c/o epistaxis that began this morning. Pt states taht she type experiences epistaxis when her platelets are low. On exam, Pt is resting comfortably, in no distress, and without focal neurologic deficit. There is no active bleeding from the nares. There is no blood in the posterior oropharynx. Cardiovascular exam is within normal limits and without murmur. Platelets are 77. I agree with the plan for discharged.      Attestation:  The AYESHA and I have discussed this patient's history, physical exam, and treatment plan.  I have reviewed the documentation and personally had a face to face interaction with the patient. I affirm the documentation and agree with the treatment and plan.  The attached note describes my personal findings.      Documentation assistance provided by tori Mccullough for Dr Mccormick Information recorded by the tori was done at my direction and has been verified and validated by me.     Brandi Mccullough  06/17/18 0549       Mike Mccormick MD  06/17/18 8247

## 2018-06-17 NOTE — ED PROVIDER NOTES
EMERGENCY DEPARTMENT ENCOUNTER    CHIEF COMPLAINT  Chief Complaint: Epistaxis  History given by: Patient  History limited by: none  Room Number: 13/13  PMD: Blanca Pitts MD      HPI:  Pt is a 57 y.o. female who presents complaining of epistaxis that began at 0230 this morning. Pt states bleeding has since resolved. Pt states the last time she had an episode such as this she had low platelet count and pt has hx of ITP. Pt confirms difficulty urinating, but denies dysuria and hematuria. Pt denies any hx of injury or any other bleeding. Pt states she has appointment with hematologist tomorrow morning, who have been monitoring platelets. Pt states she had outpatient CT scan performed several days ago that she had not yet received the results from.     Duration:  2 hours  Onset: gradual  Timing: costant  Location: nose  Radiation: none  Quality: epistaxis  Intensity/Severity: mild  Progression: improved  Associated Symptoms: difficulty urinating  Aggravating Factors: unknown  Alleviating Factors: none  Previous Episodes: Pt states that at last episode of epistaxis she had low platelet count.   Treatment before arrival: none    PAST MEDICAL HISTORY  Active Ambulatory Problems     Diagnosis Date Noted   • Cirrhosis of liver 03/08/2016   • Rheumatoid arthritis 03/08/2016   • Anxiety and depression 03/08/2016   • Type 2 diabetes mellitus, uncontrolled, with neuropathy 03/15/2016   • Fibrositis 03/15/2016   • Change in blood platelet count 03/15/2016   • Pancytopenia 04/12/2016   • Hypersplenism 04/12/2016   • Nausea 06/14/2016   • DUKES (nonalcoholic steatohepatitis) 06/14/2016   • Hyperlipidemia    • Generalized abdominal pain 02/14/2017   • Hematemesis 02/15/2017   • Ascites 02/21/2017   • Portal hypertension 02/22/2017   • Systemic lupus 02/22/2017   • Secondary esophageal varices without bleeding 02/22/2017   • Gastroparesis 10/17/2017   • Cirrhosis of liver without ascites 11/17/2017   • Diabetic ketoacidosis without  coma associated with type 2 diabetes mellitus 12/16/2017   • Diabetic peripheral neuropathy 12/17/2017   • History of seizure disorder 12/17/2017   • Hepatic encephalopathy 05/08/2018   • Abnormal finding of blood chemistry  05/08/2018   • Fever 05/17/2018   • Acute ITP 05/18/2018   • Degeneration of lumbosacral intervertebral disc 05/30/2018   • Seizure 05/30/2018   • Spondylosis without myelopathy 05/30/2018     Resolved Ambulatory Problems     Diagnosis Date Noted   • Secondary esophageal varices with bleeding 03/07/2017   • Upper GI bleed 04/25/2017   • Thrombocytopenia 05/16/2018     Past Medical History:   Diagnosis Date   • Anemia    • Anxiety    • Arthritis    • Chromosomal abnormality    • Cirrhosis    • Colon polyps    • Deafness    • Depression    • Diabetes mellitus    • Duodenal ulcer with hemorrhage    • Esophageal varices determined by endoscopy    • Fibromyalgia    • Gallbladder disease    • Gastric varices    • Gastroparesis    • Glaucoma    • Granuloma annulare    • H/O B12 deficiency    • H/O Bleeding ulcer    • H/O mixed connective tissue disorder    • Hemorrhoids    • Hiatal hernia    • History of transfusion    • Hx of being hospitalized    • Hyperlipidemia    • Migraine    • DUKES (nonalcoholic steatohepatitis) 11/2016   • BRICE (obstructive sleep apnea)    • Pancreas divisum    • Pancytopenia    • Peptic ulcer disease    • PONV (postoperative nausea and vomiting)    • RA (rheumatoid arthritis)    • Seizure disorder    • Seizures    • Systemic lupus        PAST SURGICAL HISTORY  Past Surgical History:   Procedure Laterality Date   • BLADDER REPAIR  1991   • CATARACT EXTRACTION     • CHOLECYSTECTOMY  1994   • ENDOSCOPY N/A 11/7/2016    Procedure: ESOPHAGOGASTRODUODENOSCOPY WITH COLD POLYPECTOMY, BANDING,  AND CLIPS TIMES 2;  Surgeon: Rich Coleman MD;  Location: [facility] ENDOSCOPY;  Service:    • ENDOSCOPY N/A 12/22/2016    Procedure: ESOPHAGOGASTRODUODENOSCOPY WITH ESOPHAGEAL BANDING. 5 BANDS  "FIRED, 4 BANDS ADHERERD TO MUCOSA;  Surgeon: Rich Coleman MD;  Location: [facility] ENDOSCOPY;  Service:    • ENDOSCOPY N/A 2/15/2017    Procedure: ESOPHAGOGASTRODUODENOSCOPY AT BEDSIDE with esophageal varices banding (3);  Surgeon: Rich Coleman MD;  Location: [facility] ENDOSCOPY;  Service:    • ENDOSCOPY N/A 4/6/2017    Procedure: ESOPHAGOGASTRODUODENOSCOPY WITH ESOPHAGEAL VARICES BANDING x2;  Surgeon: Rich Coleman MD;  Location: [facility] ENDOSCOPY;  Service:    • ENDOSCOPY N/A 11/24/2017    Procedure: ESOPHAGOGASTRODUODENOSCOPY with biopsies;  Surgeon: Rich Coleman MD;  Location: [facility] ENDOSCOPY;  Service:    • ENDOSCOPY AND COLONOSCOPY  2014    Dr. Rich Coleman with findings of candida esophagitis   • EYE SURGERY     • HYSTERECTOMY  1986    partial   • JOINT REPLACEMENT     • KNEE SURGERY Right 1995    \"right knee recontstruction\"   • LIVER BIOPSY  01/2015   • MASS EXCISION     • STOMACH SURGERY         FAMILY HISTORY  Family History   Problem Relation Age of Onset   • Liver disease Other    • Depression Other    • Heart disease Other    • Hypertension Other    • Diabetes Other    • Breast cancer Other    • Brain cancer Other    • Uterine cancer Other    • Colon cancer Other    • Leukemia Other    • Colon cancer Maternal Aunt    • Hypertension Mother    • Diabetes type II Mother    • Rheum arthritis Mother    • Liver disease Mother         DUKES   • Heart disease Father    • Diabetes type II Father    • Diabetes type II Sister    • Lupus Sister    • Malig Hyperthermia Neg Hx        SOCIAL HISTORY  Social History     Social History   • Marital status:      Spouse name: Jose   • Number of children: N/A   • Years of education: N/A     Occupational History   •  Disabled     Social History Main Topics   • Smoking status: Former Smoker     Packs/day: 0.00     Years: 0.00     Quit date: 12/17/2015   • Smokeless tobacco: Never Used   • Alcohol use No   • Drug use: No   • Sexual activity: Defer "     Other Topics Concern   • Not on file     Social History Narrative    Moved to [place] from [place]. She is currently medically disabled.       ALLERGIES  Albuterol; Lactulose; Tramadol; and Quinine derivatives    REVIEW OF SYSTEMS  Review of Systems   Constitutional: Negative for fever.   HENT: Positive for nosebleeds. Negative for sore throat.    Eyes: Negative.    Respiratory: Negative for cough and shortness of breath.    Cardiovascular: Negative for chest pain.   Gastrointestinal: Negative for abdominal pain, blood in stool, diarrhea and vomiting.   Genitourinary: Positive for difficulty urinating. Negative for dysuria and hematuria.   Musculoskeletal: Negative for neck pain.   Skin: Negative for rash.   Allergic/Immunologic: Negative.    Neurological: Negative for weakness, numbness and headaches.   Hematological: Negative.    Psychiatric/Behavioral: Negative.    All other systems reviewed and are negative.      PHYSICAL EXAM  ED Triage Vitals   Temp Heart Rate Resp BP SpO2   06/17/18 0358 06/17/18 0358 06/17/18 0358 06/17/18 0408 06/17/18 0358   97.9 °F (36.6 °C) 109 16 142/80 98 %      Temp src Heart Rate Source Patient Position BP Location FiO2 (%)   06/17/18 0358 06/17/18 0358 06/17/18 0408 06/17/18 0408 --   Tympanic Monitor Sitting Right arm        Physical Exam   Constitutional: She is oriented to person, place, and time. No distress.   HENT:   Head: Normocephalic and atraumatic.   Nose: Epistaxis (dried blood on L sided septum, R side appears clear, no blood in posterior pharynx) is observed.   Eyes: EOM are normal. Pupils are equal, round, and reactive to light.   Neck: Normal range of motion. Neck supple.   Cardiovascular: Normal rate, regular rhythm and normal heart sounds.    Pulmonary/Chest: Effort normal and breath sounds normal. No respiratory distress.   Abdominal: Soft. There is no tenderness. There is no rebound and no guarding.   Musculoskeletal: Normal range of motion. She exhibits no  edema.   Neurological: She is alert and oriented to person, place, and time. She has normal sensation and normal strength.   Skin: Skin is warm and dry. No rash noted.   Psychiatric: Mood and affect normal.   Nursing note and vitals reviewed.      LAB RESULTS  Lab Results (last 24 hours)     Procedure Component Value Units Date/Time    CBC & Differential [[id number]] Collected:  06/17/18 0446    Specimen:  Blood Updated:  06/17/18 0501    Narrative:       The following orders were created for panel order CBC & Differential.  Procedure                               Abnormality         Status                     ---------                               -----------         ------                     CBC Auto Differential[[id number]]        Abnormal            Final result                 Please view results for these tests on the individual orders.    Comprehensive Metabolic Panel [[id number]]  (Abnormal) Collected:  06/17/18 0446    Specimen:  Blood Updated:  06/17/18 0520     Glucose 73 mg/dL      BUN 7 mg/dL      Creatinine 0.86 mg/dL      Sodium 142 mmol/L      Potassium 3.7 mmol/L      Chloride 103 mmol/L      CO2 27.4 mmol/L      Calcium 9.3 mg/dL      Total Protein 6.4 g/dL      Albumin 3.20 (L) g/dL      ALT (SGPT) 65 (H) U/L      AST (SGOT) 64 (H) U/L      Alkaline Phosphatase 172 (H) U/L      Total Bilirubin 0.5 mg/dL      eGFR Non African Amer 69 mL/min/1.73      Globulin 3.2 gm/dL      A/G Ratio 1.0 g/dL      BUN/Creatinine Ratio 8.1     Anion Gap 11.6 mmol/L     Protime-INR [[id number]]  (Abnormal) Collected:  06/17/18 0446    Specimen:  Blood Updated:  06/17/18 0510     Protime 14.6 (H) Seconds      INR 1.16 (H)    aPTT [[id number]]  (Normal) Collected:  06/17/18 0446    Specimen:  Blood Updated:  06/17/18 0510     PTT 25.4 seconds     CBC Auto Differential [[id number]]  (Abnormal) Collected:  06/17/18 0446    Specimen:  Blood Updated:  06/17/18 0501     WBC 4.82 10*3/mm3      RBC 3.80 (L) 10*6/mm3       Hemoglobin 11.3 (L) g/dL      Hematocrit 36.1 %      MCV 95.0 fL      MCH 29.7 pg      MCHC 31.3 (L) g/dL      RDW 18.0 (H) %      RDW-SD 62.2 (H) fl      MPV 10.9 fL      Platelets 77 (L) 10*3/mm3      Neutrophil % 67.6 %      Lymphocyte % 21.6 %      Monocyte % 8.5 %      Eosinophil % 0.8 %      Basophil % 0.0 %      Immature Grans % 1.5 (H) %      Neutrophils, Absolute 3.26 10*3/mm3      Lymphocytes, Absolute 1.04 10*3/mm3      Monocytes, Absolute 0.41 10*3/mm3      Eosinophils, Absolute 0.04 10*3/mm3      Basophils, Absolute 0.00 10*3/mm3      Immature Grans, Absolute 0.07 (H) 10*3/mm3           I ordered the above labs and reviewed the results    Procedures      PROGRESS AND CONSULTS     0431: Labs ordered for further evaluation. Phenergan ordered for nausea management.     0523: Pt rechecked and resting comfortably, no evidence of epistaxis since arrival at ED. Discussed results of labs, pt's LFTS and platelet levels. Discussed with pt the results of outpatient CT scan performed last week, and evidence of ascites. Pt states her blood glucose felt low, and discussed plan to give pt food and drink prior to discharge and outpatient follow up as scheduled. Pt understands and agrees with plan, all questions addressed.       MEDICAL DECISION MAKING  Results were reviewed/discussed with the patient and they were also made aware of online access. Pt also made aware that some labs, such as cultures, will not be resulted during ER visit and follow up with PMD is necessary.     MDM  Number of Diagnoses or Management Options     Amount and/or Complexity of Data Reviewed  Clinical lab tests: ordered and reviewed (Platelets - 77  LFTs are better than previous tests)  Tests in the radiology section of CPT®: reviewed (Pt had outpatient CT performed, results showed ascites)  Decide to obtain previous medical records or to obtain history from someone other than the patient: yes           DIAGNOSIS  Final diagnoses:   Epistaxis    Thrombocytopenia       DISPOSITION  DISCHARGE    Patient discharged in stable condition.    Reviewed implications of results, diagnosis, meds, responsibility to follow up, warning signs and symptoms of possible worsening, potential complications and reasons to return to ER.    Patient/Family voiced understanding of above instructions.    Discussed plan for discharge, as there is no emergent indication for admission. Patient referred to primary care provider for BP management due to today's BP. Pt/family is agreeable and understands need for follow up and repeat testing.  Pt is aware that discharge does not mean that nothing is wrong but it indicates no emergency is present that requires admission and they must continue care with follow-up as given below or physician of their choice.     FOLLOW-UP  Blanca Pitts MD  [address] [facility] [address]  [place] [id number] [phone]    Schedule an appointment as soon as possible for a visit in 3 days      Artemio Steven MD  [address]  [facility] [id number] [phone]    Schedule an appointment as soon as possible for a visit in 3 days           Medication List      No changes were made to your prescriptions during this visit.           Latest Documented Vital Signs:  As of 5:46 AM  BP- 142/80 HR- 109 Temp- 98.5 °F (36.9 °C) (Oral) O2 sat- 97%    --  Documentation assistance provided by tori Turcios for Kervin Medrano PA-C.  Information recorded by the tori was done at my direction and has been verified and validated by me.            Manasa Turcios  06/17/18 0535       ARIAS Card  06/17/18 0546

## 2018-06-17 NOTE — DISCHARGE INSTRUCTIONS
Home, rest, keep nose moisturized, use saline drops, home medicine as prescribed, follow up with PCP for recheck.  Return to care with further concerns.

## 2018-06-18 ENCOUNTER — OFFICE VISIT (OUTPATIENT)
Dept: ONCOLOGY | Facility: CLINIC | Age: 58
End: 2018-06-18

## 2018-06-18 ENCOUNTER — LAB (OUTPATIENT)
Dept: LAB | Facility: HOSPITAL | Age: 58
End: 2018-06-18

## 2018-06-18 ENCOUNTER — TELEPHONE (OUTPATIENT)
Dept: INTERNAL MEDICINE | Facility: CLINIC | Age: 58
End: 2018-06-18

## 2018-06-18 VITALS
HEIGHT: 66 IN | DIASTOLIC BLOOD PRESSURE: 62 MMHG | HEART RATE: 115 BPM | TEMPERATURE: 98.9 F | OXYGEN SATURATION: 98 % | SYSTOLIC BLOOD PRESSURE: 116 MMHG | RESPIRATION RATE: 12 BRPM | WEIGHT: 182.8 LBS | BODY MASS INDEX: 29.38 KG/M2

## 2018-06-18 DIAGNOSIS — K74.69 OTHER CIRRHOSIS OF LIVER (HCC): Primary | ICD-10-CM

## 2018-06-18 DIAGNOSIS — D69.3 ACUTE ITP (HCC): ICD-10-CM

## 2018-06-18 DIAGNOSIS — R18.8 OTHER ASCITES: ICD-10-CM

## 2018-06-18 DIAGNOSIS — D73.1 HYPERSPLENISM: ICD-10-CM

## 2018-06-18 DIAGNOSIS — R79.89 ABNORMAL CBC: ICD-10-CM

## 2018-06-18 DIAGNOSIS — M32.9 SYSTEMIC LUPUS ERYTHEMATOSUS, UNSPECIFIED SLE TYPE, UNSPECIFIED ORGAN INVOLVEMENT STATUS (HCC): Primary | ICD-10-CM

## 2018-06-18 DIAGNOSIS — M06.9 RHEUMATOID ARTHRITIS, INVOLVING UNSPECIFIED SITE, UNSPECIFIED RHEUMATOID FACTOR PRESENCE: ICD-10-CM

## 2018-06-18 DIAGNOSIS — M32.9 SYSTEMIC LUPUS ERYTHEMATOSUS, UNSPECIFIED SLE TYPE, UNSPECIFIED ORGAN INVOLVEMENT STATUS (HCC): ICD-10-CM

## 2018-06-18 LAB
BASOPHILS # BLD AUTO: 0.01 10*3/MM3 (ref 0–0.1)
BASOPHILS NFR BLD AUTO: 0.2 % (ref 0–1.1)
DEPRECATED RDW RBC AUTO: 59 FL (ref 37–49)
EOSINOPHIL # BLD AUTO: 0.03 10*3/MM3 (ref 0–0.36)
EOSINOPHIL NFR BLD AUTO: 0.6 % (ref 1–5)
ERYTHROCYTE [DISTWIDTH] IN BLOOD BY AUTOMATED COUNT: 17.5 % (ref 11.7–14.5)
HCT VFR BLD AUTO: 34.3 % (ref 34–45)
HGB BLD-MCNC: 11 G/DL (ref 11.5–14.9)
IMM GRANULOCYTES # BLD: 0.06 10*3/MM3 (ref 0–0.03)
IMM GRANULOCYTES NFR BLD: 1.2 % (ref 0–0.5)
LYMPHOCYTES # BLD AUTO: 0.52 10*3/MM3 (ref 1–3.5)
LYMPHOCYTES NFR BLD AUTO: 10.1 % (ref 20–49)
MCH RBC QN AUTO: 29.6 PG (ref 27–33)
MCHC RBC AUTO-ENTMCNC: 32.1 G/DL (ref 32–35)
MCV RBC AUTO: 92.2 FL (ref 83–97)
MONOCYTES # BLD AUTO: 0.5 10*3/MM3 (ref 0.25–0.8)
MONOCYTES NFR BLD AUTO: 9.7 % (ref 4–12)
NEUTROPHILS # BLD AUTO: 4.02 10*3/MM3 (ref 1.5–7)
NEUTROPHILS NFR BLD AUTO: 78.2 % (ref 39–75)
NRBC BLD MANUAL-RTO: 0 /100 WBC (ref 0–0)
PLATELET # BLD AUTO: 68 10*3/MM3 (ref 150–375)
PMV BLD AUTO: 10.4 FL (ref 8.9–12.1)
RBC # BLD AUTO: 3.72 10*6/MM3 (ref 3.9–5)
WBC NRBC COR # BLD: 5.14 10*3/MM3 (ref 4–10)

## 2018-06-18 PROCEDURE — 36415 COLL VENOUS BLD VENIPUNCTURE: CPT | Performed by: INTERNAL MEDICINE

## 2018-06-18 PROCEDURE — 99215 OFFICE O/P EST HI 40 MIN: CPT | Performed by: INTERNAL MEDICINE

## 2018-06-18 PROCEDURE — 85025 COMPLETE CBC W/AUTO DIFF WBC: CPT | Performed by: INTERNAL MEDICINE

## 2018-06-18 RX ORDER — MEPERIDINE HYDROCHLORIDE 50 MG/ML
25 INJECTION INTRAMUSCULAR; INTRAVENOUS; SUBCUTANEOUS
Status: CANCELLED | OUTPATIENT
Start: 2018-07-16 | End: 2018-07-17

## 2018-06-18 RX ORDER — DIPHENHYDRAMINE HYDROCHLORIDE 50 MG/ML
50 INJECTION INTRAMUSCULAR; INTRAVENOUS AS NEEDED
Status: CANCELLED | OUTPATIENT
Start: 2018-07-16

## 2018-06-18 RX ORDER — FAMOTIDINE 10 MG/ML
20 INJECTION, SOLUTION INTRAVENOUS ONCE
Status: CANCELLED | OUTPATIENT
Start: 2018-07-02

## 2018-06-18 RX ORDER — ACETAMINOPHEN 325 MG/1
650 TABLET ORAL ONCE
Status: CANCELLED | OUTPATIENT
Start: 2018-07-02

## 2018-06-18 RX ORDER — SODIUM CHLORIDE 9 MG/ML
250 INJECTION, SOLUTION INTRAVENOUS ONCE
Status: CANCELLED | OUTPATIENT
Start: 2018-07-16

## 2018-06-18 RX ORDER — DIPHENHYDRAMINE HYDROCHLORIDE 50 MG/ML
50 INJECTION INTRAMUSCULAR; INTRAVENOUS AS NEEDED
Status: CANCELLED | OUTPATIENT
Start: 2018-07-09

## 2018-06-18 RX ORDER — MEPERIDINE HYDROCHLORIDE 50 MG/ML
25 INJECTION INTRAMUSCULAR; INTRAVENOUS; SUBCUTANEOUS
Status: CANCELLED | OUTPATIENT
Start: 2018-07-02 | End: 2018-07-03

## 2018-06-18 RX ORDER — FAMOTIDINE 10 MG/ML
20 INJECTION, SOLUTION INTRAVENOUS ONCE
Status: CANCELLED | OUTPATIENT
Start: 2018-07-16

## 2018-06-18 RX ORDER — FAMOTIDINE 10 MG/ML
20 INJECTION, SOLUTION INTRAVENOUS AS NEEDED
Status: CANCELLED | OUTPATIENT
Start: 2018-06-25

## 2018-06-18 RX ORDER — MEPERIDINE HYDROCHLORIDE 50 MG/ML
25 INJECTION INTRAMUSCULAR; INTRAVENOUS; SUBCUTANEOUS
Status: CANCELLED | OUTPATIENT
Start: 2018-07-09 | End: 2018-07-10

## 2018-06-18 RX ORDER — ACETAMINOPHEN 325 MG/1
650 TABLET ORAL ONCE
Status: CANCELLED | OUTPATIENT
Start: 2018-07-09

## 2018-06-18 RX ORDER — SODIUM CHLORIDE 9 MG/ML
250 INJECTION, SOLUTION INTRAVENOUS ONCE
Status: CANCELLED | OUTPATIENT
Start: 2018-06-25

## 2018-06-18 RX ORDER — DIPHENHYDRAMINE HYDROCHLORIDE 50 MG/ML
50 INJECTION INTRAMUSCULAR; INTRAVENOUS AS NEEDED
Status: CANCELLED | OUTPATIENT
Start: 2018-07-02

## 2018-06-18 RX ORDER — ACETAMINOPHEN 325 MG/1
650 TABLET ORAL ONCE
Status: CANCELLED | OUTPATIENT
Start: 2018-06-25

## 2018-06-18 RX ORDER — DIPHENHYDRAMINE HYDROCHLORIDE 50 MG/ML
50 INJECTION INTRAMUSCULAR; INTRAVENOUS AS NEEDED
Status: CANCELLED | OUTPATIENT
Start: 2018-06-25

## 2018-06-18 RX ORDER — FAMOTIDINE 10 MG/ML
20 INJECTION, SOLUTION INTRAVENOUS ONCE
Status: CANCELLED | OUTPATIENT
Start: 2018-07-09

## 2018-06-18 RX ORDER — FAMOTIDINE 10 MG/ML
20 INJECTION, SOLUTION INTRAVENOUS ONCE
Status: CANCELLED | OUTPATIENT
Start: 2018-06-25

## 2018-06-18 RX ORDER — FAMOTIDINE 10 MG/ML
20 INJECTION, SOLUTION INTRAVENOUS AS NEEDED
Status: CANCELLED | OUTPATIENT
Start: 2018-07-02

## 2018-06-18 RX ORDER — MEPERIDINE HYDROCHLORIDE 50 MG/ML
25 INJECTION INTRAMUSCULAR; INTRAVENOUS; SUBCUTANEOUS
Status: CANCELLED | OUTPATIENT
Start: 2018-06-25 | End: 2018-06-26

## 2018-06-18 RX ORDER — FAMOTIDINE 10 MG/ML
20 INJECTION, SOLUTION INTRAVENOUS AS NEEDED
Status: CANCELLED | OUTPATIENT
Start: 2018-07-16

## 2018-06-18 RX ORDER — ACETAMINOPHEN 325 MG/1
650 TABLET ORAL ONCE
Status: CANCELLED | OUTPATIENT
Start: 2018-07-16

## 2018-06-18 RX ORDER — FAMOTIDINE 10 MG/ML
20 INJECTION, SOLUTION INTRAVENOUS AS NEEDED
Status: CANCELLED | OUTPATIENT
Start: 2018-07-09

## 2018-06-18 RX ORDER — SODIUM CHLORIDE 9 MG/ML
250 INJECTION, SOLUTION INTRAVENOUS ONCE
Status: CANCELLED | OUTPATIENT
Start: 2018-07-02

## 2018-06-18 RX ORDER — SODIUM CHLORIDE 9 MG/ML
250 INJECTION, SOLUTION INTRAVENOUS ONCE
Status: CANCELLED | OUTPATIENT
Start: 2018-07-09

## 2018-06-18 NOTE — TELEPHONE ENCOUNTER
----- Message from Teresa Vizcarra sent at 6/18/2018 12:23 PM EDT -----  Contact: patient  Ms. Silvia Zabala stopped by the office to cancel her 30 minute hospital follow-up appointment with Dr. Pitts for this Monday, June 25th.  She is starting IV infusion therapy that day and they told her to plan on the entire day.      Dr. Pitts has an appointment, just 15 minutes, on 07/02/2018 at 8:00.  Would it be okay to work her in at that time or what would she like for me to do.    Patient call back [phone]    Thank you,    Teresa

## 2018-06-18 NOTE — PROGRESS NOTES
REASON FOR FOLLOWUP/CHIEF COMPLAINT:  ITP  1. 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   2. Evidence of B12 deficiency on her initial lab evaluation in April 2013.  Patient was started on parenteral B12 replacement.  The patient was switched from shots to daily oral B12 replacement after the visit of 12/02/2013.    3. Hemoglobin has significantly improved since banding of her esophageal varices and increase in her oral iron supplement to twice daily dosing.  4. Morphologically normal bone marrow from 08/20/2013 with normal flow cytometry.  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  Significance of this is uncertain.    5. Mixed connective tissue disorder, currently on Enbrel therapy.  6. Insulin-requiring diabetes mellitus.  7. Patient continues to follow-up periodically with the transplant team at [facility] but currently she is not on the transplant list.  8. 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  She has had GI bleeding in the past due to portal hypertension.  She also has been B12 deficient and takes oral B12 supplementation daily.     She has undergone evaluation for possible liver transplant at [facility].  She currently is not on the transplant list but continues to follow-up periodically.  She remains on Enbrel for her rheumatologic condition.  Her blood counts are significantly improved today with a hemoglobin of 14.  She recently had been started on Aldactone due to development of ascites.  She also hastaken oral iron supplement chronically.     She continues to follow-up with Dr. Rich Coleman.  He performed banding of her esophageal varices in February 2017.    She also had recently developed acute  "ITP and was hospitalized from 5/16 to 5/23/2018  She received 5 days of IVIG and was discharged on 60 mg of prednisone daily.  She had a repeat lab performed on 5/30/2018 that showed her platelet count was up to 110,000.  She was in the ER yesterday with a nosebleed and had platelets of 77,000 at that time.  Her platelet count in our office today is 68,000.    She also reports that her blood sugars have been outrageously high due to the steroid therapy.  She is no longer taking Enbrel and I think we will need to consider Rituxan treatment to try to control her platelets and allow her to get off steroids more quickly.    Past Medical History, Past Surgical History, Social History, Family History have been reviewed and are without significant changes except as mentioned.    Review of Systems   Review of Systems   Constitutional: Negative for activity change.   HENT: Negative for nosebleeds and trouble swallowing.    Respiratory: Negative for shortness of breath and wheezing.    Cardiovascular: Negative for chest pain and palpitations.   Gastrointestinal: Negative for constipation, diarrhea and nausea.   Genitourinary: Negative for dysuria and hematuria.   Musculoskeletal: Negative for arthralgias and myalgias.   Skin: Negative for rash and wound.   Neurological: Negative for seizures and syncope.   Hematological: Negative for adenopathy. Bruises/bleeds easily.   Psychiatric/Behavioral: Negative for confusion.       Medications:  The current medication list was reviewed in the EMR    ALLERGIES:    Allergies   Allergen Reactions   • Albuterol Anaphylaxis   • Lactulose Nausea And Vomiting and Other (See Comments)     Severe abdominal pain   • Tramadol Other (See Comments)     Per pt causes her \"palsy, meaning shaking and tremors\" and gi upset, nausea   • Quinine Derivatives Nausea And Vomiting              Vitals:    06/18/18 1127   BP: 116/62   Pulse: 115   Resp: 12   Temp: 98.9 °F (37.2 °C)   TempSrc: Oral   SpO2: 98% " "  Weight: 82.9 kg (182 lb 12.8 oz)   Height: 168.5 cm (66.34\")   PainSc: 7  Comment: stomach, abdominal and back pain     Physical Exam    CONSTITUTIONAL:  Vital signs reviewed.  No distress, looks comfortable.  EYES:  Conjunctiva and lids unremarkable.  PERRLA  EARS,NOSE,MOUTH,THROAT:  Ears and nose appear unremarkable.  Lips, teeth, gums appear unremarkable.  RESPIRATORY:  Normal respiratory effort.  Lungs clear to auscultation bilaterally.  CARDIOVASCULAR:  Normal S1, S2.  No murmurs rubs or gallops.  No significant lower extremity edema.  GASTROINTESTINAL: Abdomen appears unremarkable.  Nontender.  No hepatomegaly.  No splenomegaly.  MUSCULOSKELETAL:  Unremarkable digits/nails.  No cyanosis or clubbing.  SKIN:  Warm.  No rashes.some bruising at the sites of subcutaneous injections in the abdomen which is progressing some.  PSYCHIATRIC:  Normal judgment and insight.  Normal mood and affect.       RECENT LABS:  WBC   Date Value Ref Range Status   06/18/2018 5.14 4.00 - 10.00 10*3/mm3 Final   06/17/2018 4.82 4.50 - 10.70 10*3/mm3 Final     Hemoglobin   Date Value Ref Range Status   06/18/2018 11.0 (L) 11.5 - 14.9 g/dL Final   06/17/2018 11.3 (L) 11.9 - 15.5 g/dL Final     Platelets   Date Value Ref Range Status   06/18/2018 68 (L) 150 - 375 10*3/mm3 Final   06/17/2018 77 (L) 140 - 500 10*3/mm3 Final     Lab Results   Component Value Date    NEUTROABS 4.02 06/18/2018                 ASSESSMENT/PLAN:     *ITP.  Appears to be most consistent with ITP.  Peripheral smear shows no significant amount of schistocytes and no platelet clumping.  IPF elevated at 18.4%, consistent with a destructive etiology of thrombocytopenia.  Unremarkable: Folate, B12, fibrinogen, PTT.  Did not respond to platelet transfusion, also consistent with a destructive process.  She received 5 daily doses of IVIG in the hospital and has remained on prednisone 60 mg daily since discharge.  In spite of this her platelet count is drifting down again " and she is having major side effects and complications from her steroids including exacerbation of her diabetes.    *Mild elevation in INR at 1.26.  Suspect this is due to cirrhosis.     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  WBC improved from 2 up to 3, then again up to 4.3 today.  Hb better today, up to 10.6 from 9.9     *B12 deficiency.  On oral B12 since December 2013.     *History of esophageal varices banding.     *Iron deficiency anemia due to GI bleeding.  Has been on oral iron twice per day.  Ferritin 51 with 10% saturation in the hospital.  She received 2 doses of IV Venofer prior to discharge..    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  This is of unknown significance.     *Rheumatoid arthritis.  Reports she hasn't been unable to take Enbrel since December due to persistent fevers.     *Lupus     *Diabetes.  Hyperglycemia from steroids may be a problem.  Defer to hospitalist.     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        Plan  · Decrease prednisone to 40 mg daily    · Completed IVIG 400 mg/kg daily x5  · Venofer 300 mg daily givenx2   · We discussed 4 weekly doses of IV Rituxan in the hopes of controlling her ITP and allowing her to get off high-dose prednisone more quickly.  We will try to obtain approval and will tentatively plan on having her return to the office next week for her first dose of Rituxan.  · We will continue to check her blood counts weekly.  · We also will check her repeat iron studies next week to assess her response to Venofer that she received during her hospitalization.

## 2018-06-19 ENCOUNTER — TELEPHONE (OUTPATIENT)
Dept: SOCIAL WORK | Facility: HOSPITAL | Age: 58
End: 2018-06-19

## 2018-06-19 ENCOUNTER — OFFICE VISIT (OUTPATIENT)
Dept: GASTROENTEROLOGY | Facility: CLINIC | Age: 58
End: 2018-06-19

## 2018-06-19 VITALS
HEIGHT: 67 IN | WEIGHT: 177 LBS | DIASTOLIC BLOOD PRESSURE: 74 MMHG | SYSTOLIC BLOOD PRESSURE: 125 MMHG | BODY MASS INDEX: 27.78 KG/M2 | HEART RATE: 100 BPM

## 2018-06-19 DIAGNOSIS — R18.8 OTHER ASCITES: Primary | ICD-10-CM

## 2018-06-19 PROCEDURE — 99213 OFFICE O/P EST LOW 20 MIN: CPT | Performed by: INTERNAL MEDICINE

## 2018-06-19 RX ORDER — FUROSEMIDE 40 MG/1
80 TABLET ORAL DAILY
Qty: 60 TABLET | Refills: 2 | Status: ON HOLD | OUTPATIENT
Start: 2018-06-19 | End: 2018-10-06 | Stop reason: SDUPTHER

## 2018-06-19 NOTE — PROGRESS NOTES
Subjective   Silvia Zabala is a 57 y.o.. female is here today for follow-up.    Chief Complaint   Patient presents with   • Cirrhosis   • Ascites   • Bloated   • Abdominal Pain   • Nausea       History of Present Illness  The above symptoms are stable.  She had a CT scan recently performed.  It demonstrated ascites.  The question is whether she would benefit from paracentesis or not.  The following portions of the patient's history were reviewed and updated as appropriate: allergies, current medications, past family history, past medical history, past social history, past surgical history and problem list.    Review of Systems   Respiratory: Negative for shortness of breath.    Cardiovascular: Negative for chest pain.   All other systems reviewed and are negative.      Objective   Physical Exam   Constitutional: She appears well-developed and well-nourished.   Nursing note and vitals reviewed.        Assessment/Plan   Problems Addressed this Visit        Other    Ascites - Primary    Relevant Orders    Basic Metabolic Panel        I reviewed the CT scan with the patient.  There is some fluid basically in the upper abdomen and it does not look very accessible, although I'm sure an ultrasound guided paracentesis is possible.  I don't think it would be worth the risk at this point.  I would like to increase her Lasix to 80 mg daily and recheck her renal function in about a month and see her back in 2-3 months.

## 2018-06-21 ENCOUNTER — LAB (OUTPATIENT)
Dept: LAB | Facility: HOSPITAL | Age: 58
End: 2018-06-21

## 2018-06-21 ENCOUNTER — INFUSION (OUTPATIENT)
Dept: ONCOLOGY | Facility: HOSPITAL | Age: 58
End: 2018-06-21

## 2018-06-21 DIAGNOSIS — D73.1 HYPERSPLENISM: ICD-10-CM

## 2018-06-21 DIAGNOSIS — D61.818 PANCYTOPENIA (HCC): ICD-10-CM

## 2018-06-21 DIAGNOSIS — K74.60 CIRRHOSIS OF LIVER WITHOUT ASCITES, UNSPECIFIED HEPATIC CIRRHOSIS TYPE (HCC): ICD-10-CM

## 2018-06-21 LAB
BASOPHILS # BLD AUTO: 0.01 10*3/MM3 (ref 0–0.1)
BASOPHILS NFR BLD AUTO: 0.2 % (ref 0–1.1)
DEPRECATED RDW RBC AUTO: 55.3 FL (ref 37–49)
EOSINOPHIL # BLD AUTO: 0.01 10*3/MM3 (ref 0–0.36)
EOSINOPHIL NFR BLD AUTO: 0.2 % (ref 1–5)
ERYTHROCYTE [DISTWIDTH] IN BLOOD BY AUTOMATED COUNT: 16.8 % (ref 11.7–14.5)
HCT VFR BLD AUTO: 38.2 % (ref 34–45)
HGB BLD-MCNC: 12.3 G/DL (ref 11.5–14.9)
IMM GRANULOCYTES # BLD: 0.05 10*3/MM3 (ref 0–0.03)
IMM GRANULOCYTES NFR BLD: 1.1 % (ref 0–0.5)
LYMPHOCYTES # BLD AUTO: 0.48 10*3/MM3 (ref 1–3.5)
LYMPHOCYTES NFR BLD AUTO: 11 % (ref 20–49)
MCH RBC QN AUTO: 28.9 PG (ref 27–33)
MCHC RBC AUTO-ENTMCNC: 32.2 G/DL (ref 32–35)
MCV RBC AUTO: 89.7 FL (ref 83–97)
MONOCYTES # BLD AUTO: 0.15 10*3/MM3 (ref 0.25–0.8)
MONOCYTES NFR BLD AUTO: 3.4 % (ref 4–12)
NEUTROPHILS # BLD AUTO: 3.66 10*3/MM3 (ref 1.5–7)
NEUTROPHILS NFR BLD AUTO: 84.1 % (ref 39–75)
NRBC BLD MANUAL-RTO: 0 /100 WBC (ref 0–0)
PLATELET # BLD AUTO: 86 10*3/MM3 (ref 150–375)
PMV BLD AUTO: 10.7 FL (ref 8.9–12.1)
RBC # BLD AUTO: 4.26 10*6/MM3 (ref 3.9–5)
WBC NRBC COR # BLD: 4.36 10*3/MM3 (ref 4–10)

## 2018-06-21 PROCEDURE — 85025 COMPLETE CBC W/AUTO DIFF WBC: CPT | Performed by: INTERNAL MEDICINE

## 2018-06-21 PROCEDURE — 36416 COLLJ CAPILLARY BLOOD SPEC: CPT | Performed by: INTERNAL MEDICINE

## 2018-06-21 NOTE — PROGRESS NOTES
"CBC reviewed w/ patient. Patient denies any s/s of bleeding. States she feels \"run down\" but that it has improved since her appointment earlier this week. She denies any other concerns and will return Monday for additional labs at treatment.    Lab Results   Component Value Date    WBC 4.36 06/21/2018    HGB 12.3 06/21/2018    HCT 38.2 06/21/2018    MCV 89.7 06/21/2018    PLT 86 (L) 06/21/2018     "

## 2018-06-25 ENCOUNTER — INFUSION (OUTPATIENT)
Dept: ONCOLOGY | Facility: HOSPITAL | Age: 58
End: 2018-06-25

## 2018-06-25 ENCOUNTER — OFFICE VISIT (OUTPATIENT)
Dept: ONCOLOGY | Facility: CLINIC | Age: 58
End: 2018-06-25

## 2018-06-25 ENCOUNTER — LAB (OUTPATIENT)
Dept: LAB | Facility: HOSPITAL | Age: 58
End: 2018-06-25

## 2018-06-25 VITALS
DIASTOLIC BLOOD PRESSURE: 78 MMHG | HEIGHT: 66 IN | HEART RATE: 103 BPM | TEMPERATURE: 99 F | RESPIRATION RATE: 12 BRPM | SYSTOLIC BLOOD PRESSURE: 126 MMHG | BODY MASS INDEX: 28.25 KG/M2 | WEIGHT: 175.8 LBS | OXYGEN SATURATION: 98 %

## 2018-06-25 VITALS — SYSTOLIC BLOOD PRESSURE: 102 MMHG | DIASTOLIC BLOOD PRESSURE: 54 MMHG | HEART RATE: 115 BPM

## 2018-06-25 DIAGNOSIS — K74.69 OTHER CIRRHOSIS OF LIVER (HCC): ICD-10-CM

## 2018-06-25 DIAGNOSIS — D69.3 ACUTE ITP (HCC): Primary | ICD-10-CM

## 2018-06-25 DIAGNOSIS — D69.3 ACUTE ITP (HCC): ICD-10-CM

## 2018-06-25 DIAGNOSIS — D61.818 PANCYTOPENIA (HCC): ICD-10-CM

## 2018-06-25 DIAGNOSIS — K74.60 CIRRHOSIS OF LIVER WITHOUT ASCITES, UNSPECIFIED HEPATIC CIRRHOSIS TYPE (HCC): ICD-10-CM

## 2018-06-25 DIAGNOSIS — D73.1 HYPERSPLENISM: ICD-10-CM

## 2018-06-25 DIAGNOSIS — M32.9 SYSTEMIC LUPUS ERYTHEMATOSUS, UNSPECIFIED SLE TYPE, UNSPECIFIED ORGAN INVOLVEMENT STATUS (HCC): ICD-10-CM

## 2018-06-25 DIAGNOSIS — M06.9 RHEUMATOID ARTHRITIS, INVOLVING UNSPECIFIED SITE, UNSPECIFIED RHEUMATOID FACTOR PRESENCE: ICD-10-CM

## 2018-06-25 DIAGNOSIS — R18.8 OTHER ASCITES: ICD-10-CM

## 2018-06-25 LAB
ALBUMIN SERPL-MCNC: 3.6 G/DL (ref 3.5–5.2)
ALBUMIN/GLOB SERPL: 1 G/DL (ref 1.1–2.4)
ALP SERPL-CCNC: 240 U/L (ref 38–116)
ALT SERPL W P-5'-P-CCNC: 105 U/L (ref 0–33)
ANION GAP SERPL CALCULATED.3IONS-SCNC: 9.9 MMOL/L
AST SERPL-CCNC: 77 U/L (ref 0–32)
BASOPHILS # BLD AUTO: 0.02 10*3/MM3 (ref 0–0.1)
BASOPHILS NFR BLD AUTO: 0.7 % (ref 0–1.1)
BILIRUB SERPL-MCNC: 1.2 MG/DL (ref 0.1–1.2)
BUN BLD-MCNC: 10 MG/DL (ref 6–20)
BUN/CREAT SERPL: 11.5 (ref 7.3–30)
CALCIUM SPEC-SCNC: 9.1 MG/DL (ref 8.5–10.2)
CHLORIDE SERPL-SCNC: 95 MMOL/L (ref 98–107)
CO2 SERPL-SCNC: 29.1 MMOL/L (ref 22–29)
CREAT BLD-MCNC: 0.87 MG/DL (ref 0.6–1.1)
DEPRECATED RDW RBC AUTO: 55.7 FL (ref 37–49)
EOSINOPHIL # BLD AUTO: 0.07 10*3/MM3 (ref 0–0.36)
EOSINOPHIL NFR BLD AUTO: 2.3 % (ref 1–5)
ERYTHROCYTE [DISTWIDTH] IN BLOOD BY AUTOMATED COUNT: 16.2 % (ref 11.7–14.5)
FERRITIN SERPL-MCNC: 165.5 NG/ML (ref 11–207)
GFR SERPL CREATININE-BSD FRML MDRD: 68 ML/MIN/1.73
GLOBULIN UR ELPH-MCNC: 3.5 GM/DL (ref 1.8–3.5)
GLUCOSE BLD-MCNC: 446 MG/DL (ref 74–124)
HBV SURFACE AB SER RIA-ACNC: REACTIVE
HBV SURFACE AG SERPL QL IA: NORMAL
HCT VFR BLD AUTO: 41.1 % (ref 34–45)
HGB BLD-MCNC: 12.8 G/DL (ref 11.5–14.9)
IMM GRANULOCYTES # BLD: 0.06 10*3/MM3 (ref 0–0.03)
IMM GRANULOCYTES NFR BLD: 2 % (ref 0–0.5)
IRON 24H UR-MRATE: 83 MCG/DL (ref 37–145)
IRON SATN MFR SERPL: 22 % (ref 14–48)
LYMPHOCYTES # BLD AUTO: 0.59 10*3/MM3 (ref 1–3.5)
LYMPHOCYTES NFR BLD AUTO: 19.8 % (ref 20–49)
MCH RBC QN AUTO: 29 PG (ref 27–33)
MCHC RBC AUTO-ENTMCNC: 31.1 G/DL (ref 32–35)
MCV RBC AUTO: 93.2 FL (ref 83–97)
MONOCYTES # BLD AUTO: 0.3 10*3/MM3 (ref 0.25–0.8)
MONOCYTES NFR BLD AUTO: 10.1 % (ref 4–12)
NEUTROPHILS # BLD AUTO: 1.94 10*3/MM3 (ref 1.5–7)
NEUTROPHILS NFR BLD AUTO: 65.1 % (ref 39–75)
NRBC BLD MANUAL-RTO: 0 /100 WBC (ref 0–0)
PLATELET # BLD AUTO: 89 10*3/MM3 (ref 150–375)
PMV BLD AUTO: 10.6 FL (ref 8.9–12.1)
POTASSIUM BLD-SCNC: 5.1 MMOL/L (ref 3.5–4.7)
PROT SERPL-MCNC: 7.1 G/DL (ref 6.3–8)
RBC # BLD AUTO: 4.41 10*6/MM3 (ref 3.9–5)
SODIUM BLD-SCNC: 134 MMOL/L (ref 134–145)
TIBC SERPL-MCNC: 377 MCG/DL (ref 249–505)
TRANSFERRIN SERPL-MCNC: 269 MG/DL (ref 200–360)
VIT B12 BLD-MCNC: 1368 PG/ML (ref 211–946)
WBC NRBC COR # BLD: 2.98 10*3/MM3 (ref 4–10)

## 2018-06-25 PROCEDURE — 96375 TX/PRO/DX INJ NEW DRUG ADDON: CPT | Performed by: NURSE PRACTITIONER

## 2018-06-25 PROCEDURE — 36415 COLL VENOUS BLD VENIPUNCTURE: CPT | Performed by: INTERNAL MEDICINE

## 2018-06-25 PROCEDURE — 80053 COMPREHEN METABOLIC PANEL: CPT

## 2018-06-25 PROCEDURE — 82607 VITAMIN B-12: CPT | Performed by: INTERNAL MEDICINE

## 2018-06-25 PROCEDURE — 25010000002 RITUXIMAB 10 MG/ML SOLUTION 10 ML VIAL: Performed by: INTERNAL MEDICINE

## 2018-06-25 PROCEDURE — 83540 ASSAY OF IRON: CPT

## 2018-06-25 PROCEDURE — 84466 ASSAY OF TRANSFERRIN: CPT

## 2018-06-25 PROCEDURE — 99214 OFFICE O/P EST MOD 30 MIN: CPT | Performed by: NURSE PRACTITIONER

## 2018-06-25 PROCEDURE — 86706 HEP B SURFACE ANTIBODY: CPT | Performed by: INTERNAL MEDICINE

## 2018-06-25 PROCEDURE — 85025 COMPLETE CBC W/AUTO DIFF WBC: CPT | Performed by: INTERNAL MEDICINE

## 2018-06-25 PROCEDURE — 25010000002 RITUXIMAB 10 MG/ML SOLUTION 50 ML VIAL: Performed by: INTERNAL MEDICINE

## 2018-06-25 PROCEDURE — 82728 ASSAY OF FERRITIN: CPT

## 2018-06-25 PROCEDURE — 25010000002 DIPHENHYDRAMINE: Performed by: INTERNAL MEDICINE

## 2018-06-25 PROCEDURE — 87340 HEPATITIS B SURFACE AG IA: CPT | Performed by: INTERNAL MEDICINE

## 2018-06-25 PROCEDURE — 96415 CHEMO IV INFUSION ADDL HR: CPT | Performed by: NURSE PRACTITIONER

## 2018-06-25 PROCEDURE — 96413 CHEMO IV INFUSION 1 HR: CPT | Performed by: NURSE PRACTITIONER

## 2018-06-25 RX ORDER — ACETAMINOPHEN 325 MG/1
650 TABLET ORAL ONCE
Status: COMPLETED | OUTPATIENT
Start: 2018-06-25 | End: 2018-06-25

## 2018-06-25 RX ORDER — SODIUM CHLORIDE 9 MG/ML
250 INJECTION, SOLUTION INTRAVENOUS ONCE
Status: COMPLETED | OUTPATIENT
Start: 2018-06-25 | End: 2018-06-25

## 2018-06-25 RX ORDER — FAMOTIDINE 10 MG/ML
20 INJECTION, SOLUTION INTRAVENOUS ONCE
Status: COMPLETED | OUTPATIENT
Start: 2018-06-25 | End: 2018-06-25

## 2018-06-25 RX ADMIN — DIPHENHYDRAMINE HYDROCHLORIDE 50 MG: 50 INJECTION INTRAMUSCULAR; INTRAVENOUS at 08:53

## 2018-06-25 RX ADMIN — SODIUM CHLORIDE 250 ML: 9 INJECTION, SOLUTION INTRAVENOUS at 08:49

## 2018-06-25 RX ADMIN — FAMOTIDINE 20 MG: 10 INJECTION, SOLUTION INTRAVENOUS at 08:50

## 2018-06-25 RX ADMIN — RITUXIMAB 700 MG: 10 INJECTION, SOLUTION INTRAVENOUS at 09:39

## 2018-06-25 RX ADMIN — ACETAMINOPHEN 650 MG: 325 TABLET, FILM COATED ORAL at 08:49

## 2018-06-25 NOTE — PROGRESS NOTES
REASON FOR FOLLOWUP/CHIEF COMPLAINT:  ITP  1. 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   2. Evidence of B12 deficiency on her initial lab evaluation in April 2013.  Patient was started on parenteral B12 replacement.  The patient was switched from shots to daily oral B12 replacement after the visit of 12/02/2013.    3. Hemoglobin has significantly improved since banding of her esophageal varices and increase in her oral iron supplement to twice daily dosing.  4. Morphologically normal bone marrow from 08/20/2013 with normal flow cytometry.  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  Significance of this is uncertain.    5. Mixed connective tissue disorder, currently on Enbrel therapy.  6. Insulin-requiring diabetes mellitus.  7. Patient continues to follow-up periodically with the transplant team at [facility] but currently she is not on the transplant list.  8. 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  She has had GI bleeding in the past due to portal hypertension.      She comes in today for follow-up on her ITP with plans to initiate Rituxan weekly ×4.  She has been taking prednisone 40 mg daily.  She does report some shaking of her hands.  Her blood sugars are been out of control, which does not sound unusual for her.  She does take her sugar before every meal and use a sliding scale insulin.  She continues to have intermittent fevers up to 100°F.  She reports severe fatigue.         Past Medical History, Past Surgical History, Social History, Family History have been reviewed and are without significant changes except as mentioned.    Review of Systems   Review of Systems   Constitutional: Positive for fatigue and  "fever. Negative for activity change.   HENT: Negative for mouth sores, nosebleeds and trouble swallowing.    Respiratory: Negative for cough, shortness of breath and wheezing.    Cardiovascular: Negative for chest pain and palpitations.   Gastrointestinal: Positive for constipation. Negative for diarrhea and nausea.   Genitourinary: Negative for dysuria and hematuria.   Musculoskeletal: Negative for arthralgias and myalgias.   Skin: Negative for rash and wound.   Neurological: Negative for seizures and syncope.   Hematological: Negative for adenopathy. Bruises/bleeds easily.   Psychiatric/Behavioral: Negative for confusion.       Medications:  The current medication list was reviewed in the EMR    ALLERGIES:    Allergies   Allergen Reactions   • Albuterol Anaphylaxis   • Lactulose Nausea And Vomiting and Other (See Comments)     Severe abdominal pain   • Tramadol Other (See Comments)     Per pt causes her \"palsy, meaning shaking and tremors\" and gi upset, nausea   • Quinine Derivatives Nausea And Vomiting              Vitals:    06/25/18 0752   BP: 126/78   Pulse: 103   Resp: 12   Temp: 99 °F (37.2 °C)   TempSrc: Oral   SpO2: 98%   Weight: 79.7 kg (175 lb 12.8 oz)   Height: 168.5 cm (66.34\")   PainSc: 7  Comment: all over pain     Physical Exam    CONSTITUTIONAL:  Vital signs reviewed.  No distress, looks comfortable.  EYES:  Conjunctiva and lids unremarkable.  PERRLA  EARS,NOSE,MOUTH,THROAT:  Ears and nose appear unremarkable.  Lips, teeth, gums appear unremarkable.  RESPIRATORY:  Normal respiratory effort.  Lungs clear to auscultation bilaterally.  CARDIOVASCULAR:  Normal S1, S2.  No murmurs rubs or gallops.  No significant lower extremity edema.  GASTROINTESTINAL: Abdomen appears unremarkable.  Nontender.  No hepatomegaly.  No splenomegaly.  MUSCULOSKELETAL:  Unremarkable digits/nails.  No cyanosis or clubbing.  SKIN:  Warm.  No rashes.  PSYCHIATRIC:  Normal judgment and insight.  Normal mood and affect.   "     RECENT LABS:  WBC   Date Value Ref Range Status   06/25/2018 2.98 (L) 4.00 - 10.00 10*3/mm3 Final     Hemoglobin   Date Value Ref Range Status   06/25/2018 12.8 11.5 - 14.9 g/dL Final     Platelets   Date Value Ref Range Status   06/25/2018 89 (L) 150 - 375 10*3/mm3 Final     Lab Results   Component Value Date    NEUTROABS 1.94 06/25/2018                 ASSESSMENT/PLAN:     *ITP.  Appears to be most consistent with ITP.  Peripheral smear shows no significant amount of schistocytes and no platelet clumping.  IPF elevated at 18.4%, consistent with a destructive etiology of thrombocytopenia.  Unremarkable: Folate, B12, fibrinogen, PTT.  Did not respond to platelet transfusion, also consistent with a destructive process.  She received 5 daily doses of IVIG in the hospital and was discharged on prednisone 60 mg daily.  Prednisone was decreased to 40 mg daily on 6/18/2018.  Her platelet count is stable from her last office visit and therefore we will decrease her prednisone to 20 mg daily as discussed with Dr. Mcdowell.      *Mild elevation in INR at 1.26.  Suspect this is due to cirrhosis.     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  WBC has declined to 2.98 today.  Platelet count is stable at 89,000, was at 86,000 last week.     *B12 deficiency.  On oral B12 since December 2013.     *History of esophageal varices banding.     *Iron deficiency anemia due to GI bleeding.  Has been on oral iron twice per day.  Ferritin 51 with 10% saturation in the hospital.  She received 2 doses of IV Venofer prior to discharge.  Ferritin iron panel were performed today, within normal limits.    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  This is of unknown significance.     *Rheumatoid arthritis.  Reports she hasn't been unable to take Enbrel since  December due to persistent fevers.     *Lupus     *Diabetes.  Hyperglycemia from steroids may be a problem.  Defer to hospitalist.     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        Plan  · Decrease prednisone to 20 mg daily.  · Initiate 4 weekly doses of IV Rituxan today, in the hopes of controlling her ITP and allowing her to get off high-dose prednisone more quickly.   · We will continue to check her blood counts weekly.  · Return in 1 week for review by Dr. Mcdowell and second dose of Rituxan.

## 2018-06-26 LAB — HBV CORE AB SER DONR QL IA: NEGATIVE

## 2018-06-29 ENCOUNTER — INFUSION (OUTPATIENT)
Dept: ONCOLOGY | Facility: HOSPITAL | Age: 58
End: 2018-06-29

## 2018-06-29 ENCOUNTER — LAB (OUTPATIENT)
Dept: LAB | Facility: HOSPITAL | Age: 58
End: 2018-06-29

## 2018-06-29 ENCOUNTER — TELEPHONE (OUTPATIENT)
Dept: ONCOLOGY | Facility: HOSPITAL | Age: 58
End: 2018-06-29

## 2018-06-29 ENCOUNTER — TELEPHONE (OUTPATIENT)
Dept: GENERAL RADIOLOGY | Facility: HOSPITAL | Age: 58
End: 2018-06-29

## 2018-06-29 DIAGNOSIS — D69.3 ACUTE ITP (HCC): ICD-10-CM

## 2018-06-29 DIAGNOSIS — D69.3 ACUTE ITP (HCC): Primary | ICD-10-CM

## 2018-06-29 LAB
BASOPHILS # BLD AUTO: 0.03 10*3/MM3 (ref 0–0.1)
BASOPHILS NFR BLD AUTO: 0.9 % (ref 0–1.1)
DEPRECATED RDW RBC AUTO: 52.6 FL (ref 37–49)
EOSINOPHIL # BLD AUTO: 0.05 10*3/MM3 (ref 0–0.36)
EOSINOPHIL NFR BLD AUTO: 1.5 % (ref 1–5)
ERYTHROCYTE [DISTWIDTH] IN BLOOD BY AUTOMATED COUNT: 15.9 % (ref 11.7–14.5)
HCT VFR BLD AUTO: 37.5 % (ref 34–45)
HGB BLD-MCNC: 12.3 G/DL (ref 11.5–14.9)
IMM GRANULOCYTES # BLD: 0.04 10*3/MM3 (ref 0–0.03)
IMM GRANULOCYTES NFR BLD: 1.2 % (ref 0–0.5)
LYMPHOCYTES # BLD AUTO: 0.57 10*3/MM3 (ref 1–3.5)
LYMPHOCYTES NFR BLD AUTO: 16.6 % (ref 20–49)
MCH RBC QN AUTO: 29.6 PG (ref 27–33)
MCHC RBC AUTO-ENTMCNC: 32.8 G/DL (ref 32–35)
MCV RBC AUTO: 90.4 FL (ref 83–97)
MONOCYTES # BLD AUTO: 0.41 10*3/MM3 (ref 0.25–0.8)
MONOCYTES NFR BLD AUTO: 12 % (ref 4–12)
NEUTROPHILS # BLD AUTO: 2.33 10*3/MM3 (ref 1.5–7)
NEUTROPHILS NFR BLD AUTO: 67.8 % (ref 39–75)
NRBC BLD MANUAL-RTO: 0 /100 WBC (ref 0–0)
PLATELET # BLD AUTO: 84 10*3/MM3 (ref 150–375)
PMV BLD AUTO: 9.8 FL (ref 8.9–12.1)
RBC # BLD AUTO: 4.15 10*6/MM3 (ref 3.9–5)
WBC NRBC COR # BLD: 3.43 10*3/MM3 (ref 4–10)

## 2018-06-29 PROCEDURE — 85025 COMPLETE CBC W/AUTO DIFF WBC: CPT | Performed by: NURSE PRACTITIONER

## 2018-06-29 PROCEDURE — 36416 COLLJ CAPILLARY BLOOD SPEC: CPT | Performed by: NURSE PRACTITIONER

## 2018-06-29 RX ORDER — FLURBIPROFEN SODIUM 0.3 MG/ML
SOLUTION/ DROPS OPHTHALMIC
Qty: 200 EACH | Refills: 2 | Status: SHIPPED | OUTPATIENT
Start: 2018-06-29 | End: 2019-01-17

## 2018-06-29 NOTE — PROGRESS NOTES
Pt to infusion room for CBC and RN review  Lab Results   Component Value Date    WBC 3.43 (L) 06/29/2018    HGB 12.3 06/29/2018    HCT 37.5 06/29/2018    MCV 90.4 06/29/2018    PLT 84 (L) 06/29/2018     Labs reviewed with patient   Very few petechiae on right arm  No other S/S bleeding noted  Reviewed bleeding precautions with pt  She V/U  Discharged home stable

## 2018-06-29 NOTE — TELEPHONE ENCOUNTER
"Pt calling with concerns over her plt count decreasing, she has new \"purple\" spots on her arms and yesterday, she reports they started yesterday but have increased since then.  She is on prednisone 20 mg daily and received her 1st RTX on Monday 6/25  Reviewed with Teena MALDONADO, pt to come to office today for CBC and RN review  Called and spoke with pt, she stated she will head this way to the office  "

## 2018-06-29 NOTE — TELEPHONE ENCOUNTER
----- Message from Emily Wetzel RN sent at 6/29/2018  3:36 PM EDT -----  Needs CBC and RN review today

## 2018-07-02 ENCOUNTER — INFUSION (OUTPATIENT)
Dept: ONCOLOGY | Facility: HOSPITAL | Age: 58
End: 2018-07-02

## 2018-07-02 ENCOUNTER — LAB (OUTPATIENT)
Dept: LAB | Facility: HOSPITAL | Age: 58
End: 2018-07-02

## 2018-07-02 ENCOUNTER — OFFICE VISIT (OUTPATIENT)
Dept: ONCOLOGY | Facility: CLINIC | Age: 58
End: 2018-07-02

## 2018-07-02 VITALS
DIASTOLIC BLOOD PRESSURE: 62 MMHG | OXYGEN SATURATION: 99 % | TEMPERATURE: 99 F | HEIGHT: 66 IN | HEART RATE: 99 BPM | SYSTOLIC BLOOD PRESSURE: 126 MMHG | WEIGHT: 175.6 LBS | RESPIRATION RATE: 12 BRPM | BODY MASS INDEX: 28.22 KG/M2

## 2018-07-02 VITALS — SYSTOLIC BLOOD PRESSURE: 105 MMHG | DIASTOLIC BLOOD PRESSURE: 51 MMHG | HEART RATE: 97 BPM

## 2018-07-02 DIAGNOSIS — D69.3 ACUTE ITP (HCC): Primary | ICD-10-CM

## 2018-07-02 DIAGNOSIS — D69.3 ACUTE ITP (HCC): ICD-10-CM

## 2018-07-02 DIAGNOSIS — D61.818 PANCYTOPENIA (HCC): ICD-10-CM

## 2018-07-02 DIAGNOSIS — M32.9 SYSTEMIC LUPUS ERYTHEMATOSUS, UNSPECIFIED SLE TYPE, UNSPECIFIED ORGAN INVOLVEMENT STATUS (HCC): ICD-10-CM

## 2018-07-02 DIAGNOSIS — K74.60 CIRRHOSIS OF LIVER WITHOUT ASCITES, UNSPECIFIED HEPATIC CIRRHOSIS TYPE (HCC): ICD-10-CM

## 2018-07-02 DIAGNOSIS — D73.1 HYPERSPLENISM: ICD-10-CM

## 2018-07-02 DIAGNOSIS — M06.9 RHEUMATOID ARTHRITIS, INVOLVING UNSPECIFIED SITE, UNSPECIFIED RHEUMATOID FACTOR PRESENCE: ICD-10-CM

## 2018-07-02 LAB
BASOPHILS # BLD AUTO: 0.02 10*3/MM3 (ref 0–0.1)
BASOPHILS NFR BLD AUTO: 0.6 % (ref 0–1.1)
DEPRECATED RDW RBC AUTO: 49.6 FL (ref 37–49)
EOSINOPHIL # BLD AUTO: 0.07 10*3/MM3 (ref 0–0.36)
EOSINOPHIL NFR BLD AUTO: 1.9 % (ref 1–5)
ERYTHROCYTE [DISTWIDTH] IN BLOOD BY AUTOMATED COUNT: 15.4 % (ref 11.7–14.5)
HCT VFR BLD AUTO: 36.7 % (ref 34–45)
HGB BLD-MCNC: 12.4 G/DL (ref 11.5–14.9)
IMM GRANULOCYTES # BLD: 0.06 10*3/MM3 (ref 0–0.03)
IMM GRANULOCYTES NFR BLD: 1.7 % (ref 0–0.5)
LYMPHOCYTES # BLD AUTO: 0.77 10*3/MM3 (ref 1–3.5)
LYMPHOCYTES NFR BLD AUTO: 21.3 % (ref 20–49)
MCH RBC QN AUTO: 29.7 PG (ref 27–33)
MCHC RBC AUTO-ENTMCNC: 33.8 G/DL (ref 32–35)
MCV RBC AUTO: 88 FL (ref 83–97)
MONOCYTES # BLD AUTO: 0.36 10*3/MM3 (ref 0.25–0.8)
MONOCYTES NFR BLD AUTO: 9.9 % (ref 4–12)
NEUTROPHILS # BLD AUTO: 2.34 10*3/MM3 (ref 1.5–7)
NEUTROPHILS NFR BLD AUTO: 64.6 % (ref 39–75)
NRBC BLD MANUAL-RTO: 0.6 /100 WBC (ref 0–0)
PLATELET # BLD AUTO: 89 10*3/MM3 (ref 150–375)
PLATELETS.RETICULATED NFR BLD AUTO: 4.6 % (ref 1.1–6.1)
PMV BLD AUTO: 10.3 FL (ref 8.9–12.1)
RBC # BLD AUTO: 4.17 10*6/MM3 (ref 3.9–5)
WBC NRBC COR # BLD: 3.62 10*3/MM3 (ref 4–10)

## 2018-07-02 PROCEDURE — 96375 TX/PRO/DX INJ NEW DRUG ADDON: CPT | Performed by: INTERNAL MEDICINE

## 2018-07-02 PROCEDURE — 25010000002 RITUXIMAB 10 MG/ML SOLUTION 50 ML VIAL: Performed by: INTERNAL MEDICINE

## 2018-07-02 PROCEDURE — 25010000002 DIPHENHYDRAMINE PER 50 MG: Performed by: INTERNAL MEDICINE

## 2018-07-02 PROCEDURE — 99214 OFFICE O/P EST MOD 30 MIN: CPT | Performed by: INTERNAL MEDICINE

## 2018-07-02 PROCEDURE — 85055 RETICULATED PLATELET ASSAY: CPT | Performed by: INTERNAL MEDICINE

## 2018-07-02 PROCEDURE — 96415 CHEMO IV INFUSION ADDL HR: CPT | Performed by: INTERNAL MEDICINE

## 2018-07-02 PROCEDURE — 85025 COMPLETE CBC W/AUTO DIFF WBC: CPT | Performed by: INTERNAL MEDICINE

## 2018-07-02 PROCEDURE — 36416 COLLJ CAPILLARY BLOOD SPEC: CPT | Performed by: INTERNAL MEDICINE

## 2018-07-02 PROCEDURE — 25010000002 RITUXIMAB 10 MG/ML SOLUTION 10 ML VIAL: Performed by: INTERNAL MEDICINE

## 2018-07-02 PROCEDURE — 96413 CHEMO IV INFUSION 1 HR: CPT | Performed by: INTERNAL MEDICINE

## 2018-07-02 RX ORDER — ACETAMINOPHEN 325 MG/1
650 TABLET ORAL ONCE
Status: DISCONTINUED | OUTPATIENT
Start: 2018-07-02 | End: 2018-07-02 | Stop reason: HOSPADM

## 2018-07-02 RX ORDER — FAMOTIDINE 10 MG/ML
20 INJECTION, SOLUTION INTRAVENOUS ONCE
Status: COMPLETED | OUTPATIENT
Start: 2018-07-02 | End: 2018-07-02

## 2018-07-02 RX ORDER — SODIUM CHLORIDE 9 MG/ML
250 INJECTION, SOLUTION INTRAVENOUS ONCE
Status: COMPLETED | OUTPATIENT
Start: 2018-07-02 | End: 2018-07-02

## 2018-07-02 RX ADMIN — FAMOTIDINE 20 MG: 10 INJECTION, SOLUTION INTRAVENOUS at 10:22

## 2018-07-02 RX ADMIN — RITUXIMAB 700 MG: 10 INJECTION, SOLUTION INTRAVENOUS at 11:14

## 2018-07-02 RX ADMIN — DIPHENHYDRAMINE HYDROCHLORIDE 25 MG: 50 INJECTION, SOLUTION INTRAMUSCULAR; INTRAVENOUS at 10:23

## 2018-07-02 RX ADMIN — SODIUM CHLORIDE 250 ML: 9 INJECTION, SOLUTION INTRAVENOUS at 10:22

## 2018-07-02 NOTE — PROGRESS NOTES
REASON FOR FOLLOWUP/CHIEF COMPLAINT:  ITP  1. 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   2. Evidence of B12 deficiency on her initial lab evaluation in April 2013.  Patient was started on parenteral B12 replacement.  The patient was switched from shots to daily oral B12 replacement after the visit of 12/02/2013.    3. Hemoglobin has significantly improved since banding of her esophageal varices and increase in her oral iron supplement to twice daily dosing.  4. Morphologically normal bone marrow from 08/20/2013 with normal flow cytometry.  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  Significance of this is uncertain.    5. Mixed connective tissue disorder, currently on Enbrel therapy.  6. Insulin-requiring diabetes mellitus.  7. Patient continues to follow-up periodically with the transplant team at [facility] but currently she is not on the transplant list.  8. 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  She has had GI bleeding in the past due to portal hypertension.      She comes in today for follow-up on her ITP receiving Rituxan weekly ×4.  She is due today for cycle #2 Rituxan.  Her platelet count is 89,000.  She seems to be tolerating treatment well thus far and we will further taper her prednisone.       Past Medical History, Past Surgical History, Social History, Family History have been reviewed and are without significant changes except as mentioned.    Review of Systems   Review of Systems   Constitutional: Positive for fatigue and fever. Negative for activity change.   HENT: Negative for mouth sores, nosebleeds and trouble swallowing.    Respiratory: Negative for cough, shortness of breath and wheezing.   "  Cardiovascular: Negative for chest pain and palpitations.   Gastrointestinal: Positive for constipation. Negative for diarrhea and nausea.   Genitourinary: Negative for dysuria and hematuria.   Musculoskeletal: Negative for arthralgias and myalgias.   Skin: Negative for rash and wound.   Neurological: Negative for seizures and syncope.   Hematological: Negative for adenopathy. Bruises/bleeds easily.   Psychiatric/Behavioral: Negative for confusion.       Medications:  The current medication list was reviewed in the EMR    ALLERGIES:    Allergies   Allergen Reactions   • Albuterol Anaphylaxis   • Lactulose Nausea And Vomiting and Other (See Comments)     Severe abdominal pain   • Tramadol Other (See Comments)     Per pt causes her \"palsy, meaning shaking and tremors\" and gi upset, nausea   • Quinine Derivatives Nausea And Vomiting              Vitals:    07/02/18 0915   BP: 126/62   Pulse: 99   Resp: 12   Temp: 99 °F (37.2 °C)   TempSrc: Oral   SpO2: 99%   Weight: 79.7 kg (175 lb 9.6 oz)   Height: 168.5 cm (66.34\")   PainSc: 7  Comment: all over pain     Physical Exam    CONSTITUTIONAL:  Vital signs reviewed.  No distress, looks comfortable.  EYES:  Conjunctiva and lids unremarkable.  PERRLA  EARS,NOSE,MOUTH,THROAT:  Ears and nose appear unremarkable.  Lips, teeth, gums appear unremarkable.  RESPIRATORY:  Normal respiratory effort.  Lungs clear to auscultation bilaterally.  CARDIOVASCULAR:  Normal S1, S2.  No murmurs rubs or gallops.  No significant lower extremity edema.  GASTROINTESTINAL: Abdomen appears unremarkable.  Nontender.  No hepatomegaly.  No splenomegaly.  MUSCULOSKELETAL:  Unremarkable digits/nails.  No cyanosis or clubbing.  SKIN:  Warm.  No rashes.  PSYCHIATRIC:  Normal judgment and insight.  Normal mood and affect.       RECENT LABS:  WBC   Date Value Ref Range Status   07/02/2018 3.62 (L) 4.00 - 10.00 10*3/mm3 Final   06/29/2018 3.43 (L) 4.00 - 10.00 10*3/mm3 Final     Hemoglobin   Date Value Ref " Range Status   07/02/2018 12.4 11.5 - 14.9 g/dL Final   06/29/2018 12.3 11.5 - 14.9 g/dL Final     Platelets   Date Value Ref Range Status   07/02/2018 89 (L) 150 - 375 10*3/mm3 Final   06/29/2018 84 (L) 150 - 375 10*3/mm3 Final     Lab Results   Component Value Date    NEUTROABS 2.34 07/02/2018                 ASSESSMENT/PLAN:     *ITP.  Appears to be most consistent with ITP.  Peripheral smear shows no significant amount of schistocytes and no platelet clumping.  IPF elevated at 18.4%, consistent with a destructive etiology of thrombocytopenia.  Unremarkable: Folate, B12, fibrinogen, PTT.  Did not respond to platelet transfusion, also consistent with a destructive process.  She received 5 daily doses of IVIG in the hospital and was discharged on prednisone 60 mg daily.  Prednisone was decreased to 20 mg daily her last office visit and therefore we will decrease her prednisone further to 10 mg daily.      *Mild elevation in INR at 1.26.  Suspect this is due to cirrhosis.     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  WBC has declined to 2.98 today.  Platelet count is stable at 89,000, was at 86,000 last week.     *B12 deficiency.  On oral B12 since December 2013.     *History of esophageal varices banding.     *Iron deficiency anemia due to GI bleeding.  Has been on oral iron twice per day.  Ferritin 51 with 10% saturation in the hospital.  She received 2 doses of IV Venofer prior to discharge.  Ferritin iron panel were performed today, within normal limits.    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  This is of unknown significance.     *Rheumatoid arthritis.  Reports she hasn't been unable to take Enbrel since December due to persistent fevers.     *Lupus     *Diabetes.  Hyperglycemia from steroids may be a problem.  Defer to  hospitalist.     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        Plan  · Decrease prednisone to 10 mg daily.  · Second dose of IV Rituxan today  · We will continue to check her blood counts weekly.  · Return in 1 week for lab and third dose of Rituxan.  · M.D. and lab plus Rituxan treatment in 2 weeks.  That will be her fourth and final planned treatment.

## 2018-07-05 RX ORDER — LEVETIRACETAM 500 MG/1
TABLET ORAL
Qty: 60 TABLET | Refills: 0 | Status: SHIPPED | OUTPATIENT
Start: 2018-07-05 | End: 2018-08-07 | Stop reason: SDUPTHER

## 2018-07-09 ENCOUNTER — TELEPHONE (OUTPATIENT)
Dept: GASTROENTEROLOGY | Facility: CLINIC | Age: 58
End: 2018-07-09

## 2018-07-09 ENCOUNTER — INFUSION (OUTPATIENT)
Dept: ONCOLOGY | Facility: HOSPITAL | Age: 58
End: 2018-07-09

## 2018-07-09 ENCOUNTER — LAB (OUTPATIENT)
Dept: ENDOCRINOLOGY | Age: 58
End: 2018-07-09

## 2018-07-09 ENCOUNTER — TELEPHONE (OUTPATIENT)
Dept: INTERNAL MEDICINE | Facility: CLINIC | Age: 58
End: 2018-07-09

## 2018-07-09 VITALS
HEART RATE: 99 BPM | OXYGEN SATURATION: 99 % | SYSTOLIC BLOOD PRESSURE: 132 MMHG | DIASTOLIC BLOOD PRESSURE: 78 MMHG | BODY MASS INDEX: 29.4 KG/M2 | WEIGHT: 184 LBS | TEMPERATURE: 98.6 F

## 2018-07-09 DIAGNOSIS — E78.5 HYPERLIPIDEMIA, UNSPECIFIED HYPERLIPIDEMIA TYPE: ICD-10-CM

## 2018-07-09 DIAGNOSIS — IMO0002 UNCONTROLLED TYPE 2 DIABETES MELLITUS WITH COMPLICATION, WITH LONG-TERM CURRENT USE OF INSULIN: ICD-10-CM

## 2018-07-09 DIAGNOSIS — K74.60 CIRRHOSIS OF LIVER WITHOUT ASCITES, UNSPECIFIED HEPATIC CIRRHOSIS TYPE (HCC): Primary | ICD-10-CM

## 2018-07-09 DIAGNOSIS — E11.42 DIABETIC PERIPHERAL NEUROPATHY (HCC): ICD-10-CM

## 2018-07-09 DIAGNOSIS — IMO0002 TYPE 2 DIABETES MELLITUS, UNCONTROLLED, WITH NEUROPATHY: ICD-10-CM

## 2018-07-09 DIAGNOSIS — D69.3 ACUTE ITP (HCC): Primary | ICD-10-CM

## 2018-07-09 DIAGNOSIS — K75.81 NASH (NONALCOHOLIC STEATOHEPATITIS): ICD-10-CM

## 2018-07-09 DIAGNOSIS — K31.84 GASTROPARESIS: ICD-10-CM

## 2018-07-09 DIAGNOSIS — E78.49 OTHER HYPERLIPIDEMIA: ICD-10-CM

## 2018-07-09 LAB
BASOPHILS # BLD AUTO: 0.02 10*3/MM3 (ref 0–0.2)
BASOPHILS NFR BLD AUTO: 0.8 % (ref 0–1.5)
DEPRECATED RDW RBC AUTO: 53 FL (ref 37–54)
EOSINOPHIL # BLD AUTO: 0.06 10*3/MM3 (ref 0–0.7)
EOSINOPHIL NFR BLD AUTO: 2.4 % (ref 0.3–6.2)
ERYTHROCYTE [DISTWIDTH] IN BLOOD BY AUTOMATED COUNT: 15.9 % (ref 11.7–13)
HCT VFR BLD AUTO: 35.5 % (ref 35.6–45.5)
HGB BLD-MCNC: 11.6 G/DL (ref 11.9–15.5)
IMM GRANULOCYTES # BLD: 0.05 10*3/MM3 (ref 0–0.03)
IMM GRANULOCYTES NFR BLD: 2 % (ref 0–0.5)
LYMPHOCYTES # BLD AUTO: 0.41 10*3/MM3 (ref 0.9–4.8)
LYMPHOCYTES NFR BLD AUTO: 16.1 % (ref 19.6–45.3)
MCH RBC QN AUTO: 29.6 PG (ref 26.9–32)
MCHC RBC AUTO-ENTMCNC: 32.7 G/DL (ref 32.4–36.3)
MCV RBC AUTO: 90.6 FL (ref 80.5–98.2)
MONOCYTES # BLD AUTO: 0.25 10*3/MM3 (ref 0.2–1.2)
MONOCYTES NFR BLD AUTO: 9.8 % (ref 5–12)
NEUTROPHILS # BLD AUTO: 1.76 10*3/MM3 (ref 1.9–8.1)
NEUTROPHILS NFR BLD AUTO: 68.9 % (ref 42.7–76)
NRBC BLD MANUAL-RTO: 0 /100 WBC (ref 0–0)
PLATELET # BLD AUTO: 94 10*3/MM3 (ref 140–500)
PMV BLD AUTO: 10.8 FL (ref 6–12)
RBC # BLD AUTO: 3.92 10*6/MM3 (ref 3.9–5.2)
WBC NRBC COR # BLD: 2.55 10*3/MM3 (ref 4.5–10.7)

## 2018-07-09 PROCEDURE — 25010000002 RITUXIMAB 10 MG/ML SOLUTION 10 ML VIAL: Performed by: INTERNAL MEDICINE

## 2018-07-09 PROCEDURE — 25010000002 DIPHENHYDRAMINE PER 50 MG: Performed by: INTERNAL MEDICINE

## 2018-07-09 PROCEDURE — 25010000002 RITUXIMAB 10 MG/ML SOLUTION 50 ML VIAL: Performed by: INTERNAL MEDICINE

## 2018-07-09 PROCEDURE — 96415 CHEMO IV INFUSION ADDL HR: CPT | Performed by: NURSE PRACTITIONER

## 2018-07-09 PROCEDURE — 85025 COMPLETE CBC W/AUTO DIFF WBC: CPT | Performed by: INTERNAL MEDICINE

## 2018-07-09 PROCEDURE — 96375 TX/PRO/DX INJ NEW DRUG ADDON: CPT | Performed by: NURSE PRACTITIONER

## 2018-07-09 PROCEDURE — 96413 CHEMO IV INFUSION 1 HR: CPT | Performed by: NURSE PRACTITIONER

## 2018-07-09 RX ORDER — SODIUM CHLORIDE 9 MG/ML
250 INJECTION, SOLUTION INTRAVENOUS ONCE
Status: COMPLETED | OUTPATIENT
Start: 2018-07-09 | End: 2018-07-09

## 2018-07-09 RX ORDER — ACETAMINOPHEN 325 MG/1
650 TABLET ORAL ONCE
Status: COMPLETED | OUTPATIENT
Start: 2018-07-09 | End: 2018-07-09

## 2018-07-09 RX ADMIN — RITUXIMAB 700 MG: 10 INJECTION, SOLUTION INTRAVENOUS at 11:25

## 2018-07-09 RX ADMIN — DIPHENHYDRAMINE HYDROCHLORIDE 25 MG: 50 INJECTION, SOLUTION INTRAMUSCULAR; INTRAVENOUS at 10:37

## 2018-07-09 RX ADMIN — ACETAMINOPHEN 650 MG: 325 TABLET, FILM COATED ORAL at 10:34

## 2018-07-09 RX ADMIN — SODIUM CHLORIDE 250 ML: 900 INJECTION, SOLUTION INTRAVENOUS at 10:34

## 2018-07-09 RX ADMIN — FAMOTIDINE 20 MG: 10 INJECTION INTRAVENOUS at 10:35

## 2018-07-09 NOTE — TELEPHONE ENCOUNTER
Patient walked over as she had appointment in Endocrinology with . States she did not say anything to Dr. Burnett but her personality is changing and she feels mean and hateful. Stated Dr. Coleman usually checks her ammonia levels'. Discussed with Dr. Pitts. Patient will be here at 8:45 to be seen by Dr. Pitts.

## 2018-07-09 NOTE — TELEPHONE ENCOUNTER
Patient states the doctor that checks these levels is out of town. She is concerned about personality changes she has been feeling agitated, mean and hateful

## 2018-07-09 NOTE — TELEPHONE ENCOUNTER
"Pt is concerned because she has felt very angry and has been \"hateful\" to everyone (hx cirrhosis).  She would like to have her ammonia level checked.  Advised pt that Dr Coleman out of office entire week and she should either go to ER or call her PCP for further evaluation.  Pt verbalized understanding.    7/12/18, pt called back, she saw PCP 7/10/18 and had ammonia level drawn.  It was elevated (79) and PCP told her to call our office. Pt is currently taking Xifaxan and is allergic to Lactulose. Spoke with pt, and she states her irritability has decreased somewhat, but now having some itchiness, and dizziness.  Had slight nose bleed last night but has since stopped.  Pt does not want to go to ER and would rather discuss symptoms with Dr Coleman when he returns to office on Monday 7/16/18. Also offered to contact on call GI provider but she declined. Advised pt to proceed to ER if symptoms continue and/or worsen.  Message sent to Dr Coleman.    7/16/18, all d/w Dr Coleman.  He advised pt stay away from red meat and eat 1-2 servings Greek Yogurt every day.  Pt verbalized understanding.  "

## 2018-07-09 NOTE — TELEPHONE ENCOUNTER
----- Message from Emily Brandt sent at 7/9/2018  9:25 AM EDT -----  Contact: Patient  Patient calling to see if Dr. Pitts could order an ammonia level test. Please advise    Patient:[phone]

## 2018-07-10 ENCOUNTER — OFFICE VISIT (OUTPATIENT)
Dept: INTERNAL MEDICINE | Facility: CLINIC | Age: 58
End: 2018-07-10

## 2018-07-10 VITALS
BODY MASS INDEX: 29.09 KG/M2 | HEIGHT: 66 IN | WEIGHT: 181 LBS | SYSTOLIC BLOOD PRESSURE: 134 MMHG | DIASTOLIC BLOOD PRESSURE: 66 MMHG

## 2018-07-10 DIAGNOSIS — F41.9 ANXIETY AND DEPRESSION: ICD-10-CM

## 2018-07-10 DIAGNOSIS — F32.A ANXIETY AND DEPRESSION: ICD-10-CM

## 2018-07-10 DIAGNOSIS — K74.60 CIRRHOSIS OF LIVER WITHOUT ASCITES, UNSPECIFIED HEPATIC CIRRHOSIS TYPE (HCC): Primary | ICD-10-CM

## 2018-07-10 LAB
AMMONIA PLAS-MCNC: 79 UMOL/L (ref 11–51)
BUN SERPL-MCNC: 10 MG/DL (ref 6–20)
BUN/CREAT SERPL: 13.7 (ref 7–25)
CALCIUM SERPL-MCNC: 9.1 MG/DL (ref 8.6–10.5)
CHLORIDE SERPL-SCNC: 101 MMOL/L (ref 98–107)
CHOLEST SERPL-MCNC: 206 MG/DL (ref 0–200)
CO2 SERPL-SCNC: 24.4 MMOL/L (ref 22–29)
CREAT SERPL-MCNC: 0.73 MG/DL (ref 0.57–1)
GLUCOSE SERPL-MCNC: 253 MG/DL (ref 65–99)
HBA1C MFR BLD: 8.5 % (ref 4.8–5.6)
HDLC SERPL-MCNC: 72 MG/DL (ref 40–60)
INTERPRETATION: NORMAL
LDLC SERPL CALC-MCNC: 109 MG/DL (ref 0–100)
Lab: NORMAL
POTASSIUM SERPL-SCNC: 4 MMOL/L (ref 3.5–5.2)
SODIUM SERPL-SCNC: 139 MMOL/L (ref 136–145)
TRIGL SERPL-MCNC: 126 MG/DL (ref 0–150)
TSH SERPL DL<=0.005 MIU/L-ACNC: 3.2 MIU/ML (ref 0.27–4.2)
VLDLC SERPL CALC-MCNC: 25.2 MG/DL (ref 5–40)

## 2018-07-10 PROCEDURE — 99213 OFFICE O/P EST LOW 20 MIN: CPT | Performed by: INTERNAL MEDICINE

## 2018-07-10 NOTE — PROGRESS NOTES
Chief Complaint   Patient presents with   • Irritable     personality changes   • Balance Issues     has been off balance        Subjective   Silvia Zabala is a 57 y.o. female.     In the last 2 and half to 3 weeks, she has noticed she has become more irritable, she has mood swings, crying spells, poor concentration, poor memory, forgetful, more anxiety, some thoughts about dying.  Even though she has besides, she says she would never do anything.  Her grandchildren, who are being cared for by other family members, depend on her disability check.  She has also noted some balance problems.  She does not report any falls.  She is concerned that her ammonia level might be high.  Her last ammonia level was done in May 2018 and was normal.         The following portions of the patient's history were reviewed and updated as appropriate: allergies, current medications, past family history, past medical history, past social history, past surgical history and problem list.    Review of Systems   Constitutional: Negative for appetite change, unexpected weight gain and unexpected weight loss.   Gastrointestinal: Negative for abdominal pain.   Psychiatric/Behavioral: Positive for agitation, behavioral problems, decreased concentration and dysphoric mood. The patient is nervous/anxious.          Current Outpatient Prescriptions:   •  acetaminophen (TYLENOL) 325 MG tablet, Take 2 tablets by mouth Every 4 (Four) Hours As Needed for Mild Pain ., Disp: , Rfl:   •  ALPRAZolam (XANAX) 0.5 MG tablet, TAKE ONE TABLET BY MOUTH TWICE A DAY AS NEEDED FOR ANXIETY, Disp: 60 tablet, Rfl: 2  •  B-D UF III MINI PEN NEEDLES 31G X 5 MM misc, USE TO INJECT 5 TO 6 TIMES PER DAY, Disp: 200 each, Rfl: 2  •  buPROPion XL (WELLBUTRIN XL) 150 MG 24 hr tablet, TAKE ONE TABLET BY MOUTH DAILY, Disp: 90 tablet, Rfl: 3  •  cyclobenzaprine (FLEXERIL) 5 MG tablet, Take 1 tablet by mouth 3 (Three) Times a Day As Needed for Muscle Spasms., Disp: , Rfl:   •   cycloSPORINE (RESTASIS) 0.05 % ophthalmic emulsion, 1 drop 2 (two) times a day., Disp: , Rfl:   •  DULoxetine (CYMBALTA) 30 MG capsule, TAKE THREE CAPSULES BY MOUTH DAILY, Disp: 90 capsule, Rfl: 3  •  ferrous sulfate 325 (65 FE) MG tablet, TAKE ONE TABLET BY MOUTH TWICE A DAY, Disp: 60 tablet, Rfl: 0  •  folic acid (FOLVITE) 1 MG tablet, TAKE ONE TABLET BY MOUTH DAILY, Disp: 30 tablet, Rfl: 1  •  furosemide (LASIX) 40 MG tablet, Take 2 tablets by mouth Daily., Disp: 60 tablet, Rfl: 2  •  hydrOXYzine (ATARAX) 25 MG tablet, Take 25 mg by mouth 3 (Three) Times a Day As Needed for Itching., Disp: , Rfl:   •  Insulin Regular Human, Conc, (HUMULIN R U-500 KWIKPEN) 500 UNIT/ML solution pen-injector CONCENTRATED injection, 60 units with each meal 32 units with snacks while on 60mg prednisone, Disp: 5 pen, Rfl: 3  •  levETIRAcetam (KEPPRA) 500 MG tablet, TAKE ONE TABLET BY MOUTH TWICE A DAY, Disp: 60 tablet, Rfl: 0  •  LYRICA 75 MG capsule, TAKE ONE CAPSULE BY MOUTH TWICE A DAY, Disp: 60 capsule, Rfl: 0  •  Multiple Vitamin (MULTI-VITAMIN PO), Take 1 tablet by mouth Daily., Disp: , Rfl:   •  OxyCODONE HCl (OXAYDO) 7.5 MG tablet , Take 7.5 mg by mouth Every 8 (Eight) Hours As Needed., Disp: , Rfl:   •  pantoprazole (PROTONIX) 40 MG EC tablet, TAKE ONE TABLET BY MOUTH DAILY, Disp: 30 tablet, Rfl: 4  •  promethazine (PHENERGAN) 25 MG tablet, TAKE ONE TABLET BY MOUTH EVERY 6 HOURS AS NEEDED FOR NAUSEA OR VOMITING, Disp: 30 tablet, Rfl: 0  •  RELISTOR 150 MG tablet, 450 mg Daily., Disp: , Rfl:   •  spironolactone (ALDACTONE) 100 MG tablet, TAKE ONE TABLET BY MOUTH DAILY, Disp: 30 tablet, Rfl: 4  •  sucralfate (CARAFATE) 1 g tablet, Take 1 tablet by mouth 2 (Two) Times a Day., Disp: 60 tablet, Rfl: 2  •  trimethobenzamide (TIGAN) 300 MG capsule, Take 300 mg by mouth 3 (Three) Times a Day., Disp: , Rfl:   •  vitamin B-12 (CYANOCOBALAMIN) 1000 MCG tablet, Take 1,000 mcg by mouth daily., Disp: , Rfl:   •  XIFAXAN 550 MG tablet, TAKE  "ONE TABLET BY MOUTH TWICE A DAY, Disp: 60 tablet, Rfl: 4  No current facility-administered medications for this visit.         Objective     /66   Ht 168.5 cm (66.34\")   Wt 82.1 kg (181 lb)   BMI 28.92 kg/m²     Physical Exam   Constitutional: She is oriented to person, place, and time. She appears well-developed and well-nourished. No distress.   Cardiovascular: Normal rate and regular rhythm.    Pulmonary/Chest: Effort normal and breath sounds normal.   Neurological: She is alert and oriented to person, place, and time.   Skin: Skin is warm and dry.   Psychiatric: Her mood appears anxious. She exhibits a depressed mood.   Nursing note and vitals reviewed.        Assessment/Plan   Silvia was seen today for irritable and balance issues.    Diagnoses and all orders for this visit:    Cirrhosis of liver without ascites, unspecified hepatic cirrhosis type (CMS/HCC)  -     Ammonia    Anxiety and depression      We discussed her current symptoms.  Ammonia level is pending.  If this is normal, advised her we would need to treat this as worsening anxiety and depression.         "

## 2018-07-11 DIAGNOSIS — F41.9 ANXIETY: ICD-10-CM

## 2018-07-11 RX ORDER — ALPRAZOLAM 0.5 MG/1
TABLET ORAL
Qty: 60 TABLET | Refills: 1 | Status: SHIPPED | OUTPATIENT
Start: 2018-07-11 | End: 2018-10-17 | Stop reason: SDUPTHER

## 2018-07-12 ENCOUNTER — TELEPHONE (OUTPATIENT)
Dept: INTERNAL MEDICINE | Facility: CLINIC | Age: 58
End: 2018-07-12

## 2018-07-12 RX ORDER — PROMETHAZINE HYDROCHLORIDE 25 MG/1
TABLET ORAL
Qty: 30 TABLET | Refills: 0 | Status: SHIPPED | OUTPATIENT
Start: 2018-07-12 | End: 2018-12-13

## 2018-07-12 NOTE — TELEPHONE ENCOUNTER
I can't advise her on what to do. She is already taking medication for this.  I recommend she check with the gastroenterology office to see if there is a GI doctor taking his calls while his is out of town.

## 2018-07-12 NOTE — TELEPHONE ENCOUNTER
----- Message from Blanca Pitts MD sent at 7/12/2018  6:55 AM EDT -----  Please let her know her ammonia level is high.  I recommend she discuss this with her gastroenterologist.

## 2018-07-12 NOTE — TELEPHONE ENCOUNTER
Patient states he is out of town and that's why she came here. Also she had a rough night dizziness, itchiness and a nose bleed

## 2018-07-16 ENCOUNTER — INFUSION (OUTPATIENT)
Dept: ONCOLOGY | Facility: HOSPITAL | Age: 58
End: 2018-07-16

## 2018-07-16 ENCOUNTER — OFFICE VISIT (OUTPATIENT)
Dept: ONCOLOGY | Facility: CLINIC | Age: 58
End: 2018-07-16

## 2018-07-16 VITALS — DIASTOLIC BLOOD PRESSURE: 75 MMHG | SYSTOLIC BLOOD PRESSURE: 123 MMHG | HEART RATE: 98 BPM

## 2018-07-16 VITALS
DIASTOLIC BLOOD PRESSURE: 72 MMHG | RESPIRATION RATE: 16 BRPM | BODY MASS INDEX: 29.92 KG/M2 | TEMPERATURE: 99.5 F | HEIGHT: 66 IN | WEIGHT: 186.2 LBS | SYSTOLIC BLOOD PRESSURE: 122 MMHG | HEART RATE: 102 BPM | OXYGEN SATURATION: 100 %

## 2018-07-16 DIAGNOSIS — K74.69 OTHER CIRRHOSIS OF LIVER (HCC): ICD-10-CM

## 2018-07-16 DIAGNOSIS — D69.3 ACUTE ITP (HCC): Primary | ICD-10-CM

## 2018-07-16 DIAGNOSIS — M32.9 SYSTEMIC LUPUS ERYTHEMATOSUS, UNSPECIFIED SLE TYPE, UNSPECIFIED ORGAN INVOLVEMENT STATUS (HCC): ICD-10-CM

## 2018-07-16 DIAGNOSIS — D61.818 PANCYTOPENIA (HCC): ICD-10-CM

## 2018-07-16 DIAGNOSIS — D73.1 HYPERSPLENISM: ICD-10-CM

## 2018-07-16 DIAGNOSIS — M06.9 RHEUMATOID ARTHRITIS, INVOLVING UNSPECIFIED SITE, UNSPECIFIED RHEUMATOID FACTOR PRESENCE: ICD-10-CM

## 2018-07-16 DIAGNOSIS — D69.3 ACUTE ITP (HCC): ICD-10-CM

## 2018-07-16 LAB
BASOPHILS # BLD AUTO: 0.04 10*3/MM3 (ref 0–0.1)
BASOPHILS NFR BLD AUTO: 1.3 % (ref 0–1.1)
DEPRECATED RDW RBC AUTO: 53.3 FL (ref 37–49)
EOSINOPHIL # BLD AUTO: 0.1 10*3/MM3 (ref 0–0.36)
EOSINOPHIL NFR BLD AUTO: 3.3 % (ref 1–5)
ERYTHROCYTE [DISTWIDTH] IN BLOOD BY AUTOMATED COUNT: 15.8 % (ref 11.7–14.5)
HCT VFR BLD AUTO: 32.8 % (ref 34–45)
HGB BLD-MCNC: 10.5 G/DL (ref 11.5–14.9)
IMM GRANULOCYTES # BLD: 0.04 10*3/MM3 (ref 0–0.03)
IMM GRANULOCYTES NFR BLD: 1.3 % (ref 0–0.5)
LYMPHOCYTES # BLD AUTO: 0.57 10*3/MM3 (ref 1–3.5)
LYMPHOCYTES NFR BLD AUTO: 19.1 % (ref 20–49)
MCH RBC QN AUTO: 29.4 PG (ref 27–33)
MCHC RBC AUTO-ENTMCNC: 32 G/DL (ref 32–35)
MCV RBC AUTO: 91.9 FL (ref 83–97)
MONOCYTES # BLD AUTO: 0.36 10*3/MM3 (ref 0.25–0.8)
MONOCYTES NFR BLD AUTO: 12 % (ref 4–12)
NEUTROPHILS # BLD AUTO: 1.88 10*3/MM3 (ref 1.5–7)
NEUTROPHILS NFR BLD AUTO: 63 % (ref 39–75)
NRBC BLD MANUAL-RTO: 0 /100 WBC (ref 0–0)
PLATELET # BLD AUTO: 106 10*3/MM3 (ref 150–375)
PMV BLD AUTO: 10.5 FL (ref 8.9–12.1)
RBC # BLD AUTO: 3.57 10*6/MM3 (ref 3.9–5)
WBC NRBC COR # BLD: 2.99 10*3/MM3 (ref 4–10)

## 2018-07-16 PROCEDURE — 85025 COMPLETE CBC W/AUTO DIFF WBC: CPT

## 2018-07-16 PROCEDURE — 25010000002 RITUXIMAB 10 MG/ML SOLUTION 50 ML VIAL: Performed by: INTERNAL MEDICINE

## 2018-07-16 PROCEDURE — 25010000002 DIPHENHYDRAMINE PER 50 MG: Performed by: INTERNAL MEDICINE

## 2018-07-16 PROCEDURE — 99214 OFFICE O/P EST MOD 30 MIN: CPT | Performed by: INTERNAL MEDICINE

## 2018-07-16 PROCEDURE — 25010000002 RITUXIMAB 10 MG/ML SOLUTION 10 ML VIAL: Performed by: INTERNAL MEDICINE

## 2018-07-16 RX ORDER — SODIUM CHLORIDE 9 MG/ML
250 INJECTION, SOLUTION INTRAVENOUS ONCE
Status: COMPLETED | OUTPATIENT
Start: 2018-07-16 | End: 2018-07-16

## 2018-07-16 RX ORDER — ACETAMINOPHEN 325 MG/1
650 TABLET ORAL ONCE
Status: COMPLETED | OUTPATIENT
Start: 2018-07-16 | End: 2018-07-16

## 2018-07-16 RX ORDER — FAMOTIDINE 10 MG/ML
20 INJECTION, SOLUTION INTRAVENOUS ONCE
Status: COMPLETED | OUTPATIENT
Start: 2018-07-16 | End: 2018-07-16

## 2018-07-16 RX ORDER — FERROUS SULFATE 325(65) MG
TABLET ORAL
Qty: 60 TABLET | Refills: 3 | Status: SHIPPED | OUTPATIENT
Start: 2018-07-16 | End: 2018-10-11 | Stop reason: HOSPADM

## 2018-07-16 RX ADMIN — SODIUM CHLORIDE 250 ML: 9 INJECTION, SOLUTION INTRAVENOUS at 09:41

## 2018-07-16 RX ADMIN — RITUXIMAB 700 MG: 10 INJECTION, SOLUTION INTRAVENOUS at 10:26

## 2018-07-16 RX ADMIN — FAMOTIDINE 20 MG: 10 INJECTION, SOLUTION INTRAVENOUS at 09:41

## 2018-07-16 RX ADMIN — DIPHENHYDRAMINE HYDROCHLORIDE 25 MG: 50 INJECTION, SOLUTION INTRAMUSCULAR; INTRAVENOUS at 09:41

## 2018-07-16 RX ADMIN — ACETAMINOPHEN 650 MG: 325 TABLET, FILM COATED ORAL at 09:41

## 2018-07-16 NOTE — PROGRESS NOTES
REASON FOR FOLLOWUP/CHIEF COMPLAINT:  ITP  1. 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   2. Evidence of B12 deficiency on her initial lab evaluation in April 2013.  Patient was started on parenteral B12 replacement.  The patient was switched from shots to daily oral B12 replacement after the visit of 12/02/2013.    3. Hemoglobin has significantly improved since banding of her esophageal varices and increase in her oral iron supplement to twice daily dosing.  4. Morphologically normal bone marrow from 08/20/2013 with normal flow cytometry.  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  Significance of this is uncertain.    5. Mixed connective tissue disorder, currently on Enbrel therapy.  6. Insulin-requiring diabetes mellitus.  7. Patient continues to follow-up periodically with the transplant team at [facility] but currently she is not on the transplant list.  8. 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 Rituxan therapy weekly ×4 completed 7/16/2018     HISTORY OF PRESENT ILLNESS:  Silvia is a 57 y.o. 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  She has had GI bleeding in the past due to portal hypertension.      She comes in today for follow-up on her ITP receiving Rituxan weekly ×4.  She is due today for cycle #4 Rituxan.  Her platelet count continues to improve and is now up to 106,000.  She has tolerated her Rituxan treatments well.    For now, we will leave her on 10 mg of prednisone daily but we encouraged her to make an appointment with her rheumatologist once she completes the Rituxan therapy.       Past Medical History, Past Surgical History, Social History, Family History have been reviewed and are without significant changes except as mentioned.    Review of Systems   Review of Systems   Constitutional:  "Positive for fatigue and fever. Negative for activity change.   HENT: Negative for mouth sores, nosebleeds and trouble swallowing.    Respiratory: Negative for cough, shortness of breath and wheezing.    Cardiovascular: Negative for chest pain and palpitations.   Gastrointestinal: Positive for constipation. Negative for diarrhea and nausea.   Genitourinary: Negative for dysuria and hematuria.   Musculoskeletal: Negative for arthralgias and myalgias.   Skin: Negative for rash and wound.   Neurological: Negative for seizures and syncope.   Hematological: Negative for adenopathy. Bruises/bleeds easily.   Psychiatric/Behavioral: Negative for confusion.       Medications:  The current medication list was reviewed in the EMR    ALLERGIES:    Allergies   Allergen Reactions   • Albuterol Anaphylaxis   • Lactulose Nausea And Vomiting and Other (See Comments)     Severe abdominal pain   • Tramadol Other (See Comments)     Per pt causes her \"palsy, meaning shaking and tremors\" and gi upset, nausea   • Quinine Derivatives Nausea And Vomiting              Vitals:    07/16/18 0855   BP: 122/72   Pulse: 102   Resp: 16   Temp: 99.5 °F (37.5 °C)   SpO2: 100%  Comment: at rest   Weight: 84.5 kg (186 lb 3.2 oz)   Height: 168.5 cm (66.34\")   PainSc: 7  Comment: all over     Physical Exam    CONSTITUTIONAL:  Vital signs reviewed.  No distress, looks comfortable.  EYES:  Conjunctiva and lids unremarkable.  PERRLA  EARS,NOSE,MOUTH,THROAT:  Ears and nose appear unremarkable.  Lips, teeth, gums appear unremarkable.  RESPIRATORY:  Normal respiratory effort.  Lungs clear to auscultation bilaterally.  CARDIOVASCULAR:  Normal S1, S2.  No murmurs rubs or gallops.  No significant lower extremity edema.  GASTROINTESTINAL: Abdomen appears unremarkable.  Nontender.  No hepatomegaly.  No splenomegaly.  MUSCULOSKELETAL:  Unremarkable digits/nails.  No cyanosis or clubbing.  SKIN:  Warm.  No rashes.  PSYCHIATRIC:  Normal judgment and insight.  Normal " mood and affect.       RECENT LABS:  WBC   Date Value Ref Range Status   07/16/2018 2.99 (L) 4.00 - 10.00 10*3/mm3 Final     Hemoglobin   Date Value Ref Range Status   07/16/2018 10.5 (L) 11.5 - 14.9 g/dL Final     Platelets   Date Value Ref Range Status   07/16/2018 106 (L) 150 - 375 10*3/mm3 Final     Lab Results   Component Value Date    NEUTROABS 1.88 07/16/2018                 ASSESSMENT/PLAN:     *ITP.  Appears to be most consistent with ITP.  Peripheral smear shows no significant amount of schistocytes and no platelet clumping.  IPF elevated at 18.4%, consistent with a destructive etiology of thrombocytopenia.  Unremarkable: Folate, B12, fibrinogen, PTT.  Did not respond to platelet transfusion, also consistent with a destructive process.  She received 5 daily doses of IVIG in the hospital and was discharged on prednisone 60 mg daily.  Prednisone was decreased to 10 mg daily her last office visit and Plan to continue her on this dose until she follows up with her rheumatologist.    *Mild elevation in INR at 1.26.  Suspect this is due to cirrhosis.     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  WBC has declined to 2.98 today.  Platelet count is stable at 89,000, was at 86,000 last week.     *B12 deficiency.  On oral B12 since December 2013.     *History of esophageal varices banding.     *Iron deficiency anemia due to GI bleeding.  Has been on oral iron twice per day.  Ferritin 51 with 10% saturation in the hospital.  She received 2 doses of IV Venofer prior to discharge.  Ferritin iron panel were performed today, within normal limits.    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  This is of unknown significance.     *Rheumatoid arthritis.  Reports she hasn't been unable to take Enbrel since December due to persistent  fevers.     *Lupus     *Diabetes.  Hyperglycemia from steroids may be a problem.  Defer to hospitalist.     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  She currently is having some issues with hepatic encephalopathy.        Plan  · Continue prednisone to 10 mg daily.  · 4th dose of IV Rituxan today  · Lab + RN review 2, 4, 6 wks  · MD appt + Lab 8wks.

## 2018-07-27 ENCOUNTER — RESULTS ENCOUNTER (OUTPATIENT)
Dept: ONCOLOGY | Facility: CLINIC | Age: 58
End: 2018-07-27

## 2018-07-27 DIAGNOSIS — M32.9 SYSTEMIC LUPUS ERYTHEMATOSUS, UNSPECIFIED SLE TYPE, UNSPECIFIED ORGAN INVOLVEMENT STATUS (HCC): ICD-10-CM

## 2018-07-27 DIAGNOSIS — M06.9 RHEUMATOID ARTHRITIS, INVOLVING UNSPECIFIED SITE, UNSPECIFIED RHEUMATOID FACTOR PRESENCE: ICD-10-CM

## 2018-07-27 DIAGNOSIS — D61.818 PANCYTOPENIA (HCC): ICD-10-CM

## 2018-07-27 DIAGNOSIS — K74.69 OTHER CIRRHOSIS OF LIVER (HCC): ICD-10-CM

## 2018-07-27 DIAGNOSIS — D73.1 HYPERSPLENISM: ICD-10-CM

## 2018-07-27 DIAGNOSIS — D69.3 ACUTE ITP (HCC): ICD-10-CM

## 2018-07-31 ENCOUNTER — CLINICAL SUPPORT (OUTPATIENT)
Dept: ONCOLOGY | Facility: HOSPITAL | Age: 58
End: 2018-07-31

## 2018-07-31 ENCOUNTER — LAB (OUTPATIENT)
Dept: LAB | Facility: HOSPITAL | Age: 58
End: 2018-07-31

## 2018-07-31 DIAGNOSIS — D73.1 HYPERSPLENISM: ICD-10-CM

## 2018-07-31 DIAGNOSIS — K74.69 OTHER CIRRHOSIS OF LIVER (HCC): ICD-10-CM

## 2018-07-31 DIAGNOSIS — D61.818 PANCYTOPENIA (HCC): ICD-10-CM

## 2018-07-31 DIAGNOSIS — M06.9 RHEUMATOID ARTHRITIS, INVOLVING UNSPECIFIED SITE, UNSPECIFIED RHEUMATOID FACTOR PRESENCE: ICD-10-CM

## 2018-07-31 DIAGNOSIS — D69.3 ACUTE ITP (HCC): ICD-10-CM

## 2018-07-31 DIAGNOSIS — M32.9 SYSTEMIC LUPUS ERYTHEMATOSUS, UNSPECIFIED SLE TYPE, UNSPECIFIED ORGAN INVOLVEMENT STATUS (HCC): ICD-10-CM

## 2018-07-31 LAB
BASOPHILS # BLD AUTO: 0.05 10*3/MM3 (ref 0–0.1)
BASOPHILS NFR BLD AUTO: 1.2 % (ref 0–1.1)
DEPRECATED RDW RBC AUTO: 44 FL (ref 37–49)
EOSINOPHIL # BLD AUTO: 0.17 10*3/MM3 (ref 0–0.36)
EOSINOPHIL NFR BLD AUTO: 4 % (ref 1–5)
ERYTHROCYTE [DISTWIDTH] IN BLOOD BY AUTOMATED COUNT: 14.4 % (ref 11.7–14.5)
HCT VFR BLD AUTO: 34.2 % (ref 34–45)
HGB BLD-MCNC: 11.9 G/DL (ref 11.5–14.9)
IMM GRANULOCYTES # BLD: 0.02 10*3/MM3 (ref 0–0.03)
IMM GRANULOCYTES NFR BLD: 0.5 % (ref 0–0.5)
LYMPHOCYTES # BLD AUTO: 0.92 10*3/MM3 (ref 1–3.5)
LYMPHOCYTES NFR BLD AUTO: 21.6 % (ref 20–49)
MCH RBC QN AUTO: 29 PG (ref 27–33)
MCHC RBC AUTO-ENTMCNC: 34.8 G/DL (ref 32–35)
MCV RBC AUTO: 83.4 FL (ref 83–97)
MONOCYTES # BLD AUTO: 0.57 10*3/MM3 (ref 0.25–0.8)
MONOCYTES NFR BLD AUTO: 13.4 % (ref 4–12)
NEUTROPHILS # BLD AUTO: 2.53 10*3/MM3 (ref 1.5–7)
NEUTROPHILS NFR BLD AUTO: 59.3 % (ref 39–75)
NRBC BLD MANUAL-RTO: 0 /100 WBC (ref 0–0)
PLATELET # BLD AUTO: 115 10*3/MM3 (ref 150–375)
PMV BLD AUTO: 9.9 FL (ref 8.9–12.1)
RBC # BLD AUTO: 4.1 10*6/MM3 (ref 3.9–5)
WBC NRBC COR # BLD: 4.26 10*3/MM3 (ref 4–10)

## 2018-07-31 PROCEDURE — 36415 COLL VENOUS BLD VENIPUNCTURE: CPT | Performed by: INTERNAL MEDICINE

## 2018-07-31 PROCEDURE — 85025 COMPLETE CBC W/AUTO DIFF WBC: CPT | Performed by: INTERNAL MEDICINE

## 2018-07-31 RX ORDER — PREDNISONE 10 MG/1
10 TABLET ORAL DAILY
Qty: 90 TABLET | Refills: 3 | Status: SHIPPED | OUTPATIENT
Start: 2018-07-31 | End: 2018-12-03 | Stop reason: ALTCHOICE

## 2018-07-31 NOTE — PROGRESS NOTES
Pt presents for RN review. Pt has no C/O. Platelets have increased to 114K. All other labs are stable for this pt. She has not taken her prednisone in 4-5 days because she ran out.. New script sent to Pt's pharmacy per Dr. Mcdowell. Copy of labs given and pt V/U.   Lab Results   Component Value Date    WBC 4.26 07/31/2018    HGB 11.9 07/31/2018    HCT 34.2 07/31/2018    MCV 83.4 07/31/2018     (L) 07/31/2018

## 2018-08-08 RX ORDER — LEVETIRACETAM 500 MG/1
TABLET ORAL
Qty: 60 TABLET | Refills: 0 | OUTPATIENT
Start: 2018-08-08 | End: 2018-09-05 | Stop reason: SDUPTHER

## 2018-08-09 DIAGNOSIS — E11.42 DIABETIC PERIPHERAL NEUROPATHY (HCC): ICD-10-CM

## 2018-08-09 DIAGNOSIS — K31.84 GASTROPARESIS: ICD-10-CM

## 2018-08-09 DIAGNOSIS — IMO0002 TYPE 2 DIABETES MELLITUS, UNCONTROLLED, WITH NEUROPATHY: Primary | ICD-10-CM

## 2018-08-09 DIAGNOSIS — E11.10 DIABETIC KETOACIDOSIS WITHOUT COMA ASSOCIATED WITH TYPE 2 DIABETES MELLITUS (HCC): ICD-10-CM

## 2018-08-09 LAB
BUN SERPL-MCNC: 4 MG/DL (ref 6–20)
BUN/CREAT SERPL: 4.8 (ref 7–25)
CALCIUM SERPL-MCNC: 9.1 MG/DL (ref 8.6–10.5)
CHLORIDE SERPL-SCNC: 100 MMOL/L (ref 98–107)
CHOLEST SERPL-MCNC: 201 MG/DL (ref 0–200)
CO2 SERPL-SCNC: 26.1 MMOL/L (ref 22–29)
CREAT SERPL-MCNC: 0.83 MG/DL (ref 0.57–1)
GLUCOSE SERPL-MCNC: 264 MG/DL (ref 65–99)
HDLC SERPL-MCNC: 74 MG/DL (ref 40–60)
INTERPRETATION: NORMAL
LDLC SERPL CALC-MCNC: 106 MG/DL (ref 0–100)
Lab: NORMAL
POTASSIUM SERPL-SCNC: 4.2 MMOL/L (ref 3.5–5.2)
SODIUM SERPL-SCNC: 139 MMOL/L (ref 136–145)
TRIGL SERPL-MCNC: 105 MG/DL (ref 0–150)
VLDLC SERPL CALC-MCNC: 21 MG/DL (ref 5–40)

## 2018-08-10 ENCOUNTER — RESULTS ENCOUNTER (OUTPATIENT)
Dept: ONCOLOGY | Facility: CLINIC | Age: 58
End: 2018-08-10

## 2018-08-10 DIAGNOSIS — D61.818 PANCYTOPENIA (HCC): ICD-10-CM

## 2018-08-10 DIAGNOSIS — D69.3 ACUTE ITP (HCC): ICD-10-CM

## 2018-08-10 DIAGNOSIS — M32.9 SYSTEMIC LUPUS ERYTHEMATOSUS, UNSPECIFIED SLE TYPE, UNSPECIFIED ORGAN INVOLVEMENT STATUS (HCC): ICD-10-CM

## 2018-08-10 DIAGNOSIS — K74.69 OTHER CIRRHOSIS OF LIVER (HCC): ICD-10-CM

## 2018-08-10 DIAGNOSIS — M06.9 RHEUMATOID ARTHRITIS, INVOLVING UNSPECIFIED SITE, UNSPECIFIED RHEUMATOID FACTOR PRESENCE: ICD-10-CM

## 2018-08-10 DIAGNOSIS — D73.1 HYPERSPLENISM: ICD-10-CM

## 2018-08-10 RX ORDER — SUCRALFATE 1 G/1
TABLET ORAL
Qty: 60 TABLET | Refills: 1 | Status: SHIPPED | OUTPATIENT
Start: 2018-08-10 | End: 2018-10-17 | Stop reason: SDUPTHER

## 2018-08-14 ENCOUNTER — APPOINTMENT (OUTPATIENT)
Dept: ONCOLOGY | Facility: HOSPITAL | Age: 58
End: 2018-08-14

## 2018-08-14 ENCOUNTER — APPOINTMENT (OUTPATIENT)
Dept: LAB | Facility: HOSPITAL | Age: 58
End: 2018-08-14

## 2018-08-14 ENCOUNTER — RESULTS ENCOUNTER (OUTPATIENT)
Dept: ENDOCRINOLOGY | Age: 58
End: 2018-08-14

## 2018-08-14 DIAGNOSIS — IMO0002 TYPE 2 DIABETES MELLITUS, UNCONTROLLED, WITH NEUROPATHY: ICD-10-CM

## 2018-08-14 DIAGNOSIS — E11.42 DIABETIC PERIPHERAL NEUROPATHY (HCC): ICD-10-CM

## 2018-08-14 DIAGNOSIS — K31.84 GASTROPARESIS: ICD-10-CM

## 2018-08-14 DIAGNOSIS — E11.10 DIABETIC KETOACIDOSIS WITHOUT COMA ASSOCIATED WITH TYPE 2 DIABETES MELLITUS (HCC): ICD-10-CM

## 2018-08-14 RX ORDER — FOLIC ACID 1 MG/1
TABLET ORAL
Qty: 30 TABLET | Refills: 3 | Status: SHIPPED | OUTPATIENT
Start: 2018-08-14 | End: 2018-12-13

## 2018-08-23 ENCOUNTER — TELEPHONE (OUTPATIENT)
Dept: ENDOCRINOLOGY | Age: 58
End: 2018-08-23

## 2018-08-23 NOTE — TELEPHONE ENCOUNTER
----- Message from Amanda Lara sent at 8/22/2018  4:25 PM EDT -----  Contact: PATIENT  PATIENT CALLED TO SEE IF SHE COULD GET AN APPT WITH THE DOCTOR SOON. PATIENT HAD AN APPT SCHEDULE ON 8-27 FOR SOME REASON COULDN'T MAKE IT.

## 2018-08-24 ENCOUNTER — RESULTS ENCOUNTER (OUTPATIENT)
Dept: ONCOLOGY | Facility: CLINIC | Age: 58
End: 2018-08-24

## 2018-08-24 DIAGNOSIS — D61.818 PANCYTOPENIA (HCC): ICD-10-CM

## 2018-08-24 DIAGNOSIS — M06.9 RHEUMATOID ARTHRITIS, INVOLVING UNSPECIFIED SITE, UNSPECIFIED RHEUMATOID FACTOR PRESENCE: ICD-10-CM

## 2018-08-24 DIAGNOSIS — K74.69 OTHER CIRRHOSIS OF LIVER (HCC): ICD-10-CM

## 2018-08-24 DIAGNOSIS — M32.9 SYSTEMIC LUPUS ERYTHEMATOSUS, UNSPECIFIED SLE TYPE, UNSPECIFIED ORGAN INVOLVEMENT STATUS (HCC): ICD-10-CM

## 2018-08-24 DIAGNOSIS — D69.3 ACUTE ITP (HCC): ICD-10-CM

## 2018-08-24 DIAGNOSIS — D73.1 HYPERSPLENISM: ICD-10-CM

## 2018-08-27 ENCOUNTER — APPOINTMENT (OUTPATIENT)
Dept: LAB | Facility: HOSPITAL | Age: 58
End: 2018-08-27

## 2018-08-27 ENCOUNTER — APPOINTMENT (OUTPATIENT)
Dept: ONCOLOGY | Facility: HOSPITAL | Age: 58
End: 2018-08-27

## 2018-08-29 ENCOUNTER — APPOINTMENT (OUTPATIENT)
Dept: ONCOLOGY | Facility: HOSPITAL | Age: 58
End: 2018-08-29

## 2018-08-29 ENCOUNTER — APPOINTMENT (OUTPATIENT)
Dept: LAB | Facility: HOSPITAL | Age: 58
End: 2018-08-29

## 2018-09-05 RX ORDER — INSULIN HUMAN 500 [IU]/ML
INJECTION, SOLUTION SUBCUTANEOUS
Qty: 6 PEN | Refills: 11 | Status: SHIPPED | OUTPATIENT
Start: 2018-09-05 | End: 2018-09-20 | Stop reason: SDUPTHER

## 2018-09-06 RX ORDER — LEVETIRACETAM 500 MG/1
TABLET ORAL
Qty: 60 TABLET | Refills: 0 | Status: ON HOLD | OUTPATIENT
Start: 2018-09-06 | End: 2018-10-04 | Stop reason: SDUPTHER

## 2018-09-07 ENCOUNTER — RESULTS ENCOUNTER (OUTPATIENT)
Dept: ONCOLOGY | Facility: CLINIC | Age: 58
End: 2018-09-07

## 2018-09-07 DIAGNOSIS — M32.9 SYSTEMIC LUPUS ERYTHEMATOSUS, UNSPECIFIED SLE TYPE, UNSPECIFIED ORGAN INVOLVEMENT STATUS (HCC): ICD-10-CM

## 2018-09-07 DIAGNOSIS — K74.69 OTHER CIRRHOSIS OF LIVER (HCC): ICD-10-CM

## 2018-09-07 DIAGNOSIS — D69.3 ACUTE ITP (HCC): ICD-10-CM

## 2018-09-07 DIAGNOSIS — M06.9 RHEUMATOID ARTHRITIS, INVOLVING UNSPECIFIED SITE, UNSPECIFIED RHEUMATOID FACTOR PRESENCE: ICD-10-CM

## 2018-09-07 DIAGNOSIS — D73.1 HYPERSPLENISM: ICD-10-CM

## 2018-09-07 DIAGNOSIS — D61.818 PANCYTOPENIA (HCC): ICD-10-CM

## 2018-09-10 ENCOUNTER — LAB (OUTPATIENT)
Dept: LAB | Facility: HOSPITAL | Age: 58
End: 2018-09-10

## 2018-09-10 ENCOUNTER — OFFICE VISIT (OUTPATIENT)
Dept: ONCOLOGY | Facility: CLINIC | Age: 58
End: 2018-09-10

## 2018-09-10 VITALS
HEART RATE: 104 BPM | DIASTOLIC BLOOD PRESSURE: 72 MMHG | BODY MASS INDEX: 29.83 KG/M2 | WEIGHT: 185.6 LBS | RESPIRATION RATE: 16 BRPM | SYSTOLIC BLOOD PRESSURE: 122 MMHG | HEIGHT: 66 IN | TEMPERATURE: 98 F | OXYGEN SATURATION: 97 %

## 2018-09-10 DIAGNOSIS — K74.60 CIRRHOSIS OF LIVER WITHOUT ASCITES, UNSPECIFIED HEPATIC CIRRHOSIS TYPE (HCC): ICD-10-CM

## 2018-09-10 DIAGNOSIS — D73.1 HYPERSPLENISM: ICD-10-CM

## 2018-09-10 DIAGNOSIS — D69.3 ACUTE ITP (HCC): Primary | ICD-10-CM

## 2018-09-10 DIAGNOSIS — D61.818 PANCYTOPENIA (HCC): ICD-10-CM

## 2018-09-10 DIAGNOSIS — K74.69 OTHER CIRRHOSIS OF LIVER (HCC): ICD-10-CM

## 2018-09-10 DIAGNOSIS — M06.9 RHEUMATOID ARTHRITIS, INVOLVING UNSPECIFIED SITE, UNSPECIFIED RHEUMATOID FACTOR PRESENCE: ICD-10-CM

## 2018-09-10 DIAGNOSIS — D69.3 ACUTE ITP (HCC): ICD-10-CM

## 2018-09-10 DIAGNOSIS — M32.9 SYSTEMIC LUPUS ERYTHEMATOSUS, UNSPECIFIED SLE TYPE, UNSPECIFIED ORGAN INVOLVEMENT STATUS (HCC): ICD-10-CM

## 2018-09-10 LAB
ALBUMIN SERPL-MCNC: 3.4 G/DL (ref 3.5–5.2)
ALBUMIN/GLOB SERPL: 1.2 G/DL (ref 1.1–2.4)
ALP SERPL-CCNC: 151 U/L (ref 38–116)
ALT SERPL W P-5'-P-CCNC: 29 U/L (ref 0–33)
ANION GAP SERPL CALCULATED.3IONS-SCNC: 12 MMOL/L
AST SERPL-CCNC: 37 U/L (ref 0–32)
BASOPHILS # BLD AUTO: 0.02 10*3/MM3 (ref 0–0.1)
BASOPHILS NFR BLD AUTO: 0.8 % (ref 0–1.1)
BILIRUB SERPL-MCNC: 0.5 MG/DL (ref 0.1–1.2)
BUN BLD-MCNC: 8 MG/DL (ref 6–20)
BUN/CREAT SERPL: 11.1 (ref 7.3–30)
CALCIUM SPEC-SCNC: 8.9 MG/DL (ref 8.5–10.2)
CHLORIDE SERPL-SCNC: 105 MMOL/L (ref 98–107)
CO2 SERPL-SCNC: 23 MMOL/L (ref 22–29)
CREAT BLD-MCNC: 0.72 MG/DL (ref 0.6–1.1)
DEPRECATED RDW RBC AUTO: 50.6 FL (ref 37–49)
EOSINOPHIL # BLD AUTO: 0.07 10*3/MM3 (ref 0–0.36)
EOSINOPHIL NFR BLD AUTO: 2.7 % (ref 1–5)
ERYTHROCYTE [DISTWIDTH] IN BLOOD BY AUTOMATED COUNT: 15.9 % (ref 11.7–14.5)
GFR SERPL CREATININE-BSD FRML MDRD: 84 ML/MIN/1.73
GLOBULIN UR ELPH-MCNC: 2.9 GM/DL (ref 1.8–3.5)
GLUCOSE BLD-MCNC: 157 MG/DL (ref 74–124)
HCT VFR BLD AUTO: 32 % (ref 34–45)
HGB BLD-MCNC: 10.4 G/DL (ref 11.5–14.9)
IMM GRANULOCYTES # BLD: 0.03 10*3/MM3 (ref 0–0.03)
IMM GRANULOCYTES NFR BLD: 1.2 % (ref 0–0.5)
LYMPHOCYTES # BLD AUTO: 0.48 10*3/MM3 (ref 1–3.5)
LYMPHOCYTES NFR BLD AUTO: 18.5 % (ref 20–49)
MCH RBC QN AUTO: 28.5 PG (ref 27–33)
MCHC RBC AUTO-ENTMCNC: 32.5 G/DL (ref 32–35)
MCV RBC AUTO: 87.7 FL (ref 83–97)
MONOCYTES # BLD AUTO: 0.36 10*3/MM3 (ref 0.25–0.8)
MONOCYTES NFR BLD AUTO: 13.8 % (ref 4–12)
NEUTROPHILS # BLD AUTO: 1.64 10*3/MM3 (ref 1.5–7)
NEUTROPHILS NFR BLD AUTO: 63 % (ref 39–75)
NRBC BLD MANUAL-RTO: 0 /100 WBC (ref 0–0)
PLATELET # BLD AUTO: 90 10*3/MM3 (ref 150–375)
PLATELETS.RETICULATED NFR BLD AUTO: 5.3 % (ref 1.1–6.1)
PMV BLD AUTO: 9.9 FL (ref 8.9–12.1)
POTASSIUM BLD-SCNC: 3.7 MMOL/L (ref 3.5–4.7)
PROT SERPL-MCNC: 6.3 G/DL (ref 6.3–8)
RBC # BLD AUTO: 3.65 10*6/MM3 (ref 3.9–5)
SODIUM BLD-SCNC: 140 MMOL/L (ref 134–145)
VIT B12 BLD-MCNC: 767 PG/ML (ref 211–946)
WBC NRBC COR # BLD: 2.6 10*3/MM3 (ref 4–10)

## 2018-09-10 PROCEDURE — 36415 COLL VENOUS BLD VENIPUNCTURE: CPT | Performed by: INTERNAL MEDICINE

## 2018-09-10 PROCEDURE — 85055 RETICULATED PLATELET ASSAY: CPT

## 2018-09-10 PROCEDURE — 99214 OFFICE O/P EST MOD 30 MIN: CPT | Performed by: INTERNAL MEDICINE

## 2018-09-10 PROCEDURE — 82607 VITAMIN B-12: CPT | Performed by: INTERNAL MEDICINE

## 2018-09-10 PROCEDURE — 85025 COMPLETE CBC W/AUTO DIFF WBC: CPT

## 2018-09-10 PROCEDURE — 80053 COMPREHEN METABOLIC PANEL: CPT

## 2018-09-17 RX ORDER — PANTOPRAZOLE SODIUM 40 MG/1
TABLET, DELAYED RELEASE ORAL
Qty: 30 TABLET | Refills: 3 | Status: SHIPPED | OUTPATIENT
Start: 2018-09-17 | End: 2018-12-13

## 2018-09-17 RX ORDER — RIFAXIMIN 550 MG/1
TABLET ORAL
Qty: 60 TABLET | Refills: 3 | Status: SHIPPED | OUTPATIENT
Start: 2018-09-17 | End: 2018-12-13

## 2018-09-17 RX ORDER — SPIRONOLACTONE 100 MG/1
TABLET, FILM COATED ORAL
Qty: 30 TABLET | Refills: 3 | Status: SHIPPED | OUTPATIENT
Start: 2018-09-17 | End: 2018-12-13

## 2018-09-28 ENCOUNTER — TELEPHONE (OUTPATIENT)
Dept: ENDOCRINOLOGY | Age: 58
End: 2018-09-28

## 2018-10-02 ENCOUNTER — HOSPITAL ENCOUNTER (INPATIENT)
Facility: HOSPITAL | Age: 58
LOS: 8 days | Discharge: HOME | End: 2018-10-11
Attending: EMERGENCY MEDICINE | Admitting: HOSPITALIST

## 2018-10-02 ENCOUNTER — APPOINTMENT (OUTPATIENT)
Dept: GENERAL RADIOLOGY | Facility: HOSPITAL | Age: 58
End: 2018-10-02

## 2018-10-02 ENCOUNTER — OFFICE VISIT (OUTPATIENT)
Dept: GASTROENTEROLOGY | Facility: CLINIC | Age: 58
End: 2018-10-02

## 2018-10-02 VITALS — SYSTOLIC BLOOD PRESSURE: 148 MMHG | DIASTOLIC BLOOD PRESSURE: 83 MMHG | HEART RATE: 106 BPM

## 2018-10-02 DIAGNOSIS — R73.9 HYPERGLYCEMIA: ICD-10-CM

## 2018-10-02 DIAGNOSIS — K76.82 HEPATIC ENCEPHALOPATHY (HCC): ICD-10-CM

## 2018-10-02 DIAGNOSIS — R56.9 SEIZURE (HCC): ICD-10-CM

## 2018-10-02 DIAGNOSIS — K74.60 CIRRHOSIS OF LIVER WITHOUT ASCITES, UNSPECIFIED HEPATIC CIRRHOSIS TYPE (HCC): Primary | ICD-10-CM

## 2018-10-02 DIAGNOSIS — R06.00 DYSPNEA, UNSPECIFIED TYPE: ICD-10-CM

## 2018-10-02 DIAGNOSIS — K75.81 NASH (NONALCOHOLIC STEATOHEPATITIS): ICD-10-CM

## 2018-10-02 DIAGNOSIS — D73.1 HYPERSPLENISM: ICD-10-CM

## 2018-10-02 DIAGNOSIS — D69.3 ACUTE ITP (HCC): ICD-10-CM

## 2018-10-02 DIAGNOSIS — R11.2 INTRACTABLE VOMITING WITH NAUSEA, UNSPECIFIED VOMITING TYPE: Primary | ICD-10-CM

## 2018-10-02 DIAGNOSIS — E86.0 DEHYDRATION: ICD-10-CM

## 2018-10-02 LAB
BASOPHILS # BLD AUTO: 0.03 10*3/MM3 (ref 0–0.2)
BASOPHILS NFR BLD AUTO: 0.8 % (ref 0–1.5)
DEPRECATED RDW RBC AUTO: 48.9 FL (ref 37–54)
EOSINOPHIL # BLD AUTO: 0.05 10*3/MM3 (ref 0–0.7)
EOSINOPHIL NFR BLD AUTO: 1.4 % (ref 0.3–6.2)
ERYTHROCYTE [DISTWIDTH] IN BLOOD BY AUTOMATED COUNT: 15.9 % (ref 11.7–13)
HCT VFR BLD AUTO: 38.4 % (ref 35.6–45.5)
HGB BLD-MCNC: 12.3 G/DL (ref 11.9–15.5)
IMM GRANULOCYTES # BLD: 0.02 10*3/MM3 (ref 0–0.03)
IMM GRANULOCYTES NFR BLD: 0.5 % (ref 0–0.5)
LYMPHOCYTES # BLD AUTO: 0.77 10*3/MM3 (ref 0.9–4.8)
LYMPHOCYTES NFR BLD AUTO: 21.2 % (ref 19.6–45.3)
MCH RBC QN AUTO: 27 PG (ref 26.9–32)
MCHC RBC AUTO-ENTMCNC: 32 G/DL (ref 32.4–36.3)
MCV RBC AUTO: 84.4 FL (ref 80.5–98.2)
MONOCYTES # BLD AUTO: 0.42 10*3/MM3 (ref 0.2–1.2)
MONOCYTES NFR BLD AUTO: 11.5 % (ref 5–12)
NEUTROPHILS # BLD AUTO: 2.35 10*3/MM3 (ref 1.9–8.1)
NEUTROPHILS NFR BLD AUTO: 64.6 % (ref 42.7–76)
PLATELET # BLD AUTO: 142 10*3/MM3 (ref 140–500)
PMV BLD AUTO: 10.9 FL (ref 6–12)
RBC # BLD AUTO: 4.55 10*6/MM3 (ref 3.9–5.2)
WBC NRBC COR # BLD: 3.64 10*3/MM3 (ref 4.5–10.7)

## 2018-10-02 PROCEDURE — 83690 ASSAY OF LIPASE: CPT | Performed by: PHYSICIAN ASSISTANT

## 2018-10-02 PROCEDURE — 80053 COMPREHEN METABOLIC PANEL: CPT | Performed by: PHYSICIAN ASSISTANT

## 2018-10-02 PROCEDURE — 82140 ASSAY OF AMMONIA: CPT | Performed by: PHYSICIAN ASSISTANT

## 2018-10-02 PROCEDURE — 99284 EMERGENCY DEPT VISIT MOD MDM: CPT

## 2018-10-02 PROCEDURE — 85730 THROMBOPLASTIN TIME PARTIAL: CPT | Performed by: PHYSICIAN ASSISTANT

## 2018-10-02 PROCEDURE — 93005 ELECTROCARDIOGRAM TRACING: CPT | Performed by: EMERGENCY MEDICINE

## 2018-10-02 PROCEDURE — 36415 COLL VENOUS BLD VENIPUNCTURE: CPT

## 2018-10-02 PROCEDURE — 71045 X-RAY EXAM CHEST 1 VIEW: CPT

## 2018-10-02 PROCEDURE — 84484 ASSAY OF TROPONIN QUANT: CPT | Performed by: PHYSICIAN ASSISTANT

## 2018-10-02 PROCEDURE — 99213 OFFICE O/P EST LOW 20 MIN: CPT | Performed by: INTERNAL MEDICINE

## 2018-10-02 PROCEDURE — 93010 ELECTROCARDIOGRAM REPORT: CPT | Performed by: INTERNAL MEDICINE

## 2018-10-02 PROCEDURE — 85025 COMPLETE CBC W/AUTO DIFF WBC: CPT | Performed by: PHYSICIAN ASSISTANT

## 2018-10-02 PROCEDURE — 85610 PROTHROMBIN TIME: CPT | Performed by: PHYSICIAN ASSISTANT

## 2018-10-02 RX ORDER — SODIUM CHLORIDE 0.9 % (FLUSH) 0.9 %
10 SYRINGE (ML) INJECTION AS NEEDED
Status: DISCONTINUED | OUTPATIENT
Start: 2018-10-02 | End: 2018-10-11 | Stop reason: HOSPADM

## 2018-10-02 NOTE — PROGRESS NOTES
Subjective   Silvia Zabala is a 58 y.o.. female is here today for follow-up.    Chief Complaint   Patient presents with   • Cirrhosis   • Nausea   • Vomiting   • Ascites       History of Present Illness  Patient continues to have a pretty rough go with it.  She is had problems with irritability and poor sleep, due to both high-dose steroids and encephalopathy.  She is having 3 semi-formed stools a day.  She is having no rectal bleeding.  She reports no fever.  Her last platelet count was up to 90,000 and she got a good report from her hematologist Dr. Mcdowell.  Nausea persists.  She is noticing some increasing abdominal girth.  She is not having any pedal edema, however.    The following portions of the patient's history were reviewed and updated as appropriate: allergies, current medications, past family history, past medical history, past social history, past surgical history and problem list.      Current Outpatient Prescriptions:   •  acetaminophen (TYLENOL) 325 MG tablet, Take 2 tablets by mouth Every 4 (Four) Hours As Needed for Mild Pain ., Disp: , Rfl:   •  ALPRAZolam (XANAX) 0.5 MG tablet, TAKE ONE TABLET BY MOUTH TWICE A DAY AS NEEDED FOR ANXIETY, Disp: 60 tablet, Rfl: 1  •  B-D UF III MINI PEN NEEDLES 31G X 5 MM misc, USE TO INJECT 5 TO 6 TIMES PER DAY, Disp: 200 each, Rfl: 2  •  buPROPion XL (WELLBUTRIN XL) 150 MG 24 hr tablet, TAKE ONE TABLET BY MOUTH DAILY, Disp: 90 tablet, Rfl: 3  •  cyclobenzaprine (FLEXERIL) 5 MG tablet, Take 1 tablet by mouth 3 (Three) Times a Day As Needed for Muscle Spasms., Disp: , Rfl:   •  cycloSPORINE (RESTASIS) 0.05 % ophthalmic emulsion, 1 drop 2 (two) times a day., Disp: , Rfl:   •  DULoxetine (CYMBALTA) 30 MG capsule, TAKE THREE CAPSULES BY MOUTH DAILY, Disp: 90 capsule, Rfl: 3  •  ferrous sulfate 325 (65 FE) MG tablet, TAKE ONE TABLET BY MOUTH TWICE A DAY, Disp: 60 tablet, Rfl: 3  •  folic acid (FOLVITE) 1 MG tablet, TAKE ONE TABLET BY MOUTH DAILY, Disp: 30 tablet, Rfl:  3  •  furosemide (LASIX) 40 MG tablet, Take 2 tablets by mouth Daily., Disp: 60 tablet, Rfl: 2  •  hydrOXYzine (ATARAX) 25 MG tablet, Take 50 mg by mouth 3 (Three) Times a Day As Needed for Itching., Disp: , Rfl:   •  Insulin Regular Human, Conc, (HUMULIN R U-500 KWIKPEN) 500 UNIT/ML solution pen-injector CONCENTRATED injection, Inject 0.18 mL under the skin into the appropriate area as directed 4 (Four) Times a Day., Disp: 16 pen, Rfl: 11  •  levETIRAcetam (KEPPRA) 500 MG tablet, TAKE ONE TABLET BY MOUTH TWICE A DAY, Disp: 60 tablet, Rfl: 0  •  LYRICA 75 MG capsule, TAKE ONE CAPSULE BY MOUTH TWICE A DAY, Disp: 60 capsule, Rfl: 0  •  Multiple Vitamin (MULTI-VITAMIN PO), Take 1 tablet by mouth Daily., Disp: , Rfl:   •  OxyCODONE HCl (OXAYDO) 7.5 MG tablet , Take 7.5 mg by mouth Every 8 (Eight) Hours As Needed., Disp: , Rfl:   •  pantoprazole (PROTONIX) 40 MG EC tablet, TAKE ONE TABLET BY MOUTH DAILY, Disp: 30 tablet, Rfl: 3  •  predniSONE (DELTASONE) 10 MG tablet, Take 1 tablet by mouth Daily., Disp: 90 tablet, Rfl: 3  •  promethazine (PHENERGAN) 25 MG tablet, TAKE ONE TABLET BY MOUTH EVERY 6 HOURS AS NEEDED FOR NAUSEA OR VOMITING, Disp: 30 tablet, Rfl: 0  •  RELISTOR 150 MG tablet, 450 mg Daily., Disp: , Rfl:   •  spironolactone (ALDACTONE) 100 MG tablet, TAKE ONE TABLET BY MOUTH DAILY, Disp: 30 tablet, Rfl: 3  •  sucralfate (CARAFATE) 1 g tablet, TAKE ONE TABLET BY MOUTH TWICE A DAY, Disp: 60 tablet, Rfl: 1  •  trimethobenzamide (TIGAN) 300 MG capsule, Take 300 mg by mouth 3 (Three) Times a Day., Disp: , Rfl:   •  vitamin B-12 (CYANOCOBALAMIN) 1000 MCG tablet, Take 1,000 mcg by mouth daily., Disp: , Rfl:   •  XIFAXAN 550 MG tablet, TAKE ONE TABLET BY MOUTH TWICE A DAY, Disp: 60 tablet, Rfl: 3    Family History   Problem Relation Age of Onset   • Liver disease Other    • Depression Other    • Heart disease Other    • Hypertension Other    • Diabetes Other    • Breast cancer Other    • Brain cancer Other    • Uterine  cancer Other    • Colon cancer Other    • Leukemia Other    • Colon cancer Maternal Aunt    • Hypertension Mother    • Diabetes type II Mother    • Rheum arthritis Mother    • Liver disease Mother         DUKES   • Heart disease Father    • Diabetes type II Father    • Diabetes type II Sister    • Lupus Sister    • Malig Hyperthermia Neg Hx        Review of Systems   Respiratory: Negative for shortness of breath.    Cardiovascular: Negative for chest pain.   Psychiatric/Behavioral: Positive for agitation, decreased concentration and dysphoric mood.   All other systems reviewed and are negative.      Objective   Physical Exam   Constitutional: She appears well-developed and well-nourished.   Abdominal:   Her abdomen is mildly tender, consistent with prior examinations.  There is no definite fluid wave on physical examination.   Nursing note and vitals reviewed.      Pertinent laboratory results were reviewed. , Pertinent old records were reviewed.  and Pertinent radiology results were reviewed.     Assessment/Plan   Problems Addressed this Visit        Digestive    Cirrhosis of liver without ascites (CMS/HCC) - Primary       Nervous and Auditory    Hepatic encephalopathy (CMS/HCC)    Seizure (CMS/HCC)       Musculoskeletal and Integument    Acute ITP (CMS/HCC)       Immune and Lymphatic    Hypersplenism        Patient has an element of encephalopathy.  She is taking her Xifaxan twice a day.  It is not clear that putting her on lactulose would accomplish anything and it is listed as a allergen anyway.  She is on numerous psychotropic medications which include alprazolam, bupropion, cyclobenzaprine, duloxetine, oxycodone, Lyrica, prednisone, promethazine, and Tigan.  I think she needs to come off his many of these as possible.  She is seeing Dr. Pitts on Monday and I will defer this decision is to her.  I will see her back in a couple months.

## 2018-10-03 ENCOUNTER — APPOINTMENT (OUTPATIENT)
Dept: CT IMAGING | Facility: HOSPITAL | Age: 58
End: 2018-10-03

## 2018-10-03 ENCOUNTER — APPOINTMENT (OUTPATIENT)
Dept: MRI IMAGING | Facility: HOSPITAL | Age: 58
End: 2018-10-03

## 2018-10-03 PROBLEM — R11.2 INTRACTABLE VOMITING WITH NAUSEA: Status: ACTIVE | Noted: 2018-10-03

## 2018-10-03 PROBLEM — R10.9 ABDOMINAL PAIN: Status: ACTIVE | Noted: 2017-02-14

## 2018-10-03 LAB
ALBUMIN SERPL-MCNC: 3.8 G/DL (ref 3.5–5.2)
ALBUMIN SERPL-MCNC: 3.9 G/DL (ref 3.5–5.2)
ALBUMIN/GLOB SERPL: 1.1 G/DL
ALBUMIN/GLOB SERPL: 1.2 G/DL
ALP SERPL-CCNC: 207 U/L (ref 39–117)
ALP SERPL-CCNC: 238 U/L (ref 39–117)
ALT SERPL W P-5'-P-CCNC: 30 U/L (ref 1–33)
ALT SERPL W P-5'-P-CCNC: 34 U/L (ref 1–33)
AMMONIA BLD-SCNC: 54 UMOL/L (ref 11–51)
ANION GAP SERPL CALCULATED.3IONS-SCNC: 13.8 MMOL/L
ANION GAP SERPL CALCULATED.3IONS-SCNC: 14.7 MMOL/L
APTT PPP: 30.2 SECONDS (ref 22.7–35.4)
AST SERPL-CCNC: 38 U/L (ref 1–32)
AST SERPL-CCNC: 47 U/L (ref 1–32)
BASOPHILS # BLD AUTO: 0.02 10*3/MM3 (ref 0–0.2)
BASOPHILS NFR BLD AUTO: 0.6 % (ref 0–1.5)
BILIRUB SERPL-MCNC: 1.2 MG/DL (ref 0.1–1.2)
BILIRUB SERPL-MCNC: 1.3 MG/DL (ref 0.1–1.2)
BUN BLD-MCNC: 5 MG/DL (ref 6–20)
BUN BLD-MCNC: 6 MG/DL (ref 6–20)
BUN/CREAT SERPL: 6.4 (ref 7–25)
BUN/CREAT SERPL: 7 (ref 7–25)
CALCIUM SPEC-SCNC: 10 MG/DL (ref 8.6–10.5)
CALCIUM SPEC-SCNC: 9.5 MG/DL (ref 8.6–10.5)
CHLORIDE SERPL-SCNC: 106 MMOL/L (ref 98–107)
CHLORIDE SERPL-SCNC: 99 MMOL/L (ref 98–107)
CO2 SERPL-SCNC: 22.3 MMOL/L (ref 22–29)
CO2 SERPL-SCNC: 24.2 MMOL/L (ref 22–29)
CREAT BLD-MCNC: 0.78 MG/DL (ref 0.57–1)
CREAT BLD-MCNC: 0.86 MG/DL (ref 0.57–1)
D-LACTATE SERPL-SCNC: 2 MMOL/L (ref 0.5–2)
DEPRECATED RDW RBC AUTO: 48.7 FL (ref 37–54)
EOSINOPHIL # BLD AUTO: 0.07 10*3/MM3 (ref 0–0.7)
EOSINOPHIL NFR BLD AUTO: 2.1 % (ref 0.3–6.2)
ERYTHROCYTE [DISTWIDTH] IN BLOOD BY AUTOMATED COUNT: 15.9 % (ref 11.7–13)
GFR SERPL CREATININE-BSD FRML MDRD: 68 ML/MIN/1.73
GFR SERPL CREATININE-BSD FRML MDRD: 76 ML/MIN/1.73
GLOBULIN UR ELPH-MCNC: 3.3 GM/DL
GLOBULIN UR ELPH-MCNC: 3.6 GM/DL
GLUCOSE BLD-MCNC: 158 MG/DL (ref 65–99)
GLUCOSE BLD-MCNC: 311 MG/DL (ref 65–99)
GLUCOSE BLDC GLUCOMTR-MCNC: 141 MG/DL (ref 70–130)
GLUCOSE BLDC GLUCOMTR-MCNC: 305 MG/DL (ref 70–130)
GLUCOSE BLDC GLUCOMTR-MCNC: 316 MG/DL (ref 70–130)
GLUCOSE BLDC GLUCOMTR-MCNC: 345 MG/DL (ref 70–130)
HBA1C MFR BLD: 8.6 % (ref 4.8–5.6)
HCT VFR BLD AUTO: 35.9 % (ref 35.6–45.5)
HGB BLD-MCNC: 11.4 G/DL (ref 11.9–15.5)
IMM GRANULOCYTES # BLD: 0.02 10*3/MM3 (ref 0–0.03)
IMM GRANULOCYTES NFR BLD: 0.6 % (ref 0–0.5)
INR PPP: 1.24 (ref 0.9–1.1)
LIPASE SERPL-CCNC: 12 U/L (ref 13–60)
LYMPHOCYTES # BLD AUTO: 0.77 10*3/MM3 (ref 0.9–4.8)
LYMPHOCYTES NFR BLD AUTO: 22.9 % (ref 19.6–45.3)
MCH RBC QN AUTO: 26.8 PG (ref 26.9–32)
MCHC RBC AUTO-ENTMCNC: 31.8 G/DL (ref 32.4–36.3)
MCV RBC AUTO: 84.5 FL (ref 80.5–98.2)
MONOCYTES # BLD AUTO: 0.34 10*3/MM3 (ref 0.2–1.2)
MONOCYTES NFR BLD AUTO: 10.1 % (ref 5–12)
NEUTROPHILS # BLD AUTO: 2.14 10*3/MM3 (ref 1.9–8.1)
NEUTROPHILS NFR BLD AUTO: 63.7 % (ref 42.7–76)
PLATELET # BLD AUTO: 134 10*3/MM3 (ref 140–500)
PMV BLD AUTO: 10.1 FL (ref 6–12)
POTASSIUM BLD-SCNC: 3.4 MMOL/L (ref 3.5–5.2)
POTASSIUM BLD-SCNC: 3.9 MMOL/L (ref 3.5–5.2)
PROCALCITONIN SERPL-MCNC: 0.19 NG/ML (ref 0.1–0.25)
PROT SERPL-MCNC: 7.1 G/DL (ref 6–8.5)
PROT SERPL-MCNC: 7.5 G/DL (ref 6–8.5)
PROTHROMBIN TIME: 15.4 SECONDS (ref 11.7–14.2)
RBC # BLD AUTO: 4.25 10*6/MM3 (ref 3.9–5.2)
SODIUM BLD-SCNC: 136 MMOL/L (ref 136–145)
SODIUM BLD-SCNC: 144 MMOL/L (ref 136–145)
TROPONIN T SERPL-MCNC: <0.01 NG/ML (ref 0–0.03)
WBC NRBC COR # BLD: 3.36 10*3/MM3 (ref 4.5–10.7)

## 2018-10-03 PROCEDURE — 63710000001 INSULIN ASPART PER 5 UNITS: Performed by: INTERNAL MEDICINE

## 2018-10-03 PROCEDURE — 84145 PROCALCITONIN (PCT): CPT | Performed by: HOSPITALIST

## 2018-10-03 PROCEDURE — 74176 CT ABD & PELVIS W/O CONTRAST: CPT

## 2018-10-03 PROCEDURE — 36415 COLL VENOUS BLD VENIPUNCTURE: CPT | Performed by: NURSE PRACTITIONER

## 2018-10-03 PROCEDURE — 25010000002 PROMETHAZINE PER 50 MG: Performed by: NURSE PRACTITIONER

## 2018-10-03 PROCEDURE — 99254 IP/OBS CNSLTJ NEW/EST MOD 60: CPT | Performed by: INTERNAL MEDICINE

## 2018-10-03 PROCEDURE — 99222 1ST HOSP IP/OBS MODERATE 55: CPT | Performed by: INTERNAL MEDICINE

## 2018-10-03 PROCEDURE — 82962 GLUCOSE BLOOD TEST: CPT

## 2018-10-03 PROCEDURE — 85025 COMPLETE CBC W/AUTO DIFF WBC: CPT | Performed by: NURSE PRACTITIONER

## 2018-10-03 PROCEDURE — 63710000001 INSULIN DETEMIR PER 5 UNITS: Performed by: INTERNAL MEDICINE

## 2018-10-03 PROCEDURE — 25010000002 HYDROMORPHONE PER 4 MG: Performed by: HOSPITALIST

## 2018-10-03 PROCEDURE — 25010000002 ONDANSETRON PER 1 MG: Performed by: NURSE PRACTITIONER

## 2018-10-03 PROCEDURE — 74183 MRI ABD W/O CNTR FLWD CNTR: CPT

## 2018-10-03 PROCEDURE — 80053 COMPREHEN METABOLIC PANEL: CPT | Performed by: NURSE PRACTITIONER

## 2018-10-03 PROCEDURE — 0 GADOBENATE DIMEGLUMINE 529 MG/ML SOLUTION: Performed by: HOSPITALIST

## 2018-10-03 PROCEDURE — 83605 ASSAY OF LACTIC ACID: CPT | Performed by: HOSPITALIST

## 2018-10-03 PROCEDURE — A9577 INJ MULTIHANCE: HCPCS | Performed by: HOSPITALIST

## 2018-10-03 PROCEDURE — 63710000001 PROMETHAZINE PER 25 MG: Performed by: HOSPITALIST

## 2018-10-03 PROCEDURE — 83036 HEMOGLOBIN GLYCOSYLATED A1C: CPT | Performed by: NURSE PRACTITIONER

## 2018-10-03 PROCEDURE — 63710000001 INSULIN ASPART PER 5 UNITS: Performed by: NURSE PRACTITIONER

## 2018-10-03 PROCEDURE — 25010000002 PROMETHAZINE PER 50 MG: Performed by: EMERGENCY MEDICINE

## 2018-10-03 RX ORDER — SODIUM CHLORIDE 0.9 % (FLUSH) 0.9 %
3 SYRINGE (ML) INJECTION EVERY 12 HOURS SCHEDULED
Status: DISCONTINUED | OUTPATIENT
Start: 2018-10-03 | End: 2018-10-11 | Stop reason: HOSPADM

## 2018-10-03 RX ORDER — POTASSIUM CHLORIDE 750 MG/1
40 CAPSULE, EXTENDED RELEASE ORAL ONCE
Status: COMPLETED | OUTPATIENT
Start: 2018-10-03 | End: 2018-10-03

## 2018-10-03 RX ORDER — ALPRAZOLAM 0.5 MG/1
0.5 TABLET ORAL 2 TIMES DAILY PRN
Status: DISCONTINUED | OUTPATIENT
Start: 2018-10-03 | End: 2018-10-11 | Stop reason: HOSPADM

## 2018-10-03 RX ORDER — FOLIC ACID 1 MG/1
1000 TABLET ORAL DAILY
Status: DISCONTINUED | OUTPATIENT
Start: 2018-10-03 | End: 2018-10-11 | Stop reason: HOSPADM

## 2018-10-03 RX ORDER — NICOTINE POLACRILEX 4 MG
15 LOZENGE BUCCAL
Status: DISCONTINUED | OUTPATIENT
Start: 2018-10-03 | End: 2018-10-11 | Stop reason: HOSPADM

## 2018-10-03 RX ORDER — SODIUM CHLORIDE 0.9 % (FLUSH) 0.9 %
1-10 SYRINGE (ML) INJECTION AS NEEDED
Status: DISCONTINUED | OUTPATIENT
Start: 2018-10-03 | End: 2018-10-11 | Stop reason: HOSPADM

## 2018-10-03 RX ORDER — ONDANSETRON 4 MG/1
4 TABLET, FILM COATED ORAL EVERY 6 HOURS PRN
Status: DISCONTINUED | OUTPATIENT
Start: 2018-10-03 | End: 2018-10-11 | Stop reason: HOSPADM

## 2018-10-03 RX ORDER — PREDNISONE 10 MG/1
10 TABLET ORAL DAILY
Status: DISCONTINUED | OUTPATIENT
Start: 2018-10-03 | End: 2018-10-11 | Stop reason: HOSPADM

## 2018-10-03 RX ORDER — PANTOPRAZOLE SODIUM 40 MG/1
40 TABLET, DELAYED RELEASE ORAL DAILY
Status: DISCONTINUED | OUTPATIENT
Start: 2018-10-03 | End: 2018-10-03

## 2018-10-03 RX ORDER — ACETAMINOPHEN 325 MG/1
650 TABLET ORAL EVERY 4 HOURS PRN
Status: DISCONTINUED | OUTPATIENT
Start: 2018-10-03 | End: 2018-10-11 | Stop reason: HOSPADM

## 2018-10-03 RX ORDER — POTASSIUM CHLORIDE 750 MG/1
40 CAPSULE, EXTENDED RELEASE ORAL ONCE
Status: DISCONTINUED | OUTPATIENT
Start: 2018-10-03 | End: 2018-10-03

## 2018-10-03 RX ORDER — NITROGLYCERIN 0.4 MG/1
0.4 TABLET SUBLINGUAL
Status: DISCONTINUED | OUTPATIENT
Start: 2018-10-03 | End: 2018-10-11 | Stop reason: HOSPADM

## 2018-10-03 RX ORDER — CYCLOBENZAPRINE HCL 10 MG
5 TABLET ORAL 3 TIMES DAILY PRN
Status: DISCONTINUED | OUTPATIENT
Start: 2018-10-03 | End: 2018-10-11 | Stop reason: HOSPADM

## 2018-10-03 RX ORDER — SUCRALFATE 1 G/1
1 TABLET ORAL 2 TIMES DAILY
Status: DISCONTINUED | OUTPATIENT
Start: 2018-10-03 | End: 2018-10-11 | Stop reason: HOSPADM

## 2018-10-03 RX ORDER — PROMETHAZINE HYDROCHLORIDE 25 MG/ML
12.5 INJECTION, SOLUTION INTRAMUSCULAR; INTRAVENOUS EVERY 6 HOURS PRN
Status: DISCONTINUED | OUTPATIENT
Start: 2018-10-03 | End: 2018-10-11 | Stop reason: HOSPADM

## 2018-10-03 RX ORDER — HYDROXYZINE 50 MG/1
50 TABLET, FILM COATED ORAL 3 TIMES DAILY PRN
Status: DISCONTINUED | OUTPATIENT
Start: 2018-10-03 | End: 2018-10-11 | Stop reason: HOSPADM

## 2018-10-03 RX ORDER — LEVETIRACETAM 500 MG/1
500 TABLET ORAL 2 TIMES DAILY
Status: DISCONTINUED | OUTPATIENT
Start: 2018-10-03 | End: 2018-10-11 | Stop reason: HOSPADM

## 2018-10-03 RX ORDER — OXYCODONE HYDROCHLORIDE 5 MG/1
10 TABLET ORAL EVERY 4 HOURS PRN
Status: DISCONTINUED | OUTPATIENT
Start: 2018-10-03 | End: 2018-10-11 | Stop reason: HOSPADM

## 2018-10-03 RX ORDER — PREGABALIN 75 MG/1
75 CAPSULE ORAL 2 TIMES DAILY
Status: DISCONTINUED | OUTPATIENT
Start: 2018-10-03 | End: 2018-10-11 | Stop reason: HOSPADM

## 2018-10-03 RX ORDER — ONDANSETRON 2 MG/ML
4 INJECTION INTRAMUSCULAR; INTRAVENOUS EVERY 6 HOURS PRN
Status: DISCONTINUED | OUTPATIENT
Start: 2018-10-03 | End: 2018-10-11 | Stop reason: HOSPADM

## 2018-10-03 RX ORDER — BUPROPION HYDROCHLORIDE 150 MG/1
150 TABLET ORAL DAILY
Status: DISCONTINUED | OUTPATIENT
Start: 2018-10-03 | End: 2018-10-10

## 2018-10-03 RX ORDER — HYDROMORPHONE HYDROCHLORIDE 1 MG/ML
0.5 INJECTION, SOLUTION INTRAMUSCULAR; INTRAVENOUS; SUBCUTANEOUS
Status: DISCONTINUED | OUTPATIENT
Start: 2018-10-03 | End: 2018-10-08

## 2018-10-03 RX ORDER — POTASSIUM CHLORIDE 7.45 MG/ML
10 INJECTION INTRAVENOUS
Status: DISCONTINUED | OUTPATIENT
Start: 2018-10-03 | End: 2018-10-11 | Stop reason: HOSPADM

## 2018-10-03 RX ORDER — SPIRONOLACTONE 100 MG/1
100 TABLET, FILM COATED ORAL DAILY
Status: DISCONTINUED | OUTPATIENT
Start: 2018-10-03 | End: 2018-10-11 | Stop reason: HOSPADM

## 2018-10-03 RX ORDER — ONDANSETRON 4 MG/1
4 TABLET, ORALLY DISINTEGRATING ORAL EVERY 6 HOURS PRN
Status: DISCONTINUED | OUTPATIENT
Start: 2018-10-03 | End: 2018-10-11 | Stop reason: HOSPADM

## 2018-10-03 RX ORDER — DEXTROSE MONOHYDRATE 25 G/50ML
25 INJECTION, SOLUTION INTRAVENOUS
Status: DISCONTINUED | OUTPATIENT
Start: 2018-10-03 | End: 2018-10-11 | Stop reason: HOSPADM

## 2018-10-03 RX ORDER — PROMETHAZINE HYDROCHLORIDE 25 MG/1
25 TABLET ORAL EVERY 6 HOURS PRN
Status: DISCONTINUED | OUTPATIENT
Start: 2018-10-03 | End: 2018-10-11 | Stop reason: HOSPADM

## 2018-10-03 RX ORDER — PANTOPRAZOLE SODIUM 40 MG/10ML
80 INJECTION, POWDER, LYOPHILIZED, FOR SOLUTION INTRAVENOUS ONCE
Status: COMPLETED | OUTPATIENT
Start: 2018-10-03 | End: 2018-10-03

## 2018-10-03 RX ORDER — SODIUM CHLORIDE 9 MG/ML
75 INJECTION, SOLUTION INTRAVENOUS CONTINUOUS
Status: DISCONTINUED | OUTPATIENT
Start: 2018-10-03 | End: 2018-10-08

## 2018-10-03 RX ORDER — PROMETHAZINE HYDROCHLORIDE 25 MG/ML
25 INJECTION, SOLUTION INTRAMUSCULAR; INTRAVENOUS ONCE
Status: COMPLETED | OUTPATIENT
Start: 2018-10-03 | End: 2018-10-03

## 2018-10-03 RX ADMIN — HYDROMORPHONE HYDROCHLORIDE 0.5 MG: 1 INJECTION, SOLUTION INTRAMUSCULAR; INTRAVENOUS; SUBCUTANEOUS at 22:03

## 2018-10-03 RX ADMIN — HYDROMORPHONE HYDROCHLORIDE 0.5 MG: 1 INJECTION, SOLUTION INTRAMUSCULAR; INTRAVENOUS; SUBCUTANEOUS at 16:48

## 2018-10-03 RX ADMIN — INSULIN DETEMIR 40 UNITS: 100 INJECTION, SOLUTION SUBCUTANEOUS at 15:42

## 2018-10-03 RX ADMIN — ALPRAZOLAM 0.5 MG: 0.5 TABLET ORAL at 10:40

## 2018-10-03 RX ADMIN — PROMETHAZINE HYDROCHLORIDE 12.5 MG: 25 INJECTION, SOLUTION INTRAMUSCULAR; INTRAVENOUS at 05:23

## 2018-10-03 RX ADMIN — SODIUM CHLORIDE 8 MG/HR: 900 INJECTION INTRAVENOUS at 16:38

## 2018-10-03 RX ADMIN — POTASSIUM CHLORIDE 40 MEQ: 750 CAPSULE, EXTENDED RELEASE ORAL at 12:19

## 2018-10-03 RX ADMIN — INSULIN ASPART 5 UNITS: 100 INJECTION, SOLUTION INTRAVENOUS; SUBCUTANEOUS at 11:20

## 2018-10-03 RX ADMIN — INSULIN ASPART 8 UNITS: 100 INJECTION, SOLUTION INTRAVENOUS; SUBCUTANEOUS at 16:49

## 2018-10-03 RX ADMIN — PREGABALIN 75 MG: 75 CAPSULE ORAL at 22:04

## 2018-10-03 RX ADMIN — SODIUM CHLORIDE 75 ML/HR: 9 INJECTION, SOLUTION INTRAVENOUS at 04:29

## 2018-10-03 RX ADMIN — TRIMETHOBENZAMIDE HYDROCHLORIDE 300 MG: 300 CAPSULE ORAL at 22:03

## 2018-10-03 RX ADMIN — Medication 3 ML: at 22:06

## 2018-10-03 RX ADMIN — SODIUM CHLORIDE 8 MG/HR: 900 INJECTION INTRAVENOUS at 22:09

## 2018-10-03 RX ADMIN — LEVETIRACETAM 500 MG: 500 TABLET, FILM COATED ORAL at 22:03

## 2018-10-03 RX ADMIN — RIFAXIMIN 550 MG: 550 TABLET ORAL at 22:04

## 2018-10-03 RX ADMIN — PROMETHAZINE HYDROCHLORIDE 25 MG: 25 TABLET ORAL at 12:00

## 2018-10-03 RX ADMIN — OXYCODONE HYDROCHLORIDE 10 MG: 5 TABLET ORAL at 10:40

## 2018-10-03 RX ADMIN — HYDROXYZINE HYDROCHLORIDE 50 MG: 50 TABLET, FILM COATED ORAL at 20:59

## 2018-10-03 RX ADMIN — Medication 3 ML: at 04:29

## 2018-10-03 RX ADMIN — GADOBENATE DIMEGLUMINE 17 ML: 529 INJECTION, SOLUTION INTRAVENOUS at 19:00

## 2018-10-03 RX ADMIN — PANTOPRAZOLE SODIUM 80 MG: 40 INJECTION, POWDER, FOR SOLUTION INTRAVENOUS at 16:32

## 2018-10-03 RX ADMIN — SUCRALFATE 1 G: 1 TABLET ORAL at 22:03

## 2018-10-03 RX ADMIN — INSULIN ASPART 8 UNITS: 100 INJECTION, SOLUTION INTRAVENOUS; SUBCUTANEOUS at 22:05

## 2018-10-03 RX ADMIN — ONDANSETRON 4 MG: 2 INJECTION INTRAMUSCULAR; INTRAVENOUS at 09:08

## 2018-10-03 RX ADMIN — ALPRAZOLAM 0.5 MG: 0.5 TABLET ORAL at 20:59

## 2018-10-03 RX ADMIN — SODIUM CHLORIDE 1000 ML: 9 INJECTION, SOLUTION INTRAVENOUS at 00:37

## 2018-10-03 RX ADMIN — Medication 3 ML: at 09:02

## 2018-10-03 RX ADMIN — PROMETHAZINE HYDROCHLORIDE 25 MG: 25 INJECTION, SOLUTION INTRAMUSCULAR; INTRAVENOUS at 00:44

## 2018-10-03 RX ADMIN — PREGABALIN 75 MG: 75 CAPSULE ORAL at 11:02

## 2018-10-03 RX ADMIN — PROMETHAZINE HYDROCHLORIDE 25 MG: 25 TABLET ORAL at 22:04

## 2018-10-03 NOTE — PLAN OF CARE
Problem: Patient Care Overview  Goal: Plan of Care Review  Outcome: Ongoing (interventions implemented as appropriate)   10/03/18 0252 10/03/18 0547   Plan of Care Review   Progress --  no change   OTHER   Outcome Summary --  Patient admitted to [facility] with intractable vomiting. VSS, complains of abdominal pain, fullness. GI to see today. Will continue to closely monitor.   Coping/Psychosocial   Plan of Care Reviewed With patient --      Goal: Individualization and Mutuality  Outcome: Ongoing (interventions implemented as appropriate)    Goal: Discharge Needs Assessment  Outcome: Ongoing (interventions implemented as appropriate)    Goal: Interprofessional Rounds/Family Conf  Outcome: Ongoing (interventions implemented as appropriate)      Problem: Fall Risk (Adult)  Goal: Identify Related Risk Factors and Signs and Symptoms  Outcome: Ongoing (interventions implemented as appropriate)    Goal: Absence of Fall  Outcome: Ongoing (interventions implemented as appropriate)      Problem: Nausea/Vomiting (Adult)  Goal: Identify Related Risk Factors and Signs and Symptoms  Outcome: Ongoing (interventions implemented as appropriate)    Goal: Symptom Relief  Outcome: Ongoing (interventions implemented as appropriate)    Goal: Adequate Hydration  Outcome: Ongoing (interventions implemented as appropriate)

## 2018-10-03 NOTE — CONSULTS
[facility]              Initial Inpatient Consult Note  Referring Provider: Nehemias  Reason for Consultation: Cirrhosis    Subjective     History of present illness:  58-year-old woman who carries a diagnosis of cirrhosis secondary to Wong.  She has long-standing complaints of nausea, ill-defined abdominal pain, and so forth.  These are present during virtually every office appointment that is had with her.  Other medical issues have included hepatic encephalopathy, portal hypertension, diabetes, and even bleeding duodenal ulcers and varices.  She presented to my office yesterday with her complaints of nausea and generalized abdominal discomfort.  Her physical examination was unchanged from previous examinations in her abdominal examination was benign.  Nonetheless, because of crescendo complaints was seen in the emergency department and subsequently admitted.  She had a CT scan on admission.  Radiologist commented on a very vague area in the posterior aspect of the right lobe of the liver which she thought might represent an early lesion.  She was noted to have stranding of the mesentery, probably from portal hypertension.  There is question of inflammatory change in the second portion of the duodenum as well.  Patient has been under monitored internal stress at times.  She says that this does not feel like it is stress-induced, however.  She does note that she feels short of air although her pulse oximetry reading is 99%.    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   • Cirrhosis (CMS/HCC)    • Colon polyps    • Deafness    • Depression    • Diabetes mellitus (CMS/HCC)     Adult onset, insulin requiring   • Duodenal ulcer with hemorrhage    • Esophageal varices determined  by endoscopy (CMS/HCC)    • Fibromyalgia    • Gallbladder disease    • Gastric varices    • Gastroparesis    • Glaucoma    • Granuloma annulare    • H/O B12 deficiency    • H/O Bleeding ulcer     And gastroesophageal varices   • H/O mixed connective tissue disorder    • Hemorrhoids    • Hiatal hernia    • History of transfusion    • Hx of being hospitalized     In [place] for GI bleeding from ulcer and varices   • Hyperlipidemia    • Migraine    • DUKES (nonalcoholic steatohepatitis) 11/2016   • BRICE (obstructive sleep apnea)    • Pancreas divisum    • Pancytopenia (CMS/HCC)     Chronic, due to cirrhosis and hypersplenism   • Peptic ulcer disease     And esophageal varices   • PONV (postoperative nausea and vomiting)    • RA (rheumatoid arthritis) (CMS/HCC)    • Seizure disorder (CMS/HCC)     LAST ONE SEVERAL YEARS AGO   • Seizures (CMS/HCC)    • Systemic lupus (CMS/HCC)        Past Surgical History:  Past Surgical History:   Procedure Laterality Date   • BLADDER REPAIR  1991   • CATARACT EXTRACTION     • CHOLECYSTECTOMY  1994   • ENDOSCOPY N/A 11/7/2016    Procedure: ESOPHAGOGASTRODUODENOSCOPY WITH COLD POLYPECTOMY, BANDING,  AND CLIPS TIMES 2;  Surgeon: Rich Coleman MD;  Location: [facility] ENDOSCOPY;  Service:    • ENDOSCOPY N/A 12/22/2016    Procedure: ESOPHAGOGASTRODUODENOSCOPY WITH ESOPHAGEAL BANDING. 5 BANDS FIRED, 4 BANDS ADHERERD TO MUCOSA;  Surgeon: Rich Coleman MD;  Location: [facility] ENDOSCOPY;  Service:    • ENDOSCOPY N/A 2/15/2017    Procedure: ESOPHAGOGASTRODUODENOSCOPY AT BEDSIDE with esophageal varices banding (3);  Surgeon: Rich Coleman MD;  Location: [facility] ENDOSCOPY;  Service:    • ENDOSCOPY N/A 4/6/2017    Procedure: ESOPHAGOGASTRODUODENOSCOPY WITH ESOPHAGEAL VARICES BANDING x2;  Surgeon: Rich Coleman MD;  Location: [facility] ENDOSCOPY;  Service:    • ENDOSCOPY N/A 11/24/2017    Procedure: ESOPHAGOGASTRODUODENOSCOPY with biopsies;  Surgeon: Rich Coelman MD;  Location: [facility]  "ENDOSCOPY;  Service:    • ENDOSCOPY AND COLONOSCOPY  2014    Dr. Rich Coleman with findings of candida esophagitis   • EYE SURGERY     • HYSTERECTOMY  1986    partial   • JOINT REPLACEMENT     • KNEE SURGERY Right 1995    \"right knee recontstruction\"   • LIVER BIOPSY  01/2015   • MASS EXCISION     • STOMACH SURGERY          Social History:   Social History   Substance Use Topics   • Smoking status: Former Smoker     Packs/day: 0.00     Years: 0.00     Quit date: 12/17/2015   • Smokeless tobacco: Never Used   • Alcohol use No        Family History:  Family History   Problem Relation Age of Onset   • Liver disease Other    • Depression Other    • Heart disease Other    • Hypertension Other    • Diabetes Other    • Breast cancer Other    • Brain cancer Other    • Uterine cancer Other    • Colon cancer Other    • Leukemia Other    • Colon cancer Maternal Aunt    • Hypertension Mother    • Diabetes type II Mother    • Rheum arthritis Mother    • Liver disease Mother         DUKES   • Heart disease Father    • Diabetes type II Father    • Diabetes type II Sister    • Lupus Sister    • Malig Hyperthermia Neg Hx        Home Meds:  Prescriptions Prior to Admission   Medication Sig Dispense Refill Last Dose   • acetaminophen (TYLENOL) 325 MG tablet Take 2 tablets by mouth Every 4 (Four) Hours As Needed for Mild Pain .   Taking   • ALPRAZolam (XANAX) 0.5 MG tablet TAKE ONE TABLET BY MOUTH TWICE A DAY AS NEEDED FOR ANXIETY 60 tablet 1 Taking   • B-D UF III MINI PEN NEEDLES 31G X 5 MM misc USE TO INJECT 5 TO 6 TIMES PER  each 2 Taking   • buPROPion XL (WELLBUTRIN XL) 150 MG 24 hr tablet TAKE ONE TABLET BY MOUTH DAILY 90 tablet 3 Taking   • cyclobenzaprine (FLEXERIL) 5 MG tablet Take 1 tablet by mouth 3 (Three) Times a Day As Needed for Muscle Spasms.   Taking   • DULoxetine (CYMBALTA) 30 MG capsule TAKE THREE CAPSULES BY MOUTH DAILY 90 capsule 3 Taking   • ferrous sulfate 325 (65 FE) MG tablet TAKE ONE TABLET BY MOUTH " TWICE A DAY 60 tablet 3 Taking   • folic acid (FOLVITE) 1 MG tablet TAKE ONE TABLET BY MOUTH DAILY 30 tablet 3 Taking   • furosemide (LASIX) 40 MG tablet Take 2 tablets by mouth Daily. 60 tablet 2 Taking   • hydrOXYzine (ATARAX) 25 MG tablet Take 50 mg by mouth 3 (Three) Times a Day As Needed for Itching.   Patient Taking Differently   • Insulin Regular Human, Conc, (HUMULIN R U-500 KWIKPEN) 500 UNIT/ML solution pen-injector CONCENTRATED injection Inject 0.18 mL under the skin into the appropriate area as directed 4 (Four) Times a Day. (Patient taking differently: Inject 65 Units under the skin into the appropriate area as directed 4 (Four) Times a Day Before Meals & at Bedtime.) 16 pen 11 Taking   • levETIRAcetam (KEPPRA) 500 MG tablet TAKE ONE TABLET BY MOUTH TWICE A DAY 60 tablet 0 Taking   • LYRICA 75 MG capsule TAKE ONE CAPSULE BY MOUTH TWICE A DAY 60 capsule 0 Taking   • Multiple Vitamin (MULTI-VITAMIN PO) Take 1 tablet by mouth Daily.   Taking   • OxyCODONE HCl (OXAYDO) 7.5 MG tablet  Take 7.5 mg by mouth Every 8 (Eight) Hours As Needed.   Taking   • pantoprazole (PROTONIX) 40 MG EC tablet TAKE ONE TABLET BY MOUTH DAILY 30 tablet 3 Taking   • predniSONE (DELTASONE) 10 MG tablet Take 1 tablet by mouth Daily. 90 tablet 3 Taking   • promethazine (PHENERGAN) 25 MG tablet TAKE ONE TABLET BY MOUTH EVERY 6 HOURS AS NEEDED FOR NAUSEA OR VOMITING 30 tablet 0 Taking   • RELISTOR 150 MG tablet 450 mg Daily.   Taking   • spironolactone (ALDACTONE) 100 MG tablet TAKE ONE TABLET BY MOUTH DAILY 30 tablet 3 Taking   • sucralfate (CARAFATE) 1 g tablet TAKE ONE TABLET BY MOUTH TWICE A DAY 60 tablet 1 Taking   • trimethobenzamide (TIGAN) 300 MG capsule Take 300 mg by mouth 3 (Three) Times a Day.   Taking   • XIFAXAN 550 MG tablet TAKE ONE TABLET BY MOUTH TWICE A DAY 60 tablet 3 Taking   • cycloSPORINE (RESTASIS) 0.05 % ophthalmic emulsion 1 drop 2 (two) times a day.   Taking   • vitamin B-12 (CYANOCOBALAMIN) 1000 MCG tablet  "Take 1,000 mcg by mouth daily.   Taking       Current Meds:     buPROPion  mg Oral Daily   DULoxetine 90 mg Oral Daily   folic acid 1,000 mcg Oral Daily   [START ON 10/4/2018] influenza vaccine 0.5 mL Intramuscular Once   insulin aspart 0-7 Units Subcutaneous 4x Daily With Meals & Nightly   levETIRAcetam 500 mg Oral BID   pantoprazole 40 mg Oral Daily   potassium chloride 40 mEq Oral Once   predniSONE 10 mg Oral Daily   pregabalin 75 mg Oral BID   rifaximin 550 mg Oral BID   sodium chloride 3 mL Intravenous Q12H   spironolactone 100 mg Oral Daily   sucralfate 1 g Oral BID   trimethobenzamide 300 mg Oral TID       Allergies:  Allergies   Allergen Reactions   • Albuterol Anaphylaxis   • Lactulose Nausea And Vomiting and Other (See Comments)     Severe abdominal pain   • Tramadol Other (See Comments)     Per pt causes her \"palsy, meaning shaking and tremors\" and gi upset, nausea   • Quinine Derivatives Nausea And Vomiting       Review of Systems  Pertinent items are noted in HPI, all other systems reviewed and negative    Objective     Vital Signs  Temp:  [98.2 °F (36.8 °C)-99.4 °F (37.4 °C)] 98.2 °F (36.8 °C)  Heart Rate:  [] 120  Resp:  [16-26] 22  BP: (142-162)/(69-90) 156/90    Physical Exam:   White female in no distress, actually was on the phone at the time of my visit.    Lids and conjunctivae appear pale.  PERRLA.   Hearing is fine.  External inspection of ears nose and mouth is normal.   Neck supple.  No gross thyromegaly.   No respiratory distress.  Chest clear.   Cardiac examination demonstrates no murmur or gallop.  No pedal edema.   Abdomen is mildly distended, no definite fluid wave.  Large spleen noted.  Prominent left lobe of the liver noted.   Palpable lymphadenopathy of the neck or groin.   Skin is without rash or crepitus.   Neurologic she is intact grossly.  She has intact memory, is alert and oriented.  No obvious signs of asterixis.      Results Review:   I reviewed the patient's new " clinical results.    WBC No results found for: WBCS   HGB Hemoglobin   Date Value Ref Range Status   10/03/2018 11.4 (L) 11.9 - 15.5 g/dL Final   10/02/2018 12.3 11.9 - 15.5 g/dL Final      HCT Hematocrit   Date Value Ref Range Status   10/03/2018 35.9 35.6 - 45.5 % Final   10/02/2018 38.4 35.6 - 45.5 % Final      Platlets No results found for: LABPLAT   MCV MCV   Date Value Ref Range Status   10/03/2018 84.5 80.5 - 98.2 fL Final   10/02/2018 84.4 80.5 - 98.2 fL Final          Sodium Sodium   Date Value Ref Range Status   10/03/2018 144 136 - 145 mmol/L Final   10/02/2018 136 136 - 145 mmol/L Final      Potassium Potassium   Date Value Ref Range Status   10/03/2018 3.4 (L) 3.5 - 5.2 mmol/L Final   10/02/2018 3.9 3.5 - 5.2 mmol/L Final      Chloride Chloride   Date Value Ref Range Status   10/03/2018 106 98 - 107 mmol/L Final   10/02/2018 99 98 - 107 mmol/L Final      CO2 CO2   Date Value Ref Range Status   10/03/2018 24.2 22.0 - 29.0 mmol/L Final   10/02/2018 22.3 22.0 - 29.0 mmol/L Final      BUN BUN   Date Value Ref Range Status   10/03/2018 5 (L) 6 - 20 mg/dL Final   10/02/2018 6 6 - 20 mg/dL Final      Creatinine Creatinine   Date Value Ref Range Status   10/03/2018 0.78 0.57 - 1.00 mg/dL Final   10/02/2018 0.86 0.57 - 1.00 mg/dL Final      Calcium Calcium   Date Value Ref Range Status   10/03/2018 9.5 8.6 - 10.5 mg/dL Final   10/02/2018 10.0 8.6 - 10.5 mg/dL Final      Albumin Albumin   Date Value Ref Range Status   10/03/2018 3.80 3.50 - 5.20 g/dL Final   10/02/2018 3.90 3.50 - 5.20 g/dL Final      AST  ALT  PT/INR:   AST (SGOT)   Date Value Ref Range Status   10/03/2018 38 (H) 1 - 32 U/L Final   10/02/2018 47 (H) 1 - 32 U/L Final     ALT (SGPT)   Date Value Ref Range Status   10/03/2018 30 1 - 33 U/L Final   10/02/2018 34 (H) 1 - 33 U/L Final     Protime   Date Value Ref Range Status   10/02/2018 15.4 (H) 11.7 - 14.2 Seconds Final   /  INR   Date Value Ref Range Status   10/02/2018 1.24 (H) 0.90 - 1.10 Final             Imaging Results (last 72 hours)     Procedure Component Value Units Date/Time    CT Abdomen Pelvis Without Contrast [[id number]] Collected:  10/03/18 1022     Updated:  10/03/18 1213    Narrative:       CT ABDOMEN AND PELVIS WITHOUT CONTRAST     HISTORY: Abdominal pain and distention.     TECHNIQUE: Axial CT images of the abdomen and pelvis were obtained  without administration of intravenous contrast. The patient was not  given oral contrast. Coronal and sagittal reformats were obtained.     COMPARISON: 06/14/2018.     FINDINGS: The liver demonstrates a cirrhotic morphology. There is a  hypoattenuating subcapsular region seen within the right hepatic lobe,  image 57 measuring approximately 3.8 x 2.7 cm. This is not characterized  on the current noncontrast study. May represent an area of mild  heterogeneity due to underlying fibrosis and steatosis however a mass  cannot be excluded. The spleen is enlarged measuring approximately 17.6  cm in AP dimension. The gallbladder is surgically absent. Portal  systemic collaterals are seen in the perigastric and perisplenic region.  Multiple venous collaterals are seen within the mesentery and the  omentum. The pancreas is normal without ductal dilatation. Bilateral  adrenal glands and kidneys are unremarkable. The urinary bladder is  minimally distended limiting evaluation. The uterus is not identified  and is likely surgically absent. The small and large bowel loops  demonstrate normal caliber. There is mild stranding within the mesentery  likely related to portal venous congestion. There is additionally  circumferential wall thickening involving the 2nd and 3rd portion of the  duodenum that may represent duodenitis.       Impression:       1. Cirrhosis with sequela of portal hypertension to include portal  systemic collaterals and splenomegaly. No ascites is identified.  2. Diffuse mesenteric stranding likely related to venous congestion.  3. Ill-defined  heterogenous predominantly hypoattenuating lesion within  the inferior right hepatic lobe as discussed above. This finding is not  characterized on the current noncontrast study. This may be secondary to  heterogenous/geographic steatosis and fibrosis however a small mass  lesion cannot be excluded. Follow-up with MR or CT liver mass protocol  is recommended.  4. Diffuse wall thickening and stranding surrounding the 2nd and 3rd  portion of the duodenum that may represent duodenitis.     Radiation dose reduction techniques were utilized, including automated  exposure control and exposure modulation based on body size.     This report was finalized on 10/3/2018 12:10 PM by Dr. Rose Lin M.D.       XR Chest 1 View [[id number]] Collected:  10/03/18 0000     Updated:  10/03/18 0005    Narrative:       PORTABLE CHEST RADIOGRAPH     HISTORY: Shortness of air     COMPARISON: March 30, 2018     TECHNIQUE: Single view     FINDINGS:  Cardiomediastinal silhouette appears prominent. However, this may be  related to portable technique. No pneumothorax or pleural effusion is  seen. No acute infiltrates are identified. I see no evidence of vascular  congestion.       Impression:       Cardiac size appears more prominent on today's exam. However, this may  be related to differences in technique. No acute infiltrate identified.     This report was finalized on 10/3/2018 12:02 AM by Dr. Racquel Pablo M.D.             Assessment/Plan     Active Problems:    Cirrhosis of liver (CMS/HCC)    Rheumatoid arthritis (CMS/HCC)    Anxiety and depression    Type 2 diabetes mellitus, uncontrolled, with neuropathy (CMS/HCC)    Abdominal pain    Gastroparesis    Intractable vomiting with nausea      I have reviewed the CT scan with the radiologist.  The area in the posterior aspect of the right lobe a subtle enough that I cannot see it.  Radiologist feels strongly that it might represent a problem.  We can go ahead and obtain an MRI to  sort this out, and we will go ahead and check an alpha-fetoprotein as well.  Her other issues are long-standing.  I may want to update her endoscopy in a day or 2 and determine if the findings on CT scan in regards to the duodenum are significant or not.  I calculated her meld score.  It is 10.  If the MRI does not suggest hepatocellular carcinoma, she would not appear to be an eminent transplant candidate.    I discussed the patients findings and my recommendations with patient    Rich Coleman MD  [facility]  10/03/18  1:59 PM

## 2018-10-03 NOTE — CONSULTS
58 y.o.  Patient Care Team:  Blanca Pitts MD as PCP - General (Internal Medicine)  Blanca Pitts MD as PCP - Claims Attributed  Sukhdeep Mcdowell MD as Consulting Physician (Hematology and Oncology)  Blanca Pitts MD as Referring Physician (Internal Medicine)  Rich Coleman MD as Consulting Physician (Gastroenterology)  Merary Burnett MD as Consulting Physician (Endocrinology)      Chief Complaint   Patient presents with   • Vomiting   • Nausea   • Chest Pain   • Shortness of Breath     Reason for consultation-uncontrolled type 2 diabetes mellitus.    HPI     58-year-old white female with complicated past medical history presents to the hospital with worsening abdominal pain and extremely variable blood sugar control.  Patient sees me in the clinic for the management of her type 2 diabetes mellitus.  She was last seen by me in the clinic in June 2018.  She has missed her last 2 appointments prior to this presentation as she was feeling sick and there was a death in the family.    Type 2 diabetes mellitus  Diagnosed in her 20s.  Patient has been on insulin for at least 20-25 years.  In April 2018 patient was started on U-500 insulin.  Currently at home she is taking U - 500 insulin - 65 units with each meal, 30-35 units with snack and high dose sliding scale with each meal and snack.  However patient has not been taking her insulin consistently due to her worsening gastroparesis.  She is not able to eat anything or keep anything down as a result, may be she is taking 1 or 2 shots of U-500 insulin at the most per day.  She has been trying to check her blood sugars at least 3-4 times a day.  Her blood sugars are extremely variable.  With very high and low blood sugars.  Lowest blood sugar in the last 1 month was about 39 mg/dL and highest blood sugar was around 500 mg/dL.  She has been approved for dexcom but there have been some insurance issues and it has been extremely difficult for dexcom agents to  contact the patient or vice versa.  Last eye examination was in January 2018 and no history of diabetic retinopathy.  Does have history of diabetic neuropathy and is on Lyrica 75 mg twice daily.  Her diabetic diet intake is extremely limited due to a history of gastroparesis.      Liver cirrhosis  Managed by gastroenterology.  She is also noted to have a mass on her liver which is being worked up with an MRI of her abdomen today.    ITP  During her last hospitalization patient was discharged on prednisone 60 mg oral daily.  Currently she is on 10 mg of prednisone daily.      Gastroparesis - Sees Dr. Coleman. Getting worse.          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   • Cirrhosis (CMS/HCC)    • Colon polyps    • Deafness    • Depression    • Diabetes mellitus (CMS/HCC)     Adult onset, insulin requiring   • Duodenal ulcer with hemorrhage    • Esophageal varices determined by endoscopy (CMS/HCC)    • Fibromyalgia    • Gallbladder disease    • Gastric varices    • Gastroparesis    • Glaucoma    • Granuloma annulare    • H/O B12 deficiency    • H/O Bleeding ulcer     And gastroesophageal varices   • H/O mixed connective tissue disorder    • Hemorrhoids    • Hiatal hernia    • History of transfusion    • Hx of being hospitalized     In [place] for GI bleeding from ulcer and varices   • Hyperlipidemia    • Migraine    • DUKES (nonalcoholic steatohepatitis) 11/2016   • BRICE (obstructive sleep apnea)    • Pancreas divisum    • Pancytopenia (CMS/HCC)     Chronic, due to cirrhosis and hypersplenism   • Peptic ulcer disease     And esophageal varices   • PONV (postoperative nausea and vomiting)    • RA (rheumatoid arthritis) (CMS/HCC)    • Seizure disorder (CMS/HCC)     LAST ONE SEVERAL YEARS AGO   • Seizures (CMS/HCC)    • Systemic lupus  "(CMS/HCC)        Family History   Problem Relation Age of Onset   • Liver disease Other    • Depression Other    • Heart disease Other    • Hypertension Other    • Diabetes Other    • Breast cancer Other    • Brain cancer Other    • Uterine cancer Other    • Colon cancer Other    • Leukemia Other    • Colon cancer Maternal Aunt    • Hypertension Mother    • Diabetes type II Mother    • Rheum arthritis Mother    • Liver disease Mother         DUKES   • Heart disease Father    • Diabetes type II Father    • Diabetes type II Sister    • Lupus Sister    • Malig Hyperthermia Neg Hx        Social History     Social History   • Marital status:      Spouse name: Jose   • Number of children: N/A   • Years of education: N/A     Occupational History   •  Disabled     Social History Main Topics   • Smoking status: Former Smoker     Packs/day: 0.00     Years: 0.00     Quit date: 12/17/2015   • Smokeless tobacco: Never Used   • Alcohol use No   • Drug use: No   • Sexual activity: Defer     Other Topics Concern   • Not on file     Social History Narrative    Moved to [place] from [place]. She is currently medically disabled.       Allergies   Allergen Reactions   • Albuterol Anaphylaxis   • Lactulose Nausea And Vomiting and Other (See Comments)     Severe abdominal pain   • Tramadol Other (See Comments)     Per pt causes her \"palsy, meaning shaking and tremors\" and gi upset, nausea   • Quinine Derivatives Nausea And Vomiting         Current Facility-Administered Medications:   •  acetaminophen (TYLENOL) tablet 650 mg, 650 mg, Oral, Q4H PRN, Karlene Del Cid APRN  •  ALPRAZolam (XANAX) tablet 0.5 mg, 0.5 mg, Oral, BID PRN, Jose Almanza MD, 0.5 mg at 10/03/18 1040  •  buPROPion XL (WELLBUTRIN XL) 24 hr tablet 150 mg, 150 mg, Oral, Daily, Jose Almanza MD  •  cyclobenzaprine (FLEXERIL) tablet 5 mg, 5 mg, Oral, TID PRN, Jose Almanza MD  •  dextrose (D50W) 25 g/ 50mL Intravenous Solution 25 g, 25 g, " Intravenous, Q15 Min PRN, Karlene Del Cid, APRLUIS CARLOS  •  dextrose (GLUTOSE) oral gel 15 g, 15 g, Oral, Q15 Min PRN, Karlene Del Cid, APRLUIS CARLOS  •  DULoxetine (CYMBALTA) DR capsule 90 mg, 90 mg, Oral, Daily, Jose Almanza MD  •  folic acid (FOLVITE) tablet 1,000 mcg, 1,000 mcg, Oral, Daily, Jose Almanza MD  •  glucagon (human recombinant) (GLUCAGEN DIAGNOSTIC) injection 1 mg, 1 mg, Subcutaneous, PRN, Karlene Del Cid, APRN  •  HYDROmorphone (DILAUDID) injection 0.5 mg, 0.5 mg, Intravenous, Q2H PRN, Jose Almanza MD  •  hydrOXYzine (ATARAX) tablet 50 mg, 50 mg, Oral, TID PRN, Jose Almanza MD  •  [START ON 10/4/2018] Influenza Vac Subunit Quad (FLUCELVAX) injection 0.5 mL, 0.5 mL, Intramuscular, Once, Kervin Jaffe MD  •  insulin aspart (novoLOG) injection 0-10 Units, 0-10 Units, Subcutaneous, 4x Daily With Meals & Nightly, Merary Burnett MD  •  insulin aspart (novoLOG) injection 10 Units, 10 Units, Subcutaneous, TID With Meals, Merary Burnett MD  •  insulin detemir (LEVEMIR) injection 40 Units, 40 Units, Subcutaneous, Once, Merary Burnett MD  •  [START ON 10/4/2018] insulin detemir (LEVEMIR) injection 40 Units, 40 Units, Subcutaneous, QAM, Merary Burnett MD  •  levETIRAcetam (KEPPRA) tablet 500 mg, 500 mg, Oral, BID, Jose Almanza MD  •  nitroglycerin (NITROSTAT) SL tablet 0.4 mg, 0.4 mg, Sublingual, Q5 Min PRN, Karlene Del Cid, APRN  •  ondansetron (ZOFRAN) tablet 4 mg, 4 mg, Oral, Q6H PRN **OR** ondansetron ODT (ZOFRAN-ODT) disintegrating tablet 4 mg, 4 mg, Oral, Q6H PRN **OR** ondansetron (ZOFRAN) injection 4 mg, 4 mg, Intravenous, Q6H PRN, Karlene Del Cid APRN, 4 mg at 10/03/18 0908  •  oxyCODONE (ROXICODONE) immediate release tablet 10 mg, 10 mg, Oral, Q4H PRN, Jose Almanza MD, 10 mg at 10/03/18 1040  •  pantoprazole (PROTONIX) 40 mg/100 mL (0.4 mg/mL) in 0.9% NS IVPB, 8 mg/hr, Intravenous, Continuous, Jose Almanza MD  •  pantoprazole (PROTONIX) injection 80 mg, 80 mg,  Intravenous, Once, Jose Almanza MD  •  potassium chloride 10 mEq in 100 mL IVPB, 10 mEq, Intravenous, Q1H PRN, Jose Almanza MD  •  predniSONE (DELTASONE) tablet 10 mg, 10 mg, Oral, Daily, Jose Almanza MD  •  pregabalin (LYRICA) capsule 75 mg, 75 mg, Oral, BID, Jose Almanza MD, 75 mg at 10/03/18 1102  •  promethazine (PHENERGAN) injection 12.5 mg, 12.5 mg, Intravenous, Q6H PRN, Karlene Del Cid APRN, 12.5 mg at 10/03/18 0523  •  promethazine (PHENERGAN) tablet 25 mg, 25 mg, Oral, Q6H PRN, Jose Almanza MD, 25 mg at 10/03/18 1200  •  rifaximin (XIFAXAN) tablet 550 mg, 550 mg, Oral, BID, Jose Almnaza MD  •  sodium chloride 0.9 % flush 1-10 mL, 1-10 mL, Intravenous, PRN, Karlene Del Cid, APRN  •  Insert peripheral IV, , , Once **AND** sodium chloride 0.9 % flush 10 mL, 10 mL, Intravenous, PRN, Reynaldo Albright PA  •  sodium chloride 0.9 % flush 3 mL, 3 mL, Intravenous, Q12H, Karlene Del Cid APRN, 3 mL at 10/03/18 0902  •  sodium chloride 0.9 % infusion, 75 mL/hr, Intravenous, Continuous, Karlene Del Cid, APRN, Last Rate: 75 mL/hr at 10/03/18 0429, 75 mL/hr at 10/03/18 0429  •  spironolactone (ALDACTONE) tablet 100 mg, 100 mg, Oral, Daily, Jose Almanza MD  •  sucralfate (CARAFATE) tablet 1 g, 1 g, Oral, BID, Jose Almanza MD  •  trimethobenzamide (TIGAN) capsule 300 mg, 300 mg, Oral, TID, Jose Almanza MD         Review of Systems   Constitutional: Positive for appetite change and fatigue. Negative for fever.   Respiratory: Positive for shortness of breath.    Cardiovascular: Negative for chest pain.   Gastrointestinal: Positive for abdominal pain, nausea and vomiting. Negative for diarrhea.   Endocrine: Negative for polydipsia and polyuria.   Genitourinary: Negative for flank pain.   Musculoskeletal: Positive for arthralgias and back pain. Negative for myalgias.   Skin: Positive for pallor.   Neurological: Positive for weakness. Negative for numbness.    Psychiatric/Behavioral: Negative for agitation.     Objective     Vital Signs  Temp:  [98.2 °F (36.8 °C)-99.4 °F (37.4 °C)] 98.2 °F (36.8 °C)  Heart Rate:  [] 120  Resp:  [16-26] 22  BP: (142-162)/(69-90) 156/90    Physical Exam  Physical Exam   Constitutional: She is oriented to person, place, and time. She appears well-nourished.   Obese     HENT:   Head: Normocephalic and atraumatic.   Wide neck   Eyes: Conjunctivae and EOM are normal. No scleral icterus.   Neck: Normal range of motion. Neck supple. No thyromegaly present.   Acanthosis nigricans   Cardiovascular: Normal rate.    Pulmonary/Chest: Effort normal and breath sounds normal. No stridor. She has no wheezes.   Abdominal: Soft. Bowel sounds are normal. She exhibits distension. There is no tenderness.   Central obesity   Musculoskeletal: She exhibits edema. She exhibits no tenderness.   Neurological: She is alert and oriented to person, place, and time.   Skin: Skin is warm and dry. She is not diaphoretic.   Psychiatric: She has a normal mood and affect.   Vitals reviewed.      Results Review:    I reviewed the patient's new clinical results and summarized them in the HPI and in plan.  Glucose   Date/Time Value Ref Range Status   10/03/2018 1103 316 (H) 70 - 130 mg/dL Final   10/03/2018 0614 141 (H) 70 - 130 mg/dL Final     Lab Results (last 24 hours)     Procedure Component Value Units Date/Time    POC Glucose Once [[id number]]  (Abnormal) Collected:  10/03/18 1103    Specimen:  Blood Updated:  10/03/18 1113     Glucose 316 (H) mg/dL     Narrative:       Meter: [id number] : [id number] Manisha JOE    Procalcitonin [[id number]]  (Normal) Collected:  10/03/18 0342    Specimen:  Blood Updated:  10/03/18 1002     Procalcitonin 0.19 ng/mL     Narrative:       As a Marker for Sepsis (Non-Neonates):   1. <0.5 ng/mL represents a low risk of severe sepsis and/or septic shock.  1. >2 ng/mL represents a high risk of severe sepsis and/or septic  "shock.    As a Marker for Lower Respiratory Tract Infections that require antibiotic therapy:  PCT on Admission     Antibiotic Therapy             6-12 Hrs later  > 0.5                Strongly Recommended            >0.25 - <0.5         Recommended  0.1 - 0.25           Discouraged                   Remeasure/reassess PCT  <0.1                 Strongly Discouraged          Remeasure/reassess PCT      As 28 day mortality risk marker: \"Change in Procalcitonin Result\" (> 80 % or <=80 %) if Day 0 (or Day 1) and Day 4 values are available. Refer to [email or web address] [email or web address]/   Change in PCT <=80 %   A decrease of PCT levels below or equal to 80 % defines a positive change in PCT test result representing a higher risk for 28-day all-cause mortality of patients diagnosed with severe sepsis or septic shock.  Change in PCT > 80 %   A decrease of PCT levels of more than 80 % defines a negative change in PCT result representing a lower risk for 28-day all-cause mortality of patients diagnosed with severe sepsis or septic shock.                Lactic Acid, Plasma [[id number]]  (Normal) Collected:  10/03/18 0925    Specimen:  Blood Updated:  10/03/18 0952     Lactate 2.0 mmol/L     POC Glucose Once [[id number]]  (Abnormal) Collected:  10/03/18 0614    Specimen:  Blood Updated:  10/03/18 0616     Glucose 141 (H) mg/dL     Narrative:       Meter: [id number] : [id number] Mayur JOE    Comprehensive Metabolic Panel [[id number]]  (Abnormal) Collected:  10/03/18 0342    Specimen:  Blood Updated:  10/03/18 0500     Glucose 158 (H) mg/dL      BUN 5 (L) mg/dL      Creatinine 0.78 mg/dL      Sodium 144 mmol/L      Potassium 3.4 (L) mmol/L      Chloride 106 mmol/L      CO2 24.2 mmol/L      Calcium 9.5 mg/dL      Total Protein 7.1 g/dL      Albumin 3.80 g/dL      ALT (SGPT) 30 U/L      AST (SGOT) 38 (H) U/L      Alkaline Phosphatase 207 (H) U/L      Total Bilirubin 1.3 (H) mg/dL      eGFR Non African Amer 76 mL/min/1.73  "     Globulin 3.3 gm/dL      A/G Ratio 1.2 g/dL      BUN/Creatinine Ratio 6.4 (L)     Anion Gap 13.8 mmol/L     Hemoglobin A1c [[id number]]  (Abnormal) Collected:  10/03/18 0342    Specimen:  Blood Updated:  10/03/18 0416     Hemoglobin A1C 8.60 (H) %     Narrative:       Hemoglobin A1C Ranges:    Increased Risk for Diabetes  5.7% to 6.4%  Diabetes                     >= 6.5%  Diabetic Goal                < 7.0%    CBC Auto Differential [[id number]]  (Abnormal) Collected:  10/03/18 0342    Specimen:  Blood Updated:  10/03/18 0407     WBC 3.36 (L) 10*3/mm3      RBC 4.25 10*6/mm3      Hemoglobin 11.4 (L) g/dL      Hematocrit 35.9 %      MCV 84.5 fL      MCH 26.8 (L) pg      MCHC 31.8 (L) g/dL      RDW 15.9 (H) %      RDW-SD 48.7 fl      MPV 10.1 fL      Platelets 134 (L) 10*3/mm3      Neutrophil % 63.7 %      Lymphocyte % 22.9 %      Monocyte % 10.1 %      Eosinophil % 2.1 %      Basophil % 0.6 %      Immature Grans % 0.6 (H) %      Neutrophils, Absolute 2.14 10*3/mm3      Lymphocytes, Absolute 0.77 (L) 10*3/mm3      Monocytes, Absolute 0.34 10*3/mm3      Eosinophils, Absolute 0.07 10*3/mm3      Basophils, Absolute 0.02 10*3/mm3      Immature Grans, Absolute 0.02 10*3/mm3     Comprehensive Metabolic Panel [[id number]]  (Abnormal) Collected:  10/02/18 2332    Specimen:  Blood Updated:  10/03/18 0014     Glucose 311 (H) mg/dL      BUN 6 mg/dL      Creatinine 0.86 mg/dL      Sodium 136 mmol/L      Potassium 3.9 mmol/L      Chloride 99 mmol/L      CO2 22.3 mmol/L      Calcium 10.0 mg/dL      Total Protein 7.5 g/dL      Albumin 3.90 g/dL      ALT (SGPT) 34 (H) U/L      AST (SGOT) 47 (H) U/L      Alkaline Phosphatase 238 (H) U/L      Total Bilirubin 1.2 mg/dL      eGFR Non African Amer 68 mL/min/1.73      Globulin 3.6 gm/dL      A/G Ratio 1.1 g/dL      BUN/Creatinine Ratio 7.0     Anion Gap 14.7 mmol/L     Troponin [[id number]]  (Normal) Collected:  10/02/18 2332    Specimen:  Blood Updated:  10/03/18 0010     Troponin T <0.010  ng/mL     Narrative:       Troponin T Reference Ranges:  Less than 0.03 ng/mL:    Negative for AMI  0.03 to 0.09 ng/mL:      Indeterminant for AMI  Greater than 0.09 ng/mL: Positive for AMI    Lipase [[id number]]  (Abnormal) Collected:  10/02/18 2332    Specimen:  Blood Updated:  10/03/18 0010     Lipase 12 (L) U/L     aPTT [[id number]]  (Normal) Collected:  10/02/18 2332    Specimen:  Blood Updated:  10/03/18 0001     PTT 30.2 seconds     Protime-INR [[id number]]  (Abnormal) Collected:  10/02/18 2332    Specimen:  Blood Updated:  10/03/18 0001     Protime 15.4 (H) Seconds      INR 1.24 (H)    Ammonia [[id number]]  (Abnormal) Collected:  10/02/18 2332    Specimen:  Blood Updated:  10/03/18 0000     Ammonia 54 (H) umol/L     CBC & Differential [[id number]] Collected:  10/02/18 2332    Specimen:  Blood Updated:  10/02/18 2345    Narrative:       The following orders were created for panel order CBC & Differential.  Procedure                               Abnormality         Status                     ---------                               -----------         ------                     CBC Auto Differential[[id number]]        Abnormal            Final result                 Please view results for these tests on the individual orders.    CBC Auto Differential [[id number]]  (Abnormal) Collected:  10/02/18 2332    Specimen:  Blood Updated:  10/02/18 2345     WBC 3.64 (L) 10*3/mm3      RBC 4.55 10*6/mm3      Hemoglobin 12.3 g/dL      Hematocrit 38.4 %      MCV 84.4 fL      MCH 27.0 pg      MCHC 32.0 (L) g/dL      RDW 15.9 (H) %      RDW-SD 48.9 fl      MPV 10.9 fL      Platelets 142 10*3/mm3      Neutrophil % 64.6 %      Lymphocyte % 21.2 %      Monocyte % 11.5 %      Eosinophil % 1.4 %      Basophil % 0.8 %      Immature Grans % 0.5 %      Neutrophils, Absolute 2.35 10*3/mm3      Lymphocytes, Absolute 0.77 (L) 10*3/mm3      Monocytes, Absolute 0.42 10*3/mm3      Eosinophils, Absolute 0.05 10*3/mm3      Basophils, Absolute 0.03  10*3/mm3      Immature Grans, Absolute 0.02 10*3/mm3           Imaging Results (last 24 hours)     Procedure Component Value Units Date/Time    CT Abdomen Pelvis Without Contrast [[id number]] Collected:  10/03/18 1022     Updated:  10/03/18 1213    Narrative:       CT ABDOMEN AND PELVIS WITHOUT CONTRAST     HISTORY: Abdominal pain and distention.     TECHNIQUE: Axial CT images of the abdomen and pelvis were obtained  without administration of intravenous contrast. The patient was not  given oral contrast. Coronal and sagittal reformats were obtained.     COMPARISON: 06/14/2018.     FINDINGS: The liver demonstrates a cirrhotic morphology. There is a  hypoattenuating subcapsular region seen within the right hepatic lobe,  image 57 measuring approximately 3.8 x 2.7 cm. This is not characterized  on the current noncontrast study. May represent an area of mild  heterogeneity due to underlying fibrosis and steatosis however a mass  cannot be excluded. The spleen is enlarged measuring approximately 17.6  cm in AP dimension. The gallbladder is surgically absent. Portal  systemic collaterals are seen in the perigastric and perisplenic region.  Multiple venous collaterals are seen within the mesentery and the  omentum. The pancreas is normal without ductal dilatation. Bilateral  adrenal glands and kidneys are unremarkable. The urinary bladder is  minimally distended limiting evaluation. The uterus is not identified  and is likely surgically absent. The small and large bowel loops  demonstrate normal caliber. There is mild stranding within the mesentery  likely related to portal venous congestion. There is additionally  circumferential wall thickening involving the 2nd and 3rd portion of the  duodenum that may represent duodenitis.       Impression:       1. Cirrhosis with sequela of portal hypertension to include portal  systemic collaterals and splenomegaly. No ascites is identified.  2. Diffuse mesenteric stranding likely  related to venous congestion.  3. Ill-defined heterogenous predominantly hypoattenuating lesion within  the inferior right hepatic lobe as discussed above. This finding is not  characterized on the current noncontrast study. This may be secondary to  heterogenous/geographic steatosis and fibrosis however a small mass  lesion cannot be excluded. Follow-up with MR or CT liver mass protocol  is recommended.  4. Diffuse wall thickening and stranding surrounding the 2nd and 3rd  portion of the duodenum that may represent duodenitis.     Radiation dose reduction techniques were utilized, including automated  exposure control and exposure modulation based on body size.     This report was finalized on 10/3/2018 12:10 PM by Dr. Rose Lin M.D.       XR Chest 1 View [[id number]] Collected:  10/03/18 0000     Updated:  10/03/18 0005    Narrative:       PORTABLE CHEST RADIOGRAPH     HISTORY: Shortness of air     COMPARISON: March 30, 2018     TECHNIQUE: Single view     FINDINGS:  Cardiomediastinal silhouette appears prominent. However, this may be  related to portable technique. No pneumothorax or pleural effusion is  seen. No acute infiltrates are identified. I see no evidence of vascular  congestion.       Impression:       Cardiac size appears more prominent on today's exam. However, this may  be related to differences in technique. No acute infiltrate identified.     This report was finalized on 10/3/2018 12:02 AM by Dr. Racquel Pablo M.D.             Assessment/Plan     Active Hospital Problems    Diagnosis Date Noted   • Intractable vomiting with nausea [R11.2] 10/03/2018   • Gastroparesis [K31.84] 10/17/2017   • Abdominal pain [R10.9] 02/14/2017   • Type 2 diabetes mellitus, uncontrolled, with neuropathy (CMS/HCC) [E11.40, E11.65] 03/15/2016   • Cirrhosis of liver (CMS/HCC) [K74.60] 03/08/2016   • Rheumatoid arthritis (CMS/HCC) [M06.9] 03/08/2016   • Anxiety and depression [F41.9, F32.9] 03/08/2016     "  Resolved Hospital Problems    Diagnosis Date Noted Date Resolved   No resolved problems to display.     Type 2 diabetes mellitus-blood sugars are severely uncontrolled  Patient's diabetic management is complicated with gastroparesis, steroid usage and other clinical issues.  Will stop  U-500 at this point as it could be high dose for the patient annetta when she isnt eating well.   Will start patient on levemir 40 units ×1 now  Will start levemir 40 units in the morning  Will start NovoLog 10 units with each meal along with holding parameters to hold if patient is not eating.  Will cover with NovoLog sliding scale with each meal and at bedtime.      Currently on prednisone 10 mg oral daily.    Will check TSH levels.    Discussed plan with nurse.     Thank you for your consultation.  Will follow the pt while in hospital.     Merary Burnett MD.  10/03/18  3:31 PM      EM[facility] Dragon / transcription disclaimer:    \"Dictated utilizing Dragon dictation\".   "

## 2018-10-03 NOTE — ED PROVIDER NOTES
EMERGENCY DEPARTMENT ENCOUNTER    CHIEF COMPLAINT  Chief Complaint: Abdominal pressure   History given by: patient   History limited by: n/a  Room Number: S411/1  PMD: Blanca Pitts MD      HPI:  Pt is a 58 y.o. female who presents complaining of abdominal pain, described as pressure that began today. She also complains of abd distention. Pt states that she has a hx of DUKES and cirrhosis and has required paracentesis in the past. Pt states that her blood glucose has been running >500 for the past several days. Pt also complains of nausea, vomiting, and SOA.     Duration:  1 day  Onset: gradual  Timing: constant   Location: generalized   Radiation: n/a  Quality: pressure   Intensity/Severity: moderate  Progression: unchanged   Associated Symptoms: abd distention, N/V, SOA   Aggravating Factors: none  Alleviating Factors: none    PAST MEDICAL HISTORY  Active Ambulatory Problems     Diagnosis Date Noted   • Cirrhosis of liver (CMS/HCC) 03/08/2016   • Rheumatoid arthritis (CMS/HCC) 03/08/2016   • Anxiety and depression 03/08/2016   • Type 2 diabetes mellitus, uncontrolled, with neuropathy (CMS/HCC) 03/15/2016   • Fibrositis 03/15/2016   • Change in blood platelet count 03/15/2016   • Pancytopenia (CMS/HCC) 04/12/2016   • Hypersplenism 04/12/2016   • Nausea 06/14/2016   • DUKES (nonalcoholic steatohepatitis) 06/14/2016   • Hyperlipidemia    • Generalized abdominal pain 02/14/2017   • Hematemesis 02/15/2017   • Ascites 02/21/2017   • Portal hypertension (CMS/HCC) 02/22/2017   • Systemic lupus (CMS/HCC) 02/22/2017   • Secondary esophageal varices without bleeding (CMS/HCC) 02/22/2017   • Gastroparesis 10/17/2017   • Cirrhosis of liver without ascites (CMS/HCC) 11/17/2017   • Diabetic ketoacidosis without coma associated with type 2 diabetes mellitus (CMS/HCC) 12/16/2017   • Diabetic peripheral neuropathy (CMS/HCC) 12/17/2017   • History of seizure disorder 12/17/2017   • Hepatic encephalopathy (CMS/HCC) 05/08/2018   •  Abnormal finding of blood chemistry  05/08/2018   • Fever 05/17/2018   • Acute ITP (CMS/HCC) 05/18/2018   • Degeneration of lumbosacral intervertebral disc 05/30/2018   • Seizure (CMS/HCC) 05/30/2018   • Spondylosis without myelopathy 05/30/2018     Resolved Ambulatory Problems     Diagnosis Date Noted   • Secondary esophageal varices with bleeding (CMS/HCC) 03/07/2017   • Upper GI bleed 04/25/2017   • Thrombocytopenia (CMS/HCC) 05/16/2018     Past Medical History:   Diagnosis Date   • Anemia    • Anxiety    • Arthritis    • Chromosomal abnormality    • Cirrhosis (CMS/HCC)    • Colon polyps    • Deafness    • Depression    • Diabetes mellitus (CMS/HCC)    • Duodenal ulcer with hemorrhage    • Esophageal varices determined by endoscopy (CMS/HCC)    • Fibromyalgia    • Gallbladder disease    • Gastric varices    • Gastroparesis    • Glaucoma    • Granuloma annulare    • H/O B12 deficiency    • H/O Bleeding ulcer    • H/O mixed connective tissue disorder    • Hemorrhoids    • Hiatal hernia    • History of transfusion    • Hx of being hospitalized    • Hyperlipidemia    • Migraine    • DUKES (nonalcoholic steatohepatitis) 11/2016   • BRICE (obstructive sleep apnea)    • Pancreas divisum    • Pancytopenia (CMS/HCC)    • Peptic ulcer disease    • PONV (postoperative nausea and vomiting)    • RA (rheumatoid arthritis) (CMS/HCC)    • Seizure disorder (CMS/HCC)    • Seizures (CMS/HCC)    • Systemic lupus (CMS/HCC)        PAST SURGICAL HISTORY  Past Surgical History:   Procedure Laterality Date   • BLADDER REPAIR  1991   • CATARACT EXTRACTION     • CHOLECYSTECTOMY  1994   • ENDOSCOPY N/A 11/7/2016    Procedure: ESOPHAGOGASTRODUODENOSCOPY WITH COLD POLYPECTOMY, BANDING,  AND CLIPS TIMES 2;  Surgeon: Rich Coleman MD;  Location: [facility] ENDOSCOPY;  Service:    • ENDOSCOPY N/A 12/22/2016    Procedure: ESOPHAGOGASTRODUODENOSCOPY WITH ESOPHAGEAL BANDING. 5 BANDS FIRED, 4 BANDS ADHERERD TO MUCOSA;  Surgeon: Rich Coleman MD;   "Location: [facility] ENDOSCOPY;  Service:    • ENDOSCOPY N/A 2/15/2017    Procedure: ESOPHAGOGASTRODUODENOSCOPY AT BEDSIDE with esophageal varices banding (3);  Surgeon: Rich Coleman MD;  Location: [facility] ENDOSCOPY;  Service:    • ENDOSCOPY N/A 4/6/2017    Procedure: ESOPHAGOGASTRODUODENOSCOPY WITH ESOPHAGEAL VARICES BANDING x2;  Surgeon: Rich Coleman MD;  Location: [facility] ENDOSCOPY;  Service:    • ENDOSCOPY N/A 11/24/2017    Procedure: ESOPHAGOGASTRODUODENOSCOPY with biopsies;  Surgeon: Rich Coleman MD;  Location: [facility] ENDOSCOPY;  Service:    • ENDOSCOPY AND COLONOSCOPY  2014    Dr. Rich Coleman with findings of candida esophagitis   • EYE SURGERY     • HYSTERECTOMY  1986    partial   • JOINT REPLACEMENT     • KNEE SURGERY Right 1995    \"right knee recontstruction\"   • LIVER BIOPSY  01/2015   • MASS EXCISION     • STOMACH SURGERY         FAMILY HISTORY  Family History   Problem Relation Age of Onset   • Liver disease Other    • Depression Other    • Heart disease Other    • Hypertension Other    • Diabetes Other    • Breast cancer Other    • Brain cancer Other    • Uterine cancer Other    • Colon cancer Other    • Leukemia Other    • Colon cancer Maternal Aunt    • Hypertension Mother    • Diabetes type II Mother    • Rheum arthritis Mother    • Liver disease Mother         DUKES   • Heart disease Father    • Diabetes type II Father    • Diabetes type II Sister    • Lupus Sister    • Malig Hyperthermia Neg Hx        SOCIAL HISTORY  Social History     Social History   • Marital status:      Spouse name: Jose   • Number of children: N/A   • Years of education: N/A     Occupational History   •  Disabled     Social History Main Topics   • Smoking status: Former Smoker     Packs/day: 0.00     Years: 0.00     Quit date: 12/17/2015   • Smokeless tobacco: Never Used   • Alcohol use No   • Drug use: No   • Sexual activity: Defer     Other Topics Concern   • Not on file     Social History Narrative    " Moved to [place] from [place]. She is currently medically disabled.       ALLERGIES  Albuterol; Lactulose; Tramadol; and Quinine derivatives    REVIEW OF SYSTEMS  Review of Systems   Constitutional: Negative for fever.   HENT: Negative for sore throat.    Eyes: Negative.    Respiratory: Positive for shortness of breath. Negative for cough.    Cardiovascular: Negative for chest pain.   Gastrointestinal: Positive for abdominal distention, abdominal pain (pressure), nausea and vomiting. Negative for diarrhea.   Genitourinary: Negative for dysuria.   Musculoskeletal: Negative for neck pain.   Skin: Negative for rash.   Allergic/Immunologic: Negative.    Neurological: Negative for weakness, numbness and headaches.   Hematological: Negative.    Psychiatric/Behavioral: Negative.    All other systems reviewed and are negative.         PHYSICAL EXAM  ED Triage Vitals   Temp Heart Rate Resp BP SpO2   10/02/18 2241 10/02/18 2241 10/02/18 2241 10/02/18 2244 10/02/18 2241   99.4 °F (37.4 °C) 113 26 149/72 100 %      Temp src Heart Rate Source Patient Position BP Location FiO2 (%)   -- 10/02/18 2244 10/02/18 2244 10/02/18 2244 --    Monitor Sitting Right arm        Physical Exam   Constitutional: She is oriented to person, place, and time and well-developed, well-nourished, and in no distress. No distress.   HENT:   Head: Normocephalic and atraumatic.   Mouth/Throat: Mucous membranes are dry.   Eyes: Pupils are equal, round, and reactive to light. EOM are normal.   Neck: Normal range of motion. Neck supple.   Cardiovascular: Regular rhythm and normal heart sounds.  Tachycardia present.    Pulmonary/Chest: Effort normal and breath sounds normal. No respiratory distress.   Abdominal: Soft. She exhibits distension. There is tenderness in the right upper quadrant and epigastric area. There is no rebound and no guarding.   Musculoskeletal: Normal range of motion. She exhibits no edema.   Neurological: She is alert and oriented to  person, place, and time.   Skin: Skin is warm and dry. No rash noted.   Psychiatric: Mood and affect normal.   Nursing note and vitals reviewed.      LAB RESULTS  Lab Results (last 24 hours)     Procedure Component Value Units Date/Time    CBC & Differential [[id number]] Collected:  10/02/18 2332    Specimen:  Blood Updated:  10/02/18 2345    Narrative:       The following orders were created for panel order CBC & Differential.  Procedure                               Abnormality         Status                     ---------                               -----------         ------                     CBC Auto Differential[[id number]]        Abnormal            Final result                 Please view results for these tests on the individual orders.    Comprehensive Metabolic Panel [[id number]]  (Abnormal) Collected:  10/02/18 2332    Specimen:  Blood Updated:  10/03/18 0014     Glucose 311 (H) mg/dL      BUN 6 mg/dL      Creatinine 0.86 mg/dL      Sodium 136 mmol/L      Potassium 3.9 mmol/L      Chloride 99 mmol/L      CO2 22.3 mmol/L      Calcium 10.0 mg/dL      Total Protein 7.5 g/dL      Albumin 3.90 g/dL      ALT (SGPT) 34 (H) U/L      AST (SGOT) 47 (H) U/L      Alkaline Phosphatase 238 (H) U/L      Total Bilirubin 1.2 mg/dL      eGFR Non African Amer 68 mL/min/1.73      Globulin 3.6 gm/dL      A/G Ratio 1.1 g/dL      BUN/Creatinine Ratio 7.0     Anion Gap 14.7 mmol/L     Troponin [[id number]]  (Normal) Collected:  10/02/18 2332    Specimen:  Blood Updated:  10/03/18 0010     Troponin T <0.010 ng/mL     Narrative:       Troponin T Reference Ranges:  Less than 0.03 ng/mL:    Negative for AMI  0.03 to 0.09 ng/mL:      Indeterminant for AMI  Greater than 0.09 ng/mL: Positive for AMI    Lipase [[id number]]  (Abnormal) Collected:  10/02/18 2332    Specimen:  Blood Updated:  10/03/18 0010     Lipase 12 (L) U/L     Ammonia [[id number]]  (Abnormal) Collected:  10/02/18 2332    Specimen:  Blood Updated:  10/03/18 0000      Ammonia 54 (H) umol/L     aPTT [[id number]]  (Normal) Collected:  10/02/18 2332    Specimen:  Blood Updated:  10/03/18 0001     PTT 30.2 seconds     Protime-INR [[id number]]  (Abnormal) Collected:  10/02/18 2332    Specimen:  Blood Updated:  10/03/18 0001     Protime 15.4 (H) Seconds      INR 1.24 (H)    CBC Auto Differential [[id number]]  (Abnormal) Collected:  10/02/18 2332    Specimen:  Blood Updated:  10/02/18 2345     WBC 3.64 (L) 10*3/mm3      RBC 4.55 10*6/mm3      Hemoglobin 12.3 g/dL      Hematocrit 38.4 %      MCV 84.4 fL      MCH 27.0 pg      MCHC 32.0 (L) g/dL      RDW 15.9 (H) %      RDW-SD 48.9 fl      MPV 10.9 fL      Platelets 142 10*3/mm3      Neutrophil % 64.6 %      Lymphocyte % 21.2 %      Monocyte % 11.5 %      Eosinophil % 1.4 %      Basophil % 0.8 %      Immature Grans % 0.5 %      Neutrophils, Absolute 2.35 10*3/mm3      Lymphocytes, Absolute 0.77 (L) 10*3/mm3      Monocytes, Absolute 0.42 10*3/mm3      Eosinophils, Absolute 0.05 10*3/mm3      Basophils, Absolute 0.03 10*3/mm3      Immature Grans, Absolute 0.02 10*3/mm3           I ordered the above labs and reviewed the results    RADIOLOGY  XR Chest 1 View   Final Result   Cardiac size appears more prominent on today's exam. However, this may   be related to differences in technique. No acute infiltrate identified.       This report was finalized on 10/3/2018 12:02 AM by Dr. Racquel Pablo M.D.               I ordered the above noted radiological studies. Interpreted by radiologist. Reviewed by me in PACS.       PROCEDURES  Procedures    EKG           EKG time: 23:52  Rhythm/Rate: Sinus tachycardia 100  P waves and VA: nml  QRS, axis: nml   ST and T waves: nml     Interpreted Contemporaneously by me, independently viewed  Unchanged compared to prior 12/2017    PROGRESS AND CONSULTS       23:00  CXR, CMP, CBC, troponin, lipase, ammonia, APTT, PT/INR, and EKG ordered.     23:55  BP- 162/79 HR- 102 Temp- 99.4 °F (37.4 °C) O2 sat-  100%  Informed pt of the plan for labs and CXR. Will treat nausea. Pt understands and agrees with the plan, all questions answered.    00:07  IVF ordered for hydration. Phenergan ordered to treat nausea.     00:42  BP- 152/80 HR- 101 Temp- 99.4 °F (37.4 °C) O2 sat- 100%  Rechecked the patient who is in NAD and is resting comfortably. Informed pt that her ammonia is slightly elevated, but remainder of labs show NAD. Informed her of the plan for admission for GI consult. Pt understands and agrees with the plan, all questions answered.    00:34  Call placed to [facility] for admission.    01:17  Discussed pt's case with JOEL Garcia ([facility]), who agrees to admit the pt to Dr Jaffe    MEDICAL DECISION MAKING  Results were reviewed/discussed with the patient and they were also made aware of online access. Pt also made aware that some labs, such as cultures, will not be resulted during ER visit and follow up with PMD is necessary.     MDM  Number of Diagnoses or Management Options  Dehydration:   Dyspnea, unspecified type:   Hyperglycemia:   Intractable vomiting with nausea, unspecified vomiting type:   DUKES (nonalcoholic steatohepatitis):      Amount and/or Complexity of Data Reviewed  Clinical lab tests: ordered and reviewed (Ammonia=54)  Tests in the radiology section of CPT®: ordered and reviewed (CXR shows NAD)  Tests in the medicine section of CPT®: ordered and reviewed (See EKG procedure note. )  Decide to obtain previous medical records or to obtain history from someone other than the patient: yes  Review and summarize past medical records: yes (Pt was last admitted on 5/16/18 s/t thrombocytopenia. )  Discuss the patient with other providers: yes (JOEL Garcia ([facility]))    Patient Progress  Patient progress: stable         DIAGNOSIS  Final diagnoses:   Intractable vomiting with nausea, unspecified vomiting type   Dyspnea, unspecified type   Dehydration   DUKES (nonalcoholic steatohepatitis)   Hyperglycemia        DISPOSITION  ADMISSION    Discussed treatment plan and reason for admission with pt/family and admitting physician.  Pt/family voiced understanding of the plan for admission for further testing/treatment as needed.     Latest Documented Vital Signs:  As of 3:16 AM  BP- 147/85 HR- 99 Temp- 98.8 °F (37.1 °C) (Oral) O2 sat- 100%    --  Documentation assistance provided by tori Mccullough for Dr Mccormick.  Information recorded by the scribe was done at my direction and has been verified and validated by me.        Brandi Mccullough  10/03/18 0126       Mike Mccormick MD  10/03/18 8226

## 2018-10-03 NOTE — PROGRESS NOTES
Discharge Planning Assessment  [facility]     Patient Name: Silvia Zabala  MRN: [id number]  Today's Date: 10/3/2018    Admit Date: 10/2/2018          Discharge Needs Assessment     Row Name 10/03/18 1556       Living Environment    Lives With spouse;parent(s)    Current Living Arrangements home/apartment/condo    Potentially Unsafe Housing Conditions other (see comments)   no concerns     Primary Care Provided by self;spouse/significant other    Provides Primary Care For no one    Family Caregiver if Needed spouse    Quality of Family Relationships helpful;involved;supportive    Able to Return to Prior Arrangements yes       Resource/Environmental Concerns    Resource/Environmental Concerns none    Transportation Concerns car, none       Transition Planning    Patient/Family Anticipates Transition to home with family    Patient/Family Anticipated Services at Transition none    Transportation Anticipated family or friend will provide       Discharge Needs Assessment    Readmission Within the Last 30 Days no previous admission in last 30 days    Concerns to be Addressed denies needs/concerns at this time;no discharge needs identified    Equipment Currently Used at Home cane, straight;walker, standard    Anticipated Changes Related to Illness none    Equipment Needed After Discharge none            Discharge Plan     Row Name 10/03/18 7772       Plan    Plan Home pending PT eval     Patient/Family in Agreement with Plan yes    Plan Comments CCP met with patient at bedside. CCP role explained and discharge planning discussed. Face sheet verified and IMM noted. Patient's PCP is Dr. Pitts. Patient lives with her spouse and mother in law. Patient has three steps to the entrance of her home and no steps inside. Patient uses a cane for mobility and also has a walker. Patient has not used home health or been to SNF. Patient uses [facility] on [address]; patient denies having trouble affording her medications in the  last 3 months and does not have trouble remembering to take her medications with her pill box. Patient states her spouse is able to assist her as needed but she has felt weak. CCP will follow for pending PT consult and assist as needed. Michelle Albarran CSW          Destination     No service coordination in this encounter.      Durable Medical Equipment     No service coordination in this encounter.      Dialysis/Infusion     No service coordination in this encounter.      Home Medical Care     No service coordination in this encounter.      Social Care     No service coordination in this encounter.                Demographic Summary     Row Name 10/03/18 1555       General Information    Admission Type inpatient    Arrived From emergency department    Required Notices Provided Important Message from Medicare    Referral Source admission list    Reason for Consult discharge planning    Preferred Language English     Used During This Interaction no            Functional Status     Row Name 10/03/18 1555       Functional Status    Usual Activity Tolerance good    Current Activity Tolerance moderate       Functional Status, IADL    Medications assistive equipment    Meal Preparation assistive equipment    Housekeeping assistive equipment    Laundry assistive equipment    Shopping assistive equipment       Mental Status    General Appearance WDL WDL       Mental Status Summary    Recent Changes in Mental Status/Cognitive Functioning no changes            Psychosocial    No documentation.           Abuse/Neglect    No documentation.           Legal    No documentation.           Substance Abuse    No documentation.           Patient Forms    No documentation.         RAPHAEL Myers

## 2018-10-03 NOTE — ED TRIAGE NOTES
Pt reports having n/v X 1 month. States today she has been having SOA and epigastric/ chest pain. Pt appears to be SOA while ambulating into triage. Reports hx of ITP and cirrhosis of the liver.

## 2018-10-03 NOTE — PLAN OF CARE
Problem: Patient Care Overview  Goal: Plan of Care Review   10/03/18 0547 10/03/18 0908 10/03/18 1250   Plan of Care Review   Progress no change --  --    OTHER   Outcome Summary --  --  Pt c/o abd pain and nausea. Pt VSS. pain controlled by medication. Pt recieved potassium as needed per MD order. will continue to monitor.   Coping/Psychosocial   Plan of Care Reviewed With --  patient --       10/03/18 0547 10/03/18 0908 10/03/18 1250   Plan of Care Review   Progress no change --  --    OTHER   Outcome Summary --  --  Pt c/o abd pain and nausea. Pt VSS. pain controlled by medication. Pt recieved potassium as needed per MD order. will continue to monitor.   Coping/Psychosocial   Plan of Care Reviewed With --  patient --      Goal: Individualization and Mutuality  Outcome: Ongoing (interventions implemented as appropriate)    Goal: Discharge Needs Assessment  Outcome: Ongoing (interventions implemented as appropriate)    Goal: Interprofessional Rounds/Family Conf  Outcome: Ongoing (interventions implemented as appropriate)      Problem: Fall Risk (Adult)  Goal: Identify Related Risk Factors and Signs and Symptoms  Outcome: Ongoing (interventions implemented as appropriate)    Goal: Absence of Fall  Outcome: Ongoing (interventions implemented as appropriate)      Problem: Nausea/Vomiting (Adult)  Goal: Identify Related Risk Factors and Signs and Symptoms  Outcome: Ongoing (interventions implemented as appropriate)    Goal: Symptom Relief  Outcome: Ongoing (interventions implemented as appropriate)    Goal: Adequate Hydration  Outcome: Ongoing (interventions implemented as appropriate)

## 2018-10-03 NOTE — H&P
Patient Care Team:  Blanca Pitts MD as PCP - General (Internal Medicine)  Blanca Pitts MD as PCP - Claims Attributed  Sukhdeep Mcdowell MD as Consulting Physician (Hematology and Oncology)  Blanca Pitts MD as Referring Physician (Internal Medicine)  Rich Coleman MD as Consulting Physician (Gastroenterology)  Merary Burnett MD as Consulting Physician (Endocrinology)    Chief complaint abd pain    Subjective     History of Present Illness    This is a 58-year-old female who has a history of Wong and liver cirrhosis who has required paracentesis in the past.  The patient also has a history of diabetes her blood sugars over the last several days have been elevated.  The patient now comes in because of worsening abdominal pain.  Patient was seen in the emergency room for nausea and vomiting.  The patient also states she's having difficulty getting comfortable because when she lays down he gets more nauseated when sitting up she is short of air.    Patient was last in the hospital in May and for low platelets.  Her low platelets were thought to be secondary to ITP.  The patient was given IVIG started on high-dose steroids.  And the patient followed up with hematology outpatient.  The patient has been slowly weaned off her prednisone.  She was discharged from the hospital on 60 mg a day and is currently on 10 mg by mouth.    On seeing the patient this morning she is complaining of abdominal pain.  It's generalized abdominal pain.      Review of Systems   Constitutional: Negative for activity change and appetite change.   HENT: Negative for dental problem, postnasal drip and rhinorrhea.    Respiratory: Positive for shortness of breath. Negative for apnea.    Cardiovascular: Positive for chest pain. Negative for palpitations.   Gastrointestinal: Positive for abdominal distention, abdominal pain, nausea and vomiting.   Endocrine: Negative for cold intolerance and polydipsia.   Genitourinary: Negative for  difficulty urinating and dysuria.   Musculoskeletal: Negative for arthralgias.   Neurological: Negative for dizziness and numbness.   Hematological: Negative for adenopathy.   Psychiatric/Behavioral: Negative for agitation and confusion.        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   • Cirrhosis (CMS/HCC)    • Colon polyps    • Deafness    • Depression    • Diabetes mellitus (CMS/HCC)     Adult onset, insulin requiring   • Duodenal ulcer with hemorrhage    • Esophageal varices determined by endoscopy (CMS/HCC)    • Fibromyalgia    • Gallbladder disease    • Gastric varices    • Gastroparesis    • Glaucoma    • Granuloma annulare    • H/O B12 deficiency    • H/O Bleeding ulcer     And gastroesophageal varices   • H/O mixed connective tissue disorder    • Hemorrhoids    • Hiatal hernia    • History of transfusion    • Hx of being hospitalized     In [place] for GI bleeding from ulcer and varices   • Hyperlipidemia    • Migraine    • DUKES (nonalcoholic steatohepatitis) 11/2016   • BRICE (obstructive sleep apnea)    • Pancreas divisum    • Pancytopenia (CMS/HCC)     Chronic, due to cirrhosis and hypersplenism   • Peptic ulcer disease     And esophageal varices   • PONV (postoperative nausea and vomiting)    • RA (rheumatoid arthritis) (CMS/HCC)    • Seizure disorder (CMS/HCC)     LAST ONE SEVERAL YEARS AGO   • Seizures (CMS/HCC)    • Systemic lupus (CMS/HCC)      Past Surgical History:   Procedure Laterality Date   • BLADDER REPAIR  1991   • CATARACT EXTRACTION     • CHOLECYSTECTOMY  1994   • ENDOSCOPY N/A 11/7/2016    Procedure: ESOPHAGOGASTRODUODENOSCOPY WITH COLD POLYPECTOMY, BANDING,  AND CLIPS TIMES 2;  Surgeon: Rich Coleman MD;  Location: [facility] ENDOSCOPY;  Service:    • ENDOSCOPY N/A 12/22/2016    Procedure:  "ESOPHAGOGASTRODUODENOSCOPY WITH ESOPHAGEAL BANDING. 5 BANDS FIRED, 4 BANDS ADHERERD TO MUCOSA;  Surgeon: Rich Coleman MD;  Location: [facility] ENDOSCOPY;  Service:    • ENDOSCOPY N/A 2/15/2017    Procedure: ESOPHAGOGASTRODUODENOSCOPY AT BEDSIDE with esophageal varices banding (3);  Surgeon: Rich Coleman MD;  Location: [facility] ENDOSCOPY;  Service:    • ENDOSCOPY N/A 4/6/2017    Procedure: ESOPHAGOGASTRODUODENOSCOPY WITH ESOPHAGEAL VARICES BANDING x2;  Surgeon: Rich Coleman MD;  Location: [facility] ENDOSCOPY;  Service:    • ENDOSCOPY N/A 11/24/2017    Procedure: ESOPHAGOGASTRODUODENOSCOPY with biopsies;  Surgeon: Rich Coleman MD;  Location: [facility] ENDOSCOPY;  Service:    • ENDOSCOPY AND COLONOSCOPY  2014    Dr. Rich Coleman with findings of candida esophagitis   • EYE SURGERY     • HYSTERECTOMY  1986    partial   • JOINT REPLACEMENT     • KNEE SURGERY Right 1995    \"right knee recontstruction\"   • LIVER BIOPSY  01/2015   • MASS EXCISION     • STOMACH SURGERY       Family History   Problem Relation Age of Onset   • Liver disease Other    • Depression Other    • Heart disease Other    • Hypertension Other    • Diabetes Other    • Breast cancer Other    • Brain cancer Other    • Uterine cancer Other    • Colon cancer Other    • Leukemia Other    • Colon cancer Maternal Aunt    • Hypertension Mother    • Diabetes type II Mother    • Rheum arthritis Mother    • Liver disease Mother         DUKES   • Heart disease Father    • Diabetes type II Father    • Diabetes type II Sister    • Lupus Sister    • Malig Hyperthermia Neg Hx      Social History   Substance Use Topics   • Smoking status: Former Smoker     Packs/day: 0.00     Years: 0.00     Quit date: 12/17/2015   • Smokeless tobacco: Never Used   • Alcohol use No     Prescriptions Prior to Admission   Medication Sig Dispense Refill Last Dose   • acetaminophen (TYLENOL) 325 MG tablet Take 2 tablets by mouth Every 4 (Four) Hours As Needed for Mild Pain .   Taking "   • ALPRAZolam (XANAX) 0.5 MG tablet TAKE ONE TABLET BY MOUTH TWICE A DAY AS NEEDED FOR ANXIETY 60 tablet 1 Taking   • B-D UF III MINI PEN NEEDLES 31G X 5 MM misc USE TO INJECT 5 TO 6 TIMES PER  each 2 Taking   • buPROPion XL (WELLBUTRIN XL) 150 MG 24 hr tablet TAKE ONE TABLET BY MOUTH DAILY 90 tablet 3 Taking   • cyclobenzaprine (FLEXERIL) 5 MG tablet Take 1 tablet by mouth 3 (Three) Times a Day As Needed for Muscle Spasms.   Taking   • DULoxetine (CYMBALTA) 30 MG capsule TAKE THREE CAPSULES BY MOUTH DAILY 90 capsule 3 Taking   • ferrous sulfate 325 (65 FE) MG tablet TAKE ONE TABLET BY MOUTH TWICE A DAY 60 tablet 3 Taking   • folic acid (FOLVITE) 1 MG tablet TAKE ONE TABLET BY MOUTH DAILY 30 tablet 3 Taking   • furosemide (LASIX) 40 MG tablet Take 2 tablets by mouth Daily. 60 tablet 2 Taking   • hydrOXYzine (ATARAX) 25 MG tablet Take 50 mg by mouth 3 (Three) Times a Day As Needed for Itching.   Patient Taking Differently   • Insulin Regular Human, Conc, (HUMULIN R U-500 KWIKPEN) 500 UNIT/ML solution pen-injector CONCENTRATED injection Inject 0.18 mL under the skin into the appropriate area as directed 4 (Four) Times a Day. (Patient taking differently: Inject 65 Units under the skin into the appropriate area as directed 4 (Four) Times a Day Before Meals & at Bedtime.) 16 pen 11 Taking   • levETIRAcetam (KEPPRA) 500 MG tablet TAKE ONE TABLET BY MOUTH TWICE A DAY 60 tablet 0 Taking   • LYRICA 75 MG capsule TAKE ONE CAPSULE BY MOUTH TWICE A DAY 60 capsule 0 Taking   • Multiple Vitamin (MULTI-VITAMIN PO) Take 1 tablet by mouth Daily.   Taking   • OxyCODONE HCl (OXAYDO) 7.5 MG tablet  Take 7.5 mg by mouth Every 8 (Eight) Hours As Needed.   Taking   • pantoprazole (PROTONIX) 40 MG EC tablet TAKE ONE TABLET BY MOUTH DAILY 30 tablet 3 Taking   • predniSONE (DELTASONE) 10 MG tablet Take 1 tablet by mouth Daily. 90 tablet 3 Taking   • promethazine (PHENERGAN) 25 MG tablet TAKE ONE TABLET BY MOUTH EVERY 6 HOURS AS  NEEDED FOR NAUSEA OR VOMITING 30 tablet 0 Taking   • RELISTOR 150 MG tablet 450 mg Daily.   Taking   • spironolactone (ALDACTONE) 100 MG tablet TAKE ONE TABLET BY MOUTH DAILY 30 tablet 3 Taking   • sucralfate (CARAFATE) 1 g tablet TAKE ONE TABLET BY MOUTH TWICE A DAY 60 tablet 1 Taking   • trimethobenzamide (TIGAN) 300 MG capsule Take 300 mg by mouth 3 (Three) Times a Day.   Taking   • XIFAXAN 550 MG tablet TAKE ONE TABLET BY MOUTH TWICE A DAY 60 tablet 3 Taking   • cycloSPORINE (RESTASIS) 0.05 % ophthalmic emulsion 1 drop 2 (two) times a day.   Taking   • vitamin B-12 (CYANOCOBALAMIN) 1000 MCG tablet Take 1,000 mcg by mouth daily.   Taking     Allergies:  Albuterol; Lactulose; Tramadol; and Quinine derivatives    Objective      Vital Signs  Temp:  [98.7 °F (37.1 °C)-99.4 °F (37.4 °C)] 98.7 °F (37.1 °C)  Heart Rate:  [] 109  Resp:  [16-26] 22  BP: (142-162)/(69-85) 142/72    Physical Exam   Constitutional: She appears well-developed and well-nourished.   HENT:   Head: Normocephalic and atraumatic.   Mouth/Throat: No oropharyngeal exudate.   Eyes: Pupils are equal, round, and reactive to light. EOM are normal.   Neck: Normal range of motion. Neck supple. No tracheal deviation present. No thyromegaly present.   Cardiovascular: Normal rate and regular rhythm.  Exam reveals no gallop and no friction rub.    No murmur heard.  Pulmonary/Chest: Effort normal and breath sounds normal. No respiratory distress. She has no wheezes.   Abdominal: She exhibits distension. There is tenderness.   Musculoskeletal: Normal range of motion. She exhibits no edema or deformity.   Neurological: She is alert.   Skin: Skin is warm and dry. No erythema. No pallor.   Psychiatric: She has a normal mood and affect. Her behavior is normal.       Results Review:   I reviewed the patient's new clinical results.  I reviewed the patient's new imaging results and agree with the interpretation.  I personally viewed and interpreted the patient's  EKG/Telemetry data      Assessment/Plan     Active Problems:    Abdominal pain    Cirrhosis of liver (CMS/HCC)    Type 2 diabetes mellitus, uncontrolled, with neuropathy (CMS/HCC)    Gastroparesis    Rheumatoid arthritis (CMS/HCC)    Anxiety and depression    Intractable vomiting with nausea      Assessment & Plan      Abdominal pain  -CT scan of the abdomen  -Consult gastroenterology  -Concern for SBP but the patient's white count is normal and the patient is afebrile  -Lipase is normal  -Because of the concern for SBP will go ahead and get a pro-calcitonin level and lactic acid level.  Patient does have abdominal pain but suspect some of this is chronic.      Cirrhosis of liver (CMS/HCC)-  -Continue with Xifaxan      Type 2 diabetes mellitus, uncontrolled, with neuropathy (CMS/HCC)-  -Will continue with a sliding scale insulin and will ask her endocrinologist to see the patient here in the hospital.      Gastroparesis /  Intractable vomiting with nausea-  -Monitor.  Currently nothing by mouth until CT done      Rheumatoid arthritis (CMS/HCC)      Anxiety and depression    ITP for which the patient is on steroids and received Rituxan outpatient            I discussed the patients findings and my recommendations with patient    Jose Almazna MD  10/03/18  8:37 AM    Time:

## 2018-10-04 PROBLEM — K92.2 UGI BLEED: Status: ACTIVE | Noted: 2018-10-04

## 2018-10-04 LAB
ALBUMIN SERPL-MCNC: 3.3 G/DL (ref 3.5–5.2)
ALBUMIN/GLOB SERPL: 1.1 G/DL
ALP SERPL-CCNC: 177 U/L (ref 39–117)
ALT SERPL W P-5'-P-CCNC: 23 U/L (ref 1–33)
ANION GAP SERPL CALCULATED.3IONS-SCNC: 14.1 MMOL/L
AST SERPL-CCNC: 30 U/L (ref 1–32)
BASOPHILS # BLD AUTO: 0.02 10*3/MM3 (ref 0–0.2)
BASOPHILS NFR BLD AUTO: 1 % (ref 0–1.5)
BILIRUB SERPL-MCNC: 1.5 MG/DL (ref 0.1–1.2)
BUN BLD-MCNC: 9 MG/DL (ref 6–20)
BUN/CREAT SERPL: 11.3 (ref 7–25)
CALCIUM SPEC-SCNC: 8.3 MG/DL (ref 8.6–10.5)
CHLORIDE SERPL-SCNC: 107 MMOL/L (ref 98–107)
CO2 SERPL-SCNC: 17.9 MMOL/L (ref 22–29)
CREAT BLD-MCNC: 0.8 MG/DL (ref 0.57–1)
D-LACTATE SERPL-SCNC: 1.8 MMOL/L (ref 0.5–2)
DEPRECATED RDW RBC AUTO: 52.3 FL (ref 37–54)
EOSINOPHIL # BLD AUTO: 0.08 10*3/MM3 (ref 0–0.7)
EOSINOPHIL NFR BLD AUTO: 4 % (ref 0.3–6.2)
ERYTHROCYTE [DISTWIDTH] IN BLOOD BY AUTOMATED COUNT: 16.7 % (ref 11.7–13)
GFR SERPL CREATININE-BSD FRML MDRD: 74 ML/MIN/1.73
GLOBULIN UR ELPH-MCNC: 2.9 GM/DL
GLUCOSE BLD-MCNC: 204 MG/DL (ref 65–99)
GLUCOSE BLDC GLUCOMTR-MCNC: 152 MG/DL (ref 70–130)
GLUCOSE BLDC GLUCOMTR-MCNC: 158 MG/DL (ref 70–130)
GLUCOSE BLDC GLUCOMTR-MCNC: 166 MG/DL (ref 70–130)
GLUCOSE BLDC GLUCOMTR-MCNC: 169 MG/DL (ref 70–130)
GLUCOSE BLDC GLUCOMTR-MCNC: 186 MG/DL (ref 70–130)
GLUCOSE BLDC GLUCOMTR-MCNC: 195 MG/DL (ref 70–130)
GLUCOSE BLDC GLUCOMTR-MCNC: 223 MG/DL (ref 70–130)
HCT VFR BLD AUTO: 32.6 % (ref 35.6–45.5)
HGB BLD-MCNC: 10.5 G/DL (ref 11.9–15.5)
IMM GRANULOCYTES # BLD: 0.01 10*3/MM3 (ref 0–0.03)
IMM GRANULOCYTES NFR BLD: 0.5 % (ref 0–0.5)
LYMPHOCYTES # BLD AUTO: 0.56 10*3/MM3 (ref 0.9–4.8)
LYMPHOCYTES NFR BLD AUTO: 27.7 % (ref 19.6–45.3)
MCH RBC QN AUTO: 27.5 PG (ref 26.9–32)
MCHC RBC AUTO-ENTMCNC: 32.2 G/DL (ref 32.4–36.3)
MCV RBC AUTO: 85.3 FL (ref 80.5–98.2)
MONOCYTES # BLD AUTO: 0.34 10*3/MM3 (ref 0.2–1.2)
MONOCYTES NFR BLD AUTO: 16.8 % (ref 5–12)
NEUTROPHILS # BLD AUTO: 1.02 10*3/MM3 (ref 1.9–8.1)
NEUTROPHILS NFR BLD AUTO: 50.5 % (ref 42.7–76)
PLATELET # BLD AUTO: 109 10*3/MM3 (ref 140–500)
PMV BLD AUTO: 10 FL (ref 6–12)
POTASSIUM BLD-SCNC: 4.1 MMOL/L (ref 3.5–5.2)
PROT SERPL-MCNC: 6.2 G/DL (ref 6–8.5)
RBC # BLD AUTO: 3.82 10*6/MM3 (ref 3.9–5.2)
SODIUM BLD-SCNC: 139 MMOL/L (ref 136–145)
TROPONIN T SERPL-MCNC: <0.01 NG/ML (ref 0–0.03)
WBC NRBC COR # BLD: 2.02 10*3/MM3 (ref 4.5–10.7)

## 2018-10-04 PROCEDURE — 99232 SBSQ HOSP IP/OBS MODERATE 35: CPT | Performed by: INTERNAL MEDICINE

## 2018-10-04 PROCEDURE — 85025 COMPLETE CBC W/AUTO DIFF WBC: CPT | Performed by: HOSPITALIST

## 2018-10-04 PROCEDURE — 90661 CCIIV3 VAC ABX FR 0.5 ML IM: CPT | Performed by: HOSPITALIST

## 2018-10-04 PROCEDURE — 93010 ELECTROCARDIOGRAM REPORT: CPT | Performed by: INTERNAL MEDICINE

## 2018-10-04 PROCEDURE — 84484 ASSAY OF TROPONIN QUANT: CPT | Performed by: HOSPITALIST

## 2018-10-04 PROCEDURE — 82105 ALPHA-FETOPROTEIN SERUM: CPT | Performed by: INTERNAL MEDICINE

## 2018-10-04 PROCEDURE — 25010000002 HYDROMORPHONE PER 4 MG: Performed by: HOSPITALIST

## 2018-10-04 PROCEDURE — 63710000001 INSULIN ASPART PER 5 UNITS: Performed by: INTERNAL MEDICINE

## 2018-10-04 PROCEDURE — 25010000002 INFLUENZA VAC SUBUNIT QUAD 0.5 ML SUSPENSION PREFILLED SYRINGE: Performed by: HOSPITALIST

## 2018-10-04 PROCEDURE — 25010000002 PROMETHAZINE PER 50 MG: Performed by: NURSE PRACTITIONER

## 2018-10-04 PROCEDURE — G0008 ADMIN INFLUENZA VIRUS VAC: HCPCS | Performed by: HOSPITALIST

## 2018-10-04 PROCEDURE — 93005 ELECTROCARDIOGRAM TRACING: CPT | Performed by: HOSPITALIST

## 2018-10-04 PROCEDURE — 63710000001 INSULIN DETEMIR PER 5 UNITS: Performed by: INTERNAL MEDICINE

## 2018-10-04 PROCEDURE — 80053 COMPREHEN METABOLIC PANEL: CPT | Performed by: HOSPITALIST

## 2018-10-04 PROCEDURE — 63710000001 PREDNISONE PER 5 MG: Performed by: HOSPITALIST

## 2018-10-04 PROCEDURE — 63710000001 PROMETHAZINE PER 25 MG: Performed by: HOSPITALIST

## 2018-10-04 PROCEDURE — 82962 GLUCOSE BLOOD TEST: CPT

## 2018-10-04 PROCEDURE — 83605 ASSAY OF LACTIC ACID: CPT | Performed by: HOSPITALIST

## 2018-10-04 RX ADMIN — RIFAXIMIN 550 MG: 550 TABLET ORAL at 21:56

## 2018-10-04 RX ADMIN — PROMETHAZINE HYDROCHLORIDE 25 MG: 25 TABLET ORAL at 09:28

## 2018-10-04 RX ADMIN — INSULIN ASPART 10 UNITS: 100 INJECTION, SOLUTION INTRAVENOUS; SUBCUTANEOUS at 12:20

## 2018-10-04 RX ADMIN — PREGABALIN 75 MG: 75 CAPSULE ORAL at 22:09

## 2018-10-04 RX ADMIN — CYCLOBENZAPRINE 5 MG: 10 TABLET, FILM COATED ORAL at 11:37

## 2018-10-04 RX ADMIN — RIFAXIMIN 550 MG: 550 TABLET ORAL at 09:17

## 2018-10-04 RX ADMIN — INSULIN ASPART 2 UNITS: 100 INJECTION, SOLUTION INTRAVENOUS; SUBCUTANEOUS at 12:20

## 2018-10-04 RX ADMIN — ALPRAZOLAM 0.5 MG: 0.5 TABLET ORAL at 22:09

## 2018-10-04 RX ADMIN — SODIUM CHLORIDE 8 MG/HR: 900 INJECTION INTRAVENOUS at 19:12

## 2018-10-04 RX ADMIN — SUCRALFATE 1 G: 1 TABLET ORAL at 21:56

## 2018-10-04 RX ADMIN — SODIUM CHLORIDE 8 MG/HR: 900 INJECTION INTRAVENOUS at 02:59

## 2018-10-04 RX ADMIN — INSULIN ASPART 2 UNITS: 100 INJECTION, SOLUTION INTRAVENOUS; SUBCUTANEOUS at 09:16

## 2018-10-04 RX ADMIN — Medication 3 ML: at 09:28

## 2018-10-04 RX ADMIN — INSULIN ASPART 10 UNITS: 100 INJECTION, SOLUTION INTRAVENOUS; SUBCUTANEOUS at 17:08

## 2018-10-04 RX ADMIN — SPIRONOLACTONE 100 MG: 100 TABLET ORAL at 09:17

## 2018-10-04 RX ADMIN — LEVETIRACETAM 500 MG: 500 TABLET, FILM COATED ORAL at 21:56

## 2018-10-04 RX ADMIN — SUCRALFATE 1 G: 1 TABLET ORAL at 09:17

## 2018-10-04 RX ADMIN — INSULIN ASPART 4 UNITS: 100 INJECTION, SOLUTION INTRAVENOUS; SUBCUTANEOUS at 17:08

## 2018-10-04 RX ADMIN — PREGABALIN 75 MG: 75 CAPSULE ORAL at 09:18

## 2018-10-04 RX ADMIN — BUPROPION HYDROCHLORIDE 150 MG: 150 TABLET, FILM COATED, EXTENDED RELEASE ORAL at 09:17

## 2018-10-04 RX ADMIN — LEVETIRACETAM 500 MG: 500 TABLET, FILM COATED ORAL at 09:17

## 2018-10-04 RX ADMIN — INSULIN DETEMIR 10 UNITS: 100 INJECTION, SOLUTION SUBCUTANEOUS at 22:10

## 2018-10-04 RX ADMIN — SODIUM CHLORIDE 8 MG/HR: 900 INJECTION INTRAVENOUS at 09:16

## 2018-10-04 RX ADMIN — PREDNISONE 10 MG: 10 TABLET ORAL at 09:17

## 2018-10-04 RX ADMIN — HYDROMORPHONE HYDROCHLORIDE 0.5 MG: 1 INJECTION, SOLUTION INTRAMUSCULAR; INTRAVENOUS; SUBCUTANEOUS at 17:40

## 2018-10-04 RX ADMIN — FOLIC ACID 1000 MCG: 1 TABLET ORAL at 09:17

## 2018-10-04 RX ADMIN — SODIUM CHLORIDE 75 ML/HR: 9 INJECTION, SOLUTION INTRAVENOUS at 14:28

## 2018-10-04 RX ADMIN — HYDROMORPHONE HYDROCHLORIDE 0.5 MG: 1 INJECTION, SOLUTION INTRAMUSCULAR; INTRAVENOUS; SUBCUTANEOUS at 22:09

## 2018-10-04 RX ADMIN — HYDROMORPHONE HYDROCHLORIDE 0.5 MG: 1 INJECTION, SOLUTION INTRAMUSCULAR; INTRAVENOUS; SUBCUTANEOUS at 12:20

## 2018-10-04 RX ADMIN — A/SINGAPORE/GP1908/2015 IVR-180 (H1N1) (AN A/MICHIGAN/45/2015-LIKE VIRUS), A/SINGAPORE/GP2050/2015 (H3N2) (AN A/HONG KONG/4801/2014 - LIKE VIRUS), B/UTAH/9/2014 (A B/PHUKET/3073/2013-LIKE VIRUS), B/HONG KONG/259/2010 (A B/BRISBANE/60/08-LIKE VIRUS) 0.5 ML: 15; 15; 15; 15 INJECTION, SUSPENSION INTRAMUSCULAR at 12:10

## 2018-10-04 RX ADMIN — TRIMETHOBENZAMIDE HYDROCHLORIDE 300 MG: 300 CAPSULE ORAL at 17:07

## 2018-10-04 RX ADMIN — HYDROMORPHONE HYDROCHLORIDE 0.5 MG: 1 INJECTION, SOLUTION INTRAMUSCULAR; INTRAVENOUS; SUBCUTANEOUS at 15:32

## 2018-10-04 RX ADMIN — HYDROMORPHONE HYDROCHLORIDE 0.5 MG: 1 INJECTION, SOLUTION INTRAMUSCULAR; INTRAVENOUS; SUBCUTANEOUS at 09:28

## 2018-10-04 RX ADMIN — INSULIN ASPART 2 UNITS: 100 INJECTION, SOLUTION INTRAVENOUS; SUBCUTANEOUS at 22:10

## 2018-10-04 RX ADMIN — DULOXETINE HYDROCHLORIDE 90 MG: 60 CAPSULE, DELAYED RELEASE ORAL at 09:17

## 2018-10-04 RX ADMIN — TRIMETHOBENZAMIDE HYDROCHLORIDE 300 MG: 300 CAPSULE ORAL at 21:56

## 2018-10-04 RX ADMIN — INSULIN DETEMIR 40 UNITS: 100 INJECTION, SOLUTION SUBCUTANEOUS at 06:35

## 2018-10-04 RX ADMIN — INSULIN ASPART 10 UNITS: 100 INJECTION, SOLUTION INTRAVENOUS; SUBCUTANEOUS at 09:16

## 2018-10-04 RX ADMIN — HYDROXYZINE HYDROCHLORIDE 50 MG: 50 TABLET, FILM COATED ORAL at 11:37

## 2018-10-04 RX ADMIN — TRIMETHOBENZAMIDE HYDROCHLORIDE 300 MG: 300 CAPSULE ORAL at 09:17

## 2018-10-04 RX ADMIN — PROMETHAZINE HYDROCHLORIDE 12.5 MG: 25 INJECTION, SOLUTION INTRAMUSCULAR; INTRAVENOUS at 17:02

## 2018-10-04 RX ADMIN — Medication 3 ML: at 21:58

## 2018-10-04 NOTE — PROGRESS NOTES
[facility]/[place]     Inpatient Follow Up Note    Patient Identification:  Name: Silvia Zabala  Age: 58 y.o.  Sex: female  :  1960  MRN: [id number]    Information from:patient and family     CC:Cirrhosis, N/V    History: Patient was encouraged about the MRI results which did not confirm any evidence of hepatocellular carcinoma.  Alpha total protein is pending.  However, she is had recurrent severe nausea and vomiting today and feels pretty miserable.      Review of Systems:  Constitutional:  Negative   Cardiovascular:  Negative   Respiratory:  Negative             Problem List:  Patient Active Problem List    Diagnosis   • Intractable vomiting with nausea [R11.2]   • Degeneration of lumbosacral intervertebral disc [M51.37]   • Seizure (CMS/HCC) [R56.9]   • Spondylosis without myelopathy [M47.819]   • Acute ITP (CMS/HCC) [D69.3]   • Fever [R50.9]   • Hepatic encephalopathy (CMS/HCC) [K72.90]   • Abnormal finding of blood chemistry  [R79.9]   • Diabetic peripheral neuropathy (CMS/HCC) [E11.42]   • History of seizure disorder [Z86.69]   • Diabetic ketoacidosis without coma associated with type 2 diabetes mellitus (CMS/HCC) [E11.10]   • Cirrhosis of liver without ascites (CMS/HCC) [K74.60]     Overview Note:     Added automatically from request for surgery [id number]     • Gastroparesis [K31.84]   • Portal hypertension (CMS/HCC) [K76.6]   • Systemic lupus (CMS/HCC) [M32.9]   • Secondary esophageal varices without bleeding (CMS/HCC) [I85.10]   • Ascites [R18.8]   • Hematemesis [K92.0]   • Abdominal pain [R10.9]   • Hyperlipidemia [E78.5]   • Nausea [R11.0]   • DUKES (nonalcoholic steatohepatitis) [K75.81]   • Pancytopenia (CMS/HCC) [D61.818]   • Hypersplenism [D73.1]   • Type 2 diabetes mellitus, uncontrolled, with neuropathy (CMS/HCC) [E11.40, E11.65]   • Fibrositis [M79.7]   • Change in blood platelet count [BVI3295]   • Cirrhosis of liver (CMS/HCC) [K74.60]   • Rheumatoid arthritis  "(CMS/[facility]) [M06.9]   • Anxiety and depression [F41.9, F32.9]     Current Meds:  MAR Reviewed  Scheduled Meds:  buPROPion  mg Oral Daily   DULoxetine 90 mg Oral Daily   folic acid 1,000 mcg Oral Daily   insulin aspart 0-10 Units Subcutaneous 4x Daily With Meals & Nightly   insulin aspart 10 Units Subcutaneous TID With Meals   insulin detemir 10 Units Subcutaneous Nightly   insulin detemir 40 Units Subcutaneous QAM   levETIRAcetam 500 mg Oral BID   predniSONE 10 mg Oral Daily   pregabalin 75 mg Oral BID   rifaximin 550 mg Oral BID   sodium chloride 3 mL Intravenous Q12H   spironolactone 100 mg Oral Daily   sucralfate 1 g Oral BID   trimethobenzamide 300 mg Oral TID     Continuous Infusions:  pantoprazole 8 mg/hr Last Rate: 8 mg/hr (10/04/18 0916)   sodium chloride 75 mL/hr Last Rate: 75 mL/hr (10/04/18 1428)     PRN Meds:.•  acetaminophen  •  ALPRAZolam  •  cyclobenzaprine  •  dextrose  •  dextrose  •  glucagon (human recombinant)  •  HYDROmorphone  •  hydrOXYzine  •  nitroglycerin  •  ondansetron **OR** ondansetron ODT **OR** ondansetron  •  oxyCODONE  •  potassium chloride  •  promethazine  •  promethazine  •  sodium chloride  •  Insert peripheral IV **AND** sodium chloride  Allergies:  Allergies   Allergen Reactions   • Albuterol Anaphylaxis   • Lactulose Nausea And Vomiting and Other (See Comments)     Severe abdominal pain   • Tramadol Other (See Comments)     Per pt causes her \"palsy, meaning shaking and tremors\" and gi upset, nausea   • Quinine Derivatives Nausea And Vomiting       Intake/Output:     Intake/Output Summary (Last 24 hours) at 10/04/18 1750  Last data filed at 10/04/18 1410   Gross per 24 hour   Intake                0 ml   Output              900 ml   Net             -900 ml     New allergies/reactions:  None    Physical Exam:  Vitals:   Temp (24hrs), Av.2 °F (36.8 °C), Min:98 °F (36.7 °C), Max:98.5 °F (36.9 °C)    Temp:  [98 °F (36.7 °C)-98.5 °F (36.9 °C)] 98.2 °F (36.8 °C)  Heart Rate: " " [107-109] 109  Resp:  [18-19] 18  BP: (125-140)/(70-83) 140/71  /71 (BP Location: Left arm, Patient Position: Lying)   Pulse 109   Temp 98.2 °F (36.8 °C) (Oral)   Resp 18   Ht 168.9 cm (66.5\")   Wt 87.7 kg (193 lb 6.4 oz)   SpO2 98%   BMI 30.75 kg/m²     Exam:  NAD  PERRLA. Sclerae and conjunctivae normal  HENT: external inspection normal.   Alert, oriented, normal affect.         DATA:  Radiology and Labs:   Recent Results (from the past 24 hour(s))   POC Glucose Once    Collection Time: 10/03/18  8:11 PM   Result Value Ref Range    Glucose 305 (H) 70 - 130 mg/dL   POC Glucose Once    Collection Time: 10/04/18 12:32 AM   Result Value Ref Range    Glucose 169 (H) 70 - 130 mg/dL   POC Glucose Once    Collection Time: 10/04/18  4:32 AM   Result Value Ref Range    Glucose 158 (H) 70 - 130 mg/dL   POC Glucose Once    Collection Time: 10/04/18  6:18 AM   Result Value Ref Range    Glucose 166 (H) 70 - 130 mg/dL   POC Glucose Once    Collection Time: 10/04/18  8:02 AM   Result Value Ref Range    Glucose 186 (H) 70 - 130 mg/dL   Comprehensive Metabolic Panel    Collection Time: 10/04/18  9:00 AM   Result Value Ref Range    Glucose 204 (H) 65 - 99 mg/dL    BUN 9 6 - 20 mg/dL    Creatinine 0.80 0.57 - 1.00 mg/dL    Sodium 139 136 - 145 mmol/L    Potassium 4.1 3.5 - 5.2 mmol/L    Chloride 107 98 - 107 mmol/L    CO2 17.9 (L) 22.0 - 29.0 mmol/L    Calcium 8.3 (L) 8.6 - 10.5 mg/dL    Total Protein 6.2 6.0 - 8.5 g/dL    Albumin 3.30 (L) 3.50 - 5.20 g/dL    ALT (SGPT) 23 1 - 33 U/L    AST (SGOT) 30 1 - 32 U/L    Alkaline Phosphatase 177 (H) 39 - 117 U/L    Total Bilirubin 1.5 (H) 0.1 - 1.2 mg/dL    eGFR Non African Amer 74 >60 mL/min/1.73    Globulin 2.9 gm/dL    A/G Ratio 1.1 g/dL    BUN/Creatinine Ratio 11.3 7.0 - 25.0    Anion Gap 14.1 mmol/L   Lactic Acid, Plasma    Collection Time: 10/04/18  9:00 AM   Result Value Ref Range    Lactate 1.8 0.5 - 2.0 mmol/L   CBC Auto Differential    Collection Time: 10/04/18  9:00 " AM   Result Value Ref Range    WBC 2.02 (L) 4.50 - 10.70 10*3/mm3    RBC 3.82 (L) 3.90 - 5.20 10*6/mm3    Hemoglobin 10.5 (L) 11.9 - 15.5 g/dL    Hematocrit 32.6 (L) 35.6 - 45.5 %    MCV 85.3 80.5 - 98.2 fL    MCH 27.5 26.9 - 32.0 pg    MCHC 32.2 (L) 32.4 - 36.3 g/dL    RDW 16.7 (H) 11.7 - 13.0 %    RDW-SD 52.3 37.0 - 54.0 fl    MPV 10.0 6.0 - 12.0 fL    Platelets 109 (L) 140 - 500 10*3/mm3    Neutrophil % 50.5 42.7 - 76.0 %    Lymphocyte % 27.7 19.6 - 45.3 %    Monocyte % 16.8 (H) 5.0 - 12.0 %    Eosinophil % 4.0 0.3 - 6.2 %    Basophil % 1.0 0.0 - 1.5 %    Immature Grans % 0.5 0.0 - 0.5 %    Neutrophils, Absolute 1.02 (L) 1.90 - 8.10 10*3/mm3    Lymphocytes, Absolute 0.56 (L) 0.90 - 4.80 10*3/mm3    Monocytes, Absolute 0.34 0.20 - 1.20 10*3/mm3    Eosinophils, Absolute 0.08 0.00 - 0.70 10*3/mm3    Basophils, Absolute 0.02 0.00 - 0.20 10*3/mm3    Immature Grans, Absolute 0.01 0.00 - 0.03 10*3/mm3   Troponin    Collection Time: 10/04/18  9:00 AM   Result Value Ref Range    Troponin T <0.010 0.000 - 0.030 ng/mL   POC Glucose Once    Collection Time: 10/04/18 11:57 AM   Result Value Ref Range    Glucose 195 (H) 70 - 130 mg/dL   POC Glucose Once    Collection Time: 10/04/18  4:41 PM   Result Value Ref Range    Glucose 223 (H) 70 - 130 mg/dL       Assessment:   Problem List:   Active Problems:    Cirrhosis of liver (CMS/HCC)    Rheumatoid arthritis (CMS/HCC)    Anxiety and depression    Type 2 diabetes mellitus, uncontrolled, with neuropathy (CMS/HCC)    Abdominal pain    Gastroparesis    Intractable vomiting with nausea    Both the MRI and the CT scan make mention of what appears to be an extensive inflammatory process involving the second portion of the duodenum.  Given the patient's symptoms, I think we need to look into that.    Plan:   EGD tomorrow.      Rich Coleman MD  [facility]/[place]  10/4/2018

## 2018-10-04 NOTE — PLAN OF CARE
Problem: Patient Care Overview  Goal: Plan of Care Review  Outcome: Ongoing (interventions implemented as appropriate)   10/04/18 9377   OTHER   Outcome Summary VSS. patient reports pain and nausea. medicated per md order. pt complained of constipation and fleets enema given. VSS. IV fluids and protonix gtt remain. Will continue to monitor.    Coping/Psychosocial   Plan of Care Reviewed With patient       Problem: Fall Risk (Adult)  Goal: Absence of Fall  Outcome: Ongoing (interventions implemented as appropriate)      Problem: Nausea/Vomiting (Adult)  Goal: Symptom Relief  Outcome: Ongoing (interventions implemented as appropriate)

## 2018-10-04 NOTE — PLAN OF CARE
Problem: Patient Care Overview  Goal: Plan of Care Review  Outcome: Ongoing (interventions implemented as appropriate)   10/04/18 0030 10/04/18 0357   Plan of Care Review   Progress --  improving   OTHER   Outcome Summary --  VSS, patients pain, nausea, anxiety, and itching much better controlled now with prn meds. Pt with clear liquid diet, tolerating well. Continue IV fluids and protonix gtt. Will continue to closely monitor.   Coping/Psychosocial   Plan of Care Reviewed With patient --      Goal: Individualization and Mutuality  Outcome: Ongoing (interventions implemented as appropriate)    Goal: Discharge Needs Assessment  Outcome: Ongoing (interventions implemented as appropriate)    Goal: Interprofessional Rounds/Family Conf  Outcome: Ongoing (interventions implemented as appropriate)      Problem: Fall Risk (Adult)  Goal: Identify Related Risk Factors and Signs and Symptoms  Outcome: Ongoing (interventions implemented as appropriate)    Goal: Absence of Fall  Outcome: Ongoing (interventions implemented as appropriate)      Problem: Nausea/Vomiting (Adult)  Goal: Identify Related Risk Factors and Signs and Symptoms  Outcome: Ongoing (interventions implemented as appropriate)    Goal: Symptom Relief  Outcome: Ongoing (interventions implemented as appropriate)    Goal: Adequate Hydration  Outcome: Ongoing (interventions implemented as appropriate)

## 2018-10-04 NOTE — PROGRESS NOTES
58 y.o.   LOS: 1 day   Patient Care Team:  Blanca Pitts MD as PCP - General (Internal Medicine)  Blanca Pitts MD as PCP - Claims Attributed  Sukhdeep Mcdowell MD as Consulting Physician (Hematology and Oncology)  Blanca Pitts MD as Referring Physician (Internal Medicine)  Rich Coleman MD as Consulting Physician (Gastroenterology)  Merary Burnett MD as Consulting Physician (Endocrinology)    Chief Complaint:     Chief Complaint   Patient presents with   • Vomiting   • Nausea   • Chest Pain   • Shortness of Breath       Subjective   Blood sugars are much better since admission.  She is still ON clear liquid diet.  She still has some nausea, no vomiting episodes.  Symptoms are better since admission.  She continues to have some shortness of breath.    Interval History:    Review of Systems:   Review of Systems   Constitutional: Positive for appetite change and fatigue. Negative for fever.   Eyes: Negative for visual disturbance.   Respiratory: Positive for shortness of breath.    Cardiovascular: Negative for palpitations and leg swelling.   Gastrointestinal: Positive for abdominal pain and nausea. Negative for vomiting.   Endocrine: Negative for polydipsia and polyuria.   Musculoskeletal: Negative for joint swelling and neck pain.   Skin: Negative for rash.   Neurological: Positive for weakness and numbness.   Psychiatric/Behavioral: Negative for behavioral problems.     Objective     Vital Signs   Temp:  [98 °F (36.7 °C)-98.5 °F (36.9 °C)] 98 °F (36.7 °C)  Heart Rate:  [107-120] 109  Resp:  [18-22] 18  BP: (125-156)/(70-90) 125/70    Physical Exam:  Physical Exam   Constitutional: She is oriented to person, place, and time. She appears well-nourished.   HENT:   Head: Normocephalic and atraumatic.   Eyes: Conjunctivae and EOM are normal. No scleral icterus.   Neck: Normal range of motion. Neck supple. No thyromegaly present.   Cardiovascular: Normal rate and normal heart sounds.  Exam reveals no friction  rub.    No murmur heard.  Pulmonary/Chest: Effort normal and breath sounds normal. No stridor. She has no wheezes. She has no rales.   Abdominal: Soft. Bowel sounds are normal. She exhibits distension. There is tenderness.   Musculoskeletal: She exhibits no edema or tenderness.   Lymphadenopathy:     She has no cervical adenopathy.   Neurological: She is alert and oriented to person, place, and time.   Skin: Skin is warm and dry. She is not diaphoretic.   Psychiatric: She has a normal mood and affect.   Vitals reviewed.  Results Review:     I reviewed the patient's new clinical results and summarized them in subjective and in plan.      Glucose   Date/Time Value Ref Range Status   10/04/2018 0900 204 (H) 65 - 99 mg/dL Final   10/03/2018 0342 158 (H) 65 - 99 mg/dL Final   10/02/2018 2332 311 (H) 65 - 99 mg/dL Final     Lab Results (last 24 hours)     Procedure Component Value Units Date/Time    Comprehensive Metabolic Panel [[id number]]  (Abnormal) Collected:  10/04/18 0900    Specimen:  Blood Updated:  10/04/18 1004     Glucose 204 (H) mg/dL      BUN 9 mg/dL      Creatinine 0.80 mg/dL      Sodium 139 mmol/L      Potassium 4.1 mmol/L      Chloride 107 mmol/L      CO2 17.9 (L) mmol/L      Calcium 8.3 (L) mg/dL      Total Protein 6.2 g/dL      Albumin 3.30 (L) g/dL      ALT (SGPT) 23 U/L      AST (SGOT) 30 U/L      Alkaline Phosphatase 177 (H) U/L      Total Bilirubin 1.5 (H) mg/dL      eGFR Non African Amer 74 mL/min/1.73      Globulin 2.9 gm/dL      A/G Ratio 1.1 g/dL      BUN/Creatinine Ratio 11.3     Anion Gap 14.1 mmol/L     Troponin [[id number]]  (Normal) Collected:  10/04/18 0900    Specimen:  Blood Updated:  10/04/18 1001     Troponin T <0.010 ng/mL     Narrative:       Troponin T Reference Ranges:  Less than 0.03 ng/mL:    Negative for AMI  0.03 to 0.09 ng/mL:      Indeterminant for AMI  Greater than 0.09 ng/mL: Positive for AMI    Lactic Acid, Plasma [[id number]]  (Normal) Collected:  10/04/18 0900    Specimen:   Blood Updated:  10/04/18 0954     Lactate 1.8 mmol/L     CBC & Differential [[id number]] Collected:  10/04/18 0900    Specimen:  Blood Updated:  10/04/18 0947    Narrative:       The following orders were created for panel order CBC & Differential.  Procedure                               Abnormality         Status                     ---------                               -----------         ------                     CBC Auto Differential[[id number]]        Abnormal            Final result                 Please view results for these tests on the individual orders.    CBC Auto Differential [[id number]]  (Abnormal) Collected:  10/04/18 0900    Specimen:  Blood Updated:  10/04/18 0947     WBC 2.02 (L) 10*3/mm3      RBC 3.82 (L) 10*6/mm3      Hemoglobin 10.5 (L) g/dL      Hematocrit 32.6 (L) %      MCV 85.3 fL      MCH 27.5 pg      MCHC 32.2 (L) g/dL      RDW 16.7 (H) %      RDW-SD 52.3 fl      MPV 10.0 fL      Platelets 109 (L) 10*3/mm3      Neutrophil % 50.5 %      Lymphocyte % 27.7 %      Monocyte % 16.8 (H) %      Eosinophil % 4.0 %      Basophil % 1.0 %      Immature Grans % 0.5 %      Neutrophils, Absolute 1.02 (L) 10*3/mm3      Lymphocytes, Absolute 0.56 (L) 10*3/mm3      Monocytes, Absolute 0.34 10*3/mm3      Eosinophils, Absolute 0.08 10*3/mm3      Basophils, Absolute 0.02 10*3/mm3      Immature Grans, Absolute 0.01 10*3/mm3     POC Glucose Once [[id number]]  (Abnormal) Collected:  10/04/18 0802    Specimen:  Blood Updated:  10/04/18 0803     Glucose 186 (H) mg/dL     Narrative:       Meter: [id number] : [id number] Lewis Latorra NA    POC Glucose Once [[id number]]  (Abnormal) Collected:  10/04/18 0618    Specimen:  Blood Updated:  10/04/18 0620     Glucose 166 (H) mg/dL     Narrative:       Meter: [id number] : [id number] Duratovic Teena NA    POC Glucose Once [[id number]]  (Abnormal) Collected:  10/04/18 0432    Specimen:  Blood Updated:  10/04/18 0444     Glucose 158 (H) mg/dL     Narrative:        Meter: [id number] : [id number] Mayur Keily TATYANA    AFP Tumor Marker [[id number]] Collected:  10/04/18 0417    Specimen:  Blood Updated:  10/04/18 0440    POC Glucose Once [[id number]]  (Abnormal) Collected:  10/04/18 0032    Specimen:  Blood Updated:  10/04/18 0034     Glucose 169 (H) mg/dL     Narrative:       Meter: [id number] : [id number] Mayur Mcihaud NA    POC Glucose Once [[id number]]  (Abnormal) Collected:  10/03/18 2011    Specimen:  Blood Updated:  10/03/18 2014     Glucose 305 (H) mg/dL     Narrative:       Meter: [id number] : [id number] Mayur Pengbeth NA    POC Glucose Once [[id number]]  (Abnormal) Collected:  10/03/18 1640    Specimen:  Blood Updated:  10/03/18 1644     Glucose 345 (H) mg/dL     Narrative:       Meter: [id number] : [id number] Manisha JOE        Imaging Results (last 24 hours)     Procedure Component Value Units Date/Time    MRI abdomen w wo contrast mrcp [[id number]] Collected:  10/04/18 0828     Updated:  10/04/18 0828    Narrative:       MRI OF THE LIVER WITH AND WITHOUT CONTRAST, MRCP     HISTORY: Possible mass in the right lobe of liver. Cirrhosis.     TECHNIQUE: MRI of the abdomen was performed utilizing a dedicated liver  protocol. Axial 2-D FIESTA, in and out of phase FSPGR, axial  fat-saturated T2 imaging was acquired. Axial thin section precontrast  and dynamic enhanced postcontrast T1 weighted LAVA imaging was acquired.  Thick slab coronal MRCP imaging was acquired in multiple obliquities  through the biliary tree. Coronal thin section fat saturated T2 and 3-D  MRCP imaging was acquired.      COMPARISON: CT abdomen and pelvis without contrast from 10/03/2018.     FINDINGS: Cirrhotic morphology of liver is present. The spleen remains  enlarged measuring 15.3 cm craniocaudal and approximately 18.9 cm in AP  dimension. The signal intensity of the liver is normal. No suspicious  hepatic lesions are seen on precontrast T1 and T2-weighted images. There  is no  intrahepatic biliary dilatation. Following administration of  contrast the liver demonstrates heterogenous perfusion on the arterial  and venous phases. There is a wedge-shaped area of hyperperfusion seen  within the posterior superior right hepatic lobe, segment 7 that becomes  isointense to the rest of the liver on the venous phase and likely  represents a transient hepatic perfusion anomaly. No suspicious  arterially enhancing lesion is identified. The portal vein and  intrahepatic branches are patent. The splenoportal confluence is patent.  A T1 and T2 hyperintense 9 mm lesion seen within the superior pole of  the spleen does not demonstrate any postcontrast enhancement and may  represent a complex cyst. There is an additional simple cyst seen more  medially. Numerous portosystemic collaterals are seen within the omentum  and the mesentery. The visualized portions of the kidneys are  unremarkable. There is mild circumferential wall thickening with some  mucosal edema involving the 2nd and 3rd portion of the duodenum with  surrounding periduodenal fluid.       Impression:       1. Hepatic cirrhosis with sequela of portal hypertension to include  splenomegaly and portosystemic collaterals.  2. Heterogenous perfusion of the liver without suspicious focal lesion  to suggest hepatocellular carcinoma.  3. Wall thickening with some mucosal edema seen within the 2nd and 3rd  portion of the gallbladder with periduodenal fluid. These findings are  most suggestive of duodenitis.       CT Abdomen Pelvis Without Contrast [[id number]] Collected:  10/03/18 1022     Updated:  10/03/18 1213    Narrative:       CT ABDOMEN AND PELVIS WITHOUT CONTRAST     HISTORY: Abdominal pain and distention.     TECHNIQUE: Axial CT images of the abdomen and pelvis were obtained  without administration of intravenous contrast. The patient was not  given oral contrast. Coronal and sagittal reformats were obtained.     COMPARISON: 06/14/2018.      FINDINGS: The liver demonstrates a cirrhotic morphology. There is a  hypoattenuating subcapsular region seen within the right hepatic lobe,  image 57 measuring approximately 3.8 x 2.7 cm. This is not characterized  on the current noncontrast study. May represent an area of mild  heterogeneity due to underlying fibrosis and steatosis however a mass  cannot be excluded. The spleen is enlarged measuring approximately 17.6  cm in AP dimension. The gallbladder is surgically absent. Portal  systemic collaterals are seen in the perigastric and perisplenic region.  Multiple venous collaterals are seen within the mesentery and the  omentum. The pancreas is normal without ductal dilatation. Bilateral  adrenal glands and kidneys are unremarkable. The urinary bladder is  minimally distended limiting evaluation. The uterus is not identified  and is likely surgically absent. The small and large bowel loops  demonstrate normal caliber. There is mild stranding within the mesentery  likely related to portal venous congestion. There is additionally  circumferential wall thickening involving the 2nd and 3rd portion of the  duodenum that may represent duodenitis.       Impression:       1. Cirrhosis with sequela of portal hypertension to include portal  systemic collaterals and splenomegaly. No ascites is identified.  2. Diffuse mesenteric stranding likely related to venous congestion.  3. Ill-defined heterogenous predominantly hypoattenuating lesion within  the inferior right hepatic lobe as discussed above. This finding is not  characterized on the current noncontrast study. This may be secondary to  heterogenous/geographic steatosis and fibrosis however a small mass  lesion cannot be excluded. Follow-up with MR or CT liver mass protocol  is recommended.  4. Diffuse wall thickening and stranding surrounding the 2nd and 3rd  portion of the duodenum that may represent duodenitis.     Radiation dose reduction techniques were utilized,  including automated  exposure control and exposure modulation based on body size.     This report was finalized on 10/3/2018 12:10 PM by Dr. Rose Lin M.D.             Medication Review: DONE      Current Facility-Administered Medications:   •  acetaminophen (TYLENOL) tablet 650 mg, 650 mg, Oral, Q4H PRN, Karlene Del Cid, APRN  •  ALPRAZolam (XANAX) tablet 0.5 mg, 0.5 mg, Oral, BID PRN, Jose Almanza MD, 0.5 mg at 10/03/18 2059  •  buPROPion XL (WELLBUTRIN XL) 24 hr tablet 150 mg, 150 mg, Oral, Daily, Jose Almanza MD, 150 mg at 10/04/18 0917  •  cyclobenzaprine (FLEXERIL) tablet 5 mg, 5 mg, Oral, TID PRN, Jose Almanza MD  •  dextrose (D50W) 25 g/ 50mL Intravenous Solution 25 g, 25 g, Intravenous, Q15 Min PRN, Karlene Del Cid APRN  •  dextrose (GLUTOSE) oral gel 15 g, 15 g, Oral, Q15 Min PRN, Karlene Del Cid APRN  •  DULoxetine (CYMBALTA) DR capsule 90 mg, 90 mg, Oral, Daily, Jose Almanza MD, 90 mg at 10/04/18 0917  •  folic acid (FOLVITE) tablet 1,000 mcg, 1,000 mcg, Oral, Daily, Jose Almanza MD, 1,000 mcg at 10/04/18 0917  •  glucagon (human recombinant) (GLUCAGEN DIAGNOSTIC) injection 1 mg, 1 mg, Subcutaneous, PRN, Karlene Del Cid, APRN  •  HYDROmorphone (DILAUDID) injection 0.5 mg, 0.5 mg, Intravenous, Q2H PRN, Jose Almanza MD, 0.5 mg at 10/04/18 0928  •  hydrOXYzine (ATARAX) tablet 50 mg, 50 mg, Oral, TID PRN, Jose Almanza MD, 50 mg at 10/03/18 2059  •  Influenza Vac Subunit Quad (FLUCELVAX) injection 0.5 mL, 0.5 mL, Intramuscular, Once, Kervin Jaffe MD  •  insulin aspart (novoLOG) injection 0-10 Units, 0-10 Units, Subcutaneous, 4x Daily With Meals & Nightly, Merary Burnett MD, 2 Units at 10/04/18 0916  •  insulin aspart (novoLOG) injection 10 Units, 10 Units, Subcutaneous, TID With Meals, Merary Burnett MD, 10 Units at 10/04/18 0916  •  insulin detemir (LEVEMIR) injection 40 Units, 40 Units, Subcutaneous, QAM, Merary Burnett MD, 40 Units at 10/04/18 0635  •   levETIRAcetam (KEPPRA) tablet 500 mg, 500 mg, Oral, BID, Jose Almanza MD, 500 mg at 10/04/18 0917  •  nitroglycerin (NITROSTAT) SL tablet 0.4 mg, 0.4 mg, Sublingual, Q5 Min PRN, Karlene Del Cid, APRN  •  ondansetron (ZOFRAN) tablet 4 mg, 4 mg, Oral, Q6H PRN **OR** ondansetron ODT (ZOFRAN-ODT) disintegrating tablet 4 mg, 4 mg, Oral, Q6H PRN **OR** ondansetron (ZOFRAN) injection 4 mg, 4 mg, Intravenous, Q6H PRN, Karlene Del Cid APRN, 4 mg at 10/03/18 0908  •  oxyCODONE (ROXICODONE) immediate release tablet 10 mg, 10 mg, Oral, Q4H PRN, Jose Almanza MD, 10 mg at 10/03/18 1040  •  pantoprazole (PROTONIX) 40 mg/100 mL (0.4 mg/mL) in 0.9% NS IVPB, 8 mg/hr, Intravenous, Continuous, Jose Almanza MD, Last Rate: 20 mL/hr at 10/04/18 0916, 8 mg/hr at 10/04/18 0916  •  potassium chloride 10 mEq in 100 mL IVPB, 10 mEq, Intravenous, Q1H PRN, Jose Almanza MD  •  predniSONE (DELTASONE) tablet 10 mg, 10 mg, Oral, Daily, Jose Almanza MD, 10 mg at 10/04/18 0917  •  pregabalin (LYRICA) capsule 75 mg, 75 mg, Oral, BID, Jose Almanza MD, 75 mg at 10/04/18 0918  •  promethazine (PHENERGAN) injection 12.5 mg, 12.5 mg, Intravenous, Q6H PRN, Karlene Del Cid APRN, 12.5 mg at 10/03/18 0523  •  promethazine (PHENERGAN) tablet 25 mg, 25 mg, Oral, Q6H PRN, Jose Almanza MD, 25 mg at 10/04/18 0928  •  rifaximin (XIFAXAN) tablet 550 mg, 550 mg, Oral, BID, Jose Almanza MD, 550 mg at 10/04/18 0917  •  sodium chloride 0.9 % flush 1-10 mL, 1-10 mL, Intravenous, PRN, Karlene Del Cid, APRN  •  Insert peripheral IV, , , Once **AND** sodium chloride 0.9 % flush 10 mL, 10 mL, Intravenous, PRN, Reynaldo Albright PA  •  sodium chloride 0.9 % flush 3 mL, 3 mL, Intravenous, Q12H, Karlene Del Cid, APRN, 3 mL at 10/04/18 0928  •  sodium chloride 0.9 % infusion, 75 mL/hr, Intravenous, Continuous, Karlene Del Cid, APRN, Last Rate: 75 mL/hr at 10/03/18 0429, 75 mL/hr at 10/03/18 0429  •  spironolactone (ALDACTONE) tablet 100  "mg, 100 mg, Oral, Daily, Jose Almanza MD, 100 mg at 10/04/18 0917  •  sucralfate (CARAFATE) tablet 1 g, 1 g, Oral, BID, Jose Almanza MD, 1 g at 10/04/18 0917  •  trimethobenzamide (TIGAN) capsule 300 mg, 300 mg, Oral, TID, Jose Almanza MD, 300 mg at 10/04/18 0917    Assessment/Plan     Active Hospital Problems    Diagnosis Date Noted   • Intractable vomiting with nausea [R11.2] 10/03/2018   • Gastroparesis [K31.84] 10/17/2017   • Abdominal pain [R10.9] 02/14/2017   • Type 2 diabetes mellitus, uncontrolled, with neuropathy (CMS/HCC) [E11.40, E11.65] 03/15/2016   • Cirrhosis of liver (CMS/HCC) [K74.60] 03/08/2016   • Rheumatoid arthritis (CMS/HCC) [M06.9] 03/08/2016   • Anxiety and depression [F41.9, F32.9] 03/08/2016      Resolved Hospital Problems    Diagnosis Date Noted Date Resolved   No resolved problems to display.     Type 2 diabetes mellitus-significantly uncontrolled  Continue levemir 40 units in the morning  Continue NovoLog 10 units with each meal along with holding parameters.  Will cover with NovoLog sliding scale 3 times a day before meals and at bedtime  Will start levemir 10 units at bedtime.  Patient is currently on prednisone 10 mg oral daily.    Currently on prednisone 10 mg oral daily for ITP.    Pending TSH levels.     Discussed plan with Nurse.     Merary Burnett MD.  10/04/18  11:37 AM      EM[facility] Dragon / transcription disclaimer:    \"Dictated utilizing Dragon dictation\".   "

## 2018-10-04 NOTE — PROGRESS NOTES
[facility]     LOS: 1 day     Subjective:    HA, back ache, left side hurting more    Some chest tightness, x 2 days, better    Breathing is better      Objective:    Vital Signs:  Temp:  [98 °F (36.7 °C)-98.5 °F (36.9 °C)] 98 °F (36.7 °C)  Heart Rate:  [107-120] 109  Resp:  [18-22] 18  BP: (125-156)/(70-90) 125/70    SpO2:  [95 %-100 %] 95 %  on   ;   Device (Oxygen Therapy): room air  Body mass index is 30.75 kg/m².    Physical Exam   Constitutional: She appears well-developed and well-nourished.   HENT:   Head: Normocephalic and atraumatic.   Cardiovascular: Normal rate and regular rhythm.    No murmur heard.  Pulmonary/Chest: Effort normal and breath sounds normal.   Abdominal: Soft. Bowel sounds are normal. She exhibits no distension. There is tenderness.   Neurological: She is alert.   Skin: Skin is warm and dry.       Results Review:    Glucose   Date Value Ref Range Status   10/03/2018 158 (H) 65 - 99 mg/dL Final   10/02/2018 311 (H) 65 - 99 mg/dL Final       Results from last 7 days  Lab Units 10/03/18  0342   WBC 10*3/mm3 3.36*   HEMOGLOBIN g/dL 11.4*   HEMATOCRIT % 35.9   PLATELETS 10*3/mm3 134*       Results from last 7 days  Lab Units 10/03/18  0342   SODIUM mmol/L 144   POTASSIUM mmol/L 3.4*   CHLORIDE mmol/L 106   CO2 mmol/L 24.2   BUN mg/dL 5*   CREATININE mg/dL 0.78   CALCIUM mg/dL 9.5   BILIRUBIN mg/dL 1.3*   ALK PHOS U/L 207*   ALT (SGPT) U/L 30   AST (SGOT) U/L 38*   GLUCOSE mg/dL 158*       Results from last 7 days  Lab Units 10/02/18  2332   INR  1.24*   APTT seconds 30.2           Results from last 7 days  Lab Units 10/02/18  2332   TROPONIN T ng/mL <0.010     Cultures:       I have reviewed daily medications and changes in CPOE    Scheduled meds    buPROPion  mg Oral Daily   DULoxetine 90 mg Oral Daily   folic acid 1,000 mcg Oral Daily   influenza vaccine 0.5 mL Intramuscular Once   insulin aspart 0-10 Units Subcutaneous 4x Daily With Meals & Nightly    insulin aspart 10 Units Subcutaneous TID With Meals   insulin detemir 40 Units Subcutaneous QAM   levETIRAcetam 500 mg Oral BID   predniSONE 10 mg Oral Daily   pregabalin 75 mg Oral BID   rifaximin 550 mg Oral BID   sodium chloride 3 mL Intravenous Q12H   spironolactone 100 mg Oral Daily   sucralfate 1 g Oral BID   trimethobenzamide 300 mg Oral TID         pantoprazole 8 mg/hr Last Rate: 8 mg/hr (10/04/18 0259)   sodium chloride 75 mL/hr Last Rate: 75 mL/hr (10/03/18 0889)     PRN meds  •  acetaminophen  •  ALPRAZolam  •  cyclobenzaprine  •  dextrose  •  dextrose  •  glucagon (human recombinant)  •  HYDROmorphone  •  hydrOXYzine  •  nitroglycerin  •  ondansetron **OR** ondansetron ODT **OR** ondansetron  •  oxyCODONE  •  potassium chloride  •  promethazine  •  promethazine  •  sodium chloride  •  Insert peripheral IV **AND** sodium chloride      Active Problems:    Abdominal pain    Cirrhosis of liver (CMS/HCC)    Type 2 diabetes mellitus, uncontrolled, with neuropathy (CMS/HCC)    Gastroparesis    Rheumatoid arthritis (CMS/HCC)    Anxiety and depression    Intractable vomiting with nausea        Assessment/Plan:  Duodenitis - empiric ppi gtt    Prolonged qtc- better on the monitor    Abdominal pain      Cirrhosis of liver (CMS/HCC)      Type 2 diabetes mellitus, uncontrolled, with neuropathy (CMS/HCC)      Gastroparesis      Rheumatoid arthritis (CMS/HCC)      Anxiety and depression      Intractable vomiting with nausea-last v in er  -no bm  -some nausea  -took some pain meds last night    dispo-1-2 days    Jose Almanza MD  10/04/18  8:44 AM

## 2018-10-05 ENCOUNTER — ANESTHESIA (OUTPATIENT)
Dept: GASTROENTEROLOGY | Facility: HOSPITAL | Age: 58
End: 2018-10-05

## 2018-10-05 ENCOUNTER — RESULTS ENCOUNTER (OUTPATIENT)
Dept: ONCOLOGY | Facility: CLINIC | Age: 58
End: 2018-10-05

## 2018-10-05 ENCOUNTER — ANESTHESIA EVENT (OUTPATIENT)
Dept: GASTROENTEROLOGY | Facility: HOSPITAL | Age: 58
End: 2018-10-05

## 2018-10-05 DIAGNOSIS — D69.3 ACUTE ITP (HCC): ICD-10-CM

## 2018-10-05 DIAGNOSIS — D61.818 PANCYTOPENIA (HCC): ICD-10-CM

## 2018-10-05 DIAGNOSIS — K74.60 CIRRHOSIS OF LIVER WITHOUT ASCITES, UNSPECIFIED HEPATIC CIRRHOSIS TYPE (HCC): ICD-10-CM

## 2018-10-05 DIAGNOSIS — D73.1 HYPERSPLENISM: ICD-10-CM

## 2018-10-05 LAB
AFP-TM SERPL-MCNC: 2.1 NG/ML (ref 0–8.3)
ALBUMIN SERPL-MCNC: 3.3 G/DL (ref 3.5–5.2)
ALBUMIN/GLOB SERPL: 1.2 G/DL
ALP SERPL-CCNC: 168 U/L (ref 39–117)
ALT SERPL W P-5'-P-CCNC: 19 U/L (ref 1–33)
ANION GAP SERPL CALCULATED.3IONS-SCNC: 13.4 MMOL/L
AST SERPL-CCNC: 30 U/L (ref 1–32)
BASOPHILS # BLD AUTO: 0.02 10*3/MM3 (ref 0–0.2)
BASOPHILS NFR BLD AUTO: 0.8 % (ref 0–1.5)
BILIRUB SERPL-MCNC: 1.2 MG/DL (ref 0.1–1.2)
BUN BLD-MCNC: 7 MG/DL (ref 6–20)
BUN/CREAT SERPL: 10.9 (ref 7–25)
CALCIUM SPEC-SCNC: 8.3 MG/DL (ref 8.6–10.5)
CHLORIDE SERPL-SCNC: 106 MMOL/L (ref 98–107)
CO2 SERPL-SCNC: 18.6 MMOL/L (ref 22–29)
CREAT BLD-MCNC: 0.64 MG/DL (ref 0.57–1)
DEPRECATED RDW RBC AUTO: 50.2 FL (ref 37–54)
EOSINOPHIL # BLD AUTO: 0.08 10*3/MM3 (ref 0–0.7)
EOSINOPHIL NFR BLD AUTO: 3.3 % (ref 0.3–6.2)
ERYTHROCYTE [DISTWIDTH] IN BLOOD BY AUTOMATED COUNT: 16 % (ref 11.7–13)
GFR SERPL CREATININE-BSD FRML MDRD: 96 ML/MIN/1.73
GLOBULIN UR ELPH-MCNC: 2.7 GM/DL
GLUCOSE BLD-MCNC: 141 MG/DL (ref 65–99)
GLUCOSE BLDC GLUCOMTR-MCNC: 123 MG/DL (ref 70–130)
GLUCOSE BLDC GLUCOMTR-MCNC: 142 MG/DL (ref 70–130)
GLUCOSE BLDC GLUCOMTR-MCNC: 156 MG/DL (ref 70–130)
GLUCOSE BLDC GLUCOMTR-MCNC: 180 MG/DL (ref 70–130)
HCT VFR BLD AUTO: 33.2 % (ref 35.6–45.5)
HGB BLD-MCNC: 10.4 G/DL (ref 11.9–15.5)
IMM GRANULOCYTES # BLD: 0 10*3/MM3 (ref 0–0.03)
IMM GRANULOCYTES NFR BLD: 0 % (ref 0–0.5)
LYMPHOCYTES # BLD AUTO: 0.43 10*3/MM3 (ref 0.9–4.8)
LYMPHOCYTES NFR BLD AUTO: 17.9 % (ref 19.6–45.3)
MCH RBC QN AUTO: 26.9 PG (ref 26.9–32)
MCHC RBC AUTO-ENTMCNC: 31.3 G/DL (ref 32.4–36.3)
MCV RBC AUTO: 85.8 FL (ref 80.5–98.2)
MONOCYTES # BLD AUTO: 0.36 10*3/MM3 (ref 0.2–1.2)
MONOCYTES NFR BLD AUTO: 15 % (ref 5–12)
NEUTROPHILS # BLD AUTO: 1.51 10*3/MM3 (ref 1.9–8.1)
NEUTROPHILS NFR BLD AUTO: 63 % (ref 42.7–76)
PLATELET # BLD AUTO: 94 10*3/MM3 (ref 140–500)
PMV BLD AUTO: 9.8 FL (ref 6–12)
POTASSIUM BLD-SCNC: 3.7 MMOL/L (ref 3.5–5.2)
PROT SERPL-MCNC: 6 G/DL (ref 6–8.5)
RBC # BLD AUTO: 3.87 10*6/MM3 (ref 3.9–5.2)
SODIUM BLD-SCNC: 138 MMOL/L (ref 136–145)
WBC NRBC COR # BLD: 2.4 10*3/MM3 (ref 4.5–10.7)

## 2018-10-05 PROCEDURE — 25010000002 ONDANSETRON PER 1 MG: Performed by: NURSE PRACTITIONER

## 2018-10-05 PROCEDURE — 99232 SBSQ HOSP IP/OBS MODERATE 35: CPT | Performed by: INTERNAL MEDICINE

## 2018-10-05 PROCEDURE — 82962 GLUCOSE BLOOD TEST: CPT

## 2018-10-05 PROCEDURE — 85025 COMPLETE CBC W/AUTO DIFF WBC: CPT | Performed by: HOSPITALIST

## 2018-10-05 PROCEDURE — 25010000002 PROPOFOL 10 MG/ML EMULSION: Performed by: ANESTHESIOLOGY

## 2018-10-05 PROCEDURE — 63710000001 PROMETHAZINE PER 25 MG: Performed by: HOSPITALIST

## 2018-10-05 PROCEDURE — 63710000001 INSULIN ASPART PER 5 UNITS: Performed by: INTERNAL MEDICINE

## 2018-10-05 PROCEDURE — 25010000002 HYDROMORPHONE PER 4 MG: Performed by: HOSPITALIST

## 2018-10-05 PROCEDURE — 63710000001 PREDNISONE PER 5 MG: Performed by: HOSPITALIST

## 2018-10-05 PROCEDURE — 80053 COMPREHEN METABOLIC PANEL: CPT | Performed by: HOSPITALIST

## 2018-10-05 PROCEDURE — 63710000001 INSULIN DETEMIR PER 5 UNITS: Performed by: INTERNAL MEDICINE

## 2018-10-05 PROCEDURE — 0DJ08ZZ INSPECTION OF UPPER INTESTINAL TRACT, VIA NATURAL OR ARTIFICIAL OPENING ENDOSCOPIC: ICD-10-PCS | Performed by: INTERNAL MEDICINE

## 2018-10-05 RX ORDER — SODIUM CHLORIDE 0.9 % (FLUSH) 0.9 %
3 SYRINGE (ML) INJECTION AS NEEDED
Status: DISCONTINUED | OUTPATIENT
Start: 2018-10-05 | End: 2018-10-05

## 2018-10-05 RX ORDER — PROPOFOL 10 MG/ML
VIAL (ML) INTRAVENOUS AS NEEDED
Status: DISCONTINUED | OUTPATIENT
Start: 2018-10-05 | End: 2018-10-05 | Stop reason: SURG

## 2018-10-05 RX ORDER — SODIUM CHLORIDE, SODIUM LACTATE, POTASSIUM CHLORIDE, CALCIUM CHLORIDE 600; 310; 30; 20 MG/100ML; MG/100ML; MG/100ML; MG/100ML
1000 INJECTION, SOLUTION INTRAVENOUS CONTINUOUS
Status: ACTIVE | OUTPATIENT
Start: 2018-10-05 | End: 2018-10-07

## 2018-10-05 RX ORDER — LIDOCAINE HYDROCHLORIDE 10 MG/ML
0.5 INJECTION, SOLUTION INFILTRATION; PERINEURAL ONCE AS NEEDED
Status: DISCONTINUED | OUTPATIENT
Start: 2018-10-05 | End: 2018-10-05

## 2018-10-05 RX ADMIN — TRIMETHOBENZAMIDE HYDROCHLORIDE 300 MG: 300 CAPSULE ORAL at 15:18

## 2018-10-05 RX ADMIN — ONDANSETRON 4 MG: 2 INJECTION INTRAMUSCULAR; INTRAVENOUS at 13:50

## 2018-10-05 RX ADMIN — PROMETHAZINE HYDROCHLORIDE 25 MG: 25 TABLET ORAL at 08:46

## 2018-10-05 RX ADMIN — Medication 3 ML: at 09:03

## 2018-10-05 RX ADMIN — Medication 3 ML: at 21:30

## 2018-10-05 RX ADMIN — HYDROMORPHONE HYDROCHLORIDE 0.5 MG: 1 INJECTION, SOLUTION INTRAMUSCULAR; INTRAVENOUS; SUBCUTANEOUS at 09:03

## 2018-10-05 RX ADMIN — HYDROMORPHONE HYDROCHLORIDE 0.5 MG: 1 INJECTION, SOLUTION INTRAMUSCULAR; INTRAVENOUS; SUBCUTANEOUS at 21:21

## 2018-10-05 RX ADMIN — SUCRALFATE 1 G: 1 TABLET ORAL at 21:16

## 2018-10-05 RX ADMIN — ALPRAZOLAM 0.5 MG: 0.5 TABLET ORAL at 17:38

## 2018-10-05 RX ADMIN — SUCRALFATE 1 G: 1 TABLET ORAL at 08:46

## 2018-10-05 RX ADMIN — SODIUM CHLORIDE 8 MG/HR: 900 INJECTION INTRAVENOUS at 22:47

## 2018-10-05 RX ADMIN — SODIUM CHLORIDE 75 ML/HR: 9 INJECTION, SOLUTION INTRAVENOUS at 21:15

## 2018-10-05 RX ADMIN — FOLIC ACID 1000 MCG: 1 TABLET ORAL at 08:45

## 2018-10-05 RX ADMIN — INSULIN ASPART 2 UNITS: 100 INJECTION, SOLUTION INTRAVENOUS; SUBCUTANEOUS at 21:28

## 2018-10-05 RX ADMIN — PREDNISONE 10 MG: 10 TABLET ORAL at 08:46

## 2018-10-05 RX ADMIN — PREGABALIN 75 MG: 75 CAPSULE ORAL at 08:45

## 2018-10-05 RX ADMIN — SODIUM CHLORIDE, POTASSIUM CHLORIDE, SODIUM LACTATE AND CALCIUM CHLORIDE: 600; 310; 30; 20 INJECTION, SOLUTION INTRAVENOUS at 13:45

## 2018-10-05 RX ADMIN — TRIMETHOBENZAMIDE HYDROCHLORIDE 300 MG: 300 CAPSULE ORAL at 08:46

## 2018-10-05 RX ADMIN — PROMETHAZINE HYDROCHLORIDE 25 MG: 25 TABLET ORAL at 15:18

## 2018-10-05 RX ADMIN — LEVETIRACETAM 500 MG: 500 TABLET, FILM COATED ORAL at 08:46

## 2018-10-05 RX ADMIN — DULOXETINE HYDROCHLORIDE 90 MG: 60 CAPSULE, DELAYED RELEASE ORAL at 08:46

## 2018-10-05 RX ADMIN — PREGABALIN 75 MG: 75 CAPSULE ORAL at 21:16

## 2018-10-05 RX ADMIN — PROPOFOL 50 MG: 10 INJECTION, EMULSION INTRAVENOUS at 13:50

## 2018-10-05 RX ADMIN — RIFAXIMIN 550 MG: 550 TABLET ORAL at 08:46

## 2018-10-05 RX ADMIN — PROPOFOL 100 MG: 10 INJECTION, EMULSION INTRAVENOUS at 13:55

## 2018-10-05 RX ADMIN — INSULIN ASPART 10 UNITS: 100 INJECTION, SOLUTION INTRAVENOUS; SUBCUTANEOUS at 08:47

## 2018-10-05 RX ADMIN — INSULIN DETEMIR 40 UNITS: 100 INJECTION, SOLUTION SUBCUTANEOUS at 08:56

## 2018-10-05 RX ADMIN — SPIRONOLACTONE 100 MG: 100 TABLET ORAL at 08:45

## 2018-10-05 RX ADMIN — LEVETIRACETAM 500 MG: 500 TABLET, FILM COATED ORAL at 21:16

## 2018-10-05 RX ADMIN — HYDROMORPHONE HYDROCHLORIDE 0.5 MG: 1 INJECTION, SOLUTION INTRAMUSCULAR; INTRAVENOUS; SUBCUTANEOUS at 17:30

## 2018-10-05 RX ADMIN — TRIMETHOBENZAMIDE HYDROCHLORIDE 300 MG: 300 CAPSULE ORAL at 21:16

## 2018-10-05 RX ADMIN — INSULIN ASPART 2 UNITS: 100 INJECTION, SOLUTION INTRAVENOUS; SUBCUTANEOUS at 08:46

## 2018-10-05 RX ADMIN — OXYCODONE HYDROCHLORIDE 10 MG: 5 TABLET ORAL at 15:18

## 2018-10-05 RX ADMIN — RIFAXIMIN 550 MG: 550 TABLET ORAL at 21:16

## 2018-10-05 RX ADMIN — PROMETHAZINE HYDROCHLORIDE 25 MG: 25 TABLET ORAL at 01:37

## 2018-10-05 RX ADMIN — BUPROPION HYDROCHLORIDE 150 MG: 150 TABLET, FILM COATED, EXTENDED RELEASE ORAL at 08:46

## 2018-10-05 RX ADMIN — INSULIN DETEMIR 10 UNITS: 100 INJECTION, SOLUTION SUBCUTANEOUS at 21:28

## 2018-10-05 NOTE — PLAN OF CARE
Problem: Patient Care Overview  Goal: Plan of Care Review  Outcome: Ongoing (interventions implemented as appropriate)   10/05/18 0552   Plan of Care Review   Progress no change   OTHER   Outcome Summary C/o abdominal pain, pain meds x1. Phenegren x1. NPO at 9am for EGD. BM tonight. Urinary urgency, hard time urinating. Bladder scan 127. Will continue to follow.   Coping/Psychosocial   Plan of Care Reviewed With patient

## 2018-10-05 NOTE — CONSULTS
[facility] CBC GROUP INITIAL INPATIENT CONSULTATION NOTE    REASON FOR CONSULTATION:  Thrombocytopenia    RECORDS OBTAINED: Records of the patients history including those obtained from the referring provider were reviewed and summarized in detail.    HISTORY OF PRESENT ILLNESS:  Silvia Zabala is a 58 y.o. female who we are asked to see today in consultation for thrombocytopenia and neutropenia.   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    In May 2018 patient was admitted to this hospital with platelets of 3 with labs c/w diagnosis of ITP. She received 5 doses of IV IG and was discharged on prednisone 60 mg. She last saw Dr Mcdowell 9/10 and was on 10 mg prednisone with plans to continue until she saw her rheumatologist.    Admitted to the hospital 10/2 with adominal pain. Diagnosed with duodenitis but had EGD 10/5 which showed normal duodenum and minimal esophageal varices. Still with nausea. No obvious bleeding.     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   • Cirrhosis (CMS/HCC)    • Colon polyps    • Deafness    • Depression    • Diabetes mellitus (CMS/HCC)     Adult onset, insulin requiring   • Duodenal ulcer with hemorrhage    • Esophageal varices determined by endoscopy (CMS/HCC)    • Fibromyalgia    • Gallbladder disease    • Gastric varices    • Gastroparesis    • Glaucoma    • Granuloma annulare    • H/O B12 deficiency    • H/O Bleeding ulcer     And gastroesophageal varices   • H/O mixed connective tissue disorder    • Hemorrhoids    • Hiatal hernia    • History of transfusion    • Hx of being hospitalized     In [place] for GI bleeding from ulcer and varices   • Hyperlipidemia    • Migraine    • DUKES  (nonalcoholic steatohepatitis) 11/2016   • BRICE (obstructive sleep apnea)    • Pancreas divisum    • Pancytopenia (CMS/HCC)     Chronic, due to cirrhosis and hypersplenism   • Peptic ulcer disease     And esophageal varices   • PONV (postoperative nausea and vomiting)    • RA (rheumatoid arthritis) (CMS/HCC)    • Seizure disorder (CMS/HCC)     LAST ONE SEVERAL YEARS AGO   • Seizures (CMS/HCC)    • Systemic lupus (CMS/HCC)      Social History   Social History     Social History   • Marital status:      Spouse name: Jose   • Number of children: N/A   • Years of education: N/A     Occupational History   •  Disabled     Social History Main Topics   • Smoking status: Former Smoker     Packs/day: 0.00     Years: 0.00     Quit date: 12/17/2015   • Smokeless tobacco: Never Used   • Alcohol use No   • Drug use: No   • Sexual activity: Defer     Other Topics Concern   • Not on file     Social History Narrative    Moved to [place] from [place]. She is currently medically disabled.     Family History  Family History   Problem Relation Age of Onset   • Liver disease Other    • Depression Other    • Heart disease Other    • Hypertension Other    • Diabetes Other    • Breast cancer Other    • Brain cancer Other    • Uterine cancer Other    • Colon cancer Other    • Leukemia Other    • Colon cancer Maternal Aunt    • Hypertension Mother    • Diabetes type II Mother    • Rheum arthritis Mother    • Liver disease Mother         DUKES   • Heart disease Father    • Diabetes type II Father    • Diabetes type II Sister    • Lupus Sister    • Malig Hyperthermia Neg Hx      Medications    Current Facility-Administered Medications:   •  acetaminophen (TYLENOL) tablet 650 mg, 650 mg, Oral, Q4H PRN, Nehemias, Karlene E, APRN  •  ALPRAZolam (XANAX) tablet 0.5 mg, 0.5 mg, Oral, BID PRN, Jose Almanza MD, 0.5 mg at 10/06/18 0946  •  buPROPion XL (WELLBUTRIN XL) 24 hr tablet 150 mg, 150 mg, Oral, Daily, Jose Almanza MD, 150 mg at  10/06/18 0811  •  cyclobenzaprine (FLEXERIL) tablet 5 mg, 5 mg, Oral, TID PRN, Jose Almanza MD, 5 mg at 10/04/18 1137  •  dextrose (D50W) 25 g/ 50mL Intravenous Solution 25 g, 25 g, Intravenous, Q15 Min PRN, Karlene Del Cid, APRN  •  dextrose (GLUTOSE) oral gel 15 g, 15 g, Oral, Q15 Min PRN, Karlene Del Cid APRN  •  DULoxetine (CYMBALTA) DR capsule 90 mg, 90 mg, Oral, Daily, Jose Almanza MD, 90 mg at 10/06/18 0810  •  folic acid (FOLVITE) tablet 1,000 mcg, 1,000 mcg, Oral, Daily, Jose Almanza MD, 1,000 mcg at 10/06/18 0811  •  glucagon (human recombinant) (GLUCAGEN DIAGNOSTIC) injection 1 mg, 1 mg, Subcutaneous, PRN, Karlene Del Cid, APRN  •  HYDROmorphone (DILAUDID) injection 0.5 mg, 0.5 mg, Intravenous, Q2H PRN, Jose Almanza MD, 0.5 mg at 10/06/18 0513  •  hydrOXYzine (ATARAX) tablet 50 mg, 50 mg, Oral, TID PRN, Jose Almanza MD, 50 mg at 10/04/18 1137  •  insulin aspart (novoLOG) injection 0-10 Units, 0-10 Units, Subcutaneous, 4x Daily With Meals & Nightly, Merary Burnett MD, 2 Units at 10/06/18 0816  •  insulin aspart (novoLOG) injection 10 Units, 10 Units, Subcutaneous, TID With Meals, Merary Burnett MD, 10 Units at 10/06/18 0812  •  insulin detemir (LEVEMIR) injection 10 Units, 10 Units, Subcutaneous, Nightly, Merary Burnett MD, 10 Units at 10/05/18 2128  •  insulin detemir (LEVEMIR) injection 40 Units, 40 Units, Subcutaneous, QAM, Merary Burnett MD, 40 Units at 10/06/18 0812  •  lactated ringers infusion 1,000 mL, 1,000 mL, Intravenous, Continuous, Rich Coleman MD  •  levETIRAcetam (KEPPRA) tablet 500 mg, 500 mg, Oral, BID, Jose Almanza MD, 500 mg at 10/06/18 0811  •  nitroglycerin (NITROSTAT) SL tablet 0.4 mg, 0.4 mg, Sublingual, Q5 Min PRN, Karlene Del Cid, APRN  •  ondansetron (ZOFRAN) tablet 4 mg, 4 mg, Oral, Q6H PRN **OR** ondansetron ODT (ZOFRAN-ODT) disintegrating tablet 4 mg, 4 mg, Oral, Q6H PRN **OR** ondansetron (ZOFRAN) injection 4 mg, 4 mg, Intravenous,  Q6H PRN, Karlene Del Cid APRN, 4 mg at 10/05/18 1350  •  oxyCODONE (ROXICODONE) immediate release tablet 10 mg, 10 mg, Oral, Q4H PRN, Jose Almanza MD, 10 mg at 10/06/18 0812  •  pantoprazole (PROTONIX) 40 mg/100 mL (0.4 mg/mL) in 0.9% NS IVPB, 8 mg/hr, Intravenous, Continuous, Jose Almanza MD, Last Rate: 20 mL/hr at 10/06/18 1013, 8 mg/hr at 10/06/18 1013  •  potassium chloride 10 mEq in 100 mL IVPB, 10 mEq, Intravenous, Q1H PRN, Jose Almanza MD  •  predniSONE (DELTASONE) tablet 10 mg, 10 mg, Oral, Daily, Jose Almanza MD, 10 mg at 10/06/18 0811  •  pregabalin (LYRICA) capsule 75 mg, 75 mg, Oral, BID, Jose Almanza MD, 75 mg at 10/06/18 0810  •  promethazine (PHENERGAN) injection 12.5 mg, 12.5 mg, Intravenous, Q6H PRN, Karlene Del Cid APRLUIS CARLOS, 12.5 mg at 10/04/18 1702  •  promethazine (PHENERGAN) tablet 25 mg, 25 mg, Oral, Q6H PRN, Jose Almanza MD, 25 mg at 10/06/18 0513  •  rifaximin (XIFAXAN) tablet 550 mg, 550 mg, Oral, BID, Jose Almanza MD, 550 mg at 10/06/18 0811  •  sodium chloride 0.9 % flush 1-10 mL, 1-10 mL, Intravenous, PRN, Karlene Del Cid, APRN  •  Insert peripheral IV, , , Once **AND** sodium chloride 0.9 % flush 10 mL, 10 mL, Intravenous, PRN, Reynaldo Albright PA  •  sodium chloride 0.9 % flush 3 mL, 3 mL, Intravenous, Q12H, Karlene Del Cid APRLUIS CARLOS, 3 mL at 10/06/18 0901  •  sodium chloride 0.9 % infusion, 75 mL/hr, Intravenous, Continuous, Karlene Del Cid APRN, Last Rate: 75 mL/hr at 10/06/18 0818, 75 mL/hr at 10/06/18 0818  •  spironolactone (ALDACTONE) tablet 100 mg, 100 mg, Oral, Daily, Jose Almanza MD, 100 mg at 10/06/18 0811  •  sucralfate (CARAFATE) tablet 1 g, 1 g, Oral, BID, Jose Almanza MD, 1 g at 10/06/18 0811  •  trimethobenzamide (TIGAN) capsule 300 mg, 300 mg, Oral, TID, Jose Almanza MD, 300 mg at 10/06/18 0811  Allergies  Allergies   Allergen Reactions   • Albuterol Anaphylaxis   • Tramadol Nausea Only, Other (See Comments) and GI  "Intolerance     Per pt causes her \"palsy, meaning shaking and tremors\"    • Lactulose Nausea And Vomiting and Other (See Comments)     Severe abdominal pain   • Quinine Derivatives Nausea And Vomiting     Review of Systems  Fourteen point review of systems performed.  Positive pertinents identified above in history of presenting illness; all remaining systems negative. Also positive for joint pains from RA.        Objective:     Vitals:    10/06/18 0820   BP: 129/74   Pulse: 104   Resp: 18   Temp: 98.5 °F (36.9 °C)   SpO2: 98%     GENERAL:  Well-developed, well-nourished female in no acute distress.   SKIN:  Warm, dry without rashes, purpura or petechiae.  HEAD:  Normocephalic.  EYES:  Pupils equal, round and reactive to light.  EOMs intact.  Conjunctivae normal.  EARS:  Hearing intact.  NOSE:  Septum midline.  No excoriations or nasal discharge.  MOUTH:  Tongue is well-papillated; no stomatitis or ulcers.  Lips normal.  THROAT:  Oropharynx without lesions or exudates.  NECK:  Supple with good range of motion; no thyromegaly or masses, no JVD.  LYMPHATICS:  No cervical, supraclavicular, axillary adenopathy.  CHEST:  Lungs clear to percussion and auscultation. Good airflow.  CARDIAC:  Regular rate and rhythm without murmurs, rubs or gallops. Normal S1,S2.  ABDOMEN:  Soft, mild tenderness diffusely, normal bowel sounds.   EXTREMITIES:  No clubbing, cyanosis or edema.  PSYCHIATRIC:  Normal affect and mood.        DIAGNOSTIC DATA:    Results from last 7 days  Lab Units 10/06/18  0437 10/05/18  0352 10/04/18  0900   WBC 10*3/mm3 1.91* 2.40* 2.02*   HEMOGLOBIN g/dL 9.9* 10.4* 10.5*   HEMATOCRIT % 31.8* 33.2* 32.6*   PLATELETS 10*3/mm3 95*  95* 94* 109*     Lab Results   Component Value Date    NEUTROABS 0.99 (C) 10/06/2018         Results from last 7 days  Lab Units 10/05/18  0352 10/04/18  0900 10/03/18  0342   SODIUM mmol/L 138 139 144   POTASSIUM mmol/L 3.7 4.1 3.4*   CHLORIDE mmol/L 106 107 106   CO2 mmol/L 18.6* " 17.9* 24.2   BUN mg/dL 7 9 5*   CREATININE mg/dL 0.64 0.80 0.78   CALCIUM mg/dL 8.3* 8.3* 9.5   BILIRUBIN mg/dL 1.2 1.5* 1.3*   ALK PHOS U/L 168* 177* 207*   ALT (SGPT) U/L 19 23 30   AST (SGOT) U/L 30 30 38*   GLUCOSE mg/dL 141* 204* 158*       Ct Abdomen Pelvis Without Contrast    Result Date: 10/3/2018  Narrative: CT ABDOMEN AND PELVIS WITHOUT CONTRAST  HISTORY: Abdominal pain and distention.  TECHNIQUE: Axial CT images of the abdomen and pelvis were obtained without administration of intravenous contrast. The patient was not given oral contrast. Coronal and sagittal reformats were obtained.  COMPARISON: 06/14/2018.  FINDINGS: The liver demonstrates a cirrhotic morphology. There is a hypoattenuating subcapsular region seen within the right hepatic lobe, image 57 measuring approximately 3.8 x 2.7 cm. This is not characterized on the current noncontrast study. May represent an area of mild heterogeneity due to underlying fibrosis and steatosis however a mass cannot be excluded. The spleen is enlarged measuring approximately 17.6 cm in AP dimension. The gallbladder is surgically absent. Portal systemic collaterals are seen in the perigastric and perisplenic region. Multiple venous collaterals are seen within the mesentery and the omentum. The pancreas is normal without ductal dilatation. Bilateral adrenal glands and kidneys are unremarkable. The urinary bladder is minimally distended limiting evaluation. The uterus is not identified and is likely surgically absent. The small and large bowel loops demonstrate normal caliber. There is mild stranding within the mesentery likely related to portal venous congestion. There is additionally circumferential wall thickening involving the 2nd and 3rd portion of the duodenum that may represent duodenitis.      Impression: 1. Cirrhosis with sequela of portal hypertension to include portal systemic collaterals and splenomegaly. No ascites is identified. 2. Diffuse mesenteric  stranding likely related to venous congestion. 3. Ill-defined heterogenous predominantly hypoattenuating lesion within the inferior right hepatic lobe as discussed above. This finding is not characterized on the current noncontrast study. This may be secondary to heterogenous/geographic steatosis and fibrosis however a small mass lesion cannot be excluded. Follow-up with MR or CT liver mass protocol is recommended. 4. Diffuse wall thickening and stranding surrounding the 2nd and 3rd portion of the duodenum that may represent duodenitis.  Radiation dose reduction techniques were utilized, including automated exposure control and exposure modulation based on body size.  This report was finalized on 10/3/2018 12:10 PM by Dr. Rose Lin M.D.      Xr Chest 1 View    Result Date: 10/3/2018  Narrative: PORTABLE CHEST RADIOGRAPH  HISTORY: Shortness of air  COMPARISON: March 30, 2018  TECHNIQUE: Single view  FINDINGS: Cardiomediastinal silhouette appears prominent. However, this may be related to portable technique. No pneumothorax or pleural effusion is seen. No acute infiltrates are identified. I see no evidence of vascular congestion.      Impression: Cardiac size appears more prominent on today's exam. However, this may be related to differences in technique. No acute infiltrate identified.  This report was finalized on 10/3/2018 12:02 AM by Dr. Racquel Pablo M.D.      Mri Abdomen W Wo Contrast Mrcp    Result Date: 10/5/2018  Narrative: MRI OF THE LIVER WITH AND WITHOUT CONTRAST, MRCP  HISTORY: Possible mass in the right lobe of liver. Cirrhosis.  TECHNIQUE: MRI of the abdomen was performed utilizing a dedicated liver protocol. Axial 2-D FIESTA, in and out of phase FSPGR, axial fat-saturated T2 imaging was acquired. Axial thin section precontrast and dynamic enhanced postcontrast T1 weighted LAVA imaging was acquired. Thick slab coronal MRCP imaging was acquired in multiple obliquities through the biliary tree.  Coronal thin section fat saturated T2 and 3-D MRCP imaging was acquired.  COMPARISON: CT abdomen and pelvis without contrast from 10/03/2018.  FINDINGS: Cirrhotic morphology of liver is present. The spleen remains enlarged measuring 15.3 cm craniocaudal and approximately 18.9 cm in AP dimension. The signal intensity of the liver is normal. No suspicious hepatic lesions are seen on precontrast T1 and T2-weighted images. There is no intrahepatic biliary dilatation. Following administration of contrast the liver demonstrates heterogenous perfusion on the arterial and venous phases. There is a wedge-shaped area of hyperperfusion seen within the posterior superior right hepatic lobe, segment 7 that becomes isointense to the rest of the liver on the venous phase and likely represents a transient hepatic perfusion anomaly. No suspicious arterially enhancing lesion is identified. The portal vein and intrahepatic branches are patent. The splenoportal confluence is patent. A T1 and T2 hyperintense 9 mm lesion seen within the superior pole of the spleen does not demonstrate any postcontrast enhancement and may represent a complex cyst. There is an additional simple cyst seen more medially. Numerous portosystemic collaterals are seen within the omentum and the mesentery. The visualized portions of the kidneys are unremarkable. There is mild circumferential wall thickening with some mucosal edema involving the 2nd and 3rd portion of the duodenum with surrounding periduodenal fluid.      Impression: 1. Hepatic cirrhosis with sequela of portal hypertension to include splenomegaly and portosystemic collaterals. 2. Heterogenous perfusion of the liver without suspicious focal lesion to suggest hepatocellular carcinoma. 3. Wall thickening with submucosal edema seen within the 2nd and 3rd portion of the gallbladder with periduodenal fluid. These findings are most suggestive of duodenitis.  This report was finalized on 10/5/2018 10:26  AM by Dr. Rose Lin M.D.          Assessment/Plan   Assessment/Plan:   This is a 58 y.o. female with:    1. Chronic pancytopenia due to hypersplenism and cirrhosis   * No treatment; monitor.       2. Recent ITP, in May 2018 presenting with platelet count of 3.    * status post 5 doses IVIG and discharged with Prednisone taper.    * Remains on 10 mg daily.    * I suspect current level of thrombocytopenia is secondary to her liver disease rather than ITP.    3. Abd pain. Per GI. EGD unremarkable.   4. Rheumatoid arthritis  5. 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 DVT ppx: OK to resume DVT ppx with lovneox as long as platelets greater than 60K    Will follow up labs tomorrow. Case discussed with Dr Almanza.            Raegan Reid MD

## 2018-10-05 NOTE — PROGRESS NOTES
58 y.o.   LOS: 2 days   Patient Care Team:  Blanca Pitts MD as PCP - General (Internal Medicine)  Blanca Pitts MD as PCP - Claims Attributed  Sukhdeep Mcdowell MD as Consulting Physician (Hematology and Oncology)  Blanca Pitts MD as Referring Physician (Internal Medicine)  Rich Coleman MD as Consulting Physician (Gastroenterology)  Merary Burnett MD as Consulting Physician (Endocrinology)    Chief Complaint:     Chief Complaint   Patient presents with   • Vomiting   • Nausea   • Chest Pain   • Shortness of Breath       Subjective   Patient continues to have nausea and poor by mouth intake.  She has worsening abdominal pain today and is undergoing evaluation through GI.  Patient is receiving her levemir and NovoLog sliding-scale and scheduled pre-meals with her clear liquid diet.  Interval History:    Review of Systems:   Review of Systems   Constitutional: Positive for appetite change and fatigue. Negative for fever.   Respiratory: Negative for shortness of breath.    Cardiovascular: Negative for chest pain.   Gastrointestinal: Positive for abdominal pain and nausea. Negative for diarrhea and vomiting.   Endocrine: Negative for polydipsia and polyuria.   Genitourinary: Negative for flank pain.   Musculoskeletal: Positive for arthralgias. Negative for myalgias.   Skin: Negative for pallor.   Neurological: Positive for weakness. Negative for numbness.   Psychiatric/Behavioral: Negative for agitation.     Objective     Vital Signs   Temp:  [98.5 °F (36.9 °C)-98.9 °F (37.2 °C)] 98.9 °F (37.2 °C)  Heart Rate:  [101-124] 108  Resp:  [14-20] 14  BP: (128-145)/(76-87) 141/83    Physical Exam:  Physical Exam   Constitutional: She is oriented to person, place, and time. She appears well-nourished.   Obese     HENT:   Head: Normocephalic and atraumatic.   Wide neck   Eyes: Conjunctivae and EOM are normal. No scleral icterus.   Neck: Normal range of motion. Neck supple. No thyromegaly present.   Acanthosis  nigricans   Cardiovascular: Normal rate and normal heart sounds.    Pulmonary/Chest: Effort normal and breath sounds normal. No stridor. She has no wheezes.   Abdominal: Soft. Bowel sounds are normal. She exhibits distension. There is tenderness.   Central obesity   Musculoskeletal: She exhibits no edema or tenderness.   Neurological: She is alert and oriented to person, place, and time.   Skin: Skin is warm and dry. She is not diaphoretic.   Psychiatric: She has a normal mood and affect.   Vitals reviewed.  Results Review:     I reviewed the patient's new clinical results and summarized them in subjective and in plan.      Glucose   Date/Time Value Ref Range Status   10/05/2018 0352 141 (H) 65 - 99 mg/dL Final   10/04/2018 0900 204 (H) 65 - 99 mg/dL Final   10/03/2018 0342 158 (H) 65 - 99 mg/dL Final   10/02/2018 2332 311 (H) 65 - 99 mg/dL Final     Lab Results (last 24 hours)     Procedure Component Value Units Date/Time    POC Glucose Once [[id number]]  (Abnormal) Collected:  10/05/18 1030    Specimen:  Blood Updated:  10/05/18 1031     Glucose 142 (H) mg/dL     Narrative:       Meter: [id number] : [id number] Sam LOPEZ    POC Glucose Once [[id number]]  (Abnormal) Collected:  10/05/18 0627    Specimen:  Blood Updated:  10/05/18 0629     Glucose 156 (H) mg/dL     Narrative:       Meter: [id number] : [id number] Lexx JOE    AFP Tumor Marker [[id number]] Collected:  10/04/18 0417    Specimen:  Blood Updated:  10/05/18 0618     AFP Tumor Marker 2.1 ng/mL      Comment: [employer] ECLIA methodology       Narrative:       Performed at:  [address] [place] [address]  [id number]  : Quan Aguilar PhD, Phone:  [phone]    Comprehensive Metabolic Panel [[id number]]  (Abnormal) Collected:  10/05/18 0352    Specimen:  Blood Updated:  10/05/18 0439     Glucose 141 (H) mg/dL      BUN 7 mg/dL      Creatinine 0.64 mg/dL      Sodium 138 mmol/L      Potassium 3.7 mmol/L       Chloride 106 mmol/L      CO2 18.6 (L) mmol/L      Calcium 8.3 (L) mg/dL      Total Protein 6.0 g/dL      Albumin 3.30 (L) g/dL      ALT (SGPT) 19 U/L      AST (SGOT) 30 U/L      Alkaline Phosphatase 168 (H) U/L      Total Bilirubin 1.2 mg/dL      eGFR Non African Amer 96 mL/min/1.73      Globulin 2.7 gm/dL      A/G Ratio 1.2 g/dL      BUN/Creatinine Ratio 10.9     Anion Gap 13.4 mmol/L     CBC & Differential [[id number]] Collected:  10/05/18 0352    Specimen:  Blood Updated:  10/05/18 0430    Narrative:       The following orders were created for panel order CBC & Differential.  Procedure                               Abnormality         Status                     ---------                               -----------         ------                     CBC Auto Differential[[id number]]        Abnormal            Final result                 Please view results for these tests on the individual orders.    CBC Auto Differential [[id number]]  (Abnormal) Collected:  10/05/18 0352    Specimen:  Blood Updated:  10/05/18 0430     WBC 2.40 (L) 10*3/mm3      RBC 3.87 (L) 10*6/mm3      Hemoglobin 10.4 (L) g/dL      Hematocrit 33.2 (L) %      MCV 85.8 fL      MCH 26.9 pg      MCHC 31.3 (L) g/dL      RDW 16.0 (H) %      RDW-SD 50.2 fl      MPV 9.8 fL      Platelets 94 (L) 10*3/mm3      Neutrophil % 63.0 %      Lymphocyte % 17.9 (L) %      Monocyte % 15.0 (H) %      Eosinophil % 3.3 %      Basophil % 0.8 %      Immature Grans % 0.0 %      Neutrophils, Absolute 1.51 (L) 10*3/mm3      Lymphocytes, Absolute 0.43 (L) 10*3/mm3      Monocytes, Absolute 0.36 10*3/mm3      Eosinophils, Absolute 0.08 10*3/mm3      Basophils, Absolute 0.02 10*3/mm3      Immature Grans, Absolute 0.00 10*3/mm3     POC Glucose Once [[id number]]  (Abnormal) Collected:  10/04/18 2059    Specimen:  Blood Updated:  10/04/18 2101     Glucose 152 (H) mg/dL     Narrative:       Meter: [id number] : [id number] Lexx JOE    POC Glucose Once [[id number]]   (Abnormal) Collected:  10/04/18 1641    Specimen:  Blood Updated:  10/04/18 1643     Glucose 223 (H) mg/dL     Narrative:       Meter: [id number] : [id number] Miller Dobson PCA        Imaging Results (last 24 hours)     Procedure Component Value Units Date/Time    MRI abdomen w wo contrast mrcp [[id number]] Collected:  10/04/18 0828     Updated:  10/05/18 1029    Narrative:       MRI OF THE LIVER WITH AND WITHOUT CONTRAST, MRCP     HISTORY: Possible mass in the right lobe of liver. Cirrhosis.     TECHNIQUE: MRI of the abdomen was performed utilizing a dedicated liver  protocol. Axial 2-D FIESTA, in and out of phase FSPGR, axial  fat-saturated T2 imaging was acquired. Axial thin section precontrast  and dynamic enhanced postcontrast T1 weighted LAVA imaging was acquired.  Thick slab coronal MRCP imaging was acquired in multiple obliquities  through the biliary tree. Coronal thin section fat saturated T2 and 3-D  MRCP imaging was acquired.      COMPARISON: CT abdomen and pelvis without contrast from 10/03/2018.     FINDINGS: Cirrhotic morphology of liver is present. The spleen remains  enlarged measuring 15.3 cm craniocaudal and approximately 18.9 cm in AP  dimension. The signal intensity of the liver is normal. No suspicious  hepatic lesions are seen on precontrast T1 and T2-weighted images. There  is no intrahepatic biliary dilatation. Following administration of  contrast the liver demonstrates heterogenous perfusion on the arterial  and venous phases. There is a wedge-shaped area of hyperperfusion seen  within the posterior superior right hepatic lobe, segment 7 that becomes  isointense to the rest of the liver on the venous phase and likely  represents a transient hepatic perfusion anomaly. No suspicious  arterially enhancing lesion is identified. The portal vein and  intrahepatic branches are patent. The splenoportal confluence is patent.  A T1 and T2 hyperintense 9 mm lesion seen within the superior pole  of  the spleen does not demonstrate any postcontrast enhancement and may  represent a complex cyst. There is an additional simple cyst seen more  medially. Numerous portosystemic collaterals are seen within the omentum  and the mesentery. The visualized portions of the kidneys are  unremarkable. There is mild circumferential wall thickening with some  mucosal edema involving the 2nd and 3rd portion of the duodenum with  surrounding periduodenal fluid.       Impression:       1. Hepatic cirrhosis with sequela of portal hypertension to include  splenomegaly and portosystemic collaterals.  2. Heterogenous perfusion of the liver without suspicious focal lesion  to suggest hepatocellular carcinoma.  3. Wall thickening with submucosal edema seen within the 2nd and 3rd  portion of the gallbladder with periduodenal fluid. These findings are  most suggestive of duodenitis.     This report was finalized on 10/5/2018 10:26 AM by Dr. Rose Lin M.D.             Medication Review: DONE      Current Facility-Administered Medications:   •  acetaminophen (TYLENOL) tablet 650 mg, 650 mg, Oral, Q4H PRN, Kralene Del Cid APRN  •  ALPRAZolam (XANAX) tablet 0.5 mg, 0.5 mg, Oral, BID PRN, Jose Almanza MD, 0.5 mg at 10/04/18 2209  •  buPROPion XL (WELLBUTRIN XL) 24 hr tablet 150 mg, 150 mg, Oral, Daily, Jose Almanza MD, 150 mg at 10/05/18 0846  •  cyclobenzaprine (FLEXERIL) tablet 5 mg, 5 mg, Oral, TID PRN, Jose Almanza MD, 5 mg at 10/04/18 1137  •  dextrose (D50W) 25 g/ 50mL Intravenous Solution 25 g, 25 g, Intravenous, Q15 Min PRN, Karlene Del Cid APRN  •  dextrose (GLUTOSE) oral gel 15 g, 15 g, Oral, Q15 Min PRN, Karlene Del Cid APRN  •  DULoxetine (CYMBALTA) DR capsule 90 mg, 90 mg, Oral, Daily, Jose Almanza MD, 90 mg at 10/05/18 0846  •  folic acid (FOLVITE) tablet 1,000 mcg, 1,000 mcg, Oral, Daily, Jose Almanza MD, 1,000 mcg at 10/05/18 0145  •  glucagon (human recombinant) (GLUCAGEN DIAGNOSTIC)  injection 1 mg, 1 mg, Subcutaneous, PRN, Karlene Del Cid, APRN  •  HYDROmorphone (DILAUDID) injection 0.5 mg, 0.5 mg, Intravenous, Q2H PRN, Jose Almanza MD, 0.5 mg at 10/05/18 0903  •  hydrOXYzine (ATARAX) tablet 50 mg, 50 mg, Oral, TID PRN, Jose Almanza MD, 50 mg at 10/04/18 1137  •  insulin aspart (novoLOG) injection 0-10 Units, 0-10 Units, Subcutaneous, 4x Daily With Meals & Nightly, Merary Burnett MD, 2 Units at 10/05/18 0846  •  insulin aspart (novoLOG) injection 10 Units, 10 Units, Subcutaneous, TID With Meals, Merary Burnett MD, 10 Units at 10/05/18 0847  •  insulin detemir (LEVEMIR) injection 10 Units, 10 Units, Subcutaneous, Nightly, Merary Burnett MD, 10 Units at 10/04/18 2210  •  insulin detemir (LEVEMIR) injection 40 Units, 40 Units, Subcutaneous, QAM, Merary Burnett MD, 40 Units at 10/05/18 0856  •  lactated ringers infusion 1,000 mL, 1,000 mL, Intravenous, Continuous, Rich Coleman MD  •  levETIRAcetam (KEPPRA) tablet 500 mg, 500 mg, Oral, BID, Jose Almanza MD, 500 mg at 10/05/18 0846  •  lidocaine (XYLOCAINE) 1 % injection 0.5 mL, 0.5 mL, Intradermal, Once PRN, Rich Coleman MD  •  nitroglycerin (NITROSTAT) SL tablet 0.4 mg, 0.4 mg, Sublingual, Q5 Min PRN, Karlene Del Cid, APRN  •  ondansetron (ZOFRAN) tablet 4 mg, 4 mg, Oral, Q6H PRN **OR** ondansetron ODT (ZOFRAN-ODT) disintegrating tablet 4 mg, 4 mg, Oral, Q6H PRN **OR** ondansetron (ZOFRAN) injection 4 mg, 4 mg, Intravenous, Q6H PRN, Karlene Del Cid APRN, 4 mg at 10/05/18 1350  •  oxyCODONE (ROXICODONE) immediate release tablet 10 mg, 10 mg, Oral, Q4H PRN, Jose Almanza MD, 10 mg at 10/03/18 1040  •  pantoprazole (PROTONIX) 40 mg/100 mL (0.4 mg/mL) in 0.9% NS IVPB, 8 mg/hr, Intravenous, Continuous, Jose Almanza MD, Last Rate: 20 mL/hr at 10/04/18 1912, 8 mg/hr at 10/04/18 1912  •  potassium chloride 10 mEq in 100 mL IVPB, 10 mEq, Intravenous, Q1H PRN, Jose Alamnza MD  •  predniSONE (DELTASONE)  tablet 10 mg, 10 mg, Oral, Daily, Jose Almanza MD, 10 mg at 10/05/18 0846  •  pregabalin (LYRICA) capsule 75 mg, 75 mg, Oral, BID, Jose Almanza MD, 75 mg at 10/05/18 0845  •  promethazine (PHENERGAN) injection 12.5 mg, 12.5 mg, Intravenous, Q6H PRN, Karlene Del Cid APRN, 12.5 mg at 10/04/18 1702  •  promethazine (PHENERGAN) tablet 25 mg, 25 mg, Oral, Q6H PRN, Jose Almanza MD, 25 mg at 10/05/18 0846  •  rifaximin (XIFAXAN) tablet 550 mg, 550 mg, Oral, BID, Jose Almanza MD, 550 mg at 10/05/18 0846  •  sodium chloride 0.9 % flush 1-10 mL, 1-10 mL, Intravenous, PRN, Karlene Del Cid, APRN  •  Insert peripheral IV, , , Once **AND** sodium chloride 0.9 % flush 10 mL, 10 mL, Intravenous, PRN, Reynaldo Albright PA  •  sodium chloride 0.9 % flush 3 mL, 3 mL, Intravenous, Q12H, Karlene Del Cid APRN, 3 mL at 10/05/18 0903  •  sodium chloride 0.9 % flush 3 mL, 3 mL, Intravenous, PRN, Rich Coleman MD  •  sodium chloride 0.9 % infusion, 75 mL/hr, Intravenous, Continuous, Karlene Del Cid APRLUIS CARLOS, Last Rate: 75 mL/hr at 10/04/18 1428, 75 mL/hr at 10/04/18 1428  •  spironolactone (ALDACTONE) tablet 100 mg, 100 mg, Oral, Daily, Jose Almanza MD, 100 mg at 10/05/18 0845  •  sucralfate (CARAFATE) tablet 1 g, 1 g, Oral, BID, Jose Almanza MD, 1 g at 10/05/18 0846  •  trimethobenzamide (TIGAN) capsule 300 mg, 300 mg, Oral, TID, Jose Almanza MD, 300 mg at 10/05/18 0846    Assessment/Plan     Active Hospital Problems    Diagnosis Date Noted   • **UGI bleed [K92.2] 10/04/2018   • Intractable vomiting with nausea [R11.2] 10/03/2018   • Gastroparesis [K31.84] 10/17/2017   • Abdominal pain [R10.9] 02/14/2017   • Type 2 diabetes mellitus, uncontrolled, with neuropathy (CMS/HCC) [E11.40, E11.65] 03/15/2016   • Cirrhosis of liver (CMS/HCC) [K74.60] 03/08/2016   • Rheumatoid arthritis (CMS/HCC) [M06.9] 03/08/2016   • Anxiety and depression [F41.9, F32.9] 03/08/2016      Resolved Hospital Problems    Diagnosis  "Date Noted Date Resolved   No resolved problems to display.     Type 2 diabetes mellitus-significantly uncontrolled  Continue levemir 40 units in the morning  Continue NovoLog 10 units with each meal along with holding parameters.  Continue levemir 10 units at bedtime.  Continue NovoLog sliding scale 3 times a day before meals and at bedtime.    Currently holding patient's U-500 insulin.    She is on prednisone 10 mg oral daily.    Pending TSH levels.   TSH - 3.2 from July 2018.     Discussed plan with Nurse.     Merary Burnett MD.  10/05/18  11:37 AM      EMR Dragon / transcription disclaimer:    \"Dictated utilizing Dragon dictation\".   "

## 2018-10-05 NOTE — PROGRESS NOTES
Brief (courtesy) procedure note:    Procedure: EGD    Pre-op diagnosis: N/V/abnormal imaging.     Post-op diagnosis: Minimal esophageal varices. Duodenum looks fine.     Recommendations: Advance diet.     Please refer to the Provation-generated note for photos and further details.     Rich Coleman MD

## 2018-10-05 NOTE — ANESTHESIA POSTPROCEDURE EVALUATION
"Patient: Silvia Zabala    Procedure Summary     Date:  10/05/18 Room / Location:   [facility]    Anesthesia Start:  1347 Anesthesia Stop:  1403    Procedure:  ESOPHAGOGASTRODUODENOSCOPY (N/A Esophagus) Diagnosis:       UGI bleed      (UGI bleed [K92.2])    Surgeon:  Rich Coleman MD Provider:  Marco Maciel MD    Anesthesia Type:  MAC ASA Status:  3          Anesthesia Type: MAC  Last vitals  BP   112/60 (10/05/18 1402)   Temp   37.2 °C (98.9 °F) (10/05/18 0919)   Pulse   97 (10/05/18 1402)   Resp   16 (10/05/18 1402)     SpO2   96 % (10/05/18 1402)     Post Anesthesia Care and Evaluation    Patient location during evaluation: bedside  Patient participation: complete - patient participated  Level of consciousness: awake and alert  Pain management: adequate  Airway patency: patent  Anesthetic complications: No anesthetic complications    Cardiovascular status: acceptable  Respiratory status: acceptable  Hydration status: acceptable    Comments: /60 (BP Location: Left arm, Patient Position: Lying)   Pulse 97   Temp 37.2 °C (98.9 °F) (Oral)   Resp 16   Ht 168.9 cm (66.5\")   Wt 87.7 kg (193 lb 6.4 oz)   SpO2 96%   BMI 30.75 kg/m²       "

## 2018-10-05 NOTE — ANESTHESIA PREPROCEDURE EVALUATION
Anesthesia Evaluation     Patient summary reviewed and Nursing notes reviewed   history of anesthetic complications: PONV               Airway   Mallampati: I  TM distance: >3 FB  Neck ROM: full  No difficulty expected  Dental - normal exam     Pulmonary - normal exam   (+) sleep apnea,   Cardiovascular - normal exam    (+) hyperlipidemia,       Neuro/Psych  (+) seizures, headaches, numbness, psychiatric history Anxiety and Depression,     GI/Hepatic/Renal/Endo    (+)  hiatal hernia, PUD, GI bleeding, hepatitis, liver disease, diabetes mellitus,     Musculoskeletal     Abdominal  - normal exam    Bowel sounds: normal.   Substance History - negative use     OB/GYN negative ob/gyn ROS         Other   (+) blood dyscrasia, arthritis                     Anesthesia Plan    ASA 3     MAC     Anesthetic plan, all risks, benefits, and alternatives have been provided, discussed and informed consent has been obtained with: patient.

## 2018-10-05 NOTE — PROGRESS NOTES
Asked to see patient in consultation for thrombocytopenia by Dr Almanza. Chart reviewed and discussed with her primary hematologist, Dr Mcdowell. Will see patient tomorrow and need order for consult.

## 2018-10-05 NOTE — PROGRESS NOTES
[facility]     LOS: 2 days     Subjective:    Urinary retention and will check a PVR    HA, nausea, some dry heaves    Not eating much      Objective:    Vital Signs:  Temp:  [98.2 °F (36.8 °C)-98.9 °F (37.2 °C)] 98.9 °F (37.2 °C)  Heart Rate:  [101-124] 101  Resp:  [18-20] 18  BP: (128-145)/(71-87) 145/87    SpO2:  [92 %-98 %] 92 %  on   ;   Device (Oxygen Therapy): room air  Body mass index is 30.75 kg/m².    Physical Exam   Constitutional: She appears well-developed and well-nourished.   HENT:   Head: Normocephalic and atraumatic.   Cardiovascular: Normal rate and regular rhythm.    No murmur heard.  Pulmonary/Chest: Effort normal and breath sounds normal.   Abdominal: Soft. Bowel sounds are normal. She exhibits no distension. There is tenderness.   Neurological: She is alert.   Skin: Skin is warm and dry.       Results Review:    Glucose   Date Value Ref Range Status   10/05/2018 141 (H) 65 - 99 mg/dL Final   10/04/2018 204 (H) 65 - 99 mg/dL Final   10/03/2018 158 (H) 65 - 99 mg/dL Final   10/02/2018 311 (H) 65 - 99 mg/dL Final       Results from last 7 days  Lab Units 10/05/18  0352   WBC 10*3/mm3 2.40*   HEMOGLOBIN g/dL 10.4*   HEMATOCRIT % 33.2*   PLATELETS 10*3/mm3 94*       Results from last 7 days  Lab Units 10/05/18  0352   SODIUM mmol/L 138   POTASSIUM mmol/L 3.7   CHLORIDE mmol/L 106   CO2 mmol/L 18.6*   BUN mg/dL 7   CREATININE mg/dL 0.64   CALCIUM mg/dL 8.3*   BILIRUBIN mg/dL 1.2   ALK PHOS U/L 168*   ALT (SGPT) U/L 19   AST (SGOT) U/L 30   GLUCOSE mg/dL 141*       Results from last 7 days  Lab Units 10/02/18  2332   INR  1.24*   APTT seconds 30.2           Results from last 7 days  Lab Units 10/04/18  0900 10/02/18  2332   TROPONIN T ng/mL <0.010 <0.010     Cultures:       I have reviewed daily medications and changes in CPOE    Scheduled meds    buPROPion  mg Oral Daily   DULoxetine 90 mg Oral Daily   folic acid 1,000 mcg Oral Daily   insulin aspart 0-10 Units  Subcutaneous 4x Daily With Meals & Nightly   insulin aspart 10 Units Subcutaneous TID With Meals   insulin detemir 10 Units Subcutaneous Nightly   insulin detemir 40 Units Subcutaneous QAM   levETIRAcetam 500 mg Oral BID   predniSONE 10 mg Oral Daily   pregabalin 75 mg Oral BID   rifaximin 550 mg Oral BID   sodium chloride 3 mL Intravenous Q12H   spironolactone 100 mg Oral Daily   sucralfate 1 g Oral BID   trimethobenzamide 300 mg Oral TID         pantoprazole 8 mg/hr Last Rate: 8 mg/hr (10/04/18 1912)   sodium chloride 75 mL/hr Last Rate: 75 mL/hr (10/04/18 1428)     PRN meds  •  acetaminophen  •  ALPRAZolam  •  cyclobenzaprine  •  dextrose  •  dextrose  •  glucagon (human recombinant)  •  HYDROmorphone  •  hydrOXYzine  •  nitroglycerin  •  ondansetron **OR** ondansetron ODT **OR** ondansetron  •  oxyCODONE  •  potassium chloride  •  promethazine  •  promethazine  •  sodium chloride  •  Insert peripheral IV **AND** sodium chloride      Active Problems:    Abdominal pain    Cirrhosis of liver (CMS/HCC)    Type 2 diabetes mellitus, uncontrolled, with neuropathy (CMS/HCC)    Gastroparesis    Rheumatoid arthritis (CMS/HCC)    Anxiety and depression    Intractable vomiting with nausea        Assessment/Plan:  Duodenitis - empiric ppi gtt    Prolonged qtc- better on the monitor      Abdominal pain- has gotten worse again, scope today      Cirrhosis of liver (CMS/HCC)      Type 2 diabetes mellitus, uncontrolled, with neuropathy (CMS/HCC)  -blood sugars are good and endrinology is following      Gastroparesis      Rheumatoid arthritis (CMS/HCC)      Anxiety and depression      Intractable vomiting with nausea-last v in er  -no bm  -some nausea  -took some pain meds last night    Pancytopenia and the patient is followed by Dr. Mcdowell.  CBC group has been consulted    dvt prophylaxis-scd, platelets low so no lovenox        Jose Almanza MD  10/05/18  10:28 AM

## 2018-10-06 LAB
BASOPHILS # BLD AUTO: 0.02 10*3/MM3 (ref 0–0.2)
BASOPHILS NFR BLD AUTO: 1 % (ref 0–1.5)
DEPRECATED RDW RBC AUTO: 49.7 FL (ref 37–54)
EOSINOPHIL # BLD AUTO: 0.07 10*3/MM3 (ref 0–0.7)
EOSINOPHIL NFR BLD AUTO: 3.7 % (ref 0.3–6.2)
ERYTHROCYTE [DISTWIDTH] IN BLOOD BY AUTOMATED COUNT: 15.9 % (ref 11.7–13)
GLUCOSE BLDC GLUCOMTR-MCNC: 117 MG/DL (ref 70–130)
GLUCOSE BLDC GLUCOMTR-MCNC: 146 MG/DL (ref 70–130)
GLUCOSE BLDC GLUCOMTR-MCNC: 172 MG/DL (ref 70–130)
GLUCOSE BLDC GLUCOMTR-MCNC: 189 MG/DL (ref 70–130)
HCT VFR BLD AUTO: 31.8 % (ref 35.6–45.5)
HGB BLD-MCNC: 9.9 G/DL (ref 11.9–15.5)
IMM GRANULOCYTES # BLD: 0.03 10*3/MM3 (ref 0–0.03)
IMM GRANULOCYTES NFR BLD: 1.6 % (ref 0–0.5)
LYMPHOCYTES # BLD AUTO: 0.54 10*3/MM3 (ref 0.9–4.8)
LYMPHOCYTES NFR BLD AUTO: 28.3 % (ref 19.6–45.3)
MCH RBC QN AUTO: 26.8 PG (ref 26.9–32)
MCHC RBC AUTO-ENTMCNC: 31.1 G/DL (ref 32.4–36.3)
MCV RBC AUTO: 85.9 FL (ref 80.5–98.2)
MONOCYTES # BLD AUTO: 0.26 10*3/MM3 (ref 0.2–1.2)
MONOCYTES NFR BLD AUTO: 13.6 % (ref 5–12)
NEUTROPHILS # BLD AUTO: 0.99 10*3/MM3 (ref 1.9–8.1)
NEUTROPHILS NFR BLD AUTO: 51.8 % (ref 42.7–76)
PLATELET # BLD AUTO: 95 10*3/MM3 (ref 140–500)
PLATELET # BLD AUTO: 95 10*3/MM3 (ref 140–500)
PLATELETS.RETICULATED NFR BLD AUTO: 3.5 % (ref 0.9–6.5)
PMV BLD AUTO: 9.9 FL (ref 6–12)
RBC # BLD AUTO: 3.7 10*6/MM3 (ref 3.9–5.2)
TSH SERPL DL<=0.05 MIU/L-ACNC: 1.74 MIU/ML (ref 0.27–4.2)
WBC NRBC COR # BLD: 1.91 10*3/MM3 (ref 4.5–10.7)

## 2018-10-06 PROCEDURE — 99231 SBSQ HOSP IP/OBS SF/LOW 25: CPT | Performed by: INTERNAL MEDICINE

## 2018-10-06 PROCEDURE — 82962 GLUCOSE BLOOD TEST: CPT

## 2018-10-06 PROCEDURE — 99254 IP/OBS CNSLTJ NEW/EST MOD 60: CPT | Performed by: INTERNAL MEDICINE

## 2018-10-06 PROCEDURE — 25010000002 HYDROMORPHONE PER 4 MG: Performed by: HOSPITALIST

## 2018-10-06 PROCEDURE — 85055 RETICULATED PLATELET ASSAY: CPT | Performed by: INTERNAL MEDICINE

## 2018-10-06 PROCEDURE — 63710000001 PROMETHAZINE PER 25 MG: Performed by: HOSPITALIST

## 2018-10-06 PROCEDURE — 63710000001 PREDNISONE PER 5 MG: Performed by: HOSPITALIST

## 2018-10-06 PROCEDURE — 85025 COMPLETE CBC W/AUTO DIFF WBC: CPT | Performed by: INTERNAL MEDICINE

## 2018-10-06 PROCEDURE — 84443 ASSAY THYROID STIM HORMONE: CPT | Performed by: INTERNAL MEDICINE

## 2018-10-06 PROCEDURE — 63710000001 INSULIN DETEMIR PER 5 UNITS: Performed by: INTERNAL MEDICINE

## 2018-10-06 PROCEDURE — 63710000001 INSULIN ASPART PER 5 UNITS: Performed by: INTERNAL MEDICINE

## 2018-10-06 PROCEDURE — 99232 SBSQ HOSP IP/OBS MODERATE 35: CPT | Performed by: INTERNAL MEDICINE

## 2018-10-06 PROCEDURE — 25010000002 ENOXAPARIN PER 10 MG: Performed by: INTERNAL MEDICINE

## 2018-10-06 RX ORDER — PANTOPRAZOLE SODIUM 40 MG/10ML
40 INJECTION, POWDER, LYOPHILIZED, FOR SOLUTION INTRAVENOUS
Status: DISCONTINUED | OUTPATIENT
Start: 2018-10-06 | End: 2018-10-11 | Stop reason: HOSPADM

## 2018-10-06 RX ORDER — BISACODYL 10 MG
10 SUPPOSITORY, RECTAL RECTAL DAILY PRN
Status: DISCONTINUED | OUTPATIENT
Start: 2018-10-06 | End: 2018-10-11 | Stop reason: HOSPADM

## 2018-10-06 RX ADMIN — Medication 3 ML: at 21:00

## 2018-10-06 RX ADMIN — TRIMETHOBENZAMIDE HYDROCHLORIDE 300 MG: 300 CAPSULE ORAL at 16:27

## 2018-10-06 RX ADMIN — ENOXAPARIN SODIUM 40 MG: 40 INJECTION SUBCUTANEOUS at 13:29

## 2018-10-06 RX ADMIN — PREGABALIN 75 MG: 75 CAPSULE ORAL at 20:36

## 2018-10-06 RX ADMIN — ALPRAZOLAM 0.5 MG: 0.5 TABLET ORAL at 20:36

## 2018-10-06 RX ADMIN — OXYCODONE HYDROCHLORIDE 10 MG: 5 TABLET ORAL at 16:22

## 2018-10-06 RX ADMIN — SPIRONOLACTONE 100 MG: 100 TABLET ORAL at 08:11

## 2018-10-06 RX ADMIN — SUCRALFATE 1 G: 1 TABLET ORAL at 20:36

## 2018-10-06 RX ADMIN — RIFAXIMIN 550 MG: 550 TABLET ORAL at 08:11

## 2018-10-06 RX ADMIN — PANTOPRAZOLE SODIUM 40 MG: 40 INJECTION, POWDER, FOR SOLUTION INTRAVENOUS at 20:36

## 2018-10-06 RX ADMIN — POLYETHYLENE GLYCOL 3350 17 G: 17 POWDER, FOR SOLUTION ORAL at 13:29

## 2018-10-06 RX ADMIN — BUPROPION HYDROCHLORIDE 150 MG: 150 TABLET, FILM COATED, EXTENDED RELEASE ORAL at 08:11

## 2018-10-06 RX ADMIN — PREDNISONE 10 MG: 10 TABLET ORAL at 08:11

## 2018-10-06 RX ADMIN — INSULIN ASPART 10 UNITS: 100 INJECTION, SOLUTION INTRAVENOUS; SUBCUTANEOUS at 17:03

## 2018-10-06 RX ADMIN — TRIMETHOBENZAMIDE HYDROCHLORIDE 300 MG: 300 CAPSULE ORAL at 20:36

## 2018-10-06 RX ADMIN — INSULIN ASPART 2 UNITS: 100 INJECTION, SOLUTION INTRAVENOUS; SUBCUTANEOUS at 17:04

## 2018-10-06 RX ADMIN — HYDROXYZINE HYDROCHLORIDE 50 MG: 50 TABLET, FILM COATED ORAL at 20:36

## 2018-10-06 RX ADMIN — INSULIN DETEMIR 10 UNITS: 100 INJECTION, SOLUTION SUBCUTANEOUS at 22:43

## 2018-10-06 RX ADMIN — PREGABALIN 75 MG: 75 CAPSULE ORAL at 08:10

## 2018-10-06 RX ADMIN — LEVETIRACETAM 500 MG: 500 TABLET, FILM COATED ORAL at 08:11

## 2018-10-06 RX ADMIN — FOLIC ACID 1000 MCG: 1 TABLET ORAL at 08:11

## 2018-10-06 RX ADMIN — BISACODYL 10 MG: 10 SUPPOSITORY RECTAL at 17:17

## 2018-10-06 RX ADMIN — Medication 3 ML: at 09:01

## 2018-10-06 RX ADMIN — INSULIN ASPART 2 UNITS: 100 INJECTION, SOLUTION INTRAVENOUS; SUBCUTANEOUS at 08:16

## 2018-10-06 RX ADMIN — PROMETHAZINE HYDROCHLORIDE 25 MG: 25 TABLET ORAL at 05:13

## 2018-10-06 RX ADMIN — OXYCODONE HYDROCHLORIDE 10 MG: 5 TABLET ORAL at 20:36

## 2018-10-06 RX ADMIN — SODIUM CHLORIDE 8 MG/HR: 900 INJECTION INTRAVENOUS at 04:27

## 2018-10-06 RX ADMIN — ALPRAZOLAM 0.5 MG: 0.5 TABLET ORAL at 09:46

## 2018-10-06 RX ADMIN — DULOXETINE HYDROCHLORIDE 90 MG: 60 CAPSULE, DELAYED RELEASE ORAL at 08:10

## 2018-10-06 RX ADMIN — RIFAXIMIN 550 MG: 550 TABLET ORAL at 20:36

## 2018-10-06 RX ADMIN — LEVETIRACETAM 500 MG: 500 TABLET, FILM COATED ORAL at 20:36

## 2018-10-06 RX ADMIN — SODIUM CHLORIDE 8 MG/HR: 900 INJECTION INTRAVENOUS at 10:13

## 2018-10-06 RX ADMIN — SODIUM CHLORIDE 75 ML/HR: 9 INJECTION, SOLUTION INTRAVENOUS at 08:18

## 2018-10-06 RX ADMIN — SODIUM CHLORIDE 75 ML/HR: 9 INJECTION, SOLUTION INTRAVENOUS at 22:42

## 2018-10-06 RX ADMIN — INSULIN ASPART 10 UNITS: 100 INJECTION, SOLUTION INTRAVENOUS; SUBCUTANEOUS at 08:12

## 2018-10-06 RX ADMIN — SUCRALFATE 1 G: 1 TABLET ORAL at 08:11

## 2018-10-06 RX ADMIN — INSULIN ASPART 10 UNITS: 100 INJECTION, SOLUTION INTRAVENOUS; SUBCUTANEOUS at 11:41

## 2018-10-06 RX ADMIN — INSULIN DETEMIR 40 UNITS: 100 INJECTION, SOLUTION SUBCUTANEOUS at 08:12

## 2018-10-06 RX ADMIN — HYDROMORPHONE HYDROCHLORIDE 0.5 MG: 1 INJECTION, SOLUTION INTRAMUSCULAR; INTRAVENOUS; SUBCUTANEOUS at 05:13

## 2018-10-06 RX ADMIN — OXYCODONE HYDROCHLORIDE 10 MG: 5 TABLET ORAL at 08:12

## 2018-10-06 RX ADMIN — TRIMETHOBENZAMIDE HYDROCHLORIDE 300 MG: 300 CAPSULE ORAL at 08:11

## 2018-10-06 RX ADMIN — PROMETHAZINE HYDROCHLORIDE 25 MG: 25 TABLET ORAL at 17:04

## 2018-10-06 NOTE — PROGRESS NOTES
"  ENDOCRINE    Subjective   AND PLANS  Silvia Zabala is a 58 y.o. female.     Follow-up diabetes     appetite poor.  Eating less than a half of meal.  Vomited once earlier.  No hematemesis.  Remains on prednisone 10 mg once a day.  Fasting glucose 172.  Random glucose 146.  Continue Levemir 40 units every morning and 10 units every evening and NovoLog 10 units with each meal plus sliding scale.        Objective   /74 (BP Location: Right arm, Patient Position: Lying)   Pulse 104   Temp 98.5 °F (36.9 °C) (Oral)   Resp 18   Ht 168.9 cm (66.5\")   Wt 87.7 kg (193 lb 6.4 oz)   SpO2 98%   BMI 30.75 kg/m²   Physical Exam    Awake, alert, not in distress.     no rales or wheezes.  Regular heart rate and rhythm.  Abdomen soft, nontender.  No cyanosis or pedal edema.  No asterixis.    Lab Results (last 24 hours)     Procedure Component Value Units Date/Time    POC Glucose Once [[id number]]  (Abnormal) Collected:  10/06/18 1034    Specimen:  Blood Updated:  10/06/18 1036     Glucose 146 (H) mg/dL     Narrative:       Meter: [id number] : [id number] SeNosopharmsky Barbara NA    POC Glucose Once [[id number]]  (Abnormal) Collected:  10/06/18 0738    Specimen:  Blood Updated:  10/06/18 0740     Glucose 172 (H) mg/dL     Narrative:       Meter: [id number] : [id number] Sekelsky Barbara NA    CBC & Differential [[id number]] Collected:  10/06/18 0437    Specimen:  Blood Updated:  10/06/18 0604    Narrative:       The following orders were created for panel order CBC & Differential.  Procedure                               Abnormality         Status                     ---------                               -----------         ------                     CBC Auto Differential[[id number]]        Abnormal            Final result                 Please view results for these tests on the individual orders.    CBC Auto Differential [[id number]]  (Abnormal) Collected:  10/06/18 0437    Specimen:  Blood Updated:  10/06/18 0604     " WBC 1.91 (L) 10*3/mm3      RBC 3.70 (L) 10*6/mm3      Hemoglobin 9.9 (L) g/dL      Hematocrit 31.8 (L) %      MCV 85.9 fL      MCH 26.8 (L) pg      MCHC 31.1 (L) g/dL      RDW 15.9 (H) %      RDW-SD 49.7 fl      MPV 9.9 fL      Platelets 95 (L) 10*3/mm3      Neutrophil % 51.8 %      Lymphocyte % 28.3 %      Monocyte % 13.6 (H) %      Eosinophil % 3.7 %      Basophil % 1.0 %      Immature Grans % 1.6 (H) %      Neutrophils, Absolute 0.99 (C) 10*3/mm3      Lymphocytes, Absolute 0.54 (L) 10*3/mm3      Monocytes, Absolute 0.26 10*3/mm3      Eosinophils, Absolute 0.07 10*3/mm3      Basophils, Absolute 0.02 10*3/mm3      Immature Grans, Absolute 0.03 10*3/mm3     TSH [[id number]]  (Normal) Collected:  10/06/18 0437    Specimen:  Blood Updated:  10/06/18 0602     TSH 1.740 mIU/mL     Immature Platelet Fraction [[id number]]  (Abnormal) Collected:  10/06/18 0437    Specimen:  Blood Updated:  10/06/18 0543     IPF 3.50 %      Platelets 95 (L) 10*3/mm3     POC Glucose Once [[id number]]  (Abnormal) Collected:  10/05/18 2121    Specimen:  Blood Updated:  10/05/18 2123     Glucose 180 (H) mg/dL     Narrative:       Meter: [id number] : [id number] Smyzer Lisa CNA    POC Glucose Once [[id number]]  (Normal) Collected:  10/05/18 1546    Specimen:  Blood Updated:  10/05/18 1547     Glucose 123 mg/dL     Narrative:       Meter: [id number] : [id number] Sam LOPEZ            Principal Problem:    UGI bleed  Active Problems:    Cirrhosis of liver (CMS/HCC)    Rheumatoid arthritis (CMS/HCC)    Anxiety and depression    Type 2 diabetes mellitus, uncontrolled, with neuropathy (CMS/HCC)    Abdominal pain    Gastroparesis    Intractable vomiting with nausea    Insulin therapy as discussed above.

## 2018-10-06 NOTE — PLAN OF CARE
Problem: Patient Care Overview  Goal: Plan of Care Review  Outcome: Ongoing (interventions implemented as appropriate)   10/06/18 1927   Plan of Care Review   Progress no change   OTHER   Outcome Summary Pt A&Ox4, VSS except pt still tachy. Pt also has had c/o of abd pain and constipation - pains meds administered and miralax and supporitory given. Pt also had urinary retention and had to be straight cath'd. WIll continue to monitor.    Coping/Psychosocial   Plan of Care Reviewed With patient

## 2018-10-06 NOTE — PLAN OF CARE
Problem: Patient Care Overview  Goal: Plan of Care Review  Outcome: Ongoing (interventions implemented as appropriate)   10/06/18 0873   Plan of Care Review   Progress no change   OTHER   Outcome Summary Pt a&ox4. Hasn't c/o nausea or vomiting this evening. Pain medications given earlier for headache and backache. Pt still on protonix gtt. VS stable. Currently resting comfortably in bed. Will continue to monitor.    Coping/Psychosocial   Plan of Care Reviewed With patient     Goal: Individualization and Mutuality  Outcome: Ongoing (interventions implemented as appropriate)    Goal: Discharge Needs Assessment  Outcome: Ongoing (interventions implemented as appropriate)    Goal: Interprofessional Rounds/Family Conf  Outcome: Ongoing (interventions implemented as appropriate)      Problem: Fall Risk (Adult)  Goal: Identify Related Risk Factors and Signs and Symptoms  Outcome: Ongoing (interventions implemented as appropriate)    Goal: Absence of Fall  Outcome: Ongoing (interventions implemented as appropriate)      Problem: Nausea/Vomiting (Adult)  Goal: Identify Related Risk Factors and Signs and Symptoms  Outcome: Ongoing (interventions implemented as appropriate)    Goal: Symptom Relief  Outcome: Ongoing (interventions implemented as appropriate)    Goal: Adequate Hydration  Outcome: Ongoing (interventions implemented as appropriate)      Problem: Skin Injury Risk (Adult)  Goal: Identify Related Risk Factors and Signs and Symptoms  Outcome: Ongoing (interventions implemented as appropriate)    Goal: Skin Health and Integrity  Outcome: Ongoing (interventions implemented as appropriate)

## 2018-10-06 NOTE — PROGRESS NOTES
[facility]  Inpatient Progress Note    Reason for Follow Up:  End stage liver disease, Wong, nausea, vomiting, abdominal pain, recent EGD.    Subjective     Interval History:   No new Complaints this morning    Current Facility-Administered Medications:   •  acetaminophen (TYLENOL) tablet 650 mg, 650 mg, Oral, Q4H PRN, Karlene Del Cid APRN  •  ALPRAZolam (XANAX) tablet 0.5 mg, 0.5 mg, Oral, BID PRN, Jose Almanza MD, 0.5 mg at 10/06/18 0946  •  bisacodyl (DULCOLAX) suppository 10 mg, 10 mg, Rectal, Daily PRN, Jose Almanza MD  •  buPROPion XL (WELLBUTRIN XL) 24 hr tablet 150 mg, 150 mg, Oral, Daily, Jose Almanza MD, 150 mg at 10/06/18 0811  •  cyclobenzaprine (FLEXERIL) tablet 5 mg, 5 mg, Oral, TID PRN, Jose Almanza MD, 5 mg at 10/04/18 1137  •  dextrose (D50W) 25 g/ 50mL Intravenous Solution 25 g, 25 g, Intravenous, Q15 Min PRN, Karlene Del Cid APRN  •  dextrose (GLUTOSE) oral gel 15 g, 15 g, Oral, Q15 Min PRN, Karlene Del Cid APRN  •  DULoxetine (CYMBALTA) DR capsule 90 mg, 90 mg, Oral, Daily, Jose Almanza MD, 90 mg at 10/06/18 0810  •  enoxaparin (LOVENOX) syringe 40 mg, 40 mg, Subcutaneous, Q24H, Raegan Reid MD  •  folic acid (FOLVITE) tablet 1,000 mcg, 1,000 mcg, Oral, Daily, Jose Almanza MD, 1,000 mcg at 10/06/18 0811  •  glucagon (human recombinant) (GLUCAGEN DIAGNOSTIC) injection 1 mg, 1 mg, Subcutaneous, PRN, Karlene Del Cid APRN  •  HYDROmorphone (DILAUDID) injection 0.5 mg, 0.5 mg, Intravenous, Q2H PRN, Jose Almanza MD, 0.5 mg at 10/06/18 0513  •  hydrOXYzine (ATARAX) tablet 50 mg, 50 mg, Oral, TID PRN, Jose Almanza MD, 50 mg at 10/04/18 1137  •  insulin aspart (novoLOG) injection 0-10 Units, 0-10 Units, Subcutaneous, 4x Daily With Meals & Nightly, Merary Burnett MD, 2 Units at 10/06/18 0816  •  insulin aspart (novoLOG) injection 10 Units, 10 Units, Subcutaneous, TID With Meals, Merary Burnett MD, 10 Units at 10/06/18 1141  •   insulin detemir (LEVEMIR) injection 10 Units, 10 Units, Subcutaneous, Nightly, Merary Burnett MD, 10 Units at 10/05/18 2128  •  insulin detemir (LEVEMIR) injection 40 Units, 40 Units, Subcutaneous, QAM, Merary Burnett MD, 40 Units at 10/06/18 0812  •  lactated ringers infusion 1,000 mL, 1,000 mL, Intravenous, Continuous, Rich Coleman MD  •  levETIRAcetam (KEPPRA) tablet 500 mg, 500 mg, Oral, BID, Jose Almanza MD, 500 mg at 10/06/18 0811  •  nitroglycerin (NITROSTAT) SL tablet 0.4 mg, 0.4 mg, Sublingual, Q5 Min PRN, Karlene Del Cid, APRN  •  ondansetron (ZOFRAN) tablet 4 mg, 4 mg, Oral, Q6H PRN **OR** ondansetron ODT (ZOFRAN-ODT) disintegrating tablet 4 mg, 4 mg, Oral, Q6H PRN **OR** ondansetron (ZOFRAN) injection 4 mg, 4 mg, Intravenous, Q6H PRN, Karlene Del Cid, APRN, 4 mg at 10/05/18 1350  •  oxyCODONE (ROXICODONE) immediate release tablet 10 mg, 10 mg, Oral, Q4H PRN, Jose Almanza MD, 10 mg at 10/06/18 0812  •  pantoprazole (PROTONIX) injection 40 mg, 40 mg, Intravenous, Q AM, Jose Almanza MD  •  polyethylene glycol 3350 powder (packet), 17 g, Oral, Daily, Jose Almanza MD  •  potassium chloride 10 mEq in 100 mL IVPB, 10 mEq, Intravenous, Q1H PRN, Jose Almanza MD  •  predniSONE (DELTASONE) tablet 10 mg, 10 mg, Oral, Daily, Jose Almanza MD, 10 mg at 10/06/18 0811  •  pregabalin (LYRICA) capsule 75 mg, 75 mg, Oral, BID, Jose Almanza MD, 75 mg at 10/06/18 0810  •  promethazine (PHENERGAN) injection 12.5 mg, 12.5 mg, Intravenous, Q6H PRN, Karlene Del Cid, APRN, 12.5 mg at 10/04/18 1702  •  promethazine (PHENERGAN) tablet 25 mg, 25 mg, Oral, Q6H PRN, Jose Almanza MD, 25 mg at 10/06/18 0513  •  rifaximin (XIFAXAN) tablet 550 mg, 550 mg, Oral, BID, Jose Almanza MD, 550 mg at 10/06/18 0811  •  sodium chloride 0.9 % flush 1-10 mL, 1-10 mL, Intravenous, PRN, Karlene Del Cid, APRN  •  Insert peripheral IV, , , Once **AND** sodium chloride 0.9 % flush 10 mL, 10 mL,  Intravenous, PRN, Reynaldo Albright PA  •  sodium chloride 0.9 % flush 3 mL, 3 mL, Intravenous, Q12H, Karlene Del Cid APRN, 3 mL at 10/06/18 0901  •  sodium chloride 0.9 % infusion, 75 mL/hr, Intravenous, Continuous, Karlene Del Cid APRN, Last Rate: 75 mL/hr at 10/06/18 0818, 75 mL/hr at 10/06/18 0818  •  spironolactone (ALDACTONE) tablet 100 mg, 100 mg, Oral, Daily, Jose Almanza MD, 100 mg at 10/06/18 0811  •  sucralfate (CARAFATE) tablet 1 g, 1 g, Oral, BID, Jose Almanza MD, 1 g at 10/06/18 0811  •  trimethobenzamide (TIGAN) capsule 300 mg, 300 mg, Oral, TID, Jose Almanza MD, 300 mg at 10/06/18 0811  Review of Systems:    She endorses weakness and fatigue all other systems reviewed and negative    Objective     Vital Signs  Temp:  [97.9 °F (36.6 °C)-98.7 °F (37.1 °C)] 98.5 °F (36.9 °C)  Heart Rate:  [] 104  Resp:  [16-18] 18  BP: (112-138)/(60-83) 129/74  Body mass index is 30.75 kg/m².    Intake/Output Summary (Last 24 hours) at 10/06/18 1316  Last data filed at 10/05/18 1718   Gross per 24 hour   Intake              210 ml   Output                0 ml   Net              210 ml     No intake/output data recorded.     Physical Exam:   General: patient awake, alert and cooperative   Eyes: Normal lids and lashes, no scleral icterus   Neck: supple, normal ROM   Skin: warm and dry, not jaundiced   Cardiovascular: regular rhythm and rate, no murmurs auscultated   Pulm: clear to auscultation bilaterally, regular and unlabored   Abdomen: soft, nontender, nondistended; normal bowel sounds   Rectal: deferred   Extremities: no rash or edema   Psychiatric: Normal mood and behavior; memory intact     Results Review:     I reviewed the patient's new clinical results.      Results from last 7 days  Lab Units 10/06/18  0437 10/05/18  0352 10/04/18  0900   WBC 10*3/mm3 1.91* 2.40* 2.02*   HEMOGLOBIN g/dL 9.9* 10.4* 10.5*   HEMATOCRIT % 31.8* 33.2* 32.6*   PLATELETS 10*3/mm3 95*  95* 94* 109*       Results  from last 7 days  Lab Units 10/05/18  0352 10/04/18  0900 10/03/18  0342   SODIUM mmol/L 138 139 144   POTASSIUM mmol/L 3.7 4.1 3.4*   CHLORIDE mmol/L 106 107 106   CO2 mmol/L 18.6* 17.9* 24.2   BUN mg/dL 7 9 5*   CREATININE mg/dL 0.64 0.80 0.78   CALCIUM mg/dL 8.3* 8.3* 9.5   BILIRUBIN mg/dL 1.2 1.5* 1.3*   ALK PHOS U/L 168* 177* 207*   ALT (SGPT) U/L 19 23 30   AST (SGOT) U/L 30 30 38*   GLUCOSE mg/dL 141* 204* 158*       Results from last 7 days  Lab Units 10/02/18  2332   INR  1.24*       Lab Results  Lab Value Date/Time   LIPASE 12 (L) 10/02/2018 2332   LIPASE 9 (L) 05/18/2018 0425   LIPASE 22 12/16/2017 1650   LIPASE 10 (L) 10/30/2017 1732   LIPASE 9 (L) 02/21/2017 1202   LIPASE 16 11/10/2014 0724       Radiology:  MRI abdomen w wo contrast mrcp   Final Result   1. Hepatic cirrhosis with sequela of portal hypertension to include   splenomegaly and portosystemic collaterals.   2. Heterogenous perfusion of the liver without suspicious focal lesion   to suggest hepatocellular carcinoma.   3. Wall thickening with submucosal edema seen within the 2nd and 3rd   portion of the gallbladder with periduodenal fluid. These findings are   most suggestive of duodenitis.       This report was finalized on 10/5/2018 10:26 AM by Dr. Rose Lin M.D.          CT Abdomen Pelvis Without Contrast   Final Result   1. Cirrhosis with sequela of portal hypertension to include portal   systemic collaterals and splenomegaly. No ascites is identified.   2. Diffuse mesenteric stranding likely related to venous congestion.   3. Ill-defined heterogenous predominantly hypoattenuating lesion within   the inferior right hepatic lobe as discussed above. This finding is not   characterized on the current noncontrast study. This may be secondary to   heterogenous/geographic steatosis and fibrosis however a small mass   lesion cannot be excluded. Follow-up with MR or CT liver mass protocol   is recommended.   4. Diffuse wall thickening and  stranding surrounding the 2nd and 3rd   portion of the duodenum that may represent duodenitis.       Radiation dose reduction techniques were utilized, including automated   exposure control and exposure modulation based on body size.       This report was finalized on 10/3/2018 12:10 PM by Dr. Rose Lin M.D.          XR Chest 1 View   Final Result   Cardiac size appears more prominent on today's exam. However, this may   be related to differences in technique. No acute infiltrate identified.       This report was finalized on 10/3/2018 12:02 AM by Dr. Racquel Pablo M.D.              Assessment/Plan     Patient Active Problem List   Diagnosis   • Cirrhosis of liver (CMS/HCC)   • Rheumatoid arthritis (CMS/HCC)   • Anxiety and depression   • Type 2 diabetes mellitus, uncontrolled, with neuropathy (CMS/HCC)   • Fibrositis   • Change in blood platelet count   • Pancytopenia (CMS/HCC)   • Hypersplenism   • Nausea   • DUKES (nonalcoholic steatohepatitis)   • Hyperlipidemia   • Abdominal pain   • Hematemesis   • Ascites   • Portal hypertension (CMS/HCC)   • Systemic lupus (CMS/HCC)   • Secondary esophageal varices without bleeding (CMS/HCC)   • Gastroparesis   • Cirrhosis of liver without ascites (CMS/HCC)   • Diabetic ketoacidosis without coma associated with type 2 diabetes mellitus (CMS/HCC)   • Diabetic peripheral neuropathy (CMS/HCC)   • History of seizure disorder   • Hepatic encephalopathy (CMS/HCC)   • Abnormal finding of blood chemistry    • Fever   • Acute ITP (CMS/HCC)   • Degeneration of lumbosacral intervertebral disc   • Seizure (CMS/HCC)   • Spondylosis without myelopathy   • Intractable vomiting with nausea   • UGI bleed     Plan:    Continue current medication  When symptom-free plan for discharge home  Hematology evaluation underway  Continue  PPI, Xifaxan, sucralfate    I discussed the patients findings and my recommendations with patient and family .    Los Knutson MD

## 2018-10-07 LAB
BASOPHILS # BLD AUTO: 0.02 10*3/MM3 (ref 0–0.2)
BASOPHILS NFR BLD AUTO: 1.1 % (ref 0–1.5)
DEPRECATED RDW RBC AUTO: 50.7 FL (ref 37–54)
EOSINOPHIL # BLD AUTO: 0.05 10*3/MM3 (ref 0–0.7)
EOSINOPHIL NFR BLD AUTO: 2.8 % (ref 0.3–6.2)
ERYTHROCYTE [DISTWIDTH] IN BLOOD BY AUTOMATED COUNT: 16.2 % (ref 11.7–13)
GLUCOSE BLDC GLUCOMTR-MCNC: 169 MG/DL (ref 70–130)
GLUCOSE BLDC GLUCOMTR-MCNC: 177 MG/DL (ref 70–130)
GLUCOSE BLDC GLUCOMTR-MCNC: 199 MG/DL (ref 70–130)
GLUCOSE BLDC GLUCOMTR-MCNC: 206 MG/DL (ref 70–130)
GLUCOSE BLDC GLUCOMTR-MCNC: 278 MG/DL (ref 70–130)
HCT VFR BLD AUTO: 32.1 % (ref 35.6–45.5)
HGB BLD-MCNC: 9.9 G/DL (ref 11.9–15.5)
IMM GRANULOCYTES # BLD: 0 10*3/MM3 (ref 0–0.03)
IMM GRANULOCYTES NFR BLD: 0 % (ref 0–0.5)
LYMPHOCYTES # BLD AUTO: 0.43 10*3/MM3 (ref 0.9–4.8)
LYMPHOCYTES NFR BLD AUTO: 24 % (ref 19.6–45.3)
MCH RBC QN AUTO: 26.5 PG (ref 26.9–32)
MCHC RBC AUTO-ENTMCNC: 30.8 G/DL (ref 32.4–36.3)
MCV RBC AUTO: 86.1 FL (ref 80.5–98.2)
MONOCYTES # BLD AUTO: 0.34 10*3/MM3 (ref 0.2–1.2)
MONOCYTES NFR BLD AUTO: 19 % (ref 5–12)
NEUTROPHILS # BLD AUTO: 0.95 10*3/MM3 (ref 1.9–8.1)
NEUTROPHILS NFR BLD AUTO: 53.1 % (ref 42.7–76)
PLATELET # BLD AUTO: 85 10*3/MM3 (ref 140–500)
PMV BLD AUTO: 10.1 FL (ref 6–12)
RBC # BLD AUTO: 3.73 10*6/MM3 (ref 3.9–5.2)
WBC NRBC COR # BLD: 1.79 10*3/MM3 (ref 4.5–10.7)

## 2018-10-07 PROCEDURE — 63710000001 PREDNISONE PER 5 MG: Performed by: HOSPITALIST

## 2018-10-07 PROCEDURE — 85025 COMPLETE CBC W/AUTO DIFF WBC: CPT | Performed by: INTERNAL MEDICINE

## 2018-10-07 PROCEDURE — 63710000001 INSULIN ASPART PER 5 UNITS: Performed by: INTERNAL MEDICINE

## 2018-10-07 PROCEDURE — 25010000002 HYDROMORPHONE PER 4 MG: Performed by: HOSPITALIST

## 2018-10-07 PROCEDURE — 99231 SBSQ HOSP IP/OBS SF/LOW 25: CPT | Performed by: INTERNAL MEDICINE

## 2018-10-07 PROCEDURE — 25010000002 ENOXAPARIN PER 10 MG: Performed by: INTERNAL MEDICINE

## 2018-10-07 PROCEDURE — 99232 SBSQ HOSP IP/OBS MODERATE 35: CPT | Performed by: INTERNAL MEDICINE

## 2018-10-07 PROCEDURE — 82962 GLUCOSE BLOOD TEST: CPT

## 2018-10-07 PROCEDURE — 63710000001 INSULIN DETEMIR PER 5 UNITS: Performed by: INTERNAL MEDICINE

## 2018-10-07 RX ADMIN — INSULIN ASPART 10 UNITS: 100 INJECTION, SOLUTION INTRAVENOUS; SUBCUTANEOUS at 08:56

## 2018-10-07 RX ADMIN — DULOXETINE HYDROCHLORIDE 90 MG: 60 CAPSULE, DELAYED RELEASE ORAL at 08:57

## 2018-10-07 RX ADMIN — RIFAXIMIN 550 MG: 550 TABLET ORAL at 08:57

## 2018-10-07 RX ADMIN — LEVETIRACETAM 500 MG: 500 TABLET, FILM COATED ORAL at 20:51

## 2018-10-07 RX ADMIN — ENOXAPARIN SODIUM 40 MG: 40 INJECTION SUBCUTANEOUS at 11:59

## 2018-10-07 RX ADMIN — PREGABALIN 75 MG: 75 CAPSULE ORAL at 22:10

## 2018-10-07 RX ADMIN — INSULIN ASPART 2 UNITS: 100 INJECTION, SOLUTION INTRAVENOUS; SUBCUTANEOUS at 08:53

## 2018-10-07 RX ADMIN — TRIMETHOBENZAMIDE HYDROCHLORIDE 300 MG: 300 CAPSULE ORAL at 20:51

## 2018-10-07 RX ADMIN — Medication 3 ML: at 09:02

## 2018-10-07 RX ADMIN — INSULIN ASPART 4 UNITS: 100 INJECTION, SOLUTION INTRAVENOUS; SUBCUTANEOUS at 17:26

## 2018-10-07 RX ADMIN — LEVETIRACETAM 500 MG: 500 TABLET, FILM COATED ORAL at 08:57

## 2018-10-07 RX ADMIN — TRIMETHOBENZAMIDE HYDROCHLORIDE 300 MG: 300 CAPSULE ORAL at 08:56

## 2018-10-07 RX ADMIN — SUCRALFATE 1 G: 1 TABLET ORAL at 08:57

## 2018-10-07 RX ADMIN — INSULIN ASPART 10 UNITS: 100 INJECTION, SOLUTION INTRAVENOUS; SUBCUTANEOUS at 17:27

## 2018-10-07 RX ADMIN — PREDNISONE 10 MG: 10 TABLET ORAL at 08:57

## 2018-10-07 RX ADMIN — TRIMETHOBENZAMIDE HYDROCHLORIDE 300 MG: 300 CAPSULE ORAL at 17:26

## 2018-10-07 RX ADMIN — OXYCODONE HYDROCHLORIDE 10 MG: 5 TABLET ORAL at 18:01

## 2018-10-07 RX ADMIN — ALPRAZOLAM 0.5 MG: 0.5 TABLET ORAL at 20:53

## 2018-10-07 RX ADMIN — INSULIN DETEMIR 10 UNITS: 100 INJECTION, SOLUTION SUBCUTANEOUS at 22:11

## 2018-10-07 RX ADMIN — BUPROPION HYDROCHLORIDE 150 MG: 150 TABLET, FILM COATED, EXTENDED RELEASE ORAL at 08:57

## 2018-10-07 RX ADMIN — HYDROMORPHONE HYDROCHLORIDE 0.5 MG: 1 INJECTION, SOLUTION INTRAMUSCULAR; INTRAVENOUS; SUBCUTANEOUS at 11:59

## 2018-10-07 RX ADMIN — INSULIN ASPART 10 UNITS: 100 INJECTION, SOLUTION INTRAVENOUS; SUBCUTANEOUS at 11:59

## 2018-10-07 RX ADMIN — SODIUM CHLORIDE 75 ML/HR: 9 INJECTION, SOLUTION INTRAVENOUS at 12:31

## 2018-10-07 RX ADMIN — PANTOPRAZOLE SODIUM 40 MG: 40 INJECTION, POWDER, FOR SOLUTION INTRAVENOUS at 08:57

## 2018-10-07 RX ADMIN — OXYCODONE HYDROCHLORIDE 10 MG: 5 TABLET ORAL at 08:56

## 2018-10-07 RX ADMIN — INSULIN DETEMIR 40 UNITS: 100 INJECTION, SOLUTION SUBCUTANEOUS at 08:56

## 2018-10-07 RX ADMIN — POLYETHYLENE GLYCOL 3350 17 G: 17 POWDER, FOR SOLUTION ORAL at 08:57

## 2018-10-07 RX ADMIN — FOLIC ACID 1000 MCG: 1 TABLET ORAL at 08:57

## 2018-10-07 RX ADMIN — INSULIN ASPART 6 UNITS: 100 INJECTION, SOLUTION INTRAVENOUS; SUBCUTANEOUS at 22:11

## 2018-10-07 RX ADMIN — SUCRALFATE 1 G: 1 TABLET ORAL at 20:51

## 2018-10-07 RX ADMIN — SPIRONOLACTONE 100 MG: 100 TABLET ORAL at 08:56

## 2018-10-07 RX ADMIN — OXYCODONE HYDROCHLORIDE 10 MG: 5 TABLET ORAL at 22:11

## 2018-10-07 RX ADMIN — HYDROXYZINE HYDROCHLORIDE 50 MG: 50 TABLET, FILM COATED ORAL at 20:51

## 2018-10-07 RX ADMIN — RIFAXIMIN 550 MG: 550 TABLET ORAL at 20:51

## 2018-10-07 RX ADMIN — PREGABALIN 75 MG: 75 CAPSULE ORAL at 08:56

## 2018-10-07 RX ADMIN — INSULIN ASPART 2 UNITS: 100 INJECTION, SOLUTION INTRAVENOUS; SUBCUTANEOUS at 11:59

## 2018-10-07 RX ADMIN — ALPRAZOLAM 0.5 MG: 0.5 TABLET ORAL at 14:46

## 2018-10-07 RX ADMIN — Medication 3 ML: at 22:12

## 2018-10-07 NOTE — PLAN OF CARE
Problem: Patient Care Overview  Goal: Plan of Care Review   10/07/18 4715   Plan of Care Review   Progress improving   OTHER   Outcome Summary Pt A&Ox4, VSS - HR less tachy today. Pt had better appetite today and c/o less pain. WBC and Absolute neutrophils very low, platelets decling too. Pt did have diarrhea today - will hold laxatives. Will continue to monitor.    Coping/Psychosocial   Plan of Care Reviewed With patient

## 2018-10-07 NOTE — PROGRESS NOTES
[facility]  Inpatient Progress Note    Reason for Follow Up:  End-stage liver disease, nausea and vomiting    Subjective     Interval History:   No Nausea today, abdominal pain continues    Current Facility-Administered Medications:   •  acetaminophen (TYLENOL) tablet 650 mg, 650 mg, Oral, Q4H PRN, Karlene Del Cid APRN  •  ALPRAZolam (XANAX) tablet 0.5 mg, 0.5 mg, Oral, BID PRN, Jose Almanza MD, 0.5 mg at 10/07/18 1446  •  bisacodyl (DULCOLAX) suppository 10 mg, 10 mg, Rectal, Daily PRN, Jose Almanza MD, 10 mg at 10/06/18 1717  •  buPROPion XL (WELLBUTRIN XL) 24 hr tablet 150 mg, 150 mg, Oral, Daily, Jose Almanza MD, 150 mg at 10/07/18 0857  •  cyclobenzaprine (FLEXERIL) tablet 5 mg, 5 mg, Oral, TID PRN, Jose Almanza MD, 5 mg at 10/04/18 1137  •  dextrose (D50W) 25 g/ 50mL Intravenous Solution 25 g, 25 g, Intravenous, Q15 Min PRN, Karlene Del Cid APRN  •  dextrose (GLUTOSE) oral gel 15 g, 15 g, Oral, Q15 Min PRN, Karlene Del Cid APRN  •  DULoxetine (CYMBALTA) DR capsule 90 mg, 90 mg, Oral, Daily, Jose Almanza MD, 90 mg at 10/07/18 0857  •  enoxaparin (LOVENOX) syringe 40 mg, 40 mg, Subcutaneous, Q24H, Raegan Reid MD, 40 mg at 10/07/18 1159  •  folic acid (FOLVITE) tablet 1,000 mcg, 1,000 mcg, Oral, Daily, Jose Almanza MD, 1,000 mcg at 10/07/18 0857  •  glucagon (human recombinant) (GLUCAGEN DIAGNOSTIC) injection 1 mg, 1 mg, Subcutaneous, PRN, Karlene Del Cid APRN  •  HYDROmorphone (DILAUDID) injection 0.5 mg, 0.5 mg, Intravenous, Q2H PRN, Jose Almanza MD, 0.5 mg at 10/07/18 1159  •  hydrOXYzine (ATARAX) tablet 50 mg, 50 mg, Oral, TID PRN, Jose Almanza MD, 50 mg at 10/06/18 2036  •  insulin aspart (novoLOG) injection 0-10 Units, 0-10 Units, Subcutaneous, 4x Daily With Meals & Nightly, Merary Burnett MD, 2 Units at 10/07/18 1159  •  insulin aspart (novoLOG) injection 10 Units, 10 Units, Subcutaneous, TID With Meals, Merary Burnett MD, 10  Units at 10/07/18 1159  •  insulin detemir (LEVEMIR) injection 10 Units, 10 Units, Subcutaneous, Nightly, Merary Burnett MD, 10 Units at 10/06/18 2243  •  insulin detemir (LEVEMIR) injection 40 Units, 40 Units, Subcutaneous, QAM, Merary Burnett MD, 40 Units at 10/07/18 0856  •  levETIRAcetam (KEPPRA) tablet 500 mg, 500 mg, Oral, BID, Jose Almanza MD, 500 mg at 10/07/18 0857  •  nitroglycerin (NITROSTAT) SL tablet 0.4 mg, 0.4 mg, Sublingual, Q5 Min PRN, Karlene Del Cid, APRN  •  ondansetron (ZOFRAN) tablet 4 mg, 4 mg, Oral, Q6H PRN **OR** ondansetron ODT (ZOFRAN-ODT) disintegrating tablet 4 mg, 4 mg, Oral, Q6H PRN **OR** ondansetron (ZOFRAN) injection 4 mg, 4 mg, Intravenous, Q6H PRN, Karlene Del Cid, APRN, 4 mg at 10/05/18 1350  •  oxyCODONE (ROXICODONE) immediate release tablet 10 mg, 10 mg, Oral, Q4H PRN, Jose Almanza MD, 10 mg at 10/07/18 0856  •  pantoprazole (PROTONIX) injection 40 mg, 40 mg, Intravenous, Q AM, Jose Almanza MD, 40 mg at 10/07/18 0857  •  polyethylene glycol 3350 powder (packet), 17 g, Oral, Daily, Jose Almanza MD, 17 g at 10/07/18 0857  •  potassium chloride 10 mEq in 100 mL IVPB, 10 mEq, Intravenous, Q1H PRN, Jose Almanza MD  •  predniSONE (DELTASONE) tablet 10 mg, 10 mg, Oral, Daily, Jose Almanza MD, 10 mg at 10/07/18 0857  •  pregabalin (LYRICA) capsule 75 mg, 75 mg, Oral, BID, Jose Almanza MD, 75 mg at 10/07/18 0856  •  promethazine (PHENERGAN) injection 12.5 mg, 12.5 mg, Intravenous, Q6H PRN, Karlene Del Cid APRN, 12.5 mg at 10/04/18 1702  •  promethazine (PHENERGAN) tablet 25 mg, 25 mg, Oral, Q6H PRN, Jose Almanza MD, 25 mg at 10/06/18 1704  •  rifaximin (XIFAXAN) tablet 550 mg, 550 mg, Oral, BID, Jose Almanza MD, 550 mg at 10/07/18 0857  •  sodium chloride 0.9 % flush 1-10 mL, 1-10 mL, Intravenous, PRN, Karlene Del Cid APRLUIS CARLOS  •  Insert peripheral IV, , , Once **AND** sodium chloride 0.9 % flush 10 mL, 10 mL, Intravenous, PRN, Elder,  ARIAS Schmitt  •  sodium chloride 0.9 % flush 3 mL, 3 mL, Intravenous, Q12H, Karlene Del Cid APRN, 3 mL at 10/07/18 0902  •  sodium chloride 0.9 % infusion, 75 mL/hr, Intravenous, Continuous, Karlene Del Cid APRN, Last Rate: 75 mL/hr at 10/07/18 1231, 75 mL/hr at 10/07/18 1231  •  spironolactone (ALDACTONE) tablet 100 mg, 100 mg, Oral, Daily, Jose Almanza MD, 100 mg at 10/07/18 0856  •  sucralfate (CARAFATE) tablet 1 g, 1 g, Oral, BID, Jose Almanza MD, 1 g at 10/07/18 0857  •  trimethobenzamide (TIGAN) capsule 300 mg, 300 mg, Oral, TID, Jose Almanza MD, 300 mg at 10/07/18 0856  Review of Systems:    She endorses weakness and fatigue all other systems reviewed and negative    Objective     Vital Signs  Temp:  [98.1 °F (36.7 °C)-98.6 °F (37 °C)] 98.5 °F (36.9 °C)  Heart Rate:  [92-97] 97  Resp:  [16-18] 18  BP: (120-132)/(72-84) 132/76  Body mass index is 30.75 kg/m².    Intake/Output Summary (Last 24 hours) at 10/07/18 1603  Last data filed at 10/07/18 1231   Gross per 24 hour   Intake             2000 ml   Output             3850 ml   Net            -1850 ml     I/O this shift:  In: 1000 [I.V.:1000]  Out: 950 [Urine:950]     Physical Exam:   General: patient awake, alert and cooperative   Eyes: Normal lids and lashes, no scleral icterus   Neck: supple, normal ROM   Skin: warm and dry, not jaundiced   Cardiovascular: regular rhythm and rate, no murmurs auscultated   Pulm: clear to auscultation bilaterally, regular and unlabored   Abdomen: soft, nontender, nondistended; normal bowel sounds   Rectal: deferred   Extremities: no rash or edema   Psychiatric: Normal mood and behavior; memory intact     Results Review:     I reviewed the patient's new clinical results.      Results from last 7 days  Lab Units 10/07/18  0457 10/06/18  0437 10/05/18  0352   WBC 10*3/mm3 1.79* 1.91* 2.40*   HEMOGLOBIN g/dL 9.9* 9.9* 10.4*   HEMATOCRIT % 32.1* 31.8* 33.2*   PLATELETS 10*3/mm3 85* 95*  95* 94*       Results from  last 7 days  Lab Units 10/05/18  0352 10/04/18  0900 10/03/18  0342   SODIUM mmol/L 138 139 144   POTASSIUM mmol/L 3.7 4.1 3.4*   CHLORIDE mmol/L 106 107 106   CO2 mmol/L 18.6* 17.9* 24.2   BUN mg/dL 7 9 5*   CREATININE mg/dL 0.64 0.80 0.78   CALCIUM mg/dL 8.3* 8.3* 9.5   BILIRUBIN mg/dL 1.2 1.5* 1.3*   ALK PHOS U/L 168* 177* 207*   ALT (SGPT) U/L 19 23 30   AST (SGOT) U/L 30 30 38*   GLUCOSE mg/dL 141* 204* 158*       Results from last 7 days  Lab Units 10/02/18  2332   INR  1.24*       Lab Results  Lab Value Date/Time   LIPASE 12 (L) 10/02/2018 2332   LIPASE 9 (L) 05/18/2018 0425   LIPASE 22 12/16/2017 1650   LIPASE 10 (L) 10/30/2017 1732   LIPASE 9 (L) 02/21/2017 1202   LIPASE 16 11/10/2014 0724       Radiology:  MRI abdomen w wo contrast mrcp   Final Result   1. Hepatic cirrhosis with sequela of portal hypertension to include   splenomegaly and portosystemic collaterals.   2. Heterogenous perfusion of the liver without suspicious focal lesion   to suggest hepatocellular carcinoma.   3. Wall thickening with submucosal edema seen within the 2nd and 3rd   portion of the gallbladder with periduodenal fluid. These findings are   most suggestive of duodenitis.       This report was finalized on 10/5/2018 10:26 AM by Dr. Rose Lin M.D.          CT Abdomen Pelvis Without Contrast   Final Result   1. Cirrhosis with sequela of portal hypertension to include portal   systemic collaterals and splenomegaly. No ascites is identified.   2. Diffuse mesenteric stranding likely related to venous congestion.   3. Ill-defined heterogenous predominantly hypoattenuating lesion within   the inferior right hepatic lobe as discussed above. This finding is not   characterized on the current noncontrast study. This may be secondary to   heterogenous/geographic steatosis and fibrosis however a small mass   lesion cannot be excluded. Follow-up with MR or CT liver mass protocol   is recommended.   4. Diffuse wall thickening and  stranding surrounding the 2nd and 3rd   portion of the duodenum that may represent duodenitis.       Radiation dose reduction techniques were utilized, including automated   exposure control and exposure modulation based on body size.       This report was finalized on 10/3/2018 12:10 PM by Dr. Rose Lin M.D.          XR Chest 1 View   Final Result   Cardiac size appears more prominent on today's exam. However, this may   be related to differences in technique. No acute infiltrate identified.       This report was finalized on 10/3/2018 12:02 AM by Dr. Racquel Pablo M.D.              Assessment/Plan     Patient Active Problem List   Diagnosis   • Cirrhosis of liver (CMS/HCC)   • Rheumatoid arthritis (CMS/HCC)   • Anxiety and depression   • Type 2 diabetes mellitus, uncontrolled, with neuropathy (CMS/HCC)   • Fibrositis   • Change in blood platelet count   • Pancytopenia (CMS/HCC)   • Hypersplenism   • Nausea   • DUKES (nonalcoholic steatohepatitis)   • Hyperlipidemia   • Abdominal pain   • Hematemesis   • Ascites   • Portal hypertension (CMS/HCC)   • Systemic lupus (CMS/HCC)   • Secondary esophageal varices without bleeding (CMS/HCC)   • Gastroparesis   • Cirrhosis of liver without ascites (CMS/HCC)   • Diabetic ketoacidosis without coma associated with type 2 diabetes mellitus (CMS/HCC)   • Diabetic peripheral neuropathy (CMS/HCC)   • History of seizure disorder   • Hepatic encephalopathy (CMS/HCC)   • Abnormal finding of blood chemistry    • Fever   • Acute ITP (CMS/HCC)   • Degeneration of lumbosacral intervertebral disc   • Seizure (CMS/HCC)   • Spondylosis without myelopathy   • Intractable vomiting with nausea   • UGI bleed   Plan:     Continue current medication  When symptom-free plan for discharge home  Hematology evaluation underway  Continue  PPI, Xifaxan, sucralfate    I discussed the patients findings and my recommendations with patient and nursing staff.    Los Knutson,  MD

## 2018-10-07 NOTE — PROGRESS NOTES
"[facility] INPATIENT PROGRESS NOTE  Subjective     CC: Pancytopenia secondary to cirrhosis/splenomegaly but with history of ITP in May 2018    Interval history: No bleeding. Abd pain is improving with decreased \"pressure.\" Ambulating.     General ROS: positive for  - fatigue  negative for - chills or hot flashes  Cardiovascular ROS: no chest pain or dyspnea on exertion     Medications:  The current medication list was reviewed in the EMR.    Allergies:    Allergies   Allergen Reactions   • Albuterol Anaphylaxis   • Tramadol Nausea Only, Other (See Comments) and GI Intolerance     Per pt causes her \"palsy, meaning shaking and tremors\"    • Lactulose Nausea And Vomiting and Other (See Comments)     Severe abdominal pain   • Quinine Derivatives Nausea And Vomiting       Objective      Vitals:    10/07/18 0827   BP: 125/74   Pulse: 92   Resp: 18   Temp: 98.3 °F (36.8 °C)   SpO2:         Physical exam:   GENERAL: female comfortable, no acute distress  SKIN:  Warm, without rashes, purpura or petechiae.   EYES:  EOMs intact.  Conjunctivae normal. Pupils equal and reactive to light.   EARS:  Hearing intact.  LYMPHATICS:  No cervical, supraclavicular, axillary adenopathy.  RESP:  Lungs clear to percussion and auscultation. Good airflow. Normal effort.   CARDIAC:  Regular rate and rhythm without murmurs, rubs or gallops. Normal S1,S2. Lower extremity edema:  No  GI:  Soft, mildly tender diffusely, normal bowel sounds, no hepatosplenomegaly  PSYCHIATRIC:  Normal affect and mood; alert and oriented x 3; Insight and judgement appropriate            RECENT LABS:      Results from last 7 days  Lab Units 10/07/18  0457 10/06/18  0437 10/05/18  0352   WBC 10*3/mm3 1.79* 1.91* 2.40*   HEMOGLOBIN g/dL 9.9* 9.9* 10.4*   HEMATOCRIT % 32.1* 31.8* 33.2*   PLATELETS 10*3/mm3 85* 95*  95* 94*       Results from last 7 days  Lab Units 10/05/18  0352 10/04/18  0900 10/03/18  0342   SODIUM mmol/L 138 139 144   POTASSIUM mmol/L " 3.7 4.1 3.4*   CHLORIDE mmol/L 106 107 106   CO2 mmol/L 18.6* 17.9* 24.2   BUN mg/dL 7 9 5*   CREATININE mg/dL 0.64 0.80 0.78   CALCIUM mg/dL 8.3* 8.3* 9.5   BILIRUBIN mg/dL 1.2 1.5* 1.3*   ALK PHOS U/L 168* 177* 207*   ALT (SGPT) U/L 19 23 30   AST (SGOT) U/L 30 30 38*   GLUCOSE mg/dL 141* 204* 158*           Assessment/Plan   This is a 58 y.o. female with:     1. Chronic pancytopenia due to hypersplenism and cirrhosis              * No treatment; monitor.                  2. Recent ITP, in May 2018 presenting with platelet count of 3.               * status post 5 doses IVIG and discharged with Prednisone taper.               * Remains on prednisone 10 mg daily.               * I suspect current level of thrombocytopenia is secondary to her liver disease rather than ITP. Monitor.      3. Abd pain. Per GI. EGD unremarkable.   4. Rheumatoid arthritis  5. 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 DVT ppx: OK to resume DVT ppx with lovneox as long as platelets greater than 60K                Raegan Reid MD  10/7/2018  9:23 AM

## 2018-10-07 NOTE — PROGRESS NOTES
[facility]     LOS: 4 days     Subjective:    Urinary retention and will check a PVR    HA, nausea, some dry heaves    Eating today and some food got stuck in throat      Objective:    Vital Signs:  Temp:  [98.1 °F (36.7 °C)-98.6 °F (37 °C)] 98.3 °F (36.8 °C)  Heart Rate:  [92-97] 92  Resp:  [16-18] 18  BP: (120-132)/(72-84) 125/74    SpO2:  [96 %-100 %] 96 %  on   ;   Device (Oxygen Therapy): room air  Body mass index is 30.75 kg/m².    Physical Exam   Constitutional: She appears well-developed and well-nourished.   HENT:   Head: Normocephalic and atraumatic.   Cardiovascular: Normal rate and regular rhythm.    No murmur heard.  Pulmonary/Chest: Effort normal and breath sounds normal.   Abdominal: Soft. Bowel sounds are normal. She exhibits no distension. There is tenderness.   Neurological: She is alert.   Skin: Skin is warm and dry.       Results Review:    Glucose   Date Value Ref Range Status   10/05/2018 141 (H) 65 - 99 mg/dL Final       Results from last 7 days  Lab Units 10/07/18  0457   WBC 10*3/mm3 1.79*   HEMOGLOBIN g/dL 9.9*   HEMATOCRIT % 32.1*   PLATELETS 10*3/mm3 85*       Results from last 7 days  Lab Units 10/05/18  0352   SODIUM mmol/L 138   POTASSIUM mmol/L 3.7   CHLORIDE mmol/L 106   CO2 mmol/L 18.6*   BUN mg/dL 7   CREATININE mg/dL 0.64   CALCIUM mg/dL 8.3*   BILIRUBIN mg/dL 1.2   ALK PHOS U/L 168*   ALT (SGPT) U/L 19   AST (SGOT) U/L 30   GLUCOSE mg/dL 141*       Results from last 7 days  Lab Units 10/02/18  2332   INR  1.24*   APTT seconds 30.2           Results from last 7 days  Lab Units 10/04/18  0900 10/02/18  2332   TROPONIN T ng/mL <0.010 <0.010     Cultures:       I have reviewed daily medications and changes in CPOE    Scheduled meds    buPROPion  mg Oral Daily   DULoxetine 90 mg Oral Daily   enoxaparin 40 mg Subcutaneous Q24H   folic acid 1,000 mcg Oral Daily   insulin aspart 0-10 Units Subcutaneous 4x Daily With Meals & Nightly   insulin aspart 10  Units Subcutaneous TID With Meals   insulin detemir 10 Units Subcutaneous Nightly   insulin detemir 40 Units Subcutaneous QAM   levETIRAcetam 500 mg Oral BID   pantoprazole 40 mg Intravenous Q AM   polyethylene glycol 17 g Oral Daily   predniSONE 10 mg Oral Daily   pregabalin 75 mg Oral BID   rifaximin 550 mg Oral BID   sodium chloride 3 mL Intravenous Q12H   spironolactone 100 mg Oral Daily   sucralfate 1 g Oral BID   trimethobenzamide 300 mg Oral TID         sodium chloride 75 mL/hr Last Rate: 75 mL/hr (10/06/18 9902)     PRN meds  •  acetaminophen  •  ALPRAZolam  •  bisacodyl  •  cyclobenzaprine  •  dextrose  •  dextrose  •  glucagon (human recombinant)  •  HYDROmorphone  •  hydrOXYzine  •  nitroglycerin  •  ondansetron **OR** ondansetron ODT **OR** ondansetron  •  oxyCODONE  •  potassium chloride  •  promethazine  •  promethazine  •  sodium chloride  •  Insert peripheral IV **AND** sodium chloride      Principal Problem:    UGI bleed  Active Problems:    Abdominal pain    Cirrhosis of liver (CMS/HCC)    Type 2 diabetes mellitus, uncontrolled, with neuropathy (CMS/HCC)    Gastroparesis    Rheumatoid arthritis (CMS/HCC)    Anxiety and depression    Intractable vomiting with nausea        Assessment/Plan:  Duodenitis - empiric ppi gtt and change to 40 qday  -Carafate    Prolonged qtc- better on the monitor      Abdominal pain- is better      Cirrhosis of liver (CMS/HCC)      Type 2 diabetes mellitus, uncontrolled, with neuropathy (CMS/HCC)  -blood sugars are good and endrinology is following      Intractable vomiting with nausea /   Gastroparesis-last vomiting in er  -spitting up yesterday but none overnight none this morning    Pancytopenia and the patient is followed by Dr. Mcdowell.  CBC group has seen  -Dr. Reid is following patient    Urinary retention  -The patient continued to have urinary retention yesterday and the patient had a Rome catheter placed  -Likely related to medications     Rheumatoid arthritis  (CMS/[facility])      Anxiety and depression    dvt prophylaxis-scd, platelets low so no lovenox        Jose Almanza MD  10/07/18  11:23 AM

## 2018-10-07 NOTE — PLAN OF CARE
Problem: Patient Care Overview  Goal: Plan of Care Review  Outcome: Ongoing (interventions implemented as appropriate)   10/07/18 0418   Plan of Care Review   Progress no change   OTHER   Outcome Summary Rome cath inserted due to acute retention. VSS. Will continue to monitor.   Coping/Psychosocial   Plan of Care Reviewed With patient       Problem: Fall Risk (Adult)  Goal: Identify Related Risk Factors and Signs and Symptoms  Outcome: Ongoing (interventions implemented as appropriate)    Goal: Absence of Fall  Outcome: Ongoing (interventions implemented as appropriate)      Problem: Skin Injury Risk (Adult)  Goal: Identify Related Risk Factors and Signs and Symptoms  Outcome: Ongoing (interventions implemented as appropriate)    Goal: Skin Health and Integrity  Outcome: Ongoing (interventions implemented as appropriate)

## 2018-10-07 NOTE — PROGRESS NOTES
"  ENDOCRINE    Subjective   AND PLANS  Silvia Zabala is a 58 y.o. female.      Follow-up diabetes    Ate a small amount at breakfast and lunch today.  No vomiting.  Had one episode of sensation of food getting stuck in her throat.  Remains on prednisone 10 mg per day.    Fasting glucose 177.  Random glucose 117-199.  Continue Levemir 40 units every morning and 10 units every evening and NovoLog 10 units with each meal plus sliding scale.    Objective   /76 (BP Location: Right arm, Patient Position: Lying)   Pulse 97   Temp 98.5 °F (36.9 °C) (Oral)   Resp 18   Ht 168.9 cm (66.5\")   Wt 87.7 kg (193 lb 6.4 oz)   SpO2 95%   BMI 30.75 kg/m²   Physical Exam    Awake, alert, not in distress  No rales or wheezes.  Regular heart rate and rhythm.  Abdomen soft, nontender.    No cyanosis or pedal edema.  No asterixis    Lab Results (last 24 hours)     Procedure Component Value Units Date/Time    POC Glucose Once [[id number]]  (Abnormal) Collected:  10/07/18 1114    Specimen:  Blood Updated:  10/07/18 1115     Glucose 169 (H) mg/dL     Narrative:       Meter: [id number] : [id number] Harsha JOE    POC Glucose Once [[id number]]  (Abnormal) Collected:  10/07/18 0635    Specimen:  Blood Updated:  10/07/18 0637     Glucose 177 (H) mg/dL     Narrative:       Meter: [id number] : [id number] Smyzer Lisa CRAVEN    CBC & Differential [[id number]] Collected:  10/07/18 0457    Specimen:  Blood Updated:  10/07/18 0554    Narrative:       The following orders were created for panel order CBC & Differential.  Procedure                               Abnormality         Status                     ---------                               -----------         ------                     CBC Auto Differential[[id number]]        Abnormal            Final result                 Please view results for these tests on the individual orders.    CBC Auto Differential [[id number]]  (Abnormal) Collected:  10/07/18 0457    Specimen:  " Blood Updated:  10/07/18 0554     WBC 1.79 (C) 10*3/mm3      RBC 3.73 (L) 10*6/mm3      Hemoglobin 9.9 (L) g/dL      Hematocrit 32.1 (L) %      MCV 86.1 fL      MCH 26.5 (L) pg      MCHC 30.8 (L) g/dL      RDW 16.2 (H) %      RDW-SD 50.7 fl      MPV 10.1 fL      Platelets 85 (L) 10*3/mm3      Neutrophil % 53.1 %      Lymphocyte % 24.0 %      Monocyte % 19.0 (H) %      Eosinophil % 2.8 %      Basophil % 1.1 %      Immature Grans % 0.0 %      Neutrophils, Absolute 0.95 (C) 10*3/mm3      Lymphocytes, Absolute 0.43 (L) 10*3/mm3      Monocytes, Absolute 0.34 10*3/mm3      Eosinophils, Absolute 0.05 10*3/mm3      Basophils, Absolute 0.02 10*3/mm3      Immature Grans, Absolute 0.00 10*3/mm3     POC Glucose Once [[id number]]  (Abnormal) Collected:  10/07/18 0008    Specimen:  Blood Updated:  10/07/18 0009     Glucose 199 (H) mg/dL     Narrative:       Meter: [id number] : [id number] Smyzer Lisa CNA    POC Glucose Once [[id number]]  (Normal) Collected:  10/06/18 2151    Specimen:  Blood Updated:  10/06/18 2152     Glucose 117 mg/dL     Narrative:       Meter: [id number] : [id number] Smyzer Lisa CNA            Principal Problem:    UGI bleed  Active Problems:    Cirrhosis of liver (CMS/HCC)    Rheumatoid arthritis (CMS/HCC)    Anxiety and depression    Type 2 diabetes mellitus, uncontrolled, with neuropathy (CMS/HCC)    Abdominal pain    Gastroparesis    Intractable vomiting with nausea    Insulin therapy as discussed above.

## 2018-10-08 ENCOUNTER — APPOINTMENT (OUTPATIENT)
Dept: ONCOLOGY | Facility: HOSPITAL | Age: 58
End: 2018-10-08

## 2018-10-08 ENCOUNTER — APPOINTMENT (OUTPATIENT)
Dept: LAB | Facility: HOSPITAL | Age: 58
End: 2018-10-08

## 2018-10-08 LAB
ALBUMIN SERPL-MCNC: 3.2 G/DL (ref 3.5–5.2)
ALBUMIN/GLOB SERPL: 1.1 G/DL
ALP SERPL-CCNC: 139 U/L (ref 39–117)
ALT SERPL W P-5'-P-CCNC: 17 U/L (ref 1–33)
ANION GAP SERPL CALCULATED.3IONS-SCNC: 9.8 MMOL/L
AST SERPL-CCNC: 26 U/L (ref 1–32)
BASOPHILS # BLD AUTO: 0.02 10*3/MM3 (ref 0–0.2)
BASOPHILS NFR BLD AUTO: 1.1 % (ref 0–1.5)
BILIRUB SERPL-MCNC: 0.5 MG/DL (ref 0.1–1.2)
BUN BLD-MCNC: 5 MG/DL (ref 6–20)
BUN/CREAT SERPL: 7.4 (ref 7–25)
CALCIUM SPEC-SCNC: 8.8 MG/DL (ref 8.6–10.5)
CHLORIDE SERPL-SCNC: 108 MMOL/L (ref 98–107)
CO2 SERPL-SCNC: 24.2 MMOL/L (ref 22–29)
CREAT BLD-MCNC: 0.68 MG/DL (ref 0.57–1)
DEPRECATED RDW RBC AUTO: 50.8 FL (ref 37–54)
EOSINOPHIL # BLD AUTO: 0.08 10*3/MM3 (ref 0–0.7)
EOSINOPHIL NFR BLD AUTO: 4.4 % (ref 0.3–6.2)
ERYTHROCYTE [DISTWIDTH] IN BLOOD BY AUTOMATED COUNT: 16.2 % (ref 11.7–13)
GFR SERPL CREATININE-BSD FRML MDRD: 90 ML/MIN/1.73
GLOBULIN UR ELPH-MCNC: 2.8 GM/DL
GLUCOSE BLD-MCNC: 122 MG/DL (ref 65–99)
GLUCOSE BLDC GLUCOMTR-MCNC: 102 MG/DL (ref 70–130)
GLUCOSE BLDC GLUCOMTR-MCNC: 178 MG/DL (ref 70–130)
GLUCOSE BLDC GLUCOMTR-MCNC: 184 MG/DL (ref 70–130)
GLUCOSE BLDC GLUCOMTR-MCNC: 287 MG/DL (ref 70–130)
HCT VFR BLD AUTO: 30.8 % (ref 35.6–45.5)
HGB BLD-MCNC: 9.6 G/DL (ref 11.9–15.5)
IMM GRANULOCYTES # BLD: 0.02 10*3/MM3 (ref 0–0.03)
IMM GRANULOCYTES NFR BLD: 1.1 % (ref 0–0.5)
LYMPHOCYTES # BLD AUTO: 0.51 10*3/MM3 (ref 0.9–4.8)
LYMPHOCYTES NFR BLD AUTO: 27.9 % (ref 19.6–45.3)
MCH RBC QN AUTO: 26.9 PG (ref 26.9–32)
MCHC RBC AUTO-ENTMCNC: 31.2 G/DL (ref 32.4–36.3)
MCV RBC AUTO: 86.3 FL (ref 80.5–98.2)
MONOCYTES # BLD AUTO: 0.28 10*3/MM3 (ref 0.2–1.2)
MONOCYTES NFR BLD AUTO: 15.3 % (ref 5–12)
NEUTROPHILS # BLD AUTO: 0.92 10*3/MM3 (ref 1.9–8.1)
NEUTROPHILS NFR BLD AUTO: 50.2 % (ref 42.7–76)
PLATELET # BLD AUTO: 83 10*3/MM3 (ref 140–500)
PMV BLD AUTO: 10.2 FL (ref 6–12)
POTASSIUM BLD-SCNC: 3.7 MMOL/L (ref 3.5–5.2)
PROT SERPL-MCNC: 6 G/DL (ref 6–8.5)
RBC # BLD AUTO: 3.57 10*6/MM3 (ref 3.9–5.2)
SODIUM BLD-SCNC: 142 MMOL/L (ref 136–145)
WBC NRBC COR # BLD: 1.83 10*3/MM3 (ref 4.5–10.7)

## 2018-10-08 PROCEDURE — 82962 GLUCOSE BLOOD TEST: CPT

## 2018-10-08 PROCEDURE — 25010000002 ENOXAPARIN PER 10 MG: Performed by: INTERNAL MEDICINE

## 2018-10-08 PROCEDURE — 63710000001 INSULIN DETEMIR PER 5 UNITS: Performed by: INTERNAL MEDICINE

## 2018-10-08 PROCEDURE — 94799 UNLISTED PULMONARY SVC/PX: CPT

## 2018-10-08 PROCEDURE — 80053 COMPREHEN METABOLIC PANEL: CPT | Performed by: HOSPITALIST

## 2018-10-08 PROCEDURE — 63710000001 PROMETHAZINE PER 25 MG: Performed by: HOSPITALIST

## 2018-10-08 PROCEDURE — 99231 SBSQ HOSP IP/OBS SF/LOW 25: CPT | Performed by: INTERNAL MEDICINE

## 2018-10-08 PROCEDURE — 99232 SBSQ HOSP IP/OBS MODERATE 35: CPT | Performed by: INTERNAL MEDICINE

## 2018-10-08 PROCEDURE — 63710000001 PREDNISONE PER 5 MG: Performed by: HOSPITALIST

## 2018-10-08 PROCEDURE — 85025 COMPLETE CBC W/AUTO DIFF WBC: CPT | Performed by: INTERNAL MEDICINE

## 2018-10-08 PROCEDURE — 63710000001 INSULIN ASPART PER 5 UNITS: Performed by: INTERNAL MEDICINE

## 2018-10-08 PROCEDURE — 63710000001 INSULIN ASPART PER 5 UNITS: Performed by: HOSPITALIST

## 2018-10-08 RX ORDER — FUROSEMIDE 40 MG/1
TABLET ORAL
Qty: 60 TABLET | Refills: 1 | OUTPATIENT
Start: 2018-10-08 | End: 2018-10-11 | Stop reason: HOSPADM

## 2018-10-08 RX ORDER — LEVETIRACETAM 500 MG/1
TABLET ORAL
Qty: 60 TABLET | Refills: 0 | Status: SHIPPED | OUTPATIENT
Start: 2018-10-08 | End: 2018-11-07 | Stop reason: SDUPTHER

## 2018-10-08 RX ADMIN — INSULIN ASPART 2 UNITS: 100 INJECTION, SOLUTION INTRAVENOUS; SUBCUTANEOUS at 12:40

## 2018-10-08 RX ADMIN — SPIRONOLACTONE 100 MG: 100 TABLET ORAL at 09:39

## 2018-10-08 RX ADMIN — PREGABALIN 75 MG: 75 CAPSULE ORAL at 09:40

## 2018-10-08 RX ADMIN — INSULIN ASPART 8 UNITS: 100 INJECTION, SOLUTION INTRAVENOUS; SUBCUTANEOUS at 12:40

## 2018-10-08 RX ADMIN — SUCRALFATE 1 G: 1 TABLET ORAL at 20:55

## 2018-10-08 RX ADMIN — INSULIN ASPART 8 UNITS: 100 INJECTION, SOLUTION INTRAVENOUS; SUBCUTANEOUS at 16:44

## 2018-10-08 RX ADMIN — LEVETIRACETAM 500 MG: 500 TABLET, FILM COATED ORAL at 09:40

## 2018-10-08 RX ADMIN — LEVETIRACETAM 500 MG: 500 TABLET, FILM COATED ORAL at 20:55

## 2018-10-08 RX ADMIN — RIFAXIMIN 550 MG: 550 TABLET ORAL at 20:55

## 2018-10-08 RX ADMIN — PREGABALIN 75 MG: 75 CAPSULE ORAL at 20:55

## 2018-10-08 RX ADMIN — TRIMETHOBENZAMIDE HYDROCHLORIDE 300 MG: 300 CAPSULE ORAL at 20:55

## 2018-10-08 RX ADMIN — HYDROXYZINE HYDROCHLORIDE 50 MG: 50 TABLET, FILM COATED ORAL at 20:55

## 2018-10-08 RX ADMIN — Medication 3 ML: at 09:30

## 2018-10-08 RX ADMIN — PANTOPRAZOLE SODIUM 40 MG: 40 INJECTION, POWDER, FOR SOLUTION INTRAVENOUS at 09:43

## 2018-10-08 RX ADMIN — PREDNISONE 10 MG: 10 TABLET ORAL at 09:40

## 2018-10-08 RX ADMIN — BUPROPION HYDROCHLORIDE 150 MG: 150 TABLET, FILM COATED, EXTENDED RELEASE ORAL at 09:39

## 2018-10-08 RX ADMIN — PROMETHAZINE HYDROCHLORIDE 25 MG: 25 TABLET ORAL at 16:44

## 2018-10-08 RX ADMIN — DULOXETINE HYDROCHLORIDE 90 MG: 60 CAPSULE, DELAYED RELEASE ORAL at 09:40

## 2018-10-08 RX ADMIN — INSULIN ASPART 6 UNITS: 100 INJECTION, SOLUTION INTRAVENOUS; SUBCUTANEOUS at 22:12

## 2018-10-08 RX ADMIN — ENOXAPARIN SODIUM 40 MG: 40 INJECTION SUBCUTANEOUS at 12:40

## 2018-10-08 RX ADMIN — Medication 3 ML: at 20:55

## 2018-10-08 RX ADMIN — OXYCODONE HYDROCHLORIDE 10 MG: 5 TABLET ORAL at 12:50

## 2018-10-08 RX ADMIN — OXYCODONE HYDROCHLORIDE 10 MG: 5 TABLET ORAL at 19:18

## 2018-10-08 RX ADMIN — INSULIN DETEMIR 10 UNITS: 100 INJECTION, SOLUTION SUBCUTANEOUS at 22:12

## 2018-10-08 RX ADMIN — INSULIN ASPART 2 UNITS: 100 INJECTION, SOLUTION INTRAVENOUS; SUBCUTANEOUS at 16:45

## 2018-10-08 RX ADMIN — ALPRAZOLAM 0.5 MG: 0.5 TABLET ORAL at 20:55

## 2018-10-08 RX ADMIN — INSULIN DETEMIR 40 UNITS: 100 INJECTION, SOLUTION SUBCUTANEOUS at 06:03

## 2018-10-08 RX ADMIN — TRIMETHOBENZAMIDE HYDROCHLORIDE 300 MG: 300 CAPSULE ORAL at 09:40

## 2018-10-08 RX ADMIN — SUCRALFATE 1 G: 1 TABLET ORAL at 09:40

## 2018-10-08 RX ADMIN — FOLIC ACID 1000 MCG: 1 TABLET ORAL at 09:40

## 2018-10-08 RX ADMIN — TRIMETHOBENZAMIDE HYDROCHLORIDE 300 MG: 300 CAPSULE ORAL at 16:44

## 2018-10-08 RX ADMIN — POLYETHYLENE GLYCOL 3350 17 G: 17 POWDER, FOR SOLUTION ORAL at 09:39

## 2018-10-08 RX ADMIN — RIFAXIMIN 550 MG: 550 TABLET ORAL at 09:40

## 2018-10-08 NOTE — PLAN OF CARE
Problem: Patient Care Overview  Goal: Plan of Care Review  Outcome: Ongoing (interventions implemented as appropriate)   10/08/18 1084   Plan of Care Review   Progress no change   OTHER   Outcome Summary VSS today, pain meds PRN. c/o abdominal discomfort, encouraged to ambulate but having difficulty due to constant headache. F/C with great UOP, to be d/c'd in a.m. will closely monitor.   Coping/Psychosocial   Plan of Care Reviewed With patient       Problem: Fall Risk (Adult)  Goal: Absence of Fall  Outcome: Ongoing (interventions implemented as appropriate)      Problem: Nausea/Vomiting (Adult)  Goal: Identify Related Risk Factors and Signs and Symptoms  Outcome: Outcome(s) achieved Date Met: 10/08/18    Goal: Symptom Relief  Outcome: Ongoing (interventions implemented as appropriate)    Goal: Adequate Hydration  Outcome: Ongoing (interventions implemented as appropriate)      Problem: Skin Injury Risk (Adult)  Goal: Identify Related Risk Factors and Signs and Symptoms  Outcome: Ongoing (interventions implemented as appropriate)    Goal: Skin Health and Integrity  Outcome: Ongoing (interventions implemented as appropriate)

## 2018-10-08 NOTE — PROGRESS NOTES
58 y.o.   LOS: 5 days   Patient Care Team:  Blanca Pitts MD as PCP - General (Internal Medicine)  Blanca Pitts MD as PCP - Claims Attributed  Sukhdeep Mcdowell MD as Consulting Physician (Hematology and Oncology)  Blanca Pitts MD as Referring Physician (Internal Medicine)  Rich Coleman MD as Consulting Physician (Gastroenterology)  eMrary Burnett MD as Consulting Physician (Endocrinology)    Chief Complaint:     Chief Complaint   Patient presents with   • Vomiting   • Nausea   • Chest Pain   • Shortness of Breath       Subjective   Eating improved a bit. No further vomiting episodes.   Abd pain improving.     Interval History:    Review of Systems:   Review of Systems   Constitutional: Positive for appetite change and fatigue. Negative for fever.   Respiratory: Negative for shortness of breath.    Cardiovascular: Negative for chest pain.   Gastrointestinal: Positive for abdominal distention. Negative for diarrhea and vomiting.   Endocrine: Negative for polydipsia and polyuria.   Genitourinary: Negative for flank pain.   Musculoskeletal: Positive for arthralgias and myalgias.   Skin: Negative for pallor.   Neurological: Positive for weakness. Negative for numbness.   Psychiatric/Behavioral: Negative for agitation.     Objective     Vital Signs   Temp:  [98.3 °F (36.8 °C)-98.9 °F (37.2 °C)] 98.9 °F (37.2 °C)  Heart Rate:  [] 94  Resp:  [16-20] 18  BP: (125-139)/(73-83) 139/83    Physical Exam:  Physical Exam   Constitutional: She is oriented to person, place, and time. She appears well-nourished.   Obese     HENT:   Head: Normocephalic and atraumatic.   Wide neck   Eyes: Conjunctivae and EOM are normal. No scleral icterus.   Neck: Normal range of motion. Neck supple. No thyromegaly present.   Acanthosis nigricans   Cardiovascular: Normal rate and normal heart sounds.    Pulmonary/Chest: Effort normal and breath sounds normal. No stridor. She has no wheezes.   Abdominal: Soft. Bowel sounds are  normal. She exhibits distension. There is tenderness.   Central obesity   Musculoskeletal: She exhibits no edema or tenderness.   Neurological: She is alert and oriented to person, place, and time.   Skin: Skin is warm and dry. She is not diaphoretic.   Psychiatric: She has a normal mood and affect.   Vitals reviewed.  Results Review:     I reviewed the patient's new clinical results and summarized them in subjective and in plan.      Glucose   Date/Time Value Ref Range Status   10/08/2018 0504 122 (H) 65 - 99 mg/dL Final     Lab Results (last 24 hours)     Procedure Component Value Units Date/Time    POC Glucose Once [[id number]]  (Abnormal) Collected:  10/08/18 1151    Specimen:  Blood Updated:  10/08/18 1155     Glucose 178 (H) mg/dL     Narrative:       Meter: [id number] : [id number] [facility]    POC Glucose Once [[id number]]  (Normal) Collected:  10/08/18 0646    Specimen:  Blood Updated:  10/08/18 0646     Glucose 102 mg/dL     Narrative:       Meter: [id number] : [id number] Nikita Keyes A    Comprehensive Metabolic Panel [[id number]]  (Abnormal) Collected:  10/08/18 0504    Specimen:  Blood Updated:  10/08/18 0556     Glucose 122 (H) mg/dL      BUN 5 (L) mg/dL      Creatinine 0.68 mg/dL      Sodium 142 mmol/L      Potassium 3.7 mmol/L      Chloride 108 (H) mmol/L      CO2 24.2 mmol/L      Calcium 8.8 mg/dL      Total Protein 6.0 g/dL      Albumin 3.20 (L) g/dL      ALT (SGPT) 17 U/L      AST (SGOT) 26 U/L      Alkaline Phosphatase 139 (H) U/L      Total Bilirubin 0.5 mg/dL      eGFR Non African Amer 90 mL/min/1.73      Globulin 2.8 gm/dL      A/G Ratio 1.1 g/dL      BUN/Creatinine Ratio 7.4     Anion Gap 9.8 mmol/L     CBC & Differential [[id number]] Collected:  10/08/18 0504    Specimen:  Blood Updated:  10/08/18 0536    Narrative:       The following orders were created for panel order CBC & Differential.  Procedure                               Abnormality         Status                      ---------                               -----------         ------                     CBC Auto Differential[[id number]]        Abnormal            Final result                 Please view results for these tests on the individual orders.    CBC Auto Differential [[id number]]  (Abnormal) Collected:  10/08/18 0504    Specimen:  Blood Updated:  10/08/18 0536     WBC 1.83 (C) 10*3/mm3      RBC 3.57 (L) 10*6/mm3      Hemoglobin 9.6 (L) g/dL      Hematocrit 30.8 (L) %      MCV 86.3 fL      MCH 26.9 pg      MCHC 31.2 (L) g/dL      RDW 16.2 (H) %      RDW-SD 50.8 fl      MPV 10.2 fL      Platelets 83 (L) 10*3/mm3      Neutrophil % 50.2 %      Lymphocyte % 27.9 %      Monocyte % 15.3 (H) %      Eosinophil % 4.4 %      Basophil % 1.1 %      Immature Grans % 1.1 (H) %      Neutrophils, Absolute 0.92 (C) 10*3/mm3      Lymphocytes, Absolute 0.51 (L) 10*3/mm3      Monocytes, Absolute 0.28 10*3/mm3      Eosinophils, Absolute 0.08 10*3/mm3      Basophils, Absolute 0.02 10*3/mm3      Immature Grans, Absolute 0.02 10*3/mm3     POC Glucose Once [[id number]]  (Abnormal) Collected:  10/07/18 2104    Specimen:  Blood Updated:  10/07/18 2105     Glucose 278 (H) mg/dL     Narrative:       Meter: [id number] : [id number] [facility]    POC Glucose Once [[id number]]  (Abnormal) Collected:  10/07/18 1650    Specimen:  Blood Updated:  10/07/18 1653     Glucose 206 (H) mg/dL     Narrative:       Meter: [id number] : [id number] Harsha JOE        Imaging Results (last 24 hours)     ** No results found for the last 24 hours. **          Medication Review: DONE      Current Facility-Administered Medications:   •  acetaminophen (TYLENOL) tablet 650 mg, 650 mg, Oral, Q4H PRN, Nehemias, Karlene E, APRN  •  ALPRAZolam (XANAX) tablet 0.5 mg, 0.5 mg, Oral, BID PRN, Edling, Jose A, MD, 0.5 mg at 10/07/18 2053  •  bisacodyl (DULCOLAX) suppository 10 mg, 10 mg, Rectal, Daily PRN, Jose Almanza MD, 10 mg at 10/06/18 1717  •  buPROPion XL  (WELLBUTRIN XL) 24 hr tablet 150 mg, 150 mg, Oral, Daily, Jose Almanza MD, 150 mg at 10/08/18 0939  •  cyclobenzaprine (FLEXERIL) tablet 5 mg, 5 mg, Oral, TID PRN, Jose Almanza MD, 5 mg at 10/04/18 1137  •  dextrose (D50W) 25 g/ 50mL Intravenous Solution 25 g, 25 g, Intravenous, Q15 Min PRN, Karlene Del Cid APRN  •  dextrose (GLUTOSE) oral gel 15 g, 15 g, Oral, Q15 Min PRN, Karlene Del Cid, APRN  •  DULoxetine (CYMBALTA) DR capsule 90 mg, 90 mg, Oral, Daily, Jose Almanza MD, 90 mg at 10/08/18 0940  •  enoxaparin (LOVENOX) syringe 40 mg, 40 mg, Subcutaneous, Q24H, Raegan Reid MD, 40 mg at 10/08/18 1240  •  folic acid (FOLVITE) tablet 1,000 mcg, 1,000 mcg, Oral, Daily, Jose Almanza MD, 1,000 mcg at 10/08/18 0940  •  glucagon (human recombinant) (GLUCAGEN DIAGNOSTIC) injection 1 mg, 1 mg, Subcutaneous, PRN, Karlene Del Cid, APRN  •  hydrOXYzine (ATARAX) tablet 50 mg, 50 mg, Oral, TID PRN, Jose Almanza MD, 50 mg at 10/07/18 2051  •  insulin aspart (novoLOG) injection 0-10 Units, 0-10 Units, Subcutaneous, 4x Daily With Meals & Nightly, Merary Burnett MD, 2 Units at 10/08/18 1240  •  insulin aspart (novoLOG) injection 8 Units, 8 Units, Subcutaneous, TID With Meals, Jsoe Almanza MD, 8 Units at 10/08/18 1240  •  insulin detemir (LEVEMIR) injection 10 Units, 10 Units, Subcutaneous, Nightly, Merary Burnett MD, 10 Units at 10/07/18 2211  •  insulin detemir (LEVEMIR) injection 40 Units, 40 Units, Subcutaneous, QAM, Merary Burnett MD, 40 Units at 10/08/18 0603  •  levETIRAcetam (KEPPRA) tablet 500 mg, 500 mg, Oral, BID, Jose Almanza MD, 500 mg at 10/08/18 0940  •  nitroglycerin (NITROSTAT) SL tablet 0.4 mg, 0.4 mg, Sublingual, Q5 Min PRN, Karlene Del Cid, APRN  •  ondansetron (ZOFRAN) tablet 4 mg, 4 mg, Oral, Q6H PRN **OR** ondansetron ODT (ZOFRAN-ODT) disintegrating tablet 4 mg, 4 mg, Oral, Q6H PRN **OR** ondansetron (ZOFRAN) injection 4 mg, 4 mg, Intravenous, Q6H PRN, Nehemias,  Karlene BERNARD APRN, 4 mg at 10/05/18 1350  •  oxyCODONE (ROXICODONE) immediate release tablet 10 mg, 10 mg, Oral, Q4H PRN, Jose Almanza MD, 10 mg at 10/08/18 1250  •  pantoprazole (PROTONIX) injection 40 mg, 40 mg, Intravenous, Q AM, Jose Almanza MD, 40 mg at 10/08/18 0943  •  polyethylene glycol 3350 powder (packet), 17 g, Oral, Daily, Jose Almanza MD, 17 g at 10/08/18 0939  •  potassium chloride 10 mEq in 100 mL IVPB, 10 mEq, Intravenous, Q1H PRN, Jose Almanza MD  •  predniSONE (DELTASONE) tablet 10 mg, 10 mg, Oral, Daily, Jose Almanza MD, 10 mg at 10/08/18 0940  •  pregabalin (LYRICA) capsule 75 mg, 75 mg, Oral, BID, Jose Almanza MD, 75 mg at 10/08/18 0940  •  promethazine (PHENERGAN) injection 12.5 mg, 12.5 mg, Intravenous, Q6H PRN, Kalrene Del Cid APRN, 12.5 mg at 10/04/18 1702  •  promethazine (PHENERGAN) tablet 25 mg, 25 mg, Oral, Q6H PRN, Jose Almanza MD, 25 mg at 10/06/18 1704  •  rifaximin (XIFAXAN) tablet 550 mg, 550 mg, Oral, BID, Jose Almanza MD, 550 mg at 10/08/18 0940  •  sodium chloride 0.9 % flush 1-10 mL, 1-10 mL, Intravenous, PRN, Karlene Del Cid, APRN  •  Insert peripheral IV, , , Once **AND** sodium chloride 0.9 % flush 10 mL, 10 mL, Intravenous, PRN, Reynaldo Albright PA  •  sodium chloride 0.9 % flush 3 mL, 3 mL, Intravenous, Q12H, Karlene Del Cid APRN, 3 mL at 10/07/18 2212  •  spironolactone (ALDACTONE) tablet 100 mg, 100 mg, Oral, Daily, Jose Almanza MD, 100 mg at 10/08/18 0939  •  sucralfate (CARAFATE) tablet 1 g, 1 g, Oral, BID, Jose Almanza MD, 1 g at 10/08/18 0940  •  trimethobenzamide (TIGAN) capsule 300 mg, 300 mg, Oral, TID, Jose Almanza MD, 300 mg at 10/08/18 0940    Assessment/Plan     Active Hospital Problems    Diagnosis Date Noted   • **UGI bleed [K92.2] 10/04/2018   • Intractable vomiting with nausea [R11.2] 10/03/2018   • Gastroparesis [K31.84] 10/17/2017   • Abdominal pain [R10.9] 02/14/2017   • Type 2 diabetes  "mellitus, uncontrolled, with neuropathy (CMS/HCC) [E11.40, E11.65] 03/15/2016   • Cirrhosis of liver (CMS/HCC) [K74.60] 03/08/2016   • Rheumatoid arthritis (CMS/HCC) [M06.9] 03/08/2016   • Anxiety and depression [F41.9, F32.9] 03/08/2016      Resolved Hospital Problems    Diagnosis Date Noted Date Resolved   No resolved problems to display.     Type 2 diabetes mellitus-significantly uncontrolled  Continue levemir 40 units in the morning  Continue NovoLog 10 units with each meal along with holding parameters.  Continue levemir 10 units at bedtime.  Continue NovoLog sliding scale 3 times a day before meals and at bedtime.    Currently holding patient's U-500 insulin.    She is on prednisone 10 mg oral daily.    BG better and appetite improving, will continue current insulin regimen for now.     TSH - 3.2 from July 2018.     Discussed plan with Nurse.     Merary Burnett MD.  10/08/18  11:37 AM      EMR Dragon / transcription disclaimer:    \"Dictated utilizing Dragon dictation\".   "

## 2018-10-08 NOTE — PROGRESS NOTES
"[facility] INPATIENT PROGRESS NOTE  Subjective     CC: Pancytopenia secondary to cirrhosis/splenomegaly but with history of ITP in May 2018    Interval history: No bleeding. Abd pain is improving with decreased \"pressure.\" Ambulating.     General ROS: positive for  - fatigue  negative for - chills or hot flashes  Cardiovascular ROS: no chest pain or dyspnea on exertion     Medications:  The current medication list was reviewed in the EMR.    Allergies:    Allergies   Allergen Reactions   • Albuterol Anaphylaxis   • Tramadol Nausea Only, Other (See Comments) and GI Intolerance     Per pt causes her \"palsy, meaning shaking and tremors\"    • Lactulose Nausea And Vomiting and Other (See Comments)     Severe abdominal pain   • Quinine Derivatives Nausea And Vomiting       Objective      Vitals:    10/08/18 0935   BP: 130/78   Pulse: 93   Resp: 16   Temp:    SpO2: 98%        Physical exam:   GENERAL: female comfortable, no acute distress  SKIN:  Warm, without rashes, purpura or petechiae.   EYES:  EOMs intact.  Conjunctivae normal. Pupils equal and reactive to light.   EARS:  Hearing intact.  LYMPHATICS:  No cervical, supraclavicular, axillary adenopathy.  RESP:  Lungs clear to percussion and auscultation. Good airflow. Normal effort.   CARDIAC:  Regular rate and rhythm without murmurs, rubs or gallops. Normal S1,S2. Lower extremity edema:  No  GI:  Soft, mildly distended with no clear fluid wave, #S gastroparesis  PSYCHIATRIC:  Normal affect and mood; alert and oriented x 3; Insight and judgement appropriate            RECENT LABS:      Results from last 7 days  Lab Units 10/08/18  0504 10/07/18  0457 10/06/18  0437   WBC 10*3/mm3 1.83* 1.79* 1.91*   HEMOGLOBIN g/dL 9.6* 9.9* 9.9*   HEMATOCRIT % 30.8* 32.1* 31.8*   PLATELETS 10*3/mm3 83* 85* 95*  95*       Results from last 7 days  Lab Units 10/08/18  0504 10/05/18  0352 10/04/18  0900   SODIUM mmol/L 142 138 139   POTASSIUM mmol/L 3.7 3.7 4.1   CHLORIDE " mmol/L 108* 106 107   CO2 mmol/L 24.2 18.6* 17.9*   BUN mg/dL 5* 7 9   CREATININE mg/dL 0.68 0.64 0.80   CALCIUM mg/dL 8.8 8.3* 8.3*   BILIRUBIN mg/dL 0.5 1.2 1.5*   ALK PHOS U/L 139* 168* 177*   ALT (SGPT) U/L 17 19 23   AST (SGOT) U/L 26 30 30   GLUCOSE mg/dL 122* 141* 204*           Assessment/Plan   This is a 58 y.o. female with:     1. Chronic pancytopenia due to hypersplenism and cirrhosis              * No treatment; monitor.                  2. Recent ITP, in May 2018 presenting with platelet count of 3.               * status post 5 doses IVIG and discharged with Prednisone taper.               * Remains on prednisone 10 mg daily.               * Agree that current level of thrombocytopenia is secondary to her liver disease rather than ITP. Monitor.      3. Abd pain. Per GI. EGD unremarkable.   4. Rheumatoid arthritis  5. 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 DVT ppx: OK to resume DVT ppx with lovneox as long as platelets greater than 60K                Logan Copeland MD  10/8/2018  12:24 PM

## 2018-10-08 NOTE — PROGRESS NOTES
[facility]     LOS: 5 days     Subjective:    Urinary retention and now with phipps    HA still present slightly better    Nausea about her baseline    No vomiting overnight    Food no longer getting stuck      Objective:    Vital Signs:  Temp:  [98.3 °F (36.8 °C)-98.6 °F (37 °C)] 98.6 °F (37 °C)  Heart Rate:  [] 94  Resp:  [16-20] 16  BP: (125-132)/(73-76) 126/73    SpO2:  [94 %-98 %] 94 %  on   ;   Device (Oxygen Therapy): room air  Body mass index is 30.75 kg/m².    Physical Exam   Constitutional: She appears well-developed and well-nourished.   HENT:   Head: Normocephalic and atraumatic.   Cardiovascular: Normal rate and regular rhythm.    No murmur heard.  Pulmonary/Chest: Effort normal and breath sounds normal.   Abdominal: Soft. Bowel sounds are normal. She exhibits no distension. There is tenderness.   Neurological: She is alert.   Skin: Skin is warm and dry.       Results Review:    Glucose   Date Value Ref Range Status   10/08/2018 122 (H) 65 - 99 mg/dL Final       Results from last 7 days  Lab Units 10/08/18  0504   WBC 10*3/mm3 1.83*   HEMOGLOBIN g/dL 9.6*   HEMATOCRIT % 30.8*   PLATELETS 10*3/mm3 83*       Results from last 7 days  Lab Units 10/08/18  0504   SODIUM mmol/L 142   POTASSIUM mmol/L 3.7   CHLORIDE mmol/L 108*   CO2 mmol/L 24.2   BUN mg/dL 5*   CREATININE mg/dL 0.68   CALCIUM mg/dL 8.8   BILIRUBIN mg/dL 0.5   ALK PHOS U/L 139*   ALT (SGPT) U/L 17   AST (SGOT) U/L 26   GLUCOSE mg/dL 122*       Results from last 7 days  Lab Units 10/02/18  2332   INR  1.24*   APTT seconds 30.2           Results from last 7 days  Lab Units 10/04/18  0900 10/02/18  2332   TROPONIN T ng/mL <0.010 <0.010     Cultures:       I have reviewed daily medications and changes in CPOE    Scheduled meds    buPROPion  mg Oral Daily   DULoxetine 90 mg Oral Daily   enoxaparin 40 mg Subcutaneous Q24H   folic acid 1,000 mcg Oral Daily   insulin aspart 0-10 Units Subcutaneous 4x Daily With  Meals & Nightly   insulin aspart 10 Units Subcutaneous TID With Meals   insulin detemir 10 Units Subcutaneous Nightly   insulin detemir 40 Units Subcutaneous QAM   levETIRAcetam 500 mg Oral BID   pantoprazole 40 mg Intravenous Q AM   polyethylene glycol 17 g Oral Daily   predniSONE 10 mg Oral Daily   pregabalin 75 mg Oral BID   rifaximin 550 mg Oral BID   sodium chloride 3 mL Intravenous Q12H   spironolactone 100 mg Oral Daily   sucralfate 1 g Oral BID   trimethobenzamide 300 mg Oral TID         sodium chloride 75 mL/hr Last Rate: 75 mL/hr (10/07/18 1231)     PRN meds  •  acetaminophen  •  ALPRAZolam  •  bisacodyl  •  cyclobenzaprine  •  dextrose  •  dextrose  •  glucagon (human recombinant)  •  hydrOXYzine  •  nitroglycerin  •  ondansetron **OR** ondansetron ODT **OR** ondansetron  •  oxyCODONE  •  potassium chloride  •  promethazine  •  promethazine  •  sodium chloride  •  Insert peripheral IV **AND** sodium chloride      Principal Problem:    UGI bleed  Active Problems:    Abdominal pain    Cirrhosis of liver (CMS/HCC)    Type 2 diabetes mellitus, uncontrolled, with neuropathy (CMS/HCC)    Gastroparesis    Rheumatoid arthritis (CMS/HCC)    Anxiety and depression    Intractable vomiting with nausea        Assessment/Plan:  Duodenitis -   -protonix and carafate  -prednisone maybe contributing but needs the prednisone for platelets  -EGD shows Grade I esophageal varices but o/w unremarkable    Prolonged qtc- better on the monitor      Abdominal pain- slowly improving      Cirrhosis of liver (CMS/HCC)      Type 2 diabetes mellitus, uncontrolled, with neuropathy (CMS/HCC)  -blood sugars are good and endrinology is following      Gastroparesis  -stable  -unable to take reglan      Rheumatoid arthritis (CMS/HCC)  -patient is followed by Dr Munoz at [place], not able to take embrel because of low grade fevers at home which have been present for several months, last took embrel 8-9 months ago, arthritis in the hand and  feet stable, raynauds at times so has to use gloves.      Anxiety and depression-cymbalta, bupropion  -decline talking to access  - no thoughts of hurting yourselves    Seizure d/o keppra, no seizures      Intractable vomiting with nausea-last vomiting in er  -spitting up today  -tiagen  -patient concerned that constipation contributing to nausea    Pancytopenia and the patient is followed by Dr. Mcdowell.  CBC group has seen  -anc is low  -plts low    Urinary retention-phipps cath in place, urology to see    dvt prophylaxis-scd, platelets low but stable, CBC group added lovenox        Jose Almanza MD  10/08/18  8:28 AM

## 2018-10-08 NOTE — CONSULTS
FIRST UROLOGY CONSULT      Patient Identification:  NAME:  Silvia Zabala  Age:  58 y.o.   Sex:  female   :  1960   MRN:  [id number]       Chief complaint: retention of urine    History of present illness:  58-year-old female with diabetes multiple other health issues and now likely DUKES is found to be in retention.  She has not been voiding much just dribbles at a time and had a Rome catheter placed couple days ago.  She's never seen urology before.  She has a little bit of a weak stream at home and but no incontinence.  She has minimal frequency and urgency really no bothersome symptoms at baseline.  She denies any gross hematuria in the past she denies any recurrent UTIs.  No prior bladder suspension.      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   • Cirrhosis (CMS/HCC)    • Colon polyps    • Deafness    • Depression    • Diabetes mellitus (CMS/HCC)     Adult onset, insulin requiring   • Duodenal ulcer with hemorrhage    • Esophageal varices determined by endoscopy (CMS/HCC)    • Fibromyalgia    • Gallbladder disease    • Gastric varices    • Gastroparesis    • Glaucoma    • Granuloma annulare    • H/O B12 deficiency    • H/O Bleeding ulcer     And gastroesophageal varices   • H/O mixed connective tissue disorder    • Hemorrhoids    • Hiatal hernia    • History of transfusion    • Hx of being hospitalized     In [place] for GI bleeding from ulcer and varices   • Hyperlipidemia    • Migraine    • DUKES (nonalcoholic steatohepatitis) 2016   • BRICE (obstructive sleep apnea)    • Pancreas divisum    • Pancytopenia (CMS/HCC)     Chronic, due to cirrhosis and hypersplenism   • Peptic ulcer disease     And esophageal varices   • PONV (postoperative nausea and vomiting)    • RA (rheumatoid  "arthritis) (CMS/HCC)    • Seizure disorder (CMS/HCC)     LAST ONE SEVERAL YEARS AGO   • Seizures (CMS/HCC)    • Systemic lupus (CMS/HCC)        Past surgical history:  Past Surgical History:   Procedure Laterality Date   • BLADDER REPAIR  1991   • CATARACT EXTRACTION     • CHOLECYSTECTOMY  1994   • ENDOSCOPY N/A 11/7/2016    Procedure: ESOPHAGOGASTRODUODENOSCOPY WITH COLD POLYPECTOMY, BANDING,  AND CLIPS TIMES 2;  Surgeon: Rich Coleman MD;  Location: [facility] ENDOSCOPY;  Service:    • ENDOSCOPY N/A 12/22/2016    Procedure: ESOPHAGOGASTRODUODENOSCOPY WITH ESOPHAGEAL BANDING. 5 BANDS FIRED, 4 BANDS ADHERERD TO MUCOSA;  Surgeon: Rich Coleman MD;  Location: [facility] ENDOSCOPY;  Service:    • ENDOSCOPY N/A 2/15/2017    Procedure: ESOPHAGOGASTRODUODENOSCOPY AT BEDSIDE with esophageal varices banding (3);  Surgeon: Rich Coleman MD;  Location: [facility] ENDOSCOPY;  Service:    • ENDOSCOPY N/A 4/6/2017    Procedure: ESOPHAGOGASTRODUODENOSCOPY WITH ESOPHAGEAL VARICES BANDING x2;  Surgeon: Rich Coleman MD;  Location: [facility] ENDOSCOPY;  Service:    • ENDOSCOPY N/A 11/24/2017    Procedure: ESOPHAGOGASTRODUODENOSCOPY with biopsies;  Surgeon: Rich Coleman MD;  Location: [facility] ENDOSCOPY;  Service:    • ENDOSCOPY N/A 10/5/2018    Procedure: ESOPHAGOGASTRODUODENOSCOPY;  Surgeon: Rich Coleman MD;  Location: [facility] ENDOSCOPY;  Service: Gastroenterology   • ENDOSCOPY AND COLONOSCOPY  2014    Dr. Rich Coleman with findings of candida esophagitis   • EYE SURGERY     • HYSTERECTOMY  1986    partial   • JOINT REPLACEMENT     • KNEE SURGERY Right 1995    \"right knee recontstruction\"   • LIVER BIOPSY  01/2015   • MASS EXCISION     • STOMACH SURGERY         Allergies:  Albuterol; Tramadol; Lactulose; and Quinine derivatives    Home medications:  Prescriptions Prior to Admission   Medication Sig Dispense Refill Last Dose   • acetaminophen (TYLENOL) 325 MG tablet Take 2 tablets by mouth Every 4 (Four) Hours As Needed for " Mild Pain .   Taking   • ALPRAZolam (XANAX) 0.5 MG tablet TAKE ONE TABLET BY MOUTH TWICE A DAY AS NEEDED FOR ANXIETY 60 tablet 1 Taking   • B-D UF III MINI PEN NEEDLES 31G X 5 MM misc USE TO INJECT 5 TO 6 TIMES PER  each 2 Taking   • buPROPion XL (WELLBUTRIN XL) 150 MG 24 hr tablet TAKE ONE TABLET BY MOUTH DAILY 90 tablet 3 Taking   • cyclobenzaprine (FLEXERIL) 5 MG tablet Take 1 tablet by mouth 3 (Three) Times a Day As Needed for Muscle Spasms.   Taking   • DULoxetine (CYMBALTA) 30 MG capsule TAKE THREE CAPSULES BY MOUTH DAILY 90 capsule 3 Taking   • ferrous sulfate 325 (65 FE) MG tablet TAKE ONE TABLET BY MOUTH TWICE A DAY 60 tablet 3 Taking   • folic acid (FOLVITE) 1 MG tablet TAKE ONE TABLET BY MOUTH DAILY 30 tablet 3 Taking   • hydrOXYzine (ATARAX) 25 MG tablet Take 50 mg by mouth 3 (Three) Times a Day As Needed for Itching.   Patient Taking Differently   • Insulin Regular Human, Conc, (HUMULIN R U-500 KWIKPEN) 500 UNIT/ML solution pen-injector CONCENTRATED injection Inject 0.18 mL under the skin into the appropriate area as directed 4 (Four) Times a Day. (Patient taking differently: Inject 65 Units under the skin into the appropriate area as directed 4 (Four) Times a Day Before Meals & at Bedtime.) 16 pen 11 Taking   • Multiple Vitamin (MULTI-VITAMIN PO) Take 1 tablet by mouth Daily.   Taking   • OxyCODONE HCl (OXAYDO) 7.5 MG tablet  Take 7.5 mg by mouth Every 8 (Eight) Hours As Needed.   Taking   • pantoprazole (PROTONIX) 40 MG EC tablet TAKE ONE TABLET BY MOUTH DAILY 30 tablet 3 Taking   • predniSONE (DELTASONE) 10 MG tablet Take 1 tablet by mouth Daily. 90 tablet 3 Taking   • promethazine (PHENERGAN) 25 MG tablet TAKE ONE TABLET BY MOUTH EVERY 6 HOURS AS NEEDED FOR NAUSEA OR VOMITING 30 tablet 0 Taking   • RELISTOR 150 MG tablet 450 mg Daily.   Taking   • spironolactone (ALDACTONE) 100 MG tablet TAKE ONE TABLET BY MOUTH DAILY 30 tablet 3 Taking   • sucralfate (CARAFATE) 1 g tablet TAKE ONE TABLET BY  MOUTH TWICE A DAY 60 tablet 1 Taking   • trimethobenzamide (TIGAN) 300 MG capsule Take 300 mg by mouth 3 (Three) Times a Day.   Taking   • XIFAXAN 550 MG tablet TAKE ONE TABLET BY MOUTH TWICE A DAY 60 tablet 3 Taking   • cycloSPORINE (RESTASIS) 0.05 % ophthalmic emulsion 1 drop 2 (two) times a day.   Taking   • vitamin B-12 (CYANOCOBALAMIN) 1000 MCG tablet Take 1,000 mcg by mouth daily.   Taking       Hospital medications:    buPROPion  mg Oral Daily   DULoxetine 90 mg Oral Daily   enoxaparin 40 mg Subcutaneous Q24H   folic acid 1,000 mcg Oral Daily   insulin aspart 0-10 Units Subcutaneous 4x Daily With Meals & Nightly   insulin aspart 8 Units Subcutaneous TID With Meals   insulin detemir 10 Units Subcutaneous Nightly   insulin detemir 40 Units Subcutaneous QAM   levETIRAcetam 500 mg Oral BID   pantoprazole 40 mg Intravenous Q AM   polyethylene glycol 17 g Oral Daily   predniSONE 10 mg Oral Daily   pregabalin 75 mg Oral BID   rifaximin 550 mg Oral BID   sodium chloride 3 mL Intravenous Q12H   spironolactone 100 mg Oral Daily   sucralfate 1 g Oral BID   trimethobenzamide 300 mg Oral TID        •  acetaminophen  •  ALPRAZolam  •  bisacodyl  •  cyclobenzaprine  •  dextrose  •  dextrose  •  glucagon (human recombinant)  •  hydrOXYzine  •  nitroglycerin  •  ondansetron **OR** ondansetron ODT **OR** ondansetron  •  oxyCODONE  •  potassium chloride  •  promethazine  •  promethazine  •  sodium chloride  •  Insert peripheral IV **AND** sodium chloride    Family history:  Family History   Problem Relation Age of Onset   • Liver disease Other    • Depression Other    • Heart disease Other    • Hypertension Other    • Diabetes Other    • Breast cancer Other    • Brain cancer Other    • Uterine cancer Other    • Colon cancer Other    • Leukemia Other    • Colon cancer Maternal Aunt    • Hypertension Mother    • Diabetes type II Mother    • Rheum arthritis Mother    • Liver disease Mother         DUKES   • Heart disease  Father    • Diabetes type II Father    • Diabetes type II Sister    • Lupus Sister    • Malig Hyperthermia Neg Hx        Social history:  Social History   Substance Use Topics   • Smoking status: Former Smoker     Packs/day: 0.00     Years: 0.00     Quit date: 2015   • Smokeless tobacco: Never Used   • Alcohol use No       Review of systems:    Negative 12-system ROS except for the following:  Bowels are irregular      Objective:  TMax 24 hours:   Temp (24hrs), Av.5 °F (36.9 °C), Min:98.3 °F (36.8 °C), Max:98.6 °F (37 °C)      Vitals Ranges:   Temp:  [98.3 °F (36.8 °C)-98.6 °F (37 °C)] 98.6 °F (37 °C)  Heart Rate:  [] 93  Resp:  [16-20] 16  BP: (125-132)/(73-78) 130/78    Intake/Output Last 3 shifts:  I/O last 3 completed shifts:  In:  [I.V.:]  Out: 4950 [Urine:4950]     Physical Exam:       General Appearance:    Alert, cooperative, in no acute distress   Head:    Normocephalic, without obvious abnormality, atraumatic   Eyes:          PERRL, conjunctivae and corneas clear   Ears:    Normal external inspection   Throat:   No oral lesions, oral mucosa moist   Neck:   Supple, no LAD, trachea midline   Back:     No CVA tenderness   Lungs:     Respirations unlabored, symmetric excursion    Heart:    RRR, intact peripheral pulses   Abdomen:     Soft, NDNT, no masses, no guarding   :    def   Extremities:   No edema, no deformity   Skin:   No bleeding, bruising or rashes   Neuro/Psych:   Orientation intact, mood/affect pleasant, no focal findings       Results review:   I reviewed the patient's new clinical results.    Data review:  Lab Results (last 24 hours)     Procedure Component Value Units Date/Time    POC Glucose Once [[id number]]  (Abnormal) Collected:  10/08/18 1151    Specimen:  Blood Updated:  10/08/18 1155     Glucose 178 (H) mg/dL     Narrative:       Meter: [id number] : [id number] Miller NOE    POC Glucose Once [[id number]]  (Normal) Collected:  10/08/18 0646    Specimen:   Blood Updated:  10/08/18 0646     Glucose 102 mg/dL     Narrative:       Meter: [id number] : [id number] Nikita Keyes CNA    Comprehensive Metabolic Panel [[id number]]  (Abnormal) Collected:  10/08/18 0504    Specimen:  Blood Updated:  10/08/18 0556     Glucose 122 (H) mg/dL      BUN 5 (L) mg/dL      Creatinine 0.68 mg/dL      Sodium 142 mmol/L      Potassium 3.7 mmol/L      Chloride 108 (H) mmol/L      CO2 24.2 mmol/L      Calcium 8.8 mg/dL      Total Protein 6.0 g/dL      Albumin 3.20 (L) g/dL      ALT (SGPT) 17 U/L      AST (SGOT) 26 U/L      Alkaline Phosphatase 139 (H) U/L      Total Bilirubin 0.5 mg/dL      eGFR Non African Amer 90 mL/min/1.73      Globulin 2.8 gm/dL      A/G Ratio 1.1 g/dL      BUN/Creatinine Ratio 7.4     Anion Gap 9.8 mmol/L     CBC & Differential [[id number]] Collected:  10/08/18 0504    Specimen:  Blood Updated:  10/08/18 0536    Narrative:       The following orders were created for panel order CBC & Differential.  Procedure                               Abnormality         Status                     ---------                               -----------         ------                     CBC Auto Differential[[id number]]        Abnormal            Final result                 Please view results for these tests on the individual orders.    CBC Auto Differential [[id number]]  (Abnormal) Collected:  10/08/18 0504    Specimen:  Blood Updated:  10/08/18 0536     WBC 1.83 (C) 10*3/mm3      RBC 3.57 (L) 10*6/mm3      Hemoglobin 9.6 (L) g/dL      Hematocrit 30.8 (L) %      MCV 86.3 fL      MCH 26.9 pg      MCHC 31.2 (L) g/dL      RDW 16.2 (H) %      RDW-SD 50.8 fl      MPV 10.2 fL      Platelets 83 (L) 10*3/mm3      Neutrophil % 50.2 %      Lymphocyte % 27.9 %      Monocyte % 15.3 (H) %      Eosinophil % 4.4 %      Basophil % 1.1 %      Immature Grans % 1.1 (H) %      Neutrophils, Absolute 0.92 (C) 10*3/mm3      Lymphocytes, Absolute 0.51 (L) 10*3/mm3      Monocytes, Absolute 0.28 10*3/mm3       Eosinophils, Absolute 0.08 10*3/mm3      Basophils, Absolute 0.02 10*3/mm3      Immature Grans, Absolute 0.02 10*3/mm3     POC Glucose Once [[id number]]  (Abnormal) Collected:  10/07/18 2104    Specimen:  Blood Updated:  10/07/18 2105     Glucose 278 (H) mg/dL     Narrative:       Meter: [id number] : [id number] Nikita Keyes CNA    POC Glucose Once [[id number]]  (Abnormal) Collected:  10/07/18 1650    Specimen:  Blood Updated:  10/07/18 1653     Glucose 206 (H) mg/dL     Narrative:       Meter: [id number] : [id number] Harsha JOE           Imaging:  Imaging Results (last 24 hours)     ** No results found for the last 24 hours. **             Assessment:     Principal Problem:    UGI bleed  Active Problems:    Cirrhosis of liver (CMS/HCC)    Rheumatoid arthritis (CMS/HCC)    Anxiety and depression    Type 2 diabetes mellitus, uncontrolled, with neuropathy (CMS/HCC)    Abdominal pain    Gastroparesis    Intractable vomiting with nausea    Urinary retention- may be related to her diabetes with sensory neurogenic bladder.    Plan:     Is a transient phenomenon.  We'll give her a voiding trial tomorrow.  I'll review her medications.    Abdelrahman Mendoza MD  10/08/18  12:44 PM

## 2018-10-08 NOTE — PLAN OF CARE
Problem: Patient Care Overview  Goal: Plan of Care Review  Outcome: Ongoing (interventions implemented as appropriate)   10/07/18 1822 10/08/18 0050 10/08/18 0358   Plan of Care Review   Progress improving --  --    OTHER   Outcome Summary --  --  Pt resting well, requesting prn meds as needed, CMP this am, will continue to closely monitor.   Coping/Psychosocial   Plan of Care Reviewed With --  patient --      Goal: Individualization and Mutuality  Outcome: Ongoing (interventions implemented as appropriate)    Goal: Discharge Needs Assessment  Outcome: Ongoing (interventions implemented as appropriate)    Goal: Interprofessional Rounds/Family Conf  Outcome: Ongoing (interventions implemented as appropriate)      Problem: Fall Risk (Adult)  Goal: Identify Related Risk Factors and Signs and Symptoms  Outcome: Ongoing (interventions implemented as appropriate)    Goal: Absence of Fall  Outcome: Ongoing (interventions implemented as appropriate)      Problem: Nausea/Vomiting (Adult)  Goal: Identify Related Risk Factors and Signs and Symptoms  Outcome: Ongoing (interventions implemented as appropriate)    Goal: Symptom Relief  Outcome: Ongoing (interventions implemented as appropriate)    Goal: Adequate Hydration  Outcome: Ongoing (interventions implemented as appropriate)      Problem: Skin Injury Risk (Adult)  Goal: Identify Related Risk Factors and Signs and Symptoms  Outcome: Ongoing (interventions implemented as appropriate)    Goal: Skin Health and Integrity  Outcome: Ongoing (interventions implemented as appropriate)

## 2018-10-09 LAB
BASOPHILS # BLD AUTO: 0.02 10*3/MM3 (ref 0–0.2)
BASOPHILS NFR BLD AUTO: 1.2 % (ref 0–1.5)
DEPRECATED RDW RBC AUTO: 50.1 FL (ref 37–54)
EOSINOPHIL # BLD AUTO: 0.07 10*3/MM3 (ref 0–0.7)
EOSINOPHIL NFR BLD AUTO: 4.2 % (ref 0.3–6.2)
ERYTHROCYTE [DISTWIDTH] IN BLOOD BY AUTOMATED COUNT: 16.1 % (ref 11.7–13)
GLUCOSE BLDC GLUCOMTR-MCNC: 162 MG/DL (ref 70–130)
GLUCOSE BLDC GLUCOMTR-MCNC: 171 MG/DL (ref 70–130)
GLUCOSE BLDC GLUCOMTR-MCNC: 219 MG/DL (ref 70–130)
GLUCOSE BLDC GLUCOMTR-MCNC: 337 MG/DL (ref 70–130)
HCT VFR BLD AUTO: 29.9 % (ref 35.6–45.5)
HGB BLD-MCNC: 9.5 G/DL (ref 11.9–15.5)
IMM GRANULOCYTES # BLD: 0.02 10*3/MM3 (ref 0–0.03)
IMM GRANULOCYTES NFR BLD: 1.2 % (ref 0–0.5)
LYMPHOCYTES # BLD AUTO: 0.44 10*3/MM3 (ref 0.9–4.8)
LYMPHOCYTES NFR BLD AUTO: 26.5 % (ref 19.6–45.3)
MCH RBC QN AUTO: 27.2 PG (ref 26.9–32)
MCHC RBC AUTO-ENTMCNC: 31.8 G/DL (ref 32.4–36.3)
MCV RBC AUTO: 85.7 FL (ref 80.5–98.2)
MONOCYTES # BLD AUTO: 0.27 10*3/MM3 (ref 0.2–1.2)
MONOCYTES NFR BLD AUTO: 16.3 % (ref 5–12)
NEUTROPHILS # BLD AUTO: 0.86 10*3/MM3 (ref 1.9–8.1)
NEUTROPHILS NFR BLD AUTO: 51.8 % (ref 42.7–76)
PLATELET # BLD AUTO: 79 10*3/MM3 (ref 140–500)
PMV BLD AUTO: 10.3 FL (ref 6–12)
RBC # BLD AUTO: 3.49 10*6/MM3 (ref 3.9–5.2)
WBC NRBC COR # BLD: 1.66 10*3/MM3 (ref 4.5–10.7)

## 2018-10-09 PROCEDURE — 25010000002 ENOXAPARIN PER 10 MG: Performed by: INTERNAL MEDICINE

## 2018-10-09 PROCEDURE — 99232 SBSQ HOSP IP/OBS MODERATE 35: CPT | Performed by: INTERNAL MEDICINE

## 2018-10-09 PROCEDURE — 63710000001 INSULIN ASPART PER 5 UNITS: Performed by: INTERNAL MEDICINE

## 2018-10-09 PROCEDURE — 82962 GLUCOSE BLOOD TEST: CPT

## 2018-10-09 PROCEDURE — 63710000001 PROMETHAZINE PER 25 MG: Performed by: HOSPITALIST

## 2018-10-09 PROCEDURE — 99231 SBSQ HOSP IP/OBS SF/LOW 25: CPT | Performed by: INTERNAL MEDICINE

## 2018-10-09 PROCEDURE — 63710000001 INSULIN ASPART PER 5 UNITS: Performed by: HOSPITALIST

## 2018-10-09 PROCEDURE — 63710000001 INSULIN DETEMIR PER 5 UNITS: Performed by: INTERNAL MEDICINE

## 2018-10-09 PROCEDURE — 63710000001 PREDNISONE PER 5 MG: Performed by: HOSPITALIST

## 2018-10-09 PROCEDURE — 85025 COMPLETE CBC W/AUTO DIFF WBC: CPT | Performed by: INTERNAL MEDICINE

## 2018-10-09 RX ORDER — BUTALBITAL, ACETAMINOPHEN AND CAFFEINE 50; 325; 40 MG/1; MG/1; MG/1
2 TABLET ORAL ONCE
Status: COMPLETED | OUTPATIENT
Start: 2018-10-09 | End: 2018-10-09

## 2018-10-09 RX ORDER — BUTALBITAL, ACETAMINOPHEN AND CAFFEINE 50; 325; 40 MG/1; MG/1; MG/1
1 TABLET ORAL EVERY 6 HOURS PRN
Status: DISCONTINUED | OUTPATIENT
Start: 2018-10-09 | End: 2018-10-11 | Stop reason: HOSPADM

## 2018-10-09 RX ADMIN — OXYCODONE HYDROCHLORIDE 10 MG: 5 TABLET ORAL at 12:08

## 2018-10-09 RX ADMIN — OXYCODONE HYDROCHLORIDE 10 MG: 5 TABLET ORAL at 20:26

## 2018-10-09 RX ADMIN — OXYCODONE HYDROCHLORIDE 10 MG: 5 TABLET ORAL at 16:26

## 2018-10-09 RX ADMIN — POLYETHYLENE GLYCOL 3350 17 G: 17 POWDER, FOR SOLUTION ORAL at 08:06

## 2018-10-09 RX ADMIN — INSULIN ASPART 8 UNITS: 100 INJECTION, SOLUTION INTRAVENOUS; SUBCUTANEOUS at 12:08

## 2018-10-09 RX ADMIN — Medication 3 ML: at 08:06

## 2018-10-09 RX ADMIN — SUCRALFATE 1 G: 1 TABLET ORAL at 20:26

## 2018-10-09 RX ADMIN — BUTALBITAL, ACETAMINOPHEN, AND CAFFEINE 2 TABLET: 50; 325; 40 TABLET ORAL at 18:22

## 2018-10-09 RX ADMIN — HYDROXYZINE HYDROCHLORIDE 50 MG: 50 TABLET, FILM COATED ORAL at 14:23

## 2018-10-09 RX ADMIN — ENOXAPARIN SODIUM 40 MG: 40 INJECTION SUBCUTANEOUS at 12:08

## 2018-10-09 RX ADMIN — DULOXETINE HYDROCHLORIDE 90 MG: 60 CAPSULE, DELAYED RELEASE ORAL at 08:07

## 2018-10-09 RX ADMIN — INSULIN DETEMIR 15 UNITS: 100 INJECTION, SOLUTION SUBCUTANEOUS at 23:01

## 2018-10-09 RX ADMIN — BUTALBITAL, ACETAMINOPHEN, AND CAFFEINE 1 TABLET: 50; 325; 40 TABLET ORAL at 20:26

## 2018-10-09 RX ADMIN — OXYCODONE HYDROCHLORIDE 10 MG: 5 TABLET ORAL at 08:06

## 2018-10-09 RX ADMIN — INSULIN ASPART 10 UNITS: 100 INJECTION, SOLUTION INTRAVENOUS; SUBCUTANEOUS at 18:05

## 2018-10-09 RX ADMIN — INSULIN ASPART 2 UNITS: 100 INJECTION, SOLUTION INTRAVENOUS; SUBCUTANEOUS at 18:06

## 2018-10-09 RX ADMIN — PROMETHAZINE HYDROCHLORIDE 25 MG: 25 TABLET ORAL at 14:23

## 2018-10-09 RX ADMIN — SUCRALFATE 1 G: 1 TABLET ORAL at 08:07

## 2018-10-09 RX ADMIN — TRIMETHOBENZAMIDE HYDROCHLORIDE 300 MG: 300 CAPSULE ORAL at 16:26

## 2018-10-09 RX ADMIN — PREGABALIN 75 MG: 75 CAPSULE ORAL at 20:26

## 2018-10-09 RX ADMIN — TRIMETHOBENZAMIDE HYDROCHLORIDE 300 MG: 300 CAPSULE ORAL at 20:26

## 2018-10-09 RX ADMIN — Medication 3 ML: at 23:05

## 2018-10-09 RX ADMIN — INSULIN ASPART 8 UNITS: 100 INJECTION, SOLUTION INTRAVENOUS; SUBCUTANEOUS at 23:01

## 2018-10-09 RX ADMIN — LEVETIRACETAM 500 MG: 500 TABLET, FILM COATED ORAL at 08:07

## 2018-10-09 RX ADMIN — INSULIN ASPART 8 UNITS: 100 INJECTION, SOLUTION INTRAVENOUS; SUBCUTANEOUS at 08:07

## 2018-10-09 RX ADMIN — INSULIN DETEMIR 40 UNITS: 100 INJECTION, SOLUTION SUBCUTANEOUS at 06:07

## 2018-10-09 RX ADMIN — RIFAXIMIN 550 MG: 550 TABLET ORAL at 08:06

## 2018-10-09 RX ADMIN — SPIRONOLACTONE 100 MG: 100 TABLET ORAL at 08:06

## 2018-10-09 RX ADMIN — PREGABALIN 75 MG: 75 CAPSULE ORAL at 08:07

## 2018-10-09 RX ADMIN — FOLIC ACID 1000 MCG: 1 TABLET ORAL at 08:07

## 2018-10-09 RX ADMIN — LEVETIRACETAM 500 MG: 500 TABLET, FILM COATED ORAL at 20:26

## 2018-10-09 RX ADMIN — RIFAXIMIN 550 MG: 550 TABLET ORAL at 20:26

## 2018-10-09 RX ADMIN — BUPROPION HYDROCHLORIDE 150 MG: 150 TABLET, FILM COATED, EXTENDED RELEASE ORAL at 08:07

## 2018-10-09 RX ADMIN — PROMETHAZINE HYDROCHLORIDE 25 MG: 25 TABLET ORAL at 06:32

## 2018-10-09 RX ADMIN — INSULIN ASPART 2 UNITS: 100 INJECTION, SOLUTION INTRAVENOUS; SUBCUTANEOUS at 08:07

## 2018-10-09 RX ADMIN — PREDNISONE 10 MG: 10 TABLET ORAL at 08:07

## 2018-10-09 RX ADMIN — TRIMETHOBENZAMIDE HYDROCHLORIDE 300 MG: 300 CAPSULE ORAL at 08:07

## 2018-10-09 RX ADMIN — PANTOPRAZOLE SODIUM 40 MG: 40 INJECTION, POWDER, FOR SOLUTION INTRAVENOUS at 06:07

## 2018-10-09 RX ADMIN — INSULIN ASPART 4 UNITS: 100 INJECTION, SOLUTION INTRAVENOUS; SUBCUTANEOUS at 12:09

## 2018-10-09 NOTE — PROGRESS NOTES
"[facility] INPATIENT PROGRESS NOTE  Subjective     CC: Pancytopenia secondary to cirrhosis/splenomegaly but with history of ITP in May 2018    Interval history: No bleeding. Abd pain is improving with decreased \"pressure.\" Ambulating.     General ROS: positive for  - fatigue  negative for - chills or hot flashes  Cardiovascular ROS: no chest pain or dyspnea on exertion     Medications:  The current medication list was reviewed in the EMR.    Allergies:    Allergies   Allergen Reactions   • Albuterol Anaphylaxis   • Tramadol Nausea Only, Other (See Comments) and GI Intolerance     Per pt causes her \"palsy, meaning shaking and tremors\"    • Lactulose Nausea And Vomiting and Other (See Comments)     Severe abdominal pain   • Quinine Derivatives Nausea And Vomiting       Objective      Vitals:    10/09/18 1359   BP: 135/79   Pulse: 93   Resp: 18   Temp: 98.5 °F (36.9 °C)   SpO2: 95%        Physical exam:   GENERAL: female comfortable, no acute distress  SKIN:  Warm, without rashes, purpura or petechiae.   EYES:  EOMs intact.  Conjunctivae normal. Pupils equal and reactive to light.   EARS:  Hearing intact.  LYMPHATICS:  No cervical, supraclavicular, axillary adenopathy.  RESP:  Lungs clear to percussion and auscultation. Good airflow. Normal effort.   CARDIAC:  Regular rate and rhythm without murmurs, rubs or gallops. Normal S1,S2. Lower extremity edema:  No  GI:  Soft, mildly distended with no clear fluid wave, #S gastroparesis  PSYCHIATRIC:  Normal affect and mood; alert and oriented x 3; Insight and judgement appropriate            RECENT LABS:      Results from last 7 days  Lab Units 10/09/18  0443 10/08/18  0504 10/07/18  0457   WBC 10*3/mm3 1.66* 1.83* 1.79*   HEMOGLOBIN g/dL 9.5* 9.6* 9.9*   HEMATOCRIT % 29.9* 30.8* 32.1*   PLATELETS 10*3/mm3 79* 83* 85*       Results from last 7 days  Lab Units 10/08/18  0504 10/05/18  0352 10/04/18  0900   SODIUM mmol/L 142 138 139   POTASSIUM mmol/L 3.7 3.7 4.1 "   CHLORIDE mmol/L 108* 106 107   CO2 mmol/L 24.2 18.6* 17.9*   BUN mg/dL 5* 7 9   CREATININE mg/dL 0.68 0.64 0.80   CALCIUM mg/dL 8.8 8.3* 8.3*   BILIRUBIN mg/dL 0.5 1.2 1.5*   ALK PHOS U/L 139* 168* 177*   ALT (SGPT) U/L 17 19 23   AST (SGOT) U/L 26 30 30   GLUCOSE mg/dL 122* 141* 204*           Assessment/Plan   This is a 58 y.o. female with:     1. Chronic pancytopenia due to hypersplenism and cirrhosis              * No treatment; monitor.                  2. Recent ITP, in May 2018 presenting with platelet count of 3.               * status post 5 doses IVIG and discharged with Prednisone taper.               * Remains on prednisone 10 mg daily.               * Agree that current level of thrombocytopenia is secondary to her liver disease rather than ITP. Monitor.      3. Abd pain. Per GI. EGD unremarkable.   4. Rheumatoid arthritis  5. 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 DVT ppx: OK to resume DVT ppx with lovneox as long as platelets greater than 60K    7.  No additional recommendations at this point.  Please contact if we can be of further assistance.                Logan Copeland MD  10/9/2018  3:09 PM

## 2018-10-09 NOTE — PROGRESS NOTES
58 y.o.   LOS: 6 days   Patient Care Team:  Blanca Pitts MD as PCP - General (Internal Medicine)  Blanca Pitts MD as PCP - Claims Attributed  Sukhdeep Mcdowell MD as Consulting Physician (Hematology and Oncology)  Blanca Pitts MD as Referring Physician (Internal Medicine)  Rich Coleman MD as Consulting Physician (Gastroenterology)  Merary Burnett MD as Consulting Physician (Endocrinology)    Chief Complaint:     Chief Complaint   Patient presents with   • Vomiting   • Nausea   • Chest Pain   • Shortness of Breath       Subjective   Patient reports that she is eating well.  She is not back to baseline but some improvement in how she has been doing.  Also noted that her blood sugars are slightly trending up.    Interval History:    Review of Systems:   Review of Systems   Constitutional: Positive for appetite change and fatigue. Negative for fever.   Respiratory: Negative for shortness of breath.    Cardiovascular: Negative for chest pain.   Gastrointestinal: Positive for abdominal distention. Negative for diarrhea and vomiting.   Endocrine: Negative for polydipsia and polyuria.   Genitourinary: Negative for flank pain.   Musculoskeletal: Positive for arthralgias and myalgias.   Skin: Negative for pallor.   Neurological: Positive for weakness. Negative for numbness.   Psychiatric/Behavioral: Negative for agitation.     Objective     Vital Signs   Temp:  [98 °F (36.7 °C)-99 °F (37.2 °C)] 98.5 °F (36.9 °C)  Heart Rate:  [] 93  Resp:  [16-18] 18  BP: (121-139)/(57-93) 135/79    Physical Exam:  Physical Exam   Constitutional: She is oriented to person, place, and time. She appears well-nourished.   Obese     HENT:   Head: Normocephalic and atraumatic.   Wide neck   Eyes: Conjunctivae and EOM are normal. No scleral icterus.   Neck: Normal range of motion. Neck supple. No thyromegaly present.   Acanthosis nigricans   Cardiovascular: Normal rate and normal heart sounds.    Pulmonary/Chest: Effort normal  and breath sounds normal. No stridor. She has no wheezes.   Abdominal: Soft. Bowel sounds are normal. She exhibits distension. There is no tenderness.   Central obesity   Musculoskeletal: She exhibits edema. She exhibits no tenderness.   Neurological: She is alert and oriented to person, place, and time.   Skin: Skin is warm and dry. She is not diaphoretic.   Psychiatric: She has a normal mood and affect.   Vitals reviewed.  Results Review:     I reviewed the patient's new clinical results and summarized them in subjective and in plan.      Glucose   Date/Time Value Ref Range Status   10/08/2018 0504 122 (H) 65 - 99 mg/dL Final     Lab Results (last 24 hours)     Procedure Component Value Units Date/Time    POC Glucose Once [[id number]]  (Abnormal) Collected:  10/09/18 1114    Specimen:  Blood Updated:  10/09/18 1116     Glucose 219 (H) mg/dL     Narrative:       Meter: [id number] : [id number] Miller NOE    POC Glucose Once [[id number]]  (Abnormal) Collected:  10/09/18 0629    Specimen:  Blood Updated:  10/09/18 0630     Glucose 171 (H) mg/dL     Narrative:       Meter: [id number] : [id number] Natalya Starra MERISSA    CBC & Differential [[id number]] Collected:  10/09/18 0443    Specimen:  Blood Updated:  10/09/18 0617    Narrative:       The following orders were created for panel order CBC & Differential.  Procedure                               Abnormality         Status                     ---------                               -----------         ------                     CBC Auto Differential[[id number]]        Abnormal            Final result                 Please view results for these tests on the individual orders.    CBC Auto Differential [[id number]]  (Abnormal) Collected:  10/09/18 0443    Specimen:  Blood Updated:  10/09/18 0617     WBC 1.66 (C) 10*3/mm3      RBC 3.49 (L) 10*6/mm3      Hemoglobin 9.5 (L) g/dL      Hematocrit 29.9 (L) %      MCV 85.7 fL      MCH 27.2 pg      MCHC 31.8 (L) g/dL       RDW 16.1 (H) %      RDW-SD 50.1 fl      MPV 10.3 fL      Platelets 79 (L) 10*3/mm3      Neutrophil % 51.8 %      Lymphocyte % 26.5 %      Monocyte % 16.3 (H) %      Eosinophil % 4.2 %      Basophil % 1.2 %      Immature Grans % 1.2 (H) %      Neutrophils, Absolute 0.86 (C) 10*3/mm3      Lymphocytes, Absolute 0.44 (L) 10*3/mm3      Monocytes, Absolute 0.27 10*3/mm3      Eosinophils, Absolute 0.07 10*3/mm3      Basophils, Absolute 0.02 10*3/mm3      Immature Grans, Absolute 0.02 10*3/mm3     POC Glucose Once [[id number]]  (Abnormal) Collected:  10/08/18 2104    Specimen:  Blood Updated:  10/08/18 2105     Glucose 287 (H) mg/dL     Narrative:       Meter: [id number] : [id number] Smyzer Lisa CNA    POC Glucose Once [[id number]]  (Abnormal) Collected:  10/08/18 1622    Specimen:  Blood Updated:  10/08/18 1626     Glucose 184 (H) mg/dL     Narrative:       Meter: [id number] : [id number] Accjosias Presley NA        Imaging Results (last 24 hours)     ** No results found for the last 24 hours. **          Medication Review: DONE      Current Facility-Administered Medications:   •  acetaminophen (TYLENOL) tablet 650 mg, 650 mg, Oral, Q4H PRN, Karlene Del Cid, APRN  •  ALPRAZolam (XANAX) tablet 0.5 mg, 0.5 mg, Oral, BID PRN, Jose Almanza MD, 0.5 mg at 10/08/18 2055  •  bisacodyl (DULCOLAX) suppository 10 mg, 10 mg, Rectal, Daily PRN, Jose Almanza MD, 10 mg at 10/06/18 1717  •  buPROPion XL (WELLBUTRIN XL) 24 hr tablet 150 mg, 150 mg, Oral, Daily, Jose Almanza MD, 150 mg at 10/09/18 0807  •  cyclobenzaprine (FLEXERIL) tablet 5 mg, 5 mg, Oral, TID PRN, Jose Almanza MD, 5 mg at 10/04/18 1137  •  dextrose (D50W) 25 g/ 50mL Intravenous Solution 25 g, 25 g, Intravenous, Q15 Min PRN, Karlene Del Cid APRN  •  dextrose (GLUTOSE) oral gel 15 g, 15 g, Oral, Q15 Min PRN, Karlene Del Cid APRN  •  DULoxetine (CYMBALTA) DR capsule 90 mg, 90 mg, Oral, Daily, Jose Almanza MD, 90 mg at 10/09/18 0807  •   enoxaparin (LOVENOX) syringe 40 mg, 40 mg, Subcutaneous, Q24H, Raegan Reid MD, Stopped at 10/10/18 1230  •  folic acid (FOLVITE) tablet 1,000 mcg, 1,000 mcg, Oral, Daily, Jose Almanza MD, 1,000 mcg at 10/09/18 0807  •  glucagon (human recombinant) (GLUCAGEN DIAGNOSTIC) injection 1 mg, 1 mg, Subcutaneous, PRN, Karlene Del Cid APRN  •  hydrOXYzine (ATARAX) tablet 50 mg, 50 mg, Oral, TID PRN, Jose Almanza MD, 50 mg at 10/09/18 1423  •  insulin aspart (novoLOG) injection 0-10 Units, 0-10 Units, Subcutaneous, 4x Daily With Meals & Nightly, Merary Burnett MD, 4 Units at 10/09/18 1209  •  insulin aspart (novoLOG) injection 10 Units, 10 Units, Subcutaneous, TID With Meals, Merary Burnett MD  •  insulin detemir (LEVEMIR) injection 15 Units, 15 Units, Subcutaneous, Nightly, Merary Burnett MD  •  insulin detemir (LEVEMIR) injection 40 Units, 40 Units, Subcutaneous, QAM, Merary Burnett MD, 40 Units at 10/09/18 0607  •  levETIRAcetam (KEPPRA) tablet 500 mg, 500 mg, Oral, BID, Jose Almanza MD, 500 mg at 10/09/18 0807  •  nitroglycerin (NITROSTAT) SL tablet 0.4 mg, 0.4 mg, Sublingual, Q5 Min PRN, Karlene Del Cid, APRN  •  ondansetron (ZOFRAN) tablet 4 mg, 4 mg, Oral, Q6H PRN **OR** ondansetron ODT (ZOFRAN-ODT) disintegrating tablet 4 mg, 4 mg, Oral, Q6H PRN **OR** ondansetron (ZOFRAN) injection 4 mg, 4 mg, Intravenous, Q6H PRN, Karlene Del Cid, APRN, 4 mg at 10/05/18 1350  •  oxyCODONE (ROXICODONE) immediate release tablet 10 mg, 10 mg, Oral, Q4H PRN, Jose Almanza MD, 10 mg at 10/09/18 1208  •  pantoprazole (PROTONIX) injection 40 mg, 40 mg, Intravenous, Q AM, Jose Almanza MD, 40 mg at 10/09/18 0607  •  polyethylene glycol 3350 powder (packet), 17 g, Oral, Daily, Jose Almanza MD, 17 g at 10/09/18 0806  •  potassium chloride 10 mEq in 100 mL IVPB, 10 mEq, Intravenous, Q1H PRN, Jose Almanza MD  •  predniSONE (DELTASONE) tablet 10 mg, 10 mg, Oral, Daily, Jose Almanza MD, 10  mg at 10/09/18 0807  •  pregabalin (LYRICA) capsule 75 mg, 75 mg, Oral, BID, Jose Almanza MD, 75 mg at 10/09/18 0807  •  promethazine (PHENERGAN) injection 12.5 mg, 12.5 mg, Intravenous, Q6H PRN, Karlene Del Cid, APRN, 12.5 mg at 10/04/18 1702  •  promethazine (PHENERGAN) tablet 25 mg, 25 mg, Oral, Q6H PRN, Jose Almanza MD, 25 mg at 10/09/18 1423  •  rifaximin (XIFAXAN) tablet 550 mg, 550 mg, Oral, BID, Jose Almanza MD, 550 mg at 10/09/18 0806  •  sodium chloride 0.9 % flush 1-10 mL, 1-10 mL, Intravenous, PRN, Karlene Del Cid, APRN  •  Insert peripheral IV, , , Once **AND** sodium chloride 0.9 % flush 10 mL, 10 mL, Intravenous, PRN, Reynaldo Albright PA  •  sodium chloride 0.9 % flush 3 mL, 3 mL, Intravenous, Q12H, Karlene Del Cid APRN, 3 mL at 10/09/18 0806  •  spironolactone (ALDACTONE) tablet 100 mg, 100 mg, Oral, Daily, Jose Almanza MD, 100 mg at 10/09/18 0806  •  sucralfate (CARAFATE) tablet 1 g, 1 g, Oral, BID, Jose Almanza MD, 1 g at 10/09/18 0807  •  trimethobenzamide (TIGAN) capsule 300 mg, 300 mg, Oral, TID, Jose Almanza MD, 300 mg at 10/09/18 0807    Assessment/Plan     Active Hospital Problems    Diagnosis Date Noted   • **UGI bleed [K92.2] 10/04/2018   • Intractable vomiting with nausea [R11.2] 10/03/2018   • Gastroparesis [K31.84] 10/17/2017   • Abdominal pain [R10.9] 02/14/2017   • Type 2 diabetes mellitus, uncontrolled, with neuropathy (CMS/HCC) [E11.40, E11.65] 03/15/2016   • Cirrhosis of liver (CMS/HCC) [K74.60] 03/08/2016   • Rheumatoid arthritis (CMS/HCC) [M06.9] 03/08/2016   • Anxiety and depression [F41.9, F32.9] 03/08/2016      Resolved Hospital Problems    Diagnosis Date Noted Date Resolved   No resolved problems to display.     Type 2 diabetes mellitus-significantly uncontrolled  Continue levemir 40 units in the morning  Increase levemir to 15 units at bedtime  Increase NovoLog to 10 units with each meal along with holding parameters  Continue NovoLog sliding  "scale 3 times a day before meals and at bedtime.    Currently holding patient's U-500 insulin.    She is on prednisone 10 mg oral daily.    TSH - 3.2 from July 2018.     Discussed plan with Nurse.     Merary Burnett MD.  10/09/18  11:37 AM      EM[facility] Dragon / transcription disclaimer:    \"Dictated utilizing Dragon dictation\".   "

## 2018-10-09 NOTE — PLAN OF CARE
Problem: Patient Care Overview  Goal: Plan of Care Review  Outcome: Ongoing (interventions implemented as appropriate)   10/09/18 1537   Plan of Care Review   Progress no change   OTHER   Outcome Summary pt c/o migraine all day, prn meds given, paracentesis ordered for tomorrow, possible d/c after paracentesis, VSS, will continue to monitor closely    Coping/Psychosocial   Plan of Care Reviewed With patient     Goal: Individualization and Mutuality  Outcome: Ongoing (interventions implemented as appropriate)    Goal: Discharge Needs Assessment  Outcome: Ongoing (interventions implemented as appropriate)    Goal: Interprofessional Rounds/Family Conf  Outcome: Ongoing (interventions implemented as appropriate)      Problem: Fall Risk (Adult)  Goal: Identify Related Risk Factors and Signs and Symptoms  Outcome: Outcome(s) achieved Date Met: 10/09/18    Goal: Absence of Fall  Outcome: Ongoing (interventions implemented as appropriate)      Problem: Nausea/Vomiting (Adult)  Goal: Symptom Relief  Outcome: Ongoing (interventions implemented as appropriate)    Goal: Adequate Hydration  Outcome: Ongoing (interventions implemented as appropriate)      Problem: Skin Injury Risk (Adult)  Goal: Identify Related Risk Factors and Signs and Symptoms  Outcome: Outcome(s) achieved Date Met: 10/09/18    Goal: Skin Health and Integrity  Outcome: Ongoing (interventions implemented as appropriate)

## 2018-10-09 NOTE — PROGRESS NOTES
"     LOS: 6 days   Primary Care Physician: Blanca Pitts MD     Subjective   \"I feel Pregnant with triplets\".  Belly is very uncomfortable and swollen.  Also has a headache, forehead and the top of the head.    Vital Signs  Body mass index is 30.75 kg/m².  Temp:  [98 °F (36.7 °C)-99 °F (37.2 °C)] 98.5 °F (36.9 °C)  Heart Rate:  [] 93  Resp:  [16-18] 18  BP: (121-139)/(57-93) 135/79      Objective:  General Appearance:  In no acute distress and uncomfortable (Looks older than age.).    Vital signs: (most recent): Blood pressure 135/79, pulse 93, temperature 98.5 °F (36.9 °C), temperature source Oral, resp. rate 18, height 168.9 cm (66.5\"), weight 87.7 kg (193 lb 6.4 oz), SpO2 95 %.    HEENT: (No JVD.  Neck supple.  No lymphadenopathy)    Lungs:  She is not in respiratory distress.  No stridor.  There are decreased breath sounds.  No rales, wheezes or rhonchi.    Heart: Normal rate.  Regular rhythm.  No murmur.   Abdomen: Abdomen is soft and distended.  There are signs of ascites. Bowel sounds are normal.   There is generalized tenderness.  (Especially tender left upper quadrant and lateral right upper quadrant).   There is splenomegaly. There is no hepatomegaly.   Extremities: There is no dependent edema.    Neurological: Patient is alert.    Skin:  Warm and dry.  No rash.       Results Review:    I reviewed the patient's new clinical results.      Results from last 7 days  Lab Units 10/09/18  0443 10/08/18  0504   WBC 10*3/mm3 1.66* 1.83*   HEMOGLOBIN g/dL 9.5* 9.6*   PLATELETS 10*3/mm3 79* 83*       Results from last 7 days  Lab Units 10/08/18  0504 10/05/18  0352   SODIUM mmol/L 142 138   POTASSIUM mmol/L 3.7 3.7   CHLORIDE mmol/L 108* 106   CO2 mmol/L 24.2 18.6*   BUN mg/dL 5* 7   CREATININE mg/dL 0.68 0.64   CALCIUM mg/dL 8.8 8.3*   GLUCOSE mg/dL 122* 141*       Results from last 7 days  Lab Units 10/02/18  2332   INR  1.24*     Hemoglobin A1C:  Lab Results   Component Value Date    HGBA1C 8.60 (H) " 10/03/2018       Glucose Range:  Glucose   Date/Time Value Ref Range Status   10/09/2018 1114 219 (H) 70 - 130 mg/dL Final   10/09/2018 0629 171 (H) 70 - 130 mg/dL Final   10/08/2018 2104 287 (H) 70 - 130 mg/dL Final   10/08/2018 1622 184 (H) 70 - 130 mg/dL Final   10/08/2018 1151 178 (H) 70 - 130 mg/dL Final   10/08/2018 0646 102 70 - 130 mg/dL Final       Lab Results   Component Value Date    CEVTIYZM50 767 09/10/2018       Lab Results   Component Value Date    TSH 1.740 10/06/2018       Assessment & Plan      Medication Review: Yes    Active Hospital Problems    Diagnosis Date Noted   • **UGI bleed [K92.2] 10/04/2018   • Intractable vomiting with nausea [R11.2] 10/03/2018   • Gastroparesis [K31.84] 10/17/2017   • Abdominal pain [R10.9] 02/14/2017   • Type 2 diabetes mellitus, uncontrolled, with neuropathy (CMS/HCC) [E11.40, E11.65] 03/15/2016   • Cirrhosis of liver (CMS/HCC) [K74.60] 03/08/2016   • Rheumatoid arthritis (CMS/HCC) [M06.9] 03/08/2016   • Anxiety and depression [F41.9, F32.9] 03/08/2016      Resolved Hospital Problems    Diagnosis Date Noted Date Resolved   No resolved problems to display.       Assessment/Plan  1.  Abdominal pain with increased girth.  Appears to be ascites.  I ordered paracentesis under CT; however, radiology changed it to paracentesis with ultrasound guidance.  We'll see how much fluid can be removed.  Her last abdominal scan from 4 days ago did not mention much in the way of fluid, but on exam she does appear to have at least some.  Does have a history of gastroparesis but unable to take Reglan.  2.  Pancytopenia.  Numbers fairly stable.  Secondary to hypersplenism and cirrhosis.  Also has history of rheumatoid arthritis.  Continue prednisone 10 mg daily for history of ITP.  B12 last month normal.  3.  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  Diabetes mellitus type 2.  Insulin adjusted by .  Continue sliding scale    Appreciate consultants.    Rama Morton MD  10/09/18  3:16  PM

## 2018-10-09 NOTE — PLAN OF CARE
Problem: Patient Care Overview  Goal: Plan of Care Review  Outcome: Ongoing (interventions implemented as appropriate)   10/08/18 1604 10/09/18 0055 10/09/18 0351   Plan of Care Review   Progress no change --  --    OTHER   Outcome Summary --  --  VSS, SR, RA, AxOx4, pt complaining of constant headache with senstivity to light and sound, says it is improving but still present. F/C to be d/c'd this am. Will continue to closely monitor.   Coping/Psychosocial   Plan of Care Reviewed With --  patient --      Goal: Individualization and Mutuality  Outcome: Ongoing (interventions implemented as appropriate)    Goal: Discharge Needs Assessment  Outcome: Ongoing (interventions implemented as appropriate)    Goal: Interprofessional Rounds/Family Conf  Outcome: Ongoing (interventions implemented as appropriate)      Problem: Fall Risk (Adult)  Goal: Identify Related Risk Factors and Signs and Symptoms  Outcome: Ongoing (interventions implemented as appropriate)    Goal: Absence of Fall  Outcome: Ongoing (interventions implemented as appropriate)      Problem: Nausea/Vomiting (Adult)  Goal: Symptom Relief  Outcome: Ongoing (interventions implemented as appropriate)    Goal: Adequate Hydration  Outcome: Ongoing (interventions implemented as appropriate)      Problem: Skin Injury Risk (Adult)  Goal: Identify Related Risk Factors and Signs and Symptoms  Outcome: Ongoing (interventions implemented as appropriate)    Goal: Skin Health and Integrity  Outcome: Ongoing (interventions implemented as appropriate)

## 2018-10-10 ENCOUNTER — APPOINTMENT (OUTPATIENT)
Dept: ULTRASOUND IMAGING | Facility: HOSPITAL | Age: 58
End: 2018-10-10

## 2018-10-10 PROBLEM — K74.60 CIRRHOSIS OF LIVER WITH ASCITES (HCC): Status: ACTIVE | Noted: 2017-11-17

## 2018-10-10 PROBLEM — R18.8 CIRRHOSIS OF LIVER WITH ASCITES (HCC): Status: ACTIVE | Noted: 2017-11-17

## 2018-10-10 LAB
ANION GAP SERPL CALCULATED.3IONS-SCNC: 12.1 MMOL/L
BASOPHILS # BLD AUTO: 0.01 10*3/MM3 (ref 0–0.2)
BASOPHILS NFR BLD AUTO: 0.6 % (ref 0–1.5)
BUN BLD-MCNC: 8 MG/DL (ref 6–20)
BUN/CREAT SERPL: 11 (ref 7–25)
CALCIUM SPEC-SCNC: 9.1 MG/DL (ref 8.6–10.5)
CHLORIDE SERPL-SCNC: 100 MMOL/L (ref 98–107)
CO2 SERPL-SCNC: 26.9 MMOL/L (ref 22–29)
CREAT BLD-MCNC: 0.73 MG/DL (ref 0.57–1)
DEPRECATED RDW RBC AUTO: 49.3 FL (ref 37–54)
EOSINOPHIL # BLD AUTO: 0.08 10*3/MM3 (ref 0–0.7)
EOSINOPHIL NFR BLD AUTO: 4.5 % (ref 0.3–6.2)
ERYTHROCYTE [DISTWIDTH] IN BLOOD BY AUTOMATED COUNT: 15.9 % (ref 11.7–13)
GFR SERPL CREATININE-BSD FRML MDRD: 82 ML/MIN/1.73
GLUCOSE BLD-MCNC: 195 MG/DL (ref 65–99)
GLUCOSE BLDC GLUCOMTR-MCNC: 175 MG/DL (ref 70–130)
GLUCOSE BLDC GLUCOMTR-MCNC: 201 MG/DL (ref 70–130)
GLUCOSE BLDC GLUCOMTR-MCNC: 201 MG/DL (ref 70–130)
GLUCOSE BLDC GLUCOMTR-MCNC: 356 MG/DL (ref 70–130)
HCT VFR BLD AUTO: 30.9 % (ref 35.6–45.5)
HGB BLD-MCNC: 9.9 G/DL (ref 11.9–15.5)
IMM GRANULOCYTES # BLD: 0.01 10*3/MM3 (ref 0–0.03)
IMM GRANULOCYTES NFR BLD: 0.6 % (ref 0–0.5)
INR PPP: 1.37 (ref 0.9–1.1)
LYMPHOCYTES # BLD AUTO: 0.52 10*3/MM3 (ref 0.9–4.8)
LYMPHOCYTES NFR BLD AUTO: 29.5 % (ref 19.6–45.3)
MCH RBC QN AUTO: 27.2 PG (ref 26.9–32)
MCHC RBC AUTO-ENTMCNC: 32 G/DL (ref 32.4–36.3)
MCV RBC AUTO: 84.9 FL (ref 80.5–98.2)
MONOCYTES # BLD AUTO: 0.26 10*3/MM3 (ref 0.2–1.2)
MONOCYTES NFR BLD AUTO: 14.8 % (ref 5–12)
NEUTROPHILS # BLD AUTO: 0.89 10*3/MM3 (ref 1.9–8.1)
NEUTROPHILS NFR BLD AUTO: 50.6 % (ref 42.7–76)
PLATELET # BLD AUTO: 77 10*3/MM3 (ref 140–500)
PMV BLD AUTO: 10.6 FL (ref 6–12)
POTASSIUM BLD-SCNC: 3.7 MMOL/L (ref 3.5–5.2)
PROTHROMBIN TIME: 16.6 SECONDS (ref 11.7–14.2)
RBC # BLD AUTO: 3.64 10*6/MM3 (ref 3.9–5.2)
SODIUM BLD-SCNC: 139 MMOL/L (ref 136–145)
WBC NRBC COR # BLD: 1.76 10*3/MM3 (ref 4.5–10.7)

## 2018-10-10 PROCEDURE — 63710000001 INSULIN ASPART PER 5 UNITS: Performed by: INTERNAL MEDICINE

## 2018-10-10 PROCEDURE — 25010000002 PROMETHAZINE PER 50 MG: Performed by: NURSE PRACTITIONER

## 2018-10-10 PROCEDURE — 0W9G3ZZ DRAINAGE OF PERITONEAL CAVITY, PERCUTANEOUS APPROACH: ICD-10-PCS | Performed by: RADIOLOGY

## 2018-10-10 PROCEDURE — 82962 GLUCOSE BLOOD TEST: CPT

## 2018-10-10 PROCEDURE — 85025 COMPLETE CBC W/AUTO DIFF WBC: CPT | Performed by: INTERNAL MEDICINE

## 2018-10-10 PROCEDURE — 63710000001 PREDNISONE PER 5 MG: Performed by: HOSPITALIST

## 2018-10-10 PROCEDURE — 99253 IP/OBS CNSLTJ NEW/EST LOW 45: CPT | Performed by: PSYCHIATRY & NEUROLOGY

## 2018-10-10 PROCEDURE — 99232 SBSQ HOSP IP/OBS MODERATE 35: CPT | Performed by: INTERNAL MEDICINE

## 2018-10-10 PROCEDURE — 85610 PROTHROMBIN TIME: CPT | Performed by: INTERNAL MEDICINE

## 2018-10-10 PROCEDURE — 80048 BASIC METABOLIC PNL TOTAL CA: CPT | Performed by: INTERNAL MEDICINE

## 2018-10-10 PROCEDURE — 25010000002 MAGNESIUM SULFATE IN D5W 1G/100ML (PREMIX) 1-5 GM/100ML-% SOLUTION: Performed by: INTERNAL MEDICINE

## 2018-10-10 PROCEDURE — 76942 ECHO GUIDE FOR BIOPSY: CPT

## 2018-10-10 PROCEDURE — 63710000001 INSULIN DETEMIR PER 5 UNITS: Performed by: INTERNAL MEDICINE

## 2018-10-10 RX ORDER — AMITRIPTYLINE HYDROCHLORIDE 50 MG/1
50 TABLET, FILM COATED ORAL NIGHTLY
Status: DISCONTINUED | OUTPATIENT
Start: 2018-10-10 | End: 2018-10-11 | Stop reason: HOSPADM

## 2018-10-10 RX ORDER — BACLOFEN 10 MG/1
20 TABLET ORAL NIGHTLY
Status: DISCONTINUED | OUTPATIENT
Start: 2018-10-10 | End: 2018-10-11

## 2018-10-10 RX ORDER — MAGNESIUM SULFATE 1 G/100ML
1 INJECTION INTRAVENOUS ONCE
Status: COMPLETED | OUTPATIENT
Start: 2018-10-10 | End: 2018-10-10

## 2018-10-10 RX ADMIN — OXYCODONE HYDROCHLORIDE 10 MG: 5 TABLET ORAL at 01:32

## 2018-10-10 RX ADMIN — Medication 3 ML: at 08:10

## 2018-10-10 RX ADMIN — INSULIN ASPART 2 UNITS: 100 INJECTION, SOLUTION INTRAVENOUS; SUBCUTANEOUS at 21:21

## 2018-10-10 RX ADMIN — DULOXETINE HYDROCHLORIDE 90 MG: 60 CAPSULE, DELAYED RELEASE ORAL at 08:07

## 2018-10-10 RX ADMIN — POLYETHYLENE GLYCOL 3350 17 G: 17 POWDER, FOR SOLUTION ORAL at 08:10

## 2018-10-10 RX ADMIN — SUCRALFATE 1 G: 1 TABLET ORAL at 08:07

## 2018-10-10 RX ADMIN — Medication 3 ML: at 21:24

## 2018-10-10 RX ADMIN — SUCRALFATE 1 G: 1 TABLET ORAL at 21:22

## 2018-10-10 RX ADMIN — INSULIN DETEMIR 40 UNITS: 100 INJECTION, SOLUTION SUBCUTANEOUS at 08:08

## 2018-10-10 RX ADMIN — BACLOFEN 20 MG: 10 TABLET ORAL at 21:22

## 2018-10-10 RX ADMIN — LEVETIRACETAM 500 MG: 500 TABLET, FILM COATED ORAL at 08:07

## 2018-10-10 RX ADMIN — PANTOPRAZOLE SODIUM 40 MG: 40 INJECTION, POWDER, FOR SOLUTION INTRAVENOUS at 06:05

## 2018-10-10 RX ADMIN — SPIRONOLACTONE 100 MG: 100 TABLET ORAL at 08:08

## 2018-10-10 RX ADMIN — PREGABALIN 75 MG: 75 CAPSULE ORAL at 21:28

## 2018-10-10 RX ADMIN — TRIMETHOBENZAMIDE HYDROCHLORIDE 300 MG: 300 CAPSULE ORAL at 08:07

## 2018-10-10 RX ADMIN — BUTALBITAL, ACETAMINOPHEN, AND CAFFEINE 1 TABLET: 50; 325; 40 TABLET ORAL at 02:22

## 2018-10-10 RX ADMIN — OXYCODONE HYDROCHLORIDE 10 MG: 5 TABLET ORAL at 18:38

## 2018-10-10 RX ADMIN — OXYCODONE HYDROCHLORIDE 10 MG: 5 TABLET ORAL at 05:42

## 2018-10-10 RX ADMIN — INSULIN ASPART 10 UNITS: 100 INJECTION, SOLUTION INTRAVENOUS; SUBCUTANEOUS at 17:30

## 2018-10-10 RX ADMIN — MAGNESIUM SULFATE HEPTAHYDRATE 1 G: 1 INJECTION, SOLUTION INTRAVENOUS at 14:28

## 2018-10-10 RX ADMIN — RIFAXIMIN 550 MG: 550 TABLET ORAL at 21:22

## 2018-10-10 RX ADMIN — INSULIN DETEMIR 20 UNITS: 100 INJECTION, SOLUTION SUBCUTANEOUS at 21:20

## 2018-10-10 RX ADMIN — OXYCODONE HYDROCHLORIDE 10 MG: 5 TABLET ORAL at 10:06

## 2018-10-10 RX ADMIN — OXYCODONE HYDROCHLORIDE 10 MG: 5 TABLET ORAL at 14:28

## 2018-10-10 RX ADMIN — BUPROPION HYDROCHLORIDE 150 MG: 150 TABLET, FILM COATED, EXTENDED RELEASE ORAL at 08:07

## 2018-10-10 RX ADMIN — FOLIC ACID 1000 MCG: 1 TABLET ORAL at 08:07

## 2018-10-10 RX ADMIN — AMITRIPTYLINE HYDROCHLORIDE 50 MG: 50 TABLET, FILM COATED ORAL at 21:22

## 2018-10-10 RX ADMIN — RIFAXIMIN 550 MG: 550 TABLET ORAL at 08:07

## 2018-10-10 RX ADMIN — LEVETIRACETAM 500 MG: 500 TABLET, FILM COATED ORAL at 21:21

## 2018-10-10 RX ADMIN — PREGABALIN 75 MG: 75 CAPSULE ORAL at 08:07

## 2018-10-10 RX ADMIN — HYDROXYZINE HYDROCHLORIDE 50 MG: 50 TABLET, FILM COATED ORAL at 08:08

## 2018-10-10 RX ADMIN — INSULIN ASPART 4 UNITS: 100 INJECTION, SOLUTION INTRAVENOUS; SUBCUTANEOUS at 08:10

## 2018-10-10 RX ADMIN — PROMETHAZINE HYDROCHLORIDE 12.5 MG: 25 INJECTION, SOLUTION INTRAMUSCULAR; INTRAVENOUS at 05:42

## 2018-10-10 RX ADMIN — TRIMETHOBENZAMIDE HYDROCHLORIDE 300 MG: 300 CAPSULE ORAL at 15:23

## 2018-10-10 RX ADMIN — BUTALBITAL, ACETAMINOPHEN, AND CAFFEINE 1 TABLET: 50; 325; 40 TABLET ORAL at 08:43

## 2018-10-10 RX ADMIN — OXYCODONE HYDROCHLORIDE 10 MG: 5 TABLET ORAL at 23:40

## 2018-10-10 RX ADMIN — PREDNISONE 10 MG: 10 TABLET ORAL at 08:07

## 2018-10-10 RX ADMIN — PROMETHAZINE HYDROCHLORIDE 12.5 MG: 25 INJECTION, SOLUTION INTRAMUSCULAR; INTRAVENOUS at 17:30

## 2018-10-10 RX ADMIN — INSULIN ASPART 4 UNITS: 100 INJECTION, SOLUTION INTRAVENOUS; SUBCUTANEOUS at 12:07

## 2018-10-10 RX ADMIN — INSULIN ASPART 10 UNITS: 100 INJECTION, SOLUTION INTRAVENOUS; SUBCUTANEOUS at 12:07

## 2018-10-10 RX ADMIN — INSULIN ASPART 12 UNITS: 100 INJECTION, SOLUTION INTRAVENOUS; SUBCUTANEOUS at 17:30

## 2018-10-10 RX ADMIN — INSULIN ASPART 10 UNITS: 100 INJECTION, SOLUTION INTRAVENOUS; SUBCUTANEOUS at 08:08

## 2018-10-10 RX ADMIN — HYDROXYZINE HYDROCHLORIDE 50 MG: 50 TABLET, FILM COATED ORAL at 15:23

## 2018-10-10 NOTE — PROGRESS NOTES
[facility] Progress Note    She is voiding with no effort    Bladder scans will be completely unreliable due to her ascites.    following

## 2018-10-10 NOTE — PROGRESS NOTES
"     LOS: 7 days   Primary Care Physician: Blanca Pitts MD     Subjective   Saw the patient earlier this morning.  She didn't feel good.  Abdominal distention not has severe as yesterday but still very uncomfortable.  Continues to have headache with light sensitivity.  In the past had used Imitrex but it made her chest tight    Vital Signs  Body mass index is 30.75 kg/m².  Temp:  [97.8 °F (36.6 °C)-98.3 °F (36.8 °C)] 98.3 °F (36.8 °C)  Heart Rate:  [] 105  Resp:  [16-18] 16  BP: (129-144)/(70-80) 144/80      Objective:  General Appearance:  In no acute distress and uncomfortable.    Vital signs: (most recent): Blood pressure 144/80, pulse 105, temperature 98.3 °F (36.8 °C), temperature source Oral, resp. rate 16, height 168.9 cm (66.5\"), weight 87.7 kg (193 lb 6.4 oz), SpO2 96 %.    HEENT: (No JVD.  Neck supple.  No lymphadenopathy.  Trachea midline; no thrush)    Lungs:  She is not in respiratory distress.  No stridor.  There are decreased breath sounds.  No rales, wheezes or rhonchi.    Heart: Normal rate.  Regular rhythm.  No murmur.   Abdomen: Abdomen is soft and distended.  Bowel sounds are normal.   There is right upper quadrant and left upper quadrant tenderness.  There is splenomegaly. There is no hepatomegaly.   Extremities: There is dependent edema.  (Trace)  Pulses: Distal pulses are intact.    Neurological: Patient is alert.    Skin:  Warm.          Results Review:    I reviewed the patient's new clinical results.      Results from last 7 days  Lab Units 10/10/18  0530 10/09/18  0443   WBC 10*3/mm3 1.76* 1.66*   HEMOGLOBIN g/dL 9.9* 9.5*   PLATELETS 10*3/mm3 77* 79*       Results from last 7 days  Lab Units 10/10/18  0530 10/08/18  0504   SODIUM mmol/L 139 142   POTASSIUM mmol/L 3.7 3.7   CHLORIDE mmol/L 100 108*   CO2 mmol/L 26.9 24.2   BUN mg/dL 8 5*   CREATININE mg/dL 0.73 0.68   CALCIUM mg/dL 9.1 8.8   GLUCOSE mg/dL 195* 122*       Results from last 7 days  Lab Units 10/10/18  0515   INR  " 1.37*     Hemoglobin A1C:  Lab Results   Component Value Date    HGBA1C 8.60 (H) 10/03/2018       Glucose Range:  Glucose   Date/Time Value Ref Range Status   10/10/2018 1112 201 (H) 70 - 130 mg/dL Final   10/10/2018 0637 201 (H) 70 - 130 mg/dL Final   10/09/2018 2119 337 (H) 70 - 130 mg/dL Final   10/09/2018 1646 162 (H) 70 - 130 mg/dL Final   10/09/2018 1114 219 (H) 70 - 130 mg/dL Final   10/09/2018 0629 171 (H) 70 - 130 mg/dL Final       Lab Results   Component Value Date    [id number] 767 09/10/2018       Lab Results   Component Value Date    TSH 1.740 10/06/2018       Assessment & Plan      Medication Review: Yes    Active Hospital Problems    Diagnosis Date Noted   • **Cirrhosis of liver with ascites (CMS/HCC) [K74.60, R18.8] 11/17/2017   • UGI bleed [K92.2] 10/04/2018   • Intractable vomiting with nausea [R11.2] 10/03/2018   • Gastroparesis [K31.84] 10/17/2017   • Abdominal pain [R10.9] 02/14/2017   • Type 2 diabetes mellitus, uncontrolled, with neuropathy (CMS/HCC) [E11.40, E11.65] 03/15/2016   • Cirrhosis of liver (CMS/HCC) [K74.60] 03/08/2016   • Rheumatoid arthritis (CMS/HCC) [M06.9] 03/08/2016   • Anxiety and depression [F41.9, F32.9] 03/08/2016      Resolved Hospital Problems    Diagnosis Date Noted Date Resolved   No resolved problems to display.       Assessment/Plan  1.  Increased abdominal girth with pain.  She went down this afternoon for paracentesis but insufficient fluid per the radiologist.  Will ask GI to see her again.  Consider repeat imaging.  2.  Migraine headache.  I ordered magnesium ×1 dose.  Neurology consulted.  3.  Diabetes mellitus type 2 with hyperglycemia.  I increased her Levemir dosage from 40 units in the morning to 44 and from 15 units at night to 16.  Continue sliding scale and mealtime insulin.  4.  Pancytopenia secondary to hypersplenism and cirrhosis.  Numbers fairly stable  5.  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  History of rheumatoid arthritis    Rama Morton  MD  10/10/18  2:22 PM

## 2018-10-10 NOTE — PLAN OF CARE
Problem: Patient Care Overview  Goal: Plan of Care Review  Outcome: Ongoing (interventions implemented as appropriate)   10/10/18 5140   Plan of Care Review   Progress no change   OTHER   Outcome Summary pt scheduled for paracentesis today but there was not enough fluid to tap- gi reconsulted for abd distention/ bloating, neurology consulted for migraines, prn pain meds given, voiding frequently, VSS, will continue to monitor closely    Coping/Psychosocial   Plan of Care Reviewed With patient     Goal: Individualization and Mutuality  Outcome: Ongoing (interventions implemented as appropriate)    Goal: Discharge Needs Assessment  Outcome: Ongoing (interventions implemented as appropriate)    Goal: Interprofessional Rounds/Family Conf  Outcome: Ongoing (interventions implemented as appropriate)      Problem: Fall Risk (Adult)  Goal: Absence of Fall  Outcome: Ongoing (interventions implemented as appropriate)      Problem: Nausea/Vomiting (Adult)  Goal: Symptom Relief  Outcome: Ongoing (interventions implemented as appropriate)    Goal: Adequate Hydration  Outcome: Ongoing (interventions implemented as appropriate)      Problem: Skin Injury Risk (Adult)  Goal: Skin Health and Integrity  Outcome: Ongoing (interventions implemented as appropriate)

## 2018-10-10 NOTE — PLAN OF CARE
Problem: Patient Care Overview  Goal: Plan of Care Review   10/09/18 1537 10/10/18 0431   Plan of Care Review   Progress no change --    OTHER   Outcome Summary --  vss this pm; pt stated that headache is somewhat relieved after treating with fioricet; voiding well after phipps cath removed; will continue to monitor    Coping/Psychosocial   Plan of Care Reviewed With --  patient     Goal: Individualization and Mutuality  Outcome: Ongoing (interventions implemented as appropriate)    Goal: Discharge Needs Assessment  Outcome: Ongoing (interventions implemented as appropriate)      Problem: Fall Risk (Adult)  Goal: Absence of Fall  Outcome: Ongoing (interventions implemented as appropriate)      Problem: Nausea/Vomiting (Adult)  Goal: Symptom Relief  Outcome: Ongoing (interventions implemented as appropriate)    Goal: Adequate Hydration  Outcome: Ongoing (interventions implemented as appropriate)      Problem: Skin Injury Risk (Adult)  Goal: Skin Health and Integrity  Outcome: Ongoing (interventions implemented as appropriate)

## 2018-10-10 NOTE — PROGRESS NOTES
[facility]     Inpatient Follow Up Note    Patient Identification:  Name: Silvia Zabala  Age: 58 y.o.  Sex: female  :  1960  MRN: [id number]    Information from:patient     CC: Bloating    History:   Lying quietly in bed with all lights turned off. Says she's nauseated. Some abdominal fullness.     Review of Systems:  Constitutional:  Negative   Cardiovascular:  Negative   Respiratory:  Negative             Problem List:  Patient Active Problem List    Diagnosis   • *Cirrhosis of liver with ascites (CMS/HCC) [K74.60, R18.8]   • UGI bleed [K92.2]   • Intractable vomiting with nausea [R11.2]   • Degeneration of lumbosacral intervertebral disc [M51.37]   • Seizure (CMS/HCC) [R56.9]   • Spondylosis without myelopathy [M47.819]   • Acute ITP (CMS/HCC) [D69.3]   • Fever [R50.9]   • Hepatic encephalopathy (CMS/HCC) [K72.90]   • Abnormal finding of blood chemistry  [R79.9]   • Diabetic peripheral neuropathy (CMS/HCC) [E11.42]   • History of seizure disorder [Z86.69]   • Diabetic ketoacidosis without coma associated with type 2 diabetes mellitus (CMS/HCC) [E11.10]   • Gastroparesis [K31.84]   • Portal hypertension (CMS/HCC) [K76.6]   • Systemic lupus (CMS/HCC) [M32.9]   • Secondary esophageal varices without bleeding (CMS/HCC) [I85.10]   • Ascites [R18.8]   • Hematemesis [K92.0]   • Abdominal pain [R10.9]   • Hyperlipidemia [E78.5]   • Nausea [R11.0]   • DUKES (nonalcoholic steatohepatitis) [K75.81]   • Pancytopenia (CMS/HCC) [D61.818]   • Hypersplenism [D73.1]   • Type 2 diabetes mellitus, uncontrolled, with neuropathy (CMS/HCC) [E11.40, E11.65]   • Fibrositis [M79.7]   • Change in blood platelet count [POA0374]   • Cirrhosis of liver (CMS/HCC) [K74.60]   • Rheumatoid arthritis (CMS/HCC) [M06.9]   • Anxiety and depression [F41.9, F32.9]     Current Meds:  MAR Reviewed  Scheduled Meds:  amitriptyline 50 mg Oral Nightly   baclofen 20 mg Oral Nightly   DULoxetine 90 mg Oral Daily  "  enoxaparin 40 mg Subcutaneous Q24H   folic acid 1,000 mcg Oral Daily   insulin aspart 0-10 Units Subcutaneous 4x Daily With Meals & Nightly   insulin aspart 12 Units Subcutaneous TID With Meals   insulin detemir 20 Units Subcutaneous Nightly   [START ON 10/11/2018] insulin detemir 50 Units Subcutaneous QAM   levETIRAcetam 500 mg Oral BID   pantoprazole 40 mg Intravenous Q AM   polyethylene glycol 17 g Oral Daily   predniSONE 10 mg Oral Daily   pregabalin 75 mg Oral BID   rifAXIMin 200 mg Oral Q8H   rifaximin 550 mg Oral BID   sodium chloride 3 mL Intravenous Q12H   spironolactone 100 mg Oral Daily   sucralfate 1 g Oral BID   trimethobenzamide 300 mg Oral TID     Continuous Infusions:   PRN Meds:.•  acetaminophen  •  ALPRAZolam  •  bisacodyl  •  butalbital-acetaminophen-caffeine  •  cyclobenzaprine  •  dextrose  •  dextrose  •  glucagon (human recombinant)  •  hydrOXYzine  •  nitroglycerin  •  ondansetron **OR** ondansetron ODT **OR** ondansetron  •  oxyCODONE  •  potassium chloride  •  promethazine  •  promethazine  •  sodium chloride  •  Insert peripheral IV **AND** sodium chloride  Allergies:  Allergies   Allergen Reactions   • Albuterol Anaphylaxis   • Tramadol Nausea Only, Other (See Comments) and GI Intolerance     Per pt causes her \"palsy, meaning shaking and tremors\"    • Lactulose Nausea And Vomiting and Other (See Comments)     Severe abdominal pain   • Quinine Derivatives Nausea And Vomiting       Intake/Output:     Intake/Output Summary (Last 24 hours) at 10/10/18 1808  Last data filed at 10/10/18 1712   Gross per 24 hour   Intake                0 ml   Output             2600 ml   Net            -2600 ml     New allergies/reactions:  None    Physical Exam:  Vitals:   Temp (24hrs), Av.1 °F (36.7 °C), Min:97.8 °F (36.6 °C), Max:98.3 °F (36.8 °C)    Temp:  [97.8 °F (36.6 °C)-98.3 °F (36.8 °C)] 98.3 °F (36.8 °C)  Heart Rate:  [] 105  Resp:  [16-18] 16  BP: (129-144)/(70-80) 144/80  /80 (BP " "Location: Right arm, Patient Position: Lying)   Pulse 105   Temp 98.3 °F (36.8 °C) (Oral)   Resp 16   Ht 168.9 cm (66.5\")   Wt 87.7 kg (193 lb 6.4 oz)   SpO2 96%   BMI 30.75 kg/m²     Exam:  NAD  PERRLA. Sclerae and conjunctivae normal  HENT: external inspection normal. Hearing intact.  Abdomen: benign examination.   Alert, oriented, normal affect.         DATA:  Radiology and Labs:   Recent Results (from the past 24 hour(s))   POC Glucose Once    Collection Time: 10/09/18  9:19 PM   Result Value Ref Range    Glucose 337 (H) 70 - 130 mg/dL   Protime-INR    Collection Time: 10/10/18  5:30 AM   Result Value Ref Range    Protime 16.6 (H) 11.7 - 14.2 Seconds    INR 1.37 (H) 0.90 - 1.10   Basic Metabolic Panel    Collection Time: 10/10/18  5:30 AM   Result Value Ref Range    Glucose 195 (H) 65 - 99 mg/dL    BUN 8 6 - 20 mg/dL    Creatinine 0.73 0.57 - 1.00 mg/dL    Sodium 139 136 - 145 mmol/L    Potassium 3.7 3.5 - 5.2 mmol/L    Chloride 100 98 - 107 mmol/L    CO2 26.9 22.0 - 29.0 mmol/L    Calcium 9.1 8.6 - 10.5 mg/dL    eGFR Non African Amer 82 >60 mL/min/1.73    BUN/Creatinine Ratio 11.0 7.0 - 25.0    Anion Gap 12.1 mmol/L   CBC Auto Differential    Collection Time: 10/10/18  5:30 AM   Result Value Ref Range    WBC 1.76 (C) 4.50 - 10.70 10*3/mm3    RBC 3.64 (L) 3.90 - 5.20 10*6/mm3    Hemoglobin 9.9 (L) 11.9 - 15.5 g/dL    Hematocrit 30.9 (L) 35.6 - 45.5 %    MCV 84.9 80.5 - 98.2 fL    MCH 27.2 26.9 - 32.0 pg    MCHC 32.0 (L) 32.4 - 36.3 g/dL    RDW 15.9 (H) 11.7 - 13.0 %    RDW-SD 49.3 37.0 - 54.0 fl    MPV 10.6 6.0 - 12.0 fL    Platelets 77 (L) 140 - 500 10*3/mm3    Neutrophil % 50.6 42.7 - 76.0 %    Lymphocyte % 29.5 19.6 - 45.3 %    Monocyte % 14.8 (H) 5.0 - 12.0 %    Eosinophil % 4.5 0.3 - 6.2 %    Basophil % 0.6 0.0 - 1.5 %    Immature Grans % 0.6 (H) 0.0 - 0.5 %    Neutrophils, Absolute 0.89 (C) 1.90 - 8.10 10*3/mm3    Lymphocytes, Absolute 0.52 (L) 0.90 - 4.80 10*3/mm3    Monocytes, Absolute 0.26 0.20 - " 1.20 10*3/mm3    Eosinophils, Absolute 0.08 0.00 - 0.70 10*3/mm3    Basophils, Absolute 0.01 0.00 - 0.20 10*3/mm3    Immature Grans, Absolute 0.01 0.00 - 0.03 10*3/mm3   POC Glucose Once    Collection Time: 10/10/18  6:37 AM   Result Value Ref Range    Glucose 201 (H) 70 - 130 mg/dL   POC Glucose Once    Collection Time: 10/10/18 11:12 AM   Result Value Ref Range    Glucose 201 (H) 70 - 130 mg/dL   POC Glucose Once    Collection Time: 10/10/18  4:03 PM   Result Value Ref Range    Glucose 356 (H) 70 - 130 mg/dL       Assessment:   Problem List:     Cirrhosis of liver with ascites (CMS/HCC)    Cirrhosis of liver (CMS/HCC)    Rheumatoid arthritis (CMS/HCC)    Anxiety and depression    Type 2 diabetes mellitus, uncontrolled, with neuropathy (CMS/HCC)    Abdominal pain    Gastroparesis    Intractable vomiting with nausea    UGI bleed    These symptoms are chronic in nature. I wonder whether she might have an element of SIBO to contribute to it. Otherwise I think this is closely related to her migraines.     Plan:   Trial of low dose Xifaxin.       Rich Coleman MD  [facility]/[place]  10/10/2018

## 2018-10-10 NOTE — PROGRESS NOTES
Discharge Planning Assessment  [facility]     Patient Name: Silvia Zabala  MRN: [id number]  Today's Date: 10/10/2018    Admit Date: 10/2/2018          Discharge Needs Assessment    No documentation.             Discharge Plan     Row Name 10/10/18 1517       Plan    Plan Home with spouse    Plan Comments Met with patient.  Plans remain home with spouse, denies needs....................Erin Pina RN        Destination     No service coordination in this encounter.      Durable Medical Equipment     No service coordination in this encounter.      Dialysis/Infusion     No service coordination in this encounter.      Home Medical Care     No service coordination in this encounter.      Social Care     No service coordination in this encounter.                Demographic Summary    No documentation.           Functional Status    No documentation.           Psychosocial    No documentation.           Abuse/Neglect    No documentation.           Legal    No documentation.           Substance Abuse    No documentation.           Patient Forms    No documentation.         Erin Pina RN

## 2018-10-10 NOTE — CONSULTS
Patient Identification:  NAME:  Silvia Zabala  Age:  58 y.o.   Sex:  female   :  1960   MRN:  [id number]       Chief complaint: Headache/ reason for consult headache    History of present illness:  This patient is a 58-year-old right-handed white female who states she's had fibromyalgia and migraines in her light she comes in now with a several day headache duration several days context patient with history of migraine duration is noted quality dull headache location whole head modifying factors none  She does have cirrhosis and severe ascites.    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   • Cirrhosis (CMS/HCC)    • Colon polyps    • Deafness    • Depression    • Diabetes mellitus (CMS/HCC)     Adult onset, insulin requiring   • Duodenal ulcer with hemorrhage    • Esophageal varices determined by endoscopy (CMS/HCC)    • Fibromyalgia    • Gallbladder disease    • Gastric varices    • Gastroparesis    • Glaucoma    • Granuloma annulare    • H/O B12 deficiency    • H/O Bleeding ulcer     And gastroesophageal varices   • H/O mixed connective tissue disorder    • Hemorrhoids    • Hiatal hernia    • History of transfusion    • Hx of being hospitalized     In [place] for GI bleeding from ulcer and varices   • Hyperlipidemia    • Migraine    • DUKES (nonalcoholic steatohepatitis) 2016   • BRICE (obstructive sleep apnea)    • Pancreas divisum    • Pancytopenia (CMS/HCC)     Chronic, due to cirrhosis and hypersplenism   • Peptic ulcer disease     And esophageal varices   • PONV (postoperative nausea and vomiting)    • RA (rheumatoid arthritis) (CMS/HCC)    • Seizure disorder (CMS/HCC)     LAST ONE SEVERAL YEARS AGO   • Seizures (CMS/HCC)    • Systemic lupus (CMS/HCC)        Past surgical history:  Past  "Surgical History:   Procedure Laterality Date   • BLADDER REPAIR  1991   • CATARACT EXTRACTION     • CHOLECYSTECTOMY  1994   • ENDOSCOPY N/A 11/7/2016    Procedure: ESOPHAGOGASTRODUODENOSCOPY WITH COLD POLYPECTOMY, BANDING,  AND CLIPS TIMES 2;  Surgeon: Rich Coleman MD;  Location: [facility] ENDOSCOPY;  Service:    • ENDOSCOPY N/A 12/22/2016    Procedure: ESOPHAGOGASTRODUODENOSCOPY WITH ESOPHAGEAL BANDING. 5 BANDS FIRED, 4 BANDS ADHERERD TO MUCOSA;  Surgeon: Rich Coleman MD;  Location: [facility] ENDOSCOPY;  Service:    • ENDOSCOPY N/A 2/15/2017    Procedure: ESOPHAGOGASTRODUODENOSCOPY AT BEDSIDE with esophageal varices banding (3);  Surgeon: Rich Coleman MD;  Location: [facility] ENDOSCOPY;  Service:    • ENDOSCOPY N/A 4/6/2017    Procedure: ESOPHAGOGASTRODUODENOSCOPY WITH ESOPHAGEAL VARICES BANDING x2;  Surgeon: Rich Coleman MD;  Location: [facility] ENDOSCOPY;  Service:    • ENDOSCOPY N/A 11/24/2017    Procedure: ESOPHAGOGASTRODUODENOSCOPY with biopsies;  Surgeon: Rich Coleman MD;  Location: [facility] ENDOSCOPY;  Service:    • ENDOSCOPY N/A 10/5/2018    Procedure: ESOPHAGOGASTRODUODENOSCOPY;  Surgeon: Rich Coleman MD;  Location: [facility] ENDOSCOPY;  Service: Gastroenterology   • ENDOSCOPY AND COLONOSCOPY  2014    Dr. Rich Coleman with findings of candida esophagitis   • EYE SURGERY     • HYSTERECTOMY  1986    partial   • JOINT REPLACEMENT     • KNEE SURGERY Right 1995    \"right knee recontstruction\"   • LIVER BIOPSY  01/2015   • MASS EXCISION     • STOMACH SURGERY         Allergies:  Albuterol; Tramadol; Lactulose; and Quinine derivatives    Home medications:  Prescriptions Prior to Admission   Medication Sig Dispense Refill Last Dose   • acetaminophen (TYLENOL) 325 MG tablet Take 2 tablets by mouth Every 4 (Four) Hours As Needed for Mild Pain .   Taking   • ALPRAZolam (XANAX) 0.5 MG tablet TAKE ONE TABLET BY MOUTH TWICE A DAY AS NEEDED FOR ANXIETY 60 tablet 1 Taking   • B-D UF III MINI PEN NEEDLES 31G X " 5 MM misc USE TO INJECT 5 TO 6 TIMES PER  each 2 Taking   • buPROPion XL (WELLBUTRIN XL) 150 MG 24 hr tablet TAKE ONE TABLET BY MOUTH DAILY 90 tablet 3 Taking   • cyclobenzaprine (FLEXERIL) 5 MG tablet Take 1 tablet by mouth 3 (Three) Times a Day As Needed for Muscle Spasms.   Taking   • DULoxetine (CYMBALTA) 30 MG capsule TAKE THREE CAPSULES BY MOUTH DAILY 90 capsule 3 Taking   • ferrous sulfate 325 (65 FE) MG tablet TAKE ONE TABLET BY MOUTH TWICE A DAY 60 tablet 3 Taking   • folic acid (FOLVITE) 1 MG tablet TAKE ONE TABLET BY MOUTH DAILY 30 tablet 3 Taking   • hydrOXYzine (ATARAX) 25 MG tablet Take 50 mg by mouth 3 (Three) Times a Day As Needed for Itching.   Patient Taking Differently   • Insulin Regular Human, Conc, (HUMULIN R U-500 KWIKPEN) 500 UNIT/ML solution pen-injector CONCENTRATED injection Inject 0.18 mL under the skin into the appropriate area as directed 4 (Four) Times a Day. (Patient taking differently: Inject 65 Units under the skin into the appropriate area as directed 4 (Four) Times a Day Before Meals & at Bedtime.) 16 pen 11 Taking   • Multiple Vitamin (MULTI-VITAMIN PO) Take 1 tablet by mouth Daily.   Taking   • OxyCODONE HCl (OXAYDO) 7.5 MG tablet  Take 7.5 mg by mouth Every 8 (Eight) Hours As Needed.   Taking   • pantoprazole (PROTONIX) 40 MG EC tablet TAKE ONE TABLET BY MOUTH DAILY 30 tablet 3 Taking   • predniSONE (DELTASONE) 10 MG tablet Take 1 tablet by mouth Daily. 90 tablet 3 Taking   • promethazine (PHENERGAN) 25 MG tablet TAKE ONE TABLET BY MOUTH EVERY 6 HOURS AS NEEDED FOR NAUSEA OR VOMITING 30 tablet 0 Taking   • RELISTOR 150 MG tablet 450 mg Daily.   Taking   • spironolactone (ALDACTONE) 100 MG tablet TAKE ONE TABLET BY MOUTH DAILY 30 tablet 3 Taking   • sucralfate (CARAFATE) 1 g tablet TAKE ONE TABLET BY MOUTH TWICE A DAY 60 tablet 1 Taking   • trimethobenzamide (TIGAN) 300 MG capsule Take 300 mg by mouth 3 (Three) Times a Day.   Taking   • XIFAXAN 550 MG tablet TAKE ONE  TABLET BY MOUTH TWICE A DAY 60 tablet 3 Taking   • cycloSPORINE (RESTASIS) 0.05 % ophthalmic emulsion 1 drop 2 (two) times a day.   Taking   • vitamin B-12 (CYANOCOBALAMIN) 1000 MCG tablet Take 1,000 mcg by mouth daily.   Taking        Hospital medications:    amitriptyline 50 mg Oral Nightly   baclofen 20 mg Oral Nightly   DULoxetine 90 mg Oral Daily   enoxaparin 40 mg Subcutaneous Q24H   folic acid 1,000 mcg Oral Daily   insulin aspart 0-10 Units Subcutaneous 4x Daily With Meals & Nightly   insulin aspart 10 Units Subcutaneous TID With Meals   insulin detemir 15 Units Subcutaneous Nightly   insulin detemir 40 Units Subcutaneous QAM   levETIRAcetam 500 mg Oral BID   pantoprazole 40 mg Intravenous Q AM   polyethylene glycol 17 g Oral Daily   predniSONE 10 mg Oral Daily   pregabalin 75 mg Oral BID   rifaximin 550 mg Oral BID   sodium chloride 3 mL Intravenous Q12H   spironolactone 100 mg Oral Daily   sucralfate 1 g Oral BID   trimethobenzamide 300 mg Oral TID        •  acetaminophen  •  ALPRAZolam  •  bisacodyl  •  butalbital-acetaminophen-caffeine  •  cyclobenzaprine  •  dextrose  •  dextrose  •  glucagon (human recombinant)  •  hydrOXYzine  •  nitroglycerin  •  ondansetron **OR** ondansetron ODT **OR** ondansetron  •  oxyCODONE  •  potassium chloride  •  promethazine  •  promethazine  •  sodium chloride  •  Insert peripheral IV **AND** sodium chloride    Family history:  Family History   Problem Relation Age of Onset   • Liver disease Other    • Depression Other    • Heart disease Other    • Hypertension Other    • Diabetes Other    • Breast cancer Other    • Brain cancer Other    • Uterine cancer Other    • Colon cancer Other    • Leukemia Other    • Colon cancer Maternal Aunt    • Hypertension Mother    • Diabetes type II Mother    • Rheum arthritis Mother    • Liver disease Mother         DUKES   • Heart disease Father    • Diabetes type II Father    • Diabetes type II Sister    • Lupus Sister    • Chrissie  Hyperthermia Neg Hx        Social history:  Social History   Substance Use Topics   • Smoking status: Former Smoker     Packs/day: 0.00     Years: 0.00     Quit date: 12/17/2015   • Smokeless tobacco: Never Used   • Alcohol use No       Review of systems:    Her head hurts her belly also hurts but no eyes ears nose throat skin bone joint  lymphatic hematologic or oncologic complaints no neck pain chest pain abdominal pain bowel bladder incontinence fever chills or rash she has had known pancytopenia due to hypersplenism and history of cirrhosis.  She is also had ITP in the past but she was not able to tell me all those    Objective:  Vitals Ranges:   Temp:  [97.8 °F (36.6 °C)-98.2 °F (36.8 °C)] 97.8 °F (36.6 °C)  Heart Rate:  [] 97  Resp:  [16-18] 16  BP: (129-137)/(70-80) 131/79      Physical Exam:  She is awake alert she's holding the top of her head and states that she's in pain she does appear to be in some pain fund of knowledge fair attention span and concentration good recent and remote memory good language function normal elderly appearing in no distress pupils 1-1/2 constricting to 1 normal disc retinas and visual fields extraocular movements are full without nystagmus nasolabial folds palate tongue symmetrical decreased hearing normal facial sensation head turning shoulder shrug motor 5 minus out of 5 times for no pronator drift atrophy fasciculations or resting tremor reflexes absent throughout toes downgoing bilaterally sensation normal light touch face both arms both legs coordination normal in the upper extremity station and gait was not tested she has ascites and is waiting for paracentesis extremities no clubbing cyanosis there is some peripheral edema visual acuity normal at 3 feet awake alert oriented ×3 neck is supple without bruits heart regular rate and rhythm without murmur    Results review:   I reviewed the patient's new clinical results.    Data review:  Lab Results (last 24 hours)      Procedure Component Value Units Date/Time    POC Glucose Once [[id number]]  (Abnormal) Collected:  10/10/18 1112    Specimen:  Blood Updated:  10/10/18 1123     Glucose 201 (H) mg/dL     Narrative:       Meter: [id number] : [id number] Manisha Kent TATYANA    POC Glucose Once [[id number]]  (Abnormal) Collected:  10/10/18 0637    Specimen:  Blood Updated:  10/10/18 0638     Glucose 201 (H) mg/dL     Narrative:       Meter: [id number] : [id number] Sonal Clinton RN    Basic Metabolic Panel [[id number]]  (Abnormal) Collected:  10/10/18 0530    Specimen:  Blood Updated:  10/10/18 0627     Glucose 195 (H) mg/dL      BUN 8 mg/dL      Creatinine 0.73 mg/dL      Sodium 139 mmol/L      Potassium 3.7 mmol/L      Chloride 100 mmol/L      CO2 26.9 mmol/L      Calcium 9.1 mg/dL      eGFR Non African Amer 82 mL/min/1.73      BUN/Creatinine Ratio 11.0     Anion Gap 12.1 mmol/L     Narrative:       GFR Normal >60  Chronic Kidney Disease <60  Kidney Failure <15    CBC & Differential [[id number]] Collected:  10/10/18 0530    Specimen:  Blood Updated:  10/10/18 0626    Narrative:       The following orders were created for panel order CBC & Differential.  Procedure                               Abnormality         Status                     ---------                               -----------         ------                     CBC Auto Differential[[id number]]        Abnormal            Final result                 Please view results for these tests on the individual orders.    CBC Auto Differential [[id number]]  (Abnormal) Collected:  10/10/18 0530    Specimen:  Blood Updated:  10/10/18 0626     WBC 1.76 (C) 10*3/mm3      RBC 3.64 (L) 10*6/mm3      Hemoglobin 9.9 (L) g/dL      Hematocrit 30.9 (L) %      MCV 84.9 fL      MCH 27.2 pg      MCHC 32.0 (L) g/dL      RDW 15.9 (H) %      RDW-SD 49.3 fl      MPV 10.6 fL      Platelets 77 (L) 10*3/mm3      Neutrophil % 50.6 %      Lymphocyte % 29.5 %      Monocyte % 14.8 (H) %      Eosinophil %  4.5 %      Basophil % 0.6 %      Immature Grans % 0.6 (H) %      Neutrophils, Absolute 0.89 (C) 10*3/mm3      Lymphocytes, Absolute 0.52 (L) 10*3/mm3      Monocytes, Absolute 0.26 10*3/mm3      Eosinophils, Absolute 0.08 10*3/mm3      Basophils, Absolute 0.01 10*3/mm3      Immature Grans, Absolute 0.01 10*3/mm3     Protime-INR [[id number]]  (Abnormal) Collected:  10/10/18 0530    Specimen:  Blood Updated:  10/10/18 0601     Protime 16.6 (H) Seconds      INR 1.37 (H)    POC Glucose Once [[id number]]  (Abnormal) Collected:  10/09/18 2119    Specimen:  Blood Updated:  10/09/18 2121     Glucose 337 (H) mg/dL     Narrative:       Meter: [id number] : [id number] Sonal Clinton RN    POC Glucose Once [[id number]]  (Abnormal) Collected:  10/09/18 1646    Specimen:  Blood Updated:  10/09/18 1650     Glucose 162 (H) mg/dL     Narrative:       Meter: [id number] : [id number] Miller Dobson PCA           Imaging:  Imaging Results (last 24 hours)     ** No results found for the last 24 hours. **             Assessment and Plan:       Cirrhosis of liver with ascites (CMS/HCC)    Cirrhosis of liver (CMS/HCC)    Rheumatoid arthritis (CMS/HCC)    Anxiety and depression    Type 2 diabetes mellitus, uncontrolled, with neuropathy (CMS/HCC)    Abdominal pain    Gastroparesis    Intractable vomiting with nausea    UGI bleed    This patient has an intractable common migraine she gives a past history consistent with common migraine she is on a large number of medications and I will discontinue the Wellbutrin for the time being as it is famous for actually causing headache.  I will give her Elavil in combination with baclofen tonight and reassess tomorrow.  This is not going to be a stroke TIA seizure or central nervous system infection.      Kervin Cardoza MD  10/10/18  2:01 PM

## 2018-10-10 NOTE — PROGRESS NOTES
58 y.o.   LOS: 7 days   Patient Care Team:  Blanca Pitts MD as PCP - General (Internal Medicine)  Blanca Pitts MD as PCP - Claims Attributed  Sukhdeep Mcdowell MD as Consulting Physician (Hematology and Oncology)  Blanca Pitts MD as Referring Physician (Internal Medicine)  Rich Coleman MD as Consulting Physician (Gastroenterology)  Merary Burnett MD as Consulting Physician (Endocrinology)    Chief Complaint:  Elevated bg    Chief Complaint   Patient presents with   • Vomiting   • Nausea   • Chest Pain   • Shortness of Breath       Subjective    Patient has been eating well. Started to notice some blood sugar elevations.   Couldn't get the paracentesis done today.     Interval History:    Review of Systems:   Review of Systems   Constitutional: Positive for appetite change and fatigue. Negative for fever.   Respiratory: Negative for shortness of breath.    Cardiovascular: Negative for chest pain.   Gastrointestinal: Positive for abdominal distention and abdominal pain. Negative for diarrhea and vomiting.   Endocrine: Negative for polydipsia and polyuria.   Genitourinary: Negative for flank pain.   Musculoskeletal: Positive for arthralgias and myalgias.   Skin: Positive for pallor.   Neurological: Positive for weakness. Negative for numbness.   Psychiatric/Behavioral: Negative for agitation.     Objective     Vital Signs   Temp:  [97.8 °F (36.6 °C)-98.3 °F (36.8 °C)] 98.3 °F (36.8 °C)  Heart Rate:  [] 105  Resp:  [16-18] 16  BP: (129-144)/(70-80) 144/80    Physical Exam:  Physical Exam   Constitutional: She is oriented to person, place, and time. She appears well-nourished.   HENT:   Head: Normocephalic and atraumatic.   Eyes: Conjunctivae and EOM are normal. No scleral icterus.   Neck: Normal range of motion. Neck supple. No thyromegaly present.   Cardiovascular: Normal rate and normal heart sounds.  Exam reveals no friction rub.    No murmur heard.  Pulmonary/Chest: Effort normal and breath sounds  normal. No stridor. She has no wheezes. She has no rales.   Abdominal: Soft. Bowel sounds are normal. She exhibits distension. There is tenderness.   Musculoskeletal: She exhibits no edema or tenderness.   Lymphadenopathy:     She has no cervical adenopathy.   Neurological: She is alert and oriented to person, place, and time.   Skin: Skin is warm and dry. She is not diaphoretic.   Psychiatric: She has a normal mood and affect.   Vitals reviewed.  Results Review:     I reviewed the patient's new clinical results and summarized them in subjective and in plan.      Glucose   Date/Time Value Ref Range Status   10/10/2018 0530 195 (H) 65 - 99 mg/dL Final   10/08/2018 0504 122 (H) 65 - 99 mg/dL Final     Lab Results (last 24 hours)     Procedure Component Value Units Date/Time    POC Glucose Once [[id number]]  (Abnormal) Collected:  10/10/18 1112    Specimen:  Blood Updated:  10/10/18 1123     Glucose 201 (H) mg/dL     Narrative:       Meter: [id number] : [id number] Manisha JOE    POC Glucose Once [[id number]]  (Abnormal) Collected:  10/10/18 0637    Specimen:  Blood Updated:  10/10/18 0638     Glucose 201 (H) mg/dL     Narrative:       Meter: [id number] : [id number] Sonal Clinton RN    Basic Metabolic Panel [[id number]]  (Abnormal) Collected:  10/10/18 0530    Specimen:  Blood Updated:  10/10/18 0627     Glucose 195 (H) mg/dL      BUN 8 mg/dL      Creatinine 0.73 mg/dL      Sodium 139 mmol/L      Potassium 3.7 mmol/L      Chloride 100 mmol/L      CO2 26.9 mmol/L      Calcium 9.1 mg/dL      eGFR Non African Amer 82 mL/min/1.73      BUN/Creatinine Ratio 11.0     Anion Gap 12.1 mmol/L     Narrative:       GFR Normal >60  Chronic Kidney Disease <60  Kidney Failure <15    CBC & Differential [[id number]] Collected:  10/10/18 0530    Specimen:  Blood Updated:  10/10/18 0626    Narrative:       The following orders were created for panel order CBC & Differential.  Procedure                               Abnormality          Status                     ---------                               -----------         ------                     CBC Auto Differential[[id number]]        Abnormal            Final result                 Please view results for these tests on the individual orders.    CBC Auto Differential [[id number]]  (Abnormal) Collected:  10/10/18 0530    Specimen:  Blood Updated:  10/10/18 0626     WBC 1.76 (C) 10*3/mm3      RBC 3.64 (L) 10*6/mm3      Hemoglobin 9.9 (L) g/dL      Hematocrit 30.9 (L) %      MCV 84.9 fL      MCH 27.2 pg      MCHC 32.0 (L) g/dL      RDW 15.9 (H) %      RDW-SD 49.3 fl      MPV 10.6 fL      Platelets 77 (L) 10*3/mm3      Neutrophil % 50.6 %      Lymphocyte % 29.5 %      Monocyte % 14.8 (H) %      Eosinophil % 4.5 %      Basophil % 0.6 %      Immature Grans % 0.6 (H) %      Neutrophils, Absolute 0.89 (C) 10*3/mm3      Lymphocytes, Absolute 0.52 (L) 10*3/mm3      Monocytes, Absolute 0.26 10*3/mm3      Eosinophils, Absolute 0.08 10*3/mm3      Basophils, Absolute 0.01 10*3/mm3      Immature Grans, Absolute 0.01 10*3/mm3     Protime-INR [[id number]]  (Abnormal) Collected:  10/10/18 0530    Specimen:  Blood Updated:  10/10/18 0601     Protime 16.6 (H) Seconds      INR 1.37 (H)    POC Glucose Once [[id number]]  (Abnormal) Collected:  10/09/18 2119    Specimen:  Blood Updated:  10/09/18 2121     Glucose 337 (H) mg/dL     Narrative:       Meter: [id number] : [id number] Sonal Clinton RN    POC Glucose Once [[id number]]  (Abnormal) Collected:  10/09/18 1646    Specimen:  Blood Updated:  10/09/18 1650     Glucose 162 (H) mg/dL     Narrative:       Meter: [id number] : [id number] Miller NOE        Imaging Results (last 24 hours)     Procedure Component Value Units Date/Time    US Paracentesis [[id number]] Updated:  10/10/18 1415    Specimen:  Body Fluid           Medication Review: done      Current Facility-Administered Medications:   •  acetaminophen (TYLENOL) tablet 650 mg, 650 mg, Oral, Q4H PRN,  Karlene Del Cid APRN  •  ALPRAZolam (XANAX) tablet 0.5 mg, 0.5 mg, Oral, BID PRN, Joes Almanza MD, 0.5 mg at 10/08/18 2055  •  amitriptyline (ELAVIL) tablet 50 mg, 50 mg, Oral, Nightly, Kervin Cardoza MD  •  baclofen (LIORESAL) tablet 20 mg, 20 mg, Oral, Nightly, Kervin Cardoza MD  •  bisacodyl (DULCOLAX) suppository 10 mg, 10 mg, Rectal, Daily PRN, Jose Almanza MD, 10 mg at 10/06/18 1717  •  butalbital-acetaminophen-caffeine (FIORICET, ESGIC) -40 MG per tablet 1 tablet, 1 tablet, Oral, Q6H PRN, Rama Morton MD, 1 tablet at 10/10/18 0843  •  cyclobenzaprine (FLEXERIL) tablet 5 mg, 5 mg, Oral, TID PRN, Jose Almanza MD, 5 mg at 10/04/18 1137  •  dextrose (D50W) 25 g/ 50mL Intravenous Solution 25 g, 25 g, Intravenous, Q15 Min PRN, Karlene Del Cid APRN  •  dextrose (GLUTOSE) oral gel 15 g, 15 g, Oral, Q15 Min PRN, Karlene Del Cid APRN  •  DULoxetine (CYMBALTA) DR capsule 90 mg, 90 mg, Oral, Daily, Jose Almanza MD, 90 mg at 10/10/18 0807  •  enoxaparin (LOVENOX) syringe 40 mg, 40 mg, Subcutaneous, Q24H, Raegan Reid MD, Stopped at 10/10/18 1230  •  folic acid (FOLVITE) tablet 1,000 mcg, 1,000 mcg, Oral, Daily, Jose Almanza MD, 1,000 mcg at 10/10/18 0807  •  glucagon (human recombinant) (GLUCAGEN DIAGNOSTIC) injection 1 mg, 1 mg, Subcutaneous, PRN, Karlene Del Cid APRN  •  hydrOXYzine (ATARAX) tablet 50 mg, 50 mg, Oral, TID PRN, Jose Almanza MD, 50 mg at 10/10/18 0808  •  insulin aspart (novoLOG) injection 0-10 Units, 0-10 Units, Subcutaneous, 4x Daily With Meals & Nightly, Merayr Burnett MD, 4 Units at 10/10/18 1207  •  insulin aspart (novoLOG) injection 12 Units, 12 Units, Subcutaneous, TID With Meals, Merary Burnett MD  •  insulin detemir (LEVEMIR) injection 20 Units, 20 Units, Subcutaneous, Nightly, Merary Burnett MD  •  [START ON 10/11/2018] insulin detemir (LEVEMIR) injection 50 Units, 50 Units, Subcutaneous, QA, Merary Burnett MD  •  levETIRAcetam  (KEPPRA) tablet 500 mg, 500 mg, Oral, BID, Jose Almanza MD, 500 mg at 10/10/18 0807  •  nitroglycerin (NITROSTAT) SL tablet 0.4 mg, 0.4 mg, Sublingual, Q5 Min PRN, Karlene Del Cid, APRN  •  ondansetron (ZOFRAN) tablet 4 mg, 4 mg, Oral, Q6H PRN **OR** ondansetron ODT (ZOFRAN-ODT) disintegrating tablet 4 mg, 4 mg, Oral, Q6H PRN **OR** ondansetron (ZOFRAN) injection 4 mg, 4 mg, Intravenous, Q6H PRN, Karlene Del Cid APRN, 4 mg at 10/05/18 1350  •  oxyCODONE (ROXICODONE) immediate release tablet 10 mg, 10 mg, Oral, Q4H PRN, Jose Almanza MD, 10 mg at 10/10/18 1428  •  pantoprazole (PROTONIX) injection 40 mg, 40 mg, Intravenous, Q AM, Jose Almanza MD, 40 mg at 10/10/18 0605  •  polyethylene glycol 3350 powder (packet), 17 g, Oral, Daily, Jose Almanza MD, 17 g at 10/10/18 0810  •  potassium chloride 10 mEq in 100 mL IVPB, 10 mEq, Intravenous, Q1H PRN, Jose Almanza MD  •  predniSONE (DELTASONE) tablet 10 mg, 10 mg, Oral, Daily, Jose Almanza MD, 10 mg at 10/10/18 0807  •  pregabalin (LYRICA) capsule 75 mg, 75 mg, Oral, BID, Jose Almanza MD, 75 mg at 10/10/18 0807  •  promethazine (PHENERGAN) injection 12.5 mg, 12.5 mg, Intravenous, Q6H PRN, Karlene Del Cid APRN, 12.5 mg at 10/10/18 0542  •  promethazine (PHENERGAN) tablet 25 mg, 25 mg, Oral, Q6H PRN, Jose Almanza MD, 25 mg at 10/09/18 1423  •  rifaximin (XIFAXAN) tablet 550 mg, 550 mg, Oral, BID, Jose Almanza MD, 550 mg at 10/10/18 0807  •  sodium chloride 0.9 % flush 1-10 mL, 1-10 mL, Intravenous, PRN, Karlene Del Cid, APRN  •  Insert peripheral IV, , , Once **AND** sodium chloride 0.9 % flush 10 mL, 10 mL, Intravenous, PRN, Reynaldo Albright PA  •  sodium chloride 0.9 % flush 3 mL, 3 mL, Intravenous, Q12H, Karlene Del Cid, JOEL, 3 mL at 10/10/18 0810  •  spironolactone (ALDACTONE) tablet 100 mg, 100 mg, Oral, Daily, Jose Almanza MD, 100 mg at 10/10/18 0808  •  sucralfate (CARAFATE) tablet 1 g, 1 g, Oral, BID, Archie,  "Jose COLÓN MD, 1 g at 10/10/18 0807  •  trimethobenzamide (TIGAN) capsule 300 mg, 300 mg, Oral, TID, Edling, Jose COLÓN MD, 300 mg at 10/10/18 0807    Assessment/Plan     Active Hospital Problems    Diagnosis Date Noted   • **Cirrhosis of liver with ascites (CMS/HCC) [K74.60, R18.8] 11/17/2017   • UGI bleed [K92.2] 10/04/2018   • Intractable vomiting with nausea [R11.2] 10/03/2018   • Gastroparesis [K31.84] 10/17/2017   • Abdominal pain [R10.9] 02/14/2017   • Type 2 diabetes mellitus, uncontrolled, with neuropathy (CMS/HCC) [E11.40, E11.65] 03/15/2016   • Cirrhosis of liver (CMS/HCC) [K74.60] 03/08/2016   • Rheumatoid arthritis (CMS/HCC) [M06.9] 03/08/2016   • Anxiety and depression [F41.9, F32.9] 03/08/2016      Resolved Hospital Problems    Diagnosis Date Noted Date Resolved   No resolved problems to display.     Type 2 dm - uncontrolled.   BG running high.   Increase to levemir 50 units in the morning  Increase levemir to 20 units at bedtime  Increase NovoLog to 12 units with each meal along with holding parameters  Continue NovoLog sliding scale 3 times a day before meals and at bedtime.     Currently holding patient's U-500 insulin.    She is on prednisone 10 mg oral daily.     TSH - 3.2 from July 2018.       Merary Burnett MD.  10/10/18  2:55 PM      EMR Dragon / transcription disclaimer:    \"Dictated utilizing Dragon dictation\".   "

## 2018-10-11 ENCOUNTER — TELEPHONE (OUTPATIENT)
Dept: GASTROENTEROLOGY | Facility: CLINIC | Age: 58
End: 2018-10-11

## 2018-10-11 VITALS
BODY MASS INDEX: 31.08 KG/M2 | DIASTOLIC BLOOD PRESSURE: 70 MMHG | TEMPERATURE: 98.6 F | SYSTOLIC BLOOD PRESSURE: 111 MMHG | RESPIRATION RATE: 18 BRPM | HEART RATE: 92 BPM | HEIGHT: 66 IN | OXYGEN SATURATION: 96 % | WEIGHT: 193.4 LBS

## 2018-10-11 PROBLEM — R33.9 URINARY RETENTION: Status: ACTIVE | Noted: 2018-10-11

## 2018-10-11 PROBLEM — G43.009 MIGRAINE WITHOUT AURA: Status: ACTIVE | Noted: 2018-10-11

## 2018-10-11 LAB
BASOPHILS # BLD AUTO: 0.01 10*3/MM3 (ref 0–0.2)
BASOPHILS NFR BLD AUTO: 0.5 % (ref 0–1.5)
DEPRECATED RDW RBC AUTO: 50.1 FL (ref 37–54)
EOSINOPHIL # BLD AUTO: 0.06 10*3/MM3 (ref 0–0.7)
EOSINOPHIL NFR BLD AUTO: 3.2 % (ref 0.3–6.2)
ERYTHROCYTE [DISTWIDTH] IN BLOOD BY AUTOMATED COUNT: 15.7 % (ref 11.7–13)
GLUCOSE BLDC GLUCOMTR-MCNC: 247 MG/DL (ref 70–130)
GLUCOSE BLDC GLUCOMTR-MCNC: 285 MG/DL (ref 70–130)
GLUCOSE BLDC GLUCOMTR-MCNC: 403 MG/DL (ref 70–130)
HCT VFR BLD AUTO: 32 % (ref 35.6–45.5)
HGB BLD-MCNC: 10.1 G/DL (ref 11.9–15.5)
IMM GRANULOCYTES # BLD: 0.02 10*3/MM3 (ref 0–0.03)
IMM GRANULOCYTES NFR BLD: 1.1 % (ref 0–0.5)
LYMPHOCYTES # BLD AUTO: 0.38 10*3/MM3 (ref 0.9–4.8)
LYMPHOCYTES NFR BLD AUTO: 20.2 % (ref 19.6–45.3)
MCH RBC QN AUTO: 27.2 PG (ref 26.9–32)
MCHC RBC AUTO-ENTMCNC: 31.6 G/DL (ref 32.4–36.3)
MCV RBC AUTO: 86.3 FL (ref 80.5–98.2)
MONOCYTES # BLD AUTO: 0.34 10*3/MM3 (ref 0.2–1.2)
MONOCYTES NFR BLD AUTO: 18.1 % (ref 5–12)
NEUTROPHILS # BLD AUTO: 1.07 10*3/MM3 (ref 1.9–8.1)
NEUTROPHILS NFR BLD AUTO: 56.9 % (ref 42.7–76)
PLATELET # BLD AUTO: 74 10*3/MM3 (ref 140–500)
PMV BLD AUTO: 10.2 FL (ref 6–12)
RBC # BLD AUTO: 3.71 10*6/MM3 (ref 3.9–5.2)
WBC NRBC COR # BLD: 1.88 10*3/MM3 (ref 4.5–10.7)

## 2018-10-11 PROCEDURE — 99231 SBSQ HOSP IP/OBS SF/LOW 25: CPT | Performed by: PSYCHIATRY & NEUROLOGY

## 2018-10-11 PROCEDURE — 63710000001 INSULIN DETEMIR PER 5 UNITS: Performed by: INTERNAL MEDICINE

## 2018-10-11 PROCEDURE — 63710000001 PREDNISONE PER 5 MG: Performed by: HOSPITALIST

## 2018-10-11 PROCEDURE — 63710000001 INSULIN ASPART PER 5 UNITS: Performed by: INTERNAL MEDICINE

## 2018-10-11 PROCEDURE — 85025 COMPLETE CBC W/AUTO DIFF WBC: CPT | Performed by: INTERNAL MEDICINE

## 2018-10-11 PROCEDURE — 25010000002 ENOXAPARIN PER 10 MG: Performed by: INTERNAL MEDICINE

## 2018-10-11 PROCEDURE — 82962 GLUCOSE BLOOD TEST: CPT

## 2018-10-11 RX ORDER — AMITRIPTYLINE HYDROCHLORIDE 50 MG/1
50 TABLET, FILM COATED ORAL NIGHTLY
Qty: 30 TABLET | Refills: 0 | Status: ON HOLD | OUTPATIENT
Start: 2018-10-11 | End: 2018-12-18 | Stop reason: SDUPTHER

## 2018-10-11 RX ORDER — AMITRIPTYLINE HYDROCHLORIDE 50 MG/1
50 TABLET, FILM COATED ORAL NIGHTLY
Qty: 30 TABLET | Refills: 0 | Status: SHIPPED | OUTPATIENT
Start: 2018-10-11 | End: 2018-10-11

## 2018-10-11 RX ADMIN — OXYCODONE HYDROCHLORIDE 10 MG: 5 TABLET ORAL at 08:55

## 2018-10-11 RX ADMIN — INSULIN DETEMIR 50 UNITS: 100 INJECTION, SOLUTION SUBCUTANEOUS at 08:43

## 2018-10-11 RX ADMIN — INSULIN ASPART 12 UNITS: 100 INJECTION, SOLUTION INTRAVENOUS; SUBCUTANEOUS at 08:43

## 2018-10-11 RX ADMIN — PREGABALIN 75 MG: 75 CAPSULE ORAL at 08:43

## 2018-10-11 RX ADMIN — PREDNISONE 10 MG: 10 TABLET ORAL at 08:44

## 2018-10-11 RX ADMIN — FOLIC ACID 1000 MCG: 1 TABLET ORAL at 08:44

## 2018-10-11 RX ADMIN — DULOXETINE HYDROCHLORIDE 90 MG: 60 CAPSULE, DELAYED RELEASE ORAL at 08:44

## 2018-10-11 RX ADMIN — INSULIN ASPART 4 UNITS: 100 INJECTION, SOLUTION INTRAVENOUS; SUBCUTANEOUS at 08:48

## 2018-10-11 RX ADMIN — SUCRALFATE 1 G: 1 TABLET ORAL at 08:44

## 2018-10-11 RX ADMIN — PANTOPRAZOLE SODIUM 40 MG: 40 INJECTION, POWDER, FOR SOLUTION INTRAVENOUS at 06:45

## 2018-10-11 RX ADMIN — SPIRONOLACTONE 100 MG: 100 TABLET ORAL at 08:44

## 2018-10-11 RX ADMIN — ENOXAPARIN SODIUM 40 MG: 40 INJECTION SUBCUTANEOUS at 11:53

## 2018-10-11 RX ADMIN — OXYCODONE HYDROCHLORIDE 10 MG: 5 TABLET ORAL at 13:07

## 2018-10-11 RX ADMIN — TRIMETHOBENZAMIDE HYDROCHLORIDE 300 MG: 300 CAPSULE ORAL at 08:44

## 2018-10-11 RX ADMIN — POLYETHYLENE GLYCOL 3350 17 G: 17 POWDER, FOR SOLUTION ORAL at 08:43

## 2018-10-11 RX ADMIN — LEVETIRACETAM 500 MG: 500 TABLET, FILM COATED ORAL at 08:44

## 2018-10-11 RX ADMIN — INSULIN ASPART 10 UNITS: 100 INJECTION, SOLUTION INTRAVENOUS; SUBCUTANEOUS at 11:54

## 2018-10-11 RX ADMIN — INSULIN ASPART 12 UNITS: 100 INJECTION, SOLUTION INTRAVENOUS; SUBCUTANEOUS at 11:53

## 2018-10-11 RX ADMIN — Medication 3 ML: at 08:48

## 2018-10-11 RX ADMIN — RIFAXIMIN 550 MG: 550 TABLET ORAL at 08:44

## 2018-10-11 NOTE — PLAN OF CARE
Problem: Patient Care Overview  Goal: Plan of Care Review  Outcome: Ongoing (interventions implemented as appropriate)   10/11/18 4807   Plan of Care Review   Progress improving   OTHER   Outcome Summary pt to be d/c to home today    Coping/Psychosocial   Plan of Care Reviewed With patient     Goal: Individualization and Mutuality  Outcome: Ongoing (interventions implemented as appropriate)    Goal: Discharge Needs Assessment  Outcome: Ongoing (interventions implemented as appropriate)    Goal: Interprofessional Rounds/Family Conf  Outcome: Ongoing (interventions implemented as appropriate)      Problem: Fall Risk (Adult)  Goal: Absence of Fall  Outcome: Ongoing (interventions implemented as appropriate)      Problem: Nausea/Vomiting (Adult)  Goal: Symptom Relief  Outcome: Ongoing (interventions implemented as appropriate)    Goal: Adequate Hydration  Outcome: Ongoing (interventions implemented as appropriate)      Problem: Skin Injury Risk (Adult)  Goal: Skin Health and Integrity  Outcome: Ongoing (interventions implemented as appropriate)

## 2018-10-11 NOTE — DISCHARGE SUMMARY
Date of Admission: 10/2/2018  Date of Discharge:  10/11/2018  Primary Care Physician: Blanca Pitts MD     Discharge Diagnosis:  Active Hospital Problems    Diagnosis Date Noted   • **Abdominal pain [R10.9] 02/14/2017   • Migraine without aura [G43.009] 10/11/2018   • Urinary retention [R33.9] 10/11/2018   • UGI bleed [K92.2] 10/04/2018   • Intractable vomiting with nausea [R11.2] 10/03/2018   • Cirrhosis of liver with ascites (CMS/HCC) [K74.60, R18.8] 11/17/2017   • Gastroparesis [K31.84] 10/17/2017   • Portal hypertension (CMS/HCC) [K76.6] 02/22/2017   • Pancytopenia (CMS/HCC) [D61.818] 04/12/2016   • Type 2 diabetes mellitus, uncontrolled, with neuropathy (CMS/HCC) [E11.40, E11.65] 03/15/2016   • Cirrhosis of liver (CMS/HCC) [K74.60] 03/08/2016   • Rheumatoid arthritis (CMS/HCC) [M06.9] 03/08/2016   • Anxiety and depression [F41.9, F32.9] 03/08/2016      Resolved Hospital Problems    Diagnosis Date Noted Date Resolved   No resolved problems to display.       Presenting Problem/History of Present Illness:  Dehydration [E86.0]  Hyperglycemia [R73.9]  DUKES (nonalcoholic steatohepatitis) [K75.81]  Dyspnea, unspecified type [R06.00]  Intractable vomiting with nausea, unspecified vomiting type [R11.2]     Hospital Course:  The patient is a 58 y.o. woman admitted for abdominal pain and increased girth.  She has known cirrhosis from DUKES.  Scans did not show significant fluid.  Paracentesis could not be done because of that.  GI attributed her pain to splenomegaly.  EGD was done because of nausea and vomiting.  She also developed migraine headaches and was seen by neurology.  She had good relief with one dose of baclofen and Elavil.  She was also given a dose of IV magnesium.  She continues on Aldactone.  Lasix was stopped this admission.  Dr. Cardoza recommended keeping her on Elavil at night.  He also stopped her Wellbutrin.     for management of her diabetes.  She was on very high doses of U500 insulin.  She  was managed with sliding scale and twice daily Levemir.  Dr. Burnett recommends resuming the U5 100 insulin with a high-dose sliding scale as noted in the discharge med rec.    Patient had brief episode of urinary retention which required a [place].  It was removed several days ago, and she is voiding fine on her own.      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  In addition she has a history of ITP.  Her numbers have been fairly stable.  She was followed by hematology during her stay.  She also has a history of rheumatoid arthritis.    Stable condition; fair prognosis    Exam Today: Feels better today.  Slept last night.  Headache is gone.  Lungs clear, breath sounds decreased but equal.  Heart regular without murmur.  Abdomen is distended.  She is tender at the left upper quadrant and right lateral mid quadrant (as before).  Trace edema.    Procedures Performed:  Procedure(s):  ESOPHAGOGASTRODUODENOSCOPY 10/5/18 which showed grade 1 varices.  No bleeding       Labs:  Lab Results   Component Value Date    WBC 1.88 (C) 10/11/2018    HGB 10.1 (L) 10/11/2018    HCT 32.0 (L) 10/11/2018    PLT 74 (L) 10/11/2018     Lab Results   Component Value Date     10/10/2018    K 3.7 10/10/2018     10/10/2018    CO2 26.9 10/10/2018    BUN 8 10/10/2018    CREATININE 0.73 10/10/2018    GLUCOSE 195 (H) 10/10/2018       Lab Results   Component Value Date    INR 1.37 (H) 10/10/2018     On 10/8/18:  ALT 17 AST 26 alkaline phosphatase 139 total bili 0.5 albumin 3.2 total protein 6       Consults:   Dr. Rich Cardoza     Discharge Disposition:  Home or Self Care    Discharge Medications:  Elavil 50 mg daily at bedtime as recommended by neurology     Discharge Medications      Changes to Medications      Instructions Start Date   Insulin Regular Human (Conc) 500 UNIT/ML solution pen-injector CONCENTRATED injection  Commonly known as:  HUMULIN R U-500  KWIKPEN  What changed:  · how much to take  · how to take this  · when to take this  · additional instructions   65 units with each meal, 30 units with snack and 3 units for every 50 above 150.         Continue These Medications      Instructions Start Date   ALPRAZolam 0.5 MG tablet  Commonly known as:  XANAX   TAKE ONE TABLET BY MOUTH TWICE A DAY AS NEEDED FOR ANXIETY      B-D UF III MINI PEN NEEDLES 31G X 5 MM misc  Generic drug:  Insulin Pen Needle   USE TO INJECT 5 TO 6 TIMES PER DAY      cyclobenzaprine 5 MG tablet  Commonly known as:  FLEXERIL   5 mg, Oral, 3 Times Daily PRN      cycloSPORINE 0.05 % ophthalmic emulsion  Commonly known as:  RESTASIS   1 drop, 2 Times Daily      DULoxetine 30 MG capsule  Commonly known as:  CYMBALTA   TAKE THREE CAPSULES BY MOUTH DAILY      folic acid 1 MG tablet  Commonly known as:  FOLVITE   TAKE ONE TABLET BY MOUTH DAILY      hydrOXYzine 25 MG tablet  Commonly known as:  ATARAX   50 mg, Oral, 3 Times Daily PRN      levETIRAcetam 500 MG tablet  Commonly known as:  KEPPRA   TAKE ONE TABLET BY MOUTH TWICE A DAY      LYRICA 75 MG capsule  Generic drug:  pregabalin   TAKE ONE CAPSULE BY MOUTH TWICE A DAY      MULTI-VITAMIN PO   1 tablet, Oral, Daily      OXAYDO 7.5 MG tablet   Generic drug:  OxyCODONE HCl   7.5 mg, Oral, Every 8 Hours PRN      pantoprazole 40 MG EC tablet  Commonly known as:  PROTONIX   TAKE ONE TABLET BY MOUTH DAILY      predniSONE 10 MG tablet  Commonly known as:  DELTASONE   10 mg, Oral, Daily      promethazine 25 MG tablet  Commonly known as:  PHENERGAN   TAKE ONE TABLET BY MOUTH EVERY 6 HOURS AS NEEDED FOR NAUSEA OR VOMITING      spironolactone 100 MG tablet  Commonly known as:  ALDACTONE   TAKE ONE TABLET BY MOUTH DAILY      sucralfate 1 g tablet  Commonly known as:  CARAFATE   TAKE ONE TABLET BY MOUTH TWICE A DAY      trimethobenzamide 300 MG capsule  Commonly known as:  TIGAN   300 mg, Oral, 3 Times Daily      vitamin B-12 1000 MCG tablet  Commonly known  as:  CYANOCOBALAMIN   1,000 mcg, Oral, Daily      XIFAXAN 550 MG tablet  Generic drug:  rifaximin   TAKE ONE TABLET BY MOUTH TWICE A DAY         Stop These Medications    acetaminophen 325 MG tablet  Commonly known as:  TYLENOL     buPROPion  MG 24 hr tablet  Commonly known as:  WELLBUTRIN XL     ferrous sulfate 325 (65 FE) MG tablet     furosemide 40 MG tablet  Commonly known as:  LASIX     RELISTOR 150 MG tablet  Generic drug:  Methylnaltrexone Bromide            Discharge Diet:   Diet Instructions     Diet: Specialty Diet; Thin Liquids, No Restrictions; Low Sodium       Discharge Diet:  Specialty Diet    Fluid Consistency:  Thin Liquids, No Restrictions    Specialty Diets:  Low Sodium          Activity at Discharge:   Activity Instructions     Activity as Tolerated       Other Instructions (Specify)       Take over the counter magnesium 500mg daily for one week. If migraines return, restart magnesium.          Follow-up Appointments:  Future Appointments  Date Time Provider Department Center   11/5/2018 11:00 AM LAB CHAIR 3 [facility]   11/5/2018 11:30 AM [facility]   11/30/2018 10:00 AM Sujata Salas MD [facility] None   12/3/2018 11:00 AM LAB CHAIR 6 [facility]   12/3/2018 11:30 AM RN [facility]   12/3/2018 2:15 PM Rich Coleman MD K [facility] None   12/11/2018 2:20 PM Merary Burnett MD K END [facility] None   1/7/2019 9:30 AM LAB CHAIR 3 [facility]   1/7/2019 10:00 AM Sukhdeep Mcdowell MD [facility]     Follow-up Information     Blanca Pitts MD Follow up in 1 week(s).    Specialty:  Internal Medicine  Why:  cirrhosis; migraines  Contact information:  [address] [facility] [address]  [place] [id number] [phone]             Merary Burnett MD Follow up in 1 week(s).    Specialty:  Endocrinology  Contact information:  [address]  [facility] [address] [phone]                           Test Results Pending at Discharge: None       Rama Morton MD  10/11/18  2:34 PM    Time Spent on Discharge Activities: 40 minutes.  Discussed with patient, nurse and CCP.  Discussed with .

## 2018-10-11 NOTE — PLAN OF CARE
Problem: Patient Care Overview  Goal: Plan of Care Review  Outcome: Ongoing (interventions implemented as appropriate)   10/11/18 0301   Plan of Care Review   Progress improving   OTHER   Outcome Summary reports improvement in headache and pain overall; vss; on room air; will continue to monitor   Coping/Psychosocial   Plan of Care Reviewed With patient     Goal: Individualization and Mutuality  Outcome: Ongoing (interventions implemented as appropriate)    Goal: Discharge Needs Assessment  Outcome: Ongoing (interventions implemented as appropriate)      Problem: Fall Risk (Adult)  Goal: Absence of Fall  Outcome: Ongoing (interventions implemented as appropriate)      Problem: Nausea/Vomiting (Adult)  Goal: Symptom Relief  Outcome: Ongoing (interventions implemented as appropriate)    Goal: Adequate Hydration  Outcome: Ongoing (interventions implemented as appropriate)      Problem: Skin Injury Risk (Adult)  Goal: Skin Health and Integrity  Outcome: Ongoing (interventions implemented as appropriate)

## 2018-10-11 NOTE — TELEPHONE ENCOUNTER
"Pt called, in hospital, and she is having some memory issues and would like her sister to call office to ask some questions.      Emma Haywood, pt's sister, called.  She is concerned that pt is having memory issues.  She also wants to know if there is anything that needs to be done about enlarged spleen, and when should pt has FU with Dr Coleman?    Sister's phone # [phone].    Dr Coleman, please advise.    10/15/18, pt called she is scheduled to go to [place] (was recently d/c from [facility]) on Sunday and wants to know if she needs any labs to check platelets and ammonia level and if Dr Coleman thinks it is okay to go out of town?    All d/w Dr Coleman, pt just needs routine FU appt (already has appt 12/3/18) and ideally sister should be present at appt to discuss issues. No labs needed right now.  She has had enlarged spleen for a while.  Pt needs to get \"okay\" from Endocrinologist, due to elevated glucose, regarding her visit out of town.  If okay with Endo, then ok with Dr Coleman to travel.    Pt verbalized understanding of above.  "

## 2018-10-11 NOTE — PROGRESS NOTES
Patient Identification:  NAME:  Silvia Zabala  Age:  58 y.o.   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   • Cirrhosis (CMS/HCC)    • Colon polyps    • Deafness    • Depression    • Diabetes mellitus (CMS/HCC)     Adult onset, insulin requiring   • Duodenal ulcer with hemorrhage    • Esophageal varices determined by endoscopy (CMS/HCC)    • Fibromyalgia    • Gallbladder disease    • Gastric varices    • Gastroparesis    • Glaucoma    • Granuloma annulare    • H/O B12 deficiency    • H/O Bleeding ulcer     And gastroesophageal varices   • H/O mixed connective tissue disorder    • Hemorrhoids    • Hiatal hernia    • History of transfusion    • Hx of being hospitalized     In [place] for GI bleeding from ulcer and varices   • Hyperlipidemia    • Migraine    • DUKES (nonalcoholic steatohepatitis) 2016   • BRICE (obstructive sleep apnea)    • Pancreas divisum    • Pancytopenia (CMS/HCC)     Chronic, due to cirrhosis and hypersplenism   • Peptic ulcer disease     And esophageal varices   • PONV (postoperative nausea and vomiting)    • RA (rheumatoid arthritis) (CMS/HCC)    • Seizure disorder (CMS/HCC)     LAST ONE SEVERAL YEARS AGO   • Seizures (CMS/HCC)    • Systemic lupus (CMS/HCC)        Allergies:  Albuterol; Tramadol; Lactulose; and Quinine derivatives    Home medications:  Prescriptions Prior to Admission   Medication Sig Dispense Refill Last Dose   • acetaminophen (TYLENOL) 325 MG tablet Take 2 tablets by mouth Every 4 (Four) Hours As Needed for Mild Pain .   Taking   • ALPRAZolam (XANAX) 0.5 MG tablet TAKE ONE  TABLET BY MOUTH TWICE A DAY AS NEEDED FOR ANXIETY 60 tablet 1 Taking   • B-D UF III MINI PEN NEEDLES 31G X 5 MM misc USE TO INJECT 5 TO 6 TIMES PER  each 2 Taking   • buPROPion XL (WELLBUTRIN XL) 150 MG 24 hr tablet TAKE ONE TABLET BY MOUTH DAILY 90 tablet 3 Taking   • cyclobenzaprine (FLEXERIL) 5 MG tablet Take 1 tablet by mouth 3 (Three) Times a Day As Needed for Muscle Spasms.   Taking   • DULoxetine (CYMBALTA) 30 MG capsule TAKE THREE CAPSULES BY MOUTH DAILY 90 capsule 3 Taking   • ferrous sulfate 325 (65 FE) MG tablet TAKE ONE TABLET BY MOUTH TWICE A DAY 60 tablet 3 Taking   • folic acid (FOLVITE) 1 MG tablet TAKE ONE TABLET BY MOUTH DAILY 30 tablet 3 Taking   • hydrOXYzine (ATARAX) 25 MG tablet Take 50 mg by mouth 3 (Three) Times a Day As Needed for Itching.   Patient Taking Differently   • Multiple Vitamin (MULTI-VITAMIN PO) Take 1 tablet by mouth Daily.   Taking   • OxyCODONE HCl (OXAYDO) 7.5 MG tablet  Take 7.5 mg by mouth Every 8 (Eight) Hours As Needed.   Taking   • pantoprazole (PROTONIX) 40 MG EC tablet TAKE ONE TABLET BY MOUTH DAILY 30 tablet 3 Taking   • predniSONE (DELTASONE) 10 MG tablet Take 1 tablet by mouth Daily. 90 tablet 3 Taking   • promethazine (PHENERGAN) 25 MG tablet TAKE ONE TABLET BY MOUTH EVERY 6 HOURS AS NEEDED FOR NAUSEA OR VOMITING 30 tablet 0 Taking   • RELISTOR 150 MG tablet 450 mg Daily.   Taking   • spironolactone (ALDACTONE) 100 MG tablet TAKE ONE TABLET BY MOUTH DAILY 30 tablet 3 Taking   • sucralfate (CARAFATE) 1 g tablet TAKE ONE TABLET BY MOUTH TWICE A DAY 60 tablet 1 Taking   • trimethobenzamide (TIGAN) 300 MG capsule Take 300 mg by mouth 3 (Three) Times a Day.   Taking   • XIFAXAN 550 MG tablet TAKE ONE TABLET BY MOUTH TWICE A DAY 60 tablet 3 Taking   • cycloSPORINE (RESTASIS) 0.05 % ophthalmic emulsion 1 drop 2 (two) times a day.   Taking   • vitamin B-12 (CYANOCOBALAMIN) 1000 MCG tablet Take 1,000 mcg by mouth daily.   Taking        Hospital  medications:    amitriptyline 50 mg Oral Nightly   baclofen 20 mg Oral Nightly   DULoxetine 90 mg Oral Daily   enoxaparin 40 mg Subcutaneous Q24H   folic acid 1,000 mcg Oral Daily   insulin aspart 0-10 Units Subcutaneous 4x Daily With Meals & Nightly   insulin aspart 12 Units Subcutaneous TID With Meals   insulin detemir 20 Units Subcutaneous Nightly   insulin detemir 50 Units Subcutaneous QAM   levETIRAcetam 500 mg Oral BID   pantoprazole 40 mg Intravenous Q AM   polyethylene glycol 17 g Oral Daily   predniSONE 10 mg Oral Daily   pregabalin 75 mg Oral BID   rifaximin 550 mg Oral BID   sodium chloride 3 mL Intravenous Q12H   spironolactone 100 mg Oral Daily   sucralfate 1 g Oral BID   trimethobenzamide 300 mg Oral TID        •  acetaminophen  •  ALPRAZolam  •  bisacodyl  •  butalbital-acetaminophen-caffeine  •  cyclobenzaprine  •  dextrose  •  dextrose  •  glucagon (human recombinant)  •  hydrOXYzine  •  nitroglycerin  •  ondansetron **OR** ondansetron ODT **OR** ondansetron  •  oxyCODONE  •  potassium chloride  •  promethazine  •  promethazine  •  sodium chloride  •  Insert peripheral IV **AND** sodium chloride      Objective:  Vitals Ranges:   Temp:  [98.1 °F (36.7 °C)-98.9 °F (37.2 °C)] 98.6 °F (37 °C)  Heart Rate:  [92-97] 92  Resp:  [16-18] 18  BP: (111-123)/(64-77) 111/70      Physical Exam:  Awake alert walking around the room and says her head feels great normal cranial nerves II through VII tongue is midline areflexic throughout toes downgoing bilaterally    Results review:   I reviewed the patient's new clinical results.    Data review:  Lab Results (last 24 hours)     Procedure Component Value Units Date/Time    POC Glucose Once [[id number]]  (Abnormal) Collected:  10/11/18 1123    Specimen:  Blood Updated:  10/11/18 1126     Glucose 403 (H) mg/dL     Narrative:       RN Notified R and V Meter: [id number] : [id number] Lewis Latrolana NA    POC Glucose Once [[id number]]  (Abnormal) Collected:  10/11/18  0652    Specimen:  Blood Updated:  10/11/18 0653     Glucose 247 (H) mg/dL     Narrative:       Meter: [id number] : [id number] Vivian JOE    CBC & Differential [[id number]] Collected:  10/11/18 0404    Specimen:  Blood Updated:  10/11/18 0451    Narrative:       The following orders were created for panel order CBC & Differential.  Procedure                               Abnormality         Status                     ---------                               -----------         ------                     CBC Auto Differential[[id number]]        Abnormal            Final result                 Please view results for these tests on the individual orders.    CBC Auto Differential [[id number]]  (Abnormal) Collected:  10/11/18 0404    Specimen:  Blood Updated:  10/11/18 0451     WBC 1.88 (C) 10*3/mm3      RBC 3.71 (L) 10*6/mm3      Hemoglobin 10.1 (L) g/dL      Hematocrit 32.0 (L) %      MCV 86.3 fL      MCH 27.2 pg      MCHC 31.6 (L) g/dL      RDW 15.7 (H) %      RDW-SD 50.1 fl      MPV 10.2 fL      Platelets 74 (L) 10*3/mm3      Neutrophil % 56.9 %      Lymphocyte % 20.2 %      Monocyte % 18.1 (H) %      Eosinophil % 3.2 %      Basophil % 0.5 %      Immature Grans % 1.1 (H) %      Neutrophils, Absolute 1.07 (L) 10*3/mm3      Lymphocytes, Absolute 0.38 (L) 10*3/mm3      Monocytes, Absolute 0.34 10*3/mm3      Eosinophils, Absolute 0.06 10*3/mm3      Basophils, Absolute 0.01 10*3/mm3      Immature Grans, Absolute 0.02 10*3/mm3     POC Glucose Once [[id number]]  (Abnormal) Collected:  10/10/18 2051    Specimen:  Blood Updated:  10/10/18 2052     Glucose 175 (H) mg/dL     Narrative:       Meter: [id number] : [id number] Navjot JOE    POC Glucose Once [[id number]]  (Abnormal) Collected:  10/10/18 1603    Specimen:  Blood Updated:  10/10/18 1613     Glucose 356 (H) mg/dL     Narrative:       Meter: [id number] : [id number] Manisha JOE           Imaging:  Imaging Results (last 24 hours)     Procedure  Component Value Units Date/Time    US Paracentesis [[id number]] Collected:  10/10/18 1729    Specimen:  Body Fluid Updated:  10/10/18 1729    Narrative:       LIMITED ULTRASOUND OF THE ABDOMEN FOR EVALUATION OF POSSIBLE ASCITES AND  POSSIBLE PARACENTESIS 10/10/2018      HISTORY: Possible ascites.     All 4 quadrants of the abdomen were imaged. No significant ascites is  seen. Given the absence of significant ascites, no paracentesis was  performed today.                Assessment and Plan:       Abdominal pain    Cirrhosis of liver (CMS/HCC)    Rheumatoid arthritis (CMS/HCC)    Anxiety and depression    Type 2 diabetes mellitus, uncontrolled, with neuropathy (CMS/HCC)    Pancytopenia (CMS/HCC)    Portal hypertension (CMS/HCC)    Gastroparesis    Cirrhosis of liver with ascites (CMS/HCC)    Intractable vomiting with nausea    UGI bleed    Migraine without aura    Urinary retention    Her headache has resolved I will continue current medications and at this point I will sign off      Kervin Cardoza MD  10/11/18  2:59 PM

## 2018-10-11 NOTE — PROGRESS NOTES
[facility] Progress Note    She is still voiding- apparently no ascites noted  Feeling bloated.  We will check PVR

## 2018-10-11 NOTE — PROGRESS NOTES
Continued Stay Note  [facility]     Patient Name: Silvia Zabala  MRN: [id number]  Today's Date: 10/11/2018    Admit Date: 10/2/2018          Discharge Plan     Row Name 10/11/18 1209       Plan    Plan Home with spouse assist    Plan Comments Dc orders noted.  Met with patient and she denies DC needs.  Plans remain home with spouse assist and transport...................Erin Pina RN              Discharge Codes    No documentation.       Expected Discharge Date and Time     Expected Discharge Date Expected Discharge Time    Oct 11, 2018             Erin Pina RN

## 2018-10-11 NOTE — PROGRESS NOTES
Discharge instructions from endocrine     Patient will resume home insulin regimen of U-500 insulin  Communicated about this information with Dr. Morton.  Patient will follow-up with me in clinic in about 1-2 weeks.

## 2018-10-11 NOTE — PROGRESS NOTES
[facility] Progress Note    She is still voiding well.  Her residual assay was 115 cc.  Her abdomen is soft.  We'll see her as needed.  If she still has some obstructive urinary symptoms we'll be happy to see her in the office as an outpatient.

## 2018-10-12 ENCOUNTER — READMISSION MANAGEMENT (OUTPATIENT)
Dept: CALL CENTER | Facility: HOSPITAL | Age: 58
End: 2018-10-12

## 2018-10-12 NOTE — PAYOR COMM NOTE
"Silvia Luther (58 y.o. Female)                    ATTENTION;   DC SUMMARY FOR YOUR REVIEW, CASE REF # [id number]                REPLY TO UR DEPT  [phone]       Date of Birth Social Security Number Address Home Phone MRN    1960  [address]    [place] [id number] [phone] [id number]    [employer] Marital Status          None        Admission Date Admission Type Admitting Provider Attending Provider Department, Room/Bed    10/2/18 Emergency Jose Almanza MD  [address], S[address]/1    Discharge Date Discharge Disposition Discharge Destination        10/11/2018 Home or Self Care              Attending Provider:  (none)   Allergies:  Albuterol, Tramadol, Lactulose, Quinine Derivatives    Isolation:  None   Infection:  None   Code Status:  Prior    Ht:  168.9 cm (66.5\")   Wt:  87.7 kg (193 lb 6.4 oz)    Admission Cmt:  None   Principal Problem:  Abdominal pain [R10.9]                 Active Insurance as of 10/2/2018     Primary Coverage     Payor Plan Insurance Group Employer/Plan Group    [employer] [facility] [employer] [facility] [id number]     Payor Plan Address Payor Plan Phone Number Effective From Effective To    [address] [phone] 1/1/2015     [facility] [id number]       Subscriber Name Subscriber Birth Date Member ID       JOSE LUTHER [birth year]/1970 [id number]           Secondary Coverage     Payor Plan Insurance Group Employer/Plan Group    MEDICARE MEDICARE A & B      Payor Plan Address Payor Plan Phone Number Effective From Effective To    [address] [phone] 9/1/2003     [facility] [id number]       Subscriber Name Subscriber Birth Date Member ID       SILVIA LUTHER 1960 [id number]                 Emergency Contacts      (Rel.) Home Phone Work Phone Mobile Phone    Jose Luther (Spouse) [phone] -- [phone]    VjKary suero (Mother In Law) (Relative) [phone] -- [phone]    " Mack Lora (Father) [phone] -- [phone]               Discharge Summary      Rama Morton MD at 10/11/2018  2:31 PM          Date of Admission: 10/2/2018  Date of Discharge:  10/11/2018  Primary Care Physician: Blanca Pitts MD     Discharge Diagnosis:  Active Hospital Problems    Diagnosis Date Noted   • **Abdominal pain [R10.9] 02/14/2017   • Migraine without aura [G43.009] 10/11/2018   • Urinary retention [R33.9] 10/11/2018   • UGI bleed [K92.2] 10/04/2018   • Intractable vomiting with nausea [R11.2] 10/03/2018   • Cirrhosis of liver with ascites (CMS/HCC) [K74.60, R18.8] 11/17/2017   • Gastroparesis [K31.84] 10/17/2017   • Portal hypertension (CMS/HCC) [K76.6] 02/22/2017   • Pancytopenia (CMS/HCC) [D61.818] 04/12/2016   • Type 2 diabetes mellitus, uncontrolled, with neuropathy (CMS/HCC) [E11.40, E11.65] 03/15/2016   • Cirrhosis of liver (CMS/HCC) [K74.60] 03/08/2016   • Rheumatoid arthritis (CMS/HCC) [M06.9] 03/08/2016   • Anxiety and depression [F41.9, F32.9] 03/08/2016      Resolved Hospital Problems    Diagnosis Date Noted Date Resolved   No resolved problems to display.       Presenting Problem/History of Present Illness:  Dehydration [E86.0]  Hyperglycemia [R73.9]  DUKES (nonalcoholic steatohepatitis) [K75.81]  Dyspnea, unspecified type [R06.00]  Intractable vomiting with nausea, unspecified vomiting type [R11.2]     Hospital Course:  The patient is a 58 y.o. woman admitted for abdominal pain and increased girth.  She has known cirrhosis from DUKES.  Scans did not show significant fluid.  Paracentesis could not be done because of that.  GI attributed her pain to splenomegaly.  EGD was done because of nausea and vomiting.  She also developed migraine headaches and was seen by neurology.  She had good relief with one dose of baclofen and Elavil.  She was also given a dose of IV magnesium.  She continues on Aldactone.  Lasix was stopped this admission.  Dr. Cardoza recommended keeping her on  Elavil at night.  He also stopped her Wellbutrin.     for management of her diabetes.  She was on very high doses of U500 insulin.  She was managed with sliding scale and twice daily Levemir.  Dr. Burnett recommends resuming the U5 100 insulin with a high-dose sliding scale as noted in the discharge med rec.    Patient had brief episode of urinary retention which required a [place].  It was removed several days ago, and she is voiding fine on her own.      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  In addition she has a history of ITP.  Her numbers have been fairly stable.  She was followed by hematology during her stay.  She also has a history of rheumatoid arthritis.    Stable condition; fair prognosis    Exam Today: Feels better today.  Slept last night.  Headache is gone.  Lungs clear, breath sounds decreased but equal.  Heart regular without murmur.  Abdomen is distended.  She is tender at the left upper quadrant and right lateral mid quadrant (as before).  Trace edema.    Procedures Performed:  Procedure(s):  ESOPHAGOGASTRODUODENOSCOPY 10/5/18 which showed grade 1 varices.  No bleeding       Labs:  Lab Results   Component Value Date    WBC 1.88 (C) 10/11/2018    HGB 10.1 (L) 10/11/2018    HCT 32.0 (L) 10/11/2018    PLT 74 (L) 10/11/2018     Lab Results   Component Value Date     10/10/2018    K 3.7 10/10/2018     10/10/2018    CO2 26.9 10/10/2018    BUN 8 10/10/2018    CREATININE 0.73 10/10/2018    GLUCOSE 195 (H) 10/10/2018       Lab Results   Component Value Date    INR 1.37 (H) 10/10/2018     On 10/8/18:  ALT 17 AST 26 alkaline phosphatase 139 total bili 0.5 albumin 3.2 total protein 6       Consults:   Dr. Rich Cardoza     Discharge Disposition:  Home or Self Care    Discharge Medications:  Elavil 50 mg daily at bedtime as recommended by neurology     Discharge Medications      Changes to Medications       Instructions Start Date   Insulin Regular Human (Conc) 500 UNIT/ML solution pen-injector CONCENTRATED injection  Commonly known as:  HUMULIN R U-500 KWIKPEN  What changed:  · how much to take  · how to take this  · when to take this  · additional instructions   65 units with each meal, 30 units with snack and 3 units for every 50 above 150.         Continue These Medications      Instructions Start Date   ALPRAZolam 0.5 MG tablet  Commonly known as:  XANAX   TAKE ONE TABLET BY MOUTH TWICE A DAY AS NEEDED FOR ANXIETY      B-D UF III MINI PEN NEEDLES 31G X 5 MM misc  Generic drug:  Insulin Pen Needle   USE TO INJECT 5 TO 6 TIMES PER DAY      cyclobenzaprine 5 MG tablet  Commonly known as:  FLEXERIL   5 mg, Oral, 3 Times Daily PRN      cycloSPORINE 0.05 % ophthalmic emulsion  Commonly known as:  RESTASIS   1 drop, 2 Times Daily      DULoxetine 30 MG capsule  Commonly known as:  CYMBALTA   TAKE THREE CAPSULES BY MOUTH DAILY      folic acid 1 MG tablet  Commonly known as:  FOLVITE   TAKE ONE TABLET BY MOUTH DAILY      hydrOXYzine 25 MG tablet  Commonly known as:  ATARAX   50 mg, Oral, 3 Times Daily PRN      levETIRAcetam 500 MG tablet  Commonly known as:  KEPPRA   TAKE ONE TABLET BY MOUTH TWICE A DAY      LYRICA 75 MG capsule  Generic drug:  pregabalin   TAKE ONE CAPSULE BY MOUTH TWICE A DAY      MULTI-VITAMIN PO   1 tablet, Oral, Daily      OXAYDO 7.5 MG tablet   Generic drug:  OxyCODONE HCl   7.5 mg, Oral, Every 8 Hours PRN      pantoprazole 40 MG EC tablet  Commonly known as:  PROTONIX   TAKE ONE TABLET BY MOUTH DAILY      predniSONE 10 MG tablet  Commonly known as:  DELTASONE   10 mg, Oral, Daily      promethazine 25 MG tablet  Commonly known as:  PHENERGAN   TAKE ONE TABLET BY MOUTH EVERY 6 HOURS AS NEEDED FOR NAUSEA OR VOMITING      spironolactone 100 MG tablet  Commonly known as:  ALDACTONE   TAKE ONE TABLET BY MOUTH DAILY      sucralfate 1 g tablet  Commonly known as:  CARAFATE   TAKE ONE TABLET BY MOUTH TWICE A  DAY      trimethobenzamide 300 MG capsule  Commonly known as:  TIGAN   300 mg, Oral, 3 Times Daily      vitamin B-12 1000 MCG tablet  Commonly known as:  CYANOCOBALAMIN   1,000 mcg, Oral, Daily      XIFAXAN 550 MG tablet  Generic drug:  rifaximin   TAKE ONE TABLET BY MOUTH TWICE A DAY         Stop These Medications    acetaminophen 325 MG tablet  Commonly known as:  TYLENOL     buPROPion  MG 24 hr tablet  Commonly known as:  WELLBUTRIN XL     ferrous sulfate 325 (65 FE) MG tablet     furosemide 40 MG tablet  Commonly known as:  LASIX     RELISTOR 150 MG tablet  Generic drug:  Methylnaltrexone Bromide            Discharge Diet:   Diet Instructions     Diet: Specialty Diet; Thin Liquids, No Restrictions; Low Sodium       Discharge Diet:  Specialty Diet    Fluid Consistency:  Thin Liquids, No Restrictions    Specialty Diets:  Low Sodium          Activity at Discharge:   Activity Instructions     Activity as Tolerated       Other Instructions (Specify)       Take over the counter magnesium 500mg daily for one week. If migraines return, restart magnesium.          Follow-up Appointments:  Future Appointments  Date Time Provider Department Center   11/5/2018 11:00 AM LAB CHAIR 3 [facility]   11/5/2018 11:30 AM RN [facility]   11/30/2018 10:00 AM Sujata Salas MD [facility] None   12/3/2018 11:00 AM LAB CHAIR 6 [facility]   12/3/2018 11:30 AM RN [facility]   12/3/2018 2:15 PM Rich Coleman MD K [facility] None   12/11/2018 2:20 PM Merary Burnett MD K [facility] None   1/7/2019 9:30 AM LAB CHAIR 3 [facility]   1/7/2019 10:00 AM Sukhdeep Mcdowell MD K [facility]     Follow-up Information     Blanca Pitts MD Follow up in 1 week(s).    Specialty:  Internal Medicine  Why:  cirrhosis; migraines  Contact information:  [address] [place] [id number] [phone]             Merary Burnett MD Follow up in  1 week(s).    Specialty:  Endocrinology  Contact information:  [place]  [address] [address] [phone]                          Test Results Pending at Discharge: None       Rama Morton MD  10/11/18  2:34 PM    Time Spent on Discharge Activities: 40 minutes.  Discussed with patient, nurse and CCP.  Discussed with .            Electronically signed by Rama Morton MD at 10/11/2018  3:31 PM

## 2018-10-12 NOTE — OUTREACH NOTE
Prep Survey      Responses   Facility patient discharged from?  [facility]   Is patient eligible?  Yes   Discharge diagnosis  Abdominal pain, migrain without aura, Urinary retention, UGI bleed, intractable vomiting with nausea, Cirrhosis of liver with ascites, gastroparesis, portal Hypertension, pancytopenia, DM II uncontrolled with neuropathy, Rheumatoid arthritis, anxiety and depression   Does the patient have one of the following disease processes/diagnoses(primary or secondary)?  Other   Does the patient have Home health ordered?  No   Is there a DME ordered?  No   What DME was ordered?  Pt has a cane and walker at home.    Comments regarding appointments  Pt to schedule follow up appointments.   Prep survey completed?  Yes          Blanca Vanegas RN

## 2018-10-12 NOTE — PROGRESS NOTES
Case Management Discharge Note    Final Note: home with family assist denies needs    Destination     No service has been selected for the patient.      Durable Medical Equipment     No service has been selected for the patient.      Dialysis/Infusion     No service has been selected for the patient.      Home Medical Care     No service has been selected for the patient.      Social Care     No service has been selected for the patient.        Other: Other (private )    Final Discharge Disposition Code: 01 - home or self-care

## 2018-10-15 ENCOUNTER — READMISSION MANAGEMENT (OUTPATIENT)
Dept: CALL CENTER | Facility: HOSPITAL | Age: 58
End: 2018-10-15

## 2018-10-15 NOTE — OUTREACH NOTE
Medical Week 1 Survey      Responses   Facility patient discharged from?  [place]   Does the patient have one of the following disease processes/diagnoses(primary or secondary)?  Other   Is there a successful TCM telephone encounter documented?  No   Week 1 attempt successful?  Yes   Call start time  1649   Rescheduled  Rescheduled-pt requested   Call end time  1649          Katheryn Israel RN

## 2018-10-15 NOTE — TELEPHONE ENCOUNTER
Spoke with Emma Zabala about her sister's status. Her liver disease is serious but stable. Biggest problem immediately is proper blood sugar control. Psychological therapy would also be welcomed.

## 2018-10-17 ENCOUNTER — OFFICE VISIT (OUTPATIENT)
Dept: INTERNAL MEDICINE | Facility: CLINIC | Age: 58
End: 2018-10-17

## 2018-10-17 VITALS
HEART RATE: 120 BPM | WEIGHT: 178 LBS | OXYGEN SATURATION: 99 % | SYSTOLIC BLOOD PRESSURE: 130 MMHG | HEIGHT: 66 IN | BODY MASS INDEX: 28.61 KG/M2 | DIASTOLIC BLOOD PRESSURE: 74 MMHG

## 2018-10-17 DIAGNOSIS — G43.009 MIGRAINE WITHOUT AURA AND WITHOUT STATUS MIGRAINOSUS, NOT INTRACTABLE: ICD-10-CM

## 2018-10-17 DIAGNOSIS — F41.9 ANXIETY: ICD-10-CM

## 2018-10-17 DIAGNOSIS — E78.5 HYPERLIPIDEMIA, UNSPECIFIED HYPERLIPIDEMIA TYPE: Primary | ICD-10-CM

## 2018-10-17 PROCEDURE — 99213 OFFICE O/P EST LOW 20 MIN: CPT | Performed by: INTERNAL MEDICINE

## 2018-10-17 RX ORDER — ALPRAZOLAM 0.5 MG/1
0.5 TABLET ORAL 2 TIMES DAILY PRN
Qty: 60 TABLET | Refills: 1 | Status: SHIPPED | OUTPATIENT
Start: 2018-10-17 | End: 2021-02-17 | Stop reason: SDUPTHER

## 2018-10-17 RX ORDER — CYCLOBENZAPRINE HCL 5 MG
TABLET ORAL
Qty: 30 TABLET | Refills: 1 | Status: SHIPPED | OUTPATIENT
Start: 2018-10-17 | End: 2018-12-13

## 2018-10-17 RX ORDER — SUCRALFATE 1 G/1
TABLET ORAL
Qty: 60 TABLET | Refills: 0 | Status: SHIPPED | OUTPATIENT
Start: 2018-10-17 | End: 2018-12-02 | Stop reason: SDUPTHER

## 2018-10-17 NOTE — PROGRESS NOTES
Chief Complaint   Patient presents with   • Hospital F/U     for abd pain   • Hyperlipidemia     4 mo not fasting   • Anxiety       Subjective   Silvia Zabala is a 58 y.o. female.     History of Present Illness     Hospital follow-up: She was recently hospitalized with abdominal pain, nausea, vomiting, poorly controlled diabetes, migraines.  She had abdominal swelling.  She has history of DUKES, splenomegaly, cirrhosis of the liver, pancytopenia.  She has follow-ups scheduled with gastroenterology, hematology and endocrinology.  She has ongoing problems with anxiety.  This despite alprazolam, amitriptyline and duloxetine.  She needs a refill on alprazolam.  She is not currently in counseling.  She has history of hyperlipidemia.  She tries to manage this with diet.  Her most recent lipid panel was done 8/9/2018.  Total cholesterol 201, triglycerides 105, HDL 74, .  She states that she has had a migraine headache ever since discharge.  Her neurology consultation in the hospital suggested she take both amitriptyline and Flexeril at bedtime.    The following portions of the patient's history were reviewed and updated as appropriate: allergies, current medications, past family history, past medical history, past social history, past surgical history and problem list.    Review of Systems   Constitutional: Positive for fatigue. Negative for appetite change.   Respiratory: Negative for shortness of breath and wheezing.    Cardiovascular: Negative for chest pain and leg swelling.   Gastrointestinal: Positive for abdominal distention, nausea and vomiting.   Psychiatric/Behavioral: Positive for depressed mood. Negative for self-injury. The patient is nervous/anxious.          Current Outpatient Prescriptions:   •  ALPRAZolam (XANAX) 0.5 MG tablet, Take 1 tablet by mouth 2 (Two) Times a Day As Needed for Anxiety., Disp: 60 tablet, Rfl: 1  •  amitriptyline (ELAVIL) 50 MG tablet, Take 1 tablet by mouth Every Night.,  Disp: 30 tablet, Rfl: 0  •  B-D UF III MINI PEN NEEDLES 31G X 5 MM misc, USE TO INJECT 5 TO 6 TIMES PER DAY, Disp: 200 each, Rfl: 2  •  cyclobenzaprine (FLEXERIL) 5 MG tablet, One at bedtime, Disp: 30 tablet, Rfl: 1  •  cycloSPORINE (RESTASIS) 0.05 % ophthalmic emulsion, 1 drop 2 (two) times a day., Disp: , Rfl:   •  DULoxetine (CYMBALTA) 30 MG capsule, TAKE THREE CAPSULES BY MOUTH DAILY, Disp: 90 capsule, Rfl: 3  •  folic acid (FOLVITE) 1 MG tablet, TAKE ONE TABLET BY MOUTH DAILY, Disp: 30 tablet, Rfl: 3  •  hydrOXYzine (ATARAX) 25 MG tablet, Take 50 mg by mouth 3 (Three) Times a Day As Needed for Itching., Disp: , Rfl:   •  Insulin Regular Human, Conc, (HUMULIN R U-500 KWIKPEN) 500 UNIT/ML solution pen-injector CONCENTRATED injection, 65 units with each meal, 30 units with snack and 3 units for every 50 above 150., Disp: 16 pen, Rfl: 11  •  levETIRAcetam (KEPPRA) 500 MG tablet, TAKE ONE TABLET BY MOUTH TWICE A DAY, Disp: 60 tablet, Rfl: 0  •  LYRICA 75 MG capsule, TAKE ONE CAPSULE BY MOUTH TWICE A DAY, Disp: 60 capsule, Rfl: 0  •  Multiple Vitamin (MULTI-VITAMIN PO), Take 1 tablet by mouth Daily., Disp: , Rfl:   •  OxyCODONE HCl (OXAYDO) 7.5 MG tablet , Take 7.5 mg by mouth Every 8 (Eight) Hours As Needed., Disp: , Rfl:   •  pantoprazole (PROTONIX) 40 MG EC tablet, TAKE ONE TABLET BY MOUTH DAILY, Disp: 30 tablet, Rfl: 3  •  predniSONE (DELTASONE) 10 MG tablet, Take 1 tablet by mouth Daily., Disp: 90 tablet, Rfl: 3  •  promethazine (PHENERGAN) 25 MG tablet, TAKE ONE TABLET BY MOUTH EVERY 6 HOURS AS NEEDED FOR NAUSEA OR VOMITING, Disp: 30 tablet, Rfl: 0  •  spironolactone (ALDACTONE) 100 MG tablet, TAKE ONE TABLET BY MOUTH DAILY, Disp: 30 tablet, Rfl: 3  •  sucralfate (CARAFATE) 1 g tablet, TAKE ONE TABLET BY MOUTH TWICE A DAY, Disp: 60 tablet, Rfl: 1  •  trimethobenzamide (TIGAN) 300 MG capsule, Take 300 mg by mouth 3 (Three) Times a Day., Disp: , Rfl:   •  vitamin B-12 (CYANOCOBALAMIN) 1000 MCG tablet, Take 1,000  "mcg by mouth daily., Disp: , Rfl:   •  XIFAXAN 550 MG tablet, TAKE ONE TABLET BY MOUTH TWICE A DAY, Disp: 60 tablet, Rfl: 3        Objective     /74 (BP Location: Left arm, Patient Position: Sitting)   Pulse 120   Ht 168.9 cm (66.5\")   Wt 80.7 kg (178 lb)   SpO2 99%   BMI 28.30 kg/m²     Physical Exam   Constitutional: She appears well-developed and well-nourished. No distress.   Eyes: No scleral icterus.   Cardiovascular: Normal rate, regular rhythm and normal heart sounds.    Pulmonary/Chest: Effort normal and breath sounds normal. No respiratory distress.   Abdominal: She exhibits distension.   Lymphadenopathy:     She has no cervical adenopathy.   Neurological:   Lip smacking present.  Apparently, involuntary.   Psychiatric: Her speech is normal. Her mood appears anxious. She exhibits a depressed mood.   Nursing note and vitals reviewed.        Assessment/Plan   Silvia was seen today for hospital f/u, hyperlipidemia and anxiety.    Diagnoses and all orders for this visit:    Hyperlipidemia, unspecified hyperlipidemia type    Migraine without aura and without status migrainosus, not intractable  -     Ambulatory Referral to Neurology    Anxiety  -     Ambulatory Referral to Psychology  -     ALPRAZolam (XANAX) 0.5 MG tablet; Take 1 tablet by mouth 2 (Two) Times a Day As Needed for Anxiety.    Other orders  -     cyclobenzaprine (FLEXERIL) 5 MG tablet; One at bedtime      Discussed lipsmacking.  She blames this on poorly fitting dentures.  Encouraged her to go to counseling.  Neurology consultation in regard to migraines.  I discouraged her from taking Flexeril 3 times daily routinely.  Advised her to limit it to just once at bedtime as recommended by her neurologist.  Encouraged her to continue prudent diet.         "

## 2018-10-18 DIAGNOSIS — F41.9 ANXIETY: ICD-10-CM

## 2018-10-19 ENCOUNTER — READMISSION MANAGEMENT (OUTPATIENT)
Dept: CALL CENTER | Facility: HOSPITAL | Age: 58
End: 2018-10-19

## 2018-10-19 RX ORDER — ALPRAZOLAM 0.5 MG/1
TABLET ORAL
Qty: 60 TABLET | Refills: 1 | OUTPATIENT
Start: 2018-10-19 | End: 2018-12-03 | Stop reason: SDUPTHER

## 2018-10-19 NOTE — OUTREACH NOTE
Medical Week 2 Survey      Responses   Facility patient discharged from?  [place]   Does the patient have one of the following disease processes/diagnoses(primary or secondary)?  Other   Call start time  0945   Discharge diagnosis  Abdominal pain, migrain without aura, Urinary retention, UGI bleed, intractable vomiting with nausea, Cirrhosis of liver with ascites, gastroparesis, portal Hypertension, pancytopenia, DM II uncontrolled with neuropathy, Rheumatoid arthritis, anxiety and depression   Call end time  0954   Meds reviewed with patient/caregiver?  Yes   Is the patient having any side effects they believe may be caused by any medication additions or changes?  No   Does the patient have all medications ordered at discharge?  Yes   Is the patient taking all medications as directed (includes completed medication regime)?  Yes   Does the patient have a primary care provider?   Yes   Does the patient have an appointment with their PCP within 7 days of discharge?  Yes   Has the patient kept scheduled appointments due by today?  Yes   Has home health visited the patient within 72 hours of discharge?  N/A   Psychosocial issues?  No   Did the patient receive a copy of their discharge instructions?  Yes   Nursing interventions  Reviewed instructions with patient   What is the patient's perception of their health status since discharge?  Improving   Is the patient/caregiver able to teach back signs and symptoms related to disease process for when to call PCP?  Yes   Is the patient/caregiver able to teach back signs and symptoms related to disease process for when to call 911?  Yes   Additional teach back comments  Pt say she still has abd pain, but no pressure. Still has nausea with out vomiting. Blood Sugars are good          Jamee Gooden RN

## 2018-10-29 ENCOUNTER — READMISSION MANAGEMENT (OUTPATIENT)
Dept: CALL CENTER | Facility: HOSPITAL | Age: 58
End: 2018-10-29

## 2018-10-29 NOTE — OUTREACH NOTE
Medical Week 2 Survey      Responses   Facility patient discharged from?  [place]   Does the patient have one of the following disease processes/diagnoses(primary or secondary)?  Other   Week 2 attempt successful?  Yes   Call start time  1230   Revoke  Decline to participate [Mother in law answered. States that patient is in [place] for the next few weeks. ]   Call end time  1233   Is patient permission given to speak with other caregiver?  Yes   List who call center can speak with  Kary, Mother in law, emergency contact.   Person spoke with today (if not patient) and relationship  Mother in Law, Kary Emmanuel RN

## 2018-11-02 ENCOUNTER — RESULTS ENCOUNTER (OUTPATIENT)
Dept: ONCOLOGY | Facility: CLINIC | Age: 58
End: 2018-11-02

## 2018-11-02 DIAGNOSIS — K74.60 CIRRHOSIS OF LIVER WITHOUT ASCITES, UNSPECIFIED HEPATIC CIRRHOSIS TYPE (HCC): ICD-10-CM

## 2018-11-02 DIAGNOSIS — D69.3 ACUTE ITP (HCC): ICD-10-CM

## 2018-11-02 DIAGNOSIS — D61.818 PANCYTOPENIA (HCC): ICD-10-CM

## 2018-11-02 DIAGNOSIS — D73.1 HYPERSPLENISM: ICD-10-CM

## 2018-11-05 ENCOUNTER — APPOINTMENT (OUTPATIENT)
Dept: LAB | Facility: HOSPITAL | Age: 58
End: 2018-11-05

## 2018-11-05 ENCOUNTER — APPOINTMENT (OUTPATIENT)
Dept: ONCOLOGY | Facility: HOSPITAL | Age: 58
End: 2018-11-05

## 2018-11-07 RX ORDER — LEVETIRACETAM 500 MG/1
TABLET ORAL
Qty: 60 TABLET | Refills: 0 | Status: SHIPPED | OUTPATIENT
Start: 2018-11-07 | End: 2018-12-13

## 2018-11-19 ENCOUNTER — HOSPITAL ENCOUNTER (EMERGENCY)
Facility: HOSPITAL | Age: 58
Discharge: HOME | End: 2018-11-19
Attending: EMERGENCY MEDICINE | Admitting: EMERGENCY MEDICINE

## 2018-11-19 ENCOUNTER — APPOINTMENT (OUTPATIENT)
Dept: CT IMAGING | Facility: HOSPITAL | Age: 58
End: 2018-11-19

## 2018-11-19 VITALS
BODY MASS INDEX: 27.32 KG/M2 | WEIGHT: 170 LBS | TEMPERATURE: 98.8 F | DIASTOLIC BLOOD PRESSURE: 71 MMHG | HEIGHT: 66 IN | OXYGEN SATURATION: 100 % | RESPIRATION RATE: 17 BRPM | HEART RATE: 101 BPM | SYSTOLIC BLOOD PRESSURE: 142 MMHG

## 2018-11-19 DIAGNOSIS — E11.65 POORLY CONTROLLED DIABETES MELLITUS (HCC): ICD-10-CM

## 2018-11-19 DIAGNOSIS — G43.809 OTHER MIGRAINE WITHOUT STATUS MIGRAINOSUS, NOT INTRACTABLE: Primary | ICD-10-CM

## 2018-11-19 LAB
ALBUMIN SERPL-MCNC: 3.4 G/DL (ref 3.5–5.2)
ALBUMIN/GLOB SERPL: 0.9 G/DL
ALP SERPL-CCNC: 289 U/L (ref 39–117)
ALT SERPL W P-5'-P-CCNC: 20 U/L (ref 1–33)
ANION GAP SERPL CALCULATED.3IONS-SCNC: 20.5 MMOL/L
AST SERPL-CCNC: 20 U/L (ref 1–32)
BASOPHILS # BLD AUTO: 0.02 10*3/MM3 (ref 0–0.2)
BASOPHILS NFR BLD AUTO: 0.8 % (ref 0–1.5)
BILIRUB SERPL-MCNC: 1.1 MG/DL (ref 0.1–1.2)
BILIRUB UR QL STRIP: NEGATIVE
BUN BLD-MCNC: 12 MG/DL (ref 6–20)
BUN/CREAT SERPL: 11.5 (ref 7–25)
CALCIUM SPEC-SCNC: 9.5 MG/DL (ref 8.6–10.5)
CHLORIDE SERPL-SCNC: 92 MMOL/L (ref 98–107)
CLARITY UR: CLEAR
CO2 SERPL-SCNC: 18.5 MMOL/L (ref 22–29)
COLOR UR: YELLOW
CREAT BLD-MCNC: 1.04 MG/DL (ref 0.57–1)
DEPRECATED RDW RBC AUTO: 48.8 FL (ref 37–54)
EOSINOPHIL # BLD AUTO: 0.06 10*3/MM3 (ref 0–0.7)
EOSINOPHIL NFR BLD AUTO: 2.3 % (ref 0.3–6.2)
ERYTHROCYTE [DISTWIDTH] IN BLOOD BY AUTOMATED COUNT: 16.2 % (ref 11.7–13)
GFR SERPL CREATININE-BSD FRML MDRD: 55 ML/MIN/1.73
GLOBULIN UR ELPH-MCNC: 3.7 GM/DL
GLUCOSE BLD-MCNC: 580 MG/DL (ref 65–99)
GLUCOSE BLDC GLUCOMTR-MCNC: 482 MG/DL (ref 70–130)
GLUCOSE UR STRIP-MCNC: ABNORMAL MG/DL
HCT VFR BLD AUTO: 35.7 % (ref 35.6–45.5)
HGB BLD-MCNC: 11.6 G/DL (ref 11.9–15.5)
HGB UR QL STRIP.AUTO: NEGATIVE
HOLD SPECIMEN: NORMAL
HOLD SPECIMEN: NORMAL
IMM GRANULOCYTES # BLD: 0.01 10*3/MM3 (ref 0–0.03)
IMM GRANULOCYTES NFR BLD: 0.4 % (ref 0–0.5)
KETONES UR QL STRIP: ABNORMAL
LEUKOCYTE ESTERASE UR QL STRIP.AUTO: NEGATIVE
LYMPHOCYTES # BLD AUTO: 0.61 10*3/MM3 (ref 0.9–4.8)
LYMPHOCYTES NFR BLD AUTO: 23.7 % (ref 19.6–45.3)
MCH RBC QN AUTO: 27 PG (ref 26.9–32)
MCHC RBC AUTO-ENTMCNC: 32.5 G/DL (ref 32.4–36.3)
MCV RBC AUTO: 83 FL (ref 80.5–98.2)
MONOCYTES # BLD AUTO: 0.34 10*3/MM3 (ref 0.2–1.2)
MONOCYTES NFR BLD AUTO: 13.2 % (ref 5–12)
NEUTROPHILS # BLD AUTO: 1.54 10*3/MM3 (ref 1.9–8.1)
NEUTROPHILS NFR BLD AUTO: 60 % (ref 42.7–76)
NITRITE UR QL STRIP: NEGATIVE
PH UR STRIP.AUTO: 5.5 [PH] (ref 5–8)
PLATELET # BLD AUTO: 133 10*3/MM3 (ref 140–500)
PMV BLD AUTO: 10 FL (ref 6–12)
POTASSIUM BLD-SCNC: 3.7 MMOL/L (ref 3.5–5.2)
PROT SERPL-MCNC: 7.1 G/DL (ref 6–8.5)
PROT UR QL STRIP: NEGATIVE
RBC # BLD AUTO: 4.3 10*6/MM3 (ref 3.9–5.2)
SODIUM BLD-SCNC: 131 MMOL/L (ref 136–145)
SP GR UR STRIP: >=1.03 (ref 1–1.03)
UROBILINOGEN UR QL STRIP: ABNORMAL
WBC NRBC COR # BLD: 2.57 10*3/MM3 (ref 4.5–10.7)
WHOLE BLOOD HOLD SPECIMEN: NORMAL
WHOLE BLOOD HOLD SPECIMEN: NORMAL

## 2018-11-19 PROCEDURE — 96374 THER/PROPH/DIAG INJ IV PUSH: CPT

## 2018-11-19 PROCEDURE — 25010000002 METOCLOPRAMIDE PER 10 MG: Performed by: EMERGENCY MEDICINE

## 2018-11-19 PROCEDURE — 80053 COMPREHEN METABOLIC PANEL: CPT | Performed by: PHYSICIAN ASSISTANT

## 2018-11-19 PROCEDURE — 81003 URINALYSIS AUTO W/O SCOPE: CPT | Performed by: PHYSICIAN ASSISTANT

## 2018-11-19 PROCEDURE — 96361 HYDRATE IV INFUSION ADD-ON: CPT

## 2018-11-19 PROCEDURE — 99284 EMERGENCY DEPT VISIT MOD MDM: CPT

## 2018-11-19 PROCEDURE — 25010000002 KETOROLAC TROMETHAMINE PER 15 MG: Performed by: EMERGENCY MEDICINE

## 2018-11-19 PROCEDURE — 82962 GLUCOSE BLOOD TEST: CPT

## 2018-11-19 PROCEDURE — 63710000001 INSULIN REGULAR HUMAN PER 5 UNITS: Performed by: EMERGENCY MEDICINE

## 2018-11-19 PROCEDURE — 25010000002 DIPHENHYDRAMINE PER 50 MG: Performed by: EMERGENCY MEDICINE

## 2018-11-19 PROCEDURE — 96375 TX/PRO/DX INJ NEW DRUG ADDON: CPT

## 2018-11-19 PROCEDURE — 85025 COMPLETE CBC W/AUTO DIFF WBC: CPT | Performed by: PHYSICIAN ASSISTANT

## 2018-11-19 PROCEDURE — 70450 CT HEAD/BRAIN W/O DYE: CPT

## 2018-11-19 RX ORDER — METOCLOPRAMIDE HYDROCHLORIDE 5 MG/ML
10 INJECTION INTRAMUSCULAR; INTRAVENOUS ONCE
Status: COMPLETED | OUTPATIENT
Start: 2018-11-19 | End: 2018-11-19

## 2018-11-19 RX ORDER — DIPHENHYDRAMINE HYDROCHLORIDE 50 MG/ML
25 INJECTION INTRAMUSCULAR; INTRAVENOUS ONCE
Status: COMPLETED | OUTPATIENT
Start: 2018-11-19 | End: 2018-11-19

## 2018-11-19 RX ORDER — KETOROLAC TROMETHAMINE 30 MG/ML
30 INJECTION, SOLUTION INTRAMUSCULAR; INTRAVENOUS ONCE
Status: COMPLETED | OUTPATIENT
Start: 2018-11-19 | End: 2018-11-19

## 2018-11-19 RX ORDER — SODIUM CHLORIDE 0.9 % (FLUSH) 0.9 %
10 SYRINGE (ML) INJECTION AS NEEDED
Status: DISCONTINUED | OUTPATIENT
Start: 2018-11-19 | End: 2018-11-19 | Stop reason: HOSPADM

## 2018-11-19 RX ADMIN — KETOROLAC TROMETHAMINE 30 MG: 30 INJECTION, SOLUTION INTRAMUSCULAR at 19:27

## 2018-11-19 RX ADMIN — SODIUM CHLORIDE 1000 ML: 9 INJECTION, SOLUTION INTRAVENOUS at 18:06

## 2018-11-19 RX ADMIN — INSULIN HUMAN 5 UNITS: 100 INJECTION, SOLUTION PARENTERAL at 18:51

## 2018-11-19 RX ADMIN — DIPHENHYDRAMINE HYDROCHLORIDE 25 MG: 50 INJECTION, SOLUTION INTRAMUSCULAR; INTRAVENOUS at 18:08

## 2018-11-19 RX ADMIN — METOCLOPRAMIDE 10 MG: 5 INJECTION, SOLUTION INTRAMUSCULAR; INTRAVENOUS at 18:10

## 2018-11-19 NOTE — ED PROVIDER NOTES
EMERGENCY DEPARTMENT ENCOUNTER    CHIEF COMPLAINT  Chief Complaint: Headache  History given by: pt  History limited by: none  Room Number: 25/25  PMD: Blanca Pitts MD      HPI:  Pt is a 58 y.o. female who presents with left sided headache that started a week ago that is similar to her migraine headaches which she has had since she was a child. Pt admits to vomiting (last episode this morning), nausea, sonophobia, and photophobia. Pt reports her PCP took her off of Elavil because she couldn't take elavil and flexeril at the same time. Pt also reports she is no longer taking prednisone. Pt reports she fell a week ago while in [place].     Duration - 1 week  Onset - gradual  Timing - constant  Location of Headache - left-sided unilateral  Radiation - without radiation  Description of Headache - unilateral in the left frontal area  Intensity/Severity - severe  Progression - unchanged  Associated Symptoms - nausea, vomiting, sonophobia, photophobia  Aura -None  Aggravating Factors- No increased intracranial pressure symptoms  Alleviating Factors - unable to obtain relief with OTC meds  History of Headaches- migraine.  The migraines usually occur infrequent and typically last several days.    Treatment Prior to Arrival - None    PAST MEDICAL HISTORY  Active Ambulatory Problems     Diagnosis Date Noted   • Cirrhosis of liver (CMS/HCC) 03/08/2016   • Rheumatoid arthritis (CMS/HCC) 03/08/2016   • Anxiety and depression 03/08/2016   • Type 2 diabetes mellitus, uncontrolled, with neuropathy (CMS/HCC) 03/15/2016   • Fibrositis 03/15/2016   • Change in blood platelet count 03/15/2016   • Pancytopenia (CMS/HCC) 04/12/2016   • Hypersplenism 04/12/2016   • Nausea 06/14/2016   • DUKES (nonalcoholic steatohepatitis) 06/14/2016   • Hyperlipidemia    • Abdominal pain 02/14/2017   • Hematemesis 02/15/2017   • Ascites 02/21/2017   • Portal hypertension (CMS/HCC) 02/22/2017   • Systemic lupus (CMS/HCC) 02/22/2017   • Secondary  esophageal varices without bleeding (CMS/HCC) 02/22/2017   • Gastroparesis 10/17/2017   • Cirrhosis of liver with ascites (CMS/HCC) 11/17/2017   • Diabetic ketoacidosis without coma associated with type 2 diabetes mellitus (CMS/HCC) 12/16/2017   • Diabetic peripheral neuropathy (CMS/HCC) 12/17/2017   • History of seizure disorder 12/17/2017   • Hepatic encephalopathy (CMS/HCC) 05/08/2018   • Abnormal finding of blood chemistry  05/08/2018   • Fever 05/17/2018   • Acute ITP (CMS/HCC) 05/18/2018   • Degeneration of lumbosacral intervertebral disc 05/30/2018   • Seizure (CMS/HCC) 05/30/2018   • Spondylosis without myelopathy 05/30/2018   • Intractable vomiting with nausea 10/03/2018   • UGI bleed 10/04/2018   • Migraine without aura 10/11/2018   • Urinary retention 10/11/2018     Resolved Ambulatory Problems     Diagnosis Date Noted   • Secondary esophageal varices with bleeding (CMS/HCC) 03/07/2017   • Upper GI bleed 04/25/2017   • Thrombocytopenia (CMS/HCC) 05/16/2018     Past Medical History:   Diagnosis Date   • Anemia    • Anxiety    • Arthritis    • Broken shoulder    • Chromosomal abnormality    • Cirrhosis (CMS/HCC)    • Colon polyps    • Deafness    • Depression    • Diabetes mellitus (CMS/HCC)    • Duodenal ulcer with hemorrhage    • Esophageal varices determined by endoscopy (CMS/HCC)    • Fibromyalgia    • Gallbladder disease    • Gastric varices    • Gastroparesis    • Glaucoma    • Granuloma annulare    • H/O B12 deficiency    • H/O Bleeding ulcer    • H/O mixed connective tissue disorder    • Hemorrhoids    • Hiatal hernia    • History of transfusion    • Hx of being hospitalized    • Hyperlipidemia    • Migraine    • DUKES (nonalcoholic steatohepatitis) 11/2016   • BRICE (obstructive sleep apnea)    • Pancreas divisum    • Pancytopenia (CMS/HCC)    • Peptic ulcer disease    • PONV (postoperative nausea and vomiting)    • RA (rheumatoid arthritis) (CMS/HCC)    • Seizure disorder (CMS/HCC)    • Seizures  "(CMS/HCC)    • Systemic lupus (CMS/HCC)        PAST SURGICAL HISTORY  Past Surgical History:   Procedure Laterality Date   • BLADDER REPAIR     • CATARACT EXTRACTION     • CHOLECYSTECTOMY     • ENDOSCOPY AND COLONOSCOPY      Dr. Rich Coleman with findings of candida esophagitis   • EYE SURGERY     • HYSTERECTOMY      partial   • JOINT REPLACEMENT     • KNEE SURGERY Right     \"right knee recontstruction\"   • LIVER BIOPSY  2015   • MASS EXCISION     • STOMACH SURGERY         FAMILY HISTORY  Family History   Problem Relation Age of Onset   • Liver disease Other    • Depression Other    • Heart disease Other    • Hypertension Other    • Diabetes Other    • Breast cancer Other    • Brain cancer Other    • Uterine cancer Other    • Colon cancer Other    • Leukemia Other    • Colon cancer Maternal Aunt    • Hypertension Mother    • Diabetes type II Mother    • Rheum arthritis Mother    • Liver disease Mother         DUKES   • Heart disease Father    • Diabetes type II Father    • Diabetes type II Sister    • Lupus Sister    • Malig Hyperthermia Neg Hx        SOCIAL HISTORY  Social History     Socioeconomic History   • Marital status:      Spouse name: Jose   • Number of children: Not on file   • Years of education: Not on file   • Highest education level: Not on file   Social Needs   • Financial resource strain: Not on file   • Food insecurity - worry: Not on file   • Food insecurity - inability: Not on file   • Transportation needs - medical: Not on file   • Transportation needs - non-medical: Not on file   Occupational History     Employer: DISABLED   Tobacco Use   • Smoking status: Former Smoker     Packs/day: 0.00     Years: 0.00     Pack years: 0.00     Last attempt to quit: 2015     Years since quittin.9   • Smokeless tobacco: Never Used   Substance and Sexual Activity   • Alcohol use: No   • Drug use: No   • Sexual activity: Defer   Other Topics Concern   • Not on file "   Social History Narrative    Moved to [place] from [place]. She is currently medically disabled.       ALLERGIES  Albuterol; Tramadol; Lactulose; and Quinine derivatives    REVIEW OF SYSTEMS  Review of Systems   Constitutional: Negative for fever.   HENT: Negative for sore throat.         Sonophobia   Eyes: Positive for photophobia.   Respiratory: Negative for cough and shortness of breath.    Cardiovascular: Negative for chest pain.   Gastrointestinal: Positive for nausea and vomiting. Negative for abdominal pain and diarrhea.   Genitourinary: Negative for dysuria.   Musculoskeletal: Negative for neck pain.   Skin: Negative for rash.   Allergic/Immunologic: Negative.    Neurological: Positive for headaches. Negative for weakness and numbness.   Hematological: Negative.    Psychiatric/Behavioral: Negative.    All other systems reviewed and are negative.      PHYSICAL EXAM  ED Triage Vitals [11/19/18 1536]   Temp Heart Rate Resp BP SpO2   98.8 °F (37.1 °C) 112 16 125/72 100 %      Temp src Heart Rate Source Patient Position BP Location FiO2 (%)   Oral -- -- -- --       Physical Exam   Constitutional: She is oriented to person, place, and time. No distress.   Appears uncomfortable. Sleeping mask covering eyes due to photophobia   HENT:   Head: Normocephalic and atraumatic.   Eyes: EOM are normal. Pupils are equal, round, and reactive to light.   Neck: Normal range of motion. Neck supple.   Cardiovascular: Normal rate, regular rhythm and normal heart sounds.   Pulmonary/Chest: Effort normal and breath sounds normal. No respiratory distress.   Abdominal: Soft. There is no tenderness. There is no rebound and no guarding.   Musculoskeletal: Normal range of motion. She exhibits no edema.   MIKE is in a sling due to recent fracture   Neurological: She is alert and oriented to person, place, and time. She has normal sensation and normal strength.   No focal deficits   Skin: Skin is warm and dry. No rash noted.    Psychiatric: Mood and affect normal.   Nursing note and vitals reviewed.      LAB RESULTS  Lab Results (last 24 hours)     Procedure Component Value Units Date/Time    CBC & Differential [[id number]] Collected:  11/19/18 1557    Specimen:  Blood Updated:  11/19/18 1611    Narrative:       The following orders were created for panel order CBC & Differential.  Procedure                               Abnormality         Status                     ---------                               -----------         ------                     CBC Auto Differential[[id number]]        Abnormal            Final result                 Please view results for these tests on the individual orders.    Comprehensive Metabolic Panel [[id number]]  (Abnormal) Collected:  11/19/18 1557    Specimen:  Blood Updated:  11/19/18 1642     Glucose 580 mg/dL      BUN 12 mg/dL      Creatinine 1.04 mg/dL      Sodium 131 mmol/L      Potassium 3.7 mmol/L      Chloride 92 mmol/L      CO2 18.5 mmol/L      Calcium 9.5 mg/dL      Total Protein 7.1 g/dL      Albumin 3.40 g/dL      ALT (SGPT) 20 U/L      AST (SGOT) 20 U/L      Alkaline Phosphatase 289 U/L      Total Bilirubin 1.1 mg/dL      eGFR Non African Amer 55 mL/min/1.73      Globulin 3.7 gm/dL      A/G Ratio 0.9 g/dL      BUN/Creatinine Ratio 11.5     Anion Gap 20.5 mmol/L     CBC Auto Differential [[id number]]  (Abnormal) Collected:  11/19/18 1557    Specimen:  Blood Updated:  11/19/18 1611     WBC 2.57 10*3/mm3      RBC 4.30 10*6/mm3      Hemoglobin 11.6 g/dL      Hematocrit 35.7 %      MCV 83.0 fL      MCH 27.0 pg      MCHC 32.5 g/dL      RDW 16.2 %      RDW-SD 48.8 fl      MPV 10.0 fL      Platelets 133 10*3/mm3      Neutrophil % 60.0 %      Lymphocyte % 23.7 %      Monocyte % 13.2 %      Eosinophil % 2.3 %      Basophil % 0.8 %      Immature Grans % 0.4 %      Neutrophils, Absolute 1.54 10*3/mm3      Lymphocytes, Absolute 0.61 10*3/mm3      Monocytes, Absolute 0.34 10*3/mm3      Eosinophils,  Absolute 0.06 10*3/mm3      Basophils, Absolute 0.02 10*3/mm3      Immature Grans, Absolute 0.01 10*3/mm3     Urinalysis With Microscopic If Indicated (No Culture) - Urine, Clean Catch [[id number]]  (Abnormal) Collected:  11/19/18 1725    Specimen:  Urine, Clean Catch Updated:  11/19/18 1754     Color, UA Yellow     Appearance, UA Clear     pH, UA 5.5     Specific Gravity, UA >=1.030     Glucose, UA >=1000 mg/dL (3+)     Ketones, UA 40 mg/dL (2+)     Bilirubin, UA Negative     Blood, UA Negative     Protein, UA Negative     Leuk Esterase, UA Negative     Nitrite, UA Negative     Urobilinogen, UA 0.2 E.U./dL    Narrative:       Urine microscopic not indicated.    POC Glucose Once [[id number]]  (Abnormal) Collected:  11/19/18 1830    Specimen:  Blood Updated:  11/19/18 1832     Glucose 482 mg/dL           I ordered the above labs and reviewed the results    RADIOLOGY  CT Head Without Contrast   Final Result           No acute intracranial hemorrhage or hydrocephalus. If there is   further clinical concern, MRI could be considered for further   evaluation.       This report was finalized on 11/19/2018 4:09 PM by Dr. Crispin Li M.D.               I ordered the above noted radiological studies. Interpreted by radiologist. Reviewed by me in PACS.       PROCEDURES  Procedures      PROGRESS AND CONSULTS  ED Course as of Nov 19 1953   Mon Nov 19, 2018   1545 C/o worsening HA for the last week after a fall. Pt also c/o sono and photophobia, N/V. She reports a hx of migraines in the past, but states her HA today feels different due to increased eye symptoms.   [KA]      ED Course User Index  [KA] Sanjuana Serrano PA     1754  Ordered reglan for vomiting and benadryl for headache, and IV fluids.     1835  Ordered POC glucose for further viewing. Ordered insulin for hyperglycemia.     1918  Rechecked patient who is resting but still complains of headache. Discussed all lab and test results. Discussed plan to give toradol  for pain. Pt understands and agrees with the plan. All questions have been answered.  Ordered toradol for pain    1949  Nurse reports pt feels much better after toradol.    MEDICAL DECISION MAKING  Results were reviewed/discussed with the patient and they were also made aware of online access. Pt also made aware that some labs, such as cultures, will not be resulted during ER visit and follow up with PMD is necessary.     MDM  Number of Diagnoses or Management Options     Amount and/or Complexity of Data Reviewed  Clinical lab tests: ordered and reviewed (WBC 2.57, GFR 55, glucose 580)  Tests in the radiology section of CPT®: ordered and reviewed (Head CT - NAD)  Decide to obtain previous medical records or to obtain history from someone other than the patient: yes (EPIC)  Review and summarize past medical records: yes (10/2018 admitted for nausea, vomiting, abdominal pain. Pt has a history of gastroparesis and cirrhosis/DUKES. Pt was seen by Dr Cardoza in hospital when she developed a migraine headache. Baclofen and elavil helped her headache at that time.)           DIAGNOSIS  Final diagnoses:   Other migraine without status migrainosus, not intractable   Poorly controlled diabetes mellitus (CMS/HCC)       DISPOSITION  DISCHARGE    Patient discharged in stable condition.    Reviewed implications of results, diagnosis, meds, responsibility to follow up, warning signs and symptoms of possible worsening, potential complications and reasons to return to ER.    Patient/Family voiced understanding of above instructions.    Discussed plan for discharge, as there is no emergent indication for admission. Patient referred to primary care provider for BP management due to today's BP. Pt/family is agreeable and understands need for follow up and repeat testing.  Pt is aware that discharge does not mean that nothing is wrong but it indicates no emergency is present that requires admission and they must continue care with  follow-up as given below or physician of their choice.     FOLLOW-UP  Blanca Pitts MD  [address] [address] [phone]    Schedule an appointment as soon as possible for a visit       [facility]  [address] [facility] [address] [phone]    As needed, If symptoms worsen         Medication List      No changes were made to your prescriptions during this visit.           Latest Documented Vital Signs:  As of 7:53 PM  BP- 142/71 HR- 100 Temp- 98.8 °F (37.1 °C) (Oral) O2 sat- 100%    --  Documentation assistance provided by tori Green for Dr Rosa.  Information recorded by the scribe was done at my direction and has been verified and validated by me.              Rachel Green  11/19/18 1953       Daryn Rosa MD  11/19/18 6734

## 2018-11-19 NOTE — ED NOTES
Per pt she has been having headaches since she broke her left shoulder last Thursday. Pt reports she has had N&V everyday due to pain. Pt denies any neck stiffness or abdominal pain. Pt currently has eyes covered due to light sensivity. Pt is resting in bed, comfort measures have been introduced with lights dimmed, TV off, and warm blanket.      Bhavana Haq, RN  11/19/18 0894

## 2018-11-30 ENCOUNTER — RESULTS ENCOUNTER (OUTPATIENT)
Dept: ONCOLOGY | Facility: CLINIC | Age: 58
End: 2018-11-30

## 2018-11-30 DIAGNOSIS — D61.818 PANCYTOPENIA (HCC): ICD-10-CM

## 2018-11-30 DIAGNOSIS — K74.60 CIRRHOSIS OF LIVER WITHOUT ASCITES, UNSPECIFIED HEPATIC CIRRHOSIS TYPE (HCC): ICD-10-CM

## 2018-11-30 DIAGNOSIS — D73.1 HYPERSPLENISM: ICD-10-CM

## 2018-11-30 DIAGNOSIS — D69.3 ACUTE ITP (HCC): ICD-10-CM

## 2018-12-03 ENCOUNTER — OFFICE VISIT (OUTPATIENT)
Dept: GASTROENTEROLOGY | Facility: CLINIC | Age: 58
End: 2018-12-03

## 2018-12-03 ENCOUNTER — CLINICAL SUPPORT (OUTPATIENT)
Dept: ONCOLOGY | Facility: HOSPITAL | Age: 58
End: 2018-12-03

## 2018-12-03 ENCOUNTER — LAB (OUTPATIENT)
Dept: LAB | Facility: HOSPITAL | Age: 58
End: 2018-12-03

## 2018-12-03 VITALS
DIASTOLIC BLOOD PRESSURE: 73 MMHG | HEIGHT: 66 IN | SYSTOLIC BLOOD PRESSURE: 122 MMHG | BODY MASS INDEX: 30.53 KG/M2 | WEIGHT: 190 LBS | HEART RATE: 103 BPM

## 2018-12-03 DIAGNOSIS — K76.82 HEPATIC ENCEPHALOPATHY (HCC): ICD-10-CM

## 2018-12-03 DIAGNOSIS — D61.818 PANCYTOPENIA (HCC): ICD-10-CM

## 2018-12-03 DIAGNOSIS — K74.60 CIRRHOSIS OF LIVER WITH ASCITES, UNSPECIFIED HEPATIC CIRRHOSIS TYPE (HCC): Primary | ICD-10-CM

## 2018-12-03 DIAGNOSIS — K75.81 NASH (NONALCOHOLIC STEATOHEPATITIS): ICD-10-CM

## 2018-12-03 DIAGNOSIS — K76.6 PORTAL HYPERTENSION (HCC): ICD-10-CM

## 2018-12-03 DIAGNOSIS — K74.60 CIRRHOSIS OF LIVER WITHOUT ASCITES, UNSPECIFIED HEPATIC CIRRHOSIS TYPE (HCC): ICD-10-CM

## 2018-12-03 DIAGNOSIS — D69.3 ACUTE ITP (HCC): ICD-10-CM

## 2018-12-03 DIAGNOSIS — R18.8 CIRRHOSIS OF LIVER WITH ASCITES, UNSPECIFIED HEPATIC CIRRHOSIS TYPE (HCC): Primary | ICD-10-CM

## 2018-12-03 DIAGNOSIS — IMO0002 TYPE 2 DIABETES MELLITUS, UNCONTROLLED, WITH NEUROPATHY: ICD-10-CM

## 2018-12-03 DIAGNOSIS — R11.2 INTRACTABLE VOMITING WITH NAUSEA, UNSPECIFIED VOMITING TYPE: ICD-10-CM

## 2018-12-03 DIAGNOSIS — D73.1 HYPERSPLENISM: ICD-10-CM

## 2018-12-03 LAB
ALBUMIN SERPL-MCNC: 3.4 G/DL (ref 3.5–5.2)
ALBUMIN/GLOB SERPL: 1.1 G/DL (ref 1.1–2.4)
ALP SERPL-CCNC: 311 U/L (ref 38–116)
ALT SERPL W P-5'-P-CCNC: 45 U/L (ref 0–33)
ANION GAP SERPL CALCULATED.3IONS-SCNC: 13.3 MMOL/L
AST SERPL-CCNC: 46 U/L (ref 0–32)
BASOPHILS # BLD AUTO: 0.02 10*3/MM3 (ref 0–0.1)
BASOPHILS NFR BLD AUTO: 0.7 % (ref 0–1.1)
BILIRUB SERPL-MCNC: 1 MG/DL (ref 0.1–1.2)
BUN BLD-MCNC: 6 MG/DL (ref 6–20)
BUN/CREAT SERPL: 8.7 (ref 7.3–30)
CALCIUM SPEC-SCNC: 9.1 MG/DL (ref 8.5–10.2)
CHLORIDE SERPL-SCNC: 101 MMOL/L (ref 98–107)
CO2 SERPL-SCNC: 24.7 MMOL/L (ref 22–29)
CREAT BLD-MCNC: 0.69 MG/DL (ref 0.6–1.1)
DEPRECATED RDW RBC AUTO: 53.8 FL (ref 37–49)
EOSINOPHIL # BLD AUTO: 0.08 10*3/MM3 (ref 0–0.36)
EOSINOPHIL NFR BLD AUTO: 2.9 % (ref 1–5)
ERYTHROCYTE [DISTWIDTH] IN BLOOD BY AUTOMATED COUNT: 17 % (ref 11.7–14.5)
GFR SERPL CREATININE-BSD FRML MDRD: 88 ML/MIN/1.73
GLOBULIN UR ELPH-MCNC: 3 GM/DL (ref 1.8–3.5)
GLUCOSE BLD-MCNC: 170 MG/DL (ref 74–124)
HCT VFR BLD AUTO: 33.6 % (ref 34–45)
HGB BLD-MCNC: 10.3 G/DL (ref 11.5–14.9)
IMM GRANULOCYTES # BLD: 0.01 10*3/MM3 (ref 0–0.03)
IMM GRANULOCYTES NFR BLD: 0.4 % (ref 0–0.5)
LYMPHOCYTES # BLD AUTO: 0.39 10*3/MM3 (ref 1–3.5)
LYMPHOCYTES NFR BLD AUTO: 14 % (ref 20–49)
MCH RBC QN AUTO: 26.5 PG (ref 27–33)
MCHC RBC AUTO-ENTMCNC: 30.7 G/DL (ref 32–35)
MCV RBC AUTO: 86.4 FL (ref 83–97)
MONOCYTES # BLD AUTO: 0.42 10*3/MM3 (ref 0.25–0.8)
MONOCYTES NFR BLD AUTO: 15.1 % (ref 4–12)
NEUTROPHILS # BLD AUTO: 1.87 10*3/MM3 (ref 1.5–7)
NEUTROPHILS NFR BLD AUTO: 66.9 % (ref 39–75)
NRBC BLD MANUAL-RTO: 0 /100 WBC (ref 0–0)
PLATELET # BLD AUTO: 97 10*3/MM3 (ref 150–375)
PMV BLD AUTO: 10.2 FL (ref 8.9–12.1)
POTASSIUM BLD-SCNC: 3.9 MMOL/L (ref 3.5–4.7)
PROT SERPL-MCNC: 6.4 G/DL (ref 6.3–8)
RBC # BLD AUTO: 3.89 10*6/MM3 (ref 3.9–5)
SODIUM BLD-SCNC: 139 MMOL/L (ref 134–145)
WBC NRBC COR # BLD: 2.79 10*3/MM3 (ref 4–10)

## 2018-12-03 PROCEDURE — 85025 COMPLETE CBC W/AUTO DIFF WBC: CPT | Performed by: INTERNAL MEDICINE

## 2018-12-03 PROCEDURE — 99213 OFFICE O/P EST LOW 20 MIN: CPT | Performed by: INTERNAL MEDICINE

## 2018-12-03 PROCEDURE — 36415 COLL VENOUS BLD VENIPUNCTURE: CPT | Performed by: INTERNAL MEDICINE

## 2018-12-03 PROCEDURE — 80053 COMPREHEN METABOLIC PANEL: CPT | Performed by: INTERNAL MEDICINE

## 2018-12-03 RX ORDER — FUROSEMIDE 40 MG/1
40 TABLET ORAL DAILY
Qty: 30 TABLET | Refills: 2 | Status: SHIPPED | OUTPATIENT
Start: 2018-12-03 | End: 2019-06-10

## 2018-12-03 RX ORDER — SUCRALFATE 1 G/1
TABLET ORAL
Qty: 60 TABLET | Refills: 3 | Status: SHIPPED | OUTPATIENT
Start: 2018-12-03 | End: 2018-12-13

## 2018-12-03 NOTE — PROGRESS NOTES
CBC reviewed with pt. Counts stable today, plts 97K. Pt has been in and out of hospitals for the last few months. She broke her arm, had elevated blood sugars and ammonia levels while in [place]. She is also having issues with migraines. She is seeing Dr. Tobin for this Thursday. She is not taking prednisone currently. She was on 10mg daily. Labs reviewed with Dr. Mcdowell. Per Dr. Mcdowell, no need to have pt continue on prednisone. He will see her next month. Informed pt and se v/u.     Lab Results   Component Value Date    WBC 2.79 (L) 12/03/2018    HGB 10.3 (L) 12/03/2018    HCT 33.6 (L) 12/03/2018    MCV 86.4 12/03/2018    PLT 97 (L) 12/03/2018

## 2018-12-03 NOTE — PROGRESS NOTES
Subjective   Silvia Zabala is a 58 y.o.. female is here today for follow-up.    Chief Complaint   Patient presents with   • Cirrhosis   • Nausea   • Vomiting   • Edema     History of Present Illness  Patient has had ago other the last several weeks.  She fell and fractured her left arm.  Problems with severe hyperglycemia and migraine headaches.  However, right now everything seems to be in his status stability.  Her nerves are stable.  Her nausea is not impressive.  He is not having any new abdominal pain, basically her baseline discomfort that she's had for a long time.  Recent MRI of the liver did not demonstrate any evidence of biliary tract disease (patient's elevated alkaline phosphatase has been duly noted, perhaps it is due to bone).    The following portions of the patient's history were reviewed and updated as appropriate: allergies, current medications, past family history, past medical history, past social history, past surgical history and problem list.      Current Outpatient Medications:   •  ALPRAZolam (XANAX) 0.5 MG tablet, Take 1 tablet by mouth 2 (Two) Times a Day As Needed for Anxiety., Disp: 60 tablet, Rfl: 1  •  amitriptyline (ELAVIL) 50 MG tablet, Take 1 tablet by mouth Every Night., Disp: 30 tablet, Rfl: 0  •  B-D UF III MINI PEN NEEDLES 31G X 5 MM misc, USE TO INJECT 5 TO 6 TIMES PER DAY, Disp: 200 each, Rfl: 2  •  cyclobenzaprine (FLEXERIL) 5 MG tablet, One at bedtime, Disp: 30 tablet, Rfl: 1  •  DULoxetine (CYMBALTA) 30 MG capsule, TAKE THREE CAPSULES BY MOUTH DAILY, Disp: 90 capsule, Rfl: 3  •  folic acid (FOLVITE) 1 MG tablet, TAKE ONE TABLET BY MOUTH DAILY, Disp: 30 tablet, Rfl: 3  •  hydrOXYzine (ATARAX) 25 MG tablet, Take 50 mg by mouth 3 (Three) Times a Day As Needed for Itching., Disp: , Rfl:   •  Insulin Regular Human, Conc, (HUMULIN R U-500 KWIKPEN) 500 UNIT/ML solution pen-injector CONCENTRATED injection, 65 units with each meal, 30 units with snack and 3 units for every 50  above 150., Disp: 16 pen, Rfl: 11  •  levETIRAcetam (KEPPRA) 500 MG tablet, TAKE ONE TABLET BY MOUTH TWICE A DAY, Disp: 60 tablet, Rfl: 0  •  LYRICA 75 MG capsule, TAKE ONE CAPSULE BY MOUTH TWICE A DAY, Disp: 60 capsule, Rfl: 0  •  Multiple Vitamin (MULTI-VITAMIN PO), Take 1 tablet by mouth Daily., Disp: , Rfl:   •  OxyCODONE HCl (OXAYDO) 7.5 MG tablet , Take 7.5 mg by mouth Every 8 (Eight) Hours As Needed., Disp: , Rfl:   •  pantoprazole (PROTONIX) 40 MG EC tablet, TAKE ONE TABLET BY MOUTH DAILY, Disp: 30 tablet, Rfl: 3  •  promethazine (PHENERGAN) 25 MG tablet, TAKE ONE TABLET BY MOUTH EVERY 6 HOURS AS NEEDED FOR NAUSEA OR VOMITING, Disp: 30 tablet, Rfl: 0  •  spironolactone (ALDACTONE) 100 MG tablet, TAKE ONE TABLET BY MOUTH DAILY, Disp: 30 tablet, Rfl: 3  •  sucralfate (CARAFATE) 1 g tablet, TAKE ONE TABLET BY MOUTH TWICE A DAY, Disp: 60 tablet, Rfl: 3  •  trimethobenzamide (TIGAN) 300 MG capsule, Take 300 mg by mouth 3 (Three) Times a Day., Disp: , Rfl:   •  vitamin B-12 (CYANOCOBALAMIN) 1000 MCG tablet, Take 1,000 mcg by mouth daily., Disp: , Rfl:   •  XIFAXAN 550 MG tablet, TAKE ONE TABLET BY MOUTH TWICE A DAY, Disp: 60 tablet, Rfl: 3  •  furosemide (LASIX) 40 MG tablet, Take 1 tablet by mouth Daily., Disp: 30 tablet, Rfl: 2    Family History   Problem Relation Age of Onset   • Liver disease Other    • Depression Other    • Heart disease Other    • Hypertension Other    • Diabetes Other    • Breast cancer Other    • Brain cancer Other    • Uterine cancer Other    • Colon cancer Other    • Leukemia Other    • Colon cancer Maternal Aunt    • Hypertension Mother    • Diabetes type II Mother    • Rheum arthritis Mother    • Liver disease Mother         DUKES   • Heart disease Father    • Diabetes type II Father    • Diabetes type II Sister    • Lupus Sister    • Malig Hyperthermia Neg Hx        Review of Systems   Constitutional: Positive for fatigue.   Respiratory: Negative for cough.    Cardiovascular: Positive  for leg swelling.   Gastrointestinal: Positive for nausea. Negative for abdominal distention.       Objective   Physical Exam   Constitutional:   She has mild tardive dyskinesia.  She has no asterixis today.  She has 2+ pitting edema of the ankles.       Pertinent laboratory results were reviewed. , Pertinent old records were reviewed.  and Pertinent radiology results were reviewed.     Assessment/Plan   Problems Addressed this Visit        Cardiovascular and Mediastinum    Portal hypertension (CMS/HCC)    Relevant Orders    Comprehensive Metabolic Panel       Digestive    DUKES (nonalcoholic steatohepatitis)    Relevant Orders    Comprehensive Metabolic Panel    Cirrhosis of liver with ascites (CMS/HCC) - Primary    Relevant Orders    Comprehensive Metabolic Panel    Intractable vomiting with nausea    Relevant Orders    Comprehensive Metabolic Panel       Endocrine    Type 2 diabetes mellitus, uncontrolled, with neuropathy (CMS/HCC)    Relevant Orders    Comprehensive Metabolic Panel       Nervous and Auditory    Hepatic encephalopathy (CMS/HCC)    Relevant Orders    Comprehensive Metabolic Panel       Immune and Lymphatic    Pancytopenia (CMS/HCC)    Relevant Orders    Comprehensive Metabolic Panel        She has numerous medical issues for sure but she seems to be holding her own with them.  I would like to restart Lasix 40 mg daily to address her ankle edema.  We will check a CMP in 2 weeks.  Otherwise I like for her to continue current therapy and return in about 2 months.

## 2018-12-11 ENCOUNTER — OFFICE VISIT (OUTPATIENT)
Dept: ENDOCRINOLOGY | Age: 58
End: 2018-12-11

## 2018-12-11 VITALS
HEIGHT: 66 IN | SYSTOLIC BLOOD PRESSURE: 142 MMHG | DIASTOLIC BLOOD PRESSURE: 80 MMHG | BODY MASS INDEX: 29.89 KG/M2 | OXYGEN SATURATION: 99 % | HEART RATE: 106 BPM | WEIGHT: 186 LBS

## 2018-12-11 DIAGNOSIS — K31.84 GASTROPARESIS: ICD-10-CM

## 2018-12-11 DIAGNOSIS — IMO0002 TYPE 2 DIABETES MELLITUS, UNCONTROLLED, WITH NEUROPATHY: Primary | ICD-10-CM

## 2018-12-11 DIAGNOSIS — E11.42 DIABETIC PERIPHERAL NEUROPATHY (HCC): ICD-10-CM

## 2018-12-11 DIAGNOSIS — K75.81 NASH (NONALCOHOLIC STEATOHEPATITIS): ICD-10-CM

## 2018-12-11 PROCEDURE — 99215 OFFICE O/P EST HI 40 MIN: CPT | Performed by: INTERNAL MEDICINE

## 2018-12-11 RX ORDER — FLASH GLUCOSE SENSOR
1 KIT MISCELLANEOUS
Qty: 2 EACH | Refills: 3 | Status: SHIPPED | OUTPATIENT
Start: 2018-12-11 | End: 2019-01-23 | Stop reason: SDUPTHER

## 2018-12-11 NOTE — PROGRESS NOTES
58 y.o.    Patient Care Team:  Blanca Pitts MD as PCP - General (Internal Medicine)  Blanca Pitts MD as PCP - Claims Attributed  Sukhdeep Mcdowell MD as Consulting Physician (Hematology and Oncology)  Blanca Pitts MD as Referring Physician (Internal Medicine)  Rich Coleman MD as Consulting Physician (Gastroenterology)  Merary Burnett MD as Consulting Physician (Endocrinology)    Chief Complaint:    FOLLOW UP APPOINTMENT/ TYPE 2 DIABETES MELLITUS  Subjective     HPI    58-year-old white female with a complicated past medical history doing very poorly in terms of her severely uncontrolled type 2 diabetes mellitus, gastroparesis and ITP.    Type 2 diabetes mellitus  Diagnosed in her 20s.  Has been on insulin since then.  In April 2018 patient has been started on U-500 insulin due to her severely elevated blood sugars.  Due to her gastroparesis patient's oral intake is extremely variable and as a result she takes her insulin when she eats and does not take her insulin when she is not which further complicates her blood sugar control.  Today in the clinic she reports she is on U-500 insulin 65 units with each meal, 35 units with snack, sliding scale-5 units for every 50 about 1 50 mg/dL range.  She recently went to [place] supposed to stay only for a week but the stay was complicated with her feeling sick due to gastroparesis flareup, sustained a fall, when she slipped and fell in dark. She broke her collarbone and the tip of her humerus.  Which is currently in a sling and per pt cannot operated.   She does not check her blood sugars when she is not eating.  As a result and subtracting maybe 1-2 times a day.  Average blood sugars at around 300s range.  Lowest blood sugar was 100 mg/dL range.  Last eye examination was in January 2018 and no history of diabetic retinopathy.  Does have history of diabetic neuropathy and is on Lyrica 75 mg twice daily.  Diet is extremely limited due to her history of  gastroparesis and also the fact that she cannot have her dentures in.     Tried DEXCOM - cgm for the pt which could monitor her BG trends and as a result we could be aggressive with her BG control but that was covered by her insurance.     Pancreatic divisum : No hx of pancreatitis per pt.       Liver disease/DUKES -  Sees Dr. Jordan from [facility].       Gastroparesis - Sees Dr. Coleman. Getting worse.  Not a candidate for stimulator due to ITP      ITP - diagnosed in May 2018. Currently stable. Seen by Hematology and oncology.        Reviewed primary care physician's/consulting physician documentation and lab results :     Interval History      The following portions of the patient's history were reviewed and updated as appropriate: allergies, current medications, past family history, past medical history, past social history, past surgical history and problem list.    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   • Cirrhosis (CMS/HCC)    • Colon polyps    • Deafness    • Depression    • Diabetes mellitus (CMS/HCC)     Adult onset, insulin requiring   • Duodenal ulcer with hemorrhage    • Esophageal varices determined by endoscopy (CMS/HCC)    • Fibromyalgia    • Gallbladder disease    • Gastric varices    • Gastroparesis    • Glaucoma    • Granuloma annulare    • H/O B12 deficiency    • H/O Bleeding ulcer     And gastroesophageal varices   • H/O mixed connective tissue disorder    • Hemorrhoids    • Hiatal hernia    • History of transfusion    • Hx of being hospitalized     In [place] for GI bleeding from ulcer and varices   • Hyperlipidemia    • Migraine    • DUKES (nonalcoholic steatohepatitis) 11/2016   • BRICE (obstructive sleep apnea)    • Pancreas divisum    • Pancytopenia (CMS/HCC)     Chronic,  due to cirrhosis and hypersplenism   • Peptic ulcer disease     And esophageal varices   • PONV (postoperative nausea and vomiting)    • RA (rheumatoid arthritis) (CMS/HCC)    • Seizure disorder (CMS/HCC)     LAST ONE SEVERAL YEARS AGO   • Seizures (CMS/HCC)    • Systemic lupus (CMS/HCC)      Family History   Problem Relation Age of Onset   • Liver disease Other    • Depression Other    • Heart disease Other    • Hypertension Other    • Diabetes Other    • Breast cancer Other    • Brain cancer Other    • Uterine cancer Other    • Colon cancer Other    • Leukemia Other    • Colon cancer Maternal Aunt    • Hypertension Mother    • Diabetes type II Mother    • Rheum arthritis Mother    • Liver disease Mother         DUKES   • Heart disease Father    • Diabetes type II Father    • Diabetes type II Sister    • Lupus Sister    • Malig Hyperthermia Neg Hx      Social History     Socioeconomic History   • Marital status:      Spouse name: Jose   • Number of children: Not on file   • Years of education: Not on file   • Highest education level: Not on file   Social Needs   • Financial resource strain: Not on file   • Food insecurity - worry: Not on file   • Food insecurity - inability: Not on file   • Transportation needs - medical: Not on file   • Transportation needs - non-medical: Not on file   Occupational History     Employer: DISABLED   Tobacco Use   • Smoking status: Former Smoker     Packs/day: 0.00     Years: 0.00     Pack years: 0.00     Last attempt to quit: 2015     Years since quittin.9   • Smokeless tobacco: Never Used   Substance and Sexual Activity   • Alcohol use: No   • Drug use: No   • Sexual activity: Defer   Other Topics Concern   • Not on file   Social History Narrative    Moved to [place] from [place]. She is currently medically disabled.     Allergies   Allergen Reactions   • Albuterol Anaphylaxis   • Tramadol Nausea Only, Other (See Comments) and GI Intolerance     Per pt causes  "her \"palsy, meaning shaking and tremors\"    • Lactulose Nausea And Vomiting and Other (See Comments)     Severe abdominal pain   • Quinine Derivatives Nausea And Vomiting       Current Outpatient Medications:   •  ALPRAZolam (XANAX) 0.5 MG tablet, Take 1 tablet by mouth 2 (Two) Times a Day As Needed for Anxiety., Disp: 60 tablet, Rfl: 1  •  amitriptyline (ELAVIL) 50 MG tablet, Take 1 tablet by mouth Every Night., Disp: 30 tablet, Rfl: 0  •  B-D UF III MINI PEN NEEDLES 31G X 5 MM misc, USE TO INJECT 5 TO 6 TIMES PER DAY, Disp: 200 each, Rfl: 2  •  cyclobenzaprine (FLEXERIL) 5 MG tablet, One at bedtime, Disp: 30 tablet, Rfl: 1  •  DULoxetine (CYMBALTA) 30 MG capsule, TAKE THREE CAPSULES BY MOUTH DAILY, Disp: 90 capsule, Rfl: 3  •  folic acid (FOLVITE) 1 MG tablet, TAKE ONE TABLET BY MOUTH DAILY, Disp: 30 tablet, Rfl: 3  •  furosemide (LASIX) 40 MG tablet, Take 1 tablet by mouth Daily., Disp: 30 tablet, Rfl: 2  •  hydrOXYzine (ATARAX) 25 MG tablet, Take 50 mg by mouth 3 (Three) Times a Day As Needed for Itching., Disp: , Rfl:   •  Insulin Regular Human, Conc, (HUMULIN R U-500 KWIKPEN) 500 UNIT/ML solution pen-injector CONCENTRATED injection, 65 units with each meal, 30 units with snack and 3 units for every 50 above 150., Disp: 16 pen, Rfl: 11  •  levETIRAcetam (KEPPRA) 500 MG tablet, TAKE ONE TABLET BY MOUTH TWICE A DAY, Disp: 60 tablet, Rfl: 0  •  LYRICA 75 MG capsule, TAKE ONE CAPSULE BY MOUTH TWICE A DAY, Disp: 60 capsule, Rfl: 0  •  Multiple Vitamin (MULTI-VITAMIN PO), Take 1 tablet by mouth Daily., Disp: , Rfl:   •  OxyCODONE HCl (OXAYDO) 7.5 MG tablet , Take 7.5 mg by mouth Every 8 (Eight) Hours As Needed., Disp: , Rfl:   •  pantoprazole (PROTONIX) 40 MG EC tablet, TAKE ONE TABLET BY MOUTH DAILY, Disp: 30 tablet, Rfl: 3  •  promethazine (PHENERGAN) 25 MG tablet, TAKE ONE TABLET BY MOUTH EVERY 6 HOURS AS NEEDED FOR NAUSEA OR VOMITING, Disp: 30 tablet, Rfl: 0  •  spironolactone (ALDACTONE) 100 MG tablet, TAKE ONE " "TABLET BY MOUTH DAILY, Disp: 30 tablet, Rfl: 3  •  sucralfate (CARAFATE) 1 g tablet, TAKE ONE TABLET BY MOUTH TWICE A DAY, Disp: 60 tablet, Rfl: 3  •  trimethobenzamide (TIGAN) 300 MG capsule, Take 300 mg by mouth 3 (Three) Times a Day., Disp: , Rfl:   •  vitamin B-12 (CYANOCOBALAMIN) 1000 MCG tablet, Take 1,000 mcg by mouth daily., Disp: , Rfl:   •  XIFAXAN 550 MG tablet, TAKE ONE TABLET BY MOUTH TWICE A DAY, Disp: 60 tablet, Rfl: 3  •  Continuous Blood Gluc Sensor (FREESTYLE YESIKA SENSOR SYSTEM), 1 Device Every 14 (Fourteen) Days., Disp: 2 each, Rfl: 3        Review of Systems   Constitutional: Positive for appetite change and fatigue. Negative for fever.   Eyes: Negative for visual disturbance.   Respiratory: Positive for shortness of breath.    Cardiovascular: Positive for palpitations and leg swelling.   Gastrointestinal: Positive for abdominal pain, nausea and vomiting.   Endocrine: Positive for polydipsia. Negative for polyuria.   Musculoskeletal: Positive for arthralgias and joint swelling. Negative for neck pain.   Skin: Negative for rash.   Neurological: Positive for weakness and numbness.   Psychiatric/Behavioral: Negative for behavioral problems.       Objective       Vitals:    12/11/18 1418   BP: 142/80   Pulse: 106   SpO2: 99%   Weight: 84.4 kg (186 lb)   Height: 167.6 cm (66\")     Body mass index is 30.02 kg/m².      Physical Exam   Constitutional: She is oriented to person, place, and time. She appears well-nourished.   Obese     HENT:   Head: Normocephalic and atraumatic.   No teeth, wide neck   Eyes: Conjunctivae and EOM are normal. No scleral icterus.   Neck: Normal range of motion. Neck supple. No thyromegaly present.   Acanthosis nigricans   Cardiovascular: Normal rate and normal heart sounds.   Pulmonary/Chest: Effort normal and breath sounds normal. No stridor. She has no wheezes.   Abdominal: Soft. Bowel sounds are normal. She exhibits no distension. There is no tenderness.   Central obesity "   Musculoskeletal: She exhibits edema. She exhibits no tenderness.   Neurological: She is alert and oriented to person, place, and time.   Skin: Skin is warm and dry. She is not diaphoretic.   Psychiatric: She has a normal mood and affect.   Vitals reviewed.    Results Review:     I reviewed the patient's new clinical results and mentioned them above in HPI and in plan as well.    Medical records reviewed  Summary: done      Lab on 12/03/2018   Component Date Value Ref Range Status   • Glucose 12/03/2018 170* 74 - 124 mg/dL Final   • BUN 12/03/2018 6  6 - 20 mg/dL Final   • Creatinine 12/03/2018 0.69  0.60 - 1.10 mg/dL Final   • Sodium 12/03/2018 139  134 - 145 mmol/L Final   • Potassium 12/03/2018 3.9  3.5 - 4.7 mmol/L Final   • Chloride 12/03/2018 101  98 - 107 mmol/L Final   • CO2 12/03/2018 24.7  22.0 - 29.0 mmol/L Final   • Calcium 12/03/2018 9.1  8.5 - 10.2 mg/dL Final   • Total Protein 12/03/2018 6.4  6.3 - 8.0 g/dL Final   • Albumin 12/03/2018 3.40* 3.50 - 5.20 g/dL Final   • ALT (SGPT) 12/03/2018 45* 0 - 33 U/L Final   • AST (SGOT) 12/03/2018 46* 0 - 32 U/L Final   • Alkaline Phosphatase 12/03/2018 311* 38 - 116 U/L Final   • Total Bilirubin 12/03/2018 1.0  0.1 - 1.2 mg/dL Final   • eGFR Non African Amer 12/03/2018 88  >60 mL/min/1.73 Final   • Globulin 12/03/2018 3.0  1.8 - 3.5 gm/dL Final   • A/G Ratio 12/03/2018 1.1  1.1 - 2.4 g/dL Final   • BUN/Creatinine Ratio 12/03/2018 8.7  7.3 - 30.0 Final   • Anion Gap 12/03/2018 13.3  mmol/L Final   • WBC 12/03/2018 2.79* 4.00 - 10.00 10*3/mm3 Final   • RBC 12/03/2018 3.89* 3.90 - 5.00 10*6/mm3 Final   • Hemoglobin 12/03/2018 10.3* 11.5 - 14.9 g/dL Final   • Hematocrit 12/03/2018 33.6* 34.0 - 45.0 % Final   • MCV 12/03/2018 86.4  83.0 - 97.0 fL Final   • MCH 12/03/2018 26.5* 27.0 - 33.0 pg Final   • MCHC 12/03/2018 30.7* 32.0 - 35.0 g/dL Final   • RDW 12/03/2018 17.0* 11.7 - 14.5 % Final   • RDW-SD 12/03/2018 53.8* 37.0 - 49.0 fl Final   • MPV 12/03/2018 10.2  8.9  - 12.1 fL Final   • Platelets 12/03/2018 97* 150 - 375 10*3/mm3 Final   • Neutrophil % 12/03/2018 66.9  39.0 - 75.0 % Final   • Lymphocyte % 12/03/2018 14.0* 20.0 - 49.0 % Final   • Monocyte % 12/03/2018 15.1* 4.0 - 12.0 % Final   • Eosinophil % 12/03/2018 2.9  1.0 - 5.0 % Final   • Basophil % 12/03/2018 0.7  0.0 - 1.1 % Final   • Immature Grans % 12/03/2018 0.4  0.0 - 0.5 % Final   • Neutrophils, Absolute 12/03/2018 1.87  1.50 - 7.00 10*3/mm3 Final   • Lymphocytes, Absolute 12/03/2018 0.39* 1.00 - 3.50 10*3/mm3 Final   • Monocytes, Absolute 12/03/2018 0.42  0.25 - 0.80 10*3/mm3 Final   • Eosinophils, Absolute 12/03/2018 0.08  0.00 - 0.36 10*3/mm3 Final   • Basophils, Absolute 12/03/2018 0.02  0.00 - 0.10 10*3/mm3 Final   • Immature Grans, Absolute 12/03/2018 0.01  0.00 - 0.03 10*3/mm3 Final   • nRBC 12/03/2018 0.0  0.0 - 0.0 /100 WBC Final     Lab Results   Component Value Date    HGBA1C 8.60 (H) 10/03/2018    HGBA1C 8.50 (H) 07/09/2018    HGBA1C 8.39 (H) 06/08/2018     Lab Results   Component Value Date    MICROALBUR <1.2 12/17/2017    CREATININE 0.69 12/03/2018     Imaging Results (most recent)     None                Assessment and Plan:    Diagnoses and all orders for this visit:    Type 2 diabetes mellitus, uncontrolled, with neuropathy (CMS/[facility])  -     Hemoglobin A1c  -     Basic Metabolic Panel; Future  -     Hemoglobin A1c; Future  -     TSH; Future  -     Lipid Panel; Future    Diabetic peripheral neuropathy (CMS/[facility])  -     Hemoglobin A1c  -     Basic Metabolic Panel; Future  -     Hemoglobin A1c; Future  -     TSH; Future  -     Lipid Panel; Future    Gastroparesis  -     Hemoglobin A1c  -     Basic Metabolic Panel; Future  -     Hemoglobin A1c; Future  -     TSH; Future  -     Lipid Panel; Future    DUKES (nonalcoholic steatohepatitis)  -     Hemoglobin A1c  -     Basic Metabolic Panel; Future  -     Hemoglobin A1c; Future  -     TSH; Future  -     Lipid Panel; Future    Other orders  -     Continuous  "Blood Gluc Sensor (FREESTYLE YESIKA SENSOR SYSTEM); 1 Device Every 14 (Fourteen) Days.        Type 2 diabetes mellitus-significantly uncontrolled  Continuing U - 500 65 units with each meal  Continue U - 500 35 units with each snack  Continue sliding scale 3 times a day and at bedtime  Placed continues glucose monitoring on the patient which is a sample device that we have here in the office  Pt now only needs to get the sensors once a month. This way we are assured with her BG and we can go up on her insulin If needed.     Increase in patient's insulin dosage is extremely challenging given her poor oral intake.    Gastroparesis  Complicating the control of her diabetes.      Reviewed Lab results with the patient.     22min   ( greater than 50% of the time) out of  40 minutes face-to-face spent counseling the patient on benefits of CGM and keeping her bg levels under control to help her Gastroparesis.               Merary Burnett MD  12/11/18    [facility] Dragon / transcription disclaimer:     \"Dictated utilizing Dragon dictation\".  "

## 2018-12-12 LAB — HBA1C MFR BLD: 9.5 % (ref 4.8–5.6)

## 2018-12-13 ENCOUNTER — APPOINTMENT (OUTPATIENT)
Dept: GENERAL RADIOLOGY | Facility: HOSPITAL | Age: 58
End: 2018-12-13

## 2018-12-13 ENCOUNTER — HOSPITAL ENCOUNTER (OUTPATIENT)
Facility: HOSPITAL | Age: 58
Setting detail: OBSERVATION
Discharge: HOME | End: 2018-12-18
Attending: EMERGENCY MEDICINE | Admitting: NURSE PRACTITIONER

## 2018-12-13 DIAGNOSIS — F32.A DEPRESSION, UNSPECIFIED DEPRESSION TYPE: ICD-10-CM

## 2018-12-13 DIAGNOSIS — K75.81 NASH (NONALCOHOLIC STEATOHEPATITIS): ICD-10-CM

## 2018-12-13 DIAGNOSIS — Z79.4 TYPE 2 DIABETES MELLITUS WITH HYPERGLYCEMIA, WITH LONG-TERM CURRENT USE OF INSULIN (HCC): Primary | ICD-10-CM

## 2018-12-13 DIAGNOSIS — R60.1 ANASARCA: ICD-10-CM

## 2018-12-13 DIAGNOSIS — S42.222A CLOSED 2-PART DISPLACED FRACTURE OF SURGICAL NECK OF LEFT HUMERUS, INITIAL ENCOUNTER: ICD-10-CM

## 2018-12-13 DIAGNOSIS — E11.65 TYPE 2 DIABETES MELLITUS WITH HYPERGLYCEMIA, WITH LONG-TERM CURRENT USE OF INSULIN (HCC): Primary | ICD-10-CM

## 2018-12-13 PROBLEM — D64.9 ANEMIA: Status: ACTIVE | Noted: 2018-12-13

## 2018-12-13 PROBLEM — E72.20 HYPERAMMONEMIA (HCC): Status: ACTIVE | Noted: 2018-12-13

## 2018-12-13 PROBLEM — G47.33 OSA (OBSTRUCTIVE SLEEP APNEA): Status: ACTIVE | Noted: 2018-12-13

## 2018-12-13 PROBLEM — K31.84 GASTROPARESIS: Status: ACTIVE | Noted: 2018-12-13

## 2018-12-13 PROBLEM — E87.1 HYPONATREMIA: Status: ACTIVE | Noted: 2018-12-13

## 2018-12-13 LAB
ALBUMIN SERPL-MCNC: 3.3 G/DL (ref 3.5–5.2)
ALBUMIN/GLOB SERPL: 1 G/DL
ALP SERPL-CCNC: 247 U/L (ref 39–117)
ALT SERPL W P-5'-P-CCNC: 31 U/L (ref 1–33)
AMMONIA BLD-SCNC: 87 UMOL/L (ref 11–51)
AMPHET+METHAMPHET UR QL: NEGATIVE
ANION GAP SERPL CALCULATED.3IONS-SCNC: 14.1 MMOL/L
AST SERPL-CCNC: 39 U/L (ref 1–32)
BARBITURATES UR QL SCN: NEGATIVE
BASOPHILS # BLD AUTO: 0.01 10*3/MM3 (ref 0–0.2)
BASOPHILS NFR BLD AUTO: 0.3 % (ref 0–1.5)
BENZODIAZ UR QL SCN: NEGATIVE
BILIRUB SERPL-MCNC: 0.8 MG/DL (ref 0.1–1.2)
BILIRUB UR QL STRIP: NEGATIVE
BUN BLD-MCNC: 11 MG/DL (ref 6–20)
BUN/CREAT SERPL: 12.6 (ref 7–25)
CALCIUM SPEC-SCNC: 8.9 MG/DL (ref 8.6–10.5)
CANNABINOIDS SERPL QL: NEGATIVE
CHLORIDE SERPL-SCNC: 95 MMOL/L (ref 98–107)
CLARITY UR: CLEAR
CO2 SERPL-SCNC: 21.9 MMOL/L (ref 22–29)
COCAINE UR QL: NEGATIVE
COLOR UR: YELLOW
CREAT BLD-MCNC: 0.87 MG/DL (ref 0.57–1)
DEPRECATED RDW RBC AUTO: 55.7 FL (ref 37–54)
EOSINOPHIL # BLD AUTO: 0.07 10*3/MM3 (ref 0–0.7)
EOSINOPHIL NFR BLD AUTO: 2.4 % (ref 0.3–6.2)
ERYTHROCYTE [DISTWIDTH] IN BLOOD BY AUTOMATED COUNT: 17 % (ref 11.7–13)
ETHANOL BLD-MCNC: <10 MG/DL (ref 0–10)
ETHANOL UR QL: <0.01 %
GFR SERPL CREATININE-BSD FRML MDRD: 67 ML/MIN/1.73
GLOBULIN UR ELPH-MCNC: 3.2 GM/DL
GLUCOSE BLD-MCNC: 608 MG/DL (ref 65–99)
GLUCOSE BLDC GLUCOMTR-MCNC: 453 MG/DL (ref 70–130)
GLUCOSE BLDC GLUCOMTR-MCNC: 499 MG/DL (ref 70–130)
GLUCOSE BLDC GLUCOMTR-MCNC: 584 MG/DL (ref 70–130)
GLUCOSE UR STRIP-MCNC: ABNORMAL MG/DL
HBA1C MFR BLD: 9.2 % (ref 4.8–5.6)
HCT VFR BLD AUTO: 34 % (ref 35.6–45.5)
HGB BLD-MCNC: 10.1 G/DL (ref 11.9–15.5)
HGB UR QL STRIP.AUTO: NEGATIVE
HOLD SPECIMEN: NORMAL
HOLD SPECIMEN: NORMAL
IMM GRANULOCYTES # BLD: 0.02 10*3/MM3 (ref 0–0.03)
IMM GRANULOCYTES NFR BLD: 0.7 % (ref 0–0.5)
KETONES UR QL STRIP: NEGATIVE
LEUKOCYTE ESTERASE UR QL STRIP.AUTO: NEGATIVE
LIPASE SERPL-CCNC: 8 U/L (ref 13–60)
LYMPHOCYTES # BLD AUTO: 0.37 10*3/MM3 (ref 0.9–4.8)
LYMPHOCYTES NFR BLD AUTO: 12.8 % (ref 19.6–45.3)
MCH RBC QN AUTO: 26.5 PG (ref 26.9–32)
MCHC RBC AUTO-ENTMCNC: 29.7 G/DL (ref 32.4–36.3)
MCV RBC AUTO: 89.2 FL (ref 80.5–98.2)
METHADONE UR QL SCN: NEGATIVE
MONOCYTES # BLD AUTO: 0.34 10*3/MM3 (ref 0.2–1.2)
MONOCYTES NFR BLD AUTO: 11.8 % (ref 5–12)
NEUTROPHILS # BLD AUTO: 2.07 10*3/MM3 (ref 1.9–8.1)
NEUTROPHILS NFR BLD AUTO: 72 % (ref 42.7–76)
NITRITE UR QL STRIP: NEGATIVE
OPIATES UR QL: NEGATIVE
OXYCODONE UR QL SCN: POSITIVE
PH UR STRIP.AUTO: 6 [PH] (ref 5–8)
PLATELET # BLD AUTO: 99 10*3/MM3 (ref 140–500)
PMV BLD AUTO: 10.3 FL (ref 6–12)
POTASSIUM BLD-SCNC: 4.8 MMOL/L (ref 3.5–5.2)
PROT SERPL-MCNC: 6.5 G/DL (ref 6–8.5)
PROT UR QL STRIP: NEGATIVE
RBC # BLD AUTO: 3.81 10*6/MM3 (ref 3.9–5.2)
SODIUM BLD-SCNC: 131 MMOL/L (ref 136–145)
SP GR UR STRIP: 1.02 (ref 1–1.03)
UROBILINOGEN UR QL STRIP: ABNORMAL
WBC NRBC COR # BLD: 2.88 10*3/MM3 (ref 4.5–10.7)
WHOLE BLOOD HOLD SPECIMEN: NORMAL
WHOLE BLOOD HOLD SPECIMEN: NORMAL

## 2018-12-13 PROCEDURE — 80307 DRUG TEST PRSMV CHEM ANLYZR: CPT | Performed by: NURSE PRACTITIONER

## 2018-12-13 PROCEDURE — 83036 HEMOGLOBIN GLYCOSYLATED A1C: CPT | Performed by: NURSE PRACTITIONER

## 2018-12-13 PROCEDURE — 71046 X-RAY EXAM CHEST 2 VIEWS: CPT

## 2018-12-13 PROCEDURE — 82962 GLUCOSE BLOOD TEST: CPT

## 2018-12-13 PROCEDURE — 73060 X-RAY EXAM OF HUMERUS: CPT

## 2018-12-13 PROCEDURE — G0378 HOSPITAL OBSERVATION PER HR: HCPCS

## 2018-12-13 PROCEDURE — 99284 EMERGENCY DEPT VISIT MOD MDM: CPT

## 2018-12-13 PROCEDURE — 96361 HYDRATE IV INFUSION ADD-ON: CPT

## 2018-12-13 PROCEDURE — 83690 ASSAY OF LIPASE: CPT | Performed by: NURSE PRACTITIONER

## 2018-12-13 PROCEDURE — 81003 URINALYSIS AUTO W/O SCOPE: CPT | Performed by: NURSE PRACTITIONER

## 2018-12-13 PROCEDURE — 82140 ASSAY OF AMMONIA: CPT | Performed by: NURSE PRACTITIONER

## 2018-12-13 PROCEDURE — 73030 X-RAY EXAM OF SHOULDER: CPT

## 2018-12-13 PROCEDURE — 80053 COMPREHEN METABOLIC PANEL: CPT | Performed by: NURSE PRACTITIONER

## 2018-12-13 PROCEDURE — 85025 COMPLETE CBC W/AUTO DIFF WBC: CPT | Performed by: NURSE PRACTITIONER

## 2018-12-13 PROCEDURE — 63710000001 INSULIN REGULAR HUMAN PER 5 UNITS: Performed by: NURSE PRACTITIONER

## 2018-12-13 PROCEDURE — 93005 ELECTROCARDIOGRAM TRACING: CPT | Performed by: NURSE PRACTITIONER

## 2018-12-13 PROCEDURE — 93010 ELECTROCARDIOGRAM REPORT: CPT | Performed by: INTERNAL MEDICINE

## 2018-12-13 RX ORDER — ONDANSETRON 4 MG/1
4 TABLET, FILM COATED ORAL EVERY 6 HOURS PRN
Status: DISCONTINUED | OUTPATIENT
Start: 2018-12-13 | End: 2018-12-18 | Stop reason: HOSPADM

## 2018-12-13 RX ORDER — MULTIPLE VITAMINS W/ MINERALS TAB 9MG-400MCG
1 TAB ORAL DAILY
Status: DISCONTINUED | OUTPATIENT
Start: 2018-12-14 | End: 2018-12-18 | Stop reason: HOSPADM

## 2018-12-13 RX ORDER — MULTIPLE VITAMINS W/ MINERALS TAB 9MG-400MCG
1 TAB ORAL DAILY
COMMUNITY

## 2018-12-13 RX ORDER — SODIUM CHLORIDE 0.9 % (FLUSH) 0.9 %
3 SYRINGE (ML) INJECTION EVERY 12 HOURS SCHEDULED
Status: DISCONTINUED | OUTPATIENT
Start: 2018-12-13 | End: 2018-12-18 | Stop reason: HOSPADM

## 2018-12-13 RX ORDER — FOLIC ACID 1 MG/1
1 TABLET ORAL DAILY
COMMUNITY
End: 2019-01-01 | Stop reason: SDUPTHER

## 2018-12-13 RX ORDER — LEVETIRACETAM 500 MG/1
500 TABLET ORAL EVERY 12 HOURS SCHEDULED
Status: DISCONTINUED | OUTPATIENT
Start: 2018-12-14 | End: 2018-12-18 | Stop reason: HOSPADM

## 2018-12-13 RX ORDER — NICOTINE POLACRILEX 4 MG
15 LOZENGE BUCCAL
Status: DISCONTINUED | OUTPATIENT
Start: 2018-12-13 | End: 2018-12-18 | Stop reason: HOSPADM

## 2018-12-13 RX ORDER — PANTOPRAZOLE SODIUM 40 MG/1
40 TABLET, DELAYED RELEASE ORAL EVERY MORNING
Status: DISCONTINUED | OUTPATIENT
Start: 2018-12-14 | End: 2018-12-18 | Stop reason: HOSPADM

## 2018-12-13 RX ORDER — PROMETHAZINE HYDROCHLORIDE 25 MG/1
25 TABLET ORAL EVERY 6 HOURS PRN
Status: ON HOLD | COMMUNITY
End: 2019-01-22 | Stop reason: SDUPTHER

## 2018-12-13 RX ORDER — ONDANSETRON 4 MG/1
4 TABLET, ORALLY DISINTEGRATING ORAL EVERY 6 HOURS PRN
Status: DISCONTINUED | OUTPATIENT
Start: 2018-12-13 | End: 2018-12-18 | Stop reason: HOSPADM

## 2018-12-13 RX ORDER — FOLIC ACID 1 MG/1
1 TABLET ORAL DAILY
Status: DISCONTINUED | OUTPATIENT
Start: 2018-12-14 | End: 2018-12-18 | Stop reason: HOSPADM

## 2018-12-13 RX ORDER — CYCLOBENZAPRINE HCL 5 MG
5 TABLET ORAL
COMMUNITY
End: 2019-04-05 | Stop reason: SDUPTHER

## 2018-12-13 RX ORDER — LEVETIRACETAM 500 MG/1
500 TABLET ORAL 2 TIMES DAILY
Status: ON HOLD | COMMUNITY
End: 2018-12-17 | Stop reason: SDUPTHER

## 2018-12-13 RX ORDER — ONDANSETRON 2 MG/ML
4 INJECTION INTRAMUSCULAR; INTRAVENOUS EVERY 6 HOURS PRN
Status: DISCONTINUED | OUTPATIENT
Start: 2018-12-13 | End: 2018-12-18 | Stop reason: HOSPADM

## 2018-12-13 RX ORDER — AMITRIPTYLINE HYDROCHLORIDE 50 MG/1
50 TABLET, FILM COATED ORAL NIGHTLY
Status: DISCONTINUED | OUTPATIENT
Start: 2018-12-14 | End: 2018-12-18 | Stop reason: HOSPADM

## 2018-12-13 RX ORDER — PREGABALIN 75 MG/1
75 CAPSULE ORAL 2 TIMES DAILY
COMMUNITY

## 2018-12-13 RX ORDER — SUCRALFATE 1 G/1
1 TABLET ORAL 2 TIMES DAILY
COMMUNITY
End: 2018-12-18 | Stop reason: HOSPADM

## 2018-12-13 RX ORDER — PANTOPRAZOLE SODIUM 40 MG/1
40 TABLET, DELAYED RELEASE ORAL DAILY
COMMUNITY
End: 2019-02-03 | Stop reason: SDUPTHER

## 2018-12-13 RX ORDER — PROMETHAZINE HYDROCHLORIDE 25 MG/1
25 TABLET ORAL EVERY 6 HOURS PRN
Status: DISCONTINUED | OUTPATIENT
Start: 2018-12-13 | End: 2018-12-18 | Stop reason: HOSPADM

## 2018-12-13 RX ORDER — SODIUM CHLORIDE 0.9 % (FLUSH) 0.9 %
10 SYRINGE (ML) INJECTION AS NEEDED
Status: DISCONTINUED | OUTPATIENT
Start: 2018-12-13 | End: 2018-12-18 | Stop reason: HOSPADM

## 2018-12-13 RX ORDER — DEXTROSE MONOHYDRATE 25 G/50ML
25 INJECTION, SOLUTION INTRAVENOUS
Status: DISCONTINUED | OUTPATIENT
Start: 2018-12-13 | End: 2018-12-18 | Stop reason: HOSPADM

## 2018-12-13 RX ORDER — SODIUM CHLORIDE 0.9 % (FLUSH) 0.9 %
1-10 SYRINGE (ML) INJECTION AS NEEDED
Status: DISCONTINUED | OUTPATIENT
Start: 2018-12-13 | End: 2018-12-18 | Stop reason: HOSPADM

## 2018-12-13 RX ORDER — HYDROXYZINE 50 MG/1
50 TABLET, FILM COATED ORAL 3 TIMES DAILY PRN
Status: DISCONTINUED | OUTPATIENT
Start: 2018-12-13 | End: 2018-12-18 | Stop reason: HOSPADM

## 2018-12-13 RX ORDER — CHOLECALCIFEROL (VITAMIN D3) 125 MCG
1000 CAPSULE ORAL DAILY
Status: DISCONTINUED | OUTPATIENT
Start: 2018-12-14 | End: 2018-12-18 | Stop reason: HOSPADM

## 2018-12-13 RX ORDER — FUROSEMIDE 40 MG/1
40 TABLET ORAL DAILY
Status: DISCONTINUED | OUTPATIENT
Start: 2018-12-14 | End: 2018-12-16

## 2018-12-13 RX ORDER — SUCRALFATE 1 G/1
1 TABLET ORAL 2 TIMES DAILY
Status: DISCONTINUED | OUTPATIENT
Start: 2018-12-14 | End: 2018-12-18 | Stop reason: HOSPADM

## 2018-12-13 RX ORDER — SPIRONOLACTONE 100 MG/1
100 TABLET, FILM COATED ORAL DAILY
COMMUNITY
End: 2019-02-03 | Stop reason: SDUPTHER

## 2018-12-13 RX ORDER — CYCLOBENZAPRINE HCL 10 MG
5 TABLET ORAL NIGHTLY
Status: DISCONTINUED | OUTPATIENT
Start: 2018-12-13 | End: 2018-12-18 | Stop reason: HOSPADM

## 2018-12-13 RX ORDER — SPIRONOLACTONE 100 MG/1
100 TABLET, FILM COATED ORAL DAILY
Status: DISCONTINUED | OUTPATIENT
Start: 2018-12-14 | End: 2018-12-16

## 2018-12-13 RX ORDER — DULOXETIN HYDROCHLORIDE 30 MG/1
90 CAPSULE, DELAYED RELEASE ORAL DAILY
COMMUNITY
End: 2020-12-10

## 2018-12-13 RX ADMIN — SODIUM CHLORIDE 1000 ML: 9 INJECTION, SOLUTION INTRAVENOUS at 19:05

## 2018-12-13 RX ADMIN — INSULIN HUMAN 10 UNITS: 100 INJECTION, SOLUTION PARENTERAL at 22:00

## 2018-12-13 RX ADMIN — INSULIN HUMAN 10 UNITS: 100 INJECTION, SOLUTION PARENTERAL at 19:09

## 2018-12-13 NOTE — ED PROVIDER NOTES
EMERGENCY DEPARTMENT ENCOUNTER    Room Number:  25/25  Date seen:  12/13/2018  Time seen: 6:30 PM  PCP: Blanca Pitts MD   Endocrinologist: Dr. Burnett    HPI:  Chief complaint: Hyperglycemia  Context:Silvia Zabala is a 58 y.o. female with hx of DUKES and Type II DM who presents to the ED with c/o hyperglycemia that has been ongoing. Pt states that her glucose on home monitor was unable to be measured. Pt is on insulin and states that she is complaint with her insulin. Pt also reports having pain in the L shoulder at the site at her previous fx. She also reports having some nausea, abd distention, and LE edema. Pt has hx of depression and was previously on Wellbutrin, which she has stopped. Due to her continued health problems, she reports that her depression has worsened and she has had some fleeting SI with no definite plan.    Timing: constant  Duration: ongoing  Quality: hyperglycemia  Intensity/Severity: severe  Associated Symptoms: arthralgia, abd distention, nausea, edema  Aggravating Factors: none  Alleviating Factors:none  Previous Episodes:hx of poorly controlled DM  Treatment before arrival: seen at endocrinologist 2 days ago.     ALLERGIES  Albuterol; Tramadol; Lactulose; and Quinine derivatives    PAST MEDICAL HISTORY  Active Ambulatory Problems     Diagnosis Date Noted   • Cirrhosis of liver (CMS/HCC) 03/08/2016   • Rheumatoid arthritis (CMS/HCC) 03/08/2016   • Anxiety and depression 03/08/2016   • Type 2 diabetes mellitus, uncontrolled, with neuropathy (CMS/HCC) 03/15/2016   • Fibrositis 03/15/2016   • Change in blood platelet count 03/15/2016   • Pancytopenia (CMS/HCC) 04/12/2016   • Hypersplenism 04/12/2016   • Nausea 06/14/2016   • DUKES (nonalcoholic steatohepatitis) 06/14/2016   • Hyperlipidemia    • Abdominal pain 02/14/2017   • Hematemesis 02/15/2017   • Ascites 02/21/2017   • Portal hypertension (CMS/HCC) 02/22/2017   • Systemic lupus (CMS/HCC) 02/22/2017   • Secondary esophageal varices  without bleeding (CMS/HCC) 02/22/2017   • Gastroparesis 10/17/2017   • Cirrhosis of liver with ascites (CMS/HCC) 11/17/2017   • Diabetic ketoacidosis without coma associated with type 2 diabetes mellitus (CMS/HCC) 12/16/2017   • Diabetic peripheral neuropathy (CMS/HCC) 12/17/2017   • History of seizure disorder 12/17/2017   • Hepatic encephalopathy (CMS/HCC) 05/08/2018   • Abnormal finding of blood chemistry  05/08/2018   • Fever 05/17/2018   • Acute ITP (CMS/HCC) 05/18/2018   • Degeneration of lumbosacral intervertebral disc 05/30/2018   • Seizure (CMS/HCC) 05/30/2018   • Spondylosis without myelopathy 05/30/2018   • Intractable vomiting with nausea 10/03/2018   • UGI bleed 10/04/2018   • Migraine without aura 10/11/2018   • Urinary retention 10/11/2018     Resolved Ambulatory Problems     Diagnosis Date Noted   • Secondary esophageal varices with bleeding (CMS/HCC) 03/07/2017   • Upper GI bleed 04/25/2017   • Thrombocytopenia (CMS/HCC) 05/16/2018     Past Medical History:   Diagnosis Date   • Anemia    • Anxiety    • Arthritis    • Broken shoulder    • Chromosomal abnormality    • Cirrhosis (CMS/HCC)    • Colon polyps    • Deafness    • Depression    • Diabetes mellitus (CMS/HCC)    • Duodenal ulcer with hemorrhage    • Esophageal varices determined by endoscopy (CMS/HCC)    • Fibromyalgia    • Gallbladder disease    • Gastric varices    • Gastroparesis    • Glaucoma    • Granuloma annulare    • H/O B12 deficiency    • H/O Bleeding ulcer    • H/O mixed connective tissue disorder    • Hemorrhoids    • Hiatal hernia    • History of transfusion    • Hx of being hospitalized    • Hyperlipidemia    • Migraine    • DUKES (nonalcoholic steatohepatitis) 11/2016   • BRICE (obstructive sleep apnea)    • Pancreas divisum    • Pancytopenia (CMS/HCC)    • Peptic ulcer disease    • PONV (postoperative nausea and vomiting)    • RA (rheumatoid arthritis) (CMS/HCC)    • Seizure disorder (CMS/HCC)    • Seizures (CMS/HCC)    • Systemic  "lupus (CMS/HCC)        PAST SURGICAL HISTORY  Past Surgical History:   Procedure Laterality Date   • BLADDER REPAIR     • CATARACT EXTRACTION     • CHOLECYSTECTOMY     • ENDOSCOPY AND COLONOSCOPY      Dr. Rich Coleman with findings of candida esophagitis   • EYE SURGERY     • HYSTERECTOMY      partial   • JOINT REPLACEMENT     • KNEE SURGERY Right     \"right knee recontstruction\"   • LIVER BIOPSY  2015   • MASS EXCISION     • STOMACH SURGERY         FAMILY HISTORY  Family History   Problem Relation Age of Onset   • Liver disease Other    • Depression Other    • Heart disease Other    • Hypertension Other    • Diabetes Other    • Breast cancer Other    • Brain cancer Other    • Uterine cancer Other    • Colon cancer Other    • Leukemia Other    • Colon cancer Maternal Aunt    • Hypertension Mother    • Diabetes type II Mother    • Rheum arthritis Mother    • Liver disease Mother         DUKES   • Heart disease Father    • Diabetes type II Father    • Diabetes type II Sister    • Lupus Sister    • Malig Hyperthermia Neg Hx        SOCIAL HISTORY  Social History     Socioeconomic History   • Marital status:      Spouse name: Jose   • Number of children: Not on file   • Years of education: Not on file   • Highest education level: Not on file   Social Needs   • Financial resource strain: Not on file   • Food insecurity - worry: Not on file   • Food insecurity - inability: Not on file   • Transportation needs - medical: Not on file   • Transportation needs - non-medical: Not on file   Occupational History     Employer: DISABLED   Tobacco Use   • Smoking status: Former Smoker     Packs/day: 0.00     Years: 0.00     Pack years: 0.00     Last attempt to quit: 2015     Years since quittin.9   • Smokeless tobacco: Never Used   Substance and Sexual Activity   • Alcohol use: No   • Drug use: No   • Sexual activity: Defer   Other Topics Concern   • Not on file   Social History Narrative "    Moved to [place] from [place]. She is currently medically disabled.       REVIEW OF SYSTEMS  Review of Systems   Constitutional: Negative for activity change, appetite change, diaphoresis and fever.   HENT: Negative for trouble swallowing.    Eyes: Negative for visual disturbance.   Respiratory: Negative for cough, chest tightness, shortness of breath and wheezing.    Cardiovascular: Positive for leg swelling. Negative for chest pain and palpitations.   Gastrointestinal: Positive for abdominal distention, abdominal pain and nausea. Negative for diarrhea and vomiting.   Endocrine:        Hyperglycemia   Genitourinary: Negative for dysuria.   Musculoskeletal: Positive for arthralgias (L shoulder). Negative for back pain.   Skin: Negative for rash.   Neurological: Negative for dizziness, speech difficulty and light-headedness.   Psychiatric/Behavioral: Positive for dysphoric mood and suicidal ideas (fleeting).       PHYSICAL EXAM  ED Triage Vitals [12/13/18 1825]   Temp Heart Rate Resp BP SpO2   98.4 °F (36.9 °C) 111 18 -- 98 %      Temp src Heart Rate Source Patient Position BP Location FiO2 (%)   Tympanic Monitor -- -- --     Physical Exam   Constitutional: She is oriented to person, place, and time and well-developed, well-nourished, and in no distress. No distress.   HENT:   Head: Normocephalic and atraumatic.   Mouth/Throat: Uvula is midline and mucous membranes are normal.   Neck: Normal range of motion. Neck supple.   Cardiovascular: S1 normal, S2 normal and normal heart sounds. Tachycardia present. Exam reveals no gallop and no friction rub.   No murmur heard.  Pulmonary/Chest: Effort normal. She has decreased breath sounds. She has no wheezes. She has no rhonchi. She has no rales.   Abdominal: Soft. Normal appearance. She exhibits distension (and rounded). There is no rebound and no guarding.   Musculoskeletal: Normal range of motion. She exhibits edema (2+ BLE).        Left shoulder: She exhibits  tenderness.        Left upper arm: She exhibits tenderness.   Neurological: She is alert and oriented to person, place, and time.   Skin: Skin is warm, dry and intact.   Psychiatric: Affect and judgment normal. She exhibits a depressed mood.   Nursing note and vitals reviewed.      LAB RESULTS  Recent Results (from the past 24 hour(s))   POC Glucose Once    Collection Time: 12/13/18  6:32 PM   Result Value Ref Range    Glucose 584 (C) 70 - 130 mg/dL   Comprehensive Metabolic Panel    Collection Time: 12/13/18  7:00 PM   Result Value Ref Range    Glucose 608 (C) 65 - 99 mg/dL    BUN 11 6 - 20 mg/dL    Creatinine 0.87 0.57 - 1.00 mg/dL    Sodium 131 (L) 136 - 145 mmol/L    Potassium 4.8 3.5 - 5.2 mmol/L    Chloride 95 (L) 98 - 107 mmol/L    CO2 21.9 (L) 22.0 - 29.0 mmol/L    Calcium 8.9 8.6 - 10.5 mg/dL    Total Protein 6.5 6.0 - 8.5 g/dL    Albumin 3.30 (L) 3.50 - 5.20 g/dL    ALT (SGPT) 31 1 - 33 U/L    AST (SGOT) 39 (H) 1 - 32 U/L    Alkaline Phosphatase 247 (H) 39 - 117 U/L    Total Bilirubin 0.8 0.1 - 1.2 mg/dL    eGFR Non African Amer 67 >60 mL/min/1.73    Globulin 3.2 gm/dL    A/G Ratio 1.0 g/dL    BUN/Creatinine Ratio 12.6 7.0 - 25.0    Anion Gap 14.1 mmol/L   Lipase    Collection Time: 12/13/18  7:00 PM   Result Value Ref Range    Lipase 8 (L) 13 - 60 U/L   CBC Auto Differential    Collection Time: 12/13/18  7:00 PM   Result Value Ref Range    WBC 2.88 (L) 4.50 - 10.70 10*3/mm3    RBC 3.81 (L) 3.90 - 5.20 10*6/mm3    Hemoglobin 10.1 (L) 11.9 - 15.5 g/dL    Hematocrit 34.0 (L) 35.6 - 45.5 %    MCV 89.2 80.5 - 98.2 fL    MCH 26.5 (L) 26.9 - 32.0 pg    MCHC 29.7 (L) 32.4 - 36.3 g/dL    RDW 17.0 (H) 11.7 - 13.0 %    RDW-SD 55.7 (H) 37.0 - 54.0 fl    MPV 10.3 6.0 - 12.0 fL    Platelets 99 (L) 140 - 500 10*3/mm3    Neutrophil % 72.0 42.7 - 76.0 %    Lymphocyte % 12.8 (L) 19.6 - 45.3 %    Monocyte % 11.8 5.0 - 12.0 %    Eosinophil % 2.4 0.3 - 6.2 %    Basophil % 0.3 0.0 - 1.5 %    Immature Grans % 0.7 (H) 0.0 - 0.5  %    Neutrophils, Absolute 2.07 1.90 - 8.10 10*3/mm3    Lymphocytes, Absolute 0.37 (L) 0.90 - 4.80 10*3/mm3    Monocytes, Absolute 0.34 0.20 - 1.20 10*3/mm3    Eosinophils, Absolute 0.07 0.00 - 0.70 10*3/mm3    Basophils, Absolute 0.01 0.00 - 0.20 10*3/mm3    Immature Grans, Absolute 0.02 0.00 - 0.03 10*3/mm3   Hemoglobin A1c    Collection Time: 12/13/18  7:00 PM   Result Value Ref Range    Hemoglobin A1C 9.20 (H) 4.80 - 5.60 %   Ethanol    Collection Time: 12/13/18  7:00 PM   Result Value Ref Range    Ethanol <10 0 - 10 mg/dL    Ethanol % <0.010 %   Light Blue Top    Collection Time: 12/13/18  7:00 PM   Result Value Ref Range    Extra Tube hold for add-on    Green Top (Gel)    Collection Time: 12/13/18  7:00 PM   Result Value Ref Range    Extra Tube Hold for add-ons.    Lavender Top    Collection Time: 12/13/18  7:00 PM   Result Value Ref Range    Extra Tube hold for add-on    Gold Top - SST    Collection Time: 12/13/18  7:00 PM   Result Value Ref Range    Extra Tube Hold for add-ons.    Ammonia    Collection Time: 12/13/18  7:03 PM   Result Value Ref Range    Ammonia 87 (H) 11 - 51 umol/L   Urinalysis With Microscopic If Indicated (No Culture) - Urine, Catheter    Collection Time: 12/13/18  7:15 PM   Result Value Ref Range    Color, UA Yellow Yellow, Straw    Appearance, UA Clear Clear    pH, UA 6.0 5.0 - 8.0    Specific Gravity, UA 1.023 1.005 - 1.030    Glucose, UA >=1000 mg/dL (3+) (A) Negative    Ketones, UA Negative Negative    Bilirubin, UA Negative Negative    Blood, UA Negative Negative    Protein, UA Negative Negative    Leuk Esterase, UA Negative Negative    Nitrite, UA Negative Negative    Urobilinogen, UA 0.2 E.U./dL 0.2 - 1.0 E.U./dL   Urine Drug Screen - Urine, Catheter    Collection Time: 12/13/18  7:15 PM   Result Value Ref Range    Amphet/Methamphet, Screen Negative Negative    Barbiturates Screen, Urine Negative Negative    Benzodiazepine Screen, Urine Negative Negative    Cocaine Screen, Urine  Negative Negative    Opiate Screen Negative Negative    THC, Screen, Urine Negative Negative    Methadone Screen, Urine Negative Negative    Oxycodone Screen, Urine Positive (A) Negative   POC Glucose Once    Collection Time: 12/13/18  8:36 PM   Result Value Ref Range    Glucose 499 (C) 70 - 130 mg/dL       I ordered the above labs and reviewed the results    RADIOLOGY  XR Chest 2 View   Final Result      Chest findings: There is a left humerus fracture. The left ribs overall  are satisfactory in appearance.     Cardiac size upper normal. Mediastinum and anaid are satisfactory in  appearance. No pneumothorax, pleural effusion, vascular congestion nor  active airspace disease demonstrated.     CONCLUSION: No active cardiovascular or pulmonary process is  demonstrated. Left humeral fracture.   XR Shoulder 2+ View Left   Final Result   Left shoulder left humerus findings: There is a fracture demonstrated  through the surgical neck of the left humeral head. There is rotation of  the humeral head however no dislocation. Superior displacement of the  shaft. At the site of fracture the bone demonstrates a mottled  appearance, potential pathologic fracture. The acromioclavicular  alignment is satisfactory. No fracture of the scapula or adjacent ribs.  Overlying soft tissues are satisfactory in appearance. Mid and distal  humerus are normal in appearance.     CONCLUSION: Fracture through the surgical neck of the left humeral head,  potential pathologic fracture.   XR Humerus Left   Final Result      Chest findings: There is a left humerus fracture. The left ribs overall  are satisfactory in appearance.     Cardiac size upper normal. Mediastinum and anaid are satisfactory in  appearance. No pneumothorax, pleural effusion, vascular congestion nor  active airspace disease demonstrated.     CONCLUSION: No active cardiovascular or pulmonary process is  demonstrated. Left humeral fracture.       I ordered the above noted  radiological studies and reviewed the images on the PACS system.    MEDICATIONS GIVEN IN ER  Medications   sodium chloride 0.9 % flush 10 mL (not administered)   insulin regular (humuLIN R,novoLIN R) injection 10 Units (not administered)   sodium chloride 0.9 % bolus 1,000 mL (1,000 mL Intravenous New Bag 12/13/18 1905)   insulin regular (humuLIN R,novoLIN R) injection 10 Units (10 Units Intravenous Given 12/13/18 1909)       EKG  EKG          EKG time: 1901  Rhythm/Rate: Sinus tachycardia rate 102  P waves and VA: Nml VA  QRS, axis: Nml QRS   ST and T waves: No ST abnormalities     Interpreted Contemporaneously by me, independently viewed  Improved compared to prior 10/4/18      COURSE & MEDICAL DECISION MAKING  Pertinent Labs and Imaging studies that were ordered and reviewed are noted above.  Results were reviewed/discussed with the patient and they were also made aware of online access.  Pt also made aware that some labs, such as cultures, will not be resulted during ER visit and follow up with PMD is necessary.     PROGRESS AND CONSULTS    Progress Notes:  1835 Ordered CBC, UA, lipase, CMP, and POC glucose for further evaluation. Ordered insulin and IVF for hydration.    1845 Ordered EKG, hemoglobin A1c, ethanol, UDS, ammonia, CXR, XR L shoulder, and XR L humerus for further evaluation.    2040 Reviewed pt's history and workup with Dr. Candelario.  After a bedside evaluation, Dr. Candelario agrees with the plan of care.    2043 Placed call to [facility] for admission.    2104 Discussed pt with Dr. Holm, [facility], who agrees to admit.    2109 Rechecked with pt and discussed plan to admit. Pt understands and agrees with the plan, all questions answered.    ADMISSION    Based on the patient's lab findings and presenting symptoms, the doctor and I feel it is appropriate to admit the patient for further management, evaluation, and treatment.  I have discussed this with the admitting team.  I have also discussed this with the  "patient/family.  They are in agreement with admission.        Disposition vitals:  /73   Pulse 98   Temp 98.4 °F (36.9 °C) (Tympanic)   Resp 18   Ht 168.9 cm (66.5\")   Wt 80 kg (176 lb 4.8 oz)   SpO2 99%   BMI 28.03 kg/m²       DIAGNOSIS  Final diagnoses:   Type 2 diabetes mellitus with hyperglycemia, with long-term current use of insulin (CMS/[facility])   Anasarca   DUKES (nonalcoholic steatohepatitis)   Depression, unspecified depression type   Closed 2-part displaced fracture of surgical neck of left humerus, initial encounter, non acute     Documentation assistance provided by tori Hyatt and Teena Fajardo for JOEL Gonzalez.  Information recorded by the scribe was done at my direction and has been verified and validated by me.  Electronically signed by Klever Hyatt and Teena Fajardo on 12/13/2018 at time 6:30 PM.           Rich Hyatt  12/13/18 1936        Teena Fajardo  12/13/18 2112       Anisha East APRN  12/13/18 2115    "

## 2018-12-13 NOTE — ED TRIAGE NOTES
"Pt states that she is here for several issues. States that her blood sugar is \"too high to read\" on her monitor. Reports that she was seen by her PCP 2 days ago and was told to come to the ER then. Also complains of liver disease, difficulty urinating, and states that she had a broken arm and fell into the wall last night on that arm.   "

## 2018-12-14 LAB
ALBUMIN SERPL-MCNC: 3.1 G/DL (ref 3.5–5.2)
ALBUMIN/GLOB SERPL: 1 G/DL
ALP SERPL-CCNC: 215 U/L (ref 39–117)
ALT SERPL W P-5'-P-CCNC: 25 U/L (ref 1–33)
ANION GAP SERPL CALCULATED.3IONS-SCNC: 13.5 MMOL/L
AST SERPL-CCNC: 34 U/L (ref 1–32)
BASOPHILS # BLD AUTO: 0.02 10*3/MM3 (ref 0–0.2)
BASOPHILS NFR BLD AUTO: 0.6 % (ref 0–1.5)
BILIRUB SERPL-MCNC: 0.7 MG/DL (ref 0.1–1.2)
BUN BLD-MCNC: 9 MG/DL (ref 6–20)
BUN/CREAT SERPL: 14.8 (ref 7–25)
CALCIUM SPEC-SCNC: 9 MG/DL (ref 8.6–10.5)
CHLORIDE SERPL-SCNC: 99 MMOL/L (ref 98–107)
CO2 SERPL-SCNC: 22.5 MMOL/L (ref 22–29)
CREAT BLD-MCNC: 0.61 MG/DL (ref 0.57–1)
DEPRECATED RDW RBC AUTO: 52.3 FL (ref 37–54)
EOSINOPHIL # BLD AUTO: 0.1 10*3/MM3 (ref 0–0.7)
EOSINOPHIL NFR BLD AUTO: 3.2 % (ref 0.3–6.2)
ERYTHROCYTE [DISTWIDTH] IN BLOOD BY AUTOMATED COUNT: 17 % (ref 11.7–13)
GFR SERPL CREATININE-BSD FRML MDRD: 101 ML/MIN/1.73
GLOBULIN UR ELPH-MCNC: 3.1 GM/DL
GLUCOSE BLD-MCNC: 225 MG/DL (ref 65–99)
GLUCOSE BLDC GLUCOMTR-MCNC: 102 MG/DL (ref 70–130)
GLUCOSE BLDC GLUCOMTR-MCNC: 200 MG/DL (ref 70–130)
GLUCOSE BLDC GLUCOMTR-MCNC: 213 MG/DL (ref 70–130)
GLUCOSE BLDC GLUCOMTR-MCNC: 217 MG/DL (ref 70–130)
GLUCOSE BLDC GLUCOMTR-MCNC: 381 MG/DL (ref 70–130)
HCT VFR BLD AUTO: 30.4 % (ref 35.6–45.5)
HGB BLD-MCNC: 9.6 G/DL (ref 11.9–15.5)
IMM GRANULOCYTES # BLD: 0.03 10*3/MM3 (ref 0–0.03)
IMM GRANULOCYTES NFR BLD: 1 % (ref 0–0.5)
LYMPHOCYTES # BLD AUTO: 0.66 10*3/MM3 (ref 0.9–4.8)
LYMPHOCYTES NFR BLD AUTO: 21 % (ref 19.6–45.3)
MCH RBC QN AUTO: 26.4 PG (ref 26.9–32)
MCHC RBC AUTO-ENTMCNC: 31.6 G/DL (ref 32.4–36.3)
MCV RBC AUTO: 83.5 FL (ref 80.5–98.2)
MONOCYTES # BLD AUTO: 0.43 10*3/MM3 (ref 0.2–1.2)
MONOCYTES NFR BLD AUTO: 13.7 % (ref 5–12)
NEUTROPHILS # BLD AUTO: 1.94 10*3/MM3 (ref 1.9–8.1)
NEUTROPHILS NFR BLD AUTO: 61.5 % (ref 42.7–76)
PLATELET # BLD AUTO: 100 10*3/MM3 (ref 140–500)
PMV BLD AUTO: 10.2 FL (ref 6–12)
POTASSIUM BLD-SCNC: 3.8 MMOL/L (ref 3.5–5.2)
PROT SERPL-MCNC: 6.2 G/DL (ref 6–8.5)
RBC # BLD AUTO: 3.64 10*6/MM3 (ref 3.9–5.2)
SODIUM BLD-SCNC: 135 MMOL/L (ref 136–145)
WBC NRBC COR # BLD: 3.15 10*3/MM3 (ref 4.5–10.7)

## 2018-12-14 PROCEDURE — 80053 COMPREHEN METABOLIC PANEL: CPT | Performed by: NURSE PRACTITIONER

## 2018-12-14 PROCEDURE — 82962 GLUCOSE BLOOD TEST: CPT

## 2018-12-14 PROCEDURE — 99214 OFFICE O/P EST MOD 30 MIN: CPT | Performed by: INTERNAL MEDICINE

## 2018-12-14 PROCEDURE — 63710000001 INSULIN REGULAR HUMAN PER 5 UNITS: Performed by: NURSE PRACTITIONER

## 2018-12-14 PROCEDURE — 84439 ASSAY OF FREE THYROXINE: CPT | Performed by: INTERNAL MEDICINE

## 2018-12-14 PROCEDURE — 99244 OFF/OP CNSLTJ NEW/EST MOD 40: CPT | Performed by: INTERNAL MEDICINE

## 2018-12-14 PROCEDURE — G0378 HOSPITAL OBSERVATION PER HR: HCPCS

## 2018-12-14 PROCEDURE — 63710000001 PROMETHAZINE PER 25 MG: Performed by: NURSE PRACTITIONER

## 2018-12-14 PROCEDURE — 90791 PSYCH DIAGNOSTIC EVALUATION: CPT

## 2018-12-14 PROCEDURE — 63710000001 INSULIN LISPRO (HUMAN) PER 5 UNITS: Performed by: INTERNAL MEDICINE

## 2018-12-14 PROCEDURE — 36415 COLL VENOUS BLD VENIPUNCTURE: CPT | Performed by: NURSE PRACTITIONER

## 2018-12-14 PROCEDURE — 63710000001 INSULIN LISPRO (HUMAN) PER 5 UNITS: Performed by: NURSE PRACTITIONER

## 2018-12-14 PROCEDURE — 84443 ASSAY THYROID STIM HORMONE: CPT | Performed by: INTERNAL MEDICINE

## 2018-12-14 PROCEDURE — 63710000001 INSULIN GLARGINE PER 5 UNITS: Performed by: INTERNAL MEDICINE

## 2018-12-14 PROCEDURE — 85025 COMPLETE CBC W/AUTO DIFF WBC: CPT | Performed by: NURSE PRACTITIONER

## 2018-12-14 RX ORDER — INSULIN GLARGINE 100 [IU]/ML
50 INJECTION, SOLUTION SUBCUTANEOUS NIGHTLY
Status: DISCONTINUED | OUTPATIENT
Start: 2018-12-14 | End: 2018-12-15

## 2018-12-14 RX ORDER — OXYCODONE HYDROCHLORIDE 15 MG/1
7.5 TABLET ORAL EVERY 8 HOURS PRN
Status: DISCONTINUED | OUTPATIENT
Start: 2018-12-14 | End: 2018-12-18 | Stop reason: HOSPADM

## 2018-12-14 RX ORDER — PREGABALIN 25 MG/1
75 CAPSULE ORAL EVERY 12 HOURS SCHEDULED
Status: DISCONTINUED | OUTPATIENT
Start: 2018-12-14 | End: 2018-12-18 | Stop reason: HOSPADM

## 2018-12-14 RX ORDER — ALPRAZOLAM 0.5 MG/1
0.5 TABLET ORAL 2 TIMES DAILY PRN
Status: DISCONTINUED | OUTPATIENT
Start: 2018-12-14 | End: 2018-12-18 | Stop reason: HOSPADM

## 2018-12-14 RX ADMIN — FOLIC ACID 1 MG: 1 TABLET ORAL at 09:00

## 2018-12-14 RX ADMIN — SUCRALFATE 1 G: 1 TABLET ORAL at 01:26

## 2018-12-14 RX ADMIN — PROMETHAZINE HYDROCHLORIDE 25 MG: 25 TABLET ORAL at 01:26

## 2018-12-14 RX ADMIN — SODIUM CHLORIDE, PRESERVATIVE FREE 3 ML: 5 INJECTION INTRAVENOUS at 22:05

## 2018-12-14 RX ADMIN — TRIMETHOBENZAMIDE HYDROCHLORIDE 300 MG: 300 CAPSULE ORAL at 01:26

## 2018-12-14 RX ADMIN — TRIMETHOBENZAMIDE HYDROCHLORIDE 300 MG: 300 CAPSULE ORAL at 15:09

## 2018-12-14 RX ADMIN — INSULIN GLARGINE 50 UNITS: 100 INJECTION, SOLUTION SUBCUTANEOUS at 21:18

## 2018-12-14 RX ADMIN — RIFAXIMIN 550 MG: 550 TABLET ORAL at 21:20

## 2018-12-14 RX ADMIN — CYCLOBENZAPRINE 5 MG: 10 TABLET, FILM COATED ORAL at 21:20

## 2018-12-14 RX ADMIN — ALPRAZOLAM 0.5 MG: 0.5 TABLET ORAL at 21:25

## 2018-12-14 RX ADMIN — INSULIN LISPRO 8 UNITS: 100 INJECTION, SOLUTION INTRAVENOUS; SUBCUTANEOUS at 21:20

## 2018-12-14 RX ADMIN — INSULIN LISPRO 8 UNITS: 100 INJECTION, SOLUTION INTRAVENOUS; SUBCUTANEOUS at 08:54

## 2018-12-14 RX ADMIN — PANTOPRAZOLE SODIUM 40 MG: 40 TABLET, DELAYED RELEASE ORAL at 06:47

## 2018-12-14 RX ADMIN — LEVETIRACETAM 500 MG: 500 TABLET, FILM COATED ORAL at 01:26

## 2018-12-14 RX ADMIN — INSULIN LISPRO 20 UNITS: 100 INJECTION, SOLUTION INTRAVENOUS; SUBCUTANEOUS at 01:49

## 2018-12-14 RX ADMIN — ONDANSETRON 4 MG: 4 TABLET, FILM COATED ORAL at 15:09

## 2018-12-14 RX ADMIN — TRIMETHOBENZAMIDE HYDROCHLORIDE 300 MG: 300 CAPSULE ORAL at 21:20

## 2018-12-14 RX ADMIN — SUCRALFATE 1 G: 1 TABLET ORAL at 21:19

## 2018-12-14 RX ADMIN — PREGABALIN 75 MG: 25 CAPSULE ORAL at 21:19

## 2018-12-14 RX ADMIN — SPIRONOLACTONE 100 MG: 100 TABLET ORAL at 09:00

## 2018-12-14 RX ADMIN — SUCRALFATE 1 G: 1 TABLET ORAL at 09:00

## 2018-12-14 RX ADMIN — OXYCODONE HYDROCHLORIDE 7.5 MG: 15 TABLET ORAL at 15:10

## 2018-12-14 RX ADMIN — INSULIN LISPRO 8 UNITS: 100 INJECTION, SOLUTION INTRAVENOUS; SUBCUTANEOUS at 11:57

## 2018-12-14 RX ADMIN — AMITRIPTYLINE HYDROCHLORIDE 50 MG: 50 TABLET, FILM COATED ORAL at 21:19

## 2018-12-14 RX ADMIN — RIFAXIMIN 550 MG: 550 TABLET ORAL at 01:26

## 2018-12-14 RX ADMIN — OXYCODONE HYDROCHLORIDE 7.5 MG: 15 TABLET ORAL at 06:44

## 2018-12-14 RX ADMIN — AMITRIPTYLINE HYDROCHLORIDE 50 MG: 50 TABLET, FILM COATED ORAL at 01:26

## 2018-12-14 RX ADMIN — Medication 1000 MCG: at 09:00

## 2018-12-14 RX ADMIN — MULTIPLE VITAMINS W/ MINERALS TAB 1 TABLET: TAB at 09:00

## 2018-12-14 RX ADMIN — LEVETIRACETAM 500 MG: 500 TABLET, FILM COATED ORAL at 21:20

## 2018-12-14 RX ADMIN — PREGABALIN 75 MG: 25 CAPSULE ORAL at 06:37

## 2018-12-14 RX ADMIN — SODIUM CHLORIDE, PRESERVATIVE FREE 10 ML: 5 INJECTION INTRAVENOUS at 01:29

## 2018-12-14 RX ADMIN — INSULIN HUMAN 65 UNITS: 500 INJECTION, SOLUTION SUBCUTANEOUS at 08:55

## 2018-12-14 RX ADMIN — TRIMETHOBENZAMIDE HYDROCHLORIDE 300 MG: 300 CAPSULE ORAL at 09:00

## 2018-12-14 RX ADMIN — LEVETIRACETAM 500 MG: 500 TABLET, FILM COATED ORAL at 09:00

## 2018-12-14 RX ADMIN — INSULIN LISPRO 10 UNITS: 100 INJECTION, SOLUTION INTRAVENOUS; SUBCUTANEOUS at 11:58

## 2018-12-14 RX ADMIN — RIFAXIMIN 550 MG: 550 TABLET ORAL at 09:08

## 2018-12-14 RX ADMIN — SODIUM CHLORIDE, PRESERVATIVE FREE 3 ML: 5 INJECTION INTRAVENOUS at 01:37

## 2018-12-14 RX ADMIN — DULOXETINE HYDROCHLORIDE 90 MG: 60 CAPSULE, DELAYED RELEASE ORAL at 09:00

## 2018-12-14 RX ADMIN — FUROSEMIDE 40 MG: 40 TABLET ORAL at 09:00

## 2018-12-14 NOTE — CONSULTS
"Pt was seen by AC in 2017, given referrals at that time.      Pt was interviewed alone in Rm 635.  Pleasant, good eye contact, speech nl rate, volume.  Talkative.  Smiled easily     Pt admitted for multiple medical symptoms, has multiple medical issues.  Noted to be easily tearful but was easily distracted away from the tears.       x 4.  Eren is current spouse x 12 yr.  They moved to [place] from [place] approx 8-9yr ago for his job.  Her father, sister, 3 adult children are in [place].  Her mo-in-law lives with them.  Pt is on disability since approx 2002.  Used to do clerical work.      Pt denies current SI/HI.  Denies suicide plan, intent or attempt now or in past.  Admits to wishing \"it would all go away\" sometimes.  Denies inpt psych.  Denies seeing a psych MD in past.  Denies CD issues or treatment now or past.  Did see a therapist last year, can't remember name or where the office was located.  Stopped going to therapy because she was charged for appt that had to miss d/t illness.      Easily describes her many medical diagnosis, symptoms.  Has concerns over dtr in [place] (bipolar) and her children there, pt's father is raising 2 of this dtr's children.  The children are doing well.      She is unsure what she wants for referrals.  Will give psych MD and therapy referrals.  She qualified for [place] last week, needs to get the permit downtown.      Did a breathing exercise with pt that she stated was helpful.  Rated her depression as a \"6\" on 1-10 scale, rated her anxiety as a \"6\" on 1-10 scale.  She feels better overall today, b.s noted to have significantly come down.      AC will not follow, d/w RN.    "

## 2018-12-14 NOTE — ED PROVIDER NOTES
MD ATTESTATION NOTE  The AYESHA and I have discussed this patient's history, physical exam, and treatment plan.  I have reviewed the documentation and personally had a face to face interaction with the patient. I affirm the documentation and agree with the treatment and plan.  The attached note describes my personal findings.    Hx- Pt presents c/o hyperglycemia for the past several months. She says she is on insulin and is compliant with her insulin Pt also c/o LE swelling recently, L shoulder pain (s/p a Fx), nausea, abd distension, and SOA.     PEx-  Constitution- NAD  Heart- RRR, no murmur,  Lungs- CTAB, no respiratory distress  Abd- soft, non-tender, no rebound, no guarding  Neuro- A/Ox3  Musc- 2+ pedal edema    Plan- To admit pt for further evaluation and treatment.     I have reviewed the pt's lab and imaging results and discussed these results with the pt and with JOEL Gonzalez. See pertinent results below:    Glucose- 608 (no evidence of DKA)    CXR- negative acute, but shows previous L humeral fracture    Documentation assistance provided by tori Dash for Dr. Candelario.  Information recorded by the tori was done at my direction and has been verified and validated by me.       Gerson Dash  12/13/18 2046       Daryn Candelario MD  12/13/18 2124

## 2018-12-14 NOTE — H&P
Name: Silvia Zabala ADMIT: 2018   : 1960  PCP: Blanca Pitts MD    MRN: [id number] LOS: 0 days   AGE/SEX: 58 y.o. female  ROOM:      Chief Complaint   Patient presents with   • Hyperglycemia       Subjective     Ms. Zabala is a 58 y.o. female with a history of DUKES, cirrhosis, depression, DM1, seizure, BRICE, RA, SLE, HLD, gastroparesis that presents to [facility] complaining of elevated blood glucose and nausea. Patient states that she has been compliant with insulin and other meds, though despite this and recently being seen by endocrinologist, her blood sugars have remained high. Today she states that her blood glucose monitor wouldn't read her glucose because it was too high, though she reports that this has been ongoing for several months. She also reports nausea without vomiting, but this is also chronic as she does have gastroparesis and was taken off Reglan due to tardive dyskinesia. She additionally reports pain to her left shoulder after fall 4 weeks ago and broken humerus, no surgical intervention warranted. She reports that she does have appointment with orthopedist in January and has been wearing a sling since her injury. She reports decreased appetite, chills, fever, congestion, cough, intermittent shortness of breath and chest pains, abdominal pain, nausea, vomiting, gait instability, incomplete emptying of bladder, back pain, headache, lower extremity swelling and weakness. None of these symptoms are new and she denies any acute changes that actually brought her in to the hospital tonight. She additionaly reports that she was taken off of her Wellbutrin and since that time admits to having suicidal thoughts, though denies plan and states that she wouldn't actually harm herself because she loves her , grandchildren and father. Of note, she does report that while in [place] about a month ago, she ran out of insulin and was without for about a week,  "however, blood sugars had been out of control prior to that time.     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   • Cirrhosis (CMS/HCC)    • Colon polyps    • Deafness    • Depression    • Diabetes mellitus (CMS/HCC)     Adult onset, insulin requiring   • Duodenal ulcer with hemorrhage    • Esophageal varices determined by endoscopy (CMS/HCC)    • Fibromyalgia    • Gallbladder disease    • Gastric varices    • Gastroparesis    • Glaucoma    • Granuloma annulare    • H/O B12 deficiency    • H/O Bleeding ulcer     And gastroesophageal varices   • H/O mixed connective tissue disorder    • Hemorrhoids    • Hiatal hernia    • History of transfusion    • Hx of being hospitalized     In [place] for GI bleeding from ulcer and varices   • Hyperlipidemia    • Migraine    • DUKES (nonalcoholic steatohepatitis) 11/2016   • BRICE (obstructive sleep apnea)    • Pancreas divisum    • Pancytopenia (CMS/HCC)     Chronic, due to cirrhosis and hypersplenism   • Peptic ulcer disease     And esophageal varices   • PONV (postoperative nausea and vomiting)    • RA (rheumatoid arthritis) (CMS/HCC)    • Seizure disorder (CMS/HCC)     LAST ONE SEVERAL YEARS AGO   • Seizures (CMS/HCC)    • Systemic lupus (CMS/HCC)      Past Surgical History:   Procedure Laterality Date   • BLADDER REPAIR  1991   • CATARACT EXTRACTION     • CHOLECYSTECTOMY  1994   • ENDOSCOPY AND COLONOSCOPY  2014    Dr. Rich Coleman with findings of candida esophagitis   • EYE SURGERY     • HYSTERECTOMY  1986    partial   • JOINT REPLACEMENT     • KNEE SURGERY Right 1995    \"right knee recontstruction\"   • LIVER BIOPSY  01/2015   • MASS EXCISION     • STOMACH SURGERY       Family History   Problem Relation Age of Onset   • " "Liver disease Other    • Depression Other    • Heart disease Other    • Hypertension Other    • Diabetes Other    • Breast cancer Other    • Brain cancer Other    • Uterine cancer Other    • Colon cancer Other    • Leukemia Other    • Colon cancer Maternal Aunt    • Hypertension Mother    • Diabetes type II Mother    • Rheum arthritis Mother    • Liver disease Mother         DUKES   • Heart disease Father    • Diabetes type II Father    • Diabetes type II Sister    • Lupus Sister    • Malig Hyperthermia Neg Hx      Social History     Tobacco Use   • Smoking status: Former Smoker     Packs/day: 0.00     Years: 0.00     Pack years: 0.00     Last attempt to quit: 2015     Years since quittin.9   • Smokeless tobacco: Never Used   Substance Use Topics   • Alcohol use: No   • Drug use: No       (Not in a hospital admission)  Allergies:    Allergies   Allergen Reactions   • Albuterol Anaphylaxis   • Tramadol Nausea Only, Other (See Comments) and GI Intolerance     Per pt causes her \"palsy, meaning shaking and tremors\"    • Lactulose Nausea And Vomiting and Other (See Comments)     Severe abdominal pain   • Quinine Derivatives Nausea And Vomiting       Review of Systems   Constitutional: Positive for activity change, appetite change, chills and fever.   HENT: Positive for congestion and sore throat.    Eyes: Negative.  Negative for visual disturbance.   Respiratory: Positive for cough and shortness of breath.    Cardiovascular: Positive for chest pain and leg swelling.   Gastrointestinal: Positive for abdominal pain, nausea and vomiting.   Endocrine: Negative.    Genitourinary: Positive for difficulty urinating. Negative for dysuria and frequency.        Feeling of incomplete emptying   Musculoskeletal: Positive for back pain and gait problem.   Skin: Negative.    Allergic/Immunologic: Negative.    Neurological: Positive for weakness and headaches.   Hematological: Negative.  Negative for adenopathy. Does not " bruise/bleed easily.   Psychiatric/Behavioral: Positive for suicidal ideas. Negative for agitation, behavioral problems and confusion.        Objective    Vital Signs  Temp:  [98.4 °F (36.9 °C)] 98.4 °F (36.9 °C)  Heart Rate:  [] 98  Resp:  [18] 18  BP: (138-146)/(65-74) 146/73  SpO2:  [98 %-100 %] 99 %  on   ;   Device (Oxygen Therapy): room air  Body mass index is 28.03 kg/m².    Physical Exam   Constitutional: She is oriented to person, place, and time. She appears well-developed and well-nourished. She has a sickly appearance. No distress.   HENT:   Head: Normocephalic and atraumatic.   Mouth/Throat: She has dentures (not in place at this time).   Eyes: EOM are normal. Pupils are equal, round, and reactive to light.   Neck: Normal range of motion. Neck supple.   Cardiovascular: Intact distal pulses. Tachycardia present.   Pulmonary/Chest: Effort normal and breath sounds normal. No respiratory distress.   Abdominal: Soft. Bowel sounds are normal. She exhibits distension. There is generalized tenderness. There is no rigidity and no guarding.   Musculoskeletal: She exhibits edema (1+ pitting edema to lower extremities). She exhibits no tenderness.   Decreased ROM left arm due to previous fracture   Neurological: She is alert and oriented to person, place, and time.   Skin: Skin is warm and dry. She is not diaphoretic.   Psychiatric: She has a normal mood and affect. Her behavior is normal. Judgment and thought content normal. She expresses no suicidal plans.   Nursing note and vitals reviewed.      Results Review:   I reviewed the patient's new clinical results including all labs and xrays.    Lab Results (last 24 hours)     Procedure Component Value Units Date/Time    POC Glucose Once [[id number]]  (Abnormal) Collected:  12/13/18 1832    Specimen:  Blood Updated:  12/13/18 1834     Glucose 584 mg/dL     CBC & Differential [[id number]] Collected:  12/13/18 1900    Specimen:  Blood Updated:  12/13/18 1929     Narrative:       The following orders were created for panel order CBC & Differential.  Procedure                               Abnormality         Status                     ---------                               -----------         ------                     CBC Auto Differential[[id number]]        Abnormal            Final result                 Please view results for these tests on the individual orders.    Comprehensive Metabolic Panel [[id number]]  (Abnormal) Collected:  12/13/18 1900    Specimen:  Blood Updated:  12/13/18 1959     Glucose 608 mg/dL      BUN 11 mg/dL      Creatinine 0.87 mg/dL      Sodium 131 mmol/L      Potassium 4.8 mmol/L      Chloride 95 mmol/L      CO2 21.9 mmol/L      Calcium 8.9 mg/dL      Total Protein 6.5 g/dL      Albumin 3.30 g/dL      ALT (SGPT) 31 U/L      AST (SGOT) 39 U/L      Alkaline Phosphatase 247 U/L      Total Bilirubin 0.8 mg/dL      eGFR Non African Amer 67 mL/min/1.73      Globulin 3.2 gm/dL      A/G Ratio 1.0 g/dL      BUN/Creatinine Ratio 12.6     Anion Gap 14.1 mmol/L     Lipase [[id number]]  (Abnormal) Collected:  12/13/18 1900    Specimen:  Blood Updated:  12/13/18 1946     Lipase 8 U/L     CBC Auto Differential [[id number]]  (Abnormal) Collected:  12/13/18 1900    Specimen:  Blood Updated:  12/13/18 1929     WBC 2.88 10*3/mm3      RBC 3.81 10*6/mm3      Hemoglobin 10.1 g/dL      Hematocrit 34.0 %      MCV 89.2 fL      MCH 26.5 pg      MCHC 29.7 g/dL      RDW 17.0 %      RDW-SD 55.7 fl      MPV 10.3 fL      Platelets 99 10*3/mm3      Neutrophil % 72.0 %      Lymphocyte % 12.8 %      Monocyte % 11.8 %      Eosinophil % 2.4 %      Basophil % 0.3 %      Immature Grans % 0.7 %      Neutrophils, Absolute 2.07 10*3/mm3      Lymphocytes, Absolute 0.37 10*3/mm3      Monocytes, Absolute 0.34 10*3/mm3      Eosinophils, Absolute 0.07 10*3/mm3      Basophils, Absolute 0.01 10*3/mm3      Immature Grans, Absolute 0.02 10*3/mm3     Hemoglobin A1c [[id number]]  (Abnormal) Collected:   12/13/18 1900    Specimen:  Blood Updated:  12/13/18 1948     Hemoglobin A1C 9.20 %     Narrative:       Hemoglobin A1C Ranges:    Increased Risk for Diabetes  5.7% to 6.4%  Diabetes                     >= 6.5%  Diabetic Goal                < 7.0%    Ethanol [[id number]] Collected:  12/13/18 1900    Specimen:  Blood Updated:  12/13/18 1946     Ethanol <10 mg/dL      Ethanol % <0.010 %     Ammonia [[id number]]  (Abnormal) Collected:  12/13/18 1903    Specimen:  Blood Updated:  12/13/18 1935     Ammonia 87 umol/L     Urinalysis With Microscopic If Indicated (No Culture) - Urine, Catheter [[id number]]  (Abnormal) Collected:  12/13/18 1915    Specimen:  Urine, Catheter Updated:  12/13/18 1935     Color, UA Yellow     Appearance, UA Clear     pH, UA 6.0     Specific Gravity, UA 1.023     Glucose, UA >=1000 mg/dL (3+)     Ketones, UA Negative     Bilirubin, UA Negative     Blood, UA Negative     Protein, UA Negative     Leuk Esterase, UA Negative     Nitrite, UA Negative     Urobilinogen, UA 0.2 E.U./dL    Narrative:       Urine microscopic not indicated.    Urine Drug Screen - Urine, Catheter [[id number]]  (Abnormal) Collected:  12/13/18 1915    Specimen:  Urine, Catheter Updated:  12/13/18 1958     Amphet/Methamphet, Screen Negative     Barbiturates Screen, Urine Negative     Benzodiazepine Screen, Urine Negative     Cocaine Screen, Urine Negative     Opiate Screen Negative     THC, Screen, Urine Negative     Methadone Screen, Urine Negative     Oxycodone Screen, Urine Positive    Narrative:       Negative Thresholds For Drugs Screened:     Amphetamines               500 ng/ml   Barbiturates               200 ng/ml   Benzodiazepines            100 ng/ml   Cocaine                    300 ng/ml   Methadone                  300 ng/ml   Opiates                    300 ng/ml   Oxycodone                  100 ng/ml   THC                        50 ng/ml    The Normal Value for all drugs tested is negative. This report includes final  unconfirmed screening results to be used for medical treatment purposes only. Unconfirmed results must not be used for non-medical purposes such as employment or legal testing. Clinical consideration should be applied to any drug of abuse test, particulary when unconfirmed results are used.    POC Glucose Once [[id number]]  (Abnormal) Collected:  12/13/18 2036    Specimen:  Blood Updated:  12/13/18 2037     Glucose 499 mg/dL     POC Glucose Once [[id number]]  (Abnormal) Collected:  12/13/18 2159    Specimen:  Blood Updated:  12/13/18 2200     Glucose 453 mg/dL           XR Chest 2 View   Final Result      XR Shoulder 2+ View Left   Final Result      XR Humerus Left   Final Result        Assessment/Plan      Active Hospital Problems    Diagnosis Date Noted   • **Type 2 diabetes mellitus with hyperglycemia, with long-term current use of insulin (CMS/HCC) [E11.65, Z79.4] 12/13/2018   • BRICE (obstructive sleep apnea) [G47.33] 12/13/2018   • Gastroparesis [K31.84] 12/13/2018   • Hyperammonemia (CMS/HCC) [E72.20] 12/13/2018   • Hyponatremia [E87.1] 12/13/2018   • Anemia [D64.9] 12/13/2018   • Hyperlipidemia [E78.5]    • DUKES (nonalcoholic steatohepatitis) [K75.81] 06/14/2016   • Cirrhosis of liver (CMS/HCC) [K74.60] 03/08/2016   • Rheumatoid arthritis (CMS/HCC) [M06.9] 03/08/2016   • Anxiety and depression [F41.9, F32.9] 03/08/2016      Resolved Hospital Problems   No resolved problems to display.     DM2/Nausea/vomiting  -blood glucose as high as 608 in ED tonight but reportedly been very high for several months  -will consult endocrinology to see  -a1c 9.2  -gastroparesis causing chronic nausea/vomiting but taken off reglan, will order PRN zofran  -IVF     Hyperammonemia  -can't take lactulose due to reported drug allergy but no signs of hepatic encephalopathy on exam    BRICE/HLD/RA/DUKES/cirrhosis  -no new complaints, will continue home medications as prescribed    Anxiety/depression  -she does report stopping wellbutrin  and some suicidal thoughts without plan  -will have access to see    VTE Ppx  -SCDs    CODE status  -full    I discussed the patients findings and my recommendations with patient and family.    JOEL Wade  12/13/18  9:50 PM    Addendum: I have reviewed the history and plan as obtained by JOEL Garcia. I have personally examined the patient. My exam confirms her physical findings and I agree with the plan as listed above, with the addition to the following:      This is somewhat of a sad situation.  She has multiple medical comorbidities and doesn't do a very good job of managing these in the outpatient setting.  Essentially she is being admitted because the ER didn't feel comfortable letting her go home.  We'll get her blood sugars under control this evening restart her diabetic medications and consult endocrinology.  I guess they wanted to have her admitted 2 days ago with the patient refused.  We'll see what recommendations they have.  We'll consult her gastroenterologist and get his opinion regarding her gastroparesis but until her diabetes is under better control I suspect that her gastroparesis will continue to be difficult to manage.  She is also on narcotic pain medications which will help matters much either.  I'll consult the access Center to assess her anxiety and depression.  I guess in October we took her off her Wellbutrin due to some issues with her platelets.  She's not been restarted on any antidepressants and she's been unable to get an appointment to see a psychiatrist or a therapist.  Maybe we can help expedite her follow-up.  As far as the left shoulder fractures concerned she has follow-up arranged with orthopedic surgeon.  Imaging here showed redemonstration of the fracture.  I do not have any images to compare this to.  We'll consult an orthopedist to take a look at her shoulder and see if they have any further recommendations.    Jose Morales MD  [facility]  12/13/18  10:23 PM

## 2018-12-14 NOTE — CONSULTS
58 y.o.  Patient Care Team:  Blanca Pitts MD as PCP - General (Internal Medicine)  Blanca Pitts MD as PCP - Claims Attributed  Sukhdeep Mcdowell MD as Consulting Physician (Hematology and Oncology)  Blanca Pitts MD as Referring Physician (Internal Medicine)  Rich Coleman MD as Consulting Physician (Gastroenterology)  Merary Burnett MD as Consulting Physician (Endocrinology)      Chief Complaint   Patient presents with   • Hyperglycemia       HPI     58-year-old white female with a complicated past medical history doing very poorly in terms of her severely uncontrolled type 2 diabetes mellitus, gastroparesis and ITP.  Patient was last seen by me on December 11, 2018.  She presents to the hospital with significantly elevated blood sugars.     Type 2 diabetes mellitus  Diagnosed in her 20s.  Has been on insulin since then.  In April 2018 patient has been started on U-500 insulin due to her severely elevated blood sugars.  Due to her gastroparesis patient's oral intake is extremely variable and as a result she takes her insulin when she eats and does not take her insulin when she is not which further complicates her blood sugar control.  At home she is on U-500 insulin 65 units with each meal, 35 units with snacks along with the sliding scale-5 units for 50 about 1 50 mg/dL range.  She recently went to [place] supposed to stay only for a week but the stay was complicated with her feeling sick due to gastroparesis flareup, sustained a fall, when she slipped and fell in dark. She broke her collarbone and the tip of her humerus.  Which is currently in a sling and per pt cannot operated.   Being seen by orthopedic here in hospital.   She does not check her blood sugars when she is not eating.  Average blood sugars at around 300s range.  Lowest blood sugar was 100 mg/dL range.  Last eye examination was in January 2018 and no history of diabetic retinopathy.  Does have history of diabetic neuropathy and is on  Lyrica 75 mg twice daily.  Diet is extremely limited due to her history of gastroparesis and also the fact that she cannot have her dentures in.      Tried DEXCOM - cgm for the pt which could monitor her BG trends and as a result we could be aggressive with her BG control but that wasnt covered by her insurance.      Pancreatic divisum : No hx of pancreatitis per pt.       Liver disease/DUKES -  Sees Dr. Jordan from [facility].       Gastroparesis - Sees Dr. Coleman. Getting worse.  Not a candidate for stimulator due to ITP      ITP - diagnosed in May 2018. Currently stable. Seen by Hematology and oncology.        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   • Cirrhosis (CMS/HCC)    • Colon polyps    • Deafness    • Depression    • Diabetes mellitus (CMS/HCC)     Adult onset, insulin requiring   • Duodenal ulcer with hemorrhage    • Esophageal varices determined by endoscopy (CMS/HCC)    • Fibromyalgia    • Gallbladder disease    • Gastric varices    • Gastroparesis    • Glaucoma    • Granuloma annulare    • H/O B12 deficiency    • H/O Bleeding ulcer     And gastroesophageal varices   • H/O mixed connective tissue disorder    • Hemorrhoids    • Hiatal hernia    • History of transfusion    • Hx of being hospitalized     In [place] for GI bleeding from ulcer and varices   • Hyperlipidemia    • Migraine    • DUKES (nonalcoholic steatohepatitis) 11/2016   • BRICE (obstructive sleep apnea)    • Pancreas divisum    • Pancytopenia (CMS/HCC)     Chronic, due to cirrhosis and hypersplenism   • Peptic ulcer disease     And esophageal varices   • PONV (postoperative nausea and vomiting)    • RA (rheumatoid arthritis) (CMS/HCC)    • Seizure disorder (CMS/HCC)     LAST ONE SEVERAL YEARS AGO   • Seizures (CMS/HCC)    •  "Systemic lupus (CMS/HCC)        Family History   Problem Relation Age of Onset   • Liver disease Other    • Depression Other    • Heart disease Other    • Hypertension Other    • Diabetes Other    • Breast cancer Other    • Brain cancer Other    • Uterine cancer Other    • Colon cancer Other    • Leukemia Other    • Colon cancer Maternal Aunt    • Hypertension Mother    • Diabetes type II Mother    • Rheum arthritis Mother    • Liver disease Mother         DUKES   • Heart disease Father    • Diabetes type II Father    • Diabetes type II Sister    • Lupus Sister    • Malig Hyperthermia Neg Hx        Social History     Socioeconomic History   • Marital status:      Spouse name: Jose   • Number of children: Not on file   • Years of education: Not on file   • Highest education level: Not on file   Social Needs   • Financial resource strain: Not on file   • Food insecurity - worry: Not on file   • Food insecurity - inability: Not on file   • Transportation needs - medical: Not on file   • Transportation needs - non-medical: Not on file   Occupational History     Employer: DISABLED   Tobacco Use   • Smoking status: Former Smoker     Packs/day: 0.00     Years: 0.00     Pack years: 0.00     Last attempt to quit: 2015     Years since quittin.9   • Smokeless tobacco: Never Used   Substance and Sexual Activity   • Alcohol use: No   • Drug use: No   • Sexual activity: Defer   Other Topics Concern   • Not on file   Social History Narrative    Moved to [place] from [place]. She is currently medically disabled.       Allergies   Allergen Reactions   • Albuterol Anaphylaxis   • Tramadol Nausea Only, Other (See Comments) and GI Intolerance     Per pt causes her \"palsy, meaning shaking and tremors\"    • Lactulose Nausea And Vomiting and Other (See Comments)     Severe abdominal pain   • Quinine Derivatives Nausea And Vomiting         Current Facility-Administered Medications:   •  ALPRAZolam (XANAX) tablet 0.5 " mg, 0.5 mg, Oral, BID PRN, Jose Morales MD  •  amitriptyline (ELAVIL) tablet 50 mg, 50 mg, Oral, Nightly, Karlene Del Cid, APRN, 50 mg at 12/14/18 0126  •  cyclobenzaprine (FLEXERIL) tablet 5 mg, 5 mg, Oral, Nightly, Karlene Del Cid E, APRN  •  dextrose (D50W) 25 g/ 50mL Intravenous Solution 25 g, 25 g, Intravenous, Q15 Min PRN, Karlene Del Cid, APRN  •  dextrose (GLUTOSE) oral gel 15 g, 15 g, Oral, Q15 Min PRN, Karlene Del Cid, APRN  •  DULoxetine (CYMBALTA) DR capsule 90 mg, 90 mg, Oral, Daily, Karlene Del Cid, APRN, 90 mg at 12/14/18 0900  •  folic acid (FOLVITE) tablet 1 mg, 1 mg, Oral, Daily, Karlene Del Cid, APRN, 1 mg at 12/14/18 0900  •  furosemide (LASIX) tablet 40 mg, 40 mg, Oral, Daily, Karlene Del Cid, APRN, 40 mg at 12/14/18 0900  •  glucagon (human recombinant) (GLUCAGEN DIAGNOSTIC) injection 1 mg, 1 mg, Subcutaneous, PRN, Karlene Del Cid, APRN  •  hydrOXYzine (ATARAX) tablet 50 mg, 50 mg, Oral, TID PRN, Karlene Del Cid, APRN  •  insulin glargine (LANTUS) injection 50 Units, 50 Units, Subcutaneous, Nightly, Merary Burnett MD  •  insulin lispro (humaLOG) injection 0-24 Units, 0-24 Units, Subcutaneous, 4x Daily With Meals & Nightly, Karlene Del Cid APRN, 8 Units at 12/14/18 0854  •  insulin lispro (humaLOG) injection 10 Units, 10 Units, Subcutaneous, TID With Meals, Merary Burnett MD  •  levETIRAcetam (KEPPRA) tablet 500 mg, 500 mg, Oral, Q12H, Karlene Del Cid, APRN, 500 mg at 12/14/18 0900  •  multivitamin with minerals 1 tablet, 1 tablet, Oral, Daily, Karlene Del Cid APRN, 1 tablet at 12/14/18 0900  •  ondansetron (ZOFRAN) tablet 4 mg, 4 mg, Oral, Q6H PRN **OR** ondansetron ODT (ZOFRAN-ODT) disintegrating tablet 4 mg, 4 mg, Oral, Q6H PRN **OR** ondansetron (ZOFRAN) injection 4 mg, 4 mg, Intravenous, Q6H PRN, Karlene Del Cid, APRN  •  oxyCODONE (ROXICODONE) immediate release tablet 7.5 mg, 7.5 mg, Oral, Q8H PRN, Jose Morales MD, 7.5 mg at 12/14/18 0644  •  pantoprazole (PROTONIX) EC tablet 40 mg,  40 mg, Oral, QAM, Karlene Del Cid, APRN, 40 mg at 12/14/18 0647  •  pregabalin (LYRICA) capsule 75 mg, 75 mg, Oral, Q12H, Jose Morales MD, 75 mg at 12/14/18 0637  •  promethazine (PHENERGAN) tablet 25 mg, 25 mg, Oral, Q6H PRN, Karlene Del Cid E, APRN, 25 mg at 12/14/18 0126  •  rifaximin (XIFAXAN) tablet 550 mg, 550 mg, Oral, BID, DorchesterKarlene cabrera E, APRN, 550 mg at 12/14/18 0908  •  sodium chloride 0.9 % flush 1-10 mL, 1-10 mL, Intravenous, PRN, Karlene Del Cid E, APRN  •  [COMPLETED] Insert peripheral IV, , , Once **AND** sodium chloride 0.9 % flush 10 mL, 10 mL, Intravenous, PRN, Anisha East, APRN, 10 mL at 12/14/18 0129  •  sodium chloride 0.9 % flush 3 mL, 3 mL, Intravenous, Q12H, Karlene Del Cid E, APRN, 3 mL at 12/14/18 0137  •  spironolactone (ALDACTONE) tablet 100 mg, 100 mg, Oral, Daily, Karlene Del Cid E, APRN, 100 mg at 12/14/18 0900  •  sucralfate (CARAFATE) tablet 1 g, 1 g, Oral, BID, Karlene Del Cid E, APRN, 1 g at 12/14/18 0900  •  trimethobenzamide (TIGAN) capsule 300 mg, 300 mg, Oral, TID, Karlene Del Cid E, APRN, 300 mg at 12/14/18 0900  •  vitamin B-12 (CYANOCOBALAMIN) tablet 1,000 mcg, 1,000 mcg, Oral, Daily, Karlene Del Cid E, APRN, 1,000 mcg at 12/14/18 0900         Review of Systems   Constitutional: Positive for appetite change and fatigue. Negative for fever.   Eyes: Negative for visual disturbance.   Respiratory: Negative for shortness of breath.    Cardiovascular: Negative for palpitations and leg swelling.   Gastrointestinal: Positive for abdominal pain and nausea. Negative for vomiting.   Endocrine: Positive for polyuria.   Musculoskeletal: Negative for joint swelling and neck pain.   Skin: Negative for rash.   Neurological: Positive for weakness. Negative for numbness.   Psychiatric/Behavioral: Negative for behavioral problems.     Objective     Vital Signs  Temp:  [98.4 °F (36.9 °C)-98.7 °F (37.1 °C)] 98.4 °F (36.9 °C)  Heart Rate:  [] 102  Resp:  [16-20] 16  BP: (138-151)/(65-80)  143/68    Physical Exam  Physical Exam   Constitutional: She is oriented to person, place, and time. She appears well-nourished.   HENT:   Head: Normocephalic and atraumatic.   No teeth   Eyes: Conjunctivae and EOM are normal. No scleral icterus.   Neck: Normal range of motion. Neck supple. No thyromegaly present.   Cardiovascular: Normal rate and normal heart sounds. Exam reveals no friction rub.   No murmur heard.  Pulmonary/Chest: Effort normal and breath sounds normal. No stridor. She has no wheezes. She has no rales.   Abdominal: Soft. Bowel sounds are normal. She exhibits distension. There is no tenderness.   Musculoskeletal: She exhibits tenderness and deformity. She exhibits no edema.   Left shoulder in sling   Lymphadenopathy:     She has no cervical adenopathy.   Neurological: She is alert and oriented to person, place, and time.   Skin: Skin is warm and dry. She is not diaphoretic.   Psychiatric: She has a normal mood and affect.   Vitals reviewed.      Results Review:    I reviewed the patient's new clinical results and summarized them in the HPI and in plan.  Glucose   Date/Time Value Ref Range Status   12/14/2018 1100 213 (H) 70 - 130 mg/dL Final   12/14/2018 0606 217 (H) 70 - 130 mg/dL Final   12/14/2018 0147 381 (H) 70 - 130 mg/dL Final   12/13/2018 2159 453 (C) 70 - 130 mg/dL Final   12/13/2018 2036 499 (C) 70 - 130 mg/dL Final   12/13/2018 1832 584 (C) 70 - 130 mg/dL Final     Lab Results (last 24 hours)     Procedure Component Value Units Date/Time    POC Glucose Once [[id number]]  (Abnormal) Collected:  12/14/18 1100    Specimen:  Blood Updated:  12/14/18 1102     Glucose 213 mg/dL     POC Glucose Once [[id number]]  (Abnormal) Collected:  12/14/18 0606    Specimen:  Blood Updated:  12/14/18 0609     Glucose 217 mg/dL     Comprehensive Metabolic Panel [[id number]]  (Abnormal) Collected:  12/14/18 0448    Specimen:  Blood Updated:  12/14/18 0603     Glucose 225 mg/dL      BUN 9 mg/dL      Creatinine  0.61 mg/dL      Sodium 135 mmol/L      Potassium 3.8 mmol/L      Chloride 99 mmol/L      CO2 22.5 mmol/L      Calcium 9.0 mg/dL      Total Protein 6.2 g/dL      Albumin 3.10 g/dL      ALT (SGPT) 25 U/L      AST (SGOT) 34 U/L      Alkaline Phosphatase 215 U/L      Total Bilirubin 0.7 mg/dL      eGFR Non African Amer 101 mL/min/1.73      Globulin 3.1 gm/dL      A/G Ratio 1.0 g/dL      BUN/Creatinine Ratio 14.8     Anion Gap 13.5 mmol/L     CBC & Differential [[id number]] Collected:  12/14/18 0448    Specimen:  Blood Updated:  12/14/18 0554    Narrative:       The following orders were created for panel order CBC & Differential.  Procedure                               Abnormality         Status                     ---------                               -----------         ------                     CBC Auto Differential[[id number]]        Abnormal            Final result                 Please view results for these tests on the individual orders.    CBC Auto Differential [[id number]]  (Abnormal) Collected:  12/14/18 0448    Specimen:  Blood Updated:  12/14/18 0554     WBC 3.15 10*3/mm3      RBC 3.64 10*6/mm3      Hemoglobin 9.6 g/dL      Hematocrit 30.4 %      MCV 83.5 fL      MCH 26.4 pg      MCHC 31.6 g/dL      RDW 17.0 %      RDW-SD 52.3 fl      MPV 10.2 fL      Platelets 100 10*3/mm3      Neutrophil % 61.5 %      Lymphocyte % 21.0 %      Monocyte % 13.7 %      Eosinophil % 3.2 %      Basophil % 0.6 %      Immature Grans % 1.0 %      Neutrophils, Absolute 1.94 10*3/mm3      Lymphocytes, Absolute 0.66 10*3/mm3      Monocytes, Absolute 0.43 10*3/mm3      Eosinophils, Absolute 0.10 10*3/mm3      Basophils, Absolute 0.02 10*3/mm3      Immature Grans, Absolute 0.03 10*3/mm3     POC Glucose Once [[id number]]  (Abnormal) Collected:  12/14/18 0147    Specimen:  Blood Updated:  12/14/18 0158     Glucose 381 mg/dL     POC Glucose Once [[id number]]  (Abnormal) Collected:  12/13/18 2159    Specimen:  Blood Updated:  12/13/18 2200      Glucose 453 mg/dL     POC Glucose Once [[id number]]  (Abnormal) Collected:  12/13/18 2036    Specimen:  Blood Updated:  12/13/18 2037     Glucose 499 mg/dL     [place] Draw [[id number]] Collected:  12/13/18 1900    Specimen:  Blood Updated:  12/13/18 2016    Narrative:       The following orders were created for panel order [place] Draw.  Procedure                               Abnormality         Status                     ---------                               -----------         ------                     Light Blue Top[[id number]]                                   Final result               Green Top (Gel)[[id number]]                                  Final result               Lavender Top[[id number]]                                     Final result               Gold Top - SST[[id number]]                                   Final result                 Please view results for these tests on the individual orders.    Green Top (Gel) [[id number]] Collected:  12/13/18 1900    Specimen:  Blood Updated:  12/13/18 2016     Extra Tube Hold for add-ons.     Comment: Auto resulted.       Lavender Top [[id number]] Collected:  12/13/18 1900    Specimen:  Blood Updated:  12/13/18 2016     Extra Tube hold for add-on     Comment: Auto resulted       Gold Top - SST [[id number]] Collected:  12/13/18 1900    Specimen:  Blood Updated:  12/13/18 2016     Extra Tube Hold for add-ons.     Comment: Auto resulted.       Light Blue Top [[id number]] Collected:  12/13/18 1900    Specimen:  Blood Updated:  12/13/18 2016     Extra Tube hold for add-on     Comment: Auto resulted       Comprehensive Metabolic Panel [[id number]]  (Abnormal) Collected:  12/13/18 1900    Specimen:  Blood Updated:  12/13/18 1959     Glucose 608 mg/dL      BUN 11 mg/dL      Creatinine 0.87 mg/dL      Sodium 131 mmol/L      Potassium 4.8 mmol/L      Chloride 95 mmol/L      CO2 21.9 mmol/L      Calcium 8.9 mg/dL      Total Protein 6.5 g/dL      Albumin 3.30 g/dL      ALT  (SGPT) 31 U/L      AST (SGOT) 39 U/L      Alkaline Phosphatase 247 U/L      Total Bilirubin 0.8 mg/dL      eGFR Non African Amer 67 mL/min/1.73      Globulin 3.2 gm/dL      A/G Ratio 1.0 g/dL      BUN/Creatinine Ratio 12.6     Anion Gap 14.1 mmol/L     Urine Drug Screen - Urine, Catheter [[id number]]  (Abnormal) Collected:  12/13/18 1915    Specimen:  Urine, Catheter Updated:  12/13/18 1958     Amphet/Methamphet, Screen Negative     Barbiturates Screen, Urine Negative     Benzodiazepine Screen, Urine Negative     Cocaine Screen, Urine Negative     Opiate Screen Negative     THC, Screen, Urine Negative     Methadone Screen, Urine Negative     Oxycodone Screen, Urine Positive    Narrative:       Negative Thresholds For Drugs Screened:     Amphetamines               500 ng/ml   Barbiturates               200 ng/ml   Benzodiazepines            100 ng/ml   Cocaine                    300 ng/ml   Methadone                  300 ng/ml   Opiates                    300 ng/ml   Oxycodone                  100 ng/ml   THC                        50 ng/ml    The Normal Value for all drugs tested is negative. This report includes final unconfirmed screening results to be used for medical treatment purposes only. Unconfirmed results must not be used for non-medical purposes such as employment or legal testing. Clinical consideration should be applied to any drug of abuse test, particulary when unconfirmed results are used.    Hemoglobin A1c [[id number]]  (Abnormal) Collected:  12/13/18 1900    Specimen:  Blood Updated:  12/13/18 1948     Hemoglobin A1C 9.20 %     Narrative:       Hemoglobin A1C Ranges:    Increased Risk for Diabetes  5.7% to 6.4%  Diabetes                     >= 6.5%  Diabetic Goal                < 7.0%    Lipase [[id number]]  (Abnormal) Collected:  12/13/18 1900    Specimen:  Blood Updated:  12/13/18 1946     Lipase 8 U/L     Ethanol [[id number]] Collected:  12/13/18 1900    Specimen:  Blood Updated:  12/13/18 1946      Ethanol <10 mg/dL      Ethanol % <0.010 %     Ammonia [[id number]]  (Abnormal) Collected:  12/13/18 1903    Specimen:  Blood Updated:  12/13/18 1935     Ammonia 87 umol/L     Urinalysis With Microscopic If Indicated (No Culture) - Urine, Catheter [[id number]]  (Abnormal) Collected:  12/13/18 1915    Specimen:  Urine, Catheter Updated:  12/13/18 1935     Color, UA Yellow     Appearance, UA Clear     pH, UA 6.0     Specific Gravity, UA 1.023     Glucose, UA >=1000 mg/dL (3+)     Ketones, UA Negative     Bilirubin, UA Negative     Blood, UA Negative     Protein, UA Negative     Leuk Esterase, UA Negative     Nitrite, UA Negative     Urobilinogen, UA 0.2 E.U./dL    Narrative:       Urine microscopic not indicated.    CBC & Differential [[id number]] Collected:  12/13/18 1900    Specimen:  Blood Updated:  12/13/18 1929    Narrative:       The following orders were created for panel order CBC & Differential.  Procedure                               Abnormality         Status                     ---------                               -----------         ------                     CBC Auto Differential[[id number]]        Abnormal            Final result                 Please view results for these tests on the individual orders.    CBC Auto Differential [[id number]]  (Abnormal) Collected:  12/13/18 1900    Specimen:  Blood Updated:  12/13/18 1929     WBC 2.88 10*3/mm3      RBC 3.81 10*6/mm3      Hemoglobin 10.1 g/dL      Hematocrit 34.0 %      MCV 89.2 fL      MCH 26.5 pg      MCHC 29.7 g/dL      RDW 17.0 %      RDW-SD 55.7 fl      MPV 10.3 fL      Platelets 99 10*3/mm3      Neutrophil % 72.0 %      Lymphocyte % 12.8 %      Monocyte % 11.8 %      Eosinophil % 2.4 %      Basophil % 0.3 %      Immature Grans % 0.7 %      Neutrophils, Absolute 2.07 10*3/mm3      Lymphocytes, Absolute 0.37 10*3/mm3      Monocytes, Absolute 0.34 10*3/mm3      Eosinophils, Absolute 0.07 10*3/mm3      Basophils, Absolute 0.01 10*3/mm3      Immature  Grans, Absolute 0.02 10*3/mm3     POC Glucose Once [[id number]]  (Abnormal) Collected:  12/13/18 1832    Specimen:  Blood Updated:  12/13/18 1834     Glucose 584 mg/dL           Imaging Results (last 24 hours)     Procedure Component Value Units Date/Time    XR Chest 2 View [[id number]] Collected:  12/13/18 1958     Updated:  12/13/18 2006    Narrative:       XR CHEST 2 VW-, XR HUMERUS LEFT-, XR SHOULDER 2+ VW LEFT-     Clinical: Fell 4 weeks ago, recent fall, multifocal pain     COMPARISON chest radiograph 10/2/2018     Chest findings: There is a left humerus fracture. The left ribs overall  are satisfactory in appearance.     Cardiac size upper normal. Mediastinum and anaid are satisfactory in  appearance. No pneumothorax, pleural effusion, vascular congestion nor  active airspace disease demonstrated.     CONCLUSION: No active cardiovascular or pulmonary process is  demonstrated. Left humeral fracture.     Left shoulder left humerus findings: There is a fracture demonstrated  through the surgical neck of the left humeral head. There is rotation of  the humeral head however no dislocation. Superior displacement of the  shaft. At the site of fracture the bone demonstrates a mottled  appearance, potential pathologic fracture. The acromioclavicular  alignment is satisfactory. No fracture of the scapula or adjacent ribs.  Overlying soft tissues are satisfactory in appearance. Mid and distal  humerus are normal in appearance.     CONCLUSION: Fracture through the surgical neck of the left humeral head,  potential pathologic fracture.     This report was finalized on 12/13/2018 8:03 PM by Dr. Francisco Dela Cruz M.D.       XR Shoulder 2+ View Left [[id number]] Collected:  12/13/18 1958     Updated:  12/13/18 2006    Narrative:       XR CHEST 2 VW-, XR HUMERUS LEFT-, XR SHOULDER 2+ VW LEFT-     Clinical: Fell 4 weeks ago, recent fall, multifocal pain     COMPARISON chest radiograph 10/2/2018     Chest findings: There is a left  humerus fracture. The left ribs overall  are satisfactory in appearance.     Cardiac size upper normal. Mediastinum and anaid are satisfactory in  appearance. No pneumothorax, pleural effusion, vascular congestion nor  active airspace disease demonstrated.     CONCLUSION: No active cardiovascular or pulmonary process is  demonstrated. Left humeral fracture.     Left shoulder left humerus findings: There is a fracture demonstrated  through the surgical neck of the left humeral head. There is rotation of  the humeral head however no dislocation. Superior displacement of the  shaft. At the site of fracture the bone demonstrates a mottled  appearance, potential pathologic fracture. The acromioclavicular  alignment is satisfactory. No fracture of the scapula or adjacent ribs.  Overlying soft tissues are satisfactory in appearance. Mid and distal  humerus are normal in appearance.     CONCLUSION: Fracture through the surgical neck of the left humeral head,  potential pathologic fracture.     This report was finalized on 12/13/2018 8:03 PM by Dr. Francisco Dela Cruz M.D.       XR Humerus Left [[id number]] Collected:  12/13/18 1958     Updated:  12/13/18 2006    Narrative:       XR CHEST 2 VW-, XR HUMERUS LEFT-, XR SHOULDER 2+ VW LEFT-     Clinical: Fell 4 weeks ago, recent fall, multifocal pain     COMPARISON chest radiograph 10/2/2018     Chest findings: There is a left humerus fracture. The left ribs overall  are satisfactory in appearance.     Cardiac size upper normal. Mediastinum and anaid are satisfactory in  appearance. No pneumothorax, pleural effusion, vascular congestion nor  active airspace disease demonstrated.     CONCLUSION: No active cardiovascular or pulmonary process is  demonstrated. Left humeral fracture.     Left shoulder left humerus findings: There is a fracture demonstrated  through the surgical neck of the left humeral head. There is rotation of  the humeral head however no dislocation. Superior  displacement of the  shaft. At the site of fracture the bone demonstrates a mottled  appearance, potential pathologic fracture. The acromioclavicular  alignment is satisfactory. No fracture of the scapula or adjacent ribs.  Overlying soft tissues are satisfactory in appearance. Mid and distal  humerus are normal in appearance.     CONCLUSION: Fracture through the surgical neck of the left humeral head,  potential pathologic fracture.     This report was finalized on 12/13/2018 8:03 PM by Dr. Francisco Dela Cruz M.D.             Assessment/Plan     Active Hospital Problems    Diagnosis Date Noted   • **Type 2 diabetes mellitus with hyperglycemia, with long-term current use of insulin (CMS/HCC) [E11.65, Z79.4] 12/13/2018   • BRICE (obstructive sleep apnea) [G47.33] 12/13/2018   • Gastroparesis [K31.84] 12/13/2018   • Hyperammonemia (CMS/HCC) [E72.20] 12/13/2018   • Hyponatremia [E87.1] 12/13/2018   • Anemia [D64.9] 12/13/2018   • Thrombocytopenia (CMS/HCC) [D69.6] 05/16/2018   • Hyperlipidemia [E78.5]    • DUKES (nonalcoholic steatohepatitis) [K75.81] 06/14/2016   • Cirrhosis of liver (CMS/HCC) [K74.60] 03/08/2016   • Rheumatoid arthritis (CMS/HCC) [M06.9] 03/08/2016   • Anxiety and depression [F41.9, F32.9] 03/08/2016      Resolved Hospital Problems   No resolved problems to display.     Type 2 diabetes mellitus-severely uncontrolled, complicated with gastroparesis  Will stop  Insulin U-500 while the patient is in the hospital.  Will start patient on levemir 50 units at bedtime  Will cover with Humalog 10 units with each meal  Will cover with high dose Humalog sliding scale 3 times a day before meals and at bedtime.    Patient's variable by mouth intake due to gastroparesis and not able to have her dentures in makes it extremely challenging to control her blood sugars.      Gastroparesis  Complicating the control of her diabetes.    Placed continues glucose monitoring-free style jesusita to help patient monitor her blood  "sugars and to continue to take her U-500 insulin at home, this way at least she is getting insulin for her elevated blood sugars.    Discussed plan with nurse.     Thank you for your consultation.  Will follow the pt while in hospital.     Merary Burnett MD.  12/14/18  11:25 AM      EMR Dragon / transcription disclaimer:    \"Dictated utilizing Dragon dictation\".   "

## 2018-12-14 NOTE — PROGRESS NOTES
[facility]     LOS: 0 days     Subjective:    CC:Hyperglycemia    DIET:  Diet Order   Procedures   • Diet Regular; Cardiac, Consistent Carbohydrate     She has fallen twice in last couple days    No chest pain   Up to the bathroom   Every once in a while gets soa thought be from abdominal swelling     Objective:    Vital Signs:  Temp:  [98.4 °F (36.9 °C)-98.8 °F (37.1 °C)] 98.8 °F (37.1 °C)  Heart Rate:  [] 107  Resp:  [16-20] 16  BP: (131-151)/(65-82) 131/82    SpO2:  [97 %-100 %] 97 %  on   ;   Device (Oxygen Therapy): room air  Body mass index is 29.94 kg/m².    Physical Exam   Constitutional: She appears well-developed and well-nourished.   HENT:   Head: Normocephalic and atraumatic.   Cardiovascular: Normal rate and regular rhythm.   No murmur heard.  Pulmonary/Chest: Effort normal and breath sounds normal.   Abdominal: Soft. Bowel sounds are normal. She exhibits no distension. There is no tenderness.   Musculoskeletal:   Left arm in a sling   Neurological: She is alert.   Skin: Skin is warm and dry.       Results Review:    Glucose   Date Value Ref Range Status   12/14/2018 225 (H) 65 - 99 mg/dL Final   12/13/2018 608 (C) 65 - 99 mg/dL Final     Results from last 7 days   Lab Units  12/14/18   0448   WBC 10*3/mm3  3.15*   HEMOGLOBIN g/dL  9.6*   HEMATOCRIT %  30.4*   PLATELETS 10*3/mm3  100*     Results from last 7 days   Lab Units  12/14/18   0448   SODIUM mmol/L  135*   POTASSIUM mmol/L  3.8   CHLORIDE mmol/L  99   CO2 mmol/L  22.5   BUN mg/dL  9   CREATININE mg/dL  0.61   CALCIUM mg/dL  9.0   BILIRUBIN mg/dL  0.7   ALK PHOS U/L  215*   ALT (SGPT) U/L  25   AST (SGOT) U/L  34*   GLUCOSE mg/dL  225*                 Cultures:       I have reviewed daily medications and changes in CPOE    Scheduled meds    amitriptyline 50 mg Oral Nightly   cyclobenzaprine 5 mg Oral Nightly   DULoxetine 90 mg Oral Daily   folic acid 1 mg Oral Daily   furosemide 40 mg Oral Daily   insulin  glargine 50 Units Subcutaneous Nightly   insulin lispro 0-24 Units Subcutaneous 4x Daily With Meals & Nightly   insulin lispro 10 Units Subcutaneous TID With Meals   levETIRAcetam 500 mg Oral Q12H   multivitamin with minerals 1 tablet Oral Daily   pantoprazole 40 mg Oral QAM   pregabalin 75 mg Oral Q12H   rifaximin 550 mg Oral BID   sodium chloride 3 mL Intravenous Q12H   spironolactone 100 mg Oral Daily   sucralfate 1 g Oral BID   trimethobenzamide 300 mg Oral TID   vitamin B-12 1,000 mcg Oral Daily          PRN meds  •  ALPRAZolam  •  dextrose  •  dextrose  •  glucagon (human recombinant)  •  hydrOXYzine  •  ondansetron **OR** ondansetron ODT **OR** ondansetron  •  oxyCODONE  •  promethazine  •  sodium chloride  •  [COMPLETED] Insert peripheral IV **AND** sodium chloride        Type 2 diabetes mellitus with hyperglycemia, with long-term current use of insulin (CMS/HCC)    Thrombocytopenia (CMS/HCC)    Cirrhosis of liver (CMS/HCC)    Rheumatoid arthritis (CMS/HCC)    Anxiety and depression    DUKES (nonalcoholic steatohepatitis)    Hyperlipidemia    BRICE (obstructive sleep apnea)    Gastroparesis    Hyperammonemia (CMS/HCC)    Hyponatremia    Anemia        Assessment/Plan:    Type 2 diabetes mellitus with hyperglycemia, with long-term current use of insulin (CMS/HCC)      Thrombocytopenia (CMS/HCC)      Cirrhosis of liver (CMS/HCC)      Rheumatoid arthritis (CMS/HCC)      Anxiety and depression      DUKES (nonalcoholic steatohepatitis)      Hyperlipidemia      BRICE (obstructive sleep apnea)      Gastroparesis      Hyperammonemia (CMS/HCC)      Hyponatremia      Anemia    Plan:  Consult to access, GI, endo, and ortho.    >25 minutes spent, > 1/2 time spent counseling and coordination of care     Jose Almanza MD  12/14/18  5:14 PM            Moving left hand in all directions

## 2018-12-14 NOTE — ED NOTES
"Anisha East NP made aware of Pts suicidal thoughts and is at bedside. Pt states, \" I don't think that I would ever do it. I just have so much going on and am sick and tired or being sick and tired.\" Anisha East NP does not want Pt on a hold or to have a sitter. Pt made aware that if she starts having any thoughts while hear, to notify staff immediately. Pt verbalizes understanding. Pts  is also at bedside.     Izabel Bowen, RN  12/13/18 1930    "

## 2018-12-14 NOTE — PROGRESS NOTES
Discharge Planning Assessment  [facility]     Patient Name: Silvia Zabala  MRN: [id number]  Today's Date: 12/14/2018    Admit Date: 12/13/2018    Discharge Needs Assessment     Row Name 12/14/18 1713       Living Environment    Lives With  other relative(s);spouse    Current Living Arrangements  home/apartment/condo    Primary Care Provided by  self    Provides Primary Care For  no one, unable/limited ability to care for self    Family Caregiver if Needed  spouse;other relative(s)    Quality of Family Relationships  helpful;involved    Able to Return to Prior Arrangements  yes       Resource/Environmental Concerns    Transportation Concerns  car, none       Transition Planning    Patient/Family Anticipates Transition to  home with family    Transportation Anticipated  family or friend will provide       Discharge Needs Assessment    Readmission Within the Last 30 Days  no previous admission in last 30 days    Concerns to be Addressed  basic needs    Equipment Currently Used at Home  cane, straight    Equipment Needed After Discharge  none        Discharge Plan     Row Name 12/14/18 3336       Plan    Plan  Home with spouse and mother n law    Patient/Family in Agreement with Plan  yes    Plan Comments  Spoke with pt and her spouse, Jose at bedside. Role of CCP explained. Facesheet info verified. Pharmacy verified. Pt denies having a Living Will or POA. Pt denies any issues affording their medications or remembering to take them. Pt lives with her spouse and mother in law in a S/S apt with 5 steps to enter. Pt states that every since she feel and broke her arm she has needed assistance with her ADL's. Pts family provides transportation and she was just approved last week for [employer]. Pt states she has a walker at home if needed. Pt denies any history of HH or PIPPA. CCP consult noted. Pt states she only falls when her ammonia levels are elevated. Explained the importance of recognizing s/s of when her ammonia  levels are high so she is aware that she is unsafe to walk without assistance to prevent falling. Pt agreeable. Pt plans to return home at [place] with her family and denies any discharge planning needs. CCP to follow. JChasteenRN/CCP          Destination      No service coordination in this encounter.      Durable Medical Equipment      No service coordination in this encounter.      Dialysis/Infusion      No service coordination in this encounter.      Home Medical Care      No service coordination in this encounter.      Community Resources      No service coordination in this encounter.          Demographic Summary    No documentation.       Functional Status    No documentation.       Psychosocial    No documentation.       Abuse/Neglect    No documentation.       Legal    No documentation.       Substance Abuse    No documentation.       Patient Forms    No documentation.           Sujata Velasquez RN

## 2018-12-14 NOTE — PLAN OF CARE
Problem: Patient Care Overview  Goal: Plan of Care Review  Outcome: Ongoing (interventions implemented as appropriate)   12/14/18 9583   Coping/Psychosocial   Plan of Care Reviewed With patient   OTHER   Outcome Summary Blood sugars improving. Insulin adjusted per endocrine. Med prn for c/o pain and nausea. Cont to monitor.   Plan of Care Review   Progress improving       Problem: Fall Risk (Adult)  Goal: Identify Related Risk Factors and Signs and Symptoms  Outcome: Outcome(s) achieved Date Met: 12/14/18    Goal: Absence of Fall  Outcome: Ongoing (interventions implemented as appropriate)      Problem: Suicide Risk (Adult)  Goal: Identify Related Risk Factors and Signs and Symptoms  Outcome: Outcome(s) achieved Date Met: 12/14/18    Goal: Strength-Based Wellness/Recovery  Outcome: Outcome(s) achieved Date Met: 12/14/18    Goal: Physical Safety  Outcome: Outcome(s) achieved Date Met: 12/14/18      Problem: Skin Injury Risk (Adult)  Goal: Identify Related Risk Factors and Signs and Symptoms  Outcome: Outcome(s) achieved Date Met: 12/14/18    Goal: Skin Health and Integrity  Outcome: Ongoing (interventions implemented as appropriate)      Problem: Hyperglycemia, Persistent (Adult)  Goal: Signs and Symptoms of Listed Potential Problems Will be Absent, Minimized or Managed (Hyperglycemia, Persistent)  Outcome: Ongoing (interventions implemented as appropriate)

## 2018-12-14 NOTE — PROGRESS NOTES
Clinical Pharmacy Services: Medication History    Silvia Zabala is a 58 y.o. female presenting to [facility] for   Chief Complaint   Patient presents with   • Hyperglycemia       She  has a past medical history of Anemia, Anxiety, Arthritis, Broken shoulder, Chromosomal abnormality, Cirrhosis (CMS/HCC), Colon polyps, Deafness, Depression, Diabetes mellitus (CMS/HCC), Duodenal ulcer with hemorrhage, Esophageal varices determined by endoscopy (CMS/HCC), Fibromyalgia, Gallbladder disease, Gastric varices, Gastroparesis, Glaucoma, Granuloma annulare, H/O B12 deficiency, H/O Bleeding ulcer, H/O mixed connective tissue disorder, Hemorrhoids, Hiatal hernia, History of transfusion, being hospitalized, Hyperlipidemia, Migraine, DUKES (nonalcoholic steatohepatitis) (11/2016), BRICE (obstructive sleep apnea), Pancreas divisum, Pancytopenia (CMS/HCC), Peptic ulcer disease, PONV (postoperative nausea and vomiting), RA (rheumatoid arthritis) (CMS/HCC), Seizure disorder (CMS/HCC), Seizures (CMS/HCC), and Systemic lupus (CMS/[facility]).    Allergies as of 12/13/2018 - Reviewed 12/13/2018   Allergen Reaction Noted   • Albuterol Anaphylaxis 02/22/2016   • Tramadol Nausea Only, Other (See Comments), and GI Intolerance 03/08/2016   • Lactulose Nausea And Vomiting and Other (See Comments) 03/20/2018   • Quinine derivatives Nausea And Vomiting 02/22/2016       Medication information was obtained from: Patient  Pharmacy and Phone Number: [facility] [phone]    Prior to Admission Medications     Prescriptions Last Dose Informant Patient Reported? Taking?    ALPRAZolam (XANAX) 0.5 MG tablet  Self No Yes    Take 1 tablet by mouth 2 (Two) Times a Day As Needed for Anxiety.    amitriptyline (ELAVIL) 50 MG tablet  Self No Yes    Take 1 tablet by mouth Every Night.    B-D UF III MINI PEN NEEDLES 31G X 5 MM misc  Self No Yes    USE TO INJECT 5 TO 6 TIMES PER DAY    Continuous Blood Gluc Sensor (FREESTYLE YESIKA SENSOR SYSTEM)  Self No Yes     1 Device Every 14 (Fourteen) Days.    cyclobenzaprine (FLEXERIL) 5 MG tablet  Self Yes Yes    Take 5 mg by mouth every night at bedtime.    DULoxetine (CYMBALTA) 30 MG capsule  Self Yes Yes    Take 90 mg by mouth Daily.    folic acid (FOLVITE) 1 MG tablet  Self Yes Yes    Take 1 mg by mouth Daily.    furosemide (LASIX) 40 MG tablet  Self No Yes    Take 1 tablet by mouth Daily.    hydrOXYzine (ATARAX) 25 MG tablet  Self Yes Yes    Take 50 mg by mouth 3 (Three) Times a Day As Needed for Itching.    insulin regular (HUMULIN R) 500 UNIT/ML CONCENTRATED injection  Self Yes Yes    Inject 65 Units under the skin into the appropriate area as directed 3 (Three) Times a Day With Meals. And SS: 3 units for every 50 above 150    insulin regular (HUMULIN R) 500 UNIT/ML CONCENTRATED injection  Self Yes Yes    Inject 30 Units under the skin into the appropriate area as directed 2 (Two) Times a Day. WITH SNACKS AND SS    levETIRAcetam (KEPPRA) 500 MG tablet  Self Yes Yes    Take 500 mg by mouth 2 (Two) Times a Day.    Multiple Vitamins-Minerals (MULTIVITAMIN WITH MINERALS) tablet tablet  Self Yes Yes    Take 1 tablet by mouth Daily.    OxyCODONE HCl (OXAYDO) 7.5 MG tablet   Self Yes Yes    Take 7.5 mg by mouth Every 8 (Eight) Hours As Needed.    pantoprazole (PROTONIX) 40 MG EC tablet  Self Yes Yes    Take 40 mg by mouth Daily.    pregabalin (LYRICA) 75 MG capsule  Self Yes Yes    Take 75 mg by mouth 2 (Two) Times a Day.    promethazine (PHENERGAN) 25 MG tablet  Self Yes Yes    Take 25 mg by mouth Every 6 (Six) Hours As Needed for Nausea or Vomiting.    rifaximin (XIFAXAN) 550 MG tablet  Self Yes Yes    Take 550 mg by mouth 2 (Two) Times a Day.    spironolactone (ALDACTONE) 100 MG tablet  Self Yes Yes    Take 100 mg by mouth Daily.    sucralfate (CARAFATE) 1 g tablet  Self Yes Yes    Take 1 g by mouth 2 (Two) Times a Day.    trimethobenzamide (TIGAN) 300 MG capsule  Self Yes Yes    Take 300 mg by mouth 3 (Three) Times a Day.     vitamin B-12 (CYANOCOBALAMIN) 1000 MCG tablet  Self Yes Yes    Take 1,000 mcg by mouth daily.            Medication notes: Patient is seen at [facility] and reports that her medication list was reviewed with them 2 days ago as well as today with Izabel DUKE. All medications are accurate.    This medication list is complete to the best of my knowledge as of 12/13/2018    Please call if questions.    Barbara Arriola, Medication History Technician  12/13/2018 8:54 PM

## 2018-12-14 NOTE — NURSING NOTE
"Diabetes Education  Assessment/Teaching    Patient Name:  Silvia Zabala  YOB: 1960  MRN: [id number]  Admit Date:  12/13/2018      Assessment Date:  12/14/2018    Most Recent Value   General Information    Referral From:  Database. Meet with 59 y/o at bedside to assess needs for DM ed.    Height  168.9 cm (66.5\")   Height Method  Estimated   Weight  85.4 kg (188 lb 4.4 oz)   Weight Method  Bed scale   Diabetes History   What type of diabetes do you have?  Type 2   Do you test your blood sugar at home?  yes   What makes it difficult for you to take care of your diabetes or yourself?  -- per pt, having gastroparesis.    Do you have any diabetes complications?  circulation problems, neuropathy [as well as gastroparesis. ]   Education Preferences   Barriers to Learning  -- none evident this afternoon.    Assessment Topics   Reducing Risk - Assessment  -- Review: foot care.    Healthy Coping - Assessment  Competent   Monitoring - Assessment  Competent   DM Goals            Most Recent Value   DM Education Needs   Meter  Has own   Meter Type  Freestyle -pt has Freestyle Roseline (which does not require fingersticks)   Medication  Insulin [U-500 Kwikpen as an outpt.]   Reducing Risks  Foot care, Neuropathy [Review w/pt the importance of daily foot care annetta w/neuropathy in her feet]   Healthy Coping  Appropriate   Discharge Plan  Home [pt reports outpt f/u for DM w/endo Dr. Burnett. ]   Teaching Method  Explanation, Discussion   Patient Response  Verbalized understanding            Electronically signed by:  Mei Han, RN, BSN, CDE  12/14/18 2:24 PM  "

## 2018-12-14 NOTE — PLAN OF CARE
Problem: Patient Care Overview  Goal: Plan of Care Review   12/14/18 1415   Coping/Psychosocial   Plan of Care Reviewed With patient   OTHER   Outcome Summary Meet with 57 y/o at bedside to assess needs for DM ed. pls see DM ed flowsheet or note for more detail.

## 2018-12-14 NOTE — CONSULTS
Adult Nutrition  Assessment/PES    Patient Name:  Silvia Zabala  YOB: 1960  MRN: [id number]  Admit Date:  12/13/2018    Assessment Date:  12/14/2018    Comments:  Consult per nursing.  Patient with gastroparesis, DM, cirrhosis.  63% x 2 meals per chart PO data.    Patient reports not much appetite.  She reports her PO intake fluctuates from day to day.  Nauseous a lot (dx gastroparesis).  Drinks 1-2 Glucernas per day.    Patient reports weight fluctuates frequently d/t fluid status (dx cirrhosis).  Weight is up about 20# from last admission in May 2018.    Adding Boost Glucose Control TID with meals to provide additional kcal and protein intake.    Will continue to follow.    Reason for Assessment     Row Name 12/14/18 1508          Reason for Assessment    Reason For Assessment  nurse/nurse practitioner consult     Diagnosis  liver disease;diabetes diagnosis/complications;psychosocial;neurologic conditions;gastrointestinal disease;other (see comments);pulmonary disease DUKES, cirrhosis, depression, DM, seizures, BRICE, RA, gastroparesis, anxiety, anemia, colon polyps, duodenal ulcer, esophageal varices, lupus     Identified At Risk by Screening Criteria  difficulty chewing/swallowing;reduced oral intake over the last month;unintentional loss of 10 lbs or more in the past 2 mos         Nutrition/Diet History     Row Name 12/14/18 1501          Nutrition/Diet History    Typical Food/Fluid Intake  patient reports appetite okay, not too great, but says she ate more of lunch than she is usually able to     Supplemental Drinks/Foods/Additives  drinks 1-2 Glucernas per day depending on how much she is able to eat         Anthropometrics     Row Name 12/14/18 1503          Admit Weight    Admit Weight  -- 188# 12/13        Usual Body Weight (UBW)    Weight Loss Time Frame  appears to have had weight gain; pt reports weight fluctuates alot d/t fluid status        Body Mass Index (BMI)    BMI Assessment  BMI  30-34.9: obesity grade I         Labs/Tests/Procedures/Meds     Row Name 12/14/18 1510          Labs/Procedures/Meds    Lab Results Reviewed  reviewed, pertinent     Lab Results Comments  Gluc, Na+, AlkP, AST, Alb, WBC, Hgb, Hct, Platelets        Diagnostic Tests/Procedures    Diagnostic Test/Procedure Reviewed  reviewed, pertinent        Medications    Pertinent Medications Reviewed  reviewed, pertinent     Pertinent Medications Comments  folic acid, lasix, lantus, humaLOG, keppra, MVI, protonix, carafate, vit B12         Physical Findings     Row Name 12/14/18 1512          Physical Findings    Overall Physical Appearance  overweight     Skin  other (see comments) B=20, rash         Estimated/Assessed Needs     Row Name 12/14/18 1513          Calculation Measurements    Weight Used For Calculations  59 kg (130 lb) IBW        Estimated/Assessed Needs    Additional Documentation  KCAL/KG (Group);Protein Requirements (Group);Fluid Requirements (Group)        KCAL/KG    14 Kcal/Kg (kcal)  825.55     15 Kcal/Kg (kcal)  884.52     18 Kcal/Kg (kcal)  1061.42     20 Kcal/Kg (kcal)  1179.36     25 Kcal/Kg (kcal)  1474.2     30 Kcal/Kg (kcal)  1769.04     35 Kcal/Kg (kcal)  2063.88     40 Kcal/Kg (kcal)  2358.72     45 Kcal/Kg (kcal)  2653.56     50 Kcal/Kg (kcal)  2948.4     kcal/kg (Specify)  -- 9464-8157        Real-St. Jeor Equation    RMR (Real-St. Jeor Equation)  1204.3        Protein Requirements    Est Protein Requirement Amount (gms/kg)  1.5 gm protein     Estimated Protein Requirements (gms/day)  88.45        Fluid Requirements    Estimated Fluid Requirements (mL/day)  1769     Estimated Fluid Requirement Method  RDA Method     RDA Method (mL)  1769     Deepika-Jenna Method (over 20 kg)  2679.36         Nutrition Prescription Ordered     Row Name 12/14/18 1514          Nutrition Prescription PO    Current PO Diet  Regular     Common Modifiers  Cardiac;Consistent Carbohydrate         Evaluation of Received  Nutrient/Fluid Intake     Row Name 12/14/18 1515 12/14/18 1513       Calculation Measurements    Weight Used For Calculations  --  59 kg (130 lb) IBW       PO Evaluation    Number of Meals  2  --    % PO Intake  63  --        Evaluation of Prescribed Nutrient/Fluid Intake     Row Name 12/14/18 1513          Calculation Measurements    Weight Used For Calculations  59 kg (130 lb) IBW             Problem/Interventions:  Problem 1     Row Name 12/14/18 1515          Nutrition Diagnoses Problem 1    Problem 1  Increased Nutrient Needs     Macronutrient  Kcal;Protein     Etiology (related to)  Medical Diagnosis     Hepatic  Cirrhosis     Signs/Symptoms (evidenced by)  Report/Observation                 Intervention Goal     Row Name 12/14/18 1515          Intervention Goal    General  Maintain nutrition;Reduce/improve symptoms;Disease management/therapy;Meet nutritional needs for age/condition     PO  Increase intake;PO intake (%)     PO Intake %  75 %     Weight  No significant weight loss         Nutrition Intervention     Row Name 12/14/18 1515          Nutrition Intervention    RD/Tech Action  Follow Tx progress;Care plan reviewd;Encourage intake;Recommend/ordered     Recommended/Ordered  Supplement         Nutrition Prescription     Row Name 12/14/18 1515          Nutrition Prescription PO    PO Prescription  Begin/change supplement     Supplement  Boost Glucose Control     Supplement Frequency  3 times a day     New PO Prescription Ordered?  Yes         Education/Evaluation     Row Name 12/14/18 1515          Education    Education  Will Instruct as appropriate        Monitor/Evaluation    Monitor  Per protocol;PO intake;Supplement intake;Pertinent labs;Weight;Symptoms           Electronically signed by:  Alicia Farrell RD  12/14/18 3:16 PM

## 2018-12-15 LAB
ALBUMIN UR-MCNC: <1.2 MG/L
CREAT UR-MCNC: 25 MG/DL
GLUCOSE BLDC GLUCOMTR-MCNC: 247 MG/DL (ref 70–130)
GLUCOSE BLDC GLUCOMTR-MCNC: 262 MG/DL (ref 70–130)
GLUCOSE BLDC GLUCOMTR-MCNC: 296 MG/DL (ref 70–130)
GLUCOSE BLDC GLUCOMTR-MCNC: 335 MG/DL (ref 70–130)
MICROALBUMIN/CREAT UR: NORMAL MG/G
T4 FREE SERPL-MCNC: 0.85 NG/DL (ref 0.93–1.7)
TSH SERPL DL<=0.05 MIU/L-ACNC: 1.55 MIU/ML (ref 0.27–4.2)

## 2018-12-15 PROCEDURE — 97162 PT EVAL MOD COMPLEX 30 MIN: CPT

## 2018-12-15 PROCEDURE — G8979 MOBILITY GOAL STATUS: HCPCS

## 2018-12-15 PROCEDURE — 96376 TX/PRO/DX INJ SAME DRUG ADON: CPT

## 2018-12-15 PROCEDURE — 96374 THER/PROPH/DIAG INJ IV PUSH: CPT

## 2018-12-15 PROCEDURE — G0378 HOSPITAL OBSERVATION PER HR: HCPCS

## 2018-12-15 PROCEDURE — 63710000001 INSULIN GLARGINE PER 5 UNITS: Performed by: INTERNAL MEDICINE

## 2018-12-15 PROCEDURE — 82962 GLUCOSE BLOOD TEST: CPT

## 2018-12-15 PROCEDURE — 63710000001 INSULIN LISPRO (HUMAN) PER 5 UNITS: Performed by: INTERNAL MEDICINE

## 2018-12-15 PROCEDURE — 97110 THERAPEUTIC EXERCISES: CPT

## 2018-12-15 PROCEDURE — 63710000001 INSULIN LISPRO (HUMAN) PER 5 UNITS: Performed by: NURSE PRACTITIONER

## 2018-12-15 PROCEDURE — 82043 UR ALBUMIN QUANTITATIVE: CPT | Performed by: INTERNAL MEDICINE

## 2018-12-15 PROCEDURE — 99213 OFFICE O/P EST LOW 20 MIN: CPT | Performed by: INTERNAL MEDICINE

## 2018-12-15 PROCEDURE — 82570 ASSAY OF URINE CREATININE: CPT | Performed by: INTERNAL MEDICINE

## 2018-12-15 PROCEDURE — 25010000002 ONDANSETRON PER 1 MG: Performed by: NURSE PRACTITIONER

## 2018-12-15 PROCEDURE — G8978 MOBILITY CURRENT STATUS: HCPCS

## 2018-12-15 RX ORDER — CYCLOBENZAPRINE HCL 10 MG
5 TABLET ORAL 3 TIMES DAILY PRN
Status: DISCONTINUED | OUTPATIENT
Start: 2018-12-15 | End: 2018-12-18 | Stop reason: HOSPADM

## 2018-12-15 RX ORDER — INSULIN GLARGINE 100 [IU]/ML
55 INJECTION, SOLUTION SUBCUTANEOUS NIGHTLY
Status: DISCONTINUED | OUTPATIENT
Start: 2018-12-15 | End: 2018-12-16

## 2018-12-15 RX ADMIN — INSULIN LISPRO 12 UNITS: 100 INJECTION, SOLUTION INTRAVENOUS; SUBCUTANEOUS at 08:06

## 2018-12-15 RX ADMIN — INSULIN LISPRO 10 UNITS: 100 INJECTION, SOLUTION INTRAVENOUS; SUBCUTANEOUS at 11:48

## 2018-12-15 RX ADMIN — OXYCODONE HYDROCHLORIDE 7.5 MG: 15 TABLET ORAL at 07:42

## 2018-12-15 RX ADMIN — INSULIN LISPRO 12 UNITS: 100 INJECTION, SOLUTION INTRAVENOUS; SUBCUTANEOUS at 21:53

## 2018-12-15 RX ADMIN — PANTOPRAZOLE SODIUM 40 MG: 40 TABLET, DELAYED RELEASE ORAL at 06:49

## 2018-12-15 RX ADMIN — INSULIN LISPRO 16 UNITS: 100 INJECTION, SOLUTION INTRAVENOUS; SUBCUTANEOUS at 11:48

## 2018-12-15 RX ADMIN — TRIMETHOBENZAMIDE HYDROCHLORIDE 300 MG: 300 CAPSULE ORAL at 08:05

## 2018-12-15 RX ADMIN — RIFAXIMIN 550 MG: 550 TABLET ORAL at 20:47

## 2018-12-15 RX ADMIN — SPIRONOLACTONE 100 MG: 100 TABLET ORAL at 08:04

## 2018-12-15 RX ADMIN — SUCRALFATE 1 G: 1 TABLET ORAL at 08:05

## 2018-12-15 RX ADMIN — FOLIC ACID 1 MG: 1 TABLET ORAL at 08:05

## 2018-12-15 RX ADMIN — AMITRIPTYLINE HYDROCHLORIDE 50 MG: 50 TABLET, FILM COATED ORAL at 20:46

## 2018-12-15 RX ADMIN — SUCRALFATE 1 G: 1 TABLET ORAL at 20:47

## 2018-12-15 RX ADMIN — TRIMETHOBENZAMIDE HYDROCHLORIDE 300 MG: 300 CAPSULE ORAL at 15:53

## 2018-12-15 RX ADMIN — INSULIN LISPRO 10 UNITS: 100 INJECTION, SOLUTION INTRAVENOUS; SUBCUTANEOUS at 08:06

## 2018-12-15 RX ADMIN — RIFAXIMIN 550 MG: 550 TABLET ORAL at 08:06

## 2018-12-15 RX ADMIN — INSULIN LISPRO 10 UNITS: 100 INJECTION, SOLUTION INTRAVENOUS; SUBCUTANEOUS at 17:06

## 2018-12-15 RX ADMIN — INSULIN LISPRO 8 UNITS: 100 INJECTION, SOLUTION INTRAVENOUS; SUBCUTANEOUS at 17:06

## 2018-12-15 RX ADMIN — LEVETIRACETAM 500 MG: 500 TABLET, FILM COATED ORAL at 20:47

## 2018-12-15 RX ADMIN — FUROSEMIDE 40 MG: 40 TABLET ORAL at 08:05

## 2018-12-15 RX ADMIN — CYCLOBENZAPRINE 5 MG: 10 TABLET, FILM COATED ORAL at 20:46

## 2018-12-15 RX ADMIN — DULOXETINE HYDROCHLORIDE 90 MG: 60 CAPSULE, DELAYED RELEASE ORAL at 08:05

## 2018-12-15 RX ADMIN — ONDANSETRON 4 MG: 2 INJECTION INTRAMUSCULAR; INTRAVENOUS at 15:53

## 2018-12-15 RX ADMIN — SODIUM CHLORIDE, PRESERVATIVE FREE 3 ML: 5 INJECTION INTRAVENOUS at 21:00

## 2018-12-15 RX ADMIN — MULTIPLE VITAMINS W/ MINERALS TAB 1 TABLET: TAB at 08:05

## 2018-12-15 RX ADMIN — PREGABALIN 75 MG: 25 CAPSULE ORAL at 08:05

## 2018-12-15 RX ADMIN — LEVETIRACETAM 500 MG: 500 TABLET, FILM COATED ORAL at 08:05

## 2018-12-15 RX ADMIN — INSULIN GLARGINE 55 UNITS: 100 INJECTION, SOLUTION SUBCUTANEOUS at 21:53

## 2018-12-15 RX ADMIN — ONDANSETRON 4 MG: 2 INJECTION INTRAMUSCULAR; INTRAVENOUS at 08:04

## 2018-12-15 RX ADMIN — ALPRAZOLAM 0.5 MG: 0.5 TABLET ORAL at 20:47

## 2018-12-15 RX ADMIN — TRIMETHOBENZAMIDE HYDROCHLORIDE 300 MG: 300 CAPSULE ORAL at 20:47

## 2018-12-15 RX ADMIN — OXYCODONE HYDROCHLORIDE 7.5 MG: 15 TABLET ORAL at 15:53

## 2018-12-15 RX ADMIN — PREGABALIN 75 MG: 25 CAPSULE ORAL at 20:47

## 2018-12-15 RX ADMIN — Medication 1000 MCG: at 08:05

## 2018-12-15 NOTE — PROGRESS NOTES
[facility]     LOS: 0 days     Subjective:    CC:Hyperglycemia    DIET:  Diet Order   Procedures   • Diet Regular; Cardiac, Consistent Carbohydrate     She has fallen twice in last couple days prior to admission.     The patient vomited yesterday.  The patient states she doesn't vomit quite frequently at home and not too far from baseline for her.  She is tolerating oral intake.  She has chronic abdominal pain which is fairly close to her baseline.  The patient's blood sugars are still quite uncontrolled.       Objective:    Vital Signs:  Temp:  [98.1 °F (36.7 °C)-98.5 °F (36.9 °C)] 98.1 °F (36.7 °C)  Heart Rate:  [] 97  Resp:  [16] 16  BP: (115-135)/(58-83) 120/67    SpO2:  [94 %-96 %] 96 %  on   ;   Device (Oxygen Therapy): room air  Body mass index is 29.94 kg/m².    Physical Exam   Constitutional: She appears well-developed and well-nourished.   HENT:   Head: Normocephalic and atraumatic.   Cardiovascular: Normal rate and regular rhythm.   No murmur heard.  Pulmonary/Chest: Effort normal and breath sounds normal.   Abdominal: Soft. Bowel sounds are normal. She exhibits no distension. There is no tenderness.   Musculoskeletal:   Left arm in a sling   Neurological: She is alert.   Skin: Skin is warm and dry.       Results Review:    Glucose   Date Value Ref Range Status   12/14/2018 225 (H) 65 - 99 mg/dL Final   12/13/2018 608 (C) 65 - 99 mg/dL Final     Results from last 7 days   Lab Units  12/14/18   0448   WBC 10*3/mm3  3.15*   HEMOGLOBIN g/dL  9.6*   HEMATOCRIT %  30.4*   PLATELETS 10*3/mm3  100*     Results from last 7 days   Lab Units  12/14/18   0448   SODIUM mmol/L  135*   POTASSIUM mmol/L  3.8   CHLORIDE mmol/L  99   CO2 mmol/L  22.5   BUN mg/dL  9   CREATININE mg/dL  0.61   CALCIUM mg/dL  9.0   BILIRUBIN mg/dL  0.7   ALK PHOS U/L  215*   ALT (SGPT) U/L  25   AST (SGOT) U/L  34*   GLUCOSE mg/dL  225*                 Cultures:       I have reviewed daily medications and  changes in CPOE    Scheduled meds    amitriptyline 50 mg Oral Nightly   cyclobenzaprine 5 mg Oral Nightly   DULoxetine 90 mg Oral Daily   folic acid 1 mg Oral Daily   furosemide 40 mg Oral Daily   insulin glargine 55 Units Subcutaneous Nightly   insulin lispro 0-24 Units Subcutaneous 4x Daily With Meals & Nightly   insulin lispro 10 Units Subcutaneous TID With Meals   levETIRAcetam 500 mg Oral Q12H   multivitamin with minerals 1 tablet Oral Daily   pantoprazole 40 mg Oral QAM   pregabalin 75 mg Oral Q12H   rifaximin 550 mg Oral BID   sodium chloride 3 mL Intravenous Q12H   spironolactone 100 mg Oral Daily   sucralfate 1 g Oral BID   trimethobenzamide 300 mg Oral TID   vitamin B-12 1,000 mcg Oral Daily          PRN meds  •  ALPRAZolam  •  cyclobenzaprine  •  dextrose  •  dextrose  •  glucagon (human recombinant)  •  hydrOXYzine  •  ondansetron **OR** ondansetron ODT **OR** ondansetron  •  oxyCODONE  •  promethazine  •  sodium chloride  •  [COMPLETED] Insert peripheral IV **AND** sodium chloride        Type 2 diabetes mellitus with hyperglycemia, with long-term current use of insulin (CMS/HCC)    Thrombocytopenia (CMS/HCC)    Cirrhosis of liver (CMS/HCC)    Rheumatoid arthritis (CMS/HCC)    Anxiety and depression    DUKES (nonalcoholic steatohepatitis)    Hyperlipidemia    BRICE (obstructive sleep apnea)    Gastroparesis    Hyperammonemia (CMS/HCC)    Hyponatremia    Anemia        Assessment/Plan:    Type 2 diabetes mellitus with hyperglycemia, with long-term current use of insulin (CMS/HCC)  -Blood sugars are much better controlled compared to admission.  But still running 200s to 300s.  -Endocrinology is following and they have increased her Lantus to 55 and are continuing with Humalog 10 with meals plus sliding scale insulin.      Thrombocytopenia (CMS/HCC)  -platelets are stable      Cirrhosis of liver (CMS/HCC)  -LFT      Rheumatoid arthritis (CMS/HCC)      Anxiety and depression      DUKES (nonalcoholic  steatohepatitis)      Hyperlipidemia      BRICE (obstructive sleep apnea)      Gastroparesis      Hyperammonemia (CMS/HCC)      Hyponatremia      Anemia  -Hemoglobin level is slightly lower than baseline about 10    Plan:  Chronic vomiting and abdominal pain which is fairly stable.  Assuming patient's blood sugars stabilize overnight possible d/c tomorrow    Jose Almanza MD  12/15/18  4:41 PM

## 2018-12-15 NOTE — PLAN OF CARE
Problem: Patient Care Overview  Goal: Plan of Care Review   12/15/18 5760   Coping/Psychosocial   Plan of Care Reviewed With patient   OTHER   Outcome Summary Pt admitted 12/13 with +fall, DM2, DUKES, and anasarca. Pt reports h/o falls with recent L humerus fx, currently NWB LUE in sling/swath. Today pt ambulated 145' w/ min A and HHAx1; several loss of balance episodes requiring min A to prevent fall. Pt demonstrates generalized weakness, impaired standing balance, and gait pattern. Pt interested in DC to SNU -may benefit from SNU due to balance/strength deficits.

## 2018-12-15 NOTE — PLAN OF CARE
Problem: Patient Care Overview  Goal: Plan of Care Review  Outcome: Ongoing (interventions implemented as appropriate)   12/15/18 6219   Coping/Psychosocial   Plan of Care Reviewed With patient   OTHER   Outcome Summary Pt A&Ox4, meds with pudding, mood appropriate, diet CC HH, assist x1, xanax given at bedtime, accu checks ac/hs lantus given with ss humalog, LF arm in sling with ace wrap around whole body, consults to be done tomorrow and possibly discharged   Plan of Care Review   Progress improving       Problem: Fall Risk (Adult)  Goal: Absence of Fall  Outcome: Ongoing (interventions implemented as appropriate)      Problem: Skin Injury Risk (Adult)  Goal: Skin Health and Integrity  Outcome: Ongoing (interventions implemented as appropriate)      Problem: Hyperglycemia, Persistent (Adult)  Goal: Signs and Symptoms of Listed Potential Problems Will be Absent, Minimized or Managed (Hyperglycemia, Persistent)  Outcome: Ongoing (interventions implemented as appropriate)

## 2018-12-15 NOTE — CONSULTS
[facility]/[place]              Initial Inpatient Consult Note  Referring Provider: Andrew  Reason for Consultation: Cirrhosis    Subjective     History of present illness:  Patient with end-stage liver disease secondary to DUKES.  She is had numerous issues over the past year which relate to severe hyperglycemia, nausea thought to be due to diabetic gastroparesis, chronic abdominal pain which appears to be refractory to manipulation, and hepatic encephalopathy which waxes and wanes.  She has been treated with promotility agents.  She developed tardive dyskinesia and those medications have been completely discontinued.  She was admitted with profound fatigue and was found to have severe hyperglycemia and has been under therapy for that.  From a more chronic perspective, she voices no symptoms today that she is not presented on multiple occasions with in the office.  Her biggest concern was a fracture of the left shoulder and there is ongoing consideration for the possibility of this being a pathologic fracture.    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   • Cirrhosis (CMS/HCC)    • Colon polyps    • Deafness    • Depression    • Diabetes mellitus (CMS/HCC)     Adult onset, insulin requiring   • Duodenal ulcer with hemorrhage    • Esophageal varices determined by endoscopy (CMS/HCC)    • Fibromyalgia    • Gallbladder disease    • Gastric varices    • Gastroparesis    • Glaucoma    • Granuloma annulare    • H/O B12 deficiency    • H/O Bleeding ulcer     And gastroesophageal varices   • H/O mixed connective tissue disorder    • Hemorrhoids    • Hiatal hernia    • History of transfusion    • Hx of being hospitalized     In [place] for GI  "bleeding from ulcer and varices   • Hyperlipidemia    • Migraine    • DUKES (nonalcoholic steatohepatitis) 2016   • BRICE (obstructive sleep apnea)    • Pancreas divisum    • Pancytopenia (CMS/HCC)     Chronic, due to cirrhosis and hypersplenism   • Peptic ulcer disease     And esophageal varices   • PONV (postoperative nausea and vomiting)    • RA (rheumatoid arthritis) (CMS/HCC)    • Seizure disorder (CMS/HCC)     LAST ONE SEVERAL YEARS AGO   • Seizures (CMS/HCC)    • Systemic lupus (CMS/HCC)        Past Surgical History:  Past Surgical History:   Procedure Laterality Date   • BLADDER REPAIR     • CATARACT EXTRACTION     • CHOLECYSTECTOMY     • ENDOSCOPY AND COLONOSCOPY      Dr. Rich Coleman with findings of candida esophagitis   • EYE SURGERY     • HYSTERECTOMY      partial   • JOINT REPLACEMENT     • KNEE SURGERY Right     \"right knee recontstruction\"   • LIVER BIOPSY  2015   • MASS EXCISION     • STOMACH SURGERY          Social History:   Social History     Tobacco Use   • Smoking status: Former Smoker     Packs/day: 0.00     Years: 0.00     Pack years: 0.00     Last attempt to quit: 2015     Years since quittin.9   • Smokeless tobacco: Never Used   Substance Use Topics   • Alcohol use: No        Family History:  Family History   Problem Relation Age of Onset   • Liver disease Other    • Depression Other    • Heart disease Other    • Hypertension Other    • Diabetes Other    • Breast cancer Other    • Brain cancer Other    • Uterine cancer Other    • Colon cancer Other    • Leukemia Other    • Colon cancer Maternal Aunt    • Hypertension Mother    • Diabetes type II Mother    • Rheum arthritis Mother    • Liver disease Mother         DUKES   • Heart disease Father    • Diabetes type II Father    • Diabetes type II Sister    • Lupus Sister    • Malig Hyperthermia Neg Hx        Home Meds:  Medications Prior to Admission   Medication Sig Dispense Refill Last Dose   • ALPRAZolam " (XANAX) 0.5 MG tablet Take 1 tablet by mouth 2 (Two) Times a Day As Needed for Anxiety. 60 tablet 1 Taking   • amitriptyline (ELAVIL) 50 MG tablet Take 1 tablet by mouth Every Night. 30 tablet 0 Taking   • B-D UF III MINI PEN NEEDLES 31G X 5 MM misc USE TO INJECT 5 TO 6 TIMES PER  each 2 Taking   • Continuous Blood Gluc Sensor (FREESTYLE YESIKA SENSOR SYSTEM) 1 Device Every 14 (Fourteen) Days. 2 each 3    • cyclobenzaprine (FLEXERIL) 5 MG tablet Take 5 mg by mouth every night at bedtime.      • DULoxetine (CYMBALTA) 30 MG capsule Take 90 mg by mouth Daily.      • folic acid (FOLVITE) 1 MG tablet Take 1 mg by mouth Daily.      • furosemide (LASIX) 40 MG tablet Take 1 tablet by mouth Daily. 30 tablet 2 Taking   • hydrOXYzine (ATARAX) 25 MG tablet Take 50 mg by mouth 3 (Three) Times a Day As Needed for Itching.   Taking   • insulin regular (HUMULIN R) 500 UNIT/ML CONCENTRATED injection Inject 65 Units under the skin into the appropriate area as directed 3 (Three) Times a Day With Meals. And SS: 3 units for every 50 above 150      • insulin regular (HUMULIN R) 500 UNIT/ML CONCENTRATED injection Inject 30 Units under the skin into the appropriate area as directed 2 (Two) Times a Day. WITH SNACKS AND SS      • levETIRAcetam (KEPPRA) 500 MG tablet Take 500 mg by mouth 2 (Two) Times a Day.      • Multiple Vitamins-Minerals (MULTIVITAMIN WITH MINERALS) tablet tablet Take 1 tablet by mouth Daily.      • OxyCODONE HCl (OXAYDO) 7.5 MG tablet  Take 7.5 mg by mouth Every 8 (Eight) Hours As Needed.   Taking   • pantoprazole (PROTONIX) 40 MG EC tablet Take 40 mg by mouth Daily.      • pregabalin (LYRICA) 75 MG capsule Take 75 mg by mouth 2 (Two) Times a Day.      • promethazine (PHENERGAN) 25 MG tablet Take 25 mg by mouth Every 6 (Six) Hours As Needed for Nausea or Vomiting.      • rifaximin (XIFAXAN) 550 MG tablet Take 550 mg by mouth 2 (Two) Times a Day.      • spironolactone (ALDACTONE) 100 MG tablet Take 100 mg by mouth  "Daily.      • sucralfate (CARAFATE) 1 g tablet Take 1 g by mouth 2 (Two) Times a Day.      • trimethobenzamide (TIGAN) 300 MG capsule Take 300 mg by mouth 3 (Three) Times a Day.   Taking   • vitamin B-12 (CYANOCOBALAMIN) 1000 MCG tablet Take 1,000 mcg by mouth daily.   Taking       Current Meds:     amitriptyline 50 mg Oral Nightly   cyclobenzaprine 5 mg Oral Nightly   DULoxetine 90 mg Oral Daily   folic acid 1 mg Oral Daily   furosemide 40 mg Oral Daily   insulin glargine 50 Units Subcutaneous Nightly   insulin lispro 0-24 Units Subcutaneous 4x Daily With Meals & Nightly   insulin lispro 10 Units Subcutaneous TID With Meals   levETIRAcetam 500 mg Oral Q12H   multivitamin with minerals 1 tablet Oral Daily   pantoprazole 40 mg Oral QAM   pregabalin 75 mg Oral Q12H   rifaximin 550 mg Oral BID   sodium chloride 3 mL Intravenous Q12H   spironolactone 100 mg Oral Daily   sucralfate 1 g Oral BID   trimethobenzamide 300 mg Oral TID   vitamin B-12 1,000 mcg Oral Daily       Allergies:  Allergies   Allergen Reactions   • Albuterol Anaphylaxis   • Tramadol Nausea Only, Other (See Comments) and GI Intolerance     Per pt causes her \"palsy, meaning shaking and tremors\"    • Lactulose Nausea And Vomiting and Other (See Comments)     Severe abdominal pain   • Quinine Derivatives Nausea And Vomiting       Review of Systems  Pertinent items are noted in HPI, all other systems reviewed and negative    Objective     Vital Signs  Temp:  [98.2 °F (36.8 °C)-98.8 °F (37.1 °C)] 98.2 °F (36.8 °C)  Heart Rate:  [] 99  Resp:  [16-20] 16  BP: (131-151)/(68-82) 135/80    Physical Exam:     General Appearance:    Alert, cooperative, in no acute distress   Head:    Normocephalic, without obvious abnormality, atraumatic   Eyes:            Lids and lashes normal, conjunctivae and sclerae normal, no   icterus, no pallor, corneas clear, PERRLA   Ears:    Ears appear intact with no abnormalities noted   Throat:   No oral lesions, no thrush, oral " mucosa moist   Neck:   No adenopathy, supple, trachea midline, no thyromegaly, no     carotid bruit, no JVD   Back:     No kyphosis present, no scoliosis present, no skin lesions,       erythema or scars, no tenderness to percussion or                   palpation,   range of motion normal   Lungs:     Clear to auscultation,respirations regular, even and                   unlabored    Heart:    Regular rhythm and normal rate, normal S1 and S2, no            murmur, no gallop, no rub, no click   Breast Exam:    Deferred   Abdomen:     Mildly distended and tender consistent with prior examinations    Genitalia:    Deferred   Extremities:   Moves all extremities well, , no cyanosis, 3+ pitting edema   Pulses:   Pulses palpable and equal bilaterally   Skin:   No bleeding, bruising or rash   Lymph nodes:   No palpable adenopathy   Neurologic:   Cranial nerves 2 - 12 grossly intact, sensation intact, DTR        present and equal bilaterally. Tardive dyskinesia is present. No asterixis.        Results Review:   I reviewed the patient's new clinical results.    WBC No results found for: WBCS   HGB Hemoglobin   Date Value Ref Range Status   12/14/2018 9.6 (L) 11.9 - 15.5 g/dL Final   12/13/2018 10.1 (L) 11.9 - 15.5 g/dL Final      HCT Hematocrit   Date Value Ref Range Status   12/14/2018 30.4 (L) 35.6 - 45.5 % Final   12/13/2018 34.0 (L) 35.6 - 45.5 % Final      Platelets No results found for: LABPLAT   MCV MCV   Date Value Ref Range Status   12/14/2018 83.5 80.5 - 98.2 fL Final   12/13/2018 89.2 80.5 - 98.2 fL Final          Sodium Sodium   Date Value Ref Range Status   12/14/2018 135 (L) 136 - 145 mmol/L Final   12/13/2018 131 (L) 136 - 145 mmol/L Final      Potassium Potassium   Date Value Ref Range Status   12/14/2018 3.8 3.5 - 5.2 mmol/L Final   12/13/2018 4.8 3.5 - 5.2 mmol/L Final      Chloride Chloride   Date Value Ref Range Status   12/14/2018 99 98 - 107 mmol/L Final   12/13/2018 95 (L) 98 - 107 mmol/L Final       CO2 CO2   Date Value Ref Range Status   12/14/2018 22.5 22.0 - 29.0 mmol/L Final   12/13/2018 21.9 (L) 22.0 - 29.0 mmol/L Final      BUN BUN   Date Value Ref Range Status   12/14/2018 9 6 - 20 mg/dL Final   12/13/2018 11 6 - 20 mg/dL Final      Creatinine Creatinine   Date Value Ref Range Status   12/14/2018 0.61 0.57 - 1.00 mg/dL Final   12/13/2018 0.87 0.57 - 1.00 mg/dL Final      Calcium Calcium   Date Value Ref Range Status   12/14/2018 9.0 8.6 - 10.5 mg/dL Final   12/13/2018 8.9 8.6 - 10.5 mg/dL Final      Albumin Albumin   Date Value Ref Range Status   12/14/2018 3.10 (L) 3.50 - 5.20 g/dL Final   12/13/2018 3.30 (L) 3.50 - 5.20 g/dL Final      AST  ALT  PT/INR:   AST (SGOT)   Date Value Ref Range Status   12/14/2018 34 (H) 1 - 32 U/L Final   12/13/2018 39 (H) 1 - 32 U/L Final     ALT (SGPT)   Date Value Ref Range Status   12/14/2018 25 1 - 33 U/L Final   12/13/2018 31 1 - 33 U/L Final     No results found for: PROTIME/No results found for: INR         Imaging Results (last 72 hours)     Procedure Component Value Units Date/Time    XR Chest 2 View [[id number]] Collected:  12/13/18 1958     Updated:  12/13/18 2006    Narrative:       XR CHEST 2 VW-, XR HUMERUS LEFT-, XR SHOULDER 2+ VW LEFT-     Clinical: Fell 4 weeks ago, recent fall, multifocal pain     COMPARISON chest radiograph 10/2/2018     Chest findings: There is a left humerus fracture. The left ribs overall  are satisfactory in appearance.     Cardiac size upper normal. Mediastinum and anaid are satisfactory in  appearance. No pneumothorax, pleural effusion, vascular congestion nor  active airspace disease demonstrated.     CONCLUSION: No active cardiovascular or pulmonary process is  demonstrated. Left humeral fracture.     Left shoulder left humerus findings: There is a fracture demonstrated  through the surgical neck of the left humeral head. There is rotation of  the humeral head however no dislocation. Superior displacement of the  shaft. At the  site of fracture the bone demonstrates a mottled  appearance, potential pathologic fracture. The acromioclavicular  alignment is satisfactory. No fracture of the scapula or adjacent ribs.  Overlying soft tissues are satisfactory in appearance. Mid and distal  humerus are normal in appearance.     CONCLUSION: Fracture through the surgical neck of the left humeral head,  potential pathologic fracture.     This report was finalized on 12/13/2018 8:03 PM by Dr. Francisco Dela Cruz M.D.       XR Shoulder 2+ View Left [[id number]] Collected:  12/13/18 1958     Updated:  12/13/18 2006    Narrative:       XR CHEST 2 VW-, XR HUMERUS LEFT-, XR SHOULDER 2+ VW LEFT-     Clinical: Fell 4 weeks ago, recent fall, multifocal pain     COMPARISON chest radiograph 10/2/2018     Chest findings: There is a left humerus fracture. The left ribs overall  are satisfactory in appearance.     Cardiac size upper normal. Mediastinum and anaid are satisfactory in  appearance. No pneumothorax, pleural effusion, vascular congestion nor  active airspace disease demonstrated.     CONCLUSION: No active cardiovascular or pulmonary process is  demonstrated. Left humeral fracture.     Left shoulder left humerus findings: There is a fracture demonstrated  through the surgical neck of the left humeral head. There is rotation of  the humeral head however no dislocation. Superior displacement of the  shaft. At the site of fracture the bone demonstrates a mottled  appearance, potential pathologic fracture. The acromioclavicular  alignment is satisfactory. No fracture of the scapula or adjacent ribs.  Overlying soft tissues are satisfactory in appearance. Mid and distal  humerus are normal in appearance.     CONCLUSION: Fracture through the surgical neck of the left humeral head,  potential pathologic fracture.     This report was finalized on 12/13/2018 8:03 PM by Dr. Francisco Dela Cruz M.D.       XR Humerus Left [[id number]] Collected:  12/13/18 1958     Updated:   12/13/18 2006    Narrative:       XR CHEST 2 VW-, XR HUMERUS LEFT-, XR SHOULDER 2+ VW LEFT-     Clinical: Fell 4 weeks ago, recent fall, multifocal pain     COMPARISON chest radiograph 10/2/2018     Chest findings: There is a left humerus fracture. The left ribs overall  are satisfactory in appearance.     Cardiac size upper normal. Mediastinum and anaid are satisfactory in  appearance. No pneumothorax, pleural effusion, vascular congestion nor  active airspace disease demonstrated.     CONCLUSION: No active cardiovascular or pulmonary process is  demonstrated. Left humeral fracture.     Left shoulder left humerus findings: There is a fracture demonstrated  through the surgical neck of the left humeral head. There is rotation of  the humeral head however no dislocation. Superior displacement of the  shaft. At the site of fracture the bone demonstrates a mottled  appearance, potential pathologic fracture. The acromioclavicular  alignment is satisfactory. No fracture of the scapula or adjacent ribs.  Overlying soft tissues are satisfactory in appearance. Mid and distal  humerus are normal in appearance.     CONCLUSION: Fracture through the surgical neck of the left humeral head,  potential pathologic fracture.     This report was finalized on 12/13/2018 8:03 PM by Dr. Francisco Dela Cruz M.D.             Assessment/Plan       Type 2 diabetes mellitus with hyperglycemia, with long-term current use of insulin (CMS/HCC)    Cirrhosis of liver (CMS/HCC)    Rheumatoid arthritis (CMS/HCC)    Anxiety and depression    DUKES (nonalcoholic steatohepatitis)    Hyperlipidemia    Thrombocytopenia (CMS/HCC)    BRICE (obstructive sleep apnea)    Gastroparesis    Hyperammonemia (CMS/HCC)    Hyponatremia    Anemia      Patient has end-stage liver disease secondary to DUKES.  Her diabetes has been extremely difficult to control and this likely is aggravated by her gastroparesis which in turn is aggravated by her severe hyperglycemia.  She is  been in this vicious cycle for several months.  Metoclopramide is contraindicated.  We can consider intravenous erythromycin right now her symptoms do not appear to warrant it.    Cross coverage will be observed available this weekend but they will not see unless called to do so.    I discussed the patients findings and my recommendations with patient    Rich Coleman MD  [facility]/[place]  12/14/18  8:49 PM

## 2018-12-15 NOTE — PROGRESS NOTES
"  ENDOCRINE    Subjective   AND PLANS  Silvia Zabala is a 58 y.o. female.     Follow-up diabetes and related disorders.    Feels better today.  No nausea or vomiting.  Fasting glucose 262.  Random glucose 102-213.  Increase Lantus to 55 units every evening.  Continue Humalog 10 units with each meal plus sliding scale.    Check urine microalbumin.  Check lipid profile, vitamin D, and insulin antibodies in AM  Check thyroid function tests    Objective   /58 (BP Location: Right arm, Patient Position: Lying)   Pulse 95   Temp 98.1 °F (36.7 °C) (Oral)   Resp 16   Ht 168.9 cm (66.5\")   Wt 85.4 kg (188 lb 4.4 oz)   SpO2 95%   BMI 29.94 kg/m²   Physical Exam    Awake, alert, not in distress.  No rales or wheezes  Regular heart rate and rhythm.  Abdomen soft, nontender.  +1 leg edema.  No calf tenderness.  No cyanosis.    Lab Results (last 24 hours)     Procedure Component Value Units Date/Time    POC Glucose Once [[id number]]  (Abnormal) Collected:  12/15/18 0558    Specimen:  Blood Updated:  12/15/18 0559     Glucose 262 mg/dL     POC Glucose Once [[id number]]  (Abnormal) Collected:  12/14/18 2004    Specimen:  Blood Updated:  12/14/18 2005     Glucose 200 mg/dL     POC Glucose Once [[id number]]  (Normal) Collected:  12/14/18 1605    Specimen:  Blood Updated:  12/14/18 1607     Glucose 102 mg/dL     POC Glucose Once [[id number]]  (Abnormal) Collected:  12/14/18 1100    Specimen:  Blood Updated:  12/14/18 1102     Glucose 213 mg/dL               Type 2 diabetes mellitus with hyperglycemia, with long-term current use of insulin (CMS/HCC)    Cirrhosis of liver (CMS/HCC)    Rheumatoid arthritis (CMS/HCC)    Anxiety and depression    DUKES (nonalcoholic steatohepatitis)    Hyperlipidemia    Thrombocytopenia (CMS/HCC)    BRICE (obstructive sleep apnea)    Gastroparesis    Hyperammonemia (CMS/HCC)    Hyponatremia    Anemia    Insulin therapy as discussed above.  Check lipids and thyroid function tests.  Other labs as " discussed above.

## 2018-12-16 ENCOUNTER — APPOINTMENT (OUTPATIENT)
Dept: GENERAL RADIOLOGY | Facility: HOSPITAL | Age: 58
End: 2018-12-16

## 2018-12-16 PROBLEM — E55.9 VITAMIN D INSUFFICIENCY: Status: ACTIVE | Noted: 2018-12-16

## 2018-12-16 LAB
25(OH)D3 SERPL-MCNC: 27.8 NG/ML (ref 30–100)
ALBUMIN SERPL-MCNC: 3.3 G/DL (ref 3.5–5.2)
ALBUMIN/GLOB SERPL: 1.1 G/DL
ALP SERPL-CCNC: 202 U/L (ref 39–117)
ALT SERPL W P-5'-P-CCNC: 22 U/L (ref 1–33)
ANION GAP SERPL CALCULATED.3IONS-SCNC: 12.6 MMOL/L
AST SERPL-CCNC: 26 U/L (ref 1–32)
BILIRUB SERPL-MCNC: 0.8 MG/DL (ref 0.1–1.2)
BUN BLD-MCNC: 11 MG/DL (ref 6–20)
BUN/CREAT SERPL: 13.6 (ref 7–25)
CALCIUM SPEC-SCNC: 8.9 MG/DL (ref 8.6–10.5)
CHLORIDE SERPL-SCNC: 99 MMOL/L (ref 98–107)
CHOLEST SERPL-MCNC: 173 MG/DL (ref 0–200)
CO2 SERPL-SCNC: 26.4 MMOL/L (ref 22–29)
CREAT BLD-MCNC: 0.81 MG/DL (ref 0.57–1)
GFR SERPL CREATININE-BSD FRML MDRD: 73 ML/MIN/1.73
GLOBULIN UR ELPH-MCNC: 3.1 GM/DL
GLUCOSE BLD-MCNC: 216 MG/DL (ref 65–99)
GLUCOSE BLDC GLUCOMTR-MCNC: 148 MG/DL (ref 70–130)
GLUCOSE BLDC GLUCOMTR-MCNC: 173 MG/DL (ref 70–130)
GLUCOSE BLDC GLUCOMTR-MCNC: 233 MG/DL (ref 70–130)
GLUCOSE BLDC GLUCOMTR-MCNC: 239 MG/DL (ref 70–130)
HDLC SERPL-MCNC: 53 MG/DL (ref 40–60)
LDLC SERPL CALC-MCNC: 107 MG/DL (ref 0–100)
LDLC/HDLC SERPL: 2.02 {RATIO}
POTASSIUM BLD-SCNC: 4 MMOL/L (ref 3.5–5.2)
PROT SERPL-MCNC: 6.4 G/DL (ref 6–8.5)
SODIUM BLD-SCNC: 138 MMOL/L (ref 136–145)
TRIGL SERPL-MCNC: 65 MG/DL (ref 0–150)
VLDLC SERPL-MCNC: 13 MG/DL (ref 5–40)

## 2018-12-16 PROCEDURE — 63710000001 INSULIN LISPRO (HUMAN) PER 5 UNITS: Performed by: NURSE PRACTITIONER

## 2018-12-16 PROCEDURE — 82962 GLUCOSE BLOOD TEST: CPT

## 2018-12-16 PROCEDURE — 86337 INSULIN ANTIBODIES: CPT | Performed by: INTERNAL MEDICINE

## 2018-12-16 PROCEDURE — 96376 TX/PRO/DX INJ SAME DRUG ADON: CPT

## 2018-12-16 PROCEDURE — 86341 ISLET CELL ANTIBODY: CPT | Performed by: INTERNAL MEDICINE

## 2018-12-16 PROCEDURE — 99214 OFFICE O/P EST MOD 30 MIN: CPT | Performed by: INTERNAL MEDICINE

## 2018-12-16 PROCEDURE — 82306 VITAMIN D 25 HYDROXY: CPT | Performed by: INTERNAL MEDICINE

## 2018-12-16 PROCEDURE — 80061 LIPID PANEL: CPT | Performed by: INTERNAL MEDICINE

## 2018-12-16 PROCEDURE — 63710000001 INSULIN GLARGINE PER 5 UNITS: Performed by: INTERNAL MEDICINE

## 2018-12-16 PROCEDURE — 84681 ASSAY OF C-PEPTIDE: CPT | Performed by: INTERNAL MEDICINE

## 2018-12-16 PROCEDURE — G0378 HOSPITAL OBSERVATION PER HR: HCPCS

## 2018-12-16 PROCEDURE — 80053 COMPREHEN METABOLIC PANEL: CPT | Performed by: INTERNAL MEDICINE

## 2018-12-16 PROCEDURE — 25010000002 ONDANSETRON PER 1 MG: Performed by: NURSE PRACTITIONER

## 2018-12-16 PROCEDURE — 63710000001 PROMETHAZINE PER 25 MG: Performed by: NURSE PRACTITIONER

## 2018-12-16 PROCEDURE — 99213 OFFICE O/P EST LOW 20 MIN: CPT | Performed by: INTERNAL MEDICINE

## 2018-12-16 PROCEDURE — 63710000001 INSULIN LISPRO (HUMAN) PER 5 UNITS: Performed by: INTERNAL MEDICINE

## 2018-12-16 PROCEDURE — 74220 X-RAY XM ESOPHAGUS 1CNTRST: CPT

## 2018-12-16 RX ORDER — MELATONIN
1000 DAILY
Status: DISCONTINUED | OUTPATIENT
Start: 2018-12-16 | End: 2018-12-18 | Stop reason: HOSPADM

## 2018-12-16 RX ORDER — INSULIN GLARGINE 100 [IU]/ML
32 INJECTION, SOLUTION SUBCUTANEOUS EVERY 12 HOURS SCHEDULED
Status: DISCONTINUED | OUTPATIENT
Start: 2018-12-16 | End: 2018-12-18 | Stop reason: HOSPADM

## 2018-12-16 RX ORDER — FUROSEMIDE 40 MG/1
40 TABLET ORAL DAILY
Status: DISCONTINUED | OUTPATIENT
Start: 2018-12-18 | End: 2018-12-18 | Stop reason: HOSPADM

## 2018-12-16 RX ORDER — SPIRONOLACTONE 100 MG/1
100 TABLET, FILM COATED ORAL DAILY
Status: DISCONTINUED | OUTPATIENT
Start: 2018-12-18 | End: 2018-12-18 | Stop reason: HOSPADM

## 2018-12-16 RX ADMIN — LEVETIRACETAM 500 MG: 500 TABLET, FILM COATED ORAL at 08:23

## 2018-12-16 RX ADMIN — SUCRALFATE 1 G: 1 TABLET ORAL at 08:23

## 2018-12-16 RX ADMIN — CYCLOBENZAPRINE 5 MG: 10 TABLET, FILM COATED ORAL at 21:43

## 2018-12-16 RX ADMIN — ONDANSETRON 4 MG: 4 TABLET, FILM COATED ORAL at 00:19

## 2018-12-16 RX ADMIN — DULOXETINE HYDROCHLORIDE 90 MG: 60 CAPSULE, DELAYED RELEASE ORAL at 08:23

## 2018-12-16 RX ADMIN — OXYCODONE HYDROCHLORIDE 7.5 MG: 15 TABLET ORAL at 00:19

## 2018-12-16 RX ADMIN — FOLIC ACID 1 MG: 1 TABLET ORAL at 08:23

## 2018-12-16 RX ADMIN — INSULIN LISPRO 10 UNITS: 100 INJECTION, SOLUTION INTRAVENOUS; SUBCUTANEOUS at 08:23

## 2018-12-16 RX ADMIN — OXYCODONE HYDROCHLORIDE 7.5 MG: 15 TABLET ORAL at 08:22

## 2018-12-16 RX ADMIN — INSULIN GLARGINE 32 UNITS: 100 INJECTION, SOLUTION SUBCUTANEOUS at 21:43

## 2018-12-16 RX ADMIN — AMITRIPTYLINE HYDROCHLORIDE 50 MG: 50 TABLET, FILM COATED ORAL at 21:42

## 2018-12-16 RX ADMIN — ONDANSETRON 4 MG: 2 INJECTION INTRAMUSCULAR; INTRAVENOUS at 22:56

## 2018-12-16 RX ADMIN — PROMETHAZINE HYDROCHLORIDE 25 MG: 25 TABLET ORAL at 12:02

## 2018-12-16 RX ADMIN — PROMETHAZINE HYDROCHLORIDE 25 MG: 25 TABLET ORAL at 06:23

## 2018-12-16 RX ADMIN — RIFAXIMIN 550 MG: 550 TABLET ORAL at 21:42

## 2018-12-16 RX ADMIN — VITAMIN D, TAB 1000IU (100/BT) 1000 UNITS: 25 TAB at 11:40

## 2018-12-16 RX ADMIN — INSULIN LISPRO 10 UNITS: 100 INJECTION, SOLUTION INTRAVENOUS; SUBCUTANEOUS at 17:17

## 2018-12-16 RX ADMIN — LEVETIRACETAM 500 MG: 500 TABLET, FILM COATED ORAL at 21:42

## 2018-12-16 RX ADMIN — TRIMETHOBENZAMIDE HYDROCHLORIDE 300 MG: 300 CAPSULE ORAL at 16:13

## 2018-12-16 RX ADMIN — TRIMETHOBENZAMIDE HYDROCHLORIDE 300 MG: 300 CAPSULE ORAL at 08:22

## 2018-12-16 RX ADMIN — TRIMETHOBENZAMIDE HYDROCHLORIDE 300 MG: 300 CAPSULE ORAL at 21:42

## 2018-12-16 RX ADMIN — MULTIPLE VITAMINS W/ MINERALS TAB 1 TABLET: TAB at 08:23

## 2018-12-16 RX ADMIN — PREGABALIN 75 MG: 25 CAPSULE ORAL at 08:22

## 2018-12-16 RX ADMIN — ALPRAZOLAM 0.5 MG: 0.5 TABLET ORAL at 23:01

## 2018-12-16 RX ADMIN — PANTOPRAZOLE SODIUM 40 MG: 40 TABLET, DELAYED RELEASE ORAL at 06:21

## 2018-12-16 RX ADMIN — INSULIN LISPRO 8 UNITS: 100 INJECTION, SOLUTION INTRAVENOUS; SUBCUTANEOUS at 17:17

## 2018-12-16 RX ADMIN — SODIUM CHLORIDE, PRESERVATIVE FREE 3 ML: 5 INJECTION INTRAVENOUS at 22:07

## 2018-12-16 RX ADMIN — SPIRONOLACTONE 100 MG: 100 TABLET ORAL at 08:23

## 2018-12-16 RX ADMIN — Medication 1000 MCG: at 08:23

## 2018-12-16 RX ADMIN — PREGABALIN 75 MG: 25 CAPSULE ORAL at 21:42

## 2018-12-16 RX ADMIN — SUCRALFATE 1 G: 1 TABLET ORAL at 21:42

## 2018-12-16 RX ADMIN — FUROSEMIDE 40 MG: 40 TABLET ORAL at 08:23

## 2018-12-16 RX ADMIN — OXYCODONE HYDROCHLORIDE 7.5 MG: 15 TABLET ORAL at 21:55

## 2018-12-16 RX ADMIN — INSULIN LISPRO 4 UNITS: 100 INJECTION, SOLUTION INTRAVENOUS; SUBCUTANEOUS at 11:41

## 2018-12-16 RX ADMIN — INSULIN LISPRO 8 UNITS: 100 INJECTION, SOLUTION INTRAVENOUS; SUBCUTANEOUS at 08:23

## 2018-12-16 RX ADMIN — RIFAXIMIN 550 MG: 550 TABLET ORAL at 08:23

## 2018-12-16 NOTE — PLAN OF CARE
Problem: Patient Care Overview  Goal: Plan of Care Review  Outcome: Ongoing (interventions implemented as appropriate)   12/16/18 5216   Coping/Psychosocial   Plan of Care Reviewed With patient   OTHER   Outcome Summary VSS. Medicated for pain x2. Clear liquid diet. Esophagram complete. Left arm in sling and wrapped aginst body. Stand-by assist. Medical records from [facility] in [place], [place] requested. ST on monitor. Continue to monitor pt.    Plan of Care Review   Progress improving

## 2018-12-16 NOTE — PROGRESS NOTES
[facility]     LOS: 0 days     Subjective:    CC:Hyperglycemia    DIET:  Diet Order   Procedures   • Diet Clear Liquid     She has fallen twice in last couple days prior to admission.     Choked on food last night, food is getting stuck in the esophgus    Clears this am, last ate     She was admitted in [place] few weeks ago    Objective:    Vital Signs:  Temp:  [98 °F (36.7 °C)-98.4 °F (36.9 °C)] 98.4 °F (36.9 °C)  Heart Rate:  [] 97  Resp:  [16] 16  BP: (112-126)/(64-69) 113/64    SpO2:  [92 %-96 %] 92 %  on   ;   Device (Oxygen Therapy): room air  Body mass index is 29.94 kg/m².    Physical Exam   Constitutional: She appears well-developed and well-nourished.   HENT:   Head: Normocephalic and atraumatic.   Cardiovascular: Normal rate and regular rhythm.   No murmur heard.  Pulmonary/Chest: Effort normal and breath sounds normal.   Abdominal: Soft. Bowel sounds are normal. She exhibits no distension. There is no tenderness.   Musculoskeletal:   Left arm in a sling   Neurological: She is alert.   Skin: Skin is warm and dry.       Results Review:    Glucose   Date Value Ref Range Status   12/16/2018 216 (H) 65 - 99 mg/dL Final   12/14/2018 225 (H) 65 - 99 mg/dL Final   12/13/2018 608 (C) 65 - 99 mg/dL Final     Results from last 7 days   Lab Units  12/14/18   0448   WBC 10*3/mm3  3.15*   HEMOGLOBIN g/dL  9.6*   HEMATOCRIT %  30.4*   PLATELETS 10*3/mm3  100*     Results from last 7 days   Lab Units  12/16/18   0433   SODIUM mmol/L  138   POTASSIUM mmol/L  4.0   CHLORIDE mmol/L  99   CO2 mmol/L  26.4   BUN mg/dL  11   CREATININE mg/dL  0.81   CALCIUM mg/dL  8.9   BILIRUBIN mg/dL  0.8   ALK PHOS U/L  202*   ALT (SGPT) U/L  22   AST (SGOT) U/L  26   GLUCOSE mg/dL  216*                 Cultures:       I have reviewed daily medications and changes in CPOE    Scheduled meds    amitriptyline 50 mg Oral Nightly   cholecalciferol 1,000 Units Oral Daily   cyclobenzaprine 5 mg Oral Nightly    DULoxetine 90 mg Oral Daily   folic acid 1 mg Oral Daily   furosemide 40 mg Oral Daily   insulin glargine 32 Units Subcutaneous Q12H   insulin lispro 0-24 Units Subcutaneous 4x Daily With Meals & Nightly   insulin lispro 10 Units Subcutaneous TID With Meals   levETIRAcetam 500 mg Oral Q12H   multivitamin with minerals 1 tablet Oral Daily   pantoprazole 40 mg Oral QAM   pregabalin 75 mg Oral Q12H   rifaximin 550 mg Oral BID   sodium chloride 3 mL Intravenous Q12H   spironolactone 100 mg Oral Daily   sucralfate 1 g Oral BID   trimethobenzamide 300 mg Oral TID   vitamin B-12 1,000 mcg Oral Daily          PRN meds  •  ALPRAZolam  •  cyclobenzaprine  •  dextrose  •  dextrose  •  glucagon (human recombinant)  •  hydrOXYzine  •  ondansetron **OR** ondansetron ODT **OR** ondansetron  •  oxyCODONE  •  promethazine  •  sodium chloride  •  [COMPLETED] Insert peripheral IV **AND** sodium chloride        Type 2 diabetes mellitus with hyperglycemia, with long-term current use of insulin (CMS/HCC)    Thrombocytopenia (CMS/HCC)    Cirrhosis of liver (CMS/HCC)    Rheumatoid arthritis (CMS/HCC)    Anxiety and depression    DUKES (nonalcoholic steatohepatitis)    Hyperlipidemia    BRICE (obstructive sleep apnea)    Gastroparesis    Hyperammonemia (CMS/HCC)    Hyponatremia    Anemia    Vitamin D insufficiency        Assessment/Plan:  Choking spells- esophagram today, consider EGD, d/w Dr dean    Type 2 diabetes mellitus with hyperglycemia, with long-term current use of insulin (CMS/HCC)  -Blood sugars are much better controlled compared to admission.  But still running 200s to 300s.  -Endocrinology is following and they have increased her Lantus to 55 and are continuing with Humalog 10 with meals plus sliding scale insulin.  -at home se is on  65 with tid, 30 with snacks      Thrombocytopenia (CMS/HCC)  -platelets are stable      Cirrhosis of liver (CMS/HCC)  -LFT stable      Rheumatoid arthritis (CMS/HCC)  -she was on embrel  and last dose was in May  -She sees Dr Munoz      Anxiety and depression      DUKES (nonalcoholic steatohepatitis) /  Hyperammonemia (CMS/HCC)  -lasix and spironolactone  -xifaxan      Hyperlipidemia      BRICE (obstructive sleep apnea)      Gastroparesis  -tigan for nausea      Hyponatremia    Chronic pain medication  -lyrica, flexiri, cymbalta, elavil      Anemia  -Hemoglobin level is slightly lower than baseline about 10    Seizure-keppra, no seizure in a long time, probably 15 years    Plan:  Choking spell yesterday    Jose Almanza MD  12/16/18  11:19 AM

## 2018-12-16 NOTE — PLAN OF CARE
Problem: Patient Care Overview  Goal: Plan of Care Review  Outcome: Ongoing (interventions implemented as appropriate)   12/16/18 0321   Coping/Psychosocial   Plan of Care Reviewed With patient   OTHER   Outcome Summary Pt A&Ox4, reports pain as constant, PO painmeds given per orders, some relief, Lf arm in sling and wrapped against body with ace wrap per pt preference, standby assit for ambulation, heart rate tacky most of shift, accu checks done and tx given as needed, diet Cl Liquids, reports swallowing issues when taking pills butwas able to manage them, xanax given at bedtime   Plan of Care Review   Progress improving       Problem: Fall Risk (Adult)  Goal: Absence of Fall  Outcome: Ongoing (interventions implemented as appropriate)      Problem: Skin Injury Risk (Adult)  Goal: Skin Health and Integrity  Outcome: Ongoing (interventions implemented as appropriate)      Problem: Hyperglycemia, Persistent (Adult)  Goal: Signs and Symptoms of Listed Potential Problems Will be Absent, Minimized or Managed (Hyperglycemia, Persistent)  Outcome: Ongoing (interventions implemented as appropriate)

## 2018-12-16 NOTE — PROGRESS NOTES
[facility]  Inpatient Progress Note    Reason for Follow Up:  I was called to see her today because of choking on food    Subjective     Interval History:   She had an issue this morning with choking on eggs, she tells me she occasionally does get dysphagia.  EGD October 2018 shows no evidence of ring or stricture    Current Facility-Administered Medications:   •  ALPRAZolam (XANAX) tablet 0.5 mg, 0.5 mg, Oral, BID PRN, Jose Morales MD, 0.5 mg at 12/15/18 2047  •  amitriptyline (ELAVIL) tablet 50 mg, 50 mg, Oral, Nightly, Karlene Del Cid APRN, 50 mg at 12/15/18 2046  •  cholecalciferol (VITAMIN D3) tablet 1,000 Units, 1,000 Units, Oral, Daily, Dionisio Altman MD, 1,000 Units at 12/16/18 1140  •  cyclobenzaprine (FLEXERIL) tablet 5 mg, 5 mg, Oral, Nightly, Karlene Del Cid APRN, 5 mg at 12/15/18 2046  •  cyclobenzaprine (FLEXERIL) tablet 5 mg, 5 mg, Oral, TID PRN, Jose Almanza MD  •  dextrose (D50W) 25 g/ 50mL Intravenous Solution 25 g, 25 g, Intravenous, Q15 Min PRN, Karlene Del Cid APRN  •  dextrose (GLUTOSE) oral gel 15 g, 15 g, Oral, Q15 Min PRN, Karlene Del Cid APRN  •  DULoxetine (CYMBALTA) DR capsule 90 mg, 90 mg, Oral, Daily, Karlene Del Cid APRN, 90 mg at 12/16/18 0823  •  folic acid (FOLVITE) tablet 1 mg, 1 mg, Oral, Daily, Karlene Del Cid APRN, 1 mg at 12/16/18 0823  •  [START ON 12/18/2018] furosemide (LASIX) tablet 40 mg, 40 mg, Oral, Daily, Jose Almanza MD  •  glucagon (human recombinant) (GLUCAGEN DIAGNOSTIC) injection 1 mg, 1 mg, Subcutaneous, PRN, Karlene Del Cid APRN  •  hydrOXYzine (ATARAX) tablet 50 mg, 50 mg, Oral, TID PRN, Karlene Del Cid, JOEL  •  insulin glargine (LANTUS) injection 32 Units, 32 Units, Subcutaneous, Q12H, Dionisio Altman MD  •  insulin lispro (humaLOG) injection 0-24 Units, 0-24 Units, Subcutaneous, 4x Daily With Meals & Nightly, Karlene Del Cid APRN, 4 Units at 12/16/18 1141  •  insulin lispro (humaLOG) injection 10 Units, 10  Units, Subcutaneous, TID With Meals, Merary Burnett MD, 10 Units at 12/16/18 0823  •  levETIRAcetam (KEPPRA) tablet 500 mg, 500 mg, Oral, Q12H, Karlene Del Cid, APRN, 500 mg at 12/16/18 0823  •  multivitamin with minerals 1 tablet, 1 tablet, Oral, Daily, Karlene Del Cid, APRN, 1 tablet at 12/16/18 0823  •  ondansetron (ZOFRAN) tablet 4 mg, 4 mg, Oral, Q6H PRN, 4 mg at 12/16/18 0019 **OR** ondansetron ODT (ZOFRAN-ODT) disintegrating tablet 4 mg, 4 mg, Oral, Q6H PRN **OR** ondansetron (ZOFRAN) injection 4 mg, 4 mg, Intravenous, Q6H PRN, Karlene Del Cid, APRN, 4 mg at 12/15/18 1553  •  oxyCODONE (ROXICODONE) immediate release tablet 7.5 mg, 7.5 mg, Oral, Q8H PRN, Jose Morales MD, 7.5 mg at 12/16/18 0822  •  pantoprazole (PROTONIX) EC tablet 40 mg, 40 mg, Oral, QAM, Karlene Del Cid, APRN, 40 mg at 12/16/18 0621  •  pregabalin (LYRICA) capsule 75 mg, 75 mg, Oral, Q12H, Jose Morales MD, 75 mg at 12/16/18 0822  •  promethazine (PHENERGAN) tablet 25 mg, 25 mg, Oral, Q6H PRN, Karlene Del Cid, APRN, 25 mg at 12/16/18 1202  •  rifaximin (XIFAXAN) tablet 550 mg, 550 mg, Oral, BID, Karlene Del Cid, APRN, 550 mg at 12/16/18 0823  •  sodium chloride 0.9 % flush 1-10 mL, 1-10 mL, Intravenous, PRN, Karlene Del Cid E, APRN  •  [COMPLETED] Insert peripheral IV, , , Once **AND** sodium chloride 0.9 % flush 10 mL, 10 mL, Intravenous, PRN, Anisha East, APRN, 10 mL at 12/14/18 0129  •  sodium chloride 0.9 % flush 3 mL, 3 mL, Intravenous, Q12H, Karlene Del Cid APRN, 3 mL at 12/15/18 2100  •  [START ON 12/18/2018] spironolactone (ALDACTONE) tablet 100 mg, 100 mg, Oral, Daily, Jose Almanza MD  •  sucralfate (CARAFATE) tablet 1 g, 1 g, Oral, BID, Karlene Del Cid APRN, 1 g at 12/16/18 0823  •  trimethobenzamide (TIGAN) capsule 300 mg, 300 mg, Oral, TID, Karlene Del Cid APRN, 300 mg at 12/16/18 0822  •  vitamin B-12 (CYANOCOBALAMIN) tablet 1,000 mcg, 1,000 mcg, Oral, Daily, Karlene Del Cid APRN, 1,000 mcg at 12/16/18  0823  Review of Systems:    Has weakness and fatigue all other systems reviewed and negative    Objective     Vital Signs  Temp:  [98 °F (36.7 °C)-98.4 °F (36.9 °C)] 98.1 °F (36.7 °C)  Heart Rate:  [] 105  Resp:  [16] 16  BP: (112-126)/(64-69) 114/65  Body mass index is 29.94 kg/m².    Intake/Output Summary (Last 24 hours) at 12/16/2018 1424  Last data filed at 12/16/2018 1340  Gross per 24 hour   Intake 917 ml   Output 2500 ml   Net -1583 ml     I/O this shift:  In: 440 [P.O.:440]  Out: 600 [Urine:600]     Physical Exam:   General: patient awake, alert and cooperative   Eyes: Normal lids and lashes, no scleral icterus   Neck: supple, normal ROM   Skin: warm and dry, not jaundiced   Cardiovascular: regular rhythm and rate, no murmurs auscultated   Pulm: clear to auscultation bilaterally, regular and unlabored   Abdomen: soft, nontender, nondistended; normal bowel sounds   Rectal: deferred   Extremities: no rash or edema   Psychiatric: Normal mood and behavior; memory intact     Results Review:     I reviewed the patient's new clinical results.    Results from last 7 days   Lab Units  12/14/18   0448  12/13/18   1900   WBC 10*3/mm3  3.15*  2.88*   HEMOGLOBIN g/dL  9.6*  10.1*   HEMATOCRIT %  30.4*  34.0*   PLATELETS 10*3/mm3  100*  99*     Results from last 7 days   Lab Units  12/16/18   0433  12/14/18   0448  12/13/18   1900   SODIUM mmol/L  138  135*  131*   POTASSIUM mmol/L  4.0  3.8  4.8   CHLORIDE mmol/L  99  99  95*   CO2 mmol/L  26.4  22.5  21.9*   BUN mg/dL  11  9  11   CREATININE mg/dL  0.81  0.61  0.87   CALCIUM mg/dL  8.9  9.0  8.9   BILIRUBIN mg/dL  0.8  0.7  0.8   ALK PHOS U/L  202*  215*  247*   ALT (SGPT) U/L  22  25  31   AST (SGOT) U/L  26  34*  39*   GLUCOSE mg/dL  216*  225*  608*         Lab Results   Lab Value Date/Time    LIPASE 8 (L) 12/13/2018 1900    LIPASE 12 (L) 10/02/2018 2332    LIPASE 9 (L) 05/18/2018 0425    LIPASE 22 12/16/2017 1650    LIPASE 10 (L) 10/30/2017 1732    LIPASE 9  (L) 02/21/2017 1202    LIPASE 16 11/10/2014 0724       Radiology:  XR Chest 2 View   Final Result      XR Shoulder 2+ View Left   Final Result      XR Humerus Left   Final Result      FL Esophagram Complete    (Results Pending)       Assessment/Plan     Patient Active Problem List   Diagnosis   • Cirrhosis of liver (CMS/HCC)   • Rheumatoid arthritis (CMS/HCC)   • Anxiety and depression   • Type 2 diabetes mellitus, uncontrolled, with neuropathy (CMS/HCC)   • Fibrositis   • Change in blood platelet count   • Pancytopenia (CMS/HCC)   • Hypersplenism   • Nausea   • DUKES (nonalcoholic steatohepatitis)   • Hyperlipidemia   • Abdominal pain   • Hematemesis   • Ascites   • Portal hypertension (CMS/HCC)   • Systemic lupus (CMS/HCC)   • Secondary esophageal varices without bleeding (CMS/HCC)   • Gastroparesis   • Cirrhosis of liver with ascites (CMS/HCC)   • Diabetic ketoacidosis without coma associated with type 2 diabetes mellitus (CMS/HCC)   • Diabetic peripheral neuropathy (CMS/HCC)   • History of seizure disorder   • Hepatic encephalopathy (CMS/HCC)   • Abnormal finding of blood chemistry    • Thrombocytopenia (CMS/HCC)   • Fever   • Acute ITP (CMS/HCC)   • Degeneration of lumbosacral intervertebral disc   • Seizure (CMS/HCC)   • Spondylosis without myelopathy   • Intractable vomiting with nausea   • UGI bleed   • Migraine without aura   • Urinary retention   • Type 2 diabetes mellitus with hyperglycemia, with long-term current use of insulin (CMS/HCC)   • BRICE (obstructive sleep apnea)   • Gastroparesis   • Hyperammonemia (CMS/HCC)   • Hyponatremia   • Anemia   • Vitamin D insufficiency         Choked on food last night and a bit this morning  This happens intermittently  EGD reviewed from October with grade 1 varices otherwise negative      Assessment/Plan    No Indication for repeat EGD  I'll obtain barium esophagram  I would imagine this is either a transfer dysphagia or if it truly is transport dysphagia it is  likely from esophageal spasms, continue Elavil  and PPI    I discussed the patients findings and my recommendations with patient, nursing staff and primary care team.    Los Knutson MD

## 2018-12-16 NOTE — PROGRESS NOTES
"  ENDOCRINE    Subjective   AND PLANS  Silvia Zabala is a 58 y.o. female.     Follow-up diabetes and related disorders.    No nausea or vomiting.  Tolerating liquids.  Fasting glucose 233.  Random glucose 247-296.  Will change from Lantus 55 units at bedtime to Lantus 30 units twice a day.  Continue Humalog 10 units with each meal plus sliding scale.    Vitamin D insufficient.  Start vitamin D3 1000 units per day.    .  HDL 53.  Normal thyroid function tests.  Continue on diet alone for now    Objective   /64 (BP Location: Left arm, Patient Position: Lying)   Pulse 97   Temp 98.4 °F (36.9 °C) (Oral)   Resp 16   Ht 168.9 cm (66.5\")   Wt 85.4 kg (188 lb 4.4 oz)   SpO2 92%   BMI 29.94 kg/m²   Physical Exam    Awake, alert, not in distress.  Pink conjunctiva.  Sclerae not icteric.  No rales or wheezes.  Regular heart rate and rhythm.  Abdomen soft, globular, nontender.  Left upper extremity on sling and swath  No cyanosis or clubbing.  Trace leg edema.  No calf tenderness    Lab Results (last 24 hours)     Procedure Component Value Units Date/Time    Comprehensive Metabolic Panel [[id number]] Collected:  12/16/18 0433    Specimen:  Blood Updated:  12/16/18 1006    POC Glucose Once [[id number]]  (Abnormal) Collected:  12/16/18 0604    Specimen:  Blood Updated:  12/16/18 0606     Glucose 233 mg/dL     Vitamin D 25 Hydroxy [[id number]]  (Abnormal) Collected:  12/16/18 0433    Specimen:  Blood Updated:  12/16/18 0559     25 Hydroxy, Vitamin D 27.8 ng/ml     Narrative:       Reference Range for Total Vitamin D 25(OH)     Deficiency    <20.0 ng/mL   Insufficiency 21-29 ng/mL   Sufficiency    ng/mL  Toxicity      >100 ng/ml         Lipid Panel [[id number]]  (Abnormal) Collected:  12/16/18 0433    Specimen:  Blood Updated:  12/16/18 0538     Total Cholesterol 173 mg/dL      Triglycerides 65 mg/dL      HDL Cholesterol 53 mg/dL      LDL Cholesterol  107 mg/dL      VLDL Cholesterol 13 mg/dL      LDL/HDL " Ratio 2.02    Narrative:       Cholesterol Reference Ranges  (U.S. Department of Health and Human Services ATP III Classifications)    Desirable          <200 mg/dL  Borderline High    200-239 mg/dL  High Risk          >240 mg/dL      Triglyceride Reference Ranges  (U.S. Department of Health and Human Services ATP III Classifications)    Normal           <150 mg/dL  Borderline High  150-199 mg/dL  High             200-499 mg/dL  Very High        >500 mg/dL    HDL Reference Ranges  (U.S. Department of Health and Human Services ATP III Classifcations)    Low     <40 mg/dl (major risk factor for CHD)  High    >60 mg/dl ('negative' risk factor for CHD)        LDL Reference Ranges  (U.S. Department of Health and Human Services ATP III Classifcations)    Optimal          <100 mg/dL  Near Optimal     100-129 mg/dL  Borderline High  130-159 mg/dL  High             160-189 mg/dL  Very High        >189 mg/dL    Insulin Antibody [[id number]] Collected:  12/16/18 0433    Specimen:  Blood Updated:  12/16/18 0504    IA-2 Autoantibodies [[id number]] Collected:  12/16/18 0433    Specimen:  Blood Updated:  12/16/18 0504    Anti-islet Cell Antibody [[id number]] Collected:  12/16/18 0433    Specimen:  Blood Updated:  12/16/18 0504    C-Peptide [[id number]] Collected:  12/16/18 0433    Specimen:  Blood Updated:  12/16/18 0504    Glutamic Acid Decarboxylase [[id number]] Collected:  12/16/18 0433    Specimen:  Blood Updated:  12/16/18 0504    POC Glucose Once [[id number]]  (Abnormal) Collected:  12/15/18 2120    Specimen:  Blood Updated:  12/15/18 2121     Glucose 296 mg/dL     POC Glucose Once [[id number]]  (Abnormal) Collected:  12/15/18 1609    Specimen:  Blood Updated:  12/15/18 1610     Glucose 247 mg/dL     Microalbumin / Creatinine Urine Ratio - Urine, Clean Catch [[id number]] Collected:  12/15/18 1057    Specimen:  Urine, Clean Catch Updated:  12/15/18 1200     Microalbumin/Creatinine Ratio -- mg/g      Comment: Unable to calculate         Creatinine, Urine 25.0 mg/dL      Microalbumin, Urine <1.2 mg/L     TSH [[id number]]  (Normal) Collected:  12/14/18 0448    Specimen:  Blood Updated:  12/15/18 1119     TSH 1.550 mIU/mL     T4, Free [[id number]]  (Abnormal) Collected:  12/14/18 0448    Specimen:  Blood Updated:  12/15/18 1119     Free T4 0.85 ng/dL     POC Glucose Once [[id number]]  (Abnormal) Collected:  12/15/18 1109    Specimen:  Blood Updated:  12/15/18 1110     Glucose 335 mg/dL               Type 2 diabetes mellitus with hyperglycemia, with long-term current use of insulin (CMS/HCC)    Cirrhosis of liver (CMS/HCC)    Rheumatoid arthritis (CMS/HCC)    Anxiety and depression    DUKES (nonalcoholic steatohepatitis)    Hyperlipidemia    Thrombocytopenia (CMS/HCC)    BRICE (obstructive sleep apnea)    Gastroparesis    Hyperammonemia (CMS/HCC)    Hyponatremia    Anemia    Vitamin D insufficiency    Insulin therapy as discussed above.  Start vitamin D3.  Continue low-fat diet.

## 2018-12-17 ENCOUNTER — RESULTS ENCOUNTER (OUTPATIENT)
Dept: GASTROENTEROLOGY | Facility: CLINIC | Age: 58
End: 2018-12-17

## 2018-12-17 DIAGNOSIS — K76.82 HEPATIC ENCEPHALOPATHY (HCC): ICD-10-CM

## 2018-12-17 DIAGNOSIS — R11.2 INTRACTABLE VOMITING WITH NAUSEA, UNSPECIFIED VOMITING TYPE: ICD-10-CM

## 2018-12-17 DIAGNOSIS — K76.6 PORTAL HYPERTENSION (HCC): ICD-10-CM

## 2018-12-17 DIAGNOSIS — R18.8 CIRRHOSIS OF LIVER WITH ASCITES, UNSPECIFIED HEPATIC CIRRHOSIS TYPE (HCC): ICD-10-CM

## 2018-12-17 DIAGNOSIS — K74.60 CIRRHOSIS OF LIVER WITH ASCITES, UNSPECIFIED HEPATIC CIRRHOSIS TYPE (HCC): ICD-10-CM

## 2018-12-17 DIAGNOSIS — K75.81 NASH (NONALCOHOLIC STEATOHEPATITIS): ICD-10-CM

## 2018-12-17 DIAGNOSIS — IMO0002 TYPE 2 DIABETES MELLITUS, UNCONTROLLED, WITH NEUROPATHY: ICD-10-CM

## 2018-12-17 DIAGNOSIS — D61.818 PANCYTOPENIA (HCC): ICD-10-CM

## 2018-12-17 LAB
BASOPHILS # BLD AUTO: 0.01 10*3/MM3 (ref 0–0.2)
BASOPHILS NFR BLD AUTO: 0.4 % (ref 0–1.5)
C PEPTIDE SERPL-MCNC: 1 NG/ML (ref 1.1–4.4)
DEPRECATED RDW RBC AUTO: 52.1 FL (ref 37–54)
EOSINOPHIL # BLD AUTO: 0.11 10*3/MM3 (ref 0–0.7)
EOSINOPHIL NFR BLD AUTO: 3.9 % (ref 0.3–6.2)
ERYTHROCYTE [DISTWIDTH] IN BLOOD BY AUTOMATED COUNT: 16.9 % (ref 11.7–13)
GLUCOSE BLDC GLUCOMTR-MCNC: 110 MG/DL (ref 70–130)
GLUCOSE BLDC GLUCOMTR-MCNC: 185 MG/DL (ref 70–130)
GLUCOSE BLDC GLUCOMTR-MCNC: 199 MG/DL (ref 70–130)
GLUCOSE BLDC GLUCOMTR-MCNC: 203 MG/DL (ref 70–130)
HCT VFR BLD AUTO: 33.2 % (ref 35.6–45.5)
HGB BLD-MCNC: 10.3 G/DL (ref 11.9–15.5)
IMM GRANULOCYTES # BLD: 0.01 10*3/MM3 (ref 0–0.03)
IMM GRANULOCYTES NFR BLD: 0.4 % (ref 0–0.5)
LYMPHOCYTES # BLD AUTO: 0.64 10*3/MM3 (ref 0.9–4.8)
LYMPHOCYTES NFR BLD AUTO: 22.9 % (ref 19.6–45.3)
MCH RBC QN AUTO: 26.3 PG (ref 26.9–32)
MCHC RBC AUTO-ENTMCNC: 31 G/DL (ref 32.4–36.3)
MCV RBC AUTO: 84.7 FL (ref 80.5–98.2)
MONOCYTES # BLD AUTO: 0.4 10*3/MM3 (ref 0.2–1.2)
MONOCYTES NFR BLD AUTO: 14.3 % (ref 5–12)
NEUTROPHILS # BLD AUTO: 1.63 10*3/MM3 (ref 1.9–8.1)
NEUTROPHILS NFR BLD AUTO: 58.5 % (ref 42.7–76)
NRBC BLD MANUAL-RTO: 0 /100 WBC (ref 0–0)
PLATELET # BLD AUTO: 109 10*3/MM3 (ref 140–500)
PMV BLD AUTO: 10.4 FL (ref 6–12)
RBC # BLD AUTO: 3.92 10*6/MM3 (ref 3.9–5.2)
WBC NRBC COR # BLD: 2.79 10*3/MM3 (ref 4.5–10.7)

## 2018-12-17 PROCEDURE — 97110 THERAPEUTIC EXERCISES: CPT

## 2018-12-17 PROCEDURE — 63710000001 INSULIN GLARGINE PER 5 UNITS: Performed by: INTERNAL MEDICINE

## 2018-12-17 PROCEDURE — G0378 HOSPITAL OBSERVATION PER HR: HCPCS

## 2018-12-17 PROCEDURE — 99213 OFFICE O/P EST LOW 20 MIN: CPT | Performed by: INTERNAL MEDICINE

## 2018-12-17 PROCEDURE — 85025 COMPLETE CBC W/AUTO DIFF WBC: CPT | Performed by: HOSPITALIST

## 2018-12-17 PROCEDURE — 63710000001 INSULIN LISPRO (HUMAN) PER 5 UNITS: Performed by: NURSE PRACTITIONER

## 2018-12-17 PROCEDURE — 82962 GLUCOSE BLOOD TEST: CPT

## 2018-12-17 PROCEDURE — 63710000001 PROMETHAZINE PER 25 MG: Performed by: NURSE PRACTITIONER

## 2018-12-17 PROCEDURE — 63710000001 INSULIN LISPRO (HUMAN) PER 5 UNITS: Performed by: INTERNAL MEDICINE

## 2018-12-17 RX ORDER — LEVETIRACETAM 500 MG/1
TABLET ORAL
Qty: 60 TABLET | Refills: 0 | Status: SHIPPED | OUTPATIENT
Start: 2018-12-17 | End: 2019-01-16 | Stop reason: SDUPTHER

## 2018-12-17 RX ADMIN — INSULIN LISPRO 4 UNITS: 100 INJECTION, SOLUTION INTRAVENOUS; SUBCUTANEOUS at 12:10

## 2018-12-17 RX ADMIN — FOLIC ACID 1 MG: 1 TABLET ORAL at 09:25

## 2018-12-17 RX ADMIN — LEVETIRACETAM 500 MG: 500 TABLET, FILM COATED ORAL at 21:10

## 2018-12-17 RX ADMIN — SUCRALFATE 1 G: 1 TABLET ORAL at 21:10

## 2018-12-17 RX ADMIN — RIFAXIMIN 550 MG: 550 TABLET ORAL at 09:26

## 2018-12-17 RX ADMIN — TRIMETHOBENZAMIDE HYDROCHLORIDE 300 MG: 300 CAPSULE ORAL at 09:25

## 2018-12-17 RX ADMIN — OXYCODONE HYDROCHLORIDE 7.5 MG: 15 TABLET ORAL at 18:42

## 2018-12-17 RX ADMIN — PROMETHAZINE HYDROCHLORIDE 25 MG: 25 TABLET ORAL at 11:00

## 2018-12-17 RX ADMIN — PREGABALIN 75 MG: 25 CAPSULE ORAL at 09:25

## 2018-12-17 RX ADMIN — VITAMIN D, TAB 1000IU (100/BT) 1000 UNITS: 25 TAB at 09:26

## 2018-12-17 RX ADMIN — TRIMETHOBENZAMIDE HYDROCHLORIDE 300 MG: 300 CAPSULE ORAL at 15:54

## 2018-12-17 RX ADMIN — INSULIN LISPRO 10 UNITS: 100 INJECTION, SOLUTION INTRAVENOUS; SUBCUTANEOUS at 09:22

## 2018-12-17 RX ADMIN — AMITRIPTYLINE HYDROCHLORIDE 50 MG: 50 TABLET, FILM COATED ORAL at 21:10

## 2018-12-17 RX ADMIN — INSULIN GLARGINE 32 UNITS: 100 INJECTION, SOLUTION SUBCUTANEOUS at 09:22

## 2018-12-17 RX ADMIN — INSULIN GLARGINE 32 UNITS: 100 INJECTION, SOLUTION SUBCUTANEOUS at 21:11

## 2018-12-17 RX ADMIN — DULOXETINE HYDROCHLORIDE 90 MG: 60 CAPSULE, DELAYED RELEASE ORAL at 09:25

## 2018-12-17 RX ADMIN — LEVETIRACETAM 500 MG: 500 TABLET, FILM COATED ORAL at 09:26

## 2018-12-17 RX ADMIN — INSULIN LISPRO 10 UNITS: 100 INJECTION, SOLUTION INTRAVENOUS; SUBCUTANEOUS at 12:09

## 2018-12-17 RX ADMIN — OXYCODONE HYDROCHLORIDE 7.5 MG: 15 TABLET ORAL at 10:49

## 2018-12-17 RX ADMIN — CYCLOBENZAPRINE 5 MG: 10 TABLET, FILM COATED ORAL at 21:11

## 2018-12-17 RX ADMIN — RIFAXIMIN 550 MG: 550 TABLET ORAL at 21:10

## 2018-12-17 RX ADMIN — PREGABALIN 75 MG: 25 CAPSULE ORAL at 21:10

## 2018-12-17 RX ADMIN — TRIMETHOBENZAMIDE HYDROCHLORIDE 300 MG: 300 CAPSULE ORAL at 21:10

## 2018-12-17 RX ADMIN — MULTIPLE VITAMINS W/ MINERALS TAB 1 TABLET: TAB at 09:26

## 2018-12-17 RX ADMIN — SODIUM CHLORIDE, PRESERVATIVE FREE 3 ML: 5 INJECTION INTRAVENOUS at 09:27

## 2018-12-17 RX ADMIN — INSULIN LISPRO 10 UNITS: 100 INJECTION, SOLUTION INTRAVENOUS; SUBCUTANEOUS at 18:00

## 2018-12-17 RX ADMIN — SUCRALFATE 1 G: 1 TABLET ORAL at 09:26

## 2018-12-17 RX ADMIN — PROMETHAZINE HYDROCHLORIDE 25 MG: 25 TABLET ORAL at 18:42

## 2018-12-17 RX ADMIN — PANTOPRAZOLE SODIUM 40 MG: 40 TABLET, DELAYED RELEASE ORAL at 09:25

## 2018-12-17 RX ADMIN — SODIUM CHLORIDE, PRESERVATIVE FREE 3 ML: 5 INJECTION INTRAVENOUS at 22:10

## 2018-12-17 RX ADMIN — INSULIN LISPRO 8 UNITS: 100 INJECTION, SOLUTION INTRAVENOUS; SUBCUTANEOUS at 21:19

## 2018-12-17 RX ADMIN — Medication 1000 MCG: at 09:26

## 2018-12-17 RX ADMIN — INSULIN LISPRO 4 UNITS: 100 INJECTION, SOLUTION INTRAVENOUS; SUBCUTANEOUS at 09:25

## 2018-12-17 NOTE — PAYOR COMM NOTE
"Silvia Luther (58 y.o. Female)     PLEASE SEE ATTACHED CLINICAL REVIEW. PT. WAS ADMITTED TO [facility] ON 12/13/18.    REF#[id number]    PLEASE CALL   OR  [phone] WITH INPT AUTH AND DAYS APPROVED. [facility] IS DR[facility] WITH [facility].     THANK YOU    SRINI DOMINGO LPN CCP    Date of Birth Social Security Number Address Home Phone MRN    1960  [id number]    [place] [address] [phone] [id number]    Taoist Marital Status          None        Admission Date Admission Type Admitting Provider Attending Provider Department, Room/Bed    12/13/18 Emergency Jsoe Morales MD Edling, Stephen A, MD [address], [address]    Discharge Date Discharge Disposition Discharge Destination                       Attending Provider:  Jose Almanza MD    Allergies:  Albuterol, Tramadol, Lactulose, Quinine Derivatives    Isolation:  None   Infection:  None   Code Status:  CPR    Ht:  168.9 cm (66.5\")   Wt:  85.4 kg (188 lb 4.4 oz)    Admission Cmt:  None   Principal Problem:  Type 2 diabetes mellitus with hyperglycemia, with long-term current use of insulin (CMS/[facility]) [E11.65,Z79.4]                 Active Insurance as of 12/13/2018     Primary Coverage     Payor Plan Insurance Group Employer/Plan Group    [facility] PPO [id number]     Payor Plan Address Payor Plan Phone Number Payor Plan Fax Number Effective Dates    [address] [phone]  1/1/2015 - None Entered    [facility] [id number]       Subscriber Name Subscriber Birth Date Member ID       JOSE LUTHER [birth year]/1970 [id number]           Secondary Coverage     Payor Plan Insurance Group Employer/Plan Group    MEDICARE MEDICARE A & B      Payor Plan Address Payor Plan Phone Number Payor Plan Fax Number Effective Dates    [address] [phone]  9/1/2003 - None Entered    [facility] [id number]       Subscriber Name Subscriber Birth Date Member ID       " SILVIA LUTHER 1960 [id number]                 Emergency Contacts      (Rel.) Home Phone Work Phone Mobile Phone    Jose Luther (Spouse) [phone] -- [phone]    Kary Luther (Mother In Law) (Relative) [phone] -- [phone]    Mack Lora (Father) [phone] -- [phone]               History & Physical      Jose Morales MD at 2018  9:50 PM              Name: Silvia Luther ADMIT: 2018   : 1960  PCP: Blanca Pitts MD    MRN: [id number] LOS: 0 days   AGE/SEX: 58 y.o. female  ROOM:      Chief Complaint   Patient presents with   • Hyperglycemia       Subjective     Ms. Luther is a 58 y.o. female with a history of DUKES, cirrhosis, depression, DM1, seizure, BRICE, RA, SLE, HLD, gastroparesis that presents to [facility] complaining of elevated blood glucose and nausea. Patient states that she has been compliant with insulin and other meds, though despite this and recently being seen by endocrinologist, her blood sugars have remained high. Today she states that her blood glucose monitor wouldn't read her glucose because it was too high, though she reports that this has been ongoing for several months. She also reports nausea without vomiting, but this is also chronic as she does have gastroparesis and was taken off Reglan due to tardive dyskinesia. She additionally reports pain to her left shoulder after fall 4 weeks ago and broken humerus, no surgical intervention warranted. She reports that she does have appointment with orthopedist in January and has been wearing a sling since her injury. She reports decreased appetite, chills, fever, congestion, cough, intermittent shortness of breath and chest pains, abdominal pain, nausea, vomiting, gait instability, incomplete emptying of bladder, back pain, headache, lower extremity swelling and weakness. None of these symptoms are new and she denies any acute changes that actually  brought her in to the hospital [facility]. She additionaly reports that she was taken off of her Wellbutrin and since that time admits to having suicidal thoughts, though denies plan and states that she wouldn't actually harm herself because she loves her , grandchildren and father. Of note, she does report that while in [place] about a month ago, she ran out of insulin and was without for about a week, however, blood sugars had been out of control prior to that time.     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   • Cirrhosis (CMS/HCC)    • Colon polyps    • Deafness    • Depression    • Diabetes mellitus (CMS/HCC)     Adult onset, insulin requiring   • Duodenal ulcer with hemorrhage    • Esophageal varices determined by endoscopy (CMS/HCC)    • Fibromyalgia    • Gallbladder disease    • Gastric varices    • Gastroparesis    • Glaucoma    • Granuloma annulare    • H/O B12 deficiency    • H/O Bleeding ulcer     And gastroesophageal varices   • H/O mixed connective tissue disorder    • Hemorrhoids    • Hiatal hernia    • History of transfusion    • Hx of being hospitalized     In [place] for GI bleeding from ulcer and varices   • Hyperlipidemia    • Migraine    • DUKES (nonalcoholic steatohepatitis) 11/2016   • BRICE (obstructive sleep apnea)    • Pancreas divisum    • Pancytopenia (CMS/HCC)     Chronic, due to cirrhosis and hypersplenism   • Peptic ulcer disease     And esophageal varices   • PONV (postoperative nausea and vomiting)    • RA (rheumatoid arthritis) (CMS/HCC)    • Seizure disorder (CMS/HCC)     LAST ONE SEVERAL YEARS AGO   • Seizures (CMS/HCC)    • Systemic lupus (CMS/HCC)      Past Surgical History:   Procedure Laterality Date   • BLADDER REPAIR   "   • CATARACT EXTRACTION     • CHOLECYSTECTOMY     • ENDOSCOPY AND COLONOSCOPY      Dr. Rich Coleman with findings of candida esophagitis   • EYE SURGERY     • HYSTERECTOMY  1986    partial   • JOINT REPLACEMENT     • KNEE SURGERY Right     \"right knee recontstruction\"   • LIVER BIOPSY  2015   • MASS EXCISION     • STOMACH SURGERY       Family History   Problem Relation Age of Onset   • Liver disease Other    • Depression Other    • Heart disease Other    • Hypertension Other    • Diabetes Other    • Breast cancer Other    • Brain cancer Other    • Uterine cancer Other    • Colon cancer Other    • Leukemia Other    • Colon cancer Maternal Aunt    • Hypertension Mother    • Diabetes type II Mother    • Rheum arthritis Mother    • Liver disease Mother         DUKES   • Heart disease Father    • Diabetes type II Father    • Diabetes type II Sister    • Lupus Sister    • Malig Hyperthermia Neg Hx      Social History     Tobacco Use   • Smoking status: Former Smoker     Packs/day: 0.00     Years: 0.00     Pack years: 0.00     Last attempt to quit: 2015     Years since quittin.9   • Smokeless tobacco: Never Used   Substance Use Topics   • Alcohol use: No   • Drug use: No       (Not in a hospital admission)  Allergies:    Allergies   Allergen Reactions   • Albuterol Anaphylaxis   • Tramadol Nausea Only, Other (See Comments) and GI Intolerance     Per pt causes her \"palsy, meaning shaking and tremors\"    • Lactulose Nausea And Vomiting and Other (See Comments)     Severe abdominal pain   • Quinine Derivatives Nausea And Vomiting       Review of Systems   Constitutional: Positive for activity change, appetite change, chills and fever.   HENT: Positive for congestion and sore throat.    Eyes: Negative.  Negative for visual disturbance.   Respiratory: Positive for cough and shortness of breath.    Cardiovascular: Positive for chest pain and leg swelling.   Gastrointestinal: Positive for abdominal " pain, nausea and vomiting.   Endocrine: Negative.    Genitourinary: Positive for difficulty urinating. Negative for dysuria and frequency.        Feeling of incomplete emptying   Musculoskeletal: Positive for back pain and gait problem.   Skin: Negative.    Allergic/Immunologic: Negative.    Neurological: Positive for weakness and headaches.   Hematological: Negative.  Negative for adenopathy. Does not bruise/bleed easily.   Psychiatric/Behavioral: Positive for suicidal ideas. Negative for agitation, behavioral problems and confusion.        Objective    Vital Signs  Temp:  [98.4 °F (36.9 °C)] 98.4 °F (36.9 °C)  Heart Rate:  [] 98  Resp:  [18] 18  BP: (138-146)/(65-74) 146/73  SpO2:  [98 %-100 %] 99 %  on   ;   Device (Oxygen Therapy): room air  Body mass index is 28.03 kg/m².    Physical Exam   Constitutional: She is oriented to person, place, and time. She appears well-developed and well-nourished. She has a sickly appearance. No distress.   HENT:   Head: Normocephalic and atraumatic.   Mouth/Throat: She has dentures (not in place at this time).   Eyes: EOM are normal. Pupils are equal, round, and reactive to light.   Neck: Normal range of motion. Neck supple.   Cardiovascular: Intact distal pulses. Tachycardia present.   Pulmonary/Chest: Effort normal and breath sounds normal. No respiratory distress.   Abdominal: Soft. Bowel sounds are normal. She exhibits distension. There is generalized tenderness. There is no rigidity and no guarding.   Musculoskeletal: She exhibits edema (1+ pitting edema to lower extremities). She exhibits no tenderness.   Decreased ROM left arm due to previous fracture   Neurological: She is alert and oriented to person, place, and time.   Skin: Skin is warm and dry. She is not diaphoretic.   Psychiatric: She has a normal mood and affect. Her behavior is normal. Judgment and thought content normal. She expresses no suicidal plans.   Nursing note and vitals  reviewed.    Assessment/Plan      Active Hospital Problems    Diagnosis Date Noted   • **Type 2 diabetes mellitus with hyperglycemia, with long-term current use of insulin (CMS/HCC) [E11.65, Z79.4] 12/13/2018   • BRICE (obstructive sleep apnea) [G47.33] 12/13/2018   • Gastroparesis [K31.84] 12/13/2018   • Hyperammonemia (CMS/HCC) [E72.20] 12/13/2018   • Hyponatremia [E87.1] 12/13/2018   • Anemia [D64.9] 12/13/2018   • Hyperlipidemia [E78.5]    • DUKES (nonalcoholic steatohepatitis) [K75.81] 06/14/2016   • Cirrhosis of liver (CMS/HCC) [K74.60] 03/08/2016   • Rheumatoid arthritis (CMS/HCC) [M06.9] 03/08/2016   • Anxiety and depression [F41.9, F32.9] 03/08/2016      Resolved Hospital Problems   No resolved problems to display.     DM2/Nausea/vomiting  -blood glucose as high as 608 in ED tonight but reportedly been very high for several months  -will consult endocrinology to see  -a1c 9.2  -gastroparesis causing chronic nausea/vomiting but taken off reglan, will order PRN zofran  -IVF     Hyperammonemia  -can't take lactulose due to reported drug allergy but no signs of hepatic encephalopathy on exam    BRICE/HLD/RA/DUKES/cirrhosis  -no new complaints, will continue home medications as prescribed    Anxiety/depression  -she does report stopping wellbutrin and some suicidal thoughts without plan  -will have access to see    VTE Ppx  -SCDs    CODE status  -full    I discussed the patients findings and my recommendations with patient and family.    JOEL Wade  12/13/18  9:50 PM    Addendum: I have reviewed the history and plan as obtained by JOEL Garcia. I have personally examined the patient. My exam confirms her physical findings and I agree with the plan as listed above, with the addition to the following:      This is somewhat of a sad situation.  She has multiple medical comorbidities and doesn't do a very good job of managing these in the outpatient setting.  Essentially she is being admitted because the ER  "didn't feel comfortable letting her go home.  We'll get her blood sugars under control this evening restart her diabetic medications and consult endocrinology.  I guess they wanted to have her admitted 2 days ago with the patient refused.  We'll see what recommendations they have.  We'll consult her gastroenterologist and get his opinion regarding her gastroparesis but until her diabetes is under better control I suspect that her gastroparesis will continue to be difficult to manage.  She is also on narcotic pain medications which will help matters much either.  I'll consult the [facility] to assess her anxiety and depression.  I guess in October we took her off her Wellbutrin due to some issues with her platelets.  She's not been restarted on any antidepressants and she's been unable to get an appointment to see a psychiatrist or a therapist.  Maybe we can help expedite her follow-up.  As far as the left shoulder fractures concerned she has follow-up arranged with orthopedic surgeon.  Imaging here showed redemonstration of the fracture.  I do not have any images to compare this to.  We'll consult an orthopedist to take a look at her shoulder and see if they have any further recommendations.    Jose Morales MD  [facility]  12/13/18  10:23 PM    Electronically signed by Jose Morales MD at 12/13/2018 10:44 PM          Emergency Department Notes      Audrey Garcia RN at 12/13/2018  6:26 PM        Pt states that she is here for several issues. States that her blood sugar is \"too high to read\" on her monitor. Reports that she was seen by her PCP 2 days ago and was told to come to the ER then. Also complains of liver disease, difficulty urinating, and states that she had a broken arm and fell into the wall last night on that arm.     Electronically signed by Audrey Garcia RN at 12/13/2018  6:29 PM     Anisha East APRN at 12/13/2018  6:30 PM     Attestation signed " by Daryn Candelario MD at 12/13/2018  9:17 PM    I supervised care provided by the midlevel provider.    We have discussed this patient's history, physical exam, and treatment plan.   I have reviewed the note and personally saw and examined the patient and agree with the plan of care.  See attached attending note.          For this patient encounter, I reviewed the NP or PA documentation, treatment plan, and medical decision making. Daryn Candelario MD 12/13/2018 9:17 PM                  EMERGENCY DEPARTMENT ENCOUNTER    Room Number:  25/25  Date seen:  12/13/2018  Time seen: 6:30 PM  PCP: Blanca Pitst MD   Endocrinologist: Dr. Burnett    HPI:  Chief complaint: Hyperglycemia  Context:Silvia Zabala is a 58 y.o. female with hx of DUKES and Type II DM who presents to the ED with c/o hyperglycemia that has been ongoing. Pt states that her glucose on home monitor was unable to be measured. Pt is on insulin and states that she is complaint with her insulin. Pt also reports having pain in the L shoulder at the site at her previous fx. She also reports having some nausea, abd distention, and LE edema. Pt has hx of depression and was previously on Wellbutrin, which she has stopped. Due to her continued health problems, she reports that her depression has worsened and she has had some fleeting SI with no definite plan.    Timing: constant  Duration: ongoing  Quality: hyperglycemia  Intensity/Severity: severe  Associated Symptoms: arthralgia, abd distention, nausea, edema  Aggravating Factors: none  Alleviating Factors:none  Previous Episodes:hx of poorly controlled DM  Treatment before arrival: seen at endocrinologist 2 days ago.     ALLERGIES  Albuterol; Tramadol; Lactulose; and Quinine derivatives        REVIEW OF SYSTEMS  Review of Systems   Constitutional: Negative for activity change, appetite change, diaphoresis and fever.   HENT: Negative for trouble swallowing.    Eyes: Negative for visual disturbance.    Respiratory: Negative for cough, chest tightness, shortness of breath and wheezing.    Cardiovascular: Positive for leg swelling. Negative for chest pain and palpitations.   Gastrointestinal: Positive for abdominal distention, abdominal pain and nausea. Negative for diarrhea and vomiting.   Endocrine:        Hyperglycemia   Genitourinary: Negative for dysuria.   Musculoskeletal: Positive for arthralgias (L shoulder). Negative for back pain.   Skin: Negative for rash.   Neurological: Negative for dizziness, speech difficulty and light-headedness.   Psychiatric/Behavioral: Positive for dysphoric mood and suicidal ideas (fleeting).       PHYSICAL EXAM  ED Triage Vitals [12/13/18 1825]   Temp Heart Rate Resp BP SpO2   98.4 °F (36.9 °C) 111 18 -- 98 %      Temp src Heart Rate Source Patient Position BP Location FiO2 (%)   Tympanic Monitor -- -- --     Physical Exam   Constitutional: She is oriented to person, place, and time and well-developed, well-nourished, and in no distress. No distress.   HENT:   Head: Normocephalic and atraumatic.   Mouth/Throat: Uvula is midline and mucous membranes are normal.   Neck: Normal range of motion. Neck supple.   Cardiovascular: S1 normal, S2 normal and normal heart sounds. Tachycardia present. Exam reveals no gallop and no friction rub.   No murmur heard.  Pulmonary/Chest: Effort normal. She has decreased breath sounds. She has no wheezes. She has no rhonchi. She has no rales.   Abdominal: Soft. Normal appearance. She exhibits distension (and rounded). There is no rebound and no guarding.   Musculoskeletal: Normal range of motion. She exhibits edema (2+ BLE).        Left shoulder: She exhibits tenderness.        Left upper arm: She exhibits tenderness.   Neurological: She is alert and oriented to person, place, and time.   Skin: Skin is warm, dry and intact.   Psychiatric: Affect and judgment normal. She exhibits a depressed mood.   Nursing note and vitals reviewed.        I ordered  the above noted radiological studies and reviewed the images on the PACS system.    MEDICATIONS GIVEN IN ER  Medications   sodium chloride 0.9 % flush 10 mL (not administered)   insulin regular (humuLIN R,novoLIN R) injection 10 Units (not administered)   sodium chloride 0.9 % bolus 1,000 mL (1,000 mL Intravenous New Bag 12/13/18 1905)   insulin regular (humuLIN R,novoLIN R) injection 10 Units (10 Units Intravenous Given 12/13/18 1909)       EKG  EKG          EKG time: 1901  Rhythm/Rate: Sinus tachycardia rate 102  P waves and TX: Nml TX  QRS, axis: Nml QRS   ST and T waves: No ST abnormalities     Interpreted Contemporaneously by me, independently viewed  Improved compared to prior 10/4/18      COURSE & MEDICAL DECISION MAKING  Pertinent Labs and Imaging studies that were ordered and reviewed are noted above.  Results were reviewed/discussed with the patient and they were also made aware of online access.  Pt also made aware that some labs, such as cultures, will not be resulted during ER visit and follow up with PMD is necessary.     PROGRESS AND CONSULTS    Progress Notes:  1835 Ordered CBC, UA, lipase, CMP, and POC glucose for further evaluation. Ordered insulin and IVF for hydration.    1845 Ordered EKG, hemoglobin A1c, ethanol, UDS, ammonia, CXR, XR L shoulder, and XR L humerus for further evaluation.    2040 Reviewed pt's history and workup with Dr. Candelario.  After a bedside evaluation, Dr. Candelario agrees with the plan of care.    2043 Placed call to [facility] for admission.    2104 Discussed pt with Dr. Holm, [facility], who agrees to admit.    2109 Rechecked with pt and discussed plan to admit. Pt understands and agrees with the plan, all questions answered.    ADMISSION    Based on the patient's lab findings and presenting symptoms, the doctor and I feel it is appropriate to admit the patient for further management, evaluation, and treatment.  I have discussed this with the admitting team.  I have also discussed  "this with the patient/family.  They are in agreement with admission.        Disposition vitals:  /73   Pulse 98   Temp 98.4 °F (36.9 °C) (Tympanic)   Resp 18   Ht 168.9 cm (66.5\")   Wt 80 kg (176 lb 4.8 oz)   SpO2 99%   BMI 28.03 kg/m²        DIAGNOSIS  Final diagnoses:   Type 2 diabetes mellitus with hyperglycemia, with long-term current use of insulin (CMS/[facility])   Anasarca   DUKES (nonalcoholic steatohepatitis)   Depression, unspecified depression type   Closed 2-part displaced fracture of surgical neck of left humerus, initial encounter, non acute     Documentation assistance provided by tori Hyatt and Teena Fajardo for JOEL Gonzalez.  Information recorded by the scribe was done at my direction and has been verified and validated by me.  Electronically signed by Klever Hyatt and Teena Fajardo on 12/13/2018 at time 6:30 PM.           Rich Hyatt  12/13/18 1936        Teena Fajardo  12/13/18 2112       Anisha East APRN  12/13/18 2115      Electronically signed by Daryn Candelario MD at 12/13/2018  9:17 PM     Izabel Bowen RN at 12/13/2018  6:48 PM        Anisha East NP made aware of Pts suicidal thoughts and is at bedside. Pt states, \" I don't think that I would ever do it. I just have so much going on and am sick and tired or being sick and tired.\" Anisha East NP does not want Pt on a hold or to have a sitter. Pt made aware that if she starts having any thoughts while hear, to notify staff immediately. Pt verbalizes understanding. Pts  is also at bedside.     Izabel Bowen, RN  12/13/18 1930      Electronically signed by Izabel Bowen RN at 12/13/2018  7:30 PM     Daryn Candelario MD at 12/13/2018  8:36 PM        MD ATTESTATION NOTE  The AYESHA and I have discussed this patient's history, physical exam, and treatment plan.  I have reviewed the documentation and personally had a face to face interaction with the patient. I affirm the documentation and agree " with the treatment and plan.  The attached note describes my personal findings.    Hx- Pt presents c/o hyperglycemia for the past several months. She says she is on insulin and is compliant with her insulin Pt also c/o LE swelling recently, L shoulder pain (s/p a Fx), nausea, abd distension, and SOA.     PEx-  Constitution- NAD  Heart- RRR, no murmur,  Lungs- CTAB, no respiratory distress  Abd- soft, non-tender, no rebound, no guarding  Neuro- A/Ox3  Musc- 2+ pedal edema    Plan- To admit pt for further evaluation and treatment.     I have reviewed the pt's lab and imaging results and discussed these results with the pt and with JOEL Gonzalez. See pertinent results below:    Glucose- 608 (no evidence of DKA)    CXR- negative acute, but shows previous L humeral fracture    Documentation assistance provided by tori Dash for Dr. Candelario.  Information recorded by the scribe was done at my direction and has been verified and validated by me.       Gerson Dash  12/13/18 2046       Daryn Candelario MD  12/13/18 2124      Electronically signed by Daryn Candelario MD at 12/13/2018  9:24 PM         Lines, Drains & Airways    Active LDAs     Name:   Placement date:   Placement time:   Site:   Days:    Peripheral IV 12/13/18 1858 Right Wrist   12/13/18 1858    Wrist   3         Inactive LDAs     None                Lab Results (all)     Procedure Component Value Units Date/Time    CBC & Differential [[id number]] Collected:  12/17/18 0548    Specimen:  Blood Updated:  12/17/18 0650    Narrative:       CBC Auto Differential [[id number]]  (Abnormal) Collected:  12/17/18 0548    Specimen:  Blood Updated:  12/17/18 0650     WBC 2.79 10*3/mm3      RBC 3.92 10*6/mm3      Hemoglobin 10.3 g/dL      Hematocrit 33.2 %      MCV 84.7 fL      MCH 26.3 pg      MCHC 31.0 g/dL      RDW 16.9 %      RDW-SD 52.1 fl      MPV 10.4 fL      Platelets 109 10*3/mm3      Neutrophil % 58.5 %      Lymphocyte % 22.9 %      Monocyte %  14.3 %      Eosinophil % 3.9 %      Basophil % 0.4 %      Immature Grans % 0.4 %      Neutrophils, Absolute 1.63 10*3/mm3      Lymphocytes, Absolute 0.64 10*3/mm3      Monocytes, Absolute 0.40 10*3/mm3      Eosinophils, Absolute 0.11 10*3/mm3      Basophils, Absolute 0.01 10*3/mm3      Immature Grans, Absolute 0.01 10*3/mm3      nRBC 0.0 /100 WBC     POC Glucose Once [[id number]]  (Abnormal) Collected:  12/17/18 0559    Specimen:  Blood Updated:  12/17/18 0621     Glucose 199 mg/dL     POC Glucose Once [[id number]]  (Abnormal) Collected:  12/16/18 2108    Specimen:  Blood Updated:  12/16/18 2123     Glucose 148 mg/dL     POC Glucose Once [[id number]]  (Abnormal) Collected:  12/16/18 1641    Specimen:  Blood Updated:  12/16/18 1643     Glucose 239 mg/dL     POC Glucose Once [[id number]]  (Abnormal) Collected:  12/16/18 1122    Specimen:  Blood Updated:  12/16/18 1123     Glucose 173 mg/dL     Comprehensive Metabolic Panel [[id number]]  (Abnormal) Collected:  12/16/18 0433    Specimen:  Blood Updated:  12/16/18 1024     Glucose 216 mg/dL      BUN 11 mg/dL      Creatinine 0.81 mg/dL      Sodium 138 mmol/L      Potassium 4.0 mmol/L      Chloride 99 mmol/L      CO2 26.4 mmol/L      Calcium 8.9 mg/dL      Total Protein 6.4 g/dL      Albumin 3.30 g/dL      ALT (SGPT) 22 U/L      AST (SGOT) 26 U/L      Alkaline Phosphatase 202 U/L      Total Bilirubin 0.8 mg/dL      eGFR Non African Amer 73 mL/min/1.73      Globulin 3.1 gm/dL      A/G Ratio 1.1 g/dL      BUN/Creatinine Ratio 13.6     Anion Gap 12.6 mmol/L     POC Glucose Once [[id number]]  (Abnormal) Collected:  12/16/18 0604    Specimen:  Blood Updated:  12/16/18 0606     Glucose 233 mg/dL     Vitamin D 25 Hydroxy [[id number]]  (Abnormal) Collected:  12/16/18 0433    Specimen:  Blood Updated:  12/16/18 0559     25 Hydroxy, Vitamin D 27.8 ng/ml     Narrative:       Lipid Panel [[id number]]  (Abnormal) Collected:  12/16/18 0433    Specimen:  Blood Updated:  12/16/18 0538     Total  Cholesterol 173 mg/dL      Triglycerides 65 mg/dL      HDL Cholesterol 53 mg/dL      LDL Cholesterol  107 mg/dL      VLDL Cholesterol 13 mg/dL      LDL/HDL Ratio 2.02    Narrative:       Insulin Antibody [[id number]] Collected:  12/16/18 0433    Specimen:  Blood Updated:  12/16/18 0504    IA-2 Autoantibodies [[id number]] Collected:  12/16/18 0433    Specimen:  Blood Updated:  12/16/18 0504    Anti-islet Cell Antibody [[id number]] Collected:  12/16/18 0433    Specimen:  Blood Updated:  12/16/18 0504    C-Peptide [[id number]] Collected:  12/16/18 0433    Specimen:  Blood Updated:  12/16/18 0504    Glutamic Acid Decarboxylase [[id number]] Collected:  12/16/18 0433    Specimen:  Blood Updated:  12/16/18 0504    POC Glucose Once [[id number]]  (Abnormal) Collected:  12/15/18 2120    Specimen:  Blood Updated:  12/15/18 2121     Glucose 296 mg/dL     POC Glucose Once [[id number]]  (Abnormal) Collected:  12/15/18 1609    Specimen:  Blood Updated:  12/15/18 1610     Glucose 247 mg/dL     Microalbumin / Creatinine Urine Ratio - Urine, Clean Catch [[id number]] Collected:  12/15/18 1057    Specimen:  Urine, Clean Catch Updated:  12/15/18 1200     Microalbumin/Creatinine Ratio -- mg/g      Comment: Unable to calculate        Creatinine, Urine 25.0 mg/dL      Microalbumin, Urine <1.2 mg/L     TSH [[id number]]  (Normal) Collected:  12/14/18 0448    Specimen:  Blood Updated:  12/15/18 1119     TSH 1.550 mIU/mL     T4, Free [[id number]]  (Abnormal) Collected:  12/14/18 0448    Specimen:  Blood Updated:  12/15/18 1119     Free T4 0.85 ng/dL     POC Glucose Once [[id number]]  (Abnormal) Collected:  12/15/18 1109    Specimen:  Blood Updated:  12/15/18 1110     Glucose 335 mg/dL     POC Glucose Once [[id number]]  (Abnormal) Collected:  12/15/18 0558    Specimen:  Blood Updated:  12/15/18 0559     Glucose 262 mg/dL     POC Glucose Once [[id number]]  (Abnormal) Collected:  12/14/18 2004    Specimen:  Blood Updated:  12/14/18 2005     Glucose 200 mg/dL      POC Glucose Once [[id number]]  (Normal) Collected:  12/14/18 1605    Specimen:  Blood Updated:  12/14/18 1607     Glucose 102 mg/dL     POC Glucose Once [[id number]]  (Abnormal) Collected:  12/14/18 1100    Specimen:  Blood Updated:  12/14/18 1102     Glucose 213 mg/dL     POC Glucose Once [[id number]]  (Abnormal) Collected:  12/14/18 0606    Specimen:  Blood Updated:  12/14/18 0609     Glucose 217 mg/dL     Comprehensive Metabolic Panel [[id number]]  (Abnormal) Collected:  12/14/18 0448    Specimen:  Blood Updated:  12/14/18 0603     Glucose 225 mg/dL      BUN 9 mg/dL      Creatinine 0.61 mg/dL      Sodium 135 mmol/L      Potassium 3.8 mmol/L      Chloride 99 mmol/L      CO2 22.5 mmol/L      Calcium 9.0 mg/dL      Total Protein 6.2 g/dL      Albumin 3.10 g/dL      ALT (SGPT) 25 U/L      AST (SGOT) 34 U/L      Alkaline Phosphatase 215 U/L      Total Bilirubin 0.7 mg/dL      eGFR Non African Amer 101 mL/min/1.73      Globulin 3.1 gm/dL      A/G Ratio 1.0 g/dL      BUN/Creatinine Ratio 14.8     Anion Gap 13.5 mmol/L     CBC & Differential [[id number]] Collected:  12/14/18 0448    Specimen:  Blood Updated:  12/14/18 0554    Narrative:       CBC Auto Differential [[id number]]  (Abnormal) Collected:  12/14/18 0448    Specimen:  Blood Updated:  12/14/18 0554     WBC 3.15 10*3/mm3      RBC 3.64 10*6/mm3      Hemoglobin 9.6 g/dL      Hematocrit 30.4 %      MCV 83.5 fL      MCH 26.4 pg      MCHC 31.6 g/dL      RDW 17.0 %      RDW-SD 52.3 fl      MPV 10.2 fL      Platelets 100 10*3/mm3      Neutrophil % 61.5 %      Lymphocyte % 21.0 %      Monocyte % 13.7 %      Eosinophil % 3.2 %      Basophil % 0.6 %      Immature Grans % 1.0 %      Neutrophils, Absolute 1.94 10*3/mm3      Lymphocytes, Absolute 0.66 10*3/mm3      Monocytes, Absolute 0.43 10*3/mm3      Eosinophils, Absolute 0.10 10*3/mm3      Basophils, Absolute 0.02 10*3/mm3      Immature Grans, Absolute 0.03 10*3/mm3     POC Glucose Once [[id number]]  (Abnormal) Collected:   12/14/18 0147    Specimen:  Blood Updated:  12/14/18 0158     Glucose 381 mg/dL     POC Glucose Once [[id number]]  (Abnormal) Collected:  12/13/18 2159    Specimen:  Blood Updated:  12/13/18 2200     Glucose 453 mg/dL     POC Glucose Once [[id number]]  (Abnormal) Collected:  12/13/18 2036    Specimen:  Blood Updated:  12/13/18 2037     Glucose 499 mg/dL     Comprehensive Metabolic Panel [[id number]]  (Abnormal) Collected:  12/13/18 1900    Specimen:  Blood Updated:  12/13/18 1959     Glucose 608 mg/dL      BUN 11 mg/dL      Creatinine 0.87 mg/dL      Sodium 131 mmol/L      Potassium 4.8 mmol/L      Chloride 95 mmol/L      CO2 21.9 mmol/L      Calcium 8.9 mg/dL      Total Protein 6.5 g/dL      Albumin 3.30 g/dL      ALT (SGPT) 31 U/L      AST (SGOT) 39 U/L      Alkaline Phosphatase 247 U/L      Total Bilirubin 0.8 mg/dL      eGFR Non African Amer 67 mL/min/1.73      Globulin 3.2 gm/dL      A/G Ratio 1.0 g/dL      BUN/Creatinine Ratio 12.6     Anion Gap 14.1 mmol/L     Urine Drug Screen - Urine, Catheter [[id number]]  (Abnormal) Collected:  12/13/18 1915    Specimen:  Urine, Catheter Updated:  12/13/18 1958     Amphet/Methamphet, Screen Negative     Barbiturates Screen, Urine Negative     Benzodiazepine Screen, Urine Negative     Cocaine Screen, Urine Negative     Opiate Screen Negative     THC, Screen, Urine Negative     Methadone Screen, Urine Negative     Oxycodone Screen, Urine Positive    Narrative:       Hemoglobin A1c [[id number]]  (Abnormal) Collected:  12/13/18 1900    Specimen:  Blood Updated:  12/13/18 1948     Hemoglobin A1C 9.20 %     Narrative:       Lipase [[id number]]  (Abnormal) Collected:  12/13/18 1900    Specimen:  Blood Updated:  12/13/18 1946     Lipase 8 U/L     Ethanol [[id number]] Collected:  12/13/18 1900    Specimen:  Blood Updated:  12/13/18 1946     Ethanol <10 mg/dL      Ethanol % <0.010 %     Ammonia [[id number]]  (Abnormal) Collected:  12/13/18 1903    Specimen:  Blood Updated:  12/13/18 1935      Ammonia 87 umol/L     Urinalysis With Microscopic If Indicated (No Culture) - Urine, Catheter [[id number]]  (Abnormal) Collected:  12/13/18 1915    Specimen:  Urine, Catheter Updated:  12/13/18 1935     Color, UA Yellow     Appearance, UA Clear     pH, UA 6.0     Specific Gravity, UA 1.023     Glucose, UA >=1000 mg/dL (3+)     Ketones, UA Negative     Bilirubin, UA Negative     Blood, UA Negative     Protein, UA Negative     Leuk Esterase, UA Negative     Nitrite, UA Negative     Urobilinogen, UA 0.2 E.U./dL    Narrative:       Urine microscopic not indicated.    CBC & Differential [[id number]] Collected:  12/13/18 1900    Specimen:  Blood Updated:  12/13/18 1929    Narrative:       CBC Auto Differential [[id number]]  (Abnormal) Collected:  12/13/18 1900    Specimen:  Blood Updated:  12/13/18 1929     WBC 2.88 10*3/mm3      RBC 3.81 10*6/mm3      Hemoglobin 10.1 g/dL      Hematocrit 34.0 %      MCV 89.2 fL      MCH 26.5 pg      MCHC 29.7 g/dL      RDW 17.0 %      RDW-SD 55.7 fl      MPV 10.3 fL      Platelets 99 10*3/mm3      Neutrophil % 72.0 %      Lymphocyte % 12.8 %      Monocyte % 11.8 %      Eosinophil % 2.4 %      Basophil % 0.3 %      Immature Grans % 0.7 %      Neutrophils, Absolute 2.07 10*3/mm3      Lymphocytes, Absolute 0.37 10*3/mm3      Monocytes, Absolute 0.34 10*3/mm3      Eosinophils, Absolute 0.07 10*3/mm3      Basophils, Absolute 0.01 10*3/mm3      Immature Grans, Absolute 0.02 10*3/mm3     POC Glucose Once [[id number]]  (Abnormal) Collected:  12/13/18 1832    Specimen:  Blood Updated:  12/13/18 1834     Glucose 584 mg/dL           Imaging Results (all)     Procedure Component Value Units Date/Time    FL Esophagram Complete [[id number]] Collected:  12/16/18 1607     Updated:  12/16/18 1654    Narrative:       ESOPHAGRAM     HISTORY: Episodes of dysphagia and choking.     An initial AP view of the chest shows clear lungs and the heart is top  normal in size. The patient was evaluated in the upright  position and  swallowed multiple sips of barium without difficulty. There is normal  distention of the hypopharynx and cervical esophagus. The remainder the  esophagus also appears normal with prompt emptying into the stomach and  no evidence of stricture.     The patient had some slight difficulty swallowing a barium tablet in the  oropharynx but eventually swallowed with multiple sips of water. The  barium tablet passed quickly through the length of the esophagus and  into the stomach.     CONCLUSION: Negative esophagram. No evidence of extrinsic compression or  stricture. 15 imaging sequences were obtained and the fluoroscopy time  measures 1 minute and 9 seconds.     This report was finalized on 12/16/2018 4:51 PM by Dr. Garett Hess M.D.       XR Chest 2 View [[id number]] Collected:  12/13/18 1958     Updated:  12/13/18 2006    Narrative:       XR CHEST 2 VW-, XR HUMERUS LEFT-, XR SHOULDER 2+ VW LEFT-     Clinical: Fell 4 weeks ago, recent fall, multifocal pain     COMPARISON chest radiograph 10/2/2018     Chest findings: There is a left humerus fracture. The left ribs overall  are satisfactory in appearance.     Cardiac size upper normal. Mediastinum and anaid are satisfactory in  appearance. No pneumothorax, pleural effusion, vascular congestion nor  active airspace disease demonstrated.     CONCLUSION: No active cardiovascular or pulmonary process is  demonstrated. Left humeral fracture.     Left shoulder left humerus findings: There is a fracture demonstrated  through the surgical neck of the left humeral head. There is rotation of  the humeral head however no dislocation. Superior displacement of the  shaft. At the site of fracture the bone demonstrates a mottled  appearance, potential pathologic fracture. The acromioclavicular  alignment is satisfactory. No fracture of the scapula or adjacent ribs.  Overlying soft tissues are satisfactory in appearance. Mid and distal  humerus are normal in appearance.      CONCLUSION: Fracture through the surgical neck of the left humeral head,  potential pathologic fracture.     This report was finalized on 12/13/2018 8:03 PM by Dr. Francisco Dela Cruz M.D.       XR Shoulder 2+ View Left [[id number]] Collected:  12/13/18 1958     Updated:  12/13/18 2006    Narrative:       XR CHEST 2 VW-, XR HUMERUS LEFT-, XR SHOULDER 2+ VW LEFT-     Clinical: Fell 4 weeks ago, recent fall, multifocal pain     COMPARISON chest radiograph 10/2/2018     Chest findings: There is a left humerus fracture. The left ribs overall  are satisfactory in appearance.     Cardiac size upper normal. Mediastinum and anaid are satisfactory in  appearance. No pneumothorax, pleural effusion, vascular congestion nor  active airspace disease demonstrated.     CONCLUSION: No active cardiovascular or pulmonary process is  demonstrated. Left humeral fracture.     Left shoulder left humerus findings: There is a fracture demonstrated  through the surgical neck of the left humeral head. There is rotation of  the humeral head however no dislocation. Superior displacement of the  shaft. At the site of fracture the bone demonstrates a mottled  appearance, potential pathologic fracture. The acromioclavicular  alignment is satisfactory. No fracture of the scapula or adjacent ribs.  Overlying soft tissues are satisfactory in appearance. Mid and distal  humerus are normal in appearance.     CONCLUSION: Fracture through the surgical neck of the left humeral head,  potential pathologic fracture.     This report was finalized on 12/13/2018 8:03 PM by Dr. Francisco Dela Cruz M.D.       XR Humerus Left [[id number]] Collected:  12/13/18 1958     Updated:  12/13/18 2006    Narrative:       XR CHEST 2 VW-, XR HUMERUS LEFT-, XR SHOULDER 2+ VW LEFT-     Clinical: Fell 4 weeks ago, recent fall, multifocal pain     COMPARISON chest radiograph 10/2/2018     Chest findings: There is a left humerus fracture. The left ribs overall  are satisfactory in  appearance.     Cardiac size upper normal. Mediastinum and anaid are satisfactory in  appearance. No pneumothorax, pleural effusion, vascular congestion nor  active airspace disease demonstrated.     CONCLUSION: No active cardiovascular or pulmonary process is  demonstrated. Left humeral fracture.     Left shoulder left humerus findings: There is a fracture demonstrated  through the surgical neck of the left humeral head. There is rotation of  the humeral head however no dislocation. Superior displacement of the  shaft. At the site of fracture the bone demonstrates a mottled  appearance, potential pathologic fracture. The acromioclavicular  alignment is satisfactory. No fracture of the scapula or adjacent ribs.  Overlying soft tissues are satisfactory in appearance. Mid and distal  humerus are normal in appearance.     CONCLUSION: Fracture through the surgical neck of the left humeral head,  potential pathologic fracture.     This report was finalized on 12/13/2018 8:03 PM by Dr. Francisco Dela Cruz M.D.             ECG/EMG Results (all)     Procedure Component Value Units Date/Time    ECG 12 Lead [[id number]] Collected:  12/13/18 1901     Updated:  12/14/18 1208    Narrative:       RR Interval= 588 ms  IL Interval= 155 ms  QRSD Interval= 92 ms  QT Interval= 362 ms  QTc Interval= 472 ms  Heart Rate= 102 ms  P Axis= 90 deg  QRS Axis= 89 deg  T Wave Axis= 69 deg  I: 40 Axis= 55 deg  T: 40 Axis= 105 deg  ST Axis= 70 deg  Sinus tachycardia  Ventricular premature complex  Aberrant complex  Low voltage, precordial leads  No Change from Prior Tracing  Electronically Signed by:  Kade Pride ([facility]) 14-Dec-2018 12:07:22  Date and Time of Study: 2018-12-13 19:01:40             Physician Progress Notes (all)      Los Knutson MD at 12/16/2018  2:24 PM          [facility]  Inpatient Progress Note    Reason for Follow Up:  I was called to see her today because of choking on food    Subjective      Interval History:   She had an issue this morning with choking on eggs, she tells me she occasionally does get dysphagia.  EGD October 2018 shows no evidence of ring or stricture    Current Facility-Administered Medications:   •  ALPRAZolam (XANAX) tablet 0.5 mg, 0.5 mg, Oral, BID PRN, Jose Morales MD, 0.5 mg at 12/15/18 2047  •  amitriptyline (ELAVIL) tablet 50 mg, 50 mg, Oral, Nightly, Karlene Del Cid APRN, 50 mg at 12/15/18 2046  •  cholecalciferol (VITAMIN D3) tablet 1,000 Units, 1,000 Units, Oral, Daily, Dionisio Altman MD, 1,000 Units at 12/16/18 1140  •  cyclobenzaprine (FLEXERIL) tablet 5 mg, 5 mg, Oral, Nightly, Karlene Del Cid APRN, 5 mg at 12/15/18 2046  •  cyclobenzaprine (FLEXERIL) tablet 5 mg, 5 mg, Oral, TID PRN, Jose Almanza MD  •  dextrose (D50W) 25 g/ 50mL Intravenous Solution 25 g, 25 g, Intravenous, Q15 Min PRN, Karlene Del Cid APRN  •  dextrose (GLUTOSE) oral gel 15 g, 15 g, Oral, Q15 Min PRN, Karlene Del Cid APRN  •  DULoxetine (CYMBALTA) DR capsule 90 mg, 90 mg, Oral, Daily, Karlene Del Cid APRN, 90 mg at 12/16/18 0823  •  folic acid (FOLVITE) tablet 1 mg, 1 mg, Oral, Daily, Karlene Del Cid APRN, 1 mg at 12/16/18 0823  •  [START ON 12/18/2018] furosemide (LASIX) tablet 40 mg, 40 mg, Oral, Daily, Jose Almanza MD  •  glucagon (human recombinant) (GLUCAGEN DIAGNOSTIC) injection 1 mg, 1 mg, Subcutaneous, PRN, Karlene Del Cid APRN  •  hydrOXYzine (ATARAX) tablet 50 mg, 50 mg, Oral, TID PRN, Karlene Del Cid APRN  •  insulin glargine (LANTUS) injection 32 Units, 32 Units, Subcutaneous, Q12H, Dionisio Altman MD  •  insulin lispro (humaLOG) injection 0-24 Units, 0-24 Units, Subcutaneous, 4x Daily With Meals & Nightly, Karlene Del Cid APRN, 4 Units at 12/16/18 1141  •  insulin lispro (humaLOG) injection 10 Units, 10 Units, Subcutaneous, TID With Meals, Merary Burnett MD, 10 Units at 12/16/18 0823  •  levETIRAcetam (KEPPRA) tablet 500 mg, 500 mg, Oral, Q12H, Karlene Del Cid  MARCO ANTONIO, APRN, 500 mg at 12/16/18 0823  •  multivitamin with minerals 1 tablet, 1 tablet, Oral, Daily, Karlene Del Cid, APRN, 1 tablet at 12/16/18 0823  •  ondansetron (ZOFRAN) tablet 4 mg, 4 mg, Oral, Q6H PRN, 4 mg at 12/16/18 0019 **OR** ondansetron ODT (ZOFRAN-ODT) disintegrating tablet 4 mg, 4 mg, Oral, Q6H PRN **OR** ondansetron (ZOFRAN) injection 4 mg, 4 mg, Intravenous, Q6H PRN, Karlene Del Cid, APRN, 4 mg at 12/15/18 1553  •  oxyCODONE (ROXICODONE) immediate release tablet 7.5 mg, 7.5 mg, Oral, Q8H PRN, Jose Morales MD, 7.5 mg at 12/16/18 0822  •  pantoprazole (PROTONIX) EC tablet 40 mg, 40 mg, Oral, QAM, Karlene Del Cid, APRN, 40 mg at 12/16/18 0621  •  pregabalin (LYRICA) capsule 75 mg, 75 mg, Oral, Q12H, Jose Morales MD, 75 mg at 12/16/18 0822  •  promethazine (PHENERGAN) tablet 25 mg, 25 mg, Oral, Q6H PRN, Karlene Del Cid, APRN, 25 mg at 12/16/18 1202  •  rifaximin (XIFAXAN) tablet 550 mg, 550 mg, Oral, BID, Karlene Del Cid, APRN, 550 mg at 12/16/18 0823  •  sodium chloride 0.9 % flush 1-10 mL, 1-10 mL, Intravenous, PRN, Karlene Del Cid, APRN  •  [COMPLETED] Insert peripheral IV, , , Once **AND** sodium chloride 0.9 % flush 10 mL, 10 mL, Intravenous, PRN, Anisha East, APRN, 10 mL at 12/14/18 0129  •  sodium chloride 0.9 % flush 3 mL, 3 mL, Intravenous, Q12H, Karlene Del Cid APRN, 3 mL at 12/15/18 2100  •  [START ON 12/18/2018] spironolactone (ALDACTONE) tablet 100 mg, 100 mg, Oral, Daily, Jose Almanza MD  •  sucralfate (CARAFATE) tablet 1 g, 1 g, Oral, BID, Karlene Del Cid APRN, 1 g at 12/16/18 0823  •  trimethobenzamide (TIGAN) capsule 300 mg, 300 mg, Oral, TID, Karlene Del Cid APRN, 300 mg at 12/16/18 0822  •  vitamin B-12 (CYANOCOBALAMIN) tablet 1,000 mcg, 1,000 mcg, Oral, Daily, Karlene Del Cid APRN, 1,000 mcg at 12/16/18 0823  Review of Systems:    Has weakness and fatigue all other systems reviewed and negative    Objective     Vital Signs  Temp:  [98 °F (36.7 °C)-98.4 °F (36.9 °C)]  98.1 °F (36.7 °C)  Heart Rate:  [] 105  Resp:  [16] 16  BP: (112-126)/(64-69) 114/65  Body mass index is 29.94 kg/m².    Intake/Output Summary (Last 24 hours) at 12/16/2018 1424  Last data filed at 12/16/2018 1340  Gross per 24 hour   Intake 917 ml   Output 2500 ml   Net -1583 ml     I/O this shift:  In: 440 [P.O.:440]  Out: 600 [Urine:600]     Physical Exam:   General: patient awake, alert and cooperative   Eyes: Normal lids and lashes, no scleral icterus   Neck: supple, normal ROM   Skin: warm and dry, not jaundiced   Cardiovascular: regular rhythm and rate, no murmurs auscultated   Pulm: clear to auscultation bilaterally, regular and unlabored   Abdomen: soft, nontender, nondistended; normal bowel sounds   Rectal: deferred   Extremities: no rash or edema   Psychiatric: Normal mood and behavior; memory intact     Assessment/Plan     Patient Active Problem List   Diagnosis   • Cirrhosis of liver (CMS/HCC)   • Rheumatoid arthritis (CMS/HCC)   • Anxiety and depression   • Type 2 diabetes mellitus, uncontrolled, with neuropathy (CMS/HCC)   • Fibrositis   • Change in blood platelet count   • Pancytopenia (CMS/HCC)   • Hypersplenism   • Nausea   • DUKES (nonalcoholic steatohepatitis)   • Hyperlipidemia   • Abdominal pain   • Hematemesis   • Ascites   • Portal hypertension (CMS/HCC)   • Systemic lupus (CMS/HCC)   • Secondary esophageal varices without bleeding (CMS/HCC)   • Gastroparesis   • Cirrhosis of liver with ascites (CMS/HCC)   • Diabetic ketoacidosis without coma associated with type 2 diabetes mellitus (CMS/HCC)   • Diabetic peripheral neuropathy (CMS/HCC)   • History of seizure disorder   • Hepatic encephalopathy (CMS/HCC)   • Abnormal finding of blood chemistry    • Thrombocytopenia (CMS/HCC)   • Fever   • Acute ITP (CMS/HCC)   • Degeneration of lumbosacral intervertebral disc   • Seizure (CMS/HCC)   • Spondylosis without myelopathy   • Intractable vomiting with nausea   • UGI bleed   • Migraine without  aura   • Urinary retention   • Type 2 diabetes mellitus with hyperglycemia, with long-term current use of insulin (CMS/HCC)   • BRICE (obstructive sleep apnea)   • Gastroparesis   • Hyperammonemia (CMS/HCC)   • Hyponatremia   • Anemia   • Vitamin D insufficiency         Choked on food last night and a bit this morning  This happens intermittently  EGD reviewed from October with grade 1 varices otherwise negative      Assessment/Plan    No Indication for repeat EGD  I'll obtain barium esophagram  I would imagine this is either a transfer dysphagia or if it truly is transport dysphagia it is likely from esophageal spasms, continue Elavil  and PPI    I discussed the patients findings and my recommendations with patient, nursing staff and primary care team.    Los Knutson MD                Electronically signed by Los Knutson MD at 12/16/2018  2:27 PM     Jose Almanza MD at 12/16/2018 11:19 AM              [facility]     LOS: 0 days     Subjective:    CC:Hyperglycemia    DIET:  Diet Order   Procedures   • Diet Clear Liquid     She has fallen twice in last couple days prior to admission.     Choked on food last night, food is getting stuck in the esophgus    Clears this am, last ate     She was admitted in [place] few weeks ago    Objective:    Vital Signs:  Temp:  [98 °F (36.7 °C)-98.4 °F (36.9 °C)] 98.4 °F (36.9 °C)  Heart Rate:  [] 97  Resp:  [16] 16  BP: (112-126)/(64-69) 113/64    SpO2:  [92 %-96 %] 92 %  on   ;   Device (Oxygen Therapy): room air  Body mass index is 29.94 kg/m².    Physical Exam   Constitutional: She appears well-developed and well-nourished.   HENT:   Head: Normocephalic and atraumatic.   Cardiovascular: Normal rate and regular rhythm.   No murmur heard.  Pulmonary/Chest: Effort normal and breath sounds normal.   Abdominal: Soft. Bowel sounds are normal. She exhibits no distension. There is no tenderness.   Musculoskeletal:   Left arm in a sling    Neurological: She is alert.   Skin: Skin is warm and dry.            I have reviewed daily medications and changes in CPOE    Scheduled meds    amitriptyline 50 mg Oral Nightly   cholecalciferol 1,000 Units Oral Daily   cyclobenzaprine 5 mg Oral Nightly   DULoxetine 90 mg Oral Daily   folic acid 1 mg Oral Daily   furosemide 40 mg Oral Daily   insulin glargine 32 Units Subcutaneous Q12H   insulin lispro 0-24 Units Subcutaneous 4x Daily With Meals & Nightly   insulin lispro 10 Units Subcutaneous TID With Meals   levETIRAcetam 500 mg Oral Q12H   multivitamin with minerals 1 tablet Oral Daily   pantoprazole 40 mg Oral QAM   pregabalin 75 mg Oral Q12H   rifaximin 550 mg Oral BID   sodium chloride 3 mL Intravenous Q12H   spironolactone 100 mg Oral Daily   sucralfate 1 g Oral BID   trimethobenzamide 300 mg Oral TID   vitamin B-12 1,000 mcg Oral Daily          PRN meds  •  ALPRAZolam  •  cyclobenzaprine  •  dextrose  •  dextrose  •  glucagon (human recombinant)  •  hydrOXYzine  •  ondansetron **OR** ondansetron ODT **OR** ondansetron  •  oxyCODONE  •  promethazine  •  sodium chloride  •  [COMPLETED] Insert peripheral IV **AND** sodium chloride        Type 2 diabetes mellitus with hyperglycemia, with long-term current use of insulin (CMS/HCC)    Thrombocytopenia (CMS/HCC)    Cirrhosis of liver (CMS/HCC)    Rheumatoid arthritis (CMS/HCC)    Anxiety and depression    DUKES (nonalcoholic steatohepatitis)    Hyperlipidemia    BRICE (obstructive sleep apnea)    Gastroparesis    Hyperammonemia (CMS/HCC)    Hyponatremia    Anemia    Vitamin D insufficiency        Assessment/Plan:  Choking spells- esophagram today, consider EGD, d/w Dr dean    Type 2 diabetes mellitus with hyperglycemia, with long-term current use of insulin (CMS/HCC)  -Blood sugars are much better controlled compared to admission.  But still running 200s to 300s.  -Endocrinology is following and they have increased her Lantus to 55 and are continuing with Humalog  "10 with meals plus sliding scale insulin.  -at home se is on  65 with tid, 30 with snacks      Thrombocytopenia (CMS/HCC)  -platelets are stable      Cirrhosis of liver (CMS/HCC)  -LFT stable      Rheumatoid arthritis (CMS/HCC)  -she was on embrel and last dose was in May  -She sees Dr Munoz      Anxiety and depression      DUKES (nonalcoholic steatohepatitis) /  Hyperammonemia (CMS/HCC)  -lasix and spironolactone  -xifaxan      Hyperlipidemia      BRICE (obstructive sleep apnea)      Gastroparesis  -tigan for nausea      Hyponatremia    Chronic pain medication  -lyrica, flexiri, cymbalta, elavil      Anemia  -Hemoglobin level is slightly lower than baseline about 10    Seizure-keppra, no seizure in a long time, probably 15 years    Plan:  Choking spell yesterday    Jose Almanza MD  12/16/18  11:19 AM              Electronically signed by Jose Almanza MD at 12/16/2018 11:38 AM     Dionisio Altman MD at 12/16/2018 10:07 AM            ENDOCRINE    Subjective   AND PLANS  Silvia Zabala is a 58 y.o. female.     Follow-up diabetes and related disorders.    No nausea or vomiting.  Tolerating liquids.  Fasting glucose 233.  Random glucose 247-296.  Will change from Lantus 55 units at bedtime to Lantus 30 units twice a day.  Continue Humalog 10 units with each meal plus sliding scale.    Vitamin D insufficient.  Start vitamin D3 1000 units per day.    .  HDL 53.  Normal thyroid function tests.  Continue on diet alone for now    Objective   /64 (BP Location: Left arm, Patient Position: Lying)   Pulse 97   Temp 98.4 °F (36.9 °C) (Oral)   Resp 16   Ht 168.9 cm (66.5\")   Wt 85.4 kg (188 lb 4.4 oz)   SpO2 92%   BMI 29.94 kg/m²    Physical Exam    Awake, alert, not in distress.  Pink conjunctiva.  Sclerae not icteric.  No rales or wheezes.  Regular heart rate and rhythm.  Abdomen soft, globular, nontender.  Left upper extremity on sling and swath  No cyanosis or clubbing.  Trace leg " edema.  No calf tenderness      Type 2 diabetes mellitus with hyperglycemia, with long-term current use of insulin (CMS/HCC)    Cirrhosis of liver (CMS/HCC)    Rheumatoid arthritis (CMS/HCC)    Anxiety and depression    DUKES (nonalcoholic steatohepatitis)    Hyperlipidemia    Thrombocytopenia (CMS/HCC)    BRICE (obstructive sleep apnea)    Gastroparesis    Hyperammonemia (CMS/HCC)    Hyponatremia    Anemia    Vitamin D insufficiency    Insulin therapy as discussed above.  Start vitamin D3.  Continue low-fat diet.          Electronically signed by Dionisio Altman MD at 12/16/2018 10:17 AM     Jose Almanza MD at 12/15/2018  4:41 PM              [facility]     LOS: 0 days     Subjective:    CC:Hyperglycemia    DIET:  Diet Order   Procedures   • Diet Regular; Cardiac, Consistent Carbohydrate     She has fallen twice in last couple days prior to admission.     The patient vomited yesterday.  The patient states she doesn't vomit quite frequently at home and not too far from baseline for her.  She is tolerating oral intake.  She has chronic abdominal pain which is fairly close to her baseline.  The patient's blood sugars are still quite uncontrolled.       Objective:    Vital Signs:  Temp:  [98.1 °F (36.7 °C)-98.5 °F (36.9 °C)] 98.1 °F (36.7 °C)  Heart Rate:  [] 97  Resp:  [16] 16  BP: (115-135)/(58-83) 120/67    SpO2:  [94 %-96 %] 96 %  on   ;   Device (Oxygen Therapy): room air  Body mass index is 29.94 kg/m².    Physical Exam   Constitutional: She appears well-developed and well-nourished.   HENT:   Head: Normocephalic and atraumatic.   Cardiovascular: Normal rate and regular rhythm.   No murmur heard.  Pulmonary/Chest: Effort normal and breath sounds normal.   Abdominal: Soft. Bowel sounds are normal. She exhibits no distension. There is no tenderness.   Musculoskeletal:   Left arm in a sling   Neurological: She is alert.   Skin: Skin is warm and dry.     Type 2 diabetes mellitus  with hyperglycemia, with long-term current use of insulin (CMS/HCC)    Thrombocytopenia (CMS/HCC)    Cirrhosis of liver (CMS/HCC)    Rheumatoid arthritis (CMS/HCC)    Anxiety and depression    DUKES (nonalcoholic steatohepatitis)    Hyperlipidemia    BRICE (obstructive sleep apnea)    Gastroparesis    Hyperammonemia (CMS/HCC)    Hyponatremia    Anemia        Assessment/Plan:    Type 2 diabetes mellitus with hyperglycemia, with long-term current use of insulin (CMS/HCC)  -Blood sugars are much better controlled compared to admission.  But still running 200s to 300s.  -Endocrinology is following and they have increased her Lantus to 55 and are continuing with Humalog 10 with meals plus sliding scale insulin.      Thrombocytopenia (CMS/HCC)  -platelets are stable      Cirrhosis of liver (CMS/HCC)  -LFT      Rheumatoid arthritis (CMS/HCC)      Anxiety and depression      DUKES (nonalcoholic steatohepatitis)      Hyperlipidemia      BRICE (obstructive sleep apnea)      Gastroparesis      Hyperammonemia (CMS/HCC)      Hyponatremia      Anemia  -Hemoglobin level is slightly lower than baseline about 10    Plan:  Chronic vomiting and abdominal pain which is fairly stable.  Assuming patient's blood sugars stabilize overnight possible d/c tomorrow    Jose Almanza MD  12/15/18  4:41 PM              Electronically signed by Jose Almanza MD at 12/15/2018  4:48 PM     Dionisio Altman MD at 12/15/2018 10:41 AM            ENDOCRINE    Subjective   AND PLANS  Silvia Zabala is a 58 y.o. female.     Follow-up diabetes and related disorders.    Feels better today.  No nausea or vomiting.  Fasting glucose 262.  Random glucose 102-213.  Increase Lantus to 55 units every evening.  Continue Humalog 10 units with each meal plus sliding scale.    Check urine microalbumin.  Check lipid profile, vitamin D, and insulin antibodies in AM  Check thyroid function tests    Objective   /58 (BP Location: Right arm, Patient Position:  "Lying)   Pulse 95   Temp 98.1 °F (36.7 °C) (Oral)   Resp 16   Ht 168.9 cm (66.5\")   Wt 85.4 kg (188 lb 4.4 oz)   SpO2 95%   BMI 29.94 kg/m²    Physical Exam    Awake, alert, not in distress.  No rales or wheezes  Regular heart rate and rhythm.  Abdomen soft, nontender.  +1 leg edema.  No calf tenderness.  No cyanosis.      Type 2 diabetes mellitus with hyperglycemia, with long-term current use of insulin (CMS/HCC)    Cirrhosis of liver (CMS/HCC)    Rheumatoid arthritis (CMS/HCC)    Anxiety and depression    DUKES (nonalcoholic steatohepatitis)    Hyperlipidemia    Thrombocytopenia (CMS/HCC)    BRICE (obstructive sleep apnea)    Gastroparesis    Hyperammonemia (CMS/HCC)    Hyponatremia    Anemia    Insulin therapy as discussed above.  Check lipids and thyroid function tests.  Other labs as discussed above.          Electronically signed by Dionisio Altman MD at 12/15/2018 10:46 AM     Jose Almanza MD at 12/14/2018 10:58 AM              [facility]     LOS: 0 days     Subjective:    CC:Hyperglycemia    DIET:  Diet Order   Procedures   • Diet Regular; Cardiac, Consistent Carbohydrate     She has fallen twice in last couple days    No chest pain   Up to the bathroom   Every once in a while gets soa thought be from abdominal swelling     Objective:    Vital Signs:  Temp:  [98.4 °F (36.9 °C)-98.8 °F (37.1 °C)] 98.8 °F (37.1 °C)  Heart Rate:  [] 107  Resp:  [16-20] 16  BP: (131-151)/(65-82) 131/82    SpO2:  [97 %-100 %] 97 %  on   ;   Device (Oxygen Therapy): room air  Body mass index is 29.94 kg/m².    Physical Exam   Constitutional: She appears well-developed and well-nourished.   HENT:   Head: Normocephalic and atraumatic.   Cardiovascular: Normal rate and regular rhythm.   No murmur heard.  Pulmonary/Chest: Effort normal and breath sounds normal.   Abdominal: Soft. Bowel sounds are normal. She exhibits no distension. There is no tenderness.   Musculoskeletal:   Left arm in a " sling   Neurological: She is alert.   Skin: Skin is warm and dry.     Type 2 diabetes mellitus with hyperglycemia, with long-term current use of insulin (CMS/HCC)    Thrombocytopenia (CMS/HCC)    Cirrhosis of liver (CMS/HCC)    Rheumatoid arthritis (CMS/HCC)    Anxiety and depression    DUKES (nonalcoholic steatohepatitis)    Hyperlipidemia    BRICE (obstructive sleep apnea)    Gastroparesis    Hyperammonemia (CMS/HCC)    Hyponatremia    Anemia        Assessment/Plan:    Type 2 diabetes mellitus with hyperglycemia, with long-term current use of insulin (CMS/HCC)      Thrombocytopenia (CMS/HCC)      Cirrhosis of liver (CMS/HCC)      Rheumatoid arthritis (CMS/HCC)      Anxiety and depression      DUKES (nonalcoholic steatohepatitis)      Hyperlipidemia      BRICE (obstructive sleep apnea)      Gastroparesis      Hyperammonemia (CMS/HCC)      Hyponatremia      Anemia    Plan:  Consult to access, GI, endo, and ortho.    >25 minutes spent, > 1/2 time spent counseling and coordination of care     Jose Almanza MD  12/14/18  5:14 PM            Moving left hand in all directions              Electronically signed by Jose Almanza MD at 12/14/2018  5:19 PM          Consult Notes (all)      Rich Coleman MD at 12/14/2018  8:48 PM      Consult Orders    1. Inpatient Gastroenterology Consult [[id number]] ordered by Jose Morales MD at 12/14/18 0115                     [facility]/[place]              Initial Inpatient Consult Note  Referring Provider: Andrew  Reason for Consultation: Cirrhosis    Subjective     History of present illness:  Patient with end-stage liver disease secondary to DUKES.  She is had numerous issues over the past year which relate to severe hyperglycemia, nausea thought to be due to diabetic gastroparesis, chronic abdominal pain which appears to be refractory to manipulation, and hepatic encephalopathy which waxes and wanes.  She has been treated with promotility  "agents.  She developed tardive dyskinesia and those medications have been completely discontinued.  She was admitted with profound fatigue and was found to have severe hyperglycemia and has been under therapy for that.  From a more chronic perspective, she voices no symptoms today that she is not presented on multiple occasions with in the office.  Her biggest concern was a fracture of the left shoulder and there is ongoing consideration for the possibility of this being a pathologic fracture.    Allergies:  Allergies   Allergen Reactions   • Albuterol Anaphylaxis   • Tramadol Nausea Only, Other (See Comments) and GI Intolerance     Per pt causes her \"palsy, meaning shaking and tremors\"    • Lactulose Nausea And Vomiting and Other (See Comments)     Severe abdominal pain   • Quinine Derivatives Nausea And Vomiting       Review of Systems  Pertinent items are noted in HPI, all other systems reviewed and negative    Objective     Vital Signs  Temp:  [98.2 °F (36.8 °C)-98.8 °F (37.1 °C)] 98.2 °F (36.8 °C)  Heart Rate:  [] 99  Resp:  [16-20] 16  BP: (131-151)/(68-82) 135/80    Physical Exam:     General Appearance:    Alert, cooperative, in no acute distress   Head:    Normocephalic, without obvious abnormality, atraumatic   Eyes:            Lids and lashes normal, conjunctivae and sclerae normal, no   icterus, no pallor, corneas clear, PERRLA   Ears:    Ears appear intact with no abnormalities noted   Throat:   No oral lesions, no thrush, oral mucosa moist   Neck:   No adenopathy, supple, trachea midline, no thyromegaly, no     carotid bruit, no JVD   Back:     No kyphosis present, no scoliosis present, no skin lesions,       erythema or scars, no tenderness to percussion or                   palpation,   range of motion normal   Lungs:     Clear to auscultation,respirations regular, even and                   unlabored    Heart:    Regular rhythm and normal rate, normal S1 and S2, no            murmur, no " gallop, no rub, no click   Breast Exam:    Deferred   Abdomen:     Mildly distended and tender consistent with prior examinations    Genitalia:    Deferred   Extremities:   Moves all extremities well, , no cyanosis, 3+ pitting edema   Pulses:   Pulses palpable and equal bilaterally   Skin:   No bleeding, bruising or rash   Lymph nodes:   No palpable adenopathy   Neurologic:   Cranial nerves 2 - 12 grossly intact, sensation intact, DTR        present and equal bilaterally. Tardive dyskinesia is present. No asterixis.        Assessment/Plan       Type 2 diabetes mellitus with hyperglycemia, with long-term current use of insulin (CMS/HCC)    Cirrhosis of liver (CMS/HCC)    Rheumatoid arthritis (CMS/HCC)    Anxiety and depression    DUKES (nonalcoholic steatohepatitis)    Hyperlipidemia    Thrombocytopenia (CMS/HCC)    BRICE (obstructive sleep apnea)    Gastroparesis    Hyperammonemia (CMS/HCC)    Hyponatremia    Anemia      Patient has end-stage liver disease secondary to DUKES.  Her diabetes has been extremely difficult to control and this likely is aggravated by her gastroparesis which in turn is aggravated by her severe hyperglycemia.  She is been in this vicious cycle for several months.  Metoclopramide is contraindicated.  We can consider intravenous erythromycin right now her symptoms do not appear to warrant it.    Cross coverage will be observed available this weekend but they will not see unless called to do so.    I discussed the patients findings and my recommendations with patient    Rich Coleman MD  [facility]/[place]  12/14/18  8:49 PM                Electronically signed by Rich Coleman MD at 12/14/2018  8:55 PM     Merary Burnett MD at 12/14/2018 11:25 AM          58 y.o.  Patient Care Team:  Blanca Pitts MD as PCP - General (Internal Medicine)  Blanca Pitts MD as PCP - Claims Attributed  Sukhdeep Mcdowell MD as Consulting Physician (Hematology and Oncology)  Hong  Blanca STANFORD MD as Referring Physician (Internal Medicine)  Rich Coleman MD as Consulting Physician (Gastroenterology)  Merary Burnett MD as Consulting Physician (Endocrinology)      Chief Complaint   Patient presents with   • Hyperglycemia       HPI     58-year-old white female with a complicated past medical history doing very poorly in terms of her severely uncontrolled type 2 diabetes mellitus, gastroparesis and ITP.  Patient was last seen by me on December 11, 2018.  She presents to the hospital with significantly elevated blood sugars.     Type 2 diabetes mellitus  Diagnosed in her 20s.  Has been on insulin since then.  In April 2018 patient has been started on U-500 insulin due to her severely elevated blood sugars.  Due to her gastroparesis patient's oral intake is extremely variable and as a result she takes her insulin when she eats and does not take her insulin when she is not which further complicates her blood sugar control.  At home she is on U-500 insulin 65 units with each meal, 35 units with snacks along with the sliding scale-5 units for 50 about 1 50 mg/dL range.  She recently went to [place] supposed to stay only for a week but the stay was complicated with her feeling sick due to gastroparesis flareup, sustained a fall, when she slipped and fell in dark. She broke her collarbone and the tip of her humerus.  Which is currently in a sling and per pt cannot operated.   Being seen by orthopedic here in hospital.   She does not check her blood sugars when she is not eating.  Average blood sugars at around 300s range.  Lowest blood sugar was 100 mg/dL range.  Last eye examination was in January 2018 and no history of diabetic retinopathy.  Does have history of diabetic neuropathy and is on Lyrica 75 mg twice daily.  Diet is extremely limited due to her history of gastroparesis and also the fact that she cannot have her dentures in.      Tried DEXCOM - cgm for the pt which could monitor her BG trends  and as a result we could be aggressive with her BG control but that wasnt covered by her insurance.      Pancreatic divisum : No hx of pancreatitis per pt.       Liver disease/DUKES -  Sees Dr. Jordan from [facility].       Gastroparesis - Sees Dr. Coleman. Getting worse.  Not a candidate for stimulator due to ITP      ITP - diagnosed in May 2018. Currently stable. Seen by Hematology and oncology.         Review of Systems   Constitutional: Positive for appetite change and fatigue. Negative for fever.   Eyes: Negative for visual disturbance.   Respiratory: Negative for shortness of breath.    Cardiovascular: Negative for palpitations and leg swelling.   Gastrointestinal: Positive for abdominal pain and nausea. Negative for vomiting.   Endocrine: Positive for polyuria.   Musculoskeletal: Negative for joint swelling and neck pain.   Skin: Negative for rash.   Neurological: Positive for weakness. Negative for numbness.   Psychiatric/Behavioral: Negative for behavioral problems.     Objective     Vital Signs  Temp:  [98.4 °F (36.9 °C)-98.7 °F (37.1 °C)] 98.4 °F (36.9 °C)  Heart Rate:  [] 102  Resp:  [16-20] 16  BP: (138-151)/(65-80) 143/68    Physical Exam  Physical Exam   Constitutional: She is oriented to person, place, and time. She appears well-nourished.   HENT:   Head: Normocephalic and atraumatic.   No teeth   Eyes: Conjunctivae and EOM are normal. No scleral icterus.   Neck: Normal range of motion. Neck supple. No thyromegaly present.   Cardiovascular: Normal rate and normal heart sounds. Exam reveals no friction rub.   No murmur heard.  Pulmonary/Chest: Effort normal and breath sounds normal. No stridor. She has no wheezes. She has no rales.   Abdominal: Soft. Bowel sounds are normal. She exhibits distension. There is no tenderness.   Musculoskeletal: She exhibits tenderness and deformity. She exhibits no edema.   Left shoulder in sling   Lymphadenopathy:     She has no cervical adenopathy.   Neurological: She is  "alert and oriented to person, place, and time.   Skin: Skin is warm and dry. She is not diaphoretic.   Psychiatric: She has a normal mood and affect.   Vitals reviewed.        Assessment/Plan     Active Hospital Problems    Diagnosis Date Noted   • **Type 2 diabetes mellitus with hyperglycemia, with long-term current use of insulin (CMS/HCC) [E11.65, Z79.4] 12/13/2018   • BRICE (obstructive sleep apnea) [G47.33] 12/13/2018   • Gastroparesis [K31.84] 12/13/2018   • Hyperammonemia (CMS/HCC) [E72.20] 12/13/2018   • Hyponatremia [E87.1] 12/13/2018   • Anemia [D64.9] 12/13/2018   • Thrombocytopenia (CMS/HCC) [D69.6] 05/16/2018   • Hyperlipidemia [E78.5]    • DUKES (nonalcoholic steatohepatitis) [K75.81] 06/14/2016   • Cirrhosis of liver (CMS/[facility]) [K74.60] 03/08/2016   • Rheumatoid arthritis (CMS/[facility]) [M06.9] 03/08/2016   • Anxiety and depression [F41.9, F32.9] 03/08/2016      Resolved Hospital Problems   No resolved problems to display.     Type 2 diabetes mellitus-severely uncontrolled, complicated with gastroparesis  Will stop  Insulin U-500 while the patient is in the hospital.  Will start patient on levemir 50 units at bedtime  Will cover with Humalog 10 units with each meal  Will cover with high dose Humalog sliding scale 3 times a day before meals and at bedtime.    Patient's variable by mouth intake due to gastroparesis and not able to have her dentures in makes it extremely challenging to control her blood sugars.      Gastroparesis  Complicating the control of her diabetes.    Placed continues glucose monitoring-free style jesusita to help patient monitor her blood sugars and to continue to take her U-500 insulin at home, this way at least she is getting insulin for her elevated blood sugars.    Discussed plan with nurse.     Thank you for your consultation.  Will follow the pt while in hospital.     Merary Burnett MD.  12/14/18  11:25 AM      EMR Dragon / transcription disclaimer:    \"Dictated utilizing Dragon " "dictation\".     Electronically signed by Merary Burnett MD at 12/14/2018  3:32 PM     Taz Lozoya MD at 12/14/2018 10:30 AM      Consult Orders    1. Inpatient Orthopedic Surgery Consult [[id number]] ordered by Jose Morales MD at 12/14/18 0115                  Orthopedic Consult      Patient: Silvia Zabala  YOB: 1960     Date of Admission: 12/13/2018  6:26 PM    Medical Record Number: [id number]    Attending Physician: Jose Almanza MD    Consulting Physician: MD Darell    Reason for Consult: Left Shoulder pain - 4 week old Proximal humerus fracture.     History of Present Illness: 58 y.o. female admitted to [facility] with Anasarca [R60.1]  DUKES (nonalcoholic steatohepatitis) [K75.81]  Closed 2-part displaced fracture of surgical neck of left humerus, initial encounter [S42.222A]  Type 2 diabetes mellitus with hyperglycemia, with long-term current use of insulin (CMS/[facility]) [E11.65, Z79.4]  Depression, unspecified depression type [F32.9].     The patient was admitted for generalised swelling of her body  and was evaluated for  a left proximal humerus fracture.      Secondary to the age and multiple medical co morbidities the patient was admitted to the hospitalist.   As I was on call for the emergency room I was consulted for further evaluation and treatment.     She states that she fell off her bed approximately 4 weeks ago while in [place]. She was diagnosed at that time with a proximal humerus fracture. She was placed into a sling and swathe at that time. She was doing better with regards to her pain, however 3-4 days ago she fell into a wall as a result of baseline gait instability. She had increased pain in the left shoulder and subsequently presented to the ED. Currently the pt reports the pain to be localized to the shoulder without radiation. She denies numbness or paresthesias. She had a hx of left shoulder pain prior to her index fall 4 weeks ago, when " she saw Dr. Roa for and was diagnosed with rotator cuff pathology. She denies any personal hx of cancer. She has had weight fluctuations, however this is related to her ascites.      Denies any history of fevers, chills, gout, other joint pains.     The patient is alone at the time of this hospital visit.     Patient is a  home/ community ambulator. Patient denies/ uses walker/cane assistive device.     The patient  lives at home with   , is active and independent in activities of daily living, but does have difficulty with ambulation due to her gait instability.     The patient denies history of dementia.    Past medical history, Past surgical history, family history, Social history, current medications, home medications Have been reviewed by me.  Current Medications:  Scheduled Meds:  amitriptyline 50 mg Oral Nightly   cyclobenzaprine 5 mg Oral Nightly   DULoxetine 90 mg Oral Daily   folic acid 1 mg Oral Daily   furosemide 40 mg Oral Daily   insulin lispro 0-24 Units Subcutaneous 4x Daily With Meals & Nightly   Insulin Regular Human (Conc) 65 Units Subcutaneous TID With Meals   levETIRAcetam 500 mg Oral Q12H   multivitamin with minerals 1 tablet Oral Daily   pantoprazole 40 mg Oral QAM   pregabalin 75 mg Oral Q12H   rifaximin 550 mg Oral BID   sodium chloride 3 mL Intravenous Q12H   spironolactone 100 mg Oral Daily   sucralfate 1 g Oral BID   trimethobenzamide 300 mg Oral TID   vitamin B-12 1,000 mcg Oral Daily     Continuous Infusions:   PRN Meds:.•  ALPRAZolam  •  dextrose  •  dextrose  •  glucagon (human recombinant)  •  hydrOXYzine  •  ondansetron **OR** ondansetron ODT **OR** ondansetron  •  oxyCODONE  •  promethazine  •  sodium chloride  •  [COMPLETED] Insert peripheral IV **AND** sodium chloride    Review of Systems:   A 12 point system review was reviewed with the patient and from the chart  and is negative except as mentioned in history of present illness.      Physical Exam: 58 y.o. female       "              Vitals:    12/13/18 2002 12/13/18 2033 12/13/18 2337 12/14/18 0723   BP: 138/74 146/73 151/80 143/68   BP Location:   Right arm Right arm   Patient Position:   Sitting Lying   Pulse: 98 98 107 102   Resp:   20 16   Temp:   98.7 °F (37.1 °C) 98.4 °F (36.9 °C)   TempSrc:   Oral Oral   SpO2: 100% 99% 100% 99%   Weight:   85.4 kg (188 lb 4.4 oz)    Height:   168.9 cm (66.5\")         Gait: Not evaluated.     Mental/HEENT/cardio/skin: The patient's general appearance was well-nourished, well-hydrated, no acute distress.  Orientation was alert and oriented ×3.  The patient's mood was normal.   Pulmonary exam shows normal late exchange, no labored breathing, or shortness of breath.    The  skin exam showed normal temperature and color in the area of examination.    Extremities:  LUE  Sling and swathe in place, no gross deformity  ROM of shoulder restricted due to known fracture  Motor intact in AIN/PIN/Median/Ulnar/Radial/Axillary  Sensation intact in Median/Ulnar/Radial/Axilary  +Radial pulse, brisk cap refill         Assessment: 59 yo RHD F with subacute left proximal humerus fracture    Patient Active Problem List   Diagnosis   • Cirrhosis of liver (CMS/HCC)   • Rheumatoid arthritis (CMS/HCC)   • Anxiety and depression   • Type 2 diabetes mellitus, uncontrolled, with neuropathy (CMS/HCC)   • Fibrositis   • Change in blood platelet count   • Pancytopenia (CMS/HCC)   • Hypersplenism   • Nausea   • DUKES (nonalcoholic steatohepatitis)   • Hyperlipidemia   • Abdominal pain   • Hematemesis   • Ascites   • Portal hypertension (CMS/HCC)   • Systemic lupus (CMS/HCC)   • Secondary esophageal varices without bleeding (CMS/HCC)   • Gastroparesis   • Cirrhosis of liver with ascites (CMS/HCC)   • Diabetic ketoacidosis without coma associated with type 2 diabetes mellitus (CMS/HCC)   • Diabetic peripheral neuropathy (CMS/HCC)   • History of seizure disorder   • Hepatic encephalopathy (CMS/HCC)   • Abnormal finding of " blood chemistry    • Thrombocytopenia (CMS/HCC)   • Fever   • Acute ITP (CMS/HCC)   • Degeneration of lumbosacral intervertebral disc   • Seizure (CMS/HCC)   • Spondylosis without myelopathy   • Intractable vomiting with nausea   • UGI bleed   • Migraine without aura   • Urinary retention   • Type 2 diabetes mellitus with hyperglycemia, with long-term current use of insulin (CMS/HCC)   • BRICE (obstructive sleep apnea)   • Gastroparesis   • Hyperammonemia (CMS/HCC)   • Hyponatremia   • Anemia       Plan:    Options and alternatives were discussed in detail with the patient.   We have discussed nonoperative treatment/ reverse total shoulder replacement. In either case she is likely to have some restricted range of motion of the left shoulder permanent.   At this time, given the chronicity of her injury, we are recommending continued non operative management. I suspect she may have sustained a refracture through her previous fracture site as a result of her fall into the wall 3-4 days ago. Will maintain her in a sling and swathe for comfort. Plan to transition out of the immobilization over the next 2 weeks to begin AROM of the shoulder.     The radiology report suggests a possible pathologic fracture, but appears to  Me likely changes due to healing fracture.     Date: 12/14/2018    Artemio Eng MD     CC: Blanca Pitts MD; MD Archie Blackman Stephen A, MD     Addendum:    I have personally examined this patient. I agree with the above findings and treatment  plan. I will sign off but will be available. Patient is advised to follow up with me in the office in 3-4 weeks.     Taz Lozoya MD  12/16/2018  Electronically signed by Taz Lozoya MD at 12/16/2018  5:54 PM       All medication doses during the admission are shown, including meds that are no longer on order.   Scheduled Meds Sorted by Name   for Silvia Zabala as of 12/11/18 through 12/17/18     1 Day 3 Days 7 Days 10  Days < Today >    Legend:                          Inactive     Active     Other Encounter    Linked               Medications 12/11/18 12/12/18 12/13/18 12/14/18 12/15/18 12/16/18 12/17/18   amitriptyline (ELAVIL) tablet 50 mg   Dose: 50 mg  Freq: Nightly Route: PO  Start: 12/14/18 0030       0126     2119 2046 2142 2100        cholecalciferol (VITAMIN D3) tablet 1,000 Units   Dose: 1,000 Units  Freq: Daily Route: PO  Start: 12/16/18 1100         1140      0926        cyclobenzaprine (FLEXERIL) tablet 5 mg   Dose: 5 mg  Freq: Nightly Route: PO  Start: 12/13/18 2233       ([id number])     2120 2046 2143 2100        DULoxetine (CYMBALTA) DR capsule 90 mg   Dose: 90 mg  Freq: Daily Route: PO  Start: 12/14/18 0900    Admin Instructions:   Caution: Look alike/sound alike drug alert Do not crush or chew capsule.       0900      0805      0823      0925        folic acid (FOLVITE) tablet 1 mg   Dose: 1 mg  Freq: Daily Route: PO  Start: 12/14/18 0900       0900      0805      0823      0925        furosemide (LASIX) tablet 40 mg   Dose: 40 mg  Freq: Daily Route: PO  Start: 12/18/18 0900             furosemide (LASIX) tablet 40 mg   Dose: 40 mg  Freq: Daily Route: PO  Start: 12/14/18 0900 End: 12/16/18 1134       0900      0805      0823     1134-D/C'd       insulin glargine (LANTUS) injection 32 Units   Dose: 32 Units  Freq: Every 12 Hours Scheduled Route: SC  Start: 12/16/18 2100    Admin Instructions:            2143 0922     2100        insulin glargine (LANTUS) injection 50 Units   Dose: 50 Units  Freq: Nightly Route: SC  Start: 12/14/18 2100 End: 12/15/18 1043    Admin Instructions:          2118      1043-D/C'd        insulin glargine (LANTUS) injection 55 Units   Dose: 55 Units  Freq: Nightly Route: SC  Start: 12/15/18 2100 End: 12/16/18 1011    Admin Instructions:           2153 [C]      1011-D/C'd       insulin lispro (humaLOG) injection 0-24 Units   Dose: 0-24 Units  Freq: 4 Times  Daily With Meals & Nightly Route: SC  Start: 12/13/18 2223    Admin Instructions:   Correction - High Dose.  Greater than 80 units/day total insulin dose or, on steroids, insulin resistant, infection.    Blood glucose 150-199 mg/dL - 4 units  Blood glucose 200-249 mg/dL - 8 units  Blood glucose 250-299 mg/dL - 12 units  Blood glucose 300-349 mg/dL - 16 units  Blood glucose 350-400 mg/dL - 20 units  Blood glucose greater than 400 mg/dL - 24 units and call provider   Caution: Look alike/sound alike drug alert       ([id number])     0143 [C]     0854     1157 [phone]      0806     1148     1705     2150 [C]      0823     1141     1714 ([id number])      0936     1200     1800     2100        insulin lispro (humaLOG) injection 10 Units   Dose: 10 Units  Freq: 3 Times Daily With Meals Route: SC  Start: 12/14/18 1215 End: 12/17/18 0927    Admin Instructions:    Caution: Look alike/sound alike drug alert       1158     ([id number]) [C]      0806     1148     1706      0823     ([id number])     1719      0922     0927-D/C'd      insulin lispro (humaLOG) injection 12 Units   Dose: 12 Units  Freq: 3 Times Daily With Meals Route: SC  Start: 12/17/18 1200    Admin Instructions:    Caution: Look alike/sound alike drug alert          1200     1800        insulin regular (humuLIN R) 500 UNIT/ML CONCENTRATED injection 65 Units   Dose: 65 Units  Freq: 3 Times Daily With Meals Route: SC  Start: 12/14/18 0800 End: 12/14/18 1013    Admin Instructions:   **Note High CONCENTRATION - 500 unit/mL**         0855     1013-D/C'd         insulin regular (HumuLIN R) CONCENTRATED injection   Dose: 65 Units  Freq: 3 Times Daily With Meals Route: SC  Start: 12/14/18 1200 End: 12/14/18 1125    Admin Instructions:   **Note High CONCENTRATION - 500 unit/mL**   Caution: Look alike/sound alike drug alert       1125-D/C'd         insulin regular (humuLIN R,novoLIN R) injection 10 Units   Dose: 10 Units  Freq: Once Route: SC  Start: 12/13/18 2108 End: 12/13/18  2200    Admin Instructions:    Caution: Look alike/sound alike drug alert      2200            insulin regular (humuLIN R,novoLIN R) injection 10 Units   Dose: 10 Units  Freq: Once Route: IV  Start: 12/13/18 1837 End: 12/13/18 1909    Admin Instructions:    Caution: Look alike/sound alike drug alert      1909            levETIRAcetam (KEPPRA) tablet 500 mg   Dose: 500 mg  Freq: Every 12 Hours Scheduled Route: PO  Start: 12/14/18 0030    Admin Instructions:   Swallow whole; do not crush, chew, or split tablet.       0126     0900     2120      0805     2047      0823     2142      0926     2100        multivitamin with minerals 1 tablet   Dose: 1 tablet  Freq: Daily Route: PO  Start: 12/14/18 0900    Admin Instructions:          0900      0805      0823      0926        pantoprazole (PROTONIX) EC tablet 40 mg   Dose: 40 mg  Freq: Every Morning Route: PO  Start: 12/14/18 0700    Admin Instructions:   Swallow whole; do not crush, split, or chew.       0647      0649      0621      0925        pregabalin (LYRICA) capsule 75 mg   Dose: 75 mg  Freq: Every 12 Hours Scheduled Route: PO  Start: 12/14/18 0141    Admin Instructions:          0637        2119      0805     2047      0822     2142      0925     2100        rifaximin (XIFAXAN) tablet 550 mg   Dose: 550 mg  Freq: 2 Times Daily Route: PO  Start: 12/14/18 0030       0126     0908     2120      0806     2047      0823     2142      0926     2100        sodium chloride 0.9 % bolus 1,000 mL   Dose: 1,000 mL  Freq: Once Route: IV  Last Dose: Stopped (12/13/18 2157)  Start: 12/13/18 1837 End: 12/13/18 2157 1905 2157            sodium chloride 0.9 % flush 3 mL   Dose: 3 mL  Freq: Every 12 Hours Scheduled Route: IV  Start: 12/13/18 2223 0137        2205         2100 2207 0927 2100        spironolactone (ALDACTONE) tablet 100 mg   Dose: 100 mg  Freq: Daily Route: PO  Start: 12/18/18 0900             spironolactone (ALDACTONE) tablet 100 mg    Dose: 100 mg  Freq: Daily Route: PO  Start: 12/14/18 0900 End: 12/16/18 1134       0900      0804      0823     1134-D/C'd       sucralfate (CARAFATE) tablet 1 g   Dose: 1 g  Freq: 2 Times Daily Route: PO  Start: 12/14/18 0030    Admin Instructions:   Take on an empty stomach.       0126     0900     2119      0805     2047      0823     2142      0926     2100        trimethobenzamide (TIGAN) capsule 300 mg   Dose: 300 mg  Freq: 3 Times Daily Route: PO  Start: 12/13/18 2233       0126     0900     1509     2120      0805     1553     2047      0822     1613     2142      0925     1600     2100        vitamin B-12 (CYANOCOBALAMIN) tablet 1,000 mcg   Dose: 1,000 mcg  Freq: Daily Route: PO  Start: 12/14/18 0900       0900      0805      0823      0926        Medications 12/11/18 12/12/18 12/13/18 12/14/18 12/15/18 12/16/18 12/17/18       Continuous Meds Sorted by Name   for Silvia Zabala as of 12/11/18 through 12/17/18    Legend:                          Inactive     Active     Other Encounter    Linked               Medications 12/11/18 12/12/18 12/13/18 12/14/18 12/15/18 12/16/18 12/17/18       PRN Meds Sorted by Name   for Silvia Zabala as of 12/11/18 through 12/17/18    Legend:                          Inactive     Active     Other Encounter    Linked               Medications 12/11/18 12/12/18 12/13/18 12/14/18 12/15/18 12/16/18 12/17/18   ALPRAZolam (XANAX) tablet 0.5 mg   Dose: 0.5 mg  Freq: 2 Times Daily PRN Route: PO  PRN Reason: Anxiety  Start: 12/14/18 0141    Admin Instructions:    Caution: Look alike/sound alike drug alert. Avoid grapefruit juice       2125 2044      2301         cyclobenzaprine (FLEXERIL) tablet 5 mg   Dose: 5 mg  Freq: 3 Times Daily PRN Route: PO  PRN Reason: Muscle Spasms  Start: 12/15/18 1550             dextrose (D50W) 25 g/ 50mL Intravenous Solution 25 g   Dose: 25 g  Freq: Every 15 Minutes PRN Route: IV  PRN Reason: Low Blood Sugar  PRN Comment: Blood Sugar Less Than  70  Start: 12/13/18 2220    Admin Instructions:   Blood sugar less than 70; patient has IV access - Unresponsive, NPO or Unable To Safely Swallow             dextrose (GLUTOSE) oral gel 15 g   Dose: 15 g  Freq: Every 15 Minutes PRN Route: PO  PRN Reason: Low Blood Sugar  PRN Comment: Blood sugar less than 70  Start: 12/13/18 2220    Admin Instructions:   BS<70, Patient Alert, Is not NPO, Can safely swallow.             glucagon (human recombinant) (GLUCAGEN DIAGNOSTIC) injection 1 mg   Dose: 1 mg  Freq: As Needed Route: SC  PRN Comment: Blood Glucose Less Than 70  Start: 12/13/18 2220    Admin Instructions:   Blood Glucose Less Than 70 - Patient Without IV Access - Unresponsive, NPO or Unable To Safely Swallow             hydrOXYzine (ATARAX) tablet 50 mg   Dose: 50 mg  Freq: 3 Times Daily PRN Route: PO  PRN Reason: Itching  Start: 12/13/18 2228    Admin Instructions:   Caution: Look alike/sound alike drug alert             ondansetron (ZOFRAN) tablet 4 mg   Dose: 4 mg  Freq: Every 6 Hours PRN Route: PO  PRN Reasons: Nausea,Vomiting  Start: 12/13/18 2221    Admin Instructions:   If BOTH ondansetron (ZOFRAN) and promethazine (PHENERGAN) are ordered use ondansetron first and THEN promethazine IF ondansetron is ineffective.       1509             0019            Or  ondansetron ODT (ZOFRAN-ODT) disintegrating tablet 4 mg   Dose: 4 mg  Freq: Every 6 Hours PRN Route: PO  PRN Reasons: Nausea,Vomiting  Start: 12/13/18 2221    Admin Instructions:   If BOTH ondansetron (ZOFRAN) and promethazine (PHENERGAN) are ordered use ondansetron first and THEN promethazine IF ondansetron is ineffective.  Place on tongue and allow to dissolve.                            Or  ondansetron (ZOFRAN) injection 4 mg   Dose: 4 mg  Freq: Every 6 Hours PRN Route: IV  PRN Reasons: Nausea,Vomiting  Start: 12/13/18 2221    Admin Instructions:   If BOTH ondansetron (ZOFRAN) and promethazine (PHENERGAN) are ordered use ondansetron first and THEN  promethazine IF ondansetron is ineffective.           0804     1553         2256         oxyCODONE (ROXICODONE) immediate release tablet 7.5 mg   Dose: 7.5 mg  Freq: Every 8 Hours PRN Route: PO  PRN Reasons: Moderate Pain ,Severe Pain   Start: 12/14/18 0141    Admin Instructions:       If given for pain, use the following pain scale:  Mild Pain = Pain Score of 1-3, CPOT 1-2  Moderate Pain = Pain Score of 4-6, CPOT 3-4  Severe Pain = Pain Score of 7-10, CPOT 5-8       0644     1510      0742     1553      0019     0822     2155         promethazine (PHENERGAN) tablet 25 mg   Dose: 25 mg  Freq: Every 6 Hours PRN Route: PO  PRN Reasons: Nausea,Vomiting  Start: 12/13/18 2231    Admin Instructions:          0126       0623     1202         sodium chloride 0.9 % flush 1-10 mL   Dose: 1-10 mL  Freq: As Needed Route: IV  PRN Reason: Line Care  Start: 12/13/18 2220             sodium chloride 0.9 % flush 10 mL   Dose: 10 mL  Freq: As Needed Route: IV  PRN Reason: Line Care  Start: 12/13/18 1834       0129           Medications 12/11/18 12/12/18 12/13/18 12/14/18 12/15/18 12/16/18 12/17/18

## 2018-12-17 NOTE — PROGRESS NOTES
[facility]     LOS: 1 day     Subjective:    CC:Hyperglycemia    DIET:  Diet Order   Procedures   • Diet Regular; GI Soft/[place], Low Fat     She has fallen twice in last couple days prior to admission.     Choked on food the day before    Clears will advance    She was admitted in [place] few weeks ago    Objective:    Vital Signs:  Temp:  [97.6 °F (36.4 °C)-98.7 °F (37.1 °C)] 97.6 °F (36.4 °C)  Heart Rate:  [90-99] 99  Resp:  [16] 16  BP: (108-114)/(62-77) 114/71    SpO2:  [92 %-96 %] 96 %  on   ;   Device (Oxygen Therapy): room air  Body mass index is 29.94 kg/m².    Physical Exam   Constitutional: She appears well-developed and well-nourished.   HENT:   Head: Normocephalic and atraumatic.   Cardiovascular: Normal rate and regular rhythm.   No murmur heard.  Pulmonary/Chest: Effort normal and breath sounds normal.   Abdominal: Soft. Bowel sounds are normal. She exhibits no distension. There is no tenderness.   Musculoskeletal:   Left arm in a sling   Neurological: She is alert.   Skin: Skin is warm and dry.       Results Review:    Glucose   Date Value Ref Range Status   12/16/2018 216 (H) 65 - 99 mg/dL Final     Results from last 7 days   Lab Units  12/17/18   0548   WBC 10*3/mm3  2.79*   HEMOGLOBIN g/dL  10.3*   HEMATOCRIT %  33.2*   PLATELETS 10*3/mm3  109*     Results from last 7 days   Lab Units  12/16/18   0433   SODIUM mmol/L  138   POTASSIUM mmol/L  4.0   CHLORIDE mmol/L  99   CO2 mmol/L  26.4   BUN mg/dL  11   CREATININE mg/dL  0.81   CALCIUM mg/dL  8.9   BILIRUBIN mg/dL  0.8   ALK PHOS U/L  202*   ALT (SGPT) U/L  22   AST (SGOT) U/L  26   GLUCOSE mg/dL  216*                 Cultures:       I have reviewed daily medications and changes in CPOE    Scheduled meds    amitriptyline 50 mg Oral Nightly   cholecalciferol 1,000 Units Oral Daily   cyclobenzaprine 5 mg Oral Nightly   DULoxetine 90 mg Oral Daily   folic acid 1 mg Oral Daily   [START ON 12/18/2018] furosemide 40 mg Oral  Daily   insulin glargine 32 Units Subcutaneous Q12H   insulin lispro 0-24 Units Subcutaneous 4x Daily With Meals & Nightly   insulin lispro 10 Units Subcutaneous TID With Meals   levETIRAcetam 500 mg Oral Q12H   multivitamin with minerals 1 tablet Oral Daily   pantoprazole 40 mg Oral QAM   pregabalin 75 mg Oral Q12H   rifaximin 550 mg Oral BID   sodium chloride 3 mL Intravenous Q12H   [START ON 12/18/2018] spironolactone 100 mg Oral Daily   sucralfate 1 g Oral BID   trimethobenzamide 300 mg Oral TID   vitamin B-12 1,000 mcg Oral Daily          PRN meds  •  ALPRAZolam  •  cyclobenzaprine  •  dextrose  •  dextrose  •  glucagon (human recombinant)  •  hydrOXYzine  •  ondansetron **OR** ondansetron ODT **OR** ondansetron  •  oxyCODONE  •  promethazine  •  sodium chloride  •  [COMPLETED] Insert peripheral IV **AND** sodium chloride        Type 2 diabetes mellitus with hyperglycemia, with long-term current use of insulin (CMS/HCC)    Thrombocytopenia (CMS/HCC)    Cirrhosis of liver (CMS/HCC)    Rheumatoid arthritis (CMS/HCC)    Anxiety and depression    DUKES (nonalcoholic steatohepatitis)    Hyperlipidemia    BRICE (obstructive sleep apnea)    Gastroparesis    Hyperammonemia (CMS/HCC)    Hyponatremia    Anemia    Vitamin D insufficiency        Assessment/Plan:  Choking spells- esophagram unremarkable, gi has seen    Type 2 diabetes mellitus with hyperglycemia, with long-term current use of insulin (CMS/HCC)  -Blood sugars are much better controlled compared to admission.  But still running 200s to 300s.  -Endocrinology is following  -at home she is on  65 with tid, 30 with snacks      Thrombocytopenia (CMS/HCC)  -platelets are stable      Cirrhosis of liver (CMS/HCC)  -LFT stable  -ammonia elevated on admission      Rheumatoid arthritis (CMS/HCC)  -she was on embrel and last dose was in May  -She sees Dr Munoz      Anxiety and depression      DUKES (nonalcoholic steatohepatitis) /  Hyperammonemia (CMS/HCC)  -lasix  and spironolactone  -xifaxan      Hyperlipidemia      BRICE (obstructive sleep apnea)      Gastroparesis  -tigan for nausea      Hyponatremia    Chronic pain medication  -lyrica, flexiri, cymbalta, elavil      Anemia  -Hemoglobin level is slightly lower than baseline about 10    Seizure-keppra, no seizure in a long time, probably 15 years    Plan:  Advance diet and see how she does    Jose Almanza MD  12/17/18  3:59 PM

## 2018-12-17 NOTE — PLAN OF CARE
Problem: Patient Care Overview  Goal: Plan of Care Review  Outcome: Ongoing (interventions implemented as appropriate)   12/17/18 2806   Coping/Psychosocial   Plan of Care Reviewed With patient   OTHER   Outcome Summary Has slept long intervals. Med for pain and nausea with good relief. LA sling in place. VSS. No s/s hyper/hypoglycemia. No changes. No distress   Plan of Care Review   Progress improving       Problem: Fall Risk (Adult)  Goal: Absence of Fall  Outcome: Ongoing (interventions implemented as appropriate)      Problem: Skin Injury Risk (Adult)  Goal: Skin Health and Integrity  Outcome: Ongoing (interventions implemented as appropriate)      Problem: Hyperglycemia, Persistent (Adult)  Goal: Signs and Symptoms of Listed Potential Problems Will be Absent, Minimized or Managed (Hyperglycemia, Persistent)  Outcome: Ongoing (interventions implemented as appropriate)

## 2018-12-17 NOTE — THERAPY TREATMENT NOTE
Acute Care - Physical Therapy Treatment Note  [facility]     Patient Name: Silvia Zabala  : 1960  MRN: [id number]  Today's Date: 2018  Onset of Illness/Injury or Date of Surgery: 18          Admit Date: 2018    Visit Dx:    ICD-10-CM ICD-9-CM   1. Type 2 diabetes mellitus with hyperglycemia, with long-term current use of insulin (CMS/HCC) E11.65 250.00    Z79.4 790.29     V58.67   2. Anasarca R60.1 782.3   3. DUKES (nonalcoholic steatohepatitis) K75.81 571.8   4. Depression, unspecified depression type F32.9 311   5. Closed 2-part displaced fracture of surgical neck of left humerus, initial encounter, non acute S42.222A 812.01     Patient Active Problem List   Diagnosis   • Cirrhosis of liver (CMS/HCC)   • Rheumatoid arthritis (CMS/HCC)   • Anxiety and depression   • Type 2 diabetes mellitus, uncontrolled, with neuropathy (CMS/HCC)   • Fibrositis   • Change in blood platelet count   • Pancytopenia (CMS/HCC)   • Hypersplenism   • Nausea   • DUKES (nonalcoholic steatohepatitis)   • Hyperlipidemia   • Abdominal pain   • Hematemesis   • Ascites   • Portal hypertension (CMS/HCC)   • Systemic lupus (CMS/HCC)   • Secondary esophageal varices without bleeding (CMS/HCC)   • Gastroparesis   • Cirrhosis of liver with ascites (CMS/HCC)   • Diabetic ketoacidosis without coma associated with type 2 diabetes mellitus (CMS/HCC)   • Diabetic peripheral neuropathy (CMS/HCC)   • History of seizure disorder   • Hepatic encephalopathy (CMS/HCC)   • Abnormal finding of blood chemistry    • Thrombocytopenia (CMS/HCC)   • Fever   • Acute ITP (CMS/HCC)   • Degeneration of lumbosacral intervertebral disc   • Seizure (CMS/HCC)   • Spondylosis without myelopathy   • Intractable vomiting with nausea   • UGI bleed   • Migraine without aura   • Urinary retention   • Type 2 diabetes mellitus with hyperglycemia, with long-term current use of insulin (CMS/HCC)   • BRICE (obstructive sleep apnea)   • Gastroparesis   •  Hyperammonemia (CMS/HCC)   • Hyponatremia   • Anemia   • Vitamin D insufficiency       Therapy Treatment    Rehabilitation Treatment Summary     [facility] Name 12/17/18 1000             Treatment Time/Intention    Discipline  physical therapy assistant  -      Document Type  therapy note (daily note)  -      Subjective Information  complains of;weakness;pain;dizziness  -SM      Mode of Treatment  physical therapy  -SM      Patient Effort  good  -SM      Existing Precautions/Restrictions  fall;non-weight bearing L UE, sling  -      Recorded by [] Sheila Mccormick, [facility] 12/17/18 1024      Row Name 12/17/18 1000             Cognitive Assessment/Intervention    Additional Documentation  Cognitive Assessment/Intervention (Group)  -SM      Recorded by [] Sheila Mccormick, [facility] 12/17/18 1024      Row Name 12/17/18 1000             Cognitive Assessment/Intervention- PT/OT    Orientation Status (Cognition)  oriented x 3  -SM      Follows Commands (Cognition)  WFL  -      Personal Safety Interventions  fall prevention program maintained;gait belt;nonskid shoes/slippers when out of bed  -SM      Recorded by [] Sheila Mccormick, [facility] 12/17/18 1024      Row Name 12/17/18 1000             Bed Mobility Assessment/Treatment    Bed Mobility Assessment/Treatment  supine-sit  -SM      Supine-Sit [place] (Bed Mobility)  contact guard  -      Sit-Supine [place] (Bed Mobility)  not tested  -      Assistive Device (Bed Mobility)  bed rails;head of bed elevated  -SM      Recorded by [] Sheila Mccormick, [facility] 12/17/18 1024      [facility] Name 12/17/18 1000             Transfer Assessment/Treatment    Transfer Assessment/Treatment  sit-stand transfer;stand-sit transfer;toilet transfer  -      Recorded by [] Sheila Mccormick, [facility] 12/17/18 1024      Row Name 12/17/18 1000             Sit-Stand Transfer    Sit-Stand [place] (Transfers)  contact guard  -      Assistive Device (Sit-Stand Transfers)  -- HHA   -SM      Recorded by [] Sheila Mccormick, [facility] 12/17/18 1024      Row Name 12/17/18 1000             Stand-Sit Transfer    Stand-Sit [place] (Transfers)  contact guard  -      Assistive Device (Stand-Sit Transfers)  -- HHA  -SM      Recorded by [] Johann Mccormickah Marci, [facility] 12/17/18 1024      Row Name 12/17/18 1000             Toilet Transfer    Type (Toilet Transfer)  sit-stand;stand-sit  -SM      [place] Level (Toilet Transfer)  contact guard  -SM      Assistive Device (Toilet Transfer)  raised toilet seat;grab bars/safety frame  -SM      Recorded by [] Sheila Mccormick, [facility] 12/17/18 1024      Row Name 12/17/18 1000             Gait/Stairs Assessment/Training    [place] Level (Gait)  contact guard;minimum assist (75% patient effort);2 person assist  -      Assistive Device (Gait)  -- HHA  -SM      Distance in Feet (Gait)  180  -SM      Pattern (Gait)  step-through  -SM      Deviations/Abnormal Patterns (Gait)  chica decreased;stride length decreased  -SM      Comment (Gait/Stairs)  several LOBs, requiring min A to recover; c/o dizziness and weakness  -SM      Recorded by [] Sheila Mccormick, [facility] 12/17/18 1024      Row Name 12/17/18 1000             Positioning and Restraints    Pre-Treatment Position  in bed  -SM      Post Treatment Position  chair  -SM      In Chair  reclined;call light within reach;encouraged to call for assist;exit alarm on  -SM      Recorded by [] Sheila Mccormick, [facility] 12/17/18 1024      Row Name 12/17/18 1000             Pain Assessment    Additional Documentation  Pain Scale: Numbers Pre/Post-Treatment (Group)  -SM      Recorded by [] Sheila Mccormick, [facility] 12/17/18 1024      Row Name 12/17/18 1000             Pain Scale: Numbers Pre/Post-Treatment    Pain Scale: Numbers, Pretreatment  4/10  -SM      Pain Scale: Numbers, Post-Treatment  4/10  -SM      Pain Location - Side  Left  -SM      Pain Location - Orientation  upper  -SM      Pain Location   extremity  -SM      Pain Intervention(s)  Repositioned;Ambulation/increased activity;Rest  -SM      Recorded by [] Sheila Mccormick PTA 12/17/18 1024        User Key  (r) = Recorded By, (t) = Taken By, (c) = Cosigned By    Initials Name Effective Dates Discipline     Johann Mccormickah EVERETT Covarrubias 03/07/18 -  PT                   Physical Therapy Education     Title: PT OT SLP Therapies (Done)     Topic: Physical Therapy (Done)     Point: Mobility training (Done)     Learning Progress Summary           Patient Acceptance, E,TB,D, VU,NR by  at 12/17/2018 10:24 AM    Acceptance, E, NR by AR at 12/15/2018  3:47 PM                   Point: Home exercise program (Done)     Learning Progress Summary           Patient Acceptance, E,TB,D, VU,NR by  at 12/17/2018 10:24 AM    Acceptance, E, NR by AR at 12/15/2018  3:47 PM                   Point: Body mechanics (Done)     Learning Progress Summary           Patient Acceptance, E,TB,D, VU,NR by  at 12/17/2018 10:24 AM    Acceptance, E, NR by AR at 12/15/2018  3:47 PM                   Point: Precautions (Done)     Learning Progress Summary           Patient Acceptance, E,TB,D, VU,NR by  at 12/17/2018 10:24 AM    Acceptance, E, NR by AR at 12/15/2018  3:47 PM                               User Key     Initials Effective Dates Name Provider Type Discipline    AR 04/03/18 -  Terri Garay, PT Physical Therapist PT     03/07/18 -  Sheila Mccormick PTA Physical Therapy Assistant PT                PT Recommendation and Plan     Plan of Care Reviewed With: patient  Progress: improving  Outcome Summary: Pt tolerated treatment well this date. Pt reported she felt more weak today and also c/o dizziness. Increased gait distance to 180ft w/ HHA. Pt demonstrated several LOBs, requiring min A to correct.  Outcome Measures     Row Name 12/17/18 1000 12/15/18 1500          How much help from another person do you currently need...    Turning from your back to your side  while in flat bed without using bedrails?  4  -SM  3  -AR     Moving from lying on back to sitting on the side of a flat bed without bedrails?  4  -SM  3  -AR     Moving to and from a bed to a chair (including a wheelchair)?  3  -SM  3  -AR     Standing up from a chair using your arms (e.g., wheelchair, bedside chair)?  3  -SM  3  -AR     Climbing 3-5 steps with a railing?  2  -SM  2  -AR     To walk in hospital room?  3  -SM  3  -AR     AM-PAC 6 Clicks Score  19  -SM  17  -AR        Functional Assessment    Outcome Measure Options  AM-PAC 6 Clicks Basic Mobility (PT)  -  --       User Key  (r) = Recorded By, (t) = Taken By, (c) = Cosigned By    Initials Name Provider Type    Terri Mccann, PT Physical Therapist    Sheila Pina PTA Physical Therapy Assistant         Time Calculation:   PT Charges     Row Name 12/17/18 1026             Time Calculation    Start Time  1000  -      Stop Time  1016  -      Time Calculation (min)  16 min  -      PT Received On  12/17/18  -      PT - Next Appointment  12/18/18  -        User Key  (r) = Recorded By, (t) = Taken By, (c) = Cosigned By    Initials Name Provider Type    Sheila Pina PTA Physical Therapy Assistant        Therapy Suggested Charges     Code   Minutes Charges    None           Therapy Charges for Today     Code Description Service Date Service Provider Modifiers Qty    00643656656 HC PT THER PROC EA 15 MIN 12/17/2018 Sheila Mccormick PTA GP 1    62578357902 HC PT THER SUPP EA 15 MIN 12/17/2018 Sheila Mccormick PTA GP 1          PT G-Codes  Outcome Measure Options: AM-PAC 6 Clicks Basic Mobility (PT)  AM-PAC 6 Clicks Score: 19  Functional Limitation: Mobility: Walking and moving around  Mobility: Walking and Moving Around Current Status (): At least 20 percent but less than 40 percent impaired, limited or restricted  Mobility: Walking and Moving Around Goal Status (): At least 1 percent but less than 20  percent impaired, limited or restricted    Sheila Mccormick, PTA  12/17/2018

## 2018-12-17 NOTE — PROGRESS NOTES
[facility]  Inpatient Progress Note    Reason for Follow Up: Dysphagia, choking while eating    Subjective     Interval History:   Esophagram performed-results reviewed.  She has no other GI complaints at present.    Current Facility-Administered Medications:   •  ALPRAZolam (XANAX) tablet 0.5 mg, 0.5 mg, Oral, BID PRN, Jose Morales MD, 0.5 mg at 12/16/18 2301  •  amitriptyline (ELAVIL) tablet 50 mg, 50 mg, Oral, Nightly, Karlene Del Cid, APRN, 50 mg at 12/16/18 2142  •  cholecalciferol (VITAMIN D3) tablet 1,000 Units, 1,000 Units, Oral, Daily, Dionisio Altman MD, 1,000 Units at 12/17/18 0926  •  cyclobenzaprine (FLEXERIL) tablet 5 mg, 5 mg, Oral, Nightly, Karlene Del Cid, APRN, 5 mg at 12/16/18 2143  •  cyclobenzaprine (FLEXERIL) tablet 5 mg, 5 mg, Oral, TID PRN, Jose Almanza MD  •  dextrose (D50W) 25 g/ 50mL Intravenous Solution 25 g, 25 g, Intravenous, Q15 Min PRN, Karlene Del Cid APRN  •  dextrose (GLUTOSE) oral gel 15 g, 15 g, Oral, Q15 Min PRN, Karlene Del Cid APRN  •  DULoxetine (CYMBALTA) DR capsule 90 mg, 90 mg, Oral, Daily, Karlene Del Cid APRN, 90 mg at 12/17/18 0925  •  folic acid (FOLVITE) tablet 1 mg, 1 mg, Oral, Daily, Karlene Del Cid APRN, 1 mg at 12/17/18 0925  •  [START ON 12/18/2018] furosemide (LASIX) tablet 40 mg, 40 mg, Oral, Daily, Jose Almanza MD  •  glucagon (human recombinant) (GLUCAGEN DIAGNOSTIC) injection 1 mg, 1 mg, Subcutaneous, PRN, Karlene Del Cid, APRN  •  hydrOXYzine (ATARAX) tablet 50 mg, 50 mg, Oral, TID PRN, [place], Karlene E, APRN  •  insulin glargine (LANTUS) injection 32 Units, 32 Units, Subcutaneous, Q12H, Dionisio Altman MD, 32 Units at 12/17/18 0922  •  insulin lispro (humaLOG) injection 0-24 Units, 0-24 Units, Subcutaneous, 4x Daily With Meals & Nightly, Karlene Del Cid APRN, 4 Units at 12/17/18 0925  •  insulin lispro (humaLOG) injection 10 Units, 10 Units, Subcutaneous, TID With Meals, Merary Burnett MD  •  levETIRAcetam  (KEPPRA) tablet 500 mg, 500 mg, Oral, Q12H, Karlene Del Cid, APRN, 500 mg at 12/17/18 0926  •  multivitamin with minerals 1 tablet, 1 tablet, Oral, Daily, Karlene Del Cid, APRN, 1 tablet at 12/17/18 0926  •  ondansetron (ZOFRAN) tablet 4 mg, 4 mg, Oral, Q6H PRN, 4 mg at 12/16/18 0019 **OR** ondansetron ODT (ZOFRAN-ODT) disintegrating tablet 4 mg, 4 mg, Oral, Q6H PRN **OR** ondansetron (ZOFRAN) injection 4 mg, 4 mg, Intravenous, Q6H PRN, Karlene Del Cid, APRN, 4 mg at 12/16/18 2256  •  oxyCODONE (ROXICODONE) immediate release tablet 7.5 mg, 7.5 mg, Oral, Q8H PRN, Jose Morales MD, 7.5 mg at 12/17/18 1049  •  pantoprazole (PROTONIX) EC tablet 40 mg, 40 mg, Oral, QAM, Karlene Del Cid, APRN, 40 mg at 12/17/18 0925  •  pregabalin (LYRICA) capsule 75 mg, 75 mg, Oral, Q12H, Jose Morales MD, 75 mg at 12/17/18 0925  •  promethazine (PHENERGAN) tablet 25 mg, 25 mg, Oral, Q6H PRN, Karlene Del Cid, APRN, 25 mg at 12/17/18 1100  •  rifaximin (XIFAXAN) tablet 550 mg, 550 mg, Oral, BID, Karlene Del Cid, APRN, 550 mg at 12/17/18 0926  •  sodium chloride 0.9 % flush 1-10 mL, 1-10 mL, Intravenous, PRN, Karlene Del Cid, APRN  •  [COMPLETED] Insert peripheral IV, , , Once **AND** sodium chloride 0.9 % flush 10 mL, 10 mL, Intravenous, PRN, Anisha East, APRN, 10 mL at 12/14/18 0129  •  sodium chloride 0.9 % flush 3 mL, 3 mL, Intravenous, Q12H, Karlene Del Cid, APRN, 3 mL at 12/17/18 0927  •  [START ON 12/18/2018] spironolactone (ALDACTONE) tablet 100 mg, 100 mg, Oral, Daily, Jose Almanza MD  •  sucralfate (CARAFATE) tablet 1 g, 1 g, Oral, BID, Karlene Del Cid, APRN, 1 g at 12/17/18 0926  •  trimethobenzamide (TIGAN) capsule 300 mg, 300 mg, Oral, TID, Karlene Del Cid, APRN, 300 mg at 12/17/18 0925  •  vitamin B-12 (CYANOCOBALAMIN) tablet 1,000 mcg, 1,000 mcg, Oral, Daily, Karlene Del Cid, APRN, 1,000 mcg at 12/17/18 0926  Review of Systems:    The following systems were reviewed and negative;  respiratory and  cardiovascular    Objective     Vital Signs  Temp:  [98.1 °F (36.7 °C)-98.7 °F (37.1 °C)] 98.7 °F (37.1 °C)  Heart Rate:  [] 90  Resp:  [16] 16  BP: (108-114)/(62-77) 108/62  Body mass index is 29.94 kg/m².    Intake/Output Summary (Last 24 hours) at 12/17/2018 1144  Last data filed at 12/17/2018 1038  Gross per 24 hour   Intake 420 ml   Output 2300 ml   Net -1880 ml     I/O this shift:  In: -   Out: 300 [Urine:300]     Physical Exam:   General: patient awake, alert and cooperative   Eyes: Normal lids and lashes, no scleral icterus   Neck: supple, normal ROM   Skin: warm and dry, not jaundiced   Abdomen: soft, nontender, distended   Psychiatric: Normal mood and behavior; memory intact     Results Review:     I reviewed the patient's new clinical results.    Results from last 7 days   Lab Units  12/17/18   0548  12/14/18   0448  12/13/18   1900   WBC 10*3/mm3  2.79*  3.15*  2.88*   HEMOGLOBIN g/dL  10.3*  9.6*  10.1*   HEMATOCRIT %  33.2*  30.4*  34.0*   PLATELETS 10*3/mm3  109*  100*  99*     Results from last 7 days   Lab Units  12/16/18   0433  12/14/18   0448  12/13/18   1900   SODIUM mmol/L  138  135*  131*   POTASSIUM mmol/L  4.0  3.8  4.8   CHLORIDE mmol/L  99  99  95*   CO2 mmol/L  26.4  22.5  21.9*   BUN mg/dL  11  9  11   CREATININE mg/dL  0.81  0.61  0.87   CALCIUM mg/dL  8.9  9.0  8.9   BILIRUBIN mg/dL  0.8  0.7  0.8   ALK PHOS U/L  202*  215*  247*   ALT (SGPT) U/L  22  25  31   AST (SGOT) U/L  26  34*  39*   GLUCOSE mg/dL  216*  225*  608*         Lab Results   Lab Value Date/Time    LIPASE 8 (L) 12/13/2018 1900    LIPASE 12 (L) 10/02/2018 2332    LIPASE 9 (L) 05/18/2018 0425    LIPASE 22 12/16/2017 1650    LIPASE 10 (L) 10/30/2017 1732    LIPASE 9 (L) 02/21/2017 1202    LIPASE 16 11/10/2014 0724       Radiology:  FL Esophagram Complete   Final Result      XR Chest 2 View   Final Result      XR Shoulder 2+ View Left   Final Result      XR Humerus Left   Final Result          Assessment/Plan      Patient Active Problem List   Diagnosis   • Cirrhosis of liver (CMS/HCC)   • Rheumatoid arthritis (CMS/HCC)   • Anxiety and depression   • Type 2 diabetes mellitus, uncontrolled, with neuropathy (CMS/HCC)   • Fibrositis   • Change in blood platelet count   • Pancytopenia (CMS/HCC)   • Hypersplenism   • Nausea   • DUKES (nonalcoholic steatohepatitis)   • Hyperlipidemia   • Abdominal pain   • Hematemesis   • Ascites   • Portal hypertension (CMS/HCC)   • Systemic lupus (CMS/HCC)   • Secondary esophageal varices without bleeding (CMS/HCC)   • Gastroparesis   • Cirrhosis of liver with ascites (CMS/HCC)   • Diabetic ketoacidosis without coma associated with type 2 diabetes mellitus (CMS/HCC)   • Diabetic peripheral neuropathy (CMS/HCC)   • History of seizure disorder   • Hepatic encephalopathy (CMS/HCC)   • Abnormal finding of blood chemistry    • Thrombocytopenia (CMS/HCC)   • Fever   • Acute ITP (CMS/HCC)   • Degeneration of lumbosacral intervertebral disc   • Seizure (CMS/HCC)   • Spondylosis without myelopathy   • Intractable vomiting with nausea   • UGI bleed   • Migraine without aura   • Urinary retention   • Type 2 diabetes mellitus with hyperglycemia, with long-term current use of insulin (CMS/HCC)   • BRICE (obstructive sleep apnea)   • Gastroparesis   • Hyperammonemia (CMS/HCC)   • Hyponatremia   • Anemia   • Vitamin D insufficiency     Assessment-  1.  Dysphagia-normal esophagram  2.Gastroparesis  3.  Cirrhosis    Plan-  - We'll ask speech to see for video [id number] on results and recs    I discussed the patients findings and my recommendations with patient.    Kimberly Gray MD

## 2018-12-17 NOTE — PLAN OF CARE
Problem: Patient Care Overview  Goal: Plan of Care Review  Outcome: Ongoing (interventions implemented as appropriate)   12/17/18 1024   Coping/Psychosocial   Plan of Care Reviewed With patient   OTHER   Outcome Summary Pt tolerated treatment well this date. Pt reported she felt more weak today and also c/o dizziness. Increased gait distance to 180ft w/ HHA. Pt demonstrated several LOBs, requiring min A to correct.   Plan of Care Review   Progress improving

## 2018-12-17 NOTE — PROGRESS NOTES
58 y.o.   LOS: 1 day   Patient Care Team:  Blanca Pitts MD as PCP - General (Internal Medicine)  Blanca Pitts MD as PCP - Claims Attributed  Sukhdeep Mcdowell MD as Consulting Physician (Hematology and Oncology)  Blanca Pitts MD as Referring Physician (Internal Medicine)  Rich Coleman MD as Consulting Physician (Gastroenterology)  Merary Burnett MD as Consulting Physician (Endocrinology)    Chief Complaint:  Elevated blood sugars    Chief Complaint   Patient presents with   • Hyperglycemia       Subjective   Patient reports that her nausea has significantly improved.  Over the weekend she choked on food and as a result her diet has been transitioned to clear liquid diet.  Blood sugars are decently controlled.    Interval History:    Review of Systems:   Review of Systems   Constitutional: Positive for appetite change and fatigue. Negative for fever.   Eyes: Negative for visual disturbance.   Respiratory: Negative for shortness of breath.    Cardiovascular: Negative for palpitations and leg swelling.   Gastrointestinal: Negative for abdominal pain and vomiting.   Endocrine: Negative for polydipsia and polyuria.   Musculoskeletal: Negative for joint swelling and neck pain.   Skin: Negative for rash.   Neurological: Negative for weakness and numbness.   Psychiatric/Behavioral: Negative for behavioral problems.     Objective     Vital Signs   Temp:  [98.1 °F (36.7 °C)-98.7 °F (37.1 °C)] 98.7 °F (37.1 °C)  Heart Rate:  [] 90  Resp:  [16] 16  BP: (108-114)/(62-77) 108/62    Physical Exam:  Physical Exam   Constitutional: She is oriented to person, place, and time. She appears well-nourished.   Obese     HENT:   Head: Normocephalic and atraumatic.   No teeth   Eyes: Conjunctivae and EOM are normal. No scleral icterus.   Neck: Normal range of motion. Neck supple. No thyromegaly present.   Cardiovascular: Normal rate and normal heart sounds.   Pulmonary/Chest: Effort normal and breath sounds normal. No  stridor. She has no wheezes.   Abdominal: Soft. Bowel sounds are normal. She exhibits no distension. There is no tenderness.   Central obesity   Musculoskeletal: She exhibits deformity. She exhibits no edema or tenderness.   Neurological: She is alert and oriented to person, place, and time.   Skin: Skin is warm and dry. She is not diaphoretic.   Psychiatric: She has a normal mood and affect.   Vitals reviewed.  Results Review:     I reviewed the patient's new clinical results and summarized them in subjective and in plan.      Glucose   Date/Time Value Ref Range Status   12/16/2018 0433 216 (H) 65 - 99 mg/dL Final     Lab Results (last 24 hours)     Procedure Component Value Units Date/Time    CBC & Differential [[id number]] Collected:  12/17/18 0548    Specimen:  Blood Updated:  12/17/18 0650    Narrative:       The following orders were created for panel order CBC & Differential.  Procedure                               Abnormality         Status                     ---------                               -----------         ------                     CBC Auto Differential[[id number]]        Abnormal            Final result                 Please view results for these tests on the individual orders.    CBC Auto Differential [[id number]]  (Abnormal) Collected:  12/17/18 0548    Specimen:  Blood Updated:  12/17/18 0650     WBC 2.79 10*3/mm3      RBC 3.92 10*6/mm3      Hemoglobin 10.3 g/dL      Hematocrit 33.2 %      MCV 84.7 fL      MCH 26.3 pg      MCHC 31.0 g/dL      RDW 16.9 %      RDW-SD 52.1 fl      MPV 10.4 fL      Platelets 109 10*3/mm3      Neutrophil % 58.5 %      Lymphocyte % 22.9 %      Monocyte % 14.3 %      Eosinophil % 3.9 %      Basophil % 0.4 %      Immature Grans % 0.4 %      Neutrophils, Absolute 1.63 10*3/mm3      Lymphocytes, Absolute 0.64 10*3/mm3      Monocytes, Absolute 0.40 10*3/mm3      Eosinophils, Absolute 0.11 10*3/mm3      Basophils, Absolute 0.01 10*3/mm3      Immature Grans, Absolute  0.01 10*3/mm3      nRBC 0.0 /100 WBC     POC Glucose Once [[id number]]  (Abnormal) Collected:  12/17/18 0559    Specimen:  Blood Updated:  12/17/18 0621     Glucose 199 mg/dL     POC Glucose Once [[id number]]  (Abnormal) Collected:  12/16/18 2108    Specimen:  Blood Updated:  12/16/18 2123     Glucose 148 mg/dL     POC Glucose Once [[id number]]  (Abnormal) Collected:  12/16/18 1641    Specimen:  Blood Updated:  12/16/18 1643     Glucose 239 mg/dL     POC Glucose Once [[id number]]  (Abnormal) Collected:  12/16/18 1122    Specimen:  Blood Updated:  12/16/18 1123     Glucose 173 mg/dL         Imaging Results (last 24 hours)     Procedure Component Value Units Date/Time    FL Esophagram Complete [[id number]] Collected:  12/16/18 1607     Updated:  12/16/18 1654    Narrative:       ESOPHAGRAM     HISTORY: Episodes of dysphagia and choking.     An initial AP view of the chest shows clear lungs and the heart is top  normal in size. The patient was evaluated in the upright position and  swallowed multiple sips of barium without difficulty. There is normal  distention of the hypopharynx and cervical esophagus. The remainder the  esophagus also appears normal with prompt emptying into the stomach and  no evidence of stricture.     The patient had some slight difficulty swallowing a barium tablet in the  oropharynx but eventually swallowed with multiple sips of water. The  barium tablet passed quickly through the length of the esophagus and  into the stomach.     CONCLUSION: Negative esophagram. No evidence of extrinsic compression or  stricture. 15 imaging sequences were obtained and the fluoroscopy time  measures 1 minute and 9 seconds.     This report was finalized on 12/16/2018 4:51 PM by Dr. Garett Hess M.D.             Medication Review: done      Current Facility-Administered Medications:   •  ALPRAZolam (XANAX) tablet 0.5 mg, 0.5 mg, Oral, BID PRN, Jose Morales MD, 0.5 mg at 12/16/18 2301  •  amitriptyline  (ELAVIL) tablet 50 mg, 50 mg, Oral, Nightly, Karlene Del Cid APRN, 50 mg at 12/16/18 2142  •  cholecalciferol (VITAMIN D3) tablet 1,000 Units, 1,000 Units, Oral, Daily, Dionisio Altman MD, 1,000 Units at 12/17/18 0926  •  cyclobenzaprine (FLEXERIL) tablet 5 mg, 5 mg, Oral, Nightly, Karlene Del Cid APRN, 5 mg at 12/16/18 2143  •  cyclobenzaprine (FLEXERIL) tablet 5 mg, 5 mg, Oral, TID PRN, Jose Almanza MD  •  dextrose (D50W) 25 g/ 50mL Intravenous Solution 25 g, 25 g, Intravenous, Q15 Min PRN, Karlene Del Cid APRN  •  dextrose (GLUTOSE) oral gel 15 g, 15 g, Oral, Q15 Min PRN, Karlene Del Cid APRN  •  DULoxetine (CYMBALTA) DR capsule 90 mg, 90 mg, Oral, Daily, Karlene Del Cid APRN, 90 mg at 12/17/18 0925  •  folic acid (FOLVITE) tablet 1 mg, 1 mg, Oral, Daily, Karlene Del Cid APRN, 1 mg at 12/17/18 0925  •  [START ON 12/18/2018] furosemide (LASIX) tablet 40 mg, 40 mg, Oral, Daily, Jose Almanza MD  •  glucagon (human recombinant) (GLUCAGEN DIAGNOSTIC) injection 1 mg, 1 mg, Subcutaneous, PRN, Karlene Del Cid APRN  •  hydrOXYzine (ATARAX) tablet 50 mg, 50 mg, Oral, TID PRN, Karlene Del Cid APRN  •  insulin glargine (LANTUS) injection 32 Units, 32 Units, Subcutaneous, Q12H, Dionisio Altman MD, 32 Units at 12/17/18 0922  •  insulin lispro (humaLOG) injection 0-24 Units, 0-24 Units, Subcutaneous, 4x Daily With Meals & Nightly, Karlene Del Cid APRN, 4 Units at 12/17/18 0925  •  insulin lispro (humaLOG) injection 12 Units, 12 Units, Subcutaneous, TID With Meals, Merary Burnett MD  •  levETIRAcetam (KEPPRA) tablet 500 mg, 500 mg, Oral, Q12H, Karlene Del Cid APRN, 500 mg at 12/17/18 0926  •  multivitamin with minerals 1 tablet, 1 tablet, Oral, Daily, Karlene Del Cid APRN, 1 tablet at 12/17/18 0926  •  ondansetron (ZOFRAN) tablet 4 mg, 4 mg, Oral, Q6H PRN, 4 mg at 12/16/18 0019 **OR** ondansetron ODT (ZOFRAN-ODT) disintegrating tablet 4 mg, 4 mg, Oral, Q6H PRN **OR** ondansetron (ZOFRAN) injection 4 mg, 4 mg,  Intravenous, Q6H PRN, Karlene Del Cid APRN, 4 mg at 12/16/18 2256  •  oxyCODONE (ROXICODONE) immediate release tablet 7.5 mg, 7.5 mg, Oral, Q8H PRN, Jose Morales MD, 7.5 mg at 12/16/18 2155  •  pantoprazole (PROTONIX) EC tablet 40 mg, 40 mg, Oral, QAM, Karlene Del Cid, APRN, 40 mg at 12/17/18 0925  •  pregabalin (LYRICA) capsule 75 mg, 75 mg, Oral, Q12H, Jose Morales MD, 75 mg at 12/17/18 0925  •  promethazine (PHENERGAN) tablet 25 mg, 25 mg, Oral, Q6H PRN, Karlene Del Cid, APRN, 25 mg at 12/16/18 1202  •  rifaximin (XIFAXAN) tablet 550 mg, 550 mg, Oral, BID, Karlene Del Cid APRN, 550 mg at 12/17/18 0926  •  sodium chloride 0.9 % flush 1-10 mL, 1-10 mL, Intravenous, PRN, Karlene Del Cid, APRN  •  [COMPLETED] Insert peripheral IV, , , Once **AND** sodium chloride 0.9 % flush 10 mL, 10 mL, Intravenous, PRN, Anisha East, APRN, 10 mL at 12/14/18 0129  •  sodium chloride 0.9 % flush 3 mL, 3 mL, Intravenous, Q12H, Karlene Del Cid APRN, 3 mL at 12/17/18 0927  •  [START ON 12/18/2018] spironolactone (ALDACTONE) tablet 100 mg, 100 mg, Oral, Daily, Jose Almanza MD  •  sucralfate (CARAFATE) tablet 1 g, 1 g, Oral, BID, Karlene Del Cid APRN, 1 g at 12/17/18 0926  •  trimethobenzamide (TIGAN) capsule 300 mg, 300 mg, Oral, TID, Karlene Del Cid APRN, 300 mg at 12/17/18 0925  •  vitamin B-12 (CYANOCOBALAMIN) tablet 1,000 mcg, 1,000 mcg, Oral, Daily, Karlene Del Cid APRN, 1,000 mcg at 12/17/18 0926    Assessment/Plan     Active Hospital Problems    Diagnosis Date Noted   • **Type 2 diabetes mellitus with hyperglycemia, with long-term current use of insulin (CMS/[facility]) [E11.65, Z79.4] 12/13/2018   • Vitamin D insufficiency [E55.9] 12/16/2018   • BRICE (obstructive sleep apnea) [G47.33] 12/13/2018   • Gastroparesis [K31.84] 12/13/2018   • Hyperammonemia (CMS/[facility]) [E72.20] 12/13/2018   • Hyponatremia [E87.1] 12/13/2018   • Anemia [D64.9] 12/13/2018   • Thrombocytopenia (CMS/[facility]) [D69.6] 05/16/2018   • Hyperlipidemia  "[E78.5]    • DUKES (nonalcoholic steatohepatitis) [K75.81] 06/14/2016   • Cirrhosis of liver (CMS/HCC) [K74.60] 03/08/2016   • Rheumatoid arthritis (CMS/HCC) [M06.9] 03/08/2016   • Anxiety and depression [F41.9, F32.9] 03/08/2016      Resolved Hospital Problems   No resolved problems to display.     Type 2 diabetes mellitus-uncontrolled, complicated with gastroparesis  Continue Lantus 32 units twice daily  Continue Humalog 10 units with each meal  Will increase the dosage of Humalog when her diet is advanced  Continue Humalog sliding scale 3 times a day before meals and at bedtime.    Gastroparesis  GI on board.  Patient's glycemic control is extremely important in order to prevent her gastroparesis flares up.  Not a candidate for gastric pacemaker due to her ITP.  Currently on clear liquid diet.     Discharge instructions -   Follow up in 2 weeks.   Pt will dced on the current insulin regimen.  She needs to take 1/2 the dose of humalog tid if her BG is greater than 150, even if not eating.   Continue high dose ssi tid only.     Discussed plan with Nurse.     Merary Burnett MD.  12/17/18  10:32 AM      EMR Dragon / transcription disclaimer:    \"Dictated utilizing Dragon dictation\".   "

## 2018-12-17 NOTE — PLAN OF CARE
Problem: Patient Care Overview  Goal: Plan of Care Review  Outcome: Ongoing (interventions implemented as appropriate)   12/17/18 4308   Coping/Psychosocial   Plan of Care Reviewed With patient   OTHER   Outcome Summary No falls. Sling intact to LUE with ace wrap in place to limit movement of LUE. No c/o numbness or tingling, fingers warm and mobile. Good pulses present. No signs of hyper/hypoglycemia. Pain controlled with prn Oxycodone. Cont to monitor.   Plan of Care Review   Progress improving     Goal: Discharge Needs Assessment  Outcome: Ongoing (interventions implemented as appropriate)    Goal: Interprofessional Rounds/Family Conf  Outcome: Ongoing (interventions implemented as appropriate)      Problem: Fall Risk (Adult)  Goal: Absence of Fall  Outcome: Ongoing (interventions implemented as appropriate)      Problem: Skin Injury Risk (Adult)  Goal: Skin Health and Integrity  Outcome: Ongoing (interventions implemented as appropriate)      Problem: Hyperglycemia, Persistent (Adult)  Goal: Signs and Symptoms of Listed Potential Problems Will be Absent, Minimized or Managed (Hyperglycemia, Persistent)  Outcome: Ongoing (interventions implemented as appropriate)

## 2018-12-18 VITALS
HEIGHT: 66 IN | TEMPERATURE: 98.2 F | DIASTOLIC BLOOD PRESSURE: 67 MMHG | HEART RATE: 101 BPM | WEIGHT: 188.27 LBS | BODY MASS INDEX: 30.26 KG/M2 | OXYGEN SATURATION: 97 % | RESPIRATION RATE: 18 BRPM | SYSTOLIC BLOOD PRESSURE: 112 MMHG

## 2018-12-18 LAB
GLUCOSE BLDC GLUCOMTR-MCNC: 196 MG/DL (ref 70–130)
GLUCOSE BLDC GLUCOMTR-MCNC: 369 MG/DL (ref 70–130)
GLUCOSE BLDC GLUCOMTR-MCNC: 77 MG/DL (ref 70–130)
INR PPP: 1.29 (ref 0.9–1.1)
PROTHROMBIN TIME: 15.8 SECONDS (ref 11.7–14.2)

## 2018-12-18 PROCEDURE — 63710000001 PROMETHAZINE PER 25 MG: Performed by: NURSE PRACTITIONER

## 2018-12-18 PROCEDURE — 63710000001 INSULIN GLARGINE PER 5 UNITS: Performed by: INTERNAL MEDICINE

## 2018-12-18 PROCEDURE — 82962 GLUCOSE BLOOD TEST: CPT

## 2018-12-18 PROCEDURE — 63710000001 INSULIN LISPRO (HUMAN) PER 5 UNITS: Performed by: NURSE PRACTITIONER

## 2018-12-18 PROCEDURE — 85610 PROTHROMBIN TIME: CPT | Performed by: HOSPITALIST

## 2018-12-18 PROCEDURE — 99213 OFFICE O/P EST LOW 20 MIN: CPT | Performed by: INTERNAL MEDICINE

## 2018-12-18 PROCEDURE — G0378 HOSPITAL OBSERVATION PER HR: HCPCS

## 2018-12-18 PROCEDURE — 97110 THERAPEUTIC EXERCISES: CPT

## 2018-12-18 PROCEDURE — 63710000001 INSULIN LISPRO (HUMAN) PER 5 UNITS: Performed by: INTERNAL MEDICINE

## 2018-12-18 RX ORDER — LEVETIRACETAM 500 MG/1
500 TABLET ORAL 2 TIMES DAILY
Status: DISCONTINUED | OUTPATIENT
Start: 2018-12-18 | End: 2018-12-18 | Stop reason: SDUPTHER

## 2018-12-18 RX ORDER — AMITRIPTYLINE HYDROCHLORIDE 50 MG/1
50 TABLET, FILM COATED ORAL NIGHTLY
Qty: 30 TABLET | Refills: 0 | Status: SHIPPED | OUTPATIENT
Start: 2018-12-18 | End: 2019-04-30 | Stop reason: SDUPTHER

## 2018-12-18 RX ADMIN — INSULIN LISPRO 20 UNITS: 100 INJECTION, SOLUTION INTRAVENOUS; SUBCUTANEOUS at 12:16

## 2018-12-18 RX ADMIN — MULTIPLE VITAMINS W/ MINERALS TAB 1 TABLET: TAB at 09:20

## 2018-12-18 RX ADMIN — TRIMETHOBENZAMIDE HYDROCHLORIDE 300 MG: 300 CAPSULE ORAL at 09:19

## 2018-12-18 RX ADMIN — PROMETHAZINE HYDROCHLORIDE 25 MG: 25 TABLET ORAL at 16:20

## 2018-12-18 RX ADMIN — PROMETHAZINE HYDROCHLORIDE 25 MG: 25 TABLET ORAL at 08:17

## 2018-12-18 RX ADMIN — PANTOPRAZOLE SODIUM 40 MG: 40 TABLET, DELAYED RELEASE ORAL at 09:19

## 2018-12-18 RX ADMIN — SPIRONOLACTONE 100 MG: 100 TABLET ORAL at 09:20

## 2018-12-18 RX ADMIN — INSULIN LISPRO 10 UNITS: 100 INJECTION, SOLUTION INTRAVENOUS; SUBCUTANEOUS at 09:17

## 2018-12-18 RX ADMIN — OXYCODONE HYDROCHLORIDE 7.5 MG: 15 TABLET ORAL at 08:17

## 2018-12-18 RX ADMIN — LEVETIRACETAM 500 MG: 500 TABLET, FILM COATED ORAL at 09:20

## 2018-12-18 RX ADMIN — DULOXETINE HYDROCHLORIDE 90 MG: 60 CAPSULE, DELAYED RELEASE ORAL at 09:20

## 2018-12-18 RX ADMIN — PREGABALIN 75 MG: 25 CAPSULE ORAL at 09:19

## 2018-12-18 RX ADMIN — INSULIN GLARGINE 32 UNITS: 100 INJECTION, SOLUTION SUBCUTANEOUS at 09:17

## 2018-12-18 RX ADMIN — FUROSEMIDE 40 MG: 40 TABLET ORAL at 09:19

## 2018-12-18 RX ADMIN — Medication 1000 MCG: at 09:19

## 2018-12-18 RX ADMIN — VITAMIN D, TAB 1000IU (100/BT) 1000 UNITS: 25 TAB at 09:19

## 2018-12-18 RX ADMIN — SODIUM CHLORIDE, PRESERVATIVE FREE 3 ML: 5 INJECTION INTRAVENOUS at 09:19

## 2018-12-18 RX ADMIN — FOLIC ACID 1 MG: 1 TABLET ORAL at 09:20

## 2018-12-18 RX ADMIN — RIFAXIMIN 550 MG: 550 TABLET ORAL at 09:20

## 2018-12-18 RX ADMIN — OXYCODONE HYDROCHLORIDE 7.5 MG: 15 TABLET ORAL at 16:20

## 2018-12-18 RX ADMIN — INSULIN LISPRO 4 UNITS: 100 INJECTION, SOLUTION INTRAVENOUS; SUBCUTANEOUS at 09:18

## 2018-12-18 RX ADMIN — SUCRALFATE 1 G: 1 TABLET ORAL at 09:19

## 2018-12-18 RX ADMIN — INSULIN LISPRO 10 UNITS: 100 INJECTION, SOLUTION INTRAVENOUS; SUBCUTANEOUS at 12:16

## 2018-12-18 NOTE — PROGRESS NOTES
Continued Stay Note  [facility]     Patient Name: Silvia Zabala  MRN: [id number]  Today's Date: 12/18/2018    Admit Date: 12/13/2018    Discharge Plan     Row Name 12/18/18 1446       Plan    Plan  Home with spouse and mother in law     Plan Comments  Was unable to reach to Luis Masterson, no answer after calling several times and no call back after leaving messages. Pt is agreeable for CCP to call [facility] pharmacy and she will pick it up there. Called [facility] pharmacy and spoke with Teena, pt has a $0 copay for ther Lantus, Humalog and needles. Pt updated at bedside. Dr Almanza updated. JChastreinaldoRN/CCP           Discharge Codes    No documentation.       Expected Discharge Date and Time     Expected Discharge Date Expected Discharge Time    Dec 18, 2018             Sujata Velasquez RN

## 2018-12-18 NOTE — PROGRESS NOTES
58 y.o.   LOS: 0 days   Patient Care Team:  Blanca Pitts MD as PCP - General (Internal Medicine)  Blanca Pitts MD as PCP - Claims Attributed  Sukhdeep Mcdowell MD as Consulting Physician (Hematology and Oncology)  Blanca Pitts MD as Referring Physician (Internal Medicine)  Rich Coleman MD as Consulting Physician (Gastroenterology)  Merary Burnett MD as Consulting Physician (Endocrinology)    Chief Complaint:  Elevated blood sugars    Chief Complaint   Patient presents with   • Hyperglycemia       Subjective   Patient's diet has been advanced to GI soft- consistent carbohydrate.  Blood sugars are noted to be elevated at around lunch time.-300 mg/dL range.    Interval History:    Review of Systems:   Review of Systems   Constitutional: Positive for appetite change and fatigue. Negative for fever.   Eyes: Negative for visual disturbance.   Respiratory: Negative for shortness of breath.    Cardiovascular: Negative for palpitations and leg swelling.   Gastrointestinal: Negative for abdominal pain and vomiting.   Endocrine: Negative for polydipsia and polyuria.   Musculoskeletal: Negative for joint swelling and neck pain.   Skin: Negative for rash.   Neurological: Positive for weakness. Negative for numbness.   Psychiatric/Behavioral: Negative for behavioral problems.     Objective     Vital Signs   Temp:  [97.6 °F (36.4 °C)-97.9 °F (36.6 °C)] 97.7 °F (36.5 °C)  Heart Rate:  [] 99  Resp:  [16-20] 20  BP: (109-114)/(63-71) 114/69    Physical Exam:  Physical Exam   Constitutional: She is oriented to person, place, and time. She appears well-nourished.   Obese     HENT:   Head: Normocephalic and atraumatic.   Wide neck   Eyes: Conjunctivae and EOM are normal. No scleral icterus.   Neck: Normal range of motion. Neck supple. No thyromegaly present.   Acanthosis nigricans   Cardiovascular: Normal rate and normal heart sounds.   Pulmonary/Chest: Effort normal and breath sounds normal. No stridor. She has no  wheezes.   Abdominal: Soft. Bowel sounds are normal. She exhibits distension. There is no tenderness.   Central obesity   Musculoskeletal: She exhibits no edema or tenderness.   Neurological: She is alert and oriented to person, place, and time.   Skin: Skin is warm and dry. She is not diaphoretic.   Psychiatric: She has a normal mood and affect.   Vitals reviewed.  Results Review:     I reviewed the patient's new clinical results and summarized them in subjective and in plan.      Glucose   Date/Time Value Ref Range Status   12/16/2018 0433 216 (H) 65 - 99 mg/dL Final     Lab Results (last 24 hours)     Procedure Component Value Units Date/Time    POC Glucose Once [[id number]]  (Abnormal) Collected:  12/18/18 1059    Specimen:  Blood Updated:  12/18/18 1100     Glucose 369 mg/dL     Protime-INR [[id number]]  (Abnormal) Collected:  12/18/18 0627    Specimen:  Blood Updated:  12/18/18 0743     Protime 15.8 Seconds      INR 1.29    POC Glucose Once [[id number]]  (Abnormal) Collected:  12/18/18 0604    Specimen:  Blood Updated:  12/18/18 0605     Glucose 196 mg/dL     POC Glucose Once [[id number]]  (Abnormal) Collected:  12/17/18 2109    Specimen:  Blood Updated:  12/17/18 2120     Glucose 203 mg/dL     POC Glucose Once [[id number]]  (Normal) Collected:  12/17/18 1638    Specimen:  Blood Updated:  12/17/18 1640     Glucose 110 mg/dL     C-Peptide [[id number]]  (Abnormal) Collected:  12/16/18 0433    Specimen:  Blood Updated:  12/17/18 1309     C-Peptide 1.0 ng/mL      Comment: C-Peptide reference interval is for fasting patients.       Narrative:       Performed at:  [address] [place] [address]  [id number]  : Quan Aguilar PhD, Phone:  [phone]        Imaging Results (last 24 hours)     ** No results found for the last 24 hours. **          Medication Review: done      Current Facility-Administered Medications:   •  ALPRAZolam (XANAX) tablet 0.5 mg, 0.5 mg, Oral, BID PRN, Andrew  Jose GAITAN MD, 0.5 mg at 12/16/18 2301  •  amitriptyline (ELAVIL) tablet 50 mg, 50 mg, Oral, Nightly, Karlene Del Cid APRN, 50 mg at 12/17/18 2110  •  cholecalciferol (VITAMIN D3) tablet 1,000 Units, 1,000 Units, Oral, Daily, Dionisio Altman MD, 1,000 Units at 12/18/18 0919  •  cyclobenzaprine (FLEXERIL) tablet 5 mg, 5 mg, Oral, Nightly, Karlene Del Cid APRN, 5 mg at 12/17/18 2111  •  cyclobenzaprine (FLEXERIL) tablet 5 mg, 5 mg, Oral, TID PRN, Jose Almanza MD  •  dextrose (D50W) 25 g/ 50mL Intravenous Solution 25 g, 25 g, Intravenous, Q15 Min PRN, Karlene Del Cid APRN  •  dextrose (GLUTOSE) oral gel 15 g, 15 g, Oral, Q15 Min PRN, Karlene Del Cid APRN  •  DULoxetine (CYMBALTA) DR capsule 90 mg, 90 mg, Oral, Daily, Karlene Del Cid APRN, 90 mg at 12/18/18 0920  •  folic acid (FOLVITE) tablet 1 mg, 1 mg, Oral, Daily, Karlene Del Cid APRN, 1 mg at 12/18/18 0920  •  furosemide (LASIX) tablet 40 mg, 40 mg, Oral, Daily, Jose Almanza MD, 40 mg at 12/18/18 0919  •  glucagon (human recombinant) (GLUCAGEN DIAGNOSTIC) injection 1 mg, 1 mg, Subcutaneous, PRN, Karlene Del Cid, APRN  •  hydrOXYzine (ATARAX) tablet 50 mg, 50 mg, Oral, TID PRN, Karlene Del Cid APRN  •  insulin glargine (LANTUS) injection 32 Units, 32 Units, Subcutaneous, Q12H, Dionisio Altman MD, 32 Units at 12/18/18 0917  •  insulin lispro (humaLOG) injection 0-24 Units, 0-24 Units, Subcutaneous, 4x Daily With Meals & Nightly, Karlene Del Cid APRN, 20 Units at 12/18/18 1216  •  insulin lispro (humaLOG) injection 16 Units, 16 Units, Subcutaneous, TID With Meals, Merary Burnett MD  •  levETIRAcetam (KEPPRA) tablet 500 mg, 500 mg, Oral, Q12H, Karlene Del Cid APRN, 500 mg at 12/18/18 0920  •  multivitamin with minerals 1 tablet, 1 tablet, Oral, Daily, Karlene Del Cid APRN, 1 tablet at 12/18/18 0920  •  ondansetron (ZOFRAN) tablet 4 mg, 4 mg, Oral, Q6H PRN, 4 mg at 12/16/18 0019 **OR** ondansetron ODT (ZOFRAN-ODT) disintegrating tablet 4 mg, 4 mg,  Oral, Q6H PRN **OR** ondansetron (ZOFRAN) injection 4 mg, 4 mg, Intravenous, Q6H PRN, Karlene Del Cid, APRN, 4 mg at 12/16/18 2256  •  oxyCODONE (ROXICODONE) immediate release tablet 7.5 mg, 7.5 mg, Oral, Q8H PRN, Jose Morales MD, 7.5 mg at 12/18/18 0817  •  pantoprazole (PROTONIX) EC tablet 40 mg, 40 mg, Oral, QAM, Karlene Del Cid, APRN, 40 mg at 12/18/18 0919  •  pregabalin (LYRICA) capsule 75 mg, 75 mg, Oral, Q12H, Jose Morales MD, 75 mg at 12/18/18 0919  •  promethazine (PHENERGAN) tablet 25 mg, 25 mg, Oral, Q6H PRN, Karlene Del Cid, APRN, 25 mg at 12/18/18 0817  •  rifaximin (XIFAXAN) tablet 550 mg, 550 mg, Oral, BID, Karlene Del Cid, APRN, 550 mg at 12/18/18 0920  •  sodium chloride 0.9 % flush 1-10 mL, 1-10 mL, Intravenous, PRN, Karlene Del Cid E, APRN  •  [COMPLETED] Insert peripheral IV, , , Once **AND** sodium chloride 0.9 % flush 10 mL, 10 mL, Intravenous, PRN, Anisha East, APRN, 10 mL at 12/14/18 0129  •  sodium chloride 0.9 % flush 3 mL, 3 mL, Intravenous, Q12H, Karlene Del Cid, APRN, 3 mL at 12/18/18 0919  •  spironolactone (ALDACTONE) tablet 100 mg, 100 mg, Oral, Daily, Jose Almanza MD, 100 mg at 12/18/18 0920  •  sucralfate (CARAFATE) tablet 1 g, 1 g, Oral, BID, Karlene Del Cid E, APRN, 1 g at 12/18/18 0919  •  trimethobenzamide (TIGAN) capsule 300 mg, 300 mg, Oral, TID, Karlene Del Cid, APRN, 300 mg at 12/18/18 0919  •  vitamin B-12 (CYANOCOBALAMIN) tablet 1,000 mcg, 1,000 mcg, Oral, Daily, Karlene Del Cid, APRN, 1,000 mcg at 12/18/18 0919    Assessment/Plan     Active Hospital Problems    Diagnosis Date Noted   • **Type 2 diabetes mellitus with hyperglycemia, with long-term current use of insulin (CMS/[facility]) [E11.65, Z79.4] 12/13/2018   • Vitamin D insufficiency [E55.9] 12/16/2018   • BRICE (obstructive sleep apnea) [G47.33] 12/13/2018   • Gastroparesis [K31.84] 12/13/2018   • Hyperammonemia (CMS/[facility]) [E72.20] 12/13/2018   • Hyponatremia [E87.1] 12/13/2018   • Anemia [D64.9] 12/13/2018  "  • Thrombocytopenia (CMS/HCC) [D69.6] 05/16/2018   • Hyperlipidemia [E78.5]    • DUKES (nonalcoholic steatohepatitis) [K75.81] 06/14/2016   • Cirrhosis of liver (CMS/HCC) [K74.60] 03/08/2016   • Rheumatoid arthritis (CMS/HCC) [M06.9] 03/08/2016   • Anxiety and depression [F41.9, F32.9] 03/08/2016      Resolved Hospital Problems   No resolved problems to display.     Type 2 diabetes mellitus-uncontrolled, complicated with gastroparesis  Continue Lantus 32 units twice daily  Increase Humalog to 16 units with each meal.  Also placed holding parameters for Humalog to give half the dose if her blood sugar is less than 100.  Continue Humalog sliding scale 3 times a day before meals and at bedtime.    Gastroparesis  GI on board.  Patient's glycemic control is extremely important in order to prevent her gastroparesis flares up.  Not a candidate for gastric pacemaker due to her ITP.  Currently on clear liquid diet.     Discharge instructions -   Follow up in 2 weeks.   Pt will dced on the current insulin regimen.  She needs to take 1/2 the dose of humalog tid if her BG is greater than 150, even if not eating.   Continue high dose ssi tid only.     Discussed plan with Nurse.     Merary Burnett MD.  12/18/18  10:32 AM      EMR Dragon / transcription disclaimer:    \"Dictated utilizing Dragon dictation\".   "

## 2018-12-18 NOTE — THERAPY TREATMENT NOTE
Acute Care - Physical Therapy Treatment Note  [facility]     Patient Name: Silvia Zabala  : 1960  MRN: [id number]  Today's Date: 2018  Onset of Illness/Injury or Date of Surgery: 18          Admit Date: 2018    Visit Dx:    ICD-10-CM ICD-9-CM   1. Type 2 diabetes mellitus with hyperglycemia, with long-term current use of insulin (CMS/HCC) E11.65 250.00    Z79.4 790.29     V58.67   2. Anasarca R60.1 782.3   3. DUKES (nonalcoholic steatohepatitis) K75.81 571.8   4. Depression, unspecified depression type F32.9 311   5. Closed 2-part displaced fracture of surgical neck of left humerus, initial encounter, non acute S42.222A 812.01     Patient Active Problem List   Diagnosis   • Cirrhosis of liver (CMS/HCC)   • Rheumatoid arthritis (CMS/HCC)   • Anxiety and depression   • Type 2 diabetes mellitus, uncontrolled, with neuropathy (CMS/HCC)   • Fibrositis   • Change in blood platelet count   • Pancytopenia (CMS/HCC)   • Hypersplenism   • Nausea   • DUKES (nonalcoholic steatohepatitis)   • Hyperlipidemia   • Abdominal pain   • Hematemesis   • Ascites   • Portal hypertension (CMS/HCC)   • Systemic lupus (CMS/HCC)   • Secondary esophageal varices without bleeding (CMS/HCC)   • Gastroparesis   • Cirrhosis of liver with ascites (CMS/HCC)   • Diabetic ketoacidosis without coma associated with type 2 diabetes mellitus (CMS/HCC)   • Diabetic peripheral neuropathy (CMS/HCC)   • History of seizure disorder   • Hepatic encephalopathy (CMS/HCC)   • Abnormal finding of blood chemistry    • Thrombocytopenia (CMS/HCC)   • Fever   • Acute ITP (CMS/HCC)   • Degeneration of lumbosacral intervertebral disc   • Seizure (CMS/HCC)   • Spondylosis without myelopathy   • Intractable vomiting with nausea   • UGI bleed   • Migraine without aura   • Urinary retention   • Type 2 diabetes mellitus with hyperglycemia, with long-term current use of insulin (CMS/HCC)   • BRICE (obstructive sleep apnea)   • Gastroparesis   •  Hyperammonemia (CMS/HCC)   • Hyponatremia   • Anemia   • Vitamin D insufficiency       Therapy Treatment    Rehabilitation Treatment Summary     Row Name 12/18/18 0945             Treatment Time/Intention    Discipline  physical therapy assistant  -      Document Type  therapy note (daily note)  -      Subjective Information  complains of;weakness;nausea/vomiting headache  -      Mode of Treatment  physical therapy  -      Patient/Family Observations  Pt supine in bed  -      Care Plan Review  patient/other agree to care plan  -      Therapy Frequency (PT Clinical Impression)  5 times/wk  -      Patient Effort  good  -EH      Existing Precautions/Restrictions  fall NWB LUE, sling/swath on  -EH      Recorded by [] Maia Reyna, PTA 12/18/18 1035      Row Name 12/18/18 0945             Cognitive Assessment/Intervention- PT/OT    Orientation Status (Cognition)  oriented to;oriented x 3  -EH      Follows Commands (Cognition)  WFL  -      Personal Safety Interventions  fall prevention program maintained;gait belt;nonskid shoes/slippers when out of bed  -EH      Recorded by [] Maia Reyna, [place] 12/18/18 1035      Row Name 12/18/18 0945             Bed Mobility Assessment/Treatment    Supine-Sit [place] (Bed Mobility)  supervision  -      Sit-Supine [place] (Bed Mobility)  supervision  -      Assistive Device (Bed Mobility)  head of bed elevated  -      Recorded by [] Maia Reyna, PTA 12/18/18 1035      Row Name 12/18/18 0945             Sit-Stand Transfer    Sit-Stand [place] (Transfers)  stand by assist  -      Assistive Device (Sit-Stand Transfers)  -- No AD  -EH      Recorded by [] Maia Reyna PTA 12/18/18 1035      Row Name 12/18/18 0945             Stand-Sit Transfer    Stand-Sit [place] (Transfers)  stand by assist  -      Assistive Device (Stand-Sit Transfers)  -- No AD  -EH      Recorded by [] Maia Reyna PTA 12/18/18 1035      Row Name 12/18/18  0945             Gait/Stairs Assessment/Training    [place] Level (Gait)  contact guard;2 person assist  -EH      Assistive Device (Gait)  -- No AD  -EH      Distance in Feet (Gait)  150  -EH      Pattern (Gait)  swing-through  -EH      Deviations/Abnormal Patterns (Gait)  gait speed decreased;festinating/shuffling  -EH      Bilateral Gait Deviations  heel strike decreased  -      Comment (Gait/Stairs)  Pt a little unsteady still but no overt LOB today.  -EH      Recorded by [] Maia Reyna, \[facility]\"" 12/18/18 1035      Row Name 12/18/18 0945             Positioning and Restraints    Pre-Treatment Position  in bed  -EH      Post Treatment Position  bed  -EH      In Bed  supine;call light within reach;encouraged to call for assist;exit alarm on  -      Recorded by [] Maia Reyna, \[facility] [place] [facility]\"" 12/18/18 1035        User Key  (r) = Recorded By, (t) = Taken By, (c) = Cosigned By    Initials Name Effective Dates Discipline     Maia Reyna, \[facility]\"" 08/19/18 -  PT                   Physical Therapy Education     Title: PT OT SLP Therapies (Done)     Topic: Physical Therapy (Done)     Point: Mobility training (Done)     Learning Progress Summary           Patient Acceptance, E, VU,NR by  at 12/18/2018 10:30 AM    Acceptance, E,TB,D, VU,NR by  at 12/17/2018 10:24 AM    Acceptance, E, NR by AR at 12/15/2018  3:47 PM                   Point: Home exercise program (Done)     Learning Progress Summary           Patient Acceptance, E, VU,NR by  at 12/18/2018 10:30 AM    Acceptance, E,TB,D, VU,NR by  at 12/17/2018 10:24 AM    Acceptance, E, NR by AR at 12/15/2018  3:47 PM                   Point: Body mechanics (Done)     Learning Progress Summary           Patient Acceptance, E, VU,NR by  at 12/18/2018 10:30 AM    Acceptance, E,TB,D, VU,NR by  at 12/17/2018 10:24 AM    Acceptance, E, NR by AR at 12/15/2018  3:47 PM                   Point: Precautions (Done)     Learning Progress Summary           Patient Acceptance,  E, VU,NR by  at 12/18/2018 10:30 AM    Acceptance, E,TB,D, VU,NR by  at 12/17/2018 10:24 AM    Acceptance, E, NR by AR at 12/15/2018  3:47 PM                               User Key     Initials Effective Dates Name Provider Type Discipline    AR 04/03/18 -  Terri Garay, PT Physical Therapist PT     03/07/18 -  Sheila Mccormick PTA Physical Therapy Assistant PT     08/19/18 -  Maia Reyna PTA Physical Therapy Assistant PT                PT Recommendation and Plan  Therapy Frequency (PT Clinical Impression): 5 times/wk  Plan of Care Reviewed With: patient  Progress: improving  Outcome Summary: Pt tolereated treatment with min-moderate c/o a headache, nausea, and weakness. Pt ambulated 150 feet with no AD requiring CGAX2. Pt is still a little unsteady during ambulation but no overt LOB. Pt improving with bed mobility and transfers requiring supervision.   Outcome Measures     Row Name 12/18/18 1000 12/17/18 1000 12/15/18 1500       How much help from another person do you currently need...    Turning from your back to your side while in flat bed without using bedrails?  4  -  4  -  3  -AR    Moving from lying on back to sitting on the side of a flat bed without bedrails?  4  -  4  -  3  -AR    Moving to and from a bed to a chair (including a wheelchair)?  4  -  3  -SM  3  -AR    Standing up from a chair using your arms (e.g., wheelchair, bedside chair)?  3  -  3  -  3  -AR    Climbing 3-5 steps with a railing?  3  -  2  -  2  -AR    To walk in hospital room?  3  -  3  -SM  3  -AR    AM-PAC 6 Clicks Score  21  -  19  -  17  -AR       Functional Assessment    Outcome Measure Options  --  AM-PAC 6 Clicks Basic Mobility (PT)  -  --      User Key  (r) = Recorded By, (t) = Taken By, (c) = Cosigned By    Initials Name Provider Type    AR Terri Garay, PT Physical Therapist     Sheila Mccormick PTA Physical Therapy Assistant     Maia Reyna PTA Physical Therapy  Assistant         Time Calculation:   PT Charges     Row Name 12/18/18 1029             Time Calculation    Start Time  0945  -      Stop Time  0953  -      Time Calculation (min)  8 min  -      PT Received On  12/18/18  -      PT - Next Appointment  12/19/18  -         Time Calculation- PT    Total Timed Code Minutes- PT  8 minute(s)  -        User Key  (r) = Recorded By, (t) = Taken By, (c) = Cosigned By    Initials Name Provider Type     Maia Reyna PTA Physical Therapy Assistant        Therapy Suggested Charges     Code   Minutes Charges    None           Therapy Charges for Today     Code Description Service Date Service Provider Modifiers Qty    06187089070 HC PT THER PROC EA 15 MIN 12/18/2018 Maia Reyna PTA GP 1    42889412654 HC PT THER SUPP EA 15 MIN 12/18/2018 Maia Reyna PTA GP 1          PT G-Codes  Outcome Measure Options: AM-PAC 6 Clicks Basic Mobility (PT)  AM-PAC 6 Clicks Score: 21  Functional Limitation: Mobility: Walking and moving around  Mobility: Walking and Moving Around Current Status (): At least 20 percent but less than 40 percent impaired, limited or restricted  Mobility: Walking and Moving Around Goal Status (): At least 1 percent but less than 20 percent impaired, limited or restricted    Maia Reyna PTA  12/18/2018

## 2018-12-18 NOTE — PLAN OF CARE
Problem: Patient Care Overview  Goal: Plan of Care Review  Outcome: Ongoing (interventions implemented as appropriate)   12/18/18 1915   Coping/Psychosocial   Plan of Care Reviewed With patient   OTHER   Outcome Summary Has slept long intervals. Vss. TAMY sling and ace intact. No s/s hyper/hypoglycemia. VSS. c/o some choking with dinner last pm. Diet changed to soft texture instead of reg. Cont to monitor. No other changes. No distress   Plan of Care Review   Progress improving       Problem: Fall Risk (Adult)  Goal: Absence of Fall  Outcome: Ongoing (interventions implemented as appropriate)      Problem: Skin Injury Risk (Adult)  Goal: Skin Health and Integrity  Outcome: Ongoing (interventions implemented as appropriate)      Problem: Hyperglycemia, Persistent (Adult)  Goal: Signs and Symptoms of Listed Potential Problems Will be Absent, Minimized or Managed (Hyperglycemia, Persistent)  Outcome: Ongoing (interventions implemented as appropriate)

## 2018-12-18 NOTE — DISCHARGE SUMMARY
[facility]  [phone]    DISCHARGE SUMMARY  Psychiatric    Patient Identification:  Name: Silvia Zabala  Age: 58 y.o.  Sex: female  :  1960  MRN: [id number]  Primary Care Physician: Blanca Pitts MD    Admit date: 2018  Discharge date and time:      Discharge Diagnoses:  Type 2 diabetes mellitus with hyperglycemia, with long-term current use of insulin (CMS/HCC)    Thrombocytopenia (CMS/HCC)    Cirrhosis of liver (CMS/HCC)    Rheumatoid arthritis (CMS/HCC)    Anxiety and depression    DUKES (nonalcoholic steatohepatitis)    Hyperlipidemia    BRICE (obstructive sleep apnea)    Gastroparesis    Hyperammonemia (CMS/HCC)    Hyponatremia    Anemia    Vitamin D insufficiency       History of present illness from H&P:    Ms. Zabala is a 58 y.o. female with a history of DUKES, cirrhosis, depression, DM1, seizure, BRICE, RA, SLE, HLD, gastroparesis that presents to [facility] complaining of elevated blood glucose and nausea. Patient states that she has been compliant with insulin and other meds, though despite this and recently being seen by endocrinologist, her blood sugars have remained high. Today she states that her blood glucose monitor wouldn't read her glucose because it was too high, though she reports that this has been ongoing for several months. She also reports nausea without vomiting, but this is also chronic as she does have gastroparesis and was taken off Reglan due to tardive dyskinesia. She additionally reports pain to her left shoulder after fall 4 weeks ago and broken humerus, no surgical intervention warranted. She reports that she does have appointment with orthopedist in January and has been wearing a sling since her injury. She reports decreased appetite, chills, fever, congestion, cough, intermittent shortness of breath and chest pains, abdominal pain, nausea, vomiting, gait instability, incomplete emptying of bladder,  back pain, headache, lower extremity swelling and weakness. None of these symptoms are new and she denies any acute changes that actually brought her in to the hospital tonight. She additionaly reports that she was taken off of her Wellbutrin and since that time admits to having suicidal thoughts, though denies plan and states that she wouldn't actually harm herself because she loves her , grandchildren and father. Of note, she does report that while in [place] about a month ago, she ran out of insulin and was without for about a week, however, blood sugars had been out of control prior to that time.      Hospital Course:     The patient was admitted to the hospital.  With extremely high blood sugars.  Blood sugars were controlled.  The patient was previously on U500 insulin and has been changed to Lantus and short-acting insulin.  The patient feels comfortable with current regimen of insulin.    During hospitalization patient is had problems related to her gastroparesis.  She has had vomiting which is not unusual for the patient.  Patient is tolerating by mouth over the last couple days.  She does state that some foods have a tendency to get stuck in her throat.  I did ask gastroenterology to see her about this we did an esophagram which was unremarkable.    Today the patient is complaining of a headache.  His says that the headaches as started in the last few months.  She had headaches during the prior hospitalization.  During that hospitalization she was seen by neurology.  It appears the patient was in the emergency room 11/19/18 for left-sided headaches.  Patient had a head CT at that time which was unremarkable.  Patient is on chronic pain medication.  The patient usually takes her pain medication a couple times per day.  She had pain medication earlier today.  We will give the patient her chronic pain medication to see if this helps with her symptoms prior to discharge.      Further hospital course by  problem list:    Choking spells- esophagram unremarkable, gi has seen     Type 2 diabetes mellitus with hyperglycemia, with long-term current use of insulin (CMS/HCC)  -Blood sugars are much better controlled compared to admission.  -Endocrinology is following  -at home she is on  65 with tid, 30 with snacks; on discharge will leave on Lantus and a sliding scale insulin with meals.  Patient is to take at least half the insulin sliding scale insulin even if she is not eating assuming blood glucose >150.       Thrombocytopenia (CMS/HCC)  -platelets are stable       Cirrhosis of liver (CMS/HCC)  -LFT stable  -ammonia elevated on admission       Rheumatoid arthritis (CMS/HCC)  -she was on embrel and last dose was in May  -She sees Dr Munoz       Anxiety and depression       DUKES (nonalcoholic steatohepatitis) /  Hyperammonemia (CMS/HCC)  -lasix and spironolactone  -xifaxan       Hyperlipidemia       BRICE (obstructive sleep apnea)       Gastroparesis  -tigan for nausea       Hyponatremia     Chronic pain medication  -lyrica, flexiri, cymbalta, elavil       Anemia  -Hemoglobin level is slightly lower than baseline about 10     Seizure-keppra, no seizure in a long time, probably 15 years     dvt prophylaxis was with scd, no lovenox given liver diseae    Consults:   Consults     Date and Time Order Name Status Description    12/14/2018 0115 Inpatient Orthopedic Surgery Consult Completed     12/14/2018 0115 Inpatient Gastroenterology Consult Completed     12/14/2018 0108 Inpatient Endocrinology Consult      12/13/2018 2043 [facility] (on-call MD unless specified) Completed           Results from last 7 days   Lab Units  12/17/18   0548   WBC 10*3/mm3  2.79*   HEMOGLOBIN g/dL  10.3*   HEMATOCRIT %  33.2*   PLATELETS 10*3/mm3  109*       Results from last 7 days   Lab Units  12/16/18   0433   SODIUM mmol/L  138   POTASSIUM mmol/L  4.0   CHLORIDE mmol/L  99   CO2 mmol/L  26.4   BUN mg/dL  11   CREATININE mg/dL  0.81   GLUCOSE  mg/dL  216*   CALCIUM mg/dL  8.9       Significant Diagnostic Studies:   Lab Results   Component Value Date    WBC 2.79 (L) 12/17/2018    HGB 10.3 (L) 12/17/2018    HCT 33.2 (L) 12/17/2018     (L) 12/17/2018     Lab Results   Component Value Date     12/16/2018    K 4.0 12/16/2018    CL 99 12/16/2018    CO2 26.4 12/16/2018    BUN 11 12/16/2018    CREATININE 0.81 12/16/2018    GLUCOSE 216 (H) 12/16/2018     Lab Results   Component Value Date    CALCIUM 8.9 12/16/2018     Lab Results   Component Value Date    AST 26 12/16/2018    ALT 22 12/16/2018    ALKPHOS 202 (H) 12/16/2018     Lab Results   Component Value Date    INR 1.29 (H) 12/18/2018     No results found for: COLORU, CLARITYU, SPECGRAV, PHUR, PROTEINUR, GLUCOSEU, KETONESU, BLOODU, NITRITE, LEUKOCYTESUR, BILIRUBINUR, UROBILINOGEN, RBCUA, WBCUA, BACTERIA, UACOMMENT  No results found for: TROPONINT, TROPONINI, BNP  No components found for: HGBA1C;2  No components found for: TSH;2    Imaging Results (all)     Procedure Component Value Units Date/Time    FL Esophagram Complete [[id number]] Collected:  12/16/18 1607     Updated:  12/16/18 1654    Narrative:       ESOPHAGRAM     HISTORY: Episodes of dysphagia and choking.     An initial AP view of the chest shows clear lungs and the heart is top  normal in size. The patient was evaluated in the upright position and  swallowed multiple sips of barium without difficulty. There is normal  distention of the hypopharynx and cervical esophagus. The remainder the  esophagus also appears normal with prompt emptying into the stomach and  no evidence of stricture.     The patient had some slight difficulty swallowing a barium tablet in the  oropharynx but eventually swallowed with multiple sips of water. The  barium tablet passed quickly through the length of the esophagus and  into the stomach.     CONCLUSION: Negative esophagram. No evidence of extrinsic compression or  stricture. 15 imaging sequences were  obtained and the fluoroscopy time  measures 1 minute and 9 seconds.     This report was finalized on 12/16/2018 4:51 PM by Dr. Garett Hess M.D.       XR Chest 2 View [[id number]] Collected:  12/13/18 1958     Updated:  12/13/18 2006    Narrative:       XR CHEST 2 VW-, XR HUMERUS LEFT-, XR SHOULDER 2+ VW LEFT-     Clinical: Fell 4 weeks ago, recent fall, multifocal pain     COMPARISON chest radiograph 10/2/2018     Chest findings: There is a left humerus fracture. The left ribs overall  are satisfactory in appearance.     Cardiac size upper normal. Mediastinum and anaid are satisfactory in  appearance. No pneumothorax, pleural effusion, vascular congestion nor  active airspace disease demonstrated.     CONCLUSION: No active cardiovascular or pulmonary process is  demonstrated. Left humeral fracture.     Left shoulder left humerus findings: There is a fracture demonstrated  through the surgical neck of the left humeral head. There is rotation of  the humeral head however no dislocation. Superior displacement of the  shaft. At the site of fracture the bone demonstrates a mottled  appearance, potential pathologic fracture. The acromioclavicular  alignment is satisfactory. No fracture of the scapula or adjacent ribs.  Overlying soft tissues are satisfactory in appearance. Mid and distal  humerus are normal in appearance.     CONCLUSION: Fracture through the surgical neck of the left humeral head,  potential pathologic fracture.     This report was finalized on 12/13/2018 8:03 PM by Dr. Francisco Dela Cruz M.D.       XR Shoulder 2+ View Left [[id number]] Collected:  12/13/18 1958     Updated:  12/13/18 2006    Narrative:       XR CHEST 2 VW-, XR HUMERUS LEFT-, XR SHOULDER 2+ VW LEFT-     Clinical: Fell 4 weeks ago, recent fall, multifocal pain     COMPARISON chest radiograph 10/2/2018     Chest findings: There is a left humerus fracture. The left ribs overall  are satisfactory in appearance.     Cardiac size upper normal.  Mediastinum and anaid are satisfactory in  appearance. No pneumothorax, pleural effusion, vascular congestion nor  active airspace disease demonstrated.     CONCLUSION: No active cardiovascular or pulmonary process is  demonstrated. Left humeral fracture.     Left shoulder left humerus findings: There is a fracture demonstrated  through the surgical neck of the left humeral head. There is rotation of  the humeral head however no dislocation. Superior displacement of the  shaft. At the site of fracture the bone demonstrates a mottled  appearance, potential pathologic fracture. The acromioclavicular  alignment is satisfactory. No fracture of the scapula or adjacent ribs.  Overlying soft tissues are satisfactory in appearance. Mid and distal  humerus are normal in appearance.     CONCLUSION: Fracture through the surgical neck of the left humeral head,  potential pathologic fracture.     This report was finalized on 12/13/2018 8:03 PM by Dr. Francisco Dela Cruz M.D.       XR Humerus Left [[id number]] Collected:  12/13/18 1958     Updated:  12/13/18 2006    Narrative:       XR CHEST 2 VW-, XR HUMERUS LEFT-, XR SHOULDER 2+ VW LEFT-     Clinical: Fell 4 weeks ago, recent fall, multifocal pain     COMPARISON chest radiograph 10/2/2018     Chest findings: There is a left humerus fracture. The left ribs overall  are satisfactory in appearance.     Cardiac size upper normal. Mediastinum and anaid are satisfactory in  appearance. No pneumothorax, pleural effusion, vascular congestion nor  active airspace disease demonstrated.     CONCLUSION: No active cardiovascular or pulmonary process is  demonstrated. Left humeral fracture.     Left shoulder left humerus findings: There is a fracture demonstrated  through the surgical neck of the left humeral head. There is rotation of  the humeral head however no dislocation. Superior displacement of the  shaft. At the site of fracture the bone demonstrates a mottled  appearance, potential  pathologic fracture. The acromioclavicular  alignment is satisfactory. No fracture of the scapula or adjacent ribs.  Overlying soft tissues are satisfactory in appearance. Mid and distal  humerus are normal in appearance.     CONCLUSION: Fracture through the surgical neck of the left humeral head,  potential pathologic fracture.     This report was finalized on 12/13/2018 8:03 PM by Dr. Francisco Dela Cruz M.D.         No results found for: SITE, ALLENTEST, PHART, GLJ4CDU, PO2ART, CIT8GPB, BASEEXCESS, K2BJAEVY, HGBBG, HCTABG, OXYHEMOGLOBI, METHHGBN, CARBOXYHGB, CO2CT, BAROMETRIC, MODALITY, FIO2       Discharge Medications      New Medications      Instructions Start Date   Cholecalciferol 1000 units tablet   1,000 Units, Oral, Daily      Insulin Glargine 100 UNIT/ML injection pen  Commonly known as:  LANTUS SOLOSTAR   32 Units, Subcutaneous, Every 12 Hours      insulin lispro 100 UNIT/ML injection  Commonly known as:  humaLOG   0-24 Units, Subcutaneous, 4 Times Daily With Meals & Nightly         Changes to Medications      Instructions Start Date   levETIRAcetam 500 MG tablet  Commonly known as:  KEPPRA  What changed:  when to take this   TAKE ONE TABLET BY MOUTH TWICE A DAY         Continue These Medications      Instructions Start Date   ALPRAZolam 0.5 MG tablet  Commonly known as:  XANAX   0.5 mg, Oral, 2 Times Daily PRN      amitriptyline 50 MG tablet  Commonly known as:  ELAVIL   50 mg, Oral, Nightly      B-D UF III MINI PEN NEEDLES 31G X 5 MM misc  Generic drug:  Insulin Pen Needle   USE TO INJECT 5 TO 6 TIMES PER DAY      cyclobenzaprine 5 MG tablet  Commonly known as:  FLEXERIL   5 mg, Oral, Every Night at Bedtime      DULoxetine 30 MG capsule  Commonly known as:  CYMBALTA   90 mg, Oral, Daily      folic acid 1 MG tablet  Commonly known as:  FOLVITE   1 mg, Oral, Daily      FREESTYLE YESIKA SENSOR SYSTEM   1 Device, Does not apply, Every 14 Days      furosemide 40 MG tablet  Commonly known as:  LASIX   40 mg,  Oral, Daily      hydrOXYzine 25 MG tablet  Commonly known as:  ATARAX   50 mg, Oral, 3 Times Daily PRN      multivitamin with minerals tablet tablet   1 tablet, Oral, Daily      OXAYDO 7.5 MG tablet   Generic drug:  OxyCODONE HCl   7.5 mg, Oral, Every 8 Hours PRN      pantoprazole 40 MG EC tablet  Commonly known as:  PROTONIX   40 mg, Oral, Daily      pregabalin 75 MG capsule  Commonly known as:  LYRICA   75 mg, Oral, 2 Times Daily      promethazine 25 MG tablet  Commonly known as:  PHENERGAN   25 mg, Oral, Every 6 Hours PRN      rifaximin 550 MG tablet  Commonly known as:  XIFAXAN   550 mg, Oral, 2 Times Daily      spironolactone 100 MG tablet  Commonly known as:  ALDACTONE   100 mg, Oral, Daily      trimethobenzamide 300 MG capsule  Commonly known as:  TIGAN   300 mg, Oral, 3 Times Daily      vitamin B-12 1000 MCG tablet  Commonly known as:  CYANOCOBALAMIN   1,000 mcg, Oral, Daily         Stop These Medications    HUMULIN R 500 UNIT/ML CONCENTRATED injection  Generic drug:  insulin regular     sucralfate 1 g tablet  Commonly known as:  CARAFATE              Patient Instructions:       Future Appointments   Date Time Provider Department Center   1/3/2019 12:50 PM Sade Roa MD [facility] None   1/7/2019  9:30 AM LAB CHAIR 3 [facility] LAB [facility]   1/7/2019 10:00 AM Sukhdeep Mcdowell MD [facility]   1/28/2019 11:40 AM [facility] None   1/28/2019  1:45 PM Rich Coleman MD [facility] GE [facility] None   1/29/2019  1:15 PM Blanca Pitts MD [facility] None   2/14/2019 12:00 PM Merary Burnett MD [facility] END [facility] None        Follow-up Information     Taz Lozoya MD Follow up on 1/9/2019.    Specialty:  Orthopedic Surgery  Contact information:  [address] [facility] [address]  [place] [address]  [phone]             Blanca Pitts MD .    Specialty:  Internal Medicine  Contact information:  [address] [facility] [address]  [place] [address]  [phone]                    Discharge Order (From admission, onward)    Start     Ordered    12/18/18 1422  Discharge patient  Once     Expected Discharge Date:  12/18/18    Discharge Disposition:  Home or Self Care    Physician of Record for Attribution - Please select from Treatment Team:  JOSE ALMANZA [[id number]]    Review needed by CMO to determine Physician of Record:  No       Question Answer Comment   Physician of Record for Attribution - Please select from Treatment Team JOSE ALMANZA    Review needed by CMO to determine Physician of Record No        12/18/18 1424            TEST  RESULTS PENDING AT DISCHARGE   Order Current Status    Anti-islet Cell Antibody In process    Glutamic Acid Decarboxylase In process    IA-2 Autoantibodies In process    Insulin Antibody In process            Discharge instructions:  Follow up with your primary care provider in 1-2 weeks with a cbc and cmp     F/u with Dr Burnett 2 weeks    Total time spent discharging patient including evaluation, post hospitalization follow up,  medication and post hospitalization instructions and education, total time exceeds 30 minutes.    Signed:  Jose Almanza MD  12/18/2018  2:24 PM

## 2018-12-18 NOTE — PROGRESS NOTES
Chart follow-up.    Recent progress notes and labs noted.     Please let us know if further GI input is desired.     Rich Coleman MD  12/17/2018  7:02 PM

## 2018-12-18 NOTE — PLAN OF CARE
Problem: Patient Care Overview  Goal: Plan of Care Review  Outcome: Ongoing (interventions implemented as appropriate)   12/18/18 1035   Coping/Psychosocial   Plan of Care Reviewed With patient   OTHER   Outcome Summary Pt tolereated treatment with min-moderate c/o a headache, nausea, and weakness. Pt ambulated 150 feet with no AD requiring CGAX2. Pt is still a little unsteady during ambulation but no overt LOB. Pt improving with bed mobility and transfers requiring supervision.    Plan of Care Review   Progress improving

## 2018-12-19 ENCOUNTER — READMISSION MANAGEMENT (OUTPATIENT)
Dept: CALL CENTER | Facility: HOSPITAL | Age: 58
End: 2018-12-19

## 2018-12-19 LAB — GAD65 AB SER-ACNC: <5 U/ML (ref 0–5)

## 2018-12-19 NOTE — OUTREACH NOTE
Prep Survey      Responses   Facility patient discharged from?  [facility]   Is patient eligible?  Yes   Discharge diagnosis  IDDM II with hyperglycemia, Thrombocytopenia, cirrhosis of liver, rheumatoid arthritis, anxiety and depression ,DUKES, HLD, BRICE, Gastroparesis, Hyper ammonemia, Hyponatremia, Anemia, Vit. D.   Does the patient have one of the following disease processes/diagnoses(primary or secondary)?  Other   Does the patient have Home health ordered?  No   Is there a DME ordered?  No   Comments regarding appointments  See AVS   Prep survey completed?  Yes          Blanca Vanegas RN

## 2018-12-19 NOTE — PROGRESS NOTES
Case Management Discharge Note    Final Note: Pt dc'd home     Destination      No service has been selected for the patient.      Durable Medical Equipment      No service has been selected for the patient.      Dialysis/Infusion      No service has been selected for the patient.      Home Medical Care      No service has been selected for the patient.      Community Resources      No service has been selected for the patient.        Other: Other(private vehicle )    Final Discharge Disposition Code: 01 - home or self-care

## 2018-12-20 ENCOUNTER — READMISSION MANAGEMENT (OUTPATIENT)
Dept: CALL CENTER | Facility: HOSPITAL | Age: 58
End: 2018-12-20

## 2018-12-20 LAB — ISLET CELL512 AB SER-ACNC: <1 U/ML

## 2018-12-20 RX ORDER — PROCHLORPERAZINE 25 MG/1
SUPPOSITORY RECTAL
Qty: 1 EACH | Refills: 11 | Status: ON HOLD | OUTPATIENT
Start: 2018-12-20 | End: 2019-01-17

## 2018-12-20 RX ORDER — PROCHLORPERAZINE 25 MG/1
1 SUPPOSITORY RECTAL AS NEEDED
Qty: 1 DEVICE | Refills: 0 | Status: SHIPPED | OUTPATIENT
Start: 2018-12-20 | End: 2019-01-17

## 2018-12-20 RX ORDER — PROCHLORPERAZINE 25 MG/1
1 SUPPOSITORY RECTAL AS NEEDED
Qty: 1 EACH | Refills: 3 | Status: ON HOLD | OUTPATIENT
Start: 2018-12-20 | End: 2019-01-17

## 2018-12-20 NOTE — OUTREACH NOTE
Medical Week 1 Survey      Responses   Facility patient discharged from?  [facility]   Does the patient have one of the following disease processes/diagnoses(primary or secondary)?  Other   Is there a successful TCM telephone encounter documented?  No   Week 1 attempt successful?  Yes   Call start time  1328   Rescheduled  Rescheduled-pt requested   Call end time  1329          Mariana Thacker RN

## 2018-12-21 ENCOUNTER — READMISSION MANAGEMENT (OUTPATIENT)
Dept: CALL CENTER | Facility: HOSPITAL | Age: 58
End: 2018-12-21

## 2018-12-21 LAB
INSULIN AB SER-ACNC: <5 UU/ML
PANC ISLET CELL AB TITR SER: NEGATIVE {TITER}

## 2018-12-21 NOTE — OUTREACH NOTE
Medical Week 1 Survey      Responses   Facility patient discharged from?  [facility]   Does the patient have one of the following disease processes/diagnoses(primary or secondary)?  Other   Is there a successful TCM telephone encounter documented?  No   Week 1 attempt successful?  Yes   Call start time  1011   Call end time  1015   Is patient permission given to speak with other caregiver?  Yes   List who call center can speak with   or mother in law Jose or Kary   Meds reviewed with patient/caregiver?  Yes   Is the patient having any side effects they believe may be caused by any medication additions or changes?  No   Does the patient have all medications ordered at discharge?  Yes   Is the patient taking all medications as directed (includes completed medication regime)?  Yes   Comments regarding appointments  1/3   Does the patient have a primary care provider?   Yes   Does the patient have an appointment with their PCP within 7 days of discharge?  Yes   Comments regarding PCP  Blanca Pitts   Has the patient kept scheduled appointments due by today?  N/A   Has home health visited the patient within 72 hours of discharge?  N/A   What DME was ordered?  pt. uses can as needed.   Psychosocial issues?  No   Did the patient receive a copy of their discharge instructions?  Yes   Nursing interventions  Reviewed instructions with patient   What is the patient's perception of their health status since discharge?  Improving   Is the patient/caregiver able to teach back signs and symptoms related to disease process for when to call PCP?  Yes   Is the patient/caregiver able to teach back signs and symptoms related to disease process for when to call 911?  Yes   Is the patient/caregiver able to teach back the hierarchy of who to call/visit for symptoms/problems? PCP, Specialist, Home health nurse, Urgent Care, ED, 911  Yes   Additional teach back comments  Blood sugars are better than usual    Week 1 call completed?   Yes   Wrap up additional comments  doing better.           Eugenia Weston, RN

## 2018-12-28 ENCOUNTER — OFFICE VISIT (OUTPATIENT)
Dept: INTERNAL MEDICINE | Facility: CLINIC | Age: 58
End: 2018-12-28

## 2018-12-28 ENCOUNTER — RESULTS ENCOUNTER (OUTPATIENT)
Dept: ONCOLOGY | Facility: CLINIC | Age: 58
End: 2018-12-28

## 2018-12-28 VITALS
DIASTOLIC BLOOD PRESSURE: 64 MMHG | WEIGHT: 180 LBS | HEIGHT: 65 IN | BODY MASS INDEX: 29.99 KG/M2 | SYSTOLIC BLOOD PRESSURE: 122 MMHG

## 2018-12-28 DIAGNOSIS — D73.1 HYPERSPLENISM: ICD-10-CM

## 2018-12-28 DIAGNOSIS — R60.9 EDEMA, UNSPECIFIED TYPE: ICD-10-CM

## 2018-12-28 DIAGNOSIS — D61.818 PANCYTOPENIA (HCC): ICD-10-CM

## 2018-12-28 DIAGNOSIS — Z79.4 TYPE 2 DIABETES MELLITUS WITH HYPERGLYCEMIA, WITH LONG-TERM CURRENT USE OF INSULIN (HCC): Primary | ICD-10-CM

## 2018-12-28 DIAGNOSIS — E78.5 HYPERLIPIDEMIA, UNSPECIFIED HYPERLIPIDEMIA TYPE: ICD-10-CM

## 2018-12-28 DIAGNOSIS — D69.3 ACUTE ITP (HCC): ICD-10-CM

## 2018-12-28 DIAGNOSIS — K74.60 CIRRHOSIS OF LIVER WITHOUT ASCITES, UNSPECIFIED HEPATIC CIRRHOSIS TYPE (HCC): ICD-10-CM

## 2018-12-28 DIAGNOSIS — E11.65 TYPE 2 DIABETES MELLITUS WITH HYPERGLYCEMIA, WITH LONG-TERM CURRENT USE OF INSULIN (HCC): Primary | ICD-10-CM

## 2018-12-28 PROCEDURE — 99213 OFFICE O/P EST LOW 20 MIN: CPT | Performed by: INTERNAL MEDICINE

## 2018-12-28 NOTE — PROGRESS NOTES
Chief Complaint   Patient presents with   • Follow-up     hosp follow up   • Foot Swelling       Subjective   Silvia Zabala is a 58 y.o. female.     History of Present Illness     She was recently hospitalized with uncontrolled blood sugars.  She was seen by endocrinology during her hospitalization.  Insulin dosage has been adjusted.  Just prior to this hospitalization, she had a fall and broke her left shoulder.  Since discharge, she has generally been feeling okay.  She is keeping up with her medications.  She has noticed some foot swelling recently.  She is taking Lasix and spironolactone.  Despite this, she has had a little bit of swelling.  No shortness of breath.  No chest pain.  No chest pressure.      The following portions of the patient's history were reviewed and updated as appropriate: allergies, current medications, past family history, past medical history, past social history, past surgical history and problem list.    Review of Systems   Constitutional: Negative for appetite change.   Respiratory: Negative for cough, shortness of breath and wheezing.    Cardiovascular: Positive for leg swelling (Feet, right more so than left). Negative for chest pain.         Current Outpatient Medications:   •  ALPRAZolam (XANAX) 0.5 MG tablet, Take 1 tablet by mouth 2 (Two) Times a Day As Needed for Anxiety., Disp: 60 tablet, Rfl: 1  •  amitriptyline (ELAVIL) 50 MG tablet, Take 1 tablet by mouth Every Night., Disp: 30 tablet, Rfl: 0  •  B-D UF III MINI PEN NEEDLES 31G X 5 MM misc, USE TO INJECT 5 TO 6 TIMES PER DAY, Disp: 200 each, Rfl: 2  •  cholecalciferol 1000 units tablet, Take 1,000 Units by mouth Daily., Disp: , Rfl:   •  Continuous Blood Gluc  (DEXCOM G6 ) device, 1 Device As Needed (use with sensor)., Disp: 1 Device, Rfl: 0  •  Continuous Blood Gluc Sensor (DEXCOM G6 SENSOR), Every 10 (Ten) Days., Disp: 1 each, Rfl: 11  •  Continuous Blood Gluc Sensor (FREESTYLE YESIKA SENSOR SYSTEM), 1  Device Every 14 (Fourteen) Days., Disp: 2 each, Rfl: 3  •  Continuous Blood Gluc Transmit (DEXCOM G6 TRANSMITTER) misc, 1 kit As Needed (one transmit every 90 days)., Disp: 1 each, Rfl: 3  •  cyclobenzaprine (FLEXERIL) 5 MG tablet, Take 5 mg by mouth every night at bedtime., Disp: , Rfl:   •  DULoxetine (CYMBALTA) 30 MG capsule, Take 90 mg by mouth Daily., Disp: , Rfl:   •  folic acid (FOLVITE) 1 MG tablet, Take 1 mg by mouth Daily., Disp: , Rfl:   •  furosemide (LASIX) 40 MG tablet, Take 1 tablet by mouth Daily., Disp: 30 tablet, Rfl: 2  •  hydrOXYzine (ATARAX) 25 MG tablet, Take 50 mg by mouth 3 (Three) Times a Day As Needed for Itching., Disp: , Rfl:   •  Insulin Glargine (LANTUS SOLOSTAR) 100 UNIT/ML injection pen, Inject 32 Units under the skin into the appropriate area as directed Every 12 (Twelve) Hours., Disp: 15 mL, Rfl: 1  •  insulin lispro (humaLOG) 100 UNIT/ML injection, Inject 0-24 Units under the skin into the appropriate area as directed 4 (Four) Times a Day With Meals & at Bedtime for 30 days., Disp: 2880 Units, Rfl: 0  •  levETIRAcetam (KEPPRA) 500 MG tablet, TAKE ONE TABLET BY MOUTH TWICE A DAY, Disp: 60 tablet, Rfl: 0  •  Multiple Vitamins-Minerals (MULTIVITAMIN WITH MINERALS) tablet tablet, Take 1 tablet by mouth Daily., Disp: , Rfl:   •  OxyCODONE HCl (OXAYDO) 7.5 MG tablet , Take 7.5 mg by mouth Every 8 (Eight) Hours As Needed., Disp: , Rfl:   •  pantoprazole (PROTONIX) 40 MG EC tablet, Take 40 mg by mouth Daily., Disp: , Rfl:   •  pregabalin (LYRICA) 75 MG capsule, Take 75 mg by mouth 2 (Two) Times a Day., Disp: , Rfl:   •  promethazine (PHENERGAN) 25 MG tablet, Take 25 mg by mouth Every 6 (Six) Hours As Needed for Nausea or Vomiting., Disp: , Rfl:   •  rifaximin (XIFAXAN) 550 MG tablet, Take 550 mg by mouth 2 (Two) Times a Day., Disp: , Rfl:   •  spironolactone (ALDACTONE) 100 MG tablet, Take 100 mg by mouth Daily., Disp: , Rfl:   •  trimethobenzamide (TIGAN) 300 MG capsule, Take 300 mg by  "mouth 3 (Three) Times a Day., Disp: , Rfl:   •  vitamin B-12 (CYANOCOBALAMIN) 1000 MCG tablet, Take 1,000 mcg by mouth daily., Disp: , Rfl:         Objective     /64   Ht 165.1 cm (65\") Comment: measured today  Wt 81.6 kg (180 lb)   BMI 29.95 kg/m²     Physical Exam   Constitutional: She appears well-developed and well-nourished.   Left arm is in a sling.   Cardiovascular: Normal rate, regular rhythm and normal heart sounds. Exam reveals no gallop and no friction rub.   No murmur heard.  Pulmonary/Chest: Effort normal and breath sounds normal. No respiratory distress.   Musculoskeletal: She exhibits edema (trace, bilateral ankles). She exhibits no tenderness.   Neurological: She is alert.   Skin: Skin is warm and dry.   Nursing note and vitals reviewed.        Assessment/Plan   Silvia was seen today for follow-up and foot swelling.    Diagnoses and all orders for this visit:    Type 2 diabetes mellitus with hyperglycemia, with long-term current use of insulin (CMS/[facility])    Hyperlipidemia, unspecified hyperlipidemia type    Edema, unspecified type      She will continue current dose of lasix and spironolactone.  Advised her she could also try wearing light compression hose. She will continue current insulin regimen and follow up with endocrinology.  Encouraged prudent diet and regular exercise.      Current outpatient and discharge medications have been reconciled for the patient.  Reviewed by: Blanca Pitts MD        "

## 2019-01-02 ENCOUNTER — READMISSION MANAGEMENT (OUTPATIENT)
Dept: CALL CENTER | Facility: HOSPITAL | Age: 59
End: 2019-01-02

## 2019-01-02 RX ORDER — FOLIC ACID 1 MG/1
TABLET ORAL
Qty: 30 TABLET | Refills: 2 | Status: ON HOLD | OUTPATIENT
Start: 2019-01-02 | End: 2019-01-17

## 2019-01-02 NOTE — OUTREACH NOTE
Medical Week 3 Survey      Responses   Facility patient discharged from?  [facility]   Does the patient have one of the following disease processes/diagnoses(primary or secondary)?  Other   Week 3 attempt successful?  Yes   Call start time  1023   Call end time  1029   Meds reviewed with patient/caregiver?  Yes   Is the patient taking all medications as directed (includes completed medication regime)?  Yes   Has the patient kept scheduled appointments due by today?  No   Nursing Interventions  Advised to schedule with specialist   Comments  , will be seeing a doctor for transplant    What is the patient's perception of their health status since discharge?  New symptoms unrelated to diagnosis [rajni is nauseated today, and does have phenergan ordered]   Additional teach back comments  blood sugar today was 251, patient stated it is not better   Week 3 Call Completed?  Yes   Wrap up additional comments  has several doctors appointments coming up and will be seeing a doctor on the transplant team          Marlin Bautista RN

## 2019-01-07 ENCOUNTER — APPOINTMENT (OUTPATIENT)
Dept: ONCOLOGY | Facility: CLINIC | Age: 59
End: 2019-01-07

## 2019-01-07 ENCOUNTER — APPOINTMENT (OUTPATIENT)
Dept: LAB | Facility: HOSPITAL | Age: 59
End: 2019-01-07

## 2019-01-08 RX ORDER — CYCLOBENZAPRINE HCL 5 MG
TABLET ORAL
Qty: 30 TABLET | Refills: 0 | Status: ON HOLD | OUTPATIENT
Start: 2019-01-08 | End: 2019-01-17 | Stop reason: SDUPTHER

## 2019-01-10 ENCOUNTER — READMISSION MANAGEMENT (OUTPATIENT)
Dept: CALL CENTER | Facility: HOSPITAL | Age: 59
End: 2019-01-10

## 2019-01-10 NOTE — OUTREACH NOTE
"Medical Week 4 Survey      Responses   Facility patient discharged from?  [facility]   Does the patient have one of the following disease processes/diagnoses(primary or secondary)?  Other   Week 4 attempt successful?  Yes   Call start time  0930   Call end time  0946   Discharge diagnosis  IDDM II with hyperglycemia, Thrombocytopenia, cirrhosis of liver, rheumatoid arthritis, anxiety and depression ,DUKES, HLD, BRICE, Gastroparesis, Hyper ammonemia, Hyponatremia, Anemia, Vit. D.   Meds reviewed with patient/caregiver?  Yes   Is the patient taking all medications as directed (includes completed medication regime)?  Yes   Has the patient kept scheduled appointments due by today?  Yes   Is the patient still receiving Home Health Services?  N/A   What is the patient's perception of their health status since discharge?  Worsening   Additional teach back comments  Pt says she feels\"not very well\". SHe is having a lot of N/V and B/S are high. She was seen by \"joint\" Dr yesterday, but has not seen Dr Burnett or Dr Pitts yet and does not have appt until Feb and May. Pt also was given humulin R by drug store yesterday,  which was stopped while in hospital. Advised to call endocrinology Dr and PCP. Discussed that she needed to be seen by a Dr soon.   Week 4 Call Completed?  Yes   Would the patient like one additional call?  Yes          Jamee Gooden RN  "

## 2019-01-16 RX ORDER — LEVETIRACETAM 500 MG/1
TABLET ORAL
Qty: 60 TABLET | Refills: 1 | Status: ON HOLD | OUTPATIENT
Start: 2019-01-16 | End: 2019-01-17

## 2019-01-17 ENCOUNTER — HOSPITAL ENCOUNTER (INPATIENT)
Facility: HOSPITAL | Age: 59
LOS: 5 days | Discharge: HOME | End: 2019-01-22
Attending: EMERGENCY MEDICINE | Admitting: INTERNAL MEDICINE

## 2019-01-17 ENCOUNTER — READMISSION MANAGEMENT (OUTPATIENT)
Dept: CALL CENTER | Facility: HOSPITAL | Age: 59
End: 2019-01-17

## 2019-01-17 DIAGNOSIS — R73.9 HYPERGLYCEMIA: Primary | ICD-10-CM

## 2019-01-17 DIAGNOSIS — E11.65 POORLY CONTROLLED TYPE 2 DIABETES MELLITUS (HCC): ICD-10-CM

## 2019-01-17 DIAGNOSIS — K31.84 GASTROPARESIS: ICD-10-CM

## 2019-01-17 DIAGNOSIS — N17.9 AKI (ACUTE KIDNEY INJURY) (HCC): ICD-10-CM

## 2019-01-17 PROBLEM — E86.0 DEHYDRATION: Status: ACTIVE | Noted: 2019-01-17

## 2019-01-17 LAB
ALBUMIN SERPL-MCNC: 4.7 G/DL (ref 3.5–5.2)
ALBUMIN/GLOB SERPL: 1.4 G/DL
ALP SERPL-CCNC: 237 U/L (ref 39–117)
ALT SERPL W P-5'-P-CCNC: 34 U/L (ref 1–33)
AMMONIA BLD-SCNC: 43 UMOL/L (ref 11–51)
ANION GAP SERPL CALCULATED.3IONS-SCNC: 18.7 MMOL/L
APTT PPP: 31.4 SECONDS (ref 22.7–35.4)
ARTERIAL PATENCY WRIST A: POSITIVE
AST SERPL-CCNC: 28 U/L (ref 1–32)
ATMOSPHERIC PRESS: 752.9 MMHG
B-OH-BUTYR SERPL-SCNC: 1.38 MMOL/L (ref 0.02–0.27)
BASE EXCESS BLDA CALC-SCNC: -1.2 MMOL/L (ref 0–2)
BASOPHILS # BLD AUTO: 0.02 10*3/MM3 (ref 0–0.2)
BASOPHILS NFR BLD AUTO: 0.7 % (ref 0–1.5)
BDY SITE: ABNORMAL
BILIRUB SERPL-MCNC: 1.7 MG/DL (ref 0.1–1.2)
BILIRUB UR QL STRIP: NEGATIVE
BUN BLD-MCNC: 17 MG/DL (ref 6–20)
BUN/CREAT SERPL: 15 (ref 7–25)
CALCIUM SPEC-SCNC: 9.6 MG/DL (ref 8.6–10.5)
CHLORIDE SERPL-SCNC: 88 MMOL/L (ref 98–107)
CLARITY UR: CLEAR
CO2 SERPL-SCNC: 22.3 MMOL/L (ref 22–29)
COLOR UR: YELLOW
CREAT BLD-MCNC: 1.13 MG/DL (ref 0.57–1)
DEPRECATED RDW RBC AUTO: 45.8 FL (ref 37–54)
EOSINOPHIL # BLD AUTO: 0.09 10*3/MM3 (ref 0–0.7)
EOSINOPHIL NFR BLD AUTO: 3.1 % (ref 0.3–6.2)
ERYTHROCYTE [DISTWIDTH] IN BLOOD BY AUTOMATED COUNT: 15 % (ref 11.7–13)
GFR SERPL CREATININE-BSD FRML MDRD: 50 ML/MIN/1.73
GLOBULIN UR ELPH-MCNC: 3.3 GM/DL
GLUCOSE BLD-MCNC: 633 MG/DL (ref 65–99)
GLUCOSE BLDC GLUCOMTR-MCNC: 372 MG/DL (ref 70–130)
GLUCOSE BLDC GLUCOMTR-MCNC: 435 MG/DL (ref 70–130)
GLUCOSE BLDC GLUCOMTR-MCNC: 521 MG/DL (ref 70–130)
GLUCOSE BLDC GLUCOMTR-MCNC: 594 MG/DL (ref 70–130)
GLUCOSE BLDC GLUCOMTR-MCNC: >599 MG/DL (ref 70–130)
GLUCOSE UR STRIP-MCNC: ABNORMAL MG/DL
HBA1C MFR BLD: 10.5 % (ref 4.8–5.6)
HCO3 BLDA-SCNC: 22.5 MMOL/L (ref 22–28)
HCT VFR BLD AUTO: 36.7 % (ref 35.6–45.5)
HGB BLD-MCNC: 11.8 G/DL (ref 11.9–15.5)
HGB UR QL STRIP.AUTO: NEGATIVE
HOLD SPECIMEN: NORMAL
HOLD SPECIMEN: NORMAL
IMM GRANULOCYTES # BLD AUTO: 0 10*3/MM3 (ref 0–0.03)
IMM GRANULOCYTES NFR BLD AUTO: 0 % (ref 0–0.5)
INR PPP: 1.27 (ref 0.9–1.1)
KETONES UR QL STRIP: ABNORMAL
LEUKOCYTE ESTERASE UR QL STRIP.AUTO: NEGATIVE
LIPASE SERPL-CCNC: 9 U/L (ref 13–60)
LYMPHOCYTES # BLD AUTO: 0.52 10*3/MM3 (ref 0.9–4.8)
LYMPHOCYTES NFR BLD AUTO: 17.9 % (ref 19.6–45.3)
MAGNESIUM SERPL-MCNC: 1.9 MG/DL (ref 1.6–2.6)
MCH RBC QN AUTO: 26.8 PG (ref 26.9–32)
MCHC RBC AUTO-ENTMCNC: 32.2 G/DL (ref 32.4–36.3)
MCV RBC AUTO: 83.2 FL (ref 80.5–98.2)
MODALITY: ABNORMAL
MONOCYTES # BLD AUTO: 0.3 10*3/MM3 (ref 0.2–1.2)
MONOCYTES NFR BLD AUTO: 10.3 % (ref 5–12)
NEUTROPHILS # BLD AUTO: 1.97 10*3/MM3 (ref 1.9–8.1)
NEUTROPHILS NFR BLD AUTO: 68 % (ref 42.7–76)
NITRITE UR QL STRIP: NEGATIVE
PCO2 BLDA: 33.4 MM HG (ref 35–45)
PH BLDA: 7.44 PH UNITS (ref 7.35–7.45)
PH UR STRIP.AUTO: 6 [PH] (ref 5–8)
PLATELET # BLD AUTO: 107 10*3/MM3 (ref 140–500)
PMV BLD AUTO: 10.8 FL (ref 6–12)
PO2 BLDA: 83.9 MM HG (ref 80–100)
POTASSIUM BLD-SCNC: 4.1 MMOL/L (ref 3.5–5.2)
PROT SERPL-MCNC: 8 G/DL (ref 6–8.5)
PROT UR QL STRIP: NEGATIVE
PROTHROMBIN TIME: 15.7 SECONDS (ref 11.7–14.2)
RBC # BLD AUTO: 4.41 10*6/MM3 (ref 3.9–5.2)
SAO2 % BLDCOA: 96.7 % (ref 92–99)
SODIUM BLD-SCNC: 129 MMOL/L (ref 136–145)
SP GR UR STRIP: >=1.03 (ref 1–1.03)
TOTAL RATE: 16 BREATHS/MINUTE
UROBILINOGEN UR QL STRIP: ABNORMAL
WBC NRBC COR # BLD: 2.9 10*3/MM3 (ref 4.5–10.7)
WHOLE BLOOD HOLD SPECIMEN: NORMAL
WHOLE BLOOD HOLD SPECIMEN: NORMAL

## 2019-01-17 PROCEDURE — 80053 COMPREHEN METABOLIC PANEL: CPT | Performed by: EMERGENCY MEDICINE

## 2019-01-17 PROCEDURE — 85730 THROMBOPLASTIN TIME PARTIAL: CPT | Performed by: EMERGENCY MEDICINE

## 2019-01-17 PROCEDURE — 82962 GLUCOSE BLOOD TEST: CPT

## 2019-01-17 PROCEDURE — 82803 BLOOD GASES ANY COMBINATION: CPT

## 2019-01-17 PROCEDURE — 82010 KETONE BODYS QUAN: CPT | Performed by: EMERGENCY MEDICINE

## 2019-01-17 PROCEDURE — 63710000001 INSULIN REGULAR HUMAN PER 5 UNITS: Performed by: EMERGENCY MEDICINE

## 2019-01-17 PROCEDURE — 25010000002 ONDANSETRON PER 1 MG: Performed by: EMERGENCY MEDICINE

## 2019-01-17 PROCEDURE — 81003 URINALYSIS AUTO W/O SCOPE: CPT | Performed by: EMERGENCY MEDICINE

## 2019-01-17 PROCEDURE — 85610 PROTHROMBIN TIME: CPT | Performed by: EMERGENCY MEDICINE

## 2019-01-17 PROCEDURE — 93005 ELECTROCARDIOGRAM TRACING: CPT | Performed by: EMERGENCY MEDICINE

## 2019-01-17 PROCEDURE — 83735 ASSAY OF MAGNESIUM: CPT | Performed by: EMERGENCY MEDICINE

## 2019-01-17 PROCEDURE — 63710000001 INSULIN LISPRO (HUMAN) PER 5 UNITS: Performed by: INTERNAL MEDICINE

## 2019-01-17 PROCEDURE — 83690 ASSAY OF LIPASE: CPT | Performed by: EMERGENCY MEDICINE

## 2019-01-17 PROCEDURE — 82140 ASSAY OF AMMONIA: CPT | Performed by: EMERGENCY MEDICINE

## 2019-01-17 PROCEDURE — 36600 WITHDRAWAL OF ARTERIAL BLOOD: CPT

## 2019-01-17 PROCEDURE — 99284 EMERGENCY DEPT VISIT MOD MDM: CPT

## 2019-01-17 PROCEDURE — 93010 ELECTROCARDIOGRAM REPORT: CPT | Performed by: INTERNAL MEDICINE

## 2019-01-17 PROCEDURE — 85025 COMPLETE CBC W/AUTO DIFF WBC: CPT | Performed by: EMERGENCY MEDICINE

## 2019-01-17 PROCEDURE — 63710000001 INSULIN GLARGINE PER 5 UNITS: Performed by: INTERNAL MEDICINE

## 2019-01-17 PROCEDURE — 83036 HEMOGLOBIN GLYCOSYLATED A1C: CPT | Performed by: INTERNAL MEDICINE

## 2019-01-17 RX ORDER — SODIUM CHLORIDE 9 MG/ML
75 INJECTION, SOLUTION INTRAVENOUS CONTINUOUS
Status: DISCONTINUED | OUTPATIENT
Start: 2019-01-17 | End: 2019-01-19

## 2019-01-17 RX ORDER — ONDANSETRON 2 MG/ML
4 INJECTION INTRAMUSCULAR; INTRAVENOUS ONCE
Status: COMPLETED | OUTPATIENT
Start: 2019-01-17 | End: 2019-01-17

## 2019-01-17 RX ORDER — NICOTINE POLACRILEX 4 MG
15 LOZENGE BUCCAL
Status: DISCONTINUED | OUTPATIENT
Start: 2019-01-17 | End: 2019-01-22 | Stop reason: HOSPADM

## 2019-01-17 RX ORDER — FOLIC ACID 1 MG/1
1000 TABLET ORAL DAILY
Status: DISCONTINUED | OUTPATIENT
Start: 2019-01-17 | End: 2019-01-22 | Stop reason: HOSPADM

## 2019-01-17 RX ORDER — AMITRIPTYLINE HYDROCHLORIDE 50 MG/1
50 TABLET, FILM COATED ORAL NIGHTLY
Status: DISCONTINUED | OUTPATIENT
Start: 2019-01-17 | End: 2019-01-22 | Stop reason: HOSPADM

## 2019-01-17 RX ORDER — SODIUM CHLORIDE 0.9 % (FLUSH) 0.9 %
10 SYRINGE (ML) INJECTION AS NEEDED
Status: DISCONTINUED | OUTPATIENT
Start: 2019-01-17 | End: 2019-01-22 | Stop reason: HOSPADM

## 2019-01-17 RX ORDER — ACETAMINOPHEN 325 MG/1
650 TABLET ORAL EVERY 4 HOURS PRN
Status: DISCONTINUED | OUTPATIENT
Start: 2019-01-17 | End: 2019-01-22 | Stop reason: HOSPADM

## 2019-01-17 RX ORDER — CYCLOBENZAPRINE HCL 10 MG
5 TABLET ORAL NIGHTLY PRN
Status: DISCONTINUED | OUTPATIENT
Start: 2019-01-17 | End: 2019-01-22 | Stop reason: HOSPADM

## 2019-01-17 RX ORDER — DEXTROSE MONOHYDRATE 25 G/50ML
25 INJECTION, SOLUTION INTRAVENOUS
Status: DISCONTINUED | OUTPATIENT
Start: 2019-01-17 | End: 2019-01-22 | Stop reason: HOSPADM

## 2019-01-17 RX ORDER — ALPRAZOLAM 0.5 MG/1
0.5 TABLET ORAL 2 TIMES DAILY PRN
Status: DISCONTINUED | OUTPATIENT
Start: 2019-01-17 | End: 2019-01-22 | Stop reason: HOSPADM

## 2019-01-17 RX ORDER — LEVETIRACETAM 500 MG/1
500 TABLET ORAL 2 TIMES DAILY
COMMUNITY
End: 2019-05-02 | Stop reason: SDUPTHER

## 2019-01-17 RX ORDER — PREGABALIN 75 MG/1
75 CAPSULE ORAL EVERY 12 HOURS SCHEDULED
Status: DISCONTINUED | OUTPATIENT
Start: 2019-01-17 | End: 2019-01-22 | Stop reason: HOSPADM

## 2019-01-17 RX ORDER — INSULIN GLARGINE 100 [IU]/ML
32 INJECTION, SOLUTION SUBCUTANEOUS EVERY 12 HOURS SCHEDULED
Status: DISCONTINUED | OUTPATIENT
Start: 2019-01-17 | End: 2019-01-20

## 2019-01-17 RX ORDER — SODIUM CHLORIDE 0.9 % (FLUSH) 0.9 %
3-10 SYRINGE (ML) INJECTION AS NEEDED
Status: DISCONTINUED | OUTPATIENT
Start: 2019-01-17 | End: 2019-01-22 | Stop reason: HOSPADM

## 2019-01-17 RX ORDER — SODIUM CHLORIDE 0.9 % (FLUSH) 0.9 %
3 SYRINGE (ML) INJECTION EVERY 12 HOURS SCHEDULED
Status: DISCONTINUED | OUTPATIENT
Start: 2019-01-17 | End: 2019-01-22 | Stop reason: HOSPADM

## 2019-01-17 RX ORDER — LEVETIRACETAM 500 MG/1
500 TABLET ORAL 2 TIMES DAILY
Status: DISCONTINUED | OUTPATIENT
Start: 2019-01-17 | End: 2019-01-22 | Stop reason: HOSPADM

## 2019-01-17 RX ORDER — FOLIC ACID 1 MG/1
1 TABLET ORAL DAILY
COMMUNITY
End: 2020-12-10

## 2019-01-17 RX ORDER — ONDANSETRON 2 MG/ML
4 INJECTION INTRAMUSCULAR; INTRAVENOUS EVERY 6 HOURS PRN
Status: DISCONTINUED | OUTPATIENT
Start: 2019-01-17 | End: 2019-01-22 | Stop reason: HOSPADM

## 2019-01-17 RX ORDER — PANTOPRAZOLE SODIUM 40 MG/1
40 TABLET, DELAYED RELEASE ORAL DAILY
Status: DISCONTINUED | OUTPATIENT
Start: 2019-01-17 | End: 2019-01-22 | Stop reason: HOSPADM

## 2019-01-17 RX ADMIN — SODIUM CHLORIDE 125 ML/HR: 9 INJECTION, SOLUTION INTRAVENOUS at 18:19

## 2019-01-17 RX ADMIN — INSULIN GLARGINE 32 UNITS: 100 INJECTION, SOLUTION SUBCUTANEOUS at 20:01

## 2019-01-17 RX ADMIN — INSULIN HUMAN 10 UNITS: 100 INJECTION, SOLUTION PARENTERAL at 13:36

## 2019-01-17 RX ADMIN — ACETAMINOPHEN 650 MG: 325 TABLET, FILM COATED ORAL at 23:23

## 2019-01-17 RX ADMIN — DULOXETINE HYDROCHLORIDE 90 MG: 60 CAPSULE, DELAYED RELEASE ORAL at 20:02

## 2019-01-17 RX ADMIN — INSULIN HUMAN 10 UNITS: 100 INJECTION, SOLUTION PARENTERAL at 14:19

## 2019-01-17 RX ADMIN — RIFAXIMIN 550 MG: 550 TABLET ORAL at 20:03

## 2019-01-17 RX ADMIN — LEVETIRACETAM 500 MG: 500 TABLET, FILM COATED ORAL at 20:03

## 2019-01-17 RX ADMIN — SODIUM CHLORIDE 1000 ML: 9 INJECTION, SOLUTION INTRAVENOUS at 13:39

## 2019-01-17 RX ADMIN — AMITRIPTYLINE HYDROCHLORIDE 50 MG: 50 TABLET, FILM COATED ORAL at 20:03

## 2019-01-17 RX ADMIN — INSULIN HUMAN 10 UNITS: 100 INJECTION, SOLUTION PARENTERAL at 15:48

## 2019-01-17 RX ADMIN — ONDANSETRON 4 MG: 2 INJECTION INTRAMUSCULAR; INTRAVENOUS at 13:38

## 2019-01-17 RX ADMIN — INSULIN LISPRO 12 UNITS: 100 INJECTION, SOLUTION INTRAVENOUS; SUBCUTANEOUS at 18:58

## 2019-01-17 RX ADMIN — TRIMETHOBENZAMIDE HYDROCHLORIDE 300 MG: 300 CAPSULE ORAL at 21:15

## 2019-01-17 RX ADMIN — CYCLOBENZAPRINE 5 MG: 10 TABLET, FILM COATED ORAL at 21:15

## 2019-01-17 RX ADMIN — PANTOPRAZOLE SODIUM 40 MG: 40 TABLET, DELAYED RELEASE ORAL at 20:03

## 2019-01-17 RX ADMIN — FOLIC ACID 1000 MCG: 1 TABLET ORAL at 20:03

## 2019-01-17 RX ADMIN — SODIUM CHLORIDE 1000 ML: 9 INJECTION, SOLUTION INTRAVENOUS at 15:31

## 2019-01-17 RX ADMIN — SODIUM CHLORIDE, PRESERVATIVE FREE 3 ML: 5 INJECTION INTRAVENOUS at 20:02

## 2019-01-17 RX ADMIN — ALPRAZOLAM 0.5 MG: 0.5 TABLET ORAL at 21:15

## 2019-01-17 RX ADMIN — PREGABALIN 75 MG: 75 CAPSULE ORAL at 20:03

## 2019-01-17 NOTE — OUTREACH NOTE
Medical Week 5 Survey      Responses   Facility patient discharged from?  [facility]   Does the patient have one of the following disease processes/diagnoses(primary or secondary)?  Other   Week 5 attempt successful?  No   Rescheduled  Revoked   Revoke  Decline to participate          Princess Cid RN

## 2019-01-17 NOTE — ED NOTES
Nursing report ED to floor  Silvia Zabala  58 y.o.  female    HPI (triage note):   Chief Complaint   Patient presents with   • Hyperglycemia       Admitting doctor:   Wesly Walsh MD    Admitting diagnosis:   The primary encounter diagnosis was Hyperglycemia. Diagnoses of Poorly controlled type 2 diabetes mellitus (CMS/HCC), APRIL (acute kidney injury) (CMS/HCC), and Gastroparesis were also pertinent to this visit.    Code status:   Current Code Status     Date Active Code Status Order ID Comments User Context       Prior          Allergies:   Albuterol; Tramadol; Lactulose; and Quinine derivatives    Weight:       01/17/19  1255   Weight: 82.6 kg (182 lb)       Most recent vitals:   Vitals:    01/17/19 1412 01/17/19 1515 01/17/19 1531 01/17/19 1627   BP: 134/74 132/73 130/77 130/79   BP Location:   Right arm    Patient Position:   Lying    Pulse: 93 98 96 102   Resp: 18  17 18   Temp:       TempSrc:       SpO2: 93% 95% 97% 97%   Weight:       Height:           Active LDAs/IV Access:   Lines, Drains & Airways    Active LDAs     Name:   Placement date:   Placement time:   Site:   Days:    Peripheral IV 01/17/19 1258 Left Antecubital   01/17/19    1258    Antecubital   less than 1    Peripheral IV 01/17/19 1304 Right Antecubital   01/17/19    1304    Antecubital   less than 1    External Urinary Catheter   01/17/19    1632    --   less than 1                Labs (abnormal labs have a star):   Labs Reviewed   COMPREHENSIVE METABOLIC PANEL - Abnormal; Notable for the following components:       Result Value    Glucose 633 (*)     Creatinine 1.13 (*)     Sodium 129 (*)     Chloride 88 (*)     ALT (SGPT) 34 (*)     Alkaline Phosphatase 237 (*)     Total Bilirubin 1.7 (*)     eGFR Non  Amer 50 (*)     All other components within normal limits   PROTIME-INR - Abnormal; Notable for the following components:    Protime 15.7 (*)     INR 1.27 (*)     All other components within normal limits   LIPASE -  Abnormal; Notable for the following components:    Lipase 9 (*)     All other components within normal limits   BETA HYDROXYBUTYRATE QUANTITATIVE - Abnormal; Notable for the following components:    Beta-Hydroxybutyrate Quant 1.382 (*)     All other components within normal limits   CBC WITH AUTO DIFFERENTIAL - Abnormal; Notable for the following components:    WBC 2.90 (*)     Hemoglobin 11.8 (*)     MCH 26.8 (*)     MCHC 32.2 (*)     RDW 15.0 (*)     Platelets 107 (*)     Lymphocyte % 17.9 (*)     Lymphocytes, Absolute 0.52 (*)     All other components within normal limits   BLOOD GAS, ARTERIAL - Abnormal; Notable for the following components:    pCO2, Arterial 33.4 (*)     Base Excess, Arterial -1.2 (*)     All other components within normal limits   POCT GLUCOSE FINGERSTICK - Abnormal; Notable for the following components:    Glucose >599 (*)     All other components within normal limits   POCT GLUCOSE FINGERSTICK - Abnormal; Notable for the following components:    Glucose 594 (*)     All other components within normal limits   POCT GLUCOSE FINGERSTICK - Abnormal; Notable for the following components:    Glucose 521 (*)     All other components within normal limits   APTT - Normal   MAGNESIUM - Normal   AMMONIA - Normal   RAINBOW DRAW    Narrative:     The following orders were created for panel order [facility] Draw.  Procedure                               Abnormality         Status                     ---------                               -----------         ------                     Light Blue Top[[id number]]                                   Final result               Green Top (Gel)[[id number]]                                  Final result               Lavender Top[[id number]]                                     Final result               Gold Top - [facility][[id number]]                                   Final result                 Please view results for these tests on the individual orders.   BLOOD GAS,  ARTERIAL   URINALYSIS W/ MICROSCOPIC IF INDICATED (NO CULTURE)   POCT GLUCOSE FINGERSTICK   POCT GLUCOSE FINGERSTICK   POCT GLUCOSE FINGERSTICK   POCT GLUCOSE FINGERSTICK   POCT GLUCOSE FINGERSTICK   POCT GLUCOSE FINGERSTICK   POCT GLUCOSE FINGERSTICK   POCT GLUCOSE FINGERSTICK   POCT GLUCOSE FINGERSTICK   POCT GLUCOSE FINGERSTICK   POCT GLUCOSE FINGERSTICK   POCT GLUCOSE FINGERSTICK   LIGHT BLUE TOP   GREEN TOP   LAVENDER TOP   GOLD TOP - [facility]   CBC AND DIFFERENTIAL    Narrative:     The following orders were created for panel order CBC & Differential.  Procedure                               Abnormality         Status                     ---------                               -----------         ------                     CBC Auto Differential[[id number]]        Abnormal            Final result                 Please view results for these tests on the individual orders.   KETONE BODIES SERUM    Narrative:     The following orders were created for panel order Ketone Bodies, Serum (Not performed at [place]).  Procedure                               Abnormality         Status                     ---------                               -----------         ------                     Beta Hydroxybutyrate Javier...[[id number]]  Abnormal            Final result                 Please view results for these tests on the individual orders.       EKG:   ECG 12 Lead             Meds given in ED:   Medications   sodium chloride 0.9 % flush 10 mL (not administered)   sodium chloride 0.9 % infusion (not administered)   sodium chloride 0.9 % bolus 1,000 mL (0 mL Intravenous Stopped 1/17/19 1530)   ondansetron (ZOFRAN) injection 4 mg (4 mg Intravenous Given 1/17/19 1338)   insulin regular (humuLIN R,novoLIN R) injection 10 Units (10 Units Subcutaneous Given 1/17/19 1336)   insulin regular (humuLIN R,novoLIN R) injection 10 Units (10 Units Intravenous Given 1/17/19 1419)   insulin regular (humuLIN R,novoLIN R) injection 10 Units (10  Units Intravenous Given 1/17/19 4168)   sodium chloride 0.9 % bolus 1,000 mL (0 mL Intravenous Stopped 1/17/19 1641)       Imaging results:  No radiology results for the last day    Ambulatory status:   - up with assist    Social issues:   Social History     Socioeconomic History   • Marital status:      Spouse name: Jose   • Number of children: Not on file   • Years of education: Not on file   • Highest education level: Not on file   Social Needs   • Financial resource strain: Not on file   • Food insecurity - worry: Not on file   • Food insecurity - inability: Not on file   • Transportation needs - medical: Not on file   • Transportation needs - non-medical: Not on file   Occupational History     Employer: DISABLED   Tobacco Use   • Smoking status: Former Smoker     Packs/day: 0.00     Years: 0.00     Pack years: 0.00     Last attempt to quit: 12/17/2015     Years since quitting: 3.0   • Smokeless tobacco: Never Used   Substance and Sexual Activity   • Alcohol use: No   • Drug use: No   • Sexual activity: Defer   Other Topics Concern   • Not on file   Social History Narrative    Moved to [place] from [place]. She is currently medically disabled.          Izzy Morgan, RN  01/17/19 9987

## 2019-01-17 NOTE — ED PROVIDER NOTES
" EMERGENCY DEPARTMENT ENCOUNTER    CHIEF COMPLAINT  Chief Complaint: hyperglycemia   History given by: Patient   Room Number: 07/07  PMD: Blanca Pitts MD  GI: Dr. Coleman    HPI:  Pt is a 58 y.o. female  who presents complaining of hyperglycemia since yesterday. States her blood sugar has been showing \"too high to read\". Also reports vomiting, has not been able to tolerate PO intake. Pt is on sliding scale Novalog 3x daily and Lantis in the morning and at night. Has been taking all medications as prescribed. Last BM yesterday, normal. Denies dysuria, hematuria. No other complaints at this time.     Duration:  1 day  Onset: sudden  Timing: constant   Location: n/a  Radiation: n/a  Quality: n/a  Intensity/Severity: moderate to severe   Progression: worsening   Associated Symptoms: vomiting, decreased PO intake   Aggravating Factors: none reported   Alleviating Factors: none reported   Previous Episodes: Frequent 2/2 gastroparesis.   Treatment before arrival: none reported     PAST MEDICAL HISTORY  Active Ambulatory Problems     Diagnosis Date Noted   • Cirrhosis of liver (CMS/HCC) 03/08/2016   • Rheumatoid arthritis (CMS/HCC) 03/08/2016   • Anxiety and depression 03/08/2016   • Type 2 diabetes mellitus, uncontrolled, with neuropathy (CMS/HCC) 03/15/2016   • Fibrositis 03/15/2016   • Change in blood platelet count 03/15/2016   • Pancytopenia (CMS/HCC) 04/12/2016   • Hypersplenism 04/12/2016   • Nausea 06/14/2016   • DUKES (nonalcoholic steatohepatitis) 06/14/2016   • Hyperlipidemia    • Abdominal pain 02/14/2017   • Hematemesis 02/15/2017   • Ascites 02/21/2017   • Portal hypertension (CMS/HCC) 02/22/2017   • Systemic lupus (CMS/HCC) 02/22/2017   • Secondary esophageal varices without bleeding (CMS/HCC) 02/22/2017   • Gastroparesis 10/17/2017   • Cirrhosis of liver with ascites (CMS/HCC) 11/17/2017   • Diabetic ketoacidosis without coma associated with type 2 diabetes mellitus (CMS/HCC) 12/16/2017   • Diabetic " peripheral neuropathy (CMS/HCC) 12/17/2017   • History of seizure disorder 12/17/2017   • Hepatic encephalopathy (CMS/HCC) 05/08/2018   • Abnormal finding of blood chemistry  05/08/2018   • Thrombocytopenia (CMS/HCC) 05/16/2018   • Fever 05/17/2018   • Acute ITP (CMS/HCC) 05/18/2018   • Degeneration of lumbosacral intervertebral disc 05/30/2018   • Seizure (CMS/HCC) 05/30/2018   • Spondylosis without myelopathy 05/30/2018   • Intractable vomiting with nausea 10/03/2018   • UGI bleed 10/04/2018   • Migraine without aura 10/11/2018   • Urinary retention 10/11/2018   • Type 2 diabetes mellitus with hyperglycemia, with long-term current use of insulin (CMS/HCC) 12/13/2018   • BRICE (obstructive sleep apnea) 12/13/2018   • Gastroparesis 12/13/2018   • Hyperammonemia (CMS/HCC) 12/13/2018   • Hyponatremia 12/13/2018   • Anemia 12/13/2018   • Vitamin D insufficiency 12/16/2018     Resolved Ambulatory Problems     Diagnosis Date Noted   • Secondary esophageal varices with bleeding (CMS/HCC) 03/07/2017   • Upper GI bleed 04/25/2017     Past Medical History:   Diagnosis Date   • Anemia    • Anxiety    • Arthritis    • Broken shoulder    • Chromosomal abnormality    • Cirrhosis (CMS/HCC)    • Colon polyps    • Deafness    • Depression    • Diabetes mellitus (CMS/HCC)    • Duodenal ulcer with hemorrhage    • Esophageal varices determined by endoscopy (CMS/HCC)    • Fibromyalgia    • Gallbladder disease    • Gastric varices    • Gastroparesis    • Glaucoma    • Granuloma annulare    • H/O B12 deficiency    • H/O Bleeding ulcer    • H/O mixed connective tissue disorder    • Hemorrhoids    • Hiatal hernia    • History of transfusion    • Hx of being hospitalized    • Hyperlipidemia    • Migraine    • DUKES (nonalcoholic steatohepatitis) 11/2016   • BRICE (obstructive sleep apnea)    • Pancreas divisum    • Pancytopenia (CMS/HCC)    • Peptic ulcer disease    • PONV (postoperative nausea and vomiting)    • RA (rheumatoid arthritis)  "(CMS/HCC)    • Seizure disorder (CMS/HCC)    • Seizures (CMS/HCC)    • Systemic lupus (CMS/HCC)        PAST SURGICAL HISTORY  Past Surgical History:   Procedure Laterality Date   • BLADDER REPAIR  1991   • CATARACT EXTRACTION     • CHOLECYSTECTOMY  1994   • ENDOSCOPY N/A 11/7/2016    Procedure: ESOPHAGOGASTRODUODENOSCOPY WITH COLD POLYPECTOMY, BANDING,  AND CLIPS TIMES 2;  Surgeon: Rich Coleman MD;  Location: [facility] ENDOSCOPY;  Service:    • ENDOSCOPY N/A 12/22/2016    Procedure: ESOPHAGOGASTRODUODENOSCOPY WITH ESOPHAGEAL BANDING. 5 BANDS FIRED, 4 BANDS ADHERERD TO MUCOSA;  Surgeon: Rich Coleman MD;  Location: [facility] ENDOSCOPY;  Service:    • ENDOSCOPY N/A 2/15/2017    Procedure: ESOPHAGOGASTRODUODENOSCOPY AT BEDSIDE with esophageal varices banding (3);  Surgeon: Rich Coleman MD;  Location: [facility] ENDOSCOPY;  Service:    • ENDOSCOPY N/A 4/6/2017    Procedure: ESOPHAGOGASTRODUODENOSCOPY WITH ESOPHAGEAL VARICES BANDING x2;  Surgeon: Rich Coleman MD;  Location: [facility] ENDOSCOPY;  Service:    • ENDOSCOPY N/A 11/24/2017    Procedure: ESOPHAGOGASTRODUODENOSCOPY with biopsies;  Surgeon: Rich Coleman MD;  Location: [facility] ENDOSCOPY;  Service:    • ENDOSCOPY N/A 10/5/2018    Procedure: ESOPHAGOGASTRODUODENOSCOPY;  Surgeon: Rich Coleman MD;  Location: [facility] ENDOSCOPY;  Service: Gastroenterology   • ENDOSCOPY AND COLONOSCOPY  2014    Dr. Rich Coleman with findings of candida esophagitis   • EYE SURGERY     • HYSTERECTOMY  1986    partial   • JOINT REPLACEMENT     • KNEE SURGERY Right 1995    \"right knee recontstruction\"   • LIVER BIOPSY  01/2015   • MASS EXCISION     • STOMACH SURGERY         FAMILY HISTORY  Family History   Problem Relation Age of Onset   • Liver disease Other    • Depression Other    • Heart disease Other    • Hypertension Other    • Diabetes Other    • Breast cancer Other    • Brain cancer Other    • Uterine cancer Other    • Colon cancer Other    • Leukemia Other    • Colon " cancer Maternal Aunt    • Hypertension Mother    • Diabetes type II Mother    • Rheum arthritis Mother    • Liver disease Mother         DUKES   • Heart disease Father    • Diabetes type II Father    • Diabetes type II Sister    • Lupus Sister    • Malig Hyperthermia Neg Hx        SOCIAL HISTORY  Social History     Socioeconomic History   • Marital status:      Spouse name: Jose   • Number of children: Not on file   • Years of education: Not on file   • Highest education level: Not on file   Social Needs   • Financial resource strain: Not on file   • Food insecurity - worry: Not on file   • Food insecurity - inability: Not on file   • Transportation needs - medical: Not on file   • Transportation needs - non-medical: Not on file   Occupational History     Employer: DISABLED   Tobacco Use   • Smoking status: Former Smoker     Packs/day: 0.00     Years: 0.00     Pack years: 0.00     Last attempt to quit: 12/17/2015     Years since quitting: 3.0   • Smokeless tobacco: Never Used   Substance and Sexual Activity   • Alcohol use: No   • Drug use: No   • Sexual activity: Defer   Other Topics Concern   • Not on file   Social History Narrative    Moved to [place] from [place]. She is currently medically disabled.       ALLERGIES  Albuterol; Tramadol; Lactulose; and Quinine derivatives    REVIEW OF SYSTEMS  Review of Systems   Constitutional: Negative for fever.   HENT: Negative for sore throat.    Eyes: Negative.    Respiratory: Negative for cough and shortness of breath.    Cardiovascular: Negative for chest pain.   Gastrointestinal: Positive for nausea and vomiting. Negative for abdominal pain and diarrhea.   Endocrine:        + hyperglycemia   Genitourinary: Negative for dysuria.   Musculoskeletal: Negative for neck pain.   Skin: Negative for rash.   Neurological: Negative for weakness, numbness and headaches.   Hematological: Negative.    Psychiatric/Behavioral: Negative.    All other systems reviewed and are  negative.      PHYSICAL EXAM  ED Triage Vitals   Temp Heart Rate Resp BP SpO2   01/17/19 1229 01/17/19 1229 01/17/19 1255 01/17/19 1255 01/17/19 1229   96.7 °F (35.9 °C) 108 20 140/85 100 %      Temp src Heart Rate Source Patient Position BP Location FiO2 (%)   01/17/19 1229 01/17/19 1229 -- -- --   Tympanic Monitor          Physical Exam   Constitutional: She is oriented to person, place, and time. No distress.   HENT:   Head: Normocephalic and atraumatic.   Mouth/Throat: Mucous membranes are dry.   Eyes: EOM are normal. Pupils are equal, round, and reactive to light.   Neck: Normal range of motion. Neck supple.   Cardiovascular: Regular rhythm and normal heart sounds. Tachycardia present.   Pulmonary/Chest: Effort normal and breath sounds normal. No respiratory distress.   Abdominal: Soft. There is generalized tenderness. There is no rebound and no guarding.   Musculoskeletal: Normal range of motion. She exhibits no edema.   Neurological: She is alert and oriented to person, place, and time. She has normal sensation and normal strength.   Skin: Skin is warm and dry. No rash noted.   Psychiatric: Mood and affect normal.   Nursing note and vitals reviewed.      LAB RESULTS  Lab Results (last 24 hours)     Procedure Component Value Units Date/Time    POC Glucose Once [[id number]]  (Abnormal) Collected:  01/17/19 1233    Specimen:  Blood Updated:  01/17/19 1236     Glucose >599 mg/dL     CBC & Differential [[id number]] Collected:  01/17/19 1259    Specimen:  Blood Updated:  01/17/19 1411    Narrative:       The following orders were created for panel order CBC & Differential.  Procedure                               Abnormality         Status                     ---------                               -----------         ------                     CBC Auto Differential[[id number]]        Abnormal            Final result                 Please view results for these tests on the individual orders.    Comprehensive  Metabolic Panel [[id number]]  (Abnormal) Collected:  01/17/19 1259    Specimen:  Blood Updated:  01/17/19 1400     Glucose 633 mg/dL      BUN 17 mg/dL      Creatinine 1.13 mg/dL      Sodium 129 mmol/L      Potassium 4.1 mmol/L      Chloride 88 mmol/L      CO2 22.3 mmol/L      Calcium 9.6 mg/dL      Total Protein 8.0 g/dL      Albumin 4.70 g/dL      ALT (SGPT) 34 U/L      AST (SGOT) 28 U/L      Alkaline Phosphatase 237 U/L      Total Bilirubin 1.7 mg/dL      eGFR Non African Amer 50 mL/min/1.73      Globulin 3.3 gm/dL      A/G Ratio 1.4 g/dL      BUN/Creatinine Ratio 15.0     Anion Gap 18.7 mmol/L     Protime-INR [[id number]]  (Abnormal) Collected:  01/17/19 1259    Specimen:  Blood Updated:  01/17/19 1416     Protime 15.7 Seconds      INR 1.27    Lipase [[id number]]  (Abnormal) Collected:  01/17/19 1259    Specimen:  Blood Updated:  01/17/19 1347     Lipase 9 U/L     aPTT [[id number]]  (Normal) Collected:  01/17/19 1259    Specimen:  Blood Updated:  01/17/19 1416     PTT 31.4 seconds     Ketone Bodies, Serum (Not performed at [place]) [[id number]] Collected:  01/17/19 1259    Specimen:  Blood Updated:  01/17/19 1400    Narrative:       The following orders were created for panel order Ketone Bodies, Serum (Not performed at [place]).  Procedure                               Abnormality         Status                     ---------                               -----------         ------                     Beta Hydroxybutyrate Javier...[[id number]]  Abnormal            Final result                 Please view results for these tests on the individual orders.    Magnesium [[id number]]  (Normal) Collected:  01/17/19 1259    Specimen:  Blood Updated:  01/17/19 1347     Magnesium 1.9 mg/dL     Beta Hydroxybutyrate Quantitative [[id number]]  (Abnormal) Collected:  01/17/19 1259    Specimen:  Blood Updated:  01/17/19 1400     Beta-Hydroxybutyrate Quant 1.382 mmol/L     CBC Auto Differential [[id number]]  (Abnormal) Collected:   01/17/19 1259    Specimen:  Blood Updated:  01/17/19 1411     WBC 2.90 10*3/mm3      RBC 4.41 10*6/mm3      Hemoglobin 11.8 g/dL      Hematocrit 36.7 %      MCV 83.2 fL      MCH 26.8 pg      MCHC 32.2 g/dL      RDW 15.0 %      RDW-SD 45.8 fl      MPV 10.8 fL      Platelets 107 10*3/mm3      Neutrophil % 68.0 %      Lymphocyte % 17.9 %      Monocyte % 10.3 %      Eosinophil % 3.1 %      Basophil % 0.7 %      Immature Grans % 0.0 %      Neutrophils, Absolute 1.97 10*3/mm3      Lymphocytes, Absolute 0.52 10*3/mm3      Monocytes, Absolute 0.30 10*3/mm3      Eosinophils, Absolute 0.09 10*3/mm3      Basophils, Absolute 0.02 10*3/mm3      Immature Grans, Absolute 0.00 10*3/mm3     Ammonia [[id number]]  (Normal) Collected:  01/17/19 1335    Specimen:  Blood Updated:  01/17/19 1401     Ammonia 43 umol/L     POC Glucose Once [[id number]]  (Abnormal) Collected:  01/17/19 1419    Specimen:  Blood Updated:  01/17/19 1421     Glucose 594 mg/dL     Blood Gas, Arterial [[id number]]  (Abnormal) Collected:  01/17/19 1430    Specimen:  Arterial Blood Updated:  01/17/19 1433     Site Arterial: right radial     Mayur's Test Positive     pH, Arterial 7.437 pH units      pCO2, Arterial 33.4 mm Hg      pO2, Arterial 83.9 mm Hg      HCO3, Arterial 22.5 mmol/L      Base Excess, Arterial -1.2 mmol/L      O2 Saturation Calculated 96.7 %      Barometric Pressure for Blood Gas 752.9 mmHg      Modality Room Air     Rate 16 Breaths/minute     POC Glucose Once [[id number]]  (Abnormal) Collected:  01/17/19 1528    Specimen:  Blood Updated:  01/17/19 1530     Glucose 521 mg/dL           I ordered the above labs and reviewed the results    RADIOLOGY  No orders to display        I ordered the above noted radiological studies. Interpreted by radiologist.Reviewed by me in PACS.       PROCEDURES  Procedures      EKG          EKG time: 1350  Rhythm/Rate: NSR 93  P waves and OR: normal   QRS, axis: normal   ST and T waves: normal     Interpreted  Contemporaneously by me, independently viewed  Unchanged compared to prior 12/13/18    PROGRESS AND CONSULTS       1321: Informed pt of plan for labs, Zofran, insulin.     1552: Patient will require admission for further management.     1559: Spoke with Dr. Walsh who will admit the patient.     1603: Rechecked pt. Pt is resting comfortably.. Discussed the plan to admit the pt. Pt and family agree with the plan and all questions were addressed.      MEDICAL DECISION MAKING  Results were reviewed/discussed with the patient and they were also made aware of online access. Pt also made aware that some labs, such as cultures, will not be resulted during ER visit and follow up with PMD is necessary.     MDM  Number of Diagnoses or Management Options  APRIL (acute kidney injury) (CMS/HCC):   Gastroparesis:   Hyperglycemia:   Poorly controlled type 2 diabetes mellitus (CMS/HCC):      Amount and/or Complexity of Data Reviewed  Clinical lab tests: reviewed and ordered (Ammonia 43)  Tests in the medicine section of CPT®: reviewed and ordered (See EKG procedure note)  Decide to obtain previous medical records or to obtain history from someone other than the patient: yes  Discuss the patient with other providers: yes (Dr. Walsh)  Independent visualization of images, tracings, or specimens: yes           DIAGNOSIS  Final diagnoses:   Hyperglycemia   Poorly controlled type 2 diabetes mellitus (CMS/HCC)   APRIL (acute kidney injury) (CMS/HCC)   Gastroparesis       DISPOSITION  ADMISSION    Discussed treatment plan and reason for admission with pt/family and admitting physician.  Pt/family voiced understanding of the plan for admission for further testing/treatment as needed.         Latest Documented Vital Signs:  As of 4:03 PM  BP- 130/77 HR- 96 Temp- 96.7 °F (35.9 °C) (Tympanic) O2 sat- 97%    --  Documentation assistance provided by tori Marino for Dr. Rosa.  Information recorded by the tori was done at my  direction and has been verified and validated by me.              Elis Marino  01/17/19 1608       Daryn Rosa MD  01/17/19 6055

## 2019-01-17 NOTE — PROGRESS NOTES
Clinical Pharmacy Services: Medication History    Silvia Zabala is a 58 y.o. female presenting to  for   Chief Complaint   Patient presents with   • Hyperglycemia       She  has a past medical history of Anemia, Anxiety, Arthritis, Broken shoulder, Chromosomal abnormality, Cirrhosis (CMS/HCC), Colon polyps, Deafness, Depression, Diabetes mellitus (CMS/HCC), Duodenal ulcer with hemorrhage, Esophageal varices determined by endoscopy (CMS/HCC), Fibromyalgia, Gallbladder disease, Gastric varices, Gastroparesis, Glaucoma, Granuloma annulare, H/O B12 deficiency, H/O Bleeding ulcer, H/O mixed connective tissue disorder, Hemorrhoids, Hiatal hernia, History of transfusion, being hospitalized, Hyperlipidemia, Migraine, DUKES (nonalcoholic steatohepatitis) (11/2016), BRICE (obstructive sleep apnea), Pancreas divisum, Pancytopenia (CMS/HCC), Peptic ulcer disease, PONV (postoperative nausea and vomiting), RA (rheumatoid arthritis) (CMS/[facility]), Seizure disorder (CMS/[facility]), Seizures (CMS/[facility]), and Systemic lupus (CMS/[facility]).    Allergies as of 01/17/2019 - Reviewed 01/17/2019   Allergen Reaction Noted   • Albuterol Anaphylaxis 02/22/2016   • Tramadol Nausea Only, Other (See Comments), and GI Intolerance 03/08/2016   • Lactulose Nausea And Vomiting and Other (See Comments) 03/20/2018   • Quinine derivatives Nausea And Vomiting 02/22/2016       Medication information was obtained from: Patient  Pharmacy and Phone Number: [facility] [phone]    Prior to Admission Medications     Prescriptions Last Dose Informant Patient Reported? Taking?    ALPRAZolam (XANAX) 0.5 MG tablet  Self No Yes    Take 1 tablet by mouth 2 (Two) Times a Day As Needed for Anxiety.    amitriptyline (ELAVIL) 50 MG tablet  Self No Yes    Take 1 tablet by mouth Every Night.    cholecalciferol 1000 units tablet  Self No Yes    Take 1,000 Units by mouth Daily.    cyclobenzaprine (FLEXERIL) 5 MG tablet  Self Yes Yes    Take 5 mg by mouth every night  at bedtime.    DULoxetine (CYMBALTA) 30 MG capsule  Self Yes Yes    Take 90 mg by mouth Daily.    folic acid (FOLVITE) 1 MG tablet  Self Yes Yes    Take 1 mg by mouth Daily.    furosemide (LASIX) 40 MG tablet  Self No Yes    Take 1 tablet by mouth Daily.    hydrOXYzine (ATARAX) 25 MG tablet  Self Yes Yes    Take 50 mg by mouth 3 (Three) Times a Day As Needed for Itching.    insulin regular (HUMULIN R) 500 UNIT/ML CONCENTRATED injection  Self Yes Yes    Inject 65 Units under the skin into the appropriate area as directed 3 (Three) Times a Day Before Meals.    insulin regular (HUMULIN R) 500 UNIT/ML CONCENTRATED injection  Self Yes Yes    Inject 30-35 Units under the skin into the appropriate area as directed See Admin Instructions. Takes with snacks PRN    levETIRAcetam (KEPPRA) 500 MG tablet  Self Yes Yes    Take 500 mg by mouth 2 (Two) Times a Day.    Multiple Vitamins-Minerals (MULTIVITAMIN WITH MINERALS) tablet tablet  Self Yes Yes    Take 1 tablet by mouth Daily.    OxyCODONE HCl (OXAYDO) 7.5 MG tablet   Self Yes Yes    Take 7.5 mg by mouth Every 8 (Eight) Hours As Needed.    pantoprazole (PROTONIX) 40 MG EC tablet  Self Yes Yes    Take 40 mg by mouth Daily.    pregabalin (LYRICA) 75 MG capsule  Self Yes Yes    Take 75 mg by mouth 2 (Two) Times a Day.    promethazine (PHENERGAN) 25 MG tablet  Self Yes Yes    Take 25 mg by mouth Every 6 (Six) Hours As Needed for Nausea or Vomiting.    rifaximin (XIFAXAN) 550 MG tablet  Self Yes Yes    Take 550 mg by mouth 2 (Two) Times a Day.    spironolactone (ALDACTONE) 100 MG tablet  Self Yes Yes    Take 100 mg by mouth Daily.    trimethobenzamide (TIGAN) 300 MG capsule  Self Yes Yes    Take 300 mg by mouth 3 (Three) Times a Day.    vitamin B-12 (CYANOCOBALAMIN) 1000 MCG tablet  Self Yes Yes    Take 1,000 mcg by mouth daily.    Continuous Blood Gluc Sensor ([employer] YESIKA SENSOR SYSTEM)  Self No No    1 Device Every 14 (Fourteen) Days.    Insulin Glargine (LANTUS SOLOSTAR)  100 UNIT/ML injection pen  Self No No    Inject 32 Units under the skin into the appropriate area as directed Every 12 (Twelve) Hours.    insulin lispro (humaLOG) 100 UNIT/ML injection  Self No No    Inject 0-24 Units under the skin into the appropriate area as directed 4 (Four) Times a Day With Meals & at Bedtime for 30 days.            Medication notes: Patient was given a prescription after last admission for Lantus and Humalog. She was not able to  these medications at the pharmacy due to insurance issues. Has been using Humulin [id number] that she had previously at home.    This medication list is complete to the best of my knowledge as of 1/17/2019    Please call if questions.    Barbara Arriola, Medication History Technician  1/17/2019 5:42 PM

## 2019-01-17 NOTE — PLAN OF CARE
Problem: Patient Care Overview  Goal: Plan of Care Review  Outcome: Ongoing (interventions implemented as appropriate)   01/17/19 9423   Coping/Psychosocial   Plan of Care Reviewed With patient   Plan of Care Review   Progress no change   OTHER   Outcome Summary VSS. Admitted with hyperglycemia,BG greater than 600. Had some nausea and vomiting as well. Complains of constant pain due to arthritis. Started on clear liquids. SCD's applied. Will continue to monitor. 01/17/19       Problem: Hyperglycemia, Persistent (Adult)  Goal: Signs and Symptoms of Listed Potential Problems Will be Absent, Minimized or Managed (Hyperglycemia, Persistent)  Outcome: Ongoing (interventions implemented as appropriate)      Problem: Fall Risk (Adult)  Goal: Identify Related Risk Factors and Signs and Symptoms  Outcome: Ongoing (interventions implemented as appropriate)    Goal: Absence of Fall  Outcome: Ongoing (interventions implemented as appropriate)

## 2019-01-17 NOTE — H&P
"[facility] Admission H&P    Patient Care Team:  Blanca Pitts MD as PCP - General (Internal Medicine)  Sukhdeep Mcdowell MD as Consulting Physician (Hematology and Oncology)  Blanca Pitts MD as Referring Physician (Internal Medicine)  Rich Coleman MD as Consulting Physician (Gastroenterology)  Merary Burnett MD as Consulting Physician (Endocrinology)    Chief complaint: \"My blood sugar is high\", nausea and vomiting    History of Present Illness  This is a 58-year-old female with uncontrolled diabetes, gastroparesis, DUKES cirrhosis and numerous other problems as outlined below who presented to the emergency room today with a several week history of worsening nausea and vomiting.  This has particularly become worse over the past 3-4 days and she has also been having trouble controlling her blood sugars.  She states that her glucometer is now only reading \"high\".  She has chronic abdominal pain.  She has been urinating a lot.  She has been unable to hold down much in the way of anything for the past 2-3 days.  Her emesis is nonbilious and nonbloody.  She denies any fevers or chills.  No dysuria.  She has a history of admissions for hyperglycemia and subsequent gastroparesis, the most recent of which was about one month ago.  At that time it was noted that her insulin was adjusted from U500 to Lantus and mealtime insulin.  When I asked the patient about her current insulin regimen she is unable to give me a precise answer and demonstrates poor understanding of what she should currently be on.  She cites some sort of insurance issue regarding the Lantus and short acting insulin and ultimately states that due to this she has actually been on U500 for the past several weeks.      Past Medical History:   Diagnosis Date   • Anemia    • Anxiety    • Arthritis    • Broken shoulder     left shoulder    • Chromosomal abnormality     In the bone marrow of uncertain significance with additional material on chromosome 16 in all " 20 metaphases examined.   • Cirrhosis (CMS/HCC)    • Colon polyps    • Deafness    • Depression    • Diabetes mellitus (CMS/HCC)     Adult onset, insulin requiring   • Duodenal ulcer with hemorrhage    • Esophageal varices determined by endoscopy (CMS/HCC)    • Fibromyalgia    • Gallbladder disease    • Gastric varices    • Gastroparesis    • Glaucoma    • Granuloma annulare    • H/O B12 deficiency    • H/O Bleeding ulcer     And gastroesophageal varices   • H/O mixed connective tissue disorder    • Hemorrhoids    • Hiatal hernia    • History of transfusion    • Hx of being hospitalized     In [place] for GI bleeding from ulcer and varices   • Hyperlipidemia    • Migraine    • DUKES (nonalcoholic steatohepatitis) 11/2016   • BRICE (obstructive sleep apnea)    • Pancreas divisum    • Pancytopenia (CMS/HCC)     Chronic, due to cirrhosis and hypersplenism   • Peptic ulcer disease     And esophageal varices   • PONV (postoperative nausea and vomiting)    • RA (rheumatoid arthritis) (CMS/HCC)    • Seizure disorder (CMS/HCC)     LAST ONE SEVERAL YEARS AGO   • Seizures (CMS/HCC)    • Systemic lupus (CMS/HCC)      Past Surgical History:   Procedure Laterality Date   • BLADDER REPAIR  1991   • CATARACT EXTRACTION     • CHOLECYSTECTOMY  1994   • ENDOSCOPY N/A 11/7/2016    Procedure: ESOPHAGOGASTRODUODENOSCOPY WITH COLD POLYPECTOMY, BANDING,  AND CLIPS TIMES 2;  Surgeon: Rich Coleman MD;  Location: [facility] ENDOSCOPY;  Service:    • ENDOSCOPY N/A 12/22/2016    Procedure: ESOPHAGOGASTRODUODENOSCOPY WITH ESOPHAGEAL BANDING. 5 BANDS FIRED, 4 BANDS ADHERERD TO MUCOSA;  Surgeon: Rich Coleman MD;  Location: [facility] ENDOSCOPY;  Service:    • ENDOSCOPY N/A 2/15/2017    Procedure: ESOPHAGOGASTRODUODENOSCOPY AT BEDSIDE with esophageal varices banding (3);  Surgeon: Rich Coleman MD;  Location: [facility] ENDOSCOPY;  Service:    • ENDOSCOPY N/A 4/6/2017    Procedure: ESOPHAGOGASTRODUODENOSCOPY WITH ESOPHAGEAL VARICES BANDING x2;   "Surgeon: Rich Coleman MD;  Location: [facility] ENDOSCOPY;  Service:    • ENDOSCOPY N/A 11/24/2017    Procedure: ESOPHAGOGASTRODUODENOSCOPY with biopsies;  Surgeon: Rich Coleman MD;  Location: [facility] ENDOSCOPY;  Service:    • ENDOSCOPY N/A 10/5/2018    Procedure: ESOPHAGOGASTRODUODENOSCOPY;  Surgeon: Rich Coleman MD;  Location: [facility] ENDOSCOPY;  Service: Gastroenterology   • ENDOSCOPY AND COLONOSCOPY  2014    Dr. Rich Coleman with findings of candida esophagitis   • EYE SURGERY     • HYSTERECTOMY  1986    partial   • JOINT REPLACEMENT     • KNEE SURGERY Right 1995    \"right knee recontstruction\"   • LIVER BIOPSY  01/2015   • MASS EXCISION     • STOMACH SURGERY       Family History   Problem Relation Age of Onset   • Liver disease Other    • Depression Other    • Heart disease Other    • Hypertension Other    • Diabetes Other    • Breast cancer Other    • Brain cancer Other    • Uterine cancer Other    • Colon cancer Other    • Leukemia Other    • Colon cancer Maternal Aunt    • Hypertension Mother    • Diabetes type II Mother    • Rheum arthritis Mother    • Liver disease Mother         DUKES   • Heart disease Father    • Diabetes type II Father    • Diabetes type II Sister    • Lupus Sister    • Malig Hyperthermia Neg Hx      Social History     Tobacco Use   • Smoking status: Former Smoker     Packs/day: 0.00     Years: 0.00     Pack years: 0.00     Last attempt to quit: 12/17/2015     Years since quitting: 3.0   • Smokeless tobacco: Never Used   Substance Use Topics   • Alcohol use: No   • Drug use: No     Medications reviewed    Allergies:  Albuterol; Tramadol; Lactulose; and Quinine derivatives    Review of Systems   Constitutional: Negative for chills and fever.   HENT: Negative for congestion and sore throat.    Eyes: Negative for visual disturbance.   Respiratory: Negative for cough, chest tightness, shortness of breath and wheezing.    Cardiovascular: Negative for chest pain, palpitations and leg " swelling.   Gastrointestinal: Positive for abdominal pain, nausea and vomiting. Negative for abdominal distention and diarrhea.   Endocrine: Positive for polyuria. Negative for polydipsia.   Genitourinary: Negative for difficulty urinating, dysuria, frequency and urgency.   Musculoskeletal: Negative for arthralgias and myalgias.   Skin: Negative for color change and rash.   Neurological: Negative for dizziness and light-headedness.        PHYSICAL EXAM    Vital Signs  tMax 24 hrs:  Temp (24hrs), Av.7 °F (35.9 °C), Min:96.7 °F (35.9 °C), Max:96.7 °F (35.9 °C)    Vitals Ranges:  Temp:  [96.7 °F (35.9 °C)] 96.7 °F (35.9 °C)  Heart Rate:  [] 102  Resp:  [17-20] 18  BP: (109-140)/(73-85) 130/79    Physical Exam   Constitutional: She is oriented to person, place, and time. She appears well-developed and well-nourished.   Chronically ill-appearing.  She spends most of the interview lying back in bed with a sleeping mask over her eyes   HENT:   Head: Normocephalic and atraumatic.   Eyes: EOM are normal. Pupils are equal, round, and reactive to light.   Neck: Neck supple. No tracheal deviation present.   Cardiovascular: Regular rhythm. Exam reveals no gallop.   Murmur heard.  Mildly tachycardic   Pulmonary/Chest: Effort normal. No respiratory distress. She has no wheezes.   Abdominal: Soft. Bowel sounds are normal. She exhibits no distension. There is tenderness.   Mild generalized tenderness to palpation which the patient states is chronic   Musculoskeletal: She exhibits no edema or tenderness.   Neurological: She is alert and oriented to person, place, and time. No cranial nerve deficit.   Skin: Skin is warm and dry.   Nursing note and vitals reviewed.      Results Review:  Results from last 7 days   Lab Units 19  1259   WBC 10*3/mm3 2.90*   HEMOGLOBIN g/dL 11.8*   HEMATOCRIT % 36.7   PLATELETS 10*3/mm3 107*     Results from last 7 days   Lab Units 19  1259   SODIUM mmol/L 129*   POTASSIUM mmol/L 4.1    CHLORIDE mmol/L 88*   CO2 mmol/L 22.3   BUN mg/dL 17   CREATININE mg/dL 1.13*   CALCIUM mg/dL 9.6   BILIRUBIN mg/dL 1.7*   ALK PHOS U/L 237*   ALT (SGPT) U/L 34*   AST (SGOT) U/L 28   GLUCOSE mg/dL 633*        I reviewed the patient's new clinical results.        Active Hospital Problems    Diagnosis Date Noted   • **Hyperglycemia [R73.9] 01/17/2019   • Dehydration [E86.0] 01/17/2019   • Type 2 diabetes mellitus with hyperglycemia, with long-term current use of insulin (CMS/[facility]) [E11.65, Z79.4] 12/13/2018   • Gastroparesis [K31.84] 10/17/2017   • DUKES (nonalcoholic steatohepatitis) [K75.81] 06/14/2016   • Nausea & vomiting [R11.2] 06/14/2016      Resolved Hospital Problems   No resolved problems to display.       Assessment & Plan    The patient will be admitted.  She has received regular insulin in the emergency room and her blood sugar has come down to 435.  In speaking with her today it is very clear that she has very little insight into her medical problems and demonstrates lack of understanding regarding her insulin.  Coming away from the interview I have no idea what she has actually been taking at home.  I will ask her endocrinologist to come back and reevaluate her insulin regimen.  We will continue supportive care with IV fluids and antiemetics.  She has a flare of gastroparesis due to her uncontrolled diabetes and this will take time to resolve and not before we get her blood sugars better controlled.  I will ask her gastroenterologist to reevaluate her.  No signs or symptoms of infection.  We will check a urinalysis to rule out any underlying UTI.  Additional plans based on her clinical course.    I discussed the patients findings and my recommendations with patient and family    Wesly Walsh MD  01/17/19  4:41 PM

## 2019-01-18 LAB
ANION GAP SERPL CALCULATED.3IONS-SCNC: 11.9 MMOL/L
BASOPHILS # BLD AUTO: 0.01 10*3/MM3 (ref 0–0.2)
BASOPHILS NFR BLD AUTO: 0.5 % (ref 0–1.5)
BUN BLD-MCNC: 11 MG/DL (ref 6–20)
BUN/CREAT SERPL: 14.5 (ref 7–25)
CALCIUM SPEC-SCNC: 8.6 MG/DL (ref 8.6–10.5)
CHLORIDE SERPL-SCNC: 104 MMOL/L (ref 98–107)
CO2 SERPL-SCNC: 22.1 MMOL/L (ref 22–29)
CREAT BLD-MCNC: 0.76 MG/DL (ref 0.57–1)
DEPRECATED RDW RBC AUTO: 44.7 FL (ref 37–54)
EOSINOPHIL # BLD AUTO: 0.11 10*3/MM3 (ref 0–0.7)
EOSINOPHIL NFR BLD AUTO: 5.5 % (ref 0.3–6.2)
ERYTHROCYTE [DISTWIDTH] IN BLOOD BY AUTOMATED COUNT: 15 % (ref 11.7–13)
GFR SERPL CREATININE-BSD FRML MDRD: 79 ML/MIN/1.73
GLUCOSE BLD-MCNC: 157 MG/DL (ref 65–99)
GLUCOSE BLDC GLUCOMTR-MCNC: 136 MG/DL (ref 70–130)
GLUCOSE BLDC GLUCOMTR-MCNC: 161 MG/DL (ref 70–130)
GLUCOSE BLDC GLUCOMTR-MCNC: 181 MG/DL (ref 70–130)
GLUCOSE BLDC GLUCOMTR-MCNC: 201 MG/DL (ref 70–130)
GLUCOSE BLDC GLUCOMTR-MCNC: 283 MG/DL (ref 70–130)
GLUCOSE BLDC GLUCOMTR-MCNC: 314 MG/DL (ref 70–130)
HCT VFR BLD AUTO: 31.7 % (ref 35.6–45.5)
HGB BLD-MCNC: 10.3 G/DL (ref 11.9–15.5)
IMM GRANULOCYTES # BLD AUTO: 0 10*3/MM3 (ref 0–0.03)
IMM GRANULOCYTES NFR BLD AUTO: 0 % (ref 0–0.5)
LYMPHOCYTES # BLD AUTO: 0.57 10*3/MM3 (ref 0.9–4.8)
LYMPHOCYTES NFR BLD AUTO: 28.5 % (ref 19.6–45.3)
MCH RBC QN AUTO: 26.8 PG (ref 26.9–32)
MCHC RBC AUTO-ENTMCNC: 32.5 G/DL (ref 32.4–36.3)
MCV RBC AUTO: 82.3 FL (ref 80.5–98.2)
MONOCYTES # BLD AUTO: 0.23 10*3/MM3 (ref 0.2–1.2)
MONOCYTES NFR BLD AUTO: 11.5 % (ref 5–12)
NEUTROPHILS # BLD AUTO: 1.08 10*3/MM3 (ref 1.9–8.1)
NEUTROPHILS NFR BLD AUTO: 54 % (ref 42.7–76)
PLATELET # BLD AUTO: 68 10*3/MM3 (ref 140–500)
PMV BLD AUTO: 9.6 FL (ref 6–12)
POTASSIUM BLD-SCNC: 3.4 MMOL/L (ref 3.5–5.2)
RBC # BLD AUTO: 3.85 10*6/MM3 (ref 3.9–5.2)
SODIUM BLD-SCNC: 138 MMOL/L (ref 136–145)
WBC NRBC COR # BLD: 2 10*3/MM3 (ref 4.5–10.7)

## 2019-01-18 PROCEDURE — 99254 IP/OBS CNSLTJ NEW/EST MOD 60: CPT | Performed by: INTERNAL MEDICINE

## 2019-01-18 PROCEDURE — 25010000002 ONDANSETRON PER 1 MG: Performed by: INTERNAL MEDICINE

## 2019-01-18 PROCEDURE — 80048 BASIC METABOLIC PNL TOTAL CA: CPT | Performed by: INTERNAL MEDICINE

## 2019-01-18 PROCEDURE — 82962 GLUCOSE BLOOD TEST: CPT

## 2019-01-18 PROCEDURE — 85025 COMPLETE CBC W/AUTO DIFF WBC: CPT | Performed by: INTERNAL MEDICINE

## 2019-01-18 PROCEDURE — 63710000001 INSULIN LISPRO (HUMAN) PER 5 UNITS: Performed by: INTERNAL MEDICINE

## 2019-01-18 PROCEDURE — 99222 1ST HOSP IP/OBS MODERATE 55: CPT | Performed by: INTERNAL MEDICINE

## 2019-01-18 PROCEDURE — 63710000001 INSULIN GLARGINE PER 5 UNITS: Performed by: INTERNAL MEDICINE

## 2019-01-18 RX ORDER — POTASSIUM CHLORIDE 750 MG/1
40 CAPSULE, EXTENDED RELEASE ORAL ONCE
Status: COMPLETED | OUTPATIENT
Start: 2019-01-18 | End: 2019-01-18

## 2019-01-18 RX ADMIN — INSULIN LISPRO 3 UNITS: 100 INJECTION, SOLUTION INTRAVENOUS; SUBCUTANEOUS at 09:42

## 2019-01-18 RX ADMIN — CYCLOBENZAPRINE 5 MG: 10 TABLET, FILM COATED ORAL at 20:45

## 2019-01-18 RX ADMIN — POTASSIUM CHLORIDE 40 MEQ: 750 CAPSULE, EXTENDED RELEASE ORAL at 13:48

## 2019-01-18 RX ADMIN — TRIMETHOBENZAMIDE HYDROCHLORIDE 300 MG: 300 CAPSULE ORAL at 20:45

## 2019-01-18 RX ADMIN — LEVETIRACETAM 500 MG: 500 TABLET, FILM COATED ORAL at 20:45

## 2019-01-18 RX ADMIN — ONDANSETRON 4 MG: 2 INJECTION INTRAMUSCULAR; INTRAVENOUS at 21:25

## 2019-01-18 RX ADMIN — INSULIN LISPRO 6 UNITS: 100 INJECTION, SOLUTION INTRAVENOUS; SUBCUTANEOUS at 13:02

## 2019-01-18 RX ADMIN — TRIMETHOBENZAMIDE HYDROCHLORIDE 300 MG: 300 CAPSULE ORAL at 09:42

## 2019-01-18 RX ADMIN — INSULIN GLARGINE 32 UNITS: 100 INJECTION, SOLUTION SUBCUTANEOUS at 20:45

## 2019-01-18 RX ADMIN — ACETAMINOPHEN 650 MG: 325 TABLET, FILM COATED ORAL at 21:25

## 2019-01-18 RX ADMIN — ONDANSETRON 4 MG: 2 INJECTION INTRAMUSCULAR; INTRAVENOUS at 00:50

## 2019-01-18 RX ADMIN — INSULIN LISPRO 8 UNITS: 100 INJECTION, SOLUTION INTRAVENOUS; SUBCUTANEOUS at 20:46

## 2019-01-18 RX ADMIN — LEVETIRACETAM 500 MG: 500 TABLET, FILM COATED ORAL at 09:42

## 2019-01-18 RX ADMIN — INSULIN LISPRO 6 UNITS: 100 INJECTION, SOLUTION INTRAVENOUS; SUBCUTANEOUS at 09:43

## 2019-01-18 RX ADMIN — INSULIN GLARGINE 32 UNITS: 100 INJECTION, SOLUTION SUBCUTANEOUS at 09:43

## 2019-01-18 RX ADMIN — TRIMETHOBENZAMIDE HYDROCHLORIDE 300 MG: 300 CAPSULE ORAL at 17:45

## 2019-01-18 RX ADMIN — INSULIN LISPRO 6 UNITS: 100 INJECTION, SOLUTION INTRAVENOUS; SUBCUTANEOUS at 17:45

## 2019-01-18 RX ADMIN — INSULIN LISPRO 5 UNITS: 100 INJECTION, SOLUTION INTRAVENOUS; SUBCUTANEOUS at 13:02

## 2019-01-18 RX ADMIN — ONDANSETRON 4 MG: 2 INJECTION INTRAMUSCULAR; INTRAVENOUS at 15:12

## 2019-01-18 RX ADMIN — PANTOPRAZOLE SODIUM 40 MG: 40 TABLET, DELAYED RELEASE ORAL at 09:42

## 2019-01-18 RX ADMIN — ACETAMINOPHEN 650 MG: 325 TABLET, FILM COATED ORAL at 09:42

## 2019-01-18 RX ADMIN — DULOXETINE HYDROCHLORIDE 90 MG: 60 CAPSULE, DELAYED RELEASE ORAL at 09:42

## 2019-01-18 RX ADMIN — RIFAXIMIN 550 MG: 550 TABLET ORAL at 20:45

## 2019-01-18 RX ADMIN — ALPRAZOLAM 0.5 MG: 0.5 TABLET ORAL at 21:25

## 2019-01-18 RX ADMIN — PREGABALIN 75 MG: 75 CAPSULE ORAL at 09:42

## 2019-01-18 RX ADMIN — SODIUM CHLORIDE, PRESERVATIVE FREE 3 ML: 5 INJECTION INTRAVENOUS at 20:48

## 2019-01-18 RX ADMIN — SODIUM CHLORIDE, PRESERVATIVE FREE 3 ML: 5 INJECTION INTRAVENOUS at 09:46

## 2019-01-18 RX ADMIN — INSULIN LISPRO 16 UNITS: 100 INJECTION, SOLUTION INTRAVENOUS; SUBCUTANEOUS at 00:49

## 2019-01-18 RX ADMIN — FOLIC ACID 1000 MCG: 1 TABLET ORAL at 09:42

## 2019-01-18 RX ADMIN — PREGABALIN 75 MG: 75 CAPSULE ORAL at 20:45

## 2019-01-18 RX ADMIN — AMITRIPTYLINE HYDROCHLORIDE 50 MG: 50 TABLET, FILM COATED ORAL at 20:45

## 2019-01-18 RX ADMIN — ACETAMINOPHEN 650 MG: 325 TABLET, FILM COATED ORAL at 15:12

## 2019-01-18 RX ADMIN — RIFAXIMIN 550 MG: 550 TABLET ORAL at 09:42

## 2019-01-18 RX ADMIN — ONDANSETRON 4 MG: 2 INJECTION INTRAMUSCULAR; INTRAVENOUS at 09:42

## 2019-01-18 NOTE — CONSULTS
[facility]/[place]              Initial Inpatient Consult Note  Referring Provider: Nico  Reason for Consultation: Gastroparesis    Subjective     History of present illness:  Patient is a 58-year-old white female with severe poorly controlled diabetes and end-stage liver disease secondary to nonalcoholic steatohepatitis.  She has also been prone to severe superimposed illnesses such as severe thrombocytopenia, rheumatoid arthritis, and chronic pancytopenia.  It is not unusual for her to have blood glucoses in excess of 3-400.  When they get high enough or long enough, she typically develops severe nausea and vomiting which ultimately leads to hospitalization.  She is now in such a hospitalization.  She has gotten better with control of her diabetes, says that her nausea and vomiting have resolved for now and that her appetite is picking back up.  She had been on metoclopramide but this had to be discontinued when she developed tardive dyskinesia.  She is also tried domperidone and has found this ineffective.    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   • Cirrhosis (CMS/HCC)    • Colon polyps    • Deafness    • Depression    • Diabetes mellitus (CMS/HCC)     Adult onset, insulin requiring   • Duodenal ulcer with hemorrhage    • Esophageal varices determined by endoscopy (CMS/HCC)    • Fibromyalgia    • Gallbladder disease    • Gastric varices    • Gastroparesis    • Glaucoma    • Granuloma annulare    • H/O B12 deficiency    • H/O Bleeding ulcer     And gastroesophageal varices   • H/O mixed connective tissue disorder    • Hemorrhoids    • Hiatal hernia    • History of transfusion    • Hx of being hospitalized     In [place] for GI  "bleeding from ulcer and varices   • Hyperlipidemia    • Migraine    • DUKES (nonalcoholic steatohepatitis) 11/2016   • BRICE (obstructive sleep apnea)    • Pancreas divisum    • Pancytopenia (CMS/HCC)     Chronic, due to cirrhosis and hypersplenism   • Peptic ulcer disease     And esophageal varices   • PONV (postoperative nausea and vomiting)    • RA (rheumatoid arthritis) (CMS/HCC)    • Seizure disorder (CMS/HCC)     LAST ONE SEVERAL YEARS AGO   • Seizures (CMS/HCC)    • Systemic lupus (CMS/HCC)        Past Surgical History:  Past Surgical History:   Procedure Laterality Date   • BLADDER REPAIR  1991   • CATARACT EXTRACTION     • CHOLECYSTECTOMY  1994   • ENDOSCOPY N/A 11/7/2016    Procedure: ESOPHAGOGASTRODUODENOSCOPY WITH COLD POLYPECTOMY, BANDING,  AND CLIPS TIMES 2;  Surgeon: Rich Coleman MD;  Location: [facility] ENDOSCOPY;  Service:    • ENDOSCOPY N/A 12/22/2016    Procedure: ESOPHAGOGASTRODUODENOSCOPY WITH ESOPHAGEAL BANDING. 5 BANDS FIRED, 4 BANDS ADHERERD TO MUCOSA;  Surgeon: Rich Coleman MD;  Location: [facility] ENDOSCOPY;  Service:    • ENDOSCOPY N/A 2/15/2017    Procedure: ESOPHAGOGASTRODUODENOSCOPY AT BEDSIDE with esophageal varices banding (3);  Surgeon: Rich Coleman MD;  Location: [facility] ENDOSCOPY;  Service:    • ENDOSCOPY N/A 4/6/2017    Procedure: ESOPHAGOGASTRODUODENOSCOPY WITH ESOPHAGEAL VARICES BANDING x2;  Surgeon: Rich Coleman MD;  Location: [facility] ENDOSCOPY;  Service:    • ENDOSCOPY N/A 11/24/2017    Procedure: ESOPHAGOGASTRODUODENOSCOPY with biopsies;  Surgeon: Rich Coleman MD;  Location: [facility] ENDOSCOPY;  Service:    • ENDOSCOPY N/A 10/5/2018    Procedure: ESOPHAGOGASTRODUODENOSCOPY;  Surgeon: Rich Coleman MD;  Location: [facility] ENDOSCOPY;  Service: Gastroenterology   • ENDOSCOPY AND COLONOSCOPY  2014    Dr. Rich Coleman with findings of candida esophagitis   • EYE SURGERY     • HYSTERECTOMY  1986    partial   • JOINT REPLACEMENT     • KNEE SURGERY Right 1995    \"right " "knee recontstruction\"   • LIVER BIOPSY  01/2015   • MASS EXCISION     • STOMACH SURGERY          Social History:   Social History     Tobacco Use   • Smoking status: Former Smoker     Packs/day: 0.00     Years: 0.00     Pack years: 0.00     Last attempt to quit: 12/17/2015     Years since quitting: 3.0   • Smokeless tobacco: Never Used   Substance Use Topics   • Alcohol use: No        Family History:  Family History   Problem Relation Age of Onset   • Liver disease Other    • Depression Other    • Heart disease Other    • Hypertension Other    • Diabetes Other    • Breast cancer Other    • Brain cancer Other    • Uterine cancer Other    • Colon cancer Other    • Leukemia Other    • Colon cancer Maternal Aunt    • Hypertension Mother    • Diabetes type II Mother    • Rheum arthritis Mother    • Liver disease Mother         DUKES   • Heart disease Father    • Diabetes type II Father    • Diabetes type II Sister    • Lupus Sister    • Malig Hyperthermia Neg Hx        Home Meds:  Medications Prior to Admission   Medication Sig Dispense Refill Last Dose   • ALPRAZolam (XANAX) 0.5 MG tablet Take 1 tablet by mouth 2 (Two) Times a Day As Needed for Anxiety. 60 tablet 1 Taking   • amitriptyline (ELAVIL) 50 MG tablet Take 1 tablet by mouth Every Night. 30 tablet 0 Taking   • cholecalciferol 1000 units tablet Take 1,000 Units by mouth Daily.   Taking   • cyclobenzaprine (FLEXERIL) 5 MG tablet Take 5 mg by mouth every night at bedtime.   Taking   • DULoxetine (CYMBALTA) 30 MG capsule Take 90 mg by mouth Daily.   Taking   • folic acid (FOLVITE) 1 MG tablet Take 1 mg by mouth Daily.      • furosemide (LASIX) 40 MG tablet Take 1 tablet by mouth Daily. 30 tablet 2 Taking   • hydrOXYzine (ATARAX) 25 MG tablet Take 50 mg by mouth 3 (Three) Times a Day As Needed for Itching.   Taking   • insulin regular (HUMULIN R) 500 UNIT/ML CONCENTRATED injection Inject 65 Units under the skin into the appropriate area as directed 3 (Three) Times a " Day Before Meals.      • insulin regular (HUMULIN R) 500 UNIT/ML CONCENTRATED injection Inject 30-35 Units under the skin into the appropriate area as directed See Admin Instructions. Takes with snacks PRN      • levETIRAcetam (KEPPRA) 500 MG tablet Take 500 mg by mouth 2 (Two) Times a Day.      • Multiple Vitamins-Minerals (MULTIVITAMIN WITH MINERALS) tablet tablet Take 1 tablet by mouth Daily.   Taking   • OxyCODONE HCl (OXAYDO) 7.5 MG tablet  Take 7.5 mg by mouth Every 8 (Eight) Hours As Needed.   Taking   • pantoprazole (PROTONIX) 40 MG EC tablet Take 40 mg by mouth Daily.   Taking   • pregabalin (LYRICA) 75 MG capsule Take 75 mg by mouth 2 (Two) Times a Day.   Taking   • promethazine (PHENERGAN) 25 MG tablet Take 25 mg by mouth Every 6 (Six) Hours As Needed for Nausea or Vomiting.   Taking   • rifaximin (XIFAXAN) 550 MG tablet Take 550 mg by mouth 2 (Two) Times a Day.   Taking   • spironolactone (ALDACTONE) 100 MG tablet Take 100 mg by mouth Daily.   Taking   • trimethobenzamide (TIGAN) 300 MG capsule Take 300 mg by mouth 3 (Three) Times a Day.   Taking   • vitamin B-12 (CYANOCOBALAMIN) 1000 MCG tablet Take 1,000 mcg by mouth daily.   Taking   • Continuous Blood Gluc Sensor (FREESTYLE YESIKA SENSOR SYSTEM) 1 Device Every 14 (Fourteen) Days. 2 each 3 Taking   • Insulin Glargine (LANTUS SOLOSTAR) 100 UNIT/ML injection pen Inject 32 Units under the skin into the appropriate area as directed Every 12 (Twelve) Hours. 10 pen 11    • insulin lispro (humaLOG) 100 UNIT/ML injection Inject 0-24 Units under the skin into the appropriate area as directed 4 (Four) Times a Day With Meals & at Bedtime for 30 days. 30 mL 11        Current Meds:     amitriptyline 50 mg Oral Nightly   DULoxetine 90 mg Oral Daily   folic acid 1,000 mcg Oral Daily   insulin glargine 32 Units Subcutaneous Q12H   insulin lispro 0-14 Units Subcutaneous 4x Daily With Meals & Nightly   insulin lispro 6 Units Subcutaneous TID With Meals   levETIRAcetam  "500 mg Oral BID   pantoprazole 40 mg Oral Daily   pregabalin 75 mg Oral Q12H   rifaximin 550 mg Oral BID   sodium chloride 3 mL Intravenous Q12H   trimethobenzamide 300 mg Oral TID       Allergies:  Allergies   Allergen Reactions   • Albuterol Anaphylaxis   • Tramadol Nausea Only, Other (See Comments) and GI Intolerance     Per pt causes her \"palsy, meaning shaking and tremors\"    • Lactulose Nausea And Vomiting and Other (See Comments)     Severe abdominal pain   • Quinine Derivatives Nausea And Vomiting       Review of Systems  Pertinent items are noted in HPI, all other systems reviewed and negative    Objective     Vital Signs  Temp:  [97.5 °F (36.4 °C)-98.3 °F (36.8 °C)] 98.3 °F (36.8 °C)  Heart Rate:  [] 101  Resp:  [18] 18  BP: (117-132)/(56-72) 128/72    Physical Exam:   She appears chronically ill and cachectic but she is in no acute distress.     Lids and conjunctivae are pale.  Extraocular motions intact.   Examination of the ears is grossly normal and her hearing is intact.   Neck is supple.  No gross thyromegaly.   Chest clear.  No respiratory distress.   Abdominal examination demonstrates mild tenderness throughout.  This is unchanged from prior examinations.  She does have splenomegaly.  There is no obvious ascites.   No palpable lymphadenopathy of the neck or groin.   Scattered ecchymoses about the skin but no crepitus.   She is alert and oriented.  She does not have asterixis today.    Results Review:   I reviewed the patient's new clinical results.    WBC No results found for: WBCS   HGB Hemoglobin   Date Value Ref Range Status   01/18/2019 10.3 (L) 11.9 - 15.5 g/dL Final   01/17/2019 11.8 (L) 11.9 - 15.5 g/dL Final      HCT Hematocrit   Date Value Ref Range Status   01/18/2019 31.7 (L) 35.6 - 45.5 % Final   01/17/2019 36.7 35.6 - 45.5 % Final      Platelets No results found for: LABPLAT   MCV MCV   Date Value Ref Range Status   01/18/2019 82.3 80.5 - 98.2 fL Final   01/17/2019 83.2 80.5 - " 98.2 fL Final          Sodium Sodium   Date Value Ref Range Status   01/18/2019 138 136 - 145 mmol/L Final   01/17/2019 129 (L) 136 - 145 mmol/L Final      Potassium Potassium   Date Value Ref Range Status   01/18/2019 3.4 (L) 3.5 - 5.2 mmol/L Final   01/17/2019 4.1 3.5 - 5.2 mmol/L Final      Chloride Chloride   Date Value Ref Range Status   01/18/2019 104 98 - 107 mmol/L Final   01/17/2019 88 (L) 98 - 107 mmol/L Final      CO2 CO2   Date Value Ref Range Status   01/18/2019 22.1 22.0 - 29.0 mmol/L Final   01/17/2019 22.3 22.0 - 29.0 mmol/L Final      BUN BUN   Date Value Ref Range Status   01/18/2019 11 6 - 20 mg/dL Final   01/17/2019 17 6 - 20 mg/dL Final      Creatinine Creatinine   Date Value Ref Range Status   01/18/2019 0.76 0.57 - 1.00 mg/dL Final   01/17/2019 1.13 (H) 0.57 - 1.00 mg/dL Final      Calcium Calcium   Date Value Ref Range Status   01/18/2019 8.6 8.6 - 10.5 mg/dL Final   01/17/2019 9.6 8.6 - 10.5 mg/dL Final      Albumin Albumin   Date Value Ref Range Status   01/17/2019 4.70 3.50 - 5.20 g/dL Final      AST  ALT  PT/INR:   AST (SGOT)   Date Value Ref Range Status   01/17/2019 28 1 - 32 U/L Final     ALT (SGPT)   Date Value Ref Range Status   01/17/2019 34 (H) 1 - 33 U/L Final     Protime   Date Value Ref Range Status   01/17/2019 15.7 (H) 11.7 - 14.2 Seconds Final   /  INR   Date Value Ref Range Status   01/17/2019 1.27 (H) 0.90 - 1.10 Final            Imaging Results (last 72 hours)     ** No results found for the last 72 hours. **          Assessment/Plan       Hyperglycemia    Nausea & vomiting    DUKES (nonalcoholic steatohepatitis)    Gastroparesis    Type 2 diabetes mellitus with hyperglycemia, with long-term current use of insulin (CMS/HCC)    Dehydration      Severe gastroparesis which typically flares up when her blood sugars are high enough.  Unfortunately all we can do when this happens is to tune her up metabolically as best we can.  She cannot go back on metoclopramide because of  neurologic side effects.  Domperidone is ineffective.  Even IV erythromycin is on backorder and has been so for about a year and simply is not available.  In any case she appears to be improving well so hopefully nothing or will need to be done this hospitalization.    I discussed the patients findings and my recommendations with patient    Rich Coleman MD  [facility]  01/18/19  6:32 PM

## 2019-01-18 NOTE — PROGRESS NOTES
Discharge Planning Assessment  [facility]     Patient Name: Silvia Zabala  MRN: [id number]  Today's Date: 1/18/2019    Admit Date: 1/17/2019    Discharge Needs Assessment     Row Name 01/18/19 0819       Living Environment    Lives With  spouse    Current Living Arrangements  home/apartment/condo    Primary Care Provided by  self    Provides Primary Care For  no one    Family Caregiver if Needed  spouse    Family Caregiver Names   Jose Zabala    Quality of Family Relationships  helpful    Able to Return to Prior Arrangements  yes       Resource/Environmental Concerns    Resource/Environmental Concerns  none       Transition Planning    Patient/Family Anticipates Transition to  home with family    Patient/Family Anticipated Services at Transition  none    Transportation Anticipated  family or friend will provide       Discharge Needs Assessment    Readmission Within the Last 30 Days  no previous admission in last 30 days    Concerns to be Addressed  denies needs/concerns at this time    Equipment Currently Used at Home  glucometer;grab bar;cane, straight    Anticipated Changes Related to Illness  none    Equipment Needed After Discharge  none        Discharge Plan     Row Name 01/18/19 0822       Plan    Plan  Return home with     Patient/Family in Agreement with Plan  yes    Plan Comments  Spoke with patient at bedside.  Patient lives with  Jose [phone], is IADL, states she uses a cane sometimes when her rheumatoid arthritis bothers her.  She has a glucometer and grab bars, she has never used HH.  Patient plans to return home at DC and does not anticipate any DC needs.  CCP will follow as needed.  BHumeniuk RN         Demographic Summary     Row Name 01/18/19 0818       General Information    Admission Type  inpatient    Arrived From  home    Referral Source  admission list    Reason for Consult  discharge planning    Preferred Language  English     Used During This  Interaction  no        Functional Status     Row Name 01/18/19 0818       Functional Status    Usual Activity Tolerance  moderate    Current Activity Tolerance  moderate       Functional Status, IADL    Medications  independent    Meal Preparation  independent;assistive person    Housekeeping  assistive person;independent    Laundry  independent;assistive person    Shopping  assistive person       Mental Status    General Appearance WDL  WDL       Mental Status Summary    Recent Changes in Mental Status/Cognitive Functioning  no changes                Becky S. Humeniuk, RN

## 2019-01-18 NOTE — CONSULTS
58 y.o.  Patient Care Team:  Blanca Pitts MD as PCP - General (Internal Medicine)  Sukhdeep Mcdowell MD as Consulting Physician (Hematology and Oncology)  Blanca Pitts MD as Referring Physician (Internal Medicine)  Rich Coleman MD as Consulting Physician (Gastroenterology)  Merary Burnett MD as Consulting Physician (Endocrinology)      Chief Complaint   Patient presents with   • Hyperglycemia       HPI  58-year-old white female with past medical history as mentioned below presents with severe hyperglycemia, nausea, chronic abdominal pain, hepatic encephalopathy.  She has been recently hospitalized in December 2018.  Endocrine has been consulted for elevated blood sugars.    Type 2 diabetes mellitus  Diagnosed in her 20s.  Patient was supposed to be on U-500 insulin, due to her variable oral intake due to the gastroparesis U5 100 has been discontinued during her last hospitalization and patient was supposed to be on Lantus and NovoLog.  However due to insurance issues patient could not refill the prescription of Lantus and NovoLog and has started using U-500 insulin again.  She has noted that her blood sugars have been extremely variable from U - 500 insulin.  At home when she was on Lantus 32 units twice daily, NovoLog 16 units with each meal and her blood sugars were decently controlled most of them running in 200s range.  However within the last 1 week her blood sugars have been severely elevated into 300s-400s range as well.  Last eye examination was in January 2018 and no history of diabetic retinopathy.  She does have history of diabetic neuropathy.  She does have uncontrolled gastroparesis, whose control is extremely difficult due to her variable blood sugars and she is not a candidate for gastric stimulator due to her splenomegaly and also ITP.    Tried dexcom, blood glucose sensor to help intensify the insulin regimen for the patient but that was not approved by the insurance.  Gave the glucose  sensor as a sample from our office, but patient is not able to afford the sensor supplies.     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   • Cirrhosis (CMS/HCC)    • Colon polyps    • Deafness    • Depression    • Diabetes mellitus (CMS/HCC)     Adult onset, insulin requiring   • Duodenal ulcer with hemorrhage    • Esophageal varices determined by endoscopy (CMS/HCC)    • Fibromyalgia    • Gallbladder disease    • Gastric varices    • Gastroparesis    • Glaucoma    • Granuloma annulare    • H/O B12 deficiency    • H/O Bleeding ulcer     And gastroesophageal varices   • H/O mixed connective tissue disorder    • Hemorrhoids    • Hiatal hernia    • History of transfusion    • Hx of being hospitalized     In [place] for GI bleeding from ulcer and varices   • Hyperlipidemia    • Migraine    • DUKES (nonalcoholic steatohepatitis) 11/2016   • BRICE (obstructive sleep apnea)    • Pancreas divisum    • Pancytopenia (CMS/HCC)     Chronic, due to cirrhosis and hypersplenism   • Peptic ulcer disease     And esophageal varices   • PONV (postoperative nausea and vomiting)    • RA (rheumatoid arthritis) (CMS/HCC)    • Seizure disorder (CMS/HCC)     LAST ONE SEVERAL YEARS AGO   • Seizures (CMS/HCC)    • Systemic lupus (CMS/HCC)        Family History   Problem Relation Age of Onset   • Liver disease Other    • Depression Other    • Heart disease Other    • Hypertension Other    • Diabetes Other    • Breast cancer Other    • Brain cancer Other    • Uterine cancer Other    • Colon cancer Other    • Leukemia Other    • Colon cancer Maternal Aunt    • Hypertension Mother    • Diabetes type II Mother    • Rheum arthritis Mother    • Liver disease Mother         DUKES   • Heart disease Father    • Diabetes type II Father  "   • Diabetes type II Sister    • Lupus Sister    • Malig Hyperthermia Neg Hx        Social History     Socioeconomic History   • Marital status:      Spouse name: Jose   • Number of children: Not on file   • Years of education: Not on file   • Highest education level: Not on file   Social Needs   • Financial resource strain: Not on file   • Food insecurity - worry: Not on file   • Food insecurity - inability: Not on file   • Transportation needs - medical: Not on file   • Transportation needs - non-medical: Not on file   Occupational History     Employer: DISABLED   Tobacco Use   • Smoking status: Former Smoker     Packs/day: 0.00     Years: 0.00     Pack years: 0.00     Last attempt to quit: 12/17/2015     Years since quitting: 3.0   • Smokeless tobacco: Never Used   Substance and Sexual Activity   • Alcohol use: No   • Drug use: No   • Sexual activity: Defer   Other Topics Concern   • Not on file   Social History Narrative    Moved to [place] from [place]. She is currently medically disabled.       Allergies   Allergen Reactions   • Albuterol Anaphylaxis   • Tramadol Nausea Only, Other (See Comments) and GI Intolerance     Per pt causes her \"palsy, meaning shaking and tremors\"    • Lactulose Nausea And Vomiting and Other (See Comments)     Severe abdominal pain   • Quinine Derivatives Nausea And Vomiting         Current Facility-Administered Medications:   •  acetaminophen (TYLENOL) tablet 650 mg, 650 mg, Oral, Q4H PRN, Wesly Walsh MD, 650 mg at 01/18/19 0942  •  ALPRAZolam (XANAX) tablet 0.5 mg, 0.5 mg, Oral, BID PRN, Wesly Walsh MD, 0.5 mg at 01/17/19 2115  •  amitriptyline (ELAVIL) tablet 50 mg, 50 mg, Oral, Nightly, Wesly Walsh MD, 50 mg at 01/17/19 2003  •  cyclobenzaprine (FLEXERIL) tablet 5 mg, 5 mg, Oral, Nightly PRN, Wesly Walsh MD, 5 mg at 01/17/19 2115  •  dextrose (D50W) 25 g/ 50mL Intravenous Solution 25 g, 25 g, Intravenous, " Q15 Min PRN, Wesly Walsh MD  •  dextrose (GLUTOSE) oral gel 15 g, 15 g, Oral, Q15 Min PRN, Wesly Walsh MD  •  DULoxetine (CYMBALTA) DR capsule 90 mg, 90 mg, Oral, Daily, Wesly Walsh MD, 90 mg at 01/18/19 0942  •  folic acid (FOLVITE) tablet 1,000 mcg, 1,000 mcg, Oral, Daily, Wesly Walsh MD, 1,000 mcg at 01/18/19 0942  •  glucagon (human recombinant) (GLUCAGEN DIAGNOSTIC) injection 1 mg, 1 mg, Subcutaneous, PRN, Wesly Walsh MD  •  insulin glargine (LANTUS) injection 32 Units, 32 Units, Subcutaneous, Q12H, Merary Burnett MD, 32 Units at 01/18/19 0943  •  insulin lispro (humaLOG) injection 0-14 Units, 0-14 Units, Subcutaneous, 4x Daily With Meals & Nightly, Wesly Walsh MD, 5 Units at 01/18/19 1302  •  insulin lispro (humaLOG) injection 6 Units, 6 Units, Subcutaneous, TID With Meals, Merary Burnett MD, 6 Units at 01/18/19 1302  •  levETIRAcetam (KEPPRA) tablet 500 mg, 500 mg, Oral, BID, Wesly Walsh MD, 500 mg at 01/18/19 0942  •  ondansetron (ZOFRAN) injection 4 mg, 4 mg, Intravenous, Q6H PRN, Wesly Walsh MD, 4 mg at 01/18/19 0942  •  pantoprazole (PROTONIX) EC tablet 40 mg, 40 mg, Oral, Daily, Wesly Walsh MD, 40 mg at 01/18/19 0942  •  pregabalin (LYRICA) capsule 75 mg, 75 mg, Oral, Q12H, Wesly Walsh MD, 75 mg at 01/18/19 0942  •  rifaximin (XIFAXAN) tablet 550 mg, 550 mg, Oral, BID, Wesly Walsh MD, 550 mg at 01/18/19 0942  •  [COMPLETED] Insert peripheral IV, , , Once **AND** sodium chloride 0.9 % flush 10 mL, 10 mL, Intravenous, PRN, Daryn Rosa MD  •  sodium chloride 0.9 % flush 3 mL, 3 mL, Intravenous, Q12H, Wesly Walsh MD, 3 mL at 01/18/19 0946  •  sodium chloride 0.9 % flush 3-10 mL, 3-10 mL, Intravenous, PRN, Wesly Walsh MD  •  sodium chloride 0.9 % infusion, 75 mL/hr, Intravenous, Continuous, Wesly Walsh  MD Jarrod, Last Rate: 75 mL/hr at 01/18/19 1300, 75 mL/hr at 01/18/19 1300  •  trimethobenzamide (TIGAN) capsule 300 mg, 300 mg, Oral, TID, Wesly Walsh MD, 300 mg at 01/18/19 0942         Review of Systems   Constitutional: Positive for appetite change and fatigue. Negative for fever.   Respiratory: Negative for shortness of breath.    Cardiovascular: Negative for chest pain.   Gastrointestinal: Positive for abdominal distention and nausea. Negative for diarrhea and vomiting.   Endocrine: Negative for polydipsia and polyuria.   Genitourinary: Negative for flank pain.   Musculoskeletal: Negative for arthralgias and myalgias.   Skin: Negative for pallor.   Neurological: Negative for weakness and numbness.   Psychiatric/Behavioral: Negative for agitation.     Objective     Vital Signs  Temp:  [97.5 °F (36.4 °C)-98.4 °F (36.9 °C)] 98.3 °F (36.8 °C)  Heart Rate:  [] 101  Resp:  [17-18] 18  BP: (117-132)/(56-85) 128/72    Physical Exam  Physical Exam   Constitutional: She is oriented to person, place, and time. She appears well-nourished.   HENT:   Head: Normocephalic and atraumatic.   No teeth   Eyes: Conjunctivae and EOM are normal. No scleral icterus.   Neck: Normal range of motion. Neck supple. No thyromegaly present.   Cardiovascular: Normal rate and normal heart sounds. Exam reveals no friction rub.   No murmur heard.  Pulmonary/Chest: Effort normal and breath sounds normal. No stridor. She has no wheezes. She has no rales.   Abdominal: Soft. Bowel sounds are normal. She exhibits distension. There is tenderness.   Musculoskeletal: She exhibits no edema or tenderness.   Lymphadenopathy:     She has no cervical adenopathy.   Neurological: She is alert and oriented to person, place, and time.   Skin: Skin is warm and dry. She is not diaphoretic.   Psychiatric: She has a normal mood and affect.   Vitals reviewed.      Results Review:    I reviewed the patient's new clinical results and summarized  them in the HPI and in plan.  Glucose   Date/Time Value Ref Range Status   01/18/2019 1205 201 (H) 70 - 130 mg/dL Final   01/18/2019 0751 181 (H) 70 - 130 mg/dL Final   01/18/2019 0459 161 (H) 70 - 130 mg/dL Final   01/18/2019 0030 314 (H) 70 - 130 mg/dL Final   01/17/2019 1850 372 (H) 70 - 130 mg/dL Final   01/17/2019 1637 435 (H) 70 - 130 mg/dL Final   01/17/2019 1528 521 (C) 70 - 130 mg/dL Final   01/17/2019 1419 594 (C) 70 - 130 mg/dL Final   01/17/2019 1233 >599 (C) 70 - 130 mg/dL Final     Lab Results (last 24 hours)     Procedure Component Value Units Date/Time    POC Glucose Once [[id number]]  (Abnormal) Collected:  01/18/19 1205    Specimen:  Blood Updated:  01/18/19 1206     Glucose 201 mg/dL     POC Glucose Once [[id number]]  (Abnormal) Collected:  01/18/19 0751    Specimen:  Blood Updated:  01/18/19 0753     Glucose 181 mg/dL     CBC & Differential [[id number]] Collected:  01/18/19 0532    Specimen:  Blood Updated:  01/18/19 0652    Narrative:       The following orders were created for panel order CBC & Differential.  Procedure                               Abnormality         Status                     ---------                               -----------         ------                     CBC Auto Differential[[id number]]        Abnormal            Final result                 Please view results for these tests on the individual orders.    CBC Auto Differential [[id number]]  (Abnormal) Collected:  01/18/19 0532    Specimen:  Blood Updated:  01/18/19 0652     WBC 2.00 10*3/mm3      RBC 3.85 10*6/mm3      Hemoglobin 10.3 g/dL      Hematocrit 31.7 %      MCV 82.3 fL      MCH 26.8 pg      MCHC 32.5 g/dL      RDW 15.0 %      RDW-SD 44.7 fl      MPV 9.6 fL      Platelets 68 10*3/mm3      Neutrophil % 54.0 %      Lymphocyte % 28.5 %      Monocyte % 11.5 %      Eosinophil % 5.5 %      Basophil % 0.5 %      Immature Grans % 0.0 %      Neutrophils, Absolute 1.08 10*3/mm3      Lymphocytes, Absolute 0.57 10*3/mm3       Monocytes, Absolute 0.23 10*3/mm3      Eosinophils, Absolute 0.11 10*3/mm3      Basophils, Absolute 0.01 10*3/mm3      Immature Grans, Absolute 0.00 10*3/mm3     Basic Metabolic Panel [[id number]]  (Abnormal) Collected:  01/18/19 0532    Specimen:  Blood Updated:  01/18/19 0636     Glucose 157 mg/dL      BUN 11 mg/dL      Creatinine 0.76 mg/dL      Sodium 138 mmol/L      Potassium 3.4 mmol/L      Chloride 104 mmol/L      CO2 22.1 mmol/L      Calcium 8.6 mg/dL      eGFR Non African Amer 79 mL/min/1.73      BUN/Creatinine Ratio 14.5     Anion Gap 11.9 mmol/L     Narrative:       GFR Normal >60  Chronic Kidney Disease <60  Kidney Failure <15    POC Glucose Once [[id number]]  (Abnormal) Collected:  01/18/19 0459    Specimen:  Blood Updated:  01/18/19 0505     Glucose 161 mg/dL     POC Glucose Once [[id number]]  (Abnormal) Collected:  01/18/19 0030    Specimen:  Blood Updated:  01/18/19 0032     Glucose 314 mg/dL     Urinalysis With Microscopic If Indicated (No Culture) - Urine, Clean Catch [[id number]]  (Abnormal) Collected:  01/17/19 1937    Specimen:  Urine, Clean Catch Updated:  01/17/19 2010     Color, UA Yellow     Appearance, UA Clear     pH, UA 6.0     Specific Gravity, UA >=1.030     Glucose, UA >=1000 mg/dL (3+)     Ketones, UA 15 mg/dL (1+)     Bilirubin, UA Negative     Blood, UA Negative     Protein, UA Negative     Leuk Esterase, UA Negative     Nitrite, UA Negative     Urobilinogen, UA 1.0 E.U./dL    Narrative:       Urine microscopic not indicated.    POC Glucose Once [[id number]]  (Abnormal) Collected:  01/17/19 1850    Specimen:  Blood Updated:  01/17/19 1852     Glucose 372 mg/dL     Hemoglobin A1c [[id number]]  (Abnormal) Collected:  01/17/19 1259    Specimen:  Blood Updated:  01/17/19 1807     Hemoglobin A1C 10.50 %     Narrative:       Hemoglobin A1C Ranges:    Increased Risk for Diabetes  5.7% to 6.4%  Diabetes                     >= 6.5%  Diabetic Goal                < 7.0%    POC Glucose Once  "[[id number]]  (Abnormal) Collected:  01/17/19 1637    Specimen:  Blood Updated:  01/17/19 1642     Glucose 435 mg/dL     POC Glucose Once [[id number]]  (Abnormal) Collected:  01/17/19 1528    Specimen:  Blood Updated:  01/17/19 1530     Glucose 521 mg/dL           Imaging Results (last 24 hours)     ** No results found for the last 24 hours. **          Assessment/Plan     Active Hospital Problems    Diagnosis Date Noted   • **Hyperglycemia [R73.9] 01/17/2019   • Dehydration [E86.0] 01/17/2019   • Type 2 diabetes mellitus with hyperglycemia, with long-term current use of insulin (CMS/[facility]) [E11.65, Z79.4] 12/13/2018   • Gastroparesis [K31.84] 10/17/2017   • DUKES (nonalcoholic steatohepatitis) [K75.81] 06/14/2016   • Nausea & vomiting [R11.2] 06/14/2016      Resolved Hospital Problems   No resolved problems to display.     Type 2 diabetes mellitus-extremely uncontrolled, complicated with gastroparesis  Continue Lantus 32 units twice daily  Will start Humalog 6 units with each meal, Humalog dosage needs to be increased if patient's oral intake improves.  Continue Humalog sliding scale 3 times a day before meals and at bedtime.    As an outpatient will try to write an appeal letter to patient's insurance to get her sensor supplies approved.   Patient's variable oral intake has been complicating her insulin regimen and her blood sugar control.  This has been a vicious [place] where her uncontrolled diabetes has been complicating the gastroparesis and vice versa.    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      Discussed plan with nurse.     Thank you for your consultation.  Will follow the pt while in hospital.     Merary Burnett MD.  01/18/19  2:45 PM      EMR Dragon / transcription disclaimer:    \"Dictated utilizing Dragon dictation\".   "

## 2019-01-18 NOTE — PROGRESS NOTES
" LOS: 1 day     Name: Silvia Zabala  Age: 58 y.o.  Sex: female  :  1960  MRN: [id number]         Primary Care Physician: Blanca Pitts MD    Subjective   Subjective  Feeling better today with improvement of her nausea and vomiting.  Abdominal pain is at baseline.  Blood sugars much improved today.    Objective   Vital Signs  Temp:  [97.5 °F (36.4 °C)-98.4 °F (36.9 °C)] 98.3 °F (36.8 °C)  Heart Rate:  [] 86  Resp:  [17-20] 18  BP: (109-140)/(56-85) 117/56  Body mass index is 28.94 kg/m².    Objective:  General Appearance:  Comfortable and in no acute distress (Chronically ill-appearing).    Vital signs: (most recent): Blood pressure 117/56, pulse 86, temperature 98.3 °F (36.8 °C), temperature source Oral, resp. rate 18, height 168.9 cm (66.5\"), weight 82.6 kg (182 lb), SpO2 98 %.    Lungs:  Normal effort and normal respiratory rate.    Heart: Normal rate.  Regular rhythm.    Abdomen: Abdomen is soft.  Bowel sounds are normal.   There is generalized tenderness.     Extremities: There is no dependent edema or local swelling.    Neurological: Patient is alert and oriented to person, place and time.    Skin:  Warm and dry.              Results Review:       I reviewed the patient's new clinical results.    Results from last 7 days   Lab Units 19  0532 19  1259   WBC 10*3/mm3 2.00* 2.90*   HEMOGLOBIN g/dL 10.3* 11.8*   PLATELETS 10*3/mm3 68* 107*     Results from last 7 days   Lab Units 19  0532 19  1259   SODIUM mmol/L 138 129*   POTASSIUM mmol/L 3.4* 4.1   CHLORIDE mmol/L 104 88*   CO2 mmol/L 22.1 22.3   BUN mg/dL 11 17   CREATININE mg/dL 0.76 1.13*   CALCIUM mg/dL 8.6 9.6   GLUCOSE mg/dL 157* 633*     Results from last 7 days   Lab Units 19  1259   INR  1.27*             Scheduled Meds:     amitriptyline 50 mg Oral Nightly   DULoxetine 90 mg Oral Daily   folic acid 1,000 mcg Oral Daily   insulin glargine 32 Units Subcutaneous Q12H   insulin lispro 0-14 Units Subcutaneous " 4x Daily With Meals & Nightly   insulin lispro 6 Units Subcutaneous TID With Meals   levETIRAcetam 500 mg Oral BID   pantoprazole 40 mg Oral Daily   pregabalin 75 mg Oral Q12H   rifaximin 550 mg Oral BID   sodium chloride 3 mL Intravenous Q12H   trimethobenzamide 300 mg Oral TID     PRN Meds:   •  acetaminophen  •  ALPRAZolam  •  cyclobenzaprine  •  dextrose  •  dextrose  •  glucagon (human recombinant)  •  ondansetron  •  [COMPLETED] Insert peripheral IV **AND** sodium chloride  •  sodium chloride  Continuous Infusions:    sodium chloride 125 mL/hr Last Rate: 125 mL/hr (01/17/19 1819)       Assessment/Plan   Active Hospital Problems    Diagnosis Date Noted   • **Hyperglycemia [R73.9] 01/17/2019   • Dehydration [E86.0] 01/17/2019   • Type 2 diabetes mellitus with hyperglycemia, with long-term current use of insulin (CMS/[facility]) [E11.65, Z79.4] 12/13/2018   • Gastroparesis [K31.84] 10/17/2017   • DUKES (nonalcoholic steatohepatitis) [K75.81] 06/14/2016   • Nausea & vomiting [R11.2] 06/14/2016      Resolved Hospital Problems   No resolved problems to display.       Assessment & Plan    - Blood sugars better controlled today.  Continue current regimen.  Her endocrinologist has been consulted  - Nausea and vomiting also improved.  Suspect her gastroparesis will continue to get better with better blood sugar control.  Her GI has been consulted for any additional recommendations regarding her regimen.  Advance diet to full liquids  - Dehydration appears improved.  I will cut back the rate of IV fluids  - Replace potassium today    Wesly Walsh MD  [facility]  01/18/19  12:35 PM

## 2019-01-18 NOTE — PLAN OF CARE
Problem: Patient Care Overview  Goal: Plan of Care Review  Outcome: Ongoing (interventions implemented as appropriate)   01/18/19 0302   Coping/Psychosocial   Plan of Care Reviewed With patient   Plan of Care Review   Progress no change   OTHER   Outcome Summary BS remains high. Accu checks q 4 hours. C/O nausea this shift and treated with prn zofran. Up to bathroom frequently. VSS. Will continue to monitor.     Goal: Individualization and Mutuality  Outcome: Ongoing (interventions implemented as appropriate)    Goal: Discharge Needs Assessment  Outcome: Ongoing (interventions implemented as appropriate)      Problem: Hyperglycemia, Persistent (Adult)  Goal: Signs and Symptoms of Listed Potential Problems Will be Absent, Minimized or Managed (Hyperglycemia, Persistent)  Outcome: Ongoing (interventions implemented as appropriate)      Problem: Fall Risk (Adult)  Goal: Identify Related Risk Factors and Signs and Symptoms  Outcome: Ongoing (interventions implemented as appropriate)    Goal: Absence of Fall  Outcome: Ongoing (interventions implemented as appropriate)

## 2019-01-19 LAB
ANION GAP SERPL CALCULATED.3IONS-SCNC: 12.2 MMOL/L
BASOPHILS # BLD AUTO: 0.02 10*3/MM3 (ref 0–0.2)
BASOPHILS NFR BLD AUTO: 1.2 % (ref 0–1.5)
BUN BLD-MCNC: 9 MG/DL (ref 6–20)
BUN/CREAT SERPL: 12.5 (ref 7–25)
CALCIUM SPEC-SCNC: 8.7 MG/DL (ref 8.6–10.5)
CHLORIDE SERPL-SCNC: 108 MMOL/L (ref 98–107)
CO2 SERPL-SCNC: 19.8 MMOL/L (ref 22–29)
CREAT BLD-MCNC: 0.72 MG/DL (ref 0.57–1)
DEPRECATED RDW RBC AUTO: 47.4 FL (ref 37–54)
EOSINOPHIL # BLD AUTO: 0.08 10*3/MM3 (ref 0–0.7)
EOSINOPHIL NFR BLD AUTO: 4.8 % (ref 0.3–6.2)
ERYTHROCYTE [DISTWIDTH] IN BLOOD BY AUTOMATED COUNT: 15.3 % (ref 11.7–13)
GFR SERPL CREATININE-BSD FRML MDRD: 84 ML/MIN/1.73
GLUCOSE BLD-MCNC: 208 MG/DL (ref 65–99)
GLUCOSE BLDC GLUCOMTR-MCNC: 248 MG/DL (ref 70–130)
GLUCOSE BLDC GLUCOMTR-MCNC: 263 MG/DL (ref 70–130)
GLUCOSE BLDC GLUCOMTR-MCNC: 297 MG/DL (ref 70–130)
GLUCOSE BLDC GLUCOMTR-MCNC: 440 MG/DL (ref 70–130)
GLUCOSE BLDC GLUCOMTR-MCNC: 445 MG/DL (ref 70–130)
HCT VFR BLD AUTO: 32.2 % (ref 35.6–45.5)
HGB BLD-MCNC: 10.3 G/DL (ref 11.9–15.5)
IMM GRANULOCYTES # BLD AUTO: 0 10*3/MM3 (ref 0–0.03)
IMM GRANULOCYTES NFR BLD AUTO: 0 % (ref 0–0.5)
LYMPHOCYTES # BLD AUTO: 0.46 10*3/MM3 (ref 0.9–4.8)
LYMPHOCYTES NFR BLD AUTO: 27.9 % (ref 19.6–45.3)
MCH RBC QN AUTO: 27.1 PG (ref 26.9–32)
MCHC RBC AUTO-ENTMCNC: 32 G/DL (ref 32.4–36.3)
MCV RBC AUTO: 84.7 FL (ref 80.5–98.2)
MONOCYTES # BLD AUTO: 0.19 10*3/MM3 (ref 0.2–1.2)
MONOCYTES NFR BLD AUTO: 11.5 % (ref 5–12)
NEUTROPHILS # BLD AUTO: 0.9 10*3/MM3 (ref 1.9–8.1)
NEUTROPHILS NFR BLD AUTO: 54.6 % (ref 42.7–76)
PLATELET # BLD AUTO: 59 10*3/MM3 (ref 140–500)
PMV BLD AUTO: 10.3 FL (ref 6–12)
POTASSIUM BLD-SCNC: 3.8 MMOL/L (ref 3.5–5.2)
RBC # BLD AUTO: 3.8 10*6/MM3 (ref 3.9–5.2)
SODIUM BLD-SCNC: 140 MMOL/L (ref 136–145)
T4 FREE SERPL-MCNC: 1.02 NG/DL (ref 0.93–1.7)
TSH SERPL DL<=0.05 MIU/L-ACNC: 2.39 MIU/ML (ref 0.27–4.2)
WBC NRBC COR # BLD: 1.65 10*3/MM3 (ref 4.5–10.7)

## 2019-01-19 PROCEDURE — 84443 ASSAY THYROID STIM HORMONE: CPT | Performed by: INTERNAL MEDICINE

## 2019-01-19 PROCEDURE — 85025 COMPLETE CBC W/AUTO DIFF WBC: CPT | Performed by: INTERNAL MEDICINE

## 2019-01-19 PROCEDURE — 99232 SBSQ HOSP IP/OBS MODERATE 35: CPT | Performed by: INTERNAL MEDICINE

## 2019-01-19 PROCEDURE — 84439 ASSAY OF FREE THYROXINE: CPT | Performed by: INTERNAL MEDICINE

## 2019-01-19 PROCEDURE — 99231 SBSQ HOSP IP/OBS SF/LOW 25: CPT | Performed by: INTERNAL MEDICINE

## 2019-01-19 PROCEDURE — 80048 BASIC METABOLIC PNL TOTAL CA: CPT | Performed by: INTERNAL MEDICINE

## 2019-01-19 PROCEDURE — 63710000001 INSULIN LISPRO (HUMAN) PER 5 UNITS: Performed by: INTERNAL MEDICINE

## 2019-01-19 PROCEDURE — 82962 GLUCOSE BLOOD TEST: CPT

## 2019-01-19 PROCEDURE — 63710000001 INSULIN GLARGINE PER 5 UNITS: Performed by: INTERNAL MEDICINE

## 2019-01-19 RX ADMIN — ALPRAZOLAM 0.5 MG: 0.5 TABLET ORAL at 14:16

## 2019-01-19 RX ADMIN — LEVETIRACETAM 500 MG: 500 TABLET, FILM COATED ORAL at 21:43

## 2019-01-19 RX ADMIN — SODIUM CHLORIDE 75 ML/HR: 9 INJECTION, SOLUTION INTRAVENOUS at 05:00

## 2019-01-19 RX ADMIN — PREGABALIN 75 MG: 75 CAPSULE ORAL at 09:51

## 2019-01-19 RX ADMIN — INSULIN LISPRO 3 UNITS: 100 INJECTION, SOLUTION INTRAVENOUS; SUBCUTANEOUS at 13:09

## 2019-01-19 RX ADMIN — INSULIN GLARGINE 32 UNITS: 100 INJECTION, SOLUTION SUBCUTANEOUS at 21:41

## 2019-01-19 RX ADMIN — INSULIN LISPRO 5 UNITS: 100 INJECTION, SOLUTION INTRAVENOUS; SUBCUTANEOUS at 09:50

## 2019-01-19 RX ADMIN — INSULIN LISPRO 6 UNITS: 100 INJECTION, SOLUTION INTRAVENOUS; SUBCUTANEOUS at 09:51

## 2019-01-19 RX ADMIN — INSULIN LISPRO 8 UNITS: 100 INJECTION, SOLUTION INTRAVENOUS; SUBCUTANEOUS at 21:42

## 2019-01-19 RX ADMIN — PREGABALIN 75 MG: 75 CAPSULE ORAL at 21:43

## 2019-01-19 RX ADMIN — TRIMETHOBENZAMIDE HYDROCHLORIDE 300 MG: 300 CAPSULE ORAL at 09:51

## 2019-01-19 RX ADMIN — INSULIN GLARGINE 32 UNITS: 100 INJECTION, SOLUTION SUBCUTANEOUS at 09:50

## 2019-01-19 RX ADMIN — ACETAMINOPHEN 650 MG: 325 TABLET, FILM COATED ORAL at 03:16

## 2019-01-19 RX ADMIN — FOLIC ACID 1000 MCG: 1 TABLET ORAL at 09:51

## 2019-01-19 RX ADMIN — INSULIN LISPRO 4 UNITS: 100 INJECTION, SOLUTION INTRAVENOUS; SUBCUTANEOUS at 19:08

## 2019-01-19 RX ADMIN — INSULIN LISPRO 6 UNITS: 100 INJECTION, SOLUTION INTRAVENOUS; SUBCUTANEOUS at 13:08

## 2019-01-19 RX ADMIN — RIFAXIMIN 550 MG: 550 TABLET ORAL at 09:51

## 2019-01-19 RX ADMIN — SODIUM CHLORIDE, PRESERVATIVE FREE 3 ML: 5 INJECTION INTRAVENOUS at 09:52

## 2019-01-19 RX ADMIN — LEVETIRACETAM 500 MG: 500 TABLET, FILM COATED ORAL at 09:51

## 2019-01-19 RX ADMIN — TRIMETHOBENZAMIDE HYDROCHLORIDE 300 MG: 300 CAPSULE ORAL at 19:07

## 2019-01-19 RX ADMIN — INSULIN LISPRO 6 UNITS: 100 INJECTION, SOLUTION INTRAVENOUS; SUBCUTANEOUS at 19:07

## 2019-01-19 RX ADMIN — RIFAXIMIN 550 MG: 550 TABLET ORAL at 21:43

## 2019-01-19 RX ADMIN — TRIMETHOBENZAMIDE HYDROCHLORIDE 300 MG: 300 CAPSULE ORAL at 21:43

## 2019-01-19 RX ADMIN — DULOXETINE HYDROCHLORIDE 90 MG: 60 CAPSULE, DELAYED RELEASE ORAL at 09:51

## 2019-01-19 RX ADMIN — PANTOPRAZOLE SODIUM 40 MG: 40 TABLET, DELAYED RELEASE ORAL at 06:48

## 2019-01-19 RX ADMIN — SODIUM CHLORIDE, PRESERVATIVE FREE 3 ML: 5 INJECTION INTRAVENOUS at 21:54

## 2019-01-19 RX ADMIN — CYCLOBENZAPRINE 5 MG: 10 TABLET, FILM COATED ORAL at 21:43

## 2019-01-19 RX ADMIN — AMITRIPTYLINE HYDROCHLORIDE 50 MG: 50 TABLET, FILM COATED ORAL at 21:43

## 2019-01-19 NOTE — PROGRESS NOTES
" LOS: 2 days     Name: Silvia Zabala  Age: 58 y.o.  Sex: female  :  1960  MRN: [id number]         Primary Care Physician: Blanca Pitts MD    Subjective   Subjective  Continues to feel better each day.  Denies nausea or vomiting.  Tolerating full liquid diet.    Objective   Vital Signs  Temp:  [98.3 °F (36.8 °C)-98.5 °F (36.9 °C)] 98.3 °F (36.8 °C)  Heart Rate:  [] 94  Resp:  [18-20] 20  BP: (118-128)/(60-72) 124/66  Body mass index is 28.94 kg/m².    Objective:  General Appearance:  Comfortable and in no acute distress.    Vital signs: (most recent): Blood pressure 124/66, pulse 94, temperature 98.3 °F (36.8 °C), temperature source Oral, resp. rate 20, height 168.9 cm (66.5\"), weight 82.6 kg (182 lb), SpO2 98 %.    Lungs:  Normal effort and normal respiratory rate.    Heart: Normal rate.  Regular rhythm.    Abdomen: Abdomen is soft.  Bowel sounds are normal.   There is generalized tenderness.  (Chronic abdominal pain unchanged).     Extremities: There is no dependent edema or local swelling.    Neurological: Patient is alert and oriented to person, place and time.    Skin:  Warm and dry.              Results Review:       I reviewed the patient's new clinical results.    Results from last 7 days   Lab Units 19  0658 19  0532 19  1259   WBC 10*3/mm3 1.65* 2.00* 2.90*   HEMOGLOBIN g/dL 10.3* 10.3* 11.8*   PLATELETS 10*3/mm3 59* 68* 107*     Results from last 7 days   Lab Units 19  0658 19  0532 19  1259   SODIUM mmol/L 140 138 129*   POTASSIUM mmol/L 3.8 3.4* 4.1   CHLORIDE mmol/L 108* 104 88*   CO2 mmol/L 19.8* 22.1 22.3   BUN mg/dL 9 11 17   CREATININE mg/dL 0.72 0.76 1.13*   CALCIUM mg/dL 8.7 8.6 9.6   GLUCOSE mg/dL 208* 157* 633*     Results from last 7 days   Lab Units 19  1259   INR  1.27*             Scheduled Meds:     amitriptyline 50 mg Oral Nightly   DULoxetine 90 mg Oral Daily   folic acid 1,000 mcg Oral Daily   insulin glargine 32 Units " Subcutaneous Q12H   insulin lispro 0-12 Units Subcutaneous 4x Daily With Meals & Nightly   insulin lispro 6 Units Subcutaneous TID With Meals   levETIRAcetam 500 mg Oral BID   pantoprazole 40 mg Oral Daily   pregabalin 75 mg Oral Q12H   rifaximin 550 mg Oral BID   sodium chloride 3 mL Intravenous Q12H   trimethobenzamide 300 mg Oral TID     PRN Meds:   •  acetaminophen  •  ALPRAZolam  •  cyclobenzaprine  •  dextrose  •  dextrose  •  glucagon (human recombinant)  •  ondansetron  •  [COMPLETED] Insert peripheral IV **AND** sodium chloride  •  sodium chloride  Continuous Infusions:    sodium chloride 75 mL/hr Last Rate: 75 mL/hr (01/19/19 0500)       Assessment/Plan   Active Hospital Problems    Diagnosis Date Noted   • **Hyperglycemia [R73.9] 01/17/2019   • Dehydration [E86.0] 01/17/2019   • Type 2 diabetes mellitus with hyperglycemia, with long-term current use of insulin (CMS/HCC) [E11.65, Z79.4] 12/13/2018   • Gastroparesis [K31.84] 10/17/2017   • DUKES (nonalcoholic steatohepatitis) [K75.81] 06/14/2016   • Nausea & vomiting [R11.2] 06/14/2016   • Hypersplenism [D73.1] 04/12/2016   • Pancytopenia (CMS/HCC) [D61.818] 04/12/2016      Resolved Hospital Problems   No resolved problems to display.       Assessment & Plan    -  nausea and vomiting have improved.  Her gastroparesis is settling down with better blood sugar control.  Continue oral regimen of medications for her gastroparesis.  I will advance her to GI soft/bland diet today.  - Endocrinology adjusting insulin.  We will see what her requirements will be as we advanced diet to solids.  -  stop IV fluids  - Her pancytopenia is chronic.  Numbers appear slightly lower than normal and will continue to monitor.          Wesly Walsh MD  [facility]  01/19/19  12:43 PM

## 2019-01-19 NOTE — PROGRESS NOTES
[facility]     Inpatient Follow Up Note    Patient Identification:  Name: Silvia Zabala  Age: 58 y.o.  Sex: female  :  1960  MRN: [id number]    Information from:patient     CC: Gastroparesis    History:   Much less nausea and vomiting at this time.  Tolerating diet.  Overall control of her blood glucoses have improved although occasionally she spikes to very high levels E while she is in the hospital.  Pancytopenia is noted.  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    Review of Systems:  Constitutional:  Weak  Cardiovascular:  Negative   Respiratory:  Negative             Problem List:  Patient Active Problem List    Diagnosis   • *Hyperglycemia [R73.9]   • Dehydration [E86.0]   • Vitamin D insufficiency [E55.9]   • Type 2 diabetes mellitus with hyperglycemia, with long-term current use of insulin (CMS/HCC) [E11.65, Z79.4]   • BRICE (obstructive sleep apnea) [G47.33]   • Gastroparesis [K31.84]   • Hyperammonemia (CMS/HCC) [E72.20]   • Hyponatremia [E87.1]   • Anemia [D64.9]   • Migraine without aura [G43.009]   • Urinary retention [R33.9]   • UGI bleed [K92.2]   • Intractable vomiting with nausea [R11.2]   • Degeneration of lumbosacral intervertebral disc [M51.37]   • Seizure (CMS/HCC) [R56.9]   • Spondylosis without myelopathy [M47.819]   • Acute ITP (CMS/HCC) [D69.3]   • Fever [R50.9]   • Thrombocytopenia (CMS/HCC) [D69.6]   • Hepatic encephalopathy (CMS/HCC) [K72.90]   • Abnormal finding of blood chemistry  [R79.9]   • Diabetic peripheral neuropathy (CMS/HCC) [E11.42]   • History of seizure disorder [Z86.69]   • Diabetic ketoacidosis without coma associated with type 2 diabetes mellitus (CMS/HCC) [E11.10]   • Cirrhosis of liver with ascites (CMS/HCC) [K74.60, R18.8]   • Gastroparesis [K31.84]   • Portal hypertension (CMS/HCC) [K76.6]   • Systemic lupus (CMS/HCC) [M32.9]   • Secondary esophageal varices without bleeding (CMS/HCC) [I85.10]   • Ascites  "[R18.8]   • Hematemesis [K92.0]   • Abdominal pain [R10.9]   • Hyperlipidemia [E78.5]   • Nausea & vomiting [R11.2]   • DUKES (nonalcoholic steatohepatitis) [K75.81]   • Pancytopenia (CMS/HCC) [D61.818]   • Hypersplenism [D73.1]   • Type 2 diabetes mellitus, uncontrolled, with neuropathy (CMS/HCC) [E11.40, E11.65]   • Fibrositis [M79.7]   • Change in blood platelet count [TLI6707]   • Cirrhosis of liver (CMS/HCC) [K74.60]   • Rheumatoid arthritis (CMS/HCC) [M06.9]   • Anxiety and depression [F41.9, F32.9]     Current Meds:  MAR Reviewed  Scheduled Meds:  amitriptyline 50 mg Oral Nightly   DULoxetine 90 mg Oral Daily   folic acid 1,000 mcg Oral Daily   insulin glargine 32 Units Subcutaneous Q12H   insulin lispro 0-12 Units Subcutaneous 4x Daily With Meals & Nightly   insulin lispro 6 Units Subcutaneous TID With Meals   levETIRAcetam 500 mg Oral BID   pantoprazole 40 mg Oral Daily   pregabalin 75 mg Oral Q12H   rifaximin 550 mg Oral BID   sodium chloride 3 mL Intravenous Q12H   trimethobenzamide 300 mg Oral TID     Continuous Infusions:   PRN Meds:.•  acetaminophen  •  ALPRAZolam  •  cyclobenzaprine  •  dextrose  •  dextrose  •  glucagon (human recombinant)  •  ondansetron  •  [COMPLETED] Insert peripheral IV **AND** sodium chloride  •  sodium chloride  Allergies:  Allergies   Allergen Reactions   • Albuterol Anaphylaxis   • Tramadol Nausea Only, Other (See Comments) and GI Intolerance     Per pt causes her \"palsy, meaning shaking and tremors\"    • Lactulose Nausea And Vomiting and Other (See Comments)     Severe abdominal pain   • Quinine Derivatives Nausea And Vomiting       Intake/Output:     Intake/Output Summary (Last 24 hours) at 2019 1342  Last data filed at 2019 0500  Gross per 24 hour   Intake 1490 ml   Output --   Net 1490 ml     New allergies/reactions:  None    Physical Exam:  Vitals:   Temp (24hrs), Av.4 °F (36.9 °C), Min:98.3 °F (36.8 °C), Max:98.5 °F (36.9 °C)    Temp:  [98.3 °F (36.8 " "°C)-98.5 °F (36.9 °C)] 98.3 °F (36.8 °C)  Heart Rate:  [] 103  Resp:  [18-20] 20  BP: (118-134)/(60-73) 134/73  /73 (BP Location: Left arm, Patient Position: Sitting)   Pulse 103   Temp 98.3 °F (36.8 °C) (Oral)   Resp 20   Ht 168.9 cm (66.5\")   Wt 82.6 kg (182 lb)   SpO2 98%   BMI 28.94 kg/m²     Exam:  NAD  PERRLA. Sclerae and conjunctivae normal  HENT: external inspection normal. Hearing intact.  No respiratory distress.   Alert, oriented, normal affect.         DATA:  Radiology and Labs:   Recent Results (from the past 24 hour(s))   POC Glucose Once    Collection Time: 01/18/19  5:25 PM   Result Value Ref Range    Glucose 136 (H) 70 - 130 mg/dL   POC Glucose Once    Collection Time: 01/18/19  8:01 PM   Result Value Ref Range    Glucose 283 (H) 70 - 130 mg/dL   Basic Metabolic Panel    Collection Time: 01/19/19  6:58 AM   Result Value Ref Range    Glucose 208 (H) 65 - 99 mg/dL    BUN 9 6 - 20 mg/dL    Creatinine 0.72 0.57 - 1.00 mg/dL    Sodium 140 136 - 145 mmol/L    Potassium 3.8 3.5 - 5.2 mmol/L    Chloride 108 (H) 98 - 107 mmol/L    CO2 19.8 (L) 22.0 - 29.0 mmol/L    Calcium 8.7 8.6 - 10.5 mg/dL    eGFR Non African Amer 84 >60 mL/min/1.73    BUN/Creatinine Ratio 12.5 7.0 - 25.0    Anion Gap 12.2 mmol/L   TSH    Collection Time: 01/19/19  6:58 AM   Result Value Ref Range    TSH 2.390 0.270 - 4.200 mIU/mL   T4, Free    Collection Time: 01/19/19  6:58 AM   Result Value Ref Range    Free T4 1.02 0.93 - 1.70 ng/dL   CBC Auto Differential    Collection Time: 01/19/19  6:58 AM   Result Value Ref Range    WBC 1.65 (C) 4.50 - 10.70 10*3/mm3    RBC 3.80 (L) 3.90 - 5.20 10*6/mm3    Hemoglobin 10.3 (L) 11.9 - 15.5 g/dL    Hematocrit 32.2 (L) 35.6 - 45.5 %    MCV 84.7 80.5 - 98.2 fL    MCH 27.1 26.9 - 32.0 pg    MCHC 32.0 (L) 32.4 - 36.3 g/dL    RDW 15.3 (H) 11.7 - 13.0 %    RDW-SD 47.4 37.0 - 54.0 fl    MPV 10.3 6.0 - 12.0 fL    Platelets 59 (L) 140 - 500 10*3/mm3    Neutrophil % 54.6 42.7 - 76.0 %    " Lymphocyte % 27.9 19.6 - 45.3 %    Monocyte % 11.5 5.0 - 12.0 %    Eosinophil % 4.8 0.3 - 6.2 %    Basophil % 1.2 0.0 - 1.5 %    Immature Grans % 0.0 0.0 - 0.5 %    Neutrophils, Absolute 0.90 (C) 1.90 - 8.10 10*3/mm3    Lymphocytes, Absolute 0.46 (L) 0.90 - 4.80 10*3/mm3    Monocytes, Absolute 0.19 (L) 0.20 - 1.20 10*3/mm3    Eosinophils, Absolute 0.08 0.00 - 0.70 10*3/mm3    Basophils, Absolute 0.02 0.00 - 0.20 10*3/mm3    Immature Grans, Absolute 0.00 0.00 - 0.03 10*3/mm3   POC Glucose Once    Collection Time: 01/19/19  8:17 AM   Result Value Ref Range    Glucose 248 (H) 70 - 130 mg/dL   POC Glucose Once    Collection Time: 01/19/19 11:44 AM   Result Value Ref Range    Glucose 263 (H) 70 - 130 mg/dL       Assessment:   Problem List:     Hyperglycemia    Pancytopenia (CMS/HCC)    Hypersplenism    Nausea & vomiting    DUKES (nonalcoholic steatohepatitis)    Gastroparesis    Type 2 diabetes mellitus with hyperglycemia, with long-term current use of insulin (CMS/HCC)    Dehydration    She is holding her room at this time.  She has substantial medical problems, and they are generally intractable, but currently they appear to be stable.    Plan:   Continue current management and careful monitoring.         Rich Coleman MD  [facility]  1/19/2019

## 2019-01-19 NOTE — PLAN OF CARE
Problem: Patient Care Overview  Goal: Plan of Care Review  Outcome: Ongoing (interventions implemented as appropriate)   01/19/19 1000 01/19/19 3807   Coping/Psychosocial   Plan of Care Reviewed With patient --    Plan of Care Review   Progress --  improving   OTHER   Outcome Summary --  No complaints of nausea, diet advanced, IVF d/c'd, will ct to monitor.        Problem: Hyperglycemia, Persistent (Adult)  Goal: Signs and Symptoms of Listed Potential Problems Will be Absent, Minimized or Managed (Hyperglycemia, Persistent)  Outcome: Ongoing (interventions implemented as appropriate)

## 2019-01-19 NOTE — PLAN OF CARE
Problem: Patient Care Overview  Goal: Plan of Care Review  Outcome: Ongoing (interventions implemented as appropriate)   01/19/19 5927   Coping/Psychosocial   Plan of Care Reviewed With patient;spouse   Plan of Care Review   Progress no change   OTHER   Outcome Summary Pt c/o diarhhea this shift. Awake and alert throughout shift. Up to bathroom frequently. VSS. Will continue to monitor.     Goal: Individualization and Mutuality  Outcome: Ongoing (interventions implemented as appropriate)    Goal: Discharge Needs Assessment  Outcome: Ongoing (interventions implemented as appropriate)      Problem: Hyperglycemia, Persistent (Adult)  Goal: Signs and Symptoms of Listed Potential Problems Will be Absent, Minimized or Managed (Hyperglycemia, Persistent)  Outcome: Ongoing (interventions implemented as appropriate)      Problem: Fall Risk (Adult)  Goal: Identify Related Risk Factors and Signs and Symptoms  Outcome: Ongoing (interventions implemented as appropriate)

## 2019-01-19 NOTE — PROGRESS NOTES
"  ENDOCRINE    Subjective   and PLANS  Silvia Zabala is a 58 y.o. female.     Follow-up diabetes.    Feels better.  No nausea or vomiting.  Tolerating full liquids.  Fasting glucose 375.  Random glucose .  Continue Lantus 32 units twice a day and Humalog 6 units with each meal plus sliding scale.  Revise Humalog sliding scale to 1 unit per 40 greater than 150.    Advance diet when okay with GI.    Objective   /60 (BP Location: Left arm, Patient Position: Lying)   Pulse 92   Temp 98.4 °F (36.9 °C) (Oral)   Resp 18   Ht 168.9 cm (66.5\")   Wt 82.6 kg (182 lb)   SpO2 98%   BMI 28.94 kg/m²   Physical Exam    Awake, alert, not in distress.  No rales or wheezes.  Regular heart rate and rhythm.  Abdomen soft, nontender.  Extremities warm.  No cyanosis.    Lab Results (last 24 hours)     Procedure Component Value Units Date/Time    TSH [[id number]]  (Normal) Collected:  01/19/19 0658    Specimen:  Blood Updated:  01/19/19 0848     TSH 2.390 mIU/mL     T4, Free [[id number]]  (Normal) Collected:  01/19/19 0658    Specimen:  Blood Updated:  01/19/19 0848     Free T4 1.02 ng/dL     Basic Metabolic Panel [[id number]]  (Abnormal) Collected:  01/19/19 0658    Specimen:  Blood Updated:  01/19/19 0838     Glucose 208 mg/dL      BUN 9 mg/dL      Creatinine 0.72 mg/dL      Sodium 140 mmol/L      Potassium 3.8 mmol/L      Chloride 108 mmol/L      CO2 19.8 mmol/L      Calcium 8.7 mg/dL      eGFR Non African Amer 84 mL/min/1.73      BUN/Creatinine Ratio 12.5     Anion Gap 12.2 mmol/L     Narrative:       GFR Normal >60  Chronic Kidney Disease <60  Kidney Failure <15    CBC & Differential [[id number]] Collected:  01/19/19 0658    Specimen:  Blood Updated:  01/19/19 0835    Narrative:       The following orders were created for panel order CBC & Differential.  Procedure                               Abnormality         Status                     ---------                               -----------         ------              "        CBC Auto Differential[[id number]]        Abnormal            Final result                 Please view results for these tests on the individual orders.    CBC Auto Differential [[id number]]  (Abnormal) Collected:  01/19/19 0658    Specimen:  Blood Updated:  01/19/19 0835     WBC 1.65 10*3/mm3      RBC 3.80 10*6/mm3      Hemoglobin 10.3 g/dL      Hematocrit 32.2 %      MCV 84.7 fL      MCH 27.1 pg      MCHC 32.0 g/dL      RDW 15.3 %      RDW-SD 47.4 fl      MPV 10.3 fL      Platelets 59 10*3/mm3      Neutrophil % 54.6 %      Lymphocyte % 27.9 %      Monocyte % 11.5 %      Eosinophil % 4.8 %      Basophil % 1.2 %      Immature Grans % 0.0 %      Neutrophils, Absolute 0.90 10*3/mm3      Lymphocytes, Absolute 0.46 10*3/mm3      Monocytes, Absolute 0.19 10*3/mm3      Eosinophils, Absolute 0.08 10*3/mm3      Basophils, Absolute 0.02 10*3/mm3      Immature Grans, Absolute 0.00 10*3/mm3     POC Glucose Once [[id number]]  (Abnormal) Collected:  01/19/19 0817    Specimen:  Blood Updated:  01/19/19 0818     Glucose 248 mg/dL     POC Glucose Once [[id number]]  (Abnormal) Collected:  01/18/19 2001    Specimen:  Blood Updated:  01/18/19 2014     Glucose 283 mg/dL     POC Glucose Once [[id number]]  (Abnormal) Collected:  01/18/19 1725    Specimen:  Blood Updated:  01/18/19 1727     Glucose 136 mg/dL     POC Glucose Once [[id number]]  (Abnormal) Collected:  01/18/19 1205    Specimen:  Blood Updated:  01/18/19 1206     Glucose 201 mg/dL               Hyperglycemia    Nausea & vomiting    DUKES (nonalcoholic steatohepatitis)    Gastroparesis    Type 2 diabetes mellitus with hyperglycemia, with long-term current use of insulin (CMS/HCC)    Dehydration    Insulin therapy as discussed above.

## 2019-01-19 NOTE — PLAN OF CARE
Problem: Fall Risk (Adult)  Goal: Identify Related Risk Factors and Signs and Symptoms  Outcome: Outcome(s) achieved Date Met: 01/19/19    Goal: Absence of Fall  Outcome: Ongoing (interventions implemented as appropriate)

## 2019-01-20 LAB
ANION GAP SERPL CALCULATED.3IONS-SCNC: 12.5 MMOL/L
BASOPHILS # BLD AUTO: 0 10*3/MM3 (ref 0–0.2)
BASOPHILS NFR BLD AUTO: 0 % (ref 0–1.5)
BUN BLD-MCNC: 8 MG/DL (ref 6–20)
BUN/CREAT SERPL: 11 (ref 7–25)
CALCIUM SPEC-SCNC: 9.1 MG/DL (ref 8.6–10.5)
CHLORIDE SERPL-SCNC: 105 MMOL/L (ref 98–107)
CO2 SERPL-SCNC: 21.5 MMOL/L (ref 22–29)
CREAT BLD-MCNC: 0.73 MG/DL (ref 0.57–1)
DEPRECATED RDW RBC AUTO: 47.6 FL (ref 37–54)
EOSINOPHIL # BLD AUTO: 0.09 10*3/MM3 (ref 0–0.7)
EOSINOPHIL NFR BLD AUTO: 5.1 % (ref 0.3–6.2)
ERYTHROCYTE [DISTWIDTH] IN BLOOD BY AUTOMATED COUNT: 15.4 % (ref 11.7–13)
GFR SERPL CREATININE-BSD FRML MDRD: 82 ML/MIN/1.73
GLUCOSE BLD-MCNC: 267 MG/DL (ref 65–99)
GLUCOSE BLDC GLUCOMTR-MCNC: 246 MG/DL (ref 70–130)
GLUCOSE BLDC GLUCOMTR-MCNC: 257 MG/DL (ref 70–130)
GLUCOSE BLDC GLUCOMTR-MCNC: 316 MG/DL (ref 70–130)
GLUCOSE BLDC GLUCOMTR-MCNC: 413 MG/DL (ref 70–130)
GLUCOSE BLDC GLUCOMTR-MCNC: 415 MG/DL (ref 70–130)
HCT VFR BLD AUTO: 31.8 % (ref 35.6–45.5)
HGB BLD-MCNC: 10.1 G/DL (ref 11.9–15.5)
IMM GRANULOCYTES # BLD AUTO: 0 10*3/MM3 (ref 0–0.03)
IMM GRANULOCYTES NFR BLD AUTO: 0 % (ref 0–0.5)
LYMPHOCYTES # BLD AUTO: 0.41 10*3/MM3 (ref 0.9–4.8)
LYMPHOCYTES NFR BLD AUTO: 23 % (ref 19.6–45.3)
MCH RBC QN AUTO: 26.9 PG (ref 26.9–32)
MCHC RBC AUTO-ENTMCNC: 31.8 G/DL (ref 32.4–36.3)
MCV RBC AUTO: 84.6 FL (ref 80.5–98.2)
MONOCYTES # BLD AUTO: 0.23 10*3/MM3 (ref 0.2–1.2)
MONOCYTES NFR BLD AUTO: 12.9 % (ref 5–12)
NEUTROPHILS # BLD AUTO: 1.05 10*3/MM3 (ref 1.9–8.1)
NEUTROPHILS NFR BLD AUTO: 59 % (ref 42.7–76)
PLATELET # BLD AUTO: 54 10*3/MM3 (ref 140–500)
PMV BLD AUTO: 10.2 FL (ref 6–12)
POTASSIUM BLD-SCNC: 3.8 MMOL/L (ref 3.5–5.2)
RBC # BLD AUTO: 3.76 10*6/MM3 (ref 3.9–5.2)
SODIUM BLD-SCNC: 139 MMOL/L (ref 136–145)
WBC NRBC COR # BLD: 1.78 10*3/MM3 (ref 4.5–10.7)

## 2019-01-20 PROCEDURE — 82962 GLUCOSE BLOOD TEST: CPT

## 2019-01-20 PROCEDURE — 99231 SBSQ HOSP IP/OBS SF/LOW 25: CPT | Performed by: INTERNAL MEDICINE

## 2019-01-20 PROCEDURE — 99232 SBSQ HOSP IP/OBS MODERATE 35: CPT | Performed by: INTERNAL MEDICINE

## 2019-01-20 PROCEDURE — 63710000001 INSULIN GLARGINE PER 5 UNITS: Performed by: INTERNAL MEDICINE

## 2019-01-20 PROCEDURE — 85025 COMPLETE CBC W/AUTO DIFF WBC: CPT | Performed by: INTERNAL MEDICINE

## 2019-01-20 PROCEDURE — 63710000001 INSULIN LISPRO (HUMAN) PER 5 UNITS: Performed by: INTERNAL MEDICINE

## 2019-01-20 PROCEDURE — 80048 BASIC METABOLIC PNL TOTAL CA: CPT | Performed by: INTERNAL MEDICINE

## 2019-01-20 PROCEDURE — 25010000002 ONDANSETRON PER 1 MG: Performed by: INTERNAL MEDICINE

## 2019-01-20 RX ORDER — INSULIN GLARGINE 100 [IU]/ML
10 INJECTION, SOLUTION SUBCUTANEOUS ONCE
Status: COMPLETED | OUTPATIENT
Start: 2019-01-20 | End: 2019-01-20

## 2019-01-20 RX ORDER — INSULIN GLARGINE 100 [IU]/ML
36 INJECTION, SOLUTION SUBCUTANEOUS EVERY 12 HOURS SCHEDULED
Status: DISCONTINUED | OUTPATIENT
Start: 2019-01-20 | End: 2019-01-21

## 2019-01-20 RX ADMIN — PANTOPRAZOLE SODIUM 40 MG: 40 TABLET, DELAYED RELEASE ORAL at 19:41

## 2019-01-20 RX ADMIN — ONDANSETRON 4 MG: 2 INJECTION INTRAMUSCULAR; INTRAVENOUS at 13:58

## 2019-01-20 RX ADMIN — INSULIN LISPRO 7 UNITS: 100 INJECTION, SOLUTION INTRAVENOUS; SUBCUTANEOUS at 13:58

## 2019-01-20 RX ADMIN — ACETAMINOPHEN 650 MG: 325 TABLET, FILM COATED ORAL at 00:24

## 2019-01-20 RX ADMIN — INSULIN LISPRO 12 UNITS: 100 INJECTION, SOLUTION INTRAVENOUS; SUBCUTANEOUS at 13:58

## 2019-01-20 RX ADMIN — CYCLOBENZAPRINE 5 MG: 10 TABLET, FILM COATED ORAL at 23:47

## 2019-01-20 RX ADMIN — FOLIC ACID 1000 MCG: 1 TABLET ORAL at 08:29

## 2019-01-20 RX ADMIN — TRIMETHOBENZAMIDE HYDROCHLORIDE 300 MG: 300 CAPSULE ORAL at 17:39

## 2019-01-20 RX ADMIN — INSULIN GLARGINE 36 UNITS: 100 INJECTION, SOLUTION SUBCUTANEOUS at 21:25

## 2019-01-20 RX ADMIN — INSULIN LISPRO 5 UNITS: 100 INJECTION, SOLUTION INTRAVENOUS; SUBCUTANEOUS at 21:25

## 2019-01-20 RX ADMIN — INSULIN GLARGINE 32 UNITS: 100 INJECTION, SOLUTION SUBCUTANEOUS at 08:28

## 2019-01-20 RX ADMIN — INSULIN LISPRO 12 UNITS: 100 INJECTION, SOLUTION INTRAVENOUS; SUBCUTANEOUS at 17:40

## 2019-01-20 RX ADMIN — INSULIN LISPRO 6 UNITS: 100 INJECTION, SOLUTION INTRAVENOUS; SUBCUTANEOUS at 08:38

## 2019-01-20 RX ADMIN — PREGABALIN 75 MG: 75 CAPSULE ORAL at 08:29

## 2019-01-20 RX ADMIN — PREGABALIN 75 MG: 75 CAPSULE ORAL at 19:41

## 2019-01-20 RX ADMIN — INSULIN LISPRO 3 UNITS: 100 INJECTION, SOLUTION INTRAVENOUS; SUBCUTANEOUS at 08:29

## 2019-01-20 RX ADMIN — ALPRAZOLAM 0.5 MG: 0.5 TABLET ORAL at 21:25

## 2019-01-20 RX ADMIN — SODIUM CHLORIDE, PRESERVATIVE FREE 3 ML: 5 INJECTION INTRAVENOUS at 19:42

## 2019-01-20 RX ADMIN — DULOXETINE HYDROCHLORIDE 90 MG: 60 CAPSULE, DELAYED RELEASE ORAL at 08:29

## 2019-01-20 RX ADMIN — RIFAXIMIN 550 MG: 550 TABLET ORAL at 08:29

## 2019-01-20 RX ADMIN — RIFAXIMIN 550 MG: 550 TABLET ORAL at 19:41

## 2019-01-20 RX ADMIN — INSULIN LISPRO 5 UNITS: 100 INJECTION, SOLUTION INTRAVENOUS; SUBCUTANEOUS at 17:39

## 2019-01-20 RX ADMIN — INSULIN GLARGINE 10 UNITS: 100 INJECTION, SOLUTION SUBCUTANEOUS at 11:34

## 2019-01-20 RX ADMIN — TRIMETHOBENZAMIDE HYDROCHLORIDE 300 MG: 300 CAPSULE ORAL at 08:29

## 2019-01-20 RX ADMIN — TRIMETHOBENZAMIDE HYDROCHLORIDE 300 MG: 300 CAPSULE ORAL at 19:40

## 2019-01-20 RX ADMIN — ALPRAZOLAM 0.5 MG: 0.5 TABLET ORAL at 03:09

## 2019-01-20 RX ADMIN — INSULIN LISPRO 3 UNITS: 100 INJECTION, SOLUTION INTRAVENOUS; SUBCUTANEOUS at 08:30

## 2019-01-20 RX ADMIN — ONDANSETRON 4 MG: 2 INJECTION INTRAMUSCULAR; INTRAVENOUS at 03:09

## 2019-01-20 RX ADMIN — LEVETIRACETAM 500 MG: 500 TABLET, FILM COATED ORAL at 19:41

## 2019-01-20 RX ADMIN — AMITRIPTYLINE HYDROCHLORIDE 50 MG: 50 TABLET, FILM COATED ORAL at 19:41

## 2019-01-20 RX ADMIN — ACETAMINOPHEN 650 MG: 325 TABLET, FILM COATED ORAL at 21:25

## 2019-01-20 RX ADMIN — ONDANSETRON 4 MG: 2 INJECTION INTRAMUSCULAR; INTRAVENOUS at 19:41

## 2019-01-20 RX ADMIN — ACETAMINOPHEN 650 MG: 325 TABLET, FILM COATED ORAL at 11:37

## 2019-01-20 RX ADMIN — PANTOPRAZOLE SODIUM 40 MG: 40 TABLET, DELAYED RELEASE ORAL at 08:29

## 2019-01-20 RX ADMIN — LEVETIRACETAM 500 MG: 500 TABLET, FILM COATED ORAL at 08:29

## 2019-01-20 NOTE — PROGRESS NOTES
" LOS: 3 days     Name: Silvia Zabala  Age: 58 y.o.  Sex: female  :  1960  MRN: [id number]         Primary Care Physician: Blanca Pitts MD    Subjective   Subjective  No new complaints.  Denies N/V.  Tolerating diet.    Objective   Vital Signs  Temp:  [98.3 °F (36.8 °C)-98.6 °F (37 °C)] 98.6 °F (37 °C)  Heart Rate:  [] 89  Resp:  [18-20] 18  BP: (116-134)/(62-77) 116/62  Body mass index is 28.94 kg/m².    Objective:  General Appearance:  Comfortable and in no acute distress.    Vital signs: (most recent): Blood pressure 116/62, pulse 89, temperature 98.6 °F (37 °C), temperature source Oral, resp. rate 18, height 168.9 cm (66.5\"), weight 82.6 kg (182 lb), SpO2 98 %.    Lungs:  Normal effort and normal respiratory rate.    Heart: Normal rate.  Regular rhythm.    Abdomen: Abdomen is soft.  Bowel sounds are normal.   There is no abdominal tenderness.     Extremities: There is no dependent edema or local swelling.    Neurological: Patient is alert and oriented to person, place and time.    Skin:  Warm and dry.              Results Review:       I reviewed the patient's new clinical results.    Results from last 7 days   Lab Units 19  0545 19  0658 19  0532 19  1259   WBC 10*3/mm3 1.78* 1.65* 2.00* 2.90*   HEMOGLOBIN g/dL 10.1* 10.3* 10.3* 11.8*   PLATELETS 10*3/mm3 54* 59* 68* 107*     Results from last 7 days   Lab Units 19  0545 19  0658 19  0532 19  1259   SODIUM mmol/L 139 140 138 129*   POTASSIUM mmol/L 3.8 3.8 3.4* 4.1   CHLORIDE mmol/L 105 108* 104 88*   CO2 mmol/L 21.5* 19.8* 22.1 22.3   BUN mg/dL 8 9 11 17   CREATININE mg/dL 0.73 0.72 0.76 1.13*   CALCIUM mg/dL 9.1 8.7 8.6 9.6   GLUCOSE mg/dL 267* 208* 157* 633*     Results from last 7 days   Lab Units 19  1259   INR  1.27*             Scheduled Meds:     amitriptyline 50 mg Oral Nightly   DULoxetine 90 mg Oral Daily   folic acid 1,000 mcg Oral Daily   insulin glargine 36 Units Subcutaneous " Q12H   insulin lispro 0-12 Units Subcutaneous 4x Daily With Meals & Nightly   insulin lispro 12 Units Subcutaneous TID With Meals   levETIRAcetam 500 mg Oral BID   pantoprazole 40 mg Oral Daily   pregabalin 75 mg Oral Q12H   rifaximin 550 mg Oral BID   sodium chloride 3 mL Intravenous Q12H   trimethobenzamide 300 mg Oral TID     PRN Meds:   •  acetaminophen  •  ALPRAZolam  •  cyclobenzaprine  •  dextrose  •  dextrose  •  glucagon (human recombinant)  •  ondansetron  •  [COMPLETED] Insert peripheral IV **AND** sodium chloride  •  sodium chloride  Continuous Infusions:       Assessment/Plan   Active Hospital Problems    Diagnosis Date Noted   • **Hyperglycemia [R73.9] 01/17/2019   • Dehydration [E86.0] 01/17/2019   • Type 2 diabetes mellitus with hyperglycemia, with long-term current use of insulin (CMS/HCC) [E11.65, Z79.4] 12/13/2018   • Gastroparesis [K31.84] 10/17/2017   • DUKES (nonalcoholic steatohepatitis) [K75.81] 06/14/2016   • Nausea & vomiting [R11.2] 06/14/2016   • Hypersplenism [D73.1] 04/12/2016   • Pancytopenia (CMS/HCC) [D61.818] 04/12/2016      Resolved Hospital Problems   No resolved problems to display.       Assessment & Plan    -  Nausea and vomiting have improved and she is tolerating a bland diet.  Her gastroparesis is settling down with better blood sugar control.  Continue oral regimen of medications for her gastroparesis.    - Endocrinology adjusting insulin today.  BG >400 last night.  Will wait until she has more consistent control before discharging so not to invite worsening of gastroparesis which will certainly send her back to the ER.  - Her pancytopenia is chronic due to hypersplenism and cirrhosis.  Continue to monitor, particularly Plts  - SCDs for DVT proph          Wesly Walsh MD  [facility]  01/20/19  12:13 PM

## 2019-01-20 NOTE — PLAN OF CARE
Problem: Patient Care Overview  Goal: Plan of Care Review  Outcome: Ongoing (interventions implemented as appropriate)   01/20/19 0326 01/20/19 3779   Coping/Psychosocial   Plan of Care Reviewed With --  patient   Plan of Care Review   Progress --  no change   OTHER   Outcome Summary Pt a/o x 4 , up with standby assist , NSR , GI consult completed , ENdocrine following and pt is on SSI and Lantus 36 units /q12hr ,  Complaint of abd pain and nausea at times , tylenol and zofran as needed ,  VSS. Will continue to monitor. --        Problem: Hyperglycemia, Persistent (Adult)  Goal: Signs and Symptoms of Listed Potential Problems Will be Absent, Minimized or Managed (Hyperglycemia, Persistent)  Outcome: Ongoing (interventions implemented as appropriate)      Problem: Fall Risk (Adult)  Goal: Absence of Fall  Outcome: Ongoing (interventions implemented as appropriate)

## 2019-01-20 NOTE — PROGRESS NOTES
[facility]/[place]     Inpatient Follow Up Note    Patient Identification:  Name: Silvia Zabala  Age: 58 y.o.  Sex: female  :  1960  MRN: [id number]    Information from:patient     CC: N/V    History:   Did well this AM, now feels nauseated and bloated with lunch.    Review of Systems:  Constitutional:  Weak  Cardiovascular:  Negative   Respiratory:  Negative             Problem List:  Patient Active Problem List    Diagnosis   • *Hyperglycemia [R73.9]   • Dehydration [E86.0]   • Vitamin D insufficiency [E55.9]   • Type 2 diabetes mellitus with hyperglycemia, with long-term current use of insulin (CMS/HCC) [E11.65, Z79.4]   • BRICE (obstructive sleep apnea) [G47.33]   • Gastroparesis [K31.84]   • Hyperammonemia (CMS/HCC) [E72.20]   • Hyponatremia [E87.1]   • Anemia [D64.9]   • Migraine without aura [G43.009]   • Urinary retention [R33.9]   • UGI bleed [K92.2]   • Intractable vomiting with nausea [R11.2]   • Degeneration of lumbosacral intervertebral disc [M51.37]   • Seizure (CMS/HCC) [R56.9]   • Spondylosis without myelopathy [M47.819]   • Acute ITP (CMS/HCC) [D69.3]   • Fever [R50.9]   • Thrombocytopenia (CMS/HCC) [D69.6]   • Hepatic encephalopathy (CMS/HCC) [K72.90]   • Abnormal finding of blood chemistry  [R79.9]   • Diabetic peripheral neuropathy (CMS/HCC) [E11.42]   • History of seizure disorder [Z86.69]   • Diabetic ketoacidosis without coma associated with type 2 diabetes mellitus (CMS/HCC) [E11.10]   • Cirrhosis of liver with ascites (CMS/HCC) [K74.60, R18.8]   • Gastroparesis [K31.84]   • Portal hypertension (CMS/HCC) [K76.6]   • Systemic lupus (CMS/HCC) [M32.9]   • Secondary esophageal varices without bleeding (CMS/HCC) [I85.10]   • Ascites [R18.8]   • Hematemesis [K92.0]   • Abdominal pain [R10.9]   • Hyperlipidemia [E78.5]   • Nausea & vomiting [R11.2]   • DUKES (nonalcoholic steatohepatitis) [K75.81]   • Pancytopenia (CMS/HCC) [D61.818]   • Hypersplenism [D73.1]   • Type  "2 diabetes mellitus, uncontrolled, with neuropathy (CMS/HCC) [E11.40, E11.65]   • Fibrositis [M79.7]   • Change in blood platelet count [FYQ3673]   • Cirrhosis of liver (CMS/HCC) [K74.60]   • Rheumatoid arthritis (CMS/HCC) [M06.9]   • Anxiety and depression [F41.9, F32.9]     Current Meds:  MAR Reviewed  Scheduled Meds:  amitriptyline 50 mg Oral Nightly   DULoxetine 90 mg Oral Daily   folic acid 1,000 mcg Oral Daily   insulin glargine 36 Units Subcutaneous Q12H   insulin lispro 0-12 Units Subcutaneous 4x Daily With Meals & Nightly   insulin lispro 12 Units Subcutaneous TID With Meals   levETIRAcetam 500 mg Oral BID   pantoprazole 40 mg Oral Daily   pregabalin 75 mg Oral Q12H   rifaximin 550 mg Oral BID   sodium chloride 3 mL Intravenous Q12H   trimethobenzamide 300 mg Oral TID     Continuous Infusions:   PRN Meds:.•  acetaminophen  •  ALPRAZolam  •  cyclobenzaprine  •  dextrose  •  dextrose  •  glucagon (human recombinant)  •  ondansetron  •  [COMPLETED] Insert peripheral IV **AND** sodium chloride  •  sodium chloride  Allergies:  Allergies   Allergen Reactions   • Albuterol Anaphylaxis   • Tramadol Nausea Only, Other (See Comments) and GI Intolerance     Per pt causes her \"palsy, meaning shaking and tremors\"    • Lactulose Nausea And Vomiting and Other (See Comments)     Severe abdominal pain   • Quinine Derivatives Nausea And Vomiting       Intake/Output:     Intake/Output Summary (Last 24 hours) at 2019 1417  Last data filed at 2019 0900  Gross per 24 hour   Intake --   Output 350 ml   Net -350 ml     New allergies/reactions:  None    Physical Exam:  Vitals:   Temp (24hrs), Av.5 °F (36.9 °C), Min:98.5 °F (36.9 °C), Max:98.6 °F (37 °C)    Temp:  [98.5 °F (36.9 °C)-98.6 °F (37 °C)] 98.6 °F (37 °C)  Heart Rate:  [] 89  Resp:  [18-20] 18  BP: (116-134)/(62-77) 116/62  /62 (BP Location: Left arm, Patient Position: Lying)   Pulse 89   Temp 98.6 °F (37 °C) (Oral)   Resp 18   Ht 168.9 cm " "(66.5\")   Wt 82.6 kg (182 lb)   SpO2 98%   BMI 28.94 kg/m²     Exam:  NAD  PERRLA. Sclerae and conjunctivae normal  HENT: external inspection normal. Hearing intact.  No respiratory distress.   Abdomen: mild distention and tenderness, consistent with prior examinations.   Alert, oriented, normal affect.         DATA:  Radiology and Labs:   Recent Results (from the past 24 hour(s))   POC Glucose Once    Collection Time: 01/19/19  5:03 PM   Result Value Ref Range    Glucose 297 (H) 70 - 130 mg/dL   POC Glucose Once    Collection Time: 01/19/19  8:29 PM   Result Value Ref Range    Glucose 445 (H) 70 - 130 mg/dL   POC Glucose Once    Collection Time: 01/19/19  9:02 PM   Result Value Ref Range    Glucose 440 (H) 70 - 130 mg/dL   Basic Metabolic Panel    Collection Time: 01/20/19  5:45 AM   Result Value Ref Range    Glucose 267 (H) 65 - 99 mg/dL    BUN 8 6 - 20 mg/dL    Creatinine 0.73 0.57 - 1.00 mg/dL    Sodium 139 136 - 145 mmol/L    Potassium 3.8 3.5 - 5.2 mmol/L    Chloride 105 98 - 107 mmol/L    CO2 21.5 (L) 22.0 - 29.0 mmol/L    Calcium 9.1 8.6 - 10.5 mg/dL    eGFR Non African Amer 82 >60 mL/min/1.73    BUN/Creatinine Ratio 11.0 7.0 - 25.0    Anion Gap 12.5 mmol/L   CBC Auto Differential    Collection Time: 01/20/19  5:45 AM   Result Value Ref Range    WBC 1.78 (C) 4.50 - 10.70 10*3/mm3    RBC 3.76 (L) 3.90 - 5.20 10*6/mm3    Hemoglobin 10.1 (L) 11.9 - 15.5 g/dL    Hematocrit 31.8 (L) 35.6 - 45.5 %    MCV 84.6 80.5 - 98.2 fL    MCH 26.9 26.9 - 32.0 pg    MCHC 31.8 (L) 32.4 - 36.3 g/dL    RDW 15.4 (H) 11.7 - 13.0 %    RDW-SD 47.6 37.0 - 54.0 fl    MPV 10.2 6.0 - 12.0 fL    Platelets 54 (L) 140 - 500 10*3/mm3    Neutrophil % 59.0 42.7 - 76.0 %    Lymphocyte % 23.0 19.6 - 45.3 %    Monocyte % 12.9 (H) 5.0 - 12.0 %    Eosinophil % 5.1 0.3 - 6.2 %    Basophil % 0.0 0.0 - 1.5 %    Immature Grans % 0.0 0.0 - 0.5 %    Neutrophils, Absolute 1.05 (L) 1.90 - 8.10 10*3/mm3    Lymphocytes, Absolute 0.41 (L) 0.90 - 4.80 " 10*3/mm3    Monocytes, Absolute 0.23 0.20 - 1.20 10*3/mm3    Eosinophils, Absolute 0.09 0.00 - 0.70 10*3/mm3    Basophils, Absolute 0.00 0.00 - 0.20 10*3/mm3    Immature Grans, Absolute 0.00 0.00 - 0.03 10*3/mm3   POC Glucose Once    Collection Time: 01/20/19  7:35 AM   Result Value Ref Range    Glucose 246 (H) 70 - 130 mg/dL   POC Glucose Once    Collection Time: 01/20/19 11:19 AM   Result Value Ref Range    Glucose 415 (H) 70 - 130 mg/dL   POC Glucose Once    Collection Time: 01/20/19  1:46 PM   Result Value Ref Range    Glucose 413 (H) 70 - 130 mg/dL       Assessment:   Problem List:     Hyperglycemia    Pancytopenia (CMS/HCC)    Hypersplenism    Nausea & vomiting    DUKES (nonalcoholic steatohepatitis)    Gastroparesis    Type 2 diabetes mellitus with hyperglycemia, with long-term current use of insulin (CMS/HCC)    Dehydration    She may have overdid it with breakfast. Will see how she does the rest of the day.     Plan:   Continue current management and careful monitoring.         Rich Coleman MD  [facility]/[place]  1/20/2019

## 2019-01-20 NOTE — PROGRESS NOTES
"  ENDOCRINE    Subjective   AND PLANS  Silvia Zabala is a 58 y.o. female.     Follow-up diabetes    Patient started on solid food yesterday.  No nausea or vomiting.  Fasting glucose 246.  Random glucose 297-445.  Will continue Lantus 10 units extra this morning and increase Lantus to 36 units twice a day.  Increase Humalog to 12 units with each meal plus one unit per 40 greater than 150.    Objective   /62 (BP Location: Left arm, Patient Position: Lying)   Pulse 89   Temp 98.6 °F (37 °C) (Oral)   Resp 18   Ht 168.9 cm (66.5\")   Wt 82.6 kg (182 lb)   SpO2 98%   BMI 28.94 kg/m²   Physical Exam    Awake, alert, not in distress.  No rales or wheezes.  Regular heart rate and rhythm.  Abdomen soft, nontender.  No cyanosis or pedal edema.  No calf tenderness.    Lab Results (last 24 hours)     Procedure Component Value Units Date/Time    POC Glucose Once [[id number]]  (Abnormal) Collected:  01/20/19 0735    Specimen:  Blood Updated:  01/20/19 0736     Glucose 246 mg/dL     Basic Metabolic Panel [[id number]]  (Abnormal) Collected:  01/20/19 0545    Specimen:  Blood Updated:  01/20/19 0727     Glucose 267 mg/dL      BUN 8 mg/dL      Creatinine 0.73 mg/dL      Sodium 139 mmol/L      Potassium 3.8 mmol/L      Chloride 105 mmol/L      CO2 21.5 mmol/L      Calcium 9.1 mg/dL      eGFR Non African Amer 82 mL/min/1.73      BUN/Creatinine Ratio 11.0     Anion Gap 12.5 mmol/L     Narrative:       GFR Normal >60  Chronic Kidney Disease <60  Kidney Failure <15    CBC & Differential [[id number]] Collected:  01/20/19 0545    Specimen:  Blood Updated:  01/20/19 0720    Narrative:       The following orders were created for panel order CBC & Differential.  Procedure                               Abnormality         Status                     ---------                               -----------         ------                     CBC Auto Differential[[id number]]        Abnormal            Final result                 Please view " results for these tests on the individual orders.    CBC Auto Differential [[id number]]  (Abnormal) Collected:  01/20/19 0545    Specimen:  Blood Updated:  01/20/19 0720     WBC 1.78 10*3/mm3      RBC 3.76 10*6/mm3      Hemoglobin 10.1 g/dL      Hematocrit 31.8 %      MCV 84.6 fL      MCH 26.9 pg      MCHC 31.8 g/dL      RDW 15.4 %      RDW-SD 47.6 fl      MPV 10.2 fL      Platelets 54 10*3/mm3      Neutrophil % 59.0 %      Lymphocyte % 23.0 %      Monocyte % 12.9 %      Eosinophil % 5.1 %      Basophil % 0.0 %      Immature Grans % 0.0 %      Neutrophils, Absolute 1.05 10*3/mm3      Lymphocytes, Absolute 0.41 10*3/mm3      Monocytes, Absolute 0.23 10*3/mm3      Eosinophils, Absolute 0.09 10*3/mm3      Basophils, Absolute 0.00 10*3/mm3      Immature Grans, Absolute 0.00 10*3/mm3     POC Glucose Once [[id number]]  (Abnormal) Collected:  01/19/19 2102    Specimen:  Blood Updated:  01/19/19 2104     Glucose 440 mg/dL     POC Glucose Once [[id number]]  (Abnormal) Collected:  01/19/19 2029    Specimen:  Blood Updated:  01/19/19 2032     Glucose 445 mg/dL     POC Glucose Once [[id number]]  (Abnormal) Collected:  01/19/19 1703    Specimen:  Blood Updated:  01/19/19 1704     Glucose 297 mg/dL     POC Glucose Once [[id number]]  (Abnormal) Collected:  01/19/19 1144    Specimen:  Blood Updated:  01/19/19 1147     Glucose 263 mg/dL               Hyperglycemia    Pancytopenia (CMS/HCC)    Hypersplenism    Nausea & vomiting    DUKES (nonalcoholic steatohepatitis)    Gastroparesis    Type 2 diabetes mellitus with hyperglycemia, with long-term current use of insulin (CMS/HCC)    Dehydration    Insulin therapy as discussed above.

## 2019-01-20 NOTE — PLAN OF CARE
Problem: Patient Care Overview  Goal: Plan of Care Review  Outcome: Ongoing (interventions implemented as appropriate)   01/20/19 0326   Coping/Psychosocial   Plan of Care Reviewed With patient   Plan of Care Review   Progress improving   OTHER   Outcome Summary Continues to c/o pain, anxiety and nausea frequently. Sleeping occassionally, but in recliner. VSS. Will continue to monitor.     Goal: Individualization and Mutuality  Outcome: Ongoing (interventions implemented as appropriate)    Goal: Discharge Needs Assessment  Outcome: Ongoing (interventions implemented as appropriate)      Problem: Hyperglycemia, Persistent (Adult)  Goal: Signs and Symptoms of Listed Potential Problems Will be Absent, Minimized or Managed (Hyperglycemia, Persistent)  Outcome: Ongoing (interventions implemented as appropriate)      Problem: Fall Risk (Adult)  Goal: Absence of Fall  Outcome: Ongoing (interventions implemented as appropriate)

## 2019-01-21 LAB
ANION GAP SERPL CALCULATED.3IONS-SCNC: 9.6 MMOL/L
BASOPHILS # BLD AUTO: 0.01 10*3/MM3 (ref 0–0.2)
BASOPHILS NFR BLD AUTO: 0.5 % (ref 0–1.5)
BUN BLD-MCNC: 10 MG/DL (ref 6–20)
BUN/CREAT SERPL: 13 (ref 7–25)
CALCIUM SPEC-SCNC: 9.2 MG/DL (ref 8.6–10.5)
CHLORIDE SERPL-SCNC: 106 MMOL/L (ref 98–107)
CO2 SERPL-SCNC: 25.4 MMOL/L (ref 22–29)
CREAT BLD-MCNC: 0.77 MG/DL (ref 0.57–1)
DEPRECATED RDW RBC AUTO: 48.2 FL (ref 37–54)
EOSINOPHIL # BLD AUTO: 0.08 10*3/MM3 (ref 0–0.7)
EOSINOPHIL NFR BLD AUTO: 4.1 % (ref 0.3–6.2)
ERYTHROCYTE [DISTWIDTH] IN BLOOD BY AUTOMATED COUNT: 15.6 % (ref 11.7–13)
FERRITIN SERPL-MCNC: 29.8 NG/ML (ref 13–150)
FOLATE SERPL-MCNC: >20 NG/ML (ref 4.78–24.2)
GFR SERPL CREATININE-BSD FRML MDRD: 78 ML/MIN/1.73
GLUCOSE BLD-MCNC: 185 MG/DL (ref 65–99)
GLUCOSE BLDC GLUCOMTR-MCNC: 168 MG/DL (ref 70–130)
GLUCOSE BLDC GLUCOMTR-MCNC: 189 MG/DL (ref 70–130)
GLUCOSE BLDC GLUCOMTR-MCNC: 198 MG/DL (ref 70–130)
GLUCOSE BLDC GLUCOMTR-MCNC: 219 MG/DL (ref 70–130)
GLUCOSE BLDC GLUCOMTR-MCNC: 228 MG/DL (ref 70–130)
GLUCOSE BLDC GLUCOMTR-MCNC: 355 MG/DL (ref 70–130)
HCT VFR BLD AUTO: 32.6 % (ref 35.6–45.5)
HGB BLD-MCNC: 10.3 G/DL (ref 11.9–15.5)
IMM GRANULOCYTES # BLD AUTO: 0 10*3/MM3 (ref 0–0.03)
IMM GRANULOCYTES NFR BLD AUTO: 0 % (ref 0–0.5)
IRON 24H UR-MRATE: 28 MCG/DL (ref 37–145)
IRON SATN MFR SERPL: 6 % (ref 20–50)
LYMPHOCYTES # BLD AUTO: 0.51 10*3/MM3 (ref 0.9–4.8)
LYMPHOCYTES NFR BLD AUTO: 26.3 % (ref 19.6–45.3)
MCH RBC QN AUTO: 27 PG (ref 26.9–32)
MCHC RBC AUTO-ENTMCNC: 31.6 G/DL (ref 32.4–36.3)
MCV RBC AUTO: 85.3 FL (ref 80.5–98.2)
MONOCYTES # BLD AUTO: 0.25 10*3/MM3 (ref 0.2–1.2)
MONOCYTES NFR BLD AUTO: 12.9 % (ref 5–12)
NEUTROPHILS # BLD AUTO: 1.09 10*3/MM3 (ref 1.9–8.1)
NEUTROPHILS NFR BLD AUTO: 56.2 % (ref 42.7–76)
PLATELET # BLD AUTO: 50 10*3/MM3 (ref 140–500)
PLATELET # BLD AUTO: 53 10*3/MM3 (ref 140–500)
PLATELETS.RETICULATED NFR BLD AUTO: 7.2 % (ref 0.9–6.5)
PMV BLD AUTO: 9.9 FL (ref 6–12)
POTASSIUM BLD-SCNC: 3.8 MMOL/L (ref 3.5–5.2)
RBC # BLD AUTO: 3.82 10*6/MM3 (ref 3.9–5.2)
SODIUM BLD-SCNC: 141 MMOL/L (ref 136–145)
TIBC SERPL-MCNC: 459 MCG/DL
TRANSFERRIN SERPL-MCNC: 308 MG/DL (ref 200–360)
VIT B12 BLD-MCNC: 1221 PG/ML (ref 211–946)
WBC NRBC COR # BLD: 1.94 10*3/MM3 (ref 4.5–10.7)

## 2019-01-21 PROCEDURE — 94799 UNLISTED PULMONARY SVC/PX: CPT

## 2019-01-21 PROCEDURE — 63710000001 INSULIN GLARGINE PER 5 UNITS: Performed by: INTERNAL MEDICINE

## 2019-01-21 PROCEDURE — 85025 COMPLETE CBC W/AUTO DIFF WBC: CPT | Performed by: INTERNAL MEDICINE

## 2019-01-21 PROCEDURE — 82746 ASSAY OF FOLIC ACID SERUM: CPT | Performed by: INTERNAL MEDICINE

## 2019-01-21 PROCEDURE — 82962 GLUCOSE BLOOD TEST: CPT

## 2019-01-21 PROCEDURE — 85055 RETICULATED PLATELET ASSAY: CPT | Performed by: INTERNAL MEDICINE

## 2019-01-21 PROCEDURE — 99232 SBSQ HOSP IP/OBS MODERATE 35: CPT | Performed by: INTERNAL MEDICINE

## 2019-01-21 PROCEDURE — 80048 BASIC METABOLIC PNL TOTAL CA: CPT | Performed by: INTERNAL MEDICINE

## 2019-01-21 PROCEDURE — 84466 ASSAY OF TRANSFERRIN: CPT | Performed by: INTERNAL MEDICINE

## 2019-01-21 PROCEDURE — 82728 ASSAY OF FERRITIN: CPT | Performed by: INTERNAL MEDICINE

## 2019-01-21 PROCEDURE — 82607 VITAMIN B-12: CPT | Performed by: INTERNAL MEDICINE

## 2019-01-21 PROCEDURE — 63710000001 INSULIN LISPRO (HUMAN) PER 5 UNITS: Performed by: INTERNAL MEDICINE

## 2019-01-21 PROCEDURE — 25010000002 ONDANSETRON PER 1 MG: Performed by: INTERNAL MEDICINE

## 2019-01-21 PROCEDURE — 99253 IP/OBS CNSLTJ NEW/EST LOW 45: CPT | Performed by: INTERNAL MEDICINE

## 2019-01-21 PROCEDURE — 83540 ASSAY OF IRON: CPT | Performed by: INTERNAL MEDICINE

## 2019-01-21 RX ORDER — INSULIN GLARGINE 100 [IU]/ML
38 INJECTION, SOLUTION SUBCUTANEOUS EVERY 12 HOURS SCHEDULED
Status: DISCONTINUED | OUTPATIENT
Start: 2019-01-21 | End: 2019-01-22

## 2019-01-21 RX ADMIN — TRIMETHOBENZAMIDE HYDROCHLORIDE 300 MG: 300 CAPSULE ORAL at 17:12

## 2019-01-21 RX ADMIN — INSULIN LISPRO 5 UNITS: 100 INJECTION, SOLUTION INTRAVENOUS; SUBCUTANEOUS at 13:34

## 2019-01-21 RX ADMIN — PANTOPRAZOLE SODIUM 40 MG: 40 TABLET, DELAYED RELEASE ORAL at 06:10

## 2019-01-21 RX ADMIN — ONDANSETRON 4 MG: 2 INJECTION INTRAMUSCULAR; INTRAVENOUS at 17:11

## 2019-01-21 RX ADMIN — POLYETHYLENE GLYCOL 3350 17 G: 17 POWDER, FOR SOLUTION ORAL at 09:45

## 2019-01-21 RX ADMIN — ACETAMINOPHEN 650 MG: 325 TABLET, FILM COATED ORAL at 09:45

## 2019-01-21 RX ADMIN — ACETAMINOPHEN 650 MG: 325 TABLET, FILM COATED ORAL at 14:28

## 2019-01-21 RX ADMIN — ALPRAZOLAM 0.5 MG: 0.5 TABLET ORAL at 20:33

## 2019-01-21 RX ADMIN — INSULIN LISPRO 18 UNITS: 100 INJECTION, SOLUTION INTRAVENOUS; SUBCUTANEOUS at 13:34

## 2019-01-21 RX ADMIN — TRIMETHOBENZAMIDE HYDROCHLORIDE 300 MG: 300 CAPSULE ORAL at 09:32

## 2019-01-21 RX ADMIN — AMITRIPTYLINE HYDROCHLORIDE 50 MG: 50 TABLET, FILM COATED ORAL at 20:29

## 2019-01-21 RX ADMIN — INSULIN LISPRO 2 UNITS: 100 INJECTION, SOLUTION INTRAVENOUS; SUBCUTANEOUS at 09:33

## 2019-01-21 RX ADMIN — RIFAXIMIN 550 MG: 550 TABLET ORAL at 09:32

## 2019-01-21 RX ADMIN — PREGABALIN 75 MG: 75 CAPSULE ORAL at 20:29

## 2019-01-21 RX ADMIN — FOLIC ACID 1000 MCG: 1 TABLET ORAL at 09:32

## 2019-01-21 RX ADMIN — LEVETIRACETAM 500 MG: 500 TABLET, FILM COATED ORAL at 09:32

## 2019-01-21 RX ADMIN — DULOXETINE HYDROCHLORIDE 90 MG: 60 CAPSULE, DELAYED RELEASE ORAL at 09:32

## 2019-01-21 RX ADMIN — ACETAMINOPHEN 650 MG: 325 TABLET, FILM COATED ORAL at 19:18

## 2019-01-21 RX ADMIN — INSULIN LISPRO 18 UNITS: 100 INJECTION, SOLUTION INTRAVENOUS; SUBCUTANEOUS at 17:12

## 2019-01-21 RX ADMIN — PREGABALIN 75 MG: 75 CAPSULE ORAL at 09:32

## 2019-01-21 RX ADMIN — RIFAXIMIN 550 MG: 550 TABLET ORAL at 20:29

## 2019-01-21 RX ADMIN — INSULIN LISPRO 2 UNITS: 100 INJECTION, SOLUTION INTRAVENOUS; SUBCUTANEOUS at 17:12

## 2019-01-21 RX ADMIN — CYCLOBENZAPRINE 5 MG: 10 TABLET, FILM COATED ORAL at 22:16

## 2019-01-21 RX ADMIN — INSULIN GLARGINE 36 UNITS: 100 INJECTION, SOLUTION SUBCUTANEOUS at 09:32

## 2019-01-21 RX ADMIN — INSULIN LISPRO 12 UNITS: 100 INJECTION, SOLUTION INTRAVENOUS; SUBCUTANEOUS at 09:32

## 2019-01-21 RX ADMIN — INSULIN GLARGINE 38 UNITS: 100 INJECTION, SOLUTION SUBCUTANEOUS at 20:38

## 2019-01-21 RX ADMIN — TRIMETHOBENZAMIDE HYDROCHLORIDE 300 MG: 300 CAPSULE ORAL at 20:29

## 2019-01-21 RX ADMIN — SODIUM CHLORIDE, PRESERVATIVE FREE 3 ML: 5 INJECTION INTRAVENOUS at 20:29

## 2019-01-21 RX ADMIN — LEVETIRACETAM 500 MG: 500 TABLET, FILM COATED ORAL at 20:29

## 2019-01-21 RX ADMIN — INSULIN LISPRO 1 UNITS: 100 INJECTION, SOLUTION INTRAVENOUS; SUBCUTANEOUS at 20:39

## 2019-01-21 RX ADMIN — ONDANSETRON 4 MG: 2 INJECTION INTRAMUSCULAR; INTRAVENOUS at 10:57

## 2019-01-21 NOTE — PLAN OF CARE
Problem: Patient Care Overview  Goal: Plan of Care Review  Outcome: Ongoing (interventions implemented as appropriate)   01/21/19 0408   Coping/Psychosocial   Plan of Care Reviewed With patient   Plan of Care Review   Progress no change   OTHER   Outcome Summary Took over pt's care at 11 pm. pt cont to c.o nausea, treated w prn medication w little relief. states she feels weaker. Slept little on recliner, up to bathroom w sba. VSS. will cont to monitor.      Goal: Individualization and Mutuality  Outcome: Ongoing (interventions implemented as appropriate)    Goal: Discharge Needs Assessment  Outcome: Ongoing (interventions implemented as appropriate)    Goal: Interprofessional Rounds/Family Conf  Outcome: Ongoing (interventions implemented as appropriate)      Problem: Hyperglycemia, Persistent (Adult)  Goal: Signs and Symptoms of Listed Potential Problems Will be Absent, Minimized or Managed (Hyperglycemia, Persistent)  Outcome: Ongoing (interventions implemented as appropriate)      Problem: Fall Risk (Adult)  Goal: Absence of Fall  Outcome: Ongoing (interventions implemented as appropriate)

## 2019-01-21 NOTE — PLAN OF CARE
Problem: Patient Care Overview  Goal: Plan of Care Review  Outcome: Ongoing (interventions implemented as appropriate)   01/21/19 1700   Coping/Psychosocial   Plan of Care Reviewed With patient   Plan of Care Review   Progress no change   OTHER   Outcome Summary Pt complains of more pain and nausea, IV zofran given, states doesn't feel well. CBC consulted per pt request. Will ct to monitor.        Problem: Hyperglycemia, Persistent (Adult)  Goal: Signs and Symptoms of Listed Potential Problems Will be Absent, Minimized or Managed (Hyperglycemia, Persistent)  Outcome: Ongoing (interventions implemented as appropriate)      Problem: Fall Risk (Adult)  Goal: Absence of Fall  Outcome: Ongoing (interventions implemented as appropriate)

## 2019-01-21 NOTE — PROGRESS NOTES
" LOS: 4 days     Name: Silvia Zabala  Age: 58 y.o.  Sex: female  :  1960  MRN: [id number]         Primary Care Physician: Blanca Pitts MD    Subjective   Subjective  Feeling more nauseated last night and this morning.  Also had more weakness and was a little lightheaded last night.  Has not had a bowel movement in 3-4 days.    Objective   Vital Signs  Temp:  [98.1 °F (36.7 °C)-98.8 °F (37.1 °C)] 98.5 °F (36.9 °C)  Heart Rate:  [] 91  Resp:  [16-18] 18  BP: (112-129)/(68-73) 112/68  Body mass index is 28.94 kg/m².    Objective:  General Appearance:  Comfortable and in no acute distress.    Vital signs: (most recent): Blood pressure 112/68, pulse 91, temperature 98.5 °F (36.9 °C), temperature source Oral, resp. rate 18, height 168.9 cm (66.5\"), weight 82.6 kg (182 lb), SpO2 96 %.    Lungs:  Normal effort and normal respiratory rate.    Heart: Normal rate.  Regular rhythm.    Abdomen: Abdomen is soft.  Bowel sounds are normal.   There is no abdominal tenderness.     Extremities: There is no dependent edema or local swelling.    Neurological: Patient is alert and oriented to person, place and time.    Skin:  Warm and dry.              Results Review:       I reviewed the patient's new clinical results.    Results from last 7 days   Lab Units 19  0554 19  0545 19  0658 19  0532 19  1259   WBC 10*3/mm3 1.94* 1.78* 1.65* 2.00* 2.90*   HEMOGLOBIN g/dL 10.3* 10.1* 10.3* 10.3* 11.8*   PLATELETS 10*3/mm3 53* 54* 59* 68* 107*     Results from last 7 days   Lab Units 19  0553 19  0545 19  0658 19  0532 19  1259   SODIUM mmol/L 141 139 140 138 129*   POTASSIUM mmol/L 3.8 3.8 3.8 3.4* 4.1   CHLORIDE mmol/L 106 105 108* 104 88*   CO2 mmol/L 25.4 21.5* 19.8* 22.1 22.3   BUN mg/dL 10 8 9 11 17   CREATININE mg/dL 0.77 0.73 0.72 0.76 1.13*   CALCIUM mg/dL 9.2 9.1 8.7 8.6 9.6   GLUCOSE mg/dL 185* 267* 208* 157* 633*     Results from last 7 days   Lab Units " 01/17/19  1259   INR  1.27*             Scheduled Meds:     amitriptyline 50 mg Oral Nightly   DULoxetine 90 mg Oral Daily   folic acid 1,000 mcg Oral Daily   insulin glargine 38 Units Subcutaneous Q12H   insulin lispro 0-12 Units Subcutaneous 4x Daily With Meals & Nightly   insulin lispro 18 Units Subcutaneous TID With Meals   levETIRAcetam 500 mg Oral BID   pantoprazole 40 mg Oral Daily   pregabalin 75 mg Oral Q12H   rifaximin 550 mg Oral BID   sodium chloride 3 mL Intravenous Q12H   trimethobenzamide 300 mg Oral TID     PRN Meds:   •  acetaminophen  •  ALPRAZolam  •  cyclobenzaprine  •  dextrose  •  dextrose  •  glucagon (human recombinant)  •  ondansetron  •  [COMPLETED] Insert peripheral IV **AND** sodium chloride  •  sodium chloride  Continuous Infusions:       Assessment/Plan   Active Hospital Problems    Diagnosis Date Noted   • **Hyperglycemia [R73.9] 01/17/2019   • Dehydration [E86.0] 01/17/2019   • Type 2 diabetes mellitus with hyperglycemia, with long-term current use of insulin (CMS/[facility]) [E11.65, Z79.4] 12/13/2018   • Gastroparesis [K31.84] 10/17/2017   • DUKES (nonalcoholic steatohepatitis) [K75.81] 06/14/2016   • Nausea & vomiting [R11.2] 06/14/2016   • Hypersplenism [D73.1] 04/12/2016   • Pancytopenia (CMS/[facility]) [D61.818] 04/12/2016      Resolved Hospital Problems   No resolved problems to display.       Assessment & Plan    -  Not feeling quite as well today she has past few days.  Increased nausea today but no vomiting.  Continue oral regimen of medications for her gastroparesis.    - Intensify her bowel regimen as constipation may be playing a role in her recurrence of nausea  - Endocrinology adjusting insulin and blood sugars look much better.  - Her pancytopenia is chronic due to hypersplenism and cirrhosis.  Continue to monitor, particularly Plts  - SCDs for DVT proph  - Appreciate assistance from consulting services.  - She can be discharged once GI symptoms and blood sugars are controlled.  I  feel if we discharge her too soon she is at high likelihood for readmission.      Wesly Walsh MD  [facility]  01/21/19  12:28 PM

## 2019-01-21 NOTE — PROGRESS NOTES
58 y.o.   LOS: 4 days   Patient Care Team:  Blanca Pitts MD as PCP - General (Internal Medicine)  Sukhdeep Mcdowell MD as Consulting Physician (Hematology and Oncology)  Blanca Pitts MD as Referring Physician (Internal Medicine)  Rich Coleman MD as Consulting Physician (Gastroenterology)  Merary Burnett MD as Consulting Physician (Endocrinology)    Chief Complaint:  Elevated blood sugars    Chief Complaint   Patient presents with   • Hyperglycemia       Subjective   Patient is currently tolerating regular consistent carbohydrate diet.  She did have some nausea this morning but no further vomiting episodes.  Noted that her blood sugars were elevated over the weekend.  Blood sugar at lunchtime was around 300s.    Interval History:    Review of Systems:   Review of Systems   Constitutional: Positive for appetite change and fatigue. Negative for fever.   Respiratory: Negative for shortness of breath.    Cardiovascular: Negative for chest pain.   Gastrointestinal: Positive for abdominal distention and nausea. Negative for diarrhea and vomiting.   Endocrine: Negative for polydipsia and polyuria.   Genitourinary: Negative for flank pain.   Musculoskeletal: Negative for arthralgias and myalgias.   Skin: Negative for pallor.   Neurological: Positive for weakness and numbness.   Psychiatric/Behavioral: Negative for agitation.     Objective     Vital Signs   Temp:  [98.1 °F (36.7 °C)-98.8 °F (37.1 °C)] 98.5 °F (36.9 °C)  Heart Rate:  [] 91  Resp:  [16-18] 18  BP: (112-129)/(68-73) 112/68    Physical Exam:  Physical Exam   Constitutional: She appears well-nourished.   HENT:   Head: Normocephalic and atraumatic.   Eyes: Conjunctivae and EOM are normal. No scleral icterus.   Neck: Normal range of motion. Neck supple. No thyromegaly present.   Cardiovascular: Normal rate and normal heart sounds. Exam reveals no friction rub.   No murmur heard.  Pulmonary/Chest: Effort normal and breath sounds normal. No stridor.  She has no wheezes. She has no rales.   Abdominal: Soft. Bowel sounds are normal. She exhibits distension. There is no tenderness.   Musculoskeletal: She exhibits no edema or tenderness.   Lymphadenopathy:     She has no cervical adenopathy.   Neurological: She is alert.   Skin: Skin is warm and dry. She is not diaphoretic.   Psychiatric: She has a normal mood and affect.   Vitals reviewed.  Results Review:     I reviewed the patient's new clinical results and summarized them in subjective and in plan.      Glucose   Date/Time Value Ref Range Status   01/21/2019 0553 185 (H) 65 - 99 mg/dL Final   01/20/2019 0545 267 (H) 65 - 99 mg/dL Final   01/19/2019 0658 208 (H) 65 - 99 mg/dL Final     Lab Results (last 24 hours)     Procedure Component Value Units Date/Time    POC Glucose Once [[id number]]  (Abnormal) Collected:  01/21/19 1201    Specimen:  Blood Updated:  01/21/19 1202     Glucose 355 mg/dL     POC Glucose Once [[id number]]  (Abnormal) Collected:  01/21/19 0714    Specimen:  Blood Updated:  01/21/19 0716     Glucose 198 mg/dL     CBC & Differential [[id number]] Collected:  01/21/19 0554    Specimen:  Blood Updated:  01/21/19 0639    Narrative:       The following orders were created for panel order CBC & Differential.  Procedure                               Abnormality         Status                     ---------                               -----------         ------                     CBC Auto Differential[[id number]]        Abnormal            Final result                 Please view results for these tests on the individual orders.    CBC Auto Differential [[id number]]  (Abnormal) Collected:  01/21/19 0554    Specimen:  Blood Updated:  01/21/19 0639     WBC 1.94 10*3/mm3      RBC 3.82 10*6/mm3      Hemoglobin 10.3 g/dL      Hematocrit 32.6 %      MCV 85.3 fL      MCH 27.0 pg      MCHC 31.6 g/dL      RDW 15.6 %      RDW-SD 48.2 fl      MPV 9.9 fL      Platelets 53 10*3/mm3      Neutrophil % 56.2 %       Lymphocyte % 26.3 %      Monocyte % 12.9 %      Eosinophil % 4.1 %      Basophil % 0.5 %      Immature Grans % 0.0 %      Neutrophils, Absolute 1.09 10*3/mm3      Lymphocytes, Absolute 0.51 10*3/mm3      Monocytes, Absolute 0.25 10*3/mm3      Eosinophils, Absolute 0.08 10*3/mm3      Basophils, Absolute 0.01 10*3/mm3      Immature Grans, Absolute 0.00 10*3/mm3     Basic Metabolic Panel [[id number]]  (Abnormal) Collected:  01/21/19 0553    Specimen:  Blood Updated:  01/21/19 0636     Glucose 185 mg/dL      BUN 10 mg/dL      Creatinine 0.77 mg/dL      Sodium 141 mmol/L      Potassium 3.8 mmol/L      Chloride 106 mmol/L      CO2 25.4 mmol/L      Calcium 9.2 mg/dL      eGFR Non African Amer 78 mL/min/1.73      BUN/Creatinine Ratio 13.0     Anion Gap 9.6 mmol/L     Narrative:       GFR Normal >60  Chronic Kidney Disease <60  Kidney Failure <15    POC Glucose Once [[id number]]  (Abnormal) Collected:  01/21/19 0054    Specimen:  Blood Updated:  01/21/19 0055     Glucose 219 mg/dL     POC Glucose Once [[id number]]  (Abnormal) Collected:  01/20/19 1939    Specimen:  Blood Updated:  01/20/19 1950     Glucose 316 mg/dL     POC Glucose Once [[id number]]  (Abnormal) Collected:  01/20/19 1723    Specimen:  Blood Updated:  01/20/19 1727     Glucose 257 mg/dL     POC Glucose Once [[id number]]  (Abnormal) Collected:  01/20/19 1346    Specimen:  Blood Updated:  01/20/19 1348     Glucose 413 mg/dL         Imaging Results (last 24 hours)     ** No results found for the last 24 hours. **          Medication Review: done      Current Facility-Administered Medications:   •  acetaminophen (TYLENOL) tablet 650 mg, 650 mg, Oral, Q4H PRN, Wesly Walsh MD, 650 mg at 01/21/19 0945  •  ALPRAZolam (XANAX) tablet 0.5 mg, 0.5 mg, Oral, BID PRN, Wesly Walsh MD, 0.5 mg at 01/20/19 9610  •  amitriptyline (ELAVIL) tablet 50 mg, 50 mg, Oral, Nightly, Wesly Walsh MD, 50 mg at 01/20/19 1941  •  cyclobenzaprine  (FLEXERIL) tablet 5 mg, 5 mg, Oral, Nightly PRN, Wesly Walsh MD, 5 mg at 01/20/19 2347  •  dextrose (D50W) 25 g/ 50mL Intravenous Solution 25 g, 25 g, Intravenous, Q15 Min PRN, Wesly Walsh MD  •  dextrose (GLUTOSE) oral gel 15 g, 15 g, Oral, Q15 Min PRN, Wesly Walsh MD  •  DULoxetine (CYMBALTA) DR capsule 90 mg, 90 mg, Oral, Daily, Wesly Walsh MD, 90 mg at 01/21/19 0932  •  folic acid (FOLVITE) tablet 1,000 mcg, 1,000 mcg, Oral, Daily, Wesly Walsh MD, 1,000 mcg at 01/21/19 0932  •  glucagon (human recombinant) (GLUCAGEN DIAGNOSTIC) injection 1 mg, 1 mg, Subcutaneous, PRN, Wesly Walsh MD  •  insulin glargine (LANTUS) injection 38 Units, 38 Units, Subcutaneous, Q12H, Merary Burnett MD  •  insulin lispro (humaLOG) injection 0-12 Units, 0-12 Units, Subcutaneous, 4x Daily With Meals & Nightly, Dionisio Altman MD, 2 Units at 01/21/19 0933  •  insulin lispro (humaLOG) injection 18 Units, 18 Units, Subcutaneous, TID With Meals, Merary Burnett MD  •  levETIRAcetam (KEPPRA) tablet 500 mg, 500 mg, Oral, BID, Wesly Walsh MD, 500 mg at 01/21/19 0932  •  ondansetron (ZOFRAN) injection 4 mg, 4 mg, Intravenous, Q6H PRN, Wesly Walsh MD, 4 mg at 01/21/19 1057  •  pantoprazole (PROTONIX) EC tablet 40 mg, 40 mg, Oral, Daily, Wesly Walsh MD, 40 mg at 01/21/19 0610  •  pregabalin (LYRICA) capsule 75 mg, 75 mg, Oral, Q12H, Wesly Walsh MD, 75 mg at 01/21/19 0932  •  rifaximin (XIFAXAN) tablet 550 mg, 550 mg, Oral, BID, Wesly Walsh MD, 550 mg at 01/21/19 0932  •  [COMPLETED] Insert peripheral IV, , , Once **AND** sodium chloride 0.9 % flush 10 mL, 10 mL, Intravenous, PRN, Daryn Rosa MD  •  sodium chloride 0.9 % flush 3 mL, 3 mL, Intravenous, Q12H, Wesly Walsh MD, 3 mL at 01/20/19 1942  •  sodium chloride 0.9 % flush 3-10 mL, 3-10 mL, Intravenous,  "PRN, Wesly Walsh MD  •  trimethobenzamide (TIGAN) capsule 300 mg, 300 mg, Oral, TID, Wesly Walsh MD, 300 mg at 01/21/19 0932    Assessment/Plan     Active Hospital Problems    Diagnosis Date Noted   • **Hyperglycemia [R73.9] 01/17/2019   • Dehydration [E86.0] 01/17/2019   • Type 2 diabetes mellitus with hyperglycemia, with long-term current use of insulin (CMS/[facility]) [E11.65, Z79.4] 12/13/2018   • Gastroparesis [K31.84] 10/17/2017   • DUKES (nonalcoholic steatohepatitis) [K75.81] 06/14/2016   • Nausea & vomiting [R11.2] 06/14/2016   • Hypersplenism [D73.1] 04/12/2016   • Pancytopenia (CMS/[facility]) [D61.818] 04/12/2016      Resolved Hospital Problems   No resolved problems to display.     Type 2 diabetes mellitus-uncontrolled, complicated with gastroparesis  Increase Lantus to 38 units twice daily  Increase Humalog to 18 units with each meal  Also increased the Humalog sliding scale to 3 times a day before meals and at bedtime-moderate dose.    Gastroparesis  GI on board.  Patient's glycemic control is extremely important in order to prevent her gastroparesis flares up.  Not a candidate for gastric pacemaker due to her ITP.    Discussed plan with Nurse.     Merary Burnett MD.  01/21/19  10:32 AM      EMR Dragon / transcription disclaimer:    \"Dictated utilizing Dragon dictation\".   "

## 2019-01-21 NOTE — PROGRESS NOTES
[facility]  Inpatient Progress Note    Reason for Follow Up:  Nausea/vomiting, gastroparesis    Subjective     Interval History:   Nauseated after taking Miralax this am.  No emesis    Current Facility-Administered Medications:   •  acetaminophen (TYLENOL) tablet 650 mg, 650 mg, Oral, Q4H PRN, Wesly Walsh MD, 650 mg at 01/20/19 2125  •  ALPRAZolam (XANAX) tablet 0.5 mg, 0.5 mg, Oral, BID PRN, Wesly Walsh MD, 0.5 mg at 01/20/19 2125  •  amitriptyline (ELAVIL) tablet 50 mg, 50 mg, Oral, Nightly, Wesly Walsh MD, 50 mg at 01/20/19 1941  •  cyclobenzaprine (FLEXERIL) tablet 5 mg, 5 mg, Oral, Nightly PRN, Wesly Walsh MD, 5 mg at 01/20/19 2347  •  dextrose (D50W) 25 g/ 50mL Intravenous Solution 25 g, 25 g, Intravenous, Q15 Min PRN, Wesly Walsh MD  •  dextrose (GLUTOSE) oral gel 15 g, 15 g, Oral, Q15 Min PRN, Wesly Walsh MD  •  DULoxetine (CYMBALTA) DR capsule 90 mg, 90 mg, Oral, Daily, Wesly Walsh MD, 90 mg at 01/20/19 0829  •  folic acid (FOLVITE) tablet 1,000 mcg, 1,000 mcg, Oral, Daily, Wesly Walsh MD, 1,000 mcg at 01/20/19 0829  •  glucagon (human recombinant) (GLUCAGEN DIAGNOSTIC) injection 1 mg, 1 mg, Subcutaneous, PRN, Wesly Wlash MD  •  insulin glargine (LANTUS) injection 36 Units, 36 Units, Subcutaneous, Q12H, Dionisio Altman MD, 36 Units at 01/20/19 2125  •  insulin lispro (humaLOG) injection 0-12 Units, 0-12 Units, Subcutaneous, 4x Daily With Meals & Nightly, Dionisio Altman MD, 5 Units at 01/20/19 2125  •  insulin lispro (humaLOG) injection 12 Units, 12 Units, Subcutaneous, TID With Meals, Dionisio Altman MD, 12 Units at 01/20/19 1740  •  levETIRAcetam (KEPPRA) tablet 500 mg, 500 mg, Oral, BID, Wesly Walsh MD, 500 mg at 01/20/19 1941  •  ondansetron (ZOFRAN) injection 4 mg, 4 mg, Intravenous, Q6H PRN, Wesly Walsh,  MD, 4 mg at 01/20/19 1941  •  pantoprazole (PROTONIX) EC tablet 40 mg, 40 mg, Oral, Daily, Wesly Walsh MD, 40 mg at 01/21/19 0610  •  pregabalin (LYRICA) capsule 75 mg, 75 mg, Oral, Q12H, Wesly Walsh MD, 75 mg at 01/20/19 1941  •  rifaximin (XIFAXAN) tablet 550 mg, 550 mg, Oral, BID, Wesly Walsh MD, 550 mg at 01/20/19 1941  •  [COMPLETED] Insert peripheral IV, , , Once **AND** sodium chloride 0.9 % flush 10 mL, 10 mL, Intravenous, PRN, Daryn Rosa MD  •  sodium chloride 0.9 % flush 3 mL, 3 mL, Intravenous, Q12H, Wesly Walsh MD, 3 mL at 01/20/19 1942  •  sodium chloride 0.9 % flush 3-10 mL, 3-10 mL, Intravenous, PRN, Wesly Walsh MD  •  trimethobenzamide (TIGAN) capsule 300 mg, 300 mg, Oral, TID, Wesly Walsh MD, 300 mg at 01/20/19 1940  Review of Systems:    The following systems were reviewed and negative;  constitution    Objective     Vital Signs  Temp:  [98.1 °F (36.7 °C)-98.8 °F (37.1 °C)] 98.5 °F (36.9 °C)  Heart Rate:  [] 91  Resp:  [16-18] 18  BP: (112-129)/(68-73) 112/68  Body mass index is 28.94 kg/m².    Intake/Output Summary (Last 24 hours) at 1/21/2019 0925  Last data filed at 1/20/2019 1700  Gross per 24 hour   Intake 240 ml   Output --   Net 240 ml     No intake/output data recorded.     Physical Exam:   General: patient awake, alert and cooperative   Cardiovascular: regular rhythm and rate, no murmurs auscultated   Pulm: clear to auscultation bilaterally, regular and unlabored   Abdomen: soft, nontender, nondistended; normal bowel sounds   Rectal: deferred   Extremities: no rash or edema   Psychiatric: Normal mood and behavior; memory intact     Results Review:     I reviewed the patient's new clinical results.    Results from last 7 days   Lab Units 01/21/19  0554 01/20/19  0545 01/19/19  0658   WBC 10*3/mm3 1.94* 1.78* 1.65*   HEMOGLOBIN g/dL 10.3* 10.1* 10.3*   HEMATOCRIT % 32.6* 31.8* 32.2*    PLATELETS 10*3/mm3 53* 54* 59*     Results from last 7 days   Lab Units 01/21/19  0553 01/20/19  0545 01/19/19  0658  01/17/19  1259   SODIUM mmol/L 141 139 140   < > 129*   POTASSIUM mmol/L 3.8 3.8 3.8   < > 4.1   CHLORIDE mmol/L 106 105 108*   < > 88*   CO2 mmol/L 25.4 21.5* 19.8*   < > 22.3   BUN mg/dL 10 8 9   < > 17   CREATININE mg/dL 0.77 0.73 0.72   < > 1.13*   CALCIUM mg/dL 9.2 9.1 8.7   < > 9.6   BILIRUBIN mg/dL  --   --   --   --  1.7*   ALK PHOS U/L  --   --   --   --  237*   ALT (SGPT) U/L  --   --   --   --  34*   AST (SGOT) U/L  --   --   --   --  28   GLUCOSE mg/dL 185* 267* 208*   < > 633*    < > = values in this interval not displayed.     Results from last 7 days   Lab Units 01/17/19  1259   INR  1.27*     Lab Results   Lab Value Date/Time    LIPASE 9 (L) 01/17/2019 1259    LIPASE 8 (L) 12/13/2018 1900    LIPASE 12 (L) 10/02/2018 2332    LIPASE 9 (L) 05/18/2018 0425    LIPASE 22 12/16/2017 1650    LIPASE 10 (L) 10/30/2017 1732    LIPASE 9 (L) 02/21/2017 1202    LIPASE 16 11/10/2014 0724         Assessment/Plan   Assessment:   1.  Gastroparesis -   2.  DUKES  3.  DM  4.  Pancytopenia    Plan:   Will give dulcolax suppository for constipation  Continue IV antiemtics  If nausea/vomiting persists, only option I can think to try would be oral e-mycin 30 minutes prior to meals  She will need close f/u with Dr Hope at  motility clinic, but unfortunately is not candidate for gastric stimulator due to her liver disease and hypersplenism.      I discussed the patients findings and my recommendations with patient.         Los Gao M.D.  [facility]  [address] [place] [id number]  Office: [phone]

## 2019-01-22 ENCOUNTER — TELEPHONE (OUTPATIENT)
Dept: GENERAL RADIOLOGY | Facility: HOSPITAL | Age: 59
End: 2019-01-22

## 2019-01-22 ENCOUNTER — RESULTS ENCOUNTER (OUTPATIENT)
Dept: ENDOCRINOLOGY | Age: 59
End: 2019-01-22

## 2019-01-22 VITALS
BODY MASS INDEX: 28.56 KG/M2 | SYSTOLIC BLOOD PRESSURE: 112 MMHG | WEIGHT: 182 LBS | HEIGHT: 67 IN | DIASTOLIC BLOOD PRESSURE: 65 MMHG | OXYGEN SATURATION: 95 % | HEART RATE: 83 BPM | RESPIRATION RATE: 16 BRPM | TEMPERATURE: 98.2 F

## 2019-01-22 DIAGNOSIS — IMO0002 TYPE 2 DIABETES MELLITUS, UNCONTROLLED, WITH NEUROPATHY: ICD-10-CM

## 2019-01-22 DIAGNOSIS — E11.42 DIABETIC PERIPHERAL NEUROPATHY (HCC): ICD-10-CM

## 2019-01-22 DIAGNOSIS — D50.9 IRON DEFICIENCY ANEMIA, UNSPECIFIED IRON DEFICIENCY ANEMIA TYPE: ICD-10-CM

## 2019-01-22 DIAGNOSIS — K31.84 GASTROPARESIS: ICD-10-CM

## 2019-01-22 DIAGNOSIS — IMO0001 ADVERSE EFFECT OF IRON OR ITS COMPOUND, SUBSEQUENT ENCOUNTER: ICD-10-CM

## 2019-01-22 DIAGNOSIS — K75.81 NASH (NONALCOHOLIC STEATOHEPATITIS): ICD-10-CM

## 2019-01-22 LAB
ANION GAP SERPL CALCULATED.3IONS-SCNC: 11.4 MMOL/L
BASOPHILS # BLD AUTO: 0.01 10*3/MM3 (ref 0–0.2)
BASOPHILS NFR BLD AUTO: 0.6 % (ref 0–1.5)
BUN BLD-MCNC: 11 MG/DL (ref 6–20)
BUN/CREAT SERPL: 15.7 (ref 7–25)
CALCIUM SPEC-SCNC: 8.9 MG/DL (ref 8.6–10.5)
CHLORIDE SERPL-SCNC: 105 MMOL/L (ref 98–107)
CO2 SERPL-SCNC: 22.6 MMOL/L (ref 22–29)
CREAT BLD-MCNC: 0.7 MG/DL (ref 0.57–1)
DEPRECATED RDW RBC AUTO: 48.8 FL (ref 37–54)
EOSINOPHIL # BLD AUTO: 0.07 10*3/MM3 (ref 0–0.7)
EOSINOPHIL NFR BLD AUTO: 4.1 % (ref 0.3–6.2)
ERYTHROCYTE [DISTWIDTH] IN BLOOD BY AUTOMATED COUNT: 15.5 % (ref 11.7–13)
GFR SERPL CREATININE-BSD FRML MDRD: 87 ML/MIN/1.73
GLUCOSE BLD-MCNC: 233 MG/DL (ref 65–99)
GLUCOSE BLDC GLUCOMTR-MCNC: 210 MG/DL (ref 70–130)
GLUCOSE BLDC GLUCOMTR-MCNC: 253 MG/DL (ref 70–130)
HCT VFR BLD AUTO: 32.6 % (ref 35.6–45.5)
HGB BLD-MCNC: 10.3 G/DL (ref 11.9–15.5)
IMM GRANULOCYTES # BLD AUTO: 0 10*3/MM3 (ref 0–0.03)
IMM GRANULOCYTES NFR BLD AUTO: 0 % (ref 0–0.5)
LYMPHOCYTES # BLD AUTO: 0.52 10*3/MM3 (ref 0.9–4.8)
LYMPHOCYTES NFR BLD AUTO: 30.2 % (ref 19.6–45.3)
MCH RBC QN AUTO: 27.1 PG (ref 26.9–32)
MCHC RBC AUTO-ENTMCNC: 31.6 G/DL (ref 32.4–36.3)
MCV RBC AUTO: 85.8 FL (ref 80.5–98.2)
MONOCYTES # BLD AUTO: 0.21 10*3/MM3 (ref 0.2–1.2)
MONOCYTES NFR BLD AUTO: 12.2 % (ref 5–12)
NEUTROPHILS # BLD AUTO: 0.91 10*3/MM3 (ref 1.9–8.1)
NEUTROPHILS NFR BLD AUTO: 52.9 % (ref 42.7–76)
PLATELET # BLD AUTO: 53 10*3/MM3 (ref 140–500)
PMV BLD AUTO: 11 FL (ref 6–12)
POTASSIUM BLD-SCNC: 4 MMOL/L (ref 3.5–5.2)
RBC # BLD AUTO: 3.8 10*6/MM3 (ref 3.9–5.2)
SODIUM BLD-SCNC: 139 MMOL/L (ref 136–145)
WBC NRBC COR # BLD: 1.72 10*3/MM3 (ref 4.5–10.7)

## 2019-01-22 PROCEDURE — 80048 BASIC METABOLIC PNL TOTAL CA: CPT | Performed by: INTERNAL MEDICINE

## 2019-01-22 PROCEDURE — 63710000001 INSULIN GLARGINE PER 5 UNITS: Performed by: INTERNAL MEDICINE

## 2019-01-22 PROCEDURE — 85025 COMPLETE CBC W/AUTO DIFF WBC: CPT | Performed by: INTERNAL MEDICINE

## 2019-01-22 PROCEDURE — 99232 SBSQ HOSP IP/OBS MODERATE 35: CPT | Performed by: INTERNAL MEDICINE

## 2019-01-22 PROCEDURE — 25010000002 ONDANSETRON PER 1 MG: Performed by: INTERNAL MEDICINE

## 2019-01-22 PROCEDURE — 82962 GLUCOSE BLOOD TEST: CPT

## 2019-01-22 PROCEDURE — 63710000001 INSULIN LISPRO (HUMAN) PER 5 UNITS: Performed by: INTERNAL MEDICINE

## 2019-01-22 PROCEDURE — 99231 SBSQ HOSP IP/OBS SF/LOW 25: CPT | Performed by: INTERNAL MEDICINE

## 2019-01-22 RX ORDER — PROMETHAZINE HYDROCHLORIDE 25 MG/1
25 TABLET ORAL EVERY 6 HOURS PRN
Qty: 30 TABLET | Refills: 0 | Status: SHIPPED | OUTPATIENT
Start: 2019-01-22 | End: 2021-01-18 | Stop reason: HOSPADM

## 2019-01-22 RX ORDER — PROCHLORPERAZINE MALEATE 10 MG
10 TABLET ORAL ONCE
Status: CANCELLED
Start: 2019-01-29 | End: 2019-01-29

## 2019-01-22 RX ORDER — INSULIN GLARGINE 100 [IU]/ML
40 INJECTION, SOLUTION SUBCUTANEOUS EVERY 12 HOURS SCHEDULED
Status: DISCONTINUED | OUTPATIENT
Start: 2019-01-22 | End: 2019-01-22 | Stop reason: HOSPADM

## 2019-01-22 RX ORDER — PROCHLORPERAZINE MALEATE 10 MG
10 TABLET ORAL ONCE
Status: CANCELLED
Start: 2019-02-05 | End: 2019-02-05

## 2019-01-22 RX ORDER — ACETAMINOPHEN 325 MG/1
650 TABLET ORAL ONCE
Status: DISCONTINUED | OUTPATIENT
Start: 2019-01-22 | End: 2019-01-22 | Stop reason: HOSPADM

## 2019-01-22 RX ORDER — SODIUM CHLORIDE 9 MG/ML
250 INJECTION, SOLUTION INTRAVENOUS ONCE
Status: CANCELLED | OUTPATIENT
Start: 2019-01-29

## 2019-01-22 RX ORDER — SODIUM CHLORIDE 9 MG/ML
250 INJECTION, SOLUTION INTRAVENOUS ONCE
Status: CANCELLED | OUTPATIENT
Start: 2019-02-05

## 2019-01-22 RX ADMIN — INSULIN LISPRO 18 UNITS: 100 INJECTION, SOLUTION INTRAVENOUS; SUBCUTANEOUS at 12:22

## 2019-01-22 RX ADMIN — LEVETIRACETAM 500 MG: 500 TABLET, FILM COATED ORAL at 08:48

## 2019-01-22 RX ADMIN — DULOXETINE HYDROCHLORIDE 90 MG: 60 CAPSULE, DELAYED RELEASE ORAL at 08:48

## 2019-01-22 RX ADMIN — TRIMETHOBENZAMIDE HYDROCHLORIDE 300 MG: 300 CAPSULE ORAL at 08:48

## 2019-01-22 RX ADMIN — INSULIN LISPRO 2 UNITS: 100 INJECTION, SOLUTION INTRAVENOUS; SUBCUTANEOUS at 12:22

## 2019-01-22 RX ADMIN — PANTOPRAZOLE SODIUM 40 MG: 40 TABLET, DELAYED RELEASE ORAL at 06:28

## 2019-01-22 RX ADMIN — ACETAMINOPHEN 650 MG: 325 TABLET, FILM COATED ORAL at 14:13

## 2019-01-22 RX ADMIN — INSULIN LISPRO 18 UNITS: 100 INJECTION, SOLUTION INTRAVENOUS; SUBCUTANEOUS at 08:47

## 2019-01-22 RX ADMIN — SODIUM CHLORIDE, PRESERVATIVE FREE 3 ML: 5 INJECTION INTRAVENOUS at 08:48

## 2019-01-22 RX ADMIN — FOLIC ACID 1000 MCG: 1 TABLET ORAL at 08:48

## 2019-01-22 RX ADMIN — INSULIN LISPRO 1 UNITS: 100 INJECTION, SOLUTION INTRAVENOUS; SUBCUTANEOUS at 08:48

## 2019-01-22 RX ADMIN — ONDANSETRON 4 MG: 2 INJECTION INTRAMUSCULAR; INTRAVENOUS at 06:31

## 2019-01-22 RX ADMIN — ONDANSETRON 4 MG: 2 INJECTION INTRAMUSCULAR; INTRAVENOUS at 14:13

## 2019-01-22 RX ADMIN — INSULIN GLARGINE 38 UNITS: 100 INJECTION, SOLUTION SUBCUTANEOUS at 08:47

## 2019-01-22 RX ADMIN — RIFAXIMIN 550 MG: 550 TABLET ORAL at 08:48

## 2019-01-22 RX ADMIN — ACETAMINOPHEN 650 MG: 325 TABLET, FILM COATED ORAL at 06:31

## 2019-01-22 RX ADMIN — PREGABALIN 75 MG: 75 CAPSULE ORAL at 08:48

## 2019-01-22 NOTE — DISCHARGE SUMMARY
NAME: Silvia Zabala ADMIT: 2019   : 1960  PCP: Blanca Pitts MD    MRN: [id number] LOS: 5 days   AGE/SEX: 58 y.o. female  ROOM: 02/     Date of Admission:  2019  Date of Discharge:  2019    PCP: Blanca Pitts MD    CHIEF COMPLAINT  Hyperglycemia      DISCHARGE DIAGNOSIS  Active Hospital Problems    Diagnosis Date Noted   • **Gastroparesis [K31.84] 10/17/2017   • Hyperglycemia [R73.9] 2019   • Dehydration [E86.0] 2019   • Type 2 diabetes mellitus with hyperglycemia, with long-term current use of insulin (CMS/HCC) [E11.65, Z79.4] 2018   • DUKES (nonalcoholic steatohepatitis) [K75.81] 2016   • Nausea & vomiting [R11.2] 2016   • Hypersplenism [D73.1] 2016   • Pancytopenia (CMS/HCC) [D61.818] 2016      Resolved Hospital Problems   No resolved problems to display.       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   • Cirrhosis (CMS/HCC)    • Colon polyps    • Deafness    • Depression    • Diabetes mellitus (CMS/HCC)     Adult onset, insulin requiring   • Duodenal ulcer with hemorrhage    • Esophageal varices determined by endoscopy (CMS/HCC)    • Fibromyalgia    • Gallbladder disease    • Gastric varices    • Gastroparesis    • Glaucoma    • Granuloma annulare    • H/O B12 deficiency    • H/O Bleeding ulcer     And gastroesophageal varices   • H/O mixed connective tissue disorder    • Hemorrhoids    • Hiatal hernia    • History of transfusion    • Hx of being hospitalized     In [place] for GI bleeding from ulcer and varices   • Hyperlipidemia    • Migraine    • DUKES (nonalcoholic steatohepatitis) 2016   • BRICE (obstructive sleep apnea)    • Pancreas divisum    • Pancytopenia (CMS/HCC)     Chronic, due  to cirrhosis and hypersplenism   • Peptic ulcer disease     And esophageal varices   • PONV (postoperative nausea and vomiting)    • RA (rheumatoid arthritis) (CMS/HCC)    • Seizure disorder (CMS/HCC)     LAST ONE SEVERAL YEARS AGO   • Seizures (CMS/HCC)    • Systemic lupus (CMS/HCC)        CONSULTS   Dr. Burnett- Endocrinology  Dr. Coleman- GI  Dr. Rajput- Hematology    HOSPITAL COURSE  Patient is a 58 y.o. female with history of uncontrolled DM. She was admitted for severe hyperglycemia and flare of gastroparesis. Seen by Endocrinology and GI. Adjustment of medications  Blood sugars are looking much better now.      Follow up closely with Endocrinology. Also she will need close f/u with Dr Hope at  motility clinic, but unfortunately is not candidate for gastric stimulator due to her liver disease and hypersplenism.      She was seen by Hematology. Has chronic thrombocytopenia secondary to cirrhosis/hypersplenism with prior history of ITP. Chronic anemia but giving IV iron for iron deficiency anemia. She will follow with Hematology as an outpatient.     DIAGNOSTICS    01/22/2019 0427 01/22/2019 0528 Basic Metabolic Panel [[id number]]    (Abnormal)   Blood    Final result Glucose 233 Abnormally high  mg/dL   BUN 11 mg/dL   Creatinine 0.70 mg/dL   Sodium 139 mmol/L   Potassium 4.0 mmol/L   Chloride 105 mmol/L   CO2 22.6 mmol/L   Calcium 8.9 mg/dL   eGFR Non African Am 87 mL/min/1.73   BUN/Creatinine Ratio 15.7    Anion Gap 11.4 mmol/L          01/22/2019 0427 01/22/2019 0513 CBC Auto Differential [[id number]]   (Abnormal)   Blood    Final result WBC 1.72 Critically low  10*3/mm3   RBC 3.80 Abnormally low  10*6/mm3   Hemoglobin 10.3 Abnormally low  g/dL   Hematocrit 32.6 Abnormally low  %   MCV 85.8 fL   MCH 27.1 pg   MCHC 31.6 Abnormally low  g/dL   RDW 15.5 Abnormally high  %   RDW-SD 48.8 fl   MPV 11.0 fL   Platelets 53 Abnormally low  10*3/mm3   Neutrophil Rel % 52.9 %   Lymphocyte Rel % 30.2 %   Monocyte Rel %  12.2 Abnormally high  %        01/21/2019 2238 01/21/2019 2332 Ferritin [[id number]]   Blood    Final result Ferritin 29.80 ng/mL          01/21/2019 2238 01/21/2019 2313 Iron Profile [[id number]]   (Abnormal)   Blood    Final result Iron 28 Abnormally low  mcg/dL   Iron Saturation 6 Abnormally low  %   Transferrin 308 mg/dL   TIBC 459 mcg/dL          01/21/2019 2238 01/21/2019 2332 Vitamin B12 [[id number]]   (Abnormal)   Blood    Final result Vitamin B-12 1,221 Abnormally high  pg/mL          01/21/2019 2238 01/21/2019 2332 Folate [[id number]]   Blood    Final result Folate >20.00 ng/mL            01/17/2019 1259 01/17/2019 1807 Hemoglobin A1c [[id number]]    (Abnormal)   Blood    Final result Hemoglobin A1C 10.50 Abnormally high  %                PHYSICAL EXAM  Objective     Alert  Soft, nt  Chronically ill  No resp distress    CONDITION ON DISCHARGE  Stable.      DISCHARGE DISPOSITION   Home or Self Care      DISCHARGE MEDICATIONS       Your medication list      CHANGE how you take these medications      Instructions Last Dose Given Next Dose Due   Insulin Glargine 100 UNIT/ML injection pen  Commonly known as:  LANTUS SOLOSTAR  What changed:  how much to take      Inject 38 Units under the skin into the appropriate area as directed Every 12 (Twelve) Hours.       insulin lispro 100 UNIT/ML injection  Commonly known as:  humaLOG  What changed:    · how much to take  · how to take this  · when to take this  · additional instructions      18 units TID with meals, plus 1 unit per 40 greater than 150          CONTINUE taking these medications      Instructions Last Dose Given Next Dose Due   ALPRAZolam 0.5 MG tablet  Commonly known as:  XANAX      Take 1 tablet by mouth 2 (Two) Times a Day As Needed for Anxiety.       amitriptyline 50 MG tablet  Commonly known as:  ELAVIL      Take 1 tablet by mouth Every Night.       Cholecalciferol 1000 units tablet      Take 1,000 Units by mouth Daily.       cyclobenzaprine 5 MG  tablet  Commonly known as:  FLEXERIL      Take 5 mg by mouth every night at bedtime.       DULoxetine 30 MG capsule  Commonly known as:  CYMBALTA      Take 90 mg by mouth Daily.       folic acid 1 MG tablet  Commonly known as:  FOLVITE      Take 1 mg by mouth Daily.       FREESTYLE YESIKA SENSOR SYSTEM      1 Device Every 14 (Fourteen) Days.       furosemide 40 MG tablet  Commonly known as:  LASIX      Take 1 tablet by mouth Daily.       hydrOXYzine 25 MG tablet  Commonly known as:  ATARAX      Take 50 mg by mouth 3 (Three) Times a Day As Needed for Itching.       levETIRAcetam 500 MG tablet  Commonly known as:  KEPPRA      Take 500 mg by mouth 2 (Two) Times a Day.       multivitamin with minerals tablet tablet      Take 1 tablet by mouth Daily.       OXAYDO 7.5 MG tablet   Generic drug:  OxyCODONE HCl      Take 7.5 mg by mouth Every 8 (Eight) Hours As Needed.       pantoprazole 40 MG EC tablet  Commonly known as:  PROTONIX      Take 40 mg by mouth Daily.       pregabalin 75 MG capsule  Commonly known as:  LYRICA      Take 75 mg by mouth 2 (Two) Times a Day.       promethazine 25 MG tablet  Commonly known as:  PHENERGAN      Take 1 tablet by mouth Every 6 (Six) Hours As Needed for Nausea or Vomiting.       rifaximin 550 MG tablet  Commonly known as:  XIFAXAN      Take 550 mg by mouth 2 (Two) Times a Day.       spironolactone 100 MG tablet  Commonly known as:  ALDACTONE      Take 100 mg by mouth Daily.       trimethobenzamide 300 MG capsule  Commonly known as:  TIGAN      Take 300 mg by mouth 3 (Three) Times a Day.       vitamin B-12 1000 MCG tablet  Commonly known as:  CYANOCOBALAMIN      Take 1,000 mcg by mouth daily.          STOP taking these medications    HUMULIN R 500 UNIT/ML CONCENTRATED injection  Generic drug:  insulin regular              Where to Get Your Medications      These medications were sent to [facility] [address] [place] [address], [place] - [address] AT [facility]  [phone] [facility] [phone] FX  [address] , [facility] [address]    Phone:  [phone]   · promethazine 25 MG tablet     Information about where to get these medications is not yet available    Ask your nurse or doctor about these medications  · Insulin Glargine 100 UNIT/ML injection pen  · insulin lispro 100 UNIT/ML injection          Future Appointments   Date Time Provider Department Center   1/28/2019 11:40 AM [facility] None   1/28/2019  1:45 PM Rich Coleman MD [facility] None   2/14/2019 12:00 PM Merary Burnett MD [facility] None   5/3/2019  1:30 PM Blanca Pitts MD [facility] None     Additional Instructions for the Follow-ups that You Need to Schedule     Discharge Follow-up with PCP   As directed       Currently Documented PCP:    Blanca Pitts MD    PCP Phone Number:    [phone]     Follow Up Details:  2 weeks         Discharge Follow-up with Specialty: Endocrinology; 3 Weeks   As directed      Specialty:  Endocrinology    Follow Up:  3 Weeks         Discharge Follow-up with Specialty: [facility]; 3 Weeks   As directed      Specialty:  [facility]    Follow Up:  3 Weeks           Follow-up Information     Blanca Pitts MD .    Specialty:  Internal Medicine  Why:  2 weeks  Contact information:  [address] [place] [address]  [phone]                   TEST  RESULTS PENDING AT DISCHARGE         Adrián Zvaala MD  [facility]  01/22/19  2:19 PM      Time: greater than 32 minutes on discharge  It was a pleasure taking care of this patient while in the hospital.

## 2019-01-22 NOTE — TELEPHONE ENCOUNTER
----- Message from Kervin Rajput Jr., MD sent at 1/22/2019  2:52 PM EST -----  Regarding: hospital followup and IV Injectafer  Please schedule for:  -CBC and Injectafer 750mg IV in 1 and 2 weeks  -2 unit Dr Mcdowell visit in 4 weeks with cbc,iron panel, ferritin.

## 2019-01-22 NOTE — PROGRESS NOTES
Continued Stay Note  [facility]     Patient Name: Silvia Zabala  MRN: [id number]  Today's Date: 1/22/2019    Admit Date: 1/17/2019    Discharge Plan     Row Name 01/22/19 1522       Plan    Plan  Return home    Patient/Family in Agreement with Plan  yes    Plan Comments  Received call from Sujata/SANCHEZ arora, that cost for Lantus insulin is $86.66 and Humalog is $215.92. Spoke with patient at bedside.  She states that PCP told her if there was an issue with her insulin, the office would give her samples until she could get her insurance coverage straightened out.  Coupon given to patient for 90$ discount for Humalog and for Good RX discount.  Patient states she will go to PCP office when she leaves hospital.  BHumeniuk RN       Expected Discharge Date and Time     Expected Discharge Date Expected Discharge Time    Jan 22, 2019             Becky S. Humeniuk, RN

## 2019-01-22 NOTE — PROGRESS NOTES
58 y.o.   LOS: 5 days   Patient Care Team:  Blanca Pitts MD as PCP - General (Internal Medicine)  Sukhdeep Mcdowell MD as Consulting Physician (Hematology and Oncology)  Blanca Pitts MD as Referring Physician (Internal Medicine)  Rich Coleman MD as Consulting Physician (Gastroenterology)  Merary Burnett MD as Consulting Physician (Endocrinology)    Chief Complaint:  Elevated blood sugars    Chief Complaint   Patient presents with   • Hyperglycemia       Subjective   Patient's blood sugars have improved with the change in insulin regimen yesterday.  She still has some nausea but is able to tolerate oral.    Interval History:    Review of Systems:   Review of Systems   Constitutional: Positive for appetite change and fatigue. Negative for fever.   Eyes: Negative for visual disturbance.   Respiratory: Negative for shortness of breath.    Cardiovascular: Negative for palpitations and leg swelling.   Gastrointestinal: Positive for nausea. Negative for abdominal pain and vomiting.   Endocrine: Negative for polydipsia and polyuria.   Musculoskeletal: Positive for arthralgias. Negative for joint swelling and neck pain.   Skin: Negative for rash.   Neurological: Positive for weakness and numbness.   Psychiatric/Behavioral: Negative for behavioral problems.     Objective     Vital Signs   Temp:  [98 °F (36.7 °C)-98.3 °F (36.8 °C)] 98.2 °F (36.8 °C)  Heart Rate:  [] 83  Resp:  [16-18] 16  BP: (106-128)/(65-74) 112/65    Physical Exam:  Physical Exam   Constitutional: She is oriented to person, place, and time. She appears well-nourished.   Obese     HENT:   Head: Normocephalic and atraumatic.   Wide neck   Eyes: Conjunctivae and EOM are normal. No scleral icterus.   Neck: Normal range of motion. Neck supple. No thyromegaly present.   Acanthosis nigricans   Cardiovascular: Normal rate and normal heart sounds.   Pulmonary/Chest: Effort normal and breath sounds normal. No stridor. She has no wheezes.   Abdominal:  Soft. Bowel sounds are normal. She exhibits distension. There is no tenderness.   Central obesity   Musculoskeletal: She exhibits no edema or tenderness.   Neurological: She is alert and oriented to person, place, and time.   Skin: Skin is warm and dry. She is not diaphoretic.   Psychiatric: She has a normal mood and affect.   Vitals reviewed.  Results Review:     I reviewed the patient's new clinical results and summarized them in subjective and in plan.      Glucose   Date/Time Value Ref Range Status   01/22/2019 0427 233 (H) 65 - 99 mg/dL Final   01/21/2019 0553 185 (H) 65 - 99 mg/dL Final   01/20/2019 0545 267 (H) 65 - 99 mg/dL Final     Lab Results (last 24 hours)     Procedure Component Value Units Date/Time    POC Glucose Once [[id number]]  (Abnormal) Collected:  01/22/19 0758    Specimen:  Blood Updated:  01/22/19 0759     Glucose 210 mg/dL     Basic Metabolic Panel [[id number]]  (Abnormal) Collected:  01/22/19 0427    Specimen:  Blood Updated:  01/22/19 0528     Glucose 233 mg/dL      BUN 11 mg/dL      Creatinine 0.70 mg/dL      Sodium 139 mmol/L      Potassium 4.0 mmol/L      Chloride 105 mmol/L      CO2 22.6 mmol/L      Calcium 8.9 mg/dL      eGFR Non African Amer 87 mL/min/1.73      BUN/Creatinine Ratio 15.7     Anion Gap 11.4 mmol/L     Narrative:       GFR Normal >60  Chronic Kidney Disease <60  Kidney Failure <15    CBC & Differential [[id number]] Collected:  01/22/19 0427    Specimen:  Blood Updated:  01/22/19 0513    Narrative:       The following orders were created for panel order CBC & Differential.  Procedure                               Abnormality         Status                     ---------                               -----------         ------                     CBC Auto Differential[[id number]]        Abnormal            Final result                 Please view results for these tests on the individual orders.    CBC Auto Differential [[id number]]  (Abnormal) Collected:  01/22/19 0427     Specimen:  Blood Updated:  01/22/19 0513     WBC 1.72 10*3/mm3      RBC 3.80 10*6/mm3      Hemoglobin 10.3 g/dL      Hematocrit 32.6 %      MCV 85.8 fL      MCH 27.1 pg      MCHC 31.6 g/dL      RDW 15.5 %      RDW-SD 48.8 fl      MPV 11.0 fL      Platelets 53 10*3/mm3      Neutrophil % 52.9 %      Lymphocyte % 30.2 %      Monocyte % 12.2 %      Eosinophil % 4.1 %      Basophil % 0.6 %      Immature Grans % 0.0 %      Neutrophils, Absolute 0.91 10*3/mm3      Lymphocytes, Absolute 0.52 10*3/mm3      Monocytes, Absolute 0.21 10*3/mm3      Eosinophils, Absolute 0.07 10*3/mm3      Basophils, Absolute 0.01 10*3/mm3      Immature Grans, Absolute 0.00 10*3/mm3     Ferritin [[id number]]  (Normal) Collected:  01/21/19 2238    Specimen:  Blood Updated:  01/21/19 2332     Ferritin 29.80 ng/mL     Vitamin B12 [[id number]]  (Abnormal) Collected:  01/21/19 2238    Specimen:  Blood Updated:  01/21/19 2332     Vitamin B-12 1,221 pg/mL     Folate [[id number]]  (Normal) Collected:  01/21/19 2238    Specimen:  Blood Updated:  01/21/19 2332     Folate >20.00 ng/mL     Iron Profile [[id number]]  (Abnormal) Collected:  01/21/19 2238    Specimen:  Blood Updated:  01/21/19 2313     Iron 28 mcg/dL      Iron Saturation 6 %      Transferrin 308 mg/dL      TIBC 459 mcg/dL     Immature Platelet Fraction [[id number]]  (Abnormal) Collected:  01/21/19 2238    Specimen:  Blood Updated:  01/21/19 2254     IPF 7.20 %      Platelets 50 10*3/mm3     POC Glucose Once [[id number]]  (Abnormal) Collected:  01/21/19 2204    Specimen:  Blood Updated:  01/21/19 2216     Glucose 189 mg/dL     POC Glucose Once [[id number]]  (Abnormal) Collected:  01/21/19 2035    Specimen:  Blood Updated:  01/21/19 2047     Glucose 168 mg/dL     POC Glucose Once [[id number]]  (Abnormal) Collected:  01/21/19 1631    Specimen:  Blood Updated:  01/21/19 1633     Glucose 228 mg/dL     POC Glucose Once [[id number]]  (Abnormal) Collected:  01/21/19 1201    Specimen:  Blood Updated:  01/21/19  1202     Glucose 355 mg/dL         Imaging Results (last 24 hours)     ** No results found for the last 24 hours. **          Medication Review: done      Current Facility-Administered Medications:   •  acetaminophen (TYLENOL) tablet 650 mg, 650 mg, Oral, Q4H PRN, Wesly Walsh MD, 650 mg at 01/22/19 0631  •  ALPRAZolam (XANAX) tablet 0.5 mg, 0.5 mg, Oral, BID PRN, Wesly Walsh MD, 0.5 mg at 01/21/19 2033  •  amitriptyline (ELAVIL) tablet 50 mg, 50 mg, Oral, Nightly, Wesly Walsh MD, 50 mg at 01/21/19 2029  •  cyclobenzaprine (FLEXERIL) tablet 5 mg, 5 mg, Oral, Nightly PRN, Wesly Walsh MD, 5 mg at 01/21/19 2216  •  dextrose (D50W) 25 g/ 50mL Intravenous Solution 25 g, 25 g, Intravenous, Q15 Min PRN, Wesly Walsh MD  •  dextrose (GLUTOSE) oral gel 15 g, 15 g, Oral, Q15 Min PRN, Wesly Walsh MD  •  DULoxetine (CYMBALTA) DR capsule 90 mg, 90 mg, Oral, Daily, Wesly Walsh MD, 90 mg at 01/22/19 0848  •  folic acid (FOLVITE) tablet 1,000 mcg, 1,000 mcg, Oral, Daily, Wesly Walsh MD, 1,000 mcg at 01/22/19 0848  •  glucagon (human recombinant) (GLUCAGEN DIAGNOSTIC) injection 1 mg, 1 mg, Subcutaneous, PRN, Wesly Walsh MD  •  insulin glargine (LANTUS) injection 40 Units, 40 Units, Subcutaneous, Q12H, Merary Burnett MD  •  insulin lispro (humaLOG) injection 0-12 Units, 0-12 Units, Subcutaneous, 4x Daily With Meals & Nightly, Dionisio Altman MD, 1 Units at 01/22/19 0848  •  insulin lispro (humaLOG) injection 18 Units, 18 Units, Subcutaneous, TID With Meals, Merary Burnett MD, 18 Units at 01/22/19 0847  •  levETIRAcetam (KEPPRA) tablet 500 mg, 500 mg, Oral, BID, Wesly Walsh MD, 500 mg at 01/22/19 0848  •  ondansetron (ZOFRAN) injection 4 mg, 4 mg, Intravenous, Q6H PRN, Wesly Walsh MD, 4 mg at 01/22/19 0631  •  pantoprazole (PROTONIX) EC tablet 40 mg, 40 mg, Oral,  Daily, Wesly Walsh MD, 40 mg at 01/22/19 0628  •  pregabalin (LYRICA) capsule 75 mg, 75 mg, Oral, Q12H, Wesly Walsh MD, 75 mg at 01/22/19 0848  •  rifaximin (XIFAXAN) tablet 550 mg, 550 mg, Oral, BID, Wesly Walsh MD, 550 mg at 01/22/19 0848  •  [COMPLETED] Insert peripheral IV, , , Once **AND** sodium chloride 0.9 % flush 10 mL, 10 mL, Intravenous, PRN, Daryn Rosa MD  •  sodium chloride 0.9 % flush 3 mL, 3 mL, Intravenous, Q12H, Wesly Walsh MD, 3 mL at 01/22/19 0848  •  sodium chloride 0.9 % flush 3-10 mL, 3-10 mL, Intravenous, PRN, Wesly Walsh MD  •  trimethobenzamide (TIGAN) capsule 300 mg, 300 mg, Oral, TID, Wesly Walsh MD, 300 mg at 01/22/19 0848    Assessment/Plan     Active Hospital Problems    Diagnosis Date Noted   • **Hyperglycemia [R73.9] 01/17/2019   • Dehydration [E86.0] 01/17/2019   • Type 2 diabetes mellitus with hyperglycemia, with long-term current use of insulin (CMS/[facility]) [E11.65, Z79.4] 12/13/2018   • Gastroparesis [K31.84] 10/17/2017   • DUKES (nonalcoholic steatohepatitis) [K75.81] 06/14/2016   • Nausea & vomiting [R11.2] 06/14/2016   • Hypersplenism [D73.1] 04/12/2016   • Pancytopenia (CMS/HCC) [D61.818] 04/12/2016      Resolved Hospital Problems   No resolved problems to display.     Type 2 diabetes mellitus-uncontrolled, complicated with gastroparesis  Increase Lantus to 40 units twice daily  Continue Humalog 18 units with each meal.  Also increased the Humalog sliding scale to 3 times a day before meals and at bedtime-moderate dose.    Currently trying to convince the insurance to get a continuous glucose monitoring approved for the patient to be more aggressive with her insulin regimen and also a safety issue to prevent hypoglycemic episodes.     Gastroparesis  GI on board.  Patient's glycemic control is extremely important in order to prevent her gastroparesis flares up.  Not a candidate for  "gastric pacemaker due to her ITP.    Discussed plan with Nurse.     Merary Burnett MD.  01/22/19  10:32 AM      EM[facility] Dragon / transcription disclaimer:    \"Dictated utilizing Dragon dictation\".   "

## 2019-01-22 NOTE — CONSULTS
Subjective .     REASONS FOR CURRENT CONSULTATION: Pancytopenia     REASONS FOR HEMATOLOGY/ONCOLOGY CARE:  1. 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   2. Evidence of B12 deficiency on her initial lab evaluation in April 2013.  Patient was started on parenteral B12 replacement.  The patient was switched from shots to daily oral B12 replacement after the visit of 12/02/2013.    3. Hemoglobin has significantly improved since banding of her esophageal varices and increase in her oral iron supplement to twice daily dosing.  4. Morphologically normal bone marrow from 08/20/2013 with normal flow cytometry.  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  Significance of this is uncertain.    5. Mixed connective tissue disorder, previously on Enbrel therapy.  6. Insulin-requiring diabetes mellitus.  7. Patient continues to follow-up periodically with the transplant team at [facility] but currently she is not on the transplant list.  8. 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 Rituxan therapy weekly ×4 completed 7/16/2018    HISTORY OF PRESENT ILLNESS:  The patient is a 58 y.o. year old female who we are asked to see following hospital admission with the above-mentioned history.    History of Present Illness   patient is followed in our practice by Dr. Mcdowell.  As above, prior history of B12 deficiency and on bone marrow biopsy 8/20/13 had a normal marrow except for cytogenetics with a normal abnormality with additional material on chromosome 16 of unclear significance.  She has underlying cirrhosis secondary to DUKES with associated hypersplenism and resulting leukopenia/thrombocytopenia.  Her thrombocytopenia became severe with diagnosis of acute ITP in May 2018.  She was successfully treated with IVIG and steroids with subsequent Rituxan therapy completed  7/16/18.  Steroids were tapered down to 10 mg of prednisone.  She followed up with her rheumatologist Dr. Munoz.  She has remained off of treatment for mixed connective tissue disorder since May 2018, previously on enbrel.  She was seen in consultation during hospitalization in October 2018 with fairly stable WBC in the 1-2000 range and platelet count in the 70-low 100,000 range.  She did have a persistent anemia with hemoglobin in the 9-10 range, MCV normal.    At some point in October/November 2018, the patient was in [place] and reports being admitted with pneumonia and subsequently tapered herself off of prednisone from 10 mg daily.  She experienced a fall in November/December 2018 and had evidence of a left humeral head fracture on plain films 12/13/18, initial concern regarding pathologic fracture.  Upon further review by orthopedics, this was not felt to represent a pathologic fracture but was felt to be related to healing of the fracture which was already a number of weeks old.  She has poorly controlled diabetes which was an issue during hospitalization in December 2018.  Labs during that admission with WBC in the 2-3000 range, platelet count in the 90-low 100,000 range, hemoglobin 9-10.    Patient was subsequently readmitted on 1/17/19 with hyperglycemia associated with nausea and vomiting, generalized abdominal pain.  The patient has long-standing gastroparesis and her GI symptoms are felt to be related to this.  Unfortunately, she had previous side effects neurologically from Reglan.  Symptoms have waxed and waned with nausea and bloating.  On admission 1/17/19, WBC was 2.9 with differential 68 segs, 18 lymphs, 10 monocytes, ANC 1.97 with hemoglobin 11.8, MCV 83.2, platelet count 107,000.  Since that time, WBC has been in the 1-2000 range with stable differential.  Hemoglobin has declined into the 10-11 range, platelet count has declined into the 50-46606 range.  She has had no obvious evidence of  bleeding.      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   • Cirrhosis (CMS/HCC)    • Colon polyps    • Deafness    • Depression    • Diabetes mellitus (CMS/HCC)     Adult onset, insulin requiring   • Duodenal ulcer with hemorrhage    • Esophageal varices determined by endoscopy (CMS/HCC)    • Fibromyalgia    • Gallbladder disease    • Gastric varices    • Gastroparesis    • Glaucoma    • Granuloma annulare    • H/O B12 deficiency    • H/O Bleeding ulcer     And gastroesophageal varices   • H/O mixed connective tissue disorder    • Hemorrhoids    • Hiatal hernia    • History of transfusion    • Hx of being hospitalized     In [place] for GI bleeding from ulcer and varices   • Hyperlipidemia    • Migraine    • DUKES (nonalcoholic steatohepatitis) 11/2016   • BRICE (obstructive sleep apnea)    • Pancreas divisum    • Pancytopenia (CMS/HCC)     Chronic, due to cirrhosis and hypersplenism   • Peptic ulcer disease     And esophageal varices   • PONV (postoperative nausea and vomiting)    • RA (rheumatoid arthritis) (CMS/HCC)    • Seizure disorder (CMS/HCC)     LAST ONE SEVERAL YEARS AGO   • Seizures (CMS/HCC)    • Systemic lupus (CMS/HCC)      Past Surgical History:   Procedure Laterality Date   • BLADDER REPAIR  1991   • CATARACT EXTRACTION     • CHOLECYSTECTOMY  1994   • ENDOSCOPY N/A 11/7/2016    Procedure: ESOPHAGOGASTRODUODENOSCOPY WITH COLD POLYPECTOMY, BANDING,  AND CLIPS TIMES 2;  Surgeon: Rich Coleman MD;  Location: [facility] ENDOSCOPY;  Service:    • ENDOSCOPY N/A 12/22/2016    Procedure: ESOPHAGOGASTRODUODENOSCOPY WITH ESOPHAGEAL BANDING. 5 BANDS FIRED, 4 BANDS ADHERERD TO MUCOSA;  Surgeon: Rich Coleman MD;  Location: [facility] ENDOSCOPY;  Service:    • ENDOSCOPY N/A 2/15/2017     "Procedure: ESOPHAGOGASTRODUODENOSCOPY AT BEDSIDE with esophageal varices banding (3);  Surgeon: Rich Coleman MD;  Location: [facility] ENDOSCOPY;  Service:    • ENDOSCOPY N/A 4/6/2017    Procedure: ESOPHAGOGASTRODUODENOSCOPY WITH ESOPHAGEAL VARICES BANDING x2;  Surgeon: Rich Coleman MD;  Location: [facility] ENDOSCOPY;  Service:    • ENDOSCOPY N/A 11/24/2017    Procedure: ESOPHAGOGASTRODUODENOSCOPY with biopsies;  Surgeon: Rich Coleman MD;  Location: [facility] ENDOSCOPY;  Service:    • ENDOSCOPY N/A 10/5/2018    Procedure: ESOPHAGOGASTRODUODENOSCOPY;  Surgeon: Rich Coleman MD;  Location: [facility] ENDOSCOPY;  Service: Gastroenterology   • ENDOSCOPY AND COLONOSCOPY  2014    Dr. Rich Coleman with findings of candida esophagitis   • EYE SURGERY     • HYSTERECTOMY  1986    partial   • JOINT REPLACEMENT     • KNEE SURGERY Right 1995    \"right knee recontstruction\"   • LIVER BIOPSY  01/2015   • MASS EXCISION     • STOMACH SURGERY           No current facility-administered medications on file prior to encounter.      Current Outpatient Medications on File Prior to Encounter   Medication Sig Dispense Refill   • ALPRAZolam (XANAX) 0.5 MG tablet Take 1 tablet by mouth 2 (Two) Times a Day As Needed for Anxiety. 60 tablet 1   • amitriptyline (ELAVIL) 50 MG tablet Take 1 tablet by mouth Every Night. 30 tablet 0   • cholecalciferol 1000 units tablet Take 1,000 Units by mouth Daily.     • cyclobenzaprine (FLEXERIL) 5 MG tablet Take 5 mg by mouth every night at bedtime.     • DULoxetine (CYMBALTA) 30 MG capsule Take 90 mg by mouth Daily.     • folic acid (FOLVITE) 1 MG tablet Take 1 mg by mouth Daily.     • furosemide (LASIX) 40 MG tablet Take 1 tablet by mouth Daily. 30 tablet 2   • hydrOXYzine (ATARAX) 25 MG tablet Take 50 mg by mouth 3 (Three) Times a Day As Needed for Itching.     • insulin regular (HUMULIN R) 500 UNIT/ML CONCENTRATED injection Inject 65 Units under the skin into the appropriate area as directed 3 (Three) " "Times a Day Before Meals.     • insulin regular (HUMULIN R) 500 UNIT/ML CONCENTRATED injection Inject 30-35 Units under the skin into the appropriate area as directed See Admin Instructions. Takes with snacks PRN     • levETIRAcetam (KEPPRA) 500 MG tablet Take 500 mg by mouth 2 (Two) Times a Day.     • Multiple Vitamins-Minerals (MULTIVITAMIN WITH MINERALS) tablet tablet Take 1 tablet by mouth Daily.     • OxyCODONE HCl (OXAYDO) 7.5 MG tablet  Take 7.5 mg by mouth Every 8 (Eight) Hours As Needed.     • pantoprazole (PROTONIX) 40 MG EC tablet Take 40 mg by mouth Daily.     • pregabalin (LYRICA) 75 MG capsule Take 75 mg by mouth 2 (Two) Times a Day.     • promethazine (PHENERGAN) 25 MG tablet Take 25 mg by mouth Every 6 (Six) Hours As Needed for Nausea or Vomiting.     • rifaximin (XIFAXAN) 550 MG tablet Take 550 mg by mouth 2 (Two) Times a Day.     • spironolactone (ALDACTONE) 100 MG tablet Take 100 mg by mouth Daily.     • trimethobenzamide (TIGAN) 300 MG capsule Take 300 mg by mouth 3 (Three) Times a Day.     • vitamin B-12 (CYANOCOBALAMIN) 1000 MCG tablet Take 1,000 mcg by mouth daily.     • Continuous Blood Gluc Sensor (FREESTmyOrder YESIKA SENSOR SYSTEM) 1 Device Every 14 (Fourteen) Days. 2 each 3   • Insulin Glargine (LANTUS SOLOSTAR) 100 UNIT/ML injection pen Inject 32 Units under the skin into the appropriate area as directed Every 12 (Twelve) Hours. 10 pen 11   • insulin lispro (humaLOG) 100 UNIT/ML injection Inject 0-24 Units under the skin into the appropriate area as directed 4 (Four) Times a Day With Meals & at Bedtime for 30 days. 30 mL 11       ALLERGIES:     Allergies   Allergen Reactions   • Albuterol Anaphylaxis   • Tramadol Nausea Only, Other (See Comments) and GI Intolerance     Per pt causes her \"palsy, meaning shaking and tremors\"    • Lactulose Nausea And Vomiting and Other (See Comments)     Severe abdominal pain   • Quinine Derivatives Nausea And Vomiting       Social History     Socioeconomic " History   • Marital status:      Spouse name: Jose   • Number of children: Not on file   • Years of education: Not on file   • Highest education level: Not on file   Social Needs   • Financial resource strain: Not on file   • Food insecurity - worry: Not on file   • Food insecurity - inability: Not on file   • Transportation needs - medical: Not on file   • Transportation needs - non-medical: Not on file   Occupational History     Employer: DISABLED   Tobacco Use   • Smoking status: Former Smoker     Packs/day: 0.00     Years: 0.00     Pack years: 0.00     Last attempt to quit: 12/17/2015     Years since quitting: 3.0   • Smokeless tobacco: Never Used   Substance and Sexual Activity   • Alcohol use: No   • Drug use: No   • Sexual activity: Defer   Other Topics Concern   • Not on file   Social History Narrative    Moved to [place] from [place]. She is currently medically disabled.     Family History   Problem Relation Age of Onset   • Liver disease Other    • Depression Other    • Heart disease Other    • Hypertension Other    • Diabetes Other    • Breast cancer Other    • Brain cancer Other    • Uterine cancer Other    • Colon cancer Other    • Leukemia Other    • Colon cancer Maternal Aunt    • Hypertension Mother    • Diabetes type II Mother    • Rheum arthritis Mother    • Liver disease Mother         DUKES   • Heart disease Father    • Diabetes type II Father    • Diabetes type II Sister    • Lupus Sister    • Malig Hyperthermia Neg Hx            A full review of systems was obtained with pertinent positive findings as noted in the history of present illness above.  All other systems negative.      Objective      Vitals:    01/21/19 2015   BP: 128/70   Pulse: 104   Resp: 16   Temp: 98 °F (36.7 °C)   SpO2:       Current Status 9/10/2018   ECOG score 0       Physical Exam   Constitutional: She is oriented to person, place, and time.   Chronically ill-appearing female in no distress lying in bed   HENT:    Mouth/Throat: Oropharynx is clear and moist.   Eyes: Conjunctivae are normal.   Neck: No thyromegaly present.   Cardiovascular: Normal rate and regular rhythm. Exam reveals no gallop and no friction rub.   No murmur heard.  Pulmonary/Chest: Breath sounds normal. No respiratory distress.   Abdominal: Soft. Bowel sounds are normal. She exhibits no distension.   Mild generalized abdominal discomfort   Musculoskeletal: She exhibits edema.   Trace bilateral lower extremity edema   Lymphadenopathy:        Head (right side): No submandibular adenopathy present.     She has no cervical adenopathy.     She has no axillary adenopathy.        Right: No inguinal and no supraclavicular adenopathy present.        Left: No inguinal and no supraclavicular adenopathy present.   Neurological: She is alert and oriented to person, place, and time. She displays normal reflexes. No cranial nerve deficit. She exhibits normal muscle tone.   Skin: Skin is warm and dry. No rash noted.   Psychiatric: She has a normal mood and affect. Her behavior is normal.       RECENT LABS:  Hematology WBC   Date Value Ref Range Status   01/21/2019 1.94 (L) 4.50 - 10.70 10*3/mm3 Final     RBC   Date Value Ref Range Status   01/21/2019 3.82 (L) 3.90 - 5.20 10*6/mm3 Final     Hemoglobin   Date Value Ref Range Status   01/21/2019 10.3 (L) 11.9 - 15.5 g/dL Final     Hematocrit   Date Value Ref Range Status   01/21/2019 32.6 (L) 35.6 - 45.5 % Final     Platelets   Date Value Ref Range Status   01/21/2019 53 (L) 140 - 500 10*3/mm3 Final        Assessment/Plan   1. DUKES/cirrhosis with associated hypersplenism:  · Most recent imaging of abdomen 10/3/18 with CT scan showing 17.6 cm spleen as well as cirrhotic liver and portal hypertension.  Ill-defined abnormality in right hepatic lobe with subsequent MRI showing no suspicious abnormalities.  2. Chronic thrombocytopenia secondary to cirrhosis/hypersplenism with prior history of ITP:  · Baseline thrombocytopenia  secondary to cirrhosis/hypersplenism, platelet count in 70-low 100,000 range  · Subsequent acute ITP in May 2018 with platelet count down to 3000.  Treated with IVIG and steroids with subsequent rituximab weekly ×4 completed 7/16/18.  · Patient has maintained remission from ITP  · Platelet count on admission at baseline however has declined into the 50-71128 range currently.  Patient is experiencing no bleeding issues.  We will check IPF, B12, folate.  Likely, platelet count will improve back to prior baseline.  3. Chronic leukopenia secondary to cirrhosis/hypersplenism:  · Chronic WBC and 1-2000 range, currently 1.94 with normal differential.  4. Chronic normocytic anemia:  · Patient with hemoglobin in the 9-10 range, currently 10.3 with MCV 85.3.  · Per records, patient with prior history of iron deficiency as well as B12 deficiency  · No recent clinical evidence of GI blood loss.  · We will send off an anemia evaluation today.  Hemoglobin however has remained fairly stable recently.  5. Mixed connective tissue disorder:  · Patient previously treated with enbrel.  She reports that this was held beginning in May 2018 at the time of diagnosis of ITP.  She has remained off of specific treatment for her rheumatologic condition since then.  · The patient had been maintained on low-dose prednisone after treatment for ITP with prednisone 10 mg daily.  Patient tapered herself off prednisone in November 2018 while in [place].  6. Diabetes mellitus with gastroparesis:    Plan:  1. Additional labs today with IPF, iron panel, ferritin, B12, folate  2. Daily CBC/diff

## 2019-01-22 NOTE — PROGRESS NOTES
[facility]  Inpatient Progress Note    Reason for Follow Up:  Nausea/vomiting, gastroparesis    Subjective     Interval History:   Symptoms improved today.  Has tolerated breakfast and lunch.      Current Facility-Administered Medications:   •  acetaminophen (TYLENOL) tablet 650 mg, 650 mg, Oral, Q4H PRN, Wesly Walsh MD, 650 mg at 01/22/19 0631  •  ALPRAZolam (XANAX) tablet 0.5 mg, 0.5 mg, Oral, BID PRN, Wesly Walsh MD, 0.5 mg at 01/21/19 2033  •  amitriptyline (ELAVIL) tablet 50 mg, 50 mg, Oral, Nightly, Wesly Walsh MD, 50 mg at 01/21/19 2029  •  cyclobenzaprine (FLEXERIL) tablet 5 mg, 5 mg, Oral, Nightly PRN, Wesly Walsh MD, 5 mg at 01/21/19 2216  •  dextrose (D50W) 25 g/ 50mL Intravenous Solution 25 g, 25 g, Intravenous, Q15 Min PRN, Wesly Walsh MD  •  dextrose (GLUTOSE) oral gel 15 g, 15 g, Oral, Q15 Min PRN, Wesly Walsh MD  •  DULoxetine (CYMBALTA) DR capsule 90 mg, 90 mg, Oral, Daily, Wesly Walsh MD, 90 mg at 01/22/19 0848  •  folic acid (FOLVITE) tablet 1,000 mcg, 1,000 mcg, Oral, Daily, Wesly Walsh MD, 1,000 mcg at 01/22/19 0848  •  glucagon (human recombinant) (GLUCAGEN DIAGNOSTIC) injection 1 mg, 1 mg, Subcutaneous, PRN, Wesly Walsh MD  •  insulin glargine (LANTUS) injection 40 Units, 40 Units, Subcutaneous, Q12H, Merary Burnett MD  •  insulin lispro (humaLOG) injection 0-12 Units, 0-12 Units, Subcutaneous, 4x Daily With Meals & Nightly, Dionisio Altman MD, 1 Units at 01/22/19 0848  •  insulin lispro (humaLOG) injection 18 Units, 18 Units, Subcutaneous, TID With Meals, Merary Burnett MD, 18 Units at 01/22/19 0847  •  levETIRAcetam (KEPPRA) tablet 500 mg, 500 mg, Oral, BID, Wesly Walsh MD, 500 mg at 01/22/19 0848  •  ondansetron (ZOFRAN) injection 4 mg, 4 mg, Intravenous, Q6H PRN, Wesly Walsh MD, 4 mg at 01/22/19  0631  •  pantoprazole (PROTONIX) EC tablet 40 mg, 40 mg, Oral, Daily, Wesly Walsh MD, 40 mg at 01/22/19 0628  •  pregabalin (LYRICA) capsule 75 mg, 75 mg, Oral, Q12H, Wesly Walsh MD, 75 mg at 01/22/19 0848  •  rifaximin (XIFAXAN) tablet 550 mg, 550 mg, Oral, BID, Wesly Walsh MD, 550 mg at 01/22/19 0848  •  [COMPLETED] Insert peripheral IV, , , Once **AND** sodium chloride 0.9 % flush 10 mL, 10 mL, Intravenous, PRN, Daryn Rosa MD  •  sodium chloride 0.9 % flush 3 mL, 3 mL, Intravenous, Q12H, Wesly Walsh MD, 3 mL at 01/22/19 0848  •  sodium chloride 0.9 % flush 3-10 mL, 3-10 mL, Intravenous, PRN, Wesly Walsh MD  •  trimethobenzamide (TIGAN) capsule 300 mg, 300 mg, Oral, TID, Wesly Walsh MD, 300 mg at 01/22/19 0848  Review of Systems:    The following systems were reviewed and negative;  constitution    Objective     Vital Signs  Temp:  [98 °F (36.7 °C)-98.3 °F (36.8 °C)] 98.2 °F (36.8 °C)  Heart Rate:  [] 83  Resp:  [16-18] 16  BP: (106-128)/(65-74) 112/65  Body mass index is 28.94 kg/m².    Intake/Output Summary (Last 24 hours) at 1/22/2019 1137  Last data filed at 1/22/2019 0901  Gross per 24 hour   Intake --   Output 200 ml   Net -200 ml     I/O this shift:  In: -   Out: 200 [Urine:200]     Physical Exam:   General: patient awake, alert and cooperative   Cardiovascular: regular rhythm and rate, no murmurs auscultated   Pulm: clear to auscultation bilaterally, regular and unlabored   Abdomen: soft, nontender, nondistended; normal bowel sounds   Rectal: deferred   Extremities: no rash or edema   Psychiatric: Normal mood and behavior; memory intact     Results Review:     I reviewed the patient's new clinical results.    Results from last 7 days   Lab Units 01/22/19  0427 01/21/19  2238 01/21/19  0554 01/20/19  0545   WBC 10*3/mm3 1.72*  --  1.94* 1.78*   HEMOGLOBIN g/dL 10.3*  --  10.3* 10.1*   HEMATOCRIT % 32.6*   --  32.6* 31.8*   PLATELETS 10*3/mm3 53* 50* 53* 54*     Results from last 7 days   Lab Units 01/22/19  0427 01/21/19  0553 01/20/19  0545  01/17/19  1259   SODIUM mmol/L 139 141 139   < > 129*   POTASSIUM mmol/L 4.0 3.8 3.8   < > 4.1   CHLORIDE mmol/L 105 106 105   < > 88*   CO2 mmol/L 22.6 25.4 21.5*   < > 22.3   BUN mg/dL 11 10 8   < > 17   CREATININE mg/dL 0.70 0.77 0.73   < > 1.13*   CALCIUM mg/dL 8.9 9.2 9.1   < > 9.6   BILIRUBIN mg/dL  --   --   --   --  1.7*   ALK PHOS U/L  --   --   --   --  237*   ALT (SGPT) U/L  --   --   --   --  34*   AST (SGOT) U/L  --   --   --   --  28   GLUCOSE mg/dL 233* 185* 267*   < > 633*    < > = values in this interval not displayed.     Results from last 7 days   Lab Units 01/17/19  1259   INR  1.27*     Lab Results   Lab Value Date/Time    LIPASE 9 (L) 01/17/2019 1259    LIPASE 8 (L) 12/13/2018 1900    LIPASE 12 (L) 10/02/2018 2332    LIPASE 9 (L) 05/18/2018 0425    LIPASE 22 12/16/2017 1650    LIPASE 10 (L) 10/30/2017 1732    LIPASE 9 (L) 02/21/2017 1202    LIPASE 16 11/10/2014 0724         Assessment/Plan   Assessment:   1.  Gastroparesis -   2.  DUKES  3.  DM  4.  Pancytopenia    Plan:   Continue IV antiemtics  She will need close f/u with Dr Hope at [facility], but unfortunately is not candidate for gastric stimulator due to her liver disease and hypersplenism.      I discussed the patients findings and my recommendations with patient.         Los Gao M.D.  [facility]  [address] [place] [address]  Office: [phone]

## 2019-01-22 NOTE — PLAN OF CARE
Problem: Patient Care Overview  Goal: Plan of Care Review   01/22/19 1537   OTHER   Outcome Summary patient is stable for DC. Blood glucose stable. Home with . follow up with PCP in 2 weeks appointment made.

## 2019-01-22 NOTE — PROGRESS NOTES
Plan placed for Injectafer 750mg IV in 1 and 2 weeks with cbc  Ferritin and Iron panel in 4 weeks with MD visit

## 2019-01-22 NOTE — TELEPHONE ENCOUNTER
----- Message from Kervin Rajput Jr., MD sent at 1/22/2019  3:41 PM EST -----  Regarding: RE: hospital followup and IV Injectafer  That should be fine  ----- Message -----  From: Luh Grant  Sent: 1/22/2019   3:07 PM  To: Kervin Rajput Jr., MD, Sukhdeep Mcdowell MD  Subject: FW: hospital followup and IV Injectafer          Dr Mcdowell is on vacation in 4 weeks so I went to 5 week w/ DR Mcdowell can either of you please let me know if that will be ok or not? Thank you!  ----- Message -----  From: Kervin Rajput Jr., MD  Sent: 1/22/2019   2:52 PM  To: Helga Alexander, SRIKNATH, Lisa Wing, RN, #  Subject: hospital followup and IV Injectafer              Please schedule for:  -CBC and Injectafer 750mg IV in 1 and 2 weeks  -2 unit Dr Mcdowell visit in 4 weeks with cbc,iron panel, ferritin.

## 2019-01-22 NOTE — PROGRESS NOTES
Continued Stay Note  [facility]     Patient Name: Silvia Zabala  MRN: [id number]  Today's Date: 1/22/2019    Admit Date: 1/17/2019    Discharge Plan     Row Name 01/22/19 0938       Plan    Plan  Return home    Patient/Family in Agreement with Plan  yes    Plan Comments  Spoke with patient at bedside.  Patient states her mother-in-law also lives with patient and her , and that she can assist some if needed.  She does not feel that HH would be beneficial.  Plan remains for her to return home at [place].  CCP will continue to follow.  BHumeniuk RN       Expected Discharge Date and Time     Expected Discharge Date Expected Discharge Time    Jan 21, 2019             Becky S. Humeniuk, RN

## 2019-01-22 NOTE — PLAN OF CARE
Problem: Patient Care Overview  Goal: Plan of Care Review  Outcome: Ongoing (interventions implemented as appropriate)   01/22/19 0333   Coping/Psychosocial   Plan of Care Reviewed With patient   Plan of Care Review   Progress no change   OTHER   Outcome Summary cont to c.o nausea but no need for prn medication tonight. Did have a BM which states gave some relief. c.o bad taste in mouth which was improved after eating a sugar free popsicle. BG cont to be elevated but better. up to bathroom w sba. VSS. will cont to monitor      Goal: Individualization and Mutuality  Outcome: Ongoing (interventions implemented as appropriate)    Goal: Discharge Needs Assessment  Outcome: Ongoing (interventions implemented as appropriate)    Goal: Interprofessional Rounds/Family Conf  Outcome: Ongoing (interventions implemented as appropriate)      Problem: Hyperglycemia, Persistent (Adult)  Goal: Signs and Symptoms of Listed Potential Problems Will be Absent, Minimized or Managed (Hyperglycemia, Persistent)  Outcome: Ongoing (interventions implemented as appropriate)      Problem: Fall Risk (Adult)  Goal: Absence of Fall  Outcome: Ongoing (interventions implemented as appropriate)

## 2019-01-23 ENCOUNTER — READMISSION MANAGEMENT (OUTPATIENT)
Dept: CALL CENTER | Facility: HOSPITAL | Age: 59
End: 2019-01-23

## 2019-01-23 ENCOUNTER — TELEPHONE (OUTPATIENT)
Dept: ONCOLOGY | Facility: HOSPITAL | Age: 59
End: 2019-01-23

## 2019-01-23 RX ORDER — FLASH GLUCOSE SENSOR
1 KIT MISCELLANEOUS
Qty: 2 EACH | Refills: 3 | Status: SHIPPED | OUTPATIENT
Start: 2019-01-23 | End: 2021-01-29 | Stop reason: SDUPTHER

## 2019-01-23 NOTE — PROGRESS NOTES
Case Management Discharge Note    Final Note: Patient has been DC'd home         Other: Other(Private car)    Final Discharge Disposition Code: 01 - home or self-care

## 2019-01-23 NOTE — OUTREACH NOTE
Prep Survey      Responses   Facility patient discharged from?  [place]   Is patient eligible?  Yes   Discharge diagnosis  Gastroparesis   Does the patient have one of the following disease processes/diagnoses(primary or secondary)?  Other   Does the patient have Home health ordered?  No   Is there a DME ordered?  No   Prep survey completed?  Yes          Sandra Dempsey RN

## 2019-01-23 NOTE — TELEPHONE ENCOUNTER
----- Message from Kervin Rajput Jr., MD sent at 1/23/2019  1:51 PM EST -----  Regarding: RE:  No new orders- monitor  ----- Message -----  From: Emily Bonilla RN  Sent: 1/23/2019   1:47 PM  To: MD Dr. Regulo Valdez Jr.,  Pt wanted me to let you know that she has been having bleeding from the roof of her mouth over the last few days. You saw her in the hospital yesterday and she was d/c. She will f/u with GI on 1/28and will get injectafer at our office next week. She states it is not that much bleeding but she was just wanting to make you aware. Please let me know if you have nay new orders. Thank you

## 2019-01-23 NOTE — TELEPHONE ENCOUNTER
----- Message from Ammon Mallory sent at 1/23/2019 12:25 PM EST -----  Contact: [phone]  D/C from hospital yesterday. Dr Rajput said he need to find were she is bleeding from. Yesterday and today she is bleeding from the roof of her mouth.      Pt called because Dr. Rajput had discharged her from the hospital yesterday and she noticed over the last few days that she has been bleeding from the roof of her mouth and wanted to let Dr. Rajput know. She has hx of ITP and iron deficiency anemia. She will be following up with GI on Monday and will get Injectafer next week. She states it is not much blood but she wanted Dr. Rajput to know. Dr. Rajupt rounding in hospital. D/W Sujata Glover NP. Per Sujata, no new orders, just make Dr. Rajput aware. Message sent to Dr. Rajput. Advised pt we would call with any new orders. She v/u.

## 2019-01-24 ENCOUNTER — READMISSION MANAGEMENT (OUTPATIENT)
Dept: CALL CENTER | Facility: HOSPITAL | Age: 59
End: 2019-01-24

## 2019-01-24 NOTE — OUTREACH NOTE
Medical Week 1 Survey      Responses   Facility patient discharged from?  [place]   Does the patient have one of the following disease processes/diagnoses(primary or secondary)?  Other   Is there a successful TCM telephone encounter documented?  No   Week 1 attempt successful?  Yes   Call start time  1432   Call end time  1447   Discharge diagnosis  Gastroparesis   Meds reviewed with patient/caregiver?  Yes   Is the patient having any side effects they believe may be caused by any medication additions or changes?  No   Does the patient have all medications ordered at discharge?  Yes   Is the patient taking all medications as directed (includes completed medication regime)?  Yes   Comments regarding appointments  [facility] appt on Jan 28   Does the patient have a primary care provider?   Yes   Does the patient have an appointment with their PCP within 7 days of discharge?  Greater than 7 days   Comments regarding PCP  Dr smith/ PCP has an appt on Feb 7th    What is preventing the patient from scheduling follow up appointments within 7 days of discharge?  Earlier appointment not available   Nursing Interventions  Verified appointment date/time/provider, Educated patient on importance of making appointment, Advised patient to make appointment   Comments  , will be seeing a doctor for transplant    What is the Home health agency?   [facility] appt on Jan 28   Has home health visited the patient within 72 hours of discharge?  N/A   Psychosocial issues?  No   Comments  Patient has some bleeding coming from the roof of her mouth. She reports it was an issue while being hospitalized as well. Madhav has call into [facility] to discuss. She reports she  has a call into [facility] to report.     Did the patient receive a copy of their discharge instructions?  Yes   Nursing interventions  Reviewed instructions with patient   What is the patient's perception of their health status since discharge?  Same   Is the patient/caregiver able  to teach back signs and symptoms related to disease process for when to call PCP?  Yes   Is the patient/caregiver able to teach back signs and symptoms related to disease process for when to call 911?  Yes   Is the patient/caregiver able to teach back the hierarchy of who to call/visit for symptoms/problems? PCP, Specialist, Home health nurse, Urgent Care, ED, 911  Yes   Additional teach back comments  Patient reports bleeding from roof of mouth due to low platelets. She has a call into [facility]. No nausea or vomiting. If bleeding worsens, develops dizziness SOB, chest pain, seek medical care immediately   Week 1 call completed?  Yes          Masood Rey RN

## 2019-01-29 ENCOUNTER — LAB (OUTPATIENT)
Dept: LAB | Facility: HOSPITAL | Age: 59
End: 2019-01-29

## 2019-01-29 ENCOUNTER — INFUSION (OUTPATIENT)
Dept: ONCOLOGY | Facility: HOSPITAL | Age: 59
End: 2019-01-29

## 2019-01-29 VITALS — TEMPERATURE: 97.5 F | SYSTOLIC BLOOD PRESSURE: 122 MMHG | DIASTOLIC BLOOD PRESSURE: 74 MMHG | HEART RATE: 120 BPM

## 2019-01-29 DIAGNOSIS — D50.9 IRON DEFICIENCY ANEMIA, UNSPECIFIED IRON DEFICIENCY ANEMIA TYPE: Primary | ICD-10-CM

## 2019-01-29 DIAGNOSIS — D61.818 PANCYTOPENIA (HCC): ICD-10-CM

## 2019-01-29 DIAGNOSIS — IMO0001 ADVERSE EFFECT OF IRON OR ITS COMPOUND, SUBSEQUENT ENCOUNTER: ICD-10-CM

## 2019-01-29 DIAGNOSIS — K74.60 CIRRHOSIS OF LIVER WITHOUT ASCITES, UNSPECIFIED HEPATIC CIRRHOSIS TYPE (HCC): ICD-10-CM

## 2019-01-29 DIAGNOSIS — D69.3 ACUTE ITP (HCC): ICD-10-CM

## 2019-01-29 DIAGNOSIS — D73.1 HYPERSPLENISM: ICD-10-CM

## 2019-01-29 LAB
BASOPHILS # BLD AUTO: 0.03 10*3/MM3 (ref 0–0.1)
BASOPHILS NFR BLD AUTO: 0.8 % (ref 0–1.1)
DEPRECATED RDW RBC AUTO: 45.4 FL (ref 37–49)
EOSINOPHIL # BLD AUTO: 0.11 10*3/MM3 (ref 0–0.36)
EOSINOPHIL NFR BLD AUTO: 3.1 % (ref 1–5)
ERYTHROCYTE [DISTWIDTH] IN BLOOD BY AUTOMATED COUNT: 15.2 % (ref 11.7–14.5)
HCT VFR BLD AUTO: 31.5 % (ref 34–45)
HGB BLD-MCNC: 10.4 G/DL (ref 11.5–14.9)
IMM GRANULOCYTES # BLD AUTO: 0.03 10*3/MM3 (ref 0–0.03)
IMM GRANULOCYTES NFR BLD AUTO: 0.8 % (ref 0–0.5)
LYMPHOCYTES # BLD AUTO: 0.69 10*3/MM3 (ref 1–3.5)
LYMPHOCYTES NFR BLD AUTO: 19.4 % (ref 20–49)
MCH RBC QN AUTO: 27 PG (ref 27–33)
MCHC RBC AUTO-ENTMCNC: 33 G/DL (ref 32–35)
MCV RBC AUTO: 81.8 FL (ref 83–97)
MONOCYTES # BLD AUTO: 0.55 10*3/MM3 (ref 0.25–0.8)
MONOCYTES NFR BLD AUTO: 15.5 % (ref 4–12)
NEUTROPHILS # BLD AUTO: 2.14 10*3/MM3 (ref 1.5–7)
NEUTROPHILS NFR BLD AUTO: 60.4 % (ref 39–75)
NRBC BLD AUTO-RTO: 0 /100 WBC (ref 0–0)
PLATELET # BLD AUTO: 102 10*3/MM3 (ref 150–375)
PMV BLD AUTO: 10.6 FL (ref 8.9–12.1)
RBC # BLD AUTO: 3.85 10*6/MM3 (ref 3.9–5)
WBC NRBC COR # BLD: 3.55 10*3/MM3 (ref 4–10)

## 2019-01-29 PROCEDURE — 63710000001 PROCHLORPERAZINE MALEATE PER 10 MG: Performed by: INTERNAL MEDICINE

## 2019-01-29 PROCEDURE — 36415 COLL VENOUS BLD VENIPUNCTURE: CPT | Performed by: INTERNAL MEDICINE

## 2019-01-29 PROCEDURE — 96374 THER/PROPH/DIAG INJ IV PUSH: CPT | Performed by: INTERNAL MEDICINE

## 2019-01-29 PROCEDURE — 85025 COMPLETE CBC W/AUTO DIFF WBC: CPT

## 2019-01-29 PROCEDURE — 25010000002 FERRIC CARBOXYMALTOSE 750 MG/15ML SOLUTION 15 ML VIAL: Performed by: INTERNAL MEDICINE

## 2019-01-29 RX ORDER — SODIUM CHLORIDE 9 MG/ML
250 INJECTION, SOLUTION INTRAVENOUS ONCE
Status: COMPLETED | OUTPATIENT
Start: 2019-01-29 | End: 2019-01-29

## 2019-01-29 RX ORDER — PROCHLORPERAZINE MALEATE 10 MG
10 TABLET ORAL ONCE
Status: COMPLETED | OUTPATIENT
Start: 2019-01-29 | End: 2019-01-29

## 2019-01-29 RX ADMIN — FERRIC CARBOXYMALTOSE INJECTION 750 MG: 50 INJECTION, SOLUTION INTRAVENOUS at 13:11

## 2019-01-29 RX ADMIN — SODIUM CHLORIDE 250 ML: 9 INJECTION, SOLUTION INTRAVENOUS at 12:52

## 2019-01-29 RX ADMIN — PROCHLORPERAZINE MALEATE 10 MG: 10 TABLET, FILM COATED ORAL at 12:52

## 2019-01-30 LAB
BUN SERPL-MCNC: 12 MG/DL (ref 6–20)
BUN/CREAT SERPL: 12.4 (ref 7–25)
CALCIUM SERPL-MCNC: 9.4 MG/DL (ref 8.6–10.5)
CHLORIDE SERPL-SCNC: 91 MMOL/L (ref 98–107)
CHOLEST SERPL-MCNC: 181 MG/DL (ref 0–200)
CO2 SERPL-SCNC: 27.2 MMOL/L (ref 22–29)
CREAT SERPL-MCNC: 0.97 MG/DL (ref 0.57–1)
GLUCOSE SERPL-MCNC: 449 MG/DL (ref 65–99)
HBA1C MFR BLD: 10.1 % (ref 4.8–5.6)
HDLC SERPL-MCNC: 78 MG/DL (ref 40–60)
INTERPRETATION: NORMAL
LDLC SERPL CALC-MCNC: 91 MG/DL (ref 0–100)
Lab: NORMAL
POTASSIUM SERPL-SCNC: 4.1 MMOL/L (ref 3.5–5.2)
SODIUM SERPL-SCNC: 132 MMOL/L (ref 136–145)
TRIGL SERPL-MCNC: 59 MG/DL (ref 0–150)
TSH SERPL DL<=0.005 MIU/L-ACNC: 6.03 MIU/ML (ref 0.27–4.2)
VLDLC SERPL CALC-MCNC: 11.8 MG/DL (ref 5–40)

## 2019-01-31 ENCOUNTER — READMISSION MANAGEMENT (OUTPATIENT)
Dept: CALL CENTER | Facility: HOSPITAL | Age: 59
End: 2019-01-31

## 2019-01-31 NOTE — OUTREACH NOTE
Medical Week 2 Survey      Responses   Facility patient discharged from?  [place]   Does the patient have one of the following disease processes/diagnoses(primary or secondary)?  Other   Week 2 attempt successful?  Yes   Call start time  1609   Discharge diagnosis  Gastroparesis   Call end time  1612   Is patient permission given to speak with other caregiver?  Yes   List who call center can speak with   or mother in law Jose or Kary   Person spoke with today (if not patient) and relationship     Meds reviewed with patient/caregiver?  Yes   Is the patient having any side effects they believe may be caused by any medication additions or changes?  No   Does the patient have all medications ordered at discharge?  Yes   Is the patient taking all medications as directed (includes completed medication regime)?  Yes   Does the patient have a primary care provider?   Yes   Does the patient have an appointment with their PCP within 7 days of discharge?  Greater than 7 days   What is preventing the patient from scheduling follow up appointments within 7 days of discharge?  Earlier appointment not available   Nursing Interventions  Verified appointment date/time/provider   Has the patient kept scheduled appointments due by today?  Yes   What is the Home health agency?       Psychosocial issues?  No   Did the patient receive a copy of their discharge instructions?  Yes   Nursing interventions  Reviewed instructions with patient   What is the patient's perception of their health status since discharge?  Same   Is the patient/caregiver able to teach back signs and symptoms related to disease process for when to call PCP?  Yes   Is the patient/caregiver able to teach back signs and symptoms related to disease process for when to call 911?  Yes   Week 2 Call Completed?  Yes          Marco Mcwilliams RN

## 2019-02-01 DIAGNOSIS — D50.9 IRON DEFICIENCY ANEMIA, UNSPECIFIED IRON DEFICIENCY ANEMIA TYPE: ICD-10-CM

## 2019-02-01 DIAGNOSIS — IMO0001 ADVERSE EFFECT OF IRON OR ITS COMPOUND, SUBSEQUENT ENCOUNTER: ICD-10-CM

## 2019-02-04 RX ORDER — SPIRONOLACTONE 100 MG/1
TABLET, FILM COATED ORAL
Qty: 30 TABLET | Refills: 2 | Status: SHIPPED | OUTPATIENT
Start: 2019-02-04

## 2019-02-04 RX ORDER — RIFAXIMIN 550 MG/1
TABLET ORAL
Qty: 60 TABLET | Refills: 2 | Status: SHIPPED | OUTPATIENT
Start: 2019-02-04 | End: 2021-03-26 | Stop reason: SDUPTHER

## 2019-02-04 RX ORDER — PANTOPRAZOLE SODIUM 40 MG/1
TABLET, DELAYED RELEASE ORAL
Qty: 30 TABLET | Refills: 2 | Status: SHIPPED | OUTPATIENT
Start: 2019-02-04 | End: 2020-12-10 | Stop reason: ALTCHOICE

## 2019-02-05 ENCOUNTER — APPOINTMENT (OUTPATIENT)
Dept: LAB | Facility: HOSPITAL | Age: 59
End: 2019-02-05

## 2019-02-05 ENCOUNTER — APPOINTMENT (OUTPATIENT)
Dept: ONCOLOGY | Facility: HOSPITAL | Age: 59
End: 2019-02-05

## 2019-02-08 ENCOUNTER — NURSE TRIAGE (OUTPATIENT)
Dept: CALL CENTER | Facility: HOSPITAL | Age: 59
End: 2019-02-08

## 2019-02-08 ENCOUNTER — TELEPHONE (OUTPATIENT)
Dept: INTERNAL MEDICINE | Facility: CLINIC | Age: 59
End: 2019-02-08

## 2019-02-08 ENCOUNTER — READMISSION MANAGEMENT (OUTPATIENT)
Dept: CALL CENTER | Facility: HOSPITAL | Age: 59
End: 2019-02-08

## 2019-02-08 DIAGNOSIS — E72.20 HYPERAMMONEMIA (HCC): ICD-10-CM

## 2019-02-08 DIAGNOSIS — K31.84 GASTROPARESIS: Primary | ICD-10-CM

## 2019-02-08 DIAGNOSIS — IMO0002 TYPE 2 DIABETES MELLITUS, UNCONTROLLED, WITH NEUROPATHY: ICD-10-CM

## 2019-02-08 DIAGNOSIS — K74.60 CIRRHOSIS OF LIVER WITHOUT ASCITES, UNSPECIFIED HEPATIC CIRRHOSIS TYPE (HCC): ICD-10-CM

## 2019-02-08 NOTE — TELEPHONE ENCOUNTER
"She was discharged from [facility] 1/22 was told could call this number and talk to  for Dr. Mckeon. Ect. , per sister, and then sister said her memory is not good either and she has been getting weaker and really needs Home Health to see her, she thinks. I told her to call her PCP and see if they could get an order for this or I can Email Case Management if needed, she will try PCP and call us back if needed. Pt. Has been falling, sister states is weak at home and has many illnesses, that contribute to this weakess.     Reason for Disposition  • [1] MODERATE weakness (i.e., interferes with work, school, normal activities) AND [2] cause unknown  (Exceptions: weakness with acute minor illness, or weakness from poor fluid intake)    Additional Information  • Negative: Severe difficulty breathing (e.g., struggling for each breath, speaks in single words)  • Negative: Shock suspected (e.g., cold/pale/clammy skin, too weak to stand, low BP, rapid pulse)  • Negative: Difficult to awaken or acting confused (e.g., disoriented, slurred speech)  • Negative: [1] Fainted > 15 minutes ago AND [2] still feels too weak or dizzy to stand  • Negative: [1] SEVERE weakness (i.e., unable to walk or barely able to walk, requires support) AND     [2] new onset or worsening  • Negative: Sounds like a life-threatening emergency to the triager  • Negative: Weakness of the face, arm or leg on one side of the body  • Negative: [1] Has diabetes (diabetes mellitus) AND [2] weakness from low blood sugar (i.e., < 60 mg/dl or 3.5 mmol/l)  • Negative: Heat exhaustion suspected (i.e., dehydration from heat exposure)  • Negative: Vomiting is main symptom  • Negative: Diarrhea is main symptom  • Negative: Difficulty breathing  • Negative: Heart beating < 50 beats per minute OR > 140 beats per minute  • Negative: Extra heart beats OR irregular heart beating   (i.e., \"palpitations\")  • Negative: Follows bleeding (e.g., from vomiting, rectum, " "vagina; EXCEPTION: small brief weakness from sight of a small amount blood)  • Negative: Black or tarry bowel movements  • Negative: [1] Drinking very little AND [2] dehydration suspected (e.g., no urine > 12 hours, very dry mouth, very lightheaded)  • Negative: Patient sounds very sick or weak to the triager  • Negative: [1] MODERATE weakness AND [2] from poor fluid intake AND [3] no improvement after 2 hours of rest and fluids  • Negative: [1] Fever > 103 F (39.4 C) AND [2] not able to get the fever down using Fever Care Advice    Answer Assessment - Initial Assessment Questions  1. DESCRIPTION: \"Describe how you are feeling.\"      She is weak, has been falling, per sister, and memory no good  2. SEVERITY: \"How bad is it?\"  \"Can you stand and walk?\"    - MILD - Feels weak or tired, but does not interfere with work, school or normal activities    - MODERATE - Able to stand and walk; weakness interferes with work, school, or normal activities    - SEVERE - Unable to stand or walk      Very unsteady, needs order for PT per sister  3. ONSET:  \"When did the weakness begin?\"      Has been coming on for a while  4. CAUSE: \"What do you think is causing the weakness?\"      illnesses  5. MEDICINES: \"Have you recently started a new medicine or had a change in the amount of a medicine?\"      yes  6. OTHER SYMPTOMS: \"Do you have any other symptoms?\" (e.g., chest pain, fever, cough, SOB, vomiting, diarrhea, bleeding)      Several illnesses and diabetes  7. PREGNANCY: \"Is there any chance you are pregnant?\" \"When was your last menstrual period?\"      no    Protocols used: WEAKNESS (GENERALIZED) AND FATIGUE-ADULT-AH      "

## 2019-02-08 NOTE — OUTREACH NOTE
Medical Week 3 Survey      Responses   Facility patient discharged from?  [place]   Does the patient have one of the following disease processes/diagnoses(primary or secondary)?  Other   Week 3 attempt successful?  No   Unsuccessful attempts  Attempt 1          Marco Mcwilliams RN

## 2019-02-08 NOTE — TELEPHONE ENCOUNTER
----- Message from Jamee Hurtado sent at 2/8/2019 12:30 PM EST -----  Contact: Patient's sister  Patient's sister, Emma, (on release form), called requesting home health for the patient.  States patient has difficulty ambulatinmg, nauseated with abdominal pain, and generalized body pain.  Please advise.     Emma, sister:  [phone]

## 2019-02-11 ENCOUNTER — READMISSION MANAGEMENT (OUTPATIENT)
Dept: CALL CENTER | Facility: HOSPITAL | Age: 59
End: 2019-02-11

## 2019-02-11 NOTE — OUTREACH NOTE
Medical Week 3 Survey      Responses   Facility patient discharged from?  [place]   Does the patient have one of the following disease processes/diagnoses(primary or secondary)?  Other   Week 3 attempt successful?  No   Call start time  105   Unsuccessful attempts  Attempt 2          Katalina Prather RN

## 2019-02-12 ENCOUNTER — TELEPHONE (OUTPATIENT)
Dept: ONCOLOGY | Facility: CLINIC | Age: 59
End: 2019-02-12

## 2019-02-12 NOTE — TELEPHONE ENCOUNTER
----- Message from Sukhdeep Mcdowell MD sent at 2/11/2019  5:42 PM EST -----  Regarding: RE: PAST MISSED 2ND INJECTAFER, DOES SHE STILL NEED THE 2ND DOSE?  Yes, please schedule the Injectafer  ----- Message -----  From: Ashli Dyson  Sent: 2/11/2019   4:02 PM  To: Sukhdeep Mcdowell MD  Subject: PAST MISSED 2ND INJECTAFER, DOES SHE STILL N#    PT WAS SCHEDULED FOR HER 2ND INJECTAFER ON 2-5-19 BUT MISSED IT BECAUSE SHE WAS SICK.  FAMILY IS CALLING ASKING IF THEY SHOULD RESCHEDULE THIS?  PT IS SCHEDULED TO SEE YOU ON 2-26-19.    THANKS!!    HANG

## 2019-02-13 ENCOUNTER — LAB (OUTPATIENT)
Dept: LAB | Facility: HOSPITAL | Age: 59
End: 2019-02-13

## 2019-02-13 ENCOUNTER — OFFICE VISIT (OUTPATIENT)
Dept: INTERNAL MEDICINE | Facility: CLINIC | Age: 59
End: 2019-02-13

## 2019-02-13 ENCOUNTER — INFUSION (OUTPATIENT)
Dept: ONCOLOGY | Facility: HOSPITAL | Age: 59
End: 2019-02-13

## 2019-02-13 VITALS
DIASTOLIC BLOOD PRESSURE: 75 MMHG | BODY MASS INDEX: 27.98 KG/M2 | HEART RATE: 88 BPM | WEIGHT: 176 LBS | SYSTOLIC BLOOD PRESSURE: 122 MMHG | TEMPERATURE: 98 F

## 2019-02-13 VITALS
HEIGHT: 65 IN | BODY MASS INDEX: 29.32 KG/M2 | DIASTOLIC BLOOD PRESSURE: 60 MMHG | SYSTOLIC BLOOD PRESSURE: 112 MMHG | WEIGHT: 176 LBS

## 2019-02-13 DIAGNOSIS — D50.9 IRON DEFICIENCY ANEMIA, UNSPECIFIED IRON DEFICIENCY ANEMIA TYPE: Primary | ICD-10-CM

## 2019-02-13 DIAGNOSIS — IMO0001 ADVERSE EFFECT OF IRON OR ITS COMPOUND, SUBSEQUENT ENCOUNTER: ICD-10-CM

## 2019-02-13 DIAGNOSIS — Z79.899 ENCOUNTER FOR MEDICATION REVIEW: ICD-10-CM

## 2019-02-13 DIAGNOSIS — D50.9 IRON DEFICIENCY ANEMIA, UNSPECIFIED IRON DEFICIENCY ANEMIA TYPE: ICD-10-CM

## 2019-02-13 DIAGNOSIS — K76.82 HEPATIC ENCEPHALOPATHY (HCC): Primary | ICD-10-CM

## 2019-02-13 LAB
BASOPHILS # BLD AUTO: 0.03 10*3/MM3 (ref 0–0.2)
BASOPHILS NFR BLD AUTO: 0.7 % (ref 0–1.5)
DEPRECATED RDW RBC AUTO: 48.4 FL (ref 37–54)
EOSINOPHIL # BLD AUTO: 0.11 10*3/MM3 (ref 0–0.4)
EOSINOPHIL NFR BLD AUTO: 2.4 % (ref 0.3–6.2)
ERYTHROCYTE [DISTWIDTH] IN BLOOD BY AUTOMATED COUNT: 16.6 % (ref 12.3–15.4)
HCT VFR BLD AUTO: 34.9 % (ref 34–46.6)
HGB BLD-MCNC: 12.1 G/DL (ref 12–15.9)
IMM GRANULOCYTES # BLD AUTO: 0.02 10*3/MM3 (ref 0–0.05)
IMM GRANULOCYTES NFR BLD AUTO: 0.4 % (ref 0–0.5)
LYMPHOCYTES # BLD AUTO: 0.74 10*3/MM3 (ref 0.7–3.1)
LYMPHOCYTES NFR BLD AUTO: 16.3 % (ref 19.6–45.3)
MCH RBC QN AUTO: 28.1 PG (ref 26.6–33)
MCHC RBC AUTO-ENTMCNC: 34.7 G/DL (ref 31.5–35.7)
MCV RBC AUTO: 81.2 FL (ref 79–97)
MONOCYTES # BLD AUTO: 0.47 10*3/MM3 (ref 0.1–0.9)
MONOCYTES NFR BLD AUTO: 10.3 % (ref 5–12)
NEUTROPHILS # BLD AUTO: 3.18 10*3/MM3 (ref 1.4–7)
NEUTROPHILS NFR BLD AUTO: 69.9 % (ref 42.7–76)
NRBC BLD AUTO-RTO: 0 /100 WBC (ref 0–0)
PLATELET # BLD AUTO: 94 10*3/MM3 (ref 140–450)
PMV BLD AUTO: 11.3 FL (ref 6–12)
RBC # BLD AUTO: 4.3 10*6/MM3 (ref 3.77–5.28)
WBC NRBC COR # BLD: 4.55 10*3/MM3 (ref 3.4–10.8)

## 2019-02-13 PROCEDURE — 96374 THER/PROPH/DIAG INJ IV PUSH: CPT | Performed by: INTERNAL MEDICINE

## 2019-02-13 PROCEDURE — 99213 OFFICE O/P EST LOW 20 MIN: CPT | Performed by: INTERNAL MEDICINE

## 2019-02-13 PROCEDURE — 85025 COMPLETE CBC W/AUTO DIFF WBC: CPT | Performed by: INTERNAL MEDICINE

## 2019-02-13 PROCEDURE — 25010000002 FERRIC CARBOXYMALTOSE 750 MG/15ML SOLUTION 15 ML VIAL: Performed by: INTERNAL MEDICINE

## 2019-02-13 PROCEDURE — 36416 COLLJ CAPILLARY BLOOD SPEC: CPT | Performed by: INTERNAL MEDICINE

## 2019-02-13 PROCEDURE — 63710000001 PROCHLORPERAZINE MALEATE PER 10 MG: Performed by: INTERNAL MEDICINE

## 2019-02-13 RX ORDER — SODIUM CHLORIDE 9 MG/ML
250 INJECTION, SOLUTION INTRAVENOUS ONCE
Status: COMPLETED | OUTPATIENT
Start: 2019-02-13 | End: 2019-02-13

## 2019-02-13 RX ORDER — PROCHLORPERAZINE MALEATE 10 MG
10 TABLET ORAL ONCE
Status: COMPLETED | OUTPATIENT
Start: 2019-02-13 | End: 2019-02-13

## 2019-02-13 RX ORDER — POLYETHYLENE GLYCOL 3350 17 G/17G
17 POWDER, FOR SOLUTION ORAL DAILY PRN
COMMUNITY

## 2019-02-13 RX ADMIN — PROCHLORPERAZINE MALEATE 10 MG: 10 TABLET, FILM COATED ORAL at 11:36

## 2019-02-13 RX ADMIN — SODIUM CHLORIDE 250 ML: 9 INJECTION, SOLUTION INTRAVENOUS at 11:37

## 2019-02-13 RX ADMIN — FERRIC CARBOXYMALTOSE INJECTION 750 MG: 50 INJECTION, SOLUTION INTRAVENOUS at 11:46

## 2019-02-13 NOTE — PROGRESS NOTES
Chief Complaint   Patient presents with   • Medication Problem     discuss medications, concerns of confusion    • Altered Mental Status       Subjective   Silvia Zabala is a 58 y.o. female.     History of Present Illness       She was recently hospitalized with uncontrolled diabetes.  She states her blood sugars have been better.  She has follow-up with endocrinology tomorrow.  She is having problems with altered mental status.  She is on a number of medications that could be contributing to this.  In addition, she has DUKES, liver cirrhosis, hepatic encephalopathy.  She has been having problems at home with confusion.  Also, she reports poor balance.  Home health has started.  The nurses come out to evaluate her.  She will be having physical therapy.  Her appetite is poor.  She is trying to eat regular meals.  She has ongoing problems with nausea and vomiting related to gastroparesis.  She has also noted some trouble swallowing recently.  She is scheduled to have an endoscopy.      The following portions of the patient's history were reviewed and updated as appropriate: allergies, current medications, past family history, past medical history, past social history, past surgical history and problem list.    Review of Systems   Constitutional: Positive for appetite change.   HENT: Positive for trouble swallowing.    Respiratory: Positive for choking. Negative for cough and shortness of breath.    Cardiovascular: Positive for chest pain (doesn't last long.  no specific prescipitating  factors ). Negative for palpitations and leg swelling.   Gastrointestinal: Positive for abdominal distention.   Neurological: Positive for headache (migraines).         Current Outpatient Medications:   •  ALPRAZolam (XANAX) 0.5 MG tablet, Take 1 tablet by mouth 2 (Two) Times a Day As Needed for Anxiety. (Patient taking differently: Take 0.5 mg by mouth Daily.), Disp: 60 tablet, Rfl: 1  •  amitriptyline (ELAVIL) 50 MG tablet, Take 1  tablet by mouth Every Night., Disp: 30 tablet, Rfl: 0  •  cholecalciferol 1000 units tablet, Take 1,000 Units by mouth Daily., Disp: , Rfl:   •  Continuous Blood Gluc Sensor (FREESTYLE YESIKA SENSOR SYSTEM), 1 Device Every 14 (Fourteen) Days., Disp: 2 each, Rfl: 3  •  cyclobenzaprine (FLEXERIL) 5 MG tablet, Take 5 mg by mouth every night at bedtime., Disp: , Rfl:   •  DULoxetine (CYMBALTA) 30 MG capsule, Take 90 mg by mouth Daily., Disp: , Rfl:   •  folic acid (FOLVITE) 1 MG tablet, Take 1 mg by mouth Daily., Disp: , Rfl:   •  furosemide (LASIX) 40 MG tablet, Take 1 tablet by mouth Daily., Disp: 30 tablet, Rfl: 2  •  hydrOXYzine (ATARAX) 25 MG tablet, Take 50 mg by mouth 3 (Three) Times a Day As Needed for Itching., Disp: , Rfl:   •  Insulin Glargine (LANTUS SOLOSTAR) 100 UNIT/ML injection pen, Inject 40 Units under the skin into the appropriate area as directed Every 12 (Twelve) Hours., Disp: 10 pen, Rfl: 11  •  insulin lispro (humaLOG) 100 UNIT/ML injection, 18 units TID with meals, plus 1 unit per 40 greater than 150, Disp: 30 mL, Rfl: 11  •  levETIRAcetam (KEPPRA) 500 MG tablet, Take 500 mg by mouth 2 (Two) Times a Day., Disp: , Rfl:   •  Multiple Vitamins-Minerals (MULTIVITAMIN WITH MINERALS) tablet tablet, Take 1 tablet by mouth Daily., Disp: , Rfl:   •  OxyCODONE HCl (OXAYDO) 7.5 MG tablet , Take 7.5 mg by mouth Every 8 (Eight) Hours As Needed., Disp: , Rfl:   •  pantoprazole (PROTONIX) 40 MG EC tablet, TAKE ONE TABLET BY MOUTH DAILY, Disp: 30 tablet, Rfl: 2  •  polyethylene glycol (MIRALAX) powder, Take 17 g by mouth Daily., Disp: , Rfl:   •  pregabalin (LYRICA) 75 MG capsule, Take 75 mg by mouth 2 (Two) Times a Day., Disp: , Rfl:   •  promethazine (PHENERGAN) 25 MG tablet, Take 1 tablet by mouth Every 6 (Six) Hours As Needed for Nausea or Vomiting., Disp: 30 tablet, Rfl: 0  •  spironolactone (ALDACTONE) 100 MG tablet, TAKE ONE TABLET BY MOUTH DAILY, Disp: 30 tablet, Rfl: 2  •  trimethobenzamide (TIGAN) 300  "MG capsule, Take 300 mg by mouth 3 (Three) Times a Day., Disp: , Rfl:   •  vitamin B-12 (CYANOCOBALAMIN) 1000 MCG tablet, Take 1,000 mcg by mouth daily., Disp: , Rfl:   •  XIFAXAN 550 MG tablet, TAKE ONE TABLET BY MOUTH TWICE A DAY, Disp: 60 tablet, Rfl: 2  •  ZINC GLUCONATE PO, Take 1 tablet by mouth Daily., Disp: , Rfl:   No current facility-administered medications for this visit.         Objective     /60   Ht 165.1 cm (65\")   Wt 79.8 kg (176 lb)   BMI 29.29 kg/m²     Physical Exam   Constitutional: She appears well-developed and well-nourished.   She appears chronically ill She is obese.  Eyes: No scleral icterus.   Neck: Neck supple.   Cardiovascular: Normal rate, regular rhythm and normal heart sounds. Exam reveals no gallop and no friction rub.   No murmur heard.  Pulmonary/Chest: Effort normal and breath sounds normal. No respiratory distress.   Abdominal: Soft.   Neurological: She is alert.   Skin: Skin is warm and dry.   Nursing note and vitals reviewed.        Assessment/Plan   Silvia was seen today for medication problem and altered mental status.    Diagnoses and all orders for this visit:    Hepatic encephalopathy (CMS/HCC)    Encounter for medication review      Encouraged her to continue prudent diet.  She has follow-up scheduled with endocrinology and gastroenterology.  She has an endoscopy study scheduled for further evaluation of difficulty swallowing.  Reviewed her medications.  Advised her that she is on several that can contribute to mental status changes and confusion.  She is going to try to stop taking Xanax.         "

## 2019-02-14 ENCOUNTER — OFFICE VISIT (OUTPATIENT)
Dept: ENDOCRINOLOGY | Age: 59
End: 2019-02-14

## 2019-02-14 ENCOUNTER — TELEPHONE (OUTPATIENT)
Dept: INTERNAL MEDICINE | Facility: CLINIC | Age: 59
End: 2019-02-14

## 2019-02-14 ENCOUNTER — OFFICE VISIT (OUTPATIENT)
Dept: GASTROENTEROLOGY | Facility: CLINIC | Age: 59
End: 2019-02-14

## 2019-02-14 VITALS
SYSTOLIC BLOOD PRESSURE: 113 MMHG | DIASTOLIC BLOOD PRESSURE: 72 MMHG | HEIGHT: 68 IN | BODY MASS INDEX: 26.83 KG/M2 | WEIGHT: 177 LBS

## 2019-02-14 VITALS
DIASTOLIC BLOOD PRESSURE: 60 MMHG | WEIGHT: 177.8 LBS | HEIGHT: 68 IN | BODY MASS INDEX: 26.95 KG/M2 | HEART RATE: 99 BPM | SYSTOLIC BLOOD PRESSURE: 102 MMHG

## 2019-02-14 DIAGNOSIS — R11.2 NON-INTRACTABLE VOMITING WITH NAUSEA, UNSPECIFIED VOMITING TYPE: ICD-10-CM

## 2019-02-14 DIAGNOSIS — D50.0 IRON DEFICIENCY ANEMIA DUE TO CHRONIC BLOOD LOSS: Primary | ICD-10-CM

## 2019-02-14 DIAGNOSIS — K75.81 NASH (NONALCOHOLIC STEATOHEPATITIS): ICD-10-CM

## 2019-02-14 DIAGNOSIS — M79.7 FIBROSITIS: ICD-10-CM

## 2019-02-14 DIAGNOSIS — D69.3 ACUTE ITP (HCC): ICD-10-CM

## 2019-02-14 DIAGNOSIS — K62.5 HEMORRHAGE OF ANUS AND RECTUM: ICD-10-CM

## 2019-02-14 DIAGNOSIS — IMO0002 TYPE 2 DIABETES MELLITUS, UNCONTROLLED, WITH NEUROPATHY: Primary | ICD-10-CM

## 2019-02-14 DIAGNOSIS — E78.5 HYPERLIPIDEMIA, UNSPECIFIED HYPERLIPIDEMIA TYPE: ICD-10-CM

## 2019-02-14 DIAGNOSIS — E11.42 DIABETIC PERIPHERAL NEUROPATHY (HCC): ICD-10-CM

## 2019-02-14 DIAGNOSIS — K31.84 GASTROPARESIS: ICD-10-CM

## 2019-02-14 DIAGNOSIS — K74.69 OTHER CIRRHOSIS OF LIVER (HCC): ICD-10-CM

## 2019-02-14 PROCEDURE — 99214 OFFICE O/P EST MOD 30 MIN: CPT | Performed by: INTERNAL MEDICINE

## 2019-02-14 PROCEDURE — 99215 OFFICE O/P EST HI 40 MIN: CPT | Performed by: INTERNAL MEDICINE

## 2019-02-14 RX ORDER — SODIUM CHLORIDE, SODIUM LACTATE, POTASSIUM CHLORIDE, CALCIUM CHLORIDE 600; 310; 30; 20 MG/100ML; MG/100ML; MG/100ML; MG/100ML
30 INJECTION, SOLUTION INTRAVENOUS CONTINUOUS
Status: CANCELLED | OUTPATIENT
Start: 2019-04-02

## 2019-02-14 NOTE — PROGRESS NOTES
58 y.o.    Patient Care Team:  Blanca Pitts MD as PCP - General (Internal Medicine)  Blanca Pitts MD as PCP - Claims Attributed  Sukhdeep Mcdowell MD as Consulting Physician (Hematology and Oncology)  Blanca Pitts MD as Referring Physician (Internal Medicine)  Rich Coleman MD as Consulting Physician (Gastroenterology)  Merary Burnett MD as Consulting Physician (Endocrinology)    Chief Complaint:      FOLLOW UP APPOINTMENT/ TYPE 2 DIABETES MELLITUS  HERE TO DISCUSS LAB RESULTS.  Subjective     HPI    58-year-old white female with a complicated past medical history has been doing poorly in terms of her type 2 diabetes mellitus, gastroparesis and ITP.    Type 2 diabetes mellitus-diagnosed in her 20s.  She has been on insulin since then.  Due to her extremely uncontrolled blood sugar she has been on U-500 insulin at one time.  Patient has been hospitalized every 2-3 months for the last 1 year or either due to gastroparesis flareup or severely elevated blood sugars.  Today in the clinic she reports that she is on Lantus 40 units twice daily, Humalog 18 units with each meal plus a sliding scale 3 units for 50 above 150.  However patient is not taking the Humalog if she is not eating due to her gastroparesis flareup and decreased appetite.  She has been trying to check at least 4-6 times a day.  Most of the blood sugars have been running either in 300s or high.  She does complain of increased urination and thirst.  Last eye examination was in January 2018, no history of diabetic retinopathy.  Does have history of diabetic neuropathy.  Diet is poorly controlled due to the history of gastroparesis.    During one of her prior visits patient has been given continuous glucose monitoring as a sample from our office but patient is not able to use it since the supplies are not being covered by the insurance.      Pancreatic divisum : No hx of pancreatitis per pt.       Liver disease/DUKES -  Sees Dr. Jordan from [facility].        Gastroparesis - Sees Dr. Coleman. Getting worse.  Not a candidate for stimulator due to ITP      ITP - diagnosed in May 2018. Currently stable. Seen by Hematology and oncology.         Reviewed primary care physician's/consulting physician documentation and lab results :     Interval History      The following portions of the patient's history were reviewed and updated as appropriate: allergies, current medications, past family history, past medical history, past social history, past surgical history and problem list.    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   • Cirrhosis (CMS/HCC)    • Colon polyps    • Deafness    • Depression    • Diabetes mellitus (CMS/HCC)     Adult onset, insulin requiring   • Duodenal ulcer with hemorrhage    • Esophageal varices determined by endoscopy (CMS/HCC)    • Fibromyalgia    • Gallbladder disease    • Gastric varices    • Gastroparesis    • Glaucoma    • Granuloma annulare    • H/O B12 deficiency    • H/O Bleeding ulcer     And gastroesophageal varices   • H/O mixed connective tissue disorder    • Hemorrhoids    • Hiatal hernia    • History of transfusion    • Hx of being hospitalized     In [place] for GI bleeding from ulcer and varices   • Hyperlipidemia    • Migraine    • DUKES (nonalcoholic steatohepatitis) 11/2016   • BRICE (obstructive sleep apnea)    • Pancreas divisum    • Pancytopenia (CMS/HCC)     Chronic, due to cirrhosis and hypersplenism   • Peptic ulcer disease     And esophageal varices   • PONV (postoperative nausea and vomiting)    • RA (rheumatoid arthritis) (CMS/HCC)    • Seizure disorder (CMS/HCC)     LAST ONE SEVERAL YEARS AGO   • Seizures (CMS/HCC)    • Systemic lupus (CMS/HCC)      Family History   Problem Relation Age of Onset   •  "Liver disease Other    • Depression Other    • Heart disease Other    • Hypertension Other    • Diabetes Other    • Breast cancer Other    • Brain cancer Other    • Uterine cancer Other    • Colon cancer Other    • Leukemia Other    • Colon cancer Maternal Aunt    • Hypertension Mother    • Diabetes type II Mother    • Rheum arthritis Mother    • Liver disease Mother         DUKES   • Heart disease Father    • Diabetes type II Father    • Diabetes type II Sister    • Lupus Sister    • Malig Hyperthermia Neg Hx      Social History     Socioeconomic History   • Marital status:      Spouse name: Jose   • Number of children: Not on file   • Years of education: Not on file   • Highest education level: Not on file   Social Needs   • Financial resource strain: Not on file   • Food insecurity - worry: Not on file   • Food insecurity - inability: Not on file   • Transportation needs - medical: Not on file   • Transportation needs - non-medical: Not on file   Occupational History     Employer: DISABLED   Tobacco Use   • Smoking status: Former Smoker     Packs/day: 0.00     Years: 0.00     Pack years: 0.00     Last attempt to quit: 12/17/2015     Years since quitting: 3.1   • Smokeless tobacco: Never Used   Substance and Sexual Activity   • Alcohol use: No   • Drug use: No   • Sexual activity: Defer   Other Topics Concern   • Not on file   Social History Narrative    Moved to [place] from [place]. She is currently medically disabled.     Allergies   Allergen Reactions   • Albuterol Anaphylaxis   • Tramadol Nausea Only, Other (See Comments) and GI Intolerance     Per pt causes her \"palsy, meaning shaking and tremors\"    • Lactulose Nausea And Vomiting and Other (See Comments)     Severe abdominal pain   • Quinine Derivatives Nausea And Vomiting       Current Outpatient Medications:   •  ALPRAZolam (XANAX) 0.5 MG tablet, Take 1 tablet by mouth 2 (Two) Times a Day As Needed for Anxiety. (Patient taking differently: " Take 0.5 mg by mouth Daily.), Disp: 60 tablet, Rfl: 1  •  amitriptyline (ELAVIL) 50 MG tablet, Take 1 tablet by mouth Every Night., Disp: 30 tablet, Rfl: 0  •  cholecalciferol 1000 units tablet, Take 1,000 Units by mouth Daily., Disp: , Rfl:   •  Continuous Blood Gluc Sensor (FREESTYLE YESIKA SENSOR SYSTEM), 1 Device Every 14 (Fourteen) Days., Disp: 2 each, Rfl: 3  •  cyclobenzaprine (FLEXERIL) 5 MG tablet, Take 5 mg by mouth every night at bedtime., Disp: , Rfl:   •  DULoxetine (CYMBALTA) 30 MG capsule, Take 90 mg by mouth Daily., Disp: , Rfl:   •  folic acid (FOLVITE) 1 MG tablet, Take 1 mg by mouth Daily., Disp: , Rfl:   •  furosemide (LASIX) 40 MG tablet, Take 1 tablet by mouth Daily., Disp: 30 tablet, Rfl: 2  •  hydrOXYzine (ATARAX) 25 MG tablet, Take 50 mg by mouth 3 (Three) Times a Day As Needed for Itching., Disp: , Rfl:   •  Insulin Glargine (LANTUS SOLOSTAR) 100 UNIT/ML injection pen, Inject 40 Units under the skin into the appropriate area as directed Every 12 (Twelve) Hours., Disp: 10 pen, Rfl: 11  •  insulin lispro (humaLOG) 100 UNIT/ML injection, 18 units TID with meals, plus 1 unit per 40 greater than 150, Disp: 30 mL, Rfl: 11  •  levETIRAcetam (KEPPRA) 500 MG tablet, Take 500 mg by mouth 2 (Two) Times a Day., Disp: , Rfl:   •  Multiple Vitamins-Minerals (MULTIVITAMIN WITH MINERALS) tablet tablet, Take 1 tablet by mouth Daily., Disp: , Rfl:   •  OxyCODONE HCl (OXAYDO) 7.5 MG tablet , Take 7.5 mg by mouth Every 8 (Eight) Hours As Needed., Disp: , Rfl:   •  pantoprazole (PROTONIX) 40 MG EC tablet, TAKE ONE TABLET BY MOUTH DAILY, Disp: 30 tablet, Rfl: 2  •  polyethylene glycol (MIRALAX) powder, Take 17 g by mouth Daily., Disp: , Rfl:   •  pregabalin (LYRICA) 75 MG capsule, Take 75 mg by mouth 2 (Two) Times a Day., Disp: , Rfl:   •  promethazine (PHENERGAN) 25 MG tablet, Take 1 tablet by mouth Every 6 (Six) Hours As Needed for Nausea or Vomiting., Disp: 30 tablet, Rfl: 0  •  spironolactone (ALDACTONE) 100  "MG tablet, TAKE ONE TABLET BY MOUTH DAILY, Disp: 30 tablet, Rfl: 2  •  trimethobenzamide (TIGAN) 300 MG capsule, Take 300 mg by mouth 3 (Three) Times a Day., Disp: , Rfl:   •  vitamin B-12 (CYANOCOBALAMIN) 1000 MCG tablet, Take 1,000 mcg by mouth daily., Disp: , Rfl:   •  XIFAXAN 550 MG tablet, TAKE ONE TABLET BY MOUTH TWICE A DAY, Disp: 60 tablet, Rfl: 2  •  ZINC GLUCONATE PO, Take 1 tablet by mouth Daily., Disp: , Rfl:   •  trimethobenzamide (TIGAN) 300 MG capsule, Every 8 (Eight) Hours., Disp: , Rfl:         Review of Systems   Constitutional: Positive for chills, fatigue and fever.   Cardiovascular: Negative for chest pain and palpitations.   Gastrointestinal: Positive for abdominal pain, constipation, diarrhea, nausea and vomiting.   Endocrine: Positive for cold intolerance. Negative for heat intolerance.       Objective       Vitals:    02/14/19 1222   BP: 102/60   Pulse: 99   Weight: 80.6 kg (177 lb 12.8 oz)   Height: 171.5 cm (67.5\")     Body mass index is 27.44 kg/m².      Physical Exam   Constitutional: She is oriented to person, place, and time.   HENT:   Head: Normocephalic and atraumatic.   Eyes: Conjunctivae and EOM are normal. No scleral icterus.   Neck: Normal range of motion. Neck supple. No thyromegaly present.   Cardiovascular: Normal rate. Exam reveals no friction rub.   Murmur heard.  Pulmonary/Chest: Effort normal and breath sounds normal. No stridor. She has no wheezes. She has no rales.   Abdominal: Soft. Bowel sounds are normal. She exhibits distension. There is no tenderness.   Musculoskeletal: She exhibits no edema or tenderness.   Lymphadenopathy:     She has no cervical adenopathy.   Neurological: She is alert and oriented to person, place, and time.   Skin: Skin is warm and dry. She is not diaphoretic.   Psychiatric: She has a normal mood and affect.   Vitals reviewed.    Results Review:     I reviewed the patient's new clinical results and mentioned them above in HPI and in plan as " well.    Medical records reviewed  Summary: done      Lab on 02/13/2019   Component Date Value Ref Range Status   • WBC 02/13/2019 4.55  3.40 - 10.80 10*3/mm3 Final   • RBC 02/13/2019 4.30  3.77 - 5.28 10*6/mm3 Final   • Hemoglobin 02/13/2019 12.1  12.0 - 15.9 g/dL Final   • Hematocrit 02/13/2019 34.9  34.0 - 46.6 % Final   • MCV 02/13/2019 81.2  79.0 - 97.0 fL Final   • MCH 02/13/2019 28.1  26.6 - 33.0 pg Final   • MCHC 02/13/2019 34.7  31.5 - 35.7 g/dL Final   • RDW 02/13/2019 16.6* 12.3 - 15.4 % Final   • RDW-SD 02/13/2019 48.4  37.0 - 54.0 fl Final   • MPV 02/13/2019 11.3  6.0 - 12.0 fL Final   • Platelets 02/13/2019 94* 140 - 450 10*3/mm3 Final   • Neutrophil % 02/13/2019 69.9  42.7 - 76.0 % Final   • Lymphocyte % 02/13/2019 16.3* 19.6 - 45.3 % Final   • Monocyte % 02/13/2019 10.3  5.0 - 12.0 % Final   • Eosinophil % 02/13/2019 2.4  0.3 - 6.2 % Final   • Basophil % 02/13/2019 0.7  0.0 - 1.5 % Final   • Immature Grans % 02/13/2019 0.4  0.0 - 0.5 % Final   • Neutrophils, Absolute 02/13/2019 3.18  1.40 - 7.00 10*3/mm3 Final   • Lymphocytes, Absolute 02/13/2019 0.74  0.70 - 3.10 10*3/mm3 Final   • Monocytes, Absolute 02/13/2019 0.47  0.10 - 0.90 10*3/mm3 Final   • Eosinophils, Absolute 02/13/2019 0.11  0.00 - 0.40 10*3/mm3 Final   • Basophils, Absolute 02/13/2019 0.03  0.00 - 0.20 10*3/mm3 Final   • Immature Grans, Absolute 02/13/2019 0.02  0.00 - 0.05 10*3/mm3 Final   • nRBC 02/13/2019 0.0  0.0 - 0.0 /100 WBC Final     Lab Results   Component Value Date    HGBA1C 10.10 (H) 01/29/2019    HGBA1C 10.50 (H) 01/17/2019    HGBA1C 9.20 (H) 12/13/2018     Lab Results   Component Value Date    MICROALBUR <1.2 12/15/2018    CREATININE 0.97 01/29/2019     Imaging Results (most recent)     None                              Assessment and Plan:    Silvia was seen today for diabetes.    Diagnoses and all orders for this visit:    Type 2 diabetes mellitus, uncontrolled, with neuropathy (CMS/[facility])  -     Basic Metabolic Panel;  Future  -     Hemoglobin A1c; Future  -     Lipid Panel; Future  -     TSH; Future  -     Vitamin B12 & Folate; Future  -     TSH  -     Hemoglobin A1c  -     T4, Free  -     C-Peptide    Diabetic peripheral neuropathy (CMS/HCC)  -     Basic Metabolic Panel; Future  -     Hemoglobin A1c; Future  -     Lipid Panel; Future  -     TSH; Future  -     Vitamin B12 & Folate; Future    Gastroparesis  -     Basic Metabolic Panel; Future  -     Hemoglobin A1c; Future  -     Lipid Panel; Future  -     TSH; Future  -     Vitamin B12 & Folate; Future    Hyperlipidemia, unspecified hyperlipidemia type  -     Basic Metabolic Panel; Future  -     Hemoglobin A1c; Future  -     Lipid Panel; Future  -     TSH; Future  -     Vitamin B12 & Folate; Future    DUKES (nonalcoholic steatohepatitis)  -     Basic Metabolic Panel; Future  -     Hemoglobin A1c; Future  -     Lipid Panel; Future  -     TSH; Future  -     Vitamin B12 & Folate; Future      Type 2 diabetes mellitus-extremely uncontrolled, complicated with gastroparesis  Increase Lantus to 60 units twice daily  Continue Humalog 18 units with each meal  Continue Humalog sliding scale 3 times daily before meals.  Advised the patient to take the Humalog as long as her blood sugar is greater than 200 even if she is not eating as her blood sugars are severely elevated.  Discussed with the [employer] agent to get the patient started on insulin pump and the sensor with preset settings as that might at least control her blood sugars.    Hypothyroidism  We will check a thyroid panel  Currently not on any thyroid medication, based on today's blood workup will make further decisions on this.    Gastroparesis  Managed by gastroenterology    Reviewed Lab results with the patient.       22   ( greater than 50% of the time) out of  40 minutes face-to-face spent counseling the patient on insulin pump benefits and sensor benefits, changes to the insulin regimen and also discussing with the [employer]  "agent.            Merary Burnett MD  02/14/19    EMR Dragon / transcription disclaimer:     \"Dictated utilizing Dragon dictation\".  "

## 2019-02-14 NOTE — PATIENT INSTRUCTIONS
Lantus 60 units twice daily.   Humalog 18 units with each meal or for BG greater than 200 mg/dl.   Humalog sliding scale with each meal or for BG greater than 200 mg/dl.   BG less than 150 - zero  151 - 200 - 3 unit  201 - 250 - 6 units  251 - 300 - 9 units  301 - 350 - 12 units  Above 350 mg/dl - 15 units.     Checking BG 3 - 4 times a day.     Hold Humalog if BG less than 100 mg/dl.

## 2019-02-14 NOTE — PROGRESS NOTES
Subjective   Silvia Zabala is a 58 y.o.. female is here today for follow-up.    Chief Complaint   Patient presents with   • Liver Follow-up       History of Present Illness  Patient returns for follow-up of her numerous medical issues.  Most of them are chronic, fairly intractable, and unchanged.  These include overwhelming fatigue, persistent nausea, intermittent generalized abdominal pain and issues related to fibromyalgia.  One thing that is new is that she reports some red blood per rectum.  It is been years since her last colonoscopy.    The following portions of the patient's history were reviewed and updated as appropriate: allergies, current medications, past family history, past medical history, past social history, past surgical history and problem list.      Current Outpatient Medications:   •  ALPRAZolam (XANAX) 0.5 MG tablet, Take 1 tablet by mouth 2 (Two) Times a Day As Needed for Anxiety. (Patient taking differently: Take 0.5 mg by mouth Daily.), Disp: 60 tablet, Rfl: 1  •  amitriptyline (ELAVIL) 50 MG tablet, Take 1 tablet by mouth Every Night., Disp: 30 tablet, Rfl: 0  •  cholecalciferol 1000 units tablet, Take 1,000 Units by mouth Daily., Disp: , Rfl:   •  Continuous Blood Gluc Sensor (FREESTYLE YESIKA SENSOR SYSTEM), 1 Device Every 14 (Fourteen) Days., Disp: 2 each, Rfl: 3  •  cyclobenzaprine (FLEXERIL) 5 MG tablet, Take 5 mg by mouth every night at bedtime., Disp: , Rfl:   •  DULoxetine (CYMBALTA) 30 MG capsule, Take 90 mg by mouth Daily., Disp: , Rfl:   •  folic acid (FOLVITE) 1 MG tablet, Take 1 mg by mouth Daily., Disp: , Rfl:   •  furosemide (LASIX) 40 MG tablet, Take 1 tablet by mouth Daily., Disp: 30 tablet, Rfl: 2  •  hydrOXYzine (ATARAX) 25 MG tablet, Take 50 mg by mouth 3 (Three) Times a Day As Needed for Itching., Disp: , Rfl:   •  Insulin Glargine (LANTUS SOLOSTAR) 100 UNIT/ML injection pen, Inject 40 Units under the skin into the appropriate area as directed Every 12 (Twelve) Hours.,  Disp: 10 pen, Rfl: 11  •  insulin lispro (humaLOG) 100 UNIT/ML injection, 18 units TID with meals, plus 1 unit per 40 greater than 150, Disp: 30 mL, Rfl: 11  •  levETIRAcetam (KEPPRA) 500 MG tablet, Take 500 mg by mouth 2 (Two) Times a Day., Disp: , Rfl:   •  Multiple Vitamins-Minerals (MULTIVITAMIN WITH MINERALS) tablet tablet, Take 1 tablet by mouth Daily., Disp: , Rfl:   •  OxyCODONE HCl (OXAYDO) 7.5 MG tablet , Take 7.5 mg by mouth Every 8 (Eight) Hours As Needed., Disp: , Rfl:   •  pantoprazole (PROTONIX) 40 MG EC tablet, TAKE ONE TABLET BY MOUTH DAILY, Disp: 30 tablet, Rfl: 2  •  polyethylene glycol (MIRALAX) powder, Take 17 g by mouth Daily., Disp: , Rfl:   •  pregabalin (LYRICA) 75 MG capsule, Take 75 mg by mouth 2 (Two) Times a Day., Disp: , Rfl:   •  promethazine (PHENERGAN) 25 MG tablet, Take 1 tablet by mouth Every 6 (Six) Hours As Needed for Nausea or Vomiting., Disp: 30 tablet, Rfl: 0  •  spironolactone (ALDACTONE) 100 MG tablet, TAKE ONE TABLET BY MOUTH DAILY, Disp: 30 tablet, Rfl: 2  •  trimethobenzamide (TIGAN) 300 MG capsule, Take 300 mg by mouth 3 (Three) Times a Day., Disp: , Rfl:   •  trimethobenzamide (TIGAN) 300 MG capsule, Every 8 (Eight) Hours., Disp: , Rfl:   •  vitamin B-12 (CYANOCOBALAMIN) 1000 MCG tablet, Take 1,000 mcg by mouth daily., Disp: , Rfl:   •  XIFAXAN 550 MG tablet, TAKE ONE TABLET BY MOUTH TWICE A DAY, Disp: 60 tablet, Rfl: 2  •  ZINC GLUCONATE PO, Take 1 tablet by mouth Daily., Disp: , Rfl:     Family History   Problem Relation Age of Onset   • Liver disease Other    • Depression Other    • Heart disease Other    • Hypertension Other    • Diabetes Other    • Breast cancer Other    • Brain cancer Other    • Uterine cancer Other    • Colon cancer Other    • Leukemia Other    • Colon cancer Maternal Aunt    • Hypertension Mother    • Diabetes type II Mother    • Rheum arthritis Mother    • Liver disease Mother         DUKES   • Heart disease Father    • Diabetes type II Father     • Diabetes type II Sister    • Lupus Sister    • Malig Hyperthermia Neg Hx        Review of Systems   Respiratory: Negative for shortness of breath.    Cardiovascular: Negative for chest pain.   All other systems reviewed and are negative.      Objective   Physical Exam   Constitutional: She appears well-developed and well-nourished.   Patient is chronically ill-appearing and depressed.  She has no gross evidence of encephalopathy and she does not have asterixis.   Abdominal:   Her abdominal examination is unchanged from prior examinations.   Nursing note and vitals reviewed.      Pertinent laboratory results were reviewed. , Pertinent old records were reviewed.  and Pertinent radiology results were reviewed.     Assessment/Plan   Problems Addressed this Visit        Digestive    Cirrhosis of liver (CMS/HCC)    Nausea & vomiting    DUKES (nonalcoholic steatohepatitis)    Hemorrhage of anus and rectum       Nervous and Auditory    Fibrositis       Musculoskeletal and Integument    Acute ITP (CMS/HCC)       Hematopoietic and Hemostatic    Iron deficiency anemia - Primary    Relevant Orders    Case Request (Completed)        As mentioned above, most of her issues are chronic and not readily amenable to therapy.  However, the rectal bleeding is new and it is been quite some time since her last colonoscopy so I would go ahead and update that.  We talked about the risk of the procedure particularly in regard to her splenomegaly.

## 2019-02-14 NOTE — TELEPHONE ENCOUNTER
----- Message from Lisa Parks sent at 2/14/2019  8:44 AM EST -----  Contact: Emily with [facility]  Emily from [facility] calling.  She  is requesting orders for PT, OT and a .  Pt also needs an order for a walker and shower chair sent to [facility].    Emily - # [phone]

## 2019-02-15 PROBLEM — K62.5 HEMORRHAGE OF ANUS AND RECTUM: Status: ACTIVE | Noted: 2019-02-15

## 2019-02-15 LAB
C PEPTIDE SERPL-MCNC: 1.6 NG/ML (ref 1.1–4.4)
HBA1C MFR BLD: 11.1 % (ref 4.8–5.6)
T4 FREE SERPL-MCNC: 1.07 NG/DL (ref 0.82–1.77)
TSH SERPL DL<=0.005 MIU/L-ACNC: 2.48 UIU/ML (ref 0.45–4.5)

## 2019-02-15 NOTE — PROGRESS NOTES
Mail results to pt, no treatment changes at this time. Thyroid levels are normal, continue the changes on insulin as discussed in clinic. A1c is worse since last visit.

## 2019-02-19 ENCOUNTER — TELEPHONE (OUTPATIENT)
Dept: GASTROENTEROLOGY | Facility: CLINIC | Age: 59
End: 2019-02-19

## 2019-02-19 NOTE — TELEPHONE ENCOUNTER
----- Message from Anju Ahumada MA sent at 2/18/2019 10:46 AM EST -----  [id number] Called patient to schedule her colonoscopy. She informed me Dr. Torres of Transplant team is admitting her to [facility] For an EGD and feeding tube.. Told her she is going to die if they do not do this. She will be admitted today. She will keep us informed as to what is going on. She is also going to see if they want to go ahead and do colonoscopy. If not how long they want her to wait to do it.  Made Dr. Coleman aware of this. He is fine if they go ahead and do colonoscopy.

## 2019-03-04 ENCOUNTER — TELEPHONE (OUTPATIENT)
Dept: ONCOLOGY | Facility: CLINIC | Age: 59
End: 2019-03-04

## 2019-03-04 NOTE — TELEPHONE ENCOUNTER
----- Message from Rocío Kelyl sent at 3/4/2019  4:22 PM EST -----  Contact: [phone]  Pt is calling back about her iron infusions.  The infusions were orders by Dr. Rajput when she was in the hospital  Patient was in hospital and had to cancel appointment with Dr Mcdowell last week. Needs to reschedule. In basket message to scheduling. Instructed patient that they would call her.

## 2019-03-05 ENCOUNTER — TELEPHONE (OUTPATIENT)
Dept: INTERNAL MEDICINE | Facility: CLINIC | Age: 59
End: 2019-03-05

## 2019-03-05 NOTE — TELEPHONE ENCOUNTER
----- Message from Sherie Ambriz sent at 3/5/2019 10:16 AM EST -----  Contact: Emily with [facility]  Calling to get resumption of care orders.      Caller# [phone]

## 2019-03-08 NOTE — TELEPHONE ENCOUNTER
Emily [facility] is calling again to get reinstated orders for PT, OT, skilled nursing.  She has just gotten out of the hospital again.  Lang.  Caller#[phone]

## 2019-03-13 NOTE — THERAPY EVALUATION
Acute Care - Physical Therapy Initial Evaluation  [facility]     Patient Name: Silvia Zabala  : 1960  MRN: [id number]  Today's Date: 12/15/2018   Onset of Illness/Injury or Date of Surgery: 18            Admit Date: 2018    Visit Dx:     ICD-10-CM ICD-9-CM   1. Type 2 diabetes mellitus with hyperglycemia, with long-term current use of insulin (CMS/HCC) E11.65 250.00    Z79.4 790.29     V58.67   2. Anasarca R60.1 782.3   3. DUKES (nonalcoholic steatohepatitis) K75.81 571.8   4. Depression, unspecified depression type F32.9 311   5. Closed 2-part displaced fracture of surgical neck of left humerus, initial encounter, non acute S42.222A 812.01     Patient Active Problem List   Diagnosis   • Cirrhosis of liver (CMS/HCC)   • Rheumatoid arthritis (CMS/HCC)   • Anxiety and depression   • Type 2 diabetes mellitus, uncontrolled, with neuropathy (CMS/HCC)   • Fibrositis   • Change in blood platelet count   • Pancytopenia (CMS/HCC)   • Hypersplenism   • Nausea   • DUKES (nonalcoholic steatohepatitis)   • Hyperlipidemia   • Abdominal pain   • Hematemesis   • Ascites   • Portal hypertension (CMS/HCC)   • Systemic lupus (CMS/HCC)   • Secondary esophageal varices without bleeding (CMS/HCC)   • Gastroparesis   • Cirrhosis of liver with ascites (CMS/HCC)   • Diabetic ketoacidosis without coma associated with type 2 diabetes mellitus (CMS/HCC)   • Diabetic peripheral neuropathy (CMS/HCC)   • History of seizure disorder   • Hepatic encephalopathy (CMS/HCC)   • Abnormal finding of blood chemistry    • Thrombocytopenia (CMS/HCC)   • Fever   • Acute ITP (CMS/HCC)   • Degeneration of lumbosacral intervertebral disc   • Seizure (CMS/HCC)   • Spondylosis without myelopathy   • Intractable vomiting with nausea   • UGI bleed   • Migraine without aura   • Urinary retention   • Type 2 diabetes mellitus with hyperglycemia, with long-term current use of insulin (CMS/HCC)   • BRICE (obstructive sleep apnea)   • Gastroparesis  Pt called and LM advsising to call back.    "  • Hyperammonemia (CMS/HCC)   • Hyponatremia   • Anemia     Past Medical History:   Diagnosis Date   • Anemia    • Anxiety    • Arthritis    • Broken shoulder     left shoulder    • Chromosomal abnormality     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   • Cirrhosis (CMS/HCC)    • Colon polyps    • Deafness    • Depression    • Diabetes mellitus (CMS/HCC)     Adult onset, insulin requiring   • Duodenal ulcer with hemorrhage    • Esophageal varices determined by endoscopy (CMS/HCC)    • Fibromyalgia    • Gallbladder disease    • Gastric varices    • Gastroparesis    • Glaucoma    • Granuloma annulare    • H/O B12 deficiency    • H/O Bleeding ulcer     And gastroesophageal varices   • H/O mixed connective tissue disorder    • Hemorrhoids    • Hiatal hernia    • History of transfusion    • Hx of being hospitalized     In [place] for GI bleeding from ulcer and varices   • Hyperlipidemia    • Migraine    • DUKES (nonalcoholic steatohepatitis) 11/2016   • BRICE (obstructive sleep apnea)    • Pancreas divisum    • Pancytopenia (CMS/HCC)     Chronic, due to cirrhosis and hypersplenism   • Peptic ulcer disease     And esophageal varices   • PONV (postoperative nausea and vomiting)    • RA (rheumatoid arthritis) (CMS/HCC)    • Seizure disorder (CMS/HCC)     LAST ONE SEVERAL YEARS AGO   • Seizures (CMS/HCC)    • Systemic lupus (CMS/HCC)      Past Surgical History:   Procedure Laterality Date   • BLADDER REPAIR  1991   • CATARACT EXTRACTION     • CHOLECYSTECTOMY  1994   • ENDOSCOPY AND COLONOSCOPY  2014    Dr. Rich Coleman with findings of candida esophagitis   • EYE SURGERY     • HYSTERECTOMY  1986    partial   • JOINT REPLACEMENT     • KNEE SURGERY Right 1995    \"right knee recontstruction\"   • LIVER BIOPSY  01/2015   • MASS EXCISION     • STOMACH SURGERY          PT ASSESSMENT (last 12 hours)      Physical Therapy Evaluation     Row Name 12/15/18 1542 "          PT Evaluation Time/Intention    Subjective Information  complains of;pain;fatigue;weakness  -AR     Document Type  evaluation  -AR     Mode of Treatment  physical therapy  -AR     Patient Effort  good  -AR     Symptoms Noted During/After Treatment  fatigue  -AR     Row Name 12/15/18 1542          General Information    Patient Profile Reviewed?  yes  -AR     Onset of Illness/Injury or Date of Surgery  12/13/18  -AR     Prior Level of Function  min assist: h/o falls, cane, assist w/ ADLs  -AR     Equipment Currently Used at Home  cane, straight  -AR     Pertinent History of Current Functional Problem  DM2, DUKES, anasarca  -AR     Existing Precautions/Restrictions  fall NWB LUE, sling/swath on  -AR     Barriers to Rehab  none identified  -AR     Row Name 12/15/18 1542          Relationship/Environment    Lives With  spouse;other (see comments) mother in law  -AR     Row Name 12/15/18 1542          Resource/Environmental Concerns    Current Living Arrangements  home/apartment/condo  -AR     Row Name 12/15/18 1542          Home Main Entrance    Number of Stairs, Main Entrance  five  -AR     Stair Railings, Main Entrance  railings on both sides of stairs  -AR     Row Name 12/15/18 1542          Cognitive Assessment/Intervention- PT/OT    Orientation Status (Cognition)  oriented to;person;place  -AR     Safety Deficit (Cognitive)  mild deficit  -AR     Row Name 12/15/18 1542          Bed Mobility Assessment/Treatment    Bed Mobility Assessment/Treatment  supine-sit;sit-supine  -AR     Supine-Sit Brady (Bed Mobility)  minimum assist (75% patient effort)  -AR     Sit-Supine Brady (Bed Mobility)  minimum assist (75% patient effort)  -AR     Assistive Device (Bed Mobility)  bed rails;head of bed elevated  -AR     Row Name 12/15/18 1542          Transfer Assessment/Treatment    Transfer Assessment/Treatment  sit-stand transfer;stand-sit transfer  -AR     Sit-Stand Brady (Transfers)  minimum assist  (75% patient effort)  -AR     Stand-Sit [place] (Transfers)  minimum assist (75% patient effort)  -AR     Row Name 12/15/18 1542          Sit-Stand Transfer    Assistive Device (Sit-Stand Transfers)  -- HHA on R  -AR     Row Name 12/15/18 1542          Gait/Stairs Assessment/Training    [place] Level (Gait)  minimum assist (75% patient effort)  -AR     Assistive Device (Gait)  -- HHA on R  -AR     Distance in Feet (Gait)  145  -AR     Pattern (Gait)  swing-through  -AR     Deviations/Abnormal Patterns (Gait)  gait speed decreased;festinating/shuffling  -AR     Bilateral Gait Deviations  heel strike decreased  -AR     Comment (Gait/Stairs)  several LOB episodes requiring min A to prevent fall  -AR     Row Name 12/15/18 1542          General ROM    GENERAL ROM COMMENTS  B LE WFL  -AR     Row Name 12/15/18 1542          MMT (Manual Muscle Testing)    General MMT Comments  B LE 4/5  -AR     Row Name 12/15/18 1542          Pain Assessment    Additional Documentation  Pain Scale: Numbers Pre/Post-Treatment (Group)  -AR     Row Name 12/15/18 1542          Pain Scale: Numbers Pre/Post-Treatment    Pain Scale: Numbers, Pretreatment  5/10  -AR     Pain Location  head  -AR     Pain Intervention(s)  Medication (See MAR);Repositioned  -AR     Row Name 12/15/18 1542          Physical Therapy Clinical Impression    Patient/Family Goals Statement (PT Clinical Impression)  DC to SNU  -AR     Criteria for Skilled Interventions Met (PT Clinical Impression)  yes  -AR     Pathology/Pathophysiology Noted (Describe Specifically for Each System)  musculoskeletal  -AR     Impairments Found (describe specific impairments)  aerobic capacity/endurance;gait, locomotion, and balance  -AR     Rehab Potential (PT Clinical Summary)  good, to achieve stated therapy goals  -AR     Row Name 12/15/18 1542          Vital Signs    O2 Delivery Pre Treatment  room air  -AR     Row Name 12/15/18 1542          Physical Therapy Goals    Bed Mobility  Goal Selection (PT)  bed mobility, PT goal 1  -AR     Transfer Goal Selection (PT)  transfer, PT goal 1  -AR     Gait Training Goal Selection (PT)  gait training, PT goal 1  -AR     Row Name 12/15/18 1542          Bed Mobility Goal 1 (PT)    Activity/Assistive Device (Bed Mobility Goal 1, PT)  sit to supine/supine to sit  -AR     [place] Level/Cues Needed (Bed Mobility Goal 1, PT)  supervision required  -AR     Time Frame (Bed Mobility Goal 1, PT)  1 week  -AR     Row Name 12/15/18 1542          Transfer Goal 1 (PT)    Activity/Assistive Device (Transfer Goal 1, PT)  sit-to-stand/stand-to-sit;cane, straight  -AR     [place] Level/Cues Needed (Transfer Goal 1, PT)  standby assist  -AR     Time Frame (Transfer Goal 1, PT)  1 week  -AR     Row Name 12/15/18 1542          Gait Training Goal 1 (PT)    Activity/Assistive Device (Gait Training Goal 1, PT)  cane, straight  -AR     [place] Level (Gait Training Goal 1, PT)  standby assist  -AR     Distance (Gait Goal 1, PT)  300  -AR     Time Frame (Gait Training Goal 1, PT)  1 week  -AR     Row Name 12/15/18 1542          Positioning and Restraints    Pre-Treatment Position  in bed  -AR     Post Treatment Position  bed  -AR     In Bed  supine;call light within reach;encouraged to call for assist;exit alarm on  -AR     Row Name 12/15/18 1542          Living Environment    Home Accessibility  stairs to enter home  -AR       User Key  (r) = Recorded By, (t) = Taken By, (c) = Cosigned By    Initials Name Provider Type    Terri Mccann PT Physical Therapist        Physical Therapy Education     Title: PT OT SLP Therapies (In Progress)     Topic: Physical Therapy (In Progress)     Point: Mobility training (In Progress)     Learning Progress Summary           Patient Acceptance, E, NR by AR at 12/15/2018  3:47 PM                   Point: Home exercise program (In Progress)     Learning Progress Summary           Patient Acceptance, E, NR by AR at 12/15/2018   3:47 PM                   Point: Body mechanics (In Progress)     Learning Progress Summary           Patient Acceptance, E, NR by AR at 12/15/2018  3:47 PM                   Point: Precautions (In Progress)     Learning Progress Summary           Patient Acceptance, E, NR by AR at 12/15/2018  3:47 PM                               User Key     Initials Effective Dates Name Provider Type Discipline    AR 04/03/18 -  Terri Garay, PT Physical Therapist PT              PT Recommendation and Plan  Anticipated Discharge Disposition (PT): home with assist, home with home health, skilled nursing facility(pending progress while inpatieint)  Planned Therapy Interventions (PT Eval): balance training, bed mobility training, home exercise program, gait training, patient/family education, ROM (range of motion), stair training, strengthening, transfer training  Therapy Frequency (PT Clinical Impression): 5 times/wk  Outcome Summary/Treatment Plan (PT)  Anticipated Discharge Disposition (PT): home with assist, home with home health, skilled nursing facility(pending progress while inpatieint)  Plan of Care Reviewed With: patient  Outcome Summary: Pt admitted 12/13 with +fall, DM2, DUKES, and anasarca.  Pt reports h/o falls with recent L humerus fx, currently NWB LUE in sling/swath.  Today pt ambulated 145' w/ min A and HHAx1; several loss of balance episodes requiring min A to prevent fall.  Pt demonstrates generalized weakness, impaired standing balance, and gait pattern.  Pt interested in DC to SNU -may benefit from SNU due to balance/strength deficits.       Time Calculation:   PT Charges     Row Name 12/15/18 1550             Time Calculation    Start Time  1526  -AR      Stop Time  1550  -AR      Time Calculation (min)  24 min  -AR      PT Received On  12/15/18  -AR      PT - Next Appointment  12/17/18  -AR      PT Goal Re-Cert Due Date  12/24/18  -AR        User Key  (r) = Recorded By, (t) = Taken By, (c) = Cosigned By     Initials Name Provider Type    AR Terri Garay, PT Physical Therapist        Therapy Suggested Charges     Code   Minutes Charges    None           Therapy Charges for Today     Code Description Service Date Service Provider Modifiers Qty    87524353132 HC PT EVAL MOD COMPLEXITY 2 12/15/2018 Terri Garay, PT GP 1    86107488270 HC PT THER PROC EA 15 MIN 12/15/2018 Terri Garay, PT GP 1          PT G-Codes  AM-PAC 6 Clicks Score: 24      Terri Garay PT  12/15/2018

## 2019-03-20 ENCOUNTER — APPOINTMENT (OUTPATIENT)
Dept: OTHER | Facility: HOSPITAL | Age: 59
End: 2019-03-20

## 2019-03-20 ENCOUNTER — APPOINTMENT (OUTPATIENT)
Dept: ONCOLOGY | Facility: CLINIC | Age: 59
End: 2019-03-20

## 2019-03-26 ENCOUNTER — APPOINTMENT (OUTPATIENT)
Dept: ONCOLOGY | Facility: CLINIC | Age: 59
End: 2019-03-26

## 2019-03-26 ENCOUNTER — LAB (OUTPATIENT)
Dept: OTHER | Facility: HOSPITAL | Age: 59
End: 2019-03-26

## 2019-03-26 ENCOUNTER — TELEPHONE (OUTPATIENT)
Dept: GASTROENTEROLOGY | Facility: CLINIC | Age: 59
End: 2019-03-26

## 2019-03-26 ENCOUNTER — APPOINTMENT (OUTPATIENT)
Dept: OTHER | Facility: HOSPITAL | Age: 59
End: 2019-03-26

## 2019-03-26 ENCOUNTER — OFFICE VISIT (OUTPATIENT)
Dept: ONCOLOGY | Facility: CLINIC | Age: 59
End: 2019-03-26

## 2019-03-26 VITALS
WEIGHT: 191.7 LBS | HEART RATE: 101 BPM | SYSTOLIC BLOOD PRESSURE: 154 MMHG | TEMPERATURE: 98.6 F | BODY MASS INDEX: 29.05 KG/M2 | OXYGEN SATURATION: 99 % | RESPIRATION RATE: 20 BRPM | DIASTOLIC BLOOD PRESSURE: 72 MMHG | HEIGHT: 68 IN

## 2019-03-26 DIAGNOSIS — D73.1 HYPERSPLENISM: ICD-10-CM

## 2019-03-26 DIAGNOSIS — IMO0001 ADVERSE EFFECT OF IRON OR ITS COMPOUND, SUBSEQUENT ENCOUNTER: ICD-10-CM

## 2019-03-26 DIAGNOSIS — I85.10 SECONDARY ESOPHAGEAL VARICES WITHOUT BLEEDING (HCC): ICD-10-CM

## 2019-03-26 DIAGNOSIS — K74.60 CIRRHOSIS OF LIVER WITHOUT ASCITES, UNSPECIFIED HEPATIC CIRRHOSIS TYPE (HCC): ICD-10-CM

## 2019-03-26 DIAGNOSIS — D61.818 PANCYTOPENIA (HCC): ICD-10-CM

## 2019-03-26 DIAGNOSIS — D69.6 THROMBOCYTOPENIA (HCC): ICD-10-CM

## 2019-03-26 DIAGNOSIS — D50.9 IRON DEFICIENCY ANEMIA, UNSPECIFIED IRON DEFICIENCY ANEMIA TYPE: ICD-10-CM

## 2019-03-26 DIAGNOSIS — R18.8 CIRRHOSIS OF LIVER WITH ASCITES, UNSPECIFIED HEPATIC CIRRHOSIS TYPE (HCC): ICD-10-CM

## 2019-03-26 DIAGNOSIS — K74.69 OTHER CIRRHOSIS OF LIVER (HCC): ICD-10-CM

## 2019-03-26 DIAGNOSIS — D69.3 ACUTE ITP (HCC): ICD-10-CM

## 2019-03-26 DIAGNOSIS — K76.6 PORTAL HYPERTENSION (HCC): Primary | ICD-10-CM

## 2019-03-26 DIAGNOSIS — K74.60 CIRRHOSIS OF LIVER WITH ASCITES, UNSPECIFIED HEPATIC CIRRHOSIS TYPE (HCC): ICD-10-CM

## 2019-03-26 LAB
BASOPHILS # BLD AUTO: 0.03 10*3/MM3 (ref 0–0.2)
BASOPHILS NFR BLD AUTO: 1 % (ref 0–1.5)
DEPRECATED RDW RBC AUTO: 59.4 FL (ref 37–54)
EOSINOPHIL # BLD AUTO: 0.06 10*3/MM3 (ref 0–0.4)
EOSINOPHIL NFR BLD AUTO: 2 % (ref 0.3–6.2)
ERYTHROCYTE [DISTWIDTH] IN BLOOD BY AUTOMATED COUNT: 17.9 % (ref 12.3–15.4)
FERRITIN SERPL-MCNC: 347.7 NG/ML (ref 13–150)
HCT VFR BLD AUTO: 32.8 % (ref 34–46.6)
HGB BLD-MCNC: 11 G/DL (ref 12–15.9)
IMM GRANULOCYTES # BLD AUTO: 0.11 10*3/MM3 (ref 0–0.05)
IMM GRANULOCYTES NFR BLD AUTO: 3.7 % (ref 0–0.5)
IRON 24H UR-MRATE: 69 MCG/DL (ref 37–145)
IRON SATN MFR SERPL: 17 % (ref 20–50)
LYMPHOCYTES # BLD AUTO: 0.48 10*3/MM3 (ref 0.7–3.1)
LYMPHOCYTES NFR BLD AUTO: 16.1 % (ref 19.6–45.3)
MCH RBC QN AUTO: 30.3 PG (ref 26.6–33)
MCHC RBC AUTO-ENTMCNC: 33.5 G/DL (ref 31.5–35.7)
MCV RBC AUTO: 90.4 FL (ref 79–97)
MONOCYTES # BLD AUTO: 0.35 10*3/MM3 (ref 0.1–0.9)
MONOCYTES NFR BLD AUTO: 11.7 % (ref 5–12)
NEUTROPHILS # BLD AUTO: 1.96 10*3/MM3 (ref 1.4–7)
NEUTROPHILS NFR BLD AUTO: 65.5 % (ref 42.7–76)
NRBC BLD AUTO-RTO: 0 /100 WBC (ref 0–0)
PLATELET # BLD AUTO: 69 10*3/MM3 (ref 140–450)
PMV BLD AUTO: 11 FL (ref 6–12)
RBC # BLD AUTO: 3.63 10*6/MM3 (ref 3.77–5.28)
TIBC SERPL-MCNC: 399 MCG/DL (ref 298–536)
TRANSFERRIN SERPL-MCNC: 268 MG/DL (ref 200–360)
WBC NRBC COR # BLD: 2.99 10*3/MM3 (ref 3.4–10.8)

## 2019-03-26 PROCEDURE — 85025 COMPLETE CBC W/AUTO DIFF WBC: CPT | Performed by: INTERNAL MEDICINE

## 2019-03-26 PROCEDURE — 83540 ASSAY OF IRON: CPT | Performed by: INTERNAL MEDICINE

## 2019-03-26 PROCEDURE — 82728 ASSAY OF FERRITIN: CPT | Performed by: INTERNAL MEDICINE

## 2019-03-26 PROCEDURE — 36415 COLL VENOUS BLD VENIPUNCTURE: CPT

## 2019-03-26 PROCEDURE — 84466 ASSAY OF TRANSFERRIN: CPT | Performed by: INTERNAL MEDICINE

## 2019-03-26 PROCEDURE — 99215 OFFICE O/P EST HI 40 MIN: CPT | Performed by: INTERNAL MEDICINE

## 2019-03-26 NOTE — TELEPHONE ENCOUNTER
Patient returned my call canceled appointment for today as Dr. Coleman will be running late. Do to an emergency add on surgery today

## 2019-03-26 NOTE — PROGRESS NOTES
REASON FOR FOLLOWUP:    1. 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   2. Evidence of B12 deficiency on her initial lab evaluation in April 2013.  Patient was started on parenteral B12 replacement.  The patient was switched from shots to daily oral B12 replacement after the visit of 12/02/2013.    3. Hemoglobin has significantly improved since banding of her esophageal varices and increase in her oral iron supplement to twice daily dosing.  4. Morphologically normal bone marrow from 08/20/2013 with normal flow cytometry.  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  Significance of this is uncertain.    5. Mixed connective tissue disorder, currently on Enbrel therapy.  6. Insulin-requiring diabetes mellitus.  7. Patient continues to follow-up periodically with the transplant team at [facility] but currently she is not on the transplant list.  8. 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 Rituxan therapy weekly ×4 completed 7/16/2018     HISTORY OF PRESENT ILLNESS:  Silvia is a 58 y.o. 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  She has had GI bleeding in the past due to portal hypertension.      She comes in today for follow-up on her ITP receiving Rituxan weekly ×4.  She completed her Rituxan treatment on 7/16/2018.  She is here today for follow-up and seems to be doing reasonably well with a platelet count today of 69,000 which is essentially back to her baseline.    She also saved 2 doses of IV iron therapy in the form of Injectafer 750 mg each dose delivered on 1/29/2019 and 2/13/2019.  Her repeat iron studies are pending today but her hemoglobin is also at her baseline at 11 g/dL.    We reviewed records from her recent hospitalizations including the hospitalization at [facility].  She reports that she is  "on the transplant watch list but not on the transplant waiting list at this point.    She is uncomfortable today due to tense ascites.  We will be scheduling an outpatient ultrasound paracentesis for comfort    Past Medical History, Past Surgical History, Social History, Family History have been reviewed and are without significant changes except as mentioned.    Review of Systems   Review of Systems   Constitutional: Positive for fatigue and fever. Negative for activity change.   HENT: Negative for mouth sores, nosebleeds and trouble swallowing.    Respiratory: Negative for cough, shortness of breath and wheezing.    Cardiovascular: Negative for chest pain and palpitations.   Gastrointestinal: Positive for constipation. Negative for diarrhea and nausea.   Genitourinary: Negative for dysuria and hematuria.   Musculoskeletal: Negative for arthralgias and myalgias.   Skin: Negative for rash and wound.   Neurological: Negative for seizures and syncope.   Hematological: Negative for adenopathy. Bruises/bleeds easily.   Psychiatric/Behavioral: Negative for confusion.       Medications:  The current medication list was reviewed in the EMR    ALLERGIES:    Allergies   Allergen Reactions   • Albuterol Anaphylaxis   • Tramadol Nausea Only, Other (See Comments) and GI Intolerance     Per pt causes her \"palsy, meaning shaking and tremors\"    • Lactulose Nausea And Vomiting and Other (See Comments)     Severe abdominal pain   • Quinine Derivatives Nausea And Vomiting              Vitals:    03/26/19 1106   BP: 154/72   Pulse: 101   Resp: 20   Temp: 98.6 °F (37 °C)   SpO2: 99%   Weight: 87 kg (191 lb 11.2 oz)   Height: 171.5 cm (67.52\")   PainSc:   8   PainLoc: Comment: all over, mainly spleen area     Physical Exam    CONSTITUTIONAL:  Vital signs reviewed.  No distress, looks comfortable.  EYES:  Conjunctiva and lids unremarkable.  PERRLA  EARS,NOSE,MOUTH,THROAT:  Ears and nose appear unremarkable.  Lips, teeth, gums appear " unremarkable.  RESPIRATORY:  Normal respiratory effort.  Lungs clear to auscultation bilaterally.  CARDIOVASCULAR:  Normal S1, S2.  No murmurs rubs or gallops.  No significant lower extremity edema.  GASTROINTESTINAL: Abdomen appears unremarkable.  Nontender.  No hepatomegaly.  No splenomegaly.  MUSCULOSKELETAL:  Unremarkable digits/nails.  No cyanosis or clubbing.  SKIN:  Warm.  No rashes.  PSYCHIATRIC:  Normal judgment and insight.  Normal mood and affect.       RECENT LABS:  No results found for: WBC, HGB, PLT  Lab Results   Component Value Date    NEUTROABS 3.18 02/13/2019                 ASSESSMENT/PLAN:     *ITP.  Appears to be most consistent with ITP.  Peripheral smear showed no significant amount of schistocytes and no platelet clumping.  IPF improved to 5.3% after Rituxan.  She received 5 daily doses of IVIG in the hospital and was discharged on prednisone 60 mg daily.  Prednisone was decreased to 10 mg daily her last office visit and Plan to continue her on this dose until she follows up with her rheumatologist.  Her platelets now seem to be back at her baseline in the 50,000 100,000 range from hypersplenism.    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  WBC has declined to 2.9 today.  Platelet count is stable at 69,000.   *B12 deficiency.  On oral B12 since December 2013.     *History of esophageal varices banding.     *Iron deficiency anemia due to GI bleeding.  Has been on oral iron twice per day.  Ferritin 51 with 10% saturation in the hospital.  She received 2 doses of IV Venofer prior to discharge.  She recently completed 2 doses of IV Injectafer as an outpatient 750 mg each delivered on 1/29/19 and 2/13/19.    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  This is of unknown significance.     *Rheumatoid arthritis.   Reports she hasn't been unable to take Enbrel since December due to persistent fevers.     *Lupus     *Diabetes.  Hyperglycemia from steroids may be a problem.  Defer to hospitalist.     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  She currently is having some issues with large volume ascites.  She remains on a transplant watch list.        Plan  · We reviewed the blood counts from today with the patient and her family.  Her hemoglobin white cells and platelets are all slightly lower but essentially at her baseline.  She also may be having some delusional cytopenias due to her ascites and fluid retention.  · Schedule an outpatient ultrasound-guided paracentesis for comfort with plans to send the fluid for Gram stain and culture.  · She will be scheduled to return to our office for CBC with RN review monthly over the next 2 months.  · MD follow-up with repeat labs in 3 months.  With that visit she will have repeat iron studies performed.

## 2019-04-01 ENCOUNTER — HOSPITAL ENCOUNTER (OUTPATIENT)
Dept: ULTRASOUND IMAGING | Facility: HOSPITAL | Age: 59
Discharge: HOME | End: 2019-04-01
Admitting: RADIOLOGY

## 2019-04-01 VITALS
HEIGHT: 67 IN | OXYGEN SATURATION: 97 % | WEIGHT: 191 LBS | SYSTOLIC BLOOD PRESSURE: 142 MMHG | HEART RATE: 101 BPM | TEMPERATURE: 97.8 F | BODY MASS INDEX: 29.98 KG/M2 | DIASTOLIC BLOOD PRESSURE: 78 MMHG | RESPIRATION RATE: 16 BRPM

## 2019-04-01 DIAGNOSIS — D69.6 THROMBOCYTOPENIA (HCC): ICD-10-CM

## 2019-04-01 DIAGNOSIS — K76.6 PORTAL HYPERTENSION (HCC): ICD-10-CM

## 2019-04-01 DIAGNOSIS — D50.9 IRON DEFICIENCY ANEMIA, UNSPECIFIED IRON DEFICIENCY ANEMIA TYPE: ICD-10-CM

## 2019-04-01 DIAGNOSIS — K74.69 OTHER CIRRHOSIS OF LIVER (HCC): ICD-10-CM

## 2019-04-01 DIAGNOSIS — R18.8 CIRRHOSIS OF LIVER WITH ASCITES, UNSPECIFIED HEPATIC CIRRHOSIS TYPE (HCC): ICD-10-CM

## 2019-04-01 DIAGNOSIS — I85.10 SECONDARY ESOPHAGEAL VARICES WITHOUT BLEEDING (HCC): ICD-10-CM

## 2019-04-01 DIAGNOSIS — K74.60 CIRRHOSIS OF LIVER WITH ASCITES, UNSPECIFIED HEPATIC CIRRHOSIS TYPE (HCC): ICD-10-CM

## 2019-04-01 LAB
INR PPP: 1.1 (ref 0.8–1.2)
PROTHROMBIN TIME: 13.1 SECONDS (ref 12.8–15.2)

## 2019-04-01 PROCEDURE — 85610 PROTHROMBIN TIME: CPT

## 2019-04-01 PROCEDURE — 76942 ECHO GUIDE FOR BIOPSY: CPT

## 2019-04-01 NOTE — NURSING NOTE
Patient had no fluid to drain off abdomen. Ambulated with walker and sister out to main entrance. No distress noted.

## 2019-04-01 NOTE — DISCHARGE INSTRUCTIONS
EDUCATION /DISCHARGE INSTRUCTIONS  Paracentesis:  A needle is inserted into the space between your abdominal organs and the membrane that surrounds them (peritoneal space).  It is done for the diagnosis and treatment of fluid that is resistant to other therapies.  It helps determine the cause of the fluid and at the relieves pressure created by the fluid.  A sample is obtained and sent to the laboratory for study.    During the procedure:  You will lie on a bed on your back with your legs drawn up.  Your abdomen will be exposed from the chest to the pelvis.  You will otherwise be covered to maintain comfort.  A physician will clean your abdomen with antiseptic soap, place a sterile towel around the site and administer a local anesthetic to numb the area.  The physician will insert a needle into your abdominal wall.  There may be a popping sound which signifies the needle has pierced the abdominal wall. Next, the physician will attach tubing to transfer a sample into a collection bottle.  After the fluid is obtained the needle will be removed.  A pressure dressing is applied to the site.    Risks of the procedure include but are not limited to:   *  Bleeding    *  Wound infection   *  Low blood pressure   *  Decreased urination   *  Low sodium if a large amount of fluid is removed   *  Puncture of abdominal organs by the needle    Benefits of the procedure:  Benefits include the removal of fluid from the abdomen, relief of abdominal pressure and facilitation of a diagnosis.    Alternatives to the procedure:  Possible alternatives are diuretic drug therapy or surgery to place a shunt to drain fluid.  Risks of diuretic drug therapy include possible dehydration and renal failure.  The benefit of drug therapy is that it can be done at home under physician supervision.  Risks of shunt placement include exposure to anesthesia, infection, excessive bleeding and injury to abdominal organs.  The benefit of a shunt is that it  can be used to drain fluid over a longer period of time.  THIS EDUCATION INFORMATION WAS REVIEWED PRIOR TO THE PROCEDURE AND CONSENT. Patient initials__________________Time_________________    Post procedure:    *  Weigh yourself daily.   *  Follow your doctors dietary instructions.   *  Rest today (no pushing pulling, straining or heavy lifting).   *  Slowly increase activity tomorow.   *  If you received sedation do not drive for 24 hours.              * Skin affix applied to puncture site. Do not try to remove, scratch or apply lotion   * Skin affix will fall off on it's own   *  You may shower tomorrow    Call your doctor if experiencing:   *  Signs of infection such as redness, swelling, excessive pain and / or foul       smelling drainage from the puncture site.   *  Chills or fever over 101 degrees (by mouth).   *  Fainting.   *  Rapid weight gain / loss.   *  Unrelieved pain.   *  Any new or severe symptoms.    Following the procedure:      Follow-up with the ordering physician as directed.   Continue to take other medications as directed by your physician unless    otherwise instructed.   If applicable, resume taking your blood thinners or Aspirin in 24 hours.    If you have any concerns please call the Radiology Nurses Desk at [phone].  You are the most important factor in your recovery.  Follow the above instructions carefully.

## 2019-04-05 RX ORDER — CYCLOBENZAPRINE HCL 5 MG
TABLET ORAL
Qty: 30 TABLET | Refills: 5 | Status: SHIPPED | OUTPATIENT
Start: 2019-04-05 | End: 2020-12-10

## 2019-04-10 ENCOUNTER — TELEPHONE (OUTPATIENT)
Dept: INTERNAL MEDICINE | Facility: CLINIC | Age: 59
End: 2019-04-10

## 2019-04-10 DIAGNOSIS — N90.89 LABIAL LESION: ICD-10-CM

## 2019-04-10 DIAGNOSIS — F32.A DEPRESSION, UNSPECIFIED DEPRESSION TYPE: ICD-10-CM

## 2019-04-10 DIAGNOSIS — G43.009 MIGRAINE WITHOUT AURA AND WITHOUT STATUS MIGRAINOSUS, NOT INTRACTABLE: Primary | ICD-10-CM

## 2019-04-10 NOTE — TELEPHONE ENCOUNTER
"----- Message from Ene Fernandez sent at 4/10/2019  2:12 PM EDT -----  Contact: Patient   Patient called in stating she was in-patient at [facility] in January of this year. When she was discharged they advised the patient to contact her PCP to request 3 referrals. A neurology consult for severe migraines, A gynecology consult for a knot on her labia, and a psychology consult for suicidal thoughts. She stated she will not \"go through\" with suicide because of her family but she still has thoughts of it. I informed her I would send the message to see if  would like to see her in the office or if she would place the orders without seeing her. Her call back [phone]. Please advise. Thank you.   "

## 2019-04-11 ENCOUNTER — TELEPHONE (OUTPATIENT)
Dept: OBSTETRICS AND GYNECOLOGY | Age: 59
End: 2019-04-11

## 2019-04-11 NOTE — TELEPHONE ENCOUNTER
Referral received for [facility] pt needing to est care for labial lesion.    [facility]:  [facility]    Referred by:  Blanca Pitts    Requesting Dr Gregorio    Looks like there had been a previous referral yet it was a year ago, just fyi.    Sending to [place] for Dr Gregorio to review. Please advise.

## 2019-04-15 NOTE — TELEPHONE ENCOUNTER
Dr Salas, it appears that you accepted this pt last year yet pt cancelled three times after we had to reschedule her once. We received a second referral but it requests Dr Gregorio. Pt is needing to est care for a labial lesion. Should I reschedule pt with you as she didn't no show? However, I felt I should get your approcal again. Please advise.    INS:  Von MANCIA/BRIDGETTE    Referred by Blanca Pitts

## 2019-04-19 ENCOUNTER — RESULTS ENCOUNTER (OUTPATIENT)
Dept: ONCOLOGY | Facility: CLINIC | Age: 59
End: 2019-04-19

## 2019-04-19 DIAGNOSIS — D50.9 IRON DEFICIENCY ANEMIA, UNSPECIFIED IRON DEFICIENCY ANEMIA TYPE: ICD-10-CM

## 2019-04-19 DIAGNOSIS — D69.6 THROMBOCYTOPENIA (HCC): ICD-10-CM

## 2019-04-19 DIAGNOSIS — K74.69 OTHER CIRRHOSIS OF LIVER (HCC): ICD-10-CM

## 2019-04-19 DIAGNOSIS — K74.60 CIRRHOSIS OF LIVER WITH ASCITES, UNSPECIFIED HEPATIC CIRRHOSIS TYPE (HCC): ICD-10-CM

## 2019-04-19 DIAGNOSIS — R18.8 CIRRHOSIS OF LIVER WITH ASCITES, UNSPECIFIED HEPATIC CIRRHOSIS TYPE (HCC): ICD-10-CM

## 2019-04-19 DIAGNOSIS — K76.6 PORTAL HYPERTENSION (HCC): ICD-10-CM

## 2019-04-19 DIAGNOSIS — I85.10 SECONDARY ESOPHAGEAL VARICES WITHOUT BLEEDING (HCC): ICD-10-CM

## 2019-04-22 ENCOUNTER — OFFICE VISIT (OUTPATIENT)
Dept: ORTHOPEDIC SURGERY | Facility: CLINIC | Age: 59
End: 2019-04-22

## 2019-04-22 VITALS — WEIGHT: 199.4 LBS | TEMPERATURE: 97.7 F | HEIGHT: 67 IN | BODY MASS INDEX: 31.3 KG/M2

## 2019-04-22 DIAGNOSIS — S42.292S OTHER CLOSED DISPLACED FRACTURE OF PROXIMAL END OF LEFT HUMERUS, SEQUELA: ICD-10-CM

## 2019-04-22 DIAGNOSIS — M25.512 CHRONIC LEFT SHOULDER PAIN: Primary | ICD-10-CM

## 2019-04-22 DIAGNOSIS — G89.29 CHRONIC LEFT SHOULDER PAIN: Primary | ICD-10-CM

## 2019-04-22 PROCEDURE — 99214 OFFICE O/P EST MOD 30 MIN: CPT | Performed by: ORTHOPAEDIC SURGERY

## 2019-04-22 PROCEDURE — 20610 DRAIN/INJ JOINT/BURSA W/O US: CPT | Performed by: ORTHOPAEDIC SURGERY

## 2019-04-22 RX ADMIN — LIDOCAINE HYDROCHLORIDE 4 ML: 10 INJECTION, SOLUTION EPIDURAL; INFILTRATION; INTRACAUDAL; PERINEURAL at 16:17

## 2019-04-22 RX ADMIN — METHYLPREDNISOLONE ACETATE 80 MG: 80 INJECTION, SUSPENSION INTRA-ARTICULAR; INTRALESIONAL; INTRAMUSCULAR; SOFT TISSUE at 16:17

## 2019-04-22 NOTE — PROGRESS NOTES
"   New Left Shoulder      Patient: Silvia Zabala        YOB: 1960    Medical Record Number: [id number]        Chief Complaints: left shoulder pain      History of Present Illness: This is a 58-year-old female presents complaining of left shoulder pain she is right-hand dominant she had bilateral shoulder pain in the past she did have a fall in October she was told she had a proximal humerus fracture in October but it does not look like she is ever seen orthopedics right one is doing okay left one is bothering her symptoms are moderate intermittent mostly with activities stabbing worse with sitting and reaching better with heat she is disabled past medical history smart for diabetes lupus anemia rheumatoid arthritis liver disease stomach ulcers depression anxiety      Allergies:   Allergies   Allergen Reactions   • Albuterol Anaphylaxis   • Tramadol Nausea Only, Other (See Comments) and GI Intolerance     Per pt causes her \"palsy, meaning shaking and tremors\"    • Lactulose Nausea And Vomiting and Other (See Comments)     Severe abdominal pain   • Quinine Derivatives Nausea And Vomiting       Medications:   Home Medications:  Current Outpatient Medications on File Prior to Visit   Medication Sig   • ALPRAZolam (XANAX) 0.5 MG tablet Take 1 tablet by mouth 2 (Two) Times a Day As Needed for Anxiety. (Patient taking differently: Take 0.5 mg by mouth Daily.)   • amitriptyline (ELAVIL) 50 MG tablet Take 1 tablet by mouth Every Night.   • cholecalciferol 1000 units tablet Take 1,000 Units by mouth Daily.   • Continuous Blood Gluc Sensor (TapZilla YESIKA SENSOR SYSTEM) 1 Device Every 14 (Fourteen) Days.   • cyclobenzaprine (FLEXERIL) 5 MG tablet TAKE ONE TABLET BY MOUTH EVERY NIGHT AT BEDTIME   • DULoxetine (CYMBALTA) 30 MG capsule Take 90 mg by mouth Daily.   • folic acid (FOLVITE) 1 MG tablet Take 1 mg by mouth Daily.   • furosemide (LASIX) 40 MG tablet Take 1 tablet by mouth Daily.   • hydrOXYzine " (ATARAX) 25 MG tablet Take 50 mg by mouth 3 (Three) Times a Day As Needed for Itching.   • insulin lispro (humaLOG) 100 UNIT/ML injection 18 units TID with meals, plus 1 unit per 40 greater than 150 (Patient taking differently: 35 Units. 35 units TID with meals, plus 1 unit per 40 greater than 150 SLIDING SCALE)   • levETIRAcetam (KEPPRA) 500 MG tablet Take 500 mg by mouth 2 (Two) Times a Day.   • Multiple Vitamins-Minerals (MULTIVITAMIN WITH MINERALS) tablet tablet Take 1 tablet by mouth Daily.   • OxyCODONE HCl (OXAYDO) 7.5 MG tablet  Take 7.5 mg by mouth Every 8 (Eight) Hours As Needed.   • pantoprazole (PROTONIX) 40 MG EC tablet TAKE ONE TABLET BY MOUTH DAILY   • polyethylene glycol (MIRALAX) powder Take 17 g by mouth Daily.   • pregabalin (LYRICA) 75 MG capsule Take 75 mg by mouth 2 (Two) Times a Day.   • promethazine (PHENERGAN) 25 MG tablet Take 1 tablet by mouth Every 6 (Six) Hours As Needed for Nausea or Vomiting.   • spironolactone (ALDACTONE) 100 MG tablet TAKE ONE TABLET BY MOUTH DAILY   • vitamin B-12 (CYANOCOBALAMIN) 1000 MCG tablet Take 1,000 mcg by mouth daily.   • XIFAXAN 550 MG tablet TAKE ONE TABLET BY MOUTH TWICE A DAY   • ZINC GLUCONATE PO Take 1 tablet by mouth Daily.   • [DISCONTINUED] Insulin Glargine (LANTUS SOLOSTAR) 100 UNIT/ML injection pen Inject 40 Units under the skin into the appropriate area as directed Every 12 (Twelve) Hours. (Patient taking differently: Inject 70 Units under the skin into the appropriate area as directed Every 12 (Twelve) Hours.)     No current facility-administered medications on file prior to visit.      Current Medications:  Scheduled Meds:  Continuous Infusions:  No current facility-administered medications for this visit.   PRN Meds:.    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   • Cirrhosis (CMS/HCC)    • Colon polyps    • Deafness    • Depression    • Diabetes mellitus (CMS/HCC)     Adult onset, insulin requiring   • Duodenal ulcer with hemorrhage    • Esophageal varices determined by endoscopy (CMS/HCC)    • Fibromyalgia    • Gallbladder disease    • Gastric varices    • Gastroparesis    • Glaucoma    • Granuloma annulare    • H/O B12 deficiency    • H/O Bleeding ulcer     And gastroesophageal varices   • H/O mixed connective tissue disorder    • Hemorrhoids    • Hiatal hernia    • History of transfusion    • Hx of being hospitalized     In [place] for GI bleeding from ulcer and varices   • Hyperlipidemia    • Migraine    • DUKES (nonalcoholic steatohepatitis) 11/2016   • Orthostatic hypotension 02/2019   • BRICE (obstructive sleep apnea)    • Pancreas divisum    • Pancytopenia (CMS/HCC)     Chronic, due to cirrhosis and hypersplenism   • Peptic ulcer disease     And esophageal varices   • PONV (postoperative nausea and vomiting)    • RA (rheumatoid arthritis) (CMS/HCC)    • Seizure disorder (CMS/HCC)     LAST ONE SEVERAL YEARS AGO   • Seizures (CMS/HCC)    • Systemic lupus (CMS/HCC)         Past Surgical History:   Procedure Laterality Date   • BLADDER REPAIR  1991   • CATARACT EXTRACTION     • CHOLECYSTECTOMY  1994   • ENDOSCOPY N/A 11/7/2016    Procedure: ESOPHAGOGASTRODUODENOSCOPY WITH COLD POLYPECTOMY, BANDING,  AND CLIPS TIMES 2;  Surgeon: Rich Coleman MD;  Location: [facility] ENDOSCOPY;  Service:    • ENDOSCOPY N/A 12/22/2016    Procedure: ESOPHAGOGASTRODUODENOSCOPY WITH ESOPHAGEAL BANDING. 5 BANDS FIRED, 4 BANDS ADHERERD TO MUCOSA;  Surgeon: Rich Coleman MD;  Location: [facility] ENDOSCOPY;  Service:    • ENDOSCOPY N/A 2/15/2017    Procedure: ESOPHAGOGASTRODUODENOSCOPY AT BEDSIDE with esophageal varices banding (3);  Surgeon: Rich Coleman MD;  Location: [facility] ENDOSCOPY;  Service:    • ENDOSCOPY N/A 4/6/2017    Procedure:  "ESOPHAGOGASTRODUODENOSCOPY WITH ESOPHAGEAL VARICES BANDING x2;  Surgeon: Rich Coleman MD;  Location: [facility] ENDOSCOPY;  Service:    • ENDOSCOPY N/A 11/24/2017    Procedure: ESOPHAGOGASTRODUODENOSCOPY with biopsies;  Surgeon: Rich Coleman MD;  Location: [facility] ENDOSCOPY;  Service:    • ENDOSCOPY N/A 10/5/2018    Procedure: ESOPHAGOGASTRODUODENOSCOPY;  Surgeon: Rich Coleman MD;  Location: [facility] ENDOSCOPY;  Service: Gastroenterology   • ENDOSCOPY AND COLONOSCOPY  2014    Dr. Rich Coleman with findings of candida esophagitis   • EYE SURGERY     • HYSTERECTOMY  1986    partial   • JOINT REPLACEMENT     • KNEE SURGERY Right 1995    \"right knee recontstruction\"   • LIVER BIOPSY  01/2015   • MASS EXCISION     • STOMACH SURGERY          Social History     Occupational History     Employer: DISABLED   Tobacco Use   • Smoking status: Former Smoker     Packs/day: 0.00     Years: 0.00     Pack years: 0.00     Last attempt to quit: 12/17/2015     Years since quitting: 3.3   • Smokeless tobacco: Never Used   Substance and Sexual Activity   • Alcohol use: No   • Drug use: No   • Sexual activity: Defer      Social History     Social History Narrative    Moved to [place] from [place]. She is currently medically disabled.        Family History   Problem Relation Age of Onset   • Liver disease Other    • Depression Other    • Heart disease Other    • Hypertension Other    • Diabetes Other    • Breast cancer Other    • Brain cancer Other    • Uterine cancer Other    • Colon cancer Other    • Leukemia Other    • Colon cancer Maternal Aunt    • Hypertension Mother    • Diabetes type II Mother    • Rheum arthritis Mother    • Liver disease Mother         DUKES   • Heart disease Father    • Diabetes type II Father    • Diabetes type II Sister    • Lupus Sister    • Malig Hyperthermia Neg Hx              Review of Systems: 14 point review of systems are remarkable for multiple pertinent positives listed in the chart by the " "patient the remainder negative    Review of Systems      Physical Exam: 58 y.o. female  General Appearance:    Alert, cooperative, in no acute distress                   Vitals:    04/22/19 1538   Temp: 97.7 °F (36.5 °C)   TempSrc: Temporal   Weight: 90.4 kg (199 lb 6.4 oz)   Height: 168.9 cm (66.5\")      Patient is alert and read ×3 no acute distress appears her above-listed at height weight and age.  Affect is normal respiratory rate is normal unlabored. Heart rate regular rate rhythm, sclera, dentition and hearing are normal for the purpose of this exam.    Ortho Exam his exam the left shoulder she can actively flex to about 130 passively get her to 150 with pain external rotation is to 35 internal rotation to her lower lumbar spine rotator cuff strength 4+/5 with pain cervical range of motion no radicular symptoms    Large Joint Arthrocentesis: L glenohumeral  Date/Time: 4/22/2019 4:17 PM  Consent given by: patient  Site marked: site marked  Timeout: Immediately prior to procedure a time out was called to verify the correct patient, procedure, equipment, support staff and site/side marked as required   Supporting Documentation  Indications: pain   Procedure Details  Location: shoulder - L glenohumeral  Preparation: Patient was prepped and draped in the usual sterile fashion  Needle size: 25 G  Approach: posterior  Medications administered: 80 mg methylPREDNISolone acetate 80 MG/ML; 4 mL lidocaine PF 1% 1 %  Patient tolerance: patient tolerated the procedure well with no immediate complications                Radiology:   AP, Scapular Y and Axillary Lateral of the left shoulder were ordered/reviewed to evauate shoulder pain.  I have compared to previous films she does have evidence of prior proximal humerus fracture that is healed it is rotated slightly the head is still well seated within the glenoid but it is a malunion    Assessment/Plan: Left shoulder pain following a proximal humerus fracture with a malunion " think it is reasonable to inject her she is done some physical therapy I think an injection will calm this down get her in some aggressive physical therapy we will see where she ends up.  If she fails to adequately improve I can have her see Dr. Pandya for possible surgical intervention  Cortisone Injection. See procedure note.  Cortisone Injection for DIAGNOSTIC and THERAPUTIC purposes.

## 2019-04-23 ENCOUNTER — APPOINTMENT (OUTPATIENT)
Dept: ONCOLOGY | Facility: HOSPITAL | Age: 59
End: 2019-04-23

## 2019-04-23 ENCOUNTER — APPOINTMENT (OUTPATIENT)
Dept: LAB | Facility: HOSPITAL | Age: 59
End: 2019-04-23

## 2019-04-24 RX ORDER — LIDOCAINE HYDROCHLORIDE 10 MG/ML
4 INJECTION, SOLUTION EPIDURAL; INFILTRATION; INTRACAUDAL; PERINEURAL
Status: COMPLETED | OUTPATIENT
Start: 2019-04-22 | End: 2019-04-22

## 2019-04-24 RX ORDER — METHYLPREDNISOLONE ACETATE 80 MG/ML
80 INJECTION, SUSPENSION INTRA-ARTICULAR; INTRALESIONAL; INTRAMUSCULAR; SOFT TISSUE
Status: COMPLETED | OUTPATIENT
Start: 2019-04-22 | End: 2019-04-22

## 2019-04-24 NOTE — TELEPHONE ENCOUNTER
SPOKE WITH PATIENT   PATIENT STATED THAT SHE RECEIVED HER [employer] INSULIN PUMP AND WAS TOLD TO CALL THE OFFICE TO SET UP A INSULIN PUMP CLASS. PATIENT ALSO STATED THAT SHE WAS RUNNING LOW ON HER INSULIN SINCE THEY CHANGE THE DOSAGE  LET PATIENT KNOW THAT I WILL SENT IN THE INSULIN PRESCRIPTION WITH THE CORRECT DOSAGE   PATIENT VOICE UNDERSTANDING  ALVAREZ FROM [employer] WILL CONTACT PATIENT TO GET HER IN FOR INSULIN PUMP CLASSES        ----- Message from Paula Amaya MA sent at 4/24/2019 10:18 AM EDT -----  Contact: pt  Pt called ask if you will call he back concerning her insulin. Stated she has questions

## 2019-04-26 ENCOUNTER — OFFICE VISIT (OUTPATIENT)
Dept: OBSTETRICS AND GYNECOLOGY | Facility: CLINIC | Age: 59
End: 2019-04-26

## 2019-04-26 VITALS
SYSTOLIC BLOOD PRESSURE: 144 MMHG | DIASTOLIC BLOOD PRESSURE: 86 MMHG | WEIGHT: 190 LBS | HEIGHT: 67 IN | BODY MASS INDEX: 29.82 KG/M2

## 2019-04-26 DIAGNOSIS — N90.89 VULVAR LESION: ICD-10-CM

## 2019-04-26 DIAGNOSIS — N95.2 ATROPHIC VAGINITIS: Primary | ICD-10-CM

## 2019-04-26 PROCEDURE — 99204 OFFICE O/P NEW MOD 45 MIN: CPT | Performed by: OBSTETRICS & GYNECOLOGY

## 2019-04-26 RX ORDER — ESTRADIOL 10 UG/1
1 INSERT VAGINAL 2 TIMES WEEKLY
Qty: 8 TABLET | Refills: 11 | Status: SHIPPED | OUTPATIENT
Start: 2019-04-29 | End: 2021-01-15

## 2019-04-26 NOTE — PROGRESS NOTES
TI Zabala  is a 58 y.o. female who presents for 2 issues.  First, she experiences severe vaginal dryness which creates dyspareunia with her .  She reports that she has tried personal lubricants of every type without any success.  Despite history of breast cancer in the past, she would like to consider vaginal estrogen.  Second, she has a vulvar lesion.  It has been present for several months.  It is neither increasing, nor decreasing in size.  It is an additional cause of the patient's dyspareunia.  She has multiple medical problems which are listed below.  I have reviewed the patient's medical history and discussed it with her.    Chief Complaint   Patient presents with   • New Gyn     Patient is here for a new gyn for a lesion on her left labia and painful intercourse.       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   • Cirrhosis (CMS/HCC)    • Colon polyps    • Deafness    • Depression    • Diabetes mellitus (CMS/HCC)     Adult onset, insulin requiring   • Duodenal ulcer with hemorrhage    • Esophageal varices determined by endoscopy (CMS/HCC)    • Fibromyalgia    • Gallbladder disease    • Gastric varices    • Gastroparesis    • Glaucoma    • Granuloma annulare    • H/O B12 deficiency    • H/O Bleeding ulcer     And gastroesophageal varices   • H/O mixed connective tissue disorder    • Hemorrhoids    • Hiatal hernia    • History of transfusion    • Hx of being hospitalized     In [place] for GI bleeding from ulcer and varices   • Hyperlipidemia    • Migraine    • DUKES (nonalcoholic steatohepatitis) 11/2016   • Orthostatic hypotension 02/2019   • BRICE (obstructive sleep apnea)    • Pancreas divisum    • Pancytopenia (CMS/HCC)     Chronic, due to cirrhosis and hypersplenism  "  • Peptic ulcer disease     And esophageal varices   • PONV (postoperative nausea and vomiting)    • RA (rheumatoid arthritis) (CMS/HCC)    • Seizure disorder (CMS/HCC)     LAST ONE SEVERAL YEARS AGO   • Seizures (CMS/HCC)    • Systemic lupus (CMS/HCC)        Past Surgical History:   Procedure Laterality Date   • BLADDER REPAIR  1991   • CATARACT EXTRACTION     • CHOLECYSTECTOMY  1994   • ENDOSCOPY N/A 11/7/2016    Procedure: ESOPHAGOGASTRODUODENOSCOPY WITH COLD POLYPECTOMY, BANDING,  AND CLIPS TIMES 2;  Surgeon: Rich Coleman MD;  Location: [facility] ENDOSCOPY;  Service:    • ENDOSCOPY N/A 12/22/2016    Procedure: ESOPHAGOGASTRODUODENOSCOPY WITH ESOPHAGEAL BANDING. 5 BANDS FIRED, 4 BANDS ADHERERD TO MUCOSA;  Surgeon: Rich Coleman MD;  Location: [facility] ENDOSCOPY;  Service:    • ENDOSCOPY N/A 2/15/2017    Procedure: ESOPHAGOGASTRODUODENOSCOPY AT BEDSIDE with esophageal varices banding (3);  Surgeon: Rich Coleman MD;  Location: [facility] ENDOSCOPY;  Service:    • ENDOSCOPY N/A 4/6/2017    Procedure: ESOPHAGOGASTRODUODENOSCOPY WITH ESOPHAGEAL VARICES BANDING x2;  Surgeon: Rich Coleman MD;  Location: [facility] ENDOSCOPY;  Service:    • ENDOSCOPY N/A 11/24/2017    Procedure: ESOPHAGOGASTRODUODENOSCOPY with biopsies;  Surgeon: Rich Coleman MD;  Location: [facility] ENDOSCOPY;  Service:    • ENDOSCOPY N/A 10/5/2018    Procedure: ESOPHAGOGASTRODUODENOSCOPY;  Surgeon: Rich Coleman MD;  Location: [facility] ENDOSCOPY;  Service: Gastroenterology   • ENDOSCOPY AND COLONOSCOPY  2014    Dr. Rich Coleman with findings of candida esophagitis   • EYE SURGERY     • HYSTERECTOMY  1986    partial   • JOINT REPLACEMENT     • KNEE SURGERY Right 1995    \"right knee recontstruction\"   • LIVER BIOPSY  01/2015   • MASS EXCISION     • STOMACH SURGERY         Social History     Socioeconomic History   • Marital status:      Spouse name: Jose   • Number of children: Not on file   • Years of education: Not on file   • " Highest education level: Not on file   Occupational History     Employer: DISABLED   Tobacco Use   • Smoking status: Former Smoker     Packs/day: 0.00     Years: 0.00     Pack years: 0.00     Last attempt to quit: 12/17/2015     Years since quitting: 3.3   • Smokeless tobacco: Never Used   Substance and Sexual Activity   • Alcohol use: No   • Drug use: No   • Sexual activity: Defer   Social History Narrative    Moved to [place] from [place]. She is currently medically disabled.       The following portions of the patient's history were reviewed and updated as appropriate: allergies, current medications, past family history, past medical history, past social history, past surgical history and problem list.    Review of Systems this is positive for vaginal dryness and dyspareunia.  It is negative for hot flashes or night sweats.  Is positive for intermittent abdominal and body pains.  It is negative for fever or chills.          Physical Exam   Constitutional: She is oriented to person, place, and time. She appears well-developed and well-nourished.   Genitourinary:         Genitourinary Comments: There is a 3 cm pigmented lesion involving the right labia majora.  It is rubbery and mobile.  There is no palpable adenopathy in the vulva or the groin.  There are no additional vulvar lesions    The vagina is atrophic, but otherwise without lesion.  Support for the vagina is adequate  No pelvic masses are palpable   Neurological: She is alert and oriented to person, place, and time.   Skin: Skin is warm and dry.   Psychiatric: She has a normal mood and affect. Her behavior is normal.   Nursing note and vitals reviewed.      Assessment    Silvia was seen today for new gyn.    Diagnoses and all orders for this visit:    Atrophic vaginitis    Vulvar lesion    Other orders  -     estradiol (VAGIFEM) 10 MCG tablet vaginal tablet; Insert 1 tablet into the vagina 2 (Two) Times a Week.        Plan  1. Atrophic vaginitis which  has caused dyspareunia and a significant disruption of the patient's quality of life.  We discussed options for management of this.  30 minutes of a 45-minute visit were spent in direct face-to-face counseling on this issue.  The patient has tried personal lubricant without success.  She has tried non-vaginal methods of intercourse and this is not sufficiently satisfying for her.  She would like to consider vaginal estrogen.  We discussed the benefits, risks and alternatives to this and I answered her questions.  She understands that this could increase the risk of recurrence of her breast cancer.  She has thought about this at great length and would like to proceed.  After discussion of the benefits, risks and alternatives, I feel that it would be appropriate to proceed under the circumstances.  A prescription for Vagifem will be sent.  The patient has been counseled regarding proper use.  She will follow-up in 3 months to evaluate effectiveness  2. Raised, pigmented vulvar lesion.  This is concerning in a patient who is immunocompromised.  I recommend an excisional biopsy.  Because of the lesion size, this will need to be performed in the operating room.  It could be done under local anesthesia with sedation to avoid stress on the patient's liver.  Also, she has some platelet disorders.  She will follow-up with oncology next week.  If they approved, excisional biopsy of this lesion could then be scheduled.  Patient will follow-up with me to let me know.    Return in about 3 months (around 7/26/2019).    Social History     Tobacco Use   Smoking Status Former Smoker   • Packs/day: 0.00   • Years: 0.00   • Pack years: 0.00   • Last attempt to quit: 12/17/2015   • Years since quitting: 3.3       3.

## 2019-04-29 ENCOUNTER — CLINICAL SUPPORT (OUTPATIENT)
Dept: ONCOLOGY | Facility: HOSPITAL | Age: 59
End: 2019-04-29

## 2019-04-29 ENCOUNTER — LAB (OUTPATIENT)
Dept: LAB | Facility: HOSPITAL | Age: 59
End: 2019-04-29

## 2019-04-29 ENCOUNTER — TELEPHONE (OUTPATIENT)
Dept: OBSTETRICS AND GYNECOLOGY | Facility: CLINIC | Age: 59
End: 2019-04-29

## 2019-04-29 DIAGNOSIS — D69.6 THROMBOCYTOPENIA (HCC): ICD-10-CM

## 2019-04-29 DIAGNOSIS — I85.10 SECONDARY ESOPHAGEAL VARICES WITHOUT BLEEDING (HCC): ICD-10-CM

## 2019-04-29 DIAGNOSIS — R18.8 CIRRHOSIS OF LIVER WITH ASCITES, UNSPECIFIED HEPATIC CIRRHOSIS TYPE (HCC): ICD-10-CM

## 2019-04-29 DIAGNOSIS — D50.9 IRON DEFICIENCY ANEMIA, UNSPECIFIED IRON DEFICIENCY ANEMIA TYPE: ICD-10-CM

## 2019-04-29 DIAGNOSIS — K74.69 OTHER CIRRHOSIS OF LIVER (HCC): ICD-10-CM

## 2019-04-29 DIAGNOSIS — K74.60 CIRRHOSIS OF LIVER WITH ASCITES, UNSPECIFIED HEPATIC CIRRHOSIS TYPE (HCC): ICD-10-CM

## 2019-04-29 DIAGNOSIS — K76.6 PORTAL HYPERTENSION (HCC): ICD-10-CM

## 2019-04-29 LAB
BASOPHILS # BLD AUTO: 0.04 10*3/MM3 (ref 0–0.2)
BASOPHILS NFR BLD AUTO: 0.7 % (ref 0–1.5)
DEPRECATED RDW RBC AUTO: 47.9 FL (ref 37–54)
EOSINOPHIL # BLD AUTO: 0.13 10*3/MM3 (ref 0–0.4)
EOSINOPHIL NFR BLD AUTO: 2.2 % (ref 0.3–6.2)
ERYTHROCYTE [DISTWIDTH] IN BLOOD BY AUTOMATED COUNT: 14.2 % (ref 12.3–15.4)
HCT VFR BLD AUTO: 36 % (ref 34–46.6)
HGB BLD-MCNC: 12.2 G/DL (ref 12–15.9)
IMM GRANULOCYTES # BLD AUTO: 0.1 10*3/MM3 (ref 0–0.05)
IMM GRANULOCYTES NFR BLD AUTO: 1.7 % (ref 0–0.5)
LYMPHOCYTES # BLD AUTO: 0.58 10*3/MM3 (ref 0.7–3.1)
LYMPHOCYTES NFR BLD AUTO: 9.7 % (ref 19.6–45.3)
MCH RBC QN AUTO: 31.1 PG (ref 26.6–33)
MCHC RBC AUTO-ENTMCNC: 33.9 G/DL (ref 31.5–35.7)
MCV RBC AUTO: 91.8 FL (ref 79–97)
MONOCYTES # BLD AUTO: 0.53 10*3/MM3 (ref 0.1–0.9)
MONOCYTES NFR BLD AUTO: 8.9 % (ref 5–12)
NEUTROPHILS # BLD AUTO: 4.57 10*3/MM3 (ref 1.7–7)
NEUTROPHILS NFR BLD AUTO: 76.8 % (ref 42.7–76)
NRBC BLD AUTO-RTO: 0 /100 WBC (ref 0–0.2)
PLATELET # BLD AUTO: 98 10*3/MM3 (ref 140–450)
PMV BLD AUTO: 10.5 FL (ref 6–12)
RBC # BLD AUTO: 3.92 10*6/MM3 (ref 3.77–5.28)
WBC NRBC COR # BLD: 5.95 10*3/MM3 (ref 3.4–10.8)

## 2019-04-29 PROCEDURE — 36415 COLL VENOUS BLD VENIPUNCTURE: CPT | Performed by: INTERNAL MEDICINE

## 2019-04-29 PROCEDURE — 85025 COMPLETE CBC W/AUTO DIFF WBC: CPT | Performed by: INTERNAL MEDICINE

## 2019-04-29 NOTE — PROGRESS NOTES
Copy of labs given to pt. Counts stable and improved. Platelets increased to 98. WBC and HGB also improved. Pt states she is no longer taking prednisone 10 mg daily. Pt states she is feeling ok. She states her OBGYN wants to do a biopsy but wants clearance from Dr. Mcdowell once platelets increase. I called Dr. Hong's office to find out what documentation is needed. She states she will pass along the labs and question to Dr. Hong.     I spoke directly on the phone with Dr. Hong (cell [phone]). He states pt has approx a 3cm lesion on her labia majora that he feels he needs to remove/biopsy. If appropriate he would like to do the surgical procedure under local sedation (given her liver) and remove the lesion, send it for pathology. He wants to know from Dr. Mcdowell if he feels this is also appropriate, or if he should biopsy it in the office and if it is negative leave it alone. Message sent to Dr. Mcdowell, I gave Dr. Jeremias Hong's number just in case they want to speak directly or I will call Dr. Hong back once Dr. Mcdowell advises.       Sukhdeep Mcdowell MD Steitz, Belen MCCORMICK RN             It should be fine to remove the lesion as long as platelets > 75K      Attempted to call alec Mcmahan VM with Dr. Mcdowell's recommendations.     Lab Results   Component Value Date    WBC 5.95 04/29/2019    HGB 12.2 04/29/2019    HCT 36.0 04/29/2019    MCV 91.8 04/29/2019    PLT 98 (L) 04/29/2019

## 2019-04-29 NOTE — TELEPHONE ENCOUNTER
Belen from Dr. Mcdowell office called said we share a mutual patient mrs. Zabala and said that they wanted to know or get an ok for pt to proceed with biopsy she said that her palettes are currently at 98. They would like a call back or if you need documentation said you can call them to receive those.     Callback number [phone]

## 2019-04-30 ENCOUNTER — OFFICE VISIT (OUTPATIENT)
Dept: FAMILY MEDICINE CLINIC | Facility: CLINIC | Age: 59
End: 2019-04-30

## 2019-04-30 ENCOUNTER — TELEPHONE (OUTPATIENT)
Dept: ORTHOPEDIC SURGERY | Facility: CLINIC | Age: 59
End: 2019-04-30

## 2019-04-30 VITALS
HEART RATE: 94 BPM | HEIGHT: 67 IN | BODY MASS INDEX: 29.19 KG/M2 | DIASTOLIC BLOOD PRESSURE: 60 MMHG | WEIGHT: 186 LBS | SYSTOLIC BLOOD PRESSURE: 102 MMHG | OXYGEN SATURATION: 99 % | RESPIRATION RATE: 16 BRPM

## 2019-04-30 DIAGNOSIS — F32.A ANXIETY AND DEPRESSION: ICD-10-CM

## 2019-04-30 DIAGNOSIS — IMO0002 TYPE 2 DIABETES MELLITUS, UNCONTROLLED, WITH NEUROPATHY: ICD-10-CM

## 2019-04-30 DIAGNOSIS — Z86.69 HISTORY OF SEIZURE DISORDER: ICD-10-CM

## 2019-04-30 DIAGNOSIS — F41.9 ANXIETY AND DEPRESSION: ICD-10-CM

## 2019-04-30 DIAGNOSIS — S42.292S OTHER CLOSED DISPLACED FRACTURE OF PROXIMAL END OF LEFT HUMERUS, SEQUELA: Primary | ICD-10-CM

## 2019-04-30 DIAGNOSIS — M32.9 SYSTEMIC LUPUS ERYTHEMATOSUS, UNSPECIFIED SLE TYPE, UNSPECIFIED ORGAN INVOLVEMENT STATUS (HCC): ICD-10-CM

## 2019-04-30 DIAGNOSIS — M06.9 RHEUMATOID ARTHRITIS, INVOLVING UNSPECIFIED SITE, UNSPECIFIED RHEUMATOID FACTOR PRESENCE: ICD-10-CM

## 2019-04-30 DIAGNOSIS — Z86.69 HISTORY OF MIGRAINE: ICD-10-CM

## 2019-04-30 DIAGNOSIS — K75.81 NASH (NONALCOHOLIC STEATOHEPATITIS): ICD-10-CM

## 2019-04-30 DIAGNOSIS — G47.00 INSOMNIA, UNSPECIFIED TYPE: ICD-10-CM

## 2019-04-30 DIAGNOSIS — D50.9 IRON DEFICIENCY ANEMIA, UNSPECIFIED IRON DEFICIENCY ANEMIA TYPE: ICD-10-CM

## 2019-04-30 DIAGNOSIS — E55.9 VITAMIN D INSUFFICIENCY: ICD-10-CM

## 2019-04-30 DIAGNOSIS — K31.84 GASTROPARESIS: ICD-10-CM

## 2019-04-30 DIAGNOSIS — E55.9 VITAMIN D DEFICIENCY: Primary | ICD-10-CM

## 2019-04-30 PROCEDURE — 99204 OFFICE O/P NEW MOD 45 MIN: CPT | Performed by: NURSE PRACTITIONER

## 2019-04-30 RX ORDER — TRAZODONE HYDROCHLORIDE 100 MG/1
TABLET ORAL
Qty: 30 TABLET | Refills: 2 | Status: SHIPPED | OUTPATIENT
Start: 2019-04-30 | End: 2019-07-28 | Stop reason: SDUPTHER

## 2019-04-30 RX ORDER — OLANZAPINE 5 MG/1
5 TABLET ORAL NIGHTLY
COMMUNITY
End: 2021-01-29

## 2019-04-30 RX ORDER — AMITRIPTYLINE HYDROCHLORIDE 50 MG/1
50 TABLET, FILM COATED ORAL NIGHTLY
Qty: 30 TABLET | Refills: 2 | Status: SHIPPED | OUTPATIENT
Start: 2019-04-30 | End: 2019-07-28 | Stop reason: SDUPTHER

## 2019-04-30 NOTE — PROGRESS NOTES
Subjective   Silvia Zabala is a 58 y.o. female.     History of Present Illness   Silvia Zabala 58 y.o. female who presents today for a new patient appointment.  She no longer drives due to extensive health history and takes T[employer] to get to appts.  she has a history of   Patient Active Problem List   Diagnosis   • Cirrhosis of liver (CMS/HCC)   • Rheumatoid arthritis (CMS/HCC)   • Anxiety and depression   • Type 2 diabetes mellitus, uncontrolled, with neuropathy (CMS/HCC)   • Fibrositis   • Change in blood platelet count   • Pancytopenia (CMS/HCC)   • Hypersplenism   • Nausea & vomiting   • DUKES (nonalcoholic steatohepatitis)   • Hyperlipidemia   • Abdominal pain   • Hematemesis   • Ascites   • Portal hypertension (CMS/HCC)   • Systemic lupus (CMS/HCC)   • Secondary esophageal varices without bleeding (CMS/HCC)   • Gastroparesis   • Cirrhosis of liver with ascites (CMS/HCC)   • Diabetic ketoacidosis without coma associated with type 2 diabetes mellitus (CMS/HCC)   • Diabetic peripheral neuropathy (CMS/HCC)   • History of seizure disorder   • Hepatic encephalopathy (CMS/HCC)   • Abnormal finding of blood chemistry    • Thrombocytopenia (CMS/HCC)   • Fever   • Acute ITP (CMS/HCC)   • Degeneration of lumbosacral intervertebral disc   • Seizure (CMS/HCC)   • Spondylosis without myelopathy   • Intractable vomiting with nausea   • UGI bleed   • Migraine without aura   • Urinary retention   • Type 2 diabetes mellitus with hyperglycemia, with long-term current use of insulin (CMS/HCC)   • BRICE (obstructive sleep apnea)   • Gastroparesis   • Hyperammonemia (CMS/HCC)   • Hyponatremia   • Anemia   • Vitamin D insufficiency   • Hyperglycemia   • Dehydration   • Iron deficiency anemia   • Adverse effect of iron or its compound, subsequent encounter   • Hemorrhage of anus and rectum   .  she is here to establish care I reviewed the PFSH recorded today by my MA/LPN staff.   she   She has been feeling tired.  She sees Dr. Burnett  with endocrinology for management of type 2 DM.  Her last A1c in February was 11.1.  Her insulin was adjusted. She has labs next week and f/u appt on 5/20/19.     She has hx of DUKES for which she is followed by Dr. Montes.  She sees Dr. Mcdowell for anemia of chronic disease and thrombocytopenia.    She is seeing Dr. Coleman for DUKES, GERD and gastroparesis.   She sees Dr. Munoz for rheumatoid arthritis and lupus.   She is currently followed by Dr. Roa for humerus fracture from a fall a few months back.  She did not have surgical repair.     Patient is to have biopsy of vulvar lesion per Dr. Hong as her platelet count has improved.   She is UTD on mammogram. She had partial hysterectomy in 1986 but still has both ovaries.     Patient sees Dr. Tobin with neurology for hx of seizure disorder and migraines.  She states she has been well controlled on Keppra 500 mg BID and has not had seizure in several years.  She states Dr. Montes would like her to be weaned off Keppra due to her cirrhosis. She has appt coming up with Dr. Tobin to discuss this.     She had bladder repair and removal of mass in 1991.   She had cholecystectomy in 1994.  Right knee reconstruction in 1995.       Was taking cholecalciferol 50,000 units once weekly previously but has been taking OTC vitamin D for a month.  Needs this rechecked.     Patient is taking Xanax as needed for anxiety but plans to see psychiatrist for management of anxiety and depression. She states both have been worse due to her many chronic health conditions.  She is not out of this medication but is out of trazodone and Elavil which she is taking for insomnia.   The following portions of the patient's history were reviewed and updated as appropriate: allergies, current medications, past family history, past medical history, past social history, past surgical history and problem list.    Review of Systems   Constitutional: Positive for fatigue. Negative for unexpected  weight change.   Respiratory: Negative for shortness of breath.    Cardiovascular: Negative for chest pain and palpitations.   Psychiatric/Behavioral: Positive for dysphoric mood and sleep disturbance. Negative for behavioral problems, self-injury and suicidal ideas. The patient is nervous/anxious.        Objective   Physical Exam   Constitutional: She is oriented to person, place, and time. She appears well-developed and well-nourished.   Neck: Carotid bruit is not present.   Cardiovascular: Normal rate and regular rhythm.   Pulmonary/Chest: Effort normal and breath sounds normal.   Neurological: She is alert and oriented to person, place, and time.   Psychiatric: She has a normal mood and affect. Judgment normal.   Nursing note and vitals reviewed.      Assessment/Plan   Silvia was seen today for establish care.    Diagnoses and all orders for this visit:    Vitamin D deficiency  -     Vitamin D 25 hydroxy    History of seizure disorder    Anxiety and depression    Iron deficiency anemia, unspecified iron deficiency anemia type    Systemic lupus erythematosus, unspecified SLE type, unspecified organ involvement status (CMS/HCC)    Rheumatoid arthritis, involving unspecified site, unspecified rheumatoid factor presence (CMS/HCC)    Type 2 diabetes mellitus, uncontrolled, with neuropathy (CMS/HCC)    Vitamin D insufficiency    Gastroparesis    DUKES (nonalcoholic steatohepatitis)    Insomnia, unspecified type  -     amitriptyline (ELAVIL) 50 MG tablet; Take 1 tablet by mouth Every Night.  -     traZODone (DESYREL) 100 MG tablet; Take 1/2 to 1 tablet at bedtime as needed for insomnia    History of migraine  -     amitriptyline (ELAVIL) 50 MG tablet; Take 1 tablet by mouth Every Night.          Referral information given for [facility] as she is wanting to see specialist for anxiety and depression.  She will get xanax refilled through there as she is not out of medication.    F/u in 3 months.

## 2019-05-01 ENCOUNTER — TELEPHONE (OUTPATIENT)
Dept: OBSTETRICS AND GYNECOLOGY | Facility: CLINIC | Age: 59
End: 2019-05-01

## 2019-05-01 ENCOUNTER — DOCUMENTATION (OUTPATIENT)
Dept: OBSTETRICS AND GYNECOLOGY | Facility: CLINIC | Age: 59
End: 2019-05-01

## 2019-05-01 ENCOUNTER — TELEPHONE (OUTPATIENT)
Dept: GASTROENTEROLOGY | Facility: CLINIC | Age: 59
End: 2019-05-01

## 2019-05-01 ENCOUNTER — PREP FOR SURGERY (OUTPATIENT)
Dept: OTHER | Facility: HOSPITAL | Age: 59
End: 2019-05-01

## 2019-05-01 DIAGNOSIS — N90.89 VULVAR LESION: Primary | ICD-10-CM

## 2019-05-01 RX ORDER — SODIUM CHLORIDE 0.9 % (FLUSH) 0.9 %
3 SYRINGE (ML) INJECTION EVERY 12 HOURS SCHEDULED
Status: CANCELLED | OUTPATIENT
Start: 2019-06-03

## 2019-05-01 RX ORDER — SODIUM CHLORIDE 0.9 % (FLUSH) 0.9 %
3-10 SYRINGE (ML) INJECTION AS NEEDED
Status: CANCELLED | OUTPATIENT
Start: 2019-06-03

## 2019-05-01 NOTE — TELEPHONE ENCOUNTER
PT called states  states platelets are high enough for surgery. PT states it is 98,000. Pt # [phone]

## 2019-05-01 NOTE — TELEPHONE ENCOUNTER
Deniz from [facility] is calling regarding estradiol (VAGIFEM) 10 MCG tablet vaginal tablet, he wanted to verify that it is aware that patient has cirrhosis of liver before he fills medication. Please advise.       Thank you

## 2019-05-01 NOTE — PROGRESS NOTES
Phone call regarding 2 issues.  First, the patient has followed up with her hematologist, Dr. Mcdowell.  I have also spoken with Dr. Mcdowell's assistant, Belen, who informed me that Dr. Mcdowell is satisfied that the patient's platelet count is high enough to undergo the planned procedure.  Also, she should be able to tolerate this from the standpoint of her liver.  I spoke with the patient and she would like to proceed with an excisional biopsy.  We will do this under local anesthesia with sedation.  Also, the patient's pharmacy called and they were concerned regarding the use of vaginal estrogen with a history of cirrhosis of the liver.  The patient will follow up with her gastroenterologist for approval prior to filling the prescription.

## 2019-05-02 ENCOUNTER — DOCUMENTATION (OUTPATIENT)
Dept: OBSTETRICS AND GYNECOLOGY | Facility: CLINIC | Age: 59
End: 2019-05-02

## 2019-05-02 RX ORDER — LEVETIRACETAM 500 MG/1
TABLET ORAL
Qty: 24 TABLET | Refills: 0 | Status: SHIPPED | OUTPATIENT
Start: 2019-05-02 | End: 2021-01-29 | Stop reason: SDUPTHER

## 2019-05-06 ENCOUNTER — LAB (OUTPATIENT)
Dept: ENDOCRINOLOGY | Age: 59
End: 2019-05-06

## 2019-05-06 DIAGNOSIS — E11.65 TYPE 2 DIABETES MELLITUS WITH HYPERGLYCEMIA, WITH LONG-TERM CURRENT USE OF INSULIN (HCC): Primary | ICD-10-CM

## 2019-05-06 DIAGNOSIS — E78.5 HYPERLIPIDEMIA, UNSPECIFIED HYPERLIPIDEMIA TYPE: ICD-10-CM

## 2019-05-06 DIAGNOSIS — Z79.4 TYPE 2 DIABETES MELLITUS WITH HYPERGLYCEMIA, WITH LONG-TERM CURRENT USE OF INSULIN (HCC): Primary | ICD-10-CM

## 2019-05-06 DIAGNOSIS — E11.65 TYPE 2 DIABETES MELLITUS WITH HYPERGLYCEMIA, WITH LONG-TERM CURRENT USE OF INSULIN (HCC): ICD-10-CM

## 2019-05-06 DIAGNOSIS — Z79.4 TYPE 2 DIABETES MELLITUS WITH HYPERGLYCEMIA, WITH LONG-TERM CURRENT USE OF INSULIN (HCC): ICD-10-CM

## 2019-05-06 DIAGNOSIS — K74.60 CIRRHOSIS OF LIVER WITH ASCITES, UNSPECIFIED HEPATIC CIRRHOSIS TYPE (HCC): ICD-10-CM

## 2019-05-06 DIAGNOSIS — IMO0002 TYPE 2 DIABETES MELLITUS, UNCONTROLLED, WITH NEUROPATHY: ICD-10-CM

## 2019-05-06 DIAGNOSIS — R18.8 CIRRHOSIS OF LIVER WITH ASCITES, UNSPECIFIED HEPATIC CIRRHOSIS TYPE (HCC): ICD-10-CM

## 2019-05-06 LAB
25(OH)D3+25(OH)D2 SERPL-MCNC: 39.3 NG/ML (ref 30–100)
BUN SERPL-MCNC: 17 MG/DL (ref 6–20)
BUN/CREAT SERPL: 17.7 (ref 7–25)
CALCIUM SERPL-MCNC: 9.8 MG/DL (ref 8.6–10.5)
CHLORIDE SERPL-SCNC: 101 MMOL/L (ref 98–107)
CHOLEST SERPL-MCNC: 202 MG/DL (ref 0–200)
CO2 SERPL-SCNC: 26.7 MMOL/L (ref 22–29)
CREAT SERPL-MCNC: 0.96 MG/DL (ref 0.57–1)
FOLATE SERPL-MCNC: >20 NG/ML (ref 4.78–24.2)
GLUCOSE SERPL-MCNC: 75 MG/DL (ref 65–99)
HBA1C MFR BLD: 8.5 % (ref 4.8–5.6)
HDLC SERPL-MCNC: 82 MG/DL (ref 40–60)
INTERPRETATION: NORMAL
LDLC SERPL CALC-MCNC: 105 MG/DL (ref 0–100)
Lab: NORMAL
POTASSIUM SERPL-SCNC: 4.1 MMOL/L (ref 3.5–5.2)
SODIUM SERPL-SCNC: 140 MMOL/L (ref 136–145)
T4 FREE SERPL-MCNC: 1.07 NG/DL (ref 0.93–1.7)
TRIGL SERPL-MCNC: 76 MG/DL (ref 0–150)
TSH SERPL DL<=0.005 MIU/L-ACNC: 5.1 MIU/ML (ref 0.27–4.2)
VIT B12 SERPL-MCNC: 1880 PG/ML (ref 211–946)
VLDLC SERPL CALC-MCNC: 15.2 MG/DL

## 2019-05-09 ENCOUNTER — HOSPITAL ENCOUNTER (INPATIENT)
Facility: HOSPITAL | Age: 59
LOS: 5 days | Discharge: HOME | End: 2019-05-14
Attending: EMERGENCY MEDICINE | Admitting: INTERNAL MEDICINE

## 2019-05-09 DIAGNOSIS — E11.65 UNCONTROLLED TYPE 2 DIABETES MELLITUS WITH HYPERGLYCEMIA (HCC): ICD-10-CM

## 2019-05-09 DIAGNOSIS — K92.2 ACUTE UPPER GI BLEED: Primary | ICD-10-CM

## 2019-05-09 DIAGNOSIS — D62 ACUTE BLOOD LOSS ANEMIA: ICD-10-CM

## 2019-05-09 PROBLEM — N90.89 VULVAR LESION: Status: ACTIVE | Noted: 2019-05-09

## 2019-05-09 LAB
ABO GROUP BLD: NORMAL
ALBUMIN SERPL-MCNC: 2.8 G/DL (ref 3.5–5.2)
ALBUMIN/GLOB SERPL: 1.2 G/DL
ALP SERPL-CCNC: 123 U/L (ref 39–117)
ALT SERPL W P-5'-P-CCNC: 47 U/L (ref 1–33)
ANION GAP SERPL CALCULATED.3IONS-SCNC: 20.4 MMOL/L
APTT PPP: 28.9 SECONDS (ref 22.7–35.4)
ARTERIAL PATENCY WRIST A: POSITIVE
AST SERPL-CCNC: 32 U/L (ref 1–32)
ATMOSPHERIC PRESS: 747.1 MMHG
B-OH-BUTYR SERPL-SCNC: 1.44 MMOL/L (ref 0.02–0.27)
BASE EXCESS BLDA CALC-SCNC: -9.7 MMOL/L (ref 0–2)
BASOPHILS # BLD AUTO: 0.07 10*3/MM3 (ref 0–0.2)
BASOPHILS NFR BLD AUTO: 0.5 % (ref 0–1.5)
BDY SITE: ABNORMAL
BILIRUB SERPL-MCNC: 1.6 MG/DL (ref 0.2–1.2)
BILIRUB UR QL STRIP: NEGATIVE
BLD GP AB SCN SERPL QL: NEGATIVE
BUN BLD-MCNC: 60 MG/DL (ref 6–20)
BUN/CREAT SERPL: 54.5 (ref 7–25)
CALCIUM SPEC-SCNC: 8.3 MG/DL (ref 8.6–10.5)
CHLORIDE SERPL-SCNC: 90 MMOL/L (ref 98–107)
CLARITY UR: CLEAR
CO2 SERPL-SCNC: 14.6 MMOL/L (ref 22–29)
COLOR UR: YELLOW
CREAT BLD-MCNC: 1.1 MG/DL (ref 0.57–1)
DEPRECATED RDW RBC AUTO: 50.7 FL (ref 37–54)
EOSINOPHIL # BLD AUTO: 0.02 10*3/MM3 (ref 0–0.4)
EOSINOPHIL NFR BLD AUTO: 0.1 % (ref 0.3–6.2)
ERYTHROCYTE [DISTWIDTH] IN BLOOD BY AUTOMATED COUNT: 15.3 % (ref 12.3–15.4)
GFR SERPL CREATININE-BSD FRML MDRD: 51 ML/MIN/1.73
GLOBULIN UR ELPH-MCNC: 2.4 GM/DL
GLUCOSE BLD-MCNC: 675 MG/DL (ref 65–99)
GLUCOSE BLDC GLUCOMTR-MCNC: >599 MG/DL (ref 70–130)
GLUCOSE BLDC GLUCOMTR-MCNC: >599 MG/DL (ref 70–130)
GLUCOSE UR STRIP-MCNC: ABNORMAL MG/DL
HCO3 BLDA-SCNC: 13.8 MMOL/L (ref 22–28)
HCT VFR BLD AUTO: 23 % (ref 34–46.6)
HEMOCCULT STL QL: POSITIVE
HGB BLD-MCNC: 7.9 G/DL (ref 12–15.9)
HGB UR QL STRIP.AUTO: NEGATIVE
HOROWITZ INDEX BLD+IHG-RTO: 21 %
IMM GRANULOCYTES # BLD AUTO: 0.34 10*3/MM3 (ref 0–0.05)
IMM GRANULOCYTES NFR BLD AUTO: 2.4 % (ref 0–0.5)
INR PPP: 1.36 (ref 0.9–1.1)
KETONES UR QL STRIP: ABNORMAL
LEUKOCYTE ESTERASE UR QL STRIP.AUTO: NEGATIVE
LIPASE SERPL-CCNC: 8 U/L (ref 13–60)
LYMPHOCYTES # BLD AUTO: 1.03 10*3/MM3 (ref 0.7–3.1)
LYMPHOCYTES NFR BLD AUTO: 7.4 % (ref 19.6–45.3)
MAGNESIUM SERPL-MCNC: 1.7 MG/DL (ref 1.6–2.6)
MCH RBC QN AUTO: 31.7 PG (ref 26.6–33)
MCHC RBC AUTO-ENTMCNC: 34.3 G/DL (ref 31.5–35.7)
MCV RBC AUTO: 92.4 FL (ref 79–97)
MODALITY: ABNORMAL
MONOCYTES # BLD AUTO: 0.84 10*3/MM3 (ref 0.1–0.9)
MONOCYTES NFR BLD AUTO: 6 % (ref 5–12)
NEUTROPHILS # BLD AUTO: 11.69 10*3/MM3 (ref 1.7–7)
NEUTROPHILS NFR BLD AUTO: 83.6 % (ref 42.7–76)
NITRITE UR QL STRIP: NEGATIVE
NRBC BLD AUTO-RTO: 0.1 /100 WBC (ref 0–0.2)
O2 A-A PPRESDIFF RESPIRATORY: 0.7 MMHG
PCO2 BLDA: 21.5 MM HG (ref 35–45)
PH BLDA: 7.41 PH UNITS (ref 7.35–7.45)
PH UR STRIP.AUTO: <=5 [PH] (ref 5–8)
PHOSPHATE SERPL-MCNC: 3.3 MG/DL (ref 2.5–4.5)
PLATELET # BLD AUTO: 167 10*3/MM3 (ref 140–450)
PMV BLD AUTO: 11.5 FL (ref 6–12)
PO2 BLDA: 96.1 MM HG (ref 80–100)
POTASSIUM BLD-SCNC: 6.1 MMOL/L (ref 3.5–5.2)
PROT SERPL-MCNC: 5.2 G/DL (ref 6–8.5)
PROT UR QL STRIP: NEGATIVE
PROTHROMBIN TIME: 16.4 SECONDS (ref 11.7–14.2)
RBC # BLD AUTO: 2.49 10*6/MM3 (ref 3.77–5.28)
RH BLD: POSITIVE
SAO2 % BLDCOA: 97.8 % (ref 92–99)
SODIUM BLD-SCNC: 125 MMOL/L (ref 136–145)
SP GR UR STRIP: >=1.03 (ref 1–1.03)
T&S EXPIRATION DATE: NORMAL
TOTAL RATE: 22 BREATHS/MINUTE
TROPONIN T SERPL-MCNC: <0.01 NG/ML (ref 0–0.03)
UROBILINOGEN UR QL STRIP: ABNORMAL
WBC NRBC COR # BLD: 13.99 10*3/MM3 (ref 3.4–10.8)

## 2019-05-09 PROCEDURE — 82962 GLUCOSE BLOOD TEST: CPT

## 2019-05-09 PROCEDURE — 25010000002 OCTREOTIDE PER 25 MCG: Performed by: EMERGENCY MEDICINE

## 2019-05-09 PROCEDURE — 83690 ASSAY OF LIPASE: CPT | Performed by: EMERGENCY MEDICINE

## 2019-05-09 PROCEDURE — 80053 COMPREHEN METABOLIC PANEL: CPT | Performed by: EMERGENCY MEDICINE

## 2019-05-09 PROCEDURE — 85610 PROTHROMBIN TIME: CPT | Performed by: EMERGENCY MEDICINE

## 2019-05-09 PROCEDURE — 85730 THROMBOPLASTIN TIME PARTIAL: CPT | Performed by: EMERGENCY MEDICINE

## 2019-05-09 PROCEDURE — 81003 URINALYSIS AUTO W/O SCOPE: CPT | Performed by: EMERGENCY MEDICINE

## 2019-05-09 PROCEDURE — 82803 BLOOD GASES ANY COMBINATION: CPT

## 2019-05-09 PROCEDURE — 84484 ASSAY OF TROPONIN QUANT: CPT | Performed by: EMERGENCY MEDICINE

## 2019-05-09 PROCEDURE — 82272 OCCULT BLD FECES 1-3 TESTS: CPT | Performed by: EMERGENCY MEDICINE

## 2019-05-09 PROCEDURE — 86850 RBC ANTIBODY SCREEN: CPT | Performed by: EMERGENCY MEDICINE

## 2019-05-09 PROCEDURE — 25010000002 ONDANSETRON PER 1 MG: Performed by: EMERGENCY MEDICINE

## 2019-05-09 PROCEDURE — 86901 BLOOD TYPING SEROLOGIC RH(D): CPT | Performed by: EMERGENCY MEDICINE

## 2019-05-09 PROCEDURE — 93010 ELECTROCARDIOGRAM REPORT: CPT | Performed by: INTERNAL MEDICINE

## 2019-05-09 PROCEDURE — 85025 COMPLETE CBC W/AUTO DIFF WBC: CPT | Performed by: EMERGENCY MEDICINE

## 2019-05-09 PROCEDURE — 83735 ASSAY OF MAGNESIUM: CPT | Performed by: EMERGENCY MEDICINE

## 2019-05-09 PROCEDURE — 86923 COMPATIBILITY TEST ELECTRIC: CPT

## 2019-05-09 PROCEDURE — 93005 ELECTROCARDIOGRAM TRACING: CPT | Performed by: EMERGENCY MEDICINE

## 2019-05-09 PROCEDURE — 99285 EMERGENCY DEPT VISIT HI MDM: CPT

## 2019-05-09 PROCEDURE — 82010 KETONE BODYS QUAN: CPT | Performed by: EMERGENCY MEDICINE

## 2019-05-09 PROCEDURE — 86900 BLOOD TYPING SEROLOGIC ABO: CPT | Performed by: EMERGENCY MEDICINE

## 2019-05-09 PROCEDURE — 84100 ASSAY OF PHOSPHORUS: CPT | Performed by: EMERGENCY MEDICINE

## 2019-05-09 PROCEDURE — 36600 WITHDRAWAL OF ARTERIAL BLOOD: CPT

## 2019-05-09 PROCEDURE — 63710000001 INSULIN REGULAR HUMAN PER 5 UNITS: Performed by: EMERGENCY MEDICINE

## 2019-05-09 PROCEDURE — 25010000002 PROMETHAZINE PER 50 MG: Performed by: EMERGENCY MEDICINE

## 2019-05-09 RX ORDER — PANTOPRAZOLE SODIUM 40 MG/10ML
80 INJECTION, POWDER, LYOPHILIZED, FOR SOLUTION INTRAVENOUS ONCE
Status: COMPLETED | OUTPATIENT
Start: 2019-05-09 | End: 2019-05-09

## 2019-05-09 RX ORDER — PROMETHAZINE HYDROCHLORIDE 25 MG/ML
12.5 INJECTION, SOLUTION INTRAMUSCULAR; INTRAVENOUS ONCE
Status: COMPLETED | OUTPATIENT
Start: 2019-05-09 | End: 2019-05-09

## 2019-05-09 RX ORDER — ONDANSETRON 2 MG/ML
4 INJECTION INTRAMUSCULAR; INTRAVENOUS ONCE
Status: COMPLETED | OUTPATIENT
Start: 2019-05-09 | End: 2019-05-09

## 2019-05-09 RX ADMIN — SODIUM CHLORIDE 1000 ML: 9 INJECTION, SOLUTION INTRAVENOUS at 20:40

## 2019-05-09 RX ADMIN — PROMETHAZINE HYDROCHLORIDE 12.5 MG: 25 INJECTION INTRAMUSCULAR; INTRAVENOUS at 21:27

## 2019-05-09 RX ADMIN — PANTOPRAZOLE SODIUM 80 MG: 40 INJECTION, POWDER, FOR SOLUTION INTRAVENOUS at 22:02

## 2019-05-09 RX ADMIN — OCTREOTIDE ACETATE 25 MCG/HR: 500 INJECTION, SOLUTION INTRAVENOUS; SUBCUTANEOUS at 23:50

## 2019-05-09 RX ADMIN — SODIUM CHLORIDE 8 MG/HR: 900 INJECTION INTRAVENOUS at 22:09

## 2019-05-09 RX ADMIN — SODIUM CHLORIDE 1000 ML: 9 INJECTION, SOLUTION INTRAVENOUS at 21:45

## 2019-05-09 RX ADMIN — INSULIN HUMAN 15 UNITS: 100 INJECTION, SOLUTION PARENTERAL at 21:27

## 2019-05-09 RX ADMIN — INSULIN HUMAN 15 UNITS: 100 INJECTION, SOLUTION PARENTERAL at 23:26

## 2019-05-09 RX ADMIN — ONDANSETRON 4 MG: 2 INJECTION INTRAMUSCULAR; INTRAVENOUS at 20:41

## 2019-05-10 ENCOUNTER — ANESTHESIA EVENT (OUTPATIENT)
Dept: GASTROENTEROLOGY | Facility: HOSPITAL | Age: 59
End: 2019-05-10

## 2019-05-10 ENCOUNTER — ANESTHESIA (OUTPATIENT)
Dept: GASTROENTEROLOGY | Facility: HOSPITAL | Age: 59
End: 2019-05-10

## 2019-05-10 VITALS — OXYGEN SATURATION: 100 % | SYSTOLIC BLOOD PRESSURE: 128 MMHG | DIASTOLIC BLOOD PRESSURE: 76 MMHG

## 2019-05-10 LAB
ANION GAP SERPL CALCULATED.3IONS-SCNC: 15.9 MMOL/L
BASOPHILS # BLD AUTO: 0.03 10*3/MM3 (ref 0–0.2)
BASOPHILS NFR BLD AUTO: 0.3 % (ref 0–1.5)
BUN BLD-MCNC: 55 MG/DL (ref 6–20)
BUN/CREAT SERPL: 58.5 (ref 7–25)
CALCIUM SPEC-SCNC: 7.8 MG/DL (ref 8.6–10.5)
CHLORIDE SERPL-SCNC: 100 MMOL/L (ref 98–107)
CO2 SERPL-SCNC: 17.1 MMOL/L (ref 22–29)
CREAT BLD-MCNC: 0.94 MG/DL (ref 0.57–1)
D-LACTATE SERPL-SCNC: 2.2 MMOL/L (ref 0.5–2)
D-LACTATE SERPL-SCNC: 4.2 MMOL/L (ref 0.5–2)
DEPRECATED RDW RBC AUTO: 46.5 FL (ref 37–54)
EOSINOPHIL # BLD AUTO: 0.03 10*3/MM3 (ref 0–0.4)
EOSINOPHIL NFR BLD AUTO: 0.3 % (ref 0.3–6.2)
ERYTHROCYTE [DISTWIDTH] IN BLOOD BY AUTOMATED COUNT: 14.5 % (ref 12.3–15.4)
GFR SERPL CREATININE-BSD FRML MDRD: 62 ML/MIN/1.73
GLUCOSE BLD-MCNC: 500 MG/DL (ref 65–99)
GLUCOSE BLDC GLUCOMTR-MCNC: 118 MG/DL (ref 70–130)
GLUCOSE BLDC GLUCOMTR-MCNC: 120 MG/DL (ref 70–130)
GLUCOSE BLDC GLUCOMTR-MCNC: 125 MG/DL (ref 70–130)
GLUCOSE BLDC GLUCOMTR-MCNC: 154 MG/DL (ref 70–130)
GLUCOSE BLDC GLUCOMTR-MCNC: 194 MG/DL (ref 70–130)
GLUCOSE BLDC GLUCOMTR-MCNC: 196 MG/DL (ref 70–130)
GLUCOSE BLDC GLUCOMTR-MCNC: 243 MG/DL (ref 70–130)
GLUCOSE BLDC GLUCOMTR-MCNC: 283 MG/DL (ref 70–130)
GLUCOSE BLDC GLUCOMTR-MCNC: 287 MG/DL (ref 70–130)
GLUCOSE BLDC GLUCOMTR-MCNC: 331 MG/DL (ref 70–130)
GLUCOSE BLDC GLUCOMTR-MCNC: 383 MG/DL (ref 70–130)
GLUCOSE BLDC GLUCOMTR-MCNC: 462 MG/DL (ref 70–130)
GLUCOSE BLDC GLUCOMTR-MCNC: 505 MG/DL (ref 70–130)
GLUCOSE BLDC GLUCOMTR-MCNC: 525 MG/DL (ref 70–130)
GLUCOSE BLDC GLUCOMTR-MCNC: 547 MG/DL (ref 70–130)
GLUCOSE BLDC GLUCOMTR-MCNC: 575 MG/DL (ref 70–130)
GLUCOSE BLDC GLUCOMTR-MCNC: 577 MG/DL (ref 70–130)
HCT VFR BLD AUTO: 21.9 % (ref 34–46.6)
HCT VFR BLD AUTO: 26 % (ref 34–46.6)
HCT VFR BLD AUTO: 27.9 % (ref 34–46.6)
HGB BLD-MCNC: 7.6 G/DL (ref 12–15.9)
HGB BLD-MCNC: 8.7 G/DL (ref 12–15.9)
HGB BLD-MCNC: 8.7 G/DL (ref 12–15.9)
HOLD SPECIMEN: NORMAL
IMM GRANULOCYTES # BLD AUTO: 0.18 10*3/MM3 (ref 0–0.05)
IMM GRANULOCYTES NFR BLD AUTO: 2 % (ref 0–0.5)
LYMPHOCYTES # BLD AUTO: 1.08 10*3/MM3 (ref 0.7–3.1)
LYMPHOCYTES NFR BLD AUTO: 12.1 % (ref 19.6–45.3)
MAGNESIUM SERPL-MCNC: 1.6 MG/DL (ref 1.6–2.6)
MCH RBC QN AUTO: 31.4 PG (ref 26.6–33)
MCHC RBC AUTO-ENTMCNC: 34.7 G/DL (ref 31.5–35.7)
MCV RBC AUTO: 90.5 FL (ref 79–97)
MONOCYTES # BLD AUTO: 0.84 10*3/MM3 (ref 0.1–0.9)
MONOCYTES NFR BLD AUTO: 9.4 % (ref 5–12)
NEUTROPHILS # BLD AUTO: 6.79 10*3/MM3 (ref 1.7–7)
NEUTROPHILS NFR BLD AUTO: 75.9 % (ref 42.7–76)
NRBC BLD AUTO-RTO: 0.1 /100 WBC (ref 0–0.2)
NT-PROBNP SERPL-MCNC: 168.6 PG/ML (ref 5–900)
PHOSPHATE SERPL-MCNC: 2.1 MG/DL (ref 2.5–4.5)
PLATELET # BLD AUTO: 111 10*3/MM3 (ref 140–450)
PMV BLD AUTO: 10.8 FL (ref 6–12)
POTASSIUM BLD-SCNC: 4.3 MMOL/L (ref 3.5–5.2)
PROCALCITONIN SERPL-MCNC: 1.07 NG/ML (ref 0.1–0.25)
RBC # BLD AUTO: 2.42 10*6/MM3 (ref 3.77–5.28)
SODIUM BLD-SCNC: 133 MMOL/L (ref 136–145)
TROPONIN T SERPL-MCNC: <0.01 NG/ML (ref 0–0.03)
WBC NRBC COR # BLD: 8.95 10*3/MM3 (ref 3.4–10.8)

## 2019-05-10 PROCEDURE — 87040 BLOOD CULTURE FOR BACTERIA: CPT | Performed by: INTERNAL MEDICINE

## 2019-05-10 PROCEDURE — 63710000001 INSULIN REGULAR HUMAN PER 5 UNITS: Performed by: INTERNAL MEDICINE

## 2019-05-10 PROCEDURE — 83880 ASSAY OF NATRIURETIC PEPTIDE: CPT | Performed by: INTERNAL MEDICINE

## 2019-05-10 PROCEDURE — 84145 PROCALCITONIN (PCT): CPT | Performed by: INTERNAL MEDICINE

## 2019-05-10 PROCEDURE — 80048 BASIC METABOLIC PNL TOTAL CA: CPT | Performed by: INTERNAL MEDICINE

## 2019-05-10 PROCEDURE — 63710000001 INSULIN GLARGINE PER 5 UNITS: Performed by: INTERNAL MEDICINE

## 2019-05-10 PROCEDURE — 86900 BLOOD TYPING SEROLOGIC ABO: CPT

## 2019-05-10 PROCEDURE — 25010000002 PROPOFOL 10 MG/ML EMULSION: Performed by: ANESTHESIOLOGY

## 2019-05-10 PROCEDURE — 25010000002 PROMETHAZINE PER 50 MG: Performed by: INTERNAL MEDICINE

## 2019-05-10 PROCEDURE — 43244 EGD VARICES LIGATION: CPT | Performed by: INTERNAL MEDICINE

## 2019-05-10 PROCEDURE — 84484 ASSAY OF TROPONIN QUANT: CPT | Performed by: INTERNAL MEDICINE

## 2019-05-10 PROCEDURE — 82962 GLUCOSE BLOOD TEST: CPT

## 2019-05-10 PROCEDURE — 85025 COMPLETE CBC W/AUTO DIFF WBC: CPT | Performed by: INTERNAL MEDICINE

## 2019-05-10 PROCEDURE — 63710000001 INSULIN LISPRO (HUMAN) PER 5 UNITS: Performed by: INTERNAL MEDICINE

## 2019-05-10 PROCEDURE — 83605 ASSAY OF LACTIC ACID: CPT | Performed by: INTERNAL MEDICINE

## 2019-05-10 PROCEDURE — 25010000002 OCTREOTIDE PER 25 MCG: Performed by: EMERGENCY MEDICINE

## 2019-05-10 PROCEDURE — 99254 IP/OBS CNSLTJ NEW/EST MOD 60: CPT | Performed by: INTERNAL MEDICINE

## 2019-05-10 PROCEDURE — 25010000002 MORPHINE PER 10 MG: Performed by: INTERNAL MEDICINE

## 2019-05-10 PROCEDURE — 93005 ELECTROCARDIOGRAM TRACING: CPT | Performed by: INTERNAL MEDICINE

## 2019-05-10 PROCEDURE — 25010000003 CEFTRIAXONE PER 250 MG: Performed by: INTERNAL MEDICINE

## 2019-05-10 PROCEDURE — 36430 TRANSFUSION BLD/BLD COMPNT: CPT

## 2019-05-10 PROCEDURE — 85014 HEMATOCRIT: CPT | Performed by: INTERNAL MEDICINE

## 2019-05-10 PROCEDURE — 06L38CZ OCCLUSION OF ESOPHAGEAL VEIN WITH EXTRALUMINAL DEVICE, VIA NATURAL OR ARTIFICIAL OPENING ENDOSCOPIC: ICD-10-PCS | Performed by: INTERNAL MEDICINE

## 2019-05-10 PROCEDURE — 84100 ASSAY OF PHOSPHORUS: CPT | Performed by: INTERNAL MEDICINE

## 2019-05-10 PROCEDURE — 85018 HEMOGLOBIN: CPT | Performed by: INTERNAL MEDICINE

## 2019-05-10 PROCEDURE — 25010000002 PROMETHAZINE PER 50 MG: Performed by: EMERGENCY MEDICINE

## 2019-05-10 PROCEDURE — P9016 RBC LEUKOCYTES REDUCED: HCPCS

## 2019-05-10 PROCEDURE — 93010 ELECTROCARDIOGRAM REPORT: CPT | Performed by: INTERNAL MEDICINE

## 2019-05-10 PROCEDURE — 83735 ASSAY OF MAGNESIUM: CPT | Performed by: INTERNAL MEDICINE

## 2019-05-10 RX ORDER — PROPOFOL 10 MG/ML
VIAL (ML) INTRAVENOUS CONTINUOUS PRN
Status: DISCONTINUED | OUTPATIENT
Start: 2019-05-10 | End: 2019-05-10 | Stop reason: SURG

## 2019-05-10 RX ORDER — MAGNESIUM SULFATE HEPTAHYDRATE 40 MG/ML
2 INJECTION, SOLUTION INTRAVENOUS AS NEEDED
Status: DISCONTINUED | OUTPATIENT
Start: 2019-05-10 | End: 2019-05-14 | Stop reason: HOSPADM

## 2019-05-10 RX ORDER — CEFTRIAXONE SODIUM 2 G/50ML
2 INJECTION, SOLUTION INTRAVENOUS ONCE
Status: COMPLETED | OUTPATIENT
Start: 2019-05-10 | End: 2019-05-10

## 2019-05-10 RX ORDER — OXYCODONE HYDROCHLORIDE 5 MG/1
5 TABLET ORAL EVERY 8 HOURS PRN
Status: DISCONTINUED | OUTPATIENT
Start: 2019-05-10 | End: 2019-05-14 | Stop reason: HOSPADM

## 2019-05-10 RX ORDER — ONDANSETRON 4 MG/1
4 TABLET, FILM COATED ORAL EVERY 6 HOURS PRN
Status: DISCONTINUED | OUTPATIENT
Start: 2019-05-10 | End: 2019-05-14 | Stop reason: HOSPADM

## 2019-05-10 RX ORDER — ONDANSETRON 2 MG/ML
4 INJECTION INTRAMUSCULAR; INTRAVENOUS EVERY 6 HOURS PRN
Status: DISCONTINUED | OUTPATIENT
Start: 2019-05-10 | End: 2019-05-14 | Stop reason: HOSPADM

## 2019-05-10 RX ORDER — DEXTROSE MONOHYDRATE 25 G/50ML
25-50 INJECTION, SOLUTION INTRAVENOUS
Status: DISCONTINUED | OUTPATIENT
Start: 2019-05-10 | End: 2019-05-14 | Stop reason: HOSPADM

## 2019-05-10 RX ORDER — INSULIN GLARGINE 100 [IU]/ML
60 INJECTION, SOLUTION SUBCUTANEOUS ONCE
Status: COMPLETED | OUTPATIENT
Start: 2019-05-10 | End: 2019-05-10

## 2019-05-10 RX ORDER — INSULIN GLARGINE 100 [IU]/ML
60 INJECTION, SOLUTION SUBCUTANEOUS 2 TIMES DAILY
Status: DISCONTINUED | OUTPATIENT
Start: 2019-05-10 | End: 2019-05-14

## 2019-05-10 RX ORDER — PREGABALIN 75 MG/1
75 CAPSULE ORAL EVERY 12 HOURS SCHEDULED
Status: DISCONTINUED | OUTPATIENT
Start: 2019-05-10 | End: 2019-05-14 | Stop reason: HOSPADM

## 2019-05-10 RX ORDER — SODIUM CHLORIDE 0.9 % (FLUSH) 0.9 %
3-10 SYRINGE (ML) INJECTION AS NEEDED
Status: DISCONTINUED | OUTPATIENT
Start: 2019-05-10 | End: 2019-05-14 | Stop reason: HOSPADM

## 2019-05-10 RX ORDER — PROPOFOL 10 MG/ML
VIAL (ML) INTRAVENOUS AS NEEDED
Status: DISCONTINUED | OUTPATIENT
Start: 2019-05-10 | End: 2019-05-10 | Stop reason: SURG

## 2019-05-10 RX ORDER — PROMETHAZINE HYDROCHLORIDE 25 MG/ML
12.5 INJECTION, SOLUTION INTRAMUSCULAR; INTRAVENOUS ONCE
Status: COMPLETED | OUTPATIENT
Start: 2019-05-10 | End: 2019-05-10

## 2019-05-10 RX ORDER — MORPHINE SULFATE 2 MG/ML
1 INJECTION, SOLUTION INTRAMUSCULAR; INTRAVENOUS ONCE
Status: COMPLETED | OUTPATIENT
Start: 2019-05-10 | End: 2019-05-10

## 2019-05-10 RX ORDER — MAGNESIUM SULFATE 1 G/100ML
1 INJECTION INTRAVENOUS AS NEEDED
Status: DISCONTINUED | OUTPATIENT
Start: 2019-05-10 | End: 2019-05-14 | Stop reason: HOSPADM

## 2019-05-10 RX ORDER — LEVETIRACETAM 500 MG/1
500 TABLET ORAL 2 TIMES DAILY
Status: DISCONTINUED | OUTPATIENT
Start: 2019-05-10 | End: 2019-05-14 | Stop reason: HOSPADM

## 2019-05-10 RX ORDER — SODIUM CHLORIDE 0.9 % (FLUSH) 0.9 %
3 SYRINGE (ML) INJECTION EVERY 12 HOURS SCHEDULED
Status: DISCONTINUED | OUTPATIENT
Start: 2019-05-10 | End: 2019-05-14 | Stop reason: HOSPADM

## 2019-05-10 RX ORDER — PROMETHAZINE HYDROCHLORIDE 25 MG/ML
25 INJECTION, SOLUTION INTRAMUSCULAR; INTRAVENOUS EVERY 6 HOURS PRN
Status: DISCONTINUED | OUTPATIENT
Start: 2019-05-10 | End: 2019-05-14 | Stop reason: HOSPADM

## 2019-05-10 RX ORDER — LIDOCAINE HYDROCHLORIDE 20 MG/ML
INJECTION, SOLUTION INFILTRATION; PERINEURAL AS NEEDED
Status: DISCONTINUED | OUTPATIENT
Start: 2019-05-10 | End: 2019-05-10 | Stop reason: SURG

## 2019-05-10 RX ORDER — ALPRAZOLAM 0.5 MG/1
0.5 TABLET ORAL 2 TIMES DAILY PRN
Status: DISCONTINUED | OUTPATIENT
Start: 2019-05-10 | End: 2019-05-14 | Stop reason: HOSPADM

## 2019-05-10 RX ORDER — SODIUM CHLORIDE, SODIUM LACTATE, POTASSIUM CHLORIDE, CALCIUM CHLORIDE 600; 310; 30; 20 MG/100ML; MG/100ML; MG/100ML; MG/100ML
INJECTION, SOLUTION INTRAVENOUS CONTINUOUS PRN
Status: DISCONTINUED | OUTPATIENT
Start: 2019-05-10 | End: 2019-05-10 | Stop reason: SURG

## 2019-05-10 RX ADMIN — INSULIN LISPRO 2 UNITS: 100 INJECTION, SOLUTION INTRAVENOUS; SUBCUTANEOUS at 20:41

## 2019-05-10 RX ADMIN — LEVETIRACETAM 500 MG: 500 TABLET, FILM COATED ORAL at 09:47

## 2019-05-10 RX ADMIN — SODIUM CHLORIDE 4 UNITS/HR: 9 INJECTION, SOLUTION INTRAVENOUS at 01:42

## 2019-05-10 RX ADMIN — SODIUM CHLORIDE, PRESERVATIVE FREE 3 ML: 5 INJECTION INTRAVENOUS at 22:24

## 2019-05-10 RX ADMIN — OXYCODONE HYDROCHLORIDE 5 MG: 5 TABLET ORAL at 02:45

## 2019-05-10 RX ADMIN — PROPOFOL 80 MG: 10 INJECTION, EMULSION INTRAVENOUS at 15:20

## 2019-05-10 RX ADMIN — PROPOFOL 160 MCG/KG/MIN: 10 INJECTION, EMULSION INTRAVENOUS at 15:22

## 2019-05-10 RX ADMIN — SODIUM CHLORIDE 8 UNITS/HR: 9 INJECTION, SOLUTION INTRAVENOUS at 09:46

## 2019-05-10 RX ADMIN — SODIUM CHLORIDE 8 MG/HR: 900 INJECTION INTRAVENOUS at 11:48

## 2019-05-10 RX ADMIN — SODIUM CHLORIDE 8 MG/HR: 900 INJECTION INTRAVENOUS at 17:13

## 2019-05-10 RX ADMIN — INSULIN HUMAN 10 UNITS: 100 INJECTION, SOLUTION PARENTERAL at 05:46

## 2019-05-10 RX ADMIN — OCTREOTIDE ACETATE 25 MCG/HR: 500 INJECTION, SOLUTION INTRAVENOUS; SUBCUTANEOUS at 17:13

## 2019-05-10 RX ADMIN — SODIUM CHLORIDE, PRESERVATIVE FREE 3 ML: 5 INJECTION INTRAVENOUS at 08:47

## 2019-05-10 RX ADMIN — MORPHINE SULFATE 1 MG: 2 INJECTION, SOLUTION INTRAMUSCULAR; INTRAVENOUS at 16:30

## 2019-05-10 RX ADMIN — OXYCODONE HYDROCHLORIDE 5 MG: 5 TABLET ORAL at 10:44

## 2019-05-10 RX ADMIN — PROMETHAZINE HYDROCHLORIDE 25 MG: 25 INJECTION, SOLUTION INTRAMUSCULAR; INTRAVENOUS at 18:47

## 2019-05-10 RX ADMIN — SODIUM CHLORIDE 8 MG/HR: 900 INJECTION INTRAVENOUS at 01:28

## 2019-05-10 RX ADMIN — CEFTRIAXONE SODIUM 2 G: 2 INJECTION, SOLUTION INTRAVENOUS at 08:38

## 2019-05-10 RX ADMIN — LEVETIRACETAM 500 MG: 500 TABLET, FILM COATED ORAL at 20:40

## 2019-05-10 RX ADMIN — INSULIN LISPRO 6 UNITS: 100 INJECTION, SOLUTION INTRAVENOUS; SUBCUTANEOUS at 23:56

## 2019-05-10 RX ADMIN — PROMETHAZINE HYDROCHLORIDE 12.5 MG: 25 INJECTION INTRAMUSCULAR; INTRAVENOUS at 00:25

## 2019-05-10 RX ADMIN — INSULIN GLARGINE 60 UNITS: 100 INJECTION, SOLUTION SUBCUTANEOUS at 20:41

## 2019-05-10 RX ADMIN — LIDOCAINE HYDROCHLORIDE 60 MG: 20 INJECTION, SOLUTION INFILTRATION; PERINEURAL at 15:20

## 2019-05-10 RX ADMIN — PREGABALIN 75 MG: 75 CAPSULE ORAL at 20:41

## 2019-05-10 RX ADMIN — SODIUM CHLORIDE 8 MG/HR: 900 INJECTION INTRAVENOUS at 22:27

## 2019-05-10 RX ADMIN — PREGABALIN 75 MG: 75 CAPSULE ORAL at 09:47

## 2019-05-10 RX ADMIN — OXYCODONE HYDROCHLORIDE 5 MG: 5 TABLET ORAL at 18:47

## 2019-05-10 RX ADMIN — INSULIN GLARGINE 60 UNITS: 100 INJECTION, SOLUTION SUBCUTANEOUS at 09:47

## 2019-05-10 RX ADMIN — PROMETHAZINE HYDROCHLORIDE 25 MG: 25 INJECTION, SOLUTION INTRAMUSCULAR; INTRAVENOUS at 08:38

## 2019-05-10 RX ADMIN — INSULIN GLARGINE 60 UNITS: 100 INJECTION, SOLUTION SUBCUTANEOUS at 03:18

## 2019-05-10 RX ADMIN — SODIUM CHLORIDE, POTASSIUM CHLORIDE, SODIUM LACTATE AND CALCIUM CHLORIDE: 600; 310; 30; 20 INJECTION, SOLUTION INTRAVENOUS at 15:17

## 2019-05-10 RX ADMIN — SODIUM CHLORIDE 8 MG/HR: 900 INJECTION INTRAVENOUS at 06:31

## 2019-05-10 NOTE — NURSING NOTE
Patient noted to describe midsternal chest pain, 8/10, pressure after EGD procedure. VSS. EKG obtained. Dr. Vasquez notified of patient's c/o pain, VSS, and EKG. Orders given to obtain a stat troponin, if elevated consult cardiology.

## 2019-05-10 NOTE — CONSULTS
58 y.o.  Patient Care Team:  Kelly Flores APRN as PCP - General (Family Medicine)  Blanca Pitts MD as PCP - Claims Attributed  Sukhdeep Mcdowell MD as Consulting Physician (Hematology and Oncology)  Blanca Pitts MD as Referring Physician (Internal Medicine)  Rich Coleman MD as Consulting Physician (Gastroenterology)  Merary Burnett MD as Consulting Physician (Endocrinology)  Kervin Rajput Jr., MD as Consulting Physician (Hematology and Oncology)      Chief Complaint   Patient presents with   • Nausea   • Vomiting   • Abdominal Pain   • Hyperglycemia       HPI    58-year-old white female presents to the hospital with significant nausea, vomiting associated with some diarrhea.  She has started noticing coffee-ground emesis along with black tarry stools for the last 3 days.  Patient is admitted for GI work-up.  In the emergency department patient was noted to have blood sugar around 600 mg/dL, she is currently admitted to the ICU and has been started on the insulin drip.  Endocrinology has been consulted for elevated blood sugars.    Type 2 diabetes mellitus-diagnosed in her 20s.  Patient has been noncompliant with the management of her insulin regimen either due to the financial issues, frequent hospitalizations, uncontrolled gastroparesis.   During her office visit in February 2019 we have discussed to start the patient on Dexcom, which the patient's son has refused and also the patient agreed with this since decision.  We felt that the Dexcom could help in terms of getting aggressive with her insulin regimen without risking low blood sugars.  But now that is not going to be an option with the decision that they have made.    At home she is on Lantus 80 units twice daily, Humalog 40 units with each meal along with 20-30 units with snacks.  Blood sugars are extremely variable.  Highest blood sugar is 300, lowest blood sugar is around 50s.  Last eye examination was in January 2018, no history of  diabetic retinopathy  Does have history of diabetic neuropathy  Does have significant gastroparesis.    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   • Cirrhosis (CMS/HCC)    • Colon polyps    • Deafness    • Depression    • Diabetes mellitus (CMS/HCC)     Adult onset, insulin requiring   • Duodenal ulcer with hemorrhage    • Esophageal varices determined by endoscopy (CMS/HCC)    • Fibromyalgia    • Gallbladder disease    • Gastric varices    • Gastroparesis    • Glaucoma    • Granuloma annulare    • H/O B12 deficiency    • H/O Bleeding ulcer     And gastroesophageal varices   • H/O mixed connective tissue disorder    • Hemorrhoids    • Hiatal hernia    • History of transfusion    • Hx of being hospitalized     In [place] for GI bleeding from ulcer and varices   • Hyperlipidemia    • Migraine    • DUKES (nonalcoholic steatohepatitis) 11/2016   • Orthostatic hypotension 02/2019   • BRICE (obstructive sleep apnea)    • Pancreas divisum    • Pancytopenia (CMS/HCC)     Chronic, due to cirrhosis and hypersplenism   • Peptic ulcer disease     And esophageal varices   • PONV (postoperative nausea and vomiting)    • RA (rheumatoid arthritis) (CMS/HCC)    • Seizure disorder (CMS/HCC)     LAST ONE SEVERAL YEARS AGO   • Seizures (CMS/HCC)    • Systemic lupus (CMS/HCC)        Family History   Problem Relation Age of Onset   • Liver disease Other    • Depression Other    • Heart disease Other    • Hypertension Other    • Diabetes Other    • Breast cancer Other    • Brain cancer Other    • Uterine cancer Other    • Colon cancer Other    • Leukemia Other    • Colon cancer Maternal Aunt    • Hypertension Mother    • Diabetes type II Mother    • Rheum arthritis Mother    • Liver disease Mother         DUKES   •  "Heart disease Father    • Diabetes type II Father    • Diabetes type II Sister    • Lupus Sister    • Malig Hyperthermia Neg Hx        Social History     Socioeconomic History   • Marital status:      Spouse name: Jose   • Number of children: Not on file   • Years of education: Not on file   • Highest education level: Not on file   Occupational History     Employer: DISABLED   Tobacco Use   • Smoking status: Former Smoker     Packs/day: 0.00     Years: 0.00     Pack years: 0.00     Last attempt to quit: 12/17/2015     Years since quitting: 3.3   • Smokeless tobacco: Never Used   Substance and Sexual Activity   • Alcohol use: No   • Drug use: No   • Sexual activity: Yes     Partners: Male   Social History Narrative    Moved to [place] from [place]. She is currently medically disabled.       Allergies   Allergen Reactions   • Albuterol Anaphylaxis   • Tramadol Nausea Only, Other (See Comments) and GI Intolerance     Per pt causes her \"palsy, meaning shaking and tremors\"    • Lactulose Nausea And Vomiting and Other (See Comments)     Severe abdominal pain   • Quinine Derivatives Nausea And Vomiting         Current Facility-Administered Medications:   •  ALPRAZolam (XANAX) tablet 0.5 mg, 0.5 mg, Oral, BID PRN, Juan Martinez MD  •  dextrose (D50W) 25 g/ 50mL Intravenous Solution 25-50 mL, 25-50 mL, Intravenous, Q30 Min PRN, Juan Martinez MD  •  insulin glargine (LANTUS) injection 60 Units, 60 Units, Subcutaneous, BID, Juan Martinez MD, 60 Units at 05/10/19 0947  •  insulin regular (HumuLIN R,NovoLIN R) 100 Units in sodium chloride 0.9 % 100 mL (1 Units/mL) infusion, 1-20 Units/hr, Intravenous, Titrated, Juan Martinez MD, Last Rate: 8 mL/hr at 05/10/19 0946, 8 Units/hr at 05/10/19 0946  •  levETIRAcetam (KEPPRA) tablet 500 mg, 500 mg, Oral, BID, Juan Martinez MD, 500 mg at 05/10/19 0947  •  Magnesium Sulfate 2 gram infusion - Mg less than or equal to 1.5 mg/dL, 2 g, Intravenous, PRN **OR** " Magnesium Sulfate 1 gram infusion - Mg 1.6-1.9 mg/dL, 1 g, Intravenous, PRN, Juan Martinez MD  •  octreotide (sandoSTATIN) 500 mcg in sodium chloride 0.9 % 100 mL (5 mcg/mL) infusion, 25 mcg/hr, Intravenous, Continuous, Reynaldo Medina MD, Last Rate: 5 mL/hr at 05/10/19 0009, 25 mcg/hr at 05/10/19 0009  •  ondansetron (ZOFRAN) tablet 4 mg, 4 mg, Oral, Q6H PRN **OR** ondansetron (ZOFRAN) injection 4 mg, 4 mg, Intravenous, Q6H PRN, Juan Martinez MD  •  oxyCODONE (ROXICODONE) immediate release tablet 5 mg, 5 mg, Oral, Q8H PRN, Juan Martinez MD, 5 mg at 05/10/19 1044  •  pantoprazole (PROTONIX) 40 mg/100 mL (0.4 mg/mL) in 0.9% NS IVPB, 8 mg/hr, Intravenous, Continuous, Reynaldo Medina MD, Last Rate: 20 mL/hr at 05/10/19 0631, 8 mg/hr at 05/10/19 0631  •  potassium phosphate 45 mmol in sodium chloride 0.9 % 500 mL infusion, 45 mmol, Intravenous, PRN **OR** potassium phosphate 30 mmol in sodium chloride 0.9 % 250 mL infusion, 30 mmol, Intravenous, PRN **OR** potassium phosphate 15 mmol in sodium chloride 0.9 % 100 mL infusion, 15 mmol, Intravenous, PRN **OR** sodium phosphates 40 mmol in sodium chloride 0.9 % 500 mL IVPB, 40 mmol, Intravenous, PRN **OR** sodium phosphates 20 mmol in sodium chloride 0.9 % 250 mL IVPB, 20 mmol, Intravenous, PRN, Juan Martinez MD  •  pregabalin (LYRICA) capsule 75 mg, 75 mg, Oral, Q12H, Juan Martinez MD, 75 mg at 05/10/19 0947  •  promethazine (PHENERGAN) injection 25 mg, 25 mg, Intravenous, Q6H PRN, Abdirizak Vasquez MD, 25 mg at 05/10/19 0838  •  sodium chloride 0.9 % flush 3 mL, 3 mL, Intravenous, Q12H, Juan Martinez MD, 3 mL at 05/10/19 0847  •  sodium chloride 0.9 % flush 3-10 mL, 3-10 mL, Intravenous, PRN, Juan Martinez MD         Review of Systems   Constitutional: Positive for appetite change and fatigue. Negative for fever.   Eyes: Negative for visual disturbance.   Respiratory: Negative for shortness of breath.    Cardiovascular: Negative for palpitations and  leg swelling.   Gastrointestinal: Positive for nausea and vomiting. Negative for abdominal pain.   Endocrine: Negative for polydipsia and polyuria.   Musculoskeletal: Positive for arthralgias. Negative for joint swelling and neck pain.   Skin: Negative for rash.   Neurological: Positive for weakness. Negative for numbness.   Psychiatric/Behavioral: Negative for behavioral problems.     Objective     Vital Signs  Temp:  [98.3 °F (36.8 °C)-100.1 °F (37.8 °C)] 99.1 °F (37.3 °C)  Heart Rate:  [112-126] 112  Resp:  [16-21] 16  BP: ()/(43-78) 113/68    Physical Exam  Physical Exam   Constitutional: She is oriented to person, place, and time. She appears well-nourished.   Obese     HENT:   Head: Normocephalic and atraumatic.   Wide neck   Eyes: Conjunctivae and EOM are normal. No scleral icterus.   Neck: Normal range of motion. Neck supple. No thyromegaly present.   Cardiovascular: Normal rate and normal heart sounds.   Pulmonary/Chest: Effort normal and breath sounds normal. No stridor. She has no wheezes.   Abdominal: Soft. Bowel sounds are normal. She exhibits distension. There is no tenderness.   Central obesity   Musculoskeletal: She exhibits deformity. She exhibits no edema or tenderness.   Neurological: She is alert and oriented to person, place, and time.   Skin: Skin is warm and dry. She is not diaphoretic.   Psychiatric: She has a normal mood and affect.   Vitals reviewed.      Results Review:    I reviewed the patient's new clinical results and summarized them in the HPI and in plan.  Glucose   Date/Time Value Ref Range Status   05/10/2019 1042 196 (H) 70 - 130 mg/dL Final   05/10/2019 0942 243 (H) 70 - 130 mg/dL Final   05/10/2019 0845 287 (H) 70 - 130 mg/dL Final   05/10/2019 0734 331 (H) 70 - 130 mg/dL Final   05/10/2019 0631 383 (H) 70 - 130 mg/dL Final   05/10/2019 0538 462 (C) 70 - 130 mg/dL Final   05/10/2019 0436 505 (C) 70 - 130 mg/dL Final   05/10/2019 0342 525 (C) 70 - 130 mg/dL Final    05/10/2019 0232 575 (C) 70 - 130 mg/dL Final   05/10/2019 0106 577 (C) 70 - 130 mg/dL Final   05/10/2019 0024 547 (C) 70 - 130 mg/dL Final   05/09/2019 2236 >599 (C) 70 - 130 mg/dL Final     Lab Results (last 24 hours)     Procedure Component Value Units Date/Time    POC Glucose Once [[id number]]  (Abnormal) Collected:  05/10/19 1042    Specimen:  Blood Updated:  05/10/19 1103     Glucose 196 mg/dL     POC Glucose Once [[id number]]  (Abnormal) Collected:  05/10/19 0942    Specimen:  Blood Updated:  05/10/19 1002     Glucose 243 mg/dL     Hemoglobin & Hematocrit, Blood [[id number]]  (Abnormal) Collected:  05/10/19 0846    Specimen:  Blood Updated:  05/10/19 0925     Hemoglobin 8.7 g/dL      Hematocrit 26.0 %     Lactic Acid, Reflex [[id number]]  (Abnormal) Collected:  05/10/19 0846    Specimen:  Blood Updated:  05/10/19 0924     Lactate 2.2 mmol/L     POC Glucose Once [[id number]]  (Abnormal) Collected:  05/10/19 0845    Specimen:  Blood Updated:  05/10/19 0905     Glucose 287 mg/dL     POC Glucose Once [[id number]]  (Abnormal) Collected:  05/10/19 0734    Specimen:  Blood Updated:  05/10/19 0746     Glucose 331 mg/dL     Lactic Acid, Reflex Timer (This will reflex a repeat order 3-3:15 hours after ordered.) [[id number]] Collected:  05/10/19 0359    Specimen:  Blood Updated:  05/10/19 0745     Extra Tube Hold for add-ons.     Comment: Auto resulted.       POC Glucose Once [[id number]]  (Abnormal) Collected:  05/10/19 0631    Specimen:  Blood Updated:  05/10/19 0653     Glucose 383 mg/dL     Procalcitonin [[id number]]  (Abnormal) Collected:  05/10/19 0359    Specimen:  Blood Updated:  05/10/19 0648     Procalcitonin 1.07 ng/mL     Narrative:       As a Marker for Sepsis (Non-Neonates):   1. <0.5 ng/mL represents a low risk of severe sepsis and/or septic shock.  1. >2 ng/mL represents a high risk of severe sepsis and/or septic shock.    As a Marker for Lower Respiratory Tract Infections that require antibiotic therapy:  PCT  "on Admission     Antibiotic Therapy             6-12 Hrs later  > 0.5                Strongly Recommended            >0.25 - <0.5         Recommended  0.1 - 0.25           Discouraged                   Remeasure/reassess PCT  <0.1                 Strongly Discouraged          Remeasure/reassess PCT      As 28 day mortality risk marker: \"Change in Procalcitonin Result\" (> 80 % or <=80 %) if Day 0 (or Day 1) and Day 4 values are available. Refer to [email or web address]   Change in PCT <=80 %   A decrease of PCT levels below or equal to 80 % defines a positive change in PCT test result representing a higher risk for 28-day all-cause mortality of patients diagnosed with severe sepsis or septic shock.  Change in PCT > 80 %   A decrease of PCT levels of more than 80 % defines a negative change in PCT result representing a lower risk for 28-day all-cause mortality of patients diagnosed with severe sepsis or septic shock.                Blood Culture - Blood, Arm, Left [[id number]] Collected:  05/10/19 0555    Specimen:  Blood from Arm, Left Updated:  05/10/19 0632    Blood Culture - Blood, Arm, Right [[id number]] Collected:  05/10/19 0600    Specimen:  Blood from Arm, Right Updated:  05/10/19 0631    POC Glucose Once [[id number]]  (Abnormal) Collected:  05/10/19 0538    Specimen:  Blood Updated:  05/10/19 0549     Glucose 462 mg/dL     Basic Metabolic Panel [[id number]]  (Abnormal) Collected:  05/10/19 0359    Specimen:  Blood Updated:  05/10/19 0524     Glucose 500 mg/dL      BUN 55 mg/dL      Creatinine 0.94 mg/dL      Sodium 133 mmol/L      Potassium 4.3 mmol/L      Chloride 100 mmol/L      CO2 17.1 mmol/L      Calcium 7.8 mg/dL      eGFR Non African Amer 62 mL/min/1.73      BUN/Creatinine Ratio 58.5     Anion Gap 15.9 mmol/L     Narrative:       GFR Normal >60  Chronic Kidney Disease <60  Kidney Failure <15    Phosphorus [[id number]]  (Abnormal) Collected:  05/10/19 0359    Specimen:  Blood Updated:  " 05/10/19 0514     Phosphorus 2.1 mg/dL     BNP [[id number]]  (Normal) Collected:  05/10/19 0359    Specimen:  Blood Updated:  05/10/19 0506     proBNP 168.6 pg/mL     Narrative:       Among patients with dyspnea, NT-proBNP is highly sensitive for the detection of acute congestive heart failure. In addition NT-proBNP of <300 pg/ml effectively rules out acute congestive heart failure with 99% negative predictive value.    CBC Auto Differential [[id number]]  (Abnormal) Collected:  05/10/19 0359    Specimen:  Blood Updated:  05/10/19 0505     WBC 8.95 10*3/mm3      RBC 2.42 10*6/mm3      Hemoglobin 7.6 g/dL      Hematocrit 21.9 %      MCV 90.5 fL      MCH 31.4 pg      MCHC 34.7 g/dL      RDW 14.5 %      RDW-SD 46.5 fl      MPV 10.8 fL      Platelets 111 10*3/mm3      Neutrophil % 75.9 %      Lymphocyte % 12.1 %      Monocyte % 9.4 %      Eosinophil % 0.3 %      Basophil % 0.3 %      Immature Grans % 2.0 %      Neutrophils, Absolute 6.79 10*3/mm3      Lymphocytes, Absolute 1.08 10*3/mm3      Monocytes, Absolute 0.84 10*3/mm3      Eosinophils, Absolute 0.03 10*3/mm3      Basophils, Absolute 0.03 10*3/mm3      Immature Grans, Absolute 0.18 10*3/mm3      nRBC 0.1 /100 WBC     Magnesium [[id number]]  (Normal) Collected:  05/10/19 0359    Specimen:  Blood Updated:  05/10/19 0505     Magnesium 1.6 mg/dL     POC Glucose Once [[id number]]  (Abnormal) Collected:  05/10/19 0436    Specimen:  Blood Updated:  05/10/19 0448     Glucose 505 mg/dL     Lactic Acid, Plasma [[id number]]  (Abnormal) Collected:  05/10/19 0359    Specimen:  Blood Updated:  05/10/19 0432     Lactate 4.2 mmol/L     POC Glucose Once [[id number]]  (Abnormal) Collected:  05/10/19 0342    Specimen:  Blood Updated:  05/10/19 0354     Glucose 525 mg/dL     POC Glucose Once [[id number]]  (Abnormal) Collected:  05/10/19 0232    Specimen:  Blood Updated:  05/10/19 0243     Glucose 575 mg/dL     POC Glucose Once [[id number]]  (Abnormal) Collected:  05/10/19 0106    Specimen:   Blood Updated:  05/10/19 0122     Glucose 577 mg/dL     POC Glucose Once [[id number]]  (Abnormal) Collected:  05/10/19 0024    Specimen:  Blood Updated:  05/10/19 0025     Glucose 547 mg/dL     POC Glucose Once [[id number]]  (Abnormal) Collected:  05/09/19 2236    Specimen:  Blood Updated:  05/09/19 2238     Glucose >599 mg/dL     Troponin [[id number]]  (Normal) Collected:  05/09/19 2043    Specimen:  Blood Updated:  05/09/19 2207     Troponin T <0.010 ng/mL     Narrative:       Troponin T Reference Range:  <= 0.03 ng/mL-   Negative for AMI  >0.03 ng/mL-     Abnormal for myocardial necrosis.  Clinicians would have to utilize clinical acumen, EKG, Troponin and serial changes to determine if it is an Acute Myocardial Infarction or myocardial injury due to an underlying chronic condition.     Lipase [[id number]]  (Abnormal) Collected:  05/09/19 2043    Specimen:  Blood Updated:  05/09/19 2207     Lipase 8 U/L     Magnesium [[id number]]  (Normal) Collected:  05/09/19 2043    Specimen:  Blood Updated:  05/09/19 2207     Magnesium 1.7 mg/dL     Phosphorus [[id number]]  (Normal) Collected:  05/09/19 2043    Specimen:  Blood Updated:  05/09/19 2207     Phosphorus 3.3 mg/dL     Protime-INR [[id number]]  (Abnormal) Collected:  05/09/19 2133    Specimen:  Blood Updated:  05/09/19 2204     Protime 16.4 Seconds      INR 1.36    aPTT [[id number]]  (Normal) Collected:  05/09/19 2133    Specimen:  Blood Updated:  05/09/19 2204     PTT 28.9 seconds     Occult Blood X 1, Stool - Stool, Per Rectum [[id number]]  (Abnormal) Collected:  05/09/19 2117    Specimen:  Stool from Per Rectum Updated:  05/09/19 2145     Fecal Occult Blood Positive    Beta Hydroxybutyrate Quantitative [[id number]]  (Abnormal) Collected:  05/09/19 2043    Specimen:  Blood Updated:  05/09/19 2136     Beta-Hydroxybutyrate Quant 1.437 mmol/L     Narrative:       In the assessment of possible diabetic ketoacidosis, the test should be interpreted along with other clinical and  laboratory findings.  A level greater than 1 mmol/L should require further evaluation and levels of more than 3 mmol/L require immediate medical review.    Comprehensive Metabolic Panel [[id number]]  (Abnormal) Collected:  05/09/19 2043    Specimen:  Blood Updated:  05/09/19 2136     Glucose 675 mg/dL      BUN 60 mg/dL      Creatinine 1.10 mg/dL      Sodium 125 mmol/L      Potassium 6.1 mmol/L      Chloride 90 mmol/L      CO2 14.6 mmol/L      Calcium 8.3 mg/dL      Total Protein 5.2 g/dL      Albumin 2.80 g/dL      ALT (SGPT) 47 U/L      AST (SGOT) 32 U/L      Comment: Specimen hemolyzed.  Results may be affected.        Alkaline Phosphatase 123 U/L      Total Bilirubin 1.6 mg/dL      eGFR Non African Amer 51 mL/min/1.73      Globulin 2.4 gm/dL      A/G Ratio 1.2 g/dL      BUN/Creatinine Ratio 54.5     Anion Gap 20.4 mmol/L     Narrative:       GFR Normal >60  Chronic Kidney Disease <60  Kidney Failure <15    Blood Gas, Arterial [[id number]]  (Abnormal) Collected:  05/09/19 2132    Specimen:  Arterial Blood Updated:  05/09/19 2134     Site Arterial: left radial     Mayur's Test Positive     pH, Arterial 7.414 pH units      pCO2, Arterial 21.5 mm Hg      pO2, Arterial 96.1 mm Hg      HCO3, Arterial 13.8 mmol/L      Base Excess, Arterial -9.7 mmol/L      O2 Saturation Calculated 97.8 %      A-a Gradiant 0.7 mmHg      Barometric Pressure for Blood Gas 747.1 mmHg      Modality Room Air     FIO2 21 %      Rate 22 Breaths/minute     Urinalysis With Microscopic If Indicated (No Culture) - Urine, Catheter [[id number]]  (Abnormal) Collected:  05/09/19 2119    Specimen:  Urine, Catheter Updated:  05/09/19 2128     Color, UA Yellow     Appearance, UA Clear     pH, UA <=5.0     Specific Gravity, UA >=1.030     Glucose, UA >=1000 mg/dL (3+)     Ketones, UA 15 mg/dL (1+)     Bilirubin, UA Negative     Blood, UA Negative     Protein, UA Negative     Leuk Esterase, UA Negative     Nitrite, UA Negative     Urobilinogen, UA 0.2 E.U./dL     Narrative:       Urine microscopic not indicated.    CBC & Differential [[id number]] Collected:  05/09/19 2043    Specimen:  Blood Updated:  05/09/19 2114    Narrative:       The following orders were created for panel order CBC & Differential.  Procedure                               Abnormality         Status                     ---------                               -----------         ------                     CBC Auto Differential[[id number]]        Abnormal            Final result                 Please view results for these tests on the individual orders.    CBC Auto Differential [[id number]]  (Abnormal) Collected:  05/09/19 2043    Specimen:  Blood Updated:  05/09/19 2114     WBC 13.99 10*3/mm3      RBC 2.49 10*6/mm3      Hemoglobin 7.9 g/dL      Hematocrit 23.0 %      MCV 92.4 fL      MCH 31.7 pg      MCHC 34.3 g/dL      RDW 15.3 %      RDW-SD 50.7 fl      MPV 11.5 fL      Platelets 167 10*3/mm3      Neutrophil % 83.6 %      Lymphocyte % 7.4 %      Monocyte % 6.0 %      Eosinophil % 0.1 %      Basophil % 0.5 %      Immature Grans % 2.4 %      Neutrophils, Absolute 11.69 10*3/mm3      Lymphocytes, Absolute 1.03 10*3/mm3      Monocytes, Absolute 0.84 10*3/mm3      Eosinophils, Absolute 0.02 10*3/mm3      Basophils, Absolute 0.07 10*3/mm3      Immature Grans, Absolute 0.34 10*3/mm3      nRBC 0.1 /100 WBC     Narrative:       Test repeated, results confirmed     Read back and acknowledged by Gita Mason RN at 05/09/19 9:13 PM.    POC Glucose Once [[id number]]  (Abnormal) Collected:  05/09/19 2033    Specimen:  Blood Updated:  05/09/19 2035     Glucose >599 mg/dL           Imaging Results (last 24 hours)     ** No results found for the last 24 hours. **          Assessment/Plan     Active Hospital Problems    Diagnosis  POA   • Acute upper GI bleed [K92.2]  Yes      Resolved Hospital Problems   No resolved problems to display.       Type 2 diabetes mellitus-severely uncontrolled  Okay to transition the  "patient off the insulin drip  Continue Lantus 60 units twice daily  Since patient is currently n.p.o. will cover the patient with moderate dose Humalog sliding scale every 4 hours.  Would consider pre-meal Humalog when patient is started on consistent carbohydrate diet.    We will check thyroid panel and lipid panel.       Discussed plan with nurse.     Thank you for your consultation.  Will follow the pt while in hospital.     Merary Burnett MD.  05/10/19  11:13 AM      EMR Dragon / transcription disclaimer:    \"Dictated utilizing Dragon dictation\".   "

## 2019-05-10 NOTE — PLAN OF CARE
Problem: Patient Care Overview  Goal: Plan of Care Review  Outcome: Ongoing (interventions implemented as appropriate)   05/10/19 0640   Coping/Psychosocial   Plan of Care Reviewed With patient   Plan of Care Review   Progress no change   OTHER   Outcome Summary Pt admitted to ICU with low hemoglobin. Blood glucose initially 675; on insulin gtt and down to mid 300s. 350 UO, 271 bladder scan at end of shift. Lactic 4.1, procal 1.07- Blood cultures drawn per Dr. Martinez. 2u PRBCs given. Pain medication x1. Plan for GI consult this AM. Will CTM       Problem: Skin Injury Risk (Adult)  Goal: Identify Related Risk Factors and Signs and Symptoms  Outcome: Ongoing (interventions implemented as appropriate)    Goal: Skin Health and Integrity  Outcome: Ongoing (interventions implemented as appropriate)      Problem: Pain, Acute (Adult)  Goal: Identify Related Risk Factors and Signs and Symptoms  Outcome: Ongoing (interventions implemented as appropriate)    Goal: Acceptable Pain Control/Comfort Level  Outcome: Ongoing (interventions implemented as appropriate)      Problem: Sepsis/Septic Shock (Adult)  Goal: Signs and Symptoms of Listed Potential Problems Will be Absent, Minimized or Managed (Sepsis/Septic Shock)  Outcome: Ongoing (interventions implemented as appropriate)

## 2019-05-10 NOTE — ANESTHESIA PREPROCEDURE EVALUATION
Anesthesia Evaluation     Patient summary reviewed and Nursing notes reviewed   history of anesthetic complications: PONV  NPO Solid Status: > 8 hours  NPO Liquid Status: > 2 hours           Airway   Mallampati: II  no difficulty expected  Dental - normal exam     Pulmonary     breath sounds clear to auscultation  (+) a smoker Former, sleep apnea on CPAP,   Cardiovascular     ECG reviewed  Rhythm: regular  Rate: normal    (+) hyperlipidemia,       Neuro/Psych  (+) seizures, headaches,     GI/Hepatic/Renal/Endo    (+) obesity,  hiatal hernia, PUD, GI bleeding, hepatitis, liver disease, diabetes mellitus,     ROS Comment: cirrhosis    Musculoskeletal     Abdominal    Substance History      OB/GYN          Other                        Anesthesia Plan    ASA 3     MAC   total IV anesthesia  intravenous induction   Anesthetic plan, all risks, benefits, and alternatives have been provided, discussed and informed consent has been obtained with: patient.

## 2019-05-10 NOTE — PROGRESS NOTES
I have scheduled patient for bedside EGD, probably early afternoon. Full consult to follow.     Rich Coleman MD

## 2019-05-10 NOTE — NURSING NOTE
Per Dr. Vasquez, once blood glucose level is within drip parameters of 111-180 for two checks, proceed to d/c drip and start sliding scale insulin.

## 2019-05-10 NOTE — PLAN OF CARE
"Problem: Fall Risk (Adult)  Goal: Absence of Fall  Outcome: Ongoing (interventions implemented as appropriate)      Problem: Patient Care Overview  Goal: Plan of Care Review  Outcome: Ongoing (interventions implemented as appropriate)   05/10/19 1801   Coping/Psychosocial   Plan of Care Reviewed With patient   Plan of Care Review   Progress no change   OTHER   Outcome Summary Hbg stable after 2 units of blood this am. Pt. VSS throughout day. Frequent c/o abdominal, back, and head pain. MD made aware of persitent pain despite home medication dose, no new meds ordered. EGD completed this afternoon with 4 bands applied to varices. Post-procedure pt. with c/o midsternal chest pain. EKG and troponin normal. GI notified and per MD, \"It's pain from the bands.\". Insulin drip d/c per drip protocol. Full liquid diet started. Continuing to monitor.        Problem: Skin Injury Risk (Adult)  Goal: Identify Related Risk Factors and Signs and Symptoms  Outcome: Ongoing (interventions implemented as appropriate)    Goal: Skin Health and Integrity  Outcome: Ongoing (interventions implemented as appropriate)      Problem: Pain, Acute (Adult)  Goal: Identify Related Risk Factors and Signs and Symptoms  Outcome: Ongoing (interventions implemented as appropriate)    Goal: Acceptable Pain Control/Comfort Level  Outcome: Ongoing (interventions implemented as appropriate)      Problem: Sepsis/Septic Shock (Adult)  Goal: Signs and Symptoms of Listed Potential Problems Will be Absent, Minimized or Managed (Sepsis/Septic Shock)  Outcome: Ongoing (interventions implemented as appropriate)        "

## 2019-05-10 NOTE — H&P
Group: [facility]         Critical care admission note    Patient Identification:  Silvia Zabala  58 y.o.  female  1960  [id number]              CC: Nausea vomiting    History of Present Illness:  58-year-old  female who presents for nausea and vomiting.  Symptoms started 48 hours ago.  Still present.  Have been intermittent but gradually worsening in interval and severity over the past few hours.  She came to the emergency room for this reason.  Nothing alleviates her nausea and vomiting other than IV antiemetics in the emergency room.  Associated with abdominal distention, anorexia and abdominal pain.  She says she had some subjective fever.  She has had coffee-ground emesis.  She is also had black tarry stools.  This is been present for the past 24 hours.  Her blood glucose this morning was 227.  It is currently greater than 600.  I discussed the case with Dr. Medina from the emergency room.    I reviewed old records and summarized below.  I reviewed the discharge summary dictated by Dr. Zavala on January 22, 2019.  At the time the patient had severe hyperglycemia, nonalcoholic steatohepatitis, gastroparesis, type 2 diabetes, hypersplenism and pancytopenia.      Review of Systems   Constitutional: Positive for fatigue and fever. Negative for diaphoresis.   HENT: Negative for ear discharge and sore throat.    Eyes: Negative for pain and visual disturbance.   Respiratory: Negative for cough and shortness of breath.    Cardiovascular: Positive for leg swelling. Negative for chest pain.   Gastrointestinal: Positive for abdominal distention, abdominal pain, nausea and vomiting. Negative for diarrhea.        Coffee-ground emesis and dark tarry stools   Endocrine: Negative for cold intolerance and polyuria.   Genitourinary: Negative for dysuria and hematuria.   Musculoskeletal: Negative for joint swelling and myalgias.   Skin: Negative for rash and wound.   Neurological: Positive for  weakness. Negative for speech difficulty and numbness.   Hematological: Negative for adenopathy. Does not bruise/bleed easily.   Psychiatric/Behavioral: Negative for agitation and confusion.       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   • Cirrhosis (CMS/HCC)    • Colon polyps    • Deafness    • Depression    • Diabetes mellitus (CMS/HCC)     Adult onset, insulin requiring   • Duodenal ulcer with hemorrhage    • Esophageal varices determined by endoscopy (CMS/HCC)    • Fibromyalgia    • Gallbladder disease    • Gastric varices    • Gastroparesis    • Glaucoma    • Granuloma annulare    • H/O B12 deficiency    • H/O Bleeding ulcer     And gastroesophageal varices   • H/O mixed connective tissue disorder    • Hemorrhoids    • Hiatal hernia    • History of transfusion    • Hx of being hospitalized     In [place] for GI bleeding from ulcer and varices   • Hyperlipidemia    • Migraine    • DUKES (nonalcoholic steatohepatitis) 11/2016   • Orthostatic hypotension 02/2019   • BRICE (obstructive sleep apnea)    • Pancreas divisum    • Pancytopenia (CMS/HCC)     Chronic, due to cirrhosis and hypersplenism   • Peptic ulcer disease     And esophageal varices   • PONV (postoperative nausea and vomiting)    • RA (rheumatoid arthritis) (CMS/HCC)    • Seizure disorder (CMS/HCC)     LAST ONE SEVERAL YEARS AGO   • Seizures (CMS/HCC)    • Systemic lupus (CMS/HCC)        Past Surgical History:  Past Surgical History:   Procedure Laterality Date   • BLADDER REPAIR  1991   • CATARACT EXTRACTION     • CHOLECYSTECTOMY  1994   • ENDOSCOPY N/A 11/7/2016    Procedure: ESOPHAGOGASTRODUODENOSCOPY WITH COLD POLYPECTOMY, BANDING,  AND CLIPS TIMES 2;  Surgeon: Rich Coleman MD;  Location: [facility]  "ENDOSCOPY;  Service:    • ENDOSCOPY N/A 12/22/2016    Procedure: ESOPHAGOGASTRODUODENOSCOPY WITH ESOPHAGEAL BANDING. 5 BANDS FIRED, 4 BANDS ADHERERD TO MUCOSA;  Surgeon: Rich Coleman MD;  Location: [facility] ENDOSCOPY;  Service:    • ENDOSCOPY N/A 2/15/2017    Procedure: ESOPHAGOGASTRODUODENOSCOPY AT BEDSIDE with esophageal varices banding (3);  Surgeon: Rich Coleman MD;  [facility];  Service:    • ENDOSCOPY N/A 4/6/2017    Procedure: ESOPHAGOGASTRODUODENOSCOPY WITH ESOPHAGEAL VARICES BANDING x2;  Surgeon: Rich Coleman MD;  [facility];  Service:    • ENDOSCOPY N/A 11/24/2017    Procedure: ESOPHAGOGASTRODUODENOSCOPY with biopsies;  Surgeon: Rich Coleman MD;  Location: [facility] ENDOSCOPY;  Service:    • ENDOSCOPY N/A 10/5/2018    Procedure: ESOPHAGOGASTRODUODENOSCOPY;  Surgeon: Rich Coleman MD;  Location: [facility] ENDOSCOPY;  Service: Gastroenterology   • ENDOSCOPY AND COLONOSCOPY  2014    Dr. Rich Coleman with findings of candida esophagitis   • EYE SURGERY     • HYSTERECTOMY  1986    partial   • JOINT REPLACEMENT     • KNEE SURGERY Right 1995    \"right knee recontstruction\"   • LIVER BIOPSY  01/2015   • MASS EXCISION     • STOMACH SURGERY          Home Meds:  Reviewed and reconciled    Allergies:  Allergies   Allergen Reactions   • Albuterol Anaphylaxis   • Tramadol Nausea Only, Other (See Comments) and GI Intolerance     Per pt causes her \"palsy, meaning shaking and tremors\"    • Lactulose Nausea And Vomiting and Other (See Comments)     Severe abdominal pain   • Quinine Derivatives Nausea And Vomiting       Social History:   Social History     Socioeconomic History   • Marital status:      Spouse name: Jose   • Number of children: Not on file   • Years of education: Not on file   • Highest education level: Not on file   Occupational History     Employer: DISABLED   Tobacco Use   • Smoking status: Former Smoker     Packs/day: 0.00     Years: 0.00     Pack " "years: 0.00     Last attempt to quit: 12/17/2015     Years since quitting: 3.3   • Smokeless tobacco: Never Used   Substance and Sexual Activity   • Alcohol use: No   • Drug use: No   • Sexual activity: Yes     Partners: Male   Social History Narrative    Moved to [place] from [place]. She is currently medically disabled.       Family History:  Family History   Problem Relation Age of Onset   • Liver disease Other    • Depression Other    • Heart disease Other    • Hypertension Other    • Diabetes Other    • Breast cancer Other    • Brain cancer Other    • Uterine cancer Other    • Colon cancer Other    • Leukemia Other    • Colon cancer Maternal Aunt    • Hypertension Mother    • Diabetes type II Mother    • Rheum arthritis Mother    • Liver disease Mother         DUKES   • Heart disease Father    • Diabetes type II Father    • Diabetes type II Sister    • Lupus Sister    • Malig Hyperthermia Neg Hx        Physical Exam:  /67 (BP Location: Left arm, Patient Position: Lying)   Pulse (!) 126   Temp 99.4 °F (37.4 °C) (Oral)   Resp 21   Ht 165.1 cm (65\")   Wt 88.5 kg (195 lb)   SpO2 100%   BMI 32.45 kg/m²  Body mass index is 32.45 kg/m². 100% 88.5 kg (195 lb)  Physical Exam   Constitutional: She is oriented to person, place, and time.   She appears acutely ill, pale   HENT:   Head: Normocephalic.   Mouth/Throat: Oropharynx is clear and moist.   Eyes: EOM are normal. No scleral icterus.   Pale conjunctiva   Neck: No tracheal deviation present. No thyromegaly present.   Cardiovascular: Normal rate, regular rhythm and normal heart sounds.   Pulmonary/Chest: No respiratory distress. She has no wheezes. She exhibits no tenderness.   Abdominal: She exhibits distension. She exhibits no mass. There is tenderness. There is no rebound.   I cannot palpate any liver or spleen enlargement   Musculoskeletal: Normal range of motion. She exhibits no deformity.   Neurological: She is alert and oriented to person, place, " and time. No cranial nerve deficit. She exhibits normal muscle tone.   Skin: Skin is warm. No rash noted. No erythema.   Psychiatric: Thought content normal.   She appears anxious       LABS:  Lab Results   Component Value Date    CALCIUM 8.3 (L) 05/09/2019    PHOS 3.3 05/09/2019     Results from last 7 days   Lab Units 05/09/19 2043 05/06/19  1106   MAGNESIUM mg/dL 1.7  --    SODIUM mmol/L 125* 140   POTASSIUM mmol/L 6.1* 4.1   CHLORIDE mmol/L 90* 101   TOTAL CO2 mmol/L  --  26.7   CO2 mmol/L 14.6*  --    BUN mg/dL 60* 17   CREATININE mg/dL 1.10* 0.96   GLUCOSE mg/dL 675*  --    CALCIUM mg/dL 8.3* 9.8   WBC 10*3/mm3 13.99*  --    HEMOGLOBIN g/dL 7.9*  --    PLATELETS 10*3/mm3 167  --    ALT (SGPT) U/L 47*  --    AST (SGOT) U/L 32  --      Lab Results   Component Value Date    CKTOTAL 83 02/16/2017    CKMB 1.34 02/16/2017    TROPONINT <0.010 05/09/2019     Results from last 7 days   Lab Units 05/09/19 2043   TROPONIN T ng/mL <0.010             Results from last 7 days   Lab Units 05/09/19  2132   PH, ARTERIAL pH units 7.414   PCO2, ARTERIAL mm Hg 21.5*   PO2 ART mm Hg 96.1   MODALITY  Room Air   O2 SATURATION CALC % 97.8         Results from last 7 days   Lab Units 05/09/19  2133   INR  1.36*         Lab Results   Component Value Date    TSH 5.100 (H) 05/06/2019     Estimated Creatinine Clearance: 62.7 mL/min (A) (by C-G formula based on SCr of 1.1 mg/dL (H)).  Results from last 7 days   Lab Units 05/09/19  2119   NITRITE UA  Negative        Imaging: No images available for review during this admission.  Old images and report reviewed.  She has hypersplenism and changes in the liver consistent with steatohepatitis.        Assessment:  Acute GI bleed  Esophageal varices  Nonalcoholic steatohepatitis  Acute blood loss anemia  Hyponatremia  Hyperkalemia  Acute kidney injury  Diabetes mellitus type 2 on insulin  Severe hyperglycemia  Elevated liver enzymes  Acute on chronic anemia      Recommendations:  The patient is  critically ill.  She will be admitted to the intensive care unit.  Keep nothing by mouth.  Consult gastroenterology.  Start IV Protonix drip and octreotide drip.  Source of bleeding is most likely esophageal varices.  She has a history of upper endoscopic banding by Dr. Rich Coleman.  We will transfuse packed red cells to keep hemoglobin above 7 g/dL.  Monitor CBC and basic chemistries frequently for the next 24 hours.  She has hyponatremia and hyperkalemia.  We will give large volume IV fluid resuscitation and repeat electrolytes soon.  If these electrolytes fail to improve, I will consult nephrology.  We will start insulin drip to strictly control her severe hyperglycemia.  Currently her blood glucose is greater than 600 g/dL.  Monitor urinary output closely.  IV insulin will also lower her potassium.  Avoid any anticoagulants, NSAIDs or antiplatelets.  Mechanical compression devices to the legs for DVT prophylaxis.    Total critical care time 40 minutes, excluding any separately billable procedure time          Juan Martinez MD  5/10/2019  12:23 AM      Much of this encounter note is an electronic transcription/translation of spoken language to printed text using Dragon Software.

## 2019-05-10 NOTE — PROGRESS NOTES
Brief (courtesy) procedure note:    Procedure: EGD    Pre-op diagnosis: GI bleed    Post-op diagnosis: Esophageal varices with 2 red rita signs, banded times 4. Technically a bit difficult.    Recommendations: Continue octreotide/pantoprazole drips. Full liquid diet.     Please refer to the Provation-generated note for photos and further details.     Rich Coleman MD

## 2019-05-10 NOTE — ANESTHESIA POSTPROCEDURE EVALUATION
"Patient: Silvia Zabala    Procedure Summary     Date:  05/10/19 Room / Location:  [facility] ENDOSCOPY 1 / [facility] ENDOSCOPY    Anesthesia Start:  1516 Anesthesia Stop:  1545    Procedure:  ESOPHAGOGASTRODUODENOSCOPY AT BEDSIDE (N/A Esophagus) Diagnosis:       Acute upper GI bleed      (Acute upper GI bleed [K92.2])    Surgeon:  Rich Coleman MD Provider:  Jos Lawson MD    Anesthesia Type:  MAC ASA Status:  3          Anesthesia Type: MAC  Last vitals  BP   117/75 (05/10/19 1303)   Temp   36.8 °C (98.3 °F) (05/10/19 1135)   Pulse   112 (05/10/19 1303)   Resp   16 (05/10/19 0652)     SpO2   99 % (05/10/19 1303)     Post Anesthesia Care and Evaluation    Patient location during evaluation: bedside  Patient participation: complete - patient participated  Level of consciousness: awake  Pain score: 1  Pain management: adequate  Airway patency: patent  Anesthetic complications: No anesthetic complications    Cardiovascular status: acceptable  Respiratory status: acceptable  Hydration status: acceptable    Comments: /75   Pulse 112   Temp 36.8 °C (98.3 °F) (Oral)   Resp 16   Ht 167.6 cm (66\")   Wt 87.9 kg (193 lb 12.6 oz)   SpO2 99%   BMI 31.28 kg/m²         "

## 2019-05-10 NOTE — ED PROVIDER NOTES
EMERGENCY DEPARTMENT ENCOUNTER    Room Number:  22/22  Date seen:  5/9/2019  Time seen: 8:47 PM  PCP: Kelly Flores APRN  Historian: patient      HPI:  Chief Complaint: N/V    Context: Silvia Zabala is a 58 y.o. female who presents to the ED c/o N/V for the last 2 days. Pt states that she developed diarrhea earlier today. Pt also complains of abd distention, decreased appetite and subjective fever. Pt states that the last time she took her insulin was yesterday morning because she has not ingested anything other than propel since that time. Pt states that her blood glucose was 227 this morning.    PAST MEDICAL HISTORY  Active Ambulatory Problems     Diagnosis Date Noted   • Cirrhosis of liver (CMS/HCC) 03/08/2016   • Rheumatoid arthritis (CMS/HCC) 03/08/2016   • Anxiety and depression 03/08/2016   • Type 2 diabetes mellitus, uncontrolled, with neuropathy (CMS/HCC) 03/15/2016   • Fibrositis 03/15/2016   • Change in blood platelet count 03/15/2016   • Pancytopenia (CMS/HCC) 04/12/2016   • Hypersplenism 04/12/2016   • Nausea & vomiting 06/14/2016   • DUKES (nonalcoholic steatohepatitis) 06/14/2016   • Hyperlipidemia    • Abdominal pain 02/14/2017   • Hematemesis 02/15/2017   • Ascites 02/21/2017   • Portal hypertension (CMS/HCC) 02/22/2017   • Systemic lupus (CMS/HCC) 02/22/2017   • Secondary esophageal varices without bleeding (CMS/HCC) 02/22/2017   • Gastroparesis 10/17/2017   • Cirrhosis of liver with ascites (CMS/HCC) 11/17/2017   • Diabetic ketoacidosis without coma associated with type 2 diabetes mellitus (CMS/HCC) 12/16/2017   • Diabetic peripheral neuropathy (CMS/HCC) 12/17/2017   • History of seizure disorder 12/17/2017   • Hepatic encephalopathy (CMS/HCC) 05/08/2018   • Abnormal finding of blood chemistry  05/08/2018   • Thrombocytopenia (CMS/HCC) 05/16/2018   • Fever 05/17/2018   • Acute ITP (CMS/HCC) 05/18/2018   • Degeneration of lumbosacral intervertebral disc 05/30/2018   • Seizure (CMS/HCC)  05/30/2018   • Spondylosis without myelopathy 05/30/2018   • Intractable vomiting with nausea 10/03/2018   • UGI bleed 10/04/2018   • Migraine without aura 10/11/2018   • Urinary retention 10/11/2018   • Type 2 diabetes mellitus with hyperglycemia, with long-term current use of insulin (CMS/HCC) 12/13/2018   • BRICE (obstructive sleep apnea) 12/13/2018   • Gastroparesis 12/13/2018   • Hyperammonemia (CMS/HCC) 12/13/2018   • Hyponatremia 12/13/2018   • Anemia 12/13/2018   • Vitamin D insufficiency 12/16/2018   • Hyperglycemia 01/17/2019   • Dehydration 01/17/2019   • Iron deficiency anemia 01/22/2019   • Adverse effect of iron or its compound, subsequent encounter 01/22/2019   • Hemorrhage of anus and rectum 02/15/2019   • Vulvar lesion 05/09/2019     Resolved Ambulatory Problems     Diagnosis Date Noted   • Secondary esophageal varices with bleeding (CMS/HCC) 03/07/2017   • Upper GI bleed 04/25/2017     Past Medical History:   Diagnosis Date   • Anemia    • Anxiety    • Arthritis    • Broken shoulder    • Chromosomal abnormality    • Cirrhosis (CMS/HCC)    • Colon polyps    • Deafness    • Depression    • Diabetes mellitus (CMS/HCC)    • Duodenal ulcer with hemorrhage    • Esophageal varices determined by endoscopy (CMS/HCC)    • Fibromyalgia    • Gallbladder disease    • Gastric varices    • Gastroparesis    • Glaucoma    • Granuloma annulare    • H/O B12 deficiency    • H/O Bleeding ulcer    • H/O mixed connective tissue disorder    • Hemorrhoids    • Hiatal hernia    • History of transfusion    • Hx of being hospitalized    • Hyperlipidemia    • Migraine    • DUKES (nonalcoholic steatohepatitis) 11/2016   • Orthostatic hypotension 02/2019   • BRICE (obstructive sleep apnea)    • Pancreas divisum    • Pancytopenia (CMS/HCC)    • Peptic ulcer disease    • PONV (postoperative nausea and vomiting)    • RA (rheumatoid arthritis) (CMS/HCC)    • Seizure disorder (CMS/HCC)    • Seizures (CMS/HCC)    • Systemic lupus (CMS/HCC)  "         PAST SURGICAL HISTORY  Past Surgical History:   Procedure Laterality Date   • BLADDER REPAIR  1991   • CATARACT EXTRACTION     • CHOLECYSTECTOMY  1994   • ENDOSCOPY N/A 11/7/2016    Procedure: ESOPHAGOGASTRODUODENOSCOPY WITH COLD POLYPECTOMY, BANDING,  AND CLIPS TIMES 2;  Surgeon: Rich Coleman MD;  Location: [facility] ENDOSCOPY;  Service:    • ENDOSCOPY N/A 12/22/2016    Procedure: ESOPHAGOGASTRODUODENOSCOPY WITH ESOPHAGEAL BANDING. 5 BANDS FIRED, 4 BANDS ADHERERD TO MUCOSA;  Surgeon: Rich Coleman MD;  Location: [facility] ENDOSCOPY;  Service:    • ENDOSCOPY N/A 2/15/2017    Procedure: ESOPHAGOGASTRODUODENOSCOPY AT BEDSIDE with esophageal varices banding (3);  Surgeon: Rich Coleman MD;  Location: [facility] ENDOSCOPY;  Service:    • ENDOSCOPY N/A 4/6/2017    Procedure: ESOPHAGOGASTRODUODENOSCOPY WITH ESOPHAGEAL VARICES BANDING x2;  Surgeon: Rich Coleman MD;  Location: [facility] ENDOSCOPY;  Service:    • ENDOSCOPY N/A 11/24/2017    Procedure: ESOPHAGOGASTRODUODENOSCOPY with biopsies;  Surgeon: Rich Coleman MD;  Location: [facility] ENDOSCOPY;  Service:    • ENDOSCOPY N/A 10/5/2018    Procedure: ESOPHAGOGASTRODUODENOSCOPY;  Surgeon: Rich Coleman MD;  Location: [facility] ENDOSCOPY;  Service: Gastroenterology   • ENDOSCOPY AND COLONOSCOPY  2014    Dr. Rich Coleman with findings of candida esophagitis   • EYE SURGERY     • HYSTERECTOMY  1986    partial   • JOINT REPLACEMENT     • KNEE SURGERY Right 1995    \"right knee recontstruction\"   • LIVER BIOPSY  01/2015   • MASS EXCISION     • STOMACH SURGERY           FAMILY HISTORY  Family History   Problem Relation Age of Onset   • Liver disease Other    • Depression Other    • Heart disease Other    • Hypertension Other    • Diabetes Other    • Breast cancer Other    • Brain cancer Other    • Uterine cancer Other    • Colon cancer Other    • Leukemia Other    • Colon cancer Maternal Aunt    • Hypertension Mother    • Diabetes type II Mother    • Rheum " arthritis Mother    • Liver disease Mother         DUKES   • Heart disease Father    • Diabetes type II Father    • Diabetes type II Sister    • Lupus Sister    • Malig Hyperthermia Neg Hx          SOCIAL HISTORY  Social History     Socioeconomic History   • Marital status:      Spouse name: Jose   • Number of children: Not on file   • Years of education: Not on file   • Highest education level: Not on file   Occupational History     Employer: DISABLED   Tobacco Use   • Smoking status: Former Smoker     Packs/day: 0.00     Years: 0.00     Pack years: 0.00     Last attempt to quit: 12/17/2015     Years since quitting: 3.3   • Smokeless tobacco: Never Used   Substance and Sexual Activity   • Alcohol use: No   • Drug use: No   • Sexual activity: Yes     Partners: Male   Social History Narrative    Moved to [place] from [place]. She is currently medically disabled.         ALLERGIES  Albuterol; Tramadol; Lactulose; and Quinine derivatives        REVIEW OF SYSTEMS  Review of Systems   Constitutional: Positive for appetite change (decreased) and fever (subjective).   HENT: Negative for sore throat.    Eyes: Negative.    Respiratory: Negative for cough and shortness of breath.    Cardiovascular: Negative for chest pain.   Gastrointestinal: Positive for abdominal distention, diarrhea, nausea and vomiting. Negative for abdominal pain.   Genitourinary: Negative for dysuria.   Musculoskeletal: Negative for neck pain.   Skin: Negative for rash.   Neurological: Negative for weakness, numbness and headaches.   Hematological: Negative.    Psychiatric/Behavioral: Negative.    All other systems reviewed and are negative.           PHYSICAL EXAM    GENERAL: moderately distressed  HENT: nares patent, dried, dark emesis on chin, mucous membranes dry  EYES: no scleral icterus, PERRL  CV: regular rhythm, tachycardic  RESPIRATORY: moderate respiratory distress, tachypneic, CTAB  ABDOMEN: soft, generalized abdominal tenderness,  mild distention, scattered bruising to abdomen from injections   MUSCULOSKELETAL: no deformity, no edema  NEURO: alert, MARQUEZ, FC  SKIN: cool, dry, pale and waxy skin    Vital signs and nursing notes reviewed.          LAB RESULTS  Recent Results (from the past 24 hour(s))   POC Glucose Once    Collection Time: 05/09/19  8:33 PM   Result Value Ref Range    Glucose >599 (C) 70 - 130 mg/dL   Comprehensive Metabolic Panel    Collection Time: 05/09/19  8:43 PM   Result Value Ref Range    Glucose 675 (C) 65 - 99 mg/dL    BUN 60 (H) 6 - 20 mg/dL    Creatinine 1.10 (H) 0.57 - 1.00 mg/dL    Sodium 125 (L) 136 - 145 mmol/L    Potassium 6.1 (C) 3.5 - 5.2 mmol/L    Chloride 90 (L) 98 - 107 mmol/L    CO2 14.6 (L) 22.0 - 29.0 mmol/L    Calcium 8.3 (L) 8.6 - 10.5 mg/dL    Total Protein 5.2 (L) 6.0 - 8.5 g/dL    Albumin 2.80 (L) 3.50 - 5.20 g/dL    ALT (SGPT) 47 (H) 1 - 33 U/L    AST (SGOT) 32 1 - 32 U/L    Alkaline Phosphatase 123 (H) 39 - 117 U/L    Total Bilirubin 1.6 (H) 0.2 - 1.2 mg/dL    eGFR Non African Amer 51 (L) >60 mL/min/1.73    Globulin 2.4 gm/dL    A/G Ratio 1.2 g/dL    BUN/Creatinine Ratio 54.5 (H) 7.0 - 25.0    Anion Gap 20.4 mmol/L   Beta Hydroxybutyrate Quantitative    Collection Time: 05/09/19  8:43 PM   Result Value Ref Range    Beta-Hydroxybutyrate Quant 1.437 (H) 0.020 - 0.270 mmol/L   CBC Auto Differential    Collection Time: 05/09/19  8:43 PM   Result Value Ref Range    WBC 13.99 (H) 3.40 - 10.80 10*3/mm3    RBC 2.49 (L) 3.77 - 5.28 10*6/mm3    Hemoglobin 7.9 (L) 12.0 - 15.9 g/dL    Hematocrit 23.0 (L) 34.0 - 46.6 %    MCV 92.4 79.0 - 97.0 fL    MCH 31.7 26.6 - 33.0 pg    MCHC 34.3 31.5 - 35.7 g/dL    RDW 15.3 12.3 - 15.4 %    RDW-SD 50.7 37.0 - 54.0 fl    MPV 11.5 6.0 - 12.0 fL    Platelets 167 140 - 450 10*3/mm3    Neutrophil % 83.6 (H) 42.7 - 76.0 %    Lymphocyte % 7.4 (L) 19.6 - 45.3 %    Monocyte % 6.0 5.0 - 12.0 %    Eosinophil % 0.1 (L) 0.3 - 6.2 %    Basophil % 0.5 0.0 - 1.5 %    Immature Grans % 2.4  (H) 0.0 - 0.5 %    Neutrophils, Absolute 11.69 (H) 1.70 - 7.00 10*3/mm3    Lymphocytes, Absolute 1.03 0.70 - 3.10 10*3/mm3    Monocytes, Absolute 0.84 0.10 - 0.90 10*3/mm3    Eosinophils, Absolute 0.02 0.00 - 0.40 10*3/mm3    Basophils, Absolute 0.07 0.00 - 0.20 10*3/mm3    Immature Grans, Absolute 0.34 (H) 0.00 - 0.05 10*3/mm3    nRBC 0.1 0.0 - 0.2 /100 WBC   Occult Blood X 1, Stool - Stool, Per Rectum    Collection Time: 05/09/19  9:17 PM   Result Value Ref Range    Fecal Occult Blood Positive (A) Negative   Urinalysis With Microscopic If Indicated (No Culture) - Urine, Catheter    Collection Time: 05/09/19  9:19 PM   Result Value Ref Range    Color, UA Yellow Yellow, Straw    Appearance, UA Clear Clear    pH, UA <=5.0 5.0 - 8.0    Specific Gravity, UA >=1.030 1.005 - 1.030    Glucose, UA >=1000 mg/dL (3+) (A) Negative    Ketones, UA 15 mg/dL (1+) (A) Negative    Bilirubin, UA Negative Negative    Blood, UA Negative Negative    Protein, UA Negative Negative    Leuk Esterase, UA Negative Negative    Nitrite, UA Negative Negative    Urobilinogen, UA 0.2 E.U./dL 0.2 - 1.0 E.U./dL   Blood Gas, Arterial    Collection Time: 05/09/19  9:32 PM   Result Value Ref Range    Site Arterial: left radial     Mayur's Test Positive     pH, Arterial 7.414 7.350 - 7.450 pH units    pCO2, Arterial 21.5 (L) 35.0 - 45.0 mm Hg    pO2, Arterial 96.1 80.0 - 100.0 mm Hg    HCO3, Arterial 13.8 (L) 22.0 - 28.0 mmol/L    Base Excess, Arterial -9.7 (L) 0.0 - 2.0 mmol/L    O2 Saturation Calculated 97.8 92.0 - 99.0 %    A-a Gradiant 0.7 mmHg    Barometric Pressure for Blood Gas 747.1 mmHg    Modality Room Air     FIO2 21 %    Rate 22 Breaths/minute       Ordered the above labs and reviewed the results.      PROCEDURES  Critical Care  Performed by: Reynaldo Medina MD  Authorized by: Reynaldo Medina MD     Critical care provider statement:     Critical care time (minutes):  35    Critical care time was exclusive of:  Separately billable procedures  and treating other patients    Critical care was necessary to treat or prevent imminent or life-threatening deterioration of the following conditions:  Circulatory failure and metabolic crisis    Critical care was time spent personally by me on the following activities:  Development of treatment plan with patient or surrogate, discussions with consultants, evaluation of patient's response to treatment, examination of patient, obtaining history from patient or surrogate, ordering and performing treatments and interventions, ordering and review of laboratory studies and pulse oximetry          EKG:           EKG time: 2131  Rhythm/Rate: sinus tachycardia, 117  P waves and FL: normal  QRS, axis: RBBB   ST and T waves: non-specific ST and T wave changes    Interpreted Contemporaneously by me, independently viewed  changed compared to prior on 1/17/19 (RBBB is new)        MEDICATIONS GIVEN IN ER  Medications   sodium chloride 0.9 % bolus 1,000 mL (1,000 mL Intravenous New Bag 5/9/19 2145)   pantoprazole (PROTONIX) injection 80 mg (not administered)   pantoprazole (PROTONIX) 40 mg/100 mL (0.4 mg/mL) in 0.9% NS IVPB (not administered)   octreotide (sandoSTATIN) 500 mcg in sodium chloride 0.9 % 100 mL (5 mcg/mL) infusion (not administered)   sodium chloride 0.9 % bolus 1,000 mL (0 mL Intravenous Stopped 5/9/19 2143)   ondansetron (ZOFRAN) injection 4 mg (4 mg Intravenous Given 5/9/19 2041)   insulin regular (humuLIN R,novoLIN R) injection 15 Units (15 Units Intravenous Given 5/9/19 2127)   promethazine (PHENERGAN) injection 12.5 mg (12.5 mg Intravenous Given 5/9/19 2127)       PROGRESS AND CONSULTS      2103- Discussed the plan to order lab work for further evaluation. Pt understands and agrees with the plan, all questions answered.    2105- Ordered troponin, ABG, phosphorus, magnesium, EKG for further evaluation. Ordered insulin for hyperglycemia and IVF for hydration.    2111- Ordered phenergan for nausea    2128- Per RN,  the pt has black oily stool. Ordered protonix for GI bleed.    2150- Ordered octreotide for GI bleed. Placed call to pulmonology for admission.    2152- Rechecked pt. Pt states that her nausea has improved after phenergan.  Notified pt of her lab results, including her elevated blood glucose, anemia and hyperkalemia. Discussed the plan to admit the pt to the ICU. Pt and family agree with the plan and all questions were addressed.    2157- Discussed the pt's case with Dr. Martinez (pulmonology), who agrees to admit the pt to the ICU.    2201- Ordered 2 units of PRBCs for anemia.    MEDICAL DECISION MAKING  MDM  Number of Diagnoses or Management Options  Acute blood loss anemia:   Acute upper GI bleed:   Uncontrolled type 2 diabetes mellitus with hyperglycemia (CMS/HCC):      Amount and/or Complexity of Data Reviewed  Clinical lab tests: ordered and reviewed (Hgb=7.9)  Tests in the medicine section of CPT®: ordered and reviewed (See EKG procedure note)  Decide to obtain previous medical records or to obtain history from someone other than the patient: yes  Review and summarize past medical records: yes (Pt's hemoglobin was 12.2 ten days ago. Pt had an EGD in February 2019 that showed erosive gastritis and small, grade I esophageal varices. Pt's colonoscopy from February 2019 that showed early rectal varices.)  Discuss the patient with other providers: yes (Dr. Martinez (pulmonology))  Independent visualization of images, tracings, or specimens: yes    Critical Care  Total time providing critical care: 30-74 minutes             DIAGNOSIS  Final diagnoses:   Acute upper GI bleed   Acute blood loss anemia   Uncontrolled type 2 diabetes mellitus with hyperglycemia (CMS/HCC)         DISPOSITION  ADMISSION    Discussed treatment plan and reason for admission with pt/family and admitting physician.  Pt/family voiced understanding of the plan for admission for further testing/treatment as needed.               --  Documentation  assistance provided by tori Anderson for Dr. Adam MD.  Information recorded by the scribe was done at my direction and has been verified and validated by me.     Keesha Anderson  05/09/19 [id number]       Reynaldo Medina MD  05/11/19 6542

## 2019-05-11 LAB
ANISOCYTOSIS BLD QL: ABNORMAL
BASOPHILS # BLD MANUAL: 0.11 10*3/MM3 (ref 0–0.2)
BASOPHILS NFR BLD AUTO: 1 % (ref 0–1.5)
CHOLEST SERPL-MCNC: 133 MG/DL (ref 0–200)
DEPRECATED RDW RBC AUTO: 60.6 FL (ref 37–54)
EOSINOPHIL # BLD MANUAL: 0.75 10*3/MM3 (ref 0–0.4)
EOSINOPHIL NFR BLD MANUAL: 7 % (ref 0.3–6.2)
ERYTHROCYTE [DISTWIDTH] IN BLOOD BY AUTOMATED COUNT: 18.4 % (ref 12.3–15.4)
GLUCOSE BLDC GLUCOMTR-MCNC: 220 MG/DL (ref 70–130)
GLUCOSE BLDC GLUCOMTR-MCNC: 221 MG/DL (ref 70–130)
GLUCOSE BLDC GLUCOMTR-MCNC: 227 MG/DL (ref 70–130)
GLUCOSE BLDC GLUCOMTR-MCNC: 240 MG/DL (ref 70–130)
GLUCOSE BLDC GLUCOMTR-MCNC: 244 MG/DL (ref 70–130)
GLUCOSE BLDC GLUCOMTR-MCNC: 297 MG/DL (ref 70–130)
HCT VFR BLD AUTO: 24.9 % (ref 34–46.6)
HDLC SERPL-MCNC: 34 MG/DL (ref 40–60)
HGB BLD-MCNC: 8.1 G/DL (ref 12–15.9)
LDLC SERPL CALC-MCNC: 67 MG/DL (ref 0–100)
LDLC/HDLC SERPL: 1.98 {RATIO}
LYMPHOCYTES # BLD MANUAL: 0.65 10*3/MM3 (ref 0.7–3.1)
LYMPHOCYTES NFR BLD MANUAL: 2 % (ref 5–12)
LYMPHOCYTES NFR BLD MANUAL: 6 % (ref 19.6–45.3)
MCH RBC QN AUTO: 29.8 PG (ref 26.6–33)
MCHC RBC AUTO-ENTMCNC: 32.5 G/DL (ref 31.5–35.7)
MCV RBC AUTO: 91.5 FL (ref 79–97)
MONOCYTES # BLD AUTO: 0.22 10*3/MM3 (ref 0.1–0.9)
MYELOCYTES NFR BLD MANUAL: 2 % (ref 0–0)
NEUTROPHILS # BLD AUTO: 8.82 10*3/MM3 (ref 1.7–7)
NEUTROPHILS NFR BLD MANUAL: 82 % (ref 42.7–76)
OVALOCYTES BLD QL SMEAR: ABNORMAL
PLAT MORPH BLD: NORMAL
PLATELET # BLD AUTO: 135 10*3/MM3 (ref 140–450)
PMV BLD AUTO: 10 FL (ref 6–12)
POLYCHROMASIA BLD QL SMEAR: ABNORMAL
RBC # BLD AUTO: 2.72 10*6/MM3 (ref 3.77–5.28)
SPHEROCYTES BLD QL SMEAR: ABNORMAL
T4 FREE SERPL-MCNC: 0.78 NG/DL (ref 0.93–1.7)
TRIGL SERPL-MCNC: 159 MG/DL (ref 0–150)
TSH SERPL DL<=0.05 MIU/L-ACNC: 1.57 MIU/ML (ref 0.27–4.2)
VLDLC SERPL-MCNC: 31.8 MG/DL (ref 5–40)
WBC MORPH BLD: NORMAL
WBC NRBC COR # BLD: 10.76 10*3/MM3 (ref 3.4–10.8)

## 2019-05-11 PROCEDURE — 82962 GLUCOSE BLOOD TEST: CPT

## 2019-05-11 PROCEDURE — 99232 SBSQ HOSP IP/OBS MODERATE 35: CPT | Performed by: INTERNAL MEDICINE

## 2019-05-11 PROCEDURE — 85025 COMPLETE CBC W/AUTO DIFF WBC: CPT | Performed by: INTERNAL MEDICINE

## 2019-05-11 PROCEDURE — 84443 ASSAY THYROID STIM HORMONE: CPT | Performed by: INTERNAL MEDICINE

## 2019-05-11 PROCEDURE — 84439 ASSAY OF FREE THYROXINE: CPT | Performed by: INTERNAL MEDICINE

## 2019-05-11 PROCEDURE — 25010000002 OCTREOTIDE PER 25 MCG: Performed by: EMERGENCY MEDICINE

## 2019-05-11 PROCEDURE — 63710000001 INSULIN LISPRO (HUMAN) PER 5 UNITS: Performed by: INTERNAL MEDICINE

## 2019-05-11 PROCEDURE — 80061 LIPID PANEL: CPT | Performed by: INTERNAL MEDICINE

## 2019-05-11 PROCEDURE — 63710000001 INSULIN GLARGINE PER 5 UNITS: Performed by: INTERNAL MEDICINE

## 2019-05-11 PROCEDURE — 85007 BL SMEAR W/DIFF WBC COUNT: CPT | Performed by: INTERNAL MEDICINE

## 2019-05-11 PROCEDURE — 25010000002 PROMETHAZINE PER 50 MG: Performed by: INTERNAL MEDICINE

## 2019-05-11 RX ORDER — HYDROXYZINE HYDROCHLORIDE 25 MG/1
25 TABLET, FILM COATED ORAL 3 TIMES DAILY PRN
Status: DISCONTINUED | OUTPATIENT
Start: 2019-05-11 | End: 2019-05-12

## 2019-05-11 RX ADMIN — INSULIN LISPRO 4 UNITS: 100 INJECTION, SOLUTION INTRAVENOUS; SUBCUTANEOUS at 11:26

## 2019-05-11 RX ADMIN — SODIUM CHLORIDE, PRESERVATIVE FREE 3 ML: 5 INJECTION INTRAVENOUS at 09:26

## 2019-05-11 RX ADMIN — PREGABALIN 75 MG: 75 CAPSULE ORAL at 08:33

## 2019-05-11 RX ADMIN — OCTREOTIDE ACETATE 25 MCG/HR: 500 INJECTION, SOLUTION INTRAVENOUS; SUBCUTANEOUS at 15:31

## 2019-05-11 RX ADMIN — PREGABALIN 75 MG: 75 CAPSULE ORAL at 21:18

## 2019-05-11 RX ADMIN — INSULIN LISPRO 4 UNITS: 100 INJECTION, SOLUTION INTRAVENOUS; SUBCUTANEOUS at 08:33

## 2019-05-11 RX ADMIN — SODIUM CHLORIDE 8 MG/HR: 900 INJECTION INTRAVENOUS at 19:42

## 2019-05-11 RX ADMIN — SODIUM CHLORIDE 8 MG/HR: 900 INJECTION INTRAVENOUS at 03:34

## 2019-05-11 RX ADMIN — SODIUM CHLORIDE 8 MG/HR: 900 INJECTION INTRAVENOUS at 14:12

## 2019-05-11 RX ADMIN — INSULIN GLARGINE 60 UNITS: 100 INJECTION, SOLUTION SUBCUTANEOUS at 08:33

## 2019-05-11 RX ADMIN — OXYCODONE HYDROCHLORIDE 5 MG: 5 TABLET ORAL at 01:40

## 2019-05-11 RX ADMIN — INSULIN LISPRO 6 UNITS: 100 INJECTION, SOLUTION INTRAVENOUS; SUBCUTANEOUS at 17:40

## 2019-05-11 RX ADMIN — PROMETHAZINE HYDROCHLORIDE 25 MG: 25 INJECTION, SOLUTION INTRAMUSCULAR; INTRAVENOUS at 19:53

## 2019-05-11 RX ADMIN — LEVETIRACETAM 500 MG: 500 TABLET, FILM COATED ORAL at 08:33

## 2019-05-11 RX ADMIN — INSULIN LISPRO 6 UNITS: 100 INJECTION, SOLUTION INTRAVENOUS; SUBCUTANEOUS at 04:14

## 2019-05-11 RX ADMIN — LEVETIRACETAM 500 MG: 500 TABLET, FILM COATED ORAL at 21:18

## 2019-05-11 RX ADMIN — SODIUM CHLORIDE, PRESERVATIVE FREE 3 ML: 5 INJECTION INTRAVENOUS at 21:38

## 2019-05-11 RX ADMIN — INSULIN LISPRO 4 UNITS: 100 INJECTION, SOLUTION INTRAVENOUS; SUBCUTANEOUS at 21:18

## 2019-05-11 RX ADMIN — INSULIN LISPRO 4 UNITS: 100 INJECTION, SOLUTION INTRAVENOUS; SUBCUTANEOUS at 17:40

## 2019-05-11 RX ADMIN — PROMETHAZINE HYDROCHLORIDE 25 MG: 25 INJECTION, SOLUTION INTRAMUSCULAR; INTRAVENOUS at 10:19

## 2019-05-11 RX ADMIN — OXYCODONE HYDROCHLORIDE 5 MG: 5 TABLET ORAL at 19:53

## 2019-05-11 RX ADMIN — SODIUM CHLORIDE 8 MG/HR: 900 INJECTION INTRAVENOUS at 09:26

## 2019-05-11 RX ADMIN — INSULIN GLARGINE 60 UNITS: 100 INJECTION, SOLUTION SUBCUTANEOUS at 21:18

## 2019-05-11 RX ADMIN — PROMETHAZINE HYDROCHLORIDE 25 MG: 25 INJECTION, SOLUTION INTRAMUSCULAR; INTRAVENOUS at 01:39

## 2019-05-11 NOTE — PROGRESS NOTES
[facility]  Inpatient Progress Note E esophageal variceal bleed    Reason for Follow Up:      Subjective     Interval History:   No further bleeding today    Current Facility-Administered Medications:   •  ALPRAZolam (XANAX) tablet 0.5 mg, 0.5 mg, Oral, BID PRN, Juan Martinez MD  •  dextrose (D50W) 25 g/ 50mL Intravenous Solution 25-50 mL, 25-50 mL, Intravenous, Q30 Min PRN, Juan Martinez MD  •  insulin glargine (LANTUS) injection 60 Units, 60 Units, Subcutaneous, BID, Merary [place] MD, 60 Units at 05/11/19 0833  •  insulin lispro (humaLOG) injection 0-10 Units, 0-10 Units, Subcutaneous, 4x Daily With Meals & Nightly, Merary [place] MD  •  insulin lispro (humaLOG) injection 6 Units, 6 Units, Subcutaneous, TID With Meals, Merary [place] MD  •  levETIRAcetam (KEPPRA) tablet 500 mg, 500 mg, Oral, BID, Juan Martinez MD, 500 mg at 05/11/19 0833  •  Magnesium Sulfate 2 gram infusion - Mg less than or equal to 1.5 mg/dL, 2 g, Intravenous, PRN **OR** Magnesium Sulfate 1 gram infusion - Mg 1.6-1.9 mg/dL, 1 g, Intravenous, PRN, Juan Martinez MD  •  octreotide (sandoSTATIN) 500 mcg in sodium chloride 0.9 % 100 mL (5 mcg/mL) infusion, 25 mcg/hr, Intravenous, Continuous, Reynaldo Medina MD, Last Rate: 5 mL/hr at 05/10/19 1713, 25 mcg/hr at 05/10/19 1713  •  ondansetron (ZOFRAN) tablet 4 mg, 4 mg, Oral, Q6H PRN **OR** ondansetron (ZOFRAN) injection 4 mg, 4 mg, Intravenous, Q6H PRN, Juan Martinez MD  •  oxyCODONE (ROXICODONE) immediate release tablet 5 mg, 5 mg, Oral, Q8H PRN, Juan Martinez MD, 5 mg at 05/11/19 0140  •  pantoprazole (PROTONIX) 40 mg/100 mL (0.4 mg/mL) in 0.9% NS IVPB, 8 mg/hr, Intravenous, Continuous, Reynaldo Medina MD, Last Rate: 20 mL/hr at 05/11/19 1412, 8 mg/hr at 05/11/19 1412  •  potassium phosphate 45 mmol in sodium chloride 0.9 % 500 mL infusion, 45 mmol, Intravenous, PRN **OR** potassium phosphate 30 mmol in sodium chloride 0.9 % 250 mL infusion, 30 mmol,  Intravenous, PRN **OR** potassium phosphate 15 mmol in sodium chloride 0.9 % 100 mL infusion, 15 mmol, Intravenous, PRN **OR** sodium phosphates 40 mmol in sodium chloride 0.9 % 500 mL IVPB, 40 mmol, Intravenous, PRN **OR** sodium phosphates 20 mmol in sodium chloride 0.9 % 250 mL IVPB, 20 mmol, Intravenous, PRN, Juan Martinez MD  •  pregabalin (LYRICA) capsule 75 mg, 75 mg, Oral, Q12H, Juan Martinez MD, 75 mg at 05/11/19 0833  •  promethazine (PHENERGAN) injection 25 mg, 25 mg, Intravenous, Q6H PRN, Abdirizak Vasquez MD, 25 mg at 05/11/19 1019  •  sodium chloride 0.9 % flush 3 mL, 3 mL, Intravenous, Q12H, Juan Martinez MD, 3 mL at 05/11/19 0926  •  sodium chloride 0.9 % flush 3-10 mL, 3-10 mL, Intravenous, PRN, Juan Martinez MD  Review of Systems:    She has weakness and fatigue all other systems reviewed and negative    Objective     Vital Signs  Temp:  [98.1 °F (36.7 °C)-98.8 °F (37.1 °C)] 98.2 °F (36.8 °C)  Heart Rate:  [] 103  BP: (107-168)/(56-95) 114/61  Body mass index is 31.28 kg/m².    Intake/Output Summary (Last 24 hours) at 5/11/2019 1529  Last data filed at 5/11/2019 1235  Gross per 24 hour   Intake 1907 ml   Output 400 ml   Net 1507 ml     I/O this shift:  In: 360 [P.O.:360]  Out: -      Physical Exam:   General: patient awake, alert and cooperative   Eyes: Normal lids and lashes, no scleral icterus   Neck: supple, normal ROM   Skin: warm and dry, not jaundiced   Cardiovascular: regular rhythm and rate, no murmurs auscultated   Pulm: clear to auscultation bilaterally, regular and unlabored   Abdomen: soft, nontender, nondistended; normal bowel sounds   Rectal: deferred   Extremities: no rash or edema   Psychiatric: Normal mood and behavior; memory intact     Results Review:     I reviewed the patient's new clinical results.    Results from last 7 days   Lab Units 05/11/19  1023 05/10/19  1636 05/10/19  0846 05/10/19  0359 05/09/19  2043   WBC 10*3/mm3 10.76  --   --  8.95 13.99*    HEMOGLOBIN g/dL 8.1* 8.7* 8.7* 7.6* 7.9*   HEMATOCRIT % 24.9* 27.9* 26.0* 21.9* 23.0*   PLATELETS 10*3/mm3 135*  --   --  111* 167     Results from last 7 days   Lab Units 05/10/19  0359 05/09/19  2043 05/06/19  1106   SODIUM mmol/L 133* 125* 140   POTASSIUM mmol/L 4.3 6.1* 4.1   CHLORIDE mmol/L 100 90* 101   TOTAL CO2 mmol/L  --   --  26.7   CO2 mmol/L 17.1* 14.6*  --    BUN mg/dL 55* 60* 17   CREATININE mg/dL 0.94 1.10* 0.96   CALCIUM mg/dL 7.8* 8.3* 9.8   BILIRUBIN mg/dL  --  1.6*  --    ALK PHOS U/L  --  123*  --    ALT (SGPT) U/L  --  47*  --    AST (SGOT) U/L  --  32  --    GLUCOSE mg/dL 500* 675*  --      Results from last 7 days   Lab Units 05/09/19  2133   INR  1.36*     Lab Results   Lab Value Date/Time    LIPASE 8 (L) 05/09/2019 2043    LIPASE 9 (L) 01/17/2019 1259    LIPASE 8 (L) 12/13/2018 1900    LIPASE 12 (L) 10/02/2018 2332    LIPASE 9 (L) 05/18/2018 0425    LIPASE 22 12/16/2017 1650    LIPASE 10 (L) 10/30/2017 1732    LIPASE 9 (L) 02/21/2017 1202    LIPASE 16 11/10/2014 0724       Radiology:  No orders to display       Assessment/Plan     Patient Active Problem List   Diagnosis   • Cirrhosis of liver (CMS/HCC)   • Rheumatoid arthritis (CMS/HCC)   • Anxiety and depression   • Type 2 diabetes mellitus, uncontrolled, with neuropathy (CMS/HCC)   • Fibrositis   • Change in blood platelet count   • Pancytopenia (CMS/HCC)   • Hypersplenism   • Nausea & vomiting   • DUKES (nonalcoholic steatohepatitis)   • Hyperlipidemia   • Abdominal pain   • Hematemesis   • Ascites   • Portal hypertension (CMS/HCC)   • Systemic lupus (CMS/HCC)   • Secondary esophageal varices without bleeding (CMS/HCC)   • Gastroparesis   • Cirrhosis of liver with ascites (CMS/HCC)   • Diabetic ketoacidosis without coma associated with type 2 diabetes mellitus (CMS/HCC)   • Diabetic peripheral neuropathy (CMS/HCC)   • History of seizure disorder   • Hepatic encephalopathy (CMS/HCC)   • Abnormal finding of blood chemistry    •  Thrombocytopenia (CMS/HCC)   • Fever   • Acute ITP (CMS/HCC)   • Degeneration of lumbosacral intervertebral disc   • Seizure (CMS/HCC)   • Spondylosis without myelopathy   • Intractable vomiting with nausea   • UGI bleed   • Migraine without aura   • Urinary retention   • Type 2 diabetes mellitus with hyperglycemia, with long-term current use of insulin (CMS/HCC)   • BRICE (obstructive sleep apnea)   • Gastroparesis   • Hyperammonemia (CMS/HCC)   • Hyponatremia   • Anemia   • Vitamin D insufficiency   • Hyperglycemia   • Dehydration   • Iron deficiency anemia   • Adverse effect of iron or its compound, subsequent encounter   • Hemorrhage of anus and rectum   • Vulvar lesion   • Acute upper GI bleed     Problem list    GI bleed  Esophageal variceal bleed  [place]ong  Anemia    Assessment/Plan    Octreotide drip for a total of 3 days  Protonix drip can be discontinued   Transfer out of ICU  Consider adding antibiotics in a patient with cirrhosis and a recent acute GI bleed  Follow hemoglobin  Repeat banding in 2 to 4 weeks      I discussed the patients findings and my recommendations with patient and nursing staff.    Los Knutson MD

## 2019-05-11 NOTE — PROGRESS NOTES
58 y.o.   LOS: 2 days   Patient Care Team:  Kelly Flores APRN as PCP - General (Family Medicine)  Blanca Pitts MD as PCP - Claims Attributed  Sukhdeep Mcdowell MD as Consulting Physician (Hematology and Oncology)  Blanca Pitts MD as Referring Physician (Internal Medicine)  Rich Coleman MD as Consulting Physician (Gastroenterology)  Merary Burnett MD as Consulting Physician (Endocrinology)  Kervin Rajput Jr., MD as Consulting Physician (Hematology and Oncology)    Chief Complaint: Elevated blood sugars.    Chief Complaint   Patient presents with   • Nausea   • Vomiting   • Abdominal Pain   • Hyperglycemia       Subjective   Patient is not eating much due to the nausea.  Status post EGD yesterday with banding for esophageal varices.    Interval History:    Review of Systems:   Review of Systems   Constitutional: Positive for appetite change and fatigue. Negative for fever.   Eyes: Negative for visual disturbance.   Respiratory: Negative for shortness of breath.    Cardiovascular: Negative for palpitations and leg swelling.   Gastrointestinal: Positive for nausea. Negative for abdominal pain and vomiting.   Endocrine: Negative for polydipsia and polyuria.   Musculoskeletal: Positive for arthralgias. Negative for joint swelling and neck pain.   Skin: Negative for rash.   Neurological: Positive for weakness. Negative for numbness.   Psychiatric/Behavioral: Negative for behavioral problems.     Objective     Vital Signs   Temp:  [98.1 °F (36.7 °C)-98.8 °F (37.1 °C)] 98.2 °F (36.8 °C)  Heart Rate:  [] 103  BP: (107-168)/(56-95) 114/61    Physical Exam:  Physical Exam   Constitutional: She is oriented to person, place, and time. She appears well-nourished.   Obese     HENT:   Head: Normocephalic and atraumatic.   Wide neck   Eyes: Conjunctivae and EOM are normal. No scleral icterus.   Neck: Normal range of motion. Neck supple. No thyromegaly present.   Acanthosis nigricans   Cardiovascular:  Normal rate and normal heart sounds.   Pulmonary/Chest: Effort normal and breath sounds normal. No stridor. She has no wheezes.   Abdominal: Soft. Bowel sounds are normal. She exhibits no distension. There is no tenderness.   Central obesity   Musculoskeletal: She exhibits no edema or tenderness.   Neurological: She is alert and oriented to person, place, and time.   Skin: Skin is warm and dry. She is not diaphoretic.   Psychiatric: She has a normal mood and affect.   Vitals reviewed.  Results Review:     I reviewed the patient's new clinical results and summarized them in subjective and in plan.      Glucose   Date/Time Value Ref Range Status   05/10/2019 0359 500 (C) 65 - 99 mg/dL Final   05/09/2019 2043 675 (C) 65 - 99 mg/dL Final     Lab Results (last 24 hours)     Procedure Component Value Units Date/Time    CBC & Differential [[id number]] Collected:  05/11/19 1023    Specimen:  Blood Updated:  05/11/19 1135    Narrative:       The following orders were created for panel order CBC & Differential.  Procedure                               Abnormality         Status                     ---------                               -----------         ------                     CBC Auto Differential[[id number]]        Abnormal            Final result                 Please view results for these tests on the individual orders.    CBC Auto Differential [[id number]]  (Abnormal) Collected:  05/11/19 1023    Specimen:  Blood Updated:  05/11/19 1135     WBC 10.76 10*3/mm3      RBC 2.72 10*6/mm3      Hemoglobin 8.1 g/dL      Hematocrit 24.9 %      MCV 91.5 fL      MCH 29.8 pg      MCHC 32.5 g/dL      RDW 18.4 %      RDW-SD 60.6 fl      MPV 10.0 fL      Platelets 135 10*3/mm3     Manual Differential [[id number]]  (Abnormal) Collected:  05/11/19 1023    Specimen:  Blood Updated:  05/11/19 1135     Neutrophil % 82.0 %      Lymphocyte % 6.0 %      Monocyte % 2.0 %      Eosinophil % 7.0 %      Basophil % 1.0 %      Myelocyte % 2.0 %       Neutrophils Absolute 8.82 10*3/mm3      Lymphocytes Absolute 0.65 10*3/mm3      Monocytes Absolute 0.22 10*3/mm3      Eosinophils Absolute 0.75 10*3/mm3      Basophils Absolute 0.11 10*3/mm3      Anisocytosis Mod/2+     Ovalocytes Slight/1+     Polychromasia Slight/1+     Spherocytes Slight/1+     WBC Morphology Normal     Platelet Morphology Normal    POC Glucose Once [[id number]]  (Abnormal) Collected:  05/11/19 1115    Specimen:  Blood Updated:  05/11/19 1116     Glucose 244 mg/dL     POC Glucose Once [[id number]]  (Abnormal) Collected:  05/11/19 0751    Specimen:  Blood Updated:  05/11/19 0753     Glucose 221 mg/dL     Blood Culture - Blood, Arm, Left [[id number]] Collected:  05/10/19 0555    Specimen:  Blood from Arm, Left Updated:  05/11/19 0645     Blood Culture No growth at 24 hours    Blood Culture - Blood, Arm, Right [[id number]] Collected:  05/10/19 0600    Specimen:  Blood from Arm, Right Updated:  05/11/19 0645     Blood Culture No growth at 24 hours    TSH [[id number]]  (Normal) Collected:  05/11/19 0412    Specimen:  Blood Updated:  05/11/19 0550     TSH 1.570 mIU/mL     T4, Free [[id number]]  (Abnormal) Collected:  05/11/19 0412    Specimen:  Blood Updated:  05/11/19 0550     Free T4 0.78 ng/dL     Lipid Panel [[id number]]  (Abnormal) Collected:  05/11/19 0412    Specimen:  Blood Updated:  05/11/19 0536     Total Cholesterol 133 mg/dL      Triglycerides 159 mg/dL      HDL Cholesterol 34 mg/dL      LDL Cholesterol  67 mg/dL      VLDL Cholesterol 31.8 mg/dL      LDL/HDL Ratio 1.98    Narrative:       Cholesterol Reference Ranges  (U.S. Department of Health and Human Services ATP III Classifications)    Desirable          <200 mg/dL  Borderline High    200-239 mg/dL  High Risk          >240 mg/dL      Triglyceride Reference Ranges  (U.S. Department of Health and Human Services ATP III Classifications)    Normal           <150 mg/dL  Borderline High  150-199 mg/dL  High             200-499 mg/dL  Very High         >500 mg/dL    HDL Reference Ranges  (U.S. Department of Health and Human Services ATP III Classifcations)    Low     <40 mg/dl (major risk factor for CHD)  High    >60 mg/dl ('negative' risk factor for CHD)        LDL Reference Ranges  (U.S. Department of Health and Human Services ATP III Classifcations)    Optimal          <100 mg/dL  Near Optimal     100-129 mg/dL  Borderline High  130-159 mg/dL  High             160-189 mg/dL  Very High        >189 mg/dL    POC Glucose Once [[id number]]  (Abnormal) Collected:  05/11/19 0347    Specimen:  Blood Updated:  05/11/19 0348     Glucose 297 mg/dL     POC Glucose Once [[id number]]  (Abnormal) Collected:  05/10/19 2345    Specimen:  Blood Updated:  05/10/19 2346     Glucose 283 mg/dL     POC Glucose Once [[id number]]  (Abnormal) Collected:  05/10/19 2008    Specimen:  Blood Updated:  05/10/19 2009     Glucose 194 mg/dL     Troponin [[id number]]  (Normal) Collected:  05/10/19 1636    Specimen:  Blood Updated:  05/10/19 1726     Troponin T <0.010 ng/mL      Comment: Specimen hemolyzed.  Results may be affected.       Narrative:       Troponin T Reference Range:  <= 0.03 ng/mL-   Negative for AMI  >0.03 ng/mL-     Abnormal for myocardial necrosis.  Clinicians would have to utilize clinical acumen, EKG, Troponin and serial changes to determine if it is an Acute Myocardial Infarction or myocardial injury due to an underlying chronic condition.     Hemoglobin & Hematocrit, Blood [[id number]]  (Abnormal) Collected:  05/10/19 1636    Specimen:  Blood Updated:  05/10/19 1657     Hemoglobin 8.7 g/dL      Hematocrit 27.9 %     POC Glucose Once [[id number]]  (Normal) Collected:  05/10/19 1554    Specimen:  Blood Updated:  05/10/19 1555     Glucose 120 mg/dL         Imaging Results (last 24 hours)     ** No results found for the last 24 hours. **          Medication Review: DONE      Current Facility-Administered Medications:   •  ALPRAZolam (XANAX) tablet 0.5 mg, 0.5 mg, Oral, BID  PRN, Juan Martinez MD  •  dextrose (D50W) 25 g/ 50mL Intravenous Solution 25-50 mL, 25-50 mL, Intravenous, Q30 Min PRN, Juan Martinez MD  •  insulin glargine (LANTUS) injection 60 Units, 60 Units, Subcutaneous, BID, Merary Burnett MD, 60 Units at 05/11/19 0833  •  insulin lispro (humaLOG) injection 0-10 Units, 0-10 Units, Subcutaneous, 4x Daily With Meals & Nightly, Merary Burnett MD  •  insulin lispro (humaLOG) injection 6 Units, 6 Units, Subcutaneous, TID With Meals, Merary Burnett MD  •  levETIRAcetam (KEPPRA) tablet 500 mg, 500 mg, Oral, BID, Juan Martinez MD, 500 mg at 05/11/19 0833  •  Magnesium Sulfate 2 gram infusion - Mg less than or equal to 1.5 mg/dL, 2 g, Intravenous, PRN **OR** Magnesium Sulfate 1 gram infusion - Mg 1.6-1.9 mg/dL, 1 g, Intravenous, PRN, Juan Martinez MD  •  octreotide (sandoSTATIN) 500 mcg in sodium chloride 0.9 % 100 mL (5 mcg/mL) infusion, 25 mcg/hr, Intravenous, Continuous, Reynaldo Medina MD, Last Rate: 5 mL/hr at 05/10/19 1713, 25 mcg/hr at 05/10/19 1713  •  ondansetron (ZOFRAN) tablet 4 mg, 4 mg, Oral, Q6H PRN **OR** ondansetron (ZOFRAN) injection 4 mg, 4 mg, Intravenous, Q6H PRN, Juan Martinez MD  •  oxyCODONE (ROXICODONE) immediate release tablet 5 mg, 5 mg, Oral, Q8H PRN, Juan Martinez MD, 5 mg at 05/11/19 0140  •  pantoprazole (PROTONIX) 40 mg/100 mL (0.4 mg/mL) in 0.9% NS IVPB, 8 mg/hr, Intravenous, Continuous, Reynaldo Medina MD, Last Rate: 20 mL/hr at 05/11/19 1412, 8 mg/hr at 05/11/19 1412  •  potassium phosphate 45 mmol in sodium chloride 0.9 % 500 mL infusion, 45 mmol, Intravenous, PRN **OR** potassium phosphate 30 mmol in sodium chloride 0.9 % 250 mL infusion, 30 mmol, Intravenous, PRN **OR** potassium phosphate 15 mmol in sodium chloride 0.9 % 100 mL infusion, 15 mmol, Intravenous, PRN **OR** sodium phosphates 40 mmol in sodium chloride 0.9 % 500 mL IVPB, 40 mmol, Intravenous, PRN **OR** sodium phosphates 20 mmol in sodium chloride 0.9 % 250 mL  "IVPB, 20 mmol, Intravenous, PRN, Juan Martinez MD  •  pregabalin (LYRICA) capsule 75 mg, 75 mg, Oral, Q12H, Juan Martinez MD, 75 mg at 05/11/19 0833  •  promethazine (PHENERGAN) injection 25 mg, 25 mg, Intravenous, Q6H PRN, Abdirizak Vasquez MD, 25 mg at 05/11/19 1019  •  sodium chloride 0.9 % flush 3 mL, 3 mL, Intravenous, Q12H, Juan Martinez MD, 3 mL at 05/11/19 0926  •  sodium chloride 0.9 % flush 3-10 mL, 3-10 mL, Intravenous, PRN, Juan Martinez MD    Assessment/Plan     Active Hospital Problems    Diagnosis  POA   • Acute upper GI bleed [K92.2]  Yes      Resolved Hospital Problems   No resolved problems to display.     Type 2 diabetes mellitus-severely uncontrolled  Continue Lantus 60 units twice daily with holding parameters  Start Humalog 6 units with each meal  Continue Humalog sliding scale 3 times daily AC and at bedtime.    Nonthyroidal illness  Noted that free T4 levels are mildly abnormal  Would monitor her thyroid function tests in 4 to 6 weeks from discharge.    LDL at goal.    Discussed plan with Nurse.     Merary Burnett MD.  05/11/19  3:28 PM      EMR Dragon / transcription disclaimer:    \"Dictated utilizing Dragon dictation\".   "

## 2019-05-11 NOTE — PROGRESS NOTES
"  Daily Progress Note.   [facility] INTENSIVE CARE  5/11/2019    Patient:  Name:  Silvia Zabala  MRN:  [id number]  1960  58 y.o.  female         CC: multiple, gib    SUMMARY:  Patient is a 58-year-old female admitted on the 10th secondary to melena acute GI bleed hyperglycemia with a history of nonalcoholic steatohepatitis gastroparesis type 2 diabetes hypersplenism and pancytopenia.  She presented complaining of multiple complaints including fatigue fatigue or leg swelling abdominal distention abdominal pain nauseousness vomiting coffee-ground emesis and dark tarry stools.      Interval History:  She has also been taken for an EGD with banding of her esophageal varices.  Patient reported some chest pain last night however resolved none at present.  Feels that her breathing is better.  No more hematemesis.  Feels some abdominal bloating but feels that this is getting better as well.  Tolerating liquid diet fairly well.    ROS: No fever, no diarrhea, no chest pain at present, resolved after procedure  PMFSSH: no change    Physical Exam:  /57   Pulse 99   Temp 98.2 °F (36.8 °C) (Oral)   Resp 16   Ht 167.6 cm (66\")   Wt 87.9 kg (193 lb 12.6 oz)   SpO2 100%   BMI 31.28 kg/m²   Body mass index is 31.28 kg/m².    Intake/Output Summary (Last 24 hours) at 5/11/2019 0957  Last data filed at 5/11/2019 0541  Gross per 24 hour   Intake 1852.09 ml   Output 600 ml   Net 1252.09 ml     GENERAL:  nontoxic, Alert  EYES: EOMI, nonicteric sclera  NECK: trach midline, no thyromegaly noted no mass  HEAD/THROAT:  NCAT, OP clear no JVD MMM  PULMONARY:    CTAB, no wheeze, no crackles, no rhonchi, symmetric air entry  CARDIAC:  Tachy reg no rub  ABD: distended bs+ non tender, non rigid  EXT: no c/c/e, pulses symmetric 2+ bilaterally  NEURO:  CNs II-XII intact, sensation intact  SKIN: warm and dry  PSYCH: appropriate mood, oriented      Data Review:  Notable Labs:  Results from last 7 days   Lab Units " 05/10/19  1636 05/10/19  0846 05/10/19  0359 05/09/19 2043   WBC 10*3/mm3  --   --  8.95 13.99*   HEMOGLOBIN g/dL 8.7* 8.7* 7.6* 7.9*   PLATELETS 10*3/mm3  --   --  111* 167     Results from last 7 days   Lab Units 05/10/19  0359 05/09/19 2043 05/06/19  1106   SODIUM mmol/L 133* 125* 140   POTASSIUM mmol/L 4.3 6.1* 4.1   CHLORIDE mmol/L 100 90* 101   TOTAL CO2 mmol/L  --   --  26.7   CO2 mmol/L 17.1* 14.6*  --    BUN mg/dL 55* 60* 17   CREATININE mg/dL 0.94 1.10* 0.96   GLUCOSE mg/dL 500* 675*  --    CALCIUM mg/dL 7.8* 8.3* 9.8   MAGNESIUM mg/dL 1.6 1.7  --    PHOSPHORUS mg/dL 2.1* 3.3  --    Estimated Creatinine Clearance: 74.6 mL/min (by C-G formula based on SCr of 0.94 mg/dL).    Imaging:  reviewed    Scheduled meds:      insulin glargine 60 Units Subcutaneous BID   insulin lispro 0-10 Units Subcutaneous Q4H   levETIRAcetam 500 mg Oral BID   pregabalin 75 mg Oral Q12H   sodium chloride 3 mL Intravenous Q12H       ASSESSMENT  /  PLAN:  Acute GI bleed  Esophageal varices s/p banding on 5/10/2019  Nonalcoholic steatohepatitis  Acute blood loss anemia  Hyponatremia  Hyperkalemia  Acute kidney injury  Diabetes mellitus type 2 on insulin  Severe hyperglycemia  Elevated liver enzymes  Acute on chronic anemia       Sugars better controlled, endocrinology managing.  Off insulin drip  Serial cbc, if stable and gi okay will move to floor however given variceal bleed reasonable to monitor in unit today.  Serial labs.          Preet Rodriguez MD  [facility]  05/11/19  9:57 AM

## 2019-05-11 NOTE — PLAN OF CARE
Problem: Fall Risk (Adult)  Goal: Identify Related Risk Factors and Signs and Symptoms  Outcome: Ongoing (interventions implemented as appropriate)   05/11/19 1922   Fall Risk (Adult)   Related Risk Factors (Fall Risk) depression/anxiety;sleep pattern alteration;slippery/uneven surfaces;environment unfamiliar   Signs and Symptoms (Fall Risk) presence of risk factors     Goal: Absence of Fall  Outcome: Ongoing (interventions implemented as appropriate)   05/11/19 1922   Fall Risk (Adult)   Absence of Fall making progress toward outcome       Problem: Patient Care Overview  Goal: Plan of Care Review  Outcome: Ongoing (interventions implemented as appropriate)   05/11/19 1922   Coping/Psychosocial   Plan of Care Reviewed With patient;spouse   Plan of Care Review   Progress improving   OTHER   Outcome Summary Hbg stable throughout the day. Pt c/o nausea in the am...Phenergan administered x1 with relief of symptoms. Pt's tolerating PO full liquid diet. Insulin adjusted per Endocrine. Protonix and Octreotide drips continue. VSS. Will continue to monitor.       Problem: Skin Injury Risk (Adult)  Goal: Identify Related Risk Factors and Signs and Symptoms  Outcome: Ongoing (interventions implemented as appropriate)   05/10/19 0640   Skin Injury Risk (Adult)   Related Risk Factors (Skin Injury Risk) critical care admission;fluid intake inadequate     Goal: Skin Health and Integrity  Outcome: Ongoing (interventions implemented as appropriate)   05/11/19 1922   Skin Injury Risk (Adult)   Skin Health and Integrity making progress toward outcome       Problem: Pain, Acute (Adult)  Goal: Identify Related Risk Factors and Signs and Symptoms  Outcome: Ongoing (interventions implemented as appropriate)   05/10/19 1801   Pain, Acute (Adult)   Related Risk Factors (Acute Pain) persistent pain   Signs and Symptoms (Acute Pain) verbalization of pain descriptors;facial mask of pain/grimace     Goal: Acceptable Pain Control/Comfort  Level  Outcome: Ongoing (interventions implemented as appropriate)   05/11/19 1922   Pain, Acute (Adult)   Acceptable Pain Control/Comfort Level making progress toward outcome       Problem: Sepsis/Septic Shock (Adult)  Goal: Signs and Symptoms of Listed Potential Problems Will be Absent, Minimized or Managed (Sepsis/Septic Shock)  Outcome: Ongoing (interventions implemented as appropriate)   05/10/19 1801   Goal/Outcome Evaluation   Problems Assessed (Sepsis) all   Problems Present (Sepsis) situational response

## 2019-05-12 LAB
ALBUMIN SERPL-MCNC: 3 G/DL (ref 3.5–5.2)
ALBUMIN/GLOB SERPL: 1.4 G/DL
ALP SERPL-CCNC: 108 U/L (ref 39–117)
ALT SERPL W P-5'-P-CCNC: 40 U/L (ref 1–33)
ANION GAP SERPL CALCULATED.3IONS-SCNC: 10.5 MMOL/L
ANISOCYTOSIS BLD QL: ABNORMAL
AST SERPL-CCNC: 33 U/L (ref 1–32)
BASOPHILS # BLD MANUAL: 0.08 10*3/MM3 (ref 0–0.2)
BASOPHILS NFR BLD AUTO: 1 % (ref 0–1.5)
BILIRUB SERPL-MCNC: 0.9 MG/DL (ref 0.2–1.2)
BUN BLD-MCNC: 22 MG/DL (ref 6–20)
BUN/CREAT SERPL: 30.1 (ref 7–25)
CALCIUM SPEC-SCNC: 7.9 MG/DL (ref 8.6–10.5)
CHLORIDE SERPL-SCNC: 107 MMOL/L (ref 98–107)
CO2 SERPL-SCNC: 21.5 MMOL/L (ref 22–29)
CREAT BLD-MCNC: 0.73 MG/DL (ref 0.57–1)
DEPRECATED RDW RBC AUTO: 58.7 FL (ref 37–54)
EOSINOPHIL # BLD MANUAL: 0.24 10*3/MM3 (ref 0–0.4)
EOSINOPHIL NFR BLD MANUAL: 3 % (ref 0.3–6.2)
ERYTHROCYTE [DISTWIDTH] IN BLOOD BY AUTOMATED COUNT: 18.6 % (ref 12.3–15.4)
GFR SERPL CREATININE-BSD FRML MDRD: 82 ML/MIN/1.73
GLOBULIN UR ELPH-MCNC: 2.2 GM/DL
GLUCOSE BLD-MCNC: 168 MG/DL (ref 65–99)
GLUCOSE BLDC GLUCOMTR-MCNC: 171 MG/DL (ref 70–130)
GLUCOSE BLDC GLUCOMTR-MCNC: 181 MG/DL (ref 70–130)
GLUCOSE BLDC GLUCOMTR-MCNC: 205 MG/DL (ref 70–130)
GLUCOSE BLDC GLUCOMTR-MCNC: 242 MG/DL (ref 70–130)
HCT VFR BLD AUTO: 25.2 % (ref 34–46.6)
HGB BLD-MCNC: 8.2 G/DL (ref 12–15.9)
LYMPHOCYTES # BLD MANUAL: 0.98 10*3/MM3 (ref 0.7–3.1)
LYMPHOCYTES NFR BLD MANUAL: 12.1 % (ref 19.6–45.3)
LYMPHOCYTES NFR BLD MANUAL: 4 % (ref 5–12)
MCH RBC QN AUTO: 29.7 PG (ref 26.6–33)
MCHC RBC AUTO-ENTMCNC: 32.5 G/DL (ref 31.5–35.7)
MCV RBC AUTO: 91.3 FL (ref 79–97)
METAMYELOCYTES NFR BLD MANUAL: 1 % (ref 0–0)
MICROCYTES BLD QL: ABNORMAL
MONOCYTES # BLD AUTO: 0.33 10*3/MM3 (ref 0.1–0.9)
NEUTROPHILS # BLD AUTO: 6.41 10*3/MM3 (ref 1.7–7)
NEUTROPHILS NFR BLD MANUAL: 78.8 % (ref 42.7–76)
NRBC SPEC MANUAL: 1 /100 WBC (ref 0–0.2)
OVALOCYTES BLD QL SMEAR: ABNORMAL
PLAT MORPH BLD: NORMAL
PLATELET # BLD AUTO: 109 10*3/MM3 (ref 140–450)
PMV BLD AUTO: 11 FL (ref 6–12)
POLYCHROMASIA BLD QL SMEAR: ABNORMAL
POTASSIUM BLD-SCNC: 3.7 MMOL/L (ref 3.5–5.2)
PROT SERPL-MCNC: 5.2 G/DL (ref 6–8.5)
RBC # BLD AUTO: 2.76 10*6/MM3 (ref 3.77–5.28)
SODIUM BLD-SCNC: 139 MMOL/L (ref 136–145)
SPHEROCYTES BLD QL SMEAR: ABNORMAL
WBC MORPH BLD: NORMAL
WBC NRBC COR # BLD: 8.14 10*3/MM3 (ref 3.4–10.8)

## 2019-05-12 PROCEDURE — 63710000001 INSULIN GLARGINE PER 5 UNITS: Performed by: INTERNAL MEDICINE

## 2019-05-12 PROCEDURE — 99232 SBSQ HOSP IP/OBS MODERATE 35: CPT | Performed by: INTERNAL MEDICINE

## 2019-05-12 PROCEDURE — 25010000002 PROMETHAZINE PER 50 MG: Performed by: INTERNAL MEDICINE

## 2019-05-12 PROCEDURE — 85007 BL SMEAR W/DIFF WBC COUNT: CPT | Performed by: INTERNAL MEDICINE

## 2019-05-12 PROCEDURE — 82962 GLUCOSE BLOOD TEST: CPT

## 2019-05-12 PROCEDURE — 25010000002 OCTREOTIDE PER 25 MCG: Performed by: EMERGENCY MEDICINE

## 2019-05-12 PROCEDURE — 25010000002 CEFTRIAXONE PER 250 MG: Performed by: INTERNAL MEDICINE

## 2019-05-12 PROCEDURE — 25010000002 ONDANSETRON PER 1 MG: Performed by: INTERNAL MEDICINE

## 2019-05-12 PROCEDURE — 80053 COMPREHEN METABOLIC PANEL: CPT | Performed by: INTERNAL MEDICINE

## 2019-05-12 PROCEDURE — 85025 COMPLETE CBC W/AUTO DIFF WBC: CPT | Performed by: INTERNAL MEDICINE

## 2019-05-12 PROCEDURE — 63710000001 INSULIN LISPRO (HUMAN) PER 5 UNITS: Performed by: INTERNAL MEDICINE

## 2019-05-12 RX ORDER — CEFTRIAXONE SODIUM 1 G/50ML
1 INJECTION, SOLUTION INTRAVENOUS EVERY 24 HOURS
Status: DISCONTINUED | OUTPATIENT
Start: 2019-05-12 | End: 2019-05-14 | Stop reason: HOSPADM

## 2019-05-12 RX ORDER — HYDROXYZINE 50 MG/1
50 TABLET, FILM COATED ORAL 3 TIMES DAILY PRN
Status: DISCONTINUED | OUTPATIENT
Start: 2019-05-12 | End: 2019-05-14 | Stop reason: HOSPADM

## 2019-05-12 RX ADMIN — INSULIN LISPRO 2 UNITS: 100 INJECTION, SOLUTION INTRAVENOUS; SUBCUTANEOUS at 08:49

## 2019-05-12 RX ADMIN — ALPRAZOLAM 0.5 MG: 0.5 TABLET ORAL at 02:38

## 2019-05-12 RX ADMIN — SODIUM CHLORIDE 8 MG/HR: 900 INJECTION INTRAVENOUS at 05:11

## 2019-05-12 RX ADMIN — INSULIN LISPRO 4 UNITS: 100 INJECTION, SOLUTION INTRAVENOUS; SUBCUTANEOUS at 11:51

## 2019-05-12 RX ADMIN — LEVETIRACETAM 500 MG: 500 TABLET, FILM COATED ORAL at 08:50

## 2019-05-12 RX ADMIN — OCTREOTIDE ACETATE 25 MCG/HR: 500 INJECTION, SOLUTION INTRAVENOUS; SUBCUTANEOUS at 11:51

## 2019-05-12 RX ADMIN — PREGABALIN 75 MG: 75 CAPSULE ORAL at 22:07

## 2019-05-12 RX ADMIN — HYDROXYZINE HYDROCHLORIDE 25 MG: 25 TABLET, FILM COATED ORAL at 11:51

## 2019-05-12 RX ADMIN — INSULIN GLARGINE 60 UNITS: 100 INJECTION, SOLUTION SUBCUTANEOUS at 08:49

## 2019-05-12 RX ADMIN — ALPRAZOLAM 0.5 MG: 0.5 TABLET ORAL at 13:29

## 2019-05-12 RX ADMIN — INSULIN LISPRO 2 UNITS: 100 INJECTION, SOLUTION INTRAVENOUS; SUBCUTANEOUS at 17:01

## 2019-05-12 RX ADMIN — SODIUM CHLORIDE 8 MG/HR: 900 INJECTION INTRAVENOUS at 03:44

## 2019-05-12 RX ADMIN — PREGABALIN 75 MG: 75 CAPSULE ORAL at 08:55

## 2019-05-12 RX ADMIN — INSULIN LISPRO 6 UNITS: 100 INJECTION, SOLUTION INTRAVENOUS; SUBCUTANEOUS at 11:52

## 2019-05-12 RX ADMIN — OXYCODONE HYDROCHLORIDE 5 MG: 5 TABLET ORAL at 08:55

## 2019-05-12 RX ADMIN — INSULIN LISPRO 6 UNITS: 100 INJECTION, SOLUTION INTRAVENOUS; SUBCUTANEOUS at 08:50

## 2019-05-12 RX ADMIN — SODIUM CHLORIDE 8 MG/HR: 900 INJECTION INTRAVENOUS at 14:57

## 2019-05-12 RX ADMIN — SODIUM CHLORIDE 8 MG/HR: 900 INJECTION INTRAVENOUS at 00:00

## 2019-05-12 RX ADMIN — OXYCODONE HYDROCHLORIDE 5 MG: 5 TABLET ORAL at 22:08

## 2019-05-12 RX ADMIN — HYDROXYZINE HYDROCHLORIDE 25 MG: 25 TABLET, FILM COATED ORAL at 00:13

## 2019-05-12 RX ADMIN — INSULIN GLARGINE 60 UNITS: 100 INJECTION, SOLUTION SUBCUTANEOUS at 22:06

## 2019-05-12 RX ADMIN — CEFTRIAXONE SODIUM 1 G: 1 INJECTION, SOLUTION INTRAVENOUS at 16:50

## 2019-05-12 RX ADMIN — LEVETIRACETAM 500 MG: 500 TABLET, FILM COATED ORAL at 22:07

## 2019-05-12 RX ADMIN — INSULIN LISPRO 4 UNITS: 100 INJECTION, SOLUTION INTRAVENOUS; SUBCUTANEOUS at 22:07

## 2019-05-12 RX ADMIN — SODIUM CHLORIDE 8 MG/HR: 900 INJECTION INTRAVENOUS at 09:59

## 2019-05-12 RX ADMIN — SODIUM CHLORIDE, PRESERVATIVE FREE 3 ML: 5 INJECTION INTRAVENOUS at 08:50

## 2019-05-12 RX ADMIN — ALPRAZOLAM 0.5 MG: 0.5 TABLET ORAL at 22:07

## 2019-05-12 RX ADMIN — SODIUM CHLORIDE, PRESERVATIVE FREE 3 ML: 5 INJECTION INTRAVENOUS at 22:17

## 2019-05-12 RX ADMIN — HYDROXYZINE HYDROCHLORIDE 50 MG: 50 TABLET ORAL at 23:26

## 2019-05-12 RX ADMIN — PROMETHAZINE HYDROCHLORIDE 25 MG: 25 INJECTION, SOLUTION INTRAMUSCULAR; INTRAVENOUS at 08:55

## 2019-05-12 RX ADMIN — ONDANSETRON HYDROCHLORIDE 4 MG: 2 SOLUTION INTRAMUSCULAR; INTRAVENOUS at 22:08

## 2019-05-12 RX ADMIN — INSULIN LISPRO 8 UNITS: 100 INJECTION, SOLUTION INTRAVENOUS; SUBCUTANEOUS at 17:00

## 2019-05-12 NOTE — PROGRESS NOTES
"  Daily Progress Note.   [address]  5/12/2019    Patient:  Name:  Silvia Zabala  MRN:  [id number]  1960  58 y.o.  female         CC: multiple, gib    SUMMARY:  Patient is a 58-year-old female admitted on the 10th secondary to melena acute GI bleed hyperglycemia with a history of nonalcoholic steatohepatitis gastroparesis type 2 diabetes hypersplenism and pancytopenia.  She presented complaining of multiple complaints including fatigue fatigue or leg swelling abdominal distention abdominal pain nauseousness vomiting coffee-ground emesis and dark tarry stools.  She has also been taken for an EGD with banding of her esophageal varices.   Maintained on octreotide and ppi drips.  Proph ceftriaxone    Interval History:  Doing well at present.  Some mild itching.  No chest pain.  Mild nausea no vomiting.  Bleeding from some of her IV sites those have been changed.  No further melena no hematemesis no hematochezia.    ROS: No fever, no diarrhea, no chest pain   PMFSSH: no change    Physical Exam:  /73 (BP Location: Right arm, Patient Position: Lying)   Pulse 109   Temp 98.3 °F (36.8 °C)   Resp 16   Ht 167.6 cm (66\")   Wt 89.8 kg (197 lb 15.6 oz)   SpO2 99%   BMI 31.95 kg/m²   Body mass index is 31.95 kg/m².    Intake/Output Summary (Last 24 hours) at 5/12/2019 1501  Last data filed at 5/12/2019 0200  Gross per 24 hour   Intake 240 ml   Output 1800 ml   Net -1560 ml     GENERAL:  nontoxic, Alert  EYES: EOMI, nonicteric sclera  NECK: trach midline, no thyromegaly noted no mass  HEAD/THROAT:  NCAT, OP clear no JVD MMM  PULMONARY:    CTAB, no wheeze, no crackles, no rhonchi, symmetric air entry  CARDIAC:  Tachy reg no rub  ABD: distended bs+ non tender, non rigid  EXT: no c/c/e, pulses symmetric 2+ bilaterally  NEURO:  CNs II-XII intact, sensation intact  SKIN: warm and dry  PSYCH: appropriate mood, oriented      Data Review:  Notable Labs:  Results from last 7 days   Lab Units " 05/12/19  0711 05/11/19  1023 05/10/19  1636 05/10/19  0846 05/10/19  0359 05/09/19 2043   WBC 10*3/mm3 8.14 10.76  --   --  8.95 13.99*   HEMOGLOBIN g/dL 8.2* 8.1* 8.7* 8.7* 7.6* 7.9*   PLATELETS 10*3/mm3 109* 135*  --   --  111* 167     Results from last 7 days   Lab Units 05/12/19  0711 05/10/19  0359 05/09/19 2043 05/06/19  1106   SODIUM mmol/L 139 133* 125* 140   POTASSIUM mmol/L 3.7 4.3 6.1* 4.1   CHLORIDE mmol/L 107 100 90* 101   TOTAL CO2 mmol/L  --   --   --  26.7   CO2 mmol/L 21.5* 17.1* 14.6*  --    BUN mg/dL 22* 55* 60* 17   CREATININE mg/dL 0.73 0.94 1.10* 0.96   GLUCOSE mg/dL 168* 500* 675*  --    CALCIUM mg/dL 7.9* 7.8* 8.3* 9.8   MAGNESIUM mg/dL  --  1.6 1.7  --    PHOSPHORUS mg/dL  --  2.1* 3.3  --    Estimated Creatinine Clearance: 97.1 mL/min (by C-G formula based on SCr of 0.73 mg/dL).    Imaging:  reviewed    Scheduled meds:      insulin glargine 60 Units Subcutaneous BID   insulin lispro 0-10 Units Subcutaneous 4x Daily With Meals & Nightly   insulin lispro 8 Units Subcutaneous TID With Meals   levETIRAcetam 500 mg Oral BID   pregabalin 75 mg Oral Q12H   sodium chloride 3 mL Intravenous Q12H       ASSESSMENT  /  PLAN:  Acute GI bleed  Esophageal varices s/p banding on 5/10/2019  Nonalcoholic steatohepatitis  Acute blood loss anemia  Hyponatremia  Hyperkalemia improved  Acute kidney injury  Diabetes mellitus type 2 on insulin  Severe hyperglycemia  Elevated liver enzymes  Acute on chronic anemia       Sugars better controlled, endocrinology managing.  Off insulin drip  Serial cbc  Continue octreotide and Protonix  I will adjust her home Atarax to 50 mg 3 times daily as needed  Antibiotics for prophylaxis ceftriaxone 7 days          Preet Rodriguez MD  [facility]  05/12/19  4:02 PM

## 2019-05-12 NOTE — PLAN OF CARE
Problem: Patient Care Overview  Goal: Plan of Care Review  Outcome: Ongoing (interventions implemented as appropriate)   05/12/19 0760   Coping/Psychosocial   Plan of Care Reviewed With patient   Plan of Care Review   Progress no change   OTHER   Outcome Summary Transfer from ICU. Octreotide and Protonix drips infusing. +Nausea, no emesis since arrival to [facility]. Blood sugar due in AM.

## 2019-05-12 NOTE — PROGRESS NOTES
58 y.o.   LOS: 3 days   Patient Care Team:  Kelly Flores APRN as PCP - General (Family Medicine)  Blanca Pitts MD as PCP - Claims Attributed  Sukhdeep Mcdowell MD as Consulting Physician (Hematology and Oncology)  Blanca Pitts MD as Referring Physician (Internal Medicine)  Rich Coleman MD as Consulting Physician (Gastroenterology)  Mearry Burnett MD as Consulting Physician (Endocrinology)  Kervin Rajput Jr., MD as Consulting Physician (Hematology and Oncology)    Chief Complaint: Elevated blood sugars.    Chief Complaint   Patient presents with   • Nausea   • Vomiting   • Abdominal Pain   • Hyperglycemia       Subjective   Patient still has some nausea, on full liquid diet.  No vomiting episodes in the last 24 hours.  Blood sugars are decently controlled.    Interval History:    Review of Systems:   Review of Systems   Constitutional: Positive for appetite change and fatigue. Negative for fever.   Respiratory: Negative for shortness of breath.    Cardiovascular: Negative for chest pain.   Gastrointestinal: Positive for nausea. Negative for diarrhea and vomiting.   Endocrine: Negative for polydipsia and polyuria.   Genitourinary: Negative for flank pain.   Musculoskeletal: Positive for arthralgias. Negative for myalgias.   Skin: Negative for pallor.   Neurological: Positive for weakness. Negative for numbness.   Psychiatric/Behavioral: Negative for agitation.     Objective     Vital Signs   Temp:  [98.3 °F (36.8 °C)-100.4 °F (38 °C)] 98.3 °F (36.8 °C)  Heart Rate:  [103-116] 109  Resp:  [16-20] 16  BP: ()/(61-99) 125/73    Physical Exam:  Physical Exam   Constitutional: She is oriented to person, place, and time. She appears well-nourished.   Obese     HENT:   Head: Normocephalic and atraumatic.   Wide neck   Eyes: Conjunctivae and EOM are normal. No scleral icterus.   Neck: Normal range of motion. Neck supple. No thyromegaly present.   Acanthosis nigricans   Cardiovascular: Normal rate  and normal heart sounds.   Pulmonary/Chest: Effort normal and breath sounds normal. No stridor. She has no wheezes.   Abdominal: Soft. Bowel sounds are normal. She exhibits no distension. There is tenderness.   Central obesity   Musculoskeletal: She exhibits no edema or tenderness.   Neurological: She is alert and oriented to person, place, and time.   Skin: Skin is warm and dry. She is not diaphoretic.   Psychiatric: She has a normal mood and affect.   Vitals reviewed.  Results Review:     I reviewed the patient's new clinical results and summarized them in subjective and in plan.      Glucose   Date/Time Value Ref Range Status   05/12/2019 0711 168 (H) 65 - 99 mg/dL Final   05/10/2019 0359 500 (C) 65 - 99 mg/dL Final   05/09/2019 2043 675 (C) 65 - 99 mg/dL Final     Lab Results (last 24 hours)     Procedure Component Value Units Date/Time    POC Glucose Once [[id number]]  (Abnormal) Collected:  05/12/19 1108    Specimen:  Blood Updated:  05/12/19 1108     Glucose 205 mg/dL     CBC & Differential [[id number]] Collected:  05/12/19 0711    Specimen:  Blood Updated:  05/12/19 0829    Narrative:       The following orders were created for panel order CBC & Differential.  Procedure                               Abnormality         Status                     ---------                               -----------         ------                     CBC Auto Differential[[id number]]        Abnormal            Final result                 Please view results for these tests on the individual orders.    CBC Auto Differential [[id number]]  (Abnormal) Collected:  05/12/19 0711    Specimen:  Blood Updated:  05/12/19 0829     WBC 8.14 10*3/mm3      RBC 2.76 10*6/mm3      Hemoglobin 8.2 g/dL      Hematocrit 25.2 %      MCV 91.3 fL      MCH 29.7 pg      MCHC 32.5 g/dL      RDW 18.6 %      RDW-SD 58.7 fl      MPV 11.0 fL      Platelets 109 10*3/mm3     Manual Differential [[id number]]  (Abnormal) Collected:  05/12/19 0711    Specimen:   Blood Updated:  05/12/19 0829     Neutrophil % 78.8 %      Lymphocyte % 12.1 %      Monocyte % 4.0 %      Eosinophil % 3.0 %      Basophil % 1.0 %      Metamyelocyte % 1.0 %      Neutrophils Absolute 6.41 10*3/mm3      Lymphocytes Absolute 0.98 10*3/mm3      Monocytes Absolute 0.33 10*3/mm3      Eosinophils Absolute 0.24 10*3/mm3      Basophils Absolute 0.08 10*3/mm3      nRBC 1.0 /100 WBC      Anisocytosis Mod/2+     Microcytes Slight/1+     Ovalocytes Slight/1+     Polychromasia Slight/1+     Spherocytes Mod/2+     WBC Morphology Normal     Platelet Morphology Normal    Comprehensive Metabolic Panel [[id number]]  (Abnormal) Collected:  05/12/19 0711    Specimen:  Blood Updated:  05/12/19 0813     Glucose 168 mg/dL      BUN 22 mg/dL      Creatinine 0.73 mg/dL      Sodium 139 mmol/L      Potassium 3.7 mmol/L      Chloride 107 mmol/L      CO2 21.5 mmol/L      Calcium 7.9 mg/dL      Total Protein 5.2 g/dL      Albumin 3.00 g/dL      ALT (SGPT) 40 U/L      AST (SGOT) 33 U/L      Alkaline Phosphatase 108 U/L      Total Bilirubin 0.9 mg/dL      eGFR Non African Amer 82 mL/min/1.73      Globulin 2.2 gm/dL      A/G Ratio 1.4 g/dL      BUN/Creatinine Ratio 30.1     Anion Gap 10.5 mmol/L     Narrative:       GFR Normal >60  Chronic Kidney Disease <60  Kidney Failure <15    Blood Culture - Blood, Arm, Left [[id number]] Collected:  05/10/19 0555    Specimen:  Blood from Arm, Left Updated:  05/12/19 0645     Blood Culture No growth at 2 days    Blood Culture - Blood, Arm, Right [[id number]] Collected:  05/10/19 0600    Specimen:  Blood from Arm, Right Updated:  05/12/19 0645     Blood Culture No growth at 2 days    POC Glucose Once [[id number]]  (Abnormal) Collected:  05/12/19 0624    Specimen:  Blood Updated:  05/12/19 0626     Glucose 181 mg/dL     POC Glucose Once [[id number]]  (Abnormal) Collected:  05/11/19 2337    Specimen:  Blood Updated:  05/11/19 2339     Glucose 227 mg/dL     POC Glucose Once [[id number]]  (Abnormal)  Collected:  05/11/19 2033    Specimen:  Blood Updated:  05/11/19 2034     Glucose 240 mg/dL     POC Glucose Once [[id number]]  (Abnormal) Collected:  05/11/19 1718    Specimen:  Blood Updated:  05/11/19 1722     Glucose 220 mg/dL         Imaging Results (last 24 hours)     ** No results found for the last 24 hours. **          Medication Review: DONE      Current Facility-Administered Medications:   •  ALPRAZolam (XANAX) tablet 0.5 mg, 0.5 mg, Oral, BID PRN, Juan Martinez MD, 0.5 mg at 05/12/19 0238  •  dextrose (D50W) 25 g/ 50mL Intravenous Solution 25-50 mL, 25-50 mL, Intravenous, Q30 Min PRN, Juan Martinez MD  •  hydrOXYzine (ATARAX) tablet 25 mg, 25 mg, Oral, TID PRN, Kyle Gordillo MD, 25 mg at 05/12/19 1151  •  insulin glargine (LANTUS) injection 60 Units, 60 Units, Subcutaneous, BID, Merary Burnett MD, 60 Units at 05/12/19 0849  •  insulin lispro (humaLOG) injection 0-10 Units, 0-10 Units, Subcutaneous, 4x Daily With Meals & Nightly, Merary Burnett MD, 4 Units at 05/12/19 1151  •  insulin lispro (humaLOG) injection 8 Units, 8 Units, Subcutaneous, TID With Meals, Merary Burnett MD  •  levETIRAcetam (KEPPRA) tablet 500 mg, 500 mg, Oral, BID, Juan Martinez MD, 500 mg at 05/12/19 0850  •  Magnesium Sulfate 2 gram infusion - Mg less than or equal to 1.5 mg/dL, 2 g, Intravenous, PRN **OR** Magnesium Sulfate 1 gram infusion - Mg 1.6-1.9 mg/dL, 1 g, Intravenous, PRN, Juan Martinez MD  •  octreotide (sandoSTATIN) 500 mcg in sodium chloride 0.9 % 100 mL (5 mcg/mL) infusion, 25 mcg/hr, Intravenous, Continuous, Reynaldo Medina MD, Last Rate: 5 mL/hr at 05/12/19 1151, 25 mcg/hr at 05/12/19 1151  •  ondansetron (ZOFRAN) tablet 4 mg, 4 mg, Oral, Q6H PRN **OR** ondansetron (ZOFRAN) injection 4 mg, 4 mg, Intravenous, Q6H PRN, Juan Martinez MD  •  oxyCODONE (ROXICODONE) immediate release tablet 5 mg, 5 mg, Oral, Q8H PRN, Juan Martinez MD, 5 mg at 05/12/19 0855  •  pantoprazole (PROTONIX) 40 mg/100 mL  "(0.4 mg/mL) in 0.9% NS IVPB, 8 mg/hr, Intravenous, Continuous, Reynaldo Medina MD, Last Rate: 20 mL/hr at 05/12/19 0959, 8 mg/hr at 05/12/19 0959  •  potassium phosphate 45 mmol in sodium chloride 0.9 % 500 mL infusion, 45 mmol, Intravenous, PRN **OR** potassium phosphate 30 mmol in sodium chloride 0.9 % 250 mL infusion, 30 mmol, Intravenous, PRN **OR** potassium phosphate 15 mmol in sodium chloride 0.9 % 100 mL infusion, 15 mmol, Intravenous, PRN **OR** sodium phosphates 40 mmol in sodium chloride 0.9 % 500 mL IVPB, 40 mmol, Intravenous, PRN **OR** sodium phosphates 20 mmol in sodium chloride 0.9 % 250 mL IVPB, 20 mmol, Intravenous, PRN, Juan Martinez MD  •  pregabalin (LYRICA) capsule 75 mg, 75 mg, Oral, Q12H, Juan Martinez MD, 75 mg at 05/12/19 0855  •  promethazine (PHENERGAN) injection 25 mg, 25 mg, Intravenous, Q6H PRN, Abdirizak Vasquez MD, 25 mg at 05/12/19 0855  •  sodium chloride 0.9 % flush 3 mL, 3 mL, Intravenous, Q12H, Juan Martinez MD, 3 mL at 05/12/19 0850  •  sodium chloride 0.9 % flush 3-10 mL, 3-10 mL, Intravenous, PRN, Juan Martinez MD    Assessment/Plan     Active Hospital Problems    Diagnosis  POA   • Acute upper GI bleed [K92.2]  Yes      Resolved Hospital Problems   No resolved problems to display.     Type 2 diabetes mellitus-severely uncontrolled  Continue Lantus 60 units twice daily with holding parameters  Increase Humalog to 8 units with each meal along with holding parameters.  Continue Humalog sliding scale 3 times daily AC and at bedtime.    Nonthyroidal illness  Noted that free T4 levels are mildly abnormal  Would monitor her thyroid function tests in 4 to 6 weeks from discharge.    LDL at goal.    Discussed plan with Nurse.     Merary Burnett MD.  05/12/19  3:28 PM      EMR Dragon / transcription disclaimer:    \"Dictated utilizing Dragon dictation\".   "

## 2019-05-12 NOTE — PLAN OF CARE
Problem: Patient Care Overview  Goal: Plan of Care Review  Outcome: Ongoing (interventions implemented as appropriate)   05/12/19 5346   Plan of Care Review   Progress improving   OTHER   Outcome Summary pain and nausea meds as needed, tolerating meals, protonix and octreotide infusing, vitals stable, no bleeding , will continue to monitor.

## 2019-05-12 NOTE — PROGRESS NOTES
[facility]  Inpatient Progress Note    Reason for Follow Up: GI bleed    Subjective     Interval History:   No evidence of further bleeding, has been transferred out of the ICU, antibiotics added by primary team per my recommendation In  a cirrhotic with GI bleed    Current Facility-Administered Medications:   •  ALPRAZolam (XANAX) tablet 0.5 mg, 0.5 mg, Oral, BID PRN, Juan Martinez MD, 0.5 mg at 05/12/19 1329  •  cefTRIAXone (ROCEPHIN) IVPB 1 g, 1 g, Intravenous, Q24H, Preet Rodriguez MD  •  dextrose (D50W) 25 g/ 50mL Intravenous Solution 25-50 mL, 25-50 mL, Intravenous, Q30 Min PRN, Juan Martinez MD  •  hydrOXYzine (ATARAX) tablet 25 mg, 25 mg, Oral, TID PRN, Kyle Gordillo MD, 25 mg at 05/12/19 1151  •  insulin glargine (LANTUS) injection 60 Units, 60 Units, Subcutaneous, BID, Merary Burnett MD, 60 Units at 05/12/19 0849  •  insulin lispro (humaLOG) injection 0-10 Units, 0-10 Units, Subcutaneous, 4x Daily With Meals & Nightly, Merary Burnett MD, 4 Units at 05/12/19 1151  •  insulin lispro (humaLOG) injection 8 Units, 8 Units, Subcutaneous, TID With Meals, Merary Burnett MD  •  levETIRAcetam (KEPPRA) tablet 500 mg, 500 mg, Oral, BID, Juan Martinez MD, 500 mg at 05/12/19 0850  •  Magnesium Sulfate 2 gram infusion - Mg less than or equal to 1.5 mg/dL, 2 g, Intravenous, PRN **OR** Magnesium Sulfate 1 gram infusion - Mg 1.6-1.9 mg/dL, 1 g, Intravenous, PRN, Juan Martinez MD  •  octreotide (sandoSTATIN) 500 mcg in sodium chloride 0.9 % 100 mL (5 mcg/mL) infusion, 25 mcg/hr, Intravenous, Continuous, Reynaldo Medina MD, Last Rate: 5 mL/hr at 05/12/19 1151, 25 mcg/hr at 05/12/19 1151  •  ondansetron (ZOFRAN) tablet 4 mg, 4 mg, Oral, Q6H PRN **OR** ondansetron (ZOFRAN) injection 4 mg, 4 mg, Intravenous, Q6H PRN, Juan Martinez MD  •  oxyCODONE (ROXICODONE) immediate release tablet 5 mg, 5 mg, Oral, Q8H PRN, Juan Martinez MD, 5 mg at 05/12/19 0855  •  pantoprazole  (PROTONIX) 40 mg/100 mL (0.4 mg/mL) in 0.9% NS IVPB, 8 mg/hr, Intravenous, Continuous, Reynaldo Medina MD, Last Rate: 20 mL/hr at 05/12/19 1457, 8 mg/hr at 05/12/19 1457  •  potassium phosphate 45 mmol in sodium chloride 0.9 % 500 mL infusion, 45 mmol, Intravenous, PRN **OR** potassium phosphate 30 mmol in sodium chloride 0.9 % 250 mL infusion, 30 mmol, Intravenous, PRN **OR** potassium phosphate 15 mmol in sodium chloride 0.9 % 100 mL infusion, 15 mmol, Intravenous, PRN **OR** sodium phosphates 40 mmol in sodium chloride 0.9 % 500 mL IVPB, 40 mmol, Intravenous, PRN **OR** sodium phosphates 20 mmol in sodium chloride 0.9 % 250 mL IVPB, 20 mmol, Intravenous, PRN, Juan Martinez MD  •  pregabalin (LYRICA) capsule 75 mg, 75 mg, Oral, Q12H, Juan Martinez MD, 75 mg at 05/12/19 0855  •  promethazine (PHENERGAN) injection 25 mg, 25 mg, Intravenous, Q6H PRN, Abdirizak Vasquez MD, 25 mg at 05/12/19 0855  •  sodium chloride 0.9 % flush 3 mL, 3 mL, Intravenous, Q12H, Juan Martinez MD, 3 mL at 05/12/19 0850  •  sodium chloride 0.9 % flush 3-10 mL, 3-10 mL, Intravenous, PRN, Juan Martinez MD  Review of Systems:    She has weakness fatigue all other systems reviewed and negative    Objective     Vital Signs  Temp:  [98.3 °F (36.8 °C)-100.4 °F (38 °C)] 98.3 °F (36.8 °C)  Heart Rate:  [105-116] 109  Resp:  [16-20] 16  BP: ()/(62-99) 125/73  Body mass index is 31.95 kg/m².    Intake/Output Summary (Last 24 hours) at 5/12/2019 1506  Last data filed at 5/12/2019 0200  Gross per 24 hour   Intake 240 ml   Output 1800 ml   Net -1560 ml     No intake/output data recorded.     Physical Exam:   General: patient awake, alert and cooperative   Eyes: Normal lids and lashes, no scleral icterus   Neck: supple, normal ROM   Skin: warm and dry, not jaundiced   Cardiovascular: regular rhythm and rate, no murmurs auscultated   Pulm: clear to auscultation bilaterally, regular and unlabored   Abdomen: soft, nontender, nondistended;  normal bowel sounds   Rectal: deferred   Extremities: no rash or edema   Psychiatric: Normal mood and behavior; memory intact     Results Review:     I reviewed the patient's new clinical results.    Results from last 7 days   Lab Units 05/12/19  0711 05/11/19  1023 05/10/19  1636  05/10/19  0359   WBC 10*3/mm3 8.14 10.76  --   --  8.95   HEMOGLOBIN g/dL 8.2* 8.1* 8.7*   < > 7.6*   HEMATOCRIT % 25.2* 24.9* 27.9*   < > 21.9*   PLATELETS 10*3/mm3 109* 135*  --   --  111*    < > = values in this interval not displayed.     Results from last 7 days   Lab Units 05/12/19  0711 05/10/19  0359 05/09/19  2043   SODIUM mmol/L 139 133* 125*   POTASSIUM mmol/L 3.7 4.3 6.1*   CHLORIDE mmol/L 107 100 90*   CO2 mmol/L 21.5* 17.1* 14.6*   BUN mg/dL 22* 55* 60*   CREATININE mg/dL 0.73 0.94 1.10*   CALCIUM mg/dL 7.9* 7.8* 8.3*   BILIRUBIN mg/dL 0.9  --  1.6*   ALK PHOS U/L 108  --  123*   ALT (SGPT) U/L 40*  --  47*   AST (SGOT) U/L 33*  --  32   GLUCOSE mg/dL 168* 500* 675*     Results from last 7 days   Lab Units 05/09/19  2133   INR  1.36*     Lab Results   Lab Value Date/Time    LIPASE 8 (L) 05/09/2019 2043    LIPASE 9 (L) 01/17/2019 1259    LIPASE 8 (L) 12/13/2018 1900    LIPASE 12 (L) 10/02/2018 2332    LIPASE 9 (L) 05/18/2018 0425    LIPASE 22 12/16/2017 1650    LIPASE 10 (L) 10/30/2017 1732    LIPASE 9 (L) 02/21/2017 1202    LIPASE 16 11/10/2014 0724       Radiology:  No orders to display       Assessment/Plan     Patient Active Problem List   Diagnosis   • Cirrhosis of liver (CMS/HCC)   • Rheumatoid arthritis (CMS/HCC)   • Anxiety and depression   • Type 2 diabetes mellitus, uncontrolled, with neuropathy (CMS/HCC)   • Fibrositis   • Change in blood platelet count   • Pancytopenia (CMS/HCC)   • Hypersplenism   • Nausea & vomiting   • DUKES (nonalcoholic steatohepatitis)   • Hyperlipidemia   • Abdominal pain   • Hematemesis   • Ascites   • Portal hypertension (CMS/HCC)   • Systemic lupus (CMS/HCC)   • Secondary esophageal  varices without bleeding (CMS/HCC)   • Gastroparesis   • Cirrhosis of liver with ascites (CMS/HCC)   • Diabetic ketoacidosis without coma associated with type 2 diabetes mellitus (CMS/HCC)   • Diabetic peripheral neuropathy (CMS/HCC)   • History of seizure disorder   • Hepatic encephalopathy (CMS/HCC)   • Abnormal finding of blood chemistry    • Thrombocytopenia (CMS/HCC)   • Fever   • Acute ITP (CMS/HCC)   • Degeneration of lumbosacral intervertebral disc   • Seizure (CMS/HCC)   • Spondylosis without myelopathy   • Intractable vomiting with nausea   • UGI bleed   • Migraine without aura   • Urinary retention   • Type 2 diabetes mellitus with hyperglycemia, with long-term current use of insulin (CMS/HCC)   • BRICE (obstructive sleep apnea)   • Gastroparesis   • Hyperammonemia (CMS/HCC)   • Hyponatremia   • Anemia   • Vitamin D insufficiency   • Hyperglycemia   • Dehydration   • Iron deficiency anemia   • Adverse effect of iron or its compound, subsequent encounter   • Hemorrhage of anus and rectum   • Vulvar lesion   • Acute upper GI bleed     Problem list     GI bleed  Esophageal variceal bleed  Wong  Anemia     Assessment/Plan     Octreotide drip for a total of 3 days  Protonix drip can be discontinued   Transferred out of ICU  Antibiotics added  Follow hemoglobin  Repeat banding in 2 to 4 weeks per Dr. Coleman's discretion, he returns tomorrow      I discussed the patients findings and my recommendations with patient and nursing staff.    Los Knutson MD

## 2019-05-13 LAB
ABO + RH BLD: NORMAL
ALBUMIN SERPL-MCNC: 2.8 G/DL (ref 3.5–5.2)
ALBUMIN/GLOB SERPL: 1.3 G/DL
ALP SERPL-CCNC: 104 U/L (ref 39–117)
ALT SERPL W P-5'-P-CCNC: 36 U/L (ref 1–33)
ANION GAP SERPL CALCULATED.3IONS-SCNC: 8.1 MMOL/L
ANISOCYTOSIS BLD QL: ABNORMAL
AST SERPL-CCNC: 29 U/L (ref 1–32)
BH BB BLOOD EXPIRATION DATE: NORMAL
BH BB BLOOD TYPE BARCODE: 6200
BH BB DISPENSE STATUS: NORMAL
BH BB PRODUCT CODE: NORMAL
BH BB UNIT NUMBER: NORMAL
BILIRUB SERPL-MCNC: 0.7 MG/DL (ref 0.2–1.2)
BUN BLD-MCNC: 17 MG/DL (ref 6–20)
BUN/CREAT SERPL: 22.1 (ref 7–25)
CALCIUM SPEC-SCNC: 7.7 MG/DL (ref 8.6–10.5)
CHLORIDE SERPL-SCNC: 109 MMOL/L (ref 98–107)
CO2 SERPL-SCNC: 22.9 MMOL/L (ref 22–29)
CREAT BLD-MCNC: 0.77 MG/DL (ref 0.57–1)
DEPRECATED RDW RBC AUTO: 60.8 FL (ref 37–54)
EOSINOPHIL # BLD MANUAL: 0.21 10*3/MM3 (ref 0–0.4)
EOSINOPHIL NFR BLD MANUAL: 4 % (ref 0.3–6.2)
ERYTHROCYTE [DISTWIDTH] IN BLOOD BY AUTOMATED COUNT: 19.3 % (ref 12.3–15.4)
GFR SERPL CREATININE-BSD FRML MDRD: 78 ML/MIN/1.73
GLOBULIN UR ELPH-MCNC: 2.2 GM/DL
GLUCOSE BLD-MCNC: 140 MG/DL (ref 65–99)
GLUCOSE BLDC GLUCOMTR-MCNC: 149 MG/DL (ref 70–130)
GLUCOSE BLDC GLUCOMTR-MCNC: 222 MG/DL (ref 70–130)
GLUCOSE BLDC GLUCOMTR-MCNC: 297 MG/DL (ref 70–130)
GLUCOSE BLDC GLUCOMTR-MCNC: 305 MG/DL (ref 70–130)
HCT VFR BLD AUTO: 23.2 % (ref 34–46.6)
HGB BLD-MCNC: 7.3 G/DL (ref 12–15.9)
HYPOCHROMIA BLD QL: ABNORMAL
LYMPHOCYTES # BLD MANUAL: 1.61 10*3/MM3 (ref 0.7–3.1)
LYMPHOCYTES NFR BLD MANUAL: 31 % (ref 19.6–45.3)
LYMPHOCYTES NFR BLD MANUAL: 4 % (ref 5–12)
MCH RBC QN AUTO: 29.6 PG (ref 26.6–33)
MCHC RBC AUTO-ENTMCNC: 31.5 G/DL (ref 31.5–35.7)
MCV RBC AUTO: 93.9 FL (ref 79–97)
METAMYELOCYTES NFR BLD MANUAL: 1 % (ref 0–0)
MONOCYTES # BLD AUTO: 0.21 10*3/MM3 (ref 0.1–0.9)
NEUTROPHILS # BLD AUTO: 3.06 10*3/MM3 (ref 1.7–7)
NEUTROPHILS NFR BLD MANUAL: 59 % (ref 42.7–76)
NRBC BLD AUTO-RTO: 0.2 /100 WBC (ref 0–0.2)
PLAT MORPH BLD: NORMAL
PLATELET # BLD AUTO: 90 10*3/MM3 (ref 140–450)
PMV BLD AUTO: 10.7 FL (ref 6–12)
POLYCHROMASIA BLD QL SMEAR: ABNORMAL
POTASSIUM BLD-SCNC: 3.7 MMOL/L (ref 3.5–5.2)
PROT SERPL-MCNC: 5 G/DL (ref 6–8.5)
RBC # BLD AUTO: 2.47 10*6/MM3 (ref 3.77–5.28)
SODIUM BLD-SCNC: 140 MMOL/L (ref 136–145)
SPHEROCYTES BLD QL SMEAR: ABNORMAL
UNIT  ABO: NORMAL
UNIT  RH: NORMAL
VARIANT LYMPHS NFR BLD MANUAL: 1 % (ref 0–5)
WBC MORPH BLD: NORMAL
WBC NRBC COR # BLD: 5.19 10*3/MM3 (ref 3.4–10.8)

## 2019-05-13 PROCEDURE — 25010000002 CEFTRIAXONE PER 250 MG: Performed by: INTERNAL MEDICINE

## 2019-05-13 PROCEDURE — 82962 GLUCOSE BLOOD TEST: CPT

## 2019-05-13 PROCEDURE — 99232 SBSQ HOSP IP/OBS MODERATE 35: CPT | Performed by: INTERNAL MEDICINE

## 2019-05-13 PROCEDURE — 85007 BL SMEAR W/DIFF WBC COUNT: CPT | Performed by: INTERNAL MEDICINE

## 2019-05-13 PROCEDURE — 63710000001 INSULIN LISPRO (HUMAN) PER 5 UNITS: Performed by: INTERNAL MEDICINE

## 2019-05-13 PROCEDURE — 85025 COMPLETE CBC W/AUTO DIFF WBC: CPT | Performed by: INTERNAL MEDICINE

## 2019-05-13 PROCEDURE — 25010000002 PROMETHAZINE PER 50 MG: Performed by: INTERNAL MEDICINE

## 2019-05-13 PROCEDURE — 63710000001 INSULIN GLARGINE PER 5 UNITS: Performed by: INTERNAL MEDICINE

## 2019-05-13 PROCEDURE — 80053 COMPREHEN METABOLIC PANEL: CPT | Performed by: INTERNAL MEDICINE

## 2019-05-13 RX ORDER — LANSOPRAZOLE
30 KIT
Status: DISCONTINUED | OUTPATIENT
Start: 2019-05-13 | End: 2019-05-14 | Stop reason: HOSPADM

## 2019-05-13 RX ORDER — LEVETIRACETAM 500 MG/1
TABLET ORAL
Qty: 24 TABLET | Refills: 0 | OUTPATIENT
Start: 2019-05-13

## 2019-05-13 RX ADMIN — ALPRAZOLAM 0.5 MG: 0.5 TABLET ORAL at 09:13

## 2019-05-13 RX ADMIN — OXYCODONE HYDROCHLORIDE 5 MG: 5 TABLET ORAL at 17:50

## 2019-05-13 RX ADMIN — INSULIN GLARGINE 60 UNITS: 100 INJECTION, SOLUTION SUBCUTANEOUS at 09:13

## 2019-05-13 RX ADMIN — ALPRAZOLAM 0.5 MG: 0.5 TABLET ORAL at 22:18

## 2019-05-13 RX ADMIN — PREGABALIN 75 MG: 75 CAPSULE ORAL at 09:13

## 2019-05-13 RX ADMIN — LEVETIRACETAM 500 MG: 500 TABLET, FILM COATED ORAL at 09:14

## 2019-05-13 RX ADMIN — SODIUM CHLORIDE, PRESERVATIVE FREE 3 ML: 5 INJECTION INTRAVENOUS at 20:46

## 2019-05-13 RX ADMIN — INSULIN LISPRO 8 UNITS: 100 INJECTION, SOLUTION INTRAVENOUS; SUBCUTANEOUS at 13:15

## 2019-05-13 RX ADMIN — CEFTRIAXONE SODIUM 1 G: 1 INJECTION, SOLUTION INTRAVENOUS at 17:50

## 2019-05-13 RX ADMIN — LANSOPRAZOLE 30 MG: KIT at 17:50

## 2019-05-13 RX ADMIN — PROMETHAZINE HYDROCHLORIDE 25 MG: 25 INJECTION, SOLUTION INTRAMUSCULAR; INTRAVENOUS at 17:56

## 2019-05-13 RX ADMIN — HYDROXYZINE HYDROCHLORIDE 50 MG: 50 TABLET ORAL at 09:13

## 2019-05-13 RX ADMIN — INSULIN LISPRO 4 UNITS: 100 INJECTION, SOLUTION INTRAVENOUS; SUBCUTANEOUS at 17:50

## 2019-05-13 RX ADMIN — INSULIN LISPRO 6 UNITS: 100 INJECTION, SOLUTION INTRAVENOUS; SUBCUTANEOUS at 21:16

## 2019-05-13 RX ADMIN — PROMETHAZINE HYDROCHLORIDE 25 MG: 25 INJECTION, SOLUTION INTRAMUSCULAR; INTRAVENOUS at 09:25

## 2019-05-13 RX ADMIN — HYDROXYZINE HYDROCHLORIDE 50 MG: 50 TABLET ORAL at 17:56

## 2019-05-13 RX ADMIN — INSULIN GLARGINE 60 UNITS: 100 INJECTION, SOLUTION SUBCUTANEOUS at 21:16

## 2019-05-13 RX ADMIN — LEVETIRACETAM 500 MG: 500 TABLET, FILM COATED ORAL at 20:46

## 2019-05-13 RX ADMIN — OXYCODONE HYDROCHLORIDE 5 MG: 5 TABLET ORAL at 09:13

## 2019-05-13 RX ADMIN — SODIUM CHLORIDE 8 MG/HR: 900 INJECTION INTRAVENOUS at 03:27

## 2019-05-13 RX ADMIN — INSULIN LISPRO 8 UNITS: 100 INJECTION, SOLUTION INTRAVENOUS; SUBCUTANEOUS at 09:13

## 2019-05-13 RX ADMIN — PREGABALIN 75 MG: 75 CAPSULE ORAL at 20:46

## 2019-05-13 RX ADMIN — SODIUM CHLORIDE, PRESERVATIVE FREE 3 ML: 5 INJECTION INTRAVENOUS at 09:31

## 2019-05-13 RX ADMIN — INSULIN LISPRO 8 UNITS: 100 INJECTION, SOLUTION INTRAVENOUS; SUBCUTANEOUS at 17:50

## 2019-05-13 NOTE — PROGRESS NOTES
"  Daily Progress Note.   [address]  5/13/2019    Patient:  Name:  Silvia Zabala  MRN:  [id number]  1960  58 y.o.  female         CC: multiple, gib    SUMMARY:  Patient is a 58-year-old female admitted on the 10th secondary to melena acute GI bleed hyperglycemia with a history of nonalcoholic steatohepatitis gastroparesis type 2 diabetes hypersplenism and pancytopenia.  She presented complaining of multiple complaints including fatigue fatigue or leg swelling abdominal distention abdominal pain nauseousness vomiting coffee-ground emesis and dark tarry stools.  She has also been taken for an EGD with banding of her esophageal varices.   Maintained on octreotide and ppi drips.  Proph ceftriaxone    Interval History:  Doing well without further hematemsis or melena.   No nausea on soft diet, being adnvanced per gi today  No soa, chest pain or fever      ROS: No fever, no diarrhea, no chest pain   PMFSSH: no change    Physical Exam:  /60 (BP Location: Right arm, Patient Position: Lying)   Pulse 105   Temp 98.1 °F (36.7 °C) (Oral)   Resp 16   Ht 167.6 cm (66\")   Wt 89.8 kg (197 lb 15.6 oz)   SpO2 100%   BMI 31.95 kg/m²   Body mass index is 31.95 kg/m².    Intake/Output Summary (Last 24 hours) at 5/13/2019 1206  Last data filed at 5/13/2019 0825  Gross per 24 hour   Intake 360 ml   Output 2125 ml   Net -1765 ml     GENERAL:  nontoxic, Alert  EYES: EOMI, nonicteric sclera  NECK: trach midline, no thyromegaly noted no mass  HEAD/THROAT:  NCAT, OP clear no JVD MMM  PULMONARY:    CTAB, no wheeze, no crackles, no rhonchi, symmetric air entry  CARDIAC:  Tachy reg no rub  ABD: distended bs+ non tender, non rigid  EXT: no c/c/e, pulses symmetric 2+ bilaterally  NEURO:  CNs II-XII intact, sensation intact  SKIN: warm and dry  PSYCH: appropriate mood, oriented      Data Review:  Notable Labs:  Results from last 7 days   Lab Units 05/13/19  0636 05/12/19  0711 05/11/19  1023 05/10/19  1636 " 05/10/19  0846 05/10/19  0359 05/09/19  2043   WBC 10*3/mm3 5.19 8.14 10.76  --   --  8.95 13.99*   HEMOGLOBIN g/dL 7.3* 8.2* 8.1* 8.7* 8.7* 7.6* 7.9*   PLATELETS 10*3/mm3 90* 109* 135*  --   --  111* 167     Results from last 7 days   Lab Units 05/13/19  0636 05/12/19  0711 05/10/19  0359 05/09/19 2043   SODIUM mmol/L 140 139 133* 125*   POTASSIUM mmol/L 3.7 3.7 4.3 6.1*   CHLORIDE mmol/L 109* 107 100 90*   CO2 mmol/L 22.9 21.5* 17.1* 14.6*   BUN mg/dL 17 22* 55* 60*   CREATININE mg/dL 0.77 0.73 0.94 1.10*   GLUCOSE mg/dL 140* 168* 500* 675*   CALCIUM mg/dL 7.7* 7.9* 7.8* 8.3*   MAGNESIUM mg/dL  --   --  1.6 1.7   PHOSPHORUS mg/dL  --   --  2.1* 3.3   Estimated Creatinine Clearance: 92.1 mL/min (by C-G formula based on SCr of 0.77 mg/dL).    Imaging:  reviewed    Scheduled meds:      ceftriaxone 1 g Intravenous Q24H   insulin glargine 60 Units Subcutaneous BID   insulin lispro 0-10 Units Subcutaneous 4x Daily With Meals & Nightly   insulin lispro 8 Units Subcutaneous TID With Meals   levETIRAcetam 500 mg Oral BID   pregabalin 75 mg Oral Q12H   sodium chloride 3 mL Intravenous Q12H       ASSESSMENT  /  PLAN:  Acute GI bleed  Esophageal varices s/p banding on 5/10/2019  Nonalcoholic steatohepatitis  Acute blood loss anemia  Hyponatremia  Hyperkalemia improved  Acute kidney injury  Diabetes mellitus type 2 on insulin  Severe hyperglycemia  Elevated liver enzymes  Acute on chronic anemia       Sugars better controlled, endocrinology managing.  Off insulin drip  Serial cbc  Dc octreotide and Protonix as it has been 3 days  Patient mentions that Dr Coleman has discussed with her a mediport.  I can not see an indication at present for this, will defer this to him as an outpatient.  Cont Atarax to 50 mg 3 times daily as needed  Antibiotics for prophylaxis ceftriaxone 7 days can transition to po tomorrow  Home tomorrow unless further inpatient needs per GI              Preet Rodriguez MD  [facility]  05/13/19  1528PM

## 2019-05-13 NOTE — PROGRESS NOTES
58 y.o.   LOS: 4 days   Patient Care Team:  Kelly Flores APRN as PCP - General (Family Medicine)  Blanca Pitts MD as PCP - Claims Attributed  Sukhdeep Mcdowell MD as Consulting Physician (Hematology and Oncology)  Blanca Pitts MD as Referring Physician (Internal Medicine)  Rich Coleman MD as Consulting Physician (Gastroenterology)  Merary Burnett MD as Consulting Physician (Endocrinology)  Kervin Rajput Jr., MD as Consulting Physician (Hematology and Oncology)    Chief Complaint: Elevated blood sugars.    Chief Complaint   Patient presents with   • Nausea   • Vomiting   • Abdominal Pain   • Hyperglycemia       Subjective   No further GI bleeding episodes.  Remains on full liquid diet.  Blood sugars are decently controlled.    Interval History:    Review of Systems:   Review of Systems   Constitutional: Positive for appetite change and fatigue. Negative for fever.   Eyes: Negative for visual disturbance.   Respiratory: Negative for shortness of breath.    Cardiovascular: Negative for palpitations and leg swelling.   Gastrointestinal: Negative for abdominal pain and vomiting.   Endocrine: Negative for polydipsia and polyuria.   Musculoskeletal: Negative for joint swelling and neck pain.   Skin: Negative for rash.   Neurological: Positive for weakness. Negative for numbness.   Psychiatric/Behavioral: Negative for behavioral problems.     Objective     Vital Signs   Temp:  [98.1 °F (36.7 °C)-99.8 °F (37.7 °C)] 98.1 °F (36.7 °C)  Heart Rate:  [105-110] 105  Resp:  [16-20] 16  BP: (105-146)/(60-75) 115/60    Physical Exam:  Physical Exam   Constitutional: She is oriented to person, place, and time. She appears well-nourished.   Obese     HENT:   Head: Normocephalic and atraumatic.   Wide neck   Eyes: Conjunctivae and EOM are normal. No scleral icterus.   Neck: Normal range of motion. Neck supple. No thyromegaly present.   Acanthosis nigricans   Cardiovascular: Normal rate and normal heart sounds.    Pulmonary/Chest: Effort normal and breath sounds normal. No stridor. She has no wheezes.   Abdominal: Soft. Bowel sounds are normal. She exhibits no distension. There is no tenderness.   Central obesity   Musculoskeletal: She exhibits no edema or tenderness.   Neurological: She is alert and oriented to person, place, and time.   Skin: Skin is warm and dry. She is not diaphoretic.   Psychiatric: She has a normal mood and affect.   Vitals reviewed.  Results Review:     I reviewed the patient's new clinical results and summarized them in subjective and in plan.      Glucose   Date/Time Value Ref Range Status   05/13/2019 0636 140 (H) 65 - 99 mg/dL Final   05/12/2019 0711 168 (H) 65 - 99 mg/dL Final     Lab Results (last 24 hours)     Procedure Component Value Units Date/Time    CBC & Differential [[id number]] Collected:  05/13/19 0636    Specimen:  Blood Updated:  05/13/19 0843    Narrative:       The following orders were created for panel order CBC & Differential.  Procedure                               Abnormality         Status                     ---------                               -----------         ------                     CBC Auto Differential[[id number]]        Abnormal            Final result                 Please view results for these tests on the individual orders.    CBC Auto Differential [[id number]]  (Abnormal) Collected:  05/13/19 0636    Specimen:  Blood Updated:  05/13/19 0843     WBC 5.19 10*3/mm3      RBC 2.47 10*6/mm3      Hemoglobin 7.3 g/dL      Hematocrit 23.2 %      MCV 93.9 fL      MCH 29.6 pg      MCHC 31.5 g/dL      RDW 19.3 %      RDW-SD 60.8 fl      MPV 10.7 fL      Platelets 90 10*3/mm3      nRBC 0.2 /100 WBC     Manual Differential [[id number]]  (Abnormal) Collected:  05/13/19 0636    Specimen:  Blood Updated:  05/13/19 0843     Neutrophil % 59.0 %      Lymphocyte % 31.0 %      Monocyte % 4.0 %      Eosinophil % 4.0 %      Metamyelocyte % 1.0 %      Atypical Lymphocyte % 1.0 %       Neutrophils Absolute 3.06 10*3/mm3      Lymphocytes Absolute 1.61 10*3/mm3      Monocytes Absolute 0.21 10*3/mm3      Eosinophils Absolute 0.21 10*3/mm3      Anisocytosis Mod/2+     Hypochromia Mod/2+     Polychromasia Slight/1+     Spherocytes Slight/1+     WBC Morphology Normal     Platelet Morphology Normal    Comprehensive Metabolic Panel [[id number]]  (Abnormal) Collected:  05/13/19 0636    Specimen:  Blood Updated:  05/13/19 0726     Glucose 140 mg/dL      BUN 17 mg/dL      Creatinine 0.77 mg/dL      Sodium 140 mmol/L      Potassium 3.7 mmol/L      Chloride 109 mmol/L      CO2 22.9 mmol/L      Calcium 7.7 mg/dL      Total Protein 5.0 g/dL      Albumin 2.80 g/dL      ALT (SGPT) 36 U/L      AST (SGOT) 29 U/L      Alkaline Phosphatase 104 U/L      Total Bilirubin 0.7 mg/dL      eGFR Non African Amer 78 mL/min/1.73      Globulin 2.2 gm/dL      A/G Ratio 1.3 g/dL      BUN/Creatinine Ratio 22.1     Anion Gap 8.1 mmol/L     Narrative:       GFR Normal >60  Chronic Kidney Disease <60  Kidney Failure <15    Blood Culture - Blood, Arm, Left [[id number]] Collected:  05/10/19 0555    Specimen:  Blood from Arm, Left Updated:  05/13/19 0645     Blood Culture No growth at 3 days    Blood Culture - Blood, Arm, Right [[id number]] Collected:  05/10/19 0600    Specimen:  Blood from Arm, Right Updated:  05/13/19 0645     Blood Culture No growth at 3 days    POC Glucose Once [[id number]]  (Abnormal) Collected:  05/13/19 0617    Specimen:  Blood Updated:  05/13/19 0618     Glucose 149 mg/dL     POC Glucose Once [[id number]]  (Abnormal) Collected:  05/12/19 2031    Specimen:  Blood Updated:  05/12/19 2033     Glucose 242 mg/dL     POC Glucose Once [[id number]]  (Abnormal) Collected:  05/12/19 1645    Specimen:  Blood Updated:  05/12/19 1647     Glucose 171 mg/dL     POC Glucose Once [[id number]]  (Abnormal) Collected:  05/12/19 1108    Specimen:  Blood Updated:  05/12/19 1108     Glucose 205 mg/dL         Imaging Results (last 24  hours)     ** No results found for the last 24 hours. **          Medication Review: DONE      Current Facility-Administered Medications:   •  ALPRAZolam (XANAX) tablet 0.5 mg, 0.5 mg, Oral, BID PRN, Juan Martinez MD, 0.5 mg at 05/13/19 0913  •  cefTRIAXone (ROCEPHIN) IVPB 1 g, 1 g, Intravenous, Q24H, Preet Rodriguez MD, Last Rate: 100 mL/hr at 05/12/19 1650, 1 g at 05/12/19 1650  •  dextrose (D50W) 25 g/ 50mL Intravenous Solution 25-50 mL, 25-50 mL, Intravenous, Q30 Min PRN, Juan Martinez MD  •  hydrOXYzine (ATARAX) tablet 50 mg, 50 mg, Oral, TID PRN, Preet Rodriguez MD, 50 mg at 05/13/19 0913  •  insulin glargine (LANTUS) injection 60 Units, 60 Units, Subcutaneous, BID, Merary Burnett MD, 60 Units at 05/13/19 0913  •  insulin lispro (humaLOG) injection 0-10 Units, 0-10 Units, Subcutaneous, 4x Daily With Meals & Nightly, Merary Burnett MD, 4 Units at 05/12/19 2207  •  insulin lispro (humaLOG) injection 8 Units, 8 Units, Subcutaneous, TID With Meals, Merary Burnett MD, 8 Units at 05/13/19 0913  •  levETIRAcetam (KEPPRA) tablet 500 mg, 500 mg, Oral, BID, Juan Martinez MD, 500 mg at 05/13/19 0914  •  Magnesium Sulfate 2 gram infusion - Mg less than or equal to 1.5 mg/dL, 2 g, Intravenous, PRN **OR** Magnesium Sulfate 1 gram infusion - Mg 1.6-1.9 mg/dL, 1 g, Intravenous, PRN, Juan Martinez MD  •  octreotide (sandoSTATIN) 500 mcg in sodium chloride 0.9 % 100 mL (5 mcg/mL) infusion, 25 mcg/hr, Intravenous, Continuous, Reynaldo Medina MD, Last Rate: 5 mL/hr at 05/12/19 1151, 25 mcg/hr at 05/12/19 1151  •  ondansetron (ZOFRAN) tablet 4 mg, 4 mg, Oral, Q6H PRN **OR** ondansetron (ZOFRAN) injection 4 mg, 4 mg, Intravenous, Q6H PRN, Juan Martinez MD, 4 mg at 05/12/19 2208  •  oxyCODONE (ROXICODONE) immediate release tablet 5 mg, 5 mg, Oral, Q8H PRN, Juan Martinez MD, 5 mg at 05/13/19 0913  •  pantoprazole (PROTONIX) 40 mg/100 mL (0.4 mg/mL) in 0.9% NS IVPB, 8 mg/hr, Intravenous, Continuous,  "Reynaldo Medina MD, Last Rate: 20 mL/hr at 05/13/19 0327, 8 mg/hr at 05/13/19 0327  •  potassium phosphate 45 mmol in sodium chloride 0.9 % 500 mL infusion, 45 mmol, Intravenous, PRN **OR** potassium phosphate 30 mmol in sodium chloride 0.9 % 250 mL infusion, 30 mmol, Intravenous, PRN **OR** potassium phosphate 15 mmol in sodium chloride 0.9 % 100 mL infusion, 15 mmol, Intravenous, PRN **OR** sodium phosphates 40 mmol in sodium chloride 0.9 % 500 mL IVPB, 40 mmol, Intravenous, PRN **OR** sodium phosphates 20 mmol in sodium chloride 0.9 % 250 mL IVPB, 20 mmol, Intravenous, PRN, Juan Martinez MD  •  pregabalin (LYRICA) capsule 75 mg, 75 mg, Oral, Q12H, Juan Martinez MD, 75 mg at 05/13/19 0913  •  promethazine (PHENERGAN) injection 25 mg, 25 mg, Intravenous, Q6H PRN, Abdirizak Vasquez MD, 25 mg at 05/13/19 0925  •  sodium chloride 0.9 % flush 3 mL, 3 mL, Intravenous, Q12H, Juan Martinez MD, 3 mL at 05/12/19 2217  •  sodium chloride 0.9 % flush 3-10 mL, 3-10 mL, Intravenous, PRN, Juan Martinez MD    Assessment/Plan     Active Hospital Problems    Diagnosis  POA   • Acute upper GI bleed [K92.2]  Yes      Resolved Hospital Problems   No resolved problems to display.     Type 2 diabetes mellitus-severely uncontrolled  Continue Lantus 60 units twice daily with holding parameters  Continue Humalog 8 units with each meal along with holding parameters  Continue Humalog sliding scale 3 times daily AC and at bedtime.    Nonthyroidal illness  Noted that free T4 levels are mildly abnormal  Would monitor her thyroid function tests in 4 to 6 weeks from discharge.    LDL at goal.    Discussed plan with Nurse.     Merary Burnett MD.  05/13/19  3:28 PM      EMR Dragon / transcription disclaimer:    \"Dictated utilizing Dragon dictation\".   "

## 2019-05-13 NOTE — PLAN OF CARE
Problem: Fall Risk (Adult)  Goal: Absence of Fall  Outcome: Ongoing (interventions implemented as appropriate)      Problem: Patient Care Overview  Goal: Plan of Care Review  Outcome: Ongoing (interventions implemented as appropriate)   05/13/19 0411   Coping/Psychosocial   Plan of Care Reviewed With patient   Plan of Care Review   Progress improving   OTHER   Outcome Summary No evidence GI bleeding. Assisted to reposition as needed. Medicated for pain and nausea with good relief. Protonix& sandostatin infusing as ordered. Rested well. Will contin   05/13/19 0411   Coping/Psychosocial   Plan of Care Reviewed With patient   Plan of Care Review   Progress improving   OTHER   Outcome Summary No evidence GI bleeding. Assisted to reposition as needed. Medicated for pain and nausea with good relief. Protonix& sandostatin infusing as ordered. Rested well. Will continue to monitor.   ue to monitor.       Problem: Skin Injury Risk (Adult)  Goal: Skin Health and Integrity  Outcome: Ongoing (interventions implemented as appropriate)      Problem: Pain, Acute (Adult)  Goal: Acceptable Pain Control/Comfort Level  Outcome: Ongoing (interventions implemented as appropriate)

## 2019-05-13 NOTE — PROGRESS NOTES
[facility]/[place]     Inpatient Follow Up Note    Patient Identification:  Name: Silvia Zabala  Age: 58 y.o.  Sex: female  :  1960  MRN: [id number]    Information from:patient     CC: Variceal bleed.    History:   She is having no further bleeding.  She does feel tired and depressed.  Her metabolic status is being addressed.  She would like to try some solid food.    Review of Systems:  Constitutional:  Negative   Cardiovascular:  Negative   Respiratory:  Negative             Problem List:  Patient Active Problem List    Diagnosis   • Vulvar lesion [N90.89]   • Acute upper GI bleed [K92.2]   • Hemorrhage of anus and rectum [K62.5]   • Iron deficiency anemia [D50.9]   • Adverse effect of iron or its compound, subsequent encounter [T45.4X5D]   • Hyperglycemia [R73.9]   • Dehydration [E86.0]   • Vitamin D insufficiency [E55.9]   • Type 2 diabetes mellitus with hyperglycemia, with long-term current use of insulin (CMS/HCC) [E11.65, Z79.4]   • BRICE (obstructive sleep apnea) [G47.33]   • Gastroparesis [K31.84]   • Hyperammonemia (CMS/HCC) [E72.20]   • Hyponatremia [E87.1]   • Anemia [D64.9]   • Migraine without aura [G43.009]   • Urinary retention [R33.9]   • UGI bleed [K92.2]   • Intractable vomiting with nausea [R11.2]   • Degeneration of lumbosacral intervertebral disc [M51.37]   • Seizure (CMS/HCC) [R56.9]   • Spondylosis without myelopathy [M47.819]   • Acute ITP (CMS/HCC) [D69.3]   • Fever [R50.9]   • Thrombocytopenia (CMS/HCC) [D69.6]   • Hepatic encephalopathy (CMS/HCC) [K72.90]   • Abnormal finding of blood chemistry  [R79.9]   • Diabetic peripheral neuropathy (CMS/HCC) [E11.42]   • History of seizure disorder [Z86.69]   • Diabetic ketoacidosis without coma associated with type 2 diabetes mellitus (CMS/HCC) [E11.10]   • Cirrhosis of liver with ascites (CMS/HCC) [K74.60, R18.8]   • Gastroparesis [K31.84]   • Portal hypertension (CMS/HCC) [K76.6]   • Systemic lupus (CMS/HCC)  "[M32.9]   • Secondary esophageal varices without bleeding (CMS/HCC) [I85.10]   • Ascites [R18.8]   • Hematemesis [K92.0]   • Abdominal pain [R10.9]   • Hyperlipidemia [E78.5]   • Nausea & vomiting [R11.2]   • DUKES (nonalcoholic steatohepatitis) [K75.81]   • Pancytopenia (CMS/HCC) [D61.818]   • Hypersplenism [D73.1]   • Type 2 diabetes mellitus, uncontrolled, with neuropathy (CMS/HCC) [E11.40, E11.65]   • Fibrositis [M79.7]   • Change in blood platelet count [OTD2175]   • Cirrhosis of liver (CMS/HCC) [K74.60]   • Rheumatoid arthritis (CMS/HCC) [M06.9]   • Anxiety and depression [F41.9, F32.9]     Current Meds:  MAR Reviewed  Scheduled Meds:  ceftriaxone 1 g Intravenous Q24H   insulin glargine 60 Units Subcutaneous BID   insulin lispro 0-10 Units Subcutaneous 4x Daily With Meals & Nightly   insulin lispro 8 Units Subcutaneous TID With Meals   lansoprazole 30 mg Oral BID AC   levETIRAcetam 500 mg Oral BID   pregabalin 75 mg Oral Q12H   sodium chloride 3 mL Intravenous Q12H     Continuous Infusions:   PRN Meds:.•  ALPRAZolam  •  dextrose  •  hydrOXYzine  •  magnesium sulfate **OR** magnesium sulfate in D5W 1g/100mL (PREMIX)  •  ondansetron **OR** ondansetron  •  oxyCODONE  •  potassium phosphate infusion greater than 15 mMoles **OR** potassium phosphate infusion greater than 15 mMoles **OR** potassium phosphate **OR** sodium phosphate IVPB **OR** sodium phosphate IVPB  •  promethazine  •  sodium chloride  Allergies:  Allergies   Allergen Reactions   • Albuterol Anaphylaxis   • Tramadol Nausea Only, Other (See Comments) and GI Intolerance     Per pt causes her \"palsy, meaning shaking and tremors\"    • Lactulose Nausea And Vomiting and Other (See Comments)     Severe abdominal pain   • Quinine Derivatives Nausea And Vomiting       Intake/Output:     Intake/Output Summary (Last 24 hours) at 5/13/2019 1315  Last data filed at 5/13/2019 0825  Gross per 24 hour   Intake 360 ml   Output 2125 ml   Net -1765 ml     New " "allergies/reactions:  None    Physical Exam:  Vitals:   Temp (24hrs), Av.9 °F (37.2 °C), Min:98.1 °F (36.7 °C), Max:99.8 °F (37.7 °C)    Temp:  [98.1 °F (36.7 °C)-99.8 °F (37.7 °C)] 98.1 °F (36.7 °C)  Heart Rate:  [105-110] 105  Resp:  [16-20] 16  BP: (105-146)/(60-75) 115/60  /60 (BP Location: Right arm, Patient Position: Lying)   Pulse 105   Temp 98.1 °F (36.7 °C) (Oral)   Resp 16   Ht 167.6 cm (66\")   Wt 89.8 kg (197 lb 15.6 oz)   SpO2 100%   BMI 31.95 kg/m²     Exam:  NAD  PERRLA. Sclerae and conjunctivae normal  HENT: external inspection normal. Hearing intact.  No respiratory distress.  Alert, oriented, normal affect.         DATA:  Radiology and Labs:   Recent Results (from the past 24 hour(s))   POC Glucose Once    Collection Time: 19  4:45 PM   Result Value Ref Range    Glucose 171 (H) 70 - 130 mg/dL   POC Glucose Once    Collection Time: 19  8:31 PM   Result Value Ref Range    Glucose 242 (H) 70 - 130 mg/dL   POC Glucose Once    Collection Time: 19  6:17 AM   Result Value Ref Range    Glucose 149 (H) 70 - 130 mg/dL   Comprehensive Metabolic Panel    Collection Time: 19  6:36 AM   Result Value Ref Range    Glucose 140 (H) 65 - 99 mg/dL    BUN 17 6 - 20 mg/dL    Creatinine 0.77 0.57 - 1.00 mg/dL    Sodium 140 136 - 145 mmol/L    Potassium 3.7 3.5 - 5.2 mmol/L    Chloride 109 (H) 98 - 107 mmol/L    CO2 22.9 22.0 - 29.0 mmol/L    Calcium 7.7 (L) 8.6 - 10.5 mg/dL    Total Protein 5.0 (L) 6.0 - 8.5 g/dL    Albumin 2.80 (L) 3.50 - 5.20 g/dL    ALT (SGPT) 36 (H) 1 - 33 U/L    AST (SGOT) 29 1 - 32 U/L    Alkaline Phosphatase 104 39 - 117 U/L    Total Bilirubin 0.7 0.2 - 1.2 mg/dL    eGFR Non African Amer 78 >60 mL/min/1.73    Globulin 2.2 gm/dL    A/G Ratio 1.3 g/dL    BUN/Creatinine Ratio 22.1 7.0 - 25.0    Anion Gap 8.1 mmol/L   CBC Auto Differential    Collection Time: 19  6:36 AM   Result Value Ref Range    WBC 5.19 3.40 - 10.80 10*3/mm3    RBC 2.47 (L) 3.77 - 5.28 " 10*6/mm3    Hemoglobin 7.3 (L) 12.0 - 15.9 g/dL    Hematocrit 23.2 (L) 34.0 - 46.6 %    MCV 93.9 79.0 - 97.0 fL    MCH 29.6 26.6 - 33.0 pg    MCHC 31.5 31.5 - 35.7 g/dL    RDW 19.3 (H) 12.3 - 15.4 %    RDW-SD 60.8 (H) 37.0 - 54.0 fl    MPV 10.7 6.0 - 12.0 fL    Platelets 90 (L) 140 - 450 10*3/mm3    nRBC 0.2 0.0 - 0.2 /100 WBC   Manual Differential    Collection Time: 05/13/19  6:36 AM   Result Value Ref Range    Neutrophil % 59.0 42.7 - 76.0 %    Lymphocyte % 31.0 19.6 - 45.3 %    Monocyte % 4.0 (L) 5.0 - 12.0 %    Eosinophil % 4.0 0.3 - 6.2 %    Metamyelocyte % 1.0 (H) 0.0 - 0.0 %    Atypical Lymphocyte % 1.0 0.0 - 5.0 %    Neutrophils Absolute 3.06 1.70 - 7.00 10*3/mm3    Lymphocytes Absolute 1.61 0.70 - 3.10 10*3/mm3    Monocytes Absolute 0.21 0.10 - 0.90 10*3/mm3    Eosinophils Absolute 0.21 0.00 - 0.40 10*3/mm3    Anisocytosis Mod/2+ None Seen    Hypochromia Mod/2+ None Seen    Polychromasia Slight/1+ None Seen    Spherocytes Slight/1+ None Seen    WBC Morphology Normal Normal    Platelet Morphology Normal Normal   Prepare RBC, 2 Units    Collection Time: 05/13/19  7:14 AM   Result Value Ref Range    Product Code U7981E92     Unit Number [id number]     UNIT  ABO A     UNIT  RH POS     Dispense Status RE     Blood Type APOS     Blood Expiration Date 201906052359     Blood Type Barcode 6200     Product Code U6023T06     Unit Number [id number]     UNIT  ABO A     UNIT  RH POS     Dispense Status RE     Blood Type APOS     Blood Expiration Date 201906052359     Blood Type Barcode 6200     Product Code T0729I02     Unit Number [id number]     UNIT  ABO A     UNIT  RH POS     Dispense Status PT     Blood Type APOS     Blood Expiration Date 201906052359     Blood Type Barcode 6200     Product Code V3291L57     Unit Number [id number]     UNIT  ABO A     UNIT  RH POS     Dispense Status PT     Blood Type APOS     Blood Expiration Date 201906052359     Blood Type Barcode 6200    POC Glucose Once     Collection Time: 05/13/19 11:29 AM   Result Value Ref Range    Glucose 305 (H) 70 - 130 mg/dL       Assessment:   Problem List:     Acute upper GI bleed    Variceal bleeding, appears to have responded well to esophageal variceal ligation.    Plan:   Agree with discontinuation of octreotide/Protonix drips, but I would keep her on oral lansoprazole because of the post banding of esophageal ulceration that inevitably occurs.  I will plan on repeating her upper endoscopy in 4 weeks.      Rich Coleman MD  [facility]/[place]  5/13/2019

## 2019-05-14 ENCOUNTER — TELEPHONE (OUTPATIENT)
Dept: GASTROENTEROLOGY | Facility: CLINIC | Age: 59
End: 2019-05-14

## 2019-05-14 VITALS
DIASTOLIC BLOOD PRESSURE: 85 MMHG | HEIGHT: 66 IN | HEART RATE: 114 BPM | RESPIRATION RATE: 16 BRPM | OXYGEN SATURATION: 97 % | WEIGHT: 197.97 LBS | TEMPERATURE: 98.8 F | BODY MASS INDEX: 31.82 KG/M2 | SYSTOLIC BLOOD PRESSURE: 143 MMHG

## 2019-05-14 PROBLEM — D62 ACUTE BLOOD LOSS ANEMIA: Status: ACTIVE | Noted: 2019-05-14

## 2019-05-14 PROBLEM — E87.5 ACUTE HYPERKALEMIA: Status: ACTIVE | Noted: 2019-05-14

## 2019-05-14 PROBLEM — E11.9 DM2 (DIABETES MELLITUS, TYPE 2): Status: ACTIVE | Noted: 2018-12-13

## 2019-05-14 PROBLEM — I85.01 ESOPHAGEAL VARICES WITH BLEEDING (HCC): Status: ACTIVE | Noted: 2017-03-07

## 2019-05-14 LAB
ALBUMIN SERPL-MCNC: 2.9 G/DL (ref 3.5–5.2)
ALBUMIN/GLOB SERPL: 1.2 G/DL
ALP SERPL-CCNC: 133 U/L (ref 39–117)
ALT SERPL W P-5'-P-CCNC: 37 U/L (ref 1–33)
ANION GAP SERPL CALCULATED.3IONS-SCNC: 8.9 MMOL/L
ANISOCYTOSIS BLD QL: ABNORMAL
AST SERPL-CCNC: 38 U/L (ref 1–32)
BILIRUB SERPL-MCNC: 0.6 MG/DL (ref 0.2–1.2)
BUN BLD-MCNC: 17 MG/DL (ref 6–20)
BUN/CREAT SERPL: 21.5 (ref 7–25)
CALCIUM SPEC-SCNC: 7.7 MG/DL (ref 8.6–10.5)
CHLORIDE SERPL-SCNC: 102 MMOL/L (ref 98–107)
CO2 SERPL-SCNC: 23.1 MMOL/L (ref 22–29)
CREAT BLD-MCNC: 0.79 MG/DL (ref 0.57–1)
DEPRECATED RDW RBC AUTO: 62.4 FL (ref 37–54)
EOSINOPHIL # BLD MANUAL: 0.06 10*3/MM3 (ref 0–0.4)
EOSINOPHIL NFR BLD MANUAL: 1 % (ref 0.3–6.2)
ERYTHROCYTE [DISTWIDTH] IN BLOOD BY AUTOMATED COUNT: 19.4 % (ref 12.3–15.4)
GFR SERPL CREATININE-BSD FRML MDRD: 75 ML/MIN/1.73
GLOBULIN UR ELPH-MCNC: 2.5 GM/DL
GLUCOSE BLD-MCNC: 227 MG/DL (ref 65–99)
GLUCOSE BLDC GLUCOMTR-MCNC: 218 MG/DL (ref 70–130)
GLUCOSE BLDC GLUCOMTR-MCNC: 298 MG/DL (ref 70–130)
HCT VFR BLD AUTO: 23.5 % (ref 34–46.6)
HGB BLD-MCNC: 7.7 G/DL (ref 12–15.9)
LYMPHOCYTES # BLD MANUAL: 0.93 10*3/MM3 (ref 0.7–3.1)
LYMPHOCYTES NFR BLD MANUAL: 16 % (ref 19.6–45.3)
LYMPHOCYTES NFR BLD MANUAL: 8 % (ref 5–12)
MCH RBC QN AUTO: 30.3 PG (ref 26.6–33)
MCHC RBC AUTO-ENTMCNC: 32.8 G/DL (ref 31.5–35.7)
MCV RBC AUTO: 92.5 FL (ref 79–97)
MONOCYTES # BLD AUTO: 0.47 10*3/MM3 (ref 0.1–0.9)
NEUTROPHILS # BLD AUTO: 4.37 10*3/MM3 (ref 1.7–7)
NEUTROPHILS NFR BLD MANUAL: 75 % (ref 42.7–76)
NRBC BLD AUTO-RTO: 0.2 /100 WBC (ref 0–0.2)
PLAT MORPH BLD: NORMAL
PLATELET # BLD AUTO: 88 10*3/MM3 (ref 140–450)
PMV BLD AUTO: 11.2 FL (ref 6–12)
POTASSIUM BLD-SCNC: 3.9 MMOL/L (ref 3.5–5.2)
PROT SERPL-MCNC: 5.4 G/DL (ref 6–8.5)
RBC # BLD AUTO: 2.54 10*6/MM3 (ref 3.77–5.28)
SODIUM BLD-SCNC: 134 MMOL/L (ref 136–145)
SPHEROCYTES BLD QL SMEAR: ABNORMAL
WBC MORPH BLD: NORMAL
WBC NRBC COR # BLD: 5.83 10*3/MM3 (ref 3.4–10.8)

## 2019-05-14 PROCEDURE — 82962 GLUCOSE BLOOD TEST: CPT

## 2019-05-14 PROCEDURE — 99232 SBSQ HOSP IP/OBS MODERATE 35: CPT | Performed by: INTERNAL MEDICINE

## 2019-05-14 PROCEDURE — 85025 COMPLETE CBC W/AUTO DIFF WBC: CPT | Performed by: INTERNAL MEDICINE

## 2019-05-14 PROCEDURE — 80053 COMPREHEN METABOLIC PANEL: CPT | Performed by: INTERNAL MEDICINE

## 2019-05-14 PROCEDURE — 63710000001 INSULIN GLARGINE PER 5 UNITS: Performed by: INTERNAL MEDICINE

## 2019-05-14 PROCEDURE — 85007 BL SMEAR W/DIFF WBC COUNT: CPT | Performed by: INTERNAL MEDICINE

## 2019-05-14 PROCEDURE — 25010000002 PROMETHAZINE PER 50 MG: Performed by: INTERNAL MEDICINE

## 2019-05-14 PROCEDURE — 63710000001 INSULIN LISPRO (HUMAN) PER 5 UNITS: Performed by: INTERNAL MEDICINE

## 2019-05-14 RX ORDER — LANSOPRAZOLE
30 KIT
Qty: 600 ML | Refills: 2 | Status: SHIPPED | OUTPATIENT
Start: 2019-05-14 | End: 2019-08-12

## 2019-05-14 RX ORDER — CIPROFLOXACIN 500 MG/1
500 TABLET, FILM COATED ORAL 2 TIMES DAILY
Qty: 4 TABLET | Refills: 0 | Status: SHIPPED | OUTPATIENT
Start: 2019-05-14 | End: 2019-05-16

## 2019-05-14 RX ORDER — INSULIN GLARGINE 100 [IU]/ML
70 INJECTION, SOLUTION SUBCUTANEOUS 2 TIMES DAILY
Status: DISCONTINUED | OUTPATIENT
Start: 2019-05-14 | End: 2019-05-14 | Stop reason: HOSPADM

## 2019-05-14 RX ORDER — ONDANSETRON 4 MG/1
4 TABLET, FILM COATED ORAL EVERY 6 HOURS PRN
Qty: 100 TABLET | Refills: 0 | Status: SHIPPED | OUTPATIENT
Start: 2019-05-14 | End: 2019-06-10

## 2019-05-14 RX ADMIN — ALPRAZOLAM 0.5 MG: 0.5 TABLET ORAL at 09:23

## 2019-05-14 RX ADMIN — PROMETHAZINE HYDROCHLORIDE 25 MG: 25 INJECTION, SOLUTION INTRAMUSCULAR; INTRAVENOUS at 09:23

## 2019-05-14 RX ADMIN — INSULIN LISPRO 6 UNITS: 100 INJECTION, SOLUTION INTRAVENOUS; SUBCUTANEOUS at 13:31

## 2019-05-14 RX ADMIN — INSULIN LISPRO 8 UNITS: 100 INJECTION, SOLUTION INTRAVENOUS; SUBCUTANEOUS at 13:32

## 2019-05-14 RX ADMIN — PROMETHAZINE HYDROCHLORIDE 25 MG: 25 INJECTION, SOLUTION INTRAMUSCULAR; INTRAVENOUS at 01:30

## 2019-05-14 RX ADMIN — OXYCODONE HYDROCHLORIDE 5 MG: 5 TABLET ORAL at 13:31

## 2019-05-14 RX ADMIN — LEVETIRACETAM 500 MG: 500 TABLET, FILM COATED ORAL at 10:11

## 2019-05-14 RX ADMIN — INSULIN LISPRO 8 UNITS: 100 INJECTION, SOLUTION INTRAVENOUS; SUBCUTANEOUS at 09:24

## 2019-05-14 RX ADMIN — INSULIN LISPRO 4 UNITS: 100 INJECTION, SOLUTION INTRAVENOUS; SUBCUTANEOUS at 09:04

## 2019-05-14 RX ADMIN — PREGABALIN 75 MG: 75 CAPSULE ORAL at 09:23

## 2019-05-14 RX ADMIN — SODIUM CHLORIDE, PRESERVATIVE FREE 3 ML: 5 INJECTION INTRAVENOUS at 10:12

## 2019-05-14 RX ADMIN — OXYCODONE HYDROCHLORIDE 5 MG: 5 TABLET ORAL at 04:49

## 2019-05-14 RX ADMIN — LANSOPRAZOLE 30 MG: KIT at 07:11

## 2019-05-14 RX ADMIN — INSULIN GLARGINE 60 UNITS: 100 INJECTION, SOLUTION SUBCUTANEOUS at 09:04

## 2019-05-14 NOTE — PLAN OF CARE
Problem: Patient Care Overview  Goal: Plan of Care Review  Outcome: Ongoing (interventions implemented as appropriate)   05/14/19 0302   Coping/Psychosocial   Plan of Care Reviewed With patient   Plan of Care Review   Progress improving   OTHER   Outcome Summary Medicated for Nausea x1. Voiding per swapna. Hgb 7.3 on 5/13, recheck in AM.

## 2019-05-14 NOTE — PROGRESS NOTES
Continued Stay Note  [facility]     Patient Name: Silvia Zabala  MRN: [id number]  Today's Date: 5/14/2019    Admit Date: 5/9/2019    Discharge Plan     Row Name 05/14/19 1208       Plan    Plan  Home with spouse     Plan Comments  Spoke with pt at bedside. Pt plans to return home at dc and denies any discharge planning needs. IM notice given. Possible dc today. JChasteenRN/CCP         Discharge Codes    No documentation.             Sujata Velasquez RN

## 2019-05-14 NOTE — PROGRESS NOTES
58 y.o.   LOS: 5 days   Patient Care Team:  Kelly Flores APRN as PCP - General (Family Medicine)  Blanca Pitts MD as PCP - Claims Attributed  Sukhdeep Mcdowell MD as Consulting Physician (Hematology and Oncology)  Blanca Pitts MD as Referring Physician (Internal Medicine)  Rich Coleman MD as Consulting Physician (Gastroenterology)  Merary Burnett MD as Consulting Physician (Endocrinology)  Kervin Rajput Jr., MD as Consulting Physician (Hematology and Oncology)    Chief Complaint: Elevated blood sugars.    Chief Complaint   Patient presents with   • Nausea   • Vomiting   • Abdominal Pain   • Hyperglycemia       Subjective   Patient has been slowly improving.  Her diet has been advanced.  Blood sugars are running in 200s-250s.    Interval History:    Review of Systems:   Review of Systems   Constitutional: Positive for appetite change and fatigue. Negative for fever.   Respiratory: Negative for shortness of breath.    Cardiovascular: Negative for chest pain.   Gastrointestinal: Negative for diarrhea and vomiting.   Endocrine: Negative for polydipsia and polyuria.   Genitourinary: Negative for flank pain.   Musculoskeletal: Negative for arthralgias and myalgias.   Skin: Negative for pallor.   Neurological: Positive for weakness. Negative for numbness.   Psychiatric/Behavioral: Negative for agitation.     Objective     Vital Signs   Temp:  [98.1 °F (36.7 °C)-99.4 °F (37.4 °C)] 98.6 °F (37 °C)  Heart Rate:  [107-114] 114  Resp:  [16-18] 16  BP: (111-131)/(66-72) 128/66    Physical Exam:  Physical Exam   Constitutional: She is oriented to person, place, and time. She appears well-nourished.   Obese     HENT:   Head: Normocephalic and atraumatic.   Wide neck   Eyes: Conjunctivae and EOM are normal. No scleral icterus.   Neck: Normal range of motion. Neck supple. No thyromegaly present.   Acanthosis nigricans   Cardiovascular: Normal rate and normal heart sounds.   Pulmonary/Chest: Effort normal and  breath sounds normal. No stridor. She has no wheezes.   Abdominal: Soft. Bowel sounds are normal. She exhibits no distension. There is no tenderness.   Central obesity   Musculoskeletal: She exhibits no edema or tenderness.   Neurological: She is alert and oriented to person, place, and time.   Skin: Skin is warm and dry. She is not diaphoretic.   Psychiatric: She has a normal mood and affect.   Vitals reviewed.  Results Review:     I reviewed the patient's new clinical results and summarized them in subjective and in plan.      Glucose   Date/Time Value Ref Range Status   05/14/2019 0525 227 (H) 65 - 99 mg/dL Final   05/13/2019 0636 140 (H) 65 - 99 mg/dL Final   05/12/2019 0711 168 (H) 65 - 99 mg/dL Final     Lab Results (last 24 hours)     Procedure Component Value Units Date/Time    POC Glucose Once [[id number]]  (Abnormal) Collected:  05/14/19 1137    Specimen:  Blood Updated:  05/14/19 1143     Glucose 298 mg/dL     CBC & Differential [[id number]] Collected:  05/14/19 0525    Specimen:  Blood Updated:  05/14/19 0759    Narrative:       The following orders were created for panel order CBC & Differential.  Procedure                               Abnormality         Status                     ---------                               -----------         ------                     CBC Auto Differential[[id number]]        Abnormal            Final result                 Please view results for these tests on the individual orders.    CBC Auto Differential [[id number]]  (Abnormal) Collected:  05/14/19 0525    Specimen:  Blood Updated:  05/14/19 0759     WBC 5.83 10*3/mm3      RBC 2.54 10*6/mm3      Hemoglobin 7.7 g/dL      Hematocrit 23.5 %      MCV 92.5 fL      MCH 30.3 pg      MCHC 32.8 g/dL      RDW 19.4 %      RDW-SD 62.4 fl      MPV 11.2 fL      Platelets 88 10*3/mm3      nRBC 0.2 /100 WBC     Manual Differential [[id number]]  (Abnormal) Collected:  05/14/19 0525    Specimen:  Blood Updated:  05/14/19 0759      Neutrophil % 75.0 %      Lymphocyte % 16.0 %      Monocyte % 8.0 %      Eosinophil % 1.0 %      Neutrophils Absolute 4.37 10*3/mm3      Lymphocytes Absolute 0.93 10*3/mm3      Monocytes Absolute 0.47 10*3/mm3      Eosinophils Absolute 0.06 10*3/mm3      Anisocytosis Large/3+     Spherocytes Slight/1+     WBC Morphology Normal     Platelet Morphology Normal    Blood Culture - Blood, Arm, Left [[id number]] Collected:  05/10/19 0555    Specimen:  Blood from Arm, Left Updated:  05/14/19 0645     Blood Culture No growth at 4 days    Blood Culture - Blood, Arm, Right [[id number]] Collected:  05/10/19 0600    Specimen:  Blood from Arm, Right Updated:  05/14/19 0645     Blood Culture No growth at 4 days    Comprehensive Metabolic Panel [[id number]]  (Abnormal) Collected:  05/14/19 0525    Specimen:  Blood Updated:  05/14/19 0625     Glucose 227 mg/dL      BUN 17 mg/dL      Creatinine 0.79 mg/dL      Sodium 134 mmol/L      Potassium 3.9 mmol/L      Chloride 102 mmol/L      CO2 23.1 mmol/L      Calcium 7.7 mg/dL      Total Protein 5.4 g/dL      Albumin 2.90 g/dL      ALT (SGPT) 37 U/L      AST (SGOT) 38 U/L      Alkaline Phosphatase 133 U/L      Total Bilirubin 0.6 mg/dL      eGFR Non African Amer 75 mL/min/1.73      Globulin 2.5 gm/dL      A/G Ratio 1.2 g/dL      BUN/Creatinine Ratio 21.5     Anion Gap 8.9 mmol/L     Narrative:       GFR Normal >60  Chronic Kidney Disease <60  Kidney Failure <15    POC Glucose Once [[id number]]  (Abnormal) Collected:  05/14/19 0612    Specimen:  Blood Updated:  05/14/19 0614     Glucose 218 mg/dL     POC Glucose Once [[id number]]  (Abnormal) Collected:  05/13/19 2057    Specimen:  Blood Updated:  05/13/19 2059     Glucose 297 mg/dL     POC Glucose Once [[id number]]  (Abnormal) Collected:  05/13/19 1611    Specimen:  Blood Updated:  05/13/19 1612     Glucose 222 mg/dL         Imaging Results (last 24 hours)     ** No results found for the last 24 hours. **          Medication Review:  DONE      Current Facility-Administered Medications:   •  ALPRAZolam (XANAX) tablet 0.5 mg, 0.5 mg, Oral, BID PRN, Juan Martinez MD, 0.5 mg at 05/14/19 0923  •  cefTRIAXone (ROCEPHIN) IVPB 1 g, 1 g, Intravenous, Q24H, Preet Rodriguez MD, Last Rate: 100 mL/hr at 05/13/19 1750, 1 g at 05/13/19 1750  •  dextrose (D50W) 25 g/ 50mL Intravenous Solution 25-50 mL, 25-50 mL, Intravenous, Q30 Min PRN, Juan Martinez MD  •  hydrOXYzine (ATARAX) tablet 50 mg, 50 mg, Oral, TID PRN, Preet Rodriguez MD, 50 mg at 05/13/19 1756  •  insulin glargine (LANTUS) injection 70 Units, 70 Units, Subcutaneous, BID, Merary Burnett MD  •  insulin lispro (humaLOG) injection 0-10 Units, 0-10 Units, Subcutaneous, 4x Daily With Meals & Nightly, Merary Burnett MD, 6 Units at 05/14/19 1331  •  insulin lispro (humaLOG) injection 12 Units, 12 Units, Subcutaneous, TID With Meals, Merary Burnett MD  •  lansoprazole (FIRST) oral suspension 30 mg, 30 mg, Oral, BID AC, Rich Coleman MD, 30 mg at 05/14/19 0711  •  levETIRAcetam (KEPPRA) tablet 500 mg, 500 mg, Oral, BID, Juan Martinez MD, 500 mg at 05/14/19 1011  •  Magnesium Sulfate 2 gram infusion - Mg less than or equal to 1.5 mg/dL, 2 g, Intravenous, PRN **OR** Magnesium Sulfate 1 gram infusion - Mg 1.6-1.9 mg/dL, 1 g, Intravenous, PRN, Juan Martinez MD  •  ondansetron (ZOFRAN) tablet 4 mg, 4 mg, Oral, Q6H PRN **OR** ondansetron (ZOFRAN) injection 4 mg, 4 mg, Intravenous, Q6H PRN, Juan Martinez MD, 4 mg at 05/12/19 2208  •  oxyCODONE (ROXICODONE) immediate release tablet 5 mg, 5 mg, Oral, Q8H PRN, Juan Martinez MD, 5 mg at 05/14/19 1331  •  potassium phosphate 45 mmol in sodium chloride 0.9 % 500 mL infusion, 45 mmol, Intravenous, PRN **OR** potassium phosphate 30 mmol in sodium chloride 0.9 % 250 mL infusion, 30 mmol, Intravenous, PRN **OR** potassium phosphate 15 mmol in sodium chloride 0.9 % 100 mL infusion, 15 mmol, Intravenous, PRN **OR** sodium phosphates 40  "mmol in sodium chloride 0.9 % 500 mL IVPB, 40 mmol, Intravenous, PRN **OR** sodium phosphates 20 mmol in sodium chloride 0.9 % 250 mL IVPB, 20 mmol, Intravenous, PRN, Juan Martinez MD  •  pregabalin (LYRICA) capsule 75 mg, 75 mg, Oral, Q12H, Juan Martinez MD, 75 mg at 05/14/19 0923  •  promethazine (PHENERGAN) injection 25 mg, 25 mg, Intravenous, Q6H PRN, Abdirizak Vasquez MD, 25 mg at 05/14/19 0923  •  sodium chloride 0.9 % flush 3 mL, 3 mL, Intravenous, Q12H, Juan Martinez MD, 3 mL at 05/14/19 1012  •  sodium chloride 0.9 % flush 3-10 mL, 3-10 mL, Intravenous, PRN, Juan Martinez MD    Assessment/Plan     Active Hospital Problems    Diagnosis  POA   • Acute upper GI bleed [K92.2]  Yes      Resolved Hospital Problems   No resolved problems to display.     Type 2 diabetes mellitus-severely uncontrolled  Increase Lantus to 70 units twice daily along with holding parameters  Increase Humalog to 12 units with each meal   Continue Humalog sliding scale 3 times daily AC and at bedtime.    Nonthyroidal illness  Noted that free T4 levels are mildly abnormal  Would monitor her thyroid function tests in 4 to 6 weeks from discharge.    LDL at goal.    Discussed plan with Nurse.     Merary Burnett MD.  05/14/19  3:28 PM      EMR Dragon / transcription disclaimer:    \"Dictated utilizing Dragon dictation\".   "

## 2019-05-14 NOTE — TELEPHONE ENCOUNTER
Nina from [facility] [phone] called. Dr Vidal would like to know if it is ok to discharge patient home. Spoke to Dr. Coleman verbal order give to discharge patient home. Called Nina at [phone]. Gave her verbal order to discharge patient home from Dr. Coleman.

## 2019-05-14 NOTE — DISCHARGE SUMMARY
Date of Admission: 5/9/2019  Date of Discharge:  5/14/2019    Discharge Diagnosis:    Acute GI bleed  Esophageal varices s/p banding on 5/10/2019  Nonalcoholic steatohepatitis  Acute blood loss anemia  Hyponatremia  Hyperkalemia improved  Acute kidney injury  Diabetes mellitus type 2 on insulin  Severe hyperglycemia  Elevated liver enzymes  Acute on chronic anemia    Hospital Course    Presenting Problem/History of Present Illness    58-year-old  female who presents for nausea and vomiting.  Symptoms started 48 hours ago.  Still present.  Have been intermittent but gradually worsening in interval and severity over the past few hours.  She came to the emergency room for this reason.  Nothing alleviates her nausea and vomiting other than IV antiemetics in the emergency room.  Associated with abdominal distention, anorexia and abdominal pain.  She says she had some subjective fever.  She has had coffee-ground emesis.  She is also had black tarry stools.  This is been present for the past 24 hours.  Her blood glucose this morning was 227.  It is currently greater than 600.  I discussed the case with Dr. Medina from the emergency room.    Subsequent Course of Management    58-year-old female admitted with nausea and vomiting and then with diarrhea.  Also abdominal distention.  She was noted to have tarry stools coffee-ground emesis.  She was therefore admitted to the intensive care unit.  Evidence of GI bleed and possible esophageal varices.  Dr. Rich Coleman was consulted.  She was started on insulin drip for acute hypoglycemia.  Endocrine was consulted due to severe hypoglycemia.  She was started on subcutaneous insulin by the endocrine team.  EGD report on 5/10/2019 states grade 2 varices in the lower part of the esophagus.  4 bands were successfully placed.  Mild portal hypertensive gastropathy noted.  Recommendations by GI was to continue the octreotide and the pantoprazole drips.  Patient continued to do  "well.  She did have some complaints of nausea and vomiting.  GI saw her and recommended transfer out of the ICU.  Endocrine continue to follow-up with the patient.  She remained on the insulin drip.  Her Atarax was adjusted to 50 mg 3 times a day.  She is now out of the units.  Transition to oral ceftriaxone equivalent for prophylaxis of ascites and esophageal bleed GI bleed due to esophageal varices.  She has been taken off the pantoprazole and octreotide drips.  Oral lansoprazole to be continued.  Confirmed with Dr. Coleman that patient can be discharged today.  She will need a repeat EGD in 4 weeks.  She will need continued follow-up by her endocrine physicians.  Endocrinology wants a repeat thyroid function test in 4 to 6 weeks.  I will switch her ceftriaxone to ciprofloxacin 500 twice daily for completion of 7-day course.  Confirmed with pharmacy.    Examination on Date of Discharge    /85 (BP Location: Right arm, Patient Position: Lying)   Pulse 114   Temp 98.8 °F (37.1 °C) (Oral)   Resp 16   Ht 167.6 cm (66\")   Wt 89.8 kg (197 lb 15.6 oz)   SpO2 97%   BMI 31.95 kg/m²     Physical Exam   HENT:   Head: Normocephalic.   Eyes: Pupils are equal, round, and reactive to light.   Cardiovascular: Normal rate and regular rhythm.   No murmur heard.  Pulmonary/Chest: Effort normal. No respiratory distress. She has no wheezes. She has no rales.   Abdominal: Soft. Bowel sounds are normal. She exhibits distension and fluid wave. She exhibits no mass. There is no tenderness.   Musculoskeletal: She exhibits edema (1+).   Skin: No rash noted.     Test Results         Results from last 7 days   Lab Units 05/09/19  2132   PH, ARTERIAL pH units 7.414   PCO2, ARTERIAL mm Hg 21.5*   PO2 ART mm Hg 96.1   MODALITY  Room Air   O2 SATURATION CALC % 97.8       Results from last 7 days   Lab Units 05/14/19  0525 05/13/19  0636 05/12/19  0711 05/11/19  1023 05/10/19  1636 05/10/19  0846 05/10/19  0359 05/09/19  2043   WBC " 10*3/mm3 5.83 5.19 8.14 10.76  --   --  8.95 13.99*   HEMOGLOBIN g/dL 7.7* 7.3* 8.2* 8.1* 8.7* 8.7* 7.6* 7.9*   HEMATOCRIT % 23.5* 23.2* 25.2* 24.9* 27.9* 26.0* 21.9* 23.0*   PLATELETS 10*3/mm3 88* 90* 109* 135*  --   --  111* 167       Results from last 7 days   Lab Units 05/14/19  0525 05/13/19  0636 05/12/19  0711 05/10/19  0359 05/09/19  2043   SODIUM mmol/L 134* 140 139 133* 125*   POTASSIUM mmol/L 3.9 3.7 3.7 4.3 6.1*   CHLORIDE mmol/L 102 109* 107 100 90*   CO2 mmol/L 23.1 22.9 21.5* 17.1* 14.6*   BUN mg/dL 17 17 22* 55* 60*   CREATININE mg/dL 0.79 0.77 0.73 0.94 1.10*       Results from last 7 days   Lab Units 05/10/19  1636 05/09/19  2043   TROPONIN T ng/mL <0.010 <0.010       Microbiology Results (last 10 days)     Procedure Component Value - Date/Time    Blood Culture - Blood, Arm, Right [[id number]] Collected:  05/10/19 0600    Lab Status:  Preliminary result Specimen:  Blood from Arm, Right Updated:  05/14/19 0645     Blood Culture No growth at 4 days    Blood Culture - Blood, Arm, Left [[id number]] Collected:  05/10/19 0555    Lab Status:  Preliminary result Specimen:  Blood from Arm, Left Updated:  05/14/19 0645     Blood Culture No growth at 4 days          No results found.    Consulting Physician(s)     Provider Relationship Specialty    Juan Martinez MD Consulting Physician Pulmonary Disease    Rich Coleman MD Consulting Physician Gastroenterology    Merary Burnett MD Consulting Physician Endocrinology          Procedure(s):  ESOPHAGOGASTRODUODENOSCOPY AT BEDSIDE WITH VARICEAL LIGATION       Discharge Instructions      Dietary Orders (From admission, onward)    Start     Ordered    05/13/19 1314  Diet Regular; GI Soft / [place]  Diet Effective Now     Question Answer Comment   Diet Texture / Consistency Regular    Common Modifiers GI Soft / [place]        05/13/19 1313                  Your medication list      START taking these medications      Instructions Last Dose Given Next Dose Due    ciprofloxacin 500 MG tablet  Commonly known as:  CIPRO      Take 1 tablet by mouth 2 (Two) Times a Day for 2 days.       lansoprazole 3 MG/ML suspension oral suspension      Take 10 mL by mouth 2 (Two) Times a Day Before Meals for 90 days.       ondansetron 4 MG tablet  Commonly known as:  ZOFRAN      Take 1 tablet by mouth Every 6 (Six) Hours As Needed for Nausea or Vomiting for up to 30 days.          CHANGE how you take these medications      Instructions Last Dose Given Next Dose Due   ALPRAZolam 0.5 MG tablet  Commonly known as:  XANAX  What changed:  when to take this      Take 1 tablet by mouth 2 (Two) Times a Day As Needed for Anxiety.       Insulin Glargine 100 UNIT/ML injection pen  Commonly known as:  LANTUS SOLOSTAR  What changed:  additional instructions      Inject 70 units in the am and 70 units in the pm       insulin lispro 100 UNIT/ML injection  Commonly known as:  humaLOG  What changed:    · additional instructions  · Another medication with the same name was removed. Continue taking this medication, and follow the directions you see here.      12 units TID with meals          CONTINUE taking these medications      Instructions Last Dose Given Next Dose Due   amitriptyline 50 MG tablet  Commonly known as:  ELAVIL      Take 1 tablet by mouth Every Night.       Cholecalciferol 1000 units tablet      Take 1,000 Units by mouth Daily.       cyclobenzaprine 5 MG tablet  Commonly known as:  FLEXERIL      TAKE ONE TABLET BY MOUTH EVERY NIGHT AT BEDTIME       DULoxetine 30 MG capsule  Commonly known as:  CYMBALTA      Take 90 mg by mouth Daily.       estradiol 10 MCG tablet vaginal tablet  Commonly known as:  VAGIFEM      Insert 1 tablet into the vagina 2 (Two) Times a Week.       folic acid 1 MG tablet  Commonly known as:  FOLVITE      Take 1 mg by mouth Daily.       FREESTYLE YESIKA SENSOR SYSTEM      1 Device Every 14 (Fourteen) Days.       furosemide 40 MG tablet  Commonly known as:  LASIX      Take 1  tablet by mouth Daily.       hydrOXYzine 25 MG tablet  Commonly known as:  ATARAX      Take 50 mg by mouth 3 (Three) Times a Day As Needed for Itching.       levETIRAcetam 500 MG tablet  Commonly known as:  KEPPRA      TAKE ONE TABLET BY MOUTH TWICE A DAY       MIRALAX powder  Generic drug:  polyethylene glycol      Take 17 g by mouth Daily.       multivitamin with minerals tablet tablet      Take 1 tablet by mouth Daily.       OLANZapine 5 MG tablet  Commonly known as:  zyPREXA      Take 5 mg by mouth Every Night.       OXAYDO 7.5 MG tablet   Generic drug:  oxyCODONE HCl      Take 7.5 mg by mouth Every 8 (Eight) Hours As Needed.       pantoprazole 40 MG EC tablet  Commonly known as:  PROTONIX      TAKE ONE TABLET BY MOUTH DAILY       pregabalin 75 MG capsule  Commonly known as:  LYRICA      Take 75 mg by mouth 2 (Two) Times a Day.       promethazine 25 MG tablet  Commonly known as:  PHENERGAN      Take 1 tablet by mouth Every 6 (Six) Hours As Needed for Nausea or Vomiting.       spironolactone 100 MG tablet  Commonly known as:  ALDACTONE      TAKE ONE TABLET BY MOUTH DAILY       traZODone 100 MG tablet  Commonly known as:  DESYREL      Take 1/2 to 1 tablet at bedtime as needed for insomnia       vitamin B-12 1000 MCG tablet  Commonly known as:  CYANOCOBALAMIN      Take 1,000 mcg by mouth daily.       XIFAXAN 550 MG tablet  Generic drug:  rifaximin      TAKE ONE TABLET BY MOUTH TWICE A DAY       ZINC GLUCONATE PO      Take 1 tablet by mouth Daily.             Where to Get Your Medications      These medications were sent to [facility] [address] [place] [address] - [address] AT [facility] - [phone]  - [phone]   [address] , [facility] [address]    Phone:  [phone]   · ciprofloxacin 500 MG tablet  · Insulin Glargine 100 UNIT/ML injection pen  · lansoprazole 3 MG/ML suspension oral suspension  · ondansetron 4 MG tablet     Information about where to get these  medications is not yet available    Ask your nurse or doctor about these medications  · insulin lispro 100 UNIT/ML injection       Follow-up Information     Rich Coleman MD. Schedule an appointment as soon as possible for a visit in 1 month(s).    Specialty:  Gastroenterology  Why:  for EGD  Contact information:  [address] [facility] [address]  [facility] [address]  [phone]             Kelly Flores APRN Follow up in 1 week(s).    Specialty:  Family Medicine  Contact information:  [address] [facility] [address]  [facility] [id number] [phone]             Blanca Pitts MD Follow up.    Specialty:  Internal Medicine  Contact information:  [address] [facility] [address]  [facility] [id number] [phone]             Merary Burnett MD. Schedule an appointment as soon as possible for a visit in 4 week(s).    Specialty:  Endocrinology  Contact information:  [address] [facility] [address]  [facility] [id number] [phone]                     Condition on Discharge:  Stable     Andrea Hall MD  05/14/19  3:40 PM    Time: I spent over 30mins in the discharge planning of this patient.    Some of this encounter note is an electronic transcription/translation of spoken language to printed text.

## 2019-05-15 ENCOUNTER — READMISSION MANAGEMENT (OUTPATIENT)
Dept: CALL CENTER | Facility: HOSPITAL | Age: 59
End: 2019-05-15

## 2019-05-15 ENCOUNTER — RESULTS ENCOUNTER (OUTPATIENT)
Dept: ENDOCRINOLOGY | Age: 59
End: 2019-05-15

## 2019-05-15 DIAGNOSIS — E11.42 DIABETIC PERIPHERAL NEUROPATHY (HCC): ICD-10-CM

## 2019-05-15 DIAGNOSIS — K31.84 GASTROPARESIS: ICD-10-CM

## 2019-05-15 DIAGNOSIS — E78.5 HYPERLIPIDEMIA, UNSPECIFIED HYPERLIPIDEMIA TYPE: ICD-10-CM

## 2019-05-15 DIAGNOSIS — K75.81 NASH (NONALCOHOLIC STEATOHEPATITIS): ICD-10-CM

## 2019-05-15 DIAGNOSIS — IMO0002 TYPE 2 DIABETES MELLITUS, UNCONTROLLED, WITH NEUROPATHY: ICD-10-CM

## 2019-05-15 LAB
BACTERIA SPEC AEROBE CULT: NORMAL
BACTERIA SPEC AEROBE CULT: NORMAL

## 2019-05-15 NOTE — OUTREACH NOTE
Prep Survey      Responses   Facility patient discharged from?  [facility]   Is patient eligible?  Yes   Discharge diagnosis  Acute GI bleed, esophageal varices s/p banding 05/10/19, nonalcoholic steatohepatitis, Acute blood loss anemia, Hyponatremia, Hyperkalemia, APRIL, IDDM II, severe hyperglycemia, elevated liver enzymes, A/C anemia   Does the patient have one of the following disease processes/diagnoses(primary or secondary)?  General Surgery   Does the patient have Home health ordered?  No   Is there a DME ordered?  No   Comments regarding appointments  See AVS   Prep survey completed?  Yes          Blanca Vanegas RN

## 2019-05-16 ENCOUNTER — READMISSION MANAGEMENT (OUTPATIENT)
Dept: CALL CENTER | Facility: HOSPITAL | Age: 59
End: 2019-05-16

## 2019-05-16 NOTE — OUTREACH NOTE
General Surgery Week 1 Survey      Responses   Facility patient discharged from?  [facility]   Does the patient have one of the following disease processes/diagnoses(primary or secondary)?  General Surgery   Is there a successful TCM telephone encounter documented?  No   Week 1 attempt successful?  Yes   Call start time  0951   Rescheduled  Rescheduled-pt requested   Call end time  0951          Mariana Thacker RN

## 2019-05-17 ENCOUNTER — RESULTS ENCOUNTER (OUTPATIENT)
Dept: ONCOLOGY | Facility: CLINIC | Age: 59
End: 2019-05-17

## 2019-05-17 ENCOUNTER — READMISSION MANAGEMENT (OUTPATIENT)
Dept: CALL CENTER | Facility: HOSPITAL | Age: 59
End: 2019-05-17

## 2019-05-17 DIAGNOSIS — K74.60 CIRRHOSIS OF LIVER WITH ASCITES, UNSPECIFIED HEPATIC CIRRHOSIS TYPE (HCC): ICD-10-CM

## 2019-05-17 DIAGNOSIS — K74.69 OTHER CIRRHOSIS OF LIVER (HCC): ICD-10-CM

## 2019-05-17 DIAGNOSIS — R18.8 CIRRHOSIS OF LIVER WITH ASCITES, UNSPECIFIED HEPATIC CIRRHOSIS TYPE (HCC): ICD-10-CM

## 2019-05-17 DIAGNOSIS — D69.6 THROMBOCYTOPENIA (HCC): ICD-10-CM

## 2019-05-17 DIAGNOSIS — I85.10 SECONDARY ESOPHAGEAL VARICES WITHOUT BLEEDING (HCC): ICD-10-CM

## 2019-05-17 DIAGNOSIS — K76.6 PORTAL HYPERTENSION (HCC): ICD-10-CM

## 2019-05-17 DIAGNOSIS — D50.9 IRON DEFICIENCY ANEMIA, UNSPECIFIED IRON DEFICIENCY ANEMIA TYPE: ICD-10-CM

## 2019-05-17 NOTE — OUTREACH NOTE
General Surgery Week 1 Survey      Responses   Facility patient discharged from?  [place]   Does the patient have one of the following disease processes/diagnoses(primary or secondary)?  General Surgery   Is there a successful TCM telephone encounter documented?  No   Week 1 attempt successful?  Yes   Call start time  1750   Call end time  1757   Discharge diagnosis  Acute GI bleed, esophageal varices s/p banding 05/10/19, nonalcoholic steatohepatitis, Acute blood loss anemia, Hyponatremia, Hyperkalemia, APRIL, IDDM II, severe hyperglycemia, elevated liver enzymes, A/C anemia   Meds reviewed with patient/caregiver?  Yes   Is the patient having any side effects they believe may be caused by any medication additions or changes?  No   Does the patient have all medications related to this admission filled (includes all antibiotics, pain medications, etc.)  Yes   Is the patient taking all medications as directed (includes completed medication regime)?  Yes   Medication comments  BS have been up and down, poor appetite   Does the patient have a follow up appointment scheduled with their surgeon?  Yes   Has the patient kept scheduled appointments due by today?  N/A   Comments  Not quite ready for transplant at this time she says.   Has home health visited the patient within 72 hours of discharge?  N/A   Psychosocial issues?  No   Did the patient receive a copy of their discharge instructions?  Yes   Nursing interventions  Reviewed instructions with patient   What is the patient's perception of their health status since discharge?  Improving   Nursing interventions  Nurse provided patient education   Is the patient /caregiver able to teach back basic post-op care?  Continue use of incentive spirometry at least 1 week post discharge, Practice 'cough and deep breath', Lifting as instructed by MD in discharge instructions, Do not remove steri-strips, Keep incision areas clean,dry and protected   Is the patient/caregiver able  to teach back signs and symptoms of incisional infection?  Increased redness, swelling or pain at the incisonal site, Increased drainage or bleeding, Incisional warmth, Pus or odor from incision, Fever   Is the patient/caregiver able to teach back steps to recovery at home?  Set small, achievable goals for return to baseline health, Rest and rebuild strength, gradually increase activity, Make a list of questions for surgeon's appointment, Eat a well-balance diet   Is the patient/caregiver able to teach back the hierarchy of who to call/visit for symptoms/problems? PCP, Specialist, Home health nurse, Urgent Care, ED, 911  Yes   Additional teach back comments  She reports using Boost most days for protein, has a poor appetite, says her abdomen is swollen.  Says she has low energy from the blood loss.   Week 1 call completed?  Yes          Katheryn Israel RN

## 2019-05-21 ENCOUNTER — TELEPHONE (OUTPATIENT)
Dept: ONCOLOGY | Facility: HOSPITAL | Age: 59
End: 2019-05-21

## 2019-05-21 NOTE — TELEPHONE ENCOUNTER
----- Message from Rocío Kelly sent at 5/21/2019 11:43 AM EDT -----  Contact: [phone]  Pt is in [facility] and cannot come in for her shot today      Attempted to call pt. No answer, mailbox is full and unable to accept messages.

## 2019-05-24 ENCOUNTER — READMISSION MANAGEMENT (OUTPATIENT)
Dept: CALL CENTER | Facility: HOSPITAL | Age: 59
End: 2019-05-24

## 2019-05-24 NOTE — OUTREACH NOTE
General Surgery Week 2 Survey      Responses   Facility patient discharged from?  [place]   Does the patient have one of the following disease processes/diagnoses(primary or secondary)?  General Surgery   Week 2 attempt successful?  No   Unsuccessful attempts  Attempt 1          Princess Cid RN

## 2019-05-28 ENCOUNTER — TELEPHONE (OUTPATIENT)
Dept: GASTROENTEROLOGY | Facility: CLINIC | Age: 59
End: 2019-05-28

## 2019-05-28 ENCOUNTER — READMISSION MANAGEMENT (OUTPATIENT)
Dept: CALL CENTER | Facility: HOSPITAL | Age: 59
End: 2019-05-28

## 2019-05-28 NOTE — TELEPHONE ENCOUNTER
Patient called she has been in out of the Hospital. a t [facility]   and [facility]. She is experncing swelling that comes to the top of her thighs. It just started. Advised her to go to ER. She said she will go to [facility] ER. She will keep us posted. As to what they find out.

## 2019-05-28 NOTE — OUTREACH NOTE
General Surgery Week 2 Survey      Responses   Facility patient discharged from?  [place]   Does the patient have one of the following disease processes/diagnoses(primary or secondary)?  General Surgery   Week 2 attempt successful?  Yes   Call start time  1149   Revoke  Readmitted   Call end time  1150   Discharge diagnosis  Acute GI bleed, esophageal varices s/p banding 05/10/19, nonalcoholic steatohepatitis, Acute blood loss anemia, Hyponatremia, Hyperkalemia, APRIL, IDDM II, severe hyperglycemia, elevated liver enzymes, A/C anemia          Ramona Chen RN

## 2019-06-10 ENCOUNTER — OFFICE VISIT (OUTPATIENT)
Dept: FAMILY MEDICINE CLINIC | Facility: CLINIC | Age: 59
End: 2019-06-10

## 2019-06-10 VITALS
HEIGHT: 66 IN | WEIGHT: 166 LBS | OXYGEN SATURATION: 99 % | SYSTOLIC BLOOD PRESSURE: 110 MMHG | DIASTOLIC BLOOD PRESSURE: 64 MMHG | TEMPERATURE: 98.6 F | BODY MASS INDEX: 26.68 KG/M2 | RESPIRATION RATE: 16 BRPM | HEART RATE: 111 BPM

## 2019-06-10 DIAGNOSIS — Z09 HOSPITAL DISCHARGE FOLLOW-UP: ICD-10-CM

## 2019-06-10 DIAGNOSIS — K75.81 NASH (NONALCOHOLIC STEATOHEPATITIS): Primary | ICD-10-CM

## 2019-06-10 DIAGNOSIS — K74.60 CIRRHOSIS OF LIVER NOT DUE TO ALCOHOL (HCC): ICD-10-CM

## 2019-06-10 DIAGNOSIS — K76.82 HEPATIC ENCEPHALOPATHY (HCC): ICD-10-CM

## 2019-06-10 PROCEDURE — 99214 OFFICE O/P EST MOD 30 MIN: CPT | Performed by: NURSE PRACTITIONER

## 2019-06-10 RX ORDER — FUROSEMIDE 80 MG
80 TABLET ORAL DAILY PRN
COMMUNITY
End: 2021-09-20 | Stop reason: ALTCHOICE

## 2019-06-10 NOTE — PROGRESS NOTES
Subjective   Silvia Zabala is a 58 y.o. female.     History of Present Illness   Silvia Zabala 58 y.o. female presents today for hosptial follow up.  she was treated [facility] for DUKES and cirrhosis.  I reviewed all of the labs and diagnostic testing.     Current outpatient and discharge medications have been reconciled for the patient.  Reviewed by: JOEL Garcia    she does have a follow up appointment with a specialist:     Patient states she is still feeling fatigued and has limited appetite since hospital discharge. She is taking the oxycodone BID and promethazine as needed.  She has reduced her furosemide back down to 80 mg daily from the 120 mg in the hospital.  She is weighing herself daily.  She denies any weight gain or lower extremity edema. She was changed from Linzess to Myrbetric for constipation at hospital discharge.   She has had loose stool each day. Reports 1-3 per day but in small amounts. She is using low sugar Boost with each meal and 2 at night.  Patient states she is back on her diabetic medications she was taking prior to hospital visit. She has appt with Dr. Burnett on 7/1/19.  She states she has been staying under 200 on glucose which she states is good for her as she has previously been in 600s. States she is still testing 3-4 times per day.     The following portions of the patient's history were reviewed and updated as appropriate: allergies, current medications, past family history, past medical history, past social history, past surgical history and problem list.    Review of Systems   Constitutional: Positive for activity change, appetite change and fatigue. Negative for unexpected weight change.   Respiratory: Negative for cough and shortness of breath.    Cardiovascular: Negative for chest pain, palpitations and leg swelling.   Gastrointestinal: Negative for abdominal distention, abdominal pain, constipation, diarrhea and nausea.   Psychiatric/Behavioral:  Negative for behavioral problems.       Objective   Physical Exam   Constitutional: She is oriented to person, place, and time. She appears well-developed and well-nourished.   Cardiovascular: Normal rate and regular rhythm.   Negative for lower extremity edema    Pulmonary/Chest: Effort normal and breath sounds normal.   Neurological: She is alert and oriented to person, place, and time.   Psychiatric: She has a normal mood and affect. Judgment normal.   Nursing note and vitals reviewed.      Assessment/Plan   Silvia was seen today for hospital follow up.    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     Will reschedule appt with Dr. Tobin as she missed it during the hospital stay.   Will schedule f/u with Dr. Coleman.

## 2019-06-14 ENCOUNTER — TELEPHONE (OUTPATIENT)
Dept: GASTROENTEROLOGY | Facility: CLINIC | Age: 59
End: 2019-06-14

## 2019-06-14 ENCOUNTER — RESULTS ENCOUNTER (OUTPATIENT)
Dept: ONCOLOGY | Facility: CLINIC | Age: 59
End: 2019-06-14

## 2019-06-14 DIAGNOSIS — D69.6 THROMBOCYTOPENIA (HCC): ICD-10-CM

## 2019-06-14 DIAGNOSIS — K74.60 CIRRHOSIS OF LIVER WITH ASCITES, UNSPECIFIED HEPATIC CIRRHOSIS TYPE (HCC): ICD-10-CM

## 2019-06-14 DIAGNOSIS — K76.6 PORTAL HYPERTENSION (HCC): ICD-10-CM

## 2019-06-14 DIAGNOSIS — I85.10 SECONDARY ESOPHAGEAL VARICES WITHOUT BLEEDING (HCC): ICD-10-CM

## 2019-06-14 DIAGNOSIS — D50.9 IRON DEFICIENCY ANEMIA, UNSPECIFIED IRON DEFICIENCY ANEMIA TYPE: ICD-10-CM

## 2019-06-14 DIAGNOSIS — K74.69 OTHER CIRRHOSIS OF LIVER (HCC): ICD-10-CM

## 2019-06-14 DIAGNOSIS — R18.8 CIRRHOSIS OF LIVER WITH ASCITES, UNSPECIFIED HEPATIC CIRRHOSIS TYPE (HCC): ICD-10-CM

## 2019-06-17 ENCOUNTER — PREP FOR SURGERY (OUTPATIENT)
Dept: OTHER | Facility: HOSPITAL | Age: 59
End: 2019-06-17

## 2019-06-17 DIAGNOSIS — I85.01 ESOPHAGEAL VARICES WITH BLEEDING (HCC): Primary | ICD-10-CM

## 2019-06-17 RX ORDER — SODIUM CHLORIDE, SODIUM LACTATE, POTASSIUM CHLORIDE, CALCIUM CHLORIDE 600; 310; 30; 20 MG/100ML; MG/100ML; MG/100ML; MG/100ML
30 INJECTION, SOLUTION INTRAVENOUS CONTINUOUS
Status: CANCELLED | OUTPATIENT
Start: 2019-06-28

## 2019-06-19 ENCOUNTER — OFFICE VISIT (OUTPATIENT)
Dept: ONCOLOGY | Facility: CLINIC | Age: 59
End: 2019-06-19

## 2019-06-19 ENCOUNTER — LAB (OUTPATIENT)
Dept: LAB | Facility: HOSPITAL | Age: 59
End: 2019-06-19

## 2019-06-19 VITALS
RESPIRATION RATE: 16 BRPM | HEART RATE: 92 BPM | TEMPERATURE: 98.8 F | OXYGEN SATURATION: 94 % | DIASTOLIC BLOOD PRESSURE: 71 MMHG | SYSTOLIC BLOOD PRESSURE: 113 MMHG | HEIGHT: 66 IN | BODY MASS INDEX: 26.81 KG/M2

## 2019-06-19 DIAGNOSIS — D50.9 IRON DEFICIENCY ANEMIA, UNSPECIFIED IRON DEFICIENCY ANEMIA TYPE: ICD-10-CM

## 2019-06-19 DIAGNOSIS — K76.6 PORTAL HYPERTENSION (HCC): ICD-10-CM

## 2019-06-19 DIAGNOSIS — D73.1 HYPERSPLENISM: ICD-10-CM

## 2019-06-19 DIAGNOSIS — K74.60 CIRRHOSIS OF LIVER WITH ASCITES, UNSPECIFIED HEPATIC CIRRHOSIS TYPE (HCC): ICD-10-CM

## 2019-06-19 DIAGNOSIS — R18.8 CIRRHOSIS OF LIVER WITH ASCITES, UNSPECIFIED HEPATIC CIRRHOSIS TYPE (HCC): ICD-10-CM

## 2019-06-19 DIAGNOSIS — D69.6 THROMBOCYTOPENIA (HCC): ICD-10-CM

## 2019-06-19 DIAGNOSIS — D69.3 ACUTE ITP (HCC): ICD-10-CM

## 2019-06-19 DIAGNOSIS — I85.11 SECONDARY ESOPHAGEAL VARICES WITH BLEEDING (HCC): ICD-10-CM

## 2019-06-19 DIAGNOSIS — K75.81 NASH (NONALCOHOLIC STEATOHEPATITIS): ICD-10-CM

## 2019-06-19 DIAGNOSIS — I85.10 SECONDARY ESOPHAGEAL VARICES WITHOUT BLEEDING (HCC): ICD-10-CM

## 2019-06-19 DIAGNOSIS — D61.818 PANCYTOPENIA (HCC): Primary | ICD-10-CM

## 2019-06-19 DIAGNOSIS — K74.69 OTHER CIRRHOSIS OF LIVER (HCC): ICD-10-CM

## 2019-06-19 LAB
BASOPHILS # BLD AUTO: 0.02 10*3/MM3 (ref 0–0.2)
BASOPHILS NFR BLD AUTO: 0.6 % (ref 0–1.5)
DEPRECATED RDW RBC AUTO: 46.9 FL (ref 37–54)
EOSINOPHIL # BLD AUTO: 0.06 10*3/MM3 (ref 0–0.4)
EOSINOPHIL NFR BLD AUTO: 1.9 % (ref 0.3–6.2)
ERYTHROCYTE [DISTWIDTH] IN BLOOD BY AUTOMATED COUNT: 15.6 % (ref 12.3–15.4)
FERRITIN SERPL-MCNC: 30.7 NG/ML (ref 11–207)
HCT VFR BLD AUTO: 34.7 % (ref 34–46.6)
HGB BLD-MCNC: 11.1 G/DL (ref 12–15.9)
IMM GRANULOCYTES # BLD AUTO: 0.02 10*3/MM3 (ref 0–0.05)
IMM GRANULOCYTES NFR BLD AUTO: 0.6 % (ref 0–0.5)
IRON 24H UR-MRATE: 27 MCG/DL (ref 37–145)
IRON SATN MFR SERPL: 6 % (ref 14–48)
LYMPHOCYTES # BLD AUTO: 0.36 10*3/MM3 (ref 0.7–3.1)
LYMPHOCYTES NFR BLD AUTO: 11.4 % (ref 19.6–45.3)
MCH RBC QN AUTO: 26.5 PG (ref 26.6–33)
MCHC RBC AUTO-ENTMCNC: 32 G/DL (ref 31.5–35.7)
MCV RBC AUTO: 82.8 FL (ref 79–97)
MONOCYTES # BLD AUTO: 0.28 10*3/MM3 (ref 0.1–0.9)
MONOCYTES NFR BLD AUTO: 8.9 % (ref 5–12)
NEUTROPHILS # BLD AUTO: 2.42 10*3/MM3 (ref 1.7–7)
NEUTROPHILS NFR BLD AUTO: 76.6 % (ref 42.7–76)
NRBC BLD AUTO-RTO: 0 /100 WBC (ref 0–0.2)
PLATELET # BLD AUTO: 67 10*3/MM3 (ref 140–450)
PLATELETS.RETICULATED NFR BLD AUTO: 8.2 % (ref 1.1–6.1)
PMV BLD AUTO: 11.5 FL (ref 6–12)
RBC # BLD AUTO: 4.19 10*6/MM3 (ref 3.77–5.28)
TIBC SERPL-MCNC: 442 MCG/DL (ref 249–505)
TRANSFERRIN SERPL-MCNC: 316 MG/DL (ref 200–360)
WBC NRBC COR # BLD: 3.16 10*3/MM3 (ref 3.4–10.8)

## 2019-06-19 PROCEDURE — 85025 COMPLETE CBC W/AUTO DIFF WBC: CPT | Performed by: INTERNAL MEDICINE

## 2019-06-19 PROCEDURE — 99214 OFFICE O/P EST MOD 30 MIN: CPT | Performed by: INTERNAL MEDICINE

## 2019-06-19 PROCEDURE — 85055 RETICULATED PLATELET ASSAY: CPT | Performed by: INTERNAL MEDICINE

## 2019-06-19 PROCEDURE — 36415 COLL VENOUS BLD VENIPUNCTURE: CPT | Performed by: INTERNAL MEDICINE

## 2019-06-19 PROCEDURE — 84466 ASSAY OF TRANSFERRIN: CPT | Performed by: INTERNAL MEDICINE

## 2019-06-19 PROCEDURE — 82728 ASSAY OF FERRITIN: CPT | Performed by: INTERNAL MEDICINE

## 2019-06-19 PROCEDURE — 83540 ASSAY OF IRON: CPT | Performed by: INTERNAL MEDICINE

## 2019-06-19 NOTE — PROGRESS NOTES
REASON FOR FOLLOWUP:    1. 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   2. Evidence of B12 deficiency on her initial lab evaluation in April 2013.  Patient was started on parenteral B12 replacement.  The patient was switched from shots to daily oral B12 replacement after the visit of 12/02/2013.    3. Hemoglobin has significantly improved since banding of her esophageal varices and increase in her oral iron supplement to twice daily dosing.  4. Morphologically normal bone marrow from 08/20/2013 with normal flow cytometry.  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  Significance of this is uncertain.    5. Mixed connective tissue disorder, currently on Enbrel therapy.  6. Insulin-requiring diabetes mellitus.  7. Patient continues to follow-up periodically with the transplant team at [facility] but currently she is not on the transplant list.  8. 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 Rituxan therapy weekly ×4 completed 7/16/2018     HISTORY OF PRESENT ILLNESS:  Silvia is a 58 y.o. 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  She has had GI bleeding in the past due to portal hypertension.      She also developed acute ITP and received Rituxan weekly ×4.  She completed her Rituxan treatment on 7/16/2018.  She had a good response.  Her platelet count did not correct to normal but did return to her baseline platelet count of around 60,000-90,000.    She also had received 2 doses of IV iron therapy in the form of Injectafer 750 mg each dose delivered on 1/29/2019 and 2/13/2019.      Since her last visit here, Silvia again required hospitalization at [facility] from 5/28/2019 to 5/30/2019 due to symptomatic edema requiring diuretic therapy.  She also had been hospitalized at [facility] from 5/9/2019 to 5/14/2019 and  "on that admission she underwent an esophageal varices banding procedure on 5/10/2019.      Past Medical History, Past Surgical History, Social History, Family History have been reviewed and are without significant changes except as mentioned.    Review of Systems   Review of Systems   Constitutional: Positive for fatigue and fever. Negative for activity change.   HENT: Negative for mouth sores, nosebleeds and trouble swallowing.    Respiratory: Negative for cough, shortness of breath and wheezing.    Cardiovascular: Negative for chest pain and palpitations.   Gastrointestinal: Positive for constipation. Negative for diarrhea and nausea.   Genitourinary: Negative for dysuria and hematuria.   Musculoskeletal: Negative for arthralgias and myalgias.   Skin: Negative for rash and wound.   Neurological: Negative for seizures and syncope.   Hematological: Negative for adenopathy. Bruises/bleeds easily.   Psychiatric/Behavioral: Negative for confusion.       Medications:  The current medication list was reviewed in the EMR    ALLERGIES:    Allergies   Allergen Reactions   • Albuterol Anaphylaxis   • Tramadol Nausea Only, Other (See Comments) and GI Intolerance     Per pt causes her \"palsy, meaning shaking and tremors\"    • Lactulose Nausea And Vomiting and Other (See Comments)     Severe abdominal pain   • Quinine Derivatives Nausea And Vomiting              Vitals:    06/19/19 1140   Height: 167.6 cm (65.98\")     Physical Exam    CONSTITUTIONAL:  Vital signs reviewed.  No distress, looks comfortable.  EYES:  Conjunctiva and lids unremarkable.  PERRLA  EARS,NOSE,MOUTH,THROAT:  Ears and nose appear unremarkable.  Lips, teeth, gums appear unremarkable.  RESPIRATORY:  Normal respiratory effort.  Lungs clear to auscultation bilaterally.  CARDIOVASCULAR:  Normal S1, S2.  No murmurs rubs or gallops.  No significant lower extremity edema.  GASTROINTESTINAL: Abdomen appears unremarkable.  Nontender.  No hepatomegaly.  No " splenomegaly.  MUSCULOSKELETAL:  Unremarkable digits/nails.  No cyanosis or clubbing.  SKIN:  Warm.  No rashes.  PSYCHIATRIC:  Normal judgment and insight.  Normal mood and affect.       RECENT LABS:  WBC   Date Value Ref Range Status   06/19/2019 3.16 (L) 3.40 - 10.80 10*3/mm3 Final     Hemoglobin   Date Value Ref Range Status   06/19/2019 11.1 (L) 12.0 - 15.9 g/dL Final     Platelets   Date Value Ref Range Status   06/19/2019 67 (L) 140 - 450 10*3/mm3 Final     Lab Results   Component Value Date    NEUTROABS 2.42 06/19/2019                 ASSESSMENT/PLAN:     *ITP.  Peripheral smear showed no significant amount of schistocytes and no platelet clumping.  IPF improved to 5.3% after Rituxan.  She received 5 daily doses of IVIG in the hospital and was discharged on prednisone 60 mg daily.   Her platelets now seem to be back at her baseline in the 50,000 100,000 range from hypersplenism.    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     *B12 deficiency.  On oral B12 since December 2013.     *History of esophageal varices banding.     *Iron deficiency anemia due to GI bleeding.  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  This is of unknown significance.     *Rheumatoid arthritis.       *Lupus     *Diabetes.  Hyperglycemia from steroids may be a problem.       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  She currently is having some issues with large volume ascites.  She remains on a transplant watch list.        Plan  · Silvia will continue to have her labs checked monthly in our office with RN review to monitor her anemia and platelet count.  · MD follow-up in 4 months.  · She remains on the liver transplant watch list at [facility].  · She will be undergoing further upper endoscopy to evaluate her  esophageal varices with Dr. Coleman in the next few weeks.  · We will review her iron studies when they become available.  If she is again iron deficient from GI bleeding we can arrange outpatient IV Injectafer which she responded well to in the past.

## 2019-06-21 ENCOUNTER — TELEPHONE (OUTPATIENT)
Dept: ONCOLOGY | Facility: CLINIC | Age: 59
End: 2019-06-21

## 2019-06-21 RX ORDER — PROCHLORPERAZINE MALEATE 10 MG
10 TABLET ORAL ONCE
Status: CANCELLED
Start: 2019-06-26 | End: 2019-06-26

## 2019-06-21 RX ORDER — SODIUM CHLORIDE 9 MG/ML
250 INJECTION, SOLUTION INTRAVENOUS ONCE
Status: CANCELLED | OUTPATIENT
Start: 2019-06-26

## 2019-06-21 NOTE — PROGRESS NOTES
Supportive plan for Injectafer 750 mg IV x 2 doses with Compazine 10 mg PO as premedication with each dose V.O. Dr. Mcdowell

## 2019-06-21 NOTE — TELEPHONE ENCOUNTER
----- Message from Sukhdeep Mcdowell MD sent at 6/20/2019  5:45 PM EDT -----  Please arrange 2 doses of Injectafer 750 mg each for Ms Vj Horvath

## 2019-06-26 ENCOUNTER — INFUSION (OUTPATIENT)
Dept: ONCOLOGY | Facility: HOSPITAL | Age: 59
End: 2019-06-26

## 2019-06-26 VITALS
DIASTOLIC BLOOD PRESSURE: 76 MMHG | TEMPERATURE: 98.9 F | SYSTOLIC BLOOD PRESSURE: 124 MMHG | WEIGHT: 186.2 LBS | HEART RATE: 105 BPM | BODY MASS INDEX: 30.07 KG/M2

## 2019-06-26 DIAGNOSIS — D50.9 IRON DEFICIENCY ANEMIA, UNSPECIFIED IRON DEFICIENCY ANEMIA TYPE: Primary | ICD-10-CM

## 2019-06-26 DIAGNOSIS — IMO0001 ADVERSE EFFECT OF IRON OR ITS COMPOUND, SUBSEQUENT ENCOUNTER: ICD-10-CM

## 2019-06-26 PROCEDURE — 96374 THER/PROPH/DIAG INJ IV PUSH: CPT | Performed by: INTERNAL MEDICINE

## 2019-06-26 PROCEDURE — 63710000001 PROCHLORPERAZINE MALEATE PER 5 MG: Performed by: INTERNAL MEDICINE

## 2019-06-26 PROCEDURE — 25010000002 FERRIC CARBOXYMALTOSE 750 MG/15ML SOLUTION 15 ML VIAL: Performed by: INTERNAL MEDICINE

## 2019-06-26 RX ORDER — SODIUM CHLORIDE 9 MG/ML
250 INJECTION, SOLUTION INTRAVENOUS ONCE
Status: COMPLETED | OUTPATIENT
Start: 2019-06-26 | End: 2019-06-26

## 2019-06-26 RX ORDER — PROCHLORPERAZINE MALEATE 5 MG/1
10 TABLET ORAL ONCE
Status: COMPLETED | OUTPATIENT
Start: 2019-06-26 | End: 2019-06-26

## 2019-06-26 RX ADMIN — FERRIC CARBOXYMALTOSE INJECTION 750 MG: 50 INJECTION, SOLUTION INTRAVENOUS at 15:16

## 2019-06-26 RX ADMIN — PROCHLORPERAZINE MALEATE 10 MG: 5 TABLET ORAL at 15:09

## 2019-06-26 RX ADMIN — SODIUM CHLORIDE 250 ML: 9 INJECTION, SOLUTION INTRAVENOUS at 15:09

## 2019-06-28 ENCOUNTER — ANESTHESIA (OUTPATIENT)
Dept: GASTROENTEROLOGY | Facility: HOSPITAL | Age: 59
End: 2019-06-28

## 2019-06-28 ENCOUNTER — HOSPITAL ENCOUNTER (OUTPATIENT)
Facility: HOSPITAL | Age: 59
Setting detail: HOSPITAL OUTPATIENT SURGERY
Discharge: HOME | End: 2019-06-28
Attending: INTERNAL MEDICINE | Admitting: INTERNAL MEDICINE

## 2019-06-28 ENCOUNTER — ANESTHESIA EVENT (OUTPATIENT)
Dept: GASTROENTEROLOGY | Facility: HOSPITAL | Age: 59
End: 2019-06-28

## 2019-06-28 VITALS
RESPIRATION RATE: 16 BRPM | TEMPERATURE: 98.3 F | SYSTOLIC BLOOD PRESSURE: 134 MMHG | OXYGEN SATURATION: 96 % | DIASTOLIC BLOOD PRESSURE: 75 MMHG | BODY MASS INDEX: 29.51 KG/M2 | HEIGHT: 67 IN | WEIGHT: 188 LBS | HEART RATE: 106 BPM

## 2019-06-28 DIAGNOSIS — I85.01 ESOPHAGEAL VARICES WITH BLEEDING (HCC): ICD-10-CM

## 2019-06-28 LAB — GLUCOSE BLDC GLUCOMTR-MCNC: 163 MG/DL (ref 70–130)

## 2019-06-28 PROCEDURE — 25010000002 PROPOFOL 10 MG/ML EMULSION: Performed by: ANESTHESIOLOGY

## 2019-06-28 PROCEDURE — 43244 EGD VARICES LIGATION: CPT | Performed by: INTERNAL MEDICINE

## 2019-06-28 PROCEDURE — 82962 GLUCOSE BLOOD TEST: CPT

## 2019-06-28 RX ORDER — SODIUM CHLORIDE, SODIUM LACTATE, POTASSIUM CHLORIDE, CALCIUM CHLORIDE 600; 310; 30; 20 MG/100ML; MG/100ML; MG/100ML; MG/100ML
30 INJECTION, SOLUTION INTRAVENOUS CONTINUOUS
Status: DISCONTINUED | OUTPATIENT
Start: 2019-06-28 | End: 2019-06-28 | Stop reason: HOSPADM

## 2019-06-28 RX ORDER — PROPOFOL 10 MG/ML
VIAL (ML) INTRAVENOUS CONTINUOUS PRN
Status: DISCONTINUED | OUTPATIENT
Start: 2019-06-28 | End: 2019-06-28 | Stop reason: SURG

## 2019-06-28 RX ORDER — FERROUS SULFATE 325(65) MG
325 TABLET ORAL
COMMUNITY
End: 2020-12-10

## 2019-06-28 RX ORDER — LIDOCAINE HYDROCHLORIDE 20 MG/ML
INJECTION, SOLUTION INFILTRATION; PERINEURAL AS NEEDED
Status: DISCONTINUED | OUTPATIENT
Start: 2019-06-28 | End: 2019-06-28 | Stop reason: SURG

## 2019-06-28 RX ORDER — PROPOFOL 10 MG/ML
VIAL (ML) INTRAVENOUS AS NEEDED
Status: DISCONTINUED | OUTPATIENT
Start: 2019-06-28 | End: 2019-06-28 | Stop reason: SURG

## 2019-06-28 RX ADMIN — EPHEDRINE SULFATE 10 MG: 50 INJECTION INTRAMUSCULAR; INTRAVENOUS; SUBCUTANEOUS at 09:20

## 2019-06-28 RX ADMIN — LIDOCAINE HYDROCHLORIDE 50 MG: 20 INJECTION, SOLUTION INFILTRATION; PERINEURAL at 09:09

## 2019-06-28 RX ADMIN — SODIUM CHLORIDE, POTASSIUM CHLORIDE, SODIUM LACTATE AND CALCIUM CHLORIDE 30 ML/HR: 600; 310; 30; 20 INJECTION, SOLUTION INTRAVENOUS at 08:12

## 2019-06-28 RX ADMIN — PROPOFOL 140 MCG/KG/MIN: 10 INJECTION, EMULSION INTRAVENOUS at 09:09

## 2019-06-28 RX ADMIN — PROPOFOL 125 MG: 10 INJECTION, EMULSION INTRAVENOUS at 09:09

## 2019-06-28 NOTE — ANESTHESIA POSTPROCEDURE EVALUATION
Patient: Silvia Zabala    Procedure Summary     Date:  06/28/19 Room / Location:   [facility]    Anesthesia Start:  0903 Anesthesia Stop:  0926    Procedure:  ESOPHAGOGASTRODUODENOSCOPY WITH ESOPHAGEAL BANDING (3 BANDS) (N/A Esophagus) Diagnosis:       Esophageal varices with bleeding (CMS/HCC)      (Esophageal varices with bleeding (CMS/HCC) [I85.01])    Surgeon:  Rich Coleman MD Provider:  Kervin Briceno MD    Anesthesia Type:  MAC ASA Status:  3          Anesthesia Type: MAC  Last vitals  BP   128/72 (06/28/19 0935)   Temp   36.8 °C (98.3 °F) (06/28/19 0742)   Pulse   105 (06/28/19 0935)   Resp   16 (06/28/19 0935)     SpO2   98 % (06/28/19 0935)     Post Anesthesia Care and Evaluation    Patient location during evaluation: bedside  Patient participation: complete - patient participated  Level of consciousness: awake and alert  Pain score: 0  Pain management: adequate  Airway patency: patent  Anesthetic complications: No anesthetic complications  PONV Status: none  Cardiovascular status: acceptable  Respiratory status: acceptable  Hydration status: acceptable  Post Neuraxial Block status: Motor and sensory function returned to baseline

## 2019-07-01 ENCOUNTER — OFFICE VISIT (OUTPATIENT)
Dept: ENDOCRINOLOGY | Age: 59
End: 2019-07-01

## 2019-07-01 VITALS
WEIGHT: 187 LBS | HEART RATE: 105 BPM | OXYGEN SATURATION: 99 % | SYSTOLIC BLOOD PRESSURE: 124 MMHG | DIASTOLIC BLOOD PRESSURE: 72 MMHG | HEIGHT: 67 IN | BODY MASS INDEX: 29.35 KG/M2

## 2019-07-01 DIAGNOSIS — IMO0002 TYPE 2 DIABETES MELLITUS, UNCONTROLLED, WITH NEUROPATHY: Primary | ICD-10-CM

## 2019-07-01 DIAGNOSIS — K31.84 GASTROPARESIS: ICD-10-CM

## 2019-07-01 DIAGNOSIS — E11.42 DIABETIC PERIPHERAL NEUROPATHY (HCC): ICD-10-CM

## 2019-07-01 PROCEDURE — 99214 OFFICE O/P EST MOD 30 MIN: CPT | Performed by: INTERNAL MEDICINE

## 2019-07-01 NOTE — PROGRESS NOTES
58 y.o.    Patient Care Team:  Kelly Flores APRN as PCP - General (Family Medicine)  Rich Coleman MD as PCP - Claims Attributed  Sukhdeep Mcdowell MD as Consulting Physician (Hematology and Oncology)  Blanca Pitts MD as Referring Physician (Internal Medicine)  Rich Coleman MD as Consulting Physician (Gastroenterology)  Merary Burnett MD as Consulting Physician (Endocrinology)  Kervin Rajput Jr., MD as Consulting Physician (Hematology and Oncology)    Chief Complaint:    HOSPITAL FOLLOW UP/ TYPE 2 DIABETES MELLITUS   Subjective     HPI       58-year-old white female with a complicated past medical history has been doing poorly in terms of her type 2 diabetes mellitus, gastroparesis and ITP.  Patient has multiple hospitalizations either because of gastroparesis or because of the uncontrolled blood sugars.  Last hospitalization in June 2019 secondary to gastroparesis and anemia.     Type 2 diabetes mellitus-diagnosed in her 20s.  She has been on insulin since then.  Today in clinic patient reports that she is on Lantus 80 units twice daily, Humalog 60 units with each meal.  Checks her blood sugars at least 4-6 times a day.  She has been taking her Humalog even if she is not eating much as her blood sugars remain elevated.  No significantly low blood sugars, highest blood sugar was around 300s.  Increased urination and thirst reported.  Last eye examination was in 2017, no history of diabetic retinopathy at that time.  Does complain of diabetic neuropathy.  Diet is poorly controlled due to the history of gastroparesis.      Patient is really interested in continuous glucose monitoring as it helps her to monitor her blood glucose trends and to increase the insulin regimen if necessary.  But it was not approved by the insurance.     Pancreatic divisum : No hx of pancreatitis per pt.       Liver disease/DUKES -  Sees Dr. Jordan from [facility].       Gastroparesis - Sees Dr. Coleman. Getting worse.  Not  a candidate for stimulator due to ITP      ITP - diagnosed in May 2018. Currently stable. Seen by Hematology and oncology.    No recent blood transfusion in the last 3 months.    Reviewed primary care physician's/consulting physician documentation and lab results :     Interval History      The following portions of the patient's history were reviewed and updated as appropriate: allergies, current medications, past family history, past medical history, past social history, past surgical history and problem list.    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   • Cirrhosis (CMS/HCC)    • Colon polyps    • Deafness    • Depression    • Diabetes mellitus (CMS/HCC)     Adult onset, insulin requiring   • Duodenal ulcer with hemorrhage    • Esophageal varices determined by endoscopy (CMS/HCC)    • Fibromyalgia    • Gallbladder disease    • Gastric varices    • Gastroparesis    • Glaucoma    • Granuloma annulare    • H/O B12 deficiency    • H/O Bleeding ulcer     And gastroesophageal varices   • H/O mixed connective tissue disorder    • Hemorrhoids    • Hiatal hernia    • History of transfusion    • Hx of being hospitalized     In [place] for GI bleeding from ulcer and varices   • Hyperlipidemia    • Migraine    • DUKES (nonalcoholic steatohepatitis) 11/2016   • Orthostatic hypotension 02/2019   • BRICE (obstructive sleep apnea)    • Pancreas divisum    • Pancytopenia (CMS/HCC)     Chronic, due to cirrhosis and hypersplenism   • Peptic ulcer disease     And esophageal varices   • PONV (postoperative nausea and vomiting)    • RA (rheumatoid arthritis) (CMS/HCC)    • Seizure disorder (CMS/HCC)     LAST ONE SEVERAL YEARS AGO   • Seizures (CMS/HCC)    • Systemic lupus (CMS/HCC)      Family History   Problem  "Relation Age of Onset   • Liver disease Other    • Depression Other    • Heart disease Other    • Hypertension Other    • Diabetes Other    • Breast cancer Other    • Brain cancer Other    • Uterine cancer Other    • Colon cancer Other    • Leukemia Other    • Colon cancer Maternal Aunt    • Hypertension Mother    • Diabetes type II Mother    • Rheum arthritis Mother    • Liver disease Mother         DUKES   • Heart disease Father    • Diabetes type II Father    • Diabetes type II Sister    • Lupus Sister    • Malig Hyperthermia Neg Hx      Social History     Socioeconomic History   • Marital status:      Spouse name: Jose   • Number of children: Not on file   • Years of education: Not on file   • Highest education level: Not on file   Occupational History     Employer: DISABLED   Tobacco Use   • Smoking status: Former Smoker     Packs/day: 0.00     Years: 0.00     Pack years: 0.00     Last attempt to quit: 12/17/2015     Years since quitting: 3.5   • Smokeless tobacco: Never Used   Substance and Sexual Activity   • Alcohol use: No   • Drug use: No   • Sexual activity: Yes     Partners: Male   Social History Narrative    Moved to [place] from [place]. She is currently medically disabled.     Allergies   Allergen Reactions   • Albuterol Anaphylaxis   • Tramadol Nausea Only, Other (See Comments) and GI Intolerance     Per pt causes her \"palsy, meaning shaking and tremors\"    • Lactulose Nausea And Vomiting and Other (See Comments)     Severe abdominal pain   • Quinine Derivatives Nausea And Vomiting       Current Outpatient Medications:   •  ALPRAZolam (XANAX) 0.5 MG tablet, Take 1 tablet by mouth 2 (Two) Times a Day As Needed for Anxiety. (Patient taking differently: Take 0.5 mg by mouth Daily.), Disp: 60 tablet, Rfl: 1  •  amitriptyline (ELAVIL) 50 MG tablet, Take 1 tablet by mouth Every Night., Disp: 30 tablet, Rfl: 2  •  cholecalciferol 1000 units tablet, Take 1,000 Units by mouth Daily., Disp: , Rfl: "   •  Continuous Blood Gluc Sensor (FREESTYLE YESIKA SENSOR SYSTEM), 1 Device Every 14 (Fourteen) Days., Disp: 2 each, Rfl: 3  •  cyclobenzaprine (FLEXERIL) 5 MG tablet, TAKE ONE TABLET BY MOUTH EVERY NIGHT AT BEDTIME, Disp: 30 tablet, Rfl: 5  •  DULoxetine (CYMBALTA) 30 MG capsule, Take 90 mg by mouth Daily., Disp: , Rfl:   •  estradiol (VAGIFEM) 10 MCG tablet vaginal tablet, Insert 1 tablet into the vagina 2 (Two) Times a Week., Disp: 8 tablet, Rfl: 11  •  ferrous sulfate 325 (65 FE) MG tablet, Take 325 mg by mouth Daily With Breakfast., Disp: , Rfl:   •  folic acid (FOLVITE) 1 MG tablet, Take 1 mg by mouth Daily., Disp: , Rfl:   •  furosemide (LASIX) 80 MG tablet, Take 80 mg by mouth Daily., Disp: , Rfl:   •  hydrOXYzine (ATARAX) 25 MG tablet, Take 50 mg by mouth 3 (Three) Times a Day As Needed for Itching., Disp: , Rfl:   •  Insulin Glargine (LANTUS SOLOSTAR) 100 UNIT/ML injection pen, Inject 70 units in the am and 70 units in the pm (Patient taking differently: 80 Units. Inject 80 units in the am and 80 units in the pm), Disp: 20 pen, Rfl: 1  •  Insulin Lispro (HUMALOG KWIKPEN) 100 UNIT/ML solution pen-injector, INJECT UP TO 60 UNITS THREE TIMES A DAY WITH MEAL, Disp: 20 pen, Rfl: 1  •  insulin lispro (humaLOG) 100 UNIT/ML injection, 12 units TID with meals, Disp: 30 mL, Rfl: 11  •  lansoprazole (FIRST) 3 MG/ML suspension oral suspension, Take 10 mL by mouth 2 (Two) Times a Day Before Meals for 90 days., Disp: 600 mL, Rfl: 2  •  levETIRAcetam (KEPPRA) 500 MG tablet, TAKE ONE TABLET BY MOUTH TWICE A DAY, Disp: 24 tablet, Rfl: 0  •  linaclotide (LINZESS) 290 MCG capsule capsule, Take 290 mcg by mouth Every Morning Before Breakfast., Disp: , Rfl:   •  Multiple Vitamins-Minerals (MULTIVITAMIN WITH MINERALS) tablet tablet, Take 1 tablet by mouth Daily., Disp: , Rfl:   •  OLANZapine (zyPREXA) 5 MG tablet, Take 5 mg by mouth Every Night., Disp: , Rfl:   •  OxyCODONE HCl (OXAYDO) 7.5 MG tablet , Take 7.5 mg by mouth  "Every 8 (Eight) Hours As Needed., Disp: , Rfl:   •  pantoprazole (PROTONIX) 40 MG EC tablet, TAKE ONE TABLET BY MOUTH DAILY, Disp: 30 tablet, Rfl: 2  •  polyethylene glycol (MIRALAX) powder, Take 17 g by mouth Daily., Disp: , Rfl:   •  pregabalin (LYRICA) 75 MG capsule, Take 75 mg by mouth 2 (Two) Times a Day., Disp: , Rfl:   •  promethazine (PHENERGAN) 25 MG tablet, Take 1 tablet by mouth Every 6 (Six) Hours As Needed for Nausea or Vomiting., Disp: 30 tablet, Rfl: 0  •  spironolactone (ALDACTONE) 100 MG tablet, TAKE ONE TABLET BY MOUTH DAILY, Disp: 30 tablet, Rfl: 2  •  traZODone (DESYREL) 100 MG tablet, Take 1/2 to 1 tablet at bedtime as needed for insomnia, Disp: 30 tablet, Rfl: 2  •  vitamin B-12 (CYANOCOBALAMIN) 1000 MCG tablet, Take 1,000 mcg by mouth daily., Disp: , Rfl:   •  XIFAXAN 550 MG tablet, TAKE ONE TABLET BY MOUTH TWICE A DAY, Disp: 60 tablet, Rfl: 2  •  ZINC GLUCONATE PO, Take 1 tablet by mouth Daily., Disp: , Rfl:         Review of Systems   Constitutional: Positive for fatigue. Negative for appetite change and fever.   Eyes: Negative for visual disturbance.   Respiratory: Negative for shortness of breath.    Cardiovascular: Positive for leg swelling. Negative for palpitations.   Gastrointestinal: Negative for abdominal pain and vomiting.   Endocrine: Positive for polydipsia. Negative for polyuria.   Musculoskeletal: Negative for joint swelling and neck pain.   Skin: Negative for rash.   Neurological: Positive for numbness. Negative for weakness.   Psychiatric/Behavioral: Negative for behavioral problems.       Objective       Vitals:    07/01/19 1135   BP: 124/72   Pulse: 105   SpO2: 99%   Weight: 84.8 kg (187 lb)   Height: 168.9 cm (66.5\")     Body mass index is 29.73 kg/m².      Physical Exam   Constitutional: She is oriented to person, place, and time. She appears well-nourished.   Obese     HENT:   Head: Normocephalic and atraumatic.   Wide neck   Eyes: Conjunctivae and EOM are normal. No " scleral icterus.   Neck: Normal range of motion. Neck supple. No thyromegaly present.   Acanthosis nigricans   Cardiovascular: Normal rate and normal heart sounds.   Pulmonary/Chest: Effort normal and breath sounds normal. No stridor. She has no wheezes.   Abdominal: Soft. Bowel sounds are normal. She exhibits no distension. There is no tenderness.   Central obesity   Musculoskeletal: She exhibits no edema or tenderness.   Neurological: She is alert and oriented to person, place, and time.   Skin: Skin is warm and dry. She is not diaphoretic.   Psychiatric: She has a normal mood and affect.   Vitals reviewed.    Results Review:     I reviewed the patient's new clinical results and mentioned them above in HPI and in plan as well.    Medical records reviewed  Summary:done      Admission on 06/28/2019, Discharged on 06/28/2019   Component Date Value Ref Range Status   • Glucose 06/28/2019 163* 70 - 130 mg/dL Final     Lab Results   Component Value Date    HGBA1C 8.50 (H) 05/06/2019    HGBA1C 11.1 (H) 02/14/2019    HGBA1C 10.10 (H) 01/29/2019     Lab Results   Component Value Date    MICROALBUR <1.2 12/15/2018    CREATININE 0.79 05/14/2019     Imaging Results (most recent)     None                Assessment and Plan:    Silvia was seen today for diabetes.    Diagnoses and all orders for this visit:    Type 2 diabetes mellitus, uncontrolled, with neuropathy (CMS/[facility])  -     TSH  -     T4, Free  -     Hemoglobin A1c  -     TSH; Future  -     T4, Free; Future  -     Comprehensive Metabolic Panel; Future  -     Hemoglobin A1c; Future  -     Lipid Panel; Future  -     Microalbumin / Creatinine Urine Ratio - Urine, Clean Catch; Future    Gastroparesis  -     TSH  -     T4, Free  -     Hemoglobin A1c  -     TSH; Future  -     T4, Free; Future  -     Comprehensive Metabolic Panel; Future  -     Hemoglobin A1c; Future  -     Lipid Panel; Future  -     Microalbumin / Creatinine Urine Ratio - Urine, Clean Catch; Future    Diabetic  "peripheral neuropathy (CMS/[facility])  -     TSH  -     T4, Free  -     Hemoglobin A1c  -     TSH; Future  -     T4, Free; Future  -     Comprehensive Metabolic Panel; Future  -     Hemoglobin A1c; Future  -     Lipid Panel; Future  -     Microalbumin / Creatinine Urine Ratio - Urine, Clean Catch; Future      Type 2 diabetes mellitus-significantly uncontrolled  Discussed with the patient that I would be more aggressive with her insulin regimen with the benefit of a continuous glucose monitoring  At this point given her increased risk of hypoglycemia would defer from increasing her insulins.  Would consider making further changes based on her A1c.    Diabetic neuropathy  Continue Lyrica.      Reviewed Lab results with the patient.             Merary Burnett MD  07/01/19    EMR Dragon / transcription disclaimer:     \"Dictated utilizing Dragon dictation\".  "

## 2019-07-02 LAB
HBA1C MFR BLD: 10.3 % (ref 4.8–5.6)
T4 FREE SERPL-MCNC: 1.08 NG/DL (ref 0.93–1.7)
TSH SERPL DL<=0.005 MIU/L-ACNC: 1.88 MIU/ML (ref 0.27–4.2)

## 2019-07-03 ENCOUNTER — INFUSION (OUTPATIENT)
Dept: ONCOLOGY | Facility: HOSPITAL | Age: 59
End: 2019-07-03

## 2019-07-03 VITALS — SYSTOLIC BLOOD PRESSURE: 104 MMHG | DIASTOLIC BLOOD PRESSURE: 59 MMHG | HEART RATE: 89 BPM

## 2019-07-03 DIAGNOSIS — IMO0001 ADVERSE EFFECT OF IRON OR ITS COMPOUND, SUBSEQUENT ENCOUNTER: ICD-10-CM

## 2019-07-03 DIAGNOSIS — D50.9 IRON DEFICIENCY ANEMIA, UNSPECIFIED IRON DEFICIENCY ANEMIA TYPE: Primary | ICD-10-CM

## 2019-07-03 PROCEDURE — 96374 THER/PROPH/DIAG INJ IV PUSH: CPT | Performed by: INTERNAL MEDICINE

## 2019-07-03 PROCEDURE — 25010000002 FERRIC CARBOXYMALTOSE 750 MG/15ML SOLUTION 15 ML VIAL: Performed by: INTERNAL MEDICINE

## 2019-07-03 PROCEDURE — 63710000001 PROCHLORPERAZINE MALEATE PER 5 MG: Performed by: INTERNAL MEDICINE

## 2019-07-03 RX ORDER — SODIUM CHLORIDE 9 MG/ML
250 INJECTION, SOLUTION INTRAVENOUS ONCE
Status: COMPLETED | OUTPATIENT
Start: 2019-07-03 | End: 2019-07-03

## 2019-07-03 RX ORDER — PROCHLORPERAZINE MALEATE 5 MG/1
10 TABLET ORAL ONCE
Status: COMPLETED | OUTPATIENT
Start: 2019-07-03 | End: 2019-07-03

## 2019-07-03 RX ADMIN — SODIUM CHLORIDE 250 ML: 9 INJECTION, SOLUTION INTRAVENOUS at 14:16

## 2019-07-03 RX ADMIN — PROCHLORPERAZINE MALEATE 10 MG: 5 TABLET ORAL at 14:17

## 2019-07-03 RX ADMIN — FERRIC CARBOXYMALTOSE INJECTION 750 MG: 50 INJECTION, SOLUTION INTRAVENOUS at 14:22

## 2019-07-12 ENCOUNTER — RESULTS ENCOUNTER (OUTPATIENT)
Dept: ONCOLOGY | Facility: CLINIC | Age: 59
End: 2019-07-12

## 2019-07-12 DIAGNOSIS — D61.818 PANCYTOPENIA (HCC): ICD-10-CM

## 2019-07-12 DIAGNOSIS — D69.3 ACUTE ITP (HCC): ICD-10-CM

## 2019-07-12 DIAGNOSIS — K74.69 OTHER CIRRHOSIS OF LIVER (HCC): ICD-10-CM

## 2019-07-12 DIAGNOSIS — D73.1 HYPERSPLENISM: ICD-10-CM

## 2019-07-12 DIAGNOSIS — I85.11 SECONDARY ESOPHAGEAL VARICES WITH BLEEDING (HCC): ICD-10-CM

## 2019-07-12 DIAGNOSIS — K75.81 NASH (NONALCOHOLIC STEATOHEPATITIS): ICD-10-CM

## 2019-07-12 DIAGNOSIS — D50.9 IRON DEFICIENCY ANEMIA, UNSPECIFIED IRON DEFICIENCY ANEMIA TYPE: ICD-10-CM

## 2019-07-28 DIAGNOSIS — G47.00 INSOMNIA, UNSPECIFIED TYPE: ICD-10-CM

## 2019-07-28 DIAGNOSIS — Z86.69 HISTORY OF MIGRAINE: ICD-10-CM

## 2019-07-29 RX ORDER — FERROUS SULFATE 325(65) MG
TABLET ORAL
Qty: 60 TABLET | Refills: 2 | Status: SHIPPED | OUTPATIENT
Start: 2019-07-29

## 2019-07-30 RX ORDER — AMITRIPTYLINE HYDROCHLORIDE 50 MG/1
TABLET, FILM COATED ORAL
Qty: 30 TABLET | Refills: 1 | Status: SHIPPED | OUTPATIENT
Start: 2019-07-30 | End: 2020-12-10

## 2019-07-30 RX ORDER — TRAZODONE HYDROCHLORIDE 100 MG/1
TABLET ORAL
Qty: 30 TABLET | Refills: 1 | Status: SHIPPED | OUTPATIENT
Start: 2019-07-30 | End: 2020-12-10

## 2019-08-06 NOTE — PLAN OF CARE
Problem: Patient Care Overview  Goal: Plan of Care Review  Outcome: Ongoing (interventions implemented as appropriate)   12/14/18 0700   Coping/Psychosocial   Plan of Care Reviewed With patient   OTHER   Outcome Summary Patient admiited last night for hyperglycemia BS on arrival to the unit was 381 treated with 20units,patient has multiple illnesses and is very depressed and stated that she has thought about suicide,access center called to see patient,stated that they would see her this morning,allowed patient to vent feelings,will continue to monitor closely.       Problem: Fall Risk (Adult)  Goal: Absence of Fall  Outcome: Ongoing (interventions implemented as appropriate)      Problem: Suicide Risk (Adult)  Goal: Identify Related Risk Factors and Signs and Symptoms  Outcome: Ongoing (interventions implemented as appropriate)      Problem: Skin Injury Risk (Adult)  Goal: Skin Health and Integrity  Outcome: Ongoing (interventions implemented as appropriate)      Problem: Hyperglycemia, Persistent (Adult)  Goal: Signs and Symptoms of Listed Potential Problems Will be Absent, Minimized or Managed (Hyperglycemia, Persistent)  Outcome: Ongoing (interventions implemented as appropriate)         no

## 2019-08-09 ENCOUNTER — RESULTS ENCOUNTER (OUTPATIENT)
Dept: ONCOLOGY | Facility: CLINIC | Age: 59
End: 2019-08-09

## 2019-08-09 DIAGNOSIS — D69.3 ACUTE ITP (HCC): ICD-10-CM

## 2019-08-09 DIAGNOSIS — K75.81 NASH (NONALCOHOLIC STEATOHEPATITIS): ICD-10-CM

## 2019-08-09 DIAGNOSIS — I85.11 SECONDARY ESOPHAGEAL VARICES WITH BLEEDING (HCC): ICD-10-CM

## 2019-08-09 DIAGNOSIS — D73.1 HYPERSPLENISM: ICD-10-CM

## 2019-08-09 DIAGNOSIS — D50.9 IRON DEFICIENCY ANEMIA, UNSPECIFIED IRON DEFICIENCY ANEMIA TYPE: ICD-10-CM

## 2019-08-09 DIAGNOSIS — K74.69 OTHER CIRRHOSIS OF LIVER (HCC): ICD-10-CM

## 2019-08-09 DIAGNOSIS — D61.818 PANCYTOPENIA (HCC): ICD-10-CM

## 2019-09-06 ENCOUNTER — RESULTS ENCOUNTER (OUTPATIENT)
Dept: ONCOLOGY | Facility: CLINIC | Age: 59
End: 2019-09-06

## 2019-09-06 DIAGNOSIS — D50.9 IRON DEFICIENCY ANEMIA, UNSPECIFIED IRON DEFICIENCY ANEMIA TYPE: ICD-10-CM

## 2019-09-06 DIAGNOSIS — D73.1 HYPERSPLENISM: ICD-10-CM

## 2019-09-06 DIAGNOSIS — K75.81 NASH (NONALCOHOLIC STEATOHEPATITIS): ICD-10-CM

## 2019-09-06 DIAGNOSIS — D69.3 ACUTE ITP (HCC): ICD-10-CM

## 2019-09-06 DIAGNOSIS — D61.818 PANCYTOPENIA (HCC): ICD-10-CM

## 2019-09-06 DIAGNOSIS — I85.11 SECONDARY ESOPHAGEAL VARICES WITH BLEEDING (HCC): ICD-10-CM

## 2019-09-06 DIAGNOSIS — K74.69 OTHER CIRRHOSIS OF LIVER (HCC): ICD-10-CM

## 2019-09-25 ENCOUNTER — TELEPHONE (OUTPATIENT)
Dept: GASTROENTEROLOGY | Facility: CLINIC | Age: 59
End: 2019-09-25

## 2019-09-25 NOTE — TELEPHONE ENCOUNTER
Left message for patient to call [phone].trying to find out if she wants to schedule EGD before the end of the year

## 2019-09-29 ENCOUNTER — RESULTS ENCOUNTER (OUTPATIENT)
Dept: ENDOCRINOLOGY | Age: 59
End: 2019-09-29

## 2019-09-29 DIAGNOSIS — IMO0002 TYPE 2 DIABETES MELLITUS, UNCONTROLLED, WITH NEUROPATHY: ICD-10-CM

## 2019-09-29 DIAGNOSIS — K31.84 GASTROPARESIS: ICD-10-CM

## 2019-09-29 DIAGNOSIS — E11.42 DIABETIC PERIPHERAL NEUROPATHY (HCC): ICD-10-CM

## 2019-10-01 ENCOUNTER — TELEPHONE (OUTPATIENT)
Dept: FAMILY MEDICINE CLINIC | Facility: CLINIC | Age: 59
End: 2019-10-01

## 2019-10-01 ENCOUNTER — TELEPHONE (OUTPATIENT)
Dept: ENDOCRINOLOGY | Age: 59
End: 2019-10-01

## 2019-10-01 NOTE — TELEPHONE ENCOUNTER
Patient cancelled her appointment with Dr. Burnett 10-4-19, 2:00pm and said she has been in [place] since July due to father had cancer and was in hospice and he passed way on 9-11-19.     She said she will be between [place] and [place] and will also see her family practice doctor in [place] and she will be referred to an endo in [place].  She asked about requesting medical records and I  gave her [facility] phone & fax #.   She said she needs to wait to reschedule with Dr. Burnett.   Pt - [phone]

## 2019-10-01 NOTE — TELEPHONE ENCOUNTER
Patient missed recent office visit due to being in [place] with dying father.  Since he has passed away she will be remaining in [place] while she is dealing with his estate.  She will follow up when she returns.

## 2019-10-02 ENCOUNTER — TELEPHONE (OUTPATIENT)
Dept: GASTROENTEROLOGY | Facility: CLINIC | Age: 59
End: 2019-10-02

## 2019-10-02 NOTE — TELEPHONE ENCOUNTER
Pt states she is currently in [place] and will be there indefinitely.  She recently lost her father and she will need to help raise her grandchildren in [place].  She wants to keep Dr Coleman as her GI provider, but she will also establish care with a provider in [place].

## 2019-10-04 ENCOUNTER — RESULTS ENCOUNTER (OUTPATIENT)
Dept: ONCOLOGY | Facility: CLINIC | Age: 59
End: 2019-10-04

## 2019-10-04 DIAGNOSIS — D73.1 HYPERSPLENISM: ICD-10-CM

## 2019-10-04 DIAGNOSIS — I85.11 SECONDARY ESOPHAGEAL VARICES WITH BLEEDING (HCC): ICD-10-CM

## 2019-10-04 DIAGNOSIS — D50.9 IRON DEFICIENCY ANEMIA, UNSPECIFIED IRON DEFICIENCY ANEMIA TYPE: ICD-10-CM

## 2019-10-04 DIAGNOSIS — K74.69 OTHER CIRRHOSIS OF LIVER (HCC): ICD-10-CM

## 2019-10-04 DIAGNOSIS — D69.3 ACUTE ITP (HCC): ICD-10-CM

## 2019-10-04 DIAGNOSIS — K75.81 NASH (NONALCOHOLIC STEATOHEPATITIS): ICD-10-CM

## 2019-10-04 DIAGNOSIS — D61.818 PANCYTOPENIA (HCC): ICD-10-CM

## 2020-01-21 ENCOUNTER — TELEPHONE (OUTPATIENT)
Dept: TRANSPLANT | Facility: CLINIC | Age: 60
End: 2020-01-21

## 2020-01-21 NOTE — TELEPHONE ENCOUNTER
----- Message from Lenin Valente sent at 2020  2:35 PM CST -----    Returned call to Rd at [phone], but there was no answer. LVM stating that we have not rec'renée any recs on this pt and to re-fax pt info to [phone]      ----------------------------------------------------------------------------------------      Message   Received: Today   Message Contents   Lita Kapadia sent to [facility] Referral Pool  Caller: Rd cho/ Noelle in [place], [place] (Today, 12:43 PM)         Want to verify if paperwork was received for Anyi Conrad/ LINA: 64     Contact: [phone]/ opt.3 or [phone] (Rd direct line)     If no answer pls leave a message

## 2020-02-05 ENCOUNTER — TELEPHONE (OUTPATIENT)
Dept: TRANSPLANT | Facility: CLINIC | Age: 60
End: 2020-02-05

## 2020-02-06 ENCOUNTER — TELEPHONE (OUTPATIENT)
Dept: TRANSPLANT | Facility: CLINIC | Age: 60
End: 2020-02-06

## 2020-02-06 DIAGNOSIS — K74.60 LIVER CIRRHOSIS SECONDARY TO NASH: ICD-10-CM

## 2020-02-06 DIAGNOSIS — K75.81 LIVER CIRRHOSIS SECONDARY TO NASH: ICD-10-CM

## 2020-02-06 NOTE — TELEPHONE ENCOUNTER
Referral received from Dr Kevin Mehta     Patient with ROCHA Cirrhosis with multiple recent  GI bleeds and tips. MELD 9  ICD-10:  ICD-10-CM: K75.81, K74.60  Referred for liver transplant for  EVALUATION.    Referral completed and forwarded to [facility].          Insurance:   [place] [employer] [place]  [place]:[id number]  GRP# [id number]  Contact #     SECONDARY: Medicare  ID:[id number]  Contact #

## 2020-02-18 ENCOUNTER — TELEPHONE (OUTPATIENT)
Dept: TRANSPLANT | Facility: CLINIC | Age: 60
End: 2020-02-18

## 2020-02-18 NOTE — TELEPHONE ENCOUNTER
----- Message from Lenin Valente sent at 2/18/2020  4:35 PM CST -----    Called and sp to to pt to [facility] an appt; pt [facility]'ed for 2/20

## 2020-02-19 ENCOUNTER — TELEPHONE (OUTPATIENT)
Dept: TRANSPLANT | Facility: CLINIC | Age: 60
End: 2020-02-19

## 2020-02-20 ENCOUNTER — OFFICE VISIT (OUTPATIENT)
Dept: TRANSPLANT | Facility: CLINIC | Age: 60
End: 2020-02-20
Payer: MEDICARE

## 2020-02-20 VITALS
RESPIRATION RATE: 16 BRPM | OXYGEN SATURATION: 100 % | SYSTOLIC BLOOD PRESSURE: 136 MMHG | WEIGHT: 165.81 LBS | HEART RATE: 103 BPM | DIASTOLIC BLOOD PRESSURE: 68 MMHG | TEMPERATURE: 98 F

## 2020-02-20 DIAGNOSIS — K75.81 LIVER CIRRHOSIS SECONDARY TO NASH: ICD-10-CM

## 2020-02-20 DIAGNOSIS — Z76.82 AWAITING ORGAN TRANSPLANT STATUS: ICD-10-CM

## 2020-02-20 DIAGNOSIS — Z95.828 S/P TIPS (TRANSJUGULAR INTRAHEPATIC PORTOSYSTEMIC SHUNT): Primary | ICD-10-CM

## 2020-02-20 DIAGNOSIS — K75.81 LIVER CIRRHOSIS SECONDARY TO NASH: Primary | ICD-10-CM

## 2020-02-20 DIAGNOSIS — K74.60 LIVER CIRRHOSIS SECONDARY TO NASH: ICD-10-CM

## 2020-02-20 DIAGNOSIS — K74.60 LIVER CIRRHOSIS SECONDARY TO NASH: Primary | ICD-10-CM

## 2020-02-20 LAB
AMPHET+METHAMPHET UR QL: NEGATIVE
BACTERIA #/AREA URNS AUTO: ABNORMAL /HPF
BARBITURATES UR QL SCN>200 NG/ML: NEGATIVE
BENZODIAZ UR QL SCN>200 NG/ML: NORMAL
BILIRUB UR QL STRIP: NEGATIVE
BZE UR QL SCN: NEGATIVE
CANNABINOIDS UR QL SCN: NEGATIVE
CLARITY UR REFRACT.AUTO: ABNORMAL
COLOR UR AUTO: ABNORMAL
CREAT UR-MCNC: 151 MG/DL (ref 15–325)
ETHANOL UR-MCNC: <10 MG/DL
GLUCOSE UR QL STRIP: ABNORMAL
HGB UR QL STRIP: ABNORMAL
KETONES UR QL STRIP: NEGATIVE
LEUKOCYTE ESTERASE UR QL STRIP: NEGATIVE
METHADONE UR QL SCN>300 NG/ML: NEGATIVE
MICROSCOPIC COMMENT: ABNORMAL
NITRITE UR QL STRIP: NEGATIVE
OPIATES UR QL SCN: NEGATIVE
PCP UR QL SCN>25 NG/ML: NEGATIVE
PH UR STRIP: 5 [PH] (ref 5–8)
PROT UR QL STRIP: NEGATIVE
RBC #/AREA URNS AUTO: 27 /HPF (ref 0–4)
SP GR UR STRIP: 1.02 (ref 1–1.03)
SQUAMOUS #/AREA URNS AUTO: 2 /HPF
TOXICOLOGY INFORMATION: NORMAL
URN SPEC COLLECT METH UR: ABNORMAL
WBC #/AREA URNS AUTO: 2 /HPF (ref 0–5)
YEAST UR QL AUTO: ABNORMAL

## 2020-02-20 PROCEDURE — 99205 PR OFFICE/OUTPT VISIT, NEW, LEVL V, 60-74 MIN: ICD-10-PCS | Mod: S$PBB,TXP,, | Performed by: INTERNAL MEDICINE

## 2020-02-20 PROCEDURE — 99213 OFFICE O/P EST LOW 20 MIN: CPT | Mod: PBBFAC,TXP,25 | Performed by: INTERNAL MEDICINE

## 2020-02-20 PROCEDURE — 81001 URINALYSIS AUTO W/SCOPE: CPT | Mod: TXP

## 2020-02-20 PROCEDURE — 99999 PR PBB SHADOW E&M-EST. PATIENT-LVL III: ICD-10-PCS | Mod: PBBFAC,TXP,, | Performed by: INTERNAL MEDICINE

## 2020-02-20 PROCEDURE — 99205 OFFICE O/P NEW HI 60 MIN: CPT | Mod: S$PBB,TXP,, | Performed by: INTERNAL MEDICINE

## 2020-02-20 PROCEDURE — 80307 DRUG TEST PRSMV CHEM ANLYZR: CPT | Mod: TXP

## 2020-02-20 PROCEDURE — 99999 PR PBB SHADOW E&M-EST. PATIENT-LVL III: CPT | Mod: PBBFAC,TXP,, | Performed by: INTERNAL MEDICINE

## 2020-02-20 RX ORDER — FUROSEMIDE 40 MG/1
40 TABLET ORAL DAILY
COMMUNITY
End: 2020-05-20

## 2020-02-20 RX ORDER — LANOLIN ALCOHOL/MO/W.PET/CERES
400 CREAM (GRAM) TOPICAL DAILY
COMMUNITY

## 2020-02-20 RX ORDER — NADOLOL 20 MG/1
20 TABLET ORAL DAILY
COMMUNITY

## 2020-02-20 RX ORDER — METAXALONE 800 MG/1
800 TABLET ORAL 3 TIMES DAILY
COMMUNITY

## 2020-02-20 RX ORDER — ALPRAZOLAM 0.5 MG/1
0.5 TABLET ORAL NIGHTLY PRN
COMMUNITY

## 2020-02-20 RX ORDER — PANTOPRAZOLE SODIUM 40 MG/1
40 TABLET, DELAYED RELEASE ORAL DAILY
COMMUNITY

## 2020-02-20 RX ORDER — TRAZODONE HYDROCHLORIDE 100 MG/1
100 TABLET ORAL NIGHTLY
COMMUNITY
End: 2020-05-20

## 2020-02-20 RX ORDER — SPIRONOLACTONE 100 MG/1
100 TABLET, FILM COATED ORAL DAILY
COMMUNITY
End: 2020-05-20

## 2020-02-20 NOTE — PROGRESS NOTES
Subjective:       Patient ID: Anyi Conrad is a 59 y.o. female.    Chief Complaint: No chief complaint on file.    HPI  I saw this 59 y.o. lady who came with her sister, brother in law and grandson. She just moved from [place] to [place].  She was diagnosed with ROCHA cirrhosis in 2015 when she presented with HE and a fall leading to serious facial injury.    - multiple bleeding episodes since then leading to multiple hospital admissions (6 admissions in 2019 and 2 admissions in ).  - ascites/edema 2 years ago but this became problematic in Dec 2019.    TIPS done in 2020 at [facility] [place] [facility]    She describes a paradoxical improvement in her HE AFTER TIPS.    Her ascites and edema have gone and her diuretic dosease have now been reduced.      MELD-Na score: 10 at 2020  8:45 AM  MELD score: 10 at 2020  8:45 AM  Calculated from:  Serum Creatinine: 0.8 mg/dL (Rounded to 1 mg/dL) at 2020  8:45 AM  Serum Sodium: 137 mmol/L at 2020  8:45 AM  Total Bilirubin: 2.0 mg/dL at 2020  8:45 AM  INR(ratio): 1.1 at 2020  8:45 AM  Age: 59 years    There is no height or weight on file to calculate BMI.     PMH:  Poorly controlled DM  SLE  Epilepsy- petit mal (was this related to HE??)    No heart disease    Partial hystererctomy   Bladder repair-   Lap cholecystectomy-   Right knee reconstruction  TIPS- [facility] [place] [facility]      FH:  Mother  of ROCHA age 75    SH:  Lives in [place]  smiked for 4 years - 15 years ago  No alcohol      Review of Systems   Constitutional: Positive for activity change and fatigue. Negative for appetite change, chills, fever and unexpected weight change.   HENT: Negative for ear pain, hearing loss, nosebleeds, sore throat and trouble swallowing.    Eyes: Negative for redness and visual disturbance.   Respiratory: Negative for cough, chest tightness, shortness of breath and wheezing.    Cardiovascular: Negative for chest pain and palpitations.    Gastrointestinal: Positive for nausea. Negative for abdominal distention, abdominal pain, blood in stool, constipation, diarrhea and vomiting.   Genitourinary: Negative for difficulty urinating, dysuria, frequency, hematuria and urgency.   Musculoskeletal: Negative for arthralgias, back pain, gait problem, joint swelling and myalgias.   Skin: Negative for rash.   Neurological: Negative for tremors, seizures, speech difficulty, weakness and headaches.   Hematological: Negative for adenopathy.   Psychiatric/Behavioral: Negative for confusion, decreased concentration and sleep disturbance. The patient is not nervous/anxious.          Lab Results   Component Value Date    ALT 55 (H) 02/20/2020    AST 59 (H) 02/20/2020     (H) 02/20/2020    ALKPHOS 211 (H) 02/20/2020    BILITOT 2.0 (H) 02/20/2020     Past Medical History:   Diagnosis Date    Allergy     Cirrhosis     Systemic lupus erythematosus      Past Surgical History:   Procedure Laterality Date    gallblader      HYSTERECTOMY      KNEE SURGERY Right      Current Outpatient Medications   Medication Sig    ALPRAZolam (XANAX) 0.5 MG tablet Take 0.5 mg by mouth 3 (three) times daily.    magnesium oxide (MAG-OX) 400 mg (241.3 mg magnesium) tablet Take 400 mg by mouth once daily.    metaxalone (SKELAXIN) 800 MG tablet Take 800 mg by mouth 3 (three) times daily.    nadolol (CORGARD) 20 MG tablet Take 20 mg by mouth once daily.    oxyCODONE (OXAYDO) 5 mg TbOr Take by mouth.    pantoprazole (PROTONIX) 40 MG tablet Take 40 mg by mouth once daily.    rifAXImin (XIFAXAN) 200 mg Tab Take 550 mg by mouth 3 (three) times daily.    spironolactone (ALDACTONE) 100 MG tablet Take 100 mg by mouth once daily.    traZODone (DESYREL) 100 MG tablet Take 100 mg by mouth every evening.     No current facility-administered medications for this visit.        Objective:      Physical Exam   Constitutional: She appears well-developed and well-nourished. No distress.    Palmar erythema   HENT:   Head: Normocephalic.   Eyes: Pupils are equal, round, and reactive to light. EOM are normal.   Neck: No JVD present. No tracheal deviation present. No thyromegaly present.   Cardiovascular: Normal rate, regular rhythm and normal heart sounds.   No murmur heard.  Pulmonary/Chest: Effort normal and breath sounds normal. No stridor. She has no wheezes. She has no rales.   Abdominal: Soft. She exhibits no distension and no mass. There is no tenderness. There is no guarding.   Musculoskeletal: She exhibits no edema.   Lymphadenopathy:        Head (right side): No submental, no submandibular, no tonsillar, no preauricular, no posterior auricular and no occipital adenopathy present.        Head (left side): No submental, no submandibular, no tonsillar, no preauricular, no posterior auricular and no occipital adenopathy present.     She has no cervical adenopathy.     She has no axillary adenopathy.   Neurological: She is alert. She has normal strength. She is not disoriented. No cranial nerve deficit or sensory deficit.   Skin: Skin is warm and intact. No cyanosis.   Psychiatric: She has a normal mood and affect. Her behavior is normal.       Assessment:       1. 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 Awaiting organ transplant status        Plan:   Decompensated ROCHA cirrhosis with low MELD score  - main issues were ascites, HE and variceal bleeding but all of these have significantly improved since her TIPS in Jan 2020.    Discussed LT  I think she is too early for OLT.    - Vaccinated for HAV and HBV  - aware not to eat raw oysters    Urged to continue local follow up- set up to see local GI soon  I will see her again in 3 months with labs incl AFP and US with doppler to check for TIPS patency.    - continue with Lactulose and rifaximin.      UNOS Patient Status  Functional Status: 80% - Normal activity with effort: some symptoms of disease  Physical Capacity: No Limitations

## 2020-02-20 NOTE — Clinical Note
3 months with labs/AFP and US with doppler- if stable, we can send her to hep after that appointment.

## 2020-02-20 NOTE — LETTER
February 20, 2020        Earl Mehta  [address] [place] [address] [address] [address]  Phone: [phone]  Fax: [phone]             [facility]  [address]  [facility] [address]  Phone: [phone]   Patient: Anyi Conrad   MR Number: [id number]   YOB: 1960   Date of Visit: 2/20/2020       Dear Dr. Earl Mehta    Thank you for referring Anyi Conrad to me for evaluation. Attached you will find relevant portions of my assessment and plan of care.    If you have questions, please do not hesitate to call me. I look forward to following Anyi Conrad along with you.    Sincerely,    Rehan Phillips MD    Enclosure    If you would like to receive this communication electronically, please contact [email or web address] or [phone] to request [employer] access.    [employer] is a tool which provides read-only access to select patient information with whom you have a relationship. Its easy to use and provides real time access to review your patients record including encounter summaries, notes, results, and demographic information.    If you feel you have received this communication in error or would no longer like to receive these types of communications, please e-mail [email or web address]

## 2020-05-15 ENCOUNTER — TELEPHONE (OUTPATIENT)
Dept: TRANSPLANT | Facility: CLINIC | Age: 60
End: 2020-05-15

## 2020-05-15 NOTE — TELEPHONE ENCOUNTER
----- Message from Lenin Valente sent at 5/15/2020  9:34 AM CDT -----    Called pt to rosita lab appt, but there was no answer. LVM for her to call back; will she do them locally or here when she comes to clinic.

## 2020-05-18 ENCOUNTER — TELEPHONE (OUTPATIENT)
Dept: TRANSPLANT | Facility: CLINIC | Age: 60
End: 2020-05-18

## 2020-05-18 NOTE — TELEPHONE ENCOUNTER
----- Message from Lenin Valente sent at 5/18/2020  8:00 AM CDT -----    Called pt to re/rosita appt on 5/20; Md will not be in clinic. Pt said that she will see if she can cx her hotel rsvp and will call back.

## 2020-05-18 NOTE — TELEPHONE ENCOUNTER
----- Message from Lenin Valente sent at 5/18/2020  9:26 AM CDT -----    Called and sp to pt to re/rosita her MD appt; pt has been re/rosita to see Dr. Bermeo at 11am on 5/20

## 2020-05-19 ENCOUNTER — TELEPHONE (OUTPATIENT)
Dept: TRANSPLANT | Facility: CLINIC | Age: 60
End: 2020-05-19

## 2020-05-19 NOTE — TELEPHONE ENCOUNTER
----- Message from Inez Karimi sent at 5/19/2020  3:43 PM CDT -----  Contact: PT  PT called to get transportation set up for tomorrow's visit within the hospital since her family can't come    Callback: [phone]  --------------------------------------------  Per CANDE Valente, call returned.  Left message on  informing her that since she requires assistance to each appt on tomorrow, and sister is with her, ok to have sister to transport her to appts.  Callback/ [facility] message requested for any questions

## 2020-05-20 ENCOUNTER — HOSPITAL ENCOUNTER (OUTPATIENT)
Dept: RADIOLOGY | Facility: HOSPITAL | Age: 60
Discharge: HOME | End: 2020-05-20
Attending: INTERNAL MEDICINE
Payer: COMMERCIAL

## 2020-05-20 ENCOUNTER — TELEPHONE (OUTPATIENT)
Dept: HEPATOLOGY | Facility: CLINIC | Age: 60
End: 2020-05-20

## 2020-05-20 ENCOUNTER — OFFICE VISIT (OUTPATIENT)
Dept: TRANSPLANT | Facility: CLINIC | Age: 60
End: 2020-05-20
Payer: COMMERCIAL

## 2020-05-20 VITALS
HEIGHT: 67 IN | RESPIRATION RATE: 18 BRPM | WEIGHT: 171.5 LBS | TEMPERATURE: 98 F | BODY MASS INDEX: 26.92 KG/M2 | OXYGEN SATURATION: 98 % | HEART RATE: 95 BPM | SYSTOLIC BLOOD PRESSURE: 136 MMHG | DIASTOLIC BLOOD PRESSURE: 71 MMHG

## 2020-05-20 DIAGNOSIS — K74.60 LIVER CIRRHOSIS SECONDARY TO NASH: ICD-10-CM

## 2020-05-20 DIAGNOSIS — D73.1 THROMBOCYTOPENIA DUE TO HYPERSPLENISM: ICD-10-CM

## 2020-05-20 DIAGNOSIS — K76.6 PORTAL HYPERTENSION: ICD-10-CM

## 2020-05-20 DIAGNOSIS — K75.81 LIVER CIRRHOSIS SECONDARY TO NASH: ICD-10-CM

## 2020-05-20 DIAGNOSIS — K75.81 LIVER CIRRHOSIS SECONDARY TO NASH: Primary | ICD-10-CM

## 2020-05-20 DIAGNOSIS — E44.0 PROTEIN-CALORIE MALNUTRITION, MODERATE: ICD-10-CM

## 2020-05-20 DIAGNOSIS — K76.82 HEPATIC ENCEPHALOPATHY: ICD-10-CM

## 2020-05-20 DIAGNOSIS — Z95.828 S/P TIPS (TRANSJUGULAR INTRAHEPATIC PORTOSYSTEMIC SHUNT): ICD-10-CM

## 2020-05-20 DIAGNOSIS — L29.8 CHOLESTATIC PRURITUS: ICD-10-CM

## 2020-05-20 DIAGNOSIS — D69.59 THROMBOCYTOPENIA DUE TO HYPERSPLENISM: ICD-10-CM

## 2020-05-20 DIAGNOSIS — K76.0 FATTY LIVER DISEASE, NONALCOHOLIC: ICD-10-CM

## 2020-05-20 DIAGNOSIS — K74.60 LIVER CIRRHOSIS SECONDARY TO NASH: Primary | ICD-10-CM

## 2020-05-20 DIAGNOSIS — Z76.82 AWAITING ORGAN TRANSPLANT STATUS: ICD-10-CM

## 2020-05-20 PROCEDURE — 99999 PR PBB SHADOW E&M-EST. PATIENT-LVL IV: CPT | Mod: PBBFAC,TXP,, | Performed by: INTERNAL MEDICINE

## 2020-05-20 PROCEDURE — 99214 PR OFFICE/OUTPT VISIT, EST, LEVL IV, 30-39 MIN: ICD-10-PCS | Mod: S$PBB,,, | Performed by: INTERNAL MEDICINE

## 2020-05-20 PROCEDURE — 93975 US ABDOMEN COMP WITH DOPPLER (XPD): ICD-10-PCS | Mod: 26,,, | Performed by: RADIOLOGY

## 2020-05-20 PROCEDURE — 93975 VASCULAR STUDY: CPT | Mod: TC,NTX

## 2020-05-20 PROCEDURE — 93975 VASCULAR STUDY: CPT | Mod: 26,,, | Performed by: RADIOLOGY

## 2020-05-20 PROCEDURE — 99999 PR PBB SHADOW E&M-EST. PATIENT-LVL IV: ICD-10-PCS | Mod: PBBFAC,TXP,, | Performed by: INTERNAL MEDICINE

## 2020-05-20 PROCEDURE — 99214 OFFICE O/P EST MOD 30 MIN: CPT | Mod: S$PBB,,, | Performed by: INTERNAL MEDICINE

## 2020-05-20 PROCEDURE — 99214 OFFICE O/P EST MOD 30 MIN: CPT | Mod: PBBFAC,25,NTX | Performed by: INTERNAL MEDICINE

## 2020-05-20 RX ORDER — DESVENLAFAXINE SUCCINATE 50 MG/1
50 TABLET, EXTENDED RELEASE ORAL DAILY
COMMUNITY

## 2020-05-20 RX ORDER — PROMETHAZINE HYDROCHLORIDE 25 MG/1
25 TABLET ORAL EVERY 4 HOURS
COMMUNITY

## 2020-05-20 RX ORDER — HYDROXYZINE HYDROCHLORIDE 25 MG/1
50 TABLET, FILM COATED ORAL 3 TIMES DAILY PRN
COMMUNITY

## 2020-05-20 RX ORDER — DOXEPIN HYDROCHLORIDE 10 MG/1
10 CAPSULE ORAL NIGHTLY
COMMUNITY

## 2020-05-20 RX ORDER — ALUMINUM HYDROXIDE, MAGNESIUM HYDROXIDE, AND SIMETHICONE 2400; 240; 2400 MG/30ML; MG/30ML; MG/30ML
SUSPENSION ORAL EVERY 6 HOURS PRN
COMMUNITY

## 2020-05-20 RX ORDER — ESOMEPRAZOLE MAGNESIUM 40 MG/1
40 CAPSULE, DELAYED RELEASE ORAL
COMMUNITY

## 2020-05-20 RX ORDER — FERROUS SULFATE 325(65) MG
325 TABLET ORAL 2 TIMES DAILY
COMMUNITY

## 2020-05-20 RX ORDER — PREGABALIN 75 MG/1
75 CAPSULE ORAL 3 TIMES DAILY
COMMUNITY

## 2020-05-20 RX ORDER — DIPHENHYDRAMINE HCL 25 MG
25 CAPSULE ORAL EVERY 6 HOURS PRN
COMMUNITY
End: 2020-05-20

## 2020-05-20 RX ORDER — CHOLESTYRAMINE 4 G/9G
4 POWDER, FOR SUSPENSION ORAL
Qty: 270 PACKET | Refills: 3 | Status: SHIPPED | OUTPATIENT
Start: 2020-05-20 | End: 2021-05-20

## 2020-05-20 NOTE — TELEPHONE ENCOUNTER
Called patient inform her of her labs results below, she understood schedule her follow up visit to see Dr. Bermeo in hepatology side. She accepted 8/10

## 2020-05-20 NOTE — PATIENT INSTRUCTIONS
MELD score is 9. You do not need to undergo liver transplantation at this time.  Please start cholestyramine every 8 hours for itching.  Please see Dr. Herrera from Interventional Radiology in [employer] and consider TIPS revision.  Return in 3 months.      Cirrhosis Counseling  - strict abstinence of alcohol use  - compliance with rifaximin if you have developed hepatic encephalopathy (confusion due to high ammonia level)  - avoid non-steroidal anti-inflammatory drugs (NSAIDs) such as ibuprofen, Motrin, naprosyn, Alleve due to the risk of kidney damage  - can take acetaminophen (Tylenol), no more than 2000 mg per day  - low sodium (salt) 2 gram per day diet  - nutrition: 25-30kcal (calorie per body body weight in kilogram) per day  - no need to restrict protein in diet  - high protein diet: 1.2-1.5 gram/kg (protein per body weight in kilogram) per day to prevent muscle mass loss  - avoid fasting  - resistance exercises for muscle strength  - avoid raw seafoods due to the risk of fatal Vibrio vulnificus infection  - ultrasound or MRI of the liver every 6 months for liver cancer screening (you are at risk of developing liver cancer due to scar tissue in the liver)

## 2020-05-20 NOTE — Clinical Note
MELD remains low 9, no edema, stable. No indication for transplant now. Please close TXP episode. Schedule return in August in Hepatology.

## 2020-05-20 NOTE — PROGRESS NOTES
Hepatology Consult Note    Referring provider: No ref. provider found    Chief complaint:   Chief Complaint   Patient presents with    Cirrhosis       HPI:  Anyi Conrad is a pleasant 59 y.o. female who was referred to Hepatology Clinic for consultation of had concerns including Cirrhosis..        5/20/20: MELD: 9. Feels ok. US did not show ascites. No peripheral edema and stopped diuretics. C/o itching w/ little relief from anti-histamines. Takes rifaximin for grade 1 HE.   US - Findings consistent with liver cirrhosis. Sequela of portal hypertension including splenomegaly and splenic collaterals. Patent TIPS with mildly elevated velocity at the hepatic venous end possibly representing developing stenosis. Thrombosis of the left portal vein.        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       Past Medical History:   Diagnosis Date    Allergy     Cirrhosis     Systemic lupus erythematosus        Past Surgical History:   Procedure Laterality Date    gallblader      HYSTERECTOMY      KNEE SURGERY Right        Family History   Problem Relation Age of Onset    Cancer Mother     Cancer Father        Social History     Socioeconomic History    Marital status: Unknown     Spouse name: Not on file    Number of children: Not on file    Years of education: Not on file    Highest education level: Not on file   Occupational History    Not on file   Social Needs    Financial resource strain: Not on file    Food insecurity:     Worry: Not on file     Inability: Not on file    Transportation needs:     Medical: Not on file      Non-medical: Not on file   Tobacco Use    Smoking status: Smoker, Current Status Unknown   Substance and Sexual Activity    Alcohol use: Not Currently    Drug use: Never    Sexual activity: Not on file   Lifestyle    Physical activity:     Days per week: Not on file     Minutes per session: Not on file    Stress: Not on file   Relationships    Social connections:     Talks on phone: Not on file     Gets together: Not on file     Attends [employer] service: Not on file     Active member of club or organization: Not on file     Attends meetings of clubs or organizations: Not on file     Relationship status: Not on file   Other Topics Concern    Not on file   Social History Narrative    Not on file       Current Outpatient Medications   Medication Sig Dispense Refill    ALPRAZolam (XANAX) 0.5 MG tablet Take 0.5 mg by mouth nightly as needed.       aluminum & magnesium hydroxide-simethicone (MYLANTA MAX STRENGTH) 400-400-40 mg/5 mL suspension Take by mouth every 6 (six) hours as needed for Indigestion.      desvenlafaxine succinate (PRISTIQ) 50 MG Tb24 Take 50 mg by mouth once daily.      diphenhydrAMINE (BENADRYL) 25 mg capsule Take 25 mg by mouth every 6 (six) hours as needed for Itching.      docusate sodium (COLACE ORAL) Take by mouth once daily.      doxepin (SINEQUAN) 10 MG capsule Take 10 mg by mouth every evening.      esomeprazole (NEXIUM) 40 MG capsule Take 40 mg by mouth before breakfast.      ferrous sulfate (FEOSOL) 325 mg (65 mg iron) Tab tablet Take 325 mg by mouth 2 (two) times daily.      hydroxyzine HCL (ATARAX) 25 MG tablet Take 50 mg by mouth 3 (three) times daily as needed for Itching.      magnesium oxide (MAG-OX) 400 mg (241.3 mg magnesium) tablet Take 400 mg by mouth once daily.      magnesium salicylate (DOANS PILLS ORAL) Take 1 tablet by mouth every evening.      metaxalone (SKELAXIN) 800 MG tablet Take 800 mg by mouth 3 (three) times daily.      multivitamin/iron/folic  "acid (CENTRUM COMPLETE ORAL) Take by mouth.      oxyCODONE (OXAYDO) 5 mg TbOr Take by mouth 2 (two) times daily.       pregabalin (LYRICA) 75 MG capsule Take 75 mg by mouth 3 (three) times daily.      promethazine (PHENERGAN) 25 MG tablet Take 25 mg by mouth every 4 (four) hours.      rifAXImin (XIFAXAN) 200 mg Tab Take 550 mg by mouth 2 (two) times daily.       UNABLE TO FIND Nausene over the counter for nausea as needed.      furosemide (LASIX) 40 MG tablet Take 40 mg by mouth Daily.      nadolol (CORGARD) 20 MG tablet Take 20 mg by mouth once daily.      pantoprazole (PROTONIX) 40 MG tablet Take 40 mg by mouth once daily.      spironolactone (ALDACTONE) 100 MG tablet Take 100 mg by mouth once daily.      traZODone (DESYREL) 100 MG tablet Take 100 mg by mouth every evening.       No current facility-administered medications for this visit.        Review of patient's allergies indicates:   Allergen Reactions    Albuterol Anaphylaxis    Quinine     Tramadol Other (See Comments)       Review of Systems   Constitutional: Positive for malaise/fatigue.   Respiratory: Negative for shortness of breath.    Cardiovascular: Negative for chest pain and leg swelling.   Gastrointestinal: Negative for abdominal pain, blood in stool, constipation, diarrhea, heartburn, nausea and vomiting.   Skin: Positive for itching. Negative for rash.   Neurological: Positive for weakness.   Endo/Heme/Allergies: Bruises/bleeds easily.       Vitals:    05/20/20 1117   BP: 136/71   Pulse: 95   Resp: 18   Temp: 98.2 °F (36.8 °C)   TempSrc: Oral   SpO2: 98%   Weight: 77.8 kg (171 lb 8.3 oz)   Height: 5' 6.5" (1.689 m)       Physical Exam   Constitutional: She appears well-developed.   Chronically ill-appearing. Malnourished. Temporal wasting.     Eyes: No scleral icterus.   Cardiovascular: Normal rate and regular rhythm.   Pulmonary/Chest: Effort normal and breath sounds normal. No respiratory distress.   Abdominal: Soft. Bowel sounds " are normal. She exhibits no distension. There is no tenderness.   Musculoskeletal: She exhibits no edema.   Skin: No erythema.   Palmar erythema. Scattered spider angiomata on upper chest     Nursing note and vitals reviewed.      LABS: I personally reviewed pertinent laboratory findings.    Lab Results   Component Value Date    ALT 23 05/20/2020    AST 36 05/20/2020     (H) 02/20/2020    ALKPHOS 137 (H) 05/20/2020    BILITOT 1.6 (H) 05/20/2020       Lab Results   Component Value Date    WBC 3.70 (L) 05/20/2020    HGB 11.0 (L) 05/20/2020    HCT 33.4 (L) 05/20/2020    MCV 87 05/20/2020     (L) 05/20/2020       Lab Results   Component Value Date     05/20/2020    K 4.1 05/20/2020     05/20/2020    CO2 25 05/20/2020    BUN 13 05/20/2020    CREATININE 0.9 05/20/2020    CALCIUM 10.1 05/20/2020    ANIONGAP 9 05/20/2020    ESTGFRAFRICA >60.0 05/20/2020    EGFRNONAA >60.0 05/20/2020       No results found for: HAV, HEPAIGM, HEPBIGM, HEPBCAB, HBEAG, HEPCAB    No results found for: SMOOTHMUSCAB, MITOAB    I personally reviewed all recent lab results.  I personally reviewed imaging studies.    Assessment:  59 y.o. female presenting with     1. 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 Cholestatic pruritus    3. Thrombocytopenia due to hypersplenism    4. Portal hypertension    5. Hepatic encephalopathy    6. Fatty liver disease, nonalcoholic    7. Protein-calorie malnutrition, moderate      MELD-Na score: 9 at 5/20/2020  9:11 AM  MELD score: 9 at 5/20/2020  9:11 AM  Calculated from:  Serum Creatinine: 0.9 mg/dL (Rounded to 1 mg/dL) at 5/20/2020  9:11 AM  Serum Sodium: 138 mmol/L (Rounded to 137 mmol/L) at 5/20/2020  9:11 AM  Total Bilirubin: 1.6 mg/dL at 5/20/2020  9:11 AM  INR(ratio): 1.1 at 5/20/2020  9:11 AM  Age: 59 years    CPT: B    MELD- 9, fairly compensated since TIPS. Appears too early to undergo liver transplantation.  No edema or ascites. Low grade HE - on rifaximin, cannot tolerate  lactulose.  TIPS: doppler showed patent TIPS but changes in velocity suggesting stenosis.    Recommendation(s):  - patient will contact Dr. Herrera, her IR physician from [place] regarding TIPS revision  - if she cannot reach Dr. Herrera, instructed to call us back  - cholestyramine trial for intense cholestatic pruritus  - HCC screening next due in Nov 2020  - MELD labs and return to Hepatology in 3 months    A total of 25 minutes were spent face-to-face with the patient during this encounter and over half of that time was spent on counseling and coordination of care.  We discussed in depth the nature of the patient's liver disease, the management plan in details. I also educated the patient about lifestyle modifications which may improve hepatic steatosis, overweight/obesity, insulin resistance and high blood pressure issues. I have provided the patient with an opportunity to ask questions and have all questions answered to his satisfaction.     I have sent communication to the referring physician and/or primary care provider.    Trae Bermeo MD  Staff Physician  Hepatology and Liver Transplant  [facility]  [facility]

## 2020-05-20 NOTE — TELEPHONE ENCOUNTER
----- Message from Trae Bermeo MD sent at 5/20/2020  1:55 PM CDT -----  MELD remains low 9, no edema, stable. No indication for transplant now. Please close TXP episode. Schedule return in August in Hepatology.

## 2020-05-20 NOTE — LETTER
May 20, 2020        Earl Mehta  [address] [place] [address] [address] [address]  Phone: [phone]  Fax: [phone]             [facility]  [address]  [facility] [address]  Phone: [phone]   Patient: Anyi Conrad   MR Number: [id number]   YOB: 1960   Date of Visit: 5/20/2020       Dear Dr. Earl Mehta    Thank you for referring Anyi Conrad to me for evaluation. Attached you will find relevant portions of my assessment and plan of care.    If you have questions, please do not hesitate to call me. I look forward to following Anyi Conrad along with you.    Sincerely,    Trae Bermeo MD    Enclosure    If you would like to receive this communication electronically, please contact [email or web address] or [phone] to request [employer] access.    [employer] is a tool which provides read-only access to select patient information with whom you have a relationship. Its easy to use and provides real time access to review your patients record including encounter summaries, notes, results, and demographic information.    If you feel you have received this communication in error or would no longer like to receive these types of communications, please e-mail [email or web address]

## 2020-05-21 ENCOUNTER — TELEPHONE (OUTPATIENT)
Dept: TRANSPLANT | Facility: CLINIC | Age: 60
End: 2020-05-21

## 2020-05-21 ENCOUNTER — TELEPHONE (OUTPATIENT)
Dept: HEPATOLOGY | Facility: CLINIC | Age: 60
End: 2020-05-21

## 2020-05-21 NOTE — TELEPHONE ENCOUNTER
----- Message from Trae Bermeo MD sent at 5/20/2020  1:55 PM CDT -----  MELD remains low 9, no edema, stable. No indication for transplant now. Please close TXP episode. Schedule return in August in Hepatology.    =======================================    Transplant episode closed as recommended.  Appropriate staff notified.  Repeat u/s also recommended in three months.  Hepatology staff informed of the need to schedule when she returns to clinic.

## 2020-05-21 NOTE — TELEPHONE ENCOUNTER
----- Message from Trae Bermeo MD sent at 5/21/2020 10:30 AM CDT -----  Just labs. She does not need US until Nov.    ----- Message -----  From: Deidra Jimenes MA  Sent: 5/20/2020   2:12 PM CDT  To: Trae Bermeo MD Dr. Jean-Claude patient wants to know if she need any test done before her appointment in august.     Please advise    Thank you   ----- Message -----  From: Trae Bermeo MD  Sent: 5/20/2020   1:55 PM CDT  To: Jean-Claude Matamoros    MELD remains low 9, no edema, stable. No indication for transplant now. Please close TXP episode. Schedule return in August in Hepatology.

## 2020-06-01 ENCOUNTER — TELEPHONE (OUTPATIENT)
Dept: HEPATOLOGY | Facility: CLINIC | Age: 60
End: 2020-06-01

## 2020-06-01 NOTE — TELEPHONE ENCOUNTER
MA called pt she had questions about a prescription about cholesterol will forward to dr. Bermeo, also about her disc where to mail it to

## 2020-06-01 NOTE — TELEPHONE ENCOUNTER
Patient is requesting abd U/S CD done at [facility] on 5/20/20.  CD requested at radiology department. Spoke with Milena Foss.        ----- Message from Ramiro Malagon MA sent at 6/1/2020  2:02 PM CDT -----  Pt has CD for tips and was trying to see who to mail the disc to. Can you please call and advise

## 2020-06-01 NOTE — TELEPHONE ENCOUNTER
----- Message from Corrine Strauss sent at 6/1/2020 12:34 PM CDT -----  Contact: ot  Reason: Calling to speak with coordinator pertaining to orders, labs, and prescriptions    Communication: [phone]

## 2020-06-02 ENCOUNTER — TELEPHONE (OUTPATIENT)
Dept: HEPATOLOGY | Facility: CLINIC | Age: 60
End: 2020-06-02

## 2020-06-02 NOTE — TELEPHONE ENCOUNTER
Abd U/S CD mailed to     Dr Navid Herrera  Radiology associated   [address]  [place] [address]

## 2020-06-09 ENCOUNTER — TELEPHONE (OUTPATIENT)
Dept: HEPATOLOGY | Facility: CLINIC | Age: 60
End: 2020-06-09

## 2020-06-09 NOTE — TELEPHONE ENCOUNTER
MA called pt and informed her that the medication Cholestyramine was for itching, she stated she has been taking it and will continue .

## 2020-06-09 NOTE — TELEPHONE ENCOUNTER
----- Message from Trae Bermeo MD sent at 6/9/2020  1:31 PM CDT -----  Cholestyramine was for itching. She complained about itching. If she does not have itching, she does not need to take it.    NL  ----- Message -----  From: Ramiro Malagon MA  Sent: 6/1/2020   2:03 PM CDT  To: Trae Bermeo MD    Pt was asking about a medication dealing with cholesterol that you prescribed was asking did she really need to take this just needs conformation .

## 2020-06-23 ENCOUNTER — TELEPHONE (OUTPATIENT)
Dept: HEPATOLOGY | Facility: CLINIC | Age: 60
End: 2020-06-23

## 2020-06-23 NOTE — TELEPHONE ENCOUNTER
MA called pt and lvm to try and reschedule her august follow up with a different provider due to Dr. Bermeo is leaving will mail out her new appointment reminder.

## 2020-08-04 ENCOUNTER — TELEPHONE (OUTPATIENT)
Dept: HEPATOLOGY | Facility: CLINIC | Age: 60
End: 2020-08-04

## 2020-08-04 NOTE — TELEPHONE ENCOUNTER
----- Message from Inez Karimi sent at 8/4/2020 12:54 PM CDT -----  Regarding: Nurse Request  Contact: PT  PT called back to speak with Ramiro - was just on the phone with her - had a few more questions    Callback: [phone]

## 2020-08-04 NOTE — TELEPHONE ENCOUNTER
----- Message from Montse Ellis sent at 8/4/2020 11:41 AM CDT -----  Regarding: Returning phone call  Contact: Anyi Davalos is returning phone call made to her to change provider.      [phone]

## 2020-08-04 NOTE — TELEPHONE ENCOUNTER
MA called pt she wanted to do virtual due to transportation but we are no longer doing virtual for [place] she stated she will give us a call if her appt for Monday needs to be rescheduled.

## 2020-08-10 ENCOUNTER — HOSPITAL ENCOUNTER (OUTPATIENT)
Dept: RADIOLOGY | Facility: HOSPITAL | Age: 60
Discharge: HOME | End: 2020-08-10
Attending: INTERNAL MEDICINE
Payer: COMMERCIAL

## 2020-08-10 ENCOUNTER — OFFICE VISIT (OUTPATIENT)
Dept: HEPATOLOGY | Facility: CLINIC | Age: 60
End: 2020-08-10
Payer: MEDICARE

## 2020-08-10 ENCOUNTER — TELEPHONE (OUTPATIENT)
Dept: HEPATOLOGY | Facility: CLINIC | Age: 60
End: 2020-08-10

## 2020-08-10 VITALS
BODY MASS INDEX: 27.95 KG/M2 | DIASTOLIC BLOOD PRESSURE: 66 MMHG | OXYGEN SATURATION: 99 % | WEIGHT: 173.94 LBS | HEART RATE: 96 BPM | SYSTOLIC BLOOD PRESSURE: 145 MMHG | RESPIRATION RATE: 16 BRPM | HEIGHT: 66 IN

## 2020-08-10 DIAGNOSIS — K74.60 LIVER CIRRHOSIS SECONDARY TO NASH: ICD-10-CM

## 2020-08-10 DIAGNOSIS — Z95.828 S/P TIPS (TRANSJUGULAR INTRAHEPATIC PORTOSYSTEMIC SHUNT): ICD-10-CM

## 2020-08-10 DIAGNOSIS — E44.0 PROTEIN-CALORIE MALNUTRITION, MODERATE: ICD-10-CM

## 2020-08-10 DIAGNOSIS — K75.81 LIVER CIRRHOSIS SECONDARY TO NASH: ICD-10-CM

## 2020-08-10 DIAGNOSIS — D69.59 THROMBOCYTOPENIA DUE TO HYPERSPLENISM: ICD-10-CM

## 2020-08-10 DIAGNOSIS — Z95.828 S/P TIPS (TRANSJUGULAR INTRAHEPATIC PORTOSYSTEMIC SHUNT): Primary | ICD-10-CM

## 2020-08-10 DIAGNOSIS — D73.1 THROMBOCYTOPENIA DUE TO HYPERSPLENISM: ICD-10-CM

## 2020-08-10 DIAGNOSIS — R11.10 VOMITING, INTRACTABILITY OF VOMITING NOT SPECIFIED, PRESENCE OF NAUSEA NOT SPECIFIED, UNSPECIFIED VOMITING TYPE: ICD-10-CM

## 2020-08-10 DIAGNOSIS — K76.82 HEPATIC ENCEPHALOPATHY: ICD-10-CM

## 2020-08-10 DIAGNOSIS — L29.8 CHOLESTATIC PRURITUS: ICD-10-CM

## 2020-08-10 DIAGNOSIS — R11.0 NAUSEA: ICD-10-CM

## 2020-08-10 PROCEDURE — 93975 US ABDOMEN COMP WITH DOPPLER (XPD): ICD-10-PCS | Mod: 26,,, | Performed by: RADIOLOGY

## 2020-08-10 PROCEDURE — 93975 VASCULAR STUDY: CPT | Mod: TC

## 2020-08-10 PROCEDURE — 99999 PR PBB SHADOW E&M-EST. PATIENT-LVL V: CPT | Mod: PBBFAC,,, | Performed by: INTERNAL MEDICINE

## 2020-08-10 PROCEDURE — 99215 OFFICE O/P EST HI 40 MIN: CPT | Mod: PBBFAC,25 | Performed by: INTERNAL MEDICINE

## 2020-08-10 PROCEDURE — 93975 VASCULAR STUDY: CPT | Mod: 26,,, | Performed by: RADIOLOGY

## 2020-08-10 PROCEDURE — 99999 PR PBB SHADOW E&M-EST. PATIENT-LVL V: ICD-10-PCS | Mod: PBBFAC,,, | Performed by: INTERNAL MEDICINE

## 2020-08-10 PROCEDURE — 99214 OFFICE O/P EST MOD 30 MIN: CPT | Mod: S$PBB,,, | Performed by: INTERNAL MEDICINE

## 2020-08-10 PROCEDURE — 99214 PR OFFICE/OUTPT VISIT, EST, LEVL IV, 30-39 MIN: ICD-10-PCS | Mod: S$PBB,,, | Performed by: INTERNAL MEDICINE

## 2020-08-10 RX ORDER — LEVETIRACETAM 500 MG/1
500 TABLET ORAL 2 TIMES DAILY
COMMUNITY

## 2020-08-10 RX ORDER — MIRTAZAPINE 15 MG/1
15 TABLET, FILM COATED ORAL NIGHTLY
COMMUNITY

## 2020-08-10 RX ORDER — METOCLOPRAMIDE 5 MG/1
5 TABLET ORAL 4 TIMES DAILY
COMMUNITY

## 2020-08-10 RX ORDER — SUCRALFATE 1 G/1
1 TABLET ORAL 2 TIMES DAILY
COMMUNITY

## 2020-08-10 RX ORDER — INSULIN GLARGINE 100 [IU]/ML
80 INJECTION, SOLUTION SUBCUTANEOUS 2 TIMES DAILY
COMMUNITY

## 2020-08-10 RX ORDER — SPIRONOLACTONE 100 MG/1
100 TABLET, FILM COATED ORAL 2 TIMES DAILY
COMMUNITY

## 2020-08-10 NOTE — PATIENT INSTRUCTIONS
Recommendation(s):  - Abd pain nausea, vomiting, maybe due to ulcer or gastroparesis. Sh komal need an EGD, her local GI MD has scheduled her for 8/12/20.  So  should have it done in [facility].    -  Ultrasound complete with doppler - please schedule it for today or tomorrow.   -  Get all the studies on CD from [facility].  Then submit her case for discussion in [facility].    - cholestyramine trial for intense cholestatic pruritus  - HCC screening next due in Nov 2020  - MELD labs and return to Hepatology in 3 months

## 2020-08-10 NOTE — LETTER
August 10, 2020      Trae Bermeo MD  [address]  [facility] [address]           [facility] - Hepatology  [address]  [facility] [address]  Phone: [phone]  Fax: [phone]          Patient: Anyi Conrad   MR Number: [id number]   YOB: 1960   Date of Visit: 8/10/2020       Dear Dr. Trae Bermeo:    Thank you for referring Anyi Conrad to me for evaluation. Attached you will find relevant portions of my assessment and plan of care.    If you have questions, please do not hesitate to call me. I look forward to following Anyi Conrad along with you.    Sincerely,    Yelena Muñoz MD    Enclosure  CC:  No Recipients    If you would like to receive this communication electronically, please contact [email or web address] or [phone] to request more information on [employer] access.    For providers and/or their staff who would like to refer a patient to [facility], please contact us through our one-stop-shop provider referral line, [facility], at [phone].    If you feel you have received this communication in error or would no longer like to receive these types of communications, please e-mail [email or web address]

## 2020-08-10 NOTE — TELEPHONE ENCOUNTER
Received call from Olayinka with [facility] with a critical lab value.  Glucose 453. Read Back and Verified.  Patient in clinic with Dr Muñoz. States she is a Brittle Diabetic. My sugars can run High even if I take my medications. We were rushing to get her from [place] the traffic was bad.  Patient rechecked her Glucose with her cell phone to her Ac Patch and Glucose 415.  Continue to monitor diet and take prescribed medications.  Patient stated I am about to leave now and will take Insulin in car. Patient denies any sx's lightheadedness, dizziness, blurred vision, thirst. Dr Muñoz notified.

## 2020-08-10 NOTE — PROGRESS NOTES
Hepatology Consult Note    Referring provider: Dr. Trae Bermeo    Chief complaint:   No chief complaint on file.      HPI:  Anyi Conrad is a pleasant 60 y.o. female who was referred to Hepatology Clinic for consultation of had no chief complaint listed for this encounter..      1/27/2020:  Liver Transplant Declined - Temporarily too Well due to low MELD of 9, post-TIPS.      5/20/20: MELD: 9. Feels ok. US did not show ascites. No peripheral edema and stopped diuretics. C/o itching w/ little relief from anti-histamines. Takes rifaximin for grade 1 HE.     US - Findings consistent with liver cirrhosis. Sequela of portal hypertension including splenomegaly and splenic collaterals. Patent TIPS with mildly elevated velocity at the hepatic venous end possibly representing developing stenosis. Thrombosis of the left portal vein.    Interval History:  TIPS done in January 2020 at [facility] in [place], for GI bleeding with varices and ulcer in the cardia, and ascites. 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   She was told she needs pancreas transplant as well.  She had TIPS revision about 6 weeks ago, at [place].      Patient states she has gained wt 20 -25 lb some swelling of legs, and abdomen, nausea, uncontrollable vomiting, unresponsive to multiple meds, then she gets dehydrated, has DKA.  Now has itching.  Has had encephalopathy symptoms since before the TIPS, she does not think it got worse after the TIPS.               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    Cholestatic pruritus     Thrombocytopenia due to hypersplenism    Portal hypertension    Hepatic encephalopathy    Fatty liver disease, nonalcoholic    Protein-calorie malnutrition, moderate       Past Medical History:   Diagnosis Date    Allergy     Cirrhosis     Systemic lupus erythematosus        Past Surgical History:   Procedure Laterality Date    gallblader      HYSTERECTOMY      KNEE SURGERY Right        Family History   Problem Relation Age of Onset    Cancer Mother     Cancer Father        Social History     Socioeconomic History    Marital status: Unknown     Spouse name: Not on file    Number of children: Not on file    Years of education: Not on file    Highest education level: Not on file   Occupational History    Not on file   Social Needs    Financial resource strain: Not on file    Food insecurity     Worry: Not on file     Inability: Not on file    Transportation needs     Medical: Not on file     Non-medical: Not on file   Tobacco Use    Smoking status: Smoker, Current Status Unknown   Substance and Sexual Activity    Alcohol use: Not Currently    Drug use: Never    Sexual activity: Not on file   Lifestyle    Physical activity     Days per week: Not on file     Minutes per session: Not on file    Stress: Not on file   Relationships    Social connections     Talks on phone: Not on file     Gets together: Not on file     Attends [employer] service: Not on file     Active member of club or organization: Not on file     Attends meetings of clubs or organizations: Not on file     Relationship status: Not on file   Other Topics Concern    Not on file   Social History Narrative    Not on file       Current Outpatient Medications   Medication Sig Dispense Refill    ALPRAZolam (XANAX) 0.5 MG tablet Take 0.5 mg by mouth nightly as needed.       aluminum & magnesium hydroxide-simethicone (MYLANTA MAX STRENGTH) 400-400-40 mg/5 mL suspension Take by mouth every 6 (six) hours as needed for Indigestion.       cholestyramine (QUESTRAN) 4 gram packet Take 1 packet (4 g total) by mouth 3 (three) times daily with meals. 270 packet 3    desvenlafaxine succinate (PRISTIQ) 50 MG Tb24 Take 50 mg by mouth once daily.      docusate sodium (COLACE ORAL) Take by mouth once daily.      doxepin (SINEQUAN) 10 MG capsule Take 10 mg by mouth every evening.      esomeprazole (NEXIUM) 40 MG capsule Take 40 mg by mouth before breakfast.      ferrous sulfate (FEOSOL) 325 mg (65 mg iron) Tab tablet Take 325 mg by mouth 2 (two) times daily.      hydroxyzine HCL (ATARAX) 25 MG tablet Take 50 mg by mouth 3 (three) times daily as needed for Itching.      magnesium oxide (MAG-OX) 400 mg (241.3 mg magnesium) tablet Take 400 mg by mouth once daily.      magnesium salicylate (DOANS PILLS ORAL) Take 1 tablet by mouth every evening.      metaxalone (SKELAXIN) 800 MG tablet Take 800 mg by mouth 3 (three) times daily.      multivitamin/iron/folic acid (CENTRUM COMPLETE ORAL) Take by mouth.      nadolol (CORGARD) 20 MG tablet Take 20 mg by mouth once daily.      oxyCODONE (OXAYDO) 5 mg TbOr Take by mouth 2 (two) times daily.       pantoprazole (PROTONIX) 40 MG tablet Take 40 mg by mouth once daily.      pregabalin (LYRICA) 75 MG capsule Take 75 mg by mouth 3 (three) times daily.      promethazine (PHENERGAN) 25 MG tablet Take 25 mg by mouth every 4 (four) hours.      rifAXImin (XIFAXAN) 200 mg Tab Take 550 mg by mouth 2 (two) times daily.        No current facility-administered medications for this visit.        Review of patient's allergies indicates:   Allergen Reactions    Albuterol Anaphylaxis    Quinine     Tramadol Other (See Comments)       Review of Systems   Constitutional: Positive for malaise/fatigue.   Respiratory: Negative for shortness of breath.    Cardiovascular: Negative for chest pain and leg swelling.   Gastrointestinal: Negative for abdominal pain, blood in stool, constipation, diarrhea, heartburn, nausea and  vomiting.   Skin: Positive for itching. Negative for rash.   Neurological: Positive for weakness.   Endo/Heme/Allergies: Bruises/bleeds easily.       There were no vitals filed for this visit.    Physical Exam  Vitals signs and nursing note reviewed.   Constitutional:       Appearance: She is well-developed.      Comments: Chronically ill-appearing. Malnourished. Temporal wasting.     Eyes:      General: No scleral icterus.  Cardiovascular:      Rate and Rhythm: Normal rate and regular rhythm.   Pulmonary:      Effort: Pulmonary effort is normal. No respiratory distress.      Breath sounds: Normal breath sounds.   Abdominal:      General: Bowel sounds are normal. There is no distension.      Palpations: Abdomen is soft.      Tenderness: There is no abdominal tenderness.   Skin:     Findings: No erythema.      Comments: Palmar erythema. Scattered spider angiomata on upper chest           LABS: I personally reviewed pertinent laboratory findings.    Lab Results   Component Value Date    ALT 23 05/20/2020    AST 36 05/20/2020     (H) 02/20/2020    ALKPHOS 137 (H) 05/20/2020    BILITOT 1.6 (H) 05/20/2020       Lab Results   Component Value Date    WBC 3.70 (L) 05/20/2020    HGB 11.0 (L) 05/20/2020    HCT 33.4 (L) 05/20/2020    MCV 87 05/20/2020     (L) 05/20/2020       Lab Results   Component Value Date     05/20/2020    K 4.1 05/20/2020     05/20/2020    CO2 25 05/20/2020    BUN 13 05/20/2020    CREATININE 0.9 05/20/2020    CALCIUM 10.1 05/20/2020    ANIONGAP 9 05/20/2020    ESTGFRAFRICA >60.0 05/20/2020    EGFRNONAA >60.0 05/20/2020       No results found for: HAV, HEPAIGM, HEPBIGM, HEPBCAB, HBEAG, HEPCAB    No results found for: SMOOTHMUSCAB, MITOAB    I personally reviewed all recent lab results.  I personally reviewed imaging studies.    Assessment:  60 y.o. female presenting with     No diagnosis found.  MELD-Na score: 9 at 5/20/2020  9:11 AM  MELD score: 9 at 5/20/2020  9:11  AM  Calculated from:  Serum Creatinine: 0.9 mg/dL (Rounded to 1 mg/dL) at 5/20/2020  9:11 AM  Serum Sodium: 138 mmol/L (Rounded to 137 mmol/L) at 5/20/2020  9:11 AM  Total Bilirubin: 1.6 mg/dL at 5/20/2020  9:11 AM  INR(ratio): 1.1 at 5/20/2020  9:11 AM  Age: 59 years 9 months    CPT: B    MELD- 9, fairly compensated since TIPS (inserted in Jan 2020) at [facility] in [place].  About 6 weeks ago, TIPS was revised, and has symptoms of abd swelling, nausea, vomiting.    Too early to undergo liver transplantation.  Trace edema and very small ascites if any.  Low grade HE - on rifaximin, cannot tolerate lactulose.  TIPS: doppler showed patent TIPS but changes in velocity suggesting stenosis.  TIPS was revised about 6 weeks ago.  She does not know what was done.      Recommendation(s):  - Abd pain nausea, vomiting, maybe due to ulcer or gastroparesis. Sharon komal need an EGD, her local GI MD has scheduled her for 8/12/20.  So  should have it done in [place].    -  Ultrasound complete with doppler - please schedule it for today or tomorrow.   -  Get all the studies on CD from [place].  Then submit her case for discussion in [place].    - cholestyramine trial for intense cholestatic pruritus  - HCC screening next due in Nov 2020  - MELD labs and return to Hepatology in 3 months      Yelena Muñoz MD

## 2020-08-10 NOTE — Clinical Note
Recommendation(s):  - Abd pain nausea, vomiting, maybe due to ulcer or gastroparesis. Sh komal need an EGD, her local GI MD has scheduled her for 8/12/20.  So  should have it done in [place].    -  Ultrasound complete with doppler - please schedule it for today or tomorrow.   -  Get all the studies on CD from [place].  Then submit her case for discussion in [place].    - cholestyramine trial for intense cholestatic pruritus  - HCC screening next due in Nov 2020  - MELD labs and return to Hepatology in 3 months

## 2020-08-11 ENCOUNTER — TELEPHONE (OUTPATIENT)
Dept: HEPATOLOGY | Facility: CLINIC | Age: 60
End: 2020-08-11

## 2020-08-11 NOTE — TELEPHONE ENCOUNTER
----- Message from Yelena Muñoz MD sent at 8/10/2020  3:33 PM CDT -----  Recommendation(s):- Abd pain nausea, vomiting, maybe due to ulcer or gastroparesis. Sharon sauceda need an EGD, her local GI MD has scheduled her for 8/12/20.  So sh should have it done in [place].  -  Ultrasound complete with doppler - please schedule it for today or tomorrow. -  Get all the studies on CD from [place].  Then submit her case for discussion in [place].  - cholestyramine trial for intense cholestatic pruritus- HCC screening next due in Nov 2020- MELD labs and return to Hepatology in 3 months

## 2020-08-11 NOTE — TELEPHONE ENCOUNTER
----- Message from Gail Younger sent at 8/11/2020  3:13 PM CDT -----  Contact: pt  Patient calling to speak with nurse    [phone]   Call Back :

## 2020-08-11 NOTE — TELEPHONE ENCOUNTER
Called patient back she said that she could not get a sooner appt for her EGD locally she said that they want to schedule it in October.     She want Dr. Muñoz to put order in here so she can get this done here. Inform patient that I will ask the provider and we are going to call her back.

## 2020-08-11 NOTE — TELEPHONE ENCOUNTER
Called patient regarding her DISC she said that she have it at home and she will mail it to us. Recall entered for 3 months.

## 2020-08-13 ENCOUNTER — TELEPHONE (OUTPATIENT)
Dept: HEPATOLOGY | Facility: CLINIC | Age: 60
End: 2020-08-13

## 2020-08-13 NOTE — TELEPHONE ENCOUNTER
----- Message from Ramiro Malagon MA sent at 8/12/2020  4:07 PM CDT -----    ----- Message -----  From: Yelena Muñoz MD  Sent: 8/12/2020   5:28 AM CDT  To: Toni Kirk Staff    Pl inform patient: TIPS is open.  Flow in the appropriate direction.  Please get all imaging from [facility] in [place], downloaded on CD, to review in IR conference for TIPS function.

## 2020-08-18 ENCOUNTER — TELEPHONE (OUTPATIENT)
Dept: HEPATOLOGY | Facility: CLINIC | Age: 60
End: 2020-08-18

## 2020-08-18 NOTE — TELEPHONE ENCOUNTER
Called patient back, spoke to patient sister, she said that patient having abdominal pain and dark stool. Advice patient sister to bring patient to ED. Per patient sister patient does not want to go to ED because per patient they are not doing anything for her.     Advice patient sister to monitor patient and if she continue to have black stool and abdominal pain she definitely need to go to ED.     Patient sister will put the DISC in the mail tomorrow 8/19.

## 2020-08-18 NOTE — TELEPHONE ENCOUNTER
----- Message from Deidra Jimenes MA sent at 8/17/2020  4:33 PM CDT -----  Contact: pt    ----- Message -----  From: Gail Younger  Sent: 8/17/2020   3:08 PM CDT  To: Toni Kirk Staff    Patient calling to speak with nurse about results  Pt trying to scheduled EGD               Call Back : [phone]

## 2020-08-19 ENCOUNTER — TELEPHONE (OUTPATIENT)
Dept: ENDOSCOPY | Facility: HOSPITAL | Age: 60
End: 2020-08-19

## 2020-08-19 DIAGNOSIS — K74.60 HEPATIC CIRRHOSIS, UNSPECIFIED HEPATIC CIRRHOSIS TYPE, UNSPECIFIED WHETHER ASCITES PRESENT: Primary | ICD-10-CM

## 2020-08-24 ENCOUNTER — TELEPHONE (OUTPATIENT)
Dept: HEPATOLOGY | Facility: CLINIC | Age: 60
End: 2020-08-24

## 2020-08-24 NOTE — TELEPHONE ENCOUNTER
----- Message from Francisco Elaine sent at 8/24/2020  9:16 AM CDT -----  Pt calling to reschedule her 8/27 appt      [phone]

## 2020-09-10 ENCOUNTER — ANESTHESIA (OUTPATIENT)
Dept: ENDOSCOPY | Facility: HOSPITAL | Age: 60
End: 2020-09-10
Payer: COMMERCIAL

## 2020-09-10 ENCOUNTER — ANESTHESIA EVENT (OUTPATIENT)
Dept: ENDOSCOPY | Facility: HOSPITAL | Age: 60
End: 2020-09-10
Payer: COMMERCIAL

## 2020-09-10 ENCOUNTER — HOSPITAL ENCOUNTER (OUTPATIENT)
Facility: HOSPITAL | Age: 60
Discharge: HOME | End: 2020-09-10
Attending: INTERNAL MEDICINE | Admitting: INTERNAL MEDICINE
Payer: COMMERCIAL

## 2020-09-10 VITALS
DIASTOLIC BLOOD PRESSURE: 67 MMHG | TEMPERATURE: 98 F | HEART RATE: 80 BPM | BODY MASS INDEX: 27.8 KG/M2 | WEIGHT: 173 LBS | SYSTOLIC BLOOD PRESSURE: 123 MMHG | OXYGEN SATURATION: 99 % | RESPIRATION RATE: 18 BRPM | HEIGHT: 66 IN

## 2020-09-10 DIAGNOSIS — K74.60 CIRRHOSIS: ICD-10-CM

## 2020-09-10 DIAGNOSIS — K75.81 LIVER CIRRHOSIS SECONDARY TO NASH: Primary | ICD-10-CM

## 2020-09-10 DIAGNOSIS — K74.60 LIVER CIRRHOSIS SECONDARY TO NASH: Primary | ICD-10-CM

## 2020-09-10 LAB
POCT GLUCOSE: 213 MG/DL (ref 70–110)
SARS-COV-2 RDRP RESP QL NAA+PROBE: NEGATIVE

## 2020-09-10 PROCEDURE — 25000003 PHARM REV CODE 250: Performed by: INTERNAL MEDICINE

## 2020-09-10 PROCEDURE — 82962 GLUCOSE BLOOD TEST: CPT | Performed by: INTERNAL MEDICINE

## 2020-09-10 PROCEDURE — E9220 PRA ENDO ANESTHESIA: HCPCS | Mod: ,,, | Performed by: NURSE ANESTHETIST, CERTIFIED REGISTERED

## 2020-09-10 PROCEDURE — 43235 EGD DIAGNOSTIC BRUSH WASH: CPT | Performed by: INTERNAL MEDICINE

## 2020-09-10 PROCEDURE — E9220 PRA ENDO ANESTHESIA: ICD-10-PCS | Mod: ,,, | Performed by: NURSE ANESTHETIST, CERTIFIED REGISTERED

## 2020-09-10 PROCEDURE — 63600175 PHARM REV CODE 636 W HCPCS: Performed by: NURSE ANESTHETIST, CERTIFIED REGISTERED

## 2020-09-10 PROCEDURE — 37000009 HC ANESTHESIA EA ADD 15 MINS: Performed by: INTERNAL MEDICINE

## 2020-09-10 PROCEDURE — 37000008 HC ANESTHESIA 1ST 15 MINUTES: Performed by: INTERNAL MEDICINE

## 2020-09-10 PROCEDURE — U0002 COVID-19 LAB TEST NON-CDC: HCPCS

## 2020-09-10 PROCEDURE — 43235 PR EGD, FLEX, DIAGNOSTIC: ICD-10-PCS | Mod: ,,, | Performed by: INTERNAL MEDICINE

## 2020-09-10 PROCEDURE — 43235 EGD DIAGNOSTIC BRUSH WASH: CPT | Mod: ,,, | Performed by: INTERNAL MEDICINE

## 2020-09-10 RX ORDER — SODIUM CHLORIDE 9 MG/ML
INJECTION, SOLUTION INTRAVENOUS CONTINUOUS
Status: DISCONTINUED | OUTPATIENT
Start: 2020-09-10 | End: 2020-09-10 | Stop reason: HOSPADM

## 2020-09-10 RX ORDER — PROPOFOL 10 MG/ML
VIAL (ML) INTRAVENOUS
Status: DISCONTINUED | OUTPATIENT
Start: 2020-09-10 | End: 2020-09-10

## 2020-09-10 RX ORDER — LIDOCAINE HYDROCHLORIDE 20 MG/ML
INJECTION INTRAVENOUS
Status: DISCONTINUED | OUTPATIENT
Start: 2020-09-10 | End: 2020-09-10

## 2020-09-10 RX ORDER — PROPOFOL 10 MG/ML
VIAL (ML) INTRAVENOUS CONTINUOUS PRN
Status: DISCONTINUED | OUTPATIENT
Start: 2020-09-10 | End: 2020-09-10

## 2020-09-10 RX ADMIN — PROPOFOL 50 MG: 10 INJECTION, EMULSION INTRAVENOUS at 01:09

## 2020-09-10 RX ADMIN — SODIUM CHLORIDE: 0.9 INJECTION, SOLUTION INTRAVENOUS at 01:09

## 2020-09-10 RX ADMIN — PROPOFOL 150 MCG/KG/MIN: 10 INJECTION, EMULSION INTRAVENOUS at 01:09

## 2020-09-10 RX ADMIN — LIDOCAINE HYDROCHLORIDE 50 MG: 20 INJECTION, SOLUTION INTRAVENOUS at 01:09

## 2020-09-10 NOTE — [employer] PATIENT INSTRUCTIONS
Discharge Summary/Instructions after an Endoscopic Procedure  Patient Name: Anyi Conrad  Patient MRN: [id number]  Patient YOB: 1960  Thursday, September 10, 2020  Oren Foss MD  RESTRICTIONS:  During your procedure today, you received medications for sedation.  These   medications may affect your judgment, balance and coordination.  Therefore,   for 24 hours, you have the following restrictions:   - DO NOT drive a car, operate machinery, make legal/financial decisions,   sign important papers or drink alcohol.    ACTIVITY:  Today: no heavy lifting, straining or running due to procedural   sedation/anesthesia.  The following day: return to full activity including work.  DIET:  Eat and drink normally unless instructed otherwise.     TREATMENT FOR COMMON SIDE EFFECTS:  - Mild abdominal pain, nausea, belching, bloating or excessive gas:  rest,   eat lightly and use a heating pad.  - Sore Throat: treat with throat lozenges and/or gargle with warm salt   water.  - Because air was used during the procedure, expelling large amounts of air   from your rectum or belching is normal.  - If a bowel prep was taken, you may not have a bowel movement for 1-3 days.    This is normal.  SYMPTOMS TO WATCH FOR AND REPORT TO YOUR PHYSICIAN:  1. Abdominal pain or bloating, other than gas cramps.  2. Chest pain.  3. Back pain.  4. Signs of infection such as: chills or fever occurring within 24 hours   after the procedure.  5. Rectal bleeding, which would show as bright red, maroon, or black stools.   (A tablespoon of blood from the rectum is not serious, especially if   hemorrhoids are present.)  6. Vomiting.  7. Weakness or dizziness.  GO DIRECTLY TO THE NEAREST EMERGENCY ROOM IF YOU HAVE ANY OF THE FOLLOWING:      Difficulty breathing              Chills and/or fever over 101 F   Persistent vomiting and/or vomiting blood   Severe abdominal pain   Severe chest pain   Black, tarry stools   Bleeding- more than one  tablespoon   Any other symptom or condition that you feel may need urgent attention  Your doctor recommends these additional instructions:  If any biopsies were taken, your doctors clinic will contact you in 1 to 2   weeks with any results.  - Patient has a contact number available for emergencies.  The signs and   symptoms of potential delayed complications were discussed with the   patient.  Return to normal activities tomorrow.  Written discharge   instructions were provided to the patient.   - Discharge patient to home.   - Resume previous diet.   - Continue present medications.   - Return to referring physician.   For questions, problems or results please call your physician - Oren Foss MD at Work:  [phone].  [facility], EMERGENCY ROOM PHONE NUMBER: [phone]  IF A COMPLICATION OR EMERGENCY SITUATION ARISES AND YOU ARE UNABLE TO REACH   YOUR PHYSICIAN - GO DIRECTLY TO THE EMERGENCY ROOM.  Oren Foss MD  9/10/2020 1:34:33 PM  This report has been verified and signed electronically.  [employer]

## 2020-09-10 NOTE — ANESTHESIA POSTPROCEDURE EVALUATION
Anesthesia Post Evaluation    Patient: Anyi Conrad    Procedure(s) Performed: Procedure(s) (LRB):  ESOPHAGOGASTRODUODENOSCOPY (EGD) (N/A)    Final Anesthesia Type: general    Patient location during evaluation: PACU  Patient participation: Yes- Able to Participate  Level of consciousness: awake and alert, awake and oriented  Post-procedure vital signs: reviewed and stable  Pain management: adequate  Airway patency: patent    PONV status at discharge: No PONV  Anesthetic complications: no      Cardiovascular status: blood pressure returned to baseline, hemodynamically stable and stable  Respiratory status: unassisted, spontaneous ventilation and room air  Hydration status: euvolemic  Follow-up not needed.          Vitals Value Taken Time   /67 09/10/20 1405   Temp 98 09/10/20 1430   Pulse 80 09/10/20 1405   Resp 18 09/10/20 1405   SpO2 99 % 09/10/20 1405         Event Time   Out of Recovery 14:20:24         Pain/Leon Score: Leon Score: 10 (9/10/2020  2:05 PM)

## 2020-09-10 NOTE — H&P
Short Stay Endoscopy History and Physical    PCP - Primary Doctor No    Procedure - EGD  Sedation: GA  ASA - per anesthesia  Mallampati - per anesthesia  History of Anesthesia problems - no  Family history Anesthesia problems -  no     HPI:  This is a 60 y.o. female here for evaluation of : Cirrhosis of liver, Varices screening    Reflux - no  Dysphagia - no  Abdominal pain - no  Diarrhea - no    ROS:  Constitutional: No fevers, chills, No weight loss  ENT: No allergies  CV: No chest pain  Pulm: No cough, No shortness of breath  Ophtho: No vision changes  GI: see HPI  Medical History:  has a past medical history of Allergy, Cirrhosis, Diabetes mellitus, and Systemic lupus erythematosus.    Surgical History:  has a past surgical history that includes Hysterectomy; gallblader; Knee surgery (Right); and TIPS procedure.    Family History: family history includes Cancer in her father and mother.. Otherwise no colon cancer, inflammatory bowel disease, or GI malignancies.    Social History:  reports that she has been smoking. She does not have any smokeless tobacco history on file. She reports previous alcohol use. She reports that she does not use drugs.    Review of patient's allergies indicates:   Allergen Reactions    Albuterol Anaphylaxis    Lactulose     Quinine     Tramadol Other (See Comments)       Medications:   Medications Prior to Admission   Medication Sig Dispense Refill Last Dose    ALPRAZolam (XANAX) 0.5 MG tablet Take 0.5 mg by mouth nightly as needed.        aluminum & magnesium hydroxide-simethicone (MYLANTA MAX STRENGTH) 400-400-40 mg/5 mL suspension Take by mouth every 6 (six) hours as needed for Indigestion.       cholestyramine (QUESTRAN) 4 gram packet Take 1 packet (4 g total) by mouth 3 (three) times daily with meals. 270 packet 3     desvenlafaxine succinate (PRISTIQ) 50 MG Tb24 Take 50 mg by mouth once daily.       docusate sodium (COLACE ORAL) Take by mouth once daily.       doxepin  (SINEQUAN) 10 MG capsule Take 10 mg by mouth every evening.       esomeprazole (NEXIUM) 40 MG capsule Take 40 mg by mouth before breakfast.       ferrous sulfate (FEOSOL) 325 mg (65 mg iron) Tab tablet Take 325 mg by mouth 2 (two) times daily.       hydroxyzine HCL (ATARAX) 25 MG tablet Take 50 mg by mouth 3 (three) times daily as needed for Itching.       insulin (LANTUS SOLOSTAR U-100 INSULIN) glargine 100 units/mL (3mL) SubQ pen Inject 80 Units into the skin 2 (two) times a day.       insulin lispro protamin-lispro 100 unit/mL (50-50) InPn Inject into the skin. Sliding scale       levETIRAcetam (KEPPRA) 500 MG Tab Take 500 mg by mouth 2 (two) times daily.       magnesium oxide (MAG-OX) 400 mg (241.3 mg magnesium) tablet Take 400 mg by mouth once daily.       magnesium salicylate (DOANS PILLS ORAL) Take 1 tablet by mouth every evening.       metaxalone (SKELAXIN) 800 MG tablet Take 800 mg by mouth 3 (three) times daily.       metoclopramide HCl (REGLAN) 5 MG tablet Take 5 mg by mouth 4 (four) times daily.       mirtazapine (REMERON) 15 MG tablet Take 15 mg by mouth every evening.       multivitamin/iron/folic acid (CENTRUM COMPLETE ORAL) Take by mouth.       nadolol (CORGARD) 20 MG tablet Take 20 mg by mouth once daily.       oxyCODONE (OXAYDO) 5 mg TbOr Take by mouth 2 (two) times daily.        pantoprazole (PROTONIX) 40 MG tablet Take 40 mg by mouth once daily.       pregabalin (LYRICA) 75 MG capsule Take 75 mg by mouth 3 (three) times daily.       promethazine (PHENERGAN) 25 MG tablet Take 25 mg by mouth every 4 (four) hours.       rifAXImin (XIFAXAN) 200 mg Tab Take 550 mg by mouth 2 (two) times daily.        spironolactone (ALDACTONE) 100 MG tablet Take 100 mg by mouth 2 (two) times a day.       sucralfate (CARAFATE) 1 gram tablet Take 1 g by mouth 2 (two) times a day.          Objective Findings:    Vital Signs: Per nursing notes.    Physical Exam:  General Appearance: Well appearing in  no acute distress  Head:   Normocephalic, without obvious abnormality  Eyes:    No scleral icterus  Airway: Open  Neck: No restriction in mobility  Lungs: CTA bilaterally in anterior and posterior fields, no wheezes, no crackles.  Heart:  Regular rate and rhythm, S1, S2 normal, no murmurs heard  Abdomen: Soft, non tender, non distended      Labs:  Lab Results   Component Value Date    WBC 3.48 (L) 09/10/2020    HGB 10.2 (L) 09/10/2020    HCT 30.4 (L) 09/10/2020    PLT 74 (L) 09/10/2020    ALT 35 08/10/2020    AST 43 (H) 08/10/2020     (L) 08/10/2020    K 4.1 08/10/2020     08/10/2020    CREATININE 1.1 08/10/2020    BUN 17 08/10/2020    CO2 24 08/10/2020    INR 1.1 09/10/2020         I have explained the risks and benefits of endoscopy procedures to the patient including but not limited to bleeding, perforation, infection, and death.    Thank you so much for allowing me to participate in the care of Anyi Foss MD

## 2020-09-10 NOTE — DISCHARGE INSTRUCTIONS

## 2020-09-10 NOTE — TRANSFER OF CARE
"Anesthesia Transfer of Care Note    Patient: Anyi Conrad    Procedure(s) Performed: Procedure(s) (LRB):  ESOPHAGOGASTRODUODENOSCOPY (EGD) (N/A)    Patient location: PACU    Anesthesia Type: general    Transport from OR: Transported from OR on room air with adequate spontaneous ventilation    Post pain: adequate analgesia    Post assessment: tolerated procedure well and no apparent anesthetic complications    Post vital signs: stable    Level of consciousness: awake and alert    Nausea/Vomiting: no nausea/vomiting    Complications: none    Transfer of care protocol was followed      Last vitals:   Visit Vitals  BP (!) 140/67 (BP Location: Left arm, Patient Position: Lying)   Pulse 85   Temp 36.6 °C (97.9 °F) (Temporal)   Resp 20   Ht 5' 6" (1.676 m)   Wt 78.5 kg (173 lb)   SpO2 100%   BMI 27.92 kg/m²     "

## 2020-09-10 NOTE — ANESTHESIA PREPROCEDURE EVALUATION
09/10/2020  Anyi Conrad is a 60 y.o., female.    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      Current Outpatient Medications on File Prior to Encounter   Medication Sig Dispense Refill    ALPRAZolam (XANAX) 0.5 MG tablet Take 0.5 mg by mouth nightly as needed.       aluminum & magnesium hydroxide-simethicone (MYLANTA MAX STRENGTH) 400-400-40 mg/5 mL suspension Take by mouth every 6 (six) hours as needed for Indigestion.      cholestyramine (QUESTRAN) 4 gram packet Take 1 packet (4 g total) by mouth 3 (three) times daily with meals. 270 packet 3    docusate sodium (COLACE ORAL) Take by mouth once daily.      ferrous sulfate (FEOSOL) 325 mg (65 mg iron) Tab tablet Take 325 mg by mouth 2 (two) times daily.      hydroxyzine HCL (ATARAX) 25 MG tablet Take 50 mg by mouth 3 (three) times daily as needed for Itching.      insulin (LANTUS SOLOSTAR U-100 INSULIN) glargine 100 units/mL (3mL) SubQ pen Inject 80 Units into the skin 2 (two) times a day.      insulin lispro protamin-lispro 100 unit/mL (50-50) InPn Inject into the skin. Sliding scale      levETIRAcetam (KEPPRA) 500 MG Tab Take 500 mg by mouth 2 (two) times daily.      magnesium oxide (MAG-OX) 400 mg (241.3 mg magnesium) tablet Take 400 mg by mouth once daily.      magnesium salicylate (DOANS PILLS ORAL) Take 1 tablet by  mouth every evening.      metaxalone (SKELAXIN) 800 MG tablet Take 800 mg by mouth 3 (three) times daily.      metoclopramide HCl (REGLAN) 5 MG tablet Take 5 mg by mouth 4 (four) times daily.      mirtazapine (REMERON) 15 MG tablet Take 15 mg by mouth every evening.      multivitamin/iron/folic acid (CENTRUM COMPLETE ORAL) Take by mouth.      oxyCODONE (OXAYDO) 5 mg TbOr Take by mouth 2 (two) times daily.       pregabalin (LYRICA) 75 MG capsule Take 75 mg by mouth 3 (three) times daily.      promethazine (PHENERGAN) 25 MG tablet Take 25 mg by mouth every 4 (four) hours.      rifAXImin (XIFAXAN) 200 mg Tab Take 550 mg by mouth 2 (two) times daily.       spironolactone (ALDACTONE) 100 MG tablet Take 100 mg by mouth 2 (two) times a day.      sucralfate (CARAFATE) 1 gram tablet Take 1 g by mouth 2 (two) times a day.      desvenlafaxine succinate (PRISTIQ) 50 MG Tb24 Take 50 mg by mouth once daily.      doxepin (SINEQUAN) 10 MG capsule Take 10 mg by mouth every evening.      esomeprazole (NEXIUM) 40 MG capsule Take 40 mg by mouth before breakfast.      nadolol (CORGARD) 20 MG tablet Take 20 mg by mouth once daily.      pantoprazole (PROTONIX) 40 MG tablet Take 40 mg by mouth once daily.           Anesthesia Evaluation    I have reviewed the Patient Summary Reports.      I have reviewed the Medications.     Review of Systems  Anesthesia Hx:   Denies Personal Hx of Anesthesia complications.   Neurological:  Seizure Disorder , Most recent seizure occurred >1 year ago    Endocrine:  Diabetes, Type 2 Diabetes , controlled by insulin.        Physical Exam  General:  Well nourished    Airway/Jaw/Neck:  Airway Findings: Mouth Opening: Normal Tongue: Normal  General Airway Assessment: Adult  Mallampati: II  TM Distance: Normal, at least 6 cm      Dental:  Dental Findings: In tact   Chest/Lungs:  Chest/Lungs Findings: Clear to auscultation, Normal Respiratory Rate     Heart/Vascular:  Heart Findings: Rate: Normal   Rhythm: Regular Rhythm  Sounds: Normal        Mental Status:  Mental Status Findings:  Cooperative, Alert and Oriented         Anesthesia Plan  Type of Anesthesia, risks & benefits discussed:  Anesthesia Type:  general  Patient's Preference:   Intra-op Monitoring Plan: standard ASA monitors  Intra-op Monitoring Plan Comments:   Post Op Pain Control Plan: per primary service following discharge from PACU  Post Op Pain Control Plan Comments:   Induction:   IV  Beta Blocker:  Patient is not currently on a Beta-Blocker (No further documentation required).       Informed Consent: Patient understands risks and agrees with Anesthesia plan.  Questions answered. Anesthesia consent signed with patient.  ASA Score: 2     Day of Surgery Review of History & Physical: I have interviewed and examined the patient. I have reviewed the patient's H&P dated:  There are no significant changes.  H&P update referred to the provider.     Anesthesia Plan Notes: Patient states she no longer takes Nadonol        Ready For Surgery From Anesthesia Perspective.

## 2020-12-01 ENCOUNTER — TELEPHONE (OUTPATIENT)
Dept: ONCOLOGY | Facility: CLINIC | Age: 60
End: 2020-12-01

## 2020-12-01 NOTE — TELEPHONE ENCOUNTER
Caller: fercho andrade    Relationship to patient: self    Best call back number: [phone]    Pt called and requests a follow up appt asap with dr. Mcdowell. Pt is a active treatment pt.

## 2020-12-04 ENCOUNTER — OFFICE VISIT (OUTPATIENT)
Dept: ONCOLOGY | Facility: CLINIC | Age: 60
End: 2020-12-04

## 2020-12-04 ENCOUNTER — LAB (OUTPATIENT)
Dept: OTHER | Facility: HOSPITAL | Age: 60
End: 2020-12-04

## 2020-12-04 VITALS
DIASTOLIC BLOOD PRESSURE: 80 MMHG | WEIGHT: 182.7 LBS | OXYGEN SATURATION: 98 % | BODY MASS INDEX: 27.69 KG/M2 | HEIGHT: 68 IN | SYSTOLIC BLOOD PRESSURE: 152 MMHG | HEART RATE: 90 BPM | TEMPERATURE: 98 F | RESPIRATION RATE: 16 BRPM

## 2020-12-04 DIAGNOSIS — K74.69 OTHER CIRRHOSIS OF LIVER (HCC): ICD-10-CM

## 2020-12-04 DIAGNOSIS — D69.6 THROMBOCYTOPENIA (HCC): ICD-10-CM

## 2020-12-04 DIAGNOSIS — I85.11 SECONDARY ESOPHAGEAL VARICES WITH BLEEDING (HCC): ICD-10-CM

## 2020-12-04 DIAGNOSIS — D61.818 PANCYTOPENIA (HCC): Primary | ICD-10-CM

## 2020-12-04 DIAGNOSIS — D50.9 IRON DEFICIENCY ANEMIA, UNSPECIFIED IRON DEFICIENCY ANEMIA TYPE: ICD-10-CM

## 2020-12-04 DIAGNOSIS — D69.3 ACUTE ITP (HCC): ICD-10-CM

## 2020-12-04 DIAGNOSIS — D73.1 HYPERSPLENISM: Primary | ICD-10-CM

## 2020-12-04 LAB
ALBUMIN SERPL-MCNC: 3.6 G/DL (ref 3.5–5.2)
ALBUMIN/GLOB SERPL: 1.3 G/DL
ALP SERPL-CCNC: 198 U/L (ref 39–117)
ALT SERPL W P-5'-P-CCNC: 40 U/L (ref 1–33)
ANION GAP SERPL CALCULATED.3IONS-SCNC: 5.9 MMOL/L (ref 5–15)
ANISOCYTOSIS BLD QL: NORMAL
AST SERPL-CCNC: 48 U/L (ref 1–32)
BASOPHILS # BLD AUTO: 0.02 10*3/MM3 (ref 0–0.2)
BASOPHILS NFR BLD AUTO: 0.9 % (ref 0–1.5)
BILIRUB SERPL-MCNC: 0.7 MG/DL (ref 0–1.2)
BUN SERPL-MCNC: 24 MG/DL (ref 6–20)
BUN/CREAT SERPL: 27.9 (ref 7–25)
CALCIUM SPEC-SCNC: 9.6 MG/DL (ref 8.6–10.5)
CHLORIDE SERPL-SCNC: 99 MMOL/L (ref 98–107)
CO2 SERPL-SCNC: 26.1 MMOL/L (ref 22–29)
CREAT SERPL-MCNC: 0.86 MG/DL (ref 0.57–1)
DEPRECATED RDW RBC AUTO: 51.4 FL (ref 37–54)
EOSINOPHIL # BLD AUTO: 0 10*3/MM3 (ref 0–0.4)
EOSINOPHIL NFR BLD AUTO: 0 % (ref 0.3–6.2)
ERYTHROCYTE [DISTWIDTH] IN BLOOD BY AUTOMATED COUNT: 16.5 % (ref 12.3–15.4)
FERRITIN SERPL-MCNC: 43.4 NG/ML (ref 13–150)
GFR SERPL CREATININE-BSD FRML MDRD: 68 ML/MIN/1.73
GLOBULIN UR ELPH-MCNC: 2.8 GM/DL
GLUCOSE SERPL-MCNC: 583 MG/DL (ref 65–99)
HCT VFR BLD AUTO: 27.7 % (ref 34–46.6)
HGB BLD-MCNC: 9.5 G/DL (ref 12–15.9)
HGB RETIC QN AUTO: 31.7 PG (ref 29.8–36.1)
IMM GRANULOCYTES # BLD AUTO: 0.04 10*3/MM3 (ref 0–0.05)
IMM GRANULOCYTES NFR BLD AUTO: 1.7 % (ref 0–0.5)
IMM RETICS NFR: 15.7 % (ref 3–15.8)
IRON 24H UR-MRATE: 65 MCG/DL (ref 37–145)
IRON SATN MFR SERPL: 13 % (ref 20–50)
LYMPHOCYTES # BLD AUTO: 0.49 10*3/MM3 (ref 0.7–3.1)
LYMPHOCYTES NFR BLD AUTO: 21.3 % (ref 19.6–45.3)
MCH RBC QN AUTO: 29.6 PG (ref 26.6–33)
MCHC RBC AUTO-ENTMCNC: 34.3 G/DL (ref 31.5–35.7)
MCV RBC AUTO: 86.3 FL (ref 79–97)
MONOCYTES # BLD AUTO: 0.31 10*3/MM3 (ref 0.1–0.9)
MONOCYTES NFR BLD AUTO: 13.5 % (ref 5–12)
NEUTROPHILS NFR BLD AUTO: 1.44 10*3/MM3 (ref 1.7–7)
NEUTROPHILS NFR BLD AUTO: 62.6 % (ref 42.7–76)
NRBC BLD AUTO-RTO: 0 /100 WBC (ref 0–0.2)
PLAT MORPH BLD: NORMAL
PLATELET # BLD AUTO: 63 10*3/MM3 (ref 140–450)
PMV BLD AUTO: 11.1 FL (ref 6–12)
POLYCHROMASIA BLD QL SMEAR: NORMAL
POTASSIUM SERPL-SCNC: 4.1 MMOL/L (ref 3.5–5.2)
PROT SERPL-MCNC: 6.4 G/DL (ref 6–8.5)
RBC # BLD AUTO: 3.21 10*6/MM3 (ref 3.77–5.28)
RETICS # AUTO: 0.21 10*6/MM3 (ref 0.02–0.13)
RETICS/RBC NFR AUTO: 6.24 % (ref 0.7–1.9)
SODIUM SERPL-SCNC: 131 MMOL/L (ref 136–145)
TIBC SERPL-MCNC: 493 MCG/DL (ref 298–536)
TRANSFERRIN SERPL-MCNC: 331 MG/DL (ref 200–360)
VIT B12 BLD-MCNC: >2000 PG/ML (ref 211–946)
WBC # BLD AUTO: 2.3 10*3/MM3 (ref 3.4–10.8)
WBC MORPH BLD: NORMAL

## 2020-12-04 PROCEDURE — 85007 BL SMEAR W/DIFF WBC COUNT: CPT | Performed by: INTERNAL MEDICINE

## 2020-12-04 PROCEDURE — 85025 COMPLETE CBC W/AUTO DIFF WBC: CPT | Performed by: INTERNAL MEDICINE

## 2020-12-04 PROCEDURE — 80053 COMPREHEN METABOLIC PANEL: CPT | Performed by: INTERNAL MEDICINE

## 2020-12-04 PROCEDURE — 83540 ASSAY OF IRON: CPT | Performed by: INTERNAL MEDICINE

## 2020-12-04 PROCEDURE — 82728 ASSAY OF FERRITIN: CPT | Performed by: INTERNAL MEDICINE

## 2020-12-04 PROCEDURE — 82607 VITAMIN B-12: CPT | Performed by: INTERNAL MEDICINE

## 2020-12-04 PROCEDURE — 36415 COLL VENOUS BLD VENIPUNCTURE: CPT

## 2020-12-04 PROCEDURE — 85046 RETICYTE/HGB CONCENTRATE: CPT | Performed by: INTERNAL MEDICINE

## 2020-12-04 PROCEDURE — 84466 ASSAY OF TRANSFERRIN: CPT | Performed by: INTERNAL MEDICINE

## 2020-12-04 PROCEDURE — 99214 OFFICE O/P EST MOD 30 MIN: CPT | Performed by: INTERNAL MEDICINE

## 2020-12-04 RX ORDER — FERROUS SULFATE 325(65) MG
TABLET ORAL
COMMUNITY
Start: 2019-06-28 | End: 2020-12-10

## 2020-12-04 NOTE — PROGRESS NOTES
REASON FOR FOLLOWUP:    1. 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   2. Evidence of B12 deficiency on her initial lab evaluation in April 2013.  Patient was started on parenteral B12 replacement.  The patient was switched from shots to daily oral B12 replacement after the visit of 12/02/2013.    3. Hemoglobin has significantly improved since banding of her esophageal varices and increase in her oral iron supplement to twice daily dosing.  4. Morphologically normal bone marrow from 08/20/2013 with normal flow cytometry.  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  Significance of this is uncertain.    5. Mixed connective tissue disorder, currently on Enbrel therapy.  6. Insulin-requiring diabetes mellitus.  7. Patient continues to follow-up periodically with the transplant team at [facility] but currently she is not on the transplant list.  8. 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 Rituxan therapy weekly ×4 completed 7/16/2018     HISTORY OF PRESENT ILLNESS:  Silvia is a 60 y.o. 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  She has had GI bleeding in the past due to portal hypertension.      She also developed acute ITP and received Rituxan weekly ×4.  She completed her Rituxan treatment on 7/16/2018.  She had a good response.  Her platelet count did not correct to normal but did return to her baseline platelet count of around 60,000-90,000.    She also had received 2 doses of IV iron therapy in the form of Injectafer 750 mg each dose delivered on 1/29/2019 and 2/13/2019.      Silvia again required hospitalization at [facility] from 5/28/2019 to 5/30/2019 due to symptomatic edema requiring diuretic therapy.  She also had been hospitalized at [facility] from 5/9/2019 to 5/14/2019 and on that admission she  "underwent an esophageal varices banding procedure on 5/10/2019.    She received additional IV Injectafer on 6/26/2019 and 7/3/2019.    She has been in [place] taking care of her dying father for several months and has not been seen in our office since July 2019.  Her father has since passed away and she returns for reevaluation.    She tells me that while in [place] she also was very ill due to her cirrhosis and portal hypertension.  She had bleeding esophageal varices on several occasions requiring admission and transfusions.  She ended up undergoing a TI PS procedure.    Blood count today shows a hemoglobin of 9.5 and platelet count of 63,000.  White count is 2300.      Past Medical History, Past Surgical History, Social History, Family History have been reviewed and are without significant changes except as mentioned.      Review of Systems   Constitutional: Positive for fatigue and fever. Negative for activity change.   HENT: Negative for mouth sores, nosebleeds and trouble swallowing.    Respiratory: Negative for cough, shortness of breath and wheezing.    Cardiovascular: Negative for chest pain and palpitations.   Gastrointestinal: Positive for constipation. Negative for diarrhea and nausea.   Genitourinary: Negative for dysuria and hematuria.   Musculoskeletal: Negative for arthralgias and myalgias.        Muscle cramps   Skin: Negative for rash and wound.   Neurological: Negative for seizures and syncope.   Hematological: Negative for adenopathy. Bruises/bleeds easily.   Psychiatric/Behavioral: Positive for dysphoric mood. Negative for confusion.       Medications:  The current medication list was reviewed in the EMR    ALLERGIES:    Allergies   Allergen Reactions   • Albuterol Anaphylaxis   • Tramadol Nausea Only, Other (See Comments) and GI Intolerance     Per pt causes her \"palsy, meaning shaking and tremors\"    • Lactulose Nausea And Vomiting and Other (See Comments)     Severe abdominal pain   • Quinine " "Derivatives Nausea And Vomiting              Vitals:    12/04/20 1308   BP: 152/80   Pulse: 90   Resp: 16   Temp: 98 °F (36.7 °C)   TempSrc: Temporal   SpO2: 98%   Weight: 82.9 kg (182 lb 11.2 oz)   Height: 171.5 cm (67.52\")   PainSc: 0-No pain     Physical Exam    CONSTITUTIONAL:  Vital signs reviewed.  No distress, looks comfortable.  EYES:  Conjunctiva and lids unremarkable.  PERRLA  EARS,NOSE,MOUTH,THROAT:  Ears and nose appear unremarkable.  Lips, teeth, gums appear unremarkable.  RESPIRATORY:  Normal respiratory effort.  Lungs clear to auscultation bilaterally.  CARDIOVASCULAR:  Normal S1, S2.  No murmurs rubs or gallops.  No significant lower extremity edema.  GASTROINTESTINAL: Abdomen appears distended with positive fluid wave   MUSCULOSKELETAL:  Unremarkable digits/nails.  No cyanosis or clubbing.  SKIN:  Warm.  No rashes.  PSYCHIATRIC: She seems depressed.  She is on Xifaxan for hepatic encephalopathy.       RECENT LABS:  WBC   Date Value Ref Range Status   12/04/2020 2.30 (L) 3.40 - 10.80 10*3/mm3 Final     Hemoglobin   Date Value Ref Range Status   12/04/2020 9.5 (L) 12.0 - 15.9 g/dL Final     Platelets   Date Value Ref Range Status   12/04/2020 63 (L) 140 - 450 10*3/mm3 Final     Lab Results   Component Value Date    NEUTROABS 1.44 (L) 12/04/2020                 ASSESSMENT/PLAN:     *ITP.  Peripheral smear showed no significant amount of schistocytes and no platelet clumping.  IPF improved to 5.3% after Rituxan.  She received 5 daily doses of IVIG in the hospital and was discharged on prednisone 60 mg daily.   Her platelets now seem to be back at her baseline in the 50,000 100,000 range from hypersplenism.    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     *B12 deficiency.  On oral B12 since December 2013.     *History of esophageal varices banding.  She since has undergone a TI PS procedure     *Iron deficiency anemia due to GI bleeding.  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  This is of unknown significance.     *Rheumatoid arthritis.       *Lupus     *Diabetes.  Hyperglycemia from steroids may be a problem.       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          Plan  1.  She will undergo additional labs today including serum chemistries and iron panel ferritin reticulocyte count and B12 level.  2.  We will be referring her to Dr. Perera of gastroenterology as her previous GI doctor Dr. Coleman has retired.  3.  We will review the iron studies when they become available and if she is iron deficient we can contact her to schedule IV Injectafer infusions.  4.  Otherwise, we will plan to see her back in our office in 6 weeks for routine follow-up with repeat labs at that time.

## 2020-12-10 ENCOUNTER — TELEPHONE (OUTPATIENT)
Dept: GASTROENTEROLOGY | Facility: CLINIC | Age: 60
End: 2020-12-10

## 2020-12-10 ENCOUNTER — OFFICE VISIT (OUTPATIENT)
Dept: GASTROENTEROLOGY | Facility: CLINIC | Age: 60
End: 2020-12-10

## 2020-12-10 VITALS — BODY MASS INDEX: 27.17 KG/M2 | HEIGHT: 68 IN | WEIGHT: 179.3 LBS

## 2020-12-10 DIAGNOSIS — Z87.19 HISTORY OF ESOPHAGEAL VARICES: ICD-10-CM

## 2020-12-10 DIAGNOSIS — K74.69 OTHER CIRRHOSIS OF LIVER (HCC): Primary | ICD-10-CM

## 2020-12-10 DIAGNOSIS — K31.84 GASTROPARESIS: ICD-10-CM

## 2020-12-10 DIAGNOSIS — Z95.828 S/P TIPS (TRANSJUGULAR INTRAHEPATIC PORTOSYSTEMIC SHUNT): ICD-10-CM

## 2020-12-10 PROCEDURE — 99204 OFFICE O/P NEW MOD 45 MIN: CPT | Performed by: INTERNAL MEDICINE

## 2020-12-10 RX ORDER — OXYCODONE HYDROCHLORIDE 7.5 MG/1
TABLET ORAL EVERY 8 HOURS SCHEDULED
COMMUNITY
End: 2020-12-10

## 2020-12-10 RX ORDER — CALCIUM CARBONATE/VITAMIN D3 500-10/5ML
400 LIQUID (ML) ORAL NIGHTLY
COMMUNITY
End: 2021-02-12 | Stop reason: SDUPTHER

## 2020-12-10 RX ORDER — SUCRALFATE ORAL 1 G/10ML
1 SUSPENSION ORAL 3 TIMES DAILY
COMMUNITY
End: 2021-09-20

## 2020-12-10 RX ORDER — METOCLOPRAMIDE 5 MG/1
5 TABLET ORAL
COMMUNITY
End: 2021-03-17 | Stop reason: SDUPTHER

## 2020-12-10 RX ORDER — MIRTAZAPINE 15 MG/1
30 TABLET, FILM COATED ORAL NIGHTLY
COMMUNITY
End: 2022-02-08

## 2020-12-10 RX ORDER — ESOMEPRAZOLE MAGNESIUM 40 MG/1
40 CAPSULE, DELAYED RELEASE ORAL
COMMUNITY
End: 2021-03-26 | Stop reason: SDUPTHER

## 2020-12-10 RX ORDER — HYDROXYZINE HYDROCHLORIDE 25 MG/1
50 TABLET, FILM COATED ORAL NIGHTLY
Qty: 30 TABLET | Refills: 3 | Status: SHIPPED | OUTPATIENT
Start: 2020-12-10 | End: 2021-03-17 | Stop reason: SDUPTHER

## 2020-12-10 RX ORDER — METAXALONE 800 MG/1
800 TABLET ORAL 3 TIMES DAILY
Status: ON HOLD | COMMUNITY
End: 2021-02-07 | Stop reason: SDUPTHER

## 2020-12-10 RX ORDER — LANOLIN ALCOHOL/MO/W.PET/CERES
1000 CREAM (GRAM) TOPICAL DAILY
COMMUNITY

## 2020-12-10 NOTE — TELEPHONE ENCOUNTER
----- Message from Kimberly Gray MD sent at 12/10/2020 10:00 AM EST -----  Please request procedure report from TIPS procedure revision that was performed in July 2020 at [facility] in [place]

## 2020-12-10 NOTE — PROGRESS NOTES
Subjective   Chief Complaint   Patient presents with   • Cirrhosis   • Gastroparesis       Silvia Zabala is a  60 y.o. female here for an initial visit for gastroparesis and cirrhosis.  Patient is a former patient of Dr. Rich Coleman.  Patient has a history of Wong cirrhosis.    Patient had an upper GI bleed secondary to esophageal varices in May 2019.  She was banded at that time and had subsequent repeat EGD in June 2019 with additional banding.  She has not had an EGD since.    She had TIPs with revision at [facility] in [place].  She had GI bleeding while she was in [place].  She has a history of ascites - she has not had a paracentesis since her TIPs.      She reports that she initially felt better.  She has been more nauseated lately.  She has had some cramping.  She does report that she feels poorly this week because she was out of Lantus for 2 weeks prior.    She has ITP and has had bleeding from her gums and nosebleeds.  She has had melena recently but she had a nosebleed.      She complains of ruq pain radiating to her back.      She is followed by Dr Montes at  as well.  She does not see him again until June.      She has rcvd her flu shot.      She has aoout 4 BM a day.     She has been throwing up more - most recent BS > 500.  She takes this tid-reglan.      Her mother had WONG.    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   • Cirrhosis (CMS/HCC)    • Colon polyps    • Deafness    • Depression    • Diabetes mellitus (CMS/HCC)     Adult onset, insulin requiring   • Duodenal ulcer with hemorrhage    • Esophageal varices determined by endoscopy (CMS/HCC)    • Fibromyalgia    • Gallbladder disease    • Gastric varices    • Gastroparesis    • Glaucoma    • Granuloma  annulare    • H/O B12 deficiency    • H/O Bleeding ulcer     And gastroesophageal varices   • H/O mixed connective tissue disorder    • Hemorrhoids    • Hiatal hernia    • History of transfusion    • Hx of being hospitalized     In [place] for GI bleeding from ulcer and varices   • Hyperlipidemia    • Migraine    • DUKES (nonalcoholic steatohepatitis) 11/2016   • Orthostatic hypotension 02/2019   • BRICE (obstructive sleep apnea)    • Pancreas divisum    • Pancytopenia (CMS/HCC)     Chronic, due to cirrhosis and hypersplenism   • Peptic ulcer disease     And esophageal varices   • PONV (postoperative nausea and vomiting)    • RA (rheumatoid arthritis) (CMS/HCC)    • Seizure disorder (CMS/HCC)     LAST ONE SEVERAL YEARS AGO   • Seizures (CMS/HCC)    • Systemic lupus (CMS/HCC)      Past Surgical History:   Procedure Laterality Date   • BLADDER REPAIR  1991   • CATARACT EXTRACTION     • CHOLECYSTECTOMY  1994   • ENDOSCOPY N/A 11/7/2016    Procedure: ESOPHAGOGASTRODUODENOSCOPY WITH COLD POLYPECTOMY, BANDING,  AND CLIPS TIMES 2;  Surgeon: Rich Coleman MD;  Location: [facility] ENDOSCOPY;  Service:    • ENDOSCOPY N/A 12/22/2016    Procedure: ESOPHAGOGASTRODUODENOSCOPY WITH ESOPHAGEAL BANDING. 5 BANDS FIRED, 4 BANDS ADHERERD TO MUCOSA;  Surgeon: Rich Coleman MD;  Location: [facility] ENDOSCOPY;  Service:    • ENDOSCOPY N/A 2/15/2017    Procedure: ESOPHAGOGASTRODUODENOSCOPY AT BEDSIDE with esophageal varices banding (3);  Surgeon: Rich Coleman MD;  Location: [facility] ENDOSCOPY;  Service:    • ENDOSCOPY N/A 4/6/2017    Procedure: ESOPHAGOGASTRODUODENOSCOPY WITH ESOPHAGEAL VARICES BANDING x2;  Surgeon: Rich Coleman MD;  Location: [facility] ENDOSCOPY;  Service:    • ENDOSCOPY N/A 11/24/2017    Procedure: ESOPHAGOGASTRODUODENOSCOPY with biopsies;  Surgeon: Rich Coleman MD;  Location: [facility] ENDOSCOPY;  Service:    • ENDOSCOPY N/A 10/5/2018    Procedure: ESOPHAGOGASTRODUODENOSCOPY;  Surgeon: Rich Coleman MD;   "Location: [facility] ENDOSCOPY;  Service: Gastroenterology   • ENDOSCOPY N/A 5/10/2019    Procedure: ESOPHAGOGASTRODUODENOSCOPY AT BEDSIDE WITH VARICEAL LIGATION;  Surgeon: Rich Coleman MD;  Location: [facility] ENDOSCOPY;  Service: Gastroenterology   • ENDOSCOPY N/A 6/28/2019    Procedure: ESOPHAGOGASTRODUODENOSCOPY WITH ESOPHAGEAL BANDING (3 BANDS);  Surgeon: Rich Coleman MD;  Location: [facility] ENDOSCOPY;  Service: Gastroenterology   • ENDOSCOPY AND COLONOSCOPY  2014    Dr. Rich Coleman with findings of candida esophagitis   • EYE SURGERY     • HYSTERECTOMY  1986    partial   • JOINT REPLACEMENT     • KNEE SURGERY Right 1995    \"right knee recontstruction\"   • LIVER BIOPSY  01/2015   • MASS EXCISION     • STOMACH SURGERY         Current Outpatient Medications:   •  ALPRAZolam (XANAX) 0.5 MG tablet, Take 1 tablet by mouth 2 (Two) Times a Day As Needed for Anxiety. (Patient taking differently: Take 0.5 mg by mouth Daily.), Disp: 60 tablet, Rfl: 1  •  Cholecalciferol (Vitamin D3) 25 MCG (1000 UT) capsule, Vitamin D3 25 mcg (1,000 unit) capsule  Take by oral route., Disp: , Rfl:   •  cholestyramine light 4 g powder, cholestyramine (with sugar) 4 gram powder for susp in a packet, Disp: , Rfl:   •  Continuous Blood Gluc Sensor (FREESTYLE YESIKA SENSOR SYSTEM), 1 Device Every 14 (Fourteen) Days., Disp: 2 each, Rfl: 3  •  Docusate Sodium (COLACE PO), Take  by mouth every night at bedtime., Disp: , Rfl:   •  esomeprazole (nexIUM) 40 MG capsule, esomeprazole magnesium 40 mg capsule,delayed release, Disp: , Rfl:   •  ferrous sulfate 325 (65 FE) MG tablet, TAKE ONE TABLET BY MOUTH TWICE A DAY, Disp: 60 tablet, Rfl: 2  •  furosemide (LASIX) 80 MG tablet, Take 80 mg by mouth Daily., Disp: , Rfl:   •  glucose blood test strip, OneTouch Ultra Blue Test Strip, Disp: , Rfl:   •  hydrOXYzine (ATARAX) 25 MG tablet, Take 2 tablets by mouth Every Night., Disp: 30 tablet, Rfl: 3  •  Insulin Glargine (LANTUS SOLOSTAR) 100 UNIT/ML " injection pen, Inject 70 units in the am and 70 units in the pm (Patient taking differently: 80 Units. Inject 80 units in the am and 80 units in the pm), Disp: 20 pen, Rfl: 1  •  Insulin Lispro (HUMALOG KWIKPEN) 100 UNIT/ML solution pen-injector, INJECT UP TO 60 UNITS THREE TIMES A DAY WITH MEAL, Disp: 20 pen, Rfl: 1  •  levETIRAcetam (KEPPRA) 500 MG tablet, TAKE ONE TABLET BY MOUTH TWICE A DAY, Disp: 24 tablet, Rfl: 0  •  Magnesium Oxide 400 MG capsule, magnesium oxide 400 mg (241.3 mg magnesium) tablet, Disp: , Rfl:   •  metaxalone (SKELAXIN) 800 MG tablet, metaxalone 800 mg tablet, Disp: , Rfl:   •  metoclopramide (REGLAN) 5 MG tablet, Take 5 mg by mouth 4 (Four) Times a Day Before Meals & at Bedtime., Disp: , Rfl:   •  mirtazapine (REMERON) 15 MG tablet, mirtazapine 15 mg tablet, Disp: , Rfl:   •  Multiple Vitamins-Minerals (MULTIVITAMIN WITH MINERALS) tablet tablet, Take 1 tablet by mouth Daily., Disp: , Rfl:   •  OxyCODONE HCl (OXAYDO) 7.5 MG tablet , Take 7.5 mg by mouth Every 8 (Eight) Hours As Needed., Disp: , Rfl:   •  polyethylene glycol (MIRALAX) powder, Take 17 g by mouth Daily., Disp: , Rfl:   •  pregabalin (LYRICA) 75 MG capsule, Take 75 mg by mouth 2 (Two) Times a Day., Disp: , Rfl:   •  promethazine (PHENERGAN) 25 MG tablet, Take 1 tablet by mouth Every 6 (Six) Hours As Needed for Nausea or Vomiting., Disp: 30 tablet, Rfl: 0  •  spironolactone (ALDACTONE) 100 MG tablet, TAKE ONE TABLET BY MOUTH DAILY, Disp: 30 tablet, Rfl: 2  •  sucralfate (CARAFATE) 1 GM/10ML suspension, sucralfate 100 mg/mL oral suspension, Disp: , Rfl:   •  vitamin B-12 (CYANOCOBALAMIN) 1000 MCG tablet, Take 1,000 mcg by mouth daily., Disp: , Rfl:   •  vitamin B-12 (CYANOCOBALAMIN) 1000 MCG tablet, Take 1,000 mcg by mouth Daily., Disp: , Rfl:   •  XIFAXAN 550 MG tablet, TAKE ONE TABLET BY MOUTH TWICE A DAY, Disp: 60 tablet, Rfl: 2  •  estradiol (VAGIFEM) 10 MCG tablet vaginal tablet, Insert 1 tablet into the vagina 2 (Two) Times  "a Week., Disp: 8 tablet, Rfl: 11  •  OLANZapine (zyPREXA) 5 MG tablet, Take 5 mg by mouth Every Night., Disp: , Rfl:   PRN Meds:.  Allergies   Allergen Reactions   • Albuterol Anaphylaxis   • Imitrex [Sumatriptan] Anaphylaxis   • Tramadol Nausea Only, Other (See Comments) and GI Intolerance     Per pt causes her \"palsy, meaning shaking and tremors\"    • Lactulose Nausea And Vomiting and Other (See Comments)     Severe abdominal pain   • Quinine Derivatives Nausea And Vomiting     Social History     Socioeconomic History   • Marital status:      Spouse name: Jose   • Number of children: Not on file   • Years of education: Not on file   • Highest education level: Not on file   Occupational History     Employer: DISABLED   Tobacco Use   • Smoking status: Former Smoker     Packs/day: 0.00     Years: 0.00     Pack years: 0.00     Quit date: 2015     Years since quittin.9   • Smokeless tobacco: Never Used   Substance and Sexual Activity   • Alcohol use: No   • Drug use: No   • Sexual activity: Yes     Partners: Male   Social History Narrative    Moved to [place] from [place]. She is currently medically disabled.     Family History   Problem Relation Age of Onset   • Liver disease Other    • Depression Other    • Heart disease Other    • Hypertension Other    • Diabetes Other    • Breast cancer Other    • Brain cancer Other    • Uterine cancer Other    • Colon cancer Other    • Leukemia Other    • Colon cancer Maternal Aunt    • Hypertension Mother    • Diabetes type II Mother    • Rheum arthritis Mother    • Liver disease Mother         DUKES   • Heart disease Father    • Diabetes type II Father    • Diabetes type II Sister    • Lupus Sister    • Malig Hyperthermia Neg Hx      Review of Systems   Constitutional: Positive for appetite change and unexpected weight change.   HENT: Positive for nosebleeds.    Gastrointestinal: Positive for abdominal pain, nausea and vomiting.   Neurological: Positive for " weakness.   Hematological: Bruises/bleeds easily.   All other systems reviewed and are negative.    There were no vitals filed for this visit.      12/10/20  0921   Weight: 81.3 kg (179 lb 4.8 oz)       Objective   Physical Exam  Vitals signs and nursing note reviewed.   Constitutional:       Appearance: She is well-developed.   HENT:      Head: Normocephalic and atraumatic.   Eyes:      General: No scleral icterus.     Conjunctiva/sclera: Conjunctivae normal.   Neck:      Musculoskeletal: Normal range of motion and neck supple.   Pulmonary:      Effort: Pulmonary effort is normal.      Breath sounds: Normal breath sounds.   Abdominal:      General: Bowel sounds are normal. There is no distension.      Palpations: Abdomen is soft. There is no mass.      Tenderness: There is no abdominal tenderness.   Skin:     General: Skin is warm and dry.   Neurological:      Mental Status: She is alert and oriented to person, place, and time.       No radiology results for the last 7 days    Assessment/Plan   Diagnoses and all orders for this visit:    Other cirrhosis of liver (CMS/HCC)  -     AFP Tumor Marker; Future  -     US Liver; Future  -     Duplex Portal Hepatic Complete CAR; Future    S/P TIPS (transjugular intrahepatic portosystemic shunt)  -     US Liver; Future  -     Duplex Portal Hepatic Complete CAR; Future    History of esophageal varices    Gastroparesis    Other orders  -     esomeprazole (nexIUM) 40 MG capsule; esomeprazole magnesium 40 mg capsule,delayed release  -     cholestyramine light 4 g powder; cholestyramine (with sugar) 4 gram powder for susp in a packet  -     Magnesium Oxide 400 MG capsule; magnesium oxide 400 mg (241.3 mg magnesium) tablet  -     metaxalone (SKELAXIN) 800 MG tablet; metaxalone 800 mg tablet  -     mirtazapine (REMERON) 15 MG tablet; mirtazapine 15 mg tablet  -     sucralfate (CARAFATE) 1 GM/10ML suspension; sucralfate 100 mg/mL oral suspension  -     Discontinue: oxyCODONE HCl  (Oxaydo) 7.5 MG tablet; Every 8 (Eight) Hours.  -     metoclopramide (REGLAN) 5 MG tablet; Take 5 mg by mouth 4 (Four) Times a Day Before Meals & at Bedtime.  -     Docusate Sodium (COLACE PO); Take  by mouth every night at bedtime.  -     vitamin B-12 (CYANOCOBALAMIN) 1000 MCG tablet; Take 1,000 mcg by mouth Daily.  -     hydrOXYzine (ATARAX) 25 MG tablet; Take 2 tablets by mouth Every Night.      Plan:  · Continue cholestyramine - add atarax at night for itching  · Needs AFP next lab draw - recent labs reviewed  · Check tips patency - request records from TIPS revision in [place]  · Due for HCC surveillance-we will schedule ultrasound  · UTD on vaccines  · Gastroparesis is fairly stable-I am concerned with increasing her Reglan in the setting of her other multiple medications.  Recommend diet modification to decrease symptoms.  Continue Reglan 5 mg 3 times daily.  We discussed diet at length.

## 2020-12-11 ENCOUNTER — OFFICE VISIT (OUTPATIENT)
Dept: ORTHOPEDIC SURGERY | Facility: CLINIC | Age: 60
End: 2020-12-11

## 2020-12-11 VITALS — HEIGHT: 67 IN | BODY MASS INDEX: 28.11 KG/M2 | TEMPERATURE: 97.6 F | WEIGHT: 179.1 LBS

## 2020-12-11 DIAGNOSIS — Z00.00 HEALTH CARE MAINTENANCE: ICD-10-CM

## 2020-12-11 DIAGNOSIS — F32.89 OTHER DEPRESSION: ICD-10-CM

## 2020-12-11 DIAGNOSIS — M25.511 RIGHT SHOULDER PAIN, UNSPECIFIED CHRONICITY: Primary | ICD-10-CM

## 2020-12-11 DIAGNOSIS — M25.512 LEFT SHOULDER PAIN, UNSPECIFIED CHRONICITY: ICD-10-CM

## 2020-12-11 PROCEDURE — 99214 OFFICE O/P EST MOD 30 MIN: CPT | Performed by: ORTHOPAEDIC SURGERY

## 2020-12-11 PROCEDURE — 73030 X-RAY EXAM OF SHOULDER: CPT | Performed by: ORTHOPAEDIC SURGERY

## 2020-12-11 NOTE — PROGRESS NOTES
"   New Shoulder      Patient: Silvia Zabala        YOB: 1960    Medical Record Number: [id number]        Chief Complaints: Left shoulder pain      History of Present Illness: This is a 60-year-old female who I saw about a year and a half ago for left shoulder she was felt to have some degenerative changes and evidence of a proximal humerus fracture that it healed she had no treatment for that however.  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  She did have a recent stent placed in her liver and her numbers are actually improving.  Her shoulder started bothering her several weeks ago no no history injury change in activities she can recall symptoms are moderate to severe intermittent constant depending on the day worse with activity somewhat better with rest her past medical history is quite complicated listed below and reviewed by me      Allergies:   Allergies   Allergen Reactions   • Albuterol Anaphylaxis   • Imitrex [Sumatriptan] Anaphylaxis   • Tramadol Nausea Only, Other (See Comments) and GI Intolerance     Per pt causes her \"palsy, meaning shaking and tremors\"    • Lactulose Nausea And Vomiting and Other (See Comments)     Severe abdominal pain   • Quinine Derivatives Nausea And Vomiting       Medications:   Home Medications:  Current Outpatient Medications on File Prior to Visit   Medication Sig   • ALPRAZolam (XANAX) 0.5 MG tablet Take 1 tablet by mouth 2 (Two) Times a Day As Needed for Anxiety. (Patient taking differently: Take 0.5 mg by mouth Daily.)   • Cholecalciferol (Vitamin D3) 25 MCG (1000 UT) capsule Vitamin D3 25 mcg (1,000 unit) capsule   Take by oral route.   • cholestyramine light 4 g powder cholestyramine (with sugar) 4 gram powder for susp in a packet   • Continuous Blood Gluc " Sensor (FREESTYLE YESIKA SENSOR SYSTEM) 1 Device Every 14 (Fourteen) Days.   • Docusate Sodium (COLACE PO) Take  by mouth every night at bedtime.   • esomeprazole (nexIUM) 40 MG capsule esomeprazole magnesium 40 mg capsule,delayed release   • estradiol (VAGIFEM) 10 MCG tablet vaginal tablet Insert 1 tablet into the vagina 2 (Two) Times a Week.   • ferrous sulfate 325 (65 FE) MG tablet TAKE ONE TABLET BY MOUTH TWICE A DAY   • furosemide (LASIX) 80 MG tablet Take 80 mg by mouth Daily.   • glucose blood test strip OneTouch Ultra Blue Test Strip   • hydrOXYzine (ATARAX) 25 MG tablet Take 2 tablets by mouth Every Night.   • Insulin Glargine (LANTUS SOLOSTAR) 100 UNIT/ML injection pen Inject 70 units in the am and 70 units in the pm (Patient taking differently: 80 Units. Inject 80 units in the am and 80 units in the pm)   • Insulin Lispro (HUMALOG KWIKPEN) 100 UNIT/ML solution pen-injector INJECT UP TO 60 UNITS THREE TIMES A DAY WITH MEAL   • levETIRAcetam (KEPPRA) 500 MG tablet TAKE ONE TABLET BY MOUTH TWICE A DAY   • Magnesium Oxide 400 MG capsule magnesium oxide 400 mg (241.3 mg magnesium) tablet   • metaxalone (SKELAXIN) 800 MG tablet metaxalone 800 mg tablet   • metoclopramide (REGLAN) 5 MG tablet Take 5 mg by mouth 4 (Four) Times a Day Before Meals & at Bedtime.   • mirtazapine (REMERON) 15 MG tablet mirtazapine 15 mg tablet   • Multiple Vitamins-Minerals (MULTIVITAMIN WITH MINERALS) tablet tablet Take 1 tablet by mouth Daily.   • OLANZapine (zyPREXA) 5 MG tablet Take 5 mg by mouth Every Night.   • OxyCODONE HCl (OXAYDO) 7.5 MG tablet  Take 7.5 mg by mouth Every 8 (Eight) Hours As Needed.   • polyethylene glycol (MIRALAX) powder Take 17 g by mouth Daily.   • pregabalin (LYRICA) 75 MG capsule Take 75 mg by mouth 2 (Two) Times a Day.   • promethazine (PHENERGAN) 25 MG tablet Take 1 tablet by mouth Every 6 (Six) Hours As Needed for Nausea or Vomiting.   • spironolactone (ALDACTONE) 100 MG tablet TAKE ONE TABLET BY MOUTH  DAILY   • sucralfate (CARAFATE) 1 GM/10ML suspension sucralfate 100 mg/mL oral suspension   • vitamin B-12 (CYANOCOBALAMIN) 1000 MCG tablet Take 1,000 mcg by mouth daily.   • vitamin B-12 (CYANOCOBALAMIN) 1000 MCG tablet Take 1,000 mcg by mouth Daily.   • XIFAXAN 550 MG tablet TAKE ONE TABLET BY MOUTH TWICE A DAY     No current facility-administered medications on file prior to visit.      Current Medications:  Scheduled Meds:  Continuous Infusions:No current facility-administered medications for this visit.     PRN Meds:.    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   • Cirrhosis (CMS/HCC)    • Colon polyps    • Deafness    • Depression    • Diabetes mellitus (CMS/HCC)     Adult onset, insulin requiring   • Duodenal ulcer with hemorrhage    • Esophageal varices determined by endoscopy (CMS/HCC)    • Fibromyalgia    • Gallbladder disease    • Gastric varices    • Gastroparesis    • Glaucoma    • Granuloma annulare    • H/O B12 deficiency    • H/O Bleeding ulcer     And gastroesophageal varices   • H/O mixed connective tissue disorder    • Hemorrhoids    • Hiatal hernia    • History of transfusion    • Hx of being hospitalized     In [place] for GI bleeding from ulcer and varices   • Hyperlipidemia    • Migraine    • DUKES (nonalcoholic steatohepatitis) 11/2016   • Orthostatic hypotension 02/2019   • BRICE (obstructive sleep apnea)    • Pancreas divisum    • Pancytopenia (CMS/HCC)     Chronic, due to cirrhosis and hypersplenism   • Peptic ulcer disease     And esophageal varices   • PONV (postoperative nausea and vomiting)    • RA (rheumatoid arthritis) (CMS/HCC)    • Seizure disorder (CMS/HCC)     LAST ONE SEVERAL YEARS AGO   • Seizures (CMS/HCC)    • Systemic lupus (CMS/HCC)         Past Surgical  "History:   Procedure Laterality Date   • BLADDER REPAIR  1991   • CATARACT EXTRACTION     • CHOLECYSTECTOMY  1994   • ENDOSCOPY N/A 11/7/2016    Procedure: ESOPHAGOGASTRODUODENOSCOPY WITH COLD POLYPECTOMY, BANDING,  AND CLIPS TIMES 2;  Surgeon: Rich Coleman MD;  Location: [facility] ENDOSCOPY;  Service:    • ENDOSCOPY N/A 12/22/2016    Procedure: ESOPHAGOGASTRODUODENOSCOPY WITH ESOPHAGEAL BANDING. 5 BANDS FIRED, 4 BANDS ADHERERD TO MUCOSA;  Surgeon: Rich Coleman MD;  Location: [facility] ENDOSCOPY;  Service:    • ENDOSCOPY N/A 2/15/2017    Procedure: ESOPHAGOGASTRODUODENOSCOPY AT BEDSIDE with esophageal varices banding (3);  Surgeon: Rich Coleman MD;  Location: [facility] ENDOSCOPY;  Service:    • ENDOSCOPY N/A 4/6/2017    Procedure: ESOPHAGOGASTRODUODENOSCOPY WITH ESOPHAGEAL VARICES BANDING x2;  Surgeon: Rich Coleman MD;  Location: [facility] ENDOSCOPY;  Service:    • ENDOSCOPY N/A 11/24/2017    Procedure: ESOPHAGOGASTRODUODENOSCOPY with biopsies;  Surgeon: Rich Coleman MD;  Location: [facility] ENDOSCOPY;  Service:    • ENDOSCOPY N/A 10/5/2018    Procedure: ESOPHAGOGASTRODUODENOSCOPY;  Surgeon: Rich Coleman MD;  Location: [facility] ENDOSCOPY;  Service: Gastroenterology   • ENDOSCOPY N/A 5/10/2019    Procedure: ESOPHAGOGASTRODUODENOSCOPY AT BEDSIDE WITH VARICEAL LIGATION;  Surgeon: Rich Colemna MD;  Location: [facility] ENDOSCOPY;  Service: Gastroenterology   • ENDOSCOPY N/A 6/28/2019    Procedure: ESOPHAGOGASTRODUODENOSCOPY WITH ESOPHAGEAL BANDING (3 BANDS);  Surgeon: Rich Coleman MD;  Location: [facility] ENDOSCOPY;  Service: Gastroenterology   • ENDOSCOPY AND COLONOSCOPY  2014    Dr. Rich Coleman with findings of candida esophagitis   • EYE SURGERY     • HYSTERECTOMY  1986    partial   • JOINT REPLACEMENT     • KNEE SURGERY Right 1995    \"right knee recontstruction\"   • LIVER BIOPSY  01/2015   • MASS EXCISION     • STOMACH SURGERY          Social History     Occupational History     Employer: DISABLED " "  Tobacco Use   • Smoking status: Former Smoker     Packs/day: 0.00     Years: 0.00     Pack years: 0.00     Quit date: 2015     Years since quittin.9   • Smokeless tobacco: Never Used   Substance and Sexual Activity   • Alcohol use: No   • Drug use: No   • Sexual activity: Yes     Partners: Male      Social History     Social History Narrative    Moved to [place] from [place]. She is currently medically disabled.        Family History   Problem Relation Age of Onset   • Liver disease Other    • Depression Other    • Heart disease Other    • Hypertension Other    • Diabetes Other    • Breast cancer Other    • Brain cancer Other    • Uterine cancer Other    • Colon cancer Other    • Leukemia Other    • Colon cancer Maternal Aunt    • Hypertension Mother    • Diabetes type II Mother    • Rheum arthritis Mother    • Liver disease Mother         DUKES   • Heart disease Father    • Diabetes type II Father    • Diabetes type II Sister    • Lupus Sister    • Malig Hyperthermia Neg Hx              Review of Systems: 14 point review of systems are remarkable for the multiple pertinent positives that are listed by the patient the remainder negative she did check on her suicidal ideas I did talk to the patient about this she states it is real her liver doctor is aware of this and they are working to get her into psychiatry.  I will going to make referral to psychiatry as well she states she does not have a primary care doctor so she is asking for referral to that as well    Review of Systems      Physical Exam: 60 y.o. female  General Appearance:    Alert, cooperative, in no acute distress                   Vitals:    20 1040   Temp: 97.6 °F (36.4 °C)   TempSrc: Temporal   Weight: 81.2 kg (179 lb 1.6 oz)   Height: 170.2 cm (67\")      Patient is alert and read ×3 no acute distress appears her above-listed at height weight and age.  Affect is normal respiratory rate is normal unlabored. Heart rate regular rate " rhythm, sclera, dentition and hearing are normal for the purpose of this exam.    Ortho Exam Physical exam the left shoulder reveals no overlying skin changes no lymphedema lymphadenopathy the patient can actively flex to about 150 passively I get them to 160 abduction is similar external rotation is 40 internal rotation to there buttock.  Rotator cuff strength is 4+ over 5 with isometric strength testing no overlying skin changes.  Patient has reasonable cervical range of motion for their age no radicular symptoms and a normal elbow exam.  There are good distal pulses.    Procedures          Radiology:   AP, Scapular Y and Axillary Lateral of the left shoulder were ordered/reviewed to evauate shoulder pain.  I have compared x-rays from April of last year she has evidence of prior fracture with some rotation of the humeral head with a heads well-seated within the glenoid no acute pathology  Imaging Results (Most Recent)     Procedure Component Value Units Date/Time    XR Shoulder 2+ View Left [[id number]] Resulted: 12/11/20 1005     Updated: 12/11/20 1022    Impression:      Ordering physician's impression is located in the Encounter Note dated 12/11/20. X-ray performed in the DR room.          Assessment/Plan: Left shoulder pain I suspect there is some capsulitis and some degenerative changes involve she did great with a glenohumeral injection in the past we will try that again today I did talk to her again about the suicidal thoughts.  She states she is fine right now her doctor is aware she would like a referral to her primary care as well as a psychiatrist which I will do  Cortisone Injection. See procedure note.  Cortisone Injection for DIAGNOSTIC and THERAPUTIC purposes.

## 2020-12-11 NOTE — PROGRESS NOTES
Right Shoulder Follow Up      Patient: Silvia Zabala        YOB: 1960            Chief Complaints: right shoulder pain      History of Present Illness:      Physical Exam: 60 y.o. female  General Appearance:    Alert, cooperative, in no acute distress                 There were no vitals filed for this visit.     Patient is alert and read ×3 no acute distress appears her above-listed at height weight and age.  Affect is normal respiratory rate is normal unlabored. Heart rate regular rate rhythm, sclera, dentition and hearing are normal for the purpose of this exam.      Ortho Exam              Assessment/Plan:            Large Joint Arthrocentesis: L glenohumeral  Date/Time: 12/11/2020 11:07 AM  Consent given by: patient  Site marked: site marked  Timeout: Immediately prior to procedure a time out was called to verify the correct patient, procedure, equipment, support staff and site/side marked as required   Supporting Documentation  Indications: pain   Procedure Details  Location: shoulder - L glenohumeral  Preparation: Patient was prepped and draped in the usual sterile fashion  Needle gauge: 21G.  Approach: anterior  Medications administered: 4 mL lidocaine (cardiac); 80 mg methylPREDNISolone acetate 80 MG/ML  Patient tolerance: patient tolerated the procedure well with no immediate complications

## 2020-12-14 NOTE — TELEPHONE ENCOUNTER
Call to [facility], [place], operated by [facility] states to allow 10 days for processing

## 2020-12-15 ENCOUNTER — RESULTS ENCOUNTER (OUTPATIENT)
Dept: GASTROENTEROLOGY | Facility: CLINIC | Age: 60
End: 2020-12-15

## 2020-12-15 DIAGNOSIS — K74.69 OTHER CIRRHOSIS OF LIVER (HCC): ICD-10-CM

## 2020-12-22 ENCOUNTER — TELEPHONE (OUTPATIENT)
Dept: GASTROENTEROLOGY | Facility: CLINIC | Age: 60
End: 2020-12-22

## 2020-12-22 RX ORDER — LEVETIRACETAM 500 MG/1
TABLET ORAL
Qty: 24 TABLET | Refills: 0 | OUTPATIENT
Start: 2020-12-22

## 2020-12-22 NOTE — TELEPHONE ENCOUNTER
----- Message from Barbara Sher sent at 12/22/2020 11:14 AM EST -----  Contact: [phone]  Please call patient regarding her prescription.

## 2021-01-07 ENCOUNTER — TELEPHONE (OUTPATIENT)
Dept: ORTHOPEDIC SURGERY | Facility: CLINIC | Age: 61
End: 2021-01-07

## 2021-01-07 NOTE — TELEPHONE ENCOUNTER
Caller: MR. LUTHER    Relationship to patient:      Best call back number: [phone]    Type of visit: INJECTION    Requested date: N/A     If rescheduling, when is the original appointment: 01/08/21     Additional notes:PT'S  CALLED ADVISING PT IS SICK AND IS CX, BUT WILL CB TO RESCHEDULE.

## 2021-01-08 ENCOUNTER — APPOINTMENT (OUTPATIENT)
Dept: ULTRASOUND IMAGING | Facility: HOSPITAL | Age: 61
End: 2021-01-08

## 2021-01-08 ENCOUNTER — APPOINTMENT (OUTPATIENT)
Dept: CARDIOLOGY | Facility: HOSPITAL | Age: 61
End: 2021-01-08

## 2021-01-15 ENCOUNTER — APPOINTMENT (OUTPATIENT)
Dept: PREADMISSION TESTING | Facility: HOSPITAL | Age: 61
End: 2021-01-15

## 2021-01-15 ENCOUNTER — OFFICE VISIT (OUTPATIENT)
Dept: ORTHOPEDIC SURGERY | Facility: CLINIC | Age: 61
End: 2021-01-15

## 2021-01-15 ENCOUNTER — LAB (OUTPATIENT)
Dept: OTHER | Facility: HOSPITAL | Age: 61
End: 2021-01-15

## 2021-01-15 ENCOUNTER — OFFICE VISIT (OUTPATIENT)
Dept: ONCOLOGY | Facility: CLINIC | Age: 61
End: 2021-01-15

## 2021-01-15 VITALS
RESPIRATION RATE: 18 BRPM | WEIGHT: 181.6 LBS | TEMPERATURE: 97.8 F | OXYGEN SATURATION: 98 % | BODY MASS INDEX: 27.52 KG/M2 | HEART RATE: 102 BPM | DIASTOLIC BLOOD PRESSURE: 85 MMHG | SYSTOLIC BLOOD PRESSURE: 146 MMHG | HEIGHT: 68 IN

## 2021-01-15 VITALS
RESPIRATION RATE: 20 BRPM | WEIGHT: 182 LBS | HEART RATE: 105 BPM | SYSTOLIC BLOOD PRESSURE: 147 MMHG | OXYGEN SATURATION: 97 % | BODY MASS INDEX: 28.56 KG/M2 | HEIGHT: 67 IN | TEMPERATURE: 97.7 F | DIASTOLIC BLOOD PRESSURE: 80 MMHG

## 2021-01-15 VITALS — WEIGHT: 182.2 LBS | TEMPERATURE: 98.6 F | HEIGHT: 67 IN | BODY MASS INDEX: 28.6 KG/M2

## 2021-01-15 DIAGNOSIS — K74.69 OTHER CIRRHOSIS OF LIVER (HCC): ICD-10-CM

## 2021-01-15 DIAGNOSIS — M32.9 SYSTEMIC LUPUS ERYTHEMATOSUS, UNSPECIFIED SLE TYPE, UNSPECIFIED ORGAN INVOLVEMENT STATUS (HCC): ICD-10-CM

## 2021-01-15 DIAGNOSIS — M75.101 TEAR OF RIGHT ROTATOR CUFF, UNSPECIFIED TEAR EXTENT, UNSPECIFIED WHETHER TRAUMATIC: ICD-10-CM

## 2021-01-15 DIAGNOSIS — K31.84 GASTROPARESIS: ICD-10-CM

## 2021-01-15 DIAGNOSIS — D69.3 ACUTE ITP (HCC): ICD-10-CM

## 2021-01-15 DIAGNOSIS — D69.6 THROMBOCYTOPENIA (HCC): ICD-10-CM

## 2021-01-15 DIAGNOSIS — I85.11 SECONDARY ESOPHAGEAL VARICES WITH BLEEDING (HCC): ICD-10-CM

## 2021-01-15 DIAGNOSIS — M25.511 RIGHT SHOULDER PAIN, UNSPECIFIED CHRONICITY: Primary | ICD-10-CM

## 2021-01-15 DIAGNOSIS — D50.9 IRON DEFICIENCY ANEMIA, UNSPECIFIED IRON DEFICIENCY ANEMIA TYPE: ICD-10-CM

## 2021-01-15 DIAGNOSIS — D73.1 HYPERSPLENISM: ICD-10-CM

## 2021-01-15 DIAGNOSIS — K76.6 PORTAL HYPERTENSION (HCC): ICD-10-CM

## 2021-01-15 DIAGNOSIS — D73.1 HYPERSPLENISM: Primary | ICD-10-CM

## 2021-01-15 LAB
ALBUMIN SERPL-MCNC: 3.8 G/DL (ref 3.5–5.2)
ALBUMIN/GLOB SERPL: 1.3 G/DL
ALP SERPL-CCNC: 154 U/L (ref 39–117)
ALT SERPL W P-5'-P-CCNC: 23 U/L (ref 1–33)
ANION GAP SERPL CALCULATED.3IONS-SCNC: 8 MMOL/L (ref 5–15)
ANION GAP SERPL CALCULATED.3IONS-SCNC: 8.6 MMOL/L (ref 5–15)
APTT PPP: 30.1 SECONDS (ref 22.7–35.4)
AST SERPL-CCNC: 34 U/L (ref 1–32)
BASOPHILS # BLD AUTO: 0.03 10*3/MM3 (ref 0–0.2)
BASOPHILS # BLD AUTO: 0.05 10*3/MM3 (ref 0–0.2)
BASOPHILS NFR BLD AUTO: 0.8 % (ref 0–1.5)
BASOPHILS NFR BLD AUTO: 1.2 % (ref 0–1.5)
BILIRUB SERPL-MCNC: 1.4 MG/DL (ref 0–1.2)
BUN SERPL-MCNC: 17 MG/DL (ref 6–20)
BUN SERPL-MCNC: 19 MG/DL (ref 6–20)
BUN/CREAT SERPL: 22.1 (ref 7–25)
BUN/CREAT SERPL: 22.6 (ref 7–25)
CALCIUM SPEC-SCNC: 9.3 MG/DL (ref 8.6–10.5)
CALCIUM SPEC-SCNC: 9.4 MG/DL (ref 8.6–10.5)
CHLORIDE SERPL-SCNC: 101 MMOL/L (ref 98–107)
CHLORIDE SERPL-SCNC: 96 MMOL/L (ref 98–107)
CO2 SERPL-SCNC: 25 MMOL/L (ref 22–29)
CO2 SERPL-SCNC: 25.4 MMOL/L (ref 22–29)
CREAT SERPL-MCNC: 0.77 MG/DL (ref 0.57–1)
CREAT SERPL-MCNC: 0.84 MG/DL (ref 0.57–1)
DEPRECATED RDW RBC AUTO: 49.3 FL (ref 37–54)
DEPRECATED RDW RBC AUTO: 53 FL (ref 37–54)
EOSINOPHIL # BLD AUTO: 0.02 10*3/MM3 (ref 0–0.4)
EOSINOPHIL # BLD AUTO: 0.02 10*3/MM3 (ref 0–0.4)
EOSINOPHIL NFR BLD AUTO: 0.5 % (ref 0.3–6.2)
EOSINOPHIL NFR BLD AUTO: 0.5 % (ref 0.3–6.2)
ERYTHROCYTE [DISTWIDTH] IN BLOOD BY AUTOMATED COUNT: 16.3 % (ref 12.3–15.4)
ERYTHROCYTE [DISTWIDTH] IN BLOOD BY AUTOMATED COUNT: 16.3 % (ref 12.3–15.4)
FERRITIN SERPL-MCNC: 128.1 NG/ML (ref 13–150)
GFR SERPL CREATININE-BSD FRML MDRD: 70 ML/MIN/1.73
GFR SERPL CREATININE-BSD FRML MDRD: 77 ML/MIN/1.73
GLOBULIN UR ELPH-MCNC: 2.9 GM/DL
GLUCOSE SERPL-MCNC: 284 MG/DL (ref 65–99)
GLUCOSE SERPL-MCNC: 449 MG/DL (ref 65–99)
HCT VFR BLD AUTO: 33 % (ref 34–46.6)
HCT VFR BLD AUTO: 34.1 % (ref 34–46.6)
HGB BLD-MCNC: 11.8 G/DL (ref 12–15.9)
HGB BLD-MCNC: 11.9 G/DL (ref 12–15.9)
HGB RETIC QN AUTO: 36.2 PG (ref 29.8–36.1)
IMM GRANULOCYTES # BLD AUTO: 0.08 10*3/MM3 (ref 0–0.05)
IMM GRANULOCYTES # BLD AUTO: 0.08 10*3/MM3 (ref 0–0.05)
IMM GRANULOCYTES NFR BLD AUTO: 2 % (ref 0–0.5)
IMM GRANULOCYTES NFR BLD AUTO: 2 % (ref 0–0.5)
IMM RETICS NFR: 14 % (ref 3–15.8)
IRON 24H UR-MRATE: 85 MCG/DL (ref 37–145)
IRON SATN MFR SERPL: 18 % (ref 20–50)
LYMPHOCYTES # BLD AUTO: 0.61 10*3/MM3 (ref 0.7–3.1)
LYMPHOCYTES # BLD AUTO: 0.69 10*3/MM3 (ref 0.7–3.1)
LYMPHOCYTES NFR BLD AUTO: 15.5 % (ref 19.6–45.3)
LYMPHOCYTES NFR BLD AUTO: 17 % (ref 19.6–45.3)
MCH RBC QN AUTO: 30.7 PG (ref 26.6–33)
MCH RBC QN AUTO: 31 PG (ref 26.6–33)
MCHC RBC AUTO-ENTMCNC: 34.6 G/DL (ref 31.5–35.7)
MCHC RBC AUTO-ENTMCNC: 36.1 G/DL (ref 31.5–35.7)
MCV RBC AUTO: 85.3 FL (ref 79–97)
MCV RBC AUTO: 89.5 FL (ref 79–97)
MONOCYTES # BLD AUTO: 0.28 10*3/MM3 (ref 0.1–0.9)
MONOCYTES # BLD AUTO: 0.41 10*3/MM3 (ref 0.1–0.9)
MONOCYTES NFR BLD AUTO: 10.1 % (ref 5–12)
MONOCYTES NFR BLD AUTO: 7.1 % (ref 5–12)
NEUTROPHILS NFR BLD AUTO: 2.82 10*3/MM3 (ref 1.7–7)
NEUTROPHILS NFR BLD AUTO: 2.91 10*3/MM3 (ref 1.7–7)
NEUTROPHILS NFR BLD AUTO: 69.2 % (ref 42.7–76)
NEUTROPHILS NFR BLD AUTO: 74.1 % (ref 42.7–76)
NRBC BLD AUTO-RTO: 0 /100 WBC (ref 0–0.2)
NRBC BLD AUTO-RTO: 0 /100 WBC (ref 0–0.2)
PLATELET # BLD AUTO: 80 10*3/MM3 (ref 140–450)
PLATELET # BLD AUTO: 83 10*3/MM3 (ref 140–450)
PMV BLD AUTO: 10 FL (ref 6–12)
PMV BLD AUTO: 10.6 FL (ref 6–12)
POTASSIUM SERPL-SCNC: 4.1 MMOL/L (ref 3.5–5.2)
POTASSIUM SERPL-SCNC: 4.3 MMOL/L (ref 3.5–5.2)
PROT SERPL-MCNC: 6.7 G/DL (ref 6–8.5)
QT INTERVAL: 379 MS
RBC # BLD AUTO: 3.81 10*6/MM3 (ref 3.77–5.28)
RBC # BLD AUTO: 3.87 10*6/MM3 (ref 3.77–5.28)
RETICS # AUTO: 0.26 10*6/MM3 (ref 0.02–0.13)
RETICS/RBC NFR AUTO: 6.62 % (ref 0.7–1.9)
SODIUM SERPL-SCNC: 129 MMOL/L (ref 136–145)
SODIUM SERPL-SCNC: 135 MMOL/L (ref 136–145)
TIBC SERPL-MCNC: 484 MCG/DL (ref 298–536)
TRANSFERRIN SERPL-MCNC: 325 MG/DL (ref 200–360)
WBC # BLD AUTO: 3.93 10*3/MM3 (ref 3.4–10.8)
WBC # BLD AUTO: 4.07 10*3/MM3 (ref 3.4–10.8)

## 2021-01-15 PROCEDURE — 85046 RETICYTE/HGB CONCENTRATE: CPT | Performed by: INTERNAL MEDICINE

## 2021-01-15 PROCEDURE — 93005 ELECTROCARDIOGRAM TRACING: CPT

## 2021-01-15 PROCEDURE — 83540 ASSAY OF IRON: CPT | Performed by: INTERNAL MEDICINE

## 2021-01-15 PROCEDURE — C9803 HOPD COVID-19 SPEC COLLECT: HCPCS | Performed by: OBSTETRICS & GYNECOLOGY

## 2021-01-15 PROCEDURE — 99214 OFFICE O/P EST MOD 30 MIN: CPT | Performed by: ORTHOPAEDIC SURGERY

## 2021-01-15 PROCEDURE — 80048 BASIC METABOLIC PNL TOTAL CA: CPT

## 2021-01-15 PROCEDURE — U0004 COV-19 TEST NON-CDC HGH THRU: HCPCS | Performed by: OBSTETRICS & GYNECOLOGY

## 2021-01-15 PROCEDURE — 80053 COMPREHEN METABOLIC PANEL: CPT | Performed by: INTERNAL MEDICINE

## 2021-01-15 PROCEDURE — 85025 COMPLETE CBC W/AUTO DIFF WBC: CPT

## 2021-01-15 PROCEDURE — 82728 ASSAY OF FERRITIN: CPT | Performed by: INTERNAL MEDICINE

## 2021-01-15 PROCEDURE — 73030 X-RAY EXAM OF SHOULDER: CPT | Performed by: ORTHOPAEDIC SURGERY

## 2021-01-15 PROCEDURE — 20610 DRAIN/INJ JOINT/BURSA W/O US: CPT | Performed by: ORTHOPAEDIC SURGERY

## 2021-01-15 PROCEDURE — 99213 OFFICE O/P EST LOW 20 MIN: CPT | Performed by: INTERNAL MEDICINE

## 2021-01-15 PROCEDURE — 36415 COLL VENOUS BLD VENIPUNCTURE: CPT

## 2021-01-15 PROCEDURE — 84466 ASSAY OF TRANSFERRIN: CPT | Performed by: INTERNAL MEDICINE

## 2021-01-15 PROCEDURE — 85730 THROMBOPLASTIN TIME PARTIAL: CPT

## 2021-01-15 PROCEDURE — 93010 ELECTROCARDIOGRAM REPORT: CPT | Performed by: INTERNAL MEDICINE

## 2021-01-15 PROCEDURE — 85025 COMPLETE CBC W/AUTO DIFF WBC: CPT | Performed by: INTERNAL MEDICINE

## 2021-01-15 RX ORDER — METHYLPREDNISOLONE ACETATE 80 MG/ML
80 INJECTION, SUSPENSION INTRA-ARTICULAR; INTRALESIONAL; INTRAMUSCULAR; SOFT TISSUE
Status: COMPLETED | OUTPATIENT
Start: 2021-01-15 | End: 2021-01-15

## 2021-01-15 RX ORDER — CHOLESTYRAMINE 4 G/9G
2 POWDER, FOR SUSPENSION ORAL 2 TIMES DAILY WITH MEALS
COMMUNITY
End: 2021-02-12 | Stop reason: SDUPTHER

## 2021-01-15 RX ORDER — PRASTERONE 6.5 MG/1
1 INSERT VAGINAL 2 TIMES WEEKLY
COMMUNITY
End: 2022-02-08

## 2021-01-15 RX ADMIN — METHYLPREDNISOLONE ACETATE 80 MG: 80 INJECTION, SUSPENSION INTRA-ARTICULAR; INTRALESIONAL; INTRAMUSCULAR; SOFT TISSUE at 09:58

## 2021-01-15 NOTE — PROGRESS NOTES
REASON FOR FOLLOWUP:    1. 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   2. Evidence of B12 deficiency on her initial lab evaluation in April 2013.  Patient was started on parenteral B12 replacement.  The patient was switched from shots to daily oral B12 replacement after the visit of 12/02/2013.    3. Hemoglobin has significantly improved since banding of her esophageal varices and increase in her oral iron supplement to twice daily dosing.  4. Morphologically normal bone marrow from 08/20/2013 with normal flow cytometry.  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  Significance of this is uncertain.    5. Mixed connective tissue disorder, currently on Enbrel therapy.  6. Insulin-requiring diabetes mellitus.  7. Patient continues to follow-up periodically with the transplant team at [facility] but currently she is not on the transplant list.  8. 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 Rituxan therapy weekly ×4 completed 7/16/2018     HISTORY OF PRESENT ILLNESS:  Silvia is a 60 y.o. 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  She has had GI bleeding in the past due to portal hypertension.      She also developed acute ITP and received Rituxan weekly ×4.  She completed her Rituxan treatment on 7/16/2018.  She had a good response.  Her platelet count did not correct to normal but did return to her baseline platelet count of around 60,000-90,000.    She also had received 2 doses of IV iron therapy in the form of Injectafer 750 mg each dose delivered on 1/29/2019 and 2/13/2019.      Silvia again required hospitalization at [facility] from 5/28/2019 to 5/30/2019 due to symptomatic edema requiring diuretic therapy.  She also had been hospitalized at [facility] from 5/9/2019 to 5/14/2019 and on that admission she  underwent an esophageal varices banding procedure on 5/10/2019.    She received additional IV Injectafer on 6/26/2019 and 7/3/2019.    She has been in [place] taking care of her dying father for several months and has not been seen in our office since July 2019.  Her father has since passed away and she returns for reevaluation.    She tells me that while in [place] she also was very ill due to her cirrhosis and portal hypertension.  She had bleeding esophageal varices on several occasions requiring admission and transfusions.  She ended up undergoing a TI PS procedure.    On her first visit back in our office after her return to [place] her labs showed some iron deficiency.  She was started on oral iron supplementation and reports that she has been tolerating it okay.  Her hemoglobin has improved significantly from 9 up to 11.  Her platelet count also has improved to 83,000.    We have referred her to Dr. Bird Gray of gastroenterology since Dr. Coleman has retired.  She also will continue to follow-up with the hepatology group at the [facility].    Past Medical History, Past Surgical History, Social History, Family History have been reviewed and are without significant changes except as mentioned.      Review of Systems   Constitutional: Positive for fatigue and fever. Negative for activity change.   HENT: Negative for mouth sores, nosebleeds and trouble swallowing.    Respiratory: Negative for cough, shortness of breath and wheezing.    Cardiovascular: Negative for chest pain and palpitations.   Gastrointestinal: Positive for constipation. Negative for diarrhea and nausea.   Genitourinary: Negative for dysuria and hematuria.   Musculoskeletal: Negative for arthralgias and myalgias.        Muscle cramps   Skin: Negative for rash and wound.   Neurological: Negative for seizures and syncope.   Hematological: Negative for adenopathy. Bruises/bleeds easily.   Psychiatric/Behavioral: Positive for dysphoric mood.  "Negative for confusion.       Medications:  The current medication list was reviewed in the EMR    ALLERGIES:    Allergies   Allergen Reactions   • Albuterol Anaphylaxis   • Imitrex [Sumatriptan] Anaphylaxis   • Tramadol Nausea Only, Other (See Comments) and GI Intolerance     Per pt causes her \"palsy, meaning shaking and tremors\"    • Nsaids Other (See Comments)     Told by MD not to take due to liver problems   • Tylenol [Acetaminophen] Other (See Comments)     Has liver problems and told by MD to not take   • Lactulose Nausea And Vomiting and Other (See Comments)     Severe abdominal pain   • Quinine Derivatives Nausea And Vomiting              There were no vitals filed for this visit.  Physical Exam    CONSTITUTIONAL:  Vital signs reviewed.  No distress, looks comfortable.  EYES:  Conjunctiva and lids unremarkable.  PERRLA  EARS,NOSE,MOUTH,THROAT:  Ears and nose appear unremarkable.  Lips, teeth, gums appear unremarkable.  RESPIRATORY:  Normal respiratory effort.  Lungs clear to auscultation bilaterally.  CARDIOVASCULAR:  Normal S1, S2.  No murmurs rubs or gallops.  No significant lower extremity edema.  GASTROINTESTINAL: Abdomen appears distended with positive fluid wave   MUSCULOSKELETAL:  Unremarkable digits/nails.  No cyanosis or clubbing.  SKIN:  Warm.  No rashes.  PSYCHIATRIC: She seems depressed.  She is on Xifaxan for hepatic encephalopathy.       RECENT LABS:  WBC   Date Value Ref Range Status   01/15/2021 4.07 3.40 - 10.80 10*3/mm3 Final     Hemoglobin   Date Value Ref Range Status   01/15/2021 11.8 (L) 12.0 - 15.9 g/dL Final     Platelets   Date Value Ref Range Status   01/15/2021 83 (L) 140 - 450 10*3/mm3 Final     Lab Results   Component Value Date    NEUTROABS 2.82 01/15/2021     Lab Results   Component Value Date    IRON 65 12/04/2020    TIBC 493 12/04/2020    FERRITIN 43.40 12/04/2020               ASSESSMENT/PLAN:     *ITP.   Her platelets now seem to be back at her baseline in the 50,000 - " 100,000 range from hypersplenism.    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     *B12 deficiency.  On oral B12 since December 2013.     *History of esophageal varices banding.  She since has undergone a TI PS procedure     *Iron deficiency anemia due to GI bleeding.  Her hemoglobin is improved with oral iron supplementation.    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  This is of unknown significance.     *Rheumatoid arthritis.       *Lupus     *Diabetes.  Hyperglycemia from steroids may be a problem.       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          Plan  1.  We have asked Silvia to continue her oral iron supplementation for now.  2.  We scheduled for labs in our office monthly for CBC in 1 month and 2 months.  3.  MD follow-up in 3 months with repeat labs including repeat iron panel and ferritin at that time.

## 2021-01-15 NOTE — PROGRESS NOTES
"  Patient: Silvia Zabala  YOB: 1960  Date of Service: 1/15/2021    Chief Complaints: No chief complaint on file.      Subjective:    History of Present Illness: Pt is seen in the office today with complaints of No chief complaint on file.  .          Allergies:   Allergies   Allergen Reactions   • Albuterol Anaphylaxis   • Imitrex [Sumatriptan] Anaphylaxis   • Tramadol Nausea Only, Other (See Comments) and GI Intolerance     Per pt causes her \"palsy, meaning shaking and tremors\"    • Lactulose Nausea And Vomiting and Other (See Comments)     Severe abdominal pain   • Quinine Derivatives Nausea And Vomiting       Medications:   Home Medications:  Current Outpatient Medications on File Prior to Visit   Medication Sig   • ALPRAZolam (XANAX) 0.5 MG tablet Take 1 tablet by mouth 2 (Two) Times a Day As Needed for Anxiety. (Patient taking differently: Take 0.5 mg by mouth Daily.)   • Cholecalciferol (Vitamin D3) 25 MCG (1000 UT) capsule Vitamin D3 25 mcg (1,000 unit) capsule   Take by oral route.   • cholestyramine light 4 g powder Take 1 packet by mouth 2 (Two) Times a Day.   • Continuous Blood Gluc Sensor ([place]inglepharb YESIKA SENSOR SYSTEM) 1 Device Every 14 (Fourteen) Days.   • Docusate Sodium (COLACE PO) Take  by mouth every night at bedtime.   • esomeprazole (nexIUM) 40 MG capsule esomeprazole magnesium 40 mg capsule,delayed release   • estradiol (VAGIFEM) 10 MCG tablet vaginal tablet Insert 1 tablet into the vagina 2 (Two) Times a Week.   • ferrous sulfate 325 (65 FE) MG tablet TAKE ONE TABLET BY MOUTH TWICE A DAY   • furosemide (LASIX) 80 MG tablet Take 80 mg by mouth Daily.   • glucose blood test strip OneTouch Ultra Blue Test Strip   • hydrOXYzine (ATARAX) 25 MG tablet Take 2 tablets by mouth Every Night.   • Insulin Glargine (LANTUS SOLOSTAR) 100 UNIT/ML injection pen Inject 70 units in the am and 70 units in the pm (Patient taking differently: 80 Units. Inject 80 units in the am and 80 units in the " pm)   • Insulin Lispro (HUMALOG KWIKPEN) 100 UNIT/ML solution pen-injector INJECT UP TO 60 UNITS THREE TIMES A DAY WITH MEAL   • levETIRAcetam (KEPPRA) 500 MG tablet TAKE ONE TABLET BY MOUTH TWICE A DAY   • Magnesium Oxide 400 MG capsule magnesium oxide 400 mg (241.3 mg magnesium) tablet   • metaxalone (SKELAXIN) 800 MG tablet metaxalone 800 mg tablet   • metoclopramide (REGLAN) 5 MG tablet Take 5 mg by mouth 4 (Four) Times a Day Before Meals & at Bedtime.   • mirtazapine (REMERON) 15 MG tablet mirtazapine 15 mg tablet   • Multiple Vitamins-Minerals (MULTIVITAMIN WITH MINERALS) tablet tablet Take 1 tablet by mouth Daily.   • OLANZapine (zyPREXA) 5 MG tablet Take 5 mg by mouth Every Night.   • OxyCODONE HCl (OXAYDO) 7.5 MG tablet  Take 7.5 mg by mouth Every 8 (Eight) Hours As Needed.   • polyethylene glycol (MIRALAX) powder Take 17 g by mouth Daily.   • pregabalin (LYRICA) 75 MG capsule Take 75 mg by mouth 2 (Two) Times a Day.   • promethazine (PHENERGAN) 25 MG tablet Take 1 tablet by mouth Every 6 (Six) Hours As Needed for Nausea or Vomiting.   • spironolactone (ALDACTONE) 100 MG tablet TAKE ONE TABLET BY MOUTH DAILY   • sucralfate (CARAFATE) 1 GM/10ML suspension sucralfate 100 mg/mL oral suspension   • vitamin B-12 (CYANOCOBALAMIN) 1000 MCG tablet Take 1,000 mcg by mouth daily.   • vitamin B-12 (CYANOCOBALAMIN) 1000 MCG tablet Take 1,000 mcg by mouth Daily.   • XIFAXAN 550 MG tablet TAKE ONE TABLET BY MOUTH TWICE A DAY     No current facility-administered medications on file prior to visit.      Current Medications:  Scheduled Meds:  Continuous Infusions:No current facility-administered medications for this visit.     PRN Meds:.    I have reviewed the patient's medical history in detail and updated the computerized patient record.  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   • Cirrhosis (CMS/HCC)    • Colon polyps    • Deafness    • Depression    • Diabetes mellitus (CMS/HCC)     Adult onset, insulin requiring   • Duodenal ulcer with hemorrhage    • Esophageal varices determined by endoscopy (CMS/HCC)    • Fibromyalgia    • Gallbladder disease    • Gastric varices    • Gastroparesis    • Glaucoma    • Granuloma annulare    • H/O B12 deficiency    • H/O Bleeding ulcer     And gastroesophageal varices   • H/O mixed connective tissue disorder    • Hemorrhoids    • Hiatal hernia    • History of transfusion    • Hx of being hospitalized     In [place] for GI bleeding from ulcer and varices   • Hyperlipidemia    • Migraine    • DUKES (nonalcoholic steatohepatitis) 11/2016   • Orthostatic hypotension 02/2019   • BRICE (obstructive sleep apnea)    • Pancreas divisum    • Pancytopenia (CMS/HCC)     Chronic, due to cirrhosis and hypersplenism   • Peptic ulcer disease     And esophageal varices   • PONV (postoperative nausea and vomiting)    • RA (rheumatoid arthritis) (CMS/HCC)    • Seizure disorder (CMS/HCC)     LAST ONE SEVERAL YEARS AGO   • Seizures (CMS/HCC)    • Systemic lupus (CMS/HCC)         Past Surgical History:   Procedure Laterality Date   • BLADDER REPAIR  1991   • CATARACT EXTRACTION     • CHOLECYSTECTOMY  1994   • ENDOSCOPY N/A 11/7/2016    Procedure: ESOPHAGOGASTRODUODENOSCOPY WITH COLD POLYPECTOMY, BANDING,  AND CLIPS TIMES 2;  Surgeon: Rich Coleman MD;  Location: [facility] ENDOSCOPY;  Service:    • ENDOSCOPY N/A 12/22/2016    Procedure: ESOPHAGOGASTRODUODENOSCOPY WITH ESOPHAGEAL BANDING. 5 BANDS FIRED, 4 BANDS ADHERERD TO MUCOSA;  Surgeon: Rich Coleman MD;  Location: [facility] ENDOSCOPY;  Service:    • ENDOSCOPY N/A 2/15/2017    Procedure: ESOPHAGOGASTRODUODENOSCOPY AT BEDSIDE with esophageal varices banding (3);  Surgeon: Rich Coleman MD;  " Location: [facility] ENDOSCOPY;  Service:    • ENDOSCOPY N/A 2017    Procedure: ESOPHAGOGASTRODUODENOSCOPY WITH ESOPHAGEAL VARICES BANDING x2;  Surgeon: Rich Coleman MD;  Location[facility] ENDOSCOPY;  Service:    • ENDOSCOPY N/A 2017    Procedure: ESOPHAGOGASTRODUODENOSCOPY with biopsies;  Surgeon: Rich Coleman MD;  Location: [facility] ENDOSCOPY;  Service:    • ENDOSCOPY N/A 10/5/2018    Procedure: ESOPHAGOGASTRODUODENOSCOPY;  Surgeon: Rich Coleman MD;  Location: [facility] ENDOSCOPY;  Service: Gastroenterology   • ENDOSCOPY N/A 5/10/2019    Procedure: ESOPHAGOGASTRODUODENOSCOPY AT BEDSIDE WITH VARICEAL LIGATION;  Surgeon: Rich Coleman MD;  Location: [facility] ENDOSCOPY;  Service: Gastroenterology   • ENDOSCOPY N/A 2019    Procedure: ESOPHAGOGASTRODUODENOSCOPY WITH ESOPHAGEAL BANDING (3 BANDS);  Surgeon: Rich Coleman MD;  Location: [facility] ENDOSCOPY;  Service: Gastroenterology   • ENDOSCOPY AND COLONOSCOPY      Dr. Rich Coleman with findings of candida esophagitis   • EYE SURGERY     • HYSTERECTOMY      partial   • JOINT REPLACEMENT     • KNEE SURGERY Right     \"right knee recontstruction\"   • LIVER BIOPSY  2015   • MASS EXCISION     • STOMACH SURGERY          Social History     Occupational History     Employer: DISABLED   Tobacco Use   • Smoking status: Former Smoker     Packs/day: 0.00     Years: 0.00     Pack years: 0.00     Quit date: 2015     Years since quittin.0   • Smokeless tobacco: Never Used   Substance and Sexual Activity   • Alcohol use: No   • Drug use: No   • Sexual activity: Yes     Partners: Male      Social History     Social History Narrative    Moved to [place] from [place]. She is currently medically disabled.        Family History   Problem Relation Age of Onset   • Liver disease Other    • Depression Other    • Heart disease Other    • Hypertension Other    • Diabetes Other    • Breast cancer Other    • Brain cancer Other    • Uterine " "cancer Other    • Colon cancer Other    • Leukemia Other    • Colon cancer Maternal Aunt    • Hypertension Mother    • Diabetes type II Mother    • Rheum arthritis Mother    • Liver disease Mother         DUKES   • Heart disease Father    • Diabetes type II Father    • Diabetes type II Sister    • Lupus Sister    • Malig Hyperthermia Neg Hx        ROS: 14 point review of systems was performed and was negative except for documented findings in HPI and today's encounter.     Allergies:   Allergies   Allergen Reactions   • Albuterol Anaphylaxis   • Imitrex [Sumatriptan] Anaphylaxis   • Tramadol Nausea Only, Other (See Comments) and GI Intolerance     Per pt causes her \"palsy, meaning shaking and tremors\"    • Lactulose Nausea And Vomiting and Other (See Comments)     Severe abdominal pain   • Quinine Derivatives Nausea And Vomiting     Constitutional:  Denies fever, shaking or chills   Eyes:  Denies change in visual acuity   HENT:  Denies nasal congestion or sore throat   Respiratory:  Denies cough or shortness of breath   Cardiovascular:  Denies chest pain or severe LE edema   GI:  Denies abdominal pain, nausea, vomiting, bloody stools or diarrhea   Musculoskeletal:  Numbness, tingling, or loss of motor function only as noted above in history of present illness.  : Denies painful urination or hematuria  Integument:  Denies rash, lesion or ulceration   Neurologic:  Denies headache or focal weakness  Endocrine:  Denies lymphadenopathy  Psych:  Denies confusion or change in mental status   Hem:  Denies active bleeding      Physical Exam: 60 y.o. female  Wt Readings from Last 3 Encounters:   12/11/20 81.2 kg (179 lb 1.6 oz)   12/10/20 81.3 kg (179 lb 4.8 oz)   12/04/20 82.9 kg (182 lb 11.2 oz)     *** HELP TEXT ***    This SmartLink requires parameters. Parameters are variables that are added to the SmartLink name to request specific information. The parameter for .lastht is the number of readings to display.    For " example: .lastht[4    In this example, the SmartLink displays the last four encounter readings.    There is no height or weight on file to calculate BMI.  No height and weight on file for this encounter.  There were no vitals filed for this visit.  Vital signs reviewed.   General Appearance:    Alert, cooperative, in no acute distress                    Ortho exam             .time    Assessment:     Plan:   Follow up as indicated.  Ice, elevate, and rest as needed.  Discussed conservative measures of pain control including ice, bracing.  Also talked about the importance of strengthening and maintaining ideal body weight    Sade Roa M.D.

## 2021-01-15 NOTE — PROGRESS NOTES
"   New Right Shoulder      Patient: Silvia Zabala        YOB: 1960    Medical Record Number: [id number]        Chief Complaints: right shoulder pain       History of Present Illness: This is a 60-year-old female who is right-hand dominant who I saw previously for left shoulder we injected that she states she got a little bit of relief she had a prior fracture on the left shoulder right 1 is now been bothering her she states is been bothering her for years but much worse recently she does have significant significant night pain and has problem pulling the covers up.  Past medical history is marked for liver disease, Wong syndrome anemia pancytopenia 12 deficiency depression cirrhosis    Left shoulder got temporary relief from the injection still bothering her with activity and bothering her at night    Allergies:   Allergies   Allergen Reactions   • Albuterol Anaphylaxis   • Imitrex [Sumatriptan] Anaphylaxis   • Tramadol Nausea Only, Other (See Comments) and GI Intolerance     Per pt causes her \"palsy, meaning shaking and tremors\"    • Nsaids Other (See Comments)     Told by MD not to take due to liver problems   • Tylenol [Acetaminophen] Other (See Comments)     Has liver problems and told by MD to not take   • Lactulose Nausea And Vomiting and Other (See Comments)     Severe abdominal pain   • Quinine Derivatives Nausea And Vomiting       Medications:   Home Medications:  Current Outpatient Medications on File Prior to Visit   Medication Sig   • ALPRAZolam (XANAX) 0.5 MG tablet Take 1 tablet by mouth 2 (Two) Times a Day As Needed for Anxiety. (Patient taking differently: Take 0.5 mg by mouth 2 (Two) Times a Day As Needed for Anxiety or Sleep.)   • Cholecalciferol (Vitamin D3) 25 MCG (1000 UT) capsule Vitamin D3 25 mcg (1,000 unit) capsule   Take by oral route.   • cholestyramine (Questran) 4 g packet Take 2 packets by mouth 2 (Two) Times a Day With Meals.   • cholestyramine light 4 g powder Take 1 " packet by mouth 2 (Two) Times a Day. (Patient taking differently: Take 8 g by mouth 2 (Two) Times a Day.)   • Continuous Blood Gluc Sensor (FREESTYLE YESIKA SENSOR SYSTEM) 1 Device Every 14 (Fourteen) Days.   • Docusate Sodium (COLACE PO) Take 1 capsule by mouth every night at bedtime.   • esomeprazole (nexIUM) 40 MG capsule Take 40 mg by mouth Every Morning Before Breakfast.   • ferrous sulfate 325 (65 FE) MG tablet TAKE ONE TABLET BY MOUTH TWICE A DAY   • furosemide (LASIX) 80 MG tablet Take 80 mg by mouth Daily As Needed (swelling).   • glucose blood test strip OneTouch Ultra Blue Test Strip   • hydrOXYzine (ATARAX) 25 MG tablet Take 2 tablets by mouth Every Night.   • Insulin Glargine (LANTUS SOLOSTAR) 100 UNIT/ML injection pen Inject 70 units in the am and 70 units in the pm (Patient taking differently: 80 Units. Inject 80 units in the am and 80 units in the pm)   • Insulin Lispro (HUMALOG KWIKPEN) 100 UNIT/ML solution pen-injector INJECT UP TO 60 UNITS THREE TIMES A DAY WITH MEAL (Patient taking differently: INJECT UP TO 60 UNITS THREE TIMES A DAY WITH MEAL  Pt has sliding scale)   • levETIRAcetam (KEPPRA) 500 MG tablet TAKE ONE TABLET BY MOUTH TWICE A DAY   • Magnesium Oxide 400 MG capsule Take 400 mg by mouth Daily.   • metaxalone (SKELAXIN) 800 MG tablet Take 800 mg by mouth 3 (Three) Times a Day.   • metoclopramide (REGLAN) 5 MG tablet Take 5 mg by mouth 4 (Four) Times a Day Before Meals & at Bedtime.   • mirtazapine (REMERON) 15 MG tablet Take 15 mg by mouth Every Night.   • Multiple Vitamins-Minerals (MULTIVITAMIN WITH MINERALS) tablet tablet Take 1 tablet by mouth Daily.   • OLANZapine (zyPREXA) 5 MG tablet Take 5 mg by mouth Every Night.   • OxyCODONE HCl (OXAYDO) 7.5 MG tablet  Take 7.5 mg by mouth Every 8 (Eight) Hours As Needed.   • polyethylene glycol (MIRALAX) powder Take 17 g by mouth Daily As Needed.   • Prasterone (Intrarosa) 6.5 MG insert Insert 1 each into the vagina 2 (Two) Times a Week.   •  pregabalin (LYRICA) 75 MG capsule Take 75 mg by mouth 2 (Two) Times a Day.   • promethazine (PHENERGAN) 25 MG tablet Take 1 tablet by mouth Every 6 (Six) Hours As Needed for Nausea or Vomiting.   • spironolactone (ALDACTONE) 100 MG tablet TAKE ONE TABLET BY MOUTH DAILY (Patient taking differently: Take 100 mg by mouth Daily As Needed (swelling).)   • sucralfate (CARAFATE) 1 GM/10ML suspension Take 1 g by mouth 3 (Three) Times a Day.   • vitamin B-12 (CYANOCOBALAMIN) 1000 MCG tablet Take 1,000 mcg by mouth Daily.   • XIFAXAN 550 MG tablet TAKE ONE TABLET BY MOUTH TWICE A DAY   • [DISCONTINUED] estradiol (VAGIFEM) 10 MCG tablet vaginal tablet Insert 1 tablet into the vagina 2 (Two) Times a Week.   • [DISCONTINUED] vitamin B-12 (CYANOCOBALAMIN) 1000 MCG tablet Take 1,000 mcg by mouth daily.     No current facility-administered medications on file prior to visit.      Current Medications:  Scheduled Meds:  Continuous Infusions:No current facility-administered medications for this visit.     PRN Meds:.    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   • Cirrhosis (CMS/HCC)    • Clostridium difficile infection    • Colon polyps    • Depression    • Diabetes mellitus (CMS/HCC)     Adult onset, insulin requiring   • Duodenal ulcer with hemorrhage    • Esophageal varices determined by endoscopy (CMS/HCC)    • Fibromyalgia    • Gallbladder disease    • Gastric varices    • Gastroparesis    • Glaucoma    • Granuloma annulare    • H/O B12 deficiency    • H/O Bleeding ulcer     And gastroesophageal varices   • H/O mixed connective tissue disorder    • Hemorrhoids    • Hiatal hernia    • History of transfusion     needs bendryl  flushes sensation and temp goes up   • Hx of being hospitalized     In [place] for  GI bleeding from ulcer and varices   • Hyperlipidemia    • Migraine    • Mitral valve prolapse    • DUKES (nonalcoholic steatohepatitis) 11/2016   • Orthostatic hypotension 02/2019   • BRICE (obstructive sleep apnea)    • Pancreas divisum    • Pancytopenia (CMS/HCC)     Chronic, due to cirrhosis and hypersplenism   • Peptic ulcer disease     And esophageal varices   • PONV (postoperative nausea and vomiting)    • RA (rheumatoid arthritis) (CMS/HCC)    • Seizure disorder (CMS/HCC)     last 2003   • Seizures (CMS/HCC)    • Systemic lupus (CMS/HCC)    • TIA (transient ischemic attack) 1984        Past Surgical History:   Procedure Laterality Date   • BLADDER REPAIR  1991   • CATARACT EXTRACTION     • CHOLECYSTECTOMY  1994   • COLONOSCOPY     • ENDOSCOPY N/A 11/7/2016    Procedure: ESOPHAGOGASTRODUODENOSCOPY WITH COLD POLYPECTOMY, BANDING,  AND CLIPS TIMES 2;  Surgeon: Rich Coleman MD;  Location: [facility] ENDOSCOPY;  Service:    • ENDOSCOPY N/A 12/22/2016    Procedure: ESOPHAGOGASTRODUODENOSCOPY WITH ESOPHAGEAL BANDING. 5 BANDS FIRED, 4 BANDS ADHERERD TO MUCOSA;  Surgeon: Rich Coleman MD;  Location: [facility] ENDOSCOPY;  Service:    • ENDOSCOPY N/A 2/15/2017    Procedure: ESOPHAGOGASTRODUODENOSCOPY AT BEDSIDE with esophageal varices banding (3);  Surgeon: Rich Coleman MD;  Location: [facility] ENDOSCOPY;  Service:    • ENDOSCOPY N/A 4/6/2017    Procedure: ESOPHAGOGASTRODUODENOSCOPY WITH ESOPHAGEAL VARICES BANDING x2;  Surgeon: Rich Coleman MD;  Location: [facility] ENDOSCOPY;  Service:    • ENDOSCOPY N/A 11/24/2017    Procedure: ESOPHAGOGASTRODUODENOSCOPY with biopsies;  Surgeon: Rich Coleman MD;  Location: [facility] ENDOSCOPY;  Service:    • ENDOSCOPY N/A 10/5/2018    Procedure: ESOPHAGOGASTRODUODENOSCOPY;  Surgeon: Rich Coleman MD;  Location: [facility] ENDOSCOPY;  Service: Gastroenterology   • ENDOSCOPY N/A 5/10/2019    Procedure: ESOPHAGOGASTRODUODENOSCOPY AT BEDSIDE WITH VARICEAL LIGATION;  Surgeon: [place]  "Rich GONZALEZ MD;  Location: [facility] ENDOSCOPY;  Service: Gastroenterology   • ENDOSCOPY N/A 2019    Procedure: ESOPHAGOGASTRODUODENOSCOPY WITH ESOPHAGEAL BANDING (3 BANDS);  Surgeon: Rich Coleman MD;  Location: [facility] ENDOSCOPY;  Service: Gastroenterology   • ENDOSCOPY AND COLONOSCOPY      Dr. Rich Coleman with findings of candida esophagitis   • EYE SURGERY     • HYSTERECTOMY      partial   • JOINT REPLACEMENT     • KNEE SURGERY Right     \"right knee recontstruction\"   • LIVER BIOPSY  2015   • MASS EXCISION     • STOMACH SURGERY     • TIPS PROCEDURE  2020    x2        Social History     Occupational History     Employer: DISABLED   Tobacco Use   • Smoking status: Former Smoker     Packs/day: 1.00     Years: 4.00     Pack years: 4.00     Types: Cigarettes     Quit date: 2015     Years since quittin.0   • Smokeless tobacco: Never Used   Substance and Sexual Activity   • Alcohol use: No   • Drug use: Never   • Sexual activity: Not on file      Social History     Social History Narrative    Moved to [place] from [place]. She is currently medically disabled.        Family History   Problem Relation Age of Onset   • Liver disease Other    • Depression Other    • Heart disease Other    • Hypertension Other    • Diabetes Other    • Breast cancer Other    • Brain cancer Other    • Uterine cancer Other    • Colon cancer Other    • Leukemia Other    • Colon cancer Maternal Aunt    • Hypertension Mother    • Diabetes type II Mother    • Rheum arthritis Mother    • Liver disease Mother         DUKES   • Heart disease Father    • Diabetes type II Father    • Diabetes type II Sister    • Lupus Sister    • Malig Hyperthermia Neg Hx              Review of Systems: 14 point review of systems are remarkable for multiple pertinent positives again she did check suicidal ideas I have talked to her about this the last time she states she has had those in the past she is doing just fine right now talked " to her therapist    Review of Systems      Physical Exam: 60 y.o. female  General Appearance:    Alert, cooperative, in no acute distress                 There were no vitals filed for this visit.   Patient is alert and read ×3 no acute distress appears her above-listed at height weight and age.  Affect is normal respiratory rate is normal unlabored. Heart rate regular rate rhythm, sclera, dentition and hearing are normal for the purpose of this exam.    Ortho Exam  Physical exam of the right shoulder reveals no overlying skin changes no lymphedema no lymphadenopathy.  Patient has active flexion 170 with mild symptoms abduction is similar external rotation is to 50 and internal rotation to the upper lumbar spine with mild symptoms.  Patient has good rotator cuff strength 4+ over 5 with isometric strength testing with pain.  Patient has a positive impingement and a positive Damian sign.  Patient has good cervical range of motion which is full and asymptomatic no radicular symptoms.  Patient has a normal elbow exam.  Good distal pulses are present  Patient has pain with overhead activity and a positive Neer sign and a positive empty can sign , a positive drop arm and a definitive painful arc    Exam of the left shoulder essentially unchanged she can active flex to about 160 abduction similar to rotation to 40 rotator cuff strength is 4+/5 with pain  Large Joint Arthrocentesis: R subacromial bursa  Date/Time: 1/15/2021 9:58 AM  Consent given by: patient  Site marked: site marked  Timeout: Immediately prior to procedure a time out was called to verify the correct patient, procedure, equipment, support staff and site/side marked as required   Supporting Documentation  Indications: pain   Procedure Details  Location: shoulder - R subacromial bursa  Preparation: Patient was prepped and draped in the usual sterile fashion  Needle size: 22 G  Approach: posterior  Medications administered: 4 mL lidocaine (cardiac); 80 mg  methylPREDNISolone acetate 80 MG/ML  Patient tolerance: patient tolerated the procedure well with no immediate complications                Radiology:   AP, Scapular Y and Axillary Lateral of the right shoulder were ordered/reviewed to evauate shoulder pain.  I do have comparative films from 2017 of the right shoulder she has some acromioclavicular arthritis but otherwise a heads well-seated within the glenoid  Imaging Results (Most Recent)     Procedure Component Value Units Date/Time    XR Shoulder 2+ View Right [[id number]] Resulted: 01/15/21 0939     Updated: 01/15/21 0939    Impression:      Ordering physician's impression is located in the Encounter Note dated 01/15/21. X-ray performed in the DR room.          Assessment/Plan: Right shoulder pain I think this is rotator cuff in some fashion at only she is a great operative candidate at this time plan is to proceed with an injection as a diagnostic and therapeutic tool she fails to improve we could consider doing other means of testing right shoulder got little relief from injection we talked about arthroplasty at this time she wants to hold off and think surgical which I think is raised  Cortisone Injection. See procedure note.  Cortisone Injection for DIAGNOSTIC and THERAPUTIC purposes.

## 2021-01-16 LAB — SARS-COV-2 RNA RESP QL NAA+PROBE: NOT DETECTED

## 2021-01-18 ENCOUNTER — ANESTHESIA EVENT (OUTPATIENT)
Dept: PERIOP | Facility: HOSPITAL | Age: 61
End: 2021-01-18

## 2021-01-18 ENCOUNTER — HOSPITAL ENCOUNTER (OUTPATIENT)
Facility: HOSPITAL | Age: 61
Setting detail: HOSPITAL OUTPATIENT SURGERY
Discharge: HOME | End: 2021-01-18
Attending: OBSTETRICS & GYNECOLOGY | Admitting: OBSTETRICS & GYNECOLOGY

## 2021-01-18 ENCOUNTER — ANESTHESIA (OUTPATIENT)
Dept: PERIOP | Facility: HOSPITAL | Age: 61
End: 2021-01-18

## 2021-01-18 VITALS
TEMPERATURE: 97.5 F | OXYGEN SATURATION: 94 % | WEIGHT: 176.7 LBS | BODY MASS INDEX: 27.73 KG/M2 | HEIGHT: 67 IN | HEART RATE: 79 BPM | RESPIRATION RATE: 16 BRPM | DIASTOLIC BLOOD PRESSURE: 68 MMHG | SYSTOLIC BLOOD PRESSURE: 139 MMHG

## 2021-01-18 DIAGNOSIS — N90.7 LABIAL CYST: Primary | ICD-10-CM

## 2021-01-18 DIAGNOSIS — L72.9 CYST OF SKIN: ICD-10-CM

## 2021-01-18 LAB
GLUCOSE BLDC GLUCOMTR-MCNC: 218 MG/DL (ref 70–130)
GLUCOSE BLDC GLUCOMTR-MCNC: 288 MG/DL (ref 70–130)

## 2021-01-18 PROCEDURE — 25010000002 MIDAZOLAM PER 1 MG: Performed by: ANESTHESIOLOGY

## 2021-01-18 PROCEDURE — 25010000002 PROPOFOL 10 MG/ML EMULSION: Performed by: ANESTHESIOLOGY

## 2021-01-18 PROCEDURE — 82962 GLUCOSE BLOOD TEST: CPT

## 2021-01-18 PROCEDURE — 25010000003 LIDOCAINE 1 % SOLUTION: Performed by: OBSTETRICS & GYNECOLOGY

## 2021-01-18 PROCEDURE — 25010000002 FENTANYL CITRATE (PF) 100 MCG/2ML SOLUTION: Performed by: ANESTHESIOLOGY

## 2021-01-18 RX ORDER — HYDROCODONE BITARTRATE AND ACETAMINOPHEN 7.5; 325 MG/1; MG/1
1 TABLET ORAL ONCE AS NEEDED
Status: CANCELLED | OUTPATIENT
Start: 2021-01-18

## 2021-01-18 RX ORDER — LABETALOL HYDROCHLORIDE 5 MG/ML
5 INJECTION, SOLUTION INTRAVENOUS
Status: DISCONTINUED | OUTPATIENT
Start: 2021-01-18 | End: 2021-01-18 | Stop reason: HOSPADM

## 2021-01-18 RX ORDER — OXYCODONE HYDROCHLORIDE 5 MG/1
5 TABLET ORAL EVERY 6 HOURS PRN
Qty: 10 TABLET | Refills: 0 | Status: SHIPPED | OUTPATIENT
Start: 2021-01-18 | End: 2021-01-29 | Stop reason: CLARIF

## 2021-01-18 RX ORDER — DIPHENHYDRAMINE HCL 25 MG
25 CAPSULE ORAL
Status: DISCONTINUED | OUTPATIENT
Start: 2021-01-18 | End: 2021-01-18 | Stop reason: HOSPADM

## 2021-01-18 RX ORDER — FLUMAZENIL 0.1 MG/ML
0.2 INJECTION INTRAVENOUS AS NEEDED
Status: DISCONTINUED | OUTPATIENT
Start: 2021-01-18 | End: 2021-01-18 | Stop reason: HOSPADM

## 2021-01-18 RX ORDER — SODIUM CHLORIDE 0.9 % (FLUSH) 0.9 %
3-10 SYRINGE (ML) INJECTION AS NEEDED
Status: DISCONTINUED | OUTPATIENT
Start: 2021-01-18 | End: 2021-01-18 | Stop reason: HOSPADM

## 2021-01-18 RX ORDER — MAGNESIUM HYDROXIDE 1200 MG/15ML
LIQUID ORAL AS NEEDED
Status: DISCONTINUED | OUTPATIENT
Start: 2021-01-18 | End: 2021-01-18 | Stop reason: HOSPADM

## 2021-01-18 RX ORDER — HYDRALAZINE HYDROCHLORIDE 20 MG/ML
5 INJECTION INTRAMUSCULAR; INTRAVENOUS
Status: DISCONTINUED | OUTPATIENT
Start: 2021-01-18 | End: 2021-01-18 | Stop reason: HOSPADM

## 2021-01-18 RX ORDER — LIDOCAINE HYDROCHLORIDE 10 MG/ML
0.5 INJECTION, SOLUTION EPIDURAL; INFILTRATION; INTRACAUDAL; PERINEURAL ONCE AS NEEDED
Status: DISCONTINUED | OUTPATIENT
Start: 2021-01-18 | End: 2021-01-18 | Stop reason: HOSPADM

## 2021-01-18 RX ORDER — LIDOCAINE HYDROCHLORIDE 10 MG/ML
INJECTION, SOLUTION INFILTRATION; PERINEURAL AS NEEDED
Status: DISCONTINUED | OUTPATIENT
Start: 2021-01-18 | End: 2021-01-18 | Stop reason: HOSPADM

## 2021-01-18 RX ORDER — SODIUM CHLORIDE 0.9 % (FLUSH) 0.9 %
3 SYRINGE (ML) INJECTION EVERY 12 HOURS SCHEDULED
Status: DISCONTINUED | OUTPATIENT
Start: 2021-01-18 | End: 2021-01-18 | Stop reason: HOSPADM

## 2021-01-18 RX ORDER — HYDROMORPHONE HYDROCHLORIDE 1 MG/ML
0.5 INJECTION, SOLUTION INTRAMUSCULAR; INTRAVENOUS; SUBCUTANEOUS
Status: CANCELLED | OUTPATIENT
Start: 2021-01-18

## 2021-01-18 RX ORDER — PROMETHAZINE HYDROCHLORIDE 25 MG/1
25 TABLET ORAL ONCE AS NEEDED
Status: DISCONTINUED | OUTPATIENT
Start: 2021-01-18 | End: 2021-01-18 | Stop reason: HOSPADM

## 2021-01-18 RX ORDER — OXYCODONE AND ACETAMINOPHEN 7.5; 325 MG/1; MG/1
1 TABLET ORAL ONCE AS NEEDED
Status: CANCELLED | OUTPATIENT
Start: 2021-01-18

## 2021-01-18 RX ORDER — EPHEDRINE SULFATE 50 MG/ML
5 INJECTION, SOLUTION INTRAVENOUS ONCE AS NEEDED
Status: CANCELLED | OUTPATIENT
Start: 2021-01-18

## 2021-01-18 RX ORDER — NALOXONE HCL 0.4 MG/ML
0.2 VIAL (ML) INJECTION AS NEEDED
Status: DISCONTINUED | OUTPATIENT
Start: 2021-01-18 | End: 2021-01-18 | Stop reason: HOSPADM

## 2021-01-18 RX ORDER — ONDANSETRON 2 MG/ML
4 INJECTION INTRAMUSCULAR; INTRAVENOUS ONCE AS NEEDED
Status: CANCELLED | OUTPATIENT
Start: 2021-01-18

## 2021-01-18 RX ORDER — MIDAZOLAM HYDROCHLORIDE 1 MG/ML
INJECTION INTRAMUSCULAR; INTRAVENOUS AS NEEDED
Status: DISCONTINUED | OUTPATIENT
Start: 2021-01-18 | End: 2021-01-18 | Stop reason: SURG

## 2021-01-18 RX ORDER — FENTANYL CITRATE 50 UG/ML
INJECTION, SOLUTION INTRAMUSCULAR; INTRAVENOUS AS NEEDED
Status: DISCONTINUED | OUTPATIENT
Start: 2021-01-18 | End: 2021-01-18 | Stop reason: SURG

## 2021-01-18 RX ORDER — DIPHENHYDRAMINE HYDROCHLORIDE 50 MG/ML
12.5 INJECTION INTRAMUSCULAR; INTRAVENOUS
Status: DISCONTINUED | OUTPATIENT
Start: 2021-01-18 | End: 2021-01-18 | Stop reason: HOSPADM

## 2021-01-18 RX ORDER — PROMETHAZINE HYDROCHLORIDE 25 MG/1
25 SUPPOSITORY RECTAL ONCE AS NEEDED
Status: DISCONTINUED | OUTPATIENT
Start: 2021-01-18 | End: 2021-01-18 | Stop reason: HOSPADM

## 2021-01-18 RX ORDER — PROPOFOL 10 MG/ML
VIAL (ML) INTRAVENOUS AS NEEDED
Status: DISCONTINUED | OUTPATIENT
Start: 2021-01-18 | End: 2021-01-18 | Stop reason: SURG

## 2021-01-18 RX ORDER — FAMOTIDINE 10 MG/ML
20 INJECTION, SOLUTION INTRAVENOUS ONCE
Status: COMPLETED | OUTPATIENT
Start: 2021-01-18 | End: 2021-01-18

## 2021-01-18 RX ORDER — FENTANYL CITRATE 50 UG/ML
50 INJECTION, SOLUTION INTRAMUSCULAR; INTRAVENOUS
Status: DISCONTINUED | OUTPATIENT
Start: 2021-01-18 | End: 2021-01-18 | Stop reason: HOSPADM

## 2021-01-18 RX ORDER — SODIUM CHLORIDE, SODIUM LACTATE, POTASSIUM CHLORIDE, CALCIUM CHLORIDE 600; 310; 30; 20 MG/100ML; MG/100ML; MG/100ML; MG/100ML
9 INJECTION, SOLUTION INTRAVENOUS CONTINUOUS
Status: DISCONTINUED | OUTPATIENT
Start: 2021-01-18 | End: 2021-01-18 | Stop reason: HOSPADM

## 2021-01-18 RX ORDER — MIDAZOLAM HYDROCHLORIDE 1 MG/ML
1 INJECTION INTRAMUSCULAR; INTRAVENOUS
Status: DISCONTINUED | OUTPATIENT
Start: 2021-01-18 | End: 2021-01-18 | Stop reason: HOSPADM

## 2021-01-18 RX ADMIN — PROPOFOL 10 MG: 10 INJECTION, EMULSION INTRAVENOUS at 08:09

## 2021-01-18 RX ADMIN — PROPOFOL 50 MG: 10 INJECTION, EMULSION INTRAVENOUS at 07:43

## 2021-01-18 RX ADMIN — SODIUM CHLORIDE, POTASSIUM CHLORIDE, SODIUM LACTATE AND CALCIUM CHLORIDE 9 ML/HR: 600; 310; 30; 20 INJECTION, SOLUTION INTRAVENOUS at 07:03

## 2021-01-18 RX ADMIN — FENTANYL CITRATE 50 MCG: 50 INJECTION INTRAMUSCULAR; INTRAVENOUS at 07:51

## 2021-01-18 RX ADMIN — PROPOFOL 10 MG: 10 INJECTION, EMULSION INTRAVENOUS at 07:57

## 2021-01-18 RX ADMIN — FAMOTIDINE 20 MG: 10 INJECTION INTRAVENOUS at 07:02

## 2021-01-18 RX ADMIN — PROPOFOL 50 MG: 10 INJECTION, EMULSION INTRAVENOUS at 07:45

## 2021-01-18 RX ADMIN — PROPOFOL 10 MG: 10 INJECTION, EMULSION INTRAVENOUS at 07:49

## 2021-01-18 RX ADMIN — FENTANYL CITRATE 50 MCG: 50 INJECTION INTRAMUSCULAR; INTRAVENOUS at 07:45

## 2021-01-18 RX ADMIN — MIDAZOLAM 2 MG: 1 INJECTION INTRAMUSCULAR; INTRAVENOUS at 07:41

## 2021-01-18 RX ADMIN — MIDAZOLAM 1 MG: 1 INJECTION INTRAMUSCULAR; INTRAVENOUS at 07:02

## 2021-01-18 RX ADMIN — PROPOFOL 10 MG: 10 INJECTION, EMULSION INTRAVENOUS at 07:53

## 2021-01-18 NOTE — ANESTHESIA POSTPROCEDURE EVALUATION
"Patient: Silvia Zabala    Procedure Summary     Date: 01/18/21 Room / Location: [facility] OR [address] [place] [address] MAIN OR    Anesthesia Start: 0739 Anesthesia Stop: 0819    Procedure: EXCISION OF LABIAL CYST (Left Vulva) Diagnosis:     Surgeon: Melinda Thakkar MD Provider: Adam Lucas MD    Anesthesia Type: MAC ASA Status: 4          Anesthesia Type: MAC    Vitals  Vitals Value Taken Time   /68 01/18/21 0900   Temp 36.4 °C (97.5 °F) 01/18/21 0821   Pulse 83 01/18/21 0845   Resp 16 01/18/21 0845   SpO2 94 % 01/18/21 0902   Vitals shown include unvalidated device data.        Post Anesthesia Care and Evaluation    Pain management: adequate  Airway patency: patent  Anesthetic complications: No anesthetic complications    Cardiovascular status: acceptable  Respiratory status: acceptable  Hydration status: acceptable    Comments: /71   Pulse 83   Temp 36.4 °C (97.5 °F) (Oral)   Resp 16   Ht 170.2 cm (67\")   Wt 80.2 kg (176 lb 11.2 oz)   SpO2 95%   BMI 27.68 kg/m²         "

## 2021-01-18 NOTE — ANESTHESIA PREPROCEDURE EVALUATION
Anesthesia Evaluation     history of anesthetic complications: PONV               Airway   Mallampati: II  TM distance: >3 FB  Neck ROM: full  No difficulty expected  Dental      Pulmonary    (+) a smoker Former, sleep apnea,   Cardiovascular     Rhythm: regular  Rate: normal    (+) valvular problems/murmurs MVP, hyperlipidemia,       Neuro/Psych  (+) seizures, TIA, headaches, numbness, psychiatric history Anxiety and Depression,     GI/Hepatic/Renal/Endo    (+)  hiatal hernia, PUD, GI bleeding , hepatitis (DUKES), liver disease (Cirrhosis) fatty liver disease, diabetes mellitus type 1 poorly controlled using insulin,     Musculoskeletal     Abdominal    Substance History      OB/GYN          Other   arthritis, autoimmune disease rheumatoid arthritis, blood dyscrasia thrombocytopenia,                   Anesthesia Plan    ASA 4     MAC   (D/W pt. MAC and possible awareness intra op.  Pt understands MAC and GA are not the same and the possibility of GA being required for failed MAC)  intravenous induction     Anesthetic plan, all risks, benefits, and alternatives have been provided, discussed and informed consent has been obtained with: patient.

## 2021-01-22 LAB
DX PRELIMINARY: NORMAL
LAB AP CASE REPORT: NORMAL
LAB AP DIAGNOSIS COMMENT: NORMAL
PATH REPORT.FINAL DX SPEC: NORMAL
PATH REPORT.GROSS SPEC: NORMAL

## 2021-01-28 ENCOUNTER — TELEPHONE (OUTPATIENT)
Dept: HEPATOLOGY | Facility: CLINIC | Age: 61
End: 2021-01-28

## 2021-01-28 RX ORDER — INSULIN LISPRO 100 [IU]/ML
INJECTION, SOLUTION INTRAVENOUS; SUBCUTANEOUS
Qty: 30 ML | Refills: 1 | Status: SHIPPED | OUTPATIENT
Start: 2021-01-28 | End: 2021-01-29 | Stop reason: SDUPTHER

## 2021-01-29 ENCOUNTER — HOSPITAL ENCOUNTER (OUTPATIENT)
Dept: CARDIOLOGY | Facility: HOSPITAL | Age: 61
Discharge: HOME | End: 2021-01-29

## 2021-01-29 ENCOUNTER — TELEPHONE (OUTPATIENT)
Dept: GASTROENTEROLOGY | Facility: CLINIC | Age: 61
End: 2021-01-29

## 2021-01-29 ENCOUNTER — HOSPITAL ENCOUNTER (OUTPATIENT)
Dept: ULTRASOUND IMAGING | Facility: HOSPITAL | Age: 61
Discharge: HOME | End: 2021-01-29

## 2021-01-29 ENCOUNTER — OFFICE VISIT (OUTPATIENT)
Dept: INTERNAL MEDICINE | Age: 61
End: 2021-01-29

## 2021-01-29 DIAGNOSIS — E78.5 HYPERLIPIDEMIA, UNSPECIFIED HYPERLIPIDEMIA TYPE: ICD-10-CM

## 2021-01-29 DIAGNOSIS — K74.69 OTHER CIRRHOSIS OF LIVER (HCC): ICD-10-CM

## 2021-01-29 DIAGNOSIS — F32.A ANXIETY AND DEPRESSION: ICD-10-CM

## 2021-01-29 DIAGNOSIS — R56.9 SEIZURE (HCC): ICD-10-CM

## 2021-01-29 DIAGNOSIS — F41.9 ANXIETY AND DEPRESSION: ICD-10-CM

## 2021-01-29 DIAGNOSIS — IMO0002 TYPE 2 DIABETES MELLITUS, UNCONTROLLED, WITH NEUROPATHY: ICD-10-CM

## 2021-01-29 DIAGNOSIS — D61.818 PANCYTOPENIA (HCC): ICD-10-CM

## 2021-01-29 DIAGNOSIS — Z76.89 ENCOUNTER TO ESTABLISH CARE WITH NEW DOCTOR: Primary | ICD-10-CM

## 2021-01-29 DIAGNOSIS — Z95.828 S/P TIPS (TRANSJUGULAR INTRAHEPATIC PORTOSYSTEMIC SHUNT): ICD-10-CM

## 2021-01-29 LAB
BH CV VAS HEPATIC PORTAL VEIN DIAMETER: 1.63 CM
BH CV VAS SMA HEPATIC EDV: 24 CM/S
BH CV VAS SMA HEPATIC PSV: 107 CM/S
BH CV VAS SMA SPLENIC EDV: 59 CM/S
BH CV VAS SMA SPLENIC PSV: 152 CM/S

## 2021-01-29 PROCEDURE — 93975 VASCULAR STUDY: CPT

## 2021-01-29 PROCEDURE — 99214 OFFICE O/P EST MOD 30 MIN: CPT | Performed by: NURSE PRACTITIONER

## 2021-01-29 PROCEDURE — 76705 ECHO EXAM OF ABDOMEN: CPT

## 2021-01-29 RX ORDER — FLASH GLUCOSE SENSOR
1 KIT MISCELLANEOUS
Qty: 2 EACH | Refills: 3 | Status: SHIPPED | OUTPATIENT
Start: 2021-01-29 | End: 2022-02-08

## 2021-01-29 RX ORDER — LEVETIRACETAM 500 MG/1
500 TABLET ORAL 2 TIMES DAILY
Qty: 24 TABLET | Refills: 0 | Status: SHIPPED | OUTPATIENT
Start: 2021-01-29 | End: 2021-02-08

## 2021-01-29 RX ORDER — INSULIN LISPRO 100 [IU]/ML
INJECTION, SOLUTION INTRAVENOUS; SUBCUTANEOUS
Qty: 30 ML | Refills: 0 | Status: SHIPPED | OUTPATIENT
Start: 2021-01-29 | End: 2021-04-06 | Stop reason: HOSPADM

## 2021-01-29 NOTE — PROGRESS NOTES
"[facility] INTERNAL MEDICINE  JOEL Sherman / 60 y.o. / female  01/29/2021      VITAL SIGNS     Visit Vitals  /68   Pulse 97   Temp 96.6 °F (35.9 °C) (Temporal)   Ht 170.2 cm (67\")   Wt 82.2 kg (181 lb 3.2 oz)   LMP  (LMP Unknown)   SpO2 99%   BMI 28.38 kg/m²       BP Readings from Last 3 Encounters:   01/31/21 138/68   01/18/21 139/68   01/15/21 146/85     Wt Readings from Last 3 Encounters:   01/29/21 82.2 kg (181 lb 3.2 oz)   01/18/21 80.2 kg (176 lb 11.2 oz)   01/15/21 82.4 kg (181 lb 9.6 oz)     Body mass index is 28.38 kg/m².      MEDICATIONS     Current Outpatient Medications   Medication Sig Dispense Refill   • ALPRAZolam (XANAX) 0.5 MG tablet Take 1 tablet by mouth 2 (Two) Times a Day As Needed for Anxiety. (Patient taking differently: Take 0.5 mg by mouth 2 (Two) Times a Day As Needed for Anxiety or Sleep.) 60 tablet 1   • Cholecalciferol (Vitamin D3) 25 MCG (1000 UT) capsule Take 1,000 Units by mouth Daily.     • cholestyramine (Questran) 4 g packet Take 2 packets by mouth 2 (Two) Times a Day With Meals.     • Continuous Blood Gluc Sensor (FreeStyle Roseline Sensor System) 1 Device Every 14 (Fourteen) Days. 2 each 3   • Docusate Sodium (COLACE PO) Take 2 capsules by mouth every night at bedtime.     • esomeprazole (nexIUM) 40 MG capsule Take 40 mg by mouth Every Morning Before Breakfast.     • ferrous sulfate 325 (65 FE) MG tablet TAKE ONE TABLET BY MOUTH TWICE A DAY (Patient taking differently: Take 325 mg by mouth 2 (two) times a day.) 60 tablet 2   • glucose blood test strip by Other route As Needed.     • hydrOXYzine (ATARAX) 25 MG tablet Take 2 tablets by mouth Every Night. 30 tablet 3   • Insulin Glargine (LANTUS SOLOSTAR) 100 UNIT/ML injection pen Inject 70 units in the am and 70 units in the pm (Patient taking differently: Inject 80 Units under the skin into the appropriate area as directed 2 (Two) Times a Day. Inject 120 units in the am and 120 units in the pm) 20 pen 1   • " Insulin Lispro, 1 Unit Dial, (HumaLOG KwikPen) 100 UNIT/ML solution pen-injector INJECT UP TO 60 UNITS THREE TIMES A DAY WITH  MEALS 30 mL 0   • levETIRAcetam (KEPPRA) 500 MG tablet Take 1 tablet by mouth 2 (Two) Times a Day. 24 tablet 0   • Magnesium Oxide 400 MG capsule Take 400 mg by mouth Every Night.     • metaxalone (SKELAXIN) 800 MG tablet Take 800 mg by mouth 3 (Three) Times a Day.     • metoclopramide (REGLAN) 5 MG tablet Take 5 mg by mouth 4 (Four) Times a Day Before Meals & at Bedtime.     • mirtazapine (REMERON) 15 MG tablet Take 15 mg by mouth Every Night.     • Multiple Vitamins-Minerals (MULTIVITAMIN WITH MINERALS) tablet tablet Take 1 tablet by mouth Daily.     • OxyCODONE HCl (OXAYDO) 7.5 MG tablet  Take 7.5 mg by mouth Every 8 (Eight) Hours As Needed.     • polyethylene glycol (MIRALAX) powder Take 17 g by mouth Daily As Needed.     • Prasterone (Intrarosa) 6.5 MG insert Insert 1 each into the vagina 2 (Two) Times a Week.     • pregabalin (LYRICA) 75 MG capsule Take 75 mg by mouth 2 (Two) Times a Day.     • sucralfate (CARAFATE) 1 GM/10ML suspension Take 1 g by mouth 3 (Three) Times a Day.     • vitamin B-12 (CYANOCOBALAMIN) 1000 MCG tablet Take 1,000 mcg by mouth Daily.     • XIFAXAN 550 MG tablet TAKE ONE TABLET BY MOUTH TWICE A DAY (Patient taking differently: Take 550 mg by mouth Every 12 (Twelve) Hours.) 60 tablet 2   • cholestyramine light 4 g powder Take 1 packet by mouth 2 (Two) Times a Day. (Patient taking differently: Take 8 g by mouth 2 (Two) Times a Day.) 60 packet 5   • furosemide (LASIX) 80 MG tablet Take 80 mg by mouth Daily As Needed (swelling).     • spironolactone (ALDACTONE) 100 MG tablet TAKE ONE TABLET BY MOUTH DAILY (Patient taking differently: Take 100 mg by mouth Daily As Needed (swelling).) 30 tablet 2     No current facility-administered medications for this visit.        CHIEF COMPLAINT     Establish Care, Diabetes Mellitus, Seizures, Anxiety, Depression, and  Hyperlipidemia      ASSESSMENT & PLAN     Problem List Items Addressed This Visit        Cardiac and Vasculature    Hyperlipidemia    Relevant Medications    cholestyramine light 4 g powder    cholestyramine (Questran) 4 g packet    Other Relevant Orders    Lipid Panel With / Chol / HDL Ratio       Endocrine and Metabolic    Type 2 diabetes mellitus, uncontrolled, with neuropathy (CMS/HCC) - Primary    Relevant Medications    Insulin Glargine (LANTUS SOLOSTAR) 100 UNIT/ML injection pen    Insulin Lispro, 1 Unit Dial, (HumaLOG KwikPen) 100 UNIT/ML solution pen-injector    Other Relevant Orders    MicroAlbumin, Urine, Random - Urine, Clean Catch    Hemoglobin A1c    Urinalysis without microscopic (no culture) - Urine, Clean Catch    TSH+Free T4       Hematology and Neoplasia    Pancytopenia (CMS/HCC)    Relevant Medications    ferrous sulfate 325 (65 FE) MG tablet    vitamin B-12 (CYANOCOBALAMIN) 1000 MCG tablet       Mental Health    Anxiety and depression    Relevant Medications    ALPRAZolam (XANAX) 0.5 MG tablet    mirtazapine (REMERON) 15 MG tablet    hydrOXYzine (ATARAX) 25 MG tablet    Other Relevant Orders    Ambulatory Referral to Psychiatry    Compliance Drug Analysis, Ur - Urine, Clean Catch    TSH+Free T4       Neuro    Seizure (CMS/[facility])        Orders Placed This Encounter   Procedures   • Compliance Drug Analysis, Ur - Urine, Clean Catch   • MicroAlbumin, Urine, Random - Urine, Clean Catch   • Lipid Panel With / Chol / HDL Ratio   • Hemoglobin A1c   • Urinalysis without microscopic (no culture) - Urine, Clean Catch   • TSH+Free T4   • Ambulatory Referral to Psychiatry     New Medications Ordered This Visit   Medications   • Continuous Blood Gluc Sensor (FreeStyle Roseline Sensor System)     Si Device Every 14 (Fourteen) Days.     Dispense:  2 each     Refill:  3     Pt needs to check 4 times a day, per sensor system to change every 14 days.   • Insulin Lispro, 1 Unit Dial, (HumaLOG KwikPen) 100 UNIT/ML  solution pen-injector     Sig: INJECT UP TO 60 UNITS THREE TIMES A DAY WITH  MEALS     Dispense:  30 mL     Refill:  0   • levETIRAcetam (KEPPRA) 500 MG tablet     Sig: Take 1 tablet by mouth 2 (Two) Times a Day.     Dispense:  24 tablet     Refill:  0       Summary/Discussion:  • Referral to psychiatry for anxiety/depression, therapy as needed.   • UDS, contract and ROMINA reviewed today until patient can establish care for xanax, and until determined if rheum or endo will supply lyrica.   • Instructed patient to call end as soon as possible to request appointment. Will refill humalog and sensor for limited supply at time.   • Refill keppra, patient has been out of medication.   • Continue current medication regimen pending lab updates today, along with follow-up with specialists.   • Plan for medicare annual wellness and chronic medical follow-up in three months.     Next Appointment with me: Visit date not found    Return in about 3 months (around 4/29/2021) for Medicare Wellness, Next scheduled follow up.    _____________________________________________________________________________________    HISTORY OF PRESENT ILLNESS     Patient presents to establish care with office and provider. Many specialists involved in patient care.     Hematology and Oncology: Dr. Mcdowell manages pancytopenia. Patient often receives blood, iron, and platelet infusions as need. On daily iron supplementation.     Rheumatology: Dr. Munoz manages fibromyalgia, rheumatoid arthritis, and systemic lupus. Last seen by physician assistant in may of 2020. On daily lyrica.    Pain Management: followed by [facility] for lumbar degenerative disc disease. Currently taking kendal 5mg while waiting for prior auth for oxaydo 7.5mg.     Muscle cramps: occurring daily. Prior family physician prescribed skelaxin TID and magnesium supplement. Controlled with this regimen.     Hyperlipidemia/dyslipidemia: last checked May 2019, elevated  triglycerides. Treatment with cholestyramine 8 grams for both treatment of HLD and itching with DUKES.     Petit mal seizure: started on keppra 500mg daily by previous family physician. NO seizure since.     Gynecology: Followed by Dr. Dallas removal of bartholin cyst. Atrophic vaginitis treated with prasterone  Vaginal insert. Manages DEXA, mammogram, and pelvic exam if needed (hysterectomy in 1986).     Endocrinology: manages type II diabetes. Current treatment with lantus 80 untis BID, and sliding scale humalog. Patient monitor blood sugar with freestyle jesusita sensor. Unfortunately, she missed appointment and has been out of humalog for almost a week. Patient stated endo office refused refill until office visit. She has since been taking 120 BID of lantus. Blood sugars were around 250-80 with sliding scale and basal, however, have reached in the 350s-400s in the last week without rapid insulin.  Patient also has diabetic neuropathy which is controlled with lyrica used for fibromyalgia.     Anxiety and Depression: no psychiatrist at present. Family medicine was following. Xanax 0.5mg twice daily as needed for anxiety/insomnia, along with daily remeron. Patient is acceptable and would like referral to psychiatry for therapy along with medication treatment.     Gastroenterology: DUKES and gastroparesis, consistent nausea and intermittent vomitting. Patient stated mother was also diagnosed. Lived in [place] over toxic waste according to patient. Followed closely by Dr. Gray. Had vascular US liver today to evaluate stent from TIP procedure. Lasix and spironolactone were prescribed by gastro for swelling as needed. Hydroxyzine for itching per gastro, along with cholest packet. Also is given daily nexium and reglan regimen. She is currently also taking carafate. Xifaxin for bowel movements as she is unable to take lactulose. Colace every day for BM and miralax as needed. She is currently in need of a transplant  with Dr. Montes on board [facility].     Patient Care Team:  Kulwant Martínez APRN as PCP - General (Internal Medicine)  Sukhdeep Mcdowell MD as Consulting Physician (Hematology and Oncology)  Blanca Pitts MD as Referring Physician (Internal Medicine)  Merary Burnett MD as Consulting Physician (Endocrinology)  Kervin Rajput Jr., MD as Consulting Physician (Hematology and Oncology)  Kimberly Gray MD as Consulting Physician (Gastroenterology)  Joe Munoz MD as Consulting Physician (Rheumatology)  Melinda Thakkar MD as Consulting Physician (Obstetrics and Gynecology)      REVIEW OF SYSTEMS     Review of Systems  Constitutional neg except per HPI   Resp neg  CV neg    PHYSICAL EXAMINATION     Physical Exam   Constitutional  No distress  Cardiovascular Rate  normal . Rhythm: regular . Heart sounds:  normal  Pulmonary/Chest  Effort normal. Breath sounds:  normal  Psychiatric  Alert. Judgment and thought content normal. Mood normal     REVIEWED DATA     Labs:     Lab Results   Component Value Date     (L) 01/15/2021    K 4.1 01/15/2021    CALCIUM 9.4 01/15/2021    AST 34 (H) 01/15/2021    ALT 23 01/15/2021    BUN 19 01/15/2021    CREATININE 0.84 01/15/2021    CREATININE 0.77 01/15/2021    CREATININE 0.86 12/04/2020    EGFRIFNONA 70 01/15/2021    EGFRIFAFRI 73 05/06/2019       Lab Results   Component Value Date    HGBA1C 10.30 (H) 07/01/2019    HGBA1C 8.50 (H) 05/06/2019    HGBA1C 11.1 (H) 02/14/2019    GLU 75 05/06/2019     (C) 01/29/2019     (H) 08/09/2018    MICROALBUR <1.2 12/15/2018       Lab Results   Component Value Date    LDL 67 05/11/2019     (H) 05/06/2019    LDL 91 01/29/2019    HDL 34 (L) 05/11/2019    HDL 82 (H) 05/06/2019    HDL 78 (H) 01/29/2019    TRIG 159 (H) 05/11/2019    TRIG 76 05/06/2019    TRIG 59 01/29/2019       Lab Results   Component Value Date    TSH 1.880 07/01/2019    FREET4 1.08 07/01/2019       Lab Results   Component Value Date     "WBC 3.93 01/15/2021    HGB 11.9 (L) 01/15/2021    HGB 11.8 (L) 01/15/2021    HGB 9.5 (L) 12/04/2020    PLT 80 (L) 01/15/2021       Lab Results   Component Value Date    GLUCOSEU >=1000 mg/dL (3+) (A) 05/09/2019    BLOODU Negative 05/09/2019    NITRITEU Negative 05/09/2019    LEUKOCYTESUR Negative 05/09/2019       Imaging:         Medical Tests:         Summary of old records / correspondence / consultant report:         Request outside records:         ALLERGIES  Allergies   Allergen Reactions   • Albuterol Anaphylaxis   • Imitrex [Sumatriptan] Anaphylaxis   • Tramadol Nausea Only, Other (See Comments) and GI Intolerance     Per pt causes her \"palsy, meaning shaking and tremors\"    • Nsaids Other (See Comments)     Told by MD not to take due to liver problems   • Tylenol [Acetaminophen] Other (See Comments)     Has liver problems and told by MD to not take   • Zofran [Ondansetron Hcl] Other (See Comments)     Pt states does not work to correct nausea and vomiting.    • Lactulose Nausea And Vomiting and Other (See Comments)     Severe abdominal pain   • Quinine Derivatives Nausea And Vomiting        PFSH:     The following portions of the patient's history were reviewed and updated as appropriate: Allergies / Current Medications / Past Medical History / Surgical History / Social History / Family History    PROBLEM LIST   Patient Active Problem List   Diagnosis   • Cirrhosis of liver (CMS/HCC)   • Rheumatoid arthritis (CMS/HCC)   • Anxiety and depression   • Type 2 diabetes mellitus, uncontrolled, with neuropathy (CMS/HCC)   • Fibrositis   • Change in blood platelet count   • Pancytopenia (CMS/HCC)   • Hypersplenism   • Nausea & vomiting   • DUKES (nonalcoholic steatohepatitis)   • Hyperlipidemia   • Abdominal pain   • Hematemesis   • Ascites   • Portal hypertension (CMS/HCC)   • Systemic lupus (CMS/HCC)   • Secondary esophageal varices without bleeding (CMS/HCC)   • Esophageal varices with bleeding (CMS/HCC)   • " Gastroparesis   • Cirrhosis of liver with ascites (CMS/HCC)   • Diabetic ketoacidosis without coma associated with type 2 diabetes mellitus (CMS/HCC)   • Diabetic peripheral neuropathy (CMS/HCC)   • History of seizure disorder   • Hepatic encephalopathy (CMS/HCC)   • Thrombocytopenia (CMS/HCC)   • Fever   • Acute ITP (CMS/HCC)   • Degeneration of lumbosacral intervertebral disc   • Seizure (CMS/HCC)   • Spondylosis without myelopathy   • Intractable vomiting with nausea   • UGI bleed   • Migraine without aura   • Urinary retention   • Uncontrolled diabetes mellitus with hyperglycemia (CMS/HCC)   • BRICE (obstructive sleep apnea)   • Gastroparesis   • Hyperammonemia (CMS/HCC)   • Hyponatremia   • Anemia   • Vitamin D insufficiency   • Hyperglycemia   • Dehydration   • Iron deficiency anemia   • Adverse effect of iron or its compound, subsequent encounter   • Hemorrhage of anus and rectum   • Vulvar lesion   • Acute upper GI bleed   • Acute blood loss anemia   • Acute hyperkalemia   • Labial cyst       PAST MEDICAL HISTORY  Past Medical History:   Diagnosis Date   • Anemia    • Anxiety    • Arthritis    • Broken shoulder     left shoulder    • Chromosomal abnormality     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   • Cirrhosis (CMS/HCC)    • Clostridium difficile infection    • Colon polyps    • Depression    • Diabetes mellitus (CMS/HCC)     Adult onset, insulin requiring   • Duodenal ulcer with hemorrhage    • Esophageal varices determined by endoscopy (CMS/HCC)    • Fibromyalgia    • Gallbladder disease    • Gastric varices    • Gastroparesis    • Glaucoma    • Granuloma annulare    • H/O B12 deficiency    • H/O Bleeding ulcer     And gastroesophageal varices   • H/O mixed connective tissue disorder    • Hemorrhoids    • Hiatal hernia    • History of transfusion     needs bendryl  flushes sensation and temp goes up   • Hx of being hospitalized      In [place] for GI bleeding from ulcer and varices   • Hyperlipidemia    • Migraine    • Mitral valve prolapse    • DUKES (nonalcoholic steatohepatitis) 11/2016   • Orthostatic hypotension 02/2019   • BRICE (obstructive sleep apnea)    • Pancreas divisum    • Pancytopenia (CMS/HCC)     Chronic, due to cirrhosis and hypersplenism   • Peptic ulcer disease     And esophageal varices   • PONV (postoperative nausea and vomiting)    • RA (rheumatoid arthritis) (CMS/HCC)    • Seizure disorder (CMS/HCC)     last 2003   • Seizures (CMS/HCC)    • Systemic lupus (CMS/HCC)    • TIA (transient ischemic attack) 1984       SURGICAL HISTORY  Past Surgical History:   Procedure Laterality Date   • BARTHOLIN CYST MARSUPIALIZATION Left 1/18/2021    Procedure: EXCISION OF LABIAL CYST;  Surgeon: Melinda Thakkar MD;  Location: [facility] MAIN OR;  Service: Obstetrics/Gynecology;  Laterality: Left;   • BLADDER REPAIR  1991   • CATARACT EXTRACTION     • CHOLECYSTECTOMY  1994   • COLONOSCOPY     • ENDOSCOPY N/A 11/7/2016    Procedure: ESOPHAGOGASTRODUODENOSCOPY WITH COLD POLYPECTOMY, BANDING,  AND CLIPS TIMES 2;  Surgeon: Rich Coleman MD;  Location: [facility] ENDOSCOPY;  Service:    • ENDOSCOPY N/A 12/22/2016    Procedure: ESOPHAGOGASTRODUODENOSCOPY WITH ESOPHAGEAL BANDING. 5 BANDS FIRED, 4 BANDS ADHERERD TO MUCOSA;  Surgeon: Rcih Coleman MD;  Location: [facility] ENDOSCOPY;  Service:    • ENDOSCOPY N/A 2/15/2017    Procedure: ESOPHAGOGASTRODUODENOSCOPY AT BEDSIDE with esophageal varices banding (3);  Surgeon: Rich Coleman MD;  Location: [facility] ENDOSCOPY;  Service:    • ENDOSCOPY N/A 4/6/2017    Procedure: ESOPHAGOGASTRODUODENOSCOPY WITH ESOPHAGEAL VARICES BANDING x2;  Surgeon: Rich Coleman MD;  Location: [facility] ENDOSCOPY;  Service:    • ENDOSCOPY N/A 11/24/2017    Procedure: ESOPHAGOGASTRODUODENOSCOPY with biopsies;  Surgeon: Rich Coleman MD;  Location: [facility] ENDOSCOPY;  Service:    • ENDOSCOPY N/A 10/5/2018    Procedure:  "ESOPHAGOGASTRODUODENOSCOPY;  Surgeon: Rich Coleman MD;  Location: [facility] ENDOSCOPY;  Service: Gastroenterology   • ENDOSCOPY N/A 5/10/2019    Procedure: ESOPHAGOGASTRODUODENOSCOPY AT BEDSIDE WITH VARICEAL LIGATION;  Surgeon: Rich Coleman MD;  Location: [facility] ENDOSCOPY;  Service: Gastroenterology   • ENDOSCOPY N/A 2019    Procedure: ESOPHAGOGASTRODUODENOSCOPY WITH ESOPHAGEAL BANDING (3 BANDS);  Surgeon: Rich Coleman MD;  Location: [facility] ENDOSCOPY;  Service: Gastroenterology   • ENDOSCOPY AND COLONOSCOPY      Dr. Rich Coleman with findings of candida esophagitis   • EYE SURGERY     • HYSTERECTOMY  1986    partial   • JOINT REPLACEMENT     • KNEE SURGERY Right     \"right knee recontstruction\"   • LIVER BIOPSY  2015   • MASS EXCISION     • STOMACH SURGERY     • TIPS PROCEDURE  2020    x2       SOCIAL HISTORY  Social History     Socioeconomic History   • Marital status:      Spouse name: Jose   • Number of children: Not on file   • Years of education: Not on file   • Highest education level: Not on file   Occupational History     Employer: DISABLED   Tobacco Use   • Smoking status: Former Smoker     Packs/day: 1.00     Years: 4.00     Pack years: 4.00     Types: Cigarettes     Quit date: 2015     Years since quittin.1   • Smokeless tobacco: Never Used   Substance and Sexual Activity   • Alcohol use: No   • Drug use: Never   • Sexual activity: Defer   Social History Narrative    Moved to [place] from [place]. She is currently medically disabled.       FAMILY HISTORY  Family History   Problem Relation Age of Onset   • Liver disease Other    • Depression Other    • Heart disease Other    • Hypertension Other    • Diabetes Other    • Breast cancer Other    • Brain cancer Other    • Uterine cancer Other    • Colon cancer Other    • Leukemia Other    • Colon cancer Maternal Aunt    • Hypertension Mother    • Diabetes type II Mother    • Rheum arthritis Mother    • Liver " disease Mother         DUKES   • Heart disease Father    • Diabetes type II Father    • Diabetes type II Sister    • Lupus Sister    • Malig Hyperthermia Neg Hx          Examiner was wearing KN95 mask, face shield and exam gloves during the entire duration of the visit. Patient was masked the entire time.   Minimum social distance of 6 ft maintained entire visit except if physical contact was necessary as documented.     **Dragon Disclaimer:   Much of this encounter note is an electronic transcription/translation of spoken language to printed text. The electronic translation of spoken language may permit erroneous, or at times, nonsensical words or phrases to be inadvertently transcribed. Although I have reviewed the note for such errors, some may still exist.

## 2021-01-31 VITALS
TEMPERATURE: 96.6 F | SYSTOLIC BLOOD PRESSURE: 138 MMHG | OXYGEN SATURATION: 99 % | HEART RATE: 97 BPM | HEIGHT: 67 IN | DIASTOLIC BLOOD PRESSURE: 68 MMHG | BODY MASS INDEX: 28.44 KG/M2 | WEIGHT: 181.2 LBS

## 2021-01-31 PROBLEM — R79.9 ABNORMAL FINDING OF BLOOD CHEMISTRY: Status: RESOLVED | Noted: 2018-05-08 | Resolved: 2021-01-31

## 2021-02-01 ENCOUNTER — TELEPHONE (OUTPATIENT)
Dept: GASTROENTEROLOGY | Facility: CLINIC | Age: 61
End: 2021-02-01

## 2021-02-03 ENCOUNTER — APPOINTMENT (OUTPATIENT)
Dept: CT IMAGING | Facility: HOSPITAL | Age: 61
End: 2021-02-03

## 2021-02-03 ENCOUNTER — HOSPITAL ENCOUNTER (OUTPATIENT)
Facility: HOSPITAL | Age: 61
Setting detail: OBSERVATION
LOS: 1 days | Discharge: HOME | End: 2021-02-07
Attending: EMERGENCY MEDICINE | Admitting: INTERNAL MEDICINE

## 2021-02-03 ENCOUNTER — APPOINTMENT (OUTPATIENT)
Dept: GENERAL RADIOLOGY | Facility: HOSPITAL | Age: 61
End: 2021-02-03

## 2021-02-03 DIAGNOSIS — R10.9 ABDOMINAL PAIN, UNSPECIFIED ABDOMINAL LOCATION: ICD-10-CM

## 2021-02-03 DIAGNOSIS — K29.70 GASTRITIS WITHOUT BLEEDING, UNSPECIFIED CHRONICITY, UNSPECIFIED GASTRITIS TYPE: ICD-10-CM

## 2021-02-03 DIAGNOSIS — R73.9 HYPERGLYCEMIA: ICD-10-CM

## 2021-02-03 DIAGNOSIS — R11.2 NAUSEA AND VOMITING, INTRACTABILITY OF VOMITING NOT SPECIFIED, UNSPECIFIED VOMITING TYPE: Primary | ICD-10-CM

## 2021-02-03 DIAGNOSIS — K74.60 CIRRHOSIS OF LIVER WITHOUT ASCITES, UNSPECIFIED HEPATIC CIRRHOSIS TYPE (HCC): ICD-10-CM

## 2021-02-03 PROBLEM — A49.9 UTI (URINARY TRACT INFECTION), BACTERIAL: Status: ACTIVE | Noted: 2021-02-03

## 2021-02-03 PROBLEM — E80.6 HYPERBILIRUBINEMIA: Status: ACTIVE | Noted: 2021-02-03

## 2021-02-03 PROBLEM — N39.0 UTI (URINARY TRACT INFECTION): Status: ACTIVE | Noted: 2021-02-03

## 2021-02-03 PROBLEM — K29.00 ACUTE GASTRITIS: Status: ACTIVE | Noted: 2021-02-03

## 2021-02-03 PROBLEM — N39.0 UTI (URINARY TRACT INFECTION), BACTERIAL: Status: ACTIVE | Noted: 2021-02-03

## 2021-02-03 PROBLEM — R74.01 TRANSAMINITIS: Status: ACTIVE | Noted: 2021-02-03

## 2021-02-03 LAB
ALBUMIN SERPL-MCNC: 4.2 G/DL (ref 3.5–5.2)
ALBUMIN/GLOB SERPL: 1.6 G/DL
ALP SERPL-CCNC: 186 U/L (ref 39–117)
ALT SERPL W P-5'-P-CCNC: 40 U/L (ref 1–33)
AMMONIA BLD-SCNC: 31 UMOL/L (ref 11–51)
ANION GAP SERPL CALCULATED.3IONS-SCNC: 12.3 MMOL/L (ref 5–15)
APPEARANCE UR: CLEAR
APTT PPP: 27.8 SECONDS (ref 22.7–35.4)
AST SERPL-CCNC: 38 U/L (ref 1–32)
B PARAPERT DNA SPEC QL NAA+PROBE: NOT DETECTED
B PERT DNA SPEC QL NAA+PROBE: NOT DETECTED
B-OH-BUTYR SERPL-SCNC: 0.36 MMOL/L (ref 0.02–0.27)
BACTERIA UR QL AUTO: ABNORMAL /HPF
BASOPHILS # BLD AUTO: 0.06 10*3/MM3 (ref 0–0.2)
BASOPHILS NFR BLD AUTO: 1 % (ref 0–1.5)
BILIRUB SERPL-MCNC: 2.2 MG/DL (ref 0–1.2)
BILIRUB UR QL STRIP: NEGATIVE
BILIRUB UR QL STRIP: NEGATIVE
BUN SERPL-MCNC: 21 MG/DL (ref 6–20)
BUN/CREAT SERPL: 24.7 (ref 7–25)
C PNEUM DNA NPH QL NAA+NON-PROBE: NOT DETECTED
CALCIUM SPEC-SCNC: 9.7 MG/DL (ref 8.6–10.5)
CHLORIDE SERPL-SCNC: 96 MMOL/L (ref 98–107)
CHOLEST SERPL-MCNC: 173 MG/DL (ref 100–199)
CHOLEST/HDLC SERPL: 1.9 RATIO (ref 0–4.4)
CLARITY UR: CLEAR
CO2 SERPL-SCNC: 22.7 MMOL/L (ref 22–29)
COLOR UR: YELLOW
COLOR UR: YELLOW
CREAT SERPL-MCNC: 0.85 MG/DL (ref 0.57–1)
DEPRECATED RDW RBC AUTO: 52.1 FL (ref 37–54)
DRUGS UR: NORMAL
EOSINOPHIL # BLD AUTO: 0.01 10*3/MM3 (ref 0–0.4)
EOSINOPHIL NFR BLD AUTO: 0.2 % (ref 0.3–6.2)
ERYTHROCYTE [DISTWIDTH] IN BLOOD BY AUTOMATED COUNT: 16.2 % (ref 12.3–15.4)
FLUAV SUBTYP SPEC NAA+PROBE: NOT DETECTED
FLUBV RNA ISLT QL NAA+PROBE: NOT DETECTED
GFR SERPL CREATININE-BSD FRML MDRD: 69 ML/MIN/1.73
GLOBULIN UR ELPH-MCNC: 2.7 GM/DL
GLUCOSE BLDC GLUCOMTR-MCNC: 309 MG/DL (ref 70–130)
GLUCOSE BLDC GLUCOMTR-MCNC: 310 MG/DL (ref 70–130)
GLUCOSE BLDC GLUCOMTR-MCNC: 318 MG/DL (ref 70–130)
GLUCOSE BLDC GLUCOMTR-MCNC: 337 MG/DL (ref 70–130)
GLUCOSE SERPL-MCNC: 298 MG/DL (ref 65–99)
GLUCOSE UR QL: ABNORMAL
GLUCOSE UR STRIP-MCNC: ABNORMAL MG/DL
HADV DNA SPEC NAA+PROBE: NOT DETECTED
HBA1C MFR BLD: 8.8 % (ref 4.8–5.6)
HCOV 229E RNA SPEC QL NAA+PROBE: NOT DETECTED
HCOV HKU1 RNA SPEC QL NAA+PROBE: NOT DETECTED
HCOV NL63 RNA SPEC QL NAA+PROBE: NOT DETECTED
HCOV OC43 RNA SPEC QL NAA+PROBE: NOT DETECTED
HCT VFR BLD AUTO: 37.4 % (ref 34–46.6)
HDLC SERPL-MCNC: 89 MG/DL
HGB BLD-MCNC: 13.2 G/DL (ref 12–15.9)
HGB UR QL STRIP.AUTO: ABNORMAL
HGB UR QL STRIP: NEGATIVE
HMPV RNA NPH QL NAA+NON-PROBE: NOT DETECTED
HOLD SPECIMEN: NORMAL
HOLD SPECIMEN: NORMAL
HPIV1 RNA SPEC QL NAA+PROBE: NOT DETECTED
HPIV2 RNA SPEC QL NAA+PROBE: NOT DETECTED
HPIV3 RNA NPH QL NAA+PROBE: NOT DETECTED
HPIV4 P GENE NPH QL NAA+PROBE: NOT DETECTED
HYALINE CASTS UR QL AUTO: ABNORMAL /LPF
IMM GRANULOCYTES # BLD AUTO: 0.08 10*3/MM3 (ref 0–0.05)
IMM GRANULOCYTES NFR BLD AUTO: 1.3 % (ref 0–0.5)
INR PPP: 1.15 (ref 0.9–1.1)
KETONES UR QL STRIP: NEGATIVE
KETONES UR QL STRIP: NEGATIVE
LDLC SERPL CALC-MCNC: 63 MG/DL (ref 0–99)
LEUKOCYTE ESTERASE UR QL STRIP.AUTO: ABNORMAL
LEUKOCYTE ESTERASE UR QL STRIP: NEGATIVE
LIPASE SERPL-CCNC: 14 U/L (ref 13–60)
LYMPHOCYTES # BLD AUTO: 0.74 10*3/MM3 (ref 0.7–3.1)
LYMPHOCYTES NFR BLD AUTO: 12.1 % (ref 19.6–45.3)
M PNEUMO IGG SER IA-ACNC: NOT DETECTED
MAGNESIUM SERPL-MCNC: 2 MG/DL (ref 1.6–2.6)
MCH RBC QN AUTO: 31.2 PG (ref 26.6–33)
MCHC RBC AUTO-ENTMCNC: 35.3 G/DL (ref 31.5–35.7)
MCV RBC AUTO: 88.4 FL (ref 79–97)
MICROALBUMIN UR-MCNC: 146.4 UG/ML
MONOCYTES # BLD AUTO: 0.54 10*3/MM3 (ref 0.1–0.9)
MONOCYTES NFR BLD AUTO: 8.8 % (ref 5–12)
NEUTROPHILS NFR BLD AUTO: 4.7 10*3/MM3 (ref 1.7–7)
NEUTROPHILS NFR BLD AUTO: 76.6 % (ref 42.7–76)
NITRITE UR QL STRIP: NEGATIVE
NITRITE UR QL STRIP: NEGATIVE
NRBC BLD AUTO-RTO: 0 /100 WBC (ref 0–0.2)
PH UR STRIP.AUTO: 5.5 [PH] (ref 5–8)
PH UR STRIP: 6 [PH] (ref 5–7.5)
PHOSPHATE SERPL-MCNC: 2.7 MG/DL (ref 2.5–4.5)
PLATELET # BLD AUTO: 87 10*3/MM3 (ref 140–450)
PMV BLD AUTO: 11.3 FL (ref 6–12)
POTASSIUM SERPL-SCNC: 3.7 MMOL/L (ref 3.5–5.2)
PROT SERPL-MCNC: 6.9 G/DL (ref 6–8.5)
PROT UR QL STRIP: ABNORMAL
PROT UR QL STRIP: ABNORMAL
PROTHROMBIN TIME: 14.5 SECONDS (ref 11.7–14.2)
QT INTERVAL: 393 MS
RBC # BLD AUTO: 4.23 10*6/MM3 (ref 3.77–5.28)
RBC # UR: ABNORMAL /HPF
REF LAB TEST METHOD: ABNORMAL
RHINOVIRUS RNA SPEC NAA+PROBE: NOT DETECTED
RSV RNA NPH QL NAA+NON-PROBE: NOT DETECTED
SARS-COV-2 RNA NPH QL NAA+NON-PROBE: NOT DETECTED
SODIUM SERPL-SCNC: 131 MMOL/L (ref 136–145)
SP GR UR STRIP: 1.01 (ref 1–1.03)
SP GR UR: 1.02 (ref 1–1.03)
SQUAMOUS #/AREA URNS HPF: ABNORMAL /HPF
T4 FREE SERPL-MCNC: 1.08 NG/DL (ref 0.82–1.77)
TRIGL SERPL-MCNC: 122 MG/DL (ref 0–149)
TROPONIN T SERPL-MCNC: <0.01 NG/ML (ref 0–0.03)
TSH SERPL DL<=0.005 MIU/L-ACNC: 5.39 UIU/ML (ref 0.45–4.5)
UROBILINOGEN UR QL STRIP: ABNORMAL
UROBILINOGEN UR STRIP-MCNC: 0.2 MG/DL (ref 0.2–1)
VLDLC SERPL CALC-MCNC: 21 MG/DL (ref 5–40)
WBC # BLD AUTO: 6.13 10*3/MM3 (ref 3.4–10.8)
WBC UR QL AUTO: ABNORMAL /HPF
WHOLE BLOOD HOLD SPECIMEN: NORMAL
WHOLE BLOOD HOLD SPECIMEN: NORMAL

## 2021-02-03 PROCEDURE — 82962 GLUCOSE BLOOD TEST: CPT

## 2021-02-03 PROCEDURE — 83735 ASSAY OF MAGNESIUM: CPT | Performed by: PHYSICIAN ASSISTANT

## 2021-02-03 PROCEDURE — 93010 ELECTROCARDIOGRAM REPORT: CPT | Performed by: INTERNAL MEDICINE

## 2021-02-03 PROCEDURE — 99285 EMERGENCY DEPT VISIT HI MDM: CPT

## 2021-02-03 PROCEDURE — 25010000002 MORPHINE PER 10 MG: Performed by: EMERGENCY MEDICINE

## 2021-02-03 PROCEDURE — G0378 HOSPITAL OBSERVATION PER HR: HCPCS

## 2021-02-03 PROCEDURE — 71045 X-RAY EXAM CHEST 1 VIEW: CPT

## 2021-02-03 PROCEDURE — 85610 PROTHROMBIN TIME: CPT | Performed by: EMERGENCY MEDICINE

## 2021-02-03 PROCEDURE — 0202U NFCT DS 22 TRGT SARS-COV-2: CPT | Performed by: EMERGENCY MEDICINE

## 2021-02-03 PROCEDURE — 83690 ASSAY OF LIPASE: CPT | Performed by: PHYSICIAN ASSISTANT

## 2021-02-03 PROCEDURE — 85025 COMPLETE CBC W/AUTO DIFF WBC: CPT | Performed by: PHYSICIAN ASSISTANT

## 2021-02-03 PROCEDURE — 82010 KETONE BODYS QUAN: CPT | Performed by: PHYSICIAN ASSISTANT

## 2021-02-03 PROCEDURE — 74177 CT ABD & PELVIS W/CONTRAST: CPT

## 2021-02-03 PROCEDURE — 84484 ASSAY OF TROPONIN QUANT: CPT | Performed by: PHYSICIAN ASSISTANT

## 2021-02-03 PROCEDURE — 80053 COMPREHEN METABOLIC PANEL: CPT | Performed by: PHYSICIAN ASSISTANT

## 2021-02-03 PROCEDURE — 63710000001 PROMETHAZINE PER 12.5 MG: Performed by: INTERNAL MEDICINE

## 2021-02-03 PROCEDURE — 63710000001 INSULIN GLARGINE PER 5 UNITS: Performed by: INTERNAL MEDICINE

## 2021-02-03 PROCEDURE — 25010000002 CEFTRIAXONE PER 250 MG: Performed by: EMERGENCY MEDICINE

## 2021-02-03 PROCEDURE — 96365 THER/PROPH/DIAG IV INF INIT: CPT

## 2021-02-03 PROCEDURE — 25010000002 IOPAMIDOL 61 % SOLUTION: Performed by: EMERGENCY MEDICINE

## 2021-02-03 PROCEDURE — 25010000002 METOCLOPRAMIDE PER 10 MG: Performed by: PHYSICIAN ASSISTANT

## 2021-02-03 PROCEDURE — 85730 THROMBOPLASTIN TIME PARTIAL: CPT | Performed by: EMERGENCY MEDICINE

## 2021-02-03 PROCEDURE — 82140 ASSAY OF AMMONIA: CPT | Performed by: EMERGENCY MEDICINE

## 2021-02-03 PROCEDURE — 93005 ELECTROCARDIOGRAM TRACING: CPT | Performed by: PHYSICIAN ASSISTANT

## 2021-02-03 PROCEDURE — 87086 URINE CULTURE/COLONY COUNT: CPT | Performed by: EMERGENCY MEDICINE

## 2021-02-03 PROCEDURE — 84100 ASSAY OF PHOSPHORUS: CPT | Performed by: PHYSICIAN ASSISTANT

## 2021-02-03 PROCEDURE — 81001 URINALYSIS AUTO W/SCOPE: CPT | Performed by: PHYSICIAN ASSISTANT

## 2021-02-03 PROCEDURE — 96375 TX/PRO/DX INJ NEW DRUG ADDON: CPT

## 2021-02-03 RX ORDER — LEVETIRACETAM 500 MG/1
500 TABLET ORAL 2 TIMES DAILY
Status: DISCONTINUED | OUTPATIENT
Start: 2021-02-03 | End: 2021-02-07 | Stop reason: HOSPADM

## 2021-02-03 RX ORDER — SUCRALFATE 1 G/1
1 TABLET ORAL
Status: DISCONTINUED | OUTPATIENT
Start: 2021-02-03 | End: 2021-02-07 | Stop reason: HOSPADM

## 2021-02-03 RX ORDER — METOCLOPRAMIDE HYDROCHLORIDE 5 MG/ML
10 INJECTION INTRAMUSCULAR; INTRAVENOUS ONCE
Status: COMPLETED | OUTPATIENT
Start: 2021-02-03 | End: 2021-02-03

## 2021-02-03 RX ORDER — PROMETHAZINE HYDROCHLORIDE 12.5 MG/1
12.5 TABLET ORAL EVERY 6 HOURS PRN
Status: DISCONTINUED | OUTPATIENT
Start: 2021-02-03 | End: 2021-02-07 | Stop reason: HOSPADM

## 2021-02-03 RX ORDER — INSULIN GLARGINE 100 [IU]/ML
80 INJECTION, SOLUTION SUBCUTANEOUS NIGHTLY
Status: DISCONTINUED | OUTPATIENT
Start: 2021-02-03 | End: 2021-02-07 | Stop reason: HOSPADM

## 2021-02-03 RX ORDER — INSULIN LISPRO 100 [IU]/ML
0-14 INJECTION, SOLUTION INTRAVENOUS; SUBCUTANEOUS
Status: DISCONTINUED | OUTPATIENT
Start: 2021-02-04 | End: 2021-02-06

## 2021-02-03 RX ORDER — NICOTINE POLACRILEX 4 MG
15 LOZENGE BUCCAL
Status: DISCONTINUED | OUTPATIENT
Start: 2021-02-03 | End: 2021-02-07 | Stop reason: HOSPADM

## 2021-02-03 RX ORDER — MORPHINE SULFATE 2 MG/ML
2 INJECTION, SOLUTION INTRAMUSCULAR; INTRAVENOUS ONCE
Status: COMPLETED | OUTPATIENT
Start: 2021-02-03 | End: 2021-02-04

## 2021-02-03 RX ORDER — PANTOPRAZOLE SODIUM 40 MG/10ML
40 INJECTION, POWDER, LYOPHILIZED, FOR SOLUTION INTRAVENOUS
Status: DISCONTINUED | OUTPATIENT
Start: 2021-02-04 | End: 2021-02-07 | Stop reason: HOSPADM

## 2021-02-03 RX ORDER — MORPHINE SULFATE 2 MG/ML
4 INJECTION, SOLUTION INTRAMUSCULAR; INTRAVENOUS ONCE
Status: COMPLETED | OUTPATIENT
Start: 2021-02-03 | End: 2021-02-03

## 2021-02-03 RX ORDER — SODIUM CHLORIDE 0.9 % (FLUSH) 0.9 %
10 SYRINGE (ML) INJECTION AS NEEDED
Status: DISCONTINUED | OUTPATIENT
Start: 2021-02-03 | End: 2021-02-07 | Stop reason: HOSPADM

## 2021-02-03 RX ORDER — METOCLOPRAMIDE 5 MG/1
5 TABLET ORAL
Status: DISCONTINUED | OUTPATIENT
Start: 2021-02-03 | End: 2021-02-07 | Stop reason: HOSPADM

## 2021-02-03 RX ORDER — ALPRAZOLAM 0.5 MG/1
0.5 TABLET ORAL 2 TIMES DAILY PRN
Status: DISCONTINUED | OUTPATIENT
Start: 2021-02-03 | End: 2021-02-07 | Stop reason: HOSPADM

## 2021-02-03 RX ORDER — MIRTAZAPINE 15 MG/1
15 TABLET, FILM COATED ORAL NIGHTLY
Status: DISCONTINUED | OUTPATIENT
Start: 2021-02-03 | End: 2021-02-07 | Stop reason: HOSPADM

## 2021-02-03 RX ORDER — FAMOTIDINE 10 MG/ML
20 INJECTION, SOLUTION INTRAVENOUS ONCE
Status: COMPLETED | OUTPATIENT
Start: 2021-02-03 | End: 2021-02-03

## 2021-02-03 RX ORDER — SPIRONOLACTONE 100 MG/1
100 TABLET, FILM COATED ORAL DAILY
Status: DISCONTINUED | OUTPATIENT
Start: 2021-02-03 | End: 2021-02-07 | Stop reason: HOSPADM

## 2021-02-03 RX ORDER — CEFTRIAXONE SODIUM 1 G/50ML
1 INJECTION, SOLUTION INTRAVENOUS EVERY 24 HOURS
Status: DISCONTINUED | OUTPATIENT
Start: 2021-02-04 | End: 2021-02-05

## 2021-02-03 RX ORDER — FUROSEMIDE 80 MG
80 TABLET ORAL DAILY PRN
Status: DISCONTINUED | OUTPATIENT
Start: 2021-02-03 | End: 2021-02-07 | Stop reason: HOSPADM

## 2021-02-03 RX ORDER — PREGABALIN 75 MG/1
75 CAPSULE ORAL EVERY 12 HOURS SCHEDULED
Status: DISCONTINUED | OUTPATIENT
Start: 2021-02-03 | End: 2021-02-07 | Stop reason: HOSPADM

## 2021-02-03 RX ORDER — CEFTRIAXONE SODIUM 1 G/50ML
1 INJECTION, SOLUTION INTRAVENOUS ONCE
Status: COMPLETED | OUTPATIENT
Start: 2021-02-03 | End: 2021-02-03

## 2021-02-03 RX ORDER — DEXTROSE MONOHYDRATE 25 G/50ML
25 INJECTION, SOLUTION INTRAVENOUS
Status: DISCONTINUED | OUTPATIENT
Start: 2021-02-03 | End: 2021-02-07 | Stop reason: HOSPADM

## 2021-02-03 RX ORDER — SODIUM CHLORIDE 0.9 % (FLUSH) 0.9 %
10 SYRINGE (ML) INJECTION EVERY 12 HOURS SCHEDULED
Status: DISCONTINUED | OUTPATIENT
Start: 2021-02-03 | End: 2021-02-07 | Stop reason: HOSPADM

## 2021-02-03 RX ORDER — HYDROXYZINE 50 MG/1
50 TABLET, FILM COATED ORAL NIGHTLY
Status: DISCONTINUED | OUTPATIENT
Start: 2021-02-03 | End: 2021-02-07 | Stop reason: HOSPADM

## 2021-02-03 RX ADMIN — SODIUM CHLORIDE 1000 ML: 9 INJECTION, SOLUTION INTRAVENOUS at 11:49

## 2021-02-03 RX ADMIN — FAMOTIDINE 20 MG: 10 INJECTION INTRAVENOUS at 12:40

## 2021-02-03 RX ADMIN — INSULIN GLARGINE 80 UNITS: 100 INJECTION, SOLUTION SUBCUTANEOUS at 21:20

## 2021-02-03 RX ADMIN — RIFAXIMIN 550 MG: 550 TABLET ORAL at 22:24

## 2021-02-03 RX ADMIN — METOCLOPRAMIDE HYDROCHLORIDE 10 MG: 5 INJECTION INTRAMUSCULAR; INTRAVENOUS at 12:43

## 2021-02-03 RX ADMIN — CEFTRIAXONE SODIUM 1 G: 1 INJECTION, SOLUTION INTRAVENOUS at 15:34

## 2021-02-03 RX ADMIN — PROMETHAZINE HYDROCHLORIDE 12.5 MG: 12.5 TABLET ORAL at 19:52

## 2021-02-03 RX ADMIN — SODIUM CHLORIDE, PRESERVATIVE FREE 10 ML: 5 INJECTION INTRAVENOUS at 21:19

## 2021-02-03 RX ADMIN — METOCLOPRAMIDE 5 MG: 5 TABLET ORAL at 22:23

## 2021-02-03 RX ADMIN — MORPHINE SULFATE 4 MG: 2 INJECTION, SOLUTION INTRAMUSCULAR; INTRAVENOUS at 12:46

## 2021-02-03 RX ADMIN — LEVETIRACETAM 500 MG: 500 TABLET, FILM COATED ORAL at 21:17

## 2021-02-03 RX ADMIN — HYDROXYZINE HYDROCHLORIDE 50 MG: 50 TABLET ORAL at 21:17

## 2021-02-03 RX ADMIN — IOPAMIDOL 85 ML: 612 INJECTION, SOLUTION INTRAVENOUS at 13:30

## 2021-02-03 NOTE — ED PROVIDER NOTES
Subjective   60-year-old female with past medical history of cirrhosis, C. difficile, esophageal varices status post TIPS, history of bleeding ulcer here for chief complaint of epigastric abdominal pain, nausea, and vomiting.  History was obtained from the patient.  The patient states that she is had increasing burning sensation and has had nonbilious nonbloody vomiting.  She states that this is got worse today.  She states that she has not been able to keep any solids down.  She states that she is tried keeping some liquids down.  Patient complains of epigastric and right upper quadrant abdominal pain, nonradiating, moderate, sharp, nothing is better, nothing is worse.  Patient denies any blood in her stool.  She denies any black tarry stools.  Patient denies any diarrhea.  Patient states that she has had chronic shortness of breath that is not new.  She denies any chest pain.  Patient denies any fevers or chills.          Review of Systems   All other systems reviewed and are negative.      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   • Cirrhosis (CMS/HCC)    • Clostridium difficile infection    • Colon polyps    • Depression    • Diabetes mellitus (CMS/HCC)     Adult onset, insulin requiring   • Duodenal ulcer with hemorrhage    • Esophageal varices determined by endoscopy (CMS/HCC)    • Fibromyalgia    • Gallbladder disease    • Gastric varices    • Gastroparesis    • Glaucoma    • Granuloma annulare    • H/O B12 deficiency    • H/O Bleeding ulcer     And gastroesophageal varices   • H/O mixed connective tissue disorder    • Hemorrhoids    • Hiatal hernia    • History of transfusion     needs bendryl  flushes sensation and temp goes up   • Hx of being hospitalized     In [place] for GI  "bleeding from ulcer and varices   • Hyperlipidemia    • Migraine    • Mitral valve prolapse    • DUKES (nonalcoholic steatohepatitis) 11/2016   • Orthostatic hypotension 02/2019   • BRICE (obstructive sleep apnea)    • Pancreas divisum    • Pancytopenia (CMS/HCC)     Chronic, due to cirrhosis and hypersplenism   • Peptic ulcer disease     And esophageal varices   • PONV (postoperative nausea and vomiting)    • RA (rheumatoid arthritis) (CMS/HCC)    • Seizure disorder (CMS/HCC)     last 2003   • Seizures (CMS/HCC)    • Systemic lupus (CMS/HCC)    • TIA (transient ischemic attack) 1984       Allergies   Allergen Reactions   • Albuterol Anaphylaxis   • Imitrex [Sumatriptan] Anaphylaxis   • Tramadol Nausea Only, Other (See Comments) and GI Intolerance     Per pt causes her \"palsy, meaning shaking and tremors\"    • Nsaids Other (See Comments)     Told by MD not to take due to liver problems   • Tylenol [Acetaminophen] Other (See Comments)     Has liver problems and told by MD to not take   • Zofran [Ondansetron Hcl] Other (See Comments)     Pt states does not work to correct nausea and vomiting.    • Lactulose Nausea And Vomiting and Other (See Comments)     Severe abdominal pain   • Quinine Derivatives Nausea And Vomiting       Past Surgical History:   Procedure Laterality Date   • BARTHOLIN CYST MARSUPIALIZATION Left 1/18/2021    Procedure: EXCISION OF LABIAL CYST;  Surgeon: Melinda Thakkar MD;  Location: [facility];  Service: Obstetrics/Gynecology;  Laterality: Left;   • BLADDER REPAIR  1991   • CATARACT EXTRACTION     • CHOLECYSTECTOMY  1994   • COLONOSCOPY     • ENDOSCOPY N/A 11/7/2016    Procedure: ESOPHAGOGASTRODUODENOSCOPY WITH COLD POLYPECTOMY, BANDING,  AND CLIPS TIMES 2;  Surgeon: Rich Coleman MD;  Location: [facility] ENDOSCOPY;  Service:    • ENDOSCOPY N/A 12/22/2016    Procedure: ESOPHAGOGASTRODUODENOSCOPY WITH ESOPHAGEAL BANDING. 5 BANDS FIRED, 4 BANDS ADHERERD TO MUCOSA;  Surgeon: Rich Coleman MD;  " "Location: [facility] ENDOSCOPY;  Service:    • ENDOSCOPY N/A 2/15/2017    Procedure: ESOPHAGOGASTRODUODENOSCOPY AT BEDSIDE with esophageal varices banding (3);  Surgeon: Rich Coleman MD;  Location: [facility] ENDOSCOPY;  Service:    • ENDOSCOPY N/A 4/6/2017    Procedure: ESOPHAGOGASTRODUODENOSCOPY WITH ESOPHAGEAL VARICES BANDING x2;  Surgeon: Rich Coleman MD;  Location: [facility] ENDOSCOPY;  Service:    • ENDOSCOPY N/A 11/24/2017    Procedure: ESOPHAGOGASTRODUODENOSCOPY with biopsies;  Surgeon: Rich Coleman MD;  [facility];  Service:    • ENDOSCOPY N/A 10/5/2018    Procedure: ESOPHAGOGASTRODUODENOSCOPY;  Surgeon: Rich Coleman MD;  Location: [facility] ENDOSCOPY;  Service: Gastroenterology   • ENDOSCOPY N/A 5/10/2019    Procedure: ESOPHAGOGASTRODUODENOSCOPY AT BEDSIDE WITH VARICEAL LIGATION;  Surgeon: Rich Coleman MD;  Location: [facility] ENDOSCOPY;  Service: Gastroenterology   • ENDOSCOPY N/A 6/28/2019    Procedure: ESOPHAGOGASTRODUODENOSCOPY WITH ESOPHAGEAL BANDING (3 BANDS);  Surgeon: Rich Coleman MD;  Location: [facility] ENDOSCOPY;  Service: Gastroenterology   • ENDOSCOPY AND COLONOSCOPY  2014    Dr. Rich Coleman with findings of candida esophagitis   • EYE SURGERY     • HYSTERECTOMY  1986    partial   • JOINT REPLACEMENT     • KNEE SURGERY Right 1995    \"right knee recontstruction\"   • LIVER BIOPSY  01/2015   • MASS EXCISION     • STOMACH SURGERY     • TIPS PROCEDURE  2020    x2       Family History   Problem Relation Age of Onset   • Liver disease Other    • Depression Other    • Heart disease Other    • Hypertension Other    • Diabetes Other    • Breast cancer Other    • Brain cancer Other    • Uterine cancer Other    • Colon cancer Other    • Leukemia Other    • Colon cancer Maternal Aunt    • Hypertension Mother    • Diabetes type II Mother    • Rheum arthritis Mother    • Liver disease Mother         DUKES   • Heart disease Father    • Diabetes type II Father    • Diabetes type II " Sister    • Lupus Sister    • Malig Hyperthermia Neg Hx        Social History     Socioeconomic History   • Marital status:      Spouse name: Jose   • Number of children: Not on file   • Years of education: Not on file   • Highest education level: Not on file   Occupational History     Employer: DISABLED   Tobacco Use   • Smoking status: Former Smoker     Packs/day: 1.00     Years: 4.00     Pack years: 4.00     Types: Cigarettes     Quit date: 2015     Years since quittin.1   • Smokeless tobacco: Never Used   Substance and Sexual Activity   • Alcohol use: No   • Drug use: Never   • Sexual activity: Defer   Social History Narrative    Moved to [place] from [place]. She is currently medically disabled.           Objective   Physical Exam  Vitals signs reviewed.   Constitutional:       General: She is not in acute distress.     Appearance: Normal appearance. She is not ill-appearing.   HENT:      Head: Normocephalic and atraumatic.      Right Ear: External ear normal.      Left Ear: External ear normal.      Nose: Nose normal. No congestion or rhinorrhea.      Mouth/Throat:      Mouth: Mucous membranes are moist.      Pharynx: Oropharynx is clear. No oropharyngeal exudate or posterior oropharyngeal erythema.   Eyes:      General: No scleral icterus.        Right eye: No discharge.         Left eye: No discharge.      Extraocular Movements: Extraocular movements intact.      Conjunctiva/sclera: Conjunctivae normal.      Pupils: Pupils are equal, round, and reactive to light.   Neck:      Musculoskeletal: Normal range of motion and neck supple. No neck rigidity or muscular tenderness.   Cardiovascular:      Rate and Rhythm: Normal rate and regular rhythm.      Pulses: Normal pulses.      Heart sounds: Normal heart sounds. No murmur. No friction rub. No gallop.    Pulmonary:      Effort: Pulmonary effort is normal. No respiratory distress.      Breath sounds: Normal breath sounds. No stridor. No  wheezing, rhonchi or rales.   Chest:      Chest wall: No tenderness.   Abdominal:      General: Abdomen is flat. Bowel sounds are normal. There is no distension.      Palpations: Abdomen is soft.      Tenderness: There is abdominal tenderness (Epigastric and right upper quadrant). There is no right CVA tenderness, left CVA tenderness, guarding or rebound.   Musculoskeletal: Normal range of motion.         General: No swelling, tenderness, deformity or signs of injury.      Right lower leg: No edema.      Left lower leg: No edema.   Skin:     General: Skin is warm and dry.      Capillary Refill: Capillary refill takes less than 2 seconds.      Coloration: Skin is not jaundiced or pale.      Findings: No bruising or erythema.   Neurological:      General: No focal deficit present.      Mental Status: She is alert and oriented to person, place, and time.      Cranial Nerves: No cranial nerve deficit.      Sensory: No sensory deficit.   Psychiatric:         Mood and Affect: Mood normal.         Behavior: Behavior normal.         Procedures           ED Course  ED Course as of Feb 03 1550   Wed Feb 03, 2021   1255 Glucose(!): 298 [KA]   1255 Beta-Hydroxybutyrate Quant(!): 0.361 [KA]   1255 Anion Gap: 12.3 [KA]   1255 CO2: 22.7 [KA]   1255 WBC: 6.13 [KA]   1255 Hemoglobin: 13.2 [KA]   1255 Troponin T: <0.010 [KA]   1255 Magnesium: 2.0 [KA]   1255 Phosphorus: 2.7 [KA]   1255 Lipase: 14 [KA]   1410 Glucose(!): 298 [KS]   1410 Sodium(!): 131 [KS]   1411 Total Bilirubin(!): 2.2 [KS]   1411 Alkaline Phosphatase(!): 186 [KS]   1411 AST (SGOT)(!): 38 [KS]   1411 ALT (SGPT)(!): 40 [KS]   1454 WBC, UA(!): 21-30 [KS]   1454 Bacteria, UA(!): 2+ [KS]   1454 RBC, UA(!): 13-20 [KS]   1459 I reviewed the EKG, right bundle branch block, rate of 99, no ST elevation.    [KS]   1546 IMPRESSION:  1. Hepatic cirrhosis with splenomegaly. No ascites. Patent TIPS stent in  place.  2. Circumferential wall thickening of the stomach that may  suggest  gastritis.     These findings were discussed with Dr. Astudillo by telephone at the time of  dictation.     Radiation dose reduction techniques were utilized, including automated  exposure control and exposure modulation based on body size.       [KS]   1546 Beta-Hydroxybutyrate Quant(!): 0.361 [KS]   1547 FINDINGS:    A single portable view of the chest was obtained. The cardiac silhouette  and mediastinal and hilar contours are unchanged. Lungs and pleural  spaces are clear. There is chronic appearing deformity of the proximal  left humerus. There is multilevel degenerative disease. No significant  change from earlier same date chest radiograph.    [KS]      ED Course User Index  [KA] Sanjuana Serrano PA  [KS] Gennaro Astudillo MD                                           MDM  Number of Diagnoses or Management Options  Abdominal pain, unspecified abdominal location:   Cirrhosis of liver without ascites, unspecified hepatic cirrhosis type (CMS/HCC):   Gastritis without bleeding, unspecified chronicity, unspecified gastritis type:   Nausea and vomiting, intractability of vomiting not specified, unspecified vomiting type:      Amount and/or Complexity of Data Reviewed  Clinical lab tests: ordered and reviewed  Tests in the radiology section of CPT®: ordered and reviewed  Decide to obtain previous medical records or to obtain history from someone other than the patient: yes    Risk of Complications, Morbidity, and/or Mortality  General comments: When I first saw the patient, the patient appeared mildly dehydrated.  IV was started and labs were obtained.  Patient was given IV fluids.  Labs remarkable for mild elevation in AST and ALT, mild elevation alkaline phosphatase.  Given that she was complaining of right upper quadrant abdominal pain and has history of cirrhosis and epigastric pain, I did get a CT of the abdomen pelvis with contrast.  This showed that the patient had some gastritis.  Patient also got urine  studies that showed a possible UTI.  She was given 1 dose of ceftriaxone in the ED.  I did get urine cultures prior to giving this antibiotic. Pt was given Pepcid in the ED x1.  Patient was also given antinausea medication in the ED. Pt had bolused herself insulin prior to coming to the ED.  Patient is not in DKA given that her gap was normal.  At this point, given her cirrhosis with her sugars being high, the decision was made to admit the patient to the hospitalist.  I spoke to Dr. Gonzalez and he has accepted the patient.  I defer all further management of this patient to the inpatient hospitalist.        Final diagnoses:   Nausea and vomiting, intractability of vomiting not specified, unspecified vomiting type   Abdominal pain, unspecified abdominal location   Gastritis without bleeding, unspecified chronicity, unspecified gastritis type   Cirrhosis of liver without ascites, unspecified hepatic cirrhosis type (CMS/HCC)   Hyperglycemia            Gennaro Astudillo MD  02/03/21 [id number]

## 2021-02-03 NOTE — ED TRIAGE NOTES
Has been vomiting since Sunday.  She has burning in esophagus.  Generalized abd pain.  Has had HA.  Blood sugar was 563 this am.  She took 60 units novalog and 80 units of lantus (lantus is her regular dose).      Patient was placed in face mask during first look triage.  Patient was wearing a face mask throughout encounter.  I wore personal protective equipment throughout the encounter.  Hand hygiene was performed before and after patient encounter.

## 2021-02-03 NOTE — ED PROVIDER NOTES
EMERGENCY DEPARTMENT ENCOUNTER    Room Number:  25/25  Date seen:  2/3/2021  Time seen: 11:45 EST  PCP: Kulwant Martínez APRN  Historian: patient      HPI:  Chief Complaint: vomiting    A complete HPI/ROS/PMH/PSH/SH/FH are unobtainable due to: none    Context: Silvia Zabala is a 60 y.o. female with a history of insulin-dependent diabetes, gastroparesis, nonalcoholic liver cirrhosis with esophageal varices who presents to the ED for evaluation of severe nausea vomiting and generalized abdominal pain that began at abruptly at 2 AM today.  She reports the pain is constant, the vomiting is intermittent, nothing seems to make it worse or better.  She reports 10+ episodes of nonbloody emesis as well as 5 episodes of watery nonbloody diarrhea.  Her abdominal pain is generalized.  She reports a severe burning in her chest every time she tried to eat or drink anything for the last 2 days and therefore the had a very poor appetite.  She denies any shortness of breath fever cough congestion chills hematuria dysuria or urinary frequency.  Her gastroenterologist is Dr. Smalls.  She states this does feel similar to previous episodes of gastroparesis.  Her blood sugar was over 500 this morning and around 8 AM she took her normal daily dose of Lantus which was 80 units subcu as well as 60 units of NovoLog.  She states she checked her serum ketones this morning and they were 0.        PAST MEDICAL HISTORY  Active Ambulatory Problems     Diagnosis Date Noted   • Cirrhosis of liver (CMS/HCC) 03/08/2016   • Rheumatoid arthritis (CMS/HCC) 03/08/2016   • Anxiety and depression 03/08/2016   • Type 2 diabetes mellitus, uncontrolled, with neuropathy (CMS/HCC) 03/15/2016   • Fibrositis 03/15/2016   • Change in blood platelet count 03/15/2016   • Pancytopenia (CMS/HCC) 04/12/2016   • Hypersplenism 04/12/2016   • Nausea & vomiting 06/14/2016   • DUKES (nonalcoholic steatohepatitis) 06/14/2016   • Hyperlipidemia    • Abdominal pain 02/14/2017    • Hematemesis 02/15/2017   • Ascites 02/21/2017   • Portal hypertension (CMS/HCC) 02/22/2017   • Systemic lupus (CMS/HCC) 02/22/2017   • Secondary esophageal varices without bleeding (CMS/HCC) 02/22/2017   • Esophageal varices with bleeding (CMS/HCC) 03/07/2017   • Gastroparesis 10/17/2017   • Cirrhosis of liver with ascites (CMS/HCC) 11/17/2017   • Diabetic ketoacidosis without coma associated with type 2 diabetes mellitus (CMS/HCC) 12/16/2017   • Diabetic peripheral neuropathy (CMS/HCC) 12/17/2017   • History of seizure disorder 12/17/2017   • Hepatic encephalopathy (CMS/HCC) 05/08/2018   • Thrombocytopenia (CMS/HCC) 05/16/2018   • Fever 05/17/2018   • Acute ITP (CMS/HCC) 05/18/2018   • Degeneration of lumbosacral intervertebral disc 05/30/2018   • Seizure (CMS/HCC) 05/30/2018   • Spondylosis without myelopathy 05/30/2018   • Intractable vomiting with nausea 10/03/2018   • UGI bleed 10/04/2018   • Migraine without aura 10/11/2018   • Urinary retention 10/11/2018   • Uncontrolled diabetes mellitus with hyperglycemia (CMS/HCC) 12/13/2018   • BRICE (obstructive sleep apnea) 12/13/2018   • Gastroparesis 12/13/2018   • Hyperammonemia (CMS/HCC) 12/13/2018   • Hyponatremia 12/13/2018   • Anemia 12/13/2018   • Vitamin D insufficiency 12/16/2018   • Hyperglycemia 01/17/2019   • Dehydration 01/17/2019   • Iron deficiency anemia 01/22/2019   • Adverse effect of iron or its compound, subsequent encounter 01/22/2019   • Hemorrhage of anus and rectum 02/15/2019   • Vulvar lesion 05/09/2019   • Acute upper GI bleed 05/09/2019   • Acute blood loss anemia 05/14/2019   • Acute hyperkalemia 05/14/2019   • Labial cyst 01/18/2021     Resolved Ambulatory Problems     Diagnosis Date Noted   • Upper GI bleed 04/25/2017   • Abnormal finding of blood chemistry  05/08/2018     Past Medical History:   Diagnosis Date   • Anxiety    • Arthritis    • Broken shoulder    • Chromosomal abnormality    • Cirrhosis (CMS/HCC)    • Clostridium  difficile infection    • Colon polyps    • Depression    • Diabetes mellitus (CMS/HCC)    • Duodenal ulcer with hemorrhage    • Esophageal varices determined by endoscopy (CMS/HCC)    • Fibromyalgia    • Gallbladder disease    • Gastric varices    • Glaucoma    • Granuloma annulare    • H/O B12 deficiency    • H/O Bleeding ulcer    • H/O mixed connective tissue disorder    • Hemorrhoids    • Hiatal hernia    • History of transfusion    • Hx of being hospitalized    • Migraine    • Mitral valve prolapse    • Orthostatic hypotension 02/2019   • Pancreas divisum    • Peptic ulcer disease    • PONV (postoperative nausea and vomiting)    • RA (rheumatoid arthritis) (CMS/HCC)    • Seizure disorder (CMS/HCC)    • Seizures (CMS/HCC)    • TIA (transient ischemic attack) 1984         PAST SURGICAL HISTORY  Past Surgical History:   Procedure Laterality Date   • BARTHOLIN CYST MARSUPIALIZATION Left 1/18/2021    Procedure: EXCISION OF LABIAL CYST;  Surgeon: Melinda Thakkar MD;  Location: [facility] MAIN OR;  Service: Obstetrics/Gynecology;  Laterality: Left;   • BLADDER REPAIR  1991   • CATARACT EXTRACTION     • CHOLECYSTECTOMY  1994   • COLONOSCOPY     • ENDOSCOPY N/A 11/7/2016    Procedure: ESOPHAGOGASTRODUODENOSCOPY WITH COLD POLYPECTOMY, BANDING,  AND CLIPS TIMES 2;  Surgeon: Rich Coleman MD;  Location: [facility] ENDOSCOPY;  Service:    • ENDOSCOPY N/A 12/22/2016    Procedure: ESOPHAGOGASTRODUODENOSCOPY WITH ESOPHAGEAL BANDING. 5 BANDS FIRED, 4 BANDS ADHERERD TO MUCOSA;  Surgeon: Rich Coleman MD;  Location: [facility] ENDOSCOPY;  Service:    • ENDOSCOPY N/A 2/15/2017    Procedure: ESOPHAGOGASTRODUODENOSCOPY AT BEDSIDE with esophageal varices banding (3);  Surgeon: Rich Coleman MD;  Location: [facility] ENDOSCOPY;  Service:    • ENDOSCOPY N/A 4/6/2017    Procedure: ESOPHAGOGASTRODUODENOSCOPY WITH ESOPHAGEAL VARICES BANDING x2;  Surgeon: Rich Coleman MD;  Location: [facility] ENDOSCOPY;  Service:    • ENDOSCOPY N/A 11/24/2017  "   Procedure: ESOPHAGOGASTRODUODENOSCOPY with biopsies;  Surgeon: Rich Coleman MD;  Location: [facility] ENDOSCOPY;  Service:    • ENDOSCOPY N/A 10/5/2018    Procedure: ESOPHAGOGASTRODUODENOSCOPY;  Surgeon: Rich Coleman MD;  Location: [facility] ENDOSCOPY;  Service: Gastroenterology   • ENDOSCOPY N/A 5/10/2019    Procedure: ESOPHAGOGASTRODUODENOSCOPY AT BEDSIDE WITH VARICEAL LIGATION;  Surgeon: Rich Coleman MD;  Location: [facility] ENDOSCOPY;  Service: Gastroenterology   • ENDOSCOPY N/A 6/28/2019    Procedure: ESOPHAGOGASTRODUODENOSCOPY WITH ESOPHAGEAL BANDING (3 BANDS);  Surgeon: Rich Coleman MD;  Location: [facility] ENDOSCOPY;  Service: Gastroenterology   • ENDOSCOPY AND COLONOSCOPY  2014    Dr. Rich Coleman with findings of candida esophagitis   • EYE SURGERY     • HYSTERECTOMY  1986    partial   • JOINT REPLACEMENT     • KNEE SURGERY Right 1995    \"right knee recontstruction\"   • LIVER BIOPSY  01/2015   • MASS EXCISION     • STOMACH SURGERY     • TIPS PROCEDURE  2020    x2         FAMILY HISTORY  Family History   Problem Relation Age of Onset   • Liver disease Other    • Depression Other    • Heart disease Other    • Hypertension Other    • Diabetes Other    • Breast cancer Other    • Brain cancer Other    • Uterine cancer Other    • Colon cancer Other    • Leukemia Other    • Colon cancer Maternal Aunt    • Hypertension Mother    • Diabetes type II Mother    • Rheum arthritis Mother    • Liver disease Mother         DUKES   • Heart disease Father    • Diabetes type II Father    • Diabetes type II Sister    • Lupus Sister    • Malig Hyperthermia Neg Hx          SOCIAL HISTORY  Social History     Socioeconomic History   • Marital status:      Spouse name: Jose   • Number of children: Not on file   • Years of education: Not on file   • Highest education level: Not on file   Occupational History     Employer: DISABLED   Tobacco Use   • Smoking status: Former Smoker     Packs/day: 1.00     Years: 4.00 "     Pack years: 4.00     Types: Cigarettes     Quit date: 2015     Years since quittin.1   • Smokeless tobacco: Never Used   Substance and Sexual Activity   • Alcohol use: No   • Drug use: Never   • Sexual activity: Defer   Social History Narrative    Moved to [place] from [place]. She is currently medically disabled.         ALLERGIES  Albuterol, Imitrex [sumatriptan], Tramadol, Nsaids, Tylenol [acetaminophen], Zofran [ondansetron hcl], Lactulose, and Quinine derivatives        REVIEW OF SYSTEMS  Review of Systems     All systems reviewed and negative except for those discussed in HPI.       PHYSICAL EXAM  ED Triage Vitals   Temp Heart Rate Resp BP SpO2   21 1059 21 1059 21 1059 21 1103 21 1059   96.9 °F (36.1 °C) 113 20 149/87 99 %      Temp src Heart Rate Source Patient Position BP Location FiO2 (%)   21 1059 21 1059 -- -- --   Tympanic Monitor            GENERAL: not distressed  HENT: atraumatic  EYES: no scleral icterus  CV: regular rhythm, mild tachycardia, rate 108  RESPIRATORY: normal effort CTA B  ABDOMEN: obese, soft, diffusely tender, nondistended, normal bowel sounds, no guarding or rigidity  MUSCULOSKELETAL: no deformity  NEURO: alert, moves all extremities, follows commands  SKIN: warm, dry    Vital signs and nursing notes reviewed.          LAB RESULTS  Recent Results (from the past 24 hour(s))   POC Glucose Once    Collection Time: 21 11:24 AM    Specimen: Blood   Result Value Ref Range    Glucose 309 (H) 70 - 130 mg/dL   Light Blue Top    Collection Time: 21 11:39 AM   Result Value Ref Range    Extra Tube hold for add-on    Green Top (Gel)    Collection Time: 21 11:39 AM   Result Value Ref Range    Extra Tube Hold for add-ons.    Lavender Top    Collection Time: 21 11:39 AM   Result Value Ref Range    Extra Tube     Gold Top - SST    Collection Time: 21 11:39 AM   Result Value Ref Range    Extra Tube Hold for  add-ons.    Comprehensive Metabolic Panel    Collection Time: 02/03/21 11:39 AM    Specimen: Blood   Result Value Ref Range    Glucose 298 (H) 65 - 99 mg/dL    BUN 21 (H) 6 - 20 mg/dL    Creatinine 0.85 0.57 - 1.00 mg/dL    Sodium 131 (L) 136 - 145 mmol/L    Potassium 3.7 3.5 - 5.2 mmol/L    Chloride 96 (L) 98 - 107 mmol/L    CO2 22.7 22.0 - 29.0 mmol/L    Calcium 9.7 8.6 - 10.5 mg/dL    Total Protein 6.9 6.0 - 8.5 g/dL    Albumin 4.20 3.50 - 5.20 g/dL    ALT (SGPT) 40 (H) 1 - 33 U/L    AST (SGOT) 38 (H) 1 - 32 U/L    Alkaline Phosphatase 186 (H) 39 - 117 U/L    Total Bilirubin 2.2 (H) 0.0 - 1.2 mg/dL    eGFR Non African Amer 69 >60 mL/min/1.73    Globulin 2.7 gm/dL    A/G Ratio 1.6 g/dL    BUN/Creatinine Ratio 24.7 7.0 - 25.0    Anion Gap 12.3 5.0 - 15.0 mmol/L   Magnesium    Collection Time: 02/03/21 11:39 AM    Specimen: Blood   Result Value Ref Range    Magnesium 2.0 1.6 - 2.6 mg/dL   Phosphorus    Collection Time: 02/03/21 11:39 AM    Specimen: Blood   Result Value Ref Range    Phosphorus 2.7 2.5 - 4.5 mg/dL   Lipase    Collection Time: 02/03/21 11:39 AM    Specimen: Blood   Result Value Ref Range    Lipase 14 13 - 60 U/L   Troponin    Collection Time: 02/03/21 11:39 AM    Specimen: Blood   Result Value Ref Range    Troponin T <0.010 0.000 - 0.030 ng/mL   Beta Hydroxybutyrate Quantitative    Collection Time: 02/03/21 11:39 AM    Specimen: Blood   Result Value Ref Range    Beta-Hydroxybutyrate Quant 0.361 (H) 0.020 - 0.270 mmol/L   CBC Auto Differential    Collection Time: 02/03/21 11:39 AM    Specimen: Blood   Result Value Ref Range    WBC 6.13 3.40 - 10.80 10*3/mm3    RBC 4.23 3.77 - 5.28 10*6/mm3    Hemoglobin 13.2 12.0 - 15.9 g/dL    Hematocrit 37.4 34.0 - 46.6 %    MCV 88.4 79.0 - 97.0 fL    MCH 31.2 26.6 - 33.0 pg    MCHC 35.3 31.5 - 35.7 g/dL    RDW 16.2 (H) 12.3 - 15.4 %    RDW-SD 52.1 37.0 - 54.0 fl    MPV 11.3 6.0 - 12.0 fL    Platelets 87 (L) 140 - 450 10*3/mm3    Neutrophil % 76.6 (H) 42.7 - 76.0 %     Lymphocyte % 12.1 (L) 19.6 - 45.3 %    Monocyte % 8.8 5.0 - 12.0 %    Eosinophil % 0.2 (L) 0.3 - 6.2 %    Basophil % 1.0 0.0 - 1.5 %    Immature Grans % 1.3 (H) 0.0 - 0.5 %    Neutrophils, Absolute 4.70 1.70 - 7.00 10*3/mm3    Lymphocytes, Absolute 0.74 0.70 - 3.10 10*3/mm3    Monocytes, Absolute 0.54 0.10 - 0.90 10*3/mm3    Eosinophils, Absolute 0.01 0.00 - 0.40 10*3/mm3    Basophils, Absolute 0.06 0.00 - 0.20 10*3/mm3    Immature Grans, Absolute 0.08 (H) 0.00 - 0.05 10*3/mm3    nRBC 0.0 0.0 - 0.2 /100 WBC   Protime-INR    Collection Time: 02/03/21 11:39 AM    Specimen: Blood   Result Value Ref Range    Protime 14.5 (H) 11.7 - 14.2 Seconds    INR 1.15 (H) 0.90 - 1.10   aPTT    Collection Time: 02/03/21 11:39 AM    Specimen: Blood   Result Value Ref Range    PTT 27.8 22.7 - 35.4 seconds   ECG 12 Lead    Collection Time: 02/03/21 11:50 AM   Result Value Ref Range    QT Interval 393 ms   Urinalysis With Microscopic If Indicated (No Culture) - Urine, Clean Catch    Collection Time: 02/03/21  1:01 PM    Specimen: Urine, Clean Catch   Result Value Ref Range    Color, UA Yellow Yellow, Straw    Appearance, UA Clear Clear    pH, UA 5.5 5.0 - 8.0    Specific Gravity, UA 1.010 1.005 - 1.030    Glucose,  mg/dL (1+) (A) Negative    Ketones, UA Negative Negative    Bilirubin, UA Negative Negative    Blood, UA Small (1+) (A) Negative    Protein, UA 30 mg/dL (1+) (A) Negative    Leuk Esterase, UA Moderate (2+) (A) Negative    Nitrite, UA Negative Negative    Urobilinogen, UA 0.2 E.U./dL 0.2 - 1.0 E.U./dL   Urinalysis, Microscopic Only - Urine, Clean Catch    Collection Time: 02/03/21  1:01 PM    Specimen: Urine, Clean Catch   Result Value Ref Range    RBC, UA 13-20 (A) None Seen, 0-2 /HPF    WBC, UA 21-30 (A) None Seen, 0-2 /HPF    Bacteria, UA 2+ (A) None Seen /HPF    Squamous Epithelial Cells, UA 3-6 (A) None Seen, 0-2 /HPF    Hyaline Casts, UA 3-6 None Seen /LPF    Methodology Manual Light Microscopy    POC Glucose  Once    Collection Time: 02/03/21  1:17 PM    Specimen: Blood   Result Value Ref Range    Glucose 318 (H) 70 - 130 mg/dL   POC Glucose Once    Collection Time: 02/03/21  2:30 PM    Specimen: Blood   Result Value Ref Range    Glucose 310 (H) 70 - 130 mg/dL   Respiratory Panel PCR w/COVID-19(SARS-CoV-2) MARY KATE/TONNY/DANNY/PAD/COR/MAD/MANINDER In-House, NP Swab in UTM/VTM, 3-4 HR TAT - Swab, Nasopharynx    Collection Time: 02/03/21  2:40 PM    Specimen: Nasopharynx; Swab   Result Value Ref Range    ADENOVIRUS, PCR Not Detected Not Detected    Coronavirus 229E Not Detected Not Detected    Coronavirus HKU1 Not Detected Not Detected    Coronavirus NL63 Not Detected Not Detected    Coronavirus OC43 Not Detected Not Detected    COVID19 Not Detected Not Detected - Ref. Range    Human Metapneumovirus Not Detected Not Detected    Human Rhinovirus/Enterovirus Not Detected Not Detected    Influenza A PCR Not Detected Not Detected    Influenza B PCR Not Detected Not Detected    Parainfluenza Virus 1 Not Detected Not Detected    Parainfluenza Virus 2 Not Detected Not Detected    Parainfluenza Virus 3 Not Detected Not Detected    Parainfluenza Virus 4 Not Detected Not Detected    RSV, PCR Not Detected Not Detected    Bordetella pertussis pcr Not Detected Not Detected    Bordetella parapertussis PCR Not Detected Not Detected    Chlamydophila pneumoniae PCR Not Detected Not Detected    Mycoplasma pneumo by PCR Not Detected Not Detected   Ammonia    Collection Time: 02/03/21  2:45 PM    Specimen: Blood   Result Value Ref Range    Ammonia 31 11 - 51 umol/L       Ordered the above labs and independently reviewed the results.        RADIOLOGY  XR Chest AP   Final Result      CT Abdomen Pelvis With Contrast   Preliminary Result   1. Hepatic cirrhosis with splenomegaly. No ascites. Patent TIPS stent in   place.   2. Circumferential wall thickening of the stomach that may suggest   gastritis.       These findings were discussed with Dr. Astudillo by  telephone at the time of   dictation.       Radiation dose reduction techniques were utilized, including automated   exposure control and exposure modulation based on body size.              XR Chest 1 View   Final Result   No acute process.       This report was finalized on 2/3/2021 12:27 PM by Dr. Hema Sampson M.D.              I ordered the above noted radiological studies. Reviewed by me and discussed with radiologist.  See dictation for official radiology interpretation.    PROCEDURES  Procedures        MEDICATIONS GIVEN IN ER  Medications   sodium chloride 0.9 % flush 10 mL (has no administration in time range)   pantoprazole (PROTONIX) injection 40 mg (has no administration in time range)   sodium chloride 0.9 % bolus 1,000 mL (0 mL Intravenous Stopped 2/3/21 1300)   metoclopramide (REGLAN) injection 10 mg (10 mg Intravenous Given 2/3/21 1243)   famotidine (PEPCID) injection 20 mg (20 mg Intravenous Given 2/3/21 1240)   morphine injection 4 mg (4 mg Intravenous Given 2/3/21 1246)   iopamidol (ISOVUE-300) 61 % injection 100 mL (85 mL Intravenous Given by Other 2/3/21 1330)   cefTRIAXone (ROCEPHIN) IVPB 1 g (0 g Intravenous Stopped 2/3/21 1604)             PROGRESS AND CONSULTS    Differential diagnosis includes but is not limited to:  - hepatobiliary pathology such as cholecystitis, cholangitis, and symptomatic cholelithiasis  - Pancreatitis  - Dyspepsia  - Small bowel obstruction  - Appendicitis  - Diverticulitis  - UTI including pyelonephritis  - Ureteral stone  - Zoster  - Colitis, including infectious and ischemic  - Atypical ACS  - gastroparesis    ED Course as of Feb 03 1806   Wed Feb 03, 2021   1255 Glucose(!): 298 [KA]   1255 Beta-Hydroxybutyrate Quant(!): 0.361 [KA]   1255 Anion Gap: 12.3 [KA]   1255 CO2: 22.7 [KA]   1255 WBC: 6.13 [KA]   1255 Hemoglobin: 13.2 [KA]   1255 Troponin T: <0.010 [KA]   1255 Magnesium: 2.0 [KA]   1255 Phosphorus: 2.7 [KA]   1255 Lipase: 14 [KA]   1410 Glucose(!):  298 [KS]   1410 Sodium(!): 131 [KS]   1411 Total Bilirubin(!): 2.2 [KS]   1411 Alkaline Phosphatase(!): 186 [KS]   1411 AST (SGOT)(!): 38 [KS]   1411 ALT (SGPT)(!): 40 [KS]   1454 WBC, UA(!): 21-30 [KS]   1454 Bacteria, UA(!): 2+ [KS]   1454 RBC, UA(!): 13-20 [KS]   1459 I reviewed the EKG, right bundle branch block, rate of 99, no ST elevation.    [KS]   1546 IMPRESSION:  1. Hepatic cirrhosis with splenomegaly. No ascites. Patent TIPS stent in  place.  2. Circumferential wall thickening of the stomach that may suggest  gastritis.     These findings were discussed with Dr. Astudillo by telephone at the time of  dictation.     Radiation dose reduction techniques were utilized, including automated  exposure control and exposure modulation based on body size.       [KS]   1546 Beta-Hydroxybutyrate Quant(!): 0.361 [KS]   1547 FINDINGS:    A single portable view of the chest was obtained. The cardiac silhouette  and mediastinal and hilar contours are unchanged. Lungs and pleural  spaces are clear. There is chronic appearing deformity of the proximal  left humerus. There is multilevel degenerative disease. No significant  change from earlier same date chest radiograph.    [KS]      ED Course User Index  [KA] Sanjuana Serrano PA  [KS] Gennaro Astudillo MD        Reviewed pt's history and workup with Dr. Astudillo.  After a bedside evaluation; they agree with the plan of care      Patient was placed in face mask in first look. Patient was wearing facemask each time I entered the room and throughout our encounter. I wore protective equipment throughout this patient encounter including a face mask, eye shield and gloves. Hand hygiene was performed before donning protective equipment and after removal when leaving the room.        DIAGNOSIS  Final diagnoses:   Nausea and vomiting, intractability of vomiting not specified, unspecified vomiting type   Abdominal pain, unspecified abdominal location   Gastritis without bleeding, unspecified  chronicity, unspecified gastritis type   Cirrhosis of liver without ascites, unspecified hepatic cirrhosis type (CMS/HCC)   Hyperglycemia               Latest Documented Vital Signs:  As of 18:06 EST  BP- 155/83 HR- 99 Temp- 96.9 °F (36.1 °C) (Tympanic) O2 sat- 94%       Sanjuana Serrano PA  02/03/21 [id number]

## 2021-02-04 LAB
ALBUMIN SERPL-MCNC: 3.4 G/DL (ref 3.5–5.2)
ALBUMIN/GLOB SERPL: 1.3 G/DL
ALP SERPL-CCNC: 141 U/L (ref 39–117)
ALPHA-FETOPROTEIN: 2.05 NG/ML (ref 0–8.3)
ALT SERPL W P-5'-P-CCNC: 35 U/L (ref 1–33)
ANION GAP SERPL CALCULATED.3IONS-SCNC: 9.8 MMOL/L (ref 5–15)
ANISOCYTOSIS BLD QL: ABNORMAL
AST SERPL-CCNC: 38 U/L (ref 1–32)
BACTERIA SPEC AEROBE CULT: NO GROWTH
BASOPHILS # BLD MANUAL: 0.04 10*3/MM3 (ref 0–0.2)
BASOPHILS NFR BLD AUTO: 1 % (ref 0–1.5)
BILIRUB SERPL-MCNC: 1.8 MG/DL (ref 0–1.2)
BUN SERPL-MCNC: 16 MG/DL (ref 6–20)
BUN/CREAT SERPL: 25 (ref 7–25)
CALCIUM SPEC-SCNC: 9.4 MG/DL (ref 8.6–10.5)
CHLORIDE SERPL-SCNC: 102 MMOL/L (ref 98–107)
CO2 SERPL-SCNC: 24.2 MMOL/L (ref 22–29)
CREAT SERPL-MCNC: 0.64 MG/DL (ref 0.57–1)
DEPRECATED RDW RBC AUTO: 50 FL (ref 37–54)
ERYTHROCYTE [DISTWIDTH] IN BLOOD BY AUTOMATED COUNT: 15.9 % (ref 12.3–15.4)
GFR SERPL CREATININE-BSD FRML MDRD: 95 ML/MIN/1.73
GLOBULIN UR ELPH-MCNC: 2.6 GM/DL
GLUCOSE BLDC GLUCOMTR-MCNC: 220 MG/DL (ref 70–130)
GLUCOSE BLDC GLUCOMTR-MCNC: 296 MG/DL (ref 70–130)
GLUCOSE BLDC GLUCOMTR-MCNC: 336 MG/DL (ref 70–130)
GLUCOSE BLDC GLUCOMTR-MCNC: 342 MG/DL (ref 70–130)
GLUCOSE SERPL-MCNC: 208 MG/DL (ref 65–99)
HBA1C MFR BLD: 8.97 % (ref 4.8–5.6)
HCT VFR BLD AUTO: 30.9 % (ref 34–46.6)
HGB BLD-MCNC: 11.1 G/DL (ref 12–15.9)
LYMPHOCYTES # BLD MANUAL: 0.62 10*3/MM3 (ref 0.7–3.1)
LYMPHOCYTES NFR BLD MANUAL: 15 % (ref 19.6–45.3)
LYMPHOCYTES NFR BLD MANUAL: 5 % (ref 5–12)
MCH RBC QN AUTO: 31.2 PG (ref 26.6–33)
MCHC RBC AUTO-ENTMCNC: 35.9 G/DL (ref 31.5–35.7)
MCV RBC AUTO: 86.8 FL (ref 79–97)
MICROCYTES BLD QL: ABNORMAL
MONOCYTES # BLD AUTO: 0.21 10*3/MM3 (ref 0.1–0.9)
NEUTROPHILS # BLD AUTO: 3.24 10*3/MM3 (ref 1.7–7)
NEUTROPHILS NFR BLD MANUAL: 79 % (ref 42.7–76)
PLAT MORPH BLD: NORMAL
PLATELET # BLD AUTO: 79 10*3/MM3 (ref 140–450)
PMV BLD AUTO: 10.1 FL (ref 6–12)
POTASSIUM SERPL-SCNC: 3.9 MMOL/L (ref 3.5–5.2)
PROT SERPL-MCNC: 6 G/DL (ref 6–8.5)
RBC # BLD AUTO: 3.56 10*6/MM3 (ref 3.77–5.28)
SODIUM SERPL-SCNC: 136 MMOL/L (ref 136–145)
WBC # BLD AUTO: 4.1 10*3/MM3 (ref 3.4–10.8)
WBC MORPH BLD: NORMAL

## 2021-02-04 PROCEDURE — 25010000002 MORPHINE PER 10 MG: Performed by: NURSE PRACTITIONER

## 2021-02-04 PROCEDURE — 83036 HEMOGLOBIN GLYCOSYLATED A1C: CPT | Performed by: INTERNAL MEDICINE

## 2021-02-04 PROCEDURE — 80053 COMPREHEN METABOLIC PANEL: CPT | Performed by: INTERNAL MEDICINE

## 2021-02-04 PROCEDURE — 96376 TX/PRO/DX INJ SAME DRUG ADON: CPT

## 2021-02-04 PROCEDURE — 63710000001 INSULIN LISPRO (HUMAN) PER 5 UNITS: Performed by: NURSE PRACTITIONER

## 2021-02-04 PROCEDURE — G0378 HOSPITAL OBSERVATION PER HR: HCPCS

## 2021-02-04 PROCEDURE — 90791 PSYCH DIAGNOSTIC EVALUATION: CPT

## 2021-02-04 PROCEDURE — 25010000002 CEFTRIAXONE PER 250 MG: Performed by: INTERNAL MEDICINE

## 2021-02-04 PROCEDURE — 85007 BL SMEAR W/DIFF WBC COUNT: CPT | Performed by: INTERNAL MEDICINE

## 2021-02-04 PROCEDURE — 96375 TX/PRO/DX INJ NEW DRUG ADDON: CPT

## 2021-02-04 PROCEDURE — 99214 OFFICE O/P EST MOD 30 MIN: CPT | Performed by: INTERNAL MEDICINE

## 2021-02-04 PROCEDURE — 82105 ALPHA-FETOPROTEIN SERUM: CPT | Performed by: INTERNAL MEDICINE

## 2021-02-04 PROCEDURE — 63710000001 PROMETHAZINE PER 12.5 MG: Performed by: INTERNAL MEDICINE

## 2021-02-04 PROCEDURE — 63710000001 INSULIN GLARGINE PER 5 UNITS: Performed by: INTERNAL MEDICINE

## 2021-02-04 PROCEDURE — 85025 COMPLETE CBC W/AUTO DIFF WBC: CPT | Performed by: INTERNAL MEDICINE

## 2021-02-04 PROCEDURE — 82962 GLUCOSE BLOOD TEST: CPT

## 2021-02-04 RX ORDER — OXYCODONE HYDROCHLORIDE 5 MG/1
5 TABLET ORAL EVERY 6 HOURS PRN
Status: DISCONTINUED | OUTPATIENT
Start: 2021-02-04 | End: 2021-02-07 | Stop reason: HOSPADM

## 2021-02-04 RX ADMIN — LEVETIRACETAM 500 MG: 500 TABLET, FILM COATED ORAL at 08:10

## 2021-02-04 RX ADMIN — RIFAXIMIN 550 MG: 550 TABLET ORAL at 08:10

## 2021-02-04 RX ADMIN — RIFAXIMIN 550 MG: 550 TABLET ORAL at 20:36

## 2021-02-04 RX ADMIN — SPIRONOLACTONE 100 MG: 100 TABLET, FILM COATED ORAL at 08:10

## 2021-02-04 RX ADMIN — METOCLOPRAMIDE 5 MG: 5 TABLET ORAL at 20:36

## 2021-02-04 RX ADMIN — METOCLOPRAMIDE 5 MG: 5 TABLET ORAL at 06:07

## 2021-02-04 RX ADMIN — METOCLOPRAMIDE 5 MG: 5 TABLET ORAL at 11:44

## 2021-02-04 RX ADMIN — INSULIN LISPRO 5 UNITS: 100 INJECTION, SOLUTION INTRAVENOUS; SUBCUTANEOUS at 08:09

## 2021-02-04 RX ADMIN — MIRTAZAPINE 15 MG: 15 TABLET, FILM COATED ORAL at 21:51

## 2021-02-04 RX ADMIN — INSULIN GLARGINE 80 UNITS: 100 INJECTION, SOLUTION SUBCUTANEOUS at 21:51

## 2021-02-04 RX ADMIN — MORPHINE SULFATE 2 MG: 2 INJECTION, SOLUTION INTRAMUSCULAR; INTRAVENOUS at 01:58

## 2021-02-04 RX ADMIN — GLYCERIN, HYPROMELLOSE, POLYETHYLENE GLYCOL 1 DROP: .2; .2; 1 LIQUID OPHTHALMIC at 15:51

## 2021-02-04 RX ADMIN — SUCRALFATE 1 G: 1 TABLET ORAL at 20:36

## 2021-02-04 RX ADMIN — SUCRALFATE 1 G: 1 TABLET ORAL at 06:07

## 2021-02-04 RX ADMIN — PREGABALIN 75 MG: 75 CAPSULE ORAL at 08:10

## 2021-02-04 RX ADMIN — HYDROXYZINE HYDROCHLORIDE 50 MG: 50 TABLET ORAL at 20:36

## 2021-02-04 RX ADMIN — SUCRALFATE 1 G: 1 TABLET ORAL at 11:44

## 2021-02-04 RX ADMIN — OXYCODONE 5 MG: 5 TABLET ORAL at 22:31

## 2021-02-04 RX ADMIN — PROMETHAZINE HYDROCHLORIDE 12.5 MG: 12.5 TABLET ORAL at 23:56

## 2021-02-04 RX ADMIN — INSULIN LISPRO 10 UNITS: 100 INJECTION, SOLUTION INTRAVENOUS; SUBCUTANEOUS at 16:59

## 2021-02-04 RX ADMIN — LEVETIRACETAM 500 MG: 500 TABLET, FILM COATED ORAL at 20:36

## 2021-02-04 RX ADMIN — OXYCODONE 5 MG: 5 TABLET ORAL at 12:34

## 2021-02-04 RX ADMIN — METOCLOPRAMIDE 5 MG: 5 TABLET ORAL at 16:59

## 2021-02-04 RX ADMIN — SODIUM CHLORIDE, PRESERVATIVE FREE 10 ML: 5 INJECTION INTRAVENOUS at 20:37

## 2021-02-04 RX ADMIN — PREGABALIN 75 MG: 75 CAPSULE ORAL at 21:51

## 2021-02-04 RX ADMIN — SODIUM CHLORIDE, PRESERVATIVE FREE 10 ML: 5 INJECTION INTRAVENOUS at 08:11

## 2021-02-04 RX ADMIN — PANTOPRAZOLE SODIUM 40 MG: 40 INJECTION, POWDER, FOR SOLUTION INTRAVENOUS at 06:07

## 2021-02-04 RX ADMIN — SUCRALFATE 1 G: 1 TABLET ORAL at 16:59

## 2021-02-04 RX ADMIN — CEFTRIAXONE SODIUM 1 G: 1 INJECTION, SOLUTION INTRAVENOUS at 15:51

## 2021-02-04 RX ADMIN — INSULIN LISPRO 8 UNITS: 100 INJECTION, SOLUTION INTRAVENOUS; SUBCUTANEOUS at 11:44

## 2021-02-04 NOTE — PLAN OF CARE
Goal Outcome Evaluation:  Plan of Care Reviewed With: patient  Progress: no change  Outcome Summary: One time dose of Morphine administered for pt's back pain, with relief. PRN nausea medication administered per orders, with relief. Up with assist to the BSC. Medications administered per pt request d/t being nauseous, pt was able to keep PO medications down. A&Ox4. VSS. No s/s of distress at this time. Will continue to monitor.

## 2021-02-04 NOTE — PROGRESS NOTES
Discharge Planning Assessment  [facility]     Patient Name: Silvia Zabala  MRN: [id number]  Today's Date: 2/4/2021    Admit Date: 2/3/2021    Discharge Needs Assessment     Row Name 02/04/21 0838       Living Environment    Lives With  spouse;other relative(s)    Name(s) of Who Lives With Patient  Spouse  ( Jose Zabala  [phone]) and Mother in law (Alysha)    Current Living Arrangements  home/apartment/condo    Primary Care Provided by  self    Provides Primary Care For  other (see comments) Mother in law (Alysha)    Family Caregiver if Needed  spouse    Family Caregiver Names  Spouse  ( Jose Zabala  [phone])    Quality of Family Relationships  involved;supportive    Able to Return to Prior Arrangements  yes    Living Arrangement Comments  Pt lives in a first floor apartment with spouse  ( Jose Zabala  [phone]) and mother in law (Alysha).       Resource/Environmental Concerns    Resource/Environmental Concerns  none    Transportation Concerns  car, none       Transition Planning    Patient/Family Anticipates Transition to  home with family    Patient/Family Anticipated Services at Transition  none    Transportation Anticipated  family or friend will provide       Discharge Needs Assessment    Readmission Within the Last 30 Days  no previous admission in last 30 days    Equipment Currently Used at Home  cane, straight;glucometer;grab bar;rollator;shower chair    Concerns to be Addressed  denies needs/concerns at this time    Anticipated Changes Related to Illness  none    Equipment Needed After Discharge  cane, straight;grab bar, tub/shower;glucometer;rollator;shower chair        Discharge Plan     Row Name 02/04/21 0841       Plan    Plan  Plan home with family.   STEVAN Rayo RN    Patient/Family in Agreement with Plan  yes    Plan Comments  FACE SHEET VERIFIED/ COLLINS SIGNED.  Spoke with pt at bedside.  Pt's PCP is JOEL Sher.  Pt lives in a first floor apartment with spouse  (  Jose Zabala  [phone]) and mother in law (Alysha).  Pt is independent with ADLs.   Pt has a straight cane, grab bars, glucometer, rollator, and shower chair for home use if needed.  Pt gets prescriptions at [facility] ([place]).  Pt denies any issues affording medications.  Pt is not current with HH.  Pt has not been in SNF.  Pt denies any discharge needs.  Pt states family can transport her home.  Plan home with family.   STEVAN Rayo RN        Continued Care and Services - Admitted Since 2/3/2021    Coordination has not been started for this encounter.         Demographic Summary     Row Name 02/04/21 0837       General Information    Admission Type  observation    Arrived From  emergency department    Required Notices Provided  Observation Status Notice    Referral Source  admission list    Reason for Consult  discharge planning    Preferred Language  English     Used During This Interaction  no        Functional Status     Row Name 02/04/21 0838       Functional Status    Usual Activity Tolerance  good    Current Activity Tolerance  moderate       Functional Status, IADL    Medications  independent    Meal Preparation  assistive person    Housekeeping  assistive person    Laundry  assistive person    Shopping  assistive person       Mental Status    General Appearance WDL  WDL       Mental Status Summary    Recent Changes in Mental Status/Cognitive Functioning  no changes        Psychosocial    No documentation.       Abuse/Neglect    No documentation.       Legal    No documentation.       Substance Abuse    No documentation.       Patient Forms    No documentation.           Liset Rayo, SRIKANTH

## 2021-02-04 NOTE — CONSULTS
"[facility]  Initial Inpatient Consult Note    Referring Provider: Dr. Cunningham    Reason for Consultation: Vomiting    Subjective     History of present illness:    60 y.o. female with history of anemia, anxiety, WONG liver cirrhosis (s/p TIPS procedure twice, hepatic encephalopathy, h/o bleeding from esophageal varices with banding, h/o ascites - though none since the TIPS), diabetes mellitus, fibromyalgia, esophageal varices, gastroparesis and is followed by Dr. Kimberly Gray.  The patient was admitted on 2/3/2021 with nonbloody vomiting and elevated glucose level.  The patient was found to have a urinary tract infection.       Patient does have a history of Wong cirrhosis as did her mother.  The patient did have 2 TIPS procedures in [place] the last  in 2020.  Patient does see Dr. Montes at the [facility].  She has not scheduled to see him back until 2021.  The patient last had an EGD at the [facility] in 2020.  Dr. Rob Coleman also did an EGD on her in 2019 where he found 2+ esophageal varices which he banded.  Patient's dad  of colorectal cancer 2019.  Her last colonoscopy was in .  She does have a history of diabetes mellitus.  Patient does have epigastric and substernal discomfort that is a pressure and burning type discomfort for the last 2 to 3 months.  This is worse after eating and decreased with drinking milk.  She denies nonsteroidal anti-inflammatory drug use.  Patient does have a history of hepatic encephalopathy but she says she is \"allergic to\" lactulose with lots of abdominal cramping.  Without lactulose and while on Xifaxan twice a day she will have 3-5 bowel movements a day.  She has no rectal bleeding or melena.  She is on Reglan but she fears Reglan because her dad had tardive dyskinesia.  Weight patient's weight is stable.  The patient had a CT of the abdomen and pelvis with IV contrast on 2/3/2021 that " showed:  IMPRESSION:  1. Hepatic cirrhosis with splenomegaly. No ascites. Patent TIPS stent in  place.  2. Circumferential wall thickening of the stomach that may suggest  gastritis.     These findings were discussed with Dr. Astudillo by telephone at the time of dictation.    He is a non-smoker and denies alcohol use.  She lives in [place] with her  and mother-in-law.  She has 3 children.    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   • Cirrhosis (CMS/HCC)    • Clostridium difficile infection    • Colon polyps    • Depression    • Diabetes mellitus (CMS/HCC)     Adult onset, insulin requiring   • Duodenal ulcer with hemorrhage    • Esophageal varices determined by endoscopy (CMS/HCC)    • Fibromyalgia    • Gallbladder disease    • Gastric varices    • Gastroparesis    • Glaucoma    • Granuloma annulare    • H/O B12 deficiency    • H/O Bleeding ulcer     And gastroesophageal varices   • H/O mixed connective tissue disorder    • Hemorrhoids    • Hiatal hernia    • History of transfusion     needs bendryl  flushes sensation and temp goes up   • Hx of being hospitalized     In [place] for GI bleeding from ulcer and varices   • Hyperlipidemia    • Migraine    • Mitral valve prolapse    • DUKES (nonalcoholic steatohepatitis) 11/2016   • Orthostatic hypotension 02/2019   • BRICE (obstructive sleep apnea)    • Pancreas divisum    • Pancytopenia (CMS/HCC)     Chronic, due to cirrhosis and hypersplenism   • Peptic ulcer disease     And esophageal varices   • PONV (postoperative nausea and vomiting)    • RA (rheumatoid arthritis) (CMS/HCC)    • Seizure disorder (CMS/HCC)     last 2003   • Seizures (CMS/HCC)    • Systemic lupus (CMS/HCC)    • TIA (transient ischemic attack) 1984      Past Surgical History:  Past Surgical History:   Procedure Laterality Date   • BARTHOLIN CYST MARSUPIALIZATION Left 1/18/2021    Procedure: EXCISION OF LABIAL CYST;  Surgeon: Melinda Thakkar MD;  Location: [facility] MAIN OR;  Service: Obstetrics/Gynecology;  Laterality: Left;   • BLADDER REPAIR  1991   • CATARACT EXTRACTION     • CHOLECYSTECTOMY  1994   • COLONOSCOPY     • ENDOSCOPY N/A 11/7/2016    Procedure: ESOPHAGOGASTRODUODENOSCOPY WITH COLD POLYPECTOMY, BANDING,  AND CLIPS TIMES 2;  Surgeon: Rich Coleman MD;  Location: [facility] ENDOSCOPY;  Service:    • ENDOSCOPY N/A 12/22/2016    Procedure: ESOPHAGOGASTRODUODENOSCOPY WITH ESOPHAGEAL BANDING. 5 BANDS FIRED, 4 BANDS ADHERERD TO MUCOSA;  Surgeon: Rich Coleman MD;  Location: [facility] ENDOSCOPY;  Service:    • ENDOSCOPY N/A 2/15/2017    Procedure: ESOPHAGOGASTRODUODENOSCOPY AT BEDSIDE with esophageal varices banding (3);  Surgeon: Rich Coleman MD;  Location: [facility] ENDOSCOPY;  Service:    • ENDOSCOPY N/A 4/6/2017    Procedure: ESOPHAGOGASTRODUODENOSCOPY WITH ESOPHAGEAL VARICES BANDING x2;  Surgeon: Rich Coleman MD;  Location: [facility] ENDOSCOPY;  Service:    • ENDOSCOPY N/A 11/24/2017    Procedure: ESOPHAGOGASTRODUODENOSCOPY with biopsies;  Surgeon: Rich Coleman MD;  Location: [facility] ENDOSCOPY;  Service:    • ENDOSCOPY N/A 10/5/2018    Procedure: ESOPHAGOGASTRODUODENOSCOPY;  Surgeon: Rich Coleman MD;  Location: [facility] ENDOSCOPY;  Service: Gastroenterology   • ENDOSCOPY N/A 5/10/2019    Procedure: ESOPHAGOGASTRODUODENOSCOPY AT BEDSIDE WITH VARICEAL LIGATION;  Surgeon: Rich Coleman MD;  Location: [facility] ENDOSCOPY;  Service: Gastroenterology   • ENDOSCOPY N/A 6/28/2019    Procedure: ESOPHAGOGASTRODUODENOSCOPY WITH ESOPHAGEAL BANDING (3 BANDS);  Surgeon: Rich Coleman MD;  Location: [facility] ENDOSCOPY;  Service: Gastroenterology   • ENDOSCOPY AND COLONOSCOPY  2014    Dr. Rich Coleman with findings of candida esophagitis   • EYE SURGERY   "   • HYSTERECTOMY  1986    partial   • JOINT REPLACEMENT     • KNEE SURGERY Right     \"right knee recontstruction\"   • LIVER BIOPSY  2015   • MASS EXCISION     • STOMACH SURGERY     • TIPS PROCEDURE  2020    x2      Social History:   Social History     Tobacco Use   • Smoking status: Former Smoker     Packs/day: 1.00     Years: 4.00     Pack years: 4.00     Types: Cigarettes     Quit date: 2015     Years since quittin.1   • Smokeless tobacco: Never Used   Substance Use Topics   • Alcohol use: No      Family History:  Family History   Problem Relation Age of Onset   • Liver disease Other    • Depression Other    • Heart disease Other    • Hypertension Other    • Diabetes Other    • Breast cancer Other    • Brain cancer Other    • Uterine cancer Other    • Colon cancer Other    • Leukemia Other    • Colon cancer Maternal Aunt    • Hypertension Mother    • Diabetes type II Mother    • Rheum arthritis Mother    • Liver disease Mother         DUKES   • Heart disease Father    • Diabetes type II Father    • Diabetes type II Sister    • Lupus Sister    • Malig Hyperthermia Neg Hx        Home Meds:  Medications Prior to Admission   Medication Sig Dispense Refill Last Dose   • ALPRAZolam (XANAX) 0.5 MG tablet Take 1 tablet by mouth 2 (Two) Times a Day As Needed for Anxiety. (Patient taking differently: Take 0.5 mg by mouth 2 (Two) Times a Day As Needed for Anxiety or Sleep.) 60 tablet 1 Past Week at Unknown time   • Cholecalciferol (Vitamin D3) 25 MCG (1000 UT) capsule Take 1,000 Units by mouth Daily.   Past Week at Unknown time   • cholestyramine (Questran) 4 g packet Take 2 packets by mouth 2 (Two) Times a Day With Meals.   Past Week at Unknown time   • cholestyramine light 4 g powder Take 1 packet by mouth 2 (Two) Times a Day. (Patient taking differently: Take 8 g by mouth 2 (Two) Times a Day.) 60 packet 5 Past Week at Unknown time   • Docusate Sodium (COLACE PO) Take 2 capsules by mouth every night at " bedtime.   Past Week at Unknown time   • esomeprazole (nexIUM) 40 MG capsule Take 40 mg by mouth Every Morning Before Breakfast.   Past Week at Unknown time   • ferrous sulfate 325 (65 FE) MG tablet TAKE ONE TABLET BY MOUTH TWICE A DAY (Patient taking differently: Take 325 mg by mouth 2 (two) times a day.) 60 tablet 2 Past Week at Unknown time   • hydrOXYzine (ATARAX) 25 MG tablet Take 2 tablets by mouth Every Night. 30 tablet 3 Past Week at Unknown time   • Insulin Glargine (LANTUS SOLOSTAR) 100 UNIT/ML injection pen Inject 70 units in the am and 70 units in the pm (Patient taking differently: Inject 80 Units under the skin into the appropriate area as directed 2 (Two) Times a Day. Inject 120 units in the am and 120 units in the pm) 20 pen 1 2/3/2021 at Unknown time   • levETIRAcetam (KEPPRA) 500 MG tablet Take 1 tablet by mouth 2 (Two) Times a Day. 24 tablet 0 Past Week at Unknown time   • Magnesium Oxide 400 MG capsule Take 400 mg by mouth Every Night.   Past Week at Unknown time   • metaxalone (SKELAXIN) 800 MG tablet Take 800 mg by mouth 3 (Three) Times a Day.   Past Week at Unknown time   • metoclopramide (REGLAN) 5 MG tablet Take 5 mg by mouth 4 (Four) Times a Day Before Meals & at Bedtime.   Past Week at Unknown time   • mirtazapine (REMERON) 15 MG tablet Take 15 mg by mouth Every Night.   Past Week at Unknown time   • Multiple Vitamins-Minerals (MULTIVITAMIN WITH MINERALS) tablet tablet Take 1 tablet by mouth Daily.   Past Week at Unknown time   • OxyCODONE HCl (OXAYDO) 7.5 MG tablet  Take 7.5 mg by mouth Every 8 (Eight) Hours As Needed.   Past Week at Unknown time   • polyethylene glycol (MIRALAX) powder Take 17 g by mouth Daily As Needed.   Past Week at Unknown time   • pregabalin (LYRICA) 75 MG capsule Take 75 mg by mouth 2 (Two) Times a Day.   Past Week at Unknown time   • spironolactone (ALDACTONE) 100 MG tablet TAKE ONE TABLET BY MOUTH DAILY (Patient taking differently: Take 100 mg by mouth Daily As  "Needed (swelling).) 30 tablet 2 Past Week at Unknown time   • sucralfate (CARAFATE) 1 GM/10ML suspension Take 1 g by mouth 3 (Three) Times a Day.   Past Week at Unknown time   • vitamin B-12 (CYANOCOBALAMIN) 1000 MCG tablet Take 1,000 mcg by mouth Daily.   Past Week at Unknown time   • XIFAXAN 550 MG tablet TAKE ONE TABLET BY MOUTH TWICE A DAY (Patient taking differently: Take 550 mg by mouth Every 12 (Twelve) Hours.) 60 tablet 2 Past Week at Unknown time   • Continuous Blood Gluc Sensor ([employer] Roseline Sensor System) 1 Device Every 14 (Fourteen) Days. 2 each 3    • furosemide (LASIX) 80 MG tablet Take 80 mg by mouth Daily As Needed (swelling).      • glucose blood test strip by Other route As Needed.      • Insulin Lispro, 1 Unit Dial, (HumaLOG KwikPen) 100 UNIT/ML solution pen-injector INJECT UP TO 60 UNITS THREE TIMES A DAY WITH  MEALS 30 mL 0    • Prasterone (Intrarosa) 6.5 MG insert Insert 1 each into the vagina 2 (Two) Times a Week.        Current Meds:   cefTRIAXone, 1 g, Intravenous, Q24H  hydrOXYzine, 50 mg, Oral, Nightly  insulin glargine, 80 Units, Subcutaneous, Nightly  insulin lispro, 0-14 Units, Subcutaneous, TID AC  levETIRAcetam, 500 mg, Oral, BID  metoclopramide, 5 mg, Oral, 4x Daily AC & at Bedtime  mirtazapine, 15 mg, Oral, Nightly  pantoprazole, 40 mg, Intravenous, Q AM  pregabalin, 75 mg, Oral, Q12H  riFAXIMin, 550 mg, Oral, BID  sodium chloride, 10 mL, Intravenous, Q12H  spironolactone, 100 mg, Oral, Daily  sucralfate, 1 g, Oral, 4x Daily AC & at Bedtime      Allergies:  Allergies   Allergen Reactions   • Albuterol Anaphylaxis   • Imitrex [Sumatriptan] Anaphylaxis   • Tramadol Nausea Only, Other (See Comments) and GI Intolerance     Per pt causes her \"palsy, meaning shaking and tremors\"    • Nsaids Other (See Comments)     Told by MD not to take due to liver problems   • Tylenol [Acetaminophen] Other (See Comments)     Has liver problems and told by MD to not take   • Zofran [Ondansetron Hcl] " Other (See Comments)     Pt states does not work to correct nausea and vomiting.    • Lactulose Nausea And Vomiting and Other (See Comments)     Severe abdominal pain   • Quinine Derivatives Nausea And Vomiting     Review of Systems  The following systems were reviewed and negative;  constitution, ENT, genitourinary, musculoskeletal and neurological     Objective     Vital Signs  Temp:  [96.9 °F (36.1 °C)-98.6 °F (37 °C)] 97.9 °F (36.6 °C)  Heart Rate:  [] 85  Resp:  [16-20] 16  BP: (117-164)/(59-87) 137/73  Physical Exam:  General Appearance:    Alert, cooperative, in no acute distress   Head:    Normocephalic, without obvious abnormality, atraumatic   Eyes:            Lids and lashes normal, conjunctivae and sclerae normal, no   icterus   Throat:   No oral lesions, no thrush, oral mucosa moist   Neck:   No adenopathy, supple, trachea midline, no thyromegaly, no   carotid bruit, no JVD   Lungs:     Clear to auscultation,respirations regular, even and                   unlabored    Heart:    Regular rhythm and normal rate, normal S1 and S2, no            murmur, no gallop, no rub, no click   Chest Wall:    No abnormalities observed   Abdomen:     Normal bowel sounds, no masses, no organomegaly, soft        nontender, nondistended, no guarding, no rebound                 tenderness   Rectal:     Deferred   Extremities:   no edema, no cyanosis, no redness   Skin:   No bleeding, bruising or rash   Lymph nodes:   No palpable adenopathy   Psychiatric:  Judgement and insight: normal   Orientation to person place and time: normal   Mood and affect: normal. Minimal asterixis.   Results Review:   I reviewed the patient's new clinical results.    Results from last 7 days   Lab Units 02/04/21  0557 02/03/21  1139   WBC 10*3/mm3 4.10 6.13   HEMOGLOBIN g/dL 11.1* 13.2   HEMATOCRIT % 30.9* 37.4   PLATELETS 10*3/mm3 79* 87*     Results from last 7 days   Lab Units 02/04/21  0557 02/03/21  1139   SODIUM mmol/L 136 131*    POTASSIUM mmol/L 3.9 3.7   CHLORIDE mmol/L 102 96*   CO2 mmol/L 24.2 22.7   BUN mg/dL 16 21*   CREATININE mg/dL 0.64 0.85   CALCIUM mg/dL 9.4 9.7   BILIRUBIN mg/dL 1.8* 2.2*   ALK PHOS U/L 141* 186*   ALT (SGPT) U/L 35* 40*   AST (SGOT) U/L 38* 38*   GLUCOSE mg/dL 208* 298*     Results from last 7 days   Lab Units 21  1139   INR  1.15*     Lab Results   Lab Value Date/Time    LIPASE 14 2021 1139    LIPASE 8 (L) 2019 2043    LIPASE 9 (L) 2019 1259    LIPASE 8 (L) 2018 1900    LIPASE 12 (L) 10/02/2018 2332    LIPASE 9 (L) 2018 0425    LIPASE 22 2017 1650    LIPASE 10 (L) 10/30/2017 1732    LIPASE 9 (L) 2017 1202    LIPASE 16 11/10/2014 0724       Radiology:  XR Chest AP   Final Result      CT Abdomen Pelvis With Contrast   Preliminary Result   1. Hepatic cirrhosis with splenomegaly. No ascites. Patent TIPS stent in   place.   2. Circumferential wall thickening of the stomach that may suggest   gastritis.       These findings were discussed with Dr. Astudillo by telephone at the time of   dictation.       Radiation dose reduction techniques were utilized, including automated   exposure control and exposure modulation based on body size.              XR Chest 1 View   Final Result   No acute process.       This report was finalized on 2/3/2021 12:27 PM by Dr. Hema Sampson M.D.              Assessment/Plan   Assessment:   1. 60 y.o. female with history of anemia, anxiety, DUKES liver cirrhosis (s/p TIPS procedure twice, hepatic encephalopathy, h/o bleeding from esophageal varices with banding, h/o ascites - though none since the TIPS), diabetes mellitus, fibromyalgia, esophageal varices, gastroparesis.  Her meld score is 11.  2.  Could the nausea and nonbloody vomiting be from the urinary tract infection?  He does have a history of gastroparesis.  3.  She has a family history of colorectal cancer in that her dad  of colorectal cancer.  4.  She has epigastric and  substernal discomfort.  She is status post cholecystectomy.      Plan:   1.  I will check an alpha-fetoprotein level.  2.  She would like to decrease to a full liquid diet.  3.  I will check a troponin level.  4.  I would continue the Xifaxan.  5.  I agree with continuing the Protonix and Carafate.  6.  I would continue the IV Reglan for now.  7.  I am going to hold off on the EGD for now since she had one in 9 of 2020.  If the vomiting were to persist we could always do an another EGD.    I discussed the patient's findings and my recommendations with patient.         Endy Vasquez M.D.  [facility]  [address] [place] [address]  Office: [phone]

## 2021-02-04 NOTE — NURSING NOTE
Called Gastroenterology to verify if the pt's consult was called, consult called prior to this shift.
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
## 2021-02-04 NOTE — NURSING NOTE
Called and spoke with [facility] about pt's statements. RN was told that they will follow up with pt.

## 2021-02-04 NOTE — PROGRESS NOTES
Continued Stay Note  [facility]     Patient Name: Silvia Zabala  MRN: [id number]  Today's Date: 2/4/2021    Admit Date: 2/3/2021    Discharge Plan     Row Name 02/04/21 0841       Plan    Plan  Plan home with family.   STEVAN Rayo RN    Patient/Family in Agreement with Plan  yes    Plan Comments  FACE SHEET VERIFIED/ COLLINS SIGNED.  Spoke with pt at bedside.  Pt's PCP is JOEL Sher.  Pt lives in a first floor apartment with spouse  ( Jose Zabala  [phone]) and mother in law (Alysha).  Pt is independent with ADLs.   Pt has a straight cane, grab bars, glucometer, rollator, and shower chair for home use if needed.  Pt gets prescriptions at [facility] ([place]  Pt denies any issues affording medications.  Pt is not current with HH.  Pt has not been in SNF.  Pt denies any discharge needs.  Pt states family can transport her home.  Plan home with family.   STEVAN Rayo RN        Discharge Codes    No documentation.             Liset Rayo RN

## 2021-02-04 NOTE — PLAN OF CARE
Goal Outcome Evaluation:  Plan of Care Reviewed With: patient     Outcome Summary: Tolerating liquid diet. Medicated for migraine x 1 . c/o left eye pain and dryness. Eye gtts instilled. Blood sugars noted and treated with SS. Telemetry SR.

## 2021-02-04 NOTE — CONSULTS
.     REASON FOR CONSULTATION:   abnormal labs  Provide an opinion on any further workup or treatment                             REQUESTING PHYSICIAN: Mina Gonzalez MD  RECORDS OBTAINED:  Records of the patients history including those from the electronic medical record were reviewed and summarized in detail.    HISTORY OF PRESENT ILLNESS:  The patient is a 60 y.o. year old female  who is here for follow-up with the above-mentioned history.    Admitted 2/3/2021 because she could not keep any food down and came to the emergency room.  Significant hyperglycemia.  Also has hyperbilirubinemia and transaminitis.  Found to have a UTI.  Recent TI PS procedure done.    Admitted for acute gastritis.  GI was consulted.    Consult order states that we are consulted for abnormal labs.  Therefore, I am assuming we are consulted due to her chronic thrombocytopenia.  Her PLT seems to be within baseline, 79.  Hb also seems to be within baseline.    Patient states she asked the hospitalist for this consult because she wanted to see if she needed any IV iron while she was here.  She states recently she had to hold her oral iron and she thought she might need some IV iron while she was here.    Denies bleeding.    She states she intermittently has this issue of not being able to keep any food down.  She states this is due to gastroparesis.  She states she has already improved since admission.    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   • Cirrhosis (CMS/HCC)    • Clostridium difficile infection    • Colon polyps    • Depression    • Diabetes mellitus (CMS/HCC)     Adult onset, insulin requiring   • Duodenal ulcer with hemorrhage    • Esophageal varices determined by endoscopy (CMS/HCC)    • Fibromyalgia     • Gallbladder disease    • Gastric varices    • Gastroparesis    • Glaucoma    • Granuloma annulare    • H/O B12 deficiency    • H/O Bleeding ulcer     And gastroesophageal varices   • H/O mixed connective tissue disorder    • Hemorrhoids    • Hiatal hernia    • History of transfusion     needs bendryl  flushes sensation and temp goes up   • Hx of being hospitalized     In [place] for GI bleeding from ulcer and varices   • Hyperlipidemia    • Migraine    • Mitral valve prolapse    • DUKES (nonalcoholic steatohepatitis) 11/2016   • Orthostatic hypotension 02/2019   • BRICE (obstructive sleep apnea)    • Pancreas divisum    • Pancytopenia (CMS/HCC)     Chronic, due to cirrhosis and hypersplenism   • Peptic ulcer disease     And esophageal varices   • PONV (postoperative nausea and vomiting)    • RA (rheumatoid arthritis) (CMS/HCC)    • Seizure disorder (CMS/HCC)     last 2003   • Seizures (CMS/HCC)    • Systemic lupus (CMS/HCC)    • TIA (transient ischemic attack) 1984     Past Surgical History:   Procedure Laterality Date   • BARTHOLIN CYST MARSUPIALIZATION Left 1/18/2021    Procedure: EXCISION OF LABIAL CYST;  Surgeon: Melinda Thakkar MD;  Location: [facility] MAIN OR;  Service: Obstetrics/Gynecology;  Laterality: Left;   • BLADDER REPAIR  1991   • CATARACT EXTRACTION     • CHOLECYSTECTOMY  1994   • COLONOSCOPY     • ENDOSCOPY N/A 11/7/2016    Procedure: ESOPHAGOGASTRODUODENOSCOPY WITH COLD POLYPECTOMY, BANDING,  AND CLIPS TIMES 2;  Surgeon: Rich Coleman MD;  Location: [facility] ENDOSCOPY;  Service:    • ENDOSCOPY N/A 12/22/2016    Procedure: ESOPHAGOGASTRODUODENOSCOPY WITH ESOPHAGEAL BANDING. 5 BANDS FIRED, 4 BANDS ADHERERD TO MUCOSA;  Surgeon: Rich Coleman MD;  Location: [facility] ENDOSCOPY;  Service:    • ENDOSCOPY N/A 2/15/2017    Procedure: ESOPHAGOGASTRODUODENOSCOPY AT BEDSIDE with esophageal varices banding (3);  Surgeon: Rich Coleman MD;  Location: [facility] ENDOSCOPY;  Service:    • ENDOSCOPY N/A 4/6/2017  "   Procedure: ESOPHAGOGASTRODUODENOSCOPY WITH ESOPHAGEAL VARICES BANDING x2;  Surgeon: Rich Coleman MD;  Location: [facility] ENDOSCOPY;  Service:    • ENDOSCOPY N/A 11/24/2017    Procedure: ESOPHAGOGASTRODUODENOSCOPY with biopsies;  Surgeon: Rich Coleman MD;  Location: [facility] ENDOSCOPY;  Service:    • ENDOSCOPY N/A 10/5/2018    Procedure: ESOPHAGOGASTRODUODENOSCOPY;  Surgeon: Rich Coleman MD;  Location:  [facility] ENDOSCOPY;  Service: Gastroenterology   • ENDOSCOPY N/A 5/10/2019    Procedure: ESOPHAGOGASTRODUODENOSCOPY AT BEDSIDE WITH VARICEAL LIGATION;  Surgeon: Rich Coleman MD;  Location: [facility] ENDOSCOPY;  Service: Gastroenterology   • ENDOSCOPY N/A 6/28/2019    Procedure: ESOPHAGOGASTRODUODENOSCOPY WITH ESOPHAGEAL BANDING (3 BANDS);  Surgeon: Rich Coleman MD;  Location: [facility] ENDOSCOPY;  Service: Gastroenterology   • ENDOSCOPY AND COLONOSCOPY  2014    Dr. Rich Coleman with findings of candida esophagitis   • EYE SURGERY     • HYSTERECTOMY  1986    partial   • JOINT REPLACEMENT     • KNEE SURGERY Right 1995    \"right knee recontstruction\"   • LIVER BIOPSY  01/2015   • MASS EXCISION     • STOMACH SURGERY     • TIPS PROCEDURE  2020    x2       MEDICATIONS    Current Facility-Administered Medications:   •  ALPRAZolam (XANAX) tablet 0.5 mg, 0.5 mg, Oral, BID PRN, Mina Gonzalez MD  •  cefTRIAXone (ROCEPHIN) IVPB 1 g, 1 g, Intravenous, Q24H, Mina Gonzalez MD  •  dextrose (D50W) 25 g/ 50mL Intravenous Solution 25 g, 25 g, Intravenous, Q15 Min PRN, Karlene Fernandez APRN  •  dextrose (GLUTOSE) oral gel 15 g, 15 g, Oral, Q15 Min PRN, Karlene Fernandez APRN  •  furosemide (LASIX) tablet 80 mg, 80 mg, Oral, Daily PRN, Mina Gonzalez MD  •  glucagon (human recombinant) (GLUCAGEN DIAGNOSTIC) injection 1 mg, 1 mg, Subcutaneous, PRN, Karlene Fernandez, APRN  •  Glycerin-Hypromellose- (ARTIFICIAL TEARS) 0.2-0.2-1 % ophthalmic solution solution 1 drop, 1 drop, Left Eye, Q1H PRN, Rivera Cunningham " MD Hayley  •  hydrOXYzine (ATARAX) tablet 50 mg, 50 mg, Oral, Nightly, Mina Gonzalez MD, 50 mg at 02/03/21 2117  •  insulin glargine (LANTUS, SEMGLEE) injection 80 Units, 80 Units, Subcutaneous, Nightly, Mina Gonzalez MD, 80 Units at 02/03/21 2120  •  insulin lispro (humaLOG, ADMELOG) injection 0-14 Units, 0-14 Units, Subcutaneous, TID AC, English, Karlene C, APRN, 8 Units at 02/04/21 1144  •  levETIRAcetam (KEPPRA) tablet 500 mg, 500 mg, Oral, BID, Mina Gonzalez MD, 500 mg at 02/04/21 0810  •  metoclopramide (REGLAN) tablet 5 mg, 5 mg, Oral, 4x Daily AC & at Bedtime, Mina Gonzalez MD, 5 mg at 02/04/21 1144  •  mirtazapine (REMERON) tablet 15 mg, 15 mg, Oral, Nightly, Mina Gonzalez MD  •  oxyCODONE (ROXICODONE) immediate release tablet 5 mg, 5 mg, Oral, Q6H PRN, Rivera Cunningham MD  •  pantoprazole (PROTONIX) injection 40 mg, 40 mg, Intravenous, Q AM, Mina Gonzalez MD, 40 mg at 02/04/21 0607  •  pregabalin (LYRICA) capsule 75 mg, 75 mg, Oral, Q12H, Mina Gonzalez MD, 75 mg at 02/04/21 0810  •  promethazine (PHENERGAN) tablet 12.5 mg, 12.5 mg, Oral, Q6H PRN, Mina Gonzalez MD, 12.5 mg at 02/03/21 1952  •  riFAXIMin (XIFAXAN) tablet 550 mg, 550 mg, Oral, BID, Mina Gonzalez MD, 550 mg at 02/04/21 0810  •  sodium chloride 0.9 % flush 10 mL, 10 mL, Intravenous, PRN, Emergency, Triage Protocol, MD  •  sodium chloride 0.9 % flush 10 mL, 10 mL, Intravenous, Q12H, Mina Gonzalez MD, 10 mL at 02/04/21 0811  •  sodium chloride 0.9 % flush 10 mL, 10 mL, Intravenous, PRN, Mina Gonzalez MD  •  spironolactone (ALDACTONE) tablet 100 mg, 100 mg, Oral, Daily, Mina Gonzalez MD, 100 mg at 02/04/21 0810  •  sucralfate (CARAFATE) tablet 1 g, 1 g, Oral, 4x Daily AC & at Bedtime, Mina Gonzalez MD, 1 g at 02/04/21 1144    ALLERGIES:     Allergies   Allergen Reactions   • Albuterol Anaphylaxis   • Imitrex [Sumatriptan] Anaphylaxis   • Tramadol Nausea Only, Other (See Comments) and GI  "Intolerance     Per pt causes her \"palsy, meaning shaking and tremors\"    • Nsaids Other (See Comments)     Told by MD not to take due to liver problems   • Tylenol [Acetaminophen] Other (See Comments)     Has liver problems and told by MD to not take   • Zofran [Ondansetron Hcl] Other (See Comments)     Pt states does not work to correct nausea and vomiting.    • Lactulose Nausea And Vomiting and Other (See Comments)     Severe abdominal pain   • Quinine Derivatives Nausea And Vomiting       SOCIAL HISTORY:       Social History     Socioeconomic History   • Marital status:      Spouse name: Jose   • Number of children: Not on file   • Years of education: Not on file   • Highest education level: Not on file   Occupational History     Employer: DISABLED   Tobacco Use   • Smoking status: Former Smoker     Packs/day: 1.00     Years: 4.00     Pack years: 4.00     Types: Cigarettes     Quit date: 2015     Years since quittin.1   • Smokeless tobacco: Never Used   Substance and Sexual Activity   • Alcohol use: No   • Drug use: Never   • Sexual activity: Defer   Social History Narrative    Moved to [place] from [place]. She is currently medically disabled.         FAMILY HISTORY:  Family History   Problem Relation Age of Onset   • Liver disease Other    • Depression Other    • Heart disease Other    • Hypertension Other    • Diabetes Other    • Breast cancer Other    • Brain cancer Other    • Uterine cancer Other    • Colon cancer Other    • Leukemia Other    • Colon cancer Maternal Aunt    • Hypertension Mother    • Diabetes type II Mother    • Rheum arthritis Mother    • Liver disease Mother         DUKES   • Heart disease Father    • Diabetes type II Father    • Diabetes type II Sister    • Lupus Sister    • Malig Hyperthermia Neg Hx        REVIEW OF SYSTEMS:  Review of Systems   Constitutional: Negative for activity change.   HENT: Negative for nosebleeds and trouble swallowing.    Respiratory: " Negative for shortness of breath and wheezing.    Cardiovascular: Negative for chest pain and palpitations.   Gastrointestinal: Negative for constipation, diarrhea and nausea.   Genitourinary: Negative for dysuria and hematuria.   Musculoskeletal: Negative for arthralgias and myalgias.   Skin: Negative for rash and wound.   Neurological: Negative for seizures and syncope.   Hematological: Negative for adenopathy. Does not bruise/bleed easily.   Psychiatric/Behavioral: Negative for confusion.              Vitals:    02/03/21 2117 02/03/21 2341 02/04/21 0157 02/04/21 0733   BP: 157/80 117/64 123/59 137/73   BP Location: Right arm Right arm Left arm Right arm   Patient Position: Lying Lying Lying Lying   Pulse: 92 89 89 85   Resp: 18 18 18 16   Temp:  98.6 °F (37 °C) 98.6 °F (37 °C) 97.9 °F (36.6 °C)   TempSrc:  Oral Oral Oral   SpO2: 97% 94% 96% 96%   Weight:       Height:         Current Status 12/4/2020   ECOG score 1      PHYSICAL EXAM:    CONSTITUTIONAL:  Vital signs reviewed.  No distress, looks comfortable.  EYES:  Conjunctiva and lids unremarkable.  PERRLA  EARS,NOSE,MOUTH,THROAT:  Ears and nose appear unremarkable.  Lips, teeth, gums appear unremarkable.  RESPIRATORY:  Normal respiratory effort.  Lungs clear to auscultation bilaterally.  CARDIOVASCULAR:  Normal S1, S2.  No murmurs rubs or gallops.  No significant lower extremity edema.  GASTROINTESTINAL: Abdomen appears unremarkable.  Nontender.  No hepatomegaly.  No splenomegaly.  NEURO: cranial nerves 2-12 grossly intact.  No focal deficits.  Appears to have equal strength all 4 extremities.  MUSCULOSKELETAL:  Unremarkable digits/nails.  No cyanosis or clubbing.  SKIN:  Warm.  No rashes.  PSYCHIATRIC:  Normal judgment and insight.  Normal mood and affect.     RECENT LABS:        WBC   Date Value Ref Range Status   02/04/2021 4.10 3.40 - 10.80 10*3/mm3 Final   02/03/2021 6.13 3.40 - 10.80 10*3/mm3 Final     Hemoglobin   Date Value Ref Range Status    02/04/2021 11.1 (L) 12.0 - 15.9 g/dL Final   02/03/2021 13.2 12.0 - 15.9 g/dL Final     Platelets   Date Value Ref Range Status   02/04/2021 79 (L) 140 - 450 10*3/mm3 Final   02/03/2021 87 (L) 140 - 450 10*3/mm3 Final       Assessment/Plan   Silvia Zabala [id number]        *Thrombocytopenia.     · Follows with Dr. Mcdowell in our office.  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  · He states baseline PLT is .  · PLT is 79, within baseline.     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  Hb 11.1     *B12 deficiency.  On oral B12 since December 2013.     *History of esophageal varices banding.  She since has undergone a TI PS procedure     *Iron deficiency anemia due to GI bleeding.  Her hemoglobin is improved with oral iron supplementation.  Last iron labs 1/15/2021, normal.  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  This is of unknown significance.     *Rheumatoid arthritis.       *Lupus     *Diabetes.  Hyperglycemia from steroids may be a problem.       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      *Overweight.  This contributes to Dukes which contributes to more cirrhosis which contributes to cytopenias.  She should lose weight.  Body mass index is 27.42 kg/m².  BMI 25 to <30 is overweight  BMI 30 to <35 is class 1 obesity  BMI 35 to <40 is class 2 obesity  BMI 40 or higher is class 3 obesity         Plan  · No need for IV iron (patient states she requested this consult because she wanted to know if she needed IV iron).  · Blood counts within baseline.  · Transfuse if needed.  · Keep already scheduled follow-up in the office for monthly labs and follow-up with Dr. Mcdowell, her usual outpatient hematologist.    Chart reviewed and summarized including admission H&P and Dr. Mcdowell's last  note       I don't think I'm adding much at this point.  Will sign off.  Please call if I can be of any further assistance.  Thanks for the opportunity to help.

## 2021-02-04 NOTE — NURSING NOTE
"Called and spoke with JOEL Sanchez, about pt's suicide screening. RN spoke with pt who stated that she has had thoughts of wanting to end her life because she has been so sick, but \"could never do this because of her family\". RN clearly asked the suicide screening which states (PAST MONTH). Pt states that it was in the past month but it is NOT at this time. Pt states that she has no wants or thoughts of wanting to hurt herself at this time.   RN explained that the order keeps popping up for RN to order suicide prevention orders.   JOEL Sanchez, stated that if pt is not having these thoughts right now then we do not need to order suicide prevention orders but to consult access to see her. Will continue to monitor.  "

## 2021-02-04 NOTE — H&P
Patient Name:  Silvia Zabala  YOB: 1960  MRN:  [id number]  Admit Date:  2/3/2021  Patient Care Team:  Kulwant Martínez APRN as PCP - General (Internal Medicine)  Sukhdeep Mcdowell MD as Consulting Physician (Hematology and Oncology)  Merary Burnett MD as Consulting Physician (Endocrinology)  Kimberly Gray MD as Consulting Physician (Gastroenterology)  Joe Munoz MD as Consulting Physician (Rheumatology)  Melinda Thakkar MD as Consulting Physician (Obstetrics and Gynecology)  Silvestre Montes MD PhD as Consulting Physician (Gastroenterology)      Subjective   History Present Illness     Chief Complaint   Patient presents with   • Vomiting   • Headache   • Hyperglycemia              History of Present Illness   60-year-old female, with history of anemia, anxiety, liver cirrhosis, diabetes mellitus, fibromyalgia, esophageal varices, gastroparesis, is seen in the hospital today with main complaints of nausea and vomiting.  Patient upon further evaluation in the emergency room, states that she has been unable to keep anything down.  Patient does have significant hyperglycemia in the emergency room.  She does exhibit hyperbilirubinemia and transaminitis.  Patient will be admitted to hospitalist service for further workup of symptoms.  Upon review of urinalysis, she has findings consistent with UTI.  Patient has had recent tips procedure done, she has been doing fairly okay. Over last 2-3 days she has been feeling really sick.  She denies fever, chills.        Review of Systems     Constitutional: Positive for activity change, appetite change,   Negative for chills and fatigue.   HENT: Negative for congestion, drooling, ear pain and hearing loss.    Eyes: Negative for discharge and itching.   Respiratory: Negative for apnea, choking and chest tightness.    Cardiovascular: negative for palpitations. Negative for chest pain.   Gastrointestinal: Positive for nausea and diarrhea.    Endocrine: Negative for cold intolerance and heat intolerance.   Genitourinary: Negative for flank pain and frequency.   Musculoskeletal: Negative for arthralgias and myalgias.   Skin: Negative for color change and pallor.   Allergic/Immunologic: Negative for environmental allergies and food allergies.   Neurological: Negative for dizziness and facial asymmetry.   Hematological: Negative for adenopathy. Does not bruise/bleed easily.   Psychiatric/Behavioral: Negative for agitation and behavioral problems.     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   • Cirrhosis (CMS/HCC)    • Clostridium difficile infection    • Colon polyps    • Depression    • Diabetes mellitus (CMS/HCC)     Adult onset, insulin requiring   • Duodenal ulcer with hemorrhage    • Esophageal varices determined by endoscopy (CMS/HCC)    • Fibromyalgia    • Gallbladder disease    • Gastric varices    • Gastroparesis    • Glaucoma    • Granuloma annulare    • H/O B12 deficiency    • H/O Bleeding ulcer     And gastroesophageal varices   • H/O mixed connective tissue disorder    • Hemorrhoids    • Hiatal hernia    • History of transfusion     needs bendryl  flushes sensation and temp goes up   • Hx of being hospitalized     In [place] for GI bleeding from ulcer and varices   • Hyperlipidemia    • Migraine    • Mitral valve prolapse    • DUKES (nonalcoholic steatohepatitis) 11/2016   • Orthostatic hypotension 02/2019   • BRICE (obstructive sleep apnea)    • Pancreas divisum    • Pancytopenia (CMS/HCC)     Chronic, due to cirrhosis and hypersplenism   • Peptic ulcer disease     And esophageal varices   • PONV (postoperative nausea and vomiting)    • RA (rheumatoid arthritis) (CMS/HCC)    • Seizure disorder  (CMS/HCC)     last 2003   • Seizures (CMS/HCC)    • Systemic lupus (CMS/HCC)    • TIA (transient ischemic attack) 1984     Past Surgical History:   Procedure Laterality Date   • BARTHOLIN CYST MARSUPIALIZATION Left 1/18/2021    Procedure: EXCISION OF LABIAL CYST;  Surgeon: Melinda Thakkar MD;  Location: [facility] MAIN OR;  Service: Obstetrics/Gynecology;  Laterality: Left;   • BLADDER REPAIR  1991   • CATARACT EXTRACTION     • CHOLECYSTECTOMY  1994   • COLONOSCOPY     • ENDOSCOPY N/A 11/7/2016    Procedure: ESOPHAGOGASTRODUODENOSCOPY WITH COLD POLYPECTOMY, BANDING,  AND CLIPS TIMES 2;  Surgeon: Rich Coleman MD;  Location: [facility] ENDOSCOPY;  Service:    • ENDOSCOPY N/A 12/22/2016    Procedure: ESOPHAGOGASTRODUODENOSCOPY WITH ESOPHAGEAL BANDING. 5 BANDS FIRED, 4 BANDS ADHERERD TO MUCOSA;  Surgeon: Rich Coleman MD;  Location: [facility] ENDOSCOPY;  Service:    • ENDOSCOPY N/A 2/15/2017    Procedure: ESOPHAGOGASTRODUODENOSCOPY AT BEDSIDE with esophageal varices banding (3);  Surgeon: Rich Coleman MD;  Location: [facility] ENDOSCOPY;  Service:    • ENDOSCOPY N/A 4/6/2017    Procedure: ESOPHAGOGASTRODUODENOSCOPY WITH ESOPHAGEAL VARICES BANDING x2;  Surgeon: Rich Coleman MD;  Location: [facility] ENDOSCOPY;  Service:    • ENDOSCOPY N/A 11/24/2017    Procedure: ESOPHAGOGASTRODUODENOSCOPY with biopsies;  Surgeon: Rich Coleman MD;  Location: [facility] ENDOSCOPY;  Service:    • ENDOSCOPY N/A 10/5/2018    Procedure: ESOPHAGOGASTRODUODENOSCOPY;  Surgeon: Rich Coleman MD;  Location: [facility] ENDOSCOPY;  Service: Gastroenterology   • ENDOSCOPY N/A 5/10/2019    Procedure: ESOPHAGOGASTRODUODENOSCOPY AT BEDSIDE WITH VARICEAL LIGATION;  Surgeon: Rich Coleman MD;  Location: [facility] ENDOSCOPY;  Service: Gastroenterology   • ENDOSCOPY N/A 6/28/2019    Procedure: ESOPHAGOGASTRODUODENOSCOPY WITH ESOPHAGEAL BANDING (3 BANDS);  Surgeon: Rich Coleman MD;  Location: [facility] ENDOSCOPY;  Service: Gastroenterology   • ENDOSCOPY  "AND COLONOSCOPY      Dr. Rich Coleman with findings of candida esophagitis   • EYE SURGERY     • HYSTERECTOMY  1986    partial   • JOINT REPLACEMENT     • KNEE SURGERY Right     \"right knee recontstruction\"   • LIVER BIOPSY  2015   • MASS EXCISION     • STOMACH SURGERY     • TIPS PROCEDURE  2020    x2     Family History   Problem Relation Age of Onset   • Liver disease Other    • Depression Other    • Heart disease Other    • Hypertension Other    • Diabetes Other    • Breast cancer Other    • Brain cancer Other    • Uterine cancer Other    • Colon cancer Other    • Leukemia Other    • Colon cancer Maternal Aunt    • Hypertension Mother    • Diabetes type II Mother    • Rheum arthritis Mother    • Liver disease Mother         DUKES   • Heart disease Father    • Diabetes type II Father    • Diabetes type II Sister    • Lupus Sister    • Malig Hyperthermia Neg Hx      Social History     Tobacco Use   • Smoking status: Former Smoker     Packs/day: 1.00     Years: 4.00     Pack years: 4.00     Types: Cigarettes     Quit date: 2015     Years since quittin.1   • Smokeless tobacco: Never Used   Substance Use Topics   • Alcohol use: No   • Drug use: Never     No current facility-administered medications on file prior to encounter.      Current Outpatient Medications on File Prior to Encounter   Medication Sig Dispense Refill   • ALPRAZolam (XANAX) 0.5 MG tablet Take 1 tablet by mouth 2 (Two) Times a Day As Needed for Anxiety. (Patient taking differently: Take 0.5 mg by mouth 2 (Two) Times a Day As Needed for Anxiety or Sleep.) 60 tablet 1   • Cholecalciferol (Vitamin D3) 25 MCG (1000 UT) capsule Take 1,000 Units by mouth Daily.     • cholestyramine (Questran) 4 g packet Take 2 packets by mouth 2 (Two) Times a Day With Meals.     • cholestyramine light 4 g powder Take 1 packet by mouth 2 (Two) Times a Day. (Patient taking differently: Take 8 g by mouth 2 (Two) Times a Day.) 60 packet 5   • Docusate " Sodium (COLACE PO) Take 2 capsules by mouth every night at bedtime.     • esomeprazole (nexIUM) 40 MG capsule Take 40 mg by mouth Every Morning Before Breakfast.     • ferrous sulfate 325 (65 FE) MG tablet TAKE ONE TABLET BY MOUTH TWICE A DAY (Patient taking differently: Take 325 mg by mouth 2 (two) times a day.) 60 tablet 2   • hydrOXYzine (ATARAX) 25 MG tablet Take 2 tablets by mouth Every Night. 30 tablet 3   • Insulin Glargine (LANTUS SOLOSTAR) 100 UNIT/ML injection pen Inject 70 units in the am and 70 units in the pm (Patient taking differently: Inject 80 Units under the skin into the appropriate area as directed 2 (Two) Times a Day. Inject 120 units in the am and 120 units in the pm) 20 pen 1   • levETIRAcetam (KEPPRA) 500 MG tablet Take 1 tablet by mouth 2 (Two) Times a Day. 24 tablet 0   • Magnesium Oxide 400 MG capsule Take 400 mg by mouth Every Night.     • metaxalone (SKELAXIN) 800 MG tablet Take 800 mg by mouth 3 (Three) Times a Day.     • metoclopramide (REGLAN) 5 MG tablet Take 5 mg by mouth 4 (Four) Times a Day Before Meals & at Bedtime.     • mirtazapine (REMERON) 15 MG tablet Take 15 mg by mouth Every Night.     • Multiple Vitamins-Minerals (MULTIVITAMIN WITH MINERALS) tablet tablet Take 1 tablet by mouth Daily.     • OxyCODONE HCl (OXAYDO) 7.5 MG tablet  Take 7.5 mg by mouth Every 8 (Eight) Hours As Needed.     • polyethylene glycol (MIRALAX) powder Take 17 g by mouth Daily As Needed.     • pregabalin (LYRICA) 75 MG capsule Take 75 mg by mouth 2 (Two) Times a Day.     • spironolactone (ALDACTONE) 100 MG tablet TAKE ONE TABLET BY MOUTH DAILY (Patient taking differently: Take 100 mg by mouth Daily As Needed (swelling).) 30 tablet 2   • sucralfate (CARAFATE) 1 GM/10ML suspension Take 1 g by mouth 3 (Three) Times a Day.     • vitamin B-12 (CYANOCOBALAMIN) 1000 MCG tablet Take 1,000 mcg by mouth Daily.     • XIFAXAN 550 MG tablet TAKE ONE TABLET BY MOUTH TWICE A DAY (Patient taking differently: Take 550  "mg by mouth Every 12 (Twelve) Hours.) 60 tablet 2   • Continuous Blood Gluc Sensor (FreeStyle Roseline Sensor System) 1 Device Every 14 (Fourteen) Days. 2 each 3   • furosemide (LASIX) 80 MG tablet Take 80 mg by mouth Daily As Needed (swelling).     • glucose blood test strip by Other route As Needed.     • Insulin Lispro, 1 Unit Dial, (HumaLOG KwikPen) 100 UNIT/ML solution pen-injector INJECT UP TO 60 UNITS THREE TIMES A DAY WITH  MEALS 30 mL 0   • Prasterone (Intrarosa) 6.5 MG insert Insert 1 each into the vagina 2 (Two) Times a Week.       Allergies   Allergen Reactions   • Albuterol Anaphylaxis   • Imitrex [Sumatriptan] Anaphylaxis   • Tramadol Nausea Only, Other (See Comments) and GI Intolerance     Per pt causes her \"palsy, meaning shaking and tremors\"    • Nsaids Other (See Comments)     Told by MD not to take due to liver problems   • Tylenol [Acetaminophen] Other (See Comments)     Has liver problems and told by MD to not take   • Zofran [Ondansetron Hcl] Other (See Comments)     Pt states does not work to correct nausea and vomiting.    • Lactulose Nausea And Vomiting and Other (See Comments)     Severe abdominal pain   • Quinine Derivatives Nausea And Vomiting       Objective    Objective     Vital Signs  Temp:  [96.9 °F (36.1 °C)-98.3 °F (36.8 °C)] 98.3 °F (36.8 °C)  Heart Rate:  [] 98  Resp:  [16-20] 16  BP: (149-164)/(81-87) 163/86  SpO2:  [94 %-99 %] 99 %  on   ;   Device (Oxygen Therapy): room air  Body mass index is 27.42 kg/m².    Physical Exam     General Appearance:    Alert, cooperative, no distress, AAOx3  Head:    Normocephalic, without obvious abnormality, atraumatic  Eyes:    PERRL, conjunctiva/corneas clear, EOM's intact, both eyes  Throat:   Lips, tongue, gums normal; oral mucosa pink and moist  Neck:   Supple, symmetrical, trachea midline, no JVD  Lungs:     Clear to auscultation bilaterally, respirations unlabored  Chest Wall:    No tenderness or deformity   Heart:    Regular rate and " rhythm, S1 and S2 normal, no murmur,no  Rub or gallop  Abdomen:      Epigastric tenderness, generalized tenderness.    Extremities:   Extremities normal, atraumatic, no cyanosis or edema  Pulses:   Pulses palpable in all extremities  Skin:   Skin is warm and dry,  no rashes or palpable lesions  Neurologic:   CNII-XII intact, motor strength grossly intact, sensation grossly intact to light touch, no focal deficits noted        Results Review:  I reviewed the patient's new clinical results.  I reviewed the patient's new imaging results and agree with the interpretation.  I reviewed the patient's other test results and agree with the interpretation  I personally viewed and interpreted the patient's EKG/Telemetry data  Discussed with ED provider.    Lab Results (last 24 hours)     Procedure Component Value Units Date/Time    POC Glucose Once [[id number]]  (Abnormal) Collected: 02/03/21 1124    Specimen: Blood Updated: 02/03/21 1144     Glucose 309 mg/dL     CBC & Differential [[id number]]  (Abnormal) Collected: 02/03/21 1139    Specimen: Blood Updated: 02/03/21 1226    Narrative:      The following orders were created for panel order CBC & Differential.  Procedure                               Abnormality         Status                     ---------                               -----------         ------                     CBC Auto Differential[[id number]]        Abnormal            Final result                 Please view results for these tests on the individual orders.    Comprehensive Metabolic Panel [[id number]]  (Abnormal) Collected: 02/03/21 1139    Specimen: Blood Updated: 02/03/21 1229     Glucose 298 mg/dL      BUN 21 mg/dL      Creatinine 0.85 mg/dL      Sodium 131 mmol/L      Potassium 3.7 mmol/L      Chloride 96 mmol/L      CO2 22.7 mmol/L      Calcium 9.7 mg/dL      Total Protein 6.9 g/dL      Albumin 4.20 g/dL      ALT (SGPT) 40 U/L      AST (SGOT) 38 U/L      Alkaline Phosphatase 186 U/L      Total  Bilirubin 2.2 mg/dL      eGFR Non African Amer 69 mL/min/1.73      Globulin 2.7 gm/dL      A/G Ratio 1.6 g/dL      BUN/Creatinine Ratio 24.7     Anion Gap 12.3 mmol/L     Narrative:      GFR Normal >60  Chronic Kidney Disease <60  Kidney Failure <15      Magnesium [[id number]]  (Normal) Collected: 02/03/21 1139    Specimen: Blood Updated: 02/03/21 1228     Magnesium 2.0 mg/dL     Phosphorus [[id number]]  (Normal) Collected: 02/03/21 1139    Specimen: Blood Updated: 02/03/21 1228     Phosphorus 2.7 mg/dL     Ketone Bodies, Serum (Not performed at [place]) [[id number]]  (Abnormal) Collected: 02/03/21 1139    Specimen: Blood Updated: 02/03/21 1229    Narrative:      The following orders were created for panel order Ketone Bodies, Serum (Not performed at [place]).  Procedure                               Abnormality         Status                     ---------                               -----------         ------                     Beta Hydroxybutyrate Javier...[[id number]]  Abnormal            Final result                 Please view results for these tests on the individual orders.    Lipase [[id number]]  (Normal) Collected: 02/03/21 1139    Specimen: Blood Updated: 02/03/21 1228     Lipase 14 U/L     Troponin [[id number]]  (Normal) Collected: 02/03/21 1139    Specimen: Blood Updated: 02/03/21 1226     Troponin T <0.010 ng/mL     Narrative:      Troponin T Reference Range:  <= 0.03 ng/mL-   Negative for AMI  >0.03 ng/mL-     Abnormal for myocardial necrosis.  Clinicians would have to utilize clinical acumen, EKG, Troponin and serial changes to determine if it is an Acute Myocardial Infarction or myocardial injury due to an underlying chronic condition.       Results may be falsely decreased if patient taking Biotin.      Beta Hydroxybutyrate Quantitative [[id number]]  (Abnormal) Collected: 02/03/21 1139    Specimen: Blood Updated: 02/03/21 1229     Beta-Hydroxybutyrate Quant 0.361 mmol/L     Narrative:      In the  assessment of possible diabetic ketoacidosis, the test should be interpreted along with other clinical and laboratory findings.  A level greater than 1 mmol/L should require further evaluation and levels of more than 3 mmol/L require immediate medical review.    CBC Auto Differential [[id number]]  (Abnormal) Collected: 02/03/21 1139    Specimen: Blood Updated: 02/03/21 1226     WBC 6.13 10*3/mm3      RBC 4.23 10*6/mm3      Hemoglobin 13.2 g/dL      Hematocrit 37.4 %      MCV 88.4 fL      MCH 31.2 pg      MCHC 35.3 g/dL      RDW 16.2 %      RDW-SD 52.1 fl      MPV 11.3 fL      Platelets 87 10*3/mm3      Neutrophil % 76.6 %      Lymphocyte % 12.1 %      Monocyte % 8.8 %      Eosinophil % 0.2 %      Basophil % 1.0 %      Immature Grans % 1.3 %      Neutrophils, Absolute 4.70 10*3/mm3      Lymphocytes, Absolute 0.74 10*3/mm3      Monocytes, Absolute 0.54 10*3/mm3      Eosinophils, Absolute 0.01 10*3/mm3      Basophils, Absolute 0.06 10*3/mm3      Immature Grans, Absolute 0.08 10*3/mm3      nRBC 0.0 /100 WBC     Protime-INR [[id number]]  (Abnormal) Collected: 02/03/21 1139    Specimen: Blood Updated: 02/03/21 1446     Protime 14.5 Seconds      INR 1.15    aPTT [[id number]]  (Normal) Collected: 02/03/21 1139    Specimen: Blood Updated: 02/03/21 1446     PTT 27.8 seconds     Urinalysis With Microscopic If Indicated (No Culture) - Urine, Clean Catch [[id number]]  (Abnormal) Collected: 02/03/21 1301    Specimen: Urine, Clean Catch Updated: 02/03/21 1355     Color, UA Yellow     Appearance, UA Clear     pH, UA 5.5     Specific Gravity, UA 1.010     Glucose,  mg/dL (1+)     Ketones, UA Negative     Bilirubin, UA Negative     Blood, UA Small (1+)     Protein, UA 30 mg/dL (1+)     Leuk Esterase, UA Moderate (2+)     Nitrite, UA Negative     Urobilinogen, UA 0.2 E.U./dL    Urinalysis, Microscopic Only - Urine, Clean Catch [[id number]]  (Abnormal) Collected: 02/03/21 1301    Specimen: Urine, Clean Catch Updated: 02/03/21 1443      RBC, UA 13-20 /HPF      WBC, UA 21-30 /HPF      Bacteria, UA 2+ /HPF      Squamous Epithelial Cells, UA 3-6 /HPF      Hyaline Casts, UA 3-6 /LPF      Methodology Manual Light Microscopy    Urine Culture - Urine, Urine, Clean Catch [[id number]] Collected: 02/03/21 1301    Specimen: Urine, Clean Catch Updated: 02/03/21 1605    POC Glucose Once [[id number]]  (Abnormal) Collected: 02/03/21 1317    Specimen: Blood Updated: 02/03/21 1319     Glucose 318 mg/dL     POC Glucose Once [[id number]]  (Abnormal) Collected: 02/03/21 1430    Specimen: Blood Updated: 02/03/21 1431     Glucose 310 mg/dL     Respiratory Panel PCR w/COVID-19(SARS-CoV-2) MARY KATE/TONNY/DANNY/PAD/COR/MAD/MANINDER In-House, NP Swab in UTM/VTM, 3-4 HR TAT - Swab, Nasopharynx [[id number]]  (Normal) Collected: 02/03/21 1440    Specimen: Swab from Nasopharynx Updated: 02/03/21 1559     ADENOVIRUS, PCR Not Detected     Coronavirus 229E Not Detected     Coronavirus HKU1 Not Detected     Coronavirus NL63 Not Detected     Coronavirus OC43 Not Detected     COVID19 Not Detected     Human Metapneumovirus Not Detected     Human Rhinovirus/Enterovirus Not Detected     Influenza A PCR Not Detected     Influenza B PCR Not Detected     Parainfluenza Virus 1 Not Detected     Parainfluenza Virus 2 Not Detected     Parainfluenza Virus 3 Not Detected     Parainfluenza Virus 4 Not Detected     RSV, PCR Not Detected     Bordetella pertussis pcr Not Detected     Bordetella parapertussis PCR Not Detected     Chlamydophila pneumoniae PCR Not Detected     Mycoplasma pneumo by PCR Not Detected    Narrative:      Fact sheet for providers: https://docs.[employer]/wp-content/uploads/ZCE8827-5871-IQ7.1-EUA-Provider-Fact-Sheet-3.pdf    Fact sheet for patients: https://docs.[employer]/wp-content/uploads/FJF4063-1969-RT8.1-EUA-Patient-Fact-Sheet-1.pdf    Test performed by PCR.    Ammonia [[id number]]  (Normal) Collected: 02/03/21 1445    Specimen: Blood Updated: 02/03/21 1512     Ammonia 31 umol/L      POC Glucose Once [[id number]]  (Abnormal) Collected: 02/03/21 2005    Specimen: Blood Updated: 02/03/21 2007     Glucose 337 mg/dL           Imaging Results (Last 24 Hours)     Procedure Component Value Units Date/Time    XR Chest AP [[id number]] Collected: 02/03/21 1438     Updated: 02/03/21 1442    Narrative:      XR CHEST AP-     HISTORY:  Covid evaluation, cough, fever.     COMPARISON:  Chest radiograph 2/3/2021 at 11:51 AM.     FINDINGS:    A single portable view of the chest was obtained. The cardiac silhouette  and mediastinal and hilar contours are unchanged. Lungs and pleural  spaces are clear. There is chronic appearing deformity of the proximal  left humerus. There is multilevel degenerative disease. No significant  change from earlier same date chest radiograph.     This report was finalized on 2/3/2021 2:39 PM by Dr. Anisha Martinez M.D.       CT Abdomen Pelvis With Contrast [[id number]] Collected: 02/03/21 1406     Updated: 02/03/21 1406    Narrative:      CT ABDOMEN AND PELVIS WITH CONTRAST     HISTORY: Generalized abdominal pain and vomiting.     TECHNIQUE: Axial CT images of the abdomen and pelvis were obtained  following administration of intravenous contrast. The patient was not  given oral contrast Coronal and sagittal reformats were obtained.     COMPARISON: CT abdomen and pelvis from 10/03/2018     FINDINGS: The liver demonstrates a nodular outline consistent with  cirrhosis. A TIPS stent is in place. The stent appears to be patent.  Evaluation of liver parenchyma is limited due to single phase imaging.  The gallbladder is surgically absent. The spleen is enlarged measuring  14.1 cm in craniocaudal and 18.4 cm in AP diameter. Small low-density  lesions within it are favored to represent benign cysts. There is  dilatation of the splenic vein, as well as the splenoportal confluence.  The pancreas appears to be atrophic. Bilateral adrenal glands and  kidneys are unremarkable. There is no ascites. No  evidence of bowel  obstruction. Diffuse wall thickening of the stomach may suggest  gastritis. There is mixed fluid and stool content throughout the colon.  The small bowel loops are unremarkable. Moderate calcified plaque is  seen within the abdominal aorta.       Impression:      1. Hepatic cirrhosis with splenomegaly. No ascites. Patent TIPS stent in  place.  2. Circumferential wall thickening of the stomach that may suggest  gastritis.     These findings were discussed with Dr. Astudillo by telephone at the time of  dictation.     Radiation dose reduction techniques were utilized, including automated  exposure control and exposure modulation based on body size.          XR Chest 1 View [[id number]] Collected: 02/03/21 1224     Updated: 02/03/21 1230    Narrative:      XR CHEST 1 VW-  02/03/2021     HISTORY: Chest pain.     The heart size is within normal limits. Lungs appear free of acute  infiltrates. Old proximal left humerus fracture is seen. Bony structures  are otherwise unremarkable.       Impression:      No acute process.     This report was finalized on 2/3/2021 12:27 PM by Dr. Hema Sampson M.D.                  ECG 12 Lead   Final Result   HEART RATE= 99  bpm   RR Interval= 604  ms   VA Interval= 159  ms   P Horizontal Axis= 12  deg   P Front Axis= 33  deg   QRSD Interval= 134  ms   QT Interval= 393  ms   QRS Axis= 97  deg   T Wave Axis= 16  deg   - ABNORMAL ECG -   Sinus rhythm   RBBB and LPFB , NEW COMPARE TO OLD EKG   Electronically Signed By: Kade Pride ([facility], [place]) 03-Feb-2021 13:31:21   Date and Time of Study: 2021-02-03 11:50:58           Assessment/Plan     Active Hospital Problems    Diagnosis  POA   • **Acute gastritis [K29.00]  Yes   • Transaminitis [R74.01]  Yes   • Hyperbilirubinemia [E80.6]  Yes   • BRICE (obstructive sleep apnea) [G47.33]  Yes   • Diabetic peripheral neuropathy (CMS/HCC) [E11.42]  Yes   • Gastroparesis [K31.84]  Yes   • Portal hypertension (CMS/HCC) [K76.6]  Yes   •  Hyperlipidemia [E78.5]  Yes   • Nausea & vomiting [R11.2]  Yes   • Hypersplenism [D73.1]  Yes   • Type 2 diabetes mellitus, uncontrolled, with neuropathy (CMS/HCC) [E11.40, E11.65]  Yes   • Anxiety and depression [F41.9, F32.9]  Yes   • Cirrhosis of liver (CMS/HCC) [K74.60]  Yes      Resolved Hospital Problems   No resolved problems to display.        Assessment plan  1. Acute gastritis, admit patient to medicine service.  Gastroenterology consultation requested.  Start IV Protonix.  2.  Transaminitis associated with hyperbilirubinemia, patient has underlying liver cirrhosis and hypersplenism.  Continue to recheck labs.  Supportive management at this point of time.  3.  Urinary tract infection, start IV Rocephin, follow urine cultures.  4.  Diabetes mellitus, poorly controlled.  Start patient on Lantus as well as sliding scale insulin coverage and titrate insulin based on blood glucose trend.  Check hemoglobin A1c.  5.  History of gastroparesis, continue patient on Reglan.  6.  Diabetic neuropathy, continue Lyrica.  7.  Liver cirrhosis, continue Aldactone and Rifaximin should be continued.  8.  Code status is full code.  DVT prophylaxis.    Mina Gonzalez MD  [facility]  02/03/21  20:22 EST

## 2021-02-05 LAB
ALBUMIN SERPL-MCNC: 3.7 G/DL (ref 3.5–5.2)
ALBUMIN/GLOB SERPL: 1.2 G/DL
ALP SERPL-CCNC: 158 U/L (ref 39–117)
ALT SERPL W P-5'-P-CCNC: 43 U/L (ref 1–33)
ANION GAP SERPL CALCULATED.3IONS-SCNC: 7.6 MMOL/L (ref 5–15)
AST SERPL-CCNC: 44 U/L (ref 1–32)
BASOPHILS # BLD AUTO: 0.03 10*3/MM3 (ref 0–0.2)
BASOPHILS NFR BLD AUTO: 0.7 % (ref 0–1.5)
BILIRUB SERPL-MCNC: 1.7 MG/DL (ref 0–1.2)
BUN SERPL-MCNC: 15 MG/DL (ref 6–20)
BUN/CREAT SERPL: 17.6 (ref 7–25)
CALCIUM SPEC-SCNC: 9.2 MG/DL (ref 8.6–10.5)
CHLORIDE SERPL-SCNC: 104 MMOL/L (ref 98–107)
CO2 SERPL-SCNC: 24.4 MMOL/L (ref 22–29)
CREAT SERPL-MCNC: 0.85 MG/DL (ref 0.57–1)
DEPRECATED RDW RBC AUTO: 51.1 FL (ref 37–54)
EOSINOPHIL # BLD AUTO: 0.02 10*3/MM3 (ref 0–0.4)
EOSINOPHIL NFR BLD AUTO: 0.5 % (ref 0.3–6.2)
ERYTHROCYTE [DISTWIDTH] IN BLOOD BY AUTOMATED COUNT: 16.4 % (ref 12.3–15.4)
GFR SERPL CREATININE-BSD FRML MDRD: 69 ML/MIN/1.73
GLOBULIN UR ELPH-MCNC: 3 GM/DL
GLUCOSE BLDC GLUCOMTR-MCNC: 217 MG/DL (ref 70–130)
GLUCOSE BLDC GLUCOMTR-MCNC: 242 MG/DL (ref 70–130)
GLUCOSE BLDC GLUCOMTR-MCNC: 331 MG/DL (ref 70–130)
GLUCOSE BLDC GLUCOMTR-MCNC: 454 MG/DL (ref 70–130)
GLUCOSE BLDC GLUCOMTR-MCNC: 485 MG/DL (ref 70–130)
GLUCOSE SERPL-MCNC: 199 MG/DL (ref 65–99)
HAV IGM SERPL QL IA: NORMAL
HBV CORE IGM SERPL QL IA: NORMAL
HBV SURFACE AG SERPL QL IA: NORMAL
HCT VFR BLD AUTO: 35.7 % (ref 34–46.6)
HCV AB SER DONR QL: NORMAL
HGB BLD-MCNC: 12.6 G/DL (ref 12–15.9)
LYMPHOCYTES # BLD AUTO: 0.88 10*3/MM3 (ref 0.7–3.1)
LYMPHOCYTES NFR BLD AUTO: 21 % (ref 19.6–45.3)
MCH RBC QN AUTO: 30.9 PG (ref 26.6–33)
MCHC RBC AUTO-ENTMCNC: 35.3 G/DL (ref 31.5–35.7)
MCV RBC AUTO: 87.5 FL (ref 79–97)
MONOCYTES # BLD AUTO: 0.39 10*3/MM3 (ref 0.1–0.9)
MONOCYTES NFR BLD AUTO: 9.3 % (ref 5–12)
NEUTROPHILS NFR BLD AUTO: 2.82 10*3/MM3 (ref 1.7–7)
NEUTROPHILS NFR BLD AUTO: 67.1 % (ref 42.7–76)
PLATELET # BLD AUTO: 91 10*3/MM3 (ref 140–450)
PMV BLD AUTO: 10.5 FL (ref 6–12)
POTASSIUM SERPL-SCNC: 3.7 MMOL/L (ref 3.5–5.2)
PROT SERPL-MCNC: 6.7 G/DL (ref 6–8.5)
RBC # BLD AUTO: 4.08 10*6/MM3 (ref 3.77–5.28)
SODIUM SERPL-SCNC: 136 MMOL/L (ref 136–145)
TROPONIN T SERPL-MCNC: <0.01 NG/ML (ref 0–0.03)
WBC # BLD AUTO: 4.2 10*3/MM3 (ref 3.4–10.8)

## 2021-02-05 PROCEDURE — 85025 COMPLETE CBC W/AUTO DIFF WBC: CPT | Performed by: INTERNAL MEDICINE

## 2021-02-05 PROCEDURE — 99214 OFFICE O/P EST MOD 30 MIN: CPT | Performed by: INTERNAL MEDICINE

## 2021-02-05 PROCEDURE — 84484 ASSAY OF TROPONIN QUANT: CPT | Performed by: INTERNAL MEDICINE

## 2021-02-05 PROCEDURE — 63710000001 INSULIN LISPRO (HUMAN) PER 5 UNITS: Performed by: NURSE PRACTITIONER

## 2021-02-05 PROCEDURE — 82962 GLUCOSE BLOOD TEST: CPT

## 2021-02-05 PROCEDURE — 96376 TX/PRO/DX INJ SAME DRUG ADON: CPT

## 2021-02-05 PROCEDURE — 63710000001 INSULIN GLARGINE PER 5 UNITS: Performed by: INTERNAL MEDICINE

## 2021-02-05 PROCEDURE — 80074 ACUTE HEPATITIS PANEL: CPT | Performed by: INTERNAL MEDICINE

## 2021-02-05 PROCEDURE — G0378 HOSPITAL OBSERVATION PER HR: HCPCS

## 2021-02-05 PROCEDURE — 80053 COMPREHEN METABOLIC PANEL: CPT | Performed by: INTERNAL MEDICINE

## 2021-02-05 RX ORDER — DULOXETIN HYDROCHLORIDE 30 MG/1
30 CAPSULE, DELAYED RELEASE ORAL DAILY
Status: DISCONTINUED | OUTPATIENT
Start: 2021-02-05 | End: 2021-02-07 | Stop reason: HOSPADM

## 2021-02-05 RX ADMIN — METOCLOPRAMIDE 5 MG: 5 TABLET ORAL at 06:14

## 2021-02-05 RX ADMIN — PREGABALIN 75 MG: 75 CAPSULE ORAL at 20:44

## 2021-02-05 RX ADMIN — METOCLOPRAMIDE 5 MG: 5 TABLET ORAL at 17:30

## 2021-02-05 RX ADMIN — INSULIN LISPRO 14 UNITS: 100 INJECTION, SOLUTION INTRAVENOUS; SUBCUTANEOUS at 17:29

## 2021-02-05 RX ADMIN — HYDROXYZINE HYDROCHLORIDE 50 MG: 50 TABLET ORAL at 20:44

## 2021-02-05 RX ADMIN — OXYCODONE 5 MG: 5 TABLET ORAL at 12:29

## 2021-02-05 RX ADMIN — PREGABALIN 75 MG: 75 CAPSULE ORAL at 08:09

## 2021-02-05 RX ADMIN — SUCRALFATE 1 G: 1 TABLET ORAL at 20:44

## 2021-02-05 RX ADMIN — INSULIN GLARGINE 80 UNITS: 100 INJECTION, SOLUTION SUBCUTANEOUS at 20:44

## 2021-02-05 RX ADMIN — OXYCODONE 5 MG: 5 TABLET ORAL at 20:54

## 2021-02-05 RX ADMIN — SUCRALFATE 1 G: 1 TABLET ORAL at 17:30

## 2021-02-05 RX ADMIN — RIFAXIMIN 550 MG: 550 TABLET ORAL at 20:44

## 2021-02-05 RX ADMIN — SUCRALFATE 1 G: 1 TABLET ORAL at 11:52

## 2021-02-05 RX ADMIN — METOCLOPRAMIDE 5 MG: 5 TABLET ORAL at 20:44

## 2021-02-05 RX ADMIN — SPIRONOLACTONE 100 MG: 100 TABLET, FILM COATED ORAL at 08:09

## 2021-02-05 RX ADMIN — SODIUM CHLORIDE, PRESERVATIVE FREE 10 ML: 5 INJECTION INTRAVENOUS at 20:47

## 2021-02-05 RX ADMIN — METOCLOPRAMIDE 5 MG: 5 TABLET ORAL at 11:52

## 2021-02-05 RX ADMIN — INSULIN LISPRO 10 UNITS: 100 INJECTION, SOLUTION INTRAVENOUS; SUBCUTANEOUS at 11:52

## 2021-02-05 RX ADMIN — OXYCODONE 5 MG: 5 TABLET ORAL at 04:31

## 2021-02-05 RX ADMIN — RIFAXIMIN 550 MG: 550 TABLET ORAL at 08:09

## 2021-02-05 RX ADMIN — LEVETIRACETAM 500 MG: 500 TABLET, FILM COATED ORAL at 08:09

## 2021-02-05 RX ADMIN — DULOXETINE HYDROCHLORIDE 30 MG: 30 CAPSULE, DELAYED RELEASE ORAL at 16:04

## 2021-02-05 RX ADMIN — SUCRALFATE 1 G: 1 TABLET ORAL at 06:14

## 2021-02-05 RX ADMIN — INSULIN LISPRO 5 UNITS: 100 INJECTION, SOLUTION INTRAVENOUS; SUBCUTANEOUS at 08:09

## 2021-02-05 RX ADMIN — SODIUM CHLORIDE, PRESERVATIVE FREE 10 ML: 5 INJECTION INTRAVENOUS at 08:09

## 2021-02-05 RX ADMIN — MIRTAZAPINE 15 MG: 15 TABLET, FILM COATED ORAL at 20:44

## 2021-02-05 RX ADMIN — PANTOPRAZOLE SODIUM 40 MG: 40 INJECTION, POWDER, FOR SOLUTION INTRAVENOUS at 06:14

## 2021-02-05 RX ADMIN — LEVETIRACETAM 500 MG: 500 TABLET, FILM COATED ORAL at 20:44

## 2021-02-05 NOTE — PLAN OF CARE
Goal Outcome Evaluation:  Plan of Care Reviewed With: patient  Progress: no change  Outcome Summary: Medicated for abdominal pain and nausea, with relief. A&Ox4. Medications administered per orders. Up with assist to the BSC. Medications administered per orders. Tolerating full liquid diet at this time. VSS. No s/s of distress at this time. Will continue to monitor.
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
## 2021-02-05 NOTE — CONSULTS
"IDENTIFYING INFORMATION: The patient is a 60-year-old white female admitted with acute gastritis.  She is seen by psychiatry related to symptoms of depression and anxiety    CHIEF COMPLAINT: None given    INFORMANT: Patient and chart    RELIABILITY: Fair    HISTORY OF PRESENT ILLNESS: The patient is a 60-year-old white female admitted on 2/3/2021 with acute gastritis.  The patient reports a history of nonalcoholic cirrhotic liver disease is also being treated for urinary tract infection and diabetes mellitus.  The patient reports symptoms of depression related to her health status.  She also reports that she is concerned about her grandchildren as her daughter has become addicted to drugs and was recently arrested related thereto.  Patient denies suicidal or homicidal ideations at this time but does report there are times when she \"wishes God would take me\".  She is currently prescribed Remeron and as needed Xanax.  She denies abuse of any psychoactive substances.  She lives with her .  She complains that she cannot get her mind to shut off and that she worries constantly.  She denies recent changes in sleep or appetite.    PAST PSYCHIATRIC HISTORY: The patient is never been psychiatrically hospitalized.  She denies prior suicide attempts or gestures.  She was prescribed Xanax and Remeron while living in [place] and continues to take these medications    PAST MEDICAL HISTORY: Significant for gastritis, urinary tract infection, diabetes mellitus, gastroparesis, diabetic neuropathy, nonalcoholic cirrhotic liver disease    MEDICATIONS:   Current Facility-Administered Medications   Medication Dose Route Frequency Provider Last Rate Last Admin   • ALPRAZolam (XANAX) tablet 0.5 mg  0.5 mg Oral BID PRN Mina Gonzalez MD       • cefTRIAXone (ROCEPHIN) IVPB 1 g  1 g Intravenous Q24H Mina Gonzalez MD   Stopped at 02/04/21 2038   • dextrose (D50W) 25 g/ 50mL Intravenous Solution 25 g  25 g Intravenous Q15 Min PRN " Karlene Fernandez, JOEL       • dextrose (GLUTOSE) oral gel 15 g  15 g Oral Q15 Min PRN Karlene Fernandez APRN       • DULoxetine (CYMBALTA) DR capsule 30 mg  30 mg Oral Daily Sukhdeep Rodarte III, MD       • furosemide (LASIX) tablet 80 mg  80 mg Oral Daily PRN Mina Gonzalez MD       • glucagon (human recombinant) (GLUCAGEN DIAGNOSTIC) injection 1 mg  1 mg Subcutaneous PRN Karlene Fernandez APRN       • Glycerin-Hypromellose- (ARTIFICIAL TEARS) 0.2-0.2-1 % ophthalmic solution solution 1 drop  1 drop Left Eye Q1H PRN Rivera Cunningham MD   1 drop at 02/04/21 1551   • hydrOXYzine (ATARAX) tablet 50 mg  50 mg Oral Nightly Mina Gonzalez MD   50 mg at 02/04/21 2036   • insulin glargine (LANTUS, SEMGLEE) injection 80 Units  80 Units Subcutaneous Nightly Mina Gonzalez MD   80 Units at 02/04/21 2151   • insulin lispro (humaLOG, ADMELOG) injection 0-14 Units  0-14 Units Subcutaneous TID AC Karlene Fernandez APRN   10 Units at 02/05/21 1152   • levETIRAcetam (KEPPRA) tablet 500 mg  500 mg Oral BID Mina Gonzalez MD   500 mg at 02/05/21 0809   • metoclopramide (REGLAN) tablet 5 mg  5 mg Oral 4x Daily AC & at Bedtime Mina Gonzalez MD   5 mg at 02/05/21 1152   • mirtazapine (REMERON) tablet 15 mg  15 mg Oral Nightly Mina Gonzalez MD   15 mg at 02/04/21 2151   • oxyCODONE (ROXICODONE) immediate release tablet 5 mg  5 mg Oral Q6H PRN Rivera Cunningham MD   5 mg at 02/05/21 1229   • pantoprazole (PROTONIX) injection 40 mg  40 mg Intravenous Q AM Mina Gonzalez MD   40 mg at 02/05/21 0614   • pregabalin (LYRICA) capsule 75 mg  75 mg Oral Q12H Mina Gonzalez MD   75 mg at 02/05/21 0809   • promethazine (PHENERGAN) tablet 12.5 mg  12.5 mg Oral Q6H PRN Mina Gonzalez MD   12.5 mg at 02/04/21 2356   • riFAXIMin (XIFAXAN) tablet 550 mg  550 mg Oral BID Mina Gonzalez MD   550 mg at 02/05/21 0809   • sodium chloride 0.9 % flush 10 mL  10 mL Intravenous PRN Emergency, Triage  Protocol, MD       • sodium chloride 0.9 % flush 10 mL  10 mL Intravenous Q12H Mina Gonzalez MD   10 mL at 02/05/21 0809   • sodium chloride 0.9 % flush 10 mL  10 mL Intravenous PRN Mina Gonzalez MD       • spironolactone (ALDACTONE) tablet 100 mg  100 mg Oral Daily Mina Gonzalez MD   100 mg at 02/05/21 0809   • sucralfate (CARAFATE) tablet 1 g  1 g Oral 4x Daily AC & at Bedtime Mina Gonzalez MD   1 g at 02/05/21 1152         ALLERGIES: The patient listed 8 medical allergies which can be obtained from the chart    FAMILY HISTORY: Noncontributory    SOCIAL HISTORY: The patient lives with her .  There is no reported use of alcohol tobacco or street drugs.    MENTAL STATUS EXAM: The patient is a well-developed well-nourished white female appearing her stated age.  She is no apparent physical distress at the time of examination.  She is awake alert and oriented in all spheres.  Her mood is mildly dysphoric her affect congruent.  Speech is relevant and coherent.  There are no deficits memory cognition noted.  Intelligence is judged to be in the average range based on fund of knowledge, the patient is cooperative throughout the interview.  She denies current suicidal homicidal ideations or psychotic symptoms.  Her judgment insight appear to be intact.    ASSETS/LIABILITIES: Motivation for change/health and family related issues    DIAGNOSTIC IMPRESSION: Major depressive disorder recurrent moderate, multiple medical problems described previously    PLAN: I believe the patient would benefit from the addition of Cymbalta at initial dose of 30 mg daily.  This medication works well with Remeron and should help with the patient's symptoms of depression, anergy and anxiety.  I will continue to follow the patient with you, and have suggested to her that follow-up with a psychiatrist and therapist following discharge would be beneficial for her.    Thank you for the opportunity to see this patient.

## 2021-02-06 ENCOUNTER — APPOINTMENT (OUTPATIENT)
Dept: GENERAL RADIOLOGY | Facility: HOSPITAL | Age: 61
End: 2021-02-06

## 2021-02-06 LAB
BASOPHILS # BLD AUTO: 0.02 10*3/MM3 (ref 0–0.2)
BASOPHILS NFR BLD AUTO: 0.6 % (ref 0–1.5)
DEPRECATED RDW RBC AUTO: 52.1 FL (ref 37–54)
EOSINOPHIL # BLD AUTO: 0.02 10*3/MM3 (ref 0–0.4)
EOSINOPHIL NFR BLD AUTO: 0.6 % (ref 0.3–6.2)
ERYTHROCYTE [DISTWIDTH] IN BLOOD BY AUTOMATED COUNT: 16.1 % (ref 12.3–15.4)
GLUCOSE BLDC GLUCOMTR-MCNC: 210 MG/DL (ref 70–130)
GLUCOSE BLDC GLUCOMTR-MCNC: 240 MG/DL (ref 70–130)
GLUCOSE BLDC GLUCOMTR-MCNC: 249 MG/DL (ref 70–130)
GLUCOSE BLDC GLUCOMTR-MCNC: 398 MG/DL (ref 70–130)
GLUCOSE BLDC GLUCOMTR-MCNC: 417 MG/DL (ref 70–130)
HCT VFR BLD AUTO: 31.5 % (ref 34–46.6)
HGB BLD-MCNC: 10.9 G/DL (ref 12–15.9)
LYMPHOCYTES # BLD AUTO: 0.71 10*3/MM3 (ref 0.7–3.1)
LYMPHOCYTES NFR BLD AUTO: 20.8 % (ref 19.6–45.3)
MCH RBC QN AUTO: 31 PG (ref 26.6–33)
MCHC RBC AUTO-ENTMCNC: 34.6 G/DL (ref 31.5–35.7)
MCV RBC AUTO: 89.5 FL (ref 79–97)
MONOCYTES # BLD AUTO: 0.37 10*3/MM3 (ref 0.1–0.9)
MONOCYTES NFR BLD AUTO: 10.8 % (ref 5–12)
NEUTROPHILS NFR BLD AUTO: 2.25 10*3/MM3 (ref 1.7–7)
NEUTROPHILS NFR BLD AUTO: 65.7 % (ref 42.7–76)
PLATELET # BLD AUTO: 79 10*3/MM3 (ref 140–450)
PMV BLD AUTO: 10.6 FL (ref 6–12)
RBC # BLD AUTO: 3.52 10*6/MM3 (ref 3.77–5.28)
WBC # BLD AUTO: 3.42 10*3/MM3 (ref 3.4–10.8)

## 2021-02-06 PROCEDURE — 82962 GLUCOSE BLOOD TEST: CPT

## 2021-02-06 PROCEDURE — 99214 OFFICE O/P EST MOD 30 MIN: CPT | Performed by: INTERNAL MEDICINE

## 2021-02-06 PROCEDURE — 63710000001 INSULIN GLARGINE PER 5 UNITS: Performed by: INTERNAL MEDICINE

## 2021-02-06 PROCEDURE — 63710000001 INSULIN LISPRO (HUMAN) PER 5 UNITS: Performed by: NURSE PRACTITIONER

## 2021-02-06 PROCEDURE — 74220 X-RAY XM ESOPHAGUS 1CNTRST: CPT

## 2021-02-06 PROCEDURE — 85025 COMPLETE CBC W/AUTO DIFF WBC: CPT | Performed by: INTERNAL MEDICINE

## 2021-02-06 PROCEDURE — 96376 TX/PRO/DX INJ SAME DRUG ADON: CPT

## 2021-02-06 PROCEDURE — G0378 HOSPITAL OBSERVATION PER HR: HCPCS

## 2021-02-06 PROCEDURE — 63710000001 PROMETHAZINE PER 12.5 MG: Performed by: INTERNAL MEDICINE

## 2021-02-06 RX ORDER — INSULIN LISPRO 100 [IU]/ML
0-14 INJECTION, SOLUTION INTRAVENOUS; SUBCUTANEOUS
Status: DISCONTINUED | OUTPATIENT
Start: 2021-02-06 | End: 2021-02-07 | Stop reason: HOSPADM

## 2021-02-06 RX ADMIN — SODIUM CHLORIDE, PRESERVATIVE FREE 10 ML: 5 INJECTION INTRAVENOUS at 20:17

## 2021-02-06 RX ADMIN — INSULIN LISPRO 5 UNITS: 100 INJECTION, SOLUTION INTRAVENOUS; SUBCUTANEOUS at 17:41

## 2021-02-06 RX ADMIN — PREGABALIN 75 MG: 75 CAPSULE ORAL at 20:17

## 2021-02-06 RX ADMIN — LEVETIRACETAM 500 MG: 500 TABLET, FILM COATED ORAL at 08:39

## 2021-02-06 RX ADMIN — PREGABALIN 75 MG: 75 CAPSULE ORAL at 08:39

## 2021-02-06 RX ADMIN — METOCLOPRAMIDE 5 MG: 5 TABLET ORAL at 08:39

## 2021-02-06 RX ADMIN — SPIRONOLACTONE 100 MG: 100 TABLET, FILM COATED ORAL at 08:39

## 2021-02-06 RX ADMIN — PANTOPRAZOLE SODIUM 40 MG: 40 INJECTION, POWDER, FOR SOLUTION INTRAVENOUS at 08:38

## 2021-02-06 RX ADMIN — SUCRALFATE 1 G: 1 TABLET ORAL at 12:39

## 2021-02-06 RX ADMIN — SODIUM CHLORIDE, PRESERVATIVE FREE 10 ML: 5 INJECTION INTRAVENOUS at 08:39

## 2021-02-06 RX ADMIN — ALPRAZOLAM 0.5 MG: 0.5 TABLET ORAL at 22:33

## 2021-02-06 RX ADMIN — SUCRALFATE 1 G: 1 TABLET ORAL at 08:39

## 2021-02-06 RX ADMIN — SUCRALFATE 1 G: 1 TABLET ORAL at 20:17

## 2021-02-06 RX ADMIN — PROMETHAZINE HYDROCHLORIDE 12.5 MG: 12.5 TABLET ORAL at 05:41

## 2021-02-06 RX ADMIN — METOCLOPRAMIDE 5 MG: 5 TABLET ORAL at 17:40

## 2021-02-06 RX ADMIN — BARIUM SULFATE 700 MG: 700 TABLET ORAL at 11:30

## 2021-02-06 RX ADMIN — INSULIN LISPRO 5 UNITS: 100 INJECTION, SOLUTION INTRAVENOUS; SUBCUTANEOUS at 08:39

## 2021-02-06 RX ADMIN — SUCRALFATE 1 G: 1 TABLET ORAL at 17:40

## 2021-02-06 RX ADMIN — INSULIN LISPRO 5 UNITS: 100 INJECTION, SOLUTION INTRAVENOUS; SUBCUTANEOUS at 12:39

## 2021-02-06 RX ADMIN — INSULIN GLARGINE 80 UNITS: 100 INJECTION, SOLUTION SUBCUTANEOUS at 21:30

## 2021-02-06 RX ADMIN — INSULIN LISPRO 12 UNITS: 100 INJECTION, SOLUTION INTRAVENOUS; SUBCUTANEOUS at 21:30

## 2021-02-06 RX ADMIN — DULOXETINE HYDROCHLORIDE 30 MG: 30 CAPSULE, DELAYED RELEASE ORAL at 08:39

## 2021-02-06 RX ADMIN — HYDROXYZINE HYDROCHLORIDE 50 MG: 50 TABLET ORAL at 20:17

## 2021-02-06 RX ADMIN — METOCLOPRAMIDE 5 MG: 5 TABLET ORAL at 12:39

## 2021-02-06 RX ADMIN — BARIUM SULFATE 183 ML: 960 POWDER, FOR SUSPENSION ORAL at 11:35

## 2021-02-06 RX ADMIN — METOCLOPRAMIDE 5 MG: 5 TABLET ORAL at 20:17

## 2021-02-06 RX ADMIN — LEVETIRACETAM 500 MG: 500 TABLET, FILM COATED ORAL at 20:17

## 2021-02-06 RX ADMIN — MIRTAZAPINE 15 MG: 15 TABLET, FILM COATED ORAL at 20:17

## 2021-02-06 RX ADMIN — OXYCODONE 5 MG: 5 TABLET ORAL at 17:40

## 2021-02-06 RX ADMIN — OXYCODONE 5 MG: 5 TABLET ORAL at 05:39

## 2021-02-06 NOTE — NURSING NOTE
"1130 Pt states after standing started seeing \"floaters' in both eyes and heard roaring in left ear. Reminded not to get up out of bed or chair without nursing staff help. Pt agreed.  "

## 2021-02-06 NOTE — PLAN OF CARE
Goal Outcome Evaluation:  Plan of Care Reviewed With: patient     Outcome Summary: Medicated for abd pain. No reqs for prn nausea med. Various c/o's reported to Dr. Cunningham. Tolerating diet. Telemetry SR.

## 2021-02-06 NOTE — PROGRESS NOTES
Name: Silvia Zabala ADMIT: 2/3/2021   : 1960  PCP: Kulwant Martínez APRN    MRN: [id number] LOS: 1 days   AGE/SEX: 60 y.o. female  ROOM: [facility]     Subjective   Subjective          Patient is lying in the bed and appears well today and symptoms are improving.  Nausea, vomiting, abdominal discomfort have been improving.    Objective   Objective   Vital Signs  Temp:  [98.4 °F (36.9 °C)-98.9 °F (37.2 °C)] 98.7 °F (37.1 °C)  Heart Rate:  [] 94  Resp:  [18] 18  BP: (136-165)/(75-87) 136/75  SpO2:  [93 %-96 %] 96 %  on   ;   Device (Oxygen Therapy): room air  Body mass index is 27.42 kg/m².  Physical Exam   HEENT:  Atraumatic, normocephalic.  PERRLA.  Extraocular movements intact.  Conjunctivae pink.  Sclerae, no icterus.  Mucous membranes dry.  NECK:  Supple.  No JVD.  HEART:  Regular rate and rhythm.  Normal S1, S2.   LUNGS:  Fairly clear to auscultation anteriorly.  No wheezes.  No crackles.  ABDOMEN:   Soft, nontender.  Bowel sounds present.  No rebound.  No  guarding.  EXTREMITIES:  No cyanosis, clubbing, or edema.  Palpable pedal pulses.  NEURO:  Grossly nonfocal.  No facial asymmetry.  Good strength in all 4  extremities.  SKIN:  Warm and dry.  No evidence of rashes.  LYMPH NODES:  No palpable cervical or supraclavicular lymphadenopathy.      Results Review     I reviewed the patient's new clinical results.  Results from last 7 days   Lab Units 21  0457 21  0532 21  0557 21  1139   WBC 10*3/mm3 3.42 4.20 4.10 6.13   HEMOGLOBIN g/dL 10.9* 12.6 11.1* 13.2   PLATELETS 10*3/mm3 79* 91* 79* 87*     Results from last 7 days   Lab Units 21  0532 21  0557 21  1139   SODIUM mmol/L 136 136 131*   POTASSIUM mmol/L 3.7 3.9 3.7   CHLORIDE mmol/L 104 102 96*   CO2 mmol/L 24.4 24.2 22.7   BUN mg/dL 15 16 21*   CREATININE mg/dL 0.85 0.64 0.85   GLUCOSE mg/dL 199* 208* 298*   Estimated Creatinine Clearance: 78.2 mL/min (by C-G formula based on SCr of 0.85 mg/dL).  Results  from last 7 days   Lab Units 02/05/21  0532 02/04/21  0557 02/03/21  1139   ALBUMIN g/dL 3.70 3.40* 4.20   BILIRUBIN mg/dL 1.7* 1.8* 2.2*   ALK PHOS U/L 158* 141* 186*   AST (SGOT) U/L 44* 38* 38*   ALT (SGPT) U/L 43* 35* 40*     Results from last 7 days   Lab Units 02/05/21  0532 02/04/21  0557 02/03/21  1139   CALCIUM mg/dL 9.2 9.4 9.7   ALBUMIN g/dL 3.70 3.40* 4.20   MAGNESIUM mg/dL  --   --  2.0   PHOSPHORUS mg/dL  --   --  2.7       COVID19   Date Value Ref Range Status   02/03/2021 Not Detected Not Detected - Ref. Range Final     SARS-CoV-2 PCR   Date Value Ref Range Status   01/15/2021 Not Detected Not Detected Final     Comment:     Nucleic acid specific to SARS-CoV-2 (COVID-19) virus was not detected in  this sample by the TaqPath (TM) COVID-19 Combo Kit.          SARS-CoV-2 (COVID-19) nucleic acid testing performed using [employer] Aptima (R) SARS-CoV-2 Assay or [employer] TaqPath (TM)  COVID-19 Combo Kit as indicated above under Emergency Use Authorization (EUA) from the FDA. Aptima (R) and TaqPath (TM) test performance  characteristics verified by [email or web address] in accordance with the FDAs Guidance Document (Policy for Diagnostic Tests for Coronavirus Disease-2019  during the Public Health Emergency) issued on March 16, 2020. The laboratory is regulated under CLIA as qualified to perform high-complexity testing  Unless otherwise noted, all testing was performed at [email or web address], CLIA No. [id number], [place] State Licensee No. [id number]     Hemoglobin A1C   Date/Time Value Ref Range Status   02/04/2021 0557 8.97 (H) 4.80 - 5.60 % Final     Glucose   Date/Time Value Ref Range Status   02/06/2021 1617 210 (H) 70 - 130 mg/dL Final   02/06/2021 1149 249 (H) 70 - 130 mg/dL Final   02/06/2021 0545 240 (H) 70 - 130 mg/dL Final   02/05/2021 2014 485 (C) 70 - 130 mg/dL Final   02/05/2021 1629 454 (C) 70 - 130 mg/dL Final   02/05/2021 1059 331 (H) 70 - 130 mg/dL Final   02/05/2021 0621 217 (H) 70 - 130 mg/dL  Final       FL Esophagram Complete Single Contrast  BARIUM ESOPHAGRAM     HISTORY: Dysphagia at level of thoracic inlet.     The patient was evaluated in the upright position. An initial chest  x-ray was obtained. The lungs are well-expanded and clear and the heart  and hilar structures appear normal.     The patient swallowed barium without difficulty. There is normal  distensibility of the hypopharynx and cervical esophagus. There is a  minimal thin anterior esophageal without and the cervical region at the  level of C5 as seen in the lateral view. This shows no change from  12/16/2018 and does not appear to cause significant esophageal  narrowing. When the patient swallowed a barium tablet, it did large  briefly in this region before passing through the length of the  esophagus and eventually passing into the stomach. The remainder of the  esophagus is normal in appearance.     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 Although no significant stricture is identified, there was a very  brief delay in passage of a barium tablet at this location. This is  similar to the study of 12/16/2018. The remainder the esophagus is  unremarkable.     73 images were obtained and the fluoroscopy time measures 1 minute and  15 seconds.     This report was finalized on 2/6/2021 12:01 PM by Dr. Garett Hess M.D.       Scheduled Medications  DULoxetine, 30 mg, Oral, Daily  hydrOXYzine, 50 mg, Oral, Nightly  insulin glargine, 80 Units, Subcutaneous, Nightly  insulin lispro, 0-14 Units, Subcutaneous, TID AC  levETIRAcetam, 500 mg, Oral, BID  metoclopramide, 5 mg, Oral, 4x Daily AC & at Bedtime  mirtazapine, 15 mg, Oral, Nightly  pantoprazole, 40 mg, Intravenous, Q AM  pregabalin, 75 mg, Oral, Q12H  sodium chloride, 10 mL, Intravenous, Q12H  spironolactone, 100 mg, Oral, Daily  sucralfate, 1 g, Oral, 4x Daily AC & at Bedtime    Infusions   Diet  Diet Regular; Consistent Carbohydrate        Assessment/Plan     Active Hospital Problems    Diagnosis  POA   • **Acute gastritis [K29.00]  Yes   • Transaminitis [R74.01]  Yes   • Hyperbilirubinemia [E80.6]  Yes   • UTI (urinary tract infection), bacterial [N39.0, A49.9]  Yes   • UTI (urinary tract infection) [N39.0]  Yes   • BRICE (obstructive sleep apnea) [G47.33]  Yes   • Diabetic peripheral neuropathy (CMS/HCC) [E11.42]  Yes   • Gastroparesis [K31.84]  Yes   • Portal hypertension (CMS/HCC) [K76.6]  Yes   • Hyperlipidemia [E78.5]  Yes   • Nausea & vomiting [R11.2]  Yes   • Hypersplenism [D73.1]  Yes   • Type 2 diabetes mellitus, uncontrolled, with neuropathy (CMS/HCC) [E11.40, E11.65]  Yes   • Anxiety and depression [F41.9, F32.9]  Yes   • Cirrhosis of liver (CMS/HCC) [K74.60]  Yes      Resolved Hospital Problems   No resolved problems to display.       1. Intractable nausea, vomiting, diarrhea is most likely secondary to acute gastroenteritis versus gastroparesis.    Continue with symptomatic management and patient has been improving clinically.  Underwent endoscopy in September of 2020 and GI does not see any reason to perform another endoscopy at this point.  Patient is on Reglan which will also be continued. Esophagogram was done today and there was no significant stenosis was noted.  If she continues to do well hopefully discharge home tomorrow.   2. History of liver cirrhosis with DUKES and esophageal varices, currently patient's mental status at baseline and she appears to be compensated from will cirrhosis standpoint.  She is not any volume overload.  Continue with Aldactone.  Patient is also on Xifaxan which will be continued.  3. Diabetes mellitus, on Lantus and corrective dose insulin which will be continued.  4. History of seizures, on Keppra and will be continued.      Rivera Cunningham MD  [facility]  02/06/21  17:04 EST     I wore protective equipment throughout this patient encounter including a face mask, gloves and  protective eyewear.  Hand hygiene was performed before donning protective equipment and after removal when leaving the room.

## 2021-02-06 NOTE — PLAN OF CARE
Pt c/o HA, applied ice pack per pt request, vs stable, NAD, standby assist, NSR    Problem: Adult Inpatient Plan of Care  Goal: Plan of Care Review  Outcome: Ongoing, Progressing  Goal: Patient-Specific Goal (Individualized)  Outcome: Ongoing, Progressing  Goal: Absence of Hospital-Acquired Illness or Injury  Outcome: Ongoing, Progressing  Intervention: Identify and Manage Fall Risk  Recent Flowsheet Documentation  Taken 2/6/2021 1400 by Svitlana Murguia RN  Safety Promotion/Fall Prevention:   activity supervised   assistive device/personal items within reach   clutter free environment maintained   fall prevention program maintained   safety round/check completed   room organization consistent   nonskid shoes/slippers when out of bed  Taken 2/6/2021 1000 by Svitlana Murguia RN  Safety Promotion/Fall Prevention: activity supervised  Taken 2/6/2021 0839 by Svitlana Murguia RN  Safety Promotion/Fall Prevention:   activity supervised   assistive device/personal items within reach   clutter free environment maintained   fall prevention program maintained   nonskid shoes/slippers when out of bed   room organization consistent   safety round/check completed  Intervention: Prevent Skin Injury  Recent Flowsheet Documentation  Taken 2/6/2021 1400 by Svitlana Murguia RN  Body Position: position changed independently  Taken 2/6/2021 1200 by Svitlana Murguia RN  Body Position: sitting up in bed  Taken 2/6/2021 1000 by Svitlana Murguia RN  Body Position: supine, legs elevated  Taken 2/6/2021 0839 by Svitlana Murguia RN  Body Position: supine, legs elevated  Intervention: Prevent and Manage VTE (venous thromboembolism) Risk  Recent Flowsheet Documentation  Taken 2/6/2021 1400 by Svitlana Murguia RN  VTE Prevention/Management:   bilateral   dorsiflexion/plantar flexion performed  Taken 2/6/2021 0839 by Svitlana Murguia RN  VTE Prevention/Management:   bilateral   dorsiflexion/plantar flexion performed  Intervention: Prevent  Infection  Recent Flowsheet Documentation  Taken 2/6/2021 1400 by Svitlana Murguia RN  Infection Prevention:   rest/sleep promoted   personal protective equipment utilized   single patient room provided  Taken 2/6/2021 1200 by Svitlana Murguia RN  Infection Prevention:   rest/sleep promoted   single patient room provided  Taken 2/6/2021 1000 by Svitlana Murguia RN  Infection Prevention:   rest/sleep promoted   single patient room provided  Taken 2/6/2021 0839 by Svitlana Murguia RN  Infection Prevention:   rest/sleep promoted   single patient room provided  Goal: Optimal Comfort and Wellbeing  Outcome: Ongoing, Progressing  Intervention: Provide Person-Centered Care  Recent Flowsheet Documentation  Taken 2/6/2021 1400 by Svitlana Murguia RN  Trust Relationship/Rapport: care explained  Taken 2/6/2021 0839 by Svitlana Murguia RN  Trust Relationship/Rapport:   care explained   thoughts/feelings acknowledged   reassurance provided   questions encouraged   questions answered   emotional support provided  Goal: Readiness for Transition of Care  Outcome: Ongoing, Progressing     Problem: Pain Chronic (Persistent) (Comorbidity Management)  Goal: Acceptable Pain Control and Functional Ability  Outcome: Ongoing, Progressing  Intervention: Develop Pain Management Plan  Recent Flowsheet Documentation  Taken 2/6/2021 0839 by Svitlana Murguia RN  Pain Management Interventions: (pt requested ice pack) cold applied  Intervention: Manage Persistent Pain  Recent Flowsheet Documentation  Taken 2/6/2021 1400 by Svitlana Murguia RN  Medication Review/Management: medications reviewed  Taken 2/6/2021 1000 by Svitlana Murguia RN  Medication Review/Management: medications reviewed  Taken 2/6/2021 0839 by Svitlana Murguia RN  Medication Review/Management: medications reviewed  Intervention: Optimize Psychosocial Wellbeing  Recent Flowsheet Documentation  Taken 2/6/2021 1400 by Svitlana Murgiua RN  Diversional Activities:   television    smartphone  Family/Support System Care: self-care encouraged  Taken 2/6/2021 1241 by Svitlana Murguia RN  Family/Support System Care:   self-care encouraged   support provided  Taken 2/6/2021 0839 by Svitlana Murguia RN  Diversional Activities:   television   smartphone  Family/Support System Care:   self-care encouraged   presence promoted     Problem: Skin Injury Risk Increased  Goal: Skin Health and Integrity  Outcome: Ongoing, Progressing  Intervention: Optimize Skin Protection  Recent Flowsheet Documentation  Taken 2/6/2021 1400 by Svitlana Murguia RN  Head of Bed (HOB): HOB elevated  Taken 2/6/2021 1200 by Svitlana Murguia RN  Head of Bed (HOB): HOB elevated  Taken 2/6/2021 1000 by Svitlana Murguia RN  Head of Bed (HOB): HOB at 20 degrees  Taken 2/6/2021 0839 by Svitlana Murguia RN  Head of Bed (HOB): HOB elevated     Problem: Suicide Risk  Goal: Absence of Self-Harm  Outcome: Ongoing, Progressing  Intervention: Promote Psychosocial Wellbeing  Recent Flowsheet Documentation  Taken 2/6/2021 1400 by Svitlana Murguia RN  Family/Support System Care: self-care encouraged  Taken 2/6/2021 1241 by Svitlana Murguia RN  Family/Support System Care:   self-care encouraged   support provided  Taken 2/6/2021 0839 by Svitlana Murguia RN  Family/Support System Care:   self-care encouraged   presence promoted     Problem: Fall Injury Risk  Goal: Absence of Fall and Fall-Related Injury  Outcome: Ongoing, Progressing  Intervention: Identify and Manage Contributors to Fall Injury Risk  Recent Flowsheet Documentation  Taken 2/6/2021 1400 by Svitlana Murguia RN  Medication Review/Management: medications reviewed  Self-Care Promotion:   independence encouraged   BADL personal objects within reach   BADL personal routines maintained  Taken 2/6/2021 1000 by Svitlana Murguia, RN  Medication Review/Management: medications reviewed  Taken 2/6/2021 0839 by Svitlana Murguia RN  Medication Review/Management: medications reviewed  Self-Care  Promotion:   independence encouraged   BADL personal objects within reach   BADL personal routines maintained  Intervention: Promote Injury-Free Environment  Recent Flowsheet Documentation  Taken 2/6/2021 1400 by Svitlana Murguia, RN  Safety Promotion/Fall Prevention:   activity supervised   assistive device/personal items within reach   clutter free environment maintained   fall prevention program maintained   safety round/check completed   room organization consistent   nonskid shoes/slippers when out of bed  Taken 2/6/2021 1000 by Svitlana Murguia, RN  Safety Promotion/Fall Prevention: activity supervised  Taken 2/6/2021 0839 by Svitlana Murguia, RN  Safety Promotion/Fall Prevention:   activity supervised   assistive device/personal items within reach   clutter free environment maintained   fall prevention program maintained   nonskid shoes/slippers when out of bed   room organization consistent   safety round/check completed   Goal Outcome Evaluation:

## 2021-02-06 NOTE — PROGRESS NOTES
Gastroenterology   Inpatient Progress Note    Reason for Follow Up: Nausea and dysphagia    Subjective     Interval History:   Patient complains this morning of some continued difficulty especially with initiation of swallowing.  She has not had any further vomiting.    Current Facility-Administered Medications:   •  ALPRAZolam (XANAX) tablet 0.5 mg, 0.5 mg, Oral, BID PRN, Mina Gonzalez MD  •  dextrose (D50W) 25 g/ 50mL Intravenous Solution 25 g, 25 g, Intravenous, Q15 Min PRN, Karlene Fernandez APRN  •  dextrose (GLUTOSE) oral gel 15 g, 15 g, Oral, Q15 Min PRN, Karlene Fernandez, APRN  •  DULoxetine (CYMBALTA) DR capsule 30 mg, 30 mg, Oral, Daily, Sukhdeep Rodarte III, MD, 30 mg at 02/06/21 0839  •  furosemide (LASIX) tablet 80 mg, 80 mg, Oral, Daily PRN, Mina Gonzalez MD  •  glucagon (human recombinant) (GLUCAGEN DIAGNOSTIC) injection 1 mg, 1 mg, Subcutaneous, PRN, Karlene Fernandez, APRN  •  Glycerin-Hypromellose- (ARTIFICIAL TEARS) 0.2-0.2-1 % ophthalmic solution solution 1 drop, 1 drop, Left Eye, Q1H PRN, Rivera Cunningham MD, 1 drop at 02/04/21 1551  •  hydrOXYzine (ATARAX) tablet 50 mg, 50 mg, Oral, Nightly, Mina Gonzalez MD, 50 mg at 02/05/21 2044  •  insulin glargine (LANTUS, SEMGLEE) injection 80 Units, 80 Units, Subcutaneous, Nightly, Mina Gonzalez MD, 80 Units at 02/05/21 2044  •  insulin lispro (humaLOG, ADMELOG) injection 0-14 Units, 0-14 Units, Subcutaneous, TID AC, Karelne Fernandez APRN, 5 Units at 02/06/21 0839  •  levETIRAcetam (KEPPRA) tablet 500 mg, 500 mg, Oral, BID, Mina Gonzalez MD, 500 mg at 02/06/21 0839  •  metoclopramide (REGLAN) tablet 5 mg, 5 mg, Oral, 4x Daily AC & at Bedtime, Mina Gonzalez MD, 5 mg at 02/06/21 0839  •  mirtazapine (REMERON) tablet 15 mg, 15 mg, Oral, Nightly, Mina Gonzalez MD, 15 mg at 02/05/21 2044  •  oxyCODONE (ROXICODONE) immediate release tablet 5 mg, 5 mg, Oral, Q6H PRN, Rivera Cunningham MD, 5 mg at 02/06/21  0539  •  pantoprazole (PROTONIX) injection 40 mg, 40 mg, Intravenous, Q AM, Mina Gonzalez MD, 40 mg at 02/06/21 0838  •  pregabalin (LYRICA) capsule 75 mg, 75 mg, Oral, Q12H, iMna Gonzalez MD, 75 mg at 02/06/21 0839  •  promethazine (PHENERGAN) tablet 12.5 mg, 12.5 mg, Oral, Q6H PRN, Mina Gonzalez MD, 12.5 mg at 02/06/21 0541  •  sodium chloride 0.9 % flush 10 mL, 10 mL, Intravenous, PRN, Emergency, Triage Protocol, MD  •  sodium chloride 0.9 % flush 10 mL, 10 mL, Intravenous, Q12H, Mina Gonzalez MD, 10 mL at 02/06/21 0839  •  sodium chloride 0.9 % flush 10 mL, 10 mL, Intravenous, PRN, Mina Gonzalez MD  •  spironolactone (ALDACTONE) tablet 100 mg, 100 mg, Oral, Daily, Mina Gonzalez MD, 100 mg at 02/06/21 0839  •  sucralfate (CARAFATE) tablet 1 g, 1 g, Oral, 4x Daily AC & at Bedtime, Mina Gonzalez MD, 1 g at 02/06/21 0839  Review of Systems:    The following systems were reviewed and negative;  constitution    Objective     Vital Signs  Temp:  [98 °F (36.7 °C)-98.9 °F (37.2 °C)] 98.5 °F (36.9 °C)  Heart Rate:  [] 98  Resp:  [18] 18  BP: (138-165)/(67-87) 156/87  Body mass index is 27.42 kg/m².    Intake/Output Summary (Last 24 hours) at 2/6/2021 0931  Last data filed at 2/6/2021 0510  Gross per 24 hour   Intake 720 ml   Output --   Net 720 ml     No intake/output data recorded.     Physical Exam:   General: patient awake, alert and cooperative   Eyes: no scleral icterus   Skin: warm and dry, not jaundiced   Abdomen: soft, nontender, nondistended; normal bowel sounds, no masses palpated, no periumbical lymphadenopathy   Psychiatric: Appropriate affect and behavior     Results Review:     I reviewed the patient's new clinical results.    Results from last 7 days   Lab Units 02/06/21  0457 02/05/21  0532 02/04/21  0557   WBC 10*3/mm3 3.42 4.20 4.10   HEMOGLOBIN g/dL 10.9* 12.6 11.1*   HEMATOCRIT % 31.5* 35.7 30.9*   PLATELETS 10*3/mm3 79* 91* 79*     Results from last 7 days   Lab Units  02/05/21  0532 02/04/21  0557 02/03/21  1139   SODIUM mmol/L 136 136 131*   POTASSIUM mmol/L 3.7 3.9 3.7   CHLORIDE mmol/L 104 102 96*   CO2 mmol/L 24.4 24.2 22.7   BUN mg/dL 15 16 21*   CREATININE mg/dL 0.85 0.64 0.85   CALCIUM mg/dL 9.2 9.4 9.7   BILIRUBIN mg/dL 1.7* 1.8* 2.2*   ALK PHOS U/L 158* 141* 186*   ALT (SGPT) U/L 43* 35* 40*   AST (SGOT) U/L 44* 38* 38*   GLUCOSE mg/dL 199* 208* 298*     Results from last 7 days   Lab Units 02/03/21  1139   INR  1.15*     Lab Results   Lab Value Date/Time    LIPASE 14 02/03/2021 1139    LIPASE 8 (L) 05/09/2019 2043    LIPASE 9 (L) 01/17/2019 1259    LIPASE 8 (L) 12/13/2018 1900    LIPASE 12 (L) 10/02/2018 2332    LIPASE 9 (L) 05/18/2018 0425    LIPASE 22 12/16/2017 1650    LIPASE 10 (L) 10/30/2017 1732    LIPASE 9 (L) 02/21/2017 1202    LIPASE 16 11/10/2014 0724       Radiology:  XR Chest AP   Final Result      CT Abdomen Pelvis With Contrast   Final Result   1. Hepatic cirrhosis with splenomegaly. No ascites. Patent TIPS stent in   place.   2. Circumferential wall thickening of the stomach that may suggest   gastritis.       These findings were discussed with Dr. Astudillo by telephone at the time of   dictation.       Radiation dose reduction techniques were utilized, including automated   exposure control and exposure modulation based on body size.       This report was finalized on 2/4/2021 10:12 AM by Dr. Rose Lin M.D.          XR Chest 1 View   Final Result   No acute process.       This report was finalized on 2/3/2021 12:27 PM by Dr. Hema Sampson M.D.              Assessment/Plan     Patient Active Problem List   Diagnosis   • Cirrhosis of liver (CMS/HCC)   • Rheumatoid arthritis (CMS/HCC)   • Anxiety and depression   • Type 2 diabetes mellitus, uncontrolled, with neuropathy (CMS/HCC)   • Fibrositis   • Change in blood platelet count   • Pancytopenia (CMS/HCC)   • Hypersplenism   • Nausea & vomiting   • DUKES (nonalcoholic steatohepatitis)   •  Hyperlipidemia   • Abdominal pain   • Hematemesis   • Ascites   • Portal hypertension (CMS/HCC)   • Systemic lupus (CMS/HCC)   • Secondary esophageal varices without bleeding (CMS/HCC)   • Esophageal varices with bleeding (CMS/HCC)   • Gastroparesis   • Cirrhosis of liver with ascites (CMS/HCC)   • Diabetic ketoacidosis without coma associated with type 2 diabetes mellitus (CMS/HCC)   • Diabetic peripheral neuropathy (CMS/HCC)   • History of seizure disorder   • Hepatic encephalopathy (CMS/HCC)   • Thrombocytopenia (CMS/HCC)   • Fever   • Acute ITP (CMS/HCC)   • Degeneration of lumbosacral intervertebral disc   • Seizure (CMS/HCC)   • Spondylosis without myelopathy   • Intractable vomiting with nausea   • UGI bleed   • Migraine without aura   • Urinary retention   • Uncontrolled diabetes mellitus with hyperglycemia (CMS/HCC)   • BRICE (obstructive sleep apnea)   • Gastroparesis   • Hyperammonemia (CMS/HCC)   • Hyponatremia   • Anemia   • Vitamin D insufficiency   • Hyperglycemia   • Dehydration   • Iron deficiency anemia   • Adverse effect of iron or its compound, subsequent encounter   • Hemorrhage of anus and rectum   • Vulvar lesion   • Acute upper GI bleed   • Acute blood loss anemia   • Acute hyperkalemia   • Labial cyst   • Transaminitis   • Hyperbilirubinemia   • Acute gastritis   • UTI (urinary tract infection), bacterial   • UTI (urinary tract infection)     These problems are new to me  Assessment:  1. Dysphagia.  This is a chronic and ongoing problem.  She will need an EGD at some point but will go ahead and proceed with esophagram this weekend to better assess our planning for the EGD given her history of varices.  2. Wong cirrhosis.  Chronic and stable  3. Hepatic encephalopathy.  Stable.  Her mental status seems to be appropriate.  Monitor ammonia level.  4. Nutrition.  Diet as tolerated but again somewhat limited by her difficulty swallowing.  5. Questionable gastritis.  Continue PPI and  Carafate.      Plan:  · Schedule esophagram today.  · Monitor ammonia level  · Continue PPI and Carafate.  · Plan outpatient EGD pending results of esophagram.  I discussed the patients findings and my recommendations with patient and nursing staff.    MD Bob Santana M.D.  [facility] [address]  Office: [phone]

## 2021-02-06 NOTE — CONSULTS
The patient is a bed resting comfortably today.  She does not awaken for interview.  If abdominal pain or nausea remain problematic, we may need to consider a change in medication from an SNRI to a better tolerated SSRI medication

## 2021-02-06 NOTE — NURSING NOTE
1400  Pt asked for post left vulvar excision site to be checked. States had diarrhea this week and afraid site might become infected. Site without redness, swelling or drainage and appears intact.

## 2021-02-07 ENCOUNTER — READMISSION MANAGEMENT (OUTPATIENT)
Dept: CALL CENTER | Facility: HOSPITAL | Age: 61
End: 2021-02-07

## 2021-02-07 VITALS
WEIGHT: 175.04 LBS | SYSTOLIC BLOOD PRESSURE: 138 MMHG | TEMPERATURE: 98.9 F | BODY MASS INDEX: 27.47 KG/M2 | HEART RATE: 100 BPM | DIASTOLIC BLOOD PRESSURE: 77 MMHG | HEIGHT: 67 IN | OXYGEN SATURATION: 95 % | RESPIRATION RATE: 18 BRPM

## 2021-02-07 LAB
ANISOCYTOSIS BLD QL: ABNORMAL
DEPRECATED RDW RBC AUTO: 50.3 FL (ref 37–54)
ERYTHROCYTE [DISTWIDTH] IN BLOOD BY AUTOMATED COUNT: 16.2 % (ref 12.3–15.4)
GLUCOSE BLDC GLUCOMTR-MCNC: 118 MG/DL (ref 70–130)
GLUCOSE BLDC GLUCOMTR-MCNC: 241 MG/DL (ref 70–130)
GLUCOSE BLDC GLUCOMTR-MCNC: 285 MG/DL (ref 70–130)
HCT VFR BLD AUTO: 35 % (ref 34–46.6)
HGB BLD-MCNC: 12.6 G/DL (ref 12–15.9)
LYMPHOCYTES # BLD MANUAL: 0.82 10*3/MM3 (ref 0.7–3.1)
LYMPHOCYTES NFR BLD MANUAL: 18.2 % (ref 19.6–45.3)
LYMPHOCYTES NFR BLD MANUAL: 2 % (ref 5–12)
MCH RBC QN AUTO: 31.1 PG (ref 26.6–33)
MCHC RBC AUTO-ENTMCNC: 36 G/DL (ref 31.5–35.7)
MCV RBC AUTO: 86.4 FL (ref 79–97)
MICROCYTES BLD QL: ABNORMAL
MONOCYTES # BLD AUTO: 0.09 10*3/MM3 (ref 0.1–0.9)
NEUTROPHILS # BLD AUTO: 3.6 10*3/MM3 (ref 1.7–7)
NEUTROPHILS NFR BLD MANUAL: 79.8 % (ref 42.7–76)
PLAT MORPH BLD: NORMAL
PLATELET # BLD AUTO: 117 10*3/MM3 (ref 140–450)
PMV BLD AUTO: 10.3 FL (ref 6–12)
RBC # BLD AUTO: 4.05 10*6/MM3 (ref 3.77–5.28)
WBC # BLD AUTO: 4.51 10*3/MM3 (ref 3.4–10.8)
WBC MORPH BLD: NORMAL

## 2021-02-07 PROCEDURE — 85025 COMPLETE CBC W/AUTO DIFF WBC: CPT | Performed by: INTERNAL MEDICINE

## 2021-02-07 PROCEDURE — 99214 OFFICE O/P EST MOD 30 MIN: CPT | Performed by: INTERNAL MEDICINE

## 2021-02-07 PROCEDURE — 63710000001 INSULIN LISPRO (HUMAN) PER 5 UNITS: Performed by: NURSE PRACTITIONER

## 2021-02-07 PROCEDURE — G0378 HOSPITAL OBSERVATION PER HR: HCPCS

## 2021-02-07 PROCEDURE — 82962 GLUCOSE BLOOD TEST: CPT

## 2021-02-07 PROCEDURE — 85007 BL SMEAR W/DIFF WBC COUNT: CPT | Performed by: INTERNAL MEDICINE

## 2021-02-07 PROCEDURE — 96376 TX/PRO/DX INJ SAME DRUG ADON: CPT

## 2021-02-07 RX ORDER — METAXALONE 800 MG/1
800 TABLET ORAL 3 TIMES DAILY
Qty: 42 TABLET | Refills: 0 | Status: SHIPPED | OUTPATIENT
Start: 2021-02-07 | End: 2021-02-21

## 2021-02-07 RX ORDER — DULOXETIN HYDROCHLORIDE 30 MG/1
30 CAPSULE, DELAYED RELEASE ORAL DAILY
Qty: 30 CAPSULE | Refills: 0 | Status: ON HOLD | OUTPATIENT
Start: 2021-02-08 | End: 2021-04-01

## 2021-02-07 RX ADMIN — SODIUM CHLORIDE, PRESERVATIVE FREE 10 ML: 5 INJECTION INTRAVENOUS at 09:02

## 2021-02-07 RX ADMIN — METOCLOPRAMIDE 5 MG: 5 TABLET ORAL at 16:39

## 2021-02-07 RX ADMIN — SUCRALFATE 1 G: 1 TABLET ORAL at 11:56

## 2021-02-07 RX ADMIN — METOCLOPRAMIDE 5 MG: 5 TABLET ORAL at 06:12

## 2021-02-07 RX ADMIN — PANTOPRAZOLE SODIUM 40 MG: 40 INJECTION, POWDER, FOR SOLUTION INTRAVENOUS at 06:12

## 2021-02-07 RX ADMIN — SUCRALFATE 1 G: 1 TABLET ORAL at 06:12

## 2021-02-07 RX ADMIN — DULOXETINE HYDROCHLORIDE 30 MG: 30 CAPSULE, DELAYED RELEASE ORAL at 09:02

## 2021-02-07 RX ADMIN — METOCLOPRAMIDE 5 MG: 5 TABLET ORAL at 11:56

## 2021-02-07 RX ADMIN — INSULIN LISPRO 5 UNITS: 100 INJECTION, SOLUTION INTRAVENOUS; SUBCUTANEOUS at 11:56

## 2021-02-07 RX ADMIN — SUCRALFATE 1 G: 1 TABLET ORAL at 16:39

## 2021-02-07 RX ADMIN — LEVETIRACETAM 500 MG: 500 TABLET, FILM COATED ORAL at 09:02

## 2021-02-07 RX ADMIN — SPIRONOLACTONE 100 MG: 100 TABLET, FILM COATED ORAL at 09:02

## 2021-02-07 RX ADMIN — INSULIN LISPRO 8 UNITS: 100 INJECTION, SOLUTION INTRAVENOUS; SUBCUTANEOUS at 16:39

## 2021-02-07 RX ADMIN — OXYCODONE 5 MG: 5 TABLET ORAL at 04:28

## 2021-02-07 RX ADMIN — OXYCODONE 5 MG: 5 TABLET ORAL at 10:35

## 2021-02-07 RX ADMIN — PREGABALIN 75 MG: 75 CAPSULE ORAL at 09:02

## 2021-02-07 NOTE — PROGRESS NOTES
The patient is in bright spirits and happy that she may be discharged later today.  She is expressing interest in psychiatric follow-up, and I will asked the [facility] center to see her regarding this.

## 2021-02-07 NOTE — NURSING NOTE
Spoke with [facility] about following up with patient and interest in a psychiatric follow-up per Dr. Rodarte's note. They informed me that someone already had seen the patient  on 2-4-21 and had given patient referrals and phone numbers so that she can follow-up on this. I made sure she had this information and told Access.

## 2021-02-07 NOTE — PLAN OF CARE
Problem: Adult Inpatient Plan of Care  Goal: Plan of Care Review  Outcome: Ongoing, Progressing  Flowsheets (Taken 2/7/2021 1419)  Progress: improving  Plan of Care Reviewed With: patient  Outcome Summary: Patient has been pleasant and cooperative during shift. No complaints of nausea or SOA. Patient on room air. Patient has been treated once for pain. OK to D/C per GI. Diet as tolerated. Patient is assist x1 to bedside commode. Will continue to monitor and assist patient as needed.  Goal: Patient-Specific Goal (Individualized)  Outcome: Ongoing, Progressing  Goal: Absence of Hospital-Acquired Illness or Injury  Outcome: Ongoing, Progressing  Intervention: Identify and Manage Fall Risk  Recent Flowsheet Documentation  Taken 2/7/2021 1400 by Logan Francis RN  Safety Promotion/Fall Prevention:   assistive device/personal items within reach   fall prevention program maintained   nonskid shoes/slippers when out of bed   safety round/check completed  Taken 2/7/2021 1156 by Logan Francis RN  Safety Promotion/Fall Prevention:   assistive device/personal items within reach   fall prevention program maintained   nonskid shoes/slippers when out of bed   safety round/check completed  Taken 2/7/2021 0954 by Logan Francis RN  Safety Promotion/Fall Prevention:   assistive device/personal items within reach   fall prevention program maintained   nonskid shoes/slippers when out of bed   safety round/check completed  Taken 2/7/2021 0904 by Logan Francis RN  Safety Promotion/Fall Prevention:   assistive device/personal items within reach   fall prevention program maintained   nonskid shoes/slippers when out of bed   safety round/check completed  Intervention: Prevent Skin Injury  Recent Flowsheet Documentation  Taken 2/7/2021 1400 by Logan Francis RN  Body Position: supine  Taken 2/7/2021 1156 by Logan Francis RN  Body Position: supine  Taken 2/7/2021 0954 by Logan Francis RN  Body Position:  supine  Taken 2/7/2021 0904 by Logan Francis RN  Body Position: supine  Goal: Optimal Comfort and Wellbeing  Outcome: Ongoing, Progressing  Goal: Readiness for Transition of Care  Outcome: Ongoing, Progressing     Problem: Pain Chronic (Persistent) (Comorbidity Management)  Goal: Acceptable Pain Control and Functional Ability  Outcome: Ongoing, Progressing     Problem: Skin Injury Risk Increased  Goal: Skin Health and Integrity  Outcome: Ongoing, Progressing  Intervention: Optimize Skin Protection  Recent Flowsheet Documentation  Taken 2/7/2021 1400 by Logan Francis RN  Pressure Reduction Techniques:   frequent weight shift encouraged   weight shift assistance provided  Head of Bed (HOB): HOB at 45 degrees  Pressure Reduction Devices: pressure-redistributing mattress utilized  Skin Protection:   tubing/devices free from skin contact   incontinence pads utilized  Taken 2/7/2021 1156 by Logan Francis RN  Head of Bed (HOB): HOB at 45 degrees  Taken 2/7/2021 0954 by Logan Francis RN  Head of Bed (HOB): HOB at 45 degrees  Taken 2/7/2021 0904 by Logan Francis RN  Pressure Reduction Techniques: frequent weight shift encouraged  Head of Bed (HOB): HOB at 45 degrees  Pressure Reduction Devices: pressure-redistributing mattress utilized  Skin Protection: tubing/devices free from skin contact     Problem: Suicide Risk  Goal: Absence of Self-Harm  Outcome: Ongoing, Progressing     Problem: Fall Injury Risk  Goal: Absence of Fall and Fall-Related Injury  Outcome: Ongoing, Progressing  Intervention: Promote Injury-Free Environment  Recent Flowsheet Documentation  Taken 2/7/2021 1400 by Logan Francis RN  Safety Promotion/Fall Prevention:   assistive device/personal items within reach   fall prevention program maintained   nonskid shoes/slippers when out of bed   safety round/check completed  Taken 2/7/2021 1156 by Logan Francis RN  Safety Promotion/Fall Prevention:   assistive device/personal items  within reach   fall prevention program maintained   nonskid shoes/slippers when out of bed   safety round/check completed  Taken 2/7/2021 0954 by Logan Francis, RN  Safety Promotion/Fall Prevention:   assistive device/personal items within reach   fall prevention program maintained   nonskid shoes/slippers when out of bed   safety round/check completed  Taken 2/7/2021 0904 by Logan Francis, RN  Safety Promotion/Fall Prevention:   assistive device/personal items within reach   fall prevention program maintained   nonskid shoes/slippers when out of bed   safety round/check completed   Goal Outcome Evaluation:  Plan of Care Reviewed With: patient  Progress: improving  Outcome Summary: Patient has been pleasant and cooperative during shift. No complaints of nausea or SOA. Patient on room air. Patient has been treated once for pain. OK to D/C per GI. Diet as tolerated. Patient is assist x1 to bedside commode. Will continue to monitor and assist patient as needed.

## 2021-02-07 NOTE — DISCHARGE SUMMARY
Patient Name: Silvia Zabala  : 1960  MRN: [id number]    Date of Admission: 2/3/2021  Date of Discharge:  2021  Primary Care Physician: Kulwant Martínez APRN      Chief Complaint:   Vomiting, Headache, and Hyperglycemia      Discharge Diagnoses     Active Hospital Problems    Diagnosis  POA   • **Acute gastritis [K29.00]  Yes   • Transaminitis [R74.01]  Yes   • Hyperbilirubinemia [E80.6]  Yes   • UTI (urinary tract infection), bacterial [N39.0, A49.9]  Yes   • UTI (urinary tract infection) [N39.0]  Yes   • BRICE (obstructive sleep apnea) [G47.33]  Yes   • Diabetic peripheral neuropathy (CMS/HCC) [E11.42]  Yes   • Gastroparesis [K31.84]  Yes   • Portal hypertension (CMS/HCC) [K76.6]  Yes   • Hyperlipidemia [E78.5]  Yes   • Nausea & vomiting [R11.2]  Yes   • Hypersplenism [D73.1]  Yes   • Type 2 diabetes mellitus, uncontrolled, with neuropathy (CMS/HCC) [E11.40, E11.65]  Yes   • Anxiety and depression [F41.9, F32.9]  Yes   • Cirrhosis of liver (CMS/HCC) [K74.60]  Yes      Resolved Hospital Problems   No resolved problems to display.        Hospital Course   60-year-old female, with history of anemia, anxiety, liver cirrhosis, diabetes mellitus, fibromyalgia, esophageal varices, gastroparesis, is seen in the hospital today with main complaints of nausea and vomiting.  Patient upon further evaluation in the emergency room, states that she has been unable to keep anything down.  Patient does have significant hyperglycemia in the emergency room.  She does exhibit hyperbilirubinemia and transaminitis.  Patient will be admitted to hospitalist service for further workup of symptoms.  Upon review of urinalysis, she has findings consistent with UTI.  Patient has had recent tips procedure done, she has been doing fairly okay. Over last 2-3 days she has been feeling really sick.  She denies fever, chills.      1. Intractable nausea, vomiting, diarrhea is most likely secondary to acute gastroenteritis versus  gastroparesis.    Continue with symptomatic management and patient has been improving clinically.  Underwent endoscopy in September of 2020 and GI does not see any reason to perform another endoscopy at this point.  Patient is on Reglan which will also be continued. Esophagogram was done today and there was no significant stenosis was noted.  Patient is able to tolerate p.o. diet therefore will be discharged home and will follow-up with GI as an outpatient basis.  2. History of liver cirrhosis with DUKES and esophageal varices, currently patient's mental status at baseline and she appears to be compensated from will cirrhosis standpoint.  She is not any volume overload.  Continue with Aldactone.  Patient is also on Xifaxan which will be continued.  3. Diabetes mellitus, on Lantus and corrective dose insulin which will be continued.  4. History of seizures, on Keppra and will be continued.  5. Depression, psychiatry did evaluate and citalopram has been initiated.       Please note patient was seen examined today on day of discharge.           Day of Discharge       Physical Exam:  Temp:  [97.8 °F (36.6 °C)-98.9 °F (37.2 °C)] 98.9 °F (37.2 °C)  Heart Rate:  [] 100  Resp:  [18] 18  BP: (131-150)/(71-80) 138/77  Body mass index is 27.42 kg/m².  Physical Exam    HEENT:  Atraumatic, normocephalic.  PERRLA.  Extraocular movements intact.  Conjunctivae pink.  Sclerae, no icterus.  Mucous membranes dry.   NECK:  Supple.  No JVD.  HEART:  Regular rate and rhythm.  Normal S1, S2.   LUNGS:  Fairly clear to auscultation anteriorly.  No wheezes.  No crackles.  ABDOMEN:   Soft, nontender.  Bowel sounds present.  No rebound.  No  guarding.  EXTREMITIES:  No cyanosis, clubbing, or edema.  Palpable pedal pulses.  NEURO:  Grossly nonfocal.  No facial asymmetry.  Good strength in all 4  extremities.  SKIN:  Warm and dry.  No evidence of rashes.      Consultants     Consult Orders (all) (From admission, onward)     Start      Ordered    02/04/21 1130  Hematology & Oncology Inpatient Consult  Once     Specialty:  Hematology and Oncology  Provider:  Gerson Olson II, MD    02/04/21 1130    02/04/21 1128  Inpatient Psychiatrist Consult  Once     Specialty:  Psychiatry  Provider:  Sukhdeep Rodarte III, MD    02/04/21 1130    02/03/21 2031  Inpatient Access Center Consult  Once     Provider:  (Not yet assigned)    02/03/21 2031 02/03/21 1546  Inpatient Gastroenterology Consult  Once     Specialty:  Gastroenterology  Provider:  Logan Gomez MD    02/03/21 1545    02/03/21 1457  [facility] (on-call MD unless specified) Details  Once     Specialty:  Hospitalist  Provider:  (Not yet assigned)    02/03/21 1456              Procedures     Imaging Results (All)     Procedure Component Value Units Date/Time    FL Esophagram Complete Single Contrast [[id number]] Collected: 02/06/21 1201     Updated: 02/06/21 1205    Narrative:      BARIUM ESOPHAGRAM     HISTORY: Dysphagia at level of thoracic inlet.     The patient was evaluated in the upright position. An initial chest  x-ray was obtained. The lungs are well-expanded and clear and the heart  and hilar structures appear normal.     The patient swallowed barium without difficulty. There is normal  distensibility of the hypopharynx and cervical esophagus. There is a  minimal thin anterior esophageal without and the cervical region at the  level of C5 as seen in the lateral view. This shows no change from  12/16/2018 and does not appear to cause significant esophageal  narrowing. When the patient swallowed a barium tablet, it did large  briefly in this region before passing through the length of the  esophagus and eventually passing into the stomach. The remainder of the  esophagus is normal in appearance.     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 Although no significant stricture is identified, there was a very  brief delay in passage of a barium  tablet at this location. This is  similar to the study of 12/16/2018. The remainder the esophagus is  unremarkable.     73 images were obtained and the fluoroscopy time measures 1 minute and  15 seconds.     This report was finalized on 2/6/2021 12:01 PM by Dr. Garett Hess M.D.       CT Abdomen Pelvis With Contrast [[id number]] Collected: 02/03/21 1406     Updated: 02/04/21 1015    Narrative:      CT ABDOMEN AND PELVIS WITH CONTRAST     HISTORY: Generalized abdominal pain and vomiting.     TECHNIQUE: Axial CT images of the abdomen and pelvis were obtained  following administration of intravenous contrast. The patient was not  given oral contrast Coronal and sagittal reformats were obtained.     COMPARISON: CT abdomen and pelvis from 10/03/2018     FINDINGS: The liver demonstrates a nodular outline consistent with  cirrhosis. A TIPS stent is in place. The stent appears to be patent.  Evaluation of liver parenchyma is limited due to single phase imaging.  The gallbladder is surgically absent. The spleen is enlarged measuring  14.1 cm in craniocaudal and 18.4 cm in AP diameter. Small low-density  lesions within it are favored to represent benign cysts. There is  dilatation of the splenic vein, as well as the splenoportal confluence.  The pancreas appears to be atrophic. Bilateral adrenal glands and  kidneys are unremarkable. There is no ascites. No evidence of bowel  obstruction. Diffuse wall thickening of the stomach may suggest  gastritis. There is mixed fluid and stool content throughout the colon.  The small bowel loops are unremarkable. Moderate calcified plaque is  seen within the abdominal aorta.       Impression:      1. Hepatic cirrhosis with splenomegaly. No ascites. Patent TIPS stent in  place.  2. Circumferential wall thickening of the stomach that may suggest  gastritis.     These findings were discussed with Dr. Astudillo by telephone at the time of  dictation.     Radiation dose reduction techniques were  utilized, including automated  exposure control and exposure modulation based on body size.     This report was finalized on 2/4/2021 10:12 AM by Dr. Rose Lin M.D.       XR Chest AP [[id number]] Collected: 02/03/21 1438     Updated: 02/03/21 1442    Narrative:      XR CHEST AP-     HISTORY:  Covid evaluation, cough, fever.     COMPARISON:  Chest radiograph 2/3/2021 at 11:51 AM.     FINDINGS:    A single portable view of the chest was obtained. The cardiac silhouette  and mediastinal and hilar contours are unchanged. Lungs and pleural  spaces are clear. There is chronic appearing deformity of the proximal  left humerus. There is multilevel degenerative disease. No significant  change from earlier same date chest radiograph.     This report was finalized on 2/3/2021 2:39 PM by Dr. Anisha Martinez M.D.       XR Chest 1 View [[id number]] Collected: 02/03/21 1224     Updated: 02/03/21 1230    Narrative:      XR CHEST 1 VW-  02/03/2021     HISTORY: Chest pain.     The heart size is within normal limits. Lungs appear free of acute  infiltrates. Old proximal left humerus fracture is seen. Bony structures  are otherwise unremarkable.       Impression:      No acute process.     This report was finalized on 2/3/2021 12:27 PM by Dr. Hema Sampson M.D.             Pertinent Labs     Results from last 7 days   Lab Units 02/07/21  0729 02/06/21  0457 02/05/21  0532 02/04/21  0557   WBC 10*3/mm3 4.51 3.42 4.20 4.10   HEMOGLOBIN g/dL 12.6 10.9* 12.6 11.1*   PLATELETS 10*3/mm3 117* 79* 91* 79*     Results from last 7 days   Lab Units 02/05/21  0532 02/04/21  0557 02/03/21  1139   SODIUM mmol/L 136 136 131*   POTASSIUM mmol/L 3.7 3.9 3.7   CHLORIDE mmol/L 104 102 96*   CO2 mmol/L 24.4 24.2 22.7   BUN mg/dL 15 16 21*   CREATININE mg/dL 0.85 0.64 0.85   GLUCOSE mg/dL 199* 208* 298*   Estimated Creatinine Clearance: 78.2 mL/min (by C-G formula based on SCr of 0.85 mg/dL).  Results from last 7 days   Lab Units 02/05/21  0544  02/04/21  0557 02/03/21  1139   ALBUMIN g/dL 3.70 3.40* 4.20   BILIRUBIN mg/dL 1.7* 1.8* 2.2*   ALK PHOS U/L 158* 141* 186*   AST (SGOT) U/L 44* 38* 38*   ALT (SGPT) U/L 43* 35* 40*     Results from last 7 days   Lab Units 02/05/21  0532 02/04/21  0557 02/03/21  1139   CALCIUM mg/dL 9.2 9.4 9.7   ALBUMIN g/dL 3.70 3.40* 4.20   MAGNESIUM mg/dL  --   --  2.0   PHOSPHORUS mg/dL  --   --  2.7     Results from last 7 days   Lab Units 02/03/21  1445 02/03/21  1139   LIPASE U/L  --  14   AMMONIA umol/L 31  --      Results from last 7 days   Lab Units 02/05/21  0532 02/03/21  1139   TROPONIN T ng/mL <0.010 <0.010           Invalid input(s): LDLCALC  Results from last 7 days   Lab Units 02/03/21  1301   URINECX  No growth       Test Results Pending at Discharge       Discharge Details        Discharge Medications      New Medications      Instructions Start Date   DULoxetine 30 MG capsule  Commonly known as: CYMBALTA   30 mg, Oral, Daily   Start Date: February 8, 2021        Changes to Medications      Instructions Start Date   ALPRAZolam 0.5 MG tablet  Commonly known as: XANAX  What changed: reasons to take this   0.5 mg, Oral, 2 Times Daily PRN      cholestyramine light 4 g powder  What changed: how much to take   4 g, Oral, 2 Times Daily      ferrous sulfate 325 (65 FE) MG tablet  What changed: when to take this   TAKE ONE TABLET BY MOUTH TWICE A DAY      Insulin Glargine 100 UNIT/ML injection pen  Commonly known as: LANTUS SOLOSTAR  What changed:   · how much to take  · how to take this  · when to take this  · additional instructions   Inject 70 units in the am and 70 units in the pm      spironolactone 100 MG tablet  Commonly known as: ALDACTONE  What changed:   · when to take this  · reasons to take this   TAKE ONE TABLET BY MOUTH DAILY      Xifaxan 550 MG tablet  Generic drug: riFAXIMin  What changed:   · how much to take  · when to take this   TAKE ONE TABLET BY MOUTH TWICE A DAY         Continue These Medications       Instructions Start Date   COLACE PO   2 capsules, Oral, Every Night at Bedtime      esomeprazole 40 MG capsule  Commonly known as: nexIUM   40 mg, Oral, Every Morning Before Breakfast      FreeStyle Roseline Sensor System   1 Device, Does not apply, Every 14 Days      furosemide 80 MG tablet  Commonly known as: LASIX   80 mg, Oral, Daily PRN      glucose blood test strip   Other, As Needed      hydrOXYzine 25 MG tablet  Commonly known as: ATARAX   50 mg, Oral, Nightly      Insulin Lispro (1 Unit Dial) 100 UNIT/ML solution pen-injector  Commonly known as: HumaLOG KwikPen   INJECT UP TO 60 UNITS THREE TIMES A DAY WITH  MEALS      Intrarosa 6.5 MG insert  Generic drug: Prasterone   1 each, Vaginal, 2 Times Weekly      levETIRAcetam 500 MG tablet  Commonly known as: KEPPRA   500 mg, Oral, 2 Times Daily      Magnesium Oxide 400 MG capsule   400 mg, Oral, Nightly      metaxalone 800 MG tablet  Commonly known as: SKELAXIN   800 mg, Oral, 3 Times Daily      metoclopramide 5 MG tablet  Commonly known as: REGLAN   5 mg, Oral, 4 Times Daily Before Meals & Nightly      MiraLax 17 GM/SCOOP powder  Generic drug: polyethylene glycol   17 g, Oral, Daily PRN      mirtazapine 15 MG tablet  Commonly known as: REMERON   15 mg, Oral, Nightly      multivitamin with minerals tablet tablet   1 tablet, Oral, Daily      Oxaydo 7.5 MG tablet   Generic drug: oxyCODONE HCl   7.5 mg, Oral, Every 8 Hours PRN      pregabalin 75 MG capsule  Commonly known as: LYRICA   75 mg, Oral, 2 Times Daily      Questran 4 g packet  Generic drug: cholestyramine   2 packets, Oral, 2 Times Daily With Meals      sucralfate 1 GM/10ML suspension  Commonly known as: CARAFATE   1 g, Oral, 3 Times Daily      vitamin B-12 1000 MCG tablet  Commonly known as: CYANOCOBALAMIN   1,000 mcg, Oral, Daily      Vitamin D3 25 MCG (1000 UT) capsule   1,000 Units, Oral, Daily             Allergies   Allergen Reactions   • Albuterol Anaphylaxis   • Imitrex [Sumatriptan] Anaphylaxis  "  • Tramadol Nausea Only, Other (See Comments) and GI Intolerance     Per pt causes her \"palsy, meaning shaking and tremors\"    • Nsaids Other (See Comments)     Told by MD not to take due to liver problems   • Tylenol [Acetaminophen] Other (See Comments)     Has liver problems and told by MD to not take   • Zofran [Ondansetron Hcl] Other (See Comments)     Pt states does not work to correct nausea and vomiting.    • Lactulose Nausea And Vomiting and Other (See Comments)     Severe abdominal pain   • Quinine Derivatives Nausea And Vomiting         Discharge Disposition:  Home or Self Care    Discharge Diet:  Diet Order   Procedures   • Diet Regular; Consistent Carbohydrate       Discharge Activity:   Activity Instructions     Activity as Tolerated            CODE STATUS:    Code Status and Medical Interventions:   Ordered at: 02/03/21 1553     Code Status:    CPR     Medical Interventions (Level of Support Prior to Arrest):    Full       Future Appointments   Date Time Provider Department Center   2/12/2021 11:00 AM LAB CHAIR [facility]   2/12/2021 11:30 AM RN REVIEW [facility]   3/12/2021 11:00 AM LAB CHAIR [facility]   3/12/2021 11:30 AM RN REVIEW [facility]   3/17/2021  1:00 PM Kimberly Gray MD [facility] None   4/9/2021 12:20 PM LAB CHAIR [facility]   4/9/2021  1:00 PM Sukhdeep Mcdowell MD [facility]   4/22/2021 12:45 PM Merary Burnett MD [facility] None   4/30/2021  2:15 PM Kulwant Martínez APRN [facility]     Additional Instructions for the Follow-ups that You Need to Schedule     Discharge Follow-up with PCP   As directed       Currently Documented PCP:    uKlwant Martínez APRN    PCP Phone Number:    [phone]     Follow Up Details: 2 weeks         Discharge Follow-up with Specified Provider: ; 1 Month   As directed      To:     Follow Up: 1 " Month         Discharge Follow-up with Specified Provider: ; 2 Weeks   As directed      To:     Follow Up: 2 Weeks           Follow-up Information     Kulwant Martínez APRN .    Specialty: Internal Medicine  Why: 2 weeks  Contact information:  [address]  [place] [address]  [phone]                   Additional Instructions for the Follow-ups that You Need to Schedule     Discharge Follow-up with PCP   As directed       Currently Documented PCP:    Kulwant Martínez APRN    PCP Phone Number:    [phone]     Follow Up Details: 2 weeks         Discharge Follow-up with Specified Provider: ; 1 Month   As directed      To:     Follow Up: 1 Month         Discharge Follow-up with Specified Provider: ; 2 Weeks   As directed      To:     Follow Up: 2 Weeks           Time Spent on Discharge:  Greater than 30 minutes      Rivera Cunningham MD  [facility]  02/07/21  15:37 EST

## 2021-02-07 NOTE — PROGRESS NOTES
Gastroenterology   Inpatient Progress Note    Reason for Follow Up: Nausea and dysphagia    Subjective     Interval History:   Patient is tolerating her breakfast this morning.  Esophagram yesterday showed a cervical web which appeared nonobstructive    Current Facility-Administered Medications:   •  ALPRAZolam (XANAX) tablet 0.5 mg, 0.5 mg, Oral, BID PRN, Mina Gonzalez MD, 0.5 mg at 02/06/21 2233  •  dextrose (D50W) 25 g/ 50mL Intravenous Solution 25 g, 25 g, Intravenous, Q15 Min PRN, Karlene Fernandez, APRN  •  dextrose (GLUTOSE) oral gel 15 g, 15 g, Oral, Q15 Min PRN, Karlene Fernandez APRN  •  DULoxetine (CYMBALTA) DR capsule 30 mg, 30 mg, Oral, Daily, Sukhdeep Rodarte III, MD, 30 mg at 02/07/21 0902  •  furosemide (LASIX) tablet 80 mg, 80 mg, Oral, Daily PRN, Mina Gonzalez MD  •  glucagon (human recombinant) (GLUCAGEN DIAGNOSTIC) injection 1 mg, 1 mg, Subcutaneous, PRN, Karlene Fernandez, APRN  •  Glycerin-Hypromellose- (ARTIFICIAL TEARS) 0.2-0.2-1 % ophthalmic solution solution 1 drop, 1 drop, Left Eye, Q1H PRN, Rivera Cunningham MD, 1 drop at 02/04/21 1551  •  hydrOXYzine (ATARAX) tablet 50 mg, 50 mg, Oral, Nightly, Mina Gonzalez MD, 50 mg at 02/06/21 2017  •  insulin glargine (LANTUS, SEMGLEE) injection 80 Units, 80 Units, Subcutaneous, Nightly, Mina Gonzalez MD, 80 Units at 02/06/21 2130  •  insulin lispro (humaLOG, ADMELOG) injection 0-14 Units, 0-14 Units, Subcutaneous, 4x Daily With Meals & Nightly, Simone Velasquez, APRN, 12 Units at 02/06/21 2130  •  levETIRAcetam (KEPPRA) tablet 500 mg, 500 mg, Oral, BID, Mina Gonzalez MD, 500 mg at 02/07/21 0902  •  metoclopramide (REGLAN) tablet 5 mg, 5 mg, Oral, 4x Daily AC & at Bedtime, Mina Gonzalez MD, 5 mg at 02/07/21 0612  •  mirtazapine (REMERON) tablet 15 mg, 15 mg, Oral, Nightly, Mina Gonzalez MD, 15 mg at 02/06/21 2017  •  oxyCODONE (ROXICODONE) immediate release tablet 5 mg, 5 mg, Oral, Q6H PRN, Octavio,  Rivera Hardy MD, 5 mg at 02/07/21 0428  •  pantoprazole (PROTONIX) injection 40 mg, 40 mg, Intravenous, Q AM, Mina Gonzalez MD, 40 mg at 02/07/21 0612  •  pregabalin (LYRICA) capsule 75 mg, 75 mg, Oral, Q12H, Mina Gonzalez MD, 75 mg at 02/07/21 0902  •  promethazine (PHENERGAN) tablet 12.5 mg, 12.5 mg, Oral, Q6H PRN, Mina Gonzalez MD, 12.5 mg at 02/06/21 0541  •  sodium chloride 0.9 % flush 10 mL, 10 mL, Intravenous, PRN, Emergency, Triage Protocol, MD  •  sodium chloride 0.9 % flush 10 mL, 10 mL, Intravenous, Q12H, Mina Gonzalez MD, 10 mL at 02/07/21 0902  •  sodium chloride 0.9 % flush 10 mL, 10 mL, Intravenous, PRN, Mina Gonzalez MD  •  spironolactone (ALDACTONE) tablet 100 mg, 100 mg, Oral, Daily, Mina Gonzalez MD, 100 mg at 02/07/21 0902  •  sucralfate (CARAFATE) tablet 1 g, 1 g, Oral, 4x Daily AC & at Bedtime, Mina Gonzalez MD, 1 g at 02/07/21 0612  Review of Systems:    The following systems were reviewed and negative;  constitution and gastrointestinal    Objective     Vital Signs  Temp:  [97.8 °F (36.6 °C)-98.7 °F (37.1 °C)] 97.9 °F (36.6 °C)  Heart Rate:  [85-99] 95  Resp:  [18] 18  BP: (131-150)/(71-80) 149/71  Body mass index is 27.42 kg/m².  No intake or output data in the 24 hours ending 02/07/21 0908  No intake/output data recorded.     Physical Exam:   General: patient awake, alert and cooperative   Eyes: no scleral icterus   Skin: warm and dry, not jaundiced   Abdomen: soft, nontender, nondistended; normal bowel sounds, no masses palpated, no periumbical lymphadenopathy   Psychiatric: Appropriate affect and behavior     Results Review:     I reviewed the patient's new clinical results.    Results from last 7 days   Lab Units 02/06/21  0457 02/05/21 0532 02/04/21  0557   WBC 10*3/mm3 3.42 4.20 4.10   HEMOGLOBIN g/dL 10.9* 12.6 11.1*   HEMATOCRIT % 31.5* 35.7 30.9*   PLATELETS 10*3/mm3 79* 91* 79*     Results from last 7 days   Lab Units 02/05/21  0532 02/04/21  0557  02/03/21  1139   SODIUM mmol/L 136 136 131*   POTASSIUM mmol/L 3.7 3.9 3.7   CHLORIDE mmol/L 104 102 96*   CO2 mmol/L 24.4 24.2 22.7   BUN mg/dL 15 16 21*   CREATININE mg/dL 0.85 0.64 0.85   CALCIUM mg/dL 9.2 9.4 9.7   BILIRUBIN mg/dL 1.7* 1.8* 2.2*   ALK PHOS U/L 158* 141* 186*   ALT (SGPT) U/L 43* 35* 40*   AST (SGOT) U/L 44* 38* 38*   GLUCOSE mg/dL 199* 208* 298*     Results from last 7 days   Lab Units 02/03/21  1139   INR  1.15*     Lab Results   Lab Value Date/Time    LIPASE 14 02/03/2021 1139    LIPASE 8 (L) 05/09/2019 2043    LIPASE 9 (L) 01/17/2019 1259    LIPASE 8 (L) 12/13/2018 1900    LIPASE 12 (L) 10/02/2018 2332    LIPASE 9 (L) 05/18/2018 0425    LIPASE 22 12/16/2017 1650    LIPASE 10 (L) 10/30/2017 1732    LIPASE 9 (L) 02/21/2017 1202    LIPASE 16 11/10/2014 0724       Radiology:  FL Esophagram Complete Single Contrast   Final Result      XR Chest AP   Final Result      CT Abdomen Pelvis With Contrast   Final Result   1. Hepatic cirrhosis with splenomegaly. No ascites. Patent TIPS stent in   place.   2. Circumferential wall thickening of the stomach that may suggest   gastritis.       These findings were discussed with Dr. Astudillo by telephone at the time of   dictation.       Radiation dose reduction techniques were utilized, including automated   exposure control and exposure modulation based on body size.       This report was finalized on 2/4/2021 10:12 AM by Dr. Rose Lin M.D.          XR Chest 1 View   Final Result   No acute process.       This report was finalized on 2/3/2021 12:27 PM by Dr. Hema Sampson M.D.              Assessment/Plan     Patient Active Problem List   Diagnosis   • Cirrhosis of liver (CMS/HCC)   • Rheumatoid arthritis (CMS/HCC)   • Anxiety and depression   • Type 2 diabetes mellitus, uncontrolled, with neuropathy (CMS/HCC)   • Fibrositis   • Change in blood platelet count   • Pancytopenia (CMS/HCC)   • Hypersplenism   • Nausea & vomiting   • DUKES (nonalcoholic  steatohepatitis)   • Hyperlipidemia   • Abdominal pain   • Hematemesis   • Ascites   • Portal hypertension (CMS/HCC)   • Systemic lupus (CMS/HCC)   • Secondary esophageal varices without bleeding (CMS/HCC)   • Esophageal varices with bleeding (CMS/HCC)   • Gastroparesis   • Cirrhosis of liver with ascites (CMS/HCC)   • Diabetic ketoacidosis without coma associated with type 2 diabetes mellitus (CMS/HCC)   • Diabetic peripheral neuropathy (CMS/HCC)   • History of seizure disorder   • Hepatic encephalopathy (CMS/HCC)   • Thrombocytopenia (CMS/HCC)   • Fever   • Acute ITP (CMS/HCC)   • Degeneration of lumbosacral intervertebral disc   • Seizure (CMS/HCC)   • Spondylosis without myelopathy   • Intractable vomiting with nausea   • UGI bleed   • Migraine without aura   • Urinary retention   • Uncontrolled diabetes mellitus with hyperglycemia (CMS/HCC)   • BRICE (obstructive sleep apnea)   • Gastroparesis   • Hyperammonemia (CMS/HCC)   • Hyponatremia   • Anemia   • Vitamin D insufficiency   • Hyperglycemia   • Dehydration   • Iron deficiency anemia   • Adverse effect of iron or its compound, subsequent encounter   • Hemorrhage of anus and rectum   • Vulvar lesion   • Acute upper GI bleed   • Acute blood loss anemia   • Acute hyperkalemia   • Labial cyst   • Transaminitis   • Hyperbilirubinemia   • Acute gastritis   • UTI (urinary tract infection), bacterial   • UTI (urinary tract infection)       Assessment:  1. Dysphagia.  Patient describes dysphagia shortly after the initiation of the swallow which would be consistent with the finding of the cervical esophageal web.  This may be addressed with endoscopic evaluation and dilation as an outpatient.  2. Wong cirrhosis.  Stable.  3. Hepatic encephalopathy.  Improved.  Patient's mental status is appropriate.  4. Nutrition.  Patient tolerating diet.  5. Gastroparesis.  Stable.  Patient has chronic nausea related to this.  6. Anemia.  Mild.  Chronic.  No overt  bleeding.      Plan:  · Patient stable for discharge from our standpoint.  Would plan outpatient EGD with Dr. Gray who has her primary gastroenterologist.  She may follow-up with Dr. Gray in the next couple of weeks as an outpatient.  · Diet as tolerated  I discussed the patients findings and my recommendations with patient and nursing staff.    MD Bob Santana M.D.  [facility] [place] [address]  Office: [phone]

## 2021-02-07 NOTE — OUTREACH NOTE
Prep Survey      Responses   [facility] patient discharged from?  [place]   Is LACE score < 7 ?  No   Emergency Room discharge w/ pulse ox?  No   Eligibility  [facility]   Date of Admission  02/03/21   Date of Discharge  02/07/21   Discharge Disposition  Home or Self Care   Discharge diagnosis  Intractable N/V/D d/t acute gastritis, Acute UTI, T2DM   Does the patient have one of the following disease processes/diagnoses(primary or secondary)?  Other   Does the patient have Home health ordered?  No   Is there a DME ordered?  No   Prep survey completed?  Yes          Elvira Mendoza RN

## 2021-02-07 NOTE — PLAN OF CARE
Goal Outcome Evaluation:  Plan of Care Reviewed With: patient  Progress: no change  Outcome Summary: Medicated for pain once. Medicated for anxiety once with relief. A&Ox4. Medications administered per orders. Monitoring blood glucose. Up with assist to the BSC. VSS. No s/s of distress at this time. Will continue to monitor.

## 2021-02-08 ENCOUNTER — TRANSITIONAL CARE MANAGEMENT TELEPHONE ENCOUNTER (OUTPATIENT)
Dept: CALL CENTER | Facility: HOSPITAL | Age: 61
End: 2021-02-08

## 2021-02-08 ENCOUNTER — TELEPHONE (OUTPATIENT)
Dept: ONCOLOGY | Facility: CLINIC | Age: 61
End: 2021-02-08

## 2021-02-08 RX ORDER — LEVETIRACETAM 500 MG/1
TABLET ORAL
Qty: 180 TABLET | Refills: 3 | Status: SHIPPED | OUTPATIENT
Start: 2021-02-08 | End: 2022-01-31

## 2021-02-08 NOTE — OUTREACH NOTE
Call Center TCM Note      Responses   [facility] patient discharged from?  [place]   Does the patient have one of the following disease processes/diagnoses(primary or secondary)?  Other   TCM attempt successful?  Yes   Discharge diagnosis  Intractable N/V/D d/t acute gastritis, Acute UTI, T2DM   Meds reviewed with patient/caregiver?  Yes   Is the patient having any side effects they believe may be caused by any medication additions or changes?  No   Prescription comments  Pt is waiting to start new rx Cymbalta until TCM FWP with PCP tomorrow. There may be contraindication due to pt Liver cirrhosis   Does the patient have a primary care provider?   Yes   Does the patient have an appointment with their PCP within 7 days of discharge?  Yes   Comments regarding PCP  Kulwant MALDONADO  TCM FWP is 02/09/2021   Has the patient kept scheduled appointments due by today?  N/A   Has home health visited the patient within 72 hours of discharge?  N/A   Psychosocial issues?  No   Did the patient receive a copy of their discharge instructions?  Yes   Nursing interventions  Reviewed instructions with patient   What is the patient's perception of their health status since discharge?  Improving   Is the patient/caregiver able to teach back signs and symptoms related to disease process for when to call PCP?  Yes   Is the patient/caregiver able to teach back signs and symptoms related to disease process for when to call 911?  Yes   Is the patient/caregiver able to teach back the hierarchy of who to call/visit for symptoms/problems? PCP, Specialist, Home health nurse, Urgent Care, ED, 911  Yes   TCM call completed?  Yes   Wrap up additional comments  Pt feeling a littel better, Tired, still some urinary urgency, discomfort. No fever, chills. GI symptoms improved. TCM FWP is 02/09/2021Pt is waiting to start new rx Cymbalta until TCM FWP with PCP tomorrow. There may be contraindication due to pt Liver cirrhosis          Blanca Flor  LUI Jauregui    2/8/2021, 16:06 EST

## 2021-02-08 NOTE — TELEPHONE ENCOUNTER
Provider: SHERIE  Caller: VERNA LUTHER  Relationship to Patient: SELF    Phone Number: [phone]  Reason for Call: PT WANTS TO SWITCH HER LAB AND RN VIEW APT TO [facility] Friday 02/12/21 FROM [place]

## 2021-02-08 NOTE — PROGRESS NOTES
Case Management Discharge Note      Final Note: Pt discharged home on 2/7.   STEVAN Rayo RN         Selected Continued Care - Discharged on 2/7/2021 Admission date: 2/3/2021 - Discharge disposition: Home or Self Care    Destination    No services have been selected for the patient.              Durable Medical Equipment    No services have been selected for the patient.              Dialysis/Infusion    No services have been selected for the patient.              Home Medical Care    No services have been selected for the patient.              Therapy    No services have been selected for the patient.              Community Resources    No services have been selected for the patient.                  Transportation Services  Private: Car    Final Discharge Disposition Code: 01 - home or self-care

## 2021-02-09 PROBLEM — L29.8 CHOLESTATIC PRURITUS: Status: ACTIVE | Noted: 2020-05-20

## 2021-02-09 PROBLEM — E44.0 PROTEIN-CALORIE MALNUTRITION, MODERATE (HCC): Status: ACTIVE | Noted: 2020-05-20

## 2021-02-09 PROBLEM — K76.0 FATTY LIVER DISEASE, NONALCOHOLIC: Status: ACTIVE | Noted: 2020-05-20

## 2021-02-10 ENCOUNTER — TELEPHONE (OUTPATIENT)
Dept: GASTROENTEROLOGY | Facility: CLINIC | Age: 61
End: 2021-02-10

## 2021-02-10 NOTE — TELEPHONE ENCOUNTER
Please let her know that the Doppler study of her TIPS shunt shows no abnormalities-TIPS is patent and follow-up with me as scheduled

## 2021-02-12 ENCOUNTER — APPOINTMENT (OUTPATIENT)
Dept: LAB | Facility: HOSPITAL | Age: 61
End: 2021-02-12

## 2021-02-12 ENCOUNTER — OFFICE VISIT (OUTPATIENT)
Dept: INTERNAL MEDICINE | Age: 61
End: 2021-02-12

## 2021-02-12 ENCOUNTER — LAB (OUTPATIENT)
Dept: LAB | Facility: HOSPITAL | Age: 61
End: 2021-02-12

## 2021-02-12 ENCOUNTER — CLINICAL SUPPORT (OUTPATIENT)
Dept: ONCOLOGY | Facility: HOSPITAL | Age: 61
End: 2021-02-12

## 2021-02-12 ENCOUNTER — APPOINTMENT (OUTPATIENT)
Dept: ONCOLOGY | Facility: HOSPITAL | Age: 61
End: 2021-02-12

## 2021-02-12 ENCOUNTER — APPOINTMENT (OUTPATIENT)
Dept: OTHER | Facility: HOSPITAL | Age: 61
End: 2021-02-12

## 2021-02-12 VITALS
HEART RATE: 97 BPM | SYSTOLIC BLOOD PRESSURE: 140 MMHG | WEIGHT: 180.8 LBS | BODY MASS INDEX: 28.38 KG/M2 | OXYGEN SATURATION: 100 % | TEMPERATURE: 97.3 F | HEIGHT: 67 IN | DIASTOLIC BLOOD PRESSURE: 68 MMHG

## 2021-02-12 DIAGNOSIS — N30.00 ACUTE CYSTITIS WITHOUT HEMATURIA: ICD-10-CM

## 2021-02-12 DIAGNOSIS — D50.9 IRON DEFICIENCY ANEMIA, UNSPECIFIED IRON DEFICIENCY ANEMIA TYPE: ICD-10-CM

## 2021-02-12 DIAGNOSIS — K74.69 OTHER CIRRHOSIS OF LIVER (HCC): ICD-10-CM

## 2021-02-12 DIAGNOSIS — K31.84 GASTROPARESIS: ICD-10-CM

## 2021-02-12 DIAGNOSIS — K76.6 PORTAL HYPERTENSION (HCC): ICD-10-CM

## 2021-02-12 DIAGNOSIS — D69.6 THROMBOCYTOPENIA (HCC): ICD-10-CM

## 2021-02-12 DIAGNOSIS — D69.3 ACUTE ITP (HCC): ICD-10-CM

## 2021-02-12 DIAGNOSIS — D73.1 HYPERSPLENISM: ICD-10-CM

## 2021-02-12 DIAGNOSIS — M32.9 SYSTEMIC LUPUS ERYTHEMATOSUS, UNSPECIFIED SLE TYPE, UNSPECIFIED ORGAN INVOLVEMENT STATUS (HCC): ICD-10-CM

## 2021-02-12 DIAGNOSIS — Z09 HOSPITAL DISCHARGE FOLLOW-UP: Primary | ICD-10-CM

## 2021-02-12 DIAGNOSIS — K29.70 GASTRITIS, PRESENCE OF BLEEDING UNSPECIFIED, UNSPECIFIED CHRONICITY, UNSPECIFIED GASTRITIS TYPE: ICD-10-CM

## 2021-02-12 LAB
BASOPHILS # BLD AUTO: 0.03 10*3/MM3 (ref 0–0.2)
BASOPHILS NFR BLD AUTO: 0.8 % (ref 0–1.5)
DEPRECATED RDW RBC AUTO: 51.9 FL (ref 37–54)
EOSINOPHIL # BLD AUTO: 0.01 10*3/MM3 (ref 0–0.4)
EOSINOPHIL NFR BLD AUTO: 0.3 % (ref 0.3–6.2)
ERYTHROCYTE [DISTWIDTH] IN BLOOD BY AUTOMATED COUNT: 16.6 % (ref 12.3–15.4)
HCT VFR BLD AUTO: 30.9 % (ref 34–46.6)
HGB BLD-MCNC: 10.9 G/DL (ref 12–15.9)
IMM GRANULOCYTES # BLD AUTO: 0.09 10*3/MM3 (ref 0–0.05)
IMM GRANULOCYTES NFR BLD AUTO: 2.5 % (ref 0–0.5)
LYMPHOCYTES # BLD AUTO: 0.93 10*3/MM3 (ref 0.7–3.1)
LYMPHOCYTES NFR BLD AUTO: 26.3 % (ref 19.6–45.3)
MCH RBC QN AUTO: 30.7 PG (ref 26.6–33)
MCHC RBC AUTO-ENTMCNC: 35.3 G/DL (ref 31.5–35.7)
MCV RBC AUTO: 87 FL (ref 79–97)
MONOCYTES # BLD AUTO: 0.45 10*3/MM3 (ref 0.1–0.9)
MONOCYTES NFR BLD AUTO: 12.7 % (ref 5–12)
NEUTROPHILS NFR BLD AUTO: 2.03 10*3/MM3 (ref 1.7–7)
NEUTROPHILS NFR BLD AUTO: 57.4 % (ref 42.7–76)
NRBC BLD AUTO-RTO: 0 /100 WBC (ref 0–0.2)
PLATELET # BLD AUTO: 74 10*3/MM3 (ref 140–450)
PMV BLD AUTO: 10.6 FL (ref 6–12)
RBC # BLD AUTO: 3.55 10*6/MM3 (ref 3.77–5.28)
WBC # BLD AUTO: 3.54 10*3/MM3 (ref 3.4–10.8)

## 2021-02-12 PROCEDURE — 99495 TRANSJ CARE MGMT MOD F2F 14D: CPT | Performed by: NURSE PRACTITIONER

## 2021-02-12 PROCEDURE — 1111F DSCHRG MED/CURRENT MED MERGE: CPT | Performed by: NURSE PRACTITIONER

## 2021-02-12 PROCEDURE — 85025 COMPLETE CBC W/AUTO DIFF WBC: CPT

## 2021-02-12 PROCEDURE — G0463 HOSPITAL OUTPT CLINIC VISIT: HCPCS

## 2021-02-12 PROCEDURE — 36415 COLL VENOUS BLD VENIPUNCTURE: CPT

## 2021-02-12 RX ORDER — CALCIUM CARBONATE/VITAMIN D3 500-10/5ML
400 LIQUID (ML) ORAL NIGHTLY
Qty: 90 EACH | Refills: 1 | Status: SHIPPED | OUTPATIENT
Start: 2021-02-12 | End: 2022-05-11

## 2021-02-12 NOTE — NURSING NOTE
Pt here for CBC and RN review. CBC reviewed with pt. Hgb 10.9 and plts 74. Pt stated she feels much better than she did since she's been out of the hospital, still fatigued. Pt denies any bleeding or bruising at this time. Labs reviewed with Dr. Mcdowell. No changes at this time per Dr. Mcdowell, pt still okay to come back in 1 month for RN review. Told pt and she v/u. Copy of labs given to pt and f/u appt reviewed. Pt v/u to call with any new concerns of bleeding/bruising or increased fatigues.       Lab Results   Component Value Date    WBC 3.54 02/12/2021    HGB 10.9 (L) 02/12/2021    HCT 30.9 (L) 02/12/2021    MCV 87.0 02/12/2021    PLT 74 (L) 02/12/2021

## 2021-02-12 NOTE — PROGRESS NOTES
Transitional Care Follow Up Visit  Subjective     Silvia Zabala is a 60 y.o. female who presents for a transitional care management visit.    Within 48 business hours after discharge our office contacted her via telephone to coordinate her care and needs.      I reviewed and discussed the details of that call along with the discharge summary, hospital problems, inpatient lab results, inpatient diagnostic studies, and consultation reports with Silvia.     Current outpatient and discharge medications have been reconciled for the patient.  Reviewed by: JOEL Sher      Date of TCM Phone Call 2/7/2021   [facility]   Date of Admission 2/3/2021   Date of Discharge 2/7/2021   Discharge Disposition Home or Self Care     Risk for Readmission (LACE) Score: 10 (2/7/2021  6:00 AM)      History of Present Illness   Course During Hospital Stay: Patient presented to the hospital February 3 after having severe nausea and vomiting and generalized abdominal pain that began at 2 AM that morning.  She had 10+ episodes of nonbloody emesis as well as 5 episodes of watery and nonbloody diarrhea.  She had recently had a very poor appetite and complained of severe burning in her chest every time she tried to eat or drink anything for the last 2 days prior to ED admission.  At time of presentation she stated that her symptoms felt similar to gastroparesis.  Her blood sugar for presenting to the emergency room was over 500.  She took 80 units of Lantus and 60 units of humulog.  Abdominal CT was performed and showed her chronic conditions at baseline.  She has chronic hepatic cirrhosis with splenomegaly, TIPS stent in place, and gastritis.  She was given IV Protonix, Reglan, Pepcid, morphine, and IV fluids which she stated improved her condition quickly.  Upon recheck of her blood sugar it was in the low 200s.    Due to the above problems she was admitted for acute gastritis and was started on IV Protonix.  She was also found  to have a urinary tract infection in which she was started on IV Rocephin.  Her gastroparesis IV Reglan was started.    Hematology was consulted.  Patient currently sees Dr. Mcdowell.  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  No IV iron was needed with current CBC in the hospital.    Psychiatry was also consulted regarding anxiety and depression.  Current treatment with Remeron and Xanax as needed.  Psychiatrist inpatient added Cymbalta with initial dose of 30 mg daily to her regimen.  She has yet to take this medication due to fear of liver enzyme elevation.  She is calling him today to reevaluate this.  She is also getting set up with outpatient psychiatrist    CMP and CBC in patient or patient's baseline.  She is no longer having fever or chills or any increased urination due to UTI.  Blood sugars are back in her normal range of 240s.      Today patient states that her GI upset is back to her baseline.  Not vomited many days.  Platelets at discharge were 117 which is higher than her normal.  She does not have follow-up with endocrinology until April.  Diabetes will need to be treated by primary care at this time.     The following portions of the patient's history were reviewed and updated as appropriate: allergies, current medications, past family history, past medical history, past social history, past surgical history and problem list.    Review of Systems   Constitutional neg except per HPI   Resp neg  CV neg  GI baseline nausea, discomfort     Objective   Physical Exam   Constitutional  No distress  Cardiovascular Rate  normal . Rhythm: regular . Heart sounds:  normal  Pulmonary/Chest  Effort normal. Breath sounds:  normal  Psychiatric  Alert. Judgment and thought content normal. Mood normal     Assessment/Plan   Problems Addressed this Visit        Genitourinary and Reproductive     UTI (urinary tract infection)      Other Visit Diagnoses     Hospital  discharge follow-up    -  Primary    Gastritis, presence of bleeding unspecified, unspecified chronicity, unspecified gastritis type        Relevant Medications    Magnesium Oxide 400 MG capsule      Diagnoses       Codes Comments    Hospital discharge follow-up    -  Primary ICD-10-CM: Z09  ICD-9-CM: V67.59     Gastritis, presence of bleeding unspecified, unspecified chronicity, unspecified gastritis type     ICD-10-CM: K29.70  ICD-9-CM: 535.50     Acute cystitis without hematuria     ICD-10-CM: N30.00  ICD-9-CM: 595.0           · With inability to get into endocrinology until April we will continue Lantus and Humalog.  Patient advised to continue sliding scale of Humalog.  Increase Lantus by 3 units every 3 days if above 150.  · Continue follow-up with gastroenterology with need for EGD.  · We will refill magnesium oxide at patient's request.  · Follow-up as scheduled in for Medicare annual wellness and medical follow-up.

## 2021-02-17 ENCOUNTER — READMISSION MANAGEMENT (OUTPATIENT)
Dept: CALL CENTER | Facility: HOSPITAL | Age: 61
End: 2021-02-17

## 2021-02-17 DIAGNOSIS — F41.9 ANXIETY: ICD-10-CM

## 2021-02-17 RX ORDER — ALPRAZOLAM 0.5 MG/1
0.5 TABLET ORAL 2 TIMES DAILY PRN
Qty: 60 TABLET | Refills: 0 | Status: SHIPPED | OUTPATIENT
Start: 2021-02-17 | End: 2021-03-23 | Stop reason: SDUPTHER

## 2021-02-17 RX ORDER — ALPRAZOLAM 0.5 MG/1
0.5 TABLET ORAL 2 TIMES DAILY PRN
Qty: 60 TABLET | Refills: 0 | Status: SHIPPED | OUTPATIENT
Start: 2021-02-17 | End: 2021-02-17

## 2021-02-17 NOTE — OUTREACH NOTE
Medical Week 2 Survey      Responses   [facility] patient discharged from?  [place]   Does the patient have one of the following disease processes/diagnoses(primary or secondary)?  Other   Week 2 attempt successful?  No   Unsuccessful attempts  Attempt 1          Masood Rey RN

## 2021-02-18 ENCOUNTER — READMISSION MANAGEMENT (OUTPATIENT)
Dept: CALL CENTER | Facility: HOSPITAL | Age: 61
End: 2021-02-18

## 2021-02-18 NOTE — OUTREACH NOTE
Medical Week 2 Survey      Responses   [facility] patient discharged from?  [place]   Does the patient have one of the following disease processes/diagnoses(primary or secondary)?  Other   Week 2 attempt successful?  Yes   Call start time  1441   Discharge diagnosis  Intractable N/V/D d/t acute gastritis, Acute UTI, T2DM   Call end time  1448   Is patient permission given to speak with other caregiver?  No   Meds reviewed with patient/caregiver?  Yes   Is the patient having any side effects they believe may be caused by any medication additions or changes?  No   Does the patient have all medications ordered at discharge?  Yes   Is the patient taking all medications as directed (includes completed medication regime)?  Yes   Does the patient have a primary care provider?   Yes   Does the patient have an appointment with their PCP within 7 days of discharge?  Yes   Comments regarding PCP  Kulwant MALDONADO. Patient has seen PCP since discharge.    Has the patient kept scheduled appointments due by today?  N/A   Has home health visited the patient within 72 hours of discharge?  N/A   Psychosocial issues?  No   Did the patient receive a copy of their discharge instructions?  Yes   Nursing interventions  Reviewed instructions with patient   What is the patient's perception of their health status since discharge?  Same [Patient reports that she continues to feel poorly with no appetite, only intake is boost and liquids. ]   Is the patient/caregiver able to teach back signs and symptoms related to disease process for when to call PCP?  Yes   Is the patient/caregiver able to teach back signs and symptoms related to disease process for when to call 911?  Yes   Is the patient/caregiver able to teach back the hierarchy of who to call/visit for symptoms/problems? PCP, Specialist, Home health nurse, Urgent Care, ED, 911  Yes   If the patient is a current smoker, are they able to teach back resources for cessation?  Not a  smoker   Week 2 Call Completed?  Yes          Blanca Emmanuel RN

## 2021-02-24 ENCOUNTER — READMISSION MANAGEMENT (OUTPATIENT)
Dept: CALL CENTER | Facility: HOSPITAL | Age: 61
End: 2021-02-24

## 2021-02-24 NOTE — OUTREACH NOTE
Medical Week 3 Survey      Responses   [facility] patient discharged from?  [place]   Does the patient have one of the following disease processes/diagnoses(primary or secondary)?  Other   Week 3 attempt successful?  No   Unsuccessful attempts  Attempt 1          Princess Cid RN

## 2021-03-01 ENCOUNTER — READMISSION MANAGEMENT (OUTPATIENT)
Dept: CALL CENTER | Facility: HOSPITAL | Age: 61
End: 2021-03-01

## 2021-03-01 NOTE — OUTREACH NOTE
Medical Week 3 Survey      Responses   [facility] patient discharged from?  [place]   Does the patient have one of the following disease processes/diagnoses(primary or secondary)?  Other   Week 3 attempt successful?  Yes   Call start time  1552   Call end time  1557   Meds reviewed with patient/caregiver?  Yes   Is the patient taking all medications as directed (includes completed medication regime)?  Yes   Has the patient kept scheduled appointments due by today?  Yes   Comments  Trying to get appt. earlier with MERLENE Justice earlier than 3/17   Comments  nausea and gagging and vomiting, not eating well, cannot keep food down and no appetite, trying to get GI appt. nearer   What is the patient's perception of their health status since discharge?  Same [not feeling much better than when was in hospital, has some swelling but nausea and gagging worse]   Additional teach back comments  Still poor appetiteand gagging,not keeping food down no appetite   Week 3 Call Completed?  Yes   Wrap up additional comments  Not feeling a whole lot better, nausea continues and gagging, trying to get into GI [facility] sooner than 3/17, she  is calling GI now          Eugenia Weston RN

## 2021-03-02 ENCOUNTER — TELEPHONE (OUTPATIENT)
Dept: INTERNAL MEDICINE | Age: 61
End: 2021-03-02

## 2021-03-02 NOTE — TELEPHONE ENCOUNTER
Pt notified of currently working on Handicap Parking Permit.  Pt also need medical dx print off to get COVID vaccine. Pt was advised to go to her [employer] and print problem list from home. Pt verbalized understanding. MM

## 2021-03-02 NOTE — TELEPHONE ENCOUNTER
PATIENT CALLED BACK TO INQUIRE ABOUT RECEIVING A LETTER STATING THAT SHE QUALIFIES TO RECEIVE THE COVID VACCINE DUE TO HEALTH ISSUES. PLEASE CONTACT PATIENT TO ADVISE.     THANKS

## 2021-03-02 NOTE — TELEPHONE ENCOUNTER
PT STATES THAT SHE WOULD LIKE TO REQUEST THE PAPERWORK FOR THE HANDICAP PLACARDS. PT STATES SHE WOULD LIKE THE PAPERWORK MAILED.     ADDRESS: [address] [place] [address]    CALL BACK [phone]

## 2021-03-08 NOTE — PROGRESS NOTES
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  Chronic anemia with evidence of iron deficiency.      Interval History: The patient today has no new complaints.  She continues with her chronic abdominal bloating.  She does have ongoing fatigue.        Review of Systems:    Review of systems was obtained with pertinent positive findings as noted in the interval history.  All other systems negative.    Objective     Vital Signs  Temp:  [98 °F (36.7 °C)-98.2 °F (36.8 °C)] 98.2 °F (36.8 °C)  Heart Rate:  [] 83  Resp:  [16] 16  BP: (112-128)/(65-74) 112/65        Physical Exam:     GENERAL:  No apparent distress   CHEST:  Lungs clear to percussion and auscultation. Good airflow.  CARDIAC:  Regular rate and rhythm without murmurs, rubs or gallops. Normal S1,S2.  ABDOMEN:  Slightly distended, mild generalized tenderness to palpation, no rebound or guarding.  EXTREMITIES:  No clubbing, cyanosis or edema.  NEUROLOGICAL:  Cranial Nerves II-XII grossly intact.  No focal neurological deficits.  PSYCHIATRIC:  Normal affect and mood.           Results Review:     I reviewed the patient's new clinical results.    Results from last 7 days   Lab Units 01/22/19  0427   WBC 10*3/mm3 1.72*   HEMOGLOBIN g/dL 10.3*   HEMATOCRIT % 32.6*   PLATELETS 10*3/mm3 53*     Lab Results   Component Value Date    NEUTROABS 0.91 (C) 01/22/2019     Results from last 7 days   Lab Units 01/22/19  0427   SODIUM mmol/L 139   POTASSIUM mmol/L 4.0   CHLORIDE mmol/L 105   CO2 mmol/L 22.6   BUN mg/dL 11   CREATININE mg/dL 0.70   GLUCOSE mg/dL 233*   CALCIUM mg/dL 8.9     Results from last 7 days   Lab Units 01/17/19  1259   INR  1.27*   APTT seconds 31.4     Results from last 7 days   Lab Units 01/17/19  1259   MAGNESIUM mg/dL 1.9         Medication Review: Yes     Assessment/Plan     1. DUKES/cirrhosis with associated hypersplenism:  · Most recent imaging of abdomen 10/3/18  ----- Message from Julio Garrido CNP sent at 3/8/2021 12:53 PM CST -----  Throat culture +, pt treated with amox, call and notify mom. To continue and complete abx and f/u w/ pcp as instructed   with CT scan showing 17.6 cm spleen as well as cirrhotic liver and portal hypertension.  Ill-defined abnormality in right hepatic lobe with subsequent MRI showing no suspicious abnormalities.  2. Chronic thrombocytopenia secondary to cirrhosis/hypersplenism with prior history of ITP:  · Baseline thrombocytopenia secondary to cirrhosis/hypersplenism, platelet count in 70-low 100,000 range  · Subsequent acute ITP in May 2018 with platelet count down to 3000.  Treated with IVIG and steroids with subsequent rituximab weekly ×4 completed 7/16/18.  · Patient has maintained remission from ITP  · Platelet count on admission at baseline however has declined into the 50-44206 range currently.  Patient is experiencing no bleeding issues.    · Laboratory evaluation 1/21/19 with folate greater than 20, B12 1221, IPF slightly elevated at 7.2%.  · Today, platelet count stable at 53,000.  This may represent the patient's new baseline.  IPF is only minimally elevated, likely related to platelet distraction from hypersplenism.  We will monitor platelet count as outpatient.  3. Chronic leukopenia secondary to cirrhosis/hypersplenism:  · Chronic WBC and 1-2000 range.  · Today, WBC stable at 1.72 with .  4. Chronic normocytic anemia:  · Patient with hemoglobin in the 9-10 range.  · Per records, patient with prior history of iron deficiency as well as B12 deficiency  · No recent clinical evidence of GI blood loss.  · Anemia evaluation sent 1/21/19 with iron 28, ferritin 29.8, iron saturation 6%, TIBC 459, folate greater than 20, B12 1221.  · I discussed findings consistent with iron deficiency with the patient today.  She has received IV iron apparently in the past.  I have suggested that we treat her again with IV iron.  She is being discharged however from the hospital today.  We will make arrangements for her to return to our office in one week and again in 2 weeks to receive Injectafer.  5. Mixed connective tissue  disorder:  · Patient previously treated with enbrel.  She reports that this was held beginning in May 2018 at the time of diagnosis of ITP.  She has remained off of specific treatment for her rheumatologic condition since then.  · The patient had been maintained on low-dose prednisone after treatment for ITP with prednisone 10 mg daily.  Patient tapered herself off prednisone in November 2018 while in [place].  6. Diabetes mellitus with gastroparesis:     Plan:  1. Noted that patient is being discharged from the hospital today.  2. Arrange in 1 week and again in 2 weeks CBC and Injectafer  3. The patient will return to see Dr. Mcdowell in followup in 4 weeks with cbc, iron panel, ferritin.          Kervin Rajput MD  01/22/19  2:45 PM

## 2021-03-09 ENCOUNTER — READMISSION MANAGEMENT (OUTPATIENT)
Dept: CALL CENTER | Facility: HOSPITAL | Age: 61
End: 2021-03-09

## 2021-03-09 NOTE — OUTREACH NOTE
Medical Week 4 Survey      Responses   [facility] patient discharged from?  [facility]   Does the patient have one of the following disease processes/diagnoses(primary or secondary)?  Other   Week 4 attempt successful?  Yes   Call start time  1512   Call end time  1518   Discharge diagnosis  Intractable N/V/D d/t acute gastritis, Acute UTI, T2DM   Meds reviewed with patient/caregiver?  Yes   Is the patient taking all medications as directed (includes completed medication regime)?  No   Medication comments  Not taking Cymbalta, waiting to see Psych MD   Has the patient kept scheduled appointments due by today?  Yes   Comments  Dr Gray 03/17/2021      Is the patient still receiving Home Health Services?  N/A   Psychosocial issues?  No   Comments  Nausea and vomiting   What is the patient's perception of their health status since discharge?  Same   Additional teach back comments  Nausea/vomiting gagging, not keeeping food down. Taking Boost/Glucerna   Week 4 Call Completed?  Yes   Would the patient like one additional call?  Yes          Charisma Cardoza RN

## 2021-03-12 ENCOUNTER — APPOINTMENT (OUTPATIENT)
Dept: WOMENS IMAGING | Facility: HOSPITAL | Age: 61
End: 2021-03-12

## 2021-03-12 ENCOUNTER — CLINICAL SUPPORT (OUTPATIENT)
Dept: ONCOLOGY | Facility: HOSPITAL | Age: 61
End: 2021-03-12

## 2021-03-12 ENCOUNTER — LAB (OUTPATIENT)
Dept: OTHER | Facility: HOSPITAL | Age: 61
End: 2021-03-12

## 2021-03-12 VITALS
SYSTOLIC BLOOD PRESSURE: 116 MMHG | DIASTOLIC BLOOD PRESSURE: 74 MMHG | RESPIRATION RATE: 18 BRPM | HEART RATE: 95 BPM | WEIGHT: 180.6 LBS | HEIGHT: 67 IN | OXYGEN SATURATION: 99 % | BODY MASS INDEX: 28.35 KG/M2 | TEMPERATURE: 97.8 F

## 2021-03-12 DIAGNOSIS — D50.9 IRON DEFICIENCY ANEMIA, UNSPECIFIED IRON DEFICIENCY ANEMIA TYPE: ICD-10-CM

## 2021-03-12 DIAGNOSIS — K31.84 GASTROPARESIS: ICD-10-CM

## 2021-03-12 DIAGNOSIS — D69.6 THROMBOCYTOPENIA (HCC): ICD-10-CM

## 2021-03-12 DIAGNOSIS — D69.3 ACUTE ITP (HCC): ICD-10-CM

## 2021-03-12 DIAGNOSIS — D73.1 HYPERSPLENISM: ICD-10-CM

## 2021-03-12 DIAGNOSIS — K76.6 PORTAL HYPERTENSION (HCC): ICD-10-CM

## 2021-03-12 DIAGNOSIS — K74.69 OTHER CIRRHOSIS OF LIVER (HCC): ICD-10-CM

## 2021-03-12 DIAGNOSIS — M32.9 SYSTEMIC LUPUS ERYTHEMATOSUS, UNSPECIFIED SLE TYPE, UNSPECIFIED ORGAN INVOLVEMENT STATUS (HCC): ICD-10-CM

## 2021-03-12 LAB
BASOPHILS # BLD AUTO: 0.03 10*3/MM3 (ref 0–0.2)
BASOPHILS NFR BLD AUTO: 0.8 % (ref 0–1.5)
DEPRECATED RDW RBC AUTO: 50.6 FL (ref 37–54)
EOSINOPHIL # BLD AUTO: 0.03 10*3/MM3 (ref 0–0.4)
EOSINOPHIL NFR BLD AUTO: 0.8 % (ref 0.3–6.2)
ERYTHROCYTE [DISTWIDTH] IN BLOOD BY AUTOMATED COUNT: 15.9 % (ref 12.3–15.4)
FERRITIN SERPL-MCNC: 107.4 NG/ML (ref 13–150)
HCT VFR BLD AUTO: 31.8 % (ref 34–46.6)
HGB BLD-MCNC: 11 G/DL (ref 12–15.9)
IMM GRANULOCYTES # BLD AUTO: 0.06 10*3/MM3 (ref 0–0.05)
IMM GRANULOCYTES NFR BLD AUTO: 1.6 % (ref 0–0.5)
IRON 24H UR-MRATE: 93 MCG/DL (ref 37–145)
IRON SATN MFR SERPL: 20 % (ref 20–50)
LYMPHOCYTES # BLD AUTO: 0.58 10*3/MM3 (ref 0.7–3.1)
LYMPHOCYTES NFR BLD AUTO: 15.7 % (ref 19.6–45.3)
MCH RBC QN AUTO: 30.5 PG (ref 26.6–33)
MCHC RBC AUTO-ENTMCNC: 34.6 G/DL (ref 31.5–35.7)
MCV RBC AUTO: 88.1 FL (ref 79–97)
MONOCYTES # BLD AUTO: 0.29 10*3/MM3 (ref 0.1–0.9)
MONOCYTES NFR BLD AUTO: 7.8 % (ref 5–12)
NEUTROPHILS NFR BLD AUTO: 2.71 10*3/MM3 (ref 1.7–7)
NEUTROPHILS NFR BLD AUTO: 73.3 % (ref 42.7–76)
NRBC BLD AUTO-RTO: 0 /100 WBC (ref 0–0.2)
PLATELET # BLD AUTO: 82 10*3/MM3 (ref 140–450)
PMV BLD AUTO: 10.2 FL (ref 6–12)
RBC # BLD AUTO: 3.61 10*6/MM3 (ref 3.77–5.28)
TIBC SERPL-MCNC: 474 MCG/DL (ref 298–536)
TRANSFERRIN SERPL-MCNC: 318 MG/DL (ref 200–360)
WBC # BLD AUTO: 3.7 10*3/MM3 (ref 3.4–10.8)

## 2021-03-12 PROCEDURE — 84466 ASSAY OF TRANSFERRIN: CPT | Performed by: INTERNAL MEDICINE

## 2021-03-12 PROCEDURE — G0463 HOSPITAL OUTPT CLINIC VISIT: HCPCS

## 2021-03-12 PROCEDURE — 77063 BREAST TOMOSYNTHESIS BI: CPT | Performed by: RADIOLOGY

## 2021-03-12 PROCEDURE — 85025 COMPLETE CBC W/AUTO DIFF WBC: CPT | Performed by: INTERNAL MEDICINE

## 2021-03-12 PROCEDURE — 36415 COLL VENOUS BLD VENIPUNCTURE: CPT

## 2021-03-12 PROCEDURE — 82728 ASSAY OF FERRITIN: CPT | Performed by: INTERNAL MEDICINE

## 2021-03-12 PROCEDURE — 77067 SCR MAMMO BI INCL CAD: CPT | Performed by: RADIOLOGY

## 2021-03-12 PROCEDURE — 83540 ASSAY OF IRON: CPT | Performed by: INTERNAL MEDICINE

## 2021-03-12 NOTE — NURSING NOTE
Lab Results   Component Value Date    WBC 3.70 03/12/2021    HGB 11.0 (L) 03/12/2021    HCT 31.8 (L) 03/12/2021    MCV 88.1 03/12/2021    PLT 82 (L) 03/12/2021     Lab Results   Component Value Date    FERRITIN 107.40 03/12/2021     Lab Results   Component Value Date    IRON 93 03/12/2021    TIBC 474 03/12/2021    FERRITIN 107.40 03/12/2021     Patient here today for lab review. Patient with no complaints or concerns at this time. Patient states her only issue is nausea, which she has a follow up next week with her gastroenterologist. Reviewed CBC with patient, all questions answered. Patient did not wish to wait for ferritin/iron levels, requesting a phone call. Follow up appointments reviewed with patient. Pt instructed to contact office for any questions or concerns, understanding verbalized.     1257: Iron studies reviewed, all within normal range. Results called to patient, all questions answered.

## 2021-03-16 ENCOUNTER — TELEPHONE (OUTPATIENT)
Dept: INTERNAL MEDICINE | Age: 61
End: 2021-03-16

## 2021-03-16 NOTE — TELEPHONE ENCOUNTER
PATIENT STATES THAT SHE HAD BLOOD IN HER URINE AND SHE WAS ADVISED TO SEE A KIDNEY SPECIALIST AND WOULD LIKE A RECOMMENDATION    PLEASE ADVISE    PATIENT CAN BE REACHED AT  [phone]

## 2021-03-16 NOTE — TELEPHONE ENCOUNTER
Pt was contacted r/t referral. Pt had UA done at [facility]. Record request done, spoke karla Avalos in medical records.  Pt went on to say she is very tired and has general concerns. Pt wanted to see provider. Appt made. KHOA

## 2021-03-17 ENCOUNTER — OFFICE VISIT (OUTPATIENT)
Dept: GASTROENTEROLOGY | Facility: CLINIC | Age: 61
End: 2021-03-17

## 2021-03-17 VITALS — HEIGHT: 67 IN | TEMPERATURE: 98 F | BODY MASS INDEX: 28.82 KG/M2 | WEIGHT: 183.6 LBS

## 2021-03-17 DIAGNOSIS — K21.9 GASTROESOPHAGEAL REFLUX DISEASE, UNSPECIFIED WHETHER ESOPHAGITIS PRESENT: ICD-10-CM

## 2021-03-17 DIAGNOSIS — L29.9 PRURITUS: ICD-10-CM

## 2021-03-17 DIAGNOSIS — R13.10 DYSPHAGIA, UNSPECIFIED TYPE: ICD-10-CM

## 2021-03-17 DIAGNOSIS — N63.10 BREAST MASS, RIGHT: ICD-10-CM

## 2021-03-17 DIAGNOSIS — K74.69 OTHER CIRRHOSIS OF LIVER (HCC): Primary | ICD-10-CM

## 2021-03-17 DIAGNOSIS — N63.10 BREAST MASS, RIGHT: Primary | ICD-10-CM

## 2021-03-17 DIAGNOSIS — K31.84 GASTROPARESIS: ICD-10-CM

## 2021-03-17 PROCEDURE — 99214 OFFICE O/P EST MOD 30 MIN: CPT | Performed by: INTERNAL MEDICINE

## 2021-03-17 RX ORDER — LUBIPROSTONE 8 UG/1
8 CAPSULE ORAL 2 TIMES DAILY WITH MEALS
Qty: 60 CAPSULE | Refills: 3 | Status: SHIPPED | OUTPATIENT
Start: 2021-03-17 | End: 2021-03-26 | Stop reason: SDUPTHER

## 2021-03-17 RX ORDER — ERGOCALCIFEROL 1.25 MG/1
CAPSULE ORAL
COMMUNITY
End: 2021-09-20

## 2021-03-17 RX ORDER — METOCLOPRAMIDE 10 MG/1
10 TABLET ORAL
Qty: 120 TABLET | Refills: 1 | Status: SHIPPED | OUTPATIENT
Start: 2021-03-17

## 2021-03-17 RX ORDER — HYDROXYZINE HYDROCHLORIDE 25 MG/1
50 TABLET, FILM COATED ORAL NIGHTLY
Qty: 60 TABLET | Refills: 3 | Status: SHIPPED | OUTPATIENT
Start: 2021-03-17

## 2021-03-17 NOTE — PROGRESS NOTES
Subjective   Chief Complaint   Patient presents with   • Cirrhosis   • Hospital Follow Up Visit       Silvia Zabala is a  60 y.o. female here for a follow up visit for cirrhosis.     She was last seen in the office in December 2020.  She was recently hospitalized in February.  She was seen in the hospital for dysphagia and hepatic encephalopathy.  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  She is also had a Doppler study of her TIPS shunt which showed patency.    She complains of n/v/constipation.  She has struggled with poor po intake since d/c from the hospitalization.  She has tried miralax and ex lax for her constipation. She takes reglan qid.  She is able to tolerate liquids and boost.  She reports that her heartburn is worse than usual.  She still having some trouble swallowing.  She reports that sometimes she is feels like she gets choked and she has to bend over and at all rushes out.  She did have an esophagram during her hospitalization that showed a tiny anterior cervical web.  The barium tablet did slow in that area but was not obstructed.    She has tried lactulose before due to cramping.        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   • Cirrhosis (CMS/HCC)    • Clostridium difficile infection    • Colon polyps    • Depression    • Diabetes mellitus (CMS/HCC)     Adult onset, insulin requiring   • Duodenal ulcer with hemorrhage    • Esophageal varices determined by endoscopy (CMS/HCC)    • Fibromyalgia    • Gallbladder disease    • Gastric varices    • Gastroparesis    • Glaucoma    • Granuloma annulare    • H/O B12 deficiency    • H/O Bleeding ulcer     And gastroesophageal varices   • H/O mixed connective tissue disorder     • Hemorrhoids    • Hiatal hernia    • History of transfusion     needs bendryl  flushes sensation and temp goes up   • Hx of being hospitalized     In [place] for GI bleeding from ulcer and varices   • Hyperlipidemia    • Migraine    • Mitral valve prolapse    • DUKES (nonalcoholic steatohepatitis) 11/2016   • Orthostatic hypotension 02/2019   • BRICE (obstructive sleep apnea)    • Pancreas divisum    • Pancytopenia (CMS/HCC)     Chronic, due to cirrhosis and hypersplenism   • Peptic ulcer disease     And esophageal varices   • PONV (postoperative nausea and vomiting)    • RA (rheumatoid arthritis) (CMS/HCC)    • Seizure disorder (CMS/HCC)     last 2003   • Seizures (CMS/HCC)    • Systemic lupus (CMS/HCC)    • TIA (transient ischemic attack) 1984     Past Surgical History:   Procedure Laterality Date   • BARTHOLIN CYST MARSUPIALIZATION Left 1/18/2021    Procedure: EXCISION OF LABIAL CYST;  Surgeon: Melinda Thakkar MD;  Location: [facility] MAIN OR;  Service: Obstetrics/Gynecology;  Laterality: Left;   • BLADDER REPAIR  1991   • CATARACT EXTRACTION     • CHOLECYSTECTOMY  1994   • COLONOSCOPY     • ENDOSCOPY N/A 11/7/2016    Procedure: ESOPHAGOGASTRODUODENOSCOPY WITH COLD POLYPECTOMY, BANDING,  AND CLIPS TIMES 2;  Surgeon: Rich Coleman MD;  Location: [facility] ENDOSCOPY;  Service:    • ENDOSCOPY N/A 12/22/2016    Procedure: ESOPHAGOGASTRODUODENOSCOPY WITH ESOPHAGEAL BANDING. 5 BANDS FIRED, 4 BANDS ADHERERD TO MUCOSA;  Surgeon: Rich Coleman MD;  Location: [facility] ENDOSCOPY;  Service:    • ENDOSCOPY N/A 2/15/2017    Procedure: ESOPHAGOGASTRODUODENOSCOPY AT BEDSIDE with esophageal varices banding (3);  Surgeon: Rich Coleman MD;  Location: [facility] ENDOSCOPY;  Service:    • ENDOSCOPY N/A 4/6/2017    Procedure: ESOPHAGOGASTRODUODENOSCOPY WITH ESOPHAGEAL VARICES BANDING x2;  Surgeon: Rich Coleman MD;  Location: [facility] ENDOSCOPY;  Service:    • ENDOSCOPY N/A 11/24/2017    Procedure: ESOPHAGOGASTRODUODENOSCOPY with  "biopsies;  Surgeon: Rich Coleman MD;  Location: [facility] ENDOSCOPY;  Service:    • ENDOSCOPY N/A 10/5/2018    Procedure: ESOPHAGOGASTRODUODENOSCOPY;  Surgeon: Rich Coleman MD;  Location: [facility] ENDOSCOPY;  Service: Gastroenterology   • ENDOSCOPY N/A 5/10/2019    Procedure: ESOPHAGOGASTRODUODENOSCOPY AT BEDSIDE WITH VARICEAL LIGATION;  Surgeon: Rich Coleman MD;  Location[facility] ENDOSCOPY;  Service: Gastroenterology   • ENDOSCOPY N/A 6/28/2019    Procedure: ESOPHAGOGASTRODUODENOSCOPY WITH ESOPHAGEAL BANDING (3 BANDS);  Surgeon: Rich Coleman MD;  Location: [facility] ENDOSCOPY;  Service: Gastroenterology   • ENDOSCOPY AND COLONOSCOPY  2014    Dr. Rich Coleman with findings of candida esophagitis   • EYE SURGERY     • HYSTERECTOMY  1986    partial   • JOINT REPLACEMENT     • KNEE SURGERY Right 1995    \"right knee recontstruction\"   • LIVER BIOPSY  01/2015   • MASS EXCISION     • STOMACH SURGERY     • TIPS PROCEDURE  2020    x2       Current Outpatient Medications:   •  ALPRAZolam (XANAX) 0.5 MG tablet, Take 1 tablet by mouth 2 (Two) Times a Day As Needed for Anxiety or Sleep., Disp: 60 tablet, Rfl: 0  •  Cholecalciferol (Vitamin D3) 25 MCG (1000 UT) capsule, Take 1,000 Units by mouth Daily., Disp: , Rfl:   •  cholestyramine light 4 g powder, Take 1 packet by mouth 2 (Two) Times a Day. (Patient taking differently: Take 8 g by mouth 2 (Two) Times a Day.), Disp: 60 packet, Rfl: 5  •  Continuous Blood Gluc Sensor (FreeStyle Roseline Sensor System), 1 Device Every 14 (Fourteen) Days., Disp: 2 each, Rfl: 3  •  Docusate Sodium (COLACE PO), Take 2 capsules by mouth every night at bedtime., Disp: , Rfl:   •  DULoxetine (CYMBALTA) 30 MG capsule, Take 1 capsule by mouth Daily., Disp: 30 capsule, Rfl: 0  •  ergocalciferol (ERGOCALCIFEROL) 1.25 MG (45865 UT) capsule, Vitamin D2 1,250 mcg (50,000 unit) capsule, Disp: , Rfl:   •  esomeprazole (nexIUM) 40 MG capsule, Take 40 mg by mouth Every Morning Before Breakfast., " Disp: , Rfl:   •  ferrous sulfate 325 (65 FE) MG tablet, TAKE ONE TABLET BY MOUTH TWICE A DAY (Patient taking differently: Take 325 mg by mouth 2 (two) times a day.), Disp: 60 tablet, Rfl: 2  •  furosemide (LASIX) 80 MG tablet, Take 80 mg by mouth Daily As Needed (swelling)., Disp: , Rfl:   •  glucose blood test strip, by Other route As Needed., Disp: , Rfl:   •  hydrOXYzine (ATARAX) 25 MG tablet, Take 2 tablets by mouth Every Night., Disp: 60 tablet, Rfl: 3  •  Insulin Glargine (LANTUS SOLOSTAR) 100 UNIT/ML injection pen, Inject 70 units in the am and 70 units in the pm (Patient taking differently: Inject 80 Units under the skin into the appropriate area as directed 2 (Two) Times a Day.), Disp: 20 pen, Rfl: 1  •  Insulin Lispro, 1 Unit Dial, (HumaLOG KwikPen) 100 UNIT/ML solution pen-injector, INJECT UP TO 60 UNITS THREE TIMES A DAY WITH  MEALS, Disp: 30 mL, Rfl: 0  •  levETIRAcetam (KEPPRA) 500 MG tablet, TAKE ONE TABLET BY MOUTH TWICE A DAY, Disp: 180 tablet, Rfl: 3  •  Magnesium Oxide 400 MG capsule, Take 400 mg by mouth Every Night., Disp: 90 each, Rfl: 1  •  metoclopramide (REGLAN) 10 MG tablet, Take 1 tablet by mouth 4 (Four) Times a Day Before Meals & at Bedtime., Disp: 120 tablet, Rfl: 1  •  mirtazapine (REMERON) 15 MG tablet, Take 15 mg by mouth Every Night., Disp: , Rfl:   •  Multiple Vitamins-Minerals (MULTIVITAMIN WITH MINERALS) tablet tablet, Take 1 tablet by mouth Daily., Disp: , Rfl:   •  OxyCODONE HCl (OXAYDO) 7.5 MG tablet , Take 7.5 mg by mouth Every 8 (Eight) Hours As Needed., Disp: , Rfl:   •  polyethylene glycol (MIRALAX) powder, Take 17 g by mouth Daily As Needed., Disp: , Rfl:   •  Prasterone (Intrarosa) 6.5 MG insert, Insert 1 each into the vagina 2 (Two) Times a Week., Disp: , Rfl:   •  pregabalin (LYRICA) 75 MG capsule, Take 75 mg by mouth 2 (Two) Times a Day., Disp: , Rfl:   •  spironolactone (ALDACTONE) 100 MG tablet, TAKE ONE TABLET BY MOUTH DAILY (Patient taking differently: Take 100  "mg by mouth Daily As Needed (swelling).), Disp: 30 tablet, Rfl: 2  •  sucralfate (CARAFATE) 1 GM/10ML suspension, Take 1 g by mouth 3 (Three) Times a Day., Disp: , Rfl:   •  vitamin B-12 (CYANOCOBALAMIN) 1000 MCG tablet, Take 1,000 mcg by mouth Daily., Disp: , Rfl:   •  XIFAXAN 550 MG tablet, TAKE ONE TABLET BY MOUTH TWICE A DAY (Patient taking differently: Take 550 mg by mouth Every 12 (Twelve) Hours.), Disp: 60 tablet, Rfl: 2  •  lubiprostone (Amitiza) 8 MCG capsule, Take 1 capsule by mouth 2 (Two) Times a Day With Meals., Disp: 60 capsule, Rfl: 3  PRN Meds:.  Allergies   Allergen Reactions   • Albuterol Anaphylaxis   • Imitrex [Sumatriptan] Anaphylaxis   • Tramadol Nausea Only, Other (See Comments) and GI Intolerance     Per pt causes her \"palsy, meaning shaking and tremors\"    • Nsaids Other (See Comments)     Told by MD not to take due to liver problems   • Tylenol [Acetaminophen] Other (See Comments)     Has liver problems and told by MD to not take   • Zofran [Ondansetron Hcl] Other (See Comments)     Pt states does not work to correct nausea and vomiting.    • Lactulose Nausea And Vomiting and Other (See Comments)     Severe abdominal pain   • Quinine Derivatives Nausea And Vomiting     Social History     Socioeconomic History   • Marital status:      Spouse name: Jose   • Number of children: Not on file   • Years of education: Not on file   • Highest education level: Not on file   Tobacco Use   • Smoking status: Former Smoker     Packs/day: 1.00     Years: 4.00     Pack years: 4.00     Types: Cigarettes     Quit date: 2015     Years since quittin.2   • Smokeless tobacco: Never Used   Vaping Use   • Vaping Use: Never used   Substance and Sexual Activity   • Alcohol use: No   • Drug use: Never   • Sexual activity: Defer     Family History   Problem Relation Age of Onset   • Liver disease Other    • Depression Other    • Heart disease Other    • Hypertension Other    • Diabetes Other    • " Breast cancer Other    • Brain cancer Other    • Uterine cancer Other    • Colon cancer Other    • Leukemia Other    • Colon cancer Maternal Aunt    • Hypertension Mother    • Diabetes type II Mother    • Rheum arthritis Mother    • Liver disease Mother         DUKES   • Heart disease Father    • Diabetes type II Father    • Diabetes type II Sister    • Lupus Sister    • Malig Hyperthermia Neg Hx      Review of Systems   Constitutional: Positive for fatigue. Negative for appetite change and unexpected weight change.   Gastrointestinal: Positive for abdominal pain, constipation, nausea and vomiting. Negative for diarrhea.   Musculoskeletal: Positive for myalgias.     Vitals:    03/17/21 1312   Temp: 98 °F (36.7 °C)         03/17/21  1312   Weight: 83.3 kg (183 lb 9.6 oz)       Objective   Physical Exam  Constitutional:       Appearance: Normal appearance.   Pulmonary:      Effort: Pulmonary effort is normal.      Breath sounds: Normal breath sounds.   Abdominal:      General: There is no distension.   Neurological:      Mental Status: She is alert.       No radiology results for the last 7 days    Assessment/Plan   Diagnoses and all orders for this visit:    Other cirrhosis of liver (CMS/HCC)  -     AFP Tumor Marker  -     Comprehensive Metabolic Panel    Gastroparesis    Gastroesophageal reflux disease, unspecified whether esophagitis present    Dysphagia, unspecified type    Pruritus    Other orders  -     ergocalciferol (ERGOCALCIFEROL) 1.25 MG (36272 UT) capsule; Vitamin D2 1,250 mcg (50,000 unit) capsule  -     lubiprostone (Amitiza) 8 MCG capsule; Take 1 capsule by mouth 2 (Two) Times a Day With Meals.  -     hydrOXYzine (ATARAX) 25 MG tablet; Take 2 tablets by mouth Every Night.  -     metoclopramide (REGLAN) 10 MG tablet; Take 1 tablet by mouth 4 (Four) Times a Day Before Meals & at Bedtime.      Plan:  · Decrease use of cholestyramine - this is likely contributing to her constipation  · Take 1/4 bottle mag  citrate tonight + enema, repeat tomorrow if needed  · Trial amitiza for chronic constipation  · Increase reglan dose to qid for next few weeks - can decrease in a few weeks if improving  · Low residue diet - liquids until symptoms improve  · Will see how her swallowing progresses-she had a tiny web.  I cannot imagine that this is actually causing the symptoms she describes.  It sounds like dysmotility due to more than anything.  I hope that with improvement in her other symptoms, i.e. gastroparesis and GERD that this will improve somewhat.  We will revisit at her follow-up

## 2021-03-17 NOTE — PATIENT INSTRUCTIONS
· Decrease use of cholestyramine - this is likely contributing to her constipation  · Take 1/4 bottle mag citrate tonight + enema, repeat tomorrow if needed  · Trial amitiza for chronic constipation  · Increase reglan dose to qid for next few weeks - can decrease in a few weeks if improving  · Low residue diet - liquids until symptoms improve

## 2021-03-18 ENCOUNTER — OFFICE VISIT (OUTPATIENT)
Dept: INTERNAL MEDICINE | Age: 61
End: 2021-03-18

## 2021-03-18 ENCOUNTER — READMISSION MANAGEMENT (OUTPATIENT)
Dept: CALL CENTER | Facility: HOSPITAL | Age: 61
End: 2021-03-18

## 2021-03-18 VITALS
SYSTOLIC BLOOD PRESSURE: 130 MMHG | BODY MASS INDEX: 28.79 KG/M2 | HEIGHT: 67 IN | DIASTOLIC BLOOD PRESSURE: 60 MMHG | TEMPERATURE: 97.1 F | OXYGEN SATURATION: 99 % | WEIGHT: 183.4 LBS | HEART RATE: 94 BPM

## 2021-03-18 DIAGNOSIS — R31.9 HEMATURIA, UNSPECIFIED TYPE: ICD-10-CM

## 2021-03-18 DIAGNOSIS — R39.89 ABNORMAL URINE COLOR: Primary | ICD-10-CM

## 2021-03-18 LAB
AFP-TM SERPL-MCNC: 1.8 NG/ML (ref 0–8.3)
ALBUMIN SERPL-MCNC: 3.7 G/DL (ref 3.5–5.2)
ALBUMIN/GLOB SERPL: 1.5 G/DL
ALP SERPL-CCNC: 159 U/L (ref 39–117)
ALT SERPL-CCNC: 24 U/L (ref 1–33)
AST SERPL-CCNC: 41 U/L (ref 1–32)
BILIRUB BLD-MCNC: NEGATIVE MG/DL
BILIRUB SERPL-MCNC: 1.5 MG/DL (ref 0–1.2)
BUN SERPL-MCNC: 10 MG/DL (ref 6–20)
BUN/CREAT SERPL: 12 (ref 7–25)
CALCIUM SERPL-MCNC: 8.8 MG/DL (ref 8.6–10.5)
CHLORIDE SERPL-SCNC: 100 MMOL/L (ref 98–107)
CLARITY, POC: CLEAR
CO2 SERPL-SCNC: 23.5 MMOL/L (ref 22–29)
COLOR UR: ABNORMAL
CREAT SERPL-MCNC: 0.83 MG/DL (ref 0.57–1)
GLOBULIN SER CALC-MCNC: 2.5 GM/DL
GLUCOSE SERPL-MCNC: 351 MG/DL (ref 65–99)
GLUCOSE UR STRIP-MCNC: NEGATIVE MG/DL
KETONES UR QL: NEGATIVE
LEUKOCYTE EST, POC: ABNORMAL
NITRITE UR-MCNC: NEGATIVE MG/ML
PH UR: 6.5 [PH] (ref 5–8)
POTASSIUM SERPL-SCNC: 4 MMOL/L (ref 3.5–5.2)
PROT SERPL-MCNC: 6.2 G/DL (ref 6–8.5)
PROT UR STRIP-MCNC: NEGATIVE MG/DL
RBC # UR STRIP: NEGATIVE /UL
SODIUM SERPL-SCNC: 135 MMOL/L (ref 136–145)
SP GR UR: 1.02 (ref 1–1.03)
UROBILINOGEN UR QL: NORMAL

## 2021-03-18 PROCEDURE — 81003 URINALYSIS AUTO W/O SCOPE: CPT | Performed by: NURSE PRACTITIONER

## 2021-03-18 PROCEDURE — 99213 OFFICE O/P EST LOW 20 MIN: CPT | Performed by: NURSE PRACTITIONER

## 2021-03-18 RX ORDER — CHOLESTYRAMINE 4 G/9G
POWDER, FOR SUSPENSION ORAL
COMMUNITY
Start: 2020-05-20 | End: 2021-03-18 | Stop reason: SDUPTHER

## 2021-03-18 NOTE — PROGRESS NOTES
"    I N T E R N A L  M E D I C I N E  SAGE BENSON, JOEL      ENCOUNTER DATE:  03/18/2021    Silvia Zabala / 60 y.o. / female      CHIEF COMPLAINT / REASON FOR OFFICE VISIT     Blood in Urine (urine sample from GYN (in chart), referral to Urology) and Fatigue      ASSESSMENT & PLAN     1. Abnormal urine color  - POCT urinalysis dipstick, automated  - Urinalysis With Microscopic If Indicated (No Culture) - Urine, Clean Catch  - Ambulatory Referral to Urology    2. Hematuria, unspecified type  - Urinalysis With Microscopic If Indicated (No Culture) - Urine, Clean Catch  - Ambulatory Referral to Urology    Orders Placed This Encounter   Procedures   • Urinalysis With Microscopic If Indicated (No Culture) - Urine, Clean Catch   • Ambulatory Referral to Urology   • POCT urinalysis dipstick, automated     No orders of the defined types were placed in this encounter.      SUMMARY/DISCUSSION  • We will send out for urinalysis from urine dipstick obtained today.  • Referral to urology for hematuria seen [facility], patient complains that she may not be voiding completely.  • Patient instructed if urine decreases or pain develop she is to present to the emergency room.       Next Appointment with me: 4/30/2021    No follow-ups on file.      VITAL SIGNS     Visit Vitals  /60   Pulse 94   Temp 97.1 °F (36.2 °C) (Temporal)   Ht 170.2 cm (67\")   Wt 83.2 kg (183 lb 6.4 oz)   LMP  (LMP Unknown)   SpO2 99%   BMI 28.72 kg/m²       Wt Readings from Last 3 Encounters:   03/18/21 83.2 kg (183 lb 6.4 oz)   03/17/21 83.3 kg (183 lb 9.6 oz)   03/12/21 81.9 kg (180 lb 9.6 oz)     Body mass index is 28.72 kg/m².      MEDICATIONS AT THE TIME OF OFFICE VISIT     Current Outpatient Medications on File Prior to Visit   Medication Sig   • ALPRAZolam (XANAX) 0.5 MG tablet Take 1 tablet by mouth 2 (Two) Times a Day As Needed for Anxiety or Sleep.   • Cholecalciferol (Vitamin D3) 25 MCG (1000 UT) capsule Take 1,000 Units by " mouth Daily.   • cholestyramine light 4 g powder Take 1 packet by mouth 2 (Two) Times a Day. (Patient taking differently: Take 8 g by mouth 2 (Two) Times a Day.)   • Continuous Blood Gluc Sensor (FreeStyle Roseline Sensor System) 1 Device Every 14 (Fourteen) Days.   • Docusate Sodium (COLACE PO) Take 2 capsules by mouth every night at bedtime.   • ergocalciferol (ERGOCALCIFEROL) 1.25 MG (10253 UT) capsule Vitamin D2 1,250 mcg (50,000 unit) capsule   • esomeprazole (nexIUM) 40 MG capsule Take 40 mg by mouth Every Morning Before Breakfast.   • ferrous sulfate 325 (65 FE) MG tablet TAKE ONE TABLET BY MOUTH TWICE A DAY (Patient taking differently: Take 325 mg by mouth 2 (two) times a day.)   • furosemide (LASIX) 80 MG tablet Take 80 mg by mouth Daily As Needed (swelling).   • glucose blood test strip by Other route As Needed.   • hydrOXYzine (ATARAX) 25 MG tablet Take 2 tablets by mouth Every Night.   • Insulin Glargine (LANTUS SOLOSTAR) 100 UNIT/ML injection pen Inject 70 units in the am and 70 units in the pm (Patient taking differently: Inject 80 Units under the skin into the appropriate area as directed 2 (Two) Times a Day.)   • Insulin Lispro, 1 Unit Dial, (HumaLOG KwikPen) 100 UNIT/ML solution pen-injector INJECT UP TO 60 UNITS THREE TIMES A DAY WITH  MEALS   • levETIRAcetam (KEPPRA) 500 MG tablet TAKE ONE TABLET BY MOUTH TWICE A DAY   • Magnesium Oxide 400 MG capsule Take 400 mg by mouth Every Night.   • metoclopramide (REGLAN) 10 MG tablet Take 1 tablet by mouth 4 (Four) Times a Day Before Meals & at Bedtime.   • mirtazapine (REMERON) 15 MG tablet Take 15 mg by mouth Every Night.   • Multiple Vitamins-Minerals (MULTIVITAMIN WITH MINERALS) tablet tablet Take 1 tablet by mouth Daily.   • OxyCODONE HCl (OXAYDO) 7.5 MG tablet  Take 7.5 mg by mouth Every 8 (Eight) Hours As Needed.   • polyethylene glycol (MIRALAX) powder Take 17 g by mouth Daily As Needed.   • Prasterone (Intrarosa) 6.5 MG insert Insert 1 each into the  vagina 2 (Two) Times a Week.   • pregabalin (LYRICA) 75 MG capsule Take 75 mg by mouth 2 (Two) Times a Day.   • spironolactone (ALDACTONE) 100 MG tablet TAKE ONE TABLET BY MOUTH DAILY (Patient taking differently: Take 100 mg by mouth Daily As Needed (swelling).)   • sucralfate (CARAFATE) 1 GM/10ML suspension Take 1 g by mouth 3 (Three) Times a Day.   • vitamin B-12 (CYANOCOBALAMIN) 1000 MCG tablet Take 1,000 mcg by mouth Daily.   • XIFAXAN 550 MG tablet TAKE ONE TABLET BY MOUTH TWICE A DAY (Patient taking differently: Take 550 mg by mouth Every 12 (Twelve) Hours.)   • DULoxetine (CYMBALTA) 30 MG capsule Take 1 capsule by mouth Daily.   • lubiprostone (Amitiza) 8 MCG capsule Take 1 capsule by mouth 2 (Two) Times a Day With Meals.     No current facility-administered medications on file prior to visit.         HISTORY OF PRESENT ILLNESS     Patient presents stating that she needs referral to urology after [facility] and finding hematuria in urine.  He did do urinalysis today and no blood was found in urine, only trace leukocytes.  She does feel like she is not emptying her bladder completely but states she is urinating 4-5 times a day a day.  Urine is also dark in color, the patient also has chronic cirrhosis.  No dysuria or urinary frequency.  She is distended in her abdomen which is her baseline with current ascites.  No fever, chills or back pain.  [facility] culture came back as no growth.    Brief Urine Lab Results  (Last result in the past 365 days)      Color   Clarity   Blood   Leuk Est   Nitrite   Protein   CREAT   Urine HCG        03/18/21 1351 Sherie Clear Negative Trace Negative Negative             REVIEW OF SYSTEMS     Constitutional neg except per HPI   Resp neg  CV neg   dark urine, feels as if she is not emptying fully     PHYSICAL EXAMINATION     Physical Exam  Constitutional  No distress  Cardiovascular Rate  normal . Rhythm: regular . Heart sounds:   normal  Pulmonary/Chest  Effort normal. Breath sounds:  normal  GI distention, tenderness to abd at baseline   Musc neg CVA tenderness   Psychiatric  Alert. Judgment and thought content normal. Mood normal       REVIEWED DATA     Labs:   Lab Results   Component Value Date    GLUCOSE 199 (H) 02/05/2021    BUN 10 03/17/2021    CREATININE 0.83 03/17/2021    EGFRIFNONA 71 03/17/2021    EGFRIFAFRI 86 03/17/2021    BCR 12.0 03/17/2021    K 4.0 03/17/2021    CO2 23.5 03/17/2021    CALCIUM 8.8 03/17/2021    PROTENTOTREF 6.2 03/17/2021    ALBUMIN 3.70 03/17/2021    LABIL2 1.5 03/17/2021    AST 41 (H) 03/17/2021    ALT 24 03/17/2021         Imaging:           Medical Tests:             Summary of old records / correspondence / consultant report:           Request outside records:           *Examiner was wearing medical surgical mask, face shield and exam gloves during the entire duration of the visit. Patient was masked the entire time.   Minimum social distance of 6 ft maintained entire visit except if physical contact was necessary as documented.     **Dragon Disclaimer:   Much of this encounter note is an electronic transcription/translation of spoken language to printed text. The electronic translation of spoken language may permit erroneous, or at times, nonsensical words or phrases to be inadvertently transcribed. Although I have reviewed the note for such errors, some may still exist.

## 2021-03-18 NOTE — OUTREACH NOTE
Medical Week 5 Survey      Responses   [facility] patient discharged from?  [facility]   Does the patient have one of the following disease processes/diagnoses(primary or secondary)?  Other   Week 5 attempt successful?  Yes   Call start time  1256   Rescheduled  Revoked   Revoke  Decline to participate   Call end time  1256   Discharge diagnosis  Intractable N/V/D d/t acute gastritis, Acute UTI, T2DM          Princess Cid, RN

## 2021-03-23 ENCOUNTER — TELEPHONE (OUTPATIENT)
Dept: GASTROENTEROLOGY | Facility: CLINIC | Age: 61
End: 2021-03-23

## 2021-03-23 DIAGNOSIS — F41.9 ANXIETY: ICD-10-CM

## 2021-03-23 RX ORDER — ALPRAZOLAM 0.5 MG/1
0.5 TABLET ORAL 2 TIMES DAILY PRN
Qty: 60 TABLET | Refills: 0 | Status: SHIPPED | OUTPATIENT
Start: 2021-03-23 | End: 2022-02-08

## 2021-03-23 NOTE — TELEPHONE ENCOUNTER
Caller: Silvia Zabala    Relationship: Self    Best call back number: [phone]    Medication needed:   Requested Prescriptions     Pending Prescriptions Disp Refills   • Insulin Glargine (LANTUS SOLOSTAR) 100 UNIT/ML injection pen 20 pen 1     Sig: Inject 70 units in the am and 70 units in the pm   • ALPRAZolam (XANAX) 0.5 MG tablet 60 tablet 0     Sig: Take 1 tablet by mouth 2 (Two) Times a Day As Needed for Anxiety or Sleep.       When do you need the refill by: ASAP    Does the patient have less than a 3 day supply:  [x] Yes  [] No    What is the patient's preferred pharmacy: [facility] [address] [facility] [phone] [facility] [phone] FX

## 2021-03-24 ENCOUNTER — BULK ORDERING (OUTPATIENT)
Dept: CASE MANAGEMENT | Facility: OTHER | Age: 61
End: 2021-03-24

## 2021-03-24 DIAGNOSIS — Z23 IMMUNIZATION DUE: ICD-10-CM

## 2021-03-25 ENCOUNTER — TELEPHONE (OUTPATIENT)
Dept: GASTROENTEROLOGY | Facility: CLINIC | Age: 61
End: 2021-03-25

## 2021-03-25 NOTE — TELEPHONE ENCOUNTER
----- Message from Javier Velasquez sent at 3/25/2021  3:12 PM EDT -----  Regarding: refill  Contact: [phone]  Patient left voice mail stated she's needs XIFAXAN,amitiza, and Nexium    [facility] [phone]

## 2021-03-26 RX ORDER — LUBIPROSTONE 8 UG/1
8 CAPSULE ORAL 2 TIMES DAILY WITH MEALS
Qty: 180 CAPSULE | Refills: 1 | Status: SHIPPED | OUTPATIENT
Start: 2021-03-26 | End: 2021-09-20

## 2021-03-26 RX ORDER — ESOMEPRAZOLE MAGNESIUM 40 MG/1
40 CAPSULE, DELAYED RELEASE ORAL
Qty: 90 CAPSULE | Refills: 1 | Status: SHIPPED | OUTPATIENT
Start: 2021-03-26 | End: 2021-09-23

## 2021-03-30 ENCOUNTER — TELEPHONE (OUTPATIENT)
Dept: INTERNAL MEDICINE | Age: 61
End: 2021-03-30

## 2021-03-30 ENCOUNTER — TELEPHONE (OUTPATIENT)
Dept: GASTROENTEROLOGY | Facility: CLINIC | Age: 61
End: 2021-03-30

## 2021-03-30 NOTE — TELEPHONE ENCOUNTER
PATIENT CALLED IN STATING THE PHARMACY SAID DUE TO THE WAY THE RX LANTUS SOLOSTAR WAS WRITTEN THE INSURANCE WILL NOT COVER IT.    PLEASE CALL BACK TO [facility].    BEST CALL BACK # [phone]

## 2021-03-30 NOTE — TELEPHONE ENCOUNTER
Called pt and left vm advised that we have sent her pa to her insurance company and are waiting to hear from them. Advised will send message to Lisa POE who is working on pa to check status.  Also advised we will have a box of amitiza 8mcg at the  for her. ADvised to call with questions.

## 2021-03-31 NOTE — TELEPHONE ENCOUNTER
Call to pt.  Advise amitiza has been denied.  Dr Gray otu of office.  Pt states has amitiza on hand - will await Dr Gray's return for instructions.     [facility] needs refill of xifaxan.  Advise filled on 3/26 @ 10:22 am with 180, R1 - receipt confirmed.  Verb understanding.  Will call pharmacy.

## 2021-04-01 ENCOUNTER — TELEPHONE (OUTPATIENT)
Dept: GASTROENTEROLOGY | Facility: CLINIC | Age: 61
End: 2021-04-01

## 2021-04-01 ENCOUNTER — HOSPITAL ENCOUNTER (INPATIENT)
Facility: HOSPITAL | Age: 61
LOS: 5 days | Discharge: HOME | End: 2021-04-06
Attending: EMERGENCY MEDICINE | Admitting: INTERNAL MEDICINE

## 2021-04-01 ENCOUNTER — APPOINTMENT (OUTPATIENT)
Dept: GENERAL RADIOLOGY | Facility: HOSPITAL | Age: 61
End: 2021-04-01

## 2021-04-01 DIAGNOSIS — D69.6 THROMBOCYTOPENIA (HCC): ICD-10-CM

## 2021-04-01 DIAGNOSIS — K31.84 GASTROPARESIS: ICD-10-CM

## 2021-04-01 DIAGNOSIS — R11.2 NAUSEA & VOMITING: ICD-10-CM

## 2021-04-01 DIAGNOSIS — K74.60 LIVER CIRRHOSIS SECONDARY TO NASH (HCC): ICD-10-CM

## 2021-04-01 DIAGNOSIS — K75.81 LIVER CIRRHOSIS SECONDARY TO NASH (HCC): ICD-10-CM

## 2021-04-01 DIAGNOSIS — E10.65 HYPERGLYCEMIA DUE TO TYPE 1 DIABETES MELLITUS (HCC): Primary | ICD-10-CM

## 2021-04-01 DIAGNOSIS — R11.2 NON-INTRACTABLE VOMITING WITH NAUSEA, UNSPECIFIED VOMITING TYPE: ICD-10-CM

## 2021-04-01 DIAGNOSIS — E80.6 HYPERBILIRUBINEMIA: ICD-10-CM

## 2021-04-01 LAB
ALBUMIN SERPL-MCNC: 3.9 G/DL (ref 3.5–5.2)
ALBUMIN/GLOB SERPL: 1.6 G/DL
ALP SERPL-CCNC: 180 U/L (ref 39–117)
ALT SERPL W P-5'-P-CCNC: 29 U/L (ref 1–33)
AMPHET+METHAMPHET UR QL: NEGATIVE
ANION GAP SERPL CALCULATED.3IONS-SCNC: 14.4 MMOL/L (ref 5–15)
AST SERPL-CCNC: 28 U/L (ref 1–32)
BARBITURATES UR QL SCN: NEGATIVE
BASOPHILS # BLD AUTO: 0.03 10*3/MM3 (ref 0–0.2)
BASOPHILS NFR BLD AUTO: 0.6 % (ref 0–1.5)
BENZODIAZ UR QL SCN: NEGATIVE
BILIRUB SERPL-MCNC: 2.2 MG/DL (ref 0–1.2)
BILIRUB UR QL STRIP: NEGATIVE
BUN SERPL-MCNC: 23 MG/DL (ref 6–20)
BUN/CREAT SERPL: 21.9 (ref 7–25)
CALCIUM SPEC-SCNC: 9.6 MG/DL (ref 8.6–10.5)
CANNABINOIDS SERPL QL: NEGATIVE
CHLORIDE SERPL-SCNC: 94 MMOL/L (ref 98–107)
CLARITY UR: CLEAR
CO2 SERPL-SCNC: 21.6 MMOL/L (ref 22–29)
COCAINE UR QL: NEGATIVE
COLOR UR: YELLOW
CREAT SERPL-MCNC: 1.05 MG/DL (ref 0.57–1)
D-LACTATE SERPL-SCNC: 2.7 MMOL/L (ref 0.5–2)
D-LACTATE SERPL-SCNC: 3.3 MMOL/L (ref 0.5–2)
DEPRECATED RDW RBC AUTO: 47.1 FL (ref 37–54)
EOSINOPHIL # BLD AUTO: 0.05 10*3/MM3 (ref 0–0.4)
EOSINOPHIL NFR BLD AUTO: 1.1 % (ref 0.3–6.2)
ERYTHROCYTE [DISTWIDTH] IN BLOOD BY AUTOMATED COUNT: 14.5 % (ref 12.3–15.4)
ETHANOL BLD-MCNC: <10 MG/DL (ref 0–10)
ETHANOL UR QL: <0.01 %
GFR SERPL CREATININE-BSD FRML MDRD: 54 ML/MIN/1.73
GLOBULIN UR ELPH-MCNC: 2.5 GM/DL
GLUCOSE BLDC GLUCOMTR-MCNC: 357 MG/DL (ref 70–130)
GLUCOSE BLDC GLUCOMTR-MCNC: 445 MG/DL (ref 70–130)
GLUCOSE SERPL-MCNC: 606 MG/DL (ref 65–99)
GLUCOSE UR STRIP-MCNC: ABNORMAL MG/DL
HCT VFR BLD AUTO: 39.2 % (ref 34–46.6)
HGB BLD-MCNC: 13.3 G/DL (ref 12–15.9)
HGB UR QL STRIP.AUTO: NEGATIVE
HOLD SPECIMEN: NORMAL
HOLD SPECIMEN: NORMAL
IMM GRANULOCYTES # BLD AUTO: 0.04 10*3/MM3 (ref 0–0.05)
IMM GRANULOCYTES NFR BLD AUTO: 0.8 % (ref 0–0.5)
INR PPP: 1.33 (ref 0.9–1.1)
KETONES UR QL STRIP: ABNORMAL
LEUKOCYTE ESTERASE UR QL STRIP.AUTO: NEGATIVE
LIPASE SERPL-CCNC: 11 U/L (ref 13–60)
LYMPHOCYTES # BLD AUTO: 0.59 10*3/MM3 (ref 0.7–3.1)
LYMPHOCYTES NFR BLD AUTO: 12.5 % (ref 19.6–45.3)
MAGNESIUM SERPL-MCNC: 1.7 MG/DL (ref 1.6–2.6)
MCH RBC QN AUTO: 30.4 PG (ref 26.6–33)
MCHC RBC AUTO-ENTMCNC: 33.9 G/DL (ref 31.5–35.7)
MCV RBC AUTO: 89.7 FL (ref 79–97)
METHADONE UR QL SCN: NEGATIVE
MONOCYTES # BLD AUTO: 0.3 10*3/MM3 (ref 0.1–0.9)
MONOCYTES NFR BLD AUTO: 6.4 % (ref 5–12)
NEUTROPHILS NFR BLD AUTO: 3.7 10*3/MM3 (ref 1.7–7)
NEUTROPHILS NFR BLD AUTO: 78.6 % (ref 42.7–76)
NITRITE UR QL STRIP: NEGATIVE
NRBC BLD AUTO-RTO: 0 /100 WBC (ref 0–0.2)
OPIATES UR QL: NEGATIVE
OXYCODONE UR QL SCN: POSITIVE
PH UR STRIP.AUTO: <=5 [PH] (ref 5–8)
PLATELET # BLD AUTO: 107 10*3/MM3 (ref 140–450)
PMV BLD AUTO: 10.1 FL (ref 6–12)
POTASSIUM SERPL-SCNC: 4.1 MMOL/L (ref 3.5–5.2)
PROCALCITONIN SERPL-MCNC: 0.34 NG/ML (ref 0–0.25)
PROT SERPL-MCNC: 6.4 G/DL (ref 6–8.5)
PROT UR QL STRIP: NEGATIVE
PROTHROMBIN TIME: 16.3 SECONDS (ref 11.7–14.2)
QT INTERVAL: 384 MS
RBC # BLD AUTO: 4.37 10*6/MM3 (ref 3.77–5.28)
SARS-COV-2 RNA RESP QL NAA+PROBE: NOT DETECTED
SODIUM SERPL-SCNC: 130 MMOL/L (ref 136–145)
SP GR UR STRIP: >=1.03 (ref 1–1.03)
UROBILINOGEN UR QL STRIP: ABNORMAL
WBC # BLD AUTO: 4.71 10*3/MM3 (ref 3.4–10.8)
WHOLE BLOOD HOLD SPECIMEN: NORMAL
WHOLE BLOOD HOLD SPECIMEN: NORMAL

## 2021-04-01 PROCEDURE — P9612 CATHETERIZE FOR URINE SPEC: HCPCS

## 2021-04-01 PROCEDURE — 25010000002 DIPHENHYDRAMINE PER 50 MG: Performed by: NURSE PRACTITIONER

## 2021-04-01 PROCEDURE — 63710000001 INSULIN GLARGINE PER 5 UNITS: Performed by: INTERNAL MEDICINE

## 2021-04-01 PROCEDURE — 80307 DRUG TEST PRSMV CHEM ANLYZR: CPT | Performed by: NURSE PRACTITIONER

## 2021-04-01 PROCEDURE — 87040 BLOOD CULTURE FOR BACTERIA: CPT | Performed by: NURSE PRACTITIONER

## 2021-04-01 PROCEDURE — 99285 EMERGENCY DEPT VISIT HI MDM: CPT

## 2021-04-01 PROCEDURE — 83690 ASSAY OF LIPASE: CPT | Performed by: NURSE PRACTITIONER

## 2021-04-01 PROCEDURE — 63710000001 INSULIN REGULAR HUMAN PER 5 UNITS: Performed by: NURSE PRACTITIONER

## 2021-04-01 PROCEDURE — 84145 PROCALCITONIN (PCT): CPT | Performed by: NURSE PRACTITIONER

## 2021-04-01 PROCEDURE — U0003 INFECTIOUS AGENT DETECTION BY NUCLEIC ACID (DNA OR RNA); SEVERE ACUTE RESPIRATORY SYNDROME CORONAVIRUS 2 (SARS-COV-2) (CORONAVIRUS DISEASE [COVID-19]), AMPLIFIED PROBE TECHNIQUE, MAKING USE OF HIGH THROUGHPUT TECHNOLOGIES AS DESCRIBED BY CMS-2020-01-R: HCPCS | Performed by: NURSE PRACTITIONER

## 2021-04-01 PROCEDURE — 83735 ASSAY OF MAGNESIUM: CPT | Performed by: NURSE PRACTITIONER

## 2021-04-01 PROCEDURE — 85025 COMPLETE CBC W/AUTO DIFF WBC: CPT | Performed by: NURSE PRACTITIONER

## 2021-04-01 PROCEDURE — 25010000002 CEFTRIAXONE PER 250 MG: Performed by: NURSE PRACTITIONER

## 2021-04-01 PROCEDURE — 82962 GLUCOSE BLOOD TEST: CPT

## 2021-04-01 PROCEDURE — G0378 HOSPITAL OBSERVATION PER HR: HCPCS

## 2021-04-01 PROCEDURE — 83605 ASSAY OF LACTIC ACID: CPT | Performed by: NURSE PRACTITIONER

## 2021-04-01 PROCEDURE — 36415 COLL VENOUS BLD VENIPUNCTURE: CPT | Performed by: NURSE PRACTITIONER

## 2021-04-01 PROCEDURE — 93010 ELECTROCARDIOGRAM REPORT: CPT | Performed by: INTERNAL MEDICINE

## 2021-04-01 PROCEDURE — 93005 ELECTROCARDIOGRAM TRACING: CPT | Performed by: NURSE PRACTITIONER

## 2021-04-01 PROCEDURE — U0005 INFEC AGEN DETEC AMPLI PROBE: HCPCS | Performed by: NURSE PRACTITIONER

## 2021-04-01 PROCEDURE — 80053 COMPREHEN METABOLIC PANEL: CPT | Performed by: NURSE PRACTITIONER

## 2021-04-01 PROCEDURE — 85610 PROTHROMBIN TIME: CPT | Performed by: NURSE PRACTITIONER

## 2021-04-01 PROCEDURE — 25010000002 DROPERIDOL PER 5 MG: Performed by: NURSE PRACTITIONER

## 2021-04-01 PROCEDURE — 82077 ASSAY SPEC XCP UR&BREATH IA: CPT | Performed by: NURSE PRACTITIONER

## 2021-04-01 PROCEDURE — 81003 URINALYSIS AUTO W/O SCOPE: CPT | Performed by: NURSE PRACTITIONER

## 2021-04-01 PROCEDURE — 74022 RADEX COMPL AQT ABD SERIES: CPT

## 2021-04-01 RX ORDER — ALPRAZOLAM 0.5 MG/1
0.5 TABLET ORAL 2 TIMES DAILY PRN
Status: DISCONTINUED | OUTPATIENT
Start: 2021-04-01 | End: 2021-04-06 | Stop reason: HOSPADM

## 2021-04-01 RX ORDER — HYDROXYZINE 50 MG/1
50 TABLET, FILM COATED ORAL NIGHTLY
Status: DISCONTINUED | OUTPATIENT
Start: 2021-04-01 | End: 2021-04-06 | Stop reason: HOSPADM

## 2021-04-01 RX ORDER — NITROGLYCERIN 0.4 MG/1
0.4 TABLET SUBLINGUAL
Status: DISCONTINUED | OUTPATIENT
Start: 2021-04-01 | End: 2021-04-06 | Stop reason: HOSPADM

## 2021-04-01 RX ORDER — PREGABALIN 75 MG/1
75 CAPSULE ORAL EVERY 12 HOURS SCHEDULED
Status: DISCONTINUED | OUTPATIENT
Start: 2021-04-01 | End: 2021-04-06 | Stop reason: HOSPADM

## 2021-04-01 RX ORDER — NICOTINE POLACRILEX 4 MG
15 LOZENGE BUCCAL
Status: DISCONTINUED | OUTPATIENT
Start: 2021-04-01 | End: 2021-04-06 | Stop reason: HOSPADM

## 2021-04-01 RX ORDER — SODIUM CHLORIDE 0.9 % (FLUSH) 0.9 %
10 SYRINGE (ML) INJECTION EVERY 12 HOURS SCHEDULED
Status: DISCONTINUED | OUTPATIENT
Start: 2021-04-01 | End: 2021-04-06 | Stop reason: HOSPADM

## 2021-04-01 RX ORDER — METOCLOPRAMIDE 10 MG/1
10 TABLET ORAL
Status: DISCONTINUED | OUTPATIENT
Start: 2021-04-01 | End: 2021-04-06 | Stop reason: HOSPADM

## 2021-04-01 RX ORDER — LEVETIRACETAM 500 MG/1
500 TABLET ORAL 2 TIMES DAILY
Status: DISCONTINUED | OUTPATIENT
Start: 2021-04-01 | End: 2021-04-06 | Stop reason: HOSPADM

## 2021-04-01 RX ORDER — MIRTAZAPINE 15 MG/1
15 TABLET, FILM COATED ORAL NIGHTLY
Status: DISCONTINUED | OUTPATIENT
Start: 2021-04-01 | End: 2021-04-06 | Stop reason: HOSPADM

## 2021-04-01 RX ORDER — FERROUS SULFATE 325(65) MG
325 TABLET ORAL
Status: DISCONTINUED | OUTPATIENT
Start: 2021-04-02 | End: 2021-04-06 | Stop reason: HOSPADM

## 2021-04-01 RX ORDER — SODIUM CHLORIDE 0.9 % (FLUSH) 0.9 %
10 SYRINGE (ML) INJECTION AS NEEDED
Status: DISCONTINUED | OUTPATIENT
Start: 2021-04-01 | End: 2021-04-06 | Stop reason: HOSPADM

## 2021-04-01 RX ORDER — ERGOCALCIFEROL 1.25 MG/1
50000 CAPSULE ORAL
Status: DISCONTINUED | OUTPATIENT
Start: 2021-04-02 | End: 2021-04-06 | Stop reason: HOSPADM

## 2021-04-01 RX ORDER — INSULIN GLARGINE 100 [IU]/ML
80 INJECTION, SOLUTION SUBCUTANEOUS 2 TIMES DAILY
Status: DISCONTINUED | OUTPATIENT
Start: 2021-04-01 | End: 2021-04-04

## 2021-04-01 RX ORDER — CEFTRIAXONE SODIUM 1 G/50ML
1 INJECTION, SOLUTION INTRAVENOUS ONCE
Status: COMPLETED | OUTPATIENT
Start: 2021-04-01 | End: 2021-04-01

## 2021-04-01 RX ORDER — MULTIPLE VITAMINS W/ MINERALS TAB 9MG-400MCG
1 TAB ORAL DAILY
Status: DISCONTINUED | OUTPATIENT
Start: 2021-04-02 | End: 2021-04-06 | Stop reason: HOSPADM

## 2021-04-01 RX ORDER — DOCUSATE SODIUM 100 MG/1
100 CAPSULE, LIQUID FILLED ORAL DAILY
Status: DISCONTINUED | OUTPATIENT
Start: 2021-04-02 | End: 2021-04-06 | Stop reason: HOSPADM

## 2021-04-01 RX ORDER — DROPERIDOL 2.5 MG/ML
2.5 INJECTION, SOLUTION INTRAMUSCULAR; INTRAVENOUS ONCE
Status: COMPLETED | OUTPATIENT
Start: 2021-04-01 | End: 2021-04-01

## 2021-04-01 RX ORDER — CHOLECALCIFEROL (VITAMIN D3) 125 MCG
1000 CAPSULE ORAL DAILY
Status: DISCONTINUED | OUTPATIENT
Start: 2021-04-02 | End: 2021-04-06 | Stop reason: HOSPADM

## 2021-04-01 RX ORDER — SUCRALFATE 1 G/1
1 TABLET ORAL
Status: DISCONTINUED | OUTPATIENT
Start: 2021-04-01 | End: 2021-04-06 | Stop reason: HOSPADM

## 2021-04-01 RX ORDER — INSULIN LISPRO 100 [IU]/ML
60 INJECTION, SOLUTION INTRAVENOUS; SUBCUTANEOUS
Status: DISCONTINUED | OUTPATIENT
Start: 2021-04-02 | End: 2021-04-04

## 2021-04-01 RX ORDER — SODIUM CHLORIDE 9 MG/ML
125 INJECTION, SOLUTION INTRAVENOUS CONTINUOUS
Status: DISCONTINUED | OUTPATIENT
Start: 2021-04-01 | End: 2021-04-04

## 2021-04-01 RX ORDER — PANTOPRAZOLE SODIUM 40 MG/1
40 TABLET, DELAYED RELEASE ORAL EVERY MORNING
Refills: 1 | Status: DISCONTINUED | OUTPATIENT
Start: 2021-04-02 | End: 2021-04-06 | Stop reason: HOSPADM

## 2021-04-01 RX ORDER — DIPHENHYDRAMINE HYDROCHLORIDE 50 MG/ML
12.5 INJECTION INTRAMUSCULAR; INTRAVENOUS ONCE
Status: COMPLETED | OUTPATIENT
Start: 2021-04-01 | End: 2021-04-01

## 2021-04-01 RX ORDER — DEXTROSE MONOHYDRATE 25 G/50ML
25 INJECTION, SOLUTION INTRAVENOUS
Status: DISCONTINUED | OUTPATIENT
Start: 2021-04-01 | End: 2021-04-06 | Stop reason: HOSPADM

## 2021-04-01 RX ORDER — OXYCODONE HYDROCHLORIDE 5 MG/1
7.5 TABLET ORAL EVERY 8 HOURS PRN
Status: DISCONTINUED | OUTPATIENT
Start: 2021-04-01 | End: 2021-04-06 | Stop reason: HOSPADM

## 2021-04-01 RX ORDER — INSULIN LISPRO 100 [IU]/ML
0-9 INJECTION, SOLUTION INTRAVENOUS; SUBCUTANEOUS
Status: DISCONTINUED | OUTPATIENT
Start: 2021-04-02 | End: 2021-04-06 | Stop reason: HOSPADM

## 2021-04-01 RX ORDER — CHOLESTYRAMINE LIGHT 4 G/5.7G
8 POWDER, FOR SUSPENSION ORAL 2 TIMES DAILY
Status: DISCONTINUED | OUTPATIENT
Start: 2021-04-01 | End: 2021-04-06 | Stop reason: HOSPADM

## 2021-04-01 RX ADMIN — CEFTRIAXONE SODIUM 1 G: 1 INJECTION, SOLUTION INTRAVENOUS at 18:28

## 2021-04-01 RX ADMIN — SODIUM CHLORIDE, POTASSIUM CHLORIDE, SODIUM LACTATE AND CALCIUM CHLORIDE 1000 ML: 600; 310; 30; 20 INJECTION, SOLUTION INTRAVENOUS at 17:16

## 2021-04-01 RX ADMIN — LEVETIRACETAM 500 MG: 500 TABLET, FILM COATED ORAL at 22:59

## 2021-04-01 RX ADMIN — CHOLESTYRAMINE 8 G: 4 POWDER, FOR SUSPENSION ORAL at 22:59

## 2021-04-01 RX ADMIN — RIFAXIMIN 550 MG: 550 TABLET ORAL at 22:59

## 2021-04-01 RX ADMIN — MIRTAZAPINE 15 MG: 15 TABLET, FILM COATED ORAL at 22:59

## 2021-04-01 RX ADMIN — PREGABALIN 75 MG: 75 CAPSULE ORAL at 22:59

## 2021-04-01 RX ADMIN — HYDROXYZINE HYDROCHLORIDE 50 MG: 50 TABLET ORAL at 22:58

## 2021-04-01 RX ADMIN — OXYCODONE 7.5 MG: 5 TABLET ORAL at 22:58

## 2021-04-01 RX ADMIN — INSULIN HUMAN 10 UNITS: 100 INJECTION, SOLUTION PARENTERAL at 18:32

## 2021-04-01 RX ADMIN — SODIUM CHLORIDE 125 ML/HR: 9 INJECTION, SOLUTION INTRAVENOUS at 23:03

## 2021-04-01 RX ADMIN — METOCLOPRAMIDE 10 MG: 10 TABLET ORAL at 22:59

## 2021-04-01 RX ADMIN — SODIUM CHLORIDE, PRESERVATIVE FREE 10 ML: 5 INJECTION INTRAVENOUS at 23:02

## 2021-04-01 RX ADMIN — SUCRALFATE 1 G: 1 TABLET ORAL at 22:58

## 2021-04-01 RX ADMIN — INSULIN GLARGINE 80 UNITS: 100 INJECTION, SOLUTION SUBCUTANEOUS at 23:01

## 2021-04-01 RX ADMIN — DIPHENHYDRAMINE HYDROCHLORIDE 12.5 MG: 50 INJECTION, SOLUTION INTRAMUSCULAR; INTRAVENOUS at 17:38

## 2021-04-01 RX ADMIN — SODIUM CHLORIDE, PRESERVATIVE FREE 10 ML: 5 INJECTION INTRAVENOUS at 23:00

## 2021-04-01 RX ADMIN — DROPERIDOL 2.5 MG: 2.5 INJECTION, SOLUTION INTRAMUSCULAR; INTRAVENOUS at 17:39

## 2021-04-01 NOTE — ED NOTES
Patient to triage with a written list of complaints. However, patient is mostly concerned with weakness and fatigue that has been going on for the past 3 weeks. Patient is reportedly vomiting for the past 3 weeks as well.      Lou Castro, SRIKANTH  04/01/21 [id number]

## 2021-04-01 NOTE — ED PROVIDER NOTES
"EMERGENCY DEPARTMENT ENCOUNTER    Room Number:  37/37  Date seen:  4/1/2021  Time seen: 17:03 EDT  PCP: Kulwant Martínez APRN  Historian: patient, prior records    HPI:  Chief complaint:multiple   A complete HPI/ROS/PMH/PSH/SH/FH are unobtainable due to: n/a  Context:Silvia Zabala is a 60 y.o. female who presents to the ED with a handwritten list of multiple complaints including 3 weeks of severe fatigue, nausea and vomiting, decreased appetite, headache, itching, cramping, sore throat, pain all over, fluttering in chest and hot and cold chills.  The symptoms are not made better or worse by anything and she has not seen her primary care provider about this problem.  She has a history of Wong, lupus and diabetes she tells me she was born in a \"toxic waste site\" and has been having problems ever since then.  She also mentions that she spoke to have an ultrasound of the right breast tomorrow due to a mass and that she had a primary care appointment today that she was unable to get to due to nausea and vomiting.      Patient was placed in face mask in first look. Patient was wearing facemask when I entered the room and throughout our encounter. I wore full protective equipment throughout this patient encounter including a face mask, eye shield and gloves. Hand hygiene/washing of hands was performed before donning protective equipment and after removal when leaving the room.    MEDICAL RECORD REVIEW    From recent discharge summary dated 2/7/2021.    60-year-old female, with history of anemia, anxiety, liver cirrhosis, diabetes mellitus, fibromyalgia, esophageal varices, gastroparesis, is seen in the hospital today with main complaints of nausea and vomiting.  Patient upon further evaluation in the emergency room, states that she has been unable to keep anything down.  Patient does have significant hyperglycemia in the emergency room.  She does exhibit hyperbilirubinemia and transaminitis.  Patient will be admitted to " hospitalist service for further workup of symptoms.  Upon review of urinalysis, she has findings consistent with UTI.  Patient has had recent tips procedure done, she has been doing fairly okay. Over last 2-3 days she has been feeling really sick.  She denies fever, chills.       1. Intractable nausea, vomiting, diarrhea is most likely secondary to acute gastroenteritis versus gastroparesis.    Continue with symptomatic management and patient has been improving clinically.  Underwent endoscopy in September of 2020 and GI does not see any reason to perform another endoscopy at this point.  Patient is on Reglan which will also be continued. Esophagogram was done today and there was no significant stenosis was noted.  Patient is able to tolerate p.o. diet therefore will be discharged home and will follow-up with GI as an outpatient basis.  2. History of liver cirrhosis with DUKES and esophageal varices, currently patient's mental status at baseline and she appears to be compensated from will cirrhosis standpoint.  She is not any volume overload.  Continue with Aldactone.  Patient is also on Xifaxan which will be continued.  3. Diabetes mellitus, on Lantus and corrective dose insulin which will be continued.  4. History of seizures, on Keppra and will be continued.  5. Depression, psychiatry did evaluate and citalopram has been initiated.       Please note patient was seen examined today on day of discharge.    Also noted are visits from [facility] and patient has hepatic cirrhosis and has h/o TIPS (transjugular interhepatic portosystemic shunt) procedure.  She at one point was on liver transplant list.      From another note dated 2/20/2020: from Dr. Crispin Aquino, Hepatology [facility]    Patient ID: Silvia Zabala is a 59 y.o. female.    Chief Complaint: No chief complaint on file.    HPI  I saw this 59 y.o. lady who came with her sister, brother in law and grandson. She just moved from  [place] to [place].  She was diagnosed with DUKES cirrhosis in 2015 when she presented with HE and a fall leading to serious facial injury.    - multiple bleeding episodes since then leading to multiple hospital admissions (6 admissions in 2019 and 2 admissions in ).  - ascites/edema 2 years ago but this became problematic in Dec 2019.    TIPS done in 2020 at [facility]    She describes a paradoxical improvement in her HE AFTER TIPS.    Her ascites and edema have gone and her diuretic dosease have now been reduced.    MELD-Na score: 10 at 2020 8:45 AM  MELD score: 10 at 2020 8:45 AM  Calculated from:  Serum Creatinine: 0.8 mg/dL (Rounded to 1 mg/dL) at 2020 8:45 AM  Serum Sodium: 137 mmol/L at 2020 8:45 AM  Total Bilirubin: 2.0 mg/dL at 2020 8:45 AM  INR(ratio): 1.1 at 2020 8:45 AM  Age: 59 years    There is no height or weight on file to calculate BMI.     PMH:  Poorly controlled DM  SLE  Epilepsy- petit mal (was this related to HE??)    No heart disease    Partial hystererctomy   Bladder repair-   Lap cholecystectomy-   Right knee reconstruction  TIPS- [facility]    FH:  Mother  of DUKES age 75    SH:  Lives in [place]  smiked for 4 years - 15 years ago  No alcohol        ALLERGIES  Albuterol, Imitrex [sumatriptan], Tramadol, Nsaids, Tylenol [acetaminophen], Zofran [ondansetron hcl], Lactulose, and Quinine derivatives    PAST MEDICAL HISTORY  Active Ambulatory Problems     Diagnosis Date Noted   • Cirrhosis of liver (CMS/HCC) 2016   • Rheumatoid arthritis (CMS/HCC) 2016   • Anxiety and depression 2016   • Type 2 diabetes mellitus, uncontrolled, with neuropathy (CMS/HCC) 03/15/2016   • Fibrositis 03/15/2016   • Change in blood platelet count 03/15/2016   • Pancytopenia (CMS/HCC) 2016   • Hypersplenism 2016   • Nausea & vomiting 2016   • DUKES (nonalcoholic steatohepatitis) 2016   • Hyperlipidemia     • Abdominal pain 02/14/2017   • Hematemesis 02/15/2017   • Ascites 02/21/2017   • Portal hypertension (CMS/HCC) 02/22/2017   • Systemic lupus (CMS/HCC) 02/22/2017   • Secondary esophageal varices without bleeding (CMS/HCC) 02/22/2017   • Esophageal varices with bleeding (CMS/HCC) 03/07/2017   • Gastroparesis 10/17/2017   • Cirrhosis of liver with ascites (CMS/HCC) 11/17/2017   • Diabetic ketoacidosis without coma associated with type 2 diabetes mellitus (CMS/HCC) 12/16/2017   • Diabetic peripheral neuropathy (CMS/HCC) 12/17/2017   • History of seizure disorder 12/17/2017   • Hepatic encephalopathy (CMS/HCC) 05/08/2018   • Thrombocytopenia (CMS/HCC) 05/16/2018   • Fever 05/17/2018   • Acute ITP (CMS/HCC) 05/18/2018   • Degeneration of lumbosacral intervertebral disc 05/30/2018   • Seizure (CMS/HCC) 05/30/2018   • Spondylosis without myelopathy 05/30/2018   • Intractable vomiting with nausea 10/03/2018   • UGI bleed 10/04/2018   • Migraine without aura 10/11/2018   • Urinary retention 10/11/2018   • Uncontrolled diabetes mellitus with hyperglycemia (CMS/HCC) 12/13/2018   • BRICE (obstructive sleep apnea) 12/13/2018   • Gastroparesis 12/13/2018   • Hyperammonemia (CMS/HCC) 12/13/2018   • Hyponatremia 12/13/2018   • Anemia 12/13/2018   • Vitamin D insufficiency 12/16/2018   • Hyperglycemia 01/17/2019   • Dehydration 01/17/2019   • Iron deficiency anemia 01/22/2019   • Adverse effect of iron or its compound, subsequent encounter 01/22/2019   • Hemorrhage of anus and rectum 02/15/2019   • Vulvar lesion 05/09/2019   • Acute upper GI bleed 05/09/2019   • Acute blood loss anemia 05/14/2019   • Acute hyperkalemia 05/14/2019   • Labial cyst 01/18/2021   • Transaminitis 02/03/2021   • Hyperbilirubinemia 02/03/2021   • Acute gastritis 02/03/2021   • UTI (urinary tract infection), bacterial 02/03/2021   • UTI (urinary tract infection) 02/03/2021   • Cholestatic pruritus 05/20/2020   • Fatty liver disease, nonalcoholic 05/20/2020    • Protein-calorie malnutrition, moderate (CMS/HCC) 05/20/2020     Resolved Ambulatory Problems     Diagnosis Date Noted   • Upper GI bleed 04/25/2017   • Abnormal finding of blood chemistry  05/08/2018     Past Medical History:   Diagnosis Date   • Anxiety    • Arthritis    • Broken shoulder    • Chromosomal abnormality    • Cirrhosis (CMS/HCC)    • Clostridium difficile infection    • Colon polyps    • Depression    • Diabetes mellitus (CMS/HCC)    • Duodenal ulcer with hemorrhage    • Esophageal varices determined by endoscopy (CMS/HCC)    • Fibromyalgia    • Gallbladder disease    • Gastric varices    • Glaucoma    • Granuloma annulare    • H/O B12 deficiency    • H/O Bleeding ulcer    • H/O mixed connective tissue disorder    • Hemorrhoids    • Hiatal hernia    • History of transfusion    • Hx of being hospitalized    • Migraine    • Mitral valve prolapse    • Orthostatic hypotension 02/2019   • Pancreas divisum    • Peptic ulcer disease    • PONV (postoperative nausea and vomiting)    • RA (rheumatoid arthritis) (CMS/HCC)    • Seizure disorder (CMS/HCC)    • Seizures (CMS/HCC)    • TIA (transient ischemic attack) 1984       PAST SURGICAL HISTORY  Past Surgical History:   Procedure Laterality Date   • BARTHOLIN CYST MARSUPIALIZATION Left 1/18/2021    Procedure: EXCISION OF LABIAL CYST;  Surgeon: Melinda Thakkar MD;  Location: [facility] MAIN OR;  Service: Obstetrics/Gynecology;  Laterality: Left;   • BLADDER REPAIR  1991   • CATARACT EXTRACTION     • CHOLECYSTECTOMY  1994   • COLONOSCOPY     • ENDOSCOPY N/A 11/7/2016    Procedure: ESOPHAGOGASTRODUODENOSCOPY WITH COLD POLYPECTOMY, BANDING,  AND CLIPS TIMES 2;  Surgeon: Rich Coleman MD;  Location: [facility] ENDOSCOPY;  Service:    • ENDOSCOPY N/A 12/22/2016    Procedure: ESOPHAGOGASTRODUODENOSCOPY WITH ESOPHAGEAL BANDING. 5 BANDS FIRED, 4 BANDS ADHERERD TO MUCOSA;  Surgeon: Rich Coleman MD;  Location: [facility] ENDOSCOPY;  Service:    • ENDOSCOPY N/A 2/15/2017  "   Procedure: ESOPHAGOGASTRODUODENOSCOPY AT BEDSIDE with esophageal varices banding (3);  Surgeon: Rich Coleman MD;  Location: [facility] ENDOSCOPY;  Service:    • ENDOSCOPY N/A 4/6/2017    Procedure: ESOPHAGOGASTRODUODENOSCOPY WITH ESOPHAGEAL VARICES BANDING x2;  Surgeon: Rich Coleman MD;  Location: [facility] ENDOSCOPY;  Service:    • ENDOSCOPY N/A 11/24/2017    Procedure: ESOPHAGOGASTRODUODENOSCOPY with biopsies;  Surgeon: Rich Coleman MD;  Location[facility] ENDOSCOPY;  Service:    • ENDOSCOPY N/A 10/5/2018    Procedure: ESOPHAGOGASTRODUODENOSCOPY;  Surgeon: Rich Coleman MD;  Location[facility] ENDOSCOPY;  Service: Gastroenterology   • ENDOSCOPY N/A 5/10/2019    Procedure: ESOPHAGOGASTRODUODENOSCOPY AT BEDSIDE WITH VARICEAL LIGATION;  Surgeon: Rich Coleman MD;  Location: [facility] ENDOSCOPY;  Service: Gastroenterology   • ENDOSCOPY N/A 6/28/2019    Procedure: ESOPHAGOGASTRODUODENOSCOPY WITH ESOPHAGEAL BANDING (3 BANDS);  Surgeon: Rich Coleman MD;  Location: [facility] ENDOSCOPY;  Service: Gastroenterology   • ENDOSCOPY AND COLONOSCOPY  2014    Dr. Rich Coleman with findings of candida esophagitis   • EYE SURGERY     • HYSTERECTOMY  1986    partial   • JOINT REPLACEMENT     • KNEE SURGERY Right 1995    \"right knee recontstruction\"   • LIVER BIOPSY  01/2015   • MASS EXCISION     • STOMACH SURGERY     • TIPS PROCEDURE  2020    x2       FAMILY HISTORY  Family History   Problem Relation Age of Onset   • Liver disease Other    • Depression Other    • Heart disease Other    • Hypertension Other    • Diabetes Other    • Breast cancer Other    • Brain cancer Other    • Uterine cancer Other    • Colon cancer Other    • Leukemia Other    • Colon cancer Maternal Aunt    • Hypertension Mother    • Diabetes type II Mother    • Rheum arthritis Mother    • Liver disease Mother         DUKES   • Heart disease Father    • Diabetes type II Father    • Diabetes type II Sister    • Lupus Sister    • Malig Hyperthermia Neg " Hx        SOCIAL HISTORY  Social History     Socioeconomic History   • Marital status:      Spouse name: Jose   • Number of children: Not on file   • Years of education: Not on file   • Highest education level: Not on file   Tobacco Use   • Smoking status: Former Smoker     Packs/day: 1.00     Years: 4.00     Pack years: 4.00     Types: Cigarettes     Quit date: 2015     Years since quittin.2   • Smokeless tobacco: Never Used   Vaping Use   • Vaping Use: Never used   Substance and Sexual Activity   • Alcohol use: No   • Drug use: Never   • Sexual activity: Defer       REVIEW OF SYSTEMS  Review of Systems    All systems reviewed and negative except for those discussed in HPI.     PHYSICAL EXAM    ED Triage Vitals [21 1636]   Temp Heart Rate Resp BP SpO2   97.3 °F (36.3 °C) 105 16 -- 94 %      Temp src Heart Rate Source Patient Position BP Location FiO2 (%)   -- -- -- -- --     Physical Exam    I have reviewed the triage vital signs and nursing notes.      GENERAL: not distressed, appears chronically ill  HENT: nares patent, membranes dry  EYES: no scleral icterus  NECK: no ROM limitations  CV: regular rhythm, tachycardia, no murmurs, no rubs no gallop  RESPIRATORY: normal effort, to auscultate bilaterally.  Effort is not increased there is no tachypnea.  She is able to speak in full sentences  ABDOMEN: soft, rounded, no focal tenderness.  Bowel sounds are normal.  There is no rebound and no guarding  : deferred  MUSCULOSKELETAL: no deformity  NEURO: alert, moves all extremities, follows commands  SKIN: warm, dry    LAB RESULTS  Recent Results (from the past 24 hour(s))   Comprehensive Metabolic Panel    Collection Time: 21  5:14 PM    Specimen: Blood   Result Value Ref Range    Glucose 606 (C) 65 - 99 mg/dL    BUN 23 (H) 6 - 20 mg/dL    Creatinine 1.05 (H) 0.57 - 1.00 mg/dL    Sodium 130 (L) 136 - 145 mmol/L    Potassium 4.1 3.5 - 5.2 mmol/L    Chloride 94 (L) 98 - 107 mmol/L    CO2  21.6 (L) 22.0 - 29.0 mmol/L    Calcium 9.6 8.6 - 10.5 mg/dL    Total Protein 6.4 6.0 - 8.5 g/dL    Albumin 3.90 3.50 - 5.20 g/dL    ALT (SGPT) 29 1 - 33 U/L    AST (SGOT) 28 1 - 32 U/L    Alkaline Phosphatase 180 (H) 39 - 117 U/L    Total Bilirubin 2.2 (H) 0.0 - 1.2 mg/dL    eGFR Non African Amer 54 (L) >60 mL/min/1.73    Globulin 2.5 gm/dL    A/G Ratio 1.6 g/dL    BUN/Creatinine Ratio 21.9 7.0 - 25.0    Anion Gap 14.4 5.0 - 15.0 mmol/L   Protime-INR    Collection Time: 04/01/21  5:14 PM    Specimen: Blood   Result Value Ref Range    Protime 16.3 (H) 11.7 - 14.2 Seconds    INR 1.33 (H) 0.90 - 1.10   Lipase    Collection Time: 04/01/21  5:14 PM    Specimen: Blood   Result Value Ref Range    Lipase 11 (L) 13 - 60 U/L   Procalcitonin    Collection Time: 04/01/21  5:14 PM    Specimen: Blood   Result Value Ref Range    Procalcitonin 0.34 (H) 0.00 - 0.25 ng/mL   Ethanol    Collection Time: 04/01/21  5:14 PM    Specimen: Blood   Result Value Ref Range    Ethanol <10 0 - 10 mg/dL    Ethanol % <0.010 %   Magnesium    Collection Time: 04/01/21  5:14 PM    Specimen: Blood   Result Value Ref Range    Magnesium 1.7 1.6 - 2.6 mg/dL   CBC Auto Differential    Collection Time: 04/01/21  5:14 PM    Specimen: Blood   Result Value Ref Range    WBC 4.71 3.40 - 10.80 10*3/mm3    RBC 4.37 3.77 - 5.28 10*6/mm3    Hemoglobin 13.3 12.0 - 15.9 g/dL    Hematocrit 39.2 34.0 - 46.6 %    MCV 89.7 79.0 - 97.0 fL    MCH 30.4 26.6 - 33.0 pg    MCHC 33.9 31.5 - 35.7 g/dL    RDW 14.5 12.3 - 15.4 %    RDW-SD 47.1 37.0 - 54.0 fl    MPV 10.1 6.0 - 12.0 fL    Platelets 107 (L) 140 - 450 10*3/mm3    Neutrophil % 78.6 (H) 42.7 - 76.0 %    Lymphocyte % 12.5 (L) 19.6 - 45.3 %    Monocyte % 6.4 5.0 - 12.0 %    Eosinophil % 1.1 0.3 - 6.2 %    Basophil % 0.6 0.0 - 1.5 %    Immature Grans % 0.8 (H) 0.0 - 0.5 %    Neutrophils, Absolute 3.70 1.70 - 7.00 10*3/mm3    Lymphocytes, Absolute 0.59 (L) 0.70 - 3.10 10*3/mm3    Monocytes, Absolute 0.30 0.10 - 0.90 10*3/mm3     Eosinophils, Absolute 0.05 0.00 - 0.40 10*3/mm3    Basophils, Absolute 0.03 0.00 - 0.20 10*3/mm3    Immature Grans, Absolute 0.04 0.00 - 0.05 10*3/mm3    nRBC 0.0 0.0 - 0.2 /100 WBC   Light Blue Top    Collection Time: 04/01/21  5:14 PM   Result Value Ref Range    Extra Tube hold for add-on    Green Top (Gel)    Collection Time: 04/01/21  5:14 PM   Result Value Ref Range    Extra Tube Hold for add-ons.    Lavender Top    Collection Time: 04/01/21  5:14 PM   Result Value Ref Range    Extra Tube hold for add-on    Gold Top - SST    Collection Time: 04/01/21  5:14 PM   Result Value Ref Range    Extra Tube Hold for add-ons.    COVID-19, MARY KATE IN-HOUSE CEPHEID, NP SWAB IN TRANSPORT MEDIA 8-12 HR TAT - Swab, Nasopharynx    Collection Time: 04/01/21  5:30 PM    Specimen: Nasopharynx; Swab   Result Value Ref Range    COVID19 Not Detected Not Detected - Ref. Range   Lactic Acid, Plasma    Collection Time: 04/01/21  5:35 PM    Specimen: Blood   Result Value Ref Range    Lactate 3.3 (C) 0.5 - 2.0 mmol/L   Urinalysis With Culture If Indicated - Urine, Catheter In/Out    Collection Time: 04/01/21  5:45 PM    Specimen: Urine, Catheter In/Out   Result Value Ref Range    Color, UA Yellow Yellow, Straw    Appearance, UA Clear Clear    pH, UA <=5.0 5.0 - 8.0    Specific Gravity, UA >=1.030 1.005 - 1.030    Glucose, UA >=1000 mg/dL (3+) (A) Negative    Ketones, UA 15 mg/dL (1+) (A) Negative    Bilirubin, UA Negative Negative    Blood, UA Negative Negative    Protein, UA Negative Negative    Leuk Esterase, UA Negative Negative    Nitrite, UA Negative Negative    Urobilinogen, UA 0.2 E.U./dL 0.2 - 1.0 E.U./dL   Urine Drug Screen - Urine, Catheter    Collection Time: 04/01/21  5:45 PM    Specimen: Urine, Catheter   Result Value Ref Range    Amphet/Methamphet, Screen Negative Negative    Barbiturates Screen, Urine Negative Negative    Benzodiazepine Screen, Urine Negative Negative    Cocaine Screen, Urine Negative Negative    Opiate  Screen Negative Negative    THC, Screen, Urine Negative Negative    Methadone Screen, Urine Negative Negative    Oxycodone Screen, Urine Positive (A) Negative   ECG 12 Lead    Collection Time: 04/01/21  6:32 PM   Result Value Ref Range    QT Interval 384 ms   POC Glucose Once    Collection Time: 04/01/21  7:03 PM    Specimen: Blood   Result Value Ref Range    Glucose 445 (C) 70 - 130 mg/dL         RADIOLOGY RESULTS  XR Abdomen 2+ VW with Chest 1 VW   Final Result            PROGRESS, DATA ANALYSIS, CONSULTS AND MEDICAL DECISION MAKING  All labs have been independently reviewed by me.  All radiology studies have been reviewed by me and discussed with radiologist dictating the report.  EKG's independently viewed and interpreted by me unless stated otherwise. Discussion below represents my analysis of pertinent findings related to patient's condition, differential diagnosis, treatment plan and final disposition.     ED Course as of Apr 01 1942   Thu Apr 01, 2021   1822 Lactate(!!): 3.3 [EW]   1822 Glucose(!!): 606 [EW]   1822 Sodium(!): 130 [EW]   1822 Total Bilirubin(!): 2.2 [EW]   1822 Pt is extremely hyperglycemic.  Her BG is 606.  Her procalitonin and lactic acid are elevated, I have ordered a dose of Rocephin.     [EW]   1903 I discussed admission with Dr. Nogueira, on call for A.  He agrees to admit patient to hospital.     [EW]   1905 Patient updated about admission to the hospital.  Her repeat blood glucose is 445 which is improving.    [EW]      ED Course User Index  [EW] Anisha Malagon, APRN     DDx: Nausea and vomiting, poorly controlled diabetes, chronic health problems    MDM: Patient with glucose level of over 600 upon arrival.  She has poorly controlled diabetes.  She is not in DKA.  She has multiple chronic health problems with nausea and vomiting today and will be admitted to the hospital.  She appears ill but not toxic.  Antibiotics started to cover for elevated lactic and elevated procalcitonin.      "Reviewed pt's history and workup with Dr. Tay.  After a bedside evaluation, Dr. Tay agrees with the plan of care.    Based on the patient's lab findings and presenting symptoms, the doctor and I feel it is appropriate to admit the patient for further management, evaluation, and treatment.  I have discussed this with the admitting team.  I have also discussed this with the patient/family.  They are in agreement with admission.          Disposition vitals:  /69   Pulse 92   Temp 97.3 °F (36.3 °C)   Resp 16   Ht 170.2 cm (67\")   Wt 81.6 kg (180 lb)   LMP  (LMP Unknown)   SpO2 94%   BMI 28.19 kg/m²       DIAGNOSIS  Final diagnoses:   Hyperglycemia due to type 1 diabetes mellitus (CMS/HCC)   Non-intractable vomiting with nausea, unspecified vomiting type   Thrombocytopenia (CMS/HCC)   Hyperbilirubinemia   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 (CMS/HCC)       Admission     Anisha Malagon, APRN  04/01/21 1944    "

## 2021-04-01 NOTE — ED PROVIDER NOTES
17:25 EDT  17:25 EDT  Patient seen and examined with nurse practitioner.  Briefly patient presents for evaluation of multiple complaints.  Main complaint seems to be nausea and vomiting.  Patient has had weakness.  Had some palpitations.  Has multiple chronic medical issues.  Symptoms have been going on for 3 weeks..  Has had no chest pain or pressure          On exam patient is alert cooperative in no distress.  Patient's heart is mildly tachycardic her lungs are clear bilaterally.  His abdomen soft and nontender.        Plan is lab evaluation          MD ATTESTATION NOTE    The AYESHA and I have discussed this patient's history, physical exam, and treatment plan.  I have reviewed the documentation and personally had a face to face interaction with the patient. I affirm the documentation and agree with the treatment and plan.  The attached note describes my personal findings.      Patient was wearing a face mask when I entered the room and they continued to wear a mask throughout their stay in the ED.  I wore PPE, including  gloves, face mask with shield or face mask with goggles whenever I was in the room with patient.      Vitor Tay MD  04/01/21 2006

## 2021-04-01 NOTE — TELEPHONE ENCOUNTER
Spoke with Mary Beth at [employer] [phone] to initiate a PA as CMM was not able to process. Xifaxan approved valid through 09/28/2021 with PA case #[id number]. Patient currently hospitalized.

## 2021-04-02 ENCOUNTER — APPOINTMENT (OUTPATIENT)
Dept: ULTRASOUND IMAGING | Facility: HOSPITAL | Age: 61
End: 2021-04-02

## 2021-04-02 PROBLEM — K75.81 LIVER CIRRHOSIS SECONDARY TO NASH: Status: ACTIVE | Noted: 2017-11-17

## 2021-04-02 PROBLEM — R79.89 ELEVATED PROCALCITONIN: Status: ACTIVE | Noted: 2021-04-02

## 2021-04-02 LAB
ALBUMIN SERPL-MCNC: 3.2 G/DL (ref 3.5–5.2)
ALBUMIN/GLOB SERPL: 1.5 G/DL
ALP SERPL-CCNC: 141 U/L (ref 39–117)
ALT SERPL W P-5'-P-CCNC: 23 U/L (ref 1–33)
ANION GAP SERPL CALCULATED.3IONS-SCNC: 9.6 MMOL/L (ref 5–15)
AST SERPL-CCNC: 24 U/L (ref 1–32)
BASOPHILS # BLD AUTO: 0.03 10*3/MM3 (ref 0–0.2)
BASOPHILS NFR BLD AUTO: 0.7 % (ref 0–1.5)
BILIRUB SERPL-MCNC: 1.4 MG/DL (ref 0–1.2)
BUN SERPL-MCNC: 20 MG/DL (ref 6–20)
BUN/CREAT SERPL: 24.4 (ref 7–25)
CALCIUM SPEC-SCNC: 8.5 MG/DL (ref 8.6–10.5)
CHLORIDE SERPL-SCNC: 101 MMOL/L (ref 98–107)
CO2 SERPL-SCNC: 21.4 MMOL/L (ref 22–29)
CREAT SERPL-MCNC: 0.82 MG/DL (ref 0.57–1)
D-LACTATE SERPL-SCNC: 1.3 MMOL/L (ref 0.5–2)
DEPRECATED RDW RBC AUTO: 46.7 FL (ref 37–54)
EOSINOPHIL # BLD AUTO: 0.07 10*3/MM3 (ref 0–0.4)
EOSINOPHIL NFR BLD AUTO: 1.7 % (ref 0.3–6.2)
ERYTHROCYTE [DISTWIDTH] IN BLOOD BY AUTOMATED COUNT: 14.5 % (ref 12.3–15.4)
GFR SERPL CREATININE-BSD FRML MDRD: 72 ML/MIN/1.73
GLOBULIN UR ELPH-MCNC: 2.1 GM/DL
GLUCOSE BLDC GLUCOMTR-MCNC: 240 MG/DL (ref 70–130)
GLUCOSE BLDC GLUCOMTR-MCNC: 355 MG/DL (ref 70–130)
GLUCOSE BLDC GLUCOMTR-MCNC: 389 MG/DL (ref 70–130)
GLUCOSE BLDC GLUCOMTR-MCNC: 392 MG/DL (ref 70–130)
GLUCOSE BLDC GLUCOMTR-MCNC: 97 MG/DL (ref 70–130)
GLUCOSE SERPL-MCNC: 367 MG/DL (ref 65–99)
HCT VFR BLD AUTO: 32.3 % (ref 34–46.6)
HGB BLD-MCNC: 11.2 G/DL (ref 12–15.9)
IMM GRANULOCYTES # BLD AUTO: 0.03 10*3/MM3 (ref 0–0.05)
IMM GRANULOCYTES NFR BLD AUTO: 0.7 % (ref 0–0.5)
LYMPHOCYTES # BLD AUTO: 0.83 10*3/MM3 (ref 0.7–3.1)
LYMPHOCYTES NFR BLD AUTO: 20.3 % (ref 19.6–45.3)
MCH RBC QN AUTO: 30.4 PG (ref 26.6–33)
MCHC RBC AUTO-ENTMCNC: 34.7 G/DL (ref 31.5–35.7)
MCV RBC AUTO: 87.5 FL (ref 79–97)
MONOCYTES # BLD AUTO: 0.41 10*3/MM3 (ref 0.1–0.9)
MONOCYTES NFR BLD AUTO: 10 % (ref 5–12)
NEUTROPHILS NFR BLD AUTO: 2.71 10*3/MM3 (ref 1.7–7)
NEUTROPHILS NFR BLD AUTO: 66.6 % (ref 42.7–76)
NRBC BLD AUTO-RTO: 0 /100 WBC (ref 0–0.2)
PLATELET # BLD AUTO: 95 10*3/MM3 (ref 140–450)
PMV BLD AUTO: 9.8 FL (ref 6–12)
POTASSIUM SERPL-SCNC: 4.3 MMOL/L (ref 3.5–5.2)
PROT SERPL-MCNC: 5.3 G/DL (ref 6–8.5)
RBC # BLD AUTO: 3.69 10*6/MM3 (ref 3.77–5.28)
SODIUM SERPL-SCNC: 132 MMOL/L (ref 136–145)
WBC # BLD AUTO: 4.08 10*3/MM3 (ref 3.4–10.8)

## 2021-04-02 PROCEDURE — G0378 HOSPITAL OBSERVATION PER HR: HCPCS

## 2021-04-02 PROCEDURE — 80053 COMPREHEN METABOLIC PANEL: CPT | Performed by: INTERNAL MEDICINE

## 2021-04-02 PROCEDURE — 97110 THERAPEUTIC EXERCISES: CPT

## 2021-04-02 PROCEDURE — 63710000001 INSULIN LISPRO (HUMAN) PER 5 UNITS: Performed by: INTERNAL MEDICINE

## 2021-04-02 PROCEDURE — 97161 PT EVAL LOW COMPLEX 20 MIN: CPT

## 2021-04-02 PROCEDURE — 99213 OFFICE O/P EST LOW 20 MIN: CPT | Performed by: INTERNAL MEDICINE

## 2021-04-02 PROCEDURE — 63710000001 INSULIN GLARGINE PER 5 UNITS: Performed by: INTERNAL MEDICINE

## 2021-04-02 PROCEDURE — 83605 ASSAY OF LACTIC ACID: CPT | Performed by: INTERNAL MEDICINE

## 2021-04-02 PROCEDURE — 85025 COMPLETE CBC W/AUTO DIFF WBC: CPT | Performed by: INTERNAL MEDICINE

## 2021-04-02 PROCEDURE — 82962 GLUCOSE BLOOD TEST: CPT

## 2021-04-02 RX ORDER — FLUOXETINE 10 MG/1
10 CAPSULE ORAL DAILY
Status: DISCONTINUED | OUTPATIENT
Start: 2021-04-02 | End: 2021-04-06 | Stop reason: HOSPADM

## 2021-04-02 RX ORDER — PROMETHAZINE HYDROCHLORIDE 25 MG/1
25 TABLET ORAL EVERY 8 HOURS PRN
COMMUNITY

## 2021-04-02 RX ORDER — FLASH GLUCOSE SENSOR
KIT MISCELLANEOUS
OUTPATIENT
Start: 2021-04-02

## 2021-04-02 RX ORDER — FLUOXETINE 10 MG/1
20 CAPSULE ORAL DAILY
COMMUNITY

## 2021-04-02 RX ADMIN — FLUOXETINE HYDROCHLORIDE 10 MG: 10 CAPSULE ORAL at 11:54

## 2021-04-02 RX ADMIN — SUCRALFATE 1 G: 1 TABLET ORAL at 11:53

## 2021-04-02 RX ADMIN — METOCLOPRAMIDE 10 MG: 10 TABLET ORAL at 20:38

## 2021-04-02 RX ADMIN — OXYCODONE 7.5 MG: 5 TABLET ORAL at 20:38

## 2021-04-02 RX ADMIN — INSULIN GLARGINE 80 UNITS: 100 INJECTION, SOLUTION SUBCUTANEOUS at 21:57

## 2021-04-02 RX ADMIN — MIRTAZAPINE 15 MG: 15 TABLET, FILM COATED ORAL at 20:38

## 2021-04-02 RX ADMIN — METOCLOPRAMIDE 10 MG: 10 TABLET ORAL at 06:56

## 2021-04-02 RX ADMIN — INSULIN LISPRO 8 UNITS: 100 INJECTION, SOLUTION INTRAVENOUS; SUBCUTANEOUS at 12:37

## 2021-04-02 RX ADMIN — DOCUSATE SODIUM 100 MG: 100 CAPSULE, LIQUID FILLED ORAL at 08:35

## 2021-04-02 RX ADMIN — SUCRALFATE 1 G: 1 TABLET ORAL at 17:21

## 2021-04-02 RX ADMIN — INSULIN LISPRO 35 UNITS: 100 INJECTION, SOLUTION INTRAVENOUS; SUBCUTANEOUS at 08:32

## 2021-04-02 RX ADMIN — SUCRALFATE 1 G: 1 TABLET ORAL at 20:38

## 2021-04-02 RX ADMIN — METOCLOPRAMIDE 10 MG: 10 TABLET ORAL at 11:54

## 2021-04-02 RX ADMIN — LEVETIRACETAM 500 MG: 500 TABLET, FILM COATED ORAL at 08:35

## 2021-04-02 RX ADMIN — INSULIN GLARGINE 80 UNITS: 100 INJECTION, SOLUTION SUBCUTANEOUS at 08:31

## 2021-04-02 RX ADMIN — HYDROXYZINE HYDROCHLORIDE 50 MG: 50 TABLET ORAL at 20:38

## 2021-04-02 RX ADMIN — PANTOPRAZOLE SODIUM 40 MG: 40 TABLET, DELAYED RELEASE ORAL at 06:56

## 2021-04-02 RX ADMIN — PREGABALIN 75 MG: 75 CAPSULE ORAL at 20:38

## 2021-04-02 RX ADMIN — FERROUS SULFATE TAB 325 MG (65 MG ELEMENTAL FE) 325 MG: 325 (65 FE) TAB at 08:35

## 2021-04-02 RX ADMIN — CHOLESTYRAMINE 8 G: 4 POWDER, FOR SUSPENSION ORAL at 08:31

## 2021-04-02 RX ADMIN — SUCRALFATE 1 G: 1 TABLET ORAL at 06:56

## 2021-04-02 RX ADMIN — Medication 1 TABLET: at 08:35

## 2021-04-02 RX ADMIN — INSULIN LISPRO 4 UNITS: 100 INJECTION, SOLUTION INTRAVENOUS; SUBCUTANEOUS at 17:21

## 2021-04-02 RX ADMIN — PREGABALIN 75 MG: 75 CAPSULE ORAL at 08:34

## 2021-04-02 RX ADMIN — SODIUM CHLORIDE 125 ML/HR: 9 INJECTION, SOLUTION INTRAVENOUS at 17:21

## 2021-04-02 RX ADMIN — SODIUM CHLORIDE, PRESERVATIVE FREE 10 ML: 5 INJECTION INTRAVENOUS at 08:35

## 2021-04-02 RX ADMIN — MAGNESIUM OXIDE 400 MG (241.3 MG MAGNESIUM) TABLET 400 MG: TABLET at 08:35

## 2021-04-02 RX ADMIN — LEVETIRACETAM 500 MG: 500 TABLET, FILM COATED ORAL at 20:38

## 2021-04-02 RX ADMIN — METOCLOPRAMIDE 10 MG: 10 TABLET ORAL at 17:21

## 2021-04-02 RX ADMIN — INSULIN LISPRO 60 UNITS: 100 INJECTION, SOLUTION INTRAVENOUS; SUBCUTANEOUS at 12:36

## 2021-04-02 RX ADMIN — OXYCODONE 7.5 MG: 5 TABLET ORAL at 08:34

## 2021-04-02 RX ADMIN — SODIUM CHLORIDE, PRESERVATIVE FREE 10 ML: 5 INJECTION INTRAVENOUS at 20:38

## 2021-04-02 RX ADMIN — SODIUM CHLORIDE 125 ML/HR: 9 INJECTION, SOLUTION INTRAVENOUS at 07:32

## 2021-04-02 RX ADMIN — ERGOCALCIFEROL 50000 UNITS: 1.25 CAPSULE ORAL at 08:35

## 2021-04-02 RX ADMIN — INSULIN LISPRO 60 UNITS: 100 INJECTION, SOLUTION INTRAVENOUS; SUBCUTANEOUS at 17:21

## 2021-04-02 RX ADMIN — CHOLESTYRAMINE 8 G: 4 POWDER, FOR SUSPENSION ORAL at 22:29

## 2021-04-02 RX ADMIN — Medication 1000 MCG: at 08:35

## 2021-04-02 RX ADMIN — RIFAXIMIN 550 MG: 550 TABLET ORAL at 08:31

## 2021-04-02 RX ADMIN — RIFAXIMIN 550 MG: 550 TABLET ORAL at 20:38

## 2021-04-02 NOTE — PLAN OF CARE
"Goal Outcome Evaluation:  Plan of Care Reviewed With: patient  Progress: no change  Outcome Summary: received from er with c/o fatigue, \"not feeling well\", glucose down to 357, 80 units of lantus given. Med for abd pain x 1 with some relief stated, latic acid  down to 1.3, NSR on monitor, VSS, eyes closed at long interval,  resting quietly in room, will continue to monitor  "

## 2021-04-02 NOTE — PROGRESS NOTES
Discharge Planning Assessment  [facility]     Patient Name: Silvia Zabala  MRN: [id number]  Today's Date: 4/2/2021    Admit Date: 4/1/2021    Discharge Needs Assessment     Row Name 04/02/21 0902       Living Environment    Lives With  spouse;other (see comments)    Name(s) of Who Lives With Patient  Spouse  ( Jose Zabala  [phone]) and mother in law ( Kary)    Current Living Arrangements  home/apartment/condo    Primary Care Provided by  self    Provides Primary Care For  no one    Family Caregiver if Needed  spouse    Family Caregiver Names  Spouse  ( Jose Zabala  [phone]) and mother in law ( Kary)    Quality of Family Relationships  involved;supportive    Able to Return to Prior Arrangements  yes    Living Arrangement Comments  Pt lives in a first floor apartment with spouse  ( Jose Zabala  [phone]) and mother in law ( Kary).       Resource/Environmental Concerns    Resource/Environmental Concerns  none    Transportation Concerns  car, none       Transition Planning    Patient/Family Anticipates Transition to  home with family    Patient/Family Anticipated Services at Transition  none    Transportation Anticipated  family or friend will provide       Discharge Needs Assessment    Equipment Currently Used at Home  bath bench;cane, straight;glucometer;grab bar;rollator    Concerns to be Addressed  denies needs/concerns at this time    Anticipated Changes Related to Illness  none    Equipment Needed After Discharge  bath bench;cane, straight;grab bar, tub/shower;glucometer;rollator        Discharge Plan     Row Name 04/02/21 0907       Plan    Plan  Plan home with family.  STEVAN Rayo RN    Patient/Family in Agreement with Plan  yes    Plan Comments  FACE SHEET VERIFIED/ COLLINS SIGNED.  Spoke with pt at bedside.  Pt's PCP is BEN Sher.  Pt lives in a first floor apartment with spouse  ( Jose Zabala  [phone]) and mother in law ( Kary).  Pt is independent with  ADLs.  Pt has a bath bench, straight cane, grab bars, glucometer, and rollator for home use.  Pt gets prescriptions at [facility] ([place]  Pt is working with insurance for insulin coverage.  Pt is not current with HH.  Pt has not been in SNF.  Pt denies any discharge needs.  Pt's spouse will transport pt home.  Plan home with family.  STEVAN Rayo RN        Continued Care and Services - Admitted Since 4/1/2021    Coordination has not been started for this encounter.         Demographic Summary     Row Name 04/02/21 0901       General Information    Admission Type  observation    Arrived From  emergency department    Required Notices Provided  Observation Status Notice    Referral Source  admission list    Reason for Consult  discharge planning    Preferred Language  English     Used During This Interaction  no        Functional Status     Row Name 04/02/21 0901       Functional Status    Usual Activity Tolerance  moderate    Current Activity Tolerance  moderate       Functional Status, IADL    Medications  independent    Meal Preparation  independent    Housekeeping  independent    Laundry  independent    Shopping  independent        Psychosocial    No documentation.       Abuse/Neglect    No documentation.       Legal    No documentation.       Substance Abuse    No documentation.       Patient Forms    No documentation.           Liset Rayo, SRIKANTH

## 2021-04-02 NOTE — PROGRESS NOTES
Continued Stay Note  [facility]     Patient Name: Silvia Zabala  MRN: [id number]  Today's Date: 4/2/2021    Admit Date: 4/1/2021    Discharge Plan     Row Name 04/02/21 0907       Plan    Plan  Plan home with family.  STEVAN Rayo RN    Patient/Family in Agreement with Plan  yes    Plan Comments  FACE SHEET VERIFIED/ COLLINS SIGNED.  Spoke with pt at bedside.  Pt's PCP is BEN Sher.  Pt lives in a first floor apartment with spouse  ( Jose Zabala  [phone]) and mother in law ( Kary).  Pt is independent with ADLs.  Pt has a bath bench, straight cane, grab bars, glucometer, and rollator for home use.  Pt gets prescriptions at [facility] ([place]).  Pt is working with insurance for insulin coverage.  Pt is not current with .  Pt has not been in SNF.  Pt denies any discharge needs.  Pt's spouse will transport pt home.  Plan home with family.  STEVAN Rayo RN        Discharge Codes    No documentation.             Liset Rayo RN

## 2021-04-02 NOTE — THERAPY EVALUATION
Patient Name: Silvia Zabala  : 1960    MRN: [id number]                              Today's Date: 2021       Admit Date: 2021    Visit Dx:     ICD-10-CM ICD-9-CM   1. Hyperglycemia due to type 1 diabetes mellitus (CMS/HCC)  E10.65 250.01   2. Non-intractable vomiting with nausea, unspecified vomiting type  R11.2 787.01   3. Thrombocytopenia (CMS/HCC)  D69.6 287.5   4. Hyperbilirubinemia  E80.6 782.4   5. 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 Gastroparesis  K31.84 536.3     Patient Active Problem List   Diagnosis   • Cirrhosis of liver (CMS/HCC)   • Rheumatoid arthritis (CMS/HCC)   • Anxiety and depression   • Type 2 diabetes mellitus, uncontrolled, with neuropathy (CMS/HCC)   • Fibrositis   • Change in blood platelet count   • Pancytopenia (CMS/HCC)   • Hypersplenism   • Nausea & vomiting   • DUKES (nonalcoholic steatohepatitis)   • Hyperlipidemia   • Abdominal pain   • Hematemesis   • Ascites   • Portal hypertension (CMS/HCC)   • Systemic lupus (CMS/HCC)   • Secondary esophageal varices without bleeding (CMS/HCC)   • Esophageal varices with bleeding (CMS/HCC)   • Gastroparesis   • 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 (CMS/HCC)   • Diabetic ketoacidosis without coma associated with type 2 diabetes mellitus (CMS/HCC)   • Diabetic peripheral neuropathy (CMS/HCC)   • History of seizure disorder   • Hepatic encephalopathy (CMS/HCC)   • Thrombocytopenia (CMS/HCC)   • Fever   • Acute ITP (CMS/HCC)   • Degeneration of lumbosacral intervertebral disc   • Seizure (CMS/HCC)   • Spondylosis without myelopathy   • Intractable vomiting with nausea   • UGI bleed   • Migraine without aura   • Urinary retention   • Uncontrolled diabetes mellitus with hyperglycemia (CMS/HCC)   • BRICE (obstructive sleep apnea)   • Gastroparesis   • Hyperammonemia (CMS/HCC)   • Hyponatremia   • Anemia   • Vitamin D insufficiency   • Hyperglycemia   • Dehydration   • Iron deficiency anemia   •  Adverse effect of iron or its compound, subsequent encounter   • Hemorrhage of anus and rectum   • Vulvar lesion   • Acute upper GI bleed   • Acute blood loss anemia   • Acute hyperkalemia   • Labial cyst   • Transaminitis   • Hyperbilirubinemia   • Acute gastritis   • UTI (urinary tract infection), bacterial   • UTI (urinary tract infection)   • Cholestatic pruritus   • Fatty liver disease, nonalcoholic   • Protein-calorie malnutrition, moderate (CMS/HCC)   • Elevated procalcitonin     Past Medical History:   Diagnosis Date   • Anemia    • Anxiety    • Arthritis    • Broken shoulder     left shoulder    • Chromosomal abnormality     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   • Cirrhosis (CMS/HCC)    • Clostridium difficile infection    • Colon polyps    • Depression    • Diabetes mellitus (CMS/HCC)     Adult onset, insulin requiring   • Duodenal ulcer with hemorrhage    • Esophageal varices determined by endoscopy (CMS/HCC)    • Fibromyalgia    • Gallbladder disease    • Gastric varices    • Gastroparesis    • Glaucoma    • Granuloma annulare    • H/O B12 deficiency    • H/O Bleeding ulcer     And gastroesophageal varices   • H/O mixed connective tissue disorder    • Hemorrhoids    • Hiatal hernia    • History of transfusion     needs bendryl  flushes sensation and temp goes up   • Hx of being hospitalized     In [place] for GI bleeding from ulcer and varices   • Hyperlipidemia    • Migraine    • Mitral valve prolapse    • DUKES (nonalcoholic steatohepatitis) 11/2016   • Orthostatic hypotension 02/2019   • BRICE (obstructive sleep apnea)    • Pancreas divisum    • Pancytopenia (CMS/HCC)     Chronic, due to cirrhosis and hypersplenism   • Peptic ulcer disease     And esophageal varices   • PONV (postoperative nausea and vomiting)    • RA (rheumatoid arthritis) (CMS/HCC)    • Seizure disorder (CMS/HCC)     last 2003   • Seizures (CMS/HCC)    •  Systemic lupus (CMS/HCC)    • TIA (transient ischemic attack) 1984     Past Surgical History:   Procedure Laterality Date   • BARTHOLIN CYST MARSUPIALIZATION Left 1/18/2021    Procedure: EXCISION OF LABIAL CYST;  Surgeon: Melinda Thakkar MD;  Location: [facility] MAIN OR;  Service: Obstetrics/Gynecology;  Laterality: Left;   • BLADDER REPAIR  1991   • CATARACT EXTRACTION     • CHOLECYSTECTOMY  1994   • COLONOSCOPY     • ENDOSCOPY N/A 11/7/2016    Procedure: ESOPHAGOGASTRODUODENOSCOPY WITH COLD POLYPECTOMY, BANDING,  AND CLIPS TIMES 2;  Surgeon: Rich Coleman MD;  Location: [facility] ENDOSCOPY;  Service:    • ENDOSCOPY N/A 12/22/2016    Procedure: ESOPHAGOGASTRODUODENOSCOPY WITH ESOPHAGEAL BANDING. 5 BANDS FIRED, 4 BANDS ADHERERD TO MUCOSA;  Surgeon: Rich Coleman MD;  Location: [facility] ENDOSCOPY;  Service:    • ENDOSCOPY N/A 2/15/2017    Procedure: ESOPHAGOGASTRODUODENOSCOPY AT BEDSIDE with esophageal varices banding (3);  Surgeon: Rich Coleman MD;  Location: [facility] ENDOSCOPY;  Service:    • ENDOSCOPY N/A 4/6/2017    Procedure: ESOPHAGOGASTRODUODENOSCOPY WITH ESOPHAGEAL VARICES BANDING x2;  Surgeon: Rich Coleman MD;  Location: [facility] ENDOSCOPY;  Service:    • ENDOSCOPY N/A 11/24/2017    Procedure: ESOPHAGOGASTRODUODENOSCOPY with biopsies;  Surgeon: Rich Coleman MD;  Location: [facility] ENDOSCOPY;  Service:    • ENDOSCOPY N/A 10/5/2018    Procedure: ESOPHAGOGASTRODUODENOSCOPY;  Surgeon: Rich Coleman MD;  Location: [facility] ENDOSCOPY;  Service: Gastroenterology   • ENDOSCOPY N/A 5/10/2019    Procedure: ESOPHAGOGASTRODUODENOSCOPY AT BEDSIDE WITH VARICEAL LIGATION;  Surgeon: Rich Coleman MD;  Location: [facility] ENDOSCOPY;  Service: Gastroenterology   • ENDOSCOPY N/A 6/28/2019    Procedure: ESOPHAGOGASTRODUODENOSCOPY WITH ESOPHAGEAL BANDING (3 BANDS);  Surgeon: Rich Coleman MD;  Location: [facility] ENDOSCOPY;  Service: Gastroenterology   • ENDOSCOPY AND COLONOSCOPY  2014    Dr. Rich Coleman with  "findings of candida esophagitis   • EYE SURGERY     • HYSTERECTOMY  1986    partial   • JOINT REPLACEMENT     • KNEE SURGERY Right 1995    \"right knee recontstruction\"   • LIVER BIOPSY  01/2015   • MASS EXCISION     • STOMACH SURGERY     • TIPS PROCEDURE  2020    x2     General Information     Row Name 04/02/21 1612          Physical Therapy Time and Intention    Document Type  evaluation  -     Mode of Treatment  physical therapy  -     Row Name 04/02/21 1612          General Information    Patient Profile Reviewed  yes  -MW     Prior Level of Function  independent:;all household mobility Rollator  -     Existing Precautions/Restrictions  fall  -MW     Row Name 04/02/21 1612          Cognition    Orientation Status (Cognition)  oriented x 4  -MW     Row Name 04/02/21 1612          Safety Issues, Functional Mobility    Comment, Safety Issues/Impairments (Mobility)  Gait belt used for safety  -       User Key  (r) = Recorded By, (t) = Taken By, (c) = Cosigned By    Initials Name Provider Type    Michelle Garcia, PT Physical Therapist        Mobility     Row Name 04/02/21 1613          Bed Mobility    Bed Mobility  supine-sit-supine  -MW     Supine-Sit-Supine [place] (Bed Mobility)  modified independence  -     Assistive Device (Bed Mobility)  head of bed elevated  -     Row Name 04/02/21 1613          Sit-Stand Transfer    Sit-Stand [place] (Transfers)  supervision;contact guard  -     Assistive Device (Sit-Stand Transfers)  walker, front-wheeled  -     Row Name 04/02/21 1613          Gait/Stairs (Locomotion)    [place] Level (Gait)  contact guard;minimum assist (75% patient effort)  -     Assistive Device (Gait)  walker, front-wheeled  -     Distance in Feet (Gait)  30  -MW     Deviations/Abnormal Patterns (Gait)  gait speed decreased;right sided deviations  -MW     Right Sided Gait Deviations  heel strike decreased;knee buckling, right side  -     Comment (Gait/Stairs)  R knee " jason during ambulation, no LOB, further limited by c/o fatigue and weakness  -       User Key  (r) = Recorded By, (t) = Taken By, (c) = Cosigned By    Initials Name Provider Type    Michelle Garcia PT Physical Therapist        Obj/Interventions     Row Name 04/02/21 1615          Range of Motion Comprehensive    General Range of Motion  bilateral lower extremity ROM WFL  -MW     Row Name 04/02/21 1615          Strength Comprehensive (MMT)    Comment, General Manual Muscle Testing (MMT) Assessment  BLE strength grossly 3+/5  -MW     Row Name 04/02/21 1615          Balance    Balance Assessment  sitting static balance;standing static balance  -MW     Static Sitting Balance  WFL  -MW     Static Standing Balance  mild impairment;supported  -MW     Row Name 04/02/21 1615          Sensory Assessment (Somatosensory)    Sensory Assessment (Somatosensory)  other (see comments) Pt denies numbness or tingling but does have at times due to diabetic neruopathy  -MW       User Key  (r) = Recorded By, (t) = Taken By, (c) = Cosigned By    Initials Name Provider Type    Michelle Garcia PT Physical Therapist        Goals/Plan     Row Name 04/02/21 1622          Bed Mobility Goal 1 (PT)    Activity/Assistive Device (Bed Mobility Goal 1, PT)  bed mobility activities, all  -MW     [place] Level/Cues Needed (Bed Mobility Goal 1, PT)  independent  -MW     Time Frame (Bed Mobility Goal 1, PT)  by discharge  -MW     Row Name 04/02/21 1622          Transfer Goal 1 (PT)    Activity/Assistive Device (Transfer Goal 1, PT)  transfers, all;walker, rolling  -MW     [place] Level/Cues Needed (Transfer Goal 1, PT)  supervision required  -MW     Time Frame (Transfer Goal 1, PT)  by discharge  -MW     Row Name 04/02/21 1622          Gait Training Goal 1 (PT)    Activity/Assistive Device (Gait Training Goal 1, PT)  gait (walking locomotion);assistive device use;walker, rolling  -MW     [place] Level (Gait Training Goal 1, PT)   "supervision required  -MW     Distance (Gait Training Goal 1, PT)  100  -MW     Time Frame (Gait Training Goal 1, PT)  by discharge  -       User Key  (r) = Recorded By, (t) = Taken By, (c) = Cosigned By    Initials Name Provider Type    Michelle Garcia, PT Physical Therapist        Clinical Impression     Row Name 04/02/21 1616          Pain    Additional Documentation  Pain Scale: Numbers Pre/Post-Treatment (Group)  -     Row Name 04/02/21 1616          Pain Scale: Numbers Pre/Post-Treatment    Pretreatment Pain Rating  7/10  -MW     Posttreatment Pain Rating  7/10  -MW     Pain Location  abdomen;back  -MW     Pain Intervention(s)  Repositioned;Ambulation/increased activity;Rest  -     Row Name 04/02/21 1616          Plan of Care Review    Plan of Care Reviewed With  patient  -     Outcome Summary  Pt is a 61 yo female with h/o cirrhosis of liver d/t DUKES; gastroparesis; DM, seizure disorder; and portal HTN admitted with c/o increasing nausea and vomiting and progressive weakness and fatigue. She lives with spouse, uses rollator for mobility. She has a h/o falls and admits to not using rollator at all times but instead holds onto walls in home. She presents with impaired activity tolerance and generalized weakness. She was able to ambulate x 30 ft with RW, R knee buckling. Acute skilled PT indicated to address functional deficits and maximize level of independence prior to d/c home.  -     Row Name 04/02/21 1616          Therapy Assessment/Plan (PT)    Patient/Family Therapy Goals Statement (PT)  \"I feel so tired and weak.\"  -MW     Rehab Potential (PT)  good, to achieve stated therapy goals  -     Criteria for Skilled Interventions Met (PT)  yes;meets criteria;skilled treatment is necessary  -MW     Predicted Duration of Therapy Intervention (PT)  5 days  -     Row Name 04/02/21 1616          Vital Signs    Pre Systolic BP Rehab  138  -MW     Pre Treatment Diastolic BP  67  -MW     Pretreatment " Heart Rate (beats/min)  94  -MW     Posttreatment Heart Rate (beats/min)  94  -MW     Pre SpO2 (%)  94  -MW     O2 Delivery Pre Treatment  room air  -MW     O2 Delivery Intra Treatment  room air  -MW     Post SpO2 (%)  97  -MW     O2 Delivery Post Treatment  room air  -MW     Pre Patient Position  Supine  -MW     Intra Patient Position  Standing  -MW     Post Patient Position  Supine  -MW     Row Name 04/02/21 1616          Positioning and Restraints    Pre-Treatment Position  in bed  -MW     Post Treatment Position  bed  -MW     In Bed  notified nsg;supine;exit alarm on;encouraged to call for assist;call light within reach All lines intact  -MW       User Key  (r) = Recorded By, (t) = Taken By, (c) = Cosigned By    Initials Name Provider Type    Michelle Garcia PT Physical Therapist        Outcome Measures     Row Name 04/02/21 1623          How much help from another person do you currently need...    Turning from your back to your side while in flat bed without using bedrails?  4  -MW     Moving from lying on back to sitting on the side of a flat bed without bedrails?  4  -MW     Moving to and from a bed to a chair (including a wheelchair)?  3  -MW     Standing up from a chair using your arms (e.g., wheelchair, bedside chair)?  4  -MW     Climbing 3-5 steps with a railing?  2  -MW     To walk in hospital room?  3  -MW     AM-PAC 6 Clicks Score (PT)  20  -MW     Row Name 04/02/21 1623          Functional Assessment    Outcome Measure Options  AM-PAC 6 Clicks Basic Mobility (PT)  -MW       User Key  (r) = Recorded By, (t) = Taken By, (c) = Cosigned By    Initials Name Provider Type    Michelle Garcia PT Physical Therapist        Physical Therapy Education                 Title: PT OT SLP Therapies (Done)     Topic: Physical Therapy (Done)     Point: Mobility training (Done)     Learning Progress Summary           Patient Acceptance, E, VU,NR by LC at 4/2/2021 1624                   Point: Body mechanics  (Done)     Learning Progress Summary           Patient Acceptance, E, VU,NR by LC at 4/2/2021 1624                   Point: Precautions (Done)     Learning Progress Summary           Patient Acceptance, E, VU,NR by LC at 4/2/2021 1624                               User Key     Initials Effective Dates Name Provider Type Discipline     09/17/19 -  Michelle Dixon PT Physical Therapist PT              PT Recommendation and Plan  Planned Therapy Interventions (PT): balance training, bed mobility training, gait training, home exercise program, patient/family education, neuromuscular re-education, postural re-education, strengthening, transfer training  Plan of Care Reviewed With: patient  Outcome Summary: Pt is a 59 yo female with h/o cirrhosis of liver d/t DUKES; gastroparesis; DM, seizure disorder; and portal HTN admitted with c/o increasing nausea and vomiting and progressive weakness and fatigue. She lives with spouse, uses rollator for mobility. She has a h/o falls and admits to not using rollator at all times but instead holds onto walls in home. She presents with impaired activity tolerance and generalized weakness. She was able to ambulate x 30 ft with RW, R knee buckling. Acute skilled PT indicated to address functional deficits and maximize level of independence prior to d/c home.     Time Calculation:   PT Charges     Row Name 04/02/21 1625             Time Calculation    Start Time  1552  -MW      Stop Time  1605  -MW      Time Calculation (min)  13 min  -MW      PT Received On  04/02/21  -MW      PT - Next Appointment  04/03/21  -MW      PT Goal Re-Cert Due Date  04/09/21  -MW         Time Calculation- PT    Total Timed Code Minutes- PT  12 minute(s)  -        User Key  (r) = Recorded By, (t) = Taken By, (c) = Cosigned By    Initials Name Provider Type    Michelle Garcia, PT Physical Therapist        Therapy Charges for Today     Code Description Service Date Service Provider Modifiers Qty     95184207378 HC PT EVAL LOW COMPLEXITY 2 4/2/2021 Michelle Dixon, PT GP 1    61137283824 HC PT THER PROC EA 15 MIN 4/2/2021 Michelle Dixon, PT GP 1          PT G-Codes  Outcome Measure Options: AM-PAC 6 Clicks Basic Mobility (PT)  AM-PAC 6 Clicks Score (PT): 20    Michelle Dixon, PT  4/2/2021

## 2021-04-02 NOTE — PLAN OF CARE
Goal Outcome Evaluation:  Plan of Care Reviewed With: patient     Outcome Summary: Pt is a 61 yo female with h/o cirrhosis of liver d/t DUKES; gastroparesis; DM, seizure disorder; and portal HTN admitted with c/o increasing nausea and vomiting and progressive weakness and fatigue. She lives with spouse, uses rollator for mobility. She has a h/o falls and admits to not using rollator at all times but instead holds onto walls in home. She presents with impaired activity tolerance and generalized weakness. She was able to ambulate x 30 ft with RW, R knee buckling. Acute skilled PT indicated to address functional deficits and maximize level of independence prior to d/c home.  ..Patient was intermittently wearing a face mask during this therapy encounter. Therapist used appropriate personal protective equipment including eye protection, mask, and gloves.  Mask used was standard procedure mask. Appropriate PPE was worn during the entire therapy session. Hand hygiene was completed before and after therapy session. Patient is not in enhanced droplet precautions.

## 2021-04-02 NOTE — PLAN OF CARE
Pt medicated prn for abd/back pain, given ivf, will get consent for egd, given insulin with meals, vss, will ctm.

## 2021-04-02 NOTE — CONSULTS
"Diabetes Education  Assessment/Teaching    Patient Name:  Silvia Zabala  YOB: 1960  MRN: [id number]  Admit Date:  4/1/2021      Assessment Date:  4/2/2021    Most Recent Value   General Information    Referral From:  Blood glucose   Height  170.2 cm (67\")   Height Method  Stated   Weight  80.8 kg (178 lb 1.6 oz)   Weight Method  Bed scale   Diabetes History   What type of diabetes do you have?  Type 2   Length of Diabetes Diagnosis  10 + years [Pt states initially diagnosed in her 20s.  Pt states has been insulin for the past several years.]   Do you test your blood sugar at home?  yes   Meter type  One Touch   Education Preferences   Barriers to Learning  other (comment) [None noted]   Assessment Topics   Reducing Risk - Assessment  Needs education   DM Goals   Reducing Risk - Goal  30-90 days from discharge   Contact Plan  Follow-up medical care            Most Recent Value   DM Education Needs   Meter  Has own   Reducing Risks  A1C testing [a1c 8.9% Feb 2021]   Healthy Coping  Appropriate   Discharge Plan  Home, Follow-up with endocrinolgoist [Dr. Burnett]   Motivation  Engaged   Teaching Method  Discussion   Patient Response  Verbalized understanding          Other Comments:  Pt was a former patient of Dr. Burentt.  Pt was last seen by Dr. Burnett July 2019.  Pt is still taking the same doses prescribed by Dr. Burnett.  Pt states she titrates her Humalog based on BGM sliding scale as well as oral intake.  Pt states she has difficulty with oral intake and uses Boost as well.  Pt states she used to wear a [place] CGM and had success with that.      Encouraged follow up with Dr. Burnett, pt has an appointment at the end of this month.  Instructed to resume Roseline to give Dr. Burnett data for insulin dosage adjustments.  Pt verbalized understanding.      Electronically signed by:  Fabiana Bronson RN, [facility]  04/02/21 09:49 EDT  "

## 2021-04-02 NOTE — CONSULTS
[facility]  Initial Inpatient Consult Note    Referring Provider: Kulwant BRADFORD    Reason for Consultation: Gastroparesis, nausea with vomiting, WONG cirrhosis    Subjective     History of present illness:    60 y.o. female previously seen by Dr. Rich Coleman currently followed by Dr. Kimberly Gray with a history of Wong cirrhosis and esophageal varices status post TIPS procedure as well as banding of varices and diabetes mellitus with gastroparesis.  She recounts her last upper endoscopy was done at [facility] in [place] in October 2020, per her account study did not reveal varices or explain her episodes of nausea and vomiting at that time.  She has had issues with nausea and vomiting for the past 3 weeks.  No reported hematemesis no reported melena or hematochezia.  She denies anticoagulant therapy.  She has been unable to keep her medications down therefore unable to control her gastroparesis with metoclopramide.    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   • Cirrhosis (CMS/HCC)    • Clostridium difficile infection    • Colon polyps    • Depression    • Diabetes mellitus (CMS/HCC)     Adult onset, insulin requiring   • Duodenal ulcer with hemorrhage    • Esophageal varices determined by endoscopy (CMS/HCC)    • Fibromyalgia    • Gallbladder disease    • Gastric varices    • Gastroparesis    • Glaucoma    • Granuloma annulare    • H/O B12 deficiency    • H/O Bleeding ulcer     And gastroesophageal varices   • H/O mixed connective tissue disorder    • Hemorrhoids    • Hiatal hernia    • History of transfusion     needs bendryl  flushes sensation and temp goes up   • Hx of being hospitalized     In [place] for GI bleeding from  ulcer and varices   • Hyperlipidemia    • Migraine    • Mitral valve prolapse    • DUKES (nonalcoholic steatohepatitis) 11/2016   • Orthostatic hypotension 02/2019   • BRICE (obstructive sleep apnea)    • Pancreas divisum    • Pancytopenia (CMS/HCC)     Chronic, due to cirrhosis and hypersplenism   • Peptic ulcer disease     And esophageal varices   • PONV (postoperative nausea and vomiting)    • RA (rheumatoid arthritis) (CMS/HCC)    • Seizure disorder (CMS/HCC)     last 2003   • Seizures (CMS/HCC)    • Systemic lupus (CMS/HCC)    • TIA (transient ischemic attack) 1984     Past Surgical History:  Past Surgical History:   Procedure Laterality Date   • BARTHOLIN CYST MARSUPIALIZATION Left 1/18/2021    Procedure: EXCISION OF LABIAL CYST;  Surgeon: Melinda Thakkar MD;  Location: [facility] MAIN OR;  Service: Obstetrics/Gynecology;  Laterality: Left;   • BLADDER REPAIR  1991   • CATARACT EXTRACTION     • CHOLECYSTECTOMY  1994   • COLONOSCOPY     • ENDOSCOPY N/A 11/7/2016    Procedure: ESOPHAGOGASTRODUODENOSCOPY WITH COLD POLYPECTOMY, BANDING,  AND CLIPS TIMES 2;  Surgeon: Rich Coleman MD;  Location: [facility] ENDOSCOPY;  Service:    • ENDOSCOPY N/A 12/22/2016    Procedure: ESOPHAGOGASTRODUODENOSCOPY WITH ESOPHAGEAL BANDING. 5 BANDS FIRED, 4 BANDS ADHERERD TO MUCOSA;  Surgeon: Rich Coleman MD;  Location: [facility] ENDOSCOPY;  Service:    • ENDOSCOPY N/A 2/15/2017    Procedure: ESOPHAGOGASTRODUODENOSCOPY AT BEDSIDE with esophageal varices banding (3);  Surgeon: Rich Coleman MD;  Location: [facility] ENDOSCOPY;  Service:    • ENDOSCOPY N/A 4/6/2017    Procedure: ESOPHAGOGASTRODUODENOSCOPY WITH ESOPHAGEAL VARICES BANDING x2;  Surgeon: Rich Coleman MD;  Location: [facility] ENDOSCOPY;  Service:    • ENDOSCOPY N/A 11/24/2017    Procedure: ESOPHAGOGASTRODUODENOSCOPY with biopsies;  Surgeon: Rich Coleman MD;  Location: [facility] ENDOSCOPY;  Service:    • ENDOSCOPY N/A 10/5/2018    Procedure: ESOPHAGOGASTRODUODENOSCOPY;   "Surgeon: Rich Coleman MD;  Location: [facility] ENDOSCOPY;  Service: Gastroenterology   • ENDOSCOPY N/A 5/10/2019    Procedure: ESOPHAGOGASTRODUODENOSCOPY AT BEDSIDE WITH VARICEAL LIGATION;  Surgeon: Rich Coleman MD;  Location: [facility] ENDOSCOPY;  Service: Gastroenterology   • ENDOSCOPY N/A 2019    Procedure: ESOPHAGOGASTRODUODENOSCOPY WITH ESOPHAGEAL BANDING (3 BANDS);  Surgeon: Rich Coleman MD;  Location: [facility] ENDOSCOPY;  Service: Gastroenterology   • ENDOSCOPY AND COLONOSCOPY      Dr. Rich Coleman with findings of candida esophagitis   • EYE SURGERY     • HYSTERECTOMY  1986    partial   • JOINT REPLACEMENT     • KNEE SURGERY Right     \"right knee recontstruction\"   • LIVER BIOPSY  2015   • MASS EXCISION     • STOMACH SURGERY     • TIPS PROCEDURE  2020    x2      Social History:   Social History     Tobacco Use   • Smoking status: Former Smoker     Packs/day: 1.00     Years: 4.00     Pack years: 4.00     Types: Cigarettes     Quit date: 2015     Years since quittin.2   • Smokeless tobacco: Never Used   Substance Use Topics   • Alcohol use: No      Family History:  Family History   Problem Relation Age of Onset   • Liver disease Other    • Depression Other    • Heart disease Other    • Hypertension Other    • Diabetes Other    • Breast cancer Other    • Brain cancer Other    • Uterine cancer Other    • Colon cancer Other    • Leukemia Other    • Colon cancer Maternal Aunt    • Hypertension Mother    • Diabetes type II Mother    • Rheum arthritis Mother    • Liver disease Mother         DUKES   • Heart disease Father    • Diabetes type II Father    • Diabetes type II Sister    • Lupus Sister    • Malig Hyperthermia Neg Hx        Home Meds:  Medications Prior to Admission   Medication Sig Dispense Refill Last Dose   • ALPRAZolam (XANAX) 0.5 MG tablet Take 1 tablet by mouth 2 (Two) Times a Day As Needed for Anxiety or Sleep. 60 tablet 0    • Cholecalciferol (Vitamin D3) 25 MCG " (1000 UT) capsule Take 1,000 Units by mouth Daily.      • cholestyramine light 4 g powder Take 1 packet by mouth 2 (Two) Times a Day. (Patient taking differently: Take 8 g by mouth 2 (Two) Times a Day.) 60 packet 5    • Continuous Blood Gluc Sensor (FreeStyle Roseline Sensor System) 1 Device Every 14 (Fourteen) Days. 2 each 3    • Docusate Sodium (COLACE PO) Take 2 capsules by mouth every night at bedtime.      • ergocalciferol (ERGOCALCIFEROL) 1.25 MG (40277 UT) capsule Vitamin D2 1,250 mcg (50,000 unit) capsule      • esomeprazole (nexIUM) 40 MG capsule Take 1 capsule by mouth Every Morning Before Breakfast. 90 capsule 1    • ferrous sulfate 325 (65 FE) MG tablet TAKE ONE TABLET BY MOUTH TWICE A DAY (Patient taking differently: Take 325 mg by mouth 2 (two) times a day.) 60 tablet 2    • FLUoxetine (PROzac) 10 MG capsule Take 10 mg by mouth Daily.      • furosemide (LASIX) 80 MG tablet Take 80 mg by mouth Daily As Needed (swelling).      • glucose blood test strip by Other route As Needed.      • hydrOXYzine (ATARAX) 25 MG tablet Take 2 tablets by mouth Every Night. 60 tablet 3    • Insulin Glargine (LANTUS SOLOSTAR) 100 UNIT/ML injection pen Inject 80 Units under the skin into the appropriate area as directed 2 (Two) Times a Day. 24 pen 1    • Insulin Lispro, 1 Unit Dial, (HumaLOG KwikPen) 100 UNIT/ML solution pen-injector INJECT UP TO 60 UNITS THREE TIMES A DAY WITH  MEALS 30 mL 0    • levETIRAcetam (KEPPRA) 500 MG tablet TAKE ONE TABLET BY MOUTH TWICE A  tablet 3    • Magnesium Oxide 400 MG capsule Take 400 mg by mouth Every Night. 90 each 1    • metoclopramide (REGLAN) 10 MG tablet Take 1 tablet by mouth 4 (Four) Times a Day Before Meals & at Bedtime. 120 tablet 1    • mirtazapine (REMERON) 15 MG tablet Take 15 mg by mouth Every Night.      • Multiple Vitamins-Minerals (MULTIVITAMIN WITH MINERALS) tablet tablet Take 1 tablet by mouth Daily.      • OxyCODONE HCl (OXAYDO) 7.5 MG tablet  Take 7.5 mg by mouth  Every 8 (Eight) Hours As Needed.      • polyethylene glycol (MIRALAX) powder Take 17 g by mouth Daily As Needed.      • Prasterone (Intrarosa) 6.5 MG insert Insert 1 each into the vagina 2 (Two) Times a Week.      • pregabalin (LYRICA) 75 MG capsule Take 75 mg by mouth 2 (Two) Times a Day.      • promethazine (PHENERGAN) 25 MG tablet Take 25 mg by mouth Every 8 (Eight) Hours As Needed for Nausea or Vomiting.      • riFAXIMin (Xifaxan) 550 MG tablet Take 1 tablet by mouth 2 (Two) Times a Day. 180 tablet 1    • spironolactone (ALDACTONE) 100 MG tablet TAKE ONE TABLET BY MOUTH DAILY (Patient taking differently: Take 100 mg by mouth Daily As Needed (swelling).) 30 tablet 2    • sucralfate (CARAFATE) 1 GM/10ML suspension Take 1 g by mouth 3 (Three) Times a Day.      • vitamin B-12 (CYANOCOBALAMIN) 1000 MCG tablet Take 1,000 mcg by mouth Daily.      • lubiprostone (Amitiza) 8 MCG capsule Take 1 capsule by mouth 2 (Two) Times a Day With Meals. 180 capsule 1      Current Meds:   cholestyramine light, 8 g, Oral, BID  docusate sodium, 100 mg, Oral, Daily  ferrous sulfate, 325 mg, Oral, Daily With Breakfast  FLUoxetine, 10 mg, Oral, Daily  hydrOXYzine, 50 mg, Oral, Nightly  insulin glargine, 80 Units, Subcutaneous, BID  insulin lispro, 0-9 Units, Subcutaneous, TID AC  insulin lispro, 60 Units, Subcutaneous, TID AC  levETIRAcetam, 500 mg, Oral, BID  magnesium oxide, 400 mg, Oral, Daily  metoclopramide, 10 mg, Oral, 4x Daily AC & at Bedtime  mirtazapine, 15 mg, Oral, Nightly  multivitamin with minerals, 1 tablet, Oral, Daily  pantoprazole, 40 mg, Oral, QAM  pregabalin, 75 mg, Oral, Q12H  riFAXIMin, 550 mg, Oral, BID  sodium chloride, 10 mL, Intravenous, Q12H  sucralfate, 1 g, Oral, 4x Daily AC & at Bedtime  vitamin B-12, 1,000 mcg, Oral, Daily  vitamin D, 50,000 Units, Oral, Q7 Days      Allergies:  Allergies   Allergen Reactions   • Albuterol Anaphylaxis   • Imitrex [Sumatriptan] Anaphylaxis   • Tramadol Nausea Only, Other (See  "Comments) and GI Intolerance     Per pt causes her \"palsy, meaning shaking and tremors\"    • Nsaids Other (See Comments)     Told by MD not to take due to liver problems   • Tylenol [Acetaminophen] Other (See Comments)     Has liver problems and told by MD to not take   • Zofran [Ondansetron Hcl] Other (See Comments)     Pt states does not work to correct nausea and vomiting.    • Lactulose Nausea And Vomiting and Other (See Comments)     Severe abdominal pain   • Quinine Derivatives Nausea And Vomiting     Review of Systems  Pertinent items are noted in HPI, all other systems reviewed and negative     Objective     Vital Signs  Temp:  [97.3 °F (36.3 °C)-98.4 °F (36.9 °C)] 98 °F (36.7 °C)  Heart Rate:  [] 97  Resp:  [16-18] 18  BP: (124-154)/(62-84) 138/67  Physical Exam:  General Appearance:    Alert, cooperative, in no acute distress   Head:    Normocephalic, without obvious abnormality, atraumatic   Eyes:          conjunctivae and sclerae normal, no   icterus   Throat:   no thrush, oral mucosa moist   Neck:   Supple, no adenopathy   Lungs:     Clear to auscultation bilaterally    Heart:    Regular rhythm and normal rate    Chest Wall:    No abnormalities observed   Abdomen:     Soft, nondistended, nontender; normal bowel sounds   Extremities:   no edema, no redness   Skin:   No bruising or rash   Psychiatric:  normal mood and insight     Results Review:   I reviewed the patient's new clinical results.    Results from last 7 days   Lab Units 04/02/21  0525 04/01/21  1714   WBC 10*3/mm3 4.08 4.71   HEMOGLOBIN g/dL 11.2* 13.3   HEMATOCRIT % 32.3* 39.2   PLATELETS 10*3/mm3 95* 107*     Results from last 7 days   Lab Units 04/02/21  0525 04/01/21  1714   SODIUM mmol/L 132* 130*   POTASSIUM mmol/L 4.3 4.1   CHLORIDE mmol/L 101 94*   CO2 mmol/L 21.4* 21.6*   BUN mg/dL 20 23*   CREATININE mg/dL 0.82 1.05*   CALCIUM mg/dL 8.5* 9.6   BILIRUBIN mg/dL 1.4* 2.2*   ALK PHOS U/L 141* 180*   ALT (SGPT) U/L 23 29   AST " (SGOT) U/L 24 28   GLUCOSE mg/dL 367* 606*     Results from last 7 days   Lab Units 04/01/21  1714   INR  1.33*     Lab Results   Lab Value Date/Time    LIPASE 11 (L) 04/01/2021 1714    LIPASE 14 02/03/2021 1139    LIPASE 8 (L) 05/09/2019 2043    LIPASE 9 (L) 01/17/2019 1259    LIPASE 8 (L) 12/13/2018 1900    LIPASE 12 (L) 10/02/2018 2332    LIPASE 9 (L) 05/18/2018 0425    LIPASE 22 12/16/2017 1650    LIPASE 10 (L) 10/30/2017 1732    LIPASE 9 (L) 02/21/2017 1202    LIPASE 16 11/10/2014 0724       Radiology:  XR Abdomen 2+ VW with Chest 1 VW   Final Result          Assessment/Plan   Patient Active Problem List   Diagnosis   • Cirrhosis of liver (CMS/HCC)   • Rheumatoid arthritis (CMS/HCC)   • Anxiety and depression   • Type 2 diabetes mellitus, uncontrolled, with neuropathy (CMS/HCC)   • Fibrositis   • Change in blood platelet count   • Pancytopenia (CMS/HCC)   • Hypersplenism   • Nausea & vomiting   • DUKES (nonalcoholic steatohepatitis)   • Hyperlipidemia   • Abdominal pain   • Hematemesis   • Ascites   • Portal hypertension (CMS/HCC)   • Systemic lupus (CMS/HCC)   • Secondary esophageal varices without bleeding (CMS/HCC)   • Esophageal varices with bleeding (CMS/HCC)   • Gastroparesis   • 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 (CMS/HCC)   • Diabetic ketoacidosis without coma associated with type 2 diabetes mellitus (CMS/HCC)   • Diabetic peripheral neuropathy (CMS/HCC)   • History of seizure disorder   • Hepatic encephalopathy (CMS/HCC)   • Thrombocytopenia (CMS/HCC)   • Fever   • Acute ITP (CMS/HCC)   • Degeneration of lumbosacral intervertebral disc   • Seizure (CMS/HCC)   • Spondylosis without myelopathy   • Intractable vomiting with nausea   • UGI bleed   • Migraine without aura   • Urinary retention   • Uncontrolled diabetes mellitus with hyperglycemia (CMS/HCC)   • BRICE (obstructive sleep apnea)   • Gastroparesis   • Hyperammonemia (CMS/HCC)   • Hyponatremia   • Anemia   • Vitamin D insufficiency   • Hyperglycemia    • Dehydration   • Iron deficiency anemia   • Adverse effect of iron or its compound, subsequent encounter   • Hemorrhage of anus and rectum   • Vulvar lesion   • Acute upper GI bleed   • Acute blood loss anemia   • Acute hyperkalemia   • Labial cyst   • Transaminitis   • Hyperbilirubinemia   • Acute gastritis   • UTI (urinary tract infection), bacterial   • UTI (urinary tract infection)   • Cholestatic pruritus   • Fatty liver disease, nonalcoholic   • Protein-calorie malnutrition, moderate (CMS/HCC)   • Elevated procalcitonin       Assessment:  1. Nausea with vomiting: Known gastroparesis, cannot rule out outlet obstruction or exacerbation of her delayed emptying.  2. DUKES cirrhosis: History of varices but status post TIPS placement no further varices seen.  3. Diabetes mellitus  4. Seizure disorder  5. Morbid obesity  6. Sleep apnea    Plan:  · Anticipate EGD tomorrow  · Can administer IV metoclopramide and PPI  · Further recommendations to follow endoscopic assessment      I discussed the patients findings and my recommendations with patient.    Logan Gomez MD

## 2021-04-02 NOTE — PROGRESS NOTES
Name: Silvia Zabala ADMIT: 2021   : 1960  PCP: Kulwant Martínez APRN    MRN: [id number] LOS: 0 days   AGE/SEX: 60 y.o. female  ROOM: [facility]     Subjective   Subjective   Denies any weakness.  She does report some nausea.  She stated that she was dehydrated, urine was dark.    Review of Systems   Constitutional: Negative for fever.   Respiratory: Negative for cough and shortness of breath.    Cardiovascular: Negative for chest pain.   Gastrointestinal: Positive for nausea.      Objective   Objective   Vital Signs  Temp:  [97.3 °F (36.3 °C)-98.4 °F (36.9 °C)] 98.2 °F (36.8 °C)  Heart Rate:  [] 81  Resp:  [16-18] 18  BP: (124-154)/(62-84) 131/62  SpO2:  [91 %-98 %] 91 %  on   ;   Device (Oxygen Therapy): room air  Body mass index is 27.89 kg/m².    Physical Exam  Vitals and nursing note reviewed.   Constitutional:       General: She is not in acute distress.  HENT:      Head: Normocephalic and atraumatic.   Cardiovascular:      Rate and Rhythm: Normal rate and regular rhythm.   Pulmonary:      Effort: Pulmonary effort is normal. No respiratory distress.      Breath sounds: Normal breath sounds.   Abdominal:      General: Bowel sounds are normal.      Palpations: Abdomen is soft.      Tenderness: There is no abdominal tenderness. There is no guarding or rebound.   Musculoskeletal:         General: No swelling.   Skin:     General: Skin is warm and dry.   Neurological:      General: No focal deficit present.      Mental Status: She is alert and oriented to person, place, and time.   Psychiatric:         Mood and Affect: Mood normal.         Behavior: Behavior normal.     Results Review  I reviewed the patient's new clinical results.  Results from last 7 days   Lab Units 21  0525 21  1714   WBC 10*3/mm3 4.08 4.71   HEMOGLOBIN g/dL 11.2* 13.3   PLATELETS 10*3/mm3 95* 107*     Results from last 7 days   Lab Units 21  0525 21  1714   SODIUM mmol/L 132* 130*   POTASSIUM mmol/L 4.3  4.1   CHLORIDE mmol/L 101 94*   CO2 mmol/L 21.4* 21.6*   BUN mg/dL 20 23*   CREATININE mg/dL 0.82 1.05*   GLUCOSE mg/dL 367* 606*     Estimated Creatinine Clearance: 81.8 mL/min (by C-G formula based on SCr of 0.82 mg/dL).    Results from last 7 days   Lab Units 04/02/21  0525 04/01/21  1714   ALBUMIN g/dL 3.20* 3.90   BILIRUBIN mg/dL 1.4* 2.2*   ALK PHOS U/L 141* 180*   AST (SGOT) U/L 24 28   ALT (SGPT) U/L 23 29     Results from last 7 days   Lab Units 04/02/21  0525 04/01/21  1714   CALCIUM mg/dL 8.5* 9.6   ALBUMIN g/dL 3.20* 3.90   MAGNESIUM mg/dL  --  1.7     Results from last 7 days   Lab Units 04/02/21  0525 04/01/21 2041 04/01/21  1735 04/01/21  1714   PROCALCITONIN ng/mL  --   --   --  0.34*   LACTATE mmol/L 1.3 2.7* 3.3*  --      Glucose   Date/Time Value Ref Range Status   04/02/2021 1103 389 (H) 70 - 130 mg/dL Final   04/02/2021 1101 392 (H) 70 - 130 mg/dL Final   04/02/2021 0700 355 (H) 70 - 130 mg/dL Final   04/01/2021 2041 357 (H) 70 - 130 mg/dL Final   04/01/2021 1903 445 (C) 70 - 130 mg/dL Final     Scheduled Meds  cholestyramine light, 8 g, Oral, BID  docusate sodium, 100 mg, Oral, Daily  ferrous sulfate, 325 mg, Oral, Daily With Breakfast  FLUoxetine, 10 mg, Oral, Daily  hydrOXYzine, 50 mg, Oral, Nightly  insulin glargine, 80 Units, Subcutaneous, BID  insulin lispro, 0-9 Units, Subcutaneous, TID AC  insulin lispro, 60 Units, Subcutaneous, TID AC  levETIRAcetam, 500 mg, Oral, BID  magnesium oxide, 400 mg, Oral, Daily  metoclopramide, 10 mg, Oral, 4x Daily AC & at Bedtime  mirtazapine, 15 mg, Oral, Nightly  multivitamin with minerals, 1 tablet, Oral, Daily  pantoprazole, 40 mg, Oral, QAM  pregabalin, 75 mg, Oral, Q12H  riFAXIMin, 550 mg, Oral, BID  sodium chloride, 10 mL, Intravenous, Q12H  sucralfate, 1 g, Oral, 4x Daily AC & at Bedtime  vitamin B-12, 1,000 mcg, Oral, Daily  vitamin D, 50,000 Units, Oral, Q7 Days    Continuous Infusions  sodium chloride, 125 mL/hr, Last Rate: 125 mL/hr (04/02/21  1209)    PRN Meds  •  ALPRAZolam  •  dextrose  •  dextrose  •  glucagon (human recombinant)  •  nitroglycerin  •  oxyCODONE  •  [COMPLETED] Insert peripheral IV **AND** sodium chloride  •  sodium chloride    sodium chloride, 125 mL/hr, Last Rate: 125 mL/hr (04/02/21 1209)    Diet  Diet Regular; Cardiac, Consistent Carbohydrate, Renal     Assessment/Plan     Active Hospital Problems    Diagnosis  POA   • **Uncontrolled diabetes mellitus with hyperglycemia (CMS/HCC) [E11.65]  Yes   • Elevated procalcitonin [R79.89]  Unknown   • Dehydration [E86.0]  Yes   • BRICE (obstructive sleep apnea) [G47.33]  Yes   • Intractable vomiting with nausea [R11.2]  Yes   • 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     60 y.o. female admitted with weakness and uncontrolled diabetes.    · Diabetes with hyperglycemia, uncontrolled  · Improved some but not ideal  · Discussed with diabetic nurse educator  · She had a continuous glucose monitor but switched to fingerstick due to some issue with supply  · She has ollow-up with Dr. Burnett April 22  · Dehydration due to hyperglycemia, GI losses  · Improved with fluids.  · Lactate improved   · Nausea and vomiting  · She is a history of gastroparesis followed by GI  · She is on scheduled Reglan  ·   · DUKES, cirrhosis  · Likely can resume diuretics soon  · Rifaximin  · Elevated procalcitonin  · Patient without fever, leukocytosis  · Lactate elevation improved with hydration  · Chest x-ray without pneumonia  · Will obtain CT abdomen/pelvis and ask ID to see  · SCDs for DVT prophylaxis  · Full code  · Discussed with patient, nursing staff, CCP and care team on multidisciplinary rounds  · Anticipate discharge home in 1-2 days.    Dontae Brower MD  [facility]  04/02/21  13:14 EDT    I wore protective equipment throughout this patient encounter  including a face mask, gloves, and protective eyewear.  Hand hygiene was performed before donning protective equipment and after removal when leaving the room.

## 2021-04-02 NOTE — H&P
Internal medicine history and physical  INTERNAL MEDICINE   [facility]       Patient Identification:  Name: Silvia Zabala  Age: 60 y.o.  Sex: female  :  1960  MRN: [id number]                   Primary Care Physician: Kulwant Martínez APRN                               Date of admission:2021    Chief Complaint: Progressive weakness fatigue not eating well and associated nausea and vomiting for the last 3 weeks.    History of Present Illness:   Patient is a 60-year-old female who has history of cirrhosis of the liver due to Wong, gastroparesis, diabetes mellitus which is poorly controlled as well as history of portal hypertension with TIPS procedure as well as esophageal variceal banding was in her usual state of her health until 3 weeks ago when she started having increasing nausea vomiting unable to eat or drink much and was getting progressively weak and fatigued.  She resisted the idea of her  to go to the emergency room or seek help until today when she finally gave into the suggestion and came to the emergency room with long list of complaints as summarized above.  In the emergency room she was found to have blood sugar of greater than 600, elevated lactic acid level.  Patient was given IV fluid bolus empiric antibiotic therapy after blood cultures were drawn and given insulin and is being admitted for further care.  Patient is currently feeling better.  Patient also complaining of some constipation in the last few days.      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   • Cirrhosis (CMS/HCC)    • Clostridium difficile infection    • Colon polyps    • Depression    • Diabetes mellitus (CMS/HCC)     Adult onset, insulin requiring   •  Duodenal ulcer with hemorrhage    • Esophageal varices determined by endoscopy (CMS/HCC)    • Fibromyalgia    • Gallbladder disease    • Gastric varices    • Gastroparesis    • Glaucoma    • Granuloma annulare    • H/O B12 deficiency    • H/O Bleeding ulcer     And gastroesophageal varices   • H/O mixed connective tissue disorder    • Hemorrhoids    • Hiatal hernia    • History of transfusion     needs bendryl  flushes sensation and temp goes up   • Hx of being hospitalized     In [place] for GI bleeding from ulcer and varices   • Hyperlipidemia    • Migraine    • Mitral valve prolapse    • DUKES (nonalcoholic steatohepatitis) 11/2016   • Orthostatic hypotension 02/2019   • BRICE (obstructive sleep apnea)    • Pancreas divisum    • Pancytopenia (CMS/HCC)     Chronic, due to cirrhosis and hypersplenism   • Peptic ulcer disease     And esophageal varices   • PONV (postoperative nausea and vomiting)    • RA (rheumatoid arthritis) (CMS/HCC)    • Seizure disorder (CMS/HCC)     last 2003   • Seizures (CMS/HCC)    • Systemic lupus (CMS/HCC)    • TIA (transient ischemic attack) 1984     Past Surgical History:  Past Surgical History:   Procedure Laterality Date   • BARTHOLIN CYST MARSUPIALIZATION Left 1/18/2021    Procedure: EXCISION OF LABIAL CYST;  Surgeon: Melinda Thakkar MD;  Location: [facility] MAIN OR;  Service: Obstetrics/Gynecology;  Laterality: Left;   • BLADDER REPAIR  1991   • CATARACT EXTRACTION     • CHOLECYSTECTOMY  1994   • COLONOSCOPY     • ENDOSCOPY N/A 11/7/2016    Procedure: ESOPHAGOGASTRODUODENOSCOPY WITH COLD POLYPECTOMY, BANDING,  AND CLIPS TIMES 2;  Surgeon: Rich Coleman MD;  Location: [facility] ENDOSCOPY;  Service:    • ENDOSCOPY N/A 12/22/2016    Procedure: ESOPHAGOGASTRODUODENOSCOPY WITH ESOPHAGEAL BANDING. 5 BANDS FIRED, 4 BANDS ADHERERD TO MUCOSA;  Surgeon: Rich Coleman MD;  Location: [facility] ENDOSCOPY;  Service:    • ENDOSCOPY N/A 2/15/2017    Procedure: ESOPHAGOGASTRODUODENOSCOPY AT BEDSIDE with  "esophageal varices banding (3);  Surgeon: Rich Coleman MD;  Location: [facility] ENDOSCOPY;  Service:    • ENDOSCOPY N/A 4/6/2017    Procedure: ESOPHAGOGASTRODUODENOSCOPY WITH ESOPHAGEAL VARICES BANDING x2;  Surgeon: Rich Coleman MD;  Location: [facility] ENDOSCOPY;  Service:    • ENDOSCOPY N/A 11/24/2017    Procedure: ESOPHAGOGASTRODUODENOSCOPY with biopsies;  Surgeon: Rich Coleman MD;  Location[facility] ENDOSCOPY;  Service:    • ENDOSCOPY N/A 10/5/2018    Procedure: ESOPHAGOGASTRODUODENOSCOPY;  Surgeon: Rich Coleman MD;  Location: [facility] ENDOSCOPY;  Service: Gastroenterology   • ENDOSCOPY N/A 5/10/2019    Procedure: ESOPHAGOGASTRODUODENOSCOPY AT BEDSIDE WITH VARICEAL LIGATION;  Surgeon: Rich Coleman MD;  Location: [facility] ENDOSCOPY;  Service: Gastroenterology   • ENDOSCOPY N/A 6/28/2019    Procedure: ESOPHAGOGASTRODUODENOSCOPY WITH ESOPHAGEAL BANDING (3 BANDS);  Surgeon: Rich Coleman MD;  Location: [facility] ENDOSCOPY;  Service: Gastroenterology   • ENDOSCOPY AND COLONOSCOPY  2014    Dr. Rich Coleman with findings of candida esophagitis   • EYE SURGERY     • HYSTERECTOMY  1986    partial   • JOINT REPLACEMENT     • KNEE SURGERY Right 1995    \"right knee recontstruction\"   • LIVER BIOPSY  01/2015   • MASS EXCISION     • STOMACH SURGERY     • TIPS PROCEDURE  2020    x2      Home Meds:  Medications Prior to Admission   Medication Sig Dispense Refill Last Dose   • ALPRAZolam (XANAX) 0.5 MG tablet Take 1 tablet by mouth 2 (Two) Times a Day As Needed for Anxiety or Sleep. 60 tablet 0    • Cholecalciferol (Vitamin D3) 25 MCG (1000 UT) capsule Take 1,000 Units by mouth Daily.      • cholestyramine light 4 g powder Take 1 packet by mouth 2 (Two) Times a Day. (Patient taking differently: Take 8 g by mouth 2 (Two) Times a Day.) 60 packet 5    • Continuous Blood Gluc Sensor (FreeStyle Roseline Sensor System) 1 Device Every 14 (Fourteen) Days. 2 each 3    • Docusate Sodium (COLACE PO) Take 2 capsules by mouth " every night at bedtime.      • ergocalciferol (ERGOCALCIFEROL) 1.25 MG (98676 UT) capsule Vitamin D2 1,250 mcg (50,000 unit) capsule      • esomeprazole (nexIUM) 40 MG capsule Take 1 capsule by mouth Every Morning Before Breakfast. 90 capsule 1    • ferrous sulfate 325 (65 FE) MG tablet TAKE ONE TABLET BY MOUTH TWICE A DAY (Patient taking differently: Take 325 mg by mouth 2 (two) times a day.) 60 tablet 2    • furosemide (LASIX) 80 MG tablet Take 80 mg by mouth Daily As Needed (swelling).      • glucose blood test strip by Other route As Needed.      • hydrOXYzine (ATARAX) 25 MG tablet Take 2 tablets by mouth Every Night. 60 tablet 3    • Insulin Glargine (LANTUS SOLOSTAR) 100 UNIT/ML injection pen Inject 80 Units under the skin into the appropriate area as directed 2 (Two) Times a Day. 24 pen 1    • Insulin Lispro, 1 Unit Dial, (HumaLOG KwikPen) 100 UNIT/ML solution pen-injector INJECT UP TO 60 UNITS THREE TIMES A DAY WITH  MEALS 30 mL 0    • levETIRAcetam (KEPPRA) 500 MG tablet TAKE ONE TABLET BY MOUTH TWICE A  tablet 3    • Magnesium Oxide 400 MG capsule Take 400 mg by mouth Every Night. 90 each 1    • metoclopramide (REGLAN) 10 MG tablet Take 1 tablet by mouth 4 (Four) Times a Day Before Meals & at Bedtime. 120 tablet 1    • mirtazapine (REMERON) 15 MG tablet Take 15 mg by mouth Every Night.      • Multiple Vitamins-Minerals (MULTIVITAMIN WITH MINERALS) tablet tablet Take 1 tablet by mouth Daily.      • OxyCODONE HCl (OXAYDO) 7.5 MG tablet  Take 7.5 mg by mouth Every 8 (Eight) Hours As Needed.      • polyethylene glycol (MIRALAX) powder Take 17 g by mouth Daily As Needed.      • Prasterone (Intrarosa) 6.5 MG insert Insert 1 each into the vagina 2 (Two) Times a Week.      • pregabalin (LYRICA) 75 MG capsule Take 75 mg by mouth 2 (Two) Times a Day.      • riFAXIMin (Xifaxan) 550 MG tablet Take 1 tablet by mouth 2 (Two) Times a Day. 180 tablet 1    • spironolactone (ALDACTONE) 100 MG tablet TAKE ONE TABLET  "BY MOUTH DAILY (Patient taking differently: Take 100 mg by mouth Daily As Needed (swelling).) 30 tablet 2    • sucralfate (CARAFATE) 1 GM/10ML suspension Take 1 g by mouth 3 (Three) Times a Day.      • vitamin B-12 (CYANOCOBALAMIN) 1000 MCG tablet Take 1,000 mcg by mouth Daily.      • lubiprostone (Amitiza) 8 MCG capsule Take 1 capsule by mouth 2 (Two) Times a Day With Meals. 180 capsule 1      Current Meds:     Current Facility-Administered Medications:   •  [COMPLETED] Insert peripheral IV, , , Once **AND** sodium chloride 0.9 % flush 10 mL, 10 mL, Intravenous, PRN, Anisha Malagon, JOEL  Allergies:  Allergies   Allergen Reactions   • Albuterol Anaphylaxis   • Imitrex [Sumatriptan] Anaphylaxis   • Tramadol Nausea Only, Other (See Comments) and GI Intolerance     Per pt causes her \"palsy, meaning shaking and tremors\"    • Nsaids Other (See Comments)     Told by MD not to take due to liver problems   • Tylenol [Acetaminophen] Other (See Comments)     Has liver problems and told by MD to not take   • Zofran [Ondansetron Hcl] Other (See Comments)     Pt states does not work to correct nausea and vomiting.    • Lactulose Nausea And Vomiting and Other (See Comments)     Severe abdominal pain   • Quinine Derivatives Nausea And Vomiting     Social History:   Social History     Tobacco Use   • Smoking status: Former Smoker     Packs/day: 1.00     Years: 4.00     Pack years: 4.00     Types: Cigarettes     Quit date: 2015     Years since quittin.2   • Smokeless tobacco: Never Used   Substance Use Topics   • Alcohol use: No      Family History:  Family History   Problem Relation Age of Onset   • Liver disease Other    • Depression Other    • Heart disease Other    • Hypertension Other    • Diabetes Other    • Breast cancer Other    • Brain cancer Other    • Uterine cancer Other    • Colon cancer Other    • Leukemia Other    • Colon cancer Maternal Aunt    • Hypertension Mother    • Diabetes type II Mother    • " "Rheum arthritis Mother    • Liver disease Mother         DUKES   • Heart disease Father    • Diabetes type II Father    • Diabetes type II Sister    • Lupus Sister    • Malig Hyperthermia Neg Hx           Review of Systems  See history of present illness and past medical history.    Constitutional: Remarkable for no fever or chills, does have generalized weakness and fatigue.  Cardiovascular: Remarkable for no chest pain orthopnea or PND  Respiratory: Remarkable for no cough or congestion  GI: Remarkable for nausea constipation vomiting decreased appetite  : Remarkable for no burning in urination frequency or urgency  Musculoskeletal: Remarkable for generalized body aches  Neurological: Remarkable for no loss of consciousness or continence.    Remainder of ROS is negative.      Vitals:   /68 (BP Location: Left arm, Patient Position: Lying)   Pulse 87   Temp 98.1 °F (36.7 °C) (Oral)   Resp 18   Ht 170.2 cm (67\")   Wt 80.8 kg (178 lb 1.6 oz)   LMP  (LMP Unknown)   SpO2 94%   BMI 27.89 kg/m²   I/O:     Intake/Output Summary (Last 24 hours) at 4/1/2021 2044  Last data filed at 4/1/2021 1903  Gross per 24 hour   Intake 50 ml   Output 250 ml   Net -200 ml     Exam:  Patient is examined using the personal protective equipment as per guidelines from infection control for this particular patient as enacted.  Hand washing was performed before and after patient interaction.  General Appearance:    Alert, cooperative, no distress, appears stated age   Head:    Normocephalic, without obvious abnormality, atraumatic   Eyes:    PERRL, conjunctiva/corneas clear, EOM's intact, both eyes   Ears:    Normal external ear canals, both ears   Nose:   Nares normal, septum midline, mucosa normal, no drainage    or sinus tenderness   Throat:   Lips, tongue, gums normal; oral mucosa pink and moist   Neck:   Supple, symmetrical, trachea midline, no adenopathy;     thyroid:  no enlargement/tenderness/nodules; no carotid    bruit " or JVD   Back:     Symmetric, no curvature, ROM normal, no CVA tenderness   Lungs:     Clear to auscultation bilaterally, respirations unlabored   Chest Wall:    No tenderness or deformity    Heart:    Regular rate and rhythm, S1 and S2 normal, no murmur, rub   or gallop   Abdomen:    Obese soft nontender no significant ascites noted no guarding rigidity or rebound noted   Extremities:  Trace edema   Pulses:   Pulses palpable in all extremities; symmetric all extremities   Skin:   Skin color normal, Skin is warm and dry,  no rashes or palpable lesions   Neurologic:  Alert oriented in no acute distress grossly nonfocal examination.       Data Review:      I reviewed the patient's new clinical results.  Results from last 7 days   Lab Units 04/01/21  1714   WBC 10*3/mm3 4.71   HEMOGLOBIN g/dL 13.3   PLATELETS 10*3/mm3 107*     Results from last 7 days   Lab Units 04/01/21  1714   SODIUM mmol/L 130*   POTASSIUM mmol/L 4.1   CHLORIDE mmol/L 94*   CO2 mmol/L 21.6*   BUN mg/dL 23*   CREATININE mg/dL 1.05*   CALCIUM mg/dL 9.6   GLUCOSE mg/dL 606*     Microbiology Results (last 10 days)     Procedure Component Value - Date/Time    COVID PRE-OP / PRE-PROCEDURE SCREENING ORDER (NO ISOLATION) - Swab, Nasopharynx [[id number]]  (Normal) Collected: 04/01/21 1730    Lab Status: Final result Specimen: Swab from Nasopharynx Updated: 04/01/21 1904    Narrative:      The following orders were created for panel order COVID PRE-OP / PRE-PROCEDURE SCREENING ORDER (NO ISOLATION) - Swab, Nasopharynx.  Procedure                               Abnormality         Status                     ---------                               -----------         ------                     COVID-19,[facility]...[[id number]]  Normal              Final result                 Please view results for these tests on the individual orders.    COVID-19,[facility] CEPHEID, NP SWAB IN TRANSPORT MEDIA 8-12 HR TAT - Swab, Nasopharynx [[id number]]  (Normal)  Collected: 04/01/21 1730    Lab Status: Final result Specimen: Swab from Nasopharynx Updated: 04/01/21 1904     COVID19 Not Detected    Narrative:      Fact sheet for providers: https://www.fda.gov/media/567197/download     Fact sheet for patients: https://www.fda.gov/media/120075/download          Assessment:  Active Hospital Problems    Diagnosis  POA   • Hyperglycemia due to diabetes mellitus (CMS/HCC) [E10.65]  Yes   • Dehydration [E86.0]  Yes   • BRICE (obstructive sleep apnea) [G47.33]  Yes   • Uncontrolled diabetes mellitus with hyperglycemia (CMS/HCC) [E11.65]  Yes   • Intractable vomiting with nausea [R11.2]  Yes   • Seizure (CMS/HCC) [R56.9]  Yes   • Degeneration of lumbosacral intervertebral disc [M51.37]  Yes   • Gastroparesis [K31.84]  Yes   • Systemic lupus (CMS/HCC) [M32.9]  Yes       Medical decision making:  Hyperglycemia due to poorly controlled diabetes and the patient was requiring significant amount of insulin suggesting underlying insulin resistance-plan is to admit the patient continue her long-acting and premeal insulin with aggressive IV hydration while holding her Aldactone and Lasix for the next 24 hours.  Reassess her volume status and based on her blood sugar and her response to treatment consider restarting Aldactone and Lasix.  Cirrhosis of the liver due to Wong with history of portal hypertension-continue her current regimen but hold Aldactone until her blood sugar and hydration status is corrected.  Continue Xifaxan.  Seizure disorder-continue her antiseizure medications and monitor and provide her with seizure precautions.  Nausea vomiting and constipation multifactorial including underlying gastroparesis plan is to continue her metoclopramide and monitor.  Morbid obesity with obstructive sleep apnea-continue with her home CPAP device and provide her with continuous pulse ox monitoring    Scottie Nogueira MD   4/1/2021  20:44 EDT  Much of this encounter note is an electronic  transcription/translation of spoken language to printed text. The electronic translation of spoken language may permit erroneous, or at times, nonsensical words or phrases to be inadvertently transcribed; Although I have reviewed the note for such errors, some may still exist

## 2021-04-03 ENCOUNTER — APPOINTMENT (OUTPATIENT)
Dept: GENERAL RADIOLOGY | Facility: HOSPITAL | Age: 61
End: 2021-04-03

## 2021-04-03 ENCOUNTER — ANESTHESIA (OUTPATIENT)
Dept: GASTROENTEROLOGY | Facility: HOSPITAL | Age: 61
End: 2021-04-03

## 2021-04-03 ENCOUNTER — ANESTHESIA EVENT (OUTPATIENT)
Dept: GASTROENTEROLOGY | Facility: HOSPITAL | Age: 61
End: 2021-04-03

## 2021-04-03 LAB
GLUCOSE BLDC GLUCOMTR-MCNC: 142 MG/DL (ref 70–130)
GLUCOSE BLDC GLUCOMTR-MCNC: 204 MG/DL (ref 70–130)
GLUCOSE BLDC GLUCOMTR-MCNC: 87 MG/DL (ref 70–130)
GLUCOSE BLDC GLUCOMTR-MCNC: 88 MG/DL (ref 70–130)
KOH PREP NAIL: NORMAL

## 2021-04-03 PROCEDURE — 0DB58ZX EXCISION OF ESOPHAGUS, VIA NATURAL OR ARTIFICIAL OPENING ENDOSCOPIC, DIAGNOSTIC: ICD-10-PCS | Performed by: INTERNAL MEDICINE

## 2021-04-03 PROCEDURE — G0378 HOSPITAL OBSERVATION PER HR: HCPCS

## 2021-04-03 PROCEDURE — 0DB68ZX EXCISION OF STOMACH, VIA NATURAL OR ARTIFICIAL OPENING ENDOSCOPIC, DIAGNOSTIC: ICD-10-PCS | Performed by: INTERNAL MEDICINE

## 2021-04-03 PROCEDURE — 88313 SPECIAL STAINS GROUP 2: CPT | Performed by: INTERNAL MEDICINE

## 2021-04-03 PROCEDURE — 63710000001 INSULIN LISPRO (HUMAN) PER 5 UNITS: Performed by: INTERNAL MEDICINE

## 2021-04-03 PROCEDURE — 88305 TISSUE EXAM BY PATHOLOGIST: CPT | Performed by: INTERNAL MEDICINE

## 2021-04-03 PROCEDURE — 63710000001 INSULIN GLARGINE PER 5 UNITS: Performed by: INTERNAL MEDICINE

## 2021-04-03 PROCEDURE — 87220 TISSUE EXAM FOR FUNGI: CPT | Performed by: INTERNAL MEDICINE

## 2021-04-03 PROCEDURE — 74018 RADEX ABDOMEN 1 VIEW: CPT

## 2021-04-03 PROCEDURE — 25010000002 PROPOFOL 10 MG/ML EMULSION: Performed by: ANESTHESIOLOGY

## 2021-04-03 PROCEDURE — 43239 EGD BIOPSY SINGLE/MULTIPLE: CPT | Performed by: INTERNAL MEDICINE

## 2021-04-03 PROCEDURE — 82962 GLUCOSE BLOOD TEST: CPT

## 2021-04-03 RX ORDER — SPIRONOLACTONE 100 MG/1
100 TABLET, FILM COATED ORAL DAILY PRN
Status: DISCONTINUED | OUTPATIENT
Start: 2021-04-03 | End: 2021-04-04

## 2021-04-03 RX ORDER — SODIUM CHLORIDE 9 MG/ML
30 INJECTION, SOLUTION INTRAVENOUS CONTINUOUS PRN
Status: DISCONTINUED | OUTPATIENT
Start: 2021-04-03 | End: 2021-04-06 | Stop reason: HOSPADM

## 2021-04-03 RX ORDER — LIDOCAINE HYDROCHLORIDE 20 MG/ML
INJECTION, SOLUTION INFILTRATION; PERINEURAL AS NEEDED
Status: DISCONTINUED | OUTPATIENT
Start: 2021-04-03 | End: 2021-04-03 | Stop reason: SURG

## 2021-04-03 RX ORDER — FLUCONAZOLE 100 MG/1
100 TABLET ORAL EVERY 24 HOURS
Status: DISCONTINUED | OUTPATIENT
Start: 2021-04-03 | End: 2021-04-05

## 2021-04-03 RX ORDER — GLYCOPYRROLATE 0.2 MG/ML
INJECTION INTRAMUSCULAR; INTRAVENOUS AS NEEDED
Status: DISCONTINUED | OUTPATIENT
Start: 2021-04-03 | End: 2021-04-03 | Stop reason: SURG

## 2021-04-03 RX ORDER — SODIUM CHLORIDE, SODIUM LACTATE, POTASSIUM CHLORIDE, CALCIUM CHLORIDE 600; 310; 30; 20 MG/100ML; MG/100ML; MG/100ML; MG/100ML
30 INJECTION, SOLUTION INTRAVENOUS CONTINUOUS
Status: DISCONTINUED | OUTPATIENT
Start: 2021-04-03 | End: 2021-04-04

## 2021-04-03 RX ORDER — PROPOFOL 10 MG/ML
VIAL (ML) INTRAVENOUS CONTINUOUS PRN
Status: DISCONTINUED | OUTPATIENT
Start: 2021-04-03 | End: 2021-04-03 | Stop reason: SURG

## 2021-04-03 RX ORDER — FLUCONAZOLE 2 MG/ML
100 INJECTION, SOLUTION INTRAVENOUS DAILY
Status: DISCONTINUED | OUTPATIENT
Start: 2021-04-03 | End: 2021-04-03 | Stop reason: HOSPADM

## 2021-04-03 RX ORDER — FUROSEMIDE 80 MG
80 TABLET ORAL DAILY PRN
Status: DISCONTINUED | OUTPATIENT
Start: 2021-04-03 | End: 2021-04-04

## 2021-04-03 RX ADMIN — METOCLOPRAMIDE 10 MG: 10 TABLET ORAL at 20:26

## 2021-04-03 RX ADMIN — MIRTAZAPINE 15 MG: 15 TABLET, FILM COATED ORAL at 20:26

## 2021-04-03 RX ADMIN — PROPOFOL 200 MCG/KG/MIN: 10 INJECTION, EMULSION INTRAVENOUS at 09:55

## 2021-04-03 RX ADMIN — INSULIN GLARGINE 80 UNITS: 100 INJECTION, SOLUTION SUBCUTANEOUS at 23:01

## 2021-04-03 RX ADMIN — LEVETIRACETAM 500 MG: 500 TABLET, FILM COATED ORAL at 12:01

## 2021-04-03 RX ADMIN — LEVETIRACETAM 500 MG: 500 TABLET, FILM COATED ORAL at 20:26

## 2021-04-03 RX ADMIN — CHOLESTYRAMINE 8 G: 4 POWDER, FOR SUSPENSION ORAL at 13:04

## 2021-04-03 RX ADMIN — LIDOCAINE HYDROCHLORIDE 60 MG: 20 INJECTION, SOLUTION INFILTRATION; PERINEURAL at 09:56

## 2021-04-03 RX ADMIN — Medication 1 TABLET: at 12:03

## 2021-04-03 RX ADMIN — FERROUS SULFATE TAB 325 MG (65 MG ELEMENTAL FE) 325 MG: 325 (65 FE) TAB at 12:02

## 2021-04-03 RX ADMIN — OXYCODONE 7.5 MG: 5 TABLET ORAL at 09:27

## 2021-04-03 RX ADMIN — GLYCOPYRROLATE 0.2 MG: 0.2 INJECTION INTRAMUSCULAR; INTRAVENOUS at 09:56

## 2021-04-03 RX ADMIN — CHOLESTYRAMINE 8 G: 4 POWDER, FOR SUSPENSION ORAL at 20:28

## 2021-04-03 RX ADMIN — SUCRALFATE 1 G: 1 TABLET ORAL at 12:02

## 2021-04-03 RX ADMIN — PREGABALIN 75 MG: 75 CAPSULE ORAL at 12:02

## 2021-04-03 RX ADMIN — HYDROXYZINE HYDROCHLORIDE 50 MG: 50 TABLET ORAL at 20:27

## 2021-04-03 RX ADMIN — RIFAXIMIN 550 MG: 550 TABLET ORAL at 20:27

## 2021-04-03 RX ADMIN — PREGABALIN 75 MG: 75 CAPSULE ORAL at 20:26

## 2021-04-03 RX ADMIN — FLUOXETINE HYDROCHLORIDE 10 MG: 10 CAPSULE ORAL at 12:03

## 2021-04-03 RX ADMIN — SUCRALFATE 1 G: 1 TABLET ORAL at 20:26

## 2021-04-03 RX ADMIN — ALPRAZOLAM 0.5 MG: 0.5 TABLET ORAL at 20:27

## 2021-04-03 RX ADMIN — PANTOPRAZOLE SODIUM 40 MG: 40 TABLET, DELAYED RELEASE ORAL at 12:01

## 2021-04-03 RX ADMIN — SUCRALFATE 1 G: 1 TABLET ORAL at 18:05

## 2021-04-03 RX ADMIN — RIFAXIMIN 550 MG: 550 TABLET ORAL at 12:02

## 2021-04-03 RX ADMIN — SODIUM CHLORIDE, PRESERVATIVE FREE 10 ML: 5 INJECTION INTRAVENOUS at 20:27

## 2021-04-03 RX ADMIN — FLUCONAZOLE 100 MG: 100 TABLET ORAL at 15:01

## 2021-04-03 RX ADMIN — INSULIN GLARGINE 80 UNITS: 100 INJECTION, SOLUTION SUBCUTANEOUS at 12:00

## 2021-04-03 RX ADMIN — OXYCODONE 7.5 MG: 5 TABLET ORAL at 18:05

## 2021-04-03 RX ADMIN — SODIUM CHLORIDE 125 ML/HR: 9 INJECTION, SOLUTION INTRAVENOUS at 20:25

## 2021-04-03 RX ADMIN — MAGNESIUM OXIDE 400 MG (241.3 MG MAGNESIUM) TABLET 400 MG: TABLET at 12:02

## 2021-04-03 RX ADMIN — DOCUSATE SODIUM 100 MG: 100 CAPSULE, LIQUID FILLED ORAL at 12:02

## 2021-04-03 RX ADMIN — SODIUM CHLORIDE 125 ML/HR: 9 INJECTION, SOLUTION INTRAVENOUS at 01:06

## 2021-04-03 RX ADMIN — METOCLOPRAMIDE 10 MG: 10 TABLET ORAL at 18:05

## 2021-04-03 RX ADMIN — ALPRAZOLAM 0.5 MG: 0.5 TABLET ORAL at 02:18

## 2021-04-03 RX ADMIN — METOCLOPRAMIDE 10 MG: 10 TABLET ORAL at 12:02

## 2021-04-03 RX ADMIN — Medication 1000 MCG: at 12:02

## 2021-04-03 RX ADMIN — SODIUM CHLORIDE 125 ML/HR: 9 INJECTION, SOLUTION INTRAVENOUS at 12:03

## 2021-04-03 RX ADMIN — SODIUM CHLORIDE, PRESERVATIVE FREE 10 ML: 5 INJECTION INTRAVENOUS at 08:41

## 2021-04-03 RX ADMIN — INSULIN LISPRO 60 UNITS: 100 INJECTION, SOLUTION INTRAVENOUS; SUBCUTANEOUS at 18:06

## 2021-04-03 NOTE — PLAN OF CARE
Goal Outcome Evaluation:  Plan of Care Reviewed With: patient  Progress: improving  Outcome Summary: Med for pain x 1 with some relief stated.  xanax given for anxiety with some relief stated.  VSS, NSR on monitor, eyes closed at long interval, resting quietly in room. will continue to monitor

## 2021-04-03 NOTE — ANESTHESIA POSTPROCEDURE EVALUATION
"Patient: Silvia Zabala    Procedure Summary     Date: 04/03/21 Room / Location: [facility] ENDOSCOPY 7 /  [facility]    Anesthesia Start: 0954 Anesthesia Stop: 1015    Procedure: ESOPHAGOGASTRODUODENOSCOPY WITH COLD BIOPSIES & BRUSHINGS (N/A Esophagus) Diagnosis:     Surgeons: Endy Vasquez MD Provider: Marco Maciel MD    Anesthesia Type: MAC ASA Status: 3          Anesthesia Type: MAC    Vitals  Vitals Value Taken Time   /76 04/03/21 1058   Temp     Pulse 90 04/03/21 1058   Resp 16 04/03/21 1058   SpO2 96 % 04/03/21 1058           Post Anesthesia Care and Evaluation    Patient location during evaluation: PACU  Patient participation: complete - patient participated  Level of consciousness: awake  Pain score: 0  Pain management: adequate  Airway patency: patent  Anesthetic complications: No anesthetic complications  PONV Status: none  Cardiovascular status: acceptable  Respiratory status: acceptable  Hydration status: acceptable    Comments: /76 (BP Location: Left arm, Patient Position: Sitting)   Pulse 90   Temp 36.5 °C (97.7 °F) (Oral)   Resp 16   Ht 170.2 cm (67\")   Wt 81.6 kg (180 lb)   LMP  (LMP Unknown)   SpO2 96%   BMI 28.19 kg/m²       "

## 2021-04-03 NOTE — PROGRESS NOTES
Name: Silvia Zabala ADMIT: 2021   : 1960  PCP: Kulwant Martínez APRN    MRN: [id number] LOS: 0 days   AGE/SEX: 60 y.o. female  ROOM: [facility]     Subjective   Subjective    Status post EGD.  She feels her chronic abdominal pain from gastroparesis is little bit worse than usual.  Some discomfort with swallowing.  No chest pain or shortness of breath.    Review of Systems   Constitutional: Negative for fever.   Respiratory: Negative for cough and shortness of breath.    Cardiovascular: Negative for chest pain.   Gastrointestinal: Positive for abdominal pain.      Objective   Objective   Vital Signs  Temp:  [97.7 °F (36.5 °C)-98.5 °F (36.9 °C)] 97.7 °F (36.5 °C)  Heart Rate:  [85-97] 90  Resp:  [16-18] 16  BP: (108-146)/(61-76) 146/76  SpO2:  [93 %-99 %] 96 %  on  Flow (L/min):  [6-8] 6;   Device (Oxygen Therapy): room air  Body mass index is 28.19 kg/m².    Physical Exam  Vitals and nursing note reviewed.   Constitutional:       General: She is not in acute distress.  HENT:      Head: Normocephalic and atraumatic.   Cardiovascular:      Rate and Rhythm: Normal rate and regular rhythm.   Pulmonary:      Effort: Pulmonary effort is normal. No respiratory distress.      Breath sounds: Normal breath sounds.   Abdominal:      General: Bowel sounds are normal. There is distension.      Palpations: Abdomen is soft.      Tenderness: There is no abdominal tenderness. There is no guarding or rebound.   Musculoskeletal:         General: No swelling.   Skin:     General: Skin is warm and dry.   Neurological:      General: No focal deficit present.      Mental Status: She is alert and oriented to person, place, and time.   Psychiatric:         Mood and Affect: Mood normal.         Behavior: Behavior normal.     Results Review  I reviewed the patient's new clinical results.  Results from last 7 days   Lab Units 21  0525 21  1714   WBC 10*3/mm3 4.08 4.71   HEMOGLOBIN g/dL 11.2* 13.3   PLATELETS 10*3/mm3 95*  107*     Results from last 7 days   Lab Units 04/02/21  0525 04/01/21  1714   SODIUM mmol/L 132* 130*   POTASSIUM mmol/L 4.3 4.1   CHLORIDE mmol/L 101 94*   CO2 mmol/L 21.4* 21.6*   BUN mg/dL 20 23*   CREATININE mg/dL 0.82 1.05*   GLUCOSE mg/dL 367* 606*     Estimated Creatinine Clearance: 82.2 mL/min (by C-G formula based on SCr of 0.82 mg/dL).    Results from last 7 days   Lab Units 04/02/21  0525 04/01/21  1714   ALBUMIN g/dL 3.20* 3.90   BILIRUBIN mg/dL 1.4* 2.2*   ALK PHOS U/L 141* 180*   AST (SGOT) U/L 24 28   ALT (SGPT) U/L 23 29     Results from last 7 days   Lab Units 04/02/21  0525 04/01/21  1714   CALCIUM mg/dL 8.5* 9.6   ALBUMIN g/dL 3.20* 3.90   MAGNESIUM mg/dL  --  1.7     Results from last 7 days   Lab Units 04/02/21  0525 04/01/21  2041 04/01/21  1735 04/01/21  1714   PROCALCITONIN ng/mL  --   --   --  0.34*   LACTATE mmol/L 1.3 2.7* 3.3*  --      Glucose   Date/Time Value Ref Range Status   04/03/2021 1126 88 70 - 130 mg/dL Final   04/03/2021 0606 87 70 - 130 mg/dL Final   04/02/2021 2051 97 70 - 130 mg/dL Final   04/02/2021 1618 240 (H) 70 - 130 mg/dL Final   04/02/2021 1103 389 (H) 70 - 130 mg/dL Final   04/02/2021 1101 392 (H) 70 - 130 mg/dL Final   04/02/2021 0700 355 (H) 70 - 130 mg/dL Final     Scheduled Meds  cholestyramine light, 8 g, Oral, BID  docusate sodium, 100 mg, Oral, Daily  ferrous sulfate, 325 mg, Oral, Daily With Breakfast  fluconazole, 100 mg, Oral, Q24H  FLUoxetine, 10 mg, Oral, Daily  hydrOXYzine, 50 mg, Oral, Nightly  insulin glargine, 80 Units, Subcutaneous, BID  insulin lispro, 0-9 Units, Subcutaneous, TID AC  insulin lispro, 60 Units, Subcutaneous, TID AC  levETIRAcetam, 500 mg, Oral, BID  magnesium oxide, 400 mg, Oral, Daily  metoclopramide, 10 mg, Oral, 4x Daily AC & at Bedtime  mirtazapine, 15 mg, Oral, Nightly  multivitamin with minerals, 1 tablet, Oral, Daily  pantoprazole, 40 mg, Oral, QAM  pregabalin, 75 mg, Oral, Q12H  riFAXIMin, 550 mg, Oral, BID  sodium chloride,  10 mL, Intravenous, Q12H  sucralfate, 1 g, Oral, 4x Daily AC & at Bedtime  vitamin B-12, 1,000 mcg, Oral, Daily  vitamin D, 50,000 Units, Oral, Q7 Days    Continuous Infusions  sodium chloride, 125 mL/hr, Last Rate: 125 mL/hr (04/03/21 1203)  sodium chloride, 30 mL/hr, Last Rate: Stopped (04/03/21 1058)    PRN Meds  •  ALPRAZolam  •  dextrose  •  dextrose  •  glucagon (human recombinant)  •  nitroglycerin  •  oxyCODONE  •  [COMPLETED] Insert peripheral IV **AND** sodium chloride  •  sodium chloride  •  sodium chloride    sodium chloride, 125 mL/hr, Last Rate: 125 mL/hr (04/03/21 1203)  sodium chloride, 30 mL/hr, Last Rate: Stopped (04/03/21 1058)    Diet  Diet Regular; Consistent Carbohydrate     Assessment/Plan     Active Hospital Problems    Diagnosis  POA   • **Uncontrolled diabetes mellitus with hyperglycemia (CMS/HCC) [E11.65]  Yes   • Elevated procalcitonin [R79.89]  Unknown   • Dehydration [E86.0]  Yes   • BRICE (obstructive sleep apnea) [G47.33]  Yes   • Intractable vomiting with nausea [R11.2]  Yes   • 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     60 y.o. female admitted with weakness and uncontrolled diabetes.    · Diabetes with hyperglycemia, uncontrolled  · Improved.  See how she does after Diet Regular; Consistent Carbohydrate restarted  · She had a continuous glucose monitor but switched to fingerstick due to some issue with supply  · She has ollow-up with Dr. Burnett April 22  · Dehydration due to hyperglycemia, GI losses  · Improved with fluids.  · Lactate elevation resolved  · Nausea and vomiting  · She is a history of gastroparesis followed by GI  · She is on scheduled Reglan  ·   · Status post EGD 4/3/21 Candidiasis esophagitis: Diflucan  · Check a post procedure KUB  · DUKES, cirrhosis  · Resume diuretics  · Rifaximin  · Elevated procalcitonin  · Appreciate ID.  No  infectious work-up  · SCDs for DVT prophylaxis  · Full code  · Discussed with patient and nursing staff  · Anticipate discharge home in 1-2 days.    Dontae Brower MD  [facility]  04/03/21  14:04 EDT    I wore protective equipment throughout this patient encounter including a face mask, gloves, and protective eyewear.  Hand hygiene was performed before donning protective equipment and after removal when leaving the room.

## 2021-04-03 NOTE — PROGRESS NOTES
"  Infectious Diseases Progress Note    Scottie Nogueira MD     [facility]  Los: 0 days  Patient Identification:  Name: Siliva Zabala  Age: 60 y.o.  Sex: female  :  1960  MRN: [id number]         Primary Care Physician: Kulwant Martínez APRN            Subjective: Continues to feel better.  Had EGD performed and after EGD she developed some nausea.  Interval History: See admission history and physical done as cross cover for primary medical team.    Objective:    Scheduled Meds:cholestyramine light, 8 g, Oral, BID  docusate sodium, 100 mg, Oral, Daily  ferrous sulfate, 325 mg, Oral, Daily With Breakfast  FLUoxetine, 10 mg, Oral, Daily  hydrOXYzine, 50 mg, Oral, Nightly  insulin glargine, 80 Units, Subcutaneous, BID  insulin lispro, 0-9 Units, Subcutaneous, TID AC  insulin lispro, 60 Units, Subcutaneous, TID AC  levETIRAcetam, 500 mg, Oral, BID  magnesium oxide, 400 mg, Oral, Daily  metoclopramide, 10 mg, Oral, 4x Daily AC & at Bedtime  mirtazapine, 15 mg, Oral, Nightly  multivitamin with minerals, 1 tablet, Oral, Daily  pantoprazole, 40 mg, Oral, QAM  pregabalin, 75 mg, Oral, Q12H  riFAXIMin, 550 mg, Oral, BID  sodium chloride, 10 mL, Intravenous, Q12H  sucralfate, 1 g, Oral, 4x Daily AC & at Bedtime  vitamin B-12, 1,000 mcg, Oral, Daily  vitamin D, 50,000 Units, Oral, Q7 Days      Continuous Infusions:sodium chloride, 125 mL/hr, Last Rate: 125 mL/hr (21 0735)        Vital signs in last 24 hours:  Temp:  [97.7 °F (36.5 °C)-98.5 °F (36.9 °C)] 97.7 °F (36.5 °C)  Heart Rate:  [86-97] 86  Resp:  [18] 18  BP: (117-141)/(61-70) 125/61    Intake/Output:    Intake/Output Summary (Last 24 hours) at 4/3/2021 0832  Last data filed at 4/3/2021 0528  Gross per 24 hour   Intake 1050 ml   Output --   Net 1050 ml       Exam:  /61 (BP Location: Left arm, Patient Position: Lying)   Pulse 86   Temp 97.7 °F (36.5 °C) (Oral)   Resp 18   Ht 170.2 cm (67\")   Wt 80.8 kg (178 lb 1.6 oz)   LMP  (LMP Unknown) "   SpO2 93%   BMI 27.89 kg/m²   Patient is examined using the personal protective equipment as per guidelines from infection control for this particular patient as enacted.  Hand washing was performed before and after patient interaction.  General Appearance:    Alert, cooperative, no distress, AAOx3 and appears to have a better color in appearance compared to 24 hours ago.                          Head:    Normocephalic, without obvious abnormality, atraumatic                           Eyes:    PERRL, conjunctivae/corneas clear, EOM's intact, both eyes                         Throat:   Lips, tongue, gums normal; oral mucosa pink and moist                           Neck:   Supple, symmetrical, trachea midline, no JVD                         Lungs:    Clear to auscultation bilaterally, respirations unlabored                 Chest Wall:    No tenderness or deformity                          Heart:    Regular rate and rhythm, S1 and S2 normal                  Abdomen:     Soft, non-tender, bowel sounds active                 Extremities:   Extremities normal, atraumatic, no cyanosis or edema                        Pulses:   Pulses palpable in all extremities                            Skin:   Skin is warm and dry,  no rashes or palpable lesions                  Neurologic:   CNII-XII intact, motor strength grossly intact     Data Review:    I reviewed the patient's new clinical results.  Results from last 7 days   Lab Units 04/02/21  0525 04/01/21  1714   WBC 10*3/mm3 4.08 4.71   HEMOGLOBIN g/dL 11.2* 13.3   PLATELETS 10*3/mm3 95* 107*     Results from last 7 days   Lab Units 04/02/21  0525 04/01/21  1714   SODIUM mmol/L 132* 130*   POTASSIUM mmol/L 4.3 4.1   CHLORIDE mmol/L 101 94*   CO2 mmol/L 21.4* 21.6*   BUN mg/dL 20 23*   CREATININE mg/dL 0.82 1.05*   CALCIUM mg/dL 8.5* 9.6   GLUCOSE mg/dL 367* 606*     Microbiology Results (last 10 days)     Procedure Component Value - Date/Time    Blood Culture - Blood, Arm,  Right [[id number]] Collected: 04/01/21 1735    Lab Status: Preliminary result Specimen: Blood from Arm, Right Updated: 04/02/21 1800     Blood Culture No growth at 24 hours    COVID PRE-OP / PRE-PROCEDURE SCREENING ORDER (NO ISOLATION) - Swab, Nasopharynx [[id number]]  (Normal) Collected: 04/01/21 1730    Lab Status: Final result Specimen: Swab from Nasopharynx Updated: 04/01/21 1904    Narrative:      The following orders were created for panel order COVID PRE-OP / PRE-PROCEDURE SCREENING ORDER (NO ISOLATION) - Swab, Nasopharynx.  Procedure                               Abnormality         Status                     ---------                               -----------         ------                     COVID-19,[facility]...[[id number]]  Normal              Final result                 Please view results for these tests on the individual orders.    COVID-19,[facility] CEPHEID, NP SWAB IN TRANSPORT MEDIA 8-12 HR TAT - Swab, Nasopharynx [[id number]]  (Normal) Collected: 04/01/21 1730    Lab Status: Final result Specimen: Swab from Nasopharynx Updated: 04/01/21 1904     COVID19 Not Detected    Narrative:      Fact sheet for providers: https://www.fda.gov/media/154247/download     Fact sheet for patients: https://www.fda.gov/media/402373/download    Blood Culture - Blood, Arm, Left [[id number]] Collected: 04/01/21 1714    Lab Status: Preliminary result Specimen: Blood from Arm, Left Updated: 04/02/21 1730     Blood Culture No growth at 24 hours      Procalcitonin 0.34      Assessment:    Uncontrolled diabetes mellitus with hyperglycemia (CMS/HCC)    Gastroparesis    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 (CMS/HCC)    Intractable vomiting with nausea    BRICE (obstructive sleep apnea)    Dehydration    Elevated procalcitonin  Candida esophagitis    Recommendations/discussion:  Continue supportive care  Continue with treatment for Candida esophagitis  Management of hydration blood sugar control per primary team.  No  further work-up or evaluation or treatment needed for elevated procalcitonin.    Scottie Nogueira MD  4/3/2021  08:32 EDT    Much of this encounter note is an electronic transcription/translation of spoken language to printed text. The electronic translation of spoken language may permit erroneous, or at times, nonsensical words or phrases to be inadvertently transcribed; Although I have reviewed the note for such errors, some may still exist

## 2021-04-03 NOTE — ANESTHESIA PREPROCEDURE EVALUATION
Anesthesia Evaluation     Patient summary reviewed and Nursing notes reviewed   history of anesthetic complications:  NPO Solid Status: > 8 hours  NPO Liquid Status: > 2 hours           Airway   Mallampati: I  TM distance: >3 FB  Neck ROM: full  No difficulty expected  Dental - normal exam     Pulmonary - normal exam   (+) a smoker Former,   Cardiovascular - normal exam    (+) valvular problems/murmurs MVP, hyperlipidemia,       Neuro/Psych  (+) seizures, TIA, headaches, numbness, psychiatric history Anxiety and Depression,     GI/Hepatic/Renal/Endo    (+)  hiatal hernia, PUD, GI bleeding , hepatitis, liver disease fatty liver disease, diabetes mellitus,     Musculoskeletal     Abdominal  - normal exam    Bowel sounds: normal.   Substance History - negative use     OB/GYN negative ob/gyn ROS         Other   arthritis, blood dyscrasia,                   Anesthesia Plan    ASA 3     MAC       Anesthetic plan, all risks, benefits, and alternatives have been provided, discussed and informed consent has been obtained with: patient.

## 2021-04-03 NOTE — CONSULTS
"  Infectious Diseases Progress Note    Scottie Nogueira MD     [facility]  Los: 0 days  Patient Identification:  Name: Silvia Zabala  Age: 60 y.o.  Sex: female  :  1960  MRN: [id number]         Primary Care Physician: Kulwant Martínez APRN            Subjective: Overall feeling much better compared to yesterday.  Interval History: See admission history and physical done as cross cover for primary medical team.    Objective:    Scheduled Meds:cholestyramine light, 8 g, Oral, BID  docusate sodium, 100 mg, Oral, Daily  ferrous sulfate, 325 mg, Oral, Daily With Breakfast  FLUoxetine, 10 mg, Oral, Daily  hydrOXYzine, 50 mg, Oral, Nightly  insulin glargine, 80 Units, Subcutaneous, BID  insulin lispro, 0-9 Units, Subcutaneous, TID AC  insulin lispro, 60 Units, Subcutaneous, TID AC  levETIRAcetam, 500 mg, Oral, BID  magnesium oxide, 400 mg, Oral, Daily  metoclopramide, 10 mg, Oral, 4x Daily AC & at Bedtime  mirtazapine, 15 mg, Oral, Nightly  multivitamin with minerals, 1 tablet, Oral, Daily  pantoprazole, 40 mg, Oral, QAM  pregabalin, 75 mg, Oral, Q12H  riFAXIMin, 550 mg, Oral, BID  sodium chloride, 10 mL, Intravenous, Q12H  sucralfate, 1 g, Oral, 4x Daily AC & at Bedtime  vitamin B-12, 1,000 mcg, Oral, Daily  vitamin D, 50,000 Units, Oral, Q7 Days      Continuous Infusions:sodium chloride, 125 mL/hr, Last Rate: 125 mL/hr (21)        Vital signs in last 24 hours:  Temp:  [98 °F (36.7 °C)-98.5 °F (36.9 °C)] 98.5 °F (36.9 °C)  Heart Rate:  [81-97] 88  Resp:  [18] 18  BP: (117-138)/(62-67) 117/62    Intake/Output:    Intake/Output Summary (Last 24 hours) at 20216  Last data filed at 2021 0830  Gross per 24 hour   Intake --   Output 500 ml   Net -500 ml       Exam:  /62 (BP Location: Left arm, Patient Position: Lying)   Pulse 88   Temp 98.5 °F (36.9 °C) (Oral)   Resp 18   Ht 170.2 cm (67\")   Wt 80.8 kg (178 lb 1.6 oz)   LMP  (LMP Unknown)   SpO2 97%   BMI 27.89 kg/m² "   Patient is examined using the personal protective equipment as per guidelines from infection control for this particular patient as enacted.  Hand washing was performed before and after patient interaction.  General Appearance:    Alert, cooperative, no distress, AAOx3 and appears to have a better color in appearance compared to 24 hours ago.                          Head:    Normocephalic, without obvious abnormality, atraumatic                           Eyes:    PERRL, conjunctivae/corneas clear, EOM's intact, both eyes                         Throat:   Lips, tongue, gums normal; oral mucosa pink and moist                           Neck:   Supple, symmetrical, trachea midline, no JVD                         Lungs:    Clear to auscultation bilaterally, respirations unlabored                 Chest Wall:    No tenderness or deformity                          Heart:    Regular rate and rhythm, S1 and S2 normal                  Abdomen:     Soft, non-tender, bowel sounds active                 Extremities:   Extremities normal, atraumatic, no cyanosis or edema                        Pulses:   Pulses palpable in all extremities                            Skin:   Skin is warm and dry,  no rashes or palpable lesions                  Neurologic:   CNII-XII intact, motor strength grossly intact     Data Review:    I reviewed the patient's new clinical results.  Results from last 7 days   Lab Units 04/02/21  0525 04/01/21  1714   WBC 10*3/mm3 4.08 4.71   HEMOGLOBIN g/dL 11.2* 13.3   PLATELETS 10*3/mm3 95* 107*     Results from last 7 days   Lab Units 04/02/21  0525 04/01/21  1714   SODIUM mmol/L 132* 130*   POTASSIUM mmol/L 4.3 4.1   CHLORIDE mmol/L 101 94*   CO2 mmol/L 21.4* 21.6*   BUN mg/dL 20 23*   CREATININE mg/dL 0.82 1.05*   CALCIUM mg/dL 8.5* 9.6   GLUCOSE mg/dL 367* 606*     Microbiology Results (last 10 days)     Procedure Component Value - Date/Time    Blood Culture - Blood, Arm, Right [[id number]] Collected:  04/01/21 1735    Lab Status: Preliminary result Specimen: Blood from Arm, Right Updated: 04/02/21 1800     Blood Culture No growth at 24 hours    COVID PRE-OP / PRE-PROCEDURE SCREENING ORDER (NO ISOLATION) - Swab, Nasopharynx [[id number]]  (Normal) Collected: 04/01/21 1730    Lab Status: Final result Specimen: Swab from Nasopharynx Updated: 04/01/21 1904    Narrative:      The following orders were created for panel order COVID PRE-OP / PRE-PROCEDURE SCREENING ORDER (NO ISOLATION) - Swab, Nasopharynx.  Procedure                               Abnormality         Status                     ---------                               -----------         ------                     COVID-19,[facility]...[[id number]]  Normal              Final result                 Please view results for these tests on the individual orders.    COVID-19,[facility] CEPHEID, NP SWAB IN TRANSPORT MEDIA 8-12 HR TAT - Swab, Nasopharynx [[id number]]  (Normal) Collected: 04/01/21 1730    Lab Status: Final result Specimen: Swab from Nasopharynx Updated: 04/01/21 1904     COVID19 Not Detected    Narrative:      Fact sheet for providers: https://www.fda.gov/media/476522/download     Fact sheet for patients: https://www.fda.gov/media/305894/download    Blood Culture - Blood, Arm, Left [[id number]] Collected: 04/01/21 1714    Lab Status: Preliminary result Specimen: Blood from Arm, Left Updated: 04/02/21 1730     Blood Culture No growth at 24 hours      Procalcitonin 0.34      Assessment:    Uncontrolled diabetes mellitus with hyperglycemia (CMS/HCC)    Gastroparesis    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 (CMS/HCC)    Intractable vomiting with nausea    BRICE (obstructive sleep apnea)    Dehydration    Elevated procalcitonin      Recommendations/discussion:  Patient has improved significantly and most of her plight can be explained by severe dehydration and uncontrolled diabetes with hyperglycemia.  The significance of her procalcitonin level in the  setting of underlying liver disease with no other localizing symptoms of sepsis is unclear.  At this juncture would not recommend any further work-up or antibiotic treatment for elevated procalcitonin level.  What she needs is aggressive supportive care and correction of her metabolic abnormalities while providing her with exhaustive review of systems identify evolving infectious process such as UTI pyelonephritis aspiration pneumonia cellulitis etc. for which specific antimicrobial regimen can be started and duration of treatment can be decided.  In the absence of specific infectious syndrome or evidence of sepsis besides elevated procalcitonin , treatment with antibiotic therapy may result in more harm than any benefit.  Would not recommend repeating procalcitonin level unless patient shows other symptoms or signs suggestive of evolving sepsis.  Thank you Dr. Brower for letting me be the part of your patient care please see above impression and recommendations    Scottie Nogueira MD  4/2/2021  21:46 EDT    Much of this encounter note is an electronic transcription/translation of spoken language to printed text. The electronic translation of spoken language may permit erroneous, or at times, nonsensical words or phrases to be inadvertently transcribed; Although I have reviewed the note for such errors, some may still exist

## 2021-04-04 PROBLEM — E10.65 HYPERGLYCEMIA DUE TO TYPE 1 DIABETES MELLITUS: Status: ACTIVE | Noted: 2021-04-04

## 2021-04-04 LAB
GLUCOSE BLDC GLUCOMTR-MCNC: 120 MG/DL (ref 70–130)
GLUCOSE BLDC GLUCOMTR-MCNC: 224 MG/DL (ref 70–130)
GLUCOSE BLDC GLUCOMTR-MCNC: 230 MG/DL (ref 70–130)
GLUCOSE BLDC GLUCOMTR-MCNC: 65 MG/DL (ref 70–130)
GLUCOSE BLDC GLUCOMTR-MCNC: 84 MG/DL (ref 70–130)

## 2021-04-04 PROCEDURE — 82962 GLUCOSE BLOOD TEST: CPT

## 2021-04-04 PROCEDURE — 99232 SBSQ HOSP IP/OBS MODERATE 35: CPT | Performed by: INTERNAL MEDICINE

## 2021-04-04 PROCEDURE — 63710000001 INSULIN LISPRO (HUMAN) PER 5 UNITS: Performed by: INTERNAL MEDICINE

## 2021-04-04 PROCEDURE — 97110 THERAPEUTIC EXERCISES: CPT

## 2021-04-04 PROCEDURE — 63710000001 INSULIN GLARGINE PER 5 UNITS: Performed by: INTERNAL MEDICINE

## 2021-04-04 PROCEDURE — 63710000001 INSULIN LISPRO (HUMAN) PER 5 UNITS: Performed by: HOSPITALIST

## 2021-04-04 PROCEDURE — 63710000001 INSULIN GLARGINE PER 5 UNITS: Performed by: HOSPITALIST

## 2021-04-04 RX ORDER — INSULIN LISPRO 100 [IU]/ML
40 INJECTION, SOLUTION INTRAVENOUS; SUBCUTANEOUS
Status: DISCONTINUED | OUTPATIENT
Start: 2021-04-04 | End: 2021-04-06 | Stop reason: HOSPADM

## 2021-04-04 RX ORDER — SPIRONOLACTONE 100 MG/1
100 TABLET, FILM COATED ORAL DAILY
Status: DISCONTINUED | OUTPATIENT
Start: 2021-04-04 | End: 2021-04-06 | Stop reason: HOSPADM

## 2021-04-04 RX ORDER — FUROSEMIDE 80 MG
80 TABLET ORAL DAILY
Status: DISCONTINUED | OUTPATIENT
Start: 2021-04-04 | End: 2021-04-06 | Stop reason: HOSPADM

## 2021-04-04 RX ORDER — INSULIN GLARGINE 100 [IU]/ML
50 INJECTION, SOLUTION SUBCUTANEOUS 2 TIMES DAILY
Status: DISCONTINUED | OUTPATIENT
Start: 2021-04-04 | End: 2021-04-06 | Stop reason: HOSPADM

## 2021-04-04 RX ADMIN — LEVETIRACETAM 500 MG: 500 TABLET, FILM COATED ORAL at 20:29

## 2021-04-04 RX ADMIN — PREGABALIN 75 MG: 75 CAPSULE ORAL at 09:29

## 2021-04-04 RX ADMIN — INSULIN LISPRO 60 UNITS: 100 INJECTION, SOLUTION INTRAVENOUS; SUBCUTANEOUS at 09:33

## 2021-04-04 RX ADMIN — INSULIN LISPRO 4 UNITS: 100 INJECTION, SOLUTION INTRAVENOUS; SUBCUTANEOUS at 09:33

## 2021-04-04 RX ADMIN — INSULIN LISPRO 40 UNITS: 100 INJECTION, SOLUTION INTRAVENOUS; SUBCUTANEOUS at 16:57

## 2021-04-04 RX ADMIN — OXYCODONE 7.5 MG: 5 TABLET ORAL at 02:05

## 2021-04-04 RX ADMIN — RIFAXIMIN 550 MG: 550 TABLET ORAL at 09:28

## 2021-04-04 RX ADMIN — SUCRALFATE 1 G: 1 TABLET ORAL at 16:57

## 2021-04-04 RX ADMIN — DOCUSATE SODIUM 100 MG: 100 CAPSULE, LIQUID FILLED ORAL at 09:29

## 2021-04-04 RX ADMIN — RIFAXIMIN 550 MG: 550 TABLET ORAL at 20:30

## 2021-04-04 RX ADMIN — FUROSEMIDE 80 MG: 80 TABLET ORAL at 12:24

## 2021-04-04 RX ADMIN — SODIUM CHLORIDE, PRESERVATIVE FREE 10 ML: 5 INJECTION INTRAVENOUS at 20:30

## 2021-04-04 RX ADMIN — METOCLOPRAMIDE 10 MG: 10 TABLET ORAL at 10:34

## 2021-04-04 RX ADMIN — MIRTAZAPINE 15 MG: 15 TABLET, FILM COATED ORAL at 20:30

## 2021-04-04 RX ADMIN — LEVETIRACETAM 500 MG: 500 TABLET, FILM COATED ORAL at 09:28

## 2021-04-04 RX ADMIN — FERROUS SULFATE TAB 325 MG (65 MG ELEMENTAL FE) 325 MG: 325 (65 FE) TAB at 09:28

## 2021-04-04 RX ADMIN — Medication 1 TABLET: at 09:28

## 2021-04-04 RX ADMIN — HYDROXYZINE HYDROCHLORIDE 50 MG: 50 TABLET ORAL at 20:30

## 2021-04-04 RX ADMIN — SODIUM CHLORIDE, PRESERVATIVE FREE 10 ML: 5 INJECTION INTRAVENOUS at 09:36

## 2021-04-04 RX ADMIN — SPIRONOLACTONE 100 MG: 100 TABLET, FILM COATED ORAL at 12:24

## 2021-04-04 RX ADMIN — Medication 1000 MCG: at 09:28

## 2021-04-04 RX ADMIN — CHOLESTYRAMINE 8 G: 4 POWDER, FOR SUSPENSION ORAL at 09:28

## 2021-04-04 RX ADMIN — METOCLOPRAMIDE 10 MG: 10 TABLET ORAL at 20:29

## 2021-04-04 RX ADMIN — INSULIN LISPRO 40 UNITS: 100 INJECTION, SOLUTION INTRAVENOUS; SUBCUTANEOUS at 12:25

## 2021-04-04 RX ADMIN — SUCRALFATE 1 G: 1 TABLET ORAL at 02:06

## 2021-04-04 RX ADMIN — OXYCODONE 7.5 MG: 5 TABLET ORAL at 22:25

## 2021-04-04 RX ADMIN — FLUCONAZOLE 100 MG: 100 TABLET ORAL at 12:20

## 2021-04-04 RX ADMIN — INSULIN LISPRO 4 UNITS: 100 INJECTION, SOLUTION INTRAVENOUS; SUBCUTANEOUS at 12:24

## 2021-04-04 RX ADMIN — FLUOXETINE HYDROCHLORIDE 10 MG: 10 CAPSULE ORAL at 09:28

## 2021-04-04 RX ADMIN — ALPRAZOLAM 0.5 MG: 0.5 TABLET ORAL at 22:25

## 2021-04-04 RX ADMIN — PREGABALIN 75 MG: 75 CAPSULE ORAL at 20:29

## 2021-04-04 RX ADMIN — CHOLESTYRAMINE 8 G: 4 POWDER, FOR SUSPENSION ORAL at 20:30

## 2021-04-04 RX ADMIN — METOCLOPRAMIDE 10 MG: 10 TABLET ORAL at 16:57

## 2021-04-04 RX ADMIN — METOCLOPRAMIDE 10 MG: 10 TABLET ORAL at 02:06

## 2021-04-04 RX ADMIN — INSULIN GLARGINE 80 UNITS: 100 INJECTION, SOLUTION SUBCUTANEOUS at 09:31

## 2021-04-04 RX ADMIN — OXYCODONE 7.5 MG: 5 TABLET ORAL at 10:34

## 2021-04-04 RX ADMIN — PANTOPRAZOLE SODIUM 40 MG: 40 TABLET, DELAYED RELEASE ORAL at 02:06

## 2021-04-04 RX ADMIN — SUCRALFATE 1 G: 1 TABLET ORAL at 20:30

## 2021-04-04 RX ADMIN — MAGNESIUM OXIDE 400 MG (241.3 MG MAGNESIUM) TABLET 400 MG: TABLET at 09:28

## 2021-04-04 RX ADMIN — SUCRALFATE 1 G: 1 TABLET ORAL at 10:35

## 2021-04-04 RX ADMIN — INSULIN GLARGINE 50 UNITS: 100 INJECTION, SOLUTION SUBCUTANEOUS at 20:29

## 2021-04-04 NOTE — PLAN OF CARE
Pt medicated prn for pain, given insulin with meals, given ivf, doing well and tolerating diet post egd, vss, will ctm.

## 2021-04-04 NOTE — PLAN OF CARE
Goal Outcome Evaluation:  Plan of Care Reviewed With: patient  Progress: improving  Outcome Summary: Pt tolerated treatment well this date. Increased gait distance to 60ft w/ Rw and CGA. Instructed pt on a few seated exercises, and encouraged her to ambulate w/ nsg during the day. Patient was intermittently wearing a face mask during this therapy encounter. Therapist used appropriate personal protective equipment including eye protection, mask, and gloves.  Mask used was standard procedure mask. Appropriate PPE was worn during the entire therapy session. Hand hygiene was completed before and after therapy session. Patient is not in enhanced droplet precautions.

## 2021-04-04 NOTE — NURSING NOTE
bs checked, 65, nurse gave patient food and drinks to bring blood sugar up will recheck bs after eating. Will continue to monitor.

## 2021-04-04 NOTE — PROGRESS NOTES
Name: Silvia Zabala ADMIT: 2021   : 1960  PCP: Kulwant Martínez APRN    MRN: [id number] LOS: 0 days   AGE/SEX: 60 y.o. female  ROOM: [facility]     Subjective   Subjective    Complaining of constipation today  Low blood sugar  Still some discomfort with swallowing    Review of Systems   Constitutional: Negative for fever.   HENT: Positive for trouble swallowing.    Respiratory: Negative for cough and shortness of breath.    Cardiovascular: Negative for chest pain.   Gastrointestinal: Positive for abdominal pain and constipation.      Objective   Objective   Vital Signs  Temp:  [98 °F (36.7 °C)-98.3 °F (36.8 °C)] 98 °F (36.7 °C)  Heart Rate:  [87-99] 92  Resp:  [16-18] 18  BP: (126-146)/(63-76) 140/71  SpO2:  [96 %-100 %] 100 %  on   ;   Device (Oxygen Therapy): room air  Body mass index is 28.19 kg/m².    Physical Exam  Vitals and nursing note reviewed.   Constitutional:       General: She is not in acute distress.  HENT:      Head: Normocephalic and atraumatic.   Cardiovascular:      Rate and Rhythm: Normal rate and regular rhythm.   Pulmonary:      Effort: Pulmonary effort is normal. No respiratory distress.      Breath sounds: Normal breath sounds.   Abdominal:      General: Bowel sounds are normal. There is no distension.      Palpations: Abdomen is soft.      Tenderness: There is no abdominal tenderness. There is no guarding or rebound.   Musculoskeletal:         General: No swelling.   Skin:     General: Skin is warm and dry.   Neurological:      General: No focal deficit present.      Mental Status: She is alert and oriented to person, place, and time.   Psychiatric:         Mood and Affect: Mood normal.         Behavior: Behavior normal.     Results Review  I reviewed the patient's new clinical results.  Results from last 7 days   Lab Units 21  0525 21  1714   WBC 10*3/mm3 4.08 4.71   HEMOGLOBIN g/dL 11.2* 13.3   PLATELETS 10*3/mm3 95* 107*     Results from last 7 days   Lab Units  04/02/21  0525 04/01/21  1714   SODIUM mmol/L 132* 130*   POTASSIUM mmol/L 4.3 4.1   CHLORIDE mmol/L 101 94*   CO2 mmol/L 21.4* 21.6*   BUN mg/dL 20 23*   CREATININE mg/dL 0.82 1.05*   GLUCOSE mg/dL 367* 606*     Estimated Creatinine Clearance: 82.2 mL/min (by C-G formula based on SCr of 0.82 mg/dL).    Results from last 7 days   Lab Units 04/02/21  0525 04/01/21  1714   ALBUMIN g/dL 3.20* 3.90   BILIRUBIN mg/dL 1.4* 2.2*   ALK PHOS U/L 141* 180*   AST (SGOT) U/L 24 28   ALT (SGPT) U/L 23 29     Results from last 7 days   Lab Units 04/02/21  0525 04/01/21  1714   CALCIUM mg/dL 8.5* 9.6   ALBUMIN g/dL 3.20* 3.90   MAGNESIUM mg/dL  --  1.7     Results from last 7 days   Lab Units 04/02/21 0525 04/01/21 2041 04/01/21  1735 04/01/21  1714   PROCALCITONIN ng/mL  --   --   --  0.34*   LACTATE mmol/L 1.3 2.7* 3.3*  --      Glucose   Date/Time Value Ref Range Status   04/04/2021 0624 224 (H) 70 - 130 mg/dL Final   04/04/2021 0136 65 (L) 70 - 130 mg/dL Final   04/03/2021 2104 204 (H) 70 - 130 mg/dL Final   04/03/2021 1602 142 (H) 70 - 130 mg/dL Final   04/03/2021 1126 88 70 - 130 mg/dL Final   04/03/2021 0606 87 70 - 130 mg/dL Final   04/02/2021 2051 97 70 - 130 mg/dL Final     Scheduled Meds  cholestyramine light, 8 g, Oral, BID  docusate sodium, 100 mg, Oral, Daily  ferrous sulfate, 325 mg, Oral, Daily With Breakfast  fluconazole, 100 mg, Oral, Q24H  FLUoxetine, 10 mg, Oral, Daily  hydrOXYzine, 50 mg, Oral, Nightly  insulin glargine, 80 Units, Subcutaneous, BID  insulin lispro, 0-9 Units, Subcutaneous, TID AC  insulin lispro, 60 Units, Subcutaneous, TID AC  levETIRAcetam, 500 mg, Oral, BID  magnesium oxide, 400 mg, Oral, Daily  metoclopramide, 10 mg, Oral, 4x Daily AC & at Bedtime  mirtazapine, 15 mg, Oral, Nightly  multivitamin with minerals, 1 tablet, Oral, Daily  pantoprazole, 40 mg, Oral, QAM  pregabalin, 75 mg, Oral, Q12H  riFAXIMin, 550 mg, Oral, BID  sodium chloride, 10 mL, Intravenous, Q12H  sucralfate, 1 g,  Oral, 4x Daily AC & at Bedtime  vitamin B-12, 1,000 mcg, Oral, Daily  vitamin D, 50,000 Units, Oral, Q7 Days    Continuous Infusions  lactated ringers, 30 mL/hr  sodium chloride, 125 mL/hr, Last Rate: 125 mL/hr (04/04/21 0934)  sodium chloride, 30 mL/hr, Last Rate: Stopped (04/03/21 1058)    PRN Meds  •  ALPRAZolam  •  dextrose  •  dextrose  •  furosemide  •  glucagon (human recombinant)  •  nitroglycerin  •  oxyCODONE  •  [COMPLETED] Insert peripheral IV **AND** sodium chloride  •  sodium chloride  •  sodium chloride  •  spironolactone    lactated ringers, 30 mL/hr  sodium chloride, 125 mL/hr, Last Rate: 125 mL/hr (04/04/21 0934)  sodium chloride, 30 mL/hr, Last Rate: Stopped (04/03/21 1058)    Diet  Diet Regular; Consistent Carbohydrate     I personally reviewed KUB    Intake/Output Summary (Last 24 hours) at 4/4/2021 1103  Last data filed at 4/4/2021 0908  Gross per 24 hour   Intake 3640.41 ml   Output --   Net 3640.41 ml    Weight change:       Assessment/Plan     Active Hospital Problems    Diagnosis  POA   • **Uncontrolled diabetes mellitus with hyperglycemia (CMS/HCC) [E11.65]  Yes   • Elevated procalcitonin [R79.89]  Unknown   • Dehydration [E86.0]  Yes   • BRICE (obstructive sleep apnea) [G47.33]  Yes   • Intractable vomiting with nausea [R11.2]  Yes   • 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     60 y.o. female admitted with weakness and uncontrolled diabetes.    · Diabetes with hyperglycemia, uncontrolled  · Now with hypoglycemia.   · Decrease long-acting and mealtime insulin  · Continue correctional  · She had a continuous glucose monitor but switched to fingerstick due to some issue with supply at home  · She has ollow-up with Dr. Burnett April 22  · Dehydration due to hyperglycemia, GI losses  · Improved with fluids.  · Lactate elevation resolved  · Nausea and vomiting, history  of gastroparesis  · She is on scheduled Reglan  · Status post EGD 4/3/21 Candidiasis esophagitis: Diflucan  · Await biopsies  · DUKES, cirrhosis  · Large amount of ascites noted on KUB  · Back on diuretics- at home she was on them as needed- change to daily for now. I/O, daily weights  · Rifaximin  · Chronic thrombocytopenia from liver disease  · Elevated procalcitonin  · Appreciate ID.  No infectious work-up  · SCDs for DVT prophylaxis  · Full code  · Discussed with patient and nursing staff  · Anticipate discharge home in 1-2 days.    Dontae Brower MD  [facility]  04/04/21  10:54 EDT    I wore protective equipment throughout this patient encounter including a face mask, gloves, and protective eyewear.  Hand hygiene was performed before donning protective equipment and after removal when leaving the room.

## 2021-04-04 NOTE — PLAN OF CARE
Goal Outcome Evaluation:     Progress: improving  Outcome Summary: Pt AOx4. On RA. Fluids d/c this am. Decreased daily lantus adn scheduled insulin. Diuretics changed to daily instead of prn. Now a dw. Medicated for pain per MAR. Up to the [facility] standby. PO fungal given per MAR. Small BM this morning but pt c/o constipation. No acute distress will continue to monitor.

## 2021-04-04 NOTE — PLAN OF CARE
Goal Outcome Evaluation:         Patient alert follows commands turns in bed. Prn pain and anxiety meds given. Monitoring blood glucose closely. md sanchez for heaitng pad from home to be used prn. Turns self in bed. No nausea noted. Pills given with milk. No acute distress noted will continue to monitor

## 2021-04-04 NOTE — PROGRESS NOTES
[facility]  Inpatient Progress Note    Reason for Follow Up: Gastroparesis, nausea with vomiting, DUKES cirrhosis    Subjective     Interval History: Upper endoscopy revealed esophagitis, gastritis.  Brushings and biopsies are pending.  Patient states symptoms are improving, she is eating breakfast.      Current Facility-Administered Medications:   •  ALPRAZolam (XANAX) tablet 0.5 mg, 0.5 mg, Oral, BID PRN, Endy Vasquez MD, 0.5 mg at 04/03/21 2027  •  cholestyramine light packet 8 g, 8 g, Oral, BID, Endy Vasquez MD, 8 g at 04/03/21 2028  •  dextrose (D50W) 25 g/ 50mL Intravenous Solution 25 g, 25 g, Intravenous, Q15 Min PRN, Endy Vasquez MD  •  dextrose (GLUTOSE) oral gel 15 g, 15 g, Oral, Q15 Min PRN, Endy Vasquez MD  •  docusate sodium (COLACE) capsule 100 mg, 100 mg, Oral, Daily, Endy Vasquez MD, 100 mg at 04/03/21 1202  •  ferrous sulfate tablet 325 mg, 325 mg, Oral, Daily With Breakfast, Endy Vasquez MD, 325 mg at 04/03/21 1202  •  fluconazole (DIFLUCAN) tablet 100 mg, 100 mg, Oral, Q24H, Endy Vasquez MD, 100 mg at 04/03/21 1501  •  FLUoxetine (PROzac) capsule 10 mg, 10 mg, Oral, Daily, Endy Vasquez MD, 10 mg at 04/03/21 1203  •  furosemide (LASIX) tablet 80 mg, 80 mg, Oral, Daily PRN, Dontae Brower MD  •  glucagon (human recombinant) (GLUCAGEN DIAGNOSTIC) injection 1 mg, 1 mg, Subcutaneous, Q15 Min PRN, Endy Vasquez MD  •  hydrOXYzine (ATARAX) tablet 50 mg, 50 mg, Oral, Nightly, Endy Vasquez MD, 50 mg at 04/03/21 2027  •  insulin glargine (LANTUS, SEMGLEE) injection 80 Units, 80 Units, Subcutaneous, BID, Endy Vasquez MD, 80 Units at 04/03/21 2301  •  insulin lispro (ADMELOG) injection 0-9 Units, 0-9 Units, Subcutaneous, TID Christina JAMISON Kevin, MD, 4 Units at 04/02/21 1721  •  insulin lispro (ADMELOG) injection 60 Units, 60 Units, Subcutaneous, TID Christina JAMISON Kevin, MD, 60 Units at 04/03/21 1806  •  lactated ringers infusion, 30 mL/hr, Intravenous, Continuous, Jason,  Logan SHIPLEY MD  •  levETIRAcetam (KEPPRA) tablet 500 mg, 500 mg, Oral, BID, Endy Vasquez MD, 500 mg at 04/03/21 2026  •  magnesium oxide (MAG-OX) tablet 400 mg, 400 mg, Oral, Daily, Endy Vasquez MD, 400 mg at 04/03/21 1202  •  metoclopramide (REGLAN) tablet 10 mg, 10 mg, Oral, 4x Daily AC & at Bedtime, Endy Vasquez MD, 10 mg at 04/04/21 0206  •  mirtazapine (REMERON) tablet 15 mg, 15 mg, Oral, Nightly, Endy Vasquez MD, 15 mg at 04/03/21 2026  •  multivitamin with minerals 1 tablet, 1 tablet, Oral, Daily, Endy Vasquez MD, 1 tablet at 04/03/21 1203  •  nitroglycerin (NITROSTAT) SL tablet 0.4 mg, 0.4 mg, Sublingual, Q5 Min PRN, Endy Vasquez MD  •  oxyCODONE (ROXICODONE) immediate release tablet 7.5 mg, 7.5 mg, Oral, Q8H PRN, Endy Vasquez MD, 7.5 mg at 04/04/21 0205  •  pantoprazole (PROTONIX) EC tablet 40 mg, 40 mg, Oral, QAM, Endy Vasquez MD, 40 mg at 04/04/21 0206  •  pregabalin (LYRICA) capsule 75 mg, 75 mg, Oral, Q12H, Endy Vasquez MD, 75 mg at 04/03/21 2026  •  riFAXIMin (XIFAXAN) tablet 550 mg, 550 mg, Oral, BID, Endy Vasquez MD, 550 mg at 04/03/21 2027  •  [COMPLETED] Insert peripheral IV, , , Once **AND** sodium chloride 0.9 % flush 10 mL, 10 mL, Intravenous, PRN, Endy Vasquez MD  •  sodium chloride 0.9 % flush 10 mL, 10 mL, Intravenous, Q12H, Endy Vasquez MD, 10 mL at 04/03/21 2027  •  sodium chloride 0.9 % flush 10 mL, 10 mL, Intravenous, PRN, Endy Vasquez MD  •  sodium chloride 0.9 % infusion, 125 mL/hr, Intravenous, Continuous, Endy Vasquez MD, Last Rate: 125 mL/hr at 04/04/21 0645, 125 mL/hr at 04/04/21 0645  •  sodium chloride 0.9 % infusion, 30 mL/hr, Intravenous, Continuous PRN, Endy Vasquez MD, Stopped at 04/03/21 1058  •  spironolactone (ALDACTONE) tablet 100 mg, 100 mg, Oral, Daily PRN, Dontae Brower MD  •  sucralfate (CARAFATE) tablet 1 g, 1 g, Oral, 4x Daily AC & at Bedtime, Endy Vasquez MD, 1 g at 04/04/21 0206  •  vitamin B-12 (CYANOCOBALAMIN) tablet 1,000 mcg, 1,000 mcg, Oral,  Daily, Endy Vasquez MD, 1,000 mcg at 04/03/21 1202  •  vitamin D (ERGOCALCIFEROL) capsule 50,000 Units, 50,000 Units, Oral, Q7 Days, Endy Vasquez MD, 50,000 Units at 04/02/21 0835  Review of Systems:    All systems were reviewed and negative except for:  Gastrointestinal: positive for  See HPI    Objective     Vital Signs  Temp:  [98 °F (36.7 °C)-98.3 °F (36.8 °C)] 98 °F (36.7 °C)  Heart Rate:  [85-99] 92  Resp:  [16-18] 18  BP: (108-146)/(63-76) 140/71  Body mass index is 28.19 kg/m².    Intake/Output Summary (Last 24 hours) at 4/4/2021 0914  Last data filed at 4/4/2021 0645  Gross per 24 hour   Intake 4050.41 ml   Output --   Net 4050.41 ml     No intake/output data recorded.     Physical Exam:   General: patient awake, alert and cooperative   Eyes: Normal lids and lashes, no scleral icterus   Neck: supple, normal ROM   Skin: warm and dry, not jaundiced   Cardiovascular: regular rhythm and rate, no murmurs auscultated   Pulm: clear to auscultation bilaterally, regular and unlabored   Abdomen: soft, nontender, nondistended; normal bowel sounds   Extremities: no rash or edema   Psychiatric: Normal mood and behavior; memory intact     Results Review:     I reviewed the patient's new clinical results.    Results from last 7 days   Lab Units 04/02/21  0525 04/01/21  1714   WBC 10*3/mm3 4.08 4.71   HEMOGLOBIN g/dL 11.2* 13.3   HEMATOCRIT % 32.3* 39.2   PLATELETS 10*3/mm3 95* 107*     Results from last 7 days   Lab Units 04/02/21  0525 04/01/21  1714   SODIUM mmol/L 132* 130*   POTASSIUM mmol/L 4.3 4.1   CHLORIDE mmol/L 101 94*   CO2 mmol/L 21.4* 21.6*   BUN mg/dL 20 23*   CREATININE mg/dL 0.82 1.05*   CALCIUM mg/dL 8.5* 9.6   BILIRUBIN mg/dL 1.4* 2.2*   ALK PHOS U/L 141* 180*   ALT (SGPT) U/L 23 29   AST (SGOT) U/L 24 28   GLUCOSE mg/dL 367* 606*     Results from last 7 days   Lab Units 04/01/21  1714   INR  1.33*     Lab Results   Lab Value Date/Time    LIPASE 11 (L) 04/01/2021 1714    LIPASE 14 02/03/2021 1139     LIPASE 8 (L) 05/09/2019 2043    LIPASE 9 (L) 01/17/2019 1259    LIPASE 8 (L) 12/13/2018 1900    LIPASE 12 (L) 10/02/2018 2332    LIPASE 9 (L) 05/18/2018 0425    LIPASE 22 12/16/2017 1650    LIPASE 10 (L) 10/30/2017 1732    LIPASE 9 (L) 02/21/2017 1202    LIPASE 16 11/10/2014 0724       Radiology:  XR Abdomen KUB   Final Result      XR Abdomen 2+ VW with Chest 1 VW   Final Result          Assessment/Plan     Patient Active Problem List   Diagnosis   • Cirrhosis of liver (CMS/HCC)   • Rheumatoid arthritis (CMS/HCC)   • Anxiety and depression   • Type 2 diabetes mellitus, uncontrolled, with neuropathy (CMS/HCC)   • Fibrositis   • Change in blood platelet count   • Pancytopenia (CMS/HCC)   • Hypersplenism   • Nausea & vomiting   • DUKES (nonalcoholic steatohepatitis)   • Hyperlipidemia   • Abdominal pain   • Hematemesis   • Ascites   • Portal hypertension (CMS/HCC)   • Systemic lupus (CMS/HCC)   • Secondary esophageal varices without bleeding (CMS/HCC)   • Esophageal varices with bleeding (CMS/HCC)   • Gastroparesis   • 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 (CMS/HCC)   • Diabetic ketoacidosis without coma associated with type 2 diabetes mellitus (CMS/HCC)   • Diabetic peripheral neuropathy (CMS/HCC)   • History of seizure disorder   • Hepatic encephalopathy (CMS/HCC)   • Thrombocytopenia (CMS/HCC)   • Fever   • Acute ITP (CMS/HCC)   • Degeneration of lumbosacral intervertebral disc   • Seizure (CMS/HCC)   • Spondylosis without myelopathy   • Intractable vomiting with nausea   • UGI bleed   • Migraine without aura   • Urinary retention   • Uncontrolled diabetes mellitus with hyperglycemia (CMS/HCC)   • BRICE (obstructive sleep apnea)   • Gastroparesis   • Hyperammonemia (CMS/HCC)   • Hyponatremia   • Anemia   • Vitamin D insufficiency   • Hyperglycemia   • Dehydration   • Iron deficiency anemia   • Adverse effect of iron or its compound, subsequent encounter   • Hemorrhage of anus and rectum   • Vulvar lesion   • Acute upper GI  bleed   • Acute blood loss anemia   • Acute hyperkalemia   • Labial cyst   • Transaminitis   • Hyperbilirubinemia   • Acute gastritis   • UTI (urinary tract infection), bacterial   • UTI (urinary tract infection)   • Cholestatic pruritus   • Fatty liver disease, nonalcoholic   • Protein-calorie malnutrition, moderate (CMS/HCC)   • Elevated procalcitonin       Assessment:  1. Nausea with vomiting: Known gastroparesis.  EGD negative for any outlet obstruction  2. DUKES cirrhosis: No variceal changes but portal hypertensive gastropathy noted  3. Diabetes mellitus  4. Seizure disorder  5. Morbid obesity  6. Sleep apnea      Plan:  · Continue current medical regimen  · Await biopsies and brushings from endoscopy.  I discussed the patients findings and my recommendations with patient.    Logan Gomez MD

## 2021-04-05 ENCOUNTER — APPOINTMENT (OUTPATIENT)
Dept: ULTRASOUND IMAGING | Facility: HOSPITAL | Age: 61
End: 2021-04-05

## 2021-04-05 PROBLEM — R18.8 ASCITES: Status: ACTIVE | Noted: 2021-04-05

## 2021-04-05 LAB
ANION GAP SERPL CALCULATED.3IONS-SCNC: 7.2 MMOL/L (ref 5–15)
BUN SERPL-MCNC: 12 MG/DL (ref 6–20)
BUN/CREAT SERPL: 16.2 (ref 7–25)
CALCIUM SPEC-SCNC: 8.2 MG/DL (ref 8.6–10.5)
CHLORIDE SERPL-SCNC: 109 MMOL/L (ref 98–107)
CO2 SERPL-SCNC: 21.8 MMOL/L (ref 22–29)
CREAT SERPL-MCNC: 0.74 MG/DL (ref 0.57–1)
GFR SERPL CREATININE-BSD FRML MDRD: 81 ML/MIN/1.73
GLUCOSE BLDC GLUCOMTR-MCNC: 121 MG/DL (ref 70–130)
GLUCOSE BLDC GLUCOMTR-MCNC: 243 MG/DL (ref 70–130)
GLUCOSE BLDC GLUCOMTR-MCNC: 270 MG/DL (ref 70–130)
GLUCOSE BLDC GLUCOMTR-MCNC: 321 MG/DL (ref 70–130)
GLUCOSE SERPL-MCNC: 143 MG/DL (ref 65–99)
INR PPP: 1.32 (ref 0.9–1.1)
POTASSIUM SERPL-SCNC: 4.3 MMOL/L (ref 3.5–5.2)
PROTHROMBIN TIME: 16.1 SECONDS (ref 11.7–14.2)
SODIUM SERPL-SCNC: 138 MMOL/L (ref 136–145)

## 2021-04-05 PROCEDURE — 76942 ECHO GUIDE FOR BIOPSY: CPT

## 2021-04-05 PROCEDURE — 80048 BASIC METABOLIC PNL TOTAL CA: CPT | Performed by: HOSPITALIST

## 2021-04-05 PROCEDURE — 97110 THERAPEUTIC EXERCISES: CPT

## 2021-04-05 PROCEDURE — 63710000001 INSULIN GLARGINE PER 5 UNITS: Performed by: HOSPITALIST

## 2021-04-05 PROCEDURE — 63710000001 INSULIN LISPRO (HUMAN) PER 5 UNITS: Performed by: INTERNAL MEDICINE

## 2021-04-05 PROCEDURE — 85610 PROTHROMBIN TIME: CPT | Performed by: HOSPITALIST

## 2021-04-05 PROCEDURE — 82962 GLUCOSE BLOOD TEST: CPT

## 2021-04-05 PROCEDURE — 99232 SBSQ HOSP IP/OBS MODERATE 35: CPT | Performed by: NURSE PRACTITIONER

## 2021-04-05 PROCEDURE — 63710000001 INSULIN LISPRO (HUMAN) PER 5 UNITS: Performed by: HOSPITALIST

## 2021-04-05 RX ORDER — LIDOCAINE HYDROCHLORIDE 10 MG/ML
10 INJECTION, SOLUTION INFILTRATION; PERINEURAL ONCE
Status: DISCONTINUED | OUTPATIENT
Start: 2021-04-05 | End: 2021-04-06 | Stop reason: HOSPADM

## 2021-04-05 RX ORDER — POLYETHYLENE GLYCOL 3350 17 G/17G
17 POWDER, FOR SOLUTION ORAL DAILY
Status: DISCONTINUED | OUTPATIENT
Start: 2021-04-05 | End: 2021-04-06 | Stop reason: HOSPADM

## 2021-04-05 RX ORDER — LUBIPROSTONE 24 UG/1
24 CAPSULE ORAL 2 TIMES DAILY WITH MEALS
Status: DISCONTINUED | OUTPATIENT
Start: 2021-04-05 | End: 2021-04-06 | Stop reason: HOSPADM

## 2021-04-05 RX ORDER — FLUCONAZOLE 200 MG/1
200 TABLET ORAL EVERY 24 HOURS
Status: DISCONTINUED | OUTPATIENT
Start: 2021-04-05 | End: 2021-04-06 | Stop reason: HOSPADM

## 2021-04-05 RX ORDER — ALBUMIN (HUMAN) 12.5 G/50ML
12.5 SOLUTION INTRAVENOUS ONCE
Status: DISCONTINUED | OUTPATIENT
Start: 2021-04-05 | End: 2021-04-06 | Stop reason: HOSPADM

## 2021-04-05 RX ADMIN — METOCLOPRAMIDE 10 MG: 10 TABLET ORAL at 17:35

## 2021-04-05 RX ADMIN — FLUCONAZOLE 200 MG: 200 TABLET ORAL at 17:35

## 2021-04-05 RX ADMIN — SUCRALFATE 1 G: 1 TABLET ORAL at 06:09

## 2021-04-05 RX ADMIN — SUCRALFATE 1 G: 1 TABLET ORAL at 21:10

## 2021-04-05 RX ADMIN — SODIUM CHLORIDE, PRESERVATIVE FREE 10 ML: 5 INJECTION INTRAVENOUS at 21:11

## 2021-04-05 RX ADMIN — SPIRONOLACTONE 100 MG: 100 TABLET, FILM COATED ORAL at 08:26

## 2021-04-05 RX ADMIN — CHOLESTYRAMINE 8 G: 4 POWDER, FOR SUSPENSION ORAL at 08:26

## 2021-04-05 RX ADMIN — INSULIN GLARGINE 50 UNITS: 100 INJECTION, SOLUTION SUBCUTANEOUS at 21:10

## 2021-04-05 RX ADMIN — INSULIN LISPRO 40 UNITS: 100 INJECTION, SOLUTION INTRAVENOUS; SUBCUTANEOUS at 08:26

## 2021-04-05 RX ADMIN — PREGABALIN 75 MG: 75 CAPSULE ORAL at 08:25

## 2021-04-05 RX ADMIN — DOCUSATE SODIUM 100 MG: 100 CAPSULE, LIQUID FILLED ORAL at 08:25

## 2021-04-05 RX ADMIN — METOCLOPRAMIDE 10 MG: 10 TABLET ORAL at 06:09

## 2021-04-05 RX ADMIN — HYDROXYZINE HYDROCHLORIDE 50 MG: 50 TABLET ORAL at 21:09

## 2021-04-05 RX ADMIN — RIFAXIMIN 550 MG: 550 TABLET ORAL at 08:26

## 2021-04-05 RX ADMIN — INSULIN LISPRO 40 UNITS: 100 INJECTION, SOLUTION INTRAVENOUS; SUBCUTANEOUS at 17:36

## 2021-04-05 RX ADMIN — OXYCODONE 7.5 MG: 5 TABLET ORAL at 06:37

## 2021-04-05 RX ADMIN — METOCLOPRAMIDE 10 MG: 10 TABLET ORAL at 21:10

## 2021-04-05 RX ADMIN — PANTOPRAZOLE SODIUM 40 MG: 40 TABLET, DELAYED RELEASE ORAL at 06:10

## 2021-04-05 RX ADMIN — INSULIN LISPRO 4 UNITS: 100 INJECTION, SOLUTION INTRAVENOUS; SUBCUTANEOUS at 11:43

## 2021-04-05 RX ADMIN — LUBIPROSTONE 24 MCG: 24 CAPSULE, GELATIN COATED ORAL at 17:36

## 2021-04-05 RX ADMIN — ALPRAZOLAM 0.5 MG: 0.5 TABLET ORAL at 23:52

## 2021-04-05 RX ADMIN — SUCRALFATE 1 G: 1 TABLET ORAL at 17:35

## 2021-04-05 RX ADMIN — LEVETIRACETAM 500 MG: 500 TABLET, FILM COATED ORAL at 21:10

## 2021-04-05 RX ADMIN — MAGNESIUM OXIDE 400 MG (241.3 MG MAGNESIUM) TABLET 400 MG: TABLET at 08:26

## 2021-04-05 RX ADMIN — LEVETIRACETAM 500 MG: 500 TABLET, FILM COATED ORAL at 08:26

## 2021-04-05 RX ADMIN — POLYETHYLENE GLYCOL 3350 17 G: 17 POWDER, FOR SOLUTION ORAL at 11:43

## 2021-04-05 RX ADMIN — FLUOXETINE HYDROCHLORIDE 10 MG: 10 CAPSULE ORAL at 08:26

## 2021-04-05 RX ADMIN — OXYCODONE 7.5 MG: 5 TABLET ORAL at 15:43

## 2021-04-05 RX ADMIN — FUROSEMIDE 80 MG: 80 TABLET ORAL at 08:26

## 2021-04-05 RX ADMIN — INSULIN LISPRO 6 UNITS: 100 INJECTION, SOLUTION INTRAVENOUS; SUBCUTANEOUS at 17:36

## 2021-04-05 RX ADMIN — FERROUS SULFATE TAB 325 MG (65 MG ELEMENTAL FE) 325 MG: 325 (65 FE) TAB at 08:26

## 2021-04-05 RX ADMIN — INSULIN LISPRO 40 UNITS: 100 INJECTION, SOLUTION INTRAVENOUS; SUBCUTANEOUS at 11:43

## 2021-04-05 RX ADMIN — SODIUM CHLORIDE, PRESERVATIVE FREE 10 ML: 5 INJECTION INTRAVENOUS at 08:36

## 2021-04-05 RX ADMIN — INSULIN GLARGINE 50 UNITS: 100 INJECTION, SOLUTION SUBCUTANEOUS at 08:31

## 2021-04-05 RX ADMIN — SUCRALFATE 1 G: 1 TABLET ORAL at 11:43

## 2021-04-05 RX ADMIN — Medication 1 TABLET: at 08:26

## 2021-04-05 RX ADMIN — METOCLOPRAMIDE 10 MG: 10 TABLET ORAL at 11:43

## 2021-04-05 RX ADMIN — MIRTAZAPINE 15 MG: 15 TABLET, FILM COATED ORAL at 21:10

## 2021-04-05 RX ADMIN — CHOLESTYRAMINE 8 G: 4 POWDER, FOR SUSPENSION ORAL at 21:10

## 2021-04-05 RX ADMIN — PREGABALIN 75 MG: 75 CAPSULE ORAL at 21:09

## 2021-04-05 RX ADMIN — Medication 1000 MCG: at 08:26

## 2021-04-05 NOTE — PROGRESS NOTES
Gastroenterology   Inpatient Progress Note    Reason for Follow Up:  Gastroparesis, nausea, vomiting, Wong cirrhosis, constipation    Subjective  Interval History:   Patient reports worsening constipation.    She had small pellet stools overnight.  She has pain, located in the mid lower abdomen described as a dull ache and cramping.        She has used MiraLAX in the past but has difficulty having regular bowel movements as this was not effective by itself.  She has also used Linzess in the past many years ago but is uncertain of the response.  Also prior use of lactulose was not tolerated given cramping and abdominal pain.    She is on cholestyramine for pruritus.  No episodes of vomiting but does experience nausea at times.    Current Facility-Administered Medications:   •  ALPRAZolam (XANAX) tablet 0.5 mg, 0.5 mg, Oral, BID PRN, Endy Vasquez MD, 0.5 mg at 04/04/21 2225  •  cholestyramine light packet 8 g, 8 g, Oral, BID, Endy Vasquez MD, 8 g at 04/05/21 0826  •  dextrose (D50W) 25 g/ 50mL Intravenous Solution 25 g, 25 g, Intravenous, Q15 Min PRN, Endy Vasquez MD  •  dextrose (GLUTOSE) oral gel 15 g, 15 g, Oral, Q15 Min PRN, Endy Vasquez MD  •  docusate sodium (COLACE) capsule 100 mg, 100 mg, Oral, Daily, Endy Vasquez MD, 100 mg at 04/05/21 0825  •  ferrous sulfate tablet 325 mg, 325 mg, Oral, Daily With Breakfast, Endy Vasquez MD, 325 mg at 04/05/21 0826  •  fluconazole (DIFLUCAN) tablet 100 mg, 100 mg, Oral, Q24H, Endy Vasquez MD, 100 mg at 04/04/21 1220  •  FLUoxetine (PROzac) capsule 10 mg, 10 mg, Oral, Daily, Endy Vasquez MD, 10 mg at 04/05/21 0826  •  furosemide (LASIX) tablet 80 mg, 80 mg, Oral, Daily, Dontae Brower MD, 80 mg at 04/05/21 0826  •  glucagon (human recombinant) (GLUCAGEN DIAGNOSTIC) injection 1 mg, 1 mg, Subcutaneous, Q15 Min PRN, Endy Vasquez MD  •  hydrOXYzine (ATARAX) tablet 50 mg, 50 mg, Oral, Nightly, Endy Vasquez MD, 50 mg at 04/04/21 2030  •  insulin glargine (LANTUS,  SEMGLEE) injection 50 Units, 50 Units, Subcutaneous, BID, Dontae Brower MD, 50 Units at 04/05/21 0831  •  insulin lispro (ADMELOG) injection 0-9 Units, 0-9 Units, Subcutaneous, TID AC, Endy Vasquez MD, 4 Units at 04/04/21 1224  •  insulin lispro (ADMELOG) injection 40 Units, 40 Units, Subcutaneous, TID SHAHEEN, Dontae Brower MD, 40 Units at 04/05/21 0826  •  levETIRAcetam (KEPPRA) tablet 500 mg, 500 mg, Oral, BID, Endy Vasquez MD, 500 mg at 04/05/21 0826  •  magnesium oxide (MAG-OX) tablet 400 mg, 400 mg, Oral, Daily, Endy Vasquez MD, 400 mg at 04/05/21 0826  •  metoclopramide (REGLAN) tablet 10 mg, 10 mg, Oral, 4x Daily AC & at Bedtime, Endy Vasquez MD, 10 mg at 04/05/21 0609  •  mirtazapine (REMERON) tablet 15 mg, 15 mg, Oral, Nightly, Endy Vasquez MD, 15 mg at 04/04/21 2030  •  multivitamin with minerals 1 tablet, 1 tablet, Oral, Daily, Endy Vasquez MD, 1 tablet at 04/05/21 0826  •  nitroglycerin (NITROSTAT) SL tablet 0.4 mg, 0.4 mg, Sublingual, Q5 Min PRN, Endy Vasquez MD  •  oxyCODONE (ROXICODONE) immediate release tablet 7.5 mg, 7.5 mg, Oral, Q8H PRN, Endy Vasquez MD, 7.5 mg at 04/05/21 0637  •  pantoprazole (PROTONIX) EC tablet 40 mg, 40 mg, Oral, QAM, Endy Vasquez MD, 40 mg at 04/05/21 0610  •  pregabalin (LYRICA) capsule 75 mg, 75 mg, Oral, Q12H, Endy Vasquez MD, 75 mg at 04/05/21 0825  •  [COMPLETED] Insert peripheral IV, , , Once **AND** sodium chloride 0.9 % flush 10 mL, 10 mL, Intravenous, PRN, Endy Vasquez MD  •  sodium chloride 0.9 % flush 10 mL, 10 mL, Intravenous, Q12H, Endy Vasquez MD, 10 mL at 04/05/21 0836  •  sodium chloride 0.9 % flush 10 mL, 10 mL, Intravenous, PRN, Endy Vasquez MD  •  sodium chloride 0.9 % infusion, 30 mL/hr, Intravenous, Continuous PRN, Endy Vasquez MD, Stopped at 04/03/21 1058  •  spironolactone (ALDACTONE) tablet 100 mg, 100 mg, Oral, Daily, Dontae Brower MD, 100 mg at 04/05/21 0826  •  sucralfate (CARAFATE) tablet 1 g, 1 g, Oral, 4x Daily AC & at  Bedtime, Endy Vasquez MD, 1 g at 04/05/21 0609  •  vitamin B-12 (CYANOCOBALAMIN) tablet 1,000 mcg, 1,000 mcg, Oral, Daily, Endy Vasquez MD, 1,000 mcg at 04/05/21 0826  •  vitamin D (ERGOCALCIFEROL) capsule 50,000 Units, 50,000 Units, Oral, Q7 Days, Endy Vasquez MD, 50,000 Units at 04/02/21 0835  Review of Systems:               Gastroenterology positive for constipation and abdominal pain, swelling    Objective     Vital Signs  Temp:  [98 °F (36.7 °C)-98.6 °F (37 °C)] 98.6 °F (37 °C)  Heart Rate:  [] 104  Resp:  [16] 16  BP: (137-154)/(64-74) 146/72  Body mass index is 28.8 kg/m².                  Physical Exam:              General: patient awake, alert and cooperative, appears stated age              Eyes: no scleral icterus              Skin: warm and dry, not jaundiced              Abdomen: soft, normal bowel sounds, abdominal discomfort diffuse with light palpation, mild distention.              Psychiatric: Appropriate affect and behavior                Results Review:                I reviewed the patient's new clinical results.    Results from last 7 days   Lab Units 04/02/21  0525 04/01/21  1714   WBC 10*3/mm3 4.08 4.71   HEMOGLOBIN g/dL 11.2* 13.3   HEMATOCRIT % 32.3* 39.2   PLATELETS 10*3/mm3 95* 107*     Results from last 7 days   Lab Units 04/05/21  0501 04/02/21  0525 04/01/21  1714   SODIUM mmol/L 138 132* 130*   POTASSIUM mmol/L 4.3 4.3 4.1   CHLORIDE mmol/L 109* 101 94*   CO2 mmol/L 21.8* 21.4* 21.6*   BUN mg/dL 12 20 23*   CREATININE mg/dL 0.74 0.82 1.05*   CALCIUM mg/dL 8.2* 8.5* 9.6   BILIRUBIN mg/dL  --  1.4* 2.2*   ALK PHOS U/L  --  141* 180*   ALT (SGPT) U/L  --  23 29   AST (SGOT) U/L  --  24 28   GLUCOSE mg/dL 143* 367* 606*     Results from last 7 days   Lab Units 04/05/21  1034 04/01/21  1714   INR  1.32* 1.33*     Lab Results   Lab Value Date/Time    LIPASE 11 (L) 04/01/2021 1714    LIPASE 14 02/03/2021 1139    LIPASE 8 (L) 05/09/2019 2043       Radiology:  XR Abdomen KUB   Final  Result      XR Abdomen 2+ VW with Chest 1 VW   Final Result      US Paracentesis    (Results Pending)       Assessment/Plan     Patient Active Problem List   Diagnosis   • Cirrhosis of liver (CMS/HCC)   • Rheumatoid arthritis (CMS/HCC)   • Anxiety and depression   • Type 2 diabetes mellitus, uncontrolled, with neuropathy (CMS/HCC)   • Fibrositis   • Change in blood platelet count   • Pancytopenia (CMS/HCC)   • Hypersplenism   • Nausea & vomiting   • DUKES (nonalcoholic steatohepatitis)   • Hyperlipidemia   • Abdominal pain   • Hematemesis   • Ascites   • Portal hypertension (CMS/HCC)   • Systemic lupus (CMS/HCC)   • Secondary esophageal varices without bleeding (CMS/HCC)   • Esophageal varices with bleeding (CMS/HCC)   • Gastroparesis   • 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 (CMS/HCC)   • Diabetic ketoacidosis without coma associated with type 2 diabetes mellitus (CMS/HCC)   • Diabetic peripheral neuropathy (CMS/HCC)   • History of seizure disorder   • Hepatic encephalopathy (CMS/HCC)   • Thrombocytopenia (CMS/HCC)   • Fever   • Acute ITP (CMS/HCC)   • Degeneration of lumbosacral intervertebral disc   • Seizure (CMS/HCC)   • Spondylosis without myelopathy   • Intractable vomiting with nausea   • UGI bleed   • Migraine without aura   • Urinary retention   • Uncontrolled diabetes mellitus with hyperglycemia (CMS/HCC)   • BRICE (obstructive sleep apnea)   • Gastroparesis   • Hyperammonemia (CMS/HCC)   • Hyponatremia   • Anemia   • Vitamin D insufficiency   • Hyperglycemia   • Dehydration   • Iron deficiency anemia   • Adverse effect of iron or its compound, subsequent encounter   • Hemorrhage of anus and rectum   • Vulvar lesion   • Acute upper GI bleed   • Acute blood loss anemia   • Acute hyperkalemia   • Labial cyst   • Transaminitis   • Hyperbilirubinemia   • Acute gastritis   • UTI (urinary tract infection), bacterial   • UTI (urinary tract infection)   • Cholestatic pruritus   • Fatty liver disease, nonalcoholic   •  Protein-calorie malnutrition, moderate (CMS/HCC)   • Elevated procalcitonin   • Hyperglycemia due to type 1 diabetes mellitus (CMS/HCC)   • Ascites       Assessment:  1. Wong cirrhosis with ascites on KUB  2. Gastroparesis with nausea and vomiting  3. Chronic constipation  4. Portal hypertensive gastropathy noted on recent EGD        Plan:  · No evidence of outlet obstruction on recent EGD, continue metoclopramide and smaller more frequent meals for gastroparesis  · Multiple causes for constipation including chronic idiopathic constipation, delayed transit in a patient with gastroparesis and medication side effect would recommend continuing cholestyramine but using lowest dose possible for control of pruritus as this can cause constipation as discussed with patient and nurse during today's visit.  · For constipation, will add polyethylene glycol 17 g x 1 daily and Amitiza 24 mcg 1 tablet twice daily with food.  · Patient has previously been on Linzess years ago but does not remember the response.  She has been using MiraLAX at home with minimal improvement in symptoms.  Prior use of lactulose causes abdominal cramping and is not well tolerated.  · Wong cirrhosis with ascites, paracentesis planned with primary, will continue to monitor    I discussed the patients findings and my recommendations with patient and nursing staff.           Katheryn MALDONADO  [facility] [place] [id number]  Office: [phone]

## 2021-04-05 NOTE — THERAPY TREATMENT NOTE
Patient Name: Silvia Zabala  : 1960    MRN: [id number]                              Today's Date: 2021       Admit Date: 2021    Visit Dx:     ICD-10-CM ICD-9-CM   1. Hyperglycemia due to type 1 diabetes mellitus (CMS/HCC)  E10.65 250.01   2. Non-intractable vomiting with nausea, unspecified vomiting type  R11.2 787.01   3. Thrombocytopenia (CMS/HCC)  D69.6 287.5   4. Hyperbilirubinemia  E80.6 782.4   5. 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 Gastroparesis  K31.84 536.3   7. Nausea & vomiting  R11.2 787.01     Patient Active Problem List   Diagnosis   • Cirrhosis of liver (CMS/HCC)   • Rheumatoid arthritis (CMS/HCC)   • Anxiety and depression   • Type 2 diabetes mellitus, uncontrolled, with neuropathy (CMS/HCC)   • Fibrositis   • Change in blood platelet count   • Pancytopenia (CMS/HCC)   • Hypersplenism   • Nausea & vomiting   • DUKES (nonalcoholic steatohepatitis)   • Hyperlipidemia   • Abdominal pain   • Hematemesis   • Ascites   • Portal hypertension (CMS/HCC)   • Systemic lupus (CMS/HCC)   • Secondary esophageal varices without bleeding (CMS/HCC)   • Esophageal varices with bleeding (CMS/HCC)   • Gastroparesis   • 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 (CMS/HCC)   • Diabetic ketoacidosis without coma associated with type 2 diabetes mellitus (CMS/HCC)   • Diabetic peripheral neuropathy (CMS/HCC)   • History of seizure disorder   • Hepatic encephalopathy (CMS/HCC)   • Thrombocytopenia (CMS/HCC)   • Fever   • Acute ITP (CMS/HCC)   • Degeneration of lumbosacral intervertebral disc   • Seizure (CMS/HCC)   • Spondylosis without myelopathy   • Intractable vomiting with nausea   • UGI bleed   • Migraine without aura   • Urinary retention   • Uncontrolled diabetes mellitus with hyperglycemia (CMS/HCC)   • BRICE (obstructive sleep apnea)   • Gastroparesis   • Hyperammonemia (CMS/HCC)   • Hyponatremia   • Anemia   • Vitamin D insufficiency   • Hyperglycemia   • Dehydration    • Iron deficiency anemia   • Adverse effect of iron or its compound, subsequent encounter   • Hemorrhage of anus and rectum   • Vulvar lesion   • Acute upper GI bleed   • Acute blood loss anemia   • Acute hyperkalemia   • Labial cyst   • Transaminitis   • Hyperbilirubinemia   • Acute gastritis   • UTI (urinary tract infection), bacterial   • UTI (urinary tract infection)   • Cholestatic pruritus   • Fatty liver disease, nonalcoholic   • Protein-calorie malnutrition, moderate (CMS/HCC)   • Elevated procalcitonin   • Hyperglycemia due to type 1 diabetes mellitus (CMS/HCC)     Past Medical History:   Diagnosis Date   • Anemia    • Anxiety    • Arthritis    • Broken shoulder     left shoulder    • Chromosomal abnormality     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   • Cirrhosis (CMS/HCC)    • Clostridium difficile infection    • Colon polyps    • Depression    • Diabetes mellitus (CMS/HCC)     Adult onset, insulin requiring   • Duodenal ulcer with hemorrhage    • Esophageal varices determined by endoscopy (CMS/HCC)    • Fibromyalgia    • Gallbladder disease    • Gastric varices    • Gastroparesis    • Glaucoma    • Granuloma annulare    • H/O B12 deficiency    • H/O Bleeding ulcer     And gastroesophageal varices   • H/O mixed connective tissue disorder    • Hemorrhoids    • Hiatal hernia    • History of transfusion     needs bendryl  flushes sensation and temp goes up   • Hx of being hospitalized     In [place] for GI bleeding from ulcer and varices   • Hyperlipidemia    • Migraine    • Mitral valve prolapse    • DUKES (nonalcoholic steatohepatitis) 11/2016   • Orthostatic hypotension 02/2019   • BRICE (obstructive sleep apnea)    • Pancreas divisum    • Pancytopenia (CMS/HCC)     Chronic, due to cirrhosis and hypersplenism   • Peptic ulcer disease     And esophageal varices   • PONV (postoperative nausea and vomiting)    • RA (rheumatoid  arthritis) (CMS/HCC)    • Seizure disorder (CMS/HCC)     last 2003   • Seizures (CMS/HCC)    • Systemic lupus (CMS/HCC)    • TIA (transient ischemic attack) 1984     Past Surgical History:   Procedure Laterality Date   • BARTHOLIN CYST MARSUPIALIZATION Left 1/18/2021    Procedure: EXCISION OF LABIAL CYST;  Surgeon: Melinda Thakkar MD;  Location: [facility] MAIN OR;  Service: Obstetrics/Gynecology;  Laterality: Left;   • BLADDER REPAIR  1991   • CATARACT EXTRACTION     • CHOLECYSTECTOMY  1994   • COLONOSCOPY     • ENDOSCOPY N/A 11/7/2016    Procedure: ESOPHAGOGASTRODUODENOSCOPY WITH COLD POLYPECTOMY, BANDING,  AND CLIPS TIMES 2;  Surgeon: Rich Coleman MD;  Location: [facility] ENDOSCOPY;  Service:    • ENDOSCOPY N/A 12/22/2016    Procedure: ESOPHAGOGASTRODUODENOSCOPY WITH ESOPHAGEAL BANDING. 5 BANDS FIRED, 4 BANDS ADHERERD TO MUCOSA;  Surgeon: Rich Coleman MD;  Location: [facility] ENDOSCOPY;  Service:    • ENDOSCOPY N/A 2/15/2017    Procedure: ESOPHAGOGASTRODUODENOSCOPY AT BEDSIDE with esophageal varices banding (3);  Surgeon: Rich Coleman MD;  Location: [facility] ENDOSCOPY;  Service:    • ENDOSCOPY N/A 4/6/2017    Procedure: ESOPHAGOGASTRODUODENOSCOPY WITH ESOPHAGEAL VARICES BANDING x2;  Surgeon: Rich Coleman MD;  Location: [facility] ENDOSCOPY;  Service:    • ENDOSCOPY N/A 11/24/2017    Procedure: ESOPHAGOGASTRODUODENOSCOPY with biopsies;  Surgeon: Rich Coleman MD;  Location: [facility] ENDOSCOPY;  Service:    • ENDOSCOPY N/A 10/5/2018    Procedure: ESOPHAGOGASTRODUODENOSCOPY;  Surgeon: Rich Coleman MD;  Location: [facility] ENDOSCOPY;  Service: Gastroenterology   • ENDOSCOPY N/A 5/10/2019    Procedure: ESOPHAGOGASTRODUODENOSCOPY AT BEDSIDE WITH VARICEAL LIGATION;  Surgeon: Rich Coleman MD;  Location: [facility] ENDOSCOPY;  Service: Gastroenterology   • ENDOSCOPY N/A 6/28/2019    Procedure: ESOPHAGOGASTRODUODENOSCOPY WITH ESOPHAGEAL BANDING (3 BANDS);  Surgeon: Rich Coleman MD;  Location: [facility] ENDOSCOPY;  " Service: Gastroenterology   • ENDOSCOPY N/A 4/3/2021    Procedure: ESOPHAGOGASTRODUODENOSCOPY WITH COLD BIOPSIES & BRUSHINGS;  Surgeon: Endy Vasquez MD;  Location: [facility] ENDOSCOPY;  Service: Gastroenterology;  Laterality: N/A;  PRE- N,V & DYSPHAGIA  POST- GASTRITIS, POSSIBLE FUNGAL ESOPHAGITIS   • ENDOSCOPY AND COLONOSCOPY  2014    Dr. Rich Coleman with findings of candida esophagitis   • EYE SURGERY     • HYSTERECTOMY  1986    partial   • JOINT REPLACEMENT     • KNEE SURGERY Right 1995    \"right knee recontstruction\"   • LIVER BIOPSY  01/2015   • MASS EXCISION     • STOMACH SURGERY     • TIPS PROCEDURE  2020    x2     General Information     Row Name 04/05/21 0931          Physical Therapy Time and Intention    Document Type  therapy note (daily note)  -     Mode of Treatment  physical therapy  -     Row Name 04/05/21 0931          General Information    Existing Precautions/Restrictions  fall  -     Row Name 04/05/21 0931          Cognition    Orientation Status (Cognition)  oriented x 4  -SM       User Key  (r) = Recorded By, (t) = Taken By, (c) = Cosigned By    Initials Name Provider Type     Sheila Mccormick PTA Physical Therapy Assistant        Mobility     Row Name 04/05/21 0931          Bed Mobility    Bed Mobility  supine-sit;sit-supine  -     Supine-Sit [place] (Bed Mobility)  supervision  -     Sit-Supine [place] (Bed Mobility)  supervision  -     Assistive Device (Bed Mobility)  bed rails;head of bed elevated  -     Row Name 04/05/21 0931          Sit-Stand Transfer    Sit-Stand [place] (Transfers)  supervision  -     Assistive Device (Sit-Stand Transfers)  walker, front-wheeled  -     Row Name 04/05/21 0931          Gait/Stairs (Locomotion)    [place] Level (Gait)  contact guard  -     Assistive Device (Gait)  walker, front-wheeled  -     Distance in Feet (Gait)  120  -SM     Deviations/Abnormal Patterns (Gait)  chica decreased;stride length " decreased  -SM     Bilateral Gait Deviations  forward flexed posture  -SM       User Key  (r) = Recorded By, (t) = Taken By, (c) = Cosigned By    Initials Name Provider Type    Sheila Pina PTA Physical Therapy Assistant        Obj/Interventions     Row Name 04/05/21 0932          Motor Skills    Therapeutic Exercise  -- seated B AP, LAQ, marches, hip abd/add x10 reps  -SM       User Key  (r) = Recorded By, (t) = Taken By, (c) = Cosigned By    Initials Name Provider Type    Sheila Pina PTA Physical Therapy Assistant        Goals/Plan    No documentation.       Clinical Impression     Row Name 04/05/21 0933          Pain    Additional Documentation  Pain Scale: Numbers Pre/Post-Treatment (Group)  -SM     [facility] Name 04/05/21 0933          Pain Scale: Numbers Pre/Post-Treatment    Pretreatment Pain Rating  3/10  -SM     Posttreatment Pain Rating  3/10  -SM     Pain Location - Side  Right  -SM     Pain Location  abdomen  -SM     Pain Intervention(s)  Repositioned;Ambulation/increased activity;Rest  -SM     Row Name 04/05/21 0933          Positioning and Restraints    Pre-Treatment Position  in bed  -SM     Post Treatment Position  bed  -SM     In Bed  supine;call light within reach;encouraged to call for assist  -SM       User Key  (r) = Recorded By, (t) = Taken By, (c) = Cosigned By    Initials Name Provider Type    Sheila Pina PTA Physical Therapy Assistant        Outcome Measures     Row Name 04/05/21 0933          How much help from another person do you currently need...    Turning from your back to your side while in flat bed without using bedrails?  4  -SM     Moving from lying on back to sitting on the side of a flat bed without bedrails?  3  -SM     Moving to and from a bed to a chair (including a wheelchair)?  4  -SM     Standing up from a chair using your arms (e.g., wheelchair, bedside chair)?  4  -SM     Climbing 3-5 steps with a railing?  3  -SM     To walk in hospital  room?  3  -     AM-PAC 6 Clicks Score (PT)  21  -     Row Name 04/05/21 0933          Functional Assessment    Outcome Measure Options  AM-PAC 6 Clicks Basic Mobility (PT)  -       User Key  (r) = Recorded By, (t) = Taken By, (c) = Cosigned By    Initials Name Provider Type     Sheila Mccormick PTA Physical Therapy Assistant        Physical Therapy Education                 Title: PT OT SLP Therapies (Done)     Topic: Physical Therapy (Done)     Point: Mobility training (Done)     Learning Progress Summary           Patient Acceptance, E,TB,D, VU,NR by  at 4/5/2021 0934    Acceptance, E,TB, VU by PT at 4/4/2021 1618    Acceptance, E,TB,D, VU,NR by  at 4/4/2021 1137    Acceptance, E, VU,NR by  at 4/2/2021 1624                   Point: Body mechanics (Done)     Learning Progress Summary           Patient Acceptance, E,TB,D, VU,NR by  at 4/5/2021 0934    Acceptance, E,TB, VU by PT at 4/4/2021 1618    Acceptance, E,TB,D, VU,NR by  at 4/4/2021 1137    Acceptance, E, VU,NR by  at 4/2/2021 1624                   Point: Precautions (Done)     Learning Progress Summary           Patient Acceptance, E,TB,D, VU,NR by  at 4/5/2021 0934    Acceptance, E,TB, VU by PT at 4/4/2021 1618    Acceptance, E,TB,D, VU,NR by  at 4/4/2021 1137    Acceptance, E, VU,NR by  at 4/2/2021 1624                               User Key     Initials Effective Dates Name Provider Type Discipline     03/07/18 -  Sheila Mccormick PTA Physical Therapy Assistant PT    PT 06/08/20 -  Corazon Moody RN Registered Nurse Nurse     09/17/19 -  Michelle Dixon PT Physical Therapist PT              PT Recommendation and Plan     Plan of Care Reviewed With: patient  Progress: improving  Outcome Summary: Pt tolerated treatment well this date. Increased gait distance to 120ft w/ Rw and CGA for safety. Pt barely putting weight through walker, will plan to ambulate w/out AD next visit. Instructed pt on a few seated LE exercises,  and encouraged to ambulate w/ nsg during the day. Patient was intermittently wearing a face mask during this therapy encounter. Therapist used appropriate personal protective equipment including eye protection, mask, and gloves.  Mask used was standard procedure mask. Appropriate PPE was worn during the entire therapy session. Hand hygiene was completed before and after therapy session. Patient is not in enhanced droplet precautions.     Time Calculation:   PT Charges     Row Name 04/05/21 0936             Time Calculation    Start Time  0858  -      Stop Time  0922  -      Time Calculation (min)  24 min  -      PT Received On  04/05/21  -      PT - Next Appointment  04/06/21  -        User Key  (r) = Recorded By, (t) = Taken By, (c) = Cosigned By    Initials Name Provider Type    Sheila Pina PTA Physical Therapy Assistant        Therapy Charges for Today     Code Description Service Date Service Provider Modifiers Qty    29882346284 HC PT THER PROC EA 15 MIN 4/4/2021 Sheila Mccormick PTA GP 2    71181201518 HC PT THER PROC EA 15 MIN 4/5/2021 Sheila Mccormick PTA GP 2          PT G-Codes  Outcome Measure Options: AM-PAC 6 Clicks Basic Mobility (PT)  AM-PAC 6 Clicks Score (PT): 21    Sheila Mccormick PTA  4/5/2021

## 2021-04-05 NOTE — PROGRESS NOTES
Name: Silvia Zabala ADMIT: 2021   : 1960  PCP: Kulwant Martínez APRN    MRN: [id number] LOS: 1 days   AGE/SEX: 60 y.o. female  ROOM: [facility]     Subjective   Subjective    Still complaining of abdominal discomfort.  She had a bowel movement.  No more hypoglycemia.    Review of Systems   Constitutional: Negative for fever.   Respiratory: Negative for cough and shortness of breath.    Cardiovascular: Negative for chest pain.   Gastrointestinal: Positive for abdominal pain and constipation.      Objective   Objective   Vital Signs  Temp:  [98 °F (36.7 °C)-98.6 °F (37 °C)] 98.6 °F (37 °C)  Heart Rate:  [] 104  Resp:  [16] 16  BP: (137-154)/(64-74) 146/72  SpO2:  [97 %-98 %] 97 %  on   ;   Device (Oxygen Therapy): room air  Body mass index is 28.8 kg/m².    Physical Exam  Vitals and nursing note reviewed.   Constitutional:       General: She is not in acute distress.  HENT:      Head: Normocephalic and atraumatic.   Cardiovascular:      Rate and Rhythm: Normal rate and regular rhythm.   Pulmonary:      Effort: Pulmonary effort is normal. No respiratory distress.      Breath sounds: Normal breath sounds.   Abdominal:      General: Bowel sounds are normal. There is distension.      Palpations: Abdomen is soft.      Tenderness: There is no abdominal tenderness. There is no guarding or rebound.   Musculoskeletal:         General: No swelling.   Skin:     General: Skin is warm and dry.   Neurological:      General: No focal deficit present.      Mental Status: She is alert and oriented to person, place, and time.   Psychiatric:         Mood and Affect: Mood normal.         Behavior: Behavior normal.     Results Review  I reviewed the patient's new clinical results.  Results from last 7 days   Lab Units 21  0525 21  1714   WBC 10*3/mm3 4.08 4.71   HEMOGLOBIN g/dL 11.2* 13.3   PLATELETS 10*3/mm3 95* 107*     Results from last 7 days   Lab Units 21  0501 21  0525 21    SODIUM mmol/L 138 132* 130*   POTASSIUM mmol/L 4.3 4.3 4.1   CHLORIDE mmol/L 109* 101 94*   CO2 mmol/L 21.8* 21.4* 21.6*   BUN mg/dL 12 20 23*   CREATININE mg/dL 0.74 0.82 1.05*   GLUCOSE mg/dL 143* 367* 606*     Estimated Creatinine Clearance: 92 mL/min (by C-G formula based on SCr of 0.74 mg/dL).    Results from last 7 days   Lab Units 04/02/21  0525 04/01/21  1714   ALBUMIN g/dL 3.20* 3.90   BILIRUBIN mg/dL 1.4* 2.2*   ALK PHOS U/L 141* 180*   AST (SGOT) U/L 24 28   ALT (SGPT) U/L 23 29     Results from last 7 days   Lab Units 04/05/21  0501 04/02/21  0525 04/01/21  1714   CALCIUM mg/dL 8.2* 8.5* 9.6   ALBUMIN g/dL  --  3.20* 3.90   MAGNESIUM mg/dL  --   --  1.7     Results from last 7 days   Lab Units 04/02/21  0525 04/01/21  2041 04/01/21  1735 04/01/21  1714   PROCALCITONIN ng/mL  --   --   --  0.34*   LACTATE mmol/L 1.3 2.7* 3.3*  --      Glucose   Date/Time Value Ref Range Status   04/05/2021 0600 121 70 - 130 mg/dL Final   04/04/2021 2000 120 70 - 130 mg/dL Final   04/04/2021 1649 84 70 - 130 mg/dL Final   04/04/2021 1158 230 (H) 70 - 130 mg/dL Final   04/04/2021 0624 224 (H) 70 - 130 mg/dL Final   04/04/2021 0136 65 (L) 70 - 130 mg/dL Final   04/03/2021 2104 204 (H) 70 - 130 mg/dL Final     Scheduled Meds  cholestyramine light, 8 g, Oral, BID  docusate sodium, 100 mg, Oral, Daily  ferrous sulfate, 325 mg, Oral, Daily With Breakfast  fluconazole, 100 mg, Oral, Q24H  FLUoxetine, 10 mg, Oral, Daily  furosemide, 80 mg, Oral, Daily  hydrOXYzine, 50 mg, Oral, Nightly  insulin glargine, 50 Units, Subcutaneous, BID  insulin lispro, 0-9 Units, Subcutaneous, TID AC  insulin lispro, 40 Units, Subcutaneous, TID AC  levETIRAcetam, 500 mg, Oral, BID  magnesium oxide, 400 mg, Oral, Daily  metoclopramide, 10 mg, Oral, 4x Daily AC & at Bedtime  mirtazapine, 15 mg, Oral, Nightly  multivitamin with minerals, 1 tablet, Oral, Daily  pantoprazole, 40 mg, Oral, QAM  pregabalin, 75 mg, Oral, Q12H  sodium chloride, 10 mL,  Intravenous, Q12H  spironolactone, 100 mg, Oral, Daily  sucralfate, 1 g, Oral, 4x Daily AC & at Bedtime  vitamin B-12, 1,000 mcg, Oral, Daily  vitamin D, 50,000 Units, Oral, Q7 Days    Continuous Infusions  sodium chloride, 30 mL/hr, Last Rate: Stopped (04/03/21 1058)    PRN Meds  •  ALPRAZolam  •  dextrose  •  dextrose  •  glucagon (human recombinant)  •  nitroglycerin  •  oxyCODONE  •  [COMPLETED] Insert peripheral IV **AND** sodium chloride  •  sodium chloride  •  sodium chloride    sodium chloride, 30 mL/hr, Last Rate: Stopped (04/03/21 1058)    Diet  Diet Regular; Consistent Carbohydrate     Intake/Output Summary (Last 24 hours) at 4/5/2021 0949  Last data filed at 4/5/2021 0600  Gross per 24 hour   Intake 2115 ml   Output 3350 ml   Net -1235 ml    Weight change: 1.769 kg (3 lb 14.4 oz)      Assessment/Plan     Active Hospital Problems    Diagnosis  POA   • **Uncontrolled diabetes mellitus with hyperglycemia (CMS/HCC) [E11.65]  Yes   • Hyperglycemia due to type 1 diabetes mellitus (CMS/HCC) [E10.65]  Yes   • Elevated procalcitonin [R79.89]  Unknown   • Dehydration [E86.0]  Yes   • BRICE (obstructive sleep apnea) [G47.33]  Yes   • Intractable vomiting with nausea [R11.2]  Yes   • 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     60 y.o. female admitted with weakness and uncontrolled diabetes.    · Diabetes with hyperglycemia, uncontrolled  · Control is fair.  No more hypoglycemia  · Continue long-acting, mealtime insulin, correctional and adjust as needed  · At home she had a continuous glucose monitor but switched to fingerstick due to some issue with supply at home  · She has ollow-up with Dr. Burnett April 22  · Dehydration due to hyperglycemia, GI losses  · Resolved  · Lactate elevation resolved  · Nausea and vomiting, history of gastroparesis  · She is on scheduled Reglan  · Status post  EGD 4/3/21 ? candidiasis esophagitis: Diflucan   · Await biopsies  · DUKES, cirrhosis  · Large amount of ascites noted on KUB  · Back on diuretics- at home she was on them as needed- change to daily for now. Diuresed net about a liter.  Patient still uncomfortable will order ultrasound-guided paracentesis, albumin if more than 5 L removed.  She has had paracentesis before and wishes to proceed.  · Rifaximin  · Chronic thrombocytopenia from liver disease  · Elevated procalcitonin  · Appreciate ID.  No infectious work-up  · SCDs for DVT prophylaxis  · Full code  · Discussed with patient and nursing staff  · Anticipate discharge home in 1-2 days.    Dontae Brower MD  [facility]  04/05/21  09:49 EDT    I wore protective equipment throughout this patient encounter including a face mask, gloves, and protective eyewear.  Hand hygiene was performed before donning protective equipment and after removal when leaving the room.    addendum:  no enough fluid for paracentesis  see how she does with bowel medications    Dontae Brower MD  04/05/21  13:56 EDT

## 2021-04-05 NOTE — PLAN OF CARE
Goal Outcome Evaluation:     Progress: no change  Outcome Summary: Pt AOx4. On RA. Up to BSC ad alicja. Paracentesis was not completed due to not enough fluid to be drained. Pt c/o constipation. Mirilax ordered, amatiza for tonight and a fleet enema if needed. I discussed with pt and GI APRn about maybe holding one dose of the questran (instead of BID) or only giving 1 packet (4mg) vs 2 if patient can handle. c/o abd pain and prn pain meds given per MAR. No acute distress will continue to monitor.

## 2021-04-05 NOTE — PLAN OF CARE
Goal Outcome Evaluation:  Plan of Care Reviewed With: patient  Progress: improving  Outcome Summary: Pt tolerated treatment well this date. Increased gait distance to 120ft w/ Rw and CGA for safety. Pt barely putting weight through walker, will plan to ambulate w/out AD next visit. Instructed pt on a few seated LE exercises, and encouraged to ambulate w/ nsg during the day. Patient was intermittently wearing a face mask during this therapy encounter. Therapist used appropriate personal protective equipment including eye protection, mask, and gloves.  Mask used was standard procedure mask. Appropriate PPE was worn during the entire therapy session. Hand hygiene was completed before and after therapy session. Patient is not in enhanced droplet precautions.

## 2021-04-05 NOTE — PROGRESS NOTES
Continued Stay Note  [facility]     Patient Name: Silvia Zabala  MRN: [id number]  Today's Date: 4/5/2021    Admit Date: 4/1/2021    Discharge Plan     Row Name 04/05/21 1300       Plan    Plan  Plan home with family.  STEVAN Rayo RN    Patient/Family in Agreement with Plan  yes    Plan Comments  Spoke to pt at bedside.  Pt denies any discharge needs.  Pt' spouse will transport pt home.  Plan home.  STEVAN Rayo RN        Discharge Codes    No documentation.             Liset Rayo, RN

## 2021-04-05 NOTE — PAYOR COMM NOTE
"Luis A Lutherna S (60 y.o. Female)     ATTN: INITIAL CLINICALS TO REVIEW FOR INPATIENT ADMISSION:[id number]    PATIENT CHANGED FROM OBSERVATION TO INPATIENT 04/03/21    PLEASE REPLY TO UR DEPT: ELMO ZELAYA RN,[facility]; [phone],  [phone]       Date of Birth Social Security Number Address Home Phone MRN    1960  [address]  [address] [place], [place] [address] [phone] [id number]    Yazidi Marital Status          None        Admission Date Admission Type Admitting Provider Attending Provider Department, Room/Bed    4/1/21 Emergency Scottie Nogueira MD Nguyen, Minh Loc, MD [address], E655/1    Discharge Date Discharge Disposition Discharge Destination                       Attending Provider: Dontae Brower MD    Allergies: Albuterol, Imitrex [Sumatriptan], Tramadol, Nsaids, Tylenol [Acetaminophen], Zofran [Ondansetron Hcl], Lactulose, Quinine Derivatives    Isolation: None   Infection: None   Code Status: CPR    Ht: 170.2 cm (67\")   Wt: 83.4 kg (183 lb 14.4 oz)    Admission Cmt: None   Principal Problem: Uncontrolled diabetes mellitus with hyperglycemia (CMS/[facility]) [E11.65]                 Active Insurance as of 4/1/2021     Primary Coverage     Payor Plan Insurance Group Employer/Plan Group    ANTH BLUE CROSS ANTHEM BLUE CROSS BLUE SHIELD PPO [id number]     Payor Plan Address Payor Plan Phone Number Payor Plan Fax Number Effective Dates    [address] [phone]  1/1/2017 - None Entered    [facility] [id number]       Subscriber Name Subscriber Birth Date Member ID       RADHA LUTHER 7/12/1968 [id number]           Secondary Coverage     Payor Plan Insurance Group Employer/Plan Group    MEDICARE MEDICARE A & B      Payor Plan Address Payor Plan Phone Number Payor Plan Fax Number Effective Dates    [address] [phone]  9/1/2003 - None Entered    [facility] [id number]       Subscriber Name Subscriber Birth Date Member ID       SILVIA LUTHER " S 1960 [id number]                 Emergency Contacts      (Rel.) Home Phone Work Phone Mobile Phone    Jose Zabala (Spouse) [phone] -- [phone]    Emma Haywood (Daughter) [phone] -- --    Beka Haywood (Brother) -- -- [phone]               History & Physical      Scottie Nogueira MD at 21          Internal medicine history and physical  INTERNAL MEDICINE   [facility]       Patient Identification:  Name: Silvia Zabala  Age: 60 y.o.  Sex: female  :  1960  MRN: [id number]                   Primary Care Physician: Kulwant Martínez APRN                               Date of admission:2021    Chief Complaint: Progressive weakness fatigue not eating well and associated nausea and vomiting for the last 3 weeks.    History of Present Illness:   Patient is a 60-year-old female who has history of cirrhosis of the liver due to Wong, gastroparesis, diabetes mellitus which is poorly controlled as well as history of portal hypertension with TIPS procedure as well as esophageal variceal banding was in her usual state of her health until 3 weeks ago when she started having increasing nausea vomiting unable to eat or drink much and was getting progressively weak and fatigued.  She resisted the idea of her  to go to the emergency room or seek help until today when she finally gave into the suggestion and came to the emergency room with long list of complaints as summarized above.  In the emergency room she was found to have blood sugar of greater than 600, elevated lactic acid level.  Patient was given IV fluid bolus empiric antibiotic therapy after blood cultures were drawn and given insulin and is being admitted for further care.  Patient is currently feeling better.  Patient also complaining of some constipation in the last few days.      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   • Cirrhosis (CMS/HCC)    • Clostridium difficile infection    • Colon polyps    • Depression    • Diabetes mellitus (CMS/HCC)     Adult onset, insulin requiring   • Duodenal ulcer with hemorrhage    • Esophageal varices determined by endoscopy (CMS/HCC)    • Fibromyalgia    • Gallbladder disease    • Gastric varices    • Gastroparesis    • Glaucoma    • Granuloma annulare    • H/O B12 deficiency    • H/O Bleeding ulcer     And gastroesophageal varices   • H/O mixed connective tissue disorder    • Hemorrhoids    • Hiatal hernia    • History of transfusion     needs bendryl  flushes sensation and temp goes up   • Hx of being hospitalized     In [place] for GI bleeding from ulcer and varices   • Hyperlipidemia    • Migraine    • Mitral valve prolapse    • DUKES (nonalcoholic steatohepatitis) 11/2016   • Orthostatic hypotension 02/2019   • BRICE (obstructive sleep apnea)    • Pancreas divisum    • Pancytopenia (CMS/HCC)     Chronic, due to cirrhosis and hypersplenism   • Peptic ulcer disease     And esophageal varices   • PONV (postoperative nausea and vomiting)    • RA (rheumatoid arthritis) (CMS/HCC)    • Seizure disorder (CMS/HCC)     last 2003   • Seizures (CMS/HCC)    • Systemic lupus (CMS/HCC)    • TIA (transient ischemic attack) 1984     Past Surgical History:  Past Surgical History:   Procedure Laterality Date   • BARTHOLIN CYST MARSUPIALIZATION Left 1/18/2021    Procedure: EXCISION OF LABIAL CYST;  Surgeon: Melinda Thakkar MD;  Location: [facility];  Service: Obstetrics/Gynecology;  Laterality: Left;   • BLADDER REPAIR  1991   • CATARACT EXTRACTION     • CHOLECYSTECTOMY  1994   • COLONOSCOPY     • ENDOSCOPY N/A 11/7/2016    Procedure: ESOPHAGOGASTRODUODENOSCOPY WITH COLD POLYPECTOMY, BANDING,  AND CLIPS TIMES 2;  Surgeon:  "Rich Coleman MD;  Location: [facility] ENDOSCOPY;  Service:    • ENDOSCOPY N/A 12/22/2016    Procedure: ESOPHAGOGASTRODUODENOSCOPY WITH ESOPHAGEAL BANDING. 5 BANDS FIRED, 4 BANDS ADHERERD TO MUCOSA;  Surgeon: Rich Coleman MD;  Location: [facility] ENDOSCOPY;  Service:    • ENDOSCOPY N/A 2/15/2017    Procedure: ESOPHAGOGASTRODUODENOSCOPY AT BEDSIDE with esophageal varices banding (3);  Surgeon: Rich Coleman MD;  [facility];  Service:    • ENDOSCOPY N/A 4/6/2017    Procedure: ESOPHAGOGASTRODUODENOSCOPY WITH ESOPHAGEAL VARICES BANDING x2;  Surgeon: Rich Coleman MD;  Location: [facility] ENDOSCOPY;  Service:    • ENDOSCOPY N/A 11/24/2017    Procedure: ESOPHAGOGASTRODUODENOSCOPY with biopsies;  Surgeon: Rich Coleman MD;  Location: [facility] ENDOSCOPY;  Service:    • ENDOSCOPY N/A 10/5/2018    Procedure: ESOPHAGOGASTRODUODENOSCOPY;  Surgeon: Rich Coleman MD;  Location: [facility] ENDOSCOPY;  Service: Gastroenterology   • ENDOSCOPY N/A 5/10/2019    Procedure: ESOPHAGOGASTRODUODENOSCOPY AT BEDSIDE WITH VARICEAL LIGATION;  Surgeon: Rich Coleman MD;  Location: [facility] ENDOSCOPY;  Service: Gastroenterology   • ENDOSCOPY N/A 6/28/2019    Procedure: ESOPHAGOGASTRODUODENOSCOPY WITH ESOPHAGEAL BANDING (3 BANDS);  Surgeon: Rich Coleman MD;  Location: [facility] ENDOSCOPY;  Service: Gastroenterology   • ENDOSCOPY AND COLONOSCOPY  2014    Dr. Rich Coleman with findings of candida esophagitis   • EYE SURGERY     • HYSTERECTOMY  1986    partial   • JOINT REPLACEMENT     • KNEE SURGERY Right 1995    \"right knee recontstruction\"   • LIVER BIOPSY  01/2015   • MASS EXCISION     • STOMACH SURGERY     • TIPS PROCEDURE  2020    x2      Home Meds:  Medications Prior to Admission   Medication Sig Dispense Refill Last Dose   • ALPRAZolam (XANAX) 0.5 MG tablet Take 1 tablet by mouth 2 (Two) Times a Day As Needed for Anxiety or Sleep. 60 tablet 0    • Cholecalciferol (Vitamin D3) 25 MCG (1000 UT) capsule Take 1,000 " Units by mouth Daily.      • cholestyramine light 4 g powder Take 1 packet by mouth 2 (Two) Times a Day. (Patient taking differently: Take 8 g by mouth 2 (Two) Times a Day.) 60 packet 5    • Continuous Blood Gluc Sensor (FreeStyle Roseline Sensor System) 1 Device Every 14 (Fourteen) Days. 2 each 3    • Docusate Sodium (COLACE PO) Take 2 capsules by mouth every night at bedtime.      • ergocalciferol (ERGOCALCIFEROL) 1.25 MG (64651 UT) capsule Vitamin D2 1,250 mcg (50,000 unit) capsule      • esomeprazole (nexIUM) 40 MG capsule Take 1 capsule by mouth Every Morning Before Breakfast. 90 capsule 1    • ferrous sulfate 325 (65 FE) MG tablet TAKE ONE TABLET BY MOUTH TWICE A DAY (Patient taking differently: Take 325 mg by mouth 2 (two) times a day.) 60 tablet 2    • furosemide (LASIX) 80 MG tablet Take 80 mg by mouth Daily As Needed (swelling).      • glucose blood test strip by Other route As Needed.      • hydrOXYzine (ATARAX) 25 MG tablet Take 2 tablets by mouth Every Night. 60 tablet 3    • Insulin Glargine (LANTUS SOLOSTAR) 100 UNIT/ML injection pen Inject 80 Units under the skin into the appropriate area as directed 2 (Two) Times a Day. 24 pen 1    • Insulin Lispro, 1 Unit Dial, (HumaLOG KwikPen) 100 UNIT/ML solution pen-injector INJECT UP TO 60 UNITS THREE TIMES A DAY WITH  MEALS 30 mL 0    • levETIRAcetam (KEPPRA) 500 MG tablet TAKE ONE TABLET BY MOUTH TWICE A  tablet 3    • Magnesium Oxide 400 MG capsule Take 400 mg by mouth Every Night. 90 each 1    • metoclopramide (REGLAN) 10 MG tablet Take 1 tablet by mouth 4 (Four) Times a Day Before Meals & at Bedtime. 120 tablet 1    • mirtazapine (REMERON) 15 MG tablet Take 15 mg by mouth Every Night.      • Multiple Vitamins-Minerals (MULTIVITAMIN WITH MINERALS) tablet tablet Take 1 tablet by mouth Daily.      • OxyCODONE HCl (OXAYDO) 7.5 MG tablet  Take 7.5 mg by mouth Every 8 (Eight) Hours As Needed.      • polyethylene glycol (MIRALAX) powder Take 17 g by mouth  "Daily As Needed.      • Prasterone (Intrarosa) 6.5 MG insert Insert 1 each into the vagina 2 (Two) Times a Week.      • pregabalin (LYRICA) 75 MG capsule Take 75 mg by mouth 2 (Two) Times a Day.      • riFAXIMin (Xifaxan) 550 MG tablet Take 1 tablet by mouth 2 (Two) Times a Day. 180 tablet 1    • spironolactone (ALDACTONE) 100 MG tablet TAKE ONE TABLET BY MOUTH DAILY (Patient taking differently: Take 100 mg by mouth Daily As Needed (swelling).) 30 tablet 2    • sucralfate (CARAFATE) 1 GM/10ML suspension Take 1 g by mouth 3 (Three) Times a Day.      • vitamin B-12 (CYANOCOBALAMIN) 1000 MCG tablet Take 1,000 mcg by mouth Daily.      • lubiprostone (Amitiza) 8 MCG capsule Take 1 capsule by mouth 2 (Two) Times a Day With Meals. 180 capsule 1      Current Meds:     Current Facility-Administered Medications:   •  [COMPLETED] Insert peripheral IV, , , Once **AND** sodium chloride 0.9 % flush 10 mL, 10 mL, Intravenous, PRN, Anisha Malagon, JOEL  Allergies:  Allergies   Allergen Reactions   • Albuterol Anaphylaxis   • Imitrex [Sumatriptan] Anaphylaxis   • Tramadol Nausea Only, Other (See Comments) and GI Intolerance     Per pt causes her \"palsy, meaning shaking and tremors\"    • Nsaids Other (See Comments)     Told by MD not to take due to liver problems   • Tylenol [Acetaminophen] Other (See Comments)     Has liver problems and told by MD to not take   • Zofran [Ondansetron Hcl] Other (See Comments)     Pt states does not work to correct nausea and vomiting.    • Lactulose Nausea And Vomiting and Other (See Comments)     Severe abdominal pain   • Quinine Derivatives Nausea And Vomiting     Social History:   Social History     Tobacco Use   • Smoking status: Former Smoker     Packs/day: 1.00     Years: 4.00     Pack years: 4.00     Types: Cigarettes     Quit date: 2015     Years since quittin.2   • Smokeless tobacco: Never Used   Substance Use Topics   • Alcohol use: No      Family History:  Family History   " "  Problem Relation Age of Onset   • Liver disease Other    • Depression Other    • Heart disease Other    • Hypertension Other    • Diabetes Other    • Breast cancer Other    • Brain cancer Other    • Uterine cancer Other    • Colon cancer Other    • Leukemia Other    • Colon cancer Maternal Aunt    • Hypertension Mother    • Diabetes type II Mother    • Rheum arthritis Mother    • Liver disease Mother         DUKES   • Heart disease Father    • Diabetes type II Father    • Diabetes type II Sister    • Lupus Sister    • Malig Hyperthermia Neg Hx           Review of Systems  See history of present illness and past medical history.    Constitutional: Remarkable for no fever or chills, does have generalized weakness and fatigue.  Cardiovascular: Remarkable for no chest pain orthopnea or PND  Respiratory: Remarkable for no cough or congestion  GI: Remarkable for nausea constipation vomiting decreased appetite  : Remarkable for no burning in urination frequency or urgency  Musculoskeletal: Remarkable for generalized body aches  Neurological: Remarkable for no loss of consciousness or continence.    Remainder of ROS is negative.      Vitals:   /68 (BP Location: Left arm, Patient Position: Lying)   Pulse 87   Temp 98.1 °F (36.7 °C) (Oral)   Resp 18   Ht 170.2 cm (67\")   Wt 80.8 kg (178 lb 1.6 oz)   LMP  (LMP Unknown)   SpO2 94%   BMI 27.89 kg/m²   I/O:     Intake/Output Summary (Last 24 hours) at 4/1/2021 2044  Last data filed at 4/1/2021 1903  Gross per 24 hour   Intake 50 ml   Output 250 ml   Net -200 ml     Exam:  Patient is examined using the personal protective equipment as per guidelines from infection control for this particular patient as enacted.  Hand washing was performed before and after patient interaction.  General Appearance:    Alert, cooperative, no distress, appears stated age   Head:    Normocephalic, without obvious abnormality, atraumatic   Eyes:    PERRL, conjunctiva/corneas clear, " EOM's intact, both eyes   Ears:    Normal external ear canals, both ears   Nose:   Nares normal, septum midline, mucosa normal, no drainage    or sinus tenderness   Throat:   Lips, tongue, gums normal; oral mucosa pink and moist   Neck:   Supple, symmetrical, trachea midline, no adenopathy;     thyroid:  no enlargement/tenderness/nodules; no carotid    bruit or JVD   Back:     Symmetric, no curvature, ROM normal, no CVA tenderness   Lungs:     Clear to auscultation bilaterally, respirations unlabored   Chest Wall:    No tenderness or deformity    Heart:    Regular rate and rhythm, S1 and S2 normal, no murmur, rub   or gallop   Abdomen:    Obese soft nontender no significant ascites noted no guarding rigidity or rebound noted   Extremities:  Trace edema   Pulses:   Pulses palpable in all extremities; symmetric all extremities   Skin:   Skin color normal, Skin is warm and dry,  no rashes or palpable lesions   Neurologic:  Alert oriented in no acute distress grossly nonfocal examination.       Data Review:      I reviewed the patient's new clinical results.  Results from last 7 days   Lab Units 04/01/21  1714   WBC 10*3/mm3 4.71   HEMOGLOBIN g/dL 13.3   PLATELETS 10*3/mm3 107*     Results from last 7 days   Lab Units 04/01/21  1714   SODIUM mmol/L 130*   POTASSIUM mmol/L 4.1   CHLORIDE mmol/L 94*   CO2 mmol/L 21.6*   BUN mg/dL 23*   CREATININE mg/dL 1.05*   CALCIUM mg/dL 9.6   GLUCOSE mg/dL 606*     Microbiology Results (last 10 days)     Procedure Component Value - Date/Time    COVID PRE-OP / PRE-PROCEDURE SCREENING ORDER (NO ISOLATION) - Swab, Nasopharynx [[id number]]  (Normal) Collected: 04/01/21 1730    Lab Status: Final result Specimen: Swab from Nasopharynx Updated: 04/01/21 1904    Narrative:      The following orders were created for panel order COVID PRE-OP / PRE-PROCEDURE SCREENING ORDER (NO ISOLATION) - Swab, Nasopharynx.  Procedure                               Abnormality         Status                      ---------                               -----------         ------                     COVID-19,[facility] IN-HOUSE...[[id number]]  Normal              Final result                 Please view results for these tests on the individual orders.    COVID-19,[facility] IN-HOUSE CEPHEID, NP SWAB IN TRANSPORT MEDIA 8-12 HR TAT - Swab, Nasopharynx [[id number]]  (Normal) Collected: 04/01/21 1730    Lab Status: Final result Specimen: Swab from Nasopharynx Updated: 04/01/21 1904     COVID19 Not Detected    Narrative:      Fact sheet for providers: https://www.fda.gov/media/971700/download     Fact sheet for patients: https://www.fda.gov/media/290276/download          Assessment:  Active Hospital Problems    Diagnosis  POA   • Hyperglycemia due to diabetes mellitus (CMS/HCC) [E10.65]  Yes   • Dehydration [E86.0]  Yes   • BRICE (obstructive sleep apnea) [G47.33]  Yes   • Uncontrolled diabetes mellitus with hyperglycemia (CMS/HCC) [E11.65]  Yes   • Intractable vomiting with nausea [R11.2]  Yes   • Seizure (CMS/HCC) [R56.9]  Yes   • Degeneration of lumbosacral intervertebral disc [M51.37]  Yes   • Gastroparesis [K31.84]  Yes   • Systemic lupus (CMS/HCC) [M32.9]  Yes       Medical decision making:  Hyperglycemia due to poorly controlled diabetes and the patient was requiring significant amount of insulin suggesting underlying insulin resistance-plan is to admit the patient continue her long-acting and premeal insulin with aggressive IV hydration while holding her Aldactone and Lasix for the next 24 hours.  Reassess her volume status and based on her blood sugar and her response to treatment consider restarting Aldactone and Lasix.  Cirrhosis of the liver due to Wong with history of portal hypertension-continue her current regimen but hold Aldactone until her blood sugar and hydration status is corrected.  Continue Xifaxan.  Seizure disorder-continue her antiseizure medications and monitor and provide her with seizure precautions.  Nausea  vomiting and constipation multifactorial including underlying gastroparesis plan is to continue her metoclopramide and monitor.  Morbid obesity with obstructive sleep apnea-continue with her home CPAP device and provide her with continuous pulse ox monitoring    Scottie Nogueira MD   4/1/2021  20:44 EDT  Much of this encounter note is an electronic transcription/translation of spoken language to printed text. The electronic translation of spoken language may permit erroneous, or at times, nonsensical words or phrases to be inadvertently transcribed; Although I have reviewed the note for such errors, some may still exist      Electronically signed by Scottie Nogueira MD at 04/01/21 2316          Emergency Department Notes      Lou Castro RN at 04/01/21 1633        Patient to triage with a written list of complaints. However, patient is mostly concerned with weakness and fatigue that has been going on for the past 3 weeks. Patient is reportedly vomiting for the past 3 weeks as well.      Lou Catsro RN  04/01/21 1635      Electronically signed by Lou Castro RN at 04/01/21 1635     Anisha Malagon APRN at 04/01/21 1703     Attestation signed by Vitor Tay MD at 04/01/21 2003    MD ATTESTATION NOTE    The AYESHA and I have discussed this patient's history, physical exam, and treatment plan.  I have reviewed the documentation and personally had a face to face interaction with the patient. I affirm the documentation and agree with the treatment and plan.  The attached note describes my personal findings.        For this patient encounter, I reviewed the NP or PA documentation, treatment plan, and medical decision making. Vitor Tay MD 4/1/2021 20:03 EDT                  EMERGENCY DEPARTMENT ENCOUNTER    Room Number:  37/37  Date seen:  4/1/2021  Time seen: 17:03 EDT  PCP: Kulwant Martínez APRN  Historian: patient, prior records    HPI:  Chief complaint:multiple   A complete HPI/ROS/PMH/PSH/SH/FH are  "unobtainable due to: n/a  Context:Silvia Zabala is a 60 y.o. female who presents to the ED with a handwritten list of multiple complaints including 3 weeks of severe fatigue, nausea and vomiting, decreased appetite, headache, itching, cramping, sore throat, pain all over, fluttering in chest and hot and cold chills.  The symptoms are not made better or worse by anything and she has not seen her primary care provider about this problem.  She has a history of Wong, lupus and diabetes she tells me she was born in a \"toxic waste site\" and has been having problems ever since then.  She also mentions that she spoke to have an ultrasound of the right breast tomorrow due to a mass and that she had a primary care appointment today that she was unable to get to due to nausea and vomiting.      Patient was placed in face mask in first look. Patient was wearing facemask when I entered the room and throughout our encounter. I wore full protective equipment throughout this patient encounter including a face mask, eye shield and gloves. Hand hygiene/washing of hands was performed before donning protective equipment and after removal when leaving the room.    MEDICAL RECORD REVIEW    From recent discharge summary dated 2/7/2021.    60-year-old female, with history of anemia, anxiety, liver cirrhosis, diabetes mellitus, fibromyalgia, esophageal varices, gastroparesis, is seen in the hospital today with main complaints of nausea and vomiting.  Patient upon further evaluation in the emergency room, states that she has been unable to keep anything down.  Patient does have significant hyperglycemia in the emergency room.  She does exhibit hyperbilirubinemia and transaminitis.  Patient will be admitted to hospitalist service for further workup of symptoms.  Upon review of urinalysis, she has findings consistent with UTI.  Patient has had recent tips procedure done, she has been doing fairly okay. Over last 2-3 days she has been feeling " really sick.  She denies fever, chills.       1. Intractable nausea, vomiting, diarrhea is most likely secondary to acute gastroenteritis versus gastroparesis.    Continue with symptomatic management and patient has been improving clinically.  Underwent endoscopy in September of 2020 and GI does not see any reason to perform another endoscopy at this point.  Patient is on Reglan which will also be continued. Esophagogram was done today and there was no significant stenosis was noted.  Patient is able to tolerate p.o. diet therefore will be discharged home and will follow-up with GI as an outpatient basis.  2. History of liver cirrhosis with DUKES and esophageal varices, currently patient's mental status at baseline and she appears to be compensated from will cirrhosis standpoint.  She is not any volume overload.  Continue with Aldactone.  Patient is also on Xifaxan which will be continued.  3. Diabetes mellitus, on Lantus and corrective dose insulin which will be continued.  4. History of seizures, on Keppra and will be continued.  5. Depression, psychiatry did evaluate and citalopram has been initiated.       Please note patient was seen examined today on day of discharge.    Also noted are visits from [facility] and patient has hepatic cirrhosis and has h/o TIPS (transjugular interhepatic portosystemic shunt) procedure.  She at one point was on liver transplant list.      From another note dated 2/20/2020: from Dr. Crispin Aquino, Hepatology [facility]    Patient ID: Silvia Zabala is a 59 y.o. female.    Chief Complaint: No chief complaint on file.    HPI  I saw this 59 y.o. lady who came with her sister, brother in law and grandson. She just moved from [place] to [place].  She was diagnosed with DUKES cirrhosis in Nov 2015 when she presented with HE and a fall leading to serious facial injury.    - multiple bleeding episodes since then leading to multiple hospital admissions (6  admissions in 2019 and 2 admissions in ).  - ascites/edema 2 years ago but this became problematic in Dec 2019.    TIPS done in 2020 at [facility]    She describes a paradoxical improvement in her HE AFTER TIPS.    Her ascites and edema have gone and her diuretic dosease have now been reduced.    MELD-Na score: 10 at 2020 8:45 AM  MELD score: 10 at 2020 8:45 AM  Calculated from:  Serum Creatinine: 0.8 mg/dL (Rounded to 1 mg/dL) at 2020 8:45 AM  Serum Sodium: 137 mmol/L at 2020 8:45 AM  Total Bilirubin: 2.0 mg/dL at 2020 8:45 AM  INR(ratio): 1.1 at 2020 8:45 AM  Age: 59 years    There is no height or weight on file to calculate BMI.     PMH:  Poorly controlled DM  SLE  Epilepsy- petit mal (was this related to HE??)    No heart disease    Partial hystererctomy   Bladder repair-   Lap cholecystectomy-   Right knee reconstruction  TIPS- [facility]    FH:  Mother  of DUKES age 75    SH:  Lives in [place]  smiked for 4 years - 15 years ago  No alcohol        ALLERGIES  Albuterol, Imitrex [sumatriptan], Tramadol, Nsaids, Tylenol [acetaminophen], Zofran [ondansetron hcl], Lactulose, and Quinine derivatives      REVIEW OF SYSTEMS  Review of Systems    All systems reviewed and negative except for those discussed in HPI.     PHYSICAL EXAM    ED Triage Vitals [21 1636]   Temp Heart Rate Resp BP SpO2   97.3 °F (36.3 °C) 105 16 -- 94 %      Temp src Heart Rate Source Patient Position BP Location FiO2 (%)   -- -- -- -- --     Physical Exam    I have reviewed the triage vital signs and nursing notes.      GENERAL: not distressed, appears chronically ill  HENT: nares patent, membranes dry  EYES: no scleral icterus  NECK: no ROM limitations  CV: regular rhythm, tachycardia, no murmurs, no rubs no gallop  RESPIRATORY: normal effort, to auscultate bilaterally.  Effort is not increased there is no tachypnea.  She is able to speak in full sentences  ABDOMEN:  soft, rounded, no focal tenderness.  Bowel sounds are normal.  There is no rebound and no guarding  : deferred  MUSCULOSKELETAL: no deformity  NEURO: alert, moves all extremities, follows commands  SKIN: warm, dry    RADIOLOGY RESULTS  XR Abdomen 2+ VW with Chest 1 VW   Final Result          PROGRESS, DATA ANALYSIS, CONSULTS AND MEDICAL DECISION MAKING  All labs have been independently reviewed by me.  All radiology studies have been reviewed by me and discussed with radiologist dictating the report.  EKG's independently viewed and interpreted by me unless stated otherwise. Discussion below represents my analysis of pertinent findings related to patient's condition, differential diagnosis, treatment plan and final disposition.     ED Course as of Apr 01 1942   Thu Apr 01, 2021 1822 Lactate(!!): 3.3 [EW]   1822 Glucose(!!): 606 [EW]   1822 Sodium(!): 130 [EW]   1822 Total Bilirubin(!): 2.2 [EW]   1822 Pt is extremely hyperglycemic.  Her BG is 606.  Her procalitonin and lactic acid are elevated, I have ordered a dose of Rocephin.     [EW]   1903 I discussed admission with Dr. Nogueira, on call for [facility].  He agrees to admit patient to hospital.     [EW]   1905 Patient updated about admission to the hospital.  Her repeat blood glucose is 445 which is improving.    [EW]      ED Course User Index  [EW] Anisha Malagon, APRN     DDx: Nausea and vomiting, poorly controlled diabetes, chronic health problems    MDM: Patient with glucose level of over 600 upon arrival.  She has poorly controlled diabetes.  She is not in DKA.  She has multiple chronic health problems with nausea and vomiting today and will be admitted to the hospital.  She appears ill but not toxic.  Antibiotics started to cover for elevated lactic and elevated procalcitonin.     Reviewed pt's history and workup with Dr. Tay.  After a bedside evaluation, Dr. Tay agrees with the plan of care.    Based on the patient's lab findings and presenting  "symptoms, the doctor and I feel it is appropriate to admit the patient for further management, evaluation, and treatment.  I have discussed this with the admitting team.  I have also discussed this with the patient/family.  They are in agreement with admission.          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  Briefly patient presents for evaluation of multiple complaints.  Main complaint seems to be nausea and vomiting.  Patient has had weakness.  Had some palpitations.  Has multiple chronic medical issues.  Symptoms have been going on for 3 weeks..  Has had no chest pain or pressure          On exam patient is alert cooperative in no distress.  Patient's heart is mildly tachycardic her lungs are clear bilaterally.  His abdomen soft and nontender.        Plan is lab evaluation          MD ATTESTATION NOTE    The AYESHA and I have discussed this patient's history, physical exam, and treatment plan.  I have reviewed the documentation and personally had a face to face interaction with the patient. I affirm the documentation and agree with the treatment and plan.  The attached note describes my personal findings.      Patient was wearing a face mask when I entered the room and they continued to wear a mask throughout their stay in the ED.  I wore PPE, including  gloves, face mask with shield " or face mask with goggles whenever I was in the room with patient.      Vitor Tay MD  04/01/21 2006      Electronically signed by Vitor Tay MD at 04/01/21 2006     Alesia Mejia, RN at 04/01/21 1936        6E RN unable to take report at this time. Will call back.      Alesia Mejia, RN  04/01/21 1936      Electronically signed by Alesia Mejia, RN at 04/01/21 1936       Vital Signs (last 7 days)     Date/Time   Temp   Temp src   Pulse   Resp   BP   Patient Position   SpO2    04/05/21 1410   97.8 (36.6)   Oral   95   16   132/73   Lying   --    04/05/21 1218   --   --   94   16   137/74   Lying   97    Comment rows:    OBSERV: pt here for paracentesis at 04/05/21 1218    04/05/21 0714   98.6 (37)   Oral   --   16   146/72   Lying   --    04/05/21 0036   98.2 (36.8)   Oral   104   16   154/69   Lying   97    04/04/21 1956   98 (36.7)   Oral   90   16   141/64   Lying   98    04/04/21 1523   98.1 (36.7)   Oral   93   16   137/74   --   97    04/03/21 2309   98 (36.7)   Oral   92   18   140/71   Lying   100    04/03/21 1942   98.3 (36.8)   Oral   99   18   143/70   Lying   99    04/03/21 1346   98 (36.7)   Oral   87   16   126/63   Lying   96    04/03/21 1058   --   --   90   16   146/76   Sitting   96    04/03/21 1048   --   --   88   16   128/71   Lying   98    04/03/21 1038   --   --   89   16   133/72   Lying   98    04/03/21 1028   --   --   90   16   128/76   Lying   97    04/03/21 1018   --   --   87   16   108/66   Lying   99    04/03/21 0943   --   --   85   16   129/69   Lying   94    04/03/21 0729   97.7 (36.5)   Oral   86   18   125/61   Lying   93    04/02/21 2300   98.1 (36.7)   Oral   93   18   141/70   Lying   96    04/02/21 1926   98.5 (36.9)   Oral   88   18   117/62   Lying   97    04/02/21 1523   98 (36.7)   Oral   97   18   138/67   Lying   95    04/02/21 0733   98.2 (36.8)   Oral   81   18   131/62   Lying   91    04/01/21 2246   98.4 (36.9)   Oral   86   18   131/66   Lying   98     04/01/21 2010   98.1 (36.7)   Oral   87   18   124/68   Lying   94    04/01/21 1950   --   --   91   --   127/69   --   93    04/01/21 1920   --   --   92   --   130/69   --   94    04/01/21 1858   --   --   95   --   --   --   93    04/01/21 1850   --   --   96   --   137/69   --   94    04/01/21 1735   --   --   102   --   150/78   --   94    04/01/21 1720   --   --   103   --   154/78   Lying   91    04/01/21 1705   --   --   107   --   148/78   --   --    04/01/21 1704   --   --   --   --   144/84   --   --    04/01/21 1636   97.3 (36.3)   --   105   16   --   --   94              Oxygen Therapy (last 7 days)     Date/Time   SpO2   Device (Oxygen Therapy)   Flow (L/min)   Oxygen Concentration (%)   ETCO2 (mmHg)    04/05/21 1218   97   --   --   --   --    04/05/21 0833   --   room air   --   --   --    04/05/21 0036   97   room air   --   --   --    04/04/21 1957   --   room air   --   --   --    04/04/21 1956   98   room air   --   --   --    04/04/21 1523   97   --   --   --   --    04/04/21 1449   --   room air   --   --   --    04/04/21 0908   --   room air   --   --   --    04/03/21 2309   100   room air   --   --   --    04/03/21 2006   --   room air   --   --   --    04/03/21 1942   99   room air   --   --   --    04/03/21 1346   96   room air   --   --   --    04/03/21 1058   96   room air   --   --   --    04/03/21 1048   98   room air   --   --   --    04/03/21 1038   98   open oxygen mask   6   --   --    04/03/21 1028   97   open oxygen mask   6   --   --    04/03/21 1018   99   open oxygen mask   8   --   --    04/03/21 0943   94   room air   --   --   --    04/03/21 0729   93   room air   --   --   --    04/03/21 0014   --   room air   --   --   --    04/02/21 2300   96   room air   --   --   --    04/02/21 2038   --   room air   --   --   --    04/02/21 1926   97   room air   --   --   --    04/02/21 1523   95   room air   --   --   --    04/02/21 0733   91   room air   --   --   --    04/02/21  0037   --   room air   --   --   --    04/01/21 2246   98   --   --   --   --    04/01/21 2055   --   room air   --   --   --    04/01/21 2010   94   room air   --   --   --    04/01/21 1858   93   --   --   --   --    04/01/21 1720   91   --   --   --   --    04/01/21 1636   94   --   --   --   --            Intake & Output (last 7 days)       03/29 0701 - 03/30 0700 03/30 0701 - 03/31 0700 03/31 0701 - 04/01 0700 04/01 0701 - 04/02 0700 04/02 0701 - 04/03 0700 04/03 0701 - 04/04 0700 04/04 0701 - 04/05 0700 04/05 0701 - 04/06 0700    P.O.      240 1555     I.V. (mL/kg)     1050 (13) 3810.4 (46.7) 800 (9.6)     IV Piggyback    50        Total Intake(mL/kg)    50 (0.6) 1050 (13) 4050.4 (49.6) 2355 (28.2)     Urine (mL/kg/hr)    450 300 (0.2)  3350 (1.7) 1000 (1.6)    Stool       0     Total Output    450 300  3350 1000    Net    -400 +750 +4050.4 -995 -1000                Urine Unmeasured Occurrence     2 x 6 x 2 x     Stool Unmeasured Occurrence      1 x 2 x           Lines, Drains & Airways    Active LDAs     Name:   Placement date:   Placement time:   Site:   Days:    Peripheral IV 04/01/21 1751 Right Forearm   04/01/21    1751    Forearm   3               Facility-Administered Medications as of 4/5/2021   Medication Dose Route Frequency Provider Last Rate Last Admin   • albumin human 25 % IV SOLN 12.5 g  12.5 g Intravenous Once Dontae Brower MD       • ALPRAZolam (XANAX) tablet 0.5 mg  0.5 mg Oral BID PRN Endy Vasquez MD   0.5 mg at 04/04/21 2225   • [COMPLETED] cefTRIAXone (ROCEPHIN) IVPB 1 g  1 g Intravenous Once Anisha Malagon, APRLUIS CARLOS   Stopped at 04/01/21 1903   • cholestyramine light packet 8 g  8 g Oral BID Endy Vasquez MD   8 g at 04/05/21 0826   • dextrose (D50W) 25 g/ 50mL Intravenous Solution 25 g  25 g Intravenous Q15 Min PRN Endy Vasquez MD       • dextrose (GLUTOSE) oral gel 15 g  15 g Oral Q15 Min PRN Endy Vasquez MD       • [COMPLETED] diphenhydrAMINE (BENADRYL) injection 12.5 mg  12.5 mg  Intravenous Once Anisha Malagon APRN   12.5 mg at 04/01/21 1738   • docusate sodium (COLACE) capsule 100 mg  100 mg Oral Daily Endy Vasquez MD   100 mg at 04/05/21 0825   • [COMPLETED] droperidol (INAPSINE) injection 2.5 mg  2.5 mg Intravenous Once Anisha Malagon APRN   2.5 mg at 04/01/21 1739   • ferrous sulfate tablet 325 mg  325 mg Oral Daily With Breakfast Endy Vasquez MD   325 mg at 04/05/21 0826   • fluconazole (DIFLUCAN) tablet 200 mg  200 mg Oral Q24H Katheryn Barrett, APRN       • FLUoxetine (PROzac) capsule 10 mg  10 mg Oral Daily Endy Vasquez MD   10 mg at 04/05/21 0826   • furosemide (LASIX) tablet 80 mg  80 mg Oral Daily Dontae Brower MD   80 mg at 04/05/21 0826   • glucagon (human recombinant) (GLUCAGEN DIAGNOSTIC) injection 1 mg  1 mg Subcutaneous Q15 Min PRN Endy Vasquez MD       • hydrOXYzine (ATARAX) tablet 50 mg  50 mg Oral Nightly Endy Vasquez MD   50 mg at 04/04/21 2030   • insulin glargine (LANTUS, SEMGLEE) injection 50 Units  50 Units Subcutaneous BID Dontae Brower MD   50 Units at 04/05/21 0831   • insulin lispro (ADMELOG) injection 0-9 Units  0-9 Units Subcutaneous TID AC Endy Vasquez MD   4 Units at 04/05/21 1143   • insulin lispro (ADMELOG) injection 40 Units  40 Units Subcutaneous TID AC Dontae Brower MD   40 Units at 04/05/21 1143   • [COMPLETED] insulin regular (humuLIN R,novoLIN R) injection 10 Units  10 Units Intravenous Once Anisha Malagon APRN   10 Units at 04/01/21 1832   • [COMPLETED] lactated ringers bolus 1,000 mL  1,000 mL Intravenous Once Anisha Malagon APRN   Stopped at 04/01/21 1904   • levETIRAcetam (KEPPRA) tablet 500 mg  500 mg Oral BID Endy Vasquez MD   500 mg at 04/05/21 0826   • lidocaine (XYLOCAINE) 1 % injection 10 mL  10 mL Intradermal Once Rich Mendoza MD       • lubiprostone (AMITIZA) capsule 24 mcg  24 mcg Oral BID With Meals Katheryn Barrett APRN       • magnesium oxide (MAG-OX) tablet 400 mg  400 mg Oral Daily Christina  MD Endy   400 mg at 04/05/21 0826   • metoclopramide (REGLAN) tablet 10 mg  10 mg Oral 4x Daily AC & at Bedtime Endy Vasquez MD   10 mg at 04/05/21 1143   • mirtazapine (REMERON) tablet 15 mg  15 mg Oral Nightly Endy Vasquez MD   15 mg at 04/04/21 2030   • multivitamin with minerals 1 tablet  1 tablet Oral Daily Endy Vasquez MD   1 tablet at 04/05/21 0826   • nitroglycerin (NITROSTAT) SL tablet 0.4 mg  0.4 mg Sublingual Q5 Min PRN Endy Vasquez MD       • oxyCODONE (ROXICODONE) immediate release tablet 7.5 mg  7.5 mg Oral Q8H PRN Endy Vasquez MD   7.5 mg at 04/05/21 0637   • pantoprazole (PROTONIX) EC tablet 40 mg  40 mg Oral QAM Endy Vasquez MD   40 mg at 04/05/21 0610   • polyethylene glycol (MIRALAX) packet 17 g  17 g Oral Daily Katheryn Barrett APRN   17 g at 04/05/21 1143   • pregabalin (LYRICA) capsule 75 mg  75 mg Oral Q12H Endy Vasquez MD   75 mg at 04/05/21 0825   • [COMPLETED] riFAXIMin (XIFAXAN) tablet 550 mg  550 mg Oral BID Endy Vasquez MD   550 mg at 04/05/21 0826   • sodium chloride 0.9 % flush 10 mL  10 mL Intravenous PRN Endy Vasquez MD       • sodium chloride 0.9 % flush 10 mL  10 mL Intravenous Q12H Endy Vasquez MD   10 mL at 04/05/21 0836   • sodium chloride 0.9 % flush 10 mL  10 mL Intravenous PRN Endy Vasquez MD       • sodium chloride 0.9 % infusion  30 mL/hr Intravenous Continuous PRN Endy Vasquez MD   Stopped at 04/03/21 1058   • spironolactone (ALDACTONE) tablet 100 mg  100 mg Oral Daily Dontae Brower MD   100 mg at 04/05/21 0826   • sucralfate (CARAFATE) tablet 1 g  1 g Oral 4x Daily AC & at Bedtime Endy Vasquez MD   1 g at 04/05/21 1143   • vitamin B-12 (CYANOCOBALAMIN) tablet 1,000 mcg  1,000 mcg Oral Daily Endy Vasquez MD   1,000 mcg at 04/05/21 0826   • vitamin D (ERGOCALCIFEROL) capsule 50,000 Units  50,000 Units Oral Q7 Days Endy Vasquez MD   50,000 Units at 04/02/21 0835     Lab Results (last 7 days)     Procedure Component Value Units Date/Time    POC Glucose  Once [[id number]]  (Abnormal) Collected: 04/05/21 1125    Specimen: Blood Updated: 04/05/21 1129     Glucose 243 mg/dL     Protime-INR [[id number]]  (Abnormal) Collected: 04/05/21 1034    Specimen: Blood from Arm, Right Updated: 04/05/21 1056     Protime 16.1 Seconds      INR 1.32    Tissue Pathology Exam [[id number]] Collected: 04/03/21 1006    Specimen: Tissue from Stomach; Tissue from Esophagus, Distal Updated: 04/05/21 0735    POC Glucose Once [[id number]]  (Normal) Collected: 04/05/21 0600    Specimen: Blood Updated: 04/05/21 0601     Glucose 121 mg/dL     Basic Metabolic Panel [[id number]]  (Abnormal) Collected: 04/05/21 0501    Specimen: Blood Updated: 04/05/21 0549     Glucose 143 mg/dL      BUN 12 mg/dL      Creatinine 0.74 mg/dL      Sodium 138 mmol/L      Potassium 4.3 mmol/L      Comment: Slight hemolysis detected by analyzer. Results may be affected.        Chloride 109 mmol/L      CO2 21.8 mmol/L      Calcium 8.2 mg/dL      eGFR Non African Amer 81 mL/min/1.73      BUN/Creatinine Ratio 16.2     Anion Gap 7.2 mmol/L     Narrative:      GFR Normal >60  Chronic Kidney Disease <60  Kidney Failure <15      POC Glucose Once [[id number]]  (Normal) Collected: 04/04/21 2000    Specimen: Blood Updated: 04/04/21 2002     Glucose 120 mg/dL     Blood Culture - Blood, Arm, Right [[id number]] Collected: 04/01/21 1735    Specimen: Blood from Arm, Right Updated: 04/04/21 1800     Blood Culture No growth at 3 days    Blood Culture - Blood, Arm, Left [[id number]] Collected: 04/01/21 1714    Specimen: Blood from Arm, Left Updated: 04/04/21 1730     Blood Culture No growth at 3 days    POC Glucose Once [[id number]]  (Normal) Collected: 04/04/21 1649    Specimen: Blood Updated: 04/04/21 1651     Glucose 84 mg/dL     POC Glucose Once [[id number]]  (Abnormal) Collected: 04/04/21 1158    Specimen: Blood Updated: 04/04/21 1159     Glucose 230 mg/dL     POC Glucose Once [[id number]]  (Abnormal) Collected: 04/04/21 0624    Specimen: Blood  Updated: 04/04/21 0626     Glucose 224 mg/dL     POC Glucose Once [[id number]]  (Abnormal) Collected: 04/04/21 0136    Specimen: Blood Updated: 04/04/21 0137     Glucose 65 mg/dL     POC Glucose Once [[id number]]  (Abnormal) Collected: 04/03/21 2104    Specimen: Blood Updated: 04/03/21 2106     Glucose 204 mg/dL     POC Glucose Once [[id number]]  (Abnormal) Collected: 04/03/21 1602    Specimen: Blood Updated: 04/03/21 1603     Glucose 142 mg/dL     KOH Prep - Brushing, Esophagus [[id number]] Collected: 04/03/21 1012    Specimen: Brushing from Esophagus Updated: 04/03/21 1307     KOH Prep No yeast or hyphal elements seen    POC Glucose Once [[id number]]  (Normal) Collected: 04/03/21 1126    Specimen: Blood Updated: 04/03/21 1129     Glucose 88 mg/dL     POC Glucose Once [[id number]]  (Normal) Collected: 04/03/21 0606    Specimen: Blood Updated: 04/03/21 0607     Glucose 87 mg/dL     POC Glucose Once [[id number]]  (Normal) Collected: 04/02/21 2051    Specimen: Blood Updated: 04/02/21 2052     Glucose 97 mg/dL     POC Glucose Once [[id number]]  (Abnormal) Collected: 04/02/21 1618    Specimen: Blood Updated: 04/02/21 1620     Glucose 240 mg/dL     POC Glucose Once [[id number]]  (Abnormal) Collected: 04/02/21 1103    Specimen: Blood Updated: 04/02/21 1104     Glucose 389 mg/dL     POC Glucose Once [[id number]]  (Abnormal) Collected: 04/02/21 1101    Specimen: Blood Updated: 04/02/21 1103     Glucose 392 mg/dL     POC Glucose Once [[id number]]  (Abnormal) Collected: 04/02/21 0700    Specimen: Blood Updated: 04/02/21 0703     Glucose 355 mg/dL     CBC Auto Differential [[id number]]  (Abnormal) Collected: 04/02/21 0525    Specimen: Blood Updated: 04/02/21 0621     WBC 4.08 10*3/mm3      RBC 3.69 10*6/mm3      Hemoglobin 11.2 g/dL      Hematocrit 32.3 %      MCV 87.5 fL      MCH 30.4 pg      MCHC 34.7 g/dL      RDW 14.5 %      RDW-SD 46.7 fl      MPV 9.8 fL      Platelets 95 10*3/mm3      Neutrophil % 66.6 %      Lymphocyte % 20.3 %       Monocyte % 10.0 %      Eosinophil % 1.7 %      Basophil % 0.7 %      Immature Grans % 0.7 %      Neutrophils, Absolute 2.71 10*3/mm3      Lymphocytes, Absolute 0.83 10*3/mm3      Monocytes, Absolute 0.41 10*3/mm3      Eosinophils, Absolute 0.07 10*3/mm3      Basophils, Absolute 0.03 10*3/mm3      Immature Grans, Absolute 0.03 10*3/mm3      nRBC 0.0 /100 WBC     Comprehensive Metabolic Panel [[id number]]  (Abnormal) Collected: 04/02/21 0525    Specimen: Blood Updated: 04/02/21 0612     Glucose 367 mg/dL      BUN 20 mg/dL      Creatinine 0.82 mg/dL      Sodium 132 mmol/L      Potassium 4.3 mmol/L      Chloride 101 mmol/L      CO2 21.4 mmol/L      Calcium 8.5 mg/dL      Total Protein 5.3 g/dL      Albumin 3.20 g/dL      ALT (SGPT) 23 U/L      AST (SGOT) 24 U/L      Alkaline Phosphatase 141 U/L      Total Bilirubin 1.4 mg/dL      eGFR Non African Amer 72 mL/min/1.73      Globulin 2.1 gm/dL      A/G Ratio 1.5 g/dL      BUN/Creatinine Ratio 24.4     Anion Gap 9.6 mmol/L     Narrative:      GFR Normal >60  Chronic Kidney Disease <60  Kidney Failure <15      Lactic Acid, Plasma [[id number]]  (Normal) Collected: 04/02/21 0525    Specimen: Blood Updated: 04/02/21 0555     Lactate 1.3 mmol/L     Timed Lactic Acid, Reflex [[id number]]  (Abnormal) Collected: 04/01/21 2041    Specimen: Blood from Arm, Right Updated: 04/01/21 2111     Lactate 2.7 mmol/L     POC Glucose Once [[id number]]  (Abnormal) Collected: 04/01/21 2041    Specimen: Blood Updated: 04/01/21 2043     Glucose 357 mg/dL     COVID PRE-OP / PRE-PROCEDURE SCREENING ORDER (NO ISOLATION) - Swab, Nasopharynx [[id number]]  (Normal) Collected: 04/01/21 1730    Specimen: Swab from Nasopharynx Updated: 04/01/21 1904    Narrative:      The following orders were created for panel order COVID PRE-OP / PRE-PROCEDURE SCREENING ORDER (NO ISOLATION) - Swab, Nasopharynx.  Procedure                               Abnormality         Status                     ---------                                -----------         ------                     COVID-19,[facility]...[[id number]]  Normal              Final result                 Please view results for these tests on the individual orders.    COVID-19,[facility] CEPHEID, NP SWAB IN TRANSPORT MEDIA 8-12 HR TAT - Swab, Nasopharynx [[id number]]  (Normal) Collected: 04/01/21 1730    Specimen: Swab from Nasopharynx Updated: 04/01/21 1904     COVID19 Not Detected    Narrative:      Fact sheet for providers: https://www.fda.gov/media/614371/download     Fact sheet for patients: https://www.fda.gov/media/360856/download    POC Glucose Once [[id number]]  (Abnormal) Collected: 04/01/21 1903    Specimen: Blood Updated: 04/01/21 1904     Glucose 445 mg/dL     Lactic Acid, Plasma [[id number]]  (Abnormal) Collected: 04/01/21 1735    Specimen: Blood Updated: 04/01/21 1818     Lactate 3.3 mmol/L     [place] Draw [[id number]] Collected: 04/01/21 1714    Specimen: Blood Updated: 04/01/21 1815    Narrative:      The following orders were created for panel order [place] Draw.  Procedure                               Abnormality         Status                     ---------                               -----------         ------                     Light Blue Top[[id number]]                                   Final result               Green Top (Gel)[[id number]]                                  Final result               Lavender Top[[id number]]                                     Final result               Gold Top - SST[[id number]]                                   Final result                 Please view results for these tests on the individual orders.    Green Top (Gel) [[id number]] Collected: 04/01/21 1714    Specimen: Blood Updated: 04/01/21 1815     Extra Tube Hold for add-ons.     Comment: Auto resulted.       Lavender Top [[id number]] Collected: 04/01/21 1714    Specimen: Blood Updated: 04/01/21 1815     Extra Tube hold for add-on     Comment: Auto resulted        Light Blue Top [[id number]] Collected: 04/01/21 1714    Specimen: Blood Updated: 04/01/21 1815     Extra Tube hold for add-on     Comment: Auto resulted       Gold Top - SST [[id number]] Collected: 04/01/21 1714    Specimen: Blood Updated: 04/01/21 1815     Extra Tube Hold for add-ons.     Comment: Auto resulted.       Urine Drug Screen - Urine, Catheter [[id number]]  (Abnormal) Collected: 04/01/21 1745    Specimen: Urine, Catheter Updated: 04/01/21 1815     Amphet/Methamphet, Screen Negative     Barbiturates Screen, Urine Negative     Benzodiazepine Screen, Urine Negative     Cocaine Screen, Urine Negative     Opiate Screen Negative     THC, Screen, Urine Negative     Methadone Screen, Urine Negative     Oxycodone Screen, Urine Positive    Narrative:      Negative Thresholds Per Drugs Screened:    Amphetamines                 500 ng/ml  Barbiturates                 200 ng/ml  Benzodiazepines              100 ng/ml  Cocaine                      300 ng/ml  Methadone                    300 ng/ml  Opiates                      300 ng/ml  Oxycodone                    100 ng/ml  THC                           50 ng/ml    The Normal Value for all drugs tested is negative. This report includes final unconfirmed screening results to be used for medical treatment purposes only. Unconfirmed results must not be used for non-medical purposes such as employment or legal testing. Clinical consideration should be applied to any drug of abuse test, particularly when unconfirmed results are used.            Comprehensive Metabolic Panel [[id number]]  (Abnormal) Collected: 04/01/21 1714    Specimen: Blood Updated: 04/01/21 1810     Glucose 606 mg/dL      BUN 23 mg/dL      Creatinine 1.05 mg/dL      Sodium 130 mmol/L      Potassium 4.1 mmol/L      Chloride 94 mmol/L      CO2 21.6 mmol/L      Calcium 9.6 mg/dL      Total Protein 6.4 g/dL      Albumin 3.90 g/dL      ALT (SGPT) 29 U/L      AST (SGOT) 28 U/L      Alkaline Phosphatase 180 U/L       Total Bilirubin 2.2 mg/dL      eGFR Non African Amer 54 mL/min/1.73      Globulin 2.5 gm/dL      A/G Ratio 1.6 g/dL      BUN/Creatinine Ratio 21.9     Anion Gap 14.4 mmol/L     Narrative:      GFR Normal >60  Chronic Kidney Disease <60  Kidney Failure <15      Protime-INR [[id number]]  (Abnormal) Collected: 04/01/21 1714    Specimen: Blood Updated: 04/01/21 1807     Protime 16.3 Seconds      INR 1.33    Procalcitonin [[id number]]  (Abnormal) Collected: 04/01/21 1714    Specimen: Blood Updated: 04/01/21 1804     Procalcitonin 0.34 ng/mL     Urinalysis With Culture If Indicated - Urine, Catheter In/Out [[id number]]  (Abnormal) Collected: 04/01/21 1745    Specimen: Urine, Catheter In/Out Updated: 04/01/21 1759     Color, UA Yellow     Appearance, UA Clear     pH, UA <=5.0     Specific Gravity, UA >=1.030     Glucose, UA >=1000 mg/dL (3+)     Ketones, UA 15 mg/dL (1+)     Bilirubin, UA Negative     Blood, UA Negative     Protein, UA Negative     Leuk Esterase, UA Negative     Nitrite, UA Negative     Urobilinogen, UA 0.2 E.U./dL    Narrative:      Urine microscopic not indicated.    Lipase [[id number]]  (Abnormal) Collected: 04/01/21 1714    Specimen: Blood Updated: 04/01/21 1752     Lipase 11 U/L     Ethanol [[id number]] Collected: 04/01/21 1714    Specimen: Blood Updated: 04/01/21 1752     Ethanol <10 mg/dL      Ethanol % <0.010 %     Magnesium [[id number]]  (Normal) Collected: 04/01/21 1714    Specimen: Blood Updated: 04/01/21 1752     Magnesium 1.7 mg/dL     CBC & Differential [[id number]]  (Abnormal) Collected: 04/01/21 1714    Specimen: Blood Updated: 04/01/21 1724    Narrative:      The following orders were created for panel order CBC & Differential.  Procedure                               Abnormality         Status                     ---------                               -----------         ------                     CBC Auto Differential[[id number]]        Abnormal            Final result                  Please view results for these tests on the individual orders.    CBC Auto Differential [[id number]]  (Abnormal) Collected: 04/01/21 1714    Specimen: Blood Updated: 04/01/21 1724     WBC 4.71 10*3/mm3      RBC 4.37 10*6/mm3      Hemoglobin 13.3 g/dL      Hematocrit 39.2 %      MCV 89.7 fL      MCH 30.4 pg      MCHC 33.9 g/dL      RDW 14.5 %      RDW-SD 47.1 fl      MPV 10.1 fL      Platelets 107 10*3/mm3      Neutrophil % 78.6 %      Lymphocyte % 12.5 %      Monocyte % 6.4 %      Eosinophil % 1.1 %      Basophil % 0.6 %      Immature Grans % 0.8 %      Neutrophils, Absolute 3.70 10*3/mm3      Lymphocytes, Absolute 0.59 10*3/mm3      Monocytes, Absolute 0.30 10*3/mm3      Eosinophils, Absolute 0.05 10*3/mm3      Basophils, Absolute 0.03 10*3/mm3      Immature Grans, Absolute 0.04 10*3/mm3      nRBC 0.0 /100 WBC           Imaging Results (Last 7 Days)     Procedure Component Value Units Date/Time    US Paracentesis [[id number]] Collected: 04/05/21 1306    Specimen: Body Fluid Updated: 04/05/21 1309    Narrative:      ULTRASOUND-GUIDED PARACENTESIS     HISTORY: Abdominal distention, suspected ascites     The risks, benefits and alternatives of the procedure were discussed  with the patient and informed consent was obtained. Prior to the  procedure ultrasound of the abdomen was performed to evaluate for  ascites. However, no ascites was present, and paracentesis was not  performed.          Impression:      No ascites present. Paracentesis not performed     This report was finalized on 4/5/2021 1:06 PM by Dr. Rich Mendoza M.D.       XR Abdomen KUB [[id number]] Collected: 04/03/21 1611     Updated: 04/03/21 1631    Narrative:      ONE-VIEW ABDOMEN     HISTORY: Abdominal pain. Previous TIPS procedure for cirrhosis.     FINDINGS: There are multiple nondilated small bowel loops clustered in  the midabdomen and likely related to a large amount of ascites. There is  a TIPS stent in the liver as also noted on the CT scan  dated 02/03/2021.  The spleen is enlarged as also seen on the previous CT scan.     This report was finalized on 4/3/2021 4:28 PM by Dr. Garett Hess M.D.       XR Abdomen 2+ VW with Chest 1 VW [[id number]] Collected: 04/01/21 1824     Updated: 04/01/21 1829    Narrative:      PROCEDURE:  XR ABDOMEN 2+ VIEWS W CHEST 1 VW-     HISTORY: Shortness of breath, vomiting, weakness, concern for  obstruction.     COMPARISON: Chest radiograph 02/03/2021. CT abdomen and pelvis  02/03/2021.     FINDINGS:       3 views of the chest, abdomen, and pelvis were obtained.  The cardiac silhouette and mediastinal and hilar contours are within  normal limits. Lungs and pleural spaces are clear.  There are no dilated bowel loops. There is relative paucity of small  bowel gas, which can be seen with collapsed or fluid-filled bowel loops.  If there is clinical concern for obstruction, this would be better  assessed with CT. There is a mild-to-moderate colonic stool burden.  There is no radiographic evidence of free air below the diaphragm. The  splenic shadow is enlarged. There is a tips projecting over the right  upper quadrant. There are surgical clips projecting over the right upper  quadrant. The visualized osseous structures are age-appropriate in  appearance.     This report was finalized on 4/1/2021 6:26 PM by Dr. Anisha Martinez M.D.           Orders (last 7 days)      Start     Ordered    04/05/21 1800  lubiprostone (AMITIZA) capsule 24 mcg  2 Times Daily With Meals      04/05/21 1105    04/05/21 1430  fluconazole (DIFLUCAN) tablet 200 mg  Every 24 Hours      04/05/21 1149    04/05/21 1330  albumin human 25 % IV SOLN 12.5 g  Once      04/05/21 1218    04/05/21 1330  lidocaine (XYLOCAINE) 1 % injection 10 mL  Once      04/05/21 1235    04/05/21 1235  Albumin, Fluid - Body Fluid, Peritoneum  STAT,   Status:  Canceled      04/05/21 1234    04/05/21 1235  Body Fluid Cell Count With Differential - Body Fluid, Peritoneum  STAT,   Status:   Canceled      04/05/21 1234    04/05/21 1235  Body Fluid Culture - Body Fluid, Peritoneum  STAT,   Status:  Canceled      04/05/21 1234    04/05/21 1235  Protein, Body Fluid - Body Fluid, Peritoneum  STAT,   Status:  Canceled      04/05/21 1234    04/05/21 1235  Glucose, Body Fluid - Body Fluid, Peritoneum  STAT,   Status:  Canceled      04/05/21 1234    04/05/21 1235  Body fluid cell count - Body Fluid, Peritoneum  PROCEDURE ONCE,   Status:  Canceled      04/05/21 1234    04/05/21 1219  Obtain Informed Consent  Once      04/05/21 1218    04/05/21 1200  polyethylene glycol (MIRALAX) packet 17 g  Daily      04/05/21 1105    04/05/21 1130  POC Glucose Once  Once      04/05/21 1125    04/05/21 1026  Protime-INR  STAT      04/05/21 1025    04/05/21 0958  US Paracentesis  1 Time Imaging      04/05/21 1001    04/05/21 0602  POC Glucose Once  Once      04/05/21 0600    04/05/21 0600  Basic Metabolic Panel  Morning Draw      04/04/21 1103    04/04/21 2100  insulin glargine (LANTUS, SEMGLEE) injection 50 Units  2 Times Daily      04/04/21 1100    04/04/21 2003  POC Glucose Once  Once      04/04/21 2000    04/04/21 1758  Inpatient Admission  Once      04/04/21 1758    04/04/21 1652  POC Glucose Once  Once      04/04/21 1649    04/04/21 1200  insulin lispro (ADMELOG) injection 40 Units  3 Times Daily Before Meals      04/04/21 1100    04/04/21 1200  furosemide (LASIX) tablet 80 mg  Daily      04/04/21 1103    04/04/21 1200  spironolactone (ALDACTONE) tablet 100 mg  Daily      04/04/21 1103    04/04/21 1200  Strict Intake & Output  Every Hour      04/04/21 1103    04/04/21 1200  POC Glucose Once  Once      04/04/21 1158    04/04/21 1104  Daily Weights  Daily      04/04/21 1103    04/04/21 0627  POC Glucose Once  Once      04/04/21 0624    04/04/21 0138  POC Glucose Once  Once      04/04/21 0136    04/03/21 2130  lactated ringers infusion  Continuous,   Status:  Discontinued      04/03/21 2035 04/03/21 2107  POC Glucose Once   Once      21 2104    21 1604  POC Glucose Once  Once      21 1602    21 1409  spironolactone (ALDACTONE) tablet 100 mg  Daily PRN,   Status:  Discontinued      21 1409    21 1409  furosemide (LASIX) tablet 80 mg  Daily PRN,   Status:  Discontinued      21 1409    21 1409  XR Abdomen KUB  1 Time Imaging      21 1408    21 1345  fluconazole (DIFLUCAN) tablet 100 mg  Every 24 Hours,   Status:  Discontinued      21 1255    21 1130  POC Glucose Once  Once      21 1126    21 1014  KOH Prep - Brushing, Esophagus  RELEASE UPON ORDERING      21 1014    21 1014  fluconazole (DIFLUCAN) 100 mg/50 mL IVPB  Daily,   Status:  Discontinued      21 1012    21 1014  Diet Regular; Consistent Carbohydrate  Diet Effective Now      21 1013    21 1008  Tissue Pathology Exam  RELEASE UPON ORDERING      21 1008    21 0949  sodium chloride 0.9 % infusion  Continuous PRN      21 0949    21 0745  NPO Diet  Diet Effective Now,   Status:  Canceled      21 0745    21 0608  POC Glucose Once  Once      21 0606    21 2053  POC Glucose Once  Once      21 20521 1626  PT Plan of Care Cert / Re-Cert  Once     Comments: Physical Therapy Plan of Care  Initial Certification  Certification Period: 2021 - 2021    Patient Name: Silvia Zabala  : 1960    (E10.65) Hyperglycemia due to type 1 diabetes mellitus (CMS/HCC)  (primary encounter diagnosis)  (R11.2) Non-intractable vomiting with nausea, unspecified vomiting type  (D69.6) Thrombocytopenia (CMS/HCC)  (E80.6) Hyperbilirubinemia  (K75.81,  K74.60) 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 (CMS/HCC)  (K31.84) Gastroparesis                  Assessment  PT Assessment  Criteria for Skilled Interventions Met (PT): yes, meets criteria, skilled treatment is necessary        Rehab Goal Summary     Row Name [id number]              Bed Mobility Goal 1 (PT)    Activity/Assistive Device (Bed Mobility Goal 1, PT)  bed mobility   activities, all  -MW      [place] Level/Cues Needed (Bed Mobility Goal 1, PT)  independent    -MW      Time Frame (Bed Mobility Goal 1, PT)  by discharge  -MW         Transfer Goal 1 (PT)    Activity/Assistive Device (Transfer Goal 1, PT)  transfers, all;walker,   rolling  -MW      [place] Level/Cues Needed (Transfer Goal 1, PT)  supervision   required  -MW      Time Frame (Transfer Goal 1, PT)  by discharge  -MW         Gait Training Goal 1 (PT)    Activity/Assistive Device (Gait Training Goal 1, PT)  gait (walking   locomotion);assistive device use;walker, rolling  -MW      [place] Level (Gait Training Goal 1, PT)  supervision required  -MW        Distance (Gait Training Goal 1, PT)  100  -MW      Time Frame (Gait Training Goal 1, PT)  by discharge  -MW        User Key  (r) = Recorded By, (t) = Taken By, (c) = Cosigned By    Initials Name Provider Type Discipline    Michelle Garcia, PT Physical Therapist PT              Plan  PT Plan  Therapy Frequency (PT): 5 times/wk  Predicted Duration of Therapy Intervention (PT): 5 days  Planned Therapy Interventions (PT): balance training, bed mobility training, gait training, home exercise program, patient/family education, neuromuscular re-education, postural re-education, strengthening, transfer training  Outcome Summary: Pt is a 59 yo female with h/o cirrhosis of liver d/t DUKES; gastroparesis; DM, seizure disorder; and portal HTN admitted with c/o increasing nausea and vomiting and progressive weakness and fatigue. She lives with spouse, uses rollator for mobility. She has a h/o falls and admits to not using rollator at all times but instead holds onto walls in home. She presents with impaired activity tolerance and generalized weakness. She was able to ambulate x 30 ft with RW, R knee buckling. Acute skilled PT indicated to address functional  deficits and maximize level of independence prior to d/c home.        Michelle Singhugh, PT  4/2/2021            By cosigning this order, either electronically or physically, I certify that the therapy services are furnished while this patient is under my care, the services outline above are required by this patient, and will be reviewed every 90 days.        M.D.:__________________________________________ Date: ______________    04/02/21 1625    04/02/21 1621  POC Glucose Once  Once      04/02/21 1618    04/02/21 1616  It is ok for patient to use heating pad per   Nursing Communication  Once     Comments: It is ok for patient to use heating pad per     04/02/21 1615    04/02/21 1603  Obtain informed consent  Once      04/02/21 1602    04/02/21 1547  Case Request  Once      04/02/21 1547    04/02/21 1547  N.p.o. except meds for 8 hours prior to upper endoscopy tomorrow  Misc Nursing Order (Specify)  Once     Comments: N.p.o. except meds for 8 hours prior to upper endoscopy tomorrow    04/02/21 1547    04/02/21 1322  Inpatient Infectious Diseases Consult  Once     Specialty:  Infectious Diseases  Provider:  Scottie Nogueira MD    04/02/21 1322    04/02/21 1105  POC Glucose Once  Once      04/02/21 1103    04/02/21 1104  POC Glucose Once  Once      04/02/21 1101    04/02/21 1104  Inpatient Gastroenterology Consult  Once     Specialty:  Gastroenterology  Provider:  Kimberly Gray MD    04/02/21 1104    04/02/21 0945  FLUoxetine (PROzac) capsule 10 mg  Daily      04/02/21 0848    04/02/21 0900  vitamin B-12 (CYANOCOBALAMIN) tablet 1,000 mcg  Daily      04/01/21 2050 04/02/21 0900  multivitamin with minerals 1 tablet  Daily      04/01/21 2050 04/02/21 0900  magnesium oxide (MAG-OX) tablet 400 mg  Daily      04/01/21 2050 04/02/21 0900  vitamin D (ERGOCALCIFEROL) capsule 50,000 Units  Every 7 Days      04/01/21 2050 04/02/21 0900  docusate sodium (COLACE) capsule 100 mg  Daily      04/01/21 2050     04/02/21 0800  ferrous sulfate tablet 325 mg  Daily With Breakfast      04/01/21 2050 04/02/21 0730  insulin lispro (ADMELOG) injection 60 Units  3 Times Daily Before Meals,   Status:  Discontinued      04/01/21 2050 04/02/21 0730  insulin lispro (ADMELOG) injection 0-9 Units  3 Times Daily Before Meals      04/01/21 2050 04/02/21 0704  POC Glucose Once  Once      04/02/21 0700    04/02/21 0700  pantoprazole (PROTONIX) EC tablet 40 mg  Every Morning      04/01/21 2050 04/02/21 0600  Comprehensive Metabolic Panel  Morning Draw      04/01/21 2050 04/02/21 0600  CBC Auto Differential  Morning Draw      04/01/21 2050 04/02/21 0600  Lactic Acid, Plasma  Morning Draw      04/01/21 2050 04/02/21 0000  Vital Signs  Every 4 Hours      04/01/21 2050 04/01/21 2350  Inpatient Diabetes Educator Consult  Once     Provider:  (Not yet assigned)    04/01/21 2349 04/01/21 2350  Inpatient Case Management  Consult  Once     Provider:  (Not yet assigned)    04/01/21 2349 04/01/21 2200  POC Glucose 4x Daily AC & at Bedtime  4 Times Daily Before Meals & at Bedtime     Comments: If bedtime blood glucose is greater than 350 mg/dl, call MD.      04/01/21 2050 04/01/21 2145  sucralfate (CARAFATE) tablet 1 g  4 Times Daily Before Meals & Nightly      04/01/21 2050 04/01/21 2145  riFAXIMin (XIFAXAN) tablet 550 mg  2 Times Daily      04/01/21 2050 04/01/21 2145  pregabalin (LYRICA) capsule 75 mg  Every 12 Hours Scheduled      04/01/21 2050 04/01/21 2145  mirtazapine (REMERON) tablet 15 mg  Nightly      04/01/21 2050 04/01/21 2145  metoclopramide (REGLAN) tablet 10 mg  4 Times Daily Before Meals & Nightly      04/01/21 2050 04/01/21 2145  levETIRAcetam (KEPPRA) tablet 500 mg  2 Times Daily      04/01/21 2050 04/01/21 2145  insulin glargine (LANTUS, SEMGLEE) injection 80 Units  2 Times Daily,   Status:  Discontinued      04/01/21 2050 04/01/21 2145  hydrOXYzine (ATARAX) tablet  50 mg  Nightly      04/01/21 2050 04/01/21 2145  cholestyramine light packet 8 g  2 Times Daily      04/01/21 2050 04/01/21 2145  sodium chloride 0.9 % flush 10 mL  Every 12 Hours Scheduled      04/01/21 2050 04/01/21 2145  sodium chloride 0.9 % infusion  Continuous,   Status:  Discontinued      04/01/21 2050 04/01/21 2051  PT Consult: Eval & Treat Functional Mobility Below Baseline  Once     Comments: Reason Why PT Needed: weakness    04/01/21 2050 04/01/21 2050  Telemetry - Maintain IV Access  Continuous      04/01/21 2050 04/01/21 2050  Continuous Cardiac Monitoring  Continuous      04/01/21 2050 04/01/21 2050  May Be Off Telemetry for Tests  Continuous      04/01/21 2050 04/01/21 2050  ACLS Protocol For Life Threatening Dysrhythmias (Unless Code Status Indicates Otherwise)  Continuous      04/01/21 2050 04/01/21 2050  Notify Provider if ACLS Protocol Activated  Until Discontinued      04/01/21 2050 04/01/21 2050  Diet Regular; Cardiac, Consistent Carbohydrate, Renal  Diet Effective Now,   Status:  Canceled      04/01/21 2050 04/01/21 2049  nitroglycerin (NITROSTAT) SL tablet 0.4 mg  Every 5 Minutes PRN      04/01/21 2050 04/01/21 2049  Code Status and Medical Interventions:  Continuous      04/01/21 2050 04/01/21 2049  Intake & Output  Every Shift      04/01/21 2050 04/01/21 2049  Weigh Patient  Once      04/01/21 2050 04/01/21 2049  Oxygen Therapy- Nasal Cannula; Titrate for SPO2: 90% - 95%  Continuous      04/01/21 2050 04/01/21 2049  Insert Peripheral IV  Once      04/01/21 2050 04/01/21 2049  Saline Lock & Maintain IV Access  Continuous,   Status:  Canceled      04/01/21 2050 04/01/21 2049  Do NOT Hold Basal or Correction Scale Insulin When Patient is NPO, Hold Scheduled Mealtime (Bolus) Insulin if NPO  Continuous      04/01/21 2050 04/01/21 2049  Follow [facility] Hypoglycemia Standing Orders For Blood Glucose Less Than 70 mg/dL  Until Discontinued      Comments: ALERT PATIENT - NOT NPO & CAN SAFELY SWALLOW  Administer 4 oz Fruit Juice OR 4 oz Regular Soda OR 8 oz Milk OR 15-30 grams (1 tube) of Glucose Gel.  Recheck Blood Glucose Approximately 15 Minutes After Ingestion, Repeat Treatment & Continue to Recheck Blood Sugar Approximately Every 15 Minutes Until Blood Glucose is 70 or Higher.  Once Blood Glucose is 70 or Higher & if It Will Be More Than 60 Minutes Until Next Meal, Provide Appropriate Snack (Including Carbohydrate Food) Based on Meal Plan Order. Give Meal Tray As Soon As Possible.    PATIENT HAS IV ACCESS - UNRESPONSIVE, NPO OR UNABLE TO SAFELY SWALLOW  Administer 25g (50ml) D50W IV Push.  Recheck Blood Glucose Approximately 15 Minutes After Administration, if Blood Glucose Remains Less Than 70, Repeat Treatment   Recheck Blood Glucose Approximately 15 Minutes After 2nd Administration, if Blood Glucose Remains Less Than 70 After 2nd Dose of D50W, Contact Provider for Further Treatment Orders & Consider Adding IVF With D5W for Maintenance    PATIENT WITHOUT IV ACCESS - UNRESPONSIVE, NPO OR UNABLE TO SAFELY SWALLOW  Administer 1mg Glucagon SQ & Establish IV Access.  Turn Patient on Side - Nausea / Vomiting May Occur.  Recheck Blood Glucose Approximately 15 Minutes After Administration.  If Blood Glucose Remains Less Than 70, Administer 25g D50W IV Push (50ml).  Recheck Blood Glucose Approximately 15 Minutes After Administration of D50W, if Blood Glucose Remains Less Than 70, Contact Provider for Further Treatment Orders & Consider Adding IVF With D5 for Maintenance    Document Event & Patient Response to Interventions in EMR, Document Medications on MAR  Notify Provider if Hypoglycemia Treatment Needed    04/01/21 2050 04/01/21 2049  Place Sequential Compression Device  Once      04/01/21 2050 04/01/21 2049  Maintain Sequential Compression Device  Continuous      04/01/21 2050 04/01/21 2048  dextrose (GLUTOSE) oral gel 15 g  Every 15 Minutes  PRN      04/01/21 2050 04/01/21 2048  dextrose (D50W) 25 g/ 50mL Intravenous Solution 25 g  Every 15 Minutes PRN      04/01/21 2050 04/01/21 2048  glucagon (human recombinant) (GLUCAGEN DIAGNOSTIC) injection 1 mg  Every 15 Minutes PRN      04/01/21 2050 04/01/21 2048  sodium chloride 0.9 % flush 10 mL  As Needed      04/01/21 2050 04/01/21 2048  ALPRAZolam (XANAX) tablet 0.5 mg  2 Times Daily PRN      04/01/21 2050 04/01/21 2046  oxyCODONE (ROXICODONE) immediate release tablet 7.5 mg  Every 8 Hours PRN      04/01/21 2050 04/01/21 2044  POC Glucose Once  Once      04/01/21 2041 04/01/21 2035  Timed Lactic Acid, Reflex  PROCEDURE ONCE      04/01/21 1818    04/01/21 1905  POC Glucose Once  Once      04/01/21 1903    04/01/21 1904  Initiate Observation Status  Once      04/01/21 1904    04/01/21 1828  [facility] (on-call MD unless specified) Details  Once     Specialty:  Hospitalist  Provider:  Scottie Nogueira MD    04/01/21 1827    04/01/21 1823  insulin regular (humuLIN R,novoLIN R) injection 10 Units  Once      04/01/21 1821 04/01/21 1823  cefTRIAXone (ROCEPHIN) IVPB 1 g  Once      04/01/21 1821    04/01/21 1811  POC Glucose STAT  STAT      04/01/21 1810 04/01/21 1712  lactated ringers bolus 1,000 mL  Once      04/01/21 1710 04/01/21 1712  diphenhydrAMINE (BENADRYL) injection 12.5 mg  Once      04/01/21 1710    04/01/21 1712  droperidol (INAPSINE) injection 2.5 mg  Once      04/01/21 1710 04/01/21 1710  XR Abdomen 2+ VW with Chest 1 VW  1 Time Imaging      04/01/21 1710    04/01/21 1710  Insert peripheral IV  Once      04/01/21 1710 04/01/21 1710  Pulse Oximetry, Continuous  Continuous      04/01/21 1710 04/01/21 1710  Cardiac Monitoring  Once,   Status:  Canceled      04/01/21 1710 04/01/21 1709  sodium chloride 0.9 % flush 10 mL  As Needed      04/01/21 1710 04/01/21 1709  COVID PRE-OP / PRE-PROCEDURE SCREENING ORDER (NO ISOLATION) - Swab, Nasopharynx  Once      04/01/21 1710     04/01/21 1709  CBC & Differential  Once      04/01/21 1710    04/01/21 1709  Comprehensive Metabolic Panel  Once      04/01/21 1710    04/01/21 1709  Protime-INR  Once      04/01/21 1710    04/01/21 1709  Lipase  Once      04/01/21 1710    04/01/21 1709  Urinalysis With Culture If Indicated - Urine, Catheter In/Out  Once      04/01/21 1710    04/01/21 1709  [place] Draw  Once      04/01/21 1710 04/01/21 1709  Lactic Acid, Plasma  Once      04/01/21 1710    04/01/21 1709  Procalcitonin  Once      04/01/21 1710    04/01/21 1709  Blood Culture - Blood, Arm, Right  Once      04/01/21 1710    04/01/21 1709  Blood Culture - Blood, Arm, Left  Once      04/01/21 1710 04/01/21 1709  Urine Drug Screen - Urine, Catheter  Once      04/01/21 1710 04/01/21 1709  Ethanol  Once      04/01/21 1710    04/01/21 1709  Magnesium  Once      04/01/21 1710 04/01/21 1709  ECG 12 Lead  Once      04/01/21 1710 04/01/21 1709  COVID-19,[facility] IN-HOUSE CEPHEID, NP SWAB IN TRANSPORT MEDIA 8-12 HR TAT - Swab, Nasopharynx  PROCEDURE ONCE      04/01/21 1710    04/01/21 1709  CBC Auto Differential  PROCEDURE ONCE      04/01/21 1710    04/01/21 1709  Light Blue Top  PROCEDURE ONCE      04/01/21 1710    04/01/21 1709  Green Top (Gel)  PROCEDURE ONCE      04/01/21 1710    04/01/21 1709  Lavender Top  PROCEDURE ONCE      04/01/21 1710 04/01/21 1709  Gold Top - SST  PROCEDURE ONCE      04/01/21 1710    Unscheduled  Telemetry - Pulse Oximetry  Continuous PRN     Comments: If Patient Develops Unresponsiveness, Acute Dyspnea, Cyanosis or Suspected Hypoxemia Start Continuous Pulse Ox Monitoring, Apply Oxygen & Notify Provider    04/01/21 2050    Unscheduled  Oxygen Therapy- Nasal Cannula; Titrate for SPO2: 90% - 95%  Continuous PRN     Comments: If Patient Develops Unresponsiveness, Acute Dyspnea, Cyanosis or Suspected Hypoxemia Start Continuous Pulse Ox Monitoring, Apply Oxygen & Notify Provider    04/01/21 2050    Unscheduled  ECG 12 Lead   As Needed     Comments: Nurse to Release if Patient Expericences Acute Chest Pain or Dysrhythmias    04/01/21 2050    Unscheduled  Potassium  As Needed     Comments: For Ventricular Arrhythmias      04/01/21 2050    Unscheduled  Magnesium  As Needed     Comments: For Ventricular Arrhythmias      04/01/21 2050    Unscheduled  Troponin  As Needed     Comments: For Chest Pain      04/01/21 2050    Unscheduled  Digoxin Level  As Needed     Comments: For Atrial Arrhythmias      04/01/21 2050    Unscheduled  Blood Gas, Arterial -  As Needed     Comments: Per O2 PolicyNotify Physician      04/01/21 2050    Unscheduled  Fleet Enema  As Needed     Comments: Okay for enema once daily as needed for constipation    04/05/21 1105    --  FLUoxetine (PROzac) 10 MG capsule  Daily      04/02/21 0640    --  promethazine (PHENERGAN) 25 MG tablet  Every 8 Hours PRN      04/02/21 0640    Pending  Discharge patient  Once      Pending    Pending  Continuous Blood Gluc Sensor (FreeStyle Roseline 14 Day Sensor) misc      Pending    --  UPPER GI ENDOSCOPY      04/03/21 0933                Operative/Procedure Notes (last 7 days) (Notes from 03/29/21 1417 through 04/05/21 1417)      Procedures signed by Endy Vasquez MD at 04/03/21 1020       Procedure Orders    1. UPPER GI ENDOSCOPY [[id number]] ordered by Endy Vasquez MD at 04/03/21 0933          Scan on 4/3/2021 0933 by Endy Vasquez MD: EGD          Electronically signed by Endy Vasquez MD at 04/03/21 1020          Physician Progress Notes (last 7 days) (Notes from 03/29/21 1417 through 04/05/21 1417)      Katheryn Barrett APRN at 04/05/21 1102          Gastroenterology   Inpatient Progress Note    Reason for Follow Up:  Gastroparesis, nausea, vomiting, Wong cirrhosis, constipation    Subjective  Interval History:   Patient reports worsening constipation.    She had small pellet stools overnight.  She has pain, located in the mid lower abdomen described as a dull ache and cramping.         She has used MiraLAX in the past but has difficulty having regular bowel movements as this was not effective by itself.  She has also used Linzess in the past many years ago but is uncertain of the response.  Also prior use of lactulose was not tolerated given cramping and abdominal pain.    She is on cholestyramine for pruritus.  No episodes of vomiting but does experience nausea at times.    Review of Systems:               Gastroenterology positive for constipation and abdominal pain, swelling    Objective     Vital Signs  Temp:  [98 °F (36.7 °C)-98.6 °F (37 °C)] 98.6 °F (37 °C)  Heart Rate:  [] 104  Resp:  [16] 16  BP: (137-154)/(64-74) 146/72  Body mass index is 28.8 kg/m².                  Physical Exam:              General: patient awake, alert and cooperative, appears stated age              Eyes: no scleral icterus              Skin: warm and dry, not jaundiced              Abdomen: soft, normal bowel sounds, abdominal discomfort diffuse with light palpation, mild distention.              Psychiatric: Appropriate affect and behavior                Results Review:                I reviewed the patient's new clinical results.      Radiology:  XR Abdomen KUB   Final Result      XR Abdomen 2+ VW with Chest 1 VW   Final Result      US Paracentesis    (Results Pending)     Assessment/Plan     Assessment:  1. Wong cirrhosis with ascites on KUB  2. Gastroparesis with nausea and vomiting  3. Chronic constipation  4. Portal hypertensive gastropathy noted on recent EGD      Plan:  · No evidence of outlet obstruction on recent EGD, continue metoclopramide and smaller more frequent meals for gastroparesis  · Multiple causes for constipation including chronic idiopathic constipation, delayed transit in a patient with gastroparesis and medication side effect would recommend continuing cholestyramine but using lowest dose possible for control of pruritus as this can cause constipation as discussed with patient  "and nurse during today's visit.  · For constipation, will add polyethylene glycol 17 g x 1 daily and Amitiza 24 mcg 1 tablet twice daily with food.  · Patient has previously been on Linzess years ago but does not remember the response.  She has been using MiraLAX at home with minimal improvement in symptoms.  Prior use of lactulose causes abdominal cramping and is not well tolerated.  · Wong cirrhosis with ascites, paracentesis planned with primary, will continue to monitor    I discussed the patients findings and my recommendations with patient and nursing staff.           Katheryn MALDONADO  [facility]  [address] [place] [address]  Office: [phone]      Electronically signed by Katheryn Barrett APRN at 21 1114     Scottie Nogueira MD at 21 0032            Infectious Diseases Progress Note    Scottie Nogueira MD     [facility]  Los: 1 day  Patient Identification:  Name: Silvia Zaabla  Age: 60 y.o.  Sex: female  :  1960  MRN: [id number]         Primary Care Physician: Kulwant Martínez APRN    Subjective: Complaining of being weak and having issues with bowel movement.  Feels like she has triplets in her belly that needs to be dealt with.  Denies any specific pain but more of distended bloated feeling.  Denies any hematemesis or melena denies any fever and chills.  Interval History: See admission history and physical done as cross cover for primary medical team.    Objective:    Vital signs in last 24 hours:  Temp:  [98 °F (36.7 °C)-98.6 °F (37 °C)] 98.6 °F (37 °C)  Heart Rate:  [] 104  Resp:  [16] 16  BP: (137-154)/(64-74) 146/72    Intake/Output:    Exam:  /72 (BP Location: Right arm, Patient Position: Lying)   Pulse 104   Temp 98.6 °F (37 °C) (Oral)   Resp 16   Ht 170.2 cm (67\")   Wt 83.4 kg (183 lb 14.4 oz)   LMP  (LMP Unknown)   SpO2 97%   BMI 28.80 kg/m²   Patient is examined using the personal " protective equipment as per guidelines from infection control for this particular patient as enacted.  Hand washing was performed before and after patient interaction.  General Appearance:    Alert, cooperative, no distress, AAOx3 and appears to have a better color in appearance compared to 24 hours ago.                          Head:    Normocephalic, without obvious abnormality, atraumatic                           Eyes:    PERRL, conjunctivae/corneas clear, EOM's intact, both eyes                         Throat:   Lips, tongue, gums normal; oral mucosa pink and moist                           Neck:   Supple, symmetrical, trachea midline, no JVD                         Lungs:    Clear to auscultation bilaterally, respirations unlabored                 Chest Wall:    No tenderness or deformity                          Heart:    Regular rate and rhythm, S1 and S2 normal                  Abdomen:     Soft, non-tender, bowel sounds active, distended, no guarding rigidity or rebound noted                 Extremities:   Extremities normal, atraumatic, no cyanosis or edema                        Pulses:   Pulses palpable in all extremities                            Skin:   Skin is warm and dry,  no rashes or palpable lesions                  Neurologic:   CNII-XII intact, motor strength grossly intact     Data Review:    I reviewed the patient's new clinical results.    Microbiology Results (last 10 days)     Procedure Component Value - Date/Time    KOH Prep - Brushing, Esophagus [[id number]] Collected: 04/03/21 1012    Lab Status: Final result Specimen: Brushing from Esophagus Updated: 04/03/21 1307     KOH Prep No yeast or hyphal elements seen    Blood Culture - Blood, Arm, Right [[id number]] Collected: 04/01/21 1735    Lab Status: Preliminary result Specimen: Blood from Arm, Right Updated: 04/04/21 1800     Blood Culture No growth at 3 days    COVID PRE-OP / PRE-PROCEDURE SCREENING ORDER (NO ISOLATION) - Swab,  Nasopharynx [[id number]]  (Normal) Collected: 04/01/21 1730    Lab Status: Final result Specimen: Swab from Nasopharynx Updated: 04/01/21 1904    Narrative:      The following orders were created for panel order COVID PRE-OP / PRE-PROCEDURE SCREENING ORDER (NO ISOLATION) - Swab, Nasopharynx.  Procedure                               Abnormality         Status                     ---------                               -----------         ------                     COVID-19,[facility]...[[id number]]  Normal              Final result                 Please view results for these tests on the individual orders.    COVID-19,[facility] CEPHEID, NP SWAB IN TRANSPORT MEDIA 8-12 HR TAT - Swab, Nasopharynx [[id number]]  (Normal) Collected: 04/01/21 1730    Lab Status: Final result Specimen: Swab from Nasopharynx Updated: 04/01/21 1904     COVID19 Not Detected    Narrative:      Fact sheet for providers: https://www.fda.gov/media/619576/download     Fact sheet for patients: https://www.fda.gov/media/910194/download    Blood Culture - Blood, Arm, Left [[id number]] Collected: 04/01/21 1714    Lab Status: Preliminary result Specimen: Blood from Arm, Left Updated: 04/04/21 1730     Blood Culture No growth at 3 days      Procalcitonin 0.34      Assessment:    Uncontrolled diabetes mellitus with hyperglycemia (CMS/HCC)    Gastroparesis    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 (CMS/HCC)    Intractable vomiting with nausea    BRICE (obstructive sleep apnea)    Dehydration    Elevated procalcitonin    Hyperglycemia due to type 1 diabetes mellitus (CMS/HCC)  Candida esophagitis    Recommendations/discussion:  Continue supportive care and expectant management.  Abdominal distention could very well be due to worsening ascites in this context of withholding of Lasix and IV fluid administration when she came in with severe dehydration.  Plans for paracentesis conveyed to me by the patient.  We will follow up on the fluid cell count and  differential results to see if there is any element of SBP in the situation though clinically she does not appear to have this possibility.    Scottie Nogueira MD  2021  09:58 EDT    Much of this encounter note is an electronic transcription/translation of spoken language to printed text. The electronic translation of spoken language may permit erroneous, or at times, nonsensical words or phrases to be inadvertently transcribed; Although I have reviewed the note for such errors, some may still exist      Electronically signed by Scottie Nogueira MD at 21 1000     Dontae Brower MD at 21 0949              Name: Silvia Zabala ADMIT: 2021   : 1960  PCP: Kulwant Martínez APRN    MRN: [id number] LOS: 1 days   AGE/SEX: 60 y.o. female  ROOM: [facility]     Subjective   Subjective    Still complaining of abdominal discomfort.  She had a bowel movement.  No more hypoglycemia.    Review of Systems   Constitutional: Negative for fever.   Respiratory: Negative for cough and shortness of breath.    Cardiovascular: Negative for chest pain.   Gastrointestinal: Positive for abdominal pain and constipation.     Objective   Objective   Vital Signs  Temp:  [98 °F (36.7 °C)-98.6 °F (37 °C)] 98.6 °F (37 °C)  Heart Rate:  [] 104  Resp:  [16] 16  BP: (137-154)/(64-74) 146/72  SpO2:  [97 %-98 %] 97 %  on   ;   Device (Oxygen Therapy): room air  Body mass index is 28.8 kg/m².    Physical Exam  Vitals and nursing note reviewed.   Constitutional:       General: She is not in acute distress.  HENT:      Head: Normocephalic and atraumatic.   Cardiovascular:      Rate and Rhythm: Normal rate and regular rhythm.   Pulmonary:      Effort: Pulmonary effort is normal. No respiratory distress.      Breath sounds: Normal breath sounds.   Abdominal:      General: Bowel sounds are normal. There is distension.      Palpations: Abdomen is soft.      Tenderness: There is no abdominal tenderness. There is no guarding or rebound.    Musculoskeletal:         General: No swelling.   Skin:     General: Skin is warm and dry.   Neurological:      General: No focal deficit present.      Mental Status: She is alert and oriented to person, place, and time.   Psychiatric:         Mood and Affect: Mood normal.         Behavior: Behavior normal.     Results Review  I reviewed the patient's new clinical results.    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     60 y.o. female admitted with weakness and uncontrolled diabetes.    · Diabetes with hyperglycemia, uncontrolled  · Control is fair.  No more hypoglycemia  · Continue long-acting, mealtime insulin, correctional and adjust as needed  · At home she had a continuous glucose monitor but switched to fingerstick due to some issue with supply at home  · She has ollow-up with Dr. Burnett April 22  · Dehydration due to hyperglycemia, GI losses  · Resolved  · Lactate elevation resolved  · Nausea and vomiting, history of gastroparesis  · She is on scheduled Reglan  · Status post EGD 4/3/21 ? candidiasis esophagitis: Diflucan   · Await biopsies  · DUKES, cirrhosis  · Large amount of ascites noted on KUB  · Back on diuretics- at home she was on them as needed- change to daily for now. Diuresed net about a liter.  Patient still uncomfortable will order ultrasound-guided paracentesis, albumin if more than 5 L removed.  She has had paracentesis before and  "wishes to proceed.  · Rifaximin  · Chronic thrombocytopenia from liver disease  · Elevated procalcitonin  · Appreciate ID.  No infectious work-up  · SCDs for DVT prophylaxis  · Full code  · Discussed with patient and nursing staff  · Anticipate discharge home in 1-2 days.    Dontae Brower MD  [facility]  21  09:49 EDT    I wore protective equipment throughout this patient encounter including a face mask, gloves, and protective eyewear.  Hand hygiene was performed before donning protective equipment and after removal when leaving the room.    addendum:  no enough fluid for paracentesis  see how she does with bowel medications    Dontae Brower MD  21  13:56 EDT     Electronically signed by Dontae Brower MD at 21 9546     Scottie Nogueira MD at 21 7901            Infectious Diseases Progress Note    Scottie Nogueira MD     [facility]  Los: 0 days  Patient Identification:  Name: Silvia Zabala  Age: 60 y.o.  Sex: female  :  1960  MRN: [id number]         Primary Care Physician: Kulwant Martínez APRN    Subjective: Overall feeling slightly stronger but does have some bloated feeling in the stomach.  Interval History: See admission history and physical done as cross cover for primary medical team.    Objective:    Vital signs in last 24 hours:  Temp:  [98 °F (36.7 °C)-98.1 °F (36.7 °C)] 98 °F (36.7 °C)  Heart Rate:  [90-93] 90  Resp:  [16-18] 16  BP: (137-141)/(64-74) 141/64      Exam:  /64 (BP Location: Left arm, Patient Position: Lying)   Pulse 90   Temp 98 °F (36.7 °C) (Oral)   Resp 16   Ht 170.2 cm (67\")   Wt 81.6 kg (180 lb)   LMP  (LMP Unknown)   SpO2 98%   BMI 28.19 kg/m²   Patient is examined using the personal protective equipment as per guidelines from infection control for this particular patient as enacted.  Hand washing was performed before and after patient interaction.  General Appearance:    Alert, cooperative, no distress, " AAOx3 and appears to have a better color in appearance compared to 24 hours ago.                          Head:    Normocephalic, without obvious abnormality, atraumatic                           Eyes:    PERRL, conjunctivae/corneas clear, EOM's intact, both eyes                         Throat:   Lips, tongue, gums normal; oral mucosa pink and moist                           Neck:   Supple, symmetrical, trachea midline, no JVD                         Lungs:    Clear to auscultation bilaterally, respirations unlabored                 Chest Wall:    No tenderness or deformity                          Heart:    Regular rate and rhythm, S1 and S2 normal                  Abdomen:     Soft, non-tender, bowel sounds active, distended                 Extremities:   Extremities normal, atraumatic, no cyanosis or edema                        Pulses:   Pulses palpable in all extremities                            Skin:   Skin is warm and dry,  no rashes or palpable lesions                  Neurologic:   CNII-XII intact, motor strength grossly intact     Data Review:    I reviewed the patient's new clinical results.      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  No further work-up or evaluation or treatment needed for elevated procalcitonin.    Scottie Nogueira MD  4/4/2021  21:44 EDT    Much of this encounter note is an electronic transcription/translation of spoken language to printed text. The electronic translation of spoken language may permit erroneous, or at times, nonsensical  words or phrases to be inadvertently transcribed; Although I have reviewed the note for such errors, some may still exist      Electronically signed by Scottie Nogueira MD at 21 0958     Dontae Brwoer MD at 21 1054              Name: Silvia Zabala ADMIT: 2021   : 1960  PCP: Kulwant Martínez APRN    MRN: [id number] LOS: 0 days   AGE/SEX: 60 y.o. female  ROOM: [facility]     Subjective   Subjective    Complaining of constipation today  Low blood sugar  Still some discomfort with swallowing    Review of Systems   Constitutional: Negative for fever.   HENT: Positive for trouble swallowing.    Respiratory: Negative for cough and shortness of breath.    Cardiovascular: Negative for chest pain.   Gastrointestinal: Positive for abdominal pain and constipation.     Objective   Objective   Vital Signs  Temp:  [98 °F (36.7 °C)-98.3 °F (36.8 °C)] 98 °F (36.7 °C)  Heart Rate:  [87-99] 92  Resp:  [16-18] 18  BP: (126-146)/(63-76) 140/71  SpO2:  [96 %-100 %] 100 %  on   ;   Device (Oxygen Therapy): room air  Body mass index is 28.19 kg/m².    Physical Exam  Vitals and nursing note reviewed.   Constitutional:       General: She is not in acute distress.  HENT:      Head: Normocephalic and atraumatic.   Cardiovascular:      Rate and Rhythm: Normal rate and regular rhythm.   Pulmonary:      Effort: Pulmonary effort is normal. No respiratory distress.      Breath sounds: Normal breath sounds.   Abdominal:      General: Bowel sounds are normal. There is no distension.      Palpations: Abdomen is soft.      Tenderness: There is no abdominal tenderness. There is no guarding or rebound.   Musculoskeletal:         General: No swelling.   Skin:     General: Skin is warm and dry.   Neurological:      General: No focal deficit present.      Mental Status: She is alert and oriented to person, place, and time.   Psychiatric:         Mood and Affect: Mood normal.         Behavior: Behavior normal.     Results Review  I  reviewed the patient's new clinical results.    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     60 y.o. female admitted with weakness and uncontrolled diabetes.    · Diabetes with hyperglycemia, uncontrolled  · Now with hypoglycemia.   · Decrease long-acting and mealtime insulin  · Continue correctional  · She had a continuous glucose monitor but switched to fingerstick due to some issue with supply at home  · She has ollow-up with Dr. Burnett April 22  · Dehydration due to hyperglycemia, GI losses  · Improved with fluids.  · Lactate elevation resolved  · Nausea and vomiting, history of gastroparesis  · She is on scheduled Reglan  · Status post EGD 4/3/21 Candidiasis esophagitis: Diflucan  · Await biopsies  · DUKES, cirrhosis  · Large amount of ascites noted on KUB  · Back on diuretics- at home she was on them as needed- change to daily for now. I/O, daily weights  · Rifaximin  · Chronic thrombocytopenia from liver disease  · Elevated procalcitonin  · Appreciate ID.  No infectious work-up  · SCDs for DVT prophylaxis  · Full code  · Discussed with patient and nursing staff  · Anticipate discharge home in 1-2 days.    Dontae Brower MD  [facility]  04/04/21  10:54 EDT    I wore protective equipment throughout this patient  encounter including a face mask, gloves, and protective eyewear.  Hand hygiene was performed before donning protective equipment and after removal when leaving the room.      Electronically signed by Dontae Brower MD at 04/04/21 4701     Logan Gomez MD at 04/04/21 0920          [facility]  Inpatient Progress Note    Reason for Follow Up: Gastroparesis, nausea with vomiting, DUKES cirrhosis    Subjective     Interval History: Upper endoscopy revealed esophagitis, gastritis.  Brushings and biopsies are pending.  Patient states symptoms are improving, she is eating breakfast.        Objective     Vital Signs  Temp:  [98 °F (36.7 °C)-98.3 °F (36.8 °C)] 98 °F (36.7 °C)  Heart Rate:  [85-99] 92  Resp:  [16-18] 18  BP: (108-146)/(63-76) 140/71  Body mass index is 28.19 kg/m².    No intake/output data recorded.     Physical Exam:   General: patient awake, alert and cooperative   Eyes: Normal lids and lashes, no scleral icterus   Neck: supple, normal ROM   Skin: warm and dry, not jaundiced   Cardiovascular: regular rhythm and rate, no murmurs auscultated   Pulm: clear to auscultation bilaterally, regular and unlabored   Abdomen: soft, nontender, nondistended; normal bowel sounds   Extremities: no rash or edema   Psychiatric: Normal mood and behavior; memory intact     Results Review:     I reviewed the patient's new clinical results.    Assessment/Plan     Assessment:  1. Nausea with vomiting: Known gastroparesis.  EGD negative for any outlet obstruction  2. DUKES cirrhosis: No variceal changes but portal hypertensive gastropathy noted  3. Diabetes mellitus  4. Seizure disorder  5. Morbid obesity  6. Sleep apnea      Plan:  · Continue current medical regimen  · Await biopsies and brushings from endoscopy.  I discussed the patients findings and my recommendations with patient.    Logan Gomez MD      Electronically signed by Logan Gomez MD at 04/04/21 0950     Leonarda  Dontae Dickson MD at 21 1404              Name: Silvia Zabala ADMIT: 2021   : 1960  PCP: Kulwant Martínez APRN    MRN: [id number] LOS: 0 days   AGE/SEX: 60 y.o. female  ROOM: [facility]     Subjective   Subjective    Status post EGD.  She feels her chronic abdominal pain from gastroparesis is little bit worse than usual.  Some discomfort with swallowing.  No chest pain or shortness of breath.    Review of Systems   Constitutional: Negative for fever.   Respiratory: Negative for cough and shortness of breath.    Cardiovascular: Negative for chest pain.   Gastrointestinal: Positive for abdominal pain.     Objective   Objective   Vital Signs  Temp:  [97.7 °F (36.5 °C)-98.5 °F (36.9 °C)] 97.7 °F (36.5 °C)  Heart Rate:  [85-97] 90  Resp:  [16-18] 16  BP: (108-146)/(61-76) 146/76  SpO2:  [93 %-99 %] 96 %  on  Flow (L/min):  [6-8] 6;   Device (Oxygen Therapy): room air  Body mass index is 28.19 kg/m².    Physical Exam  Vitals and nursing note reviewed.   Constitutional:       General: She is not in acute distress.  HENT:      Head: Normocephalic and atraumatic.   Cardiovascular:      Rate and Rhythm: Normal rate and regular rhythm.   Pulmonary:      Effort: Pulmonary effort is normal. No respiratory distress.      Breath sounds: Normal breath sounds.   Abdominal:      General: Bowel sounds are normal. There is distension.      Palpations: Abdomen is soft.      Tenderness: There is no abdominal tenderness. There is no guarding or rebound.   Musculoskeletal:         General: No swelling.   Skin:     General: Skin is warm and dry.   Neurological:      General: No focal deficit present.      Mental Status: She is alert and oriented to person, place, and time.   Psychiatric:         Mood and Affect: Mood normal.         Behavior: Behavior normal.     Results Review  I reviewed the patient's new clinical results.      Diet  Diet Regular; Consistent Carbohydrate    Assessment/Plan       60 y.o. female admitted with  weakness and uncontrolled diabetes.    · Diabetes with hyperglycemia, uncontrolled  · Improved.  See how she does after Diet Regular; Consistent Carbohydrate restarted  · She had a continuous glucose monitor but switched to fingerstick due to some issue with supply  · She has ollow-up with Dr. Burnett   · Dehydration due to hyperglycemia, GI losses  · Improved with fluids.  · Lactate elevation resolved  · Nausea and vomiting  · She is a history of gastroparesis followed by GI  · She is on scheduled Reglan  ·   · Status post EGD 4/3/21 Candidiasis esophagitis: Diflucan  · Check a post procedure KUB  · DUKES, cirrhosis  · Resume diuretics  · Rifaximin  · Elevated procalcitonin  · Appreciate ID.  No infectious work-up  · SCDs for DVT prophylaxis  · Full code  · Discussed with patient and nursing staff  · Anticipate discharge home in 1-2 days.    Dontae Brower MD  [facility]  21  14:04 EDT    I wore protective equipment throughout this patient encounter including a face mask, gloves, and protective eyewear.  Hand hygiene was performed before donning protective equipment and after removal when leaving the room.      Electronically signed by Dontae Brower MD at 21 1412     Scottie Nogueira MD at 21 0832            Infectious Diseases Progress Note    Scottie Nogueira MD     [facility]  Los: 0 days  Patient Identification:  Name: Silvia Zabala  Age: 60 y.o.  Sex: female  :  1960  MRN: [id number]         Primary Care Physician: Kulwant Martínez APRN    Subjective: Continues to feel better.  Had EGD performed and after EGD she developed some nausea.  Interval History: See admission history and physical done as cross cover for primary medical team.    Objective:    Vital signs in last 24 hours:  Temp:  [97.7 °F (36.5 °C)-98.5 °F (36.9 °C)] 97.7 °F (36.5 °C)  Heart Rate:  [86-97] 86  Resp:  [18] 18  BP: (117-141)/(61-70) 125/61      Exam:  /61  "(BP Location: Left arm, Patient Position: Lying)   Pulse 86   Temp 97.7 °F (36.5 °C) (Oral)   Resp 18   Ht 170.2 cm (67\")   Wt 80.8 kg (178 lb 1.6 oz)   LMP  (LMP Unknown)   SpO2 93%   BMI 27.89 kg/m²   Patient is examined using the personal protective equipment as per guidelines from infection control for this particular patient as enacted.  Hand washing was performed before and after patient interaction.  General Appearance:    Alert, cooperative, no distress, AAOx3 and appears to have a better color in appearance compared to 24 hours ago.                          Head:    Normocephalic, without obvious abnormality, atraumatic                           Eyes:    PERRL, conjunctivae/corneas clear, EOM's intact, both eyes                         Throat:   Lips, tongue, gums normal; oral mucosa pink and moist                           Neck:   Supple, symmetrical, trachea midline, no JVD                         Lungs:    Clear to auscultation bilaterally, respirations unlabored                 Chest Wall:    No tenderness or deformity                          Heart:    Regular rate and rhythm, S1 and S2 normal                  Abdomen:     Soft, non-tender, bowel sounds active                 Extremities:   Extremities normal, atraumatic, no cyanosis or edema                        Pulses:   Pulses palpable in all extremities                            Skin:   Skin is warm and dry,  no rashes or palpable lesions                  Neurologic:   CNII-XII intact, motor strength grossly intact     Data Review:    I reviewed the patient's new clinical results.    Assessment:    Uncontrolled diabetes mellitus with hyperglycemia (CMS/HCC)    Gastroparesis    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 (CMS/HCC)    Intractable vomiting with nausea    BRICE (obstructive sleep apnea)    Dehydration    Elevated procalcitonin  Candida esophagitis    Recommendations/discussion:  Continue supportive care  Continue with treatment for " Candida esophagitis  Management of hydration blood sugar control per primary team.  No further work-up or evaluation or treatment needed for elevated procalcitonin.    Scottie Nogueira MD  4/3/2021  08:32 EDT    Much of this encounter note is an electronic transcription/translation of spoken language to printed text. The electronic translation of spoken language may permit erroneous, or at times, nonsensical words or phrases to be inadvertently transcribed; Although I have reviewed the note for such errors, some may still exist      Electronically signed by Scottie Nogueira MD at 21 3159     Dontae Brower MD at 21 1314              Name: Silvia Zabala ADMIT: 2021   : 1960  PCP: Kulwant Martínez APRN    MRN: [id number] LOS: 0 days   AGE/SEX: 60 y.o. female  ROOM: [facility]     Subjective   Subjective   Denies any weakness.  She does report some nausea.  She stated that she was dehydrated, urine was dark.    Review of Systems   Constitutional: Negative for fever.   Respiratory: Negative for cough and shortness of breath.    Cardiovascular: Negative for chest pain.   Gastrointestinal: Positive for nausea.     Objective   Objective   Vital Signs  Temp:  [97.3 °F (36.3 °C)-98.4 °F (36.9 °C)] 98.2 °F (36.8 °C)  Heart Rate:  [] 81  Resp:  [16-18] 18  BP: (124-154)/(62-84) 131/62  SpO2:  [91 %-98 %] 91 %  on   ;   Device (Oxygen Therapy): room air  Body mass index is 27.89 kg/m².    Physical Exam  Vitals and nursing note reviewed.   Constitutional:       General: She is not in acute distress.  HENT:      Head: Normocephalic and atraumatic.   Cardiovascular:      Rate and Rhythm: Normal rate and regular rhythm.   Pulmonary:      Effort: Pulmonary effort is normal. No respiratory distress.      Breath sounds: Normal breath sounds.   Abdominal:      General: Bowel sounds are normal.      Palpations: Abdomen is soft.      Tenderness: There is no abdominal tenderness. There is no guarding or rebound.    Musculoskeletal:         General: No swelling.   Skin:     General: Skin is warm and dry.   Neurological:      General: No focal deficit present.      Mental Status: She is alert and oriented to person, place, and time.   Psychiatric:         Mood and Affect: Mood normal.         Behavior: Behavior normal.     Results Review  I reviewed the patient's new clinical results.    Diet  Diet Regular; Cardiac, Consistent Carbohydrate, Renal    Assessment/Plan       60 y.o. female admitted with weakness and uncontrolled diabetes.    · Diabetes with hyperglycemia, uncontrolled  · Improved some but not ideal  · Discussed with diabetic nurse educator  · She had a continuous glucose monitor but switched to fingerstick due to some issue with supply  · She has ollow-up with Dr. Burnett April 22  · Dehydration due to hyperglycemia, GI losses  · Improved with fluids.  · Lactate improved   · Nausea and vomiting  · She is a history of gastroparesis followed by GI  · She is on scheduled Reglan  ·   · DUKES, cirrhosis  · Likely can resume diuretics soon  · Rifaximin  · Elevated procalcitonin  · Patient without fever, leukocytosis  · Lactate elevation improved with hydration  · Chest x-ray without pneumonia  · Will obtain CT abdomen/pelvis and ask ID to see  · SCDs for DVT prophylaxis  · Full code  · Discussed with patient, nursing staff, CCP and care team on multidisciplinary rounds  · Anticipate discharge home in 1-2 days.    Dontae Brower MD  [facility]  04/02/21  13:14 EDT    I wore protective equipment throughout this patient encounter including a face mask, gloves, and protective eyewear.  Hand hygiene was performed before donning protective equipment and after removal when leaving the room.      Electronically signed by Dontae Brower MD at 04/02/21 1329          Consult Notes (last 5 days) (Notes from 04/01/21 through 04/05/21)      Logan Gomez MD at 04/02/21 1540      Consult Orders  "   1. Inpatient Gastroenterology Consult [[id number]] ordered by Dontae Brower MD at 04/02/21 1104               [facility]  Initial Inpatient Consult Note    Referring Provider: Kulwant BRADFORD    Reason for Consultation: Gastroparesis, nausea with vomiting, WONG cirrhosis    Subjective     History of present illness:    60 y.o. female previously seen by Dr. Rich Coleman currently followed by Dr. Kimberly Gray with a history of Wong cirrhosis and esophageal varices status post TIPS procedure as well as banding of varices and diabetes mellitus with gastroparesis.  She recounts her last upper endoscopy was done at [facility] in [place] in October 2020, per her account study did not reveal varices or explain her episodes of nausea and vomiting at that time.  She has had issues with nausea and vomiting for the past 3 weeks.  No reported hematemesis no reported melena or hematochezia.  She denies anticoagulant therapy.  She has been unable to keep her medications down therefore unable to control her gastroparesis with metoclopramide.      Allergies:  Allergies   Allergen Reactions   • Albuterol Anaphylaxis   • Imitrex [Sumatriptan] Anaphylaxis   • Tramadol Nausea Only, Other (See Comments) and GI Intolerance     Per pt causes her \"palsy, meaning shaking and tremors\"    • Nsaids Other (See Comments)     Told by MD not to take due to liver problems   • Tylenol [Acetaminophen] Other (See Comments)     Has liver problems and told by MD to not take   • Zofran [Ondansetron Hcl] Other (See Comments)     Pt states does not work to correct nausea and vomiting.    • Lactulose Nausea And Vomiting and Other (See Comments)     Severe abdominal pain   • Quinine Derivatives Nausea And Vomiting     Review of Systems  Pertinent items are noted in HPI, all other systems reviewed and negative     Objective     Vital Signs  Temp:  [97.3 °F (36.3 °C)-98.4 °F (36.9 °C)] 98 °F (36.7 °C)  Heart Rate:  " [] 97  Resp:  [16-18] 18  BP: (124-154)/(62-84) 138/67  Physical Exam:  General Appearance:    Alert, cooperative, in no acute distress   Head:    Normocephalic, without obvious abnormality, atraumatic   Eyes:          conjunctivae and sclerae normal, no   icterus   Throat:   no thrush, oral mucosa moist   Neck:   Supple, no adenopathy   Lungs:     Clear to auscultation bilaterally    Heart:    Regular rhythm and normal rate    Chest Wall:    No abnormalities observed   Abdomen:     Soft, nondistended, nontender; normal bowel sounds   Extremities:   no edema, no redness   Skin:   No bruising or rash   Psychiatric:  normal mood and insight     Results Review:   I reviewed the patient's new clinical results.    Assessment/Plan     Assessment:  3. Nausea with vomiting: Known gastroparesis, cannot rule out outlet obstruction or exacerbation of her delayed emptying.  4. DUKES cirrhosis: History of varices but status post TIPS placement no further varices seen.  5. Diabetes mellitus  6. Seizure disorder  7. Morbid obesity  8. Sleep apnea    Plan:  · Anticipate EGD tomorrow  · Can administer IV metoclopramide and PPI  · Further recommendations to follow endoscopic assessment      I discussed the patients findings and my recommendations with patient.    Logan Gomez MD      Electronically signed by Logan Gomez MD at 21 1556     Scottie Nogueira MD at 21 2146      Consult Orders    1. Inpatient Infectious Diseases Consult [[id number]] ordered by Dontae Brower MD at 21 1322                 Infectious Diseases Progress Note    Scottie Nogueira MD     [facility]  Los: 0 days  Patient Identification:  Name: Silvia Zabala  Age: 60 y.o.  Sex: female  :  1960  MRN: [id number]         Primary Care Physician: Kulwant Martínez APRN      Subjective: Overall feeling much better compared to yesterday.  Interval History: See admission history and physical done as cross cover for  "primary medical team.    Objective:  Vital signs in last 24 hours:  Temp:  [98 °F (36.7 °C)-98.5 °F (36.9 °C)] 98.5 °F (36.9 °C)  Heart Rate:  [81-97] 88  Resp:  [18] 18  BP: (117-138)/(62-67) 117/62      Exam:  /62 (BP Location: Left arm, Patient Position: Lying)   Pulse 88   Temp 98.5 °F (36.9 °C) (Oral)   Resp 18   Ht 170.2 cm (67\")   Wt 80.8 kg (178 lb 1.6 oz)   LMP  (LMP Unknown)   SpO2 97%   BMI 27.89 kg/m²   Patient is examined using the personal protective equipment as per guidelines from infection control for this particular patient as enacted.  Hand washing was performed before and after patient interaction.  General Appearance:    Alert, cooperative, no distress, AAOx3 and appears to have a better color in appearance compared to 24 hours ago.                          Head:    Normocephalic, without obvious abnormality, atraumatic                           Eyes:    PERRL, conjunctivae/corneas clear, EOM's intact, both eyes                         Throat:   Lips, tongue, gums normal; oral mucosa pink and moist                           Neck:   Supple, symmetrical, trachea midline, no JVD                         Lungs:    Clear to auscultation bilaterally, respirations unlabored                 Chest Wall:    No tenderness or deformity                          Heart:    Regular rate and rhythm, S1 and S2 normal                  Abdomen:     Soft, non-tender, bowel sounds active                 Extremities:   Extremities normal, atraumatic, no cyanosis or edema                        Pulses:   Pulses palpable in all extremities                            Skin:   Skin is warm and dry,  no rashes or palpable lesions                  Neurologic:   CNII-XII intact, motor strength grossly intact     Data Review:    I reviewed the patient's new clinical results.      Microbiology Results (last 10 days)        Assessment:    Uncontrolled diabetes mellitus with hyperglycemia (CMS/HCC)    Gastroparesis   "  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 (CMS/HCC)    Intractable vomiting with nausea    BRICE (obstructive sleep apnea)    Dehydration    Elevated procalcitonin      Recommendations/discussion:  Patient has improved significantly and most of her plight can be explained by severe dehydration and uncontrolled diabetes with hyperglycemia.  The significance of her procalcitonin level in the setting of underlying liver disease with no other localizing symptoms of sepsis is unclear.  At this juncture would not recommend any further work-up or antibiotic treatment for elevated procalcitonin level.  What she needs is aggressive supportive care and correction of her metabolic abnormalities while providing her with exhaustive review of systems identify evolving infectious process such as UTI pyelonephritis aspiration pneumonia cellulitis etc. for which specific antimicrobial regimen can be started and duration of treatment can be decided.  In the absence of specific infectious syndrome or evidence of sepsis besides elevated procalcitonin , treatment with antibiotic therapy may result in more harm than any benefit.  Would not recommend repeating procalcitonin level unless patient shows other symptoms or signs suggestive of evolving sepsis.  Thank you Dr. Brower for letting me be the part of your patient care please see above impression and recommendations    Scottie Nogueira MD  4/2/2021  21:46 EDT    Much of this encounter note is an electronic transcription/translation of spoken language to printed text. The electronic translation of spoken language may permit erroneous, or at times, nonsensical words or phrases to be inadvertently transcribed; Although I have reviewed the note for such errors, some may still exist      Electronically signed by Scottie Nogueira MD at 04/03/21 0144

## 2021-04-05 NOTE — PLAN OF CARE
Goal Outcome Evaluation:         Pt resting in bed quietly. Medicated prn for pain. On room air. Up independently to bsc.

## 2021-04-05 NOTE — PROGRESS NOTES
Infectious Diseases Progress Note    Scottie Nogueira MD     [facility]  Los: 0 days  Patient Identification:  Name: Silvia Zabala  Age: 60 y.o.  Sex: female  :  1960  MRN: [id number]         Primary Care Physician: Kulwant Martínez APRN            Subjective: Overall feeling slightly stronger but does have some bloated feeling in the stomach.  Interval History: See admission history and physical done as cross cover for primary medical team.    Objective:    Scheduled Meds:cholestyramine light, 8 g, Oral, BID  docusate sodium, 100 mg, Oral, Daily  ferrous sulfate, 325 mg, Oral, Daily With Breakfast  fluconazole, 100 mg, Oral, Q24H  FLUoxetine, 10 mg, Oral, Daily  furosemide, 80 mg, Oral, Daily  hydrOXYzine, 50 mg, Oral, Nightly  insulin glargine, 50 Units, Subcutaneous, BID  insulin lispro, 0-9 Units, Subcutaneous, TID AC  insulin lispro, 40 Units, Subcutaneous, TID AC  levETIRAcetam, 500 mg, Oral, BID  magnesium oxide, 400 mg, Oral, Daily  metoclopramide, 10 mg, Oral, 4x Daily AC & at Bedtime  mirtazapine, 15 mg, Oral, Nightly  multivitamin with minerals, 1 tablet, Oral, Daily  pantoprazole, 40 mg, Oral, QAM  pregabalin, 75 mg, Oral, Q12H  riFAXIMin, 550 mg, Oral, BID  sodium chloride, 10 mL, Intravenous, Q12H  spironolactone, 100 mg, Oral, Daily  sucralfate, 1 g, Oral, 4x Daily AC & at Bedtime  vitamin B-12, 1,000 mcg, Oral, Daily  vitamin D, 50,000 Units, Oral, Q7 Days      Continuous Infusions:sodium chloride, 30 mL/hr, Last Rate: Stopped (21 1058)        Vital signs in last 24 hours:  Temp:  [98 °F (36.7 °C)-98.1 °F (36.7 °C)] 98 °F (36.7 °C)  Heart Rate:  [90-93] 90  Resp:  [16-18] 16  BP: (137-141)/(64-74) 141/64    Intake/Output:    Intake/Output Summary (Last 24 hours) at 2021 0923  Last data filed at 2021  Gross per 24 hour   Intake 4560.41 ml   Output 3000 ml   Net 1560.41 ml       Exam:  /64 (BP Location: Left arm, Patient Position: Lying)   Pulse 90   Temp  "98 °F (36.7 °C) (Oral)   Resp 16   Ht 170.2 cm (67\")   Wt 81.6 kg (180 lb)   LMP  (LMP Unknown)   SpO2 98%   BMI 28.19 kg/m²   Patient is examined using the personal protective equipment as per guidelines from infection control for this particular patient as enacted.  Hand washing was performed before and after patient interaction.  General Appearance:    Alert, cooperative, no distress, AAOx3 and appears to have a better color in appearance compared to 24 hours ago.                          Head:    Normocephalic, without obvious abnormality, atraumatic                           Eyes:    PERRL, conjunctivae/corneas clear, EOM's intact, both eyes                         Throat:   Lips, tongue, gums normal; oral mucosa pink and moist                           Neck:   Supple, symmetrical, trachea midline, no JVD                         Lungs:    Clear to auscultation bilaterally, respirations unlabored                 Chest Wall:    No tenderness or deformity                          Heart:    Regular rate and rhythm, S1 and S2 normal                  Abdomen:     Soft, non-tender, bowel sounds active, distended                 Extremities:   Extremities normal, atraumatic, no cyanosis or edema                        Pulses:   Pulses palpable in all extremities                            Skin:   Skin is warm and dry,  no rashes or palpable lesions                  Neurologic:   CNII-XII intact, motor strength grossly intact     Data Review:    I reviewed the patient's new clinical results.  Results from last 7 days   Lab Units 04/02/21  0525 04/01/21  1714   WBC 10*3/mm3 4.08 4.71   HEMOGLOBIN g/dL 11.2* 13.3   PLATELETS 10*3/mm3 95* 107*     Results from last 7 days   Lab Units 04/02/21  0525 04/01/21  1714   SODIUM mmol/L 132* 130*   POTASSIUM mmol/L 4.3 4.1   CHLORIDE mmol/L 101 94*   CO2 mmol/L 21.4* 21.6*   BUN mg/dL 20 23*   CREATININE mg/dL 0.82 1.05*   CALCIUM mg/dL 8.5* 9.6   GLUCOSE mg/dL 367* 606* "     Microbiology Results (last 10 days)     Procedure Component Value - Date/Time    KOH Prep - Brushing, Esophagus [[id number]] Collected: 04/03/21 1012    Lab Status: Final result Specimen: Brushing from Esophagus Updated: 04/03/21 1307     KOH Prep No yeast or hyphal elements seen    Blood Culture - Blood, Arm, Right [[id number]] Collected: 04/01/21 1735    Lab Status: Preliminary result Specimen: Blood from Arm, Right Updated: 04/04/21 1800     Blood Culture No growth at 3 days    COVID PRE-OP / PRE-PROCEDURE SCREENING ORDER (NO ISOLATION) - Swab, Nasopharynx [[id number]]  (Normal) Collected: 04/01/21 1730    Lab Status: Final result Specimen: Swab from Nasopharynx Updated: 04/01/21 1904    Narrative:      The following orders were created for panel order COVID PRE-OP / PRE-PROCEDURE SCREENING ORDER (NO ISOLATION) - Swab, Nasopharynx.  Procedure                               Abnormality         Status                     ---------                               -----------         ------                     COVID-19,[facility]...[[id number]]  Normal              Final result                 Please view results for these tests on the individual orders.    COVID-19,[facility] CEPHEID, NP SWAB IN TRANSPORT MEDIA 8-12 HR TAT - Swab, Nasopharynx [[id number]]  (Normal) Collected: 04/01/21 1730    Lab Status: Final result Specimen: Swab from Nasopharynx Updated: 04/01/21 1904     COVID19 Not Detected    Narrative:      Fact sheet for providers: https://www.fda.gov/media/352452/download     Fact sheet for patients: https://www.fda.gov/media/387991/download    Blood Culture - Blood, Arm, Left [[id number]] Collected: 04/01/21 1714    Lab Status: Preliminary result Specimen: Blood from Arm, Left Updated: 04/04/21 1730     Blood Culture No growth at 3 days      Procalcitonin 0.34      Assessment:    Uncontrolled diabetes mellitus with hyperglycemia (CMS/HCC)    Gastroparesis    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  (CMS/HCC)    Intractable vomiting with nausea    BRICE (obstructive sleep apnea)    Dehydration    Elevated procalcitonin    Hyperglycemia due to type 1 diabetes mellitus (CMS/HCC)  Candida esophagitis    Recommendations/discussion:  Continue supportive care  Continue with treatment for Candida esophagitis  Management of hydration blood sugar control per primary team.  No further work-up or evaluation or treatment needed for elevated procalcitonin.    Scottie Nogueira MD  4/4/2021  21:44 EDT    Much of this encounter note is an electronic transcription/translation of spoken language to printed text. The electronic translation of spoken language may permit erroneous, or at times, nonsensical words or phrases to be inadvertently transcribed; Although I have reviewed the note for such errors, some may still exist

## 2021-04-05 NOTE — PROGRESS NOTES
Infectious Diseases Progress Note    Scottie Nogueira MD     [facility]  [place]: 1 day  Patient Identification:  Name: Silvia Zabala  Age: 60 y.o.  Sex: female  :  1960  MRN: [id number]         Primary Care Physician: Kulwant Martínez APRN            Subjective: Complaining of being weak and having issues with bowel movement.  Feels like she has triplets in her belly that needs to be dealt with.  Denies any specific pain but more of distended bloated feeling.  Denies any hematemesis or melena denies any fever and chills.  Interval History: See admission history and physical done as cross cover for primary medical team.    Objective:    Scheduled Meds:cholestyramine light, 8 g, Oral, BID  docusate sodium, 100 mg, Oral, Daily  ferrous sulfate, 325 mg, Oral, Daily With Breakfast  fluconazole, 100 mg, Oral, Q24H  FLUoxetine, 10 mg, Oral, Daily  furosemide, 80 mg, Oral, Daily  hydrOXYzine, 50 mg, Oral, Nightly  insulin glargine, 50 Units, Subcutaneous, BID  insulin lispro, 0-9 Units, Subcutaneous, TID AC  insulin lispro, 40 Units, Subcutaneous, TID AC  levETIRAcetam, 500 mg, Oral, BID  magnesium oxide, 400 mg, Oral, Daily  metoclopramide, 10 mg, Oral, 4x Daily AC & at Bedtime  mirtazapine, 15 mg, Oral, Nightly  multivitamin with minerals, 1 tablet, Oral, Daily  pantoprazole, 40 mg, Oral, QAM  pregabalin, 75 mg, Oral, Q12H  sodium chloride, 10 mL, Intravenous, Q12H  spironolactone, 100 mg, Oral, Daily  sucralfate, 1 g, Oral, 4x Daily AC & at Bedtime  vitamin B-12, 1,000 mcg, Oral, Daily  vitamin D, 50,000 Units, Oral, Q7 Days      Continuous Infusions:sodium chloride, 30 mL/hr, Last Rate: Stopped (21 1058)        Vital signs in last 24 hours:  Temp:  [98 °F (36.7 °C)-98.6 °F (37 °C)] 98.6 °F (37 °C)  Heart Rate:  [] 104  Resp:  [16] 16  BP: (137-154)/(64-74) 146/72    Intake/Output:    Intake/Output Summary (Last 24 hours) at 2021 0958  Last data filed at 2021 0600  Gross per 24 hour  "  Intake 2115 ml   Output 3350 ml   Net -1235 ml       Exam:  /72 (BP Location: Right arm, Patient Position: Lying)   Pulse 104   Temp 98.6 °F (37 °C) (Oral)   Resp 16   Ht 170.2 cm (67\")   Wt 83.4 kg (183 lb 14.4 oz)   LMP  (LMP Unknown)   SpO2 97%   BMI 28.80 kg/m²   Patient is examined using the personal protective equipment as per guidelines from infection control for this particular patient as enacted.  Hand washing was performed before and after patient interaction.  General Appearance:    Alert, cooperative, no distress, AAOx3 and appears to have a better color in appearance compared to 24 hours ago.                          Head:    Normocephalic, without obvious abnormality, atraumatic                           Eyes:    PERRL, conjunctivae/corneas clear, EOM's intact, both eyes                         Throat:   Lips, tongue, gums normal; oral mucosa pink and moist                           Neck:   Supple, symmetrical, trachea midline, no JVD                         Lungs:    Clear to auscultation bilaterally, respirations unlabored                 Chest Wall:    No tenderness or deformity                          Heart:    Regular rate and rhythm, S1 and S2 normal                  Abdomen:     Soft, non-tender, bowel sounds active, distended, no guarding rigidity or rebound noted                 Extremities:   Extremities normal, atraumatic, no cyanosis or edema                        Pulses:   Pulses palpable in all extremities                            Skin:   Skin is warm and dry,  no rashes or palpable lesions                  Neurologic:   CNII-XII intact, motor strength grossly intact     Data Review:    I reviewed the patient's new clinical results.  Results from last 7 days   Lab Units 04/02/21  0525 04/01/21  1714   WBC 10*3/mm3 4.08 4.71   HEMOGLOBIN g/dL 11.2* 13.3   PLATELETS 10*3/mm3 95* 107*     Results from last 7 days   Lab Units 04/05/21  0501 04/02/21  0525 04/01/21  1714 "   SODIUM mmol/L 138 132* 130*   POTASSIUM mmol/L 4.3 4.3 4.1   CHLORIDE mmol/L 109* 101 94*   CO2 mmol/L 21.8* 21.4* 21.6*   BUN mg/dL 12 20 23*   CREATININE mg/dL 0.74 0.82 1.05*   CALCIUM mg/dL 8.2* 8.5* 9.6   GLUCOSE mg/dL 143* 367* 606*     Microbiology Results (last 10 days)     Procedure Component Value - Date/Time    KOH Prep - Brushing, Esophagus [[id number]] Collected: 04/03/21 1012    Lab Status: Final result Specimen: Brushing from Esophagus Updated: 04/03/21 1307     KOH Prep No yeast or hyphal elements seen    Blood Culture - Blood, Arm, Right [[id number]] Collected: 04/01/21 1735    Lab Status: Preliminary result Specimen: Blood from Arm, Right Updated: 04/04/21 1800     Blood Culture No growth at 3 days    COVID PRE-OP / PRE-PROCEDURE SCREENING ORDER (NO ISOLATION) - Swab, Nasopharynx [[id number]]  (Normal) Collected: 04/01/21 1730    Lab Status: Final result Specimen: Swab from Nasopharynx Updated: 04/01/21 1904    Narrative:      The following orders were created for panel order COVID PRE-OP / PRE-PROCEDURE SCREENING ORDER (NO ISOLATION) - Swab, Nasopharynx.  Procedure                               Abnormality         Status                     ---------                               -----------         ------                     COVID-19,[facility]...[[id number]]  Normal              Final result                 Please view results for these tests on the individual orders.    COVID-19,[facility] CEPHEID, NP SWAB IN TRANSPORT MEDIA 8-12 HR TAT - Swab, Nasopharynx [[id number]]  (Normal) Collected: 04/01/21 1730    Lab Status: Final result Specimen: Swab from Nasopharynx Updated: 04/01/21 1904     COVID19 Not Detected    Narrative:      Fact sheet for providers: https://www.fda.gov/media/108231/download     Fact sheet for patients: https://www.fda.gov/media/833674/download    Blood Culture - Blood, Arm, Left [[id number]] Collected: 04/01/21 1714    Lab Status: Preliminary result Specimen: Blood  from Arm, Left Updated: 04/04/21 6150     Blood Culture No growth at 3 days      Procalcitonin 0.34      Assessment:    Uncontrolled diabetes mellitus with hyperglycemia (CMS/HCC)    Gastroparesis    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 (CMS/HCC)    Intractable vomiting with nausea    BRICE (obstructive sleep apnea)    Dehydration    Elevated procalcitonin    Hyperglycemia due to type 1 diabetes mellitus (CMS/HCC)  Candida esophagitis    Recommendations/discussion:  Continue supportive care and expectant management.  Abdominal distention could very well be due to worsening ascites in this context of withholding of Lasix and IV fluid administration when she came in with severe dehydration.  Plans for paracentesis conveyed to me by the patient.  We will follow up on the fluid cell count and differential results to see if there is any element of SBP in the situation though clinically she does not appear to have this possibility.    Scottie Nogueira MD  4/5/2021  09:58 EDT    Much of this encounter note is an electronic transcription/translation of spoken language to printed text. The electronic translation of spoken language may permit erroneous, or at times, nonsensical words or phrases to be inadvertently transcribed; Although I have reviewed the note for such errors, some may still exist

## 2021-04-06 ENCOUNTER — READMISSION MANAGEMENT (OUTPATIENT)
Dept: CALL CENTER | Facility: HOSPITAL | Age: 61
End: 2021-04-06

## 2021-04-06 VITALS
HEART RATE: 96 BPM | SYSTOLIC BLOOD PRESSURE: 142 MMHG | HEIGHT: 67 IN | BODY MASS INDEX: 29.63 KG/M2 | TEMPERATURE: 97.9 F | DIASTOLIC BLOOD PRESSURE: 66 MMHG | WEIGHT: 188.8 LBS | OXYGEN SATURATION: 94 % | RESPIRATION RATE: 18 BRPM

## 2021-04-06 PROBLEM — R11.2 INTRACTABLE VOMITING WITH NAUSEA: Status: RESOLVED | Noted: 2018-10-03 | Resolved: 2021-04-06

## 2021-04-06 PROBLEM — R79.89 ELEVATED PROCALCITONIN: Status: RESOLVED | Noted: 2021-04-02 | Resolved: 2021-04-06

## 2021-04-06 PROBLEM — E86.0 DEHYDRATION: Status: RESOLVED | Noted: 2019-01-17 | Resolved: 2021-04-06

## 2021-04-06 LAB
BACTERIA SPEC AEROBE CULT: NORMAL
BACTERIA SPEC AEROBE CULT: NORMAL
GLUCOSE BLDC GLUCOMTR-MCNC: 336 MG/DL (ref 70–130)
GLUCOSE BLDC GLUCOMTR-MCNC: 361 MG/DL (ref 70–130)
GLUCOSE BLDC GLUCOMTR-MCNC: 413 MG/DL (ref 70–130)
GLUCOSE BLDC GLUCOMTR-MCNC: 417 MG/DL (ref 70–130)

## 2021-04-06 PROCEDURE — 25010000002 PROCHLORPERAZINE 10 MG/2ML SOLUTION: Performed by: HOSPITALIST

## 2021-04-06 PROCEDURE — 63710000001 INSULIN LISPRO (HUMAN) PER 5 UNITS: Performed by: HOSPITALIST

## 2021-04-06 PROCEDURE — 82962 GLUCOSE BLOOD TEST: CPT

## 2021-04-06 PROCEDURE — 92610 EVALUATE SWALLOWING FUNCTION: CPT

## 2021-04-06 PROCEDURE — 63710000001 INSULIN GLARGINE PER 5 UNITS: Performed by: HOSPITALIST

## 2021-04-06 PROCEDURE — 99232 SBSQ HOSP IP/OBS MODERATE 35: CPT | Performed by: INTERNAL MEDICINE

## 2021-04-06 PROCEDURE — 63710000001 INSULIN LISPRO (HUMAN) PER 5 UNITS: Performed by: INTERNAL MEDICINE

## 2021-04-06 RX ORDER — INSULIN LISPRO 100 [IU]/ML
50 INJECTION, SOLUTION INTRAVENOUS; SUBCUTANEOUS
Refills: 12
Start: 2021-04-06 | End: 2021-04-22 | Stop reason: SDUPTHER

## 2021-04-06 RX ORDER — FLUCONAZOLE 200 MG/1
200 TABLET ORAL EVERY 24 HOURS
Qty: 11 TABLET | Refills: 0 | Status: SHIPPED | OUTPATIENT
Start: 2021-04-06 | End: 2021-04-17

## 2021-04-06 RX ORDER — PROCHLORPERAZINE EDISYLATE 5 MG/ML
10 INJECTION INTRAMUSCULAR; INTRAVENOUS ONCE
Status: COMPLETED | OUTPATIENT
Start: 2021-04-06 | End: 2021-04-06

## 2021-04-06 RX ORDER — INSULIN GLARGINE 100 [IU]/ML
50 INJECTION, SOLUTION SUBCUTANEOUS 2 TIMES DAILY
Refills: 12
Start: 2021-04-06 | End: 2021-04-08

## 2021-04-06 RX ORDER — INSULIN LISPRO 100 [IU]/ML
40 INJECTION, SOLUTION INTRAVENOUS; SUBCUTANEOUS
Refills: 12
Start: 2021-04-06 | End: 2021-04-06

## 2021-04-06 RX ADMIN — METOCLOPRAMIDE 10 MG: 10 TABLET ORAL at 06:47

## 2021-04-06 RX ADMIN — SODIUM CHLORIDE, PRESERVATIVE FREE 10 ML: 5 INJECTION INTRAVENOUS at 10:02

## 2021-04-06 RX ADMIN — PANTOPRAZOLE SODIUM 40 MG: 40 TABLET, DELAYED RELEASE ORAL at 05:52

## 2021-04-06 RX ADMIN — SUCRALFATE 1 G: 1 TABLET ORAL at 06:47

## 2021-04-06 RX ADMIN — DOCUSATE SODIUM 100 MG: 100 CAPSULE, LIQUID FILLED ORAL at 10:00

## 2021-04-06 RX ADMIN — FERROUS SULFATE TAB 325 MG (65 MG ELEMENTAL FE) 325 MG: 325 (65 FE) TAB at 10:01

## 2021-04-06 RX ADMIN — POLYETHYLENE GLYCOL 3350 17 G: 17 POWDER, FOR SOLUTION ORAL at 10:03

## 2021-04-06 RX ADMIN — INSULIN GLARGINE 50 UNITS: 100 INJECTION, SOLUTION SUBCUTANEOUS at 10:06

## 2021-04-06 RX ADMIN — CHOLESTYRAMINE 8 G: 4 POWDER, FOR SUSPENSION ORAL at 05:50

## 2021-04-06 RX ADMIN — INSULIN LISPRO 40 UNITS: 100 INJECTION, SOLUTION INTRAVENOUS; SUBCUTANEOUS at 10:01

## 2021-04-06 RX ADMIN — SUCRALFATE 1 G: 1 TABLET ORAL at 11:26

## 2021-04-06 RX ADMIN — INSULIN LISPRO 40 UNITS: 100 INJECTION, SOLUTION INTRAVENOUS; SUBCUTANEOUS at 14:07

## 2021-04-06 RX ADMIN — LEVETIRACETAM 500 MG: 500 TABLET, FILM COATED ORAL at 10:00

## 2021-04-06 RX ADMIN — Medication 1 TABLET: at 10:01

## 2021-04-06 RX ADMIN — MAGNESIUM OXIDE 400 MG (241.3 MG MAGNESIUM) TABLET 400 MG: TABLET at 10:00

## 2021-04-06 RX ADMIN — METOCLOPRAMIDE 10 MG: 10 TABLET ORAL at 11:26

## 2021-04-06 RX ADMIN — PROCHLORPERAZINE EDISYLATE 10 MG: 5 INJECTION INTRAMUSCULAR; INTRAVENOUS at 13:43

## 2021-04-06 RX ADMIN — OXYCODONE 7.5 MG: 5 TABLET ORAL at 02:56

## 2021-04-06 RX ADMIN — FUROSEMIDE 80 MG: 80 TABLET ORAL at 10:00

## 2021-04-06 RX ADMIN — PREGABALIN 75 MG: 75 CAPSULE ORAL at 10:00

## 2021-04-06 RX ADMIN — SPIRONOLACTONE 100 MG: 100 TABLET, FILM COATED ORAL at 10:01

## 2021-04-06 RX ADMIN — LUBIPROSTONE 24 MCG: 24 CAPSULE, GELATIN COATED ORAL at 10:00

## 2021-04-06 RX ADMIN — OXYCODONE 7.5 MG: 5 TABLET ORAL at 12:16

## 2021-04-06 RX ADMIN — Medication 1000 MCG: at 10:00

## 2021-04-06 RX ADMIN — INSULIN LISPRO 8 UNITS: 100 INJECTION, SOLUTION INTRAVENOUS; SUBCUTANEOUS at 06:52

## 2021-04-06 RX ADMIN — FLUOXETINE HYDROCHLORIDE 10 MG: 10 CAPSULE ORAL at 10:00

## 2021-04-06 NOTE — THERAPY EVALUATION
Acute Care - Speech Language Pathology   Swallow Initial Evaluation [facility]     Patient Name: Silvia Zabala  : 1960  MRN: [id number]  Today's Date: 2021               Admit Date: 2021    Visit Dx:     ICD-10-CM ICD-9-CM   1. Hyperglycemia due to type 1 diabetes mellitus (CMS/HCC)  E10.65 250.01   2. Non-intractable vomiting with nausea, unspecified vomiting type  R11.2 787.01   3. Thrombocytopenia (CMS/HCC)  D69.6 287.5   4. Hyperbilirubinemia  E80.6 782.4   5. 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 Gastroparesis  K31.84 536.3   7. Nausea & vomiting  R11.2 787.01     Patient Active Problem List   Diagnosis   • Cirrhosis of liver (CMS/HCC)   • Rheumatoid arthritis (CMS/HCC)   • Anxiety and depression   • Type 2 diabetes mellitus, uncontrolled, with neuropathy (CMS/HCC)   • Fibrositis   • Change in blood platelet count   • Pancytopenia (CMS/HCC)   • Hypersplenism   • Nausea & vomiting   • DUKES (nonalcoholic steatohepatitis)   • Hyperlipidemia   • Abdominal pain   • Hematemesis   • Ascites   • Portal hypertension (CMS/HCC)   • Systemic lupus (CMS/HCC)   • Secondary esophageal varices without bleeding (CMS/HCC)   • Esophageal varices with bleeding (CMS/HCC)   • Gastroparesis   • 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 (CMS/HCC)   • Diabetic ketoacidosis without coma associated with type 2 diabetes mellitus (CMS/HCC)   • Diabetic peripheral neuropathy (CMS/HCC)   • History of seizure disorder   • Hepatic encephalopathy (CMS/HCC)   • Thrombocytopenia (CMS/HCC)   • Fever   • Acute ITP (CMS/HCC)   • Degeneration of lumbosacral intervertebral disc   • Seizure (CMS/HCC)   • Spondylosis without myelopathy   • UGI bleed   • Migraine without aura   • Urinary retention   • Uncontrolled diabetes mellitus with hyperglycemia (CMS/HCC)   • BRICE (obstructive sleep apnea)   • Gastroparesis   • Hyperammonemia (CMS/HCC)   • Hyponatremia   • Anemia   • Vitamin D insufficiency   •  Hyperglycemia   • Iron deficiency anemia   • Adverse effect of iron or its compound, subsequent encounter   • Hemorrhage of anus and rectum   • Vulvar lesion   • Acute upper GI bleed   • Acute blood loss anemia   • Acute hyperkalemia   • Labial cyst   • Transaminitis   • Hyperbilirubinemia   • Acute gastritis   • UTI (urinary tract infection), bacterial   • UTI (urinary tract infection)   • Cholestatic pruritus   • Fatty liver disease, nonalcoholic   • Protein-calorie malnutrition, moderate (CMS/HCC)   • Hyperglycemia due to type 1 diabetes mellitus (CMS/HCC)   • Ascites     Past Medical History:   Diagnosis Date   • Anemia    • Anxiety    • Arthritis    • Broken shoulder     left shoulder    • Chromosomal abnormality     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   • Cirrhosis (CMS/HCC)    • Clostridium difficile infection    • Colon polyps    • Depression    • Diabetes mellitus (CMS/HCC)     Adult onset, insulin requiring   • Duodenal ulcer with hemorrhage    • Esophageal varices determined by endoscopy (CMS/HCC)    • Fibromyalgia    • Gallbladder disease    • Gastric varices    • Gastroparesis    • Glaucoma    • Granuloma annulare    • H/O B12 deficiency    • H/O Bleeding ulcer     And gastroesophageal varices   • H/O mixed connective tissue disorder    • Hemorrhoids    • Hiatal hernia    • History of transfusion     needs bendryl  flushes sensation and temp goes up   • Hx of being hospitalized     In [place] for GI bleeding from ulcer and varices   • Hyperlipidemia    • Migraine    • Mitral valve prolapse    • DUKES (nonalcoholic steatohepatitis) 11/2016   • Orthostatic hypotension 02/2019   • BRICE (obstructive sleep apnea)    • Pancreas divisum    • Pancytopenia (CMS/HCC)     Chronic, due to cirrhosis and hypersplenism   • Peptic ulcer disease     And esophageal varices   • PONV (postoperative nausea and vomiting)    • RA (rheumatoid  arthritis) (CMS/HCC)    • Seizure disorder (CMS/HCC)     last 2003   • Seizures (CMS/HCC)    • Systemic lupus (CMS/HCC)    • TIA (transient ischemic attack) 1984     Past Surgical History:   Procedure Laterality Date   • BARTHOLIN CYST MARSUPIALIZATION Left 1/18/2021    Procedure: EXCISION OF LABIAL CYST;  Surgeon: Melinda Thakkar MD;  Location: [facility] MAIN OR;  Service: Obstetrics/Gynecology;  Laterality: Left;   • BLADDER REPAIR  1991   • CATARACT EXTRACTION     • CHOLECYSTECTOMY  1994   • COLONOSCOPY     • ENDOSCOPY N/A 11/7/2016    Procedure: ESOPHAGOGASTRODUODENOSCOPY WITH COLD POLYPECTOMY, BANDING,  AND CLIPS TIMES 2;  Surgeon: Rich Coleman MD;  Location: [facility] ENDOSCOPY;  Service:    • ENDOSCOPY N/A 12/22/2016    Procedure: ESOPHAGOGASTRODUODENOSCOPY WITH ESOPHAGEAL BANDING. 5 BANDS FIRED, 4 BANDS ADHERERD TO MUCOSA;  Surgeon: Rich Coleman MD;  Location: [facility] ENDOSCOPY;  Service:    • ENDOSCOPY N/A 2/15/2017    Procedure: ESOPHAGOGASTRODUODENOSCOPY AT BEDSIDE with esophageal varices banding (3);  Surgeon: Rich Coleman MD;  Location: [facility] ENDOSCOPY;  Service:    • ENDOSCOPY N/A 4/6/2017    Procedure: ESOPHAGOGASTRODUODENOSCOPY WITH ESOPHAGEAL VARICES BANDING x2;  Surgeon: Rich Coleman MD;  Location: [facility] ENDOSCOPY;  Service:    • ENDOSCOPY N/A 11/24/2017    Procedure: ESOPHAGOGASTRODUODENOSCOPY with biopsies;  Surgeon: Rich Coleman MD;  Location: [facility] ENDOSCOPY;  Service:    • ENDOSCOPY N/A 10/5/2018    Procedure: ESOPHAGOGASTRODUODENOSCOPY;  Surgeon: Rich Coleman MD;  Location: [facility] ENDOSCOPY;  Service: Gastroenterology   • ENDOSCOPY N/A 5/10/2019    Procedure: ESOPHAGOGASTRODUODENOSCOPY AT BEDSIDE WITH VARICEAL LIGATION;  Surgeon: Rich Coleman MD;  Location: [facility] ENDOSCOPY;  Service: Gastroenterology   • ENDOSCOPY N/A 6/28/2019    Procedure: ESOPHAGOGASTRODUODENOSCOPY WITH ESOPHAGEAL BANDING (3 BANDS);  Surgeon: Rich Coleman MD;  Location: [facility] ENDOSCOPY;  " Service: Gastroenterology   • ENDOSCOPY N/A 4/3/2021    Procedure: ESOPHAGOGASTRODUODENOSCOPY WITH COLD BIOPSIES & BRUSHINGS;  Surgeon: Endy Vasquez MD;  Location: [facility] ENDOSCOPY;  Service: Gastroenterology;  Laterality: N/A;  PRE- N,V & DYSPHAGIA  POST- GASTRITIS, POSSIBLE FUNGAL ESOPHAGITIS   • ENDOSCOPY AND COLONOSCOPY  2014    Dr. Rich Coleman with findings of candida esophagitis   • EYE SURGERY     • HYSTERECTOMY  1986    partial   • JOINT REPLACEMENT     • KNEE SURGERY Right 1995    \"right knee recontstruction\"   • LIVER BIOPSY  01/2015   • MASS EXCISION     • STOMACH SURGERY     • TIPS PROCEDURE  2020    x2        SWALLOW EVALUATION (last 72 hours)      SLP Adult Swallow Evaluation     Row Name 04/06/21 1416                   Rehab Evaluation    Document Type  evaluation  -OC        Subjective Information  no complaints  -OC        Patient Observations  alert;cooperative;agree to therapy  -OC        Care Plan Review  evaluation/treatment results reviewed  -OC        Patient Effort  good  -OC        Symptoms Noted During/After Treatment  none  -OC           General Information    Patient Profile Reviewed  yes  -OC        Pertinent History Of Current Problem  Patient admitted with gastroparesis, DUKES, diatbetes mellitus with hyperglycemia, nausea and vomiting. Esophagram 02/2021 no mention aspiration, cervical web C5.   -OC        Current Method of Nutrition  regular textures;thin liquids  -OC        Precautions/Limitations, Vision  WFL;for purposes of eval  -OC        Precautions/Limitations, Hearing  WFL;for purposes of eval  -OC        Prior Level of Function-Communication  WFL  -OC        Prior Level of Function-Swallowing  esophageal concerns  -OC        Plans/Goals Discussed with  patient;agreed upon  -OC        Barriers to Rehab  none identified  -OC        Patient's Goals for Discharge  patient did not state  -OC           Pain    Additional Documentation  -- reports nausea  -OC           Pain " Scale: Numbers Pre/Post-Treatment    Pretreatment Pain Rating  0/10 - no pain  -OC        Posttreatment Pain Rating  0/10 - no pain  -OC           Oral Motor Structure and Function    Dentition Assessment  edentulous;dentures are ill fitting;other (see comments) reports needing implants  -OC        Secretion Management  WNL/WFL  -OC        Mucosal Quality  moist, healthy  -OC        Volitional Swallow  WFL  -OC        Volitional Cough  WFL  -OC           Oral Musculature and Cranial Nerve Assessment    Oral Motor General Assessment  WFL  -OC        Oral Motor, Comment  speech changes secondry to edentulous  -OC           Clinical Swallow Eval    Clinical Swallow Evaluation Summary  Patient reports esophageal concerns and gastroparesis. Patient demonstrated functional oropharyngeal swallow. No overt s/s aspiration with ice chips, thin, puree, mechanical soft, and regular textures. Reports difficulty with mastication, but able to self monitor textures.   -OC           Clinical Impression    SLP Swallowing Diagnosis  functional oral phase;functional pharyngeal phase;esophageal dysphagia  -OC        Functional Impact  no impact on function  -OC        Rehab Potential/Prognosis, Swallowing  good, to achieve stated therapy goals  -OC        Swallow Criteria for Skilled Therapeutic Interventions Met  no problems identified which require skilled intervention;baseline status  -OC           Recommendations    Therapy Frequency (Swallow)  evaluation only  -OC        Predicted Duration Therapy Intervention (Days)  until discharge  -OC        SLP Diet Recommendation  thin liquids;regular textures  -OC        Recommended Precautions and Strategies  upright posture during/after eating;small bites of food and sips of liquid;reflux precautions;general aspiration precautions  -OC        Oral Care Recommendations  Oral Care BID/PRN  -OC        SLP Rec. for Method of Medication Administration  meds whole;with thin liquids;with pudding  or applesauce;as tolerated  -OC        Monitor for Signs of Aspiration  yes;notify SLP if any concerns  -OC        Anticipated Discharge Disposition (SLP)  home with assist  -OC          User Key  (r) = Recorded By, (t) = Taken By, (c) = Cosigned By    Initials Name Effective Dates    OC Patty Martin MA,[facility]-SLP 06/08/18 -           EDUCATION  The patient has been educated in the following areas:   Dysphagia (Swallowing Impairment).    SLP Recommendation and Plan  SLP Swallowing Diagnosis: functional oral phase, functional pharyngeal phase, esophageal dysphagia  SLP Diet Recommendation: thin liquids, regular textures  Recommended Precautions and Strategies: upright posture during/after eating, small bites of food and sips of liquid, reflux precautions, general aspiration precautions  SLP Rec. for Method of Medication Administration: meds whole, with thin liquids, with pudding or applesauce, as tolerated     Monitor for Signs of Aspiration: yes, notify SLP if any concerns     Swallow Criteria for Skilled Therapeutic Interventions Met: no problems identified which require skilled intervention, baseline status  Anticipated Discharge Disposition (SLP): home with assist  Rehab Potential/Prognosis, Swallowing: good, to achieve stated therapy goals  Therapy Frequency (Swallow): evaluation only  Predicted Duration Therapy Intervention (Days): until discharge                         Plan of Care Reviewed With: patient  Outcome Summary: Clinical swallow eval completed. Patient presents with functional oropharyngeal swallow. Recommend regular and thins, meds whole with thin/puree as tolerated. Recommend upright, slow rate, and reflux precautions. SLP to s/o, patient only complaints are esophageal symptoms.         SLP Outcome Measures (last 72 hours)      SLP Outcome Measures     Row Name 04/06/21 9794             SLP Outcome Measures    Outcome Measure Used?  Adult NOMS  -OC         Adult FCM Scores    FCM Chosen  Swallowing   -OC      Swallowing FCM Score  7  -OC        User Key  (r) = Recorded By, (t) = Taken By, (c) = Cosigned By    Initials Name Effective Dates    Patty Ching MA,MACEY-SLP 06/08/18 -            Time Calculation:   Time Calculation- SLP     Row Name 04/06/21 1444             Time Calculation- SLP    SLP Start Time  1330  -OC      SLP Received On  04/06/21  -OC        User Key  (r) = Recorded By, (t) = Taken By, (c) = Cosigned By    Initials Name Provider Type    Patty Ching MA,MACEY-SLP Speech and Language Pathologist          Therapy Charges for Today     Code Description Service Date Service Provider Modifiers Qty    [id number] HC ST EVAL ORAL PHARYNG SWALLOW 3 4/6/2021 Patty Martin MA,MACEY-SLP GN 1               Patty Martin MA, CCC-SLP  4/6/2021

## 2021-04-06 NOTE — PROGRESS NOTES
[facility] ([facility]  Inpatient Progress Note    Reason for Follow Up: Constipation, dysphagia, gastroparesis, Wong    Subjective     Interval History:   Had a bowel movement x3 the last 24 hours with laxative    Current Facility-Administered Medications:   •  albumin human 25 % IV SOLN 12.5 g, 12.5 g, Intravenous, Once, Dontae Brower MD  •  ALPRAZolam (XANAX) tablet 0.5 mg, 0.5 mg, Oral, BID PRN, Endy Vasquez MD, 0.5 mg at 04/05/21 2352  •  cholestyramine light packet 8 g, 8 g, Oral, BID, Endy Vasquez MD, 8 g at 04/06/21 0550  •  dextrose (D50W) 25 g/ 50mL Intravenous Solution 25 g, 25 g, Intravenous, Q15 Min PRN, Endy Vasquez MD  •  dextrose (GLUTOSE) oral gel 15 g, 15 g, Oral, Q15 Min PRN, Endy Vasquez MD  •  docusate sodium (COLACE) capsule 100 mg, 100 mg, Oral, Daily, Endy Vasquez MD, 100 mg at 04/05/21 0825  •  ferrous sulfate tablet 325 mg, 325 mg, Oral, Daily With Breakfast, Endy Vasquez MD, 325 mg at 04/05/21 0826  •  fluconazole (DIFLUCAN) tablet 200 mg, 200 mg, Oral, Q24H, Katheryn Barrett APRN, 200 mg at 04/05/21 1735  •  FLUoxetine (PROzac) capsule 10 mg, 10 mg, Oral, Daily, Endy Vasquez MD, 10 mg at 04/05/21 0826  •  furosemide (LASIX) tablet 80 mg, 80 mg, Oral, Daily, Dontae Brower MD, 80 mg at 04/05/21 0826  •  glucagon (human recombinant) (GLUCAGEN DIAGNOSTIC) injection 1 mg, 1 mg, Subcutaneous, Q15 Min PRN, Endy Vasquez MD  •  hydrOXYzine (ATARAX) tablet 50 mg, 50 mg, Oral, Nightly, Endy Vasquez MD, 50 mg at 04/05/21 2109  •  insulin glargine (LANTUS, SEMGLEE) injection 50 Units, 50 Units, Subcutaneous, BID, Dontae Brower MD, 50 Units at 04/05/21 2110  •  insulin lispro (ADMELOG) injection 0-9 Units, 0-9 Units, Subcutaneous, TID Christina JAMISON Kevin, MD, 8 Units at 04/06/21 0652  •  insulin lispro (ADMELOG) injection 40 Units, 40 Units, Subcutaneous, TID Leonarda JAMISON Minh Loc, MD, 40 Units at 04/05/21 1736  •  levETIRAcetam (KEPPRA) tablet 500 mg, 500 mg, Oral,  BID, Endy Vasquez MD, 500 mg at 04/05/21 2110  •  lidocaine (XYLOCAINE) 1 % injection 10 mL, 10 mL, Intradermal, Once, Rich Mendoza MD  •  lubiprostone (AMITIZA) capsule 24 mcg, 24 mcg, Oral, BID With Meals, Katheryn Barrett APRN, 24 mcg at 04/05/21 1736  •  magnesium oxide (MAG-OX) tablet 400 mg, 400 mg, Oral, Daily, Endy Vasquez MD, 400 mg at 04/05/21 0826  •  metoclopramide (REGLAN) tablet 10 mg, 10 mg, Oral, 4x Daily AC & at Bedtime, Endy Vasquez MD, 10 mg at 04/06/21 0647  •  mirtazapine (REMERON) tablet 15 mg, 15 mg, Oral, Nightly, Endy Vasquez MD, 15 mg at 04/05/21 2110  •  multivitamin with minerals 1 tablet, 1 tablet, Oral, Daily, Endy Vasquez MD, 1 tablet at 04/05/21 0826  •  nitroglycerin (NITROSTAT) SL tablet 0.4 mg, 0.4 mg, Sublingual, Q5 Min PRN, Endy Vasquez MD  •  oxyCODONE (ROXICODONE) immediate release tablet 7.5 mg, 7.5 mg, Oral, Q8H PRN, Endy Vasquez MD, 7.5 mg at 04/06/21 0256  •  pantoprazole (PROTONIX) EC tablet 40 mg, 40 mg, Oral, QAM, Endy Vasquez MD, 40 mg at 04/06/21 0552  •  polyethylene glycol (MIRALAX) packet 17 g, 17 g, Oral, Daily, Katheryn Barrett APRN, 17 g at 04/05/21 1143  •  pregabalin (LYRICA) capsule 75 mg, 75 mg, Oral, Q12H, Endy Vasquez MD, 75 mg at 04/05/21 2109  •  [COMPLETED] Insert peripheral IV, , , Once **AND** sodium chloride 0.9 % flush 10 mL, 10 mL, Intravenous, PRN, Endy Vasquez MD  •  sodium chloride 0.9 % flush 10 mL, 10 mL, Intravenous, Q12H, Endy Vasquez MD, 10 mL at 04/05/21 2111  •  sodium chloride 0.9 % flush 10 mL, 10 mL, Intravenous, PRN, Endy Vasquez MD  •  sodium chloride 0.9 % infusion, 30 mL/hr, Intravenous, Continuous PRN, Endy Vasquez MD, Stopped at 04/03/21 1058  •  spironolactone (ALDACTONE) tablet 100 mg, 100 mg, Oral, Daily, Dontae Brower MD, 100 mg at 04/05/21 0826  •  sucralfate (CARAFATE) tablet 1 g, 1 g, Oral, 4x Daily AC & at Bedtime, Endy Vasquez MD, 1 g at 04/06/21 0647  •  vitamin B-12 (CYANOCOBALAMIN)  tablet 1,000 mcg, 1,000 mcg, Oral, Daily, Endy Vasquez MD, 1,000 mcg at 04/05/21 0826  •  vitamin D (ERGOCALCIFEROL) capsule 50,000 Units, 50,000 Units, Oral, Q7 Days, Endy Vasquez MD, 50,000 Units at 04/02/21 0835  Review of Systems:    There is weakness fatigue all other systems reviewed and negative    Objective     Vital Signs  Temp:  [97.8 °F (36.6 °C)-98.6 °F (37 °C)] 98.4 °F (36.9 °C)  Heart Rate:  [] 96  Resp:  [16-18] 18  BP: (131-159)/(66-74) 139/66  Body mass index is 29.57 kg/m².    Intake/Output Summary (Last 24 hours) at 4/6/2021 0903  Last data filed at 4/6/2021 0500  Gross per 24 hour   Intake --   Output 2850 ml   Net -2850 ml     No intake/output data recorded.     Physical Exam:   General: patient awake, alert and cooperative   Eyes: Normal lids and lashes, no scleral icterus   Neck: supple, normal ROM   Skin: warm and dry, not jaundiced   Cardiovascular: regular rhythm and rate, no murmurs auscultated   Pulm: clear to auscultation bilaterally, regular and unlabored   Abdomen: soft, nontender, nondistended; normal bowel sounds   Extremities: no rash or edema   Psychiatric: Normal mood and behavior; memory intact     Results Review:     I reviewed the patient's new clinical results.    Results from last 7 days   Lab Units 04/02/21  0525 04/01/21  1714   WBC 10*3/mm3 4.08 4.71   HEMOGLOBIN g/dL 11.2* 13.3   HEMATOCRIT % 32.3* 39.2   PLATELETS 10*3/mm3 95* 107*     Results from last 7 days   Lab Units 04/05/21  0501 04/02/21  0525 04/01/21  1714   SODIUM mmol/L 138 132* 130*   POTASSIUM mmol/L 4.3 4.3 4.1   CHLORIDE mmol/L 109* 101 94*   CO2 mmol/L 21.8* 21.4* 21.6*   BUN mg/dL 12 20 23*   CREATININE mg/dL 0.74 0.82 1.05*   CALCIUM mg/dL 8.2* 8.5* 9.6   BILIRUBIN mg/dL  --  1.4* 2.2*   ALK PHOS U/L  --  141* 180*   ALT (SGPT) U/L  --  23 29   AST (SGOT) U/L  --  24 28   GLUCOSE mg/dL 143* 367* 606*     Results from last 7 days   Lab Units 04/05/21  1034 04/01/21  1714   INR  1.32* 1.33*      Lab Results   Lab Value Date/Time    LIPASE 11 (L) 04/01/2021 1714    LIPASE 14 02/03/2021 1139    LIPASE 8 (L) 05/09/2019 2043    LIPASE 9 (L) 01/17/2019 1259    LIPASE 8 (L) 12/13/2018 1900    LIPASE 12 (L) 10/02/2018 2332    LIPASE 9 (L) 05/18/2018 0425    LIPASE 22 12/16/2017 1650    LIPASE 10 (L) 10/30/2017 1732    LIPASE 9 (L) 02/21/2017 1202    LIPASE 16 11/10/2014 0724       Radiology:  US Paracentesis   Final Result   No ascites present. Paracentesis not performed       This report was finalized on 4/5/2021 1:06 PM by Dr. Rich Mendoza M.D.          XR Abdomen KUB   Final Result      XR Abdomen 2+ VW with Chest 1 VW   Final Result          Assessment/Plan     Patient Active Problem List   Diagnosis   • Cirrhosis of liver (CMS/HCC)   • Rheumatoid arthritis (CMS/HCC)   • Anxiety and depression   • Type 2 diabetes mellitus, uncontrolled, with neuropathy (CMS/HCC)   • Fibrositis   • Change in blood platelet count   • Pancytopenia (CMS/HCC)   • Hypersplenism   • Nausea & vomiting   • DUKES (nonalcoholic steatohepatitis)   • Hyperlipidemia   • Abdominal pain   • Hematemesis   • Ascites   • Portal hypertension (CMS/HCC)   • Systemic lupus (CMS/HCC)   • Secondary esophageal varices without bleeding (CMS/HCC)   • Esophageal varices with bleeding (CMS/HCC)   • Gastroparesis   • 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 (CMS/HCC)   • Diabetic ketoacidosis without coma associated with type 2 diabetes mellitus (CMS/HCC)   • Diabetic peripheral neuropathy (CMS/HCC)   • History of seizure disorder   • Hepatic encephalopathy (CMS/HCC)   • Thrombocytopenia (CMS/HCC)   • Fever   • Acute ITP (CMS/HCC)   • Degeneration of lumbosacral intervertebral disc   • Seizure (CMS/HCC)   • Spondylosis without myelopathy   • UGI bleed   • Migraine without aura   • Urinary retention   • Uncontrolled diabetes mellitus with hyperglycemia (CMS/HCC)   • BRICE (obstructive sleep apnea)   • Gastroparesis   • Hyperammonemia (CMS/HCC)   •  Hyponatremia   • Anemia   • Vitamin D insufficiency   • Hyperglycemia   • Iron deficiency anemia   • Adverse effect of iron or its compound, subsequent encounter   • Hemorrhage of anus and rectum   • Vulvar lesion   • Acute upper GI bleed   • Acute blood loss anemia   • Acute hyperkalemia   • Labial cyst   • Transaminitis   • Hyperbilirubinemia   • Acute gastritis   • UTI (urinary tract infection), bacterial   • UTI (urinary tract infection)   • Cholestatic pruritus   • Fatty liver disease, nonalcoholic   • Protein-calorie malnutrition, moderate (CMS/HCC)   • Hyperglycemia due to type 1 diabetes mellitus (CMS/HCC)   • Ascites       Assessment:  1. Wong cirrhosis   2. Gastroparesis with nausea and vomiting  3. Chronic constipation  4. Portal hypertensive gastropathy noted on recent EGD           Plan:  · No evidence of outlet obstruction on recent EGD, continue metoclopramide and smaller more frequent meals for gastroparesis  · Multiple causes for constipation including chronic idiopathic constipation, delayed transit in a patient with gastroparesis and medication side effect would recommend continuing cholestyramine but using lowest dose possible for control of pruritus as this can cause constipation as discussed with patient and nurse during today's visit.  · For constipation, will add polyethylene glycol 17 g x 1 daily and Amitiza 24 mcg 1 tablet twice daily with food.  Good results noted  · Patient has previously been on Linzess years ago but does not remember the response.  She has been using MiraLAX at home with minimal improvement in symptoms.  Prior use of lactulose causes abdominal cramping and is not well tolerated.    I discussed the patients findings and my recommendations with patient and nursing staff.    Los Knutson MD

## 2021-04-06 NOTE — PROGRESS NOTES
Infectious Diseases Progress Note    Scottie Nogueira MD     [facility]  Los: 2 days  Patient Identification:  Name: Silvia Zabala  Age: 60 y.o.  Sex: female  :  1960  MRN: [id number]         Primary Care Physician: Kulwant Martínez APRN            Subjective: Feeling better  Interval History: See admission history and physical done as cross cover for primary medical team.    Objective:    Scheduled Meds:albumin human, 12.5 g, Intravenous, Once  cholestyramine light, 8 g, Oral, BID  docusate sodium, 100 mg, Oral, Daily  ferrous sulfate, 325 mg, Oral, Daily With Breakfast  fluconazole, 200 mg, Oral, Q24H  FLUoxetine, 10 mg, Oral, Daily  furosemide, 80 mg, Oral, Daily  hydrOXYzine, 50 mg, Oral, Nightly  insulin glargine, 50 Units, Subcutaneous, BID  insulin lispro, 0-9 Units, Subcutaneous, TID AC  insulin lispro, 40 Units, Subcutaneous, TID AC  levETIRAcetam, 500 mg, Oral, BID  lidocaine, 10 mL, Intradermal, Once  lubiprostone, 24 mcg, Oral, BID With Meals  magnesium oxide, 400 mg, Oral, Daily  metoclopramide, 10 mg, Oral, 4x Daily AC & at Bedtime  mirtazapine, 15 mg, Oral, Nightly  multivitamin with minerals, 1 tablet, Oral, Daily  pantoprazole, 40 mg, Oral, QAM  polyethylene glycol, 17 g, Oral, Daily  pregabalin, 75 mg, Oral, Q12H  sodium chloride, 10 mL, Intravenous, Q12H  spironolactone, 100 mg, Oral, Daily  sucralfate, 1 g, Oral, 4x Daily AC & at Bedtime  vitamin B-12, 1,000 mcg, Oral, Daily  vitamin D, 50,000 Units, Oral, Q7 Days      Continuous Infusions:sodium chloride, 30 mL/hr, Last Rate: Stopped (21 1058)        Vital signs in last 24 hours:  Temp:  [97.8 °F (36.6 °C)-98.6 °F (37 °C)] 98.4 °F (36.9 °C)  Heart Rate:  [] 96  Resp:  [16-18] 18  BP: (131-159)/(66-74) 139/66    Intake/Output:    Intake/Output Summary (Last 24 hours) at 2021 0908  Last data filed at 2021 0500  Gross per 24 hour   Intake --   Output 2850 ml   Net -2850 ml       Exam:  /66 (BP Location:  "Left arm, Patient Position: Sitting)   Pulse 96   Temp 98.4 °F (36.9 °C) (Oral)   Resp 18   Ht 170.2 cm (67\")   Wt 85.6 kg (188 lb 12.8 oz)   LMP  (LMP Unknown)   SpO2 94%   BMI 29.57 kg/m²   Patient is examined using the personal protective equipment as per guidelines from infection control for this particular patient as enacted.  Hand washing was performed before and after patient interaction.  General Appearance:    Alert, cooperative, no distress, AAOx3 and appears to have a better color in appearance compared to 24 hours ago.                          Head:    Normocephalic, without obvious abnormality, atraumatic                           Eyes:    PERRL, conjunctivae/corneas clear, EOM's intact, both eyes                         Throat:   Lips, tongue, gums normal; oral mucosa pink and moist                           Neck:   Supple, symmetrical, trachea midline, no JVD                         Lungs:    Clear to auscultation bilaterally, respirations unlabored                 Chest Wall:    No tenderness or deformity                          Heart:    Regular rate and rhythm, S1 and S2 normal                  Abdomen:     Soft, non-tender, bowel sounds active, distended, no guarding rigidity or rebound noted                 Extremities:   Extremities normal, atraumatic, no cyanosis or edema                        Pulses:   Pulses palpable in all extremities                            Skin:   Skin is warm and dry,  no rashes or palpable lesions                  Neurologic:   CNII-XII intact, motor strength grossly intact     Data Review:    I reviewed the patient's new clinical results.  Results from last 7 days   Lab Units 04/02/21  0525 04/01/21  1714   WBC 10*3/mm3 4.08 4.71   HEMOGLOBIN g/dL 11.2* 13.3   PLATELETS 10*3/mm3 95* 107*     Results from last 7 days   Lab Units 04/05/21  0501 04/02/21  0525 04/01/21  1714   SODIUM mmol/L 138 132* 130*   POTASSIUM mmol/L 4.3 4.3 4.1   CHLORIDE mmol/L 109* " 101 94*   CO2 mmol/L 21.8* 21.4* 21.6*   BUN mg/dL 12 20 23*   CREATININE mg/dL 0.74 0.82 1.05*   CALCIUM mg/dL 8.2* 8.5* 9.6   GLUCOSE mg/dL 143* 367* 606*     Microbiology Results (last 10 days)     Procedure Component Value - Date/Time    KOH Prep - Brushing, Esophagus [[id number]] Collected: 04/03/21 1012    Lab Status: Final result Specimen: Brushing from Esophagus Updated: 04/03/21 1307     KOH Prep No yeast or hyphal elements seen    Blood Culture - Blood, Arm, Right [[id number]] Collected: 04/01/21 1735    Lab Status: Preliminary result Specimen: Blood from Arm, Right Updated: 04/05/21 1800     Blood Culture No growth at 4 days    COVID PRE-OP / PRE-PROCEDURE SCREENING ORDER (NO ISOLATION) - Swab, Nasopharynx [[id number]]  (Normal) Collected: 04/01/21 1730    Lab Status: Final result Specimen: Swab from Nasopharynx Updated: 04/01/21 1904    Narrative:      The following orders were created for panel order COVID PRE-OP / PRE-PROCEDURE SCREENING ORDER (NO ISOLATION) - Swab, Nasopharynx.  Procedure                               Abnormality         Status                     ---------                               -----------         ------                     COVID-19,[facility]...[[id number]]  Normal              Final result                 Please view results for these tests on the individual orders.    COVID-19,[facility] CEPHEID, NP SWAB IN TRANSPORT MEDIA 8-12 HR TAT - Swab, Nasopharynx [[id number]]  (Normal) Collected: 04/01/21 1730    Lab Status: Final result Specimen: Swab from Nasopharynx Updated: 04/01/21 1904     COVID19 Not Detected    Narrative:      Fact sheet for providers: https://www.fda.gov/media/711534/download     Fact sheet for patients: https://www.fda.gov/media/016585/download    Blood Culture - Blood, Arm, Left [[id number]] Collected: 04/01/21 1714    Lab Status: Preliminary result Specimen: Blood from Arm, Left Updated: 04/05/21 1730     Blood Culture No growth at 4 days       Procalcitonin 0.34      Assessment:    Uncontrolled diabetes mellitus with hyperglycemia (CMS/HCC)    Gastroparesis    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 (CMS/HCC)    BRICE (obstructive sleep apnea)    Hyperglycemia due to type 1 diabetes mellitus (CMS/HCC)  Candida esophagitis    Recommendations/discussion:  Continue supportive care and expectant management.  Abdominal distention could very well be due to worsening ascites in this context of withholding of Lasix and IV fluid administration when she came in with severe dehydration.    Plans for discharge noted.    Scottie Nogueira MD  4/6/2021  09:08 EDT    Much of this encounter note is an electronic transcription/translation of spoken language to printed text. The electronic translation of spoken language may permit erroneous, or at times, nonsensical words or phrases to be inadvertently transcribed; Although I have reviewed the note for such errors, some may still exist

## 2021-04-06 NOTE — PLAN OF CARE
Goal Outcome Evaluation:  Plan of Care Reviewed With: patient     Outcome Summary: Clinical swallow eval completed. Patient presents with functional oropharyngeal swallow. Recommend regular and thins, meds whole with thin/puree as tolerated. Recommend upright, slow rate, and reflux precautions. SLP to s/o, patient only complaints are esophageal symptoms.    Patient was not wearing a face mask during this therapy encounter. Therapist used appropriate personal protective equipment including mask, eye protection and gloves.  Mask used was [id number]/[id number]. Appropriate PPE was worn during the entire therapy session. Hand hygiene was completed before and after therapy session. Patient is not in enhanced droplet precautions.

## 2021-04-06 NOTE — DISCHARGE SUMMARY
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 (CMS/HCC) [K75.81, K74.60]  Unknown   • Gastroparesis [K31.84]  Yes      Resolved Hospital Problems    Diagnosis Date Resolved POA   • Elevated procalcitonin [R79.89] 04/06/2021 Unknown   • Dehydration [E86.0] 04/06/2021 Yes   • Intractable vomiting with nausea [R11.2] 04/06/2021 Yes     Procedure(s):  ESOPHAGOGASTRODUODENOSCOPY WITH COLD BIOPSIES & BRUSHINGS  04/03 0933 UPPER GI ENDOSCOPY  Consults     Date and Time Order Name Status Description    4/2/2021  1:22 PM Inpatient Infectious Diseases Consult Completed     4/2/2021 11:04 AM Inpatient Gastroenterology Consult Completed     4/1/2021  6:27 PM [facility] (on-call MD unless specified) Details Completed         Hospital Course  Please see history and physical for details. Patient is a 60 y.o. female with poorly controlled diabetes compounded by past history of Dukes cirrhosis was admitted for hyperglycemia with blood sugars in the 400-500 range.   with endocrinology normally manages this in outpatient setting.  She was not in DKA though she was hydrated with IV fluids as well as adjustments to her insulin regimen.  If anything we have decreased her regimen by discharge to avoid hypoglycemic spells.  She dropped to a low of 65.  Her blood sugars over the last couple days have shown better stabilization and are starting to trend back up but we have avoided any further hypoglycemia.  She has scheduled follow-up with her endocrinologist and will await further direction from them in an outpatient setting.  Until then her Lantus has been  decreased to 50 units twice daily and her bolus NovoLog has decreased to 50 units 3 times daily.  Unfortunately she has gastroparesis as a complication of diabetes as well as neuropathy.  GI saw in consultation and performed an EGD per Dr. Guadarrama on 4/3/2021.  He felt Candida was present and started the patient on Diflucan.  KOH washings from that scope are actually unremarkable though you can see Candida-like plaques on the EGD pictures so we will go ahead and complete treatment with Diflucan especially given recent issues with hyperglycemia and uncontrolled diabetes.  Her diuretics have been resumed and initial KUB thought there were concerns for large volume ascites but that was discredited with ultrasound-guided paracentesis as no fluid was present for removal.  Dehydration is resolved at this point and previous issues with nausea and vomiting have also resolved and she is already on scheduled Reglan prior to admission.  She still notes some dysphagia and speech therapy is going to see the patient prior to discharge but I am not sure much more can be done for this.  Hopefully patient's dysphagia improves with further treatment of Candida.  At this juncture patient feels much better clinically and she is amenable to discharge this afternoon after evaluation by speech therapy.  No family was present at bedside during the time of my exam though answered all questions to the patient and she did not asked me to reach out discussed her case further with any additional family.  I feel the patient is medically stable for disposition at this point and she is also amenable to the above.  CCP is help coordinate any discharge needs and patient denies any additional needs.       Condition on Discharge: Improved.     Temp:  [97.8 °F (36.6 °C)-98.6 °F (37 °C)] 98.4 °F (36.9 °C)  Heart Rate:  [] 96  Resp:  [16-18] 18  BP: (131-159)/(66-74) 139/66  Body mass index is 29.57 kg/m².    Physical Exam  HENT:      Head:  Normocephalic.      Nose: Nose normal.      Mouth/Throat:      Mouth: Mucous membranes are moist.      Pharynx: Oropharynx is clear.   Eyes:      General: No scleral icterus.     Conjunctiva/sclera: Conjunctivae normal.   Cardiovascular:      Rate and Rhythm: Normal rate and regular rhythm.   Pulmonary:      Effort: Pulmonary effort is normal.      Breath sounds: Normal breath sounds.   Abdominal:      General: Bowel sounds are normal.      Palpations: Abdomen is soft.      Tenderness: There is no abdominal tenderness. There is no guarding or rebound.   Musculoskeletal:         General: No swelling.   Skin:     Coloration: Skin is not jaundiced.   Neurological:      Mental Status: She is alert and oriented to person, place, and time. Mental status is at baseline.      Cranial Nerves: No cranial nerve deficit.     [unfilled]    Disposition: Home or Self Care       Discharge Medications      New Medications      Instructions Start Date   fluconazole 200 MG tablet  Commonly known as: DIFLUCAN   200 mg, Oral, Every 24 Hours      insulin glargine 100 UNIT/ML injection  Commonly known as: LANTUS, SEMGLEE  Replaces: Insulin Glargine 100 UNIT/ML injection pen   50 Units, Subcutaneous, 2 Times Daily      insulin lispro 100 UNIT/ML injection  Commonly known as: ADMELOG  Replaces: Insulin Lispro (1 Unit Dial) 100 UNIT/ML solution pen-injector   50 Units, Subcutaneous, 3 Times Daily Before Meals         Changes to Medications      Instructions Start Date   cholestyramine light 4 g powder  What changed: how much to take   4 g, Oral, 2 Times Daily      ferrous sulfate 325 (65 FE) MG tablet  What changed: when to take this   TAKE ONE TABLET BY MOUTH TWICE A DAY      FreeStyle Roseline Sensor System  What changed: Another medication with the same name was added. Make sure you understand how and when to take each.   1 Device, Does not apply, Every 14 Days      FreeStyle Roseline 14 Day Sensor misc  What changed: You were already taking a  medication with the same name, and this prescription was added. Make sure you understand how and when to take each.   Use as directed.      spironolactone 100 MG tablet  Commonly known as: ALDACTONE  What changed:   · when to take this  · reasons to take this   TAKE ONE TABLET BY MOUTH DAILY         Continue These Medications      Instructions Start Date   ALPRAZolam 0.5 MG tablet  Commonly known as: XANAX   0.5 mg, Oral, 2 Times Daily PRN      COLACE PO   2 capsules, Oral, Every Night at Bedtime      ergocalciferol 1.25 MG (23718 UT) capsule  Commonly known as: ERGOCALCIFEROL   Vitamin D2 1,250 mcg (50,000 unit) capsule      esomeprazole 40 MG capsule  Commonly known as: nexIUM   40 mg, Oral, Every Morning Before Breakfast      FLUoxetine 10 MG capsule  Commonly known as: PROzac   10 mg, Oral, Daily      furosemide 80 MG tablet  Commonly known as: LASIX   80 mg, Oral, Daily PRN      glucose blood test strip   Other, As Needed      hydrOXYzine 25 MG tablet  Commonly known as: ATARAX   50 mg, Oral, Nightly      Intrarosa 6.5 MG insert  Generic drug: Prasterone   1 each, Vaginal, 2 Times Weekly      levETIRAcetam 500 MG tablet  Commonly known as: KEPPRA   TAKE ONE TABLET BY MOUTH TWICE A DAY      lubiprostone 8 MCG capsule  Commonly known as: Amitiza   8 mcg, Oral, 2 Times Daily With Meals      Magnesium Oxide 400 MG capsule   400 mg, Oral, Nightly      metoclopramide 10 MG tablet  Commonly known as: REGLAN   10 mg, Oral, 4 Times Daily Before Meals & Nightly      MiraLax 17 GM/SCOOP powder  Generic drug: polyethylene glycol   17 g, Oral, Daily PRN      mirtazapine 15 MG tablet  Commonly known as: REMERON   15 mg, Oral, Nightly      multivitamin with minerals tablet tablet   1 tablet, Oral, Daily      Oxaydo 7.5 MG tablet   Generic drug: oxyCODONE HCl   7.5 mg, Oral, Every 8 Hours PRN      pregabalin 75 MG capsule  Commonly known as: LYRICA   75 mg, Oral, 2 Times Daily      promethazine 25 MG tablet  Commonly known as:  PHENERGAN   25 mg, Oral, Every 8 Hours PRN      riFAXIMin 550 MG tablet  Commonly known as: Xifaxan   550 mg, Oral, 2 Times Daily      sucralfate 1 GM/10ML suspension  Commonly known as: CARAFATE   1 g, Oral, 3 Times Daily      vitamin B-12 1000 MCG tablet  Commonly known as: CYANOCOBALAMIN   1,000 mcg, Oral, Daily      Vitamin D3 25 MCG (1000 UT) capsule   1,000 Units, Oral, Daily         Stop These Medications    Insulin Glargine 100 UNIT/ML injection pen  Commonly known as: LANTUS SOLOSTAR  Replaced by: insulin glargine 100 UNIT/ML injection     Insulin Lispro (1 Unit Dial) 100 UNIT/ML solution pen-injector  Commonly known as: HumaLOG KwikPen  Replaced by: insulin lispro 100 UNIT/ML injection             Additional Instructions for the Follow-ups that You Need to Schedule     Discharge Follow-up with PCP   As directed       Currently Documented PCP:    Kulwant Martínez APRN    PCP Phone Number:    [phone]     Follow Up Details: PCP 2 to 3 weeks.  Endocrinology as already scheduled.  GI per their recommendations           Follow-up Information     Kulwant Martínez APRN .    Specialty: Internal Medicine  Why: PCP 2 to 3 weeks.  Endocrinology as already scheduled.  GI per their recommendations  Contact information:  [address] [address]  [phone]                  Pending Labs     Order Current Status    Tissue Pathology Exam In process    Blood Culture - Blood, Arm, Left Preliminary result    Blood Culture - Blood, Arm, Right Preliminary result         Mihai Ayala MD  04/06/21  08:49 EDT    Discharge time spent greater than 30 minutes.

## 2021-04-06 NOTE — PLAN OF CARE
Goal Outcome Evaluation:  Plan of Care Reviewed With: patient  Progress: improving   Medicated for abdominal pain. Medicated for anxiety, with relief. Up with assist to the BSC. Fleet enema administered per orders, with a large BM following the enemas. Pt passing gas now. A&Ox4. Medications administered per orders. VSS. No s/s of distress at this time. Will continue to monitor.

## 2021-04-07 ENCOUNTER — TRANSITIONAL CARE MANAGEMENT TELEPHONE ENCOUNTER (OUTPATIENT)
Dept: CALL CENTER | Facility: HOSPITAL | Age: 61
End: 2021-04-07

## 2021-04-07 LAB
LAB AP CASE REPORT: NORMAL
LAB AP DIAGNOSIS COMMENT: NORMAL
PATH REPORT.ADDENDUM SPEC: NORMAL
PATH REPORT.FINAL DX SPEC: NORMAL
PATH REPORT.GROSS SPEC: NORMAL

## 2021-04-07 NOTE — OUTREACH NOTE
Call Center TCM Note      Responses   [facility] patient discharged from?  [place]   Does the patient have one of the following disease processes/diagnoses(primary or secondary)?  Other   TCM attempt successful?  No   Unsuccessful attempts  Attempt 2          Blanca Mcgee MA    4/7/2021, 17:29 EDT

## 2021-04-07 NOTE — OUTREACH NOTE
Call Center TCM Note      Responses   [facility] [place] patient discharged from?  [place]   Does the patient have one of the following disease processes/diagnoses(primary or secondary)?  Other   TCM attempt successful?  No   Unsuccessful attempts  Attempt 1          Blanca Mcgee MA    4/7/2021, 15:07 EDT

## 2021-04-07 NOTE — PAYOR COMM NOTE
"Silvia Luther (60 y.o. Female)     ATTN: DISCHARGE SUMMARY FOR REVIEW:  [id number]    [facility] DEPT: ELMO ZELAYA RN,[facility] [phone],  [phone]        Date of Birth Social Security Number Address Home Phone MRN    1960  [address]  [facility] [address]  [place] [address] [phone] [id number]    Muslim Marital Status          None        Admission Date Admission Type Admitting Provider Attending Provider Department, Room/Bed    4/1/21 Emergency Scottie Nogueira MD  [address], E655/1    Discharge Date Discharge Disposition Discharge Destination        4/6/2021 Home or Self Care              Attending Provider: (none)   Allergies: Albuterol, Imitrex [Sumatriptan], Tramadol, Nsaids, Tylenol [Acetaminophen], Zofran [Ondansetron Hcl], Lactulose, Quinine Derivatives    Isolation: None   Infection: None   Code Status: Prior    Ht: 170.2 cm (67\")   Wt: 85.6 kg (188 lb 12.8 oz)    Admission Cmt: None   Principal Problem: Uncontrolled diabetes mellitus with hyperglycemia (CMS/[facility]) [E11.65]                 Active Insurance as of 4/1/2021     Primary Coverage     Payor Plan Insurance Group Employer/Plan Group    [facility] BLUE [facility] PPO [id number]     Payor Plan Address Payor Plan Phone Number Payor Plan Fax Number Effective Dates    [address] [phone]  1/1/2017 - None Entered    [facility] [id number]       Subscriber Name Subscriber Birth Date Member ID       RADHA LUTHER 7/[birth year]/1970 [id number]           Secondary Coverage     Payor Plan Insurance Group Employer/Plan Group    MEDICARE MEDICARE A & B      Payor Plan Address Payor Plan Phone Number Payor Plan Fax Number Effective Dates    [address] [phone]  9/1/2003 - None Entered    [facility] [id number]       Subscriber Name Subscriber Birth Date Member ID       SILVIA LUTHER 1960 [id number]                 Emergency Contacts      (Rel.) Home Phone Work " Phone Mobile Phone    Jose Zabala (Spouse) [phone] -- [phone]    Emma Haywood (Daughter) [phone] -- --    Beka Haywood (Brother) -- -- [phone]               Operative/Procedure Notes (all)      Procedures signed by Endy Vasquez MD at 04/03/21 1020   Version 1 of 1     Procedure Orders    1. UPPER GI ENDOSCOPY [[id number]] ordered by Endy Vasquez MD at 04/03/21 0933          Scan on 4/3/2021 0933 by Endy Vasquez MD: EGD          Electronically signed by Endy Vasquez MD at 04/03/21 1020          Discharge Summary      Mihai Ayala MD at 04/06/21 0823                              [facility]    Date of Discharge:  4/6/2021    PCP: Kulwant Martínez APRN    Discharge Diagnosis:   Active Hospital Problems    Diagnosis  POA   • **Uncontrolled diabetes mellitus with hyperglycemia (CMS/HCC) [E11.65]  Yes   • Hyperglycemia due to type 1 diabetes mellitus (CMS/HCC) [E10.65]  Yes   • BRICE (obstructive sleep apnea) [G47.33]  Yes   • Liver cirrhosis secondary to WONG (CMS/HCC) [K75.81, K74.60]  Unknown   • Gastroparesis [K31.84]  Yes      Resolved Hospital Problems    Diagnosis Date Resolved POA   • Elevated procalcitonin [R79.89] 04/06/2021 Unknown   • Dehydration [E86.0] 04/06/2021 Yes   • Intractable vomiting with nausea [R11.2] 04/06/2021 Yes     Procedure(s):  ESOPHAGOGASTRODUODENOSCOPY WITH COLD BIOPSIES & BRUSHINGS  04/03 0933 UPPER GI ENDOSCOPY  Consults     Date and Time Order Name Status Description    4/2/2021  1:22 PM Inpatient Infectious Diseases Consult Completed     4/2/2021 11:04 AM Inpatient Gastroenterology Consult Completed     4/1/2021  6:27 PM [facility] (on-call MD unless specified) Details Completed         Hospital Course  Please see history and physical for details. Patient is a 60 y.o. female with poorly controlled diabetes compounded by past history of Wong cirrhosis was admitted for hyperglycemia with blood sugars in the 400-500 range.    with endocrinology normally manages this in outpatient setting.  She was not in DKA though she was hydrated with IV fluids as well as adjustments to her insulin regimen.  If anything we have decreased her regimen by discharge to avoid hypoglycemic spells.  She dropped to a low of 65.  Her blood sugars over the last couple days have shown better stabilization and are starting to trend back up but we have avoided any further hypoglycemia.  She has scheduled follow-up with her endocrinologist and will await further direction from them in an outpatient setting.  Until then her Lantus has been decreased to 50 units twice daily and her bolus NovoLog has decreased to 50 units 3 times daily.  Unfortunately she has gastroparesis as a complication of diabetes as well as neuropathy.  GI saw in consultation and performed an EGD per Dr. Guadarrama on 4/3/2021.  He felt Candida was present and started the patient on Diflucan.  KOH washings from that scope are actually unremarkable though you can see Candida-like plaques on the EGD pictures so we will go ahead and complete treatment with Diflucan especially given recent issues with hyperglycemia and uncontrolled diabetes.  Her diuretics have been resumed and initial KUB thought there were concerns for large volume ascites but that was discredited with ultrasound-guided paracentesis as no fluid was present for removal.  Dehydration is resolved at this point and previous issues with nausea and vomiting have also resolved and she is already on scheduled Reglan prior to admission.  She still notes some dysphagia and speech therapy is going to see the patient prior to discharge but I am not sure much more can be done for this.  Hopefully patient's dysphagia improves with further treatment of Candida.  At this juncture patient feels much better clinically and she is amenable to discharge this afternoon after evaluation by speech therapy.  No family was present at bedside during  the time of my exam though answered all questions to the patient and she did not asked me to reach out discussed her case further with any additional family.  I feel the patient is medically stable for disposition at this point and she is also amenable to the above.  CCP is help coordinate any discharge needs and patient denies any additional needs.       Condition on Discharge: Improved.     Temp:  [97.8 °F (36.6 °C)-98.6 °F (37 °C)] 98.4 °F (36.9 °C)  Heart Rate:  [] 96  Resp:  [16-18] 18  BP: (131-159)/(66-74) 139/66  Body mass index is 29.57 kg/m².    Physical Exam  HENT:      Head: Normocephalic.      Nose: Nose normal.      Mouth/Throat:      Mouth: Mucous membranes are moist.      Pharynx: Oropharynx is clear.   Eyes:      General: No scleral icterus.     Conjunctiva/sclera: Conjunctivae normal.   Cardiovascular:      Rate and Rhythm: Normal rate and regular rhythm.   Pulmonary:      Effort: Pulmonary effort is normal.      Breath sounds: Normal breath sounds.   Abdominal:      General: Bowel sounds are normal.      Palpations: Abdomen is soft.      Tenderness: There is no abdominal tenderness. There is no guarding or rebound.   Musculoskeletal:         General: No swelling.   Skin:     Coloration: Skin is not jaundiced.   Neurological:      Mental Status: She is alert and oriented to person, place, and time. Mental status is at baseline.      Cranial Nerves: No cranial nerve deficit.     [unfilled]    Disposition: Home or Self Care       Discharge Medications      New Medications      Instructions Start Date   fluconazole 200 MG tablet  Commonly known as: DIFLUCAN   200 mg, Oral, Every 24 Hours      insulin glargine 100 UNIT/ML injection  Commonly known as: LANTUS, SEMGLEE  Replaces: Insulin Glargine 100 UNIT/ML injection pen   50 Units, Subcutaneous, 2 Times Daily      insulin lispro 100 UNIT/ML injection  Commonly known as: ADMELOG  Replaces: Insulin Lispro (1 Unit Dial) 100 UNIT/ML solution  pen-injector   50 Units, Subcutaneous, 3 Times Daily Before Meals         Changes to Medications      Instructions Start Date   cholestyramine light 4 g powder  What changed: how much to take   4 g, Oral, 2 Times Daily      ferrous sulfate 325 (65 FE) MG tablet  What changed: when to take this   TAKE ONE TABLET BY MOUTH TWICE A DAY      FreeStyle Roseline Sensor System  What changed: Another medication with the same name was added. Make sure you understand how and when to take each.   1 Device, Does not apply, Every 14 Days      FreeStyle Roseline 14 Day Sensor misc  What changed: You were already taking a medication with the same name, and this prescription was added. Make sure you understand how and when to take each.   Use as directed.      spironolactone 100 MG tablet  Commonly known as: ALDACTONE  What changed:   · when to take this  · reasons to take this   TAKE ONE TABLET BY MOUTH DAILY         Continue These Medications      Instructions Start Date   ALPRAZolam 0.5 MG tablet  Commonly known as: XANAX   0.5 mg, Oral, 2 Times Daily PRN      COLACE PO   2 capsules, Oral, Every Night at Bedtime      ergocalciferol 1.25 MG (34313 UT) capsule  Commonly known as: ERGOCALCIFEROL   Vitamin D2 1,250 mcg (50,000 unit) capsule      esomeprazole 40 MG capsule  Commonly known as: nexIUM   40 mg, Oral, Every Morning Before Breakfast      FLUoxetine 10 MG capsule  Commonly known as: PROzac   10 mg, Oral, Daily      furosemide 80 MG tablet  Commonly known as: LASIX   80 mg, Oral, Daily PRN      glucose blood test strip   Other, As Needed      hydrOXYzine 25 MG tablet  Commonly known as: ATARAX   50 mg, Oral, Nightly      Intrarosa 6.5 MG insert  Generic drug: Prasterone   1 each, Vaginal, 2 Times Weekly      levETIRAcetam 500 MG tablet  Commonly known as: KEPPRA   TAKE ONE TABLET BY MOUTH TWICE A DAY      lubiprostone 8 MCG capsule  Commonly known as: Amitiza   8 mcg, Oral, 2 Times Daily With Meals      Magnesium Oxide 400 MG  capsule   400 mg, Oral, Nightly      metoclopramide 10 MG tablet  Commonly known as: REGLAN   10 mg, Oral, 4 Times Daily Before Meals & Nightly      MiraLax 17 GM/SCOOP powder  Generic drug: polyethylene glycol   17 g, Oral, Daily PRN      mirtazapine 15 MG tablet  Commonly known as: REMERON   15 mg, Oral, Nightly      multivitamin with minerals tablet tablet   1 tablet, Oral, Daily      Oxaydo 7.5 MG tablet   Generic drug: oxyCODONE HCl   7.5 mg, Oral, Every 8 Hours PRN      pregabalin 75 MG capsule  Commonly known as: LYRICA   75 mg, Oral, 2 Times Daily      promethazine 25 MG tablet  Commonly known as: PHENERGAN   25 mg, Oral, Every 8 Hours PRN      riFAXIMin 550 MG tablet  Commonly known as: Xifaxan   550 mg, Oral, 2 Times Daily      sucralfate 1 GM/10ML suspension  Commonly known as: CARAFATE   1 g, Oral, 3 Times Daily      vitamin B-12 1000 MCG tablet  Commonly known as: CYANOCOBALAMIN   1,000 mcg, Oral, Daily      Vitamin D3 25 MCG (1000 UT) capsule   1,000 Units, Oral, Daily         Stop These Medications    Insulin Glargine 100 UNIT/ML injection pen  Commonly known as: LANTUS SOLOSTAR  Replaced by: insulin glargine 100 UNIT/ML injection     Insulin Lispro (1 Unit Dial) 100 UNIT/ML solution pen-injector  Commonly known as: HumaLOG KwikPen  Replaced by: insulin lispro 100 UNIT/ML injection             Additional Instructions for the Follow-ups that You Need to Schedule     Discharge Follow-up with PCP   As directed       Currently Documented PCP:    Kulwant Martínez APRN    PCP Phone Number:    [phone]     Follow Up Details: PCP 2 to 3 weeks.  Endocrinology as already scheduled.  GI per their recommendations           Follow-up Information     Kulwant Martínez APRN .    Specialty: Internal Medicine  Why: PCP 2 to 3 weeks.  Endocrinology as already scheduled.  GI per their recommendations  Contact information:  [address] [place] [address]  [phone]                  Pending Labs     Order  Current Status    Tissue Pathology Exam In process    Blood Culture - Blood, Arm, Left Preliminary result    Blood Culture - Blood, Arm, Right Preliminary result         Mihai Ayala MD  04/06/21  08:49 EDT    Discharge time spent greater than 30 minutes.    Electronically signed by Mihai Ayala MD at 04/06/21 0850               Liset Rayo, RN      Case Management   Progress Notes      Signed   Date of Service:  04/06/21 1820   Creation Time:  04/07/21 0802            Signed             Show:Clear all  []Manual[x]Template[]Copied    Added by:  [x]Liset Rayo, RN    []Hover for details  Case Management Discharge Note        Final Note: Pt discharged home on 4/6.  STEVAN Rayo RN            Selected Continued Care - Discharged on 4/6/2021 Admission date: 4/1/2021 - Discharge disposition: Home or Self Care     Destination    No services have been selected for the patient.                 Durable Medical Equipment    No services have been selected for the patient.                 Dialysis/Infusion    No services have been selected for the patient.                 Home Medical Care    No services have been selected for the patient.                 Therapy    No services have been selected for the patient.                 Community Resources    No services have been selected for the patient.                      Transportation Services  Private: Car     Final Discharge Disposition Code: 01 - home or self-care

## 2021-04-07 NOTE — PROGRESS NOTES
Case Management Discharge Note      Final Note: Pt discharged home on 4/6.  STEVAN Rayo RN         Selected Continued Care - Discharged on 4/6/2021 Admission date: 4/1/2021 - Discharge disposition: Home or Self Care    Destination    No services have been selected for the patient.              Durable Medical Equipment    No services have been selected for the patient.              Dialysis/Infusion    No services have been selected for the patient.              Home Medical Care    No services have been selected for the patient.              Therapy    No services have been selected for the patient.              Community Resources    No services have been selected for the patient.                  Transportation Services  Private: Car    Final Discharge Disposition Code: 01 - home or self-care

## 2021-04-07 NOTE — OUTREACH NOTE
Prep Survey      Responses   [facility] patient discharged from?  [place]   Is LACE score < 7 ?  No   Emergency Room discharge w/ pulse ox?  No   Eligibility  [facility]   Date of Admission  04/01/21   Date of Discharge  04/06/21   Discharge Disposition  Home or Self Care   Discharge diagnosis  Uncontrolled diabetes mellitus with hyperglycemia    Does the patient have one of the following disease processes/diagnoses(primary or secondary)?  Other   Does the patient have Home health ordered?  No   Is there a DME ordered?  No   Prep survey completed?  Yes          Katheryn Israel RN

## 2021-04-08 ENCOUNTER — TRANSITIONAL CARE MANAGEMENT TELEPHONE ENCOUNTER (OUTPATIENT)
Dept: CALL CENTER | Facility: HOSPITAL | Age: 61
End: 2021-04-08

## 2021-04-08 ENCOUNTER — TELEPHONE (OUTPATIENT)
Dept: INTERNAL MEDICINE | Age: 61
End: 2021-04-08

## 2021-04-08 RX ORDER — PEN NEEDLE, DIABETIC 31 GX5/16"
1 NEEDLE, DISPOSABLE MISCELLANEOUS 2 TIMES DAILY
Qty: 100 EACH | Refills: 5 | Status: SHIPPED | OUTPATIENT
Start: 2021-04-08 | End: 2022-02-08

## 2021-04-08 RX ORDER — INSULIN DETEMIR 100 [IU]/ML
50 INJECTION, SOLUTION SUBCUTANEOUS 2 TIMES DAILY
Qty: 10 PEN | Refills: 5 | Status: SHIPPED | OUTPATIENT
Start: 2021-04-08 | End: 2021-04-09

## 2021-04-08 NOTE — TELEPHONE ENCOUNTER
Caller: Silvia Zabala    Relationship to patient: Self    Best call back number: [phone]    Patient is needing: PATIENT NEEDS HER INSULIN CHANGED FROM THE LANTUS TO LEVAMIR, DUE TO INSURANCE REASONS. PLEASE REACH OUT TO PATIENT WITH ANY QUESTIONS.

## 2021-04-08 NOTE — OUTREACH NOTE
Call Center TCM Note      Responses   [facility] patient discharged from?  [place]   Does the patient have one of the following disease processes/diagnoses(primary or secondary)?  Other   TCM attempt successful?  No   Unsuccessful attempts  Attempt 3   Wrap up additional comments  Unable to reach pt x 3 attempts. Pt has been in touch with PCP re: issues with new Insulin rx and ins, and pt is sched for TCM FWP on 04/12/2021.          Blanca Mcgee MA    4/8/2021, 16:36 EDT

## 2021-04-09 RX ORDER — INSULIN DETEMIR 100 [IU]/ML
40 INJECTION, SOLUTION SUBCUTANEOUS 2 TIMES DAILY
Qty: 10 PEN | Refills: 5 | Status: SHIPPED | OUTPATIENT
Start: 2021-04-09 | End: 2021-04-22 | Stop reason: SDUPTHER

## 2021-04-12 ENCOUNTER — IMMUNIZATION (OUTPATIENT)
Dept: VACCINE CLINIC | Facility: HOSPITAL | Age: 61
End: 2021-04-12

## 2021-04-12 ENCOUNTER — OFFICE VISIT (OUTPATIENT)
Dept: INTERNAL MEDICINE | Age: 61
End: 2021-04-12

## 2021-04-12 VITALS
BODY MASS INDEX: 28.91 KG/M2 | DIASTOLIC BLOOD PRESSURE: 66 MMHG | HEIGHT: 67 IN | SYSTOLIC BLOOD PRESSURE: 128 MMHG | TEMPERATURE: 97.3 F | OXYGEN SATURATION: 99 % | WEIGHT: 184.2 LBS | HEART RATE: 102 BPM

## 2021-04-12 DIAGNOSIS — Z09 HOSPITAL DISCHARGE FOLLOW-UP: Primary | ICD-10-CM

## 2021-04-12 DIAGNOSIS — E11.65 TYPE 2 DIABETES MELLITUS WITH HYPERGLYCEMIA, WITHOUT LONG-TERM CURRENT USE OF INSULIN (HCC): ICD-10-CM

## 2021-04-12 DIAGNOSIS — Z23 IMMUNIZATION DUE: ICD-10-CM

## 2021-04-12 PROCEDURE — 0001A: CPT | Performed by: INTERNAL MEDICINE

## 2021-04-12 PROCEDURE — 91300 HC SARSCOV02 VAC 30MCG/0.3ML IM: CPT | Performed by: INTERNAL MEDICINE

## 2021-04-12 PROCEDURE — 99495 TRANSJ CARE MGMT MOD F2F 14D: CPT | Performed by: NURSE PRACTITIONER

## 2021-04-12 PROCEDURE — 1111F DSCHRG MED/CURRENT MED MERGE: CPT | Performed by: NURSE PRACTITIONER

## 2021-04-12 NOTE — PROGRESS NOTES
Transitional Care Follow Up Visit  Subjective     Silvia Zabala is a 60 y.o. female who presents for a transitional care management visit.    Within 48 business hours after discharge our office contacted her via telephone to coordinate her care and needs.      I reviewed and discussed the details of that call along with the discharge summary, hospital problems, inpatient lab results, inpatient diagnostic studies, and consultation reports with Silvia.     Current outpatient and discharge medications have been reconciled for the patient.  Reviewed by: JOEL Sher      Date of TCM Phone Call 4/6/2021   [facility]   Date of Admission 4/1/2021   Date of Discharge 4/6/2021   Discharge Disposition Home or Self Care     Risk for Readmission (LACE) Score: 14 (4/6/2021  6:00 AM)      History of Present Illness   Course During Hospital Stay: Patient presented to emergency room due to blood glucose in the 400-500 range.  She was not in DKA although she was hydrated with IV fluids as well as adjustments in her insulin regimen were made.  Unfortunately insurance is no longer paying for Lantus and she needs to switch to Levemir at this time.  During hospitalization they decreased her Lantus to 50 units twice daily along with increasing her NovoLog to 60 units 3 times a day with meals.  GI was consulted during hospitalization and performed an EGD on the third in which candidiasis presented and she was started on Diflucan daily which she is still taking today.  Her diuretics were shortly continued an initial KUB was thought to have large ascites but was discredited with ultrasound-guided paracentesis with no fluid removal.  She was continued on scheduled Reglan as she was previously on before admission.  Speech therapy saw patient but dysphagia is at her baseline with portal hypertensive gastropathy and gastroparesis nausea and vomiting.  For chronic constipation GI changed her regimen to polythene  glycol 17 g once a day and Amitiza 24 MCG 1 tablet twice daily with food.  Overall she tells me she is doing much better than previous.    The following portions of the patient's history were reviewed and updated as appropriate: allergies, current medications, past family history, past medical history, past social history, past surgical history and problem list.    Review of Systems   Constitutional neg except per HPI   Resp neg  CV neg  GI baseline abdominal pain     Objective   Physical Exam   Constitutional  No distress  Cardiovascular Rate  normal . Rhythm: regular . Heart sounds:  normal  Pulmonary/Chest  Effort normal. Breath sounds:  normal  Psychiatric  Alert. Judgment and thought content normal. Mood normal     Assessment/Plan   Problems Addressed this Visit        Health Encounters    Hospital discharge follow-up - Primary      Other Visit Diagnoses     Type 2 diabetes mellitus with hyperglycemia, without long-term current use of insulin (CMS/[facility])          Diagnoses       Codes Comments    Hospital discharge follow-up    -  Primary ICD-10-CM: Z09  ICD-9-CM: V67.59     Type 2 diabetes mellitus with hyperglycemia, without long-term current use of insulin (CMS/[facility])     ICD-10-CM: E11.65  ICD-9-CM: 250.00, 790.29         · Continue management with endocrinology on April 22 of this month.  Sent a new prescription for Levemir rather than Lantus with insurance issues.  · Continue follow-up with gastroenterology and new regimen for constipation.

## 2021-04-14 ENCOUNTER — READMISSION MANAGEMENT (OUTPATIENT)
Dept: CALL CENTER | Facility: HOSPITAL | Age: 61
End: 2021-04-14

## 2021-04-14 ENCOUNTER — TELEPHONE (OUTPATIENT)
Dept: GASTROENTEROLOGY | Facility: CLINIC | Age: 61
End: 2021-04-14

## 2021-04-14 NOTE — TELEPHONE ENCOUNTER
Mild inflammation seen on gastric biopsy    Lower esophagus shows inflammation consistent with acid reflux.  Badillo's esophagus was not seen.  Continue current medications.    Follow-up as scheduled

## 2021-04-14 NOTE — OUTREACH NOTE
Medical Week 2 Survey      Responses   [facility] patient discharged from?  [place]   Does the patient have one of the following disease processes/diagnoses(primary or secondary)?  Other   Week 2 attempt successful?  No   Unsuccessful attempts  Attempt 1          Masood Rey RN

## 2021-04-16 ENCOUNTER — TELEPHONE (OUTPATIENT)
Dept: INTERNAL MEDICINE | Age: 61
End: 2021-04-16

## 2021-04-16 ENCOUNTER — APPOINTMENT (OUTPATIENT)
Dept: OTHER | Facility: HOSPITAL | Age: 61
End: 2021-04-16

## 2021-04-16 NOTE — TELEPHONE ENCOUNTER
LVM instructing pt to call office r/t US breast. We need to know if she is going to have this done/rescheduled/ or cancel order? MM

## 2021-04-22 ENCOUNTER — OFFICE VISIT (OUTPATIENT)
Dept: ENDOCRINOLOGY | Age: 61
End: 2021-04-22

## 2021-04-22 VITALS
DIASTOLIC BLOOD PRESSURE: 80 MMHG | WEIGHT: 198.4 LBS | HEART RATE: 110 BPM | SYSTOLIC BLOOD PRESSURE: 164 MMHG | OXYGEN SATURATION: 98 % | BODY MASS INDEX: 31.14 KG/M2 | HEIGHT: 67 IN

## 2021-04-22 DIAGNOSIS — K74.60 CIRRHOSIS OF LIVER WITH ASCITES, UNSPECIFIED HEPATIC CIRRHOSIS TYPE (HCC): ICD-10-CM

## 2021-04-22 DIAGNOSIS — R18.8 CIRRHOSIS OF LIVER WITH ASCITES, UNSPECIFIED HEPATIC CIRRHOSIS TYPE (HCC): ICD-10-CM

## 2021-04-22 DIAGNOSIS — E11.42 DIABETIC PERIPHERAL NEUROPATHY (HCC): ICD-10-CM

## 2021-04-22 DIAGNOSIS — K75.81 NASH (NONALCOHOLIC STEATOHEPATITIS): ICD-10-CM

## 2021-04-22 DIAGNOSIS — K31.84 GASTROPARESIS: ICD-10-CM

## 2021-04-22 DIAGNOSIS — E11.65 TYPE 2 DIABETES MELLITUS WITH HYPERGLYCEMIA, WITH LONG-TERM CURRENT USE OF INSULIN (HCC): ICD-10-CM

## 2021-04-22 DIAGNOSIS — Z79.4 TYPE 2 DIABETES MELLITUS WITH HYPERGLYCEMIA, WITH LONG-TERM CURRENT USE OF INSULIN (HCC): ICD-10-CM

## 2021-04-22 DIAGNOSIS — IMO0002 SEVERE UNCONTROLLED DIABETES MELLITUS: ICD-10-CM

## 2021-04-22 DIAGNOSIS — Z79.4 LONG-TERM INSULIN USE (HCC): Primary | ICD-10-CM

## 2021-04-22 PROCEDURE — 99215 OFFICE O/P EST HI 40 MIN: CPT | Performed by: INTERNAL MEDICINE

## 2021-04-22 RX ORDER — METAXALONE 800 MG/1
800 TABLET ORAL 3 TIMES DAILY
COMMUNITY

## 2021-04-22 RX ORDER — INSULIN DETEMIR 100 [IU]/ML
80 INJECTION, SOLUTION SUBCUTANEOUS 2 TIMES DAILY
Qty: 10 PEN | Refills: 5 | Status: SHIPPED | OUTPATIENT
Start: 2021-04-22 | End: 2021-08-30 | Stop reason: SDUPTHER

## 2021-04-22 RX ORDER — INSULIN LISPRO 100 [IU]/ML
70 INJECTION, SOLUTION INTRAVENOUS; SUBCUTANEOUS
Qty: 20 ML | Refills: 12
Start: 2021-04-22 | End: 2021-07-02

## 2021-04-22 NOTE — PATIENT INSTRUCTIONS
Levemir 80 units in the morning and 60 units at bed time.     Admelog/humalog 60 units with each meal or snack - 3 times a day     Use sliding scale  3 times a day.   BG less than 150 - zero  151 - 200 - 2 unit  201 - 250 - 4 units  251 - 300 - 6 units  301 - 350 - 8 units  Above 350 mg/dl - 10 units.     Take 30 units of the humalog or admelog with boost.

## 2021-04-22 NOTE — PROGRESS NOTES
"Chief Complaint  Chief Complaint   Patient presents with   • Diabetes       Subjective          History of Present Illness    Silvia Zabala 60 y.o. presents with Type 2 dm is here for F/U. She failed to foolow up with since 2019.     Type 2 dm - Diagnosed in her 20's  Today in clinic pt reports being on levemir 60 units bid, admelog 60 units tid ac. Not adding the ssi as much as she should, as she is worried about low BG and since she consistently doesn't eat as well because of her gastroparesis.   FBG - 200 - 300  Pre meals - 300 - 335  Checks BG - 4 - 5 times a day  Sensor - Freestyle jesusita  Dm retinopathy - none that she knows of,Last eye exam - due for exam  Dm nephropathy - no  Dm neuropathy - yes,Dm neuropathy meds - on lyrica  CAD -no  CVA - ? TIA  Episodes of hypoglycemia - lowest Bg was 38 mg/dl when she was in hospital with gastroparesis.   Pt is physically active. weight has been stable.   Pt tries to follow DM diet for most part.   On Ace inb.    Pancreatic divisum : No hx of pancreatitis per pt.       Liver disease/DUKES -  Sees Dr. Jordan from [facility].       Gastroparesis - Sees Dr. Coleman. Recently admitted in April 2021 with this.       ITP - diagnosed in May 2018. Currently stable. Seen by Hematology and oncology.      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   She recently lost her dad in Sep 2019 with cancer and since then she has been depressed and was not taking care of herself well with her BG control.       Reviewed primary care physician's/consulting physician documentation and lab results         I have reviewed the patient's allergies, medicines, past medical hx, family hx and social hx in detail.    Objective   Vital Signs:   /80   Pulse 110   Ht 170.2 cm (67\")   Wt 90 kg (198 lb 6.4 oz)   LMP  (LMP Unknown)   SpO2 98%   BMI 31.07 kg/m²   Physical Exam   General appearance - no distress  Eyes- anicteric sclera  Ear nose and throat-external ears and nose normal.    Respiratory-normal chest " on inspection.  No respiratory distress noted.  Skin-no rashes.  Neuro-alert and oriented x3            Result Review :   The following data was reviewed by: Merary Burnett MD on 04/22/2021:  Admission on 04/01/2021, Discharged on 04/06/2021   Component Date Value Ref Range Status   • Glucose 04/01/2021 606* 65 - 99 mg/dL Final   • BUN 04/01/2021 23* 6 - 20 mg/dL Final   • Creatinine 04/01/2021 1.05* 0.57 - 1.00 mg/dL Final   • Sodium 04/01/2021 130* 136 - 145 mmol/L Final   • Potassium 04/01/2021 4.1  3.5 - 5.2 mmol/L Final   • Chloride 04/01/2021 94* 98 - 107 mmol/L Final   • CO2 04/01/2021 21.6* 22.0 - 29.0 mmol/L Final   • Calcium 04/01/2021 9.6  8.6 - 10.5 mg/dL Final   • Total Protein 04/01/2021 6.4  6.0 - 8.5 g/dL Final   • Albumin 04/01/2021 3.90  3.50 - 5.20 g/dL Final   • ALT (SGPT) 04/01/2021 29  1 - 33 U/L Final   • AST (SGOT) 04/01/2021 28  1 - 32 U/L Final   • Alkaline Phosphatase 04/01/2021 180* 39 - 117 U/L Final   • Total Bilirubin 04/01/2021 2.2* 0.0 - 1.2 mg/dL Final   • eGFR Non African Amer 04/01/2021 54* >60 mL/min/1.73 Final   • Globulin 04/01/2021 2.5  gm/dL Final   • A/G Ratio 04/01/2021 1.6  g/dL Final   • BUN/Creatinine Ratio 04/01/2021 21.9  7.0 - 25.0 Final   • Anion Gap 04/01/2021 14.4  5.0 - 15.0 mmol/L Final   • Protime 04/01/2021 16.3* 11.7 - 14.2 Seconds Final   • INR 04/01/2021 1.33* 0.90 - 1.10 Final   • Lipase 04/01/2021 11* 13 - 60 U/L Final   • Color, UA 04/01/2021 Yellow  Yellow, Straw Final   • Appearance, UA 04/01/2021 Clear  Clear Final   • pH, UA 04/01/2021 <=5.0  5.0 - 8.0 Final   • Specific Gravity, UA 04/01/2021 >=1.030  1.005 - 1.030 Final   • Glucose, UA 04/01/2021 >=1000 mg/dL (3+)* Negative Final   • Ketones, UA 04/01/2021 15 mg/dL (1+)* Negative Final   • Bilirubin, UA 04/01/2021 Negative  Negative Final   • Blood, UA 04/01/2021 Negative  Negative Final   • Protein, UA 04/01/2021 Negative  Negative Final   • Leuk Esterase, UA 04/01/2021 Negative  Negative Final    • Nitrite, UA 04/01/2021 Negative  Negative Final   • Urobilinogen, UA 04/01/2021 0.2 E.U./dL  0.2 - 1.0 E.U./dL Final   • Lactate 04/01/2021 3.3* 0.5 - 2.0 mmol/L Final   • Procalcitonin 04/01/2021 0.34* 0.00 - 0.25 ng/mL Final   • Blood Culture 04/01/2021 No growth at 5 days   Final   • Blood Culture 04/01/2021 No growth at 5 days   Final   • Amphet/Methamphet, Screen 04/01/2021 Negative  Negative Final   • Barbiturates Screen, Urine 04/01/2021 Negative  Negative Final   • Benzodiazepine Screen, Urine 04/01/2021 Negative  Negative Final   • Cocaine Screen, Urine 04/01/2021 Negative  Negative Final   • Opiate Screen 04/01/2021 Negative  Negative Final   • THC, Screen, Urine 04/01/2021 Negative  Negative Final   • Methadone Screen, Urine 04/01/2021 Negative  Negative Final   • Oxycodone Screen, Urine 04/01/2021 Positive* Negative Final   • Ethanol 04/01/2021 <10  0 - 10 mg/dL Final   • Ethanol % 04/01/2021 <0.010  % Final   • Magnesium 04/01/2021 1.7  1.6 - 2.6 mg/dL Final   • QT Interval 04/01/2021 384  ms Final   • COVID19 04/01/2021 Not Detected  Not Detected - Ref. Range Final   • WBC 04/01/2021 4.71  3.40 - 10.80 10*3/mm3 Final   • RBC 04/01/2021 4.37  3.77 - 5.28 10*6/mm3 Final   • Hemoglobin 04/01/2021 13.3  12.0 - 15.9 g/dL Final   • Hematocrit 04/01/2021 39.2  34.0 - 46.6 % Final   • MCV 04/01/2021 89.7  79.0 - 97.0 fL Final   • MCH 04/01/2021 30.4  26.6 - 33.0 pg Final   • MCHC 04/01/2021 33.9  31.5 - 35.7 g/dL Final   • RDW 04/01/2021 14.5  12.3 - 15.4 % Final   • RDW-SD 04/01/2021 47.1  37.0 - 54.0 fl Final   • MPV 04/01/2021 10.1  6.0 - 12.0 fL Final   • Platelets 04/01/2021 107* 140 - 450 10*3/mm3 Final   • Neutrophil % 04/01/2021 78.6* 42.7 - 76.0 % Final   • Lymphocyte % 04/01/2021 12.5* 19.6 - 45.3 % Final   • Monocyte % 04/01/2021 6.4  5.0 - 12.0 % Final   • Eosinophil % 04/01/2021 1.1  0.3 - 6.2 % Final   • Basophil % 04/01/2021 0.6  0.0 - 1.5 % Final   • Immature Grans % 04/01/2021 0.8* 0.0 -  0.5 % Final   • Neutrophils, Absolute 04/01/2021 3.70  1.70 - 7.00 10*3/mm3 Final   • Lymphocytes, Absolute 04/01/2021 0.59* 0.70 - 3.10 10*3/mm3 Final   • Monocytes, Absolute 04/01/2021 0.30  0.10 - 0.90 10*3/mm3 Final   • Eosinophils, Absolute 04/01/2021 0.05  0.00 - 0.40 10*3/mm3 Final   • Basophils, Absolute 04/01/2021 0.03  0.00 - 0.20 10*3/mm3 Final   • Immature Grans, Absolute 04/01/2021 0.04  0.00 - 0.05 10*3/mm3 Final   • nRBC 04/01/2021 0.0  0.0 - 0.2 /100 WBC Final   • Extra Tube 04/01/2021 hold for add-on   Final    Auto resulted   • Extra Tube 04/01/2021 Hold for add-ons.   Final    Auto resulted.   • Extra Tube 04/01/2021 hold for add-on   Final    Auto resulted   • Extra Tube 04/01/2021 Hold for add-ons.   Final    Auto resulted.   • Lactate 04/01/2021 2.7* 0.5 - 2.0 mmol/L Final   • Glucose 04/01/2021 445* 70 - 130 mg/dL Final   • Glucose 04/01/2021 357* 70 - 130 mg/dL Final   • Glucose 04/02/2021 367* 65 - 99 mg/dL Final   • BUN 04/02/2021 20  6 - 20 mg/dL Final   • Creatinine 04/02/2021 0.82  0.57 - 1.00 mg/dL Final   • Sodium 04/02/2021 132* 136 - 145 mmol/L Final   • Potassium 04/02/2021 4.3  3.5 - 5.2 mmol/L Final   • Chloride 04/02/2021 101  98 - 107 mmol/L Final   • CO2 04/02/2021 21.4* 22.0 - 29.0 mmol/L Final   • Calcium 04/02/2021 8.5* 8.6 - 10.5 mg/dL Final   • Total Protein 04/02/2021 5.3* 6.0 - 8.5 g/dL Final   • Albumin 04/02/2021 3.20* 3.50 - 5.20 g/dL Final   • ALT (SGPT) 04/02/2021 23  1 - 33 U/L Final   • AST (SGOT) 04/02/2021 24  1 - 32 U/L Final   • Alkaline Phosphatase 04/02/2021 141* 39 - 117 U/L Final   • Total Bilirubin 04/02/2021 1.4* 0.0 - 1.2 mg/dL Final   • eGFR Non African Amer 04/02/2021 72  >60 mL/min/1.73 Final   • Globulin 04/02/2021 2.1  gm/dL Final   • A/G Ratio 04/02/2021 1.5  g/dL Final   • BUN/Creatinine Ratio 04/02/2021 24.4  7.0 - 25.0 Final   • Anion Gap 04/02/2021 9.6  5.0 - 15.0 mmol/L Final   • WBC 04/02/2021 4.08  3.40 - 10.80 10*3/mm3 Final   • RBC  04/02/2021 3.69* 3.77 - 5.28 10*6/mm3 Final   • Hemoglobin 04/02/2021 11.2* 12.0 - 15.9 g/dL Final   • Hematocrit 04/02/2021 32.3* 34.0 - 46.6 % Final   • MCV 04/02/2021 87.5  79.0 - 97.0 fL Final   • MCH 04/02/2021 30.4  26.6 - 33.0 pg Final   • MCHC 04/02/2021 34.7  31.5 - 35.7 g/dL Final   • RDW 04/02/2021 14.5  12.3 - 15.4 % Final   • RDW-SD 04/02/2021 46.7  37.0 - 54.0 fl Final   • MPV 04/02/2021 9.8  6.0 - 12.0 fL Final   • Platelets 04/02/2021 95* 140 - 450 10*3/mm3 Final   • Neutrophil % 04/02/2021 66.6  42.7 - 76.0 % Final   • Lymphocyte % 04/02/2021 20.3  19.6 - 45.3 % Final   • Monocyte % 04/02/2021 10.0  5.0 - 12.0 % Final   • Eosinophil % 04/02/2021 1.7  0.3 - 6.2 % Final   • Basophil % 04/02/2021 0.7  0.0 - 1.5 % Final   • Immature Grans % 04/02/2021 0.7* 0.0 - 0.5 % Final   • Neutrophils, Absolute 04/02/2021 2.71  1.70 - 7.00 10*3/mm3 Final   • Lymphocytes, Absolute 04/02/2021 0.83  0.70 - 3.10 10*3/mm3 Final   • Monocytes, Absolute 04/02/2021 0.41  0.10 - 0.90 10*3/mm3 Final   • Eosinophils, Absolute 04/02/2021 0.07  0.00 - 0.40 10*3/mm3 Final   • Basophils, Absolute 04/02/2021 0.03  0.00 - 0.20 10*3/mm3 Final   • Immature Grans, Absolute 04/02/2021 0.03  0.00 - 0.05 10*3/mm3 Final   • nRBC 04/02/2021 0.0  0.0 - 0.2 /100 WBC Final   • Lactate 04/02/2021 1.3  0.5 - 2.0 mmol/L Final   • Glucose 04/02/2021 355* 70 - 130 mg/dL Final   • Glucose 04/02/2021 392* 70 - 130 mg/dL Final   • Glucose 04/02/2021 389* 70 - 130 mg/dL Final   • Glucose 04/02/2021 240* 70 - 130 mg/dL Final   • Glucose 04/02/2021 97  70 - 130 mg/dL Final   • Glucose 04/03/2021 87  70 - 130 mg/dL Final   • Addendum 04/03/2021    Final                    Value:This result contains rich text formatting which cannot be displayed here.   • Case Report 04/03/2021    Final                    Value:Surgical Pathology Report                         Case: [id number]                                  Authorizing Provider:  Endy Vasquez MD            Collected:           04/03/2021 10:06 AM          Ordering Location:     [facility]  Received:            04/05/2021 07:35 AM                                 ENDO SUITES                                                                  Pathologist:           Reynaldo Cramer MD                                                         Specimens:   1) - Stomach, RANDOM GASTRIC BIOPSIES                                                               2) - Esophagus, Distal, DISTAL ESOPHAGEAL BIOPSIES                                        • Final Diagnosis 04/03/2021    Final                    Value:This result contains rich text formatting which cannot be displayed here.   • Comment 04/03/2021    Final                    Value:This result contains rich text formatting which cannot be displayed here.   • Gross Description 04/03/2021    Final                    Value:This result contains rich text formatting which cannot be displayed here.   • KOH Prep 04/03/2021 No yeast or hyphal elements seen  No yeast or hyphal elements seen Final   • Glucose 04/03/2021 88  70 - 130 mg/dL Final   • Glucose 04/03/2021 142* 70 - 130 mg/dL Final   • Glucose 04/03/2021 204* 70 - 130 mg/dL Final   • Glucose 04/04/2021 65* 70 - 130 mg/dL Final   • Glucose 04/04/2021 224* 70 - 130 mg/dL Final   • Glucose 04/04/2021 230* 70 - 130 mg/dL Final   • Glucose 04/04/2021 84  70 - 130 mg/dL Final   • Glucose 04/04/2021 120  70 - 130 mg/dL Final   • Glucose 04/05/2021 143* 65 - 99 mg/dL Final   • BUN 04/05/2021 12  6 - 20 mg/dL Final   • Creatinine 04/05/2021 0.74  0.57 - 1.00 mg/dL Final   • Sodium 04/05/2021 138  136 - 145 mmol/L Final   • Potassium 04/05/2021 4.3  3.5 - 5.2 mmol/L Final    Slight hemolysis detected by analyzer. Results may be affected.   • Chloride 04/05/2021 109* 98 - 107 mmol/L Final   • CO2 04/05/2021 21.8* 22.0 - 29.0 mmol/L Final   • Calcium 04/05/2021 8.2* 8.6 - 10.5 mg/dL Final   • eGFR Non African Amer  04/05/2021 81  >60 mL/min/1.73 Final   • BUN/Creatinine Ratio 04/05/2021 16.2  7.0 - 25.0 Final   • Anion Gap 04/05/2021 7.2  5.0 - 15.0 mmol/L Final   • Glucose 04/05/2021 121  70 - 130 mg/dL Final   • Protime 04/05/2021 16.1* 11.7 - 14.2 Seconds Final   • INR 04/05/2021 1.32* 0.90 - 1.10 Final   • Glucose 04/05/2021 243* 70 - 130 mg/dL Final   • Glucose 04/05/2021 270* 70 - 130 mg/dL Final   • Glucose 04/05/2021 321* 70 - 130 mg/dL Final   • Glucose 04/06/2021 361* 70 - 130 mg/dL Final   • Glucose 04/06/2021 413* 70 - 130 mg/dL Final   • Glucose 04/06/2021 417* 70 - 130 mg/dL Final   • Glucose 04/06/2021 336* 70 - 130 mg/dL Final     Data reviewed: PCP and Hospital notes       Results Review:    I reviewed the patient's new clinical results.     Assessment and Plan    Problem List Items Addressed This Visit        Other    Cirrhosis of liver (CMS/HCC)    Relevant Orders    Hemoglobin A1c    Lipid Panel    Basic Metabolic Panel    TSH    T4, Free    Basic Metabolic Panel    Hemoglobin A1c    DUKES (nonalcoholic steatohepatitis)    Relevant Orders    Hemoglobin A1c    Lipid Panel    Basic Metabolic Panel    TSH    T4, Free    Basic Metabolic Panel    Hemoglobin A1c    Gastroparesis    Relevant Orders    Hemoglobin A1c    Lipid Panel    Basic Metabolic Panel    TSH    T4, Free    Basic Metabolic Panel    Hemoglobin A1c    Diabetic peripheral neuropathy (CMS/HCC)    Relevant Orders    Hemoglobin A1c    Lipid Panel    Basic Metabolic Panel    TSH    T4, Free    Basic Metabolic Panel    Hemoglobin A1c      Other Visit Diagnoses     Long-term insulin use (CMS/[facility])    -  Primary    Relevant Orders    Hemoglobin A1c    Lipid Panel    Basic Metabolic Panel    TSH    T4, Free    Basic Metabolic Panel    Hemoglobin A1c    Type 2 diabetes mellitus with hyperglycemia, with long-term current use of insulin (CMS/[facility])        Relevant Orders    Hemoglobin A1c    Lipid Panel    Basic Metabolic Panel    TSH    T4, Free    Basic  "Metabolic Panel    Hemoglobin A1c    Severe uncontrolled diabetes mellitus (CMS/[facility])        Relevant Orders    Hemoglobin A1c    Lipid Panel    Basic Metabolic Panel    TSH    T4, Free    Basic Metabolic Panel    Hemoglobin A1c        Type 2 diabetes mellitus-severely worse, complicated with severe hyperglycemia.  Patient is at high risk for hospitalizations.  Recently hospitalized in April 2021 with elevated blood sugars and gastroparesis.  Increase the dosage of Levemir to 80 units in the morning and 60 units at bedtime  Continue Humalog 60 units with each meal and with snacks.  Take 30 units of the Humalog with the boost.  Follow moderate dose Humalog sliding scale.    Abnormal thyroid panel  Check thyroid levels.    Reviewed the CGM information on the patient's phone and made the above changes.    I spent 40 minutes caring for Silvia on this date of service. This time includes time spent by me in the following activities:reviewing tests, obtaining and/or reviewing a separately obtained history, counseling and educating the patient/family/caregiver, ordering medications, tests, or procedures, referring and communicating with other health care professionals , documenting information in the medical record, independently interpreting results and communicating that information with the patient/family/caregiver and care coordination        Interpreted the blood work-up/imaging results performed by the primary care/consulting physician -    Refills sent to pharmacy    Follow Up     Patient was given instructions and counseling regarding her condition or for health maintenance advice. Please see specific information pulled into the AVS if appropriate.       Thank you for asking me to see your patient, Silvia Zabala in consultation.         Merary Burnett MD  04/22/21      EMR Dragon / transcription disclaimer:     \"Dictated utilizing Dragon dictation\".         "

## 2021-04-23 ENCOUNTER — TELEPHONE (OUTPATIENT)
Dept: ENDOCRINOLOGY | Age: 61
End: 2021-04-23

## 2021-04-23 LAB
BUN SERPL-MCNC: 15 MG/DL (ref 6–20)
BUN/CREAT SERPL: 16 (ref 7–25)
CALCIUM SERPL-MCNC: 9.4 MG/DL (ref 8.6–10.5)
CHLORIDE SERPL-SCNC: 101 MMOL/L (ref 98–107)
CHOLEST SERPL-MCNC: 133 MG/DL (ref 0–200)
CO2 SERPL-SCNC: 24.2 MMOL/L (ref 22–29)
CREAT SERPL-MCNC: 0.94 MG/DL (ref 0.57–1)
GLUCOSE SERPL-MCNC: 263 MG/DL (ref 65–99)
HBA1C MFR BLD: 8.8 % (ref 4.8–5.6)
HDLC SERPL-MCNC: 56 MG/DL (ref 40–60)
IMP & REVIEW OF LAB RESULTS: NORMAL
LDLC SERPL CALC-MCNC: 60 MG/DL (ref 0–100)
POTASSIUM SERPL-SCNC: 4.3 MMOL/L (ref 3.5–5.2)
SODIUM SERPL-SCNC: 134 MMOL/L (ref 136–145)
T4 FREE SERPL-MCNC: 1.15 NG/DL (ref 0.93–1.7)
TRIGL SERPL-MCNC: 88 MG/DL (ref 0–150)
TSH SERPL DL<=0.005 MIU/L-ACNC: 2.18 UIU/ML (ref 0.27–4.2)
VLDLC SERPL CALC-MCNC: 17 MG/DL (ref 5–40)

## 2021-04-23 NOTE — TELEPHONE ENCOUNTER
4/23 called and sw pt / told her to make sure and bring in her reader when she comes in   ----- Message from Merary Burnett MD sent at 4/23/2021 11:13 AM EDT -----  HbA1c is high at 8.8% as discussed in the clinic.  Please follow the instructions as discussed in the office visit.  Will make further changes based on the CGM information.  Would recommend the patient to drop in the freestyle jesusita device in 2 to 4 weeks from now to make further adjustments.    She has her own freestyle jesusita device, we need to download the sensor information when she comes in in 2 to 4 weeks.

## 2021-04-29 ENCOUNTER — HOSPITAL ENCOUNTER (OUTPATIENT)
Facility: HOSPITAL | Age: 61
Setting detail: OBSERVATION
Discharge: HOME | End: 2021-05-04
Attending: EMERGENCY MEDICINE | Admitting: HOSPITALIST

## 2021-04-29 ENCOUNTER — APPOINTMENT (OUTPATIENT)
Dept: CT IMAGING | Facility: HOSPITAL | Age: 61
End: 2021-04-29

## 2021-04-29 DIAGNOSIS — R11.2 INTRACTABLE VOMITING WITH NAUSEA: Primary | ICD-10-CM

## 2021-04-29 DIAGNOSIS — Z86.39 HISTORY OF DIABETIC GASTROPARESIS: ICD-10-CM

## 2021-04-29 DIAGNOSIS — R10.84 GENERALIZED ABDOMINAL PAIN: ICD-10-CM

## 2021-04-29 LAB
ALBUMIN SERPL-MCNC: 4.2 G/DL (ref 3.5–5.2)
ALBUMIN/GLOB SERPL: 1 G/DL
ALP SERPL-CCNC: 179 U/L (ref 39–117)
ALT SERPL W P-5'-P-CCNC: 38 U/L (ref 1–33)
ANION GAP SERPL CALCULATED.3IONS-SCNC: 15.7 MMOL/L (ref 5–15)
AST SERPL-CCNC: 56 U/L (ref 1–32)
B-OH-BUTYR SERPL-SCNC: 0.36 MMOL/L (ref 0.02–0.27)
BACTERIA UR QL AUTO: ABNORMAL /HPF
BACTERIA UR QL AUTO: ABNORMAL /HPF
BASOPHILS # BLD AUTO: 0.04 10*3/MM3 (ref 0–0.2)
BASOPHILS NFR BLD AUTO: 0.5 % (ref 0–1.5)
BILIRUB SERPL-MCNC: 2.3 MG/DL (ref 0–1.2)
BILIRUB UR QL STRIP: NEGATIVE
BILIRUB UR QL STRIP: NEGATIVE
BUN SERPL-MCNC: 20 MG/DL (ref 6–20)
BUN/CREAT SERPL: 16.9 (ref 7–25)
CALCIUM SPEC-SCNC: 10 MG/DL (ref 8.6–10.5)
CHLORIDE SERPL-SCNC: 95 MMOL/L (ref 98–107)
CLARITY UR: CLEAR
CLARITY UR: CLEAR
CO2 SERPL-SCNC: 25.3 MMOL/L (ref 22–29)
COLOR UR: ABNORMAL
COLOR UR: ABNORMAL
CREAT SERPL-MCNC: 1.18 MG/DL (ref 0.57–1)
D-LACTATE SERPL-SCNC: 1.2 MMOL/L (ref 0.5–2)
D-LACTATE SERPL-SCNC: 2.4 MMOL/L (ref 0.5–2)
DEPRECATED RDW RBC AUTO: 48 FL (ref 37–54)
EOSINOPHIL # BLD AUTO: 0.04 10*3/MM3 (ref 0–0.4)
EOSINOPHIL NFR BLD AUTO: 0.5 % (ref 0.3–6.2)
ERYTHROCYTE [DISTWIDTH] IN BLOOD BY AUTOMATED COUNT: 15.8 % (ref 12.3–15.4)
GFR SERPL CREATININE-BSD FRML MDRD: 47 ML/MIN/1.73
GLOBULIN UR ELPH-MCNC: 4.1 GM/DL
GLUCOSE BLDC GLUCOMTR-MCNC: 261 MG/DL (ref 70–130)
GLUCOSE BLDC GLUCOMTR-MCNC: 329 MG/DL (ref 70–130)
GLUCOSE SERPL-MCNC: 200 MG/DL (ref 65–99)
GLUCOSE UR STRIP-MCNC: ABNORMAL MG/DL
GLUCOSE UR STRIP-MCNC: NEGATIVE MG/DL
HCT VFR BLD AUTO: 40.9 % (ref 34–46.6)
HGB BLD-MCNC: 14.4 G/DL (ref 12–15.9)
HGB UR QL STRIP.AUTO: NEGATIVE
HGB UR QL STRIP.AUTO: NEGATIVE
HYALINE CASTS UR QL AUTO: ABNORMAL /LPF
HYALINE CASTS UR QL AUTO: ABNORMAL /LPF
KETONES UR QL STRIP: ABNORMAL
KETONES UR QL STRIP: NEGATIVE
LEUKOCYTE ESTERASE UR QL STRIP.AUTO: ABNORMAL
LEUKOCYTE ESTERASE UR QL STRIP.AUTO: NEGATIVE
LIPASE SERPL-CCNC: 8 U/L (ref 13–60)
LYMPHOCYTES # BLD AUTO: 0.78 10*3/MM3 (ref 0.7–3.1)
LYMPHOCYTES NFR BLD AUTO: 10.2 % (ref 19.6–45.3)
MCH RBC QN AUTO: 29.5 PG (ref 26.6–33)
MCHC RBC AUTO-ENTMCNC: 35.2 G/DL (ref 31.5–35.7)
MCV RBC AUTO: 83.8 FL (ref 79–97)
MONOCYTES # BLD AUTO: 0.48 10*3/MM3 (ref 0.1–0.9)
MONOCYTES NFR BLD AUTO: 6.3 % (ref 5–12)
NEUTROPHILS NFR BLD AUTO: 6.24 10*3/MM3 (ref 1.7–7)
NEUTROPHILS NFR BLD AUTO: 82.1 % (ref 42.7–76)
NITRITE UR QL STRIP: NEGATIVE
NITRITE UR QL STRIP: NEGATIVE
PH UR STRIP.AUTO: 5.5 [PH] (ref 5–8)
PH UR STRIP.AUTO: <=5 [PH] (ref 5–8)
PLATELET # BLD AUTO: 134 10*3/MM3 (ref 140–450)
PMV BLD AUTO: 10.9 FL (ref 6–12)
POTASSIUM SERPL-SCNC: 3.8 MMOL/L (ref 3.5–5.2)
PROCALCITONIN SERPL-MCNC: 0.56 NG/ML (ref 0–0.25)
PROT SERPL-MCNC: 8.3 G/DL (ref 6–8.5)
PROT UR QL STRIP: ABNORMAL
PROT UR QL STRIP: ABNORMAL
RBC # BLD AUTO: 4.88 10*6/MM3 (ref 3.77–5.28)
RBC # UR: ABNORMAL /HPF
RBC # UR: ABNORMAL /HPF
REF LAB TEST METHOD: ABNORMAL
REF LAB TEST METHOD: ABNORMAL
SARS-COV-2 ORF1AB RESP QL NAA+PROBE: NOT DETECTED
SODIUM SERPL-SCNC: 136 MMOL/L (ref 136–145)
SP GR UR STRIP: 1.02 (ref 1–1.03)
SP GR UR STRIP: >=1.03 (ref 1–1.03)
SQUAMOUS #/AREA URNS HPF: ABNORMAL /HPF
SQUAMOUS #/AREA URNS HPF: ABNORMAL /HPF
UROBILINOGEN UR QL STRIP: ABNORMAL
UROBILINOGEN UR QL STRIP: ABNORMAL
WBC # BLD AUTO: 7.61 10*3/MM3 (ref 3.4–10.8)
WBC UR QL AUTO: ABNORMAL /HPF
WBC UR QL AUTO: ABNORMAL /HPF

## 2021-04-29 PROCEDURE — 96375 TX/PRO/DX INJ NEW DRUG ADDON: CPT

## 2021-04-29 PROCEDURE — G0378 HOSPITAL OBSERVATION PER HR: HCPCS

## 2021-04-29 PROCEDURE — C9803 HOPD COVID-19 SPEC COLLECT: HCPCS

## 2021-04-29 PROCEDURE — 82010 KETONE BODYS QUAN: CPT | Performed by: NURSE PRACTITIONER

## 2021-04-29 PROCEDURE — 83690 ASSAY OF LIPASE: CPT | Performed by: EMERGENCY MEDICINE

## 2021-04-29 PROCEDURE — 81001 URINALYSIS AUTO W/SCOPE: CPT | Performed by: EMERGENCY MEDICINE

## 2021-04-29 PROCEDURE — 25010000002 METOCLOPRAMIDE PER 10 MG: Performed by: NURSE PRACTITIONER

## 2021-04-29 PROCEDURE — 96376 TX/PRO/DX INJ SAME DRUG ADON: CPT

## 2021-04-29 PROCEDURE — 85025 COMPLETE CBC W/AUTO DIFF WBC: CPT | Performed by: EMERGENCY MEDICINE

## 2021-04-29 PROCEDURE — 25010000002 HYDROMORPHONE PER 4 MG: Performed by: NURSE PRACTITIONER

## 2021-04-29 PROCEDURE — 25010000002 IOPAMIDOL 61 % SOLUTION: Performed by: NURSE PRACTITIONER

## 2021-04-29 PROCEDURE — 80053 COMPREHEN METABOLIC PANEL: CPT | Performed by: EMERGENCY MEDICINE

## 2021-04-29 PROCEDURE — 96361 HYDRATE IV INFUSION ADD-ON: CPT

## 2021-04-29 PROCEDURE — 99284 EMERGENCY DEPT VISIT MOD MDM: CPT

## 2021-04-29 PROCEDURE — 84145 PROCALCITONIN (PCT): CPT | Performed by: NURSE PRACTITIONER

## 2021-04-29 PROCEDURE — 99213 OFFICE O/P EST LOW 20 MIN: CPT | Performed by: INTERNAL MEDICINE

## 2021-04-29 PROCEDURE — 93010 ELECTROCARDIOGRAM REPORT: CPT | Performed by: INTERNAL MEDICINE

## 2021-04-29 PROCEDURE — 82962 GLUCOSE BLOOD TEST: CPT

## 2021-04-29 PROCEDURE — 81001 URINALYSIS AUTO W/SCOPE: CPT | Performed by: STUDENT IN AN ORGANIZED HEALTH CARE EDUCATION/TRAINING PROGRAM

## 2021-04-29 PROCEDURE — U0004 COV-19 TEST NON-CDC HGH THRU: HCPCS | Performed by: EMERGENCY MEDICINE

## 2021-04-29 PROCEDURE — 93005 ELECTROCARDIOGRAM TRACING: CPT | Performed by: NURSE PRACTITIONER

## 2021-04-29 PROCEDURE — 83605 ASSAY OF LACTIC ACID: CPT | Performed by: NURSE PRACTITIONER

## 2021-04-29 PROCEDURE — 74177 CT ABD & PELVIS W/CONTRAST: CPT

## 2021-04-29 PROCEDURE — 96374 THER/PROPH/DIAG INJ IV PUSH: CPT

## 2021-04-29 RX ORDER — SODIUM CHLORIDE, SODIUM LACTATE, POTASSIUM CHLORIDE, CALCIUM CHLORIDE 600; 310; 30; 20 MG/100ML; MG/100ML; MG/100ML; MG/100ML
100 INJECTION, SOLUTION INTRAVENOUS CONTINUOUS
Status: DISCONTINUED | OUTPATIENT
Start: 2021-04-29 | End: 2021-05-02

## 2021-04-29 RX ORDER — INSULIN GLARGINE 100 [IU]/ML
40 INJECTION, SOLUTION SUBCUTANEOUS EVERY MORNING
Status: DISCONTINUED | OUTPATIENT
Start: 2021-04-30 | End: 2021-05-04 | Stop reason: HOSPADM

## 2021-04-29 RX ORDER — DEXTROSE MONOHYDRATE 25 G/50ML
25 INJECTION, SOLUTION INTRAVENOUS
Status: DISCONTINUED | OUTPATIENT
Start: 2021-04-29 | End: 2021-05-04 | Stop reason: HOSPADM

## 2021-04-29 RX ORDER — DEXTROSE AND SODIUM CHLORIDE 5; .45 G/100ML; G/100ML
75 INJECTION, SOLUTION INTRAVENOUS CONTINUOUS
Status: DISCONTINUED | OUTPATIENT
Start: 2021-04-29 | End: 2021-04-29

## 2021-04-29 RX ORDER — OXYCODONE HYDROCHLORIDE 5 MG/1
5 TABLET ORAL EVERY 8 HOURS PRN
Status: DISCONTINUED | OUTPATIENT
Start: 2021-04-29 | End: 2021-05-04 | Stop reason: HOSPADM

## 2021-04-29 RX ORDER — METOCLOPRAMIDE HYDROCHLORIDE 5 MG/ML
10 INJECTION INTRAMUSCULAR; INTRAVENOUS EVERY 6 HOURS
Status: DISCONTINUED | OUTPATIENT
Start: 2021-04-29 | End: 2021-05-02

## 2021-04-29 RX ORDER — HYDROMORPHONE HYDROCHLORIDE 1 MG/ML
0.5 INJECTION, SOLUTION INTRAMUSCULAR; INTRAVENOUS; SUBCUTANEOUS ONCE
Status: COMPLETED | OUTPATIENT
Start: 2021-04-29 | End: 2021-04-29

## 2021-04-29 RX ORDER — SCOLOPAMINE TRANSDERMAL SYSTEM 1 MG/1
1 PATCH, EXTENDED RELEASE TRANSDERMAL
Status: DISCONTINUED | OUTPATIENT
Start: 2021-04-29 | End: 2021-05-04 | Stop reason: HOSPADM

## 2021-04-29 RX ORDER — PROMETHAZINE HYDROCHLORIDE 25 MG/1
25 SUPPOSITORY RECTAL EVERY 4 HOURS PRN
Status: DISCONTINUED | OUTPATIENT
Start: 2021-04-29 | End: 2021-05-02

## 2021-04-29 RX ORDER — METOCLOPRAMIDE HYDROCHLORIDE 5 MG/ML
10 INJECTION INTRAMUSCULAR; INTRAVENOUS ONCE
Status: COMPLETED | OUTPATIENT
Start: 2021-04-29 | End: 2021-04-29

## 2021-04-29 RX ORDER — SODIUM CHLORIDE 0.9 % (FLUSH) 0.9 %
10 SYRINGE (ML) INJECTION AS NEEDED
Status: DISCONTINUED | OUTPATIENT
Start: 2021-04-29 | End: 2021-05-04 | Stop reason: HOSPADM

## 2021-04-29 RX ORDER — MIRTAZAPINE 15 MG/1
15 TABLET, FILM COATED ORAL NIGHTLY
Status: DISCONTINUED | OUTPATIENT
Start: 2021-04-29 | End: 2021-05-04 | Stop reason: HOSPADM

## 2021-04-29 RX ORDER — FERROUS SULFATE 325(65) MG
325 TABLET ORAL 2 TIMES DAILY
Status: DISCONTINUED | OUTPATIENT
Start: 2021-04-29 | End: 2021-05-02

## 2021-04-29 RX ORDER — SUCRALFATE 1 G/1
1 TABLET ORAL
Status: DISCONTINUED | OUTPATIENT
Start: 2021-04-29 | End: 2021-05-04 | Stop reason: HOSPADM

## 2021-04-29 RX ORDER — ALPRAZOLAM 0.25 MG/1
1 TABLET ORAL 2 TIMES DAILY PRN
Status: DISCONTINUED | OUTPATIENT
Start: 2021-04-29 | End: 2021-05-04 | Stop reason: HOSPADM

## 2021-04-29 RX ORDER — PREGABALIN 25 MG/1
75 CAPSULE ORAL EVERY 12 HOURS SCHEDULED
Status: DISCONTINUED | OUTPATIENT
Start: 2021-04-29 | End: 2021-05-04 | Stop reason: HOSPADM

## 2021-04-29 RX ORDER — NICOTINE POLACRILEX 4 MG
15 LOZENGE BUCCAL
Status: DISCONTINUED | OUTPATIENT
Start: 2021-04-29 | End: 2021-05-04 | Stop reason: HOSPADM

## 2021-04-29 RX ORDER — LEVETIRACETAM 500 MG/1
500 TABLET ORAL 2 TIMES DAILY
Status: DISCONTINUED | OUTPATIENT
Start: 2021-04-29 | End: 2021-05-04 | Stop reason: HOSPADM

## 2021-04-29 RX ORDER — FLUOXETINE 10 MG/1
10 CAPSULE ORAL DAILY
Status: DISCONTINUED | OUTPATIENT
Start: 2021-04-29 | End: 2021-05-04 | Stop reason: HOSPADM

## 2021-04-29 RX ORDER — HYDROXYZINE 50 MG/1
50 TABLET, FILM COATED ORAL NIGHTLY
Status: DISCONTINUED | OUTPATIENT
Start: 2021-04-29 | End: 2021-05-04 | Stop reason: HOSPADM

## 2021-04-29 RX ORDER — CHOLECALCIFEROL (VITAMIN D3) 125 MCG
1000 CAPSULE ORAL DAILY
Status: DISCONTINUED | OUTPATIENT
Start: 2021-04-29 | End: 2021-05-04 | Stop reason: HOSPADM

## 2021-04-29 RX ORDER — INSULIN LISPRO 100 [IU]/ML
0-9 INJECTION, SOLUTION INTRAVENOUS; SUBCUTANEOUS
Status: DISCONTINUED | OUTPATIENT
Start: 2021-04-29 | End: 2021-05-04 | Stop reason: HOSPADM

## 2021-04-29 RX ORDER — PANTOPRAZOLE SODIUM 40 MG/1
40 TABLET, DELAYED RELEASE ORAL EVERY MORNING
Status: DISCONTINUED | OUTPATIENT
Start: 2021-04-30 | End: 2021-05-02

## 2021-04-29 RX ORDER — LUBIPROSTONE 8 UG/1
8 CAPSULE ORAL 2 TIMES DAILY WITH MEALS
Status: DISCONTINUED | OUTPATIENT
Start: 2021-04-29 | End: 2021-05-04 | Stop reason: HOSPADM

## 2021-04-29 RX ADMIN — HYDROXYZINE HYDROCHLORIDE 50 MG: 50 TABLET ORAL at 20:23

## 2021-04-29 RX ADMIN — SCOPALAMINE 1 PATCH: 1 PATCH, EXTENDED RELEASE TRANSDERMAL at 12:35

## 2021-04-29 RX ADMIN — MIRTAZAPINE 15 MG: 15 TABLET, FILM COATED ORAL at 20:23

## 2021-04-29 RX ADMIN — SUCRALFATE 1 G: 1 TABLET ORAL at 17:50

## 2021-04-29 RX ADMIN — RIFAXIMIN 550 MG: 550 TABLET ORAL at 20:23

## 2021-04-29 RX ADMIN — METOCLOPRAMIDE HYDROCHLORIDE 10 MG: 5 INJECTION INTRAMUSCULAR; INTRAVENOUS at 15:21

## 2021-04-29 RX ADMIN — LEVETIRACETAM 500 MG: 500 TABLET, FILM COATED ORAL at 20:23

## 2021-04-29 RX ADMIN — SODIUM CHLORIDE, POTASSIUM CHLORIDE, SODIUM LACTATE AND CALCIUM CHLORIDE 100 ML/HR: 600; 310; 30; 20 INJECTION, SOLUTION INTRAVENOUS at 17:33

## 2021-04-29 RX ADMIN — OXYCODONE 5 MG: 5 TABLET ORAL at 16:10

## 2021-04-29 RX ADMIN — METOCLOPRAMIDE HYDROCHLORIDE 10 MG: 5 INJECTION INTRAMUSCULAR; INTRAVENOUS at 20:23

## 2021-04-29 RX ADMIN — FLUOXETINE HYDROCHLORIDE 10 MG: 10 CAPSULE ORAL at 17:50

## 2021-04-29 RX ADMIN — SODIUM CHLORIDE, PRESERVATIVE FREE 10 ML: 5 INJECTION INTRAVENOUS at 20:23

## 2021-04-29 RX ADMIN — LUBIPROSTONE 8 MCG: 8 CAPSULE, GELATIN COATED ORAL at 20:23

## 2021-04-29 RX ADMIN — PREGABALIN 75 MG: 25 CAPSULE ORAL at 20:23

## 2021-04-29 RX ADMIN — SODIUM CHLORIDE 1000 ML: 9 INJECTION, SOLUTION INTRAVENOUS at 06:38

## 2021-04-29 RX ADMIN — FERROUS SULFATE TAB 325 MG (65 MG ELEMENTAL FE) 325 MG: 325 (65 FE) TAB at 20:23

## 2021-04-29 RX ADMIN — HYDROMORPHONE HYDROCHLORIDE 0.5 MG: 1 INJECTION, SOLUTION INTRAMUSCULAR; INTRAVENOUS; SUBCUTANEOUS at 06:39

## 2021-04-29 RX ADMIN — METOCLOPRAMIDE HYDROCHLORIDE 10 MG: 5 INJECTION INTRAMUSCULAR; INTRAVENOUS at 06:39

## 2021-04-29 RX ADMIN — IOPAMIDOL 85 ML: 612 INJECTION, SOLUTION INTRAVENOUS at 07:37

## 2021-04-29 RX ADMIN — MAGNESIUM OXIDE 400 MG (241.3 MG MAGNESIUM) TABLET 400 MG: TABLET at 17:50

## 2021-04-29 RX ADMIN — Medication 1000 MCG: at 17:50

## 2021-04-30 LAB
ALBUMIN SERPL-MCNC: 3.2 G/DL (ref 3.5–5.2)
ALBUMIN/GLOB SERPL: 1.2 G/DL
ALP SERPL-CCNC: 129 U/L (ref 39–117)
ALT SERPL W P-5'-P-CCNC: 29 U/L (ref 1–33)
ANION GAP SERPL CALCULATED.3IONS-SCNC: 9.4 MMOL/L (ref 5–15)
AST SERPL-CCNC: 53 U/L (ref 1–32)
BILIRUB SERPL-MCNC: 1.8 MG/DL (ref 0–1.2)
BUN SERPL-MCNC: 20 MG/DL (ref 6–20)
BUN/CREAT SERPL: 19.4 (ref 7–25)
CALCIUM SPEC-SCNC: 8.2 MG/DL (ref 8.6–10.5)
CHLORIDE SERPL-SCNC: 103 MMOL/L (ref 98–107)
CO2 SERPL-SCNC: 21.6 MMOL/L (ref 22–29)
CREAT SERPL-MCNC: 1.03 MG/DL (ref 0.57–1)
DEPRECATED RDW RBC AUTO: 47.9 FL (ref 37–54)
ERYTHROCYTE [DISTWIDTH] IN BLOOD BY AUTOMATED COUNT: 15.4 % (ref 12.3–15.4)
GFR SERPL CREATININE-BSD FRML MDRD: 55 ML/MIN/1.73
GLOBULIN UR ELPH-MCNC: 2.6 GM/DL
GLUCOSE BLDC GLUCOMTR-MCNC: 164 MG/DL (ref 70–130)
GLUCOSE BLDC GLUCOMTR-MCNC: 187 MG/DL (ref 70–130)
GLUCOSE BLDC GLUCOMTR-MCNC: 288 MG/DL (ref 70–130)
GLUCOSE BLDC GLUCOMTR-MCNC: 316 MG/DL (ref 70–130)
GLUCOSE BLDC GLUCOMTR-MCNC: 358 MG/DL (ref 70–130)
GLUCOSE SERPL-MCNC: 305 MG/DL (ref 65–99)
HBA1C MFR BLD: 8.7 % (ref 4.8–5.6)
HCT VFR BLD AUTO: 31.8 % (ref 34–46.6)
HGB BLD-MCNC: 10.7 G/DL (ref 12–15.9)
MCH RBC QN AUTO: 28.9 PG (ref 26.6–33)
MCHC RBC AUTO-ENTMCNC: 33.6 G/DL (ref 31.5–35.7)
MCV RBC AUTO: 85.9 FL (ref 79–97)
PLATELET # BLD AUTO: 82 10*3/MM3 (ref 140–450)
PMV BLD AUTO: 10.3 FL (ref 6–12)
POTASSIUM SERPL-SCNC: 3.8 MMOL/L (ref 3.5–5.2)
PROT SERPL-MCNC: 5.8 G/DL (ref 6–8.5)
QT INTERVAL: 408 MS
RBC # BLD AUTO: 3.7 10*6/MM3 (ref 3.77–5.28)
SODIUM SERPL-SCNC: 134 MMOL/L (ref 136–145)
WBC # BLD AUTO: 3.21 10*3/MM3 (ref 3.4–10.8)

## 2021-04-30 PROCEDURE — 83036 HEMOGLOBIN GLYCOSYLATED A1C: CPT | Performed by: NURSE PRACTITIONER

## 2021-04-30 PROCEDURE — 99213 OFFICE O/P EST LOW 20 MIN: CPT | Performed by: INTERNAL MEDICINE

## 2021-04-30 PROCEDURE — 63710000001 INSULIN LISPRO (HUMAN) PER 5 UNITS: Performed by: NURSE PRACTITIONER

## 2021-04-30 PROCEDURE — G0378 HOSPITAL OBSERVATION PER HR: HCPCS

## 2021-04-30 PROCEDURE — 96376 TX/PRO/DX INJ SAME DRUG ADON: CPT

## 2021-04-30 PROCEDURE — 82962 GLUCOSE BLOOD TEST: CPT

## 2021-04-30 PROCEDURE — 63710000001 INSULIN GLARGINE PER 5 UNITS: Performed by: NURSE PRACTITIONER

## 2021-04-30 PROCEDURE — 85027 COMPLETE CBC AUTOMATED: CPT | Performed by: STUDENT IN AN ORGANIZED HEALTH CARE EDUCATION/TRAINING PROGRAM

## 2021-04-30 PROCEDURE — 63710000001 PROMETHAZINE PER 25 MG: Performed by: STUDENT IN AN ORGANIZED HEALTH CARE EDUCATION/TRAINING PROGRAM

## 2021-04-30 PROCEDURE — 25010000002 METOCLOPRAMIDE PER 10 MG: Performed by: NURSE PRACTITIONER

## 2021-04-30 PROCEDURE — 80053 COMPREHEN METABOLIC PANEL: CPT | Performed by: STUDENT IN AN ORGANIZED HEALTH CARE EDUCATION/TRAINING PROGRAM

## 2021-04-30 PROCEDURE — 36415 COLL VENOUS BLD VENIPUNCTURE: CPT | Performed by: NURSE PRACTITIONER

## 2021-04-30 PROCEDURE — 96361 HYDRATE IV INFUSION ADD-ON: CPT

## 2021-04-30 RX ORDER — PROMETHAZINE HYDROCHLORIDE 25 MG/1
25 TABLET ORAL EVERY 8 HOURS PRN
Status: DISCONTINUED | OUTPATIENT
Start: 2021-04-30 | End: 2021-05-02

## 2021-04-30 RX ADMIN — Medication 1000 MCG: at 09:38

## 2021-04-30 RX ADMIN — RIFAXIMIN 550 MG: 550 TABLET ORAL at 22:39

## 2021-04-30 RX ADMIN — LEVETIRACETAM 500 MG: 500 TABLET, FILM COATED ORAL at 22:39

## 2021-04-30 RX ADMIN — SODIUM CHLORIDE, POTASSIUM CHLORIDE, SODIUM LACTATE AND CALCIUM CHLORIDE 100 ML/HR: 600; 310; 30; 20 INJECTION, SOLUTION INTRAVENOUS at 21:58

## 2021-04-30 RX ADMIN — METOCLOPRAMIDE HYDROCHLORIDE 10 MG: 5 INJECTION INTRAMUSCULAR; INTRAVENOUS at 03:43

## 2021-04-30 RX ADMIN — RIFAXIMIN 550 MG: 550 TABLET ORAL at 09:38

## 2021-04-30 RX ADMIN — METOCLOPRAMIDE HYDROCHLORIDE 10 MG: 5 INJECTION INTRAMUSCULAR; INTRAVENOUS at 23:31

## 2021-04-30 RX ADMIN — OXYCODONE 5 MG: 5 TABLET ORAL at 23:35

## 2021-04-30 RX ADMIN — SODIUM CHLORIDE, POTASSIUM CHLORIDE, SODIUM LACTATE AND CALCIUM CHLORIDE 100 ML/HR: 600; 310; 30; 20 INJECTION, SOLUTION INTRAVENOUS at 03:43

## 2021-04-30 RX ADMIN — FERROUS SULFATE TAB 325 MG (65 MG ELEMENTAL FE) 325 MG: 325 (65 FE) TAB at 22:39

## 2021-04-30 RX ADMIN — PROMETHAZINE HYDROCHLORIDE 25 MG: 25 TABLET ORAL at 23:34

## 2021-04-30 RX ADMIN — HYDROXYZINE HYDROCHLORIDE 50 MG: 50 TABLET ORAL at 22:40

## 2021-04-30 RX ADMIN — PREGABALIN 75 MG: 25 CAPSULE ORAL at 22:39

## 2021-04-30 RX ADMIN — SODIUM CHLORIDE, POTASSIUM CHLORIDE, SODIUM LACTATE AND CALCIUM CHLORIDE 100 ML/HR: 600; 310; 30; 20 INJECTION, SOLUTION INTRAVENOUS at 11:44

## 2021-04-30 RX ADMIN — LUBIPROSTONE 8 MCG: 8 CAPSULE, GELATIN COATED ORAL at 09:38

## 2021-04-30 RX ADMIN — METOCLOPRAMIDE HYDROCHLORIDE 10 MG: 5 INJECTION INTRAMUSCULAR; INTRAVENOUS at 18:08

## 2021-04-30 RX ADMIN — METOCLOPRAMIDE HYDROCHLORIDE 10 MG: 5 INJECTION INTRAMUSCULAR; INTRAVENOUS at 11:43

## 2021-04-30 RX ADMIN — OXYCODONE 5 MG: 5 TABLET ORAL at 11:43

## 2021-04-30 RX ADMIN — FERROUS SULFATE TAB 325 MG (65 MG ELEMENTAL FE) 325 MG: 325 (65 FE) TAB at 09:38

## 2021-04-30 RX ADMIN — PROMETHAZINE HYDROCHLORIDE 25 MG: 25 TABLET ORAL at 11:54

## 2021-04-30 RX ADMIN — FLUOXETINE HYDROCHLORIDE 10 MG: 10 CAPSULE ORAL at 09:37

## 2021-04-30 RX ADMIN — SUCRALFATE 1 G: 1 TABLET ORAL at 06:38

## 2021-04-30 RX ADMIN — MAGNESIUM OXIDE 400 MG (241.3 MG MAGNESIUM) TABLET 400 MG: TABLET at 09:38

## 2021-04-30 RX ADMIN — LEVETIRACETAM 500 MG: 500 TABLET, FILM COATED ORAL at 09:38

## 2021-04-30 RX ADMIN — PANTOPRAZOLE SODIUM 40 MG: 40 TABLET, DELAYED RELEASE ORAL at 06:38

## 2021-04-30 RX ADMIN — PREGABALIN 75 MG: 25 CAPSULE ORAL at 09:37

## 2021-04-30 RX ADMIN — INSULIN LISPRO 2 UNITS: 100 INJECTION, SOLUTION INTRAVENOUS; SUBCUTANEOUS at 18:08

## 2021-04-30 RX ADMIN — INSULIN LISPRO 2 UNITS: 100 INJECTION, SOLUTION INTRAVENOUS; SUBCUTANEOUS at 22:40

## 2021-04-30 RX ADMIN — INSULIN GLARGINE 40 UNITS: 100 INJECTION, SOLUTION SUBCUTANEOUS at 06:38

## 2021-04-30 RX ADMIN — INSULIN LISPRO 6 UNITS: 100 INJECTION, SOLUTION INTRAVENOUS; SUBCUTANEOUS at 11:43

## 2021-04-30 RX ADMIN — SODIUM CHLORIDE, PRESERVATIVE FREE 10 ML: 5 INJECTION INTRAVENOUS at 22:40

## 2021-04-30 RX ADMIN — INSULIN LISPRO 6 UNITS: 100 INJECTION, SOLUTION INTRAVENOUS; SUBCUTANEOUS at 09:47

## 2021-04-30 RX ADMIN — LUBIPROSTONE 8 MCG: 8 CAPSULE, GELATIN COATED ORAL at 18:08

## 2021-05-01 LAB
ANION GAP SERPL CALCULATED.3IONS-SCNC: 11.8 MMOL/L (ref 5–15)
BUN SERPL-MCNC: 16 MG/DL (ref 6–20)
BUN/CREAT SERPL: 18.2 (ref 7–25)
CALCIUM SPEC-SCNC: 8.1 MG/DL (ref 8.6–10.5)
CHLORIDE SERPL-SCNC: 105 MMOL/L (ref 98–107)
CO2 SERPL-SCNC: 22.2 MMOL/L (ref 22–29)
CREAT SERPL-MCNC: 0.88 MG/DL (ref 0.57–1)
DEPRECATED RDW RBC AUTO: 47.1 FL (ref 37–54)
ERYTHROCYTE [DISTWIDTH] IN BLOOD BY AUTOMATED COUNT: 15.6 % (ref 12.3–15.4)
GFR SERPL CREATININE-BSD FRML MDRD: 66 ML/MIN/1.73
GLUCOSE BLDC GLUCOMTR-MCNC: 162 MG/DL (ref 70–130)
GLUCOSE BLDC GLUCOMTR-MCNC: 189 MG/DL (ref 70–130)
GLUCOSE BLDC GLUCOMTR-MCNC: 235 MG/DL (ref 70–130)
GLUCOSE BLDC GLUCOMTR-MCNC: 94 MG/DL (ref 70–130)
GLUCOSE SERPL-MCNC: 201 MG/DL (ref 65–99)
HCT VFR BLD AUTO: 27.9 % (ref 34–46.6)
HGB BLD-MCNC: 9.6 G/DL (ref 12–15.9)
MCH RBC QN AUTO: 29.4 PG (ref 26.6–33)
MCHC RBC AUTO-ENTMCNC: 34.4 G/DL (ref 31.5–35.7)
MCV RBC AUTO: 85.3 FL (ref 79–97)
PLATELET # BLD AUTO: 62 10*3/MM3 (ref 140–450)
PMV BLD AUTO: 9.4 FL (ref 6–12)
POTASSIUM SERPL-SCNC: 3.6 MMOL/L (ref 3.5–5.2)
RBC # BLD AUTO: 3.27 10*6/MM3 (ref 3.77–5.28)
SODIUM SERPL-SCNC: 139 MMOL/L (ref 136–145)
WBC # BLD AUTO: 2.74 10*3/MM3 (ref 3.4–10.8)

## 2021-05-01 PROCEDURE — 63710000001 INSULIN GLARGINE PER 5 UNITS: Performed by: NURSE PRACTITIONER

## 2021-05-01 PROCEDURE — 99214 OFFICE O/P EST MOD 30 MIN: CPT | Performed by: INTERNAL MEDICINE

## 2021-05-01 PROCEDURE — 80048 BASIC METABOLIC PNL TOTAL CA: CPT | Performed by: STUDENT IN AN ORGANIZED HEALTH CARE EDUCATION/TRAINING PROGRAM

## 2021-05-01 PROCEDURE — G0378 HOSPITAL OBSERVATION PER HR: HCPCS

## 2021-05-01 PROCEDURE — 96361 HYDRATE IV INFUSION ADD-ON: CPT

## 2021-05-01 PROCEDURE — 85027 COMPLETE CBC AUTOMATED: CPT | Performed by: STUDENT IN AN ORGANIZED HEALTH CARE EDUCATION/TRAINING PROGRAM

## 2021-05-01 PROCEDURE — 25010000002 METOCLOPRAMIDE PER 10 MG: Performed by: NURSE PRACTITIONER

## 2021-05-01 PROCEDURE — 96376 TX/PRO/DX INJ SAME DRUG ADON: CPT

## 2021-05-01 PROCEDURE — 63710000001 INSULIN LISPRO (HUMAN) PER 5 UNITS: Performed by: NURSE PRACTITIONER

## 2021-05-01 PROCEDURE — 51798 US URINE CAPACITY MEASURE: CPT

## 2021-05-01 PROCEDURE — 82962 GLUCOSE BLOOD TEST: CPT

## 2021-05-01 PROCEDURE — 63710000001 PROMETHAZINE PER 25 MG: Performed by: STUDENT IN AN ORGANIZED HEALTH CARE EDUCATION/TRAINING PROGRAM

## 2021-05-01 RX ADMIN — PROMETHAZINE HYDROCHLORIDE 25 MG: 25 TABLET ORAL at 21:28

## 2021-05-01 RX ADMIN — LUBIPROSTONE 8 MCG: 8 CAPSULE, GELATIN COATED ORAL at 17:09

## 2021-05-01 RX ADMIN — SUCRALFATE 1 G: 1 TABLET ORAL at 17:09

## 2021-05-01 RX ADMIN — METOCLOPRAMIDE HYDROCHLORIDE 10 MG: 5 INJECTION INTRAMUSCULAR; INTRAVENOUS at 07:07

## 2021-05-01 RX ADMIN — LUBIPROSTONE 8 MCG: 8 CAPSULE, GELATIN COATED ORAL at 09:22

## 2021-05-01 RX ADMIN — OXYCODONE 5 MG: 5 TABLET ORAL at 21:28

## 2021-05-01 RX ADMIN — OXYCODONE 5 MG: 5 TABLET ORAL at 11:07

## 2021-05-01 RX ADMIN — FERROUS SULFATE TAB 325 MG (65 MG ELEMENTAL FE) 325 MG: 325 (65 FE) TAB at 09:23

## 2021-05-01 RX ADMIN — ALPRAZOLAM 1 MG: 0.25 TABLET ORAL at 01:06

## 2021-05-01 RX ADMIN — SODIUM CHLORIDE, POTASSIUM CHLORIDE, SODIUM LACTATE AND CALCIUM CHLORIDE 100 ML/HR: 600; 310; 30; 20 INJECTION, SOLUTION INTRAVENOUS at 20:27

## 2021-05-01 RX ADMIN — SODIUM CHLORIDE, POTASSIUM CHLORIDE, SODIUM LACTATE AND CALCIUM CHLORIDE 100 ML/HR: 600; 310; 30; 20 INJECTION, SOLUTION INTRAVENOUS at 12:42

## 2021-05-01 RX ADMIN — FLUOXETINE HYDROCHLORIDE 10 MG: 10 CAPSULE ORAL at 09:22

## 2021-05-01 RX ADMIN — INSULIN LISPRO 4 UNITS: 100 INJECTION, SOLUTION INTRAVENOUS; SUBCUTANEOUS at 09:22

## 2021-05-01 RX ADMIN — SUCRALFATE 1 G: 1 TABLET ORAL at 09:23

## 2021-05-01 RX ADMIN — RIFAXIMIN 550 MG: 550 TABLET ORAL at 09:22

## 2021-05-01 RX ADMIN — METOCLOPRAMIDE HYDROCHLORIDE 10 MG: 5 INJECTION INTRAMUSCULAR; INTRAVENOUS at 17:09

## 2021-05-01 RX ADMIN — PROMETHAZINE HYDROCHLORIDE 25 MG: 25 TABLET ORAL at 11:07

## 2021-05-01 RX ADMIN — RIFAXIMIN 550 MG: 550 TABLET ORAL at 20:26

## 2021-05-01 RX ADMIN — SUCRALFATE 1 G: 1 TABLET ORAL at 12:41

## 2021-05-01 RX ADMIN — LEVETIRACETAM 500 MG: 500 TABLET, FILM COATED ORAL at 20:27

## 2021-05-01 RX ADMIN — MAGNESIUM OXIDE 400 MG (241.3 MG MAGNESIUM) TABLET 400 MG: TABLET at 09:23

## 2021-05-01 RX ADMIN — INSULIN LISPRO 2 UNITS: 100 INJECTION, SOLUTION INTRAVENOUS; SUBCUTANEOUS at 12:40

## 2021-05-01 RX ADMIN — PANTOPRAZOLE SODIUM 40 MG: 40 TABLET, DELAYED RELEASE ORAL at 09:23

## 2021-05-01 RX ADMIN — INSULIN LISPRO 2 UNITS: 100 INJECTION, SOLUTION INTRAVENOUS; SUBCUTANEOUS at 21:28

## 2021-05-01 RX ADMIN — MIRTAZAPINE 15 MG: 15 TABLET, FILM COATED ORAL at 20:26

## 2021-05-01 RX ADMIN — FERROUS SULFATE TAB 325 MG (65 MG ELEMENTAL FE) 325 MG: 325 (65 FE) TAB at 20:26

## 2021-05-01 RX ADMIN — INSULIN GLARGINE 40 UNITS: 100 INJECTION, SOLUTION SUBCUTANEOUS at 09:20

## 2021-05-01 RX ADMIN — METOCLOPRAMIDE HYDROCHLORIDE 10 MG: 5 INJECTION INTRAMUSCULAR; INTRAVENOUS at 12:41

## 2021-05-01 RX ADMIN — PREGABALIN 75 MG: 25 CAPSULE ORAL at 09:23

## 2021-05-01 RX ADMIN — LEVETIRACETAM 500 MG: 500 TABLET, FILM COATED ORAL at 09:22

## 2021-05-01 RX ADMIN — HYDROXYZINE HYDROCHLORIDE 50 MG: 50 TABLET ORAL at 20:26

## 2021-05-01 RX ADMIN — Medication 1000 MCG: at 09:22

## 2021-05-01 RX ADMIN — MIRTAZAPINE 15 MG: 15 TABLET, FILM COATED ORAL at 01:06

## 2021-05-01 RX ADMIN — PREGABALIN 75 MG: 25 CAPSULE ORAL at 20:35

## 2021-05-02 LAB
ALBUMIN SERPL-MCNC: 2.8 G/DL (ref 3.5–5.2)
ALBUMIN/GLOB SERPL: 1.2 G/DL
ALP SERPL-CCNC: 103 U/L (ref 39–117)
ALT SERPL W P-5'-P-CCNC: 23 U/L (ref 1–33)
ANION GAP SERPL CALCULATED.3IONS-SCNC: 8.7 MMOL/L (ref 5–15)
AST SERPL-CCNC: 43 U/L (ref 1–32)
BILIRUB SERPL-MCNC: 1.3 MG/DL (ref 0–1.2)
BUN SERPL-MCNC: 11 MG/DL (ref 6–20)
BUN/CREAT SERPL: 12.4 (ref 7–25)
C DIFF TOX GENS STL QL NAA+PROBE: NEGATIVE
CALCIUM SPEC-SCNC: 7.9 MG/DL (ref 8.6–10.5)
CHLORIDE SERPL-SCNC: 109 MMOL/L (ref 98–107)
CO2 SERPL-SCNC: 22.3 MMOL/L (ref 22–29)
CREAT SERPL-MCNC: 0.89 MG/DL (ref 0.57–1)
DEPRECATED RDW RBC AUTO: 47.9 FL (ref 37–54)
ERYTHROCYTE [DISTWIDTH] IN BLOOD BY AUTOMATED COUNT: 15.5 % (ref 12.3–15.4)
GFR SERPL CREATININE-BSD FRML MDRD: 65 ML/MIN/1.73
GLOBULIN UR ELPH-MCNC: 2.4 GM/DL
GLUCOSE BLDC GLUCOMTR-MCNC: 146 MG/DL (ref 70–130)
GLUCOSE BLDC GLUCOMTR-MCNC: 361 MG/DL (ref 70–130)
GLUCOSE BLDC GLUCOMTR-MCNC: 424 MG/DL (ref 70–130)
GLUCOSE BLDC GLUCOMTR-MCNC: 85 MG/DL (ref 70–130)
GLUCOSE SERPL-MCNC: 79 MG/DL (ref 65–99)
HCT VFR BLD AUTO: 26.9 % (ref 34–46.6)
HGB BLD-MCNC: 9.4 G/DL (ref 12–15.9)
MCH RBC QN AUTO: 29.8 PG (ref 26.6–33)
MCHC RBC AUTO-ENTMCNC: 34.9 G/DL (ref 31.5–35.7)
MCV RBC AUTO: 85.4 FL (ref 79–97)
PLATELET # BLD AUTO: 57 10*3/MM3 (ref 140–450)
PMV BLD AUTO: 10.4 FL (ref 6–12)
POTASSIUM SERPL-SCNC: 3.4 MMOL/L (ref 3.5–5.2)
PROT SERPL-MCNC: 5.2 G/DL (ref 6–8.5)
RBC # BLD AUTO: 3.15 10*6/MM3 (ref 3.77–5.28)
SODIUM SERPL-SCNC: 140 MMOL/L (ref 136–145)
WBC # BLD AUTO: 2 10*3/MM3 (ref 3.4–10.8)

## 2021-05-02 PROCEDURE — 99214 OFFICE O/P EST MOD 30 MIN: CPT | Performed by: INTERNAL MEDICINE

## 2021-05-02 PROCEDURE — 82962 GLUCOSE BLOOD TEST: CPT

## 2021-05-02 PROCEDURE — 96361 HYDRATE IV INFUSION ADD-ON: CPT

## 2021-05-02 PROCEDURE — 80053 COMPREHEN METABOLIC PANEL: CPT | Performed by: STUDENT IN AN ORGANIZED HEALTH CARE EDUCATION/TRAINING PROGRAM

## 2021-05-02 PROCEDURE — 87493 C DIFF AMPLIFIED PROBE: CPT | Performed by: INTERNAL MEDICINE

## 2021-05-02 PROCEDURE — G0378 HOSPITAL OBSERVATION PER HR: HCPCS

## 2021-05-02 PROCEDURE — 25010000002 METOCLOPRAMIDE PER 10 MG: Performed by: NURSE PRACTITIONER

## 2021-05-02 PROCEDURE — 63710000001 INSULIN LISPRO (HUMAN) PER 5 UNITS: Performed by: NURSE PRACTITIONER

## 2021-05-02 PROCEDURE — 63710000001 INSULIN GLARGINE PER 5 UNITS: Performed by: NURSE PRACTITIONER

## 2021-05-02 PROCEDURE — 85027 COMPLETE CBC AUTOMATED: CPT | Performed by: STUDENT IN AN ORGANIZED HEALTH CARE EDUCATION/TRAINING PROGRAM

## 2021-05-02 PROCEDURE — 96376 TX/PRO/DX INJ SAME DRUG ADON: CPT

## 2021-05-02 RX ORDER — POTASSIUM CHLORIDE 1.5 G/1.77G
40 POWDER, FOR SOLUTION ORAL AS NEEDED
Status: DISCONTINUED | OUTPATIENT
Start: 2021-05-02 | End: 2021-05-04 | Stop reason: HOSPADM

## 2021-05-02 RX ORDER — POTASSIUM CHLORIDE 750 MG/1
40 TABLET, FILM COATED, EXTENDED RELEASE ORAL AS NEEDED
Status: DISCONTINUED | OUTPATIENT
Start: 2021-05-02 | End: 2021-05-04 | Stop reason: HOSPADM

## 2021-05-02 RX ORDER — POTASSIUM CHLORIDE 7.45 MG/ML
10 INJECTION INTRAVENOUS
Status: DISCONTINUED | OUTPATIENT
Start: 2021-05-02 | End: 2021-05-04 | Stop reason: HOSPADM

## 2021-05-02 RX ORDER — FAMOTIDINE 20 MG/1
20 TABLET, FILM COATED ORAL
Status: DISCONTINUED | OUTPATIENT
Start: 2021-05-02 | End: 2021-05-04 | Stop reason: HOSPADM

## 2021-05-02 RX ORDER — METOCLOPRAMIDE 10 MG/1
10 TABLET ORAL
Status: DISCONTINUED | OUTPATIENT
Start: 2021-05-02 | End: 2021-05-04 | Stop reason: HOSPADM

## 2021-05-02 RX ADMIN — POTASSIUM CHLORIDE 40 MEQ: 750 TABLET, EXTENDED RELEASE ORAL at 13:46

## 2021-05-02 RX ADMIN — HYDROXYZINE HYDROCHLORIDE 50 MG: 50 TABLET ORAL at 22:08

## 2021-05-02 RX ADMIN — METOCLOPRAMIDE HYDROCHLORIDE 10 MG: 5 INJECTION INTRAMUSCULAR; INTRAVENOUS at 12:10

## 2021-05-02 RX ADMIN — INSULIN LISPRO 9 UNITS: 100 INJECTION, SOLUTION INTRAVENOUS; SUBCUTANEOUS at 22:09

## 2021-05-02 RX ADMIN — OXYCODONE 5 MG: 5 TABLET ORAL at 18:16

## 2021-05-02 RX ADMIN — POTASSIUM CHLORIDE 40 MEQ: 750 TABLET, EXTENDED RELEASE ORAL at 17:49

## 2021-05-02 RX ADMIN — INSULIN LISPRO 8 UNITS: 100 INJECTION, SOLUTION INTRAVENOUS; SUBCUTANEOUS at 17:22

## 2021-05-02 RX ADMIN — METOCLOPRAMIDE HYDROCHLORIDE 10 MG: 5 INJECTION INTRAMUSCULAR; INTRAVENOUS at 07:19

## 2021-05-02 RX ADMIN — INSULIN GLARGINE 40 UNITS: 100 INJECTION, SOLUTION SUBCUTANEOUS at 09:26

## 2021-05-02 RX ADMIN — SUCRALFATE 1 G: 1 TABLET ORAL at 09:25

## 2021-05-02 RX ADMIN — FAMOTIDINE 20 MG: 20 TABLET, FILM COATED ORAL at 17:53

## 2021-05-02 RX ADMIN — METOCLOPRAMIDE 10 MG: 10 TABLET ORAL at 17:22

## 2021-05-02 RX ADMIN — SODIUM CHLORIDE, POTASSIUM CHLORIDE, SODIUM LACTATE AND CALCIUM CHLORIDE 100 ML/HR: 600; 310; 30; 20 INJECTION, SOLUTION INTRAVENOUS at 09:26

## 2021-05-02 RX ADMIN — FLUOXETINE HYDROCHLORIDE 10 MG: 10 CAPSULE ORAL at 09:25

## 2021-05-02 RX ADMIN — ALPRAZOLAM 1 MG: 0.25 TABLET ORAL at 00:50

## 2021-05-02 RX ADMIN — SCOPALAMINE 1 PATCH: 1 PATCH, EXTENDED RELEASE TRANSDERMAL at 12:09

## 2021-05-02 RX ADMIN — SUCRALFATE 1 G: 1 TABLET ORAL at 17:22

## 2021-05-02 RX ADMIN — SODIUM CHLORIDE, POTASSIUM CHLORIDE, SODIUM LACTATE AND CALCIUM CHLORIDE 100 ML/HR: 600; 310; 30; 20 INJECTION, SOLUTION INTRAVENOUS at 09:33

## 2021-05-02 RX ADMIN — LUBIPROSTONE 8 MCG: 8 CAPSULE, GELATIN COATED ORAL at 09:25

## 2021-05-02 RX ADMIN — LEVETIRACETAM 500 MG: 500 TABLET, FILM COATED ORAL at 22:09

## 2021-05-02 RX ADMIN — PREGABALIN 75 MG: 25 CAPSULE ORAL at 09:26

## 2021-05-02 RX ADMIN — PREGABALIN 75 MG: 25 CAPSULE ORAL at 22:08

## 2021-05-02 RX ADMIN — ALPRAZOLAM 1 MG: 0.25 TABLET ORAL at 22:18

## 2021-05-02 RX ADMIN — MIRTAZAPINE 15 MG: 15 TABLET, FILM COATED ORAL at 22:08

## 2021-05-02 RX ADMIN — METOCLOPRAMIDE HYDROCHLORIDE 10 MG: 5 INJECTION INTRAMUSCULAR; INTRAVENOUS at 01:07

## 2021-05-02 RX ADMIN — Medication 1000 MCG: at 09:25

## 2021-05-02 RX ADMIN — LEVETIRACETAM 500 MG: 500 TABLET, FILM COATED ORAL at 09:30

## 2021-05-02 RX ADMIN — LUBIPROSTONE 8 MCG: 8 CAPSULE, GELATIN COATED ORAL at 17:22

## 2021-05-02 RX ADMIN — FERROUS SULFATE TAB 325 MG (65 MG ELEMENTAL FE) 325 MG: 325 (65 FE) TAB at 09:25

## 2021-05-02 RX ADMIN — PANTOPRAZOLE SODIUM 40 MG: 40 TABLET, DELAYED RELEASE ORAL at 07:20

## 2021-05-02 RX ADMIN — SUCRALFATE 1 G: 1 TABLET ORAL at 12:09

## 2021-05-02 RX ADMIN — MAGNESIUM OXIDE 400 MG (241.3 MG MAGNESIUM) TABLET 400 MG: TABLET at 09:25

## 2021-05-02 RX ADMIN — METOCLOPRAMIDE 10 MG: 10 TABLET ORAL at 22:08

## 2021-05-03 ENCOUNTER — APPOINTMENT (OUTPATIENT)
Dept: VACCINE CLINIC | Facility: HOSPITAL | Age: 61
End: 2021-05-03

## 2021-05-03 PROBLEM — R79.89 ELEVATED PROCALCITONIN: Status: RESOLVED | Noted: 2021-04-02 | Resolved: 2021-05-03

## 2021-05-03 PROBLEM — M32.9 SYSTEMIC LUPUS (HCC): Status: RESOLVED | Noted: 2017-02-22 | Resolved: 2021-05-03

## 2021-05-03 PROBLEM — R11.2 INTRACTABLE VOMITING WITH NAUSEA: Status: RESOLVED | Noted: 2021-04-29 | Resolved: 2021-05-03

## 2021-05-03 PROBLEM — E86.0 DEHYDRATION: Status: RESOLVED | Noted: 2019-01-17 | Resolved: 2021-05-03

## 2021-05-03 PROBLEM — R56.9 SEIZURE (HCC): Status: RESOLVED | Noted: 2018-05-30 | Resolved: 2021-05-03

## 2021-05-03 LAB
ALBUMIN SERPL-MCNC: 2.9 G/DL (ref 3.5–5.2)
ALBUMIN/GLOB SERPL: 1.3 G/DL
ALP SERPL-CCNC: 112 U/L (ref 39–117)
ALT SERPL W P-5'-P-CCNC: 24 U/L (ref 1–33)
ANION GAP SERPL CALCULATED.3IONS-SCNC: 8.3 MMOL/L (ref 5–15)
APTT PPP: 31.9 SECONDS (ref 22.7–35.4)
AST SERPL-CCNC: 41 U/L (ref 1–32)
BILIRUB SERPL-MCNC: 1 MG/DL (ref 0–1.2)
BUN SERPL-MCNC: 10 MG/DL (ref 6–20)
BUN/CREAT SERPL: 12.5 (ref 7–25)
CALCIUM SPEC-SCNC: 8 MG/DL (ref 8.6–10.5)
CERULOPLASMIN SERPL-MCNC: 27 MG/DL (ref 19–39)
CHLORIDE SERPL-SCNC: 111 MMOL/L (ref 98–107)
CO2 SERPL-SCNC: 20.7 MMOL/L (ref 22–29)
CREAT SERPL-MCNC: 0.8 MG/DL (ref 0.57–1)
CRP SERPL-MCNC: 0.5 MG/DL (ref 0–0.5)
DEPRECATED RDW RBC AUTO: 50 FL (ref 37–54)
ERYTHROCYTE [DISTWIDTH] IN BLOOD BY AUTOMATED COUNT: 15.9 % (ref 12.3–15.4)
ERYTHROCYTE [SEDIMENTATION RATE] IN BLOOD: 5 MM/HR (ref 0–30)
FERRITIN SERPL-MCNC: 155 NG/ML (ref 13–150)
FIBRINOGEN PPP-MCNC: 271 MG/DL (ref 219–464)
FOLATE SERPL-MCNC: 14.7 NG/ML (ref 4.78–24.2)
GFR SERPL CREATININE-BSD FRML MDRD: 74 ML/MIN/1.73
GLOBULIN UR ELPH-MCNC: 2.2 GM/DL
GLUCOSE BLDC GLUCOMTR-MCNC: 210 MG/DL (ref 70–130)
GLUCOSE BLDC GLUCOMTR-MCNC: 336 MG/DL (ref 70–130)
GLUCOSE BLDC GLUCOMTR-MCNC: 391 MG/DL (ref 70–130)
GLUCOSE BLDC GLUCOMTR-MCNC: 408 MG/DL (ref 70–130)
GLUCOSE BLDC GLUCOMTR-MCNC: 464 MG/DL (ref 70–130)
GLUCOSE SERPL-MCNC: 210 MG/DL (ref 65–99)
HCT VFR BLD AUTO: 29.3 % (ref 34–46.6)
HGB BLD-MCNC: 10.1 G/DL (ref 12–15.9)
HGB RETIC QN AUTO: 35.4 PG (ref 29.8–36.1)
IGA1 MFR SER: 233 MG/DL (ref 70–400)
IGG1 SER-MCNC: 889 MG/DL (ref 700–1600)
IGM SERPL-MCNC: 168 MG/DL (ref 40–230)
IMM RETICS NFR: 12.1 % (ref 3–15.8)
INR PPP: 1.54 (ref 0.9–1.1)
IRON 24H UR-MRATE: 52 MCG/DL (ref 37–145)
IRON SATN MFR SERPL: 13 % (ref 20–50)
LDH SERPL-CCNC: 263 U/L (ref 135–214)
MCH RBC QN AUTO: 30.3 PG (ref 26.6–33)
MCHC RBC AUTO-ENTMCNC: 34.5 G/DL (ref 31.5–35.7)
MCV RBC AUTO: 88 FL (ref 79–97)
PLATELET # BLD AUTO: 51 10*3/MM3 (ref 140–450)
PLATELET # BLD AUTO: 52 10*3/MM3 (ref 140–450)
PLATELETS.RETICULATED NFR BLD AUTO: 5.6 % (ref 0.9–6.5)
PMV BLD AUTO: 11.7 FL (ref 6–12)
POTASSIUM SERPL-SCNC: 4.3 MMOL/L (ref 3.5–5.2)
PROT SERPL-MCNC: 5.1 G/DL (ref 6–8.5)
PROTHROMBIN TIME: 18.2 SECONDS (ref 11.7–14.2)
RBC # BLD AUTO: 3.33 10*6/MM3 (ref 3.77–5.28)
RETICS # AUTO: 0.16 10*6/MM3 (ref 0.02–0.13)
RETICS/RBC NFR AUTO: 4.78 % (ref 0.7–1.9)
SODIUM SERPL-SCNC: 140 MMOL/L (ref 136–145)
TIBC SERPL-MCNC: 407 MCG/DL (ref 298–536)
TRANSFERRIN SERPL-MCNC: 273 MG/DL (ref 200–360)
VIT B12 BLD-MCNC: >2000 PG/ML (ref 211–946)
WBC # BLD AUTO: 1.89 10*3/MM3 (ref 3.4–10.8)

## 2021-05-03 PROCEDURE — 84466 ASSAY OF TRANSFERRIN: CPT | Performed by: INTERNAL MEDICINE

## 2021-05-03 PROCEDURE — 84630 ASSAY OF ZINC: CPT | Performed by: INTERNAL MEDICINE

## 2021-05-03 PROCEDURE — G0378 HOSPITAL OBSERVATION PER HR: HCPCS

## 2021-05-03 PROCEDURE — 82728 ASSAY OF FERRITIN: CPT | Performed by: INTERNAL MEDICINE

## 2021-05-03 PROCEDURE — 86140 C-REACTIVE PROTEIN: CPT | Performed by: INTERNAL MEDICINE

## 2021-05-03 PROCEDURE — 85384 FIBRINOGEN ACTIVITY: CPT | Performed by: INTERNAL MEDICINE

## 2021-05-03 PROCEDURE — 85652 RBC SED RATE AUTOMATED: CPT | Performed by: INTERNAL MEDICINE

## 2021-05-03 PROCEDURE — 83540 ASSAY OF IRON: CPT | Performed by: INTERNAL MEDICINE

## 2021-05-03 PROCEDURE — 83615 LACTATE (LD) (LDH) ENZYME: CPT | Performed by: INTERNAL MEDICINE

## 2021-05-03 PROCEDURE — 82525 ASSAY OF COPPER: CPT | Performed by: INTERNAL MEDICINE

## 2021-05-03 PROCEDURE — 84165 PROTEIN E-PHORESIS SERUM: CPT | Performed by: INTERNAL MEDICINE

## 2021-05-03 PROCEDURE — 85055 RETICULATED PLATELET ASSAY: CPT | Performed by: INTERNAL MEDICINE

## 2021-05-03 PROCEDURE — 99255 IP/OBS CONSLTJ NEW/EST HI 80: CPT | Performed by: INTERNAL MEDICINE

## 2021-05-03 PROCEDURE — 83883 ASSAY NEPHELOMETRY NOT SPEC: CPT | Performed by: INTERNAL MEDICINE

## 2021-05-03 PROCEDURE — 80053 COMPREHEN METABOLIC PANEL: CPT | Performed by: HOSPITALIST

## 2021-05-03 PROCEDURE — 86200 CCP ANTIBODY: CPT | Performed by: INTERNAL MEDICINE

## 2021-05-03 PROCEDURE — 63710000001 INSULIN LISPRO (HUMAN) PER 5 UNITS: Performed by: HOSPITALIST

## 2021-05-03 PROCEDURE — 82390 ASSAY OF CERULOPLASMIN: CPT | Performed by: INTERNAL MEDICINE

## 2021-05-03 PROCEDURE — 99214 OFFICE O/P EST MOD 30 MIN: CPT | Performed by: INTERNAL MEDICINE

## 2021-05-03 PROCEDURE — 85730 THROMBOPLASTIN TIME PARTIAL: CPT | Performed by: INTERNAL MEDICINE

## 2021-05-03 PROCEDURE — 63710000001 INSULIN LISPRO (HUMAN) PER 5 UNITS: Performed by: NURSE PRACTITIONER

## 2021-05-03 PROCEDURE — 82962 GLUCOSE BLOOD TEST: CPT

## 2021-05-03 PROCEDURE — 85027 COMPLETE CBC AUTOMATED: CPT | Performed by: HOSPITALIST

## 2021-05-03 PROCEDURE — 63710000001 INSULIN GLARGINE PER 5 UNITS: Performed by: HOSPITALIST

## 2021-05-03 PROCEDURE — 86334 IMMUNOFIX E-PHORESIS SERUM: CPT | Performed by: INTERNAL MEDICINE

## 2021-05-03 PROCEDURE — 82607 VITAMIN B-12: CPT | Performed by: INTERNAL MEDICINE

## 2021-05-03 PROCEDURE — 85046 RETICYTE/HGB CONCENTRATE: CPT | Performed by: INTERNAL MEDICINE

## 2021-05-03 PROCEDURE — 63710000001 INSULIN GLARGINE PER 5 UNITS: Performed by: NURSE PRACTITIONER

## 2021-05-03 PROCEDURE — 86038 ANTINUCLEAR ANTIBODIES: CPT | Performed by: INTERNAL MEDICINE

## 2021-05-03 PROCEDURE — 82784 ASSAY IGA/IGD/IGG/IGM EACH: CPT | Performed by: INTERNAL MEDICINE

## 2021-05-03 PROCEDURE — 81332 SERPINA1 GENE: CPT | Performed by: INTERNAL MEDICINE

## 2021-05-03 PROCEDURE — 82746 ASSAY OF FOLIC ACID SERUM: CPT | Performed by: INTERNAL MEDICINE

## 2021-05-03 PROCEDURE — 85610 PROTHROMBIN TIME: CPT | Performed by: INTERNAL MEDICINE

## 2021-05-03 RX ORDER — INSULIN LISPRO 100 [IU]/ML
13 INJECTION, SOLUTION INTRAVENOUS; SUBCUTANEOUS ONCE
Status: COMPLETED | OUTPATIENT
Start: 2021-05-03 | End: 2021-05-03

## 2021-05-03 RX ORDER — INSULIN GLARGINE 100 [IU]/ML
15 INJECTION, SOLUTION SUBCUTANEOUS ONCE
Status: COMPLETED | OUTPATIENT
Start: 2021-05-03 | End: 2021-05-03

## 2021-05-03 RX ADMIN — PREGABALIN 75 MG: 25 CAPSULE ORAL at 20:43

## 2021-05-03 RX ADMIN — PREGABALIN 75 MG: 25 CAPSULE ORAL at 09:27

## 2021-05-03 RX ADMIN — LUBIPROSTONE 8 MCG: 8 CAPSULE, GELATIN COATED ORAL at 09:27

## 2021-05-03 RX ADMIN — FLUOXETINE HYDROCHLORIDE 10 MG: 10 CAPSULE ORAL at 09:26

## 2021-05-03 RX ADMIN — METOCLOPRAMIDE 10 MG: 10 TABLET ORAL at 12:25

## 2021-05-03 RX ADMIN — SUCRALFATE 1 G: 1 TABLET ORAL at 09:26

## 2021-05-03 RX ADMIN — ALPRAZOLAM 1 MG: 0.25 TABLET ORAL at 21:36

## 2021-05-03 RX ADMIN — LEVETIRACETAM 500 MG: 500 TABLET, FILM COATED ORAL at 20:43

## 2021-05-03 RX ADMIN — FAMOTIDINE 20 MG: 20 TABLET, FILM COATED ORAL at 16:29

## 2021-05-03 RX ADMIN — METOCLOPRAMIDE 10 MG: 10 TABLET ORAL at 09:26

## 2021-05-03 RX ADMIN — INSULIN LISPRO 9 UNITS: 100 INJECTION, SOLUTION INTRAVENOUS; SUBCUTANEOUS at 16:29

## 2021-05-03 RX ADMIN — INSULIN GLARGINE 40 UNITS: 100 INJECTION, SOLUTION SUBCUTANEOUS at 09:22

## 2021-05-03 RX ADMIN — INSULIN LISPRO 8 UNITS: 100 INJECTION, SOLUTION INTRAVENOUS; SUBCUTANEOUS at 20:43

## 2021-05-03 RX ADMIN — LUBIPROSTONE 8 MCG: 8 CAPSULE, GELATIN COATED ORAL at 18:48

## 2021-05-03 RX ADMIN — INSULIN GLARGINE 15 UNITS: 100 INJECTION, SOLUTION SUBCUTANEOUS at 17:53

## 2021-05-03 RX ADMIN — INSULIN LISPRO 7 UNITS: 100 INJECTION, SOLUTION INTRAVENOUS; SUBCUTANEOUS at 12:25

## 2021-05-03 RX ADMIN — SUCRALFATE 1 G: 1 TABLET ORAL at 16:29

## 2021-05-03 RX ADMIN — OXYCODONE 5 MG: 5 TABLET ORAL at 20:43

## 2021-05-03 RX ADMIN — METOCLOPRAMIDE 10 MG: 10 TABLET ORAL at 20:43

## 2021-05-03 RX ADMIN — LEVETIRACETAM 500 MG: 500 TABLET, FILM COATED ORAL at 09:27

## 2021-05-03 RX ADMIN — INSULIN LISPRO 13 UNITS: 100 INJECTION, SOLUTION INTRAVENOUS; SUBCUTANEOUS at 17:52

## 2021-05-03 RX ADMIN — FAMOTIDINE 20 MG: 20 TABLET, FILM COATED ORAL at 09:26

## 2021-05-03 RX ADMIN — MIRTAZAPINE 15 MG: 15 TABLET, FILM COATED ORAL at 20:43

## 2021-05-03 RX ADMIN — OXYCODONE 5 MG: 5 TABLET ORAL at 06:40

## 2021-05-03 RX ADMIN — HYDROXYZINE HYDROCHLORIDE 50 MG: 50 TABLET ORAL at 20:43

## 2021-05-03 RX ADMIN — METOCLOPRAMIDE 10 MG: 10 TABLET ORAL at 16:29

## 2021-05-03 RX ADMIN — INSULIN LISPRO 4 UNITS: 100 INJECTION, SOLUTION INTRAVENOUS; SUBCUTANEOUS at 09:21

## 2021-05-03 RX ADMIN — SUCRALFATE 1 G: 1 TABLET ORAL at 12:25

## 2021-05-03 RX ADMIN — Medication 1000 MCG: at 09:27

## 2021-05-04 ENCOUNTER — READMISSION MANAGEMENT (OUTPATIENT)
Dept: CALL CENTER | Facility: HOSPITAL | Age: 61
End: 2021-05-04

## 2021-05-04 VITALS
SYSTOLIC BLOOD PRESSURE: 142 MMHG | HEIGHT: 67 IN | TEMPERATURE: 98.5 F | HEART RATE: 93 BPM | DIASTOLIC BLOOD PRESSURE: 65 MMHG | OXYGEN SATURATION: 97 % | BODY MASS INDEX: 28.49 KG/M2 | WEIGHT: 181.5 LBS | RESPIRATION RATE: 18 BRPM

## 2021-05-04 DIAGNOSIS — K74.69 OTHER CIRRHOSIS OF LIVER (HCC): ICD-10-CM

## 2021-05-04 DIAGNOSIS — D69.3 ACUTE ITP (HCC): Primary | ICD-10-CM

## 2021-05-04 LAB
ALBUMIN SERPL ELPH-MCNC: 3 G/DL (ref 2.9–4.4)
ALBUMIN/GLOB SERPL: 1.2 {RATIO} (ref 0.7–1.7)
ALPHA1 GLOB SERPL ELPH-MCNC: 0.3 G/DL (ref 0–0.4)
ALPHA2 GLOB SERPL ELPH-MCNC: 0.5 G/DL (ref 0.4–1)
ANA SER QL: NEGATIVE
B-GLOBULIN SERPL ELPH-MCNC: 0.8 G/DL (ref 0.7–1.3)
BASOPHILS # BLD AUTO: 0.01 10*3/MM3 (ref 0–0.2)
BASOPHILS NFR BLD AUTO: 0.4 % (ref 0–1.5)
DEPRECATED RDW RBC AUTO: 50.4 FL (ref 37–54)
EOSINOPHIL # BLD AUTO: 0.03 10*3/MM3 (ref 0–0.4)
EOSINOPHIL NFR BLD AUTO: 1.3 % (ref 0.3–6.2)
ERYTHROCYTE [DISTWIDTH] IN BLOOD BY AUTOMATED COUNT: 16 % (ref 12.3–15.4)
GAMMA GLOB SERPL ELPH-MCNC: 1 G/DL (ref 0.4–1.8)
GLOBULIN SER-MCNC: 2.7 G/DL (ref 2.2–3.9)
GLUCOSE BLDC GLUCOMTR-MCNC: 188 MG/DL (ref 70–130)
GLUCOSE BLDC GLUCOMTR-MCNC: 395 MG/DL (ref 70–130)
HCT VFR BLD AUTO: 31.2 % (ref 34–46.6)
HGB BLD-MCNC: 10.9 G/DL (ref 12–15.9)
IGA SERPL-MCNC: 251 MG/DL (ref 87–352)
IGG SERPL-MCNC: 1015 MG/DL (ref 586–1602)
IGM SERPL-MCNC: 190 MG/DL (ref 26–217)
IMM GRANULOCYTES # BLD AUTO: 0.01 10*3/MM3 (ref 0–0.05)
IMM GRANULOCYTES NFR BLD AUTO: 0.4 % (ref 0–0.5)
INTERPRETATION SERPL IEP-IMP: ABNORMAL
KAPPA LC FREE SER-MCNC: 47.9 MG/L (ref 3.3–19.4)
KAPPA LC FREE/LAMBDA FREE SER: 1.21 {RATIO} (ref 0.26–1.65)
LABORATORY COMMENT REPORT: ABNORMAL
LAMBDA LC FREE SERPL-MCNC: 39.6 MG/L (ref 5.7–26.3)
LYMPHOCYTES # BLD AUTO: 0.41 10*3/MM3 (ref 0.7–3.1)
LYMPHOCYTES NFR BLD AUTO: 17.2 % (ref 19.6–45.3)
M PROTEIN SERPL ELPH-MCNC: ABNORMAL G/DL
MCH RBC QN AUTO: 30.5 PG (ref 26.6–33)
MCHC RBC AUTO-ENTMCNC: 34.9 G/DL (ref 31.5–35.7)
MCV RBC AUTO: 87.4 FL (ref 79–97)
MONOCYTES # BLD AUTO: 0.19 10*3/MM3 (ref 0.1–0.9)
MONOCYTES NFR BLD AUTO: 7.9 % (ref 5–12)
NEUTROPHILS NFR BLD AUTO: 1.74 10*3/MM3 (ref 1.7–7)
NEUTROPHILS NFR BLD AUTO: 72.8 % (ref 42.7–76)
NRBC BLD AUTO-RTO: 0 /100 WBC (ref 0–0.2)
PLATELET # BLD AUTO: 52 10*3/MM3 (ref 140–450)
PMV BLD AUTO: 11 FL (ref 6–12)
PROT SERPL-MCNC: 5.7 G/DL (ref 6–8.5)
RBC # BLD AUTO: 3.57 10*6/MM3 (ref 3.77–5.28)
WBC # BLD AUTO: 2.39 10*3/MM3 (ref 3.4–10.8)

## 2021-05-04 PROCEDURE — 82962 GLUCOSE BLOOD TEST: CPT

## 2021-05-04 PROCEDURE — 51798 US URINE CAPACITY MEASURE: CPT

## 2021-05-04 PROCEDURE — 99213 OFFICE O/P EST LOW 20 MIN: CPT | Performed by: INTERNAL MEDICINE

## 2021-05-04 PROCEDURE — 85025 COMPLETE CBC W/AUTO DIFF WBC: CPT | Performed by: HOSPITALIST

## 2021-05-04 PROCEDURE — 63710000001 INSULIN GLARGINE PER 5 UNITS: Performed by: NURSE PRACTITIONER

## 2021-05-04 PROCEDURE — G0378 HOSPITAL OBSERVATION PER HR: HCPCS

## 2021-05-04 PROCEDURE — 63710000001 INSULIN LISPRO (HUMAN) PER 5 UNITS: Performed by: NURSE PRACTITIONER

## 2021-05-04 RX ADMIN — SUCRALFATE 1 G: 1 TABLET ORAL at 06:20

## 2021-05-04 RX ADMIN — SUCRALFATE 1 G: 1 TABLET ORAL at 11:50

## 2021-05-04 RX ADMIN — FLUOXETINE HYDROCHLORIDE 10 MG: 10 CAPSULE ORAL at 09:04

## 2021-05-04 RX ADMIN — FAMOTIDINE 20 MG: 20 TABLET, FILM COATED ORAL at 06:20

## 2021-05-04 RX ADMIN — LUBIPROSTONE 8 MCG: 8 CAPSULE, GELATIN COATED ORAL at 09:04

## 2021-05-04 RX ADMIN — OXYCODONE 5 MG: 5 TABLET ORAL at 06:25

## 2021-05-04 RX ADMIN — METOCLOPRAMIDE 10 MG: 10 TABLET ORAL at 06:20

## 2021-05-04 RX ADMIN — LEVETIRACETAM 500 MG: 500 TABLET, FILM COATED ORAL at 09:04

## 2021-05-04 RX ADMIN — INSULIN LISPRO 8 UNITS: 100 INJECTION, SOLUTION INTRAVENOUS; SUBCUTANEOUS at 11:50

## 2021-05-04 RX ADMIN — INSULIN LISPRO 2 UNITS: 100 INJECTION, SOLUTION INTRAVENOUS; SUBCUTANEOUS at 09:04

## 2021-05-04 RX ADMIN — METOCLOPRAMIDE 10 MG: 10 TABLET ORAL at 11:50

## 2021-05-04 RX ADMIN — INSULIN GLARGINE 40 UNITS: 100 INJECTION, SOLUTION SUBCUTANEOUS at 09:04

## 2021-05-04 RX ADMIN — Medication 1000 MCG: at 10:37

## 2021-05-04 RX ADMIN — PREGABALIN 75 MG: 25 CAPSULE ORAL at 11:50

## 2021-05-04 NOTE — OUTREACH NOTE
Prep Survey      Responses   [facility] patient discharged from?  [place]   Is LACE score < 7 ?  No   Emergency Room discharge w/ pulse ox?  No   [facility]   Date of Admission  04/29/21   Date of Discharge  05/04/21   Discharge Disposition  Home or Self Care   Discharge diagnosis  Gastroparesis   Does the patient have one of the following disease processes/diagnoses(primary or secondary)?  Other   Does the patient have Home health ordered?  No   Is there a DME ordered?  No   Prep survey completed?  Yes          Katheryn Israel RN

## 2021-05-05 ENCOUNTER — TRANSITIONAL CARE MANAGEMENT TELEPHONE ENCOUNTER (OUTPATIENT)
Dept: CALL CENTER | Facility: HOSPITAL | Age: 61
End: 2021-05-05

## 2021-05-05 LAB
CCP IGA+IGG SERPL IA-ACNC: 16 UNITS (ref 0–19)
ZINC RBC-MCNC: 1370 UG/DL (ref 878–1660)

## 2021-05-05 NOTE — OUTREACH NOTE
"Call Center TCM Note      Responses   [facility] patient discharged from?  [place]   Does the patient have one of the following disease processes/diagnoses(primary or secondary)?  Other   TCM attempt successful?  Yes [Beka Jason, and Edwige on verbal release ]   Call start time  1032   Call end time  1036   Discharge diagnosis  Gastroparesis   Meds reviewed with patient/caregiver?  Yes   Is the patient having any side effects they believe may be caused by any medication additions or changes?  No   Does the patient have all medications ordered at discharge?  N/A   Is the patient taking all medications as directed (includes completed medication regime)?  Yes   Does the patient have a primary care provider?   Yes   Does the patient have an appointment with their PCP within 7 days of discharge?  Yes   Comments regarding PCP  Apt on 5-10-21 at 2:15 with PCP    Has home health visited the patient within 72 hours of discharge?  N/A   Psychosocial issues?  No   Did the patient receive a copy of their discharge instructions?  Yes   Nursing interventions  Reviewed instructions with patient   What is the patient's perception of their health status since discharge?  Improving [\"Ate scrambled eggs this morning-still weak, but, not throwing up and I had a BM\" ]   Is the patient/caregiver able to teach back signs and symptoms related to disease process for when to call PCP?  Yes   Is the patient/caregiver able to teach back signs and symptoms related to disease process for when to call 911?  Yes   Is the patient/caregiver able to teach back the hierarchy of who to call/visit for symptoms/problems? PCP, Specialist, Home health nurse, Urgent Care, ED, 911  Yes   If the patient is a current smoker, are they able to teach back resources for cessation?  Not a smoker   TCM call completed?  Yes          Sherie Flower RN    5/5/2021, 10:36 EDT      "

## 2021-05-06 ENCOUNTER — IMMUNIZATION (OUTPATIENT)
Dept: VACCINE CLINIC | Facility: HOSPITAL | Age: 61
End: 2021-05-06

## 2021-05-06 LAB — COPPER SERPL-MCNC: 149 UG/DL (ref 80–158)

## 2021-05-06 PROCEDURE — 91300 HC SARSCOV02 VAC 30MCG/0.3ML IM: CPT | Performed by: INTERNAL MEDICINE

## 2021-05-06 PROCEDURE — 0002A: CPT | Performed by: INTERNAL MEDICINE

## 2021-05-10 ENCOUNTER — OFFICE VISIT (OUTPATIENT)
Dept: INTERNAL MEDICINE | Age: 61
End: 2021-05-10

## 2021-05-10 VITALS
SYSTOLIC BLOOD PRESSURE: 110 MMHG | WEIGHT: 175.6 LBS | DIASTOLIC BLOOD PRESSURE: 62 MMHG | BODY MASS INDEX: 27.56 KG/M2 | HEIGHT: 67 IN | OXYGEN SATURATION: 99 % | HEART RATE: 104 BPM | TEMPERATURE: 96.8 F

## 2021-05-10 DIAGNOSIS — Z09 HOSPITAL DISCHARGE FOLLOW-UP: Primary | ICD-10-CM

## 2021-05-10 DIAGNOSIS — K31.84 GASTROPARESIS: ICD-10-CM

## 2021-05-10 PROCEDURE — 1111F DSCHRG MED/CURRENT MED MERGE: CPT | Performed by: NURSE PRACTITIONER

## 2021-05-10 PROCEDURE — 99212 OFFICE O/P EST SF 10 MIN: CPT | Performed by: NURSE PRACTITIONER

## 2021-05-10 NOTE — PROGRESS NOTES
Transitional Care Follow Up Visit  Subjective     Silvia Zabala is a 60 y.o. female who presents for a transitional care management visit.    Within 48 business hours after discharge our office contacted her via telephone to coordinate her care and needs.      I reviewed and discussed the details of that call along with the discharge summary, hospital problems, inpatient lab results, inpatient diagnostic studies, and consultation reports with Silvia.     Current outpatient and discharge medications have been reconciled for the patient.  Reviewed by: JOEL Sher      Date of TCM Phone Call 5/4/2021   [facility]   Date of Admission 4/29/2021   Date of Discharge 5/4/2021   Discharge Disposition Home or Self Care     Risk for Readmission (LACE) Score: 12 (5/4/2021  6:01 AM)      History of Present Illness   Course During Hospital Stay: Patient presented to the emergency room on April 29.  She has a standing history of Wong, IDDM, chronic abdominal pain and gastroparesis.  She presented to the emergency room with nausea and vomiting that started 1 week prior.  The symptoms became so severe that she is now unable to hold down any fluids.  Urine was dark at the time.  She tried nauseating, Phenergan tablets with no relief.  During hospitalization she was treated with IV Reglan and antiemetics along with scopolamine transdermal.  GI recommended medication treatment as does.  C. difficile was checked which was also negative.  He does have chronic diarrhea.  Dr. Flores, hematology, evaluated patient during hospitalization until she could follow-up with Dr. Mcdowell outpatient.  Suggested repeat bone marrow testing as outpatient, with decrease in platelets at discharge of 52.  They checked UTI and bladder scan which were unremarkable.  At discharge pancytopenia improved to 2.39 and hemoglobin of 10.9 which is at her baseline.     The following portions of the patient's history were reviewed and  updated as appropriate: allergies, current medications, past family history, past medical history, past social history, past surgical history and problem list.    Review of Systems   Constitutional neg except per HPI   Resp neg  CV neg  GI baseline abdominal pain     Objective   Physical Exam   Constitutional  No distress  Cardiovascular Rate  normal . Rhythm: regular . Heart sounds:  normal  Pulmonary/Chest  Effort normal. Breath sounds:  normal  Psychiatric  Alert. Judgment and thought content normal. Mood normal     Assessment/Plan   Problems Addressed this Visit        Gastrointestinal Abdominal     Gastroparesis       Health Encounters    Hospital discharge follow-up - Primary      Diagnoses       Codes Comments    Hospital discharge follow-up    -  Primary ICD-10-CM: Z09  ICD-9-CM: V67.59     Gastroparesis     ICD-10-CM: K31.84  ICD-9-CM: 536.3         · Follow-up with Dr. Mcdowell on May 26 as scheduled.  Repeat labs scheduled for May 25.  · Follow-up with gastroenterology as scheduled.  · Follow-up in 1 month at our office for Medicare annual wellness.

## 2021-05-12 ENCOUNTER — READMISSION MANAGEMENT (OUTPATIENT)
Dept: CALL CENTER | Facility: HOSPITAL | Age: 61
End: 2021-05-12

## 2021-05-12 LAB
LAB DIRECTOR NAME PROVIDER: NORMAL
SERPINA1 GENE MUT ANL BLD/T: NORMAL
SERPINA1 GENE MUT TESTED BLD/T: NORMAL

## 2021-05-12 NOTE — OUTREACH NOTE
Medical Week 2 Survey      Responses   [facility] patient discharged from?  [place]   Does the patient have one of the following disease processes/diagnoses(primary or secondary)?  Other   Week 2 attempt successful?  Yes   Call start time  1102   Discharge diagnosis  Gastroparesis   Rescheduled  Rescheduled-pt requested [Resting at time of call, has a headache.]          Charisma Cardoza RN

## 2021-05-14 ENCOUNTER — APPOINTMENT (OUTPATIENT)
Dept: WOMENS IMAGING | Facility: HOSPITAL | Age: 61
End: 2021-05-14

## 2021-05-14 PROCEDURE — 76641 ULTRASOUND BREAST COMPLETE: CPT | Performed by: RADIOLOGY

## 2021-05-14 PROCEDURE — 77065 DX MAMMO INCL CAD UNI: CPT | Performed by: RADIOLOGY

## 2021-05-14 PROCEDURE — G0279 TOMOSYNTHESIS, MAMMO: HCPCS | Performed by: RADIOLOGY

## 2021-05-14 PROCEDURE — 77061 BREAST TOMOSYNTHESIS UNI: CPT | Performed by: RADIOLOGY

## 2021-05-16 ENCOUNTER — READMISSION MANAGEMENT (OUTPATIENT)
Dept: CALL CENTER | Facility: HOSPITAL | Age: 61
End: 2021-05-16

## 2021-05-16 NOTE — OUTREACH NOTE
Medical Week 2 Survey      Responses   [facility] patient discharged from?  [place]   Does the patient have one of the following disease processes/diagnoses(primary or secondary)?  Other   Week 2 attempt successful?  Yes   Call start time  1100   Call end time  1104   Meds reviewed with patient/caregiver?  Yes   Is the patient taking all medications as directed (includes completed medication regime)?  Yes   Medication comments  Having trouble with Ins. paying for Insulin but have meds, PA by . being done again   Has the patient kept scheduled appointments due by today?  Yes   Comments  Blood sugars 138, always has nausea and some vomiting due to gastroparesis, but doing better, able to keep some food down, Blood sugars doing better to, she states   What is the patient's perception of their health status since discharge?  Improving   Week 2 Call Completed?  Yes   Graduated  Yes   Did the patient feel the follow up calls were helpful during their recovery period?  Yes   Was the number of calls appropriate?  Yes   Graduated/Revoked comments  She si seeing her NP frequently, can get into her easily and a hold of her easily, no need for calls, she states, no new issues or questions.           Eugenia Weston RN

## 2021-05-24 ENCOUNTER — LAB (OUTPATIENT)
Dept: LAB | Facility: HOSPITAL | Age: 61
End: 2021-05-24

## 2021-05-24 ENCOUNTER — OFFICE VISIT (OUTPATIENT)
Dept: ONCOLOGY | Facility: CLINIC | Age: 61
End: 2021-05-24

## 2021-05-24 VITALS
TEMPERATURE: 97.8 F | BODY MASS INDEX: 27.78 KG/M2 | RESPIRATION RATE: 18 BRPM | SYSTOLIC BLOOD PRESSURE: 154 MMHG | HEIGHT: 67 IN | WEIGHT: 177 LBS | HEART RATE: 92 BPM | DIASTOLIC BLOOD PRESSURE: 80 MMHG

## 2021-05-24 DIAGNOSIS — D69.3 ACUTE ITP (HCC): ICD-10-CM

## 2021-05-24 DIAGNOSIS — K74.69 OTHER CIRRHOSIS OF LIVER (HCC): ICD-10-CM

## 2021-05-24 DIAGNOSIS — K75.81 LIVER CIRRHOSIS SECONDARY TO NASH (HCC): ICD-10-CM

## 2021-05-24 DIAGNOSIS — K74.60 LIVER CIRRHOSIS SECONDARY TO NASH (HCC): ICD-10-CM

## 2021-05-24 DIAGNOSIS — I85.11 SECONDARY ESOPHAGEAL VARICES WITH BLEEDING (HCC): ICD-10-CM

## 2021-05-24 DIAGNOSIS — D61.818 PANCYTOPENIA (HCC): ICD-10-CM

## 2021-05-24 DIAGNOSIS — K75.81 NASH (NONALCOHOLIC STEATOHEPATITIS): ICD-10-CM

## 2021-05-24 DIAGNOSIS — K31.84 GASTROPARESIS: ICD-10-CM

## 2021-05-24 DIAGNOSIS — D69.6 THROMBOCYTOPENIA (HCC): Primary | ICD-10-CM

## 2021-05-24 DIAGNOSIS — D50.9 IRON DEFICIENCY ANEMIA, UNSPECIFIED IRON DEFICIENCY ANEMIA TYPE: ICD-10-CM

## 2021-05-24 LAB
ALBUMIN SERPL-MCNC: 3.5 G/DL (ref 3.5–5.2)
ALBUMIN/GLOB SERPL: 1.1 G/DL (ref 1.1–2.4)
ALP SERPL-CCNC: 155 U/L (ref 38–116)
ALT SERPL W P-5'-P-CCNC: 27 U/L (ref 0–33)
ANION GAP SERPL CALCULATED.3IONS-SCNC: 9.4 MMOL/L (ref 5–15)
AST SERPL-CCNC: 27 U/L (ref 0–32)
BASOPHILS # BLD AUTO: 0.02 10*3/MM3 (ref 0–0.2)
BASOPHILS NFR BLD AUTO: 0.6 % (ref 0–1.5)
BILIRUB SERPL-MCNC: 1.3 MG/DL (ref 0.2–1.2)
BUN SERPL-MCNC: 18 MG/DL (ref 6–20)
BUN/CREAT SERPL: 22.2 (ref 7.3–30)
CALCIUM SPEC-SCNC: 9.2 MG/DL (ref 8.5–10.2)
CHLORIDE SERPL-SCNC: 95 MMOL/L (ref 98–107)
CO2 SERPL-SCNC: 25.6 MMOL/L (ref 22–29)
CREAT SERPL-MCNC: 0.81 MG/DL (ref 0.6–1.1)
DEPRECATED RDW RBC AUTO: 47.8 FL (ref 37–54)
EOSINOPHIL # BLD AUTO: 0.05 10*3/MM3 (ref 0–0.4)
EOSINOPHIL NFR BLD AUTO: 1.6 % (ref 0.3–6.2)
ERYTHROCYTE [DISTWIDTH] IN BLOOD BY AUTOMATED COUNT: 15.6 % (ref 12.3–15.4)
FERRITIN SERPL-MCNC: 77.1 NG/ML (ref 11–207)
GFR SERPL CREATININE-BSD FRML MDRD: 73 ML/MIN/1.73
GLOBULIN UR ELPH-MCNC: 3.1 GM/DL (ref 1.8–3.5)
GLUCOSE SERPL-MCNC: 679 MG/DL (ref 74–124)
HCT VFR BLD AUTO: 32.6 % (ref 34–46.6)
HGB BLD-MCNC: 11.3 G/DL (ref 12–15.9)
IMM GRANULOCYTES # BLD AUTO: 0.03 10*3/MM3 (ref 0–0.05)
IMM GRANULOCYTES NFR BLD AUTO: 1 % (ref 0–0.5)
IRON 24H UR-MRATE: 70 MCG/DL (ref 37–145)
IRON SATN MFR SERPL: 18 % (ref 14–48)
LYMPHOCYTES # BLD AUTO: 0.59 10*3/MM3 (ref 0.7–3.1)
LYMPHOCYTES NFR BLD AUTO: 19.2 % (ref 19.6–45.3)
MCH RBC QN AUTO: 29.5 PG (ref 26.6–33)
MCHC RBC AUTO-ENTMCNC: 34.7 G/DL (ref 31.5–35.7)
MCV RBC AUTO: 85.1 FL (ref 79–97)
MONOCYTES # BLD AUTO: 0.25 10*3/MM3 (ref 0.1–0.9)
MONOCYTES NFR BLD AUTO: 8.1 % (ref 5–12)
NEUTROPHILS NFR BLD AUTO: 2.14 10*3/MM3 (ref 1.7–7)
NEUTROPHILS NFR BLD AUTO: 69.5 % (ref 42.7–76)
NRBC BLD AUTO-RTO: 0 /100 WBC (ref 0–0.2)
PLATELET # BLD AUTO: 83 10*3/MM3 (ref 140–450)
PMV BLD AUTO: 10.2 FL (ref 6–12)
POTASSIUM SERPL-SCNC: 4.1 MMOL/L (ref 3.5–4.7)
PROT SERPL-MCNC: 6.6 G/DL (ref 6.3–8)
RBC # BLD AUTO: 3.83 10*6/MM3 (ref 3.77–5.28)
SODIUM SERPL-SCNC: 130 MMOL/L (ref 134–145)
TIBC SERPL-MCNC: 398 MCG/DL (ref 249–505)
TRANSFERRIN SERPL-MCNC: 284 MG/DL (ref 200–360)
WBC # BLD AUTO: 3.08 10*3/MM3 (ref 3.4–10.8)

## 2021-05-24 PROCEDURE — 84466 ASSAY OF TRANSFERRIN: CPT | Performed by: INTERNAL MEDICINE

## 2021-05-24 PROCEDURE — 85025 COMPLETE CBC W/AUTO DIFF WBC: CPT

## 2021-05-24 PROCEDURE — 83540 ASSAY OF IRON: CPT | Performed by: INTERNAL MEDICINE

## 2021-05-24 PROCEDURE — 80053 COMPREHEN METABOLIC PANEL: CPT

## 2021-05-24 PROCEDURE — 82728 ASSAY OF FERRITIN: CPT | Performed by: INTERNAL MEDICINE

## 2021-05-24 PROCEDURE — 36415 COLL VENOUS BLD VENIPUNCTURE: CPT

## 2021-05-24 PROCEDURE — 99214 OFFICE O/P EST MOD 30 MIN: CPT | Performed by: INTERNAL MEDICINE

## 2021-05-24 NOTE — PROGRESS NOTES
REASON FOR FOLLOWUP:    1. 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   2. Evidence of B12 deficiency on her initial lab evaluation in April 2013.  Patient was started on parenteral B12 replacement.  The patient was switched from shots to daily oral B12 replacement after the visit of 12/02/2013.    3. Hemoglobin has significantly improved since banding of her esophageal varices and increase in her oral iron supplement to twice daily dosing.  4. Morphologically normal bone marrow from 08/20/2013 with normal flow cytometry.  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  Significance of this is uncertain.    5. Mixed connective tissue disorder, currently on Enbrel therapy.  6. Insulin-requiring diabetes mellitus.  7. Patient continues to follow-up periodically with the transplant team at [facility] but currently she is not on the transplant list.  8. 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 Rituxan therapy weekly ×4 completed 7/16/2018     HISTORY OF PRESENT ILLNESS:  Silvia is a 60 y.o. 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  She has had GI bleeding in the past due to portal hypertension.      She also developed acute ITP and received Rituxan weekly ×4.  She completed her Rituxan treatment on 7/16/2018.  She had a good response.  Her platelet count did not correct to normal but did return to her baseline platelet count of around 60,000-90,000.    She also had received 2 doses of IV iron therapy in the form of Injectafer 750 mg each dose delivered on 1/29/2019 and 2/13/2019.      Silvia again required hospitalization at [facility] from 5/28/2019 to 5/30/2019 due to symptomatic edema requiring diuretic therapy.  She also had been hospitalized at [facility] [place] from 5/9/2019 to 5/14/2019 and on that admission she  underwent an esophageal varices banding procedure on 5/10/2019.    She received additional IV Injectafer on 6/26/2019 and 7/3/2019.    She has been in [place] taking care of her dying father for several months and has not been seen in our office since July 2019.  Her father has since passed away and she returned to [place] In December 2020.    While in [place] she also was very ill due to her cirrhosis and portal hypertension.  She had bleeding esophageal varices on several occasions requiring admission and transfusions.  She ended up undergoing a TI PS procedure.    Since her last visit to the office in January 2021 she has had several hospital admissions for complications related to her cirrhosis and diabetes.  Her latest admission was from 4/29/2021 to 5/4/2021 with protracted nausea and vomiting from gastroparesis.  She remains on Reglan.  Her last EGD was performed by Dr. Endy Vasquez.    Her blood count in the office today looks slightly improved with a platelet count of 83,000 and a hemoglobin of 11.3 g/dL.  White count is 3000.     She is following up with Dr. Montes at [facility] regarding potential liver transplant.    Past Medical History, Past Surgical History, Social History, Family History have been reviewed and are without significant changes except as mentioned.      Review of Systems   Constitutional: Positive for fatigue and fever. Negative for activity change.   HENT: Negative for mouth sores, nosebleeds and trouble swallowing.    Respiratory: Negative for cough, shortness of breath and wheezing.    Cardiovascular: Negative for chest pain and palpitations.   Gastrointestinal: Positive for constipation, nausea and vomiting. Negative for diarrhea.   Genitourinary: Negative for dysuria and hematuria.   Musculoskeletal: Negative for arthralgias and myalgias.        Muscle cramps   Skin: Negative for rash and wound.   Neurological: Negative for seizures and syncope.   Hematological: Negative for adenopathy.  "Bruises/bleeds easily.   Psychiatric/Behavioral: Positive for dysphoric mood. Negative for confusion.       Medications:  The current medication list was reviewed in the EMR    ALLERGIES:    Allergies   Allergen Reactions   • Albuterol Anaphylaxis   • Imitrex [Sumatriptan] Anaphylaxis   • Tramadol Nausea Only, Other (See Comments) and GI Intolerance     Per pt causes her \"palsy, meaning shaking and tremors\"    • Nsaids Other (See Comments)     Told by MD not to take due to liver problems   • Tylenol [Acetaminophen] Other (See Comments)     Has liver problems and told by MD to not take   • Zofran [Ondansetron Hcl] Other (See Comments)     Pt states does not work to correct nausea and vomiting.    • Codeine Nausea Only, Rash and GI Intolerance   • Lactulose Nausea And Vomiting and Other (See Comments)     Severe abdominal pain   • Quinine Derivatives Nausea And Vomiting              Vitals:    05/24/21 0800   BP: 154/80   Pulse: 92   Resp: 18   Temp: 97.8 °F (36.6 °C)   TempSrc: Skin   Weight: 80.3 kg (177 lb)  Comment: new weight   Height: 170.2 cm (67.01\")  Comment: new height   PainSc:   7   PainLoc: Generalized     Physical Exam    CONSTITUTIONAL:  Vital signs reviewed.  No distress, looks comfortable.  EYES:  Conjunctiva and lids unremarkable.  PERRLA  EARS,NOSE,MOUTH,THROAT:  Ears and nose appear unremarkable.  Lips, teeth, gums appear unremarkable.  RESPIRATORY:  Normal respiratory effort.  Lungs clear to auscultation bilaterally.  CARDIOVASCULAR:  Normal S1, S2.  No murmurs rubs or gallops.  No significant lower extremity edema.  GASTROINTESTINAL: Abdomen appears distended with positive fluid wave   MUSCULOSKELETAL:  Unremarkable digits/nails.  No cyanosis or clubbing.  SKIN:  Warm.  No rashes.  PSYCHIATRIC: She seems depressed.  She is on Xifaxan for hepatic encephalopathy.  I have reexamined the patient and the results are consistent with the previously documented exam. Sukhdeep Mcdowell MD      RECENT " LABS:  WBC   Date Value Ref Range Status   05/24/2021 3.08 (L) 3.40 - 10.80 10*3/mm3 Final     Hemoglobin   Date Value Ref Range Status   05/24/2021 11.3 (L) 12.0 - 15.9 g/dL Final     Platelets   Date Value Ref Range Status   05/24/2021 83 (L) 140 - 450 10*3/mm3 Final     Lab Results   Component Value Date    NEUTROABS 2.14 05/24/2021     Lab Results   Component Value Date    IRON 52 05/03/2021    TIBC 407 05/03/2021    FERRITIN 155.00 (H) 05/03/2021   SAT 13%    Lab Results   Component Value Date    GLUCOSE 210 (H) 05/03/2021    BUN 10 05/03/2021    CREATININE 0.80 05/03/2021    EGFRIFNONA 74 05/03/2021    EGFRIFAFRI 74 04/22/2021    BCR 12.5 05/03/2021    K 4.3 05/03/2021    CO2 20.7 (L) 05/03/2021    CALCIUM 8.0 (L) 05/03/2021    PROTENTOTREF 5.7 (L) 05/03/2021    ALBUMIN 3.0 05/03/2021    LABIL2 1.2 05/03/2021    AST 41 (H) 05/03/2021    ALT 24 05/03/2021             CT ABDOMEN PELVIS W CONTRAST- 4/29/2021  IMPRESSION:   1.  Cirrhotic liver morphology with sequela of portal hypertension  including portal venous dilatation and splenomegaly. Mild eccentric  hypoattenuation within the superior aspect of a TIPS is unchanged and  may represent nonocclusive thrombus.  2.  Distal esophageal and gastric wall thickening have slightly  improved, although, the stomach is poorly distended and not well  evaluated. Distal esophageal wall thickening can be seen with reflux  esophagitis. Consider follow-up upper endoscopy to exclude an underlying  lesion.  3.  No bowel obstruction or CT evidence of acute appendicitis.      ASSESSMENT/PLAN:     *ITP.   Her platelets now seem to be back at her baseline in the 50,000 - 100,000 range from hypersplenism.    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     *B12 deficiency.  On oral B12 since December 2013.     *History of esophageal varices banding.  She since has undergone a TI PS procedure     *Iron deficiency anemia due to GI bleeding.  Her hemoglobin is improved  with oral iron supplementation.  Repeat iron studies from 5/3/2021 were normal as noted above although her iron saturation was low at 13%.    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  This is of unknown significance.     *Rheumatoid arthritis.       *Lupus     *Diabetes.  Hyperglycemia today with a blood sugar of 679      *Cirrhosis reportedly due to DUKES.      *Gastroparesis.  Improved with Reglan.  She is worried about developing tar dive dyskinesia.        Plan  1.  We have asked Silvia to continue her oral iron supplementation for now.  If her iron studies today show she needs additional iron we may contact her to try to arrange IV iron therapy rather than intensifying her oral iron due to her issues with gastroparesis and constipation.  2.  MD follow-up scheduled in 3 months with repeat labs to be drawn 1 week prior to the visit including CBC serum chemistries iron panel ferritin and B12.

## 2021-05-26 ENCOUNTER — APPOINTMENT (OUTPATIENT)
Dept: LAB | Facility: HOSPITAL | Age: 61
End: 2021-05-26

## 2021-06-04 ENCOUNTER — PRIOR AUTHORIZATION (OUTPATIENT)
Dept: GASTROENTEROLOGY | Facility: CLINIC | Age: 61
End: 2021-06-04

## 2021-06-06 NOTE — ANESTHESIA PREPROCEDURE EVALUATION
Anesthesia Evaluation     Patient summary reviewed   NPO Solid Status: > 8 hours  NPO Liquid Status: > 8 hours           Airway   Mallampati: III  TM distance: >3 FB  Neck ROM: full  No difficulty expected  Dental    (+) edentulous    Pulmonary     breath sounds clear to auscultation  Cardiovascular   Exercise tolerance: poor (<4 METS)    Rhythm: regular  Rate: normal        Neuro/Psych  GI/Hepatic/Renal/Endo      Musculoskeletal     Abdominal    Substance History      OB/GYN          Other                        Anesthesia Plan    ASA 3     MAC     intravenous induction   Anesthetic plan, all risks, benefits, and alternatives have been provided, discussed and informed consent has been obtained with: patient.       -- DO NOT REPLY / DO NOT REPLY ALL --  -- Message is from the [facility]--    Provider paged via PAK Documentation - The below message was copied and pasted from a fintonic page:    Initiated Date/Time 6/5/2021 9:14 pm   Message Sent Date/Time 6/5/2021 9:16 pm   Source [facility] Contact Center   Department [facility]   Method Secure Text   Contacted Digna Whitlock MD   Details Patient   Message   [phone] ACC URGENT CALLER NAME: MALKA - DAUGHTER REQUESTED PHYSICIAN: DIGNA WHITLOCK RE: KEITH GAR PATIENT 1935 PATIENT PCP: [facility], PHARMACY #: [phone] PATIENT'S HOUSE CAUGHT FIRE TODAY. PATIENT LOST ALL ACTIVE MEDICATIONS TO THE FIRE. FAMILY IS CONCERNED. *PVIZ

## 2021-06-14 ENCOUNTER — APPOINTMENT (OUTPATIENT)
Dept: CT IMAGING | Facility: HOSPITAL | Age: 61
End: 2021-06-14

## 2021-06-14 ENCOUNTER — HOSPITAL ENCOUNTER (EMERGENCY)
Facility: HOSPITAL | Age: 61
Discharge: HOME | End: 2021-06-14
Attending: EMERGENCY MEDICINE | Admitting: EMERGENCY MEDICINE

## 2021-06-14 VITALS
RESPIRATION RATE: 16 BRPM | HEART RATE: 86 BPM | SYSTOLIC BLOOD PRESSURE: 160 MMHG | OXYGEN SATURATION: 94 % | DIASTOLIC BLOOD PRESSURE: 74 MMHG | TEMPERATURE: 98.6 F

## 2021-06-14 DIAGNOSIS — K74.69 OTHER CIRRHOSIS OF LIVER (HCC): ICD-10-CM

## 2021-06-14 DIAGNOSIS — I85.11 SECONDARY ESOPHAGEAL VARICES WITH BLEEDING (HCC): ICD-10-CM

## 2021-06-14 DIAGNOSIS — D69.3 ACUTE ITP (HCC): ICD-10-CM

## 2021-06-14 DIAGNOSIS — K75.81 NASH (NONALCOHOLIC STEATOHEPATITIS): ICD-10-CM

## 2021-06-14 DIAGNOSIS — K31.84 GASTROPARESIS: ICD-10-CM

## 2021-06-14 DIAGNOSIS — D50.9 IRON DEFICIENCY ANEMIA, UNSPECIFIED IRON DEFICIENCY ANEMIA TYPE: ICD-10-CM

## 2021-06-14 DIAGNOSIS — R53.1 GENERALIZED WEAKNESS: ICD-10-CM

## 2021-06-14 DIAGNOSIS — D69.6 THROMBOCYTOPENIA (HCC): ICD-10-CM

## 2021-06-14 DIAGNOSIS — R51.9 NONINTRACTABLE EPISODIC HEADACHE, UNSPECIFIED HEADACHE TYPE: ICD-10-CM

## 2021-06-14 DIAGNOSIS — N30.01 ACUTE CYSTITIS WITH HEMATURIA: Primary | ICD-10-CM

## 2021-06-14 DIAGNOSIS — K74.60 LIVER CIRRHOSIS SECONDARY TO NASH (HCC): ICD-10-CM

## 2021-06-14 DIAGNOSIS — K75.81 LIVER CIRRHOSIS SECONDARY TO NASH (HCC): ICD-10-CM

## 2021-06-14 DIAGNOSIS — D61.818 PANCYTOPENIA (HCC): ICD-10-CM

## 2021-06-14 LAB
ALBUMIN SERPL-MCNC: 3.7 G/DL (ref 3.5–5.2)
ALBUMIN/GLOB SERPL: 1.2 G/DL
ALP SERPL-CCNC: 118 U/L (ref 39–117)
ALT SERPL W P-5'-P-CCNC: 29 U/L (ref 1–33)
AMMONIA BLD-SCNC: 35 UMOL/L (ref 11–51)
ANION GAP SERPL CALCULATED.3IONS-SCNC: 10.4 MMOL/L (ref 5–15)
AST SERPL-CCNC: 47 U/L (ref 1–32)
BACTERIA UR QL AUTO: ABNORMAL /HPF
BILIRUB SERPL-MCNC: 1.7 MG/DL (ref 0–1.2)
BILIRUB UR QL STRIP: NEGATIVE
BUN SERPL-MCNC: 19 MG/DL (ref 6–20)
BUN/CREAT SERPL: 19.8 (ref 7–25)
CALCIUM SPEC-SCNC: 8.9 MG/DL (ref 8.6–10.5)
CHLORIDE SERPL-SCNC: 105 MMOL/L (ref 98–107)
CLARITY UR: ABNORMAL
CO2 SERPL-SCNC: 24.6 MMOL/L (ref 22–29)
COLOR UR: ABNORMAL
CREAT SERPL-MCNC: 0.96 MG/DL (ref 0.57–1)
D-LACTATE SERPL-SCNC: 1.6 MMOL/L (ref 0.5–2)
DEPRECATED RDW RBC AUTO: 49.6 FL (ref 37–54)
EOSINOPHIL # BLD MANUAL: 0.02 10*3/MM3 (ref 0–0.4)
EOSINOPHIL NFR BLD MANUAL: 2 % (ref 0.3–6.2)
ERYTHROCYTE [DISTWIDTH] IN BLOOD BY AUTOMATED COUNT: 16 % (ref 12.3–15.4)
GFR SERPL CREATININE-BSD FRML MDRD: 60 ML/MIN/1.73
GLOBULIN UR ELPH-MCNC: 3 GM/DL
GLUCOSE SERPL-MCNC: 93 MG/DL (ref 65–99)
GLUCOSE UR STRIP-MCNC: NEGATIVE MG/DL
HCT VFR BLD AUTO: 34.2 % (ref 34–46.6)
HGB BLD-MCNC: 11.9 G/DL (ref 12–15.9)
HGB RETIC QN AUTO: 31.1 PG (ref 29.8–36.1)
HGB UR QL STRIP.AUTO: ABNORMAL
HYALINE CASTS UR QL AUTO: ABNORMAL /LPF
IMM RETICS NFR: 11.1 % (ref 3–15.8)
KETONES UR QL STRIP: ABNORMAL
LEUKOCYTE ESTERASE UR QL STRIP.AUTO: ABNORMAL
LYMPHOCYTES # BLD MANUAL: 0.49 10*3/MM3 (ref 0.7–3.1)
LYMPHOCYTES NFR BLD MANUAL: 26 % (ref 19.6–45.3)
LYMPHOCYTES NFR BLD MANUAL: 45 % (ref 5–12)
MAGNESIUM SERPL-MCNC: 1.5 MG/DL (ref 1.6–2.6)
MCH RBC QN AUTO: 29.5 PG (ref 26.6–33)
MCHC RBC AUTO-ENTMCNC: 34.8 G/DL (ref 31.5–35.7)
MCV RBC AUTO: 84.9 FL (ref 79–97)
METAMYELOCYTES NFR BLD MANUAL: 3 % (ref 0–0)
MONOCYTES # BLD AUTO: 0.55 10*3/MM3 (ref 0.1–0.9)
MYELOCYTES NFR BLD MANUAL: 2 % (ref 0–0)
NEUTROPHILS # BLD AUTO: 0.1 10*3/MM3 (ref 1.7–7)
NEUTROPHILS NFR BLD MANUAL: 8 % (ref 42.7–76)
NITRITE UR QL STRIP: POSITIVE
NRBC SPEC MANUAL: 2 /100 WBC (ref 0–0.2)
PH UR STRIP.AUTO: <=5 [PH] (ref 5–8)
PLAT MORPH BLD: NORMAL
PLATELET # BLD AUTO: 81 10*3/MM3 (ref 140–450)
PMV BLD AUTO: 10.4 FL (ref 6–12)
POTASSIUM SERPL-SCNC: 4.1 MMOL/L (ref 3.5–5.2)
PROCALCITONIN SERPL-MCNC: 0.45 NG/ML (ref 0–0.25)
PROT SERPL-MCNC: 6.7 G/DL (ref 6–8.5)
PROT UR QL STRIP: ABNORMAL
RBC # BLD AUTO: 4.03 10*6/MM3 (ref 3.77–5.28)
RBC # UR: ABNORMAL /HPF
RBC MORPH BLD: NORMAL
REF LAB TEST METHOD: ABNORMAL
RETICS # AUTO: 0.22 10*6/MM3 (ref 0.02–0.13)
RETICS/RBC NFR AUTO: 5.39 % (ref 0.7–1.9)
SODIUM SERPL-SCNC: 140 MMOL/L (ref 136–145)
SP GR UR STRIP: 1.03 (ref 1–1.03)
SQUAMOUS #/AREA URNS HPF: ABNORMAL /HPF
UROBILINOGEN UR QL STRIP: ABNORMAL
VARIANT LYMPHS NFR BLD MANUAL: 14 % (ref 0–5)
WBC # BLD AUTO: 1.22 10*3/MM3 (ref 3.4–10.8)
WBC MORPH BLD: NORMAL
WBC UR QL AUTO: ABNORMAL /HPF
YEAST URNS QL MICRO: ABNORMAL /HPF

## 2021-06-14 PROCEDURE — 85025 COMPLETE CBC W/AUTO DIFF WBC: CPT | Performed by: EMERGENCY MEDICINE

## 2021-06-14 PROCEDURE — 80053 COMPREHEN METABOLIC PANEL: CPT | Performed by: EMERGENCY MEDICINE

## 2021-06-14 PROCEDURE — 83735 ASSAY OF MAGNESIUM: CPT | Performed by: EMERGENCY MEDICINE

## 2021-06-14 PROCEDURE — 36415 COLL VENOUS BLD VENIPUNCTURE: CPT | Performed by: EMERGENCY MEDICINE

## 2021-06-14 PROCEDURE — 82140 ASSAY OF AMMONIA: CPT | Performed by: EMERGENCY MEDICINE

## 2021-06-14 PROCEDURE — 87186 SC STD MICRODIL/AGAR DIL: CPT | Performed by: PHYSICIAN ASSISTANT

## 2021-06-14 PROCEDURE — 83605 ASSAY OF LACTIC ACID: CPT | Performed by: PHYSICIAN ASSISTANT

## 2021-06-14 PROCEDURE — 70450 CT HEAD/BRAIN W/O DYE: CPT

## 2021-06-14 PROCEDURE — 81001 URINALYSIS AUTO W/SCOPE: CPT | Performed by: EMERGENCY MEDICINE

## 2021-06-14 PROCEDURE — 87086 URINE CULTURE/COLONY COUNT: CPT | Performed by: PHYSICIAN ASSISTANT

## 2021-06-14 PROCEDURE — 85046 RETICYTE/HGB CONCENTRATE: CPT | Performed by: INTERNAL MEDICINE

## 2021-06-14 PROCEDURE — 99283 EMERGENCY DEPT VISIT LOW MDM: CPT

## 2021-06-14 PROCEDURE — 85007 BL SMEAR W/DIFF WBC COUNT: CPT | Performed by: EMERGENCY MEDICINE

## 2021-06-14 PROCEDURE — 84145 PROCALCITONIN (PCT): CPT | Performed by: PHYSICIAN ASSISTANT

## 2021-06-14 PROCEDURE — 87077 CULTURE AEROBIC IDENTIFY: CPT | Performed by: PHYSICIAN ASSISTANT

## 2021-06-14 RX ORDER — SULFAMETHOXAZOLE AND TRIMETHOPRIM 800; 160 MG/1; MG/1
1 TABLET ORAL 2 TIMES DAILY
Qty: 10 TABLET | Refills: 0 | Status: SHIPPED | OUTPATIENT
Start: 2021-06-14 | End: 2021-06-19

## 2021-06-14 NOTE — ED PROVIDER NOTES
Brief history of present illness: Dysuria, increasing generalized weakness with difficulty ambulating and headache.  Patient also notes that she is not had frequent bowel movements like usual and it that often indicates her ammonia has gone high.    Physical exam:   ED Triage Vitals   Temp Heart Rate Resp BP SpO2   06/14/21 1404 06/14/21 1404 06/14/21 1404 06/14/21 1528 06/14/21 1404   98.6 °F (37 °C) 106 16 151/74 98 %      Temp src Heart Rate Source Patient Position BP Location FiO2 (%)   06/14/21 1404 06/14/21 1404 -- -- --   Tympanic Monitor        Alert and talkative.  Little bit difficult historian as she seems to speak in circles.  No acute distress and not overtly toxic appearing.  Pink warm well perfused with no respiratory distress or tachypnea noted.  Abdomen soft.    MDM:   Results for orders placed or performed during the hospital encounter of 06/14/21   Comprehensive Metabolic Panel    Specimen: Blood   Result Value Ref Range    Glucose 93 65 - 99 mg/dL    BUN 19 6 - 20 mg/dL    Creatinine 0.96 0.57 - 1.00 mg/dL    Sodium 140 136 - 145 mmol/L    Potassium 4.1 3.5 - 5.2 mmol/L    Chloride 105 98 - 107 mmol/L    CO2 24.6 22.0 - 29.0 mmol/L    Calcium 8.9 8.6 - 10.5 mg/dL    Total Protein 6.7 6.0 - 8.5 g/dL    Albumin 3.70 3.50 - 5.20 g/dL    ALT (SGPT) 29 1 - 33 U/L    AST (SGOT) 47 (H) 1 - 32 U/L    Alkaline Phosphatase 118 (H) 39 - 117 U/L    Total Bilirubin 1.7 (H) 0.0 - 1.2 mg/dL    eGFR Non African Amer 60 (L) >60 mL/min/1.73    Globulin 3.0 gm/dL    A/G Ratio 1.2 g/dL    BUN/Creatinine Ratio 19.8 7.0 - 25.0    Anion Gap 10.4 5.0 - 15.0 mmol/L   Urinalysis With Microscopic If Indicated (No Culture) - Urine, Clean Catch    Specimen: Urine, Clean Catch   Result Value Ref Range    Color, UA Dark Yellow (A) Yellow, Straw    Appearance, UA Cloudy (A) Clear    pH, UA <=5.0 5.0 - 8.0    Specific Gravity, UA 1.029 1.005 - 1.030    Glucose, UA Negative Negative    Ketones, UA Trace (A) Negative    Bilirubin,  UA Negative Negative    Blood, UA Trace (A) Negative    Protein, UA 30 mg/dL (1+) (A) Negative    Leuk Esterase, UA Trace (A) Negative    Nitrite, UA Positive (A) Negative    Urobilinogen, UA 1.0 E.U./dL 0.2 - 1.0 E.U./dL   Magnesium    Specimen: Blood   Result Value Ref Range    Magnesium 1.5 (L) 1.6 - 2.6 mg/dL   CBC Auto Differential    Specimen: Blood   Result Value Ref Range    WBC 1.22 (C) 3.40 - 10.80 10*3/mm3    RBC 4.03 3.77 - 5.28 10*6/mm3    Hemoglobin 11.9 (L) 12.0 - 15.9 g/dL    Hematocrit 34.2 34.0 - 46.6 %    MCV 84.9 79.0 - 97.0 fL    MCH 29.5 26.6 - 33.0 pg    MCHC 34.8 31.5 - 35.7 g/dL    RDW 16.0 (H) 12.3 - 15.4 %    RDW-SD 49.6 37.0 - 54.0 fl    MPV 10.4 6.0 - 12.0 fL    Platelets 81 (L) 140 - 450 10*3/mm3   Retic With IRF & RET-He    Specimen: Blood   Result Value Ref Range    Immature Reticulocyte Fraction 11.1 3.0 - 15.8 %    Reticulocyte % 5.39 (H) 0.70 - 1.90 %    Reticulocyte Absolute 0.2161 (H) 0.0200 - 0.1300 10*6/mm3    Reticulocyte Hgb 31.1 29.8 - 36.1 pg   Manual Differential    Specimen: Blood   Result Value Ref Range    Neutrophil % 8.0 (L) 42.7 - 76.0 %    Lymphocyte % 26.0 19.6 - 45.3 %    Monocyte % 45.0 (H) 5.0 - 12.0 %    Eosinophil % 2.0 0.3 - 6.2 %    Metamyelocyte % 3.0 (H) 0.0 - 0.0 %    Myelocyte % 2.0 (H) 0.0 - 0.0 %    Atypical Lymphocyte % 14.0 (H) 0.0 - 5.0 %    Neutrophils Absolute 0.10 (L) 1.70 - 7.00 10*3/mm3    Lymphocytes Absolute 0.49 (L) 0.70 - 3.10 10*3/mm3    Monocytes Absolute 0.55 0.10 - 0.90 10*3/mm3    Eosinophils Absolute 0.02 0.00 - 0.40 10*3/mm3    nRBC 2.0 (H) 0.0 - 0.2 /100 WBC    RBC Morphology Normal Normal    WBC Morphology Normal Normal    Platelet Morphology Normal Normal   Urinalysis, Microscopic Only - Urine, Clean Catch    Specimen: Urine, Clean Catch   Result Value Ref Range    RBC, UA 0-2 None Seen, 0-2 /HPF    WBC, UA 3-5 (A) None Seen, 0-2 /HPF    Bacteria, UA Trace (A) None Seen /HPF    Squamous Epithelial Cells, UA 13-20 (A) None Seen,  0-2 /HPF    Yeast, UA Small/1+ Budding Yeast None Seen /HPF    Hyaline Casts, UA None Seen None Seen /LPF    Methodology Manual Light Microscopy      *Note: Due to a large number of results and/or encounters for the requested time period, some results have not been displayed. A complete set of results can be found in Results Review.     MDM:  Date of Discharge:  5/4/2021     PCP: Kulwant Martínez APRN     Discharge Diagnosis:         Active Hospital Problems     Diagnosis   POA   • **Gastroparesis [K31.84]   Yes   • Ascites [R18.8]   Yes   • Uncontrolled diabetes mellitus with hyperglycemia (CMS/HCC) [E11.65]   Yes   • BRICE (obstructive sleep apnea) [G47.33]   Yes   • Portal hypertension (CMS/HCC) [K76.6]   Yes   • DUKES (nonalcoholic steatohepatitis) [K75.81]   Yes   • Pancytopenia (CMS/HCC) [D61.818]   Yes       Resolved Hospital Problems     Diagnosis Date Resolved POA   • Intractable vomiting with nausea [R11.2] 05/03/2021 Yes   • Elevated procalcitonin [R79.89] 05/03/2021 Yes   • Dehydration [E86.0] 05/03/2021 Yes   • Seizure (CMS/HCC) [R56.9] 05/03/2021 Yes   • Systemic lupus (CMS/HCC) [M32.9]         Agree with plan for metabolic evaluation in this patient to evaluate for acute life threat.  CT head also is reasonable given her presentation.    I have seen and personally evaluated this patient, discussed the case with the treating advanced practice provider, and reviewed their note. I was involved in the medical decision making during the evaluation, testing and disposition planning for this patient.     Donaldo Mendoza MD  06/14/21 4301

## 2021-06-14 NOTE — ED TRIAGE NOTES
Has not been able to urinate as much as usual and describes urine as dark.  She says her legs are heavy and she can't walk straight.  She has developed a HA.    Patient was placed in face mask during first look triage.  Patient was wearing a face mask throughout encounter.  I wore personal protective equipment throughout the encounter.  Hand hygiene was performed before and after patient encounter.

## 2021-06-14 NOTE — DISCHARGE INSTRUCTIONS
Stay well-hydrated.  Take medications as prescribed until completed.  Follow-up with your PCP in 2 days for recheck.  Return to the emergency department for fever, vomiting and unable to keep your meds down, new or worsening symptoms or any concerns.

## 2021-06-14 NOTE — TELEPHONE ENCOUNTER
Additional information per CMM filled out and faxed to [employer] ([phone]) for determination of coverage.

## 2021-06-14 NOTE — ED PROVIDER NOTES
EMERGENCY DEPARTMENT ENCOUNTER    Room Number:  MONICA/MONICA  Date seen:  6/15/2021  Time seen: 15:48 EDT  PCP: Kulwant Martínez APRN  Historian: patient      HPI:  Chief Complaint: urinary symptoms, generalized weakness    A complete HPI/ROS/PMH/PSH/SH/FH are unobtainable due to: none    Context: Silvia Zabala is a 60 y.o. female who presents to the ED for evaluation of urinary symptoms and generalized weakness that started gradually 3 to 4 days ago and have gradually progressed, moderate in severity, nothing makes it worse or better.  She states that she has urinary urgency and frequency as well as dark urine with some hematuria.  She also reports dysuria and some suprapubic pressure during urination.  She denies any fever, has had some chills which is unusual for her, denies any recent vomiting or abnormal diarrhea.  She does have chronic diarrhea as well as gastroparesis which causes chronic nausea and vomiting but is tolerating p.o. well though has some decreased appetite.  She also states that her legs feel heavy and weak, arms due to a lesser degree.  No localized numbness or weakness.  She denies any speech difficulty or vision changes.  She has had a mild intermittent headache for the last 4 days, has a history of migraines and headaches, states this is mild in comparison to her previous migraines.  She uses a Rollator walker at baseline for at least the last 2 years except for at home which she still uses things around the house to help her get around a steady herself at baseline.  She states with each hospitalization she has had over the years she feels a bit weaker.  She has had 2 hospitalizations in the last few months.        PAST MEDICAL HISTORY  Active Ambulatory Problems     Diagnosis Date Noted   • Cirrhosis of liver (CMS/HCC) 03/08/2016   • Rheumatoid arthritis (CMS/HCC) 03/08/2016   • Anxiety and depression 03/08/2016   • Type 2 diabetes mellitus, uncontrolled, with neuropathy (CMS/HCC) 03/15/2016   •  Fibrositis 03/15/2016   • Change in blood platelet count 03/15/2016   • Pancytopenia (CMS/HCC) 04/12/2016   • Hypersplenism 04/12/2016   • Nausea & vomiting 06/14/2016   • DUKES (nonalcoholic steatohepatitis) 06/14/2016   • Hyperlipidemia    • Abdominal pain 02/14/2017   • Hematemesis 02/15/2017   • Ascites 02/21/2017   • Portal hypertension (CMS/HCC) 02/22/2017   • Secondary esophageal varices without bleeding (CMS/HCC) 02/22/2017   • Esophageal varices with bleeding (CMS/HCC) 03/07/2017   • Gastroparesis 10/17/2017   • 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 (CMS/HCC) 11/17/2017   • Diabetic ketoacidosis without coma associated with type 2 diabetes mellitus (CMS/HCC) 12/16/2017   • Diabetic peripheral neuropathy (CMS/HCC) 12/17/2017   • History of seizure disorder 12/17/2017   • Hepatic encephalopathy (CMS/HCC) 05/08/2018   • Thrombocytopenia (CMS/HCC) 05/16/2018   • Fever 05/17/2018   • Acute ITP (CMS/HCC) 05/18/2018   • Degeneration of lumbosacral intervertebral disc 05/30/2018   • Spondylosis without myelopathy 05/30/2018   • UGI bleed 10/04/2018   • Migraine without aura 10/11/2018   • Urinary retention 10/11/2018   • Uncontrolled diabetes mellitus with hyperglycemia (CMS/HCC) 12/13/2018   • BRICE (obstructive sleep apnea) 12/13/2018   • Gastroparesis 12/13/2018   • Hyperammonemia (CMS/HCC) 12/13/2018   • Hyponatremia 12/13/2018   • Anemia 12/13/2018   • Vitamin D insufficiency 12/16/2018   • Hyperglycemia 01/17/2019   • Iron deficiency anemia 01/22/2019   • Adverse effect of iron or its compound, subsequent encounter 01/22/2019   • Hemorrhage of anus and rectum 02/15/2019   • Vulvar lesion 05/09/2019   • Acute upper GI bleed 05/09/2019   • Acute blood loss anemia 05/14/2019   • Acute hyperkalemia 05/14/2019   • Labial cyst 01/18/2021   • Transaminitis 02/03/2021   • Hyperbilirubinemia 02/03/2021   • Acute gastritis 02/03/2021   • UTI (urinary tract infection), bacterial 02/03/2021   • UTI (urinary tract infection)  02/03/2021   • Cholestatic pruritus 05/20/2020   • Fatty liver disease, nonalcoholic 05/20/2020   • Protein-calorie malnutrition, moderate (CMS/HCC) 05/20/2020   • Hyperglycemia due to type 1 diabetes mellitus (CMS/HCC) 04/04/2021   • Ascites 04/05/2021   • Hospital discharge follow-up 04/12/2021     Resolved Ambulatory Problems     Diagnosis Date Noted   • Systemic lupus (CMS/HCC) 02/22/2017   • Upper GI bleed 04/25/2017   • Abnormal finding of blood chemistry  05/08/2018   • Seizure (CMS/HCC) 05/30/2018   • Intractable vomiting with nausea 10/03/2018   • Dehydration 01/17/2019   • Elevated procalcitonin 04/02/2021   • Intractable vomiting with nausea 04/29/2021     Past Medical History:   Diagnosis Date   • Anxiety    • Arthritis    • Broken shoulder    • Chromosomal abnormality    • Cirrhosis (CMS/HCC)    • Clostridium difficile infection    • Colon polyps    • Depression    • Diabetes mellitus (CMS/HCC)    • Duodenal ulcer with hemorrhage    • Esophageal varices determined by endoscopy (CMS/HCC)    • Fibromyalgia    • Gallbladder disease    • Gastric varices    • Glaucoma    • Granuloma annulare    • H/O B12 deficiency    • H/O Bleeding ulcer    • H/O mixed connective tissue disorder    • Hemorrhoids    • Hiatal hernia    • History of transfusion    • Hx of being hospitalized    • Migraine    • Mitral valve prolapse    • Orthostatic hypotension 02/2019   • Pancreas divisum    • Peptic ulcer disease    • PONV (postoperative nausea and vomiting)    • RA (rheumatoid arthritis) (CMS/HCC)    • Seizure disorder (CMS/HCC)    • Seizures (CMS/HCC)    • TIA (transient ischemic attack) 1984         PAST SURGICAL HISTORY  Past Surgical History:   Procedure Laterality Date   • BARTHOLIN CYST MARSUPIALIZATION Left 1/18/2021    Procedure: EXCISION OF LABIAL CYST;  Surgeon: Melinda Thakkar MD;  Location: [facility] OR;  Service: Obstetrics/Gynecology;  Laterality: Left;   • BLADDER REPAIR  1991   • CATARACT EXTRACTION     •  CHOLECYSTECTOMY  1994   • COLONOSCOPY     • ENDOSCOPY N/A 11/7/2016    Procedure: ESOPHAGOGASTRODUODENOSCOPY WITH COLD POLYPECTOMY, BANDING,  AND CLIPS TIMES 2;  Surgeon: Rich Coleman MD;  Location: [facility] ENDOSCOPY;  Service:    • ENDOSCOPY N/A 12/22/2016    Procedure: ESOPHAGOGASTRODUODENOSCOPY WITH ESOPHAGEAL BANDING. 5 BANDS FIRED, 4 BANDS ADHERERD TO MUCOSA;  Surgeon: Rich Coleman MD;  Location: [facility] ENDOSCOPY;  Service:    • ENDOSCOPY N/A 2/15/2017    Procedure: ESOPHAGOGASTRODUODENOSCOPY AT BEDSIDE with esophageal varices banding (3);  Surgeon: Rich Coleman MD;  Location: [facility] ENDOSCOPY;  Service:    • ENDOSCOPY N/A 4/6/2017    Procedure: ESOPHAGOGASTRODUODENOSCOPY WITH ESOPHAGEAL VARICES BANDING x2;  Surgeon: Rich Coleman MD;  Location: [facility] ENDOSCOPY;  Service:    • ENDOSCOPY N/A 11/24/2017    Procedure: ESOPHAGOGASTRODUODENOSCOPY with biopsies;  Surgeon: Rich Coleman MD;  Location: [facility] ENDOSCOPY;  Service:    • ENDOSCOPY N/A 10/5/2018    Procedure: ESOPHAGOGASTRODUODENOSCOPY;  Surgeon: Rich Coleman MD;  Location: [facility] ENDOSCOPY;  Service: Gastroenterology   • ENDOSCOPY N/A 5/10/2019    Procedure: ESOPHAGOGASTRODUODENOSCOPY AT BEDSIDE WITH VARICEAL LIGATION;  Surgeon: Rich Coleman MD;  Location: [facility] ENDOSCOPY;  Service: Gastroenterology   • ENDOSCOPY N/A 6/28/2019    Procedure: ESOPHAGOGASTRODUODENOSCOPY WITH ESOPHAGEAL BANDING (3 BANDS);  Surgeon: Rich Coleman MD;  Location: [facility] ENDOSCOPY;  Service: Gastroenterology   • ENDOSCOPY N/A 4/3/2021    Procedure: ESOPHAGOGASTRODUODENOSCOPY WITH COLD BIOPSIES & BRUSHINGS;  Surgeon: Endy Vasquez MD;  Location: [facility] ENDOSCOPY;  Service: Gastroenterology;  Laterality: N/A;  PRE- N,V & DYSPHAGIA  POST- GASTRITIS, POSSIBLE FUNGAL ESOPHAGITIS   • ENDOSCOPY AND COLONOSCOPY  2014    Dr. Rich Coleman with findings of candida esophagitis   • EYE SURGERY     • HYSTERECTOMY  1986    partial   • JOINT REPLACEMENT    "  • KNEE SURGERY Right     \"right knee recontstruction\"   • LIVER BIOPSY  2015   • MASS EXCISION     • STOMACH SURGERY     • TIPS PROCEDURE  2020    x2         FAMILY HISTORY  Family History   Problem Relation Age of Onset   • Liver disease Other    • Depression Other    • Heart disease Other    • Hypertension Other    • Diabetes Other    • Breast cancer Other    • Brain cancer Other    • Uterine cancer Other    • Colon cancer Other    • Leukemia Other    • Colon cancer Maternal Aunt    • Hypertension Mother    • Diabetes type II Mother    • Rheum arthritis Mother    • Liver disease Mother         DUKES   • Heart disease Father    • Diabetes type II Father    • Diabetes type II Sister    • Lupus Sister    • Malig Hyperthermia Neg Hx          SOCIAL HISTORY  Social History     Socioeconomic History   • Marital status:      Spouse name: Jose   • Number of children: Not on file   • Years of education: Not on file   • Highest education level: Not on file   Tobacco Use   • Smoking status: Former Smoker     Packs/day: 1.00     Years: 4.00     Pack years: 4.00     Types: Cigarettes     Quit date: 2015     Years since quittin.4   • Smokeless tobacco: Never Used   Vaping Use   • Vaping Use: Never used   Substance and Sexual Activity   • Alcohol use: No   • Drug use: Never   • Sexual activity: Defer         ALLERGIES  Albuterol, Imitrex [sumatriptan], Tramadol, Nsaids, Tylenol [acetaminophen], Zofran [ondansetron hcl], Codeine, Lactulose, and Quinine derivatives        REVIEW OF SYSTEMS  Review of Systems     All systems reviewed and negative except for those discussed in HPI.       PHYSICAL EXAM  ED Triage Vitals   Temp Heart Rate Resp BP SpO2   21 1404 21 1404 21 1404 21 1528 21 1404   98.6 °F (37 °C) 106 16 151/74 98 %      Temp src Heart Rate Source Patient Position BP Location FiO2 (%)   21 1404 21 1404 -- -- --   Tympanic Monitor            GENERAL: not " distressed  HENT: atraumatic normocephalic, edentulous  EYES: no scleral icterus, PERRL, extraocular movements intact  CV: regular rhythm, regular rate  RESPIRATORY: normal effort CTA B  ABDOMEN: soft, nontender nondistended normal bowel sounds no guarding or rigidity, no CVA tenderness  MUSCULOSKELETAL: no deformity.  Full range of motion  NEURO: Neuro: Alert and oriented x3, extraocular motion intact, pupils are equal and round reactive to light, cranial nerves II through XII are grossly intact, normal speech, moves all extremities well, 5 out of 5 strength all 4 extremities, sensation intact light touch all 4 extremities, no ataxia.  Ambulates with a steady gait  SKIN: warm, dry    Vital signs and nursing notes reviewed.          LAB RESULTS  Labs Reviewed   URINE CULTURE - Abnormal; Notable for the following components:       Result Value    Urine Culture >100,000 CFU/mL Gram Negative Bacilli (*)     All other components within normal limits   COMPREHENSIVE METABOLIC PANEL - Abnormal; Notable for the following components:    AST (SGOT) 47 (*)     Alkaline Phosphatase 118 (*)     Total Bilirubin 1.7 (*)     eGFR Non  Amer 60 (*)     All other components within normal limits    Narrative:     GFR Normal >60  Chronic Kidney Disease <60  Kidney Failure <15     URINALYSIS W/ MICROSCOPIC IF INDICATED (NO CULTURE) - Abnormal; Notable for the following components:    Color, UA Dark Yellow (*)     Appearance, UA Cloudy (*)     Ketones, UA Trace (*)     Blood, UA Trace (*)     Protein, UA 30 mg/dL (1+) (*)     Leuk Esterase, UA Trace (*)     Nitrite, UA Positive (*)     All other components within normal limits   MAGNESIUM - Abnormal; Notable for the following components:    Magnesium 1.5 (*)     All other components within normal limits   CBC WITH AUTO DIFFERENTIAL - Abnormal; Notable for the following components:    WBC 1.22 (*)     Hemoglobin 11.9 (*)     RDW 16.0 (*)     Platelets 81 (*)     All other  "components within normal limits   RETIC W IRF & RET-HE - Abnormal; Notable for the following components:    Reticulocyte % 5.39 (*)     Reticulocyte Absolute 0.2161 (*)     All other components within normal limits   MANUAL DIFFERENTIAL - Abnormal; Notable for the following components:    Neutrophil % 8.0 (*)     Monocyte % 45.0 (*)     Metamyelocyte % 3.0 (*)     Myelocyte % 2.0 (*)     Atypical Lymphocyte % 14.0 (*)     Neutrophils Absolute 0.10 (*)     Lymphocytes Absolute 0.49 (*)     nRBC 2.0 (*)     All other components within normal limits   URINALYSIS, MICROSCOPIC ONLY - Abnormal; Notable for the following components:    WBC, UA 3-5 (*)     Bacteria, UA Trace (*)     Squamous Epithelial Cells, UA 13-20 (*)     All other components within normal limits   PROCALCITONIN - Abnormal; Notable for the following components:    Procalcitonin 0.45 (*)     All other components within normal limits    Narrative:     As a Marker for Sepsis (Non-Neonates):     1. <0.5 ng/mL represents a low risk of severe sepsis and/or septic shock.  2. >2 ng/mL represents a high risk of severe sepsis and/or septic shock.    As a Marker for Lower Respiratory Tract Infections that require antibiotic therapy:  PCT on Admission     Antibiotic Therapy             6-12 Hrs later  >0.5                          Strongly Recommended            >0.25 - <0.5             Recommended  0.1 - 0.25                  Discouraged                       Remeasure/reassess PCT  <0.1                         Strongly Discouraged         Remeasure/reassess PCT      As 28 day mortality risk marker: \"Change in Procalcitonin Result\" (>80% or <=80%) if Day 0 (or Day 1) and Day 4 values are available. Refer to http://www.[employer]-pct-calculator.com/    Change in PCT <=80 %   A decrease of PCT levels below or equal to 80% defines a positive change in PCT test result representing a higher risk for 28-day all-cause mortality of patients diagnosed with severe sepsis or " septic shock.    Change in PCT >80 %   A decrease of PCT levels of more than 80% defines a negative change in PCT result representing a lower risk for 28-day all-cause mortality of patients diagnosed with severe sepsis or septic shock.              Results may be falsely decreased if patient taking Biotin.    LACTIC ACID, PLASMA - Normal   AMMONIA - Normal   CBC AND DIFFERENTIAL    Narrative:     The following orders were created for panel order CBC & Differential.  Procedure                               Abnormality         Status                     ---------                               -----------         ------                     CBC Auto Differential[[id number]]        Abnormal            Final result                 Please view results for these tests on the individual orders.       Ordered the above labs and independently reviewed the results.        RADIOLOGY  CT Head Without Contrast   Final Result       No acute intracranial hemorrhage or hydrocephalus. If there is further   clinical concern, MRI could be considered for further evaluation.       This report was finalized on 6/14/2021 5:41 PM by Dr. Crispin Li M.D.              I ordered the above noted radiological studies. Reviewed by me and discussed with radiologist.  See dictation for official radiology interpretation.    PROCEDURES  Procedures        MEDICATIONS GIVEN IN ER  Medications - No data to display          PROGRESS AND CONSULTS    DDX includes but not limited to cystitis, pyelonephritis, sepsis, deconditioning, anemia, dehydration, malnutrition    ED Course as of Jono 15 2017   Mon Jun 14, 2021   1550 Medical chart reviewed.  Patient admitted 4/29/2021 until 5/4/2021 due to gastroparesis and ascites, also noted to have uncontrolled diabetes with hyperglycemia Wong pancytopenia.  She improved with Reglan and other antiemetics as well as scopolamine.    [KA]   1550 Magnesium(!): 1.5 [KA]   1551 History of pancytopenia.  White blood  cell count between 2.3-3.0 over the last 1 month.  Platelets previously in the 50s, 83 3 weeks ago.    [KA]   1753 Procalcitonin(!): 0.45 [KA]   1754 Lactate: 1.6 [KA]   1834 Ammonia: 35 [KA]      ED Course User Index  [KA] Sanjuana Serrano PA     Reassessed the patient, she is resting comfortably.  Many of the symptoms that she has are chronic however she does have signs of an acute urinary tract infection today.  Will treat with Bactrim as an outpatient.  She is afebrile.  She does have pancytopenia with leukopenia today however platelets are improved though still low, very mild anemia.  She is well-appearing and symptomatic for UTI, no findings to suggest sepsis.  I have encouraged her to follow-up with her PCP in 2 days for recheck and return to the ER for fever, vomiting and unable to keep down medications, any new or worsening symptoms.  She is agreeable to plan and stable for discharge.     Reviewed pt's history and workup with Dr. Mendoza.  After a bedside evaluation; they agree with the plan of care      Patient was placed in face mask in first look. Patient was wearing facemask each time I entered the room and throughout our encounter. I wore protective equipment throughout this patient encounter including a face mask, eye shield and gloves. Hand hygiene was performed before donning protective equipment and after removal when leaving the room.        DIAGNOSIS   Diagnosis Plan   1. Acute cystitis with hematuria     Generalized weakness  Non-intractable episodic headache    Follow Up:  Kulwant Martínez APRN  [address] [address]  [phone]    In 2 days        RX:     Medication List      New Prescriptions    sulfamethoxazole-trimethoprim 800-160 MG per tablet  Commonly known as: BACTRIM DS,SEPTRA DS  Take 1 tablet by mouth 2 (Two) Times a Day for 5 days.        Changed    ALPRAZolam 0.5 MG tablet  Commonly known as: XANAX  Take 1 tablet by mouth 2 (Two) Times a Day As Needed for  Anxiety or Sleep.  What changed: how much to take     cholestyramine light 4 g powder  Take 1 packet by mouth 2 (Two) Times a Day.  What changed: how much to take     ferrous sulfate 325 (65 FE) MG tablet  TAKE ONE TABLET BY MOUTH TWICE A DAY  What changed: when to take this     spironolactone 100 MG tablet  Commonly known as: ALDACTONE  TAKE ONE TABLET BY MOUTH DAILY  What changed:   · when to take this  · reasons to take this           Where to Get Your Medications      These medications were sent to [facility] [address] [place] [address] [address] AT [facility] - [phone]  - [phone]   [address] , [facility] [address]    Phone: [phone]   · sulfamethoxazole-trimethoprim 800-160 MG per tablet           Latest Documented Vital Signs:  As of 20:17 EDT  BP- 160/74 HR- 86 Temp- 98.6 °F (37 °C) (Tympanic) O2 sat- 94%       Sanjuana Serrano PA  06/15/21 2018

## 2021-06-16 ENCOUNTER — PRIOR AUTHORIZATION (OUTPATIENT)
Dept: GASTROENTEROLOGY | Facility: CLINIC | Age: 61
End: 2021-06-16

## 2021-06-16 LAB — BACTERIA SPEC AEROBE CULT: ABNORMAL

## 2021-06-18 ENCOUNTER — OFFICE VISIT (OUTPATIENT)
Dept: INTERNAL MEDICINE | Age: 61
End: 2021-06-18

## 2021-06-18 VITALS
BODY MASS INDEX: 28.53 KG/M2 | OXYGEN SATURATION: 99 % | WEIGHT: 181.8 LBS | DIASTOLIC BLOOD PRESSURE: 62 MMHG | HEIGHT: 67 IN | SYSTOLIC BLOOD PRESSURE: 138 MMHG | HEART RATE: 99 BPM | TEMPERATURE: 98 F

## 2021-06-18 DIAGNOSIS — N30.01 ACUTE CYSTITIS WITH HEMATURIA: ICD-10-CM

## 2021-06-18 DIAGNOSIS — M54.16 LUMBAR BACK PAIN WITH RADICULOPATHY AFFECTING LOWER EXTREMITY: Primary | ICD-10-CM

## 2021-06-18 PROCEDURE — G0439 PPPS, SUBSEQ VISIT: HCPCS | Performed by: NURSE PRACTITIONER

## 2021-06-18 NOTE — PROGRESS NOTES
"    I N T E R N A L  M E D I C I LUIS CARLOS E  JOEL MANRIQUEZ      ENCOUNTER DATE:  06/18/2021    Silvia Zabala / 60 y.o. / female        MEDICARE ANNUAL WELLNESS VISIT       Chief Complaint: Medicare Wellness-subsequent        Patient's general assessment of her health since a year ago:     - Compared to one year ago, she feels her physical health is slightly better.    - Compared to one year ago, she feels her mental health is significantly better.      HPI for other active medical problems:     Lumbar pain with weakness an heaviness to bilateral lower extremities. States she is walking crooked. No loss of bowel or bladder. Would like imaging for involvement with lumbar spine.   * The required components of Health Risk Assessment (HRA) that were completed by the patient and/or my staff are contained within this note and in the scanned documents titled \"Health Risk Assessment\" within the media section of the patient's chart in [employer].         HISTORY       Recent Hospitalizations:    Recent hospitalization?: Yes     If YES, location, date, and diagnoses:     · Location: [facility]   · Date: 04/29/21  · Principle Discharge Dx: Gastroparesis   · Secondary Dx: Ascites       Patient Care Team:    Patient Care Team:  Kulwant Martínez APRN as PCP - General (Internal Medicine)  Sukhdeep Mcdowell MD as Consulting Physician (Hematology and Oncology)  Merary Burnett MD as Consulting Physician (Endocrinology)  Kimberly Gray MD as Consulting Physician (Gastroenterology)  Joe Munoz MD as Consulting Physician (Rheumatology)  Melinda Thakkar MD as Consulting Physician (Obstetrics and Gynecology)  Silvestre Montes MD PhD as Consulting Physician (Gastroenterology)  Mina Gonzalez MD as Referring Physician (Hospitalist)      Allergies:  Albuterol, Imitrex [sumatriptan], Tramadol, Nsaids, Tylenol [acetaminophen], Zofran [ondansetron hcl], Codeine, Lactulose, and Quinine " derivatives    Medications:  Current Outpatient Medications on File Prior to Visit   Medication Sig Dispense Refill   • ALPRAZolam (XANAX) 0.5 MG tablet Take 1 tablet by mouth 2 (Two) Times a Day As Needed for Anxiety or Sleep. (Patient taking differently: Take 1 mg by mouth 2 (Two) Times a Day As Needed for Anxiety or Sleep.) 60 tablet 0   • Cholecalciferol (Vitamin D3) 25 MCG (1000 UT) capsule Take 1,000 Units by mouth Daily.     • cholestyramine light 4 g powder Take 1 packet by mouth 2 (Two) Times a Day. (Patient taking differently: Take 8 g by mouth 2 (Two) Times a Day.) 60 packet 5   • Continuous Blood Gluc Sensor (FreeStyle Roseline 14 Day Sensor) misc Use as directed. 2 each 0   • Continuous Blood Gluc Sensor (FreeStyle Roseline Sensor System) 1 Device Every 14 (Fourteen) Days. 2 each 3   • Docusate Sodium (COLACE PO) Take 2 capsules by mouth every night at bedtime.     • ergocalciferol (ERGOCALCIFEROL) 1.25 MG (74309 UT) capsule Vitamin D2 1,250 mcg (50,000 unit) capsule     • esomeprazole (nexIUM) 40 MG capsule Take 1 capsule by mouth Every Morning Before Breakfast. 90 capsule 1   • ferrous sulfate 325 (65 FE) MG tablet TAKE ONE TABLET BY MOUTH TWICE A DAY (Patient taking differently: Take 325 mg by mouth 2 (two) times a day.) 60 tablet 2   • FLUoxetine (PROzac) 10 MG capsule Take 20 mg by mouth Daily.     • furosemide (LASIX) 80 MG tablet Take 80 mg by mouth Daily As Needed (swelling).     • glucose blood test strip by Other route As Needed.     • hydrOXYzine (ATARAX) 25 MG tablet Take 2 tablets by mouth Every Night. 60 tablet 3   • insulin detemir (Levemir FlexTouch) 100 UNIT/ML injection Inject 80 Units under the skin into the appropriate area as directed 2 (Two) Times a Day. 10 pen 5   • insulin lispro (ADMELOG) 100 UNIT/ML injection Inject 70 Units under the skin into the appropriate area as directed 3 (Three) Times a Day Before Meals. 20 mL 12   • Insulin Pen Needle (True Comfort Pro Pen Needles) 32G X 4  MM misc 1 each 2 (Two) Times a Day. 100 each 5   • levETIRAcetam (KEPPRA) 500 MG tablet TAKE ONE TABLET BY MOUTH TWICE A  tablet 3   • lubiprostone (Amitiza) 8 MCG capsule Take 1 capsule by mouth 2 (Two) Times a Day With Meals. 180 capsule 1   • Magnesium Oxide 400 MG capsule Take 400 mg by mouth Every Night. 90 each 1   • metaxalone (Skelaxin) 800 MG tablet Every 8 (Eight) Hours.     • metoclopramide (REGLAN) 10 MG tablet Take 1 tablet by mouth 4 (Four) Times a Day Before Meals & at Bedtime. 120 tablet 1   • mirtazapine (REMERON) 15 MG tablet Take 30 mg by mouth Every Night.     • Multiple Vitamins-Minerals (MULTIVITAMIN WITH MINERALS) tablet tablet Take 1 tablet by mouth Daily.     • OxyCODONE HCl (OXAYDO) 7.5 MG tablet  Take 7.5 mg by mouth Every 8 (Eight) Hours As Needed.     • polyethylene glycol (MIRALAX) powder Take 17 g by mouth Daily As Needed.     • Prasterone (Intrarosa) 6.5 MG insert Insert 1 each into the vagina 2 (Two) Times a Week.     • pregabalin (LYRICA) 75 MG capsule Take 75 mg by mouth 2 (Two) Times a Day.     • riFAXIMin (Xifaxan) 550 MG tablet Take 1 tablet by mouth 2 (Two) Times a Day. 180 tablet 1   • spironolactone (ALDACTONE) 100 MG tablet TAKE ONE TABLET BY MOUTH DAILY (Patient taking differently: Take 100 mg by mouth Daily As Needed (swelling).) 30 tablet 2   • sucralfate (CARAFATE) 1 GM/10ML suspension Take 1 g by mouth 3 (Three) Times a Day.     • sulfamethoxazole-trimethoprim (BACTRIM DS,SEPTRA DS) 800-160 MG per tablet Take 1 tablet by mouth 2 (Two) Times a Day for 5 days. 10 tablet 0   • vitamin B-12 (CYANOCOBALAMIN) 1000 MCG tablet Take 1,000 mcg by mouth Daily.     • Zolpidem Tartrate (AMBIEN PO) Ambien     • promethazine (PHENERGAN) 25 MG tablet Take 25 mg by mouth Every 8 (Eight) Hours As Needed for Nausea or Vomiting.       No current facility-administered medications on file prior to visit.        PFSH:     The following portions of the patient's history were reviewed  and updated as appropriate: Allergies / Current Medications / Past Medical History / Surgical History / Social History / Family History    Problem List:  Patient Active Problem List   Diagnosis   • Cirrhosis of liver (CMS/HCC)   • Rheumatoid arthritis (CMS/HCC)   • Anxiety and depression   • Type 2 diabetes mellitus, uncontrolled, with neuropathy (CMS/HCC)   • Fibrositis   • Change in blood platelet count   • Pancytopenia (CMS/HCC)   • Hypersplenism   • Nausea & vomiting   • DUKES (nonalcoholic steatohepatitis)   • Hyperlipidemia   • Abdominal pain   • Hematemesis   • Ascites   • Portal hypertension (CMS/HCC)   • Secondary esophageal varices without bleeding (CMS/HCC)   • Esophageal varices with bleeding (CMS/HCC)   • Gastroparesis   • 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 (CMS/HCC)   • Diabetic ketoacidosis without coma associated with type 2 diabetes mellitus (CMS/HCC)   • Diabetic peripheral neuropathy (CMS/HCC)   • History of seizure disorder   • Hepatic encephalopathy (CMS/HCC)   • Thrombocytopenia (CMS/HCC)   • Fever   • Acute ITP (CMS/HCC)   • Degeneration of lumbosacral intervertebral disc   • Spondylosis without myelopathy   • UGI bleed   • Migraine without aura   • Urinary retention   • Uncontrolled diabetes mellitus with hyperglycemia (CMS/HCC)   • BRICE (obstructive sleep apnea)   • Gastroparesis   • Hyperammonemia (CMS/HCC)   • Hyponatremia   • Anemia   • Vitamin D insufficiency   • Hyperglycemia   • Iron deficiency anemia   • Adverse effect of iron or its compound, subsequent encounter   • Hemorrhage of anus and rectum   • Vulvar lesion   • Acute upper GI bleed   • Acute blood loss anemia   • Acute hyperkalemia   • Labial cyst   • Transaminitis   • Hyperbilirubinemia   • Acute gastritis   • UTI (urinary tract infection), bacterial   • UTI (urinary tract infection)   • Cholestatic pruritus   • Fatty liver disease, nonalcoholic   • Protein-calorie malnutrition, moderate (CMS/HCC)   • Hyperglycemia due to type 1  diabetes mellitus (CMS/HCC)   • Ascites   • Hospital discharge follow-up       Past Medical History:  Past Medical History:   Diagnosis Date   • Anemia    • Anxiety    • Arthritis    • Broken shoulder     left shoulder    • Chromosomal abnormality     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   • Cirrhosis (CMS/HCC)    • Clostridium difficile infection    • Colon polyps    • Depression    • Diabetes mellitus (CMS/HCC)     Adult onset, insulin requiring   • Duodenal ulcer with hemorrhage    • Esophageal varices determined by endoscopy (CMS/HCC)    • Fibromyalgia    • Gallbladder disease    • Gastric varices    • Gastroparesis    • Glaucoma    • Granuloma annulare    • H/O B12 deficiency    • H/O Bleeding ulcer     And gastroesophageal varices   • H/O mixed connective tissue disorder    • Hemorrhoids    • Hiatal hernia    • History of transfusion     needs bendryl  flushes sensation and temp goes up   • Hx of being hospitalized     In [place] for GI bleeding from ulcer and varices   • Hyperlipidemia    • Migraine    • Mitral valve prolapse    • UDKES (nonalcoholic steatohepatitis) 11/2016   • Orthostatic hypotension 02/2019   • BRICE (obstructive sleep apnea)    • Pancreas divisum    • Pancytopenia (CMS/HCC)     Chronic, due to cirrhosis and hypersplenism   • Peptic ulcer disease     And esophageal varices   • PONV (postoperative nausea and vomiting)    • RA (rheumatoid arthritis) (CMS/HCC)    • Seizure disorder (CMS/HCC)     last 2003   • Seizures (CMS/HCC)    • Systemic lupus (CMS/HCC)    • TIA (transient ischemic attack) 1984       Past Surgical History:  Past Surgical History:   Procedure Laterality Date   • BARTHOLIN CYST MARSUPIALIZATION Left 1/18/2021    Procedure: EXCISION OF LABIAL CYST;  Surgeon: Melinda Thakkar MD;  Location: [facility];  Service: Obstetrics/Gynecology;  Laterality: Left;   • BLADDER REPAIR  1991   • CATARACT  EXTRACTION     • CHOLECYSTECTOMY  1994   • COLONOSCOPY     • ENDOSCOPY N/A 11/7/2016    Procedure: ESOPHAGOGASTRODUODENOSCOPY WITH COLD POLYPECTOMY, BANDING,  AND CLIPS TIMES 2;  Surgeon: Rich Coleman MD;  Location: [facility] ENDOSCOPY;  Service:    • ENDOSCOPY N/A 12/22/2016    Procedure: ESOPHAGOGASTRODUODENOSCOPY WITH ESOPHAGEAL BANDING. 5 BANDS FIRED, 4 BANDS ADHERERD TO MUCOSA;  Surgeon: Rich Coleman MD;  Location: [facility] ENDOSCOPY;  Service:    • ENDOSCOPY N/A 2/15/2017    Procedure: ESOPHAGOGASTRODUODENOSCOPY AT BEDSIDE with esophageal varices banding (3);  Surgeon: Rich Coleman MD;  Location: [facility] ENDOSCOPY;  Service:    • ENDOSCOPY N/A 4/6/2017    Procedure: ESOPHAGOGASTRODUODENOSCOPY WITH ESOPHAGEAL VARICES BANDING x2;  Surgeon: Rich Coleman MD;  Location: [facility] ENDOSCOPY;  Service:    • ENDOSCOPY N/A 11/24/2017    Procedure: ESOPHAGOGASTRODUODENOSCOPY with biopsies;  Surgeon: Rich Coleman MD;  Location: [facility] ENDOSCOPY;  Service:    • ENDOSCOPY N/A 10/5/2018    Procedure: ESOPHAGOGASTRODUODENOSCOPY;  Surgeon: Rich Coleman MD;  Location: [facility] ENDOSCOPY;  Service: Gastroenterology   • ENDOSCOPY N/A 5/10/2019    Procedure: ESOPHAGOGASTRODUODENOSCOPY AT BEDSIDE WITH VARICEAL LIGATION;  Surgeon: Rich Coleman MD;  Location: [facility] ENDOSCOPY;  Service: Gastroenterology   • ENDOSCOPY N/A 6/28/2019    Procedure: ESOPHAGOGASTRODUODENOSCOPY WITH ESOPHAGEAL BANDING (3 BANDS);  Surgeon: Rich Coleman MD;  Location: [facility] ENDOSCOPY;  Service: Gastroenterology   • ENDOSCOPY N/A 4/3/2021    Procedure: ESOPHAGOGASTRODUODENOSCOPY WITH COLD BIOPSIES & BRUSHINGS;  Surgeon: Endy Vasquez MD;  Location: [facility] ENDOSCOPY;  Service: Gastroenterology;  Laterality: N/A;  PRE- N,V & DYSPHAGIA  POST- GASTRITIS, POSSIBLE FUNGAL ESOPHAGITIS   • ENDOSCOPY AND COLONOSCOPY  2014    Dr. Rich Coleman with findings of candida esophagitis   • EYE SURGERY     • HYSTERECTOMY  1986    partial   • JOINT  "REPLACEMENT     • KNEE SURGERY Right     \"right knee recontstruction\"   • LIVER BIOPSY  2015   • MASS EXCISION     • STOMACH SURGERY     • TIPS PROCEDURE  2020    x2       Social History:  Social History     Socioeconomic History   • Marital status:      Spouse name: Jose   • Number of children: Not on file   • Years of education: Not on file   • Highest education level: Not on file   Tobacco Use   • Smoking status: Former Smoker     Packs/day: 1.00     Years: 4.00     Pack years: 4.00     Types: Cigarettes     Quit date: 2015     Years since quittin.5   • Smokeless tobacco: Never Used   Vaping Use   • Vaping Use: Never used   Substance and Sexual Activity   • Alcohol use: No   • Drug use: Never   • Sexual activity: Defer       Family History:  Family History   Problem Relation Age of Onset   • Liver disease Other    • Depression Other    • Heart disease Other    • Hypertension Other    • Diabetes Other    • Breast cancer Other    • Brain cancer Other    • Uterine cancer Other    • Colon cancer Other    • Leukemia Other    • Colon cancer Maternal Aunt    • Hypertension Mother    • Diabetes type II Mother    • Rheum arthritis Mother    • Liver disease Mother         DUKES   • Heart disease Father    • Diabetes type II Father    • Diabetes type II Sister    • Lupus Sister    • Malig Hyperthermia Neg Hx          PATIENT ASSESSMENT     Vitals:  /62   Pulse 99   Temp 98 °F (36.7 °C) (Temporal)   Ht 170.2 cm (67.01\")   Wt 82.5 kg (181 lb 12.8 oz)   LMP  (LMP Unknown)   SpO2 99%   BMI 28.47 kg/m²   BP Readings from Last 3 Encounters:   21 138/62   21 160/74   21 154/80     Wt Readings from Last 3 Encounters:   21 82.5 kg (181 lb 12.8 oz)   21 80.3 kg (177 lb)   05/10/21 79.7 kg (175 lb 9.6 oz)      Body mass index is 28.47 kg/m².    Pain Score    21 1431   PainSc:   7   PainLoc: Back         Review of Systems:    Review of Systems  Constitutional " neg except per HPI   Resp neg  CV neg  Musc lumbar pain with radiculopathy   GI baseline abdominal pain     Physical Exam:    Physical Exam  Constitutional  No distress  Cardiovascular Rate  normal . Rhythm: regular . Heart sounds:  normal  Musc tenderness lumbar spine   Pulmonary/Chest  Effort normal. Breath sounds:  normal  Psychiatric  Alert. Judgment and thought content normal. Mood normal     Reviewed Data:    Labs:   Lab Results   Component Value Date     06/14/2021    K 4.1 06/14/2021    CALCIUM 8.9 06/14/2021    AST 47 (H) 06/14/2021    ALT 29 06/14/2021    BUN 19 06/14/2021    CREATININE 0.96 06/14/2021    CREATININE 0.81 05/24/2021    CREATININE 0.80 05/03/2021    EGFRIFNONA 60 (L) 06/14/2021    EGFRIFAFRI 74 04/22/2021       Lab Results   Component Value Date     (H) 04/22/2021     (H) 03/17/2021    GLU 75 05/06/2019    HGBA1C 8.70 (H) 04/30/2021    HGBA1C 8.80 (H) 04/22/2021    HGBA1C 8.97 (H) 02/04/2021    MICROALBUR 146.4 01/29/2021       Lab Results   Component Value Date    LDL 60 04/22/2021    LDL 63 01/29/2021    LDL 67 05/11/2019    HDL 56 04/22/2021    TRIG 88 04/22/2021    CHOLHDLRATIO 1.9 01/29/2021       Lab Results   Component Value Date    TSH 2.180 04/22/2021    FREET4 1.15 04/22/2021          Lab Results   Component Value Date    WBC 1.22 (C) 06/14/2021    HGB 11.9 (L) 06/14/2021    HGB 11.3 (L) 05/24/2021    HGB 10.9 (L) 05/04/2021    PLT 81 (L) 06/14/2021                 No results found for: PSA    Imaging:          Medical Tests:          Screening for Glaucoma:  Previous screening for glaucoma?: Yes      Hearing Loss Screen:  Finger Rub Hearing Test (right ear): passed  Finger Rub Hearing Test (left ear): passed      Urinary Incontinence Screen:  Episodes of urinary incontinence? : Yes      Depression Screen:  PHQ-2/PHQ-9 Depression Screening 6/18/2021   Little interest or pleasure in doing things 2   Feeling down, depressed, or hopeless 1   Trouble falling or  staying asleep, or sleeping too much 3   Feeling tired or having little energy 3   Poor appetite or overeating 3   Feeling bad about yourself - or that you are a failure or have let yourself or your family down 3   Trouble concentrating on things, such as reading the newspaper or watching television 0   Moving or speaking so slowly that other people could have noticed. Or the opposite - being so fidgety or restless that you have been moving around a lot more than usual 2   Thoughts that you would be better off dead, or of hurting yourself in some way 0   Total Score 17   If you checked off any problems, how difficult have these problems made it for you to do your work, take care of things at home, or get along with other people? Extremely dIfficult        PHQ-2: 3 (Proceed to PHQ-9)    PHQ-9: 15-19 (Moderately Severe Depression)       FUNCTIONAL, FALL RISK, & COGNITIVE SCREENING (Components below):    DATA:    Functional & Cognitive Status 6/18/2021   Do you have difficulty preparing food and eating? Yes   Do you have difficulty bathing yourself, getting dressed or grooming yourself? No   Do you have difficulty using the toilet? No   Do you have difficulty moving around from place to place? Yes   Do you have trouble with steps or getting out of a bed or a chair? Yes   Current Diet Unhealthy Diet   Dental Exam Not up to date   Eye Exam Not up to date   Exercise (times per week) 1 times per week   Current Exercises Include Walking   Do you need help using the phone?  No   Are you deaf or do you have serious difficulty hearing?  No   Do you need help with transportation? Yes   Do you need help shopping? Yes   Do you need help preparing meals?  Yes   Do you need help with housework?  Yes   Do you need help with laundry? Yes   Do you need help taking your medications? No   Do you need help managing money? No   Do you ever drive or ride in a car without wearing a seat belt? No         A) Assessment of Functional  Ability:  (Assessment of ability to perform ADL's (showering/bathing, using toilet, dressing, feeding self, moving self around) and IADL's (use telephone, shop, prepare food, housekeep, do laundry, transport independently, take medications independently, and handle finances)    Degree of functional impairment: MILD (based on assessment noted above)      B) Assessment of Fall Risk:  Fall Risk Assessment was completed, and patient is at moderate risk for falls.       Need for further evaluation of gait, strength, and balance? : No    Timed Up and Go (TUG):   (>= 12 seconds indicates high risk for falling)    Observable abnormalities included: slow tentative pace       C. Assessment of Cognitive Function:    Mini-Cog Test:     1) Registration (3 objects): No   2) Number of objects recalled: 2   3) Clock Draw: Passed? : Yes       Further evaluation required? : No        COUNSELING       A. Identification of Health Risk Factors:    Risk factors include: polypharmacy, increased fall risk and chronic pain      B. Age-Appropriate Screening Schedule:  (Refer to the list below for future screening recommendations based on patient's age, sex and/or medical conditions. Orders for these recommended tests are listed in the plan section. The patient has been provided with a written plan)    Health Maintenance Topics  Health Maintenance   Topic Date Due   • ZOSTER VACCINE (1 of 2) Never done   • PAP SMEAR  Never done   • DIABETIC FOOT EXAM  12/21/2017   • DIABETIC EYE EXAM  05/12/2018   • INFLUENZA VACCINE  08/01/2021   • HEMOGLOBIN A1C  10/30/2021   • URINE MICROALBUMIN  01/29/2022   • LIPID PANEL  04/22/2022   • MAMMOGRAM  05/14/2023   • TDAP/TD VACCINES (2 - Td or Tdap) 11/24/2025       Health Maintenance Topics Due or Over-Due  Health Maintenance Due   Topic Date Due   • ZOSTER VACCINE (1 of 2) Never done   • ANNUAL WELLNESS VISIT  Never done   • PAP SMEAR  Never done   • DIABETIC FOOT EXAM  12/21/2017   • DIABETIC EYE EXAM   05/12/2018         C. Advanced Care Planning:    Advance Care Planning   ACP discussion was held with the patient during this visit. Patient has an advance directive in EMR which is still valid.        D. Patient Self-Management and Personalized Health Advice:    She has been provided with personalized counseling/information (including brochures/handouts) about:     -- optimizing diet/nutrition plans, reducing risk for cardiovascular disease (heart, stroke, vascular), mental health concerns (depression/anxiety) and managing depression/anxiety      She has been recommended for the following preventative services which has been performed today, will be ordered today or ordered/performed on upcoming follow-up visit:     -- vaccination for Shingrix administered  (or recommended at pharmacy)      E. Miscellaneous Items:    -Aspirin use counseling: Contraindicated from taking ASA    -Discussed BMI with her. The BMI is above average; BMI management plan is completed (discussed plans for weight loss)    -Reviewed use of high risk medication in the elderly: YES    -Reviewed for potential of harmful drug interactions in the elderly: YES        WRAP UP       Assessment & Plan:    1) MEDICARE ANNUAL WELLNESS VISIT    2) OTHER MEDICAL CONDITIONS ADDRESSED TODAY:            Problem List Items Addressed This Visit        Genitourinary and Reproductive     UTI (urinary tract infection)    Relevant Orders    Urinalysis With Culture If Indicated - Urine, Clean Catch      Other Visit Diagnoses     Lumbar back pain with radiculopathy affecting lower extremity    -  Primary    Relevant Orders    XR Spine Lumbar 2 or 3 View                    Orders Placed This Encounter   Procedures   • XR Spine Lumbar 2 or 3 View   • Urinalysis With Culture If Indicated - Urine, Clean Catch       Discussion / Summary:  · Recheck urine for UTI with recent ER visit   · Xray lumbar spine with pain and weakness   · Follow-up in 3 months for chronic medical  management.   · Recommended pap with obgyn   · Diabetic foot exam with endo and eye exam schedule for July   · Shingrix too expensive at this time       Medications as of TODAY:              Current Outpatient Medications   Medication Sig Dispense Refill   • ALPRAZolam (XANAX) 0.5 MG tablet Take 1 tablet by mouth 2 (Two) Times a Day As Needed for Anxiety or Sleep. (Patient taking differently: Take 1 mg by mouth 2 (Two) Times a Day As Needed for Anxiety or Sleep.) 60 tablet 0   • Cholecalciferol (Vitamin D3) 25 MCG (1000 UT) capsule Take 1,000 Units by mouth Daily.     • cholestyramine light 4 g powder Take 1 packet by mouth 2 (Two) Times a Day. (Patient taking differently: Take 8 g by mouth 2 (Two) Times a Day.) 60 packet 5   • Continuous Blood Gluc Sensor (FreeStyle Roseline 14 Day Sensor) misc Use as directed. 2 each 0   • Continuous Blood Gluc Sensor (FreeStyle Roseline Sensor System) 1 Device Every 14 (Fourteen) Days. 2 each 3   • Docusate Sodium (COLACE PO) Take 2 capsules by mouth every night at bedtime.     • ergocalciferol (ERGOCALCIFEROL) 1.25 MG (55316 UT) capsule Vitamin D2 1,250 mcg (50,000 unit) capsule     • esomeprazole (nexIUM) 40 MG capsule Take 1 capsule by mouth Every Morning Before Breakfast. 90 capsule 1   • ferrous sulfate 325 (65 FE) MG tablet TAKE ONE TABLET BY MOUTH TWICE A DAY (Patient taking differently: Take 325 mg by mouth 2 (two) times a day.) 60 tablet 2   • FLUoxetine (PROzac) 10 MG capsule Take 20 mg by mouth Daily.     • furosemide (LASIX) 80 MG tablet Take 80 mg by mouth Daily As Needed (swelling).     • glucose blood test strip by Other route As Needed.     • hydrOXYzine (ATARAX) 25 MG tablet Take 2 tablets by mouth Every Night. 60 tablet 3   • insulin detemir (Levemir FlexTouch) 100 UNIT/ML injection Inject 80 Units under the skin into the appropriate area as directed 2 (Two) Times a Day. 10 pen 5   • insulin lispro (ADMELOG) 100 UNIT/ML injection Inject 70 Units under the skin into  the appropriate area as directed 3 (Three) Times a Day Before Meals. 20 mL 12   • Insulin Pen Needle (True Comfort Pro Pen Needles) 32G X 4 MM misc 1 each 2 (Two) Times a Day. 100 each 5   • levETIRAcetam (KEPPRA) 500 MG tablet TAKE ONE TABLET BY MOUTH TWICE A  tablet 3   • lubiprostone (Amitiza) 8 MCG capsule Take 1 capsule by mouth 2 (Two) Times a Day With Meals. 180 capsule 1   • Magnesium Oxide 400 MG capsule Take 400 mg by mouth Every Night. 90 each 1   • metaxalone (Skelaxin) 800 MG tablet Every 8 (Eight) Hours.     • metoclopramide (REGLAN) 10 MG tablet Take 1 tablet by mouth 4 (Four) Times a Day Before Meals & at Bedtime. 120 tablet 1   • mirtazapine (REMERON) 15 MG tablet Take 30 mg by mouth Every Night.     • Multiple Vitamins-Minerals (MULTIVITAMIN WITH MINERALS) tablet tablet Take 1 tablet by mouth Daily.     • OxyCODONE HCl (OXAYDO) 7.5 MG tablet  Take 7.5 mg by mouth Every 8 (Eight) Hours As Needed.     • polyethylene glycol (MIRALAX) powder Take 17 g by mouth Daily As Needed.     • Prasterone (Intrarosa) 6.5 MG insert Insert 1 each into the vagina 2 (Two) Times a Week.     • pregabalin (LYRICA) 75 MG capsule Take 75 mg by mouth 2 (Two) Times a Day.     • riFAXIMin (Xifaxan) 550 MG tablet Take 1 tablet by mouth 2 (Two) Times a Day. 180 tablet 1   • spironolactone (ALDACTONE) 100 MG tablet TAKE ONE TABLET BY MOUTH DAILY (Patient taking differently: Take 100 mg by mouth Daily As Needed (swelling).) 30 tablet 2   • sucralfate (CARAFATE) 1 GM/10ML suspension Take 1 g by mouth 3 (Three) Times a Day.     • sulfamethoxazole-trimethoprim (BACTRIM DS,SEPTRA DS) 800-160 MG per tablet Take 1 tablet by mouth 2 (Two) Times a Day for 5 days. 10 tablet 0   • vitamin B-12 (CYANOCOBALAMIN) 1000 MCG tablet Take 1,000 mcg by mouth Daily.     • Zolpidem Tartrate (AMBIEN PO) Ambien     • promethazine (PHENERGAN) 25 MG tablet Take 25 mg by mouth Every 8 (Eight) Hours As Needed for Nausea or Vomiting.       No current  facility-administered medications for this visit.         FOLLOW-UP:            No follow-ups on file.                 Future Appointments   Date Time Provider Department Center   8/16/2021  2:00 PM [facility]   8/23/2021 10:00 AM LAB CHAIR 4 [facility]   8/30/2021  2:00 PM Daysi Kelley APRN [facility]   8/30/2021  3:20 PM [facility] ONLY [facility]   8/30/2021  3:40 PM Sukhdeep Mcdowell MD [facility]   12/20/2021  2:10 PM [facility]   1/7/2022  2:15 PM Merary Burnett MD [facility]           After Visit Summary (AVS) including the Personalized Prevention  Plan Services (PPPS) was either printed and given to the patient at check-out today and/or sent to [facility] for review.       *Examiner was wearing medical surgical mask, face shield and exam gloves during the entire duration of the visit. Patient was masked the entire time.   Minimum social distance of 6 ft maintained entire visit except if physical contact was necessary as documented.      **Dragon Disclaimer:   Much of this encounter note is an electronic transcription/translation of spoken language to printed text. The electronic translation of spoken language may permit erroneous, or at times, nonsensical words or phrases to be inadvertently transcribed. Although I have reviewed the note for such errors, some may still exist.

## 2021-06-19 LAB
APPEARANCE UR: CLEAR
BACTERIA #/AREA URNS HPF: NORMAL /HPF
BILIRUB UR QL STRIP: NEGATIVE
CASTS URNS QL MICRO: NORMAL /LPF
COLOR UR: YELLOW
EPI CELLS #/AREA URNS HPF: NORMAL /HPF (ref 0–10)
GLUCOSE UR QL: ABNORMAL
HGB UR QL STRIP: NEGATIVE
KETONES UR QL STRIP: NEGATIVE
LEUKOCYTE ESTERASE UR QL STRIP: NEGATIVE
MICRO URNS: ABNORMAL
MICRO URNS: ABNORMAL
NITRITE UR QL STRIP: NEGATIVE
PH UR STRIP: 6.5 [PH] (ref 5–7.5)
PROT UR QL STRIP: NEGATIVE
RBC #/AREA URNS HPF: NORMAL /HPF (ref 0–2)
SP GR UR: 1.03 (ref 1–1.03)
URINALYSIS REFLEX: ABNORMAL
UROBILINOGEN UR STRIP-MCNC: 0.2 MG/DL (ref 0.2–1)
WBC #/AREA URNS HPF: NORMAL /HPF (ref 0–5)

## 2021-06-28 RX ORDER — INSULIN LISPRO 100 [IU]/ML
INJECTION, SOLUTION INTRAVENOUS; SUBCUTANEOUS
Qty: 167 ML | Refills: 1 | Status: SHIPPED | OUTPATIENT
Start: 2021-06-28 | End: 2021-07-02

## 2021-07-12 ENCOUNTER — PRIOR AUTHORIZATION (OUTPATIENT)
Dept: ENDOCRINOLOGY | Age: 61
End: 2021-07-12

## 2021-07-12 NOTE — TELEPHONE ENCOUNTER
Next 8/30/21 goettl  Last 4/22/21 kuriti    Patients humalog is not covered by insurance, do you want to change    Alternatives:  Novolog, tresiba, levemir

## 2021-07-21 ENCOUNTER — TELEPHONE (OUTPATIENT)
Dept: INTERNAL MEDICINE | Age: 61
End: 2021-07-21

## 2021-07-21 NOTE — TELEPHONE ENCOUNTER
Patient needs to get a referral put in for a [facility] urologist     And wanted to speak with provider about this patient phone number is correct in chart

## 2021-07-22 DIAGNOSIS — Z86.69 HISTORY OF SEIZURE DISORDER: ICD-10-CM

## 2021-07-22 DIAGNOSIS — G93.40 ENCEPHALOPATHY: ICD-10-CM

## 2021-07-22 DIAGNOSIS — G43.009 MIGRAINE WITHOUT AURA AND WITHOUT STATUS MIGRAINOSUS, NOT INTRACTABLE: Primary | ICD-10-CM

## 2021-07-22 NOTE — TELEPHONE ENCOUNTER
Pt notified that referral had been placed and the processes of being informed of appt. Pt verbalized understanding. MM

## 2021-07-22 NOTE — TELEPHONE ENCOUNTER
Obtained clarification from pt. Pt stated transplant MD is suggesting NEUR[facility] referral. Md not familiar with [facility] M[facility] and suggested you place this referral. MM

## 2021-08-17 ENCOUNTER — TELEPHONE (OUTPATIENT)
Dept: ENDOCRINOLOGY | Age: 61
End: 2021-08-17

## 2021-08-17 ENCOUNTER — TELEPHONE (OUTPATIENT)
Dept: INTERNAL MEDICINE | Age: 61
End: 2021-08-17

## 2021-08-17 LAB
BUN SERPL-MCNC: 21 MG/DL (ref 6–20)
BUN/CREAT SERPL: 20.6 (ref 7–25)
CALCIUM SERPL-MCNC: 9.2 MG/DL (ref 8.6–10.5)
CHLORIDE SERPL-SCNC: 101 MMOL/L (ref 98–107)
CO2 SERPL-SCNC: 23.3 MMOL/L (ref 22–29)
CREAT SERPL-MCNC: 1.02 MG/DL (ref 0.57–1)
GLUCOSE SERPL-MCNC: 412 MG/DL (ref 65–99)
HBA1C MFR BLD: 8.8 % (ref 4.8–5.6)
POTASSIUM SERPL-SCNC: 4 MMOL/L (ref 3.5–5.2)
SODIUM SERPL-SCNC: 133 MMOL/L (ref 136–145)

## 2021-08-18 NOTE — TELEPHONE ENCOUNTER
Caller: Silvia Zabala    Relationship: Self    Best call back number: [phone] (M)    What is the best time to reach you: ANYTIME    Who are you requesting to speak with (clinical staff, provider,  specific staff member): CLINICAL STAFF    What was the call regarding: PATIENT WOULD LIKE TO DISCUSS THE VACCINE BOOSTER SHOT AND IF SHE SHOULD RECEIVE SHOT    Do you require a callback: YES          
Pt notified of dictation. Pt verbalized understanding. MM  
Statement Selected

## 2021-08-23 ENCOUNTER — APPOINTMENT (OUTPATIENT)
Dept: LAB | Facility: HOSPITAL | Age: 61
End: 2021-08-23

## 2021-08-27 ENCOUNTER — APPOINTMENT (OUTPATIENT)
Dept: LAB | Facility: HOSPITAL | Age: 61
End: 2021-08-27

## 2021-08-30 ENCOUNTER — OFFICE VISIT (OUTPATIENT)
Dept: ENDOCRINOLOGY | Age: 61
End: 2021-08-30

## 2021-08-30 ENCOUNTER — APPOINTMENT (OUTPATIENT)
Dept: LAB | Facility: HOSPITAL | Age: 61
End: 2021-08-30

## 2021-08-30 DIAGNOSIS — E11.42 DIABETIC PERIPHERAL NEUROPATHY (HCC): ICD-10-CM

## 2021-08-30 DIAGNOSIS — K31.84 GASTROPARESIS: ICD-10-CM

## 2021-08-30 DIAGNOSIS — Z79.4 TYPE 2 DIABETES MELLITUS WITH HYPERGLYCEMIA, WITH LONG-TERM CURRENT USE OF INSULIN (HCC): Primary | ICD-10-CM

## 2021-08-30 DIAGNOSIS — E11.65 TYPE 2 DIABETES MELLITUS WITH HYPERGLYCEMIA, WITH LONG-TERM CURRENT USE OF INSULIN (HCC): Primary | ICD-10-CM

## 2021-08-30 PROCEDURE — 99214 OFFICE O/P EST MOD 30 MIN: CPT | Performed by: NURSE PRACTITIONER

## 2021-08-30 RX ORDER — ALPRAZOLAM 1 MG/1
TABLET ORAL
COMMUNITY
Start: 2021-07-16 | End: 2021-09-20 | Stop reason: SDUPTHER

## 2021-08-30 RX ORDER — OXYCODONE AND ACETAMINOPHEN 7.5; 325 MG/1; MG/1
1 TABLET ORAL
COMMUNITY
End: 2021-09-20

## 2021-08-30 RX ORDER — INSULIN DETEMIR 100 [IU]/ML
80 INJECTION, SOLUTION SUBCUTANEOUS 2 TIMES DAILY
Qty: 10 PEN | Refills: 5 | Status: SHIPPED | OUTPATIENT
Start: 2021-08-30 | End: 2021-10-07

## 2021-08-30 RX ORDER — HYDROCODONE BITARTRATE AND ACETAMINOPHEN 5; 325 MG/1; MG/1
1 TABLET ORAL
COMMUNITY
End: 2021-09-20

## 2021-08-30 RX ORDER — ZINC GLUCONATE 50 MG
50 TABLET ORAL DAILY
COMMUNITY
Start: 2021-06-28 | End: 2022-06-28

## 2021-08-30 NOTE — PROGRESS NOTES
"Chief Complaint  Diabetes (freestyle )     You have chosen to receive care through a telephone visit. Do you consent to use a telephone visit for your medical care today? Yes      Subjective          Silvia Zabala presents to [facility]  History of Present Illness     I have reviewed PMH, allergies and medications UTD at this visit     [place] so she has multiple ongoing complicated medical history     Type 2 dm   Diagnosed in her 20's    Today in clinic pt reports being on levemir 60 units bid, admelog 60 units tid ac  She is scared to adjust her insulin r/t gastroparesis and fear of hypoglycemia      FBG - 200s - 300s  Pre meals - 200s-400s  Checks BG - 4 - 5 times a day  Sensor - Freestyle jesusita  Dm retinopathy - yes, has f/u in 6 months, Last eye exam - just within the last few weeks   Dm nephropathy - no  Dm neuropathy - yes, reports daily diabetic foot care Dm neuropathy meds - on lyrica  CAD -no  CVA - ? TIA  Episodes of hypoglycemia - none within the last couple of weeks    Pt is not physically active. weight has been stable.   Pt tries to follow DM diet for most part.   On Ace inb.  Checking ketones regularly and reports negative      Pancreatic divisum : No hx of pancreatitis per pt.       Liver disease/DUKES -  Sees Dr. Jordan from [facility].       Gastroparesis - Sees Dr. Coleman. Recently admitted in April 2021 with this.       ITP - diagnosed in May 2018. Currently stable. Seen by Hematology and oncology.       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       Objective   Vital Signs:   There were no vitals taken for this visit.    Physical Exam   Alert and oriented   No apparent distress    Result Review :   The following data was reviewed by: JOEL Cuellar on 08/30/2021:  Common labs    Common Labsle 5/24/21 5/24/21 6/14/21 6/14/21 8/16/21 8/16/21    0727 0727 1512 1512 1419 1419   Glucose  679 (A)  93     Glucose     412 (A)    BUN  18  19 21 (A)  "   Creatinine  0.81  0.96 1.02 (A)    eGFR Non African Am  73  60 (A) 55 (A)    eGFR African Am     67    Sodium  130 (A)  140 133 (A)    Potassium  4.1  4.1 4.0    Chloride  95 (A)  105 101    Calcium  9.2  8.9 9.2    Albumin  3.50  3.70     Total Bilirubin  1.3 (A)  1.7 (A)     Alkaline Phosphatase  155 (A)  118 (A)     AST (SGOT)  27  47 (A)     ALT (SGPT)  27  29     WBC 3.08 (A)  1.22 (A)      Hemoglobin 11.3 (A)  11.9 (A)      Hematocrit 32.6 (A)  34.2      Platelets 83 (A)  81 (A)      Hemoglobin A1C      8.80 (A)   (A) Abnormal value       Comments are available for some flowsheets but are not being displayed.                     Assessment and Plan    Diagnoses and all orders for this visit:    1. Type 2 diabetes mellitus with hyperglycemia, with long-term current use of insulin (CMS/[facility]) (Primary)    2. Gastroparesis    3. Diabetic peripheral neuropathy (CMS/[facility])    Other orders  -     insulin detemir (Levemir FlexTouch) 100 UNIT/ML injection; Inject 80 Units under the skin into the appropriate area as directed 2 (Two) Times a Day.  Dispense: 10 pen; Refill: 5  -     NovoLOG 100 UNIT/ML injection; Inject 60 units under the skin  3 times daily  With meals  Dispense: 45 mL; Refill: 0        Follow Up   Return in about 3 months (around 11/30/2021).     High risk of diabetic complications given her complicated medical history  Patient is doing her best to adjust her insulin to alleviate the hyperglycemia while avoiding hypoglycemia.   She reports currently she is well-hydrated  Continue jesusita  Goal A1c around 7.5%  She is testing her ketones regularly to hopefully catch DKA before needing emergent care  She continues to see GI specialist regularly  She continues to check her blood sugars regularly  Currently her A1c is higher than target but she is afraid that increasing her insulin will cause hypoglycemia  A1c has been steady this year with the highest A1c in July 2019  Levemir was increased to 80 units in the  morning last visit however the patient reports she cannot tolerate this dose, suggested slowly increasing the morning dose to help with hyperglycemia      Patient was given instructions and counseling regarding her condition or for health maintenance advice. Please see specific information pulled into the AVS if appropriate.     JOEL Cuellar  20 minute phone call

## 2021-09-01 ENCOUNTER — TELEPHONE (OUTPATIENT)
Dept: PHARMACY | Facility: HOSPITAL | Age: 61
End: 2021-09-01

## 2021-09-08 NOTE — CONSULTS
Orthopedic Consult      Patient: Silvia Zabala  YOB: 1960     Date of Admission: 12/13/2018  6:26 PM    Medical Record Number: [id number]    Attending Physician: Jose Almanza MD    Consulting Physician: MD Darell    Reason for Consult: Left Shoulder pain - 4 week old Proximal humerus fracture.     History of Present Illness: 58 y.o. female admitted to [facility] with Anasarca [R60.1]  DUKES (nonalcoholic steatohepatitis) [K75.81]  Closed 2-part displaced fracture of surgical neck of left humerus, initial encounter [S42.222A]  Type 2 diabetes mellitus with hyperglycemia, with long-term current use of insulin (CMS/[facility]) [E11.65, Z79.4]  Depression, unspecified depression type [F32.9].     The patient was admitted for generalised swelling of her body  and was evaluated for  a left proximal humerus fracture.      Secondary to the age and multiple medical co morbidities the patient was admitted to the hospitalist.   As I was on call for the emergency room I was consulted for further evaluation and treatment.     She states that she fell off her bed approximately 4 weeks ago while in [place]. She was diagnosed at that time with a proximal humerus fracture. She was placed into a sling and swathe at that time. She was doing better with regards to her pain, however 3-4 days ago she fell into a wall as a result of baseline gait instability. She had increased pain in the left shoulder and subsequently presented to the ED. Currently the pt reports the pain to be localized to the shoulder without radiation. She denies numbness or paresthesias. She had a hx of left shoulder pain prior to her index fall 4 weeks ago, when she saw Dr. Crispin chow and was diagnosed with rotator cuff pathology. She denies any personal hx of cancer. She has had weight fluctuations, however this is related to her ascites.      Denies any history of fevers, chills, gout, other joint pains.     The patient is alone at the  PHYSICIAN NEXT STEPS:  Call the Patient    CHIEF COMPLAINT:  Chief Complaint/Protocol Used: Chest Pain (A)  Onset: 9-1-2021      ASSESSMENT:  ? Onset: 9-1-2021  ? Normal True  ? Normal, no trouble breathing True  ? Location: Left piercing   ? Onset: 9-1-2021  ? PATTERN \"Does the pain come and go, or has it been constant since it started?\" \"Does it get worse with exertion?\" Came and went   ? Duration: 5 minutes   ? Severity: Mild   ? Cardiac Risk Factors: Hypertension 148/80  ? Cause: ?  ? Other Symptoms: Difficulty breathing the following day and dizziness the week before   -------------------------------------------------------    DISPOSITION:  Disposition Recommendation: See PCP Within 24 Hours  Questions that led to disposition:  ? [1] Chest pain lasts > 5 minutes AND [2] occurred > 3 days ago (72 hours) AND [3] NO chest pain or cardiac symptoms now  Patient Directed To: Unspecified  Patient Intended Action: Seek care in the doctor's office       CALL NOTES:  09/08/2021 at 10:55 AM by Mariah Henry  ? Appt Friday at 0840     DISPOSITION OVERRIDE/PROVIDER CONSULT:  Disposition Override: N/A  Override Source: Unspecified  Consulted with PCP: No  Consulted with On-Call Physician: No    CALLER CONTACT INFO:  Name: Socorro Diez (Self)  Phone 1: [phone] (Home Phone)  Phone 2: [phone] (Work Phone)  Phone 3: [phone] (Mobile) - Preferred      ENCOUNTER STARTED:  09/08/21 10:39:56 AM  ENCOUNTER ASSIGNED TO/CLOSED BY:  Mariah Henry @ 09/08/21 10:56:31 AM      -------------------------------------------------------    CARE ADVICE given per Chest Pain (A) guideline.  SEE PCP WITHIN 24 HOURS:   * IF OFFICE WILL BE OPEN: You need to be examined within the next 24 hours. Call your doctor (or NP/PA) when the office opens and make an appointment.  * IF OFFICE WILL BE CLOSED: You need to be seen within the next 24 hours. A clinic or an urgent care center is often a good source of care if your doctor's office is  closed or you can't get an appointment.  * IF PATIENT HAS NO PCP: Refer patient to a clinic or urgent care center. Also try to help caller find a PCP for future care.    NOTE TO TRIAGER:  * Use nurse judgment to select the most appropriate source of care.  * Consider both the urgency of the patient's symptoms AND what resources may be needed to evaluate and manage the patient.; CALL BACK IF:  * Difficulty breathing or unusual sweating occurs  * Chest pain increases in frequency, duration or severity  * Chest pain lasts over 5 minutes  * You become worse.      UNDERSTANDS CARE ADVICE: Yes    AGREES WITH CARE ADVICE: No    WILL FOLLOW CARE ADVICE: No    -------------------------------------------------------   time of this hospital visit.     Patient is a  home/ community ambulator. Patient denies/ uses walker/cane assistive device.     The patient  lives at home with   , is active and independent in activities of daily living, but does have difficulty with ambulation due to her gait instability.     The patient denies history of dementia.    Past medical history, Past surgical history, family history, Social history, current medications, home medications Have been reviewed by me.    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   • Cirrhosis (CMS/HCC)    • Colon polyps    • Deafness    • Depression    • Diabetes mellitus (CMS/HCC)     Adult onset, insulin requiring   • Duodenal ulcer with hemorrhage    • Esophageal varices determined by endoscopy (CMS/HCC)    • Fibromyalgia    • Gallbladder disease    • Gastric varices    • Gastroparesis    • Glaucoma    • Granuloma annulare    • H/O B12 deficiency    • H/O Bleeding ulcer     And gastroesophageal varices   • H/O mixed connective tissue disorder    • Hemorrhoids    • Hiatal hernia    • History of transfusion    • Hx of being hospitalized     In [place] for GI bleeding from ulcer and varices   • Hyperlipidemia    • Migraine    • DUKES (nonalcoholic steatohepatitis) 11/2016   • BRICE (obstructive sleep apnea)    • Pancreas divisum    • Pancytopenia (CMS/HCC)     Chronic, due to cirrhosis and hypersplenism   • Peptic ulcer disease     And esophageal varices   • PONV (postoperative nausea and vomiting)    • RA (rheumatoid arthritis) (CMS/HCC)    • Seizure disorder (CMS/HCC)     LAST ONE SEVERAL YEARS AGO   • Seizures (CMS/HCC)    • Systemic lupus (CMS/HCC)      Past Surgical History:   Procedure Laterality Date   • BLADDER REPAIR  1991   • CATARACT  "EXTRACTION     • CHOLECYSTECTOMY     • ENDOSCOPY AND COLONOSCOPY      Dr. Rich Coleman with findings of candida esophagitis   • EYE SURGERY     • HYSTERECTOMY      partial   • JOINT REPLACEMENT     • KNEE SURGERY Right     \"right knee recontstruction\"   • LIVER BIOPSY  2015   • MASS EXCISION     • STOMACH SURGERY       Social History     Occupational History     Employer: DISABLED   Tobacco Use   • Smoking status: Former Smoker     Packs/day: 0.00     Years: 0.00     Pack years: 0.00     Last attempt to quit: 2015     Years since quittin.9   • Smokeless tobacco: Never Used   Substance and Sexual Activity   • Alcohol use: No   • Drug use: No   • Sexual activity: Defer    Social History     Social History Narrative    Moved to [place] from [place]. She is currently medically disabled.     Family History   Problem Relation Age of Onset   • Liver disease Other    • Depression Other    • Heart disease Other    • Hypertension Other    • Diabetes Other    • Breast cancer Other    • Brain cancer Other    • Uterine cancer Other    • Colon cancer Other    • Leukemia Other    • Colon cancer Maternal Aunt    • Hypertension Mother    • Diabetes type II Mother    • Rheum arthritis Mother    • Liver disease Mother         DUKES   • Heart disease Father    • Diabetes type II Father    • Diabetes type II Sister    • Lupus Sister    • Malig Hyperthermia Neg Hx           Allergies   Allergen Reactions   • Albuterol Anaphylaxis   • Tramadol Nausea Only, Other (See Comments) and GI Intolerance     Per pt causes her \"palsy, meaning shaking and tremors\"    • Lactulose Nausea And Vomiting and Other (See Comments)     Severe abdominal pain   • Quinine Derivatives Nausea And Vomiting        Home Medications:  Medications Prior to Admission   Medication Sig Dispense Refill Last Dose   • ALPRAZolam (XANAX) 0.5 MG tablet Take 1 tablet by mouth 2 (Two) Times a Day As Needed for Anxiety. 60 tablet 1 Taking   • " amitriptyline (ELAVIL) 50 MG tablet Take 1 tablet by mouth Every Night. 30 tablet 0 Taking   • B-D UF III MINI PEN NEEDLES 31G X 5 MM misc USE TO INJECT 5 TO 6 TIMES PER  each 2 Taking   • Continuous Blood Gluc Sensor (FREESTYLE YESIKA SENSOR SYSTEM) 1 Device Every 14 (Fourteen) Days. 2 each 3    • cyclobenzaprine (FLEXERIL) 5 MG tablet Take 5 mg by mouth every night at bedtime.      • DULoxetine (CYMBALTA) 30 MG capsule Take 90 mg by mouth Daily.      • folic acid (FOLVITE) 1 MG tablet Take 1 mg by mouth Daily.      • furosemide (LASIX) 40 MG tablet Take 1 tablet by mouth Daily. 30 tablet 2 Taking   • hydrOXYzine (ATARAX) 25 MG tablet Take 50 mg by mouth 3 (Three) Times a Day As Needed for Itching.   Taking   • insulin regular (HUMULIN R) 500 UNIT/ML CONCENTRATED injection Inject 65 Units under the skin into the appropriate area as directed 3 (Three) Times a Day With Meals. And SS: 3 units for every 50 above 150      • insulin regular (HUMULIN R) 500 UNIT/ML CONCENTRATED injection Inject 30 Units under the skin into the appropriate area as directed 2 (Two) Times a Day. WITH SNACKS AND SS      • levETIRAcetam (KEPPRA) 500 MG tablet Take 500 mg by mouth 2 (Two) Times a Day.      • Multiple Vitamins-Minerals (MULTIVITAMIN WITH MINERALS) tablet tablet Take 1 tablet by mouth Daily.      • OxyCODONE HCl (OXAYDO) 7.5 MG tablet  Take 7.5 mg by mouth Every 8 (Eight) Hours As Needed.   Taking   • pantoprazole (PROTONIX) 40 MG EC tablet Take 40 mg by mouth Daily.      • pregabalin (LYRICA) 75 MG capsule Take 75 mg by mouth 2 (Two) Times a Day.      • promethazine (PHENERGAN) 25 MG tablet Take 25 mg by mouth Every 6 (Six) Hours As Needed for Nausea or Vomiting.      • rifaximin (XIFAXAN) 550 MG tablet Take 550 mg by mouth 2 (Two) Times a Day.      • spironolactone (ALDACTONE) 100 MG tablet Take 100 mg by mouth Daily.      • sucralfate (CARAFATE) 1 g tablet Take 1 g by mouth 2 (Two) Times a Day.      • trimethobenzamide  "(TIGAN) 300 MG capsule Take 300 mg by mouth 3 (Three) Times a Day.   Taking   • vitamin B-12 (CYANOCOBALAMIN) 1000 MCG tablet Take 1,000 mcg by mouth daily.   Taking       Current Medications:  Scheduled Meds:  amitriptyline 50 mg Oral Nightly   cyclobenzaprine 5 mg Oral Nightly   DULoxetine 90 mg Oral Daily   folic acid 1 mg Oral Daily   furosemide 40 mg Oral Daily   insulin lispro 0-24 Units Subcutaneous 4x Daily With Meals & Nightly   Insulin Regular Human (Conc) 65 Units Subcutaneous TID With Meals   levETIRAcetam 500 mg Oral Q12H   multivitamin with minerals 1 tablet Oral Daily   pantoprazole 40 mg Oral QAM   pregabalin 75 mg Oral Q12H   rifaximin 550 mg Oral BID   sodium chloride 3 mL Intravenous Q12H   spironolactone 100 mg Oral Daily   sucralfate 1 g Oral BID   trimethobenzamide 300 mg Oral TID   vitamin B-12 1,000 mcg Oral Daily     Continuous Infusions:   PRN Meds:.•  ALPRAZolam  •  dextrose  •  dextrose  •  glucagon (human recombinant)  •  hydrOXYzine  •  ondansetron **OR** ondansetron ODT **OR** ondansetron  •  oxyCODONE  •  promethazine  •  sodium chloride  •  [COMPLETED] Insert peripheral IV **AND** sodium chloride    Review of Systems:   A 12 point system review was reviewed with the patient and from the chart  and is negative except as mentioned in history of present illness.      Physical Exam: 58 y.o. female                    Vitals:    12/13/18 2002 12/13/18 2033 12/13/18 2337 12/14/18 0723   BP: 138/74 146/73 151/80 143/68   BP Location:   Right arm Right arm   Patient Position:   Sitting Lying   Pulse: 98 98 107 102   Resp:   20 16   Temp:   98.7 °F (37.1 °C) 98.4 °F (36.9 °C)   TempSrc:   Oral Oral   SpO2: 100% 99% 100% 99%   Weight:   85.4 kg (188 lb 4.4 oz)    Height:   168.9 cm (66.5\")         Gait: Not evaluated.     Mental/HEENT/cardio/skin: The patient's general appearance was well-nourished, well-hydrated, no acute distress.  Orientation was alert and oriented ×3.  The patient's mood " was normal.   Pulmonary exam shows normal late exchange, no labored breathing, or shortness of breath.    The  skin exam showed normal temperature and color in the area of examination.    Extremities:  LUE  Sling and swathe in place, no gross deformity  ROM of shoulder restricted due to known fracture  Motor intact in AIN/PIN/Median/Ulnar/Radial/Axillary  Sensation intact in Median/Ulnar/Radial/Axilary  +Radial pulse, brisk cap refill       Diagnostic Tests:    Results from last 7 days   Lab Units  12/14/18   0448  12/13/18   1900   WBC 10*3/mm3  3.15*  2.88*   HEMOGLOBIN g/dL  9.6*  10.1*   HEMATOCRIT %  30.4*  34.0*   PLATELETS 10*3/mm3  100*  99*     Results from last 7 days   Lab Units  12/14/18   0448  12/13/18   1900   SODIUM mmol/L  135*  131*   POTASSIUM mmol/L  3.8  4.8   CHLORIDE mmol/L  99  95*   CO2 mmol/L  22.5  21.9*   BUN mg/dL  9  11   CREATININE mg/dL  0.61  0.87   GLUCOSE mg/dL  225*  608*   CALCIUM mg/dL  9.0  8.9         Lab Results   Component Value Date    URICACID 2.9 04/12/2016     No results found for: CRYSTAL  Microbiology Results (last 10 days)     ** No results found for the last 240 hours. **        X-rays of the left shoulder were reviewed and demonstrated a proximal humerus fracture that appears subacute with evidence of callus formation.     The labs, X-ray results for preoperative evaluation have been reviewed by me.    Assessment: 59 yo RHD F with subacute left proximal humerus fracture    Patient Active Problem List   Diagnosis   • Cirrhosis of liver (CMS/HCC)   • Rheumatoid arthritis (CMS/HCC)   • Anxiety and depression   • Type 2 diabetes mellitus, uncontrolled, with neuropathy (CMS/HCC)   • Fibrositis   • Change in blood platelet count   • Pancytopenia (CMS/HCC)   • Hypersplenism   • Nausea   • DUKES (nonalcoholic steatohepatitis)   • Hyperlipidemia   • Abdominal pain   • Hematemesis   • Ascites   • Portal hypertension (CMS/HCC)   • Systemic lupus (CMS/HCC)   • Secondary  esophageal varices without bleeding (CMS/HCC)   • Gastroparesis   • Cirrhosis of liver with ascites (CMS/HCC)   • Diabetic ketoacidosis without coma associated with type 2 diabetes mellitus (CMS/HCC)   • Diabetic peripheral neuropathy (CMS/HCC)   • History of seizure disorder   • Hepatic encephalopathy (CMS/HCC)   • Abnormal finding of blood chemistry    • Thrombocytopenia (CMS/HCC)   • Fever   • Acute ITP (CMS/HCC)   • Degeneration of lumbosacral intervertebral disc   • Seizure (CMS/HCC)   • Spondylosis without myelopathy   • Intractable vomiting with nausea   • UGI bleed   • Migraine without aura   • Urinary retention   • Type 2 diabetes mellitus with hyperglycemia, with long-term current use of insulin (CMS/HCC)   • BRICE (obstructive sleep apnea)   • Gastroparesis   • Hyperammonemia (CMS/HCC)   • Hyponatremia   • Anemia       Plan:    Options and alternatives were discussed in detail with the patient.   We have discussed nonoperative treatment/ reverse total shoulder replacement. In either case she is likely to have some restricted range of motion of the left shoulder permanent.   At this time, given the chronicity of her injury, we are recommending continued non operative management. I suspect she may have sustained a refracture through her previous fracture site as a result of her fall into the wall 3-4 days ago. Will maintain her in a sling and swathe for comfort. Plan to transition out of the immobilization over the next 2 weeks to begin AROM of the shoulder.     The radiology report suggests a possible pathologic fracture, but appears to  Me likely changes due to healing fracture.     Date: 12/14/2018    Artemio Eng MD     CC: Blanca Pitts MD; MD Archie Blackman Stephen A, MD     Addendum:    I have personally examined this patient. I agree with the above findings and treatment  plan. I will sign off but will be available. Patient is advised to follow up with me in the office in 3-4 weeks.      Taz Lozoya MD  12/16/2018

## 2021-09-13 ENCOUNTER — TELEPHONE (OUTPATIENT)
Dept: ENDOCRINOLOGY | Age: 61
End: 2021-09-13

## 2021-09-20 ENCOUNTER — OFFICE VISIT (OUTPATIENT)
Dept: INTERNAL MEDICINE | Age: 61
End: 2021-09-20

## 2021-09-20 ENCOUNTER — HOSPITAL ENCOUNTER (OUTPATIENT)
Dept: GENERAL RADIOLOGY | Facility: HOSPITAL | Age: 61
Discharge: HOME | End: 2021-09-20
Admitting: NURSE PRACTITIONER

## 2021-09-20 VITALS
SYSTOLIC BLOOD PRESSURE: 130 MMHG | TEMPERATURE: 97.7 F | HEIGHT: 67 IN | WEIGHT: 193 LBS | HEART RATE: 90 BPM | DIASTOLIC BLOOD PRESSURE: 60 MMHG | BODY MASS INDEX: 30.29 KG/M2 | OXYGEN SATURATION: 99 %

## 2021-09-20 DIAGNOSIS — R09.81 SINUS CONGESTION: ICD-10-CM

## 2021-09-20 DIAGNOSIS — R06.02 SHORTNESS OF BREATH: Primary | ICD-10-CM

## 2021-09-20 PROCEDURE — 71046 X-RAY EXAM CHEST 2 VIEWS: CPT

## 2021-09-20 PROCEDURE — 99213 OFFICE O/P EST LOW 20 MIN: CPT | Performed by: NURSE PRACTITIONER

## 2021-09-20 RX ORDER — AZELASTINE 1 MG/ML
2 SPRAY, METERED NASAL 2 TIMES DAILY
Qty: 30 ML | Refills: 12 | Status: SHIPPED | OUTPATIENT
Start: 2021-09-20 | End: 2022-02-08

## 2021-09-20 RX ORDER — CHOLESTYRAMINE LIGHT 4 G/5.7G
4 POWDER, FOR SUSPENSION ORAL 2 TIMES DAILY
COMMUNITY
End: 2021-09-20

## 2021-09-20 RX ORDER — ZOLPIDEM TARTRATE 5 MG/1
5 TABLET ORAL NIGHTLY
COMMUNITY

## 2021-09-20 NOTE — PROGRESS NOTES
"    I N T E R N A L  M E D I C I N E  SAGE BENSON, APRN      ENCOUNTER DATE:  2021    Silvia STANFORD Vj / 61 y.o. / female      CHIEF COMPLAINT / REASON FOR OFFICE VISIT     Headache (s/p ICC, Neg Covid, S/S present x1 month), Shortness of Breath, Fatigue, and Earache      ASSESSMENT & PLAN     1. Shortness of breath  -Multiple comorbidities, chest x-ray  - XR Chest PA & Lateral    2. Sinus congestion  -Continue Flonase 2 sprays twice daily with addition of azelastine    Orders Placed This Encounter   Procedures   • XR Chest PA & Lateral     New Medications Ordered This Visit   Medications   • azelastine (ASTELIN) 0.1 % nasal spray     Si sprays into the nostril(s) as directed by provider 2 (Two) Times a Day. Use in each nostril as directed     Dispense:  30 mL     Refill:  12       SUMMARY/DISCUSSION  • Follow-up if symptoms do not improve or worsen, earlier dependent on chest x-ray  • Suspect likely new onset by mental allergies with recent move to [facility]      Next Appointment with me: Visit date not found    Return in about 9 months (around 2022) for Medicare Wellness.      VITAL SIGNS     Visit Vitals  /60 (Cuff Size: Adult)   Pulse 90   Temp 97.7 °F (36.5 °C) (Temporal)   Ht 170.2 cm (67\")   Wt 87.5 kg (193 lb)   LMP  (LMP Unknown)   SpO2 99%   BMI 30.23 kg/m²       @BP@  Wt Readings from Last 3 Encounters:   21 87.5 kg (193 lb)   21 83.9 kg (185 lb)   21 82.5 kg (181 lb 12.8 oz)     Body mass index is 30.23 kg/m².      MEDICATIONS AT THE TIME OF OFFICE VISIT     Current Outpatient Medications on File Prior to Visit   Medication Sig   • ALPRAZolam (XANAX) 0.5 MG tablet Take 1 tablet by mouth 2 (Two) Times a Day As Needed for Anxiety or Sleep. (Patient taking differently: Take 1 mg by mouth 2 (Two) Times a Day As Needed for Anxiety or Sleep.)   • Cholecalciferol (Vitamin D3) 25 MCG (1000 UT) capsule Take 1,000 Units by mouth Daily.   • Continuous Blood Gluc Sensor " (FreeStyle Roseline 14 Day Sensor) misc Use as directed.   • Continuous Blood Gluc Sensor (FreeStyle Roseline Sensor System) 1 Device Every 14 (Fourteen) Days.   • Docusate Sodium (COLACE PO) Take 2 capsules by mouth every night at bedtime.   • esomeprazole (nexIUM) 40 MG capsule Take 1 capsule by mouth Every Morning Before Breakfast.   • ferrous sulfate 325 (65 FE) MG tablet TAKE ONE TABLET BY MOUTH TWICE A DAY (Patient taking differently: Take 325 mg by mouth 2 (two) times a day.)   • FLUoxetine (PROzac) 10 MG capsule Take 20 mg by mouth Daily.   • fluticasone (FLONASE) 50 MCG/ACT nasal spray 2 sprays into the nostril(s) as directed by provider Daily.   • glucose blood test strip by Other route As Needed.   • hydrOXYzine (ATARAX) 25 MG tablet Take 2 tablets by mouth Every Night.   • insulin detemir (Levemir FlexTouch) 100 UNIT/ML injection Inject 80 Units under the skin into the appropriate area as directed 2 (Two) Times a Day. (Patient taking differently: Inject 60 Units under the skin into the appropriate area as directed 2 (Two) Times a Day.)   • Insulin Pen Needle (True Comfort Pro Pen Needles) 32G X 4 MM misc 1 each 2 (Two) Times a Day.   • levETIRAcetam (KEPPRA) 500 MG tablet TAKE ONE TABLET BY MOUTH TWICE A DAY   • Magnesium Oxide 400 MG capsule Take 400 mg by mouth Every Night.   • metaxalone (Skelaxin) 800 MG tablet Every 8 (Eight) Hours.   • metoclopramide (REGLAN) 10 MG tablet Take 1 tablet by mouth 4 (Four) Times a Day Before Meals & at Bedtime.   • mirtazapine (REMERON) 15 MG tablet Take 30 mg by mouth Every Night.   • Multiple Vitamins-Minerals (MULTIVITAMIN WITH MINERALS) tablet tablet Take 1 tablet by mouth Daily.   • NovoLOG 100 UNIT/ML injection Inject 60 units under the skin  3 times daily  With meals   • polyethylene glycol (MIRALAX) powder Take 17 g by mouth Daily As Needed.   • Prasterone (Intrarosa) 6.5 MG insert Insert 1 each into the vagina 2 (Two) Times a Week.   • pregabalin (LYRICA) 75 MG  capsule Take 75 mg by mouth 2 (Two) Times a Day.   • promethazine (PHENERGAN) 25 MG tablet Take 25 mg by mouth Every 8 (Eight) Hours As Needed for Nausea or Vomiting.   • riFAXIMin (Xifaxan) 550 MG tablet Take 1 tablet by mouth 2 (Two) Times a Day.   • sertraline (ZOLOFT) 50 MG tablet Take 50 mg by mouth Daily.   • spironolactone (ALDACTONE) 100 MG tablet TAKE ONE TABLET BY MOUTH DAILY (Patient taking differently: Take 100 mg by mouth Daily As Needed (swelling).)   • vitamin B-12 (CYANOCOBALAMIN) 1000 MCG tablet Take 1,000 mcg by mouth Daily.   • zinc gluconate 50 MG tablet Take 50 mg by mouth Daily.   • zolpidem (AMBIEN) 5 MG tablet 5 mg Every Night.   • [DISCONTINUED] cholestyramine light 4 g packet Take 4 g by mouth 2 (Two) Times a Day.   • [DISCONTINUED] ergocalciferol (ERGOCALCIFEROL) 1.25 MG (01520 UT) capsule Vitamin D2 1,250 mcg (50,000 unit) capsule   • [DISCONTINUED] Zolpidem Tartrate (AMBIEN PO) Ambien   • cholestyramine light 4 g powder Take 1 packet by mouth 2 (Two) Times a Day. (Patient taking differently: Take 8 g by mouth 2 (Two) Times a Day.)   • insulin lispro (humaLOG) 100 UNIT/ML injection Inject 70 Units under the skin into the appropriate area as directed 3 (Three) Times a Day.   • [DISCONTINUED] ALPRAZolam (XANAX) 1 MG tablet    • [DISCONTINUED] furosemide (LASIX) 80 MG tablet Take 80 mg by mouth Daily As Needed (swelling).   • [DISCONTINUED] HYDROcodone-acetaminophen (NORCO) 5-325 MG per tablet Take 1 tablet by mouth.   • [DISCONTINUED] lubiprostone (Amitiza) 8 MCG capsule Take 1 capsule by mouth 2 (Two) Times a Day With Meals.   • [DISCONTINUED] OxyCODONE HCl (OXAYDO) 7.5 MG tablet  Take 7.5 mg by mouth Every 8 (Eight) Hours As Needed.   • [DISCONTINUED] oxyCODONE-acetaminophen (PERCOCET) 7.5-325 MG per tablet Take 1 tablet by mouth.   • [DISCONTINUED] sucralfate (CARAFATE) 1 GM/10ML suspension Take 1 g by mouth 3 (Three) Times a Day.     No current facility-administered medications on file  prior to visit.         HISTORY OF PRESENT ILLNESS     Updates regarding patient specialists:     Hematology and Oncology: Dr. Mcdowell manages pancytopenia. Patient often receives blood, iron, and platelet infusions as need. On daily iron supplementation.      Rheumatology: Dr. Munoz manages fibromyalgia, rheumatoid arthritis, and systemic lupus. Last seen by physician assistant august 2021. On daily lyrica.     Pain Management: followed by [facility] for lumbar degenerative disc disease. No treatment. Oxycodone caused vomiting and itching. Working on prior authorization for oxaydo. Liver allowing 1500mg tylenol daily temporary until prior auth complete.     Muscle cramps: occurring daily. Skelaxin TID and magnesium supplement. Controlled with this regimen.      Hyperlipidemia/dyslipidemia: last checked May 2019, elevated triglycerides. Treatment with  for both treatment of HLD and itching with DUKES.      Petit mal seizure: started on keppra 500mg daily by previous family physician. NO seizure since.      Gynecology: Followed by Dr. Dallas removal of bartholin cyst. Atrophic vaginitis treated with prasterone  Vaginal insert. Manages DEXA, mammogram, and pelvic exam if needed (hysterectomy in 1986).      Endocrinology: manages type II diabetes. Current treatment with lantus 80 untis BID, and sliding scale humalog. Patient monitor blood sugar with freestyle jesusita sensor. Unfortunately, she missed appointment and has been out of humalog for almost a week. Patient stated endo office refused refill until office visit. She has since been taking 120 BID of lantus. Blood sugars were around 250-80 with sliding scale and basal, however, have reached in the 350s-400s in the last week without rapid insulin.  Patient also has diabetic neuropathy which is controlled with lyrica used for fibromyalgia.      Anxiety and Depression: Seeing psychiatry, Dr. Almaguer.Xanax 0.5mg twice daily as needed for anxiety/insomnia,  along with daily remeron.     Gastroenterology: DUKES and gastroparesis, consistent nausea and intermittent vomitting. Patient stated mother was also diagnosed. Lived in [place] over toxic waste according to patient. Followed closely by Dr. Gray.  Lasix and spironolactone were prescribed by gastro for swelling as needed. Hydroxyzine for itching per gastro, along with cholest packet. Also is given daily nexium and reglan regimen. She is currently also taking carafate. Xifaxin for bowel movements as she is unable to take lactulose. Colace every day for BM and miralax as needed. She is currently in need of a transplant with Dr. Montes on board at [facility].     Ophthalmology: 6 month recheck recommended, seen July 26.     Today, patient's main complaint is new onset of headache, fatigue, bilateral ear fullness, chest tightness, postnasal drip and sinus pressure and pain over the course the last month.  September 5 she was tested negative for both Covid and strep.  She is a recent transplant to [place] in November 2020.  She does admit to some watery itchy eyes and sneezing.  She was given Flonase at urgent care which has helped her symptoms mildly.  Denies any productive cough.       REVIEW OF SYSTEMS     Constitutional neg except per HPI   ENT nasal congestion, postnasal drip, ear fullness  Resp chest tightness  CV neg  Neuro headache     PHYSICAL EXAMINATION     Physical Exam  Constitutional  No distress  ENT sinus tenderness with palpation, nasal congestion, postnasal drip, bilateral ear effusion without infection  Cardiovascular Rate  normal . Rhythm: regular . Heart sounds:  normal  Pulmonary/Chest  Effort normal. Breath sounds:  normal  Psychiatric  Alert. Judgment and thought content normal. Mood normal     REVIEWED DATA     Labs:         Imaging:           Medical Tests:             Summary of old records / correspondence / consultant report:           Request outside records:            *Examiner was wearing medical surgical mask, face shield and exam gloves during the entire duration of the visit. Patient was masked the entire time.   Minimum social distance of 6 ft maintained entire visit except if physical contact was necessary as documented.     **Dragon Disclaimer:   Much of this encounter note is an electronic transcription/translation of spoken language to printed text. The electronic translation of spoken language may permit erroneous, or at times, nonsensical words or phrases to be inadvertently transcribed. Although I have reviewed the note for such errors, some may still exist.

## 2021-09-23 RX ORDER — ESOMEPRAZOLE MAGNESIUM 40 MG/1
CAPSULE, DELAYED RELEASE ORAL
Qty: 90 CAPSULE | Refills: 1 | Status: SHIPPED | OUTPATIENT
Start: 2021-09-23 | End: 2022-02-08

## 2021-10-01 PROBLEM — T45.4X5S ADVERSE EFFECT OF IRON AND ITS COMPOUNDS, SEQUELA: Status: ACTIVE | Noted: 2019-01-22

## 2021-10-04 ENCOUNTER — HOSPITAL ENCOUNTER (EMERGENCY)
Facility: HOSPITAL | Age: 61
Discharge: HOME | End: 2021-10-04
Attending: EMERGENCY MEDICINE | Admitting: EMERGENCY MEDICINE

## 2021-10-04 ENCOUNTER — LAB (OUTPATIENT)
Dept: LAB | Facility: HOSPITAL | Age: 61
End: 2021-10-04

## 2021-10-04 ENCOUNTER — TELEPHONE (OUTPATIENT)
Dept: ONCOLOGY | Facility: CLINIC | Age: 61
End: 2021-10-04

## 2021-10-04 VITALS
TEMPERATURE: 98 F | HEART RATE: 97 BPM | SYSTOLIC BLOOD PRESSURE: 150 MMHG | RESPIRATION RATE: 18 BRPM | HEIGHT: 67 IN | OXYGEN SATURATION: 98 % | BODY MASS INDEX: 30.13 KG/M2 | WEIGHT: 192 LBS | DIASTOLIC BLOOD PRESSURE: 80 MMHG

## 2021-10-04 DIAGNOSIS — I85.11 SECONDARY ESOPHAGEAL VARICES WITH BLEEDING (HCC): ICD-10-CM

## 2021-10-04 DIAGNOSIS — K74.60 LIVER CIRRHOSIS SECONDARY TO NASH (HCC): ICD-10-CM

## 2021-10-04 DIAGNOSIS — D69.3 ACUTE ITP (HCC): ICD-10-CM

## 2021-10-04 DIAGNOSIS — K75.81 NASH (NONALCOHOLIC STEATOHEPATITIS): ICD-10-CM

## 2021-10-04 DIAGNOSIS — K75.81 LIVER CIRRHOSIS SECONDARY TO NASH (HCC): ICD-10-CM

## 2021-10-04 DIAGNOSIS — D50.9 IRON DEFICIENCY ANEMIA, UNSPECIFIED IRON DEFICIENCY ANEMIA TYPE: ICD-10-CM

## 2021-10-04 DIAGNOSIS — Z79.4 TYPE 2 DIABETES MELLITUS WITH HYPERGLYCEMIA, WITH LONG-TERM CURRENT USE OF INSULIN (HCC): Primary | ICD-10-CM

## 2021-10-04 DIAGNOSIS — D69.6 THROMBOCYTOPENIA (HCC): ICD-10-CM

## 2021-10-04 DIAGNOSIS — E11.65 TYPE 2 DIABETES MELLITUS WITH HYPERGLYCEMIA, WITH LONG-TERM CURRENT USE OF INSULIN (HCC): Primary | ICD-10-CM

## 2021-10-04 DIAGNOSIS — K74.69 OTHER CIRRHOSIS OF LIVER (HCC): ICD-10-CM

## 2021-10-04 DIAGNOSIS — D61.818 PANCYTOPENIA (HCC): ICD-10-CM

## 2021-10-04 DIAGNOSIS — K31.84 GASTROPARESIS: ICD-10-CM

## 2021-10-04 LAB
ALBUMIN SERPL-MCNC: 3.7 G/DL (ref 3.5–5.2)
ALBUMIN SERPL-MCNC: 4.2 G/DL (ref 3.5–5.2)
ALBUMIN/GLOB SERPL: 1.3 G/DL (ref 1.1–2.4)
ALBUMIN/GLOB SERPL: 1.4 G/DL
ALP SERPL-CCNC: 161 U/L (ref 39–117)
ALP SERPL-CCNC: 169 U/L (ref 38–116)
ALT SERPL W P-5'-P-CCNC: 24 U/L (ref 0–33)
ALT SERPL W P-5'-P-CCNC: 32 U/L (ref 1–33)
ANION GAP SERPL CALCULATED.3IONS-SCNC: 11.6 MMOL/L (ref 5–15)
ANION GAP SERPL CALCULATED.3IONS-SCNC: 14.4 MMOL/L (ref 5–15)
AST SERPL-CCNC: 37 U/L (ref 1–32)
AST SERPL-CCNC: 38 U/L (ref 0–32)
B-OH-BUTYR SERPL-SCNC: 0.13 MMOL/L (ref 0.02–0.27)
BACTERIA UR QL AUTO: ABNORMAL /HPF
BASOPHILS # BLD AUTO: 0.03 10*3/MM3 (ref 0–0.2)
BASOPHILS # BLD AUTO: 0.03 10*3/MM3 (ref 0–0.2)
BASOPHILS NFR BLD AUTO: 0.6 % (ref 0–1.5)
BASOPHILS NFR BLD AUTO: 1 % (ref 0–1.5)
BILIRUB SERPL-MCNC: 1.5 MG/DL (ref 0.2–1.2)
BILIRUB SERPL-MCNC: 1.5 MG/DL (ref 0–1.2)
BILIRUB UR QL STRIP: NEGATIVE
BUN SERPL-MCNC: 23 MG/DL (ref 6–20)
BUN SERPL-MCNC: 23 MG/DL (ref 6–20)
BUN/CREAT SERPL: 21.7 (ref 7–25)
BUN/CREAT SERPL: 24.2 (ref 7.3–30)
CALCIUM SPEC-SCNC: 9.1 MG/DL (ref 8.5–10.2)
CALCIUM SPEC-SCNC: 9.5 MG/DL (ref 8.6–10.5)
CHLORIDE SERPL-SCNC: 94 MMOL/L (ref 98–107)
CHLORIDE SERPL-SCNC: 98 MMOL/L (ref 98–107)
CLARITY UR: CLEAR
CO2 SERPL-SCNC: 21.4 MMOL/L (ref 22–29)
CO2 SERPL-SCNC: 22.6 MMOL/L (ref 22–29)
COLOR UR: YELLOW
CREAT SERPL-MCNC: 0.95 MG/DL (ref 0.6–1.1)
CREAT SERPL-MCNC: 1.06 MG/DL (ref 0.57–1)
DEPRECATED RDW RBC AUTO: 47.3 FL (ref 37–54)
DEPRECATED RDW RBC AUTO: 50.6 FL (ref 37–54)
EOSINOPHIL # BLD AUTO: 0.06 10*3/MM3 (ref 0–0.4)
EOSINOPHIL # BLD AUTO: 0.13 10*3/MM3 (ref 0–0.4)
EOSINOPHIL NFR BLD AUTO: 1.9 % (ref 0.3–6.2)
EOSINOPHIL NFR BLD AUTO: 2.4 % (ref 0.3–6.2)
ERYTHROCYTE [DISTWIDTH] IN BLOOD BY AUTOMATED COUNT: 14.6 % (ref 12.3–15.4)
ERYTHROCYTE [DISTWIDTH] IN BLOOD BY AUTOMATED COUNT: 15.2 % (ref 12.3–15.4)
FERRITIN SERPL-MCNC: 118 NG/ML (ref 11–207)
GFR SERPL CREATININE-BSD FRML MDRD: 53 ML/MIN/1.73
GFR SERPL CREATININE-BSD FRML MDRD: 60 ML/MIN/1.73
GLOBULIN UR ELPH-MCNC: 2.8 GM/DL (ref 1.8–3.5)
GLOBULIN UR ELPH-MCNC: 2.9 GM/DL
GLUCOSE BLDC GLUCOMTR-MCNC: 288 MG/DL (ref 70–130)
GLUCOSE BLDC GLUCOMTR-MCNC: 448 MG/DL (ref 70–130)
GLUCOSE SERPL-MCNC: 391 MG/DL (ref 65–99)
GLUCOSE SERPL-MCNC: 725 MG/DL (ref 74–124)
GLUCOSE UR STRIP-MCNC: ABNORMAL MG/DL
HCT VFR BLD AUTO: 34.8 % (ref 34–46.6)
HCT VFR BLD AUTO: 38.2 % (ref 34–46.6)
HGB BLD-MCNC: 12 G/DL (ref 12–15.9)
HGB BLD-MCNC: 13.3 G/DL (ref 12–15.9)
HGB UR QL STRIP.AUTO: ABNORMAL
HOLD SPECIMEN: NORMAL
HOLD SPECIMEN: NORMAL
HYALINE CASTS UR QL AUTO: ABNORMAL /LPF
IMM GRANULOCYTES # BLD AUTO: 0.02 10*3/MM3 (ref 0–0.05)
IMM GRANULOCYTES # BLD AUTO: 0.04 10*3/MM3 (ref 0–0.05)
IMM GRANULOCYTES NFR BLD AUTO: 0.6 % (ref 0–0.5)
IMM GRANULOCYTES NFR BLD AUTO: 0.7 % (ref 0–0.5)
IRON 24H UR-MRATE: 99 MCG/DL (ref 37–145)
IRON SATN MFR SERPL: 23 % (ref 14–48)
KETONES UR QL STRIP: NEGATIVE
LEUKOCYTE ESTERASE UR QL STRIP.AUTO: NEGATIVE
LYMPHOCYTES # BLD AUTO: 0.52 10*3/MM3 (ref 0.7–3.1)
LYMPHOCYTES # BLD AUTO: 0.93 10*3/MM3 (ref 0.7–3.1)
LYMPHOCYTES NFR BLD AUTO: 16.6 % (ref 19.6–45.3)
LYMPHOCYTES NFR BLD AUTO: 17.4 % (ref 19.6–45.3)
MCH RBC QN AUTO: 30.7 PG (ref 26.6–33)
MCH RBC QN AUTO: 31.6 PG (ref 26.6–33)
MCHC RBC AUTO-ENTMCNC: 34.5 G/DL (ref 31.5–35.7)
MCHC RBC AUTO-ENTMCNC: 34.8 G/DL (ref 31.5–35.7)
MCV RBC AUTO: 89 FL (ref 79–97)
MCV RBC AUTO: 90.7 FL (ref 79–97)
MONOCYTES # BLD AUTO: 0.27 10*3/MM3 (ref 0.1–0.9)
MONOCYTES # BLD AUTO: 0.52 10*3/MM3 (ref 0.1–0.9)
MONOCYTES NFR BLD AUTO: 8.6 % (ref 5–12)
MONOCYTES NFR BLD AUTO: 9.7 % (ref 5–12)
NEUTROPHILS NFR BLD AUTO: 2.23 10*3/MM3 (ref 1.7–7)
NEUTROPHILS NFR BLD AUTO: 3.7 10*3/MM3 (ref 1.7–7)
NEUTROPHILS NFR BLD AUTO: 69.2 % (ref 42.7–76)
NEUTROPHILS NFR BLD AUTO: 71.3 % (ref 42.7–76)
NITRITE UR QL STRIP: NEGATIVE
NRBC BLD AUTO-RTO: 0 /100 WBC (ref 0–0.2)
NRBC BLD AUTO-RTO: 0 /100 WBC (ref 0–0.2)
PH UR STRIP.AUTO: 5.5 [PH] (ref 5–8)
PLATELET # BLD AUTO: 76 10*3/MM3 (ref 140–450)
PLATELET # BLD AUTO: 96 10*3/MM3 (ref 140–450)
PMV BLD AUTO: 10 FL (ref 6–12)
PMV BLD AUTO: 10.2 FL (ref 6–12)
POTASSIUM SERPL-SCNC: 3.9 MMOL/L (ref 3.5–5.2)
POTASSIUM SERPL-SCNC: 5.3 MMOL/L (ref 3.5–4.7)
PROT SERPL-MCNC: 6.5 G/DL (ref 6.3–8)
PROT SERPL-MCNC: 7.1 G/DL (ref 6–8.5)
PROT UR QL STRIP: ABNORMAL
RBC # BLD AUTO: 3.91 10*6/MM3 (ref 3.77–5.28)
RBC # BLD AUTO: 4.21 10*6/MM3 (ref 3.77–5.28)
RBC # UR: ABNORMAL /HPF
REF LAB TEST METHOD: ABNORMAL
SODIUM SERPL-SCNC: 127 MMOL/L (ref 134–145)
SODIUM SERPL-SCNC: 135 MMOL/L (ref 136–145)
SP GR UR STRIP: >=1.03 (ref 1–1.03)
SQUAMOUS #/AREA URNS HPF: ABNORMAL /HPF
TIBC SERPL-MCNC: 426 MCG/DL (ref 249–505)
TRANSFERRIN SERPL-MCNC: 304 MG/DL (ref 200–360)
UROBILINOGEN UR QL STRIP: ABNORMAL
VIT B12 BLD-MCNC: >2000 PG/ML (ref 211–946)
WBC # BLD AUTO: 3.13 10*3/MM3 (ref 3.4–10.8)
WBC # BLD AUTO: 5.35 10*3/MM3 (ref 3.4–10.8)
WBC CLUMPS # UR AUTO: ABNORMAL /HPF
WBC UR QL AUTO: ABNORMAL /HPF
WHOLE BLOOD HOLD SPECIMEN: NORMAL
WHOLE BLOOD HOLD SPECIMEN: NORMAL
YEAST URNS QL MICRO: ABNORMAL /HPF

## 2021-10-04 PROCEDURE — 80053 COMPREHEN METABOLIC PANEL: CPT

## 2021-10-04 PROCEDURE — 84466 ASSAY OF TRANSFERRIN: CPT

## 2021-10-04 PROCEDURE — 85025 COMPLETE CBC W/AUTO DIFF WBC: CPT

## 2021-10-04 PROCEDURE — 82962 GLUCOSE BLOOD TEST: CPT

## 2021-10-04 PROCEDURE — 36415 COLL VENOUS BLD VENIPUNCTURE: CPT

## 2021-10-04 PROCEDURE — 82607 VITAMIN B-12: CPT | Performed by: INTERNAL MEDICINE

## 2021-10-04 PROCEDURE — 83540 ASSAY OF IRON: CPT

## 2021-10-04 PROCEDURE — 81001 URINALYSIS AUTO W/SCOPE: CPT

## 2021-10-04 PROCEDURE — 82010 KETONE BODYS QUAN: CPT

## 2021-10-04 PROCEDURE — 82728 ASSAY OF FERRITIN: CPT

## 2021-10-04 PROCEDURE — 99283 EMERGENCY DEPT VISIT LOW MDM: CPT

## 2021-10-04 RX ORDER — SODIUM CHLORIDE 0.9 % (FLUSH) 0.9 %
10 SYRINGE (ML) INJECTION AS NEEDED
Status: DISCONTINUED | OUTPATIENT
Start: 2021-10-04 | End: 2021-10-05 | Stop reason: HOSPADM

## 2021-10-04 NOTE — TELEPHONE ENCOUNTER
----- Message from Sukhdeep Mcdowell MD sent at 10/4/2021  4:12 PM EDT -----  Please let the patient know that her blood sugar was over 700 on her lab today.  We shared the results with her endocrinologist who advised it may be best for her to be seen in the ER to get her blood sugar under better control.Thanks

## 2021-10-04 NOTE — TELEPHONE ENCOUNTER
"Reviewed Dr. Mcdowell's note and instructions with patient. RN stressed it was important to be seen at the ER as soon as possible, because her sugar was at a dangerous level that wasn't able to be safely treated at home. Pt v/u, but pt said \"i'm at my liver doctor right now, then i'll go home and then I'll go to the ER.\" RN reviewed importance again and told pt to call us with any changes. Pt v/u  "

## 2021-10-05 NOTE — ED NOTES
This RN in appropriate ppe while in pt room. Pt wearing mask.        Claudia Marti, RN  10/04/21 [id number]

## 2021-10-05 NOTE — ED TRIAGE NOTES
"Pt ambulatory to triage. Pt reports hyperglycemia. Pt is a Diabetic. Pt reports it reads \"high\" at home. Pt says she checked for ketones at home and was negative. Pt has been compliant with insulin.     Pt given mask in triage, staff in PPE.      "

## 2021-10-05 NOTE — ED PROVIDER NOTES
" EMERGENCY DEPARTMENT ENCOUNTER    Room Number:  19/19  Date of encounter:  10/4/2021  PCP: Kulwant Martínez APRN  Historian: Patient      HPI:  Chief Complaint: Hyperglycemia  A complete HPI/ROS/PMH/PSH/SH/FH are unobtainable due to: None    Context: Silvia Zabala is a 61 y.o. female who presents to the ED c/o hyperglycemia.  She states that for the past week her blood sugar has been running high.  In fact, her glucometer was reading \"high\" earlier in the day.  She self corrects her sugar with sliding scale insulin per her endocrinologist.  Overall, her blood sugar has improved.  However, her endocrinologist office instructed her to come to the emergency department for evaluation today.    She denies having any fever, cough, chest pain, shortness of breath, vomiting, abdominal pain, diarrhea.  No urinary symptoms.      PAST MEDICAL HISTORY  Active Ambulatory Problems     Diagnosis Date Noted   • Cirrhosis of liver (HCC) 03/08/2016   • Rheumatoid arthritis (HCC) 03/08/2016   • Anxiety and depression 03/08/2016   • Type 2 diabetes mellitus, uncontrolled, with neuropathy (HCC) 03/15/2016   • Fibrositis 03/15/2016   • Change in blood platelet count 03/15/2016   • Pancytopenia (HCC) 04/12/2016   • Hypersplenism 04/12/2016   • Nausea & vomiting 06/14/2016   • DUKES (nonalcoholic steatohepatitis) 06/14/2016   • Hyperlipidemia    • Abdominal pain 02/14/2017   • Hematemesis 02/15/2017   • Ascites 02/21/2017   • Portal hypertension (HCC) 02/22/2017   • Secondary esophageal varices without bleeding (HCC) 02/22/2017   • Esophageal varices with bleeding (HCC) 03/07/2017   • Gastroparesis 10/17/2017   • 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 (HCC) 11/17/2017   • Diabetic ketoacidosis without coma associated with type 2 diabetes mellitus (HCC) 12/16/2017   • Diabetic peripheral neuropathy (HCC) 12/17/2017   • History of seizure disorder 12/17/2017   • Hepatic encephalopathy (HCC) 05/08/2018   • Thrombocytopenia (HCC) 05/16/2018   • " Fever 05/17/2018   • Acute ITP (HCC) 05/18/2018   • Degeneration of lumbosacral intervertebral disc 05/30/2018   • Spondylosis without myelopathy 05/30/2018   • UGI bleed 10/04/2018   • Migraine without aura 10/11/2018   • Urinary retention 10/11/2018   • Uncontrolled diabetes mellitus with hyperglycemia (HCC) 12/13/2018   • BRICE (obstructive sleep apnea) 12/13/2018   • Gastroparesis 12/13/2018   • Hyperammonemia (HCC) 12/13/2018   • Hyponatremia 12/13/2018   • Anemia 12/13/2018   • Vitamin D insufficiency 12/16/2018   • Hyperglycemia 01/17/2019   • Iron deficiency anemia 01/22/2019   • Adverse effect of iron and its compounds, sequela 01/22/2019   • Hemorrhage of anus and rectum 02/15/2019   • Vulvar lesion 05/09/2019   • Acute upper GI bleed 05/09/2019   • Acute blood loss anemia 05/14/2019   • Acute hyperkalemia 05/14/2019   • Labial cyst 01/18/2021   • Transaminitis 02/03/2021   • Hyperbilirubinemia 02/03/2021   • Acute gastritis 02/03/2021   • UTI (urinary tract infection), bacterial 02/03/2021   • UTI (urinary tract infection) 02/03/2021   • Cholestatic pruritus 05/20/2020   • Fatty liver disease, nonalcoholic 05/20/2020   • Protein-calorie malnutrition, moderate (HCC) 05/20/2020   • Hyperglycemia due to type 1 diabetes mellitus (HCC) 04/04/2021   • Ascites 04/05/2021   • Hospital discharge follow-up 04/12/2021     Resolved Ambulatory Problems     Diagnosis Date Noted   • Systemic lupus (HCC) 02/22/2017   • Upper GI bleed 04/25/2017   • Abnormal finding of blood chemistry  05/08/2018   • Seizure (HCC) 05/30/2018   • Intractable vomiting with nausea 10/03/2018   • Dehydration 01/17/2019   • Elevated procalcitonin 04/02/2021   • Intractable vomiting with nausea 04/29/2021     Past Medical History:   Diagnosis Date   • Anxiety    • Arthritis    • Broken shoulder    • Chromosomal abnormality    • Cirrhosis (CMS/HCC)    • Clostridium difficile infection    • Colon polyps    • Depression    • Diabetes mellitus  (CMS/HCC)    • Duodenal ulcer with hemorrhage    • Esophageal varices determined by endoscopy (CMS/HCC)    • Fibromyalgia    • Gallbladder disease    • Gastric varices    • Glaucoma    • Granuloma annulare    • H/O B12 deficiency    • H/O Bleeding ulcer    • H/O mixed connective tissue disorder    • Hemorrhoids    • Hiatal hernia    • History of transfusion    • Hx of being hospitalized    • Migraine    • Mitral valve prolapse    • Orthostatic hypotension 02/2019   • Pancreas divisum    • Peptic ulcer disease    • PONV (postoperative nausea and vomiting)    • RA (rheumatoid arthritis) (CMS/HCC)    • Seizure disorder (CMS/HCC)    • Seizures (CMS/HCC)    • TIA (transient ischemic attack) 1984         PAST SURGICAL HISTORY  Past Surgical History:   Procedure Laterality Date   • BARTHOLIN CYST MARSUPIALIZATION Left 1/18/2021    Procedure: EXCISION OF LABIAL CYST;  Surgeon: Melinda Thakkar MD;  Location: [facility] MAIN OR;  Service: Obstetrics/Gynecology;  Laterality: Left;   • BLADDER REPAIR  1991   • CATARACT EXTRACTION     • CHOLECYSTECTOMY  1994   • COLONOSCOPY     • ENDOSCOPY N/A 11/7/2016    Procedure: ESOPHAGOGASTRODUODENOSCOPY WITH COLD POLYPECTOMY, BANDING,  AND CLIPS TIMES 2;  Surgeon: Rich Coleman MD;  Location: [facility] ENDOSCOPY;  Service:    • ENDOSCOPY N/A 12/22/2016    Procedure: ESOPHAGOGASTRODUODENOSCOPY WITH ESOPHAGEAL BANDING. 5 BANDS FIRED, 4 BANDS ADHERERD TO MUCOSA;  Surgeon: Rich Coleman MD;  Location: [facility] ENDOSCOPY;  Service:    • ENDOSCOPY N/A 2/15/2017    Procedure: ESOPHAGOGASTRODUODENOSCOPY AT BEDSIDE with esophageal varices banding (3);  Surgeon: Rich Coleman MD;  Location: [facility] ENDOSCOPY;  Service:    • ENDOSCOPY N/A 4/6/2017    Procedure: ESOPHAGOGASTRODUODENOSCOPY WITH ESOPHAGEAL VARICES BANDING x2;  Surgeon: Rich Coleman MD;  Location: [facility] ENDOSCOPY;  Service:    • ENDOSCOPY N/A 11/24/2017    Procedure: ESOPHAGOGASTRODUODENOSCOPY with biopsies;  Surgeon: Rich GONZALEZ  "MD [place];  Location: [facility] ENDOSCOPY;  Service:    • ENDOSCOPY N/A 10/5/2018    Procedure: ESOPHAGOGASTRODUODENOSCOPY;  Surgeon: Rich Coleman MD;  [facility];  Service: Gastroenterology   • ENDOSCOPY N/A 5/10/2019    Procedure: ESOPHAGOGASTRODUODENOSCOPY AT BEDSIDE WITH VARICEAL LIGATION;  Surgeon: Rich Coleman MD;  [facility];  Service: Gastroenterology   • ENDOSCOPY N/A 6/28/2019    Procedure: ESOPHAGOGASTRODUODENOSCOPY WITH ESOPHAGEAL BANDING (3 BANDS);  Surgeon: Rich Coleman MD;  [facility];  Service: Gastroenterology   • ENDOSCOPY N/A 4/3/2021    Procedure: ESOPHAGOGASTRODUODENOSCOPY WITH COLD BIOPSIES & BRUSHINGS;  Surgeon: Endy Vasquez MD;  Location: [facility] ENDOSCOPY;  Service: Gastroenterology;  Laterality: N/A;  PRE- N,V & DYSPHAGIA  POST- GASTRITIS, POSSIBLE FUNGAL ESOPHAGITIS   • ENDOSCOPY AND COLONOSCOPY  2014    Dr. Rich Coleman with findings of candida esophagitis   • EYE SURGERY     • HYSTERECTOMY  1986    partial   • JOINT REPLACEMENT     • KNEE SURGERY Right 1995    \"right knee recontstruction\"   • LIVER BIOPSY  01/2015   • MASS EXCISION     • STOMACH SURGERY     • TIPS PROCEDURE  2020    x2         FAMILY HISTORY  Family History   Problem Relation Age of Onset   • Liver disease Other    • Depression Other    • Heart disease Other    • Hypertension Other    • Diabetes Other    • Breast cancer Other    • Brain cancer Other    • Uterine cancer Other    • Colon cancer Other    • Leukemia Other    • Colon cancer Maternal Aunt    • Hypertension Mother    • Diabetes type II Mother    • Rheum arthritis Mother    • Liver disease Mother         DUKES   • Heart disease Father    • Diabetes type II Father    • Diabetes type II Sister    • Lupus Sister    • Malig Hyperthermia Neg Hx          SOCIAL HISTORY  Social History     Socioeconomic History   • Marital status:      Spouse name: Jose   • Number of children: Not on file   • Years " of education: Not on file   • Highest education level: Not on file   Tobacco Use   • Smoking status: Former Smoker     Packs/day: 1.00     Years: 4.00     Pack years: 4.00     Types: Cigarettes     Quit date: 2015     Years since quittin.8   • Smokeless tobacco: Never Used   Vaping Use   • Vaping Use: Never used   Substance and Sexual Activity   • Alcohol use: No   • Drug use: Never   • Sexual activity: Defer         ALLERGIES  Albuterol, Imitrex [sumatriptan], Tramadol, Coconut (cocos nucifera) allergy skin test, Nsaids, Tylenol [acetaminophen], Zofran [ondansetron hcl], Codeine, Lactulose, and Quinine derivatives        REVIEW OF SYSTEMS  Review of Systems     All systems reviewed and negative except for those discussed in HPI.       PHYSICAL EXAM    I have reviewed the triage vital signs and nursing notes.    ED Triage Vitals   Temp Heart Rate Resp BP SpO2   10/04/21 2107 10/04/21 2107 10/04/21 2107 10/04/21 2110 10/04/21 2107   98 °F (36.7 °C) 102 18 170/88 97 %      Temp src Heart Rate Source Patient Position BP Location FiO2 (%)   10/04/21 2107 -- 10/04/21 2110 10/04/21 2110 --   Tympanic  Standing Left arm        Physical Exam  GENERAL: not distressed  HENT: nares patent  EYES: no scleral icterus  CV: regular rhythm, regular rate  RESPIRATORY: normal effort, clear to auscultation bilaterally  ABDOMEN: soft, nontender  MUSCULOSKELETAL: no deformity  NEURO: alert, moves all extremities, follows commands  SKIN: warm, dry        LAB RESULTS  Recent Results (from the past 24 hour(s))   Comprehensive Metabolic Panel    Collection Time: 10/04/21 12:20 PM    Specimen: Blood   Result Value Ref Range    Glucose 725 (C) 74 - 124 mg/dL    BUN 23 (H) 6 - 20 mg/dL    Creatinine 0.95 0.60 - 1.10 mg/dL    Sodium 127 (C) 134 - 145 mmol/L    Potassium 5.3 (H) 3.5 - 4.7 mmol/L    Chloride 94 (L) 98 - 107 mmol/L    CO2 21.4 (L) 22.0 - 29.0 mmol/L    Calcium 9.1 8.5 - 10.2 mg/dL    Total Protein 6.5 6.3 - 8.0 g/dL     Albumin 3.70 3.50 - 5.20 g/dL    ALT (SGPT) 24 0 - 33 U/L    AST (SGOT) 38 (H) 0 - 32 U/L    Alkaline Phosphatase 169 (H) 38 - 116 U/L    Total Bilirubin 1.5 (H) 0.2 - 1.2 mg/dL    eGFR Non African Amer 60 (L) >60 mL/min/1.73    Globulin 2.8 1.8 - 3.5 gm/dL    A/G Ratio 1.3 1.1 - 2.4 g/dL    BUN/Creatinine Ratio 24.2 7.3 - 30.0    Anion Gap 11.6 5.0 - 15.0 mmol/L   Ferritin    Collection Time: 10/04/21 12:20 PM    Specimen: Blood   Result Value Ref Range    Ferritin 118.00 11.00 - 207.00 ng/mL   Iron Profile    Collection Time: 10/04/21 12:20 PM    Specimen: Blood   Result Value Ref Range    Iron 99 37 - 145 mcg/dL    Iron Saturation 23 14 - 48 %    Transferrin 304 200 - 360 mg/dL    TIBC 426 249 - 505 mcg/dL   Vitamin B12    Collection Time: 10/04/21 12:20 PM    Specimen: Blood   Result Value Ref Range    Vitamin B-12 >2,000 (H) 211 - 946 pg/mL   CBC Auto Differential    Collection Time: 10/04/21 12:20 PM    Specimen: Blood   Result Value Ref Range    WBC 3.13 (L) 3.40 - 10.80 10*3/mm3    RBC 3.91 3.77 - 5.28 10*6/mm3    Hemoglobin 12.0 12.0 - 15.9 g/dL    Hematocrit 34.8 34.0 - 46.6 %    MCV 89.0 79.0 - 97.0 fL    MCH 30.7 26.6 - 33.0 pg    MCHC 34.5 31.5 - 35.7 g/dL    RDW 14.6 12.3 - 15.4 %    RDW-SD 47.3 37.0 - 54.0 fl    MPV 10.2 6.0 - 12.0 fL    Platelets 76 (L) 140 - 450 10*3/mm3    Neutrophil % 71.3 42.7 - 76.0 %    Lymphocyte % 16.6 (L) 19.6 - 45.3 %    Monocyte % 8.6 5.0 - 12.0 %    Eosinophil % 1.9 0.3 - 6.2 %    Basophil % 1.0 0.0 - 1.5 %    Immature Grans % 0.6 (H) 0.0 - 0.5 %    Neutrophils, Absolute 2.23 1.70 - 7.00 10*3/mm3    Lymphocytes, Absolute 0.52 (L) 0.70 - 3.10 10*3/mm3    Monocytes, Absolute 0.27 0.10 - 0.90 10*3/mm3    Eosinophils, Absolute 0.06 0.00 - 0.40 10*3/mm3    Basophils, Absolute 0.03 0.00 - 0.20 10*3/mm3    Immature Grans, Absolute 0.02 0.00 - 0.05 10*3/mm3    nRBC 0.0 0.0 - 0.2 /100 WBC   POC Glucose Once    Collection Time: 10/04/21  9:10 PM    Specimen: Blood   Result Value  Ref Range    Glucose 448 (C) 70 - 130 mg/dL   Comprehensive Metabolic Panel    Collection Time: 10/04/21  9:19 PM    Specimen: Blood   Result Value Ref Range    Glucose 391 (H) 65 - 99 mg/dL    BUN 23 (H) 6 - 20 mg/dL    Creatinine 1.06 (H) 0.57 - 1.00 mg/dL    Sodium 135 (L) 136 - 145 mmol/L    Potassium 3.9 3.5 - 5.2 mmol/L    Chloride 98 98 - 107 mmol/L    CO2 22.6 22.0 - 29.0 mmol/L    Calcium 9.5 8.6 - 10.5 mg/dL    Total Protein 7.1 6.0 - 8.5 g/dL    Albumin 4.20 3.50 - 5.20 g/dL    ALT (SGPT) 32 1 - 33 U/L    AST (SGOT) 37 (H) 1 - 32 U/L    Alkaline Phosphatase 161 (H) 39 - 117 U/L    Total Bilirubin 1.5 (H) 0.0 - 1.2 mg/dL    eGFR Non African Amer 53 (L) >60 mL/min/1.73    Globulin 2.9 gm/dL    A/G Ratio 1.4 g/dL    BUN/Creatinine Ratio 21.7 7.0 - 25.0    Anion Gap 14.4 5.0 - 15.0 mmol/L   Green Top (Gel)    Collection Time: 10/04/21  9:19 PM   Result Value Ref Range    Extra Tube Hold for add-ons.    Lavender Top    Collection Time: 10/04/21  9:19 PM   Result Value Ref Range    Extra Tube hold for add-on    Gold Top - SST    Collection Time: 10/04/21  9:19 PM   Result Value Ref Range    Extra Tube Hold for add-ons.    Light Blue Top    Collection Time: 10/04/21  9:19 PM   Result Value Ref Range    Extra Tube hold for add-on    CBC Auto Differential    Collection Time: 10/04/21  9:19 PM    Specimen: Blood   Result Value Ref Range    WBC 5.35 3.40 - 10.80 10*3/mm3    RBC 4.21 3.77 - 5.28 10*6/mm3    Hemoglobin 13.3 12.0 - 15.9 g/dL    Hematocrit 38.2 34.0 - 46.6 %    MCV 90.7 79.0 - 97.0 fL    MCH 31.6 26.6 - 33.0 pg    MCHC 34.8 31.5 - 35.7 g/dL    RDW 15.2 12.3 - 15.4 %    RDW-SD 50.6 37.0 - 54.0 fl    MPV 10.0 6.0 - 12.0 fL    Platelets 96 (L) 140 - 450 10*3/mm3    Neutrophil % 69.2 42.7 - 76.0 %    Lymphocyte % 17.4 (L) 19.6 - 45.3 %    Monocyte % 9.7 5.0 - 12.0 %    Eosinophil % 2.4 0.3 - 6.2 %    Basophil % 0.6 0.0 - 1.5 %    Immature Grans % 0.7 (H) 0.0 - 0.5 %    Neutrophils, Absolute 3.70 1.70 - 7.00  10*3/mm3    Lymphocytes, Absolute 0.93 0.70 - 3.10 10*3/mm3    Monocytes, Absolute 0.52 0.10 - 0.90 10*3/mm3    Eosinophils, Absolute 0.13 0.00 - 0.40 10*3/mm3    Basophils, Absolute 0.03 0.00 - 0.20 10*3/mm3    Immature Grans, Absolute 0.04 0.00 - 0.05 10*3/mm3    nRBC 0.0 0.0 - 0.2 /100 WBC   Beta Hydroxybutyrate Quantitative    Collection Time: 10/04/21  9:19 PM    Specimen: Blood   Result Value Ref Range    Beta-Hydroxybutyrate Quant 0.129 0.020 - 0.270 mmol/L   Urinalysis With Microscopic If Indicated (No Culture) - Urine, Clean Catch    Collection Time: 10/04/21  9:26 PM    Specimen: Urine, Clean Catch   Result Value Ref Range    Color, UA Yellow Yellow, Straw    Appearance, UA Clear Clear    pH, UA 5.5 5.0 - 8.0    Specific Gravity, UA >=1.030 1.005 - 1.030    Glucose, UA >=1000 mg/dL (3+) (A) Negative    Ketones, UA Negative Negative    Bilirubin, UA Negative Negative    Blood, UA Small (1+) (A) Negative    Protein, UA Trace (A) Negative    Leuk Esterase, UA Negative Negative    Nitrite, UA Negative Negative    Urobilinogen, UA 0.2 E.U./dL 0.2 - 1.0 E.U./dL   Urinalysis, Microscopic Only - Urine, Clean Catch    Collection Time: 10/04/21  9:26 PM    Specimen: Urine, Clean Catch   Result Value Ref Range    RBC, UA 0-2 None Seen, 0-2 /HPF    WBC, UA 6-12 (A) None Seen, 0-2 /HPF    Bacteria, UA None Seen None Seen /HPF    Squamous Epithelial Cells, UA 0-2 None Seen, 0-2 /HPF    Yeast, UA Small/1+ Budding Yeast None Seen /HPF    Hyaline Casts, UA None Seen None Seen /LPF    WBC Clumps, UA Small/1+ None Seen /HPF    Methodology Manual Light Microscopy    POC Glucose Once    Collection Time: 10/04/21 10:37 PM    Specimen: Blood   Result Value Ref Range    Glucose 288 (H) 70 - 130 mg/dL       Ordered the above labs and independently reviewed the results.        RADIOLOGY  No Radiology Exams Resulted Within Past 24 Hours    I ordered the above noted radiological studies. Reviewed by me and discussed with radiologist.   See dictation for official radiology interpretation.      PROCEDURES    Procedures      MEDICATIONS GIVEN IN ER    Medications   sodium chloride 0.9 % flush 10 mL (has no administration in time range)         PROGRESS, DATA ANALYSIS, CONSULTS, AND MEDICAL DECISION MAKING    All labs have been independently reviewed by me.  All radiology studies have been reviewed by me and discussed with radiologist dictating the report.   EKG's independently viewed and interpreted by me.  Discussion below represents my analysis of pertinent findings related to patient's condition, differential diagnosis, treatment plan and final disposition.    Differential diagnosis includes HHS, DKA, hyperglycemia without complication.    ED Course as of Oct 04 2253   Mon Oct 04, 2021   2243 Glucose(!): 288 [TD]   2243 Anion Gap: 14.4 [TD]   2243 Creatinine(!): 1.06 [TD]   2243 Sodium(!): 135 [TD]   2243 Beta-Hydroxybutyrate Quant: 0.129 [TD]      ED Course User Index  [TD] Michele Farrar II, MD       On medical chart review, patient saw Dr. Burnett on 4/22/2021.  She is a type II diabetic diagnosed in her 20s.  She is on Levemir and short acting insulin.  At that time, she had not been using sliding scale insulin as much as she should have been.    Given absence of DKA or HHS lab abnormalities, I believe the patient is safe and appropriate for discharge home.        PPE: The patient wore a surgical mask throughout the entire patient encounter. I wore an N95.    AS OF 22:53 EDT VITALS:    BP - 156/76  HR - 98  TEMP - 98 °F (36.7 °C) (Tympanic)  O2 SATS - 98%        DIAGNOSIS  Final diagnoses:   Type 2 diabetes mellitus with hyperglycemia, with long-term current use of insulin ([facility])         DISPOSITION  DISCHARGE    FOLLOW-UP  Kulwant Martínez, APRN  [address] [facility] [address]  [facility] [id number] [phone]    Schedule an appointment as soon as possible for a visit   As needed    Merary Burnett MD  [address]  [id number]  [phone]    Call   if your blood sugar continues to remain poorly controlled         Medication List      Changed    ALPRAZolam 0.5 MG tablet  Commonly known as: XANAX  Take 1 tablet by mouth 2 (Two) Times a Day As Needed for Anxiety or Sleep.  What changed: how much to take     cholestyramine light 4 g powder  Take 1 packet by mouth 2 (Two) Times a Day.  What changed: how much to take     ferrous sulfate 325 (65 FE) MG tablet  TAKE ONE TABLET BY MOUTH TWICE A DAY  What changed: when to take this     Levemir FlexTouch 100 UNIT/ML injection  Generic drug: insulin detemir  Inject 80 Units under the skin into the appropriate area as directed 2 (Two) Times a Day.  What changed: how much to take     spironolactone 100 MG tablet  Commonly known as: ALDACTONE  TAKE ONE TABLET BY MOUTH DAILY  What changed:   · when to take this  · reasons to take this                     Michele Farrar II, MD  10/04/21 [id number]

## 2021-10-07 ENCOUNTER — OFFICE VISIT (OUTPATIENT)
Dept: ENDOCRINOLOGY | Age: 61
End: 2021-10-07

## 2021-10-07 VITALS
HEART RATE: 100 BPM | OXYGEN SATURATION: 97 % | SYSTOLIC BLOOD PRESSURE: 122 MMHG | DIASTOLIC BLOOD PRESSURE: 58 MMHG | BODY MASS INDEX: 30.61 KG/M2 | HEIGHT: 67 IN | WEIGHT: 195 LBS

## 2021-10-07 DIAGNOSIS — E11.65 TYPE 2 DIABETES MELLITUS WITH HYPERGLYCEMIA, WITH LONG-TERM CURRENT USE OF INSULIN (HCC): Primary | ICD-10-CM

## 2021-10-07 DIAGNOSIS — Z79.4 TYPE 2 DIABETES MELLITUS WITH HYPERGLYCEMIA, WITH LONG-TERM CURRENT USE OF INSULIN (HCC): Primary | ICD-10-CM

## 2021-10-07 PROCEDURE — 99214 OFFICE O/P EST MOD 30 MIN: CPT | Performed by: NURSE PRACTITIONER

## 2021-10-07 RX ORDER — INSULIN GLARGINE 300 U/ML
110 INJECTION, SOLUTION SUBCUTANEOUS DAILY
Qty: 11 ML | Refills: 2 | Status: SHIPPED | OUTPATIENT
Start: 2021-10-07 | End: 2021-12-30

## 2021-10-07 NOTE — PATIENT INSTRUCTIONS
Change levemir to 110u of toujeo once daily     Humalog dosing before meals:    BS <80: 0 units  -120: 10 units  -150: 20 units  -200: 30 units  -250: 40 units  -300: 50 units  >300: 60 units     Make sure to bring BS log and amount of insulin that was taken to your next visit        [phone]

## 2021-10-07 NOTE — PROGRESS NOTES
"Chief Complaint  Hospital Follow Up Visit    Subjective          Silvia Zabala presents to [facility] ENDOCRINOLOGY  History of Present Illness  I have reviewed PMH, allergies and medications UTD at this visit      [place] so she has multiple ongoing complicated medical history    Leading up to ER trip for hyperglycemia she was struggling with hypoglycemia while traveling for deaths in the family as low as 38. Also struggles with gastroparesis. BS at hospital was 725 and before leaving 288     Multiple deaths in the family recently causing her blood sugars to vary drastically      Type 2 dm   Diagnosed in her 20's     Today in clinic pt reports being on levemir 60 units bid, admelog 40-60 units tid ac- this is the same she was taken at her last visit  She is scared to adjust her insulin r/t gastroparesis and fear of hypoglycemia   Checks BG - 4 - 5 times a day 200-300s  Sensor - Freestyle jesusita  Dm retinopathy - yes  Dm nephropathy - no  Dm neuropathy - yes, reports daily diabetic foot care Dm neuropathy meds - on lyrica  CAD -no  CVA - ? TIA  Episodes of hypoglycemia - yes, prior to ER visit   Pt is not physically active. weight has been stable.   Pt tries to follow DM diet for most part.   On Ace inb.  Checks ketones      Pancreatic divisum : No hx of pancreatitis per pt.       Liver disease/DUKES -  Sees Dr. Jordan from [facility]. Awaiting liver transplant        Gastroparesis - Sees Dr. Coleman. Recently admitted in April 2021 with this.       ITP - diagnosed in May 2018. Currently stable. Seen by Hematology and oncology.       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      LUPUS/ epilepsy      Objective   Vital Signs:   /58   Pulse 100   Ht 170.2 cm (67\")   Wt 88.5 kg (195 lb)   SpO2 97%   BMI 30.54 kg/m²     Physical Exam  Vitals reviewed.   Constitutional:       General: She is not in acute distress.  HENT:      Head: Normocephalic and atraumatic.   Cardiovascular:      Rate " and Rhythm: Normal rate and regular rhythm.   Pulmonary:      Effort: Pulmonary effort is normal. No respiratory distress.   Musculoskeletal:         General: No signs of injury. Normal range of motion.      Cervical back: Normal range of motion and neck supple.   Skin:     General: Skin is warm and dry.   Neurological:      Mental Status: She is alert and oriented to person, place, and time. Mental status is at baseline.   Psychiatric:         Mood and Affect: Mood normal.         Behavior: Behavior normal.         Thought Content: Thought content normal.         Judgment: Judgment normal.          Result Review :   The following data was reviewed by: JOEL Cuellar on 10/07/2021:  Common labs    Common Labsle 6/14/21 6/14/21 8/16/21 8/16/21 10/4/21 10/4/21 10/4/21 10/4/21    1512 1512 1419 1419 1220 1220 2119 2119   Glucose  93    725 (A) 391 (A)    Glucose   412 (A)        BUN  19 21 (A)   23 (A) 23 (A)    Creatinine  0.96 1.02 (A)   0.95 1.06 (A)    eGFR Non African Am  60 (A) 55 (A)   60 (A) 53 (A)    eGFR African Am   67        Sodium  140 133 (A)   127 (A) 135 (A)    Potassium  4.1 4.0   5.3 (A) 3.9    Chloride  105 101   94 (A) 98    Calcium  8.9 9.2   9.1 9.5    Albumin  3.70    3.70 4.20    Total Bilirubin  1.7 (A)    1.5 (A) 1.5 (A)    Alkaline Phosphatase  118 (A)    169 (A) 161 (A)    AST (SGOT)  47 (A)    38 (A) 37 (A)    ALT (SGPT)  29    24 32    WBC 1.22 (A)    3.13 (A)   5.35   Hemoglobin 11.9 (A)    12.0   13.3   Hematocrit 34.2    34.8   38.2   Platelets 81 (A)    76 (A)   96 (A)   Hemoglobin A1C    8.80 (A)       (A) Abnormal value       Comments are available for some flowsheets but are not being displayed.                     Assessment and Plan    Diagnoses and all orders for this visit:    1. Type 2 diabetes mellitus with hyperglycemia, with long-term current use of insulin (HCC) (Primary)  -     C-Peptide  -     Hemoglobin A1c  -     Anti-islet Cell Antibody  -     Insulin Antibody  -      Glutamic Acid Decarboxylase    Other orders  -     Insulin Glargine, 2 Unit Dial, (Toujeo Max SoloStar) 300 UNIT/ML solution pen-injector injection; Inject 110 Units under the skin into the appropriate area as directed Daily for 30 days.  Dispense: 11 mL; Refill: 2        Follow Up   No follow-ups on file.   Will see me in 6 weeks   Confirm type 1 vs type 2  Change to toujeo to help with wide fluctuation in blood sugars    Change levemir to 110u of toujeo once daily     Humalog dosing before meals:    BS <80: 0 units  -120: 10 units  -150: 20 units  -200: 30 units  -250: 40 units  -300: 50 units  >300: 60 units     Make sure to bring BS log and amount of insulin that was taken to your next visit      High risk of complications     Patient was given instructions and counseling regarding her condition or for health maintenance advice. Please see specific information pulled into the AVS if appropriate.     JOEL Cuellar

## 2021-10-11 ENCOUNTER — APPOINTMENT (OUTPATIENT)
Dept: LAB | Facility: HOSPITAL | Age: 61
End: 2021-10-11

## 2021-10-11 ENCOUNTER — OFFICE VISIT (OUTPATIENT)
Dept: ONCOLOGY | Facility: CLINIC | Age: 61
End: 2021-10-11

## 2021-10-11 VITALS
HEIGHT: 67 IN | BODY MASS INDEX: 29.71 KG/M2 | OXYGEN SATURATION: 96 % | WEIGHT: 189.3 LBS | RESPIRATION RATE: 18 BRPM | SYSTOLIC BLOOD PRESSURE: 148 MMHG | DIASTOLIC BLOOD PRESSURE: 70 MMHG | TEMPERATURE: 98.6 F | HEART RATE: 89 BPM

## 2021-10-11 DIAGNOSIS — D50.9 IRON DEFICIENCY ANEMIA, UNSPECIFIED IRON DEFICIENCY ANEMIA TYPE: ICD-10-CM

## 2021-10-11 DIAGNOSIS — M32.9 SYSTEMIC LUPUS ERYTHEMATOSUS, UNSPECIFIED SLE TYPE, UNSPECIFIED ORGAN INVOLVEMENT STATUS (HCC): ICD-10-CM

## 2021-10-11 DIAGNOSIS — K75.81 NASH (NONALCOHOLIC STEATOHEPATITIS): ICD-10-CM

## 2021-10-11 DIAGNOSIS — K76.6 PORTAL HYPERTENSION (HCC): ICD-10-CM

## 2021-10-11 DIAGNOSIS — D69.3 ACUTE ITP (HCC): ICD-10-CM

## 2021-10-11 DIAGNOSIS — D69.6 THROMBOCYTOPENIA (HCC): ICD-10-CM

## 2021-10-11 DIAGNOSIS — K31.84 GASTROPARESIS: ICD-10-CM

## 2021-10-11 DIAGNOSIS — D61.818 PANCYTOPENIA (HCC): Primary | ICD-10-CM

## 2021-10-11 PROCEDURE — 99213 OFFICE O/P EST LOW 20 MIN: CPT | Performed by: INTERNAL MEDICINE

## 2021-10-11 RX ORDER — OXYCODONE HYDROCHLORIDE 7.5 MG/1
7.5 TABLET ORAL 2 TIMES DAILY
COMMUNITY
Start: 2021-09-23

## 2021-10-11 RX ORDER — NALOXONE HYDROCHLORIDE 4 MG/.1ML
1 SPRAY NASAL AS NEEDED
COMMUNITY
Start: 2021-09-23

## 2021-10-11 RX ORDER — ALPRAZOLAM 1 MG/1
.5-1 TABLET ORAL AS NEEDED
COMMUNITY
Start: 2021-09-27

## 2021-10-11 NOTE — PROGRESS NOTES
REASON FOR FOLLOWUP:    1. 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   2. Evidence of B12 deficiency on her initial lab evaluation in April 2013.  Patient was started on parenteral B12 replacement.  The patient was switched from shots to daily oral B12 replacement after the visit of 12/02/2013.    3. Hemoglobin has significantly improved since banding of her esophageal varices and increase in her oral iron supplement to twice daily dosing.  4. Morphologically normal bone marrow from 08/20/2013 with normal flow cytometry.  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  Significance of this is uncertain.    5. Mixed connective tissue disorder, currently on Enbrel therapy.  6. Insulin-requiring diabetes mellitus.  7. Patient continues to follow-up periodically with the transplant team at [facility] but currently she is not on the transplant list.  8. 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 Rituxan therapy weekly ×4 completed 7/16/2018     HISTORY OF PRESENT ILLNESS:  Silvia is a 61 y.o. 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  She has had GI bleeding in the past due to portal hypertension.      She also developed acute ITP and received Rituxan weekly ×4.  She completed her Rituxan treatment on 7/16/2018.  She had a good response.  Her platelet count did not correct to normal but did return to her baseline platelet count of around 60,000-90,000.    She also had received 2 doses of IV iron therapy in the form of Injectafer 750 mg each dose delivered on 1/29/2019 and 2/13/2019.      Silvia again required hospitalization at [facility] from 5/28/2019 to 5/30/2019 due to symptomatic edema requiring diuretic therapy.  She also had been hospitalized at [facility] from 5/9/2019 to 5/14/2019 and on that admission she  underwent an esophageal varices banding procedure on 5/10/2019.    She received additional IV Injectafer on 6/26/2019 and 7/3/2019.    She has been in [place] taking care of her dying father for several months and has not been seen in our office since July 2019.  Her father has since passed away and she returned to [place] In December 2020.    While in [place] she also was very ill due to her cirrhosis and portal hypertension.  She had bleeding esophageal varices on several occasions requiring admission and transfusions.  She ended up undergoing a TI PS procedure.    Since her last visit to the office in January 2021 she has had several hospital admissions for complications related to her cirrhosis and diabetes.  Her latest admission was from 4/29/2021 to 5/4/2021 with protracted nausea and vomiting from gastroparesis.  She remains on Reglan.  Her last EGD was performed by Dr. Endy Vasquez.    She is here today for follow-up and had labs performed on 10/4/2021 showing normal iron panel and ferritin.  Her hemoglobin was 12 and her platelet count was relatively stable at 76,000.  She also had chemistries performed last week and at that time her blood sugar was greater than 700.  We contacted her endocrinologist and it was recommended that she be seen in the emergency room.  She did receive treatment in the emergency room and her blood sugar came down to 288.  She was not in diabetic ketoacidosis and was subsequently discharged home.    She is following up with Dr. Montes at [facility] regarding potential liver transplant.    Past Medical History, Past Surgical History, Social History, Family History have been reviewed and are without significant changes except as mentioned.      Review of Systems   Constitutional: Positive for fatigue and fever. Negative for activity change.   HENT: Negative for mouth sores, nosebleeds and trouble swallowing.    Respiratory: Negative for cough, shortness of breath and wheezing.    Cardiovascular:  "Negative for chest pain and palpitations.   Gastrointestinal: Positive for constipation, nausea and vomiting. Negative for diarrhea.   Genitourinary: Negative for dysuria and hematuria.   Musculoskeletal: Negative for arthralgias and myalgias.        Muscle cramps   Skin: Negative for rash and wound.   Neurological: Negative for seizures and syncope.   Hematological: Negative for adenopathy. Bruises/bleeds easily.   Psychiatric/Behavioral: Positive for dysphoric mood. Negative for confusion.       Medications:  The current medication list was reviewed in the EMR    ALLERGIES:    Allergies   Allergen Reactions   • Albuterol Anaphylaxis   • Imitrex [Sumatriptan] Anaphylaxis   • Tramadol Nausea Only, Other (See Comments) and GI Intolerance     Per pt causes her \"palsy, meaning shaking and tremors\"    • Coconut (Cocos Nucifera) Allergy Skin Test Nausea And Vomiting   • Nsaids Other (See Comments)     Told by MD not to take due to liver problems   • Tylenol [Acetaminophen] Other (See Comments)     Has liver problems and told by MD to not take   • Zofran [Ondansetron Hcl] Other (See Comments)     Pt states does not work to correct nausea and vomiting.    • Codeine Nausea Only, Rash and GI Intolerance   • Lactulose Nausea And Vomiting and Other (See Comments)     Severe abdominal pain   • Quinine Derivatives Nausea And Vomiting              Vitals:    10/11/21 1452   BP: 148/70   Pulse: 89   Resp: 18   Temp: 98.6 °F (37 °C)   TempSrc: Temporal   SpO2: 96%   Weight: 85.9 kg (189 lb 4.8 oz)   Height: 170.2 cm (67.01\")   PainSc: 0-No pain     Physical Exam    CONSTITUTIONAL:  Vital signs reviewed.  No distress, looks comfortable.  EYES:  Conjunctiva and lids unremarkable.  PERRLA  EARS,NOSE,MOUTH,THROAT:  Ears and nose appear unremarkable.  Lips, teeth, gums appear unremarkable.  RESPIRATORY:  Normal respiratory effort.  Lungs clear to auscultation bilaterally.  CARDIOVASCULAR:  Normal S1, S2.  No murmurs rubs or gallops.  No " significant lower extremity edema.  GASTROINTESTINAL: Abdomen appears distended with positive fluid wave   MUSCULOSKELETAL:  Unremarkable digits/nails.  No cyanosis or clubbing.  SKIN:  Warm.  No rashes.  PSYCHIATRIC: She seems depressed.  She is on Xifaxan for hepatic encephalopathy.  I have reexamined the patient and the results are consistent with the previously documented exam. Sukhdeep Mcdowell MD      RECENT LABS:  No results found for: WBC, HGB, PLT  Lab Results   Component Value Date    NEUTROABS 3.70 10/04/2021     Lab Results   Component Value Date    IRON 99 10/04/2021    TIBC 426 10/04/2021    FERRITIN 118.00 10/04/2021   SAT 23%    Lab Results   Component Value Date    GLUCOSE 391 (H) 10/04/2021    BUN 23 (H) 10/04/2021    CREATININE 1.06 (H) 10/04/2021    EGFRIFNONA 53 (L) 10/04/2021    EGFRIFAFRI 67 08/16/2021    BCR 21.7 10/04/2021    K 3.9 10/04/2021    CO2 22.6 10/04/2021    CALCIUM 9.5 10/04/2021    PROTENTOTREF 5.7 (L) 05/03/2021    ALBUMIN 4.20 10/04/2021    LABIL2 1.2 05/03/2021    AST 37 (H) 10/04/2021    ALT 32 10/04/2021             CT ABDOMEN PELVIS W CONTRAST- 4/29/2021  IMPRESSION:   1.  Cirrhotic liver morphology with sequela of portal hypertension  including portal venous dilatation and splenomegaly. Mild eccentric  hypoattenuation within the superior aspect of a TIPS is unchanged and  may represent nonocclusive thrombus.  2.  Distal esophageal and gastric wall thickening have slightly  improved, although, the stomach is poorly distended and not well  evaluated. Distal esophageal wall thickening can be seen with reflux  esophagitis. Consider follow-up upper endoscopy to exclude an underlying  lesion.  3.  No bowel obstruction or CT evidence of acute appendicitis.      ASSESSMENT/PLAN:     *ITP.   Her platelets now seem to be back at her baseline in the 50,000 - 100,000 range from hypersplenism.  Platelets are 76,000 today    *Chronic pancytopenia due to hypersplenism, due to  cirrhosis.     *B12 deficiency.  On oral B12 since December 2013.  Recent B12 level greater than 2000     *History of esophageal varices banding.  She since has undergone a TI PS procedure     *Iron deficiency anemia due to GI bleeding.  Her hemoglobin is improved with oral iron supplementation.  Repeat iron studies from 10/4/2021 were normal as noted above although her iron saturation was low at 13%.    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  This is of unknown significance.     *Rheumatoid arthritis.       *Lupus     *Diabetes.    Severe hyperglycemia last week with blood sugar greater than 700.  This prompted a visit to the ER but she thankfully was not in ketoacidosis.      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      *Gastroparesis.  Improved with Reglan.  She is worried about developing tar dive dyskinesia.        Plan  1.  We have asked Silvia to continue her oral iron and B12 supplementation for now.    2.  She will be scheduled for lab in 3 months including a CBC, iron panel, ferritin, serum chemistries and B12 level  3.  MD follow-up in 6 months with the same labs drawn 1 week prior to the visit

## 2021-10-13 LAB
C PEPTIDE SERPL-MCNC: 3.1 NG/ML (ref 1.1–4.4)
GAD65 AB SER IA-ACNC: <5 U/ML (ref 0–5)
HBA1C MFR BLD: 9.4 % (ref 4.8–5.6)
INSULIN AB SER-ACNC: 100 UU/ML
PANC ISLET CELL AB TITR SER: NEGATIVE {TITER}

## 2021-10-14 ENCOUNTER — TELEPHONE (OUTPATIENT)
Dept: ENDOCRINOLOGY | Age: 61
End: 2021-10-14

## 2021-10-26 NOTE — PROGRESS NOTES
Please notify the patient that she is producing her own insulin so there are no signs that she is a true type I diabetic however she does have some antibodies that attacked her insulin cells.  The gray area between type I and type 2 diabetes can sometimes be referred to as type 1.5 which I feel is about where she is at.    When it comes to the once weekly injection like Trulicity, I do not think this would be the best idea with your current and ongoing gastroparesis.    I would recommend slowly increasing your insulin as I know you are worried about hypoglycemia and can start with  levemir 62 units bid and admelog 42-62 units tid ac    Another medication we could use would be Farxiga or Jardiance, these medications help urinate extra sugar out.  The biggest contraindication for this medication is someone that struggles with ongoing yeast infections.  Would you be interested in this medication, or would you like to try adjusting your insulin before making additional medication changes

## 2021-11-05 NOTE — TELEPHONE ENCOUNTER
Balbir Acosta will be hired on to be NEMOPTIC. [facility] needs orders for her to be there 2 hours daily for SN. Will you approve orders for SN home care.      [facility] needs the LOV sent to [phone] Pt's GYN prescribed vaginal estrogen, but her pharmacist states she should not use these with her history of cirrhosis.    She wants to know if Dr Coleman's thinks it is okay use vaginal estrogen tablets?  If not, is there anything else that would be safe to use?    All d/w Dr Coleman, he is okay with pt using vaginal estrogen.    5/3/19, attempted to call pt but no answer and voicemail full.    Pt called, and notified of above.

## 2021-11-29 ENCOUNTER — SPECIALTY PHARMACY (OUTPATIENT)
Dept: ENDOCRINOLOGY | Age: 61
End: 2021-11-29

## 2021-12-20 ENCOUNTER — OFFICE VISIT (OUTPATIENT)
Dept: ORTHOPEDIC SURGERY | Facility: CLINIC | Age: 61
End: 2021-12-20

## 2021-12-20 VITALS — BODY MASS INDEX: 29.03 KG/M2 | TEMPERATURE: 96.9 F | WEIGHT: 185 LBS | HEIGHT: 67 IN | RESPIRATION RATE: 20 BRPM

## 2021-12-20 DIAGNOSIS — M25.512 LEFT SHOULDER PAIN, UNSPECIFIED CHRONICITY: Primary | ICD-10-CM

## 2021-12-20 DIAGNOSIS — M75.101 TEAR OF RIGHT ROTATOR CUFF, UNSPECIFIED TEAR EXTENT, UNSPECIFIED WHETHER TRAUMATIC: ICD-10-CM

## 2021-12-20 PROCEDURE — 73030 X-RAY EXAM OF SHOULDER: CPT | Performed by: ORTHOPAEDIC SURGERY

## 2021-12-20 PROCEDURE — 99213 OFFICE O/P EST LOW 20 MIN: CPT | Performed by: ORTHOPAEDIC SURGERY

## 2021-12-20 NOTE — PROGRESS NOTES
"   New Shoulder      Patient: Silvia Zabala        YOB: 1960    Medical Record Number: [id number]        Chief Complaints:       History of Present Illness: This is a        Allergies:   Allergies   Allergen Reactions   • Albuterol Anaphylaxis   • Imitrex [Sumatriptan] Anaphylaxis   • Tramadol Nausea Only, Other (See Comments) and GI Intolerance     Per pt causes her \"palsy, meaning shaking and tremors\"    • Coconut (Cocos Nucifera) Allergy Skin Test Nausea And Vomiting   • Nsaids Other (See Comments)     Told by MD not to take due to liver problems   • Tylenol [Acetaminophen] Other (See Comments)     Has liver problems and told by MD to not take   • Zofran [Ondansetron Hcl] Other (See Comments)     Pt states does not work to correct nausea and vomiting.    • Codeine Nausea Only, Rash and GI Intolerance   • Lactulose Nausea And Vomiting and Other (See Comments)     Severe abdominal pain   • Quinine Derivatives Nausea And Vomiting       Medications:   Home Medications:  Current Outpatient Medications on File Prior to Visit   Medication Sig   • ALPRAZolam (XANAX) 0.5 MG tablet Take 1 tablet by mouth 2 (Two) Times a Day As Needed for Anxiety or Sleep. (Patient taking differently: Take 1 mg by mouth 2 (Two) Times a Day As Needed for Anxiety or Sleep.)   • ALPRAZolam (XANAX) 1 MG tablet    • azelastine (ASTELIN) 0.1 % nasal spray 2 sprays into the nostril(s) as directed by provider 2 (Two) Times a Day. Use in each nostril as directed   • Cholecalciferol (Vitamin D3) 25 MCG (1000 UT) capsule Take 1,000 Units by mouth Daily.   • cholestyramine light 4 g powder Take 1 packet by mouth 2 (Two) Times a Day. (Patient taking differently: Take 8 g by mouth 2 (Two) Times a Day.)   • Continuous Blood Gluc Sensor (FreeStyle Roseline 14 Day Sensor) misc Use as directed.   • Continuous Blood Gluc Sensor (FreeStyle Roseline Sensor System) 1 Device Every 14 (Fourteen) Days.   • Docusate Sodium (COLACE PO) Take 2 capsules by " mouth every night at bedtime.   • esomeprazole (nexIUM) 40 MG capsule TAKE ONE CAPSULE BY MOUTH EVERY MORNING BEFORE BREAKFAST   • ferrous sulfate 325 (65 FE) MG tablet TAKE ONE TABLET BY MOUTH TWICE A DAY (Patient taking differently: Take 325 mg by mouth 2 (two) times a day.)   • FLUoxetine (PROzac) 10 MG capsule Take 20 mg by mouth Daily.   • fluticasone (FLONASE) 50 MCG/ACT nasal spray 2 sprays into the nostril(s) as directed by provider Daily.   • glucose blood test strip by Other route As Needed.   • hydrOXYzine (ATARAX) 25 MG tablet Take 2 tablets by mouth Every Night.   • Insulin Glargine, 2 Unit Dial, (Toujeo Max SoloStar) 300 UNIT/ML solution pen-injector injection Inject 110 Units under the skin into the appropriate area as directed Daily for 30 days.   • insulin lispro (humaLOG) 100 UNIT/ML injection Inject 70 Units under the skin into the appropriate area as directed 3 (Three) Times a Day.   • Insulin Pen Needle (True Comfort Pro Pen Needles) 32G X 4 MM misc 1 each 2 (Two) Times a Day.   • levETIRAcetam (KEPPRA) 500 MG tablet TAKE ONE TABLET BY MOUTH TWICE A DAY   • Magnesium Oxide 400 MG capsule Take 400 mg by mouth Every Night.   • metaxalone (Skelaxin) 800 MG tablet Every 8 (Eight) Hours.   • metoclopramide (REGLAN) 10 MG tablet Take 1 tablet by mouth 4 (Four) Times a Day Before Meals & at Bedtime.   • mirtazapine (REMERON) 15 MG tablet Take 30 mg by mouth Every Night.   • Multiple Vitamins-Minerals (MULTIVITAMIN WITH MINERALS) tablet tablet Take 1 tablet by mouth Daily.   • Narcan 4 MG/0.1ML nasal spray    • NovoLOG 100 UNIT/ML injection Inject 60 units under the skin  3 times daily  With meals   • Oxaydo 7.5 MG tablet    • polyethylene glycol (MIRALAX) powder Take 17 g by mouth Daily As Needed.   • Prasterone (Intrarosa) 6.5 MG insert Insert 1 each into the vagina 2 (Two) Times a Week.   • pregabalin (LYRICA) 75 MG capsule Take 75 mg by mouth 2 (Two) Times a Day.   • promethazine (PHENERGAN) 25 MG  tablet Take 25 mg by mouth Every 8 (Eight) Hours As Needed for Nausea or Vomiting.   • riFAXIMin (Xifaxan) 550 MG tablet Take 1 tablet by mouth 2 (Two) Times a Day.   • sertraline (ZOLOFT) 50 MG tablet Take 50 mg by mouth Daily.   • spironolactone (ALDACTONE) 100 MG tablet TAKE ONE TABLET BY MOUTH DAILY (Patient taking differently: Take 100 mg by mouth Daily As Needed (swelling).)   • vitamin B-12 (CYANOCOBALAMIN) 1000 MCG tablet Take 1,000 mcg by mouth Daily.   • zinc gluconate 50 MG tablet Take 50 mg by mouth Daily.   • zolpidem (AMBIEN) 5 MG tablet 5 mg Every Night.     No current facility-administered medications on file prior to visit.     Current Medications:  Scheduled Meds:  Continuous Infusions:No current facility-administered medications for this visit.    PRN Meds:.    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   • Cirrhosis (HCC)    • Clostridium difficile infection    • Colon polyps    • Depression    • Diabetes mellitus (HCC)     Adult onset, insulin requiring   • Duodenal ulcer with hemorrhage    • Esophageal varices determined by endoscopy (HCC)    • Fibromyalgia    • Gallbladder disease    • Gastric varices    • Gastroparesis    • Glaucoma    • Granuloma annulare    • H/O B12 deficiency    • H/O Bleeding ulcer     And gastroesophageal varices   • H/O mixed connective tissue disorder    • Hemorrhoids    • Hiatal hernia    • History of transfusion     needs bendryl  flushes sensation and temp goes up   • Hx of being hospitalized     In [place] for GI bleeding from ulcer and varices   • Hyperlipidemia    • Migraine    • Mitral valve prolapse    • DUKES (nonalcoholic steatohepatitis) 11/2016   • Orthostatic hypotension 02/2019   • BRICE (obstructive sleep apnea)    • Pancreas divisum     • Pancytopenia (HCC)     Chronic, due to cirrhosis and hypersplenism   • Peptic ulcer disease     And esophageal varices   • PONV (postoperative nausea and vomiting)    • RA (rheumatoid arthritis) (HCC)    • Seizure disorder (HCC)     last 2003   • Seizures (HCC)    • Systemic lupus (HCC)    • TIA (transient ischemic attack) 1984        Past Surgical History:   Procedure Laterality Date   • BARTHOLIN CYST MARSUPIALIZATION Left 1/18/2021    Procedure: EXCISION OF LABIAL CYST;  Surgeon: Melinda Thakkar MD;  Location: [facility] MAIN OR;  Service: Obstetrics/Gynecology;  Laterality: Left;   • BLADDER REPAIR  1991   • CATARACT EXTRACTION     • CHOLECYSTECTOMY  1994   • COLONOSCOPY     • ENDOSCOPY N/A 11/7/2016    Procedure: ESOPHAGOGASTRODUODENOSCOPY WITH COLD POLYPECTOMY, BANDING,  AND CLIPS TIMES 2;  Surgeon: Rich Coleman MD;  Location: [facility] ENDOSCOPY;  Service:    • ENDOSCOPY N/A 12/22/2016    Procedure: ESOPHAGOGASTRODUODENOSCOPY WITH ESOPHAGEAL BANDING. 5 BANDS FIRED, 4 BANDS ADHERERD TO MUCOSA;  Surgeon: Rich Coleman MD;  Location: [facility] ENDOSCOPY;  Service:    • ENDOSCOPY N/A 2/15/2017    Procedure: ESOPHAGOGASTRODUODENOSCOPY AT BEDSIDE with esophageal varices banding (3);  Surgeon: Rich Coleman MD;  Location: [facility] ENDOSCOPY;  Service:    • ENDOSCOPY N/A 4/6/2017    Procedure: ESOPHAGOGASTRODUODENOSCOPY WITH ESOPHAGEAL VARICES BANDING x2;  Surgeon: Rich Coleman MD;  Location: [facility] ENDOSCOPY;  Service:    • ENDOSCOPY N/A 11/24/2017    Procedure: ESOPHAGOGASTRODUODENOSCOPY with biopsies;  Surgeon: Rich Coleman MD;  Location: [facility] ENDOSCOPY;  Service:    • ENDOSCOPY N/A 10/5/2018    Procedure: ESOPHAGOGASTRODUODENOSCOPY;  Surgeon: Rich Coleman MD;  Location: [facility] ENDOSCOPY;  Service: Gastroenterology   • ENDOSCOPY N/A 5/10/2019    Procedure: ESOPHAGOGASTRODUODENOSCOPY AT BEDSIDE WITH VARICEAL LIGATION;  Surgeon: Rich Coleman MD;  Location: [facility] ENDOSCOPY;  Service:  "Gastroenterology   • ENDOSCOPY N/A 2019    Procedure: ESOPHAGOGASTRODUODENOSCOPY WITH ESOPHAGEAL BANDING (3 BANDS);  Surgeon: Rich Coleman MD;  Location[facility];  Service: Gastroenterology   • ENDOSCOPY N/A 4/3/2021    Procedure: ESOPHAGOGASTRODUODENOSCOPY WITH COLD BIOPSIES & BRUSHINGS;  Surgeon: Endy Vasquez MD;  Location:  [facility];  Service: Gastroenterology;  Laterality: N/A;  PRE- N,V & DYSPHAGIA  POST- GASTRITIS, POSSIBLE FUNGAL ESOPHAGITIS   • ENDOSCOPY AND COLONOSCOPY      Dr. Rich Coleman with findings of candida esophagitis   • EYE SURGERY     • HYSTERECTOMY  1986    partial   • JOINT REPLACEMENT     • KNEE SURGERY Right     \"right knee recontstruction\"   • LIVER BIOPSY  2015   • MASS EXCISION     • STOMACH SURGERY     • TIPS PROCEDURE  2020    x2        Social History     Occupational History     Employer: DISABLED   Tobacco Use   • Smoking status: Former Smoker     Packs/day: 1.00     Years: 4.00     Pack years: 4.00     Types: Cigarettes     Quit date: 2015     Years since quittin.0   • Smokeless tobacco: Never Used   Vaping Use   • Vaping Use: Never used   Substance and Sexual Activity   • Alcohol use: No   • Drug use: Never   • Sexual activity: Defer      Social History     Social History Narrative    Moved to [place] from [place]. She is currently medically disabled.        Family History   Problem Relation Age of Onset   • Liver disease Other    • Depression Other    • Heart disease Other    • Hypertension Other    • Diabetes Other    • Breast cancer Other    • Brain cancer Other    • Uterine cancer Other    • Colon cancer Other    • Leukemia Other    • Colon cancer Maternal Aunt    • Hypertension Mother    • Diabetes type II Mother    • Rheum arthritis Mother    • Liver disease Mother         DUKES   • Heart disease Father    • Diabetes type II Father    • Diabetes type II Sister    • Lupus Sister    • Malig Hyperthermia Neg Hx  "             Review of Systems: ***    Review of Systems      Physical Exam: 61 y.o. female  General Appearance:    Alert, cooperative, in no acute distress                 There were no vitals filed for this visit.   Patient is alert and read ×3 no acute distress appears her above-listed at height weight and age.  Affect is normal respiratory rate is normal unlabored. Heart rate regular rate rhythm, sclera, dentition and hearing are normal for the purpose of this exam.    Ortho Exam    Procedures          Radiology:   AP, Scapular Y and Axillary Lateral of the *** shoulder were ordered/reviewed to evauate shoulder pain.  Imaging Results (Most Recent)     None        Assessment/Plan:  {georgerxplan:40140}

## 2021-12-20 NOTE — PROGRESS NOTES
"  Patient: Silvia Zabala  YOB: 1960  Date of Service: 12/20/2021    Chief Complaints: Left shoulder pain    Subjective:    History of Present Illness: Pt is seen in the office today with complaints of left shoulder pain I last saw her for her right shoulder first of this year that was doing good left shoulder have also seen her for time for that we did an injection it really did not last she states a couple of weeks at the most she has significant night pain she does have a history of fracture in the proximal humerus on the left side about 4 years ago.  She states is pretty limited she feels like she is getting weaker.          Allergies:   Allergies   Allergen Reactions   • Albuterol Anaphylaxis   • Imitrex [Sumatriptan] Anaphylaxis   • Tramadol Nausea Only, Other (See Comments) and GI Intolerance     Per pt causes her \"palsy, meaning shaking and tremors\"    • Coconut (Cocos Nucifera) Allergy Skin Test Nausea And Vomiting   • Nsaids Other (See Comments)     Told by MD not to take due to liver problems   • Tylenol [Acetaminophen] Other (See Comments)     Has liver problems and told by MD to not take   • Zofran [Ondansetron Hcl] Other (See Comments)     Pt states does not work to correct nausea and vomiting.    • Codeine Nausea Only, Rash and GI Intolerance   • Lactulose Nausea And Vomiting and Other (See Comments)     Severe abdominal pain   • Quinine Derivatives Nausea And Vomiting       Medications:   Home Medications:  Current Outpatient Medications on File Prior to Visit   Medication Sig   • ALPRAZolam (XANAX) 0.5 MG tablet Take 1 tablet by mouth 2 (Two) Times a Day As Needed for Anxiety or Sleep. (Patient taking differently: Take 1 mg by mouth 2 (Two) Times a Day As Needed for Anxiety or Sleep.)   • ALPRAZolam (XANAX) 1 MG tablet    • azelastine (ASTELIN) 0.1 % nasal spray 2 sprays into the nostril(s) as directed by provider 2 (Two) Times a Day. Use in each nostril as directed   • " Cholecalciferol (Vitamin D3) 25 MCG (1000 UT) capsule Take 1,000 Units by mouth Daily.   • cholestyramine light 4 g powder Take 1 packet by mouth 2 (Two) Times a Day. (Patient taking differently: Take 8 g by mouth 2 (Two) Times a Day.)   • Continuous Blood Gluc Sensor (FreeStyle Roseline 14 Day Sensor) misc Use as directed.   • Continuous Blood Gluc Sensor (FreeStyle Roseline Sensor System) 1 Device Every 14 (Fourteen) Days.   • Docusate Sodium (COLACE PO) Take 2 capsules by mouth every night at bedtime.   • esomeprazole (nexIUM) 40 MG capsule TAKE ONE CAPSULE BY MOUTH EVERY MORNING BEFORE BREAKFAST   • ferrous sulfate 325 (65 FE) MG tablet TAKE ONE TABLET BY MOUTH TWICE A DAY (Patient taking differently: Take 325 mg by mouth 2 (two) times a day.)   • FLUoxetine (PROzac) 10 MG capsule Take 20 mg by mouth Daily.   • fluticasone (FLONASE) 50 MCG/ACT nasal spray 2 sprays into the nostril(s) as directed by provider Daily.   • glucose blood test strip by Other route As Needed.   • hydrOXYzine (ATARAX) 25 MG tablet Take 2 tablets by mouth Every Night.   • Insulin Glargine, 2 Unit Dial, (Toujeo Max SoloStar) 300 UNIT/ML solution pen-injector injection Inject 110 Units under the skin into the appropriate area as directed Daily for 30 days.   • insulin lispro (humaLOG) 100 UNIT/ML injection Inject 70 Units under the skin into the appropriate area as directed 3 (Three) Times a Day.   • Insulin Pen Needle (True Comfort Pro Pen Needles) 32G X 4 MM misc 1 each 2 (Two) Times a Day.   • levETIRAcetam (KEPPRA) 500 MG tablet TAKE ONE TABLET BY MOUTH TWICE A DAY   • Magnesium Oxide 400 MG capsule Take 400 mg by mouth Every Night.   • metaxalone (Skelaxin) 800 MG tablet Every 8 (Eight) Hours.   • metoclopramide (REGLAN) 10 MG tablet Take 1 tablet by mouth 4 (Four) Times a Day Before Meals & at Bedtime.   • mirtazapine (REMERON) 15 MG tablet Take 30 mg by mouth Every Night.   • Multiple Vitamins-Minerals (MULTIVITAMIN WITH MINERALS) tablet  tablet Take 1 tablet by mouth Daily.   • Narcan 4 MG/0.1ML nasal spray    • NovoLOG 100 UNIT/ML injection Inject 60 units under the skin  3 times daily  With meals   • Oxaydo 7.5 MG tablet    • polyethylene glycol (MIRALAX) powder Take 17 g by mouth Daily As Needed.   • Prasterone (Intrarosa) 6.5 MG insert Insert 1 each into the vagina 2 (Two) Times a Week.   • pregabalin (LYRICA) 75 MG capsule Take 75 mg by mouth 2 (Two) Times a Day.   • promethazine (PHENERGAN) 25 MG tablet Take 25 mg by mouth Every 8 (Eight) Hours As Needed for Nausea or Vomiting.   • riFAXIMin (Xifaxan) 550 MG tablet Take 1 tablet by mouth 2 (Two) Times a Day.   • sertraline (ZOLOFT) 50 MG tablet Take 50 mg by mouth Daily.   • spironolactone (ALDACTONE) 100 MG tablet TAKE ONE TABLET BY MOUTH DAILY (Patient taking differently: Take 100 mg by mouth Daily As Needed (swelling).)   • vitamin B-12 (CYANOCOBALAMIN) 1000 MCG tablet Take 1,000 mcg by mouth Daily.   • zinc gluconate 50 MG tablet Take 50 mg by mouth Daily.   • zolpidem (AMBIEN) 5 MG tablet 5 mg Every Night.     No current facility-administered medications on file prior to visit.     Current Medications:  Scheduled Meds:  Continuous Infusions:No current facility-administered medications for this visit.    PRN Meds:.    I have reviewed the patient's medical history in detail and updated the computerized patient record.  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   • Cirrhosis (HCC)    • Clostridium difficile infection    • Colon polyps    • Depression    • Diabetes mellitus (HCC)     Adult onset, insulin requiring   • Duodenal ulcer with hemorrhage    • Esophageal varices determined by endoscopy (HCC)    • Fibromyalgia     • Gallbladder disease    • Gastric varices    • Gastroparesis    • Glaucoma    • Granuloma annulare    • H/O B12 deficiency    • H/O Bleeding ulcer     And gastroesophageal varices   • H/O mixed connective tissue disorder    • Hemorrhoids    • Hiatal hernia    • History of transfusion     needs bendryl  flushes sensation and temp goes up   • Hx of being hospitalized     In [place] for GI bleeding from ulcer and varices   • Hyperlipidemia    • Migraine    • Mitral valve prolapse    • DUKES (nonalcoholic steatohepatitis) 11/2016   • Orthostatic hypotension 02/2019   • BRICE (obstructive sleep apnea)    • Pancreas divisum    • Pancytopenia (HCC)     Chronic, due to cirrhosis and hypersplenism   • Peptic ulcer disease     And esophageal varices   • PONV (postoperative nausea and vomiting)    • RA (rheumatoid arthritis) (HCC)    • Seizure disorder (HCC)     last 2003   • Seizures (HCC)    • Systemic lupus (HCC)    • TIA (transient ischemic attack) 1984        Past Surgical History:   Procedure Laterality Date   • BARTHOLIN CYST MARSUPIALIZATION Left 1/18/2021    Procedure: EXCISION OF LABIAL CYST;  Surgeon: Melinda Thakkar MD;  Location: [facility] MAIN OR;  Service: Obstetrics/Gynecology;  Laterality: Left;   • BLADDER REPAIR  1991   • CATARACT EXTRACTION     • CHOLECYSTECTOMY  1994   • COLONOSCOPY     • ENDOSCOPY N/A 11/7/2016    Procedure: ESOPHAGOGASTRODUODENOSCOPY WITH COLD POLYPECTOMY, BANDING,  AND CLIPS TIMES 2;  Surgeon: Rich Coleman MD;  Location: [facility] ENDOSCOPY;  Service:    • ENDOSCOPY N/A 12/22/2016    Procedure: ESOPHAGOGASTRODUODENOSCOPY WITH ESOPHAGEAL BANDING. 5 BANDS FIRED, 4 BANDS ADHERERD TO MUCOSA;  Surgeon: Rich Coleman MD;  Location: [facility] ENDOSCOPY;  Service:    • ENDOSCOPY N/A 2/15/2017    Procedure: ESOPHAGOGASTRODUODENOSCOPY AT BEDSIDE with esophageal varices banding (3);  Surgeon: Rich Coleman MD;  Location: [facility] ENDOSCOPY;  Service:    • ENDOSCOPY N/A 4/6/2017    Procedure:  "ESOPHAGOGASTRODUODENOSCOPY WITH ESOPHAGEAL VARICES BANDING x2;  Surgeon: Rich Coleman MD;  Location: [facility] ENDOSCOPY;  Service:    • ENDOSCOPY N/A 2017    Procedure: ESOPHAGOGASTRODUODENOSCOPY with biopsies;  Surgeon: Rich Coleman MD;  Location: [facility] ENDOSCOPY;  Service:    • ENDOSCOPY N/A 10/5/2018    Procedure: ESOPHAGOGASTRODUODENOSCOPY;  Surgeon: Rich Coleman MD;  Location: [facility] ENDOSCOPY;  Service: Gastroenterology   • ENDOSCOPY N/A 5/10/2019    Procedure: ESOPHAGOGASTRODUODENOSCOPY AT BEDSIDE WITH VARICEAL LIGATION;  Surgeon: Rich Coleman MD;  Location: [facility] ENDOSCOPY;  Service: Gastroenterology   • ENDOSCOPY N/A 2019    Procedure: ESOPHAGOGASTRODUODENOSCOPY WITH ESOPHAGEAL BANDING (3 BANDS);  Surgeon: Rich Coleman MD;  Location: [facility] ENDOSCOPY;  Service: Gastroenterology   • ENDOSCOPY N/A 4/3/2021    Procedure: ESOPHAGOGASTRODUODENOSCOPY WITH COLD BIOPSIES & BRUSHINGS;  Surgeon: Endy Vasquez MD;  Location: [facility] ENDOSCOPY;  Service: Gastroenterology;  Laterality: N/A;  PRE- N,V & DYSPHAGIA  POST- GASTRITIS, POSSIBLE FUNGAL ESOPHAGITIS   • ENDOSCOPY AND COLONOSCOPY      Dr. Rich Coleman with findings of candida esophagitis   • EYE SURGERY     • HYSTERECTOMY  1986    partial   • JOINT REPLACEMENT     • KNEE SURGERY Right     \"right knee recontstruction\"   • LIVER BIOPSY  2015   • MASS EXCISION     • STOMACH SURGERY     • TIPS PROCEDURE  2020    x2        Social History     Occupational History     Employer: DISABLED   Tobacco Use   • Smoking status: Former Smoker     Packs/day: 1.00     Years: 4.00     Pack years: 4.00     Types: Cigarettes     Quit date: 2015     Years since quittin.0   • Smokeless tobacco: Never Used   Vaping Use   • Vaping Use: Never used   Substance and Sexual Activity   • Alcohol use: No   • Drug use: Never   • Sexual activity: Defer      Social History     Social History Narrative    Moved to [place] from [place]. She " "is currently medically disabled.        Family History   Problem Relation Age of Onset   • Liver disease Other    • Depression Other    • Heart disease Other    • Hypertension Other    • Diabetes Other    • Breast cancer Other    • Brain cancer Other    • Uterine cancer Other    • Colon cancer Other    • Leukemia Other    • Colon cancer Maternal Aunt    • Hypertension Mother    • Diabetes type II Mother    • Rheum arthritis Mother    • Liver disease Mother         DUKES   • Heart disease Father    • Diabetes type II Father    • Diabetes type II Sister    • Lupus Sister    • Malig Hyperthermia Neg Hx        ROS: 14 point review of systems was performed and was negative except for documented findings in HPI and today's encounter.     Allergies:   Allergies   Allergen Reactions   • Albuterol Anaphylaxis   • Imitrex [Sumatriptan] Anaphylaxis   • Tramadol Nausea Only, Other (See Comments) and GI Intolerance     Per pt causes her \"palsy, meaning shaking and tremors\"    • Coconut (Cocos Nucifera) Allergy Skin Test Nausea And Vomiting   • Nsaids Other (See Comments)     Told by MD not to take due to liver problems   • Tylenol [Acetaminophen] Other (See Comments)     Has liver problems and told by MD to not take   • Zofran [Ondansetron Hcl] Other (See Comments)     Pt states does not work to correct nausea and vomiting.    • Codeine Nausea Only, Rash and GI Intolerance   • Lactulose Nausea And Vomiting and Other (See Comments)     Severe abdominal pain   • Quinine Derivatives Nausea And Vomiting     Constitutional:  Denies fever, shaking or chills   Eyes:  Denies change in visual acuity   HENT:  Denies nasal congestion or sore throat   Respiratory:  Denies cough or shortness of breath   Cardiovascular:  Denies chest pain or severe LE edema   GI:  Denies abdominal pain, nausea, vomiting, bloody stools or diarrhea   Musculoskeletal:  Numbness, tingling, or loss of motor function only as noted above in history of present " illness.  : Denies painful urination or hematuria  Integument:  Denies rash, lesion or ulceration   Neurologic:  Denies headache or focal weakness  Endocrine:  Denies lymphadenopathy  Psych:  Denies confusion or change in mental status   Hem:  Denies active bleeding      Physical Exam: 61 y.o. female  Wt Readings from Last 3 Encounters:   10/11/21 85.9 kg (189 lb 4.8 oz)   10/07/21 88.5 kg (195 lb)   10/04/21 87.1 kg (192 lb)       There is no height or weight on file to calculate BMI.  No height and weight on file for this encounter.  There were no vitals filed for this visit.  Vital signs reviewed.   General Appearance:    Alert, cooperative, in no acute distress                    Ortho exam    Physical exam of the left shoulder reveals no overlying skin changes no lymphedema no lymphadenopathy.  Patient has active flexion 10 with mild symptoms abduction is similar external rotation is to 50 and internal rotation to the lower lumbar spine with mild symptoms.  Patient has good rotator cuff strength- 4+ over 5 with isometric strength testing with pain.  Patient has a positive impingement and a positive Damian sign.  Patient has good cervical range of motion which is full and asymptomatic no radicular symptoms.  Patient has a normal elbow exam.  Good distal pulses are presentPatient has pain with overhead activity and a positive Neer sign and a positive empty can sign  They have a positive drop arm any definitive painful arc      X-rays AP scapular Y x-ray lateral left shoulder were taken to evaluate her symptoms and compared x-rays done last year.  She does have evidence of prior fracture with a flexed position of the humeral head but the heads well-seated within no change     .time    Assessment: Persistent left shoulder pain despite conservative measures we talked about other options injections but again she is, failed those I think the next step would be to proceed with an MRI    Plan:   Follow up as  indicated.  Ice, elevate, and rest as needed.  Discussed conservative measures of pain control including ice, bracing.  Also talked about the importance of strengthening and maintaining ideal body weight    Sade Roa M.D.

## 2021-12-27 ENCOUNTER — TELEPHONE (OUTPATIENT)
Dept: ENDOCRINOLOGY | Age: 61
End: 2021-12-27

## 2021-12-30 ENCOUNTER — OFFICE VISIT (OUTPATIENT)
Dept: INTERNAL MEDICINE | Age: 61
End: 2021-12-30

## 2021-12-30 VITALS
DIASTOLIC BLOOD PRESSURE: 62 MMHG | OXYGEN SATURATION: 99 % | TEMPERATURE: 97.1 F | BODY MASS INDEX: 28.63 KG/M2 | SYSTOLIC BLOOD PRESSURE: 140 MMHG | HEIGHT: 67 IN | HEART RATE: 82 BPM | WEIGHT: 182.4 LBS

## 2021-12-30 DIAGNOSIS — R07.9 CHEST PAIN, UNSPECIFIED TYPE: Primary | ICD-10-CM

## 2021-12-30 PROCEDURE — 99213 OFFICE O/P EST LOW 20 MIN: CPT | Performed by: NURSE PRACTITIONER

## 2021-12-30 NOTE — PROGRESS NOTES
"    I N T E R N A L  M E D I C I N E  SAGE MARCELINO, JOEL      ENCOUNTER DATE:  12/30/2021    Silvia Zabala / 61 y.o. / female      CHIEF COMPLAINT / REASON FOR OFFICE VISIT     Chest Pain (ER follow-up )      ASSESSMENT & PLAN     1. Chest pain, unspecified type  -Unlikely cardiac, follow-up with GI for evaluation of esophageal spasm    No orders of the defined types were placed in this encounter.    No orders of the defined types were placed in this encounter.      SUMMARY/DISCUSSION  • Follow-up as scheduled      Next Appointment with me: 6/20/2022    Return for Next scheduled follow up.      VITAL SIGNS     Visit Vitals  /62 (Cuff Size: Adult)   Pulse 82   Temp 97.1 °F (36.2 °C) (Temporal)   Ht 170.2 cm (67\")   Wt 82.7 kg (182 lb 6.4 oz)   LMP  (LMP Unknown)   SpO2 99%   BMI 28.57 kg/m²       Wt Readings from Last 3 Encounters:   12/30/21 82.7 kg (182 lb 6.4 oz)   12/20/21 83.9 kg (185 lb)   10/11/21 85.9 kg (189 lb 4.8 oz)     Body mass index is 28.57 kg/m².      MEDICATIONS AT THE TIME OF OFFICE VISIT     Current Outpatient Medications on File Prior to Visit   Medication Sig   • ALPRAZolam (XANAX) 0.5 MG tablet Take 1 tablet by mouth 2 (Two) Times a Day As Needed for Anxiety or Sleep. (Patient taking differently: Take 1 mg by mouth 2 (Two) Times a Day As Needed for Anxiety or Sleep.)   • ALPRAZolam (XANAX) 1 MG tablet Take 1 mg by mouth As Needed for Anxiety.   • azelastine (ASTELIN) 0.1 % nasal spray 2 sprays into the nostril(s) as directed by provider 2 (Two) Times a Day. Use in each nostril as directed   • Cholecalciferol (Vitamin D3) 25 MCG (1000 UT) capsule Take 1,000 Units by mouth Daily.   • cholestyramine light 4 g powder Take 1 packet by mouth 2 (Two) Times a Day. (Patient taking differently: Take 8 g by mouth 2 (Two) Times a Day.)   • Continuous Blood Gluc Sensor (AMW FoundationStyle Roseline 14 Day Sensor) misc Use as directed.   • Continuous Blood Gluc Sensor (FreeStyle Roseline Sensor System) 1 Device Every " 14 (Fourteen) Days.   • Docusate Sodium (COLACE PO) Take 2 capsules by mouth every night at bedtime.   • esomeprazole (nexIUM) 40 MG capsule TAKE ONE CAPSULE BY MOUTH EVERY MORNING BEFORE BREAKFAST   • ferrous sulfate 325 (65 FE) MG tablet TAKE ONE TABLET BY MOUTH TWICE A DAY (Patient taking differently: Take 325 mg by mouth 2 (two) times a day.)   • FLUoxetine (PROzac) 10 MG capsule Take 20 mg by mouth Daily.   • fluticasone (FLONASE) 50 MCG/ACT nasal spray 2 sprays into the nostril(s) as directed by provider Daily.   • glucose blood test strip by Other route As Needed.   • hydrOXYzine (ATARAX) 25 MG tablet Take 2 tablets by mouth Every Night.   • insulin detemir (LEVEMIR) 100 UNIT/ML injection Inject 65 Units under the skin into the appropriate area as directed 2 (Two) Times a Day.   • insulin lispro (humaLOG) 100 UNIT/ML injection Inject 70 Units under the skin into the appropriate area as directed 3 (Three) Times a Day.   • Insulin Pen Needle (True Comfort Pro Pen Needles) 32G X 4 MM misc 1 each 2 (Two) Times a Day.   • levETIRAcetam (KEPPRA) 500 MG tablet TAKE ONE TABLET BY MOUTH TWICE A DAY   • Magnesium Oxide 400 MG capsule Take 400 mg by mouth Every Night.   • metaxalone (Skelaxin) 800 MG tablet Every 8 (Eight) Hours.   • metoclopramide (REGLAN) 10 MG tablet Take 1 tablet by mouth 4 (Four) Times a Day Before Meals & at Bedtime.   • mirtazapine (REMERON) 15 MG tablet Take 30 mg by mouth Every Night.   • Multiple Vitamins-Minerals (MULTIVITAMIN WITH MINERALS) tablet tablet Take 1 tablet by mouth Daily.   • Narcan 4 MG/0.1ML nasal spray    • NovoLOG 100 UNIT/ML injection Inject 60 units under the skin  3 times daily  With meals   • Oxaydo 7.5 MG tablet 7.5 mg 2 (Two) Times a Day.   • polyethylene glycol (MIRALAX) powder Take 17 g by mouth Daily As Needed.   • Prasterone (Intrarosa) 6.5 MG insert Insert 1 each into the vagina 2 (Two) Times a Week.   • pregabalin (LYRICA) 75 MG capsule Take 75 mg by mouth 2 (Two)  Times a Day.   • promethazine (PHENERGAN) 25 MG tablet Take 25 mg by mouth Every 8 (Eight) Hours As Needed for Nausea or Vomiting.   • riFAXIMin (Xifaxan) 550 MG tablet Take 1 tablet by mouth 2 (Two) Times a Day.   • sertraline (ZOLOFT) 50 MG tablet Take 50 mg by mouth Daily.   • spironolactone (ALDACTONE) 100 MG tablet TAKE ONE TABLET BY MOUTH DAILY (Patient taking differently: Take 100 mg by mouth Daily As Needed (swelling).)   • vitamin B-12 (CYANOCOBALAMIN) 1000 MCG tablet Take 1,000 mcg by mouth Daily.   • zinc gluconate 50 MG tablet Take 50 mg by mouth Daily.   • zolpidem (AMBIEN) 5 MG tablet 5 mg Every Night.   • [DISCONTINUED] Insulin Glargine, 2 Unit Dial, (Toujeo Max SoloStar) 300 UNIT/ML solution pen-injector injection Inject 110 Units under the skin into the appropriate area as directed Daily for 30 days.     No current facility-administered medications on file prior to visit.         HISTORY OF PRESENT ILLNESS     Patient originally presented to the [facility] emergency room on November 8 for left-sided chest pain which is chronic but it worsened that day.  Worsening pain with deep breathing and associated shortness of breath.  EKG sinus tach, chest x-ray unremarkable, troponin and D-dimer negative.  Patient has had similar pain in the past related to esophageal spasm, following up with gastroenterology in the next month.  No reported pain today, intermittent.    REVIEW OF SYSTEMS     Constitutional neg except per HPI   Resp neg  CV intermittent left chest pain     PHYSICAL EXAMINATION     Physical Exam  Constitutional  No distress  Cardiovascular Rate  normal . Rhythm: regular . Heart sounds:  normal  Pulmonary/Chest  Effort normal. Breath sounds:  normal  Psychiatric  Alert. Judgment and thought content normal. Mood normal     REVIEWED DATA     Labs:         Imaging:           Medical Tests:             Summary of old records / correspondence / consultant report:           Request  outside records:           *Examiner was wearing medical surgical mask, face shield and exam gloves during the entire duration of the visit. Patient was masked the entire time.   Minimum social distance of 6 ft maintained entire visit except if physical contact was necessary as documented.     Dictated utilizing Dragon dictation

## 2022-01-07 ENCOUNTER — DOCUMENTATION (OUTPATIENT)
Dept: ENDOCRINOLOGY | Age: 62
End: 2022-01-07

## 2022-01-07 NOTE — PROGRESS NOTES
I tried calling the patient twice, and left a text message as well.  We will have to call the patient next week to reschedule.  Okay to schedule the patient with Daysi in 3 months from now and in 6 months from now with me.  As long as patient is okay with this plan

## 2022-01-10 ENCOUNTER — LAB (OUTPATIENT)
Dept: LAB | Facility: HOSPITAL | Age: 62
End: 2022-01-10

## 2022-01-10 DIAGNOSIS — K76.6 PORTAL HYPERTENSION: ICD-10-CM

## 2022-01-10 DIAGNOSIS — K31.84 GASTROPARESIS: ICD-10-CM

## 2022-01-10 DIAGNOSIS — D69.6 THROMBOCYTOPENIA: ICD-10-CM

## 2022-01-10 DIAGNOSIS — D61.818 PANCYTOPENIA: ICD-10-CM

## 2022-01-10 DIAGNOSIS — D50.9 IRON DEFICIENCY ANEMIA, UNSPECIFIED IRON DEFICIENCY ANEMIA TYPE: ICD-10-CM

## 2022-01-10 DIAGNOSIS — M32.9 SYSTEMIC LUPUS ERYTHEMATOSUS, UNSPECIFIED SLE TYPE, UNSPECIFIED ORGAN INVOLVEMENT STATUS: ICD-10-CM

## 2022-01-10 DIAGNOSIS — K75.81 NASH (NONALCOHOLIC STEATOHEPATITIS): ICD-10-CM

## 2022-01-10 DIAGNOSIS — D69.3 ACUTE ITP: ICD-10-CM

## 2022-01-10 LAB
ALBUMIN SERPL-MCNC: 3.6 G/DL (ref 3.5–5.2)
ALBUMIN/GLOB SERPL: 1.4 G/DL
ALP SERPL-CCNC: 108 U/L (ref 39–117)
ALT SERPL W P-5'-P-CCNC: 37 U/L (ref 1–33)
ANION GAP SERPL CALCULATED.3IONS-SCNC: 11.5 MMOL/L (ref 5–15)
AST SERPL-CCNC: 60 U/L (ref 1–32)
BASOPHILS # BLD AUTO: 0.02 10*3/MM3 (ref 0–0.2)
BASOPHILS NFR BLD AUTO: 0.7 % (ref 0–1.5)
BILIRUB SERPL-MCNC: 1.5 MG/DL (ref 0–1.2)
BUN SERPL-MCNC: 18 MG/DL (ref 6–20)
BUN/CREAT SERPL: 18.8 (ref 7–25)
CALCIUM SPEC-SCNC: 7.8 MG/DL (ref 8.6–10.5)
CHLORIDE SERPL-SCNC: 105 MMOL/L (ref 98–107)
CO2 SERPL-SCNC: 22.5 MMOL/L (ref 22–29)
CREAT SERPL-MCNC: 0.96 MG/DL (ref 0.57–1)
DEPRECATED RDW RBC AUTO: 48.3 FL (ref 37–54)
EOSINOPHIL # BLD AUTO: 0.02 10*3/MM3 (ref 0–0.4)
EOSINOPHIL NFR BLD AUTO: 0.7 % (ref 0.3–6.2)
ERYTHROCYTE [DISTWIDTH] IN BLOOD BY AUTOMATED COUNT: 14.9 % (ref 12.3–15.4)
FERRITIN SERPL-MCNC: 80.3 NG/ML (ref 13–150)
GFR SERPL CREATININE-BSD FRML MDRD: 59 ML/MIN/1.73
GLOBULIN UR ELPH-MCNC: 2.5 GM/DL
GLUCOSE SERPL-MCNC: 285 MG/DL (ref 65–99)
HCT VFR BLD AUTO: 33 % (ref 34–46.6)
HGB BLD-MCNC: 11.6 G/DL (ref 12–15.9)
IMM GRANULOCYTES # BLD AUTO: 0.05 10*3/MM3 (ref 0–0.05)
IMM GRANULOCYTES NFR BLD AUTO: 1.6 % (ref 0–0.5)
IRON 24H UR-MRATE: 76 MCG/DL (ref 37–145)
IRON SATN MFR SERPL: 18 % (ref 20–50)
LYMPHOCYTES # BLD AUTO: 0.49 10*3/MM3 (ref 0.7–3.1)
LYMPHOCYTES NFR BLD AUTO: 16 % (ref 19.6–45.3)
MCH RBC QN AUTO: 31.1 PG (ref 26.6–33)
MCHC RBC AUTO-ENTMCNC: 35.2 G/DL (ref 31.5–35.7)
MCV RBC AUTO: 88.5 FL (ref 79–97)
MONOCYTES # BLD AUTO: 0.27 10*3/MM3 (ref 0.1–0.9)
MONOCYTES NFR BLD AUTO: 8.8 % (ref 5–12)
NEUTROPHILS NFR BLD AUTO: 2.22 10*3/MM3 (ref 1.7–7)
NEUTROPHILS NFR BLD AUTO: 72.2 % (ref 42.7–76)
NRBC BLD AUTO-RTO: 0 /100 WBC (ref 0–0.2)
PLATELET # BLD AUTO: 63 10*3/MM3 (ref 140–450)
PMV BLD AUTO: 9.7 FL (ref 6–12)
POTASSIUM SERPL-SCNC: 4.6 MMOL/L (ref 3.5–5.2)
PROT SERPL-MCNC: 6.1 G/DL (ref 6–8.5)
RBC # BLD AUTO: 3.73 10*6/MM3 (ref 3.77–5.28)
SODIUM SERPL-SCNC: 139 MMOL/L (ref 136–145)
TIBC SERPL-MCNC: 413 MCG/DL (ref 298–536)
TRANSFERRIN SERPL-MCNC: 277 MG/DL (ref 200–360)
VIT B12 BLD-MCNC: >2000 PG/ML (ref 211–946)
WBC NRBC COR # BLD: 3.07 10*3/MM3 (ref 3.4–10.8)

## 2022-01-10 PROCEDURE — 80053 COMPREHEN METABOLIC PANEL: CPT | Performed by: INTERNAL MEDICINE

## 2022-01-10 PROCEDURE — 82607 VITAMIN B-12: CPT | Performed by: INTERNAL MEDICINE

## 2022-01-10 PROCEDURE — 36415 COLL VENOUS BLD VENIPUNCTURE: CPT

## 2022-01-10 PROCEDURE — 82728 ASSAY OF FERRITIN: CPT | Performed by: INTERNAL MEDICINE

## 2022-01-10 PROCEDURE — 85025 COMPLETE CBC W/AUTO DIFF WBC: CPT

## 2022-01-10 PROCEDURE — 84466 ASSAY OF TRANSFERRIN: CPT | Performed by: INTERNAL MEDICINE

## 2022-01-10 PROCEDURE — 83540 ASSAY OF IRON: CPT | Performed by: INTERNAL MEDICINE

## 2022-01-28 ENCOUNTER — TELEPHONE (OUTPATIENT)
Dept: INTERNAL MEDICINE | Age: 62
End: 2022-01-28

## 2022-01-28 RX ORDER — AMOXICILLIN 875 MG/1
875 TABLET, COATED ORAL 2 TIMES DAILY
Qty: 20 TABLET | Refills: 0 | Status: SHIPPED | OUTPATIENT
Start: 2022-01-28 | End: 2022-02-08

## 2022-01-28 NOTE — TELEPHONE ENCOUNTER
Send amoxicillin 875 twice daily for 10 days.  Patient instructed to go to emergency or urgent care if no better

## 2022-01-28 NOTE — TELEPHONE ENCOUNTER
Caller: Silvia Zabala    Relationship: Self    Best call back number: [phone]    What medication are you requesting: ANTIBIOTICS     What are your current symptoms: CONGESTION, YELLOWISH/BLOODY MUCUS, LOW GRADE FEVER, SORE THROAT, NOT COUGHING ANYMORE,     How long have you been experiencing symptoms: 3 WEEKS    Have you had these symptoms before:    [] Yes  [x] No    Have you been treated for these symptoms before:   [] Yes  [x] No    If a prescription is needed, what is your preferred pharmacy and phone number: [facility] [address] [facility] - [phone]  - [phone] FX     Additional notes:RAFFAELE THIS MAY BE A SINUS INFECTION, LIMITED TO WHAT SHE CAN TAKE WITH HER LIVER

## 2022-01-28 NOTE — TELEPHONE ENCOUNTER
Pt was offered face to face appt date/times but unable to commit r/t  appts. Pt unable to do MYCHART video r/t scheduling. Pt was advised to go to  for eval. She again stated she is not able to drive and  is sick r/t heart.  She requested this be sent to PCP in hopes she will give ABX. KHOA

## 2022-01-31 RX ORDER — LEVETIRACETAM 500 MG/1
TABLET ORAL
Qty: 180 TABLET | Refills: 1 | Status: SHIPPED | OUTPATIENT
Start: 2022-01-31 | End: 2022-07-27

## 2022-02-07 RX ORDER — INSULIN ASPART 100 [IU]/ML
INJECTION, SOLUTION INTRAVENOUS; SUBCUTANEOUS
Qty: 45 ML | Refills: 0 | Status: SHIPPED | OUTPATIENT
Start: 2022-02-07 | End: 2022-02-16 | Stop reason: SDUPTHER

## 2022-02-08 ENCOUNTER — APPOINTMENT (OUTPATIENT)
Dept: GENERAL RADIOLOGY | Facility: HOSPITAL | Age: 62
End: 2022-02-08

## 2022-02-08 ENCOUNTER — HOSPITAL ENCOUNTER (OUTPATIENT)
Facility: HOSPITAL | Age: 62
Setting detail: OBSERVATION
Discharge: HOME | End: 2022-02-10
Attending: EMERGENCY MEDICINE | Admitting: HOSPITALIST

## 2022-02-08 DIAGNOSIS — I45.10 NEW ONSET RIGHT BUNDLE BRANCH BLOCK (RBBB): ICD-10-CM

## 2022-02-08 DIAGNOSIS — E03.9 HYPOTHYROIDISM, UNSPECIFIED TYPE: ICD-10-CM

## 2022-02-08 DIAGNOSIS — N39.0 ACUTE UTI: ICD-10-CM

## 2022-02-08 DIAGNOSIS — R55 NEAR SYNCOPE: Primary | ICD-10-CM

## 2022-02-08 DIAGNOSIS — Z79.899 POLYPHARMACY: ICD-10-CM

## 2022-02-08 LAB
ALBUMIN SERPL-MCNC: 3.9 G/DL (ref 3.5–5.2)
ALBUMIN/GLOB SERPL: 1.5 G/DL
ALP SERPL-CCNC: 118 U/L (ref 39–117)
ALT SERPL W P-5'-P-CCNC: 37 U/L (ref 1–33)
AMPHET+METHAMPHET UR QL: NEGATIVE
ANION GAP SERPL CALCULATED.3IONS-SCNC: 10.6 MMOL/L (ref 5–15)
AST SERPL-CCNC: 35 U/L (ref 1–32)
BACTERIA UR QL AUTO: ABNORMAL /HPF
BARBITURATES UR QL SCN: NEGATIVE
BASOPHILS # BLD AUTO: 0.03 10*3/MM3 (ref 0–0.2)
BASOPHILS NFR BLD AUTO: 0.5 % (ref 0–1.5)
BENZODIAZ UR QL SCN: POSITIVE
BILIRUB SERPL-MCNC: 1.4 MG/DL (ref 0–1.2)
BILIRUB UR QL STRIP: NEGATIVE
BUN SERPL-MCNC: 20 MG/DL (ref 6–20)
BUN/CREAT SERPL: 17.1 (ref 7–25)
CALCIUM SPEC-SCNC: 8.9 MG/DL (ref 8.6–10.5)
CANNABINOIDS SERPL QL: NEGATIVE
CHLORIDE SERPL-SCNC: 103 MMOL/L (ref 98–107)
CLARITY UR: CLEAR
CO2 SERPL-SCNC: 22.4 MMOL/L (ref 22–29)
COCAINE UR QL: NEGATIVE
COLOR UR: ABNORMAL
CREAT SERPL-MCNC: 1.17 MG/DL (ref 0.57–1)
DEPRECATED RDW RBC AUTO: 46.3 FL (ref 37–54)
EOSINOPHIL # BLD AUTO: 0.16 10*3/MM3 (ref 0–0.4)
EOSINOPHIL NFR BLD AUTO: 2.8 % (ref 0.3–6.2)
ERYTHROCYTE [DISTWIDTH] IN BLOOD BY AUTOMATED COUNT: 14.5 % (ref 12.3–15.4)
GFR SERPL CREATININE-BSD FRML MDRD: 47 ML/MIN/1.73
GLOBULIN UR ELPH-MCNC: 2.6 GM/DL
GLUCOSE BLDC GLUCOMTR-MCNC: 112 MG/DL (ref 70–130)
GLUCOSE BLDC GLUCOMTR-MCNC: 356 MG/DL (ref 70–130)
GLUCOSE BLDC GLUCOMTR-MCNC: 388 MG/DL (ref 70–130)
GLUCOSE SERPL-MCNC: 168 MG/DL (ref 65–99)
GLUCOSE UR STRIP-MCNC: NEGATIVE MG/DL
HCT VFR BLD AUTO: 36.6 % (ref 34–46.6)
HGB BLD-MCNC: 12.8 G/DL (ref 12–15.9)
HGB UR QL STRIP.AUTO: NEGATIVE
HYALINE CASTS UR QL AUTO: ABNORMAL /LPF
IMM GRANULOCYTES # BLD AUTO: 0.02 10*3/MM3 (ref 0–0.05)
IMM GRANULOCYTES NFR BLD AUTO: 0.3 % (ref 0–0.5)
KETONES UR QL STRIP: ABNORMAL
LEUKOCYTE ESTERASE UR QL STRIP.AUTO: ABNORMAL
LIPASE SERPL-CCNC: 34 U/L (ref 13–60)
LYMPHOCYTES # BLD AUTO: 1.46 10*3/MM3 (ref 0.7–3.1)
LYMPHOCYTES NFR BLD AUTO: 25.3 % (ref 19.6–45.3)
MAGNESIUM SERPL-MCNC: 1.8 MG/DL (ref 1.6–2.6)
MCH RBC QN AUTO: 30.3 PG (ref 26.6–33)
MCHC RBC AUTO-ENTMCNC: 35 G/DL (ref 31.5–35.7)
MCV RBC AUTO: 86.7 FL (ref 79–97)
METHADONE UR QL SCN: NEGATIVE
MONOCYTES # BLD AUTO: 0.59 10*3/MM3 (ref 0.1–0.9)
MONOCYTES NFR BLD AUTO: 10.2 % (ref 5–12)
NEUTROPHILS NFR BLD AUTO: 3.5 10*3/MM3 (ref 1.7–7)
NEUTROPHILS NFR BLD AUTO: 60.9 % (ref 42.7–76)
NITRITE UR QL STRIP: NEGATIVE
NRBC BLD AUTO-RTO: 0 /100 WBC (ref 0–0.2)
NT-PROBNP SERPL-MCNC: 206 PG/ML (ref 0–900)
OPIATES UR QL: NEGATIVE
OXYCODONE UR QL SCN: NEGATIVE
PH UR STRIP.AUTO: <=5 [PH] (ref 5–8)
PLATELET # BLD AUTO: 95 10*3/MM3 (ref 140–450)
PMV BLD AUTO: 10.1 FL (ref 6–12)
POTASSIUM SERPL-SCNC: 3.4 MMOL/L (ref 3.5–5.2)
PROT SERPL-MCNC: 6.5 G/DL (ref 6–8.5)
PROT UR QL STRIP: ABNORMAL
QT INTERVAL: 447 MS
RBC # BLD AUTO: 4.22 10*6/MM3 (ref 3.77–5.28)
RBC # UR STRIP: ABNORMAL /HPF
REF LAB TEST METHOD: ABNORMAL
SARS-COV-2 RNA PNL SPEC NAA+PROBE: NOT DETECTED
SODIUM SERPL-SCNC: 136 MMOL/L (ref 136–145)
SP GR UR STRIP: 1.02 (ref 1–1.03)
SQUAMOUS #/AREA URNS HPF: ABNORMAL /HPF
T3FREE SERPL-MCNC: 3.02 PG/ML (ref 2–4.4)
T4 FREE SERPL-MCNC: 1.1 NG/DL (ref 0.93–1.7)
TROPONIN T SERPL-MCNC: <0.01 NG/ML (ref 0–0.03)
TROPONIN T SERPL-MCNC: <0.01 NG/ML (ref 0–0.03)
TSH SERPL DL<=0.05 MIU/L-ACNC: 13.3 UIU/ML (ref 0.27–4.2)
UROBILINOGEN UR QL STRIP: ABNORMAL
WBC # UR STRIP: ABNORMAL /HPF
WBC NRBC COR # BLD: 5.76 10*3/MM3 (ref 3.4–10.8)

## 2022-02-08 PROCEDURE — 83880 ASSAY OF NATRIURETIC PEPTIDE: CPT | Performed by: EMERGENCY MEDICINE

## 2022-02-08 PROCEDURE — 87635 SARS-COV-2 COVID-19 AMP PRB: CPT | Performed by: EMERGENCY MEDICINE

## 2022-02-08 PROCEDURE — 80307 DRUG TEST PRSMV CHEM ANLYZR: CPT | Performed by: EMERGENCY MEDICINE

## 2022-02-08 PROCEDURE — 96361 HYDRATE IV INFUSION ADD-ON: CPT

## 2022-02-08 PROCEDURE — 83735 ASSAY OF MAGNESIUM: CPT | Performed by: EMERGENCY MEDICINE

## 2022-02-08 PROCEDURE — 99284 EMERGENCY DEPT VISIT MOD MDM: CPT

## 2022-02-08 PROCEDURE — G0378 HOSPITAL OBSERVATION PER HR: HCPCS

## 2022-02-08 PROCEDURE — 84484 ASSAY OF TROPONIN QUANT: CPT | Performed by: EMERGENCY MEDICINE

## 2022-02-08 PROCEDURE — 80053 COMPREHEN METABOLIC PANEL: CPT | Performed by: EMERGENCY MEDICINE

## 2022-02-08 PROCEDURE — 87086 URINE CULTURE/COLONY COUNT: CPT | Performed by: EMERGENCY MEDICINE

## 2022-02-08 PROCEDURE — 99285 EMERGENCY DEPT VISIT HI MDM: CPT

## 2022-02-08 PROCEDURE — 82962 GLUCOSE BLOOD TEST: CPT

## 2022-02-08 PROCEDURE — 85025 COMPLETE CBC W/AUTO DIFF WBC: CPT | Performed by: EMERGENCY MEDICINE

## 2022-02-08 PROCEDURE — 84443 ASSAY THYROID STIM HORMONE: CPT | Performed by: EMERGENCY MEDICINE

## 2022-02-08 PROCEDURE — 93010 ELECTROCARDIOGRAM REPORT: CPT | Performed by: INTERNAL MEDICINE

## 2022-02-08 PROCEDURE — 25010000002 CEFTRIAXONE PER 250 MG: Performed by: EMERGENCY MEDICINE

## 2022-02-08 PROCEDURE — 96365 THER/PROPH/DIAG IV INF INIT: CPT

## 2022-02-08 PROCEDURE — 84481 FREE ASSAY (FT-3): CPT | Performed by: EMERGENCY MEDICINE

## 2022-02-08 PROCEDURE — 71045 X-RAY EXAM CHEST 1 VIEW: CPT

## 2022-02-08 PROCEDURE — 25010000002 ENOXAPARIN PER 10 MG: Performed by: INTERNAL MEDICINE

## 2022-02-08 PROCEDURE — 84439 ASSAY OF FREE THYROXINE: CPT | Performed by: EMERGENCY MEDICINE

## 2022-02-08 PROCEDURE — 81001 URINALYSIS AUTO W/SCOPE: CPT | Performed by: EMERGENCY MEDICINE

## 2022-02-08 PROCEDURE — 83690 ASSAY OF LIPASE: CPT | Performed by: EMERGENCY MEDICINE

## 2022-02-08 PROCEDURE — 93005 ELECTROCARDIOGRAM TRACING: CPT | Performed by: EMERGENCY MEDICINE

## 2022-02-08 RX ORDER — ACETAMINOPHEN 325 MG/1
650 TABLET ORAL EVERY 4 HOURS PRN
Status: DISCONTINUED | OUTPATIENT
Start: 2022-02-08 | End: 2022-02-10 | Stop reason: HOSPADM

## 2022-02-08 RX ORDER — MELATONIN
1000 DAILY
Status: DISCONTINUED | OUTPATIENT
Start: 2022-02-08 | End: 2022-02-10 | Stop reason: HOSPADM

## 2022-02-08 RX ORDER — FLUOXETINE HYDROCHLORIDE 20 MG/1
20 CAPSULE ORAL DAILY
Status: DISCONTINUED | OUTPATIENT
Start: 2022-02-08 | End: 2022-02-10 | Stop reason: HOSPADM

## 2022-02-08 RX ORDER — PREGABALIN 75 MG/1
75 CAPSULE ORAL EVERY 12 HOURS SCHEDULED
Status: DISCONTINUED | OUTPATIENT
Start: 2022-02-08 | End: 2022-02-10 | Stop reason: HOSPADM

## 2022-02-08 RX ORDER — INSULIN LISPRO 100 [IU]/ML
60 INJECTION, SOLUTION INTRAVENOUS; SUBCUTANEOUS
Status: DISCONTINUED | OUTPATIENT
Start: 2022-02-09 | End: 2022-02-10 | Stop reason: HOSPADM

## 2022-02-08 RX ORDER — METAXALONE 800 MG/1
800 TABLET ORAL 3 TIMES DAILY
Status: DISCONTINUED | OUTPATIENT
Start: 2022-02-08 | End: 2022-02-10 | Stop reason: HOSPADM

## 2022-02-08 RX ORDER — ZOLPIDEM TARTRATE 5 MG/1
5 TABLET ORAL NIGHTLY
Status: DISCONTINUED | OUTPATIENT
Start: 2022-02-08 | End: 2022-02-10 | Stop reason: HOSPADM

## 2022-02-08 RX ORDER — NITROGLYCERIN 0.4 MG/1
0.4 TABLET SUBLINGUAL
Status: DISCONTINUED | OUTPATIENT
Start: 2022-02-08 | End: 2022-02-10 | Stop reason: HOSPADM

## 2022-02-08 RX ORDER — SCOLOPAMINE TRANSDERMAL SYSTEM 1 MG/1
1 PATCH, EXTENDED RELEASE TRANSDERMAL
COMMUNITY
Start: 2022-01-13 | End: 2022-02-12

## 2022-02-08 RX ORDER — OXYCODONE HYDROCHLORIDE 5 MG/1
5 TABLET ORAL EVERY 6 HOURS PRN
Status: DISCONTINUED | OUTPATIENT
Start: 2022-02-08 | End: 2022-02-10 | Stop reason: HOSPADM

## 2022-02-08 RX ORDER — ALPRAZOLAM 0.5 MG/1
0.5 TABLET ORAL 2 TIMES DAILY PRN
Status: DISCONTINUED | OUTPATIENT
Start: 2022-02-08 | End: 2022-02-10 | Stop reason: HOSPADM

## 2022-02-08 RX ORDER — SODIUM CHLORIDE 9 MG/ML
125 INJECTION, SOLUTION INTRAVENOUS CONTINUOUS
Status: DISCONTINUED | OUTPATIENT
Start: 2022-02-08 | End: 2022-02-09

## 2022-02-08 RX ORDER — DEXTROSE MONOHYDRATE 25 G/50ML
25 INJECTION, SOLUTION INTRAVENOUS
Status: DISCONTINUED | OUTPATIENT
Start: 2022-02-08 | End: 2022-02-10 | Stop reason: HOSPADM

## 2022-02-08 RX ORDER — NICOTINE POLACRILEX 4 MG
15 LOZENGE BUCCAL
Status: DISCONTINUED | OUTPATIENT
Start: 2022-02-08 | End: 2022-02-10 | Stop reason: HOSPADM

## 2022-02-08 RX ORDER — ONDANSETRON 4 MG/1
4 TABLET, FILM COATED ORAL EVERY 6 HOURS PRN
Status: DISCONTINUED | OUTPATIENT
Start: 2022-02-08 | End: 2022-02-10 | Stop reason: HOSPADM

## 2022-02-08 RX ORDER — SODIUM CHLORIDE 0.9 % (FLUSH) 0.9 %
10 SYRINGE (ML) INJECTION AS NEEDED
Status: DISCONTINUED | OUTPATIENT
Start: 2022-02-08 | End: 2022-02-09

## 2022-02-08 RX ORDER — LEVOTHYROXINE SODIUM 0.03 MG/1
25 TABLET ORAL
Status: DISCONTINUED | OUTPATIENT
Start: 2022-02-09 | End: 2022-02-09

## 2022-02-08 RX ORDER — METOCLOPRAMIDE 10 MG/1
10 TABLET ORAL
Status: DISCONTINUED | OUTPATIENT
Start: 2022-02-08 | End: 2022-02-10 | Stop reason: HOSPADM

## 2022-02-08 RX ORDER — UREA 10 %
3 LOTION (ML) TOPICAL NIGHTLY PRN
Status: DISCONTINUED | OUTPATIENT
Start: 2022-02-08 | End: 2022-02-10 | Stop reason: HOSPADM

## 2022-02-08 RX ORDER — SCOLOPAMINE TRANSDERMAL SYSTEM 1 MG/1
1 PATCH, EXTENDED RELEASE TRANSDERMAL ONCE
Status: DISCONTINUED | OUTPATIENT
Start: 2022-02-08 | End: 2022-02-10 | Stop reason: HOSPADM

## 2022-02-08 RX ORDER — HYDROXYZINE 50 MG/1
50 TABLET, FILM COATED ORAL 2 TIMES DAILY
Status: DISCONTINUED | OUTPATIENT
Start: 2022-02-08 | End: 2022-02-10 | Stop reason: HOSPADM

## 2022-02-08 RX ORDER — MULTIPLE VITAMINS W/ MINERALS TAB 9MG-400MCG
1 TAB ORAL DAILY
Status: DISCONTINUED | OUTPATIENT
Start: 2022-02-08 | End: 2022-02-10 | Stop reason: HOSPADM

## 2022-02-08 RX ORDER — ONDANSETRON 2 MG/ML
4 INJECTION INTRAMUSCULAR; INTRAVENOUS EVERY 6 HOURS PRN
Status: DISCONTINUED | OUTPATIENT
Start: 2022-02-08 | End: 2022-02-10 | Stop reason: HOSPADM

## 2022-02-08 RX ORDER — LEVETIRACETAM 500 MG/1
500 TABLET ORAL 2 TIMES DAILY
Status: DISCONTINUED | OUTPATIENT
Start: 2022-02-08 | End: 2022-02-10 | Stop reason: HOSPADM

## 2022-02-08 RX ORDER — ZINC SULFATE 50(220)MG
220 CAPSULE ORAL DAILY
Status: DISCONTINUED | OUTPATIENT
Start: 2022-02-08 | End: 2022-02-10 | Stop reason: HOSPADM

## 2022-02-08 RX ORDER — FERROUS SULFATE 325(65) MG
325 TABLET ORAL
Status: DISCONTINUED | OUTPATIENT
Start: 2022-02-09 | End: 2022-02-10 | Stop reason: HOSPADM

## 2022-02-08 RX ORDER — CHOLECALCIFEROL (VITAMIN D3) 125 MCG
1000 CAPSULE ORAL DAILY
Status: DISCONTINUED | OUTPATIENT
Start: 2022-02-08 | End: 2022-02-10 | Stop reason: HOSPADM

## 2022-02-08 RX ORDER — INSULIN LISPRO 100 [IU]/ML
0-9 INJECTION, SOLUTION INTRAVENOUS; SUBCUTANEOUS
Status: DISCONTINUED | OUTPATIENT
Start: 2022-02-09 | End: 2022-02-10 | Stop reason: HOSPADM

## 2022-02-08 RX ADMIN — SODIUM CHLORIDE 125 ML/HR: 9 INJECTION, SOLUTION INTRAVENOUS at 10:39

## 2022-02-08 RX ADMIN — SODIUM CHLORIDE 500 ML: 9 INJECTION, SOLUTION INTRAVENOUS at 10:39

## 2022-02-08 RX ADMIN — CEFTRIAXONE 1 G: 1 INJECTION, POWDER, FOR SOLUTION INTRAMUSCULAR; INTRAVENOUS at 14:29

## 2022-02-08 RX ADMIN — METOCLOPRAMIDE 10 MG: 10 TABLET ORAL at 20:37

## 2022-02-08 RX ADMIN — HYDROXYZINE HYDROCHLORIDE 50 MG: 50 TABLET ORAL at 20:37

## 2022-02-08 RX ADMIN — SODIUM CHLORIDE 125 ML/HR: 9 INJECTION, SOLUTION INTRAVENOUS at 13:23

## 2022-02-08 RX ADMIN — PREGABALIN 75 MG: 75 CAPSULE ORAL at 20:37

## 2022-02-08 RX ADMIN — ZOLPIDEM TARTRATE 5 MG: 5 TABLET ORAL at 21:54

## 2022-02-08 RX ADMIN — MAGNESIUM OXIDE 400 MG (241.3 MG MAGNESIUM) TABLET 400 MG: TABLET at 20:37

## 2022-02-08 RX ADMIN — Medication 220 MG: at 20:37

## 2022-02-08 RX ADMIN — MULTIPLE VITAMINS W/ MINERALS TAB 1 TABLET: TAB at 20:37

## 2022-02-08 RX ADMIN — RIFAXIMIN 550 MG: 550 TABLET ORAL at 20:37

## 2022-02-08 RX ADMIN — LEVETIRACETAM 500 MG: 500 TABLET, FILM COATED ORAL at 20:37

## 2022-02-08 RX ADMIN — METAXALONE 800 MG: 800 TABLET ORAL at 20:37

## 2022-02-08 RX ADMIN — Medication 1000 MCG: at 20:37

## 2022-02-08 RX ADMIN — SCOPALAMINE 1 PATCH: 1 PATCH, EXTENDED RELEASE TRANSDERMAL at 12:10

## 2022-02-08 RX ADMIN — SODIUM CHLORIDE 125 ML/HR: 9 INJECTION, SOLUTION INTRAVENOUS at 18:07

## 2022-02-08 NOTE — ED NOTES
Pt states she was sitting down when she became sick and faint. Did not have a syncope episode but did vomit some. States she has DUKES and has these episodes before. This rn wearing mask and goggles. Pt wearing mask during encounter.        Siobhan Jaeger, SRIKANTH  02/08/22 [id number]

## 2022-02-08 NOTE — PROGRESS NOTES
Clinical Pharmacy Services: Medication History    Silvia Zabala is a 61 y.o. female presenting to [facility] for   Chief Complaint   Patient presents with   • Syncope       She  has a past medical history of Anemia, Anxiety, Arthritis, Broken shoulder, Chromosomal abnormality, Cirrhosis (HCC), Clostridium difficile infection, Colon polyps, Depression, Diabetes mellitus (HCC), Duodenal ulcer with hemorrhage, Esophageal varices determined by endoscopy ([facility]), Fibromyalgia, Gallbladder disease, Gastric varices, Gastroparesis, Glaucoma, Granuloma annulare, H/O B12 deficiency, H/O Bleeding ulcer, H/O mixed connective tissue disorder, Hemorrhoids, Hiatal hernia, History of transfusion, being hospitalized, Hyperlipidemia, Migraine, Mitral valve prolapse, DUKES (nonalcoholic steatohepatitis) (11/2016), Orthostatic hypotension (02/2019), BRICE (obstructive sleep apnea), Pancreas divisum, Pancytopenia ([facility]), Peptic ulcer disease, PONV (postoperative nausea and vomiting), RA (rheumatoid arthritis) ([facility]), Seizure disorder ([facility]), Seizures ([facility]), Systemic lupus ([facility]), and TIA (transient ischemic attack) (1984).    Allergies as of 02/08/2022 - Reviewed 02/08/2022   Allergen Reaction Noted   • Albuterol Anaphylaxis 02/22/2016   • Imitrex [sumatriptan] Anaphylaxis 12/10/2020   • Tramadol Nausea Only, Other (See Comments), and GI Intolerance 03/08/2016   • Coconut (cocos nucifera) allergy skin test Nausea And Vomiting 09/10/2018   • Nsaids Other (See Comments) 01/15/2021   • Tylenol [acetaminophen] Other (See Comments) 01/15/2021   • Zofran [ondansetron hcl] Other (See Comments) 01/18/2021   • Codeine Nausea Only, Rash, and GI Intolerance 04/22/2021   • Lactulose Nausea And Vomiting and Other (See Comments) 03/20/2018   • Quinine derivatives Nausea And Vomiting 02/22/2016       Medication information was obtained from: Patient   Pharmacy and Phone Number:    Prior to Admission Medications     Prescriptions Last Dose  Informant Patient Reported? Taking?    ALPRAZolam (XANAX) 1 MG tablet 2/7/2022 Self Yes Yes    Take 0.5-1 mg by mouth As Needed for Anxiety.    Cholecalciferol (Vitamin D3) 25 MCG (1000 UT) capsule 2/7/2022 Self Yes Yes    Take 1,000 Units by mouth Daily.    Docusate Sodium (COLACE PO) 2/7/2022 Self Yes Yes    Take 2 capsules by mouth every night at bedtime.    ferrous sulfate 325 (65 FE) MG tablet 2/8/2022 Self No Yes    TAKE ONE TABLET BY MOUTH TWICE A DAY    Patient taking differently:  Take 325 mg by mouth Daily With Breakfast.    FLUoxetine (PROzac) 10 MG capsule 2/7/2022 Self Yes Yes    Take 20 mg by mouth Daily.    hydrOXYzine (ATARAX) 25 MG tablet 2/8/2022 Self No Yes    Take 2 tablets by mouth Every Night.    Patient taking differently:  Take 50 mg by mouth 2 (Two) Times a Day.    insulin detemir (LEVEMIR) 100 UNIT/ML injection 2/8/2022 Self Yes Yes    Inject 65 Units under the skin into the appropriate area as directed 2 (Two) Times a Day.    levETIRAcetam (KEPPRA) 500 MG tablet 2/8/2022 Self No Yes    TAKE ONE TABLET BY MOUTH TWICE A DAY    Patient taking differently:  Take 500 mg by mouth 2 (Two) Times a Day.    Magnesium Oxide 400 MG capsule 2/7/2022 Self No Yes    Take 400 mg by mouth Every Night.    metaxalone (Skelaxin) 800 MG tablet 2/8/2022 Self Yes Yes    Take 800 mg by mouth 3 (Three) Times a Day.    metoclopramide (REGLAN) 10 MG tablet 2/8/2022 Self No Yes    Take 1 tablet by mouth 4 (Four) Times a Day Before Meals & at Bedtime.    Multiple Vitamins-Minerals (MULTIVITAMIN WITH MINERALS) tablet tablet 2/7/2022 Self Yes Yes    Take 1 tablet by mouth Daily.    Narcan 4 MG/0.1ML nasal spray  Self Yes Yes    1 spray into the nostril(s) as directed by provider As Needed.    NovoLOG FlexPen 100 UNIT/ML solution pen-injector sc pen 2/7/2022 Self No Yes    INJECT 60 UNITS THREE TIMES A DAY WITH MELS    Patient taking differently:  Inject 60 Units under the skin into the appropriate area as directed 3  (Three) Times a Day With Meals.    Oxaydo 7.5 MG tablet Past Month Self Yes Yes    Take 7.5 mg by mouth 2 (Two) Times a Day.    polyethylene glycol (MIRALAX) powder Past Month Self Yes Yes    Take 17 g by mouth Daily As Needed.    pregabalin (LYRICA) 75 MG capsule 2/8/2022 Self Yes Yes    Take 75 mg by mouth 2 (Two) Times a Day.    promethazine (PHENERGAN) 25 MG tablet Past Month Self Yes Yes    Take 25 mg by mouth Every 8 (Eight) Hours As Needed for Nausea or Vomiting.    riFAXIMin (Xifaxan) 550 MG tablet 2/8/2022 Self No Yes    Take 1 tablet by mouth 2 (Two) Times a Day.    Scopolamine 1 MG/3DAYS patch 2/7/2022 Self Yes Yes    Place 1 patch on the skin as directed by provider Every 3 (Three) Days.    vitamin B-12 (CYANOCOBALAMIN) 1000 MCG tablet 2/7/2022 Self Yes Yes    Take 1,000 mcg by mouth Daily.    zinc gluconate 50 MG tablet 2/7/2022 Self Yes Yes    Take 50 mg by mouth Daily.    zolpidem (AMBIEN) 5 MG tablet 2/7/2022 Self Yes Yes    Take 5 mg by mouth Every Night.    sertraline (ZOLOFT) 50 MG tablet More than a month Self Yes No    Take 50 mg by mouth Daily.    spironolactone (ALDACTONE) 100 MG tablet More than a month Self No No    TAKE ONE TABLET BY MOUTH DAILY    Patient taking differently:  Take 100 mg by mouth Daily As Needed (swelling). For swelling            Medication notes: Patient states she takes phenergan as needed but has been using scopolamine patches more recently.     This medication list is complete to the best of my knowledge as of 2/8/2022    Please call if questions.    Joshua Gregorio  Medication History Technician  [phone]    2/8/2022 15:20 EST

## 2022-02-08 NOTE — ED TRIAGE NOTES
"Pt was in surgery waiting room waiting on  and felt like she was going to faint.  She has nausea.  Vomited \"a little bit\"    All appropriate PPE worn during entire encounter with patient.     "

## 2022-02-08 NOTE — ED PROVIDER NOTES
" EMERGENCY DEPARTMENT ENCOUNTER    Room Number:  38/38  Date of encounter:  2/8/2022  PCP: Kulwant Martínez APRN  Historian: Pt    Patient was placed in face mask during triage process. Patient was wearing facemask when I entered the room and throughout our encounter. I wore full protective equipment throughout this patient encounter including a face mask, eye protection, and gloves. Hand hygiene was performed before donning protective equipment and again following doffing of PPE after leaving the room.    HPI:  Chief Complaint: \" I feel like I am going to pass out\"  A complete HPI/ROS/PMH/PSH/SH/FH are unobtainable due to: N/A   Context: Silvia Zabala is a 61 y.o. female who presents to the ED for evaluation after near syncopal episode while waiting in the OR waiting area.  Patient reports that she has not felt her usual self for about 3 days with increased generalized weakness and malaise, diminished appetite and slight worsening of her chronic nausea.  Patient reports slight worsening of her chronic dyspnea today without chest discomfort.  She has chronic abdominal pain is unchanged from her baseline.  She occasionally has diarrhea but this is nonbloody today.  She denies any dysuria.  She denies any headache, focal numbness or weakness.  She reports that she feels like she is going to pass out but does not really describe any spinning sensation.  No clear exacerbating relieving factors identified.      MEDICAL HISTORY REVIEW  EMR reviewed.  Significant polypharmacy with multiple sedating medications noted.    PAST MEDICAL HISTORY  Active Ambulatory Problems     Diagnosis Date Noted   • Cirrhosis of liver (HCC) 03/08/2016   • Rheumatoid arthritis (HCC) 03/08/2016   • Anxiety and depression 03/08/2016   • Type 2 diabetes mellitus, uncontrolled, with neuropathy (HCC) 03/15/2016   • Fibrositis 03/15/2016   • Change in blood platelet count 03/15/2016   • Pancytopenia (HCC) 04/12/2016   • Hypersplenism 04/12/2016   • " Nausea & vomiting 06/14/2016   • DUKES (nonalcoholic steatohepatitis) 06/14/2016   • Hyperlipidemia    • Abdominal pain 02/14/2017   • Hematemesis 02/15/2017   • Ascites 02/21/2017   • Portal hypertension (HCC) 02/22/2017   • Secondary esophageal varices without bleeding (HCC) 02/22/2017   • Esophageal varices with bleeding (HCC) 03/07/2017   • Gastroparesis 10/17/2017   • 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 (HCC) 11/17/2017   • Diabetic ketoacidosis without coma associated with type 2 diabetes mellitus (HCC) 12/16/2017   • Diabetic peripheral neuropathy (HCC) 12/17/2017   • History of seizure disorder 12/17/2017   • Hepatic encephalopathy (HCC) 05/08/2018   • Thrombocytopenia (HCC) 05/16/2018   • Fever 05/17/2018   • Acute ITP (HCC) 05/18/2018   • Degeneration of lumbosacral intervertebral disc 05/30/2018   • Spondylosis without myelopathy 05/30/2018   • UGI bleed 10/04/2018   • Migraine without aura 10/11/2018   • Urinary retention 10/11/2018   • Uncontrolled diabetes mellitus with hyperglycemia (HCC) 12/13/2018   • BRICE (obstructive sleep apnea) 12/13/2018   • Gastroparesis 12/13/2018   • Hyperammonemia (HCC) 12/13/2018   • Hyponatremia 12/13/2018   • Anemia 12/13/2018   • Vitamin D insufficiency 12/16/2018   • Hyperglycemia 01/17/2019   • Iron deficiency anemia 01/22/2019   • Adverse effect of iron and its compounds, sequela 01/22/2019   • Hemorrhage of anus and rectum 02/15/2019   • Vulvar lesion 05/09/2019   • Acute upper GI bleed 05/09/2019   • Acute blood loss anemia 05/14/2019   • Acute hyperkalemia 05/14/2019   • Labial cyst 01/18/2021   • Transaminitis 02/03/2021   • Hyperbilirubinemia 02/03/2021   • Acute gastritis 02/03/2021   • UTI (urinary tract infection), bacterial 02/03/2021   • UTI (urinary tract infection) 02/03/2021   • Cholestatic pruritus 05/20/2020   • Fatty liver disease, nonalcoholic 05/20/2020   • Protein-calorie malnutrition, moderate (HCC) 05/20/2020   • Hyperglycemia due to type 1 diabetes  mellitus (HCC) 04/04/2021   • Ascites 04/05/2021   • Hospital discharge follow-up 04/12/2021     Resolved Ambulatory Problems     Diagnosis Date Noted   • Systemic lupus (HCC) 02/22/2017   • Upper GI bleed 04/25/2017   • Abnormal finding of blood chemistry  05/08/2018   • Seizure (HCC) 05/30/2018   • Intractable vomiting with nausea 10/03/2018   • Dehydration 01/17/2019   • Elevated procalcitonin 04/02/2021   • Intractable vomiting with nausea 04/29/2021     Past Medical History:   Diagnosis Date   • Anxiety    • Arthritis    • Broken shoulder    • Chromosomal abnormality    • Cirrhosis (HCC)    • Clostridium difficile infection    • Colon polyps    • Depression    • Diabetes mellitus (HCC)    • Duodenal ulcer with hemorrhage    • Esophageal varices determined by endoscopy (HCC)    • Fibromyalgia    • Gallbladder disease    • Gastric varices    • Glaucoma    • Granuloma annulare    • H/O B12 deficiency    • H/O Bleeding ulcer    • H/O mixed connective tissue disorder    • Hemorrhoids    • Hiatal hernia    • History of transfusion    • Hx of being hospitalized    • Migraine    • Mitral valve prolapse    • Orthostatic hypotension 02/2019   • Pancreas divisum    • Peptic ulcer disease    • PONV (postoperative nausea and vomiting)    • RA (rheumatoid arthritis) (HCC)    • Seizure disorder (HCC)    • Seizures (HCC)    • TIA (transient ischemic attack) 1984         PAST SURGICAL HISTORY  Past Surgical History:   Procedure Laterality Date   • BARTHOLIN CYST MARSUPIALIZATION Left 1/18/2021    Procedure: EXCISION OF LABIAL CYST;  Surgeon: Melinda Thakkar MD;  Location: [facility] OR;  Service: Obstetrics/Gynecology;  Laterality: Left;   • BLADDER REPAIR  1991   • CATARACT EXTRACTION     • CHOLECYSTECTOMY  1994   • COLONOSCOPY     • ENDOSCOPY N/A 11/7/2016    Procedure: ESOPHAGOGASTRODUODENOSCOPY WITH COLD POLYPECTOMY, BANDING,  AND CLIPS TIMES 2;  Surgeon: Rich Coleman MD;  Location: [facility] ENDOSCOPY;  Service:    •  "ENDOSCOPY N/A 12/22/2016    Procedure: ESOPHAGOGASTRODUODENOSCOPY WITH ESOPHAGEAL BANDING. 5 BANDS FIRED, 4 BANDS ADHERERD TO MUCOSA;  Surgeon: Rich Coleman MD;  Location: [facility] ENDOSCOPY;  Service:    • ENDOSCOPY N/A 2/15/2017    Procedure: ESOPHAGOGASTRODUODENOSCOPY AT BEDSIDE with esophageal varices banding (3);  Surgeon: Rich Coleman MD;  Location: [facility] ENDOSCOPY;  Service:    • ENDOSCOPY N/A 4/6/2017    Procedure: ESOPHAGOGASTRODUODENOSCOPY WITH ESOPHAGEAL VARICES BANDING x2;  Surgeon: Rich Coleman MD;  Location: [facility] ENDOSCOPY;  Service:    • ENDOSCOPY N/A 11/24/2017    Procedure: ESOPHAGOGASTRODUODENOSCOPY with biopsies;  Surgeon: Rich Coleman MD;  Location: [facility] ENDOSCOPY;  Service:    • ENDOSCOPY N/A 10/5/2018    Procedure: ESOPHAGOGASTRODUODENOSCOPY;  Surgeon: Rich Coleman MD;  Location: [facility] ENDOSCOPY;  Service: Gastroenterology   • ENDOSCOPY N/A 5/10/2019    Procedure: ESOPHAGOGASTRODUODENOSCOPY AT BEDSIDE WITH VARICEAL LIGATION;  Surgeon: Rich Coleman MD;  Location: [facility] ENDOSCOPY;  Service: Gastroenterology   • ENDOSCOPY N/A 6/28/2019    Procedure: ESOPHAGOGASTRODUODENOSCOPY WITH ESOPHAGEAL BANDING (3 BANDS);  Surgeon: Rich Coleman MD;  Location: [facility] ENDOSCOPY;  Service: Gastroenterology   • ENDOSCOPY N/A 4/3/2021    Procedure: ESOPHAGOGASTRODUODENOSCOPY WITH COLD BIOPSIES & BRUSHINGS;  Surgeon: Endy Vasquez MD;  Location: [facility] ENDOSCOPY;  Service: Gastroenterology;  Laterality: N/A;  PRE- N,V & DYSPHAGIA  POST- GASTRITIS, POSSIBLE FUNGAL ESOPHAGITIS   • ENDOSCOPY AND COLONOSCOPY  2014    Dr. Rich Coleman with findings of candida esophagitis   • EYE SURGERY     • HYSTERECTOMY  1986    partial   • JOINT REPLACEMENT     • KNEE SURGERY Right 1995    \"right knee recontstruction\"   • LIVER BIOPSY  01/2015   • MASS EXCISION     • STOMACH SURGERY     • TIPS PROCEDURE  2020    x2         FAMILY HISTORY  Family History   Problem Relation Age of Onset   • Liver " disease Other    • Depression Other    • Heart disease Other    • Hypertension Other    • Diabetes Other    • Breast cancer Other    • Brain cancer Other    • Uterine cancer Other    • Colon cancer Other    • Leukemia Other    • Colon cancer Maternal Aunt    • Hypertension Mother    • Diabetes type II Mother    • Rheum arthritis Mother    • Liver disease Mother         DUKES   • Heart disease Father    • Diabetes type II Father    • Diabetes type II Sister    • Lupus Sister    • Malig Hyperthermia Neg Hx          SOCIAL HISTORY  Social History     Socioeconomic History   • Marital status:      Spouse name: Jose   Tobacco Use   • Smoking status: Former Smoker     Packs/day: 1.00     Years: 4.00     Pack years: 4.00     Types: Cigarettes     Quit date: 2015     Years since quittin.1   • Smokeless tobacco: Never Used   Vaping Use   • Vaping Use: Never used   Substance and Sexual Activity   • Alcohol use: No   • Drug use: Never   • Sexual activity: Defer         ALLERGIES  Albuterol, Imitrex [sumatriptan], Tramadol, Coconut (cocos nucifera) allergy skin test, Nsaids, Tylenol [acetaminophen], Zofran [ondansetron hcl], Codeine, Lactulose, and Quinine derivatives        REVIEW OF SYSTEMS  Review of Systems     All systems reviewed and negative except for those discussed in HPI.       PHYSICAL EXAM    I have reviewed the triage vital signs and nursing notes.    ED Triage Vitals   Temp Heart Rate Resp BP SpO2   22 0931 22 0931 22 0931 22 0950 22   97.5 °F (36.4 °C) 74 16 114/64 99 %      Temp src Heart Rate Source Patient Position BP Location FiO2 (%)   2231 22 -- -- --   Tympanic Monitor          Physical Exam    Physical Exam   Constitutional: No distress.  Very drowsy.  Wakes for exam and falls back asleep very quickly.  HENT:  Head: Normocephalic and atraumatic.   Oropharynx: Mucous membranes are moist.   Eyes: No scleral icterus. No conjunctival  pallor.  PERRL, EOMI  Neck: Painless range of motion noted. Neck supple.  No meningismus or rigidity  Cardiovascular: Normal rate, regular rhythm and intact distal pulses.  Pulmonary/Chest: No respiratory distress.  No tachypnea or increased work of breathing.    Abdominal: Soft. There is mild diffuse discomfort palpation without focal tenderness. There is no rebound and no guarding.   Musculoskeletal: Moves all extremities equally. There is no pedal edema or calf tenderness.   Neurological: Wakes with exam.  Very drowsy.  Follows exam request in all 4 extremities.  Skin: Skin is pink, warm, and dry. No pallor.   Psychiatric: Flat affect.  Very drowsy.  Nursing note and vitals reviewed.    LAB RESULTS  Recent Results (from the past 24 hour(s))   Lipase    Collection Time: 02/08/22  9:56 AM    Specimen: Blood   Result Value Ref Range    Lipase 34 13 - 60 U/L   Comprehensive Metabolic Panel    Collection Time: 02/08/22  9:56 AM    Specimen: Blood   Result Value Ref Range    Glucose 168 (H) 65 - 99 mg/dL    BUN 20 6 - 20 mg/dL    Creatinine 1.17 (H) 0.57 - 1.00 mg/dL    Sodium 136 136 - 145 mmol/L    Potassium 3.4 (L) 3.5 - 5.2 mmol/L    Chloride 103 98 - 107 mmol/L    CO2 22.4 22.0 - 29.0 mmol/L    Calcium 8.9 8.6 - 10.5 mg/dL    Total Protein 6.5 6.0 - 8.5 g/dL    Albumin 3.90 3.50 - 5.20 g/dL    ALT (SGPT) 37 (H) 1 - 33 U/L    AST (SGOT) 35 (H) 1 - 32 U/L    Alkaline Phosphatase 118 (H) 39 - 117 U/L    Total Bilirubin 1.4 (H) 0.0 - 1.2 mg/dL    eGFR Non African Amer 47 (L) >60 mL/min/1.73    Globulin 2.6 gm/dL    A/G Ratio 1.5 g/dL    BUN/Creatinine Ratio 17.1 7.0 - 25.0    Anion Gap 10.6 5.0 - 15.0 mmol/L   Troponin    Collection Time: 02/08/22  9:56 AM    Specimen: Blood   Result Value Ref Range    Troponin T <0.010 0.000 - 0.030 ng/mL   CBC Auto Differential    Collection Time: 02/08/22  9:56 AM    Specimen: Blood   Result Value Ref Range    WBC 5.76 3.40 - 10.80 10*3/mm3    RBC 4.22 3.77 - 5.28 10*6/mm3     Hemoglobin 12.8 12.0 - 15.9 g/dL    Hematocrit 36.6 34.0 - 46.6 %    MCV 86.7 79.0 - 97.0 fL    MCH 30.3 26.6 - 33.0 pg    MCHC 35.0 31.5 - 35.7 g/dL    RDW 14.5 12.3 - 15.4 %    RDW-SD 46.3 37.0 - 54.0 fl    MPV 10.1 6.0 - 12.0 fL    Platelets 95 (L) 140 - 450 10*3/mm3    Neutrophil % 60.9 42.7 - 76.0 %    Lymphocyte % 25.3 19.6 - 45.3 %    Monocyte % 10.2 5.0 - 12.0 %    Eosinophil % 2.8 0.3 - 6.2 %    Basophil % 0.5 0.0 - 1.5 %    Immature Grans % 0.3 0.0 - 0.5 %    Neutrophils, Absolute 3.50 1.70 - 7.00 10*3/mm3    Lymphocytes, Absolute 1.46 0.70 - 3.10 10*3/mm3    Monocytes, Absolute 0.59 0.10 - 0.90 10*3/mm3    Eosinophils, Absolute 0.16 0.00 - 0.40 10*3/mm3    Basophils, Absolute 0.03 0.00 - 0.20 10*3/mm3    Immature Grans, Absolute 0.02 0.00 - 0.05 10*3/mm3    nRBC 0.0 0.0 - 0.2 /100 WBC   BNP    Collection Time: 02/08/22  9:56 AM    Specimen: Blood   Result Value Ref Range    proBNP 206.0 0.0 - 900.0 pg/mL   Magnesium    Collection Time: 02/08/22  9:56 AM    Specimen: Blood   Result Value Ref Range    Magnesium 1.8 1.6 - 2.6 mg/dL   TSH    Collection Time: 02/08/22  9:56 AM    Specimen: Blood   Result Value Ref Range    TSH 13.300 (H) 0.270 - 4.200 uIU/mL   ECG 12 Lead    Collection Time: 02/08/22 10:12 AM   Result Value Ref Range    QT Interval 447 ms   COVID-19,[facility] IN-HOUSE CEPHEID/DAYAMI NP SWAB IN TRANSPORT MEDIA 8-12 HR TAT - Swab, Nasopharynx    Collection Time: 02/08/22 10:40 AM    Specimen: Nasopharynx; Swab   Result Value Ref Range    COVID19 Not Detected Not Detected - Ref. Range   Troponin    Collection Time: 02/08/22 12:28 PM    Specimen: Blood   Result Value Ref Range    Troponin T <0.010 0.000 - 0.030 ng/mL   Urinalysis With Microscopic If Indicated (No Culture) - Urine, Clean Catch    Collection Time: 02/08/22 12:50 PM    Specimen: Urine, Clean Catch   Result Value Ref Range    Color, UA Dark Yellow (A) Yellow, Straw    Appearance, UA Clear Clear    pH, UA <=5.0 5.0 - 8.0    Specific  Gravity, UA 1.022 1.005 - 1.030    Glucose, UA Negative Negative    Ketones, UA Trace (A) Negative    Bilirubin, UA Negative Negative    Blood, UA Negative Negative    Protein, UA Trace (A) Negative    Leuk Esterase, UA Trace (A) Negative    Nitrite, UA Negative Negative    Urobilinogen, UA 0.2 E.U./dL 0.2 - 1.0 E.U./dL   Urine Drug Screen - Urine, Clean Catch    Collection Time: 02/08/22 12:50 PM    Specimen: Urine, Clean Catch   Result Value Ref Range    Amphet/Methamphet, Screen Negative Negative    Barbiturates Screen, Urine Negative Negative    Benzodiazepine Screen, Urine Positive (A) Negative    Cocaine Screen, Urine Negative Negative    Opiate Screen Negative Negative    THC, Screen, Urine Negative Negative    Methadone Screen, Urine Negative Negative    Oxycodone Screen, Urine Negative Negative   Urinalysis, Microscopic Only - Urine, Clean Catch    Collection Time: 02/08/22 12:50 PM    Specimen: Urine, Clean Catch   Result Value Ref Range    RBC, UA 0-2 None Seen, 0-2 /HPF    WBC, UA 13-20 (A) None Seen, 0-2 /HPF    Bacteria, UA None Seen None Seen /HPF    Squamous Epithelial Cells, UA 0-2 None Seen, 0-2 /HPF    Hyaline Casts, UA 3-6 None Seen /LPF    Methodology Automated Microscopy        Ordered the above labs and independently reviewed the results.        RADIOLOGY  XR Chest 1 View    Result Date: 2/8/2022  SINGLE VIEW CHEST RADIOGRAPH  HISTORY: 61-year-old female with a history of dyspnea.  FINDINGS: A portable AP chest radiograph was obtained. Comparison is made to a chest radiograph dated 09/20/2021. The lungs are normal in volume and are clear. The cardiac silhouette is within normal limits. No bony abnormality of the chest is appreciated.      Negative chest radiograph.        I ordered the above noted radiological studies. Reviewed by me and discussed with radiologist.  See dictation for official radiology interpretation.      PROCEDURES    Procedures        MEDICATIONS GIVEN IN ER    Medications    sodium chloride 0.9 % flush 10 mL (has no administration in time range)   scopolamine patch 1 mg/72 hr (1 patch Transdermal Medication Applied 2/8/22 1210)   sodium chloride 0.9 % infusion (125 mL/hr Intravenous New Bag 2/8/22 1323)   cefTRIAXone (ROCEPHIN) 1 g in sodium chloride 0.9 % 100 mL IVPB-VTB (1 g Intravenous New Bag 2/8/22 1429)   sodium chloride 0.9 % bolus 500 mL (0 mL Intravenous Stopped 2/8/22 1228)         PROGRESS, DATA ANALYSIS, CONSULTS, AND MEDICAL DECISION MAKING    My differential diagnosis for syncope includes but is not limited to:  Vasovagal reflex - situational stimulus, micturition, defecation, cough, sneezing, swallowing, postprandial state, react sinus hypersensitivity  Vascular-prolonged recumbency, sudden postural change, prolonged standing, hypovolemia, vasodilator drugs, autonomic neuropathy, adrenal insufficiency, subclavian steal, pulmonary embolism  Cardiac -arrhythmia, heart block, myocardial infarction, aortic stenosis, cardiac myxoma, cardiac, LV Dysfunction, Aortic Dissection, Pulmonary Hypertension, Pulmonary Stenosis, Pacemaker Failure  CNS-seizure, hypoxia, hypoglycemia, TIA,(basal vertebral), hydrocephalus      All labs have been independently reviewed by me.  All radiology studies have been reviewed by me and discussed with radiologist dictating the report.   EKG's independently viewed and interpreted by me.  Discussion below represents my analysis of pertinent findings related to patient's condition, differential diagnosis, treatment plan and final disposition.      ED Course as of 02/08/22 1444   Tue Feb 08, 2022   1021 EKG           EKG time: 1012  Rhythm/Rate: Sinus, 80  P waves and SD: DANE within normal limits  QRS, axis: IVCD consistent with right bundle branch block  ST and T waves: ST/T wave repolarization abnormality with no evidence of STEMI    Interpreted Contemporaneously by me, independently viewed  Comparison: 4/29/2021-new right bundle branch block   [RS]    1036 WBC: 5.76 [RS]   1036 Hemoglobin: 12.8 [RS]   1042 Patient confirms, not currently established with cardiology with no prior known coronary artery disease.  She admits to history of mitral valve prolapse. [RS]   1147 COVID19: Not Detected [RS]   1147 Troponin T: <0.010 [RS]   1148 Patient became somewhat hypoxemic with sleep, recovers when she is awakened.  She was placed on 2 L supplemental oxygen by nasal cannula by nursing staff. [RS]   1148 Creatinine(!): 1.17  Rehydration in process. [RS]   1223 CONSULT        Provider: Dr. Smith-cardiology    Discussion: Reviewed patient history, ED presentation and evaluation.  Discussed new right bundle branch block.  No significant concern about new RBBB.  Agrees with continued medical evaluation.    Agreeable c treatment and planned disposition.         [RS]   1410 Leukocytes, UA(!): Trace [RS]   1410 WBC, UA(!): 13-20 [RS]   1410 Bacteria, UA: None Seen [RS]   1410 TSH Baseline(!): 13.300 [RS]   1411 Troponin T: <0.010  Neg second set [RS]   1415 Patient reports that she is feeling okay at this time.  We reviewed her lab results including her thyroid and urinalysis.  She is agreeable with plan for treatment of UTI.  She also reports that somebody had mentioned she had some abnormal thyroid tests at some point in the past but has never taken action on them.  I recommended observation admission tonight to tune her up little bit better and get internal medicine's opinion about thyroid management.  She is agreeable with this plan. [RS]   1443 CONSULT        Provider: [facility]    Discussion: Reviewed patient history, ED presentation and evaluation as well as concerning findings. She is agreeable except the patient for observation admission with telemetry for further evaluation and treatment.    Agreeable c treatment and planned disposition.         [RS]      ED Course User Index  [RS] Donaldo Mendoza MD       AS OF 14:44 EST VITALS:    BP - 123/66  HR - 80  TEMP -  97.5 °F (36.4 °C) (Tympanic)  O2 SATS - 96%        DIAGNOSIS  Final diagnoses:   Near syncope   New onset right bundle branch block (RBBB)   Polypharmacy   Acute UTI   Hypothyroidism, unspecified type         DISPOSITION  ADMISSION    Discussed treatment plan and reason for admission with pt/family and admitting physician.  Pt/family voiced understanding of the plan for admission for further testing/treatment as needed.          Donaldo Mendoza MD  02/08/22 [id number]

## 2022-02-08 NOTE — ED NOTES
Nursing report ED to floor  Silvia Zabala  61 y.o.  female    HPI :   Chief Complaint   Patient presents with   • Syncope       Admitting doctor:   Reva Angela MD    Admitting diagnosis:   The primary encounter diagnosis was Near syncope. Diagnoses of New onset right bundle branch block (RBBB), Polypharmacy, Acute UTI, and Hypothyroidism, unspecified type were also pertinent to this visit.    Code status:   Current Code Status     Date Active Code Status Order ID Comments User Context       Prior    Advance Care Planning Activity          Allergies:   Albuterol, Imitrex [sumatriptan], Tramadol, Coconut (cocos nucifera) allergy skin test, Nsaids, Tylenol [acetaminophen], Zofran [ondansetron hcl], Codeine, Lactulose, and Quinine derivatives    Intake and Output    Intake/Output Summary (Last 24 hours) at 2/8/2022 1458  Last data filed at 2/8/2022 1228  Gross per 24 hour   Intake 500 ml   Output --   Net 500 ml       Weight:   There were no vitals filed for this visit.    Most recent vitals:   Vitals:    02/08/22 1301 02/08/22 1331 02/08/22 1401 02/08/22 1431   BP: 121/57 123/65 123/66 131/68   Pulse: 81 78 80 78   Resp:       Temp:       TempSrc:       SpO2: 99% 96% 96% 100%       Active LDAs/IV Access:   Lines, Drains & Airways     Active LDAs     Name Placement date Placement time Site Days    Peripheral IV 02/08/22 0957 Right Antecubital 02/08/22  0957  Antecubital  less than 1                Labs (abnormal labs have a star):   Labs Reviewed   COMPREHENSIVE METABOLIC PANEL - Abnormal; Notable for the following components:       Result Value    Glucose 168 (*)     Creatinine 1.17 (*)     Potassium 3.4 (*)     ALT (SGPT) 37 (*)     AST (SGOT) 35 (*)     Alkaline Phosphatase 118 (*)     Total Bilirubin 1.4 (*)     eGFR Non  Amer 47 (*)     All other components within normal limits    Narrative:     GFR Normal >60  Chronic Kidney Disease <60  Kidney Failure <15     CBC WITH AUTO DIFFERENTIAL -  Abnormal; Notable for the following components:    Platelets 95 (*)     All other components within normal limits   URINALYSIS W/ MICROSCOPIC IF INDICATED (NO CULTURE) - Abnormal; Notable for the following components:    Color, UA Dark Yellow (*)     Ketones, UA Trace (*)     Protein, UA Trace (*)     Leuk Esterase, UA Trace (*)     All other components within normal limits   TSH - Abnormal; Notable for the following components:    TSH 13.300 (*)     All other components within normal limits   URINE DRUG SCREEN - Abnormal; Notable for the following components:    Benzodiazepine Screen, Urine Positive (*)     All other components within normal limits    Narrative:     Negative Thresholds Per Drugs Screened:    Amphetamines                 500 ng/ml  Barbiturates                 200 ng/ml  Benzodiazepines              100 ng/ml  Cocaine                      300 ng/ml  Methadone                    300 ng/ml  Opiates                      300 ng/ml  Oxycodone                    100 ng/ml  THC                           50 ng/ml    The Normal Value for all drugs tested is negative. This report includes final unconfirmed screening results to be used for medical treatment purposes only. Unconfirmed results must not be used for non-medical purposes such as employment or legal testing. Clinical consideration should be applied to any drug of abuse test, particularly when unconfirmed results are used.           URINALYSIS, MICROSCOPIC ONLY - Abnormal; Notable for the following components:    WBC, UA 13-20 (*)     All other components within normal limits   COVID-19,[facility] IN-HOUSE CEPHEID/DAYAMI, NP SWAB IN TRANSPORT MEDIA 8-12 HR TAT - Normal    Narrative:     Fact sheet for providers: https://www.fda.gov/media/536202/download    Fact sheet for patients: https://www.fda.gov/media/915583/download    Test performed by PCR.   LIPASE - Normal   TROPONIN (IN-HOUSE) - Normal    Narrative:     Troponin T Reference Range:  <= 0.03 ng/mL-    Negative for AMI  >0.03 ng/mL-     Abnormal for myocardial necrosis.  Clinicians would have to utilize clinical acumen, EKG, Troponin and serial changes to determine if it is an Acute Myocardial Infarction or myocardial injury due to an underlying chronic condition.       Results may be falsely decreased if patient taking Biotin.     TROPONIN (IN-HOUSE) - Normal    Narrative:     Troponin T Reference Range:  <= 0.03 ng/mL-   Negative for AMI  >0.03 ng/mL-     Abnormal for myocardial necrosis.  Clinicians would have to utilize clinical acumen, EKG, Troponin and serial changes to determine if it is an Acute Myocardial Infarction or myocardial injury due to an underlying chronic condition.       Results may be falsely decreased if patient taking Biotin.     BNP (IN-HOUSE) - Normal    Narrative:     Among patients with dyspnea, NT-proBNP is highly sensitive for the detection of acute congestive heart failure. In addition NT-proBNP of <300 pg/ml effectively rules out acute congestive heart failure with 99% negative predictive value.    Results may be falsely decreased if patient taking Biotin.     MAGNESIUM - Normal   URINE CULTURE   T4, FREE   T3, FREE   CBC AND DIFFERENTIAL    Narrative:     The following orders were created for panel order CBC & Differential.  Procedure                               Abnormality         Status                     ---------                               -----------         ------                     CBC Auto Differential[[id number]]        Abnormal            Final result                 Please view results for these tests on the individual orders.       EKG:   ECG 12 Lead   Final Result   HEART RATE= 79  bpm   RR Interval= 756  ms   CA Interval= 159  ms   P Horizontal Axis= -4  deg   P Front Axis= 46  deg   QRSD Interval= 144  ms   QT Interval= 447  ms   QRS Axis= 93  deg   T Wave Axis= 16  deg   - ABNORMAL ECG -   Sinus rhythm   RBBB and LPFB   SINCE PREVIOUS ECG, rbbb is new    Electronically Signed By: Abdelrahman Acevedo ([facility]) 2022 14:06:44   Date and Time of Study: 2022 10:12:34          Meds given in ED:   Medications   sodium chloride 0.9 % flush 10 mL (has no administration in time range)   scopolamine patch 1 mg/72 hr (1 patch Transdermal Medication Applied 22 1210)   sodium chloride 0.9 % infusion (125 mL/hr Intravenous New Bag 22 1323)   cefTRIAXone (ROCEPHIN) 1 g in sodium chloride 0.9 % 100 mL IVPB-VTB (1 g Intravenous New Bag 22 1429)   sodium chloride 0.9 % bolus 500 mL (0 mL Intravenous Stopped 22 1228)       Imaging results:  XR Chest 1 View    Result Date: 2022  Negative chest radiograph.        Ambulatory status:   - stand by assist     Social issues:   Social History     Socioeconomic History   • Marital status:      Spouse name: Jose   Tobacco Use   • Smoking status: Former Smoker     Packs/day: 1.00     Years: 4.00     Pack years: 4.00     Types: Cigarettes     Quit date: 2015     Years since quittin.1   • Smokeless tobacco: Never Used   Vaping Use   • Vaping Use: Never used   Substance and Sexual Activity   • Alcohol use: No   • Drug use: Never   • Sexual activity: Defer       NIH Stroke Scale:        Nursing report ED to floor:       Ora Valdez RN  [phone]

## 2022-02-08 NOTE — PROGRESS NOTES
Pharmacy consult for Lovenox dosing  Silvia Zabala is a 61 y.o. female 82.7 kg (182 lb 5.1 oz).    Pharmacy consulted to dose per Dr. Angela  Indication: VTE Prophylaxis    Home Anticoagulation: none  Active Inpatient Anticoagulation Orders: none    Estimated Creatinine Clearance: 57.2 mL/min (A) (by C-G formula based on SCr of 1.17 mg/dL (H)).  Body mass index is 28.56 kg/m².    Creatinine   Date Value Ref Range Status   02/08/2022 1.17 (H) 0.57 - 1.00 mg/dL Final     Platelets   Date Value Ref Range Status   02/08/2022 95 (L) 140 - 450 10*3/mm3 Final     Hemoglobin   Date Value Ref Range Status   02/08/2022 12.8 12.0 - 15.9 g/dL Final     Lab Results   Component Value Date    INR 1.54 (H) 05/03/2021     No results found for: DDIMERQUANT, DDIMERQUANTI, DDIMER    PLAN:  Will start Lovenox 40 mg SQ Q24hr.  Pharmacy will continue to follow and adjust as needed.      Baljit Borges, PharmD

## 2022-02-09 ENCOUNTER — APPOINTMENT (OUTPATIENT)
Dept: CARDIOLOGY | Facility: HOSPITAL | Age: 62
End: 2022-02-09

## 2022-02-09 PROBLEM — R79.89 ABNORMAL TSH: Status: ACTIVE | Noted: 2022-02-09

## 2022-02-09 LAB
ANION GAP SERPL CALCULATED.3IONS-SCNC: 10.8 MMOL/L (ref 5–15)
AORTIC ARCH: 3 CM
AORTIC DIMENSIONLESS INDEX: 0.7 (DI)
ASCENDING AORTA: 2.9 CM
BACTERIA SPEC AEROBE CULT: NO GROWTH
BH CV ECHO MEAS - ACS: 2.1 CM
BH CV ECHO MEAS - AO MAX PG (FULL): 8.2 MMHG
BH CV ECHO MEAS - AO MAX PG: 13.6 MMHG
BH CV ECHO MEAS - AO MEAN PG (FULL): 3.3 MMHG
BH CV ECHO MEAS - AO MEAN PG: 6.2 MMHG
BH CV ECHO MEAS - AO ROOT AREA (BSA CORRECTED): 1.7
BH CV ECHO MEAS - AO ROOT AREA: 9.4 CM^2
BH CV ECHO MEAS - AO ROOT DIAM: 3.5 CM
BH CV ECHO MEAS - AO V2 MAX: 184.2 CM/SEC
BH CV ECHO MEAS - AO V2 MEAN: 114.6 CM/SEC
BH CV ECHO MEAS - AO V2 VTI: 35.7 CM
BH CV ECHO MEAS - ASC AORTA: 2.9 CM
BH CV ECHO MEAS - AVA(I,A): 2.1 CM^2
BH CV ECHO MEAS - AVA(I,D): 2.1 CM^2
BH CV ECHO MEAS - AVA(V,A): 1.8 CM^2
BH CV ECHO MEAS - AVA(V,D): 1.8 CM^2
BH CV ECHO MEAS - BSA(HAYCOCK): 2.1 M^2
BH CV ECHO MEAS - BSA: 2 M^2
BH CV ECHO MEAS - BZI_BMI: 27.8 KILOGRAMS/M^2
BH CV ECHO MEAS - BZI_METRIC_HEIGHT: 175.3 CM
BH CV ECHO MEAS - BZI_METRIC_WEIGHT: 85.3 KG
BH CV ECHO MEAS - EDV(CUBED): 105.3 ML
BH CV ECHO MEAS - EDV(MOD-SP2): 89 ML
BH CV ECHO MEAS - EDV(MOD-SP4): 88 ML
BH CV ECHO MEAS - EDV(TEICH): 103.5 ML
BH CV ECHO MEAS - EF(CUBED): 77.2 %
BH CV ECHO MEAS - EF(MOD-BP): 67.9 %
BH CV ECHO MEAS - EF(MOD-SP2): 66.3 %
BH CV ECHO MEAS - EF(MOD-SP4): 70.5 %
BH CV ECHO MEAS - EF(TEICH): 69.3 %
BH CV ECHO MEAS - ESV(CUBED): 24 ML
BH CV ECHO MEAS - ESV(MOD-SP2): 30 ML
BH CV ECHO MEAS - ESV(MOD-SP4): 26 ML
BH CV ECHO MEAS - ESV(TEICH): 31.8 ML
BH CV ECHO MEAS - FS: 38.9 %
BH CV ECHO MEAS - IVS/LVPW: 0.97
BH CV ECHO MEAS - IVSD: 0.98 CM
BH CV ECHO MEAS - LAT PEAK E' VEL: 6.9 CM/SEC
BH CV ECHO MEAS - LV DIASTOLIC VOL/BSA (35-75): 43.7 ML/M^2
BH CV ECHO MEAS - LV MASS(C)D: 163.9 GRAMS
BH CV ECHO MEAS - LV MASS(C)DI: 81.5 GRAMS/M^2
BH CV ECHO MEAS - LV MAX PG: 5.4 MMHG
BH CV ECHO MEAS - LV MEAN PG: 3 MMHG
BH CV ECHO MEAS - LV SYSTOLIC VOL/BSA (12-30): 12.9 ML/M^2
BH CV ECHO MEAS - LV V1 MAX: 116.1 CM/SEC
BH CV ECHO MEAS - LV V1 MEAN: 79.6 CM/SEC
BH CV ECHO MEAS - LV V1 VTI: 26.1 CM
BH CV ECHO MEAS - LVIDD: 4.7 CM
BH CV ECHO MEAS - LVIDS: 2.9 CM
BH CV ECHO MEAS - LVLD AP2: 7.3 CM
BH CV ECHO MEAS - LVLD AP4: 7.7 CM
BH CV ECHO MEAS - LVLS AP2: 6.5 CM
BH CV ECHO MEAS - LVLS AP4: 6.1 CM
BH CV ECHO MEAS - LVOT AREA (M): 2.8 CM^2
BH CV ECHO MEAS - LVOT AREA: 2.8 CM^2
BH CV ECHO MEAS - LVOT DIAM: 1.9 CM
BH CV ECHO MEAS - LVPWD: 1 CM
BH CV ECHO MEAS - MED PEAK E' VEL: 6.3 CM/SEC
BH CV ECHO MEAS - MV A DUR: 0.16 SEC
BH CV ECHO MEAS - MV A MAX VEL: 113.5 CM/SEC
BH CV ECHO MEAS - MV DEC SLOPE: 403.5 CM/SEC^2
BH CV ECHO MEAS - MV DEC TIME: 285 SEC
BH CV ECHO MEAS - MV E MAX VEL: 93.8 CM/SEC
BH CV ECHO MEAS - MV E/A: 0.83
BH CV ECHO MEAS - MV MAX PG: 5.6 MMHG
BH CV ECHO MEAS - MV MEAN PG: 2.4 MMHG
BH CV ECHO MEAS - MV P1/2T MAX VEL: 97 CM/SEC
BH CV ECHO MEAS - MV P1/2T: 70.4 MSEC
BH CV ECHO MEAS - MV V2 MAX: 117.9 CM/SEC
BH CV ECHO MEAS - MV V2 MEAN: 73.3 CM/SEC
BH CV ECHO MEAS - MV V2 VTI: 23.6 CM
BH CV ECHO MEAS - MVA P1/2T LCG: 2.3 CM^2
BH CV ECHO MEAS - MVA(P1/2T): 3.1 CM^2
BH CV ECHO MEAS - MVA(VTI): 3.1 CM^2
BH CV ECHO MEAS - PA ACC TIME: 0.12 SEC
BH CV ECHO MEAS - PA MAX PG (FULL): 1.7 MMHG
BH CV ECHO MEAS - PA MAX PG: 3.7 MMHG
BH CV ECHO MEAS - PA PR(ACCEL): 26.7 MMHG
BH CV ECHO MEAS - PA V2 MAX: 96.2 CM/SEC
BH CV ECHO MEAS - PULM A REVS DUR: 0.15 SEC
BH CV ECHO MEAS - PULM A REVS VEL: 39.5 CM/SEC
BH CV ECHO MEAS - PULM DIAS VEL: 55.7 CM/SEC
BH CV ECHO MEAS - PULM S/D: 0.89
BH CV ECHO MEAS - PULM SYS VEL: 49.5 CM/SEC
BH CV ECHO MEAS - PVA(V,A): 2.4 CM^2
BH CV ECHO MEAS - PVA(V,D): 2.4 CM^2
BH CV ECHO MEAS - QP/QS: 0.67
BH CV ECHO MEAS - RAP SYSTOLE: 3 MMHG
BH CV ECHO MEAS - RV MAX PG: 2 MMHG
BH CV ECHO MEAS - RV MEAN PG: 1 MMHG
BH CV ECHO MEAS - RV V1 MAX: 69.8 CM/SEC
BH CV ECHO MEAS - RV V1 MEAN: 48.2 CM/SEC
BH CV ECHO MEAS - RV V1 VTI: 15.1 CM
BH CV ECHO MEAS - RVOT AREA: 3.3 CM^2
BH CV ECHO MEAS - RVOT DIAM: 2 CM
BH CV ECHO MEAS - RVSP: 35.8 MMHG
BH CV ECHO MEAS - SI(AO): 166.6 ML/M^2
BH CV ECHO MEAS - SI(CUBED): 40.4 ML/M^2
BH CV ECHO MEAS - SI(LVOT): 36.8 ML/M^2
BH CV ECHO MEAS - SI(MOD-SP2): 29.3 ML/M^2
BH CV ECHO MEAS - SI(MOD-SP4): 30.8 ML/M^2
BH CV ECHO MEAS - SI(TEICH): 35.6 ML/M^2
BH CV ECHO MEAS - SV(AO): 335.1 ML
BH CV ECHO MEAS - SV(CUBED): 81.3 ML
BH CV ECHO MEAS - SV(LVOT): 74 ML
BH CV ECHO MEAS - SV(MOD-SP2): 59 ML
BH CV ECHO MEAS - SV(MOD-SP4): 62 ML
BH CV ECHO MEAS - SV(RVOT): 49.4 ML
BH CV ECHO MEAS - SV(TEICH): 71.7 ML
BH CV ECHO MEAS - TAPSE (>1.6): 2.3 CM
BH CV ECHO MEAS - TR MAX VEL: 286.4 CM/SEC
BH CV ECHO MEASUREMENTS AVERAGE E/E' RATIO: 14.21
BH CV VAS BP RIGHT ARM: NORMAL MMHG
BH CV XLRA - RV BASE: 3.2 CM
BH CV XLRA - RV LENGTH: 8 CM
BH CV XLRA - RV MID: 3.8 CM
BH CV XLRA - TDI S': 13.6 CM/SEC
BH CV XLRA MEAS LEFT CCA RATIO VEL: -139 CM/SEC
BH CV XLRA MEAS LEFT DIST CCA EDV: -23.3 CM/SEC
BH CV XLRA MEAS LEFT DIST CCA PSV: -138.6 CM/SEC
BH CV XLRA MEAS LEFT DIST ICA EDV: -23 CM/SEC
BH CV XLRA MEAS LEFT DIST ICA PSV: -87.8 CM/SEC
BH CV XLRA MEAS LEFT ICA RATIO VEL: -103 CM/SEC
BH CV XLRA MEAS LEFT ICA/CCA RATIO: 0.74
BH CV XLRA MEAS LEFT MID ICA EDV: -24.7 CM/SEC
BH CV XLRA MEAS LEFT MID ICA PSV: -102.6 CM/SEC
BH CV XLRA MEAS LEFT PROX CCA EDV: 24.7 CM/SEC
BH CV XLRA MEAS LEFT PROX CCA PSV: 166 CM/SEC
BH CV XLRA MEAS LEFT PROX ECA EDV: -15.1 CM/SEC
BH CV XLRA MEAS LEFT PROX ECA PSV: -116.6 CM/SEC
BH CV XLRA MEAS LEFT PROX ICA EDV: -22 CM/SEC
BH CV XLRA MEAS LEFT PROX ICA PSV: -97.7 CM/SEC
BH CV XLRA MEAS LEFT PROX SCLA PSV: 195.1 CM/SEC
BH CV XLRA MEAS LEFT VERTEBRAL A EDV: -14.3 CM/SEC
BH CV XLRA MEAS LEFT VERTEBRAL A PSV: -81.2 CM/SEC
BH CV XLRA MEAS RIGHT CCA RATIO VEL: -109 CM/SEC
BH CV XLRA MEAS RIGHT DIST CCA EDV: -17.3 CM/SEC
BH CV XLRA MEAS RIGHT DIST CCA PSV: -109.2 CM/SEC
BH CV XLRA MEAS RIGHT DIST ICA EDV: -27.7 CM/SEC
BH CV XLRA MEAS RIGHT DIST ICA PSV: -109.2 CM/SEC
BH CV XLRA MEAS RIGHT ICA RATIO VEL: -109 CM/SEC
BH CV XLRA MEAS RIGHT ICA/CCA RATIO: 1
BH CV XLRA MEAS RIGHT MID ICA EDV: -28.6 CM/SEC
BH CV XLRA MEAS RIGHT MID ICA PSV: -103.1 CM/SEC
BH CV XLRA MEAS RIGHT PROX CCA EDV: 21.7 CM/SEC
BH CV XLRA MEAS RIGHT PROX CCA PSV: 166.4 CM/SEC
BH CV XLRA MEAS RIGHT PROX ECA EDV: -8.7 CM/SEC
BH CV XLRA MEAS RIGHT PROX ECA PSV: -151.6 CM/SEC
BH CV XLRA MEAS RIGHT PROX ICA EDV: -22.5 CM/SEC
BH CV XLRA MEAS RIGHT PROX ICA PSV: -85.8 CM/SEC
BH CV XLRA MEAS RIGHT PROX SCLA PSV: 168.7 CM/SEC
BH CV XLRA MEAS RIGHT VERTEBRAL A EDV: 17.3 CM/SEC
BH CV XLRA MEAS RIGHT VERTEBRAL A PSV: 75.4 CM/SEC
BUN SERPL-MCNC: 15 MG/DL (ref 6–20)
BUN/CREAT SERPL: 17.9 (ref 7–25)
CALCIUM SPEC-SCNC: 7.9 MG/DL (ref 8.6–10.5)
CHLORIDE SERPL-SCNC: 108 MMOL/L (ref 98–107)
CO2 SERPL-SCNC: 19.2 MMOL/L (ref 22–29)
CREAT SERPL-MCNC: 0.84 MG/DL (ref 0.57–1)
DEPRECATED RDW RBC AUTO: 50 FL (ref 37–54)
ERYTHROCYTE [DISTWIDTH] IN BLOOD BY AUTOMATED COUNT: 14.7 % (ref 12.3–15.4)
GFR SERPL CREATININE-BSD FRML MDRD: 69 ML/MIN/1.73
GLUCOSE BLDC GLUCOMTR-MCNC: 104 MG/DL (ref 70–130)
GLUCOSE BLDC GLUCOMTR-MCNC: 287 MG/DL (ref 70–130)
GLUCOSE BLDC GLUCOMTR-MCNC: 390 MG/DL (ref 70–130)
GLUCOSE BLDC GLUCOMTR-MCNC: 434 MG/DL (ref 70–130)
GLUCOSE BLDC GLUCOMTR-MCNC: 438 MG/DL (ref 70–130)
GLUCOSE BLDC GLUCOMTR-MCNC: 56 MG/DL (ref 70–130)
GLUCOSE SERPL-MCNC: 293 MG/DL (ref 65–99)
HBA1C MFR BLD: 7.5 % (ref 4.8–5.6)
HCT VFR BLD AUTO: 33 % (ref 34–46.6)
HGB BLD-MCNC: 10.8 G/DL (ref 12–15.9)
LEFT ARM BP: NORMAL MMHG
LEFT ATRIUM VOLUME INDEX: 39 ML/M2
LV EF 2D ECHO EST: 70 %
MAXIMAL PREDICTED HEART RATE: 161 BPM
MAXIMAL PREDICTED HEART RATE: 161 BPM
MCH RBC QN AUTO: 30 PG (ref 26.6–33)
MCHC RBC AUTO-ENTMCNC: 32.7 G/DL (ref 31.5–35.7)
MCV RBC AUTO: 91.7 FL (ref 79–97)
PLATELET # BLD AUTO: 60 10*3/MM3 (ref 140–450)
PMV BLD AUTO: 10.7 FL (ref 6–12)
POTASSIUM SERPL-SCNC: 4.1 MMOL/L (ref 3.5–5.2)
RBC # BLD AUTO: 3.6 10*6/MM3 (ref 3.77–5.28)
RIGHT ARM BP: NORMAL MMHG
SINUS: 3.3 CM
SODIUM SERPL-SCNC: 138 MMOL/L (ref 136–145)
STJ: 2.7 CM
STRESS TARGET HR: 137 BPM
STRESS TARGET HR: 137 BPM
WBC NRBC COR # BLD: 2.51 10*3/MM3 (ref 3.4–10.8)

## 2022-02-09 PROCEDURE — 85027 COMPLETE CBC AUTOMATED: CPT | Performed by: INTERNAL MEDICINE

## 2022-02-09 PROCEDURE — 25010000002 ENOXAPARIN PER 10 MG: Performed by: INTERNAL MEDICINE

## 2022-02-09 PROCEDURE — 25010000002 CEFTRIAXONE PER 250 MG: Performed by: INTERNAL MEDICINE

## 2022-02-09 PROCEDURE — 93306 TTE W/DOPPLER COMPLETE: CPT

## 2022-02-09 PROCEDURE — 83036 HEMOGLOBIN GLYCOSYLATED A1C: CPT | Performed by: INTERNAL MEDICINE

## 2022-02-09 PROCEDURE — 97116 GAIT TRAINING THERAPY: CPT

## 2022-02-09 PROCEDURE — 82962 GLUCOSE BLOOD TEST: CPT

## 2022-02-09 PROCEDURE — 93880 EXTRACRANIAL BILAT STUDY: CPT

## 2022-02-09 PROCEDURE — G0378 HOSPITAL OBSERVATION PER HR: HCPCS

## 2022-02-09 PROCEDURE — 93306 TTE W/DOPPLER COMPLETE: CPT | Performed by: INTERNAL MEDICINE

## 2022-02-09 PROCEDURE — 92610 EVALUATE SWALLOWING FUNCTION: CPT

## 2022-02-09 PROCEDURE — 96366 THER/PROPH/DIAG IV INF ADDON: CPT

## 2022-02-09 PROCEDURE — 96361 HYDRATE IV INFUSION ADD-ON: CPT

## 2022-02-09 PROCEDURE — 97165 OT EVAL LOW COMPLEX 30 MIN: CPT

## 2022-02-09 PROCEDURE — 97161 PT EVAL LOW COMPLEX 20 MIN: CPT

## 2022-02-09 PROCEDURE — 97535 SELF CARE MNGMENT TRAINING: CPT

## 2022-02-09 PROCEDURE — 63710000001 INSULIN LISPRO (HUMAN) PER 5 UNITS: Performed by: INTERNAL MEDICINE

## 2022-02-09 PROCEDURE — 80048 BASIC METABOLIC PNL TOTAL CA: CPT | Performed by: INTERNAL MEDICINE

## 2022-02-09 PROCEDURE — 96372 THER/PROPH/DIAG INJ SC/IM: CPT

## 2022-02-09 PROCEDURE — 99204 OFFICE O/P NEW MOD 45 MIN: CPT | Performed by: INTERNAL MEDICINE

## 2022-02-09 RX ADMIN — LEVETIRACETAM 500 MG: 500 TABLET, FILM COATED ORAL at 20:19

## 2022-02-09 RX ADMIN — ENOXAPARIN SODIUM 40 MG: 100 INJECTION SUBCUTANEOUS at 20:19

## 2022-02-09 RX ADMIN — LEVETIRACETAM 500 MG: 500 TABLET, FILM COATED ORAL at 08:59

## 2022-02-09 RX ADMIN — METOCLOPRAMIDE 10 MG: 10 TABLET ORAL at 12:33

## 2022-02-09 RX ADMIN — CEFTRIAXONE 1 G: 1 INJECTION, POWDER, FOR SOLUTION INTRAMUSCULAR; INTRAVENOUS at 14:49

## 2022-02-09 RX ADMIN — FERROUS SULFATE TAB 325 MG (65 MG ELEMENTAL FE) 325 MG: 325 (65 FE) TAB at 08:59

## 2022-02-09 RX ADMIN — MULTIPLE VITAMINS W/ MINERALS TAB 1 TABLET: TAB at 08:59

## 2022-02-09 RX ADMIN — INSULIN LISPRO 60 UNITS: 100 INJECTION, SOLUTION INTRAVENOUS; SUBCUTANEOUS at 12:33

## 2022-02-09 RX ADMIN — PREGABALIN 75 MG: 75 CAPSULE ORAL at 08:58

## 2022-02-09 RX ADMIN — PREGABALIN 75 MG: 75 CAPSULE ORAL at 20:19

## 2022-02-09 RX ADMIN — SODIUM CHLORIDE, PRESERVATIVE FREE 10 ML: 5 INJECTION INTRAVENOUS at 08:59

## 2022-02-09 RX ADMIN — METAXALONE 800 MG: 800 TABLET ORAL at 08:59

## 2022-02-09 RX ADMIN — METAXALONE 800 MG: 800 TABLET ORAL at 20:20

## 2022-02-09 RX ADMIN — METAXALONE 800 MG: 800 TABLET ORAL at 16:07

## 2022-02-09 RX ADMIN — RIFAXIMIN 550 MG: 550 TABLET ORAL at 20:19

## 2022-02-09 RX ADMIN — INSULIN LISPRO 40 UNITS: 100 INJECTION, SOLUTION INTRAVENOUS; SUBCUTANEOUS at 08:56

## 2022-02-09 RX ADMIN — RIFAXIMIN 550 MG: 550 TABLET ORAL at 08:59

## 2022-02-09 RX ADMIN — METOCLOPRAMIDE 10 MG: 10 TABLET ORAL at 21:00

## 2022-02-09 RX ADMIN — HYDROXYZINE HYDROCHLORIDE 50 MG: 50 TABLET ORAL at 20:19

## 2022-02-09 RX ADMIN — ACETAMINOPHEN 650 MG: 325 TABLET, FILM COATED ORAL at 20:25

## 2022-02-09 RX ADMIN — MAGNESIUM OXIDE 400 MG (241.3 MG MAGNESIUM) TABLET 400 MG: TABLET at 20:19

## 2022-02-09 RX ADMIN — ZOLPIDEM TARTRATE 5 MG: 5 TABLET ORAL at 20:19

## 2022-02-09 RX ADMIN — SODIUM CHLORIDE 125 ML/HR: 9 INJECTION, SOLUTION INTRAVENOUS at 03:40

## 2022-02-09 RX ADMIN — HYDROXYZINE HYDROCHLORIDE 50 MG: 50 TABLET ORAL at 08:58

## 2022-02-09 RX ADMIN — INSULIN GLARGINE-YFGN 65 UNITS: 100 INJECTION, SOLUTION SUBCUTANEOUS at 08:56

## 2022-02-09 RX ADMIN — LEVOTHYROXINE SODIUM 25 MCG: 0.03 TABLET ORAL at 06:38

## 2022-02-09 RX ADMIN — METOCLOPRAMIDE 10 MG: 10 TABLET ORAL at 17:07

## 2022-02-09 RX ADMIN — METOCLOPRAMIDE 10 MG: 10 TABLET ORAL at 06:38

## 2022-02-09 RX ADMIN — SODIUM CHLORIDE 125 ML/HR: 9 INJECTION, SOLUTION INTRAVENOUS at 12:23

## 2022-02-09 RX ADMIN — INSULIN GLARGINE-YFGN 65 UNITS: 100 INJECTION, SOLUTION SUBCUTANEOUS at 20:18

## 2022-02-09 NOTE — THERAPY EVALUATION
Patient Name: Silvia Zabala  : 1960    MRN: [id number]                              Today's Date: 2022       Admit Date: 2022    Visit Dx:     ICD-10-CM ICD-9-CM   1. Near syncope  R55 780.2   2. New onset right bundle branch block (RBBB)  I45.10 426.4   3. Polypharmacy  Z79.899 V58.69   4. Acute UTI  N39.0 599.0   5. Hypothyroidism, unspecified type  E03.9 244.9     Patient Active Problem List   Diagnosis   • Cirrhosis of liver (HCC)   • Rheumatoid arthritis (HCC)   • Anxiety and depression   • Type 2 diabetes mellitus, uncontrolled, with neuropathy (HCC)   • Fibrositis   • Change in blood platelet count   • Pancytopenia (HCC)   • Hypersplenism   • Nausea & vomiting   • DUKES (nonalcoholic steatohepatitis)   • Hyperlipidemia   • Abdominal pain   • Hematemesis   • Ascites   • Portal hypertension (HCC)   • Secondary esophageal varices without bleeding (HCC)   • Esophageal varices with bleeding (HCC)   • Gastroparesis   • 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 (HCC)   • Diabetic ketoacidosis without coma associated with type 2 diabetes mellitus (HCC)   • Diabetic peripheral neuropathy (HCC)   • History of seizure disorder   • Hepatic encephalopathy (HCC)   • Thrombocytopenia (HCC)   • Fever   • Acute ITP (HCC)   • Degeneration of lumbosacral intervertebral disc   • Spondylosis without myelopathy   • UGI bleed   • Migraine without aura   • Urinary retention   • Uncontrolled diabetes mellitus with hyperglycemia (HCC)   • BRICE (obstructive sleep apnea)   • Gastroparesis   • Hyperammonemia (HCC)   • Hyponatremia   • Anemia   • Vitamin D insufficiency   • Hyperglycemia   • Iron deficiency anemia   • Adverse effect of iron and its compounds, sequela   • Hemorrhage of anus and rectum   • Vulvar lesion   • Acute upper GI bleed   • Acute blood loss anemia   • Acute hyperkalemia   • Labial cyst   • Transaminitis   • Hyperbilirubinemia   • Acute gastritis   • UTI (urinary tract infection), bacterial   • UTI (urinary  tract infection)   • Cholestatic pruritus   • Fatty liver disease, nonalcoholic   • Protein-calorie malnutrition, moderate (HCC)   • Hyperglycemia due to type 1 diabetes mellitus (HCC)   • Ascites   • Hospital discharge follow-up   • Near syncope     Past Medical History:   Diagnosis Date   • Anemia    • Anxiety    • Arthritis    • Broken shoulder     left shoulder    • Chromosomal abnormality     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   • Cirrhosis (HCC)    • Clostridium difficile infection    • Colon polyps    • Depression    • Diabetes mellitus (HCC)     Adult onset, insulin requiring   • Duodenal ulcer with hemorrhage    • Esophageal varices determined by endoscopy (HCC)    • Fibromyalgia    • Gallbladder disease    • Gastric varices    • Gastroparesis    • Glaucoma    • Granuloma annulare    • H/O B12 deficiency    • H/O Bleeding ulcer     And gastroesophageal varices   • H/O mixed connective tissue disorder    • Hemorrhoids    • Hiatal hernia    • History of transfusion     needs bendryl  flushes sensation and temp goes up   • Hx of being hospitalized     In [place] for GI bleeding from ulcer and varices   • Hyperlipidemia    • Migraine    • Mitral valve prolapse    • DUKES (nonalcoholic steatohepatitis) 11/2016   • Orthostatic hypotension 02/2019   • BRICE (obstructive sleep apnea)    • Pancreas divisum    • Pancytopenia (HCC)     Chronic, due to cirrhosis and hypersplenism   • Peptic ulcer disease     And esophageal varices   • PONV (postoperative nausea and vomiting)    • RA (rheumatoid arthritis) (HCC)    • Seizure disorder (HCC)     last 2003   • Seizures (HCC)    • Systemic lupus (HCC)    • TIA (transient ischemic attack) 1984     Past Surgical History:   Procedure Laterality Date   • BARTHOLIN CYST MARSUPIALIZATION Left 1/18/2021    Procedure: EXCISION OF LABIAL CYST;  Surgeon: Melinda Thakkar MD;  Location: [facility] OR;   Service: Obstetrics/Gynecology;  Laterality: Left;   • BLADDER REPAIR  1991   • CATARACT EXTRACTION     • CHOLECYSTECTOMY  1994   • COLONOSCOPY     • ENDOSCOPY N/A 11/7/2016    Procedure: ESOPHAGOGASTRODUODENOSCOPY WITH COLD POLYPECTOMY, BANDING,  AND CLIPS TIMES 2;  Surgeon: Rich Coleman MD;  Location: [facility] ENDOSCOPY;  Service:    • ENDOSCOPY N/A 12/22/2016    Procedure: ESOPHAGOGASTRODUODENOSCOPY WITH ESOPHAGEAL BANDING. 5 BANDS FIRED, 4 BANDS ADHERERD TO MUCOSA;  Surgeon: Rich Coleman MD;  Location: [facility] ENDOSCOPY;  Service:    • ENDOSCOPY N/A 2/15/2017    Procedure: ESOPHAGOGASTRODUODENOSCOPY AT BEDSIDE with esophageal varices banding (3);  Surgeon: Rich Coleman MD;  Location: [facility] ENDOSCOPY;  Service:    • ENDOSCOPY N/A 4/6/2017    Procedure: ESOPHAGOGASTRODUODENOSCOPY WITH ESOPHAGEAL VARICES BANDING x2;  Surgeon: Rich Coleman MD;  Location: [facility] ENDOSCOPY;  Service:    • ENDOSCOPY N/A 11/24/2017    Procedure: ESOPHAGOGASTRODUODENOSCOPY with biopsies;  Surgeon: Rich Coleman MD;  Location: [facility] ENDOSCOPY;  Service:    • ENDOSCOPY N/A 10/5/2018    Procedure: ESOPHAGOGASTRODUODENOSCOPY;  Surgeon: Rich Coleman MD;  Location: [facility] ENDOSCOPY;  Service: Gastroenterology   • ENDOSCOPY N/A 5/10/2019    Procedure: ESOPHAGOGASTRODUODENOSCOPY AT BEDSIDE WITH VARICEAL LIGATION;  Surgeon: Rich Coleman MD;  Location: [facility] ENDOSCOPY;  Service: Gastroenterology   • ENDOSCOPY N/A 6/28/2019    Procedure: ESOPHAGOGASTRODUODENOSCOPY WITH ESOPHAGEAL BANDING (3 BANDS);  Surgeon: Rich Coleman MD;  Location: [facility] ENDOSCOPY;  Service: Gastroenterology   • ENDOSCOPY N/A 4/3/2021    Procedure: ESOPHAGOGASTRODUODENOSCOPY WITH COLD BIOPSIES & BRUSHINGS;  Surgeon: Endy Vasquez MD;  Location: [facility] ENDOSCOPY;  Service: Gastroenterology;  Laterality: N/A;  PRE- N,V & DYSPHAGIA  POST- GASTRITIS, POSSIBLE FUNGAL ESOPHAGITIS   • ENDOSCOPY AND COLONOSCOPY  2014    Dr. Rich Coleman with  "findings of candida esophagitis   • EYE SURGERY     • HYSTERECTOMY  1986    partial   • JOINT REPLACEMENT     • KNEE SURGERY Right 1995    \"right knee recontstruction\"   • LIVER BIOPSY  01/2015   • MASS EXCISION     • STOMACH SURGERY     • TIPS PROCEDURE  2020    x2      General Information     [facility] Name 02/09/22 1602          OT Time and Intention    Document Type evaluation; discharge evaluation/summary  -     Mode of Treatment occupational therapy  -[facility] Name 02/09/22 1602          General Information    Patient Profile Reviewed yes  -     Prior Level of Function independent:; ADL's; all household mobility  pt reports being IND with ADLs and fxl mobility w/o AD. Does report falls  -     Existing Precautions/Restrictions fall  -     Barriers to Rehab none identified  -[facility] Name 02/09/22 1602          Living Environment    Lives With spouse  -[facility] Name 02/09/22 1602          Cognition    Orientation Status (Cognition) oriented x 4  -[facility] Name 02/09/22 1602          Safety Issues, Functional Mobility    Impairments Affecting Function (Mobility) endurance/activity tolerance  -           User Key  (r) = Recorded By, (t) = Taken By, (c) = Cosigned By    Initials Name Provider Type     Radha Cardenas OT Occupational Therapist                 Mobility/ADL's     [facility] Name 02/09/22 1604          Bed Mobility    Bed Mobility supine-sit; sit-supine  -     Supine-Sit [place] (Bed Mobility) supervision  -     Sit-Supine [place] (Bed Mobility) supervision  -     Assistive Device (Bed Mobility) head of bed elevated  -[facility] Name 02/09/22 1604          Transfers    Transfers toilet transfer  -     [place] Level (Toilet Transfer) standby assist  -     Assistive Device (Toilet Transfer) grab bars/safety frame  -[facility] Name 02/09/22 1604          Toilet Transfer    Type (Toilet Transfer) stand pivot/stand step  -[facility] Name 02/09/22 1604          Functional " Mobility    Functional Mobility- Ind. Level standby assist  -     Functional Mobility- Comment fxl ambulation around unit to simulate household and community distances, no LOB, slight DALE s/p ax.  -JW     Row Name 02/09/22 1604          Activities of Daily Living    BADL Assessment/Intervention toileting; lower body dressing  -JW     Row Name 02/09/22 1604          Toileting Assessment/Training    [place] Level (Toileting) toileting skills; adjust/manage clothing; perform perineal hygiene; supervision  -     Assistive Devices (Toileting) grab bar/safety frame  -     Position (Toileting) unsupported sitting; unsupported standing  -JW     Row Name 02/09/22 1604          Lower Body Dressing Assessment/Training    [place] Level (Lower Body Dressing) doff; don; socks; supervision  -     Position (Lower Body Dressing) edge of bed sitting  -           User Key  (r) = Recorded By, (t) = Taken By, (c) = Cosigned By    Initials Name Provider Type     Radha Cardenas OT Occupational Therapist               Obj/Interventions     Row Name 02/09/22 1605          Sensory Assessment (Somatosensory)    Sensory Assessment (Somatosensory) sensation intact  -[facility] 02/09/22 1605          Vision Assessment/Intervention    Visual Impairment/Limitations WNL  -JW     Row Name 02/09/22 1605          Range of Motion Comprehensive    General Range of Motion no range of motion deficits identified  -JW     Row Name 02/09/22 1605          Strength Comprehensive (MMT)    General Manual Muscle Testing (MMT) Assessment no strength deficits identified  -JW     Row Name 02/09/22 1605          Motor Skills    Motor Skills functional endurance  -     Functional Endurance fair+  -JW     Row Name 02/09/22 1605          Balance    Balance Assessment sitting static balance; sitting dynamic balance; standing static balance; standing dynamic balance  -     Static Sitting Balance WNL; unsupported; sitting, edge of bed  -      Dynamic Sitting Balance WNL; unsupported; sitting, edge of bed  -     Static Standing Balance WNL; unsupported; standing  -JW     Dynamic Standing Balance WNL; unsupported; standing  -JW     Balance Interventions sitting; standing; occupation based/functional task  -           User Key  (r) = Recorded By, (t) = Taken By, (c) = Cosigned By    Initials Name Provider Type     Radha Cardenas OT Occupational Therapist               Goals/Plan     Row Name 02/09/22 1612          Problem Specific Goal 1 (OT)    Problem Specific Goal 1 (OT) pt will be independent with ADLs and fxl mobility at d/c  -     Time Frame (Problem Specific Goal 1, OT) short term goal (STG); 2 weeks  -     Progress/Outcome (Problem Specific Goal 1, OT) goal met  -           User Key  (r) = Recorded By, (t) = Taken By, (c) = Cosigned By    Initials Name Provider Type     Radha Cardenas OT Occupational Therapist               Clinical Impression     Row Name 02/09/22 1606          Pain Assessment    Additional Documentation Pain Scale: Numbers Pre/Post-Treatment (Group)  -     Row Name 02/09/22 1606          Pain Scale: Numbers Pre/Post-Treatment    Pretreatment Pain Rating 0/10 - no pain  -     Posttreatment Pain Rating 0/10 - no pain  -     Row Name 02/09/22 1606          Plan of Care Review    Plan of Care Reviewed With patient  -     Outcome Summary Pt is a 62 y/o female admitted for near syncope episode while waiting for her  having heart sx. Hx of anxiety, cirrhosis, seizures, TIA. Pt lives with her  and reports being independent with ADLs and fxl mobility w/o AD, does report recent falls d/t LOB. She presents today Ox4, sititng EOB with nursing aide, pt requesting use of bathroom. Pt performs fxl amb into bathroom with SBA, no AD, slow pace but no LOB. She completes toilet TF and toileting tasks with SPV, LB dressing with set-upA. Performs fxl ambulation in room and hallway to simulate household and community  distances with SBA, no AD, no LOB but does demo slight SOB upon return to room. Pt returns to supine in bed and left with all needs in reach. No OT needs identified at this time, will sign off. Recommend d/c home  -     Row Name 02/09/22 1606          Therapy Assessment/Plan (OT)    Therapy Frequency (OT) evaluation only  -     Row Name 02/09/22 1606          Therapy Plan Review/Discharge Plan (OT)    Anticipated Discharge Disposition (OT) home  -     Row Name 02/09/22 1606          Positioning and Restraints    Pre-Treatment Position in bed  -     Post Treatment Position bed  -JW     In Bed call light within reach; encouraged to call for assist; fowlers; exit alarm on  -           User Key  (r) = Recorded By, (t) = Taken By, (c) = Cosigned By    Initials Name Provider Type    Radha Chau OT Occupational Therapist               Outcome Measures     Row Name 02/09/22 1612          How much help from another is currently needed...    Putting on and taking off regular lower body clothing? 4  -JW     Bathing (including washing, rinsing, and drying) 3  -JW     Toileting (which includes using toilet bed pan or urinal) 4  -JW     Putting on and taking off regular upper body clothing 4  -JW     Taking care of personal grooming (such as brushing teeth) 4  -JW     Eating meals 4  -JW     AM-PAC 6 Clicks Score (OT) 23  -     Row Name 02/09/22 1612          Functional Assessment    Outcome Measure Options AM-PAC 6 Clicks Daily Activity (OT)  -           User Key  (r) = Recorded By, (t) = Taken By, (c) = Cosigned By    Initials Name Provider Type    Radha Chau OT Occupational Therapist                Occupational Therapy Education                 Title: PT OT SLP Therapies (In Progress)     Topic: Occupational Therapy (In Progress)     Point: ADL training (Done)     Description:   Instruct learner(s) on proper safety adaptation and remediation techniques during self care or transfers.   Instruct in proper  use of assistive devices.              Learning Progress Summary           Patient Acceptance, E, VU by LAVINIA at 2/9/2022 1613    Comment: role of OT, home safety                   Point: Home exercise program (Not Started)     Description:   Instruct learner(s) on appropriate technique for monitoring, assisting and/or progressing therapeutic exercises/activities.              Learner Progress:  Not documented in this visit.          Point: Precautions (Done)     Description:   Instruct learner(s) on prescribed precautions during self-care and functional transfers.              Learning Progress Summary           Patient Acceptance, E, VU by LAVINIA at 2/9/2022 1613    Comment: role of OT, home safety                   Point: Body mechanics (Done)     Description:   Instruct learner(s) on proper positioning and spine alignment during self-care, functional mobility activities and/or exercises.              Learning Progress Summary           Patient Acceptance, E, VU by LAVINIA at 2/9/2022 1613    Comment: role of OT, home safety                               User Key     Initials Effective Dates Name Provider Type Discipline    LAVINIA 06/10/21 -  Radha Cardenas OT Occupational Therapist OT              OT Recommendation and Plan  Therapy Frequency (OT): evaluation only  Plan of Care Review  Plan of Care Reviewed With: patient  Outcome Summary: Pt is a 62 y/o female admitted for near syncope episode while waiting for her  having heart sx. Hx of anxiety, cirrhosis, seizures, TIA. Pt lives with her  and reports being independent with ADLs and fxl mobility w/o AD, does report recent falls d/t LOB. She presents today Ox4, sititng EOB with nursing aide, pt requesting use of bathroom. Pt performs fxl amb into bathroom with SBA, no AD, slow pace but no LOB. She completes toilet TF and toileting tasks with SPV, LB dressing with set-upA. Performs fxl ambulation in room and hallway to simulate household and community distances with  SBA, no AD, no LOB but does demo slight SOB upon return to room. Pt returns to supine in bed and left with all needs in reach. No OT needs identified at this time, will sign off. Recommend d/c home     Time Calculation:    Time Calculation- OT     Row Name 02/09/22 1614             Time Calculation- OT    OT Start Time 1430  -JW      OT Stop Time 1443  -JW      OT Time Calculation (min) 13 min  -JW      Total Timed Code Minutes- OT 8 minute(s)  -JW      OT Received On 02/09/22  -              Timed Charges    20329 - OT Self Care/Mgmt Minutes 8  -JW              Untimed Charges    OT Eval/Re-eval Minutes 5  -JW              Total Minutes    Timed Charges Total Minutes 8  -JW      Untimed Charges Total Minutes 5  -JW       Total Minutes 13  -JW            User Key  (r) = Recorded By, (t) = Taken By, (c) = Cosigned By    Initials Name Provider Type     Radha Cardenas OT Occupational Therapist              Therapy Charges for Today     Code Description Service Date Service Provider Modifiers Qty    [id number]  OT SELF CARE/MGMT/TRAIN EA 15 MIN 2/9/2022 Radha Cardenas OT GO 1    [id number]  OT EVAL LOW COMPLEXITY 2 2/9/2022 Radha Cardenas OT GO 1               Radha Cardenas OT  2/9/2022

## 2022-02-09 NOTE — PLAN OF CARE
Goal Outcome Evaluation:  Plan of Care Reviewed With: patient    Outcome Summary: Pt is a 60 y/o female admitted for near syncope episode while waiting for her  having heart sx. Hx of anxiety, cirrhosis, seizures, TIA. Pt lives with her  and reports being independent with ADLs and fxl mobility w/o AD, does report recent falls d/t LOB. She presents today Ox4, sititng EOB with nursing aide, pt requesting use of bathroom. Pt performs fxl amb into bathroom with SBA, no AD, slow pace but no LOB. She completes toilet TF and toileting tasks with SPV, LB dressing with set-upA. Performs fxl ambulation in room and hallway to simulate household and community distances with SBA, no AD, no LOB but does demo slight SOB upon return to room. Pt returns to supine in bed and left with all needs in reach. No OT needs identified at this time, will sign off. Recommend d/c home  Therapist used appropriate personal protective equipment including mask, gloves, and eye protection.  Hand hygiene was completed before and after therapy session.

## 2022-02-09 NOTE — PLAN OF CARE
Goal Outcome Evaluation:  Plan of Care Reviewed With: patient           Outcome Summary: Pt is a 62 yo female admitted with near syncopal event in waiting room prior to 's heart surgery. Per chart, pt with hx of seizures, anxiety and TIA. pt reports living with her , is IND ADLs and mobility without assistive device use but reports recent falls due to loss of balance. Upon exam, pt sititng EOB about to use commode with nsg aide. Pt completed transfers and gait with cga/sba without AD. Pt demonstrates endurance deficits and hx of balance impairments. PT plans to follow up for one more session friday to ensure mobility is OK to d/c home.

## 2022-02-09 NOTE — CONSULTS
"Adult Nutrition  Assessment/PES    Patient Name:  Silvia Zabala  YOB: 1960  MRN: [id number]  Admit Date:  2/8/2022    Assessment Date:  2/9/2022    Comments:  Nutrition consult from nursing admission screen. Pt with hx cirrhosis and reports poor appetite \"Nothing sounds good to eat\".  Weight fluctuates with fluid status.  Drinks boost at home when doesn't eat well. She would like to get it while here. Will add with meals. Discussed alternate food options as well. Encouraged po, will follow.     Reason for Assessment     Row Name 02/09/22 1302          Reason for Assessment    Reason For Assessment identified at risk by screening criteria; nurse/nurse practitioner consult     Diagnosis other (see comments)  Near syncope, thrombocytopenia, Hx Cirrhosis, RA, DM     Identified At Risk by Screening Criteria MST SCORE 2+                Nutrition/Diet History     Row Name 02/09/22 1304          Nutrition/Diet History    Typical Food/Fluid Intake appetite not very good.     Food Allergies other (see comments)  coconut                Anthropometrics     Row Name 02/09/22 1305 02/09/22 1222       Anthropometrics    Height 175.3 cm (69\") 175.3 cm (69\")    Weight -- 85.3 kg (188 lb)       Admit Weight    Admit Weight 85.3 kg (188 lb) --       Ideal Body Weight (IBW)    Ideal Body Weight (IBW) (kg) 66.43 66.43    % Ideal Body Weight -- 128.36    % of Ideal Body Weight Assessment --  128% IBW --       Body Mass Index (BMI)    BMI (kg/m2) -- 27.82    BMI Assessment BMI 25-29.9: overweight  BMI 27.7 --    Row Name 02/09/22 0551          Anthropometrics    Weight 85.5 kg (188 lb 7.9 oz)                Labs/Tests/Procedures/Meds     Row Name 02/09/22 1305          Labs/Procedures/Meds    Lab Results Reviewed reviewed, pertinent     Lab Results Comments Glu, Hgba1c, plts, wbc, h/h            Diagnostic Tests/Procedures    Diagnostic Test/Procedure Reviewed reviewed, pertinent     Diagnostic Test/Procedures Comments " vasc studies, SLP eval            Medications    Pertinent Medications Reviewed reviewed, pertinent     Pertinent Medications Comments Abx, Vit D, iron, lantus, admelog, keppra, synthroid, Mag-ox, reglan, MVI, scoplamine, B12, zinc, IVF                Physical Findings     Row Name 02/09/22 1307          Physical Findings    Overall Physical Appearance overweight     Oral/Mouth Cavity poor dentition     Skin other (see comments)  intact                  Nutrition Prescription Ordered     Row Name 02/09/22 1307          Nutrition Prescription PO    Common Modifiers Cardiac                Evaluation of Received Nutrient/Fluid Intake     Row Name 02/09/22 1308          Fluid Intake Evaluation    IV Fluid (mL) 3000            PO Evaluation    % PO Intake 25% lunch               Problem/Interventions:   Problem 1     Row Name 02/09/22 1312          Nutrition Diagnoses Problem 1    Problem 1 Predicted Suboptimal Intake     Etiology (related to) Medical Diagnosis     Hepatic Cirrhosis     Signs/Symptoms (evidenced by) Report of Mnimal PO Intake                Intervention Goal     Row Name 02/09/22 1313          Intervention Goal    General Maintain nutrition; Disease management/therapy; Reduce/improve symptoms; Improved nutrition related lab(s); Meet nutritional needs for age/condition     PO Tolerate PO; Increase intake; PO intake (%)     PO Intake % 75 %     Weight No significant weight loss                Nutrition Intervention     Row Name 02/09/22 1313          Nutrition Intervention    RD/Tech Action Interview for preference; Follow Tx progress; Care plan reviewd; Encourage intake; Recommend/ordered; Advise available snack; Advise alternate selection     Recommended/Ordered Supplement                Nutrition Prescription     Row Name 02/09/22 1313          Nutrition Prescription PO    PO Prescription Begin/change supplement     Supplement Boost Plus     Supplement Frequency 3 times a day     New PO Prescription  Ordered? Yes                Education/Evaluation     Row Name 02/09/22 1313          Education    Education Will Instruct as appropriate            Monitor/Evaluation    Monitor Skin status; Per protocol; I&O; Symptoms; PO intake; Supplement intake; Pertinent labs; Weight                 Electronically signed by:  Sophia Farrar RD  02/09/22 13:13 EST

## 2022-02-09 NOTE — CONSULTS
Patient Name: Silvia Zabala  :1960  61 y.o.    Date of Admission: 2022  Date of Consultation:  22  Encounter Provider: Gildardo Smith III, MD  Place of Service: [facility] CARDIOLOGY  Referring Provider: Reva Angela MD  Patient Care Team:  Kulwant Martínez APRN as PCP - General (Internal Medicine)  Sukhdeep Mcdowell MD as Consulting Physician (Hematology and Oncology)  Merary Burnett MD as Consulting Physician (Endocrinology)  Kimberly Gray MD as Consulting Physician (Gastroenterology)  Joe Munoz MD as Consulting Physician (Rheumatology)  Melinda Thakkar MD as Consulting Physician (Obstetrics and Gynecology)  Silvestre Montes MD PhD as Consulting Physician (Gastroenterology)  Mina Gonzalez MD as Referring Physician (Hospitalist)      Chief complaint: near syncope    Reason for Consult: syncope     History of Present Illness:     Ms Zabala is a 61 year old female with history of hypertension, diabetes, Wong, cirrhosis, portal hypertension with TIPS in place, seizures and RA who presented to [facility] ED 22 with reports of a near syncopal episode while waiting in our OR waiting area ( was having open heart surgery).     She stated that she had not been feeling well for the past 3 days with increasing weakness, malaise, diminished appetite, and worsening chronic nausea.  She had abdominal pain with nonbloody diarrhea.  Creatinine was elevated on arrival.  This was new compared to labs from January 10.    She was also diagnosed with a UTI.    She did not have any feeling of palpitations or tachycardia, no anterior chest pain.    In the emergency room TSH was elevated 13.3.    Patient had abnormal liver function tests with an ALT of 37, AST 35, alkaline phosphatase of 118, total bilirubin of 1.4.  Somewhat similar to labs obtained on 10 January.    We were asked to see her to obtain an echocardiogram assess  for possible cardiac etiologies for near syncope.      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   • Cirrhosis (HCC)    • Clostridium difficile infection    • Colon polyps    • Depression    • Diabetes mellitus (HCC)     Adult onset, insulin requiring   • Duodenal ulcer with hemorrhage    • Esophageal varices determined by endoscopy (HCC)    • Fibromyalgia    • Gallbladder disease    • Gastric varices    • Gastroparesis    • Glaucoma    • Granuloma annulare    • H/O B12 deficiency    • H/O Bleeding ulcer     And gastroesophageal varices   • H/O mixed connective tissue disorder    • Hemorrhoids    • Hiatal hernia    • History of transfusion     needs bendryl  flushes sensation and temp goes up   • Hx of being hospitalized     In [place] for GI bleeding from ulcer and varices   • Hyperlipidemia    • Migraine    • Mitral valve prolapse    • DUKES (nonalcoholic steatohepatitis) 11/2016   • Orthostatic hypotension 02/2019   • BRICE (obstructive sleep apnea)    • Pancreas divisum    • Pancytopenia (HCC)     Chronic, due to cirrhosis and hypersplenism   • Peptic ulcer disease     And esophageal varices   • PONV (postoperative nausea and vomiting)    • RA (rheumatoid arthritis) (HCC)    • Seizure disorder (HCC)     last 2003   • Seizures (HCC)    • Systemic lupus (HCC)    • TIA (transient ischemic attack) 1984       Past Surgical History:   Procedure Laterality Date   • BARTHOLIN CYST MARSUPIALIZATION Left 1/18/2021    Procedure: EXCISION OF LABIAL CYST;  Surgeon: Melinda Thakkar MD;  Location: [facility];  Service: Obstetrics/Gynecology;  Laterality: Left;   • BLADDER REPAIR  1991   • CATARACT EXTRACTION     • CHOLECYSTECTOMY  1994   • COLONOSCOPY     • ENDOSCOPY N/A 11/7/2016    Procedure:  "ESOPHAGOGASTRODUODENOSCOPY WITH COLD POLYPECTOMY, BANDING,  AND CLIPS TIMES 2;  Surgeon: Rich Coleman MD;  Location: [facility] ENDOSCOPY;  Service:    • ENDOSCOPY N/A 12/22/2016    Procedure: ESOPHAGOGASTRODUODENOSCOPY WITH ESOPHAGEAL BANDING. 5 BANDS FIRED, 4 BANDS ADHERERD TO MUCOSA;  Surgeon: Rich Coleman MD;  [facility];  Service:    • ENDOSCOPY N/A 2/15/2017    Procedure: ESOPHAGOGASTRODUODENOSCOPY AT BEDSIDE with esophageal varices banding (3);  Surgeon: Rich Coleman MD;  [facility];  Service:    • ENDOSCOPY N/A 4/6/2017    Procedure: ESOPHAGOGASTRODUODENOSCOPY WITH ESOPHAGEAL VARICES BANDING x2;  Surgeon: Rich Coleman MD;  Location: [facility] ENDOSCOPY;  Service:    • ENDOSCOPY N/A 11/24/2017    Procedure: ESOPHAGOGASTRODUODENOSCOPY with biopsies;  Surgeon: Rich Coleman MD;  Location: [facility] ENDOSCOPY;  Service:    • ENDOSCOPY N/A 10/5/2018    Procedure: ESOPHAGOGASTRODUODENOSCOPY;  Surgeon: Rich Coleman MD;  Location: [facility] ENDOSCOPY;  Service: Gastroenterology   • ENDOSCOPY N/A 5/10/2019    Procedure: ESOPHAGOGASTRODUODENOSCOPY AT BEDSIDE WITH VARICEAL LIGATION;  Surgeon: Rich Coleman MD;  Location: [facility] ENDOSCOPY;  Service: Gastroenterology   • ENDOSCOPY N/A 6/28/2019    Procedure: ESOPHAGOGASTRODUODENOSCOPY WITH ESOPHAGEAL BANDING (3 BANDS);  Surgeon: Rich Coleman MD;  Location: [facility] ENDOSCOPY;  Service: Gastroenterology   • ENDOSCOPY N/A 4/3/2021    Procedure: ESOPHAGOGASTRODUODENOSCOPY WITH COLD BIOPSIES & BRUSHINGS;  Surgeon: nEdy Vasquez MD;  Location: [facility] ENDOSCOPY;  Service: Gastroenterology;  Laterality: N/A;  PRE- N,V & DYSPHAGIA  POST- GASTRITIS, POSSIBLE FUNGAL ESOPHAGITIS   • ENDOSCOPY AND COLONOSCOPY  2014    Dr. Rich Coleman with findings of candida esophagitis   • EYE SURGERY     • HYSTERECTOMY  1986    partial   • JOINT REPLACEMENT     • KNEE SURGERY Right 1995    \"right knee recontstruction\"   • LIVER BIOPSY  " 01/2015   • MASS EXCISION     • STOMACH SURGERY     • TIPS PROCEDURE  2020    x2         Prior to Admission medications    Medication Sig Start Date End Date Taking? Authorizing Provider   ALPRAZolam (XANAX) 1 MG tablet Take 0.5-1 mg by mouth As Needed for Anxiety. 9/27/21  Yes Prisca Michelle MD   Cholecalciferol (Vitamin D3) 25 MCG (1000 UT) capsule Take 1,000 Units by mouth Daily.   Yes Pirsca Michelle MD   Docusate Sodium (COLACE PO) Take 2 capsules by mouth every night at bedtime.   Yes ProviderPrisca MD   ferrous sulfate 325 (65 FE) MG tablet TAKE ONE TABLET BY MOUTH TWICE A DAY  Patient taking differently: Take 325 mg by mouth Daily With Breakfast. 7/29/19  Yes Sukhdeep Mcdowell MD   FLUoxetine (PROzac) 10 MG capsule Take 20 mg by mouth Daily.   Yes Prisca Michelle MD   hydrOXYzine (ATARAX) 25 MG tablet Take 2 tablets by mouth Every Night.  Patient taking differently: Take 50 mg by mouth 2 (Two) Times a Day. 3/17/21  Yes Kimberly Gray MD   insulin detemir (LEVEMIR) 100 UNIT/ML injection Inject 65 Units under the skin into the appropriate area as directed 2 (Two) Times a Day.   Yes ProviderPrisca MD   levETIRAcetam (KEPPRA) 500 MG tablet TAKE ONE TABLET BY MOUTH TWICE A DAY  Patient taking differently: Take 500 mg by mouth 2 (Two) Times a Day. 1/31/22  Yes Kulwant Martínez APRN   Magnesium Oxide 400 MG capsule Take 400 mg by mouth Every Night. 2/12/21  Yes Kulwant Martínez APRN   metaxalone (Skelaxin) 800 MG tablet Take 800 mg by mouth 3 (Three) Times a Day.   Yes Prisca Michelle MD   metoclopramide (REGLAN) 10 MG tablet Take 1 tablet by mouth 4 (Four) Times a Day Before Meals & at Bedtime. 3/17/21  Yes Kimberly Gray MD   Multiple Vitamins-Minerals (MULTIVITAMIN WITH MINERALS) tablet tablet Take 1 tablet by mouth Daily.   Yes Prisca Michelle MD   Narcan 4 MG/0.1ML nasal spray 1 spray into the nostril(s) as directed by provider As Needed. 9/23/21  Yes Miguel Angel  "MD Prisca   NovoLOG FlexPen 100 UNIT/ML solution pen-injector sc pen INJECT 60 UNITS THREE TIMES A DAY WITH MELS  Patient taking differently: Inject 60 Units under the skin into the appropriate area as directed 3 (Three) Times a Day With Meals. 2/7/22  Yes Merary Burnett MD   Oxaydo 7.5 MG tablet Take 7.5 mg by mouth 2 (Two) Times a Day. 9/23/21  Yes Prisca Michelle MD   polyethylene glycol (MIRALAX) powder Take 17 g by mouth Daily As Needed.   Yes Prisca Michelle MD   pregabalin (LYRICA) 75 MG capsule Take 75 mg by mouth 2 (Two) Times a Day.   Yes Prisca Michelle MD   promethazine (PHENERGAN) 25 MG tablet Take 25 mg by mouth Every 8 (Eight) Hours As Needed for Nausea or Vomiting.   Yes Prisca Michelle MD   riFAXIMin (Xifaxan) 550 MG tablet Take 1 tablet by mouth 2 (Two) Times a Day. 3/26/21  Yes Kimberly Gray MD   Scopolamine 1 MG/3DAYS patch Place 1 patch on the skin as directed by provider Every 3 (Three) Days. 1/13/22 2/12/22 Yes Prisca Michelle MD   vitamin B-12 (CYANOCOBALAMIN) 1000 MCG tablet Take 1,000 mcg by mouth Daily.   Yes Prisca Michelle MD   zinc gluconate 50 MG tablet Take 50 mg by mouth Daily. 6/28/21 6/28/22 Yes Prisca Michelle MD   zolpidem (AMBIEN) 5 MG tablet Take 5 mg by mouth Every Night.   Yes Prisca Michelle MD   sertraline (ZOLOFT) 50 MG tablet Take 50 mg by mouth Daily. 7/26/21 7/26/22  Prisca Michelle MD   spironolactone (ALDACTONE) 100 MG tablet TAKE ONE TABLET BY MOUTH DAILY  Patient taking differently: Take 100 mg by mouth Daily As Needed (swelling). For swelling 2/4/19   Rich Coleman MD       Allergies   Allergen Reactions   • Albuterol Anaphylaxis   • Imitrex [Sumatriptan] Anaphylaxis   • Tramadol Nausea Only, Other (See Comments) and GI Intolerance     Per pt causes her \"palsy, meaning shaking and tremors\"    • Coconut (Cocos Nucifera) Allergy Skin Test Nausea And Vomiting   • Nsaids Other (See Comments)     Told " by MD not to take due to liver problems   • Tylenol [Acetaminophen] Other (See Comments)     Has liver problems and told by MD to not take   • Zofran [Ondansetron Hcl] Other (See Comments)     Pt states does not work to correct nausea and vomiting.    • Codeine Nausea Only, Rash and GI Intolerance   • Lactulose Nausea And Vomiting and Other (See Comments)     Severe abdominal pain   • Quinine Derivatives Nausea And Vomiting       Social History     Socioeconomic History   • Marital status:      Spouse name: Jose   Tobacco Use   • Smoking status: Former Smoker     Packs/day: 1.00     Years: 4.00     Pack years: 4.00     Types: Cigarettes     Quit date: 2015     Years since quittin.1   • Smokeless tobacco: Never Used   Vaping Use   • Vaping Use: Never used   Substance and Sexual Activity   • Alcohol use: No   • Drug use: Never   • Sexual activity: Defer       Family History   Problem Relation Age of Onset   • Liver disease Other    • Depression Other    • Heart disease Other    • Hypertension Other    • Diabetes Other    • Breast cancer Other    • Brain cancer Other    • Uterine cancer Other    • Colon cancer Other    • Leukemia Other    • Colon cancer Maternal Aunt    • Hypertension Mother    • Diabetes type II Mother    • Rheum arthritis Mother    • Liver disease Mother         DUKES   • Heart disease Father    • Diabetes type II Father    • Diabetes type II Sister    • Lupus Sister    • Malig Hyperthermia Neg Hx        REVIEW OF SYSTEMS:   All systems reviewed.  Pertinent positives identified in HPI.  All other systems are negative.      Objective:     Vitals:    22 1917 22 2302 22 0551 22 0709   BP: 135/68 137/70  116/57   BP Location: Left arm Left arm  Left arm   Patient Position: Sitting Sitting  Lying   Pulse: 75 81  72   Resp: 18 18  18   Temp: 97.6 °F (36.4 °C)   98.2 °F (36.8 °C)   TempSrc: Oral   Oral   SpO2: 100% 94%  94%   Weight:   85.5 kg (188 lb 7.9 oz)     Height:         Body mass index is 29.52 kg/m².    Physical Exam:  General Appearance:    Alert, cooperative, in no acute distress   Head:    Normocephalic, without obvious abnormality   Eyes:            Lids and lashes normal, conjunctivae and sclerae normal, no icterus, no pallor, corneas clear   Ears:    Ears appear intact with no abnormalities noted   Throat:   No oral lesions, oral mucosa moist   Neck:   No adenopathy, supple, trachea midline, no thyromegaly, no carotid bruit, no JVD   Back:     No kyphosis present, no erythema or scars, no tenderness to palpation    Lungs:     Clear to auscultation,respirations regular, even and unlabored    Heart:    Regular rhythm and normal rate, normal S1 and S2, no murmur, no gallop, no rub, no click   Chest Wall:    No abnormalities observed   Abdomen:     Normal bowel sounds, no masses, no organomegaly, soft        non-tender, non-distended, no guarding   Extremities:   Moves all extremities well, no edema, no cyanosis, no redness   Pulses:  Bilateral carotids brisk   Skin:  Psychiatric:   No bleeding or rash    Alert and oriented, normal mood and affect         Lab Review:     Results from last 7 days   Lab Units 02/09/22  0331 02/08/22  0956 02/08/22  0956   SODIUM mmol/L 138   < > 136   POTASSIUM mmol/L 4.1   < > 3.4*   CHLORIDE mmol/L 108*   < > 103   CO2 mmol/L 19.2*   < > 22.4   BUN mg/dL 15   < > 20   CREATININE mg/dL 0.84   < > 1.17*   CALCIUM mg/dL 7.9*   < > 8.9   BILIRUBIN mg/dL  --   --  1.4*   ALK PHOS U/L  --   --  118*   ALT (SGPT) U/L  --   --  37*   AST (SGOT) U/L  --   --  35*   GLUCOSE mg/dL 293*   < > 168*    < > = values in this interval not displayed.     Results from last 7 days   Lab Units 02/08/22  1228 02/08/22  0956   TROPONIN T ng/mL <0.010 <0.010     Results from last 7 days   Lab Units 02/09/22  0331   WBC 10*3/mm3 2.51*   HEMOGLOBIN g/dL 10.8*   HEMATOCRIT % 33.0*   PLATELETS 10*3/mm3 60*         Results from last 7 days   Lab Units  02/08/22  0956   MAGNESIUM mg/dL 1.8                  EKG 2/8/22    Previous EKG 4/29/21    Results for orders placed during the hospital encounter of 02/08/22    Adult Transthoracic Echo Complete W/ Cont if Necessary Per Protocol    Interpretation Summary  · Estimated left ventricular EF = 70% Left ventricular systolic function is normal.  · Left ventricular diastolic function was normal.  · Left atrial volume is mildly increased.      I personally viewed and interpreted the patient's EKG/Telemetry data.      Current Facility-Administered Medications:   •  acetaminophen (TYLENOL) tablet 650 mg, 650 mg, Oral, Q4H PRN, Reva Angela MD  •  ALPRAZolam (XANAX) tablet 0.5 mg, 0.5 mg, Oral, BID PRN, Reva Angela MD  •  cefTRIAXone (ROCEPHIN) 1 g in sodium chloride 0.9 % 100 mL IVPB-VTB, 1 g, Intravenous, Q24H, Reva Angela MD  •  cholecalciferol (VITAMIN D3) tablet 1,000 Units, 1,000 Units, Oral, Daily, Reva Angela MD  •  dextrose (D50W) (25 g/50 mL) IV injection 25 g, 25 g, Intravenous, Q15 Min PRN, Reva Angela MD  •  dextrose (GLUTOSE) oral gel 15 g, 15 g, Oral, Q15 Min PRN, Reva Angela MD  •  enoxaparin (LOVENOX) syringe 40 mg, 40 mg, Subcutaneous, Nightly, Reva Angela MD  •  ferrous sulfate tablet 325 mg, 325 mg, Oral, Daily With Breakfast, Reva Angela MD, 325 mg at 02/09/22 0859  •  FLUoxetine (PROzac) capsule 20 mg, 20 mg, Oral, Daily, Reva Angela MD  •  glucagon (human recombinant) (GLUCAGEN DIAGNOSTIC) injection 1 mg, 1 mg, Intramuscular, Q15 Min PRN, Rvea Angela MD  •  hydrOXYzine (ATARAX) tablet 50 mg, 50 mg, Oral, BID, Reva Angela MD, 50 mg at 02/09/22 0858  •  influenza vac split quad (FLUZONE,FLUARIX,AFLURIA,FLULAVAL) injection 0.5 mL, 0.5 mL, Intramuscular, During Hospitalization, Reva Angela MD  •  insulin glargine (LANTUS, SEMGLEE) injection 65 Units, 65 Units, Subcutaneous,  Q12H, Reva Angela MD, 65 Units at 02/09/22 0856  •  insulin lispro (ADMELOG) injection 0-9 Units, 0-9 Units, Subcutaneous, TID With Meals, Reva Angela MD  •  insulin lispro (ADMELOG) injection 60 Units, 60 Units, Subcutaneous, TID With Meals, Reva Angela MD, 40 Units at 02/09/22 0856  •  levETIRAcetam (KEPPRA) tablet 500 mg, 500 mg, Oral, BID, Reva Angela MD, 500 mg at 02/09/22 0859  •  levothyroxine (SYNTHROID, LEVOTHROID) tablet 25 mcg, 25 mcg, Oral, Q AM, Reva Angela MD, 25 mcg at 02/09/22 0638  •  magnesium oxide (MAG-OX) tablet 400 mg, 400 mg, Oral, Nightly, Reva Angela MD, 400 mg at 02/08/22 2037  •  melatonin tablet 3 mg, 3 mg, Oral, Nightly PRN, Reva Angela MD  •  metaxalone (SKELAXIN) tablet 800 mg, 800 mg, Oral, TID, Reva Angela MD, 800 mg at 02/09/22 0859  •  metoclopramide (REGLAN) tablet 10 mg, 10 mg, Oral, 4x Daily AC & at Bedtime, Reva Angela MD, 10 mg at 02/09/22 0638  •  multivitamin with minerals 1 tablet, 1 tablet, Oral, Daily, Reva Angela MD, 1 tablet at 02/09/22 0859  •  nitroglycerin (NITROSTAT) SL tablet 0.4 mg, 0.4 mg, Sublingual, Q5 Min PRN, Reva Angela MD  •  ondansetron (ZOFRAN) tablet 4 mg, 4 mg, Oral, Q6H PRN **OR** ondansetron (ZOFRAN) injection 4 mg, 4 mg, Intravenous, Q6H PRN, Reva Angela MD  •  oxyCODONE (ROXICODONE) immediate release tablet 5 mg, 5 mg, Oral, Q6H PRN, Reva Angela MD  •  pregabalin (LYRICA) capsule 75 mg, 75 mg, Oral, Q12H, Reva Angela MD, 75 mg at 02/09/22 0858  •  riFAXIMin (XIFAXAN) tablet 550 mg, 550 mg, Oral, BID, Reva Angela MD, 550 mg at 02/09/22 0859  •  scopolamine patch 1 mg/72 hr, 1 patch, Transdermal, Once, Donaldo Mendoza MD, 1 patch at 02/08/22 1210  •  sertraline (ZOLOFT) tablet 50 mg, 50 mg, Oral, Daily, Reva Angela MD  •  [COMPLETED] Insert peripheral IV, , , Once **AND** sodium  chloride 0.9 % flush 10 mL, 10 mL, Intravenous, PRN, Donaldo Mendoza MD, 10 mL at 02/09/22 0859  •  sodium chloride 0.9 % infusion, 125 mL/hr, Intravenous, Continuous, Donaldo Mendoza MD, Last Rate: 125 mL/hr at 02/09/22 0904, 125 mL/hr at 02/09/22 0904  •  vitamin B-12 (CYANOCOBALAMIN) tablet 1,000 mcg, 1,000 mcg, Oral, Daily, Reva Angela MD, 1,000 mcg at 02/08/22 2037  •  zinc sulfate (ZINCATE) capsule 220 mg, 220 mg, Oral, Daily, Reva Angela MD, 220 mg at 02/08/22 2037  •  zolpidem (AMBIEN) tablet 5 mg, 5 mg, Oral, Nightly, Reva Angela MD, 5 mg at 02/08/22 2154    Assessment and Plan:       Active Hospital Problems    Diagnosis  POA   • Near syncope [R55]  Yes   • UTI (urinary tract infection) [N39.0]  Yes   • Diabetic peripheral neuropathy (HCC) [E11.42]  Yes   • 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     1.  Near syncope-EKG without acute change, troponin is negative, echocardiogram is normal, telemetry monitor shows sinus rhythm, no evidence of a cardiac etiology, patient does not meet guideline recommendations for any additional cardiac testing at this time.  2.  Abnormal TSH  3.  Abnormal liver function test, no significant change from prior.  Ultrasound test August 2021 showed cirrhotic appearing liver with a patent TIPS shunt.  Findings consistent with portal hypertension were also noted.  4.  Right bundle branch block, prior EKGs have intermittently noted this, no further evaluation is necessary    We will sign off, please call if we can help in any way    Gildardo Smith III, MD  02/09/22  10:48 EST

## 2022-02-09 NOTE — PLAN OF CARE
Goal Outcome Evaluation:    Progress: improving  Outcome Summary: Vitals stable. No c/o pain, nausea, or lightheadedness. IVF continued. Ambulating to bathroom with standby assistance. Pt to go for Echo and Duplex Carotid US. Pt stable and needs met at this time.

## 2022-02-09 NOTE — PLAN OF CARE
Goal Outcome Evaluation:  Plan of Care Reviewed With: patient        Progress: improving  Outcome Summary: VSS. IVF 125ml/started. Pt had some lightheadedness whne being admitted. Will continue to monitor.

## 2022-02-09 NOTE — THERAPY EVALUATION
Patient Name: Silvia Zabala  : 1960    MRN: [id number]                              Today's Date: 2022       Admit Date: 2022    Visit Dx:     ICD-10-CM ICD-9-CM   1. Near syncope  R55 780.2   2. New onset right bundle branch block (RBBB)  I45.10 426.4   3. Polypharmacy  Z79.899 V58.69   4. Acute UTI  N39.0 599.0   5. Hypothyroidism, unspecified type  E03.9 244.9     Patient Active Problem List   Diagnosis   • Cirrhosis of liver (HCC)   • Rheumatoid arthritis (HCC)   • Anxiety and depression   • Type 2 diabetes mellitus, uncontrolled, with neuropathy (HCC)   • Fibrositis   • Change in blood platelet count   • Pancytopenia (HCC)   • Hypersplenism   • Nausea & vomiting   • DUKES (nonalcoholic steatohepatitis)   • Hyperlipidemia   • Abdominal pain   • Hematemesis   • Ascites   • Portal hypertension (HCC)   • Secondary esophageal varices without bleeding (HCC)   • Esophageal varices with bleeding (HCC)   • Gastroparesis   • 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 (HCC)   • Diabetic ketoacidosis without coma associated with type 2 diabetes mellitus (HCC)   • Diabetic peripheral neuropathy (HCC)   • History of seizure disorder   • Hepatic encephalopathy (HCC)   • Thrombocytopenia (HCC)   • Fever   • Acute ITP (HCC)   • Degeneration of lumbosacral intervertebral disc   • Spondylosis without myelopathy   • UGI bleed   • Migraine without aura   • Urinary retention   • Uncontrolled diabetes mellitus with hyperglycemia (HCC)   • BRICE (obstructive sleep apnea)   • Gastroparesis   • Hyperammonemia (HCC)   • Hyponatremia   • Anemia   • Vitamin D insufficiency   • Hyperglycemia   • Iron deficiency anemia   • Adverse effect of iron and its compounds, sequela   • Hemorrhage of anus and rectum   • Vulvar lesion   • Acute upper GI bleed   • Acute blood loss anemia   • Acute hyperkalemia   • Labial cyst   • Transaminitis   • Hyperbilirubinemia   • Acute gastritis   • UTI (urinary tract infection), bacterial   • UTI (urinary  tract infection)   • Cholestatic pruritus   • Fatty liver disease, nonalcoholic   • Protein-calorie malnutrition, moderate (HCC)   • Hyperglycemia due to type 1 diabetes mellitus (HCC)   • Ascites   • Hospital discharge follow-up   • Near syncope     Past Medical History:   Diagnosis Date   • Anemia    • Anxiety    • Arthritis    • Broken shoulder     left shoulder    • Chromosomal abnormality     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   • Cirrhosis (HCC)    • Clostridium difficile infection    • Colon polyps    • Depression    • Diabetes mellitus (HCC)     Adult onset, insulin requiring   • Duodenal ulcer with hemorrhage    • Esophageal varices determined by endoscopy (HCC)    • Fibromyalgia    • Gallbladder disease    • Gastric varices    • Gastroparesis    • Glaucoma    • Granuloma annulare    • H/O B12 deficiency    • H/O Bleeding ulcer     And gastroesophageal varices   • H/O mixed connective tissue disorder    • Hemorrhoids    • Hiatal hernia    • History of transfusion     needs bendryl  flushes sensation and temp goes up   • Hx of being hospitalized     In [place] for GI bleeding from ulcer and varices   • Hyperlipidemia    • Migraine    • Mitral valve prolapse    • DUKES (nonalcoholic steatohepatitis) 11/2016   • Orthostatic hypotension 02/2019   • BRICE (obstructive sleep apnea)    • Pancreas divisum    • Pancytopenia (HCC)     Chronic, due to cirrhosis and hypersplenism   • Peptic ulcer disease     And esophageal varices   • PONV (postoperative nausea and vomiting)    • RA (rheumatoid arthritis) (HCC)    • Seizure disorder (HCC)     last 2003   • Seizures (HCC)    • Systemic lupus (HCC)    • TIA (transient ischemic attack) 1984     Past Surgical History:   Procedure Laterality Date   • BARTHOLIN CYST MARSUPIALIZATION Left 1/18/2021    Procedure: EXCISION OF LABIAL CYST;  Surgeon: Melinda Thakkar MD;  Location: [facility] OR;   Service: Obstetrics/Gynecology;  Laterality: Left;   • BLADDER REPAIR  1991   • CATARACT EXTRACTION     • CHOLECYSTECTOMY  1994   • COLONOSCOPY     • ENDOSCOPY N/A 11/7/2016    Procedure: ESOPHAGOGASTRODUODENOSCOPY WITH COLD POLYPECTOMY, BANDING,  AND CLIPS TIMES 2;  Surgeon: Rich Coleman MD;  Location: [facility] ENDOSCOPY;  Service:    • ENDOSCOPY N/A 12/22/2016    Procedure: ESOPHAGOGASTRODUODENOSCOPY WITH ESOPHAGEAL BANDING. 5 BANDS FIRED, 4 BANDS ADHERERD TO MUCOSA;  Surgeon: Rich Coleman MD;  Location: [facility] ENDOSCOPY;  Service:    • ENDOSCOPY N/A 2/15/2017    Procedure: ESOPHAGOGASTRODUODENOSCOPY AT BEDSIDE with esophageal varices banding (3);  Surgeon: Rich Coleman MD;  Location: [facility] ENDOSCOPY;  Service:    • ENDOSCOPY N/A 4/6/2017    Procedure: ESOPHAGOGASTRODUODENOSCOPY WITH ESOPHAGEAL VARICES BANDING x2;  Surgeon: Rich Coleman MD;  Location: [facility] ENDOSCOPY;  Service:    • ENDOSCOPY N/A 11/24/2017    Procedure: ESOPHAGOGASTRODUODENOSCOPY with biopsies;  Surgeon: Rich Coleman MD;  Location: [facility] ENDOSCOPY;  Service:    • ENDOSCOPY N/A 10/5/2018    Procedure: ESOPHAGOGASTRODUODENOSCOPY;  Surgeon: Rich Coleman MD;  Location: [facility] ENDOSCOPY;  Service: Gastroenterology   • ENDOSCOPY N/A 5/10/2019    Procedure: ESOPHAGOGASTRODUODENOSCOPY AT BEDSIDE WITH VARICEAL LIGATION;  Surgeon: Rich Coleman MD;  Location: [facility] ENDOSCOPY;  Service: Gastroenterology   • ENDOSCOPY N/A 6/28/2019    Procedure: ESOPHAGOGASTRODUODENOSCOPY WITH ESOPHAGEAL BANDING (3 BANDS);  Surgeon: Rich Coleman MD;  Location: [facility] ENDOSCOPY;  Service: Gastroenterology   • ENDOSCOPY N/A 4/3/2021    Procedure: ESOPHAGOGASTRODUODENOSCOPY WITH COLD BIOPSIES & BRUSHINGS;  Surgeon: Endy Vasquez MD;  Location: [facility] ENDOSCOPY;  Service: Gastroenterology;  Laterality: N/A;  PRE- N,V & DYSPHAGIA  POST- GASTRITIS, POSSIBLE FUNGAL ESOPHAGITIS   • ENDOSCOPY AND COLONOSCOPY  2014    Dr. Rich Coleman with  "findings of candida esophagitis   • EYE SURGERY     • HYSTERECTOMY  1986    partial   • JOINT REPLACEMENT     • KNEE SURGERY Right 1995    \"right knee recontstruction\"   • LIVER BIOPSY  01/2015   • MASS EXCISION     • STOMACH SURGERY     • TIPS PROCEDURE  2020    x2      General Information     [facility] Name 02/09/22 1611          Physical Therapy Time and Intention    Document Type evaluation  -     Mode of Treatment physical therapy  -CF     Row Name 02/09/22 1611          General Information    Patient Profile Reviewed yes  -CF     Prior Level of Function independent:  denies AD use, reports hx of falls  -CF     Existing Precautions/Restrictions fall  -CF     Barriers to Rehab none identified  -     Row Name 02/09/22 1611          Living Environment    Lives With spouse; other (see comments)  spouse had heart surgery yesterday  -     Row Name 02/09/22 1611          Cognition    Orientation Status (Cognition) oriented x 4  -[facility] Name 02/09/22 1611          Safety Issues, Functional Mobility    Impairments Affecting Function (Mobility) endurance/activity tolerance; strength  -CF           User Key  (r) = Recorded By, (t) = Taken By, (c) = Cosigned By    Initials Name Provider Type    CF Em White PT Physical Therapist               Mobility     Row Name 02/09/22 1612          Bed Mobility    Bed Mobility supine-sit; sit-supine  -CF     Supine-Sit [place] (Bed Mobility) supervision  -     Sit-Supine [place] (Bed Mobility) supervision  -     Assistive Device (Bed Mobility) head of bed elevated  -     Row Name 02/09/22 1612          Sit-Stand Transfer    Sit-Stand [place] (Transfers) contact guard; verbal cues  -[facility] Name 02/09/22 1612          Gait/Stairs (Locomotion)    [place] Level (Gait) contact guard; standby assist  -CF     Assistive Device (Gait) other (see comments)  none  -CF     Distance in Feet (Gait) 140'  -CF     Deviations/Abnormal Patterns (Gait) chica " decreased; gait speed decreased  -CF     Comment (Gait/Stairs) slow pace, very fatigued after gait  -CF           User Key  (r) = Recorded By, (t) = Taken By, (c) = Cosigned By    Initials Name Provider Type    Em Cohen PT Physical Therapist               Obj/Interventions     [facility] Name 02/09/22 1613          Range of Motion Comprehensive    General Range of Motion no range of motion deficits identified  -[facility] Name 02/09/22 1613          Strength Comprehensive (MMT)    General Manual Muscle Testing (MMT) Assessment no strength deficits identified  -CF     Row Name 02/09/22 1613          Balance    Static Sitting Balance WFL; sitting, edge of bed; unsupported  -CF     Dynamic Sitting Balance WFL; sitting, edge of bed; unsupported  -CF     Static Standing Balance unsupported  -CF     Dynamic Standing Balance WFL; unsupported  -CF     [facility] Name 02/09/22 1613          Sensory Assessment (Somatosensory)    Sensory Assessment (Somatosensory) other (see comments)  peripheral neuropathy  -CF           User Key  (r) = Recorded By, (t) = Taken By, (c) = Cosigned By    Initials Name Provider Type    Em Cohen PT Physical Therapist               Goals/Plan     Row Name 02/09/22 1617          Transfer Goal 1 (PT)    Activity/Assistive Device (Transfer Goal 1, PT) sit-to-stand/stand-to-sit; bed-to-chair/chair-to-bed  -CF     [place] Level/Cues Needed (Transfer Goal 1, PT) independent  -CF     Time Frame (Transfer Goal 1, PT) 1 week  -CF     Row Name 02/09/22 1617          Gait Training Goal 1 (PT)    Activity/Assistive Device (Gait Training Goal 1, PT) gait (walking locomotion)  -CF     [place] Level (Gait Training Goal 1, PT) independent  -CF     Distance (Gait Training Goal 1, PT) 150'  -CF     Time Frame (Gait Training Goal 1, PT) 1 week  -CF           User Key  (r) = Recorded By, (t) = Taken By, (c) = Cosigned By    Initials Name Provider Type    Em Cohen PT Physical  Therapist               Clinical Impression     Row Name 02/09/22 1614          Pain Scale: Numbers Pre/Post-Treatment    Pretreatment Pain Rating 0/10 - no pain  -CF     Posttreatment Pain Rating 0/10 - no pain  -CF     Row Name 02/09/22 1614          Plan of Care Review    Plan of Care Reviewed With patient  -CF     Outcome Summary Pt is a 60 yo female admitted with near syncopal event in waiting room prior to 's heart surgery. Per chart, pt with hx of seizures, anxiety and TIA. pt reports living with her , is IND ADLs and mobility without assistive device use but reports recent falls due to loss of balance. Upon exam, pt sititng EOB about to use commode with nsg aide. Pt completed transfers and gait with cga/sba without AD. Pt demonstrates endurance deficits and hx of balance impairments. PT plans to follow up for one more session friday to ensure mobility is OK to d/c home.  -CF     [facility] Name 02/09/22 1614          Therapy Assessment/Plan (PT)    Rehab Potential (PT) good, to achieve stated therapy goals  -CF     Criteria for Skilled Interventions Met (PT) yes  -CF     Predicted Duration of Therapy Intervention (PT) 1 week  -CF     Row Name 02/09/22 1614          Vital Signs    O2 Delivery Pre Treatment room air  -[facility] Name 02/09/22 1614          Positioning and Restraints    Pre-Treatment Position in bed  -CF     Post Treatment Position bed  -CF     In Bed notified nsg; call light within reach; encouraged to call for assist; exit alarm on; fowlers; side rails up x2  -CF           User Key  (r) = Recorded By, (t) = Taken By, (c) = Cosigned By    Initials Name Provider Type    CF Em White, PT Physical Therapist               Outcome Measures     Row Name 02/09/22 1617          How much help from another person do you currently need...    Turning from your back to your side while in flat bed without using bedrails? 4  -CF     Moving from lying on back to sitting on the side of a flat  bed without bedrails? 4  -CF     Moving to and from a bed to a chair (including a wheelchair)? 3  -CF     Standing up from a chair using your arms (e.g., wheelchair, bedside chair)? 4  -CF     Climbing 3-5 steps with a railing? 3  -CF     To walk in hospital room? 3  -CF     AM-PAC 6 Clicks Score (PT) 21  -CF     Row Name 02/09/22 1617 02/09/22 1612       Functional Assessment    Outcome Measure Options AM-PAC 6 Clicks Basic Mobility (PT)  -CF AM-PAC 6 Clicks Daily Activity (OT)  -          User Key  (r) = Recorded By, (t) = Taken By, (c) = Cosigned By    Initials Name Provider Type    CF Em White, PT Physical Therapist    Radha Chau, OT Occupational Therapist                             Physical Therapy Education                 Title: PT OT SLP Therapies (In Progress)     Topic: Physical Therapy (In Progress)     Point: Mobility training (Done)     Learning Progress Summary           Patient Acceptance, E, VU,NR by  at 2/9/2022 1618                   Point: Home exercise program (Not Started)     Learner Progress:  Not documented in this visit.          Point: Body mechanics (Done)     Learning Progress Summary           Patient Acceptance, E, VU,NR by  at 2/9/2022 1618                   Point: Precautions (Done)     Learning Progress Summary           Patient Acceptance, E, VU,NR by  at 2/9/2022 1618                               User Key     Initials Effective Dates Name Provider Type Discipline     06/16/21 -  Em White, PT Physical Therapist PT              PT Recommendation and Plan  Planned Therapy Interventions (PT): balance training, bed mobility training, gait training, strengthening, patient/family education, transfer training, ROM (range of motion)  Plan of Care Reviewed With: patient  Outcome Summary: Pt is a 62 yo female admitted with near syncopal event in waiting room prior to 's heart surgery. Per chart, pt with hx of seizures, anxiety and TIA. pt reports  living with her , is IND ADLs and mobility without assistive device use but reports recent falls due to loss of balance. Upon exam, pt sititng EOB about to use commode with nsg aide. Pt completed transfers and gait with cga/sba without AD. Pt demonstrates endurance deficits and hx of balance impairments. PT plans to follow up for one more session friday to ensure mobility is OK to d/c home.     Time Calculation:    PT Charges     Row Name 02/09/22 1618             Time Calculation    Start Time 1428  -CF      Stop Time 1443  -CF      Time Calculation (min) 15 min  -CF      PT Received On 02/09/22  -CF      PT - Next Appointment 02/11/22  -CF      PT Goal Re-Cert Due Date 02/16/22  -CF              Time Calculation- PT    Total Timed Code Minutes- PT 9 minute(s)  -CF            User Key  (r) = Recorded By, (t) = Taken By, (c) = Cosigned By    Initials Name Provider Type    CF Em White, PT Physical Therapist              Therapy Charges for Today     Code Description Service Date Service Provider Modifiers Qty    52981225617 HC PT EVAL LOW COMPLEXITY 2 2/9/2022 Em White, PT GP 1    39261114328 HC GAIT TRAINING EA 15 MIN 2/9/2022 Em White, PT GP 1          PT G-Codes  Outcome Measure Options: AM-PAC 6 Clicks Basic Mobility (PT)  AM-PAC 6 Clicks Score (PT): 21  AM-PAC 6 Clicks Score (OT): 23    Em White PT  2/9/2022

## 2022-02-09 NOTE — PLAN OF CARE
Goal Outcome Evaluation:  Plan of Care Reviewed With: (P) patient           Outcome Summary: (P) Patient seen for clinical swallow assessment. No s/s of aspiration or penetration demonstrated. Patient reports difficulty with food getting stuck in sternum area and pills getting stuck in right side of throat d/t cervical web. Symptoms have not worsened per report. Patient presents with a functional oropharyngeal swallow. SLP recommends regular diet with thin liquids. Patient to notify MD if symptoms worsen, would need repeat esophagram at that time. Upright posture and small bites. Pills as emerald.     Patient was not wearing a face mask during this therapy encounter. Therapist used appropriate personal protective equipment including mask, eye protection and gloves.  Mask used was standard procedure mask. Appropriate PPE was worn during the entire therapy session. Hand hygiene was completed before and after therapy session. Patient is in enhanced droplet precautions.

## 2022-02-09 NOTE — H&P
HISTORY AND PHYSICAL   [facility]        Date of Admission: 2022  Patient Identification:  Name: Silvia Zabala  Age: 61 y.o.  Sex: female  :  1960  MRN: [id number]                     Primary Care Physician: Kulwant Martínez APRN    Chief Complaint:  61 year old female who presented to the emergency room after she had a near syncopal episode; she was at the hospital because her  was having heart surgery; the episode occurred while she was in the waiting room and she notified the staff; she has a history of diabetes but not hear disease;     History of Present Illness:   As above    Past Medical History:  Past Medical History:   Diagnosis Date   • Anemia    • Anxiety    • Arthritis    • Broken shoulder     left shoulder    • Chromosomal abnormality     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   • Cirrhosis (HCC)    • Clostridium difficile infection    • Colon polyps    • Depression    • Diabetes mellitus (HCC)     Adult onset, insulin requiring   • Duodenal ulcer with hemorrhage    • Esophageal varices determined by endoscopy ([facility])    • Fibromyalgia    • Gallbladder disease    • Gastric varices    • Gastroparesis    • Glaucoma    • Granuloma annulare    • H/O B12 deficiency    • H/O Bleeding ulcer     And gastroesophageal varices   • H/O mixed connective tissue disorder    • Hemorrhoids    • Hiatal hernia    • History of transfusion     needs bendryl  flushes sensation and temp goes up   • Hx of being hospitalized     In [place] for GI bleeding from ulcer and varices   • Hyperlipidemia    • Migraine    • Mitral valve prolapse    • DUKES (nonalcoholic steatohepatitis) 2016   • Orthostatic hypotension 2019   • BRICE (obstructive sleep apnea)    • Pancreas divisum    • Pancytopenia (HCC)     Chronic, due to cirrhosis and hypersplenism   • Peptic ulcer disease     And esophageal varices   • PONV (postoperative  nausea and vomiting)    • RA (rheumatoid arthritis) (HCC)    • Seizure disorder (HCC)     last 2003   • Seizures (HCC)    • Systemic lupus (HCC)    • TIA (transient ischemic attack) 1984     Past Surgical History:  Past Surgical History:   Procedure Laterality Date   • BARTHOLIN CYST MARSUPIALIZATION Left 1/18/2021    Procedure: EXCISION OF LABIAL CYST;  Surgeon: Melinda Thakkar MD;  Location: [facility] MAIN OR;  Service: Obstetrics/Gynecology;  Laterality: Left;   • BLADDER REPAIR  1991   • CATARACT EXTRACTION     • CHOLECYSTECTOMY  1994   • COLONOSCOPY     • ENDOSCOPY N/A 11/7/2016    Procedure: ESOPHAGOGASTRODUODENOSCOPY WITH COLD POLYPECTOMY, BANDING,  AND CLIPS TIMES 2;  Surgeon: Rich Coleman MD;  Location: [facility] ENDOSCOPY;  Service:    • ENDOSCOPY N/A 12/22/2016    Procedure: ESOPHAGOGASTRODUODENOSCOPY WITH ESOPHAGEAL BANDING. 5 BANDS FIRED, 4 BANDS ADHERERD TO MUCOSA;  Surgeon: Rich Coleman MD;  Location: [facility] ENDOSCOPY;  Service:    • ENDOSCOPY N/A 2/15/2017    Procedure: ESOPHAGOGASTRODUODENOSCOPY AT BEDSIDE with esophageal varices banding (3);  Surgeon: Rich Coleman MD;  Location: [facility] ENDOSCOPY;  Service:    • ENDOSCOPY N/A 4/6/2017    Procedure: ESOPHAGOGASTRODUODENOSCOPY WITH ESOPHAGEAL VARICES BANDING x2;  Surgeon: Rich Coleman MD;  Location: [facility] ENDOSCOPY;  Service:    • ENDOSCOPY N/A 11/24/2017    Procedure: ESOPHAGOGASTRODUODENOSCOPY with biopsies;  Surgeon: Rich Coleamn MD;  Location: [facility] ENDOSCOPY;  Service:    • ENDOSCOPY N/A 10/5/2018    Procedure: ESOPHAGOGASTRODUODENOSCOPY;  Surgeon: Rich Coleman MD;  Location: [facility] ENDOSCOPY;  Service: Gastroenterology   • ENDOSCOPY N/A 5/10/2019    Procedure: ESOPHAGOGASTRODUODENOSCOPY AT BEDSIDE WITH VARICEAL LIGATION;  Surgeon: Rich Coleman MD;  Location: [facility] ENDOSCOPY;  Service: Gastroenterology   • ENDOSCOPY N/A 6/28/2019    Procedure: ESOPHAGOGASTRODUODENOSCOPY WITH ESOPHAGEAL BANDING (3 BANDS);  Surgeon:  "Rich Coleman MD;  Location: [facility] ENDOSCOPY;  Service: Gastroenterology   • ENDOSCOPY N/A 4/3/2021    Procedure: ESOPHAGOGASTRODUODENOSCOPY WITH COLD BIOPSIES & BRUSHINGS;  Surgeon: Endy Vasquez MD;  Location: [facility] ENDOSCOPY;  Service: Gastroenterology;  Laterality: N/A;  PRE- N,V & DYSPHAGIA  POST- GASTRITIS, POSSIBLE FUNGAL ESOPHAGITIS   • ENDOSCOPY AND COLONOSCOPY  2014    Dr. Rich Coleman with findings of candida esophagitis   • EYE SURGERY     • HYSTERECTOMY  1986    partial   • JOINT REPLACEMENT     • KNEE SURGERY Right 1995    \"right knee recontstruction\"   • LIVER BIOPSY  01/2015   • MASS EXCISION     • STOMACH SURGERY     • TIPS PROCEDURE  2020    x2      Home Meds:  Medications Prior to Admission   Medication Sig Dispense Refill Last Dose   • ALPRAZolam (XANAX) 1 MG tablet Take 0.5-1 mg by mouth As Needed for Anxiety.   2/7/2022 at Unknown time   • Cholecalciferol (Vitamin D3) 25 MCG (1000 UT) capsule Take 1,000 Units by mouth Daily.   2/7/2022 at Unknown time   • Docusate Sodium (COLACE PO) Take 2 capsules by mouth every night at bedtime.   2/7/2022 at Unknown time   • ferrous sulfate 325 (65 FE) MG tablet TAKE ONE TABLET BY MOUTH TWICE A DAY (Patient taking differently: Take 325 mg by mouth Daily With Breakfast.) 60 tablet 2 2/8/2022 at Unknown time   • FLUoxetine (PROzac) 10 MG capsule Take 20 mg by mouth Daily.   2/7/2022 at Unknown time   • hydrOXYzine (ATARAX) 25 MG tablet Take 2 tablets by mouth Every Night. (Patient taking differently: Take 50 mg by mouth 2 (Two) Times a Day.) 60 tablet 3 2/8/2022 at Unknown time   • insulin detemir (LEVEMIR) 100 UNIT/ML injection Inject 65 Units under the skin into the appropriate area as directed 2 (Two) Times a Day.   2/8/2022 at Unknown time   • levETIRAcetam (KEPPRA) 500 MG tablet TAKE ONE TABLET BY MOUTH TWICE A DAY (Patient taking differently: Take 500 mg by mouth 2 (Two) Times a Day.) 180 tablet 1 2/8/2022 at Unknown time   • Magnesium Oxide 400 " MG capsule Take 400 mg by mouth Every Night. 90 each 1 2/7/2022 at Unknown time   • metaxalone (Skelaxin) 800 MG tablet Take 800 mg by mouth 3 (Three) Times a Day.   2/8/2022 at Unknown time   • metoclopramide (REGLAN) 10 MG tablet Take 1 tablet by mouth 4 (Four) Times a Day Before Meals & at Bedtime. 120 tablet 1 2/8/2022 at Unknown time   • Multiple Vitamins-Minerals (MULTIVITAMIN WITH MINERALS) tablet tablet Take 1 tablet by mouth Daily.   2/7/2022 at Unknown time   • Narcan 4 MG/0.1ML nasal spray 1 spray into the nostril(s) as directed by provider As Needed.      • NovoLOG FlexPen 100 UNIT/ML solution pen-injector sc pen INJECT 60 UNITS THREE TIMES A DAY WITH MELS (Patient taking differently: Inject 60 Units under the skin into the appropriate area as directed 3 (Three) Times a Day With Meals.) 45 mL 0 2/7/2022 at Unknown time   • Oxaydo 7.5 MG tablet Take 7.5 mg by mouth 2 (Two) Times a Day.   Past Month at Unknown time   • polyethylene glycol (MIRALAX) powder Take 17 g by mouth Daily As Needed.   Past Month at Unknown time   • pregabalin (LYRICA) 75 MG capsule Take 75 mg by mouth 2 (Two) Times a Day.   2/8/2022 at Unknown time   • promethazine (PHENERGAN) 25 MG tablet Take 25 mg by mouth Every 8 (Eight) Hours As Needed for Nausea or Vomiting.   Past Month at Unknown time   • riFAXIMin (Xifaxan) 550 MG tablet Take 1 tablet by mouth 2 (Two) Times a Day. 180 tablet 1 2/8/2022 at Unknown time   • Scopolamine 1 MG/3DAYS patch Place 1 patch on the skin as directed by provider Every 3 (Three) Days.   2/7/2022 at Unknown time   • vitamin B-12 (CYANOCOBALAMIN) 1000 MCG tablet Take 1,000 mcg by mouth Daily.   2/7/2022 at Unknown time   • zinc gluconate 50 MG tablet Take 50 mg by mouth Daily.   2/7/2022 at Unknown time   • zolpidem (AMBIEN) 5 MG tablet Take 5 mg by mouth Every Night.   2/7/2022 at Unknown time   • sertraline (ZOLOFT) 50 MG tablet Take 50 mg by mouth Daily.   More than a month at Unknown time   •  "spironolactone (ALDACTONE) 100 MG tablet TAKE ONE TABLET BY MOUTH DAILY (Patient taking differently: Take 100 mg by mouth Daily As Needed (swelling). For swelling) 30 tablet 2 More than a month at Unknown time       Allergies:  Allergies   Allergen Reactions   • Albuterol Anaphylaxis   • Imitrex [Sumatriptan] Anaphylaxis   • Tramadol Nausea Only, Other (See Comments) and GI Intolerance     Per pt causes her \"palsy, meaning shaking and tremors\"    • Coconut (Cocos Nucifera) Allergy Skin Test Nausea And Vomiting   • Nsaids Other (See Comments)     Told by MD not to take due to liver problems   • Tylenol [Acetaminophen] Other (See Comments)     Has liver problems and told by MD to not take   • Zofran [Ondansetron Hcl] Other (See Comments)     Pt states does not work to correct nausea and vomiting.    • Codeine Nausea Only, Rash and GI Intolerance   • Lactulose Nausea And Vomiting and Other (See Comments)     Severe abdominal pain   • Quinine Derivatives Nausea And Vomiting     Immunizations:  Immunization History   Administered Date(s) Administered   • COVID-19 (PFIZER) PURPLE CAP 04/12/2021, 05/06/2021, 11/08/2021   • Flu Vaccine Quad PF >18YRS 10/24/2016   • Flu Vaccine Quad PF >36MO 01/18/2018   • Hepatitis A 03/22/2016, 09/22/2016   • Hepatitis B 03/22/2016, 04/26/2016, 09/22/2016   • Influenza Split Preservative Free ID 12/01/2015, 10/20/2016, 10/01/2017   • Pneumococcal Conjugate 13-Valent (PCV13) 03/24/2015   • Pneumococcal Polysaccharide (PPSV23) 03/07/2016   • Tdap 11/24/2015   • flucelvax quad pfs =>4 YRS 10/04/2018     Social History:   Social History     Social History Narrative    Moved to [place] from [place]. She is currently medically disabled.     Social History     Socioeconomic History   • Marital status:      Spouse name: Jose   Tobacco Use   • Smoking status: Former Smoker     Packs/day: 1.00     Years: 4.00     Pack years: 4.00     Types: Cigarettes     Quit date: 12/17/2015     Years " since quittin.1   • Smokeless tobacco: Never Used   Vaping Use   • Vaping Use: Never used   Substance and Sexual Activity   • Alcohol use: No   • Drug use: Never   • Sexual activity: Defer       Family History:  Family History   Problem Relation Age of Onset   • Liver disease Other    • Depression Other    • Heart disease Other    • Hypertension Other    • Diabetes Other    • Breast cancer Other    • Brain cancer Other    • Uterine cancer Other    • Colon cancer Other    • Leukemia Other    • Colon cancer Maternal Aunt    • Hypertension Mother    • Diabetes type II Mother    • Rheum arthritis Mother    • Liver disease Mother         DUKES   • Heart disease Father    • Diabetes type II Father    • Diabetes type II Sister    • Lupus Sister    • Malig Hyperthermia Neg Hx         Review of Systems  See history of present illness and past medical history.  Patient denies headache, dizziness, syncope, falls, trauma, change in vision, change in hearing, change in taste, changes in weight, changes in appetite, focal weakness, numbness, or paresthesia.  Patient denies chest pain, palpitations, dyspnea, orthopnea, PND, cough, sinus pressure, rhinorrhea, epistaxis, hemoptysis, nausea, vomiting,hematemesis, diarrhea, constipation or hematchezia.  Denies cold or heat intolerance, polydipsia, polyuria, polyphagia. Denies hematuria, pyuria, dysuria, hesitancy, frequency or urgency. Denies consumption of raw and under cooked meats foods or change in water source.  Denies fever, chills, sweats, night sweats.  Denies missing any routine medications. Remainder of ROS is negative.    Objective:  T Max 24 hrs: Temp (24hrs), Av.6 °F (36.4 °C), Min:97.5 °F (36.4 °C), Max:97.6 °F (36.4 °C)    Vitals Ranges:   Temp:  [97.5 °F (36.4 °C)-97.6 °F (36.4 °C)] 97.6 °F (36.4 °C)  Heart Rate:  [74-81] 75  Resp:  [16-18] 18  BP: (114-137)/(55-68) 135/68      Exam:  /68 (BP Location: Left arm, Patient Position: Sitting)   Pulse 75    "Temp 97.6 °F (36.4 °C) (Oral)   Resp 18   Ht 170.2 cm (67\")   Wt 82.7 kg (182 lb 5.1 oz)   LMP  (LMP Unknown)   SpO2 100%   BMI 28.56 kg/m²     General Appearance:    Alert, cooperative, no distress, appears stated age   Head:    Normocephalic, without obvious abnormality, atraumatic   Eyes:    PERRL, conjunctivae/corneas clear, EOM's intact, both eyes   Ears:    Normal external ear canals, both ears   Nose:   Nares normal, septum midline, mucosa normal, no drainage    or sinus tenderness   Throat:   Lips, mucosa, and tongue normal   Neck:   Supple, symmetrical, trachea midline, no adenopathy;     thyroid:  no enlargement/tenderness/nodules; no carotid    bruit or JVD   Back:     Symmetric, no curvature, ROM normal, no CVA tenderness   Lungs:     Clear to auscultation bilaterally, respirations unlabored   Chest Wall:    No tenderness or deformity    Heart:    Regular rate and rhythm, S1 and S2 normal, no murmur, rub   or gallop   Abdomen:     Soft, nontender, bowel sounds active all four quadrants,     no masses, no hepatomegaly, no splenomegaly   Extremities:   Extremities normal, atraumatic, no cyanosis or edema   Pulses:   2+ and symmetric all extremities   Skin:   Skin color, texture, turgor normal, no rashes or lesions   Lymph nodes:   Cervical, supraclavicular, and axillary nodes normal   Neurologic:   CNII-XII intact, normal strength, sensation intact throughout      .    Data Review:  Labs in chart were reviewed.  WBC   Date Value Ref Range Status   02/08/2022 5.76 3.40 - 10.80 10*3/mm3 Final     Hemoglobin   Date Value Ref Range Status   02/08/2022 12.8 12.0 - 15.9 g/dL Final     Hematocrit   Date Value Ref Range Status   02/08/2022 36.6 34.0 - 46.6 % Final     Platelets   Date Value Ref Range Status   02/08/2022 95 (L) 140 - 450 10*3/mm3 Final     Sodium   Date Value Ref Range Status   02/08/2022 136 136 - 145 mmol/L Final     Potassium   Date Value Ref Range Status   02/08/2022 3.4 (L) 3.5 - 5.2 " mmol/L Final     Chloride   Date Value Ref Range Status   02/08/2022 103 98 - 107 mmol/L Final     CO2   Date Value Ref Range Status   02/08/2022 22.4 22.0 - 29.0 mmol/L Final     BUN   Date Value Ref Range Status   02/08/2022 20 6 - 20 mg/dL Final     Creatinine   Date Value Ref Range Status   02/08/2022 1.17 (H) 0.57 - 1.00 mg/dL Final     Glucose   Date Value Ref Range Status   02/08/2022 168 (H) 65 - 99 mg/dL Final     Calcium   Date Value Ref Range Status   02/08/2022 8.9 8.6 - 10.5 mg/dL Final     Magnesium   Date Value Ref Range Status   02/08/2022 1.8 1.6 - 2.6 mg/dL Final     AST (SGOT)   Date Value Ref Range Status   02/08/2022 35 (H) 1 - 32 U/L Final     ALT (SGPT)   Date Value Ref Range Status   02/08/2022 37 (H) 1 - 33 U/L Final     Alkaline Phosphatase   Date Value Ref Range Status   02/08/2022 118 (H) 39 - 117 U/L Final       Results from last 7 days   Lab Units 02/08/22  1228 02/08/22  0956   TSH uIU/mL  --  13.300*   T3 FREE pg/mL 3.02  --    FREE T4 ng/dL 1.10  --           Imaging Results (All)     Procedure Component Value Units Date/Time    XR Chest 1 View [[id number]] Collected: 02/08/22 1127     Updated: 02/08/22 1127    Narrative:      SINGLE VIEW CHEST RADIOGRAPH     HISTORY: 61-year-old female with a history of dyspnea.     FINDINGS: A portable AP chest radiograph was obtained. Comparison is  made to a chest radiograph dated 09/20/2021. The lungs are normal in  volume and are clear. The cardiac silhouette is within normal limits. No  bony abnormality of the chest is appreciated.       Impression:      Negative chest radiograph.           Patient Active Problem List   Diagnosis Code   • Cirrhosis of liver (HCC) K74.60   • Rheumatoid arthritis (HCC) M06.9   • Anxiety and depression F41.9, F32.A   • Type 2 diabetes mellitus, uncontrolled, with neuropathy (HCC) E11.40, E11.65   • Fibrositis M79.7   • Change in blood platelet count [id number]   • Pancytopenia (HCC) D61.818   • Hypersplenism D73.1    • Nausea & vomiting R11.2   • DUKES (nonalcoholic steatohepatitis) K75.81   • Hyperlipidemia E78.5   • Abdominal pain R10.9   • Hematemesis K92.0   • Ascites R18.8   • Portal hypertension (HCC) K76.6   • Secondary esophageal varices without bleeding (HCC) I85.10   • Esophageal varices with bleeding (HCC) I85.01   • Gastroparesis K31.84   • 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 (HCC) K75.81, K74.60   • Diabetic ketoacidosis without coma associated with type 2 diabetes mellitus (HCC) E11.10   • Diabetic peripheral neuropathy (HCC) E11.42   • History of seizure disorder Z86.69   • Hepatic encephalopathy (HCC) K72.90   • Thrombocytopenia (HCC) D69.6   • Fever R50.9   • Acute ITP (HCC) D69.3   • Degeneration of lumbosacral intervertebral disc M51.37   • Spondylosis without myelopathy M47.819   • UGI bleed K92.2   • Migraine without aura G43.009   • Urinary retention R33.9   • Uncontrolled diabetes mellitus with hyperglycemia (HCC) E11.65   • BRICE (obstructive sleep apnea) G47.33   • Gastroparesis K31.84   • Hyperammonemia (HCC) E72.20   • Hyponatremia E87.1   • Anemia D64.9   • Vitamin D insufficiency E55.9   • Hyperglycemia R73.9   • Iron deficiency anemia D50.9   • Adverse effect of iron and its compounds, sequela T45.4X5S   • Hemorrhage of anus and rectum K62.5   • Vulvar lesion N90.89   • Acute upper GI bleed K92.2   • Acute blood loss anemia D62   • Acute hyperkalemia E87.5   • Labial cyst N90.7   • Transaminitis R74.01   • Hyperbilirubinemia E80.6   • Acute gastritis K29.00   • UTI (urinary tract infection), bacterial N39.0, A49.9   • UTI (urinary tract infection) N39.0   • Cholestatic pruritus L29.8   • Fatty liver disease, nonalcoholic K76.0   • Protein-calorie malnutrition, moderate (HCC) E44.0   • Hyperglycemia due to type 1 diabetes mellitus (HCC) E10.65   • Ascites R18.8   • Hospital discharge follow-up Z09   • Near syncope R55       Assessment:  Active Hospital Problems    Diagnosis  POA   • Near syncope [R55]   Yes      Resolved Hospital Problems   No resolved problems to display.   diabetes  Diabetic neuropathy  Cirrhosis  Rheumatoid arthritis  Hypokalemia  thrombocytopenia    Plan:  Will replace potassium  tsh is high   Start a small dose of synthroid  Check echo, carotid doppler  She has ruled out for acs with negative troponin  accu checks, insulin sliding scale  DGagew patient and ED provider    Reva Angela MD  2/8/2022  19:24 EST

## 2022-02-09 NOTE — THERAPY DISCHARGE NOTE
Acute Care - Speech Language Pathology   Swallow Initial Evaluation/Discharge [facility]     Patient Name: Silvia Zabala  : 1960  MRN: [id number]  Today's Date: 2022               Admit Date: 2022    Visit Dx:    ICD-10-CM ICD-9-CM   1. Near syncope  R55 780.2   2. New onset right bundle branch block (RBBB)  I45.10 426.4   3. Polypharmacy  Z79.899 V58.69   4. Acute UTI  N39.0 599.0   5. Hypothyroidism, unspecified type  E03.9 244.9     Patient Active Problem List   Diagnosis   • Cirrhosis of liver (HCC)   • Rheumatoid arthritis (HCC)   • Anxiety and depression   • Type 2 diabetes mellitus, uncontrolled, with neuropathy (HCC)   • Fibrositis   • Change in blood platelet count   • Pancytopenia (HCC)   • Hypersplenism   • Nausea & vomiting   • DUKES (nonalcoholic steatohepatitis)   • Hyperlipidemia   • Abdominal pain   • Hematemesis   • Ascites   • Portal hypertension (HCC)   • Secondary esophageal varices without bleeding (HCC)   • Esophageal varices with bleeding (HCC)   • Gastroparesis   • 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 (HCC)   • Diabetic ketoacidosis without coma associated with type 2 diabetes mellitus (HCC)   • Diabetic peripheral neuropathy (HCC)   • History of seizure disorder   • Hepatic encephalopathy (HCC)   • Thrombocytopenia (HCC)   • Fever   • Acute ITP (HCC)   • Degeneration of lumbosacral intervertebral disc   • Spondylosis without myelopathy   • UGI bleed   • Migraine without aura   • Urinary retention   • Uncontrolled diabetes mellitus with hyperglycemia (HCC)   • BRICE (obstructive sleep apnea)   • Gastroparesis   • Hyperammonemia (HCC)   • Hyponatremia   • Anemia   • Vitamin D insufficiency   • Hyperglycemia   • Iron deficiency anemia   • Adverse effect of iron and its compounds, sequela   • Hemorrhage of anus and rectum   • Vulvar lesion   • Acute upper GI bleed   • Acute blood loss anemia   • Acute hyperkalemia   • Labial cyst   • Transaminitis   • Hyperbilirubinemia   • Acute  gastritis   • UTI (urinary tract infection), bacterial   • UTI (urinary tract infection)   • Cholestatic pruritus   • Fatty liver disease, nonalcoholic   • Protein-calorie malnutrition, moderate (HCC)   • Hyperglycemia due to type 1 diabetes mellitus (HCC)   • Ascites   • Hospital discharge follow-up   • Near syncope     Past Medical History:   Diagnosis Date   • Anemia    • Anxiety    • Arthritis    • Broken shoulder     left shoulder    • Chromosomal abnormality     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   • Cirrhosis (HCC)    • Clostridium difficile infection    • Colon polyps    • Depression    • Diabetes mellitus (HCC)     Adult onset, insulin requiring   • Duodenal ulcer with hemorrhage    • Esophageal varices determined by endoscopy (HCC)    • Fibromyalgia    • Gallbladder disease    • Gastric varices    • Gastroparesis    • Glaucoma    • Granuloma annulare    • H/O B12 deficiency    • H/O Bleeding ulcer     And gastroesophageal varices   • H/O mixed connective tissue disorder    • Hemorrhoids    • Hiatal hernia    • History of transfusion     needs bendryl  flushes sensation and temp goes up   • Hx of being hospitalized     In [place] for GI bleeding from ulcer and varices   • Hyperlipidemia    • Migraine    • Mitral valve prolapse    • DUKES (nonalcoholic steatohepatitis) 11/2016   • Orthostatic hypotension 02/2019   • BRICE (obstructive sleep apnea)    • Pancreas divisum    • Pancytopenia (HCC)     Chronic, due to cirrhosis and hypersplenism   • Peptic ulcer disease     And esophageal varices   • PONV (postoperative nausea and vomiting)    • RA (rheumatoid arthritis) (HCC)    • Seizure disorder (HCC)     last 2003   • Seizures (HCC)    • Systemic lupus (HCC)    • TIA (transient ischemic attack) 1984     Past Surgical History:   Procedure Laterality Date   • BARTHOLIN CYST MARSUPIALIZATION Left 1/18/2021    Procedure: EXCISION OF  LABIAL CYST;  Surgeon: Melinda Thakkar MD;  Location: [facility] MAIN OR;  Service: Obstetrics/Gynecology;  Laterality: Left;   • BLADDER REPAIR  1991   • CATARACT EXTRACTION     • CHOLECYSTECTOMY  1994   • COLONOSCOPY     • ENDOSCOPY N/A 11/7/2016    Procedure: ESOPHAGOGASTRODUODENOSCOPY WITH COLD POLYPECTOMY, BANDING,  AND CLIPS TIMES 2;  Surgeon: Rich Coleman MD;  Location: [facility] ENDOSCOPY;  Service:    • ENDOSCOPY N/A 12/22/2016    Procedure: ESOPHAGOGASTRODUODENOSCOPY WITH ESOPHAGEAL BANDING. 5 BANDS FIRED, 4 BANDS ADHERERD TO MUCOSA;  Surgeon: Rich Coleman MD;  Location: [facility] ENDOSCOPY;  Service:    • ENDOSCOPY N/A 2/15/2017    Procedure: ESOPHAGOGASTRODUODENOSCOPY AT BEDSIDE with esophageal varices banding (3);  Surgeon: Rich Coleman MD;  Location: [facility] ENDOSCOPY;  Service:    • ENDOSCOPY N/A 4/6/2017    Procedure: ESOPHAGOGASTRODUODENOSCOPY WITH ESOPHAGEAL VARICES BANDING x2;  Surgeon: Rich Coleman MD;  Location: [facility] ENDOSCOPY;  Service:    • ENDOSCOPY N/A 11/24/2017    Procedure: ESOPHAGOGASTRODUODENOSCOPY with biopsies;  Surgeon: Rich Coleman MD;  Location: [facility] ENDOSCOPY;  Service:    • ENDOSCOPY N/A 10/5/2018    Procedure: ESOPHAGOGASTRODUODENOSCOPY;  Surgeon: Rich Coleman MD;  Location: [facility] ENDOSCOPY;  Service: Gastroenterology   • ENDOSCOPY N/A 5/10/2019    Procedure: ESOPHAGOGASTRODUODENOSCOPY AT BEDSIDE WITH VARICEAL LIGATION;  Surgeon: Rich Coleman MD;  Location: [facility] ENDOSCOPY;  Service: Gastroenterology   • ENDOSCOPY N/A 6/28/2019    Procedure: ESOPHAGOGASTRODUODENOSCOPY WITH ESOPHAGEAL BANDING (3 BANDS);  Surgeon: Rich Coleman MD;  Location: [facility] ENDOSCOPY;  Service: Gastroenterology   • ENDOSCOPY N/A 4/3/2021    Procedure: ESOPHAGOGASTRODUODENOSCOPY WITH COLD BIOPSIES & BRUSHINGS;  Surgeon: Endy Vasquez MD;  Location: [facility] ENDOSCOPY;  Service: Gastroenterology;  Laterality: N/A;  PRE- N,V & DYSPHAGIA  POST- GASTRITIS, POSSIBLE FUNGAL  "ESOPHAGITIS   • ENDOSCOPY AND COLONOSCOPY  2014    Dr. Rich Coleman with findings of candida esophagitis   • EYE SURGERY     • HYSTERECTOMY  1986    partial   • JOINT REPLACEMENT     • KNEE SURGERY Right 1995    \"right knee recontstruction\"   • LIVER BIOPSY  01/2015   • MASS EXCISION     • STOMACH SURGERY     • TIPS PROCEDURE  2020    x2       SLP Recommendation and Plan           Monitor for Signs of Aspiration: yes, notify SLP if any concerns (02/09/22 1000)        Anticipated Discharge Disposition (SLP): unknown (02/09/22 1000)                    Anticipated Discharge Disposition (SLP): unknown (02/09/22 1000)                           SWALLOW EVALUATION (last 72 hours)     SLP Adult Swallow Evaluation     Row Name 02/09/22 1000                   Rehab Evaluation    Document Type evaluation (P)   -CR        Subjective Information no complaints (P)   -CR        Patient Observations alert; cooperative (P)   -CR        Patient Effort excellent (P)   -CR        Symptoms Noted During/After Treatment none (P)   -CR                  General Information    Patient Profile Reviewed yes (P)   -CR        Pertinent History Of Current Problem Pt admitted while at hospital for 's heart surgery d/t near syncopal episode. C/o headaches, weakness, nausea. Pt presented with UTI and RBBB.  -        Current Method of Nutrition regular textures; whole; thin liquids (P)   -CR        Precautions/Limitations, Vision WFL (P)   -CR        Precautions/Limitations, Hearing WFL (P)   -CR        Prior Level of Function-Communication WFL (P)   -CR        Prior Level of Function-Swallowing no diet consistency restrictions; esophageal concerns  -        Plans/Goals Discussed with patient (P)   -CR        Barriers to Rehab none identified (P)   -CR        Patient's Goals for Discharge return home (P)   -CR                  Pain    Additional Documentation Pain Scale: Numbers Pre/Post-Treatment (Group) (P)   -CR                  Pain " Scale: Numbers Pre/Post-Treatment    Pretreatment Pain Rating 8/10 (P)   -CR        Posttreatment Pain Rating 8/10 (P)   -CR        Pain Location - Orientation generalized (P)   -CR                  Oral Motor Structure and Function    Dentition Assessment has dentures but unable to use them to poor fit/pain (P)   -CR        Secretion Management WNL/WFL (P)   -CR        Mucosal Quality moist, healthy (P)   -CR        Volitional Swallow WFL (P)   -CR                  Oral Musculature and Cranial Nerve Assessment    Oral Motor, Comment Patient able to respond to all oral motor tasks. Adequate lingual ROM observed. Patient exhibited poor sensation to right side of cheek. Pt reported right sided weakness. Oral labial seal appeared normal. Pt exhibited strong and timely consonants during diadochokinesis tasks. Adequate velar elevation observed. (P)   -CR                  General Eating/Swallowing Observations    Respiratory Support Currently in Use room air (P)   -CR                  Clinical Swallow Eval    Clinical Swallow Evaluation Summary Patient was seen for clinical swallow evaluation. Patient reports occasional difficulty when eating and drinking. Of note, she reports the feeling of bolus getting stuck near sternum region. Also reported pills get stuck on the right side of her neck d/t cervical web. No overt s/s of aspiration or penetration observed during trials of ice chip or thin liquids via spoon, cup, and straw. Adequate bolus acceptance and oral manipulation with solids. No overt s/s of aspiration or penetration demonstrated during trials of puree, mechanical soft, mixed consistency, and regular. Munching mastication observed during regular solid and mech soft trials. Patient did report need to break crackers into smaller pieces before mastication. Pt presents with a functional swallow.  -                  SLP Evaluation Clinical Impression    SLP Swallowing Diagnosis swallow WFL (P)   -CR         Functional Impact no impact on function (P)   -CR        Swallow Criteria for Skilled Therapeutic Interventions Met no problems identified which require skilled intervention (P)   -CR                  Recommendations    Therapy Frequency (Swallow) PRN (P)   -CR        Predicted Duration Therapy Intervention (Days) until discharge (P)   -CR        SLP Diet Recommendation regular textures; whole; thin liquids (P)   -CR        Recommended Diagnostics No further SLP services recommended; other (see comments) (P)   Encourage patient to request repeat esophagram if symptoms worsen.  -CR        Recommended Precautions and Strategies upright posture during/after eating; small bites of food and sips of liquid (P)   -CR        Oral Care Recommendations Oral Care BID/PRN (P)   -CR        SLP Rec. for Method of Medication Administration meds whole; as tolerated  -        Monitor for Signs of Aspiration yes; notify SLP if any concerns  -        Anticipated Discharge Disposition (SLP) unknown  -              User Key  (r) = Recorded By, (t) = Taken By, (c) = Cosigned By    Initials Name Effective Dates     Sheila Guzman MS CCC-SLP 06/16/21 -     Kristan Silva, Speech Therapy Student 01/14/22 -                 EDUCATION  The patient has been educated in the following areas:   Dysphagia (Swallowing Impairment).            SLP Outcome Measures (last 72 hours)     SLP Outcome Measures     Row Name 02/09/22 1100             SLP Outcome Measures    Outcome Measure Used? Adult NOMS (P)   -CR              Adult FCM Scores    FCM Chosen Swallowing (P)   -CR      Swallowing FCM Score 7  -            User Key  (r) = Recorded By, (t) = Taken By, (c) = Cosigned By    Initials Name Effective Dates     Sheila Guzman MS CCC-SLP 06/16/21 -     Kristan Silva, Speech Therapy Student 01/14/22 -                  Time Calculation:    Time Calculation- SLP     Row Name 02/09/22 1201 02/09/22 1142          Time Calculation-  SLP    SLP Start Time -- 0930 (P)   -CR     SLP Received On -- 02/09/22 (P)   -CR            Untimed Charges    [id number] Eval Oral Pharyng Swallow Minutes 60  -SH 45 (P)   -CR            Total Minutes    Untimed Charges Total Minutes 60  -SH 45 (P)   -CR      Total Minutes 60  -SH 45 (P)   -CR           User Key  (r) = Recorded By, (t) = Taken By, (c) = Cosigned By    Initials Name Provider Type    Sheila Apple MS CCC-SLP Speech and Language Pathologist    CR Kristan Solo, Speech Therapy Student Speech Therapy Student                Therapy Charges for Today     Code Description Service Date Service Provider Modifiers Qty    [id number] HC ST EVAL ORAL PHARYNG SWALLOW 4 2/9/2022 Sheila Guzman MS CCC-SLP GN 1               SLP Discharge Summary  Anticipated Discharge Disposition (SLP): unknown    Sheila Gumzan MS CCC-SLP  2/9/2022

## 2022-02-10 ENCOUNTER — READMISSION MANAGEMENT (OUTPATIENT)
Dept: CALL CENTER | Facility: HOSPITAL | Age: 62
End: 2022-02-10

## 2022-02-10 VITALS
SYSTOLIC BLOOD PRESSURE: 128 MMHG | RESPIRATION RATE: 18 BRPM | DIASTOLIC BLOOD PRESSURE: 69 MMHG | HEIGHT: 69 IN | WEIGHT: 187.61 LBS | HEART RATE: 76 BPM | TEMPERATURE: 98.3 F | BODY MASS INDEX: 27.79 KG/M2 | OXYGEN SATURATION: 97 %

## 2022-02-10 LAB
ANION GAP SERPL CALCULATED.3IONS-SCNC: 9.4 MMOL/L (ref 5–15)
BUN SERPL-MCNC: 17 MG/DL (ref 6–20)
BUN/CREAT SERPL: 20.2 (ref 7–25)
CALCIUM SPEC-SCNC: 8.6 MG/DL (ref 8.6–10.5)
CHLORIDE SERPL-SCNC: 107 MMOL/L (ref 98–107)
CO2 SERPL-SCNC: 21.6 MMOL/L (ref 22–29)
CREAT SERPL-MCNC: 0.84 MG/DL (ref 0.57–1)
DEPRECATED RDW RBC AUTO: 47 FL (ref 37–54)
ERYTHROCYTE [DISTWIDTH] IN BLOOD BY AUTOMATED COUNT: 14.5 % (ref 12.3–15.4)
GFR SERPL CREATININE-BSD FRML MDRD: 69 ML/MIN/1.73
GLUCOSE BLDC GLUCOMTR-MCNC: 118 MG/DL (ref 70–130)
GLUCOSE BLDC GLUCOMTR-MCNC: 197 MG/DL (ref 70–130)
GLUCOSE SERPL-MCNC: 202 MG/DL (ref 65–99)
HCT VFR BLD AUTO: 31.7 % (ref 34–46.6)
HGB BLD-MCNC: 10.8 G/DL (ref 12–15.9)
MCH RBC QN AUTO: 30.2 PG (ref 26.6–33)
MCHC RBC AUTO-ENTMCNC: 34.1 G/DL (ref 31.5–35.7)
MCV RBC AUTO: 88.5 FL (ref 79–97)
PLATELET # BLD AUTO: 50 10*3/MM3 (ref 140–450)
PMV BLD AUTO: 10.8 FL (ref 6–12)
POTASSIUM SERPL-SCNC: 4.1 MMOL/L (ref 3.5–5.2)
RBC # BLD AUTO: 3.58 10*6/MM3 (ref 3.77–5.28)
SODIUM SERPL-SCNC: 138 MMOL/L (ref 136–145)
WBC NRBC COR # BLD: 2.69 10*3/MM3 (ref 3.4–10.8)

## 2022-02-10 PROCEDURE — 82962 GLUCOSE BLOOD TEST: CPT

## 2022-02-10 PROCEDURE — 85027 COMPLETE CBC AUTOMATED: CPT | Performed by: HOSPITALIST

## 2022-02-10 PROCEDURE — 90686 IIV4 VACC NO PRSV 0.5 ML IM: CPT | Performed by: INTERNAL MEDICINE

## 2022-02-10 PROCEDURE — G0378 HOSPITAL OBSERVATION PER HR: HCPCS

## 2022-02-10 PROCEDURE — G0008 ADMIN INFLUENZA VIRUS VAC: HCPCS | Performed by: INTERNAL MEDICINE

## 2022-02-10 PROCEDURE — 80048 BASIC METABOLIC PNL TOTAL CA: CPT | Performed by: HOSPITALIST

## 2022-02-10 PROCEDURE — 63710000001 INSULIN LISPRO (HUMAN) PER 5 UNITS: Performed by: INTERNAL MEDICINE

## 2022-02-10 PROCEDURE — 25010000002 INFLUENZA VAC SPLIT QUAD 0.5 ML SUSPENSION PREFILLED SYRINGE: Performed by: INTERNAL MEDICINE

## 2022-02-10 RX ADMIN — Medication 220 MG: at 08:29

## 2022-02-10 RX ADMIN — FLUOXETINE HYDROCHLORIDE 20 MG: 20 CAPSULE ORAL at 08:30

## 2022-02-10 RX ADMIN — SERTRALINE HYDROCHLORIDE 50 MG: 50 TABLET, FILM COATED ORAL at 08:30

## 2022-02-10 RX ADMIN — INFLUENZA VIRUS VACCINE 0.5 ML: 15; 15; 15; 15 SUSPENSION INTRAMUSCULAR at 12:14

## 2022-02-10 RX ADMIN — Medication 1000 UNITS: at 08:31

## 2022-02-10 RX ADMIN — PREGABALIN 75 MG: 75 CAPSULE ORAL at 08:40

## 2022-02-10 RX ADMIN — FERROUS SULFATE TAB 325 MG (65 MG ELEMENTAL FE) 325 MG: 325 (65 FE) TAB at 08:31

## 2022-02-10 RX ADMIN — MULTIPLE VITAMINS W/ MINERALS TAB 1 TABLET: TAB at 08:30

## 2022-02-10 RX ADMIN — Medication 1000 MCG: at 08:30

## 2022-02-10 RX ADMIN — RIFAXIMIN 550 MG: 550 TABLET ORAL at 08:30

## 2022-02-10 RX ADMIN — LEVETIRACETAM 500 MG: 500 TABLET, FILM COATED ORAL at 08:30

## 2022-02-10 RX ADMIN — ACETAMINOPHEN 650 MG: 325 TABLET, FILM COATED ORAL at 06:33

## 2022-02-10 RX ADMIN — INSULIN LISPRO 60 UNITS: 100 INJECTION, SOLUTION INTRAVENOUS; SUBCUTANEOUS at 08:31

## 2022-02-10 RX ADMIN — METOCLOPRAMIDE 10 MG: 10 TABLET ORAL at 06:33

## 2022-02-10 RX ADMIN — HYDROXYZINE HYDROCHLORIDE 50 MG: 50 TABLET ORAL at 08:30

## 2022-02-10 RX ADMIN — METOCLOPRAMIDE 10 MG: 10 TABLET ORAL at 12:14

## 2022-02-10 RX ADMIN — INSULIN GLARGINE-YFGN 65 UNITS: 100 INJECTION, SOLUTION SUBCUTANEOUS at 08:34

## 2022-02-10 RX ADMIN — METAXALONE 800 MG: 800 TABLET ORAL at 08:30

## 2022-02-10 NOTE — PLAN OF CARE
Goal Outcome Evaluation:  Plan of Care Reviewed With: patient        Progress: improving  Outcome Summary: VSS. No c/o nausea. No dizziness/weakness upon standing or ambulating to bathroom. C/o small headache- relieved with PRN tylenol. No other changes. Hopeful to be discharged soon. BREANNA

## 2022-02-10 NOTE — CASE MANAGEMENT/SOCIAL WORK
Case Management Discharge Note      Final Note: Home with sister to assist. Spouse on CVU post CABG. Pt's sister will be staying with pt and her spouse to assist. Transport by private auto.         Selected Continued Care - Admitted Since 2/8/2022     Destination    No services have been selected for the patient.              Durable Medical Equipment    No services have been selected for the patient.              Dialysis/Infusion    No services have been selected for the patient.              Home Medical Care    No services have been selected for the patient.              Therapy    No services have been selected for the patient.              Community Resources    No services have been selected for the patient.              Community & DME    No services have been selected for the patient.                  Transportation Services  Private: Car    Final Discharge Disposition Code: 01 - home or self-care

## 2022-02-10 NOTE — OUTREACH NOTE
Prep Survey      Responses   [facility] facility patient discharged from? [facility]   Is LACE score < 7 ? No   Emergency Room discharge w/ pulse ox? No   Eligibility [facility]   Date of Admission 02/08/22   Date of Discharge 02/10/22   Discharge Disposition Home or Self Care   Discharge diagnosis Near syncope  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    Does the patient have one of the following disease processes/diagnoses(primary or secondary)? Other   Does the patient have Home health ordered? No   Is there a DME ordered? No   Prep survey completed? Yes          Blanca Camp RN

## 2022-02-10 NOTE — PLAN OF CARE
Goal Outcome Evaluation:  Plan of Care Reviewed With: patient        Progress: improving  Outcome Summary: VSS. Echo and carotid doppler done. IVF stopped. BG are erratic. Will continue to monitor.

## 2022-02-10 NOTE — PROGRESS NOTES
Name: Silvia Zabala ADMIT: 2022   : 1960  PCP: Kulwant Martínez APRN    MRN: [id number] LOS: 0 days   AGE/SEX: 61 y.o. female  ROOM: [facility]     Subjective   Subjective   States she feels much better now than on admission.  No near-syncope.    Review of Systems     Objective   Objective   Vital Signs  Temp:  [97.6 °F (36.4 °C)-98.5 °F (36.9 °C)] 98.5 °F (36.9 °C)  Heart Rate:  [72-85] 85  Resp:  [18] 18  BP: (116-137)/(57-78) 133/78  SpO2:  [94 %-100 %] 94 %  on   ;   Device (Oxygen Therapy): room air  Body mass index is 27.76 kg/m².  Physical Exam  Constitutional:       General: She is not in acute distress.     Appearance: She is not diaphoretic.   Cardiovascular:      Rate and Rhythm: Normal rate and regular rhythm.      Heart sounds: Normal heart sounds.   Pulmonary:      Effort: Pulmonary effort is normal.      Breath sounds: Normal breath sounds.   Abdominal:      General: Bowel sounds are normal.      Palpations: Abdomen is soft.      Tenderness: There is no abdominal tenderness.   Musculoskeletal:         General: No swelling.   Skin:     General: Skin is warm and dry.   Neurological:      Mental Status: She is alert and oriented to person, place, and time.         Results Review     I reviewed the patient's new clinical results.  Results from last 7 days   Lab Units 22  0331 22  0956   WBC 10*3/mm3 2.51* 5.76   HEMOGLOBIN g/dL 10.8* 12.8   PLATELETS 10*3/mm3 60* 95*     Results from last 7 days   Lab Units 22  0331 22  0956   SODIUM mmol/L 138 136   POTASSIUM mmol/L 4.1 3.4*   CHLORIDE mmol/L 108* 103   CO2 mmol/L 19.2* 22.4   BUN mg/dL 15 20   CREATININE mg/dL 0.84 1.17*   GLUCOSE mg/dL 293* 168*   EGFR IF NONAFRICN AM mL/min/1.73 69 47*     Results from last 7 days   Lab Units 22  0956   ALBUMIN g/dL 3.90   BILIRUBIN mg/dL 1.4*   ALK PHOS U/L 118*   AST (SGOT) U/L 35*   ALT (SGPT) U/L 37*     Results from last 7 days   Lab Units 22  0331 22  0956    CALCIUM mg/dL 7.9* 8.9   ALBUMIN g/dL  --  3.90   MAGNESIUM mg/dL  --  1.8       Results from last 7 days   Lab Units 02/08/22  1228 02/08/22  0956   TSH uIU/mL  --  13.300*   FREE T4 ng/dL 1.10  --    T3 FREE pg/mL 3.02  --      Hemoglobin A1C   Date/Time Value Ref Range Status   02/09/2022 0331 7.50 (H) 4.80 - 5.60 % Final     Glucose   Date/Time Value Ref Range Status   02/09/2022 1649 104 70 - 130 mg/dL Final     Comment:     Meter: [id number] : [id number] Angelique Kanchan RN   02/09/2022 1622 56 (L) 70 - 130 mg/dL Final     Comment:     Meter: [id number] : [id number] Reji Bishop NA   02/09/2022 1225 390 (H) 70 - 130 mg/dL Final     Comment:     Meter: [id number] : [id number] Angeliqueny Hallne RN   02/09/2022 0621 287 (H) 70 - 130 mg/dL Final     Comment:     Meter: [id number] : [id number] Eastman Katy JOE   02/08/2022 2350 388 (H) 70 - 130 mg/dL Final     Comment:     Meter: [id number] : [id number] Adam Pelayo RN   02/08/2022 2152 356 (H) 70 - 130 mg/dL Final     Comment:     Meter: [id number] : [id number] Adam Pelayo RN   02/08/2022 1715 112 70 - 130 mg/dL Final     Comment:     Meter: [id number] : [id number] Mendoza Bishop NA       XR Chest 1 View  Narrative: SINGLE VIEW CHEST RADIOGRAPH     HISTORY: 61-year-old female with a history of dyspnea.     FINDINGS: A portable AP chest radiograph was obtained. Comparison is  made to a chest radiograph dated 09/20/2021. The lungs are normal in  volume and are clear. The cardiac silhouette is within normal limits. No  bony abnormality of the chest is appreciated.     Impression: Negative chest radiograph.     This report was finalized on 2/9/2022 4:37 PM by Dr. Gee Reyna M.D.     Adult Transthoracic Echo Complete W/ Cont if Necessary Per Protocol  · Estimated left ventricular EF = 70% Left ventricular systolic function   is normal.  · Left ventricular diastolic function was normal.  · Left atrial volume is mildly increased.     Duplex  Carotid Ultrasound CAR  · Proximal right internal carotid artery is normal.  · Proximal left internal carotid artery is normal.       Scheduled Medications  cefTRIAXone, 1 g, Intravenous, Q24H  cholecalciferol, 1,000 Units, Oral, Daily  enoxaparin, 40 mg, Subcutaneous, Nightly  ferrous sulfate, 325 mg, Oral, Daily With Breakfast  FLUoxetine, 20 mg, Oral, Daily  hydrOXYzine, 50 mg, Oral, BID  insulin glargine, 65 Units, Subcutaneous, Q12H  insulin lispro, 0-9 Units, Subcutaneous, TID With Meals  insulin lispro, 60 Units, Subcutaneous, TID With Meals  levETIRAcetam, 500 mg, Oral, BID  levothyroxine, 25 mcg, Oral, Q AM  magnesium oxide, 400 mg, Oral, Nightly  metaxalone, 800 mg, Oral, TID  metoclopramide, 10 mg, Oral, 4x Daily AC & at Bedtime  multivitamin with minerals, 1 tablet, Oral, Daily  pregabalin, 75 mg, Oral, Q12H  riFAXIMin, 550 mg, Oral, BID  Scopolamine, 1 patch, Transdermal, Once  sertraline, 50 mg, Oral, Daily  vitamin B-12, 1,000 mcg, Oral, Daily  zinc sulfate, 220 mg, Oral, Daily  zolpidem, 5 mg, Oral, Nightly    Infusions   Diet  Diet Regular; Cardiac       Assessment/Plan     Active Hospital Problems    Diagnosis  POA   • **Near syncope [R55]  Yes   • Abnormal TSH [R79.89]  Yes   • 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 (HCC) [K75.81, K74.60]  Yes   • Portal hypertension (HCC) [K76.6]  Yes   • Type 2 diabetes mellitus, uncontrolled, with neuropathy (HCC) [E11.40, E11.65]  Yes   • Rheumatoid arthritis (HCC) [M06.9]  Yes      Resolved Hospital Problems   No resolved problems to display.       61 y.o. female admitted with Near syncope.    Patient's work-up thus far rather unremarkable.  Cardiology does not feel additional cardiac work-up is indicated.  Echocardiogram and carotid Dopplers unremarkable.  Urinalysis and presentation not consistent with UTI.  Discontinue antibiotics.  TSH is elevated but difficult to interpret in hospitalized patients.  May be stress related and would prefer any initiation of  chronic medications to her primary care provider in the outpatient setting.  Note her free T4 is normal.  She has pancytopenia today which likely is chronic.  Suspect may be a little dry and hemoconcentrated yesterday on admission.  Will stop IV fluids, monitor overnight repeat labs in a.m.  BS erratic.  Monitor.       · SCDs for DVT prophylaxis.  · Full code.  · Discussed with patient and care team on multidisciplinary rounds.  · Anticipate discharge home tomorrow.      Ed Spencer MD  [facility]  02/09/22  19:02 EST

## 2022-02-10 NOTE — CASE MANAGEMENT/SOCIAL WORK
Continued Stay Note  [facility]     Patient Name: Silvia Zabaal  MRN: [id number]  Today's Date: 2/10/2022    Admit Date: 2/8/2022     Discharge Plan     Row Name 02/10/22 0855       Plan    Plan Home with sister to assist. Spouse on CVU post CABG. Pt's sister will be staying with pt and her spouse to assist. Transport by private auto.    Patient/Family in Agreement with Plan yes    Plan Comments Met with pt at bedside to discuss D/C plan. Pt states she has all needed DME and does not want HH or SNF. States her spouse is on CVU post CABG. Pt states her sister came from out of town to stay with them to assist with recovery. Pt states she no longer drives due to her ammonia levels. States her sister will transport her home. Kevin Greene RN-BC               Discharge Codes    No documentation.               Expected Discharge Date and Time     Expected Discharge Date Expected Discharge Time    Feb 10, 2022             Kevin Greene RN

## 2022-02-11 ENCOUNTER — TRANSITIONAL CARE MANAGEMENT TELEPHONE ENCOUNTER (OUTPATIENT)
Dept: CALL CENTER | Facility: HOSPITAL | Age: 62
End: 2022-02-11

## 2022-02-11 NOTE — OUTREACH NOTE
Call Center TCM Note      Responses   [facility] patient discharged from? [place]   Does the patient have one of the following disease processes/diagnoses(primary or secondary)? Other   TCM attempt successful? Yes   Call start time 1057   Call end time 1102   Discharge diagnosis Near syncope  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    Is patient permission given to speak with other caregiver? Yes   Person spoke with today (if not patient) and relationship     Meds reviewed with patient/caregiver? Yes   Is the patient having any side effects they believe may be caused by any medication additions or changes? No   Does the patient have all medications ordered at discharge? N/A   Prescription comments no new meds   Is the patient taking all medications as directed (includes completed medication regime)? Yes   Does the patient have a primary care provider?  Yes   Does the patient have an appointment with their PCP within 7 days of discharge? No   Comments regarding PCP I could not find availablility in TCM timeframe other than appt for today. Patient declined today appt. Message sent to office to have them call patient with appt.    What is preventing the patient from scheduling follow up appointments within 7 days of discharge? Haven't had time   Nursing Interventions Educated patient on importance of making appointment,  Advised patient to make appointment   Has the patient kept scheduled appointments due by today? N/A   Has home health visited the patient within 72 hours of discharge? N/A   Psychosocial issues? No   Did the patient receive a copy of their discharge instructions? Yes   Nursing interventions Reviewed instructions with patient   What is the patient's perception of their health status since discharge? Improving   Is the patient/caregiver able to teach back signs and symptoms related to disease process for when to call PCP? Yes   Is the patient/caregiver able to teach back signs and symptoms  related to disease process for when to call 911? Yes   Is the patient/caregiver able to teach back the hierarchy of who to call/visit for symptoms/problems? PCP, Specialist, Home health nurse, Urgent Care, ED, 911 Yes   If the patient is a current smoker, are they able to teach back resources for cessation? Not a smoker   TCM call completed? Yes          Masood Rey RN    2/11/2022, 11:02 EST

## 2022-02-15 ENCOUNTER — TELEPHONE (OUTPATIENT)
Dept: ENDOCRINOLOGY | Age: 62
End: 2022-02-15

## 2022-02-16 RX ORDER — INSULIN ASPART 100 [IU]/ML
INJECTION, SOLUTION INTRAVENOUS; SUBCUTANEOUS
Qty: 60 ML | Refills: 2 | Status: SHIPPED | OUTPATIENT
Start: 2022-02-16 | End: 2022-04-12 | Stop reason: SDUPTHER

## 2022-02-21 ENCOUNTER — READMISSION MANAGEMENT (OUTPATIENT)
Dept: CALL CENTER | Facility: HOSPITAL | Age: 62
End: 2022-02-21

## 2022-02-21 NOTE — OUTREACH NOTE
Medical Week 2 Survey      Responses   [facility] patient discharged from? [place]   Does the patient have one of the following disease processes/diagnoses(primary or secondary)? Other   Week 2 attempt successful? Yes   Call start time 0952   Discharge diagnosis Near syncope  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    Rescheduled Rescheduled-pt requested  [Spoke with , Jose, he wanted his wife called. Called times 2 no answer. ]   Call end time 0953   Is patient permission given to speak with other caregiver? Yes   Week 2 Call Completed? Yes          Laura Jeronimo RN

## 2022-02-24 ENCOUNTER — READMISSION MANAGEMENT (OUTPATIENT)
Dept: CALL CENTER | Facility: HOSPITAL | Age: 62
End: 2022-02-24

## 2022-02-24 NOTE — OUTREACH NOTE
Medical Week 2 Survey      Responses   [facility] [place] patient discharged from? [place]   Does the patient have one of the following disease processes/diagnoses(primary or secondary)? Other   Week 2 attempt successful? Yes   Call start time 1112   General alerts for this patient Sister in law states pt does not answer the phone because talking makes her vomit.    Discharge diagnosis Near syncope  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    Call end time 1117   Is patient permission given to speak with other caregiver? Yes   Person spoke with today (if not patient) and relationship     Does the patient have a primary care provider?  Yes   Comments Sister in law states she does not think pt has been to PCP.    Has home health visited the patient within 72 hours of discharge? N/A   Psychosocial issues? No   Did the patient receive a copy of their discharge instructions? Yes   What is the patient's perception of their health status since discharge? Worsening  [Still having the vomiting.  and sister in law trying to get her to go back to hospital]   Is the patient/caregiver able to teach back signs and symptoms related to disease process for when to call PCP? Yes   Is the patient/caregiver able to teach back signs and symptoms related to disease process for when to call 911? Yes   Is the patient/caregiver able to teach back the hierarchy of who to call/visit for symptoms/problems? PCP, Specialist, Home health nurse, Urgent Care, ED, 911 Yes   If the patient is a current smoker, are they able to teach back resources for cessation? Not a smoker   Week 2 Call Completed? Yes   Wrap up additional comments Spoke with  who passed phone to her sister in law. Worried about pt. States pt. is still vomiting and not any better, depressed that she may not get a liver transplant. Advised to call PCP or take pt back to ED for re -evaluations. States she will try to get her to go.           Blanca Vanegas RN

## 2022-03-02 ENCOUNTER — READMISSION MANAGEMENT (OUTPATIENT)
Dept: CALL CENTER | Facility: HOSPITAL | Age: 62
End: 2022-03-02

## 2022-03-02 NOTE — OUTREACH NOTE
Medical Week 3 Survey      Responses   [facility] patient discharged from? [place]   Does the patient have one of the following disease processes/diagnoses(primary or secondary)? Other   Week 3 attempt successful? No   Unsuccessful attempts Attempt 1          Pam Yusuf RN

## 2022-03-03 ENCOUNTER — OFFICE VISIT (OUTPATIENT)
Dept: INTERNAL MEDICINE | Age: 62
End: 2022-03-03

## 2022-03-03 VITALS
TEMPERATURE: 97.5 F | SYSTOLIC BLOOD PRESSURE: 116 MMHG | DIASTOLIC BLOOD PRESSURE: 54 MMHG | OXYGEN SATURATION: 99 % | WEIGHT: 180 LBS | BODY MASS INDEX: 28.25 KG/M2 | HEIGHT: 67 IN | HEART RATE: 83 BPM

## 2022-03-03 DIAGNOSIS — R55 NEAR SYNCOPE: Primary | ICD-10-CM

## 2022-03-03 PROBLEM — R14.0 BLOATING: Status: ACTIVE | Noted: 2018-04-30

## 2022-03-03 PROBLEM — R10.13 ABDOMINAL PAIN, EPIGASTRIC: Status: ACTIVE | Noted: 2018-03-27

## 2022-03-03 PROBLEM — G43.909 MIGRAINE HEADACHE: Status: ACTIVE | Noted: 2018-10-11

## 2022-03-03 PROBLEM — K92.0 HEMATEMESIS: Status: ACTIVE | Noted: 2017-02-15

## 2022-03-03 PROCEDURE — 99213 OFFICE O/P EST LOW 20 MIN: CPT | Performed by: NURSE PRACTITIONER

## 2022-03-03 RX ORDER — INSULIN DETEMIR 100 [IU]/ML
INJECTION, SOLUTION SUBCUTANEOUS
COMMUNITY
Start: 2022-01-31 | End: 2022-08-19 | Stop reason: SDUPTHER

## 2022-03-03 RX ORDER — MIRTAZAPINE 15 MG/1
15 TABLET, FILM COATED ORAL NIGHTLY
COMMUNITY

## 2022-03-03 NOTE — PROGRESS NOTES
"[facility] INTERNAL MEDICINE  JOEL Sher    Silvia STANFORD Vj / 61 y.o. / female  03/03/2022      CC:   Hospital Follow Up Visit (syncope)      VITALS    Visit Vitals  /54 (Cuff Size: Adult)   Pulse 83   Temp 97.5 °F (36.4 °C) (Temporal)   Ht 170.2 cm (67\")   Wt 81.6 kg (180 lb)   LMP  (LMP Unknown)   SpO2 99%   BMI 28.19 kg/m²       BP Readings from Last 3 Encounters:   03/03/22 116/54   02/10/22 128/69   12/30/21 140/62     Wt Readings from Last 3 Encounters:   03/03/22 81.6 kg (180 lb)   02/10/22 85.1 kg (187 lb 9.8 oz)   12/30/21 82.7 kg (182 lb 6.4 oz)      Body mass index is 28.19 kg/m².    HPI:     Date of admission/discharge: 8/20/2022 to February 10, 2022   Hospital: [facility]  Principle Dx: near syncope   Secondary Dx: Abnormal TSH, liver cirrhosis secondary to Wong, portal hypertension, type 2 diabetes, rheumatoid arthritis  History prior to hospitalization: Presented to emergency room initially complaining of a near syncopal episode.  Evaluation/Treatment: Monitored on telemetry with no abnormal arrhythmias.  Cardiology consult with echocardiogram unremarkable.  Carotid Dopplers unremarkable.  Elevated TSH but normal free T4.  Recommending repeat in outpatient setting.  Chronic pancytopenia continue to be seen due to liver cirrhosis.  No further episodes of near syncope during hospitalization.  Negative chest x-ray.   Course: Today patient is mostly back to her baseline with no further episodes of near syncope.     She has follow-up with endocrinology along with gastroenterology in early April.     Our lab is closed today she is too tired to proceed to hospital lab for thyroid recheck.    Patient Care Team:  Kulwant Martínez APRN as PCP - General (Internal Medicine)  Sukhdeep Mcdowell MD as Consulting Physician (Hematology and Oncology)  Merary Burnett MD as Consulting Physician (Endocrinology)  Kimberly Gray MD as Consulting Physician (Gastroenterology)  Joe Munoz " MD Ladan as Consulting Physician (Rheumatology)  Melinda Thakkar MD as Consulting Physician (Obstetrics and Gynecology)  Silvestre Montes MD PhD as Consulting Physician (Gastroenterology)  Mina Gonzalez MD as Referring Physician (Hospitalist)  ____________________________________________________________________    ASSESSMENT & PLAN:    1. Near syncope      No orders of the defined types were placed in this encounter.      Summary/Discussion:  · Unremarkable echocardiogram, carotid ultrasound, chest x-ray.  No new episodes of near syncope  · Continue management with specialty team  · We will reach out to endocrinology to see if they can check thyroid with other labs on March 25    Return for Next scheduled follow up.    ____________________________________________________________________    REVIEW OF SYSTEMS    Review of Systems  Constitutional fatigue   Resp neg  CV neg  GI nausea    PHYSICAL EXAMINATION    Physical Exam  Constitutional  No distress  Cardiovascular Rate  normal . Rhythm: regular . Heart sounds:  normal  Pulmonary/Chest  Effort normal. Breath sounds:  normal      REVIEWED DATA:    Labs:   No visits with results within 14 Day(s) from this visit.   Latest known visit with results is:   No results displayed because visit has over 200 results.          Imaging:   Adult Transthoracic Echo Complete W/ Cont if Necessary Per Protocol    Result Date: 2/9/2022  Narrative: · Estimated left ventricular EF = 70% Left ventricular systolic function is normal. · Left ventricular diastolic function was normal. · Left atrial volume is mildly increased.      XR Chest 1 View    Result Date: 2/9/2022  Narrative: SINGLE VIEW CHEST RADIOGRAPH  HISTORY: 61-year-old female with a history of dyspnea.  FINDINGS: A portable AP chest radiograph was obtained. Comparison is made to a chest radiograph dated 09/20/2021. The lungs are normal in volume and are clear. The cardiac silhouette is within normal limits. No bony  "abnormality of the chest is appreciated.      Impression: Negative chest radiograph.  This report was finalized on 2/9/2022 4:37 PM by Dr. Gee Reyna M.D.      Duplex Carotid Ultrasound CAR    Result Date: 2/9/2022  Narrative: · Proximal right internal carotid artery is normal. · Proximal left internal carotid artery is normal.         Medical Tests:        Summary of old records / correspondence / consultant report:   DC summary re: issues addressed on HPI    Request outside records:       ALLERGIES  Allergies   Allergen Reactions   • Albuterol Anaphylaxis   • Imitrex [Sumatriptan] Anaphylaxis   • Tramadol Nausea Only, Other (See Comments) and GI Intolerance     Per pt causes her \"palsy, meaning shaking and tremors\"    • Coconut (Cocos Nucifera) Allergy Skin Test Nausea And Vomiting   • Nsaids Other (See Comments)     Told by MD not to take due to liver problems   • Tylenol [Acetaminophen] Other (See Comments)     Has liver problems and told by MD to not take   • Zofran [Ondansetron Hcl] Other (See Comments)     Pt states does not work to correct nausea and vomiting.    • Codeine Nausea Only, Rash and GI Intolerance   • Lactulose Nausea And Vomiting and Other (See Comments)     Severe abdominal pain   • Quinine Derivatives Nausea And Vomiting        MEDICATIONS   Current Outpatient Medications   Medication Sig Dispense Refill   • mirtazapine (REMERON) 15 MG tablet Take 15 mg by mouth Every Night.     • ALPRAZolam (XANAX) 1 MG tablet Take 0.5-1 mg by mouth As Needed for Anxiety.     • Cholecalciferol (Vitamin D3) 25 MCG (1000 UT) capsule Take 1,000 Units by mouth Daily.     • Docusate Sodium (COLACE PO) Take 2 capsules by mouth every night at bedtime.     • ferrous sulfate 325 (65 FE) MG tablet TAKE ONE TABLET BY MOUTH TWICE A DAY (Patient taking differently: Take 325 mg by mouth Daily With Breakfast.) 60 tablet 2   • FLUoxetine (PROzac) 10 MG capsule Take 20 mg by mouth Daily.     • hydrOXYzine (ATARAX) 25 MG " tablet Take 2 tablets by mouth Every Night. (Patient taking differently: Take 50 mg by mouth 2 (Two) Times a Day.) 60 tablet 3   • insulin aspart (NovoLOG FlexPen) 100 UNIT/ML solution pen-injector sc pen INJECT 60 UNITS UNDER THE SKIN 3 TIME DAILY 60 mL 2   • insulin detemir (LEVEMIR) 100 UNIT/ML injection Inject 65 Units under the skin into the appropriate area as directed 2 (Two) Times a Day.     • Levemir FlexTouch 100 UNIT/ML injection      • levETIRAcetam (KEPPRA) 500 MG tablet TAKE ONE TABLET BY MOUTH TWICE A DAY (Patient taking differently: Take 500 mg by mouth 2 (Two) Times a Day.) 180 tablet 1   • Magnesium Oxide 400 MG capsule Take 400 mg by mouth Every Night. 90 each 1   • metaxalone (Skelaxin) 800 MG tablet Take 800 mg by mouth 3 (Three) Times a Day.     • metoclopramide (REGLAN) 10 MG tablet Take 1 tablet by mouth 4 (Four) Times a Day Before Meals & at Bedtime. 120 tablet 1   • Multiple Vitamins-Minerals (MULTIVITAMIN WITH MINERALS) tablet tablet Take 1 tablet by mouth Daily.     • Narcan 4 MG/0.1ML nasal spray 1 spray into the nostril(s) as directed by provider As Needed.     • Oxaydo 7.5 MG tablet Take 7.5 mg by mouth 2 (Two) Times a Day.     • polyethylene glycol (MIRALAX) powder Take 17 g by mouth Daily As Needed.     • pregabalin (LYRICA) 75 MG capsule Take 75 mg by mouth 2 (Two) Times a Day.     • promethazine (PHENERGAN) 25 MG tablet Take 25 mg by mouth Every 8 (Eight) Hours As Needed for Nausea or Vomiting.     • riFAXIMin (Xifaxan) 550 MG tablet Take 1 tablet by mouth 2 (Two) Times a Day. 180 tablet 1   • sertraline (ZOLOFT) 50 MG tablet Take 50 mg by mouth Daily.     • spironolactone (ALDACTONE) 100 MG tablet TAKE ONE TABLET BY MOUTH DAILY (Patient taking differently: Take 100 mg by mouth Daily As Needed (swelling). For swelling) 30 tablet 2   • vitamin B-12 (CYANOCOBALAMIN) 1000 MCG tablet Take 1,000 mcg by mouth Daily.     • zinc gluconate 50 MG tablet Take 50 mg by mouth Daily.     •  zolpidem (AMBIEN) 5 MG tablet Take 5 mg by mouth Every Night.       No current facility-administered medications for this visit.       Current outpatient and discharge medications have been reconciled for the patient.  Reviewed by: JOEL Sher       [facility]:     The following portions of the patient's history were reviewed and updated as appropriate: Allergies / Current Medications / Past Medical History / Surgical History / Social History / Family History    PROBLEM LIST   Patient Active Problem List   Diagnosis   • Cirrhosis of liver (HCC)   • Arthritis   • Anxiety and depression   • Type 2 diabetes mellitus (HCC)   • Fibrositis   • Change in blood platelet count   • Pancytopenia (HCC)   • Hypersplenism   • Nausea & vomiting   • DUKES (nonalcoholic steatohepatitis)   • Hyperlipidemia   • Abdominal pain   • Hematemesis   • Ascites   • Portal hypertension (HCC)   • Systemic lupus erythematosus (HCC)   • Secondary esophageal varices without bleeding (HCC)   • Esophageal varices without bleeding (HCC)   • Gastroparesis   • 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 (HCC)   • Diabetic ketoacidosis without coma associated with type 2 diabetes mellitus (HCC)   • Diabetic peripheral neuropathy (HCC)   • History of seizure disorder   • Hepatic encephalopathy (HCC)   • Thrombocytopenia (HCC)   • Fever   • Immune thrombocytopenic purpura (HCC)   • Degeneration of lumbosacral intervertebral disc   • Spondylosis without myelopathy   • UGI bleed   • Migraine headache   • Retention of urine   • Uncontrolled diabetes mellitus with hyperglycemia (HCC)   • Obstructive sleep apnea syndrome   • Gastroparesis   • Hyperammonemia (HCC)   • Hyponatremia   • Anemia   • Vitamin D deficiency   • Hyperglycemia   • Iron deficiency anemia   • Adverse effect of iron and its compounds, sequela   • Hemorrhage of anus and rectum   • Lesion of vulva   • Hematemesis   • Acute blood loss anemia   • Acute hyperkalemia   • Labial cyst   • Transaminitis   •  Hyperbilirubinemia   • Acute gastritis   • Bacterial urinary infection   • UTI (urinary tract infection)   • Cholestatic pruritus   • Fatty liver disease, nonalcoholic   • Protein-calorie malnutrition, moderate (HCC)   • Hyperglycemia due to type 1 diabetes mellitus (HCC)   • Ascites   • Hospital discharge follow-up   • Near syncope   • Abnormal TSH   • Abdominal pain, epigastric   • Bloating   • Fibromyalgia       PAST MEDICAL HISTORY  Past Medical History:   Diagnosis Date   • Anemia    • Anxiety    • Arthritis    • Broken shoulder     left shoulder    • Chromosomal abnormality     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   • Cirrhosis (HCC)    • Clostridium difficile infection    • Colon polyps    • Depression    • Diabetes mellitus (HCC)     Adult onset, insulin requiring   • Duodenal ulcer with hemorrhage    • Esophageal varices determined by endoscopy (HCC)    • Fibromyalgia    • Gallbladder disease    • Gastric varices    • Gastroparesis    • Glaucoma    • Granuloma annulare    • H/O B12 deficiency    • H/O Bleeding ulcer     And gastroesophageal varices   • H/O mixed connective tissue disorder    • Hemorrhoids    • Hiatal hernia    • History of transfusion     needs bendryl  flushes sensation and temp goes up   • Hx of being hospitalized     In [place] for GI bleeding from ulcer and varices   • Hyperlipidemia    • Migraine    • Mitral valve prolapse    • DUKES (nonalcoholic steatohepatitis) 11/2016   • Orthostatic hypotension 02/2019   • BRICE (obstructive sleep apnea)    • Pancreas divisum    • Pancytopenia (HCC)     Chronic, due to cirrhosis and hypersplenism   • Peptic ulcer disease     And esophageal varices   • PONV (postoperative nausea and vomiting)    • RA (rheumatoid arthritis) (HCC)    • Seizure disorder (HCC)     last 2003   • Seizures (HCC)    • Systemic lupus (HCC)    • TIA (transient ischemic attack) 1984       SURGICAL  HISTORY  Past Surgical History:   Procedure Laterality Date   • BARTHOLIN CYST MARSUPIALIZATION Left 1/18/2021    Procedure: EXCISION OF LABIAL CYST;  Surgeon: Melinda Thakkar MD;  Location: [facility] OR;  Service: Obstetrics/Gynecology;  Laterality: Left;   • BLADDER REPAIR  1991   • CATARACT EXTRACTION     • CHOLECYSTECTOMY  1994   • COLONOSCOPY     • ENDOSCOPY N/A 11/7/2016    Procedure: ESOPHAGOGASTRODUODENOSCOPY WITH COLD POLYPECTOMY, BANDING,  AND CLIPS TIMES 2;  Surgeon: Rich Coleman MD;  Location: [facility] ENDOSCOPY;  Service:    • ENDOSCOPY N/A 12/22/2016    Procedure: ESOPHAGOGASTRODUODENOSCOPY WITH ESOPHAGEAL BANDING. 5 BANDS FIRED, 4 BANDS ADHERERD TO MUCOSA;  Surgeon: Rich Coleman MD;  Location: [facility] ENDOSCOPY;  Service:    • ENDOSCOPY N/A 2/15/2017    Procedure: ESOPHAGOGASTRODUODENOSCOPY AT BEDSIDE with esophageal varices banding (3);  Surgeon: Rich Coleman MD;  Location: [facility] ENDOSCOPY;  Service:    • ENDOSCOPY N/A 4/6/2017    Procedure: ESOPHAGOGASTRODUODENOSCOPY WITH ESOPHAGEAL VARICES BANDING x2;  Surgeon: Rich Coleman MD;  Location: [facility] ENDOSCOPY;  Service:    • ENDOSCOPY N/A 11/24/2017    Procedure: ESOPHAGOGASTRODUODENOSCOPY with biopsies;  Surgeon: Rich Coleman MD;  Location: [facility] ENDOSCOPY;  Service:    • ENDOSCOPY N/A 10/5/2018    Procedure: ESOPHAGOGASTRODUODENOSCOPY;  Surgeon: Rich Coleman MD;  Location: [facility] ENDOSCOPY;  Service: Gastroenterology   • ENDOSCOPY N/A 5/10/2019    Procedure: ESOPHAGOGASTRODUODENOSCOPY AT BEDSIDE WITH VARICEAL LIGATION;  Surgeon: Rich Coleman MD;  Location: [facility] ENDOSCOPY;  Service: Gastroenterology   • ENDOSCOPY N/A 6/28/2019    Procedure: ESOPHAGOGASTRODUODENOSCOPY WITH ESOPHAGEAL BANDING (3 BANDS);  Surgeon: Rich Coleman MD;  Location: [facility] ENDOSCOPY;  Service: Gastroenterology   • ENDOSCOPY N/A 4/3/2021    Procedure: ESOPHAGOGASTRODUODENOSCOPY WITH COLD BIOPSIES & BRUSHINGS;  Surgeon: Endy Vasquez MD;   "Location: [facility] ENDOSCOPY;  Service: Gastroenterology;  Laterality: N/A;  PRE- N,V & DYSPHAGIA  POST- GASTRITIS, POSSIBLE FUNGAL ESOPHAGITIS   • ENDOSCOPY AND COLONOSCOPY      Dr. Rich Coleman with findings of candida esophagitis   • EYE SURGERY     • HYSTERECTOMY  1986    partial   • JOINT REPLACEMENT     • KNEE SURGERY Right     \"right knee recontstruction\"   • LIVER BIOPSY  2015   • MASS EXCISION     • STOMACH SURGERY     • TIPS PROCEDURE  2020    x2       SOCIAL HISTORY  Social History     Socioeconomic History   • Marital status:      Spouse name: Jose   Tobacco Use   • Smoking status: Former Smoker     Packs/day: 1.00     Years: 4.00     Pack years: 4.00     Types: Cigarettes     Quit date: 2015     Years since quittin.2   • Smokeless tobacco: Never Used   Vaping Use   • Vaping Use: Never used   Substance and Sexual Activity   • Alcohol use: No   • Drug use: Never   • Sexual activity: Defer       FAMILY HISTORY  Family History   Problem Relation Age of Onset   • Liver disease Other    • Depression Other    • Heart disease Other    • Hypertension Other    • Diabetes Other    • Breast cancer Other    • Brain cancer Other    • Uterine cancer Other    • Colon cancer Other    • Leukemia Other    • Colon cancer Maternal Aunt    • Hypertension Mother    • Diabetes type II Mother    • Rheum arthritis Mother    • Liver disease Mother         DUKES   • Heart disease Father    • Diabetes type II Father    • Diabetes type II Sister    • Lupus Sister    • Malig Hyperthermia Neg Hx        Examiner was wearing KN95 mask, face shield and exam gloves during the entire duration of the visit. Patient was masked the entire time.   Minimum social distance of 6 ft maintained entire visit except if physical contact was necessary as documented.     **Dragon Disclaimer:   Much of this encounter note is an electronic transcription/translation of spoken language to printed text. The electronic translation " of spoken language may permit erroneous, or at times, nonsensical words or phrases to be inadvertently transcribed. Although I have reviewed the note for such errors, some may still exist.

## 2022-03-04 ENCOUNTER — READMISSION MANAGEMENT (OUTPATIENT)
Dept: CALL CENTER | Facility: HOSPITAL | Age: 62
End: 2022-03-04

## 2022-03-04 NOTE — OUTREACH NOTE
Medical Week 3 Survey      Responses   [facility] patient discharged from? [place]   Does the patient have one of the following disease processes/diagnoses(primary or secondary)? Other   Week 3 attempt successful? No   Unsuccessful attempts Attempt 2   Rescheduled Revoked          Meenakshi Marti RN

## 2022-03-07 DIAGNOSIS — E11.65 TYPE 2 DIABETES MELLITUS WITH HYPERGLYCEMIA, WITH LONG-TERM CURRENT USE OF INSULIN: Primary | ICD-10-CM

## 2022-03-07 DIAGNOSIS — Z79.4 TYPE 2 DIABETES MELLITUS WITH HYPERGLYCEMIA, WITH LONG-TERM CURRENT USE OF INSULIN: Primary | ICD-10-CM

## 2022-03-25 ENCOUNTER — LAB (OUTPATIENT)
Dept: ENDOCRINOLOGY | Age: 62
End: 2022-03-25

## 2022-03-25 DIAGNOSIS — Z79.4 TYPE 2 DIABETES MELLITUS WITH HYPERGLYCEMIA, WITH LONG-TERM CURRENT USE OF INSULIN: ICD-10-CM

## 2022-03-25 DIAGNOSIS — E11.65 TYPE 2 DIABETES MELLITUS WITH HYPERGLYCEMIA, WITH LONG-TERM CURRENT USE OF INSULIN: ICD-10-CM

## 2022-03-26 LAB
T3FREE SERPL-MCNC: 2.3 PG/ML (ref 2–4.4)
T4 FREE SERPL-MCNC: 1.13 NG/DL (ref 0.82–1.77)
TSH SERPL DL<=0.005 MIU/L-ACNC: 2.64 UIU/ML (ref 0.45–4.5)

## 2022-03-28 ENCOUNTER — TELEPHONE (OUTPATIENT)
Dept: ONCOLOGY | Facility: CLINIC | Age: 62
End: 2022-03-28

## 2022-03-28 ENCOUNTER — APPOINTMENT (OUTPATIENT)
Dept: LAB | Facility: HOSPITAL | Age: 62
End: 2022-03-28

## 2022-03-28 NOTE — TELEPHONE ENCOUNTER
Provider: DR REHMAN  Caller: VERNA  Relationship to Patient: SELF    Reason for Call: VERNA FELL GETTING INTO THE SHOWER AND NEEDS TO CANCEL HER LABS FOR TODAY, WILL CALL BACK TO R/S

## 2022-03-29 RX ORDER — RIFAXIMIN 550 MG/1
TABLET ORAL
Qty: 60 TABLET | Refills: 1 | Status: SHIPPED | OUTPATIENT
Start: 2022-03-29

## 2022-04-04 ENCOUNTER — OFFICE VISIT (OUTPATIENT)
Dept: ONCOLOGY | Facility: CLINIC | Age: 62
End: 2022-04-04

## 2022-04-04 ENCOUNTER — APPOINTMENT (OUTPATIENT)
Dept: LAB | Facility: HOSPITAL | Age: 62
End: 2022-04-04

## 2022-04-04 DIAGNOSIS — D61.818 PANCYTOPENIA: Primary | ICD-10-CM

## 2022-04-04 DIAGNOSIS — K75.81 NASH (NONALCOHOLIC STEATOHEPATITIS): ICD-10-CM

## 2022-04-04 DIAGNOSIS — D69.3 ACUTE ITP: ICD-10-CM

## 2022-04-04 DIAGNOSIS — K74.60 LIVER CIRRHOSIS SECONDARY TO NASH: ICD-10-CM

## 2022-04-04 DIAGNOSIS — D50.9 IRON DEFICIENCY ANEMIA, UNSPECIFIED IRON DEFICIENCY ANEMIA TYPE: ICD-10-CM

## 2022-04-04 DIAGNOSIS — K75.81 LIVER CIRRHOSIS SECONDARY TO NASH: ICD-10-CM

## 2022-04-04 DIAGNOSIS — D73.1 HYPERSPLENISM: ICD-10-CM

## 2022-04-04 DIAGNOSIS — D69.6 THROMBOCYTOPENIA: ICD-10-CM

## 2022-04-04 DIAGNOSIS — I85.11 SECONDARY ESOPHAGEAL VARICES WITH BLEEDING: ICD-10-CM

## 2022-04-04 PROCEDURE — 99441 PR PHYS/QHP TELEPHONE EVALUATION 5-10 MIN: CPT | Performed by: INTERNAL MEDICINE

## 2022-04-04 NOTE — PROGRESS NOTES
This visit has been rescheduled as a phone visit to comply with patient safety concerns in accordance with CDC recommendations. Total time of discussion was 8 minutes.  You have chosen to receive care through a telephone visit. Do you consent to use a telephone visit for your medical care today? Yes  REASON FOR FOLLOWUP:    1. 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   2. Evidence of B12 deficiency on her initial lab evaluation in April 2013.  Patient was started on parenteral B12 replacement.  The patient was switched from shots to daily oral B12 replacement after the visit of 12/02/2013.    3. Hemoglobin has significantly improved since banding of her esophageal varices and increase in her oral iron supplement to twice daily dosing.  4. Morphologically normal bone marrow from 08/20/2013 with normal flow cytometry.  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  Significance of this is uncertain.    5. Mixed connective tissue disorder, currently on Enbrel therapy.  6. Insulin-requiring diabetes mellitus.  7. Patient continues to follow-up periodically with the transplant team at [facility] but currently she is not on the transplant list.  8. 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 Rituxan therapy weekly ×4 completed 7/16/2018     HISTORY OF PRESENT ILLNESS:  Silvia is a 61 y.o. 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  She has had GI bleeding in the past due to portal hypertension.      She also developed acute ITP and received Rituxan weekly ×4.  She completed her Rituxan treatment on 7/16/2018.  She had a good response.  Her platelet count did not correct to normal but did return to her baseline platelet count of around 60,000-90,000.    She also had received 2 doses of IV iron therapy in the form of  Injectafer 750 mg each dose delivered on 1/29/2019 and 2/13/2019.      Silvia again required hospitalization at [facility] from 5/28/2019 to 5/30/2019 due to symptomatic edema requiring diuretic therapy.  She also had been hospitalized at [facility] from 5/9/2019 to 5/14/2019 and on that admission she underwent an esophageal varices banding procedure on 5/10/2019.    She received additional IV Injectafer on 6/26/2019 and 7/3/2019.    She has been in [place] taking care of her dying father for several months and has not been seen in our office since July 2019.  Her father has since passed away and she returned to [place] In December 2020.    While in [place] she also was very ill due to her cirrhosis and portal hypertension.  She had bleeding esophageal varices on several occasions requiring admission and transfusions.  She ended up undergoing a TI PS procedure.    She is following up with Dr. Montes at [facility] regarding potential liver transplant.    She was scheduled to be seen in the office today but called and said she was not feeling well and requested to do a telephone visit.  We had intended for her to have her hematology labs drawn the week before the visit but the only labs that she had performed at that time were thyroid studies.    Past Medical History, Past Surgical History, Social History, Family History have been reviewed and are without significant changes except as mentioned.      Review of Systems   Constitutional: Positive for fatigue and fever. Negative for activity change.   HENT: Negative for mouth sores, nosebleeds and trouble swallowing.    Respiratory: Negative for cough, shortness of breath and wheezing.    Cardiovascular: Negative for chest pain and palpitations.   Gastrointestinal: Positive for constipation, nausea and vomiting. Negative for diarrhea.   Genitourinary: Negative for dysuria and hematuria.   Musculoskeletal: Negative for arthralgias and myalgias.        Muscle cramps   Skin: Negative  "for rash and wound.   Neurological: Negative for seizures and syncope.   Hematological: Negative for adenopathy. Bruises/bleeds easily.   Psychiatric/Behavioral: Positive for dysphoric mood. Negative for confusion.       Medications:  The current medication list was reviewed in the EMR    ALLERGIES:    Allergies   Allergen Reactions   • Albuterol Anaphylaxis   • Imitrex [Sumatriptan] Anaphylaxis   • Tramadol Nausea Only, Other (See Comments) and GI Intolerance     Per pt causes her \"palsy, meaning shaking and tremors\"    • Coconut (Cocos Nucifera) Allergy Skin Test Nausea And Vomiting   • Nsaids Other (See Comments)     Told by MD not to take due to liver problems   • Tylenol [Acetaminophen] Other (See Comments)     Has liver problems and told by MD to not take   • Zofran [Ondansetron Hcl] Other (See Comments)     Pt states does not work to correct nausea and vomiting.    • Codeine Nausea Only, Rash and GI Intolerance   • Lactulose Nausea And Vomiting and Other (See Comments)     Severe abdominal pain   • Quinine Derivatives Nausea And Vomiting              There were no vitals filed for this visit.     No physical exam performed today as this was a telephone visit    Physical Exam    CONSTITUTIONAL:  Vital signs reviewed.  No distress, looks comfortable.  EYES:  Conjunctiva and lids unremarkable.  PERRLA  EARS,NOSE,MOUTH,THROAT:  Ears and nose appear unremarkable.  Lips, teeth, gums appear unremarkable.  RESPIRATORY:  Normal respiratory effort.  Lungs clear to auscultation bilaterally.  CARDIOVASCULAR:  Normal S1, S2.  No murmurs rubs or gallops.  No significant lower extremity edema.  GASTROINTESTINAL: Abdomen appears distended with positive fluid wave   MUSCULOSKELETAL:  Unremarkable digits/nails.  No cyanosis or clubbing.  SKIN:  Warm.  No rashes.  PSYCHIATRIC: She seems depressed.  She is on Xifaxan for hepatic encephalopathy.    RECENT LABS:  No results found for: WBC, HGB, PLT  Lab Results   Component Value " Date    NEUTROABS 3.50 02/08/2022     Lab Results   Component Value Date    IRON 76 01/10/2022    TIBC 413 01/10/2022    FERRITIN 80.30 01/10/2022   SAT 23%    Lab Results   Component Value Date    GLUCOSE 202 (H) 02/10/2022    BUN 17 02/10/2022    CREATININE 0.84 02/10/2022    EGFRIFNONA 69 02/10/2022    EGFRIFAFRI 64 12/20/2021    BCR 20.2 02/10/2022    K 4.1 02/10/2022    CO2 21.6 (L) 02/10/2022    CALCIUM 8.6 02/10/2022    PROTENTOTREF 5.7 (L) 05/03/2021    ALBUMIN 3.90 02/08/2022    LABIL2 1.2 05/03/2021    AST 35 (H) 02/08/2022    ALT 37 (H) 02/08/2022             CT ABDOMEN PELVIS W CONTRAST- 4/29/2021  IMPRESSION:   1.  Cirrhotic liver morphology with sequela of portal hypertension  including portal venous dilatation and splenomegaly. Mild eccentric  hypoattenuation within the superior aspect of a TIPS is unchanged and  may represent nonocclusive thrombus.  2.  Distal esophageal and gastric wall thickening have slightly  improved, although, the stomach is poorly distended and not well  evaluated. Distal esophageal wall thickening can be seen with reflux  esophagitis. Consider follow-up upper endoscopy to exclude an underlying  lesion.  3.  No bowel obstruction or CT evidence of acute appendicitis.      ASSESSMENT/PLAN:     *ITP.   Her platelets were back at her baseline in the 50,000 - 100,000 range from hypersplenism.      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     *B12 deficiency.  On oral B12 since December 2013.  Last B12 level greater than 2000     *History of esophageal varices banding.  She since has undergone a TI PS procedure     *Iron deficiency anemia due to GI bleeding.  Continuing oral iron supplementation.    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  This is of unknown significance.     *Rheumatoid arthritis.        *Lupus     *Diabetes.    Severe hyperglycemia last week with blood sugar greater than 700.  This prompted a visit to the ER but she thankfully was not in ketoacidosis.      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      *Gastroparesis.  Improved with Reglan.  She is worried about developing tar dive dyskinesia.        Plan  1.  We have asked Silvia to continue her oral iron and B12 supplementation for now.    2.  She will be scheduled for MD follow-up appointment with labs including a CBC, iron panel, ferritin, serum chemistries and B12 level in 6 weeks.

## 2022-04-12 ENCOUNTER — TELEPHONE (OUTPATIENT)
Dept: INTERNAL MEDICINE | Age: 62
End: 2022-04-12

## 2022-04-12 ENCOUNTER — TELEPHONE (OUTPATIENT)
Dept: ENDOCRINOLOGY | Age: 62
End: 2022-04-12

## 2022-04-12 RX ORDER — INSULIN ASPART 100 [IU]/ML
INJECTION, SOLUTION INTRAVENOUS; SUBCUTANEOUS
Qty: 60 ML | Refills: 2 | Status: SHIPPED | OUTPATIENT
Start: 2022-04-12

## 2022-04-12 NOTE — TELEPHONE ENCOUNTER
Sent message to pharmacy for brand name NovoLog, do not substitute for aspart as it is not covered

## 2022-04-12 NOTE — TELEPHONE ENCOUNTER
Caller: Silvia Zabala    Relationship: Self    Best call back number: [phone]    What medication are you requesting: SOMETHING FOR YEAST INFECTIONS VAGINAL AND TOPICAL FOR HER LEGS AND UNDER HER BELLY.    What are your current symptoms: YEAST IS GETTING WORSE    How long have you been experiencing symptoms: THE LEG YEAST JUST CAME ON AND THE VAGINAL YEAST COMES AND GOES.    Have you had these symptoms before:    [x] Yes  [] No    Have you been treated for these symptoms before:   [x] Yes  [] No    If a prescription is needed, what is your preferred pharmacy and phone number: [facility] [address] - [address] AT [facility] - [phone]  - [phone] FX     Additional notes:

## 2022-04-14 ENCOUNTER — TELEPHONE (OUTPATIENT)
Dept: ENDOCRINOLOGY | Age: 62
End: 2022-04-14

## 2022-04-19 ENCOUNTER — TELEPHONE (OUTPATIENT)
Dept: GASTROENTEROLOGY | Facility: CLINIC | Age: 62
End: 2022-04-19

## 2022-04-19 NOTE — TELEPHONE ENCOUNTER
Faxed request received from [facility] for metoclopramide 10 mg.     Pt last seen 3/17/21.  Cancelled appt with Milagros Lawrence for 4/6/22.  Appears that pt also following with [facility] De[facility].     Denial faxed to [phone] - confirmation received.

## 2022-05-02 ENCOUNTER — OFFICE VISIT (OUTPATIENT)
Dept: INTERNAL MEDICINE | Age: 62
End: 2022-05-02

## 2022-05-02 VITALS
DIASTOLIC BLOOD PRESSURE: 68 MMHG | WEIGHT: 169.4 LBS | HEIGHT: 67 IN | OXYGEN SATURATION: 98 % | TEMPERATURE: 97.1 F | HEART RATE: 87 BPM | SYSTOLIC BLOOD PRESSURE: 130 MMHG | BODY MASS INDEX: 26.59 KG/M2

## 2022-05-02 DIAGNOSIS — E86.0 DEHYDRATION: Primary | ICD-10-CM

## 2022-05-02 DIAGNOSIS — K74.69 OTHER CIRRHOSIS OF LIVER: ICD-10-CM

## 2022-05-02 PROCEDURE — 99213 OFFICE O/P EST LOW 20 MIN: CPT | Performed by: NURSE PRACTITIONER

## 2022-05-02 RX ORDER — SCOLOPAMINE TRANSDERMAL SYSTEM 1 MG/1
1 PATCH, EXTENDED RELEASE TRANSDERMAL
COMMUNITY
Start: 2022-04-11 | End: 2022-05-11

## 2022-05-02 NOTE — PROGRESS NOTES
"[facility] INTERNAL MEDICINE  JOEL Sher    Silvia STANFORD Vj / 61 y.o. / female  05/02/2022      CC:   Dehydration (ER F/U)      VITALS    Visit Vitals  /68 (Cuff Size: Adult)   Pulse 87   Temp 97.1 °F (36.2 °C) (Temporal)   Ht 170.2 cm (67\")   Wt 76.8 kg (169 lb 6.4 oz)   LMP  (LMP Unknown)   SpO2 98%   BMI 26.53 kg/m²       BP Readings from Last 3 Encounters:   05/02/22 130/68   03/03/22 116/54   02/10/22 128/69     Wt Readings from Last 3 Encounters:   05/02/22 76.8 kg (169 lb 6.4 oz)   03/03/22 81.6 kg (180 lb)   02/10/22 85.1 kg (187 lb 9.8 oz)      Body mass index is 26.53 kg/m².    HPI:     Date of emergency room visit: April 28, 2022  Hospital: [facility]  Principle Dx: Dehydration  Secondary Dx: DUKES liver cirrhosis  History prior to hospitalization: History of DUKES with liver cirrhosis status post TIPS procedure and gastroparesis presented to the emergency room with weakness fatigue and ongoing nausea and vomiting with initial shortness of breath.  She was sent in the motility clinic.  Evaluation/Treatment: Chest x-ray was performed which showed normal heart size, no consolidation or atelectasis in either lung.  No pleural effusion.  Additional CT of the abdomen pelvis with contrast was performed showing nodular cirrhotic morphology of the liver with possible.  The distal TIP shunt.  She also had significant splenomegaly measuring 20 cm suggesting portal hypertension along with severe pancreatic atrophy.  Nonobstructive bowel gas, normal appendix, no focal inflammatory change in the abdomen or pelvis.  Lactate 2.4.  Course: Today she feels overall fatigued, but no intractable vomiting.  Appointment with Dr. Montes on Tuesday.     Patient Care Team:  Kulwant Martínez APRN as PCP - General (Internal Medicine)  Sukhdeep Mcdowell MD as Consulting Physician (Hematology and Oncology)  Merary Burnett MD as Consulting Physician (Endocrinology)  Kimberly Gray MD as Consulting Physician " (Gastroenterology)  Joe Munoz MD as Consulting Physician (Rheumatology)  Melinda Thakkar MD as Consulting Physician (Obstetrics and Gynecology)  Silvestre Montes MD PhD as Consulting Physician (Gastroenterology)  Mina Gonzalez MD as Referring Physician (Hospitalist)  ____________________________________________________________________    ASSESSMENT & PLAN:    1. Dehydration    2. Other cirrhosis of liver (HCC)      No orders of the defined types were placed in this encounter.      Summary/Discussion:  · Overall labs are minimally changed from her baseline.  · No work-up for further evaluation today.  Follow-up with gastroenterology as scheduled, follow-up with PCP as scheduled in June     No follow-ups on file.    ____________________________________________________________________    REVIEW OF SYSTEMS    Review of Systems  Constitutional fatigue   Resp shortness of breath baseline   CV neg  GI Nausea     PHYSICAL EXAMINATION    Physical Exam  Constitutional  No distress  Cardiovascular Rate  normal . Rhythm: regular . Heart sounds:  normal  Pulmonary/Chest  Effort normal. Breath sounds:  normal  GI mild distention   Psychiatric  Alert. Judgment and thought content normal. Mood normal     REVIEWED DATA:    Labs:   No visits with results within 14 Day(s) from this visit.   Latest known visit with results is:   Lab on 03/25/2022   Component Date Value Ref Range Status   • T3, Free 03/25/2022 2.3  2.0 - 4.4 pg/mL Final   • Free T4 03/25/2022 1.13  0.82 - 1.77 ng/dL Final   • TSH 03/25/2022 2.640  0.450 - 4.500 uIU/mL Final       Imaging:   XR Chest 2 View    Result Date: 4/28/2022  Narrative: INDICATION:  Shortness of breath. TECHNIQUE:  Frontal and lateral chest (three images). COMPARISON:  11/08/2021 XR Chest FINDINGS:   The cardiomediastinal silhouette is normal in size.  There is no consolidation or atelectasis in either lung.  There are no pleural effusions.  There is no pneumothorax.  No  acute osseous process.   IMPRESSION: No acute process. Signed by Ochoa Conroy MD ##### Final ##### Dictated by:    OCHOA CONROY, RAD-RAD Dictated DT/TM: 04/28/2022 12:29 pm Interpreted and electronically signed by:  OCHOA CONROY, RAD-RAD Signed DT/TM:  04/28/2022 2:19 pm    CT Abdomen Pelvis With Contrast    Result Date: 4/28/2022  Narrative: INDICATION:  Abdominal pain.  Shortness of breath.  Nausea and vomiting.  Symptoms for one day.  Additional History:  DM.  Encephalopathy.  Gastroparesis.  Lupus. DUKES.  Neuropathy. TECHNIQUE:  CT of the abdomen and pelvis was performed with intravenous contrast. Isovue 370 100 mL was administered intravenously.   Automated mA/kV exposure control was utilized and patient examination was performed in strict accordance with principles of ALARA. RADIATION AMOUNT:  1472.65 mGy-cm. COMPARISON:  05/28/2019 XR Abdomen with Chest; 05/21/2019 US Paracentesis; 02/23/2019 XR Pelvis; 09/18/2018 CT Abdomen Pelvis. FINDINGS: Abdomen: The lung bases are clear.  There is nodular cirrhotic morphology of the liver.. TIPS shunt noted.  Possible narrowing distal right of the TIPS shunt.  There is significant splenomegaly measuring nearly 20 cm in maximal dimensions.  There is severe pancreatic atrophy.  The adrenal glands, and kidneys demonstrate no acute pathology.  No hydronephrosis or perinephric stranding. There is no free air or lymph node enlargement.  Abdominal aorta is not aneurysmal.  There is enlargement tortuosity of the splenic venous vessels, mesenteric and portal venous vessels suggesting portal hypertension. Pelvis: There is no bowel wall thickening or obstruction.  Average scattered stool burden. The appendix is unremarkable.  There is no free fluid.  Lymph nodes are not enlarged.  Urinary bladder is unremarkable. Skeleton:   There are no acute fractures.  No suspicious bony lesions. IMPRESSION: Nodular cirrhotic morphology of the liver. Possible narrowing of the distal  "end of the TIP shunt. Significant splenomegaly measuring nearly 20 cm in maximal dimensions. Enlargement and tortuosity of the splenic venous vessels, mesenteric and portal venous vessels suggesting portal hypertension. Severe pancreatic atrophy. Nonobstructive bowel gas pattern with normal appendix. No focal inflammatory change of the abdomen or pelvis. Signed by Richard Persaud MD ##### Final ##### Dictated by:    RICHARD PERSAUD MD-RAD Dictated DT/TM: 04/28/2022 4:11 pm Interpreted and electronically signed by:  RICHARD PERSAUD MD-RAD Signed DT/TM:  04/28/2022 5:02 pm       Medical Tests:        Summary of old records / correspondence / consultant report:   DC summary re: issues addressed on HPI    Request outside records:       ALLERGIES  Allergies   Allergen Reactions   • Albuterol Anaphylaxis   • Imitrex [Sumatriptan] Anaphylaxis   • Tramadol Nausea Only, Other (See Comments) and GI Intolerance     Per pt causes her \"palsy, meaning shaking and tremors\"    • Coconut (Cocos Nucifera) Allergy Skin Test Nausea And Vomiting   • Nsaids Other (See Comments)     Told by MD not to take due to liver problems   • Tylenol [Acetaminophen] Other (See Comments)     Has liver problems and told by MD to not take   • Zofran [Ondansetron Hcl] Other (See Comments)     Pt states does not work to correct nausea and vomiting.    • Codeine Nausea Only, Rash and GI Intolerance   • Lactulose Nausea And Vomiting and Other (See Comments)     Severe abdominal pain   • Quinine Derivatives Nausea And Vomiting        MEDICATIONS   Current Outpatient Medications   Medication Sig Dispense Refill   • ALPRAZolam (XANAX) 1 MG tablet Take 0.5-1 mg by mouth As Needed for Anxiety.     • Cholecalciferol (Vitamin D3) 25 MCG (1000 UT) capsule Take 1,000 Units by mouth Daily.     • Docusate Sodium (COLACE PO) Take 2 capsules by mouth every night at bedtime.     • ferrous sulfate 325 (65 FE) MG tablet TAKE ONE TABLET BY MOUTH TWICE A DAY " (Patient taking differently: Take 325 mg by mouth Daily With Breakfast.) 60 tablet 2   • FLUoxetine (PROzac) 10 MG capsule Take 20 mg by mouth Daily.     • hydrOXYzine (ATARAX) 25 MG tablet Take 2 tablets by mouth Every Night. (Patient taking differently: Take 50 mg by mouth 2 (Two) Times a Day.) 60 tablet 3   • insulin detemir (LEVEMIR) 100 UNIT/ML injection Inject 65 Units under the skin into the appropriate area as directed 2 (Two) Times a Day.     • Levemir FlexTouch 100 UNIT/ML injection      • levETIRAcetam (KEPPRA) 500 MG tablet TAKE ONE TABLET BY MOUTH TWICE A DAY (Patient taking differently: Take 500 mg by mouth 2 (Two) Times a Day.) 180 tablet 1   • Magnesium Oxide 400 MG capsule Take 400 mg by mouth Every Night. 90 each 1   • metaxalone (SKELAXIN) 800 MG tablet Take 800 mg by mouth 3 (Three) Times a Day.     • metoclopramide (REGLAN) 10 MG tablet Take 1 tablet by mouth 4 (Four) Times a Day Before Meals & at Bedtime. 120 tablet 1   • miconazole (MICOTIN) 2 % powder Apply  topically to the appropriate area as directed As Needed for Itching. 71 g 0   • miconazole (MICOTIN) 2 % vaginal cream Insert 1 applicator into the vagina Every Night. 45 g 0   • mirtazapine (REMERON) 15 MG tablet Take 15 mg by mouth Every Night.     • Multiple Vitamins-Minerals (MULTIVITAMIN WITH MINERALS) tablet tablet Take 1 tablet by mouth Daily.     • Narcan 4 MG/0.1ML nasal spray 1 spray into the nostril(s) as directed by provider As Needed.     • NovoLOG FlexPen 100 UNIT/ML solution pen-injector sc pen INJECT 60 UNITS UNDER THE SKIN 3 TIME DAILY 60 mL 2   • Oxaydo 7.5 MG tablet Take 7.5 mg by mouth 2 (Two) Times a Day.     • polyethylene glycol (MIRALAX) 17 GM/SCOOP powder Take 17 g by mouth Daily As Needed.     • pregabalin (LYRICA) 75 MG capsule Take 75 mg by mouth 2 (Two) Times a Day.     • promethazine (PHENERGAN) 25 MG tablet Take 25 mg by mouth Every 8 (Eight) Hours As Needed for Nausea or Vomiting.     • Scopolamine 1  MG/3DAYS patch Place 1 patch on the skin as directed by provider.     • sertraline (ZOLOFT) 50 MG tablet Take 50 mg by mouth Daily.     • spironolactone (ALDACTONE) 100 MG tablet TAKE ONE TABLET BY MOUTH DAILY (Patient taking differently: Take 100 mg by mouth Daily As Needed (swelling). For swelling) 30 tablet 2   • vitamin B-12 (CYANOCOBALAMIN) 1000 MCG tablet Take 1,000 mcg by mouth Daily.     • Xifaxan 550 MG tablet TAKE ONE TABLET BY MOUTH TWICE A DAY 60 tablet 1   • zinc gluconate 50 MG tablet Take 50 mg by mouth Daily.     • zolpidem (AMBIEN) 5 MG tablet Take 5 mg by mouth Every Night.       No current facility-administered medications for this visit.       Current outpatient and discharge medications have been reconciled for the patient.  Reviewed by: JOEL Sher       [facility]:     The following portions of the patient's history were reviewed and updated as appropriate: Allergies / Current Medications / Past Medical History / Surgical History / Social History / Family History    PROBLEM LIST   Patient Active Problem List   Diagnosis   • Cirrhosis of liver (HCC)   • Arthritis   • Anxiety and depression   • Type 2 diabetes mellitus (HCC)   • Fibrositis   • Change in blood platelet count   • Pancytopenia (HCC)   • Hypersplenism   • Nausea & vomiting   • DUKES (nonalcoholic steatohepatitis)   • Hyperlipidemia   • Abdominal pain   • Hematemesis   • Ascites   • Portal hypertension (HCC)   • Systemic lupus erythematosus (HCC)   • Secondary esophageal varices without bleeding (HCC)   • Esophageal varices without bleeding (HCC)   • Gastroparesis   • 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 (HCC)   • Diabetic ketoacidosis without coma associated with type 2 diabetes mellitus (HCC)   • Diabetic peripheral neuropathy (HCC)   • History of seizure disorder   • Hepatic encephalopathy (HCC)   • Thrombocytopenia (HCC)   • Fever   • Immune thrombocytopenic purpura (HCC)   • Degeneration of lumbosacral intervertebral disc   •  Spondylosis without myelopathy   • UGI bleed   • Migraine headache   • Retention of urine   • Uncontrolled diabetes mellitus with hyperglycemia (HCC)   • Obstructive sleep apnea syndrome   • Gastroparesis   • Hyperammonemia (HCC)   • Hyponatremia   • Anemia   • Vitamin D deficiency   • Hyperglycemia   • Iron deficiency anemia   • Adverse effect of iron and its compounds, sequela   • Hemorrhage of anus and rectum   • Lesion of vulva   • Hematemesis   • Acute blood loss anemia   • Acute hyperkalemia   • Labial cyst   • Transaminitis   • Hyperbilirubinemia   • Acute gastritis   • Bacterial urinary infection   • UTI (urinary tract infection)   • Cholestatic pruritus   • Fatty liver disease, nonalcoholic   • Protein-calorie malnutrition, moderate (HCC)   • Hyperglycemia due to type 1 diabetes mellitus (HCC)   • Ascites   • Hospital discharge follow-up   • Near syncope   • Abnormal TSH   • Abdominal pain, epigastric   • Bloating   • Fibromyalgia       PAST MEDICAL HISTORY  Past Medical History:   Diagnosis Date   • Anemia    • Anxiety    • Arthritis    • Broken shoulder     left shoulder    • Chromosomal abnormality     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   • Cirrhosis (HCC)    • Clostridium difficile infection    • Colon polyps    • Depression    • Diabetes mellitus (HCC)     Adult onset, insulin requiring   • Duodenal ulcer with hemorrhage    • Esophageal varices determined by endoscopy (HCC)    • Fibromyalgia    • Gallbladder disease    • Gastric varices    • Gastroparesis    • Glaucoma    • Granuloma annulare    • H/O B12 deficiency    • H/O Bleeding ulcer     And gastroesophageal varices   • H/O mixed connective tissue disorder    • Hemorrhoids    • Hiatal hernia    • History of transfusion     needs bendryl  flushes sensation and temp goes up   • Hx of being hospitalized     In [place] for GI bleeding from ulcer and varices   •  Hyperlipidemia    • Migraine    • Mitral valve prolapse    • DUKES (nonalcoholic steatohepatitis) 11/2016   • Orthostatic hypotension 02/2019   • BRICE (obstructive sleep apnea)    • Pancreas divisum    • Pancytopenia (HCC)     Chronic, due to cirrhosis and hypersplenism   • Peptic ulcer disease     And esophageal varices   • PONV (postoperative nausea and vomiting)    • RA (rheumatoid arthritis) (HCC)    • Seizure disorder (HCC)     last 2003   • Seizures (HCC)    • Systemic lupus (HCC)    • TIA (transient ischemic attack) 1984       SURGICAL HISTORY  Past Surgical History:   Procedure Laterality Date   • BARTHOLIN CYST MARSUPIALIZATION Left 1/18/2021    Procedure: EXCISION OF LABIAL CYST;  Surgeon: Melinda Thakkar MD;  Location: [facility] MAIN OR;  Service: Obstetrics/Gynecology;  Laterality: Left;   • BLADDER REPAIR  1991   • CATARACT EXTRACTION     • CHOLECYSTECTOMY  1994   • COLONOSCOPY     • ENDOSCOPY N/A 11/7/2016    Procedure: ESOPHAGOGASTRODUODENOSCOPY WITH COLD POLYPECTOMY, BANDING,  AND CLIPS TIMES 2;  Surgeon: Rich Coleman MD;  Location: [facility] ENDOSCOPY;  Service:    • ENDOSCOPY N/A 12/22/2016    Procedure: ESOPHAGOGASTRODUODENOSCOPY WITH ESOPHAGEAL BANDING. 5 BANDS FIRED, 4 BANDS ADHERERD TO MUCOSA;  Surgeon: Rich Coleman MD;  Location: [facility] ENDOSCOPY;  Service:    • ENDOSCOPY N/A 2/15/2017    Procedure: ESOPHAGOGASTRODUODENOSCOPY AT BEDSIDE with esophageal varices banding (3);  Surgeon: Rich Coleman MD;  Location: [facility] ENDOSCOPY;  Service:    • ENDOSCOPY N/A 4/6/2017    Procedure: ESOPHAGOGASTRODUODENOSCOPY WITH ESOPHAGEAL VARICES BANDING x2;  Surgeon: Rich Coleman MD;  Location: [facility] ENDOSCOPY;  Service:    • ENDOSCOPY N/A 11/24/2017    Procedure: ESOPHAGOGASTRODUODENOSCOPY with biopsies;  Surgeon: Rich Coleman MD;  Location: [facility] ENDOSCOPY;  Service:    • ENDOSCOPY N/A 10/5/2018    Procedure: ESOPHAGOGASTRODUODENOSCOPY;  Surgeon: Rich Coleman MD;  Location: [facility]  "ENDOSCOPY;  Service: Gastroenterology   • ENDOSCOPY N/A 5/10/2019    Procedure: ESOPHAGOGASTRODUODENOSCOPY AT BEDSIDE WITH VARICEAL LIGATION;  Surgeon: Rich Coleman MD;  Location: [facility] ENDOSCOPY;  Service: Gastroenterology   • ENDOSCOPY N/A 2019    Procedure: ESOPHAGOGASTRODUODENOSCOPY WITH ESOPHAGEAL BANDING (3 BANDS);  Surgeon: Rich Coleman MD;  Location: [facility] ENDOSCOPY;  Service: Gastroenterology   • ENDOSCOPY N/A 4/3/2021    Procedure: ESOPHAGOGASTRODUODENOSCOPY WITH COLD BIOPSIES & BRUSHINGS;  Surgeon: Endy Vasquez MD;  Location: [facility] ENDOSCOPY;  Service: Gastroenterology;  Laterality: N/A;  PRE- N,V & DYSPHAGIA  POST- GASTRITIS, POSSIBLE FUNGAL ESOPHAGITIS   • ENDOSCOPY AND COLONOSCOPY      Dr. Rich Coleman with findings of candida esophagitis   • EYE SURGERY     • HYSTERECTOMY      partial   • JOINT REPLACEMENT     • KNEE SURGERY Right     \"right knee recontstruction\"   • LIVER BIOPSY  2015   • MASS EXCISION     • STOMACH SURGERY     • TIPS PROCEDURE  2020    x2       SOCIAL HISTORY  Social History     Socioeconomic History   • Marital status:      Spouse name: Jose   Tobacco Use   • Smoking status: Former Smoker     Packs/day: 1.00     Years: 4.00     Pack years: 4.00     Types: Cigarettes     Quit date: 2015     Years since quittin.3   • Smokeless tobacco: Never Used   Vaping Use   • Vaping Use: Never used   Substance and Sexual Activity   • Alcohol use: No   • Drug use: Never   • Sexual activity: Defer       FAMILY HISTORY  Family History   Problem Relation Age of Onset   • Liver disease Other    • Depression Other    • Heart disease Other    • Hypertension Other    • Diabetes Other    • Breast cancer Other    • Brain cancer Other    • Uterine cancer Other    • Colon cancer Other    • Leukemia Other    • Colon cancer Maternal Aunt    • Hypertension Mother    • Diabetes type II Mother    • Rheum arthritis Mother    • Liver disease Mother         DUKES "   • Heart disease Father    • Diabetes type II Father    • Diabetes type II Sister    • Lupus Sister    • Malig Hyperthermia Neg Hx        Examiner was wearing KN95 mask, face shield and exam gloves during the entire duration of the visit. Patient was masked the entire time.   Minimum social distance of 6 ft maintained entire visit except if physical contact was necessary as documented.     **Dragon Disclaimer:   Much of this encounter note is an electronic transcription/translation of spoken language to printed text. The electronic translation of spoken language may permit erroneous, or at times, nonsensical words or phrases to be inadvertently transcribed. Although I have reviewed the note for such errors, some may still exist.

## 2022-05-09 ENCOUNTER — TELEPHONE (OUTPATIENT)
Dept: ONCOLOGY | Facility: CLINIC | Age: 62
End: 2022-05-09

## 2022-05-09 ENCOUNTER — APPOINTMENT (OUTPATIENT)
Dept: LAB | Facility: HOSPITAL | Age: 62
End: 2022-05-09

## 2022-05-09 NOTE — TELEPHONE ENCOUNTER
[employer]     Left voicemail for patient to call back and leave correct insurance information for the HUB to enter. Please sne edi in basket message if patient contacts you.     Thank you!

## 2022-05-11 RX ORDER — LANOLIN ALCOHOL/MO/W.PET/CERES
CREAM (GRAM) TOPICAL
Qty: 90 TABLET | Refills: 2 | Status: SHIPPED | OUTPATIENT
Start: 2022-05-11

## 2022-05-16 ENCOUNTER — LAB (OUTPATIENT)
Dept: LAB | Facility: HOSPITAL | Age: 62
End: 2022-05-16

## 2022-05-16 ENCOUNTER — OFFICE VISIT (OUTPATIENT)
Dept: ONCOLOGY | Facility: CLINIC | Age: 62
End: 2022-05-16

## 2022-05-16 VITALS
RESPIRATION RATE: 16 BRPM | SYSTOLIC BLOOD PRESSURE: 132 MMHG | HEART RATE: 96 BPM | WEIGHT: 168.4 LBS | HEIGHT: 67 IN | OXYGEN SATURATION: 97 % | TEMPERATURE: 97.3 F | DIASTOLIC BLOOD PRESSURE: 78 MMHG | BODY MASS INDEX: 26.43 KG/M2

## 2022-05-16 DIAGNOSIS — D73.1 HYPERSPLENISM: ICD-10-CM

## 2022-05-16 DIAGNOSIS — K75.81 LIVER CIRRHOSIS SECONDARY TO NASH: ICD-10-CM

## 2022-05-16 DIAGNOSIS — M32.9 SYSTEMIC LUPUS ERYTHEMATOSUS, UNSPECIFIED SLE TYPE, UNSPECIFIED ORGAN INVOLVEMENT STATUS: ICD-10-CM

## 2022-05-16 DIAGNOSIS — K74.60 LIVER CIRRHOSIS SECONDARY TO NASH: ICD-10-CM

## 2022-05-16 DIAGNOSIS — D50.9 IRON DEFICIENCY ANEMIA, UNSPECIFIED IRON DEFICIENCY ANEMIA TYPE: ICD-10-CM

## 2022-05-16 DIAGNOSIS — D69.3 ACUTE ITP: ICD-10-CM

## 2022-05-16 DIAGNOSIS — K74.69 OTHER CIRRHOSIS OF LIVER: Primary | ICD-10-CM

## 2022-05-16 DIAGNOSIS — K75.81 NASH (NONALCOHOLIC STEATOHEPATITIS): ICD-10-CM

## 2022-05-16 DIAGNOSIS — D69.6 THROMBOCYTOPENIA: ICD-10-CM

## 2022-05-16 DIAGNOSIS — D61.818 PANCYTOPENIA: ICD-10-CM

## 2022-05-16 DIAGNOSIS — D69.3 IMMUNE THROMBOCYTOPENIC PURPURA: ICD-10-CM

## 2022-05-16 DIAGNOSIS — I85.11 SECONDARY ESOPHAGEAL VARICES WITH BLEEDING: ICD-10-CM

## 2022-05-16 DIAGNOSIS — T45.4X5S ADVERSE EFFECT OF IRON AND ITS COMPOUNDS, SEQUELA: ICD-10-CM

## 2022-05-16 DIAGNOSIS — K76.6 PORTAL HYPERTENSION: ICD-10-CM

## 2022-05-16 LAB
BASOPHILS # BLD AUTO: 0.03 10*3/MM3 (ref 0–0.2)
BASOPHILS NFR BLD AUTO: 0.7 % (ref 0–1.5)
DEPRECATED RDW RBC AUTO: 47.8 FL (ref 37–54)
EOSINOPHIL # BLD AUTO: 0.12 10*3/MM3 (ref 0–0.4)
EOSINOPHIL NFR BLD AUTO: 2.9 % (ref 0.3–6.2)
ERYTHROCYTE [DISTWIDTH] IN BLOOD BY AUTOMATED COUNT: 15.6 % (ref 12.3–15.4)
FERRITIN SERPL-MCNC: 121.2 NG/ML (ref 11–207)
HCT VFR BLD AUTO: 37.1 % (ref 34–46.6)
HGB BLD-MCNC: 13.1 G/DL (ref 12–15.9)
IMM GRANULOCYTES # BLD AUTO: 0.03 10*3/MM3 (ref 0–0.05)
IMM GRANULOCYTES NFR BLD AUTO: 0.7 % (ref 0–0.5)
IRON 24H UR-MRATE: 113 MCG/DL (ref 37–145)
IRON SATN MFR SERPL: 25 % (ref 14–48)
LYMPHOCYTES # BLD AUTO: 0.87 10*3/MM3 (ref 0.7–3.1)
LYMPHOCYTES NFR BLD AUTO: 21.4 % (ref 19.6–45.3)
MCH RBC QN AUTO: 30.3 PG (ref 26.6–33)
MCHC RBC AUTO-ENTMCNC: 35.3 G/DL (ref 31.5–35.7)
MCV RBC AUTO: 85.9 FL (ref 79–97)
MONOCYTES # BLD AUTO: 0.44 10*3/MM3 (ref 0.1–0.9)
MONOCYTES NFR BLD AUTO: 10.8 % (ref 5–12)
NEUTROPHILS NFR BLD AUTO: 2.58 10*3/MM3 (ref 1.7–7)
NEUTROPHILS NFR BLD AUTO: 63.5 % (ref 42.7–76)
NRBC BLD AUTO-RTO: 0 /100 WBC (ref 0–0.2)
PLATELET # BLD AUTO: 121 10*3/MM3 (ref 140–450)
PLATELETS.RETICULATED NFR BLD AUTO: 2 % (ref 0.9–6.5)
PMV BLD AUTO: 9.8 FL (ref 6–12)
RBC # BLD AUTO: 4.32 10*6/MM3 (ref 3.77–5.28)
TIBC SERPL-MCNC: 449 MCG/DL (ref 249–505)
TRANSFERRIN SERPL-MCNC: 321 MG/DL (ref 200–360)
VIT B12 BLD-MCNC: >2000 PG/ML (ref 211–946)
WBC NRBC COR # BLD: 4.07 10*3/MM3 (ref 3.4–10.8)

## 2022-05-16 PROCEDURE — 83540 ASSAY OF IRON: CPT

## 2022-05-16 PROCEDURE — 36415 COLL VENOUS BLD VENIPUNCTURE: CPT

## 2022-05-16 PROCEDURE — 85025 COMPLETE CBC W/AUTO DIFF WBC: CPT

## 2022-05-16 PROCEDURE — 82607 VITAMIN B-12: CPT | Performed by: INTERNAL MEDICINE

## 2022-05-16 PROCEDURE — 99213 OFFICE O/P EST LOW 20 MIN: CPT | Performed by: INTERNAL MEDICINE

## 2022-05-16 PROCEDURE — 84466 ASSAY OF TRANSFERRIN: CPT

## 2022-05-16 PROCEDURE — 85055 RETICULATED PLATELET ASSAY: CPT

## 2022-05-16 PROCEDURE — 82728 ASSAY OF FERRITIN: CPT

## 2022-05-16 NOTE — PROGRESS NOTES
REASON FOR FOLLOWUP:    1. 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   2. Evidence of B12 deficiency on her initial lab evaluation in April 2013.  Patient was started on parenteral B12 replacement.  The patient was switched from shots to daily oral B12 replacement after the visit of 12/02/2013.    3. Hemoglobin has significantly improved since banding of her esophageal varices and increase in her oral iron supplement to twice daily dosing.  4. Morphologically normal bone marrow from 08/20/2013 with normal flow cytometry.  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  Significance of this is uncertain.    5. Mixed connective tissue disorder, currently on Enbrel therapy.  6. Insulin-requiring diabetes mellitus.  7. Patient continues to follow-up periodically with the transplant team at [facility] but currently she is not on the transplant list.  8. 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 Rituxan therapy weekly ×4 completed 7/16/2018     HISTORY OF PRESENT ILLNESS:  Silvia is a 61 y.o. 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  She has had GI bleeding in the past due to portal hypertension.      She also developed acute ITP and received Rituxan weekly ×4.  She completed her Rituxan treatment on 7/16/2018.  She had a good response.  Her platelet count did not correct to normal but did return to her baseline platelet count of around 60,000-90,000.    She also had received 2 doses of IV iron therapy in the form of Injectafer 750 mg each dose delivered on 1/29/2019 and 2/13/2019.      Silvia again required hospitalization at [facility] from 5/28/2019 to 5/30/2019 due to symptomatic edema requiring diuretic therapy.  She also had been hospitalized at [facility] from 5/9/2019 to 5/14/2019 and on that admission she  "underwent an esophageal varices banding procedure on 5/10/2019.    She received additional IV Injectafer on 6/26/2019 and 7/3/2019.    She was in [place] taking care of her dying father for several months.  Her father has since passed away and she returned to [place] In December 2020.    While in [place] she also was very ill due to her cirrhosis and portal hypertension.  She had bleeding esophageal varices on several occasions requiring admission and transfusions.  She ended up undergoing a TI PS procedure.    She is following up with Dr. Montes at [facility] regarding potential liver transplant.  She apparently does not qualify for liver transplant due to her brittle diabetes and gastroparesis.      Past Medical History, Past Surgical History, Social History, Family History have been reviewed and are without significant changes except as mentioned.      Review of Systems   Constitutional: Positive for fatigue and fever. Negative for activity change.   HENT: Negative for mouth sores, nosebleeds and trouble swallowing.    Respiratory: Negative for cough, shortness of breath and wheezing.    Cardiovascular: Negative for chest pain and palpitations.   Gastrointestinal: Positive for constipation, nausea and vomiting. Negative for diarrhea.   Genitourinary: Negative for dysuria and hematuria.   Musculoskeletal: Negative for arthralgias and myalgias.        Muscle cramps   Skin: Negative for rash and wound.   Neurological: Negative for seizures and syncope.   Hematological: Negative for adenopathy. Bruises/bleeds easily.   Psychiatric/Behavioral: Positive for dysphoric mood. Negative for confusion.       Medications:  The current medication list was reviewed in the EMR    ALLERGIES:    Allergies   Allergen Reactions   • Albuterol Anaphylaxis   • Imitrex [Sumatriptan] Anaphylaxis   • Tramadol Nausea Only and GI Intolerance     Per pt causes her \"palsy, meaning shaking and tremors\"    • Coconut (Cocos Nucifera) Allergy Skin Test " "Nausea And Vomiting   • Nsaids Unknown - Low Severity     Told by MD not to take due to liver problems   • Tylenol [Acetaminophen] Unknown - Low Severity     Has liver problems and told by MD to not take   • Zofran [Ondansetron Hcl] Unknown - Low Severity     Pt states does not work to correct nausea and vomiting.    • Codeine Nausea Only, Rash and GI Intolerance   • Lactulose Nausea And Vomiting     Severe abdominal pain   • Quinine Derivatives Nausea And Vomiting              Vitals:    05/16/22 0901   BP: 132/78   Pulse: 96   Resp: 16   Temp: 97.3 °F (36.3 °C)   TempSrc: Temporal   SpO2: 97%   Weight: 76.4 kg (168 lb 6.4 oz)   Height: 170.2 cm (67.01\")   PainSc: 7  Comment: joints/both sides/lt shoulder   PainLoc: Back          Physical Exam    CONSTITUTIONAL:  Vital signs reviewed.  No distress, looks comfortable.  EYES:  Conjunctiva and lids unremarkable.  PERRLA  EARS,NOSE,MOUTH,THROAT:  Ears and nose appear unremarkable.  Lips, teeth, gums appear unremarkable.  RESPIRATORY:  Normal respiratory effort.  Lungs clear to auscultation bilaterally.  CARDIOVASCULAR:  Normal S1, S2.  No murmurs rubs or gallops.  No significant lower extremity edema.  GASTROINTESTINAL: Abdomen appears distended with positive fluid wave   MUSCULOSKELETAL:  Unremarkable digits/nails.  No cyanosis or clubbing.  SKIN:  Warm.  No rashes.  PSYCHIATRIC: She seems depressed.  She is on Xifaxan for hepatic encephalopathy.    I have reexamined the patient and the results are consistent with the previously documented exam. Sukhdeep Mcdowell MD       RECENT LABS:  WBC   Date Value Ref Range Status   05/16/2022 4.07 3.40 - 10.80 10*3/mm3 Final     Hemoglobin   Date Value Ref Range Status   05/16/2022 13.1 12.0 - 15.9 g/dL Final     Platelets   Date Value Ref Range Status   05/16/2022 121 (L) 140 - 450 10*3/mm3 Final     Lab Results   Component Value Date    NEUTROABS 2.58 05/16/2022     Lab Results   Component Value Date    IRON 76 01/10/2022    " TIBC 413 01/10/2022    FERRITIN 80.30 01/10/2022   SAT 23%    Lab Results   Component Value Date    GLUCOSE 202 (H) 02/10/2022    BUN 17 02/10/2022    CREATININE 0.84 02/10/2022    EGFRIFNONA 69 02/10/2022    EGFRIFAFRI 64 12/20/2021    BCR 20.2 02/10/2022    K 4.1 02/10/2022    CO2 21.6 (L) 02/10/2022    CALCIUM 8.6 02/10/2022    PROTENTOTREF 5.7 (L) 05/03/2021    ALBUMIN 3.90 02/08/2022    LABIL2 1.2 05/03/2021    AST 35 (H) 02/08/2022    ALT 37 (H) 02/08/2022             CT ABDOMEN PELVIS W CONTRAST- 4/29/2021  IMPRESSION:   1.  Cirrhotic liver morphology with sequela of portal hypertension  including portal venous dilatation and splenomegaly. Mild eccentric  hypoattenuation within the superior aspect of a TIPS is unchanged and  may represent nonocclusive thrombus.  2.  Distal esophageal and gastric wall thickening have slightly  improved, although, the stomach is poorly distended and not well  evaluated. Distal esophageal wall thickening can be seen with reflux  esophagitis. Consider follow-up upper endoscopy to exclude an underlying  lesion.  3.  No bowel obstruction or CT evidence of acute appendicitis.      ASSESSMENT/PLAN:     *ITP.    Platelets are currently doing well with a platelet count today of 121,000.      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     *B12 deficiency.  On oral B12 since December 2013.  Last B12 level greater than 2000.  Repeat B12 level pending     *History of esophageal varices banding.  She since has undergone a TI PS procedure     *Iron deficiency anemia due to GI bleeding.  Continuing oral iron supplementation twice daily.  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  This is of unknown significance.     *Rheumatoid arthritis.       *Lupus     *Diabetes.           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      *Gastroparesis.  Improved with Reglan.  She is worried about developing tardive dyskinesia.        Plan  1.  We have asked Silvia to continue her oral iron and B12 supplementation for now.    2.  She will be scheduled for MD follow-up appointment with labs including a CBC, iron panel, ferritin, serum chemistries and B12 level in 12 weeks.   3.  She was instructed to call us if she has any new problems particularly with bleeding or bruising and we can always check her blood counts sooner if needed.

## 2022-07-11 ENCOUNTER — TELEPHONE (OUTPATIENT)
Dept: INTERNAL MEDICINE | Age: 62
End: 2022-07-11
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
## 2022-07-28 RX ORDER — LEVETIRACETAM 500 MG/1
TABLET ORAL
Qty: 180 TABLET | Refills: 1 | Status: SHIPPED | OUTPATIENT
Start: 2022-07-28

## 2022-08-08 ENCOUNTER — TELEPHONE (OUTPATIENT)
Dept: ONCOLOGY | Facility: CLINIC | Age: 62
End: 2022-08-08

## 2022-08-08 NOTE — TELEPHONE ENCOUNTER
Caller: Silvia Zabala    Relationship to patient: Self    Best call back number: [phone]    Chief complaint: PATIENT TO CANCEL 8/15/22 APPT AND TO RESCHEDULE AT A LATER TIME,

## 2022-08-15 ENCOUNTER — APPOINTMENT (OUTPATIENT)
Dept: LAB | Facility: HOSPITAL | Age: 62
End: 2022-08-15

## 2022-08-19 ENCOUNTER — TELEPHONE (OUTPATIENT)
Dept: ENDOCRINOLOGY | Age: 62
End: 2022-08-19

## 2022-12-27 ENCOUNTER — TELEPHONE (OUTPATIENT)
Dept: ENDOCRINOLOGY | Age: 62
End: 2022-12-27

## 2023-02-23 NOTE — PROGRESS NOTES
Case Management Discharge Note    Final Note: Pt was dc'd home    Destination      No service has been selected for the patient.      Durable Medical Equipment      No service has been selected for the patient.      Dialysis/Infusion      No service has been selected for the patient.      Home Medical Care      No service has been selected for the patient.      Therapy      No service has been selected for the patient.      Community Resources      No service has been selected for the patient.        Transportation Services  Private: Car    Final Discharge Disposition Code: 01 - home or self-care   Mr. Simpson is a 33 year old male with a history of sleep apnea and Crohn's colitis previously on Humira 40mg/0.8 ml q 2 weeks presenting for follow up. He was seen in the office in November for Crohn's colitis, clinically asymptomatic off treatment following self discontinuation of Humira.  He expressed an interest in resuming biologic therapy as discussed at the previous visit.  He was to proceed with labs as previously recommended with consideration for initiation of Stelara versus Entyvio sq. Labs 12/20/2022:H/H14.9/42.4, MCV 84, , WBC 5.5.  AST 52, ALT 42, ALP 46, T. bili 0.7, CMP otherwise unremarkable.  Normal vitamin D, CRP, ESR.  Negative QuantiFERON-TB gold.  Reactive hepatitis A antibody total.  Nonreactive hepatitis A antibody IgM, hepatitis B surface antigen and antibody, hepatitis B core antibody IgM, hepatitis C antibody. He continues to do well from a GI standpoint.  He is having 2 bowel movements per day.  He has occasional small-volume red blood per rectum with wiping which he attributes to hemorrhoids.  This responds to Preparation H when needed.  He has infrequent abdominal cramping and diarrhea, which is typically diet associated.  His Stelara has been approved but he is awaiting additional financial information from his insurance company prior to scheduling the induction infusion.

## 2023-06-16 NOTE — PROGRESS NOTES
[facility]  Inpatient Progress Note    Reason for Follow Up: Vomiting, DUKES cirrhosis, hepatic encephalopathy    Subjective     Interval History:   Vomiting has stopped.  She is now eating.  Complaining of burning in her esophagus and feeling food stick but apparently not here.    Current Facility-Administered Medications:   •  ALPRAZolam (XANAX) tablet 0.5 mg, 0.5 mg, Oral, BID PRN, Mina Gonzalez MD  •  dextrose (D50W) 25 g/ 50mL Intravenous Solution 25 g, 25 g, Intravenous, Q15 Min PRN, Karlene Fernandez APRN  •  dextrose (GLUTOSE) oral gel 15 g, 15 g, Oral, Q15 Min PRN, Karlene Fernandez APRN  •  DULoxetine (CYMBALTA) DR capsule 30 mg, 30 mg, Oral, Daily, Sukhdeep Rodarte III, MD, 30 mg at 02/05/21 1604  •  furosemide (LASIX) tablet 80 mg, 80 mg, Oral, Daily PRN, Mina Gonzalez MD  •  glucagon (human recombinant) (GLUCAGEN DIAGNOSTIC) injection 1 mg, 1 mg, Subcutaneous, PRN, Karlene Fernandez, APRN  •  Glycerin-Hypromellose- (ARTIFICIAL TEARS) 0.2-0.2-1 % ophthalmic solution solution 1 drop, 1 drop, Left Eye, Q1H PRN, Rivera Cunningham MD, 1 drop at 02/04/21 1551  •  hydrOXYzine (ATARAX) tablet 50 mg, 50 mg, Oral, Nightly, Mina Gonzalez MD, 50 mg at 02/04/21 2036  •  insulin glargine (LANTUS, SEMGLEE) injection 80 Units, 80 Units, Subcutaneous, Nightly, Mina Gonzalez MD, 80 Units at 02/04/21 2151  •  insulin lispro (humaLOG, ADMELOG) injection 0-14 Units, 0-14 Units, Subcutaneous, TID AC, Karlene Fernandez APRN, 14 Units at 02/05/21 1729  •  levETIRAcetam (KEPPRA) tablet 500 mg, 500 mg, Oral, BID, Mina Gonzalez MD, 500 mg at 02/05/21 0809  •  metoclopramide (REGLAN) tablet 5 mg, 5 mg, Oral, 4x Daily AC & at Bedtime, Mina Gonzalez MD, 5 mg at 02/05/21 1730  •  mirtazapine (REMERON) tablet 15 mg, 15 mg, Oral, Nightly, Mina Gonzalez MD, 15 mg at 02/04/21 2151  •  oxyCODONE (ROXICODONE) immediate release tablet 5 mg, 5 mg, Oral, Q6H PRN, Octavio,  Rivera Hardy MD, 5 mg at 02/05/21 1229  •  pantoprazole (PROTONIX) injection 40 mg, 40 mg, Intravenous, Q AM, Mina Gonzalez MD, 40 mg at 02/05/21 0614  •  pregabalin (LYRICA) capsule 75 mg, 75 mg, Oral, Q12H, Mina Gonzalez MD, 75 mg at 02/05/21 0809  •  promethazine (PHENERGAN) tablet 12.5 mg, 12.5 mg, Oral, Q6H PRN, Mina Gonzalez MD, 12.5 mg at 02/04/21 2356  •  riFAXIMin (XIFAXAN) tablet 550 mg, 550 mg, Oral, BID, Mina Gonzalez MD, 550 mg at 02/05/21 0809  •  sodium chloride 0.9 % flush 10 mL, 10 mL, Intravenous, PRN, Emergency, Triage Protocol, MD  •  sodium chloride 0.9 % flush 10 mL, 10 mL, Intravenous, Q12H, Mina Gonzalez MD, 10 mL at 02/05/21 0809  •  sodium chloride 0.9 % flush 10 mL, 10 mL, Intravenous, PRN, Mina Gonzalez MD  •  spironolactone (ALDACTONE) tablet 100 mg, 100 mg, Oral, Daily, Mina Gonzalez MD, 100 mg at 02/05/21 0809  •  sucralfate (CARAFATE) tablet 1 g, 1 g, Oral, 4x Daily AC & at Bedtime, Mina Gonzalez MD, 1 g at 02/05/21 1730  Review of Systems:   All systems reviewed and negative except for: GI: Burning pain, intermittent episodes of dysphagia; musculoskeletal: Back pain    Objective     Vital Signs  Temp:  [97.9 °F (36.6 °C)-99.2 °F (37.3 °C)] 98 °F (36.7 °C)  Heart Rate:  [83-96] 96  Resp:  [16-18] 18  BP: (125-150)/(64-82) 138/67  Body mass index is 27.42 kg/m².  No intake or output data in the 24 hours ending 02/05/21 1906  No intake/output data recorded.     Physical Exam:   General: patient awake, alert and cooperative, missing teeth   Eyes: Normal lids and lashes, no scleral icterus   Neck: supple, normal ROM   Skin: warm and dry, not jaundiced   Cardiovascular: regular rhythm and rate, no murmurs auscultated   Pulm: clear to auscultation bilaterally, regular and unlabored   Abdomen: soft, obese, nontender, nondistended; normal bowel sounds   Rectal: deferred   Extremities: no rash or edema   Psychiatric: Normal mood and behavior; memory  intact     Results Review:     I reviewed the patient's new clinical results.    Results from last 7 days   Lab Units 02/05/21  0532 02/04/21  0557 02/03/21  1139   WBC 10*3/mm3 4.20 4.10 6.13   HEMOGLOBIN g/dL 12.6 11.1* 13.2   HEMATOCRIT % 35.7 30.9* 37.4   PLATELETS 10*3/mm3 91* 79* 87*     Results from last 7 days   Lab Units 02/05/21  0532 02/04/21  0557 02/03/21  1139   SODIUM mmol/L 136 136 131*   POTASSIUM mmol/L 3.7 3.9 3.7   CHLORIDE mmol/L 104 102 96*   CO2 mmol/L 24.4 24.2 22.7   BUN mg/dL 15 16 21*   CREATININE mg/dL 0.85 0.64 0.85   CALCIUM mg/dL 9.2 9.4 9.7   BILIRUBIN mg/dL 1.7* 1.8* 2.2*   ALK PHOS U/L 158* 141* 186*   ALT (SGPT) U/L 43* 35* 40*   AST (SGOT) U/L 44* 38* 38*   GLUCOSE mg/dL 199* 208* 298*     Results from last 7 days   Lab Units 02/03/21  1139   INR  1.15*     Lab Results   Lab Value Date/Time    LIPASE 14 02/03/2021 1139    LIPASE 8 (L) 05/09/2019 2043    LIPASE 9 (L) 01/17/2019 1259    LIPASE 8 (L) 12/13/2018 1900    LIPASE 12 (L) 10/02/2018 2332    LIPASE 9 (L) 05/18/2018 0425    LIPASE 22 12/16/2017 1650    LIPASE 10 (L) 10/30/2017 1732    LIPASE 9 (L) 02/21/2017 1202    LIPASE 16 11/10/2014 0724       Radiology:  XR Chest AP   Final Result      CT Abdomen Pelvis With Contrast   Final Result   1. Hepatic cirrhosis with splenomegaly. No ascites. Patent TIPS stent in   place.   2. Circumferential wall thickening of the stomach that may suggest   gastritis.       These findings were discussed with Dr. Astudillo by telephone at the time of   dictation.       Radiation dose reduction techniques were utilized, including automated   exposure control and exposure modulation based on body size.       This report was finalized on 2/4/2021 10:12 AM by Dr. Rose Lin M.D.          XR Chest 1 View   Final Result   No acute process.       This report was finalized on 2/3/2021 12:27 PM by Dr. Hema Sampson M.D.              Assessment/Plan     Patient Active Problem List   Diagnosis   •  Cirrhosis of liver (CMS/HCC)   • Rheumatoid arthritis (CMS/HCC)   • Anxiety and depression   • Type 2 diabetes mellitus, uncontrolled, with neuropathy (CMS/HCC)   • Fibrositis   • Change in blood platelet count   • Pancytopenia (CMS/HCC)   • Hypersplenism   • Nausea & vomiting   • DUKES (nonalcoholic steatohepatitis)   • Hyperlipidemia   • Abdominal pain   • Hematemesis   • Ascites   • Portal hypertension (CMS/HCC)   • Systemic lupus (CMS/HCC)   • Secondary esophageal varices without bleeding (CMS/HCC)   • Esophageal varices with bleeding (CMS/HCC)   • Gastroparesis   • Cirrhosis of liver with ascites (CMS/HCC)   • Diabetic ketoacidosis without coma associated with type 2 diabetes mellitus (CMS/HCC)   • Diabetic peripheral neuropathy (CMS/HCC)   • History of seizure disorder   • Hepatic encephalopathy (CMS/HCC)   • Thrombocytopenia (CMS/HCC)   • Fever   • Acute ITP (CMS/HCC)   • Degeneration of lumbosacral intervertebral disc   • Seizure (CMS/HCC)   • Spondylosis without myelopathy   • Intractable vomiting with nausea   • UGI bleed   • Migraine without aura   • Urinary retention   • Uncontrolled diabetes mellitus with hyperglycemia (CMS/HCC)   • BRICE (obstructive sleep apnea)   • Gastroparesis   • Hyperammonemia (CMS/HCC)   • Hyponatremia   • Anemia   • Vitamin D insufficiency   • Hyperglycemia   • Dehydration   • Iron deficiency anemia   • Adverse effect of iron or its compound, subsequent encounter   • Hemorrhage of anus and rectum   • Vulvar lesion   • Acute upper GI bleed   • Acute blood loss anemia   • Acute hyperkalemia   • Labial cyst   • Transaminitis   • Hyperbilirubinemia   • Acute gastritis   • UTI (urinary tract infection), bacterial   • UTI (urinary tract infection)       Impression  1.  Vomiting: Resolved    2.  DUKES cirrhosis: Currently stable    3.  Hepatic encephalopathy    4.  History of esophageal varices: Status post TI PS, patent on 1/29/21    5.  Abnormal CT abdomen:?  Gastritis    Plan  Advance  diet as tolerated, she seems to be much improved today  Continue PPI, Carafate  She is tolerating a diet  If she otherwise improves, recommend outpatient follow-up with Dr. Gray; could consider repeating her EGD at that time    I discussed the patients findings and my recommendations with patient.    All necessary PPE, including face mask and eye protection, were worn during this encounter.  Hand sanitization was performed both before and after the patient interaction.    Over 25 minutes was spent reviewing the patient's chart and records, in discussion with the patient, in examination of the patient, and in discussion with members of the patient's medical team.    Sheila Moore MD   given to family

## 2023-08-06 NOTE — PROGRESS NOTES
[facility]     LOS: 3 days     Subjective:    Urinary retention and will check a PVR    HA, nausea, some dry heaves    Eating today and some food got stuck in throat      Objective:    Vital Signs:  Temp:  [98.1 °F (36.7 °C)-98.7 °F (37.1 °C)] 98.1 °F (36.7 °C)  Heart Rate:  [104-106] 104  Resp:  [18] 18  BP: (129-135)/(74-77) 132/75    SpO2:  [94 %-100 %] 100 %  on   ;   Device (Oxygen Therapy): room air  Body mass index is 30.75 kg/m².    Physical Exam   Constitutional: She appears well-developed and well-nourished.   HENT:   Head: Normocephalic and atraumatic.   Cardiovascular: Normal rate and regular rhythm.    No murmur heard.  Pulmonary/Chest: Effort normal and breath sounds normal.   Abdominal: Soft. Bowel sounds are normal. She exhibits no distension. There is tenderness.   Neurological: She is alert.   Skin: Skin is warm and dry.       Results Review:    Glucose   Date Value Ref Range Status   10/05/2018 141 (H) 65 - 99 mg/dL Final   10/04/2018 204 (H) 65 - 99 mg/dL Final       Results from last 7 days  Lab Units 10/06/18  0437   WBC 10*3/mm3 1.91*   HEMOGLOBIN g/dL 9.9*   HEMATOCRIT % 31.8*   PLATELETS 10*3/mm3 95*  95*       Results from last 7 days  Lab Units 10/05/18  0352   SODIUM mmol/L 138   POTASSIUM mmol/L 3.7   CHLORIDE mmol/L 106   CO2 mmol/L 18.6*   BUN mg/dL 7   CREATININE mg/dL 0.64   CALCIUM mg/dL 8.3*   BILIRUBIN mg/dL 1.2   ALK PHOS U/L 168*   ALT (SGPT) U/L 19   AST (SGOT) U/L 30   GLUCOSE mg/dL 141*       Results from last 7 days  Lab Units 10/02/18  2332   INR  1.24*   APTT seconds 30.2           Results from last 7 days  Lab Units 10/04/18  0900 10/02/18  2332   TROPONIN T ng/mL <0.010 <0.010     Cultures:       I have reviewed daily medications and changes in CPOE    Scheduled meds    buPROPion  mg Oral Daily   DULoxetine 90 mg Oral Daily   enoxaparin 40 mg Subcutaneous Q24H   folic acid 1,000 mcg Oral Daily   insulin aspart 0-10 Units Subcutaneous  History  Chief Complaint   Patient presents with   • Mouth Lesions     Arrives with mother who reports patient has spots on tongue that were first noted today. States tylenol given 30mins PTA and that patient was recently seen for fevers. Patient has been nauseous per mom. 15month-old female presents ED with oral lesions. She is accompanied by her mom. Her mom describes that she noticed a white plaque on patient's tongue today. Endorses nausea. Denies shortness of breath, chest pain, fever, chills, vomiting, abdominal pain. Normal bowel movements and urination. Denies sick contacts. Up-to-date on vaccination. Eating and drinking normally. Prior to Admission Medications   Prescriptions Last Dose Informant Patient Reported? Taking?   acetaminophen (TYLENOL) 120 mg suppository   No No   Sig: Insert 1 suppository (120 mg total) into the rectum every 6 (six) hours as needed for fever   ibuprofen (MOTRIN) 100 mg/5 mL suspension   No No   Sig: Take 2.77 mL (55.4 mg total) by mouth every 6 (six) hours as needed for mild pain   ibuprofen (MOTRIN) 100 mg/5 mL suspension   No No   Sig: Take 5.6 mL (112 mg total) by mouth every 6 (six) hours as needed for moderate pain for up to 5 days   ondansetron (ZOFRAN) 4 MG/5ML solution   No No   Sig: Take 2.5 mL (2 mg total) by mouth 3 (three) times a day as needed for nausea or vomiting for up to 7 days      Facility-Administered Medications: None       History reviewed. No pertinent past medical history. History reviewed. No pertinent surgical history. History reviewed. No pertinent family history. I have reviewed and agree with the history as documented. E-Cigarette/Vaping     E-Cigarette/Vaping Substances     Social History     Tobacco Use   • Smoking status: Never     Passive exposure: Never   • Smokeless tobacco: Never       Review of Systems   Constitutional: Negative for chills, crying, diaphoresis, fatigue and fever.    HENT: Positive for mouth 4x Daily With Meals & Nightly   insulin aspart 10 Units Subcutaneous TID With Meals   insulin detemir 10 Units Subcutaneous Nightly   insulin detemir 40 Units Subcutaneous QAM   levETIRAcetam 500 mg Oral BID   pantoprazole 40 mg Intravenous Q AM   polyethylene glycol 17 g Oral Daily   predniSONE 10 mg Oral Daily   pregabalin 75 mg Oral BID   rifaximin 550 mg Oral BID   sodium chloride 3 mL Intravenous Q12H   spironolactone 100 mg Oral Daily   sucralfate 1 g Oral BID   trimethobenzamide 300 mg Oral TID         lactated ringers 1,000 mL    sodium chloride 75 mL/hr Last Rate: 75 mL/hr (10/06/18 0818)     PRN meds  •  acetaminophen  •  ALPRAZolam  •  bisacodyl  •  cyclobenzaprine  •  dextrose  •  dextrose  •  glucagon (human recombinant)  •  HYDROmorphone  •  hydrOXYzine  •  nitroglycerin  •  ondansetron **OR** ondansetron ODT **OR** ondansetron  •  oxyCODONE  •  potassium chloride  •  promethazine  •  promethazine  •  sodium chloride  •  Insert peripheral IV **AND** sodium chloride      Principal Problem:    UGI bleed  Active Problems:    Abdominal pain    Cirrhosis of liver (CMS/HCC)    Type 2 diabetes mellitus, uncontrolled, with neuropathy (CMS/HCC)    Gastroparesis    Rheumatoid arthritis (CMS/HCC)    Anxiety and depression    Intractable vomiting with nausea        Assessment/Plan:  Duodenitis - empiric ppi gtt and change to 40 qday    Prolonged qtc- better on the monitor      Abdominal pain- is better      Cirrhosis of liver (CMS/HCC)      Type 2 diabetes mellitus, uncontrolled, with neuropathy (CMS/HCC)  -blood sugars are good and endrinology is following      Gastroparesis      Rheumatoid arthritis (CMS/HCC)      Anxiety and depression      Intractable vomiting with nausea-last vomiting in er  -spitting up today    Pancytopenia and the patient is followed by Dr. Mcdowell.  CBC group has seen  Urinary retention    dvt prophylaxis-scd, platelets low so no lovenox        Jose Almanza MD  10/06/18  3:16 PM     sores. Negative for ear pain and sore throat. Eyes: Negative for photophobia, pain, discharge, redness, itching and visual disturbance. Respiratory: Negative for wheezing and stridor. Cardiovascular: Negative for chest pain and leg swelling. Gastrointestinal: Negative for abdominal pain, blood in stool, constipation, diarrhea, nausea and vomiting. Genitourinary: Negative for frequency and hematuria. Musculoskeletal: Negative for gait problem and joint swelling. Skin: Negative for color change and rash. Neurological: Negative for tremors, seizures, syncope and weakness. All other systems reviewed and are negative. Physical Exam  Physical Exam  Vitals and nursing note reviewed. Constitutional:       General: She is active. She is not in acute distress. Appearance: Normal appearance. She is well-developed. She is not toxic-appearing. HENT:      Right Ear: Tympanic membrane normal.      Left Ear: Tympanic membrane normal.      Mouth/Throat:      Mouth: Mucous membranes are moist.      Comments: Visual inspection of patient's mouth demonstrates white plaque on tongue. Scrappable with tongue depressor. Nonbleeding. No other oral lesions seen. Tonsils nonswollen, no exudate. Eyes:      General:         Right eye: No discharge. Left eye: No discharge. Conjunctiva/sclera: Conjunctivae normal.   Cardiovascular:      Rate and Rhythm: Regular rhythm. Heart sounds: S1 normal and S2 normal. No murmur heard. Pulmonary:      Effort: Pulmonary effort is normal. No respiratory distress. Breath sounds: Normal breath sounds. No stridor. No wheezing. Abdominal:      General: Bowel sounds are normal.      Palpations: Abdomen is soft. Tenderness: There is no abdominal tenderness. Genitourinary:     Vagina: No erythema. Musculoskeletal:         General: No swelling. Normal range of motion. Cervical back: Neck supple.    Lymphadenopathy:      Cervical: No cervical adenopathy. Skin:     General: Skin is warm and dry. Capillary Refill: Capillary refill takes less than 2 seconds. Findings: No rash. Neurological:      Mental Status: She is alert. Vital Signs  ED Triage Vitals [08/05/23 2109]   Temperature Pulse Respirations Blood Pressure SpO2   (!) 96.7 °F (35.9 °C) 112 (!) 18 (!) 136/72 98 %      Temp src Heart Rate Source Patient Position - Orthostatic VS BP Location FiO2 (%)   Tympanic Monitor Sitting Right arm --      Pain Score       --           Vitals:    08/05/23 2109   BP: (!) 136/72   Pulse: 112   Patient Position - Orthostatic VS: Sitting         Visual Acuity      ED Medications  Medications - No data to display    Diagnostic Studies  Results Reviewed     None                 No orders to display              Procedures  Procedures         ED Course                                             Medical Decision Making  15month-old female presents to ED with oral lesion. She is accompanied by her mom. Patient's mom states that today she noticed a white plaque on patient's tongue. Patient also having nausea recently. On physical examination patient is afebrile, not in apparent distress. White plaque seen on patient's tongue. Plaque is scrappable without bleeding. Rest of physical examination insignificant. No rashes seen on skin. Regular breath sounds. Regular rate and rhythm. Suspect patient has oral Candida. Provided patient with dose of nystatin suspension during visit. Provided prescription for nystatin oral suspension. Patient stable for discharge. Advised that it will take a couple days for oral Candida to resolve. Encouraged oral hydration and nutrition. Prior to discharge, discharge instructions were discussed with patient at bedside. Patient was provided both verbal and written instructions. Patient is understanding of the discharge instructions and is agreeable to plan of care.  Return precautions were discussed with patient bedside, patient verbalized understanding of signs and symptoms that would necessitate return to the ED. All questions were answered. Patient was comfortable with the plan of care and discharged to home. Oral thrush: acute illness or injury  Risk  Prescription drug management. Disposition  Final diagnoses:   Oral thrush     Time reflects when diagnosis was documented in both MDM as applicable and the Disposition within this note     Time User Action Codes Description Comment    8/5/2023  9:25 PM Bertha Roberts Add [B37.0] Oral thrush       ED Disposition     ED Disposition   Discharge    Condition   Stable    Date/Time   Sat Aug 5, 2023  9:27 PM    Comment   Myles Wadean discharge to home/self care. Follow-up Information    None         Discharge Medication List as of 8/5/2023  9:28 PM      START taking these medications    Details   nystatin (MYCOSTATIN) 500,000 units/5 mL suspension Take 1 mL (100,000 Units total) by mouth 4 (four) times a day for 14 days, Starting Sat 8/5/2023, Until Sat 8/19/2023, Normal         CONTINUE these medications which have NOT CHANGED    Details   ondansetron (ZOFRAN) 4 MG/5ML solution Take 2.5 mL (2 mg total) by mouth 3 (three) times a day as needed for nausea or vomiting for up to 7 days, Starting Mon 7/24/2023, Until Mon 7/31/2023 at 2359, Normal      acetaminophen (TYLENOL) 120 mg suppository Insert 1 suppository (120 mg total) into the rectum every 6 (six) hours as needed for fever, Starting Sun 4/2/2023, Normal      ibuprofen (MOTRIN) 100 mg/5 mL suspension Take 2.77 mL (55.4 mg total) by mouth every 6 (six) hours as needed for mild pain, Starting Sun 4/2/2023, Normal             No discharge procedures on file.     PDMP Review     None          ED Provider  Electronically Signed by           Jerry Askew PA-C  08/06/23 6930

## 2024-02-23 NOTE — TELEPHONE ENCOUNTER
Patient informed of impresions   Please contact patient to schedule a follow up visit with Dr. Lee to discuss injections that Dr. [place] recommended. Next available appointment would be fine. It looks like we have some openings on 03/21 at [place].
